# Patient Record
Sex: FEMALE | Race: WHITE | NOT HISPANIC OR LATINO | Employment: OTHER | URBAN - METROPOLITAN AREA
[De-identification: names, ages, dates, MRNs, and addresses within clinical notes are randomized per-mention and may not be internally consistent; named-entity substitution may affect disease eponyms.]

---

## 2017-01-30 ENCOUNTER — ALLSCRIPTS OFFICE VISIT (OUTPATIENT)
Dept: OTHER | Facility: OTHER | Age: 72
End: 2017-01-30

## 2017-03-17 ENCOUNTER — ALLSCRIPTS OFFICE VISIT (OUTPATIENT)
Dept: OTHER | Facility: OTHER | Age: 72
End: 2017-03-17

## 2017-04-07 ENCOUNTER — ALLSCRIPTS OFFICE VISIT (OUTPATIENT)
Dept: OTHER | Facility: OTHER | Age: 72
End: 2017-04-07

## 2017-05-01 ENCOUNTER — HOSPITAL ENCOUNTER (OUTPATIENT)
Dept: RADIOLOGY | Facility: MEDICAL CENTER | Age: 72
Discharge: HOME | End: 2017-05-01
Payer: MEDICARE

## 2017-05-01 DIAGNOSIS — Z12.31 ENCOUNTER FOR SCREENING MAMMOGRAM FOR MALIGNANT NEOPLASM OF BREAST: ICD-10-CM

## 2017-05-01 PROCEDURE — G0202 SCR MAMMO BI INCL CAD: HCPCS

## 2017-06-05 ENCOUNTER — GENERIC CONVERSION - ENCOUNTER (OUTPATIENT)
Dept: OTHER | Facility: OTHER | Age: 72
End: 2017-06-05

## 2017-06-06 ENCOUNTER — ALLSCRIPTS OFFICE VISIT (OUTPATIENT)
Dept: OTHER | Facility: OTHER | Age: 72
End: 2017-06-06

## 2017-07-07 ENCOUNTER — ALLSCRIPTS OFFICE VISIT (OUTPATIENT)
Dept: OTHER | Facility: OTHER | Age: 72
End: 2017-07-07

## 2017-09-14 ENCOUNTER — ALLSCRIPTS OFFICE VISIT (OUTPATIENT)
Dept: OTHER | Facility: OTHER | Age: 72
End: 2017-09-14

## 2017-10-06 ENCOUNTER — GENERIC CONVERSION - ENCOUNTER (OUTPATIENT)
Dept: OTHER | Facility: OTHER | Age: 72
End: 2017-10-06

## 2017-10-18 ENCOUNTER — ALLSCRIPTS OFFICE VISIT (OUTPATIENT)
Dept: OTHER | Facility: OTHER | Age: 72
End: 2017-10-18

## 2017-10-19 NOTE — PROGRESS NOTES
Assessment  1  TMJ syndrome (524 69) (M26 129)    Discussion/Summary    Suspected right TMJ  Symptomatic treatment  Follow up with dentist       Chief Complaint  1  Ear Pain    History of Present Illness  HPI: several week history of intermittent right ear pain  no ear drainage  No nasal congestion  She wears bilateral hearing aids and was recently seen by her audiologist  She was also evaluated by her dentist she has cap on the right lower molar  she may need a root canal  no pain with chewing  Current medications reviewed  Review of Systems    Constitutional: no fever-- and-- no chills  ENT: as noted in HPI,-- no sore throat-- and-- no nasal discharge  Respiratory: no cough  Gastrointestinal: no nausea-- and-- no vomiting  Integumentary: no rashes  Neurological: no headache-- and-- no dizziness  Active Problems  1  Allergic rhinitis (477 9) (J30 9)   2  Atrial fibrillation (427 31) (I48 91)   3  Essential hypertension (401 9) (I10)   4  Fibromyalgia (729 1) (M79 7)   5  Greater trochanteric bursitis of right hip (726 5) (M70 61)   6  Hip bursitis, left (726 5) (M70 72)   7  Hip bursitis, right (726 5) (M70 71)   8  Hyperlipidemia (272 4) (E78 5)   9  Iliotibial band syndrome (728 89) (M76 30)   10  Iliotibial band syndrome of both sides (728 89) (M76 31,M76 32)   11  Insomnia (780 52) (G47 00)   12  Low back pain (724 2) (M54 5)   13  Lumbar spondylosis (721 3) (M47 816)   14  Lumbar stenosis (724 02) (M48 061)   15  Lumbosacral radiculopathy (724 4) (M54 17)   16  Need for prophylactic vaccination and inoculation against influenza (V04 81) (Z23)   17  Osteoarthritis Of Hand (715 94)   18  Osteoarthritis of knee (715 36) (M17 10)   19  Osteopenia (733 90) (M85 80)   20  Paroxysmal atrial fibrillation (427 31) (I48 0)   21  Peripheral neuropathy (356 9) (G62 9)   22  Piriformis syndrome, right (355 0) (G57 01)   23  Restless legs syndrome (333 94) (G25 81)   24   Right lumbar radiculopathy (724 4) (M54 16)   25  Sacroiliac pain (724 6) (M53 3)   26  Sacroiliitis (720 2) (M46 1)   27  Vitamin D deficiency (268 9) (E55 9)    Past Medical History  1  History of Atrial fibrillation with rapid ventricular response (427 31) (I48 91)   2  History of Benign colon polyp (211 3) (K63 5)   3  History of Effusion of knee joint right (719 06) (M25 461)   4  Fibromyalgia (729 1) (M79 7)   5  History of actinic keratosis (V13 3) (Z87 2)   6  History of basal cell carcinoma (V10 83) (Z85 828)   7  History of esophageal reflux (V12 79) (Z87 19)   8  History of nummular eczema (V13 3) (Z87 2)   9  History of Palpitations (785 1) (R00 2)   10  History of Rectal hemorrhage (569 3) (K62 5)   11  History of Right Peroneal Tendonitis (726 79)   12  History of Trochanteric bursitis, unspecified laterality (726 5) (M70 60)    Family History  Father    1  Family history of Coronary Artery Disease (V17 49)   2  Family history of cerebrovascular accident (V17 1) (Z82 3)   3  Family history of myocardial infarction (V17 3) (Z82 49)  Unknown    4  Family history of Back disorder   5  Family history of neuropathy (V17 2) (Z82 0)  Family History    6  Family history of Coronary Artery Disease (V17 49)   7  Family history of Osteoporosis (V17 81)    Social History   · Never A Smoker   · Social alcohol use (Z78 9)    Surgical History  1  History of Cataract Surgery   2  History of Knee Replacement   3  History of Rectal Surgery Polypectomy   4  History of Tonsillectomy   5  History of Total Abdominal Hysterectomy   6  History of Tubal Ligation    Current Meds   1  AmLODIPine Besylate 2 5 MG Oral Tablet; TAKE 1 TABLET DAILY; Therapy: 05VPH1978 to (Evaluate:14Nov2017)  Requested for: 14SEQ6705; Last   Rx:18May2017 Ordered   2  B Complex + C TR TBCR; Therapy: (Recorded:11May2015) to Recorded   3  Black Cohosh 20 MG Oral Tablet; Therapy: (Recorded:11May2015) to Recorded   4  Chromium Picolinate Powder;    Therapy: (Recorded:12Tnf7619) to Recorded   5  [employer] Vitamin D 2000 UNIT CAPS; Therapy: (Recorded:80Vqj0291) to Recorded   6  Eliquis 5 MG Oral Tablet; Take 1 tablet twice daily; Therapy: 39YTQ5875 to (Evaluate:76Qkz3452)  Requested for: 64DAL4291 Recorded   7  Ezetimibe 10 MG Oral Tablet; Take 1 tablet by mouth  daily; Therapy: 92Tlf8066 to (Evaluate:16Rje5742)  Requested for: 31Dbf5996; Last   Rx:02Aug2017 Ordered   8  Gabapentin 300 MG Oral Capsule; TAKE 2 CAPSULES AT NIGHT; Therapy: 21EGU1773 to (Lonia Frizzle)  Requested for: 43QQG0115; Last   Rx:05Jun2017; Status: ACTIVE - Transmit to Pharmacy - Awaiting Verification Ordered   9  Losartan Potassium 100 MG Oral Tablet; take 1 tablet by mouth once daily; Therapy: 79QJV6526 to (Jen Flax)  Requested for: 97DZX3367; Last   Rx:29Mar2017 Ordered   10  Metoprolol Tartrate 100 MG Oral Tablet; TAKE 1 TABLET TWICE DAILY; Therapy: (Recorded:15Xlw1001) to Recorded   11  ROPINIRole HCl - 0 25 MG Oral Tablet; TAKE ONE TABLET BY MOUTH AT BEDTIME; Therapy: 39JSC9887 to (Evaluate:68Grk6338)  Requested for: 84ZXJ4025; Last    Rx:15Jun2017; Status: ACTIVE - Transmit to [facility] Ordered   12  Sotalol HCl - 80 MG Oral Tablet; Take 1 tablet by mouth  twice a day; Therapy: 26KWD5321 to (Evaluate:63Muo6157)  Requested for: 76TIW3791; Last    Rx:06Vrs6759 Ordered   13  TraMADol HCl - 50 MG Oral Tablet; TAKE 1 TABLET TWICE DAILY AS NEEDED; Therapy: 20XDP7006 to (Evaluate:30Jan2018)  Requested for: 46Yoy8311; Last    Rx:78Jzk9511 Ordered   14  Zetia 10 MG Oral Tablet; TAKE 1 TABLET DAILY; Therapy: 30NBT6420 to (Leo Whiting)  Requested for: 16VBK5790; Last    Rx:07Nov2016 Ordered   15  Zolpidem Tartrate 10 MG Oral Tablet; take one tablet by mouth at bedtime as needed; Therapy: 30Apr2013 to (Evaluate:65Bum7573)  Requested for: 14Sep2017; Last    Rx:14Sep2017 Ordered    Allergies  1  ACE Inhibitors   2   Penicillins    Vitals Recorded: 28EBT8679 11:43AM   Temperature 99 1 F   Heart Rate 62   Respiration 16   Systolic 416   Diastolic 72   Height 5 ft 2 in   Weight 157 lb    BMI Calculated 28 72   BSA Calculated 1 72     Physical Exam    Constitutional   General appearance: No acute distress, well appearing and well nourished  Head and Face   Palpation of the face and sinuses: No sinus tenderness  Eyes   Conjunctiva and lids: No swelling, erythema or discharge  Ears, Nose, Mouth, and Throat Mild tenderness right TMJ  Otoscopic examination: Tympanic membranes translucent with normal light reflex  Canals patent without erythema  Oropharynx: Normal with no erythema, edema, exudate or lesions  Neck   Neck: Supple, symmetric, trachea midline, no masses  Thyroid: Normal, no thyromegaly  There were no thyroid nodules  Pulmonary   Auscultation of lungs: Clear to auscultation  Cardiovascular   Auscultation of heart: Normal rate and rhythm, normal S1 and S2, no murmurs  Carotid pulses: 2+ bilaterally  no bruit heard over the right carotid-- and-- no bruit heard over the left carotid  Lymphatic   Palpation of lymph nodes in neck: No lymphadenopathy  no anterior cervical node enlargement,-- no posterior cervical node enlargement,-- no submandibular node enlargement-- and-- no supraclavicular node enlargement  Skin   Skin and subcutaneous tissue: Normal without rashes or lesions  Future Appointments    Date/Time Provider Specialty Site   12/29/2017 09:00 AM TRICIA Russell   [facility]     Signatures   Electronically signed by : TRICIA Sotelo ; Oct 18 2017 12:15PM EST                       (Author)

## 2017-11-22 ENCOUNTER — ALLSCRIPTS OFFICE VISIT (OUTPATIENT)
Dept: OTHER | Facility: OTHER | Age: 72
End: 2017-11-22

## 2017-11-23 NOTE — PROGRESS NOTES
Assessment    1  URI (upper respiratory infection) (465 9) (J06 9)    Plan  URI (upper respiratory infection)    · Doxycycline Hyclate 100 MG Oral Tablet; take 1 tablet by mouth twice a day withfood   · Follow Up if Not Better Evaluation and Treatment  Follow-up  Status: Complete  Done:22Nov2017 12:39PM   · The following home treatments may soothe a sore throat ; Status:Complete;   Done:22Nov2017 12:39PM   · Use a cough medicine to help you get adequate rest ; Status:Complete;   Done:22Nov2017 12:39PM   · Call [phone] if: The symptoms are not better in 7 days ; Status:Complete;   Done:22Nov2017 12:39PM   · Call [phone] if: The symptoms seem worse ; Status:Complete;   Done:22Nov2017 12:39PM    Discussion/Summary    Continue with symptom treatment for cough and nasal congestion  No decongestants  Chief Complaint    1  Cough   2  Nasal Symptoms    History of Present Illness  HPI: 2 week history of cold symptoms which have gradually improved  over the last several days she developed persistent nonproductive cough she has been using Coricidin HBP      Review of Systems   Constitutional: no fever-- and-- no chills  ENT: no earache,-- no sore throat-- and-- no nasal discharge  Cardiovascular: no chest pain  Respiratory: as noted in HPI,-- no shortness of breath-- and-- no wheezing  Gastrointestinal: no nausea,-- no vomiting-- and-- no diarrhea  Musculoskeletal: no myalgias  Neurological: no headache  Active Problems  1  Allergic rhinitis (477 9) (J30 9)   2  Atrial fibrillation (427 31) (I48 91)   3  Essential hypertension (401 9) (I10)   4  Fibromyalgia (729 1) (M79 7)   5  Greater trochanteric bursitis of right hip (726 5) (M70 61)   6  Hip bursitis, left (726 5) (M70 72)   7  Hip bursitis, right (726 5) (M70 71)   8  Hyperlipidemia (272 4) (E78 5)   9  Iliotibial band syndrome (728 89) (M76 30)   10  Iliotibial band syndrome of both sides (728 89) (M76 31,M76 32)   11   Insomnia (780 52) (G47 00)   12  Low back pain (724 2) (M54 5)   13  Lumbar spondylosis (721 3) (M47 816)   14  Lumbar stenosis (724 02) (M48 061)   15  Lumbosacral radiculopathy (724 4) (M54 17)   16  Need for prophylactic vaccination and inoculation against influenza (V04 81) (Z23)   17  Osteoarthritis Of Hand (715 94)   18  Osteoarthritis of knee (715 36) (M17 10)   19  Osteopenia (733 90) (M85 80)   20  Paroxysmal atrial fibrillation (427 31) (I48 0)   21  Peripheral neuropathy (356 9) (G62 9)   22  Piriformis syndrome, right (355 0) (G57 01)   23  Restless legs syndrome (333 94) (G25 81)   24  Right lumbar radiculopathy (724 4) (M54 16)   25  Sacroiliac pain (724 6) (M53 3)   26  Sacroiliitis (720 2) (M46 1)   27  TMJ syndrome (524 69) (M26 629)   28  Vitamin D deficiency (268 9) (E55 9)    Past Medical History  1  History of Atrial fibrillation with rapid ventricular response (427 31) (I48 91)   2  History of Benign colon polyp (211 3) (K63 5)   3  History of Effusion of knee joint right (719 06) (M25 461)   4  Fibromyalgia (729 1) (M79 7)   5  History of actinic keratosis (V13 3) (Z87 2)   6  History of basal cell carcinoma (V10 83) (Z85 828)   7  History of esophageal reflux (V12 79) (Z87 19)   8  History of nummular eczema (V13 3) (Z87 2)   9  History of Palpitations (785 1) (R00 2)   10  History of Rectal hemorrhage (569 3) (K62 5)   11  History of Right Peroneal Tendonitis (726 79)   12  History of Trochanteric bursitis, unspecified laterality (726 5) (M70 60)    Family History  Father    1  Family history of Coronary Artery Disease (V17 49)   2  Family history of cerebrovascular accident (V17 1) (Z82 3)   3  Family history of myocardial infarction (V17 3) (Z82 49)  Unknown    4  Family history of Back disorder   5  Family history of neuropathy (V17 2) (Z82 0)  Family History    6  Family history of Coronary Artery Disease (V17 49)   7   Family history of Osteoporosis (V17 81)    Social History   · Never A Smoker   · Social alcohol use (Z78 9)    Surgical History    1  History of Cataract Surgery   2  History of Knee Replacement   3  History of Rectal Surgery Polypectomy   4  History of Tonsillectomy   5  History of Total Abdominal Hysterectomy   6  History of Tubal Ligation    Current Meds   1  AmLODIPine Besylate 2 5 MG Oral Tablet; take one tablet by mouth one time daily; Therapy: 34OYP3194 to (Evaluate:12Apr2018)  Requested for: 30DMB3339; Last Rx:13Nov2017 Ordered   2  B Complex + C TR TBCR; Therapy: (Recorded:58Bdt7339) to Recorded   3  Black Cohosh 20 MG Oral Tablet; Therapy: (Recorded:11May2015) to Recorded   4  Chromium Picolinate Powder; Therapy: (Recorded:11May2015) to Recorded   5  CVS Vitamin D 2000 UNIT CAPS; Therapy: (Recorded:11May2015) to Recorded   6  Eliquis 5 MG Oral Tablet; Take 1 tablet twice daily; Therapy: 53XNG2106 to ()  Requested for: 28Oct2017; Last BT:46RSH4540 Ordered   7  Ezetimibe 10 MG Oral Tablet; Take 1 tablet by mouth  daily; Therapy: 46Ufj6436 to (Evaluate:48Hel2414)  Requested for: 18Frw9742; Last Rx:90Owf4660 Ordered   8  Gabapentin 300 MG Oral Capsule; TAKE 2 CAPSULES AT NIGHT; Therapy: 30QOJ9729 to (Jony Willis)  Requested for: 26OAL1077; Last Rx:05Jun2017; Status: ACTIVE - Transmit to Pharmacy - Awaiting Verification Ordered   9  Losartan Potassium 100 MG Oral Tablet; take 1 tablet by mouth once daily; Therapy: 99QZC7236 to ([id number])  Requested for: 75HSL6765; Last Rx:29Mar2017 Ordered   10  Metoprolol Tartrate 100 MG Oral Tablet; TAKE 1 TABLET TWICE DAILY; Therapy: (Recorded:20Ghs6892) to Recorded   11  ROPINIRole HCl - 0 25 MG Oral Tablet; TAKE ONE TABLET BY MOUTH AT BEDTIME; Therapy: 92WTU1548 to (Evaluate:99Djv1724)  Requested for: 99RTW8987; Last  Rx:12Nov2017; Status: ACTIVE - Transmit to [facility] Verification Ordered   12  Sotalol HCl - 80 MG Oral Tablet; Take 1 tablet by mouth  twice a day;   Therapy: 92ECO7287 to (Evaluate:85Imk1201) Requested for: 20Nov2017; Last  Rx:19Nov2017 Ordered   13  TraMADol HCl - 50 MG Oral Tablet; TAKE 1 TABLET TWICE DAILY AS NEEDED; Therapy: 97ZUL3197 to (Evaluate:30Jan2018)  Requested for: 32Xgp3092; Last  Rx:51Mgm9185 Ordered   14  Zetia 10 MG Oral Tablet; TAKE 1 TABLET DAILY; Therapy: 30CBJ5219 to (Raleigh Curiel)  Requested for: 11XIE2262; Last  Rx:94Pkh1850 Ordered   15  Zolpidem Tartrate 10 MG Oral Tablet; take one tablet by mouth at bedtime as needed; Therapy: 87Xic7474 to (Evaluate:15Ycd3373)  Requested for: 38Wcc1910; Last  Rx:04Dub9541 Ordered    Allergies  1  ACE Inhibitors   2  Penicillins    Vitals   Recorded: 22Nov2017 11:27AM   Temperature 98 4 F   Heart Rate 60   Respiration 16   Systolic 559   Diastolic 80   BP CUFF SIZE Large   Height 5 ft 2 in   Weight 152 lb 9 6 oz   BMI Calculated 27 91   BSA Calculated 1 7       Physical Exam   Constitutional  General appearance: No acute distress, well appearing and well nourished  Head and Face  Palpation of the face and sinuses: No sinus tenderness  Eyes  Conjunctiva and lids: No swelling, erythema or discharge  Ears, Nose, Mouth, and Throat  Otoscopic examination: Tympanic membranes translucent with normal light reflex  Canals patent without erythema  Oropharynx: Normal with no erythema, edema, exudate or lesions  Neck  Neck: Supple, symmetric, trachea midline, no masses  Thyroid: Normal, no thyromegaly  Pulmonary  Respiratory effort: No increased work of breathing or signs of respiratory distress  Auscultation of lungs: Clear to auscultation  Cardiovascular  Auscultation of heart: Normal rate and rhythm, normal S1 and S2, no murmurs  Lymphatic  Palpation of lymph nodes in neck: No lymphadenopathy  Future Appointments    Date/Time Provider Specialty Site   01/12/2018 03:45 PM TRICIA Guillen   [facility]       Signatures   Electronically signed by : TRICIA Alva ; Nov 22 2017 12: 39PM EST                       (Author)

## 2017-12-06 ENCOUNTER — GENERIC CONVERSION - ENCOUNTER (OUTPATIENT)
Dept: OTHER | Facility: OTHER | Age: 72
End: 2017-12-06

## 2017-12-08 ENCOUNTER — GENERIC CONVERSION - ENCOUNTER (OUTPATIENT)
Dept: OTHER | Facility: OTHER | Age: 72
End: 2017-12-08

## 2017-12-27 ENCOUNTER — GENERIC CONVERSION - ENCOUNTER (OUTPATIENT)
Dept: OTHER | Facility: OTHER | Age: 72
End: 2017-12-27

## 2017-12-29 ENCOUNTER — ALLSCRIPTS OFFICE VISIT (OUTPATIENT)
Dept: OTHER | Facility: OTHER | Age: 72
End: 2017-12-29

## 2017-12-29 ENCOUNTER — GENERIC CONVERSION - ENCOUNTER (OUTPATIENT)
Dept: OTHER | Facility: OTHER | Age: 72
End: 2017-12-29

## 2017-12-29 ENCOUNTER — APPOINTMENT (OUTPATIENT)
Dept: RADIOLOGY | Facility: MEDICAL CENTER | Age: 72
End: 2017-12-29
Payer: MEDICARE

## 2017-12-29 DIAGNOSIS — M17.10 OSTEOARTHRITIS OF KNEE, UNILATERAL: ICD-10-CM

## 2017-12-29 DIAGNOSIS — Z12.11 ENCOUNTER FOR SCREENING FOR MALIGNANT NEOPLASM OF COLON: ICD-10-CM

## 2017-12-29 DIAGNOSIS — M17.10 PRIMARY OSTEOARTHRITIS OF ONE KNEE: ICD-10-CM

## 2017-12-29 PROCEDURE — 73560 X-RAY EXAM OF KNEE 1 OR 2: CPT

## 2018-01-03 NOTE — PROGRESS NOTES
Assessment   1  Osteoarthritis of knee (715 36) (M17 10)    Plan   Osteoarthritis of knee    · Betamethasone Sod Phos & Acet 6 (3-3) MG/ML Injection Suspension    (Celestone Soluspan)   · XR KNEE 1 OR 2 VIEW RIGHT; Status:Active - Retrospective By Protocol Authorization; Requested for:58Spw6050;    · XR KNEE 1 OR 2 VIEW RIGHT; Status:Canceled - Retrospective By Protocol    Authorization; Discussion/Summary      She had her right knee injected successfully at today's visit   we will see her in 3 additional months in follow-up   she voiced no further considerations prior to exiting the office  it was my privilege to assist her in care at her expressed request           Chief Complaint   1  Knee Pain  History of arthritic right knee and pain      History of Present Illness   HPI: adult female who gains significant benefit by periodic injections to her right knee  her right knee pain has returned over the past 2 weeks   she is not ready nor desires to pursue total knee replacement surgery   she is intent on possibly receiving an injection at today's visit      Review of Systems      ROS reviewed  Active Problems   1  Allergic rhinitis (477 9) (J30 9)   2  Atrial fibrillation (427 31) (I48 91)   3  Essential hypertension (401 9) (I10)   4  Fibromyalgia (729 1) (M79 7)   5  Greater trochanteric bursitis of right hip (726 5) (M70 61)   6  Hip bursitis, left (726 5) (M70 72)   7  Hip bursitis, right (726 5) (M70 71)   8  Hyperlipidemia (272 4) (E78 5)   9  Iliotibial band syndrome (728 89) (M76 30)   10  Iliotibial band syndrome of both sides (728 89) (M76 31,M76 32)   11  Insomnia (780 52) (G47 00)   12  Low back pain (724 2) (M54 5)   13  Lumbar spondylosis (721 3) (M47 816)   14  Lumbar stenosis (724 02) (M48 061)   15  Lumbosacral radiculopathy (724 4) (M54 17)   16  Need for prophylactic vaccination and inoculation against influenza (V04 81) (Z23)   17  Osteoarthritis Of Hand (715 94)   18  Osteoarthritis of knee (715 36) (M17 10)   19  Osteopenia (733 90) (M85 80)   20  Paroxysmal atrial fibrillation (427 31) (I48 0)   21  Peripheral neuropathy (356 9) (G62 9)   22  Piriformis syndrome, right (355 0) (G57 01)   23  Restless legs syndrome (333 94) (G25 81)   24  Right lumbar radiculopathy (724 4) (M54 16)   25  Sacroiliac pain (724 6) (M53 3)   26  Sacroiliitis (720 2) (M46 1)   27  TMJ syndrome (524 69) (M26 629)   28  URI (upper respiratory infection) (465 9) (J06 9)   29  Vitamin D deficiency (268 9) (E55 9)    Past Medical History    · History of Atrial fibrillation with rapid ventricular response (427 31) (I48 91)   · History of Benign colon polyp (211 3) (K63 5)   · History of Effusion of knee joint right (719 06) (M25 461)   · Fibromyalgia (729 1) (M79 7)   · History of actinic keratosis (V13 3) (Z87 2)   · History of basal cell carcinoma (V10 83) (C57 863)   · History of esophageal reflux (V12 79) (Z87 19)   · History of nummular eczema (V13 3) (Z87 2)   · History of Palpitations (785 1) (R00 2)   · History of Rectal hemorrhage (569 3) (K62 5)   · History of Right Peroneal Tendonitis (726 79)   · History of Trochanteric bursitis, unspecified laterality (726 5) (M70 60)     The active problems and past medical history were reviewed and updated today  Surgical History    · History of Cataract Surgery   · History of Knee Replacement   · History of Rectal Surgery Polypectomy   · History of Tonsillectomy   · History of Total Abdominal Hysterectomy   · History of Tubal Ligation     The surgical history was reviewed and updated today         Family History   Father    · Family history of Coronary Artery Disease (V17 49)   · Family history of cerebrovascular accident (V17 1) (Z82 3)   · Family history of myocardial infarction (V17 3) (Z82 49)  Unknown    · Family history of Back disorder   · Family history of neuropathy (V17 2) (Z82 0)  Family History    · Family history of Coronary Artery Disease (V17 49)   · Family history of Osteoporosis (V17 81)     The family history was reviewed and updated today  Social History    · Never A Smoker   · Social alcohol use (Z78 9)  The social history was reviewed and updated today  Current Meds    1  AmLODIPine Besylate 2 5 MG Oral Tablet; take one tablet by mouth one time daily; Therapy: 95EKG6630 to (Evaluate:12Apr2018)  Requested for: 83DHF8868; Last     Rx:13Nov2017 Ordered   2  B Complex + C TR TBCR; Therapy: (Recorded:17Lbg5526) to Recorded   3  Chromium Picolinate Powder; Therapy: (Recorded:11May2015) to Recorded   4  CVS Vitamin D 2000 UNIT CAPS; Therapy: (Recorded:11May2015) to Recorded   5  Eliquis 5 MG Oral Tablet; Take 1 tablet twice daily; Therapy: 21ZDT7706 to ()  Requested for: 28Oct2017; Last     MT:75VJY2522 Ordered   6  Ezetimibe 10 MG Oral Tablet; Take 1 tablet by mouth  daily; Therapy: 35Twx8287 to (Evaluate:65Snr6656)  Requested for: 79Att5741; Last     Rx:13Syo9401 Ordered   7  Gabapentin 300 MG Oral Capsule; TAKE 2 CAPSULES AT NIGHT; Therapy: 87SAO2993 to [phone])  Requested for: 84Knj5965; Last     Rx:85Ojb6473 Ordered   8  Losartan Potassium 100 MG Oral Tablet; take 1 tablet by mouth once daily; Therapy: 29CYV7945 to (Evaluate:77Uls7368)  Requested for: 71Xym0447; Last     Rx:17Snh2773 Ordered   9  Metoprolol Tartrate 100 MG Oral Tablet; TAKE 1 TABLET TWICE DAILY; Therapy: (Recorded:21Wuj6912) to Recorded   10  ROPINIRole HCl - 0 25 MG Oral Tablet; TAKE ONE TABLET BY MOUTH AT BEDTIME; Therapy: 62BRW9196 to (Evaluate:14Flp0934)  Requested for: 11IFH1198; Last      Rx:12Nov2017; Status: ACTIVE - [facility] to [facility] Ordered   11  Sotalol HCl - 80 MG Oral Tablet; Take 1 tablet by mouth  twice a day; Therapy: 75AMC6657 to (5224 5883)  Requested for: 59LPZ0353; Last      Rx:29Knr1865 Ordered   12   TraMADol HCl - 50 MG Oral Tablet; TAKE 1 TABLET TWICE DAILY AS NEEDED; Therapy: 49UEV0200 to (Evaluate:30Jan2018)  Requested for: 77Aaj8149; Last      Rx:65Sqs6746 Ordered   13  Zetia 10 MG Oral Tablet; TAKE 1 TABLET DAILY; Therapy: 07DAN7734 to (Alexandra Talavera)  Requested for: 55UVS4525; Last      Rx:88Dhi5717 Ordered   14  Zolpidem Tartrate 10 MG Oral Tablet; TAKE ONE TABLET BY MOUTH NIGHTLY AT      BEDTIME AS NEEDED; Therapy: 30Apr2013 to (Evaluate:22Mar2018)  Requested for: 22Kzc4494; Last      Rx:71Qbp5759 Ordered     The medication list was reviewed and updated today  Allergies   1  ACE Inhibitors   2  Penicillins    Vitals   Signs   Heart Rate: 60  Systolic: 184  Diastolic: 64  Height: 5 ft 2 in  Weight: 154 lb   BMI Calculated: 28 17  BSA Calculated: 1 71    Physical Exam    her right knee has no effusion no discoloration      her right knee has significant medial compartment pain with palpation the lateral compartment with pain as well but of a lesser severity      the patellofemoral joint has grating which is palpable through the range of motion arc         Results/Data   I personally reviewed the films/images/results in the office today  My interpretation follows  X-ray Review new right knee x-rays were taken in two views which show medial compartment narrowing increased subchondral changes medial proximal tibia and incongruity of the patellofemoral joint best seen on the Merchant view  Procedure      Procedure: Injection of the right knee joint  Indication:  Joint pain-- and-- Osteoarthritis  Were discussed with the patient  Alcohol was used to prep the area  ethyl chloride spray was used as a topical anesthetic  Using sterile technique, the aspiration/injection needle was then directed from a Anteromedial aspect  A 25-gauge was used to inject 3 mL of 1% Lidocaine-- and-- 2 mL of 3mg/mL betamethasone  A bandage was applied  Complications: none        Follow-up in the office in 3 month(s)  Attending Note   Collaborating Physician Note: Collaborating Note: I agree with the Advanced Practitioner note  I discussed the case with the Advanced Practitioner and reviewed the AP note      Future Appointments      Date/Time Provider Specialty Site   03/09/2018 10:15 AM TRICIA Lira   [facility]    Electronically signed by : Fabian Urbano, [facility]; Dec 29 2017 10:24AM EST                       (Author)     Electronically signed by : TRICIA Ontiveros ; Jan 2 2018  8:03AM EST                       (Author)

## 2018-01-09 NOTE — PROGRESS NOTES
Assessment    1  Piriformis syndrome, right (355 0) (G57 01)   2  Right lumbar radiculopathy (724 4) (M54 16)    Discussion/Summary    Follow up visit for acupuncture in 3 to 4 weeks  daily stretching  History of Present Illness  follow up visit for acupuncture  she has had 2 treatments to date  overall she reports a decrease in her pain level  decreased back pain and diminished pain radiating into right leg  no right leg weakness or paresthesias  no bowel or bladder changes  previous pain management evaluation  improvement with injection for right piriformis syndrome and right SI injection for sacroiliitis  prior right transforaminal steroid injection for right lumbar radiculopathy  medications reviewed  on Gabapentin 300 mg three times a day and prn Tramadol generally 2/day  Active Problems    1  Actinic keratosis (702 0) (L57 0)   2  Acute knee pain, right (719 46) (M25 561)   3  Allergic rhinitis (477 9) (J30 9)   4  Bilateral impacted cerumen (380 4) (H61 23)   5  Cervical adenitis (289 3) (I88 9)   6  Dyspepsia (536 8) (K30)   7  Effusion of knee joint right (719 06) (M25 461)   8  Encounter for screening colonoscopy (V76 51) (Z12 11)   9  Essential hypertension (401 9) (I10)   10  Fibromyalgia (729 1) (M79 7)   11  Hyperlipidemia (272 4) (E78 5)   12  Hypertension (401 9) (I10)   13  Iliotibial band syndrome (728 89) (M76 30)   14  Iliotibial band syndrome of both sides (728 89) (M76 31,M76 32)   15  Insomnia (780 52) (G47 00)   16  Low back pain (724 2) (M54 5)   17  Lumbar spondylosis (721 3) (M47 816)   18  Lumbar stenosis (724 02) (M48 06)   19  Lumbosacral radiculopathy (724 4) (M54 17)   20  Need for prophylactic vaccination and inoculation against influenza (V04 81) (Z23)   21  Osteoarthritis Of Hand (715 94)   22  Osteoarthritis of knee (715 36) (M17 9)   23  Osteopenia (733 90) (M85 80)   24  Peripheral neuropathy (356 9) (G62 9)   25  Piriformis syndrome, right (355 0) (G57 01)   26  Restless legs syndrome (333 94) (G25 81)   27  Right lumbar radiculopathy (724 4) (M54 16)   28  Sacroiliac pain (724 6) (M53 3)   29  Sacroiliitis (720 2) (M46 1)   30  Vitamin D deficiency (268 9) (E55 9)    Past Medical History    1  History of Benign colon polyp (211 3) (K63 5)   2  Fibromyalgia (729 1) (M79 7)   3  History of basal cell carcinoma (V10 83) (Z85 828)   4  History of esophageal reflux (V12 79) (Z87 19)   5  History of hypercholesterolemia (V12 29) (Z86 39)   6  History of nummular eczema (V13 3) (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 2)   7  History of Palpitations (785 1) (R00 2)   8  History of Rectal hemorrhage (569 3) (K62 5)   9  History of Right Peroneal Tendonitis (726 79)   10  History of Trochanteric bursitis, unspecified laterality (726 5) (M70 60)    Surgical History    1  History of Cataract Surgery   2  History of Knee Replacement   3  History of Rectal Surgery Polypectomy   4  History of Tonsillectomy   5  History of Total Abdominal Hysterectomy   6  History of Tubal Ligation    Family History    1  Family history of Coronary Artery Disease (V17 49)   2  Family history of cerebrovascular accident (V17 1) (Z82 3)   3  Family history of myocardial infarction (V17 3) (Z82 49)    4  Family history of Back disorder   5  Family history of neuropathy (V17 2) (Z82 0)    6  Family history of Coronary Artery Disease (V17 49)   7  Family history of Osteoporosis (V17 81)    Social History    · Never A Smoker   · Social alcohol use (Z78 9)    Current Meds   1  B Complex + C TR TBCR; Therapy: (Recorded:11May2015) to Recorded   2  Black Cohosh 20 MG Oral Tablet; Therapy: (Recorded:11May2015) to Recorded   3  Chromium Picolinate Powder; Therapy: (Recorded:11May2015) to Recorded   4  [employer]S Vitamin D 2000 UNIT CAPS; Therapy: (Recorded:11May2015) to Recorded   5  Evening Primrose OIL; Therapy: (Recorded:11May2015) to Recorded   6  Gabapentin 300 MG Oral Capsule; TAKE ONE CAPSULE BY MOUTH THREE TIMES   DAILY;    Therapy: 49NPT0013 to (Lajuan Late)  Requested for: 45UUE6177; Last   Rx:07Mar2016 Ordered   7  Losartan Potassium 100 MG Oral Tablet; take 1 tablet by mouth once daily; Therapy: 92HCW0399 to (Evaluate:11Riz2328)  Requested for: 37POI0727; Last   Rx:05Jan2016 Ordered   8  Propranolol HCl ER 80 MG Oral Capsule Extended Release 24 Hour; Take 1 capsule by   mouth  daily; Therapy: 39DCB1475 to (Evaluate:29Dck0193)  Requested for: 74UFA4484; Last   Rx:05Jan2016 Ordered   9  ROPINIRole HCl - 0 25 MG Oral Tablet; TAKE ONE TABLET BY MOUTH AT BEDTIME; Therapy: 36LRU0162 to (Evaluate:73Wjd7226)  Requested for: 78GKS8845; Last   Rx:05Jan2016 Ordered   10  TraMADol HCl - 50 MG Oral Tablet; TAKE 1 TABLET TWICE DAILY AS NEEDED; Therapy: 50OSK2933 to (Evaluate:76Xkk1933)  Requested for: 19OHK8417; Last    Rx:08Jan2016 Ordered   11  Zetia 10 MG Oral Tablet; TAKE 1 TABLET DAILY; Therapy: 11XTC3648 to (77 873 135)  Requested for: 44VDK9845; Last    Rx:31Aej0465 Ordered   12  Zolpidem Tartrate 10 MG Oral Tablet; TAKE ONE TABLET BY MOUTH AT BEDTIME AS    NEEDED FOR SLEEP; Therapy: 30Apr2013 to (Evaluate:30Jun2016); Last Rx:33Uji8167 Ordered    Allergies    1  ACE Inhibitors   2  Penicillins    Procedure    Procedure: Acupuncture   Indication: right piriformis syndrome  right lumbar radiculopathy  Procedure Note:     LUMBAR PENS    L2 L4  S2 S4 @ 4 HZ X 20 MINUTES    PSIS GB 30 @ 100 HZ X 20 MINUTES  HEAT LAMP  Future Appointments    Date/Time Provider Specialty Site   04/15/2016 09:45 AM TRICIA Jauregui  Orthopedic Surgery [facility]   05/05/2016 04:00 PM TRICIA Saavedra   Family Medicine [address] [facility],[address]     Signatures   Electronically signed by : TRICIA Barba ; Apr 11 2016  5:16PM EST                       (Author)

## 2018-01-09 NOTE — PROGRESS NOTES
Chief Complaint  Pt here for a BP and EKG check per Dr Taylor Tafoya  Pt c/o palpitations and fatigue  Pt states that she is in the middle of a very emotional move being that her and her  are moving out of their house that they have been in for 50 years together  Dr Marylen Ano reviewed EKg today and her heart rate was 53  Active Problems    1  Actinic keratosis (702 0) (L57 0)   2  Acute URI (465 9) (J06 9)   3  Allergic rhinitis (477 9) (J30 9)   4  Atrial fibrillation (427 31) (I48 91)   5  Cervical adenitis (289 3) (I88 9)   6  Chest pain (786 50) (R07 9)   7  Dyspepsia (536 8) (K30)   8  Essential hypertension (401 9) (I10)   9  Fibromyalgia (729 1) (M79 7)   10  Greater trochanteric bursitis of right hip (726 5) (M70 61)   11  Hip bursitis, left (726 5) (M70 72)   12  Hip bursitis, right (726 5) (M70 71)   13  Hospital discharge follow-up (V67 59) (Z09)   14  Hyperlipidemia (272 4) (E78 5)   15  Iliotibial band syndrome (728 89) (M76 30)   16  Iliotibial band syndrome of both sides (728 89) (M76 31,M76 32)   17  Insomnia (780 52) (G47 00)   18  Low back pain (724 2) (M54 5)   19  Lumbar spondylosis (721 3) (M47 816)   20  Lumbar stenosis (724 02) (M48 06)   21  Lumbosacral radiculopathy (724 4) (M54 17)   22  Need for prophylactic vaccination and inoculation against influenza (V04 81) (Z23)   23  Osteoarthritis Of Hand (715 94)   24  Osteoarthritis of knee (715 36) (M17 10)   25  Osteopenia (733 90) (M85 80)   26  Paroxysmal atrial fibrillation (427 31) (I48 0)   27  Peripheral neuropathy (356 9) (G62 9)   28  Piriformis syndrome, right (355 0) (G57 01)   29  Restless legs syndrome (333 94) (G25 81)   30  Right knee pain (719 46) (M25 561)   31  Right lumbar radiculopathy (724 4) (M54 16)   32  Sacroiliac pain (724 6) (M53 3)   33  Sacroiliitis (720 2) (M46 1)   34  Screening for genitourinary condition (V81 6) (Z13 89)   35  Screening for neurological condition (V80 09) (Z13 89)   36   Vitamin D deficiency (268 9) (E55 9)    Current Meds   1  AmLODIPine Besylate 2 5 MG Oral Tablet; TAKE 1 TABLET DAILY; Therapy: 85KCU6432 to (Evaluate:14Nov2017)  Requested for: 14MFC4966; Last   Rx:18May2017 Ordered   2  B Complex + C TR TBCR; Therapy: (Recorded:11May2015) to Recorded   3  Black Cohosh 20 MG Oral Tablet; Therapy: (Recorded:11May2015) to Recorded   4  Chromium Picolinate Powder; Therapy: (Recorded:11May2015) to Recorded   5  CVS Vitamin D 2000 UNIT CAPS; Therapy: (Recorded:11May2015) to Recorded   6  Eliquis 5 MG Oral Tablet; Take 1 tablet twice daily; Therapy: 94NWW4700 to (Evaluate:21Jul2017)  Requested for: 63Ilh8885; Last   Rx:37Evs2712 Ordered   7  Gabapentin 300 MG Oral Capsule; TAKE 2 CAPSULES AT NIGHT; Therapy: 84EVV1608 to (Zabrina Nickerson)  Requested for: 84KXJ7927; Last   Rx:05Jun2017; Status: ACTIVE - Transmit to Pharmacy - Awaiting Verification Ordered   8  Losartan Potassium 100 MG Oral Tablet; take 1 tablet by mouth once daily; Therapy: 66ZPR2105 to ([facility])  Requested for: 28HWN7647; Last   Rx:29Mar2017 Ordered   9  ROPINIRole HCl - 0 25 MG Oral Tablet; TAKE ONE TABLET BY MOUTH AT BEDTIME; Therapy: 10NVJ6814 to (Evaluate:16Jun2017)  Requested for: 91DSC1185; Last   Rx:96Nck6110; Status: ACTIVE - Transmit to [facility] Ordered   10  Sotalol HCl - 80 MG Oral Tablet; Take 1 tablet by mouth  twice a day; Therapy: 15MJJ6623 to (Evaluate:09Aug2017)  Requested for: 01ABC7520; Last    Rx:11May2017 Ordered   11  TraMADol HCl - 50 MG Oral Tablet; TAKE 1 TABLET TWICE DAILY AS NEEDED; Therapy: 56SSL0964 to (Evaluate:08Nov2017)  Requested for: 27GYQ1825; Last    Rx:12May2017 Ordered   12  Zetia 10 MG Oral Tablet; TAKE 1 TABLET DAILY; Therapy: 79NDA4662 to (Dez Matute)  Requested for: 09CTH8349; Last    Rx:07Nov2016 Ordered   13  Zolpidem Tartrate 10 MG Oral Tablet; TAKE ONE TABLET BY MOUTH NIGHTLY AT    BEDTIME AS NEEDED;     Therapy: 30Apr2013 to (Evaluate:15Jun2017)  Requested for: 80NJO5630; Last    Rx:17Mar2017 Ordered    Allergies    1  ACE Inhibitors   2  Penicillins    Vitals  Signs    Heart Rate: 53  Systolic: 375, RUE, Sitting  Diastolic: 72, RUE, Sitting  Height: 5 ft 2 in  Weight: 159 lb 8 oz  BMI Calculated: 29 17  BSA Calculated: 1 74    Plan  Atrial fibrillation    · EKG/ECG- POC; Status:Complete;   Done: 02STN8489    Future Appointments    Date/Time Provider Specialty Site   07/07/2017 09:00 AM TRICIA Harvey   Orthopedic [address] [place] [address] Dr     Signatures   Electronically signed by : Domenico Cameron, ; Jun 6 2017  1:08PM EST                       (Author)    Electronically signed by : Heaven Robert DO; Jun 9 2017  1:24PM EST                       (Author)

## 2018-01-10 NOTE — MISCELLANEOUS
Message   Recorded as Task   Date: 06/05/2017 09:06 AM, Created By: Perla Elkins   Task Name: Med Renewal Request   Assigned To: SPA maricel clinical,Team   Regarding Patient: Mahin Rizzo, Status: Active   Cecelia Palmer - 05 Jun 2017 9:06 AM     TASK CREATED  [facility] Call Center- Patient called today to request a refill of her Gabapentin 300 mg  Patient would like script sent to her pharmacy [facility] [phone]) any questions please call patient [phone] (cell)   Raisa Grey - 05 Jun 2017 9:42 AM     TASK EDITED  Gabapentin 300mg 2 capsules at HS, #60 with 5 RF last prescribed 12/1/16 by Santana Rapp  No pending sovs   Please advise  Elena Cross - 05 Jun 2017 10:16 AM     TASK REPLIED TO: Previously Assigned To Elena Cross  sent refill  She will either need ov for additional refills or have PCP take over prescribing  [address] Jun 2017 10:42 AM     TASK EDITED  S/W pt and advised Santana Rapp sent order for the gabapentin to her pharmacy with 5 RF on it, she understands she would need an ov before running out again for further RF or she'll need to have her PCP take over prescribing it  Active Problems    1  Actinic keratosis (702 0) (L57 0)   2  Acute URI (465 9) (J06 9)   3  Allergic rhinitis (477 9) (J30 9)   4  Atrial fibrillation (427 31) (I48 91)   5  Cervical adenitis (289 3) (I88 9)   6  Chest pain (786 50) (R07 9)   7  Dyspepsia (536 8) (K30)   8  Essential hypertension (401 9) (I10)   9  Fibromyalgia (729 1) (M79 7)   10  Greater trochanteric bursitis of right hip (726 5) (M70 61)   11  Hip bursitis, left (726 5) (M70 72)   12  Hip bursitis, right (726 5) (M70 71)   13  Hospital discharge follow-up (V67 59) (Z09)   14  Hyperlipidemia (272 4) (E78 5)   15  Iliotibial band syndrome (728 89) (M76 30)   16  Iliotibial band syndrome of both sides (728 89) (M76 31,M76 32)   17  Insomnia (780 52) (G47 00)   18  Low back pain (724 2) (M54 5)   19   Lumbar spondylosis (721 3) (M47 816)   20  Lumbar stenosis (724 02) (M48 06)   21  Lumbosacral radiculopathy (724 4) (M54 17)   22  Need for prophylactic vaccination and inoculation against influenza (V04 81) (Z23)   23  Osteoarthritis Of Hand (715 94)   24  Osteoarthritis of knee (715 36) (M17 10)   25  Osteopenia (733 90) (M85 80)   26  Paroxysmal atrial fibrillation (427 31) (I48 0)   27  Peripheral neuropathy (356 9) (G62 9)   28  Piriformis syndrome, right (355 0) (G57 01)   29  Restless legs syndrome (333 94) (G25 81)   30  Right knee pain (719 46) (M25 561)   31  Right lumbar radiculopathy (724 4) (M54 16)   32  Sacroiliac pain (724 6) (M53 3)   33  Sacroiliitis (720 2) (M46 1)   34  Screening for genitourinary condition (V81 6) (Z13 89)   35  Screening for neurological condition (V80 09) (Z13 89)   36  Vitamin D deficiency (268 9) (E55 9)    Current Meds   1  AmLODIPine Besylate 2 5 MG Oral Tablet; TAKE 1 TABLET DAILY; Therapy: 33PWM9121 to (Evaluate:14Nov2017)  Requested for: 60DYZ0864; Last   Rx:18May2017 Ordered   2  B Complex + C TR TBCR; Therapy: (Recorded:80Ddu0571) to Recorded   3  Black Cohosh 20 MG Oral Tablet; Therapy: (Recorded:82Gpy9579) to Recorded   4  Chromium Picolinate Powder; Therapy: (Recorded:34Jao1737) to Recorded   5  CVS Vitamin D 2000 UNIT CAPS; Therapy: (Recorded:39Dkd8895) to Recorded   6  Eliquis 5 MG Oral Tablet; Take 1 tablet twice daily; Therapy: 42FKK3630 to (Evaluate:70Hzf7002)  Requested for: 80Kuj0258; Last   Rx:31Zkw9552 Ordered   7  Evening Primrose OIL; Therapy: (Recorded:13Gbz9095) to Recorded   8  Gabapentin 300 MG Oral Capsule; TAKE 2 CAPSULES AT NIGHT; Therapy: 55VIO5052 to (Benna Orchard)  Requested for: 01VXN7034; Last   Rx:05Jun2017; Status: ACTIVE - Transmit to Pharmacy - Awaiting Verification Ordered   9  Losartan Potassium 100 MG Oral Tablet; take 1 tablet by mouth once daily;    Therapy: 55HKB5503 to [phone])  Requested for: 62HSF9953; Last   Rx:29Mar2017 Ordered   10  ROPINIRole HCl - 0 25 MG Oral Tablet; TAKE ONE TABLET BY MOUTH AT BEDTIME; Therapy: 10PIM9082 to (Evaluate:16Jun2017)  Requested for: 47YHU7661; Last    Rx:52Iwp8930; Status: ACTIVE - Transmit to [facility] Verification Ordered   11  Sotalol HCl - 80 MG Oral Tablet; Take 1 tablet by mouth  twice a day; Therapy: 65MMU0606 to (Evaluate:04Nrl3828)  Requested for: 68TTO9879; Last    Rx:23Mbf0894 Ordered   12  TraMADol HCl - 50 MG Oral Tablet; TAKE 1 TABLET TWICE DAILY AS NEEDED; Therapy: 06NPS4129 to (Rosemarie Bull)  Requested for: 60GQG9548; Last    Rx:80Vso1199 Ordered   13  Zetia 10 MG Oral Tablet (Ezetimibe); TAKE 1 TABLET DAILY; Therapy: 81GRB3603 to (Rosemarie Bull)  Requested for: 62IHF8098; Last    Rx:11Uti0438 Ordered   14  Zolpidem Tartrate 10 MG Oral Tablet; TAKE ONE TABLET BY MOUTH NIGHTLY AT    BEDTIME AS NEEDED; Therapy: 30Apr2013 to (Evaluate:15Jun2017)  Requested for: 48SNC7897; Last    Rx:17Mar2017 Ordered    Allergies    1  ACE Inhibitors   2   Penicillins    Signatures   Electronically signed by : Lang Schirmer, ; Jun 5 2017 10:42AM EST                       (Author)

## 2018-01-11 NOTE — MISCELLANEOUS
Message   Recorded as Task   Date: 08/08/2016 11:21 AM, Created By: Anaya Rodríguez   Task Name: Med Renewal Request   Assigned To: Josué Spangler clinical,Team   Regarding Patient: Nino Puri, Status: Active   Comment:    Anaya Rodríguez - 08 Aug 2016 11:21 AM     TASK CREATED  Caller: Self; Renew Medication; [phone] (Mobile Phone)  Pt left vm at 1036 that she needs to s/w someone about the gabapentin, she had stopped it but she really has to go back on it b/c she has alot of leg pain  She will need a refill b/c she doesn't have too many left  S/W pt who just last wk called and had decided to wean off the gabapentin b/c she said it wasn't working  She did that and found that the leg pain was worse off of it than when taking it so whe wants to go back on the 300mg 2 tabs QHS, she has be sent to her [facility] [address], on file  Told pt I would c/b once RX sent to her pharmacy  Pt has no pending povs    Elena Cross - 08 Aug 2016 12:06 PM     TASK REPLIED TO: Previously Assigned To SPA maricel clinical,Team                      pt can start gabapentin 300 mg at bedtime for 3 days then take 2 tabs at night  will send to pharmacy  can make 3 month ov or can have PCP take over prescribing  Raisa Grey - 08 Aug 2016 1:59 PM     TASK EDITED  Pt advised of the same  pt verbalized understanding that she will need to contact the office if she has any problems with the gabapentin and she is not to stop this medication abruptly  Pt understands we sent a 3 month Rx of gabapentin to her pharmacy and she will need to sched a 3 month ov for further gabapentin RF or ask her PCP to take over prescribing  Active Problems    1  Actinic keratosis (702 0) (L57 0)   2  A-fib (427 31) (I48 91)   3  Allergic rhinitis (477 9) (J30 9)   4  Cervical adenitis (289 3) (I88 9)   5  Chest pain (786 50) (R07 9)   6  Dyspepsia (536 8) (K30)   7  Essential hypertension (401 9) (I10)   8   Fibromyalgia (729 1) (M79 7)   9  Hospital discharge follow-up (V67 59) (Z09)   10  Hyperlipidemia (272 4) (E78 5)   11  Iliotibial band syndrome (728 89) (M76 30)   12  Iliotibial band syndrome of both sides (728 89) (M76 31,M76 32)   13  Insomnia (780 52) (G47 00)   14  Low back pain (724 2) (M54 5)   15  Lumbar spondylosis (721 3) (M47 816)   16  Lumbar stenosis (724 02) (M48 06)   17  Lumbosacral radiculopathy (724 4) (M54 17)   18  Osteoarthritis Of Hand (715 94)   19  Osteoarthritis of knee (715 36) (M17 9)   20  Osteopenia (733 90) (M85 80)   21  Paroxysmal atrial fibrillation (427 31) (I48 0)   22  Peripheral neuropathy (356 9) (G62 9)   23  Piriformis syndrome, right (355 0) (G57 01)   24  Restless legs syndrome (333 94) (G25 81)   25  Right knee pain (719 46) (M25 561)   26  Right lumbar radiculopathy (724 4) (M54 16)   27  Sacroiliac pain (724 6) (M53 3)   28  Sacroiliitis (720 2) (M46 1)   29  Screening for genitourinary condition (V81 6) (Z13 89)   30  Screening for neurological condition (V80 09) (Z13 89)   31  Vitamin D deficiency (268 9) (E55 9)    Current Meds   1  B Complex + C TR TBCR; Therapy: (Recorded:11May2015) to Recorded   2  Black Cohosh 20 MG Oral Tablet; Therapy: (Recorded:11May2015) to Recorded   3  Chromium Picolinate Powder; Therapy: (Recorded:11May2015) to Recorded   4  CVS Vitamin D 2000 UNIT CAPS; Therapy: (Recorded:11May2015) to Recorded   5  Eliquis 5 MG Oral Tablet; Take 1 tablet twice daily; Therapy: 10KOY2880 to (Evaluate:71Nhn7575)  Requested for: 06Hqx1031; Last   Rx:45Cma0198 Ordered   6  Evening Primrose OIL; Therapy: (Recorded:11May2015) to Recorded   7  Gabapentin 300 MG Oral Capsule; TAKE 2 CAPSULES AT NIGHT; Therapy: 69TMX1923 to (Maine Boone)  Requested for: 19Ghi4906; Last   Rx:26Spi0832; Status: ACTIVE - Transmit to Pharmacy - Awaiting Verification Ordered   8  Losartan Potassium 100 MG Oral Tablet; take 1 tablet by mouth once daily;    Therapy: 61JUW6102 to (Evaluate:20Oid4263)  Requested for: 97SIV8468; Last   Rx:05Jan2016 Ordered   9  ROPINIRole HCl - 0 25 MG Oral Tablet; TAKE ONE TABLET BY MOUTH AT BEDTIME; Therapy: 34OLS1675 to (Evaluate:92Knm5566)  Requested for: 60FPP2512; Last   Rx:25Jun2016 Ordered   10  Sotalol HCl - 80 MG Oral Tablet; take 1 tablet by mouth twice daily; Therapy: 52SZK5104 to (Evaluate:31Nng9973)  Requested for: 80Psb8246; Last    Rx:18Xch2656 Ordered   11  TraMADol HCl - 50 MG Oral Tablet; TAKE 1 TABLET TWICE DAILY AS NEEDED; Therapy: 22RTT7733 to (Evaluate:26Fna5333)  Requested for: 59LHZ2061; Last    Rx:08Jan2016 Ordered   12  Voltaren 1 % Transdermal Gel (Diclofenac Sodium); APPLY TO LOWER EXTREMITIES,    4 GM OF GEL TO AFFECTED AREA 4 TIMES DAILY  DO NOT    APPLY MORE THAN 16 GM DAILY TO ANY ONE AFFECTED JOINT; Therapy: 76Xat6488 to (Claire Naqvi)  Requested for: 39Bot7779; Last    Rx:72Dcx2616; Status: ACTIVE - Transmit to [facility]    Ordered   13  Zetia 10 MG Oral Tablet; TAKE 1 TABLET DAILY; Therapy: 98XSD2134 to [phone])  Requested for: 59EPI4037; Last    Rx:13Oct2015 Ordered   14  Zolpidem Tartrate 10 MG Oral Tablet; TAKE ONE TABLET BY MOUTH AT BEDTIME AS    NEEDED FOR SLEEP; Therapy: 30Apr2013 to (Evaluate:28Oct2016); Last Rx:30Jun2016 Ordered    Allergies    1  ACE Inhibitors   2   Penicillins    Signatures   Electronically signed by : Teresa Ling, ; Aug  8 2016  2:00PM EST                       (Author)

## 2018-01-11 NOTE — MISCELLANEOUS
Message   Recorded as Task   Date: 08/01/2016 11:44 AM, Created By: Faith Garrison   Task Name: Med Renewal Request   Assigned To: Ida Zafar clinical,Team   Regarding Patient: Lydia Maya, Status: Active   Comment:    Faith Garrison - 01 Aug 2016 11:44 AM     TASK CREATED  Caller: Self; Renew Medication; [phone] (Mobile Phone)  Pt left vm today at [phone] that she needs Rf of her gabapentin 300mg, originally took 3 a day but now she has cut back to 2 a day  she is almost out of them  She asked to be sent to [facility] on [address]     S/W pt who has been taking gabapentin 300mg 2 tabs QHS but said it doesn't seem to even help but was told not to stop taking  She stated even when she was taking 3 tabs QHS that didn't help either  Pt said she has enough until next wk  Told pt that if it's not helping then maybe we should dicuss weaning her off of it, pt agreeable to wean off of  it  Pt said she has an appt with the ortho doctor later this month (Dr Benjamín Varghese) and could always call us to go back on the gabapentin if it's needed  I did tell pt she might need to make ov to be seen if the pain gets worse off the gabapentin since she hasn't been seen since March  Pt understood and agreed and would call for ov if needed  Told pt I would discuss weaning instr for the gabpentin w/ Kristin Freshwater and c/b with Elena Rosario - 01 Aug 2016 2:01 PM     TASK REPLIED TO: Previously Assigned To [facility] maricel clinical,Team           to wean off she can take gabapentin once a day for 3 days then stop  Does she have 3 pills left? Raisa Grey - 01 Aug 2016 2:28 PM     TASK EDITED  I had already confirmed with pt during previous call that she had enough gabapentin until next wk  Pt instructed to wean off the gabapentin: she is to take 1 tab once a day for 3 days and then stop  Pt verbalized understanding and agreed  Active Problems    1  Actinic keratosis (702 0) (L57 0)   2   A-fib (427 31) (I48 91)   3  Allergic rhinitis (477 9) (J30 9)   4  Cervical adenitis (289 3) (I88 9)   5  Chest pain (786 50) (R07 9)   6  Dyspepsia (536 8) (K30)   7  Essential hypertension (401 9) (I10)   8  Fibromyalgia (729 1) (M79 7)   9  Hospital discharge follow-up (V67 59) (Z09)   10  Hyperlipidemia (272 4) (E78 5)   11  Iliotibial band syndrome (728 89) (M76 30)   12  Iliotibial band syndrome of both sides (728 89) (M76 31,M76 32)   13  Insomnia (780 52) (G47 00)   14  Low back pain (724 2) (M54 5)   15  Lumbar spondylosis (721 3) (M47 816)   16  Lumbar stenosis (724 02) (M48 06)   17  Lumbosacral radiculopathy (724 4) (M54 17)   18  Osteoarthritis Of Hand (715 94)   19  Osteoarthritis of knee (715 36) (M17 9)   20  Osteopenia (733 90) (M85 80)   21  Paroxysmal atrial fibrillation (427 31) (I48 0)   22  Peripheral neuropathy (356 9) (G62 9)   23  Piriformis syndrome, right (355 0) (G57 01)   24  Restless legs syndrome (333 94) (G25 81)   25  Right lumbar radiculopathy (724 4) (M54 16)   26  Sacroiliac pain (724 6) (M53 3)   27  Sacroiliitis (720 2) (M46 1)   28  Screening for genitourinary condition (V81 6) (Z13 89)   29  Screening for neurological condition (V80 09) (Z13 89)   30  Vitamin D deficiency (268 9) (E55 9)    Current Meds   1  B Complex + C TR TBCR; Therapy: (Recorded:11May2015) to Recorded   2  Black Cohosh 20 MG Oral Tablet; Therapy: (Recorded:11May2015) to Recorded   3  Chromium Picolinate Powder; Therapy: (Recorded:11May2015) to Recorded   4  [employer] Vitamin D 2000 UNIT CAPS; Therapy: (Recorded:11May2015) to Recorded   5  Eliquis 5 MG Oral Tablet; Take 1 tablet twice daily; Therapy: 77MZD0187 to (Evaluate:22Gry6983)  Requested for: 00Fhw7821; Last   Rx:46Bla8094 Ordered   6  Evening Primrose OIL; Therapy: (Recorded:54Yux5623) to Recorded   7  Gabapentin 300 MG Oral Capsule; Take 3 capsules at night;    Therapy: 03JUE1680 to (Evaluate:52Pvi6511)  Requested for: 39Tbb8441; Last   KO:27ZVM5450; Status: ACTIVE - Renewal Denied Ordered   8  Losartan Potassium 100 MG Oral Tablet; take 1 tablet by mouth once daily; Therapy: 09KUM1984 to (Evaluate:83Pay4158)  Requested for: 46KPE0623; Last   Rx:05Jan2016 Ordered   9  ROPINIRole HCl - 0 25 MG Oral Tablet; TAKE ONE TABLET BY MOUTH AT BEDTIME; Therapy: 35KPW7382 to (Evaluate:67Kqj5154)  Requested for: 38XTE2811; Last   Rx:25Jun2016 Ordered   10  Sotalol HCl - 80 MG Oral Tablet; take 1 tablet by mouth twice daily; Therapy: 93TCF3112 to (Evaluate:94Huz0642)  Requested for: 96Jhx9351; Last    Rx:30Wat3562 Ordered   11  TraMADol HCl - 50 MG Oral Tablet; TAKE 1 TABLET TWICE DAILY AS NEEDED; Therapy: 28GXQ5036 to (Evaluate:28Vik8448)  Requested for: 38ZZR1068; Last    Rx:08Jan2016 Ordered   12  Zetia 10 MG Oral Tablet; TAKE 1 TABLET DAILY; Therapy: 03TDG8630 to (21 )  Requested for: 38CXW9105; Last    Rx:13Oct2015 Ordered   13  Zolpidem Tartrate 10 MG Oral Tablet; TAKE ONE TABLET BY MOUTH AT BEDTIME AS    NEEDED FOR SLEEP; Therapy: 72Bbk5842 to (Evaluate:28Oct2016); Last Rx:30Jun2016 Ordered    Allergies    1  ACE Inhibitors   2   Penicillins    Signatures   Electronically signed by : Darvin Cabrales, ; Aug  1 2016  2:29PM EST                       (Author)

## 2018-01-11 NOTE — PROGRESS NOTES
Assessment    1  Acute knee pain, right (980 46) (U20 918)   2  Osteoarthritis of knee (762 36) (M169)     77-year-old female with arthritic right knee today with return of pain she is a modest effusion an aspiration and injection is indicated it is advised except and administered as outlined above I would welcome the opportunity to see her back in the office in 3 months time for repeat physical exam she feels that these injections offered her a significant amount of relief and she has not considered right total knee replacement yet and she had a very rough recovery following her left side     Plan  Osteoarthritis of knee    · Follow-up visit in 3 months Evaluation and Treatment  Follow-up  Status: Hold For -  Scheduling  Requested for: 22RFJ4765    Chief Complaint    1  Knee Pain    History of Present Illness  HPI: 77-year-old female with an arthritic right knee 3 months ago she received an aspiration and injection and reports significant reduction in the right knee pain and significant improvement in function until just recently she does describe return of pain at the level of right knee joint pain which is made worse weightbearing pain that worsens with activities      Review of Systems    Constitutional: No fever, no chills, feels well, no tiredness, no recent weight gain or loss  Eyes: No complaints of eyesight problems, no red eyes  ENT: no loss of hearing, no nosebleeds, no sore throat  Cardiovascular: No complaints of chest pain, no palpitations, no leg claudication or lower extremity edema  Respiratory: no compliants of shortness of breath, no wheezing, no cough  Gastrointestinal: no complaints of abdominal pain, no constipation, no nausea or diarrhea, no vomiting, no bloody stools  Genitourinary: no complaints of dysuria, no incontinence  Musculoskeletal: as noted in HPI  Integumentary: no complaints of skin rash or lesion, no itching or dry skin, no skin wounds     Neurological: no complaints of headache, no confusion, no numbness or tingling, no dizziness  Endocrine: No complaints of muscle weakness, no feelings of weakness, no frequent urination, no excessive thirst    Psychiatric: No suicidal thoughts, no anxiety, no feelings of depression  Active Problems    1  Actinic keratosis (702 0) (L57 0)   2  Acute knee pain, right (719 46) (M25 561)   3  Allergic rhinitis (477 9) (J30 9)   4  Bilateral impacted cerumen (380 4) (H61 23)   5  Dyspepsia (536 8) (K30)   6  Effusion of knee joint right (719 06) (M25 461)   7  Encounter for screening colonoscopy (V76 51) (Z12 11)   8  Essential hypertension (401 9) (I10)   9  Fibromyalgia (729 1) (M79 7)   10  Hyperlipidemia (272 4) (E78 5)   11  Hypertension (401 9) (I10)   12  Iliotibial band syndrome (728 89) (M76 30)   13  Iliotibial band syndrome of both sides (728 89) (M76 31,M76 32)   14  Insomnia (780 52) (G47 00)   15  Low back pain (724 2) (M54 5)   16  Lumbar spondylosis (721 3) (M47 816)   17  Lumbar stenosis (724 02) (M48 06)   18  Lumbosacral radiculopathy (724 4) (M54 17)   19  Need for prophylactic vaccination and inoculation against influenza (V04 81) (Z23)   20  Osteoarthritis Of Hand (715 94)   21  Osteoarthritis of knee (715 36) (M17 9)   22  Osteopenia (733 90) (M85 80)   23  Restless legs syndrome (333 94) (G25 81)   24  Sacroiliac pain (724 6) (M53 3)   25  Sacroiliitis (720 2) (M46 1)   26   Vitamin D deficiency (268 9) (E55 9)    Past Medical History    · History of Benign colon polyp (211 3) (K63 5)   · Fibromyalgia (729 1) (M79 7)   · History of basal cell carcinoma (V10 83) (Z70 502)   · History of esophageal reflux (V12 79) (Z87 19)   · History of hypercholesterolemia (V12 29) (Z86 39)   · History of nummular eczema (V13 3) (Z87 2)   · History of Palpitations (785 1) (R00 2)   · History of Rectal hemorrhage (569 3) (K62 5)   · History of Right Peroneal Tendonitis (206 79)   · History of Trochanteric bursitis, unspecified laterality (726 5) (M70 60)    The active problems and past medical history were reviewed and updated today  Surgical History    · History of Cataract Surgery   · History of Knee Replacement   · History of Rectal Surgery Polypectomy   · History of Tonsillectomy   · History of Total Abdominal Hysterectomy   · History of Tubal Ligation    The surgical history was reviewed and updated today  Family History    · Family history of Coronary Artery Disease (V17 49)   · Family history of cerebrovascular accident (V17 1) (Z82 3)   · Family history of myocardial infarction (V17 3) (Z82 49)    · Family history of Back disorder   · Family history of neuropathy (V17 2) (Z82 0)    · Family history of Coronary Artery Disease (V17 49)   · Family history of Osteoporosis (V17 81)    Social History    · Never A Smoker   · Social alcohol use (F10 99)    Current Meds   1  B Complex + C TR TBCR; Therapy: (Recorded:11May2015) to Recorded   2  Black Cohosh 20 MG Oral Tablet; Therapy: (Recorded:11May2015) to Recorded   3  Chromium Picolinate Powder; Therapy: (Recorded:11May2015) to Recorded   4  CVS Vitamin D 2000 UNIT CAPS; Therapy: (Recorded:11May2015) to Recorded   5  Evening Primrose OIL; Therapy: (Recorded:11May2015) to Recorded   6  Gabapentin 300 MG Oral Capsule; TAKE ONE CAPSULE BY MOUTH THREE TIMES   DAILY; Therapy: 84CPC1005 to (Evaluate:13Mar2016); Last Rx:13Jan2016 Ordered   7  Losartan Potassium 100 MG Oral Tablet; take 1 tablet by mouth once daily; Therapy: 44LFS2924 to (Evaluate:47Poj8366)  Requested for: 85UGZ0310; Last   Rx:05Jan2016 Ordered   8  Propranolol HCl ER 80 MG Oral Capsule Extended Release 24 Hour; Take 1 capsule by   mouth  daily; Therapy: 20VFL5447 to (Evaluate:31Daz3452)  Requested for: 00LKZ0047; Last   Rx:05Jan2016 Ordered   9  ROPINIRole HCl - 0 25 MG Oral Tablet; TAKE ONE TABLET BY MOUTH AT BEDTIME;    Therapy: 83OHA0380 to (Evaluate:89Uyf7500)  Requested for: 25OWV6748; Last   QB:40QQL0901 Ordered   10  TraMADol HCl - 50 MG Oral Tablet; TAKE 1 TABLET TWICE DAILY AS NEEDED; Therapy: 15BFE1032 to (Evaluate:32Amw1117)  Requested for: 88UQI2963; Last    Rx:76Iqh2232 Ordered   11  Zetia 10 MG Oral Tablet; TAKE 1 TABLET DAILY; Therapy: 49QZA0447 to (Nika Pounds)  Requested for: 63XSZ4695; Last    Rx:45Siq4604 Ordered   12  Zolpidem Tartrate 10 MG Oral Tablet; TAKE 1 TABLET AT  BEDTIME AS NEEDED FOR    INSOMNIA; Therapy: 69Bqx6802 to (Last Rx:20Wvh5952) Ordered    Allergies    1  ACE Inhibitors   2  Penicillins    Vitals  Signs [Data Includes: Current Encounter]    Heart Rate: 61  Systolic: 648  Diastolic: 75  Height: 5 ft 2 in  Weight: 163 lb 6 08 oz  BMI Calculated: 29 88  BSA Calculated: 1 75    Physical Exam   Gait pattern is antalgic right eye is devoid of anterior right knee is in varus is a modest effusion there is no warmth extensions good flexion is good there is no mid flexion stability there is crepitation with flexion-extension calf compartments are soft and supple toes are warm sensate and mobile        Procedure    Procedure: Aspiration Injection of the right knee joint  Indication:  Effusion, Joint pain and Osteoarthritis  Risk, benefits and alternatives were discussed with the patient  Verbal consent was obtained prior to the procedure  Alcohol was used to prep the area  ethyl chloride spray was used as a topical anesthetic  Using sterile technique, the aspiration/injection needle was then directed from a lateral aspect  20 mL of clear fluid was aspirated with an 18-gauge  The syringe was changed and the same needle was left in place and was used to inject 2 mL of 1% Lidocaine, 2 mL of 0 25% Bupivacaine and 2 mL of 6mg/mL betamethasone  A bandage was applied  the patient tolerated the procedure well  Complications: none  Follow-up in the office in 3 month(s)        Future Appointments    Date/Time Provider Specialty Site   01/29/2016 08:15 AM Abril Iqbal MD Pain Management [address]     Signatures   Electronically signed by : TRICIA Palm ; Dinh 15 2016  9:55AM EST                       (Author)

## 2018-01-11 NOTE — MISCELLANEOUS
History of Present Illness  TCM Communication [facility]: The patient is being contacted for follow-up after hospitalization  Hospital course was discussed with the inpatient physician  She was hospitalized at [facility]  The date of admission: 06/14/2016, date of discharge: 06/16/2016  Diagnosis: AFIB S/P INITIATION OF SOTALOL  She was discharged to home  Medications reviewed and updated today  She did not schedule a follow up appointment  She refused a follow up appointment due to PATIENT INSTRUCTED TO F/U WITH CARDIOLOGIST   Counseling was provided to the patient  Topics counseled included importance of compliance with treatment  Communication performed and completed by Tito Samayoa      Active Problems    1  Actinic keratosis (702 0) (L57 0)   2  A-fib (427 31) (I48 91)   3  Allergic rhinitis (477 9) (J30 9)   4  Cervical adenitis (289 3) (I88 9)   5  Chest pain (786 50) (R07 9)   6  Dyspepsia (536 8) (K30)   7  Essential hypertension (401 9) (I10)   8  Fibromyalgia (729 1) (M79 7)   9  Hospital discharge follow-up (V67 59) (Z09)   10  Hyperlipidemia (272 4) (E78 5)   11  Iliotibial band syndrome (728 89) (M76 30)   12  Iliotibial band syndrome of both sides (728 89) (M76 31,M76 32)   13  Insomnia (780 52) (G47 00)   14  Low back pain (724 2) (M54 5)   15  Lumbar spondylosis (721 3) (M47 816)   16  Lumbar stenosis (724 02) (M48 06)   17  Lumbosacral radiculopathy (724 4) (M54 17)   18  Osteoarthritis Of Hand (715 94)   19  Osteoarthritis of knee (715 36) (M17 9)   20  Osteopenia (733 90) (M85 80)   21  Paroxysmal atrial fibrillation (427 31) (I48 0)   22  Peripheral neuropathy (356 9) (G62 9)   23  Piriformis syndrome, right (355 0) (G57 01)   24  Restless legs syndrome (333 94) (G25 81)   25  Right lumbar radiculopathy (724 4) (M54 16)   26  Sacroiliac pain (724 6) (M53 3)   27  Sacroiliitis (720 2) (M46 1)   28  Screening for genitourinary condition (V81 6) (Z13 89)   29   Screening for neurological condition (V80 09) (Z13 89)   30  Vitamin D deficiency (268 9) (E55 9)    Past Medical History    1  History of Atrial fibrillation with rapid ventricular response (427 31) (I48 91)   2  History of Benign colon polyp (211 3) (K63 5)   3  History of Effusion of knee joint right (719 06) (M25 461)   4  Fibromyalgia (729 1) (M79 7)   5  History of basal cell carcinoma (V10 83) (Z85 828)   6  History of esophageal reflux (V12 79) (Z87 19)   7  History of nummular eczema (V13 3) (Z87 2)   8  History of Palpitations (785 1) (R00 2)   9  History of Rectal hemorrhage (569 3) (K62 5)   10  History of Right Peroneal Tendonitis (726 79)   11  History of Trochanteric bursitis, unspecified laterality (726 5) (M70 60)    Surgical History    1  History of Cataract Surgery   2  History of Knee Replacement   3  History of Rectal Surgery Polypectomy   4  History of Tonsillectomy   5  History of Total Abdominal Hysterectomy   6  History of Tubal Ligation    Family History  Father    1  Family history of Coronary Artery Disease (V17 49)   2  Family history of cerebrovascular accident (V17 1) (Z82 3)   3  Family history of myocardial infarction (V17 3) (Z82 49)  Unknown    4  Family history of Back disorder   5  Family history of neuropathy (V17 2) (Z82 0)  Family History    6  Family history of Coronary Artery Disease (V17 49)   7  Family history of Osteoporosis (V17 81)    Social History    · Never A Smoker   · Social alcohol use (Z78 9)    Current Meds   1  B Complex + C TR TBCR; Therapy: (Recorded:11May2015) to Recorded   2  Black Cohosh 20 MG Oral Tablet; Therapy: (Recorded:11May2015) to Recorded   3  Chromium Picolinate Powder; Therapy: (Recorded:11May2015) to Recorded   4  CVS Vitamin D 2000 UNIT CAPS; Therapy: (Recorded:11May2015) to Recorded   5  Eliquis 5 MG Oral Tablet; Take 1 tablet twice daily; Therapy: 92RDI9043 to (Evaluate:12Jun2017);  Last Rx:17Jun2016; Status: ACTIVE -   Retrospective Authorization Ordered 6  Evening Primrose OIL; Therapy: (Recorded:51Giz8040) to Recorded   7  Gabapentin 300 MG Oral Capsule; Take 3 capsules at night; Therapy: 31CKO2479 to (Evaluate:38Shj0929)  Requested for: 65LBK5145; Last   AQ:36NXU8699 Ordered   8  Lopressor 100 MG Oral Tablet; TAKE 1 TABLET TWICE DAILY; Therapy: 53ANV5010 to (Evaluate:84Cam2095) Recorded   9  Losartan Potassium 100 MG Oral Tablet; take 1 tablet by mouth once daily; Therapy: 77UPZ0653 to (Evaluate:95Scs9976)  Requested for: 08FCB6195; Last   Rx:05Jan2016 Ordered   10  ROPINIRole HCl - 0 25 MG Oral Tablet; TAKE ONE TABLET BY MOUTH AT BEDTIME; Therapy: 62MJX2221 to (Evaluate:59Ueo5706)  Requested for: 38JLV0826; Last    Rx:05Jan2016 Ordered   11  TraMADol HCl - 50 MG Oral Tablet; TAKE 1 TABLET TWICE DAILY AS NEEDED; Therapy: 36TCX5127 to (Evaluate:67Mbh1248)  Requested for: 54GXM5711; Last    Rx:08Jan2016 Ordered   12  Zetia 10 MG Oral Tablet; TAKE 1 TABLET DAILY; Therapy: 29KUV5044 to (Glenora Button)  Requested for: 49DHD7320; Last    Rx:73Qsw2581 Ordered   13  Zolpidem Tartrate 10 MG Oral Tablet; TAKE ONE TABLET BY MOUTH AT BEDTIME AS    NEEDED FOR SLEEP; Therapy: 86Jzi4333 to (Evaluate:89Xgo9734); Last Rx:41Ofe4775 Ordered    Allergies    1  ACE Inhibitors   2  Penicillins    Message   Recorded as Task   Date: 06/16/2016 01:21 PM, Created By: System   Task Name: Carol Lawler   Assigned To: Artie Santana   Regarding Patient: Nida Olivares, Status: Active   Comment:    System - 16 Jun 2016 1:21 PM     Patient discharged from hospital   Patient Name: Nevin Mckeon  Patient YOB: 1945  Discharge Date: 6/16/2016  Facility: [facility] 17 Jun 2016 9:31 AM     TASK REASSIGNED: Previously Assigned To April Simon     Future Appointments    Date/Time Provider Specialty Site   08/26/2016 09:30 AM TRICIA Ng   Orthopedic Surgery [facility] SURGICAL [facility]   06/23/2016 02:00 PM Cardiology, [facility] CARDIOLOGY [facility]   07/15/2016 03:00 PM Jack Cunningham, DO Cardiology [facility]     Signatures   Electronically signed by : TRICIA Evans ; Jun 18 2016  9:46AM EST                       (Author)

## 2018-01-12 VITALS
HEIGHT: 62 IN | TEMPERATURE: 98.4 F | RESPIRATION RATE: 16 BRPM | HEART RATE: 60 BPM | SYSTOLIC BLOOD PRESSURE: 152 MMHG | WEIGHT: 152.6 LBS | BODY MASS INDEX: 28.08 KG/M2 | DIASTOLIC BLOOD PRESSURE: 80 MMHG

## 2018-01-12 VITALS
WEIGHT: 158 LBS | HEART RATE: 54 BPM | SYSTOLIC BLOOD PRESSURE: 130 MMHG | BODY MASS INDEX: 29.08 KG/M2 | DIASTOLIC BLOOD PRESSURE: 78 MMHG | HEIGHT: 62 IN

## 2018-01-12 NOTE — PROGRESS NOTES
Assessment    1  Low back pain (724 2) (M54 5)   2  Lumbosacral radiculopathy (724 4) (M54 17)   3  Piriformis syndrome, right (355 0) (G57 01)    Discussion/Summary    Follow up visit for acupuncture in 2 weeks  History of Present Illness  follow up visit for acupuncture  patient report pain left gluteal area lasting for 1 day after first treatment  pain resolved  she reports decreased back pain and diminished pain radiating into right leg  no right leg weakness or paresthesias  no bowel or bladder changes  pain management evaluation  improvement with injection for right piriformis syndrome and right SI injection for sacroiliitis  prior right transforaminal steroid injection for right lumbar radiculopathy  medications reviewed  on Gabapentin 300 mg three times a day and prn Tramadol generally 2/day  Active Problems    1  Actinic keratosis (702 0) (L57 0)   2  Acute knee pain, right (719 46) (M25 561)   3  Allergic rhinitis (477 9) (J30 9)   4  Bilateral impacted cerumen (380 4) (H61 23)   5  Cervical adenitis (289 3) (I88 9)   6  Dyspepsia (536 8) (K30)   7  Effusion of knee joint right (719 06) (M25 461)   8  Encounter for screening colonoscopy (V76 51) (Z12 11)   9  Essential hypertension (401 9) (I10)   10  Fibromyalgia (729 1) (M79 7)   11  Hyperlipidemia (272 4) (E78 5)   12  Hypertension (401 9) (I10)   13  Iliotibial band syndrome (728 89) (M76 30)   14  Iliotibial band syndrome of both sides (728 89) (M76 31,M76 32)   15  Insomnia (780 52) (G47 00)   16  Low back pain (724 2) (M54 5)   17  Lumbar spondylosis (721 3) (M47 816)   18  Lumbar stenosis (724 02) (M48 06)   19  Lumbosacral radiculopathy (724 4) (M54 17)   20  Need for prophylactic vaccination and inoculation against influenza (V04 81) (Z23)   21  Osteoarthritis Of Hand (715 94)   22  Osteoarthritis of knee (715 36) (M17 9)   23  Osteopenia (733 90) (M85 80)   24  Peripheral neuropathy (356 9) (G62 9)   25   Piriformis syndrome, right (355 0) (G57 01)   26  Restless legs syndrome (333 94) (G25 81)   27  Right lumbar radiculopathy (724 4) (M54 16)   28  Sacroiliac pain (724 6) (M53 3)   29  Sacroiliitis (720 2) (M46 1)   30  Vitamin D deficiency (268 9) (E55 9)    Past Medical History    1  History of Benign colon polyp (211 3) (K63 5)   2  Fibromyalgia (729 1) (M79 7)   3  History of basal cell carcinoma (V10 83) (Z85 828)   4  History of esophageal reflux (V12 79) (Z87 19)   5  History of hypercholesterolemia (V12 29) (Z86 39)   6  History of nummular eczema (V13 3) (Z87 2)   7  History of Palpitations (785 1) (R00 2)   8  History of Rectal hemorrhage (569 3) (K62 5)   9  History of Right Peroneal Tendonitis (726 79)   10  History of Trochanteric bursitis, unspecified laterality (726 5) (M70 60)    Surgical History    1  History of Cataract Surgery   2  History of Knee Replacement   3  History of Rectal Surgery Polypectomy   4  History of Tonsillectomy   5  History of Total Abdominal Hysterectomy   6  History of Tubal Ligation    Family History    1  Family history of Coronary Artery Disease (V17 49)   2  Family history of cerebrovascular accident (V17 1) (Z82 3)   3  Family history of myocardial infarction (V17 3) (Z82 49)    4  Family history of Back disorder   5  Family history of neuropathy (V17 2) (Z82 0)    6  Family history of Coronary Artery Disease (V17 49)   7  Family history of Osteoporosis (V17 81)    Social History    · Never A Smoker   · Social alcohol use (Z78 9)    Current Meds   1  B Complex + C TR TBCR; Therapy: (Recorded:11May2015) to Recorded   2  Black Cohosh 20 MG Oral Tablet; Therapy: (Recorded:11May2015) to Recorded   3  Chromium Picolinate Powder; Therapy: (Recorded:11May2015) to Recorded   4  [employer] Vitamin D 2000 UNIT CAPS; Therapy: (Recorded:11May2015) to Recorded   5  Evening Primrose OIL; Therapy: (Recorded:11May2015) to Recorded   6   Gabapentin 300 MG Oral Capsule; TAKE ONE CAPSULE BY MOUTH THREE TIMES DAILY; Therapy: 41KCQ0564 to (Kandice Mess)  Requested for: 38ZCR6416; Last   Rx:07Mar2016 Ordered   7  Losartan Potassium 100 MG Oral Tablet; take 1 tablet by mouth once daily; Therapy: 48CBI7983 to (Evaluate:73Ihv3573)  Requested for: 83XGV8827; Last   Rx:05Jan2016 Ordered   8  Propranolol HCl ER 80 MG Oral Capsule Extended Release 24 Hour; Take 1 capsule by   mouth  daily; Therapy: 00EWY7679 to (Evaluate:77Fdu2584)  Requested for: 67QYW5063; Last   Rx:05Jan2016 Ordered   9  ROPINIRole HCl - 0 25 MG Oral Tablet; TAKE ONE TABLET BY MOUTH AT BEDTIME; Therapy: 32MJZ0960 to (Evaluate:46Unp8531)  Requested for: 32EYB0902; Last   Rx:05Jan2016 Ordered   10  TraMADol HCl - 50 MG Oral Tablet; TAKE 1 TABLET TWICE DAILY AS NEEDED; Therapy: 11AFJ8637 to (Evaluate:39Ezk7322)  Requested for: 08QRP0375; Last    Rx:08Jan2016 Ordered   11  Zetia 10 MG Oral Tablet; TAKE 1 TABLET DAILY; Therapy: 24OFY5210 to ([phone])  Requested for: 22IGU0641; Last    Rx:13Oct2015 Ordered   12  Zolpidem Tartrate 10 MG Oral Tablet; TAKE 1 TABLET AT  BEDTIME AS NEEDED FOR    INSOMNIA; Therapy: 30Apr2013 to (Last Rx:62Fpy4857) Ordered    Allergies    1  ACE Inhibitors   2  Penicillins    Procedure    Procedure: Acupuncture   Indication: right piriformis syndrome  right lumbar radiculopathy  Procedure Note:     LUMBAR PENS    L2 L4  S2 S4 @ 4 HZ X 20 MINUTES    PSIS GB 30 @ 100 HZ X 20 MINUTES  HEAT LAMP  Future Appointments    Date/Time Provider Specialty Site   04/15/2016 09:45 AM TRICIA Harvey  Orthopedic Surgery [facility]   04/11/2016 04:00 PM TRICIA Toro   Family Medicine [address]     Signatures   Electronically signed by : TRICIA Stoddard ; Mar 28 2016  5:45PM EST                       (Author)

## 2018-01-12 NOTE — PROGRESS NOTES
Assessment    1  Right lumbar radiculopathy (724 4) (M54 16)    Plan  Right lumbar radiculopathy    · We recommend that you avoid straining your back while lifting ; Status:Complete;   Done:  89GGF1273   · Call [phone] if: The pain seems worse ; Status:Complete;   Done: 34QKY3325    Discussion/Summary    Follow up visit for acupuncture as needed  daily stretching  History of Present Illness  follow up visit for acupuncture  last treatment 04/11/2016  she has had a decrease in her pain level with decreased back pain and diminished pain radiating into right leg  no right leg weakness or paresthesias  no bowel or bladder changes  she had a steroid injection right knee for OA this month  previous pain management evaluation  improvement with injection for right piriformis syndrome and right SI injection for sacroiliitis  prior right transforaminal steroid injection for right lumbar radiculopathy  medications reviewed  on Gabapentin 300 mg three times a day and prn Tramadol generally 2/day  Active Problems    1  Actinic keratosis (702 0) (L57 0)   2  A-fib (427 31) (I48 91)   3  Allergic rhinitis (477 9) (J30 9)   4  Cervical adenitis (289 3) (I88 9)   5  Dyspepsia (536 8) (K30)   6  Essential hypertension (401 9) (I10)   7  Fibromyalgia (729 1) (M79 7)   8  Hospital discharge follow-up (V67 59) (Z09)   9  Hyperlipidemia (272 4) (E78 5)   10  Iliotibial band syndrome (728 89) (M76 30)   11  Iliotibial band syndrome of both sides (728 89) (M76 31,M76 32)   12  Insomnia (780 52) (G47 00)   13  Low back pain (724 2) (M54 5)   14  Lumbar spondylosis (721 3) (M47 816)   15  Lumbar stenosis (724 02) (M48 06)   16  Lumbosacral radiculopathy (724 4) (M54 17)   17  Osteoarthritis Of Hand (715 94)   18  Osteoarthritis of knee (715 36) (M17 9)   19  Osteopenia (733 90) (M85 80)   20  Paroxysmal atrial fibrillation (427 31) (I48 0)   21  Peripheral neuropathy (356 9) (G62 9)   22   Piriformis syndrome, right (355 0) (G57 01)   23  Restless legs syndrome (333 94) (G25 81)   24  Right lumbar radiculopathy (724 4) (M54 16)   25  Sacroiliac pain (724 6) (M53 3)   26  Sacroiliitis (720 2) (M46 1)   27  Screening for genitourinary condition (V81 6) (Z13 89)   28  Screening for neurological condition (V80 09) (Z13 89)   29  Vitamin D deficiency (268 9) (E55 9)    Past Medical History    1  History of Atrial fibrillation with rapid ventricular response (427 31) (I48 91)   2  History of Benign colon polyp (211 3) (K63 5)   3  History of Effusion of knee joint right (719 06) (M25 461)   4  Fibromyalgia (729 1) (M79 7)   5  History of basal cell carcinoma (V10 83) (Z85 828)   6  History of esophageal reflux (V12 79) (Z87 19)   7  History of nummular eczema (V13 3) (Z87 2)   8  History of Palpitations (785 1) (R00 2)   9  History of Rectal hemorrhage (569 3) (K62 5)   10  History of Right Peroneal Tendonitis (726 79)   11  History of Trochanteric bursitis, unspecified laterality (726 5) (M70 60)    Surgical History    1  History of Cataract Surgery   2  History of Knee Replacement   3  History of Rectal Surgery Polypectomy   4  History of Tonsillectomy   5  History of Total Abdominal Hysterectomy   6  History of Tubal Ligation    Family History  Father    1  Family history of Coronary Artery Disease (V17 49)   2  Family history of cerebrovascular accident (V17 1) (Z82 3)   3  Family history of myocardial infarction (V17 3) (Z82 49)  Unknown    4  Family history of Back disorder   5  Family history of neuropathy (V17 2) (Z82 0)  Family History    6  Family history of Coronary Artery Disease (V17 49)   7  Family history of Osteoporosis (V17 81)    Social History    · Never A Smoker   · Social alcohol use (Z78 9)    Current Meds   1  B Complex + C TR TBCR; Therapy: (Recorded:92Fuu7010) to Recorded   2  Black Cohosh 20 MG Oral Tablet; Therapy: (Recorded:74Hne8627) to Recorded   3  Chromium Picolinate Powder;    Therapy: (Recorded:06Nng8243) to Recorded   4  [employer] Vitamin D 2000 UNIT CAPS; Therapy: (Recorded:11May2015) to Recorded   5  Eliquis 5 MG Oral Tablet; Take 1 tablet twice daily; Therapy: 89Yqq3222 to (Evaluate:75Kui4141); Last Rx:25Ptp6342 Ordered   6  Evening Primrose OIL; Therapy: (Recorded:11May2015) to Recorded   7  Gabapentin 300 MG Oral Capsule; Take 3 capsules at night; Therapy: 28STE0569 to (Evaluate:02Pdy8011)  Requested for: 19PVV5771; Last   AQ:51GPE2285 Ordered   8  Losartan Potassium 100 MG Oral Tablet; take 1 tablet by mouth once daily; Therapy: 65ZRM1987 to (Evaluate:70Dxp7000)  Requested for: 56BUA0418; Last   Rx:05Jan2016 Ordered   9  Metoprolol Tartrate 50 MG Oral Tablet; TAKE 1 5 TABLET Twice daily; Therapy: 02Avy5090 to (Evaluate:94Idf3915); Last Rx:96Rrz5597 Ordered   10  ROPINIRole HCl - 0 25 MG Oral Tablet; TAKE ONE TABLET BY MOUTH AT BEDTIME; Therapy: 36GQF4268 to (Evaluate:99Ybg6396)  Requested for: 44PJP1291; Last    Rx:05Jan2016 Ordered   11  TraMADol HCl - 50 MG Oral Tablet; TAKE 1 TABLET TWICE DAILY AS NEEDED; Therapy: 08NAO8873 to (Evaluate:58Gck3002)  Requested for: 60FNG3968; Last    Rx:08Jan2016 Ordered   12  Zetia 10 MG Oral Tablet; TAKE 1 TABLET DAILY; Therapy: 82SVR4803 to ([id number])  Requested for: 14GHE8212; Last    Rx:01Qtm4188 Ordered   13  Zolpidem Tartrate 10 MG Oral Tablet; TAKE ONE TABLET BY MOUTH AT BEDTIME AS    NEEDED FOR SLEEP; Therapy: 63Emk4196 to (Evaluate:23Cuq1758); Last Rx:64Ixl8328 Ordered    Allergies    1  ACE Inhibitors   2  Penicillins    Procedure    Procedure: Acupuncture   Indication: right lumbar radiculopathy  Procedure Note:     LUMBAR PENS    L2 L4  S2 S4 @ 4 HZ X 20 MINUTES    PSIS GB 30 @ 100 HZ X 20 MINUTES  HEAT LAMP  Future Appointments    Date/Time Provider Specialty Site   08/26/2016 09:30 AM TRICIA Hardy   Orthopedic [address] [place] [address]     Signatures   Electronically signed by : TRICIA Nunn ; May 31 2016 10:31AM EST                       (Author)

## 2018-01-13 VITALS
BODY MASS INDEX: 29.35 KG/M2 | WEIGHT: 159.5 LBS | SYSTOLIC BLOOD PRESSURE: 130 MMHG | DIASTOLIC BLOOD PRESSURE: 72 MMHG | HEIGHT: 62 IN | HEART RATE: 53 BPM

## 2018-01-13 NOTE — PROGRESS NOTES
Assessment    1  Cervical adenitis (289 3) (I88 9)    Plan  Cervical adenitis    · Zithromax Z-Rob 250 MG Oral Tablet (Azithromycin); TAKE 2 TABLETS ON DAY 1  THEN TAKE 1 TABLET A DAY FOR 4 DAYS    Discussion/Summary    Trial of antibiotics  consider CT anterior neck/soft tissues if no better in 7 to 10 days  advised to call if any changes  Chief Complaint    1  Sore Throat    History of Present Illness  HPI: several day history of pain right submandibular pain  pain with swallowing  + post nasal drainage  no cough  no fevers  no swelling after eating  no dental pain  Review of Systems    Constitutional: as noted in HPI and no chills  ENT: no earache and no nasal discharge  Cardiovascular: no chest pain  Gastrointestinal: no nausea, no vomiting and no diarrhea  Integumentary: no rashes  Neurological: no headache  Active Problems    1  Actinic keratosis (702 0) (L57 0)   2  Acute knee pain, right (719 46) (M25 561)   3  Allergic rhinitis (477 9) (J30 9)   4  Bilateral impacted cerumen (380 4) (H61 23)   5  Dyspepsia (536 8) (K30)   6  Effusion of knee joint right (719 06) (M25 461)   7  Encounter for screening colonoscopy (V76 51) (Z12 11)   8  Essential hypertension (401 9) (I10)   9  Fibromyalgia (729 1) (M79 7)   10  Hyperlipidemia (272 4) (E78 5)   11  Hypertension (401 9) (I10)   12  Iliotibial band syndrome (728 89) (M76 30)   13  Iliotibial band syndrome of both sides (728 89) (M76 31,M76 32)   14  Insomnia (780 52) (G47 00)   15  Low back pain (724 2) (M54 5)   16  Lumbar spondylosis (721 3) (M47 816)   17  Lumbar stenosis (724 02) (M48 06)   18  Lumbosacral radiculopathy (724 4) (M54 17)   19  Need for prophylactic vaccination and inoculation against influenza (V04 81) (Z23)   20  Osteoarthritis Of Hand (715 94)   21  Osteoarthritis of knee (715 36) (M17 9)   22  Osteopenia (733 90) (M85 80)   23  Restless legs syndrome (333 94) (G25 81)   24  Sacroiliac pain (724 6) (M53 3)   25  Sacroiliitis (720 2) (M46 1)   26  Vitamin D deficiency (268 9) (E55 9)    Past Medical History    1  History of Benign colon polyp (211 3) (K63 5)   2  Fibromyalgia (729 1) (M79 7)   3  History of basal cell carcinoma (V10 83) (Z85 828)   4  History of esophageal reflux (V12 79) (Z87 19)   5  History of hypercholesterolemia (V12 29) (Z86 39)   6  History of nummular eczema (V13 3) (Z87 2)   7  History of Palpitations (785 1) (R00 2)   8  History of Rectal hemorrhage (569 3) (K62 5)   9  History of Right Peroneal Tendonitis (726 79)   10  History of Trochanteric bursitis, unspecified laterality (726 5) (M70 60)    Family History    1  Family history of Coronary Artery Disease (V17 49)   2  Family history of cerebrovascular accident (V17 1) (Z82 3)   3  Family history of myocardial infarction (V17 3) (Z82 49)    4  Family history of Back disorder   5  Family history of neuropathy (V17 2) (Z82 0)    6  Family history of Coronary Artery Disease (V17 49)   7  Family history of Osteoporosis (V17 81)    Social History    · Never A Smoker   · Social alcohol use (F10 99)    Surgical History    1  History of Cataract Surgery   2  History of Knee Replacement   3  History of Rectal Surgery Polypectomy   4  History of Tonsillectomy   5  History of Total Abdominal Hysterectomy   6  History of Tubal Ligation    Current Meds   1  B Complex + C TR TBCR; Therapy: (Recorded:11May2015) to Recorded   2  Black Cohosh 20 MG Oral Tablet; Therapy: (Recorded:11May2015) to Recorded   3  Chromium Picolinate Powder; Therapy: (Recorded:11May2015) to Recorded   4  [employer] Vitamin D 2000 UNIT CAPS; Therapy: (Recorded:11May2015) to Recorded   5  Evening Primrose OIL; Therapy: (Recorded:11May2015) to Recorded   6  Gabapentin 300 MG Oral Capsule; TAKE ONE CAPSULE BY MOUTH THREE TIMES   DAILY; Therapy: 77UVG0971 to (Evaluate:13Mar2016); Last Rx:13Jan2016 Ordered   7   Losartan Potassium 100 MG Oral Tablet; take 1 tablet by mouth once daily; Therapy: 16CXC9809 to (Evaluate:22Rij6895)  Requested for: 80VHJ8718; Last   Rx:05Jan2016 Ordered   8  Propranolol HCl ER 80 MG Oral Capsule Extended Release 24 Hour; Take 1 capsule by   mouth  daily; Therapy: 88FUY3862 to (Evaluate:33Lvg0823)  Requested for: 24LAJ0399; Last   Rx:05Jan2016 Ordered   9  ROPINIRole HCl - 0 25 MG Oral Tablet; TAKE ONE TABLET BY MOUTH AT BEDTIME; Therapy: 08BLM0334 to (Evaluate:01Fee8049)  Requested for: 61QFG4781; Last   Rx:05Jan2016 Ordered   10  TraMADol HCl - 50 MG Oral Tablet; TAKE 1 TABLET TWICE DAILY AS NEEDED; Therapy: 20HOJ1926 to (Evaluate:36Bmt3390)  Requested for: 28KBL3812; Last    Rx:08Jan2016 Ordered   11  Zetia 10 MG Oral Tablet; TAKE 1 TABLET DAILY; Therapy: 45XRG1511 to (Mahogany Ascencio)  Requested for: 82WWJ6438; Last    Rx:13Oct2015 Ordered   12  Zolpidem Tartrate 10 MG Oral Tablet; TAKE 1 TABLET AT  BEDTIME AS NEEDED FOR    INSOMNIA; Therapy: 30Apr2013 to (Last Rx:11Kem6785) Ordered    Allergies    1  ACE Inhibitors   2  Penicillins    Vitals   Recorded: 77Oor9916 11:34AM   Temperature 99 1 F   Heart Rate 64   Respiration 16   Systolic 991   Diastolic 70   Height 5 ft 2 in   Weight 164 lb    BMI Calculated 30   BSA Calculated 1 76     Physical Exam    Constitutional   General appearance: No acute distress, well appearing and well nourished  Head and Face   Palpation of the face and sinuses: No sinus tenderness  Eyes   Conjunctiva and lids: No swelling, erythema or discharge  Ears, Nose, Mouth, and Throat   Otoscopic examination: Tympanic membranes translucent with normal light reflex  Canals patent without erythema  Oropharynx: Normal with no erythema, edema, exudate or lesions  Neck   Neck: Supple, symmetric, trachea midline, no masses  Thyroid: Normal, no thyromegaly  There were no thyroid nodules  Pulmonary   Auscultation of lungs: Clear to auscultation      Cardiovascular   Auscultation of heart: Normal rate and rhythm, normal S1 and S2, no murmurs  Lymphatic   Palpation of lymph nodes in neck: Abnormal   no posterior cervical node enlargement, no submandibular node enlargement and no supraclavicular node enlargement  small < 1 cm right submandibular lymph node  Musculoskeletal   Joints, bones, and muscles: Abnormal   mild trigger point tenderness right sub occipital area  Future Appointments    Date/Time Provider Specialty Site   04/15/2016 09:45 AM TRICIA Gonzalez   Orthopedic Surgery [facility]   03/07/2016 09:30 AM Pratik Lundberg MD Pain Management [address] [place] [address]     Signatures   Electronically signed by : ANNITA Salguero D ; Feb 2 2016 12:29PM EST                       (Author)

## 2018-01-14 VITALS
HEART RATE: 54 BPM | DIASTOLIC BLOOD PRESSURE: 75 MMHG | WEIGHT: 158.44 LBS | HEIGHT: 62 IN | SYSTOLIC BLOOD PRESSURE: 132 MMHG | BODY MASS INDEX: 29.16 KG/M2

## 2018-01-14 VITALS
SYSTOLIC BLOOD PRESSURE: 124 MMHG | TEMPERATURE: 98.1 F | HEART RATE: 56 BPM | HEIGHT: 62 IN | BODY MASS INDEX: 28.79 KG/M2 | RESPIRATION RATE: 14 BRPM | DIASTOLIC BLOOD PRESSURE: 70 MMHG | WEIGHT: 156.44 LBS

## 2018-01-14 VITALS
BODY MASS INDEX: 28.89 KG/M2 | WEIGHT: 157 LBS | SYSTOLIC BLOOD PRESSURE: 122 MMHG | RESPIRATION RATE: 16 BRPM | HEIGHT: 62 IN | HEART RATE: 62 BPM | TEMPERATURE: 99.1 F | DIASTOLIC BLOOD PRESSURE: 72 MMHG

## 2018-01-14 VITALS
BODY MASS INDEX: 29.26 KG/M2 | SYSTOLIC BLOOD PRESSURE: 105 MMHG | DIASTOLIC BLOOD PRESSURE: 64 MMHG | WEIGHT: 159 LBS | HEIGHT: 62 IN | HEART RATE: 60 BPM

## 2018-01-14 VITALS
SYSTOLIC BLOOD PRESSURE: 136 MMHG | BODY MASS INDEX: 29.16 KG/M2 | DIASTOLIC BLOOD PRESSURE: 75 MMHG | HEART RATE: 51 BPM | WEIGHT: 158.44 LBS | HEIGHT: 62 IN

## 2018-01-15 NOTE — MISCELLANEOUS
Assessment    1  Paroxysmal atrial fibrillation (427 31) (I48 0)   2  Essential hypertension (401 9) (I10)   3  Hyperlipidemia (272 4) (E78 5)    Plan  Screening for genitourinary condition    · *VB-Urinary Incontinence Screen (Dx V81 6 Screen for UI); Status:Complete;   Done:  84VOY7096 12:00AM   Performed: In Office; MPQ:57SEZ2686;CEBNZNG; For:Screening for genitourinary condition; Ordered By:Naveen Monsivais;  Screening for neurological condition    · *VB - Fall Risk Assessment  (Dx V80 09 Screen for Neurologic Disorder);  Status:Complete;   Done: 17JTU0164 12:00AM   Performed: In Office; [id number];HRNMBWG; For:Screening for neurological condition; Ordered By:Aly Monsivais; Discussion/Summary  Discussion Summary:   Continue with current medications  Follow up with cardiology  She is going to schedule a follow-up visit for acupuncture  History of Present Illness  TCM Communication [facility]: The patient is being contacted for follow-up after hospitalization  Hospital course was discussed with the inpatient physician  She was hospitalized at [facility]  The date of admission: 16, date of discharge: 16  Diagnosis: AFIB WITH RAPID VENTRICULAR RESPONSE ON ADMISSION WITH SPONTANEOUS CONVERSION TO SINUS RHYTHM  She was discharged to home  Medications reviewed and updated today  She scheduled a follow up appointment  Counseling was provided to the patient  Topics counseled included importance of compliance with treatment  Communication performed and completed by Anabella Romero   HPI: 69year-old female [address] visit  Status post admission for new onset atrial fibrillation with rapid ventricular response  She converted to normal sinus rhythm and was discharged on Eliquis and Cardizem  Calcium channel blocker was changed to Metoprolol as an outpatient  CHADS2 score 1  hospital evaluation  troponin < 0 02  TSH 1 590  Chemistry profile normal including electrolytes  creatinine 0 90   CBC normal  Hemoglobin 15 7  A1c 5 9  Lipid profile cholesterol 172  Triglycerides 89  HDL 50    LFTs normal  Echocardiogram showed normal left systolic function  Ejection fraction 55 60%  no regional wall motion abnormalities  Mildly dilated left atrium  No significant valvular abnormalities  trace MR      Review of Systems  Focused-Female:   Constitutional: no fever, no recent weight gain, no chills and no recent weight loss  ENT: no earache and no sore throat  Cardiovascular: no chest pain, no palpitations and no lower extremity edema  Respiratory: no cough, no orthopnea, no wheezing, no shortness of breath during exertion and no PND  Gastrointestinal: no abdominal pain, no nausea, no vomiting, no constipation, no diarrhea and no blood in stools  Musculoskeletal: history of right lumbar radiculopathy and right piriformis syndrome  stable on Gabapentin and prn Tramadol  Integumentary: no rashes and no skin lesions  Neurological: no headache and no dizziness  Active Problems     1  Actinic keratosis (702 0) (L57 0)   2  Allergic rhinitis (477 9) (J30 9)   3  Cervical adenitis (289 3) (I88 9)   4  Dyspepsia (536 8) (K30)   5  Essential hypertension (401 9) (I10)   6  Fibromyalgia (729 1) (M79 7)   7  Hyperlipidemia (272 4) (E78 5)   8  Iliotibial band syndrome (728 89) (M76 30)   9  Iliotibial band syndrome of both sides (728 89) (M76 31,M76 32)   10  Insomnia (780 52) (G47 00)   11  Low back pain (724 2) (M54 5)   12  Lumbar spondylosis (721 3) (M47 816)   13  Lumbar stenosis (724 02) (M48 06)   14  Lumbosacral radiculopathy (724 4) (M54 17)   15  Osteoarthritis Of Hand (715 94)   16  Osteoarthritis of knee (715 36) (M17 9)   17  Osteopenia (733 90) (M85 80)   18  Peripheral neuropathy (356 9) (G62 9)   19  Piriformis syndrome, right (355 0) (G57 01)   20  Restless legs syndrome (333 94) (G25 81)   21  Right lumbar radiculopathy (724 4) (M54 16)   22  Sacroiliac pain (724 6) (M53 3)   23   Sacroiliitis (720 2) (M46 1)   24  Vitamin D deficiency (268 9) (E55 9)    Hypertension (401 9) (I10)       Atrial fibrillation with rapid ventricular response (427 31) (I48 91)          Past Medical History    1  History of Atrial fibrillation with rapid ventricular response (427 31) (I48 91)   2  History of Benign colon polyp (211 3) (K63 5)   3  History of Effusion of knee joint right (719 06) (M25 461)   4  Fibromyalgia (729 1) (M79 7)   5  History of basal cell carcinoma (V10 83) (Z85 828)   6  History of esophageal reflux (V12 79) (Z87 19)   7  History of nummular eczema (V13 3) (Z87 2)   8  History of Palpitations (785 1) (R00 2)   9  History of Rectal hemorrhage (569 3) (K62 5)   10  History of Right Peroneal Tendonitis (726 79)   11  History of Trochanteric bursitis, unspecified laterality (726 5) (M70 60)    Surgical History    1  History of Cataract Surgery   2  History of Knee Replacement   3  History of Rectal Surgery Polypectomy   4  History of Tonsillectomy   5  History of Total Abdominal Hysterectomy   6  History of Tubal Ligation    Family History    1  Family history of Coronary Artery Disease (V17 49)   2  Family history of cerebrovascular accident (V17 1) (Z82 3)   3  Family history of myocardial infarction (V17 3) (Z82 49)    4  Family history of Back disorder   5  Family history of neuropathy (V17 2) (Z82 0)    6  Family history of Coronary Artery Disease (V17 49)   7  Family history of Osteoporosis (V17 81)    Social History    · Never A Smoker   · Social alcohol use (Z78 9)    Current Meds   1  B Complex + C TR TBCR; Therapy: (Recorded:11May2015) to Recorded   2  Black Cohosh 20 MG Oral Tablet; Therapy: (Recorded:11May2015) to Recorded   3  Chromium Picolinate Powder; Therapy: (Recorded:11May2015) to Recorded   4  [employer]S Vitamin D 2000 UNIT CAPS; Therapy: (Recorded:11May2015) to Recorded   5  Evening Primrose OIL; Therapy: (Recorded:11May2015) to Recorded   6   Gabapentin 300 MG Oral Capsule; TAKE ONE CAPSULE BY MOUTH THREE TIMES   DAILY; Therapy: 90CSQ2929 to (Shelby Mcqueen)  Requested for: 21Apr2016; Last   Rx:07Mar2016; Status: ACTIVE - Renewal Denied Ordered   7  Losartan Potassium 100 MG Oral Tablet; take 1 tablet by mouth once daily; Therapy: 11FUP7194 to (Evaluate:09Qxa5184)  Requested for: 95YEA6616; Last   Rx:05Jan2016 Ordered   8  Propranolol HCl ER 80 MG Oral Capsule Extended Release 24 Hour; Take 1 capsule by   mouth  daily; Therapy: 70ZGA8142 to (Evaluate:55Qqr6254)  Requested for: 04IWG3187; Last   Rx:05Jan2016 Ordered   9  ROPINIRole HCl - 0 25 MG Oral Tablet; TAKE ONE TABLET BY MOUTH AT BEDTIME; Therapy: 39KBS1171 to (Evaluate:14Jkc7214)  Requested for: 47IVM5860; Last   Rx:05Jan2016 Ordered   10  TraMADol HCl - 50 MG Oral Tablet; TAKE 1 TABLET TWICE DAILY AS NEEDED; Therapy: 41WGJ9823 to (Evaluate:15Thb2890)  Requested for: 16GCK0006; Last    Rx:08Jan2016 Ordered   11  Zetia 10 MG Oral Tablet; TAKE 1 TABLET DAILY; Therapy: 64TFB3833 to (01 72 64 30 83)  Requested for: 54GZE1310; Last    Rx:13Oct2015 Ordered   12  Zolpidem Tartrate 10 MG Oral Tablet; TAKE ONE TABLET BY MOUTH AT BEDTIME AS    NEEDED FOR SLEEP; Therapy: 30Apr2013 to (Evaluate:24Dwx1407); Last Rx:99Juy6442 Ordered    Allergies    1  ACE Inhibitors   2  Penicillins    Vitals  Signs [Data Includes: Current Encounter]   Recorded: 29JVG7499 10:37AM   Temperature: 98 2 F  Heart Rate: 68  Respiration: 16  Systolic: 354  Diastolic: 64  Height: 5 ft 2 in  Weight: 163 lb   BMI Calculated: 29 81  BSA Calculated: 1 75    Physical Exam    Constitutional   General appearance: No acute distress, well appearing and well nourished  Eyes   Conjunctiva and lids: No swelling, erythema or discharge  Ears, Nose, Mouth, and Throat   Otoscopic examination: Tympanic membranes translucent with normal light reflex  Canals patent without erythema  Oropharynx: Normal with no erythema, edema, exudate or lesions      Neck Neck: Supple, symmetric, trachea midline, no masses  no JVD  Thyroid: Normal, no thyromegaly  There were no thyroid nodules  Cardiovascular   Auscultation of heart: Normal rate and rhythm, normal S1 and S2, no murmurs  The heart rate was at 68 bpm  Heart sounds: no gallop heard  Carotid pulses: 2+ bilaterally  no bruit heard over the right carotid and no bruit heard over the left carotid  Examination of extremities for edema and/or varicosities: Normal     Abdomen   Abdomen: Non-tender, no masses  Liver and spleen: No hepatomegaly or splenomegaly  Lymphatic   Palpation of lymph nodes in neck: No lymphadenopathy  no anterior cervical node enlargement, no posterior cervical node enlargement, no submandibular node enlargement and no supraclavicular node enlargement  Skin   Skin and subcutaneous tissue: Normal without rashes or lesions      Psychiatric   Mood and affect: Normal        Results/Data  Encounter Results   RDC4IX3 Risk of Stroke 28Apr2016 11:02AM [place]     Test Name Result Flag Reference   HMN4EL9-TXJi - Risk of Stroke 3 2 %     Age: 69 Years  Sex: F  Congestive Heart Failure: No  Hypertension: Yes  Stroke/TIA/Thromboembolism: No  Vascular Disease: No  Diabetes Mellitus: No   QOV6XM0-FZYq - Score 3     Age: 69 Years  Sex: F  Congestive Heart Failure: No  Hypertension: Yes  Stroke/TIA/Thromboembolism: No  Vascular Disease: No  Diabetes Mellitus: No   BWU0EW5-FMXy - Risk Category Moderate-High     Age: 69 Years  Sex: F  Congestive Heart Failure: No  Hypertension: Yes  Stroke/TIA/Thromboembolism: No  Vascular Disease: No  Diabetes Mellitus: No   XFO2YA6-XWBc - Normative Risk For Age And Gender 2 2 %     Age: 69 Years  Sex: F  Congestive Heart Failure: No  Hypertension: Yes  Stroke/TIA/Thromboembolism: No  Vascular Disease: No  Diabetes Mellitus: No     CHADS2 Risk of Stroke 28Apr2016 11:01AM [place]     Test Name Result Flag Reference   CHADS2 - Yearly Risk 2 8 %     Age: 70  Congestive Heart Failure: No  Hypertension: Yes  Diabetes Mellitus: No  Stroke or TIA: No   CHADS2 - Score 1     Age: 69  Congestive Heart Failure: No  Hypertension: Yes  Diabetes Mellitus: No  Stroke or TIA: No     *VB - Fall Risk Assessment  (Dx V80 09 Screen for Neurologic Disorder) 28Apr2016 12:00AM [place]     Test Name Result Flag Reference   Fall Risk Assessment 28Apr2016       *VB-Urinary Incontinence Screen (Dx V81 6 Screen for UI) 57Jcx6231 12:00AM [place]     Test Name Result Flag Reference   Urinary Incontinence Assessment 28Apr2016       Results   (1) FOLATE 56VXG0816 01:13PM [address]     Test Name Result Flag Reference   FOLATE >20 0 ng/mL H 3 1-17 5     (1) VITAMIN B12 21AWX9073 01:11PM [address]     Test Name Result Flag Reference   VITAMIN B12 598 pg/mL  100-900     (1) LEAD 68FCD0932 01:11PM [address]   Performed at:  [address] [place] [address]  [id number]  : Guadalupe Wilson MD, Phone:  [phone]     Test Name Result Flag Reference   LEAD 2 ug/dL  0 - 19   Environmental Exposure:                           WHO Recommendation    <20                          Occupational Exposure:                           OSHA Lead Std          40                           JOSHUA                    30                                Detection Limit =  1     (1) VITAMIN B6 57IXN2044 01:11PM [address]   Performed at:  [address]  [id number]  : Lee Aldana MD, Phone:  [phone]     Test Name Result Flag Reference   VITAMIN B6 72 1 ug/L H 2 0 - 32 8     Message   Recorded as Task   Date: 04/22/2016 06:31 AM, Created By: System   Task Name: Wesley Oseguera   Assigned To: Brigette Sutton   Regarding Patient: Jairo López, Status: Active   Comment:    System - 22 Apr 2016 6:31 AM     Patient discharged from hospital   Patient Name: Jairo López  Patient Date of Birth: 1945  Discharge Date: 04/21/2016  Facility: [facility] - 22 Apr 2016 8:35 AM     TASK REASSIGNED: Previously Assigned To Beto Booker     Future Appointments    Date/Time Provider Specialty Site   05/24/2016 01:00 PM Ramonita Eldridge, DO Cardiology [address]   05/05/2016 04:00 PM TRICIA Talbot   Family Medicine [address] [facility]     Signatures   Electronically signed by : TRICIA Alicea ; Apr 28 2016 12:07PM EST                       (Author)

## 2018-01-16 NOTE — PROGRESS NOTES
Assessment    1  Right lumbar radiculopathy (724 4) (M54 16)   2  Piriformis syndrome, right (355 0) (G57 01)   3  Sacroiliac pain (724 6) (M53 3)    Discussion/Summary    Follow up visit for acupuncture in 7 to 10 days  History of Present Illness  here for acupuncture  recurrent lower back pain  intermittent pain radiating into right leg  no right leg weakness or paresthesias  no bowel or bladder changes  pain management evaluation  improvement with injection for right piriformis syndrome and right SI injection for sacroiliitis  she has also had right transforaminal steroid injection for right lumbar radiculopathy  medications reviewed  she is currently on Gabapentin 300 mg three times a day and prn Tramadol  Review of Systems    Musculoskeletal: myalgias and recurrent neck and upper back pain  , but as noted in HPI  Active Problems    1  Actinic keratosis (702 0) (L57 0)   2  Acute knee pain, right (719 46) (M25 561)   3  Allergic rhinitis (477 9) (J30 9)   4  Bilateral impacted cerumen (380 4) (H61 23)   5  Cervical adenitis (289 3) (I88 9)   6  Dyspepsia (536 8) (K30)   7  Effusion of knee joint right (719 06) (M25 461)   8  Encounter for screening colonoscopy (V76 51) (Z12 11)   9  Essential hypertension (401 9) (I10)   10  Fibromyalgia (729 1) (M79 7)   11  Hyperlipidemia (272 4) (E78 5)   12  Hypertension (401 9) (I10)   13  Iliotibial band syndrome (728 89) (M76 30)   14  Iliotibial band syndrome of both sides (728 89) (M76 31,M76 32)   15  Insomnia (780 52) (G47 00)   16  Low back pain (724 2) (M54 5)   17  Lumbar spondylosis (721 3) (M47 816)   18  Lumbar stenosis (724 02) (M48 06)   19  Lumbosacral radiculopathy (724 4) (M54 17)   20  Need for prophylactic vaccination and inoculation against influenza (V04 81) (Z23)   21  Osteoarthritis Of Hand (715 94)   22  Osteoarthritis of knee (715 36) (M17 9)   23  Osteopenia (733 90) (M85 80)   24  Peripheral neuropathy (356 9) (G62 9)   25  Piriformis syndrome, right (355 0) (G57 01)   26  Restless legs syndrome (333 94) (G25 81)   27  Sacroiliac pain (724 6) (M53 3)   28  Sacroiliitis (720 2) (M46 1)   29  Vitamin D deficiency (268 9) (E55 9)    Past Medical History    1  History of Benign colon polyp (211 3) (K63 5)   2  Fibromyalgia (729 1) (M79 7)   3  History of basal cell carcinoma (V10 83) (Z85 828)   4  History of esophageal reflux (V12 79) (Z87 19)   5  History of hypercholesterolemia (V12 29) (Z86 39)   6  History of nummular eczema (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) (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 2)   7  History of Palpitations (785 1) (R00 2)   8  History of Rectal hemorrhage (569 3) (K62 5)   9  History of Right Peroneal Tendonitis (726 79)   10  History of Trochanteric bursitis, unspecified laterality (726 5) (M70 60)    Surgical History    1  History of Cataract Surgery   2  History of Knee Replacement   3  History of Rectal Surgery Polypectomy   4  History of Tonsillectomy   5  History of Total Abdominal Hysterectomy   6  History of Tubal Ligation    Family History    1  Family history of Coronary Artery Disease (V17 49)   2  Family history of cerebrovascular accident (V17 1) (Z82 3)   3  Family history of myocardial infarction (V17 3) (Z82 49)    4  Family history of Back disorder   5  Family history of neuropathy (V17 2) (Z82 0)    6  Family history of Coronary Artery Disease (V17 49)   7  Family history of Osteoporosis (V17 81)    Social History    · Never A Smoker   · Social alcohol use (Z78 9)    Current Meds   1  B Complex + C TR TBCR; Therapy: (Recorded:11May2015) to Recorded   2  Black Cohosh 20 MG Oral Tablet; Therapy: (Recorded:11May2015) to Recorded   3  Chromium Picolinate Powder; Therapy: (Recorded:11May2015) to Recorded   4  [employer]S Vitamin D 2000 UNIT CAPS; Therapy: (Recorded:11May2015) to Recorded   5  Evening Primrose OIL; Therapy: (Recorded:11May2015) to Recorded   6  Gabapentin 300 MG Oral Capsule; TAKE ONE CAPSULE BY MOUTH THREE TIMES   DAILY;    Therapy: 03AUP9951 to (Balaji Pulling)  Requested for: 75FXR4367; Last   Rx:07Mar2016 Ordered   7  Losartan Potassium 100 MG Oral Tablet; take 1 tablet by mouth once daily; Therapy: 02WPN8086 to (Evaluate:82Viv1720)  Requested for: 75PMM6608; Last   Rx:05Jan2016 Ordered   8  Propranolol HCl ER 80 MG Oral Capsule Extended Release 24 Hour; Take 1 capsule by   mouth  daily; Therapy: 10NGH7525 to (Evaluate:25Ald8396)  Requested for: 57IFK1283; Last   Rx:05Jan2016 Ordered   9  ROPINIRole HCl - 0 25 MG Oral Tablet; TAKE ONE TABLET BY MOUTH AT BEDTIME; Therapy: 20SQO0722 to (Evaluate:67Sye6883)  Requested for: 58DGE8529; Last   Rx:05Jan2016 Ordered   10  TraMADol HCl - 50 MG Oral Tablet; TAKE 1 TABLET TWICE DAILY AS NEEDED; Therapy: 35OIJ7084 to (Evaluate:94Tru6175)  Requested for: 52YMX6465; Last    Rx:08Jan2016 Ordered   11  Zetia 10 MG Oral Tablet; TAKE 1 TABLET DAILY; Therapy: 55LQY5220 to (Jeneal Mate)  Requested for: 59OMA6066; Last    Rx:13Oct2015 Ordered   12  Zolpidem Tartrate 10 MG Oral Tablet; TAKE 1 TABLET AT  BEDTIME AS NEEDED FOR    INSOMNIA; Therapy: 96Uvm2755 to (Last Rx:61Tac5085) Ordered    Allergies    1  ACE Inhibitors   2  Penicillins    Procedure    Procedure: Acupuncture   Indication: right piriformis syndrome  right lumbar radiculopathy  Procedure Note:     LUMBAR PENS    L2 L4  S2 S4 @ 4 HZ X 20 MINUTES    PSIS GB 30 @ 100 HZ X 20 MINUTES  HEAT LAMP  GV 20, GV 16, GV 14  BL 10, BL 11  GB 20, GB 21  TH 15   SI 11, SI 13      Future Appointments    Date/Time Provider Specialty Site   04/15/2016 09:45 AM TRICIA Toribio   Orthopedic Surgery [facility]   03/28/2016 04:30 PM TRICIA Callaway MD, [place] [address]   Electronically signed by : Zerita Boxer, MDM D M D ,MD; Mar 17 2016  8:44PM EST                       (Author)

## 2018-01-16 NOTE — MISCELLANEOUS
Message   Recorded as Task   Date: 05/04/2016 09:35 AM, Created By: Cristiana Coe   Task Name: Med Renewal Request   Assigned To: Brown Huggins   Regarding Patient: Gabriela Rodas, Status: Active   CommentLannie Epp - 04 May 2016 9:35 AM     TASK CREATED  Pt LM on [facility] procedure line requesting a refill on her Gabapentin  Pt can be reached at [phone]  Raisa Grey - 04 May 2016 10:28 AM     TASK EDITED  Left vm on number provided for pt to c/b to discuss  Raisa Grey - 04 May 2016 10:28 AM     TASK IN PROGRESS   Raisa Grey - 04 May 2016 2:50 PM     TASK EDITED  S/W pt who needs RF of gabapentin  Pt said Dr Antonia Alvarado had her change and take 300mg (3) tabs QHS instead of TID b/c she had too much dizziness when she took it TID  Dizziness better now that she takes all 3 tabs at HS  Pt has enough for 1 wk yet  No pending povs  Uses [facility] on file  Told pt I would c/b tomorrow once RF sent to her pharmacy  Elena Cross - 05 May 2016 8:02 AM     TASK REPLIED TO: Previously Assigned To Elena Cross  GABAPENTIN REFILL SENT   Venecia Geller - 05 May 2016 8:30 AM     TASK EDITED  Pt aware  Active Problems    1  Actinic keratosis (702 0) (L57 0)   2  Allergic rhinitis (477 9) (J30 9)   3  Cervical adenitis (289 3) (I88 9)   4  Dyspepsia (536 8) (K30)   5  Essential hypertension (401 9) (I10)   6  Fibromyalgia (729 1) (M79 7)   7  Hyperlipidemia (272 4) (E78 5)   8  Iliotibial band syndrome (728 89) (M76 30)   9  Iliotibial band syndrome of both sides (728 89) (M76 31,M76 32)   10  Insomnia (780 52) (G47 00)   11  Low back pain (724 2) (M54 5)   12  Lumbar spondylosis (721 3) (M47 816)   13  Lumbar stenosis (724 02) (M48 06)   14  Lumbosacral radiculopathy (724 4) (M54 17)   15  Osteoarthritis Of Hand (715 94)   16  Osteoarthritis of knee (715 36) (M17 9)   17  Osteopenia (733 90) (M85 80)   18  Paroxysmal atrial fibrillation (427 31) (I48 0)   19  Peripheral neuropathy (356 9) (G62 9)   20  Piriformis syndrome, right (355 0) (G57 01)   21  Restless legs syndrome (333 94) (G25 81)   22  Right lumbar radiculopathy (724 4) (M54 16)   23  Sacroiliac pain (724 6) (M53 3)   24  Sacroiliitis (720 2) (M46 1)   25  Screening for genitourinary condition (V81 6) (Z13 89)   26  Screening for neurological condition (V80 09) (Z13 89)   27  Vitamin D deficiency (268 9) (E55 9)    Current Meds   1  B Complex + C TR TBCR; Therapy: (Recorded:11May2015) to Recorded   2  Black Cohosh 20 MG Oral Tablet; Therapy: (Recorded:11May2015) to Recorded   3  Chromium Picolinate Powder; Therapy: (Recorded:11May2015) to Recorded   4  CVS Vitamin D 2000 UNIT CAPS; Therapy: (Recorded:11May2015) to Recorded   5  Eliquis 5 MG Oral Tablet; Take 1 tablet twice daily; Therapy: 26Apr2016 to (Evaluate:22Abc5349); Last Rx:26Apr2016 Ordered   6  Evening Primrose OIL; Therapy: (Recorded:11May2015) to Recorded   7  Gabapentin 300 MG Oral Capsule; Take 3 capsules at night; Therapy: 79LZT5179 to (Evaluate:18Sve7778)  Requested for: 74JLX4290; Last   ZF:55NEW4790 Ordered   8  Losartan Potassium 100 MG Oral Tablet; take 1 tablet by mouth once daily; Therapy: 74YPG0736 to (Evaluate:59Nar3206)  Requested for: 29FUR1745; Last   Rx:05Jan2016 Ordered   9  Metoprolol Tartrate 50 MG Oral Tablet; TAKE 1 TABLET TWICE DAILY; Therapy: 97JJJ4145 to (Evaluate:23Oct2016); Last Rx:26Apr2016 Ordered   10  ROPINIRole HCl - 0 25 MG Oral Tablet; TAKE ONE TABLET BY MOUTH AT BEDTIME; Therapy: 07UOK0011 to (Evaluate:58Bha9716)  Requested for: 32MHH3900; Last    Rx:05Jan2016 Ordered   11  TraMADol HCl - 50 MG Oral Tablet; TAKE 1 TABLET TWICE DAILY AS NEEDED; Therapy: 55ZEH6695 to (Evaluate:21Rdx9255)  Requested for: 74BOB2336; Last    Rx:08Jan2016 Ordered   12  Zetia 10 MG Oral Tablet; TAKE 1 TABLET DAILY; Therapy: 61UMM5654 to (Josh Alanis)  Requested for: 41HCT6901;  Last    Rx:13Oct2015 Ordered   13  Zolpidem Tartrate 10 MG Oral Tablet; TAKE ONE TABLET BY MOUTH AT BEDTIME AS    NEEDED FOR SLEEP; Therapy: 30Apr2013 to (Evaluate:34Kji0635); Last Rx:22Apr2016 Ordered    Allergies    1  ACE Inhibitors   2   Penicillins    Signatures   Electronically signed by : Ela Cotto RN; May  5 2016  8:30AM EST                       (Author)

## 2018-01-17 NOTE — MISCELLANEOUS
Message     Recorded as Task   Date: 11/07/2016 04:47 PM, Created By: Margarita Xiong   Task Name: Med Renewal Request   Assigned To: Lesli Cloud clinical,Team   Regarding Patient: Zarina Casiano, Status: Active   Comment:    Raisa Grey - 07 Nov 2016 4:47 PM     TASK CREATED  Caller: Self; Renew Medication; [phone] (Mobile Phone)  Pt left vm at 3:30 that she needs Rf of her gabapentin 300mg 2 caps in the evening  She would like sent to her [facility] on [facility]  This was last prescribed on 8/8/16 w/ 2 refills by NM, pt was told at that time she would need a 3 month ov or have her PCP take over prescribing  Now pt is calling for RF  I attempted to contact pt on number provided, got ans machine left, I vm that we require 48 hrs for med RF, our office will c/b once refill sent to pharmacy  No pending povs   Please advise  Elena Cross - 07 Nov 2016 5:21 PM     TASK REPLIED TO: Previously Assigned To SPA maricel clinical,Team  I will refill  for one month, but needs ov  please sched   Raisa Grey - 08 Nov 2016 8:43 AM     TASK EDITED  Pt informed 1 month RF of gabapentin sent to her pharmacy  Pt informed a 1 month ov needed for further RF  Pt given sovs for 12/1/16 w/ NM  Active Problems    1  Actinic keratosis (702 0) (L57 0)   2  Allergic rhinitis (477 9) (J30 9)   3  Atrial fibrillation (427 31) (I48 91)   4  Cervical adenitis (289 3) (I88 9)   5  Chest pain (786 50) (R07 9)   6  Dyspepsia (536 8) (K30)   7  Essential hypertension (401 9) (I10)   8  Fibromyalgia (729 1) (M79 7)   9  Greater trochanteric bursitis of right hip (726 5) (M70 61)   10  Hospital discharge follow-up (V67 59) (Z09)   11  Hyperlipidemia (272 4) (E78 5)   12  Iliotibial band syndrome (728 89) (M76 30)   13  Iliotibial band syndrome of both sides (728 89) (M76 31,M76 32)   14  Insomnia (780 52) (G47 00)   15  Low back pain (724 2) (M54 5)   16  Lumbar spondylosis (721 3) (E14 276)   17   Lumbar stenosis (724 02) (M48 06)   18  Lumbosacral radiculopathy (724 4) (M54 17)   19  Need for prophylactic vaccination and inoculation against influenza (V04 81) (Z23)   20  Osteoarthritis Of Hand (715 94)   21  Osteoarthritis of knee (715 36) (M17 9)   22  Osteopenia (733 90) (M85 80)   23  Paroxysmal atrial fibrillation (427 31) (I48 0)   24  Peripheral neuropathy (356 9) (G62 9)   25  Piriformis syndrome, right (355 0) (G57 01)   26  Restless legs syndrome (333 94) (G25 81)   27  Right knee pain (719 46) (M25 561)   28  Right lumbar radiculopathy (724 4) (M54 16)   29  Sacroiliac pain (724 6) (M53 3)   30  Sacroiliitis (720 2) (M46 1)   31  Screening for genitourinary condition (V81 6) (Z13 89)   32  Screening for neurological condition (V80 09) (Z13 89)   33  Vitamin D deficiency (268 9) (E55 9)    Current Meds   1  B Complex + C TR TBCR; Therapy: (Recorded:13Ewf4576) to Recorded   2  Black Cohosh 20 MG Oral Tablet; Therapy: (Recorded:21Muy4419) to Recorded   3  Chromium Picolinate Powder; Therapy: (Recorded:09Avg5842) to Recorded   4  CVS Vitamin D 2000 UNIT CAPS; Therapy: (Recorded:74Vhy4066) to Recorded   5  Eliquis 5 MG Oral Tablet; Take 1 tablet twice daily; Therapy: 28HCO4621 to (Evaluate:88Xoy8561)  Requested for: 88Emx2678; Last   Rx:94Yox3583 Ordered   6  Evening Primrose OIL; Therapy: (Recorded:23Het2555) to Recorded   7  Gabapentin 300 MG Oral Capsule; TAKE 2 CAPSULES AT NIGHT; Therapy: 36MZY3331 to (Evaluate:92Xve7476)  Requested for: 92TVP6480; Last   Rx:07Nov2016 Ordered   8  Losartan Potassium 100 MG Oral Tablet; take 1 tablet by mouth once daily; Therapy: 77CYX1846 to (Evaluate:34Piu3415)  Requested for: 51IGT2649; Last   Rx:05Jan2016 Ordered   9  ROPINIRole HCl - 0 25 MG Oral Tablet; TAKE ONE TABLET BY MOUTH AT BEDTIME; Therapy: 84VNX0403 to (Evaluate:10Vxx3782)  Requested for: 48VNE3914; Last   Rx:25Jun2016 Ordered   10   Sotalol HCl - 80 MG Oral Tablet; take 1 tablet by mouth twice daily; Therapy: 57WFU7047 to (Evaluate:04War8979)  Requested for: 73Isu3570; Last    Rx:40Lag3168 Ordered   11  TraMADol HCl - 50 MG Oral Tablet; TAKE 1 TABLET TWICE DAILY AS NEEDED; Therapy: 21VOP1586 to (Evaluate:13Tkr3928)  Requested for: 36GWW8366; Last    Rx:52Ujr6877 Ordered   12  Zetia 10 MG Oral Tablet; TAKE 1 TABLET DAILY; Therapy: 47JZV2083 to (Miriam Agustin)  Requested for: 60LDO5102; Last    Rx:07Nov2016 Ordered   13  Zolpidem Tartrate 10 MG Oral Tablet; TAKE ONE TABLET BY MOUTH AT BEDTIME AS    NEEDED FOR SLEEP; Therapy: 21Net6078 to (Evaluate:16Jan2017)  Requested for: 74OZF3813; Last    Rx:14Jbg2405 Ordered    Allergies    1  ACE Inhibitors   2   Penicillins    Signatures   Electronically signed by : Shon Bryan, ; Nov 8 2016  8:43AM EST                       (Author)

## 2018-01-17 NOTE — MISCELLANEOUS
Message   Recorded as Task   Date: 03/14/2016 10:03 AM, Created By: Henrry Frazier   Task Name: Follow Up   Assigned To: [facility] mar[facility]el clinical,Team   Regarding Patient: Cathy Richardson, Status: In Progress   Comment:    Elena Cross - 14 Mar 2016 10:03 AM     TASK CREATED  please let patient know her blood work came back appropriate  Krystle Soto - 14 Mar 2016 10:43 AM     TASK EDITED    Attempted to contact pt, LMOM for pt to CB  Ok to [facility] per chart  *********   Krystle Soto - 14 Mar 2016 2:30 PM     TASK EDITED    Pt LM on [facility] triage line today at [phone], returning my call, requesting CB at [phone]    Attempted to contact pt, left detailed message that bld work came back appropriate  Advised pt to CB with any questions  ***********        Active Problems    1  Actinic keratosis (702 0) (L57 0)   2  Acute knee pain, right (719 46) (M25 561)   3  Allergic rhinitis (477 9) (J30 9)   4  Bilateral impacted cerumen (380 4) (H61 23)   5  Cervical adenitis (289 3) (I88 9)   6  Dyspepsia (536 8) (K30)   7  Effusion of knee joint right (719 06) (M25 461)   8  Encounter for screening colonoscopy (V76 51) (Z12 11)   9  Essential hypertension (401 9) (I10)   10  Fibromyalgia (729 1) (M79 7)   11  Hyperlipidemia (272 4) (E78 5)   12  Hypertension (401 9) (I10)   13  Iliotibial band syndrome (728 89) (M76 30)   14  Iliotibial band syndrome of both sides (728 89) (M76 31,M76 32)   15  Insomnia (780 52) (G47 00)   16  Low back pain (724 2) (M54 5)   17  Lumbar spondylosis (721 3) (M47 816)   18  Lumbar stenosis (724 02) (M48 06)   19  Lumbosacral radiculopathy (724 4) (M54 17)   20  Need for prophylactic vaccination and inoculation against influenza (V04 81) (Z23)   21  Osteoarthritis Of Hand (715 94)   22  Osteoarthritis of knee (715 36) (M17 9)   23  Osteopenia (733 90) (M85 80)   24  Peripheral neuropathy (356 9) (G62 9)   25  Piriformis syndrome, right (355 0) (G57 01)   26   Restless legs syndrome (333 94) (G25 81)   27  Sacroiliac pain (724 6) (M53 3)   28  Sacroiliitis (720 2) (M46 1)   29  Vitamin D deficiency (268 9) (E55 9)    Current Meds   1  B Complex + C TR TBCR; Therapy: (Recorded:11May2015) to Recorded   2  Black Cohosh 20 MG Oral Tablet; Therapy: (Recorded:11May2015) to Recorded   3  Chromium Picolinate Powder; Therapy: (Recorded:11May2015) to Recorded   4  CVS Vitamin D 2000 UNIT CAPS; Therapy: (Recorded:11May2015) to Recorded   5  Evening Primrose OIL; Therapy: (Recorded:11May2015) to Recorded   6  Gabapentin 300 MG Oral Capsule; TAKE ONE CAPSULE BY MOUTH THREE TIMES   DAILY; Therapy: 64TGB7546 to (Angelo Barnhart)  Requested for: 57DLE3225; Last   Rx:07Mar2016 Ordered   7  Losartan Potassium 100 MG Oral Tablet; take 1 tablet by mouth once daily; Therapy: 34ULT4924 to (Evaluate:31Lhv7237)  Requested for: 49UFW2380; Last   Rx:05Jan2016 Ordered   8  Propranolol HCl ER 80 MG Oral Capsule Extended Release 24 Hour; Take 1 capsule by   mouth  daily; Therapy: 16IRJ4701 to (Evaluate:90Njg1683)  Requested for: 46SZV5529; Last   Rx:05Jan2016 Ordered   9  ROPINIRole HCl - 0 25 MG Oral Tablet; TAKE ONE TABLET BY MOUTH AT BEDTIME; Therapy: 18ZXD3531 to (Evaluate:10Bad5465)  Requested for: 57ORH6618; Last   Rx:05Jan2016 Ordered   10  TraMADol HCl - 50 MG Oral Tablet; TAKE 1 TABLET TWICE DAILY AS NEEDED; Therapy: 13DAJ9746 to (Evaluate:30Uii8079)  Requested for: 03KRZ6362; Last    Rx:08Jan2016 Ordered   11  Zetia 10 MG Oral Tablet; TAKE 1 TABLET DAILY; Therapy: 76RIR2761 to (Normajean Mortimer)  Requested for: 01OYP9904; Last    Rx:13Oct2015 Ordered   12  Zolpidem Tartrate 10 MG Oral Tablet; TAKE 1 TABLET AT  BEDTIME AS NEEDED FOR    INSOMNIA; Therapy: 30Apr2013 to (Last Rx:07Dec2015) Ordered    Allergies    1  ACE Inhibitors   2   Penicillins    Signatures   Electronically signed by : Unique Snider, ; Mar 16 2016  8:35AM EST                       (Author)

## 2018-01-22 VITALS
HEIGHT: 62 IN | WEIGHT: 158 LBS | SYSTOLIC BLOOD PRESSURE: 146 MMHG | BODY MASS INDEX: 29.08 KG/M2 | DIASTOLIC BLOOD PRESSURE: 67 MMHG | HEART RATE: 58 BPM

## 2018-01-23 VITALS
WEIGHT: 154 LBS | HEART RATE: 60 BPM | SYSTOLIC BLOOD PRESSURE: 111 MMHG | DIASTOLIC BLOOD PRESSURE: 64 MMHG | BODY MASS INDEX: 28.34 KG/M2 | HEIGHT: 62 IN

## 2018-01-23 NOTE — MISCELLANEOUS
Message     Recorded as Task   Date: 12/06/2017 09:16 AM, Created By: Mark Gustafson   Task Name: Miscellaneous   Assigned To: Gertrude Wu clinical,Team   Regarding Patient: Carmen Patiño, Status: Active   CommentArmida Desir - 06 Dec 2017 9:16 AM     TASK CREATED  Pt stated she called last week or the beginning of this week to request a refill on her Gabapentin 300mg x2 at bedtime  I do not see any notes pertaining to this in allscripts  Has enough for 3 nights left  Please call [phone]  Raisa Grey - 06 Dec 2017 9:44 AM     TASK EDITED  I informed pt that she has not been seen since 12/2016 so I can get her a 1 month RF and I need to schedule her a 4 wk sovs for further gabapentin RF  Pt given sovs for 12/27/17 w/ QUINTERO  Gabapentin was LP on 6/5/17 w/ 5 RF  Prospective RX for gabapentin 300mg (2) caps at HS, #60 with no RF placed in allscripts  **Per pt no need to c/b once sent, her pharmacy will contact her **   Елена Grayson - 06 Dec 2017 10:57 AM     TASK REPLIED TO: Previously Assigned To Елена Grayson md aware   e-rx approved        Active Problems    1  Allergic rhinitis (477 9) (J30 9)   2  Atrial fibrillation (427 31) (I48 91)   3  Essential hypertension (401 9) (I10)   4  Fibromyalgia (729 1) (M79 7)   5  Greater trochanteric bursitis of right hip (726 5) (M70 61)   6  Hip bursitis, left (726 5) (M70 72)   7  Hip bursitis, right (726 5) (M70 71)   8  Hyperlipidemia (272 4) (E78 5)   9  Iliotibial band syndrome (728 89) (M76 30)   10  Iliotibial band syndrome of both sides (728 89) (M76 31,M76 32)   11  Insomnia (780 52) (G47 00)   12  Low back pain (724 2) (M54 5)   13  Lumbar spondylosis (721 3) (M47 816)   14  Lumbar stenosis (724 02) (M48 061)   15  Lumbosacral radiculopathy (724 4) (M54 17)   16  Need for prophylactic vaccination and inoculation against influenza (V04 81) (Z23)   17  Osteoarthritis Of Hand (742 94)   18  Osteoarthritis of knee (081 13) (M17 10)   19   Osteopenia (733 90) (M85 80)   20  Paroxysmal atrial fibrillation (427 31) (I48 0)   21  Peripheral neuropathy (356 9) (G62 9)   22  Piriformis syndrome, right (355 0) (G57 01)   23  Restless legs syndrome (333 94) (G25 81)   24  Right lumbar radiculopathy (724 4) (M54 16)   25  Sacroiliac pain (724 6) (M53 3)   26  Sacroiliitis (720 2) (M46 1)   27  TMJ syndrome (524 69) (M26 629)   28  URI (upper respiratory infection) (465 9) (J06 9)   29  Vitamin D deficiency (268 9) (E55 9)    Current Meds   1  AmLODIPine Besylate 2 5 MG Oral Tablet; take one tablet by mouth one time daily; Therapy: 45KJC1653 to (Evaluate:83Ygo2629)  Requested for: 49FZA1650; Last   Rx:13Nov2017 Ordered   2  B Complex + C TR TBCR; Therapy: (Recorded:02Vcx5086) to Recorded   3  Black Cohosh 20 MG Oral Tablet; Therapy: (Recorded:95Bct4918) to Recorded   4  Chromium Picolinate Powder; Therapy: (Recorded:55Ibb0162) to Recorded   5  CVS Vitamin D 2000 UNIT CAPS; Therapy: (Recorded:41Oot5068) to Recorded   6  Eliquis 5 MG Oral Tablet; Take 1 tablet twice daily; Therapy: 81LMZ1258 to ([phone])  Requested for: 28Oct2017; Last   VK:97PBO1585 Ordered   7  Ezetimibe 10 MG Oral Tablet; Take 1 tablet by mouth  daily; Therapy: 39Gsh7391 to (Evaluate:92Wbi4983)  Requested for: 25Fyc8800; Last   Rx:28Bvk5310 Ordered   8  Gabapentin 300 MG Oral Capsule; TAKE 2 CAPSULES AT NIGHT; Therapy: 42WQI7253 to [phone])  Requested for: 02DTG1603; Last   Rx:15Ihu5586 Ordered   9  Losartan Potassium 100 MG Oral Tablet; take 1 tablet by mouth once daily; Therapy: 20YIW7573 to (Griselda Beady)  Requested for: 07FUU5769; Last   Rx:29Mar2017 Ordered   10  Metoprolol Tartrate 100 MG Oral Tablet; TAKE 1 TABLET TWICE DAILY; Therapy: (Recorded:16Env1137) to Recorded   11  ROPINIRole HCl - 0 25 MG Oral Tablet; TAKE ONE TABLET BY MOUTH AT BEDTIME;     Therapy: 13IZX2091 to (Evaluate:53Hgm1274)  Requested for: 69JAX1239; Last    Rx:12Nov2017; Status: ACTIVE - Transmit to Pharmacy - Awaiting Verification Ordered   12  Sotalol HCl - 80 MG Oral Tablet; Take 1 tablet by mouth  twice a day; Therapy: 71DXY7811 to (Merril Felling)  Requested for: 20Nov2017; Last    Rx:19Nov2017 Ordered   13  TraMADol HCl - 50 MG Oral Tablet; TAKE 1 TABLET TWICE DAILY AS NEEDED; Therapy: 68RON9995 to (Evaluate:30Jan2018)  Requested for: 88Udz7254; Last    Rx:29Ntx4134 Ordered   14  Zetia 10 MG Oral Tablet (Ezetimibe); TAKE 1 TABLET DAILY; Therapy: 89IDJ3118 to (Adry Abler)  Requested for: 45RKO3240; Last    Rx:07Nov2016 Ordered   15  Zolpidem Tartrate 10 MG Oral Tablet; take one tablet by mouth at bedtime as needed; Therapy: 30Apr2013 to (Evaluate:90Vqg6549)  Requested for: 89AWG3267; Last    Rx:30Nov2017 Ordered    Allergies    1  ACE Inhibitors   2   Penicillins    Signatures   Electronically signed by : Elli Uribe, ; Dec  6 2017 11:08AM EST                       (Author)

## 2018-01-23 NOTE — MISCELLANEOUS
Message     Recorded as Task   Date: 12/08/2017 03:16 PM, Created By: Eliezer Murphy   Task Name: Miscellaneous   Assigned To: Arturo Schulz   Regarding Patient: Krystin Fagan, Status: Active   CommentJayashree Tenorio - 08 Dec 2017 3:16 PM     TASK CREATED  pt is calling and says she still does not have her gabapentin at the pharmacy  cb# [phone]   Raisa Grey - 08 Dec 2017 3:48 PM     TASK EDITED  I called and confirmed with Jaye Boston at [facility] on file and they have the gabapentin order from 12/6 and filled it but pt hasn't picked it up  I s/w pt and informed her of the same  She apologized, she said she sometimes also uses [facility] and knew they didn't have it  She will p/u at her [facility]  Pt very appreciative  Active Problems    1  Allergic rhinitis (477 9) (J30 9)   2  Atrial fibrillation (427 31) (I48 91)   3  Essential hypertension (401 9) (I10)   4  Fibromyalgia (729 1) (M79 7)   5  Greater trochanteric bursitis of right hip (726 5) (M70 61)   6  Hip bursitis, left (726 5) (M70 72)   7  Hip bursitis, right (726 5) (M70 71)   8  Hyperlipidemia (272 4) (E78 5)   9  Iliotibial band syndrome (728 89) (M76 30)   10  Iliotibial band syndrome of both sides (728 89) (M76 31,M76 32)   11  Insomnia (780 52) (G47 00)   12  Low back pain (724 2) (M54 5)   13  Lumbar spondylosis (721 3) (M47 816)   14  Lumbar stenosis (724 02) (M48 061)   15  Lumbosacral radiculopathy (724 4) (M54 17)   16  Need for prophylactic vaccination and inoculation against influenza (V04 81) (Z23)   17  Osteoarthritis Of Hand (715 94)   18  Osteoarthritis of knee (715 36) (M17 10)   19  Osteopenia (733 90) (M85 80)   20  Paroxysmal atrial fibrillation (427 31) (I48 0)   21  Peripheral neuropathy (356 9) (G62 9)   22  Piriformis syndrome, right (355 0) (G57 01)   23  Restless legs syndrome (333 94) (G25 81)   24  Right lumbar radiculopathy (724 4) (M54 16)   25  Sacroiliac pain (724 6) (M53 3)   26   Sacroiliitis (720 2) (M46 1)   27  TMJ syndrome (524 69) (M26 629)   28  URI (upper respiratory infection) (465 9) (J06 9)   29  Vitamin D deficiency (268 9) (E55 9)    Current Meds   1  AmLODIPine Besylate 2 5 MG Oral Tablet; take one tablet by mouth one time daily; Therapy: 96PVN2447 to (Evaluate:67Rxr4199)  Requested for: 56TBX9591; Last   Rx:13Nov2017 Ordered   2  B Complex + C TR TBCR; Therapy: (Recorded:85Axb5719) to Recorded   3  Black Cohosh 20 MG Oral Tablet; Therapy: (Recorded:19Ccc3552) to Recorded   4  Chromium Picolinate Powder; Therapy: (Recorded:22Xso7800) to Recorded   5  CVS Vitamin D 2000 UNIT CAPS; Therapy: (Recorded:72Xta4286) to Recorded   6  Eliquis 5 MG Oral Tablet; Take 1 tablet twice daily; Therapy: 04WWX8786 to [phone])  Requested for: 28Oct2017; Last   NM:05TGI5221 Ordered   7  Ezetimibe 10 MG Oral Tablet; Take 1 tablet by mouth  daily; Therapy: 52Lmc6218 to (Evaluate:53Jar9384)  Requested for: 11Zux1333; Last   Rx:89Tlf8679 Ordered   8  Gabapentin 300 MG Oral Capsule; TAKE 2 CAPSULES AT NIGHT; Therapy: 55ZQE2055 to [phone])  Requested for: 69HNS4874; Last   Rx:38Ckl9835 Ordered   9  Losartan Potassium 100 MG Oral Tablet; take 1 tablet by mouth once daily; Therapy: 83IIK7395 to (Cozetta Pancoast)  Requested for: 60OAO8326; Last   Rx:29Mar2017 Ordered   10  Metoprolol Tartrate 100 MG Oral Tablet; TAKE 1 TABLET TWICE DAILY; Therapy: (Recorded:48Dee2018) to Recorded   11  ROPINIRole HCl - 0 25 MG Oral Tablet; TAKE ONE TABLET BY MOUTH AT BEDTIME; Therapy: 61YPF8053 to (Evaluate:78Vxo0197)  Requested for: 82EMT1618; Last    Rx:12Nov2017; Status: ACTIVE - Transmit to [facility] Ordered   12  Sotalol HCl - 80 MG Oral Tablet; Take 1 tablet by mouth  twice a day; Therapy: 15SFM7806 to (Harry Right)  Requested for: 20Nov2017; Last    Rx:19Nov2017 Ordered   13   TraMADol HCl - 50 MG Oral Tablet; TAKE 1 TABLET TWICE DAILY AS NEEDED; Therapy: 54XVR1251 to (Evaluate:30Jan2018)  Requested for: 15Oty0527; Last    Rx:41Rlw6147 Ordered   14  Zetia 10 MG Oral Tablet (Ezetimibe); TAKE 1 TABLET DAILY; Therapy: 48NOJ8469 to (Rella Risa)  Requested for: 33ZAM4604; Last    Rx:07Nov2016 Ordered   15  Zolpidem Tartrate 10 MG Oral Tablet; take one tablet by mouth at bedtime as needed; Therapy: 30Apr2013 to (Evaluate:51Xdk5192)  Requested for: 24QPE2740; Last    Rx:30Nov2017 Ordered    Allergies    1  ACE Inhibitors   2   Penicillins    Signatures   Electronically signed by : Delmar Morejon, ; Dec  8 2017  3:48PM EST                       (Author)

## 2018-01-24 VITALS
DIASTOLIC BLOOD PRESSURE: 60 MMHG | HEIGHT: 62 IN | BODY MASS INDEX: 28.34 KG/M2 | TEMPERATURE: 97.7 F | WEIGHT: 154 LBS | SYSTOLIC BLOOD PRESSURE: 134 MMHG | RESPIRATION RATE: 16 BRPM | HEART RATE: 60 BPM

## 2018-01-29 ENCOUNTER — TELEPHONE (OUTPATIENT)
Dept: FAMILY MEDICINE CLINIC | Facility: CLINIC | Age: 73
End: 2018-01-29

## 2018-01-29 ENCOUNTER — LAB (OUTPATIENT)
Dept: LAB | Facility: HOSPITAL | Age: 73
End: 2018-01-29
Payer: MEDICARE

## 2018-01-29 DIAGNOSIS — Z12.11 ENCOUNTER FOR SCREENING FOR MALIGNANT NEOPLASM OF COLON: ICD-10-CM

## 2018-01-29 LAB — HEMOCCULT STL QL IA: POSITIVE

## 2018-01-29 PROCEDURE — G0328 FECAL BLOOD SCRN IMMUNOASSAY: HCPCS

## 2018-01-30 ENCOUNTER — TELEPHONE (OUTPATIENT)
Dept: CARDIOLOGY CLINIC | Facility: CLINIC | Age: 73
End: 2018-01-30

## 2018-01-30 DIAGNOSIS — I48.0 PAROXYSMAL ATRIAL FIBRILLATION (HCC): Primary | ICD-10-CM

## 2018-01-30 DIAGNOSIS — I48.0 PAF (PAROXYSMAL ATRIAL FIBRILLATION) (HCC): Primary | ICD-10-CM

## 2018-01-30 RX ORDER — SOTALOL HYDROCHLORIDE 80 MG/1
TABLET ORAL
Qty: 180 TABLET | Refills: 3 | Status: SHIPPED | OUTPATIENT
Start: 2018-01-30 | End: 2018-09-17 | Stop reason: HOSPADM

## 2018-02-20 DIAGNOSIS — G47.01 INSOMNIA DUE TO MEDICAL CONDITION: Primary | ICD-10-CM

## 2018-02-20 RX ORDER — ZOLPIDEM TARTRATE 10 MG/1
TABLET ORAL
Qty: 23 TABLET | Refills: 1 | Status: SHIPPED | OUTPATIENT
Start: 2018-02-20 | End: 2018-04-11 | Stop reason: SDUPTHER

## 2018-03-09 ENCOUNTER — OFFICE VISIT (OUTPATIENT)
Dept: OBGYN CLINIC | Facility: MEDICAL CENTER | Age: 73
End: 2018-03-09
Payer: MEDICARE

## 2018-03-09 VITALS
DIASTOLIC BLOOD PRESSURE: 76 MMHG | HEIGHT: 62 IN | WEIGHT: 155 LBS | BODY MASS INDEX: 28.52 KG/M2 | SYSTOLIC BLOOD PRESSURE: 125 MMHG | HEART RATE: 56 BPM

## 2018-03-09 DIAGNOSIS — M25.552 LEFT HIP PAIN: ICD-10-CM

## 2018-03-09 DIAGNOSIS — G89.29 CHRONIC PAIN OF RIGHT KNEE: Primary | ICD-10-CM

## 2018-03-09 DIAGNOSIS — M79.645 THUMB PAIN, LEFT: ICD-10-CM

## 2018-03-09 DIAGNOSIS — M70.62 TROCHANTERIC BURSITIS OF LEFT HIP: ICD-10-CM

## 2018-03-09 DIAGNOSIS — M25.561 CHRONIC PAIN OF RIGHT KNEE: Primary | ICD-10-CM

## 2018-03-09 DIAGNOSIS — M17.11 PRIMARY OSTEOARTHRITIS OF RIGHT KNEE: ICD-10-CM

## 2018-03-09 PROCEDURE — 99214 OFFICE O/P EST MOD 30 MIN: CPT | Performed by: ORTHOPAEDIC SURGERY

## 2018-03-09 PROCEDURE — 20605 DRAIN/INJ JOINT/BURSA W/O US: CPT | Performed by: ORTHOPAEDIC SURGERY

## 2018-03-09 PROCEDURE — 20610 DRAIN/INJ JOINT/BURSA W/O US: CPT | Performed by: ORTHOPAEDIC SURGERY

## 2018-03-09 RX ORDER — BETAMETHASONE SODIUM PHOSPHATE AND BETAMETHASONE ACETATE 3; 3 MG/ML; MG/ML
6 INJECTION, SUSPENSION INTRA-ARTICULAR; INTRALESIONAL; INTRAMUSCULAR; SOFT TISSUE
Status: COMPLETED | OUTPATIENT
Start: 2018-03-09 | End: 2018-03-09

## 2018-03-09 RX ORDER — LIDOCAINE HYDROCHLORIDE 10 MG/ML
2 INJECTION, SOLUTION INFILTRATION; PERINEURAL
Status: COMPLETED | OUTPATIENT
Start: 2018-03-09 | End: 2018-03-09

## 2018-03-09 RX ORDER — BETAMETHASONE SODIUM PHOSPHATE AND BETAMETHASONE ACETATE 3; 3 MG/ML; MG/ML
12 INJECTION, SUSPENSION INTRA-ARTICULAR; INTRALESIONAL; INTRAMUSCULAR; SOFT TISSUE
Status: COMPLETED | OUTPATIENT
Start: 2018-03-09 | End: 2018-03-09

## 2018-03-09 RX ORDER — BUPIVACAINE HYDROCHLORIDE 2.5 MG/ML
0.5 INJECTION, SOLUTION INFILTRATION; PERINEURAL
Status: COMPLETED | OUTPATIENT
Start: 2018-03-09 | End: 2018-03-09

## 2018-03-09 RX ORDER — BUPIVACAINE HYDROCHLORIDE 2.5 MG/ML
2 INJECTION, SOLUTION INFILTRATION; PERINEURAL
Status: COMPLETED | OUTPATIENT
Start: 2018-03-09 | End: 2018-03-09

## 2018-03-09 RX ORDER — AMLODIPINE BESYLATE 2.5 MG/1
TABLET ORAL
COMMUNITY
Start: 2018-02-11 | End: 2018-04-02 | Stop reason: SDUPTHER

## 2018-03-09 RX ADMIN — LIDOCAINE HYDROCHLORIDE 2 ML: 10 INJECTION, SOLUTION INFILTRATION; PERINEURAL at 10:48

## 2018-03-09 RX ADMIN — BUPIVACAINE HYDROCHLORIDE 2 ML: 2.5 INJECTION, SOLUTION INFILTRATION; PERINEURAL at 10:51

## 2018-03-09 RX ADMIN — LIDOCAINE HYDROCHLORIDE 2 ML: 10 INJECTION, SOLUTION INFILTRATION; PERINEURAL at 10:51

## 2018-03-09 RX ADMIN — BUPIVACAINE HYDROCHLORIDE 0.5 ML: 2.5 INJECTION, SOLUTION INFILTRATION; PERINEURAL at 10:50

## 2018-03-09 RX ADMIN — BUPIVACAINE HYDROCHLORIDE 2 ML: 2.5 INJECTION, SOLUTION INFILTRATION; PERINEURAL at 10:48

## 2018-03-09 RX ADMIN — BETAMETHASONE SODIUM PHOSPHATE AND BETAMETHASONE ACETATE 6 MG: 3; 3 INJECTION, SUSPENSION INTRA-ARTICULAR; INTRALESIONAL; INTRAMUSCULAR; SOFT TISSUE at 10:50

## 2018-03-09 RX ADMIN — BETAMETHASONE SODIUM PHOSPHATE AND BETAMETHASONE ACETATE 12 MG: 3; 3 INJECTION, SUSPENSION INTRA-ARTICULAR; INTRALESIONAL; INTRAMUSCULAR; SOFT TISSUE at 10:48

## 2018-03-09 RX ADMIN — BETAMETHASONE SODIUM PHOSPHATE AND BETAMETHASONE ACETATE 12 MG: 3; 3 INJECTION, SUSPENSION INTRA-ARTICULAR; INTRALESIONAL; INTRAMUSCULAR; SOFT TISSUE at 10:51

## 2018-03-09 NOTE — PATIENT INSTRUCTIONS
You have received multiple injections today    Should there be pain later this evening, liberal use of ice packs may be beneficial

## 2018-03-09 NOTE — PROGRESS NOTES
67 y o female presents to the office for right knee pain  She has been treating her right knee conservatively with injections  She has had 2 previous injections which were beneficial in relieving her symptoms  She has a history of left TKA  She is also having left hip pain today in the office  She has previously had a trochanteric bursa injection which was beneficial  She is experiencing pain in her left wrist     Review of Systems  Review of systems negative unless otherwise specified in HPI    Past Medical History  Past Medical History:   Diagnosis Date    Arthritis     Hypertension        Past Surgical History  Past Surgical History:   Procedure Laterality Date    EYE SURGERY      HYSTERECTOMY      JOINT REPLACEMENT         Current Medications  Current Outpatient Prescriptions on File Prior to Visit   Medication Sig Dispense Refill    apixaban (ELIQUIS) 5 mg Take 1 tablet (5 mg total) by mouth 2 (two) times a day 56 tablet 0    B Complex-C-Folic Acid (B COMPLEX + C TR) TBCR Take by mouth daily   Black Cohosh 20 MG TABS Take 20 mg by mouth daily   Cholecalciferol (CVS VITAMIN D) 2000 UNITS CAPS Take 2,000 Units by mouth daily   Chromium Picolinate POWD Take by mouth daily   ezetimibe (ZETIA) 10 mg tablet Zetia 10 MG Oral Tablet TAKE 1 TABLET DAILY  Quantity: 90;  Refills: 3    Sina PATRICIA MD;  Rosio Connolly 16-Oct-2012 Active      gabapentin (NEURONTIN) 300 mg capsule Take 200 mg by mouth daily at bedtime   Gabapentin 300 MG Oral Capsule TAKE ONE CAPSULE BY MOUTH THREE TIMES DAILY  Quantity: 90;  Refills: 1    Logan Montoya MD;  Started 13-Jan-2016 Active       losartan (COZAAR) 100 MG tablet Losartan Potassium 100 MG Oral Tablet take 1 tablet by mouth once daily  Quantity: 90;  Refills: 3    Sina PATRICIA MD;  Rosio Connolly 16-Oct-2011 Active      rOPINIRole (REQUIP) 0 25 mg tablet ROPINIRole HCl - 0 25 MG Oral Tablet TAKE ONE TABLET BY MOUTH AT BEDTIME  Quantity: 30;  Refills: 5 Radha PATRICIA MD;  Daisy Stack 5-Jan-2016 Active      sotalol (BETAPACE) 80 mg tablet TAKE 1 TABLET BY MOUTH  TWICE A  tablet 3    traMADol (ULTRAM) 50 mg tablet TraMADol HCl - 50 MG Oral Tablet TAKE 1 TABLET TWICE DAILY AS NEEDED  Quantity: 180;  Refills: 1    Radha PATRICIA MD;  Daisy Stack 29-May-2013 Active      zolpidem (AMBIEN) 10 mg tablet TAKE ONE TABLET BY MOUTH NIGHTLY AT BEDTIME AS NEEDED  23 tablet 1     No current facility-administered medications on file prior to visit          Recent Labs [facility])    0  Lab Value Date/Time   HCT 40 2 10/15/2016 1425   HCT 39 3 05/06/2015 1116   HGB 13 7 10/15/2016 1425   HGB 13 3 05/06/2015 1116   WBC 5 70 10/15/2016 1425   WBC 6 63 05/06/2015 1116   GLUCOSE 131 10/15/2016 1425   GLUCOSE 114 05/06/2015 1116   HGBA1C 5 9 (H) 04/21/2016 0438         Physical exam  · General: Awake, Alert, Oriented  · Eyes: Pupils equal, round and reactive to light  · Heart: regular rate and rhythm  · Lungs: No audible wheezing  · Abdomen: soft  right Knee exam  · Patient ambulates without assistance  · [place] legged alignment  · No effusion right knee  · Bony enlargement right knee  · Tender to palpation left trochanteric bursa  · Left wrist bony enlargement      Imaging  None    Procedure  Large joint arthrocentesis  Date/Time: 3/9/2018 10:48 AM  Consent given by: patient  Timeout: Immediately prior to procedure a time out was called to verify the correct patient, procedure, equipment, support staff and site/side marked as required   Supporting Documentation  Indications: pain   Procedure Details  Location: knee - R knee  Preparation: Patient was prepped and draped in the usual sterile fashion  Needle size: 22 G  Ultrasound guidance: no  Approach: anterolateral  Medications administered: 2 mL bupivacaine 0 25 %; 2 mL lidocaine 1 %; 12 mg betamethasone acetate-betamethasone sodium phosphate 6 (3-3) mg/mL    Patient tolerance: patient tolerated the procedure well with no immediate complications  Dressing:  Sterile dressing applied  Medium joint arthrocentesis  Date/Time: 3/9/2018 10:50 AM  Consent given by: patient  Site marked: site marked  Timeout: Immediately prior to procedure a time out was called to verify the correct patient, procedure, equipment, support staff and site/side marked as required   Supporting Documentation  Indications: pain   Procedure Details  Location: wrist - Wrist joint: Left  Needle size: 25 G  Ultrasound guidance: no  Approach: dorsal  Medications administered: 0 5 mL bupivacaine 0 25 %; 6 mg betamethasone acetate-betamethasone sodium phosphate 6 (3-3) mg/mL    Patient tolerance: patient tolerated the procedure well with no immediate complications  Dressing:  Sterile dressing applied  Large joint arthrocentesis  Date/Time: 3/9/2018 10:51 AM  Consent given by: patient  Site marked: site marked  Timeout: Immediately prior to procedure a time out was called to verify the correct patient, procedure, equipment, support staff and site/side marked as required   Supporting Documentation  Indications: pain   Procedure Details  Location: hip - L greater trochanteric bursa  Preparation: Patient was prepped and draped in the usual sterile fashion  Needle size: 22 G  Ultrasound guidance: no  Approach: lateral  Medications administered: 2 mL bupivacaine 0 25 %; 2 mL lidocaine 1 %; 12 mg betamethasone acetate-betamethasone sodium phosphate 6 (3-3) mg/mL    Patient tolerance: patient tolerated the procedure well with no immediate complications  Dressing:  Sterile dressing applied          Assessment/Plan:   67 y  o female received injections in her right knee, left hip and left wrist  We will see her back in 10 weeks

## 2018-03-27 ENCOUNTER — TELEPHONE (OUTPATIENT)
Dept: CARDIOLOGY CLINIC | Facility: CLINIC | Age: 73
End: 2018-03-27

## 2018-03-27 DIAGNOSIS — I48.0 PAROXYSMAL ATRIAL FIBRILLATION (HCC): ICD-10-CM

## 2018-03-27 NOTE — TELEPHONE ENCOUNTER
Rafi Blackwell left a message, stating she needs Eliquis 5 mg samples and would like to  at the [facility] office tomorrow    Farnaz's cb: [phone]

## 2018-03-28 ENCOUNTER — OFFICE VISIT (OUTPATIENT)
Dept: FAMILY MEDICINE CLINIC | Facility: CLINIC | Age: 73
End: 2018-03-28
Payer: MEDICARE

## 2018-03-28 VITALS
HEART RATE: 62 BPM | BODY MASS INDEX: 28.89 KG/M2 | TEMPERATURE: 98 F | RESPIRATION RATE: 16 BRPM | SYSTOLIC BLOOD PRESSURE: 132 MMHG | HEIGHT: 62 IN | WEIGHT: 157 LBS | DIASTOLIC BLOOD PRESSURE: 74 MMHG

## 2018-03-28 DIAGNOSIS — M47.812 SPONDYLOSIS OF CERVICAL REGION WITHOUT MYELOPATHY OR RADICULOPATHY: ICD-10-CM

## 2018-03-28 DIAGNOSIS — M79.18 MYOFASCIAL PAIN SYNDROME: Primary | ICD-10-CM

## 2018-03-28 PROCEDURE — 99213 OFFICE O/P EST LOW 20 MIN: CPT | Performed by: FAMILY MEDICINE

## 2018-03-28 PROCEDURE — 20552 NJX 1/MLT TRIGGER POINT 1/2: CPT | Performed by: FAMILY MEDICINE

## 2018-03-28 NOTE — PROGRESS NOTES
Assessment/Plan:     Diagnoses and all orders for this visit:    Myofascial pain syndrome  -     Ambulatory referral to Physical Therapy; Future    Spondylosis of cervical region without myelopathy or radiculopathy  -     Ambulatory referral to Physical Therapy; Future          Continue with symptom treatment  Prescription for physical therapy  As a separate procedure today I performed a series of trigger point injection see procedure note  Recheck as needed call if any changes     Patient ID: Steve Arias is a 67 y o  female  Longstanding history of recurrent left-sided neck pain with increased pain symptoms over the last month  No left arm pain, weakness or numbness  She has been using Tylenol arthritis as needed  Previous imaging studies  No prior treatments such as PT chiropractic or massage  Current medications reviewed        The following portions of the patient's history were reviewed and updated as appropriate: current medications, past family history, past medical history, past social history, past surgical history and problem list     Review of Systems   Constitutional: Negative for chills, fever and unexpected weight change  Respiratory: Negative for cough and wheezing  Cardiovascular: Negative for chest pain and palpitations  Gastrointestinal:         Appetite normal   Skin: Negative for rash  Neurological: Negative for dizziness and headaches  Hematological: Negative for adenopathy  Objective:      /74 (BP Location: Left arm, Patient Position: Sitting, Cuff Size: Large)   Pulse 62   Temp 98 °F (36 7 °C)   Resp 16   Ht 5' 2" (1 575 m)   Wt 71 2 kg (157 lb)   BMI 28 72 kg/m²          Physical Exam   HENT:   Right Ear: Tympanic membrane normal    Left Ear: Tympanic membrane normal    Mouth/Throat: Oropharynx is clear and moist and mucous membranes are normal  Oral lesions present  Normal dentition  Eyes: Conjunctivae are normal  No scleral icterus     Neck: No thyroid mass and no thyromegaly present  Cardiovascular:   Pulses:       Carotid pulses are 2+ on the right side, and 2+ on the left side  No carotid bruits   Musculoskeletal:   mild restriction with left neck rotation  Flexion extension are normal   Negative Spurling test    Lymphadenopathy:     She has no cervical adenopathy  Neurological: She has normal reflexes  Strength in upper extremities normal   Normal hand   Negative Reid's sign  Skin: No rash noted  Nursing note and vitals reviewed  Procedure note:    Trigger point injections cervical/upper back  Indication Myofascial pain syndrome  3 separate trigger points injected  Left suboccipital, posterior cervical and trapezius areas  Size of needle 27 G  Total volume Sarapin 4 ml/Lidocaine 1% 2 ml      patient tolerated procedure    No complications

## 2018-04-02 DIAGNOSIS — I10 HYPERTENSION, UNSPECIFIED TYPE: Primary | ICD-10-CM

## 2018-04-03 ENCOUNTER — LAB REQUISITION (OUTPATIENT)
Dept: LAB | Facility: HOSPITAL | Age: 73
End: 2018-04-03
Payer: MEDICARE

## 2018-04-03 DIAGNOSIS — K57.30 DIVERTICULOSIS OF LARGE INTESTINE WITHOUT PERFORATION OR ABSCESS WITHOUT BLEEDING: ICD-10-CM

## 2018-04-03 DIAGNOSIS — D12.4 BENIGN NEOPLASM OF DESCENDING COLON: ICD-10-CM

## 2018-04-03 DIAGNOSIS — Z86.010 HISTORY OF COLONIC POLYPS: ICD-10-CM

## 2018-04-03 DIAGNOSIS — D12.3 BENIGN NEOPLASM OF TRANSVERSE COLON: ICD-10-CM

## 2018-04-03 PROCEDURE — 88305 TISSUE EXAM BY PATHOLOGIST: CPT | Performed by: PATHOLOGY

## 2018-04-03 RX ORDER — AMLODIPINE BESYLATE 2.5 MG/1
2.5 TABLET ORAL DAILY
Qty: 90 TABLET | Refills: 5 | Status: SHIPPED | OUTPATIENT
Start: 2018-04-03 | End: 2018-04-04 | Stop reason: SDUPTHER

## 2018-04-04 DIAGNOSIS — I10 HYPERTENSION, UNSPECIFIED TYPE: ICD-10-CM

## 2018-04-04 RX ORDER — AMLODIPINE BESYLATE 2.5 MG/1
2.5 TABLET ORAL DAILY
Qty: 90 TABLET | Refills: 3 | Status: SHIPPED | OUTPATIENT
Start: 2018-04-04 | End: 2018-04-06 | Stop reason: SDUPTHER

## 2018-04-04 NOTE — TELEPHONE ENCOUNTER
Pt called for samples of eliquis  Pt has never picked up order in the pharmacy due to cost  I gave patient phone number to [employer] patient assistance program to call today  Pt given until approved for program 4 packs of eliquis samples LOT # [id number] EXP: march 2020  Pt received samples on 03/27/2018 pending medication sample approval to close encounter

## 2018-04-06 DIAGNOSIS — I10 HYPERTENSION, UNSPECIFIED TYPE: ICD-10-CM

## 2018-04-06 RX ORDER — AMLODIPINE BESYLATE 2.5 MG/1
2.5 TABLET ORAL DAILY
Qty: 90 TABLET | Refills: 0 | Status: SHIPPED | OUTPATIENT
Start: 2018-04-06 | End: 2018-05-07 | Stop reason: SDUPTHER

## 2018-04-11 ENCOUNTER — EVALUATION (OUTPATIENT)
Dept: PHYSICAL THERAPY | Facility: MEDICAL CENTER | Age: 73
End: 2018-04-11
Payer: MEDICARE

## 2018-04-11 DIAGNOSIS — G25.81 RLS (RESTLESS LEGS SYNDROME): Primary | ICD-10-CM

## 2018-04-11 DIAGNOSIS — M79.18 MYOFASCIAL PAIN SYNDROME: ICD-10-CM

## 2018-04-11 DIAGNOSIS — M47.812 SPONDYLOSIS OF CERVICAL REGION WITHOUT MYELOPATHY OR RADICULOPATHY: ICD-10-CM

## 2018-04-11 DIAGNOSIS — G47.01 INSOMNIA DUE TO MEDICAL CONDITION: ICD-10-CM

## 2018-04-11 PROCEDURE — 97162 PT EVAL MOD COMPLEX 30 MIN: CPT | Performed by: PHYSICAL THERAPIST

## 2018-04-11 PROCEDURE — G8984 CARRY CURRENT STATUS: HCPCS | Performed by: PHYSICAL THERAPIST

## 2018-04-11 PROCEDURE — 97140 MANUAL THERAPY 1/> REGIONS: CPT | Performed by: PHYSICAL THERAPIST

## 2018-04-11 PROCEDURE — G8985 CARRY GOAL STATUS: HCPCS | Performed by: PHYSICAL THERAPIST

## 2018-04-11 RX ORDER — ROPINIROLE 0.25 MG/1
TABLET, FILM COATED ORAL
Qty: 30 TABLET | Refills: 5 | Status: SHIPPED | OUTPATIENT
Start: 2018-04-11 | End: 2018-11-28 | Stop reason: SDUPTHER

## 2018-04-11 RX ORDER — ZOLPIDEM TARTRATE 10 MG/1
TABLET ORAL
Qty: 23 TABLET | Refills: 3 | Status: SHIPPED | OUTPATIENT
Start: 2018-04-11 | End: 2018-07-01 | Stop reason: SDUPTHER

## 2018-04-11 NOTE — PROGRESS NOTES
PT Evaluation     Today's date: 2018  Patient name: Filipe Kim  : 1945  MRN: [id number]  Referring provider: Wandy Schulz MD  Dx:   Encounter Diagnosis     ICD-10-CM    1  Myofascial pain syndrome M79 1 Ambulatory referral to Physical Therapy   2  Spondylosis of cervical region without myelopathy or radiculopathy M47 812 Ambulatory referral to Physical Therapy                  Assessment  Impairments: abnormal gait, abnormal muscle firing, abnormal muscle tone, abnormal or restricted ROM, abnormal movement, activity intolerance, impaired physical strength, lacks appropriate home exercise program and pain with function  Other impairment: postural dysfunction    Assessment details: Filipe Kim is a 67 y o  female who presents with pain, decreased ROM and postural  dysfunction  Due to these impairments, Patient has difficulty performing a/iadls and recreational activities  Patient's clinical presentation is consistent with their referring diagnosis of Myofascial pain syndrome, and Spondylosis of cervical region without myelopathy or radiculopathy  Patient would benefit from skilled physical therapy to address their aforementioned impairments, improve their level of function and to improve their overall quality of life  Understanding of Dx/Px/POC: excellent   Prognosis: good  Prognosis details: (+) prognostic factors- positive attitude towards recovery and positive response to PT in the past  (-) prognostic factors- PMH of HTN, arrhythmia, and length of neck pain    Goals  Short Term Goals: to be achieved by 4 weeks  1) Patient to be independent with basic HEP  2) Decrease pain to 4/10 at its worst   3) Increase cervical spine ROM by 10% in all deficient planes  4) Patient to report decreased sleep interruption secondary to pain  5) Increase seated to 20 min  Long Term Goals: to be achieved by discharge  1) FOTO equal to or greater than 72    2) Patient to be independent with comprehensive HEP  3) Abolish pain for improved quality of life  4) Cervical spine ROM WNL all planes to improve a/iadls  5) Patient to report no sleep interruption secondary to pain  6) Increase seated to 60 min  Plan  Patient would benefit from: skilled PT  Planned modality interventions: biofeedback, cryotherapy, TENS, thermotherapy: hydrocollator packs and unattended electrical stimulation  Planned therapy interventions: abdominal trunk stabilization, activity modification, ADL retraining, ADL training, behavior modification, body mechanics training, breathing training, functional ROM exercises, home exercise program, IADL retraining, joint mobilization, manual therapy, massage, motor coordination training, neuromuscular re-education, patient education, postural training, self care, strengthening, stretching, therapeutic activities, therapeutic exercise and transfer training  Frequency: 2x week (2-3x week)  Duration in weeks: 8  Treatment plan discussed with: patient        Subjective Evaluation    History of Present Illness  Mechanism of injury: Pt notes that approximately 2-3 months ago her neck began to bother her again  She notes that her pain is localized to the left side of her neck, and she denies any tingling or numbness  She does not occasional sleep disruption  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  She did receive a trigger point injection which she reports helped for a bit, but the pain is beginning to re turn  Finally, she notes that she did receive acupuncture for this a few years ago with good relief  Recurrent probem    Quality of life: good    Pain  Current pain ratin  At best pain ratin  At worst pain ratin  Location: L UT, Levator   Quality: soreness      Hand dominance: right    Treatments  Previous treatment: injection treatment (accupunture )  Patient Goals  Patient goal: Pt states that her goal for PT is to get "rid of it all "        Objective     Special Questions  Negative for dizziness, headaches, nausea/motion sickness, tinnitus, trouble swallowing and visual change    Postural Observations  Seated posture: fair  Standing posture: fair        Palpation   Left   Muscle spasm in the levator scapulae and upper trapezius  Tenderness of the levator scapulae, suboccipitals and upper trapezius  Trigger point to levator scapulae and upper trapezius  Neurological Testing     Sensation   Cervical/Thoracic   Left   Intact: pin prick    Right   Intact: pin prick    Reflexes   Left   Biceps (C5/C6): normal (2+)  Brachioradialis (C6): normal (2+)  Triceps (C7): normal (2+)    Right   Biceps (C5/C6): normal (2+)  Brachioradialis (C6): normal (2+)  Triceps (C7): normal (2+)    Active Range of Motion   Cervical/Thoracic Spine   Cervical    Flexion: Neck active flexion: 25%, "pulling"   Extension: Neck active extension: 25%, no pain  Left lateral flexion: Neck active lateral bend left: 50% with pain  Right lateral flexion: Neck active lateral bend right: 50% with pain  Left rotation: Neck active rotation left: 50% with pain  Right rotation: [facility]    Joint Play Hypomobile: C4, C5, C6 and C7     Strength/Myotome Testing   Cervical Spine     Left   Normal strength    Right etr  Normal strength    Tests   Cervical     Left   Positive cervical distraction and Spurling's sign  Right   Negative cervical distraction and Spurling's sign         Flowsheet Rows    Flowsheet Row Most Recent Value   PT/OT G-Codes   Current Score  71   Projected Score  70   FOTO information reviewed  Yes   Assessment Type  Evaluation   G code set  Carrying, Moving & Handling Objects   Carrying, Moving and Handling Objects Current Status ()  CJ   Carrying, Moving and Handling Objects Goal Status ()  CI          Precautions: HTN, arrhythmia, GERD    Daily Treatment Diary     Manual  4/11            C/s distraction 8'            SOR 2' Exercise Diary  4/11            UBE NV            Levator stretch 10" x5            UT stretch 10" x5            scap retraction 10x            Ant scalene stretch NV            C/s retraction NV            shld rows NV            shld ext NV                                                                                                                                                                            Modalities              MHP PRN

## 2018-04-18 ENCOUNTER — OFFICE VISIT (OUTPATIENT)
Dept: CARDIOLOGY CLINIC | Facility: CLINIC | Age: 73
End: 2018-04-18
Payer: MEDICARE

## 2018-04-18 VITALS
OXYGEN SATURATION: 94 % | WEIGHT: 159.3 LBS | HEART RATE: 56 BPM | HEIGHT: 62 IN | SYSTOLIC BLOOD PRESSURE: 122 MMHG | DIASTOLIC BLOOD PRESSURE: 66 MMHG | BODY MASS INDEX: 29.32 KG/M2

## 2018-04-18 DIAGNOSIS — I48.91 ATRIAL FIBRILLATION WITH RAPID VENTRICULAR RESPONSE (HCC): ICD-10-CM

## 2018-04-18 DIAGNOSIS — E78.5 DYSLIPIDEMIA: ICD-10-CM

## 2018-04-18 DIAGNOSIS — I10 ESSENTIAL HYPERTENSION: ICD-10-CM

## 2018-04-18 DIAGNOSIS — I48.91 ATRIAL FIBRILLATION, UNSPECIFIED TYPE (HCC): Primary | ICD-10-CM

## 2018-04-18 DIAGNOSIS — R00.2 PALPITATIONS: ICD-10-CM

## 2018-04-18 PROCEDURE — 99214 OFFICE O/P EST MOD 30 MIN: CPT | Performed by: INTERNAL MEDICINE

## 2018-04-18 PROCEDURE — 93000 ELECTROCARDIOGRAM COMPLETE: CPT | Performed by: INTERNAL MEDICINE

## 2018-04-18 NOTE — PROGRESS NOTES
Cardiology Follow Up    Mandy Ocampo  1945  [id number]  [facility] [address]  [place] [address] [address]    1  Atrial fibrillation, unspecified type (HCC)  POCT ECG    CBC and Platelet    Comprehensive metabolic panel    Lipid panel   2  Atrial fibrillation with rapid ventricular response ([address] )     3  Essential hypertension     4  Dyslipidemia     5  Palpitations         Discussion/Summary:  Overall she has been doing quite well since her last office appointment  Atrial fibrillation has been well controlled on sotalol  She is tolerating anticoagulation well with no adverse bleeding events  Blood pressures been controlled  She is due for a fasting lipid profile and some basic blood work  No other cardiac testing at this time  Interval History:   Routine follow-up overall she has been doing well  Palpitations have been quite minimal   She feels sotalol as controlling her atrial fibrillation  Denies any chest pain, shortness of breath, lightheadedness, dizziness, or syncope  There has been no adverse bleeding events on anticoagulation  There has been no lower extremity edema, PND, orthopnea      Problem List     Atrial fibrillation with rapid ventricular response (HCC)    Essential hypertension    Dyslipidemia    Palpitations    Sacroiliitis (HCC)    Chronic pain of right knee    Primary osteoarthritis of right knee    Left hip pain    Trochanteric bursitis of left hip    Thumb pain, left        Past Medical History:   Diagnosis Date    Actinic keratosis     last assessed - [id number]    Arthritis     Atrial fibrillation with rapid ventricular response ([address] )     last assessed - 26Apr2016    Basal cell carcinoma     Benign colon polyp     last assessed - 27Apr2015    Effusion of knee joint right     Resolved - [id number]    Esophageal reflux     Fibromyalgia     last assessed - [id number]   [place] Hypertension     Nummular eczema     last assessed - [id number]    Palpitations     last assessed - [id number]    Peroneal tendonitis, right     last assessed - [id number]    Rectal hemorrhage     last assessed - [id number]    Trochanteric bursitis, unspecified hip     last assessed - [id number]     Social History     Social History    Marital status: /Civil Union     Spouse name: N/A    Number of children: N/A    Years of education: N/A     Occupational History    Not on file  Social History Main Topics    Smoking status: Never Smoker    Smokeless tobacco: Never Used    Alcohol use Yes      Comment: Social alcohol use - per [employer]    Drug use: No    Sexual activity: Not on file     Other Topics Concern    Not on file     Social History Narrative    No narrative on file      Family History   Problem Relation Age of Onset    Heart disease Mother     Heart disease Father     Coronary artery disease Father     Stroke Father      cerebrovascular accident    Heart attack Father      myocardial infarction    Other Family      Back disorder    Coronary artery disease Family     Neuropathy Family     Osteoporosis Family      Past Surgical History:   Procedure Laterality Date    CATARACT EXTRACTION      COLONOSCOPY  03/2018    EYE SURGERY      HYSTERECTOMY      JOINT REPLACEMENT      RECTAL POLYPECTOMY      REPLACEMENT TOTAL KNEE Left     last assessed - [id number]    TONSILLECTOMY      TOTAL ABDOMINAL HYSTERECTOMY      TUBAL LIGATION         Current Outpatient Prescriptions:     amLODIPine (NORVASC) 2 5 mg tablet, Take 1 tablet (2 5 mg total) by mouth daily, Disp: 90 tablet, Rfl: 0    apixaban (ELIQUIS) 5 mg, Take 1 tablet (5 mg total) by mouth 2 (two) times a day, Disp: 56 tablet, Rfl: 0    B Complex-C-Folic Acid (B COMPLEX + C TR) TBCR, Take by mouth daily  , Disp: , Rfl:     Cholecalciferol ([employer]S VITAMIN D) 2000 UNITS CAPS, Take 2,000 Units by mouth daily  , Disp: , Rfl:    Chromium Picolinate POWD, Take by mouth daily  , Disp: , Rfl:     ezetimibe (ZETIA) 10 mg tablet, Zetia 10 MG Oral Tablet TAKE 1 TABLET DAILY  Quantity: 90;  Refills: 3    Carlene PATRICIA MD;  Started 16-Oct-2012 Active, Disp: , Rfl:     gabapentin (NEURONTIN) 300 mg capsule, Take 600 mg by mouth daily at bedtime Gabapentin 300 MG Oral Capsule TAKE ONE CAPSULE BY MOUTH THREE TIMES DAILY  Quantity: 90;  Refills: 1    Dereck Hernandez MD;  Started 13-Jan-2016 Active , Disp: , Rfl:     losartan (COZAAR) 100 MG tablet, Losartan Potassium 100 MG Oral Tablet take 1 tablet by mouth once daily  Quantity: 90;  Refills: 3    Carlene PATRICIA MD;  Started 16-Oct-2011 Active, Disp: , Rfl:     rOPINIRole (REQUIP) 0 25 mg tablet, TAKE ONE TABLET BY MOUTH AT BEDTIME , Disp: 30 tablet, Rfl: 5    sotalol (BETAPACE) 80 mg tablet, TAKE 1 TABLET BY MOUTH  TWICE A DAY, Disp: 180 tablet, Rfl: 3    traMADol (ULTRAM) 50 mg tablet, TraMADol HCl - 50 MG Oral Tablet TAKE 1 TABLET TWICE DAILY AS NEEDED    Quantity: 180;  Refills: 1    Carlene PATRICIA MD;  [facility] 29-May-2013 Active, Disp: , Rfl:     zolpidem (AMBIEN) 10 mg tablet, TAKE ONE TABLET BY MOUTH NIGHTLY at bedtime AS NEEDED FOR SLEEP  , Disp: 23 tablet, Rfl: 3  Allergies   Allergen Reactions    Ace Inhibitors     Penicillins        Labs:     Chemistry        Component Value Date/Time     10/15/2016 1425     05/06/2015 1116    K 3 8 10/15/2016 1425    K 4 8 05/06/2015 1116     10/15/2016 1425    CL 99 (L) 05/06/2015 1116    CO2 28 10/15/2016 1425    CO2 28 05/06/2015 1116    BUN 19 10/15/2016 1425    BUN 19 05/06/2015 1116    CREATININE 0 92 10/15/2016 1425    CREATININE 0 97 05/06/2015 1116        Component Value Date/Time    CALCIUM 9 5 10/15/2016 1425    CALCIUM 9 0 05/06/2015 1116    ALKPHOS 84 04/19/2016 1217    ALKPHOS 60 05/06/2015 1116    AST 19 04/19/2016 1217    AST 28 05/06/2015 1116    ALT 28 04/19/2016 1217    ALT 34 05/06/2015 1116 BILITOT 0 90 04/19/2016 1217    BILITOT 0 78 05/06/2015 1116            Lab Results   Component Value Date    CHOL 172 04/21/2016    CHOL 205 05/06/2015    CHOL 195 07/10/2014     Lab Results   Component Value Date    HDL 50 04/21/2016    HDL 53 05/06/2015    HDL 68 07/10/2014     Lab Results   Component Value Date    LDLCALC 104 (H) 04/21/2016    LDLCALC 134 (H) 05/06/2015    LDLCALC 116 (H) 07/10/2014     Lab Results   Component Value Date    TRIG 89 04/21/2016    TRIG 90 05/06/2015    TRIG 55 07/10/2014     No components found for: CHOLHDL    Imaging: No results found  ECG:  Sinus bradycardia nonspecific T-wave changes      Review of Systems   Constitution: Negative  HENT: Negative  Eyes: Negative  Cardiovascular: Negative  Respiratory: Negative  Endocrine: Negative  Hematologic/Lymphatic: Negative  Skin: Negative  Musculoskeletal: Negative  Gastrointestinal: Negative  Genitourinary: Negative  Neurological: Negative  Psychiatric/Behavioral: Negative  Vitals:    04/18/18 1254   BP: 122/66   Pulse: 56   SpO2: 94%     Vitals:    04/18/18 1254   Weight: 72 3 kg (159 lb 4 8 oz)     Height: 5' 2" (157 5 cm)   Body mass index is 29 14 kg/m²  Physical Exam:  Vital signs reviewed    General appearance:  Appears stated age, alert, well appearing and in no distress  HEENT:  PERRLA, EOMI, no scleral icterus, no conjunctival pallor  NECK:  Supple, No elevated JVP, no thyromegaly, no carotid bruits  HEART:  Regular rate and rhythm, normal S1/S2, no S3/S4, no murmur or rub  LUNGS:  Clear to auscultation bilaterally, no wheezes rales or rhonchi  ABDOMEN:  Soft, non-tender, positive bowel sounds, no rebound or guarding, no organomegaly   EXTREMITIES:  No edema, normal range of motion  VASCULAR:  Normal pedal pulses, good pulse volume   SKIN: No lesions or rashes on exposed skin  NEURO:  CN II-XII intact, no focal deficits

## 2018-04-19 ENCOUNTER — OFFICE VISIT (OUTPATIENT)
Dept: PHYSICAL THERAPY | Facility: MEDICAL CENTER | Age: 73
End: 2018-04-19
Payer: MEDICARE

## 2018-04-19 DIAGNOSIS — M79.18 MYOFASCIAL PAIN SYNDROME: Primary | ICD-10-CM

## 2018-04-19 DIAGNOSIS — M47.812 SPONDYLOSIS OF CERVICAL REGION WITHOUT MYELOPATHY OR RADICULOPATHY: ICD-10-CM

## 2018-04-19 PROCEDURE — 97112 NEUROMUSCULAR REEDUCATION: CPT | Performed by: PHYSICAL THERAPIST

## 2018-04-19 PROCEDURE — 97110 THERAPEUTIC EXERCISES: CPT | Performed by: PHYSICAL THERAPIST

## 2018-04-19 PROCEDURE — 97140 MANUAL THERAPY 1/> REGIONS: CPT | Performed by: PHYSICAL THERAPIST

## 2018-04-19 NOTE — PROGRESS NOTES
Daily Note     Today's date: 2018  Patient name: Demi Dave  : 1945  MRN: [id number]  Referring provider: Charmaine Mark MD  Dx:   Encounter Diagnosis     ICD-10-CM    1  Myofascial pain syndrome M79 1    2  Spondylosis of cervical region without myelopathy or radiculopathy M47 812        Start Time:   Stop Time: 1125  Total time in clinic (min): 40 minutes    Subjective: Pt states that some days it bothers her and others she feels okay  She notes that her pain is worse in the morning, and best with rest        Objective: See treatment diary below      Assessment: Tolerated treatment well  Patient demonstrated fatigue post treatment, exhibited good technique with therapeutic exercises, would benefit from continued PT and demonstrated good technique with perscribed TE/NRE  Progressed HEP with new activiites and will review ith patient NV  Continued to educate regarding posture  Plan: Continue per plan of care  Progress treatment as tolerated        Precautions: HTN, arrhythmia, GERD    Daily Treatment Diary     Manual             C/s distraction 8' 8'           SOR 2' 2'                                                      Exercise Diary             UBE NV 4' retro           Levator stretch 10" x5 10"x5           UT stretch 10" x5 10"x5           scap retraction 10x 15x           Ant scalene stretch NV 10"x5           C/s retraction NV 10x           shld rows NV 10x           shld ext NV 10x           Thoracic ext over chair  10"x10           Low trap lifts  NV                                                                                                                                                 Modalities              MHP PRN

## 2018-04-20 ENCOUNTER — OFFICE VISIT (OUTPATIENT)
Dept: PHYSICAL THERAPY | Facility: MEDICAL CENTER | Age: 73
End: 2018-04-20
Payer: MEDICARE

## 2018-04-20 DIAGNOSIS — M47.812 SPONDYLOSIS OF CERVICAL REGION WITHOUT MYELOPATHY OR RADICULOPATHY: ICD-10-CM

## 2018-04-20 DIAGNOSIS — M79.18 MYOFASCIAL PAIN SYNDROME: Primary | ICD-10-CM

## 2018-04-20 PROCEDURE — 97110 THERAPEUTIC EXERCISES: CPT | Performed by: PHYSICAL THERAPIST

## 2018-04-20 PROCEDURE — 97112 NEUROMUSCULAR REEDUCATION: CPT | Performed by: PHYSICAL THERAPIST

## 2018-04-20 PROCEDURE — 97140 MANUAL THERAPY 1/> REGIONS: CPT | Performed by: PHYSICAL THERAPIST

## 2018-04-20 NOTE — PROGRESS NOTES
Daily Note     Today's date: 2018  Patient name: Kaz Packer  : 1945  MRN: [id number]  Referring provider: Wynona Habermann, MD  Dx:   Encounter Diagnosis     ICD-10-CM    1  Myofascial pain syndrome M79 1    2  Spondylosis of cervical region without myelopathy or radiculopathy M47 812                   Subjective: Pt denied any pain after her treatment session yesterday, but does report that she "felt it" this morning  She was reporting that she was trying to be more mindful of her posture  Pt noted slight discomfort with UBE  Objective: See treatment diary below      Assessment: Tolerated treatment well  Patient demonstrated fatigue post treatment, exhibited good technique with therapeutic exercises and would benefit from continued PT  Pain during UBE most likely due to UT compensation  Will hold this NV and consider MHP pre-treatment instead  Plan: Continue per plan of care  Progress treatment as tolerated        Precautions: HTN, arrhythmia, GERD    Daily Treatment Diary     Manual            C/s distraction 8' 8' 8'          SOR 2' 2' 2'                                                     Exercise Diary            UBE NV 4' retro 4' retro          Levator stretch 10" x5 10"x5 10" x5          UT stretch 10" x5 10"x5 10" x5          scap retraction 10x 15x 15x          Ant scalene stretch NV 10"x5 10" x5          C/s retraction NV 10x 10x          shld rows NV 10x 10x          shld ext NV 10x 10x          Thoracic ext over chair  10"x10 10"x10          Low trap lifts  NV 5", 2x10 RTB                                                                                                                                                Modalities              MHP   10' post tx

## 2018-04-24 ENCOUNTER — OFFICE VISIT (OUTPATIENT)
Dept: PHYSICAL THERAPY | Facility: MEDICAL CENTER | Age: 73
End: 2018-04-24
Payer: MEDICARE

## 2018-04-24 DIAGNOSIS — M79.18 MYOFASCIAL PAIN SYNDROME: Primary | ICD-10-CM

## 2018-04-24 DIAGNOSIS — M47.812 SPONDYLOSIS OF CERVICAL REGION WITHOUT MYELOPATHY OR RADICULOPATHY: ICD-10-CM

## 2018-04-24 PROCEDURE — 97112 NEUROMUSCULAR REEDUCATION: CPT | Performed by: PHYSICAL THERAPIST

## 2018-04-24 PROCEDURE — 97110 THERAPEUTIC EXERCISES: CPT | Performed by: PHYSICAL THERAPIST

## 2018-04-24 NOTE — PROGRESS NOTES
Daily Note     Today's date: 2018  Patient name: Kelsy Hammond  : 1945  MRN: [id number]  Referring provider: Mireya Aguilar MD  Dx:   Encounter Diagnosis     ICD-10-CM    1  Myofascial pain syndrome M79 1    2  Spondylosis of cervical region without myelopathy or radiculopathy M47 812                   Subjective: Pt notes that she is doing well today  She states that her neck is feeling better today  Objective: See treatment diary below      Assessment: Tolerated treatment well  Patient demonstrated fatigue post treatment, exhibited good technique with therapeutic exercises, would benefit from continued PT and requires cueing during stretching  Increased resistance for tband rows and ext with good tolerance  Plan: Continue per plan of care  Progress treatment as tolerated        Precautions: HTN, arrhythmia, GERD    Daily Treatment Diary     Manual           C/s distraction 8' 8' 8' 8'         SOR 2' 2' 2' 2'                                                    Exercise Diary           UBE NV 4' retro 4' retro hold         Levator stretch 10" x5 10"x5 10" x5 10" x5         UT stretch 10" x5 10"x5 10" x5 10" x5         scap retraction 10x 15x 15x 15x         Ant scalene stretch NV 10"x5 10" x5 10" x5         C/s retraction NV 10x 10x 10x         shld rows NV 10x 10x 20x GTB         shld ext NV 10x 10x 20x GTB         Thoracic ext over chair  10"x10 10"x10 10" x10         Low trap lifts  NV 5", 2x10 RTB 5", 2x10 RTB                                                                                                                                               Modalities             MHP   10' post tx 10' post tx

## 2018-04-25 ENCOUNTER — APPOINTMENT (OUTPATIENT)
Dept: LAB | Facility: CLINIC | Age: 73
End: 2018-04-25
Payer: MEDICARE

## 2018-04-25 ENCOUNTER — TRANSCRIBE ORDERS (OUTPATIENT)
Dept: LAB | Facility: CLINIC | Age: 73
End: 2018-04-25

## 2018-04-25 DIAGNOSIS — I48.91 ATRIAL FIBRILLATION, UNSPECIFIED TYPE (HCC): ICD-10-CM

## 2018-04-25 LAB
ALBUMIN SERPL BCP-MCNC: 3.5 G/DL (ref 3.5–5)
ALP SERPL-CCNC: 62 U/L (ref 46–116)
ALT SERPL W P-5'-P-CCNC: 27 U/L (ref 12–78)
ANION GAP SERPL CALCULATED.3IONS-SCNC: 5 MMOL/L (ref 4–13)
AST SERPL W P-5'-P-CCNC: 20 U/L (ref 5–45)
BILIRUB SERPL-MCNC: 1.1 MG/DL (ref 0.2–1)
BUN SERPL-MCNC: 20 MG/DL (ref 5–25)
CALCIUM SERPL-MCNC: 9.3 MG/DL (ref 8.3–10.1)
CHLORIDE SERPL-SCNC: 107 MMOL/L (ref 100–108)
CHOLEST SERPL-MCNC: 163 MG/DL (ref 50–200)
CO2 SERPL-SCNC: 29 MMOL/L (ref 21–32)
CREAT SERPL-MCNC: 0.92 MG/DL (ref 0.6–1.3)
ERYTHROCYTE [DISTWIDTH] IN BLOOD BY AUTOMATED COUNT: 13.2 % (ref 11.6–15.1)
GFR SERPL CREATININE-BSD FRML MDRD: 62 ML/MIN/1.73SQ M
GLUCOSE P FAST SERPL-MCNC: 98 MG/DL (ref 65–99)
HCT VFR BLD AUTO: 39.1 % (ref 34.8–46.1)
HDLC SERPL-MCNC: 58 MG/DL (ref 40–60)
HGB BLD-MCNC: 13.3 G/DL (ref 11.5–15.4)
LDLC SERPL CALC-MCNC: 90 MG/DL (ref 0–100)
MCH RBC QN AUTO: 31.4 PG (ref 26.8–34.3)
MCHC RBC AUTO-ENTMCNC: 34 G/DL (ref 31.4–37.4)
MCV RBC AUTO: 92 FL (ref 82–98)
NONHDLC SERPL-MCNC: 105 MG/DL
PLATELET # BLD AUTO: 253 THOUSANDS/UL (ref 149–390)
PMV BLD AUTO: 11 FL (ref 8.9–12.7)
POTASSIUM SERPL-SCNC: 4.3 MMOL/L (ref 3.5–5.3)
PROT SERPL-MCNC: 7 G/DL (ref 6.4–8.2)
RBC # BLD AUTO: 4.23 MILLION/UL (ref 3.81–5.12)
SODIUM SERPL-SCNC: 141 MMOL/L (ref 136–145)
TRIGL SERPL-MCNC: 76 MG/DL
WBC # BLD AUTO: 5.09 THOUSAND/UL (ref 4.31–10.16)

## 2018-04-25 PROCEDURE — 36415 COLL VENOUS BLD VENIPUNCTURE: CPT | Performed by: INTERNAL MEDICINE

## 2018-04-25 PROCEDURE — 80053 COMPREHEN METABOLIC PANEL: CPT | Performed by: INTERNAL MEDICINE

## 2018-04-25 PROCEDURE — 80061 LIPID PANEL: CPT | Performed by: INTERNAL MEDICINE

## 2018-04-25 PROCEDURE — 85027 COMPLETE CBC AUTOMATED: CPT

## 2018-04-27 ENCOUNTER — OFFICE VISIT (OUTPATIENT)
Dept: PHYSICAL THERAPY | Facility: MEDICAL CENTER | Age: 73
End: 2018-04-27
Payer: MEDICARE

## 2018-04-27 DIAGNOSIS — M79.18 MYOFASCIAL PAIN SYNDROME: Primary | ICD-10-CM

## 2018-04-27 DIAGNOSIS — M47.812 SPONDYLOSIS OF CERVICAL REGION WITHOUT MYELOPATHY OR RADICULOPATHY: ICD-10-CM

## 2018-04-27 PROCEDURE — 97140 MANUAL THERAPY 1/> REGIONS: CPT | Performed by: PHYSICAL THERAPIST

## 2018-04-27 PROCEDURE — 97110 THERAPEUTIC EXERCISES: CPT | Performed by: PHYSICAL THERAPIST

## 2018-04-27 NOTE — PROGRESS NOTES
Daily Note     Today's date: 2018  Patient name: Zander Fox  : 1945  MRN: [id number]  Referring provider: Dao Gonzalez MD  Dx:   Encounter Diagnosis     ICD-10-CM    1  Myofascial pain syndrome M79 1    2  Spondylosis of cervical region without myelopathy or radiculopathy M47 812                   Subjective: Pt notes she is doing well today  She was outside working yesterday and denies any neck issues  She states that most of her pain remains when she is inactive  Objective: See treatment diary below      Assessment: Tolerated treatment well  Patient demonstrated fatigue post treatment, exhibited good technique with therapeutic exercises and would benefit from continued PT  Added capital flexion with proper performance  She did not demonstrate any compensation during  Some things held today due to patients time constraints as she has another MD appointment  Will resume NV  Plan: Continue per plan of care  Progress treatment as tolerated        Precautions: HTN, arrhythmia, GERD    Daily Treatment Diary     Manual          C/s distraction 8' 8' 8' 8' 8'        SOR 2' 2' 2' 2' 2'                                                   Exercise Diary          UBE NV 4' retro 4' retro hold hold        Levator stretch 10" x5 10"x5 10" x5 10" x5 HEP        UT stretch 10" x5 10"x5 10" x5 10" x5 HEP        scap retraction 10x 15x 15x 15x HEP        Ant scalene stretch NV 10"x5 10" x5 10" x5 HEP        C/s retraction NV 10x 10x 10x held        shld rows NV 10x 10x 20x GTB GTB 20x        shld ext NV 10x 10x 20x GTB GTB 20x        Thoracic ext over chair  10"x10 10"x10 10" x10 10" x10        Low trap lifts  NV 5", 2x10 RTB 5", 2x10 RTB 5" x20 RTB        Supine capital flexion     15x                                                                                                                                 Modalities            [facility] 10' post tx 10' post tx 10' pre tx

## 2018-05-01 ENCOUNTER — OFFICE VISIT (OUTPATIENT)
Dept: PHYSICAL THERAPY | Facility: MEDICAL CENTER | Age: 73
End: 2018-05-01
Payer: MEDICARE

## 2018-05-01 DIAGNOSIS — M79.18 MYOFASCIAL PAIN SYNDROME: Primary | ICD-10-CM

## 2018-05-01 DIAGNOSIS — M47.812 SPONDYLOSIS OF CERVICAL REGION WITHOUT MYELOPATHY OR RADICULOPATHY: ICD-10-CM

## 2018-05-01 PROCEDURE — 97140 MANUAL THERAPY 1/> REGIONS: CPT | Performed by: PHYSICAL THERAPIST

## 2018-05-01 PROCEDURE — 97110 THERAPEUTIC EXERCISES: CPT | Performed by: PHYSICAL THERAPIST

## 2018-05-01 NOTE — PROGRESS NOTES
Daily Note     Today's date: 2018  Patient name: Igor Beltran  : 1945  MRN: [id number]  Referring provider: Yolie Rojas MD  Dx:   Encounter Diagnosis     ICD-10-CM    1  Myofascial pain syndrome M79 1    2  Spondylosis of cervical region without myelopathy or radiculopathy M47 812                   Subjective: Pt notes that she is doing well today  She is feeling better overall, and states that she did have some pain this morning, but it is not as painful as it was before  Objective: See treatment diary below      Assessment: Tolerated treatment well  Patient demonstrated fatigue post treatment, exhibited good technique with therapeutic exercises, would benefit from continued PT and has best response to manual cervical distraction  Plan: Continue per plan of care  Progress treatment as tolerated           Precautions: HTN, arrhythmia, GERD    Daily Treatment Diary     Manual         C/s distraction 8' 8' 8' 8' 8' 8'       SOR 2' 2' 2' 2' 2' 2'                                                  Exercise Diary         UBE NV 4' retro 4' retro hold hold Hold, NV       Levator stretch 10" x5 10"x5 10" x5 10" x5 HEP HEP       UT stretch 10" x5 10"x5 10" x5 10" x5 HEP HEP       scap retraction 10x 15x 15x 15x HEP HEP       Ant scalene stretch NV 10"x5 10" x5 10" x5 HEP HEP       C/s retraction NV 10x 10x 10x held 15x       shld rows NV 10x 10x 20x GTB GTB 20x GTB 20x       shld ext NV 10x 10x 20x GTB GTB 20x GTB 20x       Thoracic ext over chair  10"x10 10"x10 10" x10 10" x10 10"x 10       Low trap lifts  NV 5", 2x10 RTB 5", 2x10 RTB 5" x20 RTB 5" x20 GTB       Supine capital flexion     15x 15x       Wall slides      10x                                                                                                                   Modalities           MHP   10' post tx 10' post tx 10' pre tx 10' pre tx

## 2018-05-03 ENCOUNTER — OFFICE VISIT (OUTPATIENT)
Dept: PHYSICAL THERAPY | Facility: MEDICAL CENTER | Age: 73
End: 2018-05-03
Payer: MEDICARE

## 2018-05-03 DIAGNOSIS — M47.812 SPONDYLOSIS OF CERVICAL REGION WITHOUT MYELOPATHY OR RADICULOPATHY: ICD-10-CM

## 2018-05-03 DIAGNOSIS — M79.18 MYOFASCIAL PAIN SYNDROME: Primary | ICD-10-CM

## 2018-05-03 PROCEDURE — 97140 MANUAL THERAPY 1/> REGIONS: CPT | Performed by: PHYSICAL THERAPIST

## 2018-05-03 PROCEDURE — 97110 THERAPEUTIC EXERCISES: CPT | Performed by: PHYSICAL THERAPIST

## 2018-05-03 NOTE — PROGRESS NOTES
Daily Note     Today's date: 5/3/2018  Patient name: Gene De   : 1945  MRN: [id number]  Referring provider: Vickie Oconnell MD  Dx:   Encounter Diagnosis     ICD-10-CM    1  Myofascial pain syndrome M79 1    2  Spondylosis of cervical region without myelopathy or radiculopathy M47 812                   Subjective: Pt notes that she is feeling better overall  She does still have some pain, but it has reduced overall  Objective: See treatment diary below      Assessment: Tolerated treatment well  Patient demonstrated fatigue post treatment, exhibited good technique with therapeutic exercises and would benefit from continued PT  Resumed arm bike with good tolerance  Will add corner pec stretch NV  Pt does require cueing for speed of movements  Plan: Continue per plan of care  Progress treatment as tolerated          Precautions: HTN, arrhythmia, GERD    Daily Treatment Diary     Manual  /3      C/s distraction 8' 8' 8' 8' 8' 8' 8'      SOR 2' 2' 2' 2' 2' 2' 2'                                                 Exercise Diary   5/3      UBE NV 4' retro 4' retro hold hold Hold, NV 4' retro      Levator stretch 10" x5 10"x5 10" x5 10" x5 HEP HEP HEP      UT stretch 10" x5 10"x5 10" x5 10" x5 HEP HEP HEP      scap retraction 10x 15x 15x 15x HEP HEP HEP      Ant scalene stretch NV 10"x5 10" x5 10" x5 HEP HEP HEP      C/s retraction NV 10x 10x 10x held 15x 15x      shld rows NV 10x 10x 20x GTB GTB 20x GTB 20x GTB 20x      shld ext NV 10x 10x 20x GTB GTB 20x GTB 20x GTB 20x      Thoracic ext over chair  10"x10 10"x10 10" x10 10" x10 10"x 10 10" x10      Low trap lifts  NV 5", 2x10 RTB 5", 2x10 RTB 5" x20 RTB 5" x20 GTB 5" x20 GTB      Supine capital flexion     15x 15x 15x      Wall slides      10x 10x      Corner pec stretch       NV Modalities    4/20 4/24 4/27 5/1 5/3      MHP   10' post tx 10' post tx 10' pre tx 10' pre tx 10' pre tx

## 2018-05-04 DIAGNOSIS — I10 HYPERTENSION, UNSPECIFIED TYPE: ICD-10-CM

## 2018-05-07 RX ORDER — AMLODIPINE BESYLATE 2.5 MG/1
2.5 TABLET ORAL DAILY
Qty: 90 TABLET | Refills: 3 | Status: SHIPPED | OUTPATIENT
Start: 2018-05-07 | End: 2018-11-29 | Stop reason: SDUPTHER

## 2018-05-08 ENCOUNTER — OFFICE VISIT (OUTPATIENT)
Dept: PHYSICAL THERAPY | Facility: MEDICAL CENTER | Age: 73
End: 2018-05-08
Payer: MEDICARE

## 2018-05-08 DIAGNOSIS — M47.812 SPONDYLOSIS OF CERVICAL REGION WITHOUT MYELOPATHY OR RADICULOPATHY: ICD-10-CM

## 2018-05-08 DIAGNOSIS — M79.18 MYOFASCIAL PAIN SYNDROME: Primary | ICD-10-CM

## 2018-05-08 PROCEDURE — 97110 THERAPEUTIC EXERCISES: CPT | Performed by: PHYSICAL THERAPIST

## 2018-05-08 PROCEDURE — 97112 NEUROMUSCULAR REEDUCATION: CPT | Performed by: PHYSICAL THERAPIST

## 2018-05-08 PROCEDURE — 97140 MANUAL THERAPY 1/> REGIONS: CPT | Performed by: PHYSICAL THERAPIST

## 2018-05-08 NOTE — PROGRESS NOTES
Daily Note     Today's date: 2018  Patient name: Kaz Packer  : 1945  MRN: [id number]  Referring provider: Wynona Habermann, MD  Dx:   Encounter Diagnosis     ICD-10-CM    1  Myofascial pain syndrome M79 1    2  Spondylosis of cervical region without myelopathy or radiculopathy M47 812                   Subjective: Pt notes that she is doing well overall  She notes that her pain has improved, and she notes that it bothers her minimally  Objective: See treatment diary below      Assessment: Tolerated treatment well  Patient demonstrated fatigue post treatment, exhibited good technique with therapeutic exercises and would benefit from continued PT      Plan: Continue per plan of care  Progress note during next visit       Precautions: HTN, arrhythmia, GERD    Daily Treatment Diary     Manual  4/11 4/19 4/20 4/24 4/27 5/1 5/3 5/8     C/s distraction 8' 8' 8' 8' 8' 8' 8' 10'     SOR 2' 2' 2' 2' 2' 2' 2' 5'                                                Exercise Diary  4/11 4/19 4/20 4/24 4/27 5/1 5/3 5/8     UBE NV 4' retro 4' retro hold hold Hold, NV 4' retro 4' retro     Levator stretch 10" x5 10"x5 10" x5 10" x5 HEP HEP HEP HEP     UT stretch 10" x5 10"x5 10" x5 10" x5 HEP HEP HEP HEP     scap retraction 10x 15x 15x 15x HEP HEP HEP HEP     Ant scalene stretch NV 10"x5 10" x5 10" x5 HEP HEP HEP HEP     C/s retraction NV 10x 10x 10x held 15x 15x 15x     shld rows NV 10x 10x 20x GTB GTB 20x GTB 20x GTB 20x GTB 20x     shld ext NV 10x 10x 20x GTB GTB 20x GTB 20x GTB 20x GTB 20x     Thoracic ext over chair  10"x10 10"x10 10" x10 10" x10 10"x 10 10" x10 10" x10     Low trap lifts  NV 5", 2x10 RTB 5", 2x10 RTB 5" x20 RTB 5" x20 GTB 5" x20 GTB 5" x20 GTB     Supine capital flexion     15x 15x 15x 15x     Wall slides      10x 10x 10x     Corner pec stretch       NV 20" x4                                                                                                    Modalities     5/3 5/8     [facility]   10' post tx 10' post tx 10' pre tx 10' pre tx 10' pre tx 10' pre tx

## 2018-05-10 ENCOUNTER — EVALUATION (OUTPATIENT)
Dept: PHYSICAL THERAPY | Facility: MEDICAL CENTER | Age: 73
End: 2018-05-10
Payer: MEDICARE

## 2018-05-10 DIAGNOSIS — M47.812 SPONDYLOSIS OF CERVICAL REGION WITHOUT MYELOPATHY OR RADICULOPATHY: ICD-10-CM

## 2018-05-10 DIAGNOSIS — M79.18 MYOFASCIAL PAIN SYNDROME: Primary | ICD-10-CM

## 2018-05-10 PROCEDURE — 97110 THERAPEUTIC EXERCISES: CPT | Performed by: PHYSICAL THERAPIST

## 2018-05-10 PROCEDURE — 97112 NEUROMUSCULAR REEDUCATION: CPT | Performed by: PHYSICAL THERAPIST

## 2018-05-10 PROCEDURE — G8984 CARRY CURRENT STATUS: HCPCS | Performed by: PHYSICAL THERAPIST

## 2018-05-10 PROCEDURE — G8985 CARRY GOAL STATUS: HCPCS | Performed by: PHYSICAL THERAPIST

## 2018-05-10 NOTE — PROGRESS NOTES
PT Re-Evaluation     Today's date: 5/10/2018  Patient name: Syed Padron  : 1945  MRN: [id number]  Referring provider: Steve Kincaid MD  Dx:   Encounter Diagnosis     ICD-10-CM    1  Myofascial pain syndrome M79 1    2  Spondylosis of cervical region without myelopathy or radiculopathy M47 812                   Assessment  Impairments: abnormal gait, abnormal muscle firing, abnormal muscle tone, abnormal or restricted ROM, abnormal movement, activity intolerance, impaired physical strength, lacks appropriate home exercise program and pain with function  Other impairment: postural dysfunction    Assessment details: Syed Padron has attended 9 visits since initiating skilled PT and has demonstrated overall improvement in mobility, strength, and function, with a reduction in pain  Currently, she has made steady progress towards her goals, but continues to remain limited with musle irritation along with limitations in L cervical rotation and R lateral flexion  At this time, skilled physical therapy is warranted in order to address their remaining impairments and aide in return to functional activities  It is recommended that they continue to be seen 2x/week for an additional 4 weeks  Thank you for this pleasant referral!     Understanding of Dx/Px/POC: excellent   Prognosis: good  Prognosis details: (+) prognostic factors- positive attitude towards recovery and positive response to PT in the past  (-) prognostic factors- PMH of HTN, arrhythmia, and length of neck pain    Goals  Short Term Goals: to be achieved by 4 weeks  1) Patient to be independent with basic HEP - MET  2) Decrease pain to 4/10 at its worst - MET  3) Increase cervical spine ROM by 10% in all deficient planes  - MET  4) Patient to report decreased sleep interruption secondary to pain  - MET  5) Increase seated to 20 min  - PARTIALLY MET    Long Term Goals: to be achieved by discharge  1) FOTO equal to or greater than 72 - NOT MET  2) Patient to be independent with comprehensive HEP  - PARTIALLY MET  3) Abolish pain for improved quality of life  - PARTIALLY MET  4) Cervical spine ROM WNL all planes to improve a/iadls  - PARTIALLY MET  5) Patient to report no sleep interruption secondary to pain  - MET  6) Increase seated to 60 min - NOT MET      Plan  Patient would benefit from: skilled PT  Referral necessary: No  Planned modality interventions: biofeedback, cryotherapy, TENS, thermotherapy: hydrocollator packs and unattended electrical stimulation  Planned therapy interventions: abdominal trunk stabilization, activity modification, ADL retraining, ADL training, behavior modification, body mechanics training, breathing training, functional ROM exercises, home exercise program, IADL retraining, joint mobilization, manual therapy, massage, motor coordination training, neuromuscular re-education, patient education, postural training, self care, strengthening, stretching, therapeutic activities, therapeutic exercise and transfer training  Frequency: 2x week  Duration in weeks: 4  Treatment plan discussed with: patient        Subjective Evaluation    History of Present Illness  Mechanism of injury: Pt states that she has improved overall  She feels that she is 80% improved  She notes that she still has trouble when she has been reading for 5 mins  Her pain has been cut in half, and she will notice that if she sits with proper posture, the pain will also improve  Recurrent probem    Quality of life: good    Pain  Current pain ratin  At best pain ratin  At worst pain ratin  Location: L UT, Levator   Quality: soreness      Hand dominance: right    Treatments  Previous treatment: injection treatment (accupunture )  Patient Goals  Patient goal: Pt states that her goal for PT is to get "rid of it all "        Objective     Special Questions  Negative for dizziness, headaches, nausea/motion sickness, tinnitus, trouble swallowing and visual change    Postural Observations  Seated posture: good  Standing posture: fair        Palpation   Left   Muscle spasm in the levator scapulae and upper trapezius  Tenderness of the levator scapulae, suboccipitals and upper trapezius  Trigger point to levator scapulae and upper trapezius  Neurological Testing     Sensation   Cervical/Thoracic   Left   Intact: pin prick    Right   Intact: pin prick    Reflexes   Left   Biceps (C5/C6): normal (2+)  Brachioradialis (C6): normal (2+)  Triceps (C7): normal (2+)    Right   Biceps (C5/C6): normal (2+)  Brachioradialis (C6): normal (2+)  Triceps (C7): normal (2+)    Active Range of Motion   Cervical/Thoracic Spine   Cervical    Flexion: Neck active flexion: pulling  WFL  Extension: WFL  Left lateral flexion: Neck active lateral bend left: 25%   Right lateral flexion: Neck active lateral bend right: 50% with pain  Left rotation: Neck active rotation left: 75% with pain  Right rotation: [facility]    Joint Play Hypomobile: C6 and C7     Strength/Myotome Testing   Cervical Spine     Left   Normal strength    Right etr  Normal strength    Tests   Cervical     Left   Positive cervical distraction  Negative Spurling's sign  Right   Negative cervical distraction and Spurling's sign         Flowsheet Rows      Most Recent Value   PT/OT G-Codes   Current Score  65   Projected Score  70   FOTO information reviewed  Yes   Assessment Type  Re-evaluation   G code set  Carrying, Moving & Handling Objects   Carrying, Moving and Handling Objects Current Status ()  CJ   Carrying, Moving and Handling Objects Goal Status ()  CI            Precautions: HTN, arrhythmia, GERD    Daily Treatment Diary     Manual  4/11 4/19 4/20 4/24 4/27 5/1 5/3 5/8 5/10    C/s distraction 8' 8' 8' 8' 8' 8' 8' 10' 10'    SOR 2' 2' 2' 2' 2' 2' 2' 5' 5'                                               Exercise Diary  4/11 4/19 4/20 4/24 4/27 5/1 5/3 5/8 5/10    UBE NV 4' retro 4' retro hold hold Hold, NV 4' retro 4' retro 4' retro    Levator stretch 10" x5 10"x5 10" x5 10" x5 HEP HEP HEP HEP HEP    UT stretch 10" x5 10"x5 10" x5 10" x5 HEP HEP HEP HEP HEP    scap retraction 10x 15x 15x 15x HEP HEP HEP HEP HEP    Ant scalene stretch NV 10"x5 10" x5 10" x5 HEP HEP HEP HEP HEP    C/s retraction NV 10x 10x 10x held 15x 15x 15x 20x    shld rows NV 10x 10x 20x GTB GTB 20x GTB 20x GTB 20x GTB 20x GTB 20x    shld ext NV 10x 10x 20x GTB GTB 20x GTB 20x GTB 20x GTB 20x GTB 20x    Thoracic ext over chair  10"x10 10"x10 10" x10 10" x10 10"x 10 10" x10 10" x10 10" x10    Low trap lifts  NV 5", 2x10 RTB 5", 2x10 RTB 5" x20 RTB 5" x20 GTB 5" x20 GTB 5" x20 GTB 5" x20 BTB    Supine capital flexion     15x 15x 15x 15x 15x    Wall slides      10x 10x 10x 10x    Corner pec stretch       NV 20" x4 20" x4    DNF         5x                                                                                      Modalities    4/20 4/24 4/27 5/1 5/3 5/8 5/10    MHP   10' post tx 10' post tx 10' pre tx 10' pre tx 10' pre tx 10' pre tx 10' pre tx

## 2018-05-15 ENCOUNTER — OFFICE VISIT (OUTPATIENT)
Dept: PHYSICAL THERAPY | Facility: MEDICAL CENTER | Age: 73
End: 2018-05-15
Payer: MEDICARE

## 2018-05-15 DIAGNOSIS — M79.18 MYOFASCIAL PAIN SYNDROME: Primary | ICD-10-CM

## 2018-05-15 DIAGNOSIS — M47.812 SPONDYLOSIS OF CERVICAL REGION WITHOUT MYELOPATHY OR RADICULOPATHY: ICD-10-CM

## 2018-05-15 PROCEDURE — 97140 MANUAL THERAPY 1/> REGIONS: CPT | Performed by: PHYSICAL THERAPIST

## 2018-05-15 PROCEDURE — 97112 NEUROMUSCULAR REEDUCATION: CPT | Performed by: PHYSICAL THERAPIST

## 2018-05-15 NOTE — PROGRESS NOTES
Daily Note     Today's date: 5/15/2018  Patient name: Molina Castillo  : 1945  MRN: [id number]  Referring provider: Bettina Alcaraz MD  Dx:   Encounter Diagnosis     ICD-10-CM    1  Myofascial pain syndrome M79 1    2  Spondylosis of cervical region without myelopathy or radiculopathy M47 812                   Subjective: Pt notes that her neck is feeling okay today  Objective: See treatment diary below      Assessment: Tolerated treatment well  Patient demonstrated fatigue post treatment, exhibited good technique with therapeutic exercises, would benefit from continued PT and required cueing to properly perform low trap lifts and avoid UT compesnation  Plan: Continue per plan of care  Progress treatment as tolerated        Precautions: HTN, arrhythmia, GERD    Daily Treatment Diary     Manual  4/11 4/19 4/20 4/24 4/27 5/1 5/3 5/8 5/10 5/15   C/s distraction 8' 8' 8' 8' 8' 8' 8' 10' 10' 10'   SOR 2' 2' 2' 2' 2' 2' 2' 5' 5' 5'                                              Exercise Diary   5/3 5/8 5/10 5/15   UBE NV 4' retro 4' retro hold hold Hold, NV 4' retro 4' retro 4' retro In use (nv)   Levator stretch 10" x5 10"x5 10" x5 10" x5 HEP HEP HEP HEP HEP HEP   UT stretch 10" x5 10"x5 10" x5 10" x5 HEP HEP HEP HEP HEP HEP   scap retraction 10x 15x 15x 15x HEP HEP HEP HEP HEP HEP   Ant scalene stretch NV 10"x5 10" x5 10" x5 HEP HEP HEP HEP HEP HEP   C/s retraction NV 10x 10x 10x held 15x 15x 15x 20x 20x   shld rows NV 10x 10x 20x GTB GTB 20x GTB 20x GTB 20x GTB 20x GTB 20x BTB 20x   shld ext NV 10x 10x 20x GTB GTB 20x GTB 20x GTB 20x GTB 20x GTB 20x BTB 20x   Thoracic ext over chair  10"x10 10"x10 10" x10 10" x10 10"x 10 10" x10 10" x10 10" x10 10" x10   Low trap lifts  NV 5", 2x10 RTB 5", 2x10 RTB 5" x20 RTB 5" x20 GTB 5" x20 GTB 5" x20 GTB 5" x20 BTB 5" x20   Supine capital flexion     15x 15x 15x 15x 15x 15x   Wall slides      10x 10x 10x 10x 10x   Corner pec stretch NV 20" x4 20" x4 20" x4   DNF         5x 5x                                                                                     Modalities    4/20 4/24 4/27 5/1 5/3 5/8 5/10 5/14   MHP   10' post tx 10' post tx 10' pre tx 10' pre tx 10' pre tx 10' pre tx 10' pre tx 10' pre tx

## 2018-05-17 ENCOUNTER — OFFICE VISIT (OUTPATIENT)
Dept: PHYSICAL THERAPY | Facility: MEDICAL CENTER | Age: 73
End: 2018-05-17
Payer: MEDICARE

## 2018-05-17 DIAGNOSIS — M79.18 MYOFASCIAL PAIN SYNDROME: Primary | ICD-10-CM

## 2018-05-17 DIAGNOSIS — M47.812 SPONDYLOSIS OF CERVICAL REGION WITHOUT MYELOPATHY OR RADICULOPATHY: ICD-10-CM

## 2018-05-17 PROCEDURE — 97140 MANUAL THERAPY 1/> REGIONS: CPT | Performed by: PHYSICAL THERAPIST

## 2018-05-17 PROCEDURE — 97112 NEUROMUSCULAR REEDUCATION: CPT | Performed by: PHYSICAL THERAPIST

## 2018-05-17 NOTE — PROGRESS NOTES
Daily Note     Today's date: 2018  Patient name: Yahaira Sy  : 1945  MRN: [id number]  Referring provider: Marianna Garcia MD  Dx:   Encounter Diagnosis     ICD-10-CM    1  Myofascial pain syndrome M79 1    2  Spondylosis of cervical region without myelopathy or radiculopathy M47 812                   Subjective: Pt notes that she is feeling pretty well today, as her neck is doing better and better  She is complaining of persistent knee pain  Objective: See treatment diary below      Assessment: Tolerated treatment well  Patient demonstrated fatigue post treatment, exhibited good technique with therapeutic exercises, would benefit from continued PT and is better able to perform wall slides without compensation  Will add OH KB press NV  Plan: Continue per plan of care  Progress treatment as tolerated            Precautions: HTN, arrhythmia, GERD    Daily Treatment Diary     Manual              C/s distraction 10'            SOR 5'                                                       Exercise Diary              UBE np-PT error            c/s retraction 20x            shld rows BTB 20x            shld ext BTB 20x            t/s ext over chair 10"x10            Low trap lifts BTB 5" x20            Supine  Capital flexion 15x            Wall slides 10x            Corner pec stretch 20" x4            DNF 5x            KB OH press NV                                                                                                                                     Modalities              MHP 10' pre tx

## 2018-05-18 ENCOUNTER — OFFICE VISIT (OUTPATIENT)
Dept: OBGYN CLINIC | Facility: MEDICAL CENTER | Age: 73
End: 2018-05-18
Payer: MEDICARE

## 2018-05-18 VITALS
BODY MASS INDEX: 28.52 KG/M2 | HEIGHT: 62 IN | SYSTOLIC BLOOD PRESSURE: 115 MMHG | DIASTOLIC BLOOD PRESSURE: 67 MMHG | HEART RATE: 56 BPM | WEIGHT: 155 LBS

## 2018-05-18 DIAGNOSIS — M25.461 EFFUSION OF RIGHT KNEE: ICD-10-CM

## 2018-05-18 DIAGNOSIS — M25.561 CHRONIC PAIN OF RIGHT KNEE: ICD-10-CM

## 2018-05-18 DIAGNOSIS — G89.29 CHRONIC PAIN OF RIGHT KNEE: ICD-10-CM

## 2018-05-18 DIAGNOSIS — M17.11 PRIMARY OSTEOARTHRITIS OF RIGHT KNEE: Primary | ICD-10-CM

## 2018-05-18 PROCEDURE — 20610 DRAIN/INJ JOINT/BURSA W/O US: CPT | Performed by: PHYSICIAN ASSISTANT

## 2018-05-18 PROCEDURE — 99213 OFFICE O/P EST LOW 20 MIN: CPT | Performed by: PHYSICIAN ASSISTANT

## 2018-05-18 RX ORDER — BETAMETHASONE SODIUM PHOSPHATE AND BETAMETHASONE ACETATE 3; 3 MG/ML; MG/ML
6 INJECTION, SUSPENSION INTRA-ARTICULAR; INTRALESIONAL; INTRAMUSCULAR; SOFT TISSUE
Status: COMPLETED | OUTPATIENT
Start: 2018-05-18 | End: 2018-05-18

## 2018-05-18 RX ORDER — LIDOCAINE HYDROCHLORIDE 10 MG/ML
2 INJECTION, SOLUTION INFILTRATION; PERINEURAL
Status: COMPLETED | OUTPATIENT
Start: 2018-05-18 | End: 2018-05-18

## 2018-05-18 RX ORDER — BUPIVACAINE HYDROCHLORIDE 2.5 MG/ML
2 INJECTION, SOLUTION INFILTRATION; PERINEURAL
Status: COMPLETED | OUTPATIENT
Start: 2018-05-18 | End: 2018-05-18

## 2018-05-18 RX ADMIN — BUPIVACAINE HYDROCHLORIDE 2 ML: 2.5 INJECTION, SOLUTION INFILTRATION; PERINEURAL at 08:49

## 2018-05-18 RX ADMIN — LIDOCAINE HYDROCHLORIDE 2 ML: 10 INJECTION, SOLUTION INFILTRATION; PERINEURAL at 08:49

## 2018-05-18 RX ADMIN — BETAMETHASONE SODIUM PHOSPHATE AND BETAMETHASONE ACETATE 6 MG: 3; 3 INJECTION, SUSPENSION INTRA-ARTICULAR; INTRALESIONAL; INTRAMUSCULAR; SOFT TISSUE at 08:49

## 2018-05-18 NOTE — PROGRESS NOTES
Assessment:  1  Primary osteoarthritis of right knee       Patient Active Problem List   Diagnosis    Atrial fibrillation with rapid ventricular response (HCC)    Essential hypertension    Dyslipidemia    Palpitations    Sacroiliitis (HCC)    Chronic pain of right knee    Primary osteoarthritis of right knee    Left hip pain    Trochanteric bursitis of left hip    Thumb pain, left           Plan      Intra-articular cortisone injection to the right knee today  She will follow up in 6-8 weeks for possible another repeat injection And to schedule right total knee arthroplasty for sometime in the fall  Patient was seen, examined and plan formulated by Dr Lazarus Dove              Subjective:     Patient ID:    Chief Complaint:Farnaz Martin 67 y o  female right knee pain  HPI    66 y/o female here for Recheck of her right knee  She is last seen approximately 10-12 weeks ago which she received a cortisone injection into the right knee, left wrist, and left greater trochanteric bursa  She had positive relief from all 3 of these injections  Over the last 2 weeks her knee pain has returned after doing some yard work  Her Right knee pain is significantly affecting her activities of daily living and she is considering scheduling total knee arthroplasty for this fall  She has a history of a left total knee arthroplasty done in an outside facility in 2008  The following portions of the patient's history were reviewed and updated as appropriate: allergies, current medications, past family history, past social history, past surgical history and problem list     All organ systems normal    Social History     Social History    Marital status: /Civil Union     Spouse name: N/A    Number of children: N/A    Years of education: N/A     Occupational History    Not on file       Social History Main Topics    Smoking status: Never Smoker    Smokeless tobacco: Never Used    Alcohol use Yes      Comment: Social alcohol use - per [employer]scripts    Drug use: No    Sexual activity: Not on file     Other Topics Concern    Not on file     Social History Narrative    No narrative on file     Past Medical History:   Diagnosis Date    Actinic keratosis     last assessed - 06Jun2014    Arthritis     Atrial fibrillation with rapid ventricular response ([address] )     last assessed - 26Apr2016    Basal cell carcinoma     Benign colon polyp     last assessed - 27Apr2015    Effusion of knee joint right     Resolved - 19Apr2016    Esophageal reflux     Fibromyalgia     last assessed - [id number]    Hypertension     Nummular eczema     last assessed - [id number]    Palpitations     last assessed - 30Apr2013    Peroneal tendonitis, right     last assessed - 02Oct2013    Rectal hemorrhage     last assessed - [id number]    Trochanteric bursitis, unspecified hip     last assessed - [id number]     Past Surgical History:   Procedure Laterality Date    CATARACT EXTRACTION      COLONOSCOPY  03/2018    EYE SURGERY      HYSTERECTOMY      JOINT REPLACEMENT      RECTAL POLYPECTOMY      REPLACEMENT TOTAL KNEE Left     last assessed - 27Apr2015    TONSILLECTOMY      TOTAL ABDOMINAL HYSTERECTOMY      TUBAL LIGATION       Allergies   Allergen Reactions    Ace Inhibitors     Penicillins      Current Outpatient Prescriptions on File Prior to Visit   Medication Sig Dispense Refill    amLODIPine (NORVASC) 2 5 mg tablet Take 1 tablet (2 5 mg total) by mouth daily 90 tablet 3    apixaban (ELIQUIS) 5 mg Take 1 tablet (5 mg total) by mouth 2 (two) times a day 56 tablet 0    B Complex-C-Folic Acid (B COMPLEX + C TR) TBCR Take by mouth daily   Cholecalciferol (CVS VITAMIN D) 2000 UNITS CAPS Take 2,000 Units by mouth daily   Chromium Picolinate POWD Take by mouth daily   ezetimibe (ZETIA) 10 mg tablet Zetia 10 MG Oral Tablet TAKE 1 TABLET DAILY    Quantity: 90;  Refills: 3    Yogesh PATRICIA MD;  Miriam Wade 16-Oct-2012 Active      gabapentin (NEURONTIN) 300 mg capsule Take 600 mg by mouth daily at bedtime Gabapentin 300 MG Oral Capsule TAKE ONE CAPSULE BY MOUTH THREE TIMES DAILY  Quantity: 90;  Refills: 1    Leatha Walsh MD;  Started 13-Jan-2016 Active       losartan (COZAAR) 100 MG tablet Losartan Potassium 100 MG Oral Tablet take 1 tablet by mouth once daily  Quantity: 90;  Refills: 3    Matthew PATRICIA MD;  Maximilian Roberts 16-Oct-2011 Active      rOPINIRole (REQUIP) 0 25 mg tablet TAKE ONE TABLET BY MOUTH AT BEDTIME  30 tablet 5    sotalol (BETAPACE) 80 mg tablet TAKE 1 TABLET BY MOUTH  TWICE A  tablet 3    traMADol (ULTRAM) 50 mg tablet TraMADol HCl - 50 MG Oral Tablet TAKE 1 TABLET TWICE DAILY AS NEEDED  Quantity: 180;  Refills: 1    Matthew PATRICIA MD;  Maximilian Roberts 29-May-2013 Active      zolpidem (AMBIEN) 10 mg tablet TAKE ONE TABLET BY MOUTH NIGHTLY at bedtime AS NEEDED FOR SLEEP  23 tablet 3     No current facility-administered medications on file prior to visit  Large joint arthrocentesis  Date/Time: 5/18/2018 8:49 AM  Supporting Documentation  Indications: pain   Procedure Details  Location: knee - R knee  Needle size: 18 G  Ultrasound guidance: no  Approach: lateral  Medications administered: 2 mL lidocaine 1 %; 6 mg betamethasone acetate-betamethasone sodium phosphate 6 (3-3) mg/mL; 2 mL bupivacaine 0 25 %    Aspirate: clear  Patient tolerance: patient tolerated the procedure well with no immediate complications  Dressing:  Sterile dressing applied        Objective:        Ortho Exam        no imaging today    Portions of the record may have been created with voice recognition software   Occasional wrong word or "sound a like" substitutions may have occurred due to the inherent limitations of voice recognition software   Read the chart carefully and recognize, using context, where substitutions have occurred

## 2018-05-22 ENCOUNTER — OFFICE VISIT (OUTPATIENT)
Dept: PHYSICAL THERAPY | Facility: MEDICAL CENTER | Age: 73
End: 2018-05-22
Payer: MEDICARE

## 2018-05-22 DIAGNOSIS — M79.18 MYOFASCIAL PAIN SYNDROME: Primary | ICD-10-CM

## 2018-05-22 DIAGNOSIS — M47.812 SPONDYLOSIS OF CERVICAL REGION WITHOUT MYELOPATHY OR RADICULOPATHY: ICD-10-CM

## 2018-05-22 PROCEDURE — 97140 MANUAL THERAPY 1/> REGIONS: CPT | Performed by: PHYSICAL THERAPIST

## 2018-05-22 PROCEDURE — 97112 NEUROMUSCULAR REEDUCATION: CPT | Performed by: PHYSICAL THERAPIST

## 2018-05-22 NOTE — PROGRESS NOTES
Daily Note     Today's date: 2018  Patient name: Jerrell Traore  : 1945  MRN: [id number]  Referring provider: Rikc Rader MD  Dx:   Encounter Diagnosis     ICD-10-CM    1  Myofascial pain syndrome M79 1    2  Spondylosis of cervical region without myelopathy or radiculopathy M47 812                   Subjective: Pt notes that she is doing well  She feels that her neck is really getting better overall, and had minimal to no issues with it over the weekend  Objective: See treatment diary below      Assessment: Tolerated treatment well  Patient demonstrated fatigue post treatment, exhibited good technique with therapeutic exercises, would benefit from continued PT and was very challenged with [employer] OH press today to prevent [place] compensation  Will continue to work [employer] resistive activities  Plan: Continue per plan of care  Progress treatment as tolerated          Precautions: HTN, arrhythmia, GERD    Daily Treatment Diary     Manual             C/s distraction 10' 10'           SOR 5' 5'                                                      Exercise Diary             UBE np-PT error np-pt error           c/s retraction 20x 20x           shld rows BTB 20x BTB 20x           shld ext BTB 20x BTB 20x           t/s ext over chair 10"x10 10"X10           Low trap lifts BTB 5" x20 BTB 5" x20           Supine  Capital flexion 15x 15x           Wall slides 10x 10x           Corner pec stretch 20" x4 20"x4           DNF 5x 5x           KB OH press NV 3# 7x                                                                                                                                    Modalities             MHP 10' pre tx 10' pre tx

## 2018-05-24 ENCOUNTER — OFFICE VISIT (OUTPATIENT)
Dept: PHYSICAL THERAPY | Facility: MEDICAL CENTER | Age: 73
End: 2018-05-24
Payer: MEDICARE

## 2018-05-24 DIAGNOSIS — M47.812 SPONDYLOSIS OF CERVICAL REGION WITHOUT MYELOPATHY OR RADICULOPATHY: ICD-10-CM

## 2018-05-24 DIAGNOSIS — M79.18 MYOFASCIAL PAIN SYNDROME: Primary | ICD-10-CM

## 2018-05-24 PROCEDURE — 97112 NEUROMUSCULAR REEDUCATION: CPT | Performed by: PHYSICAL THERAPIST

## 2018-05-24 PROCEDURE — 97140 MANUAL THERAPY 1/> REGIONS: CPT | Performed by: PHYSICAL THERAPIST

## 2018-05-24 PROCEDURE — 97110 THERAPEUTIC EXERCISES: CPT | Performed by: PHYSICAL THERAPIST

## 2018-05-24 NOTE — PROGRESS NOTES
Daily Note     Today's date: 2018  Patient name: Hayley Tabares  : 1945  MRN: [id number]  Referring provider: Ember Maya MD  Dx:   Encounter Diagnosis     ICD-10-CM    1  Myofascial pain syndrome M79 1    2  Spondylosis of cervical region without myelopathy or radiculopathy M47 812                   Subjective: Pt notes that she is more sore today, which she feels like it is due to the new exercise she tried last time  Objective: See treatment diary below      Assessment: Tolerated treatment well  Patient demonstrated fatigue post treatment, exhibited good technique with therapeutic exercises, would benefit from continued PT and decreased resistance for [facility] press today with improved tolerance  Added an upper thoracic extension stretch with good tolerance  Plan: Continue per plan of care  Progress treatment as tolerated          Precautions: HTN, arrhythmia, GERD    Daily Treatment Diary     Manual            C/s distraction 10' 10' 8'          SOR 5' 5' 5'          Upper t/s ext stretch   2'                                        Exercise Diary            UBE np-PT error np-pt error 3' retro          c/s retraction 20x 20x 20x          shld rows BTB 20x BTB 20x BTB 20x          shld ext BTB 20x BTB 20x BTB 20x          t/s ext over chair 10"x10 10"X10 10"x10          Low trap lifts BTB 5" x20 BTB 5" x20 BTB 5" x20          Supine  Capital flexion 15x 15x 15x          Wall slides 10x 10x 10x          Corner pec stretch 20" x4 20"x4 20"x4          DNF 5x 5x 5x          KB OH press NV 3# 7x 1# 5x                                                                                                                                   Modalities            MHP 10' pre tx 10' pre tx 10' pre tx

## 2018-05-29 ENCOUNTER — OFFICE VISIT (OUTPATIENT)
Dept: PHYSICAL THERAPY | Facility: MEDICAL CENTER | Age: 73
End: 2018-05-29
Payer: MEDICARE

## 2018-05-29 DIAGNOSIS — M79.18 MYOFASCIAL PAIN SYNDROME: Primary | ICD-10-CM

## 2018-05-29 DIAGNOSIS — M47.812 SPONDYLOSIS OF CERVICAL REGION WITHOUT MYELOPATHY OR RADICULOPATHY: ICD-10-CM

## 2018-05-29 PROCEDURE — 97140 MANUAL THERAPY 1/> REGIONS: CPT | Performed by: PHYSICAL THERAPIST

## 2018-05-29 PROCEDURE — 97110 THERAPEUTIC EXERCISES: CPT | Performed by: PHYSICAL THERAPIST

## 2018-05-29 NOTE — PROGRESS NOTES
Daily Note     Today's date: 2018  Patient name: Kelsy Hammond  : 1945  MRN: [id number]  Referring provider: Mireya Aguilar MD  Dx:   Encounter Diagnosis     ICD-10-CM    1  Myofascial pain syndrome M79 1    2  Spondylosis of cervical region without myelopathy or radiculopathy M47 812                   Subjective: Pt notes that she slept wrong last night and woke up with slight discomfort  Objective: See treatment diary below      Assessment: Tolerated treatment well  Patient demonstrated fatigue post treatment, exhibited good technique with therapeutic exercises, would benefit from continued PT and was able to perform [facility] lift exercises with good tolerance  Plan: Potential discharge next visit          Precautions: HTN, arrhythmia, GERD    Daily Treatment Diary     Manual           C/s distraction 10' 10' 8' 5'         SOR 5' 5' 5' 5'         Upper t/s ext stretch   2'                                        Exercise Diary           UBE np-PT error np-pt error 3' retro 4' retro         c/s retraction 20x 20x 20x 20x         shld rows BTB 20x BTB 20x BTB 20x BTB 20x         shld ext BTB 20x BTB 20x BTB 20x BTB 20x         t/s ext over chair 10"x10 10"X10 10"x10 10"X10         Low trap lifts BTB 5" x20 BTB 5" x20 BTB 5" x20 BTB 5" x20         Supine  Capital flexion 15x 15x 15x 15x         Wall slides 10x 10x 10x 10x         Corner pec stretch 20" x4 20"x4 20"x4 20" x4         DNF 5x 5x 5x 5x         KB OH press NV 3# 7x 1# 5x 1# 5x                                                                                                                                  Modalities           MHP 10' pre tx 10' pre tx 10' pre tx 10' pre tx

## 2018-05-31 ENCOUNTER — OFFICE VISIT (OUTPATIENT)
Dept: PHYSICAL THERAPY | Facility: MEDICAL CENTER | Age: 73
End: 2018-05-31
Payer: MEDICARE

## 2018-05-31 DIAGNOSIS — M79.18 MYOFASCIAL PAIN SYNDROME: Primary | ICD-10-CM

## 2018-05-31 DIAGNOSIS — M47.812 SPONDYLOSIS OF CERVICAL REGION WITHOUT MYELOPATHY OR RADICULOPATHY: ICD-10-CM

## 2018-05-31 PROCEDURE — 97112 NEUROMUSCULAR REEDUCATION: CPT | Performed by: PHYSICAL THERAPIST

## 2018-05-31 PROCEDURE — G8986 CARRY D/C STATUS: HCPCS | Performed by: PHYSICAL THERAPIST

## 2018-05-31 PROCEDURE — 97140 MANUAL THERAPY 1/> REGIONS: CPT | Performed by: PHYSICAL THERAPIST

## 2018-05-31 PROCEDURE — G8985 CARRY GOAL STATUS: HCPCS | Performed by: PHYSICAL THERAPIST

## 2018-05-31 NOTE — PROGRESS NOTES
Daily Note & Discharge     Today's date: 2018  Patient name: Hayley Tabares  : 1945  MRN: [id number]  Referring provider: Ember Maya MD  Dx:   Encounter Diagnosis     ICD-10-CM    1  Myofascial pain syndrome M79 1    2  Spondylosis of cervical region without myelopathy or radiculopathy M47 812                   Subjective: Pt notes that she is doing well today  She does have a little bit of soreness, but she still feels that today can be her last scheduled appointment  Objective: See treatment diary below      Assessment: Tolerated treatment well  Patient demonstrated fatigue post treatment, exhibited good technique with therapeutic exercises and at this time is ready to transition to an independent managment program  She was educated on reasons to return to therapy and importance of performing her HEP regularly  Plan: Discharge from skilled care        Precautions: HTN, arrhythmia, GERD    Daily Treatment Diary     Manual           C/s distraction 10' 10' 8' 5'         SOR 5' 5' 5' 5'         Upper t/s ext stretch   2'                                        Exercise Diary           UBE np-PT error np-pt error 3' retro 4' retro         c/s retraction 20x 20x 20x 20x         shld rows BTB 20x BTB 20x BTB 20x BTB 20x         shld ext BTB 20x BTB 20x BTB 20x BTB 20x         t/s ext over chair 10"x10 10"X10 10"x10 10"X10         Low trap lifts BTB 5" x20 BTB 5" x20 BTB 5" x20 BTB 5" x20         Supine  Capital flexion 15x 15x 15x 15x         Wall slides 10x 10x 10x 10x         Corner pec stretch 20" x4 20"x4 20"x4 20" x4         DNF 5x 5x 5x 5x         KB OH press NV 3# 7x 1# 5x 1# 5x                                                                                                                                  Modalities           MHP 10' pre tx 10' pre tx 10' pre tx 10' pre tx

## 2018-06-28 RX ORDER — GABAPENTIN 300 MG/1
CAPSULE ORAL
Qty: 180 CAPSULE | OUTPATIENT
Start: 2018-06-28

## 2018-06-28 NOTE — TELEPHONE ENCOUNTER
Left vm for pt that we received a request from [address] [place] [address], [address] for a refill for gabapentin, we do not accept refill requests from pharmacies, pt must contact the office if refill needed  C/B # and OH provided

## 2018-07-01 DIAGNOSIS — G47.01 INSOMNIA DUE TO MEDICAL CONDITION: ICD-10-CM

## 2018-07-02 RX ORDER — GABAPENTIN 300 MG/1
CAPSULE ORAL
Qty: 60 CAPSULE | Refills: 0 | Status: CANCELLED | OUTPATIENT
Start: 2018-07-02

## 2018-07-02 NOTE — TELEPHONE ENCOUNTER
S/W pt and made her aware that we can't accept requests from pharmacies so when she needs a refill to call the office directly  Pt said she got my message and did c/b and leave a message  I apologized that I had not received that message yet  Pt said she does need a 3 month refill of gabapentin 300 mg 2 caps at HS sent to her mail order pharmacy, [facility]

## 2018-07-03 ENCOUNTER — TELEPHONE (OUTPATIENT)
Dept: PAIN MEDICINE | Facility: CLINIC | Age: 73
End: 2018-07-03

## 2018-07-03 DIAGNOSIS — G47.01 INSOMNIA DUE TO MEDICAL CONDITION: ICD-10-CM

## 2018-07-03 RX ORDER — ZOLPIDEM TARTRATE 10 MG/1
TABLET ORAL
Qty: 23 TABLET | Refills: 2 | Status: SHIPPED | OUTPATIENT
Start: 2018-07-03 | End: 2018-07-25 | Stop reason: SDUPTHER

## 2018-07-03 NOTE — TELEPHONE ENCOUNTER
She has 3 to 4 days left on her medications  How much could you order before we make an appointment?

## 2018-07-03 NOTE — TELEPHONE ENCOUNTER
The patient was last seen 6 months ago she will need to come in for an office visit  How much gabapentin does she have remaining? I can provide her a prescription to bridge until she comes in for an office visit

## 2018-07-05 ENCOUNTER — OFFICE VISIT (OUTPATIENT)
Dept: PAIN MEDICINE | Facility: CLINIC | Age: 73
End: 2018-07-05
Payer: MEDICARE

## 2018-07-05 VITALS
BODY MASS INDEX: 29.26 KG/M2 | SYSTOLIC BLOOD PRESSURE: 128 MMHG | TEMPERATURE: 98 F | RESPIRATION RATE: 20 BRPM | WEIGHT: 159 LBS | HEIGHT: 62 IN | DIASTOLIC BLOOD PRESSURE: 86 MMHG

## 2018-07-05 DIAGNOSIS — M47.816 LUMBAR SPONDYLOSIS: Primary | ICD-10-CM

## 2018-07-05 DIAGNOSIS — M79.7 FIBROMYALGIA: ICD-10-CM

## 2018-07-05 DIAGNOSIS — M70.62 GREATER TROCHANTERIC BURSITIS OF LEFT HIP: ICD-10-CM

## 2018-07-05 PROCEDURE — 99212 OFFICE O/P EST SF 10 MIN: CPT | Performed by: NURSE PRACTITIONER

## 2018-07-05 RX ORDER — GABAPENTIN 300 MG/1
CAPSULE ORAL
Qty: 180 CAPSULE | Refills: 1 | Status: SHIPPED | OUTPATIENT
Start: 2018-07-05 | End: 2019-01-04 | Stop reason: SDUPTHER

## 2018-07-05 NOTE — PROGRESS NOTES
Assessment:  1  Lumbar spondylosis    2  Fibromyalgia    3  Greater trochanteric bursitis of left hip        Plan:  Fredi Desai is a 67 y o  female  with a history of lumbar spondylosis, Sacroiliitis, and fibromyalgia  The patient continues with pain that is multifactorial nature  The patient's exam symptoms are consistent with left-sided greater trochanteric bursitis  The patient reports that she will undergo a left greater trochanteric bursa injection on Friday with Dr Tate  She reports that she is currently well managed for her residual neuropathic pain with the use of gabapentin 300 mg 2 tablets at bedtime  Therefore the patient will be continued on this medication  A prescription for gabapentin 300 mg 2 tablets at bedtime was sent electronically to the patient's pharmacy on file, 90 day supply, with 1 refill  The patient will follow up in 6 months, or sooner with the worsening of symptoms  My impressions and treatment recommendations were discussed in detail with the patient who verbalized understanding and had no further questions  Discharge instructions were provided  I personally saw and examined the patient and I agree with the above discussed plan of care  No orders of the defined types were placed in this encounter  No orders of the defined types were placed in this encounter  History of Present Illness:  Fredi Desai is a 67 y o  female  with a history of lumbar spondylosis, Sacroiliitis, and fibromyalgia  The patient was last seen office on 12/27/2017 where she was continued on gabapentin 300 mg 2 capsules at bedtime  She present for a follow up visit in regards to Arm Pain and Leg Pain  The patients current symptoms include bilateral leg, and bilateral arm pain  She reports continued dull aching pain in her bilateral forearms  She also complains of dull aching pain that radiates down the bilateral lateral aspects of her leg to her ankles   She denies bilateral leg weakness or bowel or bladder issues  The patient reports that she recently was seen by Orthopedics Dr Tate, and was diagnosed with left greater trochanteric bursitis, and will undergo a left greater trochanteric bursa injection on Friday  She reports that her pain as dull aching, shooting pain that occurs intermittently mostly during the evening hours  The patient reports that her pain is symptoms are unchanged since last visit  She currently rates her pain a 1/10 numeric pain scale  Current pain medications includes:  Gabapentin 300 mg 1 tablet 2 times daily  The patient reports that this regimen is providing 60% pain relief  The patient is reporting no side effects from this pain medication regimen  I have personally reviewed and/or updated the patient's past medical history, past surgical history, family history, social history, current medications, allergies, and vital signs today  Review of Systems   Respiratory: Negative for shortness of breath  Cardiovascular: Negative for chest pain  Gastrointestinal: Negative for constipation, diarrhea, nausea and vomiting  Musculoskeletal: Negative for arthralgias, gait problem, joint swelling and myalgias  Decreased ROM   Skin: Negative for rash  Neurological: Negative for dizziness, seizures and weakness  All other systems reviewed and are negative        Patient Active Problem List   Diagnosis    Atrial fibrillation with rapid ventricular response (HCC)    Essential hypertension    Dyslipidemia    Palpitations    Sacroiliitis (HCC)    Chronic pain of right knee    Primary osteoarthritis of right knee    Left hip pain    Trochanteric bursitis of left hip    Thumb pain, left    Effusion of right knee       Past Medical History:   Diagnosis Date    Actinic keratosis     last assessed - [id number]    Arthritis     Atrial fibrillation with rapid ventricular response ([facility] [address] )     last assessed - 26Apr2016    Basal cell carcinoma  Benign colon polyp     last assessed - [id number]    Effusion of knee joint right     Resolved - [id number]    Esophageal reflux     Fibromyalgia     last assessed - [id number]    Hypertension     Nummular eczema     last assessed - [id number]    Palpitations     last assessed - [id number]    Peroneal tendonitis, right     last assessed - [id number]    Rectal hemorrhage     last assessed - [id number]    Trochanteric bursitis, unspecified hip     last assessed - [id number]       Past Surgical History:   Procedure Laterality Date    CATARACT EXTRACTION      COLONOSCOPY  03/2018    EYE SURGERY      HYSTERECTOMY      JOINT REPLACEMENT      RECTAL POLYPECTOMY      REPLACEMENT TOTAL KNEE Left     last assessed - [id number]    TONSILLECTOMY      TOTAL ABDOMINAL HYSTERECTOMY      TUBAL LIGATION         Family History   Problem Relation Age of Onset    Heart disease Mother     Heart disease Father     Coronary artery disease Father     Stroke Father         cerebrovascular accident    Heart attack Father         myocardial infarction    Other Family         Back disorder    Coronary artery disease Family     Neuropathy Family     Osteoporosis Family        Social History     Occupational History    Not on file  Social History Main Topics    Smoking status: Never Smoker    Smokeless tobacco: Never Used    Alcohol use Yes      Comment: Social alcohol use - per [employer]    Drug use: No    Sexual activity: Not on file       Current Outpatient Prescriptions on File Prior to Visit   Medication Sig    amLODIPine (NORVASC) 2 5 mg tablet Take 1 tablet (2 5 mg total) by mouth daily    apixaban (ELIQUIS) 5 mg Take 1 tablet (5 mg total) by mouth 2 (two) times a day    B Complex-C-Folic Acid (B COMPLEX + C TR) TBCR Take by mouth daily   Cholecalciferol (CVS VITAMIN D) 2000 UNITS CAPS Take 2,000 Units by mouth daily   Chromium Picolinate POWD Take by mouth daily      ezetimibe (ZETIA) 10 mg tablet Zetia 10 MG Oral Tablet TAKE 1 TABLET DAILY  Quantity: 90;  Refills: 3    Renee PATRICIA MD;  Markel Phelps 16-Oct-2012 Active    gabapentin (NEURONTIN) 300 mg capsule Take 600 mg by mouth daily at bedtime Gabapentin 300 MG Oral Capsule TAKE ONE CAPSULE BY MOUTH THREE TIMES DAILY  Quantity: 90;  Refills: 1    Agustín Abarca MD;  Started 13-Jan-2016 Active     losartan (COZAAR) 100 MG tablet Losartan Potassium 100 MG Oral Tablet take 1 tablet by mouth once daily  Quantity: 90;  Refills: 3    Renee PATRICIA MD;  Markel Phelps 16-Oct-2011 Active    rOPINIRole (REQUIP) 0 25 mg tablet TAKE ONE TABLET BY MOUTH AT BEDTIME     sotalol (BETAPACE) 80 mg tablet TAKE 1 TABLET BY MOUTH  TWICE A DAY    traMADol (ULTRAM) 50 mg tablet TraMADol HCl - 50 MG Oral Tablet TAKE 1 TABLET TWICE DAILY AS NEEDED  Quantity: 180;  Refills: 1    Renee PATRICIA MD;  Markel Phelps 29-May-2013 Active    zolpidem (AMBIEN) 10 mg tablet TAKE ONE TABLET BY MOUTH AT BEDTIME AS NEEDED for sleep     No current facility-administered medications on file prior to visit  Allergies   Allergen Reactions    Ace Inhibitors     Penicillins        Physical Exam:    /86   Temp 98 °F (36 7 °C)   Resp 20   Ht 5' 2" (1 575 m)   Wt 72 1 kg (159 lb)   BMI 29 08 kg/m²     Constitutional:normal, well developed, well nourished, alert, in no distress and non-toxic and no overt pain behavior    Eyes:anicteric  HEENT:grossly intact  Neck:supple, symmetric, trachea midline and no masses   Pulmonary:even and unlabored  Cardiovascular:No edema or pitting edema present  Skin:Normal without rashes or lesions and well hydrated  Psychiatric:Mood and affect appropriate  Neurologic:Cranial Nerves II-XII grossly intact  Musculoskeletal:normal     Lumbar Spine Exam    Appearance:  Normal lordosis  Palpation/Tenderness:  left lumbar paraspinal tenderness  right lumbar paraspinal tenderness  Sensory:  no sensory deficits noted  Range of Motion:  Full range of motion with no pain or limitations in flexion, extension, lateral flexion and rotation  Motor Strength:  Left hip flexion:  5/5  Right hip flexion:  5/5  Left knee extension:  5/5  Right knee extension:  5/5  Left foot dorsiflexion:  5/5  Left foot plantar flexion:  5/5  Right foot dorsiflexion:  5/5  Right foot plantar flexion:  5/5      Imaging  MRI LUMBAR SPINE WITHOUT CONTRAST   INDICATION-  Right leg pain   COMPARISON-  None  TECHNIQUE-  Sagittal T1, sagittal T2, sagittal inversion recovery,   axial T1 and axial T2, coronal T2      IMAGE QUALITY-  Diagnostic   FINDINGS-   ALIGNMENT-  Right convex L  to 3 apex scoliosis  Leftward translation   of L1, rightward translation L3, asymmetric endplate changes to the   right L4-5  MARROW SIGNAL-  Normal marrow signal is identified within the   visualized bony structures  No discrete marrow lesion  DISTAL CORD AND CONUS-  Normal size and signal within the distal cord   and conus  The conus ends at the L1 level  PARASPINAL SOFT TISSUES-  Paraspinal soft tissues are unremarkable  SACRUM-  Normal signal within the sacrum  No evidence of insufficiency   or stress fracture  LOWER THORACIC DISC SPACES-  Normal disc height and signal   No disc   herniation, canal stenosis or foraminal narrowing  LUMBAR DISC SPACES-  Minor bulge   L1-L2-  Normal    L2-L3-  Minor bulge, slight facet arthrosis   L3-L4-  Disc extends asymmetrically into the left foramen  Rightward   translation of L3, No definite L3 root compression  L4-L5-  Asymmetric loss of disc height to the right, asymmetric facet   and endplate changes to the right  Moderate right foraminal stenosis  L5-S1-  Right greater than left facet arthrosis  Minor bulge   IMPRESSION-   Multilevel noncompressive degenerative changes    Right convex L2-3 apex   scoliosis with asymmetric degenerative disease

## 2018-07-06 ENCOUNTER — TELEPHONE (OUTPATIENT)
Dept: PREADMISSION TESTING | Facility: HOSPITAL | Age: 73
End: 2018-07-06

## 2018-07-06 ENCOUNTER — OFFICE VISIT (OUTPATIENT)
Dept: OBGYN CLINIC | Facility: MEDICAL CENTER | Age: 73
End: 2018-07-06
Payer: MEDICARE

## 2018-07-06 VITALS
BODY MASS INDEX: 29.08 KG/M2 | HEIGHT: 62 IN | WEIGHT: 158 LBS | HEART RATE: 51 BPM | DIASTOLIC BLOOD PRESSURE: 68 MMHG | SYSTOLIC BLOOD PRESSURE: 115 MMHG

## 2018-07-06 DIAGNOSIS — M17.11 PRIMARY OSTEOARTHRITIS OF RIGHT KNEE: Primary | ICD-10-CM

## 2018-07-06 DIAGNOSIS — G89.29 CHRONIC PAIN OF RIGHT KNEE: ICD-10-CM

## 2018-07-06 DIAGNOSIS — M17.11 ARTHRITIS OF RIGHT KNEE: Primary | ICD-10-CM

## 2018-07-06 DIAGNOSIS — M70.62 TROCHANTERIC BURSITIS OF LEFT HIP: ICD-10-CM

## 2018-07-06 DIAGNOSIS — M18.12 ARTHRITIS OF CARPOMETACARPAL (CMC) JOINT OF LEFT THUMB: ICD-10-CM

## 2018-07-06 DIAGNOSIS — M25.561 CHRONIC PAIN OF RIGHT KNEE: ICD-10-CM

## 2018-07-06 PROCEDURE — 20610 DRAIN/INJ JOINT/BURSA W/O US: CPT | Performed by: ORTHOPAEDIC SURGERY

## 2018-07-06 PROCEDURE — 99214 OFFICE O/P EST MOD 30 MIN: CPT | Performed by: ORTHOPAEDIC SURGERY

## 2018-07-06 RX ORDER — LIDOCAINE HYDROCHLORIDE 10 MG/ML
1 INJECTION, SOLUTION INFILTRATION; PERINEURAL
Status: COMPLETED | OUTPATIENT
Start: 2018-07-06 | End: 2018-07-06

## 2018-07-06 RX ORDER — LIDOCAINE HYDROCHLORIDE 10 MG/ML
2 INJECTION, SOLUTION INFILTRATION; PERINEURAL
Status: COMPLETED | OUTPATIENT
Start: 2018-07-06 | End: 2018-07-06

## 2018-07-06 RX ORDER — BUPIVACAINE HYDROCHLORIDE 2.5 MG/ML
1 INJECTION, SOLUTION EPIDURAL; INFILTRATION; INTRACAUDAL
Status: COMPLETED | OUTPATIENT
Start: 2018-07-06 | End: 2018-07-06

## 2018-07-06 RX ORDER — ACETAMINOPHEN 325 MG/1
975 TABLET ORAL ONCE
Status: CANCELLED | OUTPATIENT
Start: 2018-07-06 | End: 2018-07-06

## 2018-07-06 RX ORDER — BUPIVACAINE HYDROCHLORIDE 2.5 MG/ML
2 INJECTION, SOLUTION INFILTRATION; PERINEURAL
Status: COMPLETED | OUTPATIENT
Start: 2018-07-06 | End: 2018-07-06

## 2018-07-06 RX ORDER — BETAMETHASONE SODIUM PHOSPHATE AND BETAMETHASONE ACETATE 3; 3 MG/ML; MG/ML
12 INJECTION, SUSPENSION INTRA-ARTICULAR; INTRALESIONAL; INTRAMUSCULAR; SOFT TISSUE
Status: COMPLETED | OUTPATIENT
Start: 2018-07-06 | End: 2018-07-06

## 2018-07-06 RX ORDER — BETAMETHASONE SODIUM PHOSPHATE AND BETAMETHASONE ACETATE 3; 3 MG/ML; MG/ML
6 INJECTION, SUSPENSION INTRA-ARTICULAR; INTRALESIONAL; INTRAMUSCULAR; SOFT TISSUE
Status: COMPLETED | OUTPATIENT
Start: 2018-07-06 | End: 2018-07-06

## 2018-07-06 RX ORDER — GABAPENTIN 300 MG/1
300 CAPSULE ORAL ONCE
Status: CANCELLED | OUTPATIENT
Start: 2018-07-06 | End: 2018-07-06

## 2018-07-06 RX ADMIN — BETAMETHASONE SODIUM PHOSPHATE AND BETAMETHASONE ACETATE 12 MG: 3; 3 INJECTION, SUSPENSION INTRA-ARTICULAR; INTRALESIONAL; INTRAMUSCULAR; SOFT TISSUE at 09:10

## 2018-07-06 RX ADMIN — BUPIVACAINE HYDROCHLORIDE 1 ML: 2.5 INJECTION, SOLUTION EPIDURAL; INFILTRATION; INTRACAUDAL at 09:10

## 2018-07-06 RX ADMIN — BUPIVACAINE HYDROCHLORIDE 2 ML: 2.5 INJECTION, SOLUTION INFILTRATION; PERINEURAL at 09:10

## 2018-07-06 RX ADMIN — LIDOCAINE HYDROCHLORIDE 1 ML: 10 INJECTION, SOLUTION INFILTRATION; PERINEURAL at 09:10

## 2018-07-06 RX ADMIN — BETAMETHASONE SODIUM PHOSPHATE AND BETAMETHASONE ACETATE 6 MG: 3; 3 INJECTION, SUSPENSION INTRA-ARTICULAR; INTRALESIONAL; INTRAMUSCULAR; SOFT TISSUE at 09:10

## 2018-07-06 RX ADMIN — LIDOCAINE HYDROCHLORIDE 2 ML: 10 INJECTION, SOLUTION INFILTRATION; PERINEURAL at 09:10

## 2018-07-06 NOTE — PROGRESS NOTES
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, and left hip trochanteric bursitis  She is contemplating a right TKA for the fall as she had a left TKA in 2008 and responded very well to that TKA  At a previous visit her left 1st [facility] joint and left trochanteric bursa were injected with relief until recently as her pain at those areas have returned       [facility]    Physical exam  · General: Awake, Alert, Oriented  · Eyes: Pupils equal, round and reactive to light  · Heart: regular rate and rhythm  · Lungs: No audible wheezing  · Abdomen: soft    Right knee:  Skin intact good STLT  Varus alignment with bony enlargement medially  Mild swelling and trace effusion  TTP medial joint line  WNL ROM with patellar crepitus and pain at flexion  Mild laxity but no instability  NVID    Left Thumb:  Skin intact, good STLT, no triggering    + grind test, pain with circumduction  Good strength    Left hip:  Skin intact good STLT  TTP over her trochanteric flare  Good ROM and strenght  NVID      Imaging  None performed    Procedure  Large joint arthrocentesis  Date/Time: 7/6/2018 9:10 AM  Consent given by: patient  Site marked: site marked  Supporting Documentation  Indications: pain   Procedure Details  Location: knee - R knee  Preparation: Patient was prepped and draped in the usual sterile fashion  Needle size: 22 G  Ultrasound guidance: no  Medications administered: 2 mL bupivacaine 0 25 %; 2 mL lidocaine 1 %; 12 mg betamethasone acetate-betamethasone sodium phosphate 6 (3-3) mg/mL    Patient tolerance: patient tolerated the procedure well with no immediate complications  Dressing:  Sterile dressing applied  Large joint arthrocentesis  Date/Time: 7/6/2018 9:10 AM  Consent given by: patient  Site marked: site marked  Supporting Documentation  Indications: pain   Procedure Details  Location: hip - L greater trochanteric bursa  Preparation: Patient was prepped and draped in the usual sterile fashion  Needle size: 22 G  Ultrasound guidance: no  Approach: lateral  Medications administered: 2 mL bupivacaine 0 25 %; 2 mL lidocaine 1 %; 12 mg betamethasone acetate-betamethasone sodium phosphate 6 (3-3) mg/mL    Patient tolerance: patient tolerated the procedure well with no immediate complications  Dressing:  Sterile dressing applied  Hand/upper extremity injection  Date/Time: 7/6/2018 9:10 AM  Consent given by: patient  Site marked: site marked  Timeout: Immediately prior to procedure a time out was called to verify the correct patient, procedure, equipment, support staff and site/side marked as required   Supporting Documentation  Indications: diagnostic and pain   Procedure Details  Condition comment: 1st [facility] joint   Preparation: Patient was prepped and draped in the usual sterile fashion  Needle size: 22 G  Ultrasound guidance: no  Medications administered: 1 mL bupivacaine (PF) 0 25 %; 1 mL lidocaine 1 %; 6 mg betamethasone acetate-betamethasone sodium phosphate 6 (3-3) mg/mL  Patient tolerance: patient tolerated the procedure well with no immediate complications  Dressing:  Sterile dressing applied           Assessment/Plan:   67 y  o female with right knee OA and chronic pain, left trochanteric bursitis, and left 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

## 2018-07-23 DIAGNOSIS — M17.11 PRIMARY OSTEOARTHRITIS OF RIGHT KNEE: Primary | ICD-10-CM

## 2018-07-23 DIAGNOSIS — E78.00 HYPERCHOLESTEREMIA: Primary | ICD-10-CM

## 2018-07-23 DIAGNOSIS — M17.11 PRIMARY OSTEOARTHRITIS OF RIGHT KNEE: ICD-10-CM

## 2018-07-23 RX ORDER — EZETIMIBE 10 MG/1
TABLET ORAL
Qty: 90 TABLET | Refills: 3 | Status: SHIPPED | OUTPATIENT
Start: 2018-07-23 | End: 2019-09-18 | Stop reason: SDUPTHER

## 2018-07-23 RX ORDER — TRAMADOL HYDROCHLORIDE 50 MG/1
TABLET ORAL
Qty: 180 TABLET | Refills: 1 | Status: SHIPPED | OUTPATIENT
Start: 2018-07-23 | End: 2019-02-19 | Stop reason: SDUPTHER

## 2018-07-25 DIAGNOSIS — G47.01 INSOMNIA DUE TO MEDICAL CONDITION: ICD-10-CM

## 2018-07-25 RX ORDER — ZOLPIDEM TARTRATE 10 MG/1
10 TABLET ORAL
Qty: 23 TABLET | Refills: 3 | Status: SHIPPED | OUTPATIENT
Start: 2018-07-25 | End: 2018-11-28 | Stop reason: SDUPTHER

## 2018-08-03 ENCOUNTER — OFFICE VISIT (OUTPATIENT)
Dept: CARDIOLOGY CLINIC | Facility: CLINIC | Age: 73
End: 2018-08-03
Payer: MEDICARE

## 2018-08-03 VITALS
SYSTOLIC BLOOD PRESSURE: 140 MMHG | BODY MASS INDEX: 29.08 KG/M2 | DIASTOLIC BLOOD PRESSURE: 82 MMHG | HEIGHT: 62 IN | WEIGHT: 158 LBS | HEART RATE: 54 BPM

## 2018-08-03 DIAGNOSIS — I48.0 PAROXYSMAL ATRIAL FIBRILLATION (HCC): ICD-10-CM

## 2018-08-03 DIAGNOSIS — I10 ESSENTIAL (PRIMARY) HYPERTENSION: Primary | ICD-10-CM

## 2018-08-03 DIAGNOSIS — E78.00 PURE HYPERCHOLESTEROLEMIA: ICD-10-CM

## 2018-08-03 PROCEDURE — 99214 OFFICE O/P EST MOD 30 MIN: CPT | Performed by: INTERNAL MEDICINE

## 2018-08-03 PROCEDURE — 93000 ELECTROCARDIOGRAM COMPLETE: CPT | Performed by: INTERNAL MEDICINE

## 2018-08-03 NOTE — PROGRESS NOTES
Cardiology Follow Up    [facility]  1945  [id number]  [address] [facility] [address]    1  Essential (primary) hypertension     2  Pure hypercholesterolemia     3  Paroxysmal atrial fibrillation (HCC)         Interval History:  Patient is here for an unscheduled visit  She is a patient of Dr Mendy Singh  She is followed for paroxysmal atrial fibrillation  She was previously seen by Dr Wes Gill of our EP Department  She has been on sotalol  She had a nuclear stress test in June 2016 which showed no evidence of prognostically important ischemia  She had an echocardiogram in April 2016 which demonstrated preserved LV systolic function with mild left atrial cavity enlargement and no significant valvular heart disease  Recent blood work performed in April looked good  Her most recent TSH was done in 10/2016 and was within normal limits  Today she had an episode of right-sided chest discomfort  She was concerned about this and came into the office for an evaluation  Her EKG today is baseline demonstrating sinus bradycardia with nonspecific inferior T-wave changes with no change compared to a prior tracing done in April of this year  She has had no further issue  She is here today with her       Patient Active Problem List   Diagnosis    Atrial fibrillation with rapid ventricular response (HCC)    Essential hypertension    Dyslipidemia    Palpitations    Sacroiliitis (HCC)    Chronic pain of right knee    Primary osteoarthritis of right knee    Left hip pain    Trochanteric bursitis of left hip    Thumb pain, left    Effusion of right knee     Past Medical History:   Diagnosis Date    Actinic keratosis     last assessed - [id number]    Arthritis     Atrial fibrillation with rapid ventricular response ([address] )     last assessed - 26Apr2016    Basal cell carcinoma     Benign colon polyp     last assessed - [id number]    Effusion of knee joint right     Resolved - [id number]    Esophageal reflux     Fibromyalgia     last assessed - [id number]    Hypertension     Nummular eczema     last assessed - 19Ptk3494    Palpitations     last assessed - [id number]    Peroneal tendonitis, right     last assessed - 59NTP9165    Rectal hemorrhage     last assessed - [id number]    Trochanteric bursitis, unspecified hip     last assessed - [id number]     Social History     Social History    Marital status: /Civil Union     Spouse name: N/A    Number of children: N/A    Years of education: N/A     Occupational History    Not on file       Social History Main Topics    Smoking status: Never Smoker    Smokeless tobacco: Never Used    Alcohol use Yes      Comment: Social alcohol use - per [employer]    Drug use: No    Sexual activity: Not on file     Other Topics Concern    Not on file     Social History Narrative    No narrative on file      Family History   Problem Relation Age of Onset    Heart disease Mother     Heart disease Father     Coronary artery disease Father     Stroke Father         cerebrovascular accident    Heart attack Father         myocardial infarction    Other Family         Back disorder    Coronary artery disease Family     Neuropathy Family     Osteoporosis Family      Past Surgical History:   Procedure Laterality Date    CATARACT EXTRACTION      COLONOSCOPY  03/2018    EYE SURGERY      HYSTERECTOMY      JOINT REPLACEMENT      RECTAL POLYPECTOMY      REPLACEMENT TOTAL KNEE Left     last assessed - [id number]    TONSILLECTOMY      TOTAL ABDOMINAL HYSTERECTOMY      TUBAL LIGATION         Current Outpatient Prescriptions:     amLODIPine (NORVASC) 2 5 mg tablet, Take 1 tablet (2 5 mg total) by mouth daily, Disp: 90 tablet, Rfl: 3    apixaban (ELIQUIS) 5 mg, Take 1 tablet (5 mg total) by mouth 2 (two) times a day, Disp: 56 tablet, Rfl: 0    B Complex-C-Folic Acid (B COMPLEX + C TR) TBCR, Take by mouth daily  , Disp: , Rfl:     Cholecalciferol (CVS VITAMIN D) 2000 UNITS CAPS, Take 2,000 Units by mouth daily  , Disp: , Rfl:     Chromium Picolinate POWD, Take by mouth daily  , Disp: , Rfl:     ezetimibe (ZETIA) 10 mg tablet, TAKE 1 TABLET BY MOUTH  DAILY, Disp: 90 tablet, Rfl: 3    gabapentin (NEURONTIN) 300 mg capsule, 2 capsules at bedtime  , Disp: 180 capsule, Rfl: 1    losartan (COZAAR) 100 MG tablet, Losartan Potassium 100 MG Oral Tablet take 1 tablet by mouth once daily  Quantity: 90;  Refills: 3    [place] PATRICIA MD;  Started 16-Oct-2011 Active, Disp: , Rfl:     rOPINIRole (REQUIP) 0 25 mg tablet, TAKE ONE TABLET BY MOUTH AT BEDTIME , Disp: 30 tablet, Rfl: 5    sotalol (BETAPACE) 80 mg tablet, TAKE 1 TABLET BY MOUTH  TWICE A DAY, Disp: 180 tablet, Rfl: 3    traMADol (ULTRAM) 50 mg tablet, TAKE 1 TABLET BY MOUTH TWO  TIMES DAILY AS NEEDED, Disp: 180 tablet, Rfl: 1    zolpidem (AMBIEN) 10 mg tablet, Take 1 tablet (10 mg total) by mouth daily at bedtime as needed for sleep, Disp: 23 tablet, Rfl: 3  Allergies   Allergen Reactions    Ace Inhibitors     Penicillins        Labs:not applicable  Imaging: No results found  Review of Systems:  Review of Systems   Cardiovascular: Positive for palpitations  Physical Exam:  Physical Exam   Constitutional: She is oriented to person, place, and time  She appears well-developed and well-nourished  HENT:   Head: Normocephalic and atraumatic  Eyes: Conjunctivae and EOM are normal  Pupils are equal, round, and reactive to light  Neck: Normal range of motion  Neck supple  Cardiovascular: Normal rate and normal heart sounds  Pulmonary/Chest: Effort normal and breath sounds normal    Neurological: She is alert and oriented to person, place, and time  Skin: Skin is warm and dry  Psychiatric: She has a normal mood and affect     Vitals reviewed  Discussion/Summary:  For now I will continue her present medical regimen  I have asked her to call if she has any further discomfort and at that time I will likely order an ischemic heart disease workup  She will follow up with her primary cardiologist as per routine

## 2018-08-28 ENCOUNTER — TRANSCRIBE ORDERS (OUTPATIENT)
Dept: ADMINISTRATIVE | Facility: HOSPITAL | Age: 73
End: 2018-08-28

## 2018-08-28 DIAGNOSIS — M85.80 OSTEOPENIA, UNSPECIFIED LOCATION: ICD-10-CM

## 2018-08-28 DIAGNOSIS — Z12.31 ENCOUNTER FOR SCREENING MAMMOGRAM FOR MALIGNANT NEOPLASM OF BREAST: Primary | ICD-10-CM

## 2018-09-10 ENCOUNTER — OFFICE VISIT (OUTPATIENT)
Dept: CARDIOLOGY CLINIC | Facility: CLINIC | Age: 73
End: 2018-09-10
Payer: MEDICARE

## 2018-09-10 VITALS
HEART RATE: 64 BPM | SYSTOLIC BLOOD PRESSURE: 120 MMHG | DIASTOLIC BLOOD PRESSURE: 70 MMHG | BODY MASS INDEX: 29.5 KG/M2 | OXYGEN SATURATION: 97 % | WEIGHT: 160.3 LBS | HEIGHT: 62 IN

## 2018-09-10 DIAGNOSIS — R00.2 PALPITATIONS: ICD-10-CM

## 2018-09-10 DIAGNOSIS — E78.5 DYSLIPIDEMIA: ICD-10-CM

## 2018-09-10 DIAGNOSIS — I10 ESSENTIAL HYPERTENSION: ICD-10-CM

## 2018-09-10 DIAGNOSIS — I48.91 ATRIAL FIBRILLATION WITH RAPID VENTRICULAR RESPONSE (HCC): Primary | ICD-10-CM

## 2018-09-10 PROCEDURE — 93000 ELECTROCARDIOGRAM COMPLETE: CPT | Performed by: INTERNAL MEDICINE

## 2018-09-10 PROCEDURE — 99214 OFFICE O/P EST MOD 30 MIN: CPT | Performed by: INTERNAL MEDICINE

## 2018-09-10 NOTE — PROGRESS NOTES
Cardiology Follow Up    [place]  1945  [id number]  [address] [facility] [address]  [place] [facility] [address]    1  Atrial fibrillation with rapid ventricular response (HCC)  POCT ECG   2  Essential hypertension     3  Dyslipidemia     4  Palpitations         Discussion/Summary:  Overall she has been doing well from a cardiac standpoint  She gets an occasional palpitation this has been mild and short lived  From a cardiac standpoint she is moderate risk for the operating room  No further preoperative testing is required  Resume anticoagulation postoperatively  AFib has been controlled by sotalol  Blood pressures been well controlled  She is not due for any cardiac testing  Interval History:  26-year-old female with a history of hypertension, hyperlipidemia, paroxysmal atrial fibrillation presents for routine follow-up visit and preoperative risk assessment  Functional capacity has been good  Greater than 4 Mets  Denies any chest pain, shortness of breath, palpitations, lightheadedness, dizziness, or syncope  She has been taking all medications as prescribed  Sotalol is doing a good job of suppressing the atrial fibrillation  No adverse bleeding events on anticoagulant  She needs upcoming knee replacement surgery                                             Problem List     Atrial fibrillation with rapid ventricular response (HCC)    Essential hypertension    Dyslipidemia    Palpitations    Sacroiliitis (HCC)    Chronic pain of right knee    Primary osteoarthritis of right knee    Left hip pain    Trochanteric bursitis of left hip    Thumb pain, left        Past Medical History:   Diagnosis Date    Actinic keratosis     last assessed - [id number]    Arthritis     Atrial fibrillation with rapid ventricular response ([facility] )     last assessed - 26Apr2016    Basal cell carcinoma     Benign colon polyp     last assessed - [id number]    Effusion of knee joint right     Resolved - 04Vyc3019    Esophageal reflux     Fibromyalgia     last assessed - 26Vjt8810    Hypertension     Nummular eczema     last assessed - 48Ess8532    Palpitations     last assessed - 30Rix0512    Peroneal tendonitis, right     last assessed - 48QEO8117    Rectal hemorrhage     last assessed - 44JYJ8659    Trochanteric bursitis, unspecified hip     last assessed - 87MWR3506     Social History     Social History    Marital status: /Civil Union     Spouse name: N/A    Number of children: N/A    Years of education: N/A     Occupational History    Not on file       Social History Main Topics    Smoking status: Never Smoker    Smokeless tobacco: Never Used    Alcohol use Yes      Comment: Social alcohol use - per [employer]scripts    Drug use: No    Sexual activity: Not on file     Other Topics Concern    Not on file     Social History Narrative    No narrative on file      Family History   Problem Relation Age of Onset    Heart disease Mother     Heart disease Father     Coronary artery disease Father     Stroke Father         cerebrovascular accident    Heart attack Father         myocardial infarction   Roselee Peyton Sudden death Father         scd    Other Family         Back disorder    Coronary artery disease Family     Neuropathy Family     Osteoporosis Family     Anuerysm Neg Hx     Clotting disorder Neg Hx     Arrhythmia Neg Hx     Heart failure Neg Hx      Past Surgical History:   Procedure Laterality Date    CATARACT EXTRACTION      COLONOSCOPY  03/2018    EYE SURGERY      HYSTERECTOMY      JOINT REPLACEMENT      RECTAL POLYPECTOMY      REPLACEMENT TOTAL KNEE Left     last assessed - [id number]    TONSILLECTOMY      TOTAL ABDOMINAL HYSTERECTOMY      TUBAL LIGATION         Current Outpatient Prescriptions:     amLODIPine (NORVASC) 2 5 mg tablet, Take 1 tablet (2 5 mg total) by mouth daily, Disp: 90 tablet, Rfl: 3    apixaban (ELIQUIS) 5 mg, Take 1 tablet (5 mg total) by mouth 2 (two) times a day, Disp: 56 tablet, Rfl: 0    B Complex-C-Folic Acid (B COMPLEX + C TR) TBCR, Take by mouth daily  , Disp: , Rfl:     Cholecalciferol (CVS VITAMIN D) 2000 UNITS CAPS, Take 2,000 Units by mouth daily  , Disp: , Rfl:     Chromium Picolinate POWD, Take by mouth daily  , Disp: , Rfl:     ezetimibe (ZETIA) 10 mg tablet, TAKE 1 TABLET BY MOUTH  DAILY, Disp: 90 tablet, Rfl: 3    gabapentin (NEURONTIN) 300 mg capsule, 2 capsules at bedtime  , Disp: 180 capsule, Rfl: 1    losartan (COZAAR) 100 MG tablet, Losartan Potassium 100 MG Oral Tablet take 1 tablet by mouth once daily  Quantity: 90;  Refills: 3    Renee PATRICIA MD;  Started 16-Oct-2011 Active, Disp: , Rfl:     rOPINIRole (REQUIP) 0 25 mg tablet, TAKE ONE TABLET BY MOUTH AT BEDTIME , Disp: 30 tablet, Rfl: 5    sotalol (BETAPACE) 80 mg tablet, TAKE 1 TABLET BY MOUTH  TWICE A DAY, Disp: 180 tablet, Rfl: 3    traMADol (ULTRAM) 50 mg tablet, TAKE 1 TABLET BY MOUTH TWO  TIMES DAILY AS NEEDED, Disp: 180 tablet, Rfl: 1    zolpidem (AMBIEN) 10 mg tablet, Take 1 tablet (10 mg total) by mouth daily at bedtime as needed for sleep, Disp: 23 tablet, Rfl: 3  Allergies   Allergen Reactions    Penicillins Other (See Comments)     As a child calcium deposit in the arm     Ace Inhibitors GI Intolerance     Did feel well on it       Labs:     Chemistry        Component Value Date/Time     04/25/2018 0738     05/06/2015 1116    K 4 3 04/25/2018 0738    K 4 8 05/06/2015 1116     04/25/2018 0738    CL 99 (L) 05/06/2015 1116    CO2 29 04/25/2018 0738    CO2 28 05/06/2015 1116    BUN 20 04/25/2018 0738    BUN 19 05/06/2015 1116    CREATININE 0 92 04/25/2018 0738    CREATININE 0 97 05/06/2015 1116        Component Value Date/Time    CALCIUM 9 3 04/25/2018 0738    CALCIUM 9 0 05/06/2015 1116    ALKPHOS 62 04/25/2018 0738    ALKPHOS 60 05/06/2015 1116    AST 20 04/25/2018 0738    AST 28 05/06/2015 1116    ALT 27 04/25/2018 0738    ALT 34 05/06/2015 1116    BILITOT 0 78 05/06/2015 1116            Lab Results   Component Value Date    CHOL 205 05/06/2015    CHOL 195 07/10/2014     Lab Results   Component Value Date    HDL 58 04/25/2018    HDL 50 04/21/2016    HDL 53 05/06/2015     Lab Results   Component Value Date    LDLCALC 90 04/25/2018    LDLCALC 104 (H) 04/21/2016    LDLCALC 134 (H) 05/06/2015     Lab Results   Component Value Date    TRIG 76 04/25/2018    TRIG 89 04/21/2016    TRIG 90 05/06/2015     No components found for: CHOLHDL    Imaging: No results found  ECG:  Normal sinus rhythm      Review of Systems   Constitution: Negative  HENT: Negative  Eyes: Negative  Cardiovascular: Negative  Respiratory: Negative  Endocrine: Negative  Hematologic/Lymphatic: Negative  Skin: Negative  Musculoskeletal: Negative  Gastrointestinal: Negative  Genitourinary: Negative  Neurological: Negative  Psychiatric/Behavioral: Negative  Vitals:    09/10/18 1047   BP: 120/70   Pulse: 64   SpO2: 97%     Vitals:    09/10/18 1047   Weight: 72 7 kg (160 lb 4 8 oz)     Height: 5' 2" (157 5 cm)   Body mass index is 29 32 kg/m²  Physical Exam:  Vital signs reviewed    General appearance:  Appears stated age, alert, well appearing and in no distress  HEENT:  PERRLA, EOMI, no scleral icterus, no conjunctival pallor  NECK:  Supple, No elevated JVP, no thyromegaly, no carotid bruits  HEART:  Regular rate and rhythm, normal S1/S2, no S3/S4, no murmur or rub  LUNGS:  Clear to auscultation bilaterally, no wheezes rales or rhonchi  ABDOMEN:  Soft, non-tender, positive bowel sounds, no rebound or guarding, no organomegaly   EXTREMITIES:  No edema, normal range of motion  VASCULAR:  Normal pedal pulses, good pulse volume   SKIN: No lesions or rashes on exposed skin  NEURO:  CN II-XII intact, no focal deficits

## 2018-09-10 NOTE — LETTER
Cardiology Pre Operative Clearance      PRE OPERATIVE CARDIAC RISK ASSESSMENT    09/10/18    [facility]  1945  [id number]    Date of Surgery: 10/08/2018    Type of Surgery:Right Total knee replacement     Surgeon: Evalyn Collet, MD    No Cardiac Contraindication for Planned Surgical Procedures    Anticoagulation: yes, eliquis 5 mg twice daily    Physician Comment:   Moderate CV risk no further preoperative testing is required  Can hold Eliquis 2-3 days prior to surgery and resume postop      Electronically Signed: Parvin Osborne

## 2018-09-11 ENCOUNTER — TRANSCRIBE ORDERS (OUTPATIENT)
Dept: LAB | Facility: HOSPITAL | Age: 73
End: 2018-09-11

## 2018-09-11 ENCOUNTER — HOSPITAL ENCOUNTER (OUTPATIENT)
Dept: RADIOLOGY | Facility: HOSPITAL | Age: 73
Discharge: HOME | End: 2018-09-11

## 2018-09-11 ENCOUNTER — APPOINTMENT (OUTPATIENT)
Dept: LAB | Facility: HOSPITAL | Age: 73
End: 2018-09-11
Attending: ORTHOPAEDIC SURGERY
Payer: MEDICARE

## 2018-09-11 DIAGNOSIS — G89.29 CHRONIC PAIN OF RIGHT KNEE: ICD-10-CM

## 2018-09-11 DIAGNOSIS — M25.561 CHRONIC PAIN OF RIGHT KNEE: ICD-10-CM

## 2018-09-11 DIAGNOSIS — M17.11 PRIMARY OSTEOARTHRITIS OF RIGHT KNEE: ICD-10-CM

## 2018-09-11 DIAGNOSIS — M17.11 ARTHRITIS OF RIGHT KNEE: ICD-10-CM

## 2018-09-11 DIAGNOSIS — M17.11 OSTEOARTHRITIS OF RIGHT KNEE, UNSPECIFIED OSTEOARTHRITIS TYPE: Primary | ICD-10-CM

## 2018-09-11 LAB
ABO GROUP BLD: NORMAL
ALBUMIN SERPL BCP-MCNC: 3.5 G/DL (ref 3.5–5)
ALP SERPL-CCNC: 58 U/L (ref 46–116)
ALT SERPL W P-5'-P-CCNC: 29 U/L (ref 12–78)
ANION GAP SERPL CALCULATED.3IONS-SCNC: 6 MMOL/L (ref 4–13)
APTT PPP: 31 SECONDS (ref 24–36)
AST SERPL W P-5'-P-CCNC: 21 U/L (ref 5–45)
BASOPHILS # BLD AUTO: 0.05 THOUSANDS/ΜL (ref 0–0.1)
BASOPHILS NFR BLD AUTO: 1 % (ref 0–1)
BILIRUB SERPL-MCNC: 0.83 MG/DL (ref 0.2–1)
BLD GP AB SCN SERPL QL: NEGATIVE
BUN SERPL-MCNC: 19 MG/DL (ref 5–25)
CALCIUM SERPL-MCNC: 9.5 MG/DL (ref 8.3–10.1)
CHLORIDE SERPL-SCNC: 103 MMOL/L (ref 100–108)
CO2 SERPL-SCNC: 29 MMOL/L (ref 21–32)
CREAT SERPL-MCNC: 0.86 MG/DL (ref 0.6–1.3)
EOSINOPHIL # BLD AUTO: 0.16 THOUSAND/ΜL (ref 0–0.61)
EOSINOPHIL NFR BLD AUTO: 3 % (ref 0–6)
ERYTHROCYTE [DISTWIDTH] IN BLOOD BY AUTOMATED COUNT: 12.7 % (ref 11.6–15.1)
EST. AVERAGE GLUCOSE BLD GHB EST-MCNC: 140 MG/DL
GFR SERPL CREATININE-BSD FRML MDRD: 67 ML/MIN/1.73SQ M
GLUCOSE SERPL-MCNC: 115 MG/DL (ref 65–140)
HBA1C MFR BLD: 6.5 % (ref 4.2–6.3)
HCT VFR BLD AUTO: 40 % (ref 34.8–46.1)
HGB BLD-MCNC: 13 G/DL (ref 11.5–15.4)
IMM GRANULOCYTES # BLD AUTO: 0.01 THOUSAND/UL (ref 0–0.2)
IMM GRANULOCYTES NFR BLD AUTO: 0 % (ref 0–2)
INR PPP: 1.15 (ref 0.86–1.17)
LYMPHOCYTES # BLD AUTO: 1.94 THOUSANDS/ΜL (ref 0.6–4.47)
LYMPHOCYTES NFR BLD AUTO: 30 % (ref 14–44)
MCH RBC QN AUTO: 30.7 PG (ref 26.8–34.3)
MCHC RBC AUTO-ENTMCNC: 32.5 G/DL (ref 31.4–37.4)
MCV RBC AUTO: 95 FL (ref 82–98)
MONOCYTES # BLD AUTO: 0.57 THOUSAND/ΜL (ref 0.17–1.22)
MONOCYTES NFR BLD AUTO: 9 % (ref 4–12)
NEUTROPHILS # BLD AUTO: 3.76 THOUSANDS/ΜL (ref 1.85–7.62)
NEUTS SEG NFR BLD AUTO: 57 % (ref 43–75)
NRBC BLD AUTO-RTO: 0 /100 WBCS
PLATELET # BLD AUTO: 248 THOUSANDS/UL (ref 149–390)
PMV BLD AUTO: 10.5 FL (ref 8.9–12.7)
POTASSIUM SERPL-SCNC: 4.5 MMOL/L (ref 3.5–5.3)
PROT SERPL-MCNC: 7.3 G/DL (ref 6.4–8.2)
PROTHROMBIN TIME: 14.8 SECONDS (ref 11.8–14.2)
RBC # BLD AUTO: 4.23 MILLION/UL (ref 3.81–5.12)
RH BLD: POSITIVE
SODIUM SERPL-SCNC: 138 MMOL/L (ref 136–145)
SPECIMEN EXPIRATION DATE: NORMAL
WBC # BLD AUTO: 6.49 THOUSAND/UL (ref 4.31–10.16)

## 2018-09-11 PROCEDURE — 85730 THROMBOPLASTIN TIME PARTIAL: CPT

## 2018-09-11 PROCEDURE — 85610 PROTHROMBIN TIME: CPT

## 2018-09-11 PROCEDURE — 36415 COLL VENOUS BLD VENIPUNCTURE: CPT

## 2018-09-11 PROCEDURE — 86900 BLOOD TYPING SEROLOGIC ABO: CPT

## 2018-09-11 PROCEDURE — 80053 COMPREHEN METABOLIC PANEL: CPT

## 2018-09-11 PROCEDURE — 83036 HEMOGLOBIN GLYCOSYLATED A1C: CPT

## 2018-09-11 PROCEDURE — 86850 RBC ANTIBODY SCREEN: CPT

## 2018-09-11 PROCEDURE — 71046 X-RAY EXAM CHEST 2 VIEWS: CPT

## 2018-09-11 PROCEDURE — 85025 COMPLETE CBC W/AUTO DIFF WBC: CPT

## 2018-09-11 PROCEDURE — 86901 BLOOD TYPING SEROLOGIC RH(D): CPT

## 2018-09-12 ENCOUNTER — TELEPHONE (OUTPATIENT)
Dept: OBGYN CLINIC | Facility: MEDICAL CENTER | Age: 73
End: 2018-09-12

## 2018-09-12 NOTE — TELEPHONE ENCOUNTER
Call from Navdeep Sanchez, [address] [facility]   Call back # [phone]  Arvind Starks would like to know if patient is supposed to take both Lovenox and Eliquis together or was the patient to stop the Eliquis?

## 2018-09-12 NOTE — TELEPHONE ENCOUNTER
This question may best be answered by her cardiologist or her internal medicine doctor  Please call patient and ask her to reach out to the doctor who prescribes Eliquis for her    Thank you

## 2018-09-14 ENCOUNTER — HOSPITAL ENCOUNTER (OUTPATIENT)
Dept: RADIOLOGY | Facility: MEDICAL CENTER | Age: 73
Discharge: HOME | DRG: 310 | End: 2018-09-14
Payer: MEDICARE

## 2018-09-14 DIAGNOSIS — M85.80 OSTEOPENIA, UNSPECIFIED LOCATION: ICD-10-CM

## 2018-09-14 DIAGNOSIS — Z12.31 ENCOUNTER FOR SCREENING MAMMOGRAM FOR MALIGNANT NEOPLASM OF BREAST: ICD-10-CM

## 2018-09-14 PROCEDURE — 77067 SCR MAMMO BI INCL CAD: CPT

## 2018-09-14 PROCEDURE — 77080 DXA BONE DENSITY AXIAL: CPT

## 2018-09-15 ENCOUNTER — APPOINTMENT (EMERGENCY)
Dept: RADIOLOGY | Facility: HOSPITAL | Age: 73
DRG: 310 | End: 2018-09-15
Payer: MEDICARE

## 2018-09-15 ENCOUNTER — HOSPITAL ENCOUNTER (INPATIENT)
Facility: HOSPITAL | Age: 73
LOS: 1 days | Discharge: HOME | DRG: 310 | End: 2018-09-17
Attending: EMERGENCY MEDICINE | Admitting: HOSPITALIST
Payer: MEDICARE

## 2018-09-15 DIAGNOSIS — I48.91 ATRIAL FIBRILLATION, RAPID (HCC): Primary | ICD-10-CM

## 2018-09-15 DIAGNOSIS — R07.89 CHEST PRESSURE: ICD-10-CM

## 2018-09-15 PROBLEM — R07.9 CHEST PAIN: Status: ACTIVE | Noted: 2018-09-15

## 2018-09-15 LAB
ANION GAP SERPL CALCULATED.3IONS-SCNC: 10 MMOL/L (ref 4–13)
BASOPHILS # BLD AUTO: 0.05 THOUSANDS/ΜL (ref 0–0.1)
BASOPHILS NFR BLD AUTO: 1 % (ref 0–1)
BUN SERPL-MCNC: 20 MG/DL (ref 5–25)
CALCIUM SERPL-MCNC: 9.5 MG/DL (ref 8.3–10.1)
CHLORIDE SERPL-SCNC: 103 MMOL/L (ref 100–108)
CO2 SERPL-SCNC: 26 MMOL/L (ref 21–32)
CREAT SERPL-MCNC: 0.98 MG/DL (ref 0.6–1.3)
EOSINOPHIL # BLD AUTO: 0.15 THOUSAND/ΜL (ref 0–0.61)
EOSINOPHIL NFR BLD AUTO: 2 % (ref 0–6)
ERYTHROCYTE [DISTWIDTH] IN BLOOD BY AUTOMATED COUNT: 12.8 % (ref 11.6–15.1)
GFR SERPL CREATININE-BSD FRML MDRD: 57 ML/MIN/1.73SQ M
GLUCOSE SERPL-MCNC: 141 MG/DL (ref 65–140)
HCT VFR BLD AUTO: 42.1 % (ref 34.8–46.1)
HGB BLD-MCNC: 14.2 G/DL (ref 11.5–15.4)
IMM GRANULOCYTES # BLD AUTO: 0.03 THOUSAND/UL (ref 0–0.2)
IMM GRANULOCYTES NFR BLD AUTO: 0 % (ref 0–2)
LYMPHOCYTES # BLD AUTO: 2.01 THOUSANDS/ΜL (ref 0.6–4.47)
LYMPHOCYTES NFR BLD AUTO: 29 % (ref 14–44)
MCH RBC QN AUTO: 31.2 PG (ref 26.8–34.3)
MCHC RBC AUTO-ENTMCNC: 33.7 G/DL (ref 31.4–37.4)
MCV RBC AUTO: 93 FL (ref 82–98)
MONOCYTES # BLD AUTO: 0.62 THOUSAND/ΜL (ref 0.17–1.22)
MONOCYTES NFR BLD AUTO: 9 % (ref 4–12)
NEUTROPHILS # BLD AUTO: 3.98 THOUSANDS/ΜL (ref 1.85–7.62)
NEUTS SEG NFR BLD AUTO: 59 % (ref 43–75)
NRBC BLD AUTO-RTO: 0 /100 WBCS
NT-PROBNP SERPL-MCNC: 2951 PG/ML
PLATELET # BLD AUTO: 263 THOUSANDS/UL (ref 149–390)
PMV BLD AUTO: 9.9 FL (ref 8.9–12.7)
POTASSIUM SERPL-SCNC: 4.1 MMOL/L (ref 3.5–5.3)
RBC # BLD AUTO: 4.55 MILLION/UL (ref 3.81–5.12)
SODIUM SERPL-SCNC: 139 MMOL/L (ref 136–145)
TROPONIN I SERPL-MCNC: <0.02 NG/ML
TSH SERPL DL<=0.05 MIU/L-ACNC: 1.08 UIU/ML (ref 0.36–3.74)
WBC # BLD AUTO: 6.84 THOUSAND/UL (ref 4.31–10.16)

## 2018-09-15 PROCEDURE — 83880 ASSAY OF NATRIURETIC PEPTIDE: CPT | Performed by: EMERGENCY MEDICINE

## 2018-09-15 PROCEDURE — 93005 ELECTROCARDIOGRAM TRACING: CPT

## 2018-09-15 PROCEDURE — 71046 X-RAY EXAM CHEST 2 VIEWS: CPT

## 2018-09-15 PROCEDURE — 36415 COLL VENOUS BLD VENIPUNCTURE: CPT | Performed by: EMERGENCY MEDICINE

## 2018-09-15 PROCEDURE — 96374 THER/PROPH/DIAG INJ IV PUSH: CPT

## 2018-09-15 PROCEDURE — 84484 ASSAY OF TROPONIN QUANT: CPT | Performed by: EMERGENCY MEDICINE

## 2018-09-15 PROCEDURE — 99220 PR INITIAL OBSERVATION CARE/DAY 70 MINUTES: CPT | Performed by: HOSPITALIST

## 2018-09-15 PROCEDURE — 99285 EMERGENCY DEPT VISIT HI MDM: CPT

## 2018-09-15 PROCEDURE — 80048 BASIC METABOLIC PNL TOTAL CA: CPT | Performed by: EMERGENCY MEDICINE

## 2018-09-15 PROCEDURE — 85025 COMPLETE CBC W/AUTO DIFF WBC: CPT | Performed by: EMERGENCY MEDICINE

## 2018-09-15 PROCEDURE — 84484 ASSAY OF TROPONIN QUANT: CPT | Performed by: HOSPITALIST

## 2018-09-15 PROCEDURE — 84443 ASSAY THYROID STIM HORMONE: CPT | Performed by: EMERGENCY MEDICINE

## 2018-09-15 RX ORDER — LANOLIN ALCOHOL/MO/W.PET/CERES
400 CREAM (GRAM) TOPICAL DAILY
Status: DISCONTINUED | OUTPATIENT
Start: 2018-09-15 | End: 2018-09-17 | Stop reason: HOSPADM

## 2018-09-15 RX ORDER — GABAPENTIN 300 MG/1
600 CAPSULE ORAL
Status: DISCONTINUED | OUTPATIENT
Start: 2018-09-15 | End: 2018-09-17 | Stop reason: HOSPADM

## 2018-09-15 RX ORDER — EZETIMIBE 10 MG/1
10 TABLET ORAL DAILY
Status: DISCONTINUED | OUTPATIENT
Start: 2018-09-15 | End: 2018-09-17 | Stop reason: HOSPADM

## 2018-09-15 RX ORDER — ASCORBIC ACID 500 MG
500 TABLET ORAL 2 TIMES DAILY
Status: DISCONTINUED | OUTPATIENT
Start: 2018-09-15 | End: 2018-09-17 | Stop reason: HOSPADM

## 2018-09-15 RX ORDER — METOPROLOL TARTRATE 5 MG/5ML
5 INJECTION INTRAVENOUS ONCE
Status: DISCONTINUED | OUTPATIENT
Start: 2018-09-15 | End: 2018-09-15

## 2018-09-15 RX ORDER — LOSARTAN POTASSIUM 50 MG/1
100 TABLET ORAL DAILY
Status: DISCONTINUED | OUTPATIENT
Start: 2018-09-16 | End: 2018-09-17 | Stop reason: HOSPADM

## 2018-09-15 RX ORDER — DOXYCYCLINE HYCLATE 50 MG/1
324 CAPSULE, GELATIN COATED ORAL 2 TIMES DAILY
Status: DISCONTINUED | OUTPATIENT
Start: 2018-09-15 | End: 2018-09-17 | Stop reason: HOSPADM

## 2018-09-15 RX ORDER — METOPROLOL TARTRATE 5 MG/5ML
5 INJECTION INTRAVENOUS ONCE
Status: COMPLETED | OUTPATIENT
Start: 2018-09-15 | End: 2018-09-15

## 2018-09-15 RX ORDER — ROPINIROLE 0.25 MG/1
0.25 TABLET, FILM COATED ORAL
Status: DISCONTINUED | OUTPATIENT
Start: 2018-09-15 | End: 2018-09-17 | Stop reason: HOSPADM

## 2018-09-15 RX ORDER — AMLODIPINE BESYLATE 2.5 MG/1
2.5 TABLET ORAL DAILY
Status: DISCONTINUED | OUTPATIENT
Start: 2018-09-15 | End: 2018-09-17 | Stop reason: HOSPADM

## 2018-09-15 RX ORDER — TRAMADOL HYDROCHLORIDE 50 MG/1
50 TABLET ORAL EVERY 6 HOURS PRN
Status: DISCONTINUED | OUTPATIENT
Start: 2018-09-15 | End: 2018-09-17 | Stop reason: HOSPADM

## 2018-09-15 RX ORDER — ASPIRIN 81 MG/1
324 TABLET, CHEWABLE ORAL ONCE
Status: COMPLETED | OUTPATIENT
Start: 2018-09-15 | End: 2018-09-15

## 2018-09-15 RX ORDER — SOTALOL HYDROCHLORIDE 80 MG/1
80 TABLET ORAL 2 TIMES DAILY
Status: DISCONTINUED | OUTPATIENT
Start: 2018-09-15 | End: 2018-09-16

## 2018-09-15 RX ORDER — ZOLPIDEM TARTRATE 5 MG/1
10 TABLET ORAL
Status: DISCONTINUED | OUTPATIENT
Start: 2018-09-15 | End: 2018-09-17 | Stop reason: HOSPADM

## 2018-09-15 RX ADMIN — GABAPENTIN 600 MG: 300 CAPSULE ORAL at 21:06

## 2018-09-15 RX ADMIN — SODIUM CHLORIDE 500 ML: 0.9 INJECTION, SOLUTION INTRAVENOUS at 10:39

## 2018-09-15 RX ADMIN — ROPINIROLE 0.25 MG: 0.25 TABLET, FILM COATED ORAL at 21:06

## 2018-09-15 RX ADMIN — ZOLPIDEM TARTRATE 10 MG: 5 TABLET ORAL at 21:06

## 2018-09-15 RX ADMIN — APIXABAN 5 MG: 5 TABLET, FILM COATED ORAL at 17:02

## 2018-09-15 RX ADMIN — SOTALOL HYDROCHLORIDE 80 MG: 80 TABLET ORAL at 17:03

## 2018-09-15 RX ADMIN — OXYCODONE HYDROCHLORIDE AND ACETAMINOPHEN 500 MG: 500 TABLET ORAL at 17:03

## 2018-09-15 RX ADMIN — FERROUS GLUCONATE TAB 324 MG (37.5 MG ELEMENTAL IRON) 324 MG: 324 (37.5 FE) TAB at 17:02

## 2018-09-15 RX ADMIN — ASPIRIN 81 MG 324 MG: 81 TABLET ORAL at 11:23

## 2018-09-15 RX ADMIN — METOPROLOL TARTRATE 5 MG: 1 INJECTION, SOLUTION INTRAVENOUS at 10:40

## 2018-09-15 NOTE — Clinical Note
Case was discussed with Dr Nidia Villagomez and the patient's admission status was agreed to be Admission Status: observation status to the service of Dr Nidia Villagomez

## 2018-09-15 NOTE — ED PROVIDER NOTES
History  Chief Complaint   Patient presents with    Rapid Heart Rate     States she feels like her heart is racing  Has a previous history of A fib w maintence medication  Just saw cardiology for routine visit last week and he said all was "fine " Rapid heart beat started on thrusday  History provided by:  Patient and spouse  Rapid Heart Rate   Palpitations quality:  Irregular  Onset quality:  Gradual  Duration:  2 days  Timing:  Intermittent  Progression:  Worsening  Chronicity:  New  Context comment:  History of atrial fibrillation feels like rapid atrial fibrillation  Associated with some chest pressure  Relieved by:  Nothing  Exacerbated by: Exertion  Ineffective treatments:  None tried  Associated symptoms: chest pressure ( Denies of pain, describes it more as a pressure but does feel a when she is having palpitations and with exertion )    Associated symptoms: no chest pain, no cough, no diaphoresis, no dizziness, no nausea, no numbness, no shortness of breath and no vomiting    Risk factors: hx of atrial fibrillation        Prior to Admission Medications   Prescriptions Last Dose Informant Patient Reported? Taking? ADVANCED EVENING PRIMROSE OIL PO   Yes No   Sig: Take by mouth   B Complex-C-Folic Acid (B COMPLEX + C TR) TBCR  Self Yes No   Sig: Take by mouth daily  Black Cohosh 20 MG TABS   Yes No   Sig: Take by mouth   Cholecalciferol ([employer] VITAMIN D) 2000 UNITS CAPS  Self Yes No   Sig: Take 2,000 Units by mouth daily  Chromium Picolinate POWD  Self Yes No   Sig: Take by mouth daily     amLODIPine (NORVASC) 2 5 mg tablet  Self No No   Sig: Take 1 tablet (2 5 mg total) by mouth daily   apixaban (ELIQUIS) 5 mg  Self No No   Sig: Take 1 tablet (5 mg total) by mouth 2 (two) times a day   ascorbic acid (VITAMIN C) 500 mg tablet   Yes No   Sig: Take 500 mg by mouth 2 (two) times a day   ezetimibe (ZETIA) 10 mg tablet  Self No No   Sig: TAKE 1 TABLET BY MOUTH  DAILY   ferrous gluconate (FERGON) 324 mg tablet   Yes No   Sig: Take 324 mg by mouth 2 (two) times a day   folic acid (FOLVITE) 330 MCG tablet   Yes No   Sig: Take 400 mcg by mouth daily   gabapentin (NEURONTIN) 300 mg capsule  Self No No   Si capsules at bedtime     losartan (COZAAR) 100 MG tablet  Self Yes No   Sig: Losartan Potassium 100 MG Oral Tablet take 1 tablet by mouth once daily  Quantity: 90;  Refills: 3    Madison PATRICIA MD;  Ryan Rocha 16-Oct-2012 Active   rOPINIRole (REQUIP) 0 25 mg tablet  Self No No   Sig: TAKE ONE TABLET BY MOUTH AT BEDTIME    sotalol (BETAPACE) 80 mg tablet  Self No No   Sig: TAKE 1 TABLET BY MOUTH  TWICE A DAY   traMADol (ULTRAM) 50 mg tablet  Self No No   Sig: TAKE 1 TABLET BY MOUTH TWO  TIMES DAILY AS NEEDED   zolpidem (AMBIEN) 10 mg tablet  Self No No   Sig: Take 1 tablet (10 mg total) by mouth daily at bedtime as needed for sleep      Facility-Administered Medications: None       Past Medical History:   Diagnosis Date    Actinic keratosis     last assessed -     Arthritis     Atrial fibrillation with rapid ventricular response (HCC)     last assessed - [id number]    Basal cell carcinoma     Benign colon polyp     last assessed - [id number]    Effusion of knee joint right     Resolved - [id number]    Esophageal reflux     Fibromyalgia     last assessed - [id number]    Fibromyalgia, primary     Hypertension     Palpitations     last assessed - [id number]    Peroneal tendonitis, right     last assessed - [id number]    Rectal hemorrhage     last assessed - [id number]    Trochanteric bursitis, unspecified hip     last assessed - [id number]       Past Surgical History:   Procedure Laterality Date    CATARACT EXTRACTION      COLONOSCOPY  2018    EYE SURGERY      HYSTERECTOMY      JOINT REPLACEMENT      RECTAL POLYPECTOMY      REPLACEMENT TOTAL KNEE Left     last assessed - [id number]    TONSILLECTOMY      TOTAL ABDOMINAL HYSTERECTOMY      TUBAL LIGATION         Family History   Problem Relation Age of Onset    Heart disease Mother     Diabetes Mother     Heart disease Father     Coronary artery disease Father     Stroke Father         cerebrovascular accident   [de-identified] Heart attack Father         myocardial infarction   [de-identified] Sudden death Father         scd    Other Family         Back disorder    Coronary artery disease Family     Neuropathy Family     Osteoporosis Family     Anuerysm Neg Hx     Clotting disorder Neg Hx     Arrhythmia Neg Hx     Heart failure Neg Hx      I have reviewed and agree with the history as documented  Social History   Substance Use Topics    Smoking status: Never Smoker    Smokeless tobacco: Never Used    Alcohol use No      Comment: occosional - every 3 months        Review of Systems   Constitutional: Negative for activity change, chills, diaphoresis and fever  HENT: Negative for congestion, sinus pressure and sore throat  Eyes: Negative for pain and visual disturbance  Respiratory: Negative for cough, chest tightness, shortness of breath, wheezing and stridor  Cardiovascular: Positive for palpitations  Negative for chest pain  Gastrointestinal: Negative for abdominal distention, abdominal pain, constipation, diarrhea, nausea and vomiting  Genitourinary: Negative for dysuria and frequency  Musculoskeletal: Negative for neck pain and neck stiffness  Skin: Negative for rash  Neurological: Negative for dizziness, speech difficulty, light-headedness, numbness and headaches  Physical Exam  Physical Exam   Constitutional: She is oriented to person, place, and time  She appears well-developed  No distress  HENT:   Head: Normocephalic and atraumatic  Eyes: Pupils are equal, round, and reactive to light  Neck: Normal range of motion  Neck supple  No tracheal deviation present  Cardiovascular: Normal heart sounds and intact distal pulses  An irregularly irregular rhythm present  Tachycardia present  No murmur heard    Atrial fibrillation, ventricular rate of 100 to 120   Pulmonary/Chest: Effort normal and breath sounds normal  No stridor  No respiratory distress  Abdominal: Soft  She exhibits no distension  There is no tenderness  There is no rebound and no guarding  Musculoskeletal: Normal range of motion  Neurological: She is alert and oriented to person, place, and time  Skin: Skin is warm and dry  She is not diaphoretic  No erythema  No pallor  Psychiatric: She has a normal mood and affect  Vitals reviewed  Vital Signs  ED Triage Vitals   Temperature Pulse Respirations Blood Pressure SpO2   09/15/18 0959 09/15/18 0959 09/15/18 0959 09/15/18 0959 09/15/18 0959   97 9 °F (36 6 °C) 104 20 (!) 185/104 99 %      Temp Source Heart Rate Source Patient Position - Orthostatic VS BP Location FiO2 (%)   09/15/18 0959 09/15/18 0959 09/15/18 0959 09/15/18 0959 --   Oral Monitor Sitting Right arm       Pain Score       09/15/18 1030       No Pain           Vitals:    09/15/18 0959 09/15/18 1030   BP: (!) 185/104 136/74   Pulse: 104 104   Patient Position - Orthostatic VS: Sitting Sitting       Visual Acuity      ED Medications  Medications   sodium chloride 0 9 % bolus 500 mL (500 mL Intravenous New Bag 9/15/18 1039)   aspirin chewable tablet 324 mg (not administered)   metoprolol (LOPRESSOR) injection 5 mg (5 mg Intravenous Given 9/15/18 1040)       Diagnostic Studies  Results Reviewed     Procedure Component Value Units Date/Time    TSH [[id number]]  (Normal) Collected:  09/15/18 1015    Lab Status:  Final result Specimen:  Blood from Arm, Left Updated:  09/15/18 1046     TSH 3RD GENERATON 1 077 uIU/mL     Narrative:         Patients undergoing fluorescein dye angiography may retain small amounts of fluorescein in the body for 48-72 hours post procedure  Samples containing fluorescein can produce falsely depressed TSH values   If the patient had this procedure,a specimen should be resubmitted post fluorescein clearance  The recommended reference ranges for TSH during pregnancy are as follows:  First trimester 0 1 to 2 5 uIU/mL  Second trimester  0 2 to 3 0 uIU/mL  Third trimester 0 3 to 3 0 uIU/m      Basic metabolic panel [[id number]]  (Abnormal) Collected:  09/15/18 1015    Lab Status:  Final result Specimen:  Blood from Arm, Left Updated:  09/15/18 1046     Sodium 139 mmol/L      Potassium 4 1 mmol/L      Chloride 103 mmol/L      CO2 26 mmol/L      ANION GAP 10 mmol/L      BUN 20 mg/dL      Creatinine 0 98 mg/dL      Glucose 141 (H) mg/dL      Calcium 9 5 mg/dL      eGFR 57 ml/min/1 73sq m     Narrative:         National Kidney Disease Education Program recommendations are as follows:  GFR calculation is accurate only with a steady state creatinine  Chronic Kidney disease less than 60 ml/min/1 73 sq  meters  Kidney failure less than 15 ml/min/1 73 sq  meters      B-type natriuretic peptide [[id number]]  (Abnormal) Collected:  09/15/18 1015    Lab Status:  Final result Specimen:  Blood from Arm, Left Updated:  09/15/18 1046     NT-proBNP 2,951 (H) pg/mL     Troponin I [[id number]]  (Normal) Collected:  09/15/18 1015    Lab Status:  Final result Specimen:  Blood from Arm, Left Updated:  09/15/18 1042     Troponin I <0 02 ng/mL     CBC and differential [[id number]] Collected:  09/15/18 1015    Lab Status:  Final result Specimen:  Blood from Arm, Left Updated:  09/15/18 1021     WBC 6 84 Thousand/uL      RBC 4 55 Million/uL      Hemoglobin 14 2 g/dL      Hematocrit 42 1 %      MCV 93 fL      MCH 31 2 pg      MCHC 33 7 g/dL      RDW 12 8 %      MPV 9 9 fL      Platelets 111 Thousands/uL      nRBC 0 /100 WBCs      Neutrophils Relative 59 %      Immat GRANS % 0 %      Lymphocytes Relative 29 %      Monocytes Relative 9 %      Eosinophils Relative 2 %      Basophils Relative 1 %      Neutrophils Absolute 3 98 Thousands/µL      Immature Grans Absolute 0 03 Thousand/uL      Lymphocytes Absolute 2 01 Thousands/µL      Monocytes Absolute 0 62 Thousand/µL      Eosinophils Absolute 0 15 Thousand/µL      Basophils Absolute 0 05 Thousands/µL                  XR chest 2 views    (Results Pending)              Procedures  ECG 12 Lead Documentation  Date/Time: 9/15/2018 10:31 AM  Performed by: Meryl Garrison  Authorized by: Meryl Garrison     ECG reviewed by me, the ED Provider: yes    Patient location:  ED  Interpretation:     Interpretation: abnormal    Rate:     ECG rate:  108    ECG rate assessment: tachycardic    Rhythm:     Rhythm: atrial fibrillation    Ectopy:     Ectopy: none    QRS:     QRS axis:  Normal    QRS intervals:  Normal  Conduction:     Conduction: normal    ST segments:     ST segments:  Non-specific  T waves:     T waves: non-specific and inverted      Inverted:  III, aVF, V3, V4, V5 and V6  Comments:      T-wave inversions in leads 3 and AVF, as well as V3 through V6, new from EKG from 2 years prior           Phone Contacts  ED Phone Contact    ED Course         HEART Risk Score      Most Recent Value   History  1 Filed at: 09/15/2018 1057   ECG  1 Filed at: 09/15/2018 1057   Age  2 Filed at: 09/15/2018 1057   Risk Factors  1 Filed at: 09/15/2018 1057   Troponin  0 Filed at: 09/15/2018 1057   Heart Score Risk Calculator   History  1 Filed at: 09/15/2018 1057   ECG  1 Filed at: 09/15/2018 1057   Age  2 Filed at: 09/15/2018 1057   Risk Factors  1 Filed at: 09/15/2018 1057   Troponin  0 Filed at: 09/15/2018 1057   HEART Score  5 Filed at: 09/15/2018 1057   HEART Score  5 Filed at: 09/15/2018 1057                            MDM  Number of Diagnoses or Management Options  Atrial fibrillation, rapid ([facility] [address] ): new and requires workup  Chest pressure: new and requires workup  Diagnosis management comments:       Initial ED assessment:  53-year-old female atrial fibrillation with accelerated ventricular response of around 110 beats per minute   Also having it is chest pressure    Initial DDx includes but is not limited to:     Atrial fibrillation with RVR, ACS, demand myocardial ischemia    Initial ED plan:     Blood work, IV Lopressor, likely serial troponins        Final ED summary/disposition:   After evaluation and workup in the emergency department, elevated heart score, AFib with accelerated response, admitted to hospital for further workup and evaluation       Amount and/or Complexity of Data Reviewed  Clinical lab tests: ordered and reviewed  Decide to obtain previous medical records or to obtain history from someone other than the patient: yes  Obtain history from someone other than the patient: yes  Review and summarize past medical records: yes  Discuss the patient with other providers: yes  Independent visualization of images, tracings, or specimens: yes      CritCare Time    Disposition  Final diagnoses:   Atrial fibrillation, rapid ([address] )   Chest pressure     Time reflects when diagnosis was documented in both MDM as applicable and the Disposition within this note     Time User Action Codes Description Comment    9/15/2018 10:57 AM Kylee Pointer Add [I48 91] Atrial fibrillation, rapid ([address] )     9/15/2018 10:57 AM Kylee Pointer Add [R07 89] Chest pressure       ED Disposition     ED Disposition Condition Comment    Admit  Case was discussed with Dr Cuba Carmona and the patient's admission status was agreed to be Admission Status: observation status to the service of Dr Cuba Carmona  Follow-up Information    None         Patient's Medications   Discharge Prescriptions    No medications on file     No discharge procedures on file      ED Provider  Electronically Signed by           Morenita Donato DO  09/15/18 [id number]

## 2018-09-15 NOTE — ASSESSMENT & PLAN NOTE
Pt p/w chest discomfort in the context of elevated HR; EKG with TWI in III,aVF, V3-V6  Non exertional   KYLIE: 2  Cont telemetry   Trend trops  EKG with 2nd set and prn  Cardiology consult

## 2018-09-15 NOTE — PLAN OF CARE
CARDIOVASCULAR - ADULT     Maintains optimal cardiac output and hemodynamic stability Progressing     Absence of cardiac dysrhythmias or at baseline rhythm Progressing        DISCHARGE PLANNING     Discharge to home or other facility with appropriate resources Progressing        PAIN - ADULT     Verbalizes/displays adequate comfort level or baseline comfort level Progressing

## 2018-09-15 NOTE — ASSESSMENT & PLAN NOTE
Accelerated HR between 100-120s; symptomatic  Increased palpitations since Thursday  Cont sotalol BID   Cont eliquis   Appreciate cardiology input re: management of increased rates on anti-arrythmic

## 2018-09-15 NOTE — H&P
H&P- Ang Capps 1945, 75 y o  female MRN: [id number]    Unit/Bed#: -01 Encounter: [id number]    Primary Care Provider: Michelle Victoria MD   Date and time admitted to hospital: 9/15/2018  9:57 AM    * Chest pain   Assessment & Plan    Pt p/w chest discomfort in the context of elevated HR; EKG with TWI in III,aVF, V3-V6  Non exertional   KYLIE: 2  Cont telemetry   Trend trops  EKG with 2nd set and prn  Cardiology consult           Atrial fibrillation with rapid ventricular response (HCC)   Assessment & Plan    Accelerated HR between 100-120s; symptomatic  Increased palpitations since Thursday  Cont sotalol BID   Cont eliquis   Appreciate cardiology input re: management of increased rates on anti-arrythmic         Essential hypertension   Assessment & Plan    Elevated initially in the ED, but improved   Resume home antihypertensive meds             VTE Prophylaxis: Apixaban (Eliquis)  / sequential compression device   Code Status: FULL   POLST: POLST form is not discussed and not completed at this time  Anticipated Length of Stay:  Patient will be admitted on an Observation basis with an anticipated length of stay of  < 2 midnights  Justification for Hospital Stay: chest pain in the context of uncontrolled afib     Total Time for Visit, including Counseling / Coordination of Care: 45 minutes  Greater than 50% of this total time spent on direct patient counseling and coordination of care  Chief Complaint:   Chest pain; palpitations     History of Present Illness:    Ang Capps is a 75 y o  female h/o afib on [address] [place] [address], osteoarthritis, htn who presents with chest discomfort  Starting on Thursday pt began to notice increased palpitations  Today she some chest discomfort substernal associated with her palpitations  No alleviating or exacerbating factors  Not associated with exertion  Does feel more SOB with activity when her palpitations are more pronounced  No med noncompliance   Is fairly active at baseline without similar chest discomfort  Last stress test in 2016 without abnormality  Review of Systems:    Review of Systems   Constitutional: Positive for fatigue  Respiratory: Positive for shortness of breath  Cardiovascular: Positive for chest pain and palpitations  Negative for leg swelling  Gastrointestinal: Negative  Musculoskeletal: Negative  All other systems reviewed and are negative  Past Medical and Surgical History:     Past Medical History:   Diagnosis Date    Actinic keratosis     last assessed - [id number]    Arthritis     Atrial fibrillation with rapid ventricular response ([address] )     last assessed - [id number]    Basal cell carcinoma     Benign colon polyp     last assessed - [id number]    Effusion of knee joint right     Resolved - 35Ivs8221    Esophageal reflux     Fibromyalgia     last assessed - 72Bwc3086    Fibromyalgia, primary     Hypertension     Palpitations     last assessed - 49Jqa0969    Peroneal tendonitis, right     last assessed - 59RBY4161    Rectal hemorrhage     last assessed - 58ZOT3951    Trochanteric bursitis, unspecified hip     last assessed - 15JZA9741       Past Surgical History:   Procedure Laterality Date    CATARACT EXTRACTION      COLONOSCOPY  03/2018    EYE SURGERY      HYSTERECTOMY      JOINT REPLACEMENT      RECTAL POLYPECTOMY      REPLACEMENT TOTAL KNEE Left     last assessed - 13Jlj5114    TONSILLECTOMY      TOTAL ABDOMINAL HYSTERECTOMY      TUBAL LIGATION         Meds/Allergies:    Prior to Admission medications    Medication Sig Start Date End Date Taking?  Authorizing Provider   amLODIPine (NORVASC) 2 5 mg tablet Take 1 tablet (2 5 mg total) by mouth daily 5/7/18  Yes Nir Cunningham DO   apixaban (ELIQUIS) 5 mg Take 1 tablet (5 mg total) by mouth 2 (two) times a day 6/30/18  Yes Omi Reyes MD   ascorbic acid (VITAMIN C) 500 mg tablet Take 500 mg by mouth 2 (two) times a day   Yes Historical Provider, MD   B Complex-C-Folic Acid (B COMPLEX + C TR) TBCR Take by mouth daily  Yes Historical Provider, MD   Black Cohosh 20 MG TABS Take by mouth   Yes Historical Provider, MD   Cholecalciferol (CVS VITAMIN D) 2000 UNITS CAPS Take 2,000 Units by mouth daily  Yes Historical Provider, MD   Chromium Picolinate POWD Take by mouth daily  Yes Historical Provider, MD   ezetimibe (ZETIA) 10 mg tablet TAKE 1 TABLET BY MOUTH  DAILY 7/23/18  Yes Zaida Sanchez MD   ferrous gluconate (FERGON) 324 mg tablet Take 324 mg by mouth 2 (two) times a day   Yes Historical Provider, MD   folic acid (FOLVITE) 611 MCG tablet Take 400 mcg by mouth daily   Yes Historical Provider, MD   gabapentin (NEURONTIN) 300 mg capsule 2 capsules at bedtime  7/5/18  Yes JENNY Strong   losartan (COZAAR) 100 MG tablet Losartan Potassium 100 MG Oral Tablet take 1 tablet by mouth once daily  Quantity: 90;  Refills: 3    Anthony PATRICIA MD;  Started 16-Oct-2011 Active 10/16/11  Yes Historical Provider, MD   rOPINIRole (REQUIP) 0 25 mg tablet TAKE ONE TABLET BY MOUTH AT BEDTIME  4/11/18  Yes Zaida Sanchez MD   sotalol (BETAPACE) 80 mg tablet TAKE 1 TABLET BY MOUTH  TWICE A DAY 1/30/18  Yes Kristi Young MD   traMADol (ULTRAM) 50 mg tablet TAKE 1 TABLET BY MOUTH TWO  TIMES DAILY AS NEEDED 7/23/18  Yes Zaida Sanchez MD   zolpidem (AMBIEN) 10 mg tablet Take 1 tablet (10 mg total) by mouth daily at bedtime as needed for sleep 7/25/18  Yes Zaida Sanchez MD   ADVANCED EVENING PRIMROSE OIL PO Take by mouth  9/15/18  Historical Provider, MD     I have reviewed home medications using allscripts  Allergies:    Allergies   Allergen Reactions    Penicillins Other (See Comments)     As a child calcium deposit in the arm     Ace Inhibitors GI Intolerance     Did feel well on it       Social History:     Marital Status: /Civil Union   Occupation:   Patient Pre-hospital Living Situation: with    Patient Pre-hospital Level of Mobility: indpendent  Patient Pre-hospital Diet Restrictions: none  Substance Use History:   History   Alcohol Use No     Comment: occosional - every 3 months     History   Smoking Status    Never Smoker   Smokeless Tobacco    Never Used     History   Drug Use No       Family History:    Family History   Problem Relation Age of Onset    Heart disease Mother     Diabetes Mother     Heart disease Father     Coronary artery disease Father     Stroke Father         cerebrovascular accident    Heart attack Father         myocardial infarction   Chilo Pert Sudden death Father         scd    Other Family         Back disorder    Coronary artery disease Family     Neuropathy Family     Osteoporosis Family     Anuerysm Neg Hx     Clotting disorder Neg Hx     Arrhythmia Neg Hx     Heart failure Neg Hx        Physical Exam:     Vitals:   Blood Pressure: 130/88 (09/15/18 1206)  Pulse: (!) 109 (09/15/18 1206)  Temperature: 98 °F (36 7 °C) (09/15/18 1206)  Temp Source: Oral (09/15/18 1206)  Respirations: 20 (09/15/18 1206)  Height: 5' 3" (160 cm) (09/15/18 1206)  Weight - Scale: 72 8 kg (160 lb 7 9 oz) (09/15/18 1206)  SpO2: 96 % (09/15/18 1206)    Physical Exam   Constitutional: She is oriented to person, place, and time  No distress  HENT:   Head: Normocephalic and atraumatic  Cardiovascular: An irregularly irregular rhythm present  Tachycardia present  Exam reveals no friction rub  No murmur heard  Pulmonary/Chest: Effort normal and breath sounds normal  No respiratory distress  She has no wheezes  Abdominal: Soft  Bowel sounds are normal  She exhibits no distension  There is no tenderness  There is no rebound  Musculoskeletal: She exhibits no edema or tenderness  Neurological: She is alert and oriented to person, place, and time  Skin: Skin is warm and dry  She is not diaphoretic  Psychiatric: She has a normal mood and affect  Her behavior is normal    Nursing note and vitals reviewed        Additional Data: Lab Results: I have personally reviewed pertinent reports  Results from last 7 days  Lab Units 09/15/18  1015   WBC Thousand/uL 6 84   HEMOGLOBIN g/dL 14 2   HEMATOCRIT % 42 1   PLATELETS Thousands/uL 263   NEUTROS PCT % 59   LYMPHS PCT % 29   MONOS PCT % 9   EOS PCT % 2       Results from last 7 days  Lab Units 09/15/18  1015 09/11/18  1011   SODIUM mmol/L 139 138   POTASSIUM mmol/L 4 1 4 5   CHLORIDE mmol/L 103 103   CO2 mmol/L 26 29   BUN mg/dL 20 19   CREATININE mg/dL 0 98 0 86   CALCIUM mg/dL 9 5 9 5   ALK PHOS U/L  --  58   ALT U/L  --  29   AST U/L  --  21       Results from last 7 days  Lab Units 09/11/18  1011   INR  1 15       Imaging: I have personally reviewed pertinent reports  Xr Chest 2 Views    Result Date: 9/15/2018  Narrative: CHEST INDICATION:   Palpitations, occasional chest pressure  COMPARISON:  Two-view chest 9/11/2018 EXAM PERFORMED/VIEWS:  XR CHEST PA & LATERAL  The frontal view was performed utilizing dual energy radiographic technique  FINDINGS: Shallow inspiratory result  Cardiomediastinal silhouette appears unremarkable  No airspace consolidation, pneumothorax, pulmonary edema, or large pleural effusion  Bilateral acromioclavicular degenerative disease     Mild thoracolumbar spondylosis  Impression: No radiographic evidence of acute intrathoracic process or significant interval change  Workstation performed: [id number]     Xr Chest Pa & Lateral    Result Date: 9/12/2018  Narrative: CHEST INDICATION:   M17 11: Unilateral primary osteoarthritis, right knee M25 561: Pain in right knee G89 29: Other chronic pain  Preop  COMPARISON:  February 16, 2015, February 26, 2010 and December 5, 2008 EXAM PERFORMED/VIEWS:  XR CHEST PA & LATERAL Images: 2 FINDINGS: Cardiomediastinal silhouette appears unremarkable  The lungs are clear  No pneumothorax or pleural effusion  Osseous structures appear within normal limits for patient age       Impression: No acute cardiopulmonary disease  Workstation performed: [id number]     Dxa Bone Density Spine Hip And Pelvis    Result Date: 9/14/2018  Narrative: CENTRAL  DXA SCAN CLINICAL HISTORY:   75year old post-menopausal  female  Menopause at age 38  Prior treatment for osteoporosis  On calcium and vitamin D supplementation  TECHNIQUE: Bone densitometry was performed using a Horizon A bone densitometer  Regions of interest appear properly placed  There are no obvious fractures or other confounding variables which could limit the study  COMPARISON:  4/28/2016, prior study was performed at a different site  RESULTS: LUMBAR SPINE:  L1-L4: BMD 0 912 gm/cm2 T-score -1 2 Z-score 1 1 LEFT TOTAL HIP: BMD 0 839 gm/cm2 T-score -0 8 Z-score 0 9 LEFT FEMORAL NECK: BMD 0 684 gm/cm2 T-score -1 5 Z-score 0 5     Impression: 1  Based on the [facility] classification, the T-score of -1 5 in the left femoral neck is consistent with low bone mineral density  2   Since the prior study, there has not been a significant change in bone mineral density of the lumbar spine or the left hip  Please note however that the study was performed at a different site and the prior values may not be entirely comparable  3   Any secondary causes of low bone mineral density should be excluded prior to treatment, if clinically indicated  4   A daily intake of at least 1200 mg calcium and 800 to 1000 IU of Vitamin D, as well as weight bearing and muscle strengthening exercise, fall prevention and avoidance of tobacco and excessive alcohol intake as basic preventive measures are suggested  5   Repeat DXA  in 18 - 24 months, on the same machine, as clinically indicated  The 10 year risk of hip fracture is 2 0%, with the 10 year risk of major osteoporotic fracture being 11%, as calculated by the [facility] fracture risk assessment tool (FRAX)    The current NOF guidelines recommend treating patients with FRAX 10 year risk score of >3% for hip fracture and >20% for major osteoporotic fracture  WHO CLASSIFICATION: Normal (a T-score of -1 0 or higher) Low bone mineral density (a T-score of less than -1 0 but higher than -2 5) Osteoporosis (a T-score of -2 5 or less) Severe osteoporosis (a T-score of -2 5 or less with a fragility fracture)   Workstation performed: [id number]     Mammo Screening Bilateral W Cad    Result Date: 9/14/2018  Narrative: Patient History: Patient is postmenopausal  Family history of breast cancer at age 72 in maternal aunt  Took estrogen for 2 years  Patient has never smoked  Patient's BMI is 28 2  Reason for exam: screening, asymptomatic  Mammo Screening Bilateral W CAD: September 14, 2018 - Check In #: [de-identified] Bilateral MLO and CC view(s) were taken  Technologist: RT JUAN CARLOS Mendoza Prior study comparison: May 1, 2017, mammo screening bilateral W CAD performed at [address] [facility]  April 8, 2016, mammo screening bilateral W CAD, performed at [address] [facility]  February 11, 2015, bilateral [facility] mammo w/CAD performed at [address] [facility]  January 2, 2014, digital bilateral screening mammogram, performed at [address] [facility]  [address]  December 13, 2012, digital bilateral screening mammogram, performed at [address] [facility]  [address]  November 16, 2011, digital bilateral screening mammogram, performed at [facility]  November 10, 2010, digital bilateral screening mammogram, performed at [address] [facility]  There are scattered fibroglandular densities  No dominant soft tissue mass, architectural distortion or suspicious calcifications are noted in either breast   The skin and nipple contours are within normal limits  IMPRESSION:  No mammographic evidence of malignancy  No significant changes when compared with prior studies  ACR BI-RADS® Assessments: BiRad:1 - Negative Recommendation: Routine screening mammogram of both breasts in 1 year   Analyzed by CAD The patient is scheduled in a reminder system for screening mammography  8-10% of cancers will be missed on mammography  Management of a palpable abnormality must be based on clinical grounds  Patients will be notified of their results via letter from our facility  Accredited by [employer] of [employer]iology and FDA  Transcription Location: [address] [place] [address]: [id number] Risk Value(s): Tyrer-Cuzick 10 Year: 3 300%, Tyrer-Cuzick Lifetime: 3 700%, Myriad Table: 1 5%, JUDSON 5 Year: 2 0%, NCI Lifetime: 4 5%      EKG, Pathology, and Other Studies Reviewed on Admission:   · EKG: afib 108 with TWI in III, aVF, V3-V6    Allscripts / Epic Records Reviewed: Yes     ** Please Note: This note has been constructed using a voice recognition system   **

## 2018-09-16 ENCOUNTER — APPOINTMENT (INPATIENT)
Dept: NON INVASIVE DIAGNOSTICS | Facility: HOSPITAL | Age: 73
DRG: 310 | End: 2018-09-16
Payer: MEDICARE

## 2018-09-16 LAB
ANION GAP SERPL CALCULATED.3IONS-SCNC: 10 MMOL/L (ref 4–13)
ATRIAL RATE: 131 BPM
ATRIAL RATE: 131 BPM
ATRIAL RATE: 250 BPM
BASOPHILS # BLD AUTO: 0.04 THOUSANDS/ΜL (ref 0–0.1)
BASOPHILS NFR BLD AUTO: 1 % (ref 0–1)
BUN SERPL-MCNC: 18 MG/DL (ref 5–25)
CALCIUM SERPL-MCNC: 9.6 MG/DL (ref 8.3–10.1)
CHLORIDE SERPL-SCNC: 105 MMOL/L (ref 100–108)
CHOLEST SERPL-MCNC: 202 MG/DL (ref 50–200)
CO2 SERPL-SCNC: 27 MMOL/L (ref 21–32)
CREAT SERPL-MCNC: 0.93 MG/DL (ref 0.6–1.3)
EOSINOPHIL # BLD AUTO: 0.2 THOUSAND/ΜL (ref 0–0.61)
EOSINOPHIL NFR BLD AUTO: 3 % (ref 0–6)
ERYTHROCYTE [DISTWIDTH] IN BLOOD BY AUTOMATED COUNT: 13 % (ref 11.6–15.1)
GFR SERPL CREATININE-BSD FRML MDRD: 61 ML/MIN/1.73SQ M
GLUCOSE P FAST SERPL-MCNC: 124 MG/DL (ref 65–99)
GLUCOSE SERPL-MCNC: 124 MG/DL (ref 65–140)
HCT VFR BLD AUTO: 43.5 % (ref 34.8–46.1)
HDLC SERPL-MCNC: 59 MG/DL (ref 40–60)
HGB BLD-MCNC: 14.3 G/DL (ref 11.5–15.4)
IMM GRANULOCYTES # BLD AUTO: 0.01 THOUSAND/UL (ref 0–0.2)
IMM GRANULOCYTES NFR BLD AUTO: 0 % (ref 0–2)
LDLC SERPL CALC-MCNC: 121 MG/DL (ref 0–100)
LYMPHOCYTES # BLD AUTO: 3.02 THOUSANDS/ΜL (ref 0.6–4.47)
LYMPHOCYTES NFR BLD AUTO: 44 % (ref 14–44)
MAGNESIUM SERPL-MCNC: 2 MG/DL (ref 1.6–2.6)
MCH RBC QN AUTO: 30.8 PG (ref 26.8–34.3)
MCHC RBC AUTO-ENTMCNC: 32.9 G/DL (ref 31.4–37.4)
MCV RBC AUTO: 94 FL (ref 82–98)
MONOCYTES # BLD AUTO: 0.64 THOUSAND/ΜL (ref 0.17–1.22)
MONOCYTES NFR BLD AUTO: 9 % (ref 4–12)
NEUTROPHILS # BLD AUTO: 2.96 THOUSANDS/ΜL (ref 1.85–7.62)
NEUTS SEG NFR BLD AUTO: 43 % (ref 43–75)
NONHDLC SERPL-MCNC: 143 MG/DL
NRBC BLD AUTO-RTO: 0 /100 WBCS
PLATELET # BLD AUTO: 275 THOUSANDS/UL (ref 149–390)
PMV BLD AUTO: 10.3 FL (ref 8.9–12.7)
POTASSIUM SERPL-SCNC: 4 MMOL/L (ref 3.5–5.3)
QRS AXIS: 28 DEGREES
QRS AXIS: 28 DEGREES
QRS AXIS: 36 DEGREES
QRSD INTERVAL: 86 MS
QRSD INTERVAL: 90 MS
QRSD INTERVAL: 90 MS
QT INTERVAL: 356 MS
QT INTERVAL: 376 MS
QT INTERVAL: 376 MS
QTC INTERVAL: 445 MS
QTC INTERVAL: 485 MS
QTC INTERVAL: 485 MS
RBC # BLD AUTO: 4.65 MILLION/UL (ref 3.81–5.12)
SODIUM SERPL-SCNC: 142 MMOL/L (ref 136–145)
T WAVE AXIS: -38 DEGREES
T WAVE AXIS: -39 DEGREES
T WAVE AXIS: -39 DEGREES
TRIGL SERPL-MCNC: 109 MG/DL
TSH SERPL DL<=0.05 MIU/L-ACNC: 1.76 UIU/ML (ref 0.36–3.74)
VENTRICULAR RATE: 100 BPM
VENTRICULAR RATE: 100 BPM
VENTRICULAR RATE: 94 BPM
WBC # BLD AUTO: 6.87 THOUSAND/UL (ref 4.31–10.16)

## 2018-09-16 PROCEDURE — 85025 COMPLETE CBC W/AUTO DIFF WBC: CPT | Performed by: HOSPITALIST

## 2018-09-16 PROCEDURE — 99232 SBSQ HOSP IP/OBS MODERATE 35: CPT | Performed by: PHYSICIAN ASSISTANT

## 2018-09-16 PROCEDURE — 80048 BASIC METABOLIC PNL TOTAL CA: CPT | Performed by: HOSPITALIST

## 2018-09-16 PROCEDURE — 83735 ASSAY OF MAGNESIUM: CPT | Performed by: HOSPITALIST

## 2018-09-16 PROCEDURE — 99222 1ST HOSP IP/OBS MODERATE 55: CPT | Performed by: INTERNAL MEDICINE

## 2018-09-16 PROCEDURE — 80061 LIPID PANEL: CPT | Performed by: HOSPITALIST

## 2018-09-16 PROCEDURE — 93010 ELECTROCARDIOGRAM REPORT: CPT | Performed by: INTERNAL MEDICINE

## 2018-09-16 PROCEDURE — 84443 ASSAY THYROID STIM HORMONE: CPT | Performed by: HOSPITALIST

## 2018-09-16 PROCEDURE — 93005 ELECTROCARDIOGRAM TRACING: CPT

## 2018-09-16 PROCEDURE — 93306 TTE W/DOPPLER COMPLETE: CPT

## 2018-09-16 RX ORDER — SOTALOL HYDROCHLORIDE 80 MG/1
120 TABLET ORAL EVERY 12 HOURS SCHEDULED
Status: DISCONTINUED | OUTPATIENT
Start: 2018-09-16 | End: 2018-09-17 | Stop reason: HOSPADM

## 2018-09-16 RX ADMIN — AMLODIPINE BESYLATE 2.5 MG: 2.5 TABLET ORAL at 08:11

## 2018-09-16 RX ADMIN — OXYCODONE HYDROCHLORIDE AND ACETAMINOPHEN 500 MG: 500 TABLET ORAL at 08:12

## 2018-09-16 RX ADMIN — SOTALOL HYDROCHLORIDE 120 MG: 80 TABLET ORAL at 21:09

## 2018-09-16 RX ADMIN — ZOLPIDEM TARTRATE 10 MG: 5 TABLET ORAL at 21:09

## 2018-09-16 RX ADMIN — DILTIAZEM HYDROCHLORIDE 10 MG/HR: 5 INJECTION INTRAVENOUS at 08:34

## 2018-09-16 RX ADMIN — ROPINIROLE 0.25 MG: 0.25 TABLET, FILM COATED ORAL at 21:09

## 2018-09-16 RX ADMIN — FERROUS GLUCONATE TAB 324 MG (37.5 MG ELEMENTAL IRON) 324 MG: 324 (37.5 FE) TAB at 08:12

## 2018-09-16 RX ADMIN — APIXABAN 5 MG: 5 TABLET, FILM COATED ORAL at 08:12

## 2018-09-16 RX ADMIN — FERROUS GLUCONATE TAB 324 MG (37.5 MG ELEMENTAL IRON) 324 MG: 324 (37.5 FE) TAB at 17:15

## 2018-09-16 RX ADMIN — OXYCODONE HYDROCHLORIDE AND ACETAMINOPHEN 500 MG: 500 TABLET ORAL at 17:15

## 2018-09-16 RX ADMIN — SOTALOL HYDROCHLORIDE 120 MG: 80 TABLET ORAL at 08:12

## 2018-09-16 RX ADMIN — APIXABAN 5 MG: 5 TABLET, FILM COATED ORAL at 17:15

## 2018-09-16 RX ADMIN — LOSARTAN POTASSIUM 100 MG: 50 TABLET ORAL at 08:11

## 2018-09-16 RX ADMIN — EZETIMIBE 10 MG: 10 TABLET ORAL at 08:11

## 2018-09-16 RX ADMIN — GABAPENTIN 600 MG: 300 CAPSULE ORAL at 21:09

## 2018-09-16 RX ADMIN — Medication 400 MCG: at 08:12

## 2018-09-16 NOTE — ASSESSMENT & PLAN NOTE
· Patient was noted to present with AFib with RVR  · Take sotalol 80 mg p o  b i d  at home  Was seen by Cardiology on the morning of 9/16/2018, sotalol was increased to 120 mg p o  b i d , echocardiogram was ordered, Cardizem drip was started, patient was then noted to convert to normal sinus rhythm after a 6 second pause  Cardizem was discontinued  · Repeat EKG post cardioversion and higher sotalol doses noted to show a QTC of 472 post cardioversion and of 489 at 1403  · Repeat QTC 2 hrs post higher dose of sotalol tonight and tomorrow AM  If QTC not prolonged, would discharge with sotalol at higher dose  · Outpatient follow-up with Cardiology  · Patient has been on anticoagulation without interruption

## 2018-09-16 NOTE — PHYSICIAN ADVISOR
Current patient class: Inpatient  The patient is currently on Hospital Day: 2 at [address] [facility]      The patient was admitted to the hospital at [place] [address] on 9/16/18 for the following diagnosis:  Chest discomfort [R07 89]  Chest pressure [R07 89]  Atrial fibrillation, rapid ([address] ) [I48 91]       There is documentation in the medical record of an expected length of stay of at least 2 midnights  The patient is therefore expected to satisfy the 2 midnight benchmark and given the 2 midnight presumption is appropriate for INPATIENT ADMISSION  Given this expectation of a satisfying stay, CMS instructs us that the patient is most often appropriate for inpatient admission under part A provided medical necessity is documented in the chart  After review of the relevant documentation, labs, vital signs and test results, the patient is appropriate for INPATIENT ADMISSION  Admission to the hospital as an inpatient is a complex decision making process which requires the practitioner to consider the patients presenting complaint, history and physical examination and all relevant testing  With this in mind, in this case, the patient was deemed appropriate for INPATIENT ADMISSION  After review of the documentation and testing available at the time of the admission I concur with this clinical determination of medical necessity  Rationale is as follows: The patient is a 75 yrs old Female who presented to the ED at 9/15/2018  9:57 AM with a chief complaint of Rapid Heart Rate (States she feels like her heart is racing  Has a previous history of A fib w maintence medication  Just saw cardiology for routine visit last week and he said all was "fine " Rapid heart beat started on thrusday  )     Given the need for further hospitalization, and along with the documentation of medical necessity present in the chart, the patient is appropriate for inpatient admission    The patient is expected to satisfy the 2 midnight benchmark, and will require further acute medical care  The patient does have comorbid conditions which increases the risk for significant adverse outcome  Given this the patient is appropriate for inpatient admission        The patients vitals on arrival were ED Triage Vitals   Temperature Pulse Respirations Blood Pressure SpO2   09/15/18 0959 09/15/18 0959 09/15/18 0959 09/15/18 0959 09/15/18 0959   97 9 °F (36 6 °C) 104 20 (!) 185/104 99 %      Temp Source Heart Rate Source Patient Position - Orthostatic VS BP Location FiO2 (%)   09/15/18 0959 09/15/18 0959 09/15/18 0959 09/15/18 0959 --   Oral Monitor Sitting Right arm       Pain Score       09/15/18 1030       No Pain           Past Medical History:   Diagnosis Date    Actinic keratosis     last assessed - [id number]    Arthritis     Atrial fibrillation with rapid ventricular response (HCC)     last assessed - 39Fde9258    Basal cell carcinoma     Benign colon polyp     last assessed - 22Gnk3074    Effusion of knee joint right     Resolved - 85Yqn3941    Esophageal reflux     Fibromyalgia     last assessed - 71Zby0256    Fibromyalgia, primary     Hypertension     Palpitations     last assessed - 18Skt9763    Peroneal tendonitis, right     last assessed - 83Xkg6131    Rectal hemorrhage     last assessed - 90NTR3909    Trochanteric bursitis, unspecified hip     last assessed - 27YBO3244     Past Surgical History:   Procedure Laterality Date    CATARACT EXTRACTION      COLONOSCOPY  03/2018    EYE SURGERY      HYSTERECTOMY      JOINT REPLACEMENT      RECTAL POLYPECTOMY      REPLACEMENT TOTAL KNEE Left     last assessed - 52Bkd2344    TONSILLECTOMY      TOTAL ABDOMINAL HYSTERECTOMY      TUBAL LIGATION             Consults have been placed to:   IP CONSULT TO CARDIOLOGY    Vitals:    09/16/18 0930 09/16/18 0941 09/16/18 1000 09/16/18 1511   BP: 135/85 132/80 142/79 158/70   BP Location: Right arm  Right arm Right arm   Pulse:   62 64 Resp:   16 16   Temp:   98 2 °F (36 8 °C) 98 7 °F (37 1 °C)   TempSrc:   Oral Oral   SpO2:   97% 98%   Weight:       Height:           Most recent labs:    Recent Labs      09/15/18   1556  09/16/18   0501   WBC   --   6 87   HGB   --   14 3   HCT   --   43 5   PLT   --   275   K   --   4 0   NA   --   142   CALCIUM   --   9 6   BUN   --   18   CREATININE   --   0 93   TROPONINI  <0 02   --        Scheduled Meds:  Current Facility-Administered Medications:  amLODIPine 2 5 mg Oral Daily Almon Showers, MD   apixaban 5 mg Oral BID Almon Showers, MD   ascorbic acid 500 mg Oral BID Almon ShowMD dilia   ezetimibe 10 mg Oral Daily Almon Showers, MD   ferrous gluconate 324 mg Oral BID Almon Showers, MD   folic acid 439 mcg Oral Daily Almon Showers, MD   gabapentin 600 mg Oral HS Almon ShowMD dilia   losartan 100 mg Oral Daily Neelon ShowMD dilia   rOPINIRole 0 25 mg Oral HS Almon ShowMD dilia   sotalol 120 mg Oral Q12H [address] Nir Whiteside DO   traMADol 50 mg Oral Q6H PRN Shirin Rhodes MD   zolpidem 10 mg Oral HS PRN Shirin Rhodes MD     Continuous Infusions:   PRN Meds: traMADol    zolpidem    Surgical procedures (if appropriate):

## 2018-09-16 NOTE — PROGRESS NOTES
cardizem gtt started at 10mg/hr per order at 8:34 am  At 9:55am, gttdecreased from 10mg/hr to 7 5 mg/hr for HR 80s-100s  At 10:00am pt had 6 second pause  cardizem gtt stopped  Appears to have converted on telemetry  ECG done  Vital signs WNL   Pt complained of "funny feeling" across her forehead as this event occurred on telemetry    Discussed with [place] and cardiology

## 2018-09-16 NOTE — PROGRESS NOTES
[facility]  Progress Note - Janeen Bridges 1945, 75 y o  female MRN: [id number]  Unit/Bed#: -Janki Encounter: [id number]  Primary Care Provider: Aida Thomas MD   Date and time admitted to hospital: 9/15/2018  9:57 AM    * Atrial fibrillation with rapid ventricular response ([address] )   Assessment & Plan    · Patient was noted to present with AFib with RVR  · Take sotalol 80 mg p o  b i d  at home  Was seen by Cardiology on the morning of 9/16/2018, sotalol was increased to 120 mg p o  b i d , echocardiogram was ordered, Cardizem drip was started, patient was then noted to convert to normal sinus rhythm after a 6 second pause  Cardizem was discontinued  · Repeat EKG post cardioversion and higher sotalol doses noted to show a QTC of 472 post cardioversion and of 489 at 1403  · Repeat QTC 2 hrs post higher dose of sotalol tonight and tomorrow AM  If QTC not prolonged, would discharge with sotalol at higher dose  · Outpatient follow-up with Cardiology  · Patient has been on anticoagulation without interruption  Chest pain   Assessment & Plan    · Felt to be related to AFib with RVR  · Troponins negative  · Cardiology following  Essential hypertension   Assessment & Plan    · Continue Norvasc, losartan        Dyslipidemia   Assessment & Plan    · zetia          VTE Pharmacologic Prophylaxis:   Pharmacologic: Apixaban (Eliquis)  Mechanical VTE Prophylaxis in Place: Yes    Patient Centered Rounds: I have performed bedside rounds with nursing staff today  Sujata Flanagan    Discussions with Specialists or Other Care Team Provider:  Cardiology    Education and Discussions with Family / Patient: patient,     Time Spent for Care: 30 minutes  More than 50% of total time spent on counseling and coordination of care as described above      Current Length of Stay: 0 day(s)    Current Patient Status: Inpatient   Certification Statement: The patient, admitted on an observation basis, will now require > 2 midnight hospital stay due to AFib with RVR status post increased total doses and Cardizem drip this morning there for the patient this morning was changed from observation to inpatient status  Anticipate discharge tomorrow maintained in normal sinus rhythm and QTC monitored  Discharge Plan:  Likely tomorrow morning    Code Status: Level 1 - Full Code      Subjective:   Patient felt a funny feeling in her head when she was noted to have her conversion pause  She has no chest pain or shortness of breath  Objective:     Vitals:   Temp (24hrs), Av 2 °F (36 8 °C), Min:97 8 °F (36 6 °C), Max:98 7 °F (37 1 °C)    HR:  [] 64  Resp:  [16-20] 16  BP: (132-158)/(58-85) 158/70  SpO2:  [97 %-98 %] 98 %  Body mass index is 28 43 kg/m²  Input and Output Summary (last 24 hours): Intake/Output Summary (Last 24 hours) at 18 1636  Last data filed at 18 0955   Gross per 24 hour   Intake            493 5 ml   Output                0 ml   Net            493 5 ml       Physical Exam:     Physical Exam   Constitutional: No distress  HENT:   Head: Normocephalic and atraumatic  Eyes: Conjunctivae are normal    Cardiovascular: Normal rate, regular rhythm, normal heart sounds and intact distal pulses  No murmur heard  Pulmonary/Chest: Effort normal and breath sounds normal  No accessory muscle usage  No respiratory distress  She has no wheezes  She has no rales  Abdominal: Soft  Bowel sounds are normal  She exhibits no distension  There is no tenderness  There is no rigidity, no rebound and no guarding  Musculoskeletal: She exhibits no edema  Neurological: She is alert  Skin: Skin is warm and dry  No rash noted  She is not diaphoretic  No erythema  Psychiatric: She has a normal mood and affect  Her behavior is normal    Nursing note and vitals reviewed      Additional Data:     Labs:      Results from last 7 days  Lab Units 18  0501   WBC Thousand/uL 6 87 HEMOGLOBIN g/dL 14 3   HEMATOCRIT % 43 5   PLATELETS Thousands/uL 275   NEUTROS PCT % 43   LYMPHS PCT % 44   MONOS PCT % 9   EOS PCT % 3       Results from last 7 days  Lab Units 09/16/18  0501  09/11/18  1011   SODIUM mmol/L 142  < > 138   POTASSIUM mmol/L 4 0  < > 4 5   CHLORIDE mmol/L 105  < > 103   CO2 mmol/L 27  < > 29   BUN mg/dL 18  < > 19   CREATININE mg/dL 0 93  < > 0 86   CALCIUM mg/dL 9 6  < > 9 5   ALK PHOS U/L  --   --  58   ALT U/L  --   --  29   AST U/L  --   --  21   < > = values in this interval not displayed  Results from last 7 days  Lab Units 09/11/18  1011   INR  1 15           Results from last 7 days  Lab Units 09/11/18  1011   HEMOGLOBIN A1C % 6 5*         * I Have Reviewed All Lab Data Listed Above  * Additional Pertinent Lab Tests Reviewed: All Labs Within Last 24 Hours Reviewed    Imaging:    Imaging Reports Reviewed Today Include: CXR  Imaging Personally Reviewed by Myself Includes:  none    Recent Cultures (last 7 days):           Last 24 Hours Medication List:     Current Facility-Administered Medications:  amLODIPine 2 5 mg Oral Daily Tonny Doe MD   apixaban 5 mg Oral BID Tonny Doe MD   ascorbic acid 500 mg Oral BID Tonny Doe MD   ezetimibe 10 mg Oral Daily Tonny Doe MD   ferrous gluconate 324 mg Oral BID Tonny Doe MD   folic acid 937 mcg Oral Daily Tonny Doe MD   gabapentin 600 mg Oral HS Tonny Doe MD   losartan 100 mg Oral Daily Tonny Doe MD   rOPINIRole 0 25 mg Oral HS Tonny Doe MD   sotalol 120 mg Oral Q12H [facility] Nir Whiteside DO   traMADol 50 mg Oral Q6H PRN Tonny Doe MD   zolpidem 10 mg Oral HS PRN Tonny Doe MD        Today, Patient Was Seen By: Emerita Trinh PA-C    ** Please Note: Dictation voice to text software may have been used in the creation of this document   **

## 2018-09-16 NOTE — DISCHARGE SUMMARY
[address] [facility]  [facility] 1945[facility] 75 [facility]  [facility] MRN: [id number]  Unit/Bed#: -01 Encounter: [id number]  Primary Care Provider: Serge Peters MD   Date and time admitted to hospital: 9/15/2018  9:57 AM    * Atrial fibrillation with rapid ventricular response ([address] )   Assessment & Plan    · Patient was noted to present with AFib with RVR  · Take sotalol 80 mg p o  b i d  at home  Was seen by Cardiology on the morning of 9/16/2018, sotalol was increased to 120 mg p o  b i d , echocardiogram was ordered, Cardizem drip was started, patient was then noted to convert to normal sinus rhythm after a 6 second pause  Cardizem was discontinued  · Repeat EKG after 2 doses higher dose sotalol shows  ms  Reviewed personally and shown to Dr Katie Madrigal of cardiology, ana for dc on increased dose of sotalol   · Outpatient follow-up with Cardiology, Dr Paddy Gong  · Patient has been on anticoagulation without interruption  Chest pain   Assessment & Plan    · Felt to be related to AFib with RVR  · Troponins negative  Resolved   · Cardiology following            Dyslipidemia   Assessment & Plan    · Continue zetia         Essential hypertension   Assessment & Plan    · Continue Norvasc, losartan  · Pressures acceptable           Discharging Physician / Practitioner: Kareem Good PA-C  PCP: Serge Peters MD  Admission Date:   Admission Orders     Ordered        09/16/18 0831  Inpatient Admission  Once         09/15/18 1102  Place in Observation (expected length of stay for this patient is less than two midnights)  Once             Discharge Date: 09/17/18    Resolved Problems  Date Reviewed: 9/17/2018    None          Consultations During Hospital Stay:  · Cardiology Dr Jason Velez    Procedures Performed:     · Telemetry- conversion pause of 6 seconds  · CXR: No radiographic evidence of acute intrathoracic process or significant interval change    · ECHO: EF 60%, G2DD, mildly dilated LA, other mild valvular abnormalities     Significant Findings / Test Results:     · AFib with RVR status post conversion with conversion pause  · Chest pain, felt to be related to AFib with RVR    Incidental Findings:   · None    Test Results Pending at Discharge (will require follow up): · None     Outpatient Tests Requested:  · Follow-up with Cardiology as an outpatient    Complications:  None    Reason for Admission:  AFib with RVR, chest pain    Hospital Course:     Yulisa Schwab is a 75 y o  female patient who originally presented to the hospital on 9/15/2018 due to chest pain and atrial fibrillation with rapid ventricular response  The patient was noted to have past medical history of AFib, maintained on Eliquis and sotalol  She follows with Dr Carmencita Boas  She also has past medical history of hypertension, hyperlipidemia, chronic knee pain, osteoarthritis and is to undergo right knee replacement shortly  She has a history of left knee replacement  The patient was noted to be seen on 9/10/2018 and it was felt that the patient was doing well on sotalol  She got occasional palpitations however these will mild short lived  She was felt to be moderate risk for her knee procedure but no further cardiac testing was recommended  She reported the beginning on Thursday she began to have increasing palpitations  She had some chest discomfort the day of admission with palpitations with no alleviating or aggravating factors  She was noted to have no exertional component  She does feel more short of breath when her palpitations were increased  She is compliant with her medications and has not missed any sotalol or Eliquis  She was noted to be subsequently admitted, seen in consultation by Cardiology secondary to AFib with RVR  She was started on a Cardizem infusion and her sotalol dose was increased from 80 mg b i d  to 120 mg b i d  Tessa Ayoub   2 hours after increased dose of sotalol and Cardizem infusion, the patient was noted to have conversion pause of 6 seconds and converted to normal sinus rhythm  She was noted to feel dizzy and have a funny feeling during this  She was then noted to be maintained in normal sinus rhythm  Her QTC an EKG post conversion were noted to show a QTC of 489  She was noted to have echocardiogram which showed mildly dilated LA, EF 60%, G2DD  Her EKG this AM showed QTC of 500 ms  Cardiology was okay with her dc on increased dose of sotalol 120 mg BID  She will need to f/u with her primary cardiologist Dr Reinier Ladd  Please see above list of diagnoses and related plan for additional information  Condition at Discharge: stable     Discharge Day Visit / Exam:     Subjective:  Feeling well  No nausea or vomiting  No chest pain or shortness of breath  Vitals: Blood Pressure: 131/60 (09/17/18 0816)  Pulse: 63 (09/17/18 0816)  Temperature: 98 2 °F (36 8 °C) (09/17/18 0816)  Temp Source: Oral (09/17/18 0816)  Respirations: 16 (09/17/18 0816)  Height: 5' 3" (160 cm) (09/15/18 1206)  Weight - Scale: 72 8 kg (160 lb 7 9 oz) (09/15/18 1206)  SpO2: 95 % (09/17/18 0816)  Exam:   Physical Exam   Constitutional: She is oriented to person, place, and time  No distress  HENT:   Head: Normocephalic and atraumatic  Eyes: Conjunctivae are normal    Cardiovascular: Normal rate, regular rhythm, normal heart sounds and intact distal pulses  No murmur heard  Pulmonary/Chest: Effort normal and breath sounds normal  No accessory muscle usage  No respiratory distress  She has no wheezes  She has no rales  Abdominal: Soft  Bowel sounds are normal  She exhibits no distension  There is no tenderness  There is no rigidity, no rebound and no guarding  Musculoskeletal: She exhibits no edema  Neurological: She is alert and oriented to person, place, and time  Skin: Skin is warm and dry  No rash noted  She is not diaphoretic  No erythema  Psychiatric: She has a normal mood and affect   Her behavior is normal    Nursing note and vitals reviewed  Discussion with Family:  Patient    Discharge instructions/Information to patient and family:   See after visit summary for information provided to patient and family  Provisions for Follow-Up Care:  See after visit summary for information related to follow-up care and any pertinent home health orders  Disposition:     Home    For Discharges to [facility] SNF:   · Not Applicable to this Patient - Not Applicable to this Patient    Planned Readmission: no     Discharge Statement:  I spent 35 minutes discharging the patient  This time was spent on the day of discharge  I had direct contact with the patient on the day of discharge  Greater than 50% of the total time was spent examining patient, answering all patient questions, arranging and discussing plan of care with patient as well as directly providing post-discharge instructions  Additional time then spent on discharge activities  Discharge Medications:  See after visit summary for reconciled discharge medications provided to patient and family        ** Please Note: This note has been constructed using a voice recognition system **

## 2018-09-16 NOTE — PROGRESS NOTES
D/w RN, pt with conversion into NSR, will obtain post cv ECG, continue sotalol 120mg bid, she can be discharged later today if maintains NSR  Will arrange EP follow up and ECG in office this week

## 2018-09-16 NOTE — CONSULTS
Consultation - Cardiology   Vipul Diez 75 y o  female MRN: [id number]  Unit/Bed#: -01 Encounter: [id number]      Assessment:   1  Atriallation rapid ventricular rate, on sotalol 80 mg b i d  at home, history normal left ventricular systolic function, nuclear stress test in 2016 no ischemia    2  Essential hypertension    3  Recently evaluated pre knee arthroplasty, elective knee replacement, Dr Aiden Hannon    4  elev BNP, no clinical CHF, secondary afib/rvr    Plan: Will increase sotalol to 120 mg b i d , rates continue to be fast and will start IV Cardizem as well to maintain rates below 120  If no spontaneous cardioversion with increased sotalol and continue anticoagulation will keep NPO for cardioversion tomorrow  Ultimately, discussed ablation should she be refractory to antiarrhythmic therapy and consideration electrophysiology evaluation for that  In the meantime, will obtain 2D echocardiogram    History of Present Illness   Physician Requesting Consult: Monica Hoffman MD  Reason for Consult / Principal Problem:   Atrial fibrillation  HPI: Vipul Diez is a 75y o  year old female who presents with atrial fibrillation, symptomatic with palpitations and chest tightness in the setting of finding AFib with rapid ventricular rate  Recently evaluated few days ago in our office for paroxysmal atrial fibrillation  She is on sotalol 80 mg b i d  and anticoagulation  Sudden onset yesterday  ECG confirmed atrial fibrillation with rapid ventricular rate and she was admitted and then subsequent to that cardiology consult  She has a history of normal left ventricular systolic function  Nuclear stress testing normal 2016  Patient does feel her atrial fibrillation the form of palpitations and an odd sensation in her chest   It is worse when she exerts herself         Review of Systems:  Review of Systems    14 systems reviewed and negative with the exception of the above and the following      Current Facility-Administered Medications:  amLODIPine 2 5 mg Oral Daily Milind Childs MD   apixaban 5 mg Oral BID Milind Childs MD   ascorbic acid 500 mg Oral BID Milind Childs MD   diltiazem 1-15 mg/hr Intravenous Titrated Chandra Villar, DO   ezetimibe 10 mg Oral Daily Milind Childs MD   ferrous gluconate 324 mg Oral BID Milind Childs MD   folic acid 317 mcg Oral Daily Milind Childs MD   gabapentin 600 mg Oral HS Milind Childs MD   losartan 100 mg Oral Daily Milind Childs MD   rOPINIRole 0 25 mg Oral HS Milind Childs MD   sotalol 120 mg Oral Q12H [address] Nir Whiteside, DO   traMADol 50 mg Oral Q6H PRN Milind Childs MD   zolpidem 10 mg Oral HS PRN Milind Childs MD       diltiazem 1-15 mg/hr           Historical Information   Past Medical History:   Diagnosis Date    Actinic keratosis     last assessed - 06Jun2014    Arthritis     Atrial fibrillation with rapid ventricular response (HCC)     last assessed - 26Apr2016    Basal cell carcinoma     Benign colon polyp     last assessed - 27Apr2015    Effusion of knee joint right     Resolved - [id number]    Esophageal reflux     Fibromyalgia     last assessed - [id number]    Fibromyalgia, primary     Hypertension     Palpitations     last assessed - [id number]    Peroneal tendonitis, right     last assessed - [id number]    Rectal hemorrhage     last assessed - [id number]    Trochanteric bursitis, unspecified hip     last assessed - [id number]     Past Surgical History:   Procedure Laterality Date    CATARACT EXTRACTION      COLONOSCOPY  03/2018    EYE SURGERY      HYSTERECTOMY      JOINT REPLACEMENT      RECTAL POLYPECTOMY      REPLACEMENT TOTAL KNEE Left     last assessed - 27Apr2015    TONSILLECTOMY      TOTAL ABDOMINAL HYSTERECTOMY      TUBAL LIGATION       History   Alcohol Use No     Comment: occosional - every 3 months     History   Drug Use No     History   Smoking Status    Never Smoker   Smokeless Tobacco    Never Used     Family History:   Family History   Problem Relation Age of Onset    Heart disease Mother     Diabetes Mother     Heart disease Father     Coronary artery disease Father     Stroke Father         cerebrovascular accident   [employer] Heart attack Father         myocardial infarction   [employer] Sudden death Father         scd    Other Family         Back disorder    Coronary artery disease Family     Neuropathy Family     Osteoporosis Family     Anuerysm Neg Hx     Clotting disorder Neg Hx     Arrhythmia Neg Hx     Heart failure Neg Hx        Social, Family and medication history as listed, reviewed and updated as necessary    Meds/Allergies         Allergies   Allergen Reactions    Penicillins Other (See Comments)     As a child calcium deposit in the arm     Ace Inhibitors GI Intolerance     Did feel well on it       Objective   Vitals: Blood pressure 144/70, pulse 97, temperature 97 8 °F (36 6 °C), temperature source Oral, resp  rate 20, height 5' 3" (1 6 m), weight 72 8 kg (160 lb 7 9 oz), SpO2 97 %  , Body mass index is 28 43 kg/m² , Orthostatic Blood Pressures      Most Recent Value   Blood Pressure  144/70 filed at 09/15/2018 2202   Patient Position - Orthostatic VS  Sitting filed at 09/15/2018 1547          No intake or output data in the 24 hours ending 09/16/18 0737    Invasive Devices     Peripheral Intravenous Line            Peripheral IV 09/15/18 Left Antecubital less than 1 day                    Physical Exam:  Physical Exam    Gen: No acute distress  HEENT: anicteric, mucous membranes moist  Neck: supple, no jugular venous distention, or carotid bruit  Heart: irregular, normal s1 and s2, no murmur/rub or gallop  Lungs :clear to auscultation bilaterally, no rales/rhonchi or wheeze  Abdomen: soft nontender, normoactive bowel sounds, no organomegaly  Ext: warm and perfused, normal femoral pulses, no edema, clubbing  Skin: warm, no rashes  Neuro: AAO x 3, no focal findings  Psychiatric: normal affect  Musculoskeletal: no obvious joint deformities      Lab Results:       Results from last 7 days  Lab Units 09/16/18  0501 09/15/18  1015   SODIUM mmol/L 142 139   POTASSIUM mmol/L 4 0 4 1   CHLORIDE mmol/L 105 103   CO2 mmol/L 27 26   BUN mg/dL 18 20   CREATININE mg/dL 0 93 0 98   CALCIUM mg/dL 9 6 9 5         Results from last 7 days  Lab Units 09/15/18  1556 09/15/18  1235 09/15/18  1015   TROPONIN I ng/mL <0 02 <0 02 <0 02         Results from last 7 days  Lab Units 09/16/18  0501 09/15/18  1015   WBC Thousand/uL 6 87 6 84   HEMOGLOBIN g/dL 14 3 14 2   HEMATOCRIT % 43 5 42 1   PLATELETS Thousands/uL 275 263       Lab Results   Component Value Date    CHOL 205 05/06/2015    CHOL 195 07/10/2014     Lab Results   Component Value Date    HDL 58 04/25/2018    HDL 50 04/21/2016     Lab Results   Component Value Date    LDLCALC 90 04/25/2018    LDLCALC 104 (H) 04/21/2016     Lab Results   Component Value Date    TRIG 76 04/25/2018    TRIG 89 04/21/2016           Lab Results   Component Value Date    ALT 29 09/11/2018    ALT 27 04/25/2018    AST 21 09/11/2018    AST 20 04/25/2018         Results from last 7 days  Lab Units 09/11/18  1011   INR  1 15         Lab Results   Component Value Date    NTBNP 2,951 (H) 09/15/2018         Lab Results   Component Value Date    HGBA1C 6 5 (H) 09/11/2018    HGBA1C 5 9 (H) 04/21/2016       Imaging: I have personally reviewed pertinent films in PACS    Atrial fibrillation rapid ventricular rate on monitor

## 2018-09-16 NOTE — CASE MANAGEMENT
Thank you,  [address]  Utilization Review Department  Phone: [phone]; Fax [phone]  ATTENTION: Please call with any questions or concerns to [phone]  and carefully follow the prompts so that you are directed to the right person  Send all requests for admission clinical reviews, approved or denied determinations and any other requests to fax [phone]  All voicemails are confidential    Initial Clinical Review    Admission: Date/Time/Statement:  OBSERVATION ADMISSION 09/15/18 1102 CONVERTED TO INPATIENT ADMISSION 09/16/18 0831 DUE TO CONTINUING HIGH HEART RATES WITH POTENTIAL CARDIOVERSION    Orders Placed This Encounter   Procedures    Inpatient Admission     Standing Status:   Standing     Number of Occurrences:   1     Order Specific Question:   Admitting Physician     Answer:   Lucinda Walls [[id number]]     Order Specific Question:   Level of Care     Answer:   Med Surg [16]     Order Specific Question:   Estimated length of stay     Answer:   More than 2 Midnights     Order Specific Question:   Certification     Answer:   I certify that inpatient services are medically necessary for this patient for a duration of greater than two midnights  See H&P and MD Progress Notes for additional information about the patient's course of treatment  ED: Date/Time/Mode of Arrival:   ED Arrival Information     Expected Arrival Acuity Means of Arrival Escorted By Service Admission Type    - 9/15/2018 09:47 Urgent Walk-In Family Member Hospitalist Urgent    Arrival Complaint    CHEST DISCOMFORT          Chief Complaint:   Chief Complaint   Patient presents with    Rapid Heart Rate     States she feels like her heart is racing  Has a previous history of A fib w maintence medication  Just saw cardiology for routine visit last week and he said all was "fine " Rapid heart beat started on thrusday  History of Illness: 75 y o  Female presents with chest discomfort   She is compliant with medication regime & just visited her Cardiologist  Began on Thurs with increased palpitations & substernal chest pain  No alleviating or exacerbating factors, states she is more SOB when palpitations are pronounced  ED Vital Signs:   ED Triage Vitals   Temperature Pulse Respirations Blood Pressure SpO2   09/15/18 0959 09/15/18 0959 09/15/18 0959 09/15/18 0959 09/15/18 0959   97 9 °F (36 6 °C) 104 20 (!) 185/104 99 %      Temp Source Heart Rate Source Patient Position - Orthostatic VS BP Location FiO2 (%)   09/15/18 0959 09/15/18 0959 09/15/18 0959 09/15/18 0959 --   Oral Monitor Sitting Right arm       Pain Score       09/15/18 1030       No Pain        Wt Readings from Last 1 Encounters:   09/15/18 72 8 kg (160 lb 7 9 oz)       Vital Signs (abnormal): 09/15  BP: 185/104    Abnormal Labs/Diagnostic Test Results: 09/15: glucose 141, NT-proBNP 2,951protime 14 8, HGBA1c 6 5,   EKG:Atrial fibrillation  T wave abnormality, consider inferior ischemia  Abnormal ECG    09/16: cholesterol 202, ,     ED Treatment:   Medication Administration from 09/15/2018 0947 to 09/15/2018 1206       Date/Time Order Dose Route Action Action by Comments     09/15/2018 1039 sodium chloride 0 9 % bolus 500 mL 500 mL Intravenous [facility], [address] [place] [address]      09/15/2018 1040 metoprolol (LOPRESSOR) injection 5 mg 5 mg Intravenous Given Naga Pizano RN bp 136/74 HR      09/15/2018 1123 aspirin chewable tablet 324 mg 324 mg Oral Given Naga Pizano RN           Past Medical/Surgical History:    Active Ambulatory Problems     Diagnosis Date Noted    Atrial fibrillation with rapid ventricular response ([facility] ) 04/19/2016    Essential hypertension 04/19/2016    Dyslipidemia 04/19/2016    Palpitations 04/19/2016    Sacroiliitis ([facility] [address] ) 04/19/2016    Chronic pain of right knee 03/09/2018    Primary osteoarthritis of right knee 03/09/2018    Left hip pain 03/09/2018    Trochanteric bursitis of left hip 03/09/2018    Thumb pain, left 03/09/2018    Effusion of right knee 05/18/2018     Resolved Ambulatory Problems     Diagnosis Date Noted    No Resolved Ambulatory Problems     Past Medical History:   Diagnosis Date    Actinic keratosis     Arthritis     Atrial fibrillation with rapid ventricular response (HCC)     Basal cell carcinoma     Benign colon polyp     Effusion of knee joint right     Esophageal reflux     Fibromyalgia     Fibromyalgia, primary     Hypertension     Palpitations     Peroneal tendonitis, right     Rectal hemorrhage     Trochanteric bursitis, unspecified hip        Admitting Diagnosis: Chest discomfort [R07 89]  Chest pressure [R07 89]  Atrial fibrillation, rapid ([facility] [address] ) [I48 91]    Age/Sex: 75 y o  female    Assessment/Plan:   Chest pain   Assessment & Plan     Pt p/w chest discomfort in the context of elevated HR; EKG with TWI in III,aVF, V3-V6  Non exertional   KYLIE: 2  Cont telemetry   Trend trops  EKG with 2nd set and prn  Cardiology consult              Atrial fibrillation with rapid ventricular response (HCC)   Assessment & Plan     Accelerated HR between 100-120s; symptomatic  Increased palpitations since Thursday  Cont sotalol BID   Cont eliquis   Appreciate cardiology input re: management of increased rates on anti-arrythmic           Essential hypertension   Assessment & Plan     Elevated initially in the ED, but improved   Resume home antihypertensive meds          Admission Orders:  Scheduled Meds:   Current Facility-Administered Medications:  amLODIPine 2 5 mg Oral Daily   apixaban 5 mg Oral BID   ascorbic acid 500 mg Oral BID   ezetimibe 10 mg Oral Daily   ferrous gluconate 324 mg Oral BID   folic acid 969 mcg Oral Daily   gabapentin 600 mg Oral HS   losartan 100 mg Oral Daily   rOPINIRole 0 25 mg Oral HS   sotalol 120 mg Oral Q12H JOAQUINA   traMADol 50 mg Oral Q6H PRN   zolpidem 10 mg Oral HS PRN     Continuous Infusions:    PRN Meds:   48 hr tele  ECG 12 lead  ECHO  cont ST segment monitoring  Cardiac diet 2 3 gm NA but NPO after midnight 9/17 @0001      09/16/2018 Cardiology Note  :Assessment:   1  Atriallation rapid ventricular rate, on sotalol 80 mg b i d  at home, history normal left ventricular systolic function, nuclear stress test in 2016 no ischemia     2  Essential hypertension     3  Recently evaluated pre knee arthroplasty, elective knee replacement, Dr Tonya Fitzgerald     4  elev BNP, no clinical CHF, secondary afib/rvr     Plan: Will increase sotalol to 120 mg b i d , rates continue to be fast and will start IV Cardizem as well to maintain rates below 120  If no spontaneous cardioversion with increased sotalol and continue anticoagulation will keep NPO for cardioversion tomorrow      Ultimately, discussed ablation should she be refractory to antiarrhythmic therapy and consideration electrophysiology evaluation for that     In the meantime, will obtain 2D echocardiogram

## 2018-09-16 NOTE — PROGRESS NOTES
Telemetry monitor showed multiple short intervals of V Tach  Pt is asymptomatic sleeping/resting comfortably  On call FLAKITA JASON notified  No new orders given at this point  Will check magnesium blood level in the morning per SLIM PA  Safety mesaures are in place  Will continue to monitor pt

## 2018-09-16 NOTE — PLAN OF CARE
CARDIOVASCULAR - ADULT     Maintains optimal cardiac output and hemodynamic stability Progressing     Absence of cardiac dysrhythmias or at baseline rhythm Progressing        DISCHARGE PLANNING     Discharge to home or other facility with appropriate resources Progressing        METABOLIC, FLUID AND ELECTROLYTES - ADULT     Electrolytes maintained within normal limits Progressing        PAIN - ADULT     Verbalizes/displays adequate comfort level or baseline comfort level Progressing        Potential for Falls     Patient will remain free of falls Progressing

## 2018-09-17 VITALS
RESPIRATION RATE: 16 BRPM | BODY MASS INDEX: 28.44 KG/M2 | TEMPERATURE: 98.2 F | SYSTOLIC BLOOD PRESSURE: 131 MMHG | DIASTOLIC BLOOD PRESSURE: 60 MMHG | HEART RATE: 63 BPM | OXYGEN SATURATION: 95 % | HEIGHT: 63 IN | WEIGHT: 160.5 LBS

## 2018-09-17 LAB
ATRIAL RATE: 60 BPM
ATRIAL RATE: 62 BPM
ATRIAL RATE: 63 BPM
ATRIAL RATE: 64 BPM
P AXIS: 2 DEGREES
P AXIS: 38 DEGREES
P AXIS: 44 DEGREES
P AXIS: 75 DEGREES
PR INTERVAL: 150 MS
PR INTERVAL: 166 MS
PR INTERVAL: 166 MS
PR INTERVAL: 188 MS
QRS AXIS: 29 DEGREES
QRS AXIS: 45 DEGREES
QRS AXIS: 51 DEGREES
QRS AXIS: 52 DEGREES
QRSD INTERVAL: 86 MS
QRSD INTERVAL: 86 MS
QRSD INTERVAL: 90 MS
QRSD INTERVAL: 94 MS
QT INTERVAL: 466 MS
QT INTERVAL: 474 MS
QT INTERVAL: 498 MS
QT INTERVAL: 500 MS
QTC INTERVAL: 472 MS
QTC INTERVAL: 489 MS
QTC INTERVAL: 500 MS
QTC INTERVAL: 509 MS
T WAVE AXIS: -6 DEGREES
T WAVE AXIS: 21 DEGREES
T WAVE AXIS: 23 DEGREES
T WAVE AXIS: 3 DEGREES
VENTRICULAR RATE: 60 BPM
VENTRICULAR RATE: 62 BPM
VENTRICULAR RATE: 63 BPM
VENTRICULAR RATE: 64 BPM

## 2018-09-17 PROCEDURE — 99239 HOSP IP/OBS DSCHRG MGMT >30: CPT | Performed by: INTERNAL MEDICINE

## 2018-09-17 PROCEDURE — 93005 ELECTROCARDIOGRAM TRACING: CPT

## 2018-09-17 PROCEDURE — 99232 SBSQ HOSP IP/OBS MODERATE 35: CPT | Performed by: INTERNAL MEDICINE

## 2018-09-17 PROCEDURE — 93010 ELECTROCARDIOGRAM REPORT: CPT | Performed by: INTERNAL MEDICINE

## 2018-09-17 PROCEDURE — 93306 TTE W/DOPPLER COMPLETE: CPT | Performed by: INTERNAL MEDICINE

## 2018-09-17 RX ORDER — SOTALOL HYDROCHLORIDE 120 MG/1
120 TABLET ORAL EVERY 12 HOURS SCHEDULED
Qty: 60 TABLET | Refills: 0 | Status: SHIPPED | OUTPATIENT
Start: 2018-09-17 | End: 2018-10-09 | Stop reason: SDUPTHER

## 2018-09-17 RX ADMIN — AMLODIPINE BESYLATE 2.5 MG: 2.5 TABLET ORAL at 09:28

## 2018-09-17 RX ADMIN — FERROUS GLUCONATE TAB 324 MG (37.5 MG ELEMENTAL IRON) 324 MG: 324 (37.5 FE) TAB at 09:29

## 2018-09-17 RX ADMIN — EZETIMIBE 10 MG: 10 TABLET ORAL at 09:28

## 2018-09-17 RX ADMIN — APIXABAN 5 MG: 5 TABLET, FILM COATED ORAL at 09:28

## 2018-09-17 RX ADMIN — LOSARTAN POTASSIUM 100 MG: 50 TABLET ORAL at 09:28

## 2018-09-17 RX ADMIN — OXYCODONE HYDROCHLORIDE AND ACETAMINOPHEN 500 MG: 500 TABLET ORAL at 09:28

## 2018-09-17 RX ADMIN — TRAMADOL HYDROCHLORIDE 50 MG: 50 TABLET, COATED ORAL at 09:31

## 2018-09-17 RX ADMIN — Medication 400 MCG: at 09:29

## 2018-09-17 RX ADMIN — SOTALOL HYDROCHLORIDE 120 MG: 80 TABLET ORAL at 09:28

## 2018-09-17 NOTE — DISCHARGE INSTRUCTIONS
To use up your existing sotalol pills (80 mg tablets) you may take 1 and a half tablets twice daily  This will amt to 120 mg twice daily  I have also given you new script for 120 mg tablets if needed

## 2018-09-17 NOTE — ASSESSMENT & PLAN NOTE
· Patient was noted to present with AFib with RVR  · Take sotalol 80 mg p o  b i d  at home  Was seen by Cardiology on the morning of 9/16/2018, sotalol was increased to 120 mg p o  b i d , echocardiogram was ordered, Cardizem drip was started, patient was then noted to convert to normal sinus rhythm after a 6 second pause  Cardizem was discontinued  · Repeat EKG after 2 doses higher dose sotalol shows  ms  Reviewed personally and shown to Dr Pate Conception of cardiology, ana for dc on increased dose of sotalol   · Outpatient follow-up with Cardiology, Dr David Tran  · Patient has been on anticoagulation without interruption

## 2018-09-17 NOTE — PROGRESS NOTES
Cardiology Progress Note - Claire Boone 75 y o  female MRN: [id number]    Unit/Bed#: -01 Encounter: [id number]        Subjective:    No significant events overnight  No chest pain or dyspnea at rest      ROS    Objective:   Vitals: Blood pressure 131/60, pulse 63, temperature 98 2 °F (36 8 °C), temperature source Oral, resp  rate 16, height 5' 3" (1 6 m), weight 72 8 kg (160 lb 7 9 oz), SpO2 95 %  , Body mass index is 28 43 kg/m² , Orthostatic Blood Pressures      Most Recent Value   Blood Pressure  131/60 filed at 09/17/2018 0816   Patient Position - Orthostatic VS  Lying filed at 09/17/2018 4595         Systolic (09RNO), GAU:932 , Min:131 , PEU:581     Diastolic (18ZTV), CPE:52, Min:60, Max:86      Intake/Output Summary (Last 24 hours) at 09/17/18 1118  Last data filed at 09/16/18 1833   Gross per 24 hour   Intake              120 ml   Output                0 ml   Net              120 ml     Weight (last 2 days)     Date/Time   Weight    09/15/18 1206  72 8 (160 5)    09/15/18 0959  73 3 (161 6)                  Physical Exam   Constitutional: She is oriented to person, place, and time  No distress  HENT:   Mouth/Throat: No oropharyngeal exudate  Eyes: No scleral icterus  Neck: No JVD present  Cardiovascular: Normal rate and regular rhythm  No murmur heard  Pulmonary/Chest: Effort normal and breath sounds normal  No respiratory distress  She has no wheezes  She has no rales  Abdominal: Soft  Bowel sounds are normal  She exhibits no distension  There is no tenderness  There is no rebound  Musculoskeletal: She exhibits no edema  Neurological: She is alert and oriented to person, place, and time  Skin: Skin is warm and dry  She is not diaphoretic  Psychiatric: She has a normal mood and affect   Her behavior is normal          Laboratory Results:    Results from last 7 days  Lab Units 09/15/18  1556 09/15/18  1235 09/15/18  1015   TROPONIN I ng/mL <0 02 <0 02 <0 02       CBC with diff: Results from last 7 days  Lab Units 09/16/18  0501 09/15/18  1015 09/11/18  1011   WBC Thousand/uL 6 87 6 84 6 49   HEMOGLOBIN g/dL 14 3 14 2 13 0   HEMATOCRIT % 43 5 42 1 40 0   MCV fL 94 93 95   PLATELETS Thousands/uL 275 263 248   MCH pg 30 8 31 2 30 7   MCHC g/dL 32 9 33 7 32 5   RDW % 13 0 12 8 12 7   MPV fL 10 3 9 9 10 5   NRBC AUTO /100 WBCs 0 0 0         CMP:  Results from last 7 days  Lab Units 09/16/18  0501 09/15/18  1015 09/11/18  1011   SODIUM mmol/L 142 139 138   POTASSIUM mmol/L 4 0 4 1 4 5   CHLORIDE mmol/L 105 103 103   CO2 mmol/L 27 26 29   BUN mg/dL 18 20 19   CREATININE mg/dL 0 93 0 98 0 86   CALCIUM mg/dL 9 6 9 5 9 5   AST U/L  --   --  21   ALT U/L  --   --  29   ALK PHOS U/L  --   --  58   EGFR ml/min/1 73sq m 61 57 67         BMP:  Results from last 7 days  Lab Units 09/16/18  0501 09/15/18  1015 09/11/18  1011   SODIUM mmol/L 142 139 138   POTASSIUM mmol/L 4 0 4 1 4 5   CHLORIDE mmol/L 105 103 103   CO2 mmol/L 27 26 29   BUN mg/dL 18 20 19   CREATININE mg/dL 0 93 0 98 0 86   CALCIUM mg/dL 9 6 9 5 9 5       BNP: No results for input(s): BNP in the last 72 hours      Magnesium:   Results from last 7 days  Lab Units 09/16/18  0501   MAGNESIUM mg/dL 2 0       Coags:   Results from last 7 days  Lab Units 09/11/18  1011   PTT seconds 31   INR  1 15       TSH: No results found for: TSH    Hemoglobin A1C   Results from last 7 days  Lab Units 09/11/18  1011   HEMOGLOBIN A1C % 6 5*       Lipid Profile:   Results from last 7 days  Lab Units 09/16/18  0501   TRIGLYCERIDES mg/dL 109   HDL mg/dL 59       Cardiac testing:   Results for orders placed during the hospital encounter of 09/15/18   Echo complete with contrast if indicated    Narrative [facility], [address] [facility]  [phone]    Transthoracic Echocardiogram  2D, M-mode, Doppler, and Color Doppler    Study date:  16-Sep-2018    Patient: Joseph Cruz  MR number: [id number]  Account number: [id number]  : 1945  Age: 75 years  Gender: Female  Status: Inpatient  Location: Bedside  Height: 63 in  Weight: 159 7 lb  BP: 130/ 88 mmHg    Indications: Atrial Fibrillation    Diagnoses: I48 0 - Atrial fibrillation    Sonographer:  Reggie Chang RDCS  Primary Physician:  Rufus Cox MD  Referring Physician:  Rodney Fontaine DO  Group:  [facility]  Interpreting Physician:  Sheree Max MD    SUMMARY    LEFT VENTRICLE:  Systolic function was normal by visual assessment  Ejection fraction was estimated to be 60 %  Although no diagnostic regional wall motion abnormality was identified, this possibility cannot be completely excluded on the basis of this study  Features were consistent with a pseudonormal left ventricular filling pattern, with concomitant abnormal relaxation and increased filling pressure (grade 2 diastolic dysfunction)  RIGHT VENTRICLE:  Systolic pressure was mildly increased  Estimated peak pressure was 38 mmHg  LEFT ATRIUM:  The atrium was mildly dilated  MITRAL VALVE:  There was mild regurgitation  TRICUSPID VALVE:  There was mild regurgitation  PULMONIC VALVE:  There was mild regurgitation  HISTORY: PRIOR HISTORY: Afib, Hypertension    PROCEDURE: The procedure was performed at the bedside  This was a routine study  The transthoracic approach was used  The study included complete 2D imaging, M-mode, complete spectral Doppler, and color Doppler  The heart rate was 66 bpm,  at the start of the study  Images were obtained from the parasternal, apical, subcostal, and suprasternal notch acoustic windows  Image quality was adequate  LEFT VENTRICLE: Size was normal  Systolic function was normal by visual assessment  Ejection fraction was estimated to be 60 %  Although no diagnostic regional wall motion abnormality was identified, this possibility cannot be completely  excluded on the basis of this study   Wall thickness was normal  DOPPLER: The ratio of early ventricular filling to atrial contraction velocities was within the normal range  Features were consistent with a pseudonormal left ventricular  filling pattern, with concomitant abnormal relaxation and increased filling pressure (grade 2 diastolic dysfunction)  RIGHT VENTRICLE: The size was normal  Systolic function was normal  DOPPLER: Systolic pressure was mildly increased  Estimated peak pressure was 38 mmHg  LEFT ATRIUM: The atrium was mildly dilated  RIGHT ATRIUM: Size was normal     MITRAL VALVE: Valve structure was normal  There was normal leaflet separation  DOPPLER: The transmitral velocity was within the normal range  There was no evidence for stenosis  There was mild regurgitation  AORTIC VALVE: The valve was trileaflet  Leaflets exhibited normal thickness and normal cuspal separation  DOPPLER: Transaortic velocity was within the normal range  There was no evidence for stenosis  There was no regurgitation  TRICUSPID VALVE: The valve structure was normal  There was normal leaflet separation  DOPPLER: The transtricuspid velocity was within the normal range  There was no evidence for stenosis  There was mild regurgitation  PULMONIC VALVE: Leaflets exhibited normal thickness, no calcification, and normal cuspal separation  DOPPLER: The transpulmonic velocity was within the normal range  There was mild regurgitation  PERICARDIUM: There was no pericardial effusion  AORTA: The root exhibited normal size  SYSTEMIC VEINS: IVC: The inferior vena cava was normal in size and course   Respirophasic changes were normal     SYSTEM MEASUREMENT TABLES    2D  %FS: 29 23 %  AV Diam: 2 59 cm  EDV(Teich): 122 4 ml  EF(Teich): 55 78 %  ESV(Teich): 54 13 ml  IVSd: 1 cm  LA Area: 21 32 cm2  LA Diam: 4 13 cm  LVEDV MOD A4C: 66 53 ml  LVEF MOD A4C: 72 63 %  LVESV MOD A4C: 18 21 ml  LVIDd: 5 07 cm  LVIDs: 3 59 cm  LVLd A4C: 7 37 cm  LVLs A4C: 5 72 cm  LVPWd: 0 96 cm  RA Area: 15 81 cm2  RVIDd: 2 78 cm  SV MOD A4C: 48 32 ml  SV(Teich): 68 27 ml    CW  TR MaxP 84 mmHg  TR Vmax: 2 91 m/s    MM  TAPSE: 1 63 cm    PW  E': 0 07 m/s  E/E': 11 94  MV A Chaz: 0 73 m/s  MV Dec [place]: 3 79 m/s2  MV DecT: 216 12 ms  MV E Chaz: 0 82 m/s  MV E/A Ratio: 1 12  MV PHT: 62 67 ms  MVA By PHT: 3 51 cm2    [employer] Accredited Echocardiography Laboratory    Prepared and electronically signed by    Taj Jimenez MD  Signed 17-Sep-2018 08:58:26       No results found for this or any previous visit  No results found for this or any previous visit  No results found for this or any previous visit      Meds/Allergies   current meds:   Current Facility-Administered Medications   Medication Dose Route Frequency    amLODIPine (NORVASC) tablet 2 5 mg  2 5 mg Oral Daily    apixaban (ELIQUIS) tablet 5 mg  5 mg Oral BID    ascorbic acid (VITAMIN C) tablet 500 mg  500 mg Oral BID    ezetimibe (ZETIA) tablet 10 mg  10 mg Oral Daily    ferrous gluconate (FERGON) tablet 324 mg  324 mg Oral BID    folic acid (FOLVITE) tablet 400 mcg  400 mcg Oral Daily    gabapentin (NEURONTIN) capsule 600 mg  600 mg Oral HS    losartan (COZAAR) tablet 100 mg  100 mg Oral Daily    rOPINIRole (REQUIP) tablet 0 25 mg  0 25 mg Oral HS    sotalol (BETAPACE) tablet 120 mg  120 mg Oral Q12H JOAQUINA    traMADol (ULTRAM) tablet 50 mg  50 mg Oral Q6H PRN    zolpidem (AMBIEN) tablet 10 mg  10 mg Oral HS PRN     Prescriptions Prior to Admission   Medication    amLODIPine (NORVASC) 2 5 mg tablet    apixaban (ELIQUIS) 5 mg    ascorbic acid (VITAMIN C) 500 mg tablet    B Complex-C-Folic Acid (B COMPLEX + C TR) TBCR    Black Cohosh 20 MG TABS    Cholecalciferol ([employer] VITAMIN D) 2000 UNITS CAPS    Chromium Picolinate POWD    ezetimibe (ZETIA) 10 mg tablet    ferrous gluconate (FERGON) 324 mg tablet    folic acid (FOLVITE) 701 MCG tablet    gabapentin (NEURONTIN) 300 mg capsule    losartan (COZAAR) 100 MG tablet    rOPINIRole (REQUIP) 0 25 mg tablet    sotalol (BETAPACE) 80 mg tablet    traMADol (ULTRAM) 50 mg tablet    zolpidem (AMBIEN) 10 mg tablet          Assessment:  Principal Problem:    Atrial fibrillation with rapid ventricular response (HCC)  Active Problems:    Essential hypertension    Dyslipidemia    Chest pain    Plan:    Atrial Fibrillation: Remains in NSR on Tele  Continue sotalol and Eliquis  Her Rhythm looks fine on tele  Outpatient followup  Office will call her  Counseling / Coordination of Care  Total floor / unit time spent today 25 minutes  Greater than 50% of total time was spent with the patient and / or family counseling and / or coordination of care  A description of the counseling / coordination of care

## 2018-09-18 ENCOUNTER — TRANSITIONAL CARE MANAGEMENT (OUTPATIENT)
Dept: FAMILY MEDICINE CLINIC | Facility: CLINIC | Age: 73
End: 2018-09-18

## 2018-09-18 LAB
ATRIAL RATE: 214 BPM
QRS AXIS: 73 DEGREES
QRSD INTERVAL: 82 MS
QT INTERVAL: 336 MS
QTC INTERVAL: 450 MS
T WAVE AXIS: -41 DEGREES
VENTRICULAR RATE: 108 BPM

## 2018-09-18 PROCEDURE — 93010 ELECTROCARDIOGRAM REPORT: CPT | Performed by: INTERNAL MEDICINE

## 2018-09-19 ENCOUNTER — CLINICAL SUPPORT (OUTPATIENT)
Dept: CARDIOLOGY CLINIC | Facility: CLINIC | Age: 73
End: 2018-09-19
Payer: MEDICARE

## 2018-09-19 VITALS
HEIGHT: 63 IN | WEIGHT: 160 LBS | DIASTOLIC BLOOD PRESSURE: 70 MMHG | BODY MASS INDEX: 28.35 KG/M2 | HEART RATE: 54 BPM | SYSTOLIC BLOOD PRESSURE: 112 MMHG

## 2018-09-19 DIAGNOSIS — I48.91 ATRIAL FIBRILLATION, UNSPECIFIED TYPE (HCC): Primary | ICD-10-CM

## 2018-09-19 PROCEDURE — 93000 ELECTROCARDIOGRAM COMPLETE: CPT | Performed by: INTERNAL MEDICINE

## 2018-09-19 NOTE — PROGRESS NOTES
Patient came in today for nurse visit as per Sr Whiteside, C/O palpitations this morning but went away, reason for today's visit was to follow up on increase of her Sotalol  Dr Stephens reviewed findings sending patient home  with no changes

## 2018-09-25 ENCOUNTER — OFFICE VISIT (OUTPATIENT)
Dept: CARDIOLOGY CLINIC | Facility: CLINIC | Age: 73
End: 2018-09-25
Payer: MEDICARE

## 2018-09-25 VITALS
WEIGHT: 162.6 LBS | HEIGHT: 62 IN | SYSTOLIC BLOOD PRESSURE: 156 MMHG | DIASTOLIC BLOOD PRESSURE: 76 MMHG | HEART RATE: 55 BPM | BODY MASS INDEX: 29.92 KG/M2

## 2018-09-25 DIAGNOSIS — I48.91 ATRIAL FIBRILLATION, UNSPECIFIED TYPE (HCC): Primary | ICD-10-CM

## 2018-09-25 PROCEDURE — 99214 OFFICE O/P EST MOD 30 MIN: CPT | Performed by: NURSE PRACTITIONER

## 2018-09-25 PROCEDURE — 93000 ELECTROCARDIOGRAM COMPLETE: CPT | Performed by: NURSE PRACTITIONER

## 2018-09-25 NOTE — PROGRESS NOTES
Cardiology Office Visit   Silke Rehman 75 y o  female MRN: [id number]    [facility]  Ms Robert Tadeo is a 75year old female with a known past medical history of HTN, Paroxysmal atrial fibrillation on Eliquis, Dyslipidemia, sp Left TKA, and OA of right Knee plan for right knee TKA on 10/04/18 with Dr Barbie Marley  Ms Sabine Aparicio was recently admitted to [facility] on 9/15-9/17/18 with Atrial fibrillation with RVR  She presented to the ER with a 2 day history with the feeling of her heart racing  EKG confirmed atrial fibrillation at 108 BPM   She was started on IV Cardizem  Sotalol was increased from 80mg BID to 120mg BID  Telemetry showed rhythm converted to NSR after a 6 second pause  Cardizem was discontinued  TTE showed LV systolic function normal, LVEF 09%, grade 2 diastolic dysfunction, RV systolic pressure mildly increased, PAP 38mmHg, LA mildly dilated, mild MR, mild TR   TSH WNL, Lipid panel showed Cholesterol 202, Triglycerides 109, HDL 59,   She continued on Zetia  Ms Sabine Aparicio was discharged home  Today Rebecca Pérez presents to our office for a recent hospitalization follow up visit  She denies, CP, dyspnea with minimal or moderate exertion, lightheadedness or dizziness  Rebecca Pérez admits to occasional palpitations that last 1-2 minutes at the most   She is attempting to follow a 2 gm sodium low fat low cholesterol diet   EKG confirms sinus bradycardia      Patient Active Problem List   Diagnosis    Atrial fibrillation with rapid ventricular response (HCC)    Essential hypertension    Dyslipidemia    Sacroiliitis (HCC)    Chronic pain of right knee    Left hip pain    Trochanteric bursitis of left hip    Chest pain       ROS- Pain in right knee with sitting        Objective:   Vitals: RUE sitting 130/80  Vitals:    09/25/18 0922   BP: 156/76   BP Location: Right arm   Patient Position: Sitting   Cuff Size: Standard   Pulse: 55   Weight: 73 8 kg (162 lb 9 6 oz)   Height: 5' 2" (1 575 m) Body mass index is 29 74 kg/m²  Wt Readings from Last 3 Encounters:   09/25/18 73 8 kg (162 lb 9 6 oz)   09/19/18 72 6 kg (160 lb)   09/15/18 72 8 kg (160 lb 7 9 oz)         Physical Exam:  GEN: Zander Fox appears well, alert and oriented x 3, pleasant and cooperative   HEENT: pupils equal, round, and reactive to light; extraocular muscles intact  NECK: supple, no carotid bruits   HEART: regular rhythm, normal S1 and S2, no murmurs, clicks, gallops or rubs, JVP is  flat  LUNGS: clear to auscultation bilaterally; no wheezes, rales, or rhonchi   ABDOMEN: normal bowel sounds, soft, no tenderness, no distention  EXTREMITIES: peripheral pulses normal; no clubbing, cyanosis, or edema  NEURO: no focal findings   SKIN: normal without suspicious lesions on exposed skin      Current Outpatient Prescriptions:     amLODIPine (NORVASC) 2 5 mg tablet, Take 1 tablet (2 5 mg total) by mouth daily, Disp: 90 tablet, Rfl: 3    apixaban (ELIQUIS) 5 mg, Take 1 tablet (5 mg total) by mouth 2 (two) times a day, Disp: 56 tablet, Rfl: 0    ascorbic acid (VITAMIN C) 500 mg tablet, Take 500 mg by mouth 2 (two) times a day, Disp: , Rfl:     B Complex-C-Folic Acid (B COMPLEX + C TR) TBCR, Take by mouth daily  , Disp: , Rfl:     Black Cohosh 20 MG TABS, Take by mouth, Disp: , Rfl:     Cholecalciferol (CVS VITAMIN D) 2000 UNITS CAPS, Take 2,000 Units by mouth 2 (two) times a day  , Disp: , Rfl:     Chromium Picolinate POWD, Take by mouth daily  , Disp: , Rfl:     ezetimibe (ZETIA) 10 mg tablet, TAKE 1 TABLET BY MOUTH  DAILY, Disp: 90 tablet, Rfl: 3    ferrous gluconate (FERGON) 324 mg tablet, Take 324 mg by mouth 2 (two) times a day, Disp: , Rfl:     folic acid (FOLVITE) 177 MCG tablet, Take 400 mcg by mouth daily, Disp: , Rfl:     gabapentin (NEURONTIN) 300 mg capsule, 2 capsules at bedtime  , Disp: 180 capsule, Rfl: 1    losartan (COZAAR) 100 MG tablet, Losartan Potassium 100 MG Oral Tablet take 1 tablet by mouth once daily  Quantity: 90;  Refills: 3    Jhonathan PATRICIA MD;  Arabella Median 16-Oct-2011 Active, Disp: , Rfl:     rOPINIRole (REQUIP) 0 25 mg tablet, TAKE ONE TABLET BY MOUTH AT BEDTIME , Disp: 30 tablet, Rfl: 5    sotalol (BETAPACE) 120 mg tablet, Take 1 tablet (120 mg total) by mouth every 12 (twelve) hours, Disp: 60 tablet, Rfl: 0    traMADol (ULTRAM) 50 mg tablet, TAKE 1 TABLET BY MOUTH TWO  TIMES DAILY AS NEEDED, Disp: 180 tablet, Rfl: 1    zolpidem (AMBIEN) 10 mg tablet, Take 1 tablet (10 mg total) by mouth daily at bedtime as needed for sleep, Disp: 23 tablet, Rfl: 3              EKG personally reviewed by JENNY Ahuja  Assessment/Plan:   1  Paroxysmal atrial fibrillation- EKG confirms Sinus bradycardia 55 BPM, denies lightheadedness dizziness  , continue on Sotalol 120mg BID, Eliquis 5mg BID, refer to EP if atrial fibrillation re occurs for possible ablation  2  HTN- controlled on Norvasc 2 5mg daily   2 gm sodium diet     3  Dyslipidemia continue on Zetia, low fat low cholesterol diet  4   Palpitations- I have instructed Nevin Quintero to call our office if palpitations worsen  5  OA right knee for TKA on 10/04/18     Kenton Ahuja  9/25/2018  12:35 PM

## 2018-09-26 ENCOUNTER — EVALUATION (OUTPATIENT)
Dept: PHYSICAL THERAPY | Facility: MEDICAL CENTER | Age: 73
End: 2018-09-26
Payer: MEDICARE

## 2018-09-26 DIAGNOSIS — M17.11 LOCALIZED OSTEOARTHRITIS OF RIGHT KNEE: Primary | ICD-10-CM

## 2018-09-26 PROCEDURE — G8991 OTHER PT/OT GOAL STATUS: HCPCS | Performed by: PHYSICAL THERAPIST

## 2018-09-26 PROCEDURE — G8990 OTHER PT/OT CURRENT STATUS: HCPCS | Performed by: PHYSICAL THERAPIST

## 2018-09-26 PROCEDURE — 97161 PT EVAL LOW COMPLEX 20 MIN: CPT | Performed by: PHYSICAL THERAPIST

## 2018-09-26 NOTE — PROGRESS NOTES
PT Evaluation  and PT Discharge    Today's date: 2018  Patient name: Tess Samson  : 1945  MRN: [id number]  Referring provider: Justin Carrillo MD  Dx:   Encounter Diagnosis     ICD-10-CM    1  Localized osteoarthritis of right knee M17 11                   Assessment  Impairments: abnormal gait, abnormal or restricted ROM, abnormal movement, activity intolerance, impaired physical strength, lacks appropriate home exercise program and pain with function    Assessment details: Tess Samson is a 75 y o  female who presents with Localized osteoarthritis of right knee  (primary encounter diagnosis)  Patient presents alert and oriented with the above impairments  Yosi Tracy will benefit from PT to addres deficits in order to maximize and return to prior level of function  No further referral appears necessary at this time based upon examination results  Prognosis is good given HEP compliance  Please contact me if you have any questions or recommendations  Understanding of Dx/Px/POC: excellent  Goals  Short Term Goals:   1  Pain decreased 2 subjective ratings in 4 weeks  2  Increased knee flexion 10* in 4 weeks  3  Increased knee extension 5* in 4 weeks  4  Strength improved 1/2 grade in 4 weeks    Long Term Goals:  1  Ambulating without AD 8 weeks  2  Stair climbing with reciprocally in 8 weeks  3    Independent with HEP    Plan  Patient would benefit from: skilled physical therapy  Planned modality interventions: cryotherapy  Planned therapy interventions: balance, flexibility, functional ROM exercises, gait training, therapeutic exercise, stretching, strengthening, patient education, neuromuscular re-education, manual therapy and home exercise program  Frequency: 2x week  Duration in weeks: 12  Treatment plan discussed with: patient and family        Subjective Evaluation    History of Present Illness  Mechanism of injury: Pt reports ongoing R knee pain w/ pulling in posterior aspect that has been present for at least a year  She has been seeing Dr Jessenia Fitzgerald and receiving injections which were providing relief  She recently decided to schedule TKR for Oct 8  She presents today w/ reports of intermittent pain in R knee  Pain is most severe at the end of the day  She also reports increased pain w/ stairs, prolonged weight bearing  At this time she is not using AD for ambulation  She does have wheeled walker and SPC for use post-operatively  She lives in ranch home w/ her  w/ a few steps to enter (1 high, 3 low)  No sleep disturbance reported  She is retired  Pain  At best pain ratin  At worst pain ratin  Location: R knee    Patient Goals  Patient goals for therapy: decreased pain, increased motion, increased strength and independence with ADLs/IADLs          Objective     Active Range of Motion   Left Knee   Flexion: 95 degrees   Extension: 0 degrees     Right Knee   Flexion: 88 degrees   Extension: -2 degrees     Strength/Myotome Testing     Left Hip   Planes of Motion   Flexion: 4-  Extension: 3+  Abduction: 4-    Right Hip   Planes of Motion   Flexion: 3+  Extension: 3+  Abduction: 3+    Left Knee   Prone flexion: 4-  Extension: 5    Right Knee   Prone flexion: 3+  Extension: 4-    Ambulation     Comments   No significant gait deviations at this time  Pt instructed on ambulation w/ wheeled walker today  Adjusted to proper height  Precautions: fibromyalgia, a-fib, L TKR    Daily Treatment Diary     Manual                                                                                   Exercise Diary                                                                                                                                                                                                                                                                                      Modalities                                                       Pt cancelled surgery

## 2018-10-01 ENCOUNTER — TELEPHONE (OUTPATIENT)
Dept: OBGYN CLINIC | Facility: HOSPITAL | Age: 73
End: 2018-10-01

## 2018-10-02 ENCOUNTER — TELEPHONE (OUTPATIENT)
Dept: OBGYN CLINIC | Facility: HOSPITAL | Age: 73
End: 2018-10-02

## 2018-10-02 NOTE — TELEPHONE ENCOUNTER
Call placed to pt to notify her of her new OR date  Pt's new OR date is 10/18 due to her recent inpt admission  Pt is aware of her new OR date 10/18  Pt reports "that actually works better for me so that my daughter can be there for me, she just found out she has a work trip next week so the 18th works better"  Pt denies having any questions or concerns at this time  Pt encouraged to call me with any questions, concerns or issues

## 2018-10-04 ENCOUNTER — TELEPHONE (OUTPATIENT)
Dept: CARDIOLOGY CLINIC | Facility: CLINIC | Age: 73
End: 2018-10-04

## 2018-10-04 ENCOUNTER — OFFICE VISIT (OUTPATIENT)
Dept: FAMILY MEDICINE CLINIC | Facility: CLINIC | Age: 73
End: 2018-10-04
Payer: MEDICARE

## 2018-10-04 VITALS
WEIGHT: 159 LBS | TEMPERATURE: 98 F | DIASTOLIC BLOOD PRESSURE: 80 MMHG | HEART RATE: 62 BPM | RESPIRATION RATE: 16 BRPM | SYSTOLIC BLOOD PRESSURE: 178 MMHG | BODY MASS INDEX: 28.17 KG/M2 | HEIGHT: 63 IN

## 2018-10-04 DIAGNOSIS — Z01.818 PREOPERATIVE CLEARANCE: Primary | ICD-10-CM

## 2018-10-04 DIAGNOSIS — M85.852 OSTEOPENIA OF NECK OF LEFT FEMUR: ICD-10-CM

## 2018-10-04 DIAGNOSIS — I48.0 PAROXYSMAL ATRIAL FIBRILLATION (HCC): ICD-10-CM

## 2018-10-04 DIAGNOSIS — I10 ESSENTIAL HYPERTENSION: ICD-10-CM

## 2018-10-04 DIAGNOSIS — M17.11 PRIMARY OSTEOARTHRITIS OF RIGHT KNEE: ICD-10-CM

## 2018-10-04 PROBLEM — M26.629 TMJ SYNDROME: Status: ACTIVE | Noted: 2017-10-18

## 2018-10-04 PROBLEM — G89.29 CHRONIC PAIN OF RIGHT KNEE: Status: RESOLVED | Noted: 2018-03-09 | Resolved: 2018-10-04

## 2018-10-04 PROBLEM — M25.552 LEFT HIP PAIN: Status: RESOLVED | Noted: 2018-03-09 | Resolved: 2018-10-04

## 2018-10-04 PROBLEM — M70.62 TROCHANTERIC BURSITIS OF LEFT HIP: Status: RESOLVED | Noted: 2018-03-09 | Resolved: 2018-10-04

## 2018-10-04 PROBLEM — R07.9 CHEST PAIN: Status: RESOLVED | Noted: 2018-09-15 | Resolved: 2018-10-04

## 2018-10-04 PROBLEM — M25.561 CHRONIC PAIN OF RIGHT KNEE: Status: RESOLVED | Noted: 2018-03-09 | Resolved: 2018-10-04

## 2018-10-04 PROCEDURE — 99214 OFFICE O/P EST MOD 30 MIN: CPT | Performed by: FAMILY MEDICINE

## 2018-10-04 NOTE — PROGRESS NOTES
Assessment/Plan:     Diagnoses and all orders for this visit:    Preoperative clearance    Primary osteoarthritis of right knee    Essential hypertension    Paroxysmal atrial fibrillation (HCC)    Osteopenia of neck of left femur    Other orders  -     Chromium Picolinate (CHROMIUM PICOLATE PO); Take 1 tablet by mouth daily        Patient medically cleared for surgery  repeat /80 left arm sitting with large cuff  Patient instructed to take her blood pressure medications on the morning of surgery with sips of water  She was cleared medically for surgery by Cardiology with instructions on when to discontinue Eliquis  Patient ID: Ang Capps is a 75 y o  female  76-year-old female here for preoperative evaluation  Diagnosis OA right knee  Procedure right TKR  Surgeon Dr Adolph Durham  Date of surgery 10/18/2018  No problems with anesthesia  Active medical problems and past medical history see chart  Admission 9/15 through 9/17/2018 atrial fibrillation with rapid ventricular response  Patient converted to normal sinus rhythm IV Cardizem  Dose of Sotalol was increased from 80 mg b i d  to 120 mg b i d   On Eliquis 5 mg b i d  Hypertension on Amlodipine 2 5 mg daily and Losartan 100 mg daily  Admission labs  Electrolytes normal  creatinine 0 98  Random blood glucose 141  TSH 1 077  CBC WBC 6,840  Hemoglobin 14 2  Platelet count 561,398  Troponin x 3 normal   Magnesium 2 0   Echocardiogram normal left ventricular systolic function  EF 60%  Grade 2 diastolic dysfunction  Right ventricle systolic pressure mildly increased  Mildly dilated left atrium  Mild MR/TR    No pericardial effusion        The following portions of the patient's history were reviewed and updated as appropriate: allergies, current medications, past family history, past medical history, past social history, past surgical history and problem list     Review of Systems   Constitutional: Negative for appetite change, chills, fatigue, fever and unexpected weight change  HENT: Positive for hearing loss  Negative for congestion, ear pain, postnasal drip, rhinorrhea, sore throat and trouble swallowing  Eyes: Negative for visual disturbance  Respiratory: Negative for cough, shortness of breath and wheezing  Cardiovascular: Negative for chest pain, palpitations and leg swelling  Gastrointestinal: Negative for abdominal pain, blood in stool, constipation, diarrhea, nausea and vomiting  Genitourinary: Negative for difficulty urinating  Musculoskeletal: Positive for arthralgias  See HPI   Skin: Negative for rash  Neurological: Negative for dizziness and headaches  Hematological: Negative for adenopathy  Bruises/bleeds easily (on Eliquis  )  Psychiatric/Behavioral: Negative for dysphoric mood and sleep disturbance  Objective:      BP (!) 178/80   Pulse 62   Temp 98 °F (36 7 °C)   Resp 16   Ht 5' 3" (1 6 m)   Wt 72 1 kg (159 lb)   BMI 28 17 kg/m²          Physical Exam   Constitutional: She is oriented to person, place, and time  She appears well-developed and well-nourished  No distress  HENT:   Mouth/Throat: Oropharynx is clear and moist and mucous membranes are normal  No oral lesions  Normal dentition  Eyes: Pupils are equal, round, and reactive to light  Conjunctivae and EOM are normal  No scleral icterus  Neck: No JVD present  Carotid bruit is not present  No tracheal deviation present  No thyroid mass and no thyromegaly present  Cardiovascular: Normal rate, regular rhythm and normal heart sounds  Exam reveals no gallop  No murmur heard  Pulmonary/Chest: Effort normal and breath sounds normal  No respiratory distress  She has no wheezes  She has no rales  Abdominal: Soft  Bowel sounds are normal  She exhibits no distension and no mass  There is no tenderness  There is no rebound and no guarding  Musculoskeletal: She exhibits no edema     Lymphadenopathy:     She has no cervical adenopathy  Neurological: She is alert and oriented to person, place, and time  No cranial nerve deficit  Skin: No rash noted  Psychiatric: She has a normal mood and affect  Nursing note and vitals reviewed  Procedure: Xr Chest 2 Views    Result Date: 9/15/2018  Narrative: CHEST INDICATION:   Palpitations, occasional chest pressure  COMPARISON:  Two-view chest 9/11/2018 EXAM PERFORMED/VIEWS:  XR CHEST PA & LATERAL  The frontal view was performed utilizing dual energy radiographic technique  FINDINGS: Shallow inspiratory result  Cardiomediastinal silhouette appears unremarkable  No airspace consolidation, pneumothorax, pulmonary edema, or large pleural effusion  Bilateral acromioclavicular degenerative disease     Mild thoracolumbar spondylosis  Impression: No radiographic evidence of acute intrathoracic process or significant interval change  Workstation performed: [id number]     Procedure: Xr Chest Pa & Lateral    Result Date: 9/12/2018  Narrative: CHEST INDICATION:   M17 11: Unilateral primary osteoarthritis, right knee M25 561: Pain in right knee G89 29: Other chronic pain  Preop  COMPARISON:  February 16, 2015, February 26, 2010 and December 5, 2008 EXAM PERFORMED/VIEWS:  XR CHEST PA & LATERAL Images: 2 FINDINGS: Cardiomediastinal silhouette appears unremarkable  The lungs are clear  No pneumothorax or pleural effusion  Osseous structures appear within normal limits for patient age  Impression: No acute cardiopulmonary disease  Workstation performed: [id number]     Procedure: Dxa Bone Density Spine Hip And Pelvis    Result Date: 9/14/2018  Narrative: CENTRAL  DXA SCAN CLINICAL HISTORY:   75year old post-menopausal  female  Menopause at age 38  Prior treatment for osteoporosis  On calcium and vitamin D supplementation  TECHNIQUE: Bone densitometry was performed using a Horizon A bone densitometer  Regions of interest appear properly placed   There are no obvious fractures or other confounding variables which could limit the study  COMPARISON:  4/28/2016, prior study was performed at a different site  RESULTS: LUMBAR SPINE:  L1-L4: BMD 0 912 gm/cm2 T-score -1 2 Z-score 1 1 LEFT TOTAL HIP: BMD 0 839 gm/cm2 T-score -0 8 Z-score 0 9 LEFT FEMORAL NECK: BMD 0 684 gm/cm2 T-score -1 5 Z-score 0 5     Impression: 1  Based on the [facility] classification, the T-score of -1 5 in the left femoral neck is consistent with low bone mineral density  2   Since the prior study, there has not been a significant change in bone mineral density of the lumbar spine or the left hip  Please note however that the study was performed at a different site and the prior values may not be entirely comparable  3   Any secondary causes of low bone mineral density should be excluded prior to treatment, if clinically indicated  4   A daily intake of at least 1200 mg calcium and 800 to 1000 IU of Vitamin D, as well as weight bearing and muscle strengthening exercise, fall prevention and avoidance of tobacco and excessive alcohol intake as basic preventive measures are suggested  5   Repeat DXA  in 18 - 24 months, on the same machine, as clinically indicated  The 10 year risk of hip fracture is 2 0%, with the 10 year risk of major osteoporotic fracture being 11%, as calculated by the [facility] fracture risk assessment tool (FRAX)  The current NOF guidelines recommend treating patients with FRAX 10 year risk score of >3% for hip fracture and >20% for major osteoporotic fracture   WHO CLASSIFICATION: Normal (a T-score of -1 0 or higher) Low bone mineral density (a T-score of less than -1 0 but higher than -2 5) Osteoporosis (a T-score of -2 5 or less) Severe osteoporosis (a T-score of -2 5 or less with a fragility fracture)   Workstation performed: [id number]     Procedure: Mammo Screening Bilateral W Cad    Result Date: 9/14/2018  Narrative: Patient History: Patient is postmenopausal  Family history of breast cancer at age 72 in maternal aunt  Took estrogen for 2 years  Patient has never smoked  Patient's BMI is 28 2  Reason for exam: screening, asymptomatic  Mammo Screening Bilateral W CAD: September 14, 2018 - Check In #: [de-identified] Bilateral MLO and CC view(s) were taken  Technologist: Alyn Severance, RT RM Prior study comparison: May 1, 2017, mammo screening bilateral W CAD performed at [address]  April 8, 2016, mammo screening bilateral W CAD, performed at [address]  February 11, 2015, bilateral [facility] digtl scrn mammo w/CAD performed at [address]  January 2, 2014, digital bilateral screening mammogram, performed at [address]  December 13, 2012, digital bilateral screening mammogram, performed at [address]  November 16, 2011, digital bilateral screening mammogram, performed at [address] [facility]  November 10, 2010, digital bilateral screening mammogram, performed at [address] [facility]  There are scattered fibroglandular densities  No dominant soft tissue mass, architectural distortion or suspicious calcifications are noted in either breast   The skin and nipple contours are within normal limits  IMPRESSION:  No mammographic evidence of malignancy  No significant changes when compared with prior studies  ACR BI-RADS® Assessments: BiRad:1 - Negative Recommendation: Routine screening mammogram of both breasts in 1 year  Analyzed by CAD The patient is scheduled in a reminder system for screening mammography  8-10% of cancers will be missed on mammography  Management of a palpable abnormality must be based on clinical grounds  Patients will be notified of their results via letter from our facility  Accredited by [employer] of Radiology and FDA   Transcription Location: [address] [place], [place] [address]way: [id number] Risk Value(s): Tyrer-Cuzick 10 Year: 3 300%, Tyrer-Cuzick Lifetime: 3 700%, Myriad Table: 1 5%, JUDSON 5 Year: 2 0%, NCI Lifetime: 4 5%

## 2018-10-09 DIAGNOSIS — I48.91 ATRIAL FIBRILLATION, RAPID (HCC): ICD-10-CM

## 2018-10-09 RX ORDER — SOTALOL HYDROCHLORIDE 120 MG/1
120 TABLET ORAL EVERY 12 HOURS SCHEDULED
Qty: 180 TABLET | Refills: 3 | Status: SHIPPED | OUTPATIENT
Start: 2018-10-09 | End: 2019-05-16 | Stop reason: SDUPTHER

## 2018-10-10 ENCOUNTER — TELEPHONE (OUTPATIENT)
Dept: CARDIOLOGY CLINIC | Facility: CLINIC | Age: 73
End: 2018-10-10

## 2018-10-10 ENCOUNTER — TELEPHONE (OUTPATIENT)
Dept: OBGYN CLINIC | Facility: HOSPITAL | Age: 73
End: 2018-10-10

## 2018-10-10 NOTE — TELEPHONE ENCOUNTER
----- Message from Amy Marsh sent at 10/10/2018 11:22 AM EDT -----  Regarding: FW: Cardiac Clearance for Ortho      ----- Message -----  From: Gladys Bolanos DO  Sent: 10/4/2018  11:35 AM  To: Amy Marsh  Subject: RE: Cardiac Clearance for Ortho                  Risk remains moderate  Gerhardt Capemadyson  ----- Message -----  From: Amy Marsh  Sent: 10/4/2018   9:10 AM  To: Gladys Bolanos DO  Subject: Cardiac Clearance for Ortho                      Hi Jacque Webb from Ortho called  She stated patient was admitted in the hospital for Afib  She called regarding a updated cardiac clearance  Pt date of surgery is 10/18  Pt's last visit 9/10 with you  She was moderate risk for the operating room  Would you like to schedule a appointment for her for a updated Cardiac Clearance? Would you be willing over Double book? Or Would you clear without visit? Please Advise    Thanks

## 2018-10-10 NOTE — TELEPHONE ENCOUNTER
PAOLA FROM [facility] CARDIOLOGY  WOULD LIKE TO KNOW IF YOU NEED A NEW FORM FOR THE CARDIAC CLEARANCE    PLS CALL [phone] IF YOU NEED ANYTHING

## 2018-10-11 ENCOUNTER — TELEPHONE (OUTPATIENT)
Dept: CARDIOLOGY CLINIC | Facility: CLINIC | Age: 73
End: 2018-10-11

## 2018-10-16 ENCOUNTER — TELEPHONE (OUTPATIENT)
Dept: CARDIOLOGY CLINIC | Facility: CLINIC | Age: 73
End: 2018-10-16

## 2018-10-16 NOTE — TELEPHONE ENCOUNTER
Patient needs a surgery clearance note per Gail Howard who received a call from Sharon Villar  Amendment requested  I placed a new communication letter and sent to dr smith

## 2018-10-16 NOTE — LETTER
Cardiology Pre Operative Clearance      PRE OPERATIVE CARDIAC RISK ASSESSMENT    10/16/18    Christiano Marroquin  1945  [id number]    Date of Surgery: 11/12/2018    Type of Surgery: ARTHROPLASTY KNEE TOTAL (Right Knee)    Surgeon: Gustavo Kaur MD    No Cardiac Contraindication for Planned Surgical Procedures    Anticoagulation: Yes, Eliquis 5 mg twice daily , sotalol 120 mg twice daily     Physician Comment: From a cardiac standpoint she is moderate risk for the operating room  No further preoperative testing is required  Resume anticoagulation postoperatively       Electronically Signed: Diamante Burdick

## 2018-10-19 DIAGNOSIS — I48.91 ATRIAL FIBRILLATION, RAPID (HCC): ICD-10-CM

## 2018-10-19 DIAGNOSIS — I48.0 PAROXYSMAL ATRIAL FIBRILLATION (HCC): ICD-10-CM

## 2018-10-23 ENCOUNTER — OFFICE VISIT (OUTPATIENT)
Dept: CARDIOLOGY CLINIC | Facility: CLINIC | Age: 73
End: 2018-10-23
Payer: MEDICARE

## 2018-10-23 VITALS
OXYGEN SATURATION: 94 % | WEIGHT: 159.3 LBS | HEIGHT: 63 IN | SYSTOLIC BLOOD PRESSURE: 124 MMHG | BODY MASS INDEX: 28.23 KG/M2 | DIASTOLIC BLOOD PRESSURE: 72 MMHG

## 2018-10-23 DIAGNOSIS — I10 ESSENTIAL HYPERTENSION: ICD-10-CM

## 2018-10-23 DIAGNOSIS — I48.0 PAF (PAROXYSMAL ATRIAL FIBRILLATION) (HCC): Primary | ICD-10-CM

## 2018-10-23 DIAGNOSIS — I48.0 PAROXYSMAL ATRIAL FIBRILLATION (HCC): ICD-10-CM

## 2018-10-23 PROCEDURE — 99215 OFFICE O/P EST HI 40 MIN: CPT | Performed by: INTERNAL MEDICINE

## 2018-10-23 PROCEDURE — 93000 ELECTROCARDIOGRAM COMPLETE: CPT | Performed by: INTERNAL MEDICINE

## 2018-10-23 NOTE — LETTER
October 23, 2018     Wendy Madrigal MD  [address] [place] [id number]    Patient: Andres Steven   YOB: 1945   Date of Visit: 10/23/2018       Dear Dr Sindi Ramirez:    Thank you for referring Tess Fitzpatrick to me for evaluation  Below are my notes for this consultation  If you have questions, please do not hesitate to call me  I look forward to following your patient along with you  Sincerely,        Lisette Todd MD        CC: Shelley Pearson, DO Lisette Todd MD  10/23/2018  9:04 PM  Sign at close encounter                                             Cardiology Follow Up    Andres Steven  1945  [id number]  [address]  [address] [facility] [address] [address]    1  PAF (paroxysmal atrial fibrillation) (HCC)  POCT ECG   2  Essential hypertension     3  Paroxysmal atrial fibrillation (HCC)         Interval History:    The patient had a recent episode of RVR and was admitted  Sotalol was increased and she is back in sinus rhythm    The patient is a pleasant 48-year-old lady, with a history of A fib    This has been present for at least 6 years, but there has been a recent increase in frequency and duration  Was started on sotalol 80 bid about 3 years ago    Predominant symptom is palpitation and occasional shortness of breath and chest pain  There has been no episode of presyncope or syncope  She does not complain of orthopnea or paroxysmal nocturnal dyspnea  Patient does not have any leg swelling    she does have a history of snoring, morning fatigability and daytime sleepiness  However sleep study was negative for significant AKIN    The patient does have a history of knee replacement and has significant arthritis of her other knee    She has minimal episodes after being on higher dose of sotalol      Patient Active Problem List   Diagnosis    Essential hypertension    Dyslipidemia    Sacroiliitis (HCC)    Allergic rhinitis    Fibromyalgia    Hyperlipidemia    Insomnia    Lumbar spondylosis    Lumbar stenosis    Osteoarthrosis, hand    Osteoarthritis of knee    Osteopenia    Paroxysmal atrial fibrillation (HCC)    Peripheral neuropathy    Restless legs syndrome    Right lumbar radiculopathy    TMJ syndrome    Vitamin D deficiency     Past Medical History:   Diagnosis Date    Actinic keratosis     last assessed - [id number]    Arthritis     Atrial fibrillation with rapid ventricular response (HCC)     last assessed - [id number]    Basal cell carcinoma     Benign colon polyp     last assessed - [id number]    Effusion of knee joint right     Resolved - [id number]    Esophageal reflux     Fibromyalgia     last assessed - [id number]    Fibromyalgia, primary     Hypertension     Palpitations     last assessed - [id number]    Peroneal tendonitis, right     last assessed - [id number]    Rectal hemorrhage     last assessed - [id number]    Trochanteric bursitis of left hip 3/9/2018    Trochanteric bursitis, unspecified hip     last assessed - [id number]     Social History     Social History    Marital status: /Civil Union     Spouse name: N/A    Number of children: N/A    Years of education: N/A     Occupational History    Not on file       Social History Main Topics    Smoking status: Never Smoker    Smokeless tobacco: Never Used    Alcohol use 0 6 oz/week     1 Glasses of wine per week      Comment: occosional - every 3 months    Drug use: No    Sexual activity: Not on file     Other Topics Concern    Not on file     Social History Narrative    No narrative on file      Family History   Problem Relation Age of Onset    Heart disease Mother     Diabetes Mother     Heart disease Father     Coronary artery disease Father     Stroke Father         cerebrovascular accident    Heart attack Father         myocardial infarction    Sudden death Father         scd   Aleksandr Rae Other Family         Back disorder    Coronary artery disease Family     Neuropathy Family     Osteoporosis Family     Anuerysm Neg Hx     Clotting disorder Neg Hx     Arrhythmia Neg Hx     Heart failure Neg Hx      Past Surgical History:   Procedure Laterality Date    CATARACT EXTRACTION      COLONOSCOPY  03/2018    EYE SURGERY      HYSTERECTOMY      JOINT REPLACEMENT      RECTAL POLYPECTOMY      REPLACEMENT TOTAL KNEE Left     last assessed - 27Apr2015    TONSILLECTOMY      TOTAL ABDOMINAL HYSTERECTOMY      TUBAL LIGATION         Current Outpatient Prescriptions:     amLODIPine (NORVASC) 2 5 mg tablet, Take 1 tablet (2 5 mg total) by mouth daily, Disp: 90 tablet, Rfl: 3    apixaban (ELIQUIS) 5 mg, Take 1 tablet (5 mg total) by mouth 2 (two) times a day, Disp: 70 tablet, Rfl: 0    ascorbic acid (VITAMIN C) 500 mg tablet, Take 500 mg by mouth 2 (two) times a day, Disp: , Rfl:     B Complex-C-Folic Acid (B COMPLEX + C TR) TBCR, Take by mouth daily  , Disp: , Rfl:     Black Cohosh 20 MG TABS, Take by mouth daily  , Disp: , Rfl:     Cholecalciferol ([employer] VITAMIN D) 2000 UNITS CAPS, Take 2,000 Units by mouth 2 (two) times a day  , Disp: , Rfl:     Chromium Picolinate (CHROMIUM PICOLATE PO), Take 1 tablet by mouth daily, Disp: , Rfl:     ezetimibe (ZETIA) 10 mg tablet, TAKE 1 TABLET BY MOUTH  DAILY, Disp: 90 tablet, Rfl: 3    famotidine (PEPCID) 20 mg tablet, Take 20 mg by mouth daily  , Disp: , Rfl:     ferrous gluconate (FERGON) 324 mg tablet, Take 324 mg by mouth 2 (two) times a day, Disp: , Rfl:     folic acid (FOLVITE) 689 MCG tablet, Take 400 mcg by mouth daily, Disp: , Rfl:     gabapentin (NEURONTIN) 300 mg capsule, 2 capsules at bedtime   (Patient taking differently: Take 300 mg by mouth daily at bedtime 2 capsules at bedtime  ), Disp: 180 capsule, Rfl: 1    losartan (COZAAR) 100 MG tablet, Losartan Potassium 100 MG Oral Tablet take 1 tablet by mouth once daily  Quantity: 90;  Refills: 3    Uyen PATRICIA MD;  Ashutosh Pena 16-Oct-2011 Active, Disp: , Rfl:     rOPINIRole (REQUIP) 0 25 mg tablet, TAKE ONE TABLET BY MOUTH AT BEDTIME , Disp: 30 tablet, Rfl: 5    sotalol (BETAPACE) 120 mg tablet, Take 1 tablet (120 mg total) by mouth every 12 (twelve) hours, Disp: 180 tablet, Rfl: 3    traMADol (ULTRAM) 50 mg tablet, TAKE 1 TABLET BY MOUTH TWO  TIMES DAILY AS NEEDED, Disp: 180 tablet, Rfl: 1    zolpidem (AMBIEN) 10 mg tablet, Take 1 tablet (10 mg total) by mouth daily at bedtime as needed for sleep, Disp: 23 tablet, Rfl: 3  Allergies   Allergen Reactions    Penicillins Other (See Comments)     As a child calcium deposit in the arm     Ace Inhibitors GI Intolerance     Did feel well on it       Labs:  Lab Results   Component Value Date     09/16/2018     05/06/2015    K 4 0 09/16/2018    K 4 8 05/06/2015     09/16/2018    CL 99 (L) 05/06/2015    CO2 27 09/16/2018    CO2 28 05/06/2015    BUN 18 09/16/2018    BUN 19 05/06/2015    CREATININE 0 93 09/16/2018    CREATININE 0 97 05/06/2015    GLUCOSE 114 05/06/2015    CALCIUM 9 6 09/16/2018    CALCIUM 9 0 05/06/2015     Lab Results   Component Value Date    CKTOTAL 100 07/10/2014    TROPONINI <0 02 09/15/2018     Lab Results   Component Value Date    WBC 6 87 09/16/2018    WBC 6 63 05/06/2015    HGB 14 3 09/16/2018    HGB 13 3 05/06/2015    HCT 43 5 09/16/2018    HCT 39 3 05/06/2015    MCV 94 09/16/2018    MCV 93 05/06/2015     09/16/2018     05/06/2015     Lab Results   Component Value Date    CHOL 205 05/06/2015    TRIG 109 09/16/2018    TRIG 90 05/06/2015    HDL 59 09/16/2018    HDL 53 05/06/2015     Imaging: No results found  Review of Systems:  Review of Systems   All other systems reviewed and are negative  as described in my history of present illness        Physical Exam:  Physical Exam   Constitutional: She is oriented to person, place, and time  She appears well-developed and well-nourished  No distress  Not in any distress at the current time   HENT:   Head: Normocephalic and atraumatic  Right Ear: External ear normal    Left Ear: External ear normal    Nose: Nose normal    Mouth/Throat: Uvula is midline and mucous membranes are normal    Posterior pharynx is crowded   Eyes: Pupils are equal, round, and reactive to light  Conjunctivae, EOM and lids are normal  No scleral icterus  No pallor  No cyanosis  No icterus   Neck: Trachea normal and normal range of motion  No JVD present  Carotid bruit is not present  No thyromegaly present  No jugular lymphadenopathy   Cardiovascular: Normal rate, regular rhythm, S1 normal, S2 normal, normal heart sounds, intact distal pulses and normal pulses  PMI is not displaced  Exam reveals no gallop, no S3, no S4 and no friction rub  No murmur heard  Pulmonary/Chest: Effort normal and breath sounds normal  No accessory muscle usage  No respiratory distress  She has no decreased breath sounds  She has no wheezes  She has no rhonchi  She has no rales  She exhibits no tenderness  Abdominal: Soft  Normal appearance and bowel sounds are normal  She exhibits no distension and no mass  There is no splenomegaly or hepatomegaly  There is no tenderness  Musculoskeletal: Normal range of motion  She exhibits no edema, tenderness or deformity  Lymphadenopathy:     She has no cervical adenopathy  Neurological: She is alert and oriented to person, place, and time  Facial symmetry is retained  Extraocular movements are retained  Head neck tongue and palate movement are retained and symmetric   Skin: Skin is intact  No abrasion, no lesion and no rash noted  No erythema  Nails show no clubbing  Psychiatric: She has a normal mood and affect  Her speech is normal and behavior is normal  Thought content normal        Discussion/Summary:      1   Paroxysmal atrial fibrillation  Patient was originally seen in June 2016  Was started on sotalol at that time   Did well since then with only 2 major episodes  Was admitted recently with RVR and sotalol increased  Remains in sinus rhythm at this time     We had a very detailed discussion as far as management of atrial fibrillation   my detailed recommendation for this patient are as follows         1 - natural history of disease   atrial fibrillation is a disease of age   prevalence is 1% in the 4th decade , 2-3% by age 72 and 12-13% by age 80   since it increases with age , definitive therapy is indicated         2 - sleep apnea   untreated sleep apnea is the common cause of failure of medication as well as ablation   success rate of paroxysmal atrial fibrillation ablation falls from 80% in the 1st year, to 15% in the 1st year, for untreated severe sleep apnea   the patient has a history of snoring, morning fatigue at times and daytime sleepiness at times  physical examination for short thick neck and crowded posterior pharynx is -positive  patient is recommended to follow up with Pulmonary and Sleep Medicine - for AKIN   No significant sleep apnea in 2016       3 - thyroid function   hyperthyroidism is a common precipitator of atrial fibrillation   TSH - 1 759 in Sept 2018        4 -Aggressive management of  hypertension - on amlodepin  diabetes mellitus   heart failure - LVEF =60%        5 - anticoagulation   patient's chads Vasc score is -3  hypertension   age   gender      Is on apixaban     6 - rate control medication   beta-blocker effect of sotalol           7 - rhythm control medication      class 1 C agent - flecainide and propafenone   patient will need a stress study to rule out any underlying ischemia before these can be started   flecainide will necessitate use of an additional beta-blocker -   propafenone has intrinsic beta-blocker activity          class 3 agent - Sotalol and dofetilide   both will need hospital admission to monitor first 5 doses over the initial 2 and half days     sotalol has intrinsic beta-blocker properties   dofetilide may need an additional beta-blocker along with it for rate control  Keep potassium between 4 and 4 5   keep magnesium between 2 and 2 5   follow QTC   Follow creatinine   Patient is on sotalol      amiodarone   around this is very effective antiarrhythmic but has significantly long term toxicity   Hence certain basic studies are needed at baseline and thereafter at yearly intervals or   -hypo or hyperthyroidism , Check TSH    -can precipitate significant interstitial lung disease and hence DLCO at baseline and thereafter yearly   -long-term use causes cumulative toxicity in the liver- check  CMP   -corneal deposits advice and hence is ophthalmology evaluation         8 - ablation   this is the most effective method to achieve and maintain sinus rhythm      paroxysmal atrial fibrillation - 70-80% chance in the 1st year  and falls to 50% by 5 years   persistent atrial fibrillation - 50-60% chance in the 1st year and falls to 30% or less by 5 years      we use a combination of cryo and radiofrequency ablation      there is a 2-5% chance of serious complication which include - bleeding around access site, heart attack , stroke , bleeding around the heart requiring drainage , perforation requiring open heart surgery , phrenic nerve paralysis causing diaphragmatic paralysis, atrial esophageal fistula   we do deployed multiple methods to prevent such complication :  - heparin anticoagulation with ACT between 350 and 400s to prevent stroke   - coronary angiography if we are going to ablate close to any major blood vessel   -access to the pericardial drain if pericardial effusion were to happen   - pressure sensing catheters to reduce excessive pressure and chances of perforation   - esophageal temperature monitoring to reduce chances of injury           Summary of recommendation:  - Continue apixaban  - Aggressively manage hypertension  - if sotalol at current dose fails proceed with ablation          2   Hypertension  Currently controlled

## 2018-10-23 NOTE — PROGRESS NOTES
Cardiology Follow Up    Caitlin Sagastume  1945  [id number]  [address] [facility]  [address] [place] [address] [address]    1  PAF (paroxysmal atrial fibrillation) (HCC)  POCT ECG   2  Essential hypertension     3  Paroxysmal atrial fibrillation (HCC)         Interval History:    The patient had a recent episode of RVR and was admitted  Sotalol was increased and she is back in sinus rhythm    The patient is a pleasant 13-year-old lady, with a history of A fib    This has been present for at least 6 years, but there has been a recent increase in frequency and duration  Was started on sotalol 80 bid about 3 years ago    Predominant symptom is palpitation and occasional shortness of breath and chest pain  There has been no episode of presyncope or syncope  She does not complain of orthopnea or paroxysmal nocturnal dyspnea  Patient does not have any leg swelling    she does have a history of snoring, morning fatigability and daytime sleepiness  However sleep study was negative for significant AKIN    The patient does have a history of knee replacement and has significant arthritis of her other knee    She has minimal episodes after being on higher dose of sotalol      Patient Active Problem List   Diagnosis    Essential hypertension    Dyslipidemia    Sacroiliitis ([address] )    Allergic rhinitis    Fibromyalgia    Hyperlipidemia    Insomnia    Lumbar spondylosis    Lumbar stenosis    Osteoarthrosis, hand    Osteoarthritis of knee    Osteopenia    Paroxysmal atrial fibrillation (HCC)    Peripheral neuropathy    Restless legs syndrome    Right lumbar radiculopathy    TMJ syndrome    Vitamin D deficiency     Past Medical History:   Diagnosis Date    Actinic keratosis     last assessed - [id number]    Arthritis     Atrial fibrillation with rapid ventricular response ([address] )     last assessed - 26Apr2016   Rebecca Bellamy Basal cell carcinoma     Benign colon polyp     last assessed - [id number]    Effusion of knee joint right     Resolved - [id number]    Esophageal reflux     Fibromyalgia     last assessed - [id number]    Fibromyalgia, primary     Hypertension     Palpitations     last assessed - [id number]    Peroneal tendonitis, right     last assessed - [id number]    Rectal hemorrhage     last assessed - [id number]    Trochanteric bursitis of left hip 3/9/2018    Trochanteric bursitis, unspecified hip     last assessed - [id number]     Social History     Social History    Marital status: /Civil Union     Spouse name: N/A    Number of children: N/A    Years of education: N/A     Occupational History    Not on file       Social History Main Topics    Smoking status: Never Smoker    Smokeless tobacco: Never Used    Alcohol use 0 6 oz/week     1 Glasses of wine per week      Comment: occosional - every 3 months    Drug use: No    Sexual activity: Not on file     Other Topics Concern    Not on file     Social History Narrative    No narrative on file      Family History   Problem Relation Age of Onset    Heart disease Mother     Diabetes Mother     Heart disease Father     Coronary artery disease Father     Stroke Father         cerebrovascular accident    Heart attack Father         myocardial infarction   [place] Sudden death Father         scd    Other Family         Back disorder    Coronary artery disease Family     Neuropathy Family     Osteoporosis Family     Anuerysm Neg Hx     Clotting disorder Neg Hx     Arrhythmia Neg Hx     Heart failure Neg Hx      Past Surgical History:   Procedure Laterality Date    CATARACT EXTRACTION      COLONOSCOPY  03/2018    EYE SURGERY      HYSTERECTOMY      JOINT REPLACEMENT      RECTAL POLYPECTOMY      REPLACEMENT TOTAL KNEE Left     last assessed - [id number]    TONSILLECTOMY      TOTAL ABDOMINAL HYSTERECTOMY      TUBAL LIGATION         Current Outpatient Prescriptions:     amLODIPine (NORVASC) 2 5 mg tablet, Take 1 tablet (2 5 mg total) by mouth daily, Disp: 90 tablet, Rfl: 3    apixaban (ELIQUIS) 5 mg, Take 1 tablet (5 mg total) by mouth 2 (two) times a day, Disp: 70 tablet, Rfl: 0    ascorbic acid (VITAMIN C) 500 mg tablet, Take 500 mg by mouth 2 (two) times a day, Disp: , Rfl:     B Complex-C-Folic Acid (B COMPLEX + C TR) TBCR, Take by mouth daily  , Disp: , Rfl:     Black Cohosh 20 MG TABS, Take by mouth daily  , Disp: , Rfl:     Cholecalciferol (CVS VITAMIN D) 2000 UNITS CAPS, Take 2,000 Units by mouth 2 (two) times a day  , Disp: , Rfl:     Chromium Picolinate (CHROMIUM PICOLATE PO), Take 1 tablet by mouth daily, Disp: , Rfl:     ezetimibe (ZETIA) 10 mg tablet, TAKE 1 TABLET BY MOUTH  DAILY, Disp: 90 tablet, Rfl: 3    famotidine (PEPCID) 20 mg tablet, Take 20 mg by mouth daily  , Disp: , Rfl:     ferrous gluconate (FERGON) 324 mg tablet, Take 324 mg by mouth 2 (two) times a day, Disp: , Rfl:     folic acid (FOLVITE) 371 MCG tablet, Take 400 mcg by mouth daily, Disp: , Rfl:     gabapentin (NEURONTIN) 300 mg capsule, 2 capsules at bedtime   (Patient taking differently: Take 300 mg by mouth daily at bedtime 2 capsules at bedtime  ), Disp: 180 capsule, Rfl: 1    losartan (COZAAR) 100 MG tablet, Losartan Potassium 100 MG Oral Tablet take 1 tablet by mouth once daily  Quantity: 90;  Refills: 3    Toño PATRICIA MD;  Started 16-Oct-2011 Active, Disp: , Rfl:     rOPINIRole (REQUIP) 0 25 mg tablet, TAKE ONE TABLET BY MOUTH AT BEDTIME , Disp: 30 tablet, Rfl: 5    sotalol (BETAPACE) 120 mg tablet, Take 1 tablet (120 mg total) by mouth every 12 (twelve) hours, Disp: 180 tablet, Rfl: 3    traMADol (ULTRAM) 50 mg tablet, TAKE 1 TABLET BY MOUTH TWO  TIMES DAILY AS NEEDED, Disp: 180 tablet, Rfl: 1    zolpidem (AMBIEN) 10 mg tablet, Take 1 tablet (10 mg total) by mouth daily at bedtime as needed for sleep, Disp: 23 tablet, Rfl: 3  Allergies   Allergen Reactions    Penicillins Other (See Comments)     As a child calcium deposit in the arm     Ace Inhibitors GI Intolerance     Did feel well on it       Labs:  Lab Results   Component Value Date     09/16/2018     05/06/2015    K 4 0 09/16/2018    K 4 8 05/06/2015     09/16/2018    CL 99 (L) 05/06/2015    CO2 27 09/16/2018    CO2 28 05/06/2015    BUN 18 09/16/2018    BUN 19 05/06/2015    CREATININE 0 93 09/16/2018    CREATININE 0 97 05/06/2015    GLUCOSE 114 05/06/2015    CALCIUM 9 6 09/16/2018    CALCIUM 9 0 05/06/2015     Lab Results   Component Value Date    CKTOTAL 100 07/10/2014    TROPONINI <0 02 09/15/2018     Lab Results   Component Value Date    WBC 6 87 09/16/2018    WBC 6 63 05/06/2015    HGB 14 3 09/16/2018    HGB 13 3 05/06/2015    HCT 43 5 09/16/2018    HCT 39 3 05/06/2015    MCV 94 09/16/2018    MCV 93 05/06/2015     09/16/2018     05/06/2015     Lab Results   Component Value Date    CHOL 205 05/06/2015    TRIG 109 09/16/2018    TRIG 90 05/06/2015    HDL 59 09/16/2018    HDL 53 05/06/2015     Imaging: No results found  Review of Systems:  Review of Systems   All other systems reviewed and are negative  as described in my history of present illness        Physical Exam:  Physical Exam   Constitutional: She is oriented to person, place, and time  She appears well-developed and well-nourished  No distress  Not in any distress at the current time   HENT:   Head: Normocephalic and atraumatic  Right Ear: External ear normal    Left Ear: External ear normal    Nose: Nose normal    Mouth/Throat: Uvula is midline and mucous membranes are normal    Posterior pharynx is crowded   Eyes: Pupils are equal, round, and reactive to light  Conjunctivae, EOM and lids are normal  No scleral icterus  No pallor  No cyanosis  No icterus   Neck: Trachea normal and normal range of motion  No JVD present  Carotid bruit is not present  No thyromegaly present     No jugular lymphadenopathy   Cardiovascular: Normal rate, regular rhythm, S1 normal, S2 normal, normal heart sounds, intact distal pulses and normal pulses  PMI is not displaced  Exam reveals no gallop, no S3, no S4 and no friction rub  No murmur heard  Pulmonary/Chest: Effort normal and breath sounds normal  No accessory muscle usage  No respiratory distress  She has no decreased breath sounds  She has no wheezes  She has no rhonchi  She has no rales  She exhibits no tenderness  Abdominal: Soft  Normal appearance and bowel sounds are normal  She exhibits no distension and no mass  There is no splenomegaly or hepatomegaly  There is no tenderness  Musculoskeletal: Normal range of motion  She exhibits no edema, tenderness or deformity  Lymphadenopathy:     She has no cervical adenopathy  Neurological: She is alert and oriented to person, place, and time  Facial symmetry is retained  Extraocular movements are retained  Head neck tongue and palate movement are retained and symmetric   Skin: Skin is intact  No abrasion, no lesion and no rash noted  No erythema  Nails show no clubbing  Psychiatric: She has a normal mood and affect  Her speech is normal and behavior is normal  Thought content normal        Discussion/Summary:      1   Paroxysmal atrial fibrillation  Patient was originally seen in June 2016  Was started on sotalol at that time   Did well since then with only 2 major episodes  Was admitted recently with RVR and sotalol increased  Remains in sinus rhythm at this time     We had a very detailed discussion as far as management of atrial fibrillation   my detailed recommendation for this patient are as follows         1 - natural history of disease   atrial fibrillation is a disease of age   prevalence is 1% in the 4th decade , 2-3% by age 72 and 12-13% by age 80   since it increases with age , definitive therapy is indicated         2 - sleep apnea   untreated sleep apnea is the common cause of failure of medication as well as ablation   success rate of paroxysmal atrial fibrillation ablation falls from 80% in the 1st year, to 15% in the 1st year, for untreated severe sleep apnea   the patient has a history of snoring, morning fatigue at times and daytime sleepiness at times  physical examination for short thick neck and crowded posterior pharynx is -positive  patient is recommended to follow up with Pulmonary and Sleep Medicine - for AKIN   No significant sleep apnea in 2016       3 - thyroid function   hyperthyroidism is a common precipitator of atrial fibrillation   TSH - 1 759 in Sept 2018        4 -Aggressive management of  hypertension - on amlodepin  diabetes mellitus   heart failure - LVEF =60%        5 - anticoagulation   patient's chads Vasc score is -3  hypertension   age   gender      Is on apixaban     6 - rate control medication   beta-blocker effect of sotalol           7 - rhythm control medication      class 1 C agent - flecainide and propafenone   patient will need a stress study to rule out any underlying ischemia before these can be started   flecainide will necessitate use of an additional beta-blocker -   propafenone has intrinsic beta-blocker activity          class 3 agent - Sotalol and dofetilide   both will need hospital admission to monitor first 5 doses over the initial 2 and half days     sotalol has intrinsic beta-blocker properties   dofetilide may need an additional beta-blocker along with it for rate control  Keep potassium between 4 and 4 5   keep magnesium between 2 and 2 5   follow QTC   Follow creatinine   Patient is on sotalol      amiodarone   around this is very effective antiarrhythmic but has significantly long term toxicity   Hence certain basic studies are needed at baseline and thereafter at yearly intervals or   -hypo or hyperthyroidism , Check TSH    -can precipitate significant interstitial lung disease and hence DLCO at baseline and thereafter yearly   -long-term use causes cumulative toxicity in the liver- check  CMP   -corneal deposits advice and hence is ophthalmology evaluation         8 - ablation   this is the most effective method to achieve and maintain sinus rhythm      paroxysmal atrial fibrillation - 70-80% chance in the 1st year  and falls to 50% by 5 years   persistent atrial fibrillation - 50-60% chance in the 1st year and falls to 30% or less by 5 years      we use a combination of cryo and radiofrequency ablation      there is a 2-5% chance of serious complication which include - bleeding around access site, heart attack , stroke , bleeding around the heart requiring drainage , perforation requiring open heart surgery , phrenic nerve paralysis causing diaphragmatic paralysis, atrial esophageal fistula   we do deployed multiple methods to prevent such complication :  - heparin anticoagulation with ACT between 350 and 400s to prevent stroke   - coronary angiography if we are going to ablate close to any major blood vessel   -access to the pericardial drain if pericardial effusion were to happen   - pressure sensing catheters to reduce excessive pressure and chances of perforation   - esophageal temperature monitoring to reduce chances of injury           Summary of recommendation:  - Continue apixaban  - Aggressively manage hypertension  - if sotalol at current dose fails proceed with ablation          2   Hypertension  Currently controlled

## 2018-10-30 ENCOUNTER — OFFICE VISIT (OUTPATIENT)
Dept: OBGYN CLINIC | Facility: HOSPITAL | Age: 73
End: 2018-10-30
Payer: MEDICARE

## 2018-10-30 VITALS
BODY MASS INDEX: 28.17 KG/M2 | HEIGHT: 63 IN | SYSTOLIC BLOOD PRESSURE: 130 MMHG | WEIGHT: 159 LBS | HEART RATE: 59 BPM | DIASTOLIC BLOOD PRESSURE: 70 MMHG

## 2018-10-30 DIAGNOSIS — M25.461 EFFUSION OF RIGHT KNEE: ICD-10-CM

## 2018-10-30 DIAGNOSIS — M17.11 PRIMARY OSTEOARTHRITIS OF RIGHT KNEE: Primary | ICD-10-CM

## 2018-10-30 DIAGNOSIS — M25.561 ACUTE PAIN OF RIGHT KNEE: ICD-10-CM

## 2018-10-30 PROCEDURE — 20610 DRAIN/INJ JOINT/BURSA W/O US: CPT | Performed by: ORTHOPAEDIC SURGERY

## 2018-10-30 PROCEDURE — 99213 OFFICE O/P EST LOW 20 MIN: CPT | Performed by: ORTHOPAEDIC SURGERY

## 2018-10-30 RX ORDER — BETAMETHASONE SODIUM PHOSPHATE AND BETAMETHASONE ACETATE 3; 3 MG/ML; MG/ML
6 INJECTION, SUSPENSION INTRA-ARTICULAR; INTRALESIONAL; INTRAMUSCULAR; SOFT TISSUE
Status: COMPLETED | OUTPATIENT
Start: 2018-10-30 | End: 2018-10-30

## 2018-10-30 RX ORDER — LIDOCAINE HYDROCHLORIDE 10 MG/ML
2 INJECTION, SOLUTION INFILTRATION; PERINEURAL
Status: COMPLETED | OUTPATIENT
Start: 2018-10-30 | End: 2018-10-30

## 2018-10-30 RX ORDER — BUPIVACAINE HYDROCHLORIDE 2.5 MG/ML
2 INJECTION, SOLUTION INFILTRATION; PERINEURAL
Status: COMPLETED | OUTPATIENT
Start: 2018-10-30 | End: 2018-10-30

## 2018-10-30 RX ADMIN — BUPIVACAINE HYDROCHLORIDE 2 ML: 2.5 INJECTION, SOLUTION INFILTRATION; PERINEURAL at 11:50

## 2018-10-30 RX ADMIN — BETAMETHASONE SODIUM PHOSPHATE AND BETAMETHASONE ACETATE 6 MG: 3; 3 INJECTION, SUSPENSION INTRA-ARTICULAR; INTRALESIONAL; INTRAMUSCULAR; SOFT TISSUE at 11:50

## 2018-10-30 RX ADMIN — LIDOCAINE HYDROCHLORIDE 2 ML: 10 INJECTION, SOLUTION INFILTRATION; PERINEURAL at 11:50

## 2018-10-30 NOTE — PROGRESS NOTES
Assessment:  1  Primary osteoarthritis of right knee       Patient Active Problem List   Diagnosis    Essential hypertension    Dyslipidemia    Sacroiliitis (HCC)    Allergic rhinitis    Fibromyalgia    Hyperlipidemia    Insomnia    Lumbar spondylosis    Lumbar stenosis    Osteoarthrosis, hand    Osteoarthritis of knee    Osteopenia    Paroxysmal atrial fibrillation (HCC)    Peripheral neuropathy    Restless legs syndrome    Right lumbar radiculopathy    TMJ syndrome    Vitamin D deficiency    Effusion of right knee           Plan      Right knee aspiration injection as outlined below  And wishes to in definitely hold off on scheduling her right total knee arthroplasty for now  She understands for cortisone injection can be repeated every 3 months as needed  Patient was seen, examined and plan formulated by Dr Duc Traore joint arthrocentesis  Date/Time: 10/30/2018 11:50 AM  Consent given by: patient  Supporting Documentation  Indications: pain   Procedure Details  Location: knee - R knee  Needle size: 18 G  Ultrasound guidance: no  Approach: superior  Medications administered: 2 mL bupivacaine 0 25 %; 2 mL lidocaine 1 %; 6 mg betamethasone acetate-betamethasone sodium phosphate 6 (3-3) mg/mL    Aspirate amount: 20 mL  Patient tolerance: patient tolerated the procedure well with no immediate complications               Subjective:     Patient ID:    Chief Complaint:Farnaz Orrgler 75 y o  female      HPI  69-year-old female who is here for recheck of her knee  She was scheduled for an upcoming right total knee arthroplasty which she has canceled for family reasons  She is having some right knee discomfort    She has responded well to cortisone injections in the past      The following portions of the patient's history were reviewed and updated as appropriate: allergies, current medications, past family history, past social history, past surgical history and problem list     All organ systems normal    Social History     Social History    Marital status: /Civil Union     Spouse name: N/A    Number of children: N/A    Years of education: N/A     Occupational History    Not on file       Social History Main Topics    Smoking status: Never Smoker    Smokeless tobacco: Never Used    Alcohol use 0 6 oz/week     1 Glasses of wine per week      Comment: occosional - every 3 months    Drug use: No    Sexual activity: Not on file     Other Topics Concern    Not on file     Social History Narrative    No narrative on file     Past Medical History:   Diagnosis Date    Actinic keratosis     last assessed - [id number]    Arthritis     Atrial fibrillation with rapid ventricular response ([address] )     last assessed - [id number]    Basal cell carcinoma     Benign colon polyp     last assessed - [id number]    Effusion of knee joint right     Resolved - [id number]    Esophageal reflux     Fibromyalgia     last assessed - [id number]    Fibromyalgia, primary     Hypertension     Palpitations     last assessed - [id number]    Peroneal tendonitis, right     last assessed - [id number]    Rectal hemorrhage     last assessed - [id number]    Trochanteric bursitis of left hip 3/9/2018    Trochanteric bursitis, unspecified hip     last assessed - [id number]     Past Surgical History:   Procedure Laterality Date    CATARACT EXTRACTION      COLONOSCOPY  03/2018    EYE SURGERY      HYSTERECTOMY      JOINT REPLACEMENT      RECTAL POLYPECTOMY      REPLACEMENT TOTAL KNEE Left     last assessed - [id number]    TONSILLECTOMY      TOTAL ABDOMINAL HYSTERECTOMY      TUBAL LIGATION       Allergies   Allergen Reactions    Penicillins Other (See Comments)     As a child calcium deposit in the arm     Ace Inhibitors GI Intolerance     Did feel well on it     Current Outpatient Prescriptions on File Prior to Visit   Medication Sig Dispense Refill    amLODIPine (NORVASC) 2 5 mg tablet Take 1 tablet (2 5 mg total) by mouth daily 90 tablet 3    apixaban (ELIQUIS) 5 mg Take 1 tablet (5 mg total) by mouth 2 (two) times a day 70 tablet 0    ascorbic acid (VITAMIN C) 500 mg tablet Take 500 mg by mouth 2 (two) times a day      B Complex-C-Folic Acid (B COMPLEX + C TR) TBCR Take by mouth daily   Black Cohosh 20 MG TABS Take by mouth daily        Cholecalciferol (CVS VITAMIN D) 2000 UNITS CAPS Take 2,000 Units by mouth 2 (two) times a day        Chromium Picolinate (CHROMIUM PICOLATE PO) Take 1 tablet by mouth daily      ezetimibe (ZETIA) 10 mg tablet TAKE 1 TABLET BY MOUTH  DAILY 90 tablet 3    famotidine (PEPCID) 20 mg tablet Take 20 mg by mouth daily        ferrous gluconate (FERGON) 324 mg tablet Take 324 mg by mouth 2 (two) times a day      folic acid (FOLVITE) 509 MCG tablet Take 400 mcg by mouth daily      gabapentin (NEURONTIN) 300 mg capsule 2 capsules at bedtime  (Patient taking differently: Take 300 mg by mouth daily at bedtime 2 capsules at bedtime  ) 180 capsule 1    losartan (COZAAR) 100 MG tablet Losartan Potassium 100 MG Oral Tablet take 1 tablet by mouth once daily  Quantity: 90;  Refills: 3    Janay PATRICIA MD;  Judith Jama 16-Oct-2011 Active      rOPINIRole (REQUIP) 0 25 mg tablet TAKE ONE TABLET BY MOUTH AT BEDTIME  30 tablet 5    sotalol (BETAPACE) 120 mg tablet Take 1 tablet (120 mg total) by mouth every 12 (twelve) hours 180 tablet 3    traMADol (ULTRAM) 50 mg tablet TAKE 1 TABLET BY MOUTH TWO  TIMES DAILY AS NEEDED 180 tablet 1    zolpidem (AMBIEN) 10 mg tablet Take 1 tablet (10 mg total) by mouth daily at bedtime as needed for sleep 23 tablet 3     No current facility-administered medications on file prior to visit                   Objective:        Right Knee Exam     Other   Erythema: absent  Scars: absent  Sensation: normal  Pulse: present  Swelling: none  Other tests: effusion present                no imaging    Portions of the record may have been created with voice recognition software   Occasional wrong word or "sound a like" substitutions may have occurred due to the inherent limitations of voice recognition software   Read the chart carefully and recognize, using context, where substitutions have occurred

## 2018-11-01 ENCOUNTER — TELEPHONE (OUTPATIENT)
Dept: OBGYN CLINIC | Facility: MEDICAL CENTER | Age: 73
End: 2018-11-01

## 2018-11-01 NOTE — TELEPHONE ENCOUNTER
Spoke with patient and she will like to cancel sx at this time due to personal issues  She made an appt to see   [facility] for another injection  Sx will be cancelled at this   time

## 2018-11-08 DIAGNOSIS — I48.0 PAROXYSMAL ATRIAL FIBRILLATION (HCC): ICD-10-CM

## 2018-11-10 RX ORDER — APIXABAN 5 MG/1
TABLET, FILM COATED ORAL
Qty: 60 TABLET | Refills: 2 | Status: SHIPPED | OUTPATIENT
Start: 2018-11-10 | End: 2019-01-22

## 2018-11-28 DIAGNOSIS — G25.81 RLS (RESTLESS LEGS SYNDROME): ICD-10-CM

## 2018-11-28 DIAGNOSIS — G47.01 INSOMNIA DUE TO MEDICAL CONDITION: ICD-10-CM

## 2018-11-28 RX ORDER — ROPINIROLE 0.25 MG/1
0.25 TABLET, FILM COATED ORAL
Qty: 90 TABLET | Refills: 1 | Status: SHIPPED | OUTPATIENT
Start: 2018-11-28 | End: 2019-05-31 | Stop reason: SDUPTHER

## 2018-11-28 RX ORDER — ZOLPIDEM TARTRATE 10 MG/1
10 TABLET ORAL
Qty: 90 TABLET | Refills: 1 | Status: SHIPPED | OUTPATIENT
Start: 2018-11-28 | End: 2019-02-06 | Stop reason: SDUPTHER

## 2018-11-29 ENCOUNTER — OFFICE VISIT (OUTPATIENT)
Dept: CARDIOLOGY CLINIC | Facility: CLINIC | Age: 73
End: 2018-11-29
Payer: MEDICARE

## 2018-11-29 VITALS
HEIGHT: 63 IN | SYSTOLIC BLOOD PRESSURE: 140 MMHG | DIASTOLIC BLOOD PRESSURE: 74 MMHG | BODY MASS INDEX: 28 KG/M2 | HEART RATE: 50 BPM | WEIGHT: 158 LBS

## 2018-11-29 DIAGNOSIS — I10 HYPERTENSION, UNSPECIFIED TYPE: ICD-10-CM

## 2018-11-29 DIAGNOSIS — I10 ESSENTIAL HYPERTENSION: ICD-10-CM

## 2018-11-29 DIAGNOSIS — I48.91 ATRIAL FIBRILLATION, UNSPECIFIED TYPE (HCC): Primary | ICD-10-CM

## 2018-11-29 DIAGNOSIS — E78.2 MIXED HYPERLIPIDEMIA: ICD-10-CM

## 2018-11-29 DIAGNOSIS — I48.0 PAROXYSMAL ATRIAL FIBRILLATION (HCC): ICD-10-CM

## 2018-11-29 PROCEDURE — 99214 OFFICE O/P EST MOD 30 MIN: CPT | Performed by: INTERNAL MEDICINE

## 2018-11-29 PROCEDURE — 93000 ELECTROCARDIOGRAM COMPLETE: CPT | Performed by: INTERNAL MEDICINE

## 2018-11-29 RX ORDER — AMLODIPINE BESYLATE 5 MG/1
5 TABLET ORAL DAILY
Qty: 30 TABLET | Refills: 11 | Status: SHIPPED | OUTPATIENT
Start: 2018-11-29 | End: 2019-07-11 | Stop reason: SDUPTHER

## 2018-11-29 NOTE — PROGRESS NOTES
Cardiology Follow Up    Erna Kemp  1945  [id number]  [facility] [address]  [facility] [address] [place] [address] [address] [id number]    1  Atrial fibrillation, unspecified type ([address] )  POCT ECG   2  Paroxysmal atrial fibrillation (HCC)     3  Essential hypertension     4  Mixed hyperlipidemia     5  Hypertension, unspecified type  amLODIPine (NORVASC) 5 mg tablet       Discussion/Summary:  Following our last visit she had undergone knee replacement surgery  She developed rapid atrial fibrillation and spontaneously converted  Her sotalol dose was increased to 120 b i d  And she has been doing well on this  She is tolerating anticoagulation with no adverse bleeding events  Blood pressure was little high in the office I increased amlodipine 5 mg daily  She is not due for any further cardiac testing will try to get more strict with her blood pressure control  Interval History:  69-year-old female with a history of hypertension, hyperlipidemia, paroxysmal atrial fibrillation presents for routine follow-up visit and preoperative risk assessment  Functional capacity has been good  Greater than 4 Mets  Denies any chest pain, shortness of breath, palpitations, lightheadedness, dizziness, or syncope  She has been taking all medications as prescribed  Overall she has been feeling well  Denies any symptoms  She had 1 episode of recurrent AFib following surgery otherwise sotalol as been working well  She is now on 01/20 b i d  QTC is been acceptable  If she has any further breakthrough will need eventual ablation  Overall she is doing well  Denies any chest pain, shortness of breath, palpitations, lightheadedness, dizziness, or syncope          Problem List     Atrial fibrillation with rapid ventricular response (HCC)    Essential hypertension    Dyslipidemia    Palpitations    Sacroiliitis (HCC)    Chronic pain of right knee    Primary osteoarthritis of right knee    Left hip pain    Trochanteric bursitis of left hip    Thumb pain, left        Past Medical History:   Diagnosis Date    Actinic keratosis     last assessed - [id number]    Arthritis     Atrial fibrillation with rapid ventricular response (HCC)     last assessed - [id number]    Basal cell carcinoma     Benign colon polyp     last assessed - [id number]    Effusion of knee joint right     Resolved - [id number]    Esophageal reflux     Fibromyalgia     last assessed - [id number]    Fibromyalgia, primary     Hypertension     Palpitations     last assessed - [id number]    Peroneal tendonitis, right     last assessed - [id number]    Rectal hemorrhage     last assessed - [id number]    Trochanteric bursitis of left hip 3/9/2018    Trochanteric bursitis, unspecified hip     last assessed - [id number]     Social History     Social History    Marital status: /Civil Union     Spouse name: N/A    Number of children: N/A    Years of education: N/A     Occupational History    Not on file       Social History Main Topics    Smoking status: Never Smoker    Smokeless tobacco: Never Used    Alcohol use 0 6 oz/week     1 Glasses of wine per week      Comment: occosional - every 3 months    Drug use: No    Sexual activity: Not on file     Other Topics Concern    Not on file     Social History Narrative    No narrative on file      Family History   Problem Relation Age of Onset    Heart disease Mother     Diabetes Mother     Heart disease Father     Coronary artery disease Father     Stroke Father         cerebrovascular accident   Delcie Jadon Heart attack Father         myocardial infarction   Delcie Jadon Sudden death Father         scd    Other Family         Back disorder    Coronary artery disease Family     Neuropathy Family     Osteoporosis Family     Anuerysm Neg Hx     Clotting disorder Neg Hx     Arrhythmia Neg Hx     Heart failure Neg Hx      Past Surgical History: Procedure Laterality Date    CATARACT EXTRACTION      COLONOSCOPY  03/2018    EYE SURGERY      HYSTERECTOMY      JOINT REPLACEMENT      RECTAL POLYPECTOMY      REPLACEMENT TOTAL KNEE Left     last assessed - 27Apr2015    TONSILLECTOMY      TOTAL ABDOMINAL HYSTERECTOMY      TUBAL LIGATION         Current Outpatient Prescriptions:     amLODIPine (NORVASC) 5 mg tablet, Take 1 tablet (5 mg total) by mouth daily, Disp: 30 tablet, Rfl: 11    ascorbic acid (VITAMIN C) 500 mg tablet, Take 500 mg by mouth 2 (two) times a day, Disp: , Rfl:     B Complex-C-Folic Acid (B COMPLEX + C TR) TBCR, Take by mouth daily  , Disp: , Rfl:     Black Cohosh 20 MG TABS, Take by mouth daily  , Disp: , Rfl:     Cholecalciferol ([employer]S VITAMIN D) 2000 UNITS CAPS, Take 2,000 Units by mouth 2 (two) times a day  , Disp: , Rfl:     Chromium Picolinate (CHROMIUM PICOLATE PO), Take 1 tablet by mouth daily, Disp: , Rfl:     ELIQUIS 5 MG, TAKE ONE TABLET BY MOUTH TWICE DAILY , Disp: 60 tablet, Rfl: 2    ezetimibe (ZETIA) 10 mg tablet, TAKE 1 TABLET BY MOUTH  DAILY, Disp: 90 tablet, Rfl: 3    famotidine (PEPCID) 20 mg tablet, Take 20 mg by mouth daily  , Disp: , Rfl:     ferrous gluconate (FERGON) 324 mg tablet, Take 324 mg by mouth 2 (two) times a day, Disp: , Rfl:     gabapentin (NEURONTIN) 300 mg capsule, 2 capsules at bedtime   (Patient taking differently: Take 300 mg by mouth daily at bedtime 2 capsules at bedtime  ), Disp: 180 capsule, Rfl: 1    losartan (COZAAR) 100 MG tablet, Losartan Potassium 100 MG Oral Tablet take 1 tablet by mouth once daily  Quantity: 90;  Refills: 3    Marisela PATRICIA MD;  Started 16-Oct-2011 Active, Disp: , Rfl:     rOPINIRole (REQUIP) 0 25 mg tablet, Take 1 tablet (0 25 mg total) by mouth daily at bedtime, Disp: 90 tablet, Rfl: 1    sotalol (BETAPACE) 120 mg tablet, Take 1 tablet (120 mg total) by mouth every 12 (twelve) hours, Disp: 180 tablet, Rfl: 3    traMADol (ULTRAM) 50 mg tablet, TAKE 1 TABLET BY MOUTH TWO  TIMES DAILY AS NEEDED, Disp: 180 tablet, Rfl: 1    zolpidem (AMBIEN) 10 mg tablet, Take 1 tablet (10 mg total) by mouth daily at bedtime as needed for sleep, Disp: 90 tablet, Rfl: 1    folic acid (FOLVITE) 038 MCG tablet, Take 400 mcg by mouth daily, Disp: , Rfl:   Allergies   Allergen Reactions    Penicillins Other (See Comments)     As a child calcium deposit in the arm     Ace Inhibitors GI Intolerance     Did feel well on it       Labs:     Chemistry        Component Value Date/Time     05/06/2015 1116    K 4 0 09/16/2018 0501    K 4 8 05/06/2015 1116     09/16/2018 0501    CL 99 (L) 05/06/2015 1116    CO2 27 09/16/2018 0501    CO2 28 05/06/2015 1116    BUN 18 09/16/2018 0501    BUN 19 05/06/2015 1116    CREATININE 0 93 09/16/2018 0501    CREATININE 0 97 05/06/2015 1116        Component Value Date/Time    CALCIUM 9 6 09/16/2018 0501    CALCIUM 9 0 05/06/2015 1116    ALKPHOS 58 09/11/2018 1011    ALKPHOS 60 05/06/2015 1116    AST 21 09/11/2018 1011    AST 28 05/06/2015 1116    ALT 29 09/11/2018 1011    ALT 34 05/06/2015 1116    BILITOT 0 78 05/06/2015 1116            Lab Results   Component Value Date    CHOL 205 05/06/2015    CHOL 195 07/10/2014     Lab Results   Component Value Date    HDL 59 09/16/2018    HDL 58 04/25/2018    HDL 50 04/21/2016     Lab Results   Component Value Date    LDLCALC 121 (H) 09/16/2018    LDLCALC 90 04/25/2018    LDLCALC 104 (H) 04/21/2016     Lab Results   Component Value Date    TRIG 109 09/16/2018    TRIG 76 04/25/2018    TRIG 89 04/21/2016     No results found for: CHOLHDL    Imaging: No results found  ECG:  Sinus bradycardia       Review of Systems   Constitution: Negative  HENT: Negative  Eyes: Negative  Cardiovascular: Negative  Respiratory: Negative  Endocrine: Negative  Hematologic/Lymphatic: Negative  Skin: Negative  Musculoskeletal: Negative  Gastrointestinal: Negative  Genitourinary: Negative  Neurological: Negative  Psychiatric/Behavioral: Negative  Vitals:    11/29/18 0758   BP: 140/74   Pulse: (!) 50     Vitals:    11/29/18 0758   Weight: 71 7 kg (158 lb)     Height: 5' 3" (160 cm)   Body mass index is 27 99 kg/m²  Physical Exam:  Vital signs reviewed    General appearance:  Appears stated age, alert, well appearing and in no distress  HEENT:  PERRLA, EOMI, no scleral icterus, no conjunctival pallor  NECK:  Supple, No elevated JVP, no thyromegaly, no carotid bruits  HEART:  Regular rate and rhythm, normal S1/S2, no S3/S4, no murmur or rub  LUNGS:  Clear to auscultation bilaterally, no wheezes rales or rhonchi  ABDOMEN:  Soft, non-tender, positive bowel sounds, no rebound or guarding, no organomegaly   EXTREMITIES:  No edema, normal range of motion  VASCULAR:  Normal pedal pulses, good pulse volume   SKIN: No lesions or rashes on exposed skin  NEURO:  CN II-XII intact, no focal deficits

## 2018-12-03 ENCOUNTER — TELEPHONE (OUTPATIENT)
Dept: CARDIOLOGY CLINIC | Facility: CLINIC | Age: 73
End: 2018-12-03

## 2018-12-03 DIAGNOSIS — G47.01 INSOMNIA DUE TO MEDICAL CONDITION: ICD-10-CM

## 2018-12-04 ENCOUNTER — IMMUNIZATION (OUTPATIENT)
Dept: FAMILY MEDICINE CLINIC | Facility: CLINIC | Age: 73
End: 2018-12-04
Payer: MEDICARE

## 2018-12-04 DIAGNOSIS — Z23 NEED FOR IMMUNIZATION AGAINST INFLUENZA: Primary | ICD-10-CM

## 2018-12-04 PROCEDURE — G0008 ADMIN INFLUENZA VIRUS VAC: HCPCS

## 2018-12-04 PROCEDURE — 90662 IIV NO PRSV INCREASED AG IM: CPT

## 2019-01-03 ENCOUNTER — OFFICE VISIT (OUTPATIENT)
Dept: OBGYN CLINIC | Facility: HOSPITAL | Age: 74
End: 2019-01-03
Payer: MEDICARE

## 2019-01-03 VITALS
WEIGHT: 161 LBS | HEART RATE: 60 BPM | SYSTOLIC BLOOD PRESSURE: 147 MMHG | BODY MASS INDEX: 28.53 KG/M2 | DIASTOLIC BLOOD PRESSURE: 75 MMHG | HEIGHT: 63 IN

## 2019-01-03 DIAGNOSIS — M25.552 LATERAL PAIN OF LEFT HIP: ICD-10-CM

## 2019-01-03 DIAGNOSIS — M70.62 TROCHANTERIC BURSITIS OF LEFT HIP: Primary | ICD-10-CM

## 2019-01-03 PROCEDURE — 20610 DRAIN/INJ JOINT/BURSA W/O US: CPT | Performed by: ORTHOPAEDIC SURGERY

## 2019-01-03 PROCEDURE — 99213 OFFICE O/P EST LOW 20 MIN: CPT | Performed by: ORTHOPAEDIC SURGERY

## 2019-01-03 RX ORDER — BETAMETHASONE SODIUM PHOSPHATE AND BETAMETHASONE ACETATE 3; 3 MG/ML; MG/ML
12 INJECTION, SUSPENSION INTRA-ARTICULAR; INTRALESIONAL; INTRAMUSCULAR; SOFT TISSUE
Status: COMPLETED | OUTPATIENT
Start: 2019-01-03 | End: 2019-01-03

## 2019-01-03 RX ORDER — BUPIVACAINE HYDROCHLORIDE 2.5 MG/ML
2 INJECTION, SOLUTION INFILTRATION; PERINEURAL
Status: COMPLETED | OUTPATIENT
Start: 2019-01-03 | End: 2019-01-03

## 2019-01-03 RX ORDER — LIDOCAINE HYDROCHLORIDE 20 MG/ML
2 INJECTION, SOLUTION EPIDURAL; INFILTRATION; INTRACAUDAL; PERINEURAL
Status: COMPLETED | OUTPATIENT
Start: 2019-01-03 | End: 2019-01-03

## 2019-01-03 RX ADMIN — BETAMETHASONE SODIUM PHOSPHATE AND BETAMETHASONE ACETATE 12 MG: 3; 3 INJECTION, SUSPENSION INTRA-ARTICULAR; INTRALESIONAL; INTRAMUSCULAR; SOFT TISSUE at 15:16

## 2019-01-03 RX ADMIN — LIDOCAINE HYDROCHLORIDE 2 ML: 20 INJECTION, SOLUTION EPIDURAL; INFILTRATION; INTRACAUDAL; PERINEURAL at 15:16

## 2019-01-03 RX ADMIN — BUPIVACAINE HYDROCHLORIDE 2 ML: 2.5 INJECTION, SOLUTION INFILTRATION; PERINEURAL at 15:16

## 2019-01-03 NOTE — PROGRESS NOTES
Assessment:  1  Trochanteric bursitis of left hip  Large joint arthrocentesis   2  Lateral pain of left hip  Large joint arthrocentesis       Plan:  Diagnosis, treatment options and associated risks were discussed with the patient including no treatment, nonsurgical treatment and potential for surgical intervention  The patient was given the opportunity to ask questions regarding each  Surgical intervention for trochanteric bursitis is not indicated or warranted  Offered, accepted, performed a cortisone injection to her left trochanteric bursal area  Ice and post injection protocol advised  Iliotibial band stretching was demonstrated in the office today  Weightbearing activities as tolerated  To do next visit:  Return in about 3 months (around 4/3/2019) for re-check  The above stated was discussed in layman's terms and the patient expressed understanding  All questions were answered to the patient's satisfaction  Scribe Attestation    I,:   Olivier Santos am acting as a scribe while in the presence of the attending physician :        I,:   Mitra Lloyd MD personally performed the services described in this documentation    as scribed in my presence :              Subjective:   Ana rGant is a 75 y o  female who presents for re-evaluation due to persistent pain laterally at her left hip  She had a trochanteric bursal cortisone injection performed 6 months ago with relief up until recent  She denies any groin pain denies any low back pain  She has pain focal to the lateral aspect of her hip and will occasion radiate laterally at her thigh  Denies any numbness or tingling  She cannot lay on her left side due to pain  She wakes up in the morning if she is on her right side with stiffness at her left hip  She has difficulty ambulating abnormal height stairs  She has found some relief with use of a heating pad        Review of systems negative unless otherwise specified in HPI    Past Medical History:   Diagnosis Date    Actinic keratosis     last assessed - 06Jun2014    Arthritis     Atrial fibrillation with rapid ventricular response ([address] )     last assessed - 26Apr2016    Basal cell carcinoma     Benign colon polyp     last assessed - 27Apr2015    Effusion of knee joint right     Resolved - 19Apr2016    Esophageal reflux     Fibromyalgia     last assessed - [id number]    Fibromyalgia, primary     Hypertension     Palpitations     last assessed - 30Apr2013    Peroneal tendonitis, right     last assessed - [id number]    Rectal hemorrhage     last assessed - [id number]    Trochanteric bursitis of left hip 3/9/2018    Trochanteric bursitis, unspecified hip     last assessed - [id number]       Past Surgical History:   Procedure Laterality Date    CATARACT EXTRACTION      COLONOSCOPY  03/2018    EYE SURGERY      HYSTERECTOMY      JOINT REPLACEMENT      RECTAL POLYPECTOMY      REPLACEMENT TOTAL KNEE Left     last assessed - 27Apr2015    TONSILLECTOMY      TOTAL ABDOMINAL HYSTERECTOMY      TUBAL LIGATION         Family History   Problem Relation Age of Onset    Heart disease Mother     Diabetes Mother     Heart disease Father     Coronary artery disease Father     Stroke Father         cerebrovascular accident    Heart attack Father         myocardial infarction   Earna Ana Lilia Sudden death Father         scd    Other Family         Back disorder    Coronary artery disease Family     Neuropathy Family     Osteoporosis Family     Anuerysm Neg Hx     Clotting disorder Neg Hx     Arrhythmia Neg Hx     Heart failure Neg Hx        Social History     Occupational History    Not on file       Social History Main Topics    Smoking status: Never Smoker    Smokeless tobacco: Never Used    Alcohol use 0 6 oz/week     1 Glasses of wine per week      Comment: occosional - every 3 months    Drug use: No    Sexual activity: Not on file         Current Outpatient Prescriptions:    amLODIPine (NORVASC) 5 mg tablet, Take 1 tablet (5 mg total) by mouth daily, Disp: 30 tablet, Rfl: 11    ascorbic acid (VITAMIN C) 500 mg tablet, Take 500 mg by mouth 2 (two) times a day, Disp: , Rfl:     B Complex-C-Folic Acid (B COMPLEX + C TR) TBCR, Take by mouth daily  , Disp: , Rfl:     Black Cohosh 20 MG TABS, Take by mouth daily  , Disp: , Rfl:     Cholecalciferol ([employer]S VITAMIN D) 2000 UNITS CAPS, Take 2,000 Units by mouth 2 (two) times a day  , Disp: , Rfl:     Chromium Picolinate (CHROMIUM PICOLATE PO), Take 1 tablet by mouth daily, Disp: , Rfl:     ELIQUIS 5 MG, TAKE ONE TABLET BY MOUTH TWICE DAILY , Disp: 60 tablet, Rfl: 2    ezetimibe (ZETIA) 10 mg tablet, TAKE 1 TABLET BY MOUTH  DAILY, Disp: 90 tablet, Rfl: 3    famotidine (PEPCID) 20 mg tablet, Take 20 mg by mouth daily  , Disp: , Rfl:     ferrous gluconate (FERGON) 324 mg tablet, Take 324 mg by mouth 2 (two) times a day, Disp: , Rfl:     folic acid (FOLVITE) 360 MCG tablet, Take 400 mcg by mouth daily, Disp: , Rfl:     gabapentin (NEURONTIN) 300 mg capsule, 2 capsules at bedtime   (Patient taking differently: Take 300 mg by mouth daily at bedtime 2 capsules at bedtime  ), Disp: 180 capsule, Rfl: 1    losartan (COZAAR) 100 MG tablet, Losartan Potassium 100 MG Oral Tablet take 1 tablet by mouth once daily  Quantity: 90;  Refills: 3    Uyen PATRICIA MD;  Started 16-Oct-2011 Active, Disp: , Rfl:     rOPINIRole (REQUIP) 0 25 mg tablet, Take 1 tablet (0 25 mg total) by mouth daily at bedtime, Disp: 90 tablet, Rfl: 1    sotalol (BETAPACE) 120 mg tablet, Take 1 tablet (120 mg total) by mouth every 12 (twelve) hours, Disp: 180 tablet, Rfl: 3    traMADol (ULTRAM) 50 mg tablet, TAKE 1 TABLET BY MOUTH TWO  TIMES DAILY AS NEEDED, Disp: 180 tablet, Rfl: 1    zolpidem (AMBIEN) 10 mg tablet, Take 1 tablet (10 mg total) by mouth daily at bedtime as needed for sleep, Disp: 90 tablet, Rfl: 1    Allergies   Allergen Reactions    Penicillins Other (See Comments)     As a child calcium deposit in the arm     Ace Inhibitors GI Intolerance     Did feel well on it            Vitals:    01/03/19 1445   BP: 147/75   Pulse: 60       Objective:                    Left Hip Exam     Tenderness   The patient is experiencing tenderness in the greater trochanter (Trochanteric flare)  Range of Motion   The patient has normal left hip ROM  Muscle Strength   The patient has normal left hip strength (Increased pain laterally resistance to hip abduction)  Other   Erythema: absent  Sensation: normal            Diagnostics, reviewed and taken today if performed as documented:    None performed        Procedures, if performed today:    Large joint arthrocentesis  Date/Time: 1/3/2019 3:16 PM  Consent given by: patient  Site marked: site marked  Supporting Documentation  Indications: pain   Procedure Details  Location: hip - L greater trochanteric bursa  Preparation: Patient was prepped and draped in the usual sterile fashion  Needle size: 22 G  Ultrasound guidance: no  Approach: lateral  Medications administered: 12 mg betamethasone acetate-betamethasone sodium phosphate 6 (3-3) mg/mL; 2 mL bupivacaine 0 25 %; 2 mL lidocaine (PF) 2 %    Patient tolerance: patient tolerated the procedure well with no immediate complications  Dressing:  Sterile dressing applied            Portions of the record may have been created with voice recognition software   Occasional wrong word or "sound a like" substitutions may have occurred due to the inherent limitations of voice recognition software   Read the chart carefully and recognize, using context, where substitutions have occurred

## 2019-01-04 ENCOUNTER — OFFICE VISIT (OUTPATIENT)
Dept: PAIN MEDICINE | Facility: CLINIC | Age: 74
End: 2019-01-04
Payer: MEDICARE

## 2019-01-04 VITALS
WEIGHT: 161 LBS | RESPIRATION RATE: 18 BRPM | SYSTOLIC BLOOD PRESSURE: 136 MMHG | DIASTOLIC BLOOD PRESSURE: 84 MMHG | BODY MASS INDEX: 28.53 KG/M2 | HEIGHT: 63 IN

## 2019-01-04 DIAGNOSIS — M79.7 FIBROMYALGIA: ICD-10-CM

## 2019-01-04 DIAGNOSIS — M47.816 LUMBAR SPONDYLOSIS: ICD-10-CM

## 2019-01-04 DIAGNOSIS — M70.62 GREATER TROCHANTERIC BURSITIS OF LEFT HIP: Primary | ICD-10-CM

## 2019-01-04 PROCEDURE — 99213 OFFICE O/P EST LOW 20 MIN: CPT | Performed by: NURSE PRACTITIONER

## 2019-01-04 RX ORDER — GABAPENTIN 300 MG/1
CAPSULE ORAL
Qty: 180 CAPSULE | Refills: 1 | Status: SHIPPED | OUTPATIENT
Start: 2019-01-04 | End: 2019-06-21 | Stop reason: SDUPTHER

## 2019-01-04 NOTE — PROGRESS NOTES
Pt c/o back pain that radiates to the hips and legs and neck pain that radiates to the arms      Assessment:  1  Greater trochanteric bursitis of left hip    2  Lumbar spondylosis    3  Fibromyalgia        Plan:  Amara Nix is a 75 y o  female with a history of lumbar spondylosis, sacroiliitis and fibromyalgia  The patient continues with ongoing pain that is multifactorial nature  She reports ongoing pain left greater trochanteric bursa pain  She underwent a cortisone injection in her left greater trochanteric bursa yesterday with Dr Nilesh Mayes  I discussed with the patient that it can take up to two weeks to see relief of symptoms  She verbalized an understanding  She reports that her pain and symptoms are well managed with the use of gabapentin 300 mg 2 capsules at bedtime  Therefore, the patient be continued on gabapentin 300 mg 2 capsules at bedtime, a 90 day prescription was sent to the patient's pharmacy on file, with 1 refill  The patient will follow-up in 6 months, or sooner with the worsening of symptoms  My impressions and treatment recommendations were discussed in detail with the patient who verbalized understanding and had no further questions  Discharge instructions were provided  I personally saw and examined the patient and I agree with the above discussed plan of care  No orders of the defined types were placed in this encounter  New Medications Ordered This Visit   Medications    gabapentin (NEURONTIN) 300 mg capsule     Sig: Take 2 capsules at bedtime  Dispense:  180 capsule     Refill:  1       History of Present Illness:  Amara Nix is a 75 y o  female with a history of lumbar spondylosis, sacroiliitis and fibromyalgia  The patient was last seen office on 7/5/2018 where she was continued on gabapentin 300 mg 2 tablets at bedtime  She presents for a follow up office visit in regards to Back Pain (radiates to the hips and legs);  Neck Pain; Arm Pain; Leg Pain; and Hip Pain  The patients current symptoms include low back pain that radiates down the anterior and posterior aspect of her bilateral legs to her feet  She also complains of bilateral arm pain that radiates from her shoulders down the anterior and posterior aspect of her bilateral arms  She describes her pain as dull aching, pressure-like pain that is occasional nature with symptoms worsening during the morning hours  The patient reports that her pain is symptoms have improved since last office visit  She contributes improvement her symptoms due to gabapentin  She currently rates her pain as 7 out 10 numeric pain scale  Current pain medications includes:  Gabapentin 300 mg 2 tablets at bedtime  The patient reports that this regimen is providing 50% pain relief  The patient is reporting no side effects from this pain medication regimen  I have personally reviewed and/or updated the patient's past medical history, past surgical history, family history, social history, current medications, allergies, and vital signs today  Review of Systems   Respiratory: Negative for shortness of breath  Cardiovascular: Negative for chest pain  Gastrointestinal: Negative for constipation, diarrhea, nausea and vomiting  Musculoskeletal: Positive for gait problem and joint swelling  Negative for arthralgias and myalgias  Decreased ROM  Joint stiffness   Skin: Negative for rash  Neurological: Negative for dizziness, seizures and weakness  All other systems reviewed and are negative        Patient Active Problem List   Diagnosis    Essential hypertension    Dyslipidemia    Sacroiliitis (HCC)    Acute pain of right knee    Allergic rhinitis    Fibromyalgia    Hyperlipidemia    Insomnia    Lumbar spondylosis    Lumbar stenosis    Osteoarthrosis, hand    Osteoarthritis of knee    Osteopenia    Paroxysmal atrial fibrillation (HCC)    Peripheral neuropathy    Restless legs syndrome    Right lumbar radiculopathy    TMJ syndrome    Vitamin D deficiency    Effusion of right knee       Past Medical History:   Diagnosis Date    Actinic keratosis     last assessed - 06Jun2014    Arthritis     Atrial fibrillation with rapid ventricular response ([address] )     last assessed - 26Apr2016    Basal cell carcinoma     Benign colon polyp     last assessed - 27Apr2015    Effusion of knee joint right     Resolved - 19Apr2016    Esophageal reflux     Fibromyalgia     last assessed - [id number]    Fibromyalgia, primary     Hypertension     Palpitations     last assessed - 30Apr2013    Peroneal tendonitis, right     last assessed - [id number]    Rectal hemorrhage     last assessed - [id number]    Trochanteric bursitis of left hip 3/9/2018    Trochanteric bursitis, unspecified hip     last assessed - [id number]       Past Surgical History:   Procedure Laterality Date    CATARACT EXTRACTION      COLONOSCOPY  03/2018    EYE SURGERY      HYSTERECTOMY      JOINT REPLACEMENT      RECTAL POLYPECTOMY      REPLACEMENT TOTAL KNEE Left     last assessed - 27Apr2015    TONSILLECTOMY      TOTAL ABDOMINAL HYSTERECTOMY      TUBAL LIGATION         Family History   Problem Relation Age of Onset    Heart disease Mother     Diabetes Mother     Heart disease Father     Coronary artery disease Father     Stroke Father         cerebrovascular accident    Heart attack Father         myocardial infarction    Sudden death Father         scd    Other Family         Back disorder    Coronary artery disease Family     Neuropathy Family     Osteoporosis Family     Anuerysm Neg Hx     Clotting disorder Neg Hx     Arrhythmia Neg Hx     Heart failure Neg Hx        Social History     Occupational History    Not on file       Social History Main Topics    Smoking status: Never Smoker    Smokeless tobacco: Never Used    Alcohol use 0 6 oz/week     1 Glasses of wine per week      Comment: occosional - every 3 months    Drug use: No    Sexual activity: Not on file       Current Outpatient Prescriptions on File Prior to Visit   Medication Sig    amLODIPine (NORVASC) 5 mg tablet Take 1 tablet (5 mg total) by mouth daily    ascorbic acid (VITAMIN C) 500 mg tablet Take 500 mg by mouth 2 (two) times a day    B Complex-C-Folic Acid (B COMPLEX + C TR) TBCR Take by mouth daily   Black Cohosh 20 MG TABS Take by mouth daily      Cholecalciferol ([employer] VITAMIN D) 2000 UNITS CAPS Take 2,000 Units by mouth 2 (two) times a day      Chromium Picolinate (CHROMIUM PICOLATE PO) Take 1 tablet by mouth daily    ELIQUIS 5 MG TAKE ONE TABLET BY MOUTH TWICE DAILY     ezetimibe (ZETIA) 10 mg tablet TAKE 1 TABLET BY MOUTH  DAILY    famotidine (PEPCID) 20 mg tablet Take 20 mg by mouth daily      ferrous gluconate (FERGON) 324 mg tablet Take 324 mg by mouth 2 (two) times a day    folic acid (FOLVITE) 453 MCG tablet Take 400 mcg by mouth daily    losartan (COZAAR) 100 MG tablet Losartan Potassium 100 MG Oral Tablet take 1 tablet by mouth once daily  Quantity: 90;  Refills: 3    Marisela PATRICIA MD;  An Abbasi 16-Oct-2011 Active    rOPINIRole (REQUIP) 0 25 mg tablet Take 1 tablet (0 25 mg total) by mouth daily at bedtime    sotalol (BETAPACE) 120 mg tablet Take 1 tablet (120 mg total) by mouth every 12 (twelve) hours    traMADol (ULTRAM) 50 mg tablet TAKE 1 TABLET BY MOUTH TWO  TIMES DAILY AS NEEDED    zolpidem (AMBIEN) 10 mg tablet Take 1 tablet (10 mg total) by mouth daily at bedtime as needed for sleep    [DISCONTINUED] gabapentin (NEURONTIN) 300 mg capsule 2 capsules at bedtime  (Patient taking differently: Take 300 mg by mouth daily at bedtime 2 capsules at bedtime  )     No current facility-administered medications on file prior to visit          Allergies   Allergen Reactions    Penicillins Other (See Comments)     As a child calcium deposit in the arm     Ace Inhibitors GI Intolerance     Did feel well on it Physical Exam:    /84   Resp 18   Ht 5' 3" (1 6 m)   Wt 73 kg (161 lb)   BMI 28 52 kg/m²     Constitutional:normal, well developed, well nourished, alert, in no distress and non-toxic and no overt pain behavior  and overweight  Eyes:anicteric  HEENT:grossly intact  Neck:supple, symmetric, trachea midline and no masses   Pulmonary:even and unlabored  Cardiovascular:No edema or pitting edema present  Skin:Normal without rashes or lesions and well hydrated  Psychiatric:Mood and affect appropriate  Neurologic:Cranial Nerves II-XII grossly intact  Musculoskeletal:normal    Imaging   MRI LUMBAR SPINE WITHOUT CONTRAST   INDICATION-  Right leg pain   COMPARISON-  None  TECHNIQUE-  Sagittal T1, sagittal T2, sagittal inversion recovery,   axial T1 and axial T2, coronal T2      IMAGE QUALITY-  Diagnostic   FINDINGS-   ALIGNMENT-  Right convex L  to 3 apex scoliosis  Leftward translation   of L1, rightward translation L3, asymmetric endplate changes to the   right L4-5  MARROW SIGNAL-  Normal marrow signal is identified within the   visualized bony structures  No discrete marrow lesion  DISTAL CORD AND CONUS-  Normal size and signal within the distal cord   and conus  The conus ends at the L1 level  PARASPINAL SOFT TISSUES-  Paraspinal soft tissues are unremarkable  SACRUM-  Normal signal within the sacrum  No evidence of insufficiency   or stress fracture  LOWER THORACIC DISC SPACES-  Normal disc height and signal   No disc   herniation, canal stenosis or foraminal narrowing  LUMBAR DISC SPACES-  Minor bulge   L1-L2-  Normal    L2-L3-  Minor bulge, slight facet arthrosis   L3-L4-  Disc extends asymmetrically into the left foramen  Rightward   translation of L3, No definite L3 root compression  L4-L5-  Asymmetric loss of disc height to the right, asymmetric facet   and endplate changes to the right  Moderate right foraminal stenosis  L5-S1-  Right greater than left facet arthrosis    Minor bulge   IMPRESSION-   Multilevel noncompressive degenerative changes    Right convex L2-3 apex   scoliosis with asymmetric degenerative disease   Transcribed on- [id number]

## 2019-01-10 ENCOUNTER — OFFICE VISIT (OUTPATIENT)
Dept: URGENT CARE | Facility: MEDICAL CENTER | Age: 74
End: 2019-01-10
Payer: MEDICARE

## 2019-01-10 VITALS
RESPIRATION RATE: 20 BRPM | WEIGHT: 156.4 LBS | TEMPERATURE: 98.6 F | HEIGHT: 62 IN | DIASTOLIC BLOOD PRESSURE: 84 MMHG | HEART RATE: 63 BPM | BODY MASS INDEX: 28.78 KG/M2 | SYSTOLIC BLOOD PRESSURE: 143 MMHG | OXYGEN SATURATION: 97 %

## 2019-01-10 DIAGNOSIS — J06.9 UPPER RESPIRATORY TRACT INFECTION, UNSPECIFIED TYPE: Primary | ICD-10-CM

## 2019-01-10 PROCEDURE — 99213 OFFICE O/P EST LOW 20 MIN: CPT | Performed by: PHYSICIAN ASSISTANT

## 2019-01-10 PROCEDURE — G0463 HOSPITAL OUTPT CLINIC VISIT: HCPCS | Performed by: PHYSICIAN ASSISTANT

## 2019-01-10 RX ORDER — BENZONATATE 200 MG/1
200 CAPSULE ORAL 3 TIMES DAILY PRN
Qty: 20 CAPSULE | Refills: 0 | Status: SHIPPED | OUTPATIENT
Start: 2019-01-10 | End: 2019-04-04 | Stop reason: ALTCHOICE

## 2019-01-10 RX ORDER — BENZONATATE 200 MG/1
200 CAPSULE ORAL 3 TIMES DAILY PRN
Qty: 20 CAPSULE | Refills: 0 | Status: SHIPPED | OUTPATIENT
Start: 2019-01-10 | End: 2019-01-10 | Stop reason: CLARIF

## 2019-01-10 NOTE — PROGRESS NOTES
[facility]        NAME: Mango Castellano is a 75 y o  female  : 1945    MRN: [id number]  DATE: January 10, 2019  TIME: 3:18 PM    Assessment and Plan   Upper respiratory tract infection, unspecified type [J06 9]  1  Upper respiratory tract infection, unspecified type  benzonatate (TESSALON) 200 MG capsule         Patient Instructions     1  Tylenol as needed for fever  2  Increase fluids  3  Take Tessalon 1 every 8 hours as needed for cough  4  Follow up with PCP in 3-5 days if symptoms persist       Chief Complaint     Chief Complaint   Patient presents with    Cold Like Symptoms     x 5 days, coughing, congestion,          History of Present Illness       The patient is a 50-year-old female presents with a 5 day history of nasal discharge, cough, congestion and postnasal drip  She denies any fever, chills or body aches since the onset of her symptoms  Patient states she has been using over-the-counter Robitussin with no improvement of her symptoms  Review of Systems   Review of Systems   Constitutional: Negative  HENT: Positive for congestion, postnasal drip and rhinorrhea  Respiratory: Positive for cough  Gastrointestinal: Negative  Current Medications       Current Outpatient Prescriptions:     amLODIPine (NORVASC) 5 mg tablet, Take 1 tablet (5 mg total) by mouth daily, Disp: 30 tablet, Rfl: 11    ascorbic acid (VITAMIN C) 500 mg tablet, Take 500 mg by mouth 2 (two) times a day, Disp: , Rfl:     B Complex-C-Folic Acid (B COMPLEX + C TR) TBCR, Take by mouth daily  , Disp: , Rfl:     Black Cohosh 20 MG TABS, Take by mouth daily  , Disp: , Rfl:     Cholecalciferol (CVS VITAMIN D) 2000 UNITS CAPS, Take 2,000 Units by mouth 2 (two) times a day  , Disp: , Rfl:     Chromium Picolinate (CHROMIUM PICOLATE PO), Take 1 tablet by mouth daily, Disp: , Rfl:     ELIQUIS 5 MG, TAKE ONE TABLET BY MOUTH TWICE DAILY , Disp: 60 tablet, Rfl: 2    ezetimibe (ZETIA) 10 mg tablet, TAKE 1 TABLET BY MOUTH  DAILY, Disp: 90 tablet, Rfl: 3    famotidine (PEPCID) 20 mg tablet, Take 20 mg by mouth daily  , Disp: , Rfl:     ferrous gluconate (FERGON) 324 mg tablet, Take 324 mg by mouth 2 (two) times a day, Disp: , Rfl:     folic acid (FOLVITE) 921 MCG tablet, Take 400 mcg by mouth daily, Disp: , Rfl:     gabapentin (NEURONTIN) 300 mg capsule, Take 2 capsules at bedtime  , Disp: 180 capsule, Rfl: 1    losartan (COZAAR) 100 MG tablet, Losartan Potassium 100 MG Oral Tablet take 1 tablet by mouth once daily  Quantity: 90;  Refills: 3    Patel PATRICIA MD;  Started 16-Oct-2011 Active, Disp: , Rfl:     rOPINIRole (REQUIP) 0 25 mg tablet, Take 1 tablet (0 25 mg total) by mouth daily at bedtime, Disp: 90 tablet, Rfl: 1    sotalol (BETAPACE) 120 mg tablet, Take 1 tablet (120 mg total) by mouth every 12 (twelve) hours, Disp: 180 tablet, Rfl: 3    traMADol (ULTRAM) 50 mg tablet, TAKE 1 TABLET BY MOUTH TWO  TIMES DAILY AS NEEDED, Disp: 180 tablet, Rfl: 1    zolpidem (AMBIEN) 10 mg tablet, Take 1 tablet (10 mg total) by mouth daily at bedtime as needed for sleep, Disp: 90 tablet, Rfl: 1    benzonatate (TESSALON) 200 MG capsule, Take 1 capsule (200 mg total) by mouth 3 (three) times a day as needed for cough, Disp: 20 capsule, Rfl: 0    Current Allergies     Allergies as of 01/10/2019 - Reviewed 01/10/2019   Allergen Reaction Noted    Penicillins Other (See Comments) 04/19/2016    Ace inhibitors GI Intolerance 04/19/2016            The following portions of the patient's history were reviewed and updated as appropriate: allergies, current medications, past family history, past medical history, past social history, past surgical history and problem list      Past Medical History:   Diagnosis Date    Actinic keratosis     last assessed - 06Jun2014    Arthritis     Atrial fibrillation with rapid ventricular response ([facility] [address] )     last assessed - 26Apr2016    Basal cell carcinoma     Benign colon polyp     last assessed - [id number]    Effusion of knee joint right     Resolved - [id number]    Esophageal reflux     Fibromyalgia     last assessed - [id number]    Fibromyalgia, primary     Hypertension     Palpitations     last assessed - [id number]    Peroneal tendonitis, right     last assessed - [id number]    Rectal hemorrhage     last assessed - [id number]    Trochanteric bursitis of left hip 3/9/2018    Trochanteric bursitis, unspecified hip     last assessed - [id number]       Past Surgical History:   Procedure Laterality Date    CATARACT EXTRACTION      COLONOSCOPY  03/2018    EYE SURGERY      HYSTERECTOMY      JOINT REPLACEMENT      RECTAL POLYPECTOMY      REPLACEMENT TOTAL KNEE Left     last assessed - [id number]    TONSILLECTOMY      TOTAL ABDOMINAL HYSTERECTOMY      TUBAL LIGATION         Family History   Problem Relation Age of Onset    Heart disease Mother     Diabetes Mother     Heart disease Father     Coronary artery disease Father     Stroke Father         cerebrovascular accident   [facility] Heart attack Father         myocardial infarction   [facility] Sudden death Father         scd    Other Family         Back disorder    Coronary artery disease Family     Neuropathy Family     Osteoporosis Family     Anuerysm Neg Hx     Clotting disorder Neg Hx     Arrhythmia Neg Hx     Heart failure Neg Hx          Medications have been verified  Objective   /84 (BP Location: Left arm, Patient Position: Sitting, Cuff Size: Large)   Pulse 63   Temp 98 6 °F (37 °C) (Tympanic)   Resp 20   Ht 5' 2" (1 575 m)   Wt 70 9 kg (156 lb 6 4 oz)   SpO2 97%   BMI 28 61 kg/m²        Physical Exam     Physical Exam   Constitutional: She appears well-developed and well-nourished  No distress  HENT:   Head: Normocephalic and atraumatic  Right Ear: Tympanic membrane and ear canal normal    Left Ear: Tympanic membrane and ear canal normal    Nose: Rhinorrhea present     Mouth/Throat: Uvula is midline, oropharynx is clear and moist and mucous membranes are normal    Cardiovascular: Normal rate, regular rhythm and normal heart sounds  No murmur heard    Pulmonary/Chest: Effort normal and breath sounds normal

## 2019-01-10 NOTE — PATIENT INSTRUCTIONS
1  Tylenol as needed for fever  2  Increase fluids  3  Take Tessalon 1 every 8 hours as needed for cough  4   Follow up with PCP in 3-5 days if symptoms persist

## 2019-01-14 ENCOUNTER — TELEPHONE (OUTPATIENT)
Dept: CARDIOLOGY CLINIC | Facility: CLINIC | Age: 74
End: 2019-01-14

## 2019-01-14 DIAGNOSIS — I48.0 PAROXYSMAL ATRIAL FIBRILLATION (HCC): ICD-10-CM

## 2019-01-14 NOTE — TELEPHONE ENCOUNTER
Called patient I asked if she called the patient assistance program and she claimed no one gave information prior to her  Per notes 04/2018 I spoke with patient --(Pt called for samples of eliquis  Pt has never picked up order in the pharmacy due to cost  I gave patient phone number to [employer] patient assistance program to call today  Pt given until approved for program 4 packs of eliquis samples LOT # [id number] EXP: march 2020  Pt received samples on 03/27/2018 pending medication sample approval to close encounter ) I provided patient with the patient assistance phone number again to [employer] I advised I can send a refill to pharmacy for her cost of medication is 100 for a months supply, until she gets approval since we have already provided a close to a years worth of samples  She requested medication be sen [facility] [phone]

## 2019-01-16 ENCOUNTER — TELEPHONE (OUTPATIENT)
Dept: CARDIOLOGY CLINIC | Facility: CLINIC | Age: 74
End: 2019-01-16

## 2019-01-16 NOTE — TELEPHONE ENCOUNTER
Per pt she called patient assistance and they told her to switch to Pradaxa and did not submit her info, she stated she spoke to [facility] at [phone]  I called the number for the patient and I spoke with kristina who stated patient doesn't qualify for patient assistance program due to her having medicare and medicare part d that covers most of her eliquis medication leaving the cost to her at 100 dollars for the medication  However if patient switch's to pradaxa she will qualify for the assistance program and have no cost to her medication  Patient advised and she would like to know if she can be switched from eliquis to Pradaxa?

## 2019-01-18 RX ORDER — DABIGATRAN ETEXILATE 150 MG/1
150 CAPSULE, COATED PELLETS ORAL 2 TIMES DAILY
Qty: 180 CAPSULE | Refills: 3 | Status: CANCELLED | OUTPATIENT
Start: 2019-01-18

## 2019-01-18 NOTE — TELEPHONE ENCOUNTER
I placed the order for the Pradaxa 150 BID for your signature and set it to print so that you may sign it for me and have an ma that is working with you fax it to me at [phone], that way I can fax it to the patient assistance program for the patient  I also contacted patient and made her aware of the agreed change

## 2019-01-22 DIAGNOSIS — I48.0 PAROXYSMAL ATRIAL FIBRILLATION (HCC): Primary | ICD-10-CM

## 2019-01-22 DIAGNOSIS — I48.0 PAROXYSMAL ATRIAL FIBRILLATION (HCC): ICD-10-CM

## 2019-01-22 RX ORDER — DABIGATRAN ETEXILATE 150 MG/1
150 CAPSULE, COATED PELLETS ORAL 2 TIMES DAILY
Qty: 60 CAPSULE | Refills: 6 | Status: SHIPPED | OUTPATIENT
Start: 2019-01-22 | End: 2019-01-30 | Stop reason: SDDI

## 2019-01-22 RX ORDER — DABIGATRAN ETEXILATE 150 MG/1
150 CAPSULE, COATED PELLETS ORAL 2 TIMES DAILY
Qty: 60 CAPSULE | Refills: 6 | Status: SHIPPED | OUTPATIENT
Start: 2019-01-22 | End: 2019-01-22 | Stop reason: SDUPTHER

## 2019-01-22 NOTE — TELEPHONE ENCOUNTER
I see your in the diagnostic center  I set it to print can you please sign it and fax it to me at [phone]   Thank you

## 2019-01-22 NOTE — TELEPHONE ENCOUNTER
Spoke with jose who is going to print the script and have provider sign it, to fax to me  I will then send to patient assistance for patient

## 2019-01-23 NOTE — TELEPHONE ENCOUNTER
Patient script was mailed to her so that she may be to  it to the patient assistance program paper work  Due to she stated no fax received by them

## 2019-01-28 NOTE — TELEPHONE ENCOUNTER
When you get a chance if you can contact pt she would like to s/w you in regards to the medication   She said [facility] Savage is a reasonable price for the medication and would like to maybe stay on the Eliquis but wanted to s/w you first

## 2019-01-29 NOTE — TELEPHONE ENCOUNTER
Pt is calling stating she checked with [facility] she will get the eliquis for $75 dollars for a ninety day supply  She apologizes for all the back in forth about her medication but instead of pradaxa she would now like to stay on the eliquis if a refill can be sent to the new pharmacy

## 2019-01-30 ENCOUNTER — TELEPHONE (OUTPATIENT)
Dept: CARDIOLOGY CLINIC | Facility: CLINIC | Age: 74
End: 2019-01-30

## 2019-01-30 DIAGNOSIS — I48.0 PAROXYSMAL ATRIAL FIBRILLATION (HCC): Primary | ICD-10-CM

## 2019-01-30 NOTE — TELEPHONE ENCOUNTER
Pt is calling stating she checked with [facility] she will get the eliquis for $75 dollars for a ninety day supply  She apologizes for all the back in forth about her medication but instead of pradaxa she would now like to stay on the eliquis if a refill can be sent to the new pharmacy she can now afford it

## 2019-02-06 DIAGNOSIS — G47.01 INSOMNIA DUE TO MEDICAL CONDITION: ICD-10-CM

## 2019-02-07 RX ORDER — ZOLPIDEM TARTRATE 10 MG/1
TABLET ORAL
Qty: 23 TABLET | Refills: 3 | Status: SHIPPED | OUTPATIENT
Start: 2019-02-07 | End: 2019-05-16 | Stop reason: SDUPTHER

## 2019-02-19 DIAGNOSIS — M17.11 PRIMARY OSTEOARTHRITIS OF RIGHT KNEE: ICD-10-CM

## 2019-02-19 RX ORDER — TRAMADOL HYDROCHLORIDE 50 MG/1
50 TABLET ORAL 2 TIMES DAILY PRN
Qty: 180 TABLET | Refills: 1 | Status: SHIPPED | OUTPATIENT
Start: 2019-02-19 | End: 2019-07-19 | Stop reason: SDUPTHER

## 2019-02-19 RX ORDER — LOSARTAN POTASSIUM 100 MG/1
TABLET ORAL
Qty: 90 TABLET | OUTPATIENT
Start: 2019-02-19

## 2019-02-20 DIAGNOSIS — G62.9 PERIPHERAL POLYNEUROPATHY: Primary | ICD-10-CM

## 2019-02-20 RX ORDER — LOSARTAN POTASSIUM 100 MG/1
100 TABLET ORAL DAILY
Qty: 90 TABLET | Refills: 3 | Status: SHIPPED | OUTPATIENT
Start: 2019-02-20 | End: 2020-03-09

## 2019-03-25 ENCOUNTER — APPOINTMENT (OUTPATIENT)
Dept: LAB | Facility: CLINIC | Age: 74
End: 2019-03-25
Payer: MEDICARE

## 2019-03-25 ENCOUNTER — OFFICE VISIT (OUTPATIENT)
Dept: CARDIOLOGY CLINIC | Facility: CLINIC | Age: 74
End: 2019-03-25
Payer: MEDICARE

## 2019-03-25 ENCOUNTER — TRANSCRIBE ORDERS (OUTPATIENT)
Dept: LAB | Facility: CLINIC | Age: 74
End: 2019-03-25

## 2019-03-25 VITALS
WEIGHT: 161.6 LBS | HEART RATE: 60 BPM | DIASTOLIC BLOOD PRESSURE: 68 MMHG | HEIGHT: 62 IN | SYSTOLIC BLOOD PRESSURE: 128 MMHG | OXYGEN SATURATION: 97 % | BODY MASS INDEX: 29.74 KG/M2

## 2019-03-25 DIAGNOSIS — I48.0 PAF (PAROXYSMAL ATRIAL FIBRILLATION) (HCC): Primary | ICD-10-CM

## 2019-03-25 DIAGNOSIS — R60.1 GENERALIZED EDEMA: ICD-10-CM

## 2019-03-25 DIAGNOSIS — I10 ESSENTIAL HYPERTENSION: ICD-10-CM

## 2019-03-25 DIAGNOSIS — I48.0 PAF (PAROXYSMAL ATRIAL FIBRILLATION) (HCC): ICD-10-CM

## 2019-03-25 DIAGNOSIS — I48.0 PAROXYSMAL ATRIAL FIBRILLATION (HCC): ICD-10-CM

## 2019-03-25 LAB — NT-PROBNP SERPL-MCNC: 506 PG/ML

## 2019-03-25 PROCEDURE — 99214 OFFICE O/P EST MOD 30 MIN: CPT | Performed by: INTERNAL MEDICINE

## 2019-03-25 PROCEDURE — 36415 COLL VENOUS BLD VENIPUNCTURE: CPT

## 2019-03-25 PROCEDURE — 83880 ASSAY OF NATRIURETIC PEPTIDE: CPT

## 2019-03-25 PROCEDURE — 93000 ELECTROCARDIOGRAM COMPLETE: CPT | Performed by: INTERNAL MEDICINE

## 2019-03-25 NOTE — PROGRESS NOTES
Cardiology Follow Up    Vince Lopez  1945  [id number]  [address]    1  PAF (paroxysmal atrial fibrillation) (HCC)  POCT ECG    NT-BNP PRO   2  Essential hypertension     3  Paroxysmal atrial fibrillation (HCC)     4  Generalized edema         Discussion/Summary:  She is here today for an acute visit  There has been some generalized swelling of the lower extremities which I feel is most likely secondary to dependent edema  She is taking care of her  in assisted living facility has been eating differently likely in taking more sodium  Recommend compression stockings  Follow up with her in a week  Interval History:  Very pleasant female with history of paroxysmal atrial fibrillation, hypertension, hyperlipidemia presents for an acute visit due to lower extremity swelling  She does state her blood pressures were high however they are quite normal on exam today  She describes some lower extremity edema that is worse when she stands on her feet all day long  It does improve when she lays down in bed wakes up in the morning  There has been no medication changes  Her  was in the hospital and up in assisted living facility  Dietary habits have changed  She denies any exertional chest pain or pressure no lightheadedness dizziness syncope      Problem List     Generalized edema    Essential hypertension    Dyslipidemia    Sacroiliitis (HCC)    Acute pain of right knee    Allergic rhinitis    Fibromyalgia    Hyperlipidemia    Insomnia    Lumbar spondylosis    Lumbar stenosis    Osteoarthrosis, hand    Osteoarthritis of knee    Overview Signed 10/4/2018  5:41 PM by Dior Keys MD     Laterality: Right         Osteopenia    Paroxysmal atrial fibrillation (HCC)    Peripheral neuropathy    Restless legs syndrome    Right lumbar radiculopathy    TMJ syndrome    Vitamin D deficiency    Effusion of right knee        Past Medical History:   Diagnosis Date    Actinic keratosis     last assessed - 06Jun2014    Arthritis     Atrial fibrillation with rapid ventricular response (HCC)     last assessed - 26Apr2016    Basal cell carcinoma     Benign colon polyp     last assessed - 27Apr2015    Effusion of knee joint right     Resolved - 19Apr2016    Esophageal reflux     Fibromyalgia     last assessed - [id number]    Fibromyalgia, primary     Hypertension     Palpitations     last assessed - [id number]    Peroneal tendonitis, right     last assessed - [id number]    Rectal hemorrhage     last assessed - [id number]    Trochanteric bursitis of left hip 3/9/2018    Trochanteric bursitis, unspecified hip     last assessed - [id number]     Social History     Socioeconomic History    Marital status: /Civil Union     Spouse name: Not on file    Number of children: Not on file    Years of education: Not on file    Highest education level: Not on file   Occupational History    Not on file   Social Needs    Financial resource strain: Not on file    Food insecurity:     Worry: Not on file     Inability: Not on file    Transportation needs:     Medical: Not on file     Non-medical: Not on file   Tobacco Use    Smoking status: Never Smoker    Smokeless tobacco: Never Used   Substance and Sexual Activity    Alcohol use:  Yes     Alcohol/week: 0 6 oz     Types: 1 Glasses of wine per week     Comment: occosional - every 3 months    Drug use: No    Sexual activity: Not on file   Lifestyle    Physical activity:     Days per week: Not on file     Minutes per session: Not on file    Stress: Not on file   Relationships    Social connections:     Talks on phone: Not on file     Gets together: Not on file     Attends [employer] service: Not on file     Active member of club or organization: Not on file     Attends meetings of clubs or organizations: Not on file     Relationship status: Not on file    Intimate partner violence:     Fear of current or ex partner: Not on file     Emotionally abused: Not on file     Physically abused: Not on file     Forced sexual activity: Not on file   Other Topics Concern    Not on file   Social History Narrative    Not on file      Family History   Problem Relation Age of Onset    Heart disease Mother     Diabetes Mother     Heart disease Father     Coronary artery disease Father     Stroke Father         cerebrovascular accident    Heart attack Father         myocardial infarction    Sudden death Father         scd    Other Family         Back disorder    Coronary artery disease Family     Neuropathy Family     Osteoporosis Family     Anuerysm Neg Hx     Clotting disorder Neg Hx     Arrhythmia Neg Hx     Heart failure Neg Hx      Past Surgical History:   Procedure Laterality Date    CATARACT EXTRACTION      COLONOSCOPY  03/2018    EYE SURGERY      HYSTERECTOMY      JOINT REPLACEMENT      RECTAL POLYPECTOMY      REPLACEMENT TOTAL KNEE Left     last assessed - [id number]    TONSILLECTOMY      TOTAL ABDOMINAL HYSTERECTOMY      TUBAL LIGATION         Current Outpatient Medications:     amLODIPine (NORVASC) 5 mg tablet, Take 1 tablet (5 mg total) by mouth daily, Disp: 30 tablet, Rfl: 11    apixaban (ELIQUIS) 5 mg, Take 1 tablet (5 mg total) by mouth 2 (two) times a day, Disp: 180 tablet, Rfl: 3    ascorbic acid (VITAMIN C) 500 mg tablet, Take 500 mg by mouth 2 (two) times a day, Disp: , Rfl:     B Complex-C-Folic Acid (B COMPLEX + C TR) TBCR, Take by mouth daily  , Disp: , Rfl:     Cholecalciferol (CVS VITAMIN D) 2000 UNITS CAPS, Take 2,000 Units by mouth 2 (two) times a day  , Disp: , Rfl:     Chromium Picolinate (CHROMIUM PICOLATE PO), Take 1 tablet by mouth daily, Disp: , Rfl:     ezetimibe (ZETIA) 10 mg tablet, TAKE 1 TABLET BY MOUTH  DAILY, Disp: 90 tablet, Rfl: 3    famotidine (PEPCID) 20 mg tablet, Take 20 mg by mouth daily  , Disp: , Rfl:    gabapentin (NEURONTIN) 300 mg capsule, Take 2 capsules at bedtime  , Disp: 180 capsule, Rfl: 1    losartan (COZAAR) 100 MG tablet, Take 1 tablet (100 mg total) by mouth daily for 90 days Losartan Potassium 100 MG Oral Tablet take 1 tablet by mouth once daily  Quantity: 90;  Refills: 3    Jadyn PATRICIA MD;  Started 16-Oct-2011 Active, Disp: 90 tablet, Rfl: 3    rOPINIRole (REQUIP) 0 25 mg tablet, Take 1 tablet (0 25 mg total) by mouth daily at bedtime, Disp: 90 tablet, Rfl: 1    sotalol (BETAPACE) 120 mg tablet, Take 1 tablet (120 mg total) by mouth every 12 (twelve) hours, Disp: 180 tablet, Rfl: 3    traMADol (ULTRAM) 50 mg tablet, Take 1 tablet (50 mg total) by mouth 2 (two) times a day as needed for moderate pain, Disp: 180 tablet, Rfl: 1    TURMERIC PO, Take 1 capsule by mouth 2 (two) times a day, Disp: , Rfl:     zolpidem (AMBIEN) 10 mg tablet, TAKE ONE TABLET BY MOUTH AT BEDTIME AS NEEDED FOR SLEEP , Disp: 23 tablet, Rfl: 3    benzonatate (TESSALON) 200 MG capsule, Take 1 capsule (200 mg total) by mouth 3 (three) times a day as needed for cough (Patient not taking: Reported on 3/25/2019), Disp: 20 capsule, Rfl: 0    Black Cohosh 20 MG TABS, Take by mouth daily  , Disp: , Rfl:     ferrous gluconate (FERGON) 324 mg tablet, Take 324 mg by mouth 2 (two) times a day, Disp: , Rfl:     folic acid (FOLVITE) 866 MCG tablet, Take 400 mcg by mouth daily, Disp: , Rfl:   Allergies   Allergen Reactions    Penicillins Other (See Comments)     As a child calcium deposit in the arm     Ace Inhibitors GI Intolerance     Did feel well on it       Labs:     Chemistry        Component Value Date/Time     05/06/2015 1116    K 4 0 09/16/2018 0501    K 4 8 05/06/2015 1116     09/16/2018 0501    CL 99 (L) 05/06/2015 1116    CO2 27 09/16/2018 0501    CO2 28 05/06/2015 1116    BUN 18 09/16/2018 0501    BUN 19 05/06/2015 1116    CREATININE 0 93 09/16/2018 0501    CREATININE 0 97 05/06/2015 1116 Component Value Date/Time    CALCIUM 9 6 09/16/2018 0501    CALCIUM 9 0 05/06/2015 1116    ALKPHOS 58 09/11/2018 1011    ALKPHOS 60 05/06/2015 1116    AST 21 09/11/2018 1011    AST 28 05/06/2015 1116    ALT 29 09/11/2018 1011    ALT 34 05/06/2015 1116    BILITOT 0 78 05/06/2015 1116            Lab Results   Component Value Date    CHOL 205 05/06/2015    CHOL 195 07/10/2014     Lab Results   Component Value Date    HDL 59 09/16/2018    HDL 58 04/25/2018    HDL 50 04/21/2016     Lab Results   Component Value Date    LDLCALC 121 (H) 09/16/2018    LDLCALC 90 04/25/2018    LDLCALC 104 (H) 04/21/2016     Lab Results   Component Value Date    TRIG 109 09/16/2018    TRIG 76 04/25/2018    TRIG 89 04/21/2016     No results found for: CHOLHDL    Imaging: No results found  ECG:  Sinus bradycardia      Review of Systems   Constitution: Negative  HENT: Negative  Eyes: Negative  Cardiovascular: Negative  Negative for leg swelling  Respiratory: Negative  Endocrine: Negative  Hematologic/Lymphatic: Negative  Skin: Negative  Musculoskeletal: Negative  Gastrointestinal: Negative  Genitourinary: Negative  Neurological: Negative  Psychiatric/Behavioral: Negative  Vitals:    03/25/19 1303   BP: 128/68   Pulse: 60   SpO2: 97%     Vitals:    03/25/19 1303   Weight: 73 3 kg (161 lb 9 6 oz)     Height: 5' 2" (157 5 cm)   Body mass index is 29 56 kg/m²  Physical Exam:  Vital  General appearance:  Appears stated age, alert, well appearing and in no distress  HEENT:  PERRLA, EOMI, no scleral icterus, no conjunctival pallor  NECK:  Supple, No elevated JVP, no thyromegaly, no carotid bruits  HEART:  Signs reviewed    Regular rate rhythm positive S1/S2 no murmurs rubs or gallops PMI nondisplaced no carotid bruits  LUNGS:  Clear to auscultation bilaterally no wheezes rales or rhonchi  ABDOMEN:  Soft, non-tender, positive bowel sounds, no rebound or guarding, no organomegaly   EXTREMITIES:  Trace lower extremity edema bilaterally  VASCULAR:  Normal pedal pulses, good pulse volume   SKIN: No lesions or rashes on exposed skin  NEURO:  CN II-XII intact, no focal deficits

## 2019-04-04 ENCOUNTER — OFFICE VISIT (OUTPATIENT)
Dept: FAMILY MEDICINE CLINIC | Facility: CLINIC | Age: 74
End: 2019-04-04
Payer: MEDICARE

## 2019-04-04 VITALS
WEIGHT: 159 LBS | TEMPERATURE: 98.6 F | RESPIRATION RATE: 16 BRPM | BODY MASS INDEX: 28.17 KG/M2 | SYSTOLIC BLOOD PRESSURE: 118 MMHG | HEIGHT: 63 IN | HEART RATE: 66 BPM | DIASTOLIC BLOOD PRESSURE: 70 MMHG

## 2019-04-04 DIAGNOSIS — M25.511 CHRONIC PAIN OF BOTH SHOULDERS: Primary | ICD-10-CM

## 2019-04-04 DIAGNOSIS — M25.512 CHRONIC PAIN OF BOTH SHOULDERS: Primary | ICD-10-CM

## 2019-04-04 DIAGNOSIS — G89.29 CHRONIC PAIN OF BOTH SHOULDERS: Primary | ICD-10-CM

## 2019-04-04 PROBLEM — M25.561 ACUTE PAIN OF RIGHT KNEE: Status: RESOLVED | Noted: 2018-03-09 | Resolved: 2019-04-04

## 2019-04-04 PROCEDURE — 1111F DSCHRG MED/CURRENT MED MERGE: CPT | Performed by: FAMILY MEDICINE

## 2019-04-04 PROCEDURE — 99213 OFFICE O/P EST LOW 20 MIN: CPT | Performed by: FAMILY MEDICINE

## 2019-04-05 ENCOUNTER — APPOINTMENT (OUTPATIENT)
Dept: LAB | Facility: CLINIC | Age: 74
End: 2019-04-05
Payer: MEDICARE

## 2019-04-05 ENCOUNTER — TELEPHONE (OUTPATIENT)
Dept: FAMILY MEDICINE CLINIC | Facility: CLINIC | Age: 74
End: 2019-04-05

## 2019-04-05 DIAGNOSIS — G89.29 CHRONIC PAIN OF BOTH SHOULDERS: ICD-10-CM

## 2019-04-05 DIAGNOSIS — M25.512 CHRONIC PAIN OF BOTH SHOULDERS: ICD-10-CM

## 2019-04-05 DIAGNOSIS — M25.511 CHRONIC PAIN OF BOTH SHOULDERS: ICD-10-CM

## 2019-04-05 LAB
CRP SERPL QL: 9.2 MG/L
ERYTHROCYTE [SEDIMENTATION RATE] IN BLOOD: 25 MM/HOUR (ref 0–20)

## 2019-04-05 PROCEDURE — 36415 COLL VENOUS BLD VENIPUNCTURE: CPT

## 2019-04-05 PROCEDURE — 85652 RBC SED RATE AUTOMATED: CPT

## 2019-04-05 PROCEDURE — 86140 C-REACTIVE PROTEIN: CPT

## 2019-04-10 ENCOUNTER — OFFICE VISIT (OUTPATIENT)
Dept: FAMILY MEDICINE CLINIC | Facility: CLINIC | Age: 74
End: 2019-04-10
Payer: MEDICARE

## 2019-04-10 VITALS
SYSTOLIC BLOOD PRESSURE: 122 MMHG | HEIGHT: 63 IN | TEMPERATURE: 99 F | BODY MASS INDEX: 27.82 KG/M2 | HEART RATE: 62 BPM | DIASTOLIC BLOOD PRESSURE: 70 MMHG | WEIGHT: 157 LBS | RESPIRATION RATE: 16 BRPM

## 2019-04-10 DIAGNOSIS — M70.62 TROCHANTERIC BURSITIS, LEFT HIP: ICD-10-CM

## 2019-04-10 DIAGNOSIS — M75.41 IMPINGEMENT SYNDROME OF RIGHT SHOULDER: ICD-10-CM

## 2019-04-10 DIAGNOSIS — M75.42 IMPINGEMENT SYNDROME OF LEFT SHOULDER: Primary | ICD-10-CM

## 2019-04-10 PROCEDURE — 20610 DRAIN/INJ JOINT/BURSA W/O US: CPT | Performed by: FAMILY MEDICINE

## 2019-04-10 RX ORDER — METHYLPREDNISOLONE ACETATE 40 MG/ML
120 INJECTION, SUSPENSION INTRA-ARTICULAR; INTRALESIONAL; INTRAMUSCULAR; SOFT TISSUE ONCE
Status: COMPLETED | OUTPATIENT
Start: 2019-04-10 | End: 2019-04-10

## 2019-04-10 RX ADMIN — METHYLPREDNISOLONE ACETATE 120 MG: 40 INJECTION, SUSPENSION INTRA-ARTICULAR; INTRALESIONAL; INTRAMUSCULAR; SOFT TISSUE at 14:54

## 2019-05-16 DIAGNOSIS — I10 HYPERTENSION, UNSPECIFIED TYPE: ICD-10-CM

## 2019-05-16 DIAGNOSIS — G47.01 INSOMNIA DUE TO MEDICAL CONDITION: ICD-10-CM

## 2019-05-16 DIAGNOSIS — I48.91 ATRIAL FIBRILLATION, RAPID (HCC): ICD-10-CM

## 2019-05-16 RX ORDER — AMLODIPINE BESYLATE 2.5 MG/1
TABLET ORAL
Qty: 90 TABLET | Refills: 3 | Status: SHIPPED | OUTPATIENT
Start: 2019-05-16 | End: 2019-08-20 | Stop reason: ALTCHOICE

## 2019-05-16 RX ORDER — ZOLPIDEM TARTRATE 10 MG/1
TABLET ORAL
Qty: 90 TABLET | Refills: 1 | Status: SHIPPED | OUTPATIENT
Start: 2019-05-16 | End: 2019-10-15 | Stop reason: SDUPTHER

## 2019-05-16 RX ORDER — SOTALOL HYDROCHLORIDE 120 MG/1
TABLET ORAL
Qty: 180 TABLET | Refills: 3 | Status: SHIPPED | OUTPATIENT
Start: 2019-05-16 | End: 2019-06-24 | Stop reason: SDUPTHER

## 2019-05-31 DIAGNOSIS — G25.81 RLS (RESTLESS LEGS SYNDROME): ICD-10-CM

## 2019-05-31 RX ORDER — ROPINIROLE 0.25 MG/1
0.25 TABLET, FILM COATED ORAL
Qty: 90 TABLET | Refills: 1 | Status: SHIPPED | OUTPATIENT
Start: 2019-05-31 | End: 2019-10-15 | Stop reason: SDUPTHER

## 2019-06-07 ENCOUNTER — OFFICE VISIT (OUTPATIENT)
Dept: CARDIOLOGY CLINIC | Facility: CLINIC | Age: 74
End: 2019-06-07
Payer: MEDICARE

## 2019-06-07 VITALS
BODY MASS INDEX: 29.22 KG/M2 | DIASTOLIC BLOOD PRESSURE: 74 MMHG | HEART RATE: 53 BPM | WEIGHT: 158.8 LBS | HEIGHT: 62 IN | SYSTOLIC BLOOD PRESSURE: 132 MMHG

## 2019-06-07 DIAGNOSIS — I10 ESSENTIAL HYPERTENSION: ICD-10-CM

## 2019-06-07 DIAGNOSIS — E78.5 DYSLIPIDEMIA: ICD-10-CM

## 2019-06-07 DIAGNOSIS — I48.0 PAF (PAROXYSMAL ATRIAL FIBRILLATION) (HCC): Primary | ICD-10-CM

## 2019-06-07 DIAGNOSIS — R60.1 GENERALIZED EDEMA: ICD-10-CM

## 2019-06-07 DIAGNOSIS — I48.0 PAROXYSMAL ATRIAL FIBRILLATION (HCC): ICD-10-CM

## 2019-06-07 PROCEDURE — 93000 ELECTROCARDIOGRAM COMPLETE: CPT | Performed by: INTERNAL MEDICINE

## 2019-06-07 PROCEDURE — 99214 OFFICE O/P EST MOD 30 MIN: CPT | Performed by: INTERNAL MEDICINE

## 2019-06-21 ENCOUNTER — TELEPHONE (OUTPATIENT)
Dept: RADIOLOGY | Facility: CLINIC | Age: 74
End: 2019-06-21

## 2019-06-21 DIAGNOSIS — M79.7 FIBROMYALGIA: ICD-10-CM

## 2019-06-21 DIAGNOSIS — M47.816 LUMBAR SPONDYLOSIS: ICD-10-CM

## 2019-06-21 RX ORDER — GABAPENTIN 300 MG/1
CAPSULE ORAL
Qty: 180 CAPSULE | Refills: 0 | Status: SHIPPED | OUTPATIENT
Start: 2019-06-21 | End: 2019-09-27 | Stop reason: SDUPTHER

## 2019-06-21 RX ORDER — GABAPENTIN 300 MG/1
CAPSULE ORAL
Qty: 180 CAPSULE | Refills: 1 | OUTPATIENT
Start: 2019-06-21

## 2019-06-24 DIAGNOSIS — I48.91 ATRIAL FIBRILLATION, RAPID (HCC): ICD-10-CM

## 2019-06-24 RX ORDER — SOTALOL HYDROCHLORIDE 120 MG/1
120 TABLET ORAL EVERY 12 HOURS
Qty: 180 TABLET | Refills: 0 | Status: SHIPPED | OUTPATIENT
Start: 2019-06-24 | End: 2019-06-27 | Stop reason: SDUPTHER

## 2019-06-27 DIAGNOSIS — I48.91 ATRIAL FIBRILLATION, RAPID (HCC): ICD-10-CM

## 2019-06-27 RX ORDER — SOTALOL HYDROCHLORIDE 120 MG/1
120 TABLET ORAL EVERY 12 HOURS
Qty: 180 TABLET | Refills: 3 | Status: SHIPPED | OUTPATIENT
Start: 2019-06-27 | End: 2020-01-07

## 2019-07-01 ENCOUNTER — TELEPHONE (OUTPATIENT)
Dept: FAMILY MEDICINE CLINIC | Facility: CLINIC | Age: 74
End: 2019-07-01

## 2019-07-01 DIAGNOSIS — M25.511 CHRONIC RIGHT SHOULDER PAIN: ICD-10-CM

## 2019-07-01 DIAGNOSIS — M25.512 CHRONIC LEFT SHOULDER PAIN: Primary | ICD-10-CM

## 2019-07-01 DIAGNOSIS — G89.29 CHRONIC RIGHT SHOULDER PAIN: ICD-10-CM

## 2019-07-01 DIAGNOSIS — G89.29 CHRONIC LEFT SHOULDER PAIN: Primary | ICD-10-CM

## 2019-07-01 NOTE — TELEPHONE ENCOUNTER
Patient called   She is having bilateral shoulder pain which she has discussed with you in the past  She wants to know if you can put orders in for b/l shoulder x-rays

## 2019-07-02 ENCOUNTER — OFFICE VISIT (OUTPATIENT)
Dept: PAIN MEDICINE | Facility: CLINIC | Age: 74
End: 2019-07-02
Payer: MEDICARE

## 2019-07-02 VITALS
RESPIRATION RATE: 18 BRPM | WEIGHT: 158 LBS | HEIGHT: 62 IN | DIASTOLIC BLOOD PRESSURE: 70 MMHG | SYSTOLIC BLOOD PRESSURE: 126 MMHG | BODY MASS INDEX: 29.08 KG/M2

## 2019-07-02 DIAGNOSIS — M70.60 GREATER TROCHANTERIC BURSITIS, UNSPECIFIED LATERALITY: ICD-10-CM

## 2019-07-02 DIAGNOSIS — M79.7 FIBROMYALGIA: ICD-10-CM

## 2019-07-02 DIAGNOSIS — M47.816 LUMBAR SPONDYLOSIS: Primary | ICD-10-CM

## 2019-07-02 DIAGNOSIS — M54.16 LUMBAR RADICULOPATHY: ICD-10-CM

## 2019-07-02 PROCEDURE — 99213 OFFICE O/P EST LOW 20 MIN: CPT | Performed by: NURSE PRACTITIONER

## 2019-07-02 NOTE — PROGRESS NOTES
Pt c/o bilateral shoulder pain that radiates to the arms and bilateral hip pain that radiates to the thighs    Assessment:  1  Lumbar spondylosis    2  Greater trochanteric bursitis, unspecified laterality    3  Fibromyalgia    4  Lumbar radiculopathy        Plan:  Tess Samson is a 75 y o  female with a history of lumbar spondylosis, fibromyalgia and left greater trochanteric bursitis  The patient presents today with ongoing anterior thigh pain and bilateral shoulder pain  The patient recently seen her family doctor and was ordered x-rays of her bilateral shoulders for further evaluation  I discussed with the patient that if her bilateral shoulder x-rays showed osteoarthritis we can discuss proceeding with bilateral intra-articular shoulder injections  She verbalized understanding  She stated she would call the office after the x-rays were completed to possibly discuss proceeding with injections into her shoulders  She does report mild ongoing bilateral anterior thigh pain which is unchanged since last office visit  She has been taking gabapentin 300 mg 2 capsules at bedtime which has been managing her pain and symptoms  However the patient reports that she would like to try to wean off gabapentin at this time  Therefore she was instructed to decrease down to gabapentin 300 mg at bedtime for the next 5 days and then stop taking the medication  She was instructed to call the office if she had difficulty weaning off this medication or experience increased pain after stopping this medication  She verbalized understanding  The patient will follow up on a as needed basis or sooner with the worsening of symptoms  My impressions and treatment recommendations were discussed in detail with the patient who verbalized understanding and had no further questions  Discharge instructions were provided  I personally saw and examined the patient and I agree with the above discussed plan of care      No orders of the defined types were placed in this encounter  No orders of the defined types were placed in this encounter  History of Present Illness:  Tess Samson is a 75 y o  female with a history of lumbar spondylosis, fibromyalgia and left greater trochanteric bursitis  The patient was last seen office on 1/4/2019 where she was continued on gabapentin 300 mg 2 capsules at bedtime  She presents for a follow up office visit in regards to Shoulder Pain (radiates to the arms); Arm Pain; Leg Pain; and Hip Pain (radiates to the thighs)  The patients current symptoms include bilateral shoulder pain that radiates into her arms and bilateral thigh pain  She reports increased shoulder pain that started in the last 5-6 months  She denies any recent injury or trauma  She describes her pain as dull aching, numbness, pins and needles pain that is occasional nature with symptoms worsening during the morning hours  The patient reports that her pain and symptoms are unchanged since last office visit  She currently rates her pain at 2/10 numeric pain scale  Current pain medications includes:  Tramadol 50 mg 2 times daily, gabapentin 300 mg   The patient reports that this regimen is providing 60% pain relief  The patient is reporting no side effects from this pain medication regimen  I have personally reviewed and/or updated the patient's past medical history, past surgical history, family history, social history, current medications, allergies, and vital signs today  Review of Systems   Respiratory: Negative for shortness of breath  Cardiovascular: Negative for chest pain  Gastrointestinal: Negative for constipation, diarrhea, nausea and vomiting  Musculoskeletal: Positive for gait problem and joint swelling  Negative for arthralgias and myalgias  Decreased ROM  Joint stiffness   Skin: Negative for rash  Neurological: Positive for weakness  Negative for dizziness and seizures     All other systems reviewed and are negative        Patient Active Problem List   Diagnosis    Essential hypertension    Dyslipidemia    Sacroiliitis (HCC)    Allergic rhinitis    Fibromyalgia    Hyperlipidemia    Insomnia    Lumbar spondylosis    Lumbar stenosis    Osteoarthrosis, hand    Osteoarthritis of knee    Osteopenia    Paroxysmal atrial fibrillation (HCC)    Peripheral neuropathy    Restless legs syndrome    Right lumbar radiculopathy    TMJ syndrome    Vitamin D deficiency    Effusion of right knee    Generalized edema    PAF [facility]oxysmal atrial fibrillation) ([facility] [address] )       Past Medical History:   Diagnosis Date    Actinic keratosis     last assessed - [id number]    Arthritis     [facility]     last assessed - [id number]    Basal cell carcinoma     Benign colon polyp     last assessed - [id number]    Effusion of knee joint right     Resolved - [id number]    Esophageal reflux     Fibromyalgia     last assessed - [id number]    Fibromyalgia, primary     Hypertension     Palpitations     last assessed - [id number]    Peroneal tendonitis, right     last assessed - [id number]    Rectal hemorrhage     last assessed - 01Oct2014    Trochanteric bursitis of left hip 3/9/2018    Trochanteric bursitis, unspecified hip     last assessed - [id number]       Past Surgical History:   Procedure Laterality Date    CATARACT EXTRACTION      COLONOSCOPY  03/2018    EYE SURGERY      HYSTERECTOMY      JOINT REPLACEMENT      RECTAL POLYPECTOMY      REPLACEMENT TOTAL KNEE Left     last assessed - [id number]    TONSILLECTOMY      TOTAL ABDOMINAL HYSTERECTOMY      TUBAL LIGATION         Family History   Problem Relation Age of Onset    Heart disease Mother     Diabetes Mother     Heart disease Father     Coronary artery disease Father     Stroke Father         cerebrovascular accident    Heart attack Father         myocardial infarction    Sudden death Father         scd    Other Family         Back disorder    Coronary artery disease Family     Neuropathy Family     Osteoporosis Family     Anuerysm Neg Hx     Clotting disorder Neg Hx     Arrhythmia Neg Hx     Heart failure Neg Hx        Social History     Occupational History    Not on file   Tobacco Use    Smoking status: Never Smoker    Smokeless tobacco: Never Used   Substance and Sexual Activity    Alcohol use: Yes     Alcohol/week: 1 0 standard drinks     Types: 1 Glasses of wine per week     Comment: occosional - every 3 months    Drug use: Never    Sexual activity: Not on file       Current Outpatient Medications on File Prior to Visit   Medication Sig    amLODIPine (NORVASC) 2 5 mg tablet TAKE 1 TABLET BY MOUTH  DAILY    amLODIPine (NORVASC) 5 mg tablet Take 1 tablet (5 mg total) by mouth daily    apixaban (ELIQUIS) 5 mg Take 1 tablet (5 mg total) by mouth 2 (two) times a day    ascorbic acid (VITAMIN C) 500 mg tablet Take 500 mg by mouth daily     Black Cohosh 20 MG TABS Take by mouth daily      Cholecalciferol (CVS VITAMIN D) 2000 UNITS CAPS Take 2,000 Units by mouth 2 (two) times a day      Chromium Picolinate (CHROMIUM PICOLATE PO) Take 1 tablet by mouth daily    ezetimibe (ZETIA) 10 mg tablet TAKE 1 TABLET BY MOUTH  DAILY    famotidine (PEPCID) 20 mg tablet Take 20 mg by mouth daily      gabapentin (NEURONTIN) 300 mg capsule Take 2 capsules at bedtime      losartan (COZAAR) 100 MG tablet Take 1 tablet (100 mg total) by mouth daily for 90 days Losartan Potassium 100 MG Oral Tablet take 1 tablet by mouth once daily  Quantity: 90;  Refills: 3    Destiny PATRICIA MD;  Lynette Buckner 16-Oct-2011 Active    rOPINIRole (REQUIP) 0 25 mg tablet Take 1 tablet (0 25 mg total) by mouth daily at bedtime    sotalol (BETAPACE) 120 mg tablet Take 1 tablet (120 mg total) by mouth every 12 (twelve) hours    TOBRADEX ophthalmic ointment APPLY A SMALL AMOUNT INTO THE LEFT EYE TWO TIMES DAILY AS DIRECTED    traMADol (ULTRAM) 50 mg tablet Take 1 tablet (50 mg total) by mouth 2 (two) times a day as needed for moderate pain    TURMERIC PO Take 1 capsule by mouth 2 (two) times a day    zolpidem (AMBIEN) 10 mg tablet TAKE 1 TABLET BY MOUTH  DAILY AT BEDTIME AS NEEDED  FOR SLEEP     No current facility-administered medications on file prior to visit  Allergies   Allergen Reactions    Penicillins Other (See Comments)     As a child calcium deposit in the arm     Ace Inhibitors GI Intolerance     Did feel well on it       Physical Exam:    /70 (BP Location: Right arm, Patient Position: Sitting, Cuff Size: Standard)   Resp 18   Ht 5' 2" (1 575 m)   Wt 71 7 kg (158 lb)   BMI 28 90 kg/m²     Constitutional:normal, well developed, well nourished, alert, in no distress and non-toxic and no overt pain behavior  and overweight  Eyes:anicteric  HEENT:grossly intact  Neck:supple, symmetric, trachea midline and no masses   Pulmonary:even and unlabored  Cardiovascular:No edema or pitting edema present  Skin:Normal without rashes or lesions and well hydrated  Psychiatric:Mood and affect appropriate  Neurologic:Cranial Nerves II-XII grossly intact  Musculoskeletal:normal     Bilateral Shoulder Exam    Appearance:  Normal with no swelling or bruising and no obvious joint deformity  Palpation/Tenderness:  Tenderness on palpation   Active Range of Motion:  Flexion: Minimally limited, Extension: Minimally limited, Abduction: Minimally limited, External Rotation: Minimally limited and Internal Rotation: Minimally limited      Imaging  MRI LUMBAR SPINE WITHOUT CONTRAST   INDICATION-  Right leg pain   COMPARISON-  None  TECHNIQUE-  Sagittal T1, sagittal T2, sagittal inversion recovery,   axial T1 and axial T2, coronal T2      IMAGE QUALITY-  Diagnostic   FINDINGS-   ALIGNMENT-  Right convex L  to 3 apex scoliosis    Leftward translation   of L1, rightward translation L3, asymmetric endplate changes to the right L4-5  MARROW SIGNAL-  Normal marrow signal is identified within the   visualized bony structures  No discrete marrow lesion  DISTAL CORD AND CONUS-  Normal size and signal within the distal cord   and conus  The conus ends at the L1 level  PARASPINAL SOFT TISSUES-  Paraspinal soft tissues are unremarkable  SACRUM-  Normal signal within the sacrum  No evidence of insufficiency   or stress fracture  LOWER THORACIC DISC SPACES-  Normal disc height and signal   No disc   herniation, canal stenosis or foraminal narrowing  LUMBAR DISC SPACES-  Minor bulge   L1-L2-  Normal    L2-L3-  Minor bulge, slight facet arthrosis   L3-L4-  Disc extends asymmetrically into the left foramen  Rightward   translation of L3, No definite L3 root compression  L4-L5-  Asymmetric loss of disc height to the right, asymmetric facet   and endplate changes to the right  Moderate right foraminal stenosis  L5-S1-  Right greater than left facet arthrosis  Minor bulge   IMPRESSION-   Multilevel noncompressive degenerative changes    Right convex L2-3 apex   scoliosis with asymmetric degenerative disease   Transcribed on- [id number]

## 2019-07-05 ENCOUNTER — APPOINTMENT (OUTPATIENT)
Dept: RADIOLOGY | Facility: MEDICAL CENTER | Age: 74
End: 2019-07-05
Payer: MEDICARE

## 2019-07-05 DIAGNOSIS — M25.512 CHRONIC LEFT SHOULDER PAIN: ICD-10-CM

## 2019-07-05 DIAGNOSIS — M25.511 CHRONIC RIGHT SHOULDER PAIN: ICD-10-CM

## 2019-07-05 DIAGNOSIS — G89.29 CHRONIC RIGHT SHOULDER PAIN: ICD-10-CM

## 2019-07-05 DIAGNOSIS — G89.29 CHRONIC LEFT SHOULDER PAIN: ICD-10-CM

## 2019-07-05 PROCEDURE — 73030 X-RAY EXAM OF SHOULDER: CPT

## 2019-07-11 DIAGNOSIS — I10 HYPERTENSION, UNSPECIFIED TYPE: ICD-10-CM

## 2019-07-11 RX ORDER — AMLODIPINE BESYLATE 5 MG/1
5 TABLET ORAL DAILY
Qty: 90 TABLET | Refills: 3 | Status: SHIPPED | OUTPATIENT
Start: 2019-07-11 | End: 2020-05-10

## 2019-07-17 ENCOUNTER — TELEPHONE (OUTPATIENT)
Dept: PAIN MEDICINE | Facility: CLINIC | Age: 74
End: 2019-07-17

## 2019-07-17 NOTE — TELEPHONE ENCOUNTER
Dr Herbert's patient    Patient is requesting to schedule her first injection for bilateral arms   Please advise, [facility]    Call back# [phone]

## 2019-07-18 DIAGNOSIS — M19.019 OSTEOARTHRITIS OF SHOULDER, UNSPECIFIED LATERALITY, UNSPECIFIED OSTEOARTHRITIS TYPE: Primary | ICD-10-CM

## 2019-07-18 NOTE — TELEPHONE ENCOUNTER
Order placed for bilateral shoulder injections as discussed last office visit were placed in [address] [place] [address] Rd  Please call and schedule the patient for these injections

## 2019-07-19 DIAGNOSIS — M17.11 PRIMARY OSTEOARTHRITIS OF RIGHT KNEE: ICD-10-CM

## 2019-07-19 RX ORDER — TRAMADOL HYDROCHLORIDE 50 MG/1
TABLET ORAL
Qty: 180 TABLET | Refills: 1 | Status: SHIPPED | OUTPATIENT
Start: 2019-07-19 | End: 2019-10-02 | Stop reason: SDUPTHER

## 2019-07-19 NOTE — TELEPHONE ENCOUNTER
Left message for patient to call office, inquiring which pharmacy she wants her Tramadol to go to mail order or retail?

## 2019-07-31 ENCOUNTER — TELEPHONE (OUTPATIENT)
Dept: CARDIOLOGY CLINIC | Facility: CLINIC | Age: 74
End: 2019-07-31

## 2019-07-31 NOTE — TELEPHONE ENCOUNTER
Eliquis pt was told to call for samples per Marisa Mckinnon    [phone]  Patient has asked for a call back,thanks

## 2019-08-20 ENCOUNTER — HOSPITAL ENCOUNTER (OUTPATIENT)
Dept: RADIOLOGY | Facility: CLINIC | Age: 74
Discharge: HOME | End: 2019-08-20
Attending: ANESTHESIOLOGY
Payer: MEDICARE

## 2019-08-20 VITALS
TEMPERATURE: 97.7 F | HEART RATE: 48 BPM | OXYGEN SATURATION: 100 % | RESPIRATION RATE: 20 BRPM | SYSTOLIC BLOOD PRESSURE: 137 MMHG | DIASTOLIC BLOOD PRESSURE: 64 MMHG

## 2019-08-20 DIAGNOSIS — M19.019 OSTEOARTHRITIS OF SHOULDER, UNSPECIFIED LATERALITY, UNSPECIFIED OSTEOARTHRITIS TYPE: ICD-10-CM

## 2019-08-20 PROCEDURE — 77002 NEEDLE LOCALIZATION BY XRAY: CPT

## 2019-08-20 PROCEDURE — 20610 DRAIN/INJ JOINT/BURSA W/O US: CPT | Performed by: ANESTHESIOLOGY

## 2019-08-20 PROCEDURE — 77002 NEEDLE LOCALIZATION BY XRAY: CPT | Performed by: ANESTHESIOLOGY

## 2019-08-20 RX ORDER — METHYLPREDNISOLONE ACETATE 80 MG/ML
80 INJECTION, SUSPENSION INTRA-ARTICULAR; INTRALESIONAL; INTRAMUSCULAR; PARENTERAL; SOFT TISSUE ONCE
Status: COMPLETED | OUTPATIENT
Start: 2019-08-20 | End: 2019-08-20

## 2019-08-20 RX ORDER — BUPIVACAINE HCL/PF 2.5 MG/ML
10 VIAL (ML) INJECTION ONCE
Status: COMPLETED | OUTPATIENT
Start: 2019-08-20 | End: 2019-08-20

## 2019-08-20 RX ORDER — 0.9 % SODIUM CHLORIDE 0.9 %
10 VIAL (ML) INJECTION ONCE
Status: COMPLETED | OUTPATIENT
Start: 2019-08-20 | End: 2019-08-20

## 2019-08-20 RX ADMIN — BUPIVACAINE HYDROCHLORIDE 7 ML: 2.5 INJECTION, SOLUTION EPIDURAL; INFILTRATION; INTRACAUDAL at 09:39

## 2019-08-20 RX ADMIN — Medication 3 ML: at 09:37

## 2019-08-20 RX ADMIN — IOHEXOL 1 ML: 300 INJECTION, SOLUTION INTRAVENOUS at 09:38

## 2019-08-20 RX ADMIN — SODIUM CHLORIDE 3 ML: 9 INJECTION, SOLUTION INTRAMUSCULAR; INTRAVENOUS; SUBCUTANEOUS at 09:37

## 2019-08-20 RX ADMIN — METHYLPREDNISOLONE ACETATE 80 MG: 80 INJECTION, SUSPENSION INTRA-ARTICULAR; INTRALESIONAL; INTRAMUSCULAR; PARENTERAL; SOFT TISSUE at 09:39

## 2019-08-20 NOTE — H&P
History of Present Illness: The patient is a 75 y o  female who presents with complaints of bilateral shoulder pain secondary to osteoarthritis and is here today for bilateral shoulder intra-articular steroid injections      Patient Active Problem List   Diagnosis    Essential hypertension    Dyslipidemia    [facility]iliitis ([facility]    Allergic rhinitis    Fibromyalgia    Hyperlipidemia    Insomnia    Lumbar spondylosis    Lumbar stenosis    Osteoarthrosis, hand    Osteoarthritis of knee    Osteopenia    Paroxysmal atrial fibrillation ([facility])    Peripheral neuropathy    Restless legs syndrome    Right lumbar radiculopathy    TMJ syndrome    Vitamin D deficiency    Effusion of right knee    Generalized edema    PAF (paroxysmal atrial fibrillation) ([facility])       Past Medical History:   Diagnosis Date    Actinic keratosis     last assessed - [id number]    Arthritis     Atrial fibrillation with rapid ventricular response ([facility])     last assessed - [id number]    Basal cell carcinoma     Benign colon polyp     last assessed - [id number]    Effusion of knee joint right     Resolved - [id number]    Esophageal reflux     Fibromyalgia     last assessed - [id number]    Fibromyalgia, primary     Hypertension     Palpitations     last assessed - [id number]    Peroneal tendonitis, right     last assessed - [id number]    Rectal hemorrhage     last assessed - [id number]    Trochanteric bursitis of left hip 3/9/2018    Trochanteric bursitis, unspecified hip     last assessed - [id number]       Past Surgical History:   Procedure Laterality Date    CATARACT EXTRACTION      COLONOSCOPY  03/2018    EYE SURGERY      HYSTERECTOMY      JOINT REPLACEMENT      RECTAL POLYPECTOMY      REPLACEMENT TOTAL KNEE Left     last assessed - [id number]    TONSILLECTOMY      TOTAL ABDOMINAL HYSTERECTOMY      TUBAL LIGATION           Current Outpatient Medications:     amLODIPine (NORVASC) 5 mg tablet, Take 1 tablet (5 mg total) by mouth daily, Disp: 90 tablet, Rfl: 3    apixaban (ELIQUIS) 5 mg, Take 1 tablet (5 mg total) by mouth 2 (two) times a day, Disp: 180 tablet, Rfl: 3    ascorbic acid (VITAMIN C) 500 mg tablet, Take 500 mg by mouth daily , Disp: , Rfl:     Cholecalciferol (CVS VITAMIN D) 2000 UNITS CAPS, Take 2,000 Units by mouth 2 (two) times a day  , Disp: , Rfl:     Chromium Picolinate (CHROMIUM PICOLATE PO), Take 1 tablet by mouth daily, Disp: , Rfl:     ezetimibe (ZETIA) 10 mg tablet, TAKE 1 TABLET BY MOUTH  DAILY, Disp: 90 tablet, Rfl: 3    famotidine (PEPCID) 20 mg tablet, Take 20 mg by mouth daily  , Disp: , Rfl:     gabapentin (NEURONTIN) 300 mg capsule, Take 2 capsules at bedtime  , Disp: 180 capsule, Rfl: 0    losartan (COZAAR) 100 MG tablet, Take 1 tablet (100 mg total) by mouth daily for 90 days Losartan Potassium 100 MG Oral Tablet take 1 tablet by mouth once daily  Quantity: 90;  Refills: 3    Nettie PATRICIA MD;  Started 16-Oct-2011 Active, Disp: 90 tablet, Rfl: 3    rOPINIRole (REQUIP) 0 25 mg tablet, Take 1 tablet (0 25 mg total) by mouth daily at bedtime, Disp: 90 tablet, Rfl: 1    sotalol (BETAPACE) 120 mg tablet, Take 1 tablet (120 mg total) by mouth every 12 (twelve) hours, Disp: 180 tablet, Rfl: 3    traMADol (ULTRAM) 50 mg tablet, TAKE 1 TABLET BY MOUTH  TWICE A DAY AS NEEDED FOR  MODERATE PAIN, Disp: 180 tablet, Rfl: 1    TURMERIC PO, Take 1 capsule by mouth 2 (two) times a day, Disp: , Rfl:     zolpidem (AMBIEN) 10 mg tablet, TAKE 1 TABLET BY MOUTH  DAILY AT BEDTIME AS NEEDED  FOR SLEEP, Disp: 90 tablet, Rfl: 1    Current Facility-Administered Medications:     bupivacaine (PF) (MARCAINE) 0 25 % injection 10 mL, 10 mL, Intra-articular, Once, Dorothea Uribe MD    iohexol (OMNIPAQUE) 300 mg/mL injection 50 mL, 50 mL, Intra-articular, Once, Dorothea Uribe MD    lidocaine (PF) (XYLOCAINE-MPF) 2 % injection 5 mL, 5 mL, Infiltration, Once, Dorothea Uribe MD    methylPREDNISolone acetate (DEPO-MEDROL) injection 80 mg, 80 mg, Intra-articular, Once, Bekah Acosta MD    sodium chloride (PF) 0 9 % injection 10 mL, 10 mL, Infiltration, Once, Bekah Acosta MD    Allergies   Allergen Reactions    Penicillins Other (See Comments)     As a child calcium deposit in the arm     Ace Inhibitors GI Intolerance     Did feel well on it       Physical Exam:   Vitals:    08/20/19 0911   BP: 140/65   Pulse: (!) 48   Resp: 18   Temp: 97 7 °F (36 5 °C)   SpO2: 97%     General: Awake, Alert, Oriented x 3, Mood and affect appropriate  Respiratory: Respirations even and unlabored  Cardiovascular: Peripheral pulses intact; no edema  Musculoskeletal Exam:   Bilateral shoulder pain    ASA Score: 2    Patient/Chart Verification  Patient ID Verified: Verbal  ID Band Applied: No  Consents Confirmed: Procedural, To be obtained in the Pre-Procedure area  H&P( within 30 days) Verified: To be obtained in the Pre-Procedure area  Interval H&P(within 24 hr) Complete (required for Outpatients and Surgery Admit only): To be obtained in the Pre-Procedure area  Allergies Reviewed: Yes  Anticoag/NSAID held?: NA  Currently on antibiotics?: No    Assessment:   1   Osteoarthritis of shoulder, unspecified laterality, unspecified osteoarthritis type        Plan: Bilateral intra-articular shoulder injections

## 2019-08-20 NOTE — DISCHARGE INSTR - LAB
Steroid Joint Injection   WHAT YOU NEED TO KNOW:   A steroid joint injection is a procedure to inject steroid medicine into a joint  Steroid medicine decreases pain and inflammation  The injection may also contain an anesthetic (numbing medicine) to decrease pain  It may be done to treat conditions such as arthritis, gout, or carpal tunnel syndrome  The injections may be given in your knee, ankle, shoulder, elbow, wrist, ankle or sacroiliac joint  1  Do not apply heat to any area that is numb  If you have discomfort or soreness at the injection site, you may apply ice today, 20 minutes on and 20 minutes off  Tomorrow you may use ice or warm, moist heat  Do not apply ice or heat directly to the skin  2  You may have an increase or change in the discomfort for 36-48 hours after your treatment  Apply ice and continue with any pain medicine you have been prescribed  3  Do not do anything strenuous today  You may shower, but no tub baths or hot tubs today  You may resume your normal activities tomorrow, but do not overdo it  Resume normal activities slowly when you are feeling better  4  If you experience redness, drainage or swelling at the injection site, or if you develop a fever above 100 degrees, please call [facility] at [phone] or go to the Emergency Room  5  Continue to take all routine medicines prescribed by your primary care physician unless otherwise instructed by our staff  Most blood thinners should be started again according to your regularly scheduled dosing  If you have any questions, please give our office a call  If you have a problem specifically related to your procedure, please call our office at [phone]  Problems not related to your procedure should be directed to your primary care physician

## 2019-08-26 ENCOUNTER — ANNUAL EXAM (OUTPATIENT)
Dept: OBGYN CLINIC | Facility: CLINIC | Age: 74
End: 2019-08-26
Payer: MEDICARE

## 2019-08-26 VITALS
HEIGHT: 62 IN | WEIGHT: 150 LBS | DIASTOLIC BLOOD PRESSURE: 70 MMHG | BODY MASS INDEX: 27.6 KG/M2 | SYSTOLIC BLOOD PRESSURE: 140 MMHG

## 2019-08-26 DIAGNOSIS — Z12.39 SCREENING FOR BREAST CANCER: ICD-10-CM

## 2019-08-26 DIAGNOSIS — Z01.419 ENCOUNTER FOR WELL WOMAN EXAM: Primary | ICD-10-CM

## 2019-08-26 DIAGNOSIS — Z78.0 POSTMENOPAUSAL: ICD-10-CM

## 2019-08-26 PROCEDURE — G0101 CA SCREEN;PELVIC/BREAST EXAM: HCPCS | Performed by: PHYSICIAN ASSISTANT

## 2019-08-26 NOTE — PROGRESS NOTES
Assessment/Plan:    No problem-specific Assessment & Plan notes found for this encounter  Diagnoses and all orders for this visit:    Encounter for well woman exam    Screening for breast cancer  -     Mammo screening bilateral w 3d & cad; Future    Postmenopausal          Subjective:      Patient ID: Agnes Gordon is a 75 y o  female  Pt presents for her annual exam today--  She has no complaints  She has no bleeding or pelvic pain--hyster  Bowel and bladder are regular  Colonoscopy--2017  No breast concerns today  Last mammo--9/18    No pap today  rx mammo  dexa 2018--[facility]      The following portions of the patient's history were reviewed and updated as appropriate: allergies, current medications, past family history, past medical history, past social history, past surgical history and problem list     Review of Systems   Constitutional: Negative for chills, fever and unexpected weight change  Gastrointestinal: Negative for abdominal pain, blood in stool, constipation and diarrhea  Genitourinary: Negative  Objective:      /70 (BP Location: Left arm, Patient Position: Sitting, Cuff Size: Standard)   Ht 5' 2" (1 575 m)   Wt 68 kg (150 lb)   LMP 02/01/1990 (Within Weeks)   BMI 27 44 kg/m²          Physical Exam   Constitutional: She is oriented to person, place, and time  She appears well-developed and well-nourished  HENT:   Head: Normocephalic and atraumatic  Neck: Normal range of motion  Pulmonary/Chest: Right breast exhibits no inverted nipple, no mass, no nipple discharge and no skin change  Left breast exhibits no inverted nipple, no mass, no nipple discharge and no skin change  Abdominal: Soft  Genitourinary: Rectum normal and vagina normal  Pelvic exam was performed with patient supine  There is no rash, tenderness or lesion on the right labia  There is no rash, tenderness or lesion on the left labia   Right adnexum displays no mass, no tenderness and no fullness  Left adnexum displays no mass, no tenderness and no fullness  Genitourinary Comments: Uterus surgically absent   Lymphadenopathy: No inguinal adenopathy noted on the right or left side  Neurological: She is alert and oriented to person, place, and time  Skin: Skin is warm and dry  Psychiatric: She has a normal mood and affect  Nursing note and vitals reviewed

## 2019-08-27 ENCOUNTER — TELEPHONE (OUTPATIENT)
Dept: PAIN MEDICINE | Facility: CLINIC | Age: 74
End: 2019-08-27

## 2019-09-18 DIAGNOSIS — M47.816 LUMBAR SPONDYLOSIS: ICD-10-CM

## 2019-09-18 DIAGNOSIS — E78.00 HYPERCHOLESTEREMIA: ICD-10-CM

## 2019-09-18 DIAGNOSIS — M79.7 FIBROMYALGIA: ICD-10-CM

## 2019-09-18 RX ORDER — EZETIMIBE 10 MG/1
TABLET ORAL
Qty: 90 TABLET | Refills: 3 | Status: SHIPPED | OUTPATIENT
Start: 2019-09-18 | End: 2020-09-04

## 2019-09-19 RX ORDER — GABAPENTIN 300 MG/1
CAPSULE ORAL
Qty: 180 CAPSULE | Refills: 0 | OUTPATIENT
Start: 2019-09-19

## 2019-09-26 ENCOUNTER — IMMUNIZATIONS (OUTPATIENT)
Dept: FAMILY MEDICINE CLINIC | Facility: CLINIC | Age: 74
End: 2019-09-26
Payer: MEDICARE

## 2019-09-26 DIAGNOSIS — Z23 NEED FOR VACCINATION: Primary | ICD-10-CM

## 2019-09-26 PROCEDURE — 90662 IIV NO PRSV INCREASED AG IM: CPT

## 2019-09-26 PROCEDURE — G0008 ADMIN INFLUENZA VIRUS VAC: HCPCS

## 2019-09-27 ENCOUNTER — TELEPHONE (OUTPATIENT)
Dept: PAIN MEDICINE | Facility: CLINIC | Age: 74
End: 2019-09-27

## 2019-09-27 DIAGNOSIS — M79.7 FIBROMYALGIA: ICD-10-CM

## 2019-09-27 DIAGNOSIS — M47.816 LUMBAR SPONDYLOSIS: ICD-10-CM

## 2019-09-27 RX ORDER — GABAPENTIN 300 MG/1
CAPSULE ORAL
Qty: 180 CAPSULE | Refills: 0 | Status: SHIPPED | OUTPATIENT
Start: 2019-09-27 | End: 2020-01-09 | Stop reason: ALTCHOICE

## 2019-09-27 NOTE — TELEPHONE ENCOUNTER
See message below for gabapentin RF  Pt confirmed 300 mg (2) at bedtime is working and she has no s/e from it  Pls send RF to [facility]  Last prescribed 6/21/19, #180 with no RF  Last ov 7/2/19, no pending ov

## 2019-09-27 NOTE — TELEPHONE ENCOUNTER
Prescription for gabapentin 300 mg 2 capsules at bedtime was sent to the patient's [facility] pharmacy on file  Advance activity as tolerated.  Continue all previously prescribed medications as directed unless otherwise instructed.  Take probiotics as directed.  Take Motrin (also sold as Advil or Ibuprofen) 400-600 mg (two or three 200 mg over the counter pills) every 8 hours as needed for moderate pain -- take with food. Take Tylenol 650mg (Two 325 mg pills) every 4-6 hours as needed for pain. Follow up with your primary care physician and gastroenterology (referral list provided) in 48-72 hours- bring copies of your results.  Return to the ER for worsening or persistent symptoms, including but no limited to fevers, bloody stools, worsening/persistent abdominal pain and/or ANY NEW OR CONCERNING SYMPTOMS. If you have issues obtaining follow up, please call: [phone] to obtain a doctor or specialist who takes your insurance in your area.  You may call [phone] to make an appointment with the internal medicine clinic.

## 2019-09-27 NOTE — TELEPHONE ENCOUNTER
Pt contacted Call Center requested refill of their medication   # [id number]    Medication Name:  gabapentin (NEURONTIN) 300 mg capsule    Frequency of Med:  Sig: Take 2 capsules at bedtime      Remaining Medication:  Enough for 1 week    Pharmacy and Location:  [facility]

## 2019-10-02 DIAGNOSIS — M17.11 PRIMARY OSTEOARTHRITIS OF RIGHT KNEE: ICD-10-CM

## 2019-10-02 RX ORDER — TRAMADOL HYDROCHLORIDE 50 MG/1
50 TABLET ORAL 2 TIMES DAILY PRN
Qty: 30 TABLET | Refills: 1 | Status: SHIPPED | OUTPATIENT
Start: 2019-10-02 | End: 2019-12-02 | Stop reason: SDUPTHER

## 2019-10-02 NOTE — TELEPHONE ENCOUNTER
Patient called  Her insurance will only authorize 30 pills at a time  Please send # 30 directly to [facility]

## 2019-10-04 ENCOUNTER — HOSPITAL ENCOUNTER (OUTPATIENT)
Dept: RADIOLOGY | Facility: MEDICAL CENTER | Age: 74
Discharge: HOME | End: 2019-10-04
Payer: MEDICARE

## 2019-10-04 VITALS — HEIGHT: 62 IN | BODY MASS INDEX: 27.6 KG/M2 | WEIGHT: 150 LBS

## 2019-10-04 DIAGNOSIS — Z12.39 SCREENING FOR BREAST CANCER: ICD-10-CM

## 2019-10-04 PROCEDURE — 77063 BREAST TOMOSYNTHESIS BI: CPT

## 2019-10-04 PROCEDURE — 77067 SCR MAMMO BI INCL CAD: CPT

## 2019-10-15 DIAGNOSIS — G25.81 RLS (RESTLESS LEGS SYNDROME): ICD-10-CM

## 2019-10-15 DIAGNOSIS — G47.01 INSOMNIA DUE TO MEDICAL CONDITION: ICD-10-CM

## 2019-10-15 RX ORDER — ZOLPIDEM TARTRATE 10 MG/1
TABLET ORAL
Qty: 90 TABLET | Refills: 1 | Status: SHIPPED | OUTPATIENT
Start: 2019-10-15 | End: 2020-04-20

## 2019-10-15 RX ORDER — ROPINIROLE 0.25 MG/1
TABLET, FILM COATED ORAL
Qty: 90 TABLET | Refills: 3 | Status: SHIPPED | OUTPATIENT
Start: 2019-10-15 | End: 2020-10-02

## 2019-10-23 ENCOUNTER — OFFICE VISIT (OUTPATIENT)
Dept: OBGYN CLINIC | Facility: HOSPITAL | Age: 74
End: 2019-10-23
Payer: MEDICARE

## 2019-10-23 VITALS
WEIGHT: 150 LBS | DIASTOLIC BLOOD PRESSURE: 70 MMHG | SYSTOLIC BLOOD PRESSURE: 143 MMHG | HEART RATE: 60 BPM | BODY MASS INDEX: 27.6 KG/M2 | HEIGHT: 62 IN

## 2019-10-23 DIAGNOSIS — G56.03 CARPAL TUNNEL SYNDROME, BILATERAL: Primary | ICD-10-CM

## 2019-10-23 PROCEDURE — 99212 OFFICE O/P EST SF 10 MIN: CPT | Performed by: ORTHOPAEDIC SURGERY

## 2019-10-23 NOTE — PROGRESS NOTES
Assessment:  1  Carpal tunnel syndrome, bilateral         Plan:  The patient will follow up in one week for repeat exam in our [address] office  To do next visit:  Return in about 1 week (around 10/30/2019)  The above stated was discussed in layman's terms and the patient expressed understanding  All questions were answered to the patient's satisfaction  Scribe Attestation    I,:   Ralph Story am acting as a scribe while in the presence of the attending physician :        I,:   Oli Kuhn MD personally performed the services described in this documentation    as scribed in my presence :              Subjective:   Shira Fraser is a 75 y o  female who presents for bilateral arm pain  She has had longstanding symptoms yet worse over the past 4-5 months  Today she complains of right > left entire hand, forearm and upper arm to the shoulder pain and numbness  She denies neck pain  She denies recent bowel or bladder changes  Reading a book, watching TV and sleeping aggravates  Wiggling hands sometimes alleviates  She denies medications for these symptoms  She does wear night splints of wrist with some benefit  She denies past physical therapy or surgery on the wrists or hands          Review of systems negative unless otherwise specified in HPI    Past Medical History:   Diagnosis Date    Actinic keratosis     last assessed - [id number]    Arthritis     Atrial fibrillation with rapid ventricular response (HCC)     last assessed - [id number]    Basal cell carcinoma     Benign colon polyp     last assessed - [id number]    Effusion of knee joint right     Resolved - [id number]    Esophageal reflux     Fibromyalgia     last assessed - [id number]    Fibromyalgia, primary     Hypertension     Palpitations     last assessed - [id number]    Peroneal tendonitis, right     last assessed - [id number]    Rectal hemorrhage     last assessed - [id number]    Trochanteric bursitis of left hip 3/9/2018    Trochanteric bursitis, unspecified hip     last assessed - [id number]       Past Surgical History:   Procedure Laterality Date    CATARACT EXTRACTION      COLONOSCOPY  03/2018    EYE SURGERY      HYSTERECTOMY      JOINT REPLACEMENT      RECTAL POLYPECTOMY      REPLACEMENT TOTAL KNEE Left     last assessed - [id number]    TONSILLECTOMY      TOTAL ABDOMINAL HYSTERECTOMY      TUBAL LIGATION         Family History   Problem Relation Age of Onset    Heart disease Mother     Diabetes Mother     Heart disease Father     Coronary artery disease Father     Stroke Father         cerebrovascular accident    Heart attack Father         myocardial infarction    Sudden death Father         scd    Other Family         Back disorder    Coronary artery disease Family     Neuropathy Family     Osteoporosis Family     No Known Problems Daughter     No Known Problems Maternal Grandmother     No Known Problems Maternal Grandfather     No Known Problems Paternal Grandmother     No Known Problems Paternal Grandfather     Cancer Maternal Uncle     Breast cancer Maternal Aunt 72    No Known Problems Son     No Known Problems Maternal Aunt     No Known Problems Maternal Aunt     No Known Problems Maternal Aunt     No Known Problems Paternal Aunt     No Known Problems Paternal Aunt     Anuerysm Neg Hx     Clotting disorder Neg Hx     Arrhythmia Neg Hx     Heart failure Neg Hx        Social History     Occupational History    Not on file   Tobacco Use    Smoking status: Never Smoker    Smokeless tobacco: Never Used   Substance and Sexual Activity    Alcohol use: Not Currently     Comment: occosional - every 3 months    Drug use: Never    Sexual activity: Not Currently         Current Outpatient Medications:     amLODIPine (NORVASC) 5 mg tablet, Take 1 tablet (5 mg total) by mouth daily, Disp: 90 tablet, Rfl: 3    apixaban (ELIQUIS) 5 mg, Take 1 tablet (5 mg total) by mouth 2 (two) times a day, Disp: 180 tablet, Rfl: 3    ascorbic acid (VITAMIN C) 500 mg tablet, Take 500 mg by mouth daily , Disp: , Rfl:     Cholecalciferol (CVS VITAMIN D) 2000 UNITS CAPS, Take 2,000 Units by mouth 2 (two) times a day  , Disp: , Rfl:     Chromium Picolinate (CHROMIUM PICOLATE PO), Take 1 tablet by mouth daily, Disp: , Rfl:     ezetimibe (ZETIA) 10 mg tablet, TAKE 1 TABLET BY MOUTH  DAILY, Disp: 90 tablet, Rfl: 3    famotidine (PEPCID) 20 mg tablet, Take 20 mg by mouth daily  , Disp: , Rfl:     gabapentin (NEURONTIN) 300 mg capsule, Take 2 capsules at bedtime  , Disp: 180 capsule, Rfl: 0    losartan (COZAAR) 100 MG tablet, Take 1 tablet (100 mg total) by mouth daily for 90 days Losartan Potassium 100 MG Oral Tablet take 1 tablet by mouth once daily  Quantity: 90;  Refills: 3    Andi PATRICIA MD;  Started 16-Oct-2011 Active, Disp: 90 tablet, Rfl: 3    rOPINIRole (REQUIP) 0 25 mg tablet, TAKE 1 TABLET BY MOUTH  DAILY AT BEDTIME, Disp: 90 tablet, Rfl: 3    sotalol (BETAPACE) 120 mg tablet, Take 1 tablet (120 mg total) by mouth every 12 (twelve) hours, Disp: 180 tablet, Rfl: 3    traMADol (ULTRAM) 50 mg tablet, Take 1 tablet (50 mg total) by mouth 2 (two) times a day as needed for moderate pain, Disp: 30 tablet, Rfl: 1    TURMERIC PO, Take 1 capsule by mouth 2 (two) times a day, Disp: , Rfl:     zolpidem (AMBIEN) 10 mg tablet, TAKE 1 TABLET BY MOUTH  DAILY AT BEDTIME AS NEEDED  FOR SLEEP, Disp: 90 tablet, Rfl: 1    Allergies   Allergen Reactions    Penicillins Other (See Comments)     As a child calcium deposit in the arm     Ace Inhibitors GI Intolerance     Did feel well on it            Vitals:    10/23/19 1534   BP: 143/70   Pulse: 60       Objective:  Physical exam  · General: Awake, Alert, Oriented  · Eyes: Pupils equal, round and reactive to light  · Heart: regular rate and rhythm  · Lungs: No audible wheezing  · Abdomen: soft                    Ortho Exam   Cervical spine:  Negative bilateral spurlings  5/5 C2-T1 strength    Hands:  Positive right > left phallens  TTP over carpal tunnel  Negative tinnels of carpal tunnel and guyons cannal  Right > left thenar atrophy    Diagnostics, reviewed and taken today if performed as documented:    None performed     Procedures, if performed today:    Procedures    None performed      Portions of the record may have been created with voice recognition software  Occasional wrong word or "sound a like" substitutions may have occurred due to the inherent limitations of voice recognition software  Read the chart carefully and recognize, using context, where substitutions have occurred

## 2019-10-31 DIAGNOSIS — I48.0 PAROXYSMAL ATRIAL FIBRILLATION (HCC): ICD-10-CM

## 2019-11-01 ENCOUNTER — OFFICE VISIT (OUTPATIENT)
Dept: OBGYN CLINIC | Facility: MEDICAL CENTER | Age: 74
End: 2019-11-01
Payer: MEDICARE

## 2019-11-01 VITALS
WEIGHT: 150 LBS | BODY MASS INDEX: 27.6 KG/M2 | DIASTOLIC BLOOD PRESSURE: 70 MMHG | HEIGHT: 62 IN | SYSTOLIC BLOOD PRESSURE: 114 MMHG | HEART RATE: 56 BPM

## 2019-11-01 DIAGNOSIS — G56.01 CARPAL TUNNEL SYNDROME ON RIGHT: Primary | ICD-10-CM

## 2019-11-01 PROCEDURE — 99213 OFFICE O/P EST LOW 20 MIN: CPT | Performed by: ORTHOPAEDIC SURGERY

## 2019-11-01 RX ORDER — CHLORHEXIDINE GLUCONATE 0.12 MG/ML
15 RINSE ORAL ONCE
Status: CANCELLED | OUTPATIENT
Start: 2019-11-01 | End: 2019-11-01

## 2019-11-01 NOTE — H&P (VIEW-ONLY)
Assessment:   Diagnosis ICD-10-CM Associated Orders   1  Carpal tunnel syndrome on right G56 01        Plan:    Patient does have carpal tunnel right wrist and is interested in having carpal tunnel release  She has numnbess and tingling thumb, index, long finger, positive tinel's and phalan's with thenar wasting in salas aspect  Night splints, medications have offered no relief  Risks nerve damage, stroke, infection, post op stiffness, pain and residual nerve pain  Consent signed  To do next visit:  Return for Post op  The above stated was discussed in layman's terms and the patient expressed understanding  All questions were answered to the patient's satisfaction  Scribe Attestation    I,:   Charlette Lamb am acting as a scribe while in the presence of the attending physician :        I,:   Beatriz Naqvi MD personally performed the services described in this documentation    as scribed in my presence :              Subjective:   Jasper Laurent is a 75 y o  female who presents for f/u right wrist/hand  Numbness and tingling in thumb, index and longer finger  This has been symptomatic past 5 months  Worse with hands up in reading, driving position  Pain wakes from sleep, she has to shake hands to alleviate  She does notice wasting of muscle in salas aspect of hand  She is wearing night splints which help some  She will at times have trouble with fine manipulation, dropping items         Review of systems negative unless otherwise specified in HPI    Past Medical History:   Diagnosis Date    Actinic keratosis     last assessed - 06Jun2014    Arthritis     Atrial fibrillation with rapid ventricular response (HCC)     last assessed - 26Apr2016    Basal cell carcinoma     Benign colon polyp     last assessed - 27Apr2015    Effusion of knee joint right     Resolved - 19Apr2016    Esophageal reflux     Fibromyalgia     last assessed - [id number]    Fibromyalgia, primary     Hypertension     Palpitations     last assessed - [id number]    Peroneal tendonitis, right     last assessed - [id number]    Rectal hemorrhage     last assessed - 10Gri9812    Trochanteric bursitis of left hip 3/9/2018    Trochanteric bursitis, unspecified hip     last assessed - [id number]       Past Surgical History:   Procedure Laterality Date    CATARACT EXTRACTION      COLONOSCOPY  03/2018    EYE SURGERY      HYSTERECTOMY      JOINT REPLACEMENT      RECTAL POLYPECTOMY      REPLACEMENT TOTAL KNEE Left     last assessed - [id number]    TONSILLECTOMY      TOTAL ABDOMINAL HYSTERECTOMY      TUBAL LIGATION         Family History   Problem Relation Age of Onset    Heart disease Mother     Diabetes Mother     Heart disease Father     Coronary artery disease Father     Stroke Father         cerebrovascular accident    Heart attack Father         myocardial infarction    Sudden death Father         scd    Other Family         Back disorder    Coronary artery disease Family     Neuropathy Family     Osteoporosis Family     No Known Problems Daughter     No Known Problems Maternal Grandmother     No Known Problems Maternal Grandfather     No Known Problems Paternal Grandmother     No Known Problems Paternal Grandfather     Cancer Maternal Uncle     Breast cancer Maternal Aunt 72    No Known Problems Son     No Known Problems Maternal Aunt     No Known Problems Maternal Aunt     No Known Problems Maternal Aunt     No Known Problems Paternal Aunt     No Known Problems Paternal Aunt     Anuerysm Neg Hx     Clotting disorder Neg Hx     Arrhythmia Neg Hx     Heart failure Neg Hx        Social History     Occupational History    Not on file   Tobacco Use    Smoking status: Never Smoker    Smokeless tobacco: Never Used   Substance and Sexual Activity    Alcohol use: Not Currently     Comment: occosional - every 3 months    Drug use: Never    Sexual activity: Not Currently         Current Outpatient Medications:     amLODIPine (NORVASC) 5 mg tablet, Take 1 tablet (5 mg total) by mouth daily, Disp: 90 tablet, Rfl: 3    apixaban (ELIQUIS) 5 mg, Take 1 tablet (5 mg total) by mouth 2 (two) times a day, Disp: 56 tablet, Rfl: 0    ascorbic acid (VITAMIN C) 500 mg tablet, Take 500 mg by mouth daily , Disp: , Rfl:     Cholecalciferol (CVS VITAMIN D) 2000 UNITS CAPS, Take 2,000 Units by mouth 2 (two) times a day  , Disp: , Rfl:     Chromium Picolinate (CHROMIUM PICOLATE PO), Take 1 tablet by mouth daily, Disp: , Rfl:     ezetimibe (ZETIA) 10 mg tablet, TAKE 1 TABLET BY MOUTH  DAILY, Disp: 90 tablet, Rfl: 3    famotidine (PEPCID) 20 mg tablet, Take 20 mg by mouth daily  , Disp: , Rfl:     gabapentin (NEURONTIN) 300 mg capsule, Take 2 capsules at bedtime  , Disp: 180 capsule, Rfl: 0    losartan (COZAAR) 100 MG tablet, Take 1 tablet (100 mg total) by mouth daily for 90 days Losartan Potassium 100 MG Oral Tablet take 1 tablet by mouth once daily  Quantity: 90;  Refills: 3    Asha PATRICIA MD;  Started 16-Oct-2011 Active, Disp: 90 tablet, Rfl: 3    rOPINIRole (REQUIP) 0 25 mg tablet, TAKE 1 TABLET BY MOUTH  DAILY AT BEDTIME, Disp: 90 tablet, Rfl: 3    sotalol (BETAPACE) 120 mg tablet, Take 1 tablet (120 mg total) by mouth every 12 (twelve) hours, Disp: 180 tablet, Rfl: 3    traMADol (ULTRAM) 50 mg tablet, Take 1 tablet (50 mg total) by mouth 2 (two) times a day as needed for moderate pain, Disp: 30 tablet, Rfl: 1    TURMERIC PO, Take 1 capsule by mouth 2 (two) times a day, Disp: , Rfl:     zolpidem (AMBIEN) 10 mg tablet, TAKE 1 TABLET BY MOUTH  DAILY AT BEDTIME AS NEEDED  FOR SLEEP, Disp: 90 tablet, Rfl: 1    Allergies   Allergen Reactions    Penicillins Other (See Comments)     As a child calcium deposit in the arm     Ace Inhibitors GI Intolerance     Did feel well on it            Vitals:    11/01/19 1256   BP: 114/70   Pulse: 56       Objective:                    Right Hand Exam     Comments:  TTP over carpal tunnel(median nerve)  She is able to make composite fist  4/5  strength right hand  Positive tinel's, Phalen's   Full motion of wrist  Thenar wasting salas aspect  Decreased sensation thumb, index, long finger               Diagnostics, reviewed and taken today if performed as documented:    None performed          Procedures, if performed today:    Procedures    None performed      Portions of the record may have been created with voice recognition software  Occasional wrong word or "sound a like" substitutions may have occurred due to the inherent limitations of voice recognition software  Read the chart carefully and recognize, using context, where substitutions have occurred

## 2019-11-01 NOTE — PROGRESS NOTES
Assessment:   Diagnosis ICD-10-CM Associated Orders   1  Carpal tunnel syndrome on right G56 01        Plan:    Patient does have carpal tunnel right wrist and is interested in having carpal tunnel release  She has numnbess and tingling thumb, index, long finger, positive tinel's and phalan's with thenar wasting in salas aspect  Night splints, medications have offered no relief  Risks nerve damage, stroke, infection, post op stiffness, pain and residual nerve pain  Consent signed  To do next visit:  Return for Post op  The above stated was discussed in layman's terms and the patient expressed understanding  All questions were answered to the patient's satisfaction  Scribe Attestation    I,:   Mikki Broderick am acting as a scribe while in the presence of the attending physician :        I,:   Hemalatha Levi MD personally performed the services described in this documentation    as scribed in my presence :              Subjective:   Cristofer Templeton is a 75 y o  female who presents for f/u right wrist/hand  Numbness and tingling in thumb, index and longer finger  This has been symptomatic past 5 months  Worse with hands up in reading, driving position  Pain wakes from sleep, she has to shake hands to alleviate  She does notice wasting of muscle in salas aspect of hand  She is wearing night splints which help some  She will at times have trouble with fine manipulation, dropping items         Review of systems negative unless otherwise specified in HPI    Past Medical History:   Diagnosis Date    Actinic keratosis     last assessed - 06Jun2014    Arthritis     Atrial fibrillation with rapid ventricular response (HCC)     last assessed - 26Apr2016    Basal cell carcinoma     Benign colon polyp     last assessed - 27Apr2015    Effusion of knee joint right     Resolved - 19Apr2016    Esophageal reflux     Fibromyalgia     last assessed - [id number]    Fibromyalgia, primary     Hypertension     Palpitations     last assessed - [id number]    Peroneal tendonitis, right     last assessed - [id number]    Rectal hemorrhage     last assessed - [id number]    Trochanteric bursitis of left hip 3/9/2018    Trochanteric bursitis, unspecified hip     last assessed - [id number]       Past Surgical History:   Procedure Laterality Date    CATARACT EXTRACTION      COLONOSCOPY  03/2018    EYE SURGERY      HYSTERECTOMY      JOINT REPLACEMENT      RECTAL POLYPECTOMY      REPLACEMENT TOTAL KNEE Left     last assessed - [id number]    TONSILLECTOMY      TOTAL ABDOMINAL HYSTERECTOMY      TUBAL LIGATION         Family History   Problem Relation Age of Onset    Heart disease Mother     Diabetes Mother     Heart disease Father     Coronary artery disease Father     Stroke Father         cerebrovascular accident    Heart attack Father         myocardial infarction    Sudden death Father         scd    Other Family         Back disorder    Coronary artery disease Family     Neuropathy Family     Osteoporosis Family     No Known Problems Daughter     No Known Problems Maternal Grandmother     No Known Problems Maternal Grandfather     No Known Problems Paternal Grandmother     No Known Problems Paternal Grandfather     Cancer Maternal Uncle     Breast cancer Maternal Aunt 72    No Known Problems Son     No Known Problems Maternal Aunt     No Known Problems Maternal Aunt     No Known Problems Maternal Aunt     No Known Problems Paternal Aunt     No Known Problems Paternal Aunt     Anuerysm Neg Hx     Clotting disorder Neg Hx     Arrhythmia Neg Hx     Heart failure Neg Hx        Social History     Occupational History    Not on file   Tobacco Use    Smoking status: Never Smoker    Smokeless tobacco: Never Used   Substance and Sexual Activity    Alcohol use: Not Currently     Comment: occosional - every 3 months    Drug use: Never    Sexual activity: Not Currently         Current Outpatient Medications:     amLODIPine (NORVASC) 5 mg tablet, Take 1 tablet (5 mg total) by mouth daily, Disp: 90 tablet, Rfl: 3    apixaban (ELIQUIS) 5 mg, Take 1 tablet (5 mg total) by mouth 2 (two) times a day, Disp: 56 tablet, Rfl: 0    ascorbic acid (VITAMIN C) 500 mg tablet, Take 500 mg by mouth daily , Disp: , Rfl:     Cholecalciferol (CVS VITAMIN D) 2000 UNITS CAPS, Take 2,000 Units by mouth 2 (two) times a day  , Disp: , Rfl:     Chromium Picolinate (CHROMIUM PICOLATE PO), Take 1 tablet by mouth daily, Disp: , Rfl:     ezetimibe (ZETIA) 10 mg tablet, TAKE 1 TABLET BY MOUTH  DAILY, Disp: 90 tablet, Rfl: 3    famotidine (PEPCID) 20 mg tablet, Take 20 mg by mouth daily  , Disp: , Rfl:     gabapentin (NEURONTIN) 300 mg capsule, Take 2 capsules at bedtime  , Disp: 180 capsule, Rfl: 0    losartan (COZAAR) 100 MG tablet, Take 1 tablet (100 mg total) by mouth daily for 90 days Losartan Potassium 100 MG Oral Tablet take 1 tablet by mouth once daily  Quantity: 90;  Refills: 3    Ashley PATRICIA MD;  Started 16-Oct-2011 Active, Disp: 90 tablet, Rfl: 3    rOPINIRole (REQUIP) 0 25 mg tablet, TAKE 1 TABLET BY MOUTH  DAILY AT BEDTIME, Disp: 90 tablet, Rfl: 3    sotalol (BETAPACE) 120 mg tablet, Take 1 tablet (120 mg total) by mouth every 12 (twelve) hours, Disp: 180 tablet, Rfl: 3    traMADol (ULTRAM) 50 mg tablet, Take 1 tablet (50 mg total) by mouth 2 (two) times a day as needed for moderate pain, Disp: 30 tablet, Rfl: 1    TURMERIC PO, Take 1 capsule by mouth 2 (two) times a day, Disp: , Rfl:     zolpidem (AMBIEN) 10 mg tablet, TAKE 1 TABLET BY MOUTH  DAILY AT BEDTIME AS NEEDED  FOR SLEEP, Disp: 90 tablet, Rfl: 1    Allergies   Allergen Reactions    Penicillins Other (See Comments)     As a child calcium deposit in the arm     Ace Inhibitors GI Intolerance     Did feel well on it            Vitals:    11/01/19 1256   BP: 114/70   Pulse: 56       Objective:                    Right Hand Exam     Comments:  TTP over carpal tunnel(median nerve)  She is able to make composite fist  4/5  strength right hand  Positive tinel's, Phalen's   Full motion of wrist  Thenar wasting salas aspect  Decreased sensation thumb, index, long finger               Diagnostics, reviewed and taken today if performed as documented:    None performed          Procedures, if performed today:    Procedures    None performed      Portions of the record may have been created with voice recognition software  Occasional wrong word or "sound a like" substitutions may have occurred due to the inherent limitations of voice recognition software  Read the chart carefully and recognize, using context, where substitutions have occurred

## 2019-11-05 ENCOUNTER — TELEPHONE (OUTPATIENT)
Dept: CARDIOLOGY CLINIC | Facility: CLINIC | Age: 74
End: 2019-11-05

## 2019-11-05 NOTE — TELEPHONE ENCOUNTER
Patient  Sees Kim Pore  She is having carpal tunnel surgery and they would like her to hold her Eliquis 2-3 days prior to the surgery  She is asking if this would be okay

## 2019-11-06 ENCOUNTER — TELEPHONE (OUTPATIENT)
Dept: CARDIOLOGY CLINIC | Facility: CLINIC | Age: 74
End: 2019-11-06

## 2019-11-06 NOTE — TELEPHONE ENCOUNTER
Left message for patient to let her know per Dr Salma Gilliland she can hold her Eliquis for 2 days prior to surgery

## 2019-11-06 NOTE — TELEPHONE ENCOUNTER
Jaylan Lema never seen her before? Don't see recent ablation in chart? Was it at [facility]?

## 2019-11-13 ENCOUNTER — TELEPHONE (OUTPATIENT)
Dept: OBGYN CLINIC | Facility: HOSPITAL | Age: 74
End: 2019-11-13

## 2019-11-13 NOTE — TELEPHONE ENCOUNTER
Spoke with patient, patient states she doesn't have the flu, she just has a stuffy nose and cough  Reached out to PATs to have a nurse call her back for f/u questions and advice  Advised patient if she doesn't hear from anyone within two hours to call me back

## 2019-11-13 NOTE — TELEPHONE ENCOUNTER
Patient is calling stating that she is supposed to have surgery with Dr Gege Dent tomorrow but she feels like she is starting to get a cold  She is wondering what she should do

## 2019-11-14 ENCOUNTER — HOSPITAL ENCOUNTER (OUTPATIENT)
Facility: HOSPITAL | Age: 74
Setting detail: OUTPATIENT SURGERY
Discharge: HOME | End: 2019-11-14
Attending: ORTHOPAEDIC SURGERY | Admitting: ORTHOPAEDIC SURGERY
Payer: MEDICARE

## 2019-11-14 VITALS
TEMPERATURE: 98.5 F | BODY MASS INDEX: 27.6 KG/M2 | HEART RATE: 54 BPM | HEIGHT: 62 IN | RESPIRATION RATE: 16 BRPM | DIASTOLIC BLOOD PRESSURE: 62 MMHG | WEIGHT: 150 LBS | OXYGEN SATURATION: 100 % | SYSTOLIC BLOOD PRESSURE: 138 MMHG

## 2019-11-14 DIAGNOSIS — G56.01 CARPAL TUNNEL SYNDROME ON RIGHT: Primary | ICD-10-CM

## 2019-11-14 PROCEDURE — 64721 CARPAL TUNNEL SURGERY: CPT | Performed by: ORTHOPAEDIC SURGERY

## 2019-11-14 RX ORDER — OXYCODONE HYDROCHLORIDE 5 MG/1
TABLET ORAL
Qty: 12 TABLET | Refills: 0 | Status: SHIPPED | OUTPATIENT
Start: 2019-11-14 | End: 2020-01-09 | Stop reason: ALTCHOICE

## 2019-11-14 RX ORDER — GINSENG 100 MG
CAPSULE ORAL AS NEEDED
Status: DISCONTINUED | OUTPATIENT
Start: 2019-11-14 | End: 2019-11-14 | Stop reason: HOSPADM

## 2019-11-14 RX ORDER — MAGNESIUM HYDROXIDE 1200 MG/15ML
LIQUID ORAL AS NEEDED
Status: DISCONTINUED | OUTPATIENT
Start: 2019-11-14 | End: 2019-11-14 | Stop reason: HOSPADM

## 2019-11-14 RX ORDER — CHLORHEXIDINE GLUCONATE 0.12 MG/ML
15 RINSE ORAL ONCE
Status: DISCONTINUED | OUTPATIENT
Start: 2019-11-14 | End: 2019-11-14

## 2019-11-14 NOTE — DISCHARGE INSTRUCTIONS
Discharge Instructions - Orthopedics  Sara Blair 75 y o  female MRN: [id number]  Unit/Bed#: [facility] 07    Weight Bearing Status:                                           Use of right hand for penmanship, and eating only    DVT prophylaxis:  Not applicable    Pain:  Continue analgesics as directed    Dressing Instructions:   Keep dressing clean, dry and intact until this weekend  The original postop dressing baby removed Saturday or Sunday, then large Band-Aids applied to the hand  Your allowed to take a shower,       but do not submerge the hand in sink water or bath water    PT/OT:  Not applicable    Appt Instructions: If you do not have your appointment, please call the clinic at [phone] to f/u with Dr Gege Dent in 1 week  Otherwise followup as scheduled     Contact the office sooner if you experience any increased numbness/tingling in the extremities        Miscellaneous:

## 2019-11-14 NOTE — OP NOTE
OPERATIVE REPORT  PATIENT NAME: Nuvia Grissom    :  1945  MRN: [id number]  Pt Location: [facility] OR ROOM 04    SURGERY DATE: 2019    Surgeon(s) and Role:     * Yani Oconnor MD - Primary     * Gennaro Abdalla PA-C - Assisting    Preop Diagnosis:  Carpal tunnel syndrome on right [G56 01]    Post-Op Diagnosis Codes:     * Carpal tunnel syndrome on right [G56 01]    Procedure(s) (LRB):  RELEASE CARPAL TUNNEL (Right)    Specimen(s):  * No specimens in log *    Estimated Blood Loss:   Minimal    Drains:  * No LDAs found *    Anesthesia Type:   Local    Operative Indications:  Carpal tunnel syndrome on right [G56 01]      Operative Findings:  Carpal tunnel release performed under direct visualization utilizing open technique    Complications:   None    Procedure and Technique: Following induction of adequate level of wide awake local anesthetic, the right upper extremity then prepped draped sterilely  An incision paralleling the thenar crease staying ulnar to this was opened up  Upon incising the skin, the palmar aponeurosis was sharply divided  With the [facility] elevator then between the median nerve the transverse carpal ligament, the transverse carpal ligament was stress section under direct visualization  Through this wound a limited volar forearm fasciotomy was carried out as well  Satisfied with the decompression, the wounds then flushed with saline closed  The skin was closed with interrupted nylon suture  Sterile dressings were applied  She was then transferred back to Ambulatory surgery with plans for follow-up in the office as an outpatient  Please note, that as no qualified with the resident was available to assist, the assistance of Momo Lemus was instrumental in the safe execution of this patient's surgery    Started the patient position, patient prep drape, intraoperative assistance, wound closure, dressing application, patient transfer, all these were performed under my direct supervision   I was present for the entire procedure    Patient Disposition:  PACU     SIGNATURE: Marco Antonio Zamora MD  DATE: November 14, 2019  TIME: 7:59 AM

## 2019-11-14 NOTE — INTERVAL H&P NOTE
H&P reviewed  After examining the patient I find no changes in the patients condition since the H&P had been written  Vitals:    11/14/19 0601   BP: 156/73   Pulse: 56   Resp: 16   Temp: 98 2 °F (36 8 °C)   SpO2: 99%     Heart sounds are present - no murmur   breath sounds are present- no wheeze     assessment:  Right carpal tunnel syndrome   plan:   To OR for right carpal tunnel release under local anesthetic

## 2019-11-15 DIAGNOSIS — M47.816 LUMBAR SPONDYLOSIS: ICD-10-CM

## 2019-11-15 DIAGNOSIS — M79.7 FIBROMYALGIA: ICD-10-CM

## 2019-11-15 RX ORDER — GABAPENTIN 300 MG/1
CAPSULE ORAL
Qty: 180 CAPSULE | Refills: 0 | OUTPATIENT
Start: 2019-11-15

## 2019-11-22 ENCOUNTER — OFFICE VISIT (OUTPATIENT)
Dept: OBGYN CLINIC | Facility: MEDICAL CENTER | Age: 74
End: 2019-11-22

## 2019-11-22 VITALS
DIASTOLIC BLOOD PRESSURE: 77 MMHG | SYSTOLIC BLOOD PRESSURE: 133 MMHG | WEIGHT: 158 LBS | BODY MASS INDEX: 29.08 KG/M2 | HEIGHT: 62 IN | HEART RATE: 66 BPM | RESPIRATION RATE: 20 BRPM

## 2019-11-22 DIAGNOSIS — Z47.89 SURGICAL AFTERCARE, MUSCULOSKELETAL SYSTEM: Primary | ICD-10-CM

## 2019-11-22 PROCEDURE — 99024 POSTOP FOLLOW-UP VISIT: CPT | Performed by: ORTHOPAEDIC SURGERY

## 2019-11-22 NOTE — PROGRESS NOTES
74 y o female 1 week s/p right open carpal tunnel release  She describes improvement in her numbness and tingling of the fingers  She does have some postoperative pain around the incision      Review of Systems  Review of systems negative unless otherwise specified in HPI    Past Medical History  Past Medical History:   Diagnosis Date    Actinic keratosis     last assessed - [id number]    Arthritis     Atrial fibrillation with rapid ventricular response (HCC)     last assessed - 07Iob1398    Basal cell carcinoma     Benign colon polyp     last assessed - 75Gsg0825    Effusion of knee joint right     Resolved - [id number]    Esophageal reflux     Fibromyalgia     last assessed - 72Yll8967    Fibromyalgia, primary     Hypertension     Palpitations     last assessed - 01Otk3003    Peroneal tendonitis, right     last assessed - [id number]    Rectal hemorrhage     last assessed - [id number]    Trochanteric bursitis of left hip 3/9/2018    Trochanteric bursitis, unspecified hip     last assessed - [id number]       Past Surgical History  Past Surgical History:   Procedure Laterality Date    CATARACT EXTRACTION Bilateral     COLONOSCOPY  03/2018    EYE SURGERY      HYSTERECTOMY      JOINT REPLACEMENT Left     knee    MS REVISE MEDIAN N/CARPAL TUNNEL SURG Right 11/14/2019    Procedure: RELEASE CARPAL TUNNEL;  Surgeon: Amadou Rosas MD;  Location: [facility];  Service: Orthopedics    RECTAL POLYPECTOMY      REPLACEMENT TOTAL KNEE Left     last assessed - [id number]    TONSILLECTOMY      TOTAL ABDOMINAL HYSTERECTOMY      TUBAL LIGATION         Current Medications  Current Outpatient Medications on File Prior to Visit   Medication Sig Dispense Refill    amLODIPine (NORVASC) 5 mg tablet Take 1 tablet (5 mg total) by mouth daily 90 tablet 3    apixaban (ELIQUIS) 5 mg Take 1 tablet (5 mg total) by mouth 2 (two) times a day 56 tablet 0    ascorbic acid (VITAMIN C) 500 mg tablet Take 500 mg by mouth daily  Cholecalciferol (CVS VITAMIN D) 2000 UNITS CAPS Take 2,000 Units by mouth 2 (two) times a day        Chromium Picolinate (CHROMIUM PICOLATE PO) Take 1 tablet by mouth daily      ezetimibe (ZETIA) 10 mg tablet TAKE 1 TABLET BY MOUTH  DAILY 90 tablet 3    famotidine (PEPCID) 20 mg tablet Take 20 mg by mouth daily        gabapentin (NEURONTIN) 300 mg capsule Take 2 capsules at bedtime  180 capsule 0    losartan (COZAAR) 100 MG tablet Take 1 tablet (100 mg total) by mouth daily for 90 days Losartan Potassium 100 MG Oral Tablet take 1 tablet by mouth once daily  Quantity: 90;  Refills: 3    Cristine PATRICIA MD;  Bettie Seek 16-Oct-2011 Active 90 tablet 3    rOPINIRole (REQUIP) 0 25 mg tablet TAKE 1 TABLET BY MOUTH  DAILY AT BEDTIME 90 tablet 3    sotalol (BETAPACE) 120 mg tablet Take 1 tablet (120 mg total) by mouth every 12 (twelve) hours 180 tablet 3    traMADol (ULTRAM) 50 mg tablet Take 1 tablet (50 mg total) by mouth 2 (two) times a day as needed for moderate pain 30 tablet 1    TURMERIC PO Take 1 capsule by mouth 2 (two) times a day      zolpidem (AMBIEN) 10 mg tablet TAKE 1 TABLET BY MOUTH  DAILY AT BEDTIME AS NEEDED  FOR SLEEP 90 tablet 1    oxyCODONE (ROXICODONE) 5 mg immediate release tablet 1 pill po Q6 Hrs prn (Patient not taking: Reported on 11/22/2019) 12 tablet 0     No current facility-administered medications on file prior to visit          Recent Labs [facility])  0   Lab Value Date/Time    HCT 43 5 09/16/2018 0501    HCT 39 3 05/06/2015 1116    HGB 14 3 09/16/2018 0501    HGB 13 3 05/06/2015 1116    WBC 6 87 09/16/2018 0501    WBC 6 63 05/06/2015 1116    INR 1 15 09/11/2018 1011    ESR 25 (H) 04/05/2019 0930    CRP 9 2 (H) 04/05/2019 0930    GLUCOSE 114 05/06/2015 1116    HGBA1C 6 5 (H) 09/11/2018 1011         Physical exam  · General: Awake, Alert, Oriented  · Eyes: Pupils equal, round and reactive to light  · Heart: regular rate and rhythm  · Lungs: No audible wheezing  · Abdomen: soft  Right hand  Incision is healing appropriately  Sensation intact r/m/u  Motor intact AIN/PIN/ulnar  Fingers are WWP    Imaging  none    Procedure  Sutures removed today    Assessment/Plan:   75 y  o female s/p right open carpal tunnel release  She is doing well  Plan is for follow up in 4-6 weeks for repeat evaluation

## 2019-11-25 ENCOUNTER — TRANSCRIBE ORDERS (OUTPATIENT)
Dept: LAB | Facility: CLINIC | Age: 74
End: 2019-11-25

## 2019-11-25 ENCOUNTER — APPOINTMENT (OUTPATIENT)
Dept: LAB | Facility: CLINIC | Age: 74
End: 2019-11-25
Payer: MEDICARE

## 2019-11-25 DIAGNOSIS — N17.9 ACUTE RENAL FAILURE, UNSPECIFIED ACUTE RENAL FAILURE TYPE (HCC): ICD-10-CM

## 2019-11-25 DIAGNOSIS — I48.0 PAF (PAROXYSMAL ATRIAL FIBRILLATION) (HCC): ICD-10-CM

## 2019-11-25 DIAGNOSIS — E78.5 DYSLIPIDEMIA: ICD-10-CM

## 2019-11-25 DIAGNOSIS — I10 ESSENTIAL HYPERTENSION: ICD-10-CM

## 2019-11-25 DIAGNOSIS — R60.1 GENERALIZED EDEMA: ICD-10-CM

## 2019-11-25 DIAGNOSIS — N17.9 ACUTE RENAL FAILURE, UNSPECIFIED ACUTE RENAL FAILURE TYPE (HCC): Primary | ICD-10-CM

## 2019-11-25 LAB
ALBUMIN SERPL BCP-MCNC: 4.2 G/DL (ref 3.5–5)
ALP SERPL-CCNC: 59 U/L (ref 46–116)
ALT SERPL W P-5'-P-CCNC: 29 U/L (ref 12–78)
ANION GAP SERPL CALCULATED.3IONS-SCNC: 9 MMOL/L (ref 4–13)
AST SERPL W P-5'-P-CCNC: 16 U/L (ref 5–45)
BASOPHILS # BLD AUTO: 0.06 THOUSANDS/ΜL (ref 0–0.1)
BASOPHILS NFR BLD AUTO: 1 % (ref 0–1)
BILIRUB SERPL-MCNC: 0.96 MG/DL (ref 0.2–1)
BUN SERPL-MCNC: 21 MG/DL (ref 5–25)
CALCIUM SERPL-MCNC: 9.6 MG/DL (ref 8.3–10.1)
CHLORIDE SERPL-SCNC: 107 MMOL/L (ref 100–108)
CHOLEST SERPL-MCNC: 184 MG/DL (ref 50–200)
CO2 SERPL-SCNC: 26 MMOL/L (ref 21–32)
CREAT SERPL-MCNC: 0.92 MG/DL (ref 0.6–1.3)
EOSINOPHIL # BLD AUTO: 0.23 THOUSAND/ΜL (ref 0–0.61)
EOSINOPHIL NFR BLD AUTO: 4 % (ref 0–6)
ERYTHROCYTE [DISTWIDTH] IN BLOOD BY AUTOMATED COUNT: 13.1 % (ref 11.6–15.1)
GFR SERPL CREATININE-BSD FRML MDRD: 61 ML/MIN/1.73SQ M
GLUCOSE P FAST SERPL-MCNC: 93 MG/DL (ref 65–99)
HCT VFR BLD AUTO: 40 % (ref 34.8–46.1)
HDLC SERPL-MCNC: 57 MG/DL
HGB BLD-MCNC: 12.7 G/DL (ref 11.5–15.4)
IMM GRANULOCYTES # BLD AUTO: 0.01 THOUSAND/UL (ref 0–0.2)
IMM GRANULOCYTES NFR BLD AUTO: 0 % (ref 0–2)
LDLC SERPL CALC-MCNC: 106 MG/DL (ref 0–100)
LYMPHOCYTES # BLD AUTO: 1.98 THOUSANDS/ΜL (ref 0.6–4.47)
LYMPHOCYTES NFR BLD AUTO: 35 % (ref 14–44)
MCH RBC QN AUTO: 30.5 PG (ref 26.8–34.3)
MCHC RBC AUTO-ENTMCNC: 31.8 G/DL (ref 31.4–37.4)
MCV RBC AUTO: 96 FL (ref 82–98)
MONOCYTES # BLD AUTO: 0.58 THOUSAND/ΜL (ref 0.17–1.22)
MONOCYTES NFR BLD AUTO: 10 % (ref 4–12)
NEUTROPHILS # BLD AUTO: 2.88 THOUSANDS/ΜL (ref 1.85–7.62)
NEUTS SEG NFR BLD AUTO: 50 % (ref 43–75)
NRBC BLD AUTO-RTO: 0 /100 WBCS
PLATELET # BLD AUTO: 276 THOUSANDS/UL (ref 149–390)
PMV BLD AUTO: 10.6 FL (ref 8.9–12.7)
POTASSIUM SERPL-SCNC: 4.3 MMOL/L (ref 3.5–5.3)
PROT SERPL-MCNC: 7.5 G/DL (ref 6.4–8.2)
RBC # BLD AUTO: 4.17 MILLION/UL (ref 3.81–5.12)
SODIUM SERPL-SCNC: 142 MMOL/L (ref 136–145)
TRIGL SERPL-MCNC: 106 MG/DL
WBC # BLD AUTO: 5.74 THOUSAND/UL (ref 4.31–10.16)

## 2019-11-25 PROCEDURE — 80061 LIPID PANEL: CPT

## 2019-11-25 PROCEDURE — 80053 COMPREHEN METABOLIC PANEL: CPT

## 2019-11-25 PROCEDURE — 85025 COMPLETE CBC W/AUTO DIFF WBC: CPT

## 2019-11-25 PROCEDURE — 36415 COLL VENOUS BLD VENIPUNCTURE: CPT

## 2019-12-02 DIAGNOSIS — M17.11 PRIMARY OSTEOARTHRITIS OF RIGHT KNEE: ICD-10-CM

## 2019-12-02 RX ORDER — TRAMADOL HYDROCHLORIDE 50 MG/1
TABLET ORAL
Qty: 30 TABLET | Refills: 2 | Status: SHIPPED | OUTPATIENT
Start: 2019-12-02 | End: 2020-02-17 | Stop reason: SDUPTHER

## 2019-12-20 ENCOUNTER — OFFICE VISIT (OUTPATIENT)
Dept: CARDIOLOGY CLINIC | Facility: CLINIC | Age: 74
End: 2019-12-20
Payer: MEDICARE

## 2019-12-20 ENCOUNTER — OFFICE VISIT (OUTPATIENT)
Dept: OBGYN CLINIC | Facility: MEDICAL CENTER | Age: 74
End: 2019-12-20

## 2019-12-20 VITALS
WEIGHT: 159.4 LBS | DIASTOLIC BLOOD PRESSURE: 74 MMHG | SYSTOLIC BLOOD PRESSURE: 178 MMHG | HEART RATE: 57 BPM | HEIGHT: 62 IN | BODY MASS INDEX: 29.33 KG/M2

## 2019-12-20 VITALS
SYSTOLIC BLOOD PRESSURE: 146 MMHG | DIASTOLIC BLOOD PRESSURE: 78 MMHG | BODY MASS INDEX: 29.19 KG/M2 | HEART RATE: 50 BPM | HEIGHT: 62 IN | WEIGHT: 158.6 LBS

## 2019-12-20 DIAGNOSIS — I10 ESSENTIAL HYPERTENSION: ICD-10-CM

## 2019-12-20 DIAGNOSIS — Z47.89 SURGICAL AFTERCARE, MUSCULOSKELETAL SYSTEM: Primary | ICD-10-CM

## 2019-12-20 DIAGNOSIS — I48.0 PAROXYSMAL ATRIAL FIBRILLATION (HCC): Primary | ICD-10-CM

## 2019-12-20 DIAGNOSIS — E78.2 MIXED HYPERLIPIDEMIA: ICD-10-CM

## 2019-12-20 DIAGNOSIS — I48.0 PAF (PAROXYSMAL ATRIAL FIBRILLATION) (HCC): ICD-10-CM

## 2019-12-20 PROCEDURE — 93000 ELECTROCARDIOGRAM COMPLETE: CPT | Performed by: INTERNAL MEDICINE

## 2019-12-20 PROCEDURE — 99214 OFFICE O/P EST MOD 30 MIN: CPT | Performed by: INTERNAL MEDICINE

## 2019-12-20 PROCEDURE — 99024 POSTOP FOLLOW-UP VISIT: CPT | Performed by: ORTHOPAEDIC SURGERY

## 2019-12-20 NOTE — PROGRESS NOTES
74 y o female who presents 5 weeks sp R carpal tunnel release  Overall patient is doing well she notes significant improvement in her discomfort to the radial 3 digits pre op    At this time she has some moderate discomfort at the incision site due to hypersensitivity that can be aggravated by grasping objects at that portion of the palm/wrist       Review of Systems  Review of systems negative unless otherwise specified in HPI    Past Medical History  Past Medical History:   Diagnosis Date    Actinic keratosis     last assessed - 06Jun2014    Arthritis     Atrial fibrillation with rapid ventricular response ([address] )     last assessed - 26Apr2016    Basal cell carcinoma     Benign colon polyp     last assessed - [id number]    Effusion of knee joint right     Resolved - [id number]    Esophageal reflux     Fibromyalgia     last assessed - [id number]    Fibromyalgia, primary     Hypertension     Palpitations     last assessed - 14Hba3463    Peroneal tendonitis, right     last assessed - 02Oct2013    Rectal hemorrhage     last assessed - [id number]    Trochanteric bursitis of left hip 3/9/2018    Trochanteric bursitis, unspecified hip     last assessed - [id number]       Past Surgical History  Past Surgical History:   Procedure Laterality Date    CATARACT EXTRACTION Bilateral     COLONOSCOPY  03/2018    EYE SURGERY      HYSTERECTOMY      JOINT REPLACEMENT Left     knee    OR REVISE MEDIAN N/CARPAL TUNNEL SURG Right 11/14/2019    Procedure: RELEASE CARPAL TUNNEL;  Surgeon: Vena Blizzard, MD;  [facility];  Service: Orthopedics    RECTAL POLYPECTOMY      REPLACEMENT TOTAL KNEE Left     last assessed - 27Apr2015    TONSILLECTOMY      TOTAL ABDOMINAL HYSTERECTOMY      TUBAL LIGATION         Current Medications  Current Outpatient Medications on File Prior to Visit   Medication Sig Dispense Refill    amLODIPine (NORVASC) 5 mg tablet Take 1 tablet (5 mg total) by mouth daily 90 tablet 3    apixaban (ELIQUIS) 5 mg Take 1 tablet (5 mg total) by mouth 2 (two) times a day 56 tablet 0    ascorbic acid (VITAMIN C) 500 mg tablet Take 500 mg by mouth daily       Cholecalciferol ([employer] VITAMIN D) 2000 UNITS CAPS Take 2,000 Units by mouth 2 (two) times a day        Chromium Picolinate (CHROMIUM PICOLATE PO) Take 1 tablet by mouth daily      ezetimibe (ZETIA) 10 mg tablet TAKE 1 TABLET BY MOUTH  DAILY 90 tablet 3    famotidine (PEPCID) 20 mg tablet Take 20 mg by mouth daily        gabapentin (NEURONTIN) 300 mg capsule Take 2 capsules at bedtime  180 capsule 0    losartan (COZAAR) 100 MG tablet Take 1 tablet (100 mg total) by mouth daily for 90 days Losartan Potassium 100 MG Oral Tablet take 1 tablet by mouth once daily  Quantity: 90;  Refills: 3    Citlali PATRICIA MD;  Thu Juárez 16-Oct-2011 Active 90 tablet 3    oxyCODONE (ROXICODONE) 5 mg immediate release tablet 1 pill po Q6 Hrs prn 12 tablet 0    rOPINIRole (REQUIP) 0 25 mg tablet TAKE 1 TABLET BY MOUTH  DAILY AT BEDTIME 90 tablet 3    sotalol (BETAPACE) 120 mg tablet Take 1 tablet (120 mg total) by mouth every 12 (twelve) hours 180 tablet 3    traMADol (ULTRAM) 50 mg tablet TAKE 1 TABLET BY MOUTH  TWICE A DAY AS NEEDED FOR  MODERATE PAIN 30 tablet 2    TURMERIC PO Take 1 capsule by mouth 2 (two) times a day      zolpidem (AMBIEN) 10 mg tablet TAKE 1 TABLET BY MOUTH  DAILY AT BEDTIME AS NEEDED  FOR SLEEP 90 tablet 1     No current facility-administered medications on file prior to visit          Recent Labs [facility])  0   Lab Value Date/Time    HCT 40 0 11/25/2019 0801    HCT 39 3 05/06/2015 1116    HGB 12 7 11/25/2019 0801    HGB 13 3 05/06/2015 1116    WBC 5 74 11/25/2019 0801    WBC 6 63 05/06/2015 1116    INR 1 15 09/11/2018 1011    ESR 25 (H) 04/05/2019 0930    CRP 9 2 (H) 04/05/2019 0930    GLUCOSE 114 05/06/2015 1116    HGBA1C 6 5 (H) 09/11/2018 1011         Physical exam  · General: Awake, Alert, Oriented  · Eyes: Pupils equal, round and reactive to light  · Heart: regular rate and intact distal pulses   · Lungs: No audible wheezing  · Abdomen: soft  R hand/wrist  Skin with well healed surgical incision with underlying scar tissue present   Mildly TTP over the incision site  Patient with full active ROM of the digits of the hand  Sensation intact throughout the hand   Brisk capillary refill to the hand and digits    Assessment/Plan:   75 y  o female 5 weeks sp r CTR    WBAT RUE  Tylenol as needed for pain relief  Patient should continue to perform activities as tolerated   Patient may follow up PRN

## 2019-12-20 NOTE — PROGRESS NOTES
Cardiology Follow Up    Shira Fraser  1945  [id number]  [address]    1  Paroxysmal atrial fibrillation (HCC)  POCT ECG   2  PAF (paroxysmal atrial fibrillation) ([address] )     3  Essential hypertension     4  Mixed hyperlipidemia         Discussion/Summary:  She continues to do well since her last visit  Maintaining sinus rhythm on sotalol  Continue full anticoagulation no adverse bleeding events  Blood pressure overall has been well controlled  Lipids have been stable  Continue current medical regimen no change in courage diet and exercise follow-up in 8 month  Interval History:  Very 76 yo pleasant female with  paroxysmal atrial fibrillation, hypertension, hyperlipidemia presents for a follow-up visit  She continues to remain in sinus rhythm on sotalol  Heart rates run a little slow but overall she has been doing good  Functional capacity has been good for her age in there has been no lightheadedness or dizziness  She denies any chest pain, palpitations, lower extremity edema, PND, orthopnea  There has been no adverse bleeding events on anticoagulation    Problem List     Generalized edema    Essential hypertension    Dyslipidemia    Sacroiliitis (HCC)    Acute pain of right knee    Allergic rhinitis    Fibromyalgia    Hyperlipidemia    Insomnia    Lumbar spondylosis    Lumbar stenosis    Osteoarthrosis, hand    Osteoarthritis of knee    Overview Signed 10/4/2018  5:41 PM by Joe Sahni MD     Laterality: Right         Osteopenia    Paroxysmal atrial fibrillation (HCC)    Peripheral neuropathy    Restless legs syndrome    Right lumbar radiculopathy    TMJ syndrome    Vitamin D deficiency    Effusion of right knee        Past Medical History:   Diagnosis Date    Actinic keratosis     last assessed - [id number]    Arthritis     Atrial fibrillation with rapid ventricular response ([facility] )     last assessed - 89Qeo5220    Basal cell carcinoma     Benign colon polyp     last assessed - 37Icv2622    Effusion of knee joint right     Resolved - 19Adw3156    Esophageal reflux     Fibromyalgia     last assessed - 15Npt6383    Fibromyalgia, primary     Hypertension     Palpitations     last assessed - 01Bfr7433    Peroneal tendonitis, right     last assessed - 01GZE8058    Rectal hemorrhage     last assessed - 49QIL8637    Trochanteric bursitis of left hip 3/9/2018    Trochanteric bursitis, unspecified hip     last assessed - 45HUN3880     Social History     Socioeconomic History    Marital status: /Civil Union     Spouse name: Not on file    Number of children: Not on file    Years of education: Not on file    Highest education level: Not on file   Occupational History    Not on file   Social Needs    Financial resource strain: Not on file    Food insecurity:     Worry: Not on file     Inability: Not on file    Transportation needs:     Medical: Not on file     Non-medical: Not on file   Tobacco Use    Smoking status: Never Smoker    Smokeless tobacco: Never Used   Substance and Sexual Activity    Alcohol use: Not Currently     Comment: occosional - every 3 months    Drug use: Never    Sexual activity: Not Currently   Lifestyle    Physical activity:     Days per week: Not on file     Minutes per session: Not on file    Stress: Not on file   Relationships    Social connections:     Talks on phone: Not on file     Gets together: Not on file     Attends [employer] service: Not on file     Active member of club or organization: Not on file     Attends meetings of clubs or organizations: Not on file     Relationship status: Not on file    Intimate partner violence:     Fear of current or ex partner: Not on file     Emotionally abused: Not on file     Physically abused: Not on file     Forced sexual activity: Not on file   Other Topics Concern    Not on file   Social History Narrative    Not on file      Family History   Problem Relation Age of Onset    Heart disease Mother     Diabetes Mother     Heart disease Father     Coronary artery disease Father     Stroke Father         cerebrovascular accident    Heart attack Father         myocardial infarction    Sudden death Father         scd    Other Family         Back disorder    Coronary artery disease Family     Neuropathy Family     Osteoporosis Family     No Known Problems Daughter     No Known Problems Maternal Grandmother     No Known Problems Maternal Grandfather     No Known Problems Paternal Grandmother     No Known Problems Paternal Grandfather     Cancer Maternal Uncle     Breast cancer Maternal Aunt 72    No Known Problems Son     No Known Problems Maternal Aunt     No Known Problems Maternal Aunt     No Known Problems Maternal Aunt     No Known Problems Paternal Aunt     No Known Problems Paternal Aunt     Anuerysm Neg Hx     Clotting disorder Neg Hx     Arrhythmia Neg Hx     Heart failure Neg Hx      Past Surgical History:   Procedure Laterality Date    CATARACT EXTRACTION Bilateral     COLONOSCOPY  03/2018    EYE SURGERY      HYSTERECTOMY      JOINT REPLACEMENT Left     knee    WA REVISE MEDIAN N/CARPAL TUNNEL SURG Right 11/14/2019    Procedure: RELEASE CARPAL TUNNEL;  Surgeon: Hemalatha Levi MD;  [facility];  Service: Orthopedics    RECTAL POLYPECTOMY      REPLACEMENT TOTAL KNEE Left     last assessed - [id number]    TONSILLECTOMY      TOTAL ABDOMINAL HYSTERECTOMY      TUBAL LIGATION         Current Outpatient Medications:     amLODIPine (NORVASC) 5 mg tablet, Take 1 tablet (5 mg total) by mouth daily, Disp: 90 tablet, Rfl: 3    apixaban (ELIQUIS) 5 mg, Take 1 tablet (5 mg total) by mouth 2 (two) times a day, Disp: 56 tablet, Rfl: 0    ascorbic acid (VITAMIN C) 500 mg tablet, Take 500 mg by mouth daily , Disp: , Rfl:     Cholecalciferol ([employer]S VITAMIN D) 2000 UNITS CAPS, Take 2,000 Units by mouth 2 (two) times a day  , Disp: , Rfl:     Chromium Picolinate (CHROMIUM PICOLATE PO), Take 1 tablet by mouth daily, Disp: , Rfl:     ezetimibe (ZETIA) 10 mg tablet, TAKE 1 TABLET BY MOUTH  DAILY, Disp: 90 tablet, Rfl: 3    famotidine (PEPCID) 20 mg tablet, Take 20 mg by mouth daily  , Disp: , Rfl:     gabapentin (NEURONTIN) 300 mg capsule, Take 2 capsules at bedtime  , Disp: 180 capsule, Rfl: 0    losartan (COZAAR) 100 MG tablet, Take 1 tablet (100 mg total) by mouth daily for 90 days Losartan Potassium 100 MG Oral Tablet take 1 tablet by mouth once daily  Quantity: 90;  Refills: 3    Campbell PATRICIA MD;  Valdez Porter 16-Oct-2011 Active, Disp: 90 tablet, Rfl: 3    rOPINIRole (REQUIP) 0 25 mg tablet, TAKE 1 TABLET BY MOUTH  DAILY AT BEDTIME, Disp: 90 tablet, Rfl: 3    sotalol (BETAPACE) 120 mg tablet, Take 1 tablet (120 mg total) by mouth every 12 (twelve) hours, Disp: 180 tablet, Rfl: 3    traMADol (ULTRAM) 50 mg tablet, TAKE 1 TABLET BY MOUTH  TWICE A DAY AS NEEDED FOR  MODERATE PAIN, Disp: 30 tablet, Rfl: 2    TURMERIC PO, Take 1 capsule by mouth 2 (two) times a day, Disp: , Rfl:     zolpidem (AMBIEN) 10 mg tablet, TAKE 1 TABLET BY MOUTH  DAILY AT BEDTIME AS NEEDED  FOR SLEEP, Disp: 90 tablet, Rfl: 1    oxyCODONE (ROXICODONE) 5 mg immediate release tablet, 1 pill po Q6 Hrs prn (Patient not taking: Reported on 11/22/2019), Disp: 12 tablet, Rfl: 0  Allergies   Allergen Reactions    Penicillins Other (See Comments)     As a child calcium deposit in the arm     Ace Inhibitors GI Intolerance     Did feel well on it       Labs:     Chemistry        Component Value Date/Time     05/06/2015 1116    K 4 3 11/25/2019 0801    K 4 8 05/06/2015 1116     11/25/2019 0801    CL 99 (L) 05/06/2015 1116    CO2 26 11/25/2019 0801    CO2 28 05/06/2015 1116    BUN 21 11/25/2019 0801    BUN 19 05/06/2015 1116    CREATININE 0 92 11/25/2019 0801    CREATININE 0 97 05/06/2015 1116        Component Value Date/Time CALCIUM 9 6 11/25/2019 0801    CALCIUM 9 0 05/06/2015 1116    ALKPHOS 59 11/25/2019 0801    ALKPHOS 60 05/06/2015 1116    AST 16 11/25/2019 0801    AST 28 05/06/2015 1116    ALT 29 11/25/2019 0801    ALT 34 05/06/2015 1116    BILITOT 0 78 05/06/2015 1116            Lab Results   Component Value Date    CHOL 205 05/06/2015    CHOL 195 07/10/2014     Lab Results   Component Value Date    HDL 57 11/25/2019    HDL 59 09/16/2018    HDL 58 04/25/2018     Lab Results   Component Value Date    LDLCALC 106 (H) 11/25/2019    LDLCALC 121 (H) 09/16/2018    LDLCALC 90 04/25/2018     Lab Results   Component Value Date    TRIG 106 11/25/2019    TRIG 109 09/16/2018    TRIG 76 04/25/2018     No results found for: CHOLHDL    Imaging: No results found  ECG:    Sinus bradycardia nonspecific T-wave changes     Review of Systems   Constitution: Negative  HENT: Negative  Eyes: Negative  Cardiovascular: Negative  Negative for leg swelling  Respiratory: Negative  Endocrine: Negative  Hematologic/Lymphatic: Negative  Skin: Negative  Musculoskeletal: Negative  Gastrointestinal: Negative  Genitourinary: Negative  Neurological: Negative  Psychiatric/Behavioral: Negative  Vitals:    12/20/19 0937   BP: 146/78   Pulse: (!) 50     Vitals:    12/20/19 0937   Weight: 71 9 kg (158 lb 9 6 oz)     Height: 5' 2" (157 5 cm)   Body mass index is 29 01 kg/m²  Physical Exam:  Vital signs reviewed  General:  Alert and cooperative, appears stated age, no acute distress  HEENT:  PERRLA, EOMI, no scleral icterus, no conjunctival pallor  Neck:  No lymphadenopathy, no thyromegaly, no carotid bruits, no elevated JVP  Heart:  Regular rate and rhythm, normal S1/S2, no S3/S4, no murmur, rubs or gallops    PMI nondisplaced  Lungs:  Clear to auscultation bilaterally, no wheezes rales or rhonchi  Abdomen:  Soft, non-tender, positive bowel sounds, no rebound or guarding,   no organomegaly   Extremities:  Normal range of motion    No clubbing, cyanosis or edema   Vascular:  2+ pedal pulses  Skin:  No rashes or lesions on exposed skin  Neurologic:  Cranial nerves II-XII grossly intact without focal deficits

## 2020-01-06 DIAGNOSIS — I48.91 ATRIAL FIBRILLATION, RAPID (HCC): ICD-10-CM

## 2020-01-07 RX ORDER — SOTALOL HYDROCHLORIDE 120 MG/1
TABLET ORAL
Qty: 180 TABLET | Refills: 0 | Status: SHIPPED | OUTPATIENT
Start: 2020-01-07 | End: 2020-03-06 | Stop reason: ALTCHOICE

## 2020-01-09 ENCOUNTER — OFFICE VISIT (OUTPATIENT)
Dept: FAMILY MEDICINE CLINIC | Facility: CLINIC | Age: 75
End: 2020-01-09
Payer: MEDICARE

## 2020-01-09 VITALS
SYSTOLIC BLOOD PRESSURE: 138 MMHG | DIASTOLIC BLOOD PRESSURE: 88 MMHG | RESPIRATION RATE: 16 BRPM | HEART RATE: 68 BPM | BODY MASS INDEX: 28.89 KG/M2 | TEMPERATURE: 98.2 F | HEIGHT: 62 IN | WEIGHT: 157 LBS

## 2020-01-09 DIAGNOSIS — L57.0 AK (ACTINIC KERATOSIS): Primary | ICD-10-CM

## 2020-01-09 PROCEDURE — 17000 DESTRUCT PREMALG LESION: CPT | Performed by: FAMILY MEDICINE

## 2020-01-09 NOTE — PROGRESS NOTES
Non healing lesion on tip of nose x several months  /88 (BP Location: Left arm, Patient Position: Sitting, Cuff Size: Large)   Pulse 68   Temp 98 2 °F (36 8 °C)   Resp 16   Ht 5' 2" (1 575 m)   Wt 71 2 kg (157 lb)   LMP 02/01/1990 (Within Weeks)   BMI 28 72 kg/m²        BMI Counseling: Body mass index is 28 72 kg/m²  The BMI is above normal  Nutrition recommendations include consuming healthier snacks, moderation in carbohydrate intake, reducing intake of saturated fat and trans fat and reducing intake of cholesterol  PE suspected AK tip of nose  Lesion Destruction  Date/Time: 1/9/2020 4:25 PM  Performed by: Jaxon Rivera MD  Authorized by: Jaxon Rivera MD     Procedure Details - Lesion Destruction:     Number of Lesions:  1  Lesion 1:     Body area:  Head/neck    Head/neck location:  Nose    Initial size (mm):  2    Final defect size (mm):  2    Malignancy: pre-malignant lesion      Destruction method: cryotherapy    Lesion 6:      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

## 2020-01-10 DIAGNOSIS — M47.816 LUMBAR SPONDYLOSIS: ICD-10-CM

## 2020-01-10 DIAGNOSIS — I48.0 PAF (PAROXYSMAL ATRIAL FIBRILLATION) (HCC): Primary | ICD-10-CM

## 2020-01-10 DIAGNOSIS — M79.7 FIBROMYALGIA: ICD-10-CM

## 2020-01-10 RX ORDER — SOTALOL HYDROCHLORIDE 120 MG/1
120 TABLET ORAL EVERY 12 HOURS
Qty: 28 TABLET | Refills: 0 | Status: SHIPPED | OUTPATIENT
Start: 2020-01-10 | End: 2020-03-10 | Stop reason: SDUPTHER

## 2020-01-10 NOTE — TELEPHONE ENCOUNTER
Patient needs short term supply to local to hold her until mail order comes in from Cardiology  She states you told her that you would do this for her

## 2020-01-14 ENCOUNTER — TELEPHONE (OUTPATIENT)
Dept: CARDIOLOGY CLINIC | Facility: CLINIC | Age: 75
End: 2020-01-14

## 2020-01-14 NOTE — TELEPHONE ENCOUNTER
Pt reports when she last saw [facility] they discussed weaning off the gabapentin, which pt did but then pain returned so she restarted it  Pt needs RF sent to [facility] for a 90 day RX for 300 mg (2) at HS  Pt said it is working and she has no s/e from it  Looks like gabapentin was LP 9/27/19 for #180 with no RF by [facility]  No pending ov

## 2020-01-14 NOTE — TELEPHONE ENCOUNTER
Patient would like to  1mth supply of Eliquis 5mg  Samples at the [facility]  She takes 2 times per day      Thank you

## 2020-01-14 NOTE — TELEPHONE ENCOUNTER
Pt contacted Call Center requested refill of their medication  Medication Name:  Gabapentin tablets    Dosage of Med:  300mg    Frequency of Med:  2 tablets at bedtime    Remaining Medication: This week only    Pharmacy and Location:  [address] [place] [address] Avinash Vasquez [address] [place] [address]    Pt  Preferred Callback Phone Number:  [phone]    Thank you

## 2020-01-15 RX ORDER — GABAPENTIN 300 MG/1
CAPSULE ORAL
Qty: 180 CAPSULE | Refills: 1 | Status: SHIPPED | OUTPATIENT
Start: 2020-01-15 | End: 2020-06-26

## 2020-01-29 ENCOUNTER — OFFICE VISIT (OUTPATIENT)
Dept: FAMILY MEDICINE CLINIC | Facility: CLINIC | Age: 75
End: 2020-01-29
Payer: MEDICARE

## 2020-01-29 VITALS
WEIGHT: 157 LBS | RESPIRATION RATE: 16 BRPM | SYSTOLIC BLOOD PRESSURE: 130 MMHG | DIASTOLIC BLOOD PRESSURE: 72 MMHG | HEART RATE: 60 BPM | HEIGHT: 62 IN | BODY MASS INDEX: 28.89 KG/M2 | TEMPERATURE: 97.9 F

## 2020-01-29 DIAGNOSIS — G89.29 CHRONIC BILATERAL THORACIC BACK PAIN: Primary | ICD-10-CM

## 2020-01-29 DIAGNOSIS — M54.6 CHRONIC BILATERAL THORACIC BACK PAIN: Primary | ICD-10-CM

## 2020-01-29 DIAGNOSIS — M75.41 IMPINGEMENT SYNDROME OF SHOULDER, RIGHT: ICD-10-CM

## 2020-01-29 DIAGNOSIS — G89.29 CHRONIC RIGHT SHOULDER PAIN: ICD-10-CM

## 2020-01-29 DIAGNOSIS — M19.011 OSTEOARTHRITIS OF RIGHT ACROMIOCLAVICULAR JOINT: ICD-10-CM

## 2020-01-29 DIAGNOSIS — M25.511 CHRONIC RIGHT SHOULDER PAIN: ICD-10-CM

## 2020-01-29 DIAGNOSIS — I48.0 PAROXYSMAL ATRIAL FIBRILLATION (HCC): ICD-10-CM

## 2020-01-29 PROBLEM — R60.1 GENERALIZED EDEMA: Status: RESOLVED | Noted: 2019-03-25 | Resolved: 2020-01-29

## 2020-01-29 PROBLEM — Z47.89 SURGICAL AFTERCARE, MUSCULOSKELETAL SYSTEM: Status: RESOLVED | Noted: 2019-11-22 | Resolved: 2020-01-29

## 2020-01-29 PROCEDURE — 99213 OFFICE O/P EST LOW 20 MIN: CPT | Performed by: FAMILY MEDICINE

## 2020-01-29 NOTE — PROGRESS NOTES
Assessment/Plan:     Diagnoses and all orders for this visit:    Chronic bilateral thoracic back pain    Chronic right shoulder pain    Osteoarthritis of right acromioclavicular joint    Impingement syndrome of shoulder, right    Paroxysmal atrial fibrillation (HCC)          Symptom treatment for thoracic back pain and right shoulder pain  Prn ES Tylenol and Tramadol  No NSAIDs  Consider x rays thoracic spine for persistent symptoms  Consider PT and/or steroid injection for impingement syndrome right shoulder  Patient ID: Kate Yuen is a 75 y o  female  2 week history of intermittent pain lower thoracic area bilaterally at time radiating to left flank  "burning sensation"  No pain with movement  No pleuritic pain  No voiding difficulties  No dysuria, gross hematuria  No history of trauma or injury  No history of kidney stones  She has been using prn ES Tylenol  MRI lumbar spine 2015 reviewed  She had cold symptoms one week symptoms have resolved  Persistent fatigue  Current medications reviewed  She is on Sotalol and Eliquis for PAF  The following portions of the patient's history were reviewed and updated as appropriate: allergies, current medications, past family history, past medical history, past social history, past surgical history and problem list     Review of Systems   Constitutional: Positive for fatigue  Negative for appetite change, chills, fever and unexpected weight change  HENT: Negative for congestion, ear pain, rhinorrhea and sore throat  Respiratory: Negative for cough, shortness of breath and wheezing  Cardiovascular: Negative for chest pain, palpitations and leg swelling  Gastrointestinal: Negative for abdominal pain, diarrhea, nausea and vomiting  Genitourinary: Negative for difficulty urinating, dysuria and hematuria  See HPI    Musculoskeletal: Positive for back pain  See HPI   Patient woke up this AM with right shoulder pain at time radiating to right elbow  No trauma or injury  pain alleviated with ES Tylenol  S/p bilateral shoulder intra articular steroid injections 08/2019  X rays shoulders 07/2019 mild AC joint DJD  Objective:    /72   Pulse 60   Temp 97 9 °F (36 6 °C)   Resp 16   Ht 5' 2" (1 575 m)   Wt 71 2 kg (157 lb)   LMP 02/01/1990 (Within Weeks)   BMI 28 72 kg/m²     Wt Readings from Last 3 Encounters:   01/29/20 71 2 kg (157 lb)   01/09/20 71 2 kg (157 lb)   12/20/19 72 3 kg (159 lb 6 4 oz)        Physical Exam   Constitutional: She is oriented to person, place, and time  She appears well-developed and well-nourished  No distress  HENT:   Right Ear: Tympanic membrane normal    Left Ear: Tympanic membrane normal    Nose: Right sinus exhibits no maxillary sinus tenderness and no frontal sinus tenderness  Left sinus exhibits no maxillary sinus tenderness and no frontal sinus tenderness  Mouth/Throat: Oropharynx is clear and moist and mucous membranes are normal  No oral lesions  Eyes: Pupils are equal, round, and reactive to light  Conjunctivae are normal    Cardiovascular: Normal rate, regular rhythm and normal heart sounds  Exam reveals no gallop  No murmur heard  Pulmonary/Chest: Effort normal and breath sounds normal  No respiratory distress  She has no wheezes  She has no rales  Musculoskeletal: She exhibits tenderness  She exhibits no deformity  Mild tenderness lower thoracic paraspinal muscles bilaterally  No thoracic or lumbar spine tenderness  Full ROM right shoulder  Mildly + impingement sign and cross arm test  Negative sulcus sign  Negative empty can sign  Lymphadenopathy:     She has no cervical adenopathy  Neurological: She is alert and oriented to person, place, and time  She has normal strength  Skin: No rash noted  Psychiatric: She has a normal mood and affect  Nursing note and vitals reviewed

## 2020-02-07 ENCOUNTER — TELEPHONE (OUTPATIENT)
Dept: FAMILY MEDICINE CLINIC | Facility: CLINIC | Age: 75
End: 2020-02-07

## 2020-02-07 DIAGNOSIS — G89.29 CHRONIC BILATERAL THORACIC BACK PAIN: Primary | ICD-10-CM

## 2020-02-07 DIAGNOSIS — M54.6 CHRONIC BILATERAL THORACIC BACK PAIN: Primary | ICD-10-CM

## 2020-02-07 NOTE — TELEPHONE ENCOUNTER
Patient was seen for back pain on 1/29/20   There is no improvement & she requested an order for xray        Call Akhil when order is in her chart

## 2020-02-10 ENCOUNTER — APPOINTMENT (OUTPATIENT)
Dept: RADIOLOGY | Facility: MEDICAL CENTER | Age: 75
End: 2020-02-10
Payer: MEDICARE

## 2020-02-10 DIAGNOSIS — M54.6 CHRONIC BILATERAL THORACIC BACK PAIN: ICD-10-CM

## 2020-02-10 DIAGNOSIS — I48.0 PAROXYSMAL ATRIAL FIBRILLATION (HCC): ICD-10-CM

## 2020-02-10 DIAGNOSIS — G89.29 CHRONIC BILATERAL THORACIC BACK PAIN: ICD-10-CM

## 2020-02-10 PROCEDURE — 72072 X-RAY EXAM THORAC SPINE 3VWS: CPT

## 2020-02-13 ENCOUNTER — TELEPHONE (OUTPATIENT)
Dept: FAMILY MEDICINE CLINIC | Facility: CLINIC | Age: 75
End: 2020-02-13

## 2020-02-13 NOTE — TELEPHONE ENCOUNTER
Call re x rays thoracic spine + degenerative changes  If still symptomatic could consider PT  Procedure: Xr Spine Thoracic 3 Vw    Result Date: 2/12/2020  Narrative: THORACIC SPINE INDICATION:   M54 6: Pain in thoracic spine G89 29: Other chronic pain  COMPARISON:  None VIEWS:  XR SPINE THORACIC 3 VW FINDINGS: There is no fracture or pathologic bone lesion  Thoracic vertebral alignment is within normal limits  Age related degenerative changes are seen  There is no displacement of the paraspinal line  The pedicles appear intact  Impression: No acute osseous abnormality   Workstation performed: [id number]

## 2020-02-13 NOTE — TELEPHONE ENCOUNTER
Patient informed, states that the burning sensation is now going across her upper abdomen as well as her back  She was researching and is inquiring if she may have internal shingles, no rash noted at present time?

## 2020-02-14 NOTE — TELEPHONE ENCOUNTER
Call generally cannot make a diagnosis of shingles without a rash  If symptoms have changed she should be re evaluated

## 2020-02-17 DIAGNOSIS — M17.11 PRIMARY OSTEOARTHRITIS OF RIGHT KNEE: ICD-10-CM

## 2020-02-17 RX ORDER — TRAMADOL HYDROCHLORIDE 50 MG/1
TABLET ORAL
Qty: 60 TABLET | Refills: 2 | Status: SHIPPED | OUTPATIENT
Start: 2020-02-17 | End: 2020-03-09

## 2020-02-17 NOTE — TELEPHONE ENCOUNTER
12/31/2019  1  12/02/2019  TRAMADOL HCL 50 MG TABLET  30 0  15  JA MAR  [id number]  [facility] (0055)  2  10 0 MME  Medicare [place]

## 2020-02-17 NOTE — TELEPHONE ENCOUNTER
Patient requested refill on     TRAMADOL 50 mg   1 pill twice daily as needed for pain  30 day supply    [facility]

## 2020-03-06 ENCOUNTER — OFFICE VISIT (OUTPATIENT)
Dept: FAMILY MEDICINE CLINIC | Facility: CLINIC | Age: 75
End: 2020-03-06
Payer: MEDICARE

## 2020-03-06 VITALS
HEART RATE: 66 BPM | DIASTOLIC BLOOD PRESSURE: 68 MMHG | TEMPERATURE: 98.2 F | SYSTOLIC BLOOD PRESSURE: 112 MMHG | WEIGHT: 159 LBS | HEIGHT: 62 IN | BODY MASS INDEX: 29.26 KG/M2 | RESPIRATION RATE: 16 BRPM

## 2020-03-06 DIAGNOSIS — M19.012 ARTHRITIS OF BOTH ACROMIOCLAVICULAR JOINTS: ICD-10-CM

## 2020-03-06 DIAGNOSIS — G25.81 RESTLESS LEGS SYNDROME: ICD-10-CM

## 2020-03-06 DIAGNOSIS — E78.00 HYPERCHOLESTEREMIA: ICD-10-CM

## 2020-03-06 DIAGNOSIS — M85.852 OSTEOPENIA OF NECK OF LEFT FEMUR: ICD-10-CM

## 2020-03-06 DIAGNOSIS — M19.011 ARTHRITIS OF BOTH ACROMIOCLAVICULAR JOINTS: ICD-10-CM

## 2020-03-06 DIAGNOSIS — Z00.00 MEDICARE ANNUAL WELLNESS VISIT, SUBSEQUENT: ICD-10-CM

## 2020-03-06 DIAGNOSIS — I10 ESSENTIAL HYPERTENSION: Primary | ICD-10-CM

## 2020-03-06 DIAGNOSIS — I48.0 PAROXYSMAL ATRIAL FIBRILLATION (HCC): ICD-10-CM

## 2020-03-06 PROBLEM — M25.461 EFFUSION OF RIGHT KNEE: Status: RESOLVED | Noted: 2018-10-30 | Resolved: 2020-03-06

## 2020-03-06 PROCEDURE — 3078F DIAST BP <80 MM HG: CPT | Performed by: FAMILY MEDICINE

## 2020-03-06 PROCEDURE — 1036F TOBACCO NON-USER: CPT | Performed by: FAMILY MEDICINE

## 2020-03-06 PROCEDURE — 1160F RVW MEDS BY RX/DR IN RCRD: CPT | Performed by: FAMILY MEDICINE

## 2020-03-06 PROCEDURE — 3008F BODY MASS INDEX DOCD: CPT | Performed by: FAMILY MEDICINE

## 2020-03-06 PROCEDURE — G0439 PPPS, SUBSEQ VISIT: HCPCS | Performed by: FAMILY MEDICINE

## 2020-03-06 PROCEDURE — 3074F SYST BP LT 130 MM HG: CPT | Performed by: FAMILY MEDICINE

## 2020-03-06 PROCEDURE — 1125F AMNT PAIN NOTED PAIN PRSNT: CPT | Performed by: FAMILY MEDICINE

## 2020-03-06 PROCEDURE — 1170F FXNL STATUS ASSESSED: CPT | Performed by: FAMILY MEDICINE

## 2020-03-06 PROCEDURE — 99214 OFFICE O/P EST MOD 30 MIN: CPT | Performed by: FAMILY MEDICINE

## 2020-03-06 PROCEDURE — 4040F PNEUMOC VAC/ADMIN/RCVD: CPT | Performed by: FAMILY MEDICINE

## 2020-03-06 RX ORDER — SENNOSIDES 8.6 MG
650 CAPSULE ORAL 2 TIMES DAILY
Status: ON HOLD | COMMUNITY
End: 2020-12-16 | Stop reason: SDUPTHER

## 2020-03-06 NOTE — PROGRESS NOTES
Assessment/Plan:     Diagnoses and all orders for this visit:    Essential hypertension  -     CBC and differential  -     Comprehensive metabolic panel    Hypercholesteremia  -     Lipid panel  -     TSH, 3rd generation with Free T4 reflex    Paroxysmal atrial fibrillation (HCC)    Arthritis of both acromioclavicular joints    Osteopenia of neck of left femur    Restless legs syndrome    Medicare annual wellness visit, subsequent    Other orders  -     acetaminophen (TYLENOL) 650 mg CR tablet; Take 650 mg by mouth 2 (two) times a day          Continue with current medications  OV 6 months with repeat labs in 6 months  Patient ID: Collins Chowdary is a 75 y o  female  Follow up visit  Medications reviewed  Hypertension blood pressures have been stable on Amlodipine 2 5 mg daily and Losartan 100 mg daily  11/2019 creatinine 0 92  Electrolytes normal   Hemoglobin 12 7  Hyperlipidemia on Zetia 10 mg daily  11/2019 lipid profile cholesterol 184  Triglycerides 106  HDL 57    LFTs normal   FBS 93  10/2019 mammogram      The following portions of the patient's history were reviewed and updated as appropriate: allergies, current medications, past family history, past medical history, past social history, past surgical history and problem list     Review of Systems   Constitutional: Negative for appetite change, chills, fatigue, fever and unexpected weight change  HENT: Positive for hearing loss (bilateral hearing aids )  Negative for congestion, ear pain, rhinorrhea, sore throat and trouble swallowing  Eyes: Negative for visual disturbance  Respiratory: Negative for cough, shortness of breath and wheezing  Cardiovascular: Negative for chest pain, palpitations and leg swelling  PAF followed by cardiology  Admission 9/15 through 9/17/2018 atrial fibrillation with rapid ventricular response  Patient converted to normal sinus rhythm IV Cardizem    Dose of Sotalol was increased from 80 mg BID to 120 mg BID  On Eliquis 5 mg BID  Electrolytes normal   TSH 1 077  Hemoglobin 14 2  Troponin x 3 normal   Magnesium 2 0   Echocardiogram normal left ventricular systolic function  EF 60%  Grade 2 diastolic dysfunction  Right ventricle systolic pressure mildly increased  Mildly dilated left atrium  Mild MR/TR  No pericardial effusion     Gastrointestinal: Negative for abdominal pain, blood in stool, constipation, diarrhea, nausea and vomiting  Colonoscopy 04/2018 Two benign polyps  Mild diverticulosis left-sided colon  Endocrine: Negative for polydipsia and polyuria         09/2018 DEXA scan T-score -1 5 left femoral neck  On vitamin D supplement   Genitourinary: Negative for difficulty urinating  Musculoskeletal: Positive for arthralgias  Negative for myalgias  Bilateral shoulder pain  She has using Tylenol Arthritis and Tramadol   07/2019 x-rays right shoulder mild degenerative arthritis right AC joint  Left shoulder mild degenerative changes left AC joint  08/2019 bilateral shoulder intra-articular steroid injections via fluoroscopy with symptomatic relief  10/2018 s/p right TKR  Skin: Negative for rash  Neurological: Negative for dizziness and headaches  RLS on Requip   Hematological: Negative for adenopathy  Does not bruise/bleed easily  Psychiatric/Behavioral: Negative for dysphoric mood and sleep disturbance  Objective:      /68   Pulse 66   Temp 98 2 °F (36 8 °C)   Resp 16   Ht 5' 2" (1 575 m)   Wt 72 1 kg (159 lb)   LMP 02/01/1990 (Within Weeks)   BMI 29 08 kg/m²     Wt Readings from Last 3 Encounters:   03/06/20 72 1 kg (159 lb)   01/29/20 71 2 kg (157 lb)   01/09/20 71 2 kg (157 lb)        Physical Exam   Constitutional: She is oriented to person, place, and time  She appears well-developed and well-nourished  No distress  HENT:   Mouth/Throat: Oropharynx is clear and moist and mucous membranes are normal  No oral lesions   Normal dentition  Eyes: Pupils are equal, round, and reactive to light  Conjunctivae and EOM are normal  No scleral icterus  Neck: No JVD present  Carotid bruit is not present  No tracheal deviation present  No thyroid mass and no thyromegaly present  Cardiovascular: Normal rate, regular rhythm and normal heart sounds  Exam reveals no gallop  No murmur heard  Pulmonary/Chest: Effort normal and breath sounds normal  No respiratory distress  She has no wheezes  She has no rales  Abdominal: Soft  Bowel sounds are normal  She exhibits no distension, no abdominal bruit and no mass  There is no hepatosplenomegaly  There is no tenderness  There is no rebound and no guarding  Musculoskeletal: Normal range of motion  She exhibits no edema or deformity  Range of both shoulders  No impingement sign  Mild tenderness over AC joints bilaterally with crepitus   Lymphadenopathy:     She has no cervical adenopathy  Neurological: She is alert and oriented to person, place, and time  She displays normal reflexes  No cranial nerve deficit  Skin: No rash noted  No cyanosis  Nails show no clubbing  Psychiatric: She has a normal mood and affect  Nursing note and vitals reviewed          Lab Results   Component Value Date    WBC 5 74 11/25/2019    HGB 12 7 11/25/2019    HCT 40 0 11/25/2019    MCV 96 11/25/2019     11/25/2019       Lab Results   Component Value Date     05/06/2015    SODIUM 142 11/25/2019    K 4 3 11/25/2019     11/25/2019    CO2 26 11/25/2019    ANIONGAP 9 05/06/2015    AGAP 9 11/25/2019    BUN 21 11/25/2019    CREATININE 0 92 11/25/2019    GLUC 124 09/16/2018    GLUF 93 11/25/2019    CALCIUM 9 6 11/25/2019    AST 16 11/25/2019    ALT 29 11/25/2019    ALKPHOS 59 11/25/2019    PROT 7 0 05/06/2015    TP 7 5 11/25/2019    BILITOT 0 78 05/06/2015    TBILI 0 96 11/25/2019    EGFR 61 11/25/2019       Lab Results   Component Value Date    CHOLESTEROL 184 11/25/2019    CHOLESTEROL 202 (H) 09/16/2018    CHOLESTEROL 163 04/25/2018     Lab Results   Component Value Date    HDL 57 11/25/2019    HDL 59 09/16/2018    HDL 58 04/25/2018     Lab Results   Component Value Date    TRIG 106 11/25/2019    TRIG 109 09/16/2018    TRIG 76 04/25/2018     Lab Results   Component Value Date    [place] 143 09/16/2018    [place] 105 04/25/2018     Lab Results   Component Value Date    [id number] 1 759 09/16/2018     Procedure: Xr Spine Thoracic 3 Vw    Result Date: 2/12/2020  Narrative: THORACIC SPINE INDICATION:   M54 6: Pain in thoracic spine G89 29: Other chronic pain  COMPARISON:  None VIEWS:  XR SPINE THORACIC 3 VW FINDINGS: There is no fracture or pathologic bone lesion  Thoracic vertebral alignment is within normal limits  Age related degenerative changes are seen  There is no displacement of the paraspinal line  The pedicles appear intact  Impression: No acute osseous abnormality   Workstation performed: [id number]

## 2020-03-06 NOTE — PROGRESS NOTES
Assessment and Plan:     Problem List Items Addressed This Visit        Cardiovascular and Mediastinum    Essential hypertension - Primary    Relevant Orders    CBC and differential    Comprehensive metabolic panel    Paroxysmal atrial fibrillation (HCC)       Musculoskeletal and Integument    Osteopenia    Arthritis of both acromioclavicular joints       Other    Restless legs syndrome      Other Visit Diagnoses     Hypercholesteremia        Relevant Orders    Lipid panel    TSH, 3rd generation with Free T4 reflex    Medicare annual wellness visit, subsequent               Preventive health issues were discussed with patient, and age appropriate screening tests were ordered as noted in patient's After Visit Summary  Personalized health advice and appropriate referrals for health education or preventive services given if needed, as noted in patient's After Visit Summary       History of Present Illness:     Patient presents for Medicare Annual Wellness visit    Patient Care Team:  Elvin Buckner MD as PCP - General  Neena Culver, MD Anne Roman DO Sherin Schlichter, MD Riley Kleine, MD (Pain Medicine)  JENNY Gonzalez (Pain Medicine)     Problem List:     Patient Active Problem List   Diagnosis    Essential hypertension    Sacroiliitis ([address] )    Allergic rhinitis    Fibromyalgia    Hyperlipidemia    Insomnia    Lumbar spondylosis    Lumbar stenosis    Osteoarthrosis, hand    Osteoarthritis of knee    Osteopenia    Paroxysmal atrial fibrillation (HCC)    Peripheral neuropathy    Restless legs syndrome    TMJ syndrome    Vitamin D deficiency    Osteoarthritis of shoulder    Carpal tunnel syndrome on right    Arthritis of both acromioclavicular joints      Past Medical and Surgical History:     Past Medical History:   Diagnosis Date    Actinic keratosis     last assessed - [id number]    Arthritis     Atrial fibrillation with rapid ventricular response ([facility] [address] )     last assessed - [id number]    Basal cell carcinoma     Benign colon polyp     last assessed - [id number]    Effusion of knee joint right     Resolved - [id number]    Esophageal reflux     Fibromyalgia     last assessed - [id number]    Fibromyalgia, primary     Hypertension     Palpitations     last assessed - [id number]    Peroneal tendonitis, right     last assessed - 02Oct2013    Rectal hemorrhage     last assessed - 01Oct2014    Right lumbar radiculopathy 3/17/2016    Trochanteric bursitis of left hip 3/9/2018    Trochanteric bursitis, unspecified hip     last assessed - [id number]     Past Surgical History:   Procedure Laterality Date    CATARACT EXTRACTION Bilateral     COLONOSCOPY  03/2018    EYE SURGERY      HYSTERECTOMY      JOINT REPLACEMENT Left     knee    PA REVISE MEDIAN N/CARPAL TUNNEL SURG Right 11/14/2019    Procedure: RELEASE CARPAL TUNNEL;  Surgeon: Omar Sanders MD;  Location: [facility];  Service: Orthopedics    RECTAL POLYPECTOMY      REPLACEMENT TOTAL KNEE Left     last assessed - 27Apr2015    TONSILLECTOMY      TOTAL ABDOMINAL HYSTERECTOMY      TUBAL LIGATION        Family History:     Family History   Problem Relation Age of Onset    Heart disease Mother     Diabetes Mother     Heart disease Father     Coronary artery disease Father     Stroke Father         cerebrovascular accident    Heart attack Father         myocardial infarction    Sudden death Father         scd    Other Family         Back disorder    Coronary artery disease Family     Neuropathy Family     Osteoporosis Family     No Known Problems Daughter     No Known Problems Maternal Grandmother     No Known Problems Maternal Grandfather     No Known Problems Paternal Grandmother     No Known Problems Paternal Grandfather     Cancer Maternal Uncle     Breast cancer Maternal Aunt 72    No Known Problems Son     No Known Problems Maternal Aunt     No Known Problems Maternal Aunt     No Known Problems Maternal Aunt     No Known Problems Paternal Aunt     No Known Problems Paternal Aunt     Anuerysm Neg Hx     Clotting disorder Neg Hx     Arrhythmia Neg Hx     Heart failure Neg Hx       Social History:        Social History     Socioeconomic History    Marital status: /Civil Union     Spouse name: None    Number of children: None    Years of education: None    Highest education level: None   Occupational History    None   Social Needs    Financial resource strain: None    Food insecurity:     Worry: None     Inability: None    Transportation needs:     Medical: None     Non-medical: None   Tobacco Use    Smoking status: Never Smoker    Smokeless tobacco: Never Used   Substance and Sexual Activity    Alcohol use: Not Currently     Comment: occosional - every 3 months    Drug use: Never    Sexual activity: Not Currently   Lifestyle    Physical activity:     Days per week: None     Minutes per session: None    Stress: None   Relationships    Social connections:     Talks on phone: None     Gets together: None     Attends Zoroastrianism service: None     Active member of club or organization: None     Attends meetings of clubs or organizations: None     Relationship status: None    Intimate partner violence:     Fear of current or ex partner: None     Emotionally abused: None     Physically abused: None     Forced sexual activity: None   Other Topics Concern    None   Social History Narrative    None      Medications and Allergies:     Current Outpatient Medications   Medication Sig Dispense Refill    acetaminophen (TYLENOL) 650 mg CR tablet Take 650 mg by mouth 2 (two) times a day      amLODIPine (NORVASC) 5 mg tablet Take 1 tablet (5 mg total) by mouth daily 90 tablet 3    apixaban (ELIQUIS) 5 mg Take 1 tablet (5 mg total) by mouth 2 (two) times a day 42 tablet 0    ascorbic acid (VITAMIN C) 500 mg tablet Take 500 mg by mouth daily       Cholecalciferol ([employer] VITAMIN D) 2000 UNITS CAPS Take 2,000 Units by mouth 2 (two) times a day        Chromium Picolinate (CHROMIUM PICOLATE PO) Take 1 tablet by mouth daily      ezetimibe (ZETIA) 10 mg tablet TAKE 1 TABLET BY MOUTH  DAILY 90 tablet 3    famotidine (PEPCID) 20 mg tablet Take 20 mg by mouth daily        gabapentin (NEURONTIN) 300 mg capsule TAKE 2 CAPSULES BY MOUTH AT BEDTIME 180 capsule 1    losartan (COZAAR) 100 MG tablet Take 1 tablet (100 mg total) by mouth daily for 90 days Losartan Potassium 100 MG Oral Tablet take 1 tablet by mouth once daily  Quantity: 90;  Refills: 3    Josh PATRICIA MD;  Fabricio Saldana 16-Oct-2011 Active 90 tablet 3    rOPINIRole (REQUIP) 0 25 mg tablet TAKE 1 TABLET BY MOUTH  DAILY AT BEDTIME 90 tablet 3    sotalol (BETAPACE) 120 mg tablet Take 1 tablet (120 mg total) by mouth every 12 (twelve) hours for 14 days 28 tablet 0    traMADol (ULTRAM) 50 mg tablet 1 tab twice a day PRN 60 tablet 2    TURMERIC PO Take 1 capsule by mouth 2 (two) times a day      zolpidem (AMBIEN) 10 mg tablet TAKE 1 TABLET BY MOUTH  DAILY AT BEDTIME AS NEEDED  FOR SLEEP 90 tablet 1     No current facility-administered medications for this visit        Allergies   Allergen Reactions    Penicillins Other (See Comments)     As a child calcium deposit in the arm     Ace Inhibitors GI Intolerance     Did feel well on it      Immunizations:     Immunization History   Administered Date(s) Administered    INFLUENZA 12/23/2015, 11/07/2016, 10/18/2017, 12/04/2018    Influenza Split High Dose Preservative Free IM 10/01/2014, 12/23/2015, 11/07/2016, 10/18/2017    Influenza TIV (IM) 10/16/2012    Influenza, high dose seasonal 0 5 mL 12/04/2018, 09/26/2019    Pneumococcal Conjugate 13-Valent 04/27/2015      Health Maintenance:         Topic Date Due    CRC Screening: Colonoscopy  04/03/2028         Topic Date Due    Pneumococcal Vaccine: 65+ Years (2 of 2 - PPSV23) 04/27/2016      Medicare Health Risk Assessment:     /68   Pulse 66   Temp 98 2 °F (36 8 °C)   Resp 16   Ht 5' 2" (1 575 m)   Wt 72 1 kg (159 lb)   LMP 02/01/1990 (Within Weeks)   BMI 29 08 kg/m²      Kit Elise is here for her Subsequent Wellness visit  Last Medicare Wellness visit information reviewed, patient interviewed and updates made to the record today  Health Risk Assessment:   Patient rates overall health as very good  Patient feels that their physical health rating is same  Eyesight was rated as same  Hearing was rated as same  Patient feels that their emotional and mental health rating is same  Pain experienced in the last 7 days has been some  Patient's pain rating has been 6/10  Patient states that she has experienced no weight loss or gain in last 6 months  Depression Screening:   PHQ-2 Score: 0      Fall Risk Screening: In the past year, patient has experienced: no history of falling in past year      Urinary Incontinence Screening:   Patient has leaked urine accidently in the last six months  Home Safety:  Patient has trouble with stairs inside or outside of their home  Patient has working smoke alarms and has working carbon monoxide detector  Home safety hazards include: none  Nutrition:   Current diet is Regular, No Added Salt, Low Saturated Fat and Limited junk food  Medications:   Patient is currently taking over-the-counter supplements  OTC medications include: see medication list  Patient is able to manage medications  Activities of Daily Living (ADLs)/Instrumental Activities of Daily Living (IADLs):   Walk and transfer into and out of bed and chair?: Yes  Dress and groom yourself?: Yes    Bathe or shower yourself?: Yes    Feed yourself?  Yes  Do your laundry/housekeeping?: Yes  Manage your money, pay your bills and track your expenses?: Yes  Make your own meals?: Yes    Do your own shopping?: Yes    Previous Hospitalizations:   Any hospitalizations or ED visits within the last 12 months?: No Advance Care Planning:   Living will: Yes    Advanced directive: Yes      Cognitive Screening:   Provider or family/friend/caregiver concerned regarding cognition?: No    PREVENTIVE SCREENINGS      Cardiovascular Screening:    General: Screening Not Indicated and History Lipid Disorder      Diabetes Screening:     General: Screening Current      Colorectal Cancer Screening:     General: Screening Current      Breast Cancer Screening:     General: Screening Current      Cervical Cancer Screening:    General: Screening Not Indicated      Osteoporosis Screening:    General: Screening Not Indicated      Abdominal Aortic Aneurysm (AAA) Screening:        General: Screening Not Indicated      Lung Cancer Screening:     General: Screening Not Indicated      Hepatitis C Screening:    General: Screening Not Indicated    Other Counseling Topics:   Calcium and vitamin D intake and regular weightbearing exercise         Elvin Buckner MD

## 2020-03-06 NOTE — PATIENT INSTRUCTIONS
Medicare Preventive Visit Patient Instructions  Thank you for completing your Welcome to Medicare Visit or Medicare Annual Wellness Visit today  Your next wellness visit will be due in one year (3/6/2021)  The screening/preventive services that you may require over the next 5-10 years are detailed below  Some tests may not apply to you based off risk factors and/or age  Screening tests ordered at today's visit but not completed yet may show as past due  Also, please note that scanned in results may not display below  Preventive Screenings:  Service Recommendations Previous Testing/Comments   Colorectal Cancer Screening  * Colonoscopy    * Fecal Occult Blood Test (FOBT)/Fecal Immunochemical Test (FIT)  * Fecal DNA/Cologuard Test  * Flexible Sigmoidoscopy Age: 54-65 years old   Colonoscopy: every 10 years (may be performed more frequently if at higher risk)  OR  FOBT/FIT: every 1 year  OR  Cologuard: every 3 years  OR  Sigmoidoscopy: every 5 years  Screening may be recommended earlier than age 48 if at higher risk for colorectal cancer  Also, an individualized decision between you and your healthcare provider will decide whether screening between the ages of 74-80 would be appropriate  Colonoscopy: 04/03/2018  FOBT/FIT: 01/29/2018  Cologuard: Not on file  Sigmoidoscopy: Not on file    Screening Current     Breast Cancer Screening Age: 36 years old  Frequency: every 1-2 years  Not required if history of left and right mastectomy Mammogram: 10/04/2019    Screening Current   Cervical Cancer Screening Between the ages of 21-29, pap smear recommended once every 3 years  Between the ages of 33-67, can perform pap smear with HPV co-testing every 5 years     Recommendations may differ for women with a history of total hysterectomy, cervical cancer, or abnormal pap smears in past  Pap Smear: 08/26/2019    Screening Not Indicated   Hepatitis C Screening Once for adults born between 1945 and 1965  More frequently in patients at high risk for Hepatitis C Hep C Antibody: Not on file       Diabetes Screening 1-2 times per year if you're at risk for diabetes or have pre-diabetes Fasting glucose: 93 mg/dL   A1C: 6 5 %    Screening Current   Cholesterol Screening Once every 5 years if you don't have a lipid disorder  May order more often based on risk factors  Lipid panel: 11/25/2019    Screening Not Indicated  History Lipid Disorder     Other Preventive Screenings Covered by Medicare:  1  Abdominal Aortic Aneurysm (AAA) Screening: covered once if your at risk  You're considered to be at risk if you have a family history of AAA  2  Lung Cancer Screening: covers low dose CT scan once per year if you meet all of the following conditions: (1) Age 50-69; (2) No signs or symptoms of lung cancer; (3) Current smoker or have quit smoking within the last 15 years; (4) You have a tobacco smoking history of at least 30 pack years (packs per day multiplied by number of years you smoked); (5) You get a written order from a healthcare provider  3  Glaucoma Screening: covered annually if you're considered high risk: (1) You have diabetes OR (2) Family history of glaucoma OR (3)  aged 48 and older OR (3)  American aged 72 and older  3  Osteoporosis Screening: covered every 2 years if you meet one of the following conditions: (1) You're estrogen deficient and at risk for osteoporosis based off medical history and other findings; (2) Have a vertebral abnormality; (3) On glucocorticoid therapy for more than 3 months; (4) Have primary hyperparathyroidism; (5) On osteoporosis medications and need to assess response to drug therapy  · Last bone density test (DXA Scan): 09/14/2018  5  HIV Screening: covered annually if you're between the age of 12-76  Also covered annually if you are younger than 13 and older than 72 with risk factors for HIV infection   For pregnant patients, it is covered up to 3 times per pregnancy  Immunizations:  Immunization Recommendations   Influenza Vaccine Annual influenza vaccination during flu season is recommended for all persons aged >= 6 months who do not have contraindications   Pneumococcal Vaccine (Prevnar and Pneumovax)  * Prevnar = PCV13  * Pneumovax = PPSV23   Adults 25-60 years old: 1-3 doses may be recommended based on certain risk factors  Adults 72 years old: Prevnar (PCV13) vaccine recommended followed by Pneumovax (PPSV23) vaccine  If already received PPSV23 since turning 65, then PCV13 recommended at least one year after PPSV23 dose  Hepatitis B Vaccine 3 dose series if at intermediate or high risk (ex: diabetes, end stage renal disease, liver disease)   Tetanus (Td) Vaccine - COST NOT COVERED BY MEDICARE PART B Following completion of primary series, a booster dose should be given every 10 years to maintain immunity against tetanus  Td may also be given as tetanus wound prophylaxis  Tdap Vaccine - COST NOT COVERED BY MEDICARE PART B Recommended at least once for all adults  For pregnant patients, recommended with each pregnancy  Shingles Vaccine (Shingrix) - COST NOT COVERED BY MEDICARE PART B  2 shot series recommended in those aged 48 and above     Health Maintenance Due:      Topic Date Due    CRC Screening: Colonoscopy  04/03/2028     Immunizations Due:      Topic Date Due    Pneumococcal Vaccine: 65+ Years (2 of 2 - PPSV23) 04/27/2016     Advance Directives   What are advance directives? Advance directives are legal documents that state your wishes and plans for medical care  These plans are made ahead of time in case you lose your ability to make decisions for yourself  Advance directives can apply to any medical decision, such as the treatments you want, and if you want to donate organs  What are the types of advance directives? There are many types of advance directives, and each state has rules about how to use them   You may choose a combination of any of the following:  · Living will: This is a written record of the treatment you want  You can also choose which treatments you do not want, which to limit, and which to stop at a certain time  This includes surgery, medicine, IV fluid, and tube feedings  · Durable power of  for healthcare [facility]): This is a written record that states who you want to make healthcare choices for you when you are unable to make them for yourself  This person, called a proxy, is usually a family member or a friend  You may choose more than 1 proxy  · Do not resuscitate (DNR) order:  A DNR order is used in case your heart stops beating or you stop breathing  It is a request not to have certain forms of treatment, such as CPR  A DNR order may be included in other types of advance directives  · Medical directive: This covers the care that you want if you are in a coma, near death, or unable to make decisions for yourself  You can list the treatments you want for each condition  Treatment may include pain medicine, surgery, blood transfusions, dialysis, IV or tube feedings, and a ventilator (breathing machine)  · Values history: This document has questions about your views, beliefs, and how you feel and think about life  This information can help others choose the care that you would choose  Why are advance directives important? An advance directive helps you control your care  Although spoken wishes may be used, it is better to have your wishes written down  Spoken wishes can be misunderstood, or not followed  Treatments may be given even if you do not want them  An advance directive may make it easier for your family to make difficult choices about your care  Urinary Incontinence   Urinary incontinence (UI)  is when you lose control of your bladder  UI develops because your bladder cannot store or empty urine properly  The 3 most common types of UI are stress incontinence, urge incontinence, or both    Medicines: · May be given to help strengthen your bladder control  Report any side effects of medication to your healthcare provider  Do pelvic muscle exercises often:  Your pelvic muscles help you stop urinating  Squeeze these muscles tight for 5 seconds, then relax for 5 seconds  Gradually work up to squeezing for 10 seconds  Do 3 sets of 15 repetitions a day, or as directed  This will help strengthen your pelvic muscles and improve bladder control  Train your bladder:  Go to the bathroom at set times, such as every 2 hours, even if you do not feel the urge to go  You can also try to hold your urine when you feel the urge to go  For example, hold your urine for 5 minutes when you feel the urge to go  As that becomes easier, hold your urine for 10 minutes  Self-care:   · Keep a UI record  Write down how often you leak urine and how much you leak  Make a note of what you were doing when you leaked urine  · Drink liquids as directed  You may need to limit the amount of liquid you drink to help control your urine leakage  Do not drink any liquid right before you go to bed  Limit or do not have drinks that contain caffeine or alcohol  · Prevent constipation  Eat a variety of high-fiber foods  Good examples are high-fiber cereals, beans, vegetables, and whole-grain breads  Walking is the best way to trigger your intestines to have a bowel movement  · Exercise regularly and maintain a healthy weight  Weight loss and exercise will decrease pressure on your bladder and help you control your leakage  · Use a catheter as directed  to help empty your bladder  A catheter is a tiny, plastic tube that is put into your bladder to drain your urine  · Go to behavior therapy as directed  Behavior therapy may be used to help you learn to control your urge to urinate      Weight Management   Why it is important to manage your weight:  Being overweight increases your risk of health conditions such as heart disease, high blood pressure, type 2 diabetes, and certain types of cancer  It can also increase your risk for osteoarthritis, sleep apnea, and other respiratory problems  Aim for a slow, steady weight loss  Even a small amount of weight loss can lower your risk of health problems  How to lose weight safely:  A safe and healthy way to lose weight is to eat fewer calories and get regular exercise  You can lose up about 1 pound a week by decreasing the number of calories you eat by 500 calories each day  Healthy meal plan for weight management:  A healthy meal plan includes a variety of foods, contains fewer calories, and helps you stay healthy  A healthy meal plan includes the following:  · Eat whole-grain foods more often  A healthy meal plan should contain fiber  Fiber is the part of grains, fruits, and vegetables that is not broken down by your body  Whole-grain foods are healthy and provide extra fiber in your diet  Some examples of whole-grain foods are whole-wheat breads and pastas, oatmeal, brown rice, and bulgur  · Eat a variety of vegetables every day  Include dark, leafy greens such as spinach, kale, anayeli greens, and mustard greens  Eat yellow and orange vegetables such as carrots, sweet potatoes, and winter squash  · Eat a variety of fruits every day  Choose fresh or canned fruit (canned in its own juice or light syrup) instead of juice  Fruit juice has very little or no fiber  · Eat low-fat dairy foods  Drink fat-free (skim) milk or 1% milk  Eat fat-free yogurt and low-fat cottage cheese  Try low-fat cheeses such as mozzarella and other reduced-fat cheeses  · Choose meat and other protein foods that are low in fat  Choose beans or other legumes such as split peas or lentils  Choose fish, skinless poultry (chicken or turkey), or lean cuts of red meat (beef or pork)  Before you cook meat or poultry, cut off any visible fat  · Use less fat and oil  Try baking foods instead of frying them   Add less fat, such as margarine, sour cream, regular salad dressing and mayonnaise to foods  Eat fewer high-fat foods  Some examples of high-fat foods include french fries, doughnuts, ice cream, and cakes  · Eat fewer sweets  Limit foods and drinks that are high in sugar  This includes candy, cookies, regular soda, and sweetened drinks  Exercise:  Exercise at least 30 minutes per day on most days of the week  Some examples of exercise include walking, biking, dancing, and swimming  You can also fit in more physical activity by taking the stairs instead of the elevator or parking farther away from stores  Ask your healthcare provider about the best exercise plan for you  © Copyright [employer] 2018 Information is for End User's use only and may not be sold, redistributed or otherwise used for commercial purposes   All illustrations and images included in CareNotes® are the copyrighted property of [employer]  or [address] [place] [address]

## 2020-03-09 ENCOUNTER — EVALUATION (OUTPATIENT)
Dept: PHYSICAL THERAPY | Facility: MEDICAL CENTER | Age: 75
End: 2020-03-09
Payer: MEDICARE

## 2020-03-09 DIAGNOSIS — M79.671 RIGHT FOOT PAIN: ICD-10-CM

## 2020-03-09 DIAGNOSIS — G62.9 PERIPHERAL POLYNEUROPATHY: ICD-10-CM

## 2020-03-09 DIAGNOSIS — M17.11 PRIMARY OSTEOARTHRITIS OF RIGHT KNEE: ICD-10-CM

## 2020-03-09 DIAGNOSIS — M77.51 RIGHT ANKLE TENDONITIS: Primary | ICD-10-CM

## 2020-03-09 PROCEDURE — 97110 THERAPEUTIC EXERCISES: CPT

## 2020-03-09 PROCEDURE — 97161 PT EVAL LOW COMPLEX 20 MIN: CPT

## 2020-03-09 RX ORDER — LOSARTAN POTASSIUM 100 MG/1
TABLET ORAL
Qty: 90 TABLET | Refills: 3 | Status: SHIPPED | OUTPATIENT
Start: 2020-03-09 | End: 2021-01-05 | Stop reason: HOSPADM

## 2020-03-09 RX ORDER — TRAMADOL HYDROCHLORIDE 50 MG/1
TABLET ORAL
Qty: 180 TABLET | Refills: 0 | Status: SHIPPED | OUTPATIENT
Start: 2020-03-09 | End: 2020-03-26 | Stop reason: SDUPTHER

## 2020-03-09 NOTE — PROGRESS NOTES
PT Evaluation     Today's date: 3/9/2020  Patient name: Collins Chowdary  : 1945  MRN: [id number]  Referring provider: Jaren Peña DPM  Dx:   Encounter Diagnosis     ICD-10-CM    1  Right ankle tendonitis M77 51    2  Right foot pain M79 671        Start Time: 903  Stop Time: 946  Total time in clinic (min): 43 minutes    Assessment  Assessment details: Patient is a 76 y/o female who presents with a medical diagnosis of metatarsalgia and tendinitis from Dr Dwaine Grimaldo  Pt presents with hypermobility of R ankle from previous injury  Patient is currently limited in ambulatory distances and duration at this time secondary to increased pain with activity  Patient also has increased pain while driving  Through examination and evaluation patient presents with decreased strength, functional mobility, balance and increased pain at this time  Positive medial talar tilt and resisted ER test confirm previous injury to ankle  Pt currently utilizes orthotic and metatarsal pad for management of pain and function  Pt unable to complete single leg stance secondary to balance deficits and instability at this time  Pt with generalized weakness in B LE with R ankle affected greater than L  Functional deficits previously mentioned have limited the patients ability to participate in household requirements as well as ADLS and recreational activities  Pt will benefit from skilled PT to address functional deficits and return to PLOF  Interventions include therapeutic exercise, therapeutic activity, neuromuscular reeducation, soft tissue mobilization, joint mobilizations and modalities to manage pain as needed  Pt is a good candidate for skilled PT services with active lifestyle and currently drives  to PT services     Impairments: abnormal gait, abnormal or restricted ROM, abnormal movement, impaired balance, impaired physical strength, pain with function and weight-bearing intolerance    Symptom irritability: moderateUnderstanding of Dx/Px/POC: good   Prognosis: good    Goals  STG 2-4 weeks  1  Patient will increase SLS to 5 seconds without LOB for improved balance and ecreased fall risk  2  Patient will perform 3/5 MMT of DF/PF/Eversion/Inversion for improved gait mechanics  LTG 6-8 weeks  1  Patient will be independent with HEP for continued progress  2  Patient will score 5/5 on MMT of PF for ability to ascend stairs without pain  3  Patient will attain FOTO score of at least 58 at time of discharge  4  Patient will perform stairs with step through gait pattern to safely ambulate witin the community and enter home  Plan  Patient would benefit from: skilled physical therapy  Referral necessary: No  Planned modality interventions: cryotherapy and TENS  Planned therapy interventions: ADL retraining, manual therapy, massage, joint mobilization, muscle pump exercises, neuromuscular re-education, balance/weight bearing training, balance, patient education, strengthening, stretching, therapeutic activities, therapeutic exercise, flexibility, functional ROM exercises, gait training, home exercise program and Khoury taping  Frequency: 2x week  Duration in weeks: 8  Plan of Care beginning date: 3/9/2020  Plan of Care expiration date: 5/4/2020  Treatment plan discussed with: patient        Subjective Evaluation    History of Present Illness  Mechanism of injury: Subjective: Chele Shaw is a plesant 74 y/o female presenting to outpatient physical therapy with a referral /from Dr Keturah Cobb with a medical diagnosis of metatarsalgia and tendoninitis of R ankle  Pt with a hisotry of injury to the R ankle approximately 5 years ago  Patient currently presents with functional limitations affecting prolonged mobility  Pt states she is currently unable to walk long distances, and her pain progressively gets worse throughout the day   She is limited in her ability to drive for a long period of time(greater than 30 minutes) secondary to recurrent R ankle pain  Patient has received orthotics in the past and currently has some relief from a metatarsal pad  Patient is limited in her ability to perform ADLs and household duties at this time currently due to pain  Pt lives at home with her  on single level structure with 2 CHIN  Patient states she is unable to perform stairs in the community without pain at this time  Pt states she has a history of neuropathy and notices n/t at night, R>L  Pain  Type: "soreness"  Location: Lateral R ankle  Current: 0/10  Best: 0/10   Worst: 10/10  Alleviates: positioning, ICE, blue emu, denies use of medications for management  Aggravates: activity, stairs    Occupation: retired     Imaging:  None for current condition    PMH: HTN, hyperlipdemia, OA, lumbar back pain,     Previous Surgeries: hysterectomy, tonsils, carpal tunnel release, TKA    Previous Physical Activity: walking    Current Medications: SEE FILE, pt reports no recent changes in medications    NATE: chronic injury    Surgery Date: n/a     MD: Dr Gilbert Garrison    Patient Goals: decrease pain, walk longer without pain, drive without pain               Objective     General Comments:       Ankle/Foot Comments   Ankle Evaluation:  Observations: patient with decreased heel off, no antalgic gait, B Hip drop   Patient with gross decreased lumbar ROM, non painful, pt with forward shift with squat, observable B valgus   B pes planus   Patient with B preexisting orthotics with built up medial rear foot post   No Bruising/Signs of swelling  Skin Characteristics: WFL  Neuromusuclar: defer at this time    Sensation:   WFL: LT intact B LE   ROM  Ankle  AROM/PROM: WFL  Malathi(-)    MMT  (B)Hip Flexion: 4-/5  (B)Hip Abduction: 4-/5  (B)Knee Flexion: 4-/5  (B)Knee Extension: 4-/5  (B)DF: 4-/5  (B)PF: 4-/5  (B)Inversion: 4-/5  (B)Eversion: 4-/5    Joint Mobility  Subtalar: Hyper mobile  Talocrurcal: Hypermobile      Palpation  Peroneal Muscle Belly Musculature: +2  Distal Peroneal Tendon: +2  Lateral ASspect of Cuboid: +2  Metatarsal Head-2nd ,3rd: +3      Special Tests  Anterior Drawer(-)  Medial Talar Tilt: (+)  External Derotation:(+)    FOTO  Primary Score: 49  Anticipated Discharge: 58  Mobility: walking and moving around functional limitation  At least 40 but less than 60  At least 40 but less than 60        Precautions: Neuropathy, fibromyalgia, OA, HTN, Cheesh-Na               Precautions: Neuropathy, fibromyalgia, OA, HTN, Cheesh-Na      Manual  03/09            STM                                                                     Exercise Diary  03/09            Ankle ABCs HEP            Calf Raise HEP            Seated AROM PF/DF rocking HEP            SLS HEP                                                                                                                                                                                                                                Modalities  03/09            [place] nv

## 2020-03-09 NOTE — LETTER
2020    Amador Soliz DPM  [address] [address]    Patient: Heatehr Danielson   YOB: 1945   Date of Visit: 3/9/2020     Encounter Diagnosis     ICD-10-CM    1  Right ankle tendonitis M77 51    2  Right foot pain M79 671        Dear Dr Carlene Reyes: Thank you for your recent referral of Heather Danielson  Please review the attached evaluation summary from Farnaz's recent visit  Please verify that you agree with the plan of care by signing the attached order  If you have any questions or concerns, please do not hesitate to call  I sincerely appreciate the opportunity to share in the care of one of your patients and hope to have another opportunity to work with you in the near future  Sincerely,    Eneida Melgar, PT      Referring Provider:      I certify that I have read the below Plan of Care and certify the need for these services furnished under this plan of treatment while under my care  Amador Soliz DPM  [address] [address]  VIA Facsimile: [phone]          PT Evaluation     Today's date: 3/9/2020  Patient name: Heather Danielson  : 1945  MRN: [id number]  Referring provider: Marisela Palmer DPM  Dx:   Encounter Diagnosis     ICD-10-CM    1  Right ankle tendonitis M77 51    2  Right foot pain M79 671        Start Time: 903  Stop Time: 946  Total time in clinic (min): 43 minutes    Assessment  Assessment details: Patient is a 74 y/o female who presents with a medical diagnosis of metatarsalgia and tendinitis from Dr Carlene Reyes  Pt presents with hypermobility of R ankle from previous injury  Patient is currently limited in ambulatory distances and duration at this time secondary to increased pain with activity  Patient also has increased pain while driving  Through examination and evaluation patient presents with decreased strength, functional mobility, balance and increased pain at this time   Positive medial talar tilt and resisted ER test confirm previous injury to ankle  Pt currently utilizes orthotic and metatarsal pad for management of pain and function  Pt unable to complete single leg stance secondary to balance deficits and instability at this time  Pt with generalized weakness in B LE with R ankle affected greater than L  Functional deficits previously mentioned have limited the patients ability to participate in household requirements as well as ADLS and recreational activities  Pt will benefit from skilled PT to address functional deficits and return to PLOF  Interventions include therapeutic exercise, therapeutic activity, neuromuscular reeducation, soft tissue mobilization, joint mobilizations and modalities to manage pain as needed  Pt is a good candidate for skilled PT services with active lifestyle and currently drives  to PT services  Impairments: abnormal gait, abnormal or restricted ROM, abnormal movement, impaired balance, impaired physical strength, pain with function and weight-bearing intolerance    Symptom irritability: moderateUnderstanding of Dx/Px/POC: good   Prognosis: good    Goals  STG 2-4 weeks  1  Patient will increase SLS to 5 seconds without LOB for improved balance and ecreased fall risk  2  Patient will perform 3/5 MMT of DF/PF/Eversion/Inversion for improved gait mechanics  LTG 6-8 weeks  1  Patient will be independent with HEP for continued progress  2  Patient will score 5/5 on MMT of PF for ability to ascend stairs without pain  3  Patient will attain FOTO score of at least 58 at time of discharge  4  Patient will perform stairs with step through gait pattern to safely ambulate witin the community and enter home        Plan  Patient would benefit from: skilled physical therapy  Referral necessary: No  Planned modality interventions: cryotherapy and TENS  Planned therapy interventions: ADL retraining, manual therapy, massage, joint mobilization, muscle pump exercises, neuromuscular re-education, balance/weight bearing training, balance, patient education, strengthening, stretching, therapeutic activities, therapeutic exercise, flexibility, functional ROM exercises, gait training, home exercise program and Khoury taping  Frequency: 2x week  Duration in weeks: 8  Plan of Care beginning date: 3/9/2020  Plan of Care expiration date: 5/4/2020  Treatment plan discussed with: patient        Subjective Evaluation    History of Present Illness  Mechanism of injury: Subjective: Douglas Alejandre is a plesant 76 y/o female presenting to outpatient physical therapy with a referral /from Dr Dwaine Grimaldo with a medical diagnosis of metatarsalgia and tendoninitis of R ankle  Pt with a hisotry of injury to the R ankle approximately 5 years ago  Patient currently presents with functional limitations affecting prolonged mobility  Pt states she is currently unable to walk long distances, and her pain progressively gets worse throughout the day  She is limited in her ability to drive for a long period of time(greater than 30 minutes) secondary to recurrent R ankle pain  Patient has received orthotics in the past and currently has some relief from a metatarsal pad  Patient is limited in her ability to perform ADLs and household duties at this time currently due to pain  Pt lives at home with her  on single level structure with 2 CHIN  Patient states she is unable to perform stairs in the community without pain at this time  Pt states she has a history of neuropathy and notices n/t at night, R>L        Pain  Type: "soreness"  Location: Lateral R ankle  Current: 0/10  Best: 0/10   Worst: 10/10  Alleviates: positioning, ICE, blue emu, denies use of medications for management  Aggravates: activity, stairs    Occupation: retired     Imaging:  None for current condition    PMH: HTN, hyperlipdemia, OA, lumbar back pain,     Previous Surgeries: hysterectomy, tonsils, carpal tunnel release, TKA    Previous Physical Activity: walking    Current Medications: SEE FILE, pt reports no recent changes in medications    NATE: chronic injury    Surgery Date: n/a     MD: Dr Shad Whitehead    Patient Goals: decrease pain, walk longer without pain, drive without pain               Objective     General Comments:       Ankle/Foot Comments   Ankle Evaluation:  Observations: patient with decreased heel off, no antalgic gait, B Hip drop   Patient with gross decreased lumbar ROM, non painful, pt with forward shift with squat, observable B valgus   B pes planus   Patient with B preexisting orthotics with built up medial rear foot post   No Bruising/Signs of swelling  Skin Characteristics: WFL  Neuromusuclar: defer at this time    Sensation:   WFL: LT intact B LE   ROM  Ankle  AROM/PROM: WFL  Windlass(-)    MMT  (B)Hip Flexion: 4-/5  (B)Hip Abduction: 4-/5  (B)Knee Flexion: 4-/5  (B)Knee Extension: 4-/5  (B)DF: 4-/5  (B)PF: 4-/5  (B)Inversion: 4-/5  (B)Eversion: 4-/5    Joint Mobility  Subtalar: Hyper mobile  Talocrurcal: Hypermobile      Palpation  Peroneal Muscle Belly Musculature: +2  Distal Peroneal Tendon: +2  Lateral ASspect of Cuboid: +2  Metatarsal Head-2nd ,3rd: +3      Special Tests  Anterior Drawer(-)  Medial Talar Tilt: (+)  External Derotation:(+)    FOTO  Primary Score: 49  Anticipated Discharge: 58  Mobility: walking and moving around functional limitation  At least 40 but less than 60  At least 40 but less than 60        Precautions: Neuropathy, fibromyalgia, OA, HTN, [place]               Precautions: Neuropathy, fibromyalgia, OA, HTN, Ione      Manual  03/09            STM                                                                     Exercise Diary  03/09            Ankle ABCs HEP            Calf Raise HEP            Seated AROM PF/DF rocking HEP            SLS HEP Modalities  03/09            [facility]

## 2020-03-10 ENCOUNTER — TRANSCRIBE ORDERS (OUTPATIENT)
Dept: PHYSICAL THERAPY | Facility: MEDICAL CENTER | Age: 75
End: 2020-03-10

## 2020-03-10 DIAGNOSIS — I48.0 PAF (PAROXYSMAL ATRIAL FIBRILLATION) (HCC): ICD-10-CM

## 2020-03-10 DIAGNOSIS — M77.51 RIGHT ANKLE TENDONITIS: Primary | ICD-10-CM

## 2020-03-10 RX ORDER — SOTALOL HYDROCHLORIDE 120 MG/1
120 TABLET ORAL EVERY 12 HOURS
Qty: 180 TABLET | Refills: 3 | Status: SHIPPED | OUTPATIENT
Start: 2020-03-10 | End: 2020-09-24 | Stop reason: SDUPTHER

## 2020-03-12 ENCOUNTER — OFFICE VISIT (OUTPATIENT)
Dept: PHYSICAL THERAPY | Facility: MEDICAL CENTER | Age: 75
End: 2020-03-12
Payer: MEDICARE

## 2020-03-12 DIAGNOSIS — M79.671 RIGHT FOOT PAIN: ICD-10-CM

## 2020-03-12 DIAGNOSIS — M77.51 RIGHT ANKLE TENDONITIS: Primary | ICD-10-CM

## 2020-03-12 PROCEDURE — 97140 MANUAL THERAPY 1/> REGIONS: CPT

## 2020-03-12 PROCEDURE — 97110 THERAPEUTIC EXERCISES: CPT

## 2020-03-12 NOTE — PROGRESS NOTES
Daily Note     Today's date: 3/12/2020  Patient name: Katy Chavez  : 1945  MRN: [id number]  Referring provider: Tonny Bueno DPM  Dx:   Encounter Diagnosis     ICD-10-CM    1  Right ankle tendonitis M77 51    2  Right foot pain M79 671        Start Time: 923  Stop Time: 952  Total time in clinic (min): 29 minutes    Subjective: Pt reports no significant change in function  She states that she does not experience pain early in the morning, and it tends to come on later in the day  Objective: See treatment diary below      Assessment: Tolerated treatment well  Patient educated on performing toe crunches as part of HEP at this time  Pt requires min cues throughout session in performance of exercise at this time  If patient tolerates current session well, increased WB and functional activity will be incorporated into future sessions  Patient demonstrated fatigue post treatment and would benefit from continued PT      20: Pt has not attended skilled PT services since 20 secondary to [place] pandemic  PT to discharge at this time  Plan: Continue per plan of care  Progress treatment as tolerated         Precautions: Neuropathy, fibromyalgia, OA, HTN, [place]      Manual             [facility]  8'peroneals                                                                   Exercise Diary             Ankle ABCs HEP 1x           Calf Raise HEP seated 4lb weight  x30 reps           Seated AROM PF/DF rocking HEP BAPS board  x20 each direction           SLS HEP 4" x30 sec           Resisted DF/NE  rtb 2x20           Resisted INV/EV  rtb   2x20           Toe Curls  2x 1:30                                                                                                                                                                                        Modalities  03/09 03/10           [place] concepcion defer

## 2020-03-17 ENCOUNTER — APPOINTMENT (OUTPATIENT)
Dept: PHYSICAL THERAPY | Facility: MEDICAL CENTER | Age: 75
End: 2020-03-17
Payer: MEDICARE

## 2020-03-19 ENCOUNTER — APPOINTMENT (OUTPATIENT)
Dept: PHYSICAL THERAPY | Facility: MEDICAL CENTER | Age: 75
End: 2020-03-19
Payer: MEDICARE

## 2020-03-20 ENCOUNTER — APPOINTMENT (OUTPATIENT)
Dept: PHYSICAL THERAPY | Facility: MEDICAL CENTER | Age: 75
End: 2020-03-20
Payer: MEDICARE

## 2020-03-24 ENCOUNTER — TELEPHONE (OUTPATIENT)
Dept: CARDIOLOGY CLINIC | Facility: CLINIC | Age: 75
End: 2020-03-24

## 2020-03-24 ENCOUNTER — APPOINTMENT (OUTPATIENT)
Dept: PHYSICAL THERAPY | Facility: MEDICAL CENTER | Age: 75
End: 2020-03-24
Payer: MEDICARE

## 2020-03-24 DIAGNOSIS — I48.0 PAROXYSMAL ATRIAL FIBRILLATION (HCC): ICD-10-CM

## 2020-03-24 NOTE — TELEPHONE ENCOUNTER
Patient explained she is applying for assistance program- she said she spoke with someone about her application & she is asking for a call back

## 2020-03-26 DIAGNOSIS — M17.11 PRIMARY OSTEOARTHRITIS OF RIGHT KNEE: ICD-10-CM

## 2020-03-26 RX ORDER — TRAMADOL HYDROCHLORIDE 50 MG/1
TABLET ORAL
Qty: 60 TABLET | Refills: 3 | Status: SHIPPED | OUTPATIENT
Start: 2020-03-26 | End: 2020-08-17

## 2020-03-26 NOTE — TELEPHONE ENCOUNTER
03/13/2020  1  03/09/2020  TRAMADOL HCL 50 MG TABLET  60 0  30  JA MAR  [id number]  [facility] ([id number])  0  10 0 MME  [place]  [place]

## 2020-03-26 NOTE — TELEPHONE ENCOUNTER
Patient called stating [facility] needs a new script sent for Tramadol 50mg 30 day supply 60 tabs  They told her insurance would not cover the 180 tabs and a new script would need to be sent

## 2020-03-27 ENCOUNTER — APPOINTMENT (OUTPATIENT)
Dept: PHYSICAL THERAPY | Facility: MEDICAL CENTER | Age: 75
End: 2020-03-27
Payer: MEDICARE

## 2020-03-31 ENCOUNTER — APPOINTMENT (OUTPATIENT)
Dept: PHYSICAL THERAPY | Facility: MEDICAL CENTER | Age: 75
End: 2020-03-31
Payer: MEDICARE

## 2020-04-08 DIAGNOSIS — I48.0 PAROXYSMAL ATRIAL FIBRILLATION (HCC): ICD-10-CM

## 2020-04-20 DIAGNOSIS — G47.01 INSOMNIA DUE TO MEDICAL CONDITION: ICD-10-CM

## 2020-04-20 RX ORDER — ZOLPIDEM TARTRATE 10 MG/1
TABLET ORAL
Qty: 90 TABLET | Refills: 1 | Status: SHIPPED | OUTPATIENT
Start: 2020-04-20 | End: 2020-10-09 | Stop reason: SDUPTHER

## 2020-05-10 DIAGNOSIS — I10 HYPERTENSION, UNSPECIFIED TYPE: ICD-10-CM

## 2020-05-10 RX ORDER — AMLODIPINE BESYLATE 5 MG/1
TABLET ORAL
Qty: 90 TABLET | Refills: 3 | Status: SHIPPED | OUTPATIENT
Start: 2020-05-10 | End: 2020-12-21 | Stop reason: ALTCHOICE

## 2020-05-25 DIAGNOSIS — M79.7 FIBROMYALGIA: ICD-10-CM

## 2020-05-25 DIAGNOSIS — M47.816 LUMBAR SPONDYLOSIS: ICD-10-CM

## 2020-05-26 RX ORDER — GABAPENTIN 300 MG/1
CAPSULE ORAL
Qty: 180 CAPSULE | Refills: 1 | OUTPATIENT
Start: 2020-05-26

## 2020-06-15 ENCOUNTER — TELEPHONE (OUTPATIENT)
Dept: CARDIOLOGY CLINIC | Facility: CLINIC | Age: 75
End: 2020-06-15

## 2020-06-16 ENCOUNTER — CLINICAL SUPPORT (OUTPATIENT)
Dept: CARDIOLOGY CLINIC | Facility: CLINIC | Age: 75
End: 2020-06-16
Payer: MEDICARE

## 2020-06-16 VITALS
BODY MASS INDEX: 27.79 KG/M2 | SYSTOLIC BLOOD PRESSURE: 121 MMHG | TEMPERATURE: 96.6 F | WEIGHT: 151 LBS | DIASTOLIC BLOOD PRESSURE: 70 MMHG | HEIGHT: 62 IN | HEART RATE: 73 BPM

## 2020-06-16 DIAGNOSIS — I48.0 PAROXYSMAL ATRIAL FIBRILLATION (HCC): Primary | ICD-10-CM

## 2020-06-16 PROCEDURE — 93000 ELECTROCARDIOGRAM COMPLETE: CPT | Performed by: INTERNAL MEDICINE

## 2020-06-16 PROCEDURE — 3074F SYST BP LT 130 MM HG: CPT | Performed by: INTERNAL MEDICINE

## 2020-06-16 PROCEDURE — 99211 OFF/OP EST MAY X REQ PHY/QHP: CPT | Performed by: INTERNAL MEDICINE

## 2020-06-16 PROCEDURE — 1160F RVW MEDS BY RX/DR IN RCRD: CPT | Performed by: INTERNAL MEDICINE

## 2020-06-16 PROCEDURE — 3078F DIAST BP <80 MM HG: CPT | Performed by: INTERNAL MEDICINE

## 2020-06-16 PROCEDURE — 4040F PNEUMOC VAC/ADMIN/RCVD: CPT | Performed by: INTERNAL MEDICINE

## 2020-06-17 DIAGNOSIS — I48.0 PAROXYSMAL ATRIAL FIBRILLATION (HCC): Primary | ICD-10-CM

## 2020-06-22 ENCOUNTER — PROCEDURE VISIT (OUTPATIENT)
Dept: CARDIOLOGY CLINIC | Facility: CLINIC | Age: 75
End: 2020-06-22

## 2020-06-22 DIAGNOSIS — R00.2 PALPITATIONS: Primary | ICD-10-CM

## 2020-06-22 PROCEDURE — RECHECK: Performed by: INTERNAL MEDICINE

## 2020-06-25 ENCOUNTER — HOSPITAL ENCOUNTER (OUTPATIENT)
Dept: NON INVASIVE DIAGNOSTICS | Facility: HOSPITAL | Age: 75
Discharge: HOME | End: 2020-06-25
Payer: MEDICARE

## 2020-06-25 DIAGNOSIS — I48.0 PAROXYSMAL ATRIAL FIBRILLATION (HCC): ICD-10-CM

## 2020-06-25 PROCEDURE — 93227 XTRNL ECG REC<48 HR R&I: CPT | Performed by: INTERNAL MEDICINE

## 2020-06-25 PROCEDURE — 93225 XTRNL ECG REC<48 HRS REC: CPT

## 2020-06-25 PROCEDURE — 93226 XTRNL ECG REC<48 HR SCAN A/R: CPT

## 2020-06-26 ENCOUNTER — TELEPHONE (OUTPATIENT)
Dept: CARDIOLOGY CLINIC | Facility: CLINIC | Age: 75
End: 2020-06-26

## 2020-06-26 DIAGNOSIS — M47.816 LUMBAR SPONDYLOSIS: ICD-10-CM

## 2020-06-26 DIAGNOSIS — M79.7 FIBROMYALGIA: ICD-10-CM

## 2020-06-26 RX ORDER — GABAPENTIN 300 MG/1
CAPSULE ORAL
Qty: 180 CAPSULE | Refills: 1 | Status: SHIPPED | OUTPATIENT
Start: 2020-06-26 | End: 2021-01-12 | Stop reason: SDUPTHER

## 2020-06-26 NOTE — TELEPHONE ENCOUNTER
Med refill     Name of medication:gabapentin (NEURONTIN) 300 mg capsule       Frequency: TAKE 2 CAPSULES BY MOUTH AT BEDTIME    How many left: 2 weeks left     Pharmacy: [address] call back # [phone]     Thank you

## 2020-06-26 NOTE — TELEPHONE ENCOUNTER
I s/w pt she confirmed she's taking gabapentin 300 mg (2) at HS, it is working and not causing any s/e  Pls send refill to [facility] her mail order pharmacy  Looks like #180 with 1 RF was LP on 1/15/20 by FQ, no pending ov  No need to c/b once script sent per pt

## 2020-07-31 ENCOUNTER — PROCEDURE VISIT (OUTPATIENT)
Dept: FAMILY MEDICINE CLINIC | Facility: CLINIC | Age: 75
End: 2020-07-31
Payer: MEDICARE

## 2020-07-31 VITALS
WEIGHT: 154 LBS | SYSTOLIC BLOOD PRESSURE: 128 MMHG | HEART RATE: 62 BPM | TEMPERATURE: 98.4 F | BODY MASS INDEX: 28.17 KG/M2 | DIASTOLIC BLOOD PRESSURE: 64 MMHG | RESPIRATION RATE: 16 BRPM

## 2020-07-31 DIAGNOSIS — L57.0 ACTINIC KERATOSES: Primary | ICD-10-CM

## 2020-07-31 PROCEDURE — 17003 DESTRUCT PREMALG LES 2-14: CPT | Performed by: FAMILY MEDICINE

## 2020-07-31 PROCEDURE — 17000 DESTRUCT PREMALG LESION: CPT | Performed by: FAMILY MEDICINE

## 2020-07-31 NOTE — PROGRESS NOTES
Lesion Destruction  Date/Time: 7/31/2020 2:20 PM  Performed by: Thalia Escobar MD  Authorized by: Thalia Escobar MD     Procedure Details - Lesion Destruction:     Number of Lesions:  2  Lesion 1:     Body area:  Head/neck    Head/neck location:  L cheek    Initial size (mm):  3    Final defect size (mm):  3    Malignancy: pre-malignant lesion      Destruction method: cryotherapy    Lesion 2:     Body area:  [address]    Head/neck location:  L cheek    Initial size (mm):  3    Final defect size (mm):  3    Malignancy: pre-malignant lesion      Destruction method: cryotherapy    Lesion 6:      2 AKs left cheek  Lesions treated with light cryo surgery 2 freeze thaw cycles  Written wound care instructions provided

## 2020-08-03 ENCOUNTER — APPOINTMENT (OUTPATIENT)
Dept: LAB | Facility: MEDICAL CENTER | Age: 75
End: 2020-08-03
Payer: MEDICARE

## 2020-08-03 LAB
ALBUMIN SERPL BCP-MCNC: 3.7 G/DL (ref 3.5–5)
ALP SERPL-CCNC: 57 U/L (ref 46–116)
ALT SERPL W P-5'-P-CCNC: 22 U/L (ref 12–78)
ANION GAP SERPL CALCULATED.3IONS-SCNC: 6 MMOL/L (ref 4–13)
AST SERPL W P-5'-P-CCNC: 17 U/L (ref 5–45)
BASOPHILS # BLD AUTO: 0.04 THOUSANDS/ΜL (ref 0–0.1)
BASOPHILS NFR BLD AUTO: 1 % (ref 0–1)
BILIRUB SERPL-MCNC: 1.03 MG/DL (ref 0.2–1)
BUN SERPL-MCNC: 20 MG/DL (ref 5–25)
CALCIUM SERPL-MCNC: 9.5 MG/DL (ref 8.3–10.1)
CHLORIDE SERPL-SCNC: 107 MMOL/L (ref 100–108)
CHOLEST SERPL-MCNC: 182 MG/DL (ref 50–200)
CO2 SERPL-SCNC: 29 MMOL/L (ref 21–32)
CREAT SERPL-MCNC: 0.84 MG/DL (ref 0.6–1.3)
EOSINOPHIL # BLD AUTO: 0.18 THOUSAND/ΜL (ref 0–0.61)
EOSINOPHIL NFR BLD AUTO: 3 % (ref 0–6)
ERYTHROCYTE [DISTWIDTH] IN BLOOD BY AUTOMATED COUNT: 12.8 % (ref 11.6–15.1)
GFR SERPL CREATININE-BSD FRML MDRD: 68 ML/MIN/1.73SQ M
GLUCOSE P FAST SERPL-MCNC: 91 MG/DL (ref 65–99)
HCT VFR BLD AUTO: 39.5 % (ref 34.8–46.1)
HDLC SERPL-MCNC: 54 MG/DL
HGB BLD-MCNC: 12.9 G/DL (ref 11.5–15.4)
IMM GRANULOCYTES # BLD AUTO: 0.01 THOUSAND/UL (ref 0–0.2)
IMM GRANULOCYTES NFR BLD AUTO: 0 % (ref 0–2)
LDLC SERPL CALC-MCNC: 104 MG/DL (ref 0–100)
LYMPHOCYTES # BLD AUTO: 2.04 THOUSANDS/ΜL (ref 0.6–4.47)
LYMPHOCYTES NFR BLD AUTO: 38 % (ref 14–44)
MCH RBC QN AUTO: 32 PG (ref 26.8–34.3)
MCHC RBC AUTO-ENTMCNC: 32.7 G/DL (ref 31.4–37.4)
MCV RBC AUTO: 98 FL (ref 82–98)
MONOCYTES # BLD AUTO: 0.57 THOUSAND/ΜL (ref 0.17–1.22)
MONOCYTES NFR BLD AUTO: 11 % (ref 4–12)
NEUTROPHILS # BLD AUTO: 2.58 THOUSANDS/ΜL (ref 1.85–7.62)
NEUTS SEG NFR BLD AUTO: 47 % (ref 43–75)
NONHDLC SERPL-MCNC: 128 MG/DL
NRBC BLD AUTO-RTO: 0 /100 WBCS
PLATELET # BLD AUTO: 241 THOUSANDS/UL (ref 149–390)
PMV BLD AUTO: 10.9 FL (ref 8.9–12.7)
POTASSIUM SERPL-SCNC: 4.1 MMOL/L (ref 3.5–5.3)
PROT SERPL-MCNC: 7.5 G/DL (ref 6.4–8.2)
RBC # BLD AUTO: 4.03 MILLION/UL (ref 3.81–5.12)
SODIUM SERPL-SCNC: 142 MMOL/L (ref 136–145)
TRIGL SERPL-MCNC: 121 MG/DL
TSH SERPL DL<=0.05 MIU/L-ACNC: 1.75 UIU/ML (ref 0.36–3.74)
WBC # BLD AUTO: 5.42 THOUSAND/UL (ref 4.31–10.16)

## 2020-08-03 PROCEDURE — 84443 ASSAY THYROID STIM HORMONE: CPT | Performed by: FAMILY MEDICINE

## 2020-08-03 PROCEDURE — 80061 LIPID PANEL: CPT | Performed by: FAMILY MEDICINE

## 2020-08-03 PROCEDURE — 36415 COLL VENOUS BLD VENIPUNCTURE: CPT | Performed by: FAMILY MEDICINE

## 2020-08-03 PROCEDURE — 80053 COMPREHEN METABOLIC PANEL: CPT | Performed by: FAMILY MEDICINE

## 2020-08-03 PROCEDURE — 85025 COMPLETE CBC W/AUTO DIFF WBC: CPT | Performed by: FAMILY MEDICINE

## 2020-08-04 ENCOUNTER — TELEPHONE (OUTPATIENT)
Dept: FAMILY MEDICINE CLINIC | Facility: CLINIC | Age: 75
End: 2020-08-04

## 2020-08-04 DIAGNOSIS — M51.34 THORACIC DEGENERATIVE DISC DISEASE: Primary | ICD-10-CM

## 2020-08-04 NOTE — TELEPHONE ENCOUNTER
Call re labs  All labs are normal  No anemia  FBS 91 < 100  Kidney and liver tests normal  Cholesterol 182 < 200  TSH or thyroid test normal  ( see note re husbands labs ) continue with current medications       Recent Results (from the past 336 hour(s))   CBC and differential    Collection Time: 08/03/20  7:13 AM   Result Value Ref Range    WBC 5 42 4 31 - 10 16 Thousand/uL    RBC 4 03 3 81 - 5 12 Million/uL    Hemoglobin 12 9 11 5 - 15 4 g/dL    Hematocrit 39 5 34 8 - 46 1 %    MCV 98 82 - 98 fL    MCH 32 0 26 8 - 34 3 pg    MCHC 32 7 31 4 - 37 4 g/dL    RDW 12 8 11 6 - 15 1 %    MPV 10 9 8 9 - 12 7 fL    Platelets 142 099 - 066 Thousands/uL    nRBC 0 /100 WBCs    Neutrophils Relative 47 43 - 75 %    Immat GRANS % 0 0 - 2 %    Lymphocytes Relative 38 14 - 44 %    Monocytes Relative 11 4 - 12 %    Eosinophils Relative 3 0 - 6 %    Basophils Relative 1 0 - 1 %    Neutrophils Absolute 2 58 1 85 - 7 62 Thousands/µL    Immature Grans Absolute 0 01 0 00 - 0 20 Thousand/uL    Lymphocytes Absolute 2 04 0 60 - 4 47 Thousands/µL    Monocytes Absolute 0 57 0 17 - 1 22 Thousand/µL    Eosinophils Absolute 0 18 0 00 - 0 61 Thousand/µL    Basophils Absolute 0 04 0 00 - 0 10 Thousands/µL   Comprehensive metabolic panel    Collection Time: 08/03/20  7:13 AM   Result Value Ref Range    Sodium 142 136 - 145 mmol/L    Potassium 4 1 3 5 - 5 3 mmol/L    Chloride 107 100 - 108 mmol/L    CO2 29 21 - 32 mmol/L    ANION GAP 6 4 - 13 mmol/L    BUN 20 5 - 25 mg/dL    Creatinine 0 84 0 60 - 1 30 mg/dL    Glucose, Fasting 91 65 - 99 mg/dL    Calcium 9 5 8 3 - 10 1 mg/dL    AST 17 5 - 45 U/L    ALT 22 12 - 78 U/L    Alkaline Phosphatase 57 46 - 116 U/L    Total Protein 7 5 6 4 - 8 2 g/dL    Albumin 3 7 3 5 - 5 0 g/dL    Total Bilirubin 1 03 (H) 0 20 - 1 00 mg/dL    eGFR 68 ml/min/1 73sq m   Lipid panel    Collection Time: 08/03/20  7:13 AM   Result Value Ref Range    Cholesterol 182 50 - 200 mg/dL    Triglycerides 121 <=150 mg/dL    HDL, Direct 54 >=40 mg/dL    LDL Calculated 104 (H) 0 - 100 mg/dL    Non-HDL-Chol (CHOL-HDL) 128 mg/dl   TSH, 3rd generation with Free T4 reflex    Collection Time: 08/03/20  7:13 AM   Result Value Ref Range    TSH 3RD GENERATON 1 750 0 358 - 3 740 uIU/mL

## 2020-08-17 DIAGNOSIS — M17.11 PRIMARY OSTEOARTHRITIS OF RIGHT KNEE: ICD-10-CM

## 2020-08-18 RX ORDER — TRAMADOL HYDROCHLORIDE 50 MG/1
TABLET ORAL
Qty: 180 TABLET | Refills: 1 | Status: SHIPPED | OUTPATIENT
Start: 2020-08-18 | End: 2021-03-26

## 2020-09-04 DIAGNOSIS — E78.00 HYPERCHOLESTEREMIA: ICD-10-CM

## 2020-09-04 RX ORDER — EZETIMIBE 10 MG/1
TABLET ORAL
Qty: 90 TABLET | Refills: 3 | Status: SHIPPED | OUTPATIENT
Start: 2020-09-04 | End: 2021-06-23

## 2020-09-08 ENCOUNTER — OFFICE VISIT (OUTPATIENT)
Dept: FAMILY MEDICINE CLINIC | Facility: CLINIC | Age: 75
End: 2020-09-08
Payer: MEDICARE

## 2020-09-08 VITALS
HEART RATE: 78 BPM | HEIGHT: 62 IN | RESPIRATION RATE: 16 BRPM | TEMPERATURE: 97.9 F | WEIGHT: 153 LBS | BODY MASS INDEX: 28.16 KG/M2 | SYSTOLIC BLOOD PRESSURE: 116 MMHG | DIASTOLIC BLOOD PRESSURE: 72 MMHG

## 2020-09-08 DIAGNOSIS — I10 ESSENTIAL HYPERTENSION: Primary | ICD-10-CM

## 2020-09-08 DIAGNOSIS — E78.00 HYPERCHOLESTEREMIA: ICD-10-CM

## 2020-09-08 DIAGNOSIS — I48.0 PAROXYSMAL ATRIAL FIBRILLATION (HCC): ICD-10-CM

## 2020-09-08 DIAGNOSIS — G25.81 RESTLESS LEGS SYNDROME: ICD-10-CM

## 2020-09-08 DIAGNOSIS — Z23 NEED FOR VACCINATION: ICD-10-CM

## 2020-09-08 DIAGNOSIS — M48.061 SPINAL STENOSIS OF LUMBAR REGION WITHOUT NEUROGENIC CLAUDICATION: ICD-10-CM

## 2020-09-08 PROCEDURE — G0008 ADMIN INFLUENZA VIRUS VAC: HCPCS

## 2020-09-08 PROCEDURE — 99214 OFFICE O/P EST MOD 30 MIN: CPT | Performed by: FAMILY MEDICINE

## 2020-09-08 PROCEDURE — 90662 IIV NO PRSV INCREASED AG IM: CPT

## 2020-09-08 NOTE — PROGRESS NOTES
Assessment/Plan:     Diagnoses and all orders for this visit:    Essential hypertension  -     CBC and differential  -     Comprehensive metabolic panel    Hypercholesteremia  -     Lipid panel    Paroxysmal atrial fibrillation (HCC)    Restless legs syndrome    Spinal stenosis of lumbar region without neurogenic claudication    Need for vaccination  -     influenza vaccine, high-dose, PF 0 7 mL (FLUZONE HIGH-DOSE)          Continue with current medications  OV 6 months with repeat labs  Flu vaccine today  Patient ID: Ana Grant is a 67 y o  female  Follow up visit  Medications reviewed  Hypertension blood pressures have been stable on Amlodipine 2 5 mg daily and Losartan 100 mg daily  08/2020 creatinine 0 84  Electrolytes normal   Hemoglobin 12 9  Hyperlipidemia on Zetia 10 mg daily  08/2020 lipid profile cholesterol 182  Triglycerides 121  HDL 54    LFTs normal except for total bilirubin 1 03   FBS 91  10/2019 mammogram      Recent Results (from the past 1008 hour(s))   CBC and differential    Collection Time: 08/03/20  7:13 AM   Result Value Ref Range    WBC 5 42 4 31 - 10 16 Thousand/uL    RBC 4 03 3 81 - 5 12 Million/uL    Hemoglobin 12 9 11 5 - 15 4 g/dL    Hematocrit 39 5 34 8 - 46 1 %    MCV 98 82 - 98 fL    MCH 32 0 26 8 - 34 3 pg    MCHC 32 7 31 4 - 37 4 g/dL    RDW 12 8 11 6 - 15 1 %    MPV 10 9 8 9 - 12 7 fL    Platelets 843 697 - 384 Thousands/uL    nRBC 0 /100 WBCs    Neutrophils Relative 47 43 - 75 %    Immat GRANS % 0 0 - 2 %    Lymphocytes Relative 38 14 - 44 %    Monocytes Relative 11 4 - 12 %    Eosinophils Relative 3 0 - 6 %    Basophils Relative 1 0 - 1 %    Neutrophils Absolute 2 58 1 85 - 7 62 Thousands/µL    Immature Grans Absolute 0 01 0 00 - 0 20 Thousand/uL    Lymphocytes Absolute 2 04 0 60 - 4 47 Thousands/µL    Monocytes Absolute 0 57 0 17 - 1 22 Thousand/µL    Eosinophils Absolute 0 18 0 00 - 0 61 Thousand/µL    Basophils Absolute 0 04 0 00 - 0 10 Thousands/µL Comprehensive metabolic panel    Collection Time: 08/03/20  7:13 AM   Result Value Ref Range    Sodium 142 136 - 145 mmol/L    Potassium 4 1 3 5 - 5 3 mmol/L    Chloride 107 100 - 108 mmol/L    CO2 29 21 - 32 mmol/L    ANION GAP 6 4 - 13 mmol/L    BUN 20 5 - 25 mg/dL    Creatinine 0 84 0 60 - 1 30 mg/dL    Glucose, Fasting 91 65 - 99 mg/dL    Calcium 9 5 8 3 - 10 1 mg/dL    AST 17 5 - 45 U/L    ALT 22 12 - 78 U/L    Alkaline Phosphatase 57 46 - 116 U/L    Total Protein 7 5 6 4 - 8 2 g/dL    Albumin 3 7 3 5 - 5 0 g/dL    Total Bilirubin 1 03 (H) 0 20 - 1 00 mg/dL    eGFR 68 ml/min/1 73sq m   Lipid panel    Collection Time: 08/03/20  7:13 AM   Result Value Ref Range    Cholesterol 182 50 - 200 mg/dL    Triglycerides 121 <=150 mg/dL    HDL, Direct 54 >=40 mg/dL    LDL Calculated 104 (H) 0 - 100 mg/dL    Non-HDL-Chol (CHOL-HDL) 128 mg/dl   TSH, 3rd generation with Free T4 reflex    Collection Time: 08/03/20  7:13 AM   Result Value Ref Range    TSH 3RD GENERATON 1 750 0 358 - 3 740 uIU/mL         The following portions of the patient's history were reviewed and updated as appropriate: allergies, current medications, past family history, past medical history, past social history, past surgical history and problem list     Review of Systems   Constitutional: Negative for appetite change, chills, fatigue, fever and unexpected weight change  HENT: Positive for hearing loss (bilateral hearing aids )  Negative for congestion, ear pain, rhinorrhea, sore throat and trouble swallowing  Eyes: Negative for visual disturbance  Respiratory: Negative for cough, shortness of breath and wheezing  Cardiovascular: Positive for palpitations (intermittent palpitations  )  Negative for chest pain and leg swelling  PAF followed by cardiology  On Sotalol and Eliquis   06/2020 24 hour Holter monitor predominately sinus bradycardia with avg HR 59 beats/min  Low burden of PACs and PVCs   No significant pauses or advanced heart block  Admission 9/15/2018 through 9/17/2018 for atrial fibrillation with rapid ventricular response  Patient converted to normal sinus rhythm with IV Cardizem  Electrolytes normal   TSH 1 077  Hemoglobin 14 2  Troponin x 3 normal   Magnesium 2 0   Echocardiogram normal left ventricular systolic function  EF 60%  Grade 2 diastolic dysfunction  Right ventricle systolic pressure mildly increased  Mildly dilated left atrium  Mild MR/TR  No pericardial effusion     Gastrointestinal: Negative for abdominal pain, blood in stool, constipation, diarrhea, nausea and vomiting  Colonoscopy 04/2018 Two benign polyps  Mild diverticulosis left-sided colon  Endocrine: Negative for polydipsia and polyuria         09/2018 DEXA scan T-score -1 5 left femoral neck  On vitamin D supplement   Genitourinary: Negative for difficulty urinating  Musculoskeletal: Negative for arthralgias and myalgias  Bilateral shoulder pain symptom relief with Tylenol Arthritis and Tramadol   07/2019 x-rays right shoulder mild degenerative arthritis right AC joint  Left shoulder mild degenerative changes left AC joint  08/2019 bilateral shoulder intra-articular steroid injections via fluoroscopy with symptomatic relief  10/2018 s/p right TKR  Skin: Negative for rash  Neurological: Negative for dizziness and headaches  RLS on Requip   Hematological: Negative for adenopathy  Does not bruise/bleed easily  Psychiatric/Behavioral: Negative for dysphoric mood and sleep disturbance           Objective:      /72   Pulse 78   Temp 97 9 °F (36 6 °C)   Resp 16   Ht 5' 2" (1 575 m)   Wt 69 4 kg (153 lb)   LMP 02/01/1990 (Within Weeks)   BMI 27 98 kg/m²       BP Readings from Last 3 Encounters:   09/08/20 116/72   07/31/20 128/64   06/16/20 121/70       Wt Readings from Last 3 Encounters:   09/08/20 69 4 kg (153 lb)   07/31/20 69 9 kg (154 lb)   06/16/20 68 5 kg (151 lb)        Physical Exam  Vitals signs and nursing note reviewed  Constitutional:       General: She is not in acute distress  Appearance: She is well-developed  HENT:      Mouth/Throat:      Mouth: No oral lesions  Dentition: Normal dentition  Eyes:      General: No scleral icterus  Conjunctiva/sclera: Conjunctivae normal       Pupils: Pupils are equal, round, and reactive to light  Neck:      Thyroid: No thyroid mass or thyromegaly  Vascular: No carotid bruit or JVD  Trachea: No tracheal deviation  Cardiovascular:      Rate and Rhythm: Normal rate and regular rhythm  Heart sounds: Normal heart sounds  No murmur  No gallop  Pulmonary:      Effort: Pulmonary effort is normal  No respiratory distress  Breath sounds: Normal breath sounds  No wheezing or rales  Abdominal:      General: Bowel sounds are normal  There is no distension or abdominal bruit  Palpations: Abdomen is soft  There is no mass  Tenderness: There is no abdominal tenderness  There is no guarding or rebound  Musculoskeletal: Normal range of motion  General: No deformity  Lymphadenopathy:      Cervical: No cervical adenopathy  Skin:     Findings: No rash  Nails: There is no clubbing  Neurological:      Mental Status: She is alert and oriented to person, place, and time  Cranial Nerves: No cranial nerve deficit        Deep Tendon Reflexes: Reflexes normal    Psychiatric:         Mood and Affect: Mood normal

## 2020-09-11 ENCOUNTER — HOSPITAL ENCOUNTER (OUTPATIENT)
Dept: RADIOLOGY | Age: 75
Discharge: HOME | End: 2020-09-11
Payer: MEDICARE

## 2020-09-11 DIAGNOSIS — M51.34 THORACIC DEGENERATIVE DISC DISEASE: ICD-10-CM

## 2020-09-11 PROCEDURE — G1004 CDSM NDSC: HCPCS

## 2020-09-11 PROCEDURE — 72146 MRI CHEST SPINE W/O DYE: CPT

## 2020-09-14 ENCOUNTER — TELEPHONE (OUTPATIENT)
Dept: FAMILY MEDICINE CLINIC | Facility: CLINIC | Age: 75
End: 2020-09-14

## 2020-09-15 ENCOUNTER — OFFICE VISIT (OUTPATIENT)
Dept: OBGYN CLINIC | Facility: CLINIC | Age: 75
End: 2020-09-15
Payer: MEDICARE

## 2020-09-15 VITALS
HEIGHT: 62 IN | BODY MASS INDEX: 27.23 KG/M2 | SYSTOLIC BLOOD PRESSURE: 126 MMHG | DIASTOLIC BLOOD PRESSURE: 80 MMHG | WEIGHT: 148 LBS

## 2020-09-15 DIAGNOSIS — Z12.31 ENCOUNTER FOR SCREENING MAMMOGRAM FOR BREAST CANCER: ICD-10-CM

## 2020-09-15 DIAGNOSIS — Z78.0 POSTMENOPAUSAL: Primary | ICD-10-CM

## 2020-09-15 DIAGNOSIS — N95.2 VAGINAL ATROPHY: ICD-10-CM

## 2020-09-15 PROCEDURE — G0101 CA SCREEN;PELVIC/BREAST EXAM: HCPCS | Performed by: PHYSICIAN ASSISTANT

## 2020-09-15 NOTE — PROGRESS NOTES
Patient is here for follow up visit  Patient has no bleeding or pelvic pain  No breast concerns and B&B ok  Patient is not due for a pap smear at this visit      10/4/19 Normal Mammo

## 2020-09-15 NOTE — PROGRESS NOTES
Assessment/Plan:    No problem-specific Assessment & Plan notes found for this encounter  Diagnoses and all orders for this visit:    Postmenopausal    Vaginal atrophy    Encounter for screening mammogram for breast cancer  -     Mammo screening bilateral w cad; Future          Subjective:      Patient ID: Katty Jimenez is a 67 y o  female  Pt presents for f/u atrophy today today--  She has no complaints  She has no bleeding or pelvic pain  Bowel and bladder are regular  Colonoscopy--17  No breast concerns today  Last mammo--10/19    No pap today  rx mammo      The following portions of the patient's history were reviewed and updated as appropriate: allergies, current medications, past family history, past medical history, past social history, past surgical history and problem list     Review of Systems   Constitutional: Negative for chills, fever and unexpected weight change  Gastrointestinal: Negative for abdominal pain, blood in stool, constipation and diarrhea  Genitourinary: Negative  Objective:      /80   Ht 5' 2" (1 575 m)   Wt 67 1 kg (148 lb)   LMP 02/01/1990 (Within Weeks)   BMI 27 07 kg/m²          Physical Exam  Vitals signs and nursing note reviewed  Constitutional:       Appearance: She is well-developed  HENT:      Head: Normocephalic and atraumatic  Neck:      Musculoskeletal: Normal range of motion  Chest:      Breasts:         Right: No inverted nipple, mass, nipple discharge or skin change  Left: No inverted nipple, mass, nipple discharge or skin change  Abdominal:      Palpations: Abdomen is soft  Genitourinary:     Exam position: Supine  Labia:         Right: No rash, tenderness or lesion  Left: No rash, tenderness or lesion  Vagina: Normal       Cervix: No cervical motion tenderness, discharge or friability  Adnexa:         Right: No mass, tenderness or fullness  Left: No mass, tenderness or fullness  Rectum: Normal       Comments: Atrophy noted  Lymphadenopathy:      Lower Body: No right inguinal adenopathy  No left inguinal adenopathy

## 2020-09-16 ENCOUNTER — TELEPHONE (OUTPATIENT)
Dept: FAMILY MEDICINE CLINIC | Facility: CLINIC | Age: 75
End: 2020-09-16

## 2020-09-16 DIAGNOSIS — E04.2 MULTIPLE THYROID NODULES: Primary | ICD-10-CM

## 2020-09-16 NOTE — TELEPHONE ENCOUNTER
I called and spoke with patient re thoracic spine MRI  Incidental finding thyroid nodules plan-schedule thyroid ultrasound      Procedure: Mri Thoracic Spine Wo Contrast    Result Date: 9/14/2020  Narrative: MRI THORACIC SPINE WITHOUT CONTRAST INDICATION: M51 34: Other intervertebral disc degeneration, thoracic region  COMPARISON:  X-ray 2/10/2020 TECHNIQUE:  Sagittal T1, sagittal T2, sagittal inversion recovery, axial T2,  axial 2D MERGE  IMAGE QUALITY: Diagnostic  FINDINGS: ALIGNMENT: Minor left convex mid dorsal scoliosis  Preservation of thoracic kyphosis  No indication of fracture or subluxation  MARROW SIGNAL:  Normal marrow signal is identified within the visualized bony structures  No discrete marrow lesion  THORACIC CORD: Normal signal within the thoracic cord  Conus terminates at the L1 level and appears intrinsically normal  PARAVERTEBRAL SOFT TISSUES:  Normal    Incidental 1 7 cm nodule in the right lobe of the thyroid, series 6 image 13  Is also enlargement of the left lobe of the thyroid descending into the superior mediastinum  Identified on this MR  According to guidelines published in the February 2015 white paper on incidental thyroid nodules in the Journal of the [employer] of Radiology [facility]), Because the nodule(s) are greater than 1 5 cm in size, further characterization with thyroid ultrasound is recommended  THORACIC DEGENERATIVE CHANGE: No disc herniation, canal stenosis or foraminal narrowing  No compressive degenerative changes  Impression: Multilevel spondylosis and osteoarthritis without evidence for cord or nerve root compression  Mild left convex mid dorsal scoliosis  Bilateral thyroid nodules, ultrasonography suggested  The study was marked in EPIC for significant and 2 month follow-up notification   Workstation performed: [id number]

## 2020-09-18 ENCOUNTER — TRANSCRIBE ORDERS (OUTPATIENT)
Dept: ADMINISTRATIVE | Facility: HOSPITAL | Age: 75
End: 2020-09-18

## 2020-09-18 DIAGNOSIS — Z12.31 ENCOUNTER FOR SCREENING MAMMOGRAM FOR MALIGNANT NEOPLASM OF BREAST: Primary | ICD-10-CM

## 2020-09-24 ENCOUNTER — TELEPHONE (OUTPATIENT)
Dept: CARDIOLOGY CLINIC | Facility: CLINIC | Age: 75
End: 2020-09-24

## 2020-09-24 DIAGNOSIS — I48.0 PAF (PAROXYSMAL ATRIAL FIBRILLATION) (HCC): ICD-10-CM

## 2020-09-24 DIAGNOSIS — R00.2 PALPITATIONS: ICD-10-CM

## 2020-09-24 DIAGNOSIS — I48.91 ATRIAL FIBRILLATION, UNSPECIFIED TYPE (HCC): ICD-10-CM

## 2020-09-24 DIAGNOSIS — I48.0 PAROXYSMAL ATRIAL FIBRILLATION (HCC): Primary | ICD-10-CM

## 2020-09-24 RX ORDER — SOTALOL HYDROCHLORIDE 120 MG/1
120 TABLET ORAL EVERY 12 HOURS
Qty: 180 TABLET | Refills: 3 | Status: SHIPPED | OUTPATIENT
Start: 2020-09-24 | End: 2020-12-21 | Stop reason: SDUPTHER

## 2020-09-24 NOTE — TELEPHONE ENCOUNTER
Patient states she takes 160 mg BID of Sotalol  Not 120 mg BID   She says it was changed a  Long time ago and has been taking like this for a long time  Last I see was 11/29/2018 she was told to increase it to 120 mg BID  At the time she had 80 mg tablets  Ok to refill at 160 mg BID? Zio patch being ordered on patient

## 2020-09-24 NOTE — TELEPHONE ENCOUNTER
Patient was originally on 80 mg BID then in 9/15/2018 hospitalization it was increased to 120 mg BID which would have been 1 5 tablets with a 80 mg tablet  I believe what happened was her tablet size got changed from an 80 mg tablet to a 120 mg tablet   But she just continued at 1 5 tablets not realizing the tablet size changed

## 2020-09-24 NOTE — TELEPHONE ENCOUNTER
Spoke with pt, MD instructions given  Pt took her HR 11:30am 82 and 12:30 60  I will order Zio patch for pt

## 2020-09-24 NOTE — TELEPHONE ENCOUNTER
Pt called stating that she was out of her Sotalol 120mg every 4 hrs,X4 days  She restarted this last night  Pt reports having rapid heart rate intermittently during  Exertion  She will have at rest occasionally  She feels lightheaded and dizzy  BP 96/72 today  However, she did not record her pulse  I did advise she starts to take note of her HR and the time frame she experiences the rapid heart rate and how long until she notices it returns to normal rate      Meds:  Amlodipine 5mg daily  Apixaban 5mg BID  Losartan 100mg daily

## 2020-09-25 ENCOUNTER — HOSPITAL ENCOUNTER (OUTPATIENT)
Dept: RADIOLOGY | Facility: MEDICAL CENTER | Age: 75
Discharge: HOME | End: 2020-09-25
Payer: MEDICARE

## 2020-09-25 DIAGNOSIS — E04.2 MULTIPLE THYROID NODULES: ICD-10-CM

## 2020-09-25 PROCEDURE — 76536 US EXAM OF HEAD AND NECK: CPT

## 2020-09-28 ENCOUNTER — TELEPHONE (OUTPATIENT)
Dept: FAMILY MEDICINE CLINIC | Facility: CLINIC | Age: 75
End: 2020-09-28

## 2020-10-02 ENCOUNTER — TELEPHONE (OUTPATIENT)
Dept: FAMILY MEDICINE CLINIC | Facility: CLINIC | Age: 75
End: 2020-10-02

## 2020-10-02 DIAGNOSIS — E04.1 THYROID NODULE: Primary | ICD-10-CM

## 2020-10-02 DIAGNOSIS — G25.81 RLS (RESTLESS LEGS SYNDROME): ICD-10-CM

## 2020-10-02 RX ORDER — ROPINIROLE 0.25 MG/1
TABLET, FILM COATED ORAL
Qty: 90 TABLET | Refills: 3 | Status: SHIPPED | OUTPATIENT
Start: 2020-10-02 | End: 2021-08-24

## 2020-10-09 DIAGNOSIS — G47.01 INSOMNIA DUE TO MEDICAL CONDITION: ICD-10-CM

## 2020-10-09 RX ORDER — ZOLPIDEM TARTRATE 10 MG/1
10 TABLET ORAL
Qty: 90 TABLET | Refills: 1 | Status: SHIPPED | OUTPATIENT
Start: 2020-10-09 | End: 2021-03-26

## 2020-10-14 ENCOUNTER — HOSPITAL ENCOUNTER (OUTPATIENT)
Dept: RADIOLOGY | Facility: HOSPITAL | Age: 75
Discharge: HOME | End: 2020-10-14
Admitting: RADIOLOGY
Payer: MEDICARE

## 2020-10-14 DIAGNOSIS — E04.1 THYROID NODULE: ICD-10-CM

## 2020-10-14 PROCEDURE — 88172 CYTP DX EVAL FNA 1ST EA SITE: CPT | Performed by: PATHOLOGY

## 2020-10-14 PROCEDURE — 88173 CYTOPATH EVAL FNA REPORT: CPT | Performed by: PATHOLOGY

## 2020-10-14 PROCEDURE — 10005 FNA BX W/US GDN 1ST LES: CPT

## 2020-10-14 RX ORDER — LIDOCAINE HYDROCHLORIDE 10 MG/ML
4 INJECTION, SOLUTION EPIDURAL; INFILTRATION; INTRACAUDAL; PERINEURAL ONCE
Status: COMPLETED | OUTPATIENT
Start: 2020-10-14 | End: 2020-10-14

## 2020-10-14 RX ADMIN — LIDOCAINE HYDROCHLORIDE 4 ML: 10 INJECTION, SOLUTION EPIDURAL; INFILTRATION; INTRACAUDAL; PERINEURAL at 11:45

## 2020-10-15 ENCOUNTER — CLINICAL SUPPORT (OUTPATIENT)
Dept: CARDIOLOGY CLINIC | Facility: CLINIC | Age: 75
End: 2020-10-15
Payer: MEDICARE

## 2020-10-15 DIAGNOSIS — I48.0 PAROXYSMAL ATRIAL FIBRILLATION (HCC): ICD-10-CM

## 2020-10-15 DIAGNOSIS — I48.0 PAF (PAROXYSMAL ATRIAL FIBRILLATION) (HCC): ICD-10-CM

## 2020-10-15 DIAGNOSIS — I48.91 ATRIAL FIBRILLATION, UNSPECIFIED TYPE (HCC): ICD-10-CM

## 2020-10-15 DIAGNOSIS — R00.2 PALPITATIONS: ICD-10-CM

## 2020-10-15 PROCEDURE — 0298T PR EXT ECG > 48HR TO 21 DAY REVIEW AND INTERPRETATN: CPT | Performed by: INTERNAL MEDICINE

## 2020-10-21 ENCOUNTER — TELEPHONE (OUTPATIENT)
Dept: FAMILY MEDICINE CLINIC | Facility: CLINIC | Age: 75
End: 2020-10-21

## 2020-10-21 DIAGNOSIS — D49.7 FOLLICULAR NEOPLASM OF THYROID: Primary | ICD-10-CM

## 2020-11-03 ENCOUNTER — TELEMEDICINE (OUTPATIENT)
Dept: FAMILY MEDICINE CLINIC | Facility: CLINIC | Age: 75
End: 2020-11-03
Payer: MEDICARE

## 2020-11-03 DIAGNOSIS — J06.9 VIRAL URI WITH COUGH: Primary | ICD-10-CM

## 2020-11-03 PROCEDURE — 99441 PR PHYS/QHP TELEPHONE EVALUATION 5-10 MIN: CPT | Performed by: FAMILY MEDICINE

## 2020-11-09 ENCOUNTER — TELEPHONE (OUTPATIENT)
Dept: CARDIOLOGY CLINIC | Facility: CLINIC | Age: 75
End: 2020-11-09

## 2020-11-10 PROBLEM — E04.1 THYROID NODULE: Status: ACTIVE | Noted: 2020-11-10

## 2020-11-10 PROBLEM — J31.0 CHRONIC RHINITIS: Status: ACTIVE | Noted: 2020-11-10

## 2020-11-13 ENCOUNTER — TELEPHONE (OUTPATIENT)
Dept: FAMILY MEDICINE CLINIC | Facility: CLINIC | Age: 75
End: 2020-11-13

## 2020-11-13 DIAGNOSIS — M51.34 THORACIC DEGENERATIVE DISC DISEASE: Primary | ICD-10-CM

## 2020-11-13 DIAGNOSIS — G62.9 PERIPHERAL POLYNEUROPATHY: ICD-10-CM

## 2020-12-07 ENCOUNTER — TELEPHONE (OUTPATIENT)
Dept: PAIN MEDICINE | Facility: CLINIC | Age: 75
End: 2020-12-07

## 2020-12-09 ENCOUNTER — OFFICE VISIT (OUTPATIENT)
Dept: CARDIOLOGY CLINIC | Facility: CLINIC | Age: 75
End: 2020-12-09
Payer: MEDICARE

## 2020-12-09 VITALS
WEIGHT: 152 LBS | HEIGHT: 62 IN | SYSTOLIC BLOOD PRESSURE: 140 MMHG | HEART RATE: 53 BPM | BODY MASS INDEX: 27.97 KG/M2 | DIASTOLIC BLOOD PRESSURE: 74 MMHG

## 2020-12-09 DIAGNOSIS — E04.1 THYROID NODULE: ICD-10-CM

## 2020-12-09 DIAGNOSIS — E78.2 MIXED HYPERLIPIDEMIA: ICD-10-CM

## 2020-12-09 DIAGNOSIS — I48.0 PAROXYSMAL ATRIAL FIBRILLATION (HCC): ICD-10-CM

## 2020-12-09 DIAGNOSIS — Z01.810 PREOP CARDIOVASCULAR EXAM: Primary | ICD-10-CM

## 2020-12-09 DIAGNOSIS — I10 ESSENTIAL HYPERTENSION: ICD-10-CM

## 2020-12-09 PROCEDURE — 93000 ELECTROCARDIOGRAM COMPLETE: CPT | Performed by: INTERNAL MEDICINE

## 2020-12-09 PROCEDURE — U0003 INFECTIOUS AGENT DETECTION BY NUCLEIC ACID (DNA OR RNA); SEVERE ACUTE RESPIRATORY SYNDROME CORONAVIRUS 2 (SARS-COV-2) (CORONAVIRUS DISEASE [COVID-19]), AMPLIFIED PROBE TECHNIQUE, MAKING USE OF HIGH THROUGHPUT TECHNOLOGIES AS DESCRIBED BY CMS-2020-01-R: HCPCS | Performed by: SPECIALIST

## 2020-12-09 PROCEDURE — 99214 OFFICE O/P EST MOD 30 MIN: CPT | Performed by: INTERNAL MEDICINE

## 2020-12-10 ENCOUNTER — APPOINTMENT (OUTPATIENT)
Dept: LAB | Facility: MEDICAL CENTER | Age: 75
End: 2020-12-10
Payer: MEDICARE

## 2020-12-10 ENCOUNTER — TELEPHONE (OUTPATIENT)
Dept: CARDIOLOGY CLINIC | Facility: CLINIC | Age: 75
End: 2020-12-10

## 2020-12-10 DIAGNOSIS — E04.1 THYROID NODULE: ICD-10-CM

## 2020-12-10 LAB
ANION GAP SERPL CALCULATED.3IONS-SCNC: 4 MMOL/L (ref 4–13)
BASOPHILS # BLD AUTO: 0.05 THOUSANDS/ΜL (ref 0–0.1)
BASOPHILS NFR BLD AUTO: 1 % (ref 0–1)
BUN SERPL-MCNC: 23 MG/DL (ref 5–25)
CALCIUM SERPL-MCNC: 9.7 MG/DL (ref 8.3–10.1)
CHLORIDE SERPL-SCNC: 106 MMOL/L (ref 100–108)
CO2 SERPL-SCNC: 30 MMOL/L (ref 21–32)
CREAT SERPL-MCNC: 1.11 MG/DL (ref 0.6–1.3)
EOSINOPHIL # BLD AUTO: 0.21 THOUSAND/ΜL (ref 0–0.61)
EOSINOPHIL NFR BLD AUTO: 4 % (ref 0–6)
ERYTHROCYTE [DISTWIDTH] IN BLOOD BY AUTOMATED COUNT: 13 % (ref 11.6–15.1)
GFR SERPL CREATININE-BSD FRML MDRD: 48 ML/MIN/1.73SQ M
GLUCOSE SERPL-MCNC: 127 MG/DL (ref 65–140)
HCT VFR BLD AUTO: 37.5 % (ref 34.8–46.1)
HGB BLD-MCNC: 12 G/DL (ref 11.5–15.4)
IMM GRANULOCYTES # BLD AUTO: 0.01 THOUSAND/UL (ref 0–0.2)
IMM GRANULOCYTES NFR BLD AUTO: 0 % (ref 0–2)
LYMPHOCYTES # BLD AUTO: 2.08 THOUSANDS/ΜL (ref 0.6–4.47)
LYMPHOCYTES NFR BLD AUTO: 35 % (ref 14–44)
MCH RBC QN AUTO: 31.7 PG (ref 26.8–34.3)
MCHC RBC AUTO-ENTMCNC: 32 G/DL (ref 31.4–37.4)
MCV RBC AUTO: 99 FL (ref 82–98)
MONOCYTES # BLD AUTO: 0.7 THOUSAND/ΜL (ref 0.17–1.22)
MONOCYTES NFR BLD AUTO: 12 % (ref 4–12)
NEUTROPHILS # BLD AUTO: 2.86 THOUSANDS/ΜL (ref 1.85–7.62)
NEUTS SEG NFR BLD AUTO: 48 % (ref 43–75)
NRBC BLD AUTO-RTO: 0 /100 WBCS
PLATELET # BLD AUTO: 238 THOUSANDS/UL (ref 149–390)
PMV BLD AUTO: 10.9 FL (ref 8.9–12.7)
POTASSIUM SERPL-SCNC: 4.2 MMOL/L (ref 3.5–5.3)
RBC # BLD AUTO: 3.79 MILLION/UL (ref 3.81–5.12)
SARS-COV-2 RNA SPEC QL NAA+PROBE: NOT DETECTED
SODIUM SERPL-SCNC: 140 MMOL/L (ref 136–145)
WBC # BLD AUTO: 5.91 THOUSAND/UL (ref 4.31–10.16)

## 2020-12-10 PROCEDURE — 80048 BASIC METABOLIC PNL TOTAL CA: CPT

## 2020-12-10 PROCEDURE — 36415 COLL VENOUS BLD VENIPUNCTURE: CPT

## 2020-12-10 PROCEDURE — 85025 COMPLETE CBC W/AUTO DIFF WBC: CPT

## 2020-12-10 NOTE — PRE-PROCEDURE INSTRUCTIONS
Pre-Surgery Instructions:   Medication Instructions    acetaminophen (TYLENOL) 650 mg CR tablet Instructed ok to continue taking as ordered   amLODIPine (NORVASC) 5 mg tablet Instructed to continue taking up to and oncluding DOS   apixaban (ELIQUIS) 5 mg Instructed per cardiology Dr Kalen Lemus to stop 3 days prior to surgery therefore last dose will be taken 12/12 pm    ascorbic acid (VITAMIN C) 500 mg tablet Instructed patient per Anesthesia Guidelines   Cholecalciferol ([employer]S VITAMIN D) 2000 UNITS CAPS Instructed patient per Anesthesia Guidelines   Chromium Picolinate (CHROMIUM PICOLATE PO) Instructed patient per Anesthesia Guidelines   ezetimibe (ZETIA) 10 mg tablet Instructed to continue taking up to and including DOS    famotidine (PEPCID) 20 mg tablet Instructed to continue taking up to and including DOS   gabapentin (NEURONTIN) 300 mg capsule Instructed to continue taking up to and including DOS   losartan (COZAAR) 100 MG tablet Instructed to continue taking up to but not DOS   rOPINIRole (REQUIP) 0 25 mg tablet Instructed to continue taking as ordered   sotalol (BETAPACE) 120 mg tablet Instructed to continue taking up to and including DOS   traMADol (ULTRAM) 50 mg tablet Instructed to continue taking as ordered   TURMERIC PO Instructed patient per Anesthesia Guidelines   zolpidem (AMBIEN) 10 mg tablet Instructed ok to continue taking as ordered  Med list reviewed as above  Pt instructed that per anesthesia guidelines she should stop vitamins and supplements x 1 week, avoid NSAID's , and ok to take Tylenol as needed  Eliquis instructions given per cardiology  Preop and bathing instructions reviewed  Pt has CHG surgical soap and reinforced preop showering instructions  Pt verbalizes understanding and compliance of all instructions

## 2020-12-11 ENCOUNTER — TELEPHONE (OUTPATIENT)
Dept: PAIN MEDICINE | Facility: MEDICAL CENTER | Age: 75
End: 2020-12-11

## 2020-12-11 NOTE — TELEPHONE ENCOUNTER
FQ, see below, do you concur? I s/w pt and she said she will be having surgery with ENT and wanted to know if she needed to stop her gabapentin  I told pt that would be a question for the surgeon  Pt said she called them and they told her to ask our office  I told pt she would stop the gabapentin only if the surgeon needed it held before the surgery and that would be something the surgeon's office would advise her to do  I told pt to call her surgeon to discuss further  Pt is scheduled for thyroid sx

## 2020-12-11 NOTE — TELEPHONE ENCOUNTER
Pt called stating that she   Would like to know if she should stop gabapentin before surgery   Pt can be reached at [phone]

## 2020-12-11 NOTE — TELEPHONE ENCOUNTER
I left detailed vm for pt that per FQ she should not the gabapentin  Pt only needs to c/b if she has further questions

## 2020-12-15 ENCOUNTER — ANESTHESIA EVENT (OUTPATIENT)
Dept: PERIOP | Facility: HOSPITAL | Age: 75
End: 2020-12-15
Payer: MEDICARE

## 2020-12-16 ENCOUNTER — HOSPITAL ENCOUNTER (OUTPATIENT)
Facility: HOSPITAL | Age: 75
Setting detail: OUTPATIENT SURGERY
Discharge: HOME | End: 2020-12-16
Attending: SPECIALIST | Admitting: SPECIALIST
Payer: MEDICARE

## 2020-12-16 ENCOUNTER — ANESTHESIA (OUTPATIENT)
Dept: PERIOP | Facility: HOSPITAL | Age: 75
End: 2020-12-16
Payer: MEDICARE

## 2020-12-16 VITALS
OXYGEN SATURATION: 94 % | WEIGHT: 152 LBS | BODY MASS INDEX: 27.97 KG/M2 | TEMPERATURE: 96.5 F | RESPIRATION RATE: 18 BRPM | HEIGHT: 62 IN | SYSTOLIC BLOOD PRESSURE: 108 MMHG | DIASTOLIC BLOOD PRESSURE: 52 MMHG | HEART RATE: 47 BPM

## 2020-12-16 VITALS — HEART RATE: 61 BPM

## 2020-12-16 DIAGNOSIS — E04.1 THYROID NODULE: Primary | ICD-10-CM

## 2020-12-16 DIAGNOSIS — I48.0 PAROXYSMAL ATRIAL FIBRILLATION (HCC): ICD-10-CM

## 2020-12-16 PROCEDURE — 88307 TISSUE EXAM BY PATHOLOGIST: CPT | Performed by: PATHOLOGY

## 2020-12-16 PROCEDURE — 60220 PARTIAL REMOVAL OF THYROID: CPT | Performed by: SPECIALIST

## 2020-12-16 RX ORDER — OXYCODONE HYDROCHLORIDE 5 MG/1
5 TABLET ORAL EVERY 4 HOURS PRN
Status: DISCONTINUED | OUTPATIENT
Start: 2020-12-16 | End: 2020-12-16 | Stop reason: HOSPADM

## 2020-12-16 RX ORDER — SUCCINYLCHOLINE/SOD CL,ISO/PF 100 MG/5ML
SYRINGE (ML) INTRAVENOUS AS NEEDED
Status: DISCONTINUED | OUTPATIENT
Start: 2020-12-16 | End: 2020-12-16

## 2020-12-16 RX ORDER — DEXAMETHASONE SODIUM PHOSPHATE 10 MG/ML
INJECTION, SOLUTION INTRAMUSCULAR; INTRAVENOUS AS NEEDED
Status: DISCONTINUED | OUTPATIENT
Start: 2020-12-16 | End: 2020-12-16

## 2020-12-16 RX ORDER — FENTANYL CITRATE 50 UG/ML
INJECTION, SOLUTION INTRAMUSCULAR; INTRAVENOUS AS NEEDED
Status: DISCONTINUED | OUTPATIENT
Start: 2020-12-16 | End: 2020-12-16

## 2020-12-16 RX ORDER — PROPOFOL 10 MG/ML
INJECTION, EMULSION INTRAVENOUS AS NEEDED
Status: DISCONTINUED | OUTPATIENT
Start: 2020-12-16 | End: 2020-12-16

## 2020-12-16 RX ORDER — SODIUM CHLORIDE, SODIUM LACTATE, POTASSIUM CHLORIDE, CALCIUM CHLORIDE 600; 310; 30; 20 MG/100ML; MG/100ML; MG/100ML; MG/100ML
INJECTION, SOLUTION INTRAVENOUS CONTINUOUS PRN
Status: DISCONTINUED | OUTPATIENT
Start: 2020-12-16 | End: 2020-12-16

## 2020-12-16 RX ORDER — ACETAMINOPHEN 325 MG/1
650 TABLET ORAL EVERY 6 HOURS PRN
Status: DISCONTINUED | OUTPATIENT
Start: 2020-12-16 | End: 2020-12-16 | Stop reason: HOSPADM

## 2020-12-16 RX ORDER — FENTANYL CITRATE/PF 50 MCG/ML
50 SYRINGE (ML) INJECTION
Status: DISCONTINUED | OUTPATIENT
Start: 2020-12-16 | End: 2020-12-16 | Stop reason: HOSPADM

## 2020-12-16 RX ORDER — LIDOCAINE HYDROCHLORIDE 10 MG/ML
INJECTION, SOLUTION EPIDURAL; INFILTRATION; INTRACAUDAL; PERINEURAL AS NEEDED
Status: DISCONTINUED | OUTPATIENT
Start: 2020-12-16 | End: 2020-12-16

## 2020-12-16 RX ORDER — LIDOCAINE HYDROCHLORIDE AND EPINEPHRINE 10; 10 MG/ML; UG/ML
INJECTION, SOLUTION INFILTRATION; PERINEURAL AS NEEDED
Status: DISCONTINUED | OUTPATIENT
Start: 2020-12-16 | End: 2020-12-16 | Stop reason: HOSPADM

## 2020-12-16 RX ORDER — MAGNESIUM HYDROXIDE 1200 MG/15ML
LIQUID ORAL AS NEEDED
Status: DISCONTINUED | OUTPATIENT
Start: 2020-12-16 | End: 2020-12-16 | Stop reason: HOSPADM

## 2020-12-16 RX ORDER — SENNOSIDES 8.6 MG
650 CAPSULE ORAL EVERY 6 HOURS PRN
Qty: 30 TABLET | Refills: 0
Start: 2020-12-16 | End: 2022-06-17

## 2020-12-16 RX ORDER — ONDANSETRON 2 MG/ML
4 INJECTION INTRAMUSCULAR; INTRAVENOUS ONCE AS NEEDED
Status: DISCONTINUED | OUTPATIENT
Start: 2020-12-16 | End: 2020-12-16 | Stop reason: HOSPADM

## 2020-12-16 RX ORDER — NEOSTIGMINE METHYLSULFATE 1 MG/ML
INJECTION INTRAVENOUS AS NEEDED
Status: DISCONTINUED | OUTPATIENT
Start: 2020-12-16 | End: 2020-12-16

## 2020-12-16 RX ORDER — ONDANSETRON 2 MG/ML
INJECTION INTRAMUSCULAR; INTRAVENOUS AS NEEDED
Status: DISCONTINUED | OUTPATIENT
Start: 2020-12-16 | End: 2020-12-16

## 2020-12-16 RX ORDER — MIDAZOLAM HYDROCHLORIDE 2 MG/2ML
INJECTION, SOLUTION INTRAMUSCULAR; INTRAVENOUS AS NEEDED
Status: DISCONTINUED | OUTPATIENT
Start: 2020-12-16 | End: 2020-12-16

## 2020-12-16 RX ORDER — ROCURONIUM BROMIDE 10 MG/ML
INJECTION, SOLUTION INTRAVENOUS AS NEEDED
Status: DISCONTINUED | OUTPATIENT
Start: 2020-12-16 | End: 2020-12-16

## 2020-12-16 RX ORDER — SODIUM CHLORIDE, SODIUM LACTATE, POTASSIUM CHLORIDE, CALCIUM CHLORIDE 600; 310; 30; 20 MG/100ML; MG/100ML; MG/100ML; MG/100ML
50 INJECTION, SOLUTION INTRAVENOUS CONTINUOUS
Status: DISCONTINUED | OUTPATIENT
Start: 2020-12-16 | End: 2020-12-16 | Stop reason: HOSPADM

## 2020-12-16 RX ORDER — GLYCOPYRROLATE 0.2 MG/ML
INJECTION INTRAMUSCULAR; INTRAVENOUS AS NEEDED
Status: DISCONTINUED | OUTPATIENT
Start: 2020-12-16 | End: 2020-12-16

## 2020-12-16 RX ORDER — EPHEDRINE SULFATE 50 MG/ML
INJECTION INTRAVENOUS AS NEEDED
Status: DISCONTINUED | OUTPATIENT
Start: 2020-12-16 | End: 2020-12-16

## 2020-12-16 RX ORDER — OXYCODONE HYDROCHLORIDE 5 MG/1
5 TABLET ORAL EVERY 6 HOURS PRN
Qty: 8 TABLET | Refills: 0 | Status: SHIPPED | OUTPATIENT
Start: 2020-12-16 | End: 2021-01-06 | Stop reason: ALTCHOICE

## 2020-12-16 RX ADMIN — FENTANYL CITRATE 50 MCG: 50 INJECTION, SOLUTION INTRAMUSCULAR; INTRAVENOUS at 09:30

## 2020-12-16 RX ADMIN — EPHEDRINE SULFATE 5 MG: 50 INJECTION, SOLUTION INTRAVENOUS at 09:37

## 2020-12-16 RX ADMIN — GLYCOPYRROLATE 0.2 MG: 0.2 INJECTION, SOLUTION INTRAMUSCULAR; INTRAVENOUS at 09:34

## 2020-12-16 RX ADMIN — ONDANSETRON 4 MG: 2 INJECTION INTRAMUSCULAR; INTRAVENOUS at 10:09

## 2020-12-16 RX ADMIN — PHENYLEPHRINE HYDROCHLORIDE 20 MCG/MIN: 10 INJECTION INTRAVENOUS at 09:40

## 2020-12-16 RX ADMIN — EPHEDRINE SULFATE 10 MG: 50 INJECTION, SOLUTION INTRAVENOUS at 10:08

## 2020-12-16 RX ADMIN — EPHEDRINE SULFATE 5 MG: 50 INJECTION, SOLUTION INTRAVENOUS at 09:30

## 2020-12-16 RX ADMIN — EPHEDRINE SULFATE 10 MG: 50 INJECTION, SOLUTION INTRAVENOUS at 09:55

## 2020-12-16 RX ADMIN — SODIUM CHLORIDE, SODIUM LACTATE, POTASSIUM CHLORIDE, AND CALCIUM CHLORIDE: .6; .31; .03; .02 INJECTION, SOLUTION INTRAVENOUS at 09:20

## 2020-12-16 RX ADMIN — LIDOCAINE HYDROCHLORIDE 50 MG: 10 INJECTION, SOLUTION EPIDURAL; INFILTRATION; INTRACAUDAL at 09:30

## 2020-12-16 RX ADMIN — EPHEDRINE SULFATE 10 MG: 50 INJECTION, SOLUTION INTRAVENOUS at 09:35

## 2020-12-16 RX ADMIN — MIDAZOLAM HYDROCHLORIDE 2 MG: 1 INJECTION, SOLUTION INTRAMUSCULAR; INTRAVENOUS at 09:20

## 2020-12-16 RX ADMIN — GLYCOPYRROLATE 0.6 MG: 0.2 INJECTION, SOLUTION INTRAMUSCULAR; INTRAVENOUS at 10:18

## 2020-12-16 RX ADMIN — DEXAMETHASONE SODIUM PHOSPHATE 8 MG: 10 INJECTION, SOLUTION INTRAMUSCULAR; INTRAVENOUS at 09:33

## 2020-12-16 RX ADMIN — PROPOFOL 170 MG: 10 INJECTION, EMULSION INTRAVENOUS at 09:30

## 2020-12-16 RX ADMIN — NEOSTIGMINE METHYLSULFATE 3 MG: 1 INJECTION INTRAVENOUS at 10:18

## 2020-12-16 RX ADMIN — ROCURONIUM BROMIDE 5 MG: 10 SOLUTION INTRAVENOUS at 09:30

## 2020-12-16 RX ADMIN — Medication 100 MG: at 09:30

## 2020-12-16 RX ADMIN — ROCURONIUM BROMIDE 25 MG: 10 SOLUTION INTRAVENOUS at 09:34

## 2020-12-16 RX ADMIN — FENTANYL CITRATE 25 MCG: 50 INJECTION, SOLUTION INTRAMUSCULAR; INTRAVENOUS at 10:22

## 2020-12-16 NOTE — DISCHARGE INSTRUCTIONS
Partial Thyroidectomy   WHAT YOU NEED TO KNOW:   A partial thyroidectomy is surgery to remove part of your thyroid gland  Your thyroid gland makes hormones that regulate your metabolism, body temperature, and heart rate  You may have a sore throat, hoarse voice, or difficulty swallowing after surgery  It is normal to have these problems for up to 6 months after a thyroidectomy  DISCHARGE INSTRUCTIONS:   Seek care immediately if:   · You have sudden tingling or muscle cramps in your face, arm, or leg  · You have muscle spasms in your legs and feet that do not go away  · Blood soaks through your bandage  · Your stitches come apart  · You have sudden swelling in your neck or difficulty swallowing  · You have trouble breathing  · You have a seizure  Contact your endocrinologist or surgeon if:   · You have a fever or chills  · You feel very tired and cold, gain weight for no reason, and your skin is very dry  · You vomit several times in a row  · Your incision is red, swollen, or draining pus  · You have new voice weakness or hoarseness, or it is getting worse  · You have questions or concerns about your condition or care  Medicines: You may  need any of the following:  · Prescription pain medicine  may be given  Ask your healthcare provider how to take this medicine safely  Some prescription pain medicines contain acetaminophen  Do not take other medicines that contain acetaminophen without talking to your healthcare provider  Too much acetaminophen may cause liver damage  Prescription pain medicine may cause constipation  Ask your healthcare provider how to prevent or treat constipation  · Thyroid medicine  may be needed daily to keep your thyroid hormone level steady  · Take your medicine as directed  Contact your healthcare provider if you think your medicine is not helping or if you have side effects  Tell him or her if you are allergic to any medicine  Keep a list of the medicines, vitamins, and herbs you take  Include the amounts, and when and why you take them  Bring the list or the pill bottles to follow-up visits  Carry your medicine list with you in case of an emergency  Follow up with your endocrinologist or surgeon as directed: You will need to return to have your wound checked and stitches removed  You may also need blood tests to monitor your calcium, parathyroid, and thyroid hormone levels  Write down your questions so you remember to ask them during your visits  Wound care:  Check the wound every day for signs of infection, such as redness, swelling, or pus  Carefully wash your skin near the incision wound area with soap and water  Dry the area and put on new, clean bandages as directed  Change your bandages when they get wet or dirty  You can use a mild body lotion to improve the scar  Supplements:  Ask your endocrinologist if you need to take calcium or vitamin D and how much to take  © Copyright [address] Information is for End User's use only and may not be sold, redistributed or otherwise used for commercial purposes  All illustrations and images included in CareNotes® are the copyrighted property of A D A M [employer] Inc  or [address] [place] [address]  The above information is an  only  It is not intended as medical advice for individual conditions or treatments  Talk to your doctor, nurse or pharmacist before following any medical regimen to see if it is safe and effective for you  Call Dr Betty Moore with any questions or concerns: office ; mobile  (urgent issues)

## 2020-12-16 NOTE — H&P
H&P Exam - ENT   James Ramirez 67 y o  female MRN: [id number]  Unit/Bed#: OR POOL Encounter: [id number]    Assessment/Plan     Assessment:  Left thyroid nodule, suspicious    Plan:  Left thyroid lobectomy, possible total thyroidectomy    History of Present Illness   HPI:  James Ramirez is a 67 y o  female who presents for scheduled surgery    Review of Systems   Constitutional: Negative for fever  Respiratory: Negative for cough and shortness of breath          Historical Information   Past Medical History:   Diagnosis Date    Actinic keratosis     last assessed - [id number]    Arthritis     Atrial fibrillation with rapid ventricular response (HCC)     last assessed - 26Apr2016    Basal cell carcinoma     Benign colon polyp     last assessed - 27Apr2015    Disease of thyroid gland     Effusion of knee joint right     Resolved - 19Apr2016    Esophageal reflux     Fibromyalgia     last assessed - 27Dec2017    Fibromyalgia, primary     GERD (gastroesophageal reflux disease)     Hypertension     Palpitations     last assessed - 30Apr2013    Peroneal tendonitis, right     last assessed - 02Oct2013    Right lumbar radiculopathy 3/17/2016    Thyroid nodule     Trochanteric bursitis of left hip 3/9/2018     Past Surgical History:   Procedure Laterality Date    CATARACT EXTRACTION Bilateral     COLONOSCOPY  03/2018    EYE SURGERY      HYSTERECTOMY      JOINT REPLACEMENT Left     knee    VT REVISE MEDIAN N/CARPAL TUNNEL SURG Right 11/14/2019    Procedure: RELEASE CARPAL TUNNEL;  Surgeon: Mu Deleon MD;  Location: [facility];  Service: Orthopedics    RECTAL POLYPECTOMY      REPLACEMENT TOTAL KNEE Left     last assessed - 27Apr2015    TONSILLECTOMY      TOTAL ABDOMINAL HYSTERECTOMY      TUBAL LIGATION      US GUIDED THYROID BIOPSY  10/14/2020     Social History   Social History     Substance and Sexual Activity   Alcohol Use Not Currently    Drinks per session: 1 or 2    Comment: occosional - every 3 months     Social History     Substance and Sexual Activity   Drug Use Never     Social History     Tobacco Use   Smoking Status Never Smoker   Smokeless Tobacco Never Used     E-Cigarette/Vaping    E-Cigarette Use Never User      E-Cigarette/Vaping Substances     Family History:   Family History   Problem Relation Age of Onset    Heart disease Mother     Diabetes Mother     Heart disease Father     Coronary artery disease Father     Stroke Father         cerebrovascular accident    Heart attack Father         myocardial infarction    Sudden death Father         scd    Other Family         Back disorder    Coronary artery disease Family     Neuropathy Family     Osteoporosis Family     No Known Problems Daughter     No Known Problems Maternal Grandmother     No Known Problems Maternal Grandfather     No Known Problems Paternal Grandmother     No Known Problems Paternal Grandfather     Cancer Maternal Uncle     Breast cancer Maternal Aunt 72    No Known Problems Son     No Known Problems Maternal Aunt     No Known Problems Maternal Aunt     No Known Problems Maternal Aunt     No Known Problems Paternal Aunt     No Known Problems Paternal Aunt     Anuerysm Neg Hx     Clotting disorder Neg Hx     Arrhythmia Neg Hx     Heart failure Neg Hx        Meds/Allergies   PTA meds:   Prior to Admission Medications   Prescriptions Last Dose Informant Patient Reported? Taking?    Cholecalciferol ([employer] VITAMIN D) 2000 UNITS CAPS Past Week at Unknown time Self Yes Yes   Sig: Take 2,000 Units by mouth 2 (two) times a day     Chromium Picolinate (CHROMIUM PICOLATE PO) Past Week at Unknown time Self Yes Yes   Sig: Take 1 tablet by mouth daily   TURMERIC PO Past Week at Unknown time Self Yes Yes   Sig: Take 1 capsule by mouth 2 (two) times a day   acetaminophen (TYLENOL) 650 mg CR tablet 12/15/2020 at Unknown time Self Yes Yes   Sig: Take 650 mg by mouth 2 (two) times a day   amLODIPine (NORVASC) 5 mg tablet 12/16/2020 at Unknown time Self No Yes   Sig: TAKE 1 TABLET BY MOUTH  DAILY   apixaban (ELIQUIS) 5 mg Past Week at Unknown time Self No Yes   Sig: Take 1 tablet (5 mg total) by mouth 2 (two) times a day   Patient taking differently: Take 5 mg by mouth 2 (two) times a day Will stop starting 12/13 for surgery   ascorbic acid (VITAMIN C) 500 mg tablet Past Week at Unknown time Self Yes Yes   Sig: Take 500 mg by mouth daily    ezetimibe (ZETIA) 10 mg tablet 12/15/2020 at Unknown time Self No Yes   Sig: TAKE 1 TABLET BY MOUTH  DAILY   famotidine (PEPCID) 20 mg tablet Unknown at Unknown time Self Yes No   Sig: Take 20 mg by mouth daily     gabapentin (NEURONTIN) 300 mg capsule 12/15/2020 at Unknown time Self No Yes   Sig: TAKE 2 CAPSULES BY MOUTH AT BEDTIME   losartan (COZAAR) 100 MG tablet 12/16/2020 at Unknown time Self No Yes   Sig: TAKE 1 TABLET BY MOUTH  DAILY   rOPINIRole (REQUIP) 0 25 mg tablet 12/15/2020 at Unknown time Self No Yes   Sig: TAKE 1 TABLET BY MOUTH  DAILY AT BEDTIME   sotalol (BETAPACE) 120 mg tablet 12/16/2020 at Unknown time Self No Yes   Sig: Take 1 tablet (120 mg total) by mouth every 12 (twelve) hours   traMADol (ULTRAM) 50 mg tablet 12/16/2020 at Unknown time Self No Yes   Sig: TAKE 1 TABLET BY MOUTH  TWICE DAILY AS NEEDED FOR  MODERATE PAIN   zolpidem (AMBIEN) 10 mg tablet 12/15/2020 at Unknown time Self No Yes   Sig: Take 1 tablet (10 mg total) by mouth daily at bedtime as needed for sleep      Facility-Administered Medications: None     Allergies   Allergen Reactions    Penicillins Other (See Comments)     As a child calcium deposit in the arm     Ace Inhibitors GI Intolerance     Did feel well on it       Objective   Vitals: Blood pressure 136/72, pulse (!) 52, temperature (!) 97 2 °F (36 2 °C), temperature source Temporal, resp  rate 18, height 5' 2" (1 575 m), weight 68 9 kg (152 lb), last menstrual period 02/01/1990, SpO2 98 %      No intake or output data in the 24 hours ending 12/16/20 0848    Invasive Devices     Peripheral Intravenous Line            Peripheral IV 12/16/20 Right Wrist less than 1 day                Physical Exam  Constitutional: Oriented to person, place, and time  Well-developed and well-nourished, no apparent distress, non-toxic appearance  Cooperative, able to hear and answer questions without difficulty  Voice: Normal voice quality  Head: Normocephalic, atraumatic  No scars, masses or lesions  Face: Symmetric, no edema, no sinus tenderness  Eyes: Vision grossly intact, extra-ocular movement intact  Right Ear: External ear normal     Left Ear: External ear normal     Nose: External nose well appearing  Oral cavity:  Mucosa moist, lips normal   Tongue mobile  Neck: Trachea midline  No masses or lesions  No palpable adenopathy  Pulmonary/Chest: Normal effort and rate  No respiratory distress  Clear to auscultation  Cardiac: Regular rate and rhythm  Musculoskeletal: Normal range of motion  Neurological: Cranial nerves 2-12 intact  Skin: Skin is warm and dry  Psychiatric: Normal mood and affect  Lab Results: I have personally reviewed pertinent lab results  Imaging: I have personally reviewed pertinent reports      EKG, Pathology, and Other Studies: FNA reviewed    Code Status: Prior  Advance Directive and Living Will:      Power of :    POLST:      None

## 2020-12-16 NOTE — OP NOTE
OPERATIVE REPORT  PATIENT NAME: Lulú Gonzales    :  1945  MRN: [id number]  Pt Location: AN OR ROOM 03    SURGERY DATE: 2020    Surgeon(s) and Role:     * Noelle Hendrix MD - Primary       Kenney Chambers DDS - Assistant    Preop Diagnosis:  Thyroid nodule [E04 1]    Post-Op Diagnosis Codes: * Thyroid nodule [E04 1]    Procedure(s):  THYROID LOBECTOMY (Left)    Specimen(s):  ID Type Source Tests Collected by Time Destination   1 : Left thyroid lobe and left nodule Tissue Thyroid, Left TISSUE EXAM Noelle Hendrix MD 2020 1009        Estimated Blood Loss:   Minimal    Drains:  None    Anesthesia Type:   General    Operative Indications:  Thyroid nodule [E04 1], Broadus 4 on FNA, suspicious via Afirma GSC  Operative Findings:  Large nodule in inferior aspect of left lobe  Complications:   None    Procedure and Technique:  Lulú Gonzales was positively identified and transferred onto the operating table in the supine position  Appropriate monitoring devices were put in place, anesthesia was induced and the patient was intubated without difficulty  A shoulder roll was placed, and the patients neck was examined  An appropriate site for skin incision was demarcated 1cm below the cricoid cartilage  Before proceeding further, the timeout process was completed  Local anesthesia in the form of 1% lidocaine with 1/100,000 epinephrine was then injected to the marked site  The patients neck was then prepped and draped in the usual sterile fashion  Skin incision was then made at the marked site in a transverse fashion using a 15 blade  Dissection continued through subcutaneous tissues and platysma using the 15 blade  Dissection then continued in midline in between strap musculature using DeBakey forceps and Bovie cautery  Strap muscles were then elevated off the underlying thyroid gland on the left side using Bovie cautery and blunt dissection    The strap muscles were then held in a retracted position using an [employer] retractor  Dissection then continued around the lobe of the thyroid gland, immediately adjacent to the capsule of the gland using bipolar cautery  This dissection was carried out along the superior aspect of the isthmus, extending along the medial aspect of the upper pole  The superior pole was retracted medially and inferiorly, and dissection continued around the lateral aspect of the lsuperior lobe  The superior pedicle was divided using bipolar cautery  This allowed further reflection and retraction of the gland medially, and dissection continued inferiorly, with care taken to remain as close as possible to the capsule of the gland to minimize risk of injury or sacrifice of parathyroid glands  With continued dissection, the recurrent laryngeal nerve was identified  The thyroid isthmus was then divided using  Bovie cautery  Remaining tissue attachments at [facility] ligament were then divided using Bipolar cautery, with care taken to limit extent of dissection at the point of entry of the recurrent laryngeal nerve  The left thyroid lobe was removed and examined, and no adherent parathyroid glands were noted  The specimen was then set aside to send to pathology for permanent section evaluation  The surgical site was irrigated with saline, and hemostasis was ensured using bipolar cautery  The surgical site was then closed in multiple layers  Strap muscles were re-approximated across midline using 3-0 Vicryl stitches in an interrupted fashion, and the same stitch was used in an interrupted fashion to reapproximate the plastysma and tissue in midline at the same plane  Skin was closed using a 4-0 Monocryl stitch in a running sub-cuticular fashion, followed by a Steristrip  Anesthesia was then reversed, and the patient was awakened, extubated and taken to the recovery room in stable condition   All counts were correct at the end of the case, and no complications were encountered        I was present for the entire procedure    Patient Disposition:  PACU  and extubated and stable    SIGNATURE: Ashley Mendoza MD  DATE: December 16, 2020  TIME: 10:33 AM

## 2020-12-21 ENCOUNTER — OFFICE VISIT (OUTPATIENT)
Dept: CARDIOLOGY CLINIC | Facility: CLINIC | Age: 75
End: 2020-12-21
Payer: MEDICARE

## 2020-12-21 ENCOUNTER — APPOINTMENT (OUTPATIENT)
Dept: LAB | Facility: CLINIC | Age: 75
End: 2020-12-21
Payer: MEDICARE

## 2020-12-21 VITALS
SYSTOLIC BLOOD PRESSURE: 132 MMHG | DIASTOLIC BLOOD PRESSURE: 82 MMHG | BODY MASS INDEX: 27.97 KG/M2 | HEART RATE: 112 BPM | HEIGHT: 62 IN | WEIGHT: 152 LBS | OXYGEN SATURATION: 94 %

## 2020-12-21 DIAGNOSIS — I48.0 PAROXYSMAL ATRIAL FIBRILLATION (HCC): Primary | ICD-10-CM

## 2020-12-21 DIAGNOSIS — I10 ESSENTIAL HYPERTENSION: ICD-10-CM

## 2020-12-21 DIAGNOSIS — I48.0 PAF (PAROXYSMAL ATRIAL FIBRILLATION) (HCC): ICD-10-CM

## 2020-12-21 DIAGNOSIS — E78.00 PURE HYPERCHOLESTEROLEMIA: ICD-10-CM

## 2020-12-21 LAB — TSH SERPL DL<=0.05 MIU/L-ACNC: 1.01 UIU/ML (ref 0.36–3.74)

## 2020-12-21 PROCEDURE — 84443 ASSAY THYROID STIM HORMONE: CPT | Performed by: NURSE PRACTITIONER

## 2020-12-21 PROCEDURE — 36415 COLL VENOUS BLD VENIPUNCTURE: CPT | Performed by: NURSE PRACTITIONER

## 2020-12-21 PROCEDURE — 99214 OFFICE O/P EST MOD 30 MIN: CPT | Performed by: NURSE PRACTITIONER

## 2020-12-21 PROCEDURE — 93000 ELECTROCARDIOGRAM COMPLETE: CPT | Performed by: NURSE PRACTITIONER

## 2020-12-21 RX ORDER — SOTALOL HYDROCHLORIDE 120 MG/1
120 TABLET ORAL EVERY 12 HOURS
Qty: 180 TABLET | Refills: 3 | Status: SHIPPED | OUTPATIENT
Start: 2020-12-21 | End: 2021-03-16 | Stop reason: HOSPADM

## 2020-12-21 RX ORDER — METOPROLOL SUCCINATE 25 MG/1
25 TABLET, EXTENDED RELEASE ORAL EVERY 12 HOURS
Qty: 60 TABLET | Refills: 1 | Status: SHIPPED | OUTPATIENT
Start: 2020-12-21 | End: 2021-01-05 | Stop reason: HOSPADM

## 2020-12-28 ENCOUNTER — OFFICE VISIT (OUTPATIENT)
Dept: LAB | Age: 75
End: 2020-12-28
Payer: MEDICARE

## 2020-12-28 DIAGNOSIS — I48.0 PAROXYSMAL ATRIAL FIBRILLATION (HCC): ICD-10-CM

## 2020-12-28 LAB
ATRIAL RATE: 53 BPM
P AXIS: 60 DEGREES
PR INTERVAL: 162 MS
QRS AXIS: 30 DEGREES
QRSD INTERVAL: 92 MS
QT INTERVAL: 470 MS
QTC INTERVAL: 441 MS
T WAVE AXIS: 5 DEGREES
VENTRICULAR RATE: 53 BPM

## 2020-12-28 PROCEDURE — 93005 ELECTROCARDIOGRAM TRACING: CPT

## 2021-01-03 ENCOUNTER — HOSPITAL ENCOUNTER (INPATIENT)
Facility: HOSPITAL | Age: 76
LOS: 2 days | Discharge: HOME | DRG: 309 | End: 2021-01-05
Attending: EMERGENCY MEDICINE | Admitting: HOSPITALIST
Payer: MEDICARE

## 2021-01-03 ENCOUNTER — APPOINTMENT (EMERGENCY)
Dept: RADIOLOGY | Facility: HOSPITAL | Age: 76
DRG: 309 | End: 2021-01-03
Payer: MEDICARE

## 2021-01-03 DIAGNOSIS — I48.0 PAROXYSMAL ATRIAL FIBRILLATION (HCC): ICD-10-CM

## 2021-01-03 DIAGNOSIS — I50.30 DIASTOLIC CHF (HCC): ICD-10-CM

## 2021-01-03 DIAGNOSIS — I10 ESSENTIAL HYPERTENSION: ICD-10-CM

## 2021-01-03 DIAGNOSIS — I48.91 ATRIAL FIBRILLATION WITH RVR (HCC): Primary | ICD-10-CM

## 2021-01-03 DIAGNOSIS — I48.91 ATRIAL FIBRILLATION WITH RAPID VENTRICULAR RESPONSE (HCC): ICD-10-CM

## 2021-01-03 DIAGNOSIS — G62.9 PERIPHERAL POLYNEUROPATHY: ICD-10-CM

## 2021-01-03 PROBLEM — I50.32 CHRONIC DIASTOLIC CHF (CONGESTIVE HEART FAILURE) (HCC): Status: ACTIVE | Noted: 2021-01-03

## 2021-01-03 LAB
ALBUMIN SERPL BCP-MCNC: 3.6 G/DL (ref 3.5–5)
ALP SERPL-CCNC: 65 U/L (ref 46–116)
ALT SERPL W P-5'-P-CCNC: 30 U/L (ref 12–78)
ANION GAP SERPL CALCULATED.3IONS-SCNC: 5 MMOL/L (ref 4–13)
AST SERPL W P-5'-P-CCNC: 20 U/L (ref 5–45)
ATRIAL RATE: 132 BPM
BASOPHILS # BLD AUTO: 0.05 THOUSANDS/ΜL (ref 0–0.1)
BASOPHILS NFR BLD AUTO: 1 % (ref 0–1)
BILIRUB SERPL-MCNC: 1.04 MG/DL (ref 0.2–1)
BUN SERPL-MCNC: 29 MG/DL (ref 5–25)
CALCIUM SERPL-MCNC: 9.9 MG/DL (ref 8.3–10.1)
CHLORIDE SERPL-SCNC: 104 MMOL/L (ref 100–108)
CO2 SERPL-SCNC: 28 MMOL/L (ref 21–32)
CREAT SERPL-MCNC: 1.02 MG/DL (ref 0.6–1.3)
EOSINOPHIL # BLD AUTO: 0.15 THOUSAND/ΜL (ref 0–0.61)
EOSINOPHIL NFR BLD AUTO: 3 % (ref 0–6)
ERYTHROCYTE [DISTWIDTH] IN BLOOD BY AUTOMATED COUNT: 13.3 % (ref 11.6–15.1)
FLUAV RNA RESP QL NAA+PROBE: NEGATIVE
FLUBV RNA RESP QL NAA+PROBE: NEGATIVE
GFR SERPL CREATININE-BSD FRML MDRD: 54 ML/MIN/1.73SQ M
GLUCOSE SERPL-MCNC: 141 MG/DL (ref 65–140)
HCT VFR BLD AUTO: 41.8 % (ref 34.8–46.1)
HGB BLD-MCNC: 13.7 G/DL (ref 11.5–15.4)
IMM GRANULOCYTES # BLD AUTO: 0.02 THOUSAND/UL (ref 0–0.2)
IMM GRANULOCYTES NFR BLD AUTO: 0 % (ref 0–2)
LYMPHOCYTES # BLD AUTO: 1.92 THOUSANDS/ΜL (ref 0.6–4.47)
LYMPHOCYTES NFR BLD AUTO: 32 % (ref 14–44)
MCH RBC QN AUTO: 32 PG (ref 26.8–34.3)
MCHC RBC AUTO-ENTMCNC: 32.8 G/DL (ref 31.4–37.4)
MCV RBC AUTO: 98 FL (ref 82–98)
MONOCYTES # BLD AUTO: 0.64 THOUSAND/ΜL (ref 0.17–1.22)
MONOCYTES NFR BLD AUTO: 11 % (ref 4–12)
NEUTROPHILS # BLD AUTO: 3.27 THOUSANDS/ΜL (ref 1.85–7.62)
NEUTS SEG NFR BLD AUTO: 53 % (ref 43–75)
NRBC BLD AUTO-RTO: 0 /100 WBCS
NT-PROBNP SERPL-MCNC: 2773 PG/ML
PLATELET # BLD AUTO: 264 THOUSANDS/UL (ref 149–390)
PMV BLD AUTO: 10.2 FL (ref 8.9–12.7)
POTASSIUM SERPL-SCNC: 4.4 MMOL/L (ref 3.5–5.3)
PROT SERPL-MCNC: 7.1 G/DL (ref 6.4–8.2)
QRS AXIS: 92 DEGREES
QRSD INTERVAL: 84 MS
QT INTERVAL: 356 MS
QTC INTERVAL: 473 MS
RBC # BLD AUTO: 4.28 MILLION/UL (ref 3.81–5.12)
RSV RNA RESP QL NAA+PROBE: NEGATIVE
SARS-COV-2 RNA RESP QL NAA+PROBE: NEGATIVE
SODIUM SERPL-SCNC: 137 MMOL/L (ref 136–145)
T WAVE AXIS: 217 DEGREES
TROPONIN I SERPL-MCNC: <0.02 NG/ML
TSH SERPL DL<=0.05 MIU/L-ACNC: 2.44 UIU/ML (ref 0.36–3.74)
VENTRICULAR RATE: 106 BPM
WBC # BLD AUTO: 6.05 THOUSAND/UL (ref 4.31–10.16)

## 2021-01-03 PROCEDURE — 84443 ASSAY THYROID STIM HORMONE: CPT | Performed by: INTERNAL MEDICINE

## 2021-01-03 PROCEDURE — 96374 THER/PROPH/DIAG INJ IV PUSH: CPT

## 2021-01-03 PROCEDURE — 93010 ELECTROCARDIOGRAM REPORT: CPT | Performed by: INTERNAL MEDICINE

## 2021-01-03 PROCEDURE — 80053 COMPREHEN METABOLIC PANEL: CPT | Performed by: EMERGENCY MEDICINE

## 2021-01-03 PROCEDURE — 83880 ASSAY OF NATRIURETIC PEPTIDE: CPT

## 2021-01-03 PROCEDURE — 84484 ASSAY OF TROPONIN QUANT: CPT | Performed by: EMERGENCY MEDICINE

## 2021-01-03 PROCEDURE — 99285 EMERGENCY DEPT VISIT HI MDM: CPT | Performed by: EMERGENCY MEDICINE

## 2021-01-03 PROCEDURE — 71045 X-RAY EXAM CHEST 1 VIEW: CPT

## 2021-01-03 PROCEDURE — 99223 1ST HOSP IP/OBS HIGH 75: CPT | Performed by: HOSPITALIST

## 2021-01-03 PROCEDURE — 0241U HB NFCT DS VIR RESP RNA 4 TRGT: CPT | Performed by: INTERNAL MEDICINE

## 2021-01-03 PROCEDURE — 36415 COLL VENOUS BLD VENIPUNCTURE: CPT | Performed by: EMERGENCY MEDICINE

## 2021-01-03 PROCEDURE — 85025 COMPLETE CBC W/AUTO DIFF WBC: CPT | Performed by: EMERGENCY MEDICINE

## 2021-01-03 PROCEDURE — 93005 ELECTROCARDIOGRAM TRACING: CPT

## 2021-01-03 PROCEDURE — 1124F ACP DISCUSS-NO DSCNMKR DOCD: CPT | Performed by: EMERGENCY MEDICINE

## 2021-01-03 PROCEDURE — 99285 EMERGENCY DEPT VISIT HI MDM: CPT

## 2021-01-03 PROCEDURE — 84484 ASSAY OF TROPONIN QUANT: CPT | Performed by: INTERNAL MEDICINE

## 2021-01-03 RX ORDER — GABAPENTIN 300 MG/1
600 CAPSULE ORAL
Status: DISCONTINUED | OUTPATIENT
Start: 2021-01-03 | End: 2021-01-05 | Stop reason: HOSPADM

## 2021-01-03 RX ORDER — ASCORBIC ACID 500 MG
500 TABLET ORAL DAILY
Status: DISCONTINUED | OUTPATIENT
Start: 2021-01-03 | End: 2021-01-05 | Stop reason: HOSPADM

## 2021-01-03 RX ORDER — METOPROLOL TARTRATE 5 MG/5ML
5 INJECTION INTRAVENOUS ONCE
Status: COMPLETED | OUTPATIENT
Start: 2021-01-03 | End: 2021-01-03

## 2021-01-03 RX ORDER — ROPINIROLE 0.25 MG/1
0.25 TABLET, FILM COATED ORAL
Status: DISCONTINUED | OUTPATIENT
Start: 2021-01-03 | End: 2021-01-05 | Stop reason: HOSPADM

## 2021-01-03 RX ORDER — METOPROLOL SUCCINATE 25 MG/1
25 TABLET, EXTENDED RELEASE ORAL EVERY 12 HOURS
Status: DISCONTINUED | OUTPATIENT
Start: 2021-01-03 | End: 2021-01-03

## 2021-01-03 RX ORDER — SOTALOL HYDROCHLORIDE 80 MG/1
120 TABLET ORAL EVERY 12 HOURS
Status: DISCONTINUED | OUTPATIENT
Start: 2021-01-03 | End: 2021-01-03

## 2021-01-03 RX ORDER — METOPROLOL SUCCINATE 50 MG/1
50 TABLET, EXTENDED RELEASE ORAL EVERY 12 HOURS
Status: DISCONTINUED | OUTPATIENT
Start: 2021-01-03 | End: 2021-01-03

## 2021-01-03 RX ORDER — ZOLPIDEM TARTRATE 5 MG/1
10 TABLET ORAL
Status: DISCONTINUED | OUTPATIENT
Start: 2021-01-03 | End: 2021-01-05 | Stop reason: HOSPADM

## 2021-01-03 RX ORDER — SOTALOL HYDROCHLORIDE 80 MG/1
120 TABLET ORAL EVERY 12 HOURS
Status: DISCONTINUED | OUTPATIENT
Start: 2021-01-03 | End: 2021-01-04

## 2021-01-03 RX ORDER — METOPROLOL TARTRATE 5 MG/5ML
5 INJECTION INTRAVENOUS EVERY 8 HOURS PRN
Status: DISCONTINUED | OUTPATIENT
Start: 2021-01-03 | End: 2021-01-05 | Stop reason: HOSPADM

## 2021-01-03 RX ORDER — LOSARTAN POTASSIUM 50 MG/1
100 TABLET ORAL DAILY
Status: DISCONTINUED | OUTPATIENT
Start: 2021-01-03 | End: 2021-01-04

## 2021-01-03 RX ORDER — EZETIMIBE 10 MG/1
10 TABLET ORAL DAILY
Status: DISCONTINUED | OUTPATIENT
Start: 2021-01-03 | End: 2021-01-05 | Stop reason: HOSPADM

## 2021-01-03 RX ORDER — FAMOTIDINE 20 MG/1
20 TABLET, FILM COATED ORAL DAILY
Status: DISCONTINUED | OUTPATIENT
Start: 2021-01-03 | End: 2021-01-05 | Stop reason: HOSPADM

## 2021-01-03 RX ORDER — METOPROLOL SUCCINATE 50 MG/1
50 TABLET, EXTENDED RELEASE ORAL EVERY 12 HOURS
Status: DISCONTINUED | OUTPATIENT
Start: 2021-01-03 | End: 2021-01-04

## 2021-01-03 RX ADMIN — METOROPROLOL TARTRATE 5 MG: 5 INJECTION, SOLUTION INTRAVENOUS at 19:07

## 2021-01-03 RX ADMIN — ZOLPIDEM TARTRATE 10 MG: 5 TABLET ORAL at 21:22

## 2021-01-03 RX ADMIN — METOPROLOL SUCCINATE 50 MG: 50 TABLET, EXTENDED RELEASE ORAL at 20:26

## 2021-01-03 RX ADMIN — ROPINIROLE HYDROCHLORIDE 0.25 MG: 0.25 TABLET, FILM COATED ORAL at 21:20

## 2021-01-03 RX ADMIN — GABAPENTIN 600 MG: 300 CAPSULE ORAL at 21:20

## 2021-01-03 RX ADMIN — METOPROLOL TARTRATE 5 MG: 5 INJECTION INTRAVENOUS at 10:43

## 2021-01-03 RX ADMIN — SOTALOL HYDROCHLORIDE 120 MG: 80 TABLET ORAL at 20:25

## 2021-01-03 RX ADMIN — APIXABAN 5 MG: 5 TABLET, FILM COATED ORAL at 17:43

## 2021-01-03 NOTE — ASSESSMENT & PLAN NOTE
Wt Readings from Last 3 Encounters:   12/21/20 68 9 kg (152 lb)   12/10/20 68 9 kg (152 lb)   12/09/20 68 9 kg (152 lb)       Appears compensated at this time    Elevated proBNP likely secondary to AFib RVR  Chest x-ray unremarkable  Last echo in 2018 with preserved EF and grade 2 diastolic dysfunction  Patient is not any diuretics at home    Plan:  · Will check echo  · Monitor daily weights and I&Os

## 2021-01-03 NOTE — ASSESSMENT & PLAN NOTE
Blood pressure is acceptable      · Continue sotalol, losartan and metoprolol (increased to 50 mg b i d  on admission)  · Monitor blood pressure

## 2021-01-03 NOTE — ED PROVIDER NOTES
History  Chief Complaint   Patient presents with    Palpitations     pt states "i think im in afib again " pt recently had thyroid sx  was in afib after but converted back to NSR     Patient is a 65-year-old female with a history of atrial fibrillation, hypertension, thyroid nodule status post thyroid lobectomy who presents with palpitations  Patient states that she has had palpitations since her thyroid surgery on 12/16/2020  She was started on metoprolol 25 mg b i d  By Cardiology after her surgery  Her Eliquis was also restarted after her surgery  She was supposed to discontinue her amlodipine but did not realize this until today  She continues to take Eliquis, metoprolol and sotalol as directed  However she had worsening palpitations this morning  She feels as though her heart is racing  She admits to mild chest pressure and mild exertional dyspnea  She denies any lower extremity edema or lower extremity pain  History provided by:  Patient  Palpitations  Palpitations quality:  Fast  Timing:  Intermittent  Progression:  Worsening  Chronicity:  Chronic  Ineffective treatments:  Beta blockers  Associated symptoms: chest pressure, cough and shortness of breath (walking up stairs)    Associated symptoms: no back pain, no chest pain, no diaphoresis, no dizziness, no lower extremity edema, no nausea and no vomiting        Prior to Admission Medications   Prescriptions Last Dose Informant Patient Reported? Taking?    Cholecalciferol ([employer] VITAMIN D) 2000 UNITS CAPS  Self Yes No   Sig: Take 2,000 Units by mouth 2 (two) times a day     Chromium Picolinate (CHROMIUM PICOLATE PO)  Self Yes No   Sig: Take 1 tablet by mouth daily   TURMERIC PO  Self Yes No   Sig: Take 1 capsule by mouth 2 (two) times a day   acetaminophen (TYLENOL) 650 mg CR tablet  Self No No   Sig: Take 1 tablet (650 mg total) by mouth every 6 (six) hours as needed for mild pain or moderate pain   apixaban (ELIQUIS) 5 mg  Self No No   Sig: Take 1 tablet (5 mg total) by mouth 2 (two) times a day   ascorbic acid (VITAMIN C) 500 mg tablet  Self Yes No   Sig: Take 500 mg by mouth daily    ezetimibe (ZETIA) 10 mg tablet  Self No No   Sig: TAKE 1 TABLET BY MOUTH  DAILY   famotidine (PEPCID) 20 mg tablet  Self Yes No   Sig: Take 20 mg by mouth daily     gabapentin (NEURONTIN) 300 mg capsule  Self No No   Sig: TAKE 2 CAPSULES BY MOUTH AT BEDTIME   losartan (COZAAR) 100 MG tablet  Self No No   Sig: TAKE 1 TABLET BY MOUTH  DAILY   metoprolol succinate (TOPROL-XL) 25 mg 24 hr tablet   No No   Sig: Take 1 tablet (25 mg total) by mouth every 12 (twelve) hours   oxyCODONE (ROXICODONE) 5 mg immediate release tablet  Self No No   Sig: Take 1 tablet (5 mg total) by mouth every 6 (six) hours as needed for severe painMax Daily Amount: 20 mg   rOPINIRole (REQUIP) 0 25 mg tablet  Self No No   Sig: TAKE 1 TABLET BY MOUTH  DAILY AT BEDTIME   sotalol (BETAPACE) 120 mg tablet   No No   Sig: Take 1 tablet (120 mg total) by mouth every 12 (twelve) hours   traMADol (ULTRAM) 50 mg tablet  Self No No   Sig: TAKE 1 TABLET BY MOUTH  TWICE DAILY AS NEEDED FOR  MODERATE PAIN   zolpidem (AMBIEN) 10 mg tablet  Self No No   Sig: Take 1 tablet (10 mg total) by mouth daily at bedtime as needed for sleep      Facility-Administered Medications: None       Past Medical History:   Diagnosis Date    Actinic keratosis     last assessed - [id number]    Arthritis     Atrial fibrillation with rapid ventricular response (HCC)     last assessed - [id number]    Basal cell carcinoma     Benign colon polyp     last assessed - [id number]    Disease of thyroid gland     Effusion of knee joint right     Resolved - [id number]    Esophageal reflux     Fibromyalgia     last assessed - [id number]    Fibromyalgia, primary     GERD (gastroesophageal reflux disease)     Hypertension     Palpitations     last assessed - [id number]    Peroneal tendonitis, right     last assessed - [id number]    Right lumbar radiculopathy 3/17/2016    Thyroid nodule     Trochanteric bursitis of left hip 3/9/2018       Past Surgical History:   Procedure Laterality Date    CATARACT EXTRACTION Bilateral     COLONOSCOPY  03/2018    EYE SURGERY      HYSTERECTOMY      JOINT REPLACEMENT Left     knee    PA REVISE MEDIAN N/CARPAL TUNNEL SURG Right 11/14/2019    Procedure: RELEASE CARPAL TUNNEL;  Surgeon: Ami Saez MD;  [facility]: [facility];  Service: Orthopedics    PA THYROID LOBECTOMY,UNILAT Left 12/16/2020    Procedure: Left THYROID LOBECTOMY;  Surgeon: Bonita Schilder, MD;  [facility];  Service: ENT    RECTAL POLYPECTOMY      REPLACEMENT TOTAL KNEE Left     last assessed - [id number]    TONSILLECTOMY      TOTAL ABDOMINAL HYSTERECTOMY      TUBAL LIGATION      US GUIDED THYROID BIOPSY  10/14/2020       Family History   Problem Relation Age of Onset    Heart disease Mother     Diabetes Mother     Heart disease Father     Coronary artery disease Father     Stroke Father         cerebrovascular accident    Heart attack Father         myocardial infarction    Sudden death Father         scd    Other Family         Back disorder    Coronary artery disease Family     Neuropathy Family     Osteoporosis Family     No Known Problems Daughter     No Known Problems Maternal Grandmother     No Known Problems Maternal Grandfather     No Known Problems Paternal Grandmother     No Known Problems Paternal Grandfather     Cancer Maternal Uncle     Breast cancer Maternal Aunt 72    No Known Problems Son     No Known Problems Maternal Aunt     No Known Problems Maternal Aunt     No Known Problems Maternal Aunt     No Known Problems Paternal Aunt     No Known Problems Paternal Aunt     Anuerysm Neg Hx     Clotting disorder Neg Hx     Arrhythmia Neg Hx     Heart failure Neg Hx      I have reviewed and agree with the history as documented      E-Cigarette/Vaping    E-Cigarette Use Never User E-Cigarette/Vaping Substances     Social History     Tobacco Use    Smoking status: Never Smoker    Smokeless tobacco: Never Used   Substance Use Topics    Alcohol use: Not Currently     Drinks per session: 1 or 2     Comment: occosional - every 3 months    Drug use: Never       Review of Systems   Constitutional: Negative for chills, diaphoresis and fever  HENT: Negative for nosebleeds, sore throat and trouble swallowing  Eyes: Negative for photophobia, pain and visual disturbance  Respiratory: Positive for cough and shortness of breath (walking up stairs)  Negative for chest tightness  Cardiovascular: Negative for chest pain, palpitations and leg swelling  Gastrointestinal: Negative for abdominal pain, constipation, diarrhea, nausea and vomiting  Endocrine: Negative for polydipsia and polyuria  Genitourinary: Negative for difficulty urinating, dysuria, hematuria, pelvic pain, vaginal bleeding and vaginal discharge  Musculoskeletal: Negative for back pain, neck pain and neck stiffness  Skin: Negative for pallor and rash  Neurological: Negative for dizziness, seizures, light-headedness and headaches  All other systems reviewed and are negative  Physical Exam  Physical Exam  Vitals signs and nursing note reviewed  Constitutional:       General: She is not in acute distress  Appearance: She is well-developed  HENT:      Head: Normocephalic and atraumatic  Eyes:      Pupils: Pupils are equal, round, and reactive to light  Neck:      Musculoskeletal: Normal range of motion and neck supple  Cardiovascular:      Rate and Rhythm: Tachycardia present  Rhythm irregular  Pulses: Normal pulses  Heart sounds: Normal heart sounds  Pulmonary:      Effort: Pulmonary effort is normal  No respiratory distress  Breath sounds: Normal breath sounds  Abdominal:      General: There is no distension  Palpations: Abdomen is soft  Abdomen is not rigid  Tenderness: There is no abdominal tenderness  There is no guarding or rebound  Musculoskeletal: Normal range of motion  General: No tenderness  Lymphadenopathy:      Cervical: No cervical adenopathy  Skin:     General: Skin is warm and dry  Capillary Refill: Capillary refill takes less than 2 seconds  Neurological:      Mental Status: She is alert and oriented to person, place, and time  Cranial Nerves: No cranial nerve deficit  Sensory: No sensory deficit           Vital Signs  ED Triage Vitals [01/03/21 1013]   Temperature Pulse Respirations Blood Pressure SpO2   98 1 °F (36 7 °C) (!) 109 18 141/84 99 %      Temp Source Heart Rate Source Patient Position - Orthostatic VS BP Location FiO2 (%)   Oral Monitor -- -- --      Pain Score       --           Vitals:    01/03/21 1013   BP: 141/84   Pulse: (!) 109         Visual Acuity      ED Medications  Medications   metoprolol (LOPRESSOR) injection 5 mg (5 mg Intravenous Given 1/3/21 1043)       Diagnostic Studies  Results Reviewed     Procedure Component Value Units Date/Time    Comprehensive metabolic panel [[id number]]  (Abnormal) Collected: 01/03/21 1037    Lab Status: Final result Specimen: Blood from Arm, Right Updated: 01/03/21 1114     Sodium 137 mmol/L      Potassium 4 4 mmol/L      Chloride 104 mmol/L      CO2 28 mmol/L      ANION GAP 5 mmol/L      BUN 29 mg/dL      Creatinine 1 02 mg/dL      Glucose 141 mg/dL      Calcium 9 9 mg/dL      AST 20 U/L      ALT 30 U/L      Alkaline Phosphatase 65 U/L      Total Protein 7 1 g/dL      Albumin 3 6 g/dL      Total Bilirubin 1 04 mg/dL      eGFR 54 ml/min/1 73sq m     Narrative:      [place] guidelines for Chronic Kidney Disease (CKD):     Stage 1 with normal or high GFR (GFR > 90 mL/min/1 73 square meters)    Stage 2 Mild CKD (GFR = 60-89 mL/min/1 73 square meters)    Stage 3A Moderate CKD (GFR = 45-59 mL/min/1 73 square meters)    Stage 3B Moderate CKD (GFR = 30-44 mL/min/1 73 square meters)    Stage 4 Severe CKD (GFR = 15-29 mL/min/1 73 square meters)    Stage 5 End Stage CKD (GFR <15 mL/min/1 73 square meters)  Note: GFR calculation is accurate only with a steady state creatinine    NT-BNP PRO [[id number]]  (Abnormal) Collected: 01/03/21 1037    Lab Status: Final result Specimen: Blood from Arm, Right Updated: 01/03/21 1114     NT-proBNP 2,773 pg/mL     Troponin I [[id number]]  (Normal) Collected: 01/03/21 1037    Lab Status: Final result Specimen: Blood from Arm, Right Updated: 01/03/21 1109     Troponin I <0 02 ng/mL     CBC and differential [[id number]] Collected: 01/03/21 1037    Lab Status: Final result Specimen: Blood from Arm, Right Updated: 01/03/21 1051     WBC 6 05 Thousand/uL      RBC 4 28 Million/uL      Hemoglobin 13 7 g/dL      Hematocrit 41 8 %      MCV 98 fL      MCH 32 0 pg      MCHC 32 8 g/dL      RDW 13 3 %      MPV 10 2 fL      Platelets 468 Thousands/uL      nRBC 0 /100 WBCs      Neutrophils Relative 53 %      Immat GRANS % 0 %      Lymphocytes Relative 32 %      Monocytes Relative 11 %      Eosinophils Relative 3 %      Basophils Relative 1 %      Neutrophils Absolute 3 27 Thousands/µL      Immature Grans Absolute 0 02 Thousand/uL      Lymphocytes Absolute 1 92 Thousands/µL      Monocytes Absolute 0 64 Thousand/µL      Eosinophils Absolute 0 15 Thousand/µL      Basophils Absolute 0 05 Thousands/µL                  XR chest 1 view portable   ED Interpretation by Mickie Mcgee DO (01/03 1051)   No cardiomegaly  No infiltrates                   Procedures  ECG 12 Lead Documentation Only    Date/Time: 1/3/2021 10:35 AM  Performed by: Mickie Mcgee DO  Authorized by: Mickie Mcgee DO     ECG reviewed by me, the ED Provider: yes    Patient location:  ED  Previous ECG:     Previous ECG:  Compared to current    Similarity:  Changes noted    Comparison to cardiac monitor: Yes    Comments:      Atrial fibrillation at a rate of 106 beats per minute  Rightward axis  Occasional PVCs  Normal QRS  Diffuse T-wave flattening and T-wave inversions  Rate and rhythm change from previous EKG from 12/28/2020  ED Course                             SBIRT 20yo+      Most Recent Value   SBIRT (24 yo +)   In order to provide better care to our patients, we are screening all of our patients for alcohol and drug use  Would it be okay to ask you these screening questions? No Filed at: 01/03/2021 1120                    MDM  Number of Diagnoses or Management Options  Atrial fibrillation with RVR [facility]): new and requires workup  Diastolic CHF [facility]): new and requires workup  Diagnosis management comments: Patient with a history of paroxysmal atrial fibrillation and diastolic dysfunction presents with palpitations and exertional dyspnea  Patient is in atrial fibrillation with heart rate up to 120 beats per minute  Patient is on beta-blockers at home and was given IV metoprolol in the emergency department  Heart rate anywhere from 90-110s  BNP significantly elevated  Evidence of acute on chronic CHF  Will hospitalize for further workup and treatment         Amount and/or Complexity of Data Reviewed  Clinical lab tests: ordered and reviewed  Tests in the radiology section of CPT®: ordered and reviewed  Tests in the medicine section of CPT®: ordered and reviewed  Review and summarize past medical records: yes  Discuss the patient with other providers: yes  Independent visualization of images, tracings, or specimens: yes    Risk of Complications, Morbidity, and/or Mortality  Presenting problems: high  Diagnostic procedures: moderate  Management options: high    Patient Progress  Patient progress: stable      Disposition  Final diagnoses:   Atrial fibrillation with RVR ([facility] [address] )   Diastolic CHF ([facility] [address] )     Time reflects when diagnosis was documented in both MDM as applicable and the Disposition within this note     Time User Action Codes Description Comment 1/3/2021 11:37 AM Cait Mas L Add [I48 [id number]] Atrial fibrillation with RVR ([address] )     1/3/2021 11:37 AM Cait Mas L Add [K56 [id number]] Diastolic CHF [facility])       ED Disposition     ED Disposition Condition Date/Time Comment    Admit Stable Sun Dinh 3, 2021 11:37 AM Case was discussed with FLAKITA and the patient's admission status was agreed to be Admission Status: inpatient status to the service of Dr Lefty Machuca   Follow-up Information    None         Patient's Medications   Discharge Prescriptions    No medications on file     No discharge procedures on file      PDMP Review       Value Time User    PDMP Reviewed  Yes 8/18/2020  4:58 PM Joe Sahni MD          ED Provider  Electronically Signed by           Cisco Garrison DO  01/03/21 9249

## 2021-01-03 NOTE — H&P
H&P- James James 1945, 67 y o  female MRN: [id number]    Unit/Bed#: FT 01 Encounter: [id number]    Primary Care Provider: Alessia Nino MD   Date and time admitted to hospital: 1/3/2021 10:14 AM        * Atrial fibrillation with rapid ventricular response (HCC)  Assessment & Plan  POA, palpitation and exertional SOB started 3 days prior to admission  ECG on admission showing a fib with RVR responding to 1 dose of IV lopressor 5 mg  HR rate is getting uncontrolled with minimal exertion  Plan:  · Increase metoprolol to 50 mg bid  · Checking TSH  · Checking Echo  · Consult cardiology as there was a plan of possible SHANNAN with cardioversion in clinic, appreciate recommendations  Will place the patient NPO at midnight  · Continue sotalol home dose and eliquis   · Initial troponin negative, continue to trend peak  · Monitor on telemetry    Chronic diastolic CHF (congestive heart failure) (HCC)  Assessment & Plan  Wt Readings from Last 3 Encounters:   12/21/20 68 9 kg (152 lb)   12/10/20 68 9 kg (152 lb)   12/09/20 68 9 kg (152 lb)       Appears compensated at this time  Elevated proBNP likely secondary to AFib RVR  Chest x-ray unremarkable  Last echo in 2018 with preserved EF and grade 2 diastolic dysfunction  Patient is not any diuretics at home    Plan:  · Will check echo  · Monitor daily weights and I&Os      Hyperlipidemia  Assessment & Plan  Continue Zetia    Essential hypertension  Assessment & Plan  Blood pressure is acceptable  · Continue sotalol, losartan and metoprolol (increased to 50 mg b i d  on admission)  · Monitor blood pressure      VTE Prophylaxis: Apixaban (Eliquis)  / sequential compression device   Code Status:  Level 1, full code  POLST: POLST form is not discussed and not completed at this time      Anticipated Length of Stay:  Patient will be admitted on an Inpatient basis with an anticipated length of stay of  greater than 2 midnights     Justification for Hospital Stay:  AFib with requiring further management and workup     Chief Complaint:   Palpitation and exertional shortness of breath     History of Present Illness:     Adolph Bojorquez is a 67 y o  female with past medical history significant for paroxysmal atrial fibrillation, chronic diastolic heart failure, hypertension and hyperlipidemia who presents with palpitation exertion shortness of breath since Thursday  Patient was recently seen by Cardiology in clinic and was started on metoprolol 25 mg b i d  With improvement of her symptoms however on Thursday she started to feel palpitation exertional dyspnea again  She denied any chest pain, fever, chills, cough, dizziness  She denies orthopnea or nocturnal paroxysmal dyspnea or weight gain or change in her diet  Denies any caffeine intake, denies alcohol and denies any symptoms of AKIN other than occasional snoring  There was a discussion more when patient was seen by Cardiology in clinic about cardioversion F heart rate continued to be uncontrolled which was also plan to do a SHANNAN prior to it as patient has been off of Eliquis due to a procedure and was resumed on 12/16/2020      She was found to have at AFib with RVR on EKG on admission the responded to 1 dose of Lopressor IV 5 mg p o  At when she was seen her heart rate was ranging in the 120s with minimal exertion  Patient will be [facility] to [facility] for rate control and possible HSANNAN with cardioversion with cardiology assistance      Review of Systems:     Review of Systems   Constitutional: Negative for activity change, appetite change, chills, diaphoresis, fever and unexpected weight change  HENT: Negative for congestion and sore throat  Respiratory: Positive for shortness of breath  Negative for cough and wheezing  Cardiovascular: Positive for palpitations  Negative for chest pain and leg swelling  Gastrointestinal: Negative for abdominal pain, diarrhea, nausea and vomiting     Genitourinary: Negative for difficulty urinating and dysuria  Musculoskeletal: Negative for arthralgias and back pain  Skin: Negative for rash  Neurological: Negative for dizziness, light-headedness and headaches  Psychiatric/Behavioral: Negative for confusion  The patient is not nervous/anxious      All other systems reviewed and are negative         Past Medical and Surgical History:      Medical History        Past Medical History:   Diagnosis Date    Actinic keratosis       last assessed - 06Jun2014    Arthritis      Atrial fibrillation with rapid ventricular response (HCC)       last assessed - 26Apr2016    Basal cell carcinoma      Benign colon polyp       last assessed - 27Apr2015    Disease of thyroid gland      Effusion of knee joint right       Resolved - 19Apr2016    Esophageal reflux      Fibromyalgia       last assessed - [id number]    Fibromyalgia, primary      GERD (gastroesophageal reflux disease)      Hypertension      Palpitations       last assessed - 30Apr2013    Peroneal tendonitis, right       last assessed - 02Oct2013    Right lumbar radiculopathy 3/17/2016    Thyroid nodule      Trochanteric bursitis of left hip 3/9/2018           Surgical History         Past Surgical History:   Procedure Laterality Date    CATARACT EXTRACTION Bilateral      COLONOSCOPY   03/2018    EYE SURGERY        HYSTERECTOMY        JOINT REPLACEMENT Left       knee    TX REVISE MEDIAN N/CARPAL TUNNEL SURG Right 11/14/2019     Procedure: RELEASE CARPAL TUNNEL;  Surgeon: Andrés Sommer MD;  [facility];  Service: Orthopedics    TX THYROID LOBECTOMY,UNILAT Left 12/16/2020     Procedure: Left THYROID LOBECTOMY;  Surgeon: Yodit Rich MD;  [facility];  Service: ENT    RECTAL POLYPECTOMY        REPLACEMENT TOTAL KNEE Left       last assessed - 27Apr2015    TONSILLECTOMY        TOTAL ABDOMINAL HYSTERECTOMY        TUBAL LIGATION        US GUIDED THYROID BIOPSY   10/14/2020    Meds/Allergies:             Prior to Admission medications    Medication Sig Start Date End Date Taking?  Authorizing Provider   acetaminophen (TYLENOL) 650 mg CR tablet Take 1 tablet (650 mg total) by mouth every 6 (six) hours as needed for mild pain or moderate pain 12/16/20     Chantelle Hughes MD   apixaban (ELIQUIS) 5 mg Take 1 tablet (5 mg total) by mouth 2 (two) times a day 12/17/20     Chantelle Hughes MD   ascorbic acid (VITAMIN C) 500 mg tablet Take 500 mg by mouth daily        Historical Provider, MD   Cholecalciferol ([employer] VITAMIN D) 2000 UNITS CAPS Take 2,000 Units by mouth 2 (two) times a day         Historical Provider, MD   Chromium Picolinate (CHROMIUM PICOLATE PO) Take 1 tablet by mouth daily       Historical Provider, MD   ezetimibe (ZETIA) 10 mg tablet TAKE 1 TABLET BY MOUTH  DAILY 9/4/20     Kay Freire MD   famotidine (PEPCID) 20 mg tablet Take 20 mg by mouth daily         Historical Provider, MD   gabapentin (NEURONTIN) 300 mg capsule TAKE 2 CAPSULES BY MOUTH AT BEDTIME 6/26/20     Nanci Collins MD   losartan (COZAAR) 100 MG tablet TAKE 1 TABLET BY MOUTH  DAILY 3/9/20     Kay Freire MD   metoprolol succinate (TOPROL-XL) 25 mg 24 hr tablet Take 1 tablet (25 mg total) by mouth every 12 (twelve) hours 12/21/20     JENNY Forrest   oxyCODONE (ROXICODONE) 5 mg immediate release tablet Take 1 tablet (5 mg total) by mouth every 6 (six) hours as needed for severe painMax Daily Amount: 20 mg 12/16/20     Chantelle Hughes MD   rOPINIRole (REQUIP) 0 25 mg tablet TAKE 1 TABLET BY MOUTH  DAILY AT BEDTIME 10/2/20     Kay Freire MD   sotalol (BETAPACE) 120 mg tablet Take 1 tablet (120 mg total) by mouth every 12 (twelve) hours 12/21/20     JENNY Forrest   traMADol (ULTRAM) 50 mg tablet TAKE 1 TABLET BY MOUTH  TWICE DAILY AS NEEDED FOR  MODERATE PAIN 8/18/20     Kay Freire MD   TURMERIC PO Take 1 capsule by mouth 2 (two) times a day       Historical Provider, MD   zolpidem (AMBIEN) 10 mg tablet Take 1 tablet (10 mg total) by mouth daily at bedtime as needed for sleep 10/9/20     Aileen Dowd MD      I have reviewed home medications with patient personally      Allergies:          Allergies   Allergen Reactions    Penicillins Other (See Comments)       As a child calcium deposit in the arm     Ace Inhibitors GI Intolerance       Did feel well on it         Social History:     Marital Status: /Civil Union   Occupation:   Patient Pre-hospital Living Situation:  Home with family  Patient Pre-hospital Level of Mobility:  Independent without any assistance  Patient Pre-hospital Diet Restrictions:  None  Substance Use History:   Social History           Substance and Sexual Activity   Alcohol Use Not Currently    Drinks per session: 1 or 2     Comment: occosional - every 3 months      Social History          Tobacco Use   Smoking Status Never Smoker   Smokeless Tobacco Never Used      Social History          Substance and Sexual Activity   Drug Use Never         Family History:           Family History   Problem Relation Age of Onset    Heart disease Mother      Diabetes Mother      Heart disease Father      Coronary artery disease Father      Stroke Father           cerebrovascular accident    Heart attack Father           myocardial infarction    Sudden death Father           scd    Other Family           Back disorder    Coronary artery disease Family      Neuropathy Family      Osteoporosis Family      No Known Problems Daughter      No Known Problems Maternal Grandmother      No Known Problems Maternal Grandfather      No Known Problems Paternal Grandmother      No Known Problems Paternal Grandfather      Cancer Maternal Uncle      Breast cancer Maternal Aunt 72    No Known Problems Son      No Known Problems Maternal Aunt      No Known Problems Maternal Aunt      No Known Problems Maternal Aunt      No Known Problems Paternal Aunt      No Known Problems Paternal Aunt      Anuerysm Neg Hx      Clotting disorder Neg Hx      Arrhythmia Neg Hx      Heart failure Neg Hx           Physical Exam:      Vitals:   Blood Pressure: 126/94 (01/03/21 1259)  Pulse: (!) 111 (01/03/21 1259)  Temperature: 98 1 °F (36 7 °C) (01/03/21 1013)  Temp Source: Oral (01/03/21 1013)  Respirations: 16 (01/03/21 1259)  SpO2: 97 % (01/03/21 1259)     Physical Exam  Vitals signs and nursing note reviewed  Constitutional:       General: She is not in acute distress  Appearance: She is not ill-appearing, toxic-appearing or diaphoretic  HENT:      Head: Normocephalic and atraumatic  Mouth/Throat:      Mouth: Mucous membranes are moist       Pharynx: Oropharynx is clear  Eyes:      General: No scleral icterus  Extraocular Movements: Extraocular movements intact  Conjunctiva/sclera: Conjunctivae normal    Neck:      Musculoskeletal: Neck supple  Cardiovascular:      Rate and Rhythm: Tachycardia present  Rhythm irregular  Heart sounds: Normal heart sounds  No murmur  Pulmonary:      Effort: Pulmonary effort is normal  No respiratory distress  Breath sounds: Normal breath sounds  No wheezing, rhonchi or rales  Abdominal:      General: Bowel sounds are normal       Palpations: Abdomen is soft  Tenderness: There is no abdominal tenderness  Musculoskeletal: Normal range of motion  Right lower leg: No edema  Left lower leg: No edema  Lymphadenopathy:      Cervical: No cervical adenopathy  Skin:     General: Skin is warm and dry  Capillary Refill: Capillary refill takes less than 2 seconds  Neurological:      General: No focal deficit present  Mental Status: She is alert and oriented to person, place, and time  Psychiatric:         Mood and Affect: Mood normal          Behavior: Behavior normal          Thought Content:  Thought content normal          Judgment: Judgment normal             Additional Data:      Lab Results: I have personally reviewed pertinent reports             Results from last 7 days   Lab Units 01/03/21  1037   WBC Thousand/uL 6 05   HEMOGLOBIN g/dL 13 7   HEMATOCRIT % 41 8   PLATELETS Thousands/uL 264   NEUTROS PCT % 53   LYMPHS PCT % 32   MONOS PCT % 11   EOS PCT % 3           Results from last 7 days   Lab Units 01/03/21  1037   POTASSIUM mmol/L 4 4   CHLORIDE mmol/L 104   CO2 mmol/L 28   BUN mg/dL 29*   CREATININE mg/dL 1 02   CALCIUM mg/dL 9 9   ALK PHOS U/L 65   ALT U/L 30   AST U/L 20             Imaging: I have personally reviewed pertinent reports        Xr Chest 1 View Portable     Result Date: 1/3/2021  Narrative: CHEST INDICATION:   Cough  COMPARISON:  Chest radiograph from 9/15/2018  EXAM PERFORMED/VIEWS:  XR CHEST PORTABLE FINDINGS: Cardiomediastinal silhouette appears unremarkable  The lungs are clear  No pneumothorax or pleural effusion  Osseous structures appear within normal limits for patient age       Impression: No acute cardiopulmonary disease  Workstation performed: [id number]         EKG, Pathology, and Other Studies Reviewed on Admission:   · EKG:  AFib with RVR     Epic / Care Everywhere Records Reviewed: Yes      ** Please Note: This note has been constructed using a voice recognition system   **

## 2021-01-03 NOTE — ASSESSMENT & PLAN NOTE
POA, palpitation and exertional SOB started 3 days prior to admission  ECG on admission showing a fib with RVR responding to 1 dose of IV lopressor 5 mg  HR rate is getting uncontrolled with minimal exertion  Plan:  · Increase metoprolol to 50 mg bid  · Checking TSH  · Checking Echo  · Consult cardiology as there was a plan of possible SHANNAN with cardioversion in clinic, appreciate recommendations   Will place the patient NPO at midnight  · Continue sotalol home dose and eliquis   · Initial troponin negative, continue to trend peak

## 2021-01-03 NOTE — PROGRESS NOTES
Progress Note - Garry Wiseman 1945, 67 y o  female MRN: [id number]    Unit/Bed#: FT 01 Encounter: [id number]    Primary Care Provider: Dax Beatty MD   Date and time admitted to hospital: 1/3/2021 10:14 AM        * Atrial fibrillation with rapid ventricular response (HCC)  Assessment & Plan  POA, palpitation and exertional SOB started 3 days prior to admission  ECG on admission showing a fib with RVR responding to 1 dose of IV lopressor 5 mg  HR rate is getting uncontrolled with minimal exertion  Plan:  · Increase metoprolol to 50 mg bid  · Checking TSH  · Checking Echo  · Consult cardiology as there was a plan of possible SHANNAN with cardioversion in clinic, appreciate recommendations  Will place the patient NPO at midnight  · Continue sotalol home dose and eliquis     Chronic diastolic CHF (congestive heart failure) (HCC)  Assessment & Plan  Wt Readings from Last 3 Encounters:   12/21/20 68 9 kg (152 lb)   12/10/20 68 9 kg (152 lb)   12/09/20 68 9 kg (152 lb)       Appears compensated at this time  Elevated proBNP likely secondary to AFib RVR  Chest x-ray unremarkable  Last echo in 2018 with preserved EF and grade 2 diastolic dysfunction  Patient is not any diuretics at home    Plan:  · Will check echo  · Monitor daily weights and I&Os      Hyperlipidemia  Assessment & Plan  Continue Zetia    Essential hypertension  Assessment & Plan  Blood pressure is acceptable  · Continue sotalol, losartan and metoprolol (increased to 50 mg b i d  on admission)  · Monitor blood pressure        VTE Prophylaxis: Apixaban (Eliquis)  / sequential compression device   Code Status:  Level 1, full code  POLST: POLST form is not discussed and not completed at this time  Anticipated Length of Stay:  Patient will be admitted on an Inpatient basis with an anticipated length of stay of  greater than 2 midnights     Justification for Hospital Stay:  AFib with requiring further management and workup    Chief Complaint: Palpitation and exertional shortness of breath    History of Present Illness:    Chong Valdez is a 67 y o  female with past medical history significant for paroxysmal atrial fibrillation, chronic diastolic heart failure, hypertension and hyperlipidemia who presents with palpitation exertion shortness of breath since Thursday  Patient was recently seen by Cardiology in clinic and was started on metoprolol 25 mg b i d  With improvement of her symptoms however on Thursday she started to feel palpitation exertional dyspnea again  She denied any chest pain, fever, chills, cough, dizziness  She denies orthopnea or nocturnal paroxysmal dyspnea or weight gain or change in her diet  Denies any caffeine intake, denies alcohol and denies any symptoms of AKIN other than occasional snoring  There was a discussion more when patient was seen by Cardiology in clinic about cardioversion F heart rate continued to be uncontrolled which was also plan to do a SHANNAN prior to it as patient has been off of Eliquis due to a procedure and was resumed on 12/16/2020  She was found to have at AFib with RVR on EKG on admission the responded to 1 dose of Lopressor IV 5 mg p o  At when she was seen her heart rate was ranging in the 120s with minimal exertion  Patient will be admitted to [facility] for rate control and possible SHANNAN with cardioversion with cardiology assistance  Review of Systems:    Review of Systems   Constitutional: Negative for activity change, appetite change, chills, diaphoresis, fever and unexpected weight change  HENT: Negative for congestion and sore throat  Respiratory: Positive for shortness of breath  Negative for cough and wheezing  Cardiovascular: Positive for palpitations  Negative for chest pain and leg swelling  Gastrointestinal: Negative for abdominal pain, diarrhea, nausea and vomiting  Genitourinary: Negative for difficulty urinating and dysuria     Musculoskeletal: Negative for arthralgias and back pain  Skin: Negative for rash  Neurological: Negative for dizziness, light-headedness and headaches  Psychiatric/Behavioral: Negative for confusion  The patient is not nervous/anxious  All other systems reviewed and are negative  Past Medical and Surgical History:     Past Medical History:   Diagnosis Date    Actinic keratosis     last assessed - [id number]    Arthritis     Atrial fibrillation with rapid ventricular response ([address] )     last assessed - [id number]    Basal cell carcinoma     Benign colon polyp     last assessed - [id number]    Disease of thyroid gland     Effusion of knee joint right     Resolved - [id number]    Esophageal reflux     Fibromyalgia     last assessed - [id number]    Fibromyalgia, primary     GERD (gastroesophageal reflux disease)     Hypertension     Palpitations     last assessed - [id number]    Peroneal tendonitis, right     last assessed - [id number]    Right lumbar radiculopathy 3/17/2016    Thyroid nodule     Trochanteric bursitis of left hip 3/9/2018       Past Surgical History:   Procedure Laterality Date    CATARACT EXTRACTION Bilateral     COLONOSCOPY  03/2018    EYE SURGERY      HYSTERECTOMY      JOINT REPLACEMENT Left     knee    LA REVISE MEDIAN N/CARPAL TUNNEL SURG Right 11/14/2019    Procedure: RELEASE CARPAL TUNNEL;  Surgeon: Nora Kaiser MD;  [facility];  Service: Orthopedics    LA THYROID LOBECTOMY,UNILAT Left 12/16/2020    Procedure: Left THYROID LOBECTOMY;  Surgeon: Hosea Childs MD;  [facility];  Service: ENT    RECTAL POLYPECTOMY      REPLACEMENT TOTAL KNEE Left     last assessed - [id number]    TONSILLECTOMY      TOTAL ABDOMINAL HYSTERECTOMY      TUBAL LIGATION      US GUIDED THYROID BIOPSY  10/14/2020       Meds/Allergies:    Prior to Admission medications    Medication Sig Start Date End Date Taking?  Authorizing Provider   acetaminophen (TYLENOL) 650 mg CR tablet Take 1 tablet (650 mg total) by mouth every 6 (six) hours as needed for mild pain or moderate pain 12/16/20   Leroy Kessler MD   apixaban Adithya Antes) 5 mg Take 1 tablet (5 mg total) by mouth 2 (two) times a day 12/17/20   Leroy Kessler MD   ascorbic acid (VITAMIN C) 500 mg tablet Take 500 mg by mouth daily     Historical Provider, MD   Cholecalciferol ([employer] VITAMIN D) 2000 UNITS CAPS Take 2,000 Units by mouth 2 (two) times a day      Historical Provider, MD   Chromium Picolinate (CHROMIUM PICOLATE PO) Take 1 tablet by mouth daily    Historical Provider, MD   ezetimibe (ZETIA) 10 mg tablet TAKE 1 TABLET BY MOUTH  DAILY 9/4/20   Dinora Friend MD   famotidine (PEPCID) 20 mg tablet Take 20 mg by mouth daily      Historical Provider, MD   gabapentin (NEURONTIN) 300 mg capsule TAKE 2 CAPSULES BY MOUTH AT BEDTIME 6/26/20   Tuan Brothers MD   losartan (COZAAR) 100 MG tablet TAKE 1 TABLET BY MOUTH  DAILY 3/9/20   Dinora Friend MD   metoprolol succinate (TOPROL-XL) 25 mg 24 hr tablet Take 1 tablet (25 mg total) by mouth every 12 (twelve) hours 12/21/20   JENNY Ochoa   oxyCODONE (ROXICODONE) 5 mg immediate release tablet Take 1 tablet (5 mg total) by mouth every 6 (six) hours as needed for severe painMax Daily Amount: 20 mg 12/16/20   Leroy Kessler MD   rOPINIRole (REQUIP) 0 25 mg tablet TAKE 1 TABLET BY MOUTH  DAILY AT BEDTIME 10/2/20   Dinora Friend MD   sotalol (BETAPACE) 120 mg tablet Take 1 tablet (120 mg total) by mouth every 12 (twelve) hours 12/21/20   JENNY Ochoa   traMADol (ULTRAM) 50 mg tablet TAKE 1 TABLET BY MOUTH  TWICE DAILY AS NEEDED FOR  MODERATE PAIN 8/18/20   Dinora Friend MD   TURMERIC PO Take 1 capsule by mouth 2 (two) times a day    Historical Provider, MD   zolpidem (AMBIEN) 10 mg tablet Take 1 tablet (10 mg total) by mouth daily at bedtime as needed for sleep 10/9/20   Dinora Friend MD     I have reviewed home medications with patient personally  Allergies:    Allergies   Allergen Reactions    Penicillins Other (See Comments)     As a child calcium deposit in the arm     Ace Inhibitors GI Intolerance     Did feel well on it       Social History:     Marital Status: /Civil Union   Occupation:   Patient Pre-hospital Living Situation:  Home with family  Patient Pre-hospital Level of Mobility:  Independent without any assistance  Patient Pre-hospital Diet Restrictions:  None  Substance Use History:   Social History     Substance and Sexual Activity   Alcohol Use Not Currently    Drinks per session: 1 or 2    Comment: occosional - every 3 months     Social History     Tobacco Use   Smoking Status Never Smoker   Smokeless Tobacco Never Used     Social History     Substance and Sexual Activity   Drug Use Never       Family History:    Family History   Problem Relation Age of Onset    Heart disease Mother     Diabetes Mother     Heart disease Father     Coronary artery disease Father     Stroke Father         cerebrovascular accident    Heart attack Father         myocardial infarction    Sudden death Father         scd    Other Family         Back disorder    Coronary artery disease Family     Neuropathy Family     Osteoporosis Family     No Known Problems Daughter     No Known Problems Maternal Grandmother     No Known Problems Maternal Grandfather     No Known Problems Paternal Grandmother     No Known Problems Paternal Grandfather     Cancer Maternal Uncle     Breast cancer Maternal Aunt 72    No Known Problems Son     No Known Problems Maternal Aunt     No Known Problems Maternal Aunt     No Known Problems Maternal Aunt     No Known Problems Paternal Aunt     No Known Problems Paternal Aunt     Anuerysm Neg Hx     Clotting disorder Neg Hx     Arrhythmia Neg Hx     Heart failure Neg Hx        Physical Exam:     Vitals:   Blood Pressure: 126/94 (01/03/21 1259)  Pulse: (!) 111 (01/03/21 1259)  Temperature: 98 1 °F (36 7 °C) (01/03/21 1013)  Temp Source: Oral (01/03/21 1013)  Respirations: 16 (01/03/21 1259)  SpO2: 97 % (01/03/21 1259)    Physical Exam  Vitals signs and nursing note reviewed  Constitutional:       General: She is not in acute distress  Appearance: She is not ill-appearing, toxic-appearing or diaphoretic  HENT:      Head: Normocephalic and atraumatic  Mouth/Throat:      Mouth: Mucous membranes are moist       Pharynx: Oropharynx is clear  Eyes:      General: No scleral icterus  Extraocular Movements: Extraocular movements intact  Conjunctiva/sclera: Conjunctivae normal    Neck:      Musculoskeletal: Neck supple  Cardiovascular:      Rate and Rhythm: Tachycardia present  Rhythm irregular  Heart sounds: Normal heart sounds  No murmur  Pulmonary:      Effort: Pulmonary effort is normal  No respiratory distress  Breath sounds: Normal breath sounds  No wheezing, rhonchi or rales  Abdominal:      General: Bowel sounds are normal       Palpations: Abdomen is soft  Tenderness: There is no abdominal tenderness  Musculoskeletal: Normal range of motion  Right lower leg: No edema  Left lower leg: No edema  Lymphadenopathy:      Cervical: No cervical adenopathy  Skin:     General: Skin is warm and dry  Capillary Refill: Capillary refill takes less than 2 seconds  Neurological:      General: No focal deficit present  Mental Status: She is alert and oriented to person, place, and time  Psychiatric:         Mood and Affect: Mood normal          Behavior: Behavior normal          Thought Content: Thought content normal          Judgment: Judgment normal          Additional Data:     Lab Results: I have personally reviewed pertinent reports        Results from last 7 days   Lab Units 01/03/21  1037   WBC Thousand/uL 6 05   HEMOGLOBIN g/dL 13 7   HEMATOCRIT % 41 8   PLATELETS Thousands/uL 264   NEUTROS PCT % 53   LYMPHS PCT % 32   MONOS PCT % 11   EOS PCT % 3     Results from last 7 days   Lab Units 01/03/21  1037   POTASSIUM mmol/L 4 4   CHLORIDE mmol/L 104   CO2 mmol/L 28   BUN mg/dL 29*   CREATININE mg/dL 1 02   CALCIUM mg/dL 9 9   ALK PHOS U/L 65   ALT U/L 30   AST U/L 20           Imaging: I have personally reviewed pertinent reports  Xr Chest 1 View Portable    Result Date: 1/3/2021  Narrative: CHEST INDICATION:   Cough  COMPARISON:  Chest radiograph from 9/15/2018  EXAM PERFORMED/VIEWS:  XR CHEST PORTABLE FINDINGS: Cardiomediastinal silhouette appears unremarkable  The lungs are clear  No pneumothorax or pleural effusion  Osseous structures appear within normal limits for patient age  Impression: No acute cardiopulmonary disease  Workstation performed: [id number]       EKG, Pathology, and Other Studies Reviewed on Admission:   · EKG:  AFib with RVR    Epic / Care Everywhere Records Reviewed: Yes     ** Please Note: This note has been constructed using a voice recognition system   **

## 2021-01-03 NOTE — ED NOTES
Pt expressed major stress at home w/ being sole care giver for  w/ parkinson's disease  Pt has anxiety over this fact, and actively crying  Daughter was at bedside telling pt not worry about it and she would take care of it       Kris Narayanan RN  01/03/21 1542

## 2021-01-04 ENCOUNTER — APPOINTMENT (INPATIENT)
Dept: NON INVASIVE DIAGNOSTICS | Facility: HOSPITAL | Age: 76
DRG: 309 | End: 2021-01-04
Payer: MEDICARE

## 2021-01-04 LAB
ANION GAP SERPL CALCULATED.3IONS-SCNC: 7 MMOL/L (ref 4–13)
ATRIAL RATE: 60 BPM
ATRIAL RATE: 61 BPM
BUN SERPL-MCNC: 29 MG/DL (ref 5–25)
CALCIUM SERPL-MCNC: 9.4 MG/DL (ref 8.3–10.1)
CHLORIDE SERPL-SCNC: 104 MMOL/L (ref 100–108)
CO2 SERPL-SCNC: 29 MMOL/L (ref 21–32)
CREAT SERPL-MCNC: 0.97 MG/DL (ref 0.6–1.3)
ERYTHROCYTE [DISTWIDTH] IN BLOOD BY AUTOMATED COUNT: 13.2 % (ref 11.6–15.1)
GFR SERPL CREATININE-BSD FRML MDRD: 57 ML/MIN/1.73SQ M
GLUCOSE SERPL-MCNC: 107 MG/DL (ref 65–140)
HCT VFR BLD AUTO: 42.6 % (ref 34.8–46.1)
HGB BLD-MCNC: 13.9 G/DL (ref 11.5–15.4)
MAGNESIUM SERPL-MCNC: 2.1 MG/DL (ref 1.6–2.6)
MCH RBC QN AUTO: 31.9 PG (ref 26.8–34.3)
MCHC RBC AUTO-ENTMCNC: 32.6 G/DL (ref 31.4–37.4)
MCV RBC AUTO: 98 FL (ref 82–98)
P AXIS: 23 DEGREES
P AXIS: 9 DEGREES
PLATELET # BLD AUTO: 263 THOUSANDS/UL (ref 149–390)
PMV BLD AUTO: 10.2 FL (ref 8.9–12.7)
POTASSIUM SERPL-SCNC: 4.2 MMOL/L (ref 3.5–5.3)
PR INTERVAL: 168 MS
PR INTERVAL: 170 MS
QRS AXIS: 63 DEGREES
QRS AXIS: 63 DEGREES
QRSD INTERVAL: 90 MS
QRSD INTERVAL: 92 MS
QT INTERVAL: 496 MS
QT INTERVAL: 502 MS
QTC INTERVAL: 499 MS
QTC INTERVAL: 502 MS
RBC # BLD AUTO: 4.36 MILLION/UL (ref 3.81–5.12)
SODIUM SERPL-SCNC: 140 MMOL/L (ref 136–145)
T WAVE AXIS: 5 DEGREES
T WAVE AXIS: 6 DEGREES
T4 FREE SERPL-MCNC: 0.93 NG/DL (ref 0.76–1.46)
VENTRICULAR RATE: 60 BPM
VENTRICULAR RATE: 61 BPM
WBC # BLD AUTO: 5.81 THOUSAND/UL (ref 4.31–10.16)

## 2021-01-04 PROCEDURE — 93306 TTE W/DOPPLER COMPLETE: CPT

## 2021-01-04 PROCEDURE — 99223 1ST HOSP IP/OBS HIGH 75: CPT | Performed by: INTERNAL MEDICINE

## 2021-01-04 PROCEDURE — 93306 TTE W/DOPPLER COMPLETE: CPT | Performed by: INTERNAL MEDICINE

## 2021-01-04 PROCEDURE — 85027 COMPLETE CBC AUTOMATED: CPT | Performed by: INTERNAL MEDICINE

## 2021-01-04 PROCEDURE — 84439 ASSAY OF FREE THYROXINE: CPT | Performed by: NURSE PRACTITIONER

## 2021-01-04 PROCEDURE — 83735 ASSAY OF MAGNESIUM: CPT | Performed by: NURSE PRACTITIONER

## 2021-01-04 PROCEDURE — 99232 SBSQ HOSP IP/OBS MODERATE 35: CPT | Performed by: HOSPITALIST

## 2021-01-04 PROCEDURE — 80048 BASIC METABOLIC PNL TOTAL CA: CPT | Performed by: INTERNAL MEDICINE

## 2021-01-04 PROCEDURE — 93005 ELECTROCARDIOGRAM TRACING: CPT

## 2021-01-04 PROCEDURE — 93010 ELECTROCARDIOGRAM REPORT: CPT | Performed by: INTERNAL MEDICINE

## 2021-01-04 RX ORDER — LOSARTAN POTASSIUM 50 MG/1
50 TABLET ORAL DAILY
Status: DISCONTINUED | OUTPATIENT
Start: 2021-01-05 | End: 2021-01-05 | Stop reason: HOSPADM

## 2021-01-04 RX ORDER — METOPROLOL TARTRATE 50 MG/1
50 TABLET, FILM COATED ORAL 3 TIMES DAILY
Status: COMPLETED | OUTPATIENT
Start: 2021-01-04 | End: 2021-01-05

## 2021-01-04 RX ORDER — ROPINIROLE 0.25 MG/1
0.5 TABLET, FILM COATED ORAL ONCE
Status: COMPLETED | OUTPATIENT
Start: 2021-01-04 | End: 2021-01-04

## 2021-01-04 RX ORDER — SOTALOL HYDROCHLORIDE 80 MG/1
160 TABLET ORAL EVERY 12 HOURS
Status: DISCONTINUED | OUTPATIENT
Start: 2021-01-04 | End: 2021-01-05

## 2021-01-04 RX ORDER — METOPROLOL TARTRATE 50 MG/1
50 TABLET, FILM COATED ORAL 3 TIMES DAILY
Status: DISCONTINUED | OUTPATIENT
Start: 2021-01-04 | End: 2021-01-04

## 2021-01-04 RX ADMIN — OXYCODONE HYDROCHLORIDE AND ACETAMINOPHEN 500 MG: 500 TABLET ORAL at 09:09

## 2021-01-04 RX ADMIN — GABAPENTIN 600 MG: 300 CAPSULE ORAL at 21:03

## 2021-01-04 RX ADMIN — SOTALOL HYDROCHLORIDE 120 MG: 80 TABLET ORAL at 09:08

## 2021-01-04 RX ADMIN — METOPROLOL TARTRATE 50 MG: 50 TABLET, FILM COATED ORAL at 21:04

## 2021-01-04 RX ADMIN — ROPINIROLE HYDROCHLORIDE 0.25 MG: 0.25 TABLET, FILM COATED ORAL at 21:03

## 2021-01-04 RX ADMIN — SOTALOL HYDROCHLORIDE 160 MG: 80 TABLET ORAL at 20:14

## 2021-01-04 RX ADMIN — METOPROLOL TARTRATE 50 MG: 50 TABLET, FILM COATED ORAL at 13:31

## 2021-01-04 RX ADMIN — ROPINIROLE HYDROCHLORIDE 0.5 MG: 0.25 TABLET, FILM COATED ORAL at 23:39

## 2021-01-04 RX ADMIN — APIXABAN 5 MG: 5 TABLET, FILM COATED ORAL at 17:34

## 2021-01-04 RX ADMIN — METOPROLOL SUCCINATE 50 MG: 50 TABLET, EXTENDED RELEASE ORAL at 09:09

## 2021-01-04 RX ADMIN — FAMOTIDINE 20 MG: 20 TABLET ORAL at 09:09

## 2021-01-04 RX ADMIN — EZETIMIBE 10 MG: 10 TABLET ORAL at 09:08

## 2021-01-04 RX ADMIN — APIXABAN 5 MG: 5 TABLET, FILM COATED ORAL at 09:09

## 2021-01-04 RX ADMIN — LOSARTAN POTASSIUM 100 MG: 50 TABLET, FILM COATED ORAL at 09:09

## 2021-01-04 RX ADMIN — ZOLPIDEM TARTRATE 10 MG: 5 TABLET ORAL at 21:30

## 2021-01-04 NOTE — ASSESSMENT & PLAN NOTE
Continue Zetia History  Chief Complaint   Patient presents with    Dental Pain     dental abscess pt reports " i have an abscess i need it popped and some abx and a work note"     71-year-old male presents emergency room for evaluation of left lower dental abscess  Onset 2 days ago  States the tooth is known to be broken for a long time recently chipped some more  Admits to previous dental abscess which required drainage in the same location  He has not been able to follow-up with dentist   Patient took pain medicine this morning without relief  Pain is constant, worsened this morning at 6:00 a m  Estuardo Manifold Denies fever  Denies difficulty swallowing or eating  Denies cough, URI symptoms  Denies travel or sick contacts  History provided by:  Patient      Prior to Admission Medications   Prescriptions Last Dose Informant Patient Reported? Taking?   escitalopram (LEXAPRO) 10 mg tablet   No Yes   Sig: Take 1 tablet by mouth daily At 9AM      Facility-Administered Medications: None       Past Medical History:   Diagnosis Date    Depression     Psychiatric illness     Self-injurious behavior     scratches on L forearm, started 2 days ago, denies previous self-injury       History reviewed  No pertinent surgical history  Family History   Problem Relation Age of Onset    Bipolar disorder Mother     Bipolar disorder Father      I have reviewed and agree with the history as documented  E-Cigarette/Vaping    E-Cigarette Use Never User      E-Cigarette/Vaping Substances    Nicotine No     THC No     CBD No     Flavoring No     Other No      Social History     Tobacco Use    Smoking status: Current Every Day Smoker     Packs/day: 0 50     Years: 1 00     Pack years: 0 50     Types: Cigarettes    Smokeless tobacco: Never Used   Substance Use Topics    Alcohol use: No    Drug use: Yes     Frequency: 1 0 times per week     Types: Marijuana       Review of Systems   Constitutional: Negative for chills and fever     HENT: Positive for dental problem  Negative for congestion, ear pain and sore throat  Respiratory: Negative for cough  Gastrointestinal: Negative for nausea  Musculoskeletal: Negative for myalgias and neck pain  Skin: Negative for rash and wound  Physical Exam  Physical Exam   Constitutional: He appears well-developed and well-nourished  HENT:   Head: Normocephalic and atraumatic  Mouth/Throat: No trismus in the jaw  Abnormal dentition  Dental abscesses present  No tonsillar exudate  Eyes: Conjunctivae are normal    Neck: Neck supple  Lymphadenopathy:     He has no cervical adenopathy  Nursing note and vitals reviewed  Vital Signs  ED Triage Vitals [07/29/20 0955]   Temperature Pulse Respirations Blood Pressure SpO2   98 1 °F (36 7 °C) 100 18 158/81 98 %      Temp Source Heart Rate Source Patient Position - Orthostatic VS BP Location FiO2 (%)   Oral Monitor -- -- --      Pain Score       --           Vitals:    07/29/20 0955   BP: 158/81   Pulse: 100         Visual Acuity      ED Medications  Medications   Lidocaine Viscous HCl (XYLOCAINE) 2 % mucosal solution 15 mL (15 mL Swish & Spit Given 7/29/20 1037)       Diagnostic Studies  Results Reviewed     None                 No orders to display              Procedures  Incision and drain  Date/Time: 7/29/2020 10:44 AM  Performed by: Neelima Sutton PA-C  Authorized by: Neelima Sutton PA-C     Patient location:  ED  Other Assisting Provider: No    Consent:     Consent obtained:  Verbal    Consent given by:  Patient    Risks discussed:  Pain, incomplete drainage and bleeding  Universal protocol:     Procedure explained and questions answered to patient or proxy's satisfaction: yes      Patient identity confirmed:  Verbally with patient  Location:     Type:  Abscess    Size:  1 5    Location:  Mouth    Location Detail:  Intraoral  Pre-procedure details:     Skin preparation:  Betadine  Anesthesia (see MAR for exact dosages):      Anesthesia method:  Topical application    Topical anesthetic:  Lidocaine gel  Procedure details:     Complexity:  Simple    Incision types:  Single straight    Scalpel blade:  11    Approach:  Open    Incision depth:  Subcutaneous    Irrigation with saline:  Swished with water    Drainage:  Purulent    Drainage amount:  Copious    Wound treatment:  Wound left open    Packing materials:  None  Post-procedure details:     Patient tolerance of procedure: Tolerated well, no immediate complications             ED Course                   JEIMY/DAST-10      Most Recent Value   How many times in the past year have you    Used an illegal drug or used a prescription medication for non-medical reasons? Never Filed at: 07/29/2020 0957                                [facility]  Number of Diagnoses or Management Options  Dental abscess:   Patient Progress  Patient progress: improved        Disposition  Final diagnoses:   Dental abscess     Time reflects when diagnosis was documented in both MDM as applicable and the Disposition within this note     Time User Action Codes Description Comment    7/29/2020 10:46 AM Maryjo Washington [K04 [id number]] Dental abscess       ED Disposition     ED Disposition Condition Date/Time Comment    Discharge Stable Wed Jul 29, 2020 10:46 AM Valentine Castro discharge to home/self care              Follow-up Information     Follow up With Specialties Details Why Contact Info Additional [address]  In 3 days  [address] [address]  [address] Emergency Department Emergency Medicine  If symptoms worsen [address]  [phone]  ED, [address] [place] [address], [address] [phone]          Patient's Medications   Discharge Prescriptions    NAPROXEN (NAPROSYN) 500 MG TABLET    Take 1 tablet (500 mg total) by mouth 2 (two) times a day with meals for 7 days       Start Date: 7/29/2020 End Date: 8/5/2020       Order Dose: 500 mg       Quantity: 14 tablet    Refills: 0    PENICILLIN V POTASSIUM (VEETID) 500 MG TABLET    Take 1 tablet (500 mg total) by mouth 4 (four) times a day for 10 days       Start Date: 7/29/2020 End Date: 8/8/2020       Order Dose: 500 mg       Quantity: 40 tablet    Refills: 0     No discharge procedures on file      PDMP Review     None          ED Provider  Electronically Signed by           Carla Emerson PA-C  07/29/20 8410

## 2021-01-04 NOTE — ASSESSMENT & PLAN NOTE
Wt Readings from Last 3 Encounters:   01/04/21 67 4 kg (148 lb 9 6 oz)   12/21/20 68 9 kg (152 lb)   12/10/20 68 9 kg (152 lb)       Appears compensated at this time    Elevated proBNP likely secondary to AFib RVR  Chest x-ray unremarkable  Last echo in 2018 with preserved EF and grade 2 diastolic dysfunction  Patient is not any diuretics at home    Plan:  · Echo pending  · Monitor daily weights and I&Os

## 2021-01-04 NOTE — ASSESSMENT & PLAN NOTE
Blood pressure is acceptable      · Continue sotalol, losartan  · Continue metoprolol 25 mg twice daily after discharge  · Monitor blood pressure

## 2021-01-04 NOTE — ASSESSMENT & PLAN NOTE
POA, palpitation and exertional SOB started 3 days prior to admission  ECG on admission showing a fib with RVR responding to 1 dose of IV lopressor 5 mg  HR rate is getting uncontrolled with minimal exertion      Plan:  · Increase metoprolol to 50 mg bid  · Checking TSH  · Checking Echo  · Cardiology recommendations  · Pt has been NPO overnight  · Will benefit from SHANNAN  · DC Toprol XL (already received 50 mg this am), will order metoprolol tartrate 50 mg TID   · Continue sotalol 120 mg b i d   · Continue Eliquis 5 mg b i d   · Maintain K + at least 4, and Mag at least 2  · Obtain free T4 level  · Monitor closely on telemetry  · Continue sotalol home dose and eliquis   · Initial troponin negative, continue to trend peak

## 2021-01-04 NOTE — ASSESSMENT & PLAN NOTE
Wt Readings from Last 3 Encounters:   01/04/21 67 4 kg (148 lb 9 6 oz)   12/21/20 68 9 kg (152 lb)   12/10/20 68 9 kg (152 lb)       Appears compensated at this time  Elevated proBNP likely secondary to AFib RVR  Chest x-ray unremarkable  Last echo in 2018 with preserved EF and grade 2 diastolic dysfunction  Patient is not any diuretics at home    Plan:  · ECHO: Systolic function was normal  Ejection fraction was estimated to be 55 %  TRICUSPID VALVE:There was mild to moderate regurgitation    · Monitor daily weights and I&Os

## 2021-01-04 NOTE — ASSESSMENT & PLAN NOTE
Blood pressure is acceptable      · Continue sotalol, losartan  · Metoprolol XL changed by Cardiology to metoprolol tartrate 50mg tid  · Monitor blood pressure

## 2021-01-04 NOTE — CONSULTS
Cardiology   Shira Fraser 67 y o  female MRN: [id number]  Unit/Bed#: S -01 Encounter: [id number]      Reason for Consult / Principal Problem:  Paroxysmal atrial fibrillation with RVR    Physician Requesting Consult:  Ria Banks MD    Cardiologist:  Dr Cunningham    Assessment  1  Paroxysmal atrial fibrillation with RVR  -Symptomatic with complaints of palpitations and JOHNSON  -12 lead ECG 1/03/2021; atrial fibrillation with RVR, rate 106 bpm  -24 hour telemetry review  -Previously on sotalol 120 mg q 12 hours, and metoprolol succinate 25 mg b i d; she was continued on sotalol 120 mg q 12 hours, and her metoprolol succinate was increased to 50 mg b i d  this admission by the internal medicine service  -TSH 2 44  -Troponin x3; <0 02  -SARs COVID 2- negative    2  Dyslipidemia  -Lipid profile 8/03/2020; cholesterol 182, triglycerides 121, HDL 54, and LDL 1 0  -On Zetia 10 mg daily    3  Hypertension  -BP last recorded 133/71, HR 75  -Currently on losartan 100 mg daily, and Toprol XL 50 mg BID (increased from 25 mg BID on admission)    4  Thyroid nodule s/p left thyroid lobectomy (12/16/2020)  -TSH 2 44    Plan  -Pt will benefit from SHANNAN(was off Eliquis for 2-3 days prior to ENT procedure)--/DCCV for rhythm control strategy and restoration to NSR-- will discuss with attending timing of procedure  -DC Toprol XL (already received 50 mg this am), will order metoprolol tartrate 50 mg TID (will receive 2 doses today and 1 tomorrow morning)  -Continue sotalol 120 mg b i d   -Continue Eliquis 5 mg b i d   -Maintain K + at least 4, and Mag at least 2  -Obtain free T4 level  -Monitor closely on telemetry    HPI: Shira Fraser 67y o  year old female with a medical history of paroxysmal atrial fibrillation, essential hypertension, dyslipidemia, thyroid nodule s/p thyroid lobectomy (12/16/2020)    She routinely follows with Dr Alta Abbott, , however she was last seen as an outpatient by Otilio Saunders on 12/21/2020  Of note patient recently underwent a left thyroid lobectomy procedure with ENT on 12/16  She reports having been off of her Eliquis for at least 2-3 days prior to the procedure  She did continue taking sotalol at 180 mg b i d   Following this she developed intermittent palpitations and had been seen in the office for further evaluation  In the office, 12 lead ECG demonstrated atrial fibrillation, rate was 112 bpm   She was continued on sotalol at 120 mg b i d , and metoprolol succinate 25 mg b i d was added to her home regimen  She was continued on Eliquis 5 mg b i d  For systemic anticoagulation  She did have outpatient TSH/free T4 level ordered  She [facility] to [facility] on [facility]/03/2020 with complaints of palpitations and dyspnea with exertion  Further workup in the ED  Hemodynamics on admission  Temp 98 1° F, , RR 18, /84, sat 99% on RA  Laboratory data on admission  NA +137, K +4 4, chloride 104, CO2 28, anion gap 5, BUN 29, creatinine 1 02, glucose 141, calcium 9 9, AST 20, ALT 30, alk-phos 65, albumin 3 6, T bili 1 04, WBC 6 1, HGB 13 7, and platelet count 463  Troponin x3; <0 02; NT proBNP 2773  Imaging  Chest x-ray; no acute cardiopulmonary disease    Initial 12 lead ECG demonstrated atrial fibrillation with RVR  She was resumed on her home sotalol 120 mg b i d , and her metoprolol succinate was up titrated to 50 mg b i d  She was continued on Eliquis 5 mg b i d  for systemic anticoagulation  Cardiology was consulted for further treatment recommendations/management        Family History:   Family History   Problem Relation Age of Onset    Heart disease Mother     Diabetes Mother     Heart disease Father     Coronary artery disease Father     Stroke Father         cerebrovascular accident   Roanna Kugel Heart attack Father         myocardial infarction   Roanna Kugel Sudden death Father         scd    Other Family         Back disorder    Coronary artery disease Family     Neuropathy Family     Osteoporosis Family     No Known Problems Daughter     No Known Problems Maternal Grandmother     No Known Problems Maternal Grandfather     No Known Problems Paternal Grandmother     No Known Problems Paternal Grandfather     Cancer Maternal Uncle     Breast cancer Maternal Aunt 72    No Known Problems Son     No Known Problems Maternal Aunt     No Known Problems Maternal Aunt     No Known Problems Maternal Aunt     No Known Problems Paternal Aunt     No Known Problems Paternal Aunt     Anuerysm Neg Hx     Clotting disorder Neg Hx     Arrhythmia Neg Hx     Heart failure Neg Hx      Historical Information   Past Medical History:   Diagnosis Date    Actinic keratosis     last assessed - [id number]    Arthritis     Atrial fibrillation with rapid ventricular response (HCC)     last assessed - [id number]    Basal cell carcinoma     Benign colon polyp     last assessed - [id number]    Disease of thyroid gland     Effusion of knee joint right     Resolved - [id number]    Esophageal reflux     Fibromyalgia     last assessed - [id number]    Fibromyalgia, primary     GERD (gastroesophageal reflux disease)     Hypertension     Palpitations     last assessed - [id number]    Peroneal tendonitis, right     last assessed - [id number]    Right lumbar radiculopathy 3/17/2016    Thyroid nodule     Trochanteric bursitis of left hip 3/9/2018     Past Surgical History:   Procedure Laterality Date    CATARACT EXTRACTION Bilateral     COLONOSCOPY  03/2018    EYE SURGERY      HYSTERECTOMY      JOINT REPLACEMENT Left     knee    CO REVISE MEDIAN N/CARPAL TUNNEL SURG Right 11/14/2019    Procedure: RELEASE CARPAL TUNNEL;  Surgeon: Derick Crawford MD;  [facility];  Service: Orthopedics    CO THYROID LOBECTOMY,UNILAT Left 12/16/2020    Procedure: Left THYROID LOBECTOMY;  Surgeon: Jayne Alvarez MD;  [facility];  Service: ENT    RECTAL POLYPECTOMY      REPLACEMENT TOTAL KNEE Left     last assessed - [id number]    TONSILLECTOMY      TOTAL ABDOMINAL HYSTERECTOMY      TUBAL LIGATION      US GUIDED THYROID BIOPSY  10/14/2020     Social History   Social History     Substance and Sexual Activity   Alcohol Use Not Currently    Drinks per session: 1 or 2    Comment: occosional - every 3 months     Social History     Substance and Sexual Activity   Drug Use Never     Social History     Tobacco Use   Smoking Status Never Smoker   Smokeless Tobacco Never Used     Family History:   Family History   Problem Relation Age of Onset    Heart disease Mother     Diabetes Mother     Heart disease Father     Coronary artery disease Father     Stroke Father         cerebrovascular accident    Heart attack Father         myocardial infarction    Sudden death Father         scd    Other Family         Back disorder    Coronary artery disease Family     Neuropathy Family     Osteoporosis Family     No Known Problems Daughter     No Known Problems Maternal Grandmother     No Known Problems Maternal Grandfather     No Known Problems Paternal Grandmother     No Known Problems Paternal Grandfather     Cancer Maternal Uncle     Breast cancer Maternal Aunt 72    No Known Problems Son     No Known Problems Maternal Aunt     No Known Problems Maternal Aunt     No Known Problems Maternal Aunt     No Known Problems Paternal Aunt     No Known Problems Paternal Aunt     Anuerysm Neg Hx     Clotting disorder Neg Hx     Arrhythmia Neg Hx     Heart failure Neg Hx        Review of Systems:  Review of Systems   Constitutional: Negative for chills, fatigue and fever  HENT: Negative for congestion  Eyes: Negative for visual disturbance  Respiratory: Negative for cough, chest tightness and shortness of breath  Cardiovascular: Positive for palpitations  Negative for chest pain and leg swelling  Gastrointestinal: Negative for abdominal pain     Genitourinary: Negative for difficulty urinating and dysuria  Musculoskeletal: Negative for arthralgias and myalgias  Neurological: Negative for dizziness, light-headedness and headaches  All other systems reviewed and are negative  Scheduled Meds:  Current Facility-Administered Medications   Medication Dose Route Frequency Provider Last Rate    apixaban  5 mg Oral BID Sofia Mann MD      ascorbic acid  500 mg Oral Daily Sofia Mann MD      ezetimibe  10 mg Oral Daily Sofia Mann MD      famotidine  20 mg Oral Daily Sofia Mann MD      gabapentin  600 mg Oral HS Sofia Mann MD      losartan  100 mg Oral Daily Sofia Mann MD      metoprolol  5 mg Intravenous Q8H PRN Sofia Mann MD      metoprolol succinate  50 mg Oral Q12H Michelle Rowland MD      rOPINIRole  0 25 mg Oral HS Sofia Mann MD      sotalol  120 mg Oral Q12H Michelle Rowland MD      zolpidem  10 mg Oral HS PRN Sofia Mann MD       Continuous Infusions:   PRN Meds: metoprolol    zolpidem  all current active meds have been reviewed, current meds:   Current Facility-Administered Medications   Medication Dose Route Frequency    apixaban (ELIQUIS) tablet 5 mg  5 mg Oral BID    ascorbic acid (VITAMIN C) tablet 500 mg  500 mg Oral Daily    ezetimibe (ZETIA) tablet 10 mg  10 mg Oral Daily    famotidine (PEPCID) tablet 20 mg  20 mg Oral Daily    gabapentin (NEURONTIN) capsule 600 mg  600 mg Oral HS    losartan (COZAAR) tablet 100 mg  100 mg Oral Daily    metoprolol (LOPRESSOR) injection 5 mg  5 mg Intravenous Q8H PRN    metoprolol succinate (TOPROL-XL) 24 hr tablet 50 mg  50 mg Oral Q12H    rOPINIRole (REQUIP) tablet 0 25 mg  0 25 mg Oral HS    sotalol (BETAPACE) tablet 120 mg  120 mg Oral Q12H    zolpidem (AMBIEN) tablet 10 mg  10 mg Oral HS PRN    and PTA meds:   Prior to Admission Medications   Prescriptions Last Dose Informant Patient Reported? Taking?    Cholecalciferol ([employer] VITAMIN D) 2000 UNITS CAPS  Self Yes No   Sig: Take 2,000 Units by mouth 2 (two) times a day     Chromium Picolinate (CHROMIUM PICOLATE PO)  Self Yes No   Sig: Take 1 tablet by mouth daily   TURMERIC PO  Self Yes No   Sig: Take 1 capsule by mouth 2 (two) times a day   acetaminophen (TYLENOL) 650 mg CR tablet  Self No No   Sig: Take 1 tablet (650 mg total) by mouth every 6 (six) hours as needed for mild pain or moderate pain   apixaban (ELIQUIS) 5 mg  Self No No   Sig: Take 1 tablet (5 mg total) by mouth 2 (two) times a day   ascorbic acid (VITAMIN C) 500 mg tablet  Self Yes No   Sig: Take 500 mg by mouth daily    ezetimibe (ZETIA) 10 mg tablet  Self No No   Sig: TAKE 1 TABLET BY MOUTH  DAILY   famotidine (PEPCID) 20 mg tablet  Self Yes No   Sig: Take 20 mg by mouth daily     gabapentin (NEURONTIN) 300 mg capsule  Self No No   Sig: TAKE 2 CAPSULES BY MOUTH AT BEDTIME   losartan (COZAAR) 100 MG tablet  Self No No   Sig: TAKE 1 TABLET BY MOUTH  DAILY   metoprolol succinate (TOPROL-XL) 25 mg 24 hr tablet   No No   Sig: Take 1 tablet (25 mg total) by mouth every 12 (twelve) hours   oxyCODONE (ROXICODONE) 5 mg immediate release tablet  Self No No   Sig: Take 1 tablet (5 mg total) by mouth every 6 (six) hours as needed for severe painMax Daily Amount: 20 mg   rOPINIRole (REQUIP) 0 25 mg tablet  Self No No   Sig: TAKE 1 TABLET BY MOUTH  DAILY AT BEDTIME   sotalol (BETAPACE) 120 mg tablet   No No   Sig: Take 1 tablet (120 mg total) by mouth every 12 (twelve) hours   traMADol (ULTRAM) 50 mg tablet  Self No No   Sig: TAKE 1 TABLET BY MOUTH  TWICE DAILY AS NEEDED FOR  MODERATE PAIN   zolpidem (AMBIEN) 10 mg tablet  Self No No   Sig: Take 1 tablet (10 mg total) by mouth daily at bedtime as needed for sleep      Facility-Administered Medications: None       Allergies   Allergen Reactions    Penicillins Other (See Comments)     As a child calcium deposit in the arm     Ace Inhibitors GI Intolerance     Did feel well on it       Objective   Vitals: Blood pressure 133/71, pulse 75, temperature 97 9 °F (36 6 °C), resp  rate 16, weight 67 4 kg (148 lb 9 6 oz), last menstrual period 02/01/1990, SpO2 97 %  , Body mass index is 27 18 kg/m² ,   Orthostatic Blood Pressures      Most Recent Value   Blood Pressure  133/71 filed at 01/04/2021 0700   Patient Position - Orthostatic VS  Lying filed at 01/04/2021 0700          Intake/Output Summary (Last 24 hours) at 1/4/2021 0945  Last data filed at 1/3/2021 2001  Gross per 24 hour   Intake 100 ml   Output    Net 100 ml       Invasive Devices     Peripheral Intravenous Line            Peripheral IV 01/03/21 Right Antecubital less than 1 day              Physical Exam:  Physical Exam  Vitals signs and nursing note reviewed  Constitutional:       General: She is not in acute distress  Appearance: Normal appearance  She is not ill-appearing  HENT:      Head: Normocephalic and atraumatic  Mouth/Throat:      Mouth: Mucous membranes are moist    Eyes:      General: No scleral icterus  Neck:      Musculoskeletal: Normal range of motion and neck supple  Cardiovascular:      Rate and Rhythm: Tachycardia present  Rhythm irregular  Pulses: Normal pulses  Heart sounds: Normal heart sounds  No murmur  Pulmonary:      Effort: Pulmonary effort is normal       Breath sounds: Normal breath sounds  No wheezing or rales  Abdominal:      Palpations: Abdomen is soft  Musculoskeletal:      Right lower leg: No edema  Left lower leg: No edema  Skin:     General: Skin is warm and dry  Capillary Refill: Capillary refill takes less than 2 seconds  Neurological:      General: No focal deficit present  Mental Status: She is alert and oriented to person, place, and time     Psychiatric:         Mood and Affect: Mood normal          Lab Results:   Recent Results (from the past 24 hour(s))   ECG 12 lead    Collection Time: 01/03/21 10:14 AM   Result Value Ref Range    Ventricular Rate 106 BPM    Atrial Rate 132 BPM    OK Interval  ms    QRSD Interval 84 ms    QT Interval 356 ms    QTC Interval 473 ms    P Axis  degrees    QRS Axis 92 degrees    T Wave Axis 217 degrees   CBC and differential    Collection Time: 01/03/21 10:37 AM   Result Value Ref Range    WBC 6 05 4 31 - 10 16 Thousand/uL    RBC 4 28 3 81 - 5 12 Million/uL    Hemoglobin 13 7 11 5 - 15 4 g/dL    Hematocrit 41 8 34 8 - 46 1 %    MCV 98 82 - 98 fL    MCH 32 0 26 8 - 34 3 pg    MCHC 32 8 31 4 - 37 4 g/dL    RDW 13 3 11 6 - 15 1 %    MPV 10 2 8 9 - 12 7 fL    Platelets 033 001 - 869 Thousands/uL    nRBC 0 /100 WBCs    Neutrophils Relative 53 43 - 75 %    Immat GRANS % 0 0 - 2 %    Lymphocytes Relative 32 14 - 44 %    Monocytes Relative 11 4 - 12 %    Eosinophils Relative 3 0 - 6 %    Basophils Relative 1 0 - 1 %    Neutrophils Absolute 3 27 1 85 - 7 62 Thousands/µL    Immature Grans Absolute 0 02 0 00 - 0 20 Thousand/uL    Lymphocytes Absolute 1 92 0 60 - 4 47 Thousands/µL    Monocytes Absolute 0 64 0 17 - 1 22 Thousand/µL    Eosinophils Absolute 0 15 0 00 - 0 61 Thousand/µL    Basophils Absolute 0 05 0 00 - 0 10 Thousands/µL   Comprehensive metabolic panel    Collection Time: 01/03/21 10:37 AM   Result Value Ref Range    Sodium 137 136 - 145 mmol/L    Potassium 4 4 3 5 - 5 3 mmol/L    Chloride 104 100 - 108 mmol/L    CO2 28 21 - 32 mmol/L    ANION GAP 5 4 - 13 mmol/L    BUN 29 (H) 5 - 25 mg/dL    Creatinine 1 02 0 60 - 1 30 mg/dL    Glucose 141 (H) 65 - 140 mg/dL    Calcium 9 9 8 3 - 10 1 mg/dL    AST 20 5 - 45 U/L    ALT 30 12 - 78 U/L    Alkaline Phosphatase 65 46 - 116 U/L    Total Protein 7 1 6 4 - 8 2 g/dL    Albumin 3 6 3 5 - 5 0 g/dL    Total Bilirubin 1 04 (H) 0 20 - 1 00 mg/dL    eGFR 54 ml/min/1 73sq m   Troponin I    Collection Time: 01/03/21 10:37 AM   Result Value Ref Range    Troponin I <0 02 <=0 04 ng/mL   NT-BNP PRO    Collection Time: 01/03/21 10:37 AM   Result Value Ref Range    NT-proBNP 2,773 (H) <450 pg/mL   TSH, 3rd generation Collection Time: 01/03/21 10:37 AM   Result Value Ref Range    TSH 3RD GENERATON 2 444 0 358 - 3 740 uIU/mL   Troponin I    Collection Time: 01/03/21  1:25 PM   Result Value Ref Range    Troponin I <0 02 <=0 04 ng/mL   COVID19, Influenza A/B, RSV PCR, [facility]    Collection Time: 01/03/21  3:20 PM    Specimen: Nasopharyngeal Swab; Nares   Result Value Ref Range    SARS-CoV-2 Negative Negative    INFLUENZA A PCR Negative Negative    INFLUENZA B PCR Negative Negative    RSV PCR Negative Negative   Troponin I    Collection Time: 01/03/21  4:36 PM   Result Value Ref Range    Troponin I <0 02 <=0 04 ng/mL   Basic metabolic panel    Collection Time: 01/04/21  5:24 AM   Result Value Ref Range    Sodium 140 136 - 145 mmol/L    Potassium 4 2 3 5 - 5 3 mmol/L    Chloride 104 100 - 108 mmol/L    CO2 29 21 - 32 mmol/L    ANION GAP 7 4 - 13 mmol/L    BUN 29 (H) 5 - 25 mg/dL    Creatinine 0 97 0 60 - 1 30 mg/dL    Glucose 107 65 - 140 mg/dL    Calcium 9 4 8 3 - 10 1 mg/dL    eGFR 57 ml/min/1 73sq m   CBC (With Platelets)    Collection Time: 01/04/21  5:24 AM   Result Value Ref Range    WBC 5 81 4 31 - 10 16 Thousand/uL    RBC 4 36 3 81 - 5 12 Million/uL    Hemoglobin 13 9 11 5 - 15 4 g/dL    Hematocrit 42 6 34 8 - 46 1 %    MCV 98 82 - 98 fL    MCH 31 9 26 8 - 34 3 pg    MCHC 32 6 31 4 - 37 4 g/dL    RDW 13 2 11 6 - 15 1 %    Platelets 770 414 - 986 Thousands/uL    MPV 10 2 8 9 - 12 7 fL     Imaging: I have personally reviewed pertinent reports  and I have personally reviewed pertinent films in PACS  Code Status:  Level 1 full code  Epic/ Allscripts/Care Everywhere records reviewed:  Yes    * Please Note: Fluency DirectDictation voice to text software may have been used in the creation of this document   **

## 2021-01-04 NOTE — PLAN OF CARE
Problem: PAIN - ADULT  Goal: Verbalizes/displays adequate comfort level or baseline comfort level  Description: Interventions:  - Encourage patient to monitor pain and request assistance  - Assess pain using appropriate pain scale  - Administer analgesics based on type and severity of pain and evaluate response  - Implement non-pharmacological measures as appropriate and evaluate response  - Consider cultural and social influences on pain and pain management  - Notify physician/advanced practitioner if interventions unsuccessful or patient reports new pain  Outcome: Progressing     Problem: INFECTION - ADULT  Goal: Absence or prevention of progression during hospitalization  Description: INTERVENTIONS:  - Assess and monitor for signs and symptoms of infection  - Monitor lab/diagnostic results  - Monitor all insertion sites, i e  indwelling lines, tubes, and drains  - Monitor endotracheal if appropriate and nasal secretions for changes in amount and color  - [place] appropriate cooling/warming therapies per order  - Administer medications as ordered  - Instruct and encourage patient and family to use good hand hygiene technique  - Identify and instruct in appropriate isolation precautions for identified infection/condition  Outcome: Progressing  Goal: Absence of fever/infection during neutropenic period  Description: INTERVENTIONS:  - Monitor WBC    Outcome: Progressing     Problem: SAFETY ADULT  Goal: Patient will remain free of falls  Description: INTERVENTIONS:  - Assess patient frequently for physical needs  -  Identify cognitive and physical deficits and behaviors that affect risk of falls    -  [place] fall precautions as indicated by assessment   - Educate patient/family on patient safety including physical limitations  - Instruct patient to call for assistance with activity based on assessment  - Modify environment to reduce risk of injury  - Consider OT/PT consult to assist with strengthening/mobility  Outcome: Progressing  Goal: Maintain or return to baseline ADL function  Description: INTERVENTIONS:  -  Assess patient's ability to carry out ADLs; assess patient's baseline for ADL function and identify physical deficits which impact ability to perform ADLs (bathing, care of mouth/teeth, toileting, grooming, dressing, etc )  - Assess/evaluate cause of self-care deficits   - Assess range of motion  - Assess patient's mobility; develop plan if impaired  - Assess patient's need for assistive devices and provide as appropriate  - Encourage maximum independence but intervene and supervise when necessary  - Involve family in performance of ADLs  - Assess for home care needs following discharge   - Consider OT consult to assist with ADL evaluation and planning for discharge  - Provide patient education as appropriate  Outcome: Progressing  Goal: Maintain or return mobility status to optimal level  Description: INTERVENTIONS:  - Assess patient's baseline mobility status (ambulation, transfers, stairs, etc )    - Identify cognitive and physical deficits and behaviors that affect mobility  - Identify mobility aids required to assist with transfers and/or ambulation (gait belt, sit-to-stand, lift, walker, cane, etc )  - [place] fall precautions as indicated by assessment  - Record patient progress and toleration of activity level on Mobility SBAR; progress patient to next Phase/Stage  - Instruct patient to call for assistance with activity based on assessment  - Consider rehabilitation consult to assist with strengthening/weightbearing, etc   Outcome: Progressing     Problem: DISCHARGE PLANNING  Goal: Discharge to home or other facility with appropriate resources  Description: INTERVENTIONS:  - Identify barriers to discharge w/patient and caregiver  - Arrange for needed discharge resources and transportation as appropriate  - Identify discharge learning needs (meds, wound care, etc )  - Refer to Case Management Department for coordinating discharge planning if the patient needs post-hospital services based on physician/advanced practitioner order or complex needs related to functional status, cognitive ability, or social support system  Outcome: Progressing     Problem: CARDIOVASCULAR - ADULT  Goal: Maintains optimal cardiac output and hemodynamic stability  Description: INTERVENTIONS:  - Monitor I/O, vital signs and rhythm  - Monitor for S/S and trends of decreased cardiac output  - Administer and titrate ordered vasoactive medications to optimize hemodynamic stability  - Assess quality of pulses, skin color and temperature  - Assess for signs of decreased coronary artery perfusion  - Instruct patient to report change in severity of symptoms  Outcome: Progressing  Goal: Absence of cardiac dysrhythmias or at baseline rhythm  Description: INTERVENTIONS:  - Continuous cardiac monitoring, vital signs, obtain 12 lead EKG if ordered  - Administer antiarrhythmic and heart rate control medications as ordered  - Monitor electrolytes and administer replacement therapy as ordered  Outcome: Progressing

## 2021-01-04 NOTE — ASSESSMENT & PLAN NOTE
POA, palpitation and exertional SOB started 3 days prior to admission  ECG on admission showing a fib with RVR responding to 1 dose of IV lopressor 5 mg  HR rate is getting uncontrolled with minimal exertion  Plan:  · Increase metoprolol to 50 mg bid  · TSH normal  · ECHO: Systolic function was normal  Ejection fraction was estimated to be 55 %  TRICUSPID VALVE:There was mild to moderate regurgitation  · Cardiology recommendations  · Converted yesterday to normal sinus  Her most recent ECG has a prolonged QTC out to about 520-525 ms  Therefore we will use the lower dose of sotalol at 120 mg twice daily  Continue metoprolol 25 mg twice daily which was started in the outpatient setting  Continue Eliquis for stroke prevention  We will arrange follow-up with both her primary cardiologist and electrophysiology

## 2021-01-04 NOTE — PROGRESS NOTES
Progress Note - Jasper Laurent 1945, 67 y o  female MRN: [id number]    Unit/Bed#: S -01 Encounter: [id number]    Primary Care Provider: Sherman Rahman MD   Date and time admitted to hospital: 1/3/2021 10:14 AM        * Atrial fibrillation with rapid ventricular response (HCC)  Assessment & Plan  POA, palpitation and exertional SOB started 3 days prior to admission  ECG on admission showing a fib with RVR responding to 1 dose of IV lopressor 5 mg  HR rate is getting uncontrolled with minimal exertion  Plan:  · Increase metoprolol to 50 mg bid  · Checking TSH  · Checking Echo  · Cardiology recommendations  · Pt has been NPO overnight  · Will benefit from SHANNAN  · DC Toprol XL (already received 50 mg this am), will order metoprolol tartrate 50 mg TID   · Continue sotalol 120 mg b i d   · Continue Eliquis 5 mg b i d   · Maintain K + at least 4, and Mag at least 2  · Obtain free T4 level  · Monitor closely on telemetry  · Continue sotalol home dose and eliquis   · Initial troponin negative, continue to trend peak    Chronic diastolic CHF (congestive heart failure) (HCC)  Assessment & Plan  Wt Readings from Last 3 Encounters:   01/04/21 67 4 kg (148 lb 9 6 oz)   12/21/20 68 9 kg (152 lb)   12/10/20 68 9 kg (152 lb)       Appears compensated at this time  Elevated proBNP likely secondary to AFib RVR  Chest x-ray unremarkable  Last echo in 2018 with preserved EF and grade 2 diastolic dysfunction  Patient is not any diuretics at home    Plan:  · Echo pending  · Monitor daily weights and I&Os      Hyperlipidemia  Assessment & Plan  Continue Zetia    Essential hypertension  Assessment & Plan  Blood pressure is acceptable      · Continue sotalol, losartan  · Metoprolol XL changed by Cardiology to metoprolol tartrate 50mg tid  · Monitor blood pressure      VTE Pharmacologic Prophylaxis:   Pharmacologic: Apixaban (Eliquis)  Mechanical VTE Prophylaxis in Place: Yes    Discussions with Specialists or Other Care Team Provider:  Cardiology    Education and Discussions with Family / Patient:  Pt would like her daughter contacted after her cardioversion procedure pt states her daughter is very busy during the day  Plan was explained to the pt and all of her questions were answered    Current Length of Stay: 1 day(s)    Current Patient Status: Inpatient     Discharge Plan / Estimated Discharge Date:  24-48 hours    Code Status: Level 1 - Full Code      Subjective:   Pt was seen and evaluated at bedside, no acute events overnight  She reports that she feels much better today, less trouble breathing  She does not complain pain, no abdominal pain, no chest pain  Pt does report she feels palpitations occasionally  Pt knows that SHANNAN with cardioversion will most likely take place today  Objective:     Vitals:   Temp (24hrs), Av 1 °F (36 7 °C), Min:97 9 °F (36 6 °C), Max:98 3 °F (36 8 °C)    Temp:  [97 9 °F (36 6 °C)-98 3 °F (36 8 °C)] 97 9 °F (36 6 °C)  HR:  [] 75  Resp:  [16-18] 16  BP: (119-179)/() 133/71  SpO2:  [95 %-98 %] 97 %  Body mass index is 27 18 kg/m²  Input and Output Summary (last 24 hours): Intake/Output Summary (Last 24 hours) at 2021 1146  Last data filed at 1/3/2021 2001  Gross per 24 hour   Intake 100 ml   Output    Net 100 ml       Physical Exam:     Physical Exam  Vitals signs and nursing note reviewed  Constitutional:       General: She is not in acute distress  Appearance: Normal appearance  She is obese  She is not ill-appearing, toxic-appearing or diaphoretic  HENT:      Head: Normocephalic and atraumatic  Right Ear: External ear normal       Left Ear: External ear normal       Nose: Nose normal    Eyes:      General: No scleral icterus  Right eye: No discharge  Left eye: No discharge  Conjunctiva/sclera: Conjunctivae normal    Neck:      Musculoskeletal: Normal range of motion  Cardiovascular:      Rate and Rhythm: Tachycardia present   Rhythm irregularly irregular  Pulses: Normal pulses  Dorsalis pedis pulses are 2+ on the right side and 2+ on the left side  Heart sounds: No murmur  No friction rub  Pulmonary:      Effort: Pulmonary effort is normal  No respiratory distress  Breath sounds: Normal breath sounds  No stridor  No wheezing, rhonchi or rales  Chest:      Chest wall: No tenderness  Abdominal:      General: Abdomen is flat  Bowel sounds are normal  There is no distension  Palpations: Abdomen is soft  There is no mass  Tenderness: There is no abdominal tenderness  There is no guarding or rebound  Hernia: No hernia is present  Musculoskeletal: Normal range of motion  General: No swelling, tenderness, deformity or signs of injury  Right lower leg: No edema  Left lower leg: No edema  Skin:     General: Skin is warm  Coloration: Skin is not jaundiced or pale  Findings: No bruising, erythema, lesion or rash  Neurological:      General: No focal deficit present  Mental Status: She is alert and oriented to person, place, and time  Psychiatric:         Mood and Affect: Mood normal        Additional Data:     Labs:    Results from last 7 days   Lab Units 01/04/21  0524 01/03/21  1037   WBC Thousand/uL 5 81 6 05   HEMOGLOBIN g/dL 13 9 13 7   HEMATOCRIT % 42 6 41 8   PLATELETS Thousands/uL 263 264   NEUTROS PCT %  --  53   LYMPHS PCT %  --  32   MONOS PCT %  --  11   EOS PCT %  --  3     Results from last 7 days   Lab Units 01/04/21  0524 01/03/21  1037   POTASSIUM mmol/L 4 2 4 4   CHLORIDE mmol/L 104 104   CO2 mmol/L 29 28   BUN mg/dL 29* 29*   CREATININE mg/dL 0 97 1 02   CALCIUM mg/dL 9 4 9 9   ALK PHOS U/L  --  65   ALT U/L  --  30   AST U/L  --  20           * I Have Reviewed All Lab Data Listed Above  * Additional Pertinent Lab Tests Reviewed:  All Labs For Current Hospital Admission Reviewed    Imaging:    Imaging Reports Reviewed Today Include:  Chest x-ray unremarkable  Imaging Personally Reviewed by Myself Includes:  None    Recent Cultures (last 7 days):           Last 24 Hours Medication List:   Current Facility-Administered Medications   Medication Dose Route Frequency Provider Last Rate    apixaban  5 mg Oral BID Helen Davalos MD      ascorbic acid  500 mg Oral Daily Helen Davalso MD      ezetimibe  10 mg Oral Daily Helen Davalos MD      famotidine  20 mg Oral Daily Helen Davalos MD      gabapentin  600 mg Oral HS Helen Davalos MD      losartan  100 mg Oral Daily Helen Davalos MD      metoprolol  5 mg Intravenous Q8H PRN Helen Davalos MD      metoprolol tartrate  50 mg Oral TID JENNY Tran      rOPINIRole  0 25 mg Oral HS Helen Davalos MD      sotalol  120 mg Oral Q12H Nicole Barraza MD      zolpidem  10 mg Oral HS PRN Helen Davalos MD          Today, Patient Was Seen By: Geovani Carter DO    ** Please Note: This note has been constructed using a voice recognition system   **

## 2021-01-05 VITALS
SYSTOLIC BLOOD PRESSURE: 113 MMHG | HEART RATE: 52 BPM | TEMPERATURE: 97.8 F | RESPIRATION RATE: 18 BRPM | DIASTOLIC BLOOD PRESSURE: 55 MMHG | BODY MASS INDEX: 27.47 KG/M2 | OXYGEN SATURATION: 98 % | WEIGHT: 150.2 LBS

## 2021-01-05 LAB
ANION GAP SERPL CALCULATED.3IONS-SCNC: 10 MMOL/L (ref 4–13)
ATRIAL RATE: 55 BPM
BUN SERPL-MCNC: 39 MG/DL (ref 5–25)
CALCIUM SERPL-MCNC: 9.2 MG/DL (ref 8.3–10.1)
CHLORIDE SERPL-SCNC: 102 MMOL/L (ref 100–108)
CO2 SERPL-SCNC: 24 MMOL/L (ref 21–32)
CREAT SERPL-MCNC: 1.12 MG/DL (ref 0.6–1.3)
GFR SERPL CREATININE-BSD FRML MDRD: 48 ML/MIN/1.73SQ M
GLUCOSE SERPL-MCNC: 100 MG/DL (ref 65–140)
MAGNESIUM SERPL-MCNC: 2.1 MG/DL (ref 1.6–2.6)
P AXIS: 32 DEGREES
POTASSIUM SERPL-SCNC: 4.1 MMOL/L (ref 3.5–5.3)
PR INTERVAL: 174 MS
QRS AXIS: 49 DEGREES
QRSD INTERVAL: 92 MS
QT INTERVAL: 554 MS
QTC INTERVAL: 529 MS
SODIUM SERPL-SCNC: 136 MMOL/L (ref 136–145)
T WAVE AXIS: 23 DEGREES
VENTRICULAR RATE: 55 BPM

## 2021-01-05 PROCEDURE — 99232 SBSQ HOSP IP/OBS MODERATE 35: CPT | Performed by: INTERNAL MEDICINE

## 2021-01-05 PROCEDURE — 83735 ASSAY OF MAGNESIUM: CPT | Performed by: NURSE PRACTITIONER

## 2021-01-05 PROCEDURE — 93010 ELECTROCARDIOGRAM REPORT: CPT | Performed by: INTERNAL MEDICINE

## 2021-01-05 PROCEDURE — 80048 BASIC METABOLIC PNL TOTAL CA: CPT | Performed by: NURSE PRACTITIONER

## 2021-01-05 PROCEDURE — 93005 ELECTROCARDIOGRAM TRACING: CPT

## 2021-01-05 PROCEDURE — 99239 HOSP IP/OBS DSCHRG MGMT >30: CPT | Performed by: INTERNAL MEDICINE

## 2021-01-05 RX ORDER — LOSARTAN POTASSIUM 50 MG/1
50 TABLET ORAL DAILY
Qty: 90 TABLET | Refills: 0 | Status: SHIPPED | OUTPATIENT
Start: 2021-01-06 | End: 2021-04-05 | Stop reason: SDUPTHER

## 2021-01-05 RX ORDER — SOTALOL HYDROCHLORIDE 80 MG/1
120 TABLET ORAL EVERY 12 HOURS
Status: DISCONTINUED | OUTPATIENT
Start: 2021-01-05 | End: 2021-01-05 | Stop reason: HOSPADM

## 2021-01-05 RX ORDER — METOPROLOL SUCCINATE 25 MG/1
25 TABLET, EXTENDED RELEASE ORAL DAILY
Status: DISCONTINUED | OUTPATIENT
Start: 2021-01-05 | End: 2021-01-05

## 2021-01-05 RX ADMIN — EZETIMIBE 10 MG: 10 TABLET ORAL at 08:20

## 2021-01-05 RX ADMIN — METOPROLOL TARTRATE 50 MG: 50 TABLET, FILM COATED ORAL at 08:21

## 2021-01-05 RX ADMIN — SOTALOL HYDROCHLORIDE 160 MG: 80 TABLET ORAL at 08:21

## 2021-01-05 RX ADMIN — OXYCODONE HYDROCHLORIDE AND ACETAMINOPHEN 500 MG: 500 TABLET ORAL at 08:21

## 2021-01-05 RX ADMIN — LOSARTAN POTASSIUM 50 MG: 50 TABLET, FILM COATED ORAL at 08:23

## 2021-01-05 RX ADMIN — APIXABAN 5 MG: 5 TABLET, FILM COATED ORAL at 08:20

## 2021-01-05 RX ADMIN — FAMOTIDINE 20 MG: 20 TABLET ORAL at 08:20

## 2021-01-05 NOTE — DISCHARGE INSTR - AVS FIRST PAGE
Dear Louis Salgado,     It was our pleasure to care for you here at [facility], [address]  It is our hope that we were always able to exceed the expected standards for your care during your stay  You were hospitalized due to rapid heart rate and irregular heart rhythm  You were cared for on the floor by Efra Valladares DO under the service of Kurtis Kerns DO with the [facility] who covers for your primary care physician (PCP), Sharri Chavez MD, while you were hospitalized  If you have any questions or concerns related to this hospitalization, you may contact us at [phone]  For follow up as well as any medication refills, we recommend that you follow up with your primary care physician  A registered nurse will reach out to you by phone within a few days after your discharge to answer any additional questions that you may have after going home  However, at this time we provide for you here, the most important instructions / recommendations at discharge:     · Notable Medication Adjustments -   · Please continue taking 120 mg of sotalol twice a day  · Please continue taking metoprolol tartrate (Lopressor) 25 mg twice a day  · Please decrease your dose of losartan 50 mg daily  · Please continue taking Eliquis 5mg bid  · Testing Required after Discharge -   · None  · Important follow up information -   · Please follow-up with your cardiologist: Cardiology has made you an appointment for 1/26/2021 at 11:30 a m  · Other Instructions -   · Please continue to protect yourself from COVID infection by wearing a mask, washing your hands thoroughly,and socially distancing  · If your heart rate is elevated (over 99 beats per minute) then you may be in an irregular heart rhythm again  If your heart beat is irregular and does not sound like a "steady drum" then that can also be a sign that you are in Afib   Please contact your PCP or cardiologist if you have these symptoms  If you can not reach them, then go to the ER  · If you continue to have sharp chest pains please call your cardiologist or PCP to discuss further  · Please review this entire after visit summary as additional general instructions including medication list, appointments, activity, diet, any pertinent wound care, and other additional recommendations from your care team that may be provided for you        Sincerely,     Tony Moreira, DO

## 2021-01-05 NOTE — PROGRESS NOTES
Cardiology Progress Note - Inder Dc 67 y o  female MRN: [id number]    Unit/Bed#: S -01 Encounter: [id number]      Assessment:  Principal Problem:    Atrial fibrillation with rapid ventricular response ([facility] )  Active Problems:    Essential hypertension    Hyperlipidemia    Chronic diastolic CHF (congestive heart failure) ([facility] )      Plan:    1  Paroxysmal atrial fibrillation - Hernandez Last is now back in sinus rhythm  Her most recent ECG has a prolonged QTC out to about 520-525 ms  Therefore we will use the lower dose of sotalol at 120 mg twice daily  Continue metoprolol 25 mg twice daily which was started in the outpatient setting  Continue Eliquis for stroke prevention  We will arrange follow-up with both her primary cardiologist and electrophysiology  Patient can be discharged from a cardiac perspective  Subjective:   Patient seen and examined  No significant events overnight  Patient converted to sinus rhythm later yesterday  Objective:     Vitals: Blood pressure 113/55, pulse (!) 52, temperature 97 8 °F (36 6 °C), temperature source Oral, resp  rate 18, weight 68 1 kg (150 lb 3 2 oz), last menstrual period 02/01/1990, SpO2 98 %  , Body mass index is 27 47 kg/m² ,   Orthostatic Blood Pressures      Most Recent Value   Blood Pressure  113/55 filed at 01/05/2021 0730   Patient Position - Orthostatic VS  Lying filed at 01/05/2021 0730            Intake/Output Summary (Last 24 hours) at 1/5/2021 8690  Last data filed at 1/4/2021 1810  Gross per 24 hour   Intake    Output 400 ml   Net -400 ml       Telemetry review:  Atrial fibrillation converting to sinus rhythm        Physical Exam:    GEN: Inder Dc appears well, alert and oriented x 3, pleasant and cooperative   HEENT: pupils equal, round, and reactive to light; extraocular muscles intact  NECK: supple, no carotid bruits   HEART: regular rhythm, normal S1 and S2, no murmurs, clicks, gallops or rubs   LUNGS: clear to auscultation bilaterally; no wheezes, rales, or rhonchi   ABDOMEN: normal bowel sounds, soft, no tenderness, no distention  EXTREMITIES: peripheral pulses normal; no clubbing, cyanosis, or edema  NEURO: no focal findings   SKIN: normal without suspicious lesions on exposed skin    Medications:      Current Facility-Administered Medications:     apixaban (ELIQUIS) tablet 5 mg, 5 mg, Oral, BID, Helen Davalos MD, 5 mg at 01/05/21 0820    ascorbic acid (VITAMIN C) tablet 500 mg, 500 mg, Oral, Daily, Helen Davalos MD, 500 mg at 01/05/21 0452    ezetimibe (ZETIA) tablet 10 mg, 10 mg, Oral, Daily, Helen Davalos MD, 10 mg at 01/05/21 0820    famotidine (PEPCID) tablet 20 mg, 20 mg, Oral, Daily, Helen Davalos MD, 20 mg at 01/05/21 0820    gabapentin (NEURONTIN) capsule 600 mg, 600 mg, Oral, HS, Helen Davalos MD, 600 mg at 01/04/21 2103    losartan (COZAAR) tablet 50 mg, 50 mg, Oral, Daily, Anastasiya Shelton Spiromalik, CRNP, 50 mg at 01/05/21 8138    metoprolol (LOPRESSOR) injection 5 mg, 5 mg, Intravenous, Q8H PRN, Helen Davalos MD, 5 mg at 01/03/21 1907    rOPINIRole (REQUIP) tablet 0 25 mg, 0 25 mg, Oral, HS, Helen Davalos MD, 0 25 mg at 01/04/21 2103    sotalol (BETAPACE) tablet 160 mg, 160 mg, Oral, Q12H, Kimi Mcghee MD, 160 mg at 01/05/21 0821    zolpidem (AMBIEN) tablet 10 mg, 10 mg, Oral, HS PRN, Helen Davalos MD, 10 mg at 01/04/21 2130     Labs & Results:    Results from last 7 days   Lab Units 01/03/21  1636 01/03/21  1325 01/03/21  1037   TROPONIN I ng/mL <0 02 <0 02 <0 02     Results from last 7 days   Lab Units 01/04/21  0524 01/03/21  1037   WBC Thousand/uL 5 81 6 05   HEMOGLOBIN g/dL 13 9 13 7   HEMATOCRIT % 42 6 41 8   PLATELETS Thousands/uL 263 264         Results from last 7 days   Lab Units 01/04/21  0524 01/03/21  1037   POTASSIUM mmol/L 4 2 4 4   CHLORIDE mmol/L 104 104   CO2 mmol/L 29 28   BUN mg/dL 29* 29*   CREATININE mg/dL 0 97 1 02   CALCIUM mg/dL 9 4 9 9   ALK PHOS U/L  --  65   ALT U/L  -- 30   AST U/L  --  20         Results from last 7 days   Lab Units 01/04/21  0524   MAGNESIUM mg/dL 2 1         EKG personally reviewed by Michelle Amanda MD   Most recent ECG shows sinus rhythm with prolongation of the QTC at about 520 ms  ECHO:  LEFT VENTRICLE:  Systolic function was normal  Ejection fraction was estimated to be 55 %  There were no regional wall motion abnormalities  Wall thickness was at the upper limits of normal   Doppler parameters were consistent with elevated ventricular end-diastolic filling pressure      LEFT ATRIUM:  The atrium was mildly to moderately dilated      RIGHT ATRIUM:  The atrium was mildly dilated      MITRAL VALVE:  There was mild stenosis      TRICUSPID VALVE:  There was mild to moderate regurgitation  Pulmonary artery systolic pressure was mildly increased  Counseling / Coordination of Care  Total floor / unit time spent today 25 minutes  Greater than 50% of total time was spent with the patient and / or family counseling and / or coordination of care

## 2021-01-05 NOTE — PLAN OF CARE
Problem: PAIN - ADULT  Goal: Verbalizes/displays adequate comfort level or baseline comfort level  Description: Interventions:  - Encourage patient to monitor pain and request assistance  - Assess pain using appropriate pain scale  - Administer analgesics based on type and severity of pain and evaluate response  - Implement non-pharmacological measures as appropriate and evaluate response  - Consider cultural and social influences on pain and pain management  - Notify physician/advanced practitioner if interventions unsuccessful or patient reports new pain  Outcome: Completed     Problem: INFECTION - ADULT  Goal: Absence or prevention of progression during hospitalization  Description: INTERVENTIONS:  - Assess and monitor for signs and symptoms of infection  - Monitor lab/diagnostic results  - Monitor all insertion sites, i e  indwelling lines, tubes, and drains  - Monitor endotracheal if appropriate and nasal secretions for changes in amount and color  - [place] appropriate cooling/warming therapies per order  - Administer medications as ordered  - Instruct and encourage patient and family to use good hand hygiene technique  - Identify and instruct in appropriate isolation precautions for identified infection/condition  Outcome: Completed  Goal: Absence of fever/infection during neutropenic period  Description: INTERVENTIONS:  - Monitor WBC    Outcome: Completed     Problem: SAFETY ADULT  Goal: Patient will remain free of falls  Description: INTERVENTIONS:  - Assess patient frequently for physical needs  -  Identify cognitive and physical deficits and behaviors that affect risk of falls    -  [place] fall precautions as indicated by assessment   - Educate patient/family on patient safety including physical limitations  - Instruct patient to call for assistance with activity based on assessment  - Modify environment to reduce risk of injury  - Consider OT/PT consult to assist with strengthening/mobility  Outcome: Completed  Goal: Maintain or return to baseline ADL function  Description: INTERVENTIONS:  -  Assess patient's ability to carry out ADLs; assess patient's baseline for ADL function and identify physical deficits which impact ability to perform ADLs (bathing, care of mouth/teeth, toileting, grooming, dressing, etc )  - Assess/evaluate cause of self-care deficits   - Assess range of motion  - Assess patient's mobility; develop plan if impaired  - Assess patient's need for assistive devices and provide as appropriate  - Encourage maximum independence but intervene and supervise when necessary  - Involve family in performance of ADLs  - Assess for home care needs following discharge   - Consider OT consult to assist with ADL evaluation and planning for discharge  - Provide patient education as appropriate  Outcome: Completed  Goal: Maintain or return mobility status to optimal level  Description: INTERVENTIONS:  - Assess patient's baseline mobility status (ambulation, transfers, stairs, etc )    - Identify cognitive and physical deficits and behaviors that affect mobility  - Identify mobility aids required to assist with transfers and/or ambulation (gait belt, sit-to-stand, lift, walker, cane, etc )  - [place] fall precautions as indicated by assessment  - Record patient progress and toleration of activity level on Mobility SBAR; progress patient to next Phase/Stage  - Instruct patient to call for assistance with activity based on assessment  - Consider rehabilitation consult to assist with strengthening/weightbearing, etc   Outcome: Completed     Problem: DISCHARGE PLANNING  Goal: Discharge to home or other facility with appropriate resources  Description: INTERVENTIONS:  - Identify barriers to discharge w/patient and caregiver  - Arrange for needed discharge resources and transportation as appropriate  - Identify discharge learning needs (meds, wound care, etc )  - Refer to Case Management Department for coordinating discharge planning if the patient needs post-hospital services based on physician/advanced practitioner order or complex needs related to functional status, cognitive ability, or social support system  Outcome: Completed     Problem: CARDIOVASCULAR - ADULT  Goal: Maintains optimal cardiac output and hemodynamic stability  Description: INTERVENTIONS:  - Monitor I/O, vital signs and rhythm  - Monitor for S/S and trends of decreased cardiac output  - Administer and titrate ordered vasoactive medications to optimize hemodynamic stability  - Assess quality of pulses, skin color and temperature  - Assess for signs of decreased coronary artery perfusion  - Instruct patient to report change in severity of symptoms  Outcome: Completed  Goal: Absence of cardiac dysrhythmias or at baseline rhythm  Description: INTERVENTIONS:  - Continuous cardiac monitoring, vital signs, obtain 12 lead EKG if ordered  - Administer antiarrhythmic and heart rate control medications as ordered  - Monitor electrolytes and administer replacement therapy as ordered  Outcome: Completed

## 2021-01-05 NOTE — DISCHARGE INSTRUCTIONS
A-fib (Atrial Fibrillation)   WHAT YOU NEED TO KNOW:   A-fib may come and go, or it may be a long-term condition  A-fib can cause blood clots, stroke, or heart failure  These conditions may become life-threatening  It is important to treat and manage a-fib to help prevent a blood clot, stroke, or heart failure  DISCHARGE INSTRUCTIONS:   Call your local emergency number (911 in the [facility]) if:   · You have any of the following signs of a heart attack:      ? Squeezing, pressure, or pain in your chest    ? You may  also have any of the following:     ? Discomfort or pain in your back, neck, jaw, stomach, or arm    ? Shortness of breath    ? Nausea or vomiting    ? Lightheadedness or a sudden cold sweat    · You have any of the following signs of a stroke:      ? Numbness or drooping on one side of your face     ? Weakness in an arm or leg    ? Confusion or difficulty speaking    ? Dizziness, a severe headache, or vision loss    Call your cardiologist if:   · Your arm or leg feels warm, tender, and painful  It may look swollen and red  · Your heart rate is more than 110 beats per minute  · You have new or worsening swelling in your legs, feet, ankles, or abdomen  · You are short of breath, even at rest      · You have questions or concerns about your condition or care  Medicines: You may need any of the following:  · Heart medicines  help control your heart rate or rhythm  You may need more than one medicine to treat your symptoms  · Blood thinners  help prevent blood clots  Clots can cause strokes, heart attacks, and death  The following are general safety guidelines to follow while you are taking a blood thinner:    ? Watch for bleeding and bruising while you take blood thinners  Watch for bleeding from your gums or nose  Watch for blood in your urine and bowel movements  Use a soft washcloth on your skin, and a soft toothbrush to brush your teeth  This can keep your skin and gums from bleeding  If you shave, use an electric shaver  Do not play contact sports  ? Tell your dentist and other healthcare providers that you take a blood thinner  Wear a bracelet or necklace that says you take this medicine  ? Do not start or stop any other medicines unless your healthcare provider tells you to  Many medicines cannot be used with blood thinners  ? Take your blood thinner exactly as prescribed by your healthcare provider  Do not skip does or take less than prescribed  Tell your provider right away if you forget to take your blood thinner, or if you take too much  ? Warfarin  is a blood thinner that you may need to take  The following are things you should be aware of if you take warfarin:     § Foods and medicines can affect the amount of warfarin in your blood  Do not make major changes to your diet while you take warfarin  Warfarin works best when you eat about the same amount of vitamin K every day  Vitamin K is found in green leafy vegetables and certain other foods  Ask for more information about what to eat when you are taking warfarin  § You will need to see your healthcare provider for follow-up visits when you are on warfarin  You will need regular blood tests  These tests are used to decide how much medicine you need  · Antiplatelets , such as aspirin, help prevent blood clots  Take your antiplatelet medicine exactly as directed  These medicines make it more likely for you to bleed or bruise  If you are told to take aspirin, do not take acetaminophen or ibuprofen instead  · Take your medicine as directed  Contact your healthcare provider if you think your medicine is not helping or if you have side effects  Tell him or her if you are allergic to any medicine  Keep a list of the medicines, vitamins, and herbs you take  Include the amounts, and when and why you take them  Bring the list or the pill bottles to follow-up visits   Carry your medicine list with you in case of an emergency  Manage A-fib:   · Know your target heart rate  Learn how to check your pulse and monitor your heart rate  · Know the risks if you choose to drink alcohol  Alcohol can make a-fib hard to manage  Ask your healthcare provider if it is safe for you to drink alcohol  A drink of alcohol is 12 ounces of beer, 5 ounces of wine, or 1½ ounces of liquor  · Do not smoke  Nicotine can cause heart damage and make it more difficult to manage your a-fib  Do not use e-cigarettes or smokeless tobacco in place of cigarettes or to help you quit  They still contain nicotine  Ask your healthcare provider for information if you currently smoke and need help quitting  · Eat heart-healthy foods  Heart healthy foods will help keep your cholesterol low  These include fruits, vegetables, whole-grain breads, low-fat dairy products, beans, lean meats, and fish  Replace butter and margarine with heart-healthy oils such as olive oil and canola oil  · Maintain a healthy weight  Ask your healthcare provider how much you should weigh  Ask him or her to help you create a safe weight loss plan if you are overweight  Even a small goal of a 10% weight loss can improve your heart health  · Get regular physical activity  Physical activity helps improve your heart health  Get at least 150 minutes of moderate aerobic physical activity each week  Your healthcare provider can help you create an activity plan  · Manage other health conditions  This includes high blood pressure or cholesterol, sleep apnea, diabetes, and other heart conditions  Take medicine as directed and follow your treatment plan  Follow up with your cardiologist as directed: You will need regular blood tests and monitoring  Write down your questions so you remember to ask them during your visits     © Copyright [address] Information is for End User's use only and may not be sold, redistributed or otherwise used for commercial purposes  All illustrations and images included in CareNotes® are the copyrighted property of [employer]  or Jeff [employer]   The above information is an  only  It is not intended as medical advice for individual conditions or treatments  Talk to your doctor, nurse or pharmacist before following any medical regimen to see if it is safe and effective for you  How to Take a Pulse   WHAT YOU NEED TO KNOW:   What is a pulse? Your pulse is your heartbeat  The range for an adult pulse is 60 to 100 beats a minute  The 2 most common areas to feel your pulse are your wrist and neck  You may need to check and record your pulse rate because of an illness or certain medicines  How do I check the pulse on my wrist?   · Place your index and middle fingers on the inside of your wrist, below your thumb  · Use a watch with a second hand and count your pulse for 60 seconds  · Write down your pulse rate, the date, time, and which side was used to take the pulse  Also write down anything you notice about your pulse, such as that it is weak, strong, or missing beats  How do I check the pulse on my neck? · Place your index and middle fingers on one side of your neck, just under your jaw, where your neck and jaw meet  · Use a watch with a second hand and count your pulse for 60 seconds  · Write down your pulse rate, the date, time, and which side was used to take the pulse  Also write down anything you notice about your pulse, such as that it is weak, strong, or missing beats  ·        What else do I need to know? Your healthcare provider will tell you how often to check your pulse  You may need to check your pulse regularly, such as before and after you take medicine  Your healthcare provider will tell you when to contact him if it is out of your usual range  Keep a record of your pulse rate and take it with you to your follow-up visits  When should I contact my healthcare provider? · Your pulse rate is higher or lower than the range your healthcare provider gave you  · Your pulse is irregular  · You have questions or concerns about your condition or care  When should I seek immediate care or call 911? · You feel dizzy or lightheaded  · You feel like you are going to faint  CARE AGREEMENT:   You have the right to help plan your care  Learn about your health condition and how it may be treated  Discuss treatment options with your healthcare providers to decide what care you want to receive  You always have the right to refuse treatment  The above information is an  only  It is not intended as medical advice for individual conditions or treatments  Talk to your doctor, nurse or pharmacist before following any medical regimen to see if it is safe and effective for you  © Copyright [address] Information is for End User's use only and may not be sold, redistributed or otherwise used for commercial purposes   All illustrations and images included in CareNotes® are the copyrighted property of [employer]  or [address] [place] [address]

## 2021-01-05 NOTE — DISCHARGE SUMMARY
Discharge- Vladislav Emelle 1945, 67 y o  female MRN: [id number]    Unit/Bed#: S -01 Encounter: [id number]    Primary Care Provider: Arely Watkins MD   Date and time admitted to hospital: 1/3/2021 10:14 AM        * Atrial fibrillation with rapid ventricular response (HCC)  Assessment & Plan  POA, palpitation and exertional SOB started 3 days prior to admission  ECG on admission showing a fib with RVR responding to 1 dose of IV lopressor 5 mg  HR rate is getting uncontrolled with minimal exertion  Plan:  · Increase metoprolol to 50 mg bid  · TSH normal  · ECHO: Systolic function was normal  Ejection fraction was estimated to be 55 %  TRICUSPID VALVE:There was mild to moderate regurgitation  · Cardiology recommendations  · Converted yesterday to normal sinus  Her most recent ECG has a prolonged QTC out to about 520-525 ms  Therefore we will use the lower dose of sotalol at 120 mg twice daily  Continue metoprolol 25 mg twice daily which was started in the outpatient setting  Continue Eliquis for stroke prevention  We will arrange follow-up with both her primary cardiologist and electrophysiology  Chronic diastolic CHF (congestive heart failure) (HCC)  Assessment & Plan  Wt Readings from Last 3 Encounters:   01/04/21 67 4 kg (148 lb 9 6 oz)   12/21/20 68 9 kg (152 lb)   12/10/20 68 9 kg (152 lb)       Appears compensated at this time  Elevated proBNP likely secondary to AFib RVR  Chest x-ray unremarkable  Last echo in 2018 with preserved EF and grade 2 diastolic dysfunction  Patient is not any diuretics at home    Plan:  · ECHO: Systolic function was normal  Ejection fraction was estimated to be 55 %  TRICUSPID VALVE:There was mild to moderate regurgitation  · Monitor daily weights and I&Os      Hyperlipidemia  Assessment & Plan  Continue Zetia    Essential hypertension  Assessment & Plan  Blood pressure is acceptable      · Continue sotalol, losartan  · Continue metoprolol 25 mg twice daily after discharge  · Monitor blood pressure      Discharging Resident Physician: Verner Gowda, DO  Attending: Petra Brody DO  PCP: Gilberto Talamantes MD  Admission Date: 1/3/2021  Discharge Date: 01/05/21    Disposition:     Home    Reason for Admission:  Atrial fibrillation with RVR    Consultations During Hospital Stay:  · Cardiology    Procedures Performed:     · None    Significant Findings / Test Results:     Xr Chest 1 View Portable  Result Date: 1/3/2021  · Impression: No acute cardiopulmonary disease  Workstation performed: [id number]     · After pt converted to sinus rhythm EKG showed QTC prolongation (520 -525ms)     Incidental Findings:   · All findings listed above     Test Results Pending at Discharge (will require follow up): · None     Outpatient Tests Requested:  · Pt will need to follow-up with cardiologist and electrophysiologist    Complications:  None    Hospital Course:     Per H&P Note: Ricci Pineda a 67 y  o  female with past medical history significant for paroxysmal atrial fibrillation, chronic diastolic heart failure, hypertension and hyperlipidemia who presents with palpitation exertion shortness of breath since Thursday   Patient was recently seen by Cardiology in clinic and was started on metoprolol 25 mg b i d  With improvement of her symptoms however on Thursday she started to feel palpitation exertional dyspnea again  Filomena Luz denied any chest pain, fever, chills, cough, dizziness   She denies orthopnea or nocturnal paroxysmal dyspnea or weight gain or change in her diet   Denies any caffeine intake, denies alcohol and denies any symptoms of AKIN other than occasional snoring  Binu Penny was a discussion more when patient was seen by Cardiology in clinic about cardioversion F heart rate continued to be uncontrolled which was also plan to do a SHANNAN prior to it as patient has been off of Eliquis due to a procedure and was resumed on 12/16/2020      On admission she had Afib with RVR and was given Lopressor IV 5mg, HR was between 120s  Cardiology was consulted and there plan was for SHANNAN with cardioversion  The day after admission pt continued to stay in Afib with HR between 70 - 120s  She was made NPO, sotalol was increased to 160mg bid, and metoprolol XL was switched to tartrate  On 1/4 around 4:30 p m  pt converted to normal sinus rhythm and EKG showed prolonged QTC above 500  Cardiology also decreased her dose of losartan to 50 mg from 100 mg  On 01/05 cardiology saw her and made the decision to decrease her sotalol to 120 mg twice a day, she will need to follow-up with Cardiology and electrophysiology after discharge  Condition at Discharge: good     Discharge Day Visit / Exam:     Subjective:  Pt was seen examined at bedside, no acute events overnight  Pt states she is feeling better, she is able to ambulate without shortness of breath, she denies palpitations and feels ready for discharge  Vitals: Blood Pressure: 113/55 (01/05/21 0730)  Pulse: (!) 52 (01/05/21 0730)  Temperature: 97 8 °F (36 6 °C) (01/05/21 0730)  Temp Source: Oral (01/05/21 0730)  Respirations: 18 (01/04/21 2215)  Weight - Scale: 68 1 kg (150 lb 3 2 oz) (01/05/21 0600)  SpO2: 98 % (01/05/21 0730)  Exam:   Physical Exam  Vitals signs and nursing note reviewed  Constitutional:       General: She is not in acute distress  Appearance: Normal appearance  She is obese  She is not ill-appearing, toxic-appearing or diaphoretic  HENT:      Head: Normocephalic and atraumatic  Right Ear: External ear normal       Left Ear: External ear normal       Nose: Nose normal    Eyes:      General: No scleral icterus  Right eye: No discharge  Left eye: No discharge  Conjunctiva/sclera: Conjunctivae normal    Neck:      Musculoskeletal: Normal range of motion  Cardiovascular:      Rate and Rhythm: Normal rate and regular rhythm  Pulses: Normal pulses             Radial pulses are 2+ on the right side and 2+ on the left side  Heart sounds: Normal heart sounds  No murmur  No friction rub  Pulmonary:      Effort: Pulmonary effort is normal  No respiratory distress  Breath sounds: Normal breath sounds  No stridor  No wheezing, rhonchi or rales  Chest:      Chest wall: No tenderness  Abdominal:      General: Abdomen is flat  Bowel sounds are normal  There is no distension  Palpations: Abdomen is soft  There is no mass  Tenderness: There is no abdominal tenderness  There is no guarding or rebound  Hernia: No hernia is present  Musculoskeletal: Normal range of motion  General: No swelling, tenderness, deformity or signs of injury  Right lower leg: No edema  Left lower leg: No edema  Skin:     General: Skin is warm  Coloration: Skin is not jaundiced or pale  Findings: No bruising, erythema, lesion or rash  Neurological:      General: No focal deficit present  Mental Status: She is alert and oriented to person, place, and time  Psychiatric:         Mood and Affect: Mood normal        Discussion with Family:  Left a phone message with pt's daughter about plan  Discharge instructions/Information to patient and family:   See after visit summary for information provided to patient and family  Provisions for Follow-Up Care:  See after visit summary for information related to follow-up care and any pertinent home health orders  Planned Readmission:  None     Discharge Medications:  See after visit summary for reconciled discharge medications provided to patient and family        ** Please Note: This note has been constructed using a voice recognition system **

## 2021-01-06 ENCOUNTER — TRANSITIONAL CARE MANAGEMENT (OUTPATIENT)
Dept: FAMILY MEDICINE CLINIC | Facility: CLINIC | Age: 76
End: 2021-01-06

## 2021-01-06 ENCOUNTER — TELEPHONE (OUTPATIENT)
Dept: FAMILY MEDICINE CLINIC | Facility: CLINIC | Age: 76
End: 2021-01-06

## 2021-01-06 NOTE — TELEPHONE ENCOUNTER
Patient scheduled for TCM with Dr Davon De Leon on 1/14/20 10:30  Admitted Joel Montes 1/3/20-1/5/20 YAHIR Ramos

## 2021-01-08 ENCOUNTER — APPOINTMENT (EMERGENCY)
Dept: RADIOLOGY | Facility: HOSPITAL | Age: 76
End: 2021-01-08
Payer: MEDICARE

## 2021-01-08 ENCOUNTER — TELEPHONE (OUTPATIENT)
Dept: CARDIOLOGY CLINIC | Facility: CLINIC | Age: 76
End: 2021-01-08

## 2021-01-08 ENCOUNTER — HOSPITAL ENCOUNTER (OUTPATIENT)
Facility: HOSPITAL | Age: 76
Setting detail: OBSERVATION
Discharge: HOME | End: 2021-01-09
Attending: EMERGENCY MEDICINE | Admitting: INTERNAL MEDICINE
Payer: MEDICARE

## 2021-01-08 DIAGNOSIS — R07.9 CHEST PAIN: ICD-10-CM

## 2021-01-08 DIAGNOSIS — I48.0 PAF (PAROXYSMAL ATRIAL FIBRILLATION) (HCC): Primary | ICD-10-CM

## 2021-01-08 DIAGNOSIS — R06.00 DYSPNEA ON EXERTION: ICD-10-CM

## 2021-01-08 PROBLEM — R07.89 CHEST PRESSURE: Status: ACTIVE | Noted: 2021-01-08

## 2021-01-08 PROBLEM — Z63.79 STRESS DUE TO ILLNESS OF FAMILY MEMBER: Status: ACTIVE | Noted: 2021-01-08

## 2021-01-08 LAB
ALBUMIN SERPL BCP-MCNC: 3.8 G/DL (ref 3.5–5)
ALP SERPL-CCNC: 63 U/L (ref 46–116)
ALT SERPL W P-5'-P-CCNC: 27 U/L (ref 12–78)
ANION GAP SERPL CALCULATED.3IONS-SCNC: 8 MMOL/L (ref 4–13)
APTT PPP: 32 SECONDS (ref 23–37)
AST SERPL W P-5'-P-CCNC: 20 U/L (ref 5–45)
ATRIAL RATE: 49 BPM
BASOPHILS # BLD AUTO: 0.04 THOUSANDS/ΜL (ref 0–0.1)
BASOPHILS NFR BLD AUTO: 1 % (ref 0–1)
BILIRUB SERPL-MCNC: 0.97 MG/DL (ref 0.2–1)
BUN SERPL-MCNC: 22 MG/DL (ref 5–25)
CALCIUM SERPL-MCNC: 9.3 MG/DL (ref 8.3–10.1)
CHLORIDE SERPL-SCNC: 106 MMOL/L (ref 100–108)
CO2 SERPL-SCNC: 28 MMOL/L (ref 21–32)
CREAT SERPL-MCNC: 0.92 MG/DL (ref 0.6–1.3)
EOSINOPHIL # BLD AUTO: 0.16 THOUSAND/ΜL (ref 0–0.61)
EOSINOPHIL NFR BLD AUTO: 3 % (ref 0–6)
ERYTHROCYTE [DISTWIDTH] IN BLOOD BY AUTOMATED COUNT: 12.8 % (ref 11.6–15.1)
GFR SERPL CREATININE-BSD FRML MDRD: 61 ML/MIN/1.73SQ M
GLUCOSE SERPL-MCNC: 92 MG/DL (ref 65–140)
HCT VFR BLD AUTO: 38.4 % (ref 34.8–46.1)
HGB BLD-MCNC: 12.5 G/DL (ref 11.5–15.4)
IMM GRANULOCYTES # BLD AUTO: 0.01 THOUSAND/UL (ref 0–0.2)
IMM GRANULOCYTES NFR BLD AUTO: 0 % (ref 0–2)
INR PPP: 1.17 (ref 0.84–1.19)
LYMPHOCYTES # BLD AUTO: 1.74 THOUSANDS/ΜL (ref 0.6–4.47)
LYMPHOCYTES NFR BLD AUTO: 32 % (ref 14–44)
MCH RBC QN AUTO: 32.4 PG (ref 26.8–34.3)
MCHC RBC AUTO-ENTMCNC: 32.6 G/DL (ref 31.4–37.4)
MCV RBC AUTO: 100 FL (ref 82–98)
MONOCYTES # BLD AUTO: 0.59 THOUSAND/ΜL (ref 0.17–1.22)
MONOCYTES NFR BLD AUTO: 11 % (ref 4–12)
NEUTROPHILS # BLD AUTO: 2.98 THOUSANDS/ΜL (ref 1.85–7.62)
NEUTS SEG NFR BLD AUTO: 53 % (ref 43–75)
NRBC BLD AUTO-RTO: 0 /100 WBCS
NT-PROBNP SERPL-MCNC: 761 PG/ML
P AXIS: 76 DEGREES
PLATELET # BLD AUTO: 231 THOUSANDS/UL (ref 149–390)
PMV BLD AUTO: 10.2 FL (ref 8.9–12.7)
POTASSIUM SERPL-SCNC: 4 MMOL/L (ref 3.5–5.3)
PR INTERVAL: 166 MS
PROT SERPL-MCNC: 7.1 G/DL (ref 6.4–8.2)
PROTHROMBIN TIME: 15.1 SECONDS (ref 11.6–14.5)
QRS AXIS: 28 DEGREES
QRSD INTERVAL: 88 MS
QT INTERVAL: 520 MS
QTC INTERVAL: 460 MS
RBC # BLD AUTO: 3.86 MILLION/UL (ref 3.81–5.12)
SODIUM SERPL-SCNC: 142 MMOL/L (ref 136–145)
T WAVE AXIS: 12 DEGREES
TROPONIN I SERPL-MCNC: <0.02 NG/ML
VENTRICULAR RATE: 47 BPM
WBC # BLD AUTO: 5.52 THOUSAND/UL (ref 4.31–10.16)

## 2021-01-08 PROCEDURE — 85730 THROMBOPLASTIN TIME PARTIAL: CPT | Performed by: PHYSICIAN ASSISTANT

## 2021-01-08 PROCEDURE — 85610 PROTHROMBIN TIME: CPT | Performed by: PHYSICIAN ASSISTANT

## 2021-01-08 PROCEDURE — 99285 EMERGENCY DEPT VISIT HI MDM: CPT

## 2021-01-08 PROCEDURE — 93010 ELECTROCARDIOGRAM REPORT: CPT | Performed by: INTERNAL MEDICINE

## 2021-01-08 PROCEDURE — 99285 EMERGENCY DEPT VISIT HI MDM: CPT | Performed by: PHYSICIAN ASSISTANT

## 2021-01-08 PROCEDURE — 83880 ASSAY OF NATRIURETIC PEPTIDE: CPT | Performed by: PHYSICIAN ASSISTANT

## 2021-01-08 PROCEDURE — 80053 COMPREHEN METABOLIC PANEL: CPT | Performed by: PHYSICIAN ASSISTANT

## 2021-01-08 PROCEDURE — 71045 X-RAY EXAM CHEST 1 VIEW: CPT

## 2021-01-08 PROCEDURE — 99236 HOSP IP/OBS SAME DATE HI 85: CPT | Performed by: INTERNAL MEDICINE

## 2021-01-08 PROCEDURE — 36415 COLL VENOUS BLD VENIPUNCTURE: CPT | Performed by: PHYSICIAN ASSISTANT

## 2021-01-08 PROCEDURE — 84484 ASSAY OF TROPONIN QUANT: CPT | Performed by: PHYSICIAN ASSISTANT

## 2021-01-08 PROCEDURE — 93005 ELECTROCARDIOGRAM TRACING: CPT

## 2021-01-08 PROCEDURE — 85025 COMPLETE CBC W/AUTO DIFF WBC: CPT | Performed by: PHYSICIAN ASSISTANT

## 2021-01-08 RX ORDER — ACETAMINOPHEN 325 MG/1
650 TABLET ORAL EVERY 6 HOURS PRN
Status: DISCONTINUED | OUTPATIENT
Start: 2021-01-08 | End: 2021-01-09 | Stop reason: HOSPADM

## 2021-01-08 RX ORDER — ZOLPIDEM TARTRATE 5 MG/1
5 TABLET ORAL
Status: DISCONTINUED | OUTPATIENT
Start: 2021-01-08 | End: 2021-01-09 | Stop reason: HOSPADM

## 2021-01-08 RX ORDER — FAMOTIDINE 20 MG/1
20 TABLET, FILM COATED ORAL DAILY
Status: DISCONTINUED | OUTPATIENT
Start: 2021-01-08 | End: 2021-01-09 | Stop reason: HOSPADM

## 2021-01-08 RX ORDER — ROPINIROLE 0.25 MG/1
0.25 TABLET, FILM COATED ORAL
Status: DISCONTINUED | OUTPATIENT
Start: 2021-01-08 | End: 2021-01-09 | Stop reason: HOSPADM

## 2021-01-08 RX ORDER — SENNOSIDES 8.6 MG
650 CAPSULE ORAL EVERY 6 HOURS PRN
Status: DISCONTINUED | OUTPATIENT
Start: 2021-01-08 | End: 2021-01-08

## 2021-01-08 RX ORDER — ASCORBIC ACID 500 MG
500 TABLET ORAL DAILY
Status: DISCONTINUED | OUTPATIENT
Start: 2021-01-08 | End: 2021-01-09 | Stop reason: HOSPADM

## 2021-01-08 RX ORDER — TRAMADOL HYDROCHLORIDE 50 MG/1
50 TABLET ORAL 2 TIMES DAILY PRN
Status: DISCONTINUED | OUTPATIENT
Start: 2021-01-08 | End: 2021-01-09 | Stop reason: HOSPADM

## 2021-01-08 RX ORDER — GABAPENTIN 300 MG/1
600 CAPSULE ORAL
Status: DISCONTINUED | OUTPATIENT
Start: 2021-01-08 | End: 2021-01-09 | Stop reason: HOSPADM

## 2021-01-08 RX ORDER — LOSARTAN POTASSIUM 50 MG/1
50 TABLET ORAL DAILY
Status: DISCONTINUED | OUTPATIENT
Start: 2021-01-08 | End: 2021-01-09 | Stop reason: HOSPADM

## 2021-01-08 RX ORDER — EZETIMIBE 10 MG/1
10 TABLET ORAL DAILY
Status: DISCONTINUED | OUTPATIENT
Start: 2021-01-08 | End: 2021-01-09 | Stop reason: HOSPADM

## 2021-01-08 RX ORDER — SOTALOL HYDROCHLORIDE 80 MG/1
120 TABLET ORAL EVERY 12 HOURS
Status: DISCONTINUED | OUTPATIENT
Start: 2021-01-08 | End: 2021-01-09 | Stop reason: HOSPADM

## 2021-01-08 RX ORDER — MELATONIN
2000 DAILY
Status: DISCONTINUED | OUTPATIENT
Start: 2021-01-08 | End: 2021-01-09 | Stop reason: HOSPADM

## 2021-01-08 RX ORDER — ASPIRIN 81 MG/1
324 TABLET, CHEWABLE ORAL ONCE
Status: COMPLETED | OUTPATIENT
Start: 2021-01-08 | End: 2021-01-08

## 2021-01-08 RX ADMIN — GABAPENTIN 600 MG: 300 CAPSULE ORAL at 22:06

## 2021-01-08 RX ADMIN — METOPROLOL TARTRATE 25 MG: 25 TABLET, FILM COATED ORAL at 22:05

## 2021-01-08 RX ADMIN — Medication 2000 UNITS: at 17:39

## 2021-01-08 RX ADMIN — APIXABAN 5 MG: 5 TABLET, FILM COATED ORAL at 17:39

## 2021-01-08 RX ADMIN — ASPIRIN 81 MG CHEWABLE TABLET 324 MG: 81 TABLET CHEWABLE at 10:55

## 2021-01-08 RX ADMIN — EZETIMIBE 10 MG: 10 TABLET ORAL at 17:39

## 2021-01-08 RX ADMIN — LOSARTAN POTASSIUM 50 MG: 50 TABLET, FILM COATED ORAL at 17:39

## 2021-01-08 RX ADMIN — ROPINIROLE HYDROCHLORIDE 0.25 MG: 0.25 TABLET, FILM COATED ORAL at 22:05

## 2021-01-08 RX ADMIN — ZOLPIDEM TARTRATE 5 MG: 5 TABLET ORAL at 22:06

## 2021-01-08 NOTE — ASSESSMENT & PLAN NOTE
· Patient presented after experiencing a 15 minutes episode of chest pressure/tightness this morning and slight sense of heart racing which resolved without medication, with taking a few deep calming breaths  · Trop negative x 3   EKG on admission nonischemic  · Patient has no history of heart disease and KYLIE score is 1 based on age; she is also known to have hypertension and dyslipidemia  · Nuclear Myoview stress test 2016 noted to be normal  · Suspect that her symptoms are more likely due to significant stress at home due to caring for her  with Parkinson's disease

## 2021-01-08 NOTE — ASSESSMENT & PLAN NOTE
· Blood pressure elevated on admission at 190/88 but noted to be improved without intervention down to 140/63  · Continue Cozaar and Lopressor

## 2021-01-08 NOTE — ASSESSMENT & PLAN NOTE
· Patient presented after experiencing a 15 minutes episode of chest pressure/tightness this morning and slight sense of heart racing which resolved without medication, with taking a few deep calming breaths  · Initial troponin negative, will check 2 more troponins; EKG on admission nonischemic  · Patient has no history of heart disease and KYLIE score is 1 based on age; she is also known to have hypertension and dyslipidemia  · Nuclear Myoview stress test 2016 noted to be normal  · Suspect that her symptoms are more likely due to significant stress at home due to caring for her  with Parkinson's disease

## 2021-01-08 NOTE — ASSESSMENT & PLAN NOTE
· Spoke to patient in some detail regarding significant stress she is experiencing at home in caring for her     Encouraged her to seek resources including counseling to help cope and allow friends and neighbors to assist with chores and groceries to shift some of her other burden

## 2021-01-08 NOTE — DISCHARGE SUMMARY
Discharge- Louis Confer 1945, 67 y o  female MRN: [id number]    Unit/Bed#: ED 08 Encounter: [id number]    Primary Care Provider: Sharri Chavez MD   Date and time admitted to hospital: 1/8/2021 10:33 AM    * Chest pressure  Assessment & Plan  · Patient presented after experiencing a 15 minutes episode of chest pressure/tightness this morning and slight sense of heart racing which resolved without medication, with taking a few deep calming breaths  · Trop negative x 3  EKG on admission nonischemic  · Patient has no history of heart disease and KYLIE score is 1 based on age; she is also known to have hypertension and dyslipidemia  · Nuclear Myoview stress test 2016 noted to be normal  · Suspect that her symptoms are more likely due to significant stress at home due to caring for her  with Parkinson's disease    PAF (paroxysmal atrial fibrillation) ([facility] [address] )  Assessment & Plan  · Patient with known history of paroxysmal AFib recently admitted to this hospital and discharged on January 5th  During that admission she was seen by Cardiology and placed on regimen including sotalol 120 mg twice a day, metoprolol 25 mg twice a day, and Eliquis 5 mg twice a day  · Patient is currently in sinus bradycardia with normal QTC  · Case was reviewed with Cardiology  An appointment has already been made with EP specialist but they will attempt expedited and will also plan to arrange Zio patch for home heart rhythm monitoring    Stress due to illness of family member  [address] [place] [address]  · Spoke to patient in some detail regarding significant stress she is experiencing at home in caring for her     Encouraged her to seek resources including counseling to help cope and allow friends and neighbors to assist with chores and groceries to shift some of her other burden      Essential hypertension  Assessment & Plan  · Blood pressure elevated on admission at 190/88 but noted to be improved without intervention down to 140/63  · Continue Cozaar and Lopressor      Discharging Physician / Practitioner: Callie Kirk PA-C  PCP: Melissa Balderrama MD  Admission Date:   Admission Orders (From admission, onward)     Ordered        01/08/21 1202  Place in Observation  Once                   Discharge Date: 01/08/21    Resolved Problems  Date Reviewed: 1/8/2021    None          Consultations During Hospital Stay:  · None    Procedures Performed:   · Chest x-ray no active disease    Significant Findings / Test Results:   · None    Incidental Findings:   · None     Test Results Pending at Discharge (will require follow up): · None     Outpatient Tests Requested:  · Cardiology will send patient a Zio patch    Complications:  None    Reason for Admission:  Chest tightness    Hospital Course:     Collins Chowdary is a 67 y o  female patient who originally presented to the hospital on 1/8/2021 due to brief episode of chest tightness/pressure at home associated with sense of heart racing  Patient had just been admitted to the hospital from January 3rd through January 5th for rapid AFib  However on this admission when she came back she was noted to be still in sinus bradycardia  She was stable on her metoprolol, sotalol and Eliquis  She had serial troponin monitoring which was negative  She already has outpatient appointment scheduled with electrophysiology and will be set up with a Zio patch  In discussion with the patient it appears that she is under significant stress at home in caring for her  which was likely causing her chest pain  Please see above list of diagnoses and related plan for additional information       Condition at Discharge: stable     Discharge Day Visit / Exam:     Physical Exam   Constitutional: No acute distress, non-toxic in appearance  CV: RRR, no M/R/G  Pulm: CTAB    Discussion with Family: patient has updated daughter    Discharge instructions/Information to patient and family:   See after visit summary for information provided to patient and family  Provisions for Follow-Up Care:  See after visit summary for information related to follow-up care and any pertinent home health orders  Disposition:     Home    Planned Readmission: none     Discharge Statement:  I spent 30 minutes discharging the patient  This time was spent on the day of discharge  I had direct contact with the patient on the day of discharge  Greater than 50% of the total time was spent examining patient, answering all patient questions, arranging and discussing plan of care with patient as well as directly providing post-discharge instructions  Additional time then spent on discharge activities  Discharge Medications:  See after visit summary for reconciled discharge medications provided to patient and family        ** Please Note: This note has been constructed using a voice recognition system **

## 2021-01-08 NOTE — ASSESSMENT & PLAN NOTE
· Blood pressure elevated on admission at 190/88 but noted to be improved without intervention now down to 140/63  · Continue Cozaar and Lopressor

## 2021-01-08 NOTE — H&P
H&P- Cindy Scott 1945, 67 y o  female MRN: [id number]    Unit/Bed#: ED 08 Encounter: [id number]    Primary Care Provider: Gilberto Talamantes MD   Date and time admitted to hospital: 1/8/2021 10:33 AM  * Chest pressure  Assessment & Plan  · Patient presented after experiencing a 15 minutes episode of chest pressure/tightness this morning and slight sense of heart racing which resolved without medication, with taking a few deep calming breaths  · Initial troponin negative, will check 2 more troponins; EKG on admission nonischemic  · Patient has no history of heart disease and KYLIE score is 1 based on age; she is also known to have hypertension and dyslipidemia  · Nuclear Myoview stress test 2016 noted to be normal  · Suspect that her symptoms are more likely due to significant stress at home due to caring for her  with Parkinson's disease    PAF (paroxysmal atrial fibrillation) ([facility] [address] )  Assessment & Plan  · Patient with known history of paroxysmal AFib recently admitted to this hospital and discharged on January 5th  During that admission she was seen by Cardiology and placed on regimen including sotalol 120 mg twice a day, metoprolol 25 mg twice a day, and Eliquis 5 mg twice a day  · Patient is currently in sinus bradycardia with normal QTC  · Case was reviewed with Cardiology  An appointment has already been made with EP specialist but they will attempt expedited and will also plan to arrange Zio patch for home heart rhythm monitoring    Stress due to illness of family member  [address] [place] [address]  · Spoke to patient in some detail regarding significant stress she is experiencing at home in caring for her     Encouraged her to seek resources including counseling to help cope and allow friends and neighbors to assist with chores and groceries to shift some of her other burden      Essential hypertension  Assessment & Plan  · Blood pressure elevated on admission at 190/88 but noted to be improved without intervention now down to 140/63  · Continue Cozaar and Lopressor    VTE Prophylaxis: Enoxaparin (Lovenox)    Code Status: full code  POLST: POLST is not applicable to this patient  Discussion with family:     Anticipated Length of Stay:  Patient will be admitted on an Observation basis with an anticipated length of stay of  Less than 2 midnights  Justification for Hospital Stay: atypical, likely noncardiac CP, low KYLIE    Total Time for Visit, including Counseling / Coordination of Care: 1 hour  Greater than 50% of this total time spent on direct patient counseling and coordination of care  Chief Complaint:   Chest pressure    History of Present Illness:    Garry Wiseman is a 67 y o  female who presents with sense of chest tightness/pressure which occurred this morning after breakfast sometime between 8:30 and 9:30 a m  Patient denies any radiation of pain to her back, arm, jaw, or abdomen and denies any associated symptoms at the time of nausea, dyspepsia, acid reflux, or acute shortness of breath  She was not doing anything particular at the time of exertion  She did briefly feel a sense of heart racing  Her 's visiting nurse encouraged her to sit down and take a few deep breaths which did resolve her symptoms  She did speak to me in some detail about the significant stress she is experiencing at home at this time and states that her daughter believes this was the culprit for her chest discomfort this morning  Patient denies any known history of heart disease  She denies any tenderness to palpation of her chest and has not done any heavy lifting  She denies any increasing exertional symptomatology  Review of Systems:    Review of Systems   Constitutional: Negative for activity change, appetite change, chills, diaphoresis, fatigue, fever and unexpected weight change          Denies fever chills or any recent COVID exposure   HENT: Positive for rhinorrhea (Mild runny nose since December)  Negative for postnasal drip and trouble swallowing  Denies loss of sense of taste or smell   Respiratory: Positive for cough (Mild cough since December not worsening), chest tightness and shortness of breath (Unchanged complaints of shortness of breath when walking up the stairs)  Negative for wheezing  Cardiovascular: Positive for chest pain and palpitations  Negative for leg swelling  Gastrointestinal: Negative for abdominal pain, nausea and vomiting  Musculoskeletal: Negative for gait problem  Skin: Negative for color change, pallor, rash and wound  Neurological: Negative for dizziness, light-headedness and headaches     Psychiatric/Behavioral:        Stress       Past Medical and Surgical History:     Past Medical History:   Diagnosis Date    Actinic keratosis     last assessed - 06Jun2014    Arthritis     Atrial fibrillation with rapid ventricular response ([facility] [address] )     last assessed - 26Apr2016    Basal cell carcinoma     Benign colon polyp     last assessed - 27Apr2015    Disease of thyroid gland     Effusion of knee joint right     Resolved - [id number]    Esophageal reflux     Fibromyalgia     last assessed - [id number]    Fibromyalgia, primary     GERD (gastroesophageal reflux disease)     Hypertension     Palpitations     last assessed - [id number]    Peroneal tendonitis, right     last assessed - [id number]    Right lumbar radiculopathy 3/17/2016    Thyroid nodule     Trochanteric bursitis of left hip 3/9/2018       Past Surgical History:   Procedure Laterality Date    CATARACT EXTRACTION Bilateral     COLONOSCOPY  03/2018    EYE SURGERY      HYSTERECTOMY      JOINT REPLACEMENT Left     knee    NM REVISE MEDIAN N/CARPAL TUNNEL SURG Right 11/14/2019    Procedure: RELEASE CARPAL TUNNEL;  Surgeon: Andrew Alanis MD;  [facility];  Service: Orthopedics    NM THYROID LOBECTOMY,UNILAT Left 12/16/2020    Procedure: Left THYROID LOBECTOMY;  Surgeon: Vicenta Lee Tacos Ward MD;  [facility];  Service: ENT    RECTAL POLYPECTOMY      REPLACEMENT TOTAL KNEE Left     last assessed - 27Apr2015    TONSILLECTOMY      TOTAL ABDOMINAL HYSTERECTOMY      TUBAL LIGATION      US GUIDED THYROID BIOPSY  10/14/2020       Meds/Allergies:    Prior to Admission medications    Medication Sig Start Date End Date Taking?  Authorizing Provider   acetaminophen (TYLENOL) 650 mg CR tablet Take 1 tablet (650 mg total) by mouth every 6 (six) hours as needed for mild pain or moderate pain 12/16/20  Yes Tejal Howard MD   apixaban (ELIQUIS) 5 mg Take 1 tablet (5 mg total) by mouth 2 (two) times a day 12/17/20  Yes Tejal Howard MD   ascorbic acid (VITAMIN C) 500 mg tablet Take 500 mg by mouth daily    Yes Historical Provider, MD   Cholecalciferol ([employer] VITAMIN D) 2000 UNITS CAPS Take 2,000 Units by mouth 2 (two) times a day     Yes Historical Provider, MD   Chromium Picolinate (CHROMIUM PICOLATE PO) Take 1 tablet by mouth daily   Yes Historical Provider, MD   ezetimibe (ZETIA) 10 mg tablet TAKE 1 TABLET BY MOUTH  DAILY 9/4/20  Yes Kathy Browne MD   famotidine (PEPCID) 20 mg tablet Take 20 mg by mouth daily     Yes Historical Provider, MD   gabapentin (NEURONTIN) 300 mg capsule TAKE 2 CAPSULES BY MOUTH AT BEDTIME 6/26/20  Yes Mateo Moreira MD   losartan (COZAAR) 50 mg tablet Take 1 tablet (50 mg total) by mouth daily 1/6/21 4/6/21 Yes Cricket Campos,    metoprolol tartrate (LOPRESSOR) 25 mg tablet Take 1 tablet (25 mg total) by mouth every 12 (twelve) hours 1/5/21 4/5/21 Yes Cricket Campos,    rOPINIRole (REQUIP) 0 25 mg tablet TAKE 1 TABLET BY MOUTH  DAILY AT BEDTIME 10/2/20  Yes Kathy Browne MD   sotalol (BETAPACE) 120 mg tablet Take 1 tablet (120 mg total) by mouth every 12 (twelve) hours 12/21/20  Yes JENNY Jeffers   traMADol (ULTRAM) 50 mg tablet TAKE 1 TABLET BY MOUTH  TWICE DAILY AS NEEDED FOR  MODERATE PAIN 8/18/20  Yes Kathy Browne MD   TURMERIC PO Take 1 capsule by mouth 2 (two) times a day   Yes Historical Provider, MD   zolpidem (AMBIEN) 10 mg tablet Take 1 tablet (10 mg total) by mouth daily at bedtime as needed for sleep 10/9/20  Yes Alysha Bui MD     I have reviewed home medications with a medical source (PCP, Pharmacy, other)  Allergies: Allergies   Allergen Reactions    Penicillins Other (See Comments)     As a child calcium deposit in the arm     Ace Inhibitors GI Intolerance     Did feel well on it       Social History:     Marital Status: /Civil Union   Occupation:   Patient Pre-hospital Living Situation:  Lives with  in a ranch home  Daughter lives next door  Patient Pre-hospital Level of Mobility:  No restrictions  Patient Pre-hospital Diet Restrictions:   Substance Use History:   Social History     Substance and Sexual Activity   Alcohol Use Not Currently    Drinks per session: 1 or 2    Comment: occosional - every 3 months     Social History     Tobacco Use   Smoking Status Never Smoker   Smokeless Tobacco Never Used   Lifelong nonsmoker  Social History     Substance and Sexual Activity   Drug Use Never       Family History: Mother and father had heart disease at an older age, no premature coronary disease known    Physical Exam:     Vitals:   Blood Pressure: 140/63 (01/08/21 1322)  Pulse: (!) 50 (01/08/21 1322)  Temperature: 97 7 °F (36 5 °C) (01/08/21 1038)  Temp Source: Oral (01/08/21 1038)  Respirations: 16 (01/08/21 1322)  Weight - Scale: 69 1 kg (152 lb 5 4 oz) (01/08/21 1038)  SpO2: 95 % (01/08/21 1322)    Physical Exam  Vitals signs reviewed  Constitutional:       General: She is not in acute distress  Appearance: Normal appearance  She is not ill-appearing, toxic-appearing or diaphoretic  HENT:      Head: Normocephalic  Eyes:      General: No scleral icterus  Right eye: No discharge  Left eye: No discharge        Conjunctiva/sclera: Conjunctivae normal    Cardiovascular:      Rate and Rhythm: Regular rhythm  Bradycardia present  Pulses: Normal pulses  Heart sounds: No murmur  Pulmonary:      Effort: Pulmonary effort is normal  No respiratory distress  Breath sounds: Normal breath sounds  No stridor  No wheezing, rhonchi or rales  Comments: No evidence of dyspnea, tachypnea, cough, wheezing  Not requiring any oxygen  Chest:      Chest wall: No tenderness  Abdominal:      General: Bowel sounds are normal  There is no distension  Palpations: Abdomen is soft  Tenderness: There is no abdominal tenderness  There is no guarding  Musculoskeletal:         General: No swelling, tenderness, deformity or signs of injury  Right lower leg: No edema  Left lower leg: No edema  Skin:     General: Skin is warm and dry  Coloration: Skin is not jaundiced or pale  Findings: No bruising, erythema, lesion or rash  Neurological:      General: No focal deficit present  Mental Status: She is alert  Comments: Awake alert interactive follows commands appropriately, no evidence of confusion   Psychiatric:      Comments: Very pleasant and cooperative  Patient became tearful one point during our conversation and clearly appears to be heavily burdened by life stressors at this time       Additional Data:     Lab Results: I have personally reviewed pertinent reports        Results from last 7 days   Lab Units 01/08/21  1046   WBC Thousand/uL 5 52   HEMOGLOBIN g/dL 12 5   HEMATOCRIT % 38 4   PLATELETS Thousands/uL 231   NEUTROS PCT % 53   LYMPHS PCT % 32   MONOS PCT % 11   EOS PCT % 3     Results from last 7 days   Lab Units 01/08/21  1046   SODIUM mmol/L 142   POTASSIUM mmol/L 4 0   CHLORIDE mmol/L 106   CO2 mmol/L 28   BUN mg/dL 22   CREATININE mg/dL 0 92   ANION GAP mmol/L 8   CALCIUM mg/dL 9 3   ALBUMIN g/dL 3 8   TOTAL BILIRUBIN mg/dL 0 97   ALK PHOS U/L 63   ALT U/L 27   AST U/L 20   GLUCOSE RANDOM mg/dL 92     Results from last 7 days   Lab Units 01/08/21  1325   INR 1 17                   Imaging: I have personally reviewed pertinent reports  XR chest 1 view portable   Final Result by Monisha Rico MD (01/08 1111)   No acute cardiopulmonary disease  Findings are stable            Workstation performed: [id number]             EKG, Pathology, and Other Studies Reviewed on Admission:   · EKG: sinus bradycardia    Allscripts / Epic Records Reviewed: Yes     ** Please Note: This note has been constructed using a voice recognition system   **

## 2021-01-08 NOTE — DISCHARGE INSTR - AVS FIRST PAGE
Your cardiologist is making arrangements to have a heart rhythm monitoring device called a Zio patch sent to your house  Continue your same cardiac medications    Be sure to find ways to seek stress reduction

## 2021-01-08 NOTE — ED NOTES
Ambulatory to bathroom no assist with guide   No complications noted     Jullie Leyden, COLLEEN  01/08/21 [id number]

## 2021-01-08 NOTE — TELEPHONE ENCOUNTER
Nathan Clarke called to let you know that she is on her way to the [place] [facility] ED  She is feeling tightness in her chest with her heart racing on and off  She was just d/c'd from the hospital on 1/5 after admission with A- fib RVR

## 2021-01-08 NOTE — ED PROVIDER NOTES
History  Chief Complaint   Patient presents with    Chest Pain     Reports intermittent chest pain onset last night and this morning with brief periods of exertional SOB  Reports slight chest pressure at this time  The patient is a 58-year-old female with history of atrial fibrillation who presents to the emergency department for evaluation of chest pain that started last night  The patient describes the pain as a pressure in the center of her chest   She reports it initially began last night and lasted for approximately 15 minutes  At that time, she reported associated dyspnea upon exertion  She denied radiation of pain anywhere else  She states the pressure then came back this morning a couple of hours ago  She states the pain has been intermittent in nature as well as waxing and waning in intensity  She reports it is currently very low in intensity  She states she was feeling some shortness of breath with exertion again this morning, however she does not currently feel shortness of breath  She does not feel short of breath rest   The patient did not take any aspirin this morning  She states she is on Eliquis as well as metoprolol for her atrial fibrillation  She does also report intermittent feelings of her heart racing  She does not currently feel palpitations  She was recently hospitalized due to her atrial fibrillation  The daughter who is at bedside, the patient is currently under significant amount of stress as her  is very ill at home  Patient denies fever, chills, blurry vision, nausea, vomiting, headache, lightheadedness or dizziness        History provided by:  Patient and relative   used: No    Chest Pain  Associated symptoms: palpitations and shortness of breath    Associated symptoms: no abdominal pain, no back pain, no cough, no dizziness, no fever, no headache, no nausea and not vomiting        Prior to Admission Medications   Prescriptions Last Dose Informant Patient Reported? Taking?    Cholecalciferol ([employer] VITAMIN D) 2000 UNITS CAPS 1/8/2021 at Unknown time Self Yes Yes   Sig: Take 2,000 Units by mouth 2 (two) times a day     Chromium Picolinate (CHROMIUM PICOLATE PO) 1/8/2021 at Unknown time Self Yes Yes   Sig: Take 1 tablet by mouth daily   TURMERIC PO 1/8/2021 at Unknown time Self Yes Yes   Sig: Take 1 capsule by mouth 2 (two) times a day   acetaminophen (TYLENOL) 650 mg CR tablet 1/8/2021 at Unknown time Self No Yes   Sig: Take 1 tablet (650 mg total) by mouth every 6 (six) hours as needed for mild pain or moderate pain   apixaban (ELIQUIS) 5 mg 1/8/2021 at Unknown time Self No Yes   Sig: Take 1 tablet (5 mg total) by mouth 2 (two) times a day   ascorbic acid (VITAMIN C) 500 mg tablet 1/8/2021 at Unknown time Self Yes Yes   Sig: Take 500 mg by mouth daily    ezetimibe (ZETIA) 10 mg tablet 1/7/2021 at Unknown time Self No Yes   Sig: TAKE 1 TABLET BY MOUTH  DAILY   famotidine (PEPCID) 20 mg tablet 1/8/2021 at Unknown time Self Yes Yes   Sig: Take 20 mg by mouth daily     gabapentin (NEURONTIN) 300 mg capsule 1/7/2021 at Unknown time Self No Yes   Sig: TAKE 2 CAPSULES BY MOUTH AT BEDTIME   losartan (COZAAR) 50 mg tablet 1/8/2021 at Unknown time  No Yes   Sig: Take 1 tablet (50 mg total) by mouth daily   metoprolol tartrate (LOPRESSOR) 25 mg tablet 1/8/2021 at Unknown time  No Yes   Sig: Take 1 tablet (25 mg total) by mouth every 12 (twelve) hours   rOPINIRole (REQUIP) 0 25 mg tablet 1/7/2021 at Unknown time Self No Yes   Sig: TAKE 1 TABLET BY MOUTH  DAILY AT BEDTIME   sotalol (BETAPACE) 120 mg tablet 1/8/2021 at Unknown time  No Yes   Sig: Take 1 tablet (120 mg total) by mouth every 12 (twelve) hours   traMADol (ULTRAM) 50 mg tablet 1/7/2021 at Unknown time Self No Yes   Sig: TAKE 1 TABLET BY MOUTH  TWICE DAILY AS NEEDED FOR  MODERATE PAIN   zolpidem (AMBIEN) 10 mg tablet 1/7/2021 at Unknown time Self No Yes   Sig: Take 1 tablet (10 mg total) by mouth daily at bedtime as needed for sleep      Facility-Administered Medications: None       Past Medical History:   Diagnosis Date    Actinic keratosis     last assessed - 06Jun2014    Arthritis     Atrial fibrillation with rapid ventricular response (HCC)     last assessed - 26Apr2016    Basal cell carcinoma     Benign colon polyp     last assessed - 27Apr2015    Disease of thyroid gland     Effusion of knee joint right     Resolved - 19Apr2016    Esophageal reflux     Fibromyalgia     last assessed - [id number]    Fibromyalgia, primary     GERD (gastroesophageal reflux disease)     Hypertension     Palpitations     last assessed - 30Apr2013    Peroneal tendonitis, right     last assessed - 02Oct2013    Right lumbar radiculopathy 3/17/2016    Thyroid nodule     Trochanteric bursitis of left hip 3/9/2018       Past Surgical History:   Procedure Laterality Date    CATARACT EXTRACTION Bilateral     COLONOSCOPY  03/2018    EYE SURGERY      HYSTERECTOMY      JOINT REPLACEMENT Left     knee    KS REVISE MEDIAN N/CARPAL TUNNEL SURG Right 11/14/2019    Procedure: RELEASE CARPAL TUNNEL;  Surgeon: Bethany Landis MD;  [facility];  Service: Orthopedics    KS THYROID LOBECTOMY,UNILAT Left 12/16/2020    Procedure: Left THYROID LOBECTOMY;  Surgeon: Judy Lawler MD;  [facility];  Service: ENT    RECTAL POLYPECTOMY      REPLACEMENT TOTAL KNEE Left     last assessed - 27Apr2015    TONSILLECTOMY      TOTAL ABDOMINAL HYSTERECTOMY      TUBAL LIGATION      US GUIDED THYROID BIOPSY  10/14/2020       Family History   Problem Relation Age of Onset    Heart disease Mother     Diabetes Mother     Heart disease Father     Coronary artery disease Father     Stroke Father         cerebrovascular accident    Heart attack Father         myocardial infarction   Ondina Garsia Sudden death Father         scd    Other Family         Back disorder    Coronary artery disease Family     Neuropathy Family     Osteoporosis Family     No Known Problems Daughter     No Known Problems Maternal Grandmother     No Known Problems Maternal Grandfather     No Known Problems Paternal Grandmother     No Known Problems Paternal Grandfather     Cancer Maternal Uncle     Breast cancer Maternal Aunt 72    No Known Problems Son     No Known Problems Maternal Aunt     No Known Problems Maternal Aunt     No Known Problems Maternal Aunt     No Known Problems Paternal Aunt     No Known Problems Paternal Aunt     Anuerysm Neg Hx     Clotting disorder Neg Hx     Arrhythmia Neg Hx     Heart failure Neg Hx      I have reviewed and agree with the history as documented  E-Cigarette/Vaping    E-Cigarette Use Never User      E-Cigarette/Vaping Substances     Social History     Tobacco Use    Smoking status: Never Smoker    Smokeless tobacco: Never Used   Substance Use Topics    Alcohol use: Not Currently     Drinks per session: 1 or 2     Comment: occosional - every 3 months    Drug use: Never       Review of Systems   Constitutional: Negative for chills and fever  HENT: Negative for ear pain and sore throat  Eyes: Negative for redness and visual disturbance  Respiratory: Positive for shortness of breath  Negative for cough  Cardiovascular: Positive for chest pain and palpitations  Gastrointestinal: Negative for abdominal pain, diarrhea, nausea and vomiting  Genitourinary: Negative for dysuria and hematuria  Musculoskeletal: Negative for back pain, neck pain and neck stiffness  Skin: Negative for color change and rash  Neurological: Negative for dizziness, light-headedness and headaches  All other systems reviewed and are negative  Physical Exam  Physical Exam  Vitals signs and nursing note reviewed  Constitutional:       General: She is not in acute distress  Appearance: She is well-developed  She is not ill-appearing or toxic-appearing  HENT:      Head: Normocephalic and atraumatic  Mouth/Throat:      Pharynx: Uvula midline  Eyes:      General: Lids are normal       Conjunctiva/sclera: Conjunctivae normal    Neck:      Musculoskeletal: Normal range of motion and neck supple  Cardiovascular:      Rate and Rhythm: Regular rhythm  Bradycardia present  Pulses: Normal pulses  Heart sounds: Normal heart sounds  Pulmonary:      Effort: Pulmonary effort is normal       Breath sounds: Normal breath sounds  Abdominal:      General: There is no distension  Palpations: Abdomen is soft  Tenderness: There is no abdominal tenderness  Musculoskeletal:      Right lower leg: No edema  Left lower leg: No edema  Skin:     General: Skin is warm and dry  Neurological:      Mental Status: She is alert and oriented to person, place, and time           Vital Signs  ED Triage Vitals [01/08/21 1038]   Temperature Pulse Respirations Blood Pressure SpO2   97 7 °F (36 5 °C) (!) 53 16 (!) 190/88 99 %      Temp Source Heart Rate Source Patient Position - Orthostatic VS BP Location FiO2 (%)   Oral Monitor Sitting Right arm --      Pain Score       2           Vitals:    01/08/21 1038 01/08/21 1056 01/08/21 1127 01/08/21 1322   BP: (!) 190/88 162/77 168/72 140/63   Pulse: (!) 53 (!) 54 (!) 48 (!) 50   Patient Position - Orthostatic VS: Sitting Sitting Sitting Sitting         Visual Acuity  Visual Acuity      Most Recent Value   L Pupil Size (mm)  3   R Pupil Size (mm)  3          ED Medications  Medications   aspirin chewable tablet 324 mg (324 mg Oral Given 1/8/21 1055)       Diagnostic Studies  Results Reviewed     Procedure Component Value Units Date/Time    APTT [[id number]]  (Normal) Collected: 01/08/21 1325    Lab Status: Final result Specimen: Blood from Arm, Right Updated: 01/08/21 1351     PTT 32 seconds     Protime-INR [[id number]]  (Abnormal) Collected: 01/08/21 1325    Lab Status: Final result Specimen: Blood from Arm, Right Updated: 01/08/21 1345     Protime 15 1 seconds      INR 1 17    Comprehensive metabolic panel [[id number]] Collected: 01/08/21 1046    Lab Status: Final result Specimen: Blood from Arm, Left Updated: 01/08/21 1129     Sodium 142 mmol/L      Potassium 4 0 mmol/L      Chloride 106 mmol/L      CO2 28 mmol/L      ANION GAP 8 mmol/L      BUN 22 mg/dL      Creatinine 0 92 mg/dL      Glucose 92 mg/dL      Calcium 9 3 mg/dL      AST 20 U/L      ALT 27 U/L      Alkaline Phosphatase 63 U/L      Total Protein 7 1 g/dL      Albumin 3 8 g/dL      Total Bilirubin 0 97 mg/dL      eGFR 61 ml/min/1 73sq m     Narrative:      National Kidney Disease Foundation guidelines for Chronic Kidney Disease (CKD):     Stage 1 with normal or high GFR (GFR > 90 mL/min/1 73 square meters)    Stage 2 Mild CKD (GFR = 60-89 mL/min/1 73 square meters)    Stage 3A Moderate CKD (GFR = 45-59 mL/min/1 73 square meters)    Stage 3B Moderate CKD (GFR = 30-44 mL/min/1 73 square meters)    Stage 4 Severe CKD (GFR = 15-29 mL/min/1 73 square meters)    Stage 5 End Stage CKD (GFR <15 mL/min/1 73 square meters)  Note: GFR calculation is accurate only with a steady state creatinine    NT-BNP PRO [[id number]]  (Abnormal) Collected: 01/08/21 1046    Lab Status: Final result Specimen: Blood from Arm, Left Updated: 01/08/21 1129     NT-proBNP 761 pg/mL     Troponin I [[id number]]  (Normal) Collected: 01/08/21 1046    Lab Status: Final result Specimen: Blood from Arm, Left Updated: 01/08/21 1124     Troponin I <0 02 ng/mL     CBC and differential [[id number]]  (Abnormal) Collected: 01/08/21 1046    Lab Status: Final result Specimen: Blood from Arm, Left Updated: 01/08/21 1058     WBC 5 52 Thousand/uL      RBC 3 86 Million/uL      Hemoglobin 12 5 g/dL      Hematocrit 38 4 %       fL      MCH 32 4 pg      MCHC 32 6 g/dL      RDW 12 8 %      MPV 10 2 fL      Platelets 332 Thousands/uL      nRBC 0 /100 WBCs      Neutrophils Relative 53 %      Immat GRANS % 0 %      Lymphocytes Relative 32 %      Monocytes Relative 11 %      Eosinophils Relative 3 %      Basophils Relative 1 %      Neutrophils Absolute 2 98 Thousands/µL      Immature Grans Absolute 0 01 Thousand/uL      Lymphocytes Absolute 1 74 Thousands/µL      Monocytes Absolute 0 59 Thousand/µL      Eosinophils Absolute 0 16 Thousand/µL      Basophils Absolute 0 04 Thousands/µL                  XR chest 1 view portable   Final Result by Amena Stone MD (01/08 1111)   No acute cardiopulmonary disease  Findings are stable            Workstation performed: [id number]                    Procedures  ECG 12 Lead Documentation Only    Date/Time: 1/8/2021 11:05 AM  Performed by: Anahi Holly PA-C  Authorized by: Shaniqua Barrios PA-C     Comments:      Sinus bradycardia at 47  Normal axis  Nonspecific T-wave abnormality  No acute ST-T changes  ED Course  ED Course as of Jan 08 1357   Fri Jan 08, 2021   1059 Patient reports minimal chest pressure at this time  Blood pressure is improving without intervention  Will hold off nitroglycerin at this time  Patient got 324 mg aspirin  1150 Patient is resting comfortably at this time  Patient has a heart score of 6  Will work to admit at this time  1202 Patient admitted to [facility]  HEART Risk Score      Most Recent Value   Heart Score Risk Calculator   History  1 Filed at: 01/08/2021 1150   ECG  1 Filed at: 01/08/2021 1150   Age  2 Filed at: 01/08/2021 1150   Risk Factors  2 Filed at: 01/08/2021 1150   Troponin  0 Filed at: 01/08/2021 1150   HEART Score  6 Filed at: 01/08/2021 1150                      SBIRT 22yo+      Most Recent Value   SBIRT (25 yo +)   In order to provide better care to our patients, we are screening all of our patients for alcohol and drug use  Would it be okay to ask you these screening questions? Yes Filed at: 01/08/2021 1319   Initial Alcohol Screen: US AUDIT-C    1  How often do you have a drink containing alcohol? 1 Filed at: 01/08/2021 1319   2   How many drinks containing alcohol do you have on a typical day you are drinking? 0 Filed at: 01/08/2021 1319   3a  Male UNDER 65: How often do you have five or more drinks on one occasion? 0 Filed at: 01/08/2021 1319   3b  FEMALE Any Age, or MALE 65+: How often do you have 4 or more drinks on one occassion? 0 Filed at: 01/08/2021 1319   Audit-C Score  1 Filed at: 01/08/2021 1319   TARA: How many times in the past year have you    Used an illegal drug or used a prescription medication for non-medical reasons? Never Filed at: 01/08/2021 1319                    MDM  Number of Diagnoses or Management Options  Chest pain: new and requires workup  Dyspnea on exertion: new and requires workup  Diagnosis management comments: Patient presents for evaluation of chest pain that started this morning  Differential includes but is not limited to ACS versus pneumonia versus pleural effusion versus pneumothorax versus atrial fibrillation versus PE versus dissection  Low suspicion for PE or dissection at this time based on HPI, vitals and exam findings  Will rule out ACS  Labs, imaging and EKG ordered  Aspirin given  Labs reviewed with no significant findings  Chest x-ray is within normal limits  EKG does not show acute ischemic change  Patient has a heart score of 6, the decision was made to admit the patient for observation in the hospital   She is agreeable to this plan  Case was discussed with [facility] who agrees to accept the patient under the service of Dr Brandie Landry  Patient is stable for admission         Amount and/or Complexity of Data Reviewed  Clinical lab tests: ordered and reviewed  Tests in the radiology section of CPT®: ordered and reviewed  Decide to obtain previous medical records or to obtain history from someone other than the patient: yes  Obtain history from someone other than the patient: yes  Review and summarize past medical records: yes    Risk of Complications, Morbidity, and/or Mortality  Presenting problems: low  Diagnostic procedures: low  Management options: low    Patient Progress  Patient progress: stable      Disposition  Final diagnoses:   Chest pain   Dyspnea on exertion     Time reflects when diagnosis was documented in both MDM as applicable and the Disposition within this note     Time User Action Codes Description Comment    1/8/2021 12:00 PM Demetrius Lawsper Add [R07 9] Chest pain     1/8/2021 12:01 PM Demetrius Lawsper Add [R06 00] Dyspnea on exertion       ED Disposition     ED Disposition Condition Date/Time Comment    Admit Stable Fri Jan 8, 2021 12:00 PM Case was discussed with FLAKITA and the patient's admission status was agreed to be Admission Status: observation status to the service of Dr Mehrdad Plaza   Follow-up Information    None         Patient's Medications   Discharge Prescriptions    No medications on file     No discharge procedures on file      PDMP Review       Value Time User    PDMP Reviewed  Yes 8/18/2020  4:58 PM Sherman Rahman MD          ED Provider  Electronically Signed by           Shan Smith PA-C  01/08/21 6741

## 2021-01-08 NOTE — ASSESSMENT & PLAN NOTE
· Patient with known history of paroxysmal AFib recently admitted to this hospital and discharged on January 5th  During that admission she was seen by Cardiology and placed on regimen including sotalol 120 mg twice a day, metoprolol 25 mg twice a day, and Eliquis 5 mg twice a day  · Patient is currently in sinus bradycardia with normal QTC  · Case was reviewed with Cardiology    An appointment has already been made with EP specialist but they will attempt expedited and will also plan to arrange Zio patch for home heart rhythm monitoring

## 2021-01-09 VITALS
DIASTOLIC BLOOD PRESSURE: 53 MMHG | SYSTOLIC BLOOD PRESSURE: 116 MMHG | TEMPERATURE: 98.3 F | RESPIRATION RATE: 18 BRPM | HEART RATE: 54 BPM | OXYGEN SATURATION: 99 % | WEIGHT: 152.34 LBS | BODY MASS INDEX: 28.03 KG/M2 | HEIGHT: 62 IN

## 2021-01-09 PROCEDURE — 99217 PR OBSERVATION CARE DISCHARGE MANAGEMENT: CPT | Performed by: PHYSICIAN ASSISTANT

## 2021-01-09 RX ADMIN — SOTALOL HYDROCHLORIDE 120 MG: 80 TABLET ORAL at 03:46

## 2021-01-09 RX ADMIN — LOSARTAN POTASSIUM 50 MG: 50 TABLET, FILM COATED ORAL at 08:19

## 2021-01-09 RX ADMIN — EZETIMIBE 10 MG: 10 TABLET ORAL at 08:19

## 2021-01-09 RX ADMIN — APIXABAN 5 MG: 5 TABLET, FILM COATED ORAL at 08:19

## 2021-01-09 RX ADMIN — FAMOTIDINE 20 MG: 20 TABLET ORAL at 08:19

## 2021-01-09 RX ADMIN — Medication 2000 UNITS: at 08:20

## 2021-01-09 RX ADMIN — METOPROLOL TARTRATE 25 MG: 25 TABLET, FILM COATED ORAL at 08:19

## 2021-01-09 RX ADMIN — OXYCODONE HYDROCHLORIDE AND ACETAMINOPHEN 500 MG: 500 TABLET ORAL at 08:19

## 2021-01-09 NOTE — QUICK NOTE
Patient w/ 3 negative troponins and no return of sx  Discussed d/c w/ patient and she does not have a reliable form of transportation home   Plan to d/c tomorrow AM

## 2021-01-09 NOTE — PLAN OF CARE
Problem: SAFETY ADULT  Goal: Patient will remain free of falls  Description: INTERVENTIONS:  - Assess patient frequently for physical needs  -  Identify cognitive and physical deficits and behaviors that affect risk of falls    -  [place] fall precautions as indicated by assessment   - Educate patient/family on patient safety including physical limitations  - Instruct patient to call for assistance with activity based on assessment  - Modify environment to reduce risk of injury  - Consider OT/PT consult to assist with strengthening/mobility  Outcome: Progressing  Goal: Maintain or return to baseline ADL function  Description: INTERVENTIONS:  -  Assess patient's ability to carry out ADLs; assess patient's baseline for ADL function and identify physical deficits which impact ability to perform ADLs (bathing, care of mouth/teeth, toileting, grooming, dressing, etc )  - Assess/evaluate cause of self-care deficits   - Assess range of motion  - Assess patient's mobility; develop plan if impaired  - Assess patient's need for assistive devices and provide as appropriate  - Encourage maximum independence but intervene and supervise when necessary  - Involve family in performance of ADLs  - Assess for home care needs following discharge   - Consider OT consult to assist with ADL evaluation and planning for discharge  - Provide patient education as appropriate  Outcome: Progressing  Goal: Maintain or return mobility status to optimal level  Description: INTERVENTIONS:  - Assess patient's baseline mobility status (ambulation, transfers, stairs, etc )    - Identify cognitive and physical deficits and behaviors that affect mobility  - Identify mobility aids required to assist with transfers and/or ambulation (gait belt, sit-to-stand, lift, walker, cane, etc )  - [place] fall precautions as indicated by assessment  - Record patient progress and toleration of activity level on Mobility SBAR; progress patient to next Phase/Stage  - Instruct patient to call for assistance with activity based on assessment  - Consider rehabilitation consult to assist with strengthening/weightbearing, etc   Outcome: Progressing     Problem: DISCHARGE PLANNING  Goal: Discharge to home or other facility with appropriate resources  Description: INTERVENTIONS:  - Identify barriers to discharge w/patient and caregiver  - Arrange for needed discharge resources and transportation as appropriate  - Identify discharge learning needs (meds, wound care, etc )  - Refer to Case Management Department for coordinating discharge planning if the patient needs post-hospital services based on physician/advanced practitioner order or complex needs related to functional status, cognitive ability, or social support system  Outcome: Progressing     Problem: CARDIOVASCULAR - ADULT  Goal: Maintains optimal cardiac output and hemodynamic stability  Description: INTERVENTIONS:  - Monitor I/O, vital signs and rhythm  - Monitor for S/S and trends of decreased cardiac output  - Administer and titrate ordered vasoactive medications to optimize hemodynamic stability  - Assess quality of pulses, skin color and temperature  - Assess for signs of decreased coronary artery perfusion  - Instruct patient to report change in severity of symptoms  Outcome: Progressing  Goal: Absence of cardiac dysrhythmias or at baseline rhythm  Description: INTERVENTIONS:  - Continuous cardiac monitoring, vital signs, obtain 12 lead EKG if ordered  - Administer antiarrhythmic and heart rate control medications as ordered  - Monitor electrolytes and administer replacement therapy as ordered  Outcome: Progressing

## 2021-01-11 ENCOUNTER — TRANSITIONAL CARE MANAGEMENT (OUTPATIENT)
Dept: FAMILY MEDICINE CLINIC | Facility: CLINIC | Age: 76
End: 2021-01-11

## 2021-01-11 ENCOUNTER — OFFICE VISIT (OUTPATIENT)
Dept: FAMILY MEDICINE CLINIC | Facility: CLINIC | Age: 76
End: 2021-01-11
Payer: MEDICARE

## 2021-01-11 VITALS
BODY MASS INDEX: 27.97 KG/M2 | HEART RATE: 60 BPM | SYSTOLIC BLOOD PRESSURE: 132 MMHG | TEMPERATURE: 96.9 F | WEIGHT: 152 LBS | DIASTOLIC BLOOD PRESSURE: 78 MMHG | HEIGHT: 62 IN | RESPIRATION RATE: 16 BRPM

## 2021-01-11 DIAGNOSIS — F43.22 ADJUSTMENT REACTION WITH ANXIETY: ICD-10-CM

## 2021-01-11 DIAGNOSIS — R07.89 CHEST PRESSURE: Primary | ICD-10-CM

## 2021-01-11 DIAGNOSIS — I10 ESSENTIAL HYPERTENSION: ICD-10-CM

## 2021-01-11 DIAGNOSIS — I50.32 CHRONIC DIASTOLIC CHF (CONGESTIVE HEART FAILURE) (HCC): ICD-10-CM

## 2021-01-11 DIAGNOSIS — C73 MALIGNANT NEOPLASM OF THYROID GLAND (HCC): ICD-10-CM

## 2021-01-11 DIAGNOSIS — E78.00 PURE HYPERCHOLESTEROLEMIA: ICD-10-CM

## 2021-01-11 DIAGNOSIS — Z63.79 STRESS DUE TO ILLNESS OF FAMILY MEMBER: ICD-10-CM

## 2021-01-11 DIAGNOSIS — I48.0 PAROXYSMAL ATRIAL FIBRILLATION (HCC): ICD-10-CM

## 2021-01-11 DIAGNOSIS — R00.2 PALPITATIONS: ICD-10-CM

## 2021-01-11 PROBLEM — E04.1 THYROID NODULE: Status: RESOLVED | Noted: 2020-11-10 | Resolved: 2021-01-11

## 2021-01-11 PROBLEM — Z01.810 PREOP CARDIOVASCULAR EXAM: Status: RESOLVED | Noted: 2020-12-09 | Resolved: 2021-01-11

## 2021-01-11 PROCEDURE — 99496 TRANSJ CARE MGMT HIGH F2F 7D: CPT | Performed by: FAMILY MEDICINE

## 2021-01-11 RX ORDER — ESCITALOPRAM OXALATE 5 MG/1
5 TABLET ORAL DAILY
Qty: 30 TABLET | Refills: 2 | Status: SHIPPED | OUTPATIENT
Start: 2021-01-11 | End: 2021-04-26

## 2021-01-11 NOTE — PROGRESS NOTES
TCM Call (since 12/11/2020)     Date and time call was made  1/11/2021  9:34 AM    Hospital care reviewed  Records reviewed    Patient was hospitialized at  [address] [facility]  Atrial fibrillation with rapid ventricular response     Date of Admission  01/08/21    Date of discharge  01/09/21    Diagnosis  chest pressure    Disposition  Home    Were the patients medications reviewed and updated  Yes    Current Symptoms  None    Cough Severity  Mild    Fatigue severity  Mild      TCM Call (since 12/11/2020)     Post hospital issues  None    Should patient be enrolled in anticoag monitoring? No    Scheduled for follow up? Yes    Patients specialists  Cardiologist    Cardiologist name  Dr Soren Matos     Did you obtain your prescribed medications  Yes    Do you need help managing your prescriptions or medications  No    Is transportation to your appointment needed  No    I have advised the patient to call PCP with any new or worsening symptoms  [facility]    [address] [place] [address]    The type of support provided  Emotional; Financial; Physical    Do you have social support  Yes, as much as I need    Are you recieving any outpatient services  No    Are you recieving home care services  No    Are you using any community resources  No    Current waiver services  No    Have you fallen in the last 12 months  No    Interperter language line needed  No    Counseling  Patient    Counseling topics  Prognosis; Importance of RX compliance            Assessment/Plan:     Diagnoses and all orders for this visit:    Chest pressure    Palpitations    Adjustment reaction with anxiety  -     escitalopram (LEXAPRO) 5 mg tablet;  Take 1 tablet (5 mg total) by mouth daily    Stress due to illness of family member    Paroxysmal atrial fibrillation ([facility] [address] )    Chronic diastolic CHF (congestive heart failure) ([facility] [address] )    Essential hypertension    Pure hypercholesterolemia    Malignant neoplasm of thyroid gland ([facility] [address] )           Continue with current medications  Patient requesting medication- start low-dose Lexapro 5 mg in the morning with food  Dose can be titrated after several weeks  Follow-up with Cardiology, ENT  Patient and her daughter-Coral- who I spoke with via Face time during the visit are looking into additional home services to keep patient at home    BMI Counseling: Body mass index is 27 8 kg/m²  The BMI is above normal  Nutrition recommendations include reducing portion sizes, decreasing overall calorie intake, consuming healthier snacks, moderation in carbohydrate intake, reducing intake of saturated fat and trans fat and reducing intake of cholesterol  Exercise recommendations include exercising 3-5 times per week  Patient ID: Margaret Bailey is a 67 y o  female  Follow up visit  [facility] 01/08/2021 to 01/09/2020 patient was admitted with chest pressure and sensation of heart racing symptoms resolved without medications  Admission sinus bradycardia with no acute changes  Troponin x 3 negative  Chest x-ray no active disease  Patient had a previous admission 01/03/2021 to 01/05/2021 for atrial fibrillation with rapid ventricular response  Patient reverted to normal sinus rhythm  She was discharged on Sotalol 120 mg twice a day, Metoprolol tartrate 25 mg twice a day and Eliquis 5 mg BID  Echocardiogram showed normal left ventricular systolic function  EF 55%  Mildly to moderately dilated left atrium  Mildly dilated right atrium  Mild mitral stenosis  Mild to moderate tricuspid regurgitation  Pulmonary artery systolic pressure mildly increased  Hypertension blood pressures have been stable on her current regimen  01/2021 creatinine 0 92  Electrolytes normal   Hemoglobin 12 5  Hyperlipidemia on Zetia 10 mg daily  08/2020 lipid profile cholesterol 182  Triglycerides 121  HDL 54     LFTs normal except for total bilirubin 1 03  FBS 91  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 for multi-system atrophy/ progressive neurologic impairment    Lab Results   Component Value Date    WBC 5 52 01/08/2021    HGB 12 5 01/08/2021    HCT 38 4 01/08/2021     (H) 01/08/2021     01/08/2021     Lab Results   Component Value Date    SODIUM 142 01/08/2021    K 4 0 01/08/2021     01/08/2021    CO2 28 01/08/2021    BUN 22 01/08/2021    CREATININE 0 92 01/08/2021    GLUC 92 01/08/2021    CALCIUM 9 3 01/08/2021     Lab Results   Component Value Date    CHOLESTEROL 182 08/03/2020    CHOLESTEROL 184 11/25/2019    CHOLESTEROL 202 (H) 09/16/2018     Lab Results   Component Value Date    HDL 54 08/03/2020    HDL 57 11/25/2019    HDL 59 09/16/2018     Lab Results   Component Value Date    TRIG 121 08/03/2020    TRIG 106 11/25/2019    TRIG 109 09/16/2018     Lab Results   Component Value Date    [id number] 2 444 01/03/2021     Lab Results   Component Value Date    NTBNP 761 (H) 01/08/2021              The following portions of the patient's history were reviewed and updated as appropriate: allergies, current medications, past family history, past medical history, past social history, past surgical history and problem list     Review of Systems   Constitutional: Negative for appetite change, chills, fatigue, fever and unexpected weight change  HENT: Positive for hearing loss (bilateral hearing aids )  Negative for congestion, ear pain, rhinorrhea, sore throat and trouble swallowing  Eyes: Negative for visual disturbance  Respiratory: Negative for cough, shortness of breath and wheezing  Cardiovascular: Negative for chest pain, palpitations and leg swelling  See HPI PAF followed by cardiology  06/2020 24 hour Holter monitor predominately sinus bradycardia with avg HR 59 beats/min  Low burden of PACs and PVCs  No significant pauses or advanced heart block   Admission 9/15/2018 through 9/17/2018 for atrial fibrillation with rapid ventricular response  Patient converted to normal sinus rhythm with IV Cardizem  Electrolytes normal   TSH 1 077  Hemoglobin 14 2  Troponin x 3 normal   Magnesium 2 0   Echocardiogram normal left ventricular systolic function  EF 60%  Grade 2 diastolic dysfunction  Right ventricle systolic pressure mildly increased  Mildly dilated left atrium  Mild MR/TR  No pericardial effusion     Gastrointestinal: Negative for abdominal pain, blood in stool, constipation, diarrhea, nausea and vomiting  Colonoscopy 04/2018 Two benign polyps  Mild diverticulosis left-sided colon  Endocrine: Negative for polydipsia and polyuria  12/2020 status post left thyroid lobectomy for left thyroid nodule  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  09/2018 DEXA scan T-score -1 5 left femoral neck  On vitamin D supplement   Genitourinary: Negative for difficulty urinating  Musculoskeletal: Negative for arthralgias and myalgias  Bilateral shoulder pain symptom relief with Tylenol Arthritis and Tramadol   07/2019 x-rays right shoulder mild degenerative arthritis right AC joint  Left shoulder mild degenerative changes left AC joint  08/2019 bilateral shoulder intra-articular steroid injections via fluoroscopy with symptomatic relief  10/2018 s/p right TKR  Skin: Negative for rash  Neurological: Negative for dizziness and headaches  RLS on Requip   Hematological: Negative for adenopathy  Does not bruise/bleed easily  Psychiatric/Behavioral: Negative for dysphoric mood and sleep disturbance  The patient is nervous/anxious            Objective:      /78   Pulse 60   Temp (!) 96 9 °F (36 1 °C)   Resp 16   Ht 5' 2" (1 575 m)   Wt 68 9 kg (152 lb)   LMP 02/01/1990 (Within Weeks)   BMI 27 80 kg/m²       BP Readings from Last 3 Encounters:   01/11/21 132/78   01/09/21 116/53   01/05/21 113/55     Wt Readings from Last 3 Encounters:   01/11/21 68 9 kg (152 lb)   01/08/21 69 1 kg (152 lb 5 4 oz)   01/05/21 68 1 kg (150 lb 3 2 oz)          Physical Exam  Vitals signs and nursing note reviewed  Constitutional:       General: She is not in acute distress  Appearance: She is well-developed  HENT:      Right Ear: Tympanic membrane and ear canal normal       Left Ear: Tympanic membrane and ear canal normal    Eyes:      General: No scleral icterus  Extraocular Movements: Extraocular movements intact  Conjunctiva/sclera: Conjunctivae normal       Pupils: Pupils are equal, round, and reactive to light  Neck:      Thyroid: No thyroid mass or thyromegaly  Vascular: No carotid bruit or JVD  Trachea: No tracheal deviation  Cardiovascular:      Rate and Rhythm: Normal rate and regular rhythm  Heart sounds: Normal heart sounds  No murmur  No gallop  Pulmonary:      Effort: Pulmonary effort is normal  No respiratory distress  Breath sounds: Normal breath sounds  No wheezing or rales  Abdominal:      General: Bowel sounds are normal  There is no distension or abdominal bruit  Palpations: Abdomen is soft  There is no hepatomegaly, splenomegaly or mass  Tenderness: There is no abdominal tenderness  There is no guarding or rebound  Musculoskeletal:      Right lower leg: No edema  Left lower leg: No edema  Lymphadenopathy:      Cervical: No cervical adenopathy  Upper Body:      Right upper body: No supraclavicular adenopathy  Left upper body: No supraclavicular adenopathy  Skin:     Findings: No rash  Nails: There is no clubbing  Neurological:      General: No focal deficit present  Mental Status: She is alert and oriented to person, place, and time     Psychiatric:         Mood and Affect: Mood normal

## 2021-01-12 ENCOUNTER — PATIENT OUTREACH (OUTPATIENT)
Dept: FAMILY MEDICINE CLINIC | Facility: CLINIC | Age: 76
End: 2021-01-12

## 2021-01-12 ENCOUNTER — OFFICE VISIT (OUTPATIENT)
Dept: PAIN MEDICINE | Facility: CLINIC | Age: 76
End: 2021-01-12
Payer: MEDICARE

## 2021-01-12 VITALS
BODY MASS INDEX: 27.97 KG/M2 | DIASTOLIC BLOOD PRESSURE: 62 MMHG | HEART RATE: 72 BPM | HEIGHT: 62 IN | WEIGHT: 152 LBS | SYSTOLIC BLOOD PRESSURE: 122 MMHG

## 2021-01-12 DIAGNOSIS — M47.816 LUMBAR SPONDYLOSIS: ICD-10-CM

## 2021-01-12 DIAGNOSIS — M79.7 FIBROMYALGIA: ICD-10-CM

## 2021-01-12 DIAGNOSIS — G89.4 CHRONIC PAIN SYNDROME: Primary | ICD-10-CM

## 2021-01-12 PROCEDURE — 99214 OFFICE O/P EST MOD 30 MIN: CPT | Performed by: NURSE PRACTITIONER

## 2021-01-12 RX ORDER — GABAPENTIN 300 MG/1
CAPSULE ORAL
Qty: 60 CAPSULE | Refills: 0 | Status: SHIPPED | OUTPATIENT
Start: 2021-01-12 | End: 2021-02-01 | Stop reason: SDUPTHER

## 2021-01-12 NOTE — PROGRESS NOTES
Spoke with Susan Lozada she declines to participate in [facility] program at this time   Per chart review patient is attending her follow up appointments

## 2021-01-12 NOTE — PROGRESS NOTES
Pain Medicine Follow-Up Note    Assessment:  1  Chronic pain syndrome    2  Lumbar spondylosis    3  Fibromyalgia        Plan:  The patient continues with low back pain and fibromyalgia pain symptoms that continue to be well controlled with the use of gabapentin 300 mg, 2 caps at bedtime  I will continue the patient with her medications as prescribed  A prescription was electronically sent to the patient's pharmacy on file  Follow-up is planned in 6 months time or sooner as warranted  Discharge instructions were provided  I personally saw and examined the patient and I agree with the above discussed plan of care  My impressions and treatment recommendations were discussed in detail with the patient who verbalized understanding and had no further questions  No orders of the defined types were placed in this encounter  New Medications Ordered This Visit   Medications    gabapentin (NEURONTIN) 300 mg capsule     Sig: Take 2 caps by mouth at bedtime     Dispense:  60 capsule     Refill:  0     History of Present Illness:    Trevon Jack is a 67 y o  female who presents to [facility] for interval re-evaluation of the above stated pain complaints  The patient has a past medical and chronic pain history as outlined in the assessment section  She was last seen on 8/20/2019 in regards to chronic pain syndrome secondary to lumbar spondylosis and fibromyalgia  At the last office visit, the patient had discuss weaning the gabapentin; however, the patient reports she has restarted the gabapentin and continues to take gabapentin 300 mg 2 tablets at bedtime with positive relief of her pain symptoms  The patient reports continued pain relief after bilateral shoulder injections at the last visit  The patient currently rates her pain as 2/10 on the numeric rating scale  Current pain medications include:  Gabapentin 300 mg, 2 capsules at bedtime    The patient reports this pain medication regimen provides 80% relief of her pain symptoms with no noted side effects  Other than as stated above, the patient denies any interval changes in medications, medical condition, mental condition, symptoms, or allergies since the last office visit  Review of Systems:    Review of Systems   Respiratory: Negative for shortness of breath  Cardiovascular: Negative for chest pain  Gastrointestinal: Negative for constipation, diarrhea, nausea and vomiting  Musculoskeletal: Positive for gait problem  Negative for arthralgias, joint swelling and myalgias  Joint stiffness  Pain in rt foot   Skin: Negative for rash  Neurological: Negative for dizziness, seizures and weakness  All other systems reviewed and are negative        Patient Active Problem List   Diagnosis    Essential hypertension    Allergic rhinitis    Fibromyalgia    Hyperlipidemia    Insomnia    Lumbar spondylosis    Lumbar stenosis    Osteoarthrosis, hand    Osteoarthritis of knee    Osteopenia    Paroxysmal atrial fibrillation (HCC)    Peripheral neuropathy    Restless legs syndrome    TMJ syndrome    Vitamin D deficiency    Osteoarthritis of shoulder    Carpal tunnel syndrome on right    Arthritis of both acromioclavicular joints    Chronic rhinitis    Chronic diastolic CHF (congestive heart failure) ([facility] [address] [facility]    Chest pressure    Stress due to illness of family member    Malignant neoplasm of thyroid gland ([facility] [address] )       Past Medical History:   Diagnosis Date    Actinic keratosis     last assessed - [id number]    Arthritis     Atrial fibrillation with rapid ventricular response (HCC)     last assessed - 26Apr2016    Basal cell carcinoma     Benign colon polyp     last assessed - 27Apr2015    Disease of thyroid gland     Effusion of knee joint right     Resolved - [id number]    Esophageal reflux     Fibromyalgia     last assessed - [id number]    Fibromyalgia, primary     GERD (gastroesophageal reflux disease)     Hypertension     Palpitations     last assessed - [id number]    Peroneal tendonitis, right     last assessed - [id number]    Right lumbar radiculopathy 3/17/2016    Thyroid nodule     Trochanteric bursitis of left hip 3/9/2018       Past Surgical History:   Procedure Laterality Date    CATARACT EXTRACTION Bilateral     COLONOSCOPY  03/2018    EYE SURGERY      HYSTERECTOMY      JOINT REPLACEMENT Left     knee    CA REVISE MEDIAN N/CARPAL TUNNEL SURG Right 11/14/2019    Procedure: RELEASE CARPAL TUNNEL;  Surgeon: Radha Sy MD;  [facility];  Service: Orthopedics    CA THYROID LOBECTOMY,UNILAT Left 12/16/2020    Procedure: Left THYROID LOBECTOMY;  Surgeon: Sabino Cooper MD;  [facility];  Service: ENT    RECTAL POLYPECTOMY      REPLACEMENT TOTAL KNEE Left     last assessed - 27Apr2015    TONSILLECTOMY      TOTAL ABDOMINAL HYSTERECTOMY      TUBAL LIGATION      US GUIDED THYROID BIOPSY  10/14/2020       Family History   Problem Relation Age of Onset    Heart disease Mother     Diabetes Mother     Heart disease Father     Coronary artery disease Father     Stroke Father         cerebrovascular accident    Heart attack Father         myocardial infarction    Sudden death Father         scd    Other Family         Back disorder    Coronary artery disease Family     Neuropathy Family     Osteoporosis Family     No Known Problems Daughter     No Known Problems Maternal Grandmother     No Known Problems Maternal Grandfather     No Known Problems Paternal Grandmother     No Known Problems Paternal Grandfather     Cancer Maternal Uncle     Breast cancer Maternal Aunt 72    No Known Problems Son     No Known Problems Maternal Aunt     No Known Problems Maternal Aunt     No Known Problems Maternal Aunt     No Known Problems Paternal Aunt     No Known Problems Paternal Aunt     Anuerysm Neg Hx     Clotting disorder Neg Hx     Arrhythmia Neg Hx     Heart failure Neg Hx        Social History     Occupational History    Not on file   Tobacco Use    Smoking status: Never Smoker    Smokeless tobacco: Never Used   Substance and Sexual Activity    Alcohol use: Not Currently     Drinks per session: 1 or 2     Comment: occosional - every 3 months    Drug use: Never    Sexual activity: Not Currently         Current Outpatient Medications:     acetaminophen (TYLENOL) 650 mg CR tablet, Take 1 tablet (650 mg total) by mouth every 6 (six) hours as needed for mild pain or moderate pain, Disp: 30 tablet, Rfl: 0    apixaban (ELIQUIS) 5 mg, Take 1 tablet (5 mg total) by mouth 2 (two) times a day, Disp: 180 tablet, Rfl: 0    ascorbic acid (VITAMIN C) 500 mg tablet, Take 500 mg by mouth daily , Disp: , Rfl:     Cholecalciferol ([employer] VITAMIN D) 2000 UNITS CAPS, Take 2,000 Units by mouth 2 (two) times a day  , Disp: , Rfl:     Chromium Picolinate (CHROMIUM PICOLATE PO), Take 1 tablet by mouth daily, Disp: , Rfl:     escitalopram (LEXAPRO) 5 mg tablet, Take 1 tablet (5 mg total) by mouth daily, Disp: 30 tablet, Rfl: 2    ezetimibe (ZETIA) 10 mg tablet, TAKE 1 TABLET BY MOUTH  DAILY, Disp: 90 tablet, Rfl: 3    famotidine (PEPCID) 20 mg tablet, Take 20 mg by mouth daily  , Disp: , Rfl:     gabapentin (NEURONTIN) 300 mg capsule, Take 2 caps by mouth at bedtime, Disp: 60 capsule, Rfl: 0    losartan (COZAAR) 50 mg tablet, Take 1 tablet (50 mg total) by mouth daily, Disp: 90 tablet, Rfl: 0    metoprolol tartrate (LOPRESSOR) 25 mg tablet, Take 1 tablet (25 mg total) by mouth every 12 (twelve) hours, Disp: 180 tablet, Rfl: 0    rOPINIRole (REQUIP) 0 25 mg tablet, TAKE 1 TABLET BY MOUTH  DAILY AT BEDTIME, Disp: 90 tablet, Rfl: 3    sotalol (BETAPACE) 120 mg tablet, Take 1 tablet (120 mg total) by mouth every 12 (twelve) hours, Disp: 180 tablet, Rfl: 3    traMADol (ULTRAM) 50 mg tablet, TAKE 1 TABLET BY MOUTH  TWICE DAILY AS NEEDED FOR  MODERATE PAIN, Disp: 180 tablet, Rfl: 1    TURMERIC PO, Take 1 capsule by mouth 2 (two) times a day, Disp: , Rfl:     zolpidem (AMBIEN) 10 mg tablet, Take 1 tablet (10 mg total) by mouth daily at bedtime as needed for sleep, Disp: 90 tablet, Rfl: 1    Allergies   Allergen Reactions    Penicillins Other (See Comments)     As a child calcium deposit in the arm     Ace Inhibitors GI Intolerance     Did feel well on it       Physical Exam:    /62   Pulse 72   Ht 5' 2" (1 575 m)   Wt 68 9 kg (152 lb)   LMP 02/01/1990 (Within Weeks)   BMI 27 80 kg/m²     Constitutional:normal, well developed, well nourished, alert, in no distress and non-toxic and no overt pain behavior  Eyes:anicteric  HEENT:grossly intact  Neck:supple, symmetric, trachea midline and no masses   Pulmonary:even and unlabored  Cardiovascular:No edema or pitting edema present  Skin:Normal without rashes or lesions and well hydrated  Psychiatric:Mood and affect appropriate  Neurologic:Cranial Nerves II-XII grossly intact  Musculoskeletal:normal      Imaging  No orders to display         No orders of the defined types were placed in this encounter

## 2021-01-12 NOTE — PATIENT INSTRUCTIONS
Gabapentin (By mouth)   Gabapentin (vinod-a-PEN-tin)  Treats seizures and pain caused by shingles  Brand Name(s): FusePaq Fanatrex, Gralise, Neurontin   There may be other brand names for this medicine  When This Medicine Should Not Be Used: This medicine is not right for everyone  Do not use it if you had an allergic reaction to gabapentin  How to Use This Medicine:   Capsule, Liquid, Tablet  · Take your medicine as directed  Your dose may need to be changed several times to find what works best for you  If you have epilepsy, do not allow more than 12 hours to pass between doses  · Capsule: Swallow the capsule whole with plenty of water  Do not open, crush, or chew it  · Gralise® tablet: Swallow the tablet whole   Do not crush, break, or chew it  · Neurontin® tablet: If you break a tablet into 2 pieces, use the second half as your next dose  Do not use the half-tablet if the whole tablet has been cut or broken after 28 days  · Oral liquid: Measure the oral liquid medicine with a marked measuring spoon, oral syringe, or medicine cup  · This medicine should come with a Medication Guide  Ask your pharmacist for a copy if you do not have one  · Missed dose: Take a dose as soon as you remember  If it is almost time for your next dose, wait until then and take a regular dose  Do not take extra medicine to make up for a missed dose  · Store the medicine in a closed container at room temperature, away from heat, moisture, and direct light  Store the Neurontin® oral liquid in the refrigerator  Do not freeze  Drugs and Foods to Avoid:   Ask your doctor or pharmacist before using any other medicine, including over-the-counter medicines, vitamins, and herbal products  · Some medicines can affect how gabapentin works  Tell your doctor if you also using hydrocodone or morphine  · If you take an antacid, wait at least 2 hours before you take gabapentin    · Do not drink alcohol while you are using this medicine  · Tell your doctor if you use anything else that makes you sleepy  Some examples are allergy medicine, narcotic pain medicine, and alcohol  Tell your doctor if you are also using lorazepam, oxycodone, or zolpidem  Warnings While Using This Medicine:   · Tell your doctor if you are pregnant or breastfeeding, or if you have kidney problems (including patients receiving dialysis) or lung problems  Tell your doctor if you have a history of depression or mental health problems  · This medicine may cause the following problems:  ? Drug reaction with eosinophilia and systemic symptoms (DRESS) or multiorgan hypersensitivity, which may damage the liver, kidney, blood, heart, or muscles  ? Changes in mood or behavior, including suicidal thoughts or behavior  ? Respiratory depression (serious breathing problem that can be life-threatening), when used with narcotic pain medicines  · Do not stop using this medicine suddenly  Your doctor will need to slowly decrease your dose before you stop it completely  · This medicine may make you dizzy or drowsy  Do not drive or do anything else that could be dangerous until you know how this medicine affects you  · Tell any doctor or dentist who treats you that you are using this medicine  This medicine may affect certain medical test results  · Your doctor will check your progress and the effects of this medicine at regular visits  Keep all appointments  · Keep all medicine out of the reach of children  Never share your medicine with anyone    Possible Side Effects While Using This Medicine:   Call your doctor right away if you notice any of these side effects:  · Allergic reaction: Itching or hives, swelling in your face or hands, swelling or tingling in your mouth or throat, chest tightness, trouble breathing  · Behavior problems, aggression, restlessness, trouble concentrating, moodiness (especially in children)  · Blistering, peeling, red skin rash  · Blue lips, fingernails, or skin, chest pain, fast heartbeat, trouble breathing  · Change in how much or how often you urinate, bloody or cloudy urine  · Dark urine or pale stools, nausea, vomiting, loss of appetite, stomach pain, yellow skin or eyes  · Fever, chills, cough, sore throat, body aches  · Problems with coordination, shakiness, unsteadiness, unusual eye movement  · Rapid weight gain, swelling in your hands, ankles, or feet  · Rash, swollen or tender glands in the neck, armpit, or groin  · Unusual moods or behaviors, thoughts of hurting yourself, feeling depressed  If you notice these less serious side effects, talk with your doctor:   · Dizziness, drowsiness, sleepiness, tiredness  If you notice other side effects that you think are caused by this medicine, tell your doctor  Call your doctor for medical advice about side effects  You may report side effects to FDA at 4-136-FDA-6356  © Copyright [facility] [address] Information is for End User's use only and may not be sold, redistributed or otherwise used for commercial purposes  The above information is an  only  It is not intended as medical advice for individual conditions or treatments  Talk to your doctor, nurse or pharmacist before following any medical regimen to see if it is safe and effective for you

## 2021-01-13 ENCOUNTER — TRANSCRIBE ORDERS (OUTPATIENT)
Dept: PAIN MEDICINE | Facility: CLINIC | Age: 76
End: 2021-01-13

## 2021-01-18 NOTE — DISCHARGE SUMMARY
Discharge- Katy Chavez 1945, 67 y o  female MRN: [id number]    Unit/Bed#: S -01 Encounter: [id number]    Primary Care Provider: Gallo Pagan MD   Date and time admitted to hospital: 1/8/2021 10:33 AM    * Chest pressure  Assessment & Plan  · Patient presented after experiencing a 15 minutes episode of chest pressure/tightness this morning and slight sense of heart racing which resolved without medication, with taking a few deep calming breaths  · Trop negative x 3  EKG on admission nonischemic  · Patient has no history of heart disease and KYLIE score is 1 based on age; she is also known to have hypertension and dyslipidemia  · Nuclear Myoview stress test 2016 noted to be normal  · Suspect that her symptoms are more likely due to significant stress at home due to caring for her  with Parkinson's disease     PAF (paroxysmal atrial fibrillation) ([facility] [address] [address] )  Assessment & Plan  · Patient with known history of paroxysmal AFib recently admitted to this hospital and discharged on January 5th  During that admission she was seen by Cardiology and placed on regimen including sotalol 120 mg twice a day, metoprolol 25 mg twice a day, and Eliquis 5 mg twice a day  · Patient is currently in sinus bradycardia with normal QTC  · Case was reviewed with Cardiology  An appointment has already been made with EP specialist but they will attempt expedited and will also plan to arrange Zio patch for home heart rhythm monitoring     Stress due to illness of family member  [address] [place] [address]  · Spoke to patient in some detail regarding significant stress she is experiencing at home in caring for her     Encouraged her to seek resources including counseling to help cope and allow friends and neighbors to assist with chores and groceries to shift some of her other burden        Essential hypertension  Assessment & Plan  · Blood pressure elevated on admission at 190/88 but noted to be improved without intervention down to 140/63  · Continue Cozaar and Lopressor  Discharging Physician / Practitioner: Lisa Grey PA-C  PCP: Logan Warren MD  Admission Date:   Admission Orders (From admission, onward)     Ordered        01/08/21 1202  Place in Observation  Once                   Discharge Date: 01/18/21    Resolved Problems  Date Reviewed: 1/12/2021    None          Consultations During Hospital Stay:  · None    Procedures Performed:   · Chest x-ray no active disease    Significant Findings / Test Results:   · None    Incidental Findings:   · None     Test Results Pending at Discharge (will require follow up): · None     Outpatient Tests Requested:  · Cardiology will send patient a Zio patch    Complications:  None    Reason for Admission:  Chest tightness    Hospital Course:     Inder Dc is a 67 y o  female patient who originally presented to the hospital on 1/8/2021 due to brief episode of chest tightness/pressure at home associated with sense of heart racing  Patient had just been admitted to the hospital from January 3rd through January 5th for rapid AFib  However on this admission when she came back she was noted to be still in sinus bradycardia  She was stable on her metoprolol, sotalol and Eliquis  She had serial troponin monitoring which was negative  She already has outpatient appointment scheduled with electrophysiology and will be set up with a Zio patch  In discussion with the patient it appears that she is under significant stress at home in caring for her  which was likely causing her chest pain  Please see above list of diagnoses and related plan for additional information       Condition at Discharge: stable     Discharge Day Visit / Exam:     Physical Exam   Constitutional: No acute distress, non-toxic in appearance  CV: RRR, no M/R/G  Pulm: CTAB    Discussion with Family: patient has updated daughter    Discharge instructions/Information to patient and family:   See after visit summary for information provided to patient and family  Provisions for Follow-Up Care:  See after visit summary for information related to follow-up care and any pertinent home health orders  Disposition:     Home    Planned Readmission: none     Discharge Statement:  I spent 30 minutes discharging the patient  This time was spent on the day of discharge  I had direct contact with the patient on the day of discharge  Greater than 50% of the total time was spent examining patient, answering all patient questions, arranging and discussing plan of care with patient as well as directly providing post-discharge instructions  Additional time then spent on discharge activities  Discharge Medications:  See after visit summary for reconciled discharge medications provided to patient and family        ** Please Note: This note has been constructed using a voice recognition system **

## 2021-01-19 ENCOUNTER — LAB (OUTPATIENT)
Dept: LAB | Facility: CLINIC | Age: 76
End: 2021-01-19
Payer: MEDICARE

## 2021-01-19 DIAGNOSIS — E04.1 THYROID NODULE: ICD-10-CM

## 2021-01-19 LAB
CALCIUM SERPL-MCNC: 9.5 MG/DL (ref 8.3–10.1)
TSH SERPL DL<=0.05 MIU/L-ACNC: 1.71 UIU/ML (ref 0.36–3.74)

## 2021-01-19 PROCEDURE — 84443 ASSAY THYROID STIM HORMONE: CPT

## 2021-01-19 PROCEDURE — 36415 COLL VENOUS BLD VENIPUNCTURE: CPT

## 2021-01-19 PROCEDURE — 82310 ASSAY OF CALCIUM: CPT

## 2021-01-22 ENCOUNTER — CLINICAL SUPPORT (OUTPATIENT)
Dept: CARDIOLOGY CLINIC | Facility: CLINIC | Age: 76
End: 2021-01-22
Payer: MEDICARE

## 2021-01-22 DIAGNOSIS — I48.0 PAROXYSMAL ATRIAL FIBRILLATION (HCC): ICD-10-CM

## 2021-01-22 PROCEDURE — 93246 EXT ECG>7D<15D RECORDING: CPT | Performed by: INTERNAL MEDICINE

## 2021-01-22 NOTE — PROGRESS NOTES
Nuvia Grissom is here today under the direction of Dr Keke Ivy  Patch applied and all instructions for use given to patient in office

## 2021-01-27 ENCOUNTER — IMMUNIZATIONS (OUTPATIENT)
Dept: FAMILY MEDICINE CLINIC | Facility: HOSPITAL | Age: 76
End: 2021-01-27

## 2021-01-27 DIAGNOSIS — Z23 ENCOUNTER FOR IMMUNIZATION: Primary | ICD-10-CM

## 2021-01-27 PROCEDURE — 0011A SARS-COV-2 / COVID-19 MRNA VACCINE (MODERNA) 100 MCG: CPT | Performed by: INTERNAL MEDICINE

## 2021-01-27 PROCEDURE — 91301 SARS-COV-2 / COVID-19 MRNA VACCINE (MODERNA) 100 MCG: CPT | Performed by: INTERNAL MEDICINE

## 2021-02-01 ENCOUNTER — TELEPHONE (OUTPATIENT)
Dept: PAIN MEDICINE | Facility: CLINIC | Age: 76
End: 2021-02-01

## 2021-02-01 DIAGNOSIS — M47.816 LUMBAR SPONDYLOSIS: ICD-10-CM

## 2021-02-01 DIAGNOSIS — M79.7 FIBROMYALGIA: ICD-10-CM

## 2021-02-01 RX ORDER — GABAPENTIN 300 MG/1
CAPSULE ORAL
Qty: 180 CAPSULE | Refills: 0 | Status: SHIPPED | OUTPATIENT
Start: 2021-02-01 | End: 2021-03-25

## 2021-02-01 NOTE — TELEPHONE ENCOUNTER
Pt contacted Call Center requested refill of their medication  Medication Name:gabapentin (NEURONTIN)         Dosage of Med:300 mg capsule       Frequency of Med: Take 2 caps by mouth at bedtime      Remaining Medication: 2 weeks       Pharmacy and Location:  [address] [place] [address] [id number] [id number] [phone]      Pt  Preferred Callback Phone Number: [phone]      Thank you

## 2021-02-01 NOTE — TELEPHONE ENCOUNTER
Pt reports the gabapentin 300 mg (2)m at [facility] is working and causing no s/e  Pls send a 90 day script to her [facility] mail order pharmacy  Pt has a 2 wk supply left  Looks like pt was just seen on 1/12/21 and a 1 month Rx was sent to retail

## 2021-02-02 DIAGNOSIS — M79.7 FIBROMYALGIA: ICD-10-CM

## 2021-02-02 DIAGNOSIS — M47.816 LUMBAR SPONDYLOSIS: ICD-10-CM

## 2021-02-02 RX ORDER — GABAPENTIN 300 MG/1
CAPSULE ORAL
Qty: 180 CAPSULE | Refills: 3 | OUTPATIENT
Start: 2021-02-02

## 2021-02-04 ENCOUNTER — CONSULT (OUTPATIENT)
Dept: CARDIOLOGY CLINIC | Facility: CLINIC | Age: 76
End: 2021-02-04
Payer: MEDICARE

## 2021-02-04 VITALS
BODY MASS INDEX: 28.49 KG/M2 | SYSTOLIC BLOOD PRESSURE: 144 MMHG | DIASTOLIC BLOOD PRESSURE: 70 MMHG | OXYGEN SATURATION: 98 % | WEIGHT: 154.8 LBS | HEART RATE: 49 BPM | HEIGHT: 62 IN

## 2021-02-04 DIAGNOSIS — I10 ESSENTIAL HYPERTENSION: ICD-10-CM

## 2021-02-04 DIAGNOSIS — I48.0 PAROXYSMAL ATRIAL FIBRILLATION (HCC): Primary | ICD-10-CM

## 2021-02-04 DIAGNOSIS — Z63.79 STRESS DUE TO ILLNESS OF FAMILY MEMBER: ICD-10-CM

## 2021-02-04 DIAGNOSIS — Z79.01 CURRENT USE OF LONG TERM ANTICOAGULATION: ICD-10-CM

## 2021-02-04 DIAGNOSIS — E78.2 MIXED HYPERLIPIDEMIA: ICD-10-CM

## 2021-02-04 DIAGNOSIS — C73 MALIGNANT NEOPLASM OF THYROID GLAND (HCC): ICD-10-CM

## 2021-02-04 PROCEDURE — 99215 OFFICE O/P EST HI 40 MIN: CPT | Performed by: INTERNAL MEDICINE

## 2021-02-04 PROCEDURE — 93000 ELECTROCARDIOGRAM COMPLETE: CPT | Performed by: INTERNAL MEDICINE

## 2021-02-04 NOTE — LETTER
February 4, 2021     [employer] [address] [place] [address]    Patient: Margaret Bailey   YOB: 1945   Date of Visit: 2/4/2021       Dear Dr Con Quintanilla:    Thank you for referring Sheri Baker to me for evaluation  Below are my notes for this consultation  If you have questions, please do not hesitate to call me  I look forward to following your patient along with you  Sincerely,        Darvin Moran MD        CC: DO Elizabeth Geiger MA  [facility]  Darvin Moran MD  2/4/2021 10:18 PM  Sign when [facility] Visit                                             Cardiology Consultation     Margaret Bailey  [id number]  1945  [address] [facility] [address]    1  Paroxysmal atrial fibrillation (HCC)  POCT ECG   2  Malignant neoplasm of thyroid gland ([address] )     3  Essential hypertension     4  Mixed hyperlipidemia     5  Stress due to illness of family member     10  Current use of long term anticoagulation              history of present illness    The patient was admitted to the hospital on January 4 and thereafter January 9th with palpitation   she was found to have  atrial fibrillation and RVR  This resulted in worsening dyspnea  This is happening despite her being on sotalol      prior medical history  The patient is a pleasant 59-year-old lady, with a history of A fib    This has been present for at least 6 years, but there has been a recent increase in frequency and duration  Was started on sotalol 80 bid about 3 years ago     Predominant symptom is palpitation and occasional shortness of breath and chest pain  There has been no episode of presyncope or syncope  She does not complain of orthopnea or paroxysmal nocturnal dyspnea  Patient does not have any leg swelling     she does have a history of snoring, morning fatigability and daytime sleepiness  However sleep study was negative for significant AKIN     The patient does have a history of knee replacement and has significant arthritis of her other knee     She has minimal episodes after being on higher dose of sotalol     She takes care of her  and cannot stay too long in hospital      /70 (BP Location: Left arm, Patient Position: Sitting, Cuff Size: Large)   Pulse (!) 49   Ht 5' 2" (1 575 m)   Wt 70 2 kg (154 lb 12 8 oz)   LMP 02/01/1990 (Within Weeks)   SpO2 98%   BMI 28 31 kg/m²       Patient Active Problem List   Diagnosis    Essential hypertension    Allergic rhinitis    Fibromyalgia    Hyperlipidemia    Insomnia    Lumbar spondylosis    Lumbar stenosis    Osteoarthrosis, hand    Osteoarthritis of knee    Osteopenia    Paroxysmal atrial fibrillation (HCC)    Peripheral neuropathy    Restless legs syndrome    TMJ syndrome    Vitamin D deficiency    Osteoarthritis of shoulder    Carpal tunnel syndrome on right    Arthritis of both acromioclavicular joints    Chronic rhinitis    Chronic diastolic CHF (congestive heart failure) (HCC)    Chest pressure    Stress due to illness of family member    Malignant ne[facility] gland [facility]    Current use of long term anticoagulation     Past Medical History:   Diagnosis Date    Actinic keratosis     last assessed - [id number]    Arthritis     Atrial fibrillation with rapid ventricular response ([address] )     last assessed - 26Apr2016    Basal cell carcinoma     Benign colon polyp     last assessed - [id number]    Disease of thyroid gland     Effusion of knee joint right     Resolved - [id number]    Esophageal reflux     Fibromyalgia     last assessed - [id number]    Fibromyalgia, primary     GERD (gastroesophageal reflux disease)     Hypertension     Palpitations     last assessed - 30Apr2013    Peroneal tendonitis, right     last assessed - [id number]    Right lumbar radiculopathy 3/17/2016    Thyroid nodule     Trochanteric bursitis of left hip 3/9/2018     Social History     Socioeconomic History    Marital status: /Civil Union     Spouse name: Not on file    Number of children: Not on file    Years of education: Not on file    Highest education level: Not on file   Occupational History    Not on file   Social Needs    Financial resource strain: Not on file    Food insecurity     Worry: Not on file     Inability: Not on file   [employer] needs     Medical: Not on file     Non-medical: Not on file   Tobacco Use    Smoking status: Never Smoker    Smokeless tobacco: Never Used   Substance and Sexual Activity    Alcohol use: Not Currently     Drinks per session: 1 or 2     Comment: occosional - every 3 months    Drug use: Never    Sexual activity: Not Currently   Lifestyle    Physical activity     Days per week: Not on file     Minutes per session: Not on file    Stress: Not on file   Relationships    Social connections     Talks on phone: Not on file     Gets together: Not on file     Attends Restorationist service: Not on file     Active member of club or organization: Not on file     Attends meetings of clubs or organizations: Not on file     Relationship status: Not on file    Intimate partner violence     Fear of current or ex partner: Not on file     Emotionally abused: Not on file     Physically abused: Not on file     Forced sexual activity: Not on file   Other Topics Concern    Not on file   Social History Narrative    Not on file      Family History   Problem Relation Age of Onset    Heart disease Mother     Diabetes Mother     Heart disease Father     Coronary artery disease Father     Stroke Father         cerebrovascular accident    Heart attack Father         myocardial infarction   Laren Lat Sudden death Father         scd    Other Family         Back disorder    Coronary artery disease Family     Neuropathy Family     Osteoporosis Family     No Known Problems Daughter  No Known Problems Maternal Grandmother     No Known Problems Maternal Grandfather     No Known Problems Paternal Grandmother     No Known Problems Paternal Grandfather     Cancer Maternal Uncle     Breast cancer Maternal Aunt 72    No Known Problems Son     No Known Problems Maternal Aunt     No Known Problems Maternal Aunt     No Known Problems Maternal Aunt     No Known Problems Paternal Aunt     No Known Problems Paternal Aunt     Anuerysm Neg Hx     Clotting disorder Neg Hx     Arrhythmia Neg Hx     Heart failure Neg Hx      Past Surgical History:   Procedure Laterality Date    CATARACT EXTRACTION Bilateral     COLONOSCOPY  03/2018    EYE SURGERY      HYSTERECTOMY      JOINT REPLACEMENT Left     knee    CT REVISE MEDIAN N/CARPAL TUNNEL SURG Right 11/14/2019    Procedure: RELEASE CARPAL TUNNEL;  Surgeon: Oneida Alarcon MD;  [facility];  Service: Orthopedics    CT THYROID LOBECTOMY,UNILAT Left 12/16/2020    Procedure: Left THYROID LOBECTOMY;  Surgeon: Sherlean Nissen, MD;  [facility];  Service: ENT    RECTAL POLYPECTOMY      REPLACEMENT TOTAL KNEE Left     last assessed - 27Apr2015    TONSILLECTOMY      TOTAL ABDOMINAL HYSTERECTOMY      TUBAL LIGATION      US GUIDED THYROID BIOPSY  10/14/2020       Current Outpatient Medications:     acetaminophen (TYLENOL) 650 mg CR tablet, Take 1 tablet (650 mg total) by mouth every 6 (six) hours as needed for mild pain or moderate pain, Disp: 30 tablet, Rfl: 0    apixaban (ELIQUIS) 5 mg, Take 1 tablet (5 mg total) by mouth 2 (two) times a day, Disp: 180 tablet, Rfl: 0    ascorbic acid (VITAMIN C) 500 mg tablet, Take 500 mg by mouth daily , Disp: , Rfl:     Cholecalciferol ([employer] VITAMIN D) 2000 UNITS CAPS, Take 2,000 Units by mouth 2 (two) times a day  , Disp: , Rfl:     Chromium Picolinate (CHROMIUM PICOLATE PO), Take 1 tablet by mouth daily, Disp: , Rfl:     escitalopram (LEXAPRO) 5 mg tablet, Take 1 tablet (5 mg total) by mouth daily, Disp: 30 tablet, Rfl: 2    ezetimibe (ZETIA) 10 mg tablet, TAKE 1 TABLET BY MOUTH  DAILY, Disp: 90 tablet, Rfl: 3    famotidine (PEPCID) 20 mg tablet, Take 20 mg by mouth daily  , Disp: , Rfl:     gabapentin (NEURONTIN) 300 mg capsule, Take 2 caps by mouth at bedtime, Disp: 180 capsule, Rfl: 0    losartan (COZAAR) 50 mg tablet, Take 1 tablet (50 mg total) by mouth daily, Disp: 90 tablet, Rfl: 0    metoprolol tartrate (LOPRESSOR) 25 mg tablet, Take 1 tablet (25 mg total) by mouth every 12 (twelve) hours, Disp: 180 tablet, Rfl: 0    rOPINIRole (REQUIP) 0 25 mg tablet, TAKE 1 TABLET BY MOUTH  DAILY AT BEDTIME, Disp: 90 tablet, Rfl: 3    sotalol (BETAPACE) 120 mg tablet, Take 1 tablet (120 mg total) by mouth every 12 (twelve) hours, Disp: 180 tablet, Rfl: 3    traMADol (ULTRAM) 50 mg tablet, TAKE 1 TABLET BY MOUTH  TWICE DAILY AS NEEDED FOR  MODERATE PAIN, Disp: 180 tablet, Rfl: 1    TURMERIC PO, Take 1 capsule by mouth 2 (two) times a day, Disp: , Rfl:     zolpidem (AMBIEN) 10 mg tablet, Take 1 tablet (10 mg total) by mouth daily at bedtime as needed for sleep, Disp: 90 tablet, Rfl: 1  Allergies   Allergen Reactions    Penicillins Other (See Comments)     As a child calcium deposit in the arm     Ace Inhibitors GI Intolerance     Did feel well on it     Vitals:    02/04/21 1542   BP: 144/70   BP Location: Left arm   Patient Position: Sitting   Cuff Size: Large   Pulse: (!) 49   SpO2: 98%   Weight: 70 2 kg (154 lb 12 8 oz)   Height: 5' 2" (1 575 m)       Labs:  Lab Results   Component Value Date     05/06/2015    K 4 0 01/08/2021    K 4 8 05/06/2015     01/08/2021    CL 99 (L) 05/06/2015    CO2 28 01/08/2021    CO2 28 05/06/2015    BUN 22 01/08/2021    BUN 19 05/06/2015    CREATININE 0 92 01/08/2021    CREATININE 0 97 05/06/2015    GLUCOSE 114 05/06/2015    CALCIUM 9 5 01/19/2021    CALCIUM 9 0 05/06/2015     Lab Results   Component Value Date    CKTOTAL 100 07/10/2014    TROPONINI <0 02 01/08/2021     Lab Results   Component Value Date    WBC 5 52 01/08/2021    WBC 6 63 05/06/2015    HGB 12 5 01/08/2021    HGB 13 3 05/06/2015    HCT 38 4 01/08/2021    HCT 39 3 05/06/2015     (H) 01/08/2021    MCV 93 05/06/2015     01/08/2021     05/06/2015     Lab Results   Component Value Date    CHOL 205 05/06/2015    TRIG 121 08/03/2020    TRIG 90 05/06/2015    HDL 54 08/03/2020    HDL 53 05/06/2015     Imaging:     ambulatory event monitor   October 2020            echocardiogram   January 2021  LEFT VENTRICLE:  Systolic function was normal  Ejection fraction was estimated to be 55 %  There were no regional wall motion abnormalities  Wall thickness was at the upper limits of normal   Doppler parameters were consistent with elevated ventricular end-diastolic filling pressure      LEFT ATRIUM:  The atrium was mildly to moderately dilated      RIGHT ATRIUM:  The atrium was mildly dilated      MITRAL VALVE:  There was mild stenosis      TRICUSPID VALVE:  There was mild to moderate regurgitation  Pulmonary artery systolic pressure was mildly increased  Xr Chest 1 View Portable  Result Date: 1/8/2021  Narrative: CHEST INDICATION: Chest pain COMPARISON:  1/3/2021 chest x-ray EXAM PERFORMED/VIEWS:  XR CHEST PORTABLE Single view FINDINGS: Cardiomediastinal silhouette appears unremarkable  The lungs are clear  No pneumothorax or pleural effusion  Osseous structures appear within normal limits for patient age  Impression: No acute cardiopulmonary disease  Findings are stable Workstation performed: [id number]       Review of Systems:  Review of Systems   All other systems reviewed and are negative  as described in my history of present illness        Physical Exam:  Physical Exam  Vitals signs reviewed  Constitutional:       General: She is not in acute distress  Appearance: Normal appearance  She is well-developed and normal weight   She is not ill-appearing, toxic-appearing or diaphoretic  Comments: Not in any distress at the current time   HENT:      Head: Normocephalic and atraumatic  Right Ear: External ear normal       Left Ear: External ear normal       Nose: Nose normal  No congestion or rhinorrhea  Mouth/Throat:      Pharynx: Uvula midline  Comments: Posterior pharynx is crowded  Eyes:      General: Lids are normal  No scleral icterus  Extraocular Movements: Extraocular movements intact  Conjunctiva/sclera: Conjunctivae normal       Pupils: Pupils are equal, round, and reactive to light  Comments: No pallor  No cyanosis  No icterus   Neck:      Musculoskeletal: Normal range of motion and neck supple  No neck rigidity or muscular tenderness  Thyroid: No thyromegaly  Vascular: No carotid bruit or JVD  Trachea: Trachea normal       Comments: No jugular lymphadenopathy  Recently underwent thyroid surgery  Cardiovascular:      Rate and Rhythm: Regular rhythm  Bradycardia present  Chest Wall: PMI is not displaced  Pulses: Normal pulses  Heart sounds: Normal heart sounds, S1 normal and S2 normal  No murmur  No friction rub  No gallop  No S3 or S4 sounds  Pulmonary:      Effort: Pulmonary effort is normal  No accessory muscle usage or respiratory distress  Breath sounds: Normal breath sounds  No decreased breath sounds, wheezing, rhonchi or rales  Chest:      Chest wall: No tenderness  Abdominal:      General: Abdomen is flat  Bowel sounds are normal  There is no distension  Palpations: Abdomen is soft  There is no hepatomegaly, splenomegaly or mass  Tenderness: There is no abdominal tenderness  Musculoskeletal: Normal range of motion  General: No swelling, tenderness or deformity  Lymphadenopathy:      Cervical: No cervical adenopathy  Skin:     Coloration: Skin is not jaundiced or pale  Findings: No abrasion, bruising, erythema, lesion or rash  Nails:  There is no clubbing  Neurological:      Mental Status: She is alert and oriented to person, place, and time  Mental status is at baseline  Comments: Facial symmetry is retained  Extraocular movements are retained  Head neck tongue and palate movement are retained and symmetric   Psychiatric:         Mood and Affect: Mood normal          Speech: Speech normal          Behavior: Behavior normal          Thought Content:  Thought content normal          Discussion/Summary:     1  Paroxysmal atrial fibrillation  Patient was originally seen in June 2016  Was started on sotalol at that time   Did well since then with only 2 major episodes    Recently admitted twice in Jan 2021 with RVR and diastolic heart failure     We had a very detailed discussion as far as management of atrial fibrillation   my detailed recommendation for this patient are as follows         1 - natural history of disease   atrial fibrillation is a disease of age   prevalence is 1% in the 4th decade , 2-3% by age 72 and 12-13% by age 80   since it increases with age , definitive therapy is indicated         2 - sleep apnea   untreated sleep apnea is the common cause of failure of medication as well as ablation   success rate of paroxysmal atrial fibrillation ablation falls from 80% in the 1st year, to 15% in the 1st year, for untreated severe sleep apnea   the patient has a history of snoring, morning fatigue at times and daytime sleepiness at times  physical examination for short thick neck and crowded posterior pharynx is -positive  patient is recommended to follow up with Pulmonary and Sleep Medicine - for AKIN   No significant sleep apnea in 2016        3 - thyroid function   hyperthyroidism is a common precipitator of atrial fibrillation   TSH - 1 759 in Sept 2018  Recently underwent therapy for thyroid cancer         4 -Aggressive management of  hypertension - on amlodepin  diabetes mellitus   heart failure - LVEF =60%        5 - anticoagulation patient's chads Vasc score is -3  hypertension   age   gender      Is on apixaban     6 - rate control medication   beta-blocker effect of sotalol           7 - rhythm control medication    class 3 agent - Sotalol and dofetilide   both will need hospital admission to monitor first 5 doses over the initial 2 and half days     sotalol has intrinsic beta-blocker properties   dofetilide may need an additional beta-blocker along with it for rate control  Keep potassium between 4 and 4 5   keep magnesium between 2 and 2 5   follow QTC   Follow creatinine   Patient is on sotalol       8 - ablation   this is the most effective method to achieve and maintain sinus rhythm      paroxysmal atrial fibrillation - 70-80% chance in the 1st year  and falls to 50% by 5 years   persistent atrial fibrillation - 50-60% chance in the 1st year and falls to 30% or less by 5 years      we use a combination of cryo and radiofrequency ablation      there is a 2-5% chance of serious complication which include - bleeding around access site, heart attack , stroke , bleeding around the heart requiring drainage , perforation requiring open heart surgery , phrenic nerve paralysis causing diaphragmatic paralysis, atrial esophageal fistula   we do deployed multiple methods to prevent such complication :  - heparin anticoagulation with ACT between 350 and 400s to prevent stroke   - coronary angiography if we are going to ablate close to any major blood vessel   -access to the pericardial drain if pericardial effusion were to happen   - pressure sensing catheters to reduce excessive pressure and chances of perforation   - esophageal temperature monitoring to reduce chances of injury       proceed with ablation     Summary of recommendation:  CTPV, SHANNAN before procedure , hold eliquis before procedure  For 36 hrs  Proceed with ablation              2   Hypertension  Currently reasonably controlled   Losartan, metoprolol  144/70  Need to titrate meds 3  Thyroid cancer   Recently underwent surgery   Check TSH        4  Hyperlipidemia   On zetia       5   Long term anticoagulation  CHADSVASc -3  On apixaban            summary of my recommendation for patient   CT pulmonary vein   SHANNAN before the procedure   Hold Eliquis the day before and morning of procedure  Cryo NavX

## 2021-02-04 NOTE — PROGRESS NOTES
Cardiology Consultation     Adolph Bojorquez  [id number]  1945  [address] [facility] [id number]    1  Paroxysmal atrial fibrillation (HCC)  POCT ECG   2  Malignant neoplasm of thyroid gland ([address] )     3  Essential hypertension     4  Mixed hyperlipidemia     5  Stress due to illness of family member     10  Current use of long term anticoagulation              history of present illness    The patient was admitted to the hospital on January 4 and thereafter January 9th with palpitation   she was found to have  atrial fibrillation and RVR  This resulted in worsening dyspnea  This is happening despite her being on sotalol      prior medical history  The patient is a pleasant 69-year-old lady, with a history of A fib    This has been present for at least 6 years, but there has been a recent increase in frequency and duration  Was started on sotalol 80 bid about 3 years ago     Predominant symptom is palpitation and occasional shortness of breath and chest pain  There has been no episode of presyncope or syncope  She does not complain of orthopnea or paroxysmal nocturnal dyspnea  Patient does not have any leg swelling     she does have a history of snoring, morning fatigability and daytime sleepiness  However sleep study was negative for significant AKIN     The patient does have a history of knee replacement and has significant arthritis of her other knee     She has minimal episodes after being on higher dose of sotalol     She takes care of her  and cannot stay too long in hospital      /70 (BP Location: Left arm, Patient Position: Sitting, Cuff Size: Large)   Pulse (!) 49   Ht 5' 2" (1 575 m)   Wt 70 2 kg (154 lb 12 8 oz)   LMP 02/01/1990 (Within Weeks)   SpO2 98%   BMI 28 31 kg/m²       Patient Active Problem List   Diagnosis    Essential hypertension    Allergic rhinitis    Fibromyalgia    Hyperlipidemia    Insomnia    Lumbar spondylosis    Lumbar stenosis    Osteoarthrosis, hand    Osteoarthritis of knee    Osteopenia    Paroxysmal atrial fibrillation (HCC)    Peripheral neuropathy    Restless legs syndrome    TMJ syndrome    Vitamin D deficiency    Osteoarthritis of shoulder    Carpal tunnel syndrome on right    Arthritis of both acromioclavicular joints    Chronic rhinitis    Chronic diastolic CHF (congestive heart failure) ([facility])    Chest pressure    Stress due to illness of family member    Malignant neoplasm of thyroid gland ([address] )    Current use of long term anticoagulation     Past Medical History:   Diagnosis Date    Actinic keratosis     last assessed - [id number]    Arthritis     Atrial fibrillation with rapid ventricular response ([facility])     last assessed - [id number]    Basal cell carcinoma     Benign colon polyp     last assessed - [id number]    Disease of thyroid gland     Effusion of knee joint right     Resolved - [id number]    Esophageal reflux     Fibromyalgia     last assessed - [id number]    Fibromyalgia, primary     GERD (gastroesophageal reflux disease)     Hypertension     Palpitations     last assessed - [id number]    Peroneal tendonitis, right     last assessed - [id number]    Right lumbar radiculopathy 3/17/2016    Thyroid nodule     Trochanteric bursitis of left hip 3/9/2018     Social History     Socioeconomic History    Marital status: /Civil Union     Spouse name: Not on file    Number of children: Not on file    Years of education: Not on file    Highest education level: Not on file   Occupational History    Not on file   Social Needs    Financial resource strain: Not on file    Food insecurity     Worry: Not on file     Inability: Not on file    Transportation needs     Medical: Not on file     Non-medical: Not on file   Tobacco Use    Smoking status: Never Smoker    Smokeless tobacco: Never Used   Substance and Sexual Activity    Alcohol use: Not Currently     Drinks per session: 1 or 2     Comment: occosional - every 3 months    Drug use: Never    Sexual activity: Not Currently   Lifestyle    Physical activity     Days per week: Not on file     Minutes per session: Not on file    Stress: Not on file   Relationships    Social connections     Talks on phone: Not on file     Gets together: Not on file     Attends Mosque service: Not on file     Active member of club or organization: Not on file     Attends meetings of clubs or organizations: Not on file     Relationship status: Not on file    Intimate partner violence     Fear of current or ex partner: Not on file     Emotionally abused: Not on file     Physically abused: Not on file     Forced sexual activity: Not on file   Other Topics Concern    Not on file   Social History Narrative    Not on file      Family History   Problem Relation Age of Onset    Heart disease Mother     Diabetes Mother     Heart disease Father     Coronary artery disease Father     Stroke Father         cerebrovascular accident    Heart attack Father         myocardial infarction    Sudden death Father         scd    Other Family         Back disorder    Coronary artery disease Family     Neuropathy Family     Osteoporosis Family     No Known Problems Daughter     No Known Problems Maternal Grandmother     No Known Problems Maternal Grandfather     No Known Problems Paternal Grandmother     No Known Problems Paternal Grandfather     Cancer Maternal Uncle     Breast cancer Maternal Aunt 72    No Known Problems Son     No Known Problems Maternal Aunt     No Known Problems Maternal Aunt     No Known Problems Maternal Aunt     No Known Problems Paternal Aunt     No Known Problems Paternal Aunt     Anuerysm Neg Hx     Clotting disorder Neg Hx     Arrhythmia Neg Hx     Heart failure Neg Hx      Past Surgical History:   Procedure Laterality Date    CATARACT EXTRACTION Bilateral     COLONOSCOPY  03/2018    EYE SURGERY      HYSTERECTOMY      JOINT REPLACEMENT Left     knee    CA REVISE MEDIAN N/CARPAL TUNNEL SURG Right 11/14/2019    Procedure: RELEASE CARPAL TUNNEL;  Surgeon: Nayan Ngueyn MD;  [facility];  Service: Orthopedics    CA THYROID LOBECTOMY,UNILAT Left 12/16/2020    Procedure: Left THYROID LOBECTOMY;  Surgeon: Ashley Mendoza MD;  [facility];  Service: ENT    RECTAL POLYPECTOMY      REPLACEMENT TOTAL KNEE Left     last assessed - 27Apr2015    TONSILLECTOMY      TOTAL ABDOMINAL HYSTERECTOMY      TUBAL LIGATION      US GUIDED THYROID BIOPSY  10/14/2020       Current Outpatient Medications:     acetaminophen (TYLENOL) 650 mg CR tablet, Take 1 tablet (650 mg total) by mouth every 6 (six) hours as needed for mild pain or moderate pain, Disp: 30 tablet, Rfl: 0    apixaban (ELIQUIS) 5 mg, Take 1 tablet (5 mg total) by mouth 2 (two) times a day, Disp: 180 tablet, Rfl: 0    ascorbic acid (VITAMIN C) 500 mg tablet, Take 500 mg by mouth daily , Disp: , Rfl:     Cholecalciferol ([employer] VITAMIN D) 2000 UNITS CAPS, Take 2,000 Units by mouth 2 (two) times a day  , Disp: , Rfl:     Chromium Picolinate (CHROMIUM PICOLATE PO), Take 1 tablet by mouth daily, Disp: , Rfl:     escitalopram (LEXAPRO) 5 mg tablet, Take 1 tablet (5 mg total) by mouth daily, Disp: 30 tablet, Rfl: 2    ezetimibe (ZETIA) 10 mg tablet, TAKE 1 TABLET BY MOUTH  DAILY, Disp: 90 tablet, Rfl: 3    famotidine (PEPCID) 20 mg tablet, Take 20 mg by mouth daily  , Disp: , Rfl:     gabapentin (NEURONTIN) 300 mg capsule, Take 2 caps by mouth at bedtime, Disp: 180 capsule, Rfl: 0    losartan (COZAAR) 50 mg tablet, Take 1 tablet (50 mg total) by mouth daily, Disp: 90 tablet, Rfl: 0    metoprolol tartrate (LOPRESSOR) 25 mg tablet, Take 1 tablet (25 mg total) by mouth every 12 (twelve) hours, Disp: 180 tablet, Rfl: 0    rOPINIRole (REQUIP) 0 25 mg tablet, TAKE 1 TABLET BY MOUTH DAILY AT BEDTIME, Disp: 90 tablet, Rfl: 3    sotalol (BETAPACE) 120 mg tablet, Take 1 tablet (120 mg total) by mouth every 12 (twelve) hours, Disp: 180 tablet, Rfl: 3    traMADol (ULTRAM) 50 mg tablet, TAKE 1 TABLET BY MOUTH  TWICE DAILY AS NEEDED FOR  MODERATE PAIN, Disp: 180 tablet, Rfl: 1    TURMERIC PO, Take 1 capsule by mouth 2 (two) times a day, Disp: , Rfl:     zolpidem (AMBIEN) 10 mg tablet, Take 1 tablet (10 mg total) by mouth daily at bedtime as needed for sleep, Disp: 90 tablet, Rfl: 1  Allergies   Allergen Reactions    Penicillins Other (See Comments)     As a child calcium deposit in the arm     Ace Inhibitors GI Intolerance     Did feel well on it     Vitals:    02/04/21 1542   BP: 144/70   BP Location: Left arm   Patient Position: Sitting   Cuff Size: Large   Pulse: (!) 49   SpO2: 98%   Weight: 70 2 kg (154 lb 12 8 oz)   Height: 5' 2" (1 575 m)       Labs:  Lab Results   Component Value Date     05/06/2015    K 4 0 01/08/2021    K 4 8 05/06/2015     01/08/2021    CL 99 (L) 05/06/2015    CO2 28 01/08/2021    CO2 28 05/06/2015    BUN 22 01/08/2021    BUN 19 05/06/2015    CREATININE 0 92 01/08/2021    CREATININE 0 97 05/06/2015    GLUCOSE 114 05/06/2015    CALCIUM 9 5 01/19/2021    CALCIUM 9 0 05/06/2015     Lab Results   Component Value Date    CKTOTAL 100 07/10/2014    TROPONINI <0 02 01/08/2021     Lab Results   Component Value Date    WBC 5 52 01/08/2021    WBC 6 63 05/06/2015    HGB 12 5 01/08/2021    HGB 13 3 05/06/2015    HCT 38 4 01/08/2021    HCT 39 3 05/06/2015     (H) 01/08/2021    MCV 93 05/06/2015     01/08/2021     05/06/2015     Lab Results   Component Value Date    CHOL 205 05/06/2015    TRIG 121 08/03/2020    TRIG 90 05/06/2015    HDL 54 08/03/2020    HDL 53 05/06/2015     Imaging:     ambulatory event monitor   October 2020            echocardiogram   January 2021  LEFT VENTRICLE:  Systolic function was normal  Ejection fraction was estimated to be 55 %   There were no regional wall motion abnormalities  Wall thickness was at the upper limits of normal   Doppler parameters were consistent with elevated ventricular end-diastolic filling pressure      LEFT ATRIUM:  The atrium was mildly to moderately dilated      RIGHT ATRIUM:  The atrium was mildly dilated      MITRAL VALVE:  There was mild stenosis      TRICUSPID VALVE:  There was mild to moderate regurgitation  Pulmonary artery systolic pressure was mildly increased  Xr Chest 1 View Portable  Result Date: 1/8/2021  Narrative: CHEST INDICATION: Chest pain COMPARISON:  1/3/2021 chest x-ray EXAM PERFORMED/VIEWS:  XR CHEST PORTABLE Single view FINDINGS: Cardiomediastinal silhouette appears unremarkable  The lungs are clear  No pneumothorax or pleural effusion  Osseous structures appear within normal limits for patient age  Impression: No acute cardiopulmonary disease  Findings are stable Workstation performed: [id number]       Review of Systems:  Review of Systems   All other systems reviewed and are negative  as described in my history of present illness        Physical Exam:  Physical Exam  Vitals signs reviewed  Constitutional:       General: She is not in acute distress  Appearance: Normal appearance  She is well-developed and normal weight  She is not ill-appearing, toxic-appearing or diaphoretic  Comments: Not in any distress at the current time   HENT:      Head: Normocephalic and atraumatic  Right Ear: External ear normal       Left Ear: External ear normal       Nose: Nose normal  No congestion or rhinorrhea  Mouth/Throat:      Pharynx: Uvula midline  Comments: Posterior pharynx is crowded  Eyes:      General: Lids are normal  No scleral icterus  Extraocular Movements: Extraocular movements intact  Conjunctiva/sclera: Conjunctivae normal       Pupils: Pupils are equal, round, and reactive to light        Comments: No pallor  No cyanosis  No icterus Neck:      Musculoskeletal: Normal range of motion and neck supple  No neck rigidity or muscular tenderness  Thyroid: No thyromegaly  Vascular: No carotid bruit or JVD  Trachea: Trachea normal       Comments: No jugular lymphadenopathy  Recently underwent thyroid surgery  Cardiovascular:      Rate and Rhythm: Regular rhythm  Bradycardia present  Chest Wall: PMI is not displaced  Pulses: Normal pulses  Heart sounds: Normal heart sounds, S1 normal and S2 normal  No murmur  No friction rub  No gallop  No S3 or S4 sounds  Pulmonary:      Effort: Pulmonary effort is normal  No accessory muscle usage or respiratory distress  Breath sounds: Normal breath sounds  No decreased breath sounds, wheezing, rhonchi or rales  Chest:      Chest wall: No tenderness  Abdominal:      General: Abdomen is flat  Bowel sounds are normal  There is no distension  Palpations: Abdomen is soft  There is no hepatomegaly, splenomegaly or mass  Tenderness: There is no abdominal tenderness  Musculoskeletal: Normal range of motion  General: No swelling, tenderness or deformity  Lymphadenopathy:      Cervical: No cervical adenopathy  Skin:     Coloration: Skin is not jaundiced or pale  Findings: No abrasion, bruising, erythema, lesion or rash  Nails: There is no clubbing  Neurological:      Mental Status: She is alert and oriented to person, place, and time  Mental status is at baseline  Comments: Facial symmetry is retained  Extraocular movements are retained  Head neck tongue and palate movement are retained and symmetric   Psychiatric:         Mood and Affect: Mood normal          Speech: Speech normal          Behavior: Behavior normal          Thought Content:  Thought content normal          Discussion/Summary:     1  Paroxysmal atrial fibrillation  Patient was originally seen in June 2016  Was started on sotalol at that time   Did well since then with only 2 major episodes    Recently admitted twice in Jan 2021 with RVR and diastolic heart failure     We had a very detailed discussion as far as management of atrial fibrillation   my detailed recommendation for this patient are as follows         1 - natural history of disease   atrial fibrillation is a disease of age   prevalence is 1% in the 4th decade , 2-3% by age 72 and 12-13% by age 80   since it increases with age , definitive therapy is indicated         2 - sleep apnea   untreated sleep apnea is the common cause of failure of medication as well as ablation   success rate of paroxysmal atrial fibrillation ablation falls from 80% in the 1st year, to 15% in the 1st year, for untreated severe sleep apnea   the patient has a history of snoring, morning fatigue at times and daytime sleepiness at times  physical examination for short thick neck and crowded posterior pharynx is -positive  patient is recommended to follow up with Pulmonary and Sleep Medicine - for AKIN   No significant sleep apnea in 2016        3 - thyroid function   hyperthyroidism is a common precipitator of atrial fibrillation   TSH - 1 759 in Sept 2018  Recently underwent therapy for thyroid cancer         4 -Aggressive management of  hypertension - on amlodepin  diabetes mellitus   heart failure - LVEF =60%        5 - anticoagulation   patient's chads Vasc score is -3  hypertension   age   gender      Is on apixaban     6 - rate control medication   beta-blocker effect of sotalol           7 - rhythm control medication    class 3 agent - Sotalol and dofetilide   both will need hospital admission to monitor first 5 doses over the initial 2 and half days     sotalol has intrinsic beta-blocker properties   dofetilide may need an additional beta-blocker along with it for rate control  Keep potassium between 4 and 4 5   keep magnesium between 2 and 2 5   follow QTC   Follow creatinine   Patient is on sotalol       8 - ablation   this is the most effective method to achieve and maintain sinus rhythm      paroxysmal atrial fibrillation - 70-80% chance in the 1st year  and falls to 50% by 5 years   persistent atrial fibrillation - 50-60% chance in the 1st year and falls to 30% or less by 5 years      we use a combination of cryo and radiofrequency ablation      there is a 2-5% chance of serious complication which include - bleeding around access site, heart attack , stroke , bleeding around the heart requiring drainage , perforation requiring open heart surgery , phrenic nerve paralysis causing diaphragmatic paralysis, atrial esophageal fistula   we do deployed multiple methods to prevent such complication :  - heparin anticoagulation with ACT between 350 and 400s to prevent stroke   - coronary angiography if we are going to ablate close to any major blood vessel   -access to the pericardial drain if pericardial effusion were to happen   - pressure sensing catheters to reduce excessive pressure and chances of perforation   - esophageal temperature monitoring to reduce chances of injury       proceed with ablation     Summary of recommendation:  CTPV, SHANNAN before procedure , hold eliquis before procedure  For 36 hrs  Proceed with ablation              2  Hypertension  Currently reasonably controlled   Losartan, metoprolol  144/70  Need to titrate meds         3  Thyroid cancer   Recently underwent surgery   Check TSH        4  Hyperlipidemia   On zetia       5   Long term anticoagulation  CHADSVASc -3  On apixaban            summary of my recommendation for patient   CT pulmonary vein   SHANNAN before the procedure   Hold Eliquis the day before and morning of procedure  Cryo NavX

## 2021-02-10 ENCOUNTER — TELEPHONE (OUTPATIENT)
Dept: CARDIOLOGY CLINIC | Facility: CLINIC | Age: 76
End: 2021-02-10

## 2021-02-10 DIAGNOSIS — I48.0 PAROXYSMAL ATRIAL FIBRILLATION (HCC): Primary | ICD-10-CM

## 2021-02-10 NOTE — TELEPHONE ENCOUNTER
COVID Pre-Visit Screening     1  Is this a family member screening? Yes  2  Have you traveled outside of your state in the past 2 weeks? No  3  Do you presently have a fever or flu-like symptoms? No  4  Do you have symptoms of an upper respiratory infection like runny nose, sore throat, or cough? No  5  Are you suffering from new headache that you have not had in the past?  No  6  Do you have/have you experienced any new shortness of breath recently? No  7  Do you have any new diarrhea, nausea or vomiting? No  8  Have you been in contact with anyone who has been sick or diagnosed with COVID-19? No  9  Do you have any new loss of taste or smell? No  10  Are you able to wear a mask without a valve for the entire visit? Yes     Patient schedule for SHANNAN/Afib ablation at [facility] on 3/12/212 with Dr Corrie Menjivar  Patient also schedule for CT PV at [facility] on 2/17/21  Patient aware of general instructions, medications holds (Eliquis) and blood test require  Patient cover under medicare

## 2021-02-15 ENCOUNTER — CLINICAL SUPPORT (OUTPATIENT)
Dept: CARDIOLOGY CLINIC | Facility: CLINIC | Age: 76
End: 2021-02-15
Payer: MEDICARE

## 2021-02-15 DIAGNOSIS — I48.0 PAROXYSMAL ATRIAL FIBRILLATION (HCC): ICD-10-CM

## 2021-02-15 DIAGNOSIS — I48.0 PAROXYSMAL ATRIAL FIBRILLATION (HCC): Primary | ICD-10-CM

## 2021-02-15 PROCEDURE — 93248 EXT ECG>7D<15D REV&INTERPJ: CPT | Performed by: INTERNAL MEDICINE

## 2021-02-17 ENCOUNTER — HOSPITAL ENCOUNTER (OUTPATIENT)
Dept: RADIOLOGY | Facility: HOSPITAL | Age: 76
Discharge: HOME | End: 2021-02-17
Attending: INTERNAL MEDICINE
Payer: MEDICARE

## 2021-02-17 DIAGNOSIS — I48.0 PAROXYSMAL ATRIAL FIBRILLATION (HCC): ICD-10-CM

## 2021-02-17 PROCEDURE — G1004 CDSM NDSC: HCPCS

## 2021-02-17 PROCEDURE — 75572 CT HRT W/3D IMAGE: CPT

## 2021-02-17 RX ADMIN — IODIXANOL 120 ML: 320 INJECTION, SOLUTION INTRAVASCULAR at 09:58

## 2021-02-20 NOTE — RESULT ENCOUNTER NOTE
Normal Device Function  Brief episodes of SVT   Symptoms associated with the same, occurring within 45 seconds of the same  High suspicion this is paroxysmal atrial tachycardia

## 2021-02-23 ENCOUNTER — LAB (OUTPATIENT)
Dept: LAB | Facility: CLINIC | Age: 76
End: 2021-02-23
Payer: MEDICARE

## 2021-02-23 LAB
ALBUMIN SERPL BCP-MCNC: 3.8 G/DL (ref 3.5–5)
ALP SERPL-CCNC: 64 U/L (ref 46–116)
ALT SERPL W P-5'-P-CCNC: 24 U/L (ref 12–78)
ANION GAP SERPL CALCULATED.3IONS-SCNC: 2 MMOL/L (ref 4–13)
AST SERPL W P-5'-P-CCNC: 15 U/L (ref 5–45)
BASOPHILS # BLD AUTO: 0.06 THOUSANDS/ΜL (ref 0–0.1)
BASOPHILS NFR BLD AUTO: 1 % (ref 0–1)
BILIRUB SERPL-MCNC: 0.97 MG/DL (ref 0.2–1)
BUN SERPL-MCNC: 21 MG/DL (ref 5–25)
CALCIUM SERPL-MCNC: 9.9 MG/DL (ref 8.3–10.1)
CHLORIDE SERPL-SCNC: 108 MMOL/L (ref 100–108)
CO2 SERPL-SCNC: 31 MMOL/L (ref 21–32)
CREAT SERPL-MCNC: 0.92 MG/DL (ref 0.6–1.3)
EOSINOPHIL # BLD AUTO: 0.11 THOUSAND/ΜL (ref 0–0.61)
EOSINOPHIL NFR BLD AUTO: 2 % (ref 0–6)
ERYTHROCYTE [DISTWIDTH] IN BLOOD BY AUTOMATED COUNT: 12.1 % (ref 11.6–15.1)
GFR SERPL CREATININE-BSD FRML MDRD: 61 ML/MIN/1.73SQ M
GLUCOSE SERPL-MCNC: 99 MG/DL (ref 65–140)
HCT VFR BLD AUTO: 38.9 % (ref 34.8–46.1)
HGB BLD-MCNC: 12.5 G/DL (ref 11.5–15.4)
IMM GRANULOCYTES # BLD AUTO: 0.01 THOUSAND/UL (ref 0–0.2)
IMM GRANULOCYTES NFR BLD AUTO: 0 % (ref 0–2)
LYMPHOCYTES # BLD AUTO: 1.5 THOUSANDS/ΜL (ref 0.6–4.47)
LYMPHOCYTES NFR BLD AUTO: 30 % (ref 14–44)
MCH RBC QN AUTO: 31.7 PG (ref 26.8–34.3)
MCHC RBC AUTO-ENTMCNC: 32.1 G/DL (ref 31.4–37.4)
MCV RBC AUTO: 99 FL (ref 82–98)
MONOCYTES # BLD AUTO: 0.47 THOUSAND/ΜL (ref 0.17–1.22)
MONOCYTES NFR BLD AUTO: 9 % (ref 4–12)
NEUTROPHILS # BLD AUTO: 2.84 THOUSANDS/ΜL (ref 1.85–7.62)
NEUTS SEG NFR BLD AUTO: 58 % (ref 43–75)
NRBC BLD AUTO-RTO: 0 /100 WBCS
PLATELET # BLD AUTO: 217 THOUSANDS/UL (ref 149–390)
PMV BLD AUTO: 11 FL (ref 8.9–12.7)
POTASSIUM SERPL-SCNC: 4.5 MMOL/L (ref 3.5–5.3)
PROT SERPL-MCNC: 7.1 G/DL (ref 6.4–8.2)
RBC # BLD AUTO: 3.94 MILLION/UL (ref 3.81–5.12)
SODIUM SERPL-SCNC: 141 MMOL/L (ref 136–145)
WBC # BLD AUTO: 4.99 THOUSAND/UL (ref 4.31–10.16)

## 2021-02-23 PROCEDURE — 85025 COMPLETE CBC W/AUTO DIFF WBC: CPT

## 2021-02-23 PROCEDURE — 36415 COLL VENOUS BLD VENIPUNCTURE: CPT

## 2021-02-23 PROCEDURE — 80053 COMPREHEN METABOLIC PANEL: CPT

## 2021-02-24 ENCOUNTER — IMMUNIZATIONS (OUTPATIENT)
Dept: FAMILY MEDICINE CLINIC | Facility: HOSPITAL | Age: 76
End: 2021-02-24

## 2021-02-24 DIAGNOSIS — Z23 ENCOUNTER FOR IMMUNIZATION: Primary | ICD-10-CM

## 2021-02-24 PROCEDURE — 0012A SARS-COV-2 / COVID-19 MRNA VACCINE (MODERNA) 100 MCG: CPT

## 2021-02-24 PROCEDURE — 91301 SARS-COV-2 / COVID-19 MRNA VACCINE (MODERNA) 100 MCG: CPT

## 2021-03-09 NOTE — TELEPHONE ENCOUNTER
Pt called leaving a message asking if it possible to get a time for her surgery before the night before because her  is handicapped      Please call pt back

## 2021-03-11 ENCOUNTER — TELEPHONE (OUTPATIENT)
Dept: SURGERY | Facility: HOSPITAL | Age: 76
End: 2021-03-11

## 2021-03-11 RX ORDER — PANTOPRAZOLE SODIUM 40 MG/1
40 INJECTION, POWDER, FOR SOLUTION INTRAVENOUS ONCE
Status: CANCELLED | OUTPATIENT
Start: 2021-03-11 | End: 2021-03-11

## 2021-03-11 NOTE — TELEPHONE ENCOUNTER
Patient have procedure on tomorrow she is asking if will be ok to use saline solution tonight please advice  Thank you

## 2021-03-12 ENCOUNTER — ANESTHESIA (OUTPATIENT)
Dept: NON INVASIVE DIAGNOSTICS | Facility: HOSPITAL | Age: 76
DRG: 243 | End: 2021-03-12
Payer: MEDICARE

## 2021-03-12 ENCOUNTER — HOSPITAL ENCOUNTER (INPATIENT)
Dept: NON INVASIVE DIAGNOSTICS | Facility: HOSPITAL | Age: 76
LOS: 3 days | Discharge: HOME | DRG: 243 | End: 2021-03-16
Attending: INTERNAL MEDICINE | Admitting: INTERNAL MEDICINE
Payer: MEDICARE

## 2021-03-12 ENCOUNTER — APPOINTMENT (OUTPATIENT)
Dept: NON INVASIVE DIAGNOSTICS | Facility: HOSPITAL | Age: 76
DRG: 243 | End: 2021-03-12
Attending: INTERNAL MEDICINE
Payer: MEDICARE

## 2021-03-12 DIAGNOSIS — I48.0 PAROXYSMAL ATRIAL FIBRILLATION (HCC): ICD-10-CM

## 2021-03-12 LAB
ANION GAP SERPL CALCULATED.3IONS-SCNC: 8 MMOL/L (ref 4–13)
ATRIAL RATE: 55 BPM
BASOPHILS # BLD AUTO: 0.07 THOUSANDS/ΜL (ref 0–0.1)
BASOPHILS NFR BLD AUTO: 1 % (ref 0–1)
BUN SERPL-MCNC: 21 MG/DL (ref 5–25)
CALCIUM SERPL-MCNC: 9.7 MG/DL (ref 8.3–10.1)
CHLORIDE SERPL-SCNC: 108 MMOL/L (ref 100–108)
CO2 SERPL-SCNC: 28 MMOL/L (ref 21–32)
CREAT SERPL-MCNC: 0.92 MG/DL (ref 0.6–1.3)
EOSINOPHIL # BLD AUTO: 0.16 THOUSAND/ΜL (ref 0–0.61)
EOSINOPHIL NFR BLD AUTO: 3 % (ref 0–6)
ERYTHROCYTE [DISTWIDTH] IN BLOOD BY AUTOMATED COUNT: 12.3 % (ref 11.6–15.1)
GFR SERPL CREATININE-BSD FRML MDRD: 61 ML/MIN/1.73SQ M
GLUCOSE P FAST SERPL-MCNC: 107 MG/DL (ref 65–99)
GLUCOSE SERPL-MCNC: 107 MG/DL (ref 65–140)
HCT VFR BLD AUTO: 41.4 % (ref 34.8–46.1)
HGB BLD-MCNC: 13.3 G/DL (ref 11.5–15.4)
IMM GRANULOCYTES # BLD AUTO: 0.02 THOUSAND/UL (ref 0–0.2)
IMM GRANULOCYTES NFR BLD AUTO: 0 % (ref 0–2)
INR PPP: 1.06 (ref 0.84–1.19)
KCT BLD-ACNC: 236 SEC (ref 89–137)
KCT BLD-ACNC: 331 SEC (ref 89–137)
KCT BLD-ACNC: 371 SEC (ref 89–137)
KCT BLD-ACNC: 409 SEC (ref 89–137)
KCT BLD-ACNC: 422 SEC (ref 89–137)
LYMPHOCYTES # BLD AUTO: 1.65 THOUSANDS/ΜL (ref 0.6–4.47)
LYMPHOCYTES NFR BLD AUTO: 26 % (ref 14–44)
MCH RBC QN AUTO: 30.9 PG (ref 26.8–34.3)
MCHC RBC AUTO-ENTMCNC: 32.1 G/DL (ref 31.4–37.4)
MCV RBC AUTO: 96 FL (ref 82–98)
MONOCYTES # BLD AUTO: 0.62 THOUSAND/ΜL (ref 0.17–1.22)
MONOCYTES NFR BLD AUTO: 10 % (ref 4–12)
NEUTROPHILS # BLD AUTO: 3.91 THOUSANDS/ΜL (ref 1.85–7.62)
NEUTS SEG NFR BLD AUTO: 60 % (ref 43–75)
NRBC BLD AUTO-RTO: 0 /100 WBCS
P AXIS: 34 DEGREES
PLATELET # BLD AUTO: 238 THOUSANDS/UL (ref 149–390)
PMV BLD AUTO: 10.4 FL (ref 8.9–12.7)
POTASSIUM SERPL-SCNC: 4.3 MMOL/L (ref 3.5–5.3)
PR INTERVAL: 168 MS
PROTHROMBIN TIME: 13.8 SECONDS (ref 11.6–14.5)
QRS AXIS: 19 DEGREES
QRSD INTERVAL: 94 MS
QT INTERVAL: 522 MS
QTC INTERVAL: 499 MS
RBC # BLD AUTO: 4.3 MILLION/UL (ref 3.81–5.12)
SODIUM SERPL-SCNC: 144 MMOL/L (ref 136–145)
SPECIMEN SOURCE: ABNORMAL
T WAVE AXIS: -10 DEGREES
VENTRICULAR RATE: 55 BPM
WBC # BLD AUTO: 6.43 THOUSAND/UL (ref 4.31–10.16)

## 2021-03-12 PROCEDURE — 85347 COAGULATION TIME ACTIVATED: CPT

## 2021-03-12 PROCEDURE — 93656 COMPRE EP EVAL ABLTJ ATR FIB: CPT | Performed by: INTERNAL MEDICINE

## 2021-03-12 PROCEDURE — C1893 INTRO/SHEATH, FIXED,NON-PEEL: HCPCS | Performed by: INTERNAL MEDICINE

## 2021-03-12 PROCEDURE — C1759 CATH, INTRA ECHOCARDIOGRAPHY: HCPCS | Performed by: INTERNAL MEDICINE

## 2021-03-12 PROCEDURE — 02K83ZZ MAP CONDUCTION MECHANISM, PERCUTANEOUS APPROACH: ICD-10-PCS | Performed by: INTERNAL MEDICINE

## 2021-03-12 PROCEDURE — 85025 COMPLETE CBC W/AUTO DIFF WBC: CPT | Performed by: PHYSICIAN ASSISTANT

## 2021-03-12 PROCEDURE — NC001 PR NO CHARGE: Performed by: PHYSICIAN ASSISTANT

## 2021-03-12 PROCEDURE — C1733 CATH, EP, OTHR THAN COOL-TIP: HCPCS | Performed by: INTERNAL MEDICINE

## 2021-03-12 PROCEDURE — C1894 INTRO/SHEATH, NON-LASER: HCPCS | Performed by: INTERNAL MEDICINE

## 2021-03-12 PROCEDURE — 93010 ELECTROCARDIOGRAM REPORT: CPT | Performed by: INTERNAL MEDICINE

## 2021-03-12 PROCEDURE — 02583ZZ DESTRUCTION OF CONDUCTION MECHANISM, PERCUTANEOUS APPROACH: ICD-10-PCS | Performed by: INTERNAL MEDICINE

## 2021-03-12 PROCEDURE — C1769 GUIDE WIRE: HCPCS | Performed by: INTERNAL MEDICINE

## 2021-03-12 PROCEDURE — 5A2204Z RESTORATION OF CARDIAC RHYTHM, SINGLE: ICD-10-PCS | Performed by: INTERNAL MEDICINE

## 2021-03-12 PROCEDURE — C1730 CATH, EP, 19 OR FEW ELECT: HCPCS | Performed by: INTERNAL MEDICINE

## 2021-03-12 PROCEDURE — 93005 ELECTROCARDIOGRAM TRACING: CPT

## 2021-03-12 PROCEDURE — 93613 INTRACARDIAC EPHYS 3D MAPG: CPT | Performed by: INTERNAL MEDICINE

## 2021-03-12 PROCEDURE — 92960 CARDIOVERSION ELECTRIC EXT: CPT | Performed by: INTERNAL MEDICINE

## 2021-03-12 PROCEDURE — 85610 PROTHROMBIN TIME: CPT | Performed by: PHYSICIAN ASSISTANT

## 2021-03-12 PROCEDURE — 93662 INTRACARDIAC ECG (ICE): CPT | Performed by: INTERNAL MEDICINE

## 2021-03-12 PROCEDURE — 76937 US GUIDE VASCULAR ACCESS: CPT | Performed by: INTERNAL MEDICINE

## 2021-03-12 PROCEDURE — 80048 BASIC METABOLIC PNL TOTAL CA: CPT | Performed by: PHYSICIAN ASSISTANT

## 2021-03-12 PROCEDURE — C9113 INJ PANTOPRAZOLE SODIUM, VIA: HCPCS | Performed by: PHYSICIAN ASSISTANT

## 2021-03-12 PROCEDURE — NC001 PR NO CHARGE: Performed by: INTERNAL MEDICINE

## 2021-03-12 RX ORDER — HEPARIN SODIUM 1000 [USP'U]/ML
INJECTION, SOLUTION INTRAVENOUS; SUBCUTANEOUS CODE/TRAUMA/SEDATION MEDICATION
Status: COMPLETED | OUTPATIENT
Start: 2021-03-12 | End: 2021-03-12

## 2021-03-12 RX ORDER — HYDROMORPHONE HCL/PF 1 MG/ML
0.2 SYRINGE (ML) INJECTION
Status: DISCONTINUED | OUTPATIENT
Start: 2021-03-12 | End: 2021-03-16 | Stop reason: HOSPADM

## 2021-03-12 RX ORDER — GABAPENTIN 100 MG/1
200 CAPSULE ORAL
Status: DISCONTINUED | OUTPATIENT
Start: 2021-03-12 | End: 2021-03-16 | Stop reason: HOSPADM

## 2021-03-12 RX ORDER — TRAMADOL HYDROCHLORIDE 50 MG/1
50 TABLET ORAL 2 TIMES DAILY PRN
Status: DISCONTINUED | OUTPATIENT
Start: 2021-03-12 | End: 2021-03-16 | Stop reason: HOSPADM

## 2021-03-12 RX ORDER — ZOLPIDEM TARTRATE 5 MG/1
10 TABLET ORAL
Status: DISCONTINUED | OUTPATIENT
Start: 2021-03-12 | End: 2021-03-16 | Stop reason: HOSPADM

## 2021-03-12 RX ORDER — LOSARTAN POTASSIUM 50 MG/1
50 TABLET ORAL DAILY
Status: DISCONTINUED | OUTPATIENT
Start: 2021-03-13 | End: 2021-03-12

## 2021-03-12 RX ORDER — DEXAMETHASONE SODIUM PHOSPHATE 10 MG/ML
INJECTION, SOLUTION INTRAMUSCULAR; INTRAVENOUS AS NEEDED
Status: DISCONTINUED | OUTPATIENT
Start: 2021-03-12 | End: 2021-03-12

## 2021-03-12 RX ORDER — GLYCOPYRROLATE 0.2 MG/ML
INJECTION INTRAMUSCULAR; INTRAVENOUS AS NEEDED
Status: DISCONTINUED | OUTPATIENT
Start: 2021-03-12 | End: 2021-03-12

## 2021-03-12 RX ORDER — LIDOCAINE HYDROCHLORIDE 10 MG/ML
INJECTION, SOLUTION EPIDURAL; INFILTRATION; INTRACAUDAL; PERINEURAL CODE/TRAUMA/SEDATION MEDICATION
Status: COMPLETED | OUTPATIENT
Start: 2021-03-12 | End: 2021-03-12

## 2021-03-12 RX ORDER — SODIUM CHLORIDE, SODIUM LACTATE, POTASSIUM CHLORIDE, CALCIUM CHLORIDE 600; 310; 30; 20 MG/100ML; MG/100ML; MG/100ML; MG/100ML
INJECTION, SOLUTION INTRAVENOUS CONTINUOUS PRN
Status: DISCONTINUED | OUTPATIENT
Start: 2021-03-12 | End: 2021-03-12

## 2021-03-12 RX ORDER — ASCORBIC ACID 500 MG
500 TABLET ORAL DAILY
Status: DISCONTINUED | OUTPATIENT
Start: 2021-03-13 | End: 2021-03-16 | Stop reason: HOSPADM

## 2021-03-12 RX ORDER — CLINDAMYCIN PHOSPHATE 600 MG/50ML
INJECTION INTRAVENOUS AS NEEDED
Status: DISCONTINUED | OUTPATIENT
Start: 2021-03-12 | End: 2021-03-12

## 2021-03-12 RX ORDER — FENTANYL CITRATE 50 UG/ML
INJECTION, SOLUTION INTRAMUSCULAR; INTRAVENOUS AS NEEDED
Status: DISCONTINUED | OUTPATIENT
Start: 2021-03-12 | End: 2021-03-12

## 2021-03-12 RX ORDER — HEPARIN SODIUM 10000 [USP'U]/100ML
INJECTION, SOLUTION INTRAVENOUS
Status: COMPLETED | OUTPATIENT
Start: 2021-03-12 | End: 2021-03-12

## 2021-03-12 RX ORDER — FENTANYL CITRATE/PF 50 MCG/ML
50 SYRINGE (ML) INJECTION
Status: DISCONTINUED | OUTPATIENT
Start: 2021-03-12 | End: 2021-03-16 | Stop reason: HOSPADM

## 2021-03-12 RX ORDER — PROTAMINE SULFATE 10 MG/ML
INJECTION, SOLUTION INTRAVENOUS AS NEEDED
Status: DISCONTINUED | OUTPATIENT
Start: 2021-03-12 | End: 2021-03-12

## 2021-03-12 RX ORDER — ROPINIROLE 0.25 MG/1
0.25 TABLET, FILM COATED ORAL
Status: DISCONTINUED | OUTPATIENT
Start: 2021-03-12 | End: 2021-03-16 | Stop reason: HOSPADM

## 2021-03-12 RX ORDER — EPHEDRINE SULFATE 50 MG/ML
INJECTION INTRAVENOUS AS NEEDED
Status: DISCONTINUED | OUTPATIENT
Start: 2021-03-12 | End: 2021-03-12

## 2021-03-12 RX ORDER — PANTOPRAZOLE SODIUM 40 MG/1
40 INJECTION, POWDER, FOR SOLUTION INTRAVENOUS ONCE
Status: COMPLETED | OUTPATIENT
Start: 2021-03-12 | End: 2021-03-12

## 2021-03-12 RX ORDER — SODIUM CHLORIDE, SODIUM LACTATE, POTASSIUM CHLORIDE, CALCIUM CHLORIDE 600; 310; 30; 20 MG/100ML; MG/100ML; MG/100ML; MG/100ML
75 INJECTION, SOLUTION INTRAVENOUS CONTINUOUS
Status: DISCONTINUED | OUTPATIENT
Start: 2021-03-12 | End: 2021-03-12

## 2021-03-12 RX ORDER — SUCCINYLCHOLINE/SOD CL,ISO/PF 100 MG/5ML
SYRINGE (ML) INTRAVENOUS AS NEEDED
Status: DISCONTINUED | OUTPATIENT
Start: 2021-03-12 | End: 2021-03-12

## 2021-03-12 RX ORDER — VANCOMYCIN HYDROCHLORIDE 1 G/200ML
1000 INJECTION, SOLUTION INTRAVENOUS ONCE
Status: COMPLETED | OUTPATIENT
Start: 2021-03-15 | End: 2021-03-15

## 2021-03-12 RX ORDER — ONDANSETRON 2 MG/ML
4 INJECTION INTRAMUSCULAR; INTRAVENOUS ONCE AS NEEDED
Status: DISCONTINUED | OUTPATIENT
Start: 2021-03-12 | End: 2021-03-12

## 2021-03-12 RX ORDER — HYDROXYZINE HYDROCHLORIDE 25 MG/1
50 TABLET, FILM COATED ORAL EVERY 6 HOURS PRN
Status: DISCONTINUED | OUTPATIENT
Start: 2021-03-12 | End: 2021-03-16 | Stop reason: HOSPADM

## 2021-03-12 RX ORDER — PANTOPRAZOLE SODIUM 40 MG/1
40 TABLET, DELAYED RELEASE ORAL DAILY
Status: DISCONTINUED | OUTPATIENT
Start: 2021-03-13 | End: 2021-03-16 | Stop reason: HOSPADM

## 2021-03-12 RX ORDER — LIDOCAINE HYDROCHLORIDE 10 MG/ML
INJECTION, SOLUTION EPIDURAL; INFILTRATION; INTRACAUDAL; PERINEURAL AS NEEDED
Status: DISCONTINUED | OUTPATIENT
Start: 2021-03-12 | End: 2021-03-12

## 2021-03-12 RX ORDER — EZETIMIBE 10 MG/1
10 TABLET ORAL
Status: DISCONTINUED | OUTPATIENT
Start: 2021-03-12 | End: 2021-03-16 | Stop reason: HOSPADM

## 2021-03-12 RX ORDER — LOSARTAN POTASSIUM 50 MG/1
50 TABLET ORAL DAILY
Status: DISCONTINUED | OUTPATIENT
Start: 2021-03-12 | End: 2021-03-12

## 2021-03-12 RX ORDER — ACETAMINOPHEN 325 MG/1
650 TABLET ORAL EVERY 4 HOURS PRN
Status: DISCONTINUED | OUTPATIENT
Start: 2021-03-12 | End: 2021-03-16 | Stop reason: HOSPADM

## 2021-03-12 RX ORDER — ESCITALOPRAM OXALATE 5 MG/1
5 TABLET ORAL DAILY
Status: DISCONTINUED | OUTPATIENT
Start: 2021-03-13 | End: 2021-03-16 | Stop reason: HOSPADM

## 2021-03-12 RX ORDER — LOSARTAN POTASSIUM 50 MG/1
50 TABLET ORAL DAILY
Status: DISCONTINUED | OUTPATIENT
Start: 2021-03-12 | End: 2021-03-16 | Stop reason: HOSPADM

## 2021-03-12 RX ORDER — PROPOFOL 10 MG/ML
INJECTION, EMULSION INTRAVENOUS AS NEEDED
Status: DISCONTINUED | OUTPATIENT
Start: 2021-03-12 | End: 2021-03-12

## 2021-03-12 RX ORDER — SODIUM CHLORIDE 9 MG/ML
INJECTION, SOLUTION INTRAVENOUS CONTINUOUS PRN
Status: DISCONTINUED | OUTPATIENT
Start: 2021-03-12 | End: 2021-03-12

## 2021-03-12 RX ADMIN — HEPARIN SODIUM 12000 UNITS: 1000 INJECTION INTRAVENOUS; SUBCUTANEOUS at 13:33

## 2021-03-12 RX ADMIN — HEPARIN SODIUM 1000 UNITS/HR: 10000 INJECTION, SOLUTION INTRAVENOUS at 13:33

## 2021-03-12 RX ADMIN — EZETIMIBE 10 MG: 10 TABLET ORAL at 21:08

## 2021-03-12 RX ADMIN — SODIUM CHLORIDE: 0.9 INJECTION, SOLUTION INTRAVENOUS at 12:37

## 2021-03-12 RX ADMIN — NOREPINEPHRINE BITARTRATE 2 MCG/MIN: 1 INJECTION, SOLUTION, CONCENTRATE INTRAVENOUS at 13:28

## 2021-03-12 RX ADMIN — PROPOFOL 200 MG: 10 INJECTION, EMULSION INTRAVENOUS at 12:37

## 2021-03-12 RX ADMIN — GLYCOPYRROLATE 0.2 MG: 0.2 INJECTION, SOLUTION INTRAMUSCULAR; INTRAVENOUS at 13:44

## 2021-03-12 RX ADMIN — CLINDAMYCIN PHOSPHATE 600 MG: 600 INJECTION, SOLUTION INTRAVENOUS at 13:06

## 2021-03-12 RX ADMIN — PHENYLEPHRINE HYDROCHLORIDE 30 MCG/MIN: 10 INJECTION INTRAVENOUS at 12:51

## 2021-03-12 RX ADMIN — PANTOPRAZOLE SODIUM 40 MG: 40 INJECTION, POWDER, FOR SOLUTION INTRAVENOUS at 13:22

## 2021-03-12 RX ADMIN — IOHEXOL 50 ML: 350 INJECTION, SOLUTION INTRAVENOUS at 15:26

## 2021-03-12 RX ADMIN — HEPARIN SODIUM 2000 UNITS: 1000 INJECTION INTRAVENOUS; SUBCUTANEOUS at 14:10

## 2021-03-12 RX ADMIN — LIDOCAINE HYDROCHLORIDE 10 ML: 10 INJECTION, SOLUTION EPIDURAL; INFILTRATION; INTRACAUDAL; PERINEURAL at 13:32

## 2021-03-12 RX ADMIN — GLYCOPYRROLATE 0.2 MG: 0.2 INJECTION, SOLUTION INTRAMUSCULAR; INTRAVENOUS at 14:14

## 2021-03-12 RX ADMIN — ACETAMINOPHEN 650 MG: 325 TABLET, FILM COATED ORAL at 18:12

## 2021-03-12 RX ADMIN — LOSARTAN POTASSIUM 50 MG: 50 TABLET, FILM COATED ORAL at 18:12

## 2021-03-12 RX ADMIN — PROTAMINE SULFATE 70 MG: 10 INJECTION, SOLUTION INTRAVENOUS at 15:28

## 2021-03-12 RX ADMIN — HEPARIN SODIUM 10000 UNITS: 1000 INJECTION INTRAVENOUS; SUBCUTANEOUS at 13:50

## 2021-03-12 RX ADMIN — GABAPENTIN 200 MG: 100 CAPSULE ORAL at 21:08

## 2021-03-12 RX ADMIN — FENTANYL CITRATE 100 MCG: 50 INJECTION INTRAMUSCULAR; INTRAVENOUS at 12:37

## 2021-03-12 RX ADMIN — FENTANYL CITRATE 50 MCG: 50 INJECTION INTRAMUSCULAR; INTRAVENOUS at 15:24

## 2021-03-12 RX ADMIN — ROPINIROLE 0.25 MG: 0.25 TABLET, FILM COATED ORAL at 21:08

## 2021-03-12 RX ADMIN — EPHEDRINE SULFATE 5 MG: 50 INJECTION, SOLUTION INTRAVENOUS at 12:55

## 2021-03-12 RX ADMIN — DEXAMETHASONE SODIUM PHOSPHATE 10 MG: 10 INJECTION, SOLUTION INTRAMUSCULAR; INTRAVENOUS at 13:18

## 2021-03-12 RX ADMIN — EPHEDRINE SULFATE 5 MG: 50 INJECTION, SOLUTION INTRAVENOUS at 13:31

## 2021-03-12 RX ADMIN — APIXABAN 5 MG: 5 TABLET, FILM COATED ORAL at 17:28

## 2021-03-12 RX ADMIN — Medication 100 MG: at 12:37

## 2021-03-12 RX ADMIN — ZOLPIDEM TARTRATE 10 MG: 5 TABLET, COATED ORAL at 21:08

## 2021-03-12 RX ADMIN — LIDOCAINE HYDROCHLORIDE 50 MG: 10 INJECTION, SOLUTION EPIDURAL; INFILTRATION; INTRACAUDAL; PERINEURAL at 12:37

## 2021-03-12 NOTE — ANESTHESIA PREPROCEDURE EVALUATION
Procedure:  CARDIAC EPS/AFIB ABLATION    Relevant Problems   CARDIO   (+) Essential hypertension   (+) Hyperlipidemia   (+) Paroxysmal atrial fibrillation (HCC)      MUSCULOSKELETAL   (+) Arthritis of both acromioclavicular joints   (+) Fibromyalgia   (+) Lumbar spondylosis   (+) Osteoarthritis of knee   (+) Osteoarthritis of shoulder   (+) Osteoarthrosis, hand      NEURO/PSYCH   (+) Fibromyalgia      LEFT VENTRICLE: Size was normal  Systolic function was normal  Ejection fraction was estimated to be 55 %  There were no regional wall motion abnormalities  Wall thickness was at the upper limits of normal  DOPPLER: Transmitral flow  pattern: atrial fibrillation  Left ventricular diastolic function parameters were abnormal  Doppler parameters were consistent with elevated ventricular end-diastolic filling pressure      RIGHT VENTRICLE: The size was normal  Systolic function was normal  Wall thickness was normal      LEFT ATRIUM: The atrium was mildly to moderately dilated      RIGHT ATRIUM: The atrium was mildly dilated      MITRAL VALVE: Valve structure was normal  There was normal leaflet separation  DOPPLER: The transmitral velocity was within the normal range  There was mild stenosis  There was no significant regurgitation      AORTIC VALVE: The valve was trileaflet  Leaflets exhibited normal thickness and normal cuspal separation  DOPPLER: Transaortic velocity was within the normal range  There was no evidence for stenosis  There was no significant  regurgitation      TRICUSPID VALVE: The valve structure was normal  There was normal leaflet separation  DOPPLER: The transtricuspid velocity was within the normal range  There was no evidence for stenosis  There was mild to moderate regurgitation  Pulmonary  artery systolic pressure was mildly increased      PULMONIC VALVE: Leaflets exhibited normal thickness, no calcification, and normal cuspal separation   DOPPLER: The transpulmonic velocity was within the normal range  There was no significant regurgitation      PERICARDIUM: There was no pericardial effusion  The pericardium was normal in appearance      AORTA: The root exhibited normal size      SYSTEMIC VEINS: IVC: The inferior vena cava was normal in size  Physical Exam    Airway    Mallampati score: I  TM Distance: >3 FB  Neck ROM: full     Dental       Cardiovascular      Pulmonary      Other Findings        Anesthesia Plan  ASA Score- 4     Anesthesia Type- general with ASA Monitors  Additional Monitors:   Airway Plan: ETT  Plan Factors-        Patient is not a current smoker  Patient did not smoke on day of surgery  Induction- intravenous  Postoperative Plan- Plan for postoperative opioid use  Planned trial extubation    Informed Consent- Anesthetic plan and risks discussed with patient  I personally reviewed this patient with the CRNA  Discussed and agreed on the Anesthesia Plan with the CRNA  Ela Todd

## 2021-03-12 NOTE — TELEPHONE ENCOUNTER
Unsure, what exactly the question means   It is 9:00 pm  at night and I am seeing this for the 1st time

## 2021-03-12 NOTE — ANESTHESIA POSTPROCEDURE EVALUATION
Post-Op Assessment Note    CV Status:  Stable  Pain Score: 0    Pain management: adequate     Mental Status:  Alert   Hydration Status:  Stable and euvolemic   PONV Controlled:  Controlled      Post Op Vitals Reviewed: Yes      Staff: Anesthesiologist   Reason for prolonged intubation > 24 hours: This patient is not intubated      No complications documented      BP      Temp      Pulse     Resp      SpO2

## 2021-03-12 NOTE — H&P
H&P Exam - Electrophysiology  Erica Mcgovern 67 y o  female MRN: [id number]  Unit/Bed#: CW2 213-02 Encounter: [id number]    Assessment/Plan     Assessment:  1  Paroxysmal atrial fibrillation  - anticoagulated with Eliquis / [id number] score of 4 (age, sex, HTN,)  - EF of 55% per echo 1/2021 / LA mild to moderately dilated  - rate control: metoprolol tartrate 25 mg twice daily  - antiarrhythmic therapy: sotalol 80mg twice daily  - first diagnosed in June 2016 / two recent hospitalizations with RVR (breakthrough on sotalol)  2  Essential hypertension  3  Chronic diastolic congestive heart failure  4  Hyperlipidemia  - maintained on Zetia  5  Thyroid nodule  - status post lobectomy    Plan:  Patient presents today to undergo SHANNAN/atrial fibrillation ablation by Dr Sean David  ADDENDUM:  At the beginning of her ablation, patient did become bradycardic with prolongation of QT interval which subsequently progressed to torsades  Patient was shocked  Pulmonary vein isolation was followed through  The postop ablation setting, discussion was had with her family that patient would continue to be monitored over the weekend and once recuperated from her ablation would proceed with pacemaker implantation on Monday  Over the weekend, will hold metoprolol and sotalol to keep from further bradycardia  History of Present Illness   HPI:  Erica Mcgovern is a 67y o  year old female with paroxysmal atrial fibrillation anticoagulated with Eliquis, essential hypertension, chronic diastolic congestive heart failure, hyperlipidemia, and prior thyroid nodules status post lobectomy  Patient has carried the diagnosis of atrial fibrillation since at least 2016 and has been maintained on sotalol 80 mg twice daily along with beta-blocker as an outpatient    However, more recently she did suffer to hospitalizations for atrial fibrillation with RVR and was therefore set up in consultation with Dr Sean David to discuss further EP options for this  Ablation had been recommended to the patient and she presents today to undergo this procedure  EKG:     Review of Systems  ROS as noted above, otherwise 12 point review of systems was performed and is negative       Historical Information   Past Medical History:   Diagnosis Date    Actinic keratosis     last assessed - [id number]    Arthritis     Atrial fibrillation with rapid ventricular response (HCC)     last assessed - 26Apr2016    Basal cell carcinoma     Benign colon polyp     last assessed - 27Apr2015    Disease of thyroid gland     Effusion of knee joint right     Resolved - [id number]    Esophageal reflux     Fibromyalgia     last assessed - [id number]    Fibromyalgia, primary     GERD (gastroesophageal reflux disease)     Hypertension     Palpitations     last assessed - 30Apr2013    Peroneal tendonitis, right     last assessed - [id number]    Right lumbar radiculopathy 3/17/2016    Thyroid nodule     Trochanteric bursitis of left hip 3/9/2018     Past Surgical History:   Procedure Laterality Date    CATARACT EXTRACTION Bilateral     COLONOSCOPY  03/2018    EYE SURGERY      HYSTERECTOMY      JOINT REPLACEMENT Left     knee    HI REVISE MEDIAN N/CARPAL TUNNEL SURG Right 11/14/2019    Procedure: RELEASE CARPAL TUNNEL;  Surgeon: Oneida Alarcon MD;  [facility];  Service: Orthopedics    HI THYROID LOBECTOMY,UNILAT Left 12/16/2020    Procedure: Left THYROID LOBECTOMY;  Surgeon: Sherlean Nissen, MD;  [facility];  Service: ENT    RECTAL POLYPECTOMY      REPLACEMENT TOTAL KNEE Left     last assessed - 27Apr2015    TONSILLECTOMY      TOTAL ABDOMINAL HYSTERECTOMY      TUBAL LIGATION      US GUIDED THYROID BIOPSY  10/14/2020     Family History:   Family History   Problem Relation Age of Onset    Heart disease Mother     Diabetes Mother     Heart disease Father     Coronary artery disease Father     Stroke Father         cerebrovascular accident    Heart attack Father         myocardial infarction    Sudden death Father         scd    Other Family         Back disorder    Coronary artery disease Family     Neuropathy Family     Osteoporosis Family     No Known Problems Daughter     No Known Problems Maternal Grandmother     No Known Problems Maternal Grandfather     No Known Problems Paternal Grandmother     No Known Problems Paternal Grandfather     Cancer Maternal Uncle     Breast cancer Maternal Aunt 72    No Known Problems Son     No Known Problems Maternal Aunt     No Known Problems Maternal Aunt     No Known Problems Maternal Aunt     No Known Problems Paternal Aunt     No Known Problems Paternal Aunt     Anuerysm Neg Hx     Clotting disorder Neg Hx     Arrhythmia Neg Hx     Heart failure Neg Hx        Social History   Social History     Substance and Sexual Activity   Alcohol Use Not Currently    Drinks per session: 1 or 2    Comment: occosional - every 3 months     Social History     Substance and Sexual Activity   Drug Use Never     Social History     Tobacco Use   Smoking Status Never Smoker   Smokeless Tobacco Never Used       Meds/Allergies   all medications and allergies reviewed  Home Medications:   Medications Prior to Admission   Medication    acetaminophen (TYLENOL) 650 mg CR tablet    ascorbic acid (VITAMIN C) 500 mg tablet    Cholecalciferol ([employer] VITAMIN D) 2000 UNITS CAPS    escitalopram (LEXAPRO) 5 mg tablet    ezetimibe (ZETIA) 10 mg tablet    famotidine (PEPCID) 20 mg tablet    gabapentin (NEURONTIN) 300 mg capsule    losartan (COZAAR) 50 mg tablet    metoprolol tartrate (LOPRESSOR) 25 mg tablet    sotalol (BETAPACE) 120 mg tablet    traMADol (ULTRAM) 50 mg tablet    TURMERIC PO    zolpidem (AMBIEN) 10 mg tablet    apixaban (ELIQUIS) 5 mg    Chromium Picolinate (CHROMIUM PICOLATE PO)    rOPINIRole (REQUIP) 0 25 mg tablet       Allergies   Allergen Reactions    Penicillins Other (See Comments) As a child calcium deposit in the arm     Ace Inhibitors GI Intolerance     Did feel well on it       Objective   Vitals: Blood pressure (!) 179/70, pulse (!) 53, temperature 98 6 °F (37 °C), temperature source Oral, resp  rate 18, height 5' 2" (1 575 m), weight 67 1 kg (148 lb), last menstrual period 02/01/1990, SpO2 99 %  Orthostatic Blood Pressures      Most Recent Value   Blood Pressure  (!) 179/70 filed at 03/12/2021 1039          No intake or output data in the 24 hours ending 03/12/21 1328    Invasive Devices     Peripheral Intravenous Line            Peripheral IV 03/12/21 Left Antecubital less than 1 day    Peripheral IV 03/12/21 Right Antecubital less than 1 day          Drain            Urethral Catheter Latex 16 Fr  less than 1 day          Airway            ETT  Oral;Cuffed 7 mm less than 1 day                Physical Exam   GEN: NAD, alert and oriented, well appearing  SKIN: dry without significant lesions or rashes  HEENT: NCAT, PERRL, EOMs intact  NECK: No JVD or carotid bruits appreciated  CARDIOVASCULAR: bradycardic, regular, normal S1, S2 without murmurs, rubs, or gallops appreciated  LUNGS: Clear to auscultation bilaterally without wheezes, rhonchi, or rales  ABDOMEN: Soft, nontender, nondistended  EXTREMITIES/VASCULAR: perfused without clubbing, cyanosis, or edema b/l  PSYCH: Normal mood and affect  NEURO: CN ll-Xll grossly intact    Lab Results: I have personally reviewed pertinent lab results      Results from last 7 days   Lab Units 03/12/21  1044   WBC Thousand/uL 6 43   HEMOGLOBIN g/dL 13 3   HEMATOCRIT % 41 4   PLATELETS Thousands/uL 238     Results from last 7 days   Lab Units 03/12/21  1044   POTASSIUM mmol/L 4 3   CHLORIDE mmol/L 108   CO2 mmol/L 28   BUN mg/dL 21   CREATININE mg/dL 0 92   CALCIUM mg/dL 9 7     Results from last 7 days   Lab Units 03/12/21  1044   INR  1 06             Imaging: I have personally reviewed pertinent films in PACS  Results for orders placed during the hospital encounter of 21   Echo complete with contrast if indicated    Narrative [address] [facility]  [phone]    Transthoracic Echocardiogram  2D, M-mode, Doppler, and Color Doppler    Study date:  2021    Patient: Yas Moncada  MR number: [id number]  Account number: [de-identified]  : 1945  Age: 67 years  Gender: Female  Status: Inpatient  Location: Bedside  Height: 62 in  Weight: 151 6 lb  BP: 126/ 94 mmHg    Indications: Atrial fibrillation    Diagnoses: I48 0 - Atrial fibrillation    Sonographer:  IGNACIO Cox  Primary Physician:  Maria Esther Hutchinson MD  Referring Physician:  Bob Guy MD  Group:  [id number]  Interpreting Physician:  Jannie Carney MD    SUMMARY    LEFT VENTRICLE:  Systolic function was normal  Ejection fraction was estimated to be 55 %  There were no regional wall motion abnormalities  Wall thickness was at the upper limits of normal   Doppler parameters were consistent with elevated ventricular end-diastolic filling pressure  LEFT ATRIUM:  The atrium was mildly to moderately dilated  RIGHT ATRIUM:  The atrium was mildly dilated  MITRAL VALVE:  There was mild stenosis  TRICUSPID VALVE:  There was mild to moderate regurgitation  Pulmonary artery systolic pressure was mildly increased  HISTORY: PRIOR HISTORY: HLD, HTN, PAF, chronic CHF, thyroid nodule    PROCEDURE: The procedure was performed at the bedside  This was a routine study  The transthoracic approach was used  The study included complete 2D imaging, M-mode, complete spectral Doppler, and color Doppler  The heart rate was 101 bpm,  at the start of the study  Images were obtained from the parasternal, apical, subcostal, and suprasternal notch acoustic windows  Echocardiographic views were limited due to lung interference  This was a technically difficult study      LEFT VENTRICLE: Size was normal  Systolic function was normal  Ejection fraction was estimated to be 55 %  There were no regional wall motion abnormalities  Wall thickness was at the upper limits of normal  DOPPLER: Transmitral flow  pattern: atrial fibrillation  Left ventricular diastolic function parameters were abnormal  Doppler parameters were consistent with elevated ventricular end-diastolic filling pressure  RIGHT VENTRICLE: The size was normal  Systolic function was normal  Wall thickness was normal     LEFT ATRIUM: The atrium was mildly to moderately dilated  RIGHT ATRIUM: The atrium was mildly dilated  MITRAL VALVE: Valve structure was normal  There was normal leaflet separation  DOPPLER: The transmitral velocity was within the normal range  There was mild stenosis  There was no significant regurgitation  AORTIC VALVE: The valve was trileaflet  Leaflets exhibited normal thickness and normal cuspal separation  DOPPLER: Transaortic velocity was within the normal range  There was no evidence for stenosis  There was no significant  regurgitation  TRICUSPID VALVE: The valve structure was normal  There was normal leaflet separation  DOPPLER: The transtricuspid velocity was within the normal range  There was no evidence for stenosis  There was mild to moderate regurgitation  Pulmonary  artery systolic pressure was mildly increased  PULMONIC VALVE: Leaflets exhibited normal thickness, no calcification, and normal cuspal separation  DOPPLER: The transpulmonic velocity was within the normal range  There was no significant regurgitation  PERICARDIUM: There was no pericardial effusion  The pericardium was normal in appearance  AORTA: The root exhibited normal size  SYSTEMIC VEINS: IVC: The inferior vena cava was normal in size  PULMONARY VEINS: DOPPLER: Doppler signals were not recordable in the pulmonary vein(s)      SYSTEM MEASUREMENT TABLES    2D  %FS: 37 15 %  Ao Diam: 2 74 cm  Ao asc: 2 72 cm  EDV(Teich): 103 17 ml  EF(Teich): 67 06 %  ESV(Teich): 33 98 ml  IVSd: 0 94 cm  LA Area: 13 2 cm2  LA Diam: 3 98 cm  LVEDV MOD A4C: 32 01 ml  LVEF MOD A4C: 63 74 %  LVESV MOD A4C: 11 61 ml  LVIDd: 4 72 cm  LVIDs: 2 96 cm  LVLd A4C: 5 9 cm  LVLs A4C: 4 79 cm  LVPWd: 0 93 cm  RA Area: 8 67 cm2  RVIDd: 3 35 cm  SV MOD A4C: 20 4 ml  SV(Teich): 69 19 ml    CW  TR MaxP 39 mmHg  TR Vmax: 2 89 m/s    MM  TAPSE: 1 51 cm    [facility]    Prepared and electronically signed by    Stacy Rivas MD  Signed 2021 11:05:34         Code Status: Level 1 - Full Code    ** Please Note: Dictation voice to text software may have been used in the creation of this document   **

## 2021-03-12 NOTE — ANESTHESIA POSTPROCEDURE EVALUATION
Post-Op Assessment Note    CV Status:  Stable  Pain Score: 0    Pain management: adequate     Mental Status:  Alert and awake   Hydration Status:  Euvolemic   PONV Controlled:  Controlled   Airway Patency:  Patent      Post Op Vitals Reviewed: Yes      Staff: CRNA         No complications documented      BP  170/70   Temp   97 7   Pulse 59   Resp   10   SpO2   100

## 2021-03-12 NOTE — ANESTHESIA POSTPROCEDURE EVALUATION
Post-Op Assessment Note    CV Status:  Stable  Pain Score: 0       Hydration Status:  Stable and euvolemic   PONV Controlled:  Controlled   Airway Patency:  Patent      Post Op Vitals Reviewed: Yes      Staff: Anesthesiologist   Reason for prolonged intubation > 48 hours: This patient is not intubated      No complications documented      BP      Temp      Pulse     Resp      SpO2

## 2021-03-12 NOTE — PROCEDURES
Atrial Fibrillation      History and physical were reviewed  Patient was examined and history was reviewed  No change in patient's condition Since history and physical has been completed          The pre- operative diagnosis:  Atrial fibrillation           Postoperative diagnosis:  Atrial fibrillation            Procedure:  1  Cryoablation of atrial fibrillation- PVI  2 comprehensive electrophysiologic evaluation with left atrial pacing and recording  3  3-D electro-anatomic mapping using NavX system  4  Intracardiac echocardiography  5  DCCV - Torsades/ VF  in beginning of case, singleDCCV  6  Transeptal  7  Access obtained under ultrasound guidance using Seldinger technique               Surgeon: [address] [place] [address] -none      Specimens - none      Estimated blood loss- 30 ml      Findings-none      Complications- none      Anesthesia- general anesthesia, local lidocaine by myself        SHANNAN  No SHANNAN was done   Patient has been in sinus rhythm  Patient has been on Eliquis since January 2021    Also patient had to undergo cardioversion after intubation because she went into the Torsades/ VF        Torsades /VF  The patient had been intubated at the beginning of the case  She went into torsades and ventricular fibrillation  DC cardioversion-asynchronous, single shock, 200 joules, biphasic  Cardioverted to sinus rhythm    This happened in the setting of bradycardia-Heart rate 42, and long  milliseconds             INFORMED CONSENT:   Risks, benefits, and alternatives to atrial fibrillation ablation and associated procedures discussed  The patient understood risks include but not limited to death, esophageal injury, phrenic nerve injury (diaphragm), stroke, bleeding and vascular complication, and bleeding around the heart  Details of the procedure      Sheaths used  All access was obtained under ultrasound guidance  A  Right femoral vein- 14 F for cryocath, 9 F for ICE , 7F- CS    B   Left femoral vein- 7F for high right atrial , 6F for His, 5F for RV          Catheters used  1  HRA catheters - Octapolar    2  His catheter - Cournard   3  RV catheter - Christina  4  Coronary sinus catheter - Biosense villa FJ curve  5  Ablation catheter -  Cryocath for Cryo ablation  6  Lasso - Achieve with cryocath  7  ICE - Standard Acuson          Imaging systems used  1  Fluoroscopy    Right anterior oblique views were used whenever we needed to determine between anterior and posterior  Left anterior oblique views were used whenever we needed to determine between right and left  2  Electrotomy mapping - 3D electro-anatomic mapping was done by the Doctor Mercedes 91  This was also used whenever catheter was moved  The His bundle was marked  3  ICE -for transeptal and thereafter to look at veins and appendage   Intermittently to look at pericardial space              Anticoagulation:  Heparin was used for anticoagulation to keep ACT in the therapeutic range 350-400ms      Correction at end of ablation :protamine 70             Step 1 : Cryoablation  Details of the ablation    The patient has been explained the multiple procedures which were planned  Proper consent has been obtained  Patient was brought to the electrophysiologic laboratory  Proper time out was done  General anesthesia was done and an arterial line was placed  SHANNAN was done by Cardiology, and there was no clot in the left atrial appendage  Patient was draped in a sterile fashion          All access obtained as described above  All sheaths placed as described above  Heparin was used as described above, and once ACT was over 350 we went ahead with transseptal          Transeptal   Thereafter we went ahead with transseptal  A BRK1 needle with an SLO sheath was used  Septal puncture was obtained under both fluoroscopy and intracardiac ice guidance  Position was confirmed and then cryosheath was taken to the left side              Cryoablation -sequence  Once we were on the left side, the sequence of ablation were as follows  Left superior pulmonary vein  Left inferior pulmonary vein  Right superior pulmonary vein  Right inferior pulmonary vein              Cryoablation - process   For each of the veins the following sequence was followed  -We went into the vein with Achieve catheter and an anatomy of the same was made on Navx  -Thereafter the CryoCath catheter was positioned, such that when the cryo-balloon was inflated it occluded ostium of the vein  -The occlusion was confirmed by- ventricularization of the venous waveform, intracardiac echo showing absence of venous leak, and at times using contrast venography to confirm the completeness of occlusion  - Thereafter we would come on cryoablation - following the freeze thaw freeze protocol  - Each time we made sure that therapeutic temperature was reached in an appropriate time ( usually -40 to -50 degrees in 60 sec)  - Electrograms were followed so that the pulmonary vein potentials were terminated  - The esophageal temperature was followed closely and never allowed to fall below 25 °C  - While ablating on the right side, the phrenic nerve was stimulated continuously by a catheter placed in the subclavian vein, and adequate capture of the diaphragm was confirmed throughout the process, to prevent phrenic nerve injury              Cryoablation- details for each vein                Cryoablation - confirmation of block   Entrance Block not done, as we had significant bradycardia    Exit block  The achieve catheter was placed at the ostium    Sequential pacing was done across the bipolar   At no point was able to capture the atrium and pacing through the achieve      Thereafter sheath and catheters were pulled out from the left atrium and heparin infusion was stopped             3D Electroanatomic map of Atrial fibrillation ablation                Step 2  comprehensive electrophysiologic study         Atrial stimulation   From HRA and proximal CS  ADEC and AEST  No tachycardia induced         Ventricular stimulation  From RV apical catheter   no VA conduction  Atrial activation is from proximal to distal CS            Completion of procedure  The patient was given 70  mg of protamine  ACT was  135 when all sheaths were withdrawn after sutures were placed   There is adequate hemostasis  Patient was woken up from general anesthesia  The patient is neurologically intact                  Summary of procedure :  1  Successful cryoablation for paroxysmal atrial fibrillation  Block across the pulmonary vein ostium confirmed - exit block     2   Comprehensive electrophysiologic study done          Recommendation:  Remove sutures in 24 hours before discharge    Start blood thinners in 4 hours     Discontinue sotalol  Do not use any QT prolonging medication  Keep potassium 4-4 5  Keep magnesium 2 5-3  Follow creatinine  Follow QTC    Patient to be set up for pacemaker on Monday Normal rate, regular rhythm, normal S1, S2 heart sounds heard.

## 2021-03-13 LAB
ANION GAP SERPL CALCULATED.3IONS-SCNC: 8 MMOL/L (ref 4–13)
ANION GAP SERPL CALCULATED.3IONS-SCNC: 9 MMOL/L (ref 4–13)
ATRIAL RATE: 48 BPM
BUN SERPL-MCNC: 21 MG/DL (ref 5–25)
BUN SERPL-MCNC: 22 MG/DL (ref 5–25)
CALCIUM SERPL-MCNC: 8.7 MG/DL (ref 8.3–10.1)
CALCIUM SERPL-MCNC: 9.5 MG/DL (ref 8.3–10.1)
CHLORIDE SERPL-SCNC: 104 MMOL/L (ref 100–108)
CHLORIDE SERPL-SCNC: 105 MMOL/L (ref 100–108)
CO2 SERPL-SCNC: 25 MMOL/L (ref 21–32)
CO2 SERPL-SCNC: 26 MMOL/L (ref 21–32)
CREAT SERPL-MCNC: 0.99 MG/DL (ref 0.6–1.3)
CREAT SERPL-MCNC: 1 MG/DL (ref 0.6–1.3)
ERYTHROCYTE [DISTWIDTH] IN BLOOD BY AUTOMATED COUNT: 12.1 % (ref 11.6–15.1)
GFR SERPL CREATININE-BSD FRML MDRD: 55 ML/MIN/1.73SQ M
GFR SERPL CREATININE-BSD FRML MDRD: 56 ML/MIN/1.73SQ M
GLUCOSE SERPL-MCNC: 193 MG/DL (ref 65–140)
GLUCOSE SERPL-MCNC: 203 MG/DL (ref 65–140)
HCT VFR BLD AUTO: 40.4 % (ref 34.8–46.1)
HGB BLD-MCNC: 13.2 G/DL (ref 11.5–15.4)
MAGNESIUM SERPL-MCNC: 1.8 MG/DL (ref 1.6–2.6)
MAGNESIUM SERPL-MCNC: 2 MG/DL (ref 1.6–2.6)
MCH RBC QN AUTO: 31.6 PG (ref 26.8–34.3)
MCHC RBC AUTO-ENTMCNC: 32.7 G/DL (ref 31.4–37.4)
MCV RBC AUTO: 97 FL (ref 82–98)
P AXIS: 56 DEGREES
PLATELET # BLD AUTO: 237 THOUSANDS/UL (ref 149–390)
PMV BLD AUTO: 10.9 FL (ref 8.9–12.7)
POTASSIUM SERPL-SCNC: 3.6 MMOL/L (ref 3.5–5.3)
POTASSIUM SERPL-SCNC: 4.2 MMOL/L (ref 3.5–5.3)
PR INTERVAL: 166 MS
QRS AXIS: 35 DEGREES
QRSD INTERVAL: 84 MS
QT INTERVAL: 490 MS
QTC INTERVAL: 437 MS
RBC # BLD AUTO: 4.18 MILLION/UL (ref 3.81–5.12)
SODIUM SERPL-SCNC: 138 MMOL/L (ref 136–145)
SODIUM SERPL-SCNC: 139 MMOL/L (ref 136–145)
T WAVE AXIS: 18 DEGREES
TSH SERPL DL<=0.05 MIU/L-ACNC: 1.59 UIU/ML (ref 0.36–3.74)
VENTRICULAR RATE: 48 BPM
WBC # BLD AUTO: 8.52 THOUSAND/UL (ref 4.31–10.16)

## 2021-03-13 PROCEDURE — 85027 COMPLETE CBC AUTOMATED: CPT | Performed by: PHYSICIAN ASSISTANT

## 2021-03-13 PROCEDURE — 84443 ASSAY THYROID STIM HORMONE: CPT | Performed by: STUDENT IN AN ORGANIZED HEALTH CARE EDUCATION/TRAINING PROGRAM

## 2021-03-13 PROCEDURE — 83735 ASSAY OF MAGNESIUM: CPT | Performed by: PHYSICIAN ASSISTANT

## 2021-03-13 PROCEDURE — 80048 BASIC METABOLIC PNL TOTAL CA: CPT | Performed by: PHYSICIAN ASSISTANT

## 2021-03-13 PROCEDURE — 93005 ELECTROCARDIOGRAM TRACING: CPT

## 2021-03-13 PROCEDURE — 93010 ELECTROCARDIOGRAM REPORT: CPT | Performed by: INTERNAL MEDICINE

## 2021-03-13 PROCEDURE — 84439 ASSAY OF FREE THYROXINE: CPT | Performed by: STUDENT IN AN ORGANIZED HEALTH CARE EDUCATION/TRAINING PROGRAM

## 2021-03-13 PROCEDURE — 83735 ASSAY OF MAGNESIUM: CPT | Performed by: STUDENT IN AN ORGANIZED HEALTH CARE EDUCATION/TRAINING PROGRAM

## 2021-03-13 PROCEDURE — 80048 BASIC METABOLIC PNL TOTAL CA: CPT | Performed by: STUDENT IN AN ORGANIZED HEALTH CARE EDUCATION/TRAINING PROGRAM

## 2021-03-13 PROCEDURE — 84481 FREE ASSAY (FT-3): CPT | Performed by: STUDENT IN AN ORGANIZED HEALTH CARE EDUCATION/TRAINING PROGRAM

## 2021-03-13 PROCEDURE — 99232 SBSQ HOSP IP/OBS MODERATE 35: CPT | Performed by: INTERNAL MEDICINE

## 2021-03-13 RX ORDER — FUROSEMIDE 10 MG/ML
20 INJECTION INTRAMUSCULAR; INTRAVENOUS ONCE
Status: COMPLETED | OUTPATIENT
Start: 2021-03-13 | End: 2021-03-13

## 2021-03-13 RX ORDER — METOPROLOL TARTRATE 5 MG/5ML
5 INJECTION INTRAVENOUS EVERY 6 HOURS PRN
Status: DISCONTINUED | OUTPATIENT
Start: 2021-03-13 | End: 2021-03-16 | Stop reason: HOSPADM

## 2021-03-13 RX ORDER — POTASSIUM CHLORIDE 20 MEQ/1
40 TABLET, EXTENDED RELEASE ORAL ONCE
Status: COMPLETED | OUTPATIENT
Start: 2021-03-14 | End: 2021-03-14

## 2021-03-13 RX ORDER — METOPROLOL TARTRATE 5 MG/5ML
5 INJECTION INTRAVENOUS ONCE
Status: COMPLETED | OUTPATIENT
Start: 2021-03-13 | End: 2021-03-13

## 2021-03-13 RX ADMIN — FUROSEMIDE 20 MG: 10 INJECTION, SOLUTION INTRAMUSCULAR; INTRAVENOUS at 22:27

## 2021-03-13 RX ADMIN — Medication 12.5 MG: at 22:27

## 2021-03-13 RX ADMIN — APIXABAN 5 MG: 5 TABLET, FILM COATED ORAL at 17:25

## 2021-03-13 RX ADMIN — ZOLPIDEM TARTRATE 10 MG: 5 TABLET, COATED ORAL at 23:06

## 2021-03-13 RX ADMIN — METOROPROLOL TARTRATE 5 MG: 5 INJECTION, SOLUTION INTRAVENOUS at 23:38

## 2021-03-13 RX ADMIN — APIXABAN 5 MG: 5 TABLET, FILM COATED ORAL at 09:48

## 2021-03-13 RX ADMIN — EZETIMIBE 10 MG: 10 TABLET ORAL at 22:27

## 2021-03-13 RX ADMIN — GABAPENTIN 200 MG: 100 CAPSULE ORAL at 22:27

## 2021-03-13 RX ADMIN — ROPINIROLE 0.25 MG: 0.25 TABLET, FILM COATED ORAL at 22:29

## 2021-03-13 RX ADMIN — METOROPROLOL TARTRATE 5 MG: 5 INJECTION, SOLUTION INTRAVENOUS at 21:04

## 2021-03-13 RX ADMIN — Medication 12.5 MG: at 23:38

## 2021-03-13 RX ADMIN — METOROPROLOL TARTRATE 5 MG: 5 INJECTION, SOLUTION INTRAVENOUS at 20:53

## 2021-03-13 RX ADMIN — PANTOPRAZOLE SODIUM 40 MG: 40 TABLET, DELAYED RELEASE ORAL at 09:48

## 2021-03-13 RX ADMIN — LOSARTAN POTASSIUM 50 MG: 50 TABLET, FILM COATED ORAL at 09:48

## 2021-03-13 RX ADMIN — ESCITALOPRAM 5 MG: 5 TABLET, FILM COATED ORAL at 09:48

## 2021-03-13 RX ADMIN — OXYCODONE HYDROCHLORIDE AND ACETAMINOPHEN 500 MG: 500 TABLET ORAL at 09:48

## 2021-03-13 RX ADMIN — HYDROMORPHONE HYDROCHLORIDE 0.2 MG: 1 INJECTION, SOLUTION INTRAMUSCULAR; INTRAVENOUS; SUBCUTANEOUS at 20:51

## 2021-03-13 NOTE — PLAN OF CARE
Problem: PAIN - ADULT  Goal: Verbalizes/displays adequate comfort level or baseline comfort level  Description: Interventions:  - Encourage patient to monitor pain and request assistance  - Assess pain using appropriate pain scale  - Administer analgesics based on type and severity of pain and evaluate response  - Implement non-pharmacological measures as appropriate and evaluate response  - Consider cultural and social influences on pain and pain management  - Notify physician/advanced practitioner if interventions unsuccessful or patient reports new pain  Outcome: Progressing     Problem: INFECTION - ADULT  Goal: Absence or prevention of progression during hospitalization  Description: INTERVENTIONS:  - Assess and monitor for signs and symptoms of infection  - Monitor lab/diagnostic results  - Monitor all insertion sites, i e  indwelling lines, tubes, and drains  - Monitor endotracheal if appropriate and nasal secretions for changes in amount and color  - [place] appropriate cooling/warming therapies per order  - Administer medications as ordered  - Instruct and encourage patient and family to use good hand hygiene technique  - Identify and instruct in appropriate isolation precautions for identified infection/condition  Outcome: Progressing  Goal: Absence of fever/infection during neutropenic period  Description: INTERVENTIONS:  - Monitor WBC    Outcome: Progressing     Problem: SAFETY ADULT  Goal: Patient will remain free of falls  Description: INTERVENTIONS:  - Assess patient frequently for physical needs  -  Identify cognitive and physical deficits and behaviors that affect risk of falls    -  [place] fall precautions as indicated by assessment   - Educate patient/family on patient safety including physical limitations  - Instruct patient to call for assistance with activity based on assessment  - Modify environment to reduce risk of injury  - Consider OT/PT consult to assist with strengthening/mobility  Outcome: Progressing  Goal: Maintain or return to baseline ADL function  Description: INTERVENTIONS:  -  Assess patient's ability to carry out ADLs; assess patient's baseline for ADL function and identify physical deficits which impact ability to perform ADLs (bathing, care of mouth/teeth, toileting, grooming, dressing, etc )  - Assess/evaluate cause of self-care deficits   - Assess range of motion  - Assess patient's mobility; develop plan if impaired  - Assess patient's need for assistive devices and provide as appropriate  - Encourage maximum independence but intervene and supervise when necessary  - Involve family in performance of ADLs  - Assess for home care needs following discharge   - Consider OT consult to assist with ADL evaluation and planning for discharge  - Provide patient education as appropriate  Outcome: Progressing  Goal: Maintain or return mobility status to optimal level  Description: INTERVENTIONS:  - Assess patient's baseline mobility status (ambulation, transfers, stairs, etc )    - Identify cognitive and physical deficits and behaviors that affect mobility  - Identify mobility aids required to assist with transfers and/or ambulation (gait belt, sit-to-stand, lift, walker, cane, etc )  - [place] fall precautions as indicated by assessment  - Record patient progress and toleration of activity level on Mobility SBAR; progress patient to next Phase/Stage  - Instruct patient to call for assistance with activity based on assessment  - Consider rehabilitation consult to assist with strengthening/weightbearing, etc   Outcome: Progressing     Problem: DISCHARGE PLANNING  Goal: Discharge to home or other facility with appropriate resources  Description: INTERVENTIONS:  - Identify barriers to discharge w/patient and caregiver  - Arrange for needed discharge resources and transportation as appropriate  - Identify discharge learning needs (meds, wound care, etc )  - Arrange for interpretive services to assist at discharge as needed  - Refer to Case Management Department for coordinating discharge planning if the patient needs post-hospital services based on physician/advanced practitioner order or complex needs related to functional status, cognitive ability, or social support system  Outcome: Progressing     Problem: Knowledge Deficit  Goal: Patient/family/caregiver demonstrates understanding of disease process, treatment plan, medications, and discharge instructions  Description: Complete learning assessment and assess knowledge base    Interventions:  - Provide teaching at level of understanding  - Provide teaching via preferred learning methods  Outcome: Progressing

## 2021-03-13 NOTE — PLAN OF CARE
Problem: PAIN - ADULT  Goal: Verbalizes/displays adequate comfort level or baseline comfort level  Description: Interventions:  - Encourage patient to monitor pain and request assistance  - Assess pain using appropriate pain scale  - Administer analgesics based on type and severity of pain and evaluate response  - Implement non-pharmacological measures as appropriate and evaluate response  - Consider cultural and social influences on pain and pain management  - Notify physician/advanced practitioner if interventions unsuccessful or patient reports new pain  Outcome: Progressing     Problem: INFECTION - ADULT  Goal: Absence or prevention of progression during hospitalization  Description: INTERVENTIONS:  - Assess and monitor for signs and symptoms of infection  - Monitor lab/diagnostic results  - Monitor all insertion sites, i e  indwelling lines, tubes, and drains  - Monitor endotracheal if appropriate and nasal secretions for changes in amount and color  - [place] appropriate cooling/warming therapies per order  - Administer medications as ordered  - Instruct and encourage patient and family to use good hand hygiene technique  - Identify and instruct in appropriate isolation precautions for identified infection/condition  Outcome: Progressing  Goal: Absence of fever/infection during neutropenic period  Description: INTERVENTIONS:  - Monitor WBC    Outcome: Progressing     Problem: SAFETY ADULT  Goal: Patient will remain free of falls  Description: INTERVENTIONS:  - Assess patient frequently for physical needs  -  Identify cognitive and physical deficits and behaviors that affect risk of falls    -  [place] fall precautions as indicated by assessment   - Educate patient/family on patient safety including physical limitations  - Instruct patient to call for assistance with activity based on assessment  - Modify environment to reduce risk of injury  - Consider OT/PT consult to assist with strengthening/mobility  Outcome: Progressing  Goal: Maintain or return to baseline ADL function  Description: INTERVENTIONS:  -  Assess patient's ability to carry out ADLs; assess patient's baseline for ADL function and identify physical deficits which impact ability to perform ADLs (bathing, care of mouth/teeth, toileting, grooming, dressing, etc )  - Assess/evaluate cause of self-care deficits   - Assess range of motion  - Assess patient's mobility; develop plan if impaired  - Assess patient's need for assistive devices and provide as appropriate  - Encourage maximum independence but intervene and supervise when necessary  - Involve family in performance of ADLs  - Assess for home care needs following discharge   - Consider OT consult to assist with ADL evaluation and planning for discharge  - Provide patient education as appropriate  Outcome: Progressing  Goal: Maintain or return mobility status to optimal level  Description: INTERVENTIONS:  - Assess patient's baseline mobility status (ambulation, transfers, stairs, etc )    - Identify cognitive and physical deficits and behaviors that affect mobility  - Identify mobility aids required to assist with transfers and/or ambulation (gait belt, sit-to-stand, lift, walker, cane, etc )  - [place] fall precautions as indicated by assessment  - Record patient progress and toleration of activity level on Mobility SBAR; progress patient to next Phase/Stage  - Instruct patient to call for assistance with activity based on assessment  - Consider rehabilitation consult to assist with strengthening/weightbearing, etc   Outcome: Progressing     Problem: DISCHARGE PLANNING  Goal: Discharge to home or other facility with appropriate resources  Description: INTERVENTIONS:  - Identify barriers to discharge w/patient and caregiver  - Arrange for needed discharge resources and transportation as appropriate  - Identify discharge learning needs (meds, wound care, etc )  - Arrange for interpretive services to assist at discharge as needed  - Refer to Case Management Department for coordinating discharge planning if the patient needs post-hospital services based on physician/advanced practitioner order or complex needs related to functional status, cognitive ability, or social support system  Outcome: Progressing     Problem: Knowledge Deficit  Goal: Patient/family/caregiver demonstrates understanding of disease process, treatment plan, medications, and discharge instructions  Description: Complete learning assessment and assess knowledge base    Interventions:  - Provide teaching at level of understanding  - Provide teaching via preferred learning methods  Outcome: Progressing

## 2021-03-13 NOTE — PROGRESS NOTES
Cardiology Progress Note - Amena Guardado 67 y o  female MRN: [id number]    Unit/Bed#: CW2 213-02 Encounter: [id number]    Assessment and plan  1  Status post AFib ablation  2  Bradycardia with torsades due to prolonged QT  3  Hypertension  4  Hyperlipidemia    Recommendations:  Bilateral groin sutures were removed  Catheterization sites appear to be healing well  No events on telemetry she is maintaining sinus rhythm in the 60s  No bradycardia  Potassium has been adequately corrected  Check blood work tomorrow plan for pacemaker on Monday  Subjective:    No significant events overnight  Patient feels well denies any chest pain, shortness of breath, palpitations, lightheadedness, dizziness, or syncope  ROS    Objective:   Vitals: Blood pressure 144/67, pulse 63, temperature 98 2 °F (36 8 °C), temperature source Oral, resp  rate 18, height 5' 2" (1 575 m), weight 67 1 kg (148 lb), last menstrual period 1990, SpO2 98 %  , Body mass index is 27 07 kg/m² ,   Orthostatic Blood Pressures      Most Recent Value   Blood Pressure  144/67 filed at 2021 0458   Patient Position - Orthostatic VS  Lying filed at 2021 6890         Systolic (22VUI), RP , Min:116 , ZEX:927     Diastolic (46YOS), KXO:46, Min:53, Max:90      Intake/Output Summary (Last 24 hours) at 3/13/2021 0758  Last data filed at 3/13/2021 0501  Gross per 24 hour   Intake 600 ml   Output 1043 ml   Net -443 ml     Weight (last 2 days)     Date/Time   Weight    21 1039   67 1 (148)                Telemetry Review: No significant arrhythmias seen on telemetry review  EKG personally reviewed by Allison Julio DO  Physical Exam  Vitals signs and nursing note reviewed  Constitutional:       General: She is not in acute distress  Appearance: She is well-developed  HENT:      Head: Normocephalic and atraumatic     Eyes:      Conjunctiva/sclera: Conjunctivae normal       Pupils: Pupils are equal, round, and reactive to light  Neck:      Musculoskeletal: Neck supple  Cardiovascular:      Rate and Rhythm: Normal rate and regular rhythm  Heart sounds: Normal heart sounds  No murmur  No friction rub  Pulmonary:      Effort: Pulmonary effort is normal  No respiratory distress  Breath sounds: Normal breath sounds  No wheezing or rales  Abdominal:      General: Bowel sounds are normal  There is no distension  Palpations: Abdomen is soft  Tenderness: There is no abdominal tenderness  There is no rebound  Musculoskeletal: Normal range of motion  General: No tenderness or deformity  Skin:     General: Skin is warm and dry  Findings: No erythema  Neurological:      Mental Status: She is alert and oriented to person, place, and time  Cranial Nerves: No cranial nerve deficit  Laboratory Results:        CBC with diff:   Results from last 7 days   Lab Units 03/13/21  0442 03/12/21  1044   WBC Thousand/uL 8 52 6 43   HEMOGLOBIN g/dL 13 2 13 3   HEMATOCRIT % 40 4 41 4   MCV fL 97 96   PLATELETS Thousands/uL 237 238   MCH pg 31 6 30 9   MCHC g/dL 32 7 32 1   RDW % 12 1 12 3   MPV fL 10 9 10 4   NRBC AUTO /100 WBCs  --  0         CMP:  Results from last 7 days   Lab Units 03/13/21  0442 03/12/21  1044   POTASSIUM mmol/L 4 2 4 3   CHLORIDE mmol/L 104 108   CO2 mmol/L 25 28   BUN mg/dL 22 21   CREATININE mg/dL 0 99 0 92   CALCIUM mg/dL 8 7 9 7   EGFR ml/min/1 73sq m 56 61         BMP:  Results from last 7 days   Lab Units 03/13/21  0442 03/12/21  1044   POTASSIUM mmol/L 4 2 4 3   CHLORIDE mmol/L 104 108   CO2 mmol/L 25 28   BUN mg/dL 22 21   CREATININE mg/dL 0 99 0 92   CALCIUM mg/dL 8 7 9 7       BNP: No results for input(s): BNP in the last 72 hours      Magnesium:   Results from last 7 days   Lab Units 03/13/21  0442   MAGNESIUM mg/dL 1 8       Coags:   Results from last 7 days   Lab Units 03/12/21  1044   INR  1 06       TSH:        Hemoglobin A1C       Lipid Profile: Cardiac testing:   Results for orders placed during the hospital encounter of 21   Echo complete with contrast if indicated    Narrative [facility], [address] [facility]  [phone]    Transthoracic Echocardiogram  2D, M-mode, Doppler, and Color Doppler    Study date:  2021    Patient: Quinn Barragan  MR number: [id number]  Account number: [de-identified]  : 1945  Age: 67 years  Gender: Female  Status: Inpatient  Location: Bedside  Height: 62 in  Weight: 151 6 lb  BP: 126/ 94 mmHg    Indications: Atrial fibrillation    Diagnoses: I48 0 - Atrial fibrillation    Sonographer:  IGNACIO Ojeda  Primary Physician:  Trish Stevens MD  Referring Physician:  Cristofer Levy MD  Group:  [facility] [id number]  Interpreting Physician:  Ihsan Abarca MD    SUMMARY    LEFT VENTRICLE:  Systolic function was normal  Ejection fraction was estimated to be 55 %  There were no regional wall motion abnormalities  Wall thickness was at the upper limits of normal   Doppler parameters were consistent with elevated ventricular end-diastolic filling pressure  LEFT ATRIUM:  The atrium was mildly to moderately dilated  RIGHT ATRIUM:  The atrium was mildly dilated  MITRAL VALVE:  There was mild stenosis  TRICUSPID VALVE:  There was mild to moderate regurgitation  Pulmonary artery systolic pressure was mildly increased  HISTORY: PRIOR HISTORY: HLD, HTN, PAF, chronic CHF, thyroid nodule    PROCEDURE: The procedure was performed at the bedside  This was a routine study  The transthoracic approach was used  The study included complete 2D imaging, M-mode, complete spectral Doppler, and color Doppler  The heart rate was 101 bpm,  at the start of the study  Images were obtained from the parasternal, apical, subcostal, and suprasternal notch acoustic windows  Echocardiographic views were limited due to lung interference   This was a technically difficult study     LEFT VENTRICLE: Size was normal  Systolic function was normal  Ejection fraction was estimated to be 55 %  There were no regional wall motion abnormalities  Wall thickness was at the upper limits of normal  DOPPLER: Transmitral flow  pattern: atrial fibrillation  Left ventricular diastolic function parameters were abnormal  Doppler parameters were consistent with elevated ventricular end-diastolic filling pressure  RIGHT VENTRICLE: The size was normal  Systolic function was normal  Wall thickness was normal     LEFT ATRIUM: The atrium was mildly to moderately dilated  RIGHT ATRIUM: The atrium was mildly dilated  MITRAL VALVE: Valve structure was normal  There was normal leaflet separation  DOPPLER: The transmitral velocity was within the normal range  There was mild stenosis  There was no significant regurgitation  AORTIC VALVE: The valve was trileaflet  Leaflets exhibited normal thickness and normal cuspal separation  DOPPLER: Transaortic velocity was within the normal range  There was no evidence for stenosis  There was no significant  regurgitation  TRICUSPID VALVE: The valve structure was normal  There was normal leaflet separation  DOPPLER: The transtricuspid velocity was within the normal range  There was no evidence for stenosis  There was mild to moderate regurgitation  Pulmonary  artery systolic pressure was mildly increased  PULMONIC VALVE: Leaflets exhibited normal thickness, no calcification, and normal cuspal separation  DOPPLER: The transpulmonic velocity was within the normal range  There was no significant regurgitation  PERICARDIUM: There was no pericardial effusion  The pericardium was normal in appearance  AORTA: The root exhibited normal size  SYSTEMIC VEINS: IVC: The inferior vena cava was normal in size  PULMONARY VEINS: DOPPLER: Doppler signals were not recordable in the pulmonary vein(s)      SYSTEM MEASUREMENT TABLES    2D  %FS: 37 15 %  Ao Diam: 2 74 cm  Ao asc: 2 72 cm  EDV(Teich): 103 17 ml  EF(Teich): 67 06 %  ESV(Teich): 33 98 ml  IVSd: 0 94 cm  LA Area: 13 2 cm2  LA Diam: 3 98 cm  LVEDV MOD A4C: 32 01 ml  LVEF MOD A4C: 63 74 %  LVESV MOD A4C: 11 61 ml  LVIDd: 4 72 cm  LVIDs: 2 96 cm  LVLd A4C: 5 9 cm  LVLs A4C: 4 79 cm  LVPWd: 0 93 cm  RA Area: 8 67 cm2  RVIDd: 3 35 cm  SV MOD A4C: 20 4 ml  SV(Teich): 69 19 ml    CW  TR MaxP 39 mmHg  TR Vmax: 2 89 m/s    MM  TAPSE: 1 51 cm    [facility]    Prepared and electronically signed by    Benigno Davidson MD  Signed 2021 11:05:34       No results found for this or any previous visit  No results found for this or any previous visit  No results found for this or any previous visit  Meds/Allergies   all current active meds have been reviewed  Medications Prior to Admission   Medication    acetaminophen (TYLENOL) 650 mg CR tablet    ascorbic acid (VITAMIN C) 500 mg tablet    Cholecalciferol ([employer] VITAMIN D) 2000 UNITS CAPS    escitalopram (LEXAPRO) 5 mg tablet    ezetimibe (ZETIA) 10 mg tablet    famotidine (PEPCID) 20 mg tablet    gabapentin (NEURONTIN) 300 mg capsule    losartan (COZAAR) 50 mg tablet    metoprolol tartrate (LOPRESSOR) 25 mg tablet    sotalol (BETAPACE) 120 mg tablet    traMADol (ULTRAM) 50 mg tablet    TURMERIC PO    zolpidem (AMBIEN) 10 mg tablet    apixaban (ELIQUIS) 5 mg    Chromium Picolinate (CHROMIUM PICOLATE PO)    rOPINIRole (REQUIP) 0 25 mg tablet          Assessment:  Principal Problem:    Paroxysmal atrial fibrillation (HCC)            Counseling / Coordination of Care  Total floor / unit time spent today 25 minutes  Greater than 50% of total time was spent with the patient and / or family counseling and / or coordination of care  A description of the counseling / coordination of care: Anna Bullard

## 2021-03-13 NOTE — UTILIZATION REVIEW
Initial Clinical Review    OP/NC BED  3/12 @  1542 UPGRADED TO INPATIENT 3/13 @ 1450 FOR CONTINUED STAY WITH NEED FOR PPM PLACEMENT POSSIBLY Monday  Admission: Date/Time/Statement:   Admission Orders (From admission, onward)     Ordered        03/13/21 1450  Inpatient Admission  Once                   Orders Placed This Encounter   Procedures    Inpatient Admission     Standing Status:   Standing     Number of Occurrences:   1     Order Specific Question:   Level of Care     Answer:   Med Surg [16]     Order Specific Question:   Estimated length of stay     Answer:   More than 2 Midnights     Order Specific Question:   Certification     Answer:   I certify that inpatient services are medically necessary for this patient for a duration of greater than two midnights  See H&P and MD Progress Notes for additional information about the patient's course of treatment

## 2021-03-14 LAB
ANION GAP SERPL CALCULATED.3IONS-SCNC: 8 MMOL/L (ref 4–13)
ATRIAL RATE: 136 BPM
ATRIAL RATE: 144 BPM
BUN SERPL-MCNC: 23 MG/DL (ref 5–25)
CALCIUM SERPL-MCNC: 9.2 MG/DL (ref 8.3–10.1)
CHLORIDE SERPL-SCNC: 106 MMOL/L (ref 100–108)
CO2 SERPL-SCNC: 26 MMOL/L (ref 21–32)
CREAT SERPL-MCNC: 0.91 MG/DL (ref 0.6–1.3)
ERYTHROCYTE [DISTWIDTH] IN BLOOD BY AUTOMATED COUNT: 12.3 % (ref 11.6–15.1)
GFR SERPL CREATININE-BSD FRML MDRD: 61 ML/MIN/1.73SQ M
GLUCOSE SERPL-MCNC: 139 MG/DL (ref 65–140)
HCT VFR BLD AUTO: 41.7 % (ref 34.8–46.1)
HGB BLD-MCNC: 13.8 G/DL (ref 11.5–15.4)
MAGNESIUM SERPL-MCNC: 2 MG/DL (ref 1.6–2.6)
MCH RBC QN AUTO: 31.4 PG (ref 26.8–34.3)
MCHC RBC AUTO-ENTMCNC: 33.1 G/DL (ref 31.4–37.4)
MCV RBC AUTO: 95 FL (ref 82–98)
PLATELET # BLD AUTO: 256 THOUSANDS/UL (ref 149–390)
PMV BLD AUTO: 10.9 FL (ref 8.9–12.7)
POTASSIUM SERPL-SCNC: 3.7 MMOL/L (ref 3.5–5.3)
QRS AXIS: 55 DEGREES
QRS AXIS: 58 DEGREES
QRSD INTERVAL: 84 MS
QRSD INTERVAL: 84 MS
QT INTERVAL: 320 MS
QT INTERVAL: 330 MS
QTC INTERVAL: 497 MS
QTC INTERVAL: 518 MS
RBC # BLD AUTO: 4.39 MILLION/UL (ref 3.81–5.12)
SODIUM SERPL-SCNC: 140 MMOL/L (ref 136–145)
T WAVE AXIS: -50 DEGREES
T WAVE AXIS: -66 DEGREES
T3FREE SERPL-MCNC: 3.08 PG/ML (ref 2.3–4.2)
T4 FREE SERPL-MCNC: 1.11 NG/DL (ref 0.76–1.46)
VENTRICULAR RATE: 145 BPM
VENTRICULAR RATE: 148 BPM
WBC # BLD AUTO: 9.89 THOUSAND/UL (ref 4.31–10.16)

## 2021-03-14 PROCEDURE — 83735 ASSAY OF MAGNESIUM: CPT | Performed by: PHYSICIAN ASSISTANT

## 2021-03-14 PROCEDURE — 80048 BASIC METABOLIC PNL TOTAL CA: CPT | Performed by: PHYSICIAN ASSISTANT

## 2021-03-14 PROCEDURE — 99232 SBSQ HOSP IP/OBS MODERATE 35: CPT | Performed by: INTERNAL MEDICINE

## 2021-03-14 PROCEDURE — 85027 COMPLETE CBC AUTOMATED: CPT | Performed by: PHYSICIAN ASSISTANT

## 2021-03-14 PROCEDURE — 93010 ELECTROCARDIOGRAM REPORT: CPT | Performed by: INTERNAL MEDICINE

## 2021-03-14 RX ORDER — POTASSIUM CHLORIDE 20 MEQ/1
40 TABLET, EXTENDED RELEASE ORAL ONCE
Status: COMPLETED | OUTPATIENT
Start: 2021-03-14 | End: 2021-03-14

## 2021-03-14 RX ADMIN — APIXABAN 5 MG: 5 TABLET, FILM COATED ORAL at 17:17

## 2021-03-14 RX ADMIN — POTASSIUM CHLORIDE 40 MEQ: 1500 TABLET, EXTENDED RELEASE ORAL at 01:03

## 2021-03-14 RX ADMIN — POTASSIUM CHLORIDE 40 MEQ: 1500 TABLET, EXTENDED RELEASE ORAL at 09:50

## 2021-03-14 RX ADMIN — APIXABAN 5 MG: 5 TABLET, FILM COATED ORAL at 09:50

## 2021-03-14 RX ADMIN — DILTIAZEM HYDROCHLORIDE 5 MG/HR: 5 INJECTION INTRAVENOUS at 03:05

## 2021-03-14 RX ADMIN — ZOLPIDEM TARTRATE 10 MG: 5 TABLET, COATED ORAL at 21:05

## 2021-03-14 RX ADMIN — ACETAMINOPHEN 650 MG: 325 TABLET, FILM COATED ORAL at 22:26

## 2021-03-14 RX ADMIN — GABAPENTIN 200 MG: 100 CAPSULE ORAL at 22:27

## 2021-03-14 RX ADMIN — ESCITALOPRAM 5 MG: 5 TABLET, FILM COATED ORAL at 09:50

## 2021-03-14 RX ADMIN — DILTIAZEM HYDROCHLORIDE 5 MG/HR: 5 INJECTION INTRAVENOUS at 22:22

## 2021-03-14 RX ADMIN — OXYCODONE HYDROCHLORIDE AND ACETAMINOPHEN 500 MG: 500 TABLET ORAL at 09:51

## 2021-03-14 RX ADMIN — PANTOPRAZOLE SODIUM 40 MG: 40 TABLET, DELAYED RELEASE ORAL at 09:51

## 2021-03-14 RX ADMIN — EZETIMIBE 10 MG: 10 TABLET ORAL at 21:05

## 2021-03-14 RX ADMIN — ROPINIROLE 0.25 MG: 0.25 TABLET, FILM COATED ORAL at 22:28

## 2021-03-14 NOTE — NURSING NOTE
2025: Pt complaining of palpitations, chest pressure, and headache  /117 and pulse sustained in the 160's  EKG obtained; afib with RVR  Cardiology (Dr Cordelia Pryor) made aware    2051: Dilaudid 0 2mg and metoprolol 5mg given IV  Pt states feeling some relief of pain and pressure  /84      2104: Second dose of metoprolol given per Dr Ranjit Urbina  /95,   Pt states still feeling some palpitations, but overall feeling better  Cardiology updated and on the way to see pt   Will continue to monitor

## 2021-03-14 NOTE — QUICK NOTE
Called to bedside last night as patient was having a headache, feeling palpitations and was in Afib with RVR  Her blood pressure at the time was in the 90s and her heart rate was in the 180s  Initially she was given up to 3 doses of Lopressor 5 mg IV and 25 mg a total of p o  Lopressor  That did not control her heart rate initially remain in the 170s  Her blood pressure had improved to 130s  Controlling the blood pressure improved her symptoms  Bedside echocardiogram did not show any pericardial effusion  Patient was started on Cardizem drip which helped control her heart rate to 80s  Her daughter was notified this morning

## 2021-03-14 NOTE — PROGRESS NOTES
Cardiology Progress Note - Shola Wolff 67 y o  female MRN: [id number]    Unit/Bed#: CW2 213-02 Encounter: [id number]    Assessment and plan  1  Status post AFib ablation  2  Bradycardia with torsades due to prolonged QT  3  Hypertension  4  Hyperlipidemia    Recommendations:  She developed rapid atrial fibrillation last evening  Low-dose Cardizem seem to control her fairly well  She did have bradycardia which was significant on Friday I do not want to go too high on the AV jeremiah blocking agents  Continue low-dose Cardizem throughout the day today  NPO after midnight for pacemaker tomorrow  Replace potassium  Cardiology fellow did a bedside echo last night it did not show any pericardial fluid        Subjective:   Patient had rapid atrial fibrillation last night felt significant palpitations  Denies any anginal sounding chest pain  Denies any shortness of breath  She had some neck and back pain as well last evening but this has resolved  ROS    Objective:   Vitals: Blood pressure 102/53, pulse 70, temperature 98 2 °F (36 8 °C), temperature source Oral, resp  rate 18, height 5' 2" (1 575 m), weight 67 1 kg (148 lb), last menstrual period 1990, SpO2 96 %  , Body mass index is 27 07 kg/m² ,   Orthostatic Blood Pressures      Most Recent Value   Blood Pressure  102/53 filed at 2021 0734   Patient Position - Orthostatic VS  Lying filed at 2021 4629         Systolic (57DXZ), DL , Min:97 , FLK:043     Diastolic (75YJQ), MHL:50, Min:53, Max:117      Intake/Output Summary (Last 24 hours) at 3/14/2021 0917  Last data filed at 3/14/2021 0305  Gross per 24 hour   Intake 180 ml   Output 2300 ml   Net -2120 ml     Weight (last 2 days)     Date/Time   Weight    21 1039   67 1 (148)                Telemetry Review: No significant arrhythmias seen on telemetry review  EKG personally reviewed by Papa Rodriguez DO       Physical Exam:  Vital signs reviewed  General:  Alert and cooperative, appears stated age, no acute distress  HEENT:  PERRLA, EOMI, no scleral icterus, no conjunctival pallor  Neck:  No lymphadenopathy, no thyromegaly, no carotid bruits, no elevated JVP  Heart:  irregular rate and rhythm, normal S1/S2, no S3/S4, no murmur, rubs or gallops  PMI nondisplaced  Lungs:  Clear to auscultation bilaterally, no wheezes rales or rhonchi  Abdomen:  Soft, non-tender, positive bowel sounds, no rebound or guarding,   no organomegaly   Extremities:  Normal range of motion  No clubbing, cyanosis or edema   Vascular:  2+ pedal pulses  Skin:  No rashes or lesions on exposed skin  Neurologic:  Cranial nerves II-XII grossly intact without focal deficits  Psych:  Normal mood and affect      Laboratory Results:        CBC with diff:   Results from last 7 days   Lab Units 03/14/21 0447 03/13/21 0442 03/12/21  1044   WBC Thousand/uL 9 89 8 52 6 43   HEMOGLOBIN g/dL 13 8 13 2 13 3   HEMATOCRIT % 41 7 40 4 41 4   MCV fL 95 97 96   PLATELETS Thousands/uL 256 237 238   MCH pg 31 4 31 6 30 9   MCHC g/dL 33 1 32 7 32 1   RDW % 12 3 12 1 12 3   MPV fL 10 9 10 9 10 4   NRBC AUTO /100 WBCs  --   --  0         CMP:  Results from last 7 days   Lab Units 03/14/21 0447 03/13/21 2306 03/13/21 0442 03/12/21  1044   POTASSIUM mmol/L 3 7 3 6 4 2 4 3   CHLORIDE mmol/L 106 105 104 108   CO2 mmol/L 26 26 25 28   BUN mg/dL 23 21 22 21   CREATININE mg/dL 0 91 1 00 0 99 0 92   CALCIUM mg/dL 9 2 9 5 8 7 9 7   EGFR ml/min/1 73sq m 61 55 56 61         BMP:  Results from last 7 days   Lab Units 03/14/21 0447 03/13/21 2306 03/13/21 0442 03/12/21  1044   POTASSIUM mmol/L 3 7 3 6 4 2 4 3   CHLORIDE mmol/L 106 105 104 108   CO2 mmol/L 26 26 25 28   BUN mg/dL 23 21 22 21   CREATININE mg/dL 0 91 1 00 0 99 0 92   CALCIUM mg/dL 9 2 9 5 8 7 9 7       BNP: No results for input(s): BNP in the last 72 hours      Magnesium:   Results from last 7 days   Lab Units 03/14/21  0447 03/13/21  2306 03/13/21  0442   MAGNESIUM mg/dL 2 0 2 0 1 8       Coags:   Results from last 7 days   Lab Units 21  1044   INR  1 06       TSH:        Hemoglobin A1C       Lipid Profile:       Cardiac testing:   Results for orders placed during the hospital encounter of 21   Echo complete with contrast if indicated    Narrative [address] [facility]  [phone]    Transthoracic Echocardiogram  2D, M-mode, Doppler, and Color Doppler    Study date:  2021    Patient: Meeta Forbes  MR number: [id number]  Account number: [de-identified]  : 1945  Age: 67 years  Gender: Female  Status: Inpatient  Location: Bedside  Height: 62 in  Weight: 151 6 lb  BP: 126/ 94 mmHg    Indications: Atrial fibrillation    Diagnoses: I48 0 - Atrial fibrillation    Sonographer:  IGNACIO Ewing  Primary Physician:  Brigette Mariscal MD  Referring Physician:  Angel Urbina MD  Group:  [facility]  Interpreting Physician:  Malcolm Odom MD    SUMMARY    LEFT VENTRICLE:  Systolic function was normal  Ejection fraction was estimated to be 55 %  There were no regional wall motion abnormalities  Wall thickness was at the upper limits of normal   Doppler parameters were consistent with elevated ventricular end-diastolic filling pressure  LEFT ATRIUM:  The atrium was mildly to moderately dilated  RIGHT ATRIUM:  The atrium was mildly dilated  MITRAL VALVE:  There was mild stenosis  TRICUSPID VALVE:  There was mild to moderate regurgitation  Pulmonary artery systolic pressure was mildly increased  HISTORY: PRIOR HISTORY: HLD, HTN, PAF, chronic CHF, thyroid nodule    PROCEDURE: The procedure was performed at the bedside  This was a routine study  The transthoracic approach was used  The study included complete 2D imaging, M-mode, complete spectral Doppler, and color Doppler  The heart rate was 101 bpm,  at the start of the study   Images were obtained from the parasternal, apical, subcostal, and suprasternal notch acoustic windows  Echocardiographic views were limited due to lung interference  This was a technically difficult study  LEFT VENTRICLE: Size was normal  Systolic function was normal  Ejection fraction was estimated to be 55 %  There were no regional wall motion abnormalities  Wall thickness was at the upper limits of normal  DOPPLER: Transmitral flow  pattern: atrial fibrillation  Left ventricular diastolic function parameters were abnormal  Doppler parameters were consistent with elevated ventricular end-diastolic filling pressure  RIGHT VENTRICLE: The size was normal  Systolic function was normal  Wall thickness was normal     LEFT ATRIUM: The atrium was mildly to moderately dilated  RIGHT ATRIUM: The atrium was mildly dilated  MITRAL VALVE: Valve structure was normal  There was normal leaflet separation  DOPPLER: The transmitral velocity was within the normal range  There was mild stenosis  There was no significant regurgitation  AORTIC VALVE: The valve was trileaflet  Leaflets exhibited normal thickness and normal cuspal separation  DOPPLER: Transaortic velocity was within the normal range  There was no evidence for stenosis  There was no significant  regurgitation  TRICUSPID VALVE: The valve structure was normal  There was normal leaflet separation  DOPPLER: The transtricuspid velocity was within the normal range  There was no evidence for stenosis  There was mild to moderate regurgitation  Pulmonary  artery systolic pressure was mildly increased  PULMONIC VALVE: Leaflets exhibited normal thickness, no calcification, and normal cuspal separation  DOPPLER: The transpulmonic velocity was within the normal range  There was no significant regurgitation  PERICARDIUM: There was no pericardial effusion  The pericardium was normal in appearance  AORTA: The root exhibited normal size      SYSTEMIC VEINS: IVC: The inferior vena cava was normal in size     PULMONARY VEINS: DOPPLER: Doppler signals were not recordable in the pulmonary vein(s)  SYSTEM MEASUREMENT TABLES    2D  %FS: 37 15 %  Ao Diam: 2 74 cm  Ao asc: 2 72 cm  EDV(Teich): 103 17 ml  EF(Teich): 67 06 %  ESV(Teich): 33 98 ml  IVSd: 0 94 cm  LA Area: 13 2 cm2  LA Diam: 3 98 cm  LVEDV MOD A4C: 32 01 ml  LVEF MOD A4C: 63 74 %  LVESV MOD A4C: 11 61 ml  LVIDd: 4 72 cm  LVIDs: 2 96 cm  LVLd A4C: 5 9 cm  LVLs A4C: 4 79 cm  LVPWd: 0 93 cm  RA Area: 8 67 cm2  RVIDd: 3 35 cm  SV MOD A4C: 20 4 ml  SV(Teich): 69 19 ml    CW  TR MaxP 39 mmHg  TR Vmax: 2 89 m/s    MM  TAPSE: 1 51 cm    [facility]    Prepared and electronically signed by    Raghav Garcia MD  Signed 2021 11:05:34       No results found for this or any previous visit  No results found for this or any previous visit  No results found for this or any previous visit  Meds/Allergies   all current active meds have been reviewed  Medications Prior to Admission   Medication    acetaminophen (TYLENOL) 650 mg CR tablet    ascorbic acid (VITAMIN C) 500 mg tablet    Cholecalciferol ([employer] VITAMIN D) 2000 UNITS CAPS    escitalopram (LEXAPRO) 5 mg tablet    ezetimibe (ZETIA) 10 mg tablet    famotidine (PEPCID) 20 mg tablet    gabapentin (NEURONTIN) 300 mg capsule    losartan (COZAAR) 50 mg tablet    metoprolol tartrate (LOPRESSOR) 25 mg tablet    sotalol (BETAPACE) 120 mg tablet    traMADol (ULTRAM) 50 mg tablet    TURMERIC PO    zolpidem (AMBIEN) 10 mg tablet    apixaban (ELIQUIS) 5 mg    Chromium Picolinate (CHROMIUM PICOLATE PO)    rOPINIRole (REQUIP) 0 25 mg tablet       diltiazem, 1-15 mg/hr, Last Rate: Stopped (21 0717)      Assessment:  Principal Problem:    Paroxysmal atrial fibrillation (HCC)            Counseling / Coordination of Care  Total floor / unit time spent today 25 minutes    Greater than 50% of total time was spent with the patient and / or family counseling and / or coordination of care  A description of the counseling / coordination of care: Jeny Sutton

## 2021-03-14 NOTE — PLAN OF CARE
Problem: PAIN - ADULT  Goal: Verbalizes/displays adequate comfort level or baseline comfort level  Description: Interventions:  - Encourage patient to monitor pain and request assistance  - Assess pain using appropriate pain scale  - Administer analgesics based on type and severity of pain and evaluate response  - Implement non-pharmacological measures as appropriate and evaluate response  - Consider cultural and social influences on pain and pain management  - Notify physician/advanced practitioner if interventions unsuccessful or patient reports new pain  Outcome: Progressing     Problem: INFECTION - ADULT  Goal: Absence or prevention of progression during hospitalization  Description: INTERVENTIONS:  - Assess and monitor for signs and symptoms of infection  - Monitor lab/diagnostic results  - Monitor all insertion sites, i e  indwelling lines, tubes, and drains  - Monitor endotracheal if appropriate and nasal secretions for changes in amount and color  - [place] appropriate cooling/warming therapies per order  - Administer medications as ordered  - Instruct and encourage patient and family to use good hand hygiene technique  - Identify and instruct in appropriate isolation precautions for identified infection/condition  Outcome: Progressing  Goal: Absence of fever/infection during neutropenic period  Description: INTERVENTIONS:  - Monitor WBC    Outcome: Progressing     Problem: SAFETY ADULT  Goal: Patient will remain free of falls  Description: INTERVENTIONS:  - Assess patient frequently for physical needs  -  Identify cognitive and physical deficits and behaviors that affect risk of falls    -  [place] fall precautions as indicated by assessment   - Educate patient/family on patient safety including physical limitations  - Instruct patient to call for assistance with activity based on assessment  - Modify environment to reduce risk of injury  - Consider OT/PT consult to assist with strengthening/mobility  Outcome: Progressing  Goal: Maintain or return to baseline ADL function  Description: INTERVENTIONS:  -  Assess patient's ability to carry out ADLs; assess patient's baseline for ADL function and identify physical deficits which impact ability to perform ADLs (bathing, care of mouth/teeth, toileting, grooming, dressing, etc )  - Assess/evaluate cause of self-care deficits   - Assess range of motion  - Assess patient's mobility; develop plan if impaired  - Assess patient's need for assistive devices and provide as appropriate  - Encourage maximum independence but intervene and supervise when necessary  - Involve family in performance of ADLs  - Assess for home care needs following discharge   - Consider OT consult to assist with ADL evaluation and planning for discharge  - Provide patient education as appropriate  Outcome: Progressing  Goal: Maintain or return mobility status to optimal level  Description: INTERVENTIONS:  - Assess patient's baseline mobility status (ambulation, transfers, stairs, etc )    - Identify cognitive and physical deficits and behaviors that affect mobility  - Identify mobility aids required to assist with transfers and/or ambulation (gait belt, sit-to-stand, lift, walker, cane, etc )  - [place] fall precautions as indicated by assessment  - Record patient progress and toleration of activity level on Mobility SBAR; progress patient to next Phase/Stage  - Instruct patient to call for assistance with activity based on assessment  - Consider rehabilitation consult to assist with strengthening/weightbearing, etc   Outcome: Progressing     Problem: DISCHARGE PLANNING  Goal: Discharge to home or other facility with appropriate resources  Description: INTERVENTIONS:  - Identify barriers to discharge w/patient and caregiver  - Arrange for needed discharge resources and transportation as appropriate  - Identify discharge learning needs (meds, wound care, etc )  - Arrange for interpretive services to assist at discharge as needed  - Refer to Case Management Department for coordinating discharge planning if the patient needs post-hospital services based on physician/advanced practitioner order or complex needs related to functional status, cognitive ability, or social support system  Outcome: Progressing     Problem: Knowledge Deficit  Goal: Patient/family/caregiver demonstrates understanding of disease process, treatment plan, medications, and discharge instructions  Description: Complete learning assessment and assess knowledge base    Interventions:  - Provide teaching at level of understanding  - Provide teaching via preferred learning methods  Outcome: Progressing

## 2021-03-14 NOTE — CASE MANAGEMENT
LOS: Day 1  Bundle: pt is a Cardiac arrhythmia Bundle  Readmission risk: pt is not a 30 day readmit    CM reviewed role with pt  Pt reported her dtr Hali Upton as the emergency contact ([phone])  Pt reported that she lives in a 1 level home with 2 CHIN to enter and a ramp in the front  Pt reported that she was IPTA with ADLs, drives, does not works  Has a walker, cane, w/c from spouse  Pt reported a hx of VNA, could not recall the name of the agnecy, pt reported a hx of OPPT, cannot recall name at this time  Denied hx of STR  PCP is Dr Rosario Noel; pharmacy of choice is [facility]  No reported hx of MH or substance abuse or MH  Dtr Hali Upton is POA; pt has a LW  CM reviewed d/c planning process including the following: identifying help at home, patient preference for d/c planning needs, Discharge Lounge, [facility] Meds to Bed program, availability of treatment team to discuss questions or concerns patient and/or family may have regarding understanding medications and recognizing signs and symptoms once discharged  CM also encouraged patient to follow up with all recommended appointments after discharge  Patient advised of importance for patient and family to participate in managing patients medical well being  Patient/caregiver received discharge checklist  Content reviewed  Patient/caregiver encouraged to participate in discharge plan of care prior to discharge home

## 2021-03-15 ENCOUNTER — APPOINTMENT (INPATIENT)
Dept: NON INVASIVE DIAGNOSTICS | Facility: HOSPITAL | Age: 76
DRG: 243 | End: 2021-03-15
Payer: MEDICARE

## 2021-03-15 ENCOUNTER — APPOINTMENT (INPATIENT)
Dept: RADIOLOGY | Facility: HOSPITAL | Age: 76
DRG: 243 | End: 2021-03-15
Payer: MEDICARE

## 2021-03-15 LAB
ANION GAP SERPL CALCULATED.3IONS-SCNC: 8 MMOL/L (ref 4–13)
ATRIAL RATE: 99 BPM
BASOPHILS # BLD AUTO: 0.07 THOUSANDS/ΜL (ref 0–0.1)
BASOPHILS NFR BLD AUTO: 1 % (ref 0–1)
BUN SERPL-MCNC: 23 MG/DL (ref 5–25)
CALCIUM SERPL-MCNC: 9.2 MG/DL (ref 8.3–10.1)
CHLORIDE SERPL-SCNC: 107 MMOL/L (ref 100–108)
CO2 SERPL-SCNC: 25 MMOL/L (ref 21–32)
CREAT SERPL-MCNC: 0.92 MG/DL (ref 0.6–1.3)
EOSINOPHIL # BLD AUTO: 0.35 THOUSAND/ΜL (ref 0–0.61)
EOSINOPHIL NFR BLD AUTO: 4 % (ref 0–6)
ERYTHROCYTE [DISTWIDTH] IN BLOOD BY AUTOMATED COUNT: 12.4 % (ref 11.6–15.1)
GFR SERPL CREATININE-BSD FRML MDRD: 61 ML/MIN/1.73SQ M
GLUCOSE SERPL-MCNC: 116 MG/DL (ref 65–140)
HCT VFR BLD AUTO: 41.9 % (ref 34.8–46.1)
HGB BLD-MCNC: 13.7 G/DL (ref 11.5–15.4)
IMM GRANULOCYTES # BLD AUTO: 0.02 THOUSAND/UL (ref 0–0.2)
IMM GRANULOCYTES NFR BLD AUTO: 0 % (ref 0–2)
LYMPHOCYTES # BLD AUTO: 2.9 THOUSANDS/ΜL (ref 0.6–4.47)
LYMPHOCYTES NFR BLD AUTO: 35 % (ref 14–44)
MAGNESIUM SERPL-MCNC: 2.1 MG/DL (ref 1.6–2.6)
MCH RBC QN AUTO: 31.4 PG (ref 26.8–34.3)
MCHC RBC AUTO-ENTMCNC: 32.7 G/DL (ref 31.4–37.4)
MCV RBC AUTO: 96 FL (ref 82–98)
MONOCYTES # BLD AUTO: 0.97 THOUSAND/ΜL (ref 0.17–1.22)
MONOCYTES NFR BLD AUTO: 12 % (ref 4–12)
NEUTROPHILS # BLD AUTO: 4.05 THOUSANDS/ΜL (ref 1.85–7.62)
NEUTS SEG NFR BLD AUTO: 48 % (ref 43–75)
NRBC BLD AUTO-RTO: 0 /100 WBCS
PLATELET # BLD AUTO: 245 THOUSANDS/UL (ref 149–390)
PMV BLD AUTO: 11 FL (ref 8.9–12.7)
POTASSIUM SERPL-SCNC: 4.1 MMOL/L (ref 3.5–5.3)
PR INTERVAL: 206 MS
QRS AXIS: 57 DEGREES
QRSD INTERVAL: 86 MS
QT INTERVAL: 408 MS
QTC INTERVAL: 470 MS
RBC # BLD AUTO: 4.36 MILLION/UL (ref 3.81–5.12)
SODIUM SERPL-SCNC: 140 MMOL/L (ref 136–145)
T WAVE AXIS: 9 DEGREES
VENTRICULAR RATE: 80 BPM
WBC # BLD AUTO: 8.36 THOUSAND/UL (ref 4.31–10.16)

## 2021-03-15 PROCEDURE — NC001 PR NO CHARGE: Performed by: INTERNAL MEDICINE

## 2021-03-15 PROCEDURE — 93010 ELECTROCARDIOGRAM REPORT: CPT | Performed by: INTERNAL MEDICINE

## 2021-03-15 PROCEDURE — 02HK3JZ INSERTION OF PACEMAKER LEAD INTO RIGHT VENTRICLE, PERCUTANEOUS APPROACH: ICD-10-PCS | Performed by: INTERNAL MEDICINE

## 2021-03-15 PROCEDURE — C1785 PMKR, DUAL, RATE-RESP: HCPCS

## 2021-03-15 PROCEDURE — 92960 CARDIOVERSION ELECTRIC EXT: CPT | Performed by: INTERNAL MEDICINE

## 2021-03-15 PROCEDURE — C1769 GUIDE WIRE: HCPCS

## 2021-03-15 PROCEDURE — C1892 INTRO/SHEATH,FIXED,PEEL-AWAY: HCPCS

## 2021-03-15 PROCEDURE — 83735 ASSAY OF MAGNESIUM: CPT | Performed by: PHYSICIAN ASSISTANT

## 2021-03-15 PROCEDURE — C1898 LEAD, PMKR, OTHER THAN TRANS: HCPCS

## 2021-03-15 PROCEDURE — 80048 BASIC METABOLIC PNL TOTAL CA: CPT | Performed by: PHYSICIAN ASSISTANT

## 2021-03-15 PROCEDURE — 0JH606Z INSERTION OF PACEMAKER, DUAL CHAMBER INTO CHEST SUBCUTANEOUS TISSUE AND FASCIA, OPEN APPROACH: ICD-10-PCS | Performed by: INTERNAL MEDICINE

## 2021-03-15 PROCEDURE — 93005 ELECTROCARDIOGRAM TRACING: CPT

## 2021-03-15 PROCEDURE — 71045 X-RAY EXAM CHEST 1 VIEW: CPT

## 2021-03-15 PROCEDURE — 33208 INSRT HEART PM ATRIAL & VENT: CPT | Performed by: INTERNAL MEDICINE

## 2021-03-15 PROCEDURE — 33208 INSRT HEART PM ATRIAL & VENT: CPT

## 2021-03-15 PROCEDURE — 85025 COMPLETE CBC W/AUTO DIFF WBC: CPT | Performed by: INTERNAL MEDICINE

## 2021-03-15 PROCEDURE — 02H63JZ INSERTION OF PACEMAKER LEAD INTO RIGHT ATRIUM, PERCUTANEOUS APPROACH: ICD-10-PCS | Performed by: INTERNAL MEDICINE

## 2021-03-15 PROCEDURE — 92960 CARDIOVERSION ELECTRIC EXT: CPT

## 2021-03-15 RX ORDER — LIDOCAINE HYDROCHLORIDE 10 MG/ML
INJECTION, SOLUTION EPIDURAL; INFILTRATION; INTRACAUDAL; PERINEURAL AS NEEDED
Status: DISCONTINUED | OUTPATIENT
Start: 2021-03-15 | End: 2021-03-15

## 2021-03-15 RX ORDER — SODIUM CHLORIDE 9 MG/ML
INJECTION, SOLUTION INTRAVENOUS CONTINUOUS PRN
Status: DISCONTINUED | OUTPATIENT
Start: 2021-03-15 | End: 2021-03-15

## 2021-03-15 RX ORDER — PROPOFOL 10 MG/ML
INJECTION, EMULSION INTRAVENOUS AS NEEDED
Status: DISCONTINUED | OUTPATIENT
Start: 2021-03-15 | End: 2021-03-15

## 2021-03-15 RX ORDER — PROPOFOL 10 MG/ML
INJECTION, EMULSION INTRAVENOUS CONTINUOUS PRN
Status: DISCONTINUED | OUTPATIENT
Start: 2021-03-15 | End: 2021-03-15

## 2021-03-15 RX ORDER — FENTANYL CITRATE 50 UG/ML
INJECTION, SOLUTION INTRAMUSCULAR; INTRAVENOUS AS NEEDED
Status: DISCONTINUED | OUTPATIENT
Start: 2021-03-15 | End: 2021-03-15

## 2021-03-15 RX ORDER — AMIODARONE HYDROCHLORIDE 50 MG/ML
INJECTION, SOLUTION INTRAVENOUS AS NEEDED
Status: DISCONTINUED | OUTPATIENT
Start: 2021-03-15 | End: 2021-03-15

## 2021-03-15 RX ORDER — FLECAINIDE ACETATE 100 MG/1
100 TABLET ORAL EVERY 12 HOURS SCHEDULED
Status: DISCONTINUED | OUTPATIENT
Start: 2021-03-15 | End: 2021-03-16 | Stop reason: HOSPADM

## 2021-03-15 RX ORDER — KETAMINE HYDROCHLORIDE 50 MG/ML
INJECTION, SOLUTION, CONCENTRATE INTRAMUSCULAR; INTRAVENOUS AS NEEDED
Status: DISCONTINUED | OUTPATIENT
Start: 2021-03-15 | End: 2021-03-15

## 2021-03-15 RX ORDER — GENTAMICIN SULFATE 40 MG/ML
INJECTION, SOLUTION INTRAMUSCULAR; INTRAVENOUS CODE/TRAUMA/SEDATION MEDICATION
Status: COMPLETED | OUTPATIENT
Start: 2021-03-15 | End: 2021-03-15

## 2021-03-15 RX ORDER — METOPROLOL SUCCINATE 25 MG/1
25 TABLET, EXTENDED RELEASE ORAL DAILY
Status: DISCONTINUED | OUTPATIENT
Start: 2021-03-15 | End: 2021-03-16 | Stop reason: HOSPADM

## 2021-03-15 RX ORDER — LIDOCAINE HYDROCHLORIDE 10 MG/ML
INJECTION, SOLUTION EPIDURAL; INFILTRATION; INTRACAUDAL; PERINEURAL CODE/TRAUMA/SEDATION MEDICATION
Status: COMPLETED | OUTPATIENT
Start: 2021-03-15 | End: 2021-03-15

## 2021-03-15 RX ADMIN — PHENYLEPHRINE HYDROCHLORIDE 200 MCG: 10 INJECTION INTRAVENOUS at 11:26

## 2021-03-15 RX ADMIN — IOHEXOL 20 ML: 350 INJECTION, SOLUTION INTRAVENOUS at 11:02

## 2021-03-15 RX ADMIN — ESCITALOPRAM 5 MG: 5 TABLET, FILM COATED ORAL at 12:18

## 2021-03-15 RX ADMIN — PROPOFOL 40 MG: 10 INJECTION, EMULSION INTRAVENOUS at 11:17

## 2021-03-15 RX ADMIN — PROPOFOL 60 MCG/KG/MIN: 10 INJECTION, EMULSION INTRAVENOUS at 10:41

## 2021-03-15 RX ADMIN — SODIUM CHLORIDE: 9 INJECTION, SOLUTION INTRAVENOUS at 10:26

## 2021-03-15 RX ADMIN — OXYCODONE HYDROCHLORIDE AND ACETAMINOPHEN 500 MG: 500 TABLET ORAL at 12:18

## 2021-03-15 RX ADMIN — EZETIMIBE 10 MG: 10 TABLET ORAL at 21:25

## 2021-03-15 RX ADMIN — PHENYLEPHRINE HYDROCHLORIDE 200 MCG: 10 INJECTION INTRAVENOUS at 11:19

## 2021-03-15 RX ADMIN — PHENYLEPHRINE HYDROCHLORIDE 100 MCG: 10 INJECTION INTRAVENOUS at 11:35

## 2021-03-15 RX ADMIN — LIDOCAINE HYDROCHLORIDE 50 MG: 10 INJECTION, SOLUTION EPIDURAL; INFILTRATION; INTRACAUDAL; PERINEURAL at 10:41

## 2021-03-15 RX ADMIN — PHENYLEPHRINE HYDROCHLORIDE 100 MCG: 10 INJECTION INTRAVENOUS at 11:03

## 2021-03-15 RX ADMIN — GENTAMICIN SULFATE 80 MG: 40 INJECTION, SOLUTION INTRAMUSCULAR; INTRAVENOUS at 11:29

## 2021-03-15 RX ADMIN — FENTANYL CITRATE 25 MCG: 50 INJECTION INTRAMUSCULAR; INTRAVENOUS at 10:41

## 2021-03-15 RX ADMIN — KETAMINE HYDROCHLORIDE 20 MG: 50 INJECTION, SOLUTION INTRAMUSCULAR; INTRAVENOUS at 10:41

## 2021-03-15 RX ADMIN — LIDOCAINE HYDROCHLORIDE 30 ML: 10 INJECTION, SOLUTION EPIDURAL; INFILTRATION; INTRACAUDAL; PERINEURAL at 10:57

## 2021-03-15 RX ADMIN — AMIODARONE HYDROCHLORIDE 150 MG: 50 INJECTION, SOLUTION INTRAVENOUS at 11:13

## 2021-03-15 RX ADMIN — PANTOPRAZOLE SODIUM 40 MG: 40 TABLET, DELAYED RELEASE ORAL at 12:18

## 2021-03-15 RX ADMIN — ROPINIROLE 0.25 MG: 0.25 TABLET, FILM COATED ORAL at 21:24

## 2021-03-15 RX ADMIN — VANCOMYCIN HYDROCHLORIDE 1000 MG: 1 INJECTION, SOLUTION INTRAVENOUS at 10:28

## 2021-03-15 RX ADMIN — PHENYLEPHRINE HYDROCHLORIDE 200 MCG: 10 INJECTION INTRAVENOUS at 11:36

## 2021-03-15 RX ADMIN — LOSARTAN POTASSIUM 50 MG: 50 TABLET, FILM COATED ORAL at 08:51

## 2021-03-15 RX ADMIN — GABAPENTIN 200 MG: 100 CAPSULE ORAL at 21:25

## 2021-03-15 RX ADMIN — ACETAMINOPHEN 650 MG: 325 TABLET, FILM COATED ORAL at 19:58

## 2021-03-15 RX ADMIN — APIXABAN 5 MG: 5 TABLET, FILM COATED ORAL at 17:08

## 2021-03-15 RX ADMIN — ACETAMINOPHEN 650 MG: 325 TABLET, FILM COATED ORAL at 13:28

## 2021-03-15 RX ADMIN — METOPROLOL SUCCINATE 25 MG: 25 TABLET, EXTENDED RELEASE ORAL at 17:07

## 2021-03-15 RX ADMIN — FENTANYL CITRATE 25 MCG: 50 INJECTION INTRAMUSCULAR; INTRAVENOUS at 10:54

## 2021-03-15 RX ADMIN — FLECAINIDE ACETATE 100 MG: 100 TABLET ORAL at 21:24

## 2021-03-15 RX ADMIN — ZOLPIDEM TARTRATE 10 MG: 5 TABLET, COATED ORAL at 21:24

## 2021-03-15 NOTE — ANESTHESIA PREPROCEDURE EVALUATION
EF50%    Medical History    History Comments   Arthritis    Hypertension    Atrial fibrillation with rapid ventricular response (HCC) last assessed - [id number]   Benign colon polyp last assessed - [id number]   Effusion of knee joint right Resolved - [id number]   Actinic keratosis last assessed - [id number]   Esophageal reflux    Peroneal tendonitis, right last assessed - [id number]   Palpitations last assessed - [id number]   Fibromyalgia last assessed - [id number]   Fibromyalgia, primary    Basal cell carcinoma    Trochanteric bursitis of left hip    Right lumbar radiculopathy    GERD (gastroesophageal reflux disease)    Disease of thyroid gland    Thyroid nodule      Procedure:  CARDIAC EPS/PACER IMPLANT    Relevant Problems   ANESTHESIA (within normal limits)      CARDIO   (+) Essential hypertension   (+) Hyperlipidemia   (+) Paroxysmal atrial fibrillation (HCC)      MUSCULOSKELETAL   (+) Arthritis of both acromioclavicular joints   (+) Fibromyalgia   (+) Lumbar spondylosis   (+) Osteoarthritis of knee   (+) Osteoarthritis of shoulder   (+) Osteoarthrosis, hand      NEURO/PSYCH   (+) Fibromyalgia        Physical Exam    Airway    Mallampati score: II  TM Distance: >3 FB  Neck ROM: full     Dental   No notable dental hx     Cardiovascular  Rhythm: regular, Rate: normal,     Pulmonary  Pulmonary exam normal     Other Findings  Episode of tachycardia overnight      Anesthesia Plan  ASA Score- 3     Anesthesia Type- IV sedation with anesthesia with ASA Monitors  Additional Monitors:   Airway Plan:     Comment: Per patient, appropriately NPO, denies active CP/SOB/wheezing/symptoms related to heartburn/nausea/vomiting    Plan Factors-Exercise tolerance (METS): >4 METS  Chart reviewed  EKG reviewed  Existing labs reviewed  Patient summary reviewed  Patient is not a current smoker  Induction- intravenous  Postoperative Plan- Plan for postoperative opioid use       Informed Consent- Anesthetic plan and risks discussed with patient  I personally reviewed this patient with the CRNA  Discussed and agreed on the Anesthesia Plan with the SREE Ochoa

## 2021-03-15 NOTE — PROCEDURES
PPM - His lead in LPF region    History and physical were reviewed  Patient was examined and history was reviewed  No change in patient's condition Since history and physical has been completed        The pre- operative diagnosis:  Sick sinus syndrome  Tachy-angela syndrome  Bradycardia  PAF - post ablation  Torsades/VF  secondary to prolonged QT requiring shock, on day of ablation    Hypertension  Hyperlipidemia      Postoperative diagnosis:  Sick sinus syndrome  Tachy-angela syndrome  Bradycardia  PAF - post ablation  Torsades/VF  secondary to prolonged QT requiring shock, on day of ablation    Hypertension  Hyperlipidemia        Procedure:  1  Dual chamber PPM  RA lead   RV lead - His lead - with LPF capture     2  DCCV - converted AFib to sinus rhythm        Surgeon: [id number] -none    Specimens - none    Estimated blood loss- 10 ml    Findings-none    Complications none    Anesthesia-  Anesthesia by anaesthesiologist , local lidocaine by myself      Details of the device                Description of procedure: The patient was seen before the procedure  The details of the procedure was explained and patient agreed to the same  Appropriate consent was signed  The patient was brought to the electrophysiologic laboratory  Proper time out was done  Sterile dressing and draping was done  Local lidocaine was infiltrated, In the infraclavicular region at the site of device implant  Initial incision was made by a sharp number 10 blade  Thereafter electrocautery and blunt dissection was used to form a prepectoral pocket  Appropriate hemostasis was taken care of      20 mL of radiocontrast, followed by 20 mL of saline, was injected in a peripheral vein on the side of the implant  Under direct visualization, the axillary vein was  Punctured,extra-thoracic  A single guidewire was inserted, and taking all the way to the inferior vena cava to confirm that it is on the right side   We also confirmed by the left anterior oblique view that the wire is in the right side  Thereafter a second access was obtained using the fluoroscopic image of the venogram as a roadmap  Wire was once again taken to the inferior vena cava, and position checked in Austrian views To confirm the appropriate position  A 7F sheath passed over first wire  The atrial lead was placed in the ventricle for back up pacing if needed  A 7F sheath was passed over the second wire  A His sheath and lead was passed through the sheath  Mapping of His bundle done  Then went distally and inferiorly and left posterior fascicle (LPF) was mapped   Position of the lead was confirmed in both [place] and [place] views Appropriate sensing and thresholds were noted  Lead was screwed in LPF position with non selective capture  The sheath was slit and removed  Once again the lead was tested for appropriate sensing, impedance and threshold  The lead was tied down to the prepectoral fascia and muscle  The patient was paced from the LPF  for second part of procedure       The atrial lead in the ventricle was pulled back into the atrium  Wth the curved stylet in it, was maneuvered into the right atrial appendage  Appropriate sensing and thresholds were noted  The lead was screwed in  The sheath was removed  The lead was sutured to the pectoral muscle and fascia      DCCV - Single shock, Biphasic, 300 joules, synchronized  Patient went back from AFib to sinus rhythm      The pocket was washed with large amounts of antibiotic saline  Appropriate hemostasis was taken care of  The lead was connected to the generator  The generator and leads were placed inside the pocket  The generator was tied down  Thereafter the device was interrogated and proper sensing and thresholds through the device were confirmed  The pocket was closed in 3 separate layers with intermittent sutures     Dressing was done with Steri-Strips, Aquacell          Summary of the procedure: The patient came in to the laboratory for dual -chamber device placement  The device has been placed and patient tolerated the procedure well      Recommendation  Closely follow the site as patient is on Eliquis  Start flecainide 100 mg b i d    Start low-dose beta-blocker now metoprolol succinate 25 mg per day

## 2021-03-15 NOTE — PLAN OF CARE
Problem: PAIN - ADULT  Goal: Verbalizes/displays adequate comfort level or baseline comfort level  Description: Interventions:  - Encourage patient to monitor pain and request assistance  - Assess pain using appropriate pain scale  - Administer analgesics based on type and severity of pain and evaluate response  - Implement non-pharmacological measures as appropriate and evaluate response  - Notify physician/advanced practitioner if interventions unsuccessful or patient reports new pain  Outcome: Progressing     Problem: INFECTION - ADULT  Goal: Absence or prevention of progression during hospitalization  Description: INTERVENTIONS:  - Assess and monitor for signs and symptoms of infection  - Monitor lab/diagnostic results  - Monitor all insertion sites, i e  indwelling lines, tubes, and drains  - [place] appropriate cooling/warming therapies per order  - Administer medications as ordered  - Instruct and encourage patient and family to use good hand hygiene technique  - Identify and instruct in appropriate isolation precautions for identified infection/condition  Outcome: Progressing     Problem: SAFETY ADULT  Goal: Patient will remain free of falls  Description: INTERVENTIONS:  - Assess patient frequently for physical needs  -  Identify cognitive and physical deficits and behaviors that affect risk of falls    -  [place] fall precautions as indicated by assessment   - Educate patient/family on patient safety including physical limitations  - Instruct patient to call for assistance with activity based on assessment  - Modify environment to reduce risk of injury  - Consider OT/PT consult to assist with strengthening/mobility  Outcome: Progressing  Goal: Maintain or return to baseline ADL function  Description: INTERVENTIONS:  -  Assess patient's ability to carry out ADLs; assess patient's baseline for ADL function and identify physical deficits which impact ability to perform ADLs (bathing, care of mouth/teeth, toileting, grooming, dressing, etc )  - Assess/evaluate cause of self-care deficits   - Assess range of motion  - Assess patient's mobility; develop plan if impaired  - Assess patient's need for assistive devices and provide as appropriate  - Encourage maximum independence but intervene and supervise when necessary  - Involve family in performance of ADLs  - Assess for home care needs following discharge   - Consider OT consult to assist with ADL evaluation and planning for discharge  - Provide patient education as appropriate  Outcome: Progressing  Goal: Maintain or return mobility status to optimal level  Description: INTERVENTIONS:  - Assess patient's baseline mobility status (ambulation, transfers, stairs, etc )    - Identify cognitive and physical deficits and behaviors that affect mobility  - Identify mobility aids required to assist with transfers and/or ambulation (gait belt, sit-to-stand, lift, walker, cane, etc )  - [place] fall precautions as indicated by assessment  - Record patient progress and toleration of activity level on Mobility SBAR; progress patient to next Phase/Stage  - Instruct patient to call for assistance with activity based on assessment  - Consider rehabilitation consult to assist with strengthening/weightbearing, etc   Outcome: Progressing     Problem: DISCHARGE PLANNING  Goal: Discharge to home or other facility with appropriate resources  Description: INTERVENTIONS:  - Identify barriers to discharge w/patient and caregiver  - Arrange for needed discharge resources and transportation as appropriate  - Identify discharge learning needs (meds, wound care, etc )  - Refer to Case Management Department for coordinating discharge planning if the patient needs post-hospital services based on physician/advanced practitioner order or complex needs related to functional status, cognitive ability, or social support system  Outcome: Progressing     Problem: Knowledge Deficit  Goal: Patient/family/caregiver demonstrates understanding of disease process, treatment plan, medications, and discharge instructions  Description: Complete learning assessment and assess knowledge base    Interventions:  - Provide teaching at level of understanding  - Provide teaching via preferred learning methods  Outcome: Progressing     Problem: CARDIOVASCULAR - ADULT  Goal: Maintains optimal cardiac output and hemodynamic stability  Description: INTERVENTIONS:  - Monitor I/O, vital signs and rhythm  - Monitor for S/S and trends of decreased cardiac output  - Administer and titrate ordered vasoactive medications to optimize hemodynamic stability  - Assess quality of pulses, skin color and temperature  - Assess for signs of decreased coronary artery perfusion  - Instruct patient to report change in severity of symptoms  Outcome: Progressing

## 2021-03-15 NOTE — ANESTHESIA POSTPROCEDURE EVALUATION
Post-Op Assessment Note    CV Status:  Stable    Pain management: adequate     Mental Status:  Alert and awake   Hydration Status:  Euvolemic   PONV Controlled:  Controlled   Airway Patency:  Patent      Post Op Vitals Reviewed: Yes      Staff: CRNA         No complications documented      BP   116/62   Temp      Pulse 78   Resp   16   SpO2   99

## 2021-03-15 NOTE — NURSING NOTE
Patient continue AFIB on monitor 's, currently on Cardizem drip at 5 mg/hr  She is NPO for pace maker today

## 2021-03-16 ENCOUNTER — APPOINTMENT (INPATIENT)
Dept: RADIOLOGY | Facility: HOSPITAL | Age: 76
DRG: 243 | End: 2021-03-16
Payer: MEDICARE

## 2021-03-16 VITALS
BODY MASS INDEX: 27.23 KG/M2 | OXYGEN SATURATION: 97 % | HEIGHT: 62 IN | TEMPERATURE: 97.9 F | HEART RATE: 82 BPM | SYSTOLIC BLOOD PRESSURE: 141 MMHG | DIASTOLIC BLOOD PRESSURE: 81 MMHG | WEIGHT: 148 LBS | RESPIRATION RATE: 18 BRPM

## 2021-03-16 LAB
ANION GAP SERPL CALCULATED.3IONS-SCNC: 7 MMOL/L (ref 4–13)
ATRIAL RATE: 57 BPM
ATRIAL RATE: 80 BPM
BUN SERPL-MCNC: 22 MG/DL (ref 5–25)
CALCIUM SERPL-MCNC: 9.5 MG/DL (ref 8.3–10.1)
CHLORIDE SERPL-SCNC: 104 MMOL/L (ref 100–108)
CO2 SERPL-SCNC: 25 MMOL/L (ref 21–32)
CREAT SERPL-MCNC: 0.96 MG/DL (ref 0.6–1.3)
ERYTHROCYTE [DISTWIDTH] IN BLOOD BY AUTOMATED COUNT: 12.1 % (ref 11.6–15.1)
GFR SERPL CREATININE-BSD FRML MDRD: 58 ML/MIN/1.73SQ M
GLUCOSE SERPL-MCNC: 117 MG/DL (ref 65–140)
HCT VFR BLD AUTO: 43.2 % (ref 34.8–46.1)
HGB BLD-MCNC: 14 G/DL (ref 11.5–15.4)
MCH RBC QN AUTO: 31.3 PG (ref 26.8–34.3)
MCHC RBC AUTO-ENTMCNC: 32.4 G/DL (ref 31.4–37.4)
MCV RBC AUTO: 97 FL (ref 82–98)
P AXIS: 51 DEGREES
P AXIS: 81 DEGREES
PLATELET # BLD AUTO: 232 THOUSANDS/UL (ref 149–390)
PMV BLD AUTO: 10.7 FL (ref 8.9–12.7)
POTASSIUM SERPL-SCNC: 4.3 MMOL/L (ref 3.5–5.3)
PR INTERVAL: 175 MS
PR INTERVAL: 218 MS
QRS AXIS: 29 DEGREES
QRS AXIS: 83 DEGREES
QRSD INTERVAL: 106 MS
QRSD INTERVAL: 92 MS
QT INTERVAL: 426 MS
QT INTERVAL: 438 MS
QTC INTERVAL: 427 MS
QTC INTERVAL: 491 MS
RBC # BLD AUTO: 4.47 MILLION/UL (ref 3.81–5.12)
SODIUM SERPL-SCNC: 136 MMOL/L (ref 136–145)
T WAVE AXIS: 12 DEGREES
T WAVE AXIS: 33 DEGREES
VENTRICULAR RATE: 57 BPM
VENTRICULAR RATE: 80 BPM
WBC # BLD AUTO: 7.61 THOUSAND/UL (ref 4.31–10.16)

## 2021-03-16 PROCEDURE — 93010 ELECTROCARDIOGRAM REPORT: CPT | Performed by: INTERNAL MEDICINE

## 2021-03-16 PROCEDURE — 99024 POSTOP FOLLOW-UP VISIT: CPT | Performed by: PHYSICIAN ASSISTANT

## 2021-03-16 PROCEDURE — 71046 X-RAY EXAM CHEST 2 VIEWS: CPT

## 2021-03-16 PROCEDURE — 85027 COMPLETE CBC AUTOMATED: CPT | Performed by: PHYSICIAN ASSISTANT

## 2021-03-16 PROCEDURE — 80048 BASIC METABOLIC PNL TOTAL CA: CPT | Performed by: PHYSICIAN ASSISTANT

## 2021-03-16 PROCEDURE — 93005 ELECTROCARDIOGRAM TRACING: CPT

## 2021-03-16 RX ORDER — METOPROLOL SUCCINATE 25 MG/1
25 TABLET, EXTENDED RELEASE ORAL DAILY
Qty: 30 TABLET | Refills: 3 | Status: SHIPPED | OUTPATIENT
Start: 2021-03-17 | End: 2021-03-26 | Stop reason: SDUPTHER

## 2021-03-16 RX ORDER — FLECAINIDE ACETATE 100 MG/1
100 TABLET ORAL EVERY 12 HOURS SCHEDULED
Qty: 60 TABLET | Refills: 3 | Status: SHIPPED | OUTPATIENT
Start: 2021-03-16 | End: 2021-04-19

## 2021-03-16 RX ADMIN — LOSARTAN POTASSIUM 50 MG: 50 TABLET, FILM COATED ORAL at 08:50

## 2021-03-16 RX ADMIN — APIXABAN 5 MG: 5 TABLET, FILM COATED ORAL at 08:50

## 2021-03-16 RX ADMIN — ESCITALOPRAM 5 MG: 5 TABLET, FILM COATED ORAL at 08:50

## 2021-03-16 RX ADMIN — METOPROLOL SUCCINATE 25 MG: 25 TABLET, EXTENDED RELEASE ORAL at 08:50

## 2021-03-16 RX ADMIN — OXYCODONE HYDROCHLORIDE AND ACETAMINOPHEN 500 MG: 500 TABLET ORAL at 08:50

## 2021-03-16 RX ADMIN — FLECAINIDE ACETATE 100 MG: 100 TABLET ORAL at 08:54

## 2021-03-16 RX ADMIN — PANTOPRAZOLE SODIUM 40 MG: 40 TABLET, DELAYED RELEASE ORAL at 08:50

## 2021-03-16 RX ADMIN — ACETAMINOPHEN 650 MG: 325 TABLET, FILM COATED ORAL at 11:18

## 2021-03-16 NOTE — DISCHARGE SUMMARY
Discharge Summary - Cesar Sandy 67 y o  female MRN: [id number]    Unit/Bed#: [facility] 213-02 Encounter: [id number]      Admission Date: 3/12/2021   Discharge Date:  3/16/2021    Discharge Diagnosis:   1  Symptomatic paroxysmal atrial fibrillation, status post cryoablation with pulmonary vein isolation 3/12/2021   A ) started on flecainide in the post ablation setting (previously on sotalol, discontinued due to prolonged QT and bradycardia resulting in torsades)   B ) maintained on Eliquis anticoagulation  2  Sick sinus syndrome with bradycardia induced torsades, status post Medtronic dual-chamber pacemaker implantation 3/15/2021   A ) torsades noted during her atrial fibrillation ablation, status post successful defibrillation   B ) Toprol-XL 25 mg daily   3  Chronic HFpEF, LVEF 55% per echo 01/2021  4  Hypertension  5  Hyperlipidemia      Procedures Performed:   Orders Placed This Encounter   Procedures    Cardiac eps/afib ablation    Cardiac eps/pacer implant   1  Cryoablation of atrial fibrillation- PVI  2 comprehensive electrophysiologic evaluation with left atrial pacing and recording  3  3-D electro-anatomic mapping using NavX system  4  Intracardiac echocardiography  5  DCCV - Torsades/ VF  in beginning of case, singleDCCV  6  Transeptal  7  Access obtained under ultrasound guidance using Seldinger technique  8  Dual chamber PPM  RA lead   RV lead - His lead - with LPF capture   9  DCCV - converted AFib to sinus rhythm    Consultants: none    HPI: Please refer to the initial history and physical as well as procedure notes for full details  Briefly, Cesar Sandy is a 67y o  year old female with  paroxysmal atrial fibrillation anticoagulated with Eliquis, essential hypertension, chronic diastolic congestive heart failure, hyperlipidemia, and prior thyroid nodules status post lobectomy  She typically follows with Dr Allie Mixon and Dr Ann Story    She was initially diagnosed with atrial fibrillation in 2016, and was subsequently admitted for sotalol initiation  At that time, she was noted to have mild baseline bradycardia and her metoprolol was discontinued  In January 2021, she presented to the hospital with rapid atrial fibrillation despite sotalol and at 1 point was restarted on metoprolol for better rate control  Due to these ongoing episodes, atrial fibrillation ablation was recommended and she presented this hospital admission to undergo this procedure  Hospital Course: Cesar Sandy presented at her baseline state of health  After the procedure was explained in detail and consent was obtained, the afib ablation was started however early on in the procedure she developed worsening bradycardia with QTC prolongation with subsequent torsades  She was successfully defibrillated  Fortunately, she remains stable and an ablation was able to be completed  Please see operative notes by Dr Allie Mixon for full details  Otherwise, she tolerated the procedure well  She was then monitored overnight for further observation  Given her valve episode, her metoprolol and sotalol were discontinued  After lengthy discussion, pacemaker implantation was recommended so as to avoid subsequent bradycardia  She remained in the hospital over the weekend in order to undergo this procedure  Groin sutures were removed without incident  On Monday 3/15/2021, she underwent successful dual-chamber pacemaker implantation without complications  CXR immediately following the procedure showed appropriate lead placement without pneumothorax  She was started on flecainide antiarrhythmic therapy as well as low-dose Toprol-XL in the post device setting, and was then monitored overnight for further observation  There were no acute issues or events overnight following pacemaker placement   On the morning of discharge she denied all cardiac complaints, including chest pain/pressure, dyspnea, palpitations, dizziness, lightheadedness, or syncope  Her vital signs were reviewed and labs are stable  Telemetry showed normal sinus rhythm without arrhythmias  Chest xray this morning again showed appropriate device placement without pneumothorax  Device interrogation showed appropriate device function, including lead sensing, threshold, and impedance  Her incision was clean, dry, and intact without swelling, hematoma, redness, bleeding, drainage, or signs of infection  Review of Systems   Constitution: Positive for malaise/fatigue  Negative for fever  Cardiovascular: Positive for chest pain (Mild soreness at device site, relieved with Tylenol)  Negative for dyspnea on exertion, irregular heartbeat, leg swelling, near-syncope, orthopnea, palpitations, paroxysmal nocturnal dyspnea and syncope  All other systems reviewed and are negative  Physical exam on the day of discharge:  GEN: NAD, alert and oriented x 3, well appearing  SKIN: dry without significant lesions or rashes  HEENT: NCAT, PERRL, EOMs intact  NECK: No JVD appreciated  CARDIOVASCULAR: RRR, normal S1, S2 without murmurs, rubs, or gallops appreciated  LUNGS: Clear to auscultation bilaterally without wheezes, rhonchi, or rales  ABDOMEN: Soft, nontender, nondistended  EXTREMITIES/VASCULAR: perfused without clubbing, cyanosis, or LE edema b/l  PSYCH: Normal mood and affect  NEURO: CN ll-Xll grossly intact    She was given routine post ablation discharge instructions and restrictions, as well as routine post implantation discharge instructions and restrictions, including wound care, and these were explained in detail  She was given a one week ECG follow up for flecainide monitoring, a two week follow up appointment with our device clinic for device interrogation/site check, and a one month follow up appointment with Santos Mckay PA-C for post ablation follow up  She was instructed to follow up with her primary cardiologist as previously instructed      In terms of her medications, she will be discharged on flecainide 100 mg twice daily along with Toprol-XL 25 mg daily  She previously had Lopressor and sotalol on her medication list, which were discontinued  She will continue Eliquis 5 mg twice daily, and will  samples in our office upon discharge  She was asked to hold fish oil for 2 weeks to avoid pocket hematoma  She typically takes Pepcid as needed at home  I asked her to take this regularly for 30 days in the post ablation setting, at which time she can restart taking on an as-needed basis  She is stable for discharge at this time with all questions answered  She was discussed in detail with Dr Darline Corey who is in agreement with this discharge summary  Discharge Medications:  See after visit summary for reconciled discharge medications provided to patient and family      Medications Prior to Admission   Medication    acetaminophen (TYLENOL) 650 mg CR tablet    ascorbic acid (VITAMIN C) 500 mg tablet    Cholecalciferol ([employer] VITAMIN D) 2000 UNITS CAPS    escitalopram (LEXAPRO) 5 mg tablet    ezetimibe (ZETIA) 10 mg tablet    famotidine (PEPCID) 20 mg tablet    gabapentin (NEURONTIN) 300 mg capsule    losartan (COZAAR) 50 mg tablet    metoprolol tartrate (LOPRESSOR) 25 mg tablet    sotalol (BETAPACE) 120 mg tablet    traMADol (ULTRAM) 50 mg tablet    TURMERIC PO    zolpidem (AMBIEN) 10 mg tablet    apixaban (ELIQUIS) 5 mg    Chromium Picolinate (CHROMIUM PICOLATE PO)    rOPINIRole (REQUIP) 0 25 mg tablet         [facility] Labs/diagnostics:  CBC with diff:   Results from last 7 days   Lab Units 03/16/21  0614 03/15/21  0515 03/14/21  0447 03/13/21  0442 03/12/21  1044   WBC Thousand/uL 7 61 8 36 9 89 8 52 6 43   HEMOGLOBIN g/dL 14 0 13 7 13 8 13 2 13 3   HEMATOCRIT % 43 2 41 9 41 7 40 4 41 4   MCV fL 97 96 95 97 96   PLATELETS Thousands/uL 232 245 256 237 238   MCH pg 31 3 31 4 31 4 31 6 30 9   MCHC g/dL 32 4 32 7 33 1 32 7 32 1   RDW % 12 1 12 4 12 3 12 1 12 3   MPV fL 10 7 11 0 10 9 10 9 10 4   NRBC AUTO /100 WBCs  --  0  --   --  0       BMP:  Results from last 7 days   Lab Units 03/16/21  0547 03/15/21  0515 03/14/21 0447 03/13/21 2306 03/13/21 0442 03/12/21  1044   POTASSIUM mmol/L 4 3 4 1 3 7 3 6 4 2 4 3   CHLORIDE mmol/L 104 107 106 105 104 108   CO2 mmol/L 25 25 26 26 25 28   BUN mg/dL 22 23 23 21 22 21   CREATININE mg/dL 0 96 0 92 0 91 1 00 0 99 0 92   CALCIUM mg/dL 9 5 9 2 9 2 9 5 8 7 9 7       Magnesium:   Results from last 7 days   Lab Units 03/15/21  0515 03/14/21 0447 03/13/21 2306 03/13/21 0442   MAGNESIUM mg/dL 2 1 2 0 2 0 1 8       Coags:   Results from last 7 days   Lab Units 03/12/21  1044   INR  5 77         Complications: none    Condition at Discharge: good     Discharge instructions/Information to patient and family:   See after visit summary for information provided to patient and family  Provisions for Follow-Up Care:  See after visit summary for information related to follow-up care and any pertinent home health orders  Disposition: Home    Planned Readmission: No    Discharge Statement   I spent 45 minutes minutes discharging the patient  This time was spent on the day of discharge  I had direct contact with the patient on the day of discharge  Additional documentation is required if more than 30 minutes were spent on discharge   Evaluating the incision, discussing discharge instructions and restrictions, arranging follow up appointments, discussing medications

## 2021-03-16 NOTE — DISCHARGE INSTRUCTIONS
Please stop at our [address] office to  samples of Eliquis  They will be at the  on the 2nd floor  MEDICATION RECOMMENDATIONS:  Please hold your fish oil pills for 2 weeks in the post procedure setting  Begin flecainide 100 mg twice daily  Take your usual dose of Pepcid daily for the next 30 days, at which time he may continue taking it only as needed  Begin Toprol-XL 25 mg daily, and discontinue your home metoprolol dose  Continue Eliquis 5 mg twice daily unless instructed otherwise by Dr Nico Arreola  ABLATION RESTRICTIONS:   No heavy lifting or strenuous activity for one week  No soaking in a bath tub/hot tub/swimming pool for one week or until groin heals  If you notice ongoing bleeding, swelling, or firm lumps in groin near ablation incision, please contact Dr Austin Morgan office - [phone]  PACEMAKER INSTRUCTIONS:  Please refer to post pacemaker implantation discharge instructions and restrictions and your pacemaker booklet/temporary card  Keep incision dry for one week  Do not use lotions/powders/creams on incision  Leave outer bandage in place for 1 week - it is water proof, and as long as it is fully adhered to your skin you may shower with it  If it appears as though the bandage is coming off and/or there is any communication to the area of device incision, please then keep the whole area dry for the remaining week  After 1 week, please remove by pulling all edges away from the center of the bandage  No overhead reaching/pushing/pulling/lifting greater than 5-10lbs with left arm for six weeks  Please call the office if you notice redness, swelling, bleeding, or drainage from incision or if you develop fevers  AFTER PACEMAKER CARE:    If you have any questions, please call [phone] to speak with a nurse (8:30am-4pm, or [phone] after hours)  For appointments, please call [phone]        WHAT YOU SHOULD KNOW:   A pacemaker is a small, battery-powered device that is placed under your skin in your upper chest area with wires placed through a vein that lead directly into the heart  It helps regulate your heart rate and prevent your heart from beating too slowly  AFTER YOU LEAVE:     Medicines:     · Pain medicine: You may need medicine to take away or decrease pain  ¨ Learn how to take your medicine  Ask what medicine and how much you should take  Be sure you know how, when, and how often to take it  Usually Over the counter pain medicine is sufficient to control pain (Acetominophen or Ibuprofen) Ask your doctor if you may take these  If this does not control your pain, narcotic pain killers may be prescribed, please call if you need prescription  ¨ Do not wait until the pain is severe before you take your medicine  Tell caregivers if your pain does not decrease  ¨ Pain medicine can make you dizzy or sleepy  Prevent falls by calling someone when you get out of bed or if you need help  Take your medicine as directed  Call your healthcare provider if you think your medicine is not helping or if you have side effects  Tell him if you are allergic to any medicine  Follow up with your cardiologist after your procedure: You will need a follow-up visit approximately 2 weeks after you leave the hospital  Your cardiologist will check your wound and make sure that your pacemaker is working correctly  Follow the instructions to check your pacemaker: Your cardiologist or primary healthcare provider will check your pacemaker and the battery regularly  He will use a computer to check your pacemaker over the telephone or wireless device which will be given to you  Pacemaker batteries usually last 8 to 10 years  The pacemaker unit will be replaced when the battery gets low  This is a simpler procedure than the original one to implant your pacemaker  Wound care:  Keep your incision dry for one week    Do not use lotions/powders/creams on incision  Leave outer bandage in place for 1 week - it is water proof, and as long as it is fully adhered to your skin you may shower with it  If it appears as though the bandage is coming off and/or there is any communication to the area of device incision, please then keep the whole area dry for the remaining week  After 1 week, please remove by pulling all edges away from the center of the bandage  Please call the office if you notice redness, swelling, bleeding, or drainage from incision or if you develop fevers  Activity:   · Arm movement and lifting:  Be careful using the arm on the side of your pacemaker  Do not move your arm for the first 24 hours after your procedure  Do not  lift your arm above your shoulder or lift more than 10 pounds for one month after your procedure  Avoid pushing, pulling, or repetitive arm movements for one month  This helps the leads stay in place and helps your wound heal  Ask your caregiver when you can drive after your procedure  You may move your arm side to side without lifting above your shoulder, and do not need to wear a sling at home  · Driving: you are ok to drive 48 hours after pacemaker is implanted   · Sports:  Ask your caregiver when it is okay to play tennis, golf, basketball, or any sport that requires you to lift your arms  Do not play full contact sports, such as football, that could damage your pacemaker  Ask your cardiologist or primary healthcare provider how much and what kinds of physical activity are safe for you  Living with a pacemaker:   · Tell all caregivers you have a pacemaker: This includes surgeons, radiologists, and medical technicians  You may want to wear a medical alert ID bracelet or necklace that states that you have a pacemaker  · Carry your pacemaker ID card: Make sure you receive a pacemaker ID card  Carry it with you at all times  It lists important information about your pacemaker   Show it to airport security if you travel  · Avoid electrical interference:  Avoid welding equipment and other equipment with large magnets or electric fields  These things could interfere with how your pacemaker works  Use your cell phone on the ear opposite from your pacemaker  Do not carry your cell phone in your shirt pocket over your chest      · Some Pacemakers are MRI safe  Ask you doctor if it is safe to proceed with MRI and let the radiologist and staff know you have a pacemaker  · Do not touch the skin around your pacemaker: This can cause damage to the lead wires or move the pacemaker unit from where it should be  Contact your cardiologist or primary healthcare provider if:   · The area around your pacemaker has increasing amount of pain after surgery  The pain should improve over first few days after implantation  · The skin around your stitches has increasing redness, swelling, or has drainage  This may mean that you have an infection  · You have a fever  · You have chills, a cough, and feel weak or achy  These are also signs of infection  · Your feet or ankles are more swollen than your baseline  · Your Heart rate is less than 50 beats per minute     Seek care immediately if:   · Your bandage becomes soaked with blood  · Your pacemaker is swelling rapidly    · Your stitches open up  · You feel your heart suddenly beating very slowly or quickly  · You become too weak or dizzy to stand, or you pass out  · Your arm or leg feels warm, tender, and painful  It may look swollen and red  · You have chest pain that does not go away with rest or medicine  · You feel lightheaded, short of breath, and have chest pain  · You cough up blood  © 2014 [address] is for End User's use only and may not be sold, redistributed or otherwise used for commercial purposes   All illustrations and images included in CareNotes® are the copyrighted property of [employer]  or Volodymyr Franco  The above information is an  only  It is not intended as medical advice for individual conditions or treatments  Talk to your doctor, nurse or pharmacist before following any medical regimen to see if it is safe and effective for you

## 2021-03-16 NOTE — PLAN OF CARE
Problem: PAIN - ADULT  Goal: Verbalizes/displays adequate comfort level or baseline comfort level  Description: Interventions:  - Encourage patient to monitor pain and request assistance  - Assess pain using appropriate pain scale  - Administer analgesics based on type and severity of pain and evaluate response  - Implement non-pharmacological measures as appropriate and evaluate response  - Notify physician/advanced practitioner if interventions unsuccessful or patient reports new pain  Outcome: Progressing

## 2021-03-16 NOTE — PLAN OF CARE
Problem: PAIN - ADULT  Goal: Verbalizes/displays adequate comfort level or baseline comfort level  Description: Interventions:  - Encourage patient to monitor pain and request assistance  - Assess pain using appropriate pain scale  - Administer analgesics based on type and severity of pain and evaluate response  - Implement non-pharmacological measures as appropriate and evaluate response  - Notify physician/advanced practitioner if interventions unsuccessful or patient reports new pain  Outcome: Progressing     Problem: INFECTION - ADULT  Goal: Absence or prevention of progression during hospitalization  Description: INTERVENTIONS:  - Assess and monitor for signs and symptoms of infection  - Monitor lab/diagnostic results  - Monitor all insertion sites, i e  indwelling lines, tubes, and drains  - [place] appropriate cooling/warming therapies per order  - Administer medications as ordered  - Instruct and encourage patient and family to use good hand hygiene technique  - Identify and instruct in appropriate isolation precautions for identified infection/condition  Outcome: Progressing     Problem: SAFETY ADULT  Goal: Patient will remain free of falls  Description: INTERVENTIONS:  - Assess patient frequently for physical needs  -  Identify cognitive and physical deficits and behaviors that affect risk of falls    -  [place] fall precautions as indicated by assessment   - Educate patient/family on patient safety including physical limitations  - Instruct patient to call for assistance with activity based on assessment  - Modify environment to reduce risk of injury  - Consider OT/PT consult to assist with strengthening/mobility  Outcome: Progressing  Goal: Maintain or return to baseline ADL function  Description: INTERVENTIONS:  -  Assess patient's ability to carry out ADLs; assess patient's baseline for ADL function and identify physical deficits which impact ability to perform ADLs (bathing, care of mouth/teeth, toileting, grooming, dressing, etc )  - Assess/evaluate cause of self-care deficits   - Assess range of motion  - Assess patient's mobility; develop plan if impaired  - Assess patient's need for assistive devices and provide as appropriate  - Encourage maximum independence but intervene and supervise when necessary  - Involve family in performance of ADLs  - Assess for home care needs following discharge   - Consider OT consult to assist with ADL evaluation and planning for discharge  - Provide patient education as appropriate  Outcome: Progressing  Goal: Maintain or return mobility status to optimal level  Description: INTERVENTIONS:  - Assess patient's baseline mobility status (ambulation, transfers, stairs, etc )    - Identify cognitive and physical deficits and behaviors that affect mobility  - Identify mobility aids required to assist with transfers and/or ambulation (gait belt, sit-to-stand, lift, walker, cane, etc )  - [place] fall precautions as indicated by assessment  - Record patient progress and toleration of activity level on Mobility SBAR; progress patient to next Phase/Stage  - Instruct patient to call for assistance with activity based on assessment  - Consider rehabilitation consult to assist with strengthening/weightbearing, etc   Outcome: Progressing     Problem: DISCHARGE PLANNING  Goal: Discharge to home or other facility with appropriate resources  Description: INTERVENTIONS:  - Identify barriers to discharge w/patient and caregiver  - Arrange for needed discharge resources and transportation as appropriate  - Identify discharge learning needs (meds, wound care, etc )  - Refer to Case Management Department for coordinating discharge planning if the patient needs post-hospital services based on physician/advanced practitioner order or complex needs related to functional status, cognitive ability, or social support system  Outcome: Progressing     Problem: Knowledge Deficit  Goal: Patient/family/caregiver demonstrates understanding of disease process, treatment plan, medications, and discharge instructions  Description: Complete learning assessment and assess knowledge base  Interventions:  - Provide teaching at level of understanding  - Provide teaching via preferred learning methods  Outcome: Progressing     Problem: CARDIOVASCULAR - ADULT  Goal: Maintains optimal cardiac output and hemodynamic stability  Description: INTERVENTIONS:  - Monitor I/O, vital signs and rhythm  - Monitor for S/S and trends of decreased cardiac output  - Administer and titrate ordered vasoactive medications to optimize hemodynamic stability  - Assess quality of pulses, skin color and temperature  - Assess for signs of decreased coronary artery perfusion  - Instruct patient to report change in severity of symptoms  Outcome: Progressing     Problem: Potential for Falls  Goal: Patient will remain free of falls  Description: INTERVENTIONS:  - Assess patient frequently for physical needs  -  Identify cognitive and physical deficits and behaviors that affect risk of falls    -  [place] fall precautions as indicated by assessment   - Educate patient/family on patient safety including physical limitations  - Instruct patient to call for assistance with activity based on assessment  - Modify environment to reduce risk of injury  - Consider OT/PT consult to assist with strengthening/mobility  Outcome: Progressing

## 2021-03-17 ENCOUNTER — TELEPHONE (OUTPATIENT)
Dept: FAMILY MEDICINE CLINIC | Facility: CLINIC | Age: 76
End: 2021-03-17

## 2021-03-17 ENCOUNTER — TRANSITIONAL CARE MANAGEMENT (OUTPATIENT)
Dept: FAMILY MEDICINE CLINIC | Facility: CLINIC | Age: 76
End: 2021-03-17

## 2021-03-22 ENCOUNTER — CLINICAL SUPPORT (OUTPATIENT)
Dept: CARDIOLOGY CLINIC | Facility: CLINIC | Age: 76
End: 2021-03-22
Payer: MEDICARE

## 2021-03-22 VITALS — SYSTOLIC BLOOD PRESSURE: 116 MMHG | DIASTOLIC BLOOD PRESSURE: 76 MMHG | HEART RATE: 80 BPM

## 2021-03-22 DIAGNOSIS — I48.0 PAROXYSMAL ATRIAL FIBRILLATION (HCC): Primary | ICD-10-CM

## 2021-03-22 PROCEDURE — 93000 ELECTROCARDIOGRAM COMPLETE: CPT

## 2021-03-22 NOTE — PROGRESS NOTES
Shola Wolff is here today under the direction of Dr Rigo Dc for one week EKG  No cardiac complaints  Removed Aquacel dressing per pt's request  Instructed to keep the area clean and dry, call us with any changes in incision area  To follow up with device clinic next Monday 03/29 at [address] [place] [address] for first device check  EKG reviewed by Dr Rigo Dc, atrial- paced rhythm, continue to on flecainide as directed

## 2021-03-24 ENCOUNTER — OFFICE VISIT (OUTPATIENT)
Dept: FAMILY MEDICINE CLINIC | Facility: CLINIC | Age: 76
End: 2021-03-24
Payer: MEDICARE

## 2021-03-24 VITALS
HEART RATE: 88 BPM | WEIGHT: 150 LBS | TEMPERATURE: 96.8 F | SYSTOLIC BLOOD PRESSURE: 122 MMHG | RESPIRATION RATE: 16 BRPM | DIASTOLIC BLOOD PRESSURE: 70 MMHG | BODY MASS INDEX: 27.44 KG/M2

## 2021-03-24 DIAGNOSIS — I48.0 PAROXYSMAL ATRIAL FIBRILLATION (HCC): Primary | ICD-10-CM

## 2021-03-24 DIAGNOSIS — Z79.01 CURRENT USE OF LONG TERM ANTICOAGULATION: ICD-10-CM

## 2021-03-24 DIAGNOSIS — I34.2 NONRHEUMATIC MITRAL VALVE STENOSIS: ICD-10-CM

## 2021-03-24 DIAGNOSIS — C73 MALIGNANT NEOPLASM OF THYROID GLAND (HCC): ICD-10-CM

## 2021-03-24 DIAGNOSIS — I36.1 NONRHEUMATIC TRICUSPID VALVE REGURGITATION: ICD-10-CM

## 2021-03-24 DIAGNOSIS — I10 ESSENTIAL HYPERTENSION: ICD-10-CM

## 2021-03-24 DIAGNOSIS — I50.32 CHRONIC DIASTOLIC CHF (CONGESTIVE HEART FAILURE) (HCC): ICD-10-CM

## 2021-03-24 PROBLEM — R07.89 CHEST PRESSURE: Status: RESOLVED | Noted: 2021-01-08 | Resolved: 2021-03-24

## 2021-03-24 PROBLEM — Z95.0 S/P PLACEMENT OF CARDIAC PACEMAKER: Status: ACTIVE | Noted: 2021-03-24

## 2021-03-24 LAB
ATRIAL RATE: 55 BPM
P AXIS: 34 DEGREES
PR INTERVAL: 168 MS
QRS AXIS: 19 DEGREES
QRSD INTERVAL: 94 MS
QT INTERVAL: 522 MS
QTC INTERVAL: 499 MS
T WAVE AXIS: -10 DEGREES
VENTRICULAR RATE: 55 BPM

## 2021-03-24 PROCEDURE — 93010 ELECTROCARDIOGRAM REPORT: CPT | Performed by: INTERNAL MEDICINE

## 2021-03-24 PROCEDURE — 99495 TRANSJ CARE MGMT MOD F2F 14D: CPT | Performed by: FAMILY MEDICINE

## 2021-03-24 NOTE — PROGRESS NOTES
TCM Call (since 2/21/2021)     Date and time call was made  3/17/2021  2:26 PM    Hospital care reviewed  Records reviewed    Patient was hospitialized at  [facility]        Date of Admission  03/12/21    Date of discharge  03/16/21    Diagnosis  Paroxsymal AFIB     Disposition  Home    Were the patients medications reviewed and updated  Yes    Current Symptoms  Fatigue    Fatigue severity  Moderate      TCM Call (since 2/21/2021)     Post hospital issues  Reduced activity    Should patient be enrolled in anticoag monitoring? No    Scheduled for follow up? Yes    Did you obtain your prescribed medications  Yes    Do you need help managing your prescriptions or medications  No    Is transportation to your appointment needed  Yes    Specify why  Not currently driving     I have advised the patient to call PCP with any new or worsening symptoms  [address] [place] or [employer] other    Support System  Children    The type of support provided  Emotional; Physical    Do you have social support  Yes, as much as I need    Are you recieving any outpatient services  No    Are you recieving home care services  No    Are you using any community resources  No    Current waiver services  No    Have you fallen in the last 12 months  No    Interperter language line needed  No    Counseling  Patient            Assessment/Plan:       Diagnoses and all orders for this visit:    Paroxysmal atrial fibrillation ([address] )    Current use of long term anticoagulation    Chronic diastolic CHF (congestive heart failure) ([address] )    Nonrheumatic tricuspid valve regurgitation    Nonrheumatic mitral valve stenosis    Essential hypertension    Malignant neoplasm of thyroid gland ([address] )          Continue with current medications  Monitor blood pressures  Follow-up visit with Cardiology  No treatment at this time for tremor  Patient ID: Amena Guardado is a 67 y o  female  Follow up visit   TCM s/p admission 03/12/2021 to 03/16/2021 Atrial fibrillation status post ablation procedure  Pre procedure episode of bradycardia with QT prolongation and torsades successfully defibrillated  Subsequent pacemaker placed  She was discharged on Metoprolol ER 25 mg/day and Flecainide 100 mg BID  She remains on Losartan 50 mg a day for hypertension  Blood pressures have been stable on current regimen  On Eliquis 5 mg BID  S/p admission 01/2021 with chest pressure and sensation of heart racing symptoms resolved without medications  Admission sinus bradycardia with no acute changes  Troponin x 3 negative  Chest x-ray no active disease  Patient had a previous admission 01/03/2021 to 01/05/2021 for atrial fibrillation with rapid ventricular response  Patient reverted to normal sinus rhythm  She was discharged on Sotalol 120 mg twice a day, Metoprolol tartrate 25 mg twice a day and Eliquis 5 mg BID  Echocardiogram showed normal left ventricular systolic function  EF 55%  Mildly to moderately dilated left atrium  Mildly dilated right atrium  Mild mitral stenosis  Mild to moderate tricuspid regurgitation  Pulmonary artery systolic pressure mildly increased  Hypertension blood pressures have been stable on her current regimen  01/2021 creatinine 0 92  Electrolytes normal   Hemoglobin 14 0  Hyperlipidemia on Zetia 10 mg daily  08/2020 lipid profile cholesterol 182  Triglycerides 121  HDL 54    LFTs normal except for total bilirubin 1 03  FBS 91    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 Dx multi-system atrophy with progressive neurologic impairment      Lab Results   Component Value Date    WBC 7 61 03/16/2021    HGB 14 0 03/16/2021    HCT 43 2 03/16/2021    MCV 97 03/16/2021     03/16/2021     Lab Results   Component Value Date    SODIUM 136 03/16/2021    K 4 3 03/16/2021     03/16/2021    CO2 25 03/16/2021    BUN 22 03/16/2021    CREATININE 0 96 03/16/2021    GLUC 117 03/16/2021    CALCIUM 9 5 03/16/2021     Lab Results   Component Value Date    QBO3BZBFZQHC 1 590 03/13/2021     Lab Results   Component Value Date    CHOLESTEROL 182 08/03/2020    CHOLESTEROL 184 11/25/2019    CHOLESTEROL 202 (H) 09/16/2018     Lab Results   Component Value Date    HDL 54 08/03/2020    HDL 57 11/25/2019    HDL 59 09/16/2018     Lab Results   Component Value Date    TRIG 121 08/03/2020    TRIG 106 11/25/2019    TRIG 109 09/16/2018     Lab Results   Component Value Date    LDLCALC 104 (H) 08/03/2020             The following portions of the patient's history were reviewed and updated as appropriate: allergies, current medications, past family history, past medical history, past social history, past surgical history and problem list     Review of Systems   Constitutional: Positive for fatigue  Negative for appetite change, chills, fever and unexpected weight change  HENT: Positive for hearing loss (bilateral hearing aids )  Negative for congestion, ear pain, rhinorrhea, sore throat and trouble swallowing  Eyes: Negative for visual disturbance  Respiratory: Negative for cough, shortness of breath and wheezing  Cardiovascular: Negative for chest pain, palpitations and leg swelling  See HPI PAF followed by cardiology  06/2020 24 hour Holter monitor predominately sinus bradycardia with avg HR 59 beats/min  Low burden of PACs and PVCs  No significant pauses or advanced heart block  Admission 9/15/2018 through 9/17/2018 for atrial fibrillation with rapid ventricular response  Patient converted to normal sinus rhythm with IV Cardizem  Electrolytes normal   TSH 1 077  Hemoglobin 14 2  Troponin x 3 normal   Magnesium 2 0   Echocardiogram normal left ventricular systolic function  EF 60%  Grade 2 diastolic dysfunction  Right ventricle systolic pressure mildly increased  Mildly dilated left atrium  Mild MR/TR    No pericardial effusion     Gastrointestinal: Negative for abdominal pain, blood in stool, constipation, diarrhea, nausea and vomiting  Colonoscopy 04/2018 Two benign polyps  Mild diverticulosis left-sided colon  Endocrine: Negative for polydipsia and polyuria  12/2020 status post left thyroid lobectomy for left thyroid nodule  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  09/2018 DEXA scan T-score -1 5 left femoral neck  On vitamin D supplement    Lab Results       Component                Value               Date                       [id number]             1 590               03/13/2021               Genitourinary: Negative for difficulty urinating  Musculoskeletal: Positive for arthralgias  Negative for myalgias  OA shoulders she is using prn Tylenol Arthritis and Tramadol   07/2019 x-rays right shoulder mild degenerative arthritis right AC joint  Left shoulder mild degenerative changes left AC joint  08/2019 bilateral shoulder intra-articular steroid injections via fluoroscopy with symptomatic relief  10/2018 s/p right TKR  Skin: Negative for rash  Neurological: Positive for tremors  Negative for dizziness and headaches  RLS on Requip  Mild tremor worse with activity  Family history + essential tremor brother  Hematological: Negative for adenopathy  Does not bruise/bleed easily  Psychiatric/Behavioral: Negative for dysphoric mood and sleep disturbance  The patient is nervous/anxious  On low dose Lexapro 5 mg daily   She has stopped taking prior to her recent admission but has restarted          Objective:    /70   Pulse 88   Temp (!) 96 8 °F (36 °C)   Resp 16   Wt 68 kg (150 lb)   LMP 02/01/1990 (Within Weeks)   BMI 27 44 kg/m²     BP Readings from Last 3 Encounters:   03/24/21 122/70   03/22/21 116/76   03/16/21 141/81     Wt Readings from Last 3 Encounters:   03/24/21 68 kg (150 lb)   03/12/21 67 1 kg (148 lb)   02/04/21 70 2 kg (154 lb 12 8 oz) Physical Exam  Vitals signs and nursing note reviewed  Constitutional:       General: She is not in acute distress  Appearance: She is well-developed  HENT:      Right Ear: Tympanic membrane and ear canal normal       Left Ear: Tympanic membrane and ear canal normal    Eyes:      General: No scleral icterus  Extraocular Movements: Extraocular movements intact  Conjunctiva/sclera: Conjunctivae normal       Pupils: Pupils are equal, round, and reactive to light  Neck:      Thyroid: No thyroid mass or thyromegaly  Vascular: No carotid bruit or JVD  Trachea: No tracheal deviation  Cardiovascular:      Rate and Rhythm: Normal rate and regular rhythm  Heart sounds: Normal heart sounds  No murmur  No gallop  Pulmonary:      Effort: Pulmonary effort is normal  No respiratory distress  Breath sounds: Normal breath sounds  No wheezing or rales  Abdominal:      General: Bowel sounds are normal  There is no distension or abdominal bruit  Palpations: Abdomen is soft  There is no hepatomegaly, splenomegaly or mass  Tenderness: There is no abdominal tenderness  There is no guarding or rebound  Musculoskeletal:      Right lower leg: No edema  Left lower leg: No edema  Lymphadenopathy:      Cervical: No cervical adenopathy  Upper Body:      Right upper body: No supraclavicular adenopathy  Left upper body: No supraclavicular adenopathy  Skin:     Findings: No rash  Nails: There is no clubbing  Neurological:      General: No focal deficit present  Mental Status: She is alert and oriented to person, place, and time  Comments: No rest tremor  Mild action tremor with right hand    No cogwheeling/rigidity   Psychiatric:         Mood and Affect: Mood normal

## 2021-03-25 DIAGNOSIS — M79.7 FIBROMYALGIA: ICD-10-CM

## 2021-03-25 DIAGNOSIS — G47.01 INSOMNIA DUE TO MEDICAL CONDITION: ICD-10-CM

## 2021-03-25 DIAGNOSIS — M17.11 PRIMARY OSTEOARTHRITIS OF RIGHT KNEE: ICD-10-CM

## 2021-03-25 DIAGNOSIS — M47.816 LUMBAR SPONDYLOSIS: ICD-10-CM

## 2021-03-25 RX ORDER — GABAPENTIN 300 MG/1
CAPSULE ORAL
Qty: 180 CAPSULE | Refills: 4 | Status: SHIPPED | OUTPATIENT
Start: 2021-03-25 | End: 2022-06-17

## 2021-03-26 DIAGNOSIS — I48.0 PAROXYSMAL ATRIAL FIBRILLATION (HCC): ICD-10-CM

## 2021-03-26 RX ORDER — ZOLPIDEM TARTRATE 10 MG/1
TABLET ORAL
Qty: 90 TABLET | Refills: 0 | Status: SHIPPED | OUTPATIENT
Start: 2021-03-26 | End: 2021-08-24

## 2021-03-26 RX ORDER — TRAMADOL HYDROCHLORIDE 50 MG/1
TABLET ORAL
Qty: 180 TABLET | Refills: 0 | Status: SHIPPED | OUTPATIENT
Start: 2021-03-26 | End: 2021-06-23

## 2021-03-26 RX ORDER — METOPROLOL SUCCINATE 25 MG/1
25 TABLET, EXTENDED RELEASE ORAL DAILY
Qty: 90 TABLET | Refills: 3 | Status: SHIPPED | OUTPATIENT
Start: 2021-03-26 | End: 2021-04-12 | Stop reason: SDUPTHER

## 2021-03-29 ENCOUNTER — IN-CLINIC DEVICE VISIT (OUTPATIENT)
Dept: CARDIOLOGY CLINIC | Facility: CLINIC | Age: 76
End: 2021-03-29

## 2021-03-29 DIAGNOSIS — Z95.0 PACEMAKER: Primary | ICD-10-CM

## 2021-03-29 PROCEDURE — 99024 POSTOP FOLLOW-UP VISIT: CPT | Performed by: INTERNAL MEDICINE

## 2021-03-29 NOTE — PROGRESS NOTES
Results for orders placed or performed in visit on 03/29/21   Cardiac EP device report    Narrative    MDT-DUAL CHAMBER PPM (AAIR-DDDR MODE)/ ACTIVE SYSTEM IS MRI CONDITIONAL  WOUND CHECK: INCISION CLEAN AND DRY WITH EDGES APPROXIMATED; STERISTRIPS REMOVED; WOUND CARE AND RESTRICTIONS REVIEWED WITH PATIENT  BATTERY ADEQUATE (11 YRS)  AP 96%;  0%  ALL LEAD PARAMETERS WITHIN NORMAL LIMITS  NO EPISODES  PT  TAKES ELIQUIS, FLECAINIDE & METOPROLOL SUCC  NORMAL DEVICE FUNCTION   PL

## 2021-04-01 ENCOUNTER — APPOINTMENT (OUTPATIENT)
Dept: LAB | Facility: CLINIC | Age: 76
End: 2021-04-01
Payer: MEDICARE

## 2021-04-01 ENCOUNTER — CONSULT (OUTPATIENT)
Dept: NEUROLOGY | Facility: CLINIC | Age: 76
End: 2021-04-01
Payer: MEDICARE

## 2021-04-01 VITALS
HEART RATE: 80 BPM | SYSTOLIC BLOOD PRESSURE: 126 MMHG | DIASTOLIC BLOOD PRESSURE: 60 MMHG | BODY MASS INDEX: 27.79 KG/M2 | HEIGHT: 62 IN | WEIGHT: 151 LBS

## 2021-04-01 DIAGNOSIS — R25.1 TREMORS OF NERVOUS SYSTEM: ICD-10-CM

## 2021-04-01 DIAGNOSIS — G62.9 PERIPHERAL POLYNEUROPATHY: ICD-10-CM

## 2021-04-01 DIAGNOSIS — G60.9 HEREDITARY AND IDIOPATHIC NEUROPATHY, UNSPECIFIED: ICD-10-CM

## 2021-04-01 DIAGNOSIS — Z86.69 HX OF DIPLOPIA: ICD-10-CM

## 2021-04-01 DIAGNOSIS — M51.34 THORACIC DEGENERATIVE DISC DISEASE: ICD-10-CM

## 2021-04-01 DIAGNOSIS — R27.9 UNSPECIFIED LACK OF COORDINATION: ICD-10-CM

## 2021-04-01 DIAGNOSIS — Z86.69 HX OF DIPLOPIA: Primary | ICD-10-CM

## 2021-04-01 LAB
ALBUMIN SERPL BCP-MCNC: 3.8 G/DL (ref 3.5–5)
ALP SERPL-CCNC: 68 U/L (ref 46–116)
ALT SERPL W P-5'-P-CCNC: 25 U/L (ref 12–78)
ANION GAP SERPL CALCULATED.3IONS-SCNC: 2 MMOL/L (ref 4–13)
AST SERPL W P-5'-P-CCNC: 14 U/L (ref 5–45)
BASOPHILS # BLD AUTO: 0.08 THOUSANDS/ΜL (ref 0–0.1)
BASOPHILS NFR BLD AUTO: 1 % (ref 0–1)
BILIRUB SERPL-MCNC: 0.42 MG/DL (ref 0.2–1)
BUN SERPL-MCNC: 27 MG/DL (ref 5–25)
CALCIUM SERPL-MCNC: 9.4 MG/DL (ref 8.3–10.1)
CHLORIDE SERPL-SCNC: 105 MMOL/L (ref 100–108)
CHOLEST SERPL-MCNC: 176 MG/DL (ref 50–200)
CO2 SERPL-SCNC: 31 MMOL/L (ref 21–32)
CREAT SERPL-MCNC: 0.96 MG/DL (ref 0.6–1.3)
EOSINOPHIL # BLD AUTO: 0.26 THOUSAND/ΜL (ref 0–0.61)
EOSINOPHIL NFR BLD AUTO: 4 % (ref 0–6)
ERYTHROCYTE [DISTWIDTH] IN BLOOD BY AUTOMATED COUNT: 12.6 % (ref 11.6–15.1)
GFR SERPL CREATININE-BSD FRML MDRD: 58 ML/MIN/1.73SQ M
GLUCOSE P FAST SERPL-MCNC: 88 MG/DL (ref 65–99)
HCT VFR BLD AUTO: 39.8 % (ref 34.8–46.1)
HDLC SERPL-MCNC: 66 MG/DL
HGB BLD-MCNC: 12.6 G/DL (ref 11.5–15.4)
IMM GRANULOCYTES # BLD AUTO: 0.02 THOUSAND/UL (ref 0–0.2)
IMM GRANULOCYTES NFR BLD AUTO: 0 % (ref 0–2)
LDLC SERPL CALC-MCNC: 88 MG/DL (ref 0–100)
LYMPHOCYTES # BLD AUTO: 2.21 THOUSANDS/ΜL (ref 0.6–4.47)
LYMPHOCYTES NFR BLD AUTO: 33 % (ref 14–44)
MCH RBC QN AUTO: 31.1 PG (ref 26.8–34.3)
MCHC RBC AUTO-ENTMCNC: 31.7 G/DL (ref 31.4–37.4)
MCV RBC AUTO: 98 FL (ref 82–98)
MONOCYTES # BLD AUTO: 0.7 THOUSAND/ΜL (ref 0.17–1.22)
MONOCYTES NFR BLD AUTO: 11 % (ref 4–12)
NEUTROPHILS # BLD AUTO: 3.4 THOUSANDS/ΜL (ref 1.85–7.62)
NEUTS SEG NFR BLD AUTO: 51 % (ref 43–75)
NONHDLC SERPL-MCNC: 110 MG/DL
NRBC BLD AUTO-RTO: 0 /100 WBCS
PLATELET # BLD AUTO: 266 THOUSANDS/UL (ref 149–390)
PMV BLD AUTO: 10.2 FL (ref 8.9–12.7)
POTASSIUM SERPL-SCNC: 4.4 MMOL/L (ref 3.5–5.3)
PROT SERPL-MCNC: 7.3 G/DL (ref 6.4–8.2)
RBC # BLD AUTO: 4.05 MILLION/UL (ref 3.81–5.12)
SODIUM SERPL-SCNC: 138 MMOL/L (ref 136–145)
TRIGL SERPL-MCNC: 112 MG/DL
VIT B12 SERPL-MCNC: 399 PG/ML (ref 100–900)
WBC # BLD AUTO: 6.67 THOUSAND/UL (ref 4.31–10.16)

## 2021-04-01 PROCEDURE — 99204 OFFICE O/P NEW MOD 45 MIN: CPT | Performed by: PSYCHIATRY & NEUROLOGY

## 2021-04-01 PROCEDURE — 83918 ORGANIC ACIDS TOTAL QUANT: CPT

## 2021-04-01 PROCEDURE — 82390 ASSAY OF CERULOPLASMIN: CPT

## 2021-04-01 PROCEDURE — 84165 PROTEIN E-PHORESIS SERUM: CPT

## 2021-04-01 PROCEDURE — 83519 RIA NONANTIBODY: CPT

## 2021-04-01 PROCEDURE — 84238 ASSAY NONENDOCRINE RECEPTOR: CPT

## 2021-04-01 PROCEDURE — 80061 LIPID PANEL: CPT | Performed by: FAMILY MEDICINE

## 2021-04-01 PROCEDURE — 85025 COMPLETE CBC W/AUTO DIFF WBC: CPT | Performed by: FAMILY MEDICINE

## 2021-04-01 PROCEDURE — 82525 ASSAY OF COPPER: CPT

## 2021-04-01 PROCEDURE — 80053 COMPREHEN METABOLIC PANEL: CPT | Performed by: FAMILY MEDICINE

## 2021-04-01 PROCEDURE — 84207 ASSAY OF VITAMIN B-6: CPT

## 2021-04-01 PROCEDURE — 82607 VITAMIN B-12: CPT

## 2021-04-01 PROCEDURE — 36415 COLL VENOUS BLD VENIPUNCTURE: CPT | Performed by: FAMILY MEDICINE

## 2021-04-01 PROCEDURE — 86334 IMMUNOFIX E-PHORESIS SERUM: CPT | Performed by: PATHOLOGY

## 2021-04-01 PROCEDURE — 84165 PROTEIN E-PHORESIS SERUM: CPT | Performed by: PATHOLOGY

## 2021-04-01 PROCEDURE — 86334 IMMUNOFIX E-PHORESIS SERUM: CPT

## 2021-04-01 NOTE — PROGRESS NOTES
Patient ID: Doug Ahumada is a 67 y o  female  Assessment/Plan:    Tremors of nervous system    She has a fine tremor in both hands, only noticeable when she does certain activities, it is not particularly bothersome to her right now, not impairing any activities of daily living  Symptoms most consistent with benign essential tremors, does have family history of similar tremors in her brother, who is status post DBS  Currently her symptoms are mild, we talked about medications, she would like to hold off for now, if symptoms do get worse, can consider primidone  She is on beta-blocker for her a fibrillation  Did recommend checking copper and ceruloplasmin levels for her baseline  Peripheral neuropathy  She does have mild acral sensory loss, has had paresthesias in her feet, which are managed well with the current doses of gabapentin, with an underlying diagnosis of restless leg syndrome  Strength and muscle bulk were normal throughout, very minimal acral sensory loss was noted as below, that can be considered normal for this patient's age  Recommended some Routine labs for reversible causes of neuropathy  In addition, she reports heat like sensation around her abdomen in the thoracic region, only bothers her when she sits in certain postures, exam was unremarkable from the standpoint, she did not have any spinal tenderness along the thoracic spine, does have mild spondylosis, no significant cord compression or nerve impingement  Continue symptomatic care with Aspercreme/Biofreeze  Lastly, she does have history of diplopia, I did not appreciate any ptosis, symptoms are not consistent with myasthenia gravis, though recommended antibodies to complete her workup  Diagnoses and all orders for this visit:    Hx of diplopia  -     Copper Level; Future  -     Ceruloplasmin; Future  -     Acetylcholine receptor, binding; Future  -     Acetylcholine receptor, blocking;  Future  -     Acetylcholine receptor, modulating; Future    Thoracic degenerative disc disease  -     Ambulatory referral to Neurology    Peripheral polyneuropathy  -     Ambulatory referral to Neurology  -     Vitamin B12; Future  -     Methylmalonic acid, serum; Future  -     Vitamin B6; Future  -     Protein electrophoresis, serum; Future    Tremors of nervous system  -     Copper Level; Future  -     Ceruloplasmin; Future    Unspecified lack of coordination   -     Vitamin B12; Future    Hereditary and idiopathic neuropathy, unspecified   -     Vitamin B6; Future           Subjective:    HPI     I had the pleasure of seeing your patient in [facility] for neuromuscular consultation  As you know, she is a 58-year-old woman who was referred for evaluation for thoracic pain, as well as for tremors  Please allow me to summarize her history for the record  Patient was accompanied by her son today  Patient reports she has had a warm sensation around her abdomen, which has been going on for the last 1 year  Symptoms started about a year ago, with a sensation of heat like a band that started from thoracic region, would radiate to both sides of the abdomen along the rib cage, and now comes all the way in the front  Symptoms are bilateral   These are intermittent, feels the sensation more so if she is sits in an upright position, and would improve within a minute or 2 if she changes her position and change her posture  She does not have any pain or dysesthesias, describes is as a heat like sensation, which is annoying  She has been using icy Hot which tends to help  Along with this, she does not have any pain or paresthesias in the lower extremities, balance is minimally affected, tends to stumble here and there, no major falls  Denies any bladder or bowel dysfunction, no weakness in the lower extremities    No continuous or ongoing upper or lower back pain,   Lower back may bother her intermittently depending on her activity level       Besides this, she complains of tremors in both of her hands, tremors are not present at rest, and only bother her if she keeps her hands in a pronated position  She notices more when she  Tries to read a book at nighttime  She does not think her handwriting has changed significantly, tremors are not interfering with the ability to feed herself, no issues using her silverware, or using a knife while she is working in [employer]  Denies any stiffness, does have history of restless leg syndrome  No other speech or swallowing difficulties  Did have diplopia that started about a year ago,  She was evaluated by ophthalmologist, and was given prisms  diplopia was binocular,  Has not been particularly bothersome to her since she has been seeing her new glasses with prisms  Denies any ptosis  No muscle weakness in upper or lower extremities  No family history of Parkinson's disease, her brother does have significant essential tremors, status post DBS placement  No neuromuscular disorders in the family  She is not a smoker, no alcohol use  Blood work done in the hospital included normal TSH   CBC, CMP were normal     MRI of the thoracic spine performed in September 2020, images were personally reviewed by me as a part of this evaluation, she does have mild spondylosis and osteoarthritis, without any evidence for cord compression, or any neuroforaminal compression  Does have history of paroxysmal Afib, status post pacemaker  A few weeks ago  Besides this, she does have dyslipidemia, and hypertension  She does have chronic lower back pain and fibromyalgia, for which she has been following with pain management  She is on gabapentin 600 mg at bedtime for her lower back pain which has been helpful      The following portions of the patient's history were reviewed and updated as appropriate: allergies, current medications, past family history, past medical history, past social history, past surgical history and problem list          Objective:    Blood pressure 126/60, pulse 80, height 5' 2" (1 575 m), weight 68 5 kg (151 lb), last menstrual period 02/01/1990  Physical Exam   General exam: Pt was awake, alert and oriented  HEENT: atraumatic, normocephalic  Normal oral mucosa, neck was supple, no lymphadenopathy  Normal peripheral pulses  Extremities did not show any edema or cyanosis  Neurological Exam  Neurologically, pt was awake and alert  Speech was normal, no dysarthria or aphasia  Cranial nerve exam showed normal extraocular movements, no nystagmus, She  Did endorse diplopia in the left lateral gaze, diplopia was binocular,   Improved with prisms  Accommodation and convergence was intact  There was no ptosis at baseline or with sustained upward gaze  Strength of eye closure muscles was normal   Facial sensations were normal bilaterally  No facial weakness, able to blow out the cheeks and push the tongue in the cheeks well  No tongue atrophy or fasciculations  Motor exam revealed normal tone and muscle bulk  There was no atrophy, scapular winging, high arches, hammertoes, shortening of achilles tendons or any other features of neuromuscular disease  No cogwheeling, or rigidity in either upper extremity  She did have a very fine tremor in the outstretched position, more so in a pronated position  Fine motor movements were normal, rapid alternating movements were normal   Muscle strength was normal in neck flexors and extensors, and all muscle groups in both upper and lower extremities  Reflexes were graded as 2+  In the upper extremities, 2 at the knees, decreased at the ankles  Toes were downgoing  There was no exaggerated jaw jerk or hurtado's sign  No ankle clonus  Sensory examination revealed decreased sensation to pinprick and temperature in both feet, up to the ankles bilaterally    Vibration was mildly reduced at the toes, with relatively intact proprioception  Sensation was normal along the abdomen, no spinal tenderness  Gait was casual and normal       ROS:  I reviewed the below ROS and what is mentioned in HPI, the remainder of ROS was negative  Review of Systems   Constitutional: Negative  Negative for appetite change and fever  HENT: Negative  Negative for hearing loss, tinnitus, trouble swallowing and voice change  Eyes: Negative  Negative for photophobia and pain  Respiratory: Negative  Negative for shortness of breath  Cardiovascular: Negative  Negative for palpitations  Gastrointestinal: Negative  Negative for nausea and vomiting  Endocrine: Negative  Negative for cold intolerance  Genitourinary: Negative  Negative for dysuria, frequency and urgency  Musculoskeletal: Negative  Negative for myalgias and neck pain  Skin: Negative  Negative for rash  Neurological: Positive for tremors (hands)  Negative for dizziness, seizures, syncope, facial asymmetry, speech difficulty, weakness, light-headedness, numbness and headaches  Hematological: Negative  Does not bruise/bleed easily  Psychiatric/Behavioral: Negative  Negative for confusion, hallucinations and sleep disturbance

## 2021-04-02 LAB
ALBUMIN SERPL ELPH-MCNC: 4.15 G/DL (ref 3.5–5)
ALBUMIN SERPL ELPH-MCNC: 58.4 % (ref 52–65)
ALPHA1 GLOB SERPL ELPH-MCNC: 0.31 G/DL (ref 0.1–0.4)
ALPHA1 GLOB SERPL ELPH-MCNC: 4.3 % (ref 2.5–5)
ALPHA2 GLOB SERPL ELPH-MCNC: 0.82 G/DL (ref 0.4–1.2)
ALPHA2 GLOB SERPL ELPH-MCNC: 11.6 % (ref 7–13)
BETA GLOB ABNORMAL SERPL ELPH-MCNC: 0.48 G/DL (ref 0.4–0.8)
BETA1 GLOB SERPL ELPH-MCNC: 6.8 % (ref 5–13)
BETA2 GLOB SERPL ELPH-MCNC: 6.7 % (ref 2–8)
BETA2+GAMMA GLOB SERPL ELPH-MCNC: 0.48 G/DL (ref 0.2–0.5)
CERULOPLASMIN SERPL-MCNC: 22.1 MG/DL (ref 19–39)
GAMMA GLOB ABNORMAL SERPL ELPH-MCNC: 0.87 G/DL (ref 0.5–1.6)
GAMMA GLOB SERPL ELPH-MCNC: 12.2 % (ref 12–22)
IGG/ALB SER: 1.4 {RATIO} (ref 1.1–1.8)
INTERPRETATION UR IFE-IMP: NORMAL
M PROTEIN 1 MFR SERPL ELPH: 3.6 %
M PROTEIN 1 SERPL ELPH-MCNC: 0.26 G/DL
PROT PATTERN SERPL ELPH-IMP: NORMAL
PROT SERPL-MCNC: 7.1 G/DL (ref 6.4–8.2)

## 2021-04-02 NOTE — ASSESSMENT & PLAN NOTE
She does have mild acral sensory loss, has had paresthesias in her feet, which are managed well with the current doses of gabapentin, with an underlying diagnosis of restless leg syndrome  Strength and muscle bulk were normal throughout, very minimal acral sensory loss was noted as below, that can be considered normal for this patient's age  Recommended some Routine labs for reversible causes of neuropathy  In addition, she reports heat like sensation around her abdomen in the thoracic region, only bothers her when she sits in certain postures, exam was unremarkable from the standpoint, she did not have any spinal tenderness along the thoracic spine, does have mild spondylosis, no significant cord compression or nerve impingement  Continue symptomatic care with Aspercreme/Biofreeze  Lastly, she does have history of diplopia, I did not appreciate any ptosis, symptoms are not consistent with myasthenia gravis, though recommended antibodies to complete her workup

## 2021-04-02 NOTE — ASSESSMENT & PLAN NOTE
She has a fine tremor in both hands, only noticeable when she does certain activities, it is not particularly bothersome to her right now, not impairing any activities of daily living  Symptoms most consistent with benign essential tremors, does have family history of similar tremors in her brother, who is status post DBS  Currently her symptoms are mild, we talked about medications, she would like to hold off for now, if symptoms do get worse, can consider primidone  She is on beta-blocker for her a fibrillation  Did recommend checking copper and ceruloplasmin levels for her baseline

## 2021-04-04 LAB — COPPER SERPL-MCNC: 109 UG/DL (ref 80–158)

## 2021-04-05 ENCOUNTER — PATIENT OUTREACH (OUTPATIENT)
Dept: FAMILY MEDICINE CLINIC | Facility: CLINIC | Age: 76
End: 2021-04-05

## 2021-04-05 DIAGNOSIS — I10 ESSENTIAL HYPERTENSION: ICD-10-CM

## 2021-04-05 LAB — ACHR BIND AB SER-SCNC: <0.03 NMOL/L (ref 0–0.24)

## 2021-04-05 RX ORDER — LOSARTAN POTASSIUM 50 MG/1
50 TABLET ORAL DAILY
Qty: 90 TABLET | Refills: 1 | Status: SHIPPED | OUTPATIENT
Start: 2021-04-05 | End: 2021-08-12 | Stop reason: SDUPTHER

## 2021-04-06 LAB — ACHR BLOCK AB/ACHR TOTAL SFR SER: 17 % (ref 0–25)

## 2021-04-08 ENCOUNTER — TELEPHONE (OUTPATIENT)
Dept: FAMILY MEDICINE CLINIC | Facility: CLINIC | Age: 76
End: 2021-04-08

## 2021-04-08 LAB — ACHR MOD AB/ACHR TOTAL SFR SER: <12 % (ref 0–20)

## 2021-04-08 NOTE — TELEPHONE ENCOUNTER
Call re labs   CBC normal   No anemia  Fasting blood sugar 88  Less than 100 normal   Slightly elevated BUN 1 of two kidney function tests this can be related to hydration- stay hydrated  Her creatinine the other kidney function test is normal   Cholesterol 176 normal less than 200      Recent Results (from the past 336 hour(s))   CBC and differential    Collection Time: 04/01/21  1:45 PM   Result Value Ref Range    WBC 6 67 4 31 - 10 16 Thousand/uL    RBC 4 05 3 81 - 5 12 Million/uL    Hemoglobin 12 6 11 5 - 15 4 g/dL    Hematocrit 39 8 34 8 - 46 1 %    MCV 98 82 - 98 fL    MCH 31 1 26 8 - 34 3 pg    MCHC 31 7 31 4 - 37 4 g/dL    RDW 12 6 11 6 - 15 1 %    MPV 10 2 8 9 - 12 7 fL    Platelets 392 231 - 871 Thousands/uL    nRBC 0 /100 WBCs    Neutrophils Relative 51 43 - 75 %    Immat GRANS % 0 0 - 2 %    Lymphocytes Relative 33 14 - 44 %    Monocytes Relative 11 4 - 12 %    Eosinophils Relative 4 0 - 6 %    Basophils Relative 1 0 - 1 %    Neutrophils Absolute 3 40 1 85 - 7 62 Thousands/µL    Immature Grans Absolute 0 02 0 00 - 0 20 Thousand/uL    Lymphocytes Absolute 2 21 0 60 - 4 47 Thousands/µL    Monocytes Absolute 0 70 0 17 - 1 22 Thousand/µL    Eosinophils Absolute 0 26 0 00 - 0 61 Thousand/µL    Basophils Absolute 0 08 0 00 - 0 10 Thousands/µL   Comprehensive metabolic panel    Collection Time: 04/01/21  1:45 PM   Result Value Ref Range    Sodium 138 136 - 145 mmol/L    Potassium 4 4 3 5 - 5 3 mmol/L    Chloride 105 100 - 108 mmol/L    CO2 31 21 - 32 mmol/L    ANION GAP 2 (L) 4 - 13 mmol/L    BUN 27 (H) 5 - 25 mg/dL    Creatinine 0 96 0 60 - 1 30 mg/dL    Glucose, Fasting 88 65 - 99 mg/dL    Calcium 9 4 8 3 - 10 1 mg/dL    AST 14 5 - 45 U/L    ALT 25 12 - 78 U/L    Alkaline Phosphatase 68 46 - 116 U/L    Total Protein 7 3 6 4 - 8 2 g/dL    Albumin 3 8 3 5 - 5 0 g/dL    Total Bilirubin 0 42 0 20 - 1 00 mg/dL    eGFR 58 ml/min/1 73sq m   Lipid panel    Collection Time: 04/01/21  1:45 PM   Result Value Ref Range Cholesterol 176 50 - 200 mg/dL    Triglycerides 112 <=150 mg/dL    HDL, Direct 66 >=40 mg/dL    LDL Calculated 88 0 - 100 mg/dL    Non-HDL-Chol (CHOL-HDL) 110 mg/dl   Copper Level    Collection Time: 04/01/21  1:45 PM   Result Value Ref Range    Copper 109 80 - 158 ug/dL   Ceruloplasmin    Collection Time: 04/01/21  1:45 PM   Result Value Ref Range    Ceruloplasmin 22 1 19 0 - 39 0 mg/dL   Acetylcholine receptor, binding    Collection Time: 04/01/21  1:45 PM   Result Value Ref Range    AChR Binding Ab, Serum <0 03 0 00 - 0 24 nmol/L   Acetylcholine receptor, blocking    Collection Time: 04/01/21  1:45 PM   Result Value Ref Range    AChR Blocking Abs, Serum 17 0 - 25 %   Acetylcholine receptor, modulating    Collection Time: 04/01/21  1:45 PM   Result Value Ref Range    AChR Modulating Abs <12 0 - 20 %   Vitamin B12    Collection Time: 04/01/21  1:45 PM   Result Value Ref Range    Vitamin B-12 399 100 - 900 pg/mL   Protein electrophoresis, serum    Collection Time: 04/01/21  1:45 PM   Result Value Ref Range    A/G Ratio 1 40 1 10 - 1 80    Albumin Electrophoresis 58 4 52 0 - 65 0 %    Albumin CONC 4 15 3 50 - 5 00 g/dl    Alpha 1 4 3 2 5 - 5 0 %    ALPHA 1 CONC 0 31 0 10 - 0 40 g/dL    Alpha 2 11 6 7 0 - 13 0 %    ALPHA 2 CONC 0 82 0 40 - 1 20 g/dL    Beta-1 6 8 5 0 - 13 0 %    BETA 1 CONC 0 48 0 40 - 0 80 g/dL    Beta-2 6 7 2 0 - 8 0 %    BETA 2 CONC 0 48 0 20 - 0 50 g/dL    Gamma Globulin 12 2 12 0 - 22 0 %    GAMMA CONC 0 87 0 50 - 1 60 g/dL    M Peak ID 1 3 60 %    M PEAK 1 CONC 0 26 g/dL    Total Protein 7 1 6 4 - 8 2 g/dL    SPEP Interpretation See Comment    Immunofixation, Serum(Reflex Only-Do Not Order)    Collection Time: 04/01/21  1:45 PM   Result Value Ref Range    Immunofixation Interpretation See Comment          Current Outpatient Medications:     acetaminophen (TYLENOL) 650 mg CR tablet, Take 1 tablet (650 mg total) by mouth every 6 (six) hours as needed for mild pain or moderate pain, Disp: 30 tablet, Rfl: 0    apixaban (ELIQUIS) 5 mg, Take 1 tablet (5 mg total) by mouth 2 (two) times a day, Disp: 180 tablet, Rfl: 0    ascorbic acid (VITAMIN C) 500 mg tablet, Take 500 mg by mouth daily , Disp: , Rfl:     Cholecalciferol (CVS VITAMIN D) 2000 UNITS CAPS, Take 2,000 Units by mouth 2 (two) times a day  , Disp: , Rfl:     Chromium Picolinate (CHROMIUM PICOLATE PO), Take 1 tablet by mouth daily, Disp: , Rfl:     escitalopram (LEXAPRO) 5 mg tablet, Take 1 tablet (5 mg total) by mouth daily, Disp: 30 tablet, Rfl: 2    ezetimibe (ZETIA) 10 mg tablet, TAKE 1 TABLET BY MOUTH  DAILY, Disp: 90 tablet, Rfl: 3    famotidine (PEPCID) 20 mg tablet, Take 20 mg by mouth daily  , Disp: , Rfl:     flecainide (TAMBOCOR) 100 mg tablet, Take 1 tablet (100 mg total) by mouth every 12 (twelve) hours, Disp: 60 tablet, Rfl: 3    gabapentin (NEURONTIN) 300 mg capsule, TAKE 2 CAPSULES BY MOUTH AT BEDTIME, Disp: 180 capsule, Rfl: 4    losartan (COZAAR) 50 mg tablet, Take 1 tablet (50 mg total) by mouth daily, Disp: 90 tablet, Rfl: 1    metoprolol succinate (TOPROL-XL) 25 mg 24 hr tablet, Take 1 tablet (25 mg total) by mouth daily, Disp: 90 tablet, Rfl: 3    rOPINIRole (REQUIP) 0 25 mg tablet, TAKE 1 TABLET BY MOUTH  DAILY AT BEDTIME, Disp: 90 tablet, Rfl: 3    traMADol (ULTRAM) 50 mg tablet, TAKE 1 TABLET BY MOUTH  TWICE DAILY AS NEEDED FOR  PAIN, Disp: 180 tablet, Rfl: 0    TURMERIC PO, Take 1 capsule by mouth 2 (two) times a day, Disp: , Rfl:     zolpidem (AMBIEN) 10 mg tablet, TAKE 1 TABLET BY MOUTH  DAILY AT BEDTIME AS NEEDED  FOR SLEEP, Disp: 90 tablet, Rfl: 0

## 2021-04-09 LAB
METHYLMALONATE SERPL-SCNC: 484 NMOL/L (ref 0–378)
SL AMB DISCLAIMER: ABNORMAL

## 2021-04-10 LAB — VIT B6 SERPL-MCNC: 10.2 UG/L (ref 2–32.8)

## 2021-04-12 DIAGNOSIS — I48.0 PAROXYSMAL ATRIAL FIBRILLATION (HCC): ICD-10-CM

## 2021-04-13 RX ORDER — METOPROLOL SUCCINATE 25 MG/1
25 TABLET, EXTENDED RELEASE ORAL DAILY
Qty: 14 TABLET | Refills: 3 | Status: SHIPPED | OUTPATIENT
Start: 2021-04-13 | End: 2021-05-27 | Stop reason: SDUPTHER

## 2021-04-14 ENCOUNTER — OFFICE VISIT (OUTPATIENT)
Dept: CARDIOLOGY CLINIC | Facility: CLINIC | Age: 76
End: 2021-04-14
Payer: MEDICARE

## 2021-04-14 VITALS
WEIGHT: 151.4 LBS | SYSTOLIC BLOOD PRESSURE: 122 MMHG | HEIGHT: 62 IN | DIASTOLIC BLOOD PRESSURE: 74 MMHG | BODY MASS INDEX: 27.86 KG/M2 | HEART RATE: 80 BPM

## 2021-04-14 DIAGNOSIS — I48.0 PAROXYSMAL ATRIAL FIBRILLATION (HCC): Primary | ICD-10-CM

## 2021-04-14 PROCEDURE — 99214 OFFICE O/P EST MOD 30 MIN: CPT | Performed by: PHYSICIAN ASSISTANT

## 2021-04-14 PROCEDURE — 93000 ELECTROCARDIOGRAM COMPLETE: CPT | Performed by: PHYSICIAN ASSISTANT

## 2021-04-14 NOTE — PROGRESS NOTES
Electrophysiology Office Note    Ang Capps  1945  [id number]  HEART & VASCULAR [facility] [place]  [address]        Assessment/Plan     1  Paroxysmal atrial fibrillation (HCC)  POCT ECG     1  Paroxysmal atrial fibrillation, symptomatic    * patient presents to [facility] EP office today for post atrial fibrillation ablation f/u    * today ECG showing atrially paced rhythm at 80bpm    * recent device interrogations showing no atrial fibrillation since returning home from hospitalization    * is on AAD with flecainide 100mg BID without any side affects, on toprol XL for AVNB with flec    * was on Sotalol however due to prolonged QTc and torsades at beginning of her AF Ablation this medication was discontinued indefinitely    * we did discuss modifiable risk factors to reduce AF burden today however patient is in excellent health and does a fantastic job taking charge of her health  She does not have uncontrolled T2DM, HF, uncontrolled HTN, does not consume alcoholic beverages or consume tobacco    * last ECHO showing EF 55%    * plan to f/u in 6 months with Dr Jesus Goodson for flecainide and AF monitoring or sooner should symptoms arise     2  Prolonged QTc on sotalol with torsades s/p DCCV during ablation   3  Chronic HFpEF   4  HTN  5  HLD             Rhythm History:   Atrial fibrillation:    1  Sotalol initiation - 2016   2  QTc prolongation leading to torsades at beginning of AF ablation - sotalol discontinued indefinitely - 3-2021  3  Flecainide initiation - 3/2021  4   S/p atrial fibrillation ablation by Dr Raj Stephen - 3/2021    Atrial flutter:     SVT:     VT/VF:     Device history:   Pacemaker:  1  TBS s/p MDT DC PPM by Dr Jesus Goodson - 2021    Defibrillator:    BIV PPM:    BIV ICD:    ILR:      Cardiac Testing:     ECHO:   Results for orders placed during the hospital encounter of 01/03/21   Echo complete with contrast if indicated    Narrative Presley Curran [address]  [phone]    Transthoracic Echocardiogram  2D, M-mode, Doppler, and Color Doppler    Study date:  2021    Patient: Tommie Samuel  MR number: [id number]  Account number: [de-identified]  : 1945  Age: 67 years  Gender: Female  Status: Inpatient  Location: Bedside  Height: 62 in  Weight: 151 6 lb  BP: 126/ 94 mmHg    Indications: Atrial fibrillation    Diagnoses: I48 0 - Atrial fibrillation    Sonographer:  IGNACIO Smyth  Primary Physician:  Vinh Lamas MD  Referring Physician:  Manda Messina MD  Group:  [facility]  Interpreting Physician:  Jai Veras MD    SUMMARY    LEFT VENTRICLE:  Systolic function was normal  Ejection fraction was estimated to be 55 %  There were no regional wall motion abnormalities  Wall thickness was at the upper limits of normal   Doppler parameters were consistent with elevated ventricular end-diastolic filling pressure  LEFT ATRIUM:  The atrium was mildly to moderately dilated  RIGHT ATRIUM:  The atrium was mildly dilated  MITRAL VALVE:  There was mild stenosis  TRICUSPID VALVE:  There was mild to moderate regurgitation  Pulmonary artery systolic pressure was mildly increased  HISTORY: PRIOR HISTORY: HLD, HTN, PAF, chronic CHF, thyroid nodule    PROCEDURE: The procedure was performed at the bedside  This was a routine study  The transthoracic approach was used  The study included complete 2D imaging, M-mode, complete spectral Doppler, and color Doppler  The heart rate was 101 bpm,  at the start of the study  Images were obtained from the parasternal, apical, subcostal, and suprasternal notch acoustic windows  Echocardiographic views were limited due to lung interference  This was a technically difficult study  LEFT VENTRICLE: Size was normal  Systolic function was normal  Ejection fraction was estimated to be 55 %   There were no regional wall motion abnormalities  Wall thickness was at the upper limits of normal  DOPPLER: Transmitral flow  pattern: atrial fibrillation  Left ventricular diastolic function parameters were abnormal  Doppler parameters were consistent with elevated ventricular end-diastolic filling pressure  RIGHT VENTRICLE: The size was normal  Systolic function was normal  Wall thickness was normal     LEFT ATRIUM: The atrium was mildly to moderately dilated  RIGHT ATRIUM: The atrium was mildly dilated  MITRAL VALVE: Valve structure was normal  There was normal leaflet separation  DOPPLER: The transmitral velocity was within the normal range  There was mild stenosis  There was no significant regurgitation  AORTIC VALVE: The valve was trileaflet  Leaflets exhibited normal thickness and normal cuspal separation  DOPPLER: Transaortic velocity was within the normal range  There was no evidence for stenosis  There was no significant  regurgitation  TRICUSPID VALVE: The valve structure was normal  There was normal leaflet separation  DOPPLER: The transtricuspid velocity was within the normal range  There was no evidence for stenosis  There was mild to moderate regurgitation  Pulmonary  artery systolic pressure was mildly increased  PULMONIC VALVE: Leaflets exhibited normal thickness, no calcification, and normal cuspal separation  DOPPLER: The transpulmonic velocity was within the normal range  There was no significant regurgitation  PERICARDIUM: There was no pericardial effusion  The pericardium was normal in appearance  AORTA: The root exhibited normal size  SYSTEMIC VEINS: IVC: The inferior vena cava was normal in size  PULMONARY VEINS: DOPPLER: Doppler signals were not recordable in the pulmonary vein(s)      SYSTEM MEASUREMENT TABLES    2D  %FS: 37 15 %  Ao Diam: 2 74 cm  Ao asc: 2 72 cm  EDV(Teich): 103 17 ml  EF(Teich): 67 06 %  ESV(Teich): 33 98 ml  IVSd: 0 94 cm  LA Area: 13 2 cm2  LA Diam: 3 98 cm  LVEDV MOD A4C: 32 01 ml  LVEF MOD A4C: 63 74 %  LVESV MOD A4C: 11 61 ml  LVIDd: 4 72 cm  LVIDs: 2 96 cm  LVLd A4C: 5 9 cm  LVLs A4C: 4 79 cm  LVPWd: 0 93 cm  RA Area: 8 67 cm2  RVIDd: 3 35 cm  SV MOD A4C: 20 4 ml  SV(Teich): 69 19 ml    CW  TR MaxP 39 mmHg  TR Vmax: 2 89 m/s    MM  TAPSE: 1 51 cm    [employer] Accredited Echocardiography Laboratory    Prepared and electronically signed by    Telma Shaw MD  Signed 2021 11:05:34         CATH/STRESS TEST ():      PERFUSION DEFECTS:  -  There were no perfusion defects      GATED SPECT:  The calculated left ventricular ejection fraction was 77 %  There was no left  ventricular regional abnormality      SUMMARY:  -  Stress results: There was no chest pain during stress  -  ECG conclusions: The stress ECG was negative for ischemia  -  Perfusion imaging: There were no perfusion defects   -  Gated SPECT: The calculated left ventricular ejection fraction was 77 %  There was no left ventricular regional abnormality      IMPRESSIONS: Myocardial perfusion imaging was normal at rest and with stress      EKG:   A paced rhythm           History of Present Illness     HPI/INTERVAL HISTORY: Vadim Juarez is a 67 y o  female with history as above who presents to [facility] EP office today under direction of Dr Adeline Rdz for post atrial fibrillation ablation and PPM follow up      21:  Since returning home from her atrial fibrillation ablation and PPM she has felt extremely well! She does have very brief episodes of "flutters" lasting a few seconds before spontaneously resolving  other than this no concerns or complaints  Review of Systems  ROS as noted above, otherwise 12 point review of systems was performed and is negative         Historical Information   Social History     Socioeconomic History    Marital status: /Civil Union     Spouse name: Not on file    Number of children: Not on file    Years of education: Not on file    Highest education level: Not on file   Occupational History    Not on file   Social Needs    Financial resource strain: Not on file    Food insecurity     Worry: Not on file     Inability: Not on file    Transportation needs     Medical: Not on file     Non-medical: Not on file   Tobacco Use    Smoking status: Never Smoker    Smokeless tobacco: Never Used   Substance and Sexual Activity    Alcohol use: Not Currently     Drinks per session: 1 or 2     Comment: occosional - every 3 months    Drug use: Never    Sexual activity: Not Currently   Lifestyle    Physical activity     Days per week: Not on file     Minutes per session: Not on file    Stress: Not on file   Relationships    Social connections     Talks on phone: Not on file     Gets together: Not on file     Attends Orthodox service: Not on file     Active member of club or organization: Not on file     Attends meetings of clubs or organizations: Not on file     Relationship status: Not on file    Intimate partner violence     Fear of current or ex partner: Not on file     Emotionally abused: Not on file     Physically abused: Not on file     Forced sexual activity: Not on file   Other Topics Concern    Not on file   Social History Narrative    Not on file     Past Medical History:   Diagnosis Date    Actinic keratosis     last assessed - [id number]    Arthritis     Atrial fibrillation with rapid ventricular response ([facility] [address] )     last assessed - 26Apr2016    Basal cell carcinoma     Benign colon polyp     last assessed - 27Apr2015    Disease of thyroid gland     Effusion of knee joint right     Resolved - [id number]    Esophageal reflux     Fibromyalgia     last assessed - [id number]    Fibromyalgia, primary     GERD (gastroesophageal reflux disease)     Hypertension     Palpitations     last assessed - 30Apr2013    Peroneal tendonitis, right     last assessed - 02Oct2013    Right lumbar radiculopathy 3/17/2016    Thyroid nodule     Trochanteric bursitis of left hip 3/9/2018     Past Surgical History:   Procedure Laterality Date    CATARACT EXTRACTION Bilateral     COLONOSCOPY  03/2018    EYE SURGERY      HYSTERECTOMY      JOINT REPLACEMENT Left     knee    MA REVISE MEDIAN N/CARPAL TUNNEL SURG Right 11/14/2019    Procedure: RELEASE CARPAL TUNNEL;  Surgeon: Corine Maldonado MD;  [facility];  Service: Orthopedics    MA THYROID LOBECTOMY,UNILAT Left 12/16/2020    Procedure: Left THYROID LOBECTOMY;  Surgeon: Lubna Perea MD;  [facility];  Service: ENT    RECTAL POLYPECTOMY      REPLACEMENT TOTAL KNEE Left     last assessed - 27Apr2015    TONSILLECTOMY      TOTAL ABDOMINAL HYSTERECTOMY      TUBAL LIGATION      US GUIDED THYROID BIOPSY  10/14/2020     Social History     Substance and Sexual Activity   Alcohol Use Not Currently    Drinks per session: 1 or 2    Comment: occosional - every 3 months     Social History     Substance and Sexual Activity   Drug Use Never     Social History     Tobacco Use   Smoking Status Never Smoker   Smokeless Tobacco Never Used     Family History   Problem Relation Age of Onset    Heart disease Mother     Diabetes Mother     Heart disease Father     Coronary artery disease Father     Stroke Father         cerebrovascular accident    Heart attack Father         myocardial infarction    Sudden death Father         scd    Other Family         Back disorder    Coronary artery disease Family     Neuropathy Family     Osteoporosis Family     No Known Problems Daughter     No Known Problems Maternal Grandmother     No Known Problems Maternal Grandfather     No Known Problems Paternal Grandmother     No Known Problems Paternal Grandfather     Cancer Maternal Uncle     Breast cancer Maternal Aunt 72    No Known Problems Son     No Known Problems Maternal Aunt     No Known Problems Maternal Aunt     No Known Problems Maternal Aunt     No Known Problems Paternal Aunt     No Known Problems Paternal Aunt     Anuerysm Neg Hx     Clotting disorder Neg Hx     Arrhythmia Neg Hx     Heart failure Neg Hx        Meds/Allergies       Current Outpatient Medications:     acetaminophen (TYLENOL) 650 mg CR tablet, Take 1 tablet (650 mg total) by mouth every 6 (six) hours as needed for mild pain or moderate pain, Disp: 30 tablet, Rfl: 0    apixaban (ELIQUIS) 5 mg, Take 1 tablet (5 mg total) by mouth 2 (two) times a day, Disp: 180 tablet, Rfl: 0    ascorbic acid (VITAMIN C) 500 mg tablet, Take 500 mg by mouth daily , Disp: , Rfl:     Cholecalciferol ([employer] VITAMIN D) 2000 UNITS CAPS, Take 2,000 Units by mouth 2 (two) times a day  , Disp: , Rfl:     Chromium Picolinate (CHROMIUM PICOLATE PO), Take 1 tablet by mouth daily, Disp: , Rfl:     escitalopram (LEXAPRO) 5 mg tablet, Take 1 tablet (5 mg total) by mouth daily, Disp: 30 tablet, Rfl: 2    ezetimibe (ZETIA) 10 mg tablet, TAKE 1 TABLET BY MOUTH  DAILY, Disp: 90 tablet, Rfl: 3    famotidine (PEPCID) 20 mg tablet, Take 20 mg by mouth daily  , Disp: , Rfl:     flecainide (TAMBOCOR) 100 mg tablet, Take 1 tablet (100 mg total) by mouth every 12 (twelve) hours, Disp: 60 tablet, Rfl: 3    gabapentin (NEURONTIN) 300 mg capsule, TAKE 2 CAPSULES BY MOUTH AT BEDTIME, Disp: 180 capsule, Rfl: 4    losartan (COZAAR) 50 mg tablet, Take 1 tablet (50 mg total) by mouth daily, Disp: 90 tablet, Rfl: 1    metoprolol succinate (TOPROL-XL) 25 mg 24 hr tablet, Take 1 tablet (25 mg total) by mouth daily, Disp: 14 tablet, Rfl: 3    rOPINIRole (REQUIP) 0 25 mg tablet, TAKE 1 TABLET BY MOUTH  DAILY AT BEDTIME, Disp: 90 tablet, Rfl: 3    traMADol (ULTRAM) 50 mg tablet, TAKE 1 TABLET BY MOUTH  TWICE DAILY AS NEEDED FOR  PAIN, Disp: 180 tablet, Rfl: 0    TURMERIC PO, Take 1 capsule by mouth 2 (two) times a day, Disp: , Rfl:     zolpidem (AMBIEN) 10 mg tablet, TAKE 1 TABLET BY MOUTH  DAILY AT BEDTIME AS NEEDED  FOR SLEEP, Disp: 90 tablet, Rfl: 0    Allergies   Allergen Reactions    Sotalol Other (See Comments)     Prolonged QTc leading to torsades de pointes     Penicillins Other (See Comments)     As a child calcium deposit in the arm     Ace Inhibitors GI Intolerance     Did feel well on it       Objective   Vitals: Blood pressure 122/74, pulse 80, height 5' 2" (1 575 m), weight 68 7 kg (151 lb 6 4 oz), last menstrual period 02/01/1990  Physical Exam  Constitutional:       Appearance: She is well-developed  HENT:      Head: Normocephalic and atraumatic  Eyes:      Pupils: Pupils are equal, round, and reactive to light  Neck:      Musculoskeletal: Normal range of motion and neck supple  Cardiovascular:      Rate and Rhythm: Normal rate and regular rhythm  Pulmonary:      Effort: Pulmonary effort is normal       Breath sounds: Normal breath sounds  Abdominal:      General: Bowel sounds are normal       Palpations: Abdomen is soft  Musculoskeletal: Normal range of motion  Skin:     General: Skin is warm and dry  Neurological:      Mental Status: She is alert and oriented to person, place, and time  Labs:not applicable    Imaging: I have personally reviewed pertinent reports

## 2021-04-15 ENCOUNTER — TELEPHONE (OUTPATIENT)
Dept: NEUROLOGY | Facility: CLINIC | Age: 76
End: 2021-04-15

## 2021-04-15 DIAGNOSIS — G62.9 NEUROPATHY: ICD-10-CM

## 2021-04-15 DIAGNOSIS — D47.2 MGUS (MONOCLONAL GAMMOPATHY OF UNKNOWN SIGNIFICANCE): Primary | ICD-10-CM

## 2021-04-15 NOTE — PROGRESS NOTES
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

## 2021-04-15 NOTE — TELEPHONE ENCOUNTER
Reviewed labs with patient  She is taking 2000mcg of B12 daily  Will continue  She will have labs again in [address] [place] [address]

## 2021-04-15 NOTE — TELEPHONE ENCOUNTER
----- Message from Jason Chapa MD sent at 4/15/2021  8:17 AM EDT -----  Please let the patient know that her blood work from last week showed slightly low vitamin B12 levels, if she is not taking already, she can start 1000 micro g daily  In addition, serum protein electrophoresis  was abnormal, it showed one of the proteins (IgG lambda) was slightly higher than the others  This is benign, not uncommonly seen in patients of her age, we usually repeat blood work in 6 months to gauge stability   I will place orders for the blood work to be done in 6 months      ----- Message -----  From: Lab, Background User  Sent: 4/1/2021   5:57 PM EDT  To: Jason Chapa MD

## 2021-04-16 ENCOUNTER — CLINICAL SUPPORT (OUTPATIENT)
Dept: CARDIOLOGY CLINIC | Facility: CLINIC | Age: 76
End: 2021-04-16
Payer: MEDICARE

## 2021-04-16 ENCOUNTER — TELEPHONE (OUTPATIENT)
Dept: CARDIOLOGY CLINIC | Facility: CLINIC | Age: 76
End: 2021-04-16

## 2021-04-16 ENCOUNTER — TELEPHONE (OUTPATIENT)
Dept: OTHER | Facility: OTHER | Age: 76
End: 2021-04-16

## 2021-04-16 VITALS
WEIGHT: 152 LBS | DIASTOLIC BLOOD PRESSURE: 80 MMHG | HEIGHT: 62 IN | HEART RATE: 127 BPM | BODY MASS INDEX: 27.97 KG/M2 | SYSTOLIC BLOOD PRESSURE: 120 MMHG

## 2021-04-16 DIAGNOSIS — I48.0 PAROXYSMAL ATRIAL FIBRILLATION (HCC): Primary | ICD-10-CM

## 2021-04-16 PROCEDURE — 93000 ELECTROCARDIOGRAM COMPLETE: CPT | Performed by: PHYSICIAN ASSISTANT

## 2021-04-16 PROCEDURE — 99024 POSTOP FOLLOW-UP VISIT: CPT | Performed by: INTERNAL MEDICINE

## 2021-04-16 NOTE — TELEPHONE ENCOUNTER
Talk to patient, she will send a transmission from her pacemaker and this will be followed by the clinic

## 2021-04-16 NOTE — TELEPHONE ENCOUNTER
Patient l/m at 9 AM that spoke with an on-call doc this AM regarding rapid HB/weakness since 6:30 AM   I left message for patient to call to discuss further

## 2021-04-16 NOTE — PROGRESS NOTES
Maria Luisa Chamberlain is here today under the direction of Elizabeth Panchal 44  Patient complains of palpitations, SOB, and fatigued  I showed the ekg to christy friend and he had the patient stop her flecainide 100mg BID and to take an extra metoprolol succinate 25mg

## 2021-04-16 NOTE — TELEPHONE ENCOUNTER
Called patient to assess symptoms from adjusting meotprolol she did not answer  I called her daughter Rikki Matamoros who informed me she was on the phone with her mother Damian Whitfield at the time of my call  Per Rikki Matamoros, Ms James Son is feeling much better since this AM  Will have Damian Whitfield see Dr Anjelica Mercado on Monday at 200pm which was OK'd by Dr Catrina Claude for further management  Recommended Farnaz stay on toprol XL 50mg once daily throughout the weekend for now

## 2021-04-16 NOTE — TELEPHONE ENCOUNTER
[phone]/Pt is Latricediane Frank pratibha [birth year]/Pt woke up having heart palpitations for more than 5 mins  She was advice to speak with a dr right away if this happened  Dr Philip Lucio was paged     Please allow 20-30 mins for the provider to call you back  If you do not hear from the provider, please call us back

## 2021-04-19 ENCOUNTER — OFFICE VISIT (OUTPATIENT)
Dept: CARDIOLOGY CLINIC | Facility: CLINIC | Age: 76
End: 2021-04-19
Payer: MEDICARE

## 2021-04-19 VITALS
DIASTOLIC BLOOD PRESSURE: 74 MMHG | BODY MASS INDEX: 28.16 KG/M2 | HEIGHT: 62 IN | SYSTOLIC BLOOD PRESSURE: 118 MMHG | HEART RATE: 98 BPM | WEIGHT: 153 LBS

## 2021-04-19 DIAGNOSIS — Z95.0 S/P PLACEMENT OF CARDIAC PACEMAKER: ICD-10-CM

## 2021-04-19 DIAGNOSIS — Z63.79 STRESS DUE TO ILLNESS OF FAMILY MEMBER: ICD-10-CM

## 2021-04-19 DIAGNOSIS — I50.32 CHRONIC DIASTOLIC CHF (CONGESTIVE HEART FAILURE) (HCC): ICD-10-CM

## 2021-04-19 DIAGNOSIS — Z79.01 CURRENT USE OF LONG TERM ANTICOAGULATION: ICD-10-CM

## 2021-04-19 DIAGNOSIS — C73 MALIGNANT NEOPLASM OF THYROID GLAND (HCC): ICD-10-CM

## 2021-04-19 DIAGNOSIS — I48.0 PAROXYSMAL ATRIAL FIBRILLATION (HCC): Primary | ICD-10-CM

## 2021-04-19 DIAGNOSIS — Z79.899 LONG TERM CURRENT USE OF AMIODARONE: Primary | ICD-10-CM

## 2021-04-19 DIAGNOSIS — I10 ESSENTIAL HYPERTENSION: ICD-10-CM

## 2021-04-19 PROCEDURE — 93000 ELECTROCARDIOGRAM COMPLETE: CPT | Performed by: INTERNAL MEDICINE

## 2021-04-19 PROCEDURE — 99024 POSTOP FOLLOW-UP VISIT: CPT | Performed by: INTERNAL MEDICINE

## 2021-04-19 RX ORDER — AMIODARONE HYDROCHLORIDE 200 MG/1
200 TABLET ORAL DAILY
Qty: 30 TABLET | Refills: 5 | Status: SHIPPED | OUTPATIENT
Start: 2021-04-19 | End: 2021-07-15 | Stop reason: SDUPTHER

## 2021-04-19 RX ORDER — AMIODARONE HYDROCHLORIDE 200 MG/1
200 TABLET ORAL 3 TIMES DAILY
Qty: 42 TABLET | Refills: 0 | Status: SHIPPED | OUTPATIENT
Start: 2021-04-19 | End: 2021-05-06 | Stop reason: ALTCHOICE

## 2021-04-19 NOTE — PROGRESS NOTES
Cardiology Follow Up    Kathya Walters  1945  [id number]  [facility] [id number] [facility]  [phone]    1  Paroxysmal atrial fibrillation (HCC)  POCT ECG   2  Malignant neoplasm of thyroid gland ([facility] [address] )     3  Essential hypertension     4  Chronic diastolic CHF (congestive heart failure) ([facility] )     5  Current use of long term anticoagulation     6  Stress due to illness of family member     9  S/P placement of cardiac pacemaker         Interval History:   The patient underwent comprehensive ablation for paroxysmal atrial fibrillation  During the procedure she went into ventricular fibrillation-she was bradycardic and on sotalol  Post ablation procedures she also underwent pacemaker placement    Patient was doing well for sometime  However she has recurrence of atrial fibrillation with RVR  She is symptomatic with the palpitation    Prior to ablation she was having recurrent episodes of AFib with RVR resulting in diastolic heart failure      She is not complaining of anginal like chest pain  She is not complaining of worsening orthopnea or PND  She is not complaining of worsening leg swelling    She does feel the palpitation and it is uncomfortable   Heart rate has disc  after discontinuing flecainide    Patient is a never smoker   She does not abuse alcohol  She does not use recreational drugs   She does not use energy drinks     She does not have a history of snoring   She does not have history of morning fatigue or daytime sleepiness     There is a family history of diabetes, coronary artery disease, stroke      prior medical history  The patient is a pleasant 23-year-old lady, with a history of A fib    This has been present for at least 6 years, but there has been a recent increase in frequency and duration  Was started on sotalol 80 bid about 3 years ago     Predominant symptom is palpitation and occasional shortness of breath and chest pain  There has been no episode of presyncope or syncope  She does not complain of orthopnea or paroxysmal nocturnal dyspnea  Patient does not have any leg swelling     she does have a history of snoring, morning fatigability and daytime sleepiness  However sleep study was negative for significant AKIN     The patient does have a history of knee replacement and has significant arthritis of her other knee     She has minimal episodes after being on higher dose of sotalol     She takes care of her  and cannot stay too long in hospital      /74 (BP Location: Left arm, Patient Position: Sitting, Cuff Size: Adult)   Pulse 98   Ht 5' 2" (1 575 m)   Wt 69 4 kg (153 lb)   LMP 02/01/1990 (Within Weeks)   BMI 27 98 kg/m²       Patient Active Problem List   Diagnosis    Essential hypertension    Allergic rhinitis    Fibromyalgia    Hyperlipidemia    Insomnia    Lumbar spondylosis    Lumbar stenosis    Osteoarthrosis, hand    Osteoarthritis of knee    Osteopenia    Paroxysmal atrial fibrillation (HCC)    Peripheral neuropathy    Restless legs syndrome    TMJ syndrome    Vitamin D deficiency    Osteoarthritis of shoulder    Carpal tunnel syndrome on right    Arthritis of both acromioclavicular joints    Chronic rhinitis    Chronic diastolic CHF (congestive heart failure) (HCC)    Stress due to illness of family member    Malignant neoplasm of thyroid gland ([address] )    Current use of long term anticoagulation    S/P placement of cardiac pacemaker    Tremors of nervous system    Hx of diplopia     Past Medical History:   Diagnosis Date    Actinic keratosis     last assessed - [id number]    Arthritis     Atrial fibrillation with rapid ventricular response ([address] )     last assessed - 26Apr2016    Basal cell carcinoma     Benign colon polyp     last assessed - 27Apr2015    Disease of thyroid gland     Effusion of knee joint right Resolved - [id number]    Esophageal reflux     Fibromyalgia     last assessed - [id number]    Fibromyalgia, primary     GERD (gastroesophageal reflux disease)     Hypertension     Palpitations     last assessed - [id number]    Peroneal tendonitis, right     last assessed - [id number]    Right lumbar radiculopathy 3/17/2016    Thyroid nodule     Trochanteric bursitis of left hip 3/9/2018     Social History     Socioeconomic History    Marital status: /Civil Union     Spouse name: Not on file    Number of children: Not on file    Years of education: Not on file    Highest education level: Not on file   Occupational History    Not on file   Social Needs    Financial resource strain: Not on file    Food insecurity     Worry: Not on file     Inability: Not on file   [employer] needs     Medical: Not on file     Non-medical: Not on file   Tobacco Use    Smoking status: Never Smoker    Smokeless tobacco: Never Used   Substance and Sexual Activity    Alcohol use: Not Currently     Drinks per session: 1 or 2     Comment: occosional - every 3 months    Drug use: Never    Sexual activity: Not Currently   Lifestyle    Physical activity     Days per week: Not on file     Minutes per session: Not on file    Stress: Not on file   Relationships    Social connections     Talks on phone: Not on file     Gets together: Not on file     Attends Baptism service: Not on file     Active member of club or organization: Not on file     Attends meetings of clubs or organizations: Not on file     Relationship status: Not on file    Intimate partner violence     Fear of current or ex partner: Not on file     Emotionally abused: Not on file     Physically abused: Not on file     Forced sexual activity: Not on file   Other Topics Concern    Not on file   Social History Narrative    Not on file      Family History   Problem Relation Age of Onset    Heart disease Mother     Diabetes Mother     Heart disease Father     Coronary artery disease Father     Stroke Father         cerebrovascular accident    Heart attack Father         myocardial infarction    Sudden death Father         scd    Other Family         Back disorder    Coronary artery disease Family     Neuropathy Family     Osteoporosis Family     No Known Problems Daughter     No Known Problems Maternal Grandmother     No Known Problems Maternal Grandfather     No Known Problems Paternal Grandmother     No Known Problems Paternal Grandfather     Cancer Maternal Uncle     Breast cancer Maternal Aunt 72    No Known Problems Son     No Known Problems Maternal Aunt     No Known Problems Maternal Aunt     No Known Problems Maternal Aunt     No Known Problems Paternal Aunt     No Known Problems Paternal Aunt     Anuerysm Neg Hx     Clotting disorder Neg Hx     Arrhythmia Neg Hx     Heart failure Neg Hx      Past Surgical History:   Procedure Laterality Date    CATARACT EXTRACTION Bilateral     COLONOSCOPY  03/2018    EYE SURGERY      HYSTERECTOMY      JOINT REPLACEMENT Left     knee    OK REVISE MEDIAN N/CARPAL TUNNEL SURG Right 11/14/2019    Procedure: RELEASE CARPAL TUNNEL;  Surgeon: Frances Rowe MD;  [facility];  Service: Orthopedics    OK THYROID LOBECTOMY,UNILAT Left 12/16/2020    Procedure: Left THYROID LOBECTOMY;  Surgeon: Chris Uriostegui MD;  [facility];  Service: ENT    RECTAL POLYPECTOMY      REPLACEMENT TOTAL KNEE Left     last assessed - [id number]    TONSILLECTOMY      TOTAL ABDOMINAL HYSTERECTOMY      TUBAL LIGATION      US GUIDED THYROID BIOPSY  10/14/2020       Current Outpatient Medications:     apixaban (ELIQUIS) 5 mg, Take 1 tablet (5 mg total) by mouth 2 (two) times a day, Disp: 180 tablet, Rfl: 0    ascorbic acid (VITAMIN C) 500 mg tablet, Take 500 mg by mouth daily , Disp: , Rfl:     Cholecalciferol ([employer] VITAMIN D) 2000 UNITS CAPS, Take 2,000 Units by mouth 2 (two) times a day  , Disp: , Rfl:     escitalopram (LEXAPRO) 5 mg tablet, Take 1 tablet (5 mg total) by mouth daily, Disp: 30 tablet, Rfl: 2    ezetimibe (ZETIA) 10 mg tablet, TAKE 1 TABLET BY MOUTH  DAILY, Disp: 90 tablet, Rfl: 3    famotidine (PEPCID) 20 mg tablet, Take 20 mg by mouth daily  , Disp: , Rfl:     gabapentin (NEURONTIN) 300 mg capsule, TAKE 2 CAPSULES BY MOUTH AT BEDTIME, Disp: 180 capsule, Rfl: 4    losartan (COZAAR) 50 mg tablet, Take 1 tablet (50 mg total) by mouth daily, Disp: 90 tablet, Rfl: 1    metoprolol succinate (TOPROL-XL) 25 mg 24 hr tablet, Take 1 tablet (25 mg total) by mouth daily (Patient taking differently: Take 50 mg by mouth daily ), Disp: 14 tablet, Rfl: 3    rOPINIRole (REQUIP) 0 25 mg tablet, TAKE 1 TABLET BY MOUTH  DAILY AT BEDTIME, Disp: 90 tablet, Rfl: 3    traMADol (ULTRAM) 50 mg tablet, TAKE 1 TABLET BY MOUTH  TWICE DAILY AS NEEDED FOR  PAIN, Disp: 180 tablet, Rfl: 0    TURMERIC PO, Take 1 capsule by mouth 2 (two) times a day, Disp: , Rfl:     zolpidem (AMBIEN) 10 mg tablet, TAKE 1 TABLET BY MOUTH  DAILY AT BEDTIME AS NEEDED  FOR SLEEP, Disp: 90 tablet, Rfl: 0    acetaminophen (TYLENOL) 650 mg CR tablet, Take 1 tablet (650 mg total) by mouth every 6 (six) hours as needed for mild pain or moderate pain (Patient not taking: Reported on 4/19/2021), Disp: 30 tablet, Rfl: 0    amiodarone 200 mg tablet, Take 1 tablet (200 mg total) by mouth 3 (three) times a day, Disp: 42 tablet, Rfl: 0    amiodarone 200 mg tablet, Take 1 tablet (200 mg total) by mouth daily, Disp: 30 tablet, Rfl: 5    Chromium Picolinate (CHROMIUM PICOLATE PO), Take 1 tablet by mouth daily, Disp: , Rfl:   Allergies   Allergen Reactions    Sotalol Other (See Comments)     Prolonged QTc leading to torsades de pointes     Penicillins Other (See Comments)     As a child calcium deposit in the arm     Ace Inhibitors GI Intolerance     Did feel well on it       Labs:  Lab Results   Component Value Date     05/06/2015    K 4 4 04/01/2021    K 4 8 05/06/2015     04/01/2021    CL 99 (L) 05/06/2015    CO2 31 04/01/2021    CO2 28 05/06/2015    BUN 27 (H) 04/01/2021    BUN 19 05/06/2015    CREATININE 0 96 04/01/2021    CREATININE 0 97 05/06/2015    GLUCOSE 114 05/06/2015    CALCIUM 9 4 04/01/2021    CALCIUM 9 0 05/06/2015     Lab Results   Component Value Date    CKTOTAL 100 07/10/2014    TROPONINI <0 02 01/08/2021     Lab Results   Component Value Date    WBC 6 67 04/01/2021    WBC 6 63 05/06/2015    HGB 12 6 04/01/2021    HGB 13 3 05/06/2015    HCT 39 8 04/01/2021    HCT 39 3 05/06/2015    MCV 98 04/01/2021    MCV 93 05/06/2015     04/01/2021     05/06/2015     Lab Results   Component Value Date    CHOL 205 05/06/2015    TRIG 112 04/01/2021    TRIG 90 05/06/2015    HDL 66 04/01/2021    HDL 53 05/06/2015     Imaging: No results found  ambulatory event monitor   October 2020                echocardiogram   January 2021  LEFT VENTRICLE:  Systolic function was normal  Ejection fraction was estimated to be 55 %  There were no regional wall motion abnormalities  Wall thickness was at the upper limits of normal   Doppler parameters were consistent with elevated ventricular end-diastolic filling pressure      LEFT ATRIUM:  The atrium was mildly to moderately dilated      RIGHT ATRIUM:  The atrium was mildly dilated      MITRAL VALVE:  There was mild stenosis      TRICUSPID VALVE:  There was mild to moderate regurgitation  Pulmonary artery systolic pressure was mildly increased             Review of Systems:  Review of Systems   All other systems reviewed and are negative  as described in my history of present illness        Physical Exam:  Physical Exam  Vitals signs reviewed  Constitutional:       Appearance: Normal appearance  She is well-developed  She is not ill-appearing  Comments: Not in any distress at the current time   HENT:      Head: Normocephalic and atraumatic        Right Ear: External ear normal  Left Ear: External ear normal       Nose: Nose normal       Mouth/Throat:      Pharynx: Uvula midline  Eyes:      General: Lids are normal  No scleral icterus  Extraocular Movements: Extraocular movements intact  Conjunctiva/sclera: Conjunctivae normal       Pupils: Pupils are equal, round, and reactive to light  Comments: No pallor  No cyanosis  No icterus   Neck:      Musculoskeletal: Normal range of motion and neck supple  No neck rigidity or muscular tenderness  Thyroid: No thyromegaly  Vascular: No carotid bruit or JVD  Trachea: Trachea normal       Comments: No jugular lymphadenopathy  Cardiovascular:      Rate and Rhythm: Tachycardia present  Rhythm irregular  Chest Wall: PMI is not displaced  Pulses: Normal pulses  Heart sounds: Normal heart sounds, S1 normal and S2 normal  No murmur  No friction rub  No gallop  No S3 or S4 sounds  Pulmonary:      Effort: Pulmonary effort is normal  No accessory muscle usage or respiratory distress  Breath sounds: Normal breath sounds  No decreased breath sounds, wheezing, rhonchi or rales  Chest:      Chest wall: No tenderness  Abdominal:      General: Abdomen is flat  Bowel sounds are normal  There is no distension  Palpations: Abdomen is soft  There is no hepatomegaly, splenomegaly or mass  Tenderness: There is no abdominal tenderness  Musculoskeletal: Normal range of motion  General: No swelling, tenderness or deformity  Lymphadenopathy:      Cervical: No cervical adenopathy  Skin:     Coloration: Skin is not jaundiced or pale  Findings: No abrasion, bruising, erythema, lesion or rash  Nails: There is no clubbing  Neurological:      Mental Status: She is alert and oriented to person, place, and time  Mental status is at baseline  Cranial Nerves: No cranial nerve deficit        Comments: Facial symmetry is retained  Extraocular movements are retained  Head neck tongue and palate movement are retained and symmetric   Psychiatric:         Mood and Affect: Mood normal          Speech: Speech normal          Behavior: Behavior normal          Thought Content: Thought content normal          Discussion/Summary:  1   Paroxysmal atrial fibrillation  Patient was having recurrent episodes of atrial fibrillation, RVR, diastolic heart failure   She underwent PVI for paroxysmal atrial fibrillation   She did developed torsades on the operating table when her heart rate was low and she was on sotalol  Postprocedure she was left on flecainide   She did well for some time   She is back in atrial fibrillation with RVR and asymptomatic       We had a very detailed discussion as far as management of atrial fibrillation   my detailed recommendation for this patient are as follows         1 - natural history of disease   atrial fibrillation is a disease of age   prevalence is 1% in the 4th decade , 2-3% by age 72 and 12-13% by age 80   since it increases with age , definitive therapy is indicated         2 - sleep apnea    No significant sleep apnea in 2016        3 - thyroid function   hyperthyroidism is a common precipitator of atrial fibrillation   TSH - 1 759 in Sept 2018  Recently underwent therapy for thyroid cancer         4 -Aggressive management of  hypertension - on amlodepin  diabetes mellitus   heart failure - LVEF =60%        5 - anticoagulation   patient's chads Vasc score is -3  hypertension   age   gender      Is on apixaban     6 - rate control medication   beta-blocker effect of sotalol           7 - rhythm control medication   Sotalol caused torsades  Flecainide cause AFib to go into flutter with a much more rapid rate     Patient is being started on amiodarone  200 mg 3 times a day loading for 2 weeks, then 200 mg daily  Check TSH, CMP, PFT with DLCO and eye evaluation        8 - ablation   Patient has already undergone PVI        Summary of my recommendation for the patient   Plan to use amiodarone for 6 months   Thereafter will discontinue amiodarone   If patient has recurrence of atrial flutter fib will proceed with repeat ablation   This time will add flutter line on the right and posterior wall isolation of the left           2  Hypertension  Currently reasonably controlled   Losartan, metoprolol  118/74           3  Thyroid cancer   Recently underwent surgery   Check TSH   Need to follow thyroid status closely, hyperthyroidism can precipitate atrial fibrillation        4  Hyperlipidemia   On zetia         5   Long term anticoagulation  CHADSVASc -3  On apixaban         Summary of my recommendation for the patient  Check thyroid status closely, hyperthyroidism can precipitate atrial fibrillation    Plan to use amiodarone for 6 months   Thereafter will discontinue amiodarone   If patient has recurrence of atrial flutter fib will proceed with repeat ablation   This time will add flutter line on the right and posterior wall isolation of the left

## 2021-04-19 NOTE — LETTER
April 19, 2021     Zaida Sanchez MD  [address] [place] [address]    Patient: Dalia Wolf   YOB: 1945   Date of Visit: 4/19/2021       Dear Dr Marley Sacks:    Thank you for referring Latrice Frank to me for evaluation  Below are my notes for this consultation  If you have questions, please do not hesitate to call me  I look forward to following your patient along with you  Sincerely,        Kristi Young MD        CC: Jayesh Peña MD  4/19/2021  7:42 PM  Sign when Signing Visit                                             Cardiology Follow Up    Dalia Wolf  1945  [id number]  [address]  [facility]  [phone]    1  Paroxysmal atrial fibrillation (HCC)  POCT ECG   2  Malignant neoplasm of thyroid gland ([address] )     3  Essential hypertension     4  Chronic diastolic CHF (congestive heart failure) ([facility] [address] )     5  Current use of long term anticoagulation     6  Stress due to illness of family member     9   S/P placement of cardiac pacemaker         Interval History:   The patient underwent comprehensive ablation for paroxysmal atrial fibrillation  During the procedure she went into ventricular fibrillation-she was bradycardic and on sotalol  Post ablation procedures she also underwent pacemaker placement    Patient was doing well for sometime  However she has recurrence of atrial fibrillation with RVR  She is symptomatic with the palpitation    Prior to ablation she was having recurrent episodes of AFib with RVR resulting in diastolic heart failure      She is not complaining of anginal like chest pain  She is not complaining of worsening orthopnea or PND  She is not complaining of worsening leg swelling    She does feel the palpitation and it is uncomfortable   Heart rate has disc  after discontinuing flecainide    Patient is a never smoker   She does not abuse alcohol  She does not use recreational drugs   She does not use energy drinks     She does not have a history of snoring   She does not have history of morning fatigue or daytime sleepiness     There is a family history of diabetes, coronary artery disease, stroke      prior medical history  The patient is a pleasant 79-year-old lady, with a history of A fib    This has been present for at least 6 years, but there has been a recent increase in frequency and duration  Was started on sotalol 80 bid about 3 years ago     Predominant symptom is palpitation and occasional shortness of breath and chest pain  There has been no episode of presyncope or syncope  She does not complain of orthopnea or paroxysmal nocturnal dyspnea  Patient does not have any leg swelling     she does have a history of snoring, morning fatigability and daytime sleepiness  However sleep study was negative for significant AKIN     The patient does have a history of knee replacement and has significant arthritis of her other knee     She has minimal episodes after being on higher dose of sotalol     She takes care of her  and cannot stay too long in hospital      /74 (BP Location: Left arm, Patient Position: Sitting, Cuff Size: Adult)   Pulse 98   Ht 5' 2" (1 575 m)   Wt 69 4 kg (153 lb)   LMP 02/01/1990 (Within Weeks)   BMI 27 98 kg/m²       Patient Active Problem List   Diagnosis    Essential hypertension    Allergic rhinitis    Fibromyalgia    Hyperlipidemia    Insomnia    Lumbar spondylosis    Lumbar stenosis    Osteoarthrosis, hand    Osteoarthritis of knee    Osteopenia    Paroxysmal atrial fibrillation (HCC)    Peripheral neuropathy    Restless legs syndrome    TMJ syndrome    Vitamin D deficiency    Osteoarthritis of shoulder    Carpal tunnel syndrome on right    Arthritis of both acromioclavicular joints    Chronic rhinitis    Chronic diastolic CHF (congestive heart failure) ([address] )    Stress due to illness of family member    Malignant neoplasm of thyroid gland ([facility] [address] )    Current use of long term anticoagulation    S/P placement of cardiac pacemaker    Tremors of nervous system    Hx of diplopia     Past Medical History:   Diagnosis Date    Actinic keratosis     last assessed - 06Jun2014    Arthritis     Atrial fibrillation with rapid ventricular response (HCC)     last assessed - 26Apr2016    Basal cell carcinoma     Benign colon polyp     last assessed - 27Apr2015    Disease of thyroid gland     Effusion of knee joint right     Resolved - 19Apr2016    Esophageal reflux     Fibromyalgia     last assessed - 27Dec2017    Fibromyalgia, primary     GERD (gastroesophageal reflux disease)     Hypertension     Palpitations     last assessed - 30Apr2013    Peroneal tendonitis, right     last assessed - 02Oct2013    Right lumbar radiculopathy 3/17/2016    Thyroid nodule     Trochanteric bursitis of left hip 3/9/2018     Social History     Socioeconomic History    Marital status: /Civil Union     Spouse name: Not on file    Number of children: Not on file    Years of education: Not on file    Highest education level: Not on file   Occupational History    Not on file   Social Needs    Financial resource strain: Not on file    Food insecurity     Worry: Not on file     Inability: Not on file   Gameology needs     Medical: Not on file     Non-medical: Not on file   Tobacco Use    Smoking status: Never Smoker    Smokeless tobacco: Never Used   Substance and Sexual Activity    Alcohol use: Not Currently     Drinks per session: 1 or 2     Comment: occosional - every 3 months    Drug use: Never    Sexual activity: Not Currently   Lifestyle    Physical activity     Days per week: Not on file     Minutes per session: Not on file    Stress: Not on file   Relationships    Social connections     Talks on phone: Not on file     Gets together: Not on file     Attends Jew service: Not on file     Active member of club or organization: Not on file     Attends meetings of clubs or organizations: Not on file     Relationship status: Not on file    Intimate partner violence     Fear of current or ex partner: Not on file     Emotionally abused: Not on file     Physically abused: Not on file     Forced sexual activity: Not on file   Other Topics Concern    Not on file   Social History Narrative    Not on file      Family History   Problem Relation Age of Onset    Heart disease Mother     Diabetes Mother     Heart disease Father     Coronary artery disease Father     Stroke Father         cerebrovascular accident    Heart attack Father         myocardial infarction    Sudden death Father         scd    Other Family         Back disorder    Coronary artery disease Family     Neuropathy Family     Osteoporosis Family     No Known Problems Daughter     No Known Problems Maternal Grandmother     No Known Problems Maternal Grandfather     No Known Problems Paternal Grandmother     No Known Problems Paternal Grandfather     Cancer Maternal Uncle     Breast cancer Maternal Aunt 72    No Known Problems Son     No Known Problems Maternal Aunt     No Known Problems Maternal Aunt     No Known Problems Maternal Aunt     No Known Problems Paternal Aunt     No Known Problems Paternal Aunt     Anuerysm Neg Hx     Clotting disorder Neg Hx     Arrhythmia Neg Hx     Heart failure Neg Hx      Past Surgical History:   Procedure Laterality Date    CATARACT EXTRACTION Bilateral     COLONOSCOPY  03/2018    EYE SURGERY      HYSTERECTOMY      JOINT REPLACEMENT Left     knee    OR REVISE MEDIAN N/CARPAL TUNNEL SURG Right 11/14/2019    Procedure: RELEASE CARPAL TUNNEL;  Surgeon: Shamir Oh MD;  [facility];  Service: Orthopedics    OR THYROID LOBECTOMY,UNILAT Left 12/16/2020    Procedure: Left THYROID LOBECTOMY;  Surgeon: Skylar Aguilar MD;  [facility]; Service: ENT    RECTAL POLYPECTOMY      REPLACEMENT TOTAL KNEE Left     last assessed - 27Apr2015    TONSILLECTOMY      TOTAL ABDOMINAL HYSTERECTOMY      TUBAL LIGATION      US GUIDED THYROID BIOPSY  10/14/2020       Current Outpatient Medications:     apixaban (ELIQUIS) 5 mg, Take 1 tablet (5 mg total) by mouth 2 (two) times a day, Disp: 180 tablet, Rfl: 0    ascorbic acid (VITAMIN C) 500 mg tablet, Take 500 mg by mouth daily , Disp: , Rfl:     Cholecalciferol ([employer] VITAMIN D) 2000 UNITS CAPS, Take 2,000 Units by mouth 2 (two) times a day  , Disp: , Rfl:     escitalopram (LEXAPRO) 5 mg tablet, Take 1 tablet (5 mg total) by mouth daily, Disp: 30 tablet, Rfl: 2    ezetimibe (ZETIA) 10 mg tablet, TAKE 1 TABLET BY MOUTH  DAILY, Disp: 90 tablet, Rfl: 3    famotidine (PEPCID) 20 mg tablet, Take 20 mg by mouth daily  , Disp: , Rfl:     gabapentin (NEURONTIN) 300 mg capsule, TAKE 2 CAPSULES BY MOUTH AT BEDTIME, Disp: 180 capsule, Rfl: 4    losartan (COZAAR) 50 mg tablet, Take 1 tablet (50 mg total) by mouth daily, Disp: 90 tablet, Rfl: 1    metoprolol succinate (TOPROL-XL) 25 mg 24 hr tablet, Take 1 tablet (25 mg total) by mouth daily (Patient taking differently: Take 50 mg by mouth daily ), Disp: 14 tablet, Rfl: 3    rOPINIRole (REQUIP) 0 25 mg tablet, TAKE 1 TABLET BY MOUTH  DAILY AT BEDTIME, Disp: 90 tablet, Rfl: 3    traMADol (ULTRAM) 50 mg tablet, TAKE 1 TABLET BY MOUTH  TWICE DAILY AS NEEDED FOR  PAIN, Disp: 180 tablet, Rfl: 0    TURMERIC PO, Take 1 capsule by mouth 2 (two) times a day, Disp: , Rfl:     zolpidem (AMBIEN) 10 mg tablet, TAKE 1 TABLET BY MOUTH  DAILY AT BEDTIME AS NEEDED  FOR SLEEP, Disp: 90 tablet, Rfl: 0    acetaminophen (TYLENOL) 650 mg CR tablet, Take 1 tablet (650 mg total) by mouth every 6 (six) hours as needed for mild pain or moderate pain (Patient not taking: Reported on 4/19/2021), Disp: 30 tablet, Rfl: 0    amiodarone 200 mg tablet, Take 1 tablet (200 mg total) by mouth 3 (three) times a day, Disp: 42 tablet, Rfl: 0    amiodarone 200 mg tablet, Take 1 tablet (200 mg total) by mouth daily, Disp: 30 tablet, Rfl: 5    Chromium Picolinate (CHROMIUM PICOLATE PO), Take 1 tablet by mouth daily, Disp: , Rfl:   Allergies   Allergen Reactions    Sotalol Other (See Comments)     Prolonged QTc leading to torsades de pointes     Penicillins Other (See Comments)     As a child calcium deposit in the arm     Ace Inhibitors GI Intolerance     Did feel well on it       Labs:  Lab Results   Component Value Date     05/06/2015    K 4 4 04/01/2021    K 4 8 05/06/2015     04/01/2021    CL 99 (L) 05/06/2015    CO2 31 04/01/2021    CO2 28 05/06/2015    BUN 27 (H) 04/01/2021    BUN 19 05/06/2015    CREATININE 0 96 04/01/2021    CREATININE 0 97 05/06/2015    GLUCOSE 114 05/06/2015    CALCIUM 9 4 04/01/2021    CALCIUM 9 0 05/06/2015     Lab Results   Component Value Date    CKTOTAL 100 07/10/2014    TROPONINI <0 02 01/08/2021     Lab Results   Component Value Date    WBC 6 67 04/01/2021    WBC 6 63 05/06/2015    HGB 12 6 04/01/2021    HGB 13 3 05/06/2015    HCT 39 8 04/01/2021    HCT 39 3 05/06/2015    MCV 98 04/01/2021    MCV 93 05/06/2015     04/01/2021     05/06/2015     Lab Results   Component Value Date    CHOL 205 05/06/2015    TRIG 112 04/01/2021    TRIG 90 05/06/2015    HDL 66 04/01/2021    HDL 53 05/06/2015     Imaging: No results found  ambulatory event monitor   October 2020                echocardiogram   January 2021  LEFT VENTRICLE:  Systolic function was normal  Ejection fraction was estimated to be 55 %  There were no regional wall motion abnormalities    Wall thickness was at the upper limits of normal   Doppler parameters were consistent with elevated ventricular end-diastolic filling pressure      LEFT ATRIUM:  The atrium was mildly to moderately dilated      RIGHT ATRIUM:  The atrium was mildly dilated      MITRAL VALVE:  There was mild stenosis      TRICUSPID VALVE:  There was mild to moderate regurgitation  Pulmonary artery systolic pressure was mildly increased             Review of Systems:  Review of Systems   All other systems reviewed and are negative  as described in my history of present illness        Physical Exam:  Physical Exam  Vitals signs reviewed  Constitutional:       Appearance: Normal appearance  She is well-developed  She is not ill-appearing  Comments: Not in any distress at the current time   HENT:      Head: Normocephalic and atraumatic  Right Ear: External ear normal       Left Ear: External ear normal       Nose: Nose normal       Mouth/Throat:      Pharynx: Uvula midline  Eyes:      General: Lids are normal  No scleral icterus  Extraocular Movements: Extraocular movements intact  Conjunctiva/sclera: Conjunctivae normal       Pupils: Pupils are equal, round, and reactive to light  Comments: No pallor  No cyanosis  No icterus   Neck:      Musculoskeletal: Normal range of motion and neck supple  No neck rigidity or muscular tenderness  Thyroid: No thyromegaly  Vascular: No carotid bruit or JVD  Trachea: Trachea normal       Comments: No jugular lymphadenopathy  Cardiovascular:      Rate and Rhythm: Tachycardia present  Rhythm irregular  Chest Wall: PMI is not displaced  Pulses: Normal pulses  Heart sounds: Normal heart sounds, S1 normal and S2 normal  No murmur  No friction rub  No gallop  No S3 or S4 sounds  Pulmonary:      Effort: Pulmonary effort is normal  No accessory muscle usage or respiratory distress  Breath sounds: Normal breath sounds  No decreased breath sounds, wheezing, rhonchi or rales  Chest:      Chest wall: No tenderness  Abdominal:      General: Abdomen is flat  Bowel sounds are normal  There is no distension  Palpations: Abdomen is soft  There is no hepatomegaly, splenomegaly or mass  Tenderness:  There is no abdominal tenderness  Musculoskeletal: Normal range of motion  General: No swelling, tenderness or deformity  Lymphadenopathy:      Cervical: No cervical adenopathy  Skin:     Coloration: Skin is not jaundiced or pale  Findings: No abrasion, bruising, erythema, lesion or rash  Nails: There is no clubbing  Neurological:      Mental Status: She is alert and oriented to person, place, and time  Mental status is at baseline  Cranial Nerves: No cranial nerve deficit  Comments: Facial symmetry is retained  Extraocular movements are retained  Head neck tongue and palate movement are retained and symmetric   Psychiatric:         Mood and Affect: Mood normal          Speech: Speech normal          Behavior: Behavior normal          Thought Content: Thought content normal          Discussion/Summary:  1   Paroxysmal atrial fibrillation  Patient was having recurrent episodes of atrial fibrillation, RVR, diastolic heart failure   She underwent PVI for paroxysmal atrial fibrillation   She did developed torsades on the operating table when her heart rate was low and she was on sotalol  Postprocedure she was left on flecainide   She did well for some time   She is back in atrial fibrillation with RVR and asymptomatic       We had a very detailed discussion as far as management of atrial fibrillation   my detailed recommendation for this patient are as follows         1 - natural history of disease   atrial fibrillation is a disease of age   prevalence is 1% in the 4th decade , 2-3% by age 72 and 12-13% by age 80   since it increases with age , definitive therapy is indicated         2 - sleep apnea    No significant sleep apnea in 2016        3 - thyroid function   hyperthyroidism is a common precipitator of atrial fibrillation   TSH - 1 759 in Sept 2018  Recently underwent therapy for thyroid cancer         4 -Aggressive management of  hypertension - on amlodepin  diabetes mellitus   heart failure - LVEF =60%        5 - anticoagulation   patient's chads Vasc score is -3  hypertension   age   gender      Is on apixaban     6 - rate control medication   beta-blocker effect of sotalol           7 - rhythm control medication   Sotalol caused torsades  Flecainide cause AFib to go into flutter with a much more rapid rate     Patient is being started on amiodarone  200 mg 3 times a day loading for 2 weeks, then 200 mg daily  Check TSH, CMP, PFT with DLCO and eye evaluation        8 - ablation   Patient has already undergone PVI        Summary of my recommendation for the patient   Plan to use amiodarone for 6 months   Thereafter will discontinue amiodarone   If patient has recurrence of atrial flutter fib will proceed with repeat ablation   This time will add flutter line on the right and posterior wall isolation of the left           2  Hypertension  Currently reasonably controlled   Losartan, metoprolol  118/74           3  Thyroid cancer   Recently underwent surgery   Check TSH   Need to follow thyroid status closely, hyperthyroidism can precipitate atrial fibrillation        4  Hyperlipidemia   On zetia         5   Long term anticoagulation  CHADSVASc -3  On apixaban         Summary of my recommendation for the patient  Check thyroid status closely, hyperthyroidism can precipitate atrial fibrillation    Plan to use amiodarone for 6 months   Thereafter will discontinue amiodarone   If patient has recurrence of atrial flutter fib will proceed with repeat ablation   This time will add flutter line on the right and posterior wall isolation of the left

## 2021-04-20 PROBLEM — D34 HURTHLE CELL ADENOMA OF THYROID: Status: ACTIVE | Noted: 2021-04-20

## 2021-04-26 DIAGNOSIS — F43.22 ADJUSTMENT REACTION WITH ANXIETY: ICD-10-CM

## 2021-04-26 RX ORDER — ESCITALOPRAM OXALATE 5 MG/1
TABLET ORAL
Qty: 30 TABLET | Refills: 2 | Status: SHIPPED | OUTPATIENT
Start: 2021-04-26 | End: 2021-05-06

## 2021-04-28 ENCOUNTER — TELEPHONE (OUTPATIENT)
Dept: CARDIOLOGY CLINIC | Facility: CLINIC | Age: 76
End: 2021-04-28

## 2021-05-06 ENCOUNTER — TELEMEDICINE (OUTPATIENT)
Dept: FAMILY MEDICINE CLINIC | Facility: CLINIC | Age: 76
End: 2021-05-06
Payer: MEDICARE

## 2021-05-06 DIAGNOSIS — E78.2 MIXED HYPERLIPIDEMIA: ICD-10-CM

## 2021-05-06 DIAGNOSIS — C73 MALIGNANT NEOPLASM OF THYROID GLAND (HCC): ICD-10-CM

## 2021-05-06 DIAGNOSIS — R25.1 TREMORS OF NERVOUS SYSTEM: ICD-10-CM

## 2021-05-06 DIAGNOSIS — D34 HURTHLE CELL ADENOMA OF THYROID: ICD-10-CM

## 2021-05-06 DIAGNOSIS — I48.0 PAROXYSMAL ATRIAL FIBRILLATION (HCC): ICD-10-CM

## 2021-05-06 DIAGNOSIS — Z63.79 STRESS DUE TO ILLNESS OF FAMILY MEMBER: ICD-10-CM

## 2021-05-06 DIAGNOSIS — I10 ESSENTIAL HYPERTENSION: ICD-10-CM

## 2021-05-06 DIAGNOSIS — I50.32 CHRONIC DIASTOLIC CHF (CONGESTIVE HEART FAILURE) (HCC): ICD-10-CM

## 2021-05-06 DIAGNOSIS — Z00.00 MEDICARE ANNUAL WELLNESS VISIT, SUBSEQUENT: Primary | ICD-10-CM

## 2021-05-06 DIAGNOSIS — F43.22 ADJUSTMENT REACTION WITH ANXIETY: ICD-10-CM

## 2021-05-06 PROCEDURE — G0439 PPPS, SUBSEQ VISIT: HCPCS | Performed by: FAMILY MEDICINE

## 2021-05-06 PROCEDURE — 1123F ACP DISCUSS/DSCN MKR DOCD: CPT | Performed by: FAMILY MEDICINE

## 2021-05-06 RX ORDER — ESCITALOPRAM OXALATE 10 MG/1
10 TABLET ORAL DAILY
Qty: 30 TABLET | Refills: 2 | Status: SHIPPED | OUTPATIENT
Start: 2021-05-06 | End: 2021-08-02

## 2021-05-06 NOTE — PATIENT INSTRUCTIONS
Medicare Preventive Visit Patient Instructions  Thank you for completing your Welcome to Medicare Visit or Medicare Annual Wellness Visit today  Your next wellness visit will be due in one year (5/7/2022)  The screening/preventive services that you may require over the next 5-10 years are detailed below  Some tests may not apply to you based off risk factors and/or age  Screening tests ordered at today's visit but not completed yet may show as past due  Also, please note that scanned in results may not display below  Preventive Screenings:  Service Recommendations Previous Testing/Comments   Colorectal Cancer Screening  * Colonoscopy    * Fecal Occult Blood Test (FOBT)/Fecal Immunochemical Test (FIT)  * Fecal DNA/Cologuard Test  * Flexible Sigmoidoscopy Age: 54-65 years old   Colonoscopy: every 10 years (may be performed more frequently if at higher risk)  OR  FOBT/FIT: every 1 year  OR  Cologuard: every 3 years  OR  Sigmoidoscopy: every 5 years  Screening may be recommended earlier than age 48 if at higher risk for colorectal cancer  Also, an individualized decision between you and your healthcare provider will decide whether screening between the ages of 74-80 would be appropriate  Colonoscopy: 04/03/2018  FOBT/FIT: Not on file  Cologuard: Not on file  Sigmoidoscopy: Not on file          Breast Cancer Screening Age: 36 years old  Frequency: every 1-2 years  Not required if history of left and right mastectomy Mammogram: 10/04/2019    Screening Current   Cervical Cancer Screening Between the ages of 21-29, pap smear recommended once every 3 years  Between the ages of 33-67, can perform pap smear with HPV co-testing every 5 years     Recommendations may differ for women with a history of total hysterectomy, cervical cancer, or abnormal pap smears in past  Pap Smear: 09/15/2020    Screening Not Indicated   Hepatitis C Screening Once for adults born between 1945 and 1965  More frequently in patients at high risk for Hepatitis C Hep C Antibody: Not on file        Diabetes Screening 1-2 times per year if you're at risk for diabetes or have pre-diabetes Fasting glucose: 88 mg/dL   A1C: 6 5 %    Screening Current   Cholesterol Screening Once every 5 years if you don't have a lipid disorder  May order more often based on risk factors  Lipid panel: 04/01/2021    Screening Not Indicated  History Lipid Disorder     Other Preventive Screenings Covered by Medicare:  1  Abdominal Aortic Aneurysm (AAA) Screening: covered once if your at risk  You're considered to be at risk if you have a family history of AAA  2  Lung Cancer Screening: covers low dose CT scan once per year if you meet all of the following conditions: (1) Age 50-69; (2) No signs or symptoms of lung cancer; (3) Current smoker or have quit smoking within the last 15 years; (4) You have a tobacco smoking history of at least 30 pack years (packs per day multiplied by number of years you smoked); (5) You get a written order from a healthcare provider  3  Glaucoma Screening: covered annually if you're considered high risk: (1) You have diabetes OR (2) Family history of glaucoma OR (3)  aged 48 and older OR (3)  American aged 72 and older  3  Osteoporosis Screening: covered every 2 years if you meet one of the following conditions: (1) You're estrogen deficient and at risk for osteoporosis based off medical history and other findings; (2) Have a vertebral abnormality; (3) On glucocorticoid therapy for more than 3 months; (4) Have primary hyperparathyroidism; (5) On osteoporosis medications and need to assess response to drug therapy  · Last bone density test (DXA Scan): 09/14/2018  5  HIV Screening: covered annually if you're between the age of 12-76  Also covered annually if you are younger than 13 and older than 72 with risk factors for HIV infection   For pregnant patients, it is covered up to 3 times per pregnancy  Immunizations:  Immunization Recommendations   Influenza Vaccine Annual influenza vaccination during flu season is recommended for all persons aged >= 6 months who do not have contraindications   Pneumococcal Vaccine (Prevnar and Pneumovax)  * Prevnar = PCV13  * Pneumovax = PPSV23   Adults 25-60 years old: 1-3 doses may be recommended based on certain risk factors  Adults 72 years old: Prevnar (PCV13) vaccine recommended followed by Pneumovax (PPSV23) vaccine  If already received PPSV23 since turning 65, then PCV13 recommended at least one year after PPSV23 dose  Hepatitis B Vaccine 3 dose series if at intermediate or high risk (ex: diabetes, end stage renal disease, liver disease)   Tetanus (Td) Vaccine - COST NOT COVERED BY MEDICARE PART B Following completion of primary series, a booster dose should be given every 10 years to maintain immunity against tetanus  Td may also be given as tetanus wound prophylaxis  Tdap Vaccine - COST NOT COVERED BY MEDICARE PART B Recommended at least once for all adults  For pregnant patients, recommended with each pregnancy  Shingles Vaccine (Shingrix) - COST NOT COVERED BY MEDICARE PART B  2 shot series recommended in those aged 48 and above     Health Maintenance Due:  There are no preventive care reminders to display for this patient  Immunizations Due:  There are no preventive care reminders to display for this patient  Advance Directives   What are advance directives? Advance directives are legal documents that state your wishes and plans for medical care  These plans are made ahead of time in case you lose your ability to make decisions for yourself  Advance directives can apply to any medical decision, such as the treatments you want, and if you want to donate organs  What are the types of advance directives? There are many types of advance directives, and each state has rules about how to use them   You may choose a combination of any of the following:  · Living will: This is a written record of the treatment you want  You can also choose which treatments you do not want, which to limit, and which to stop at a certain time  This includes surgery, medicine, IV fluid, and tube feedings  · Durable power of  for healthcare [facility]): This is a written record that states who you want to make healthcare choices for you when you are unable to make them for yourself  This person, called a proxy, is usually a family member or a friend  You may choose more than 1 proxy  · Do not resuscitate (DNR) order:  A DNR order is used in case your heart stops beating or you stop breathing  It is a request not to have certain forms of treatment, such as CPR  A DNR order may be included in other types of advance directives  · Medical directive: This covers the care that you want if you are in a coma, near death, or unable to make decisions for yourself  You can list the treatments you want for each condition  Treatment may include pain medicine, surgery, blood transfusions, dialysis, IV or tube feedings, and a ventilator (breathing machine)  · Values history: This document has questions about your views, beliefs, and how you feel and think about life  This information can help others choose the care that you would choose  Why are advance directives important? An advance directive helps you control your care  Although spoken wishes may be used, it is better to have your wishes written down  Spoken wishes can be misunderstood, or not followed  Treatments may be given even if you do not want them  An advance directive may make it easier for your family to make difficult choices about your care  Urinary Incontinence   Urinary incontinence (UI)  is when you lose control of your bladder  UI develops because your bladder cannot store or empty urine properly  The 3 most common types of UI are stress incontinence, urge incontinence, or both    Medicines:   · May be given to help strengthen your bladder control  Report any side effects of medication to your healthcare provider  Do pelvic muscle exercises often:  Your pelvic muscles help you stop urinating  Squeeze these muscles tight for 5 seconds, then relax for 5 seconds  Gradually work up to squeezing for 10 seconds  Do 3 sets of 15 repetitions a day, or as directed  This will help strengthen your pelvic muscles and improve bladder control  Train your bladder:  Go to the bathroom at set times, such as every 2 hours, even if you do not feel the urge to go  You can also try to hold your urine when you feel the urge to go  For example, hold your urine for 5 minutes when you feel the urge to go  As that becomes easier, hold your urine for 10 minutes  Self-care:   · Keep a UI record  Write down how often you leak urine and how much you leak  Make a note of what you were doing when you leaked urine  · Drink liquids as directed  You may need to limit the amount of liquid you drink to help control your urine leakage  Do not drink any liquid right before you go to bed  Limit or do not have drinks that contain caffeine or alcohol  · Prevent constipation  Eat a variety of high-fiber foods  Good examples are high-fiber cereals, beans, vegetables, and whole-grain breads  Walking is the best way to trigger your intestines to have a bowel movement  · Exercise regularly and maintain a healthy weight  Weight loss and exercise will decrease pressure on your bladder and help you control your leakage  · Use a catheter as directed  to help empty your bladder  A catheter is a tiny, plastic tube that is put into your bladder to drain your urine  · Go to behavior therapy as directed  Behavior therapy may be used to help you learn to control your urge to urinate      Weight Management   Why it is important to manage your weight:  Being overweight increases your risk of health conditions such as heart disease, high blood pressure, type 2 diabetes, and certain types of cancer  It can also increase your risk for osteoarthritis, sleep apnea, and other respiratory problems  Aim for a slow, steady weight loss  Even a small amount of weight loss can lower your risk of health problems  How to lose weight safely:  A safe and healthy way to lose weight is to eat fewer calories and get regular exercise  You can lose up about 1 pound a week by decreasing the number of calories you eat by 500 calories each day  Healthy meal plan for weight management:  A healthy meal plan includes a variety of foods, contains fewer calories, and helps you stay healthy  A healthy meal plan includes the following:  · Eat whole-grain foods more often  A healthy meal plan should contain fiber  Fiber is the part of grains, fruits, and vegetables that is not broken down by your body  Whole-grain foods are healthy and provide extra fiber in your diet  Some examples of whole-grain foods are whole-wheat breads and pastas, oatmeal, brown rice, and bulgur  · Eat a variety of vegetables every day  Include dark, leafy greens such as spinach, kale, anayeli greens, and mustard greens  Eat yellow and orange vegetables such as carrots, sweet potatoes, and winter squash  · Eat a variety of fruits every day  Choose fresh or canned fruit (canned in its own juice or light syrup) instead of juice  Fruit juice has very little or no fiber  · Eat low-fat dairy foods  Drink fat-free (skim) milk or 1% milk  Eat fat-free yogurt and low-fat cottage cheese  Try low-fat cheeses such as mozzarella and other reduced-fat cheeses  · Choose meat and other protein foods that are low in fat  Choose beans or other legumes such as split peas or lentils  Choose fish, skinless poultry (chicken or turkey), or lean cuts of red meat (beef or pork)  Before you cook meat or poultry, cut off any visible fat  · Use less fat and oil  Try baking foods instead of frying them   Add less fat, such as margarine, sour cream, regular salad dressing and mayonnaise to foods  Eat fewer high-fat foods  Some examples of high-fat foods include french fries, doughnuts, ice cream, and cakes  · Eat fewer sweets  Limit foods and drinks that are high in sugar  This includes candy, cookies, regular soda, and sweetened drinks  Exercise:  Exercise at least 30 minutes per day on most days of the week  Some examples of exercise include walking, biking, dancing, and swimming  You can also fit in more physical activity by taking the stairs instead of the elevator or parking farther away from stores  Ask your healthcare provider about the best exercise plan for you  Narcotic (Opioid) Safety    Use narcotics safely:  · Take prescribed narcotics exactly as directed  · Do not give narcotics to others or take narcotics that belong to someone else  · Do not mix narcotics without medicines or alcohol  · Do not drive or operate heavy machinery after you take the narcotic  · Monitor for side effects and notify your healthcare provider if you experienced side effects such as nausea, sleepiness, itching, or trouble thinking clearly  Manage constipation:    Constipation is the most common side effect of narcotic medicine  Constipation is when you have hard, dry bowel movements, or you go longer than usual between bowel movements  Tell your healthcare provider about all changes in your bowel movements while you are taking narcotics  He or she may recommend laxative medicine to help you have a bowel movement  He or she may also change the kind of narcotic you are taking, or change when you take it  The following are more ways you can prevent or relieve constipation:    · Drink liquids as directed  You may need to drink extra liquids to help soften and move your bowels  Ask how much liquid to drink each day and which liquids are best for you  · Eat high-fiber foods  This may help decrease constipation by adding bulk to your bowel movements  High-fiber foods include fruits, vegetables, whole-grain breads and cereals, and beans  Your healthcare provider or dietitian can help you create a high-fiber meal plan  Your provider may also recommend a fiber supplement if you cannot get enough fiber from food  · Exercise regularly  Regular physical activity can help stimulate your intestines  Walking is a good exercise to prevent or relieve constipation  Ask which exercises are best for you  · Schedule a time each day to have a bowel movement  This may help train your body to have regular bowel movements  Bend forward while you are on the toilet to help move the bowel movement out  Sit on the toilet for at least 10 minutes, even if you do not have a bowel movement  Store narcotics safely:   · Store narcotics where others cannot easily get them  Keep them in a locked cabinet or secure area  Do not  keep them in a purse or other bag you carry with you  A person may be looking for something else and find the narcotics  · Make sure narcotics are stored out of the reach of children  A child can easily overdose on narcotics  Narcotics may look like candy to a small child  The best way to dispose of narcotics: The laws vary by country and area  In the [place], the best way is to return the narcotics through a take-back program  This program is offered by the [employer])  The following are options for using the program:  · Take the narcotics to a RAN collection site  The site is often a law enforcement center  Call your local law enforcement center for scheduled take-back days in your area  You will be given information on where to go if the collection site is in a different location  · Take the narcotics to an approved pharmacy or hospital   A pharmacy or hospital may be set up as a collection site  You will need to ask if it is a RAN collection site if you were not directed there   A pharmacy or doctor's office may not be able to take back narcotics unless it is a RAN site  · Use a mail-back system  This means you are given containers to put the narcotics into  You will then mail them in the containers  · Use a take-back drop box  This is a place to leave the narcotics at any time  People and animals will not be able to get into the box  Your local law enforcement agency can tell you where to find a drop box in your area  Other ways to manage pain:   · Ask your healthcare provider about non-narcotic medicines to control pain  Nonprescription medicines include NSAIDs (such as ibuprofen) and acetaminophen  Prescription medicines include muscle relaxers, antidepressants, and steroids  · Pain may be managed without any medicines  Some ways to relieve pain include massage, aromatherapy, or meditation  Physical or occupational therapy may also help  For more information:   · Drug Enforcement Administration  [address] [place] [address] [address]  Phone: 3- 344 - 794-3447  Web Address: [facility]/drug_disposal/    · [address]  and Drug Administration  [address]  Phone: 2- 351 - 664-7583  Web Address: http://[employer]/     © Copyright [employer] 2018 Information is for End User's use only and may not be sold, redistributed or otherwise used for commercial purposes   All illustrations and images included in CareNotes® are the copyrighted property of [employer]  or [address] [place] [address]

## 2021-05-06 NOTE — PROGRESS NOTES
Assessment and Plan:     Problem List Items Addressed This Visit        Endocrine    Malignant neoplasm of thyroid gland ([facility] [address] )    Hurthle cell adenoma of thyroid       Cardiovascular and Mediastinum    Essential hypertension    Paroxysmal atrial fibrillation (HCC)    Chronic diastolic CHF (congestive heart failure) ([facility] [address] )       Other    Hyperlipidemia    Stress due to illness of family member    Tremors of nervous system      Other Visit Diagnoses     Medicare annual wellness visit, subsequent    -  Primary           Preventive health issues were discussed with patient, and age appropriate screening tests were ordered as noted in patient's After Visit Summary  Personalized health advice and appropriate referrals for health education or preventive services given if needed, as noted in patient's After Visit Summary  Increase dose of Lexapro to 10 mg daily   Start oral vitamin B 12 1,000 mcg daily  Follow up with multiple specialists         History of Present Illness:     Patient presents for Medicare Annual Wellness visit    Patient Care Team:  Francis Quintanilla MD as PCP - General  SHANE Culver DO Roddie Clonts, MD Jerona Inks, MD Josiane Montoya MD (Pain Medicine)  JENNY Winkler (Pain Medicine)     Problem List:     Patient Active Problem List   Diagnosis    Essential hypertension    Allergic rhinitis    Fibromyalgia    Hyperlipidemia    Insomnia    Lumbar spondylosis    Lumbar stenosis    Osteoarthrosis, hand    Osteoarthritis of knee    Osteopenia    Paroxysmal atrial fibrillation (HCC)    Peripheral neuropathy    Restless legs syndrome    TMJ syndrome    Vitamin D deficiency    Osteoarthritis of shoulder    Carpal tunnel syndrome on right    Arthritis of both acromioclavicular joints    Chronic rhinitis    Chronic diastolic CHF (congestive heart failure) ([facility] [address] )    Stress due to illness of family member    Malignant neoplasm of thyroid gland ([address] )    Current use of long term anticoagulation    S/P placement of cardiac pacemaker    Tremors of nervous system    Hx of diplopia    Hurthle cell adenoma of thyroid      Past Medical and Surgical History:     Past Medical History:   Diagnosis Date    Actinic keratosis     last assessed - 06Jun2014    Arthritis     Atrial fibrillation with rapid ventricular response (HCC)     last assessed - 26Apr2016    Basal cell carcinoma     Benign colon polyp     last assessed - 27Apr2015    Disease of thyroid gland     Effusion of knee joint right     Resolved - 19Apr2016    Esophageal reflux     Fibromyalgia     last assessed - [id number]    Fibromyalgia, primary     GERD (gastroesophageal reflux disease)     Hypertension     Palpitations     last assessed - 30Apr2013    Peroneal tendonitis, right     last assessed - 02Oct2013    Right lumbar radiculopathy 3/17/2016    Thyroid nodule     Trochanteric bursitis of left hip 3/9/2018     Past Surgical History:   Procedure Laterality Date    CATARACT EXTRACTION Bilateral     COLONOSCOPY  03/2018    EYE SURGERY      HYSTERECTOMY      JOINT REPLACEMENT Left     knee    SD REVISE MEDIAN N/CARPAL TUNNEL SURG Right 11/14/2019    Procedure: RELEASE CARPAL TUNNEL;  Surgeon: Casey Velazquez MD;  [facility];  Service: Orthopedics    SD THYROID LOBECTOMY,UNILAT Left 12/16/2020    Procedure: Left THYROID LOBECTOMY;  Surgeon: Michaelle Worthington MD;  [facility];  Service: ENT    RECTAL POLYPECTOMY      REPLACEMENT TOTAL KNEE Left     last assessed - 27Apr2015    TONSILLECTOMY      TOTAL ABDOMINAL HYSTERECTOMY      TUBAL LIGATION      US GUIDED THYROID BIOPSY  10/14/2020      Family History:     Family History   Problem Relation Age of Onset    Heart disease Mother     Diabetes Mother     Heart disease Father     Coronary artery disease Father     Stroke Father         cerebrovascular accident    Heart attack Father myocardial infarction    Sudden death Father         scd    Other Family         Back disorder    Coronary artery disease Family     Neuropathy Family     Osteoporosis Family     No Known Problems Daughter     No Known Problems Maternal Grandmother     No Known Problems Maternal Grandfather     No Known Problems Paternal Grandmother     No Known Problems Paternal Grandfather     Cancer Maternal Uncle     Breast cancer Maternal Aunt 72    No Known Problems Son     No Known Problems Maternal Aunt     No Known Problems Maternal Aunt     No Known Problems Maternal Aunt     No Known Problems Paternal Aunt     No Known Problems Paternal Aunt     Anuerysm Neg Hx     Clotting disorder Neg Hx     Arrhythmia Neg Hx     Heart failure Neg Hx       Social History:     E-Cigarette/Vaping    E-Cigarette Use Never User      E-Cigarette/Vaping Substances    Nicotine No     THC No     CBD No     Flavoring No     Other No     Unknown No      Social History     Socioeconomic History    Marital status: /Civil Union     Spouse name: Not on file    Number of children: Not on file    Years of education: Not on file    Highest education level: Not on file   Occupational History    Not on file   Social Needs    Financial resource strain: Not on file    Food insecurity     Worry: Not on file     Inability: Not on file   [employer] needs     Medical: Not on file     Non-medical: Not on file   Tobacco Use    Smoking status: Never Smoker    Smokeless tobacco: Never Used   Substance and Sexual Activity    Alcohol use: Not Currently     Drinks per session: 1 or 2     Comment: occosional - every 3 months    Drug use: Never    Sexual activity: Not Currently   Lifestyle    Physical activity     Days per week: Not on file     Minutes per session: Not on file    Stress: Not on file   Relationships    Social connections     Talks on phone: Not on file     Gets together: Not on file     Attends Restorationism service: Not on file     Active member of club or organization: Not on file     Attends meetings of clubs or organizations: Not on file     Relationship status: Not on file    Intimate partner violence     Fear of current or ex partner: Not on file     Emotionally abused: Not on file     Physically abused: Not on file     Forced sexual activity: Not on file   Other Topics Concern    Not on file   Social History Narrative    Not on file      Medications and Allergies:     Current Outpatient Medications   Medication Sig Dispense Refill    acetaminophen (TYLENOL) 650 mg CR tablet Take 1 tablet (650 mg total) by mouth every 6 (six) hours as needed for mild pain or moderate pain 30 tablet 0    amiodarone 200 mg tablet Take 1 tablet (200 mg total) by mouth daily 30 tablet 5    apixaban (ELIQUIS) 5 mg Take 1 tablet (5 mg total) by mouth 2 (two) times a day 180 tablet 0    ascorbic acid (VITAMIN C) 500 mg tablet Take 500 mg by mouth daily       Cholecalciferol ([employer] VITAMIN D) 2000 UNITS CAPS Take 2,000 Units by mouth 2 (two) times a day        Chromium Picolinate (CHROMIUM PICOLATE PO) Take 1 tablet by mouth daily      escitalopram (LEXAPRO) 5 mg tablet take 1 tablet by mouth once daily 30 tablet 2    ezetimibe (ZETIA) 10 mg tablet TAKE 1 TABLET BY MOUTH  DAILY 90 tablet 3    famotidine (PEPCID) 20 mg tablet Take 20 mg by mouth daily        gabapentin (NEURONTIN) 300 mg capsule TAKE 2 CAPSULES BY MOUTH AT BEDTIME 180 capsule 4    losartan (COZAAR) 50 mg tablet Take 1 tablet (50 mg total) by mouth daily 90 tablet 1    metoprolol succinate (TOPROL-XL) 25 mg 24 hr tablet Take 1 tablet (25 mg total) by mouth daily (Patient taking differently: Take 50 mg by mouth daily ) 14 tablet 3    rOPINIRole (REQUIP) 0 25 mg tablet TAKE 1 TABLET BY MOUTH  DAILY AT BEDTIME 90 tablet 3    traMADol (ULTRAM) 50 mg tablet TAKE 1 TABLET BY MOUTH  TWICE DAILY AS NEEDED FOR  PAIN 180 tablet 0    TURMERIC PO Take 1 capsule by mouth 2 (two) times a day      zolpidem (AMBIEN) 10 mg tablet TAKE 1 TABLET BY MOUTH  DAILY AT BEDTIME AS NEEDED  FOR SLEEP 90 tablet 0     No current facility-administered medications for this visit  Allergies   Allergen Reactions    Sotalol Other (See Comments)     Prolonged QTc leading to torsades de pointes     Penicillins Other (See Comments)     As a child calcium deposit in the arm     Ace Inhibitors GI Intolerance     Did feel well on it      Immunizations:     Immunization History   Administered Date(s) Administered    INFLUENZA 12/23/2015, 11/07/2016, 10/18/2017, 12/04/2018    Influenza Split High Dose Preservative Free IM 10/01/2014, 12/23/2015, 11/07/2016, 10/18/2017    Influenza, high dose seasonal 0 7 mL 12/04/2018, 09/26/2019, 09/08/2020    Influenza, seasonal, injectable 10/16/2012    Pneumococcal Conjugate 13-Valent 04/27/2015    SARS-CoV-2 / COVID-19 mRNA IM (Shaila Sneed) 01/27/2021, 02/24/2021       Review of Systems   Constitutional: Negative for appetite change, chills, fatigue, fever and unexpected weight change  HENT: Positive for hearing loss (bilateral hearing aids )  Negative for congestion, ear pain, rhinorrhea, sore throat and trouble swallowing  Eyes: Negative for visual disturbance  Respiratory: Negative for cough, shortness of breath and wheezing  Cardiovascular: Negative for chest pain, palpitations and leg swelling  AF followed by cardiology  s/p admission 03/2021 Atrial fibrillation status post ablation procedure  Pre procedure episode of bradycardia with QT prolongation and torsades successfully defibrillated  Subsequent pacemaker placed  She was discharged on  Flecainide 100 mg BID and Metoprolol ER 25 mg daily-dose now 50 mg daily  She remains on Losartan 50 mg a day for hypertension  Blood pressures have been stable on current regimen    On Eliquis 5 mg BID  S/p admission 01/2021 with chest pressure and sensation of heart racing symptoms resolved without medications  Admission sinus bradycardia with no acute changes  Troponin x 3 negative  Chest x-ray no active disease  Patient had a previous admission 01/03/2021 to 01/05/2021 for atrial fibrillation with rapid ventricular response  Patient reverted to normal sinus rhythm    Echocardiogram showed normal left ventricular systolic function  EF 55%  Mildly to moderately dilated left atrium  Mildly dilated right atrium  Mild mitral stenosis  Mild to moderate tricuspid regurgitation  Pulmonary artery systolic pressure mildly increased    01/2021 creatinine 0 92  Electrolytes normal   Hemoglobin 14 0  Hyperlipidemia on Zetia 10 mg daily  08/2020 lipid profile cholesterol 182  Triglycerides 121  HDL 54    LFTs normal except for total bilirubin 1 03  FBS 91  6/2020 24 hour Holter monitor predominately sinus bradycardia with avg HR 59 beats/min  Low burden of PACs and PVCs  No significant pauses or advanced heart block  Admission 9/15/2018 through 9/17/2018 for atrial fibrillation with rapid ventricular response  Patient converted to normal sinus rhythm with IV Cardizem  Electrolytes normal   TSH 1 077  Hemoglobin 14 2  Troponin x 3 normal   Magnesium 2 0   Echocardiogram normal left ventricular systolic function  EF 60%  Grade 2 diastolic dysfunction  Right ventricle systolic pressure mildly increased  Mildly dilated left atrium  Mild MR/TR  No pericardial effusion     Gastrointestinal: Negative for abdominal pain, blood in stool, constipation, diarrhea, nausea and vomiting  Colonoscopy 04/2018 Two benign polyps  Mild diverticulosis left-sided colon  Endocrine: Negative for polydipsia and polyuria  12/2020 status post left thyroid lobectomy for left thyroid nodule  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  09/2018 DEXA scan T-score -1 5 left femoral neck   On vitamin D supplement    Lab Results       Component                Value               Date                       [id number]             1 590               03/13/2021               Genitourinary: Negative for difficulty urinating  Musculoskeletal: Negative for arthralgias and myalgias  OA shoulders she is using prn Tylenol Arthritis and Tramadol   07/2019 x-rays right shoulder mild degenerative arthritis right AC joint  Left shoulder mild degenerative changes left AC joint  08/2019 bilateral shoulder intra-articular steroid injections via fluoroscopy with symptomatic relief  10/2018 s/p right TKR  Skin: Negative for rash  Neurological: Positive for tremors  Negative for dizziness and headaches  RLS on Requip  04/2021 neurology evaluation for tremor suspected benign essential  tremor  She is currently on a beta-blocker  Family history + essential tremor brother  Hematological: Negative for adenopathy  Does not bruise/bleed easily  Lab Results       Component                Value               Date                       WBC                      6 67                04/01/2021                 HGB                      12 6                04/01/2021                 HCT                      39 8                04/01/2021                 MCV                      98                  04/01/2021                 PLT                      266                 04/01/2021              Lab Results       Component                Value               Date                       [id number]               399                 04/01/2021              MMA elevated at 489   Psychiatric/Behavioral: Positive for dysphoric mood  Negative for sleep disturbance  The patient is nervous/anxious  Stress at home- 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 Dx multi-system atrophy with progressive neurologic impairment On low dose Lexapro 5 mg daily-interested in increasing dose  Health Maintenance:      There are no preventive care reminders to display for this patient  There are no preventive care reminders to display for this patient  Medicare Health Risk Assessment:     LMP 02/01/1990 ([facility] [place])      Rebecca Overton is here for her Subsequent Wellness visit  Last Medicare Wellness visit information reviewed, patient interviewed and updates made to the record today  Health Risk Assessment:   Patient rates overall health as good  Patient feels that their physical health rating is slightly worse  Patient is satisfied with their life  Eyesight was rated as same  Hearing was rated as same  Patient feels that their emotional and mental health rating is same  Patients states they are never, rarely angry  Patient states they are sometimes unusually tired/fatigued  Pain experienced in the last 7 days has been none  Patient states that she has experienced no weight loss or gain in last 6 months  Depression Screening:   PHQ-2 Score: 0      Fall Risk Screening: In the past year, patient has experienced: no history of falling in past year      Urinary Incontinence Screening:   Patient has not leaked urine accidently in the last six months  Home Safety:  Patient does not have trouble with stairs inside or outside of their home  Patient has working smoke alarms and has working carbon monoxide detector  Home safety hazards include: none  Nutrition:   Current diet is Regular  Medications:   Patient is currently taking over-the-counter supplements  OTC medications include: see medication list  Patient is able to manage medications  Activities of Daily Living (ADLs)/Instrumental Activities of Daily Living (IADLs):   Walk and transfer into and out of bed and chair?: Yes  Dress and groom yourself?: Yes    Bathe or shower yourself?: Yes    Feed yourself?  Yes  Do your laundry/housekeeping?: Yes  Manage your money, pay your bills and track your expenses?: Yes  Make your own meals?: Yes    Do your own shopping?: Yes    Previous Hospitalizations:   Any hospitalizations or ED visits within the last 12 months?: Yes    How many hospitalizations have you had in the last year?: 3-4    Advance Care Planning:   Living will: Yes    Advanced directive: Yes      Cognitive Screening:   Provider or family/friend/caregiver concerned regarding cognition?: No    PREVENTIVE SCREENINGS      Cardiovascular Screening:    General: Screening Not Indicated and History Lipid Disorder      Diabetes Screening:     General: Screening Current      Colorectal Cancer Screening:     General: Screening Current      Breast Cancer Screening:     General: Risks and Benefits Discussed    Due for: Mammogram        Cervical Cancer Screening:    General: Screening Not Indicated      Osteoporosis Screening:    General: Screening Current      Abdominal Aortic Aneurysm (AAA) Screening:        General: Screening Not Indicated      Lung Cancer Screening:     General: Screening Not Indicated      Hepatitis C Screening:    General: Screening Not Indicated    Screening, Brief Intervention, and Referral to Treatment (SBIRT)    Screening  Typical number of drinks in a day: 0  Typical number of drinks in a week: 0  Interpretation: Low risk drinking behavior  Single Item Drug Screening:  How often have you used an illegal drug (including marijuana) or a prescription medication for non-medical reasons in the past year? never    Single Item Drug Screen Score: 0  Interpretation: Negative screen for possible drug use disorder    Brief Intervention  Alcohol & drug use screenings were reviewed  No concerns regarding substance use disorder identified  Review of Current Opioid Use    Opioid Risk Tool (ORT) Interpretation: Complete Opioid Risk Tool (ORT)    Other Counseling Topics:   Calcium and vitamin D intake and regular weightbearing exercise         Didier Flanagan MD

## 2021-05-12 ENCOUNTER — TELEPHONE (OUTPATIENT)
Dept: FAMILY MEDICINE CLINIC | Facility: CLINIC | Age: 76
End: 2021-05-12

## 2021-05-12 NOTE — TELEPHONE ENCOUNTER
Patient's daughter answered question regarding form and will inform patient that question was answered

## 2021-05-19 DIAGNOSIS — I48.0 PAROXYSMAL ATRIAL FIBRILLATION (HCC): ICD-10-CM

## 2021-05-27 DIAGNOSIS — I48.0 PAROXYSMAL ATRIAL FIBRILLATION (HCC): ICD-10-CM

## 2021-05-27 RX ORDER — METOPROLOL SUCCINATE 50 MG/1
50 TABLET, EXTENDED RELEASE ORAL DAILY
Qty: 90 TABLET | Refills: 3 | Status: SHIPPED | OUTPATIENT
Start: 2021-05-27 | End: 2022-03-01 | Stop reason: SDUPTHER

## 2021-06-07 ENCOUNTER — TELEPHONE (OUTPATIENT)
Dept: CARDIOLOGY CLINIC | Facility: CLINIC | Age: 76
End: 2021-06-07

## 2021-06-07 ENCOUNTER — TELEPHONE (OUTPATIENT)
Dept: FAMILY MEDICINE CLINIC | Facility: CLINIC | Age: 76
End: 2021-06-07

## 2021-06-07 NOTE — TELEPHONE ENCOUNTER
P/c'd states her bp has been elevated for the [past 10 days  187/97  177/93,  Hr 60-70's  She has been staying with her  in the nursing home for the last month, so eating their food which is higher in NA  She will be going home to day  Would you like to change anything or have her monitor at home for a week? Has F/u with you on 8/5/21        Taking Losartan 50 mg qd              Toprol-xl 50 qd    Please advise

## 2021-06-07 NOTE — TELEPHONE ENCOUNTER
Patient called asking if you have any other recommendations  She said she seen you in the past for this issue  Patient keeps getting burning sensations when sitting down or laying down  She said it feels like she has a heating pad wrapped around her  She did see Neurologist   She also had an MRI for this  She would like to know what her next steps would be  Please advise

## 2021-06-09 NOTE — TELEPHONE ENCOUNTER
Patient called back and I gave message  She stated she is already on Gabapentin  She will call Neurology for an apt

## 2021-06-09 NOTE — TELEPHONE ENCOUNTER
Call I reviewed her chart and previous MRIs  options-Neurology re evaluation   Could consider trial of medication for burning sensation such as Gabapentin       Current Outpatient Medications:     acetaminophen (TYLENOL) 650 mg CR tablet, Take 1 tablet (650 mg total) by mouth every 6 (six) hours as needed for mild pain or moderate pain, Disp: 30 tablet, Rfl: 0    amiodarone 200 mg tablet, Take 1 tablet (200 mg total) by mouth daily, Disp: 30 tablet, Rfl: 5    apixaban (ELIQUIS) 5 mg, Take 1 tablet (5 mg total) by mouth 2 (two) times a day, Disp: 180 tablet, Rfl: 0    ascorbic acid (VITAMIN C) 500 mg tablet, Take 500 mg by mouth daily , Disp: , Rfl:     Cholecalciferol ([employer]S VITAMIN D) 2000 UNITS CAPS, Take 2,000 Units by mouth 2 (two) times a day  , Disp: , Rfl:     Chromium Picolinate (CHROMIUM PICOLATE PO), Take 1 tablet by mouth daily, Disp: , Rfl:     escitalopram (LEXAPRO) 10 mg tablet, Take 1 tablet (10 mg total) by mouth daily, Disp: 30 tablet, Rfl: 2    ezetimibe (ZETIA) 10 mg tablet, TAKE 1 TABLET BY MOUTH  DAILY, Disp: 90 tablet, Rfl: 3    famotidine (PEPCID) 20 mg tablet, Take 20 mg by mouth daily  , Disp: , Rfl:     gabapentin (NEURONTIN) 300 mg capsule, TAKE 2 CAPSULES BY MOUTH AT BEDTIME, Disp: 180 capsule, Rfl: 4    losartan (COZAAR) 50 mg tablet, Take 1 tablet (50 mg total) by mouth daily (Patient taking differently: Take 50 mg by mouth see administration instructions Take 50 mg am and 25 mg pm), Disp: 90 tablet, Rfl: 1    metoprolol succinate (TOPROL-XL) 50 mg 24 hr tablet, Take 1 tablet (50 mg total) by mouth daily, Disp: 90 tablet, Rfl: 3    rOPINIRole (REQUIP) 0 25 mg tablet, TAKE 1 TABLET BY MOUTH  DAILY AT BEDTIME, Disp: 90 tablet, Rfl: 3    traMADol (ULTRAM) 50 mg tablet, TAKE 1 TABLET BY MOUTH  TWICE DAILY AS NEEDED FOR  PAIN, Disp: 180 tablet, Rfl: 0    TURMERIC PO, Take 1 capsule by mouth 2 (two) times a day, Disp: , Rfl:     zolpidem (AMBIEN) 10 mg tablet, TAKE 1 TABLET BY MOUTH  DAILY AT BEDTIME AS NEEDED  FOR SLEEP, Disp: 90 tablet, Rfl: 0

## 2021-06-22 DIAGNOSIS — M17.11 PRIMARY OSTEOARTHRITIS OF RIGHT KNEE: ICD-10-CM

## 2021-06-22 DIAGNOSIS — I48.0 PAROXYSMAL ATRIAL FIBRILLATION (HCC): ICD-10-CM

## 2021-06-22 DIAGNOSIS — E78.00 HYPERCHOLESTEREMIA: ICD-10-CM

## 2021-06-23 RX ORDER — EZETIMIBE 10 MG/1
TABLET ORAL
Qty: 90 TABLET | Refills: 3 | Status: SHIPPED | OUTPATIENT
Start: 2021-06-23 | End: 2022-06-09 | Stop reason: SDUPTHER

## 2021-06-23 RX ORDER — TRAMADOL HYDROCHLORIDE 50 MG/1
TABLET ORAL
Qty: 60 TABLET | Refills: 2 | Status: SHIPPED | OUTPATIENT
Start: 2021-06-23 | End: 2021-08-24

## 2021-06-28 ENCOUNTER — IN-CLINIC DEVICE VISIT (OUTPATIENT)
Dept: CARDIOLOGY CLINIC | Facility: CLINIC | Age: 76
End: 2021-06-28
Payer: MEDICARE

## 2021-06-28 ENCOUNTER — OFFICE VISIT (OUTPATIENT)
Dept: NEUROLOGY | Facility: CLINIC | Age: 76
End: 2021-06-28
Payer: MEDICARE

## 2021-06-28 VITALS
HEART RATE: 60 BPM | BODY MASS INDEX: 28.16 KG/M2 | HEIGHT: 62 IN | DIASTOLIC BLOOD PRESSURE: 62 MMHG | SYSTOLIC BLOOD PRESSURE: 137 MMHG | WEIGHT: 153 LBS

## 2021-06-28 DIAGNOSIS — Z86.69 HX OF DIPLOPIA: ICD-10-CM

## 2021-06-28 DIAGNOSIS — Z95.0 PRESENCE OF PERMANENT CARDIAC PACEMAKER: Primary | ICD-10-CM

## 2021-06-28 DIAGNOSIS — G62.9 PERIPHERAL POLYNEUROPATHY: ICD-10-CM

## 2021-06-28 DIAGNOSIS — R25.1 TREMORS OF NERVOUS SYSTEM: Primary | ICD-10-CM

## 2021-06-28 DIAGNOSIS — D47.2 MGUS (MONOCLONAL GAMMOPATHY OF UNKNOWN SIGNIFICANCE): ICD-10-CM

## 2021-06-28 DIAGNOSIS — M51.34 THORACIC DEGENERATIVE DISC DISEASE: ICD-10-CM

## 2021-06-28 PROCEDURE — 93280 PM DEVICE PROGR EVAL DUAL: CPT | Performed by: INTERNAL MEDICINE

## 2021-06-28 PROCEDURE — 99214 OFFICE O/P EST MOD 30 MIN: CPT | Performed by: NURSE PRACTITIONER

## 2021-06-28 NOTE — ASSESSMENT & PLAN NOTE
Labs negative for myasthenia gravis, she does not have any other symptoms consistent with this diagnosis   Has improved with prisms, continue follow up with eye

## 2021-06-28 NOTE — PATIENT INSTRUCTIONS
Change gabapentin to 300 mg 2x/day  Consider OTC topical lidocaine formulations  If no improvement over the next few weeks talk to Dr Shama Ervin about possible cymbalta  Repeat labs in October

## 2021-06-28 NOTE — PROGRESS NOTES
Results for orders placed or performed in visit on 06/28/21   Cardiac EP device report    Narrative    MDT-DUAL CHAMBER PPM (AAIR-DDDR MODE)/ ACTIVE SYSTEM IS MRI CONDITIONAL  DEVICE INTERROGATED IN THE [place] OFFICE  BATTERY VOLTAGE ADEQUATE  (15 6 YRS) AP 61%  <1%  ALL LEAD PARAMETERS WITHIN NORMAL LIMITS  21 rATP TREATED EPISODES DETECTED  7 AT/AF EPISODES DETECTED (6 9% OF TIME) LONGEST 7MIN  1 VHR EPISODE NOTED  PATIENT IS ON AMIO, METOPROLOL AND ELIQUIS  DECREASE MADE TO AMPLITUDE TO PROMOTE DEVICE LONGEVITY WHILE MAINTAINING AN APPROPRIATE SAFETY MARGIN  RATE RESPONSE TURNED ON  NORMAL DEVICE FUNCTION  ----GRACE

## 2021-06-28 NOTE — ASSESSMENT & PLAN NOTE
Overall condition is stable, likely essential tremor  Labs were normal  She does have some difficulty with writing at times but she reports tremor has not other affect on her day to day life  She does not wish to start any medications for now  Would consider primidone if worsens  She is already on beta-blocker for her afib

## 2021-06-28 NOTE — ASSESSMENT & PLAN NOTE
Her exam continues to be stable with  mild acral sensory loss, has had paresthesias in her feet, which are managed well with the current doses of gabapentin, with an underlying diagnosis of restless leg syndrome  Strength and muscle bulk were normal throughout  Labs reviewed with patient-she has already started b12 supplement and plan to repeat labs in 6 months for MGUS which we discussed can contributing to neuropathy, along with the fact that this could be normal age related changes  In addition, she reports heat like sensation around her abdomen in the thoracic region, only bothers her when she sits in certain postures that has been relatively stable for the past year and a half  Exam remains unremarkable and stable  She did not have any spinal tenderness along the thoracic spine but was tender around her 12th ribs  She does have mild spondylosis, no significant cord compression or nerve impingement on MRI  We discussed correcting posture as she does have some postural kyphosis noted on exam, could consider PT  We discussed splitting her gabapentin to  300 mg BID to assist with day time symptoms  Also recommend trial of topical aspercream with lidocaine  If not effective we also discussed other options for pain management such as cymbalta in which she would need to discuss with her PCP since she is already on lexapro

## 2021-06-28 NOTE — PROGRESS NOTES
Patient ID: Yobany Beltran is a 67 y o  female  Assessment/Plan:    Tremors of nervous system  Overall condition is stable, likely essential tremor  Labs were normal  She does have some difficulty with writing at times but she reports tremor has not other affect on her day to day life  She does not wish to start any medications for now  Would consider primidone if worsens  She is already on beta-blocker for her afib  Peripheral neuropathy  Her exam continues to be stable with  mild acral sensory loss, has had paresthesias in her feet, which are managed well with the current doses of gabapentin, with an underlying diagnosis of restless leg syndrome  Strength and muscle bulk were normal throughout  Labs reviewed with patient-she has already started b12 supplement and plan to repeat labs in 6 months for MGUS which we discussed can contributing to neuropathy, along with the fact that this could be normal age related changes  In addition, she reports heat like sensation around her abdomen in the thoracic region, only bothers her when she sits in certain postures that has been relatively stable for the past year and a half  Exam remains unremarkable and stable  She did not have any spinal tenderness along the thoracic spine but was tender around her 12th ribs  She does have mild spondylosis, no significant cord compression or nerve impingement on MRI  We discussed correcting posture as she does have some postural kyphosis noted on exam, could consider PT  We discussed splitting her gabapentin to  300 mg BID to assist with day time symptoms  Also recommend trial of topical aspercream with lidocaine  If not effective we also discussed other options for pain management such as cymbalta in which she would need to discuss with her PCP since she is already on lexapro  Hx of diplopia  Labs negative for myasthenia gravis, she does not have any other symptoms consistent with this diagnosis   Has improved with prisms, continue follow up with eye dr Espinoza:    HPI    Patient is a 48-year-old woman who was referred for evaluation for thoracic pain, as well as for tremors  To review patient  was initially seen by Dr Beba Daniel in April 2021 for initial consult-reported she has had a warm sensation around her abdomen, which started in 2020  Sensation was described as a heat like a band that started from thoracic region, would radiate to both sides of the abdomen along the rib cage, and came all the way in the front  Symptoms are bilateral and intermittent  Worsened in upright position, and would improve within a minute or 2 if she changed her position or posture  She had been using icy Hot which tends to help  She denied any further pain/paresthesias, bowel, bladder dysfuction, weakness, ambulatory dysfunction  Did not some intermittent lower back pain  She also complained tremors in both hands  Does have family history of essential tremors in brother who is s/p DBS placement  Noted 1 year history of diplopia without ptosis in which she had been evaluated by ophthalmology and was given prisms  She does have a history of fibromyalgia and low back pain in which she is following with pain management and is on gabapentin  Work up Completed:   Blood work done in the hospital included normal TSH   CBC, CMP were normal    Copper, Ceruloplasmin, acetylcholine receptor antibodies, B6-normal  B12 399, MMA elevated at 44  Serum immunofixation: monoclonal gammopathy identified as IgG lambda (0 26 g/dL)-MGUS     MRI of the thoracic spine performed in September 2020: mild spondylosis and osteoarthritis, without any evidence for cord compression, or any neuroforaminal compression  Interval History:   Based on lab results, was recommended for her to start vitamin b 12 oral supplement, repeat labs in 6 months due to MGUS       Continues to have "hot feeling" wrapped around her abdomen and back, only when she sits, is constant when she sits, only other time she notices is when she is laying down at night before she goes to sleep  Pain is worse in the evening but this is when she tends to sit more  Has been using an ice pack helps to relieve discomfort  Rates 7/10 worse, but on average 5/10  No back pain  She does tylenol for this which does not seem to help  Has been present for only a year and a half and has not worsened  Tremors are stable, her double vision has improved with the use of prism in her lens  She does have paresthesias in her feet that is about the same as last visit  She uses topicals for this  Hx of fibromyalgia in which is controled with her gabapentin, although she has felt sleepy during the day and unsure if related to this medication  Objective:    Blood pressure 137/62, pulse 60, height 5' 2" (1 575 m), weight 69 4 kg (153 lb), last menstrual period 02/01/1990  Physical Exam  Constitutional:       General: She is awake  HENT:      Right Ear: Hearing normal       Left Ear: Hearing normal    Eyes:      General: Lids are normal       Extraocular Movements: Extraocular movements intact  Neurological:      Mental Status: She is alert  Deep Tendon Reflexes:      Reflex Scores:       Bicep reflexes are 2+ on the right side  Brachioradialis reflexes are 2+ on the right side and 2+ on the left side  Patellar reflexes are 2+ on the right side and 2+ on the left side  Achilles reflexes are 1+ on the right side and 1+ on the left side  Psychiatric:         Speech: Speech normal          Neurological Exam  Mental Status  Awake and alert  Speech is normal  Language is fluent with no aphasia  Cranial Nerves  CN III, IV, VI: Extraocular movements intact bilaterally  Normal lids and orbits bilaterally  No ptosis with sustained upward gaze  CN V:  Right: Facial sensation is normal   Left: Facial sensation is normal on the left    CN VII:  Right: There is no facial weakness  Left: There is no facial weakness  CN VIII:  Right: Hearing is normal   Left: Hearing is normal   CN IX, X: Palate elevates symmetrically  CN XI:  Right: Trapezius strength is normal   Left: Trapezius strength is normal   CN XII: Tongue midline without atrophy or fasciculations  Motor  Normal muscle bulk throughout  Right                     Left  Neck flexion                          5                           Neck extension                     5                           Elbow flexion                         5                          5  Elbow extension                    5                          5  Wrist flexion                          5                           Wrist extension                      5                          5  Finger abduction                    5                          5  Hip flexion                              5                          5  Knee flexion                           5                          5  Knee extension                      5                          5  Plantarflexion                         5                          5  Dorsiflexion                            5                          5    Sensory  Light touch is normal in upper and lower extremities  Pinprick abnormality: Normal in bilateral UE, diminished to midfoot in b/l LE    Temperature abnormality: Normal in bilateral UE, diminished to the ankles in b/l LE  Vibration abnormality: Normal in  UE, reduced at b/l great toe           Reflexes                                           Right                      Left  Brachioradialis                    2+                         2+  Biceps                                2+                          Patellar                                2+                         2+  Achilles                                1+                         1+  Plantar                           Downgoing Downgoing    Right pathological reflexes: Haily's absent  Left pathological reflexes: Haily's absent  Coordination  Finger to nose normal except mild tremor noted when approaching nose bilaterally  Postural tremor only noted when hands are supinated bilaterally       Gait  Casual gait is normal including stance, stride, and arm swing  ROS:    Review of Systems   Constitutional: Negative  Negative for appetite change and fever  HENT: Negative  Negative for hearing loss, tinnitus, trouble swallowing and voice change  Eyes: Negative  Negative for photophobia and pain  Respiratory: Negative  Negative for shortness of breath  Cardiovascular: Negative  Negative for palpitations  Gastrointestinal: Negative  Negative for nausea and vomiting  Endocrine: Negative  Negative for cold intolerance  Genitourinary: Negative  Negative for dysuria, frequency and urgency  Musculoskeletal: Negative  Negative for myalgias and neck pain  Skin: Negative  Negative for rash  Neurological: Negative  Negative for dizziness, tremors, seizures, syncope, facial asymmetry, speech difficulty, weakness, light-headedness, numbness and headaches  Hematological: Negative  Does not bruise/bleed easily  Psychiatric/Behavioral: Negative  Negative for confusion, hallucinations and sleep disturbance  Patient gets heated around mid section  When patient is moving its not there when she sits down it gets progressively hotter and at night when she's trying to fall asleep it becomes bothersome

## 2021-06-30 ENCOUNTER — OFFICE VISIT (OUTPATIENT)
Dept: FAMILY MEDICINE CLINIC | Facility: CLINIC | Age: 76
End: 2021-06-30
Payer: MEDICARE

## 2021-06-30 VITALS
TEMPERATURE: 99.2 F | HEIGHT: 62 IN | HEART RATE: 64 BPM | WEIGHT: 155 LBS | DIASTOLIC BLOOD PRESSURE: 74 MMHG | SYSTOLIC BLOOD PRESSURE: 122 MMHG | RESPIRATION RATE: 16 BRPM | BODY MASS INDEX: 28.52 KG/M2

## 2021-06-30 DIAGNOSIS — L98.9 NON-HEALING SKIN LESION: Primary | ICD-10-CM

## 2021-06-30 PROCEDURE — 11301 SHAVE SKIN LESION 0.6-1.0 CM: CPT | Performed by: FAMILY MEDICINE

## 2021-06-30 PROCEDURE — 88305 TISSUE EXAM BY PATHOLOGIST: CPT | Performed by: PATHOLOGY

## 2021-06-30 NOTE — PROGRESS NOTES
Shave lesion    Date/Time: 6/30/2021 1:10 PM  Performed by: Sagrario Stack MD  Authorized by: Sagrario Stack MD   Universal Protocol:  Consent: Verbal consent obtained  Risks and benefits: risks, benefits and alternatives were discussed  Consent given by: patient      Number of Lesions: 1  Lesion 1:     Body area: upper extremity    Upper extremity location: L lower arm (Left forearm)    Initial size (mm): 8    Final defect size (mm): 8    Malignancy: malignancy unknown      Destruction method: shave removal      Comments:   8 mm x 6 mm non healing lesion left forearm-  See photo  Using aseptic technique area cleansed with Betadine Base of lesion infiltrated with 1 mL lidocaine 1%  Using a derma blade lesion was removed by shave excision-Aluminum chloride applied to base for hemostasis  Specimen sent for biopsy    Wound care instructions reviewed

## 2021-07-02 ENCOUNTER — TELEPHONE (OUTPATIENT)
Dept: FAMILY MEDICINE CLINIC | Facility: CLINIC | Age: 76
End: 2021-07-02

## 2021-07-02 NOTE — TELEPHONE ENCOUNTER
Call re biopsy benign keratosis no additional treatment needed     Recent Results (from the past 168 hour(s))   Tissue Exam    Collection Time: 06/30/21  1:26 PM   Result Value Ref Range    Case Report       Surgical Pathology Report                         Case: [id number]                                   Authorizing Provider:  Torin Robles MD         Collected:           06/30/2021 1326              Ordering Location:     [address] [place] [address]   Received:            06/30/2021 1326              Pathologist:           Mandie Chavez MD                                                             Specimen:    Arm, Left, forearm                                                                         Final Diagnosis       A  Skin lesion, Arm, Left, forearm, shave biopsy:  - Benign verrucous and lichenoid keratosis  - Negative for malignancy  Additional Information       All reported additional testing was performed with appropriately reactive controls  These tests were developed and their performance characteristics determined by [address] [place] [address] [facility] or appropriate performing facility, though some tests may be performed on tissues which have not been validated for performance characteristics (such as staining performed on alcohol exposed cell blocks and decalcified tissues)  Results should be interpreted with caution and in the context of the patients clinical condition  These tests may not be cleared or approved by the [place]  Food and Drug Administration, though the FDA has determined that such clearance or approval is not necessary  These tests are used for clinical purposes and they should not be regarded as investigational or for research  This laboratory has been approved by CLIA 88, designated as a high-complexity laboratory and is qualified to perform these tests  Interpretation performed at [facility], [address] [place] [id number]  Gross Description          A   The specimen is received in formalin, labeled with the patient's name and hospital number, and is designated "left forearm  The specimen consists of a tan, rubbery superficial portion of skin measuring 0 7 x 0 6 by less than 0 1 cm  The epithelial surface exhibits a brown-gray, keratotic, crusted papule measuring 0 5 x 0 5 x 0 1 cm  The margin of resection is inked green  The specimen is bisected and is entirely submitted between sponges in 1 cassette  Note: The estimated total formalin fixation time based upon information provided by the submitting clinician and the standard processing schedule is under 72 hours    Parish          Clinical Information non healing skin lesion

## 2021-07-15 ENCOUNTER — OFFICE VISIT (OUTPATIENT)
Dept: CARDIOLOGY CLINIC | Facility: CLINIC | Age: 76
End: 2021-07-15
Payer: MEDICARE

## 2021-07-15 VITALS
HEIGHT: 62 IN | DIASTOLIC BLOOD PRESSURE: 84 MMHG | HEART RATE: 64 BPM | BODY MASS INDEX: 28.52 KG/M2 | WEIGHT: 155 LBS | SYSTOLIC BLOOD PRESSURE: 134 MMHG

## 2021-07-15 DIAGNOSIS — D47.2 MGUS (MONOCLONAL GAMMOPATHY OF UNKNOWN SIGNIFICANCE): ICD-10-CM

## 2021-07-15 DIAGNOSIS — I48.0 PAROXYSMAL ATRIAL FIBRILLATION (HCC): ICD-10-CM

## 2021-07-15 DIAGNOSIS — I10 ESSENTIAL HYPERTENSION: ICD-10-CM

## 2021-07-15 DIAGNOSIS — I48.0 PAROXYSMAL ATRIAL FIBRILLATION (HCC): Primary | ICD-10-CM

## 2021-07-15 DIAGNOSIS — Z95.0 S/P PLACEMENT OF CARDIAC PACEMAKER: ICD-10-CM

## 2021-07-15 DIAGNOSIS — C73 MALIGNANT NEOPLASM OF THYROID GLAND (HCC): ICD-10-CM

## 2021-07-15 DIAGNOSIS — Z79.01 CURRENT USE OF LONG TERM ANTICOAGULATION: ICD-10-CM

## 2021-07-15 DIAGNOSIS — E78.2 MIXED HYPERLIPIDEMIA: ICD-10-CM

## 2021-07-15 DIAGNOSIS — I50.32 CHRONIC DIASTOLIC CHF (CONGESTIVE HEART FAILURE) (HCC): ICD-10-CM

## 2021-07-15 PROCEDURE — 99214 OFFICE O/P EST MOD 30 MIN: CPT | Performed by: INTERNAL MEDICINE

## 2021-07-15 PROCEDURE — 93000 ELECTROCARDIOGRAM COMPLETE: CPT | Performed by: INTERNAL MEDICINE

## 2021-07-15 RX ORDER — AMIODARONE HYDROCHLORIDE 100 MG/1
100 TABLET ORAL DAILY
Qty: 90 TABLET | Refills: 3 | Status: SHIPPED | OUTPATIENT
Start: 2021-07-15 | End: 2022-01-10 | Stop reason: CLARIF

## 2021-07-15 NOTE — LETTER
July 20, 2021     [place], [address] [address]    Patient: Tereza Saxena   YOB: 1945   Date of Visit: 7/15/2021       Dear Dr Jacqueline Morales:    Thank you for referring Whit Roman to me for evaluation  Below are my notes for this consultation  If you have questions, please do not hesitate to call me  I look forward to following your patient along with you  Sincerely,        Conchis Herrera MD        CC: Otilio Lea  [address] [facility]  Conchis Herrera MD  7/20/2021  7:08 PM  Sign when Signing Visit                                             Cardiology Follow Up    Tereza Saxena  1945  [id number]  [address]  [facility]  [phone]    1  Paroxysmal atrial fibrillation (HCC)  POCT ECG   2  Malignant neoplasm of thyroid gland ([address] )     3  Essential hypertension     4  Chronic diastolic CHF (congestive heart failure) ([facility] [address] )     5  Mixed hyperlipidemia     6  Current use of long term anticoagulation     7  S/P placement of cardiac pacemaker     8   MGUS (monoclonal gammopathy of unknown significance)         Interval History:   The patient underwent comprehensive ablation for paroxysmal atrial fibrillation  During the procedure she went into ventricular fibrillation-she was bradycardic and on sotalol  Post ablation procedures she also underwent pacemaker placement    Patient was doing well for sometime  However she has recurrence of atrial fibrillation with RVR  She is symptomatic with the palpitation    Prior to ablation she was having recurrent episodes of AFib with RVR resulting in diastolic heart failure      She is not complaining of anginal like chest pain  She is not complaining of worsening orthopnea or PND  She is not complaining of worsening leg swelling    She does feel the palpitation and it is uncomfortable     Patient is a never smoker   She does not abuse alcohol  She does not use recreational drugs   She does not use energy drinks     She does not have a history of snoring   She does not have history of morning fatigue or daytime sleepiness     There is a family history of diabetes, coronary artery disease, stroke      prior medical history  The patient is a pleasant 78-year-old lady, with a history of A fib    This has been present for at least 6 years, but there has been a recent increase in frequency and duration  Was started on sotalol 80 bid about 3 years ago     Predominant symptom is palpitation and occasional shortness of breath and chest pain  There has been no episode of presyncope or syncope  She does not complain of orthopnea or paroxysmal nocturnal dyspnea  Patient does not have any leg swelling     she does have a history of snoring, morning fatigability and daytime sleepiness  However sleep study was negative for significant AKIN     The patient does have a history of knee replacement and has significant arthritis of her other knee     She takes care of her  and cannot stay too long in hospital      /84 (BP Location: Left arm, Patient Position: Sitting, Cuff Size: Standard)   Pulse 64   Ht 5' 2" (1 575 m)   Wt 70 3 kg (155 lb)   LMP 02/01/1990 (Within Weeks)   BMI 28 35 kg/m²       Patient Active Problem List   Diagnosis    Essential hypertension    Allergic rhinitis    Fibromyalgia    Hyperlipidemia    Insomnia    Lumbar spondylosis    Lumbar stenosis    Osteoarthrosis, hand    Osteoarthritis of knee    Osteopenia    Paroxysmal atrial fibrillation (HCC)    Peripheral neuropathy    Restless legs syndrome    TMJ syndrome    Vitamin D deficiency    Osteoarthritis of shoulder    Carpal tunnel syndrome on right    Arthritis of both acromioclavicular joints    Chronic rhinitis    Chronic diastolic CHF (congestive heart failure) ([address] )    Malignant neoplasm of thyroid gland ([address] )    Current use of long term anticoagulation    S/P placement of cardiac pacemaker    Tremors of nervous system    Hx of diplopia    Hurthle cell adenoma of thyroid    MGUS (monoclonal gammopathy of unknown significance)     Past Medical History:   Diagnosis Date    Actinic keratosis     last assessed - [id number]    Arthritis     Atrial fibrillation with rapid ventricular response (HCC)     last assessed - 26Apr2016    Basal cell carcinoma     Benign colon polyp     last assessed - 27Apr2015    Disease of thyroid gland     Effusion of knee joint right     Resolved - [id number]    Esophageal reflux     Fibromyalgia     last assessed - [id number]    Fibromyalgia, primary     GERD (gastroesophageal reflux disease)     Hypertension     Palpitations     last assessed - [id number]    Peroneal tendonitis, right     last assessed - [id number]    Right lumbar radiculopathy 3/17/2016    Thyroid nodule     Trochanteric bursitis of left hip 3/9/2018     Social History     Socioeconomic History    Marital status: /Civil Union     Spouse name: Not on file    Number of children: Not on file    Years of education: Not on file    Highest education level: Not on file   Occupational History    Not on file   Tobacco Use    Smoking status: Never Smoker    Smokeless tobacco: Never Used   Vaping Use    Vaping Use: Never used   Substance and Sexual Activity    Alcohol use: Not Currently     Comment: occosional - every 3 months    Drug use: Never    Sexual activity: Not Currently   Other Topics Concern    Not on file   Social History Narrative    Not on file     Social Determinants of Health     Financial Resource Strain:     Difficulty of Paying Living Expenses:    Food Insecurity:     Worried About Running Out of Food in the Last Year:     Ran Out of Food in the Last Year:    Transportation Needs:     Lack of Transportation (Medical):      Lack of Transportation (Non-Medical):    Physical Activity:     Days of Exercise per Week:     Minutes of Exercise per Session:    Stress:     Feeling of Stress :    Social Connections:     Frequency of Communication with Friends and Family:     Frequency of Social Gatherings with Friends and Family:     Attends [employer] Services:     Active Member of Clubs or Organizations:     Attends Club or Organization Meetings:     Marital Status:    Intimate Partner Violence:     Fear of Current or Ex-Partner:     Emotionally Abused:     Physically Abused:     Sexually Abused:       Family History   Problem Relation Age of Onset    Heart disease Mother     Diabetes Mother     Heart disease Father     Coronary artery disease Father     Stroke Father         cerebrovascular accident    Heart attack Father         myocardial infarction    Sudden death Father         scd    Other Family         Back disorder    Coronary artery disease Family     Neuropathy Family     Osteoporosis Family     No Known Problems Daughter     No Known Problems Maternal Grandmother     No Known Problems Maternal Grandfather     No Known Problems Paternal Grandmother     No Known Problems Paternal Grandfather     Cancer Maternal Uncle     Breast cancer Maternal Aunt 72    No Known Problems Son     No Known Problems Maternal Aunt     No Known Problems Maternal Aunt     No Known Problems Maternal Aunt     No Known Problems Paternal Aunt     No Known Problems Paternal Aunt     Anuerysm Neg Hx     Clotting disorder Neg Hx     Arrhythmia Neg Hx     Heart failure Neg Hx      Past Surgical History:   Procedure Laterality Date    CATARACT EXTRACTION Bilateral     COLONOSCOPY  03/2018    EYE SURGERY      HYSTERECTOMY      JOINT REPLACEMENT Left     knee    OH REVISE MEDIAN N/CARPAL TUNNEL SURG Right 11/14/2019    Procedure: RELEASE CARPAL TUNNEL;  Surgeon: Smith Harvey MD;  [facility];  Service: Orthopedics    OH THYROID LOBECTOMY,UNILAT Left 12/16/2020    Procedure: Left THYROID LOBECTOMY;  Surgeon: Adria Jorgensen MD;  [facility];  Service: ENT    RECTAL POLYPECTOMY      REPLACEMENT TOTAL KNEE Left     last assessed - 27Apr2015    TONSILLECTOMY      TOTAL ABDOMINAL HYSTERECTOMY      TUBAL LIGATION      US GUIDED THYROID BIOPSY  10/14/2020       Current Outpatient Medications:     acetaminophen (TYLENOL) 650 mg CR tablet, Take 1 tablet (650 mg total) by mouth every 6 (six) hours as needed for mild pain or moderate pain, Disp: 30 tablet, Rfl: 0    apixaban (ELIQUIS) 5 mg, Take 1 tablet (5 mg total) by mouth 2 (two) times a day Lot [id number], exp Aug 2022, Disp: 56 tablet, Rfl: 0    ascorbic acid (VITAMIN C) 500 mg tablet, Take 500 mg by mouth daily , Disp: , Rfl:     Cholecalciferol ([employer] VITAMIN D) 2000 UNITS CAPS, Take 2,000 Units by mouth 2 (two) times a day  , Disp: , Rfl:     Chromium Picolinate (CHROMIUM PICOLATE PO), Take 1 tablet by mouth daily, Disp: , Rfl:     escitalopram (LEXAPRO) 10 mg tablet, Take 1 tablet (10 mg total) by mouth daily (Patient taking differently: Take 10 mg by mouth daily 10 mg tablet in morning), Disp: 30 tablet, Rfl: 2    ezetimibe (ZETIA) 10 mg tablet, TAKE 1 TABLET BY MOUTH  DAILY, Disp: 90 tablet, Rfl: 3    famotidine (PEPCID) 20 mg tablet, Take 20 mg by mouth daily  , Disp: , Rfl:     gabapentin (NEURONTIN) 300 mg capsule, TAKE 2 CAPSULES BY MOUTH AT BEDTIME, Disp: 180 capsule, Rfl: 4    losartan (COZAAR) 50 mg tablet, Take 1 tablet (50 mg total) by mouth daily (Patient taking differently: Take 50 mg by mouth see administration instructions Take 50 mg am and 25 mg pm), Disp: 90 tablet, Rfl: 1    metoprolol succinate (TOPROL-XL) 50 mg 24 hr tablet, Take 1 tablet (50 mg total) by mouth daily, Disp: 90 tablet, Rfl: 3    rOPINIRole (REQUIP) 0 25 mg tablet, TAKE 1 TABLET BY MOUTH  DAILY AT BEDTIME, Disp: 90 tablet, Rfl: 3    traMADol (ULTRAM) 50 mg tablet, TAKE 1 TABLET BY MOUTH  TWICE DAILY AS NEEDED FOR  PAIN, Disp: 60 tablet, Rfl: 2   TURMERIC PO, Take 1 capsule by mouth 2 (two) times a day, Disp: , Rfl:     zolpidem (AMBIEN) 10 mg tablet, TAKE 1 TABLET BY MOUTH  DAILY AT BEDTIME AS NEEDED  FOR SLEEP, Disp: 90 tablet, Rfl: 0    amiodarone 100 mg tablet, Take 1 tablet (100 mg total) by mouth daily Decreased Amiodarone to 100 mg once a day, Disp: 90 tablet, Rfl: 3  Allergies   Allergen Reactions    Sotalol Other (See Comments)     Prolonged QTc leading to torsades de pointes     Penicillins Other (See Comments)     As a child calcium deposit in the arm     Ace Inhibitors GI Intolerance     Did feel well on it       Labs:  Lab Results   Component Value Date     05/06/2015    K 4 4 04/01/2021    K 4 8 05/06/2015     04/01/2021    CL 99 (L) 05/06/2015    CO2 31 04/01/2021    CO2 28 05/06/2015    BUN 27 (H) 04/01/2021    BUN 19 05/06/2015    CREATININE 0 96 04/01/2021    CREATININE 0 97 05/06/2015    GLUCOSE 114 05/06/2015    CALCIUM 9 4 04/01/2021    CALCIUM 9 0 05/06/2015     Lab Results   Component Value Date    CKTOTAL 100 07/10/2014    TROPONINI <0 02 01/08/2021     Lab Results   Component Value Date    WBC 6 67 04/01/2021    WBC 6 63 05/06/2015    HGB 12 6 04/01/2021    HGB 13 3 05/06/2015    HCT 39 8 04/01/2021    HCT 39 3 05/06/2015    MCV 98 04/01/2021    MCV 93 05/06/2015     04/01/2021     05/06/2015     Lab Results   Component Value Date    CHOL 205 05/06/2015    TRIG 112 04/01/2021    TRIG 90 05/06/2015    HDL 66 04/01/2021    HDL 53 05/06/2015     Imaging: No results found  ambulatory event monitor   October 2020                echocardiogram   January 2021  LEFT VENTRICLE:  Systolic function was normal  Ejection fraction was estimated to be 55 %  There were no regional wall motion abnormalities    Wall thickness was at the upper limits of normal   Doppler parameters were consistent with elevated ventricular end-diastolic filling pressure      LEFT ATRIUM:  The atrium was mildly to moderately dilated      RIGHT ATRIUM:  The atrium was mildly dilated      MITRAL VALVE:  There was mild stenosis      TRICUSPID VALVE:  There was mild to moderate regurgitation  Pulmonary artery systolic pressure was mildly increased             Review of Systems:  Review of Systems   All other systems reviewed and are negative  as described in my history of present illness        Physical Exam:  Physical Exam  Vitals reviewed  Constitutional:       Appearance: Normal appearance  She is well-developed  She is not ill-appearing  Comments: Not in any distress at the current time   HENT:      Head: Normocephalic and atraumatic  Right Ear: External ear normal       Left Ear: External ear normal       Nose: Nose normal       Mouth/Throat:      Pharynx: Uvula midline  Eyes:      General: Lids are normal  No scleral icterus  Extraocular Movements: Extraocular movements intact  Conjunctiva/sclera: Conjunctivae normal       Pupils: Pupils are equal, round, and reactive to light  Comments: No pallor  No cyanosis  No icterus   Neck:      Thyroid: No thyromegaly  Vascular: No carotid bruit or JVD  Trachea: Trachea normal       Comments: No jugular lymphadenopathy  Cardiovascular:      Rate and Rhythm: Tachycardia present  Rhythm irregular  Chest Wall: PMI is not displaced  Pulses: Normal pulses  Heart sounds: Normal heart sounds, S1 normal and S2 normal  No murmur heard  No friction rub  No gallop  No S3 or S4 sounds  Pulmonary:      Effort: Pulmonary effort is normal  No accessory muscle usage or respiratory distress  Breath sounds: Normal breath sounds  No decreased breath sounds, wheezing, rhonchi or rales  Chest:      Chest wall: No tenderness  Abdominal:      General: Abdomen is flat  Bowel sounds are normal  There is no distension  Palpations: Abdomen is soft  There is no hepatomegaly, splenomegaly or mass  Tenderness: There is no abdominal tenderness  Musculoskeletal:         General: No swelling, tenderness or deformity  Normal range of motion  Cervical back: Normal range of motion and neck supple  No rigidity  No muscular tenderness  Lymphadenopathy:      Cervical: No cervical adenopathy  Skin:     Coloration: Skin is not jaundiced or pale  Findings: No abrasion, bruising, erythema, lesion or rash  Nails: There is no clubbing  Neurological:      Mental Status: She is alert and oriented to person, place, and time  Mental status is at baseline  Cranial Nerves: No cranial nerve deficit  Comments: Facial symmetry is retained  Extraocular movements are retained  Head neck tongue and palate movement are retained and symmetric   Psychiatric:         Mood and Affect: Mood normal          Speech: Speech normal          Behavior: Behavior normal          Thought Content: Thought content normal          Discussion/Summary:  1   Paroxysmal atrial fibrillation  Patient was having recurrent episodes of atrial fibrillation, RVR, diastolic heart failure   She underwent PVI for paroxysmal atrial fibrillation   She did developed torsades on the operating table when her heart rate was low and she was on sotalol  Postprocedure she was left on flecainide   She did well for some time   She is back in atrial fibrillation with RVR and asymptomatic       We had a very detailed discussion as far as management of atrial fibrillation   my detailed recommendation for this patient are as follows         1 - natural history of disease   atrial fibrillation is a disease of age   prevalence is 1% in the 4th decade , 2-3% by age 72 and 12-13% by age 80   since it increases with age , definitive therapy is indicated         2 - sleep apnea    No significant sleep apnea in 2016        3 - thyroid function   hyperthyroidism is a common precipitator of atrial fibrillation   TSH - 1 759 in Sept 2018  Recently underwent therapy for thyroid cancer         4 -Aggressive management of  hypertension - on amlodepin  heart failure - LVEF =60%        5 - anticoagulation   patient's chads Vasc score is -3  hypertension   age   gender      Is on apixaban     6 - rate control medication   beta-blocker effect of sotalol           7 - rhythm control medication   Sotalol caused torsades  Flecainide cause AFib to go into flutter with a much more rapid rate     Patient is being started on amiodarone  200 mg 3 times a day loading for 2 weeks, then 200 mg daily  Check TSH, CMP, PFT with DLCO and eye evaluation        8 - ablation   Patient has already undergone PVI        Summary of my recommendation for the patient   Plan to use amiodarone for 6 months   Thereafter will discontinue amiodarone   If patient has recurrence of atrial flutter fib will proceed with repeat ablation   This time will add flutter line on the right and posterior wall isolation of the left           2  Hypertension  Currently reasonably controlled   Losartan, metoprolol  134/84           3  Thyroid cancer   Recently underwent surgery   Check TSH   Need to follow thyroid status closely, hyperthyroidism can precipitate atrial fibrillation        4  Hyperlipidemia   On zetia         5   Long term anticoagulation  CHADSVASc -3  On apixaban         Summary of my recommendation for the patient  Check thyroid status closely, hyperthyroidism can precipitate atrial fibrillation    Plan to use amiodarone for 6 months   Thereafter will discontinue amiodarone   If patient has recurrence of atrial flutter fib will proceed with repeat ablation   This time will add flutter line on the right and posterior wall isolation of the left

## 2021-07-15 NOTE — PROGRESS NOTES
Cardiology Follow Up    Roberto Fowler  1945  [id number]  [facility] [id number] [facility]  [phone]    1  Paroxysmal atrial fibrillation (HCC)  POCT ECG   2  Malignant neoplasm of thyroid gland ([facility] [address] )     3  Essential hypertension     4  Chronic diastolic CHF (congestive heart failure) ([facility] )     5  Mixed hyperlipidemia     6  Current use of long term anticoagulation     7  S/P placement of cardiac pacemaker     8  MGUS (monoclonal gammopathy of unknown significance)         Interval History:   The patient underwent comprehensive ablation for paroxysmal atrial fibrillation  During the procedure she went into ventricular fibrillation-she was bradycardic and on sotalol  Post ablation procedures she also underwent pacemaker placement    Patient was doing well for sometime  However she has recurrence of atrial fibrillation with RVR  She is symptomatic with the palpitation    Prior to ablation she was having recurrent episodes of AFib with RVR resulting in diastolic heart failure      She is not complaining of anginal like chest pain  She is not complaining of worsening orthopnea or PND  She is not complaining of worsening leg swelling    She does feel the palpitation and it is uncomfortable     Patient is a never smoker   She does not abuse alcohol  She does not use recreational drugs   She does not use energy drinks     She does not have a history of snoring   She does not have history of morning fatigue or daytime sleepiness     There is a family history of diabetes, coronary artery disease, stroke      prior medical history  The patient is a pleasant 60-year-old lady, with a history of A fib    This has been present for at least 6 years, but there has been a recent increase in frequency and duration  Was started on sotalol 80 bid about 3 years ago     Predominant symptom is palpitation and occasional shortness of breath and chest pain  There has been no episode of presyncope or syncope  She does not complain of orthopnea or paroxysmal nocturnal dyspnea  Patient does not have any leg swelling     she does have a history of snoring, morning fatigability and daytime sleepiness  However sleep study was negative for significant AKIN     The patient does have a history of knee replacement and has significant arthritis of her other knee     She takes care of her  and cannot stay too long in hospital      /84 (BP Location: Left arm, Patient Position: Sitting, Cuff Size: Standard)   Pulse 64   Ht 5' 2" (1 575 m)   Wt 70 3 kg (155 lb)   LMP 02/01/1990 (Within Weeks)   BMI 28 35 kg/m²       Patient Active Problem List   Diagnosis    Essential hypertension    Allergic rhinitis    Fibromyalgia    Hyperlipidemia    Insomnia    Lumbar spondylosis    Lumbar stenosis    Osteoarthrosis, hand    Osteoarthritis of knee    Osteopenia    Paroxysmal atrial fibrillation (HCC)    Peripheral neuropathy    Restless legs syndrome    TMJ syndrome    Vitamin D deficiency    Osteoarthritis of shoulder    Carpal tunnel syndrome on right    Arthritis of both acromioclavicular joints    Chronic rhinitis    Chronic diastolic CHF (congestive heart failure) (HCC)    Malignant neoplasm of thyroid gland (HCC)    Current use of long term anticoagulation    S/P placement of cardiac pacemaker    Tremors of nervous system    Hx of diplopia    Hurthle cell adenoma of thyroid    MGUS (monoclonal gammopathy of unknown significance)     Past Medical History:   Diagnosis Date    Actinic keratosis     last assessed - [id number]    Arthritis     Atrial fibrillation with rapid ventricular response ([facility] [address] )     last assessed - 26Apr2016    Basal cell carcinoma     Benign colon polyp     last assessed - 27Apr2015    Disease of thyroid gland     Effusion of knee joint right     Resolved - [id number]    Esophageal reflux     Fibromyalgia     last assessed - [id number]    Fibromyalgia, primary     GERD (gastroesophageal reflux disease)     Hypertension     Palpitations     last assessed - [id number]    Peroneal tendonitis, right     last assessed - [id number]    Right lumbar radiculopathy 3/17/2016    Thyroid nodule     Trochanteric bursitis of left hip 3/9/2018     Social History     Socioeconomic History    Marital status: /Civil Union     Spouse name: Not on file    Number of children: Not on file    Years of education: Not on file    Highest education level: Not on file   Occupational History    Not on file   Tobacco Use    Smoking status: Never Smoker    Smokeless tobacco: Never Used   Vaping Use    Vaping Use: Never used   Substance and Sexual Activity    Alcohol use: Not Currently     Comment: occosional - every 3 months    Drug use: Never    Sexual activity: Not Currently   Other Topics Concern    Not on file   Social History Narrative    Not on file     Social Determinants of Health     Financial Resource Strain:     Difficulty of Paying Living Expenses:    Food Insecurity:     Worried About Running Out of Food in the Last Year:     Ran Out of Food in the Last Year:    Transportation Needs:     Lack of Transportation (Medical):      Lack of Transportation (Non-Medical):    Physical Activity:     Days of Exercise per Week:     Minutes of Exercise per Session:    Stress:     Feeling of Stress :    Social Connections:     Frequency of Communication with Friends and Family:     Frequency of Social Gatherings with Friends and Family:     Attends Yazidism Services:     Active Member of Clubs or Organizations:     Attends Club or Organization Meetings:     Marital Status:    Intimate Partner Violence:     Fear of Current or Ex-Partner:     Emotionally Abused:     Physically Abused:     Sexually Abused:       Family History   Problem Relation Age of Onset    Heart disease Mother     Diabetes Mother     Heart disease Father     Coronary artery disease Father     Stroke Father         cerebrovascular accident    Heart attack Father         myocardial infarction    Sudden death Father         scd    Other Family         Back disorder    Coronary artery disease Family     Neuropathy Family     Osteoporosis Family     No Known Problems Daughter     No Known Problems Maternal Grandmother     No Known Problems Maternal Grandfather     No Known Problems Paternal Grandmother     No Known Problems Paternal Grandfather     Cancer Maternal Uncle     Breast cancer Maternal Aunt 72    No Known Problems Son     No Known Problems Maternal Aunt     No Known Problems Maternal Aunt     No Known Problems Maternal Aunt     No Known Problems Paternal Aunt     No Known Problems Paternal Aunt     Anuerysm Neg Hx     Clotting disorder Neg Hx     Arrhythmia Neg Hx     Heart failure Neg Hx      Past Surgical History:   Procedure Laterality Date    CATARACT EXTRACTION Bilateral     COLONOSCOPY  03/2018    EYE SURGERY      HYSTERECTOMY      JOINT REPLACEMENT Left     knee    NH REVISE MEDIAN N/CARPAL TUNNEL SURG Right 11/14/2019    Procedure: RELEASE CARPAL TUNNEL;  Surgeon: Deloris Vega MD;  [facility];  Service: Orthopedics    NH THYROID LOBECTOMY,UNILAT Left 12/16/2020    Procedure: Left THYROID LOBECTOMY;  Surgeon: John Delgadillo MD;  [facility];  Service: ENT    RECTAL POLYPECTOMY      REPLACEMENT TOTAL KNEE Left     last assessed - [id number]    TONSILLECTOMY      TOTAL ABDOMINAL HYSTERECTOMY      TUBAL LIGATION      US GUIDED THYROID BIOPSY  10/14/2020       Current Outpatient Medications:     acetaminophen (TYLENOL) 650 mg CR tablet, Take 1 tablet (650 mg total) by mouth every 6 (six) hours as needed for mild pain or moderate pain, Disp: 30 tablet, Rfl: 0    apixaban (ELIQUIS) 5 mg, Take 1 tablet (5 mg total) by mouth 2 (two) times a day Lot [id number], exp Aug 2022, Disp: 56 tablet, Rfl: 0    ascorbic acid (VITAMIN C) 500 mg tablet, Take 500 mg by mouth daily , Disp: , Rfl:     Cholecalciferol (CVS VITAMIN D) 2000 UNITS CAPS, Take 2,000 Units by mouth 2 (two) times a day  , Disp: , Rfl:     Chromium Picolinate (CHROMIUM PICOLATE PO), Take 1 tablet by mouth daily, Disp: , Rfl:     escitalopram (LEXAPRO) 10 mg tablet, Take 1 tablet (10 mg total) by mouth daily (Patient taking differently: Take 10 mg by mouth daily 10 mg tablet in morning), Disp: 30 tablet, Rfl: 2    ezetimibe (ZETIA) 10 mg tablet, TAKE 1 TABLET BY MOUTH  DAILY, Disp: 90 tablet, Rfl: 3    famotidine (PEPCID) 20 mg tablet, Take 20 mg by mouth daily  , Disp: , Rfl:     gabapentin (NEURONTIN) 300 mg capsule, TAKE 2 CAPSULES BY MOUTH AT BEDTIME, Disp: 180 capsule, Rfl: 4    losartan (COZAAR) 50 mg tablet, Take 1 tablet (50 mg total) by mouth daily (Patient taking differently: Take 50 mg by mouth see administration instructions Take 50 mg am and 25 mg pm), Disp: 90 tablet, Rfl: 1    metoprolol succinate (TOPROL-XL) 50 mg 24 hr tablet, Take 1 tablet (50 mg total) by mouth daily, Disp: 90 tablet, Rfl: 3    rOPINIRole (REQUIP) 0 25 mg tablet, TAKE 1 TABLET BY MOUTH  DAILY AT BEDTIME, Disp: 90 tablet, Rfl: 3    traMADol (ULTRAM) 50 mg tablet, TAKE 1 TABLET BY MOUTH  TWICE DAILY AS NEEDED FOR  PAIN, Disp: 60 tablet, Rfl: 2    TURMERIC PO, Take 1 capsule by mouth 2 (two) times a day, Disp: , Rfl:     zolpidem (AMBIEN) 10 mg tablet, TAKE 1 TABLET BY MOUTH  DAILY AT BEDTIME AS NEEDED  FOR SLEEP, Disp: 90 tablet, Rfl: 0    amiodarone 100 mg tablet, Take 1 tablet (100 mg total) by mouth daily Decreased Amiodarone to 100 mg once a day, Disp: 90 tablet, Rfl: 3  Allergies   Allergen Reactions    Sotalol Other (See Comments)     Prolonged QTc leading to torsades de pointes     Penicillins Other (See Comments)     As a child calcium deposit in the arm     Ace Inhibitors GI Intolerance Did feel well on it       Labs:  Lab Results   Component Value Date     05/06/2015    K 4 4 04/01/2021    K 4 8 05/06/2015     04/01/2021    CL 99 (L) 05/06/2015    CO2 31 04/01/2021    CO2 28 05/06/2015    BUN 27 (H) 04/01/2021    BUN 19 05/06/2015    CREATININE 0 96 04/01/2021    CREATININE 0 97 05/06/2015    GLUCOSE 114 05/06/2015    CALCIUM 9 4 04/01/2021    CALCIUM 9 0 05/06/2015     Lab Results   Component Value Date    CKTOTAL 100 07/10/2014    TROPONINI <0 02 01/08/2021     Lab Results   Component Value Date    WBC 6 67 04/01/2021    WBC 6 63 05/06/2015    HGB 12 6 04/01/2021    HGB 13 3 05/06/2015    HCT 39 8 04/01/2021    HCT 39 3 05/06/2015    MCV 98 04/01/2021    MCV 93 05/06/2015     04/01/2021     05/06/2015     Lab Results   Component Value Date    CHOL 205 05/06/2015    TRIG 112 04/01/2021    TRIG 90 05/06/2015    HDL 66 04/01/2021    HDL 53 05/06/2015     Imaging: No results found  ambulatory event monitor   October 2020                echocardiogram   January 2021  LEFT VENTRICLE:  Systolic function was normal  Ejection fraction was estimated to be 55 %  There were no regional wall motion abnormalities  Wall thickness was at the upper limits of normal   Doppler parameters were consistent with elevated ventricular end-diastolic filling pressure      LEFT ATRIUM:  The atrium was mildly to moderately dilated      RIGHT ATRIUM:  The atrium was mildly dilated      MITRAL VALVE:  There was mild stenosis      TRICUSPID VALVE:  There was mild to moderate regurgitation  Pulmonary artery systolic pressure was mildly increased             Review of Systems:  Review of Systems   All other systems reviewed and are negative  as described in my history of present illness        Physical Exam:  Physical Exam  Vitals reviewed  Constitutional:       Appearance: Normal appearance  She is well-developed  She is not ill-appearing        Comments: Not in any distress at the current time   HENT:      Head: Normocephalic and atraumatic  Right Ear: External ear normal       Left Ear: External ear normal       Nose: Nose normal       Mouth/Throat:      Pharynx: Uvula midline  Eyes:      General: Lids are normal  No scleral icterus  Extraocular Movements: Extraocular movements intact  Conjunctiva/sclera: Conjunctivae normal       Pupils: Pupils are equal, round, and reactive to light  Comments: No pallor  No cyanosis  No icterus   Neck:      Thyroid: No thyromegaly  Vascular: No carotid bruit or JVD  Trachea: Trachea normal       Comments: No jugular lymphadenopathy  Cardiovascular:      Rate and Rhythm: Tachycardia present  Rhythm irregular  Chest Wall: PMI is not displaced  Pulses: Normal pulses  Heart sounds: Normal heart sounds, S1 normal and S2 normal  No murmur heard  No friction rub  No gallop  No S3 or S4 sounds  Pulmonary:      Effort: Pulmonary effort is normal  No accessory muscle usage or respiratory distress  Breath sounds: Normal breath sounds  No decreased breath sounds, wheezing, rhonchi or rales  Chest:      Chest wall: No tenderness  Abdominal:      General: Abdomen is flat  Bowel sounds are normal  There is no distension  Palpations: Abdomen is soft  There is no hepatomegaly, splenomegaly or mass  Tenderness: There is no abdominal tenderness  Musculoskeletal:         General: No swelling, tenderness or deformity  Normal range of motion  Cervical back: Normal range of motion and neck supple  No rigidity  No muscular tenderness  Lymphadenopathy:      Cervical: No cervical adenopathy  Skin:     Coloration: Skin is not jaundiced or pale  Findings: No abrasion, bruising, erythema, lesion or rash  Nails: There is no clubbing  Neurological:      Mental Status: She is alert and oriented to person, place, and time  Mental status is at baseline  Cranial Nerves: No cranial nerve deficit  Comments: Facial symmetry is retained  Extraocular movements are retained  Head neck tongue and palate movement are retained and symmetric   Psychiatric:         Mood and Affect: Mood normal          Speech: Speech normal          Behavior: Behavior normal          Thought Content: Thought content normal          Discussion/Summary:  1   Paroxysmal atrial fibrillation  Patient was having recurrent episodes of atrial fibrillation, RVR, diastolic heart failure   She underwent PVI for paroxysmal atrial fibrillation   She did developed torsades on the operating table when her heart rate was low and she was on sotalol  Postprocedure she was left on flecainide   She did well for some time   She is back in atrial fibrillation with RVR and asymptomatic       We had a very detailed discussion as far as management of atrial fibrillation   my detailed recommendation for this patient are as follows         1 - natural history of disease   atrial fibrillation is a disease of age   prevalence is 1% in the 4th decade , 2-3% by age 72 and 12-13% by age 80   since it increases with age , definitive therapy is indicated         2 - sleep apnea    No significant sleep apnea in 2016        3 - thyroid function   hyperthyroidism is a common precipitator of atrial fibrillation   TSH - 1 759 in Sept 2018  Recently underwent therapy for thyroid cancer         4 -Aggressive management of  hypertension - on amlodepin  heart failure - LVEF =60%        5 - anticoagulation   patient's chads Vasc score is -3  hypertension   age   gender      Is on apixaban     6 - rate control medication   beta-blocker effect of sotalol           7 - rhythm control medication   Sotalol caused torsades  Flecainide cause AFib to go into flutter with a much more rapid rate     Patient is being started on amiodarone  200 mg 3 times a day loading for 2 weeks, then 200 mg daily  Check TSH, CMP, PFT with DLCO and eye evaluation        8 - ablation   Patient has already undergone PVI        Summary of my recommendation for the patient   Plan to use amiodarone for 6 months   Thereafter will discontinue amiodarone   If patient has recurrence of atrial flutter fib will proceed with repeat ablation   This time will add flutter line on the right and posterior wall isolation of the left           2  Hypertension  Currently reasonably controlled   Losartan, metoprolol  134/84           3  Thyroid cancer   Recently underwent surgery   Check TSH   Need to follow thyroid status closely, hyperthyroidism can precipitate atrial fibrillation        4  Hyperlipidemia   On zetia         5   Long term anticoagulation  CHADSVASc -3  On apixaban         Summary of my recommendation for the patient  Check thyroid status closely, hyperthyroidism can precipitate atrial fibrillation    Plan to use amiodarone for 6 months   Thereafter will discontinue amiodarone   If patient has recurrence of atrial flutter fib will proceed with repeat ablation   This time will add flutter line on the right and posterior wall isolation of the left

## 2021-07-20 PROBLEM — Z63.79 STRESS DUE TO ILLNESS OF FAMILY MEMBER: Status: RESOLVED | Noted: 2021-01-08 | Resolved: 2021-07-20

## 2021-07-26 DIAGNOSIS — I48.0 PAROXYSMAL ATRIAL FIBRILLATION (HCC): ICD-10-CM

## 2021-07-26 NOTE — TELEPHONE ENCOUNTER
Returned call and left message on machine letting patient know we are low on eliquis samples at the moment and to give a call back to let us know where we can send a script

## 2021-07-28 ENCOUNTER — TELEPHONE (OUTPATIENT)
Dept: CARDIOLOGY CLINIC | Facility: CLINIC | Age: 76
End: 2021-07-28

## 2021-07-28 NOTE — TELEPHONE ENCOUNTER
Pt called to report that she caught her toe on a rug on Saturday and fell flat on her chest   She says she did not hit her head, just the ribs and breast are bruised  She did not   Pt feels OK, and is due to have a remote transmission tomorrow due to meds  I will forward to Dr Mckinley Thomas and the [facility] for review

## 2021-07-29 ENCOUNTER — REMOTE DEVICE CLINIC VISIT (OUTPATIENT)
Dept: CARDIOLOGY CLINIC | Facility: CLINIC | Age: 76
End: 2021-07-29

## 2021-07-29 DIAGNOSIS — Z95.0 PRESENCE OF PERMANENT CARDIAC PACEMAKER: Primary | ICD-10-CM

## 2021-07-29 PROCEDURE — RECHECK

## 2021-07-29 NOTE — PROGRESS NOTES
Results for orders placed or performed in visit on 07/29/21   Cardiac EP device report    Narrative    MDT-DUAL CHAMBER PPM (AAIR-DDDR MODE)/ ACTIVE SYSTEM IS MRI CONDITIONAL  NON-BILLABLE CARELINK TRANSMISSION - 2 WK EPISODE CHECK PER SN: BATTERY VOLTAGE ADEQUATE (13 6 YRS)  AP 83 6%  <0 1% (AAIR - DDDR 60)  ALL AVAILABLE LEAD PARAMETERS WITHIN NORMAL LIMITS  NO SIGNIFICANT HIGH RATE EPISODES  PACEMAKER FUNCTIONING APPROPRIATELY    EBS

## 2021-07-30 ENCOUNTER — TELEPHONE (OUTPATIENT)
Dept: FAMILY MEDICINE CLINIC | Facility: CLINIC | Age: 76
End: 2021-07-30

## 2021-07-30 ENCOUNTER — TELEPHONE (OUTPATIENT)
Dept: CARDIOLOGY CLINIC | Facility: CLINIC | Age: 76
End: 2021-07-30

## 2021-07-30 NOTE — TELEPHONE ENCOUNTER
If patient having ongoing symptoms she should be seen, if she feels like it is urgent she will need to go to urgent care or else wait until next week

## 2021-07-30 NOTE — TELEPHONE ENCOUNTER
Called Ayla Burnham about samples held at   She said she just came last week & someone gave her 2 weeks worth but she will be over for these!

## 2021-07-30 NOTE — TELEPHONE ENCOUNTER
Patient fell at home & landed on her chest/ breastbone last 7/17/21  She is all bruised & sore  She did not get evaluated  Can she get an order for xray to check under her breastbone

## 2021-08-02 DIAGNOSIS — Z63.79 STRESS DUE TO ILLNESS OF FAMILY MEMBER: ICD-10-CM

## 2021-08-02 RX ORDER — ESCITALOPRAM OXALATE 10 MG/1
TABLET ORAL
Qty: 30 TABLET | Refills: 2 | Status: SHIPPED | OUTPATIENT
Start: 2021-08-02 | End: 2021-10-14 | Stop reason: SDUPTHER

## 2021-08-05 ENCOUNTER — OFFICE VISIT (OUTPATIENT)
Dept: CARDIOLOGY CLINIC | Facility: CLINIC | Age: 76
End: 2021-08-05
Payer: MEDICARE

## 2021-08-05 ENCOUNTER — APPOINTMENT (OUTPATIENT)
Dept: RADIOLOGY | Facility: MEDICAL CENTER | Age: 76
End: 2021-08-05
Payer: MEDICARE

## 2021-08-05 VITALS
SYSTOLIC BLOOD PRESSURE: 154 MMHG | DIASTOLIC BLOOD PRESSURE: 80 MMHG | HEIGHT: 62 IN | OXYGEN SATURATION: 98 % | BODY MASS INDEX: 28.87 KG/M2 | WEIGHT: 156.9 LBS | HEART RATE: 61 BPM

## 2021-08-05 DIAGNOSIS — I10 ESSENTIAL HYPERTENSION, BENIGN: ICD-10-CM

## 2021-08-05 DIAGNOSIS — I50.32 CHRONIC DIASTOLIC CHF (CONGESTIVE HEART FAILURE) (HCC): ICD-10-CM

## 2021-08-05 DIAGNOSIS — I50.32 CHRONIC DIASTOLIC HEART FAILURE (HCC): ICD-10-CM

## 2021-08-05 DIAGNOSIS — I10 ESSENTIAL HYPERTENSION: ICD-10-CM

## 2021-08-05 DIAGNOSIS — E78.2 MIXED HYPERLIPIDEMIA: ICD-10-CM

## 2021-08-05 DIAGNOSIS — I48.0 PAROXYSMAL ATRIAL FIBRILLATION (HCC): Primary | ICD-10-CM

## 2021-08-05 DIAGNOSIS — R78.2 FINDING OF COCAINE IN BLOOD: ICD-10-CM

## 2021-08-05 DIAGNOSIS — I48.0 PAROXYSMAL ATRIAL FIBRILLATION (HCC): ICD-10-CM

## 2021-08-05 DIAGNOSIS — Z95.0 S/P PLACEMENT OF CARDIAC PACEMAKER: ICD-10-CM

## 2021-08-05 PROCEDURE — 99214 OFFICE O/P EST MOD 30 MIN: CPT | Performed by: INTERNAL MEDICINE

## 2021-08-05 PROCEDURE — 93000 ELECTROCARDIOGRAM COMPLETE: CPT | Performed by: INTERNAL MEDICINE

## 2021-08-05 PROCEDURE — 71046 X-RAY EXAM CHEST 2 VIEWS: CPT

## 2021-08-05 NOTE — PROGRESS NOTES
Cardiology Follow Up    Demi Dave  1945  [id number]  [address]    1  Paroxysmal atrial fibrillation (HCC)  POCT ECG    XR chest pa & lateral   2  Essential hypertension  XR chest pa & lateral   3  Chronic diastolic CHF (congestive heart failure) (HCC)  XR chest pa & lateral   4  Mixed hyperlipidemia  XR chest pa & lateral   5  S/P placement of cardiac pacemaker  XR chest pa & lateral       Discussion/Summary:   Overall she has been doing great from a cardiac standpoint she is maintaining sinus rhythm  She continues to follow with electrophysiology they talked about taking her off amiodarone in 6 months  Blood pressures have been well controlled she was little bit high today but was under some stress earlier this morning  Will continue to follow  Pacemaker has some slight tenderness on the upper margin appears to be healing well will check a chest x-ray PA and lateral   No signs decompensated heart failure follow-up in 6 months  Interval History:  Very 74 yo pleasant female with  paroxysmal atrial fibrillation, hypertension, hyperlipidemia presents for a follow-up visit  She continues to remain in sinus rhythm on sotalol  Overall she has been doing well since pacemaker implantation  Functionally she has been doing great denies any chest pain, shortness of breath, palpitations, lightheadedness, dizziness, or syncope  There has been lower extremity edema, PND, orthopnea  Upper margin of the pacemaker has healed well there was some slight tenderness on the high margin    Problem List     Generalized edema    Essential hypertension    Dyslipidemia    Sacroiliitis (HCC)    Acute pain of right knee    Allergic rhinitis    Fibromyalgia    Hyperlipidemia    Insomnia    Lumbar spondylosis    Lumbar stenosis    Osteoarthrosis, hand    Osteoarthritis of knee    Overview Signed 10/4/2018  5:41 PM by Precious Hernandes Sayra Calderon MD     Laterality: Right         Osteopenia    Paroxysmal atrial fibrillation (HCC)    Peripheral neuropathy    Restless legs syndrome    Right lumbar radiculopathy    TMJ syndrome    Vitamin D deficiency    Effusion of right knee        Past Medical History:   Diagnosis Date    Actinic keratosis     last assessed - 06Jun2014    Arthritis     Atrial fibrillation with rapid ventricular response (HCC)     last assessed - 26Apr2016    Basal cell carcinoma     Benign colon polyp     last assessed - 27Apr2015    Disease of thyroid gland     Effusion of knee joint right     Resolved - 19Apr2016    Esophageal reflux     Fibromyalgia     last assessed - [id number]    Fibromyalgia, primary     GERD (gastroesophageal reflux disease)     Hypertension     Palpitations     last assessed - [id number]    Peroneal tendonitis, right     last assessed - [id number]    Right lumbar radiculopathy 3/17/2016    Thyroid nodule     Trochanteric bursitis of left hip 3/9/2018     Social History     Socioeconomic History    Marital status: /Civil Union     Spouse name: Not on file    Number of children: Not on file    Years of education: Not on file    Highest education level: Not on file   Occupational History    Not on file   Tobacco Use    Smoking status: Never Smoker    Smokeless tobacco: Never Used   Vaping Use    Vaping Use: Never used   Substance and Sexual Activity    Alcohol use: Not Currently     Comment: occosional - every 3 months    Drug use: Never    Sexual activity: Not Currently   Other Topics Concern    Not on file   Social History Narrative    Not on file     Social Determinants of Health     Financial Resource Strain:     Difficulty of Paying Living Expenses:    Food Insecurity:     Worried About Running Out of Food in the Last Year:     Ran Out of Food in the Last Year:    Transportation Needs:     Lack of Transportation (Medical):      Lack of Transportation (Non-Medical): Physical Activity:     Days of Exercise per Week:     Minutes of Exercise per Session:    Stress:     Feeling of Stress :    Social Connections:     Frequency of Communication with Friends and Family:     Frequency of Social Gatherings with Friends and Family:     Attends [employer] Services:     Active Member of Clubs or Organizations:     Attends Club or Organization Meetings:     Marital Status:    Intimate Partner Violence:     Fear of Current or Ex-Partner:     Emotionally Abused:     Physically Abused:     Sexually Abused:       Family History   Problem Relation Age of Onset    Heart disease Mother     Diabetes Mother     Heart disease Father     Coronary artery disease Father     Stroke Father         cerebrovascular accident    Heart attack Father         myocardial infarction    Sudden death Father         scd    Other Family         Back disorder    Coronary artery disease Family     Neuropathy Family     Osteoporosis Family     No Known Problems Daughter     No Known Problems Maternal Grandmother     No Known Problems Maternal Grandfather     No Known Problems Paternal Grandmother     No Known Problems Paternal Grandfather     Cancer Maternal Uncle     Breast cancer Maternal Aunt 72    No Known Problems Son     No Known Problems Maternal Aunt     No Known Problems Maternal Aunt     No Known Problems Maternal Aunt     No Known Problems Paternal Aunt     No Known Problems Paternal Aunt     Anuerysm Neg Hx     Clotting disorder Neg Hx     Arrhythmia Neg Hx     Heart failure Neg Hx      Past Surgical History:   Procedure Laterality Date    CATARACT EXTRACTION Bilateral     COLONOSCOPY  03/2018    EYE SURGERY      HYSTERECTOMY      JOINT REPLACEMENT Left     knee    IA REVISE MEDIAN N/CARPAL TUNNEL SURG Right 11/14/2019    Procedure: RELEASE CARPAL TUNNEL;  Surgeon: Carlos Nichols MD;  [facility];  Service: Orthopedics    IA THYROID LOBECTOMY,UNILAT Left 12/16/2020    Procedure: Left THYROID LOBECTOMY;  Surgeon: Autumn Umanzor MD;  [facility];  Service: ENT    RECTAL POLYPECTOMY      REPLACEMENT TOTAL KNEE Left     last assessed - 27Apr2015    TONSILLECTOMY      TOTAL ABDOMINAL HYSTERECTOMY      TUBAL LIGATION      US GUIDED THYROID BIOPSY  10/14/2020       Current Outpatient Medications:     acetaminophen (TYLENOL) 650 mg CR tablet, Take 1 tablet (650 mg total) by mouth every 6 (six) hours as needed for mild pain or moderate pain, Disp: 30 tablet, Rfl: 0    amiodarone 100 mg tablet, Take 1 tablet (100 mg total) by mouth daily Decreased Amiodarone to 100 mg once a day, Disp: 90 tablet, Rfl: 3    apixaban (ELIQUIS) 5 mg, Take 1 tablet (5 mg total) by mouth 2 (two) times a day Lot [id number] Exp 3/2023, Disp: 28 tablet, Rfl: 0    ascorbic acid (VITAMIN C) 500 mg tablet, Take 500 mg by mouth daily , Disp: , Rfl:     Cholecalciferol (CVS VITAMIN D) 2000 UNITS CAPS, Take 2,000 Units by mouth 2 (two) times a day  , Disp: , Rfl:     Chromium Picolinate (CHROMIUM PICOLATE PO), Take 1 tablet by mouth daily, Disp: , Rfl:     escitalopram (LEXAPRO) 10 mg tablet, take 1 tablet by mouth once daily, Disp: 30 tablet, Rfl: 2    ezetimibe (ZETIA) 10 mg tablet, TAKE 1 TABLET BY MOUTH  DAILY, Disp: 90 tablet, Rfl: 3    famotidine (PEPCID) 20 mg tablet, Take 20 mg by mouth daily  , Disp: , Rfl:     gabapentin (NEURONTIN) 300 mg capsule, TAKE 2 CAPSULES BY MOUTH AT BEDTIME, Disp: 180 capsule, Rfl: 4    losartan (COZAAR) 50 mg tablet, Take 1 tablet (50 mg total) by mouth daily (Patient taking differently: Take 50 mg by mouth see administration instructions Take 50 mg am and 25 mg pm), Disp: 90 tablet, Rfl: 1    metoprolol succinate (TOPROL-XL) 50 mg 24 hr tablet, Take 1 tablet (50 mg total) by mouth daily, Disp: 90 tablet, Rfl: 3    rOPINIRole (REQUIP) 0 25 mg tablet, TAKE 1 TABLET BY MOUTH  DAILY AT BEDTIME, Disp: 90 tablet, Rfl: 3    traMADol (ULTRAM) 50 mg tablet, TAKE 1 TABLET BY MOUTH  TWICE DAILY AS NEEDED FOR  PAIN, Disp: 60 tablet, Rfl: 2    TURMERIC PO, Take 1 capsule by mouth 2 (two) times a day, Disp: , Rfl:     zolpidem (AMBIEN) 10 mg tablet, TAKE 1 TABLET BY MOUTH  DAILY AT BEDTIME AS NEEDED  FOR SLEEP, Disp: 90 tablet, Rfl: 0  Allergies   Allergen Reactions    Sotalol Other (See Comments)     Prolonged QTc leading to torsades de pointes     Penicillins Other (See Comments)     As a child calcium deposit in the arm     Ace Inhibitors GI Intolerance     Did feel well on it       Labs:     Chemistry        Component Value Date/Time     05/06/2015 1116    K 4 4 04/01/2021 1345    K 4 8 05/06/2015 1116     04/01/2021 1345    CL 99 (L) 05/06/2015 1116    CO2 31 04/01/2021 1345    CO2 28 05/06/2015 1116    BUN 27 (H) 04/01/2021 1345    BUN 19 05/06/2015 1116    CREATININE 0 96 04/01/2021 1345    CREATININE 0 97 05/06/2015 1116        Component Value Date/Time    CALCIUM 9 4 04/01/2021 1345    CALCIUM 9 0 05/06/2015 1116    ALKPHOS 68 04/01/2021 1345    ALKPHOS 60 05/06/2015 1116    AST 14 04/01/2021 1345    AST 28 05/06/2015 1116    ALT 25 04/01/2021 1345    ALT 34 05/06/2015 1116    BILITOT 0 78 05/06/2015 1116            Lab Results   Component Value Date    CHOL 205 05/06/2015    CHOL 195 07/10/2014     Lab Results   Component Value Date    HDL 66 04/01/2021    HDL 54 08/03/2020    HDL 57 11/25/2019     Lab Results   Component Value Date    LDLCALC 88 04/01/2021    LDLCALC 104 (H) 08/03/2020    LDLCALC 106 (H) 11/25/2019     Lab Results   Component Value Date    TRIG 112 04/01/2021    TRIG 121 08/03/2020    TRIG 106 11/25/2019     No results found for: CHOLHDL    Imaging: No results found  ECG:    Sinus bradycardia nonspecific T-wave changes     Review of Systems   Constitutional: Negative  HENT: Negative  Eyes: Negative  Cardiovascular: Negative  Negative for leg swelling  Respiratory: Negative  Endocrine: Negative  Hematologic/Lymphatic: Negative  Skin: Negative  Musculoskeletal: Negative  Gastrointestinal: Negative  Genitourinary: Negative  Neurological: Negative  Psychiatric/Behavioral: Negative  Vitals:    08/05/21 1351   BP: 154/80   Pulse: 61   SpO2: 98%     Vitals:    08/05/21 1351   Weight: 71 2 kg (156 lb 14 4 oz)     Height: 5' 2" (157 5 cm)   Body mass index is 28 7 kg/m²  Physical Exam:  Vital signs reviewed  General:  Alert and cooperative, appears stated age, no acute distress  HEENT:  PERRLA, EOMI, no scleral icterus, no conjunctival pallor  Neck:  No lymphadenopathy, no thyromegaly, no carotid bruits, no elevated JVP  Heart:  Regular rate and rhythm, normal S1/S2, no S3/S4, no murmur, rubs or gallops  PMI nondisplaced  Lungs:  Clear to auscultation bilaterally, no wheezes rales or rhonchi  Abdomen:  Soft, non-tender, positive bowel sounds, no rebound or guarding,   no organomegaly   Extremities:  Normal range of motion    No clubbing, cyanosis or edema   Vascular:  2+ pedal pulses  Skin:  No rashes or lesions on exposed skin  Neurologic:  Cranial nerves II-XII grossly intact without focal deficits  Psych:  Normal mood and affect

## 2021-08-12 ENCOUNTER — REMOTE DEVICE CLINIC VISIT (OUTPATIENT)
Dept: CARDIOLOGY CLINIC | Facility: CLINIC | Age: 76
End: 2021-08-12
Payer: MEDICARE

## 2021-08-12 ENCOUNTER — TELEPHONE (OUTPATIENT)
Dept: CARDIOLOGY CLINIC | Facility: CLINIC | Age: 76
End: 2021-08-12

## 2021-08-12 DIAGNOSIS — Z95.0 CARDIAC PACEMAKER IN SITU: Primary | ICD-10-CM

## 2021-08-12 DIAGNOSIS — I10 ESSENTIAL HYPERTENSION: ICD-10-CM

## 2021-08-12 PROCEDURE — 93296 REM INTERROG EVL PM/IDS: CPT | Performed by: INTERNAL MEDICINE

## 2021-08-12 PROCEDURE — 93294 REM INTERROG EVL PM/LDLS PM: CPT | Performed by: INTERNAL MEDICINE

## 2021-08-12 RX ORDER — LOSARTAN POTASSIUM 50 MG/1
50 TABLET ORAL 2 TIMES DAILY
Qty: 180 TABLET | Refills: 3 | Status: SHIPPED | OUTPATIENT
Start: 2021-08-12 | End: 2022-02-15 | Stop reason: SDUPTHER

## 2021-08-12 NOTE — TELEPHONE ENCOUNTER
P/c'd, her bp has been elevated and she is lightheaded  Highest this am 185/99  Hr 65, presently 143/78  Hr 72  Denies any CHF symptoms, wt stable  Is watching fluid and Na intake        Taking:Toprol-xl 50 mg              Losartan 50 mg - am  25 mg -pm      Please advise

## 2021-08-12 NOTE — PROGRESS NOTES
Results for orders placed or performed in visit on 08/12/21   Cardiac EP device report    Narrative    MDT-DUAL CHAMBER PPM (AAIR-DDDR MODE)/ ACTIVE SYSTEM IS MRI CONDITIONAL  CARELINK TRANSMISSION: BATTERY STATUS "13 YRS " AP 82%  0%  ALL AVAILABLE LEAD PARAMETERS WITHIN NORMAL LIMITS  NO SIGNIFICANT HIGH RATE EPISODES  NORMAL DEVICE FUNCTION   NC         Current Outpatient Medications:     acetaminophen (TYLENOL) 650 mg CR tablet, Take 1 tablet (650 mg total) by mouth every 6 (six) hours as needed for mild pain or moderate pain, Disp: 30 tablet, Rfl: 0    amiodarone 100 mg tablet, Take 1 tablet (100 mg total) by mouth daily Decreased Amiodarone to 100 mg once a day, Disp: 90 tablet, Rfl: 3    apixaban (ELIQUIS) 5 mg, Take 1 tablet (5 mg total) by mouth 2 (two) times a day Lot [id number] Exp 3/2023, Disp: 28 tablet, Rfl: 0    ascorbic acid (VITAMIN C) 500 mg tablet, Take 500 mg by mouth daily , Disp: , Rfl:     Cholecalciferol (CVS VITAMIN D) 2000 UNITS CAPS, Take 2,000 Units by mouth 2 (two) times a day  , Disp: , Rfl:     Chromium Picolinate (CHROMIUM PICOLATE PO), Take 1 tablet by mouth daily, Disp: , Rfl:     escitalopram (LEXAPRO) 10 mg tablet, take 1 tablet by mouth once daily, Disp: 30 tablet, Rfl: 2    ezetimibe (ZETIA) 10 mg tablet, TAKE 1 TABLET BY MOUTH  DAILY, Disp: 90 tablet, Rfl: 3    famotidine (PEPCID) 20 mg tablet, Take 20 mg by mouth daily  , Disp: , Rfl:     gabapentin (NEURONTIN) 300 mg capsule, TAKE 2 CAPSULES BY MOUTH AT BEDTIME, Disp: 180 capsule, Rfl: 4    losartan (COZAAR) 50 mg tablet, Take 1 tablet (50 mg total) by mouth 2 (two) times a day, Disp: 180 tablet, Rfl: 3    metoprolol succinate (TOPROL-XL) 50 mg 24 hr tablet, Take 1 tablet (50 mg total) by mouth daily, Disp: 90 tablet, Rfl: 3    rOPINIRole (REQUIP) 0 25 mg tablet, TAKE 1 TABLET BY MOUTH  DAILY AT BEDTIME, Disp: 90 tablet, Rfl: 3    traMADol (ULTRAM) 50 mg tablet, TAKE 1 TABLET BY MOUTH  TWICE DAILY AS NEEDED FOR PAIN, Disp: 60 tablet, Rfl: 2    TURMERIC PO, Take 1 capsule by mouth 2 (two) times a day, Disp: , Rfl:     zolpidem (AMBIEN) 10 mg tablet, TAKE 1 TABLET BY MOUTH  DAILY AT BEDTIME AS NEEDED  FOR SLEEP, Disp: 90 tablet, Rfl: 0

## 2021-08-13 ENCOUNTER — TELEPHONE (OUTPATIENT)
Dept: CARDIOLOGY CLINIC | Facility: CLINIC | Age: 76
End: 2021-08-13

## 2021-08-13 DIAGNOSIS — I48.0 PAROXYSMAL ATRIAL FIBRILLATION (HCC): Primary | ICD-10-CM

## 2021-08-13 NOTE — TELEPHONE ENCOUNTER
Spoke with pt and today she feels  Fluttering in her chest and fatigue, denies any other syptoms  She did have device report yesterday, feels more a-fib last several days  Bp this am 148/89 HR 60's   Did start increase dose of losartan last night      Taking:losartan 50 mg bid              Amiodarone 100 mg qd               Toprol-xl 50 mg qd      Please advise

## 2021-08-16 ENCOUNTER — TELEPHONE (OUTPATIENT)
Dept: CARDIOLOGY CLINIC | Facility: CLINIC | Age: 76
End: 2021-08-16

## 2021-08-16 NOTE — TELEPHONE ENCOUNTER
Advised  Verbally understood  Will com tomorrow for holter  Bp 167/82  And today 120/66 after medication

## 2021-08-17 ENCOUNTER — PROCEDURE VISIT (OUTPATIENT)
Dept: CARDIOLOGY CLINIC | Facility: CLINIC | Age: 76
End: 2021-08-17

## 2021-08-17 DIAGNOSIS — I48.0 PAROXYSMAL ATRIAL FIBRILLATION (HCC): Primary | ICD-10-CM

## 2021-08-17 DIAGNOSIS — I48.0 PAROXYSMAL ATRIAL FIBRILLATION (HCC): ICD-10-CM

## 2021-08-17 PROCEDURE — RECHECK: Performed by: INTERNAL MEDICINE

## 2021-08-20 DIAGNOSIS — G47.01 INSOMNIA DUE TO MEDICAL CONDITION: ICD-10-CM

## 2021-08-20 DIAGNOSIS — G25.81 RLS (RESTLESS LEGS SYNDROME): ICD-10-CM

## 2021-08-20 DIAGNOSIS — M17.11 PRIMARY OSTEOARTHRITIS OF RIGHT KNEE: ICD-10-CM

## 2021-08-20 NOTE — TELEPHONE ENCOUNTER
07/22/2021  1  06/23/2021  TRAMADOL HCL 50 MG TABLET  60 0  30  JA MAR  [id number]  [facility] ([id number])  1  10 0 MME  [place]         04/28/2021  1  03/26/2021  ZOLPIDEM TARTRATE 10 MG TABLET  90 0  90  JA MAR  [id number]  [facility] ([id number])  0   [place]

## 2021-08-24 ENCOUNTER — HOSPITAL ENCOUNTER (OUTPATIENT)
Dept: NON INVASIVE DIAGNOSTICS | Facility: HOSPITAL | Age: 76
Discharge: HOME | End: 2021-08-24
Payer: MEDICARE

## 2021-08-24 DIAGNOSIS — I48.0 PAROXYSMAL ATRIAL FIBRILLATION (HCC): ICD-10-CM

## 2021-08-24 PROCEDURE — 93226 XTRNL ECG REC<48 HR SCAN A/R: CPT

## 2021-08-24 PROCEDURE — 93227 XTRNL ECG REC<48 HR R&I: CPT | Performed by: INTERNAL MEDICINE

## 2021-08-24 PROCEDURE — 93225 XTRNL ECG REC<48 HRS REC: CPT

## 2021-08-24 RX ORDER — TRAMADOL HYDROCHLORIDE 50 MG/1
TABLET ORAL
Qty: 60 TABLET | Refills: 3 | Status: SHIPPED | OUTPATIENT
Start: 2021-08-24 | End: 2022-02-10 | Stop reason: SDUPTHER

## 2021-08-24 RX ORDER — ZOLPIDEM TARTRATE 10 MG/1
TABLET ORAL
Qty: 90 TABLET | Refills: 0 | Status: SHIPPED | OUTPATIENT
Start: 2021-08-24 | End: 2021-09-30

## 2021-08-24 RX ORDER — ROPINIROLE 0.25 MG/1
TABLET, FILM COATED ORAL
Qty: 90 TABLET | Refills: 3 | Status: SHIPPED | OUTPATIENT
Start: 2021-08-24 | End: 2022-05-25

## 2021-08-25 ENCOUNTER — TELEPHONE (OUTPATIENT)
Dept: NEUROLOGY | Facility: CLINIC | Age: 76
End: 2021-08-25

## 2021-08-25 NOTE — TELEPHONE ENCOUNTER
Patient's family called in Cory Solorio - not on communication consent) and left vm with questions regarding patient having bladder issues     Please call for additional information    Cb#: [phone]

## 2021-08-26 NOTE — TELEPHONE ENCOUNTER
[employer]  She states her  called on behalf of himself not Asaf Gonzalez  Will open encounter for Jorge and close this one

## 2021-08-31 ENCOUNTER — LAB REQUISITION (OUTPATIENT)
Dept: LAB | Facility: HOSPITAL | Age: 76
End: 2021-08-31
Payer: MEDICARE

## 2021-08-31 DIAGNOSIS — Z86.010 PERSONAL HISTORY OF COLONIC POLYPS: ICD-10-CM

## 2021-08-31 DIAGNOSIS — K57.30 DIVERTICULOSIS OF LARGE INTESTINE WITHOUT PERFORATION OR ABSCESS WITHOUT BLEEDING: ICD-10-CM

## 2021-08-31 DIAGNOSIS — K63.5 POLYP OF COLON: ICD-10-CM

## 2021-08-31 PROCEDURE — 88305 TISSUE EXAM BY PATHOLOGIST: CPT | Performed by: PATHOLOGY

## 2021-09-03 ENCOUNTER — TELEMEDICINE (OUTPATIENT)
Dept: FAMILY MEDICINE CLINIC | Facility: CLINIC | Age: 76
End: 2021-09-03
Payer: MEDICARE

## 2021-09-03 DIAGNOSIS — J30.1 SEASONAL ALLERGIC RHINITIS DUE TO POLLEN: Primary | ICD-10-CM

## 2021-09-03 PROCEDURE — 99441 PR PHYS/QHP TELEPHONE EVALUATION 5-10 MIN: CPT | Performed by: FAMILY MEDICINE

## 2021-09-03 NOTE — PROGRESS NOTES
Virtual Brief Visit    Verification of patient location:    Patient is located in the following state in which I hold an active license [place]      Assessment/Plan:    Problem List Items Addressed This Visit        Respiratory    Allergic rhinitis - Primary         continue with current medications  Start Flonase nasal spray 2 sprays each nostril q day  Avoid decongestants  Follow up if no improvement in 1 week call if any changes          Reason for visit is   Chief Complaint   Patient presents with   [address] Visit        Encounter provider Torin Robles MD    Provider located at [place] [address]  [phone]    Recent Visits  No visits were found meeting these conditions  Showing recent visits within past 7 days and meeting all other requirements  Today's Visits  Date Type Provider Dept   09/03/21 Telemedicine Torin Robles MD [facility]   Showing today's visits and meeting all other requirements  Future Appointments  No visits were found meeting these conditions  Showing future appointments within next 150 days and meeting all other requirements       After connecting through telephone, the patient was identified by name and date of birth  Tarah Gray was informed that this is a telemedicine visit and that the visit is being conducted through telephone  My office door was closed  No one else was in the room  She acknowledged consent and understanding of privacy and security of the platform  The patient has agreed to participate and understands she can discontinue the visit at any time  Patient is aware this is a billable service  Subjective    Tarah Gray is a 67 y o  female  It was my intent to perform this visit via video technology but the patient was not able to do a video connection so the visit was completed via audio telephone only         2 week history of persistent nasal congestion with clear rhinorrhea sneezing postnasal drip  Mild nonproductive cough no fevers  No loss of taste or smell  No shortness of breath or wheezing  History of seasonal allergies she has been using as needed Claritin and Mucinex  No COVID-19 exposure    Patient has completed the COVID-19 vaccine series Current medications reviewed       Past Medical History:   Diagnosis Date    Actinic keratosis     last assessed - 06Jun2014    Arthritis     Atrial fibrillation with rapid ventricular response (HCC)     last assessed - 26Apr2016    Basal cell carcinoma     Benign colon polyp     last assessed - 27Apr2015    Disease of thyroid gland     Effusion of knee joint right     Resolved - 19Apr2016    Esophageal reflux     Fibromyalgia     last assessed - 27Dec2017    Fibromyalgia, primary     GERD (gastroesophageal reflux disease)     Hypertension     Palpitations     last assessed - 30Apr2013    Peroneal tendonitis, right     last assessed - 02Oct2013    Right lumbar radiculopathy 3/17/2016    Thyroid cancer ([address] )     Thyroid nodule     Trochanteric bursitis of left hip 3/9/2018       Past Surgical History:   Procedure Laterality Date    CATARACT EXTRACTION Bilateral     COLONOSCOPY  03/2018    EYE SURGERY      HYSTERECTOMY      JOINT REPLACEMENT Left     knee    TN REVISE MEDIAN N/CARPAL TUNNEL SURG Right 11/14/2019    Procedure: RELEASE CARPAL TUNNEL;  Surgeon: Tj Jackson MD;  [facility];  Service: Orthopedics    TN THYROID LOBECTOMY,UNILAT Left 12/16/2020    Procedure: Left THYROID LOBECTOMY;  Surgeon: Shaila Villeda MD;  [facility];  Service: ENT    RECTAL POLYPECTOMY      REPLACEMENT TOTAL KNEE Left     last assessed - 27Apr2015    TONSILLECTOMY      TOTAL ABDOMINAL HYSTERECTOMY      TUBAL LIGATION      US GUIDED THYROID BIOPSY  10/14/2020       Current Outpatient Medications   Medication Sig Dispense Refill    acetaminophen (TYLENOL) 650 mg CR tablet Take 1 tablet (650 mg total) by mouth every 6 (six) hours as needed for mild pain or moderate pain 30 tablet 0    amiodarone 100 mg tablet Take 1 tablet (100 mg total) by mouth daily Decreased Amiodarone to 100 mg once a day 90 tablet 3    apixaban (ELIQUIS) 5 mg Take 1 tablet (5 mg total) by mouth 2 (two) times a day Lot [id number]  Exp 3/2023 42 tablet 0    ascorbic acid (VITAMIN C) 500 mg tablet Take 500 mg by mouth daily       Cholecalciferol (CVS VITAMIN D) 2000 UNITS CAPS Take 2,000 Units by mouth 2 (two) times a day        Chromium Picolinate (CHROMIUM PICOLATE PO) Take 1 tablet by mouth daily      escitalopram (LEXAPRO) 10 mg tablet take 1 tablet by mouth once daily 30 tablet 2    ezetimibe (ZETIA) 10 mg tablet TAKE 1 TABLET BY MOUTH  DAILY 90 tablet 3    famotidine (PEPCID) 20 mg tablet Take 20 mg by mouth daily        gabapentin (NEURONTIN) 300 mg capsule TAKE 2 CAPSULES BY MOUTH AT BEDTIME 180 capsule 4    losartan (COZAAR) 50 mg tablet Take 1 tablet (50 mg total) by mouth 2 (two) times a day 180 tablet 3    metoprolol succinate (TOPROL-XL) 50 mg 24 hr tablet Take 1 tablet (50 mg total) by mouth daily 90 tablet 3    rOPINIRole (REQUIP) 0 25 mg tablet TAKE 1 TABLET BY MOUTH  DAILY AT BEDTIME 90 tablet 3    traMADol (ULTRAM) 50 mg tablet TAKE 1 TABLET BY MOUTH  TWICE DAILY AS NEEDED FOR  PAIN 60 tablet 3    TURMERIC PO Take 1 capsule by mouth 2 (two) times a day      zolpidem (AMBIEN) 10 mg tablet TAKE 1 TABLET BY MOUTH  DAILY AT BEDTIME AS NEEDED  FOR SLEEP 90 tablet 0     No current facility-administered medications for this visit  Allergies   Allergen Reactions    Sotalol Other (See Comments)     Prolonged QTc leading to torsades de pointes     Penicillins Other (See Comments)     As a child calcium deposit in the arm     Ace Inhibitors GI Intolerance     Did feel well on it       Review of Systems   Constitutional: Negative for appetite change, chills, fatigue and fever     HENT: Positive for congestion, postnasal drip and rhinorrhea  Negative for ear pain, sinus pain and sore throat  See HPI   Respiratory: Positive for cough  Negative for shortness of breath and wheezing  See HPI   Cardiovascular: Negative for chest pain and palpitations  Gastrointestinal: Negative for diarrhea, nausea and vomiting  Musculoskeletal: Negative for myalgias  Skin: Negative for rash  Neurological: Negative for headaches  Hematological: Negative for adenopathy  There were no vitals filed for this visit  I spent 6 minutes directly with the patient during this visit    VIRTUAL VISIT [address] verbally agrees to participate in [employer]  Pt is aware that [employer] could be limited without vital signs or the ability to perform a full hands-on physical Dolph Sale understands she or the provider may request at any time to terminate the video visit and request the patient to seek care or treatment in person

## 2021-09-08 ENCOUNTER — TELEPHONE (OUTPATIENT)
Dept: CARDIOLOGY CLINIC | Facility: CLINIC | Age: 76
End: 2021-09-08

## 2021-09-08 DIAGNOSIS — I48.0 PAROXYSMAL ATRIAL FIBRILLATION (HCC): ICD-10-CM

## 2021-09-08 RX ORDER — AMIODARONE HYDROCHLORIDE 100 MG/1
100 TABLET ORAL DAILY
Qty: 90 TABLET | Refills: 3 | Status: CANCELLED | OUTPATIENT
Start: 2021-09-08

## 2021-09-08 NOTE — TELEPHONE ENCOUNTER
Left message on patient's voicemail to call back to confirm amiodarone dose  She stated in message 200 mg am and 100 mg pm, and that it was recently increased  Our records show her being decreased from 200 mg daily to 100 mg daily    Unable to locate increase patient refers to

## 2021-09-10 ENCOUNTER — TELEPHONE (OUTPATIENT)
Dept: CARDIOLOGY CLINIC | Facility: CLINIC | Age: 76
End: 2021-09-10

## 2021-09-10 NOTE — TELEPHONE ENCOUNTER
Patient called for Amiodarone refills  She stated she was taking 200mg AM and 100mg PM     When you started loading patient on 7/15/21, the instructions were 200mg tid X2 weeks, then 200mg q d  I could not find documentation to support any 100mg dosing  I called in Rx for 200mg and instructed her to take daily in the AM until I was able to discuss with you  Should she remain at 200mg daily?

## 2021-09-10 NOTE — TELEPHONE ENCOUNTER
Román Koenig called back, she has been taking Amiodarone 200 mg am and 100 mg pm since 7/15  S/w [employer] in [facility], she will discuss w/ Dr Gerry Pennington and call Román Koenig back  Román Koenig aware

## 2021-09-13 ENCOUNTER — REMOTE DEVICE CLINIC VISIT (OUTPATIENT)
Dept: CARDIOLOGY CLINIC | Facility: CLINIC | Age: 76
End: 2021-09-13
Payer: MEDICARE

## 2021-09-13 DIAGNOSIS — Z95.0 CARDIAC PACEMAKER IN SITU: Primary | ICD-10-CM

## 2021-09-13 PROCEDURE — 93296 REM INTERROG EVL PM/IDS: CPT | Performed by: INTERNAL MEDICINE

## 2021-09-13 PROCEDURE — 93294 REM INTERROG EVL PM/LDLS PM: CPT | Performed by: INTERNAL MEDICINE

## 2021-09-13 NOTE — PROGRESS NOTES
MDT-DUAL CHAMBER PPM (AAIR-DDDR MODE)/ ACTIVE SYSTEM IS MRI CONDITIONAL   CARELINK TRANSMISSION: BATTERY VOLTAGE ADEQUATE (13 5 YRS)  AP 81 4%  <0 1%  ALL AVAILABLE LEAD PARAMETERS WITHIN NORMAL LIMITS  NO HIGH RATE EPISODES  PT ON ELIQUIS, AMIODORONE, METOPROLOL SUCC   NORMAL DEVICE FUNCTION    EBS

## 2021-09-27 DIAGNOSIS — I48.0 PAROXYSMAL ATRIAL FIBRILLATION (HCC): Primary | ICD-10-CM

## 2021-09-29 DIAGNOSIS — G47.01 INSOMNIA DUE TO MEDICAL CONDITION: ICD-10-CM

## 2021-09-30 RX ORDER — ZOLPIDEM TARTRATE 10 MG/1
TABLET ORAL
Qty: 90 TABLET | Refills: 1 | Status: SHIPPED | OUTPATIENT
Start: 2021-09-30 | End: 2022-05-04 | Stop reason: SDUPTHER

## 2021-09-30 NOTE — TELEPHONE ENCOUNTER
08/24/2021 1 08/24/2021 ZOLPIDEM TARTRATE 10 MG TABLET 90 0 90 JA MAR   [id number] [employer] (4448) 0 [place]

## 2021-10-04 ENCOUNTER — ANNUAL EXAM (OUTPATIENT)
Dept: OBGYN CLINIC | Facility: CLINIC | Age: 76
End: 2021-10-04
Payer: MEDICARE

## 2021-10-04 VITALS
DIASTOLIC BLOOD PRESSURE: 78 MMHG | BODY MASS INDEX: 29.44 KG/M2 | SYSTOLIC BLOOD PRESSURE: 130 MMHG | HEIGHT: 62 IN | WEIGHT: 160 LBS

## 2021-10-04 DIAGNOSIS — N95.2 VAGINAL ATROPHY: ICD-10-CM

## 2021-10-04 DIAGNOSIS — Z78.0 POSTMENOPAUSAL: Primary | ICD-10-CM

## 2021-10-04 DIAGNOSIS — Z12.31 ENCOUNTER FOR SCREENING MAMMOGRAM FOR BREAST CANCER: ICD-10-CM

## 2021-10-04 DIAGNOSIS — Z13.820 SCREENING FOR OSTEOPOROSIS: ICD-10-CM

## 2021-10-04 DIAGNOSIS — M81.0 AGE-RELATED OSTEOPOROSIS WITHOUT CURRENT PATHOLOGICAL FRACTURE: ICD-10-CM

## 2021-10-04 PROCEDURE — G0101 CA SCREEN;PELVIC/BREAST EXAM: HCPCS | Performed by: PHYSICIAN ASSISTANT

## 2021-10-11 DIAGNOSIS — I48.0 PAROXYSMAL ATRIAL FIBRILLATION (HCC): ICD-10-CM

## 2021-10-13 ENCOUNTER — REMOTE DEVICE CLINIC VISIT (OUTPATIENT)
Dept: CARDIOLOGY CLINIC | Facility: CLINIC | Age: 76
End: 2021-10-13

## 2021-10-13 DIAGNOSIS — Z95.0 CARDIAC PACEMAKER IN SITU: Primary | ICD-10-CM

## 2021-10-13 PROCEDURE — RECHECK: Performed by: INTERNAL MEDICINE

## 2021-10-14 DIAGNOSIS — Z63.79 STRESS DUE TO ILLNESS OF FAMILY MEMBER: ICD-10-CM

## 2021-10-14 RX ORDER — ESCITALOPRAM OXALATE 10 MG/1
10 TABLET ORAL DAILY
Qty: 30 TABLET | Refills: 2 | Status: SHIPPED | OUTPATIENT
Start: 2021-10-14 | End: 2021-10-26 | Stop reason: SDUPTHER

## 2021-10-15 DIAGNOSIS — I48.0 PAROXYSMAL ATRIAL FIBRILLATION (HCC): ICD-10-CM

## 2021-10-15 DIAGNOSIS — I48.0 PAROXYSMAL ATRIAL FIBRILLATION (HCC): Primary | ICD-10-CM

## 2021-10-15 DIAGNOSIS — Z79.899 LONG TERM CURRENT USE OF AMIODARONE: Primary | ICD-10-CM

## 2021-10-15 DIAGNOSIS — Z79.899 LONG TERM CURRENT USE OF AMIODARONE: ICD-10-CM

## 2021-10-26 DIAGNOSIS — Z63.79 STRESS DUE TO ILLNESS OF FAMILY MEMBER: ICD-10-CM

## 2021-10-26 RX ORDER — ESCITALOPRAM OXALATE 10 MG/1
10 TABLET ORAL DAILY
Qty: 30 TABLET | Refills: 2 | Status: SHIPPED | OUTPATIENT
Start: 2021-10-26 | End: 2021-12-20

## 2021-10-27 ENCOUNTER — APPOINTMENT (OUTPATIENT)
Dept: LAB | Age: 76
End: 2021-10-27
Payer: MEDICARE

## 2021-10-27 DIAGNOSIS — G62.9 NEUROPATHY: ICD-10-CM

## 2021-10-27 DIAGNOSIS — D47.2 MGUS (MONOCLONAL GAMMOPATHY OF UNKNOWN SIGNIFICANCE): ICD-10-CM

## 2021-10-27 DIAGNOSIS — I48.0 PAROXYSMAL ATRIAL FIBRILLATION (HCC): ICD-10-CM

## 2021-10-27 DIAGNOSIS — Z79.899 LONG TERM CURRENT USE OF AMIODARONE: ICD-10-CM

## 2021-10-27 LAB
ALBUMIN SERPL BCP-MCNC: 3.8 G/DL (ref 3.5–5)
ALP SERPL-CCNC: 65 U/L (ref 46–116)
ALT SERPL W P-5'-P-CCNC: 23 U/L (ref 12–78)
ANION GAP SERPL CALCULATED.3IONS-SCNC: 3 MMOL/L (ref 4–13)
AST SERPL W P-5'-P-CCNC: 15 U/L (ref 5–45)
BILIRUB SERPL-MCNC: 1.25 MG/DL (ref 0.2–1)
BUN SERPL-MCNC: 23 MG/DL (ref 5–25)
CALCIUM SERPL-MCNC: 9.7 MG/DL (ref 8.3–10.1)
CHLORIDE SERPL-SCNC: 103 MMOL/L (ref 100–108)
CO2 SERPL-SCNC: 30 MMOL/L (ref 21–32)
CREAT SERPL-MCNC: 1.08 MG/DL (ref 0.6–1.3)
GFR SERPL CREATININE-BSD FRML MDRD: 50 ML/MIN/1.73SQ M
GLUCOSE P FAST SERPL-MCNC: 91 MG/DL (ref 65–99)
IGA SERPL-MCNC: 417 MG/DL (ref 70–400)
IGG SERPL-MCNC: 793 MG/DL (ref 700–1600)
IGM SERPL-MCNC: 58 MG/DL (ref 40–230)
POTASSIUM SERPL-SCNC: 3.9 MMOL/L (ref 3.5–5.3)
PROT SERPL-MCNC: 7.5 G/DL (ref 6.4–8.2)
SODIUM SERPL-SCNC: 136 MMOL/L (ref 136–145)
TSH SERPL DL<=0.05 MIU/L-ACNC: 0.97 UIU/ML (ref 0.36–3.74)

## 2021-10-27 PROCEDURE — 36415 COLL VENOUS BLD VENIPUNCTURE: CPT

## 2021-10-27 PROCEDURE — 84443 ASSAY THYROID STIM HORMONE: CPT

## 2021-10-27 PROCEDURE — 82784 ASSAY IGA/IGD/IGG/IGM EACH: CPT

## 2021-10-27 PROCEDURE — 84165 PROTEIN E-PHORESIS SERUM: CPT

## 2021-10-27 PROCEDURE — 80053 COMPREHEN METABOLIC PANEL: CPT

## 2021-10-27 PROCEDURE — 83883 ASSAY NEPHELOMETRY NOT SPEC: CPT

## 2021-10-27 PROCEDURE — 84165 PROTEIN E-PHORESIS SERUM: CPT | Performed by: PATHOLOGY

## 2021-10-27 PROCEDURE — 82785 ASSAY OF IGE: CPT

## 2021-10-27 PROCEDURE — 82232 ASSAY OF BETA-2 PROTEIN: CPT

## 2021-10-28 LAB
ALBUMIN SERPL ELPH-MCNC: 4.51 G/DL (ref 3.5–5)
ALBUMIN SERPL ELPH-MCNC: 63.5 % (ref 52–65)
ALPHA1 GLOB SERPL ELPH-MCNC: 0.26 G/DL (ref 0.1–0.4)
ALPHA1 GLOB SERPL ELPH-MCNC: 3.7 % (ref 2.5–5)
ALPHA2 GLOB SERPL ELPH-MCNC: 0.7 G/DL (ref 0.4–1.2)
ALPHA2 GLOB SERPL ELPH-MCNC: 9.8 % (ref 7–13)
B2 MICROGLOB SERPL-MCNC: 2.4 MG/L (ref 0.6–2.4)
BETA GLOB ABNORMAL SERPL ELPH-MCNC: 0.45 G/DL (ref 0.4–0.8)
BETA1 GLOB SERPL ELPH-MCNC: 6.3 % (ref 5–13)
BETA2 GLOB SERPL ELPH-MCNC: 5.6 % (ref 2–8)
BETA2+GAMMA GLOB SERPL ELPH-MCNC: 0.4 G/DL (ref 0.2–0.5)
GAMMA GLOB ABNORMAL SERPL ELPH-MCNC: 0.79 G/DL (ref 0.5–1.6)
GAMMA GLOB SERPL ELPH-MCNC: 11.1 % (ref 12–22)
IGG/ALB SER: 1.74 {RATIO} (ref 1.1–1.8)
KAPPA LC FREE SER-MCNC: 20.4 MG/L (ref 3.3–19.4)
KAPPA LC FREE/LAMBDA FREE SER: 1.19 {RATIO} (ref 0.26–1.65)
LAMBDA LC FREE SERPL-MCNC: 17.1 MG/L (ref 5.7–26.3)
M PROTEIN 1 MFR SERPL ELPH: 2.1 %
M PROTEIN 1 SERPL ELPH-MCNC: 0.15 G/DL
PROT PATTERN SERPL ELPH-IMP: ABNORMAL
PROT SERPL-MCNC: 7.1 G/DL (ref 6.4–8.2)
TOTAL IGE SMQN RAST: 23.5 KU/L (ref 0–113)

## 2021-11-03 ENCOUNTER — IMMUNIZATIONS (OUTPATIENT)
Dept: FAMILY MEDICINE CLINIC | Facility: CLINIC | Age: 76
End: 2021-11-03
Payer: MEDICARE

## 2021-11-03 DIAGNOSIS — Z23 ENCOUNTER FOR IMMUNIZATION: Primary | ICD-10-CM

## 2021-11-03 PROCEDURE — 90662 IIV NO PRSV INCREASED AG IM: CPT

## 2021-11-03 PROCEDURE — G0008 ADMIN INFLUENZA VIRUS VAC: HCPCS

## 2021-11-05 ENCOUNTER — APPOINTMENT (OUTPATIENT)
Dept: RADIOLOGY | Facility: MEDICAL CENTER | Age: 76
End: 2021-11-05
Payer: MEDICARE

## 2021-11-05 ENCOUNTER — OFFICE VISIT (OUTPATIENT)
Dept: OBGYN CLINIC | Facility: MEDICAL CENTER | Age: 76
End: 2021-11-05
Payer: MEDICARE

## 2021-11-05 VITALS
SYSTOLIC BLOOD PRESSURE: 134 MMHG | DIASTOLIC BLOOD PRESSURE: 83 MMHG | HEART RATE: 63 BPM | BODY MASS INDEX: 29.44 KG/M2 | RESPIRATION RATE: 18 BRPM | WEIGHT: 160 LBS | HEIGHT: 62 IN

## 2021-11-05 DIAGNOSIS — Z96.652 HISTORY OF TOTAL LEFT KNEE REPLACEMENT: ICD-10-CM

## 2021-11-05 DIAGNOSIS — M17.11 PRIMARY OSTEOARTHRITIS OF RIGHT KNEE: Primary | ICD-10-CM

## 2021-11-05 DIAGNOSIS — G89.29 CHRONIC PAIN OF RIGHT KNEE: ICD-10-CM

## 2021-11-05 DIAGNOSIS — M25.561 CHRONIC PAIN OF RIGHT KNEE: ICD-10-CM

## 2021-11-05 PROCEDURE — 20610 DRAIN/INJ JOINT/BURSA W/O US: CPT | Performed by: ORTHOPAEDIC SURGERY

## 2021-11-05 PROCEDURE — 73560 X-RAY EXAM OF KNEE 1 OR 2: CPT

## 2021-11-05 PROCEDURE — 99214 OFFICE O/P EST MOD 30 MIN: CPT | Performed by: ORTHOPAEDIC SURGERY

## 2021-11-05 RX ORDER — LIDOCAINE HYDROCHLORIDE 10 MG/ML
2 INJECTION, SOLUTION INFILTRATION; PERINEURAL
Status: COMPLETED | OUTPATIENT
Start: 2021-11-05 | End: 2021-11-05

## 2021-11-05 RX ORDER — BETAMETHASONE SODIUM PHOSPHATE AND BETAMETHASONE ACETATE 3; 3 MG/ML; MG/ML
12 INJECTION, SUSPENSION INTRA-ARTICULAR; INTRALESIONAL; INTRAMUSCULAR; SOFT TISSUE
Status: COMPLETED | OUTPATIENT
Start: 2021-11-05 | End: 2021-11-05

## 2021-11-05 RX ORDER — BUPIVACAINE HYDROCHLORIDE 2.5 MG/ML
2 INJECTION, SOLUTION INFILTRATION; PERINEURAL
Status: COMPLETED | OUTPATIENT
Start: 2021-11-05 | End: 2021-11-05

## 2021-11-05 RX ADMIN — BUPIVACAINE HYDROCHLORIDE 2 ML: 2.5 INJECTION, SOLUTION INFILTRATION; PERINEURAL at 15:29

## 2021-11-05 RX ADMIN — BETAMETHASONE SODIUM PHOSPHATE AND BETAMETHASONE ACETATE 12 MG: 3; 3 INJECTION, SUSPENSION INTRA-ARTICULAR; INTRALESIONAL; INTRAMUSCULAR; SOFT TISSUE at 15:29

## 2021-11-05 RX ADMIN — LIDOCAINE HYDROCHLORIDE 2 ML: 10 INJECTION, SOLUTION INFILTRATION; PERINEURAL at 15:29

## 2021-11-08 DIAGNOSIS — I48.0 PAROXYSMAL ATRIAL FIBRILLATION (HCC): ICD-10-CM

## 2021-11-09 ENCOUNTER — HOSPITAL ENCOUNTER (OUTPATIENT)
Dept: PULMONOLOGY | Facility: HOSPITAL | Age: 76
Discharge: HOME | End: 2021-11-09
Attending: INTERNAL MEDICINE
Payer: MEDICARE

## 2021-11-09 DIAGNOSIS — I48.0 PAROXYSMAL ATRIAL FIBRILLATION (HCC): ICD-10-CM

## 2021-11-09 DIAGNOSIS — Z79.899 LONG TERM CURRENT USE OF AMIODARONE: ICD-10-CM

## 2021-11-09 PROCEDURE — 94729 DIFFUSING CAPACITY: CPT | Performed by: INTERNAL MEDICINE

## 2021-11-09 PROCEDURE — 94010 BREATHING CAPACITY TEST: CPT

## 2021-11-09 PROCEDURE — 94729 DIFFUSING CAPACITY: CPT

## 2021-11-09 PROCEDURE — 94010 BREATHING CAPACITY TEST: CPT | Performed by: INTERNAL MEDICINE

## 2021-11-09 PROCEDURE — 94760 N-INVAS EAR/PLS OXIMETRY 1: CPT

## 2021-11-12 ENCOUNTER — REMOTE DEVICE CLINIC VISIT (OUTPATIENT)
Dept: CARDIOLOGY CLINIC | Facility: CLINIC | Age: 76
End: 2021-11-12

## 2021-11-12 DIAGNOSIS — Z95.0 PRESENCE OF PERMANENT CARDIAC PACEMAKER: Primary | ICD-10-CM

## 2021-11-12 PROCEDURE — RECHECK: Performed by: INTERNAL MEDICINE

## 2021-11-17 ENCOUNTER — TELEPHONE (OUTPATIENT)
Dept: CARDIOLOGY CLINIC | Facility: CLINIC | Age: 76
End: 2021-11-17

## 2021-11-18 ENCOUNTER — OFFICE VISIT (OUTPATIENT)
Dept: CARDIOLOGY CLINIC | Facility: CLINIC | Age: 76
End: 2021-11-18
Payer: MEDICARE

## 2021-11-18 VITALS
HEIGHT: 62 IN | SYSTOLIC BLOOD PRESSURE: 130 MMHG | RESPIRATION RATE: 16 BRPM | WEIGHT: 165 LBS | HEART RATE: 61 BPM | DIASTOLIC BLOOD PRESSURE: 92 MMHG | BODY MASS INDEX: 30.36 KG/M2

## 2021-11-18 DIAGNOSIS — I48.0 PAROXYSMAL ATRIAL FIBRILLATION (HCC): Primary | ICD-10-CM

## 2021-11-18 PROCEDURE — 99214 OFFICE O/P EST MOD 30 MIN: CPT | Performed by: PHYSICIAN ASSISTANT

## 2021-11-18 PROCEDURE — 93000 ELECTROCARDIOGRAM COMPLETE: CPT | Performed by: PHYSICIAN ASSISTANT

## 2021-11-24 ENCOUNTER — TELEPHONE (OUTPATIENT)
Dept: FAMILY MEDICINE CLINIC | Facility: CLINIC | Age: 76
End: 2021-11-24

## 2021-12-10 ENCOUNTER — SOCIAL WORK (OUTPATIENT)
Dept: BEHAVIORAL/MENTAL HEALTH CLINIC | Facility: CLINIC | Age: 76
End: 2021-12-10
Payer: MEDICARE

## 2021-12-10 DIAGNOSIS — F41.9 ANXIETY: Primary | ICD-10-CM

## 2021-12-10 PROCEDURE — 90834 PSYTX W PT 45 MINUTES: CPT | Performed by: SOCIAL WORKER

## 2021-12-13 ENCOUNTER — REMOTE DEVICE CLINIC VISIT (OUTPATIENT)
Dept: CARDIOLOGY CLINIC | Facility: CLINIC | Age: 76
End: 2021-12-13

## 2021-12-13 DIAGNOSIS — Z95.0 PRESENCE OF PERMANENT CARDIAC PACEMAKER: Primary | ICD-10-CM

## 2021-12-13 PROCEDURE — RECHECK: Performed by: INTERNAL MEDICINE

## 2021-12-16 PROBLEM — J30.0 VASOMOTOR RHINITIS: Status: ACTIVE | Noted: 2021-12-16

## 2021-12-20 ENCOUNTER — TELEPHONE (OUTPATIENT)
Dept: FAMILY MEDICINE CLINIC | Facility: CLINIC | Age: 76
End: 2021-12-20

## 2021-12-27 ENCOUNTER — TELEPHONE (OUTPATIENT)
Dept: CARDIOLOGY CLINIC | Facility: CLINIC | Age: 76
End: 2021-12-27

## 2021-12-27 ENCOUNTER — TELEPHONE (OUTPATIENT)
Dept: NEUROLOGY | Facility: CLINIC | Age: 76
End: 2021-12-27

## 2021-12-27 DIAGNOSIS — I48.0 PAROXYSMAL ATRIAL FIBRILLATION (HCC): ICD-10-CM

## 2021-12-27 RX ORDER — AMIODARONE HYDROCHLORIDE 100 MG/1
200 TABLET ORAL DAILY
Qty: 90 TABLET | Refills: 3 | Status: CANCELLED
Start: 2021-12-27

## 2021-12-28 ENCOUNTER — SOCIAL WORK (OUTPATIENT)
Dept: BEHAVIORAL/MENTAL HEALTH CLINIC | Facility: CLINIC | Age: 76
End: 2021-12-28
Payer: MEDICARE

## 2021-12-28 DIAGNOSIS — F43.21 ADJUSTMENT DISORDER WITH DEPRESSED MOOD: Primary | ICD-10-CM

## 2021-12-28 PROCEDURE — 90834 PSYTX W PT 45 MINUTES: CPT | Performed by: SOCIAL WORKER

## 2021-12-30 ENCOUNTER — TELEPHONE (OUTPATIENT)
Dept: NEUROLOGY | Facility: CLINIC | Age: 76
End: 2021-12-30

## 2022-01-03 ENCOUNTER — OFFICE VISIT (OUTPATIENT)
Dept: NEUROLOGY | Facility: CLINIC | Age: 77
End: 2022-01-03
Payer: MEDICARE

## 2022-01-03 ENCOUNTER — TELEPHONE (OUTPATIENT)
Dept: NON INVASIVE DIAGNOSTICS | Facility: HOSPITAL | Age: 77
End: 2022-01-03

## 2022-01-03 ENCOUNTER — OFFICE VISIT (OUTPATIENT)
Dept: CARDIOLOGY CLINIC | Facility: CLINIC | Age: 77
End: 2022-01-03
Payer: MEDICARE

## 2022-01-03 VITALS
SYSTOLIC BLOOD PRESSURE: 138 MMHG | WEIGHT: 165.5 LBS | HEART RATE: 94 BPM | BODY MASS INDEX: 30.46 KG/M2 | DIASTOLIC BLOOD PRESSURE: 90 MMHG | HEIGHT: 62 IN | OXYGEN SATURATION: 99 %

## 2022-01-03 VITALS
HEART RATE: 101 BPM | TEMPERATURE: 95.9 F | HEIGHT: 62 IN | BODY MASS INDEX: 30.55 KG/M2 | WEIGHT: 166 LBS | DIASTOLIC BLOOD PRESSURE: 74 MMHG | SYSTOLIC BLOOD PRESSURE: 130 MMHG

## 2022-01-03 DIAGNOSIS — R25.1 TREMORS OF NERVOUS SYSTEM: ICD-10-CM

## 2022-01-03 DIAGNOSIS — D47.2 MGUS (MONOCLONAL GAMMOPATHY OF UNKNOWN SIGNIFICANCE): ICD-10-CM

## 2022-01-03 DIAGNOSIS — I49.5 SSS (SICK SINUS SYNDROME) (HCC): ICD-10-CM

## 2022-01-03 DIAGNOSIS — Z95.0 PACEMAKER: ICD-10-CM

## 2022-01-03 DIAGNOSIS — G62.9 PERIPHERAL POLYNEUROPATHY: Primary | ICD-10-CM

## 2022-01-03 DIAGNOSIS — I50.32 CHRONIC DIASTOLIC HEART FAILURE (HCC): ICD-10-CM

## 2022-01-03 DIAGNOSIS — I48.91 ATRIAL FIBRILLATION, UNSPECIFIED TYPE (HCC): Primary | ICD-10-CM

## 2022-01-03 DIAGNOSIS — R51.9 NEW ONSET HEADACHE: ICD-10-CM

## 2022-01-03 DIAGNOSIS — E78.2 MIXED HYPERLIPIDEMIA: ICD-10-CM

## 2022-01-03 PROCEDURE — 99214 OFFICE O/P EST MOD 30 MIN: CPT | Performed by: NURSE PRACTITIONER

## 2022-01-03 PROCEDURE — 99215 OFFICE O/P EST HI 40 MIN: CPT | Performed by: NURSE PRACTITIONER

## 2022-01-03 PROCEDURE — 93000 ELECTROCARDIOGRAM COMPLETE: CPT | Performed by: NURSE PRACTITIONER

## 2022-01-03 RX ORDER — LIDOCAINE 50 MG/G
OINTMENT TOPICAL AS NEEDED
Qty: 35.44 G | Refills: 0 | Status: SHIPPED | OUTPATIENT
Start: 2022-01-03

## 2022-01-03 NOTE — PROGRESS NOTES
Patient ID: Yeimy Daigle is a 67 y o  female  Assessment/Plan:    Peripheral neuropathy  Patient remains stable in regards to her neuropathy  Exam remains stable with mild acral sensory loss  She denies any muscle weakness, no diminished strength on exam   Labs reviewed, she does have MGUS, she is on B12 supplement for suboptimal B12 level  She continues with heat like sensation around her abdomen thoracic region which is postural   This remains stable  She has no sensory changes along this distribution  No spinal tenderness or muscle spasm  MRI without any nerve impingement, she does have mild spondylosis and some postural kyphosis on exam     In regards to her pain  She is already on gabapentin and Cymbalta with no change  Plan:  -refer to hematology for MGUS  -move gabapentin to 30-60 minutes prior to bedtime to see if this assists with thoracic pain symptoms right before bedtime, she will also trial topical lidocaine  She denies a need for any further change in her pain medications at this time  -consider PT if persists, deferred at this time    Tremors of nervous system  Tremors remains stable  She continues to have some mild difficulty with writing, but reports her tremor does not affect any other functioning in her day-to-day life  No parkinsonian features on exam  We reviewed medication options, she does not wish to start any medication for tremor currently  If worsens we could consider adding a daytime dose of her gabapentin, or consider primidone  She is already on beta-blocker for her AFib  New onset headache  Patient with very brief episodes of mild headache a few times a week of last month  Last about 10 minutes, no migrainous symptoms  No vision changes  She has had increased stress and increase in dietary triggers over the past month  We did discuss management of these  She could consider starting vitamin B2 and magnesium to see if this assists    If no improvement in headaches or worsens she will contact the office for further instruction  Diagnoses and all orders for this visit:    Peripheral polyneuropathy  -     lidocaine (XYLOCAINE) 5 % ointment; Apply topically as needed for mild pain    MGUS (monoclonal gammopathy of unknown significance)  -     Ambulatory referral to Hematology / Oncology; Future    Tremors of nervous system    New onset headache               Subjective:    HPI    Patient is a 70-year-old woman who was referred for evaluation for thoracic pain, as well as for tremors  To review patient  was initially seen by Dr Rahel Serrano in April 2021 for initial consult-reported she has had a warm sensation around her abdomen, which started in 2020  Sensation was described as a heat like a band that started from thoracic region, would radiate to both sides of the abdomen along the rib cage, and came all the way in the front  Symptoms are bilateral and intermittent  Worsened in upright position, and would improve within a minute or 2 if she changed her position or posture  She had been using icy Hot which tends to help  She denied any further pain/paresthesias, bowel, bladder dysfuction, weakness, ambulatory dysfunction  Did not some intermittent lower back pain  She also complained tremors in both hands  Does have family history of essential tremors in brother who is s/p DBS placement  Noted 1 year history of diplopia without ptosis in which she had been evaluated by ophthalmology and was given prisms  She does have a history of fibromyalgia and low back pain in which she is following with pain management and is on gabapentin        Work up Completed:   Blood work done in the hospital included normal TSH   CBC, CMP were normal    Copper, Ceruloplasmin, acetylcholine receptor antibodies, B6-normal  B12 399, MMA elevated at 44  Serum immunofixation: monoclonal gammopathy identified as IgG lambda (0 26 g/dL)-MGUS     MRI of the thoracic spine performed in September 2020: mild spondylosis and osteoarthritis, without any evidence for cord compression, or any neuroforaminal compression  Last office visit 6/2021 in which discussed increasing pain medications due to thoracic discomfort  Interval History:  She did have repeat labs for MGUS- spep with monoclonal peak in the gamma region, immunofixation with elevated IgKappa   beta 2 2 4   IgE, IGG, IGM normal  [id number]    Still continues with numbness and tingling in the feet, is the same  Not bothersome but does become more prominent at night  Denies muscle weakness, generally feels weak, however her afib has been "acting up" and feel this is contributing to feeling of weakness of fatigue  Will be see cardiology later today  She continues with heat sensation in a band like pattern around her ribs, only when she sits  Is present when she is standing or moving around  No change-is about the same  She feels she has gotten more used to it-finds it to be the most bothersome when she is first laying down at night to sleep  She has tried a topical formulation but not aspercream or biofreeze or topical lidocaine  She is on gabapentin 600 mg at bedtime-this was for low back pain, cymbalta 30 mg was recently started by her PCP for mood,  but does not feel this has changed her thoracic or neuropathic pain  She still has tremor in both hands, she finds writing to be more difficult  She is able to read it but will be tremulous at times  She has no difficulty using fork or spoon or hold a cup  No difficulty with ADLs other then some trouble with putting jewelry and buttons, but can manage  The last month she has been having a slight headache 2-3 times per week, varies throughout the day in regards to onset  Located in the temples and back of the head  Describes as aching  Rates 2-3/10  No neck pain, slight photophobia, no phonophobia, no N/V  No hx of headaches   No vision changes other then having more difficulty with small print, she did see an eye dr recently  Headaches last about 10 mins, she hasn't needed any medication as they do not last long enough  Objective:    Blood pressure 130/74, pulse 101, temperature (!) 95 9 °F (35 5 °C), temperature source Temporal, height 5' 2" (1 575 m), weight 75 3 kg (166 lb), last menstrual period 02/01/1990, not currently breastfeeding  Physical Exam  Constitutional:       General: She is awake  HENT:      Right Ear: Hearing normal       Left Ear: Hearing normal    Eyes:      General: Lids are normal       Extraocular Movements: Extraocular movements intact  Pupils: Pupils are equal, round, and reactive to light  Neurological:      Mental Status: She is alert  Deep Tendon Reflexes:      Reflex Scores:       Bicep reflexes are 2+ on the right side  Brachioradialis reflexes are 2+ on the right side and 2+ on the left side  Patellar reflexes are 2+ on the right side and 2+ on the left side  Achilles reflexes are 1+ on the right side and 1+ on the left side  Psychiatric:         Speech: Speech normal          Neurological Exam  Mental Status  Awake and alert  Speech is normal  Language is fluent with no aphasia  Cranial Nerves  CN III, IV, VI: Extraocular movements intact bilaterally  Normal lids and orbits bilaterally  Pupils equal round and reactive to light bilaterally  No ptosis with sustained upward gaze  CN V:  Right: Facial sensation is normal   Left: Facial sensation is normal on the left  CN VII:  Right: There is no facial weakness  Left: There is no facial weakness  CN VIII:  Right: Hearing is normal   Left: Hearing is normal   CN IX, X: Palate elevates symmetrically  CN XI:  Right: Trapezius strength is normal   Left: Trapezius strength is normal   CN XII: Tongue midline without atrophy or fasciculations  Motor  Normal muscle bulk throughout                                               Right                     Left  Neck flexion 5                           Neck extension                     5                           Elbow flexion                         5                          5  Elbow extension                    5                          5  Wrist flexion                           5                          5  Wrist extension                      5                          5  Finger abduction                    5                          5  Hip flexion                              5                          5  Knee flexion                           5                          5  Knee extension                      5                          5  Plantarflexion                         5                          5  Dorsiflexion                            5                          5    Sensory  Light touch is normal in upper and lower extremities  Pinprick abnormality: Normal in bilateral UE, diminished to midfoot in b/l LE    Temperature abnormality: Normal in bilateral UE, diminished to the ankles in b/l LE  Vibration abnormality: Normal in  UE, reduced at b/l great toe (5-6 secs)      Normal sensation to light touch, pin prick, and temperature along the thoracic distribution   Reflexes                                           Right                      Left  Brachioradialis                    2+                         2+  Biceps                                2+                          Patellar                                2+                         2+  Achilles                                1+                         1+  Plantar                           Downgoing                Downgoing    Right pathological reflexes: Haily's absent  Left pathological reflexes: Haily's absent  Coordination  Finger to nose normal except mild tremor noted when approaching nose bilaterally  Postural tremor only noted when hands are supinated bilaterally       Gait  Casual gait is normal including stance, stride, and arm swing  I have personally reviewed the ROS performed by the MA     ROS:    Review of Systems   Constitutional: Negative  Negative for appetite change and fever  HENT: Negative  Negative for hearing loss, tinnitus, trouble swallowing and voice change  Eyes: Negative  Negative for photophobia and pain  Respiratory: Negative  Negative for shortness of breath  Cardiovascular: Negative  Negative for palpitations  Gastrointestinal: Negative  Negative for nausea and vomiting  Endocrine: Negative  Negative for cold intolerance  Genitourinary: Negative  Negative for dysuria, frequency and urgency  Musculoskeletal: Negative  Negative for myalgias and neck pain  Skin: Negative  Negative for rash  Neurological: Positive for tremors, weakness and headaches (2-3 times a week last headache was last night 01/02/22)  Negative for dizziness, seizures, syncope, facial asymmetry, speech difficulty, light-headedness and numbness  Hematological: Negative  Does not bruise/bleed easily  Psychiatric/Behavioral: Positive for sleep disturbance  Negative for confusion and hallucinations

## 2022-01-03 NOTE — ASSESSMENT & PLAN NOTE
Patient remains stable in regards to her neuropathy  Exam remains stable with mild acral sensory loss  She denies any muscle weakness, no diminished strength on exam   Labs reviewed, she does have MGUS, she is on B12 supplement for suboptimal B12 level  She continues with heat like sensation around her abdomen thoracic region which is postural   This remains stable  She has no sensory changes along this distribution  No spinal tenderness or muscle spasm  MRI without any nerve impingement, she does have mild spondylosis and some postural kyphosis on exam     In regards to her pain  She is already on gabapentin and Cymbalta with no change  Plan:  -refer to hematology for MGUS  -move gabapentin to 30-60 minutes prior to bedtime to see if this assists with thoracic pain symptoms right before bedtime, she will also trial topical lidocaine    She denies a need for any further change in her pain medications at this time  -consider PT if persists, deferred at this time

## 2022-01-03 NOTE — PATIENT INSTRUCTIONS
Refer to hematology for MGUS     Move gabapentin to 30-60 mins before bedtime to see if this helps thoracic pain  Trial topical lidocaine for thoracic pain, can also use for neuropathy in feet  Recommend starting vitamin b2 200 mg daily-can urine fluorescent yellow and magnesium oxide 400-500 mg daily is a natural laxative

## 2022-01-03 NOTE — PATIENT INSTRUCTIONS
Maintain a 2 gram daily sodium diet and 1500 ml daily fluid restriction  Check daily weights  If you gained 3 pounds in one day, 5 pounds in one week, or experience worsening shortness of breath or increasing lower leg swelling  Please call the heart failure office at [phone]    Please bring a  list of your current medications and daily weights to the office visit    Cardioversion

## 2022-01-03 NOTE — H&P (VIEW-ONLY)
Cardiology Follow Up    Katty Jimenez  1945  [id number]  [facility] [address] [facility]  [phone]    1  Atrial fibrillation, unspecified type ([address] )  POCT ECG    Cardioversion    ECG 12 lead    ECG 12 lead       Interval History:  On 12/27/21 ms Aram Ospina called our office with complains of heart fluttering on and off since the day prior  HR higher with exertion  It was suggested she be placed back on Amiodarone 200mg daily  DCCV could be considered if symptoms did not improved  12/27/21 device interrogation showed AP <0 1%,  2 1%, 1 AF episode     Ms Rei Adair presents to our office with complaints of a 5 day history of  severe fatigue, shortness of breath with walking and going up stairs  She denies CP, lightheadedness or dizziness        HPI:  Paroxysmal atrial fibrillation [id number], on Eliquis 5mg BID for stroke prevention,  3/12/21 sp cryoablation with pulmonary vein isolation   SSS,  3/15/21 Sp MDT dual chamber PPM insertion   Hypertension  Chronic HFpEF  Hyperlipidemia 4/01/21 , , HDL 66, LDL 88   Thyroid nodule sp Lobectomy    Patient Active Problem List   Diagnosis    Essential hypertension    Allergic rhinitis    Fibromyalgia    Hyperlipidemia    Insomnia    Lumbar spondylosis    Lumbar stenosis    Osteoarthrosis, hand    Osteoarthritis of knee    Osteopenia    Paroxysmal atrial fibrillation (HCC)    Peripheral neuropathy    Restless legs syndrome    TMJ syndrome    Vitamin D deficiency    Osteoarthritis of shoulder    Carpal tunnel syndrome on right    Arthritis of both acromioclavicular joints    Chronic rhinitis    Chronic diastolic CHF (congestive heart failure) (HCC)    Malignant neoplasm of thyroid gland (HCC)    Current use of long term anticoagulation    S/P placement of cardiac pacemaker    Tremors of nervous system    Hx of diplopia    Hurthle cell adenoma of thyroid    MGUS (monoclonal gammopathy of unknown significance)    Vasomotor rhinitis     Past Medical History:   Diagnosis Date    Actinic keratosis     last assessed - [id number]    Arthritis     Atrial fibrillation with rapid ventricular response (HCC)     last assessed - 26Apr2016    Basal cell carcinoma     Benign colon polyp     last assessed - 27Apr2015    Disease of thyroid gland     Effusion of knee joint right     Resolved - 19Apr2016    Esophageal reflux     Fibromyalgia     last assessed - [id number]    Fibromyalgia, primary     GERD (gastroesophageal reflux disease)     Hypertension     Palpitations     last assessed - [id number]    Peroneal tendonitis, right     last assessed - [id number]    Right lumbar radiculopathy 3/17/2016    Thyroid cancer [address] [address] )     Thyroid nodule     Trochanteric bursitis of left hip 3/9/2018     Social History     Socioeconomic History    Marital status: /Civil Union     Spouse name: Not on file    Number of children: Not on file    Years of education: Not on file    Highest education level: Not on file   Occupational History    Not on file   Tobacco Use    Smoking status: Never Smoker    Smokeless tobacco: Never Used   Vaping Use    Vaping Use: Never used   Substance and Sexual Activity    Alcohol use: Not Currently     Comment: occosional - every 3 months    Drug use: Never    Sexual activity: Not Currently   Other Topics Concern    Not on file   Social History Narrative    Not on file     Social Determinants of Health     Financial Resource Strain: Not on file   Food Insecurity: Not on file   Transportation Needs: Not on file   Physical Activity: Not on file   Stress: Not on file   Social Connections: Not on file   Intimate Partner Violence: Not on file   Housing Stability: Not on file      Family History   Problem Relation Age of Onset    Heart disease Mother     Diabetes Mother     Heart disease Father     Coronary artery disease Father     Stroke Father         cerebrovascular accident    Heart attack Father         myocardial infarction    Sudden death Father         scd    Other Family         Back disorder    Coronary artery disease Family     Neuropathy Family     Osteoporosis Family     No Known Problems Daughter     No Known Problems Maternal Grandmother     No Known Problems Maternal Grandfather     No Known Problems Paternal Grandmother     No Known Problems Paternal Grandfather     Cancer Maternal Uncle     Breast cancer Maternal Aunt 72    No Known Problems Son     No Known Problems Maternal Aunt     No Known Problems Maternal Aunt     No Known Problems Maternal Aunt     No Known Problems Paternal Aunt     No Known Problems Paternal Aunt     Anuerysm Neg Hx     Clotting disorder Neg Hx     Arrhythmia Neg Hx     Heart failure Neg Hx      Past Surgical History:   Procedure Laterality Date    CATARACT EXTRACTION Bilateral     COLONOSCOPY  03/2018    EYE SURGERY      HYSTERECTOMY      JOINT REPLACEMENT Left     knee    NM REVISE MEDIAN N/CARPAL TUNNEL SURG Right 11/14/2019    Procedure: RELEASE CARPAL TUNNEL;  Surgeon: Brandie Leong MD;  [facility];  Service: Orthopedics    NM THYROID LOBECTOMY,UNILAT Left 12/16/2020    Procedure: Left THYROID LOBECTOMY;  Surgeon: Lola Zacarias MD;  [facility];  Service: ENT    RECTAL POLYPECTOMY      REPLACEMENT TOTAL KNEE Left     last assessed - [id number]    TONSILLECTOMY      TOTAL ABDOMINAL HYSTERECTOMY      TUBAL LIGATION      US GUIDED THYROID BIOPSY  10/14/2020       Current Outpatient Medications:     acetaminophen (TYLENOL) 650 mg CR tablet, Take 1 tablet (650 mg total) by mouth every 6 (six) hours as needed for mild pain or moderate pain (Patient taking differently: Take 300 mg by mouth 2 (two) times a day  ), Disp: 30 tablet, Rfl: 0    amiodarone 100 mg tablet, Take 1 tablet (100 mg total) by mouth daily Decreased Amiodarone to 100 mg once a day, Disp: 90 tablet, Rfl: 3    apixaban (ELIQUIS) 5 mg, Take 1 tablet (5 mg total) by mouth 2 (two) times a day, Disp: 56 tablet, Rfl: 0    ascorbic acid (VITAMIN C) 500 mg tablet, Take 500 mg by mouth daily , Disp: , Rfl:     Cholecalciferol (CVS VITAMIN D) 2000 UNITS CAPS, Take 2,000 Units by mouth 2 (two) times a day  , Disp: , Rfl:     Chromium Picolinate (CHROMIUM PICOLATE PO), Take 1 tablet by mouth daily, Disp: , Rfl:     DULoxetine (Cymbalta) 30 mg delayed release capsule, Take 1 capsule (30 mg total) by mouth daily Take in the morning with food  , Disp: 30 capsule, Rfl: 1    ezetimibe (ZETIA) 10 mg tablet, TAKE 1 TABLET BY MOUTH  DAILY, Disp: 90 tablet, Rfl: 3    famotidine (PEPCID) 20 mg tablet, Take 20 mg by mouth daily  , Disp: , Rfl:     gabapentin (NEURONTIN) 300 mg capsule, TAKE 2 CAPSULES BY MOUTH AT BEDTIME, Disp: 180 capsule, Rfl: 4    ipratropium (ATROVENT) 0 03 % nasal spray, 2 sprays into each nostril 3 (three) times a day Begin once per day, then progress to twice per day  Progress to three times a day as tolerated  , Disp: 30 mL, Rfl: 6    lidocaine (XYLOCAINE) 5 % ointment, Apply topically as needed for mild pain, Disp: 35 44 g, Rfl: 0    losartan (COZAAR) 50 mg tablet, Take 1 tablet (50 mg total) by mouth 2 (two) times a day, Disp: 180 tablet, Rfl: 3    metoprolol succinate (TOPROL-XL) 50 mg 24 hr tablet, Take 1 tablet (50 mg total) by mouth daily, Disp: 90 tablet, Rfl: 3    rOPINIRole (REQUIP) 0 25 mg tablet, TAKE 1 TABLET BY MOUTH  DAILY AT BEDTIME, Disp: 90 tablet, Rfl: 3    traMADol (ULTRAM) 50 mg tablet, TAKE 1 TABLET BY MOUTH  TWICE DAILY AS NEEDED FOR  PAIN, Disp: 60 tablet, Rfl: 3    TURMERIC PO, Take 1 capsule by mouth 2 (two) times a day, Disp: , Rfl:     zolpidem (AMBIEN) 10 mg tablet, TAKE 1 TABLET BY MOUTH  DAILY AT BEDTIME AS NEEDED  FOR SLEEP, Disp: 90 tablet, Rfl: 1  Allergies   Allergen Reactions    Sotalol Other (See Comments)     Prolonged QTc leading to torsades de pointes     Penicillins Other (See Comments)     As a child calcium deposit in the arm     Ace Inhibitors GI Intolerance     Did feel well on it       Labs:  No visits with results within 2 Month(s) from this visit  Latest known visit with results is:   Appointment on 10/27/2021   Component Date Value    A/G Ratio 10/27/2021 1 74     Albumin Electrophoresis 10/27/2021 63 5     Albumin CONC 10/27/2021 4 51     Alpha 1 10/27/2021 3 7     ALPHA 1 CONC 10/27/2021 0 26     Alpha 2 10/27/2021 9 8     ALPHA 2 CONC 10/27/2021 0 70     Beta-1 10/27/2021 6 3     BETA 1 CONC 10/27/2021 0 45     Beta-2 10/27/2021 5 6     BETA 2 CONC 10/27/2021 0 40     Gamma Globulin 10/27/2021 11 1*    GAMMA CONC 10/27/2021 0 79     M Peak ID 1 10/27/2021 2 10     M PEAK 1 CONC 10/27/2021 0 15     Total Protein 10/27/2021 7 1     SPEP Interpretation 10/27/2021 See Comment     Beta-2 Microglobulin 10/27/2021 2 4     IgE 10/27/2021 23 5     IGA 10/27/2021 417 0*    IGG 10/27/2021 793 0     IGM 10/27/2021 58 0     Ig [place] Free Light Chain 10/27/2021 20 4*    Ig Lambda Free Light [place]* 10/27/2021 17 1     Kappa/Lambda FluidC Ratio 10/27/2021 1 19     TSH 3RD GENERATON 10/27/2021 0 975      Imaging: Cardiac EP device report    Result Date: 12/27/2021  Narrative: MDT-DUAL CHAMBER PPM (AAIR-DDDR MODE)/ ACTIVE SYSTEM IS MRI CONDITIONAL NB CARELINK AF ALERT TRANSMISSION:  BATTERY VOLTAGE ADEQUATE (13 2 YR)  AP <0 1%  2 1%  ALL AVAILABLE LEAD PARAMETERS WITHIN NORMAL LIMITS   1 AF EPISODE (IN PROGRESS) TREATED UNSUCCESSFULLY WITH ATP  PT TAKES ELIQUIS, AMIODARONE, AND METOPROLOL  NORMAL DEVICE FUNCTION  RG     Cardiac EP device report    Result Date: 12/13/2021  Narrative: MDT-DUAL CHAMBER PPM (AAIR-DDDR MODE)/ ACTIVE SYSTEM IS MRI CONDITIONAL CARELINK TRANSMISSION: N/B EPISODE CHECK PER DR GILBERT VOLTAGE ADEQUATE  (13 1 YRS) AP 79%  <1%   ALL AVAILABLE LEAD PARAMETERS WITHIN NORMAL LIMITS  NO SIGNIFICANT HIGH RATE EPISODES  NORMAL DEVICE FUNCTION  ---GRACE       Review of Systems:  Review of Systems   Constitutional: Positive for fatigue  Respiratory: Positive for shortness of breath  Cardiovascular: Positive for palpitations  All other systems reviewed and are negative  Physical Exam:  Physical Exam  Vitals reviewed  Constitutional:       Appearance: She is obese  Cardiovascular:      Rate and Rhythm: Normal rate  Rhythm irregular  Pulmonary:      Effort: Pulmonary effort is normal       Comments: Diminished breath sounds fredis bases   Abdominal:      General: Bowel sounds are normal       Palpations: Abdomen is soft  Musculoskeletal:         General: Normal range of motion  Cervical back: Normal range of motion  Right lower leg: No edema  Left lower leg: No edema  Skin:     General: Skin is warm and dry  Capillary Refill: Capillary refill takes less than 2 seconds  Neurological:      General: No focal deficit present  Mental Status: She is alert and oriented to person, place, and time  Psychiatric:         Mood and Affect: Mood normal          Discussion/Summary:  1  Paroxysmal atrial fibrillation [id number], Dx in 2016, on Eliquis 5mg BID for stroke prevention,  3/12/21 sp cryoablation with pulmonary vein isolation, EKFG shows atrial fibrillation ventricular response 94 BPM, symptomatic with  Severe fatigue and shortness of breath  Continue on   Metoprolol succinate 50 mg daily, amiodarone 100 mg daily  Janaynaveen Whitegerman denies missing doses of Eliquis  Plan for DCCV  Follow up with EP  2  SSS,  3/15/21 Sp MDT dual chamber PPM insertion 12/27/21 device interrogation showed AP <0 1%,  2 1%, 1 AF episode   3  Hypertension  Controlled on losartan 50 mg b i d , metoprolol succinate 50 mg daily  4   Chronic HFpEF LVEF 55% NYHA class III stage C- On PE eu volemic and compensated, weight in the office 165 pounds, continue on  Losartan 50 mg b i d , metoprolol succinate 50 mg daily reinforced 2 g sodium diet  5   Hyperlipidemia 4/01/21 , , HDL 66, LDL 88-   Continue on Zetia 10 mg daily low-fat low-cholesterol diet

## 2022-01-03 NOTE — PROGRESS NOTES
Cardiology Follow Up    Seema Mann  1945  [id number]  [facility] [address] [facility]  [phone]    1  Atrial fibrillation, unspecified type ([address] )  POCT ECG    Cardioversion    ECG 12 lead    ECG 12 lead       Interval History:  On 12/27/21 ms Cesario Gallo called our office with complains of heart fluttering on and off since the day prior  HR higher with exertion  It was suggested she be placed back on Amiodarone 200mg daily  DCCV could be considered if symptoms did not improved  12/27/21 device interrogation showed AP <0 1%,  2 1%, 1 AF episode     Ms Gaby Magallon presents to our office with complaints of a 5 day history of  severe fatigue, shortness of breath with walking and going up stairs  She denies CP, lightheadedness or dizziness        HPI:  Paroxysmal atrial fibrillation [id number], on Eliquis 5mg BID for stroke prevention,  3/12/21 sp cryoablation with pulmonary vein isolation   SSS,  3/15/21 Sp MDT dual chamber PPM insertion   Hypertension  Chronic HFpEF  Hyperlipidemia 4/01/21 , , HDL 66, LDL 88   Thyroid nodule sp Lobectomy    Patient Active Problem List   Diagnosis    Essential hypertension    Allergic rhinitis    Fibromyalgia    Hyperlipidemia    Insomnia    Lumbar spondylosis    Lumbar stenosis    Osteoarthrosis, hand    Osteoarthritis of knee    Osteopenia    Paroxysmal atrial fibrillation (HCC)    Peripheral neuropathy    Restless legs syndrome    TMJ syndrome    Vitamin D deficiency    Osteoarthritis of shoulder    Carpal tunnel syndrome on right    Arthritis of both acromioclavicular joints    Chronic rhinitis    Chronic diastolic CHF (congestive heart failure) (HCC)    Malignant neoplasm of thyroid gland (HCC)    Current use of long term anticoagulation    S/P placement of cardiac pacemaker    Tremors of nervous system    Hx of diplopia    Hurthle cell adenoma of thyroid    MGUS (monoclonal gammopathy of unknown significance)    Vasomotor rhinitis     Past Medical History:   Diagnosis Date    Actinic keratosis     last assessed - [id number]    Arthritis     Atrial fibrillation with rapid ventricular response (HCC)     last assessed - 26Apr2016    Basal cell carcinoma     Benign colon polyp     last assessed - 27Apr2015    Disease of thyroid gland     Effusion of knee joint right     Resolved - 19Apr2016    Esophageal reflux     Fibromyalgia     last assessed - [id number]    Fibromyalgia, primary     GERD (gastroesophageal reflux disease)     Hypertension     Palpitations     last assessed - 30Apr2013    Peroneal tendonitis, right     last assessed - [id number]    Right lumbar radiculopathy 3/17/2016    Thyroid [facility] [address] [address]     Thyroid nodule     Trochanteric bursitis of left hip 3/9/2018     Social History     Socioeconomic History    Marital status: /Civil Union     Spouse name: Not on file    Number of children: Not on file    Years of education: Not on file    Highest education level: Not on file   Occupational History    Not on file   Tobacco Use    Smoking status: Never Smoker    Smokeless tobacco: Never Used   Vaping Use    Vaping Use: Never used   Substance and Sexual Activity    Alcohol use: Not Currently     Comment: occosional - every 3 months    Drug use: Never    Sexual activity: Not Currently   Other Topics Concern    Not on file   Social History Narrative    Not on file     Social Determinants of Health     Financial Resource Strain: Not on file   Food Insecurity: Not on file   Transportation Needs: Not on file   Physical Activity: Not on file   Stress: Not on file   Social Connections: Not on file   Intimate Partner Violence: Not on file   Housing Stability: Not on file      Family History   Problem Relation Age of Onset    Heart disease Mother     Diabetes Mother     Heart disease Father     Coronary artery disease Father     Stroke Father         cerebrovascular accident    Heart attack Father         myocardial infarction    Sudden death Father         scd    Other Family         Back disorder    Coronary artery disease Family     Neuropathy Family     Osteoporosis Family     No Known Problems Daughter     No Known Problems Maternal Grandmother     No Known Problems Maternal Grandfather     No Known Problems Paternal Grandmother     No Known Problems Paternal Grandfather     Cancer Maternal Uncle     Breast cancer Maternal Aunt 72    No Known Problems Son     No Known Problems Maternal Aunt     No Known Problems Maternal Aunt     No Known Problems Maternal Aunt     No Known Problems Paternal Aunt     No Known Problems Paternal Aunt     Anuerysm Neg Hx     Clotting disorder Neg Hx     Arrhythmia Neg Hx     Heart failure Neg Hx      Past Surgical History:   Procedure Laterality Date    CATARACT EXTRACTION Bilateral     COLONOSCOPY  03/2018    EYE SURGERY      HYSTERECTOMY      JOINT REPLACEMENT Left     knee    MS REVISE MEDIAN N/CARPAL TUNNEL SURG Right 11/14/2019    Procedure: RELEASE CARPAL TUNNEL;  Surgeon: Michelle Tsang MD;  [facility];  Service: Orthopedics    MS THYROID LOBECTOMY,UNILAT Left 12/16/2020    Procedure: Left THYROID LOBECTOMY;  Surgeon: Kd Padilla MD;  [facility];  Service: ENT    RECTAL POLYPECTOMY      REPLACEMENT TOTAL KNEE Left     last assessed - [id number]    TONSILLECTOMY      TOTAL ABDOMINAL HYSTERECTOMY      TUBAL LIGATION      US GUIDED THYROID BIOPSY  10/14/2020       Current Outpatient Medications:     acetaminophen (TYLENOL) 650 mg CR tablet, Take 1 tablet (650 mg total) by mouth every 6 (six) hours as needed for mild pain or moderate pain (Patient taking differently: Take 300 mg by mouth 2 (two) times a day  ), Disp: 30 tablet, Rfl: 0    amiodarone 100 mg tablet, Take 1 tablet (100 mg total) by mouth daily Decreased Amiodarone to 100 mg once a day, Disp: 90 tablet, Rfl: 3    apixaban (ELIQUIS) 5 mg, Take 1 tablet (5 mg total) by mouth 2 (two) times a day, Disp: 56 tablet, Rfl: 0    ascorbic acid (VITAMIN C) 500 mg tablet, Take 500 mg by mouth daily , Disp: , Rfl:     Cholecalciferol (CVS VITAMIN D) 2000 UNITS CAPS, Take 2,000 Units by mouth 2 (two) times a day  , Disp: , Rfl:     Chromium Picolinate (CHROMIUM PICOLATE PO), Take 1 tablet by mouth daily, Disp: , Rfl:     DULoxetine (Cymbalta) 30 mg delayed release capsule, Take 1 capsule (30 mg total) by mouth daily Take in the morning with food  , Disp: 30 capsule, Rfl: 1    ezetimibe (ZETIA) 10 mg tablet, TAKE 1 TABLET BY MOUTH  DAILY, Disp: 90 tablet, Rfl: 3    famotidine (PEPCID) 20 mg tablet, Take 20 mg by mouth daily  , Disp: , Rfl:     gabapentin (NEURONTIN) 300 mg capsule, TAKE 2 CAPSULES BY MOUTH AT BEDTIME, Disp: 180 capsule, Rfl: 4    ipratropium (ATROVENT) 0 03 % nasal spray, 2 sprays into each nostril 3 (three) times a day Begin once per day, then progress to twice per day  Progress to three times a day as tolerated  , Disp: 30 mL, Rfl: 6    lidocaine (XYLOCAINE) 5 % ointment, Apply topically as needed for mild pain, Disp: 35 44 g, Rfl: 0    losartan (COZAAR) 50 mg tablet, Take 1 tablet (50 mg total) by mouth 2 (two) times a day, Disp: 180 tablet, Rfl: 3    metoprolol succinate (TOPROL-XL) 50 mg 24 hr tablet, Take 1 tablet (50 mg total) by mouth daily, Disp: 90 tablet, Rfl: 3    rOPINIRole (REQUIP) 0 25 mg tablet, TAKE 1 TABLET BY MOUTH  DAILY AT BEDTIME, Disp: 90 tablet, Rfl: 3    traMADol (ULTRAM) 50 mg tablet, TAKE 1 TABLET BY MOUTH  TWICE DAILY AS NEEDED FOR  PAIN, Disp: 60 tablet, Rfl: 3    TURMERIC PO, Take 1 capsule by mouth 2 (two) times a day, Disp: , Rfl:     zolpidem (AMBIEN) 10 mg tablet, TAKE 1 TABLET BY MOUTH  DAILY AT BEDTIME AS NEEDED  FOR SLEEP, Disp: 90 tablet, Rfl: 1  Allergies   Allergen Reactions    Sotalol Other (See Comments)     Prolonged QTc leading to torsades de pointes     Penicillins Other (See Comments)     As a child calcium deposit in the arm     Ace Inhibitors GI Intolerance     Did feel well on it       Labs:  No visits with results within 2 Month(s) from this visit  Latest known visit with results is:   Appointment on 10/27/2021   Component Date Value    A/G Ratio 10/27/2021 1 74     Albumin Electrophoresis 10/27/2021 63 5     Albumin CONC 10/27/2021 4 51     Alpha 1 10/27/2021 3 7     ALPHA 1 CONC 10/27/2021 0 26     Alpha 2 10/27/2021 9 8     ALPHA 2 CONC 10/27/2021 0 70     Beta-1 10/27/2021 6 3     BETA 1 CONC 10/27/2021 0 45     Beta-2 10/27/2021 5 6     BETA 2 CONC 10/27/2021 0 40     Gamma Globulin 10/27/2021 11 1*    GAMMA CONC 10/27/2021 0 79     M Peak ID 1 10/27/2021 2 10     M PEAK 1 CONC 10/27/2021 0 15     Total Protein 10/27/2021 7 1     SPEP Interpretation 10/27/2021 See Comment     Beta-2 Microglobulin 10/27/2021 2 4     IgE 10/27/2021 23 5     IGA 10/27/2021 417 0*    IGG 10/27/2021 793 0     IGM 10/27/2021 58 0     Ig [place] Free Light Chain 10/27/2021 20 4*    Ig Lambda Free Light [place]* 10/27/2021 17 1     Kappa/Lambda FluidC Ratio 10/27/2021 1 19     TSH 3RD GENERATON 10/27/2021 0 975      Imaging: Cardiac EP device report    Result Date: 12/27/2021  Narrative: MDT-DUAL CHAMBER PPM (AAIR-DDDR MODE)/ ACTIVE SYSTEM IS MRI CONDITIONAL NB CARELINK AF ALERT TRANSMISSION:  BATTERY VOLTAGE ADEQUATE (13 2 YR)  AP <0 1%  2 1%  ALL AVAILABLE LEAD PARAMETERS WITHIN NORMAL LIMITS   1 AF EPISODE (IN PROGRESS) TREATED UNSUCCESSFULLY WITH ATP  PT TAKES ELIQUIS, AMIODARONE, AND METOPROLOL  NORMAL DEVICE FUNCTION  RG     Cardiac EP device report    Result Date: 12/13/2021  Narrative: MDT-DUAL CHAMBER PPM (AAIR-DDDR MODE)/ ACTIVE SYSTEM IS MRI CONDITIONAL CARELINK TRANSMISSION: N/B EPISODE CHECK PER DR GILBERT VOLTAGE ADEQUATE  (13 1 YRS) AP 79%  <1%   ALL AVAILABLE LEAD PARAMETERS WITHIN NORMAL LIMITS  NO SIGNIFICANT HIGH RATE EPISODES  NORMAL DEVICE FUNCTION  ---GRACE       Review of Systems:  Review of Systems   Constitutional: Positive for fatigue  Respiratory: Positive for shortness of breath  Cardiovascular: Positive for palpitations  All other systems reviewed and are negative  Physical Exam:  Physical Exam  Vitals reviewed  Constitutional:       Appearance: She is obese  Cardiovascular:      Rate and Rhythm: Normal rate  Rhythm irregular  Pulmonary:      Effort: Pulmonary effort is normal       Comments: Diminished breath sounds fredis bases   Abdominal:      General: Bowel sounds are normal       Palpations: Abdomen is soft  Musculoskeletal:         General: Normal range of motion  Cervical back: Normal range of motion  Right lower leg: No edema  Left lower leg: No edema  Skin:     General: Skin is warm and dry  Capillary Refill: Capillary refill takes less than 2 seconds  Neurological:      General: No focal deficit present  Mental Status: She is alert and oriented to person, place, and time  Psychiatric:         Mood and Affect: Mood normal          Discussion/Summary:  1  Paroxysmal atrial fibrillation [id number], Dx in 2016, on Eliquis 5mg BID for stroke prevention,  3/12/21 sp cryoablation with pulmonary vein isolation, EKFG shows atrial fibrillation ventricular response 94 BPM, symptomatic with  Severe fatigue and shortness of breath  Continue on   Metoprolol succinate 50 mg daily, amiodarone 100 mg daily  Meaghan Mckenna denies missing doses of Eliquis  Plan for DCCV  Follow up with EP  2  SSS,  3/15/21 Sp MDT dual chamber PPM insertion 12/27/21 device interrogation showed AP <0 1%,  2 1%, 1 AF episode   3  Hypertension  Controlled on losartan 50 mg b i d , metoprolol succinate 50 mg daily  4   Chronic HFpEF LVEF 55% NYHA class III stage C- On PE eu volemic and compensated, weight in the office 165 pounds, continue on  Losartan 50 mg b i d , metoprolol succinate 50 mg daily reinforced 2 g sodium diet  5   Hyperlipidemia 4/01/21 , , HDL 66, LDL 88-   Continue on Zetia 10 mg daily low-fat low-cholesterol diet

## 2022-01-03 NOTE — ASSESSMENT & PLAN NOTE
Tremors remains stable  She continues to have some mild difficulty with writing, but reports her tremor does not affect any other functioning in her day-to-day life  No parkinsonian features on exam  We reviewed medication options, she does not wish to start any medication for tremor currently  If worsens we could consider adding a daytime dose of her gabapentin, or consider primidone  She is already on beta-blocker for her AFib

## 2022-01-06 ENCOUNTER — HOSPITAL ENCOUNTER (OUTPATIENT)
Dept: NON INVASIVE DIAGNOSTICS | Facility: HOSPITAL | Age: 77
Discharge: HOME | End: 2022-01-06
Payer: MEDICARE

## 2022-01-06 ENCOUNTER — TELEPHONE (OUTPATIENT)
Dept: HEMATOLOGY ONCOLOGY | Facility: CLINIC | Age: 77
End: 2022-01-06

## 2022-01-06 ENCOUNTER — ANESTHESIA (OUTPATIENT)
Dept: NON INVASIVE DIAGNOSTICS | Facility: HOSPITAL | Age: 77
End: 2022-01-06

## 2022-01-06 ENCOUNTER — ANESTHESIA EVENT (OUTPATIENT)
Dept: NON INVASIVE DIAGNOSTICS | Facility: HOSPITAL | Age: 77
End: 2022-01-06

## 2022-01-06 VITALS
HEART RATE: 64 BPM | OXYGEN SATURATION: 98 % | DIASTOLIC BLOOD PRESSURE: 50 MMHG | SYSTOLIC BLOOD PRESSURE: 130 MMHG | RESPIRATION RATE: 16 BRPM

## 2022-01-06 DIAGNOSIS — I48.91 ATRIAL FIBRILLATION, UNSPECIFIED TYPE (HCC): ICD-10-CM

## 2022-01-06 LAB
ATRIAL RATE: 64 BPM
P AXIS: 84 DEGREES
PR INTERVAL: 200 MS
QRS AXIS: 32 DEGREES
QRS AXIS: 33 DEGREES
QRSD INTERVAL: 108 MS
QRSD INTERVAL: 98 MS
QT INTERVAL: 352 MS
QT INTERVAL: 456 MS
QTC INTERVAL: 470 MS
QTC INTERVAL: 499 MS
T WAVE AXIS: -17 DEGREES
T WAVE AXIS: 18 DEGREES
VENTRICULAR RATE: 121 BPM
VENTRICULAR RATE: 64 BPM

## 2022-01-06 PROCEDURE — 92960 CARDIOVERSION ELECTRIC EXT: CPT | Performed by: INTERNAL MEDICINE

## 2022-01-06 PROCEDURE — 92960 CARDIOVERSION ELECTRIC EXT: CPT

## 2022-01-06 PROCEDURE — 93010 ELECTROCARDIOGRAM REPORT: CPT | Performed by: INTERNAL MEDICINE

## 2022-01-06 PROCEDURE — 93005 ELECTROCARDIOGRAM TRACING: CPT

## 2022-01-06 RX ORDER — PROPOFOL 10 MG/ML
INJECTION, EMULSION INTRAVENOUS AS NEEDED
Status: DISCONTINUED | OUTPATIENT
Start: 2022-01-06 | End: 2022-01-06

## 2022-01-06 RX ORDER — LIDOCAINE HYDROCHLORIDE 10 MG/ML
INJECTION, SOLUTION EPIDURAL; INFILTRATION; INTRACAUDAL; PERINEURAL AS NEEDED
Status: DISCONTINUED | OUTPATIENT
Start: 2022-01-06 | End: 2022-01-06

## 2022-01-06 RX ORDER — SODIUM CHLORIDE 9 MG/ML
INJECTION, SOLUTION INTRAVENOUS CONTINUOUS PRN
Status: DISCONTINUED | OUTPATIENT
Start: 2022-01-06 | End: 2022-01-06

## 2022-01-06 RX ADMIN — PROPOFOL 70 MG: 10 INJECTION, EMULSION INTRAVENOUS at 09:19

## 2022-01-06 RX ADMIN — SODIUM CHLORIDE: 0.9 INJECTION, SOLUTION INTRAVENOUS at 08:55

## 2022-01-06 RX ADMIN — LIDOCAINE HYDROCHLORIDE 50 MG: 10 INJECTION, SOLUTION EPIDURAL; INFILTRATION; INTRACAUDAL; PERINEURAL at 09:19

## 2022-01-06 NOTE — ANESTHESIA PREPROCEDURE EVALUATION
Procedure:  CARDIOVERSION    Relevant Problems   CARDIO   (+) Essential hypertension   (+) Hyperlipidemia   (+) Paroxysmal atrial fibrillation (HCC)      MUSCULOSKELETAL   (+) Fibromyalgia      NEURO/PSYCH   (+) Fibromyalgia   (+) S/P placement of cardiac pacemaker      Other   (+) Chronic diastolic CHF (congestive heart failure) (HCC)   (+) MGUS (monoclonal gammopathy of unknown significance)   (+) Malignant neoplasm of thyroid gland (HCC)   (+) Peripheral neuropathy        Physical Exam    Airway    Mallampati score: II  TM Distance: >3 FB  Neck ROM: full     Dental   No notable dental hx     Cardiovascular      Pulmonary      Other Findings        Anesthesia Plan  ASA Score- 3     Anesthesia Type- IV sedation with anesthesia with ASA Monitors  Additional Monitors:   Airway Plan:           Plan Factors-    Chart reviewed  EKG reviewed  Existing labs reviewed  Patient summary reviewed  Induction- intravenous  Postoperative Plan-     Informed Consent- Anesthetic plan and risks discussed with patient  I personally reviewed this patient with the CRNA  Discussed and agreed on the Anesthesia Plan with the CRNA  Luther Bolden

## 2022-01-06 NOTE — DISCHARGE INSTRUCTIONS
Cardioversion   WHAT YOU NEED TO KNOW:   Cardioversion is a procedure that uses medicine or electrical shocks to correct arrhythmias  An arrhythmias is a heartbeat that is too slow, too fast, or irregular  It may prevent your body from getting the blood and oxygen it needs  Your heart has 4 chambers, called the atria and ventricles  The atria are at the top of your heart, and the ventricles are at the bottom of your heart  Most arrhythmias that need cardioversion start in the atria  DISCHARGE INSTRUCTIONS:   Call 911 for any of the following:   · You have any of the following signs of a heart attack:      ? Squeezing, pressure, or pain in your chest    ? You may  also have any of the following:     § Discomfort or pain in your back, neck, jaw, stomach, or arm    § Shortness of breath    § Nausea or vomiting    § Lightheadedness or a sudden cold sweat    · You have any of the following signs of a stroke:      ? Numbness or drooping on one side of your face     ? Weakness in an arm or leg    ? Confusion or difficulty speaking    ? Dizziness, a severe headache, or vision loss    · You feel lightheaded, short of breath, and have chest pain  · You cough up blood  · You have trouble breathing  Seek care immediately if:   · You feel your heart beating fast or fluttering  · You feel weak or faint  · Your leg or arm is larger than usual, painful, and warm  Contact your healthcare provider if:   · Your skin is itchy, swollen, or you have a rash  · You have questions or concerns about your condition or care  Medicines: You may need any of the following:  · Heart medicines  help control your heart rate and rhythm  · Blood thinners  help prevent blood clots  Clots can cause strokes, heart attacks, and death  The following are general safety guidelines to follow while you are taking a blood thinner:    ? Watch for bleeding and bruising while you take blood thinners   Watch for bleeding from your gums or nose  Watch for blood in your urine and bowel movements  Use a soft washcloth on your skin, and a soft toothbrush to brush your teeth  This can keep your skin and gums from bleeding  If you shave, use an electric shaver  Do not play contact sports  ? Tell your dentist and other healthcare providers that you take a blood thinner  Wear a bracelet or necklace that says you take this medicine  ? Do not start or stop any other medicines unless your healthcare provider tells you to  Many medicines cannot be used with blood thinners  ? Take your blood thinner exactly as prescribed by your healthcare provider  Do not skip does or take less than prescribed  Tell your provider right away if you forget to take your blood thinner, or if you take too much  ? Warfarin  is a blood thinner that you may need to take  The following are things you should be aware of if you take warfarin:     § Foods and medicines can affect the amount of warfarin in your blood  Do not make major changes to your diet while you take warfarin  Warfarin works best when you eat about the same amount of vitamin K every day  Vitamin K is found in green leafy vegetables and certain other foods  Ask for more information about what to eat when you are taking warfarin  § You will need to see your healthcare provider for follow-up visits when you are on warfarin  You will need regular blood tests  These tests are used to decide how much medicine you need  · Take your medicine as directed  Contact your healthcare provider if you think your medicine is not helping or if you have side effects  Tell him or her if you are allergic to any medicine  Keep a list of the medicines, vitamins, and herbs you take  Include the amounts, and when and why you take them  Bring the list or the pill bottles to follow-up visits  Carry your medicine list with you in case of an emergency  Self-care:   · Rest as directed    Do not drive for at least 24 hours  Ask your healthcare provider when you can return to your normal activities  · Check your heart rate and blood pressure as directed  Ask your healthcare provider what your heart rate and blood pressure should be  · Do not smoke  Nicotine and other chemicals in cigarettes and cigars can cause heart and lung damage  They can also increase your risk for another arrhythmia  Ask your healthcare provider for information if you currently smoke and need help to quit  E-cigarettes or smokeless tobacco still contain nicotine  Talk to your healthcare provider before you use these products  · Eat heart healthy foods  These include fruits, vegetables, whole-grain breads, low-fat dairy products, beans, lean meats, and fish  Replace butter and margarine with heart-healthy oils such as olive oil and canola oil  · Maintain a healthy weight  Ask your healthcare provider how much you should weigh  Ask him to help you create a weight loss plan if you are overweight  Follow up with your doctor as directed:  Write down your questions so you remember to ask them during your visits  © Copyright [employer] 2021 Information is for End User's use only and may not be sold, redistributed or otherwise used for commercial purposes  All illustrations and images included in CareNotes® are the copyrighted property of A D A M , Inc  or [address] [place] [address]  The above information is an  only  It is not intended as medical advice for individual conditions or treatments  Talk to your doctor, nurse or pharmacist before following any medical regimen to see if it is safe and effective for you  Cardioversion   WHAT YOU SHOULD KNOW:   Cardioversion is a procedure to correct arrhythmias, which is when your heart beats too fast or irregularly  Arrhythmias may prevent your body from getting the blood and oxygen it needs   Cardioversion delivers a shock of electricity to your heart to help it return to its normal rhythm  AFTER YOU LEAVE:   Medicines:   · Anticoagulants    are a type of blood thinner medicine that helps prevent clots  Clots can cause strokes, heart attacks, and death  These medicines may cause you to bleed or bruise more easily  ¨ Watch for bleeding from your gums or nose  Watch for blood in your urine and bowel movements  Use a soft washcloth and a soft toothbrush  If you shave, use an electric razor  Avoid activities that can cause bruising or bleeding  ¨ Tell your healthcare provider about all medicines you take because many medicines cannot be used with anticoagulants  Do not start or stop any medicines unless your healthcare provider tells you to  Tell your dentist and other healthcare providers that you take anticoagulants  Wear a bracelet or necklace that says you take this medicine  ¨ You will need regular blood tests so your healthcare provider can decide how much medicine you need  Take anticoagulants exactly as directed  Tell your healthcare provider right away if you forget to take the medicine, or if you take too much  ¨ If you take warfarin, some foods can change how your blood clots  Do not make major changes to your diet while you take warfarin  Warfarin works best when you eat about the same amount of vitamin K every day  Vitamin K is found in green leafy vegetables, broccoli, grapes, and other foods  Ask for more information about what to eat when you take warfarin  · Heart medicine: This medicine helps strengthen or regulate your heartbeat  · Take your medicine as directed  Call your healthcare provider if you think your medicine is not helping or if you have side effects  Tell him if you are allergic to any medicine  Keep a list of the medicines, vitamins, and herbs you take  Include the amounts, and when and why you take them  Bring the list or the pill bottles to follow-up visits  Carry your medicine list with you in case of an emergency    Follow up with your cardiologist as directed:  Write down your questions so you remember to ask them during your visits  Contact your cardiologist if:   · You have a fever  · You have new or worsening weakness or tiredness  · You have questions or concerns about your condition or care  Seek care immediately or call 911 if:   · You feel like your heart is fluttering or jumping in your chest     · The skin around the area where the internal catheter was placed is warm, red, swollen, or has pus coming from it  · You feel lightheaded or you have fainted  · You have chest pain when you take a deep breath or cough  You may cough up blood  · You have discomfort in your chest that feels like squeezing, pressure, fullness, or pain  · You have pain or discomfort in your back, neck, jaw, stomach, or arm  · You have weakness or numbness in part of your body  · You have sudden trouble breathing  · You become confused or have difficulty speaking  · You have dizziness, a severe headache, or vision loss  © 2014 [address] is for End User's use only and may not be sold, redistributed or otherwise used for commercial purposes  All illustrations and images included in CareNotes® are the copyrighted property of A D A M , Inc  or Volodymyr Franco  The above information is an  only  It is not intended as medical advice for individual conditions or treatments  Talk to your doctor, nurse or pharmacist before following any medical regimen to see if it is safe and effective for you  Moderate Sedation   WHAT YOU NEED TO KNOW:   Moderate sedation, or conscious sedation, is medicine used during procedures to help you feel relaxed and calm  You will be awake and able to follow directions without anxiety or pain  You will remember little to none of the procedure  You may feel tired, weak, or unsteady on your feet after you get sedation   You may also have trouble concentrating or short-term memory loss  These symptoms should go away in 24 hours or less  DISCHARGE INSTRUCTIONS:   Call 911 or have someone else call for any of the following:   · You have sudden trouble breathing  · You cannot be woken  Seek care immediately if:   · You have a severe headache or dizziness  · Your heart is beating faster than usual     Contact your healthcare provider if:   · You have a fever  · You have nausea or are vomiting for more than 8 hours after the procedure  · Your skin is itchy, swollen, or you have a rash  · You have questions or concerns about your condition or care  Self-care:   · Have someone stay with you for 24 hours  This person can drive you to errands and help you do things around the house  This person can also watch for problems  · Rest and do quiet activities for 24 hours  Do not exercise, ride a bike, or play sports  Stand up slowly to prevent dizziness and falls  Take short walks around the house with another person  Slowly return to your usual activities the next day  · Do not drive or use dangerous machines or tools for 24 hours  You may injure yourself or others  Examples include a lawnmower, saw, or drill  Do not return to work for 24 hours if you use dangerous machines or tools for work  · Do not make important decisions for 24 hours  For example, do not sign important papers or invest money  · Drink liquids as directed  Liquids help flush the sedation medicine out of your body  Ask how much liquid to drink each day and which liquids are best for you  · Eat small, frequent meals to prevent nausea and vomiting  Start with clear liquids such as juice or broth  If you do not vomit after clear liquids, you can eat your usual foods  · Do not drink alcohol or take medicines that make you drowsy  This includes medicines that help you sleep and anxiety medicines   Ask your healthcare provider if it is safe for you to take pain medicine  Follow up with your healthcare provider as directed:  Write down your questions so you remember to ask them during your visits  © Copyright [employer] 2021 Information is for End User's use only and may not be sold, redistributed or otherwise used for commercial purposes  All illustrations and images included in CareNotes® are the copyrighted property of [email or web address] Inc  or Jeff Anderson  The above information is an  only  It is not intended as medical advice for individual conditions or treatments  Talk to your doctor, nurse or pharmacist before following any medical regimen to see if it is safe and effective for you

## 2022-01-06 NOTE — ANESTHESIA POSTPROCEDURE EVALUATION
Post-Op Assessment Note    CV Status:  Stable  Pain Score: 0    Pain management: adequate     Mental Status:  Alert and awake   Hydration Status:  Euvolemic   PONV Controlled:  Controlled   Airway Patency:  Patent      Post Op Vitals Reviewed: Yes      Staff: Anesthesiologist, CRNA         No complications documented      /62 (01/06/22 0931)    Temp      Pulse 62 (01/06/22 0931)   Resp 20 (01/06/22 0931)    SpO2 100 % (01/06/22 0931)

## 2022-01-06 NOTE — INTERVAL H&P NOTE
H&P reviewed  After examining the patient I find no changes in the patients condition since the H&P had been written  Patient reports feeling well today and has no complaints  Risks and benefits of cardioversion were reviewed with the patient and she agrees to proceed       Helen David MD  Cardiology fellow

## 2022-01-07 ENCOUNTER — TELEPHONE (OUTPATIENT)
Dept: FAMILY MEDICINE CLINIC | Facility: CLINIC | Age: 77
End: 2022-01-07

## 2022-01-07 ENCOUNTER — TELEPHONE (OUTPATIENT)
Dept: HEMATOLOGY ONCOLOGY | Facility: CLINIC | Age: 77
End: 2022-01-07

## 2022-01-07 DIAGNOSIS — Z11.52 ENCOUNTER FOR SCREENING FOR COVID-19: Primary | ICD-10-CM

## 2022-01-07 PROCEDURE — U0003 INFECTIOUS AGENT DETECTION BY NUCLEIC ACID (DNA OR RNA); SEVERE ACUTE RESPIRATORY SYNDROME CORONAVIRUS 2 (SARS-COV-2) (CORONAVIRUS DISEASE [COVID-19]), AMPLIFIED PROBE TECHNIQUE, MAKING USE OF HIGH THROUGHPUT TECHNOLOGIES AS DESCRIBED BY CMS-2020-01-R: HCPCS | Performed by: FAMILY MEDICINE

## 2022-01-07 NOTE — TELEPHONE ENCOUNTER
New Patient Intake Form   Patient Details:    Caitlin Sagastume  1945  [id number]    Appointment Information   Who is calling to schedule? Mariann   If not self, what is the caller's name? Please put name of [facility] nurse as well  Referring provider Balaji Clay   What is the diagnosis? MGUS    Is there a confirmed tissue diagnosis? N/A   Is patient aware of diagnosis? Yes   Have you had any imaging or labs done? If yes, where? (If imaging done outside of [facility], please remind patient to bring a disk ) Labs    Have you been seen by another Oncologist/Hematologist?  If so, who and where? No   Are the records in [facility] or Care Everywhere? Yes   Are records needed from an outside facility? No   If yes, Name of facility, city and state where facility is located  Preferred [facility]   Is the patient willing to be seen by another provider?   (This is for breast patients only) Yes   Miscellaneous Information:

## 2022-01-07 NOTE — TELEPHONE ENCOUNTER
Carolyne Phelps woke up with stuffy nose sorethroat and head congestion and would like a covid test ordered please

## 2022-01-10 ENCOUNTER — TELEPHONE (OUTPATIENT)
Dept: FAMILY MEDICINE CLINIC | Facility: CLINIC | Age: 77
End: 2022-01-10

## 2022-01-10 LAB — SARS-COV-2 RNA RESP QL NAA+PROBE: POSITIVE

## 2022-01-10 NOTE — TELEPHONE ENCOUNTER
Call patient  COVID-19 test positive  Vaccinated  Not a candidate for monoclonal antibody therapy  Check on status  Review isolation precautions    Patient can be offered a a virtual visit depending on how severe her symptoms are

## 2022-01-10 NOTE — TELEPHONE ENCOUNTER
Spoke with patient, gave message  She still has headache, coughing and short of breath  Today she is feeling little better than last few days  I mentioned video visit and she is not sure if she would be able to do that, but she can try if you think she needs one

## 2022-01-11 NOTE — TELEPHONE ENCOUNTER
I spoke with patient, she is having shortness of breath when she gets up and moves around  Steroid Inhaler?  [facility]

## 2022-01-12 ENCOUNTER — TELEPHONE (OUTPATIENT)
Dept: FAMILY MEDICINE CLINIC | Facility: CLINIC | Age: 77
End: 2022-01-12

## 2022-01-12 NOTE — TELEPHONE ENCOUNTER
Insurance does not cover Budesonide which we sent yesterday to the pharmacy  Patient is requesting an alternative inhaler

## 2022-01-18 ENCOUNTER — TELEPHONE (OUTPATIENT)
Dept: FAMILY MEDICINE CLINIC | Facility: CLINIC | Age: 77
End: 2022-01-18

## 2022-01-18 ENCOUNTER — OFFICE VISIT (OUTPATIENT)
Dept: FAMILY MEDICINE CLINIC | Facility: CLINIC | Age: 77
End: 2022-01-18
Payer: MEDICARE

## 2022-01-18 ENCOUNTER — APPOINTMENT (OUTPATIENT)
Dept: RADIOLOGY | Facility: MEDICAL CENTER | Age: 77
End: 2022-01-18
Payer: MEDICARE

## 2022-01-18 VITALS
TEMPERATURE: 98.6 F | HEART RATE: 82 BPM | HEIGHT: 62 IN | DIASTOLIC BLOOD PRESSURE: 92 MMHG | WEIGHT: 155 LBS | RESPIRATION RATE: 18 BRPM | OXYGEN SATURATION: 96 % | BODY MASS INDEX: 28.52 KG/M2 | SYSTOLIC BLOOD PRESSURE: 162 MMHG

## 2022-01-18 DIAGNOSIS — S83.92XA SPRAIN OF LEFT KNEE, UNSPECIFIED LIGAMENT, INITIAL ENCOUNTER: ICD-10-CM

## 2022-01-18 DIAGNOSIS — S93.492A SPRAIN OF ANTERIOR TALOFIBULAR LIGAMENT OF LEFT ANKLE, INITIAL ENCOUNTER: ICD-10-CM

## 2022-01-18 DIAGNOSIS — M85.852 OSTEOPENIA OF NECK OF LEFT FEMUR: Primary | ICD-10-CM

## 2022-01-18 PROCEDURE — 73610 X-RAY EXAM OF ANKLE: CPT

## 2022-01-18 PROCEDURE — 99213 OFFICE O/P EST LOW 20 MIN: CPT | Performed by: PHYSICIAN ASSISTANT

## 2022-01-18 PROCEDURE — 73564 X-RAY EXAM KNEE 4 OR MORE: CPT

## 2022-01-18 NOTE — PROGRESS NOTES
Assessment/Plan:     Diagnoses and all orders for this visit:    Osteopenia of neck of left femur    Sprain of anterior talofibular ligament of left ankle, initial encounter  - Ordered Xray, unable to rule out clinically significant fractures with [place] knee rules  Encouraged rest ice compression and elevation  -     Cam Boot  -     XR ankle 3+ vw left; Future    Sprain of left knee, unspecified ligament, initial encounter  - Ordered Xray, unable to rule out clinically significant fractures with [place] knee rules  -     XR knee 4+ vw left injury; Future          Subjective:    Patient ID: Christiano Marroquin is a 67 y o  female  Pt is presenting today for Left ankle and knee pain since twisting ankle this morning getting out of car onto icy suface   "twisted my left ankle" but denies any pop  Able to bear weight but unable to walk due to pain  Tenderness of left lateral malleolus  Also having generalized anterior knee pain  The following portions of the patient's history were reviewed and updated as appropriate: allergies, current medications and problem list     Review of Systems   Constitutional: Negative for fatigue, fever and unexpected weight change  Respiratory: Negative for cough, shortness of breath and wheezing  Cardiovascular: Negative for chest pain, palpitations and leg swelling  Gastrointestinal: Negative for constipation, diarrhea and nausea  Musculoskeletal: Positive for arthralgias (left ankle pain)  Negative for myalgias  Objective:  /92 (BP Location: Left arm, Patient Position: Sitting, Cuff Size: Standard)   Pulse 82   Temp 98 6 °F (37 °C)   Resp 18   Ht 5' 2" (1 575 m)   Wt 70 3 kg (155 lb)   LMP 02/01/1990 (Within Weeks)   SpO2 96%   Breastfeeding No   BMI 28 35 kg/m²      Physical Exam  Vitals reviewed  Constitutional:       General: She is not in acute distress  Appearance: She is well-developed  She is not diaphoretic     HENT:      Head: Normocephalic and atraumatic  Eyes:      Pupils: Pupils are equal, round, and reactive to light  Cardiovascular:      Rate and Rhythm: Normal rate and regular rhythm  Heart sounds: Normal heart sounds  No murmur heard  No friction rub  No gallop  Pulmonary:      Effort: Pulmonary effort is normal  No respiratory distress  Breath sounds: Normal breath sounds  No wheezing or rales  Musculoskeletal:      Cervical back: Normal range of motion and neck supple  Left ankle: Swelling (lateral) present  Tenderness (lateral malleolus) present over the lateral malleolus  No medial malleolus tenderness  Normal range of motion  Anterior drawer test negative  Normal pulse  Skin:     General: Skin is warm  Neurological:      Mental Status: She is alert and oriented to person, place, and time  Mental status is at baseline  Psychiatric:         Behavior: Behavior normal          Thought Content:  Thought content normal

## 2022-01-18 NOTE — TELEPHONE ENCOUNTER
Daughter called she took the [employer] to [facility] and they do not carry it   Per pharmacy it needs to go through [facility]

## 2022-01-18 NOTE — TELEPHONE ENCOUNTER
[facility] does not carry but [facility] does faxed everything over and they will contact patient's daughter to coordinate

## 2022-01-19 ENCOUNTER — TELEPHONE (OUTPATIENT)
Dept: FAMILY MEDICINE CLINIC | Facility: CLINIC | Age: 77
End: 2022-01-19

## 2022-01-19 NOTE — TELEPHONE ENCOUNTER
Call reviewed x-rays no fracture seen mild swelling around the left knee and left ankle    Procedure: XR knee 4+ vw left injury    Result Date: 1/19/2022  Narrative: LEFT KNEE INDICATION:  Follow up surgery  Covid 19 positive test date 1/7/2022 COMPARISON: Left knee radiograph 11/5/2021 VIEWS:  AP, bilateral oblique and lateral IMAGES:  4 FINDINGS: Total knee arthroplasty appears in satisfactory position  No evidence of hardware failure  There is no acute fracture or dislocation  There is a small joint effusion  No lytic or blastic lesions are seen  Soft tissues are unremarkable  Impression: Unremarkable appearance of total knee arthroplasty  Small joint effusion  Workstation performed: [id number]     Procedure: XR ankle 3+ vw left    Result Date: 1/19/2022  Narrative: LEFT ANKLE INDICATION:   H78 [id number]: Sprain of other ligament of left ankle, initial encounter  COMPARISON:  None VIEWS:  XR ANKLE 3+ VW LEFT FINDINGS: There is no acute fracture or dislocation  Calcaneal spur(s) noted  No lytic or blastic osseous lesion  There is soft tissue swelling over the lateral malleolus  Impression: Soft tissue swelling over the lateral malleolus without acute osseous injury  Workstation performed: [id number]     Procedure: Cardiac EP device report    Result Date: 12/27/2021  Narrative: MDT-DUAL CHAMBER PPM (AAIR-DDDR MODE)/ ACTIVE SYSTEM IS MRI CONDITIONAL NB CARELINK AF ALERT TRANSMISSION:  BATTERY VOLTAGE ADEQUATE (13 2 YR)  AP <0 1%  2 1%  ALL AVAILABLE LEAD PARAMETERS WITHIN NORMAL LIMITS   1 AF EPISODE (IN PROGRESS) TREATED UNSUCCESSFULLY WITH ATP  PT TAKES ELIQUIS, AMIODARONE, AND METOPROLOL  NORMAL DEVICE FUNCTION  RG     Procedure: Cardioversion    Result Date: 1/6/2022  Narrative: Shruthi Mckay is a 67 y o  female who follows with Dr Cheryle Harper in the office  Indication: Symptomatic atrial fibrillation Anti-coagulation: Apixaban taken uninterrupted for one month prior to procedure  Anesthesia: Conscious sedation provided by anesthesiology with propofol  Electrical Pad Placement: Anteroposteriorly interscapular region and upper sternum  Successful cardioversion following a single synchronized biphasic shock at 200 J  Complications: None Sinus rhythm documented by 12 lead ECG  Disposition: Echo lab recovery area followed by discharge to home  Cardiology fellow:  Shani Soni MD Attending: Brett Gross DO

## 2022-01-28 ENCOUNTER — TELEPHONE (OUTPATIENT)
Dept: HEMATOLOGY ONCOLOGY | Facility: CLINIC | Age: 77
End: 2022-01-28

## 2022-01-28 NOTE — TELEPHONE ENCOUNTER
Patient called to reschedule her appt with Janine Ferrara in [facility] today @ 11:20am to 2-10-22 @ 9:40am

## 2022-01-31 DIAGNOSIS — I48.0 PAROXYSMAL ATRIAL FIBRILLATION (HCC): ICD-10-CM

## 2022-01-31 RX ORDER — AMIODARONE HYDROCHLORIDE 200 MG/1
200 TABLET ORAL DAILY
Qty: 30 TABLET | Refills: 11 | Status: SHIPPED | OUTPATIENT
Start: 2022-01-31 | End: 2022-02-16 | Stop reason: SDUPTHER

## 2022-02-07 DIAGNOSIS — F33.0 MILD EPISODE OF RECURRENT MAJOR DEPRESSIVE DISORDER (HCC): ICD-10-CM

## 2022-02-07 RX ORDER — DULOXETIN HYDROCHLORIDE 30 MG/1
CAPSULE, DELAYED RELEASE ORAL
Qty: 30 CAPSULE | Refills: 2 | Status: SHIPPED | OUTPATIENT
Start: 2022-02-07 | End: 2022-05-16 | Stop reason: SDUPTHER

## 2022-02-10 ENCOUNTER — CONSULT (OUTPATIENT)
Dept: HEMATOLOGY ONCOLOGY | Facility: CLINIC | Age: 77
End: 2022-02-10
Payer: MEDICARE

## 2022-02-10 VITALS
OXYGEN SATURATION: 99 % | BODY MASS INDEX: 30.55 KG/M2 | TEMPERATURE: 98.5 F | SYSTOLIC BLOOD PRESSURE: 126 MMHG | DIASTOLIC BLOOD PRESSURE: 80 MMHG | RESPIRATION RATE: 16 BRPM | WEIGHT: 166 LBS | HEART RATE: 82 BPM | HEIGHT: 62 IN

## 2022-02-10 DIAGNOSIS — C73 PAPILLARY MICROCARCINOMA OF THYROID (HCC): ICD-10-CM

## 2022-02-10 DIAGNOSIS — Z95.0 S/P PLACEMENT OF CARDIAC PACEMAKER: ICD-10-CM

## 2022-02-10 DIAGNOSIS — D47.2 MGUS (MONOCLONAL GAMMOPATHY OF UNKNOWN SIGNIFICANCE): ICD-10-CM

## 2022-02-10 DIAGNOSIS — M48.061 SPINAL STENOSIS OF LUMBAR REGION WITHOUT NEUROGENIC CLAUDICATION: ICD-10-CM

## 2022-02-10 DIAGNOSIS — R25.1 TREMORS OF NERVOUS SYSTEM: ICD-10-CM

## 2022-02-10 DIAGNOSIS — M17.11 PRIMARY OSTEOARTHRITIS OF RIGHT KNEE: ICD-10-CM

## 2022-02-10 DIAGNOSIS — I48.0 PAROXYSMAL ATRIAL FIBRILLATION (HCC): ICD-10-CM

## 2022-02-10 DIAGNOSIS — C73 MALIGNANT NEOPLASM OF THYROID GLAND (HCC): Primary | ICD-10-CM

## 2022-02-10 PROCEDURE — 99204 OFFICE O/P NEW MOD 45 MIN: CPT | Performed by: INTERNAL MEDICINE

## 2022-02-10 NOTE — PROGRESS NOTES
Consultation - Medical Oncology   Anthony Wallace 67 y o  female MRN: [id number]  Unit/Bed#:  Encounter: [id number]  Date of consultation:  02/10/2022  Referring physician:  Dr Tomasz Raines  Reason for Consult:  MGUS  HPI: Anthony Wallace is a 67y o  year old female   Patient was getting workup for feeling hot sensation across the abdomen on sitting position and that has been going on for few years  Occasionally headaches  Some nights tingling and numbness in her feet  Decreased urinary stream at night and  Dyspnea on exertion  Also she has arthritis in her knees  She states she was under stress when her  was in the nursing home  History of COVID in early January 2022  Workup showed a small spike of 150 mg of IgG lambda monoclonal gammopathy  She was advised consultation with our group for MGUS  Looking back at her record she had MGUS in April 2021 and spike was 260 mg  History of thyroid cancer  History of  tremors in her and family members  History of AFib and pacemaker  History of spinal stenosis  ROS:  02/10/22 Reviewed 12 systems: See symptoms in HPI  Presently no other neurological, cardiac, pulmonary, GI and  symptoms other than listed in HPI  Other symptoms are in HPI  No  fever, chills, bleeding, bone pains, skin rash, weight loss, night sweats,  tiredness , weakness,  claudication and gait problem  No frequent infections  Not unusually sensitive to heat or cold  No swelling of the ankles  No swollen glands  Patient is anxious         Historical Information   Past Medical History:   Diagnosis Date    Actinic keratosis     last assessed - [id number]    Arthritis     Atrial fibrillation with rapid ventricular response (HCC)     last assessed - 26Apr2016    Basal cell carcinoma     Benign colon polyp     last assessed - 27Apr2015    Disease of thyroid gland     Effusion of knee joint right     Resolved - [id number]    Esophageal reflux     Fibromyalgia     last assessed - [id number]    Fibromyalgia, primary     GERD (gastroesophageal reflux disease)     Hypertension     Palpitations     last assessed - [id number]    Peroneal tendonitis, right     last assessed - [id number]    Right lumbar radiculopathy 3/17/2016    Thyroid cancer ([address] )     Thyroid nodule     Trochanteric bursitis of left hip 3/9/2018     Past Surgical History:   Procedure Laterality Date    CATARACT EXTRACTION Bilateral     COLONOSCOPY  03/2018    EYE SURGERY      HYSTERECTOMY      JOINT REPLACEMENT Left     knee    NC REVISE MEDIAN N/CARPAL TUNNEL SURG Right 11/14/2019    Procedure: RELEASE CARPAL TUNNEL;  Surgeon: Myron Henry MD;  [facility];  Service: Orthopedics    NC THYROID LOBECTOMY,UNILAT Left 12/16/2020    Procedure: Left THYROID LOBECTOMY;  Surgeon: Nayla Javier MD;  [facility];  Service: ENT    RECTAL POLYPECTOMY      REPLACEMENT TOTAL KNEE Left     last assessed - 27Apr2015    TONSILLECTOMY      TOTAL ABDOMINAL HYSTERECTOMY      TUBAL LIGATION      US GUIDED THYROID BIOPSY  10/14/2020     Social History   Social History     Substance and Sexual Activity   Alcohol Use Not Currently    Comment: occosional - every 3 months     Social History     Substance and Sexual Activity   Drug Use Never     Social History     Tobacco Use   Smoking Status Never Smoker   Smokeless Tobacco Never Used     Family History:   Family History   Problem Relation Age of Onset    Heart disease Mother     Diabetes Mother     Heart disease Father     Coronary artery disease Father     Stroke Father         cerebrovascular accident    Heart attack Father         myocardial infarction   Dawn Riis Sudden death Father         scd    Other Family         Back disorder    Coronary artery disease Family     Neuropathy Family     Osteoporosis Family     No Known Problems Daughter     No Known Problems Maternal Grandmother     No Known Problems Maternal Grandfather     No Known Problems Paternal Grandmother     No Known Problems Paternal Grandfather     Cancer Maternal Uncle     Breast cancer Maternal Aunt 72    No Known Problems Son     No Known Problems Maternal Aunt     No Known Problems Maternal Aunt     No Known Problems Maternal Aunt     No Known Problems Paternal Aunt     No Known Problems Paternal Aunt     Anuerysm Neg Hx     Clotting disorder Neg Hx     Arrhythmia Neg Hx     Heart failure Neg Hx          Current Outpatient Medications:     acetaminophen (TYLENOL) 650 mg CR tablet, Take 1 tablet (650 mg total) by mouth every 6 (six) hours as needed for mild pain or moderate pain (Patient taking differently: Take 300 mg by mouth 2 (two) times a day  ), Disp: 30 tablet, Rfl: 0    amiodarone 200 mg tablet, Take 1 tablet (200 mg total) by mouth daily, Disp: 30 tablet, Rfl: 11    apixaban (ELIQUIS) 5 mg, Take 1 tablet (5 mg total) by mouth 2 (two) times a day, Disp: 56 tablet, Rfl: 0    ascorbic acid (VITAMIN C) 500 mg tablet, Take 500 mg by mouth daily , Disp: , Rfl:     Cholecalciferol (CVS VITAMIN D) 2000 UNITS CAPS, Take 2,000 Units by mouth 2 (two) times a day  , Disp: , Rfl:     Chromium Picolinate (CHROMIUM PICOLATE PO), Take 1 tablet by mouth daily, Disp: , Rfl:     DULoxetine (CYMBALTA) 30 mg delayed release capsule, take 1 capsule by mouth daily every morning take with food, Disp: 30 capsule, Rfl: 2    ezetimibe (ZETIA) 10 mg tablet, TAKE 1 TABLET BY MOUTH  DAILY, Disp: 90 tablet, Rfl: 3    famotidine (PEPCID) 20 mg tablet, Take 20 mg by mouth daily  , Disp: , Rfl:     fluticasone (FLOVENT HFA) 110 MCG/ACT inhaler, Inhale 2 puffs 2 (two) times a day Rinse mouth after use , Disp: 12 g, Rfl: 0    gabapentin (NEURONTIN) 300 mg capsule, TAKE 2 CAPSULES BY MOUTH AT BEDTIME, Disp: 180 capsule, Rfl: 4    ipratropium (ATROVENT) 0 03 % nasal spray, 2 sprays into each nostril 3 (three) times a day Begin once per day, then progress to twice per day   Progress to three times a day as tolerated  , Disp: 30 mL, Rfl: 6    lidocaine (XYLOCAINE) 5 % ointment, Apply topically as needed for mild pain, Disp: 35 44 g, Rfl: 0    losartan (COZAAR) 50 mg tablet, Take 1 tablet (50 mg total) by mouth 2 (two) times a day, Disp: 180 tablet, Rfl: 3    metoprolol succinate (TOPROL-XL) 50 mg 24 hr tablet, Take 1 tablet (50 mg total) by mouth daily, Disp: 90 tablet, Rfl: 3    rOPINIRole (REQUIP) 0 25 mg tablet, TAKE 1 TABLET BY MOUTH  DAILY AT BEDTIME, Disp: 90 tablet, Rfl: 3    traMADol (ULTRAM) 50 mg tablet, TAKE 1 TABLET BY MOUTH  TWICE DAILY AS NEEDED FOR  PAIN, Disp: 60 tablet, Rfl: 3    TURMERIC PO, Take 1 capsule by mouth 2 (two) times a day, Disp: , Rfl:     zolpidem (AMBIEN) 10 mg tablet, TAKE 1 TABLET BY MOUTH  DAILY AT BEDTIME AS NEEDED  FOR SLEEP, Disp: 90 tablet, Rfl: 1    Allergies   Allergen Reactions    Sotalol Other (See Comments)     Prolonged QTc leading to torsades de pointes     Penicillins Other (See Comments)     As a child calcium deposit in the arm     Ace Inhibitors GI Intolerance     Did feel well on it     @ ROS@  Physical Exam:  Vitals:    02/10/22 0949   BP: 126/80   BP Location: Right arm   Patient Position: Sitting   Cuff Size: Adult   Pulse: 82   Resp: 16   Temp: 98 5 °F (36 9 °C)   SpO2: 99%   Weight: 75 3 kg (166 lb)   Height: 5' 2" (1 575 m)     Alert, oriented, not in distress, no icterus, no oral thrush, no palpable neck mass, clear lung fields, regular heart rate, abdomen  soft and non tender, no palpable abdominal mass, no ascites, no edema of ankles, no calf tenderness, no objective focal neurological deficit, no skin rash, no palpable lymphadenopathy in the neck and axillary areas,  no clubbing  Patient is anxious  Performance status 1  Lab Results: I have reviewed all pertinent labs    LABS:  Results for orders placed or performed in visit on 01/07/22   COVID Only- Office Collect    Specimen: Nose; Nares   Result Value Ref Range    SARS-CoV-2 Positive (A) Negative   In April 2021 hemoglobin 12 6  WBC 6670  Normal differential count  MCV 98  Platelet [id number]  M spike to 160 mg , IgG lambda  In October 2021 M spike 150 mg  Normal chemistry profile except anion gap 3 and total bilirubin 1 25  Normal beta 2 microglobulin  IgA 417 with normal IgG and IgM  Normal free light chain ratio  Imaging Studies: I have personally reviewed pertinent reports  Pathology, and Other Studies: I have personally reviewed pertinent reports  Assessment and Plan:  See diagnoses, orders instructions below   Patient was getting workup for feeling hot sensation across the abdomen on sitting position and that has been going on for few years  Occasionally headaches  Some nights tingling and numbness in her feet  Decreased urinary stream at night and  Dyspnea on exertion  Also she has arthritis in her knees  She states she was under stress when her  was in the nursing home  History of COVID in early January 2022  Workup showed a small spike of 150 mg of IgG lambda monoclonal gammopathy  She was advised consultation with our group for MGUS  Looking back at her record she had MGUS in April 2021 and spike was 260 mg  History of thyroid cancer  History of  tremors in her and family members  History of AFib and pacemaker  History of spinal stenosis  Mylene Hong Physical examination and test results are as recorded and discussed  Patient's daughter Hamida Youssef was on the phone  We discussed MGUS and 1% per year risk of evolution into multiple myeloma  I would recommend monitoring of MGUS and that will be the goal   Also discussed importance of self-breast examination and health screening tests  Diet and activities per patient's primary physician  Healthy diet  Patient is capable of self-care  Discussed precautions against coronavirus      1  MGUS (monoclonal gammopathy of unknown significance)    - Ambulatory referral to Hematology / Oncology  - CBC and differential; Future  - Comprehensive metabolic panel; Future  - IgG, IgA, IgM; Future  - Immunoglobulin free LT chains blood; Future  - Protein electrophoresis, serum; Future  - Uric acid; Future  - Beta 2 microglobulin, serum; Future    2  Papillary microcarcinoma of thyroid ([facility] [address] )- Dr Walt Avelar      3  Malignant neoplasm of thyroid gland (HCC)      4  [facility] )      5  S/P placement of cardiac pacemaker      6  Spinal stenosis of lumbar region without neurogenic claudication      7  Tremors of nervous system      Blood work prior to next visit in 6 months     Patient will continue to follow with her primary physician and other consultants  Patient voiced understanding and agrees     This note has been generated by voice recognition softener  Therefore there maybe spelling, grammar, and/or syntax errors  Please contact if questions arise  Counseling / Coordination of Care    Gonzalo Mejias   Provided counseling and support

## 2022-02-11 RX ORDER — TRAMADOL HYDROCHLORIDE 50 MG/1
50 TABLET ORAL 2 TIMES DAILY PRN
Qty: 60 TABLET | Refills: 3 | Status: SHIPPED | OUTPATIENT
Start: 2022-02-11 | End: 2022-06-09 | Stop reason: SDUPTHER

## 2022-02-11 NOTE — TELEPHONE ENCOUNTER
01/10/2022  1  08/24/2021  TRAMADOL HCL 50 MG TABLET  60 0  30   MAR  [id number]  OPTUM (4943)  2  10 0 MME Medicare [place]

## 2022-02-15 DIAGNOSIS — I10 ESSENTIAL HYPERTENSION: ICD-10-CM

## 2022-02-15 RX ORDER — LOSARTAN POTASSIUM 50 MG/1
50 TABLET ORAL 2 TIMES DAILY
Qty: 180 TABLET | Refills: 1 | Status: SHIPPED | OUTPATIENT
Start: 2022-02-15 | End: 2022-05-26 | Stop reason: SDUPTHER

## 2022-02-16 DIAGNOSIS — I48.0 PAROXYSMAL ATRIAL FIBRILLATION (HCC): ICD-10-CM

## 2022-02-16 RX ORDER — AMIODARONE HYDROCHLORIDE 200 MG/1
200 TABLET ORAL DAILY
Qty: 30 TABLET | Refills: 11 | Status: SHIPPED | OUTPATIENT
Start: 2022-02-16 | End: 2022-02-23 | Stop reason: SDUPTHER

## 2022-02-17 ENCOUNTER — HOSPITAL ENCOUNTER (OUTPATIENT)
Dept: RADIOLOGY | Facility: MEDICAL CENTER | Age: 77
Discharge: HOME | End: 2022-02-17
Payer: MEDICARE

## 2022-02-17 VITALS — WEIGHT: 166 LBS | BODY MASS INDEX: 30.55 KG/M2 | HEIGHT: 62 IN

## 2022-02-17 DIAGNOSIS — Z13.820 SCREENING FOR OSTEOPOROSIS: ICD-10-CM

## 2022-02-17 DIAGNOSIS — M81.0 AGE-RELATED OSTEOPOROSIS WITHOUT CURRENT PATHOLOGICAL FRACTURE: ICD-10-CM

## 2022-02-17 DIAGNOSIS — Z12.31 ENCOUNTER FOR SCREENING MAMMOGRAM FOR BREAST CANCER: ICD-10-CM

## 2022-02-17 PROCEDURE — 77067 SCR MAMMO BI INCL CAD: CPT

## 2022-02-17 PROCEDURE — 77063 BREAST TOMOSYNTHESIS BI: CPT

## 2022-02-17 PROCEDURE — 77080 DXA BONE DENSITY AXIAL: CPT

## 2022-02-23 ENCOUNTER — TELEPHONE (OUTPATIENT)
Dept: CARDIOLOGY CLINIC | Facility: CLINIC | Age: 77
End: 2022-02-23

## 2022-02-23 DIAGNOSIS — I48.0 PAROXYSMAL ATRIAL FIBRILLATION (HCC): ICD-10-CM

## 2022-02-23 RX ORDER — AMIODARONE HYDROCHLORIDE 200 MG/1
200 TABLET ORAL DAILY
Qty: 90 TABLET | Refills: 2 | Status: SHIPPED | OUTPATIENT
Start: 2022-02-23

## 2022-02-24 ENCOUNTER — TELEPHONE (OUTPATIENT)
Dept: CARDIOLOGY CLINIC | Facility: CLINIC | Age: 77
End: 2022-02-24

## 2022-02-24 NOTE — TELEPHONE ENCOUNTER
P/C'd and states this am she is back in A-fib  Bp 155/109  HR 90  She will upload device report   Took medication at 7:30 am     Taking Amiodarone 200 mg qd              Toprol-xl 50 mg qd               Losartan 50 mg bid    Please advise

## 2022-02-24 NOTE — TELEPHONE ENCOUNTER
Take Toprol XL 25mg extra dose today  She has an appointment with Dr Nohelia Hernandez at the end of March  Call the office tomorrow to evaluate symptoms    Thank you

## 2022-02-25 NOTE — TELEPHONE ENCOUNTER
P/c'd and states she is feeling better today  She will call if things change  Does have F/u appt with Dr Marisel Benavidez on 3/23/22

## 2022-02-27 ENCOUNTER — NURSE TRIAGE (OUTPATIENT)
Dept: OTHER | Facility: OTHER | Age: 77
End: 2022-02-27

## 2022-02-27 DIAGNOSIS — I10 ESSENTIAL HYPERTENSION: Primary | ICD-10-CM

## 2022-02-27 RX ORDER — LOSARTAN POTASSIUM 50 MG/1
50 TABLET ORAL 2 TIMES DAILY
Qty: 14 TABLET | Refills: 0 | Status: SHIPPED | OUTPATIENT
Start: 2022-02-27 | End: 2022-03-01 | Stop reason: SDUPTHER

## 2022-02-27 NOTE — TELEPHONE ENCOUNTER
Regarding: Afib, weak  ----- Message from [facility] sent at 2/27/2022  8:22 AM EST -----  "I am in Afib and I do not feel good, I am very weak "

## 2022-02-27 NOTE — TELEPHONE ENCOUNTER
Spoke with Dr Romie Turner, he advised patient take an extra dose of Toprol XL 25mg today and tomorrow and to call the office tomorrow morning to schedule an appointment for this week  If symptoms worsen, she needs to go to ED  Patient made aware and verbalized understanding  Advised to call back with questions or concerns  Reason for Disposition   History of heart disease  (i e , heart attack, bypass surgery, angina, angioplasty, CHF) (Exception: brief heartbeat symptoms that went away and now feels well)    Answer Assessment - Initial Assessment Questions  1  DESCRIPTION: "Please describe your heart rate or heartbeat that you are having" (e g , fast/slow, regular/irregular, skipped or extra beats, "palpitations")      Palpations, fast heartbeat  2  ONSET: "When did it start?" (Minutes, hours or days)      Started three days ago  3  DURATION: "How long does it last" (e g , seconds, minutes, hours)     All the time  4  PATTERN "Does it come and go, or has it been constant since it started?"  "Does it get worse with exertion?"   "Are you feeling it now?"     Constant, gets worse with exertion  Right now sitting and feels palpitations  6  HEART RATE: "Can you tell me your heart rate?" "How many beats in 15 seconds?"  (Note: not all patients can do this)  76 with BP monitor    7  RECURRENT SYMPTOM: "Have you ever had this before?" If Yes, ask: "When was the last time?" and "What happened that time?"      Yes, was told to take a extra dose of Toprol XL 25mg, which helped for a day or two, but yesterday started again  8  CAUSE: "What do you think is causing the palpitations?"      AFib  9   CARDIAC HISTORY: "Do you have any history of heart disease?" (e g , heart attack, angina, bypass surgery, angioplasty, arrhythmia)       HX of Afib, CHF, HTN  10  OTHER SYMPTOMS: "Do you have any other symptoms?" (e g , dizziness, chest pain, sweating, difficulty breathing)     Felt lightheaded yesterday, not today yet, but feels weak  Yesterday was sweating  Denies chest pain and difficulty breathing  When walking feels weak then when sits feels heart racing  After sitting for a while HR starts to slow down  Patient has a pacemaker    /67 HR 76 this morning    On Thursday 2 24 called office and was told to take a extra dose of Toprol XL 25mg  This morning at 0730 took scheduled dose meds - losartan, Toprol XL, and Eliquis      Protocols used: HEART RATE AND HEARTBEAT QUESTIONS-ADULT-

## 2022-02-27 NOTE — TELEPHONE ENCOUNTER
Patient is asking for a short supply of Losartan 50mg tablet two times a day, as refill has not yet arrived from mail out pharmacy and she only has one dose left to take for this evening  Ordered sent to [address] on [facility] for Losartan 50mg two times a day by mouth for a 7 day supply only  Patient made aware and verbalized understanding  Advised to call back with questions or concerns  Reason for Disposition   [1] Caller requesting a prescription renewal (no refills left), no triage required, AND [2] triager able to renew prescription per department policy    Answer Assessment - Initial Assessment Questions  1  DRUG NAME: "What medicine do you need to have refilled?"      Losartan 50mg tablet two times a day   2  REFILLS REMAINING: "How many refills are remaining?" (Note: The label on the medicine or pill bottle will show how many refills are remaining  If there are no refills remaining, then a renewal may be needed )      Has refill coming from mail pharmacy, but has not yet arrived and only has one dose left for this evening  Would like 7 day supply sent to [address]  3  EXPIRATION DATE: "What is the expiration date?" (Note: The label states when the prescription will , and thus can no longer be refilled )     NA  4  PRESCRIBING HCP: "Who prescribed it?" Reason: If prescribed by specialist, call should be referred to that group        JENNY Johns    Protocols used: MEDICATION REFILL AND RENEWAL CALL-ADULT-

## 2022-02-28 NOTE — TELEPHONE ENCOUNTER
P/c'd , states she was having problems over the weekend with A-fib  She tried to get sooner appt with Dr Catrina Claude  She will come to see [facility] tomorrow

## 2022-03-01 ENCOUNTER — OFFICE VISIT (OUTPATIENT)
Dept: CARDIOLOGY CLINIC | Facility: CLINIC | Age: 77
End: 2022-03-01
Payer: MEDICARE

## 2022-03-01 VITALS
HEIGHT: 62 IN | WEIGHT: 163.5 LBS | DIASTOLIC BLOOD PRESSURE: 78 MMHG | HEART RATE: 93 BPM | BODY MASS INDEX: 30.09 KG/M2 | SYSTOLIC BLOOD PRESSURE: 120 MMHG | OXYGEN SATURATION: 99 %

## 2022-03-01 DIAGNOSIS — I48.91 ATRIAL FIBRILLATION, UNSPECIFIED TYPE (HCC): Primary | ICD-10-CM

## 2022-03-01 DIAGNOSIS — Z95.0 S/P PLACEMENT OF CARDIAC PACEMAKER: ICD-10-CM

## 2022-03-01 DIAGNOSIS — I10 HYPERTENSION, UNSPECIFIED TYPE: ICD-10-CM

## 2022-03-01 PROCEDURE — 93000 ELECTROCARDIOGRAM COMPLETE: CPT | Performed by: NURSE PRACTITIONER

## 2022-03-01 PROCEDURE — 99215 OFFICE O/P EST HI 40 MIN: CPT | Performed by: NURSE PRACTITIONER

## 2022-03-01 RX ORDER — METOPROLOL SUCCINATE 50 MG/1
TABLET, EXTENDED RELEASE ORAL
Qty: 90 TABLET | Refills: 3
Start: 2022-03-01 | End: 2022-04-01 | Stop reason: DRUGHIGH

## 2022-03-01 NOTE — PROGRESS NOTES
Cardiology Follow Up    Luh Blair  1945  [id number]  [facility] OLLIE Jennings [address] [facility]  [phone]    1  Atrial fibrillation, unspecified type ([address] )  POCT ECG    TSH, 3rd generation with Free T4 reflex   2  S/P placement of cardiac pacemaker     3  Hypertension, unspecified type  metoprolol succinate (TOPROL-XL) 50 mg 24 hr tablet       Interval History:   On 12/27/21 ms Isabella Reilly called our office with complains of heart fluttering on and off since the day prior  HR higher with exertion  It was suggested she be placed back on Amiodarone 200mg daily  DCCV could be considered if symptoms did not improved  On 1/03/22 ms Isabella Reilly presented to our office with complaints of a 5 day history of  severe fatigue, shortness of breath with walking and going up stairs  She denies CP, lightheadedness or dizziness  EKG showed atrial fibrillation ventricular response of 94 BPM   She was scheduled for DCCV  On 1/06/22 Ms Isabella Reilly underwent DCCV single biphasic shock 200J to NSR  On 2/24/22 She called the office with complaints of returning back into atrial fibrillation  Instructed to take extra dose of Metoprolol XL 25mg  Device interrogation showed Atrial fibrillation at 8:21 am, AF burden 0 2%, AP 62%        On 2/28/22 Ms Isabella Reilly called our office with complaints of problems over the weekend with atrial fibrillation  She was scheduled for a follow up office appointment  Ms Geno Bellamy presents to our office with complaints of fatigue and shortness of breath since last Thursday  On 2/28/22 Alden Jennings took and extra Metoprolol and felt better later in the afternoon  She has been very fatigued with activity intolerance and shortness of breath  Alden Jennings admits to a small amount of swelling in her ankles at the end of the day  She is being careful in regards to eating low sodium  HPI:  COVID-19+ 1/07/22   Paroxysmal atrial fibrillation [id number], on Eliquis 5mg BID for stroke prevention,  3/12/21 sp cryoablation with pulmonary vein isolation, DCCV from AF to NSR  Placed on Flecainide post procedure   SSS,  3/15/21 Sp MDT dual chamber PPM insertion ,   Hypertension  Chronic HFpEF LVEF 55% by TTE 1/04/21   Mild to mod TR per TTE 1/04/21   Hyperlipidemia 4/01/21 , , HDL 66, LDL 88   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    sp Lobectomy  10/27/21 TSH 0 975  2016 Sleep study negative for sleep apnea   Colonoscopy 8/2021   Allergies: Sotalol- prolonged QT leading to  torsades de pointes   Patient Active Problem List   Diagnosis    Essential hypertension    Allergic rhinitis    Fibromyalgia    Hyperlipidemia    Insomnia    Lumbar spondylosis    Lumbar stenosis    Osteoarthrosis, hand    Osteoarthritis of knee    Osteopenia    Paroxysmal atrial fibrillation (HCC)    Peripheral neuropathy    Restless legs syndrome    TMJ syndrome    Vitamin D deficiency    Osteoarthritis of shoulder    Carpal tunnel syndrome on right    Arthritis of both acromioclavicular joints    Chronic rhinitis    Chronic diastolic CHF (congestive heart failure) (HCC)    Malignant neoplasm of thyroid gland (HCC)    Current use of long term anticoagulation    S/P placement of cardiac pacemaker    Tremors of nervous system    Hx of diplopia    Hurthle cell adenoma of thyroid    MGUS (monoclonal gammopathy of unknown significance)    Vasomotor rhinitis     Past Medical History:   Diagnosis Date    Actinic keratosis     last assessed - [id number]    Arthritis     Atrial fibrillation with rapid ventricular response (HCC)     last assessed - 26Apr2016    Basal cell carcinoma     Benign colon polyp     last assessed - 27Apr2015    Disease of thyroid gland     Effusion of knee joint right     Resolved - 19Apr2016    Esophageal reflux     Fibromyalgia last assessed - [id number]    Fibromyalgia, primary     GERD (gastroesophageal reflux disease)     Hypertension     Palpitations     last assessed - [id number]    Peroneal tendonitis, right     last assessed - [id number]    Right lumbar radiculopathy 3/17/2016    Thyroid cancer ([facility] [address]     Thyroid nodule     Trochanteric bursitis of left hip 3/9/2018     Social History     Socioeconomic History    Marital status:       Spouse name: Not on file    Number of children: Not on file    Years of education: Not on file    Highest education level: Not on file   Occupational History    Not on file   Tobacco Use    Smoking status: Never Smoker    Smokeless tobacco: Never Used   Vaping Use    Vaping Use: Never used   Substance and Sexual Activity    Alcohol use: Not Currently     Comment: occosional - every 3 months    Drug use: Never    Sexual activity: Not Currently   Other Topics Concern    Not on file   Social History Narrative    Not on file     Social Determinants of Health     Financial Resource Strain: Not on file   Food Insecurity: Not on file   Transportation Needs: Not on file   Physical Activity: Not on file   Stress: Not on file   Social Connections: Not on file   Intimate Partner Violence: Not on file   Housing Stability: Not on file      Family History   Problem Relation Age of Onset    Heart disease Mother     Diabetes Mother     Heart disease Father     Coronary artery disease Father     Stroke Father         cerebrovascular accident    Heart attack Father         myocardial infarction    Sudden death Father         scd    Other Family         Back disorder    Coronary artery disease Family     Neuropathy Family     Osteoporosis Family     No Known Problems Daughter     No Known Problems Maternal Grandmother     No Known Problems Maternal Grandfather     No Known Problems Paternal Grandmother     No Known Problems Paternal Grandfather     Cancer Maternal Uncle     Breast cancer Maternal Aunt 72    No Known Problems Son     No Known Problems Maternal Aunt     No Known Problems Maternal Aunt     No Known Problems Maternal Aunt     No Known Problems Paternal Aunt     No Known Problems Paternal Aunt     Anuerysm Neg Hx     Clotting disorder Neg Hx     Arrhythmia Neg Hx     Heart failure Neg Hx      Past Surgical History:   Procedure Laterality Date    CATARACT EXTRACTION Bilateral     COLONOSCOPY  03/2018    EYE SURGERY      HYSTERECTOMY      JOINT REPLACEMENT Left     knee    KS REVISE MEDIAN N/CARPAL TUNNEL SURG Right 11/14/2019    Procedure: RELEASE CARPAL TUNNEL;  Surgeon: Brandie Leong MD;  [facility];  Service: Orthopedics    KS THYROID LOBECTOMY,UNILAT Left 12/16/2020    Procedure: Left THYROID LOBECTOMY;  Surgeon: Lola Zacarias MD;  [facility];  Service: ENT    RECTAL POLYPECTOMY      REPLACEMENT TOTAL KNEE Left     last assessed - 27Apr2015    TONSILLECTOMY      TOTAL ABDOMINAL HYSTERECTOMY      TUBAL LIGATION      US GUIDED THYROID BIOPSY  10/14/2020       Current Outpatient Medications:     acetaminophen (TYLENOL) 650 mg CR tablet, Take 1 tablet (650 mg total) by mouth every 6 (six) hours as needed for mild pain or moderate pain (Patient taking differently: Take 300 mg by mouth 2 (two) times a day  ), Disp: 30 tablet, Rfl: 0    amiodarone 200 mg tablet, Take 1 tablet (200 mg total) by mouth daily, Disp: 90 tablet, Rfl: 2    apixaban (ELIQUIS) 5 mg, Take 1 tablet (5 mg total) by mouth 2 (two) times a day, Disp: 56 tablet, Rfl: 0    ascorbic acid (VITAMIN C) 500 mg tablet, Take 500 mg by mouth daily , Disp: , Rfl:     Cholecalciferol (CVS VITAMIN D) 2000 UNITS CAPS, Take 2,000 Units by mouth 2 (two) times a day  , Disp: , Rfl:     Chromium Picolinate (CHROMIUM PICOLATE PO), Take 1 tablet by mouth daily, Disp: , Rfl:     DULoxetine (CYMBALTA) 30 mg delayed release capsule, take 1 capsule by mouth daily every morning take with food, Disp: 30 capsule, Rfl: 2    ezetimibe (ZETIA) 10 mg tablet, TAKE 1 TABLET BY MOUTH  DAILY, Disp: 90 tablet, Rfl: 3    famotidine (PEPCID) 20 mg tablet, Take 20 mg by mouth daily  , Disp: , Rfl:     gabapentin (NEURONTIN) 300 mg capsule, TAKE 2 CAPSULES BY MOUTH AT BEDTIME, Disp: 180 capsule, Rfl: 4    ipratropium (ATROVENT) 0 03 % nasal spray, 2 sprays into each nostril 3 (three) times a day Begin once per day, then progress to twice per day  Progress to three times a day as tolerated  , Disp: 90 mL, Rfl: 3    lidocaine (XYLOCAINE) 5 % ointment, Apply topically as needed for mild pain, Disp: 35 44 g, Rfl: 0    losartan (COZAAR) 50 mg tablet, Take 1 tablet (50 mg total) by mouth 2 (two) times a day, Disp: 180 tablet, Rfl: 1    losartan (COZAAR) 50 mg tablet, Take 1 tablet (50 mg total) by mouth 2 (two) times a day for 7 days, Disp: 14 tablet, Rfl: 0    metoprolol succinate (TOPROL-XL) 50 mg 24 hr tablet, Take 1 tablet (50 mg total) by mouth daily, Disp: 90 tablet, Rfl: 3    rOPINIRole (REQUIP) 0 25 mg tablet, TAKE 1 TABLET BY MOUTH  DAILY AT BEDTIME, Disp: 90 tablet, Rfl: 3    traMADol (ULTRAM) 50 mg tablet, Take 1 tablet (50 mg total) by mouth 2 (two) times a day as needed for moderate pain, Disp: 60 tablet, Rfl: 3    TURMERIC PO, Take 1 capsule by mouth 2 (two) times a day, Disp: , Rfl:     zolpidem (AMBIEN) 10 mg tablet, TAKE 1 TABLET BY MOUTH  DAILY AT BEDTIME AS NEEDED  FOR SLEEP, Disp: 90 tablet, Rfl: 1  Allergies   Allergen Reactions    Sotalol Other (See Comments)     Prolonged QTc leading to torsades de pointes     Penicillins Other (See Comments)     As a child calcium deposit in the arm     Ace Inhibitors GI Intolerance     Did feel well on it       Labs:  Orders Only on 01/07/2022   Component Date Value    SARS-CoV-2 01/07/2022 [facility] Outpatient Visit on 01/06/2022   Component Date Value    Ventricular Rate 01/06/2022 121     QRSD Interval 01/06/2022 108     QT Interval 01/06/2022 352     QTC Interval 01/06/2022 499     QRS Axis 01/06/2022 32     T Wave Axis 01/06/2022 -17     Ventricular Rate 01/06/2022 64     Atrial Rate 01/06/2022 64     IL Interval 01/06/2022 200     QRSD Interval 01/06/2022 98     QT Interval 01/06/2022 456     QTC Interval 01/06/2022 470     P Axis 01/06/2022 84     QRS Axis 01/06/2022 33     T Wave [place] 01/06/2022 18      Imaging: DXA bone density spine hip and pelvis    Result Date: 2/17/2022  Narrative: CENTRAL DXA SCAN CLINICAL HISTORY:  79-year-old postmenopausal  female with no significant past medical history  OTHER RISK FACTORS:  Cymbalta use  PHARMACOLOGIC THERAPY FOR OSTEOPOROSIS:  None currently  TECHNIQUE: Bone densitometry was performed using a Hologic Horizon A  bone densitometer  Regions of interest appear properly placed  COMPARISON: September 14, 2018  RESULTS: LUMBAR SPINE L1-L4 : BMD  0 899  gm/cm2 T-score -1 3  These values are artifactually elevated due to the presence of scoliosis with spondylosis  LEFT  TOTAL HIP: BMD:  0 807  gm/cm2 T-score:  -1 1 LEFT  FEMORAL NECK: BMD:  0 629  gm/cm2 T score: -2 0     Impression: 1  Low bone mass (OSTEOPENIA)  [Based on the left femoral neck] 2  When compared to the prior examination, there has been NO SIGNIFICANT CHANGE in the total bone mineral density of the lumbar spine, when allowing for the least significant change  There has however been a 4% DECREASE in the total bone mineral density of the left hip  3   The 10 year risk of hip fracture is 4 with the 10 year risk of major osteoporotic fracture being 14 as calculated by the [facility]/WHO fracture risk assessment tool (FRAX)    4   The current NOF guidelines recommend treating patients with a T-score of -2 5 or less in the lumbar spine or hips, or in post-menopausal women and men over the age of 48 with low bone mass (osteopenia) and a FRAX 10 year risk score of >3% for hip fracture and/or >20% for major osteoporotic fracture  5   The NOF recommends follow-up DXA in 1-2 years after initiating therapy for osteoporosis and every 2 years thereafter  More frequent evaluation is appropriate for patients with conditions associated with rapid bone loss, such as glucocorticoid therapy  The interval between DXA screenings may be longer for individuals without major risk factors and initial T-score in the normal or upper low bone mass range  The FRAX algorithm has certain limitations: -FRAX has not been validated in patients currently or previously treated with pharmacotherapy for osteoporosis  In such patients, clinical judgment must be exercised in interpreting FRAX scores  -Prior hip, vertebral and humeral fragility fractures appear to confer greater risk of subsequent fracture than fractures at other sites (this is especially true for individuals with severe vertebral fractures), but quantification of this incremental risk is not possible with FRAX  -FRAX underestimates fracture risk in patients with history of multiple fragility fractures  -FRAX may underestimate fracture risk in patients with history of frequent falls  -It is not appropriate to use FRAX to monitor treatment response  WHO CLASSIFICATION: Normal (a T-score of -1 0 or higher) Low bone mineral density (a T-score of less than -1 0 but higher than -2 5) Osteoporosis (a T-score of -2 5 or less) Severe osteoporosis (a T-score of -2 5 or less with a fragility fracture) LEAST SIGNIFICANT CHANGE (AT 95% C  I): Lumbar spine: 0 022; 2 2% Total hip: 0 017 g/cm2; 1 9% Forearm: 0 032 g/cm2; 5 0% Workstation performed: [id number]     Mammo screening bilateral w 3d & cad    Result Date: 2/18/2022  Narrative: DIAGNOSIS: Encounter for screening mammogram for breast cancer TECHNIQUE: Digital screening mammography was performed  Computer Aided Detection (CAD) analyzed all applicable images   COMPARISONS: Prior breast imaging dated: 10/04/2019, 09/14/2018, 05/01/2017, 04/08/2016, 02/11/2015, and 01/02/2014 RELEVANT HISTORY: Family Breast Cancer History: History of breast cancer in Maternal Aunt  Family Medical History: Family medical history includes breast cancer in maternal aunt  Personal History: No known relevant hormone history  Surgical history includes hysterectomy  No known relevant medical history  The patient is scheduled in a reminder system for screening mammography  8-10% of cancers will be missed on mammography  Management of a palpable abnormality must be based on clinical grounds  Patients will be notified of their results via letter from our facility  Accredited by [employer] of Radiology and FDA  RISK ASSESSMENT: 5 Year Tyrer-Cuzick: 1 68 % 10 Year Tyrer-Cuzick: No Score Lifetime Tyrer-Cuzick: 2 77 % TISSUE DENSITY: There are scattered areas of fibroglandular density  INDICATION: Ana Grant is a 67 y o  female presenting for screening mammography  FINDINGS: There are no suspicious masses, grouped microcalcifications or areas of architectural distortion  The skin and nipple areolar complex are unremarkable  Impression: No mammographic evidence of malignancy  ASSESSMENT/BI-RADS CATEGORY: Left: 1 - Negative Right: 1 - Negative Overall: 1 - Negative RECOMMENDATION:      - Routine screening mammogram in 1 year for both breasts  Workstation ID: [id number]     Cardiac EP device report    Result Date: 2/24/2022  Narrative: MDT-DUAL CHAMBER PPM (AAIR-DDDR MODE)/ ACTIVE SYSTEM IS MRI CONDITIONAL CARELINK TRANSMISSION- NB: PT C/O WAKING UP W/ PALPITATIONS  AF/VS~110 BPM ON CURRENT EGM  3 AF EPISODES TREATED UNSUCCESSFULLY W/ REACTIVE ATP & 1 MONITORED AF EPISODE  PT IN AF SINCE 08:21 TODAY  AF BURDEN-0 2%  PT ON ELIQUIS, AMIO & METOPROLOL  PER CC, CRNP, INSTRUCTED PT TO TAKE AN ADDITIONAL TOPROL XL 25 MG NOW & TO CALL TO UPDATE TOMORROW OR IF WORSENS, THIS AFTERNOON  PT VERBALIZED UNDERSTANDING  BATTERY VOLTAGE ADEQUATE (13 YRS)  AP-62%, <0 1%   ALL AVAILABLE LEAD PARAMETERS WITHIN NORMAL LIMITS  NORMAL DEVICE FUNCTION  GV       Review of Systems:  Review of Systems   Constitutional: Positive for fatigue  Respiratory: Positive for shortness of breath  Musculoskeletal: Positive for arthralgias and myalgias  All other systems reviewed and are negative  Physical Exam:  Physical Exam  Vitals reviewed  Constitutional:       Appearance: Normal appearance  HENT:      Head: Normocephalic  Eyes:      Pupils: Pupils are equal, round, and reactive to light  Cardiovascular:      Rate and Rhythm: Normal rate  Rhythm irregular  Pulses: Normal pulses  Heart sounds: Normal heart sounds  Pulmonary:      Effort: Pulmonary effort is normal       Breath sounds: Normal breath sounds  Abdominal:      General: Bowel sounds are normal       Palpations: Abdomen is soft  Musculoskeletal:         General: Normal range of motion  Cervical back: Normal range of motion and neck supple  Right lower leg: No edema  Left lower leg: No edema  Skin:     General: Skin is warm and dry  Capillary Refill: Capillary refill takes less than 2 seconds  Neurological:      General: No focal deficit present  Mental Status: She is alert and oriented to person, place, and time  Psychiatric:         Mood and Affect: Mood normal          Behavior: Behavior normal          Discussion/Summary:  1  Atrial fibrillation [id number]=1 ( age, female, HTN, CHF), on Eliquis 5mg BID for stroke prevention, 3/12/21 sp cryoablation with pulmonary vein isolation, DCCV to NSR at this time  Sp DCCV for symptomatic atrial fibrillation on 1/06/22, 2/24/22 asymptomatic atrial fibrillation  Continues with symptomatic atrial fibrillaiton with loosely controlled rate 93 BPM with palpitations  Continues on Amiodarone 200mg daily, increase Metoprolol succinate from 50mg daily to 75mg daily for rate control  I have discussed further treatment plans with Dr Jeny Finley  Plan DCCV for symptomatic atrial fibrillation  Ms Marston Schlatter is in agreement  Follow up with Dr Jonathan Campbell on 3/23/22  Most likely will need ablation in near future  10/27/21 TSH 0 975, TSH in near future  2  SSS,  3/15/21 Sp MDT dual chamber PPM insertion , 2/24/22 device interrogation showed Atrial fibrillation at 8:21 am, AF burden 0 2%, AP 62%      3   Hypertension RUE sitting 128/72 controlled, plan to increase Metoprolol succinate from 50mg daily to 75mg daily, continue on Losartan 50mg BID   2gms odium diet

## 2022-03-02 ENCOUNTER — TELEPHONE (OUTPATIENT)
Dept: CARDIOLOGY CLINIC | Facility: HOSPITAL | Age: 77
End: 2022-03-02

## 2022-03-02 NOTE — TELEPHONE ENCOUNTER
----- Message from Daron Baez sent at 3/1/2022 12:47 PM EST -----  Regarding: lab  Please call Setphanie Dixon and tell her to undergo TSH in the near future  Thank you     Spoke with pt re: lab wrk TSH to be done in near future   Pt will comply

## 2022-03-03 ENCOUNTER — APPOINTMENT (OUTPATIENT)
Dept: LAB | Facility: CLINIC | Age: 77
End: 2022-03-03
Payer: MEDICARE

## 2022-03-03 LAB — TSH SERPL DL<=0.05 MIU/L-ACNC: 0.66 UIU/ML (ref 0.36–3.74)

## 2022-03-03 PROCEDURE — 36415 COLL VENOUS BLD VENIPUNCTURE: CPT | Performed by: NURSE PRACTITIONER

## 2022-03-03 PROCEDURE — 84443 ASSAY THYROID STIM HORMONE: CPT | Performed by: NURSE PRACTITIONER

## 2022-03-07 ENCOUNTER — TELEPHONE (OUTPATIENT)
Dept: CARDIOLOGY CLINIC | Facility: CLINIC | Age: 77
End: 2022-03-07

## 2022-03-07 NOTE — TELEPHONE ENCOUNTER
----- Message from Michele Hardin [place] sent at 3/7/2022  9:21 AM EST -----  Please call Carolyne Phelps and inform her TSH WNL  Thank you     Spoke with pt re: normal TSH rslts

## 2022-03-08 ENCOUNTER — TELEPHONE (OUTPATIENT)
Dept: CARDIOLOGY CLINIC | Facility: CLINIC | Age: 77
End: 2022-03-08

## 2022-03-08 NOTE — TELEPHONE ENCOUNTER
Richar Roche called re cardioversion / A fib  Scheduled for cardioversion on 3/17  She has been bothered by A Fib symptoms, Gillespie Sheeba she feels her heart racing, very tired, and has occasional chest tightness  No sooner availability at the [facility]  Spoke with Arias Degroot in Cath lab who will look at [facility] availability

## 2022-03-10 ENCOUNTER — TELEPHONE (OUTPATIENT)
Dept: CARDIOLOGY CLINIC | Facility: CLINIC | Age: 77
End: 2022-03-10

## 2022-03-10 NOTE — TELEPHONE ENCOUNTER
P/c'd is having cardioversion tomorrow  Does she need to hold any medication prior to procedure?       Please advise

## 2022-03-11 ENCOUNTER — ANESTHESIA (OUTPATIENT)
Dept: NON INVASIVE DIAGNOSTICS | Facility: HOSPITAL | Age: 77
End: 2022-03-11

## 2022-03-11 ENCOUNTER — ANESTHESIA EVENT (OUTPATIENT)
Dept: NON INVASIVE DIAGNOSTICS | Facility: HOSPITAL | Age: 77
End: 2022-03-11

## 2022-03-11 ENCOUNTER — HOSPITAL ENCOUNTER (OUTPATIENT)
Dept: NON INVASIVE DIAGNOSTICS | Facility: HOSPITAL | Age: 77
Discharge: HOME | End: 2022-03-11
Admitting: INTERNAL MEDICINE
Payer: MEDICARE

## 2022-03-11 VITALS
RESPIRATION RATE: 17 BRPM | SYSTOLIC BLOOD PRESSURE: 146 MMHG | DIASTOLIC BLOOD PRESSURE: 72 MMHG | TEMPERATURE: 97.6 F | OXYGEN SATURATION: 98 % | HEART RATE: 60 BPM

## 2022-03-11 DIAGNOSIS — I48.91 ATRIAL FIBRILLATION, UNSPECIFIED TYPE (HCC): ICD-10-CM

## 2022-03-11 PROCEDURE — 92960 CARDIOVERSION ELECTRIC EXT: CPT

## 2022-03-11 PROCEDURE — 93005 ELECTROCARDIOGRAM TRACING: CPT

## 2022-03-11 PROCEDURE — 92960 CARDIOVERSION ELECTRIC EXT: CPT | Performed by: INTERNAL MEDICINE

## 2022-03-11 RX ORDER — PROPOFOL 10 MG/ML
INJECTION, EMULSION INTRAVENOUS AS NEEDED
Status: DISCONTINUED | OUTPATIENT
Start: 2022-03-11 | End: 2022-03-11

## 2022-03-11 RX ORDER — SODIUM CHLORIDE 9 MG/ML
INJECTION, SOLUTION INTRAVENOUS CONTINUOUS PRN
Status: DISCONTINUED | OUTPATIENT
Start: 2022-03-11 | End: 2022-03-11

## 2022-03-11 RX ADMIN — SODIUM CHLORIDE: 0.9 INJECTION, SOLUTION INTRAVENOUS at 11:26

## 2022-03-11 RX ADMIN — PROPOFOL 100 MG: 10 INJECTION, EMULSION INTRAVENOUS at 11:57

## 2022-03-11 RX ADMIN — SODIUM CHLORIDE: 0.9 INJECTION, SOLUTION INTRAVENOUS at 11:42

## 2022-03-11 NOTE — ANESTHESIA POSTPROCEDURE EVALUATION
Post-Op Assessment Note    CV Status:  Stable  Pain Score: 0    Pain management: adequate     Mental Status:  Alert and awake   Hydration Status:  Euvolemic and stable   PONV Controlled:  None   Airway Patency:  Patent      Post Op Vitals Reviewed: Yes      Staff: CRNA         No complications documented      /63 (03/11/22 1212)    Temp 97 6 °F (36 4 °C) (03/11/22 1212)    Pulse 59 (03/11/22 1212)   Resp 18 (03/11/22 1212)    SpO2 97 % (03/11/22 1212)

## 2022-03-11 NOTE — ANESTHESIA PREPROCEDURE EVALUATION
EF55%, mild pHTN, mod TR    Procedure:  CARDIOVERSION    Relevant Problems   ANESTHESIA (within normal limits)      CARDIO   (+) Essential hypertension   (+) Hyperlipidemia   (+) Paroxysmal atrial fibrillation (HCC)      MUSCULOSKELETAL   (+) Arthritis of both acromioclavicular joints   (+) Fibromyalgia   (+) Lumbar spondylosis   (+) Osteoarthritis of knee   (+) Osteoarthritis of shoulder   (+) Osteoarthrosis, hand      NEURO/PSYCH   (+) Fibromyalgia   (+) Hx of diplopia   (+) S/P placement of cardiac pacemaker        Physical Exam    Airway    Mallampati score: II  TM Distance: >3 FB  Neck ROM: full     Dental   upper dentures and lower dentures,     Cardiovascular  Rate: normal,     Pulmonary  Pulmonary exam normal     Other Findings        Anesthesia Plan  ASA Score- 3     Anesthesia Type- IV sedation with anesthesia with ASA Monitors  Additional Monitors:   Airway Plan:     Comment: Per patient, appropriately NPO, denies active CP/SOB/wheezing/symptoms related to heartburn/nausea/vomiting  Plan Factors-Exercise tolerance (METS): >4 METS  Chart reviewed  Patient summary reviewed  Patient is not a current smoker  Induction- intravenous  Postoperative Plan- Plan for postoperative opioid use  Informed Consent- Anesthetic plan and risks discussed with patient  I personally reviewed this patient with the CRNA  Discussed and agreed on the Anesthesia Plan with the CRNA  Dominik Veloz

## 2022-03-12 LAB
ATRIAL RATE: 106 BPM
ATRIAL RATE: 62 BPM
P AXIS: 169 DEGREES
P AXIS: 93 DEGREES
PR INTERVAL: 0 MS
PR INTERVAL: 192 MS
QRS AXIS: 34 DEGREES
QRS AXIS: 36 DEGREES
QRSD INTERVAL: 108 MS
QRSD INTERVAL: 92 MS
QT INTERVAL: 372 MS
QT INTERVAL: 484 MS
QTC INTERVAL: 485 MS
QTC INTERVAL: 491 MS
T WAVE AXIS: -1 DEGREES
T WAVE AXIS: -2 DEGREES
VENTRICULAR RATE: 102 BPM
VENTRICULAR RATE: 62 BPM

## 2022-03-12 PROCEDURE — 93010 ELECTROCARDIOGRAM REPORT: CPT | Performed by: INTERNAL MEDICINE

## 2022-03-14 ENCOUNTER — REMOTE DEVICE CLINIC VISIT (OUTPATIENT)
Dept: CARDIOLOGY CLINIC | Facility: CLINIC | Age: 77
End: 2022-03-14
Payer: MEDICARE

## 2022-03-14 DIAGNOSIS — Z95.0 PACEMAKER: Primary | ICD-10-CM

## 2022-03-14 PROCEDURE — 93294 REM INTERROG EVL PM/LDLS PM: CPT | Performed by: INTERNAL MEDICINE

## 2022-03-14 PROCEDURE — 93296 REM INTERROG EVL PM/IDS: CPT | Performed by: INTERNAL MEDICINE

## 2022-03-14 NOTE — PROGRESS NOTES
Results for orders placed or performed in visit on 03/14/22   Cardiac EP device report    Narrative    MDT-DUAL CHAMBER PPM (AAIR-DDDR MODE)/ ACTIVE SYSTEM IS MRI CONDITIONAL  CARELINK TRANSMISSION: BATTERY VOLTAGE ADEQUATE (13 YRS)  AP 81 4%  <0 1% (AAIR-DDDR 60); ALL AVAILABLE LEAD PARAMETERS WITHIN NORMAL LIMITS  1 AT/AF EPISODE TREATED WITH UNSUCCESSFULLY WITH rATP (0/1-0%)  S/P DC CV ON 3/11/22 & PATIENT TAKES ELIQUIS, AMIODORONE, METOPROLOL SUCC  NORMAL DEVICE FUNCTION     ES

## 2022-03-23 ENCOUNTER — OFFICE VISIT (OUTPATIENT)
Dept: CARDIOLOGY CLINIC | Facility: CLINIC | Age: 77
End: 2022-03-23
Payer: MEDICARE

## 2022-03-23 VITALS
DIASTOLIC BLOOD PRESSURE: 80 MMHG | HEART RATE: 63 BPM | HEIGHT: 62 IN | WEIGHT: 166.7 LBS | SYSTOLIC BLOOD PRESSURE: 178 MMHG | BODY MASS INDEX: 30.67 KG/M2

## 2022-03-23 DIAGNOSIS — C73 MALIGNANT NEOPLASM OF THYROID GLAND (HCC): ICD-10-CM

## 2022-03-23 DIAGNOSIS — I48.0 PAROXYSMAL ATRIAL FIBRILLATION (HCC): ICD-10-CM

## 2022-03-23 DIAGNOSIS — D47.2 MGUS (MONOCLONAL GAMMOPATHY OF UNKNOWN SIGNIFICANCE): ICD-10-CM

## 2022-03-23 DIAGNOSIS — E78.2 MIXED HYPERLIPIDEMIA: ICD-10-CM

## 2022-03-23 DIAGNOSIS — Z79.899 LONG TERM CURRENT USE OF AMIODARONE: ICD-10-CM

## 2022-03-23 DIAGNOSIS — Z95.0 S/P PLACEMENT OF CARDIAC PACEMAKER: ICD-10-CM

## 2022-03-23 DIAGNOSIS — I48.91 ATRIAL FIBRILLATION, UNSPECIFIED TYPE (HCC): Primary | ICD-10-CM

## 2022-03-23 DIAGNOSIS — I50.32 CHRONIC DIASTOLIC CHF (CONGESTIVE HEART FAILURE) (HCC): ICD-10-CM

## 2022-03-23 DIAGNOSIS — I10 ESSENTIAL HYPERTENSION: ICD-10-CM

## 2022-03-23 DIAGNOSIS — Z79.01 CURRENT USE OF LONG TERM ANTICOAGULATION: ICD-10-CM

## 2022-03-23 PROCEDURE — 93000 ELECTROCARDIOGRAM COMPLETE: CPT | Performed by: INTERNAL MEDICINE

## 2022-03-23 PROCEDURE — 99214 OFFICE O/P EST MOD 30 MIN: CPT | Performed by: INTERNAL MEDICINE

## 2022-03-23 NOTE — PROGRESS NOTES
Cardiology Follow Up    Dalia Wolf  1945  [id number]  [facility] [place] [id number] [facility]  [phone]    1  Atrial fibrillation, unspecified type ([place] [address] )  POCT ECG    Comprehensive metabolic panel    Spirometry with diffusing capacity    TSH, 3rd generation with Free T4 reflex   2  Long term current use of amiodarone  Comprehensive metabolic panel    Spirometry with diffusing capacity    TSH, 3rd generation with Free T4 reflex   3  Malignant neoplasm of thyroid gland ([place] [address] )     4  Essential hypertension     5  Paroxysmal atrial fibrillation (HCC)     6  Chronic diastolic CHF (congestive heart failure) ([place] [address] )     7  Mixed hyperlipidemia     8  Current use of long term anticoagulation     9  S/P placement of cardiac pacemaker     10   MGUS (monoclonal gammopathy of unknown significance)         Interval History:   The patient underwent comprehensive ablation for paroxysmal atrial fibrillation  During the procedure she went into ventricular fibrillation-she was bradycardic and on sotalol  Post ablation procedures she also underwent pacemaker placement    Patient was doing well for sometime  However she has recurrence of atrial fibrillation with RVR -She is symptomatic with the palpitation  Prior to ablation she was having recurrent episodes of AFib with RVR resulting in diastolic heart failure    Patient has been started on amiodarone and cardioverted to normal rhythm  This has led to good control of symptoms    Recently she had a mechanical fall while gardening  There are areas of ecchymosis and bruising over the face      She is not complaining of anginal like chest pain  She is not complaining of worsening orthopnea or PND  She is not complaining of worsening leg swelling    She does feel the palpitation and it is uncomfortable     Patient is a never smoker   She does not abuse alcohol  She does not use recreational drugs   She does not use energy drinks     She does not have a history of snoring   She does not have history of morning fatigue or daytime sleepiness     There is a family history of diabetes, coronary artery disease, stroke      prior medical history  The patient is a pleasant 58-year-old lady, with a history of A fib    This has been present for at least 6 years, but there has been a recent increase in frequency and duration  Was started on sotalol 80 bid about 3 years ago     Predominant symptom is palpitation and occasional shortness of breath and chest pain  There has been no episode of presyncope or syncope  She does not complain of orthopnea or paroxysmal nocturnal dyspnea  Patient does not have any leg swelling     she does have a history of snoring, morning fatigability and daytime sleepiness  However sleep study was negative for significant AKIN     The patient does have a history of knee replacement and has significant arthritis of her other knee     She takes care of her  and cannot stay too long in hospital      BP (!) 178/80 (BP Location: Right arm, Patient Position: Sitting, Cuff Size: Adult)   Pulse 63   Ht 5' 2" (1 575 m)   Wt 75 6 kg (166 lb 11 2 oz)   LMP 02/01/1990 (Within Weeks)   BMI 30 49 kg/m²       Patient Active Problem List   Diagnosis    Essential hypertension    Allergic rhinitis    Fibromyalgia    Hyperlipidemia    Insomnia    Lumbar spondylosis    Lumbar stenosis    Osteoarthrosis, hand    Osteoarthritis of knee    Osteopenia    Paroxysmal atrial fibrillation (HCC)    Peripheral neuropathy    Restless legs syndrome    TMJ syndrome    Vitamin D deficiency    Osteoarthritis of shoulder    Carpal tunnel syndrome on right    Arthritis of both acromioclavicular joints    Chronic rhinitis    Chronic diastolic CHF (congestive heart failure) ([address]    Malignant neoplasm of thyroid gland ([address] )    Current use of long term anticoagulation    S/P placement of cardiac pacemaker    Tremors of nervous system    Hx of diplopia    Hurthle cell adenoma of thyroid    MGUS (monoclonal gammopathy of unknown significance)    Vasomotor rhinitis     Past Medical History:   Diagnosis Date    Actinic keratosis     last assessed - [id number]    Arthritis     Atrial fibrillation with rapid ventricular response (HCC)     last assessed - 26Apr2016    Basal cell carcinoma     Benign colon polyp     last assessed - 27Apr2015    Disease of thyroid gland     Effusion of knee joint right     Resolved - 19Apr2016    Esophageal reflux     Fibromyalgia     last assessed - [id number]    Fibromyalgia, primary     GERD (gastroesophageal reflux disease)     Hypertension     Palpitations     last assessed - 30Apr2013    Peroneal tendonitis, right     last assessed - [id number]    Right lumbar radiculopathy 3/17/2016    Thyroid cancer ([address] )     Thyroid nodule     Trochanteric bursitis of left hip 3/9/2018     Social History     Socioeconomic History    Marital status:       Spouse name: Not on file    Number of children: Not on file    Years of education: Not on file    Highest education level: Not on file   Occupational History    Not on file   Tobacco Use    Smoking status: Never Smoker    Smokeless tobacco: Never Used   Vaping Use    Vaping Use: Never used   Substance and Sexual Activity    Alcohol use: Not Currently     Comment: occosional - every 3 months    Drug use: Never    Sexual activity: Not Currently   Other Topics Concern    Not on file   Social History Narrative    Not on file     Social Determinants of Health     Financial Resource Strain: Not on file   Food Insecurity: Not on file   Transportation Needs: Not on file   Physical Activity: Not on file   Stress: Not on file   Social Connections: Not on file   Intimate Partner Violence: Not on file   Housing Stability: Not on file      Family History   Problem Relation Age of Onset    Heart disease Mother     Diabetes Mother     Heart disease Father     Coronary artery disease Father     Stroke Father         cerebrovascular accident   Paige Courser Heart attack Father         myocardial infarction    Sudden death Father         scd    Other Family         Back disorder    Coronary artery disease Family     Neuropathy Family     Osteoporosis Family     No Known Problems Daughter     No Known Problems Maternal Grandmother     No Known Problems Maternal Grandfather     No Known Problems Paternal Grandmother     No Known Problems Paternal Grandfather     Cancer Maternal Uncle     Breast cancer Maternal Aunt 72    No Known Problems Son     No Known Problems Maternal Aunt     No Known Problems Maternal Aunt     No Known Problems Maternal Aunt     No Known Problems Paternal Aunt     No Known Problems Paternal Aunt     Anuerysm Neg Hx     Clotting disorder Neg Hx     Arrhythmia Neg Hx     Heart failure Neg Hx      Past Surgical History:   Procedure Laterality Date    CATARACT EXTRACTION Bilateral     COLONOSCOPY  03/2018    EYE SURGERY      HYSTERECTOMY      JOINT REPLACEMENT Left     knee    TX REVISE MEDIAN N/CARPAL TUNNEL SURG Right 11/14/2019    Procedure: RELEASE CARPAL TUNNEL;  Surgeon: Lissy Hawk MD;  [facility];  Service: Orthopedics    TX THYROID LOBECTOMY,UNILAT Left 12/16/2020    Procedure: Left THYROID LOBECTOMY;  Surgeon: Mey Xiong MD;  [facility];  Service: ENT    RECTAL POLYPECTOMY      REPLACEMENT TOTAL KNEE Left     last assessed - [id number]    TONSILLECTOMY      TOTAL ABDOMINAL HYSTERECTOMY      TUBAL LIGATION      US GUIDED THYROID BIOPSY  10/14/2020       Current Outpatient Medications:     acetaminophen (TYLENOL) 650 mg CR tablet, Take 1 tablet (650 mg total) by mouth every 6 (six) hours as needed for mild pain or moderate pain (Patient taking differently: Take 300 mg by mouth 2 (two) times a day  ), Disp: 30 tablet, Rfl: 0   amiodarone 200 mg tablet, Take 1 tablet (200 mg total) by mouth daily, Disp: 90 tablet, Rfl: 2    apixaban (ELIQUIS) 5 mg, Take 1 tablet (5 mg total) by mouth 2 (two) times a day, Disp: 56 tablet, Rfl: 0    ascorbic acid (VITAMIN C) 500 mg tablet, Take 500 mg by mouth daily , Disp: , Rfl:     Cholecalciferol (CVS VITAMIN D) 2000 UNITS CAPS, Take 2,000 Units by mouth 2 (two) times a day  , Disp: , Rfl:     Chromium Picolinate (CHROMIUM PICOLATE PO), Take 1 tablet by mouth daily, Disp: , Rfl:     DULoxetine (CYMBALTA) 30 mg delayed release capsule, take 1 capsule by mouth daily every morning take with food, Disp: 30 capsule, Rfl: 2    ezetimibe (ZETIA) 10 mg tablet, TAKE 1 TABLET BY MOUTH  DAILY, Disp: 90 tablet, Rfl: 3    famotidine (PEPCID) 20 mg tablet, Take 20 mg by mouth daily  , Disp: , Rfl:     gabapentin (NEURONTIN) 300 mg capsule, TAKE 2 CAPSULES BY MOUTH AT BEDTIME, Disp: 180 capsule, Rfl: 4    ipratropium (ATROVENT) 0 03 % nasal spray, 2 sprays into each nostril 3 (three) times a day Begin once per day, then progress to twice per day  Progress to three times a day as tolerated  , Disp: 90 mL, Rfl: 3    lidocaine (XYLOCAINE) 5 % ointment, Apply topically as needed for mild pain, Disp: 35 44 g, Rfl: 0    losartan (COZAAR) 50 mg tablet, Take 1 tablet (50 mg total) by mouth 2 (two) times a day, Disp: 180 tablet, Rfl: 1    metoprolol succinate (TOPROL-XL) 50 mg 24 hr tablet, 1 1/2 tab daily, Disp: 90 tablet, Rfl: 3    rOPINIRole (REQUIP) 0 25 mg tablet, TAKE 1 TABLET BY MOUTH  DAILY AT BEDTIME, Disp: 90 tablet, Rfl: 3    traMADol (ULTRAM) 50 mg tablet, Take 1 tablet (50 mg total) by mouth 2 (two) times a day as needed for moderate pain, Disp: 60 tablet, Rfl: 3    TURMERIC PO, Take 1 capsule by mouth 2 (two) times a day, Disp: , Rfl:     zolpidem (AMBIEN) 10 mg tablet, TAKE 1 TABLET BY MOUTH  DAILY AT BEDTIME AS NEEDED  FOR SLEEP, Disp: 90 tablet, Rfl: 1  Allergies   Allergen Reactions    Sotalol Other (See Comments)     Prolonged QTc leading to torsades de pointes     Penicillins Other (See Comments)     As a child calcium deposit in the arm     Ace Inhibitors GI Intolerance     Did feel well on it       Labs:  Lab Results   Component Value Date     05/06/2015    K 3 9 10/27/2021    K 4 8 05/06/2015     10/27/2021    CL 99 (L) 05/06/2015    CO2 30 10/27/2021    CO2 28 05/06/2015    BUN 23 10/27/2021    BUN 19 05/06/2015    CREATININE 1 08 10/27/2021    CREATININE 0 97 05/06/2015    GLUCOSE 114 05/06/2015    CALCIUM 9 7 10/27/2021    CALCIUM 9 0 05/06/2015     Lab Results   Component Value Date    CKTOTAL 100 07/10/2014    TROPONINI <0 02 01/08/2021     Lab Results   Component Value Date    WBC 6 67 04/01/2021    WBC 6 63 05/06/2015    HGB 12 6 04/01/2021    HGB 13 3 05/06/2015    HCT 39 8 04/01/2021    HCT 39 3 05/06/2015    MCV 98 04/01/2021    MCV 93 05/06/2015     04/01/2021     05/06/2015     Lab Results   Component Value Date    CHOL 205 05/06/2015    TRIG 112 04/01/2021    TRIG 90 05/06/2015    HDL 66 04/01/2021    HDL 53 05/06/2015     Imaging: No results found  DEVICE CHECK  3-    MDT-DUAL CHAMBER PPM (AAIR-DDDR MODE)/ ACTIVE SYSTEM IS MRI CONDITIONAL   CARELINK TRANSMISSION: BATTERY VOLTAGE ADEQUATE (15 YRS)  AP 81 4%  <0 1% (AAIR-DDDR 60); ALL AVAILABLE LEAD PARAMETERS WITHIN NORMAL LIMITS  1 AT/AF EPISODE TREATED WITH UNSUCCESSFULLY WITH rATP (0/1-0%)  S/P DC CV ON 3/11/22 & PATIENT TAKES ELIQUIS, AMIODORONE, METOPROLOL SUCC  NORMAL DEVICE FUNCTION    ES       Cardioversion  3-        The patient was identified in the GI lab  A time out procedure was performed      The patient was sedated by the anesthesia service with propofol      The patient was cardioverted to sinus rhythm with a single 200 J biphasic shock        Patient has a [employer]tronic pacemaker, which was interrogated before and after the cardioversion, there were no changes to parameters  Sinus rhythm was confirmed post cardioversion      There were no complications  The patient was monitored for the appropriate time interval in the holding area, and was transferred back to the medical          Cardioversion  1-6-2022    Cardioversion     Andres Steven is a 67 y o  female who follows with Dr Chilo Cabral in the office       Indication: Symptomatic atrial fibrillation  Anti-coagulation: Apixaban taken uninterrupted for one month prior to procedure  Anesthesia: Conscious sedation provided by anesthesiology with propofol  Electrical Pad Placement: Anteroposteriorly interscapular region and upper sternum  Successful cardioversion following a single synchronized biphasic shock at 200 J  Complications: None  Sinus rhythm documented by 12 lead ECG  Disposition: Echo lab recovery area followed by discharge to home       Cardiology fellow: Karen Ayon MD  Attending: Marcelina Alvarado,          echocardiogram   January 2021  LEFT VENTRICLE:  Systolic function was normal  Ejection fraction was estimated to be 55 %  There were no regional wall motion abnormalities  Wall thickness was at the upper limits of normal   Doppler parameters were consistent with elevated ventricular end-diastolic filling pressure      LEFT ATRIUM:  The atrium was mildly to moderately dilated      RIGHT ATRIUM:  The atrium was mildly dilated      MITRAL VALVE:  There was mild stenosis      TRICUSPID VALVE:  There was mild to moderate regurgitation  Pulmonary artery systolic pressure was mildly increased             Review of Systems:  Review of Systems   All other systems reviewed and are negative  as described in my history of present illness        Physical Exam:  Physical Exam  Vitals reviewed  Constitutional:       Appearance: Normal appearance  She is well-developed  She is not ill-appearing  Comments: Not in any distress at the current time   HENT:      Head: Normocephalic and atraumatic        Right Ear: External ear normal       Left Ear: External ear normal       Nose: Nose normal       Mouth/Throat:      Pharynx: Uvula midline  Eyes:      General: Lids are normal  No scleral icterus  Extraocular Movements: Extraocular movements intact  Conjunctiva/sclera: Conjunctivae normal       Pupils: Pupils are equal, round, and reactive to light  Comments: No pallor  No cyanosis  No icterus   Neck:      Thyroid: No thyromegaly  Vascular: No carotid bruit or JVD  Trachea: Trachea normal       Comments: No jugular lymphadenopathy  Cardiovascular:      Rate and Rhythm: Normal rate and regular rhythm  Chest Wall: PMI is not displaced  Pulses: Normal pulses  Heart sounds: Normal heart sounds, S1 normal and S2 normal  No murmur heard  No friction rub  No gallop  No S3 or S4 sounds  Pulmonary:      Effort: Pulmonary effort is normal  No accessory muscle usage or respiratory distress  Breath sounds: Normal breath sounds  No decreased breath sounds, wheezing, rhonchi or rales  Chest:      Chest wall: No tenderness  Abdominal:      General: Abdomen is flat  Bowel sounds are normal  There is no distension  Palpations: Abdomen is soft  There is no hepatomegaly, splenomegaly or mass  Tenderness: There is no abdominal tenderness  Musculoskeletal:         General: No swelling, tenderness or deformity  Normal range of motion  Cervical back: Normal range of motion and neck supple  No rigidity  No muscular tenderness  Lymphadenopathy:      Cervical: No cervical adenopathy  Skin:     Coloration: Skin is not jaundiced or pale  Findings: No abrasion, bruising, erythema, lesion or rash  Nails: There is no clubbing  Neurological:      Mental Status: She is alert and oriented to person, place, and time  Mental status is at baseline  Cranial Nerves: No cranial nerve deficit        Comments: Facial symmetry is retained  Extraocular movements are retained  Head neck tongue and palate movement are retained and symmetric   Psychiatric:         Mood and Affect: Mood normal          Speech: Speech normal          Behavior: Behavior normal          Thought Content: Thought content normal          Discussion/Summary:  1   Paroxysmal atrial fibrillation  Patient was having recurrent episodes of atrial fibrillation, RVR, diastolic heart failure   She underwent PVI for paroxysmal atrial fibrillation   She did developed torsades on the operating table when her heart rate was low and she was on sotalol    Postprocedure she was left on flecainide   She did well for some time   She was back in atrial fibrillation with RVR and symptomatic   She has been loaded with amiodarone and cardioverted to normal rhythm  This has led to adequate control of symptoms    Goal is to discontinue amiodarone in about 6 months  If patient has recurrence of AFib will need to proceed with repeat ablation  Check the PVI, posterior wall isolation, right sided flutter line if any flutter noted      We had a very detailed discussion as far as management of atrial fibrillation   my detailed recommendation for this patient are as follows         1 - natural history of disease   atrial fibrillation is a disease of age   prevalence is 1% in the 4th decade , 2-3% by age 72 and 12-13% by age 80   since it increases with age , definitive therapy is indicated         2 - sleep apnea    No significant sleep apnea in 2016        3 - thyroid function   hyperthyroidism is a common precipitator of atrial fibrillation   TSH - 1 759 in Sept 2018  Recently underwent therapy for thyroid cancer         4 -Aggressive management of  hypertension - on amlodepin  heart failure - LVEF =60%        5 - anticoagulation   patient's chads Vasc score is -3  hypertension   age   gender      Is on apixaban     6 - rate control medication   beta-blocker effect of sotalol           7 - rhythm control medication   Sotalol caused torsades  Flecainide cause AFib to go into flutter with a much more rapid rate     Patient is being started on amiodarone  200 mg 3 times a day loading for 2 weeks, then 200 mg daily  Check TSH, CMP, PFT with DLCO and eye evaluation        8 - ablation   Patient has already undergone PVI        Summary of my recommendation for the patient   Plan to use amiodarone for 6 months   Thereafter will discontinue amiodarone   If patient has recurrence of atrial flutter fib will proceed with repeat ablation   This time will add flutter line on the right if any flutter induced and posterior wall isolation of the left                   2  Hypertension  Not well controlled, 178/80  Losartan, metoprolol  Will need titration of medication           3  Thyroid cancer   Recently underwent surgery   Check TSH   Need to follow thyroid status closely, hyperthyroidism can precipitate atrial fibrillation        4  Hyperlipidemia   On zetia         5   Long term anticoagulation  CHADSVASc -3  On apixaban         Summary of my recommendation for the patient  Check thyroid status closely, hyperthyroidism can precipitate atrial fibrillation  Check TSH as patient is on amiodarone    Titration of medication for blood pressure as per primary    Plan to use amiodarone for 6 months   Thereafter will discontinue amiodarone   If patient has recurrence of atrial flutter fib will proceed with repeat ablation   This time will add flutter line on the right if any flutter noted and posterior wall isolation of the left

## 2022-03-23 NOTE — LETTER
March 24, 2022     Suzanne Jackson, [address] [address]    Patient: Merlinda Sheen   YOB: 1945   Date of Visit: 3/23/2022       Dear Dr Marlin Hammond:    Thank you for referring Sarahi Max to me for evaluation  Below are my notes for this consultation  If you have questions, please do not hesitate to call me  I look forward to following your patient along with you  Sincerely,        Humera Noel MD        CC: DO Brittni Soares MD  3/24/2022  6:42 AM  Sign when Signing Visit                                             Cardiology Follow Up    Merlinda Sheen  1945  [id number]  [address]  [facility] [address]  [phone]    1  Atrial fibrillation, unspecified type ([facility] [address] )  POCT ECG    Comprehensive metabolic panel    Spirometry with diffusing capacity    TSH, 3rd generation with Free T4 reflex   2  Long term current use of amiodarone  Comprehensive metabolic panel    Spirometry with diffusing capacity    TSH, 3rd generation with Free T4 reflex   3  Malignant neoplasm of thyroid gland ([facility] [address] )     4  Essential hypertension     5  Paroxysmal atrial fibrillation (HCC)     6  Chronic diastolic CHF (congestive heart failure) ([facility] [address] )     7  Mixed hyperlipidemia     8  Current use of long term anticoagulation     9  S/P placement of cardiac pacemaker     10   MGUS (monoclonal gammopathy of unknown significance)         Interval History:   The patient underwent comprehensive ablation for paroxysmal atrial fibrillation  During the procedure she went into ventricular fibrillation-she was bradycardic and on sotalol  Post ablation procedures she also underwent pacemaker placement    Patient was doing well for sometime  However she has recurrence of atrial fibrillation with RVR -She is symptomatic with the palpitation  Prior to ablation she was having recurrent episodes of AFib with RVR resulting in diastolic heart failure    Patient has been started on amiodarone and cardioverted to normal rhythm  This has led to good control of symptoms    Recently she had a mechanical fall while gardening  There are areas of ecchymosis and bruising over the face      She is not complaining of anginal like chest pain  She is not complaining of worsening orthopnea or PND  She is not complaining of worsening leg swelling    She does feel the palpitation and it is uncomfortable     Patient is a never smoker   She does not abuse alcohol  She does not use recreational drugs   She does not use energy drinks     She does not have a history of snoring   She does not have history of morning fatigue or daytime sleepiness     There is a family history of diabetes, coronary artery disease, stroke      prior medical history  The patient is a pleasant 18-year-old lady, with a history of A fib    This has been present for at least 6 years, but there has been a recent increase in frequency and duration  Was started on sotalol 80 bid about 3 years ago     Predominant symptom is palpitation and occasional shortness of breath and chest pain  There has been no episode of presyncope or syncope  She does not complain of orthopnea or paroxysmal nocturnal dyspnea  Patient does not have any leg swelling     she does have a history of snoring, morning fatigability and daytime sleepiness  However sleep study was negative for significant AKIN     The patient does have a history of knee replacement and has significant arthritis of her other knee     She takes care of her  and cannot stay too long in hospital      BP (!) 178/80 (BP Location: Right arm, Patient Position: Sitting, Cuff Size: Adult)   Pulse 63   Ht 5' 2" (1 575 m)   Wt 75 6 kg (166 lb 11 2 oz)   LMP 02/01/1990 (Within Weeks)   BMI 30 49 kg/m²       Patient Active Problem List   Diagnosis    Essential hypertension    Allergic rhinitis    Fibromyalgia  Hyperlipidemia    Insomnia    Lumbar spondylosis    Lumbar stenosis    Osteoarthrosis, hand    Osteoarthritis of knee    Osteopenia    Paroxysmal atrial fibrillation (HCC)    Peripheral neuropathy    Restless legs syndrome    TMJ syndrome    Vitamin D deficiency    Osteoarthritis of shoulder    Carpal tunnel syndrome on right    Arthritis of both acromioclavicular joints    Chronic rhinitis    Chronic diastolic CHF (congestive heart failure) (HCC)    Malignant neoplasm of thyroid gland (HCC)    Current use of long term anticoagulation    S/P placement of cardiac pacemaker    Tremors of nervous system    Hx of diplopia    Hurthle cell adenoma of thyroid    MGUS (monoclonal gammopathy of unknown significance)    Vasomotor rhinitis     Past Medical History:   Diagnosis Date    Actinic keratosis     last assessed - [id number]    Arthritis     Atrial fibrillation with rapid ventricular response (HCC)     last assessed - [id number]    Basal cell carcinoma     Benign colon polyp     last assessed - [id number]    Disease of thyroid gland     Effusion of knee joint right     Resolved - [id number]    Esophageal reflux     Fibromyalgia     last assessed - [id number]    Fibromyalgia, primary     GERD (gastroesophageal reflux disease)     Hypertension     Palpitations     last assessed - [id number]    Peroneal tendonitis, right     last assessed - [id number]    Right lumbar radiculopathy 3/17/2016    Thyroid cancer (HCC)     Thyroid nodule     Trochanteric bursitis of left hip 3/9/2018     Social History     Socioeconomic History    Marital status:       Spouse name: Not on file    Number of children: Not on file    Years of education: Not on file    Highest education level: Not on file   Occupational History    Not on file   Tobacco Use    Smoking status: Never Smoker    Smokeless tobacco: Never Used   Vaping Use    Vaping Use: Never used   Substance and Sexual Activity    Alcohol use: Not Currently     Comment: occosional - every 3 months    Drug use: Never    Sexual activity: Not Currently   Other Topics Concern    Not on file   Social History Narrative    Not on file     Social Determinants of Health     Financial Resource Strain: Not on file   Food Insecurity: Not on file   Transportation Needs: Not on file   Physical Activity: Not on file   Stress: Not on file   Social Connections: Not on file   Intimate Partner Violence: Not on file   Housing Stability: Not on file      Family History   Problem Relation Age of Onset    Heart disease Mother     Diabetes Mother     Heart disease Father     Coronary artery disease Father     Stroke Father         cerebrovascular accident    Heart attack Father         myocardial infarction    Sudden death Father         scd    Other Family         Back disorder    Coronary artery disease Family     Neuropathy Family     Osteoporosis Family     No Known Problems Daughter     No Known Problems Maternal Grandmother     No Known Problems Maternal Grandfather     No Known Problems Paternal Grandmother     No Known Problems Paternal Grandfather     Cancer Maternal Uncle     Breast cancer Maternal Aunt 72    No Known Problems Son     No Known Problems Maternal Aunt     No Known Problems Maternal Aunt     No Known Problems Maternal Aunt     No Known Problems Paternal Aunt     No Known Problems Paternal Aunt     Anuerysm Neg Hx     Clotting disorder Neg Hx     Arrhythmia Neg Hx     Heart failure Neg Hx      Past Surgical History:   Procedure Laterality Date    CATARACT EXTRACTION Bilateral     COLONOSCOPY  03/2018    EYE SURGERY      HYSTERECTOMY      JOINT REPLACEMENT Left     knee    AR REVISE MEDIAN N/CARPAL TUNNEL SURG Right 11/14/2019    Procedure: RELEASE CARPAL TUNNEL;  Surgeon: Balaji Rizvi MD;  [facility];  Service: Orthopedics    AR THYROID LOBECTOMY,UNILAT Left 12/16/2020    Procedure: Left THYROID LOBECTOMY;  Surgeon: Kd Padilla MD;  [facility];  Service: ENT    RECTAL POLYPECTOMY      REPLACEMENT TOTAL KNEE Left     last assessed - 27Apr2015    TONSILLECTOMY      TOTAL ABDOMINAL HYSTERECTOMY      TUBAL LIGATION      US GUIDED THYROID BIOPSY  10/14/2020       Current Outpatient Medications:     acetaminophen (TYLENOL) 650 mg CR tablet, Take 1 tablet (650 mg total) by mouth every 6 (six) hours as needed for mild pain or moderate pain (Patient taking differently: Take 300 mg by mouth 2 (two) times a day  ), Disp: 30 tablet, Rfl: 0    amiodarone 200 mg tablet, Take 1 tablet (200 mg total) by mouth daily, Disp: 90 tablet, Rfl: 2    apixaban (ELIQUIS) 5 mg, Take 1 tablet (5 mg total) by mouth 2 (two) times a day, Disp: 56 tablet, Rfl: 0    ascorbic acid (VITAMIN C) 500 mg tablet, Take 500 mg by mouth daily , Disp: , Rfl:     Cholecalciferol (CVS VITAMIN D) 2000 UNITS CAPS, Take 2,000 Units by mouth 2 (two) times a day  , Disp: , Rfl:     Chromium Picolinate (CHROMIUM PICOLATE PO), Take 1 tablet by mouth daily, Disp: , Rfl:     DULoxetine (CYMBALTA) 30 mg delayed release capsule, take 1 capsule by mouth daily every morning take with food, Disp: 30 capsule, Rfl: 2    ezetimibe (ZETIA) 10 mg tablet, TAKE 1 TABLET BY MOUTH  DAILY, Disp: 90 tablet, Rfl: 3    famotidine (PEPCID) 20 mg tablet, Take 20 mg by mouth daily  , Disp: , Rfl:     gabapentin (NEURONTIN) 300 mg capsule, TAKE 2 CAPSULES BY MOUTH AT BEDTIME, Disp: 180 capsule, Rfl: 4    ipratropium (ATROVENT) 0 03 % nasal spray, 2 sprays into each nostril 3 (three) times a day Begin once per day, then progress to twice per day  Progress to three times a day as tolerated  , Disp: 90 mL, Rfl: 3    lidocaine (XYLOCAINE) 5 % ointment, Apply topically as needed for mild pain, Disp: 35 44 g, Rfl: 0    losartan (COZAAR) 50 mg tablet, Take 1 tablet (50 mg total) by mouth 2 (two) times a day, Disp: 180 tablet, Rfl: 1    metoprolol succinate (TOPROL-XL) 50 mg 24 hr tablet, 1 1/2 tab daily, Disp: 90 tablet, Rfl: 3    rOPINIRole (REQUIP) 0 25 mg tablet, TAKE 1 TABLET BY MOUTH  DAILY AT BEDTIME, Disp: 90 tablet, Rfl: 3    traMADol (ULTRAM) 50 mg tablet, Take 1 tablet (50 mg total) by mouth 2 (two) times a day as needed for moderate pain, Disp: 60 tablet, Rfl: 3    TURMERIC PO, Take 1 capsule by mouth 2 (two) times a day, Disp: , Rfl:     zolpidem (AMBIEN) 10 mg tablet, TAKE 1 TABLET BY MOUTH  DAILY AT BEDTIME AS NEEDED  FOR SLEEP, Disp: 90 tablet, Rfl: 1  Allergies   Allergen Reactions    Sotalol Other (See Comments)     Prolonged QTc leading to torsades de pointes     Penicillins Other (See Comments)     As a child calcium deposit in the arm     Ace Inhibitors GI Intolerance     Did feel well on it       Labs:  Lab Results   Component Value Date     05/06/2015    K 3 9 10/27/2021    K 4 8 05/06/2015     10/27/2021    CL 99 (L) 05/06/2015    CO2 30 10/27/2021    CO2 28 05/06/2015    BUN 23 10/27/2021    BUN 19 05/06/2015    CREATININE 1 08 10/27/2021    CREATININE 0 97 05/06/2015    GLUCOSE 114 05/06/2015    CALCIUM 9 7 10/27/2021    CALCIUM 9 0 05/06/2015     Lab Results   Component Value Date    CKTOTAL 100 07/10/2014    TROPONINI <0 02 01/08/2021     Lab Results   Component Value Date    WBC 6 67 04/01/2021    WBC 6 63 05/06/2015    HGB 12 6 04/01/2021    HGB 13 3 05/06/2015    HCT 39 8 04/01/2021    HCT 39 3 05/06/2015    MCV 98 04/01/2021    MCV 93 05/06/2015     04/01/2021     05/06/2015     Lab Results   Component Value Date    CHOL 205 05/06/2015    TRIG 112 04/01/2021    TRIG 90 05/06/2015    HDL 66 04/01/2021    HDL 53 05/06/2015     Imaging: No results found  DEVICE CHECK  3-    MDT-DUAL CHAMBER PPM (AAIR-DDDR MODE)/ ACTIVE SYSTEM IS MRI CONDITIONAL   CARELINK TRANSMISSION: BATTERY VOLTAGE ADEQUATE (15 YRS)  AP 81 4%  <0 1% (AAIR-DDDR 60);  ALL AVAILABLE LEAD PARAMETERS WITHIN NORMAL LIMITS  1 AT/AF EPISODE TREATED WITH UNSUCCESSFULLY WITH rATP (0/1-0%)  S/P DC CV ON 3/11/22 & PATIENT TAKES ELIQUIS, AMIODORONE, METOPROLOL SUCC  NORMAL DEVICE FUNCTION    ES       Cardioversion  3-        The patient was identified in the GI lab  A time out procedure was performed      The patient was sedated by the anesthesia service with propofol      The patient was cardioverted to sinus rhythm with a single 200 J biphasic shock        Patient has a [employer]tronic pacemaker, which was interrogated before and after the cardioversion, there were no changes to parameters  Sinus rhythm was confirmed post cardioversion      There were no complications  The patient was monitored for the appropriate time interval in the holding area, and was transferred back to the medical          Cardioversion  1-6-2022    Cardioversion     Gabby Holguin is a 67 y o  female who follows with Dr Jenna Pantoja in the office       Indication: Symptomatic atrial fibrillation  Anti-coagulation: Apixaban taken uninterrupted for one month prior to procedure  Anesthesia: Conscious sedation provided by anesthesiology with propofol  Electrical Pad Placement: Anteroposteriorly interscapular region and upper sternum  Successful cardioversion following a single synchronized biphasic shock at 200 J  Complications: None  Sinus rhythm documented by 12 lead ECG  Disposition: Echo lab recovery area followed by discharge to home       Cardiology fellow: Rylee Chowdhury MD  Attending: Nara Pressley DO         echocardiogram   January 2021  LEFT VENTRICLE:  Systolic function was normal  Ejection fraction was estimated to be 55 %  There were no regional wall motion abnormalities    Wall thickness was at the upper limits of normal   Doppler parameters were consistent with elevated ventricular end-diastolic filling pressure      LEFT ATRIUM:  The atrium was mildly to moderately dilated      RIGHT ATRIUM:  The atrium was mildly dilated      MITRAL VALVE:  There was mild stenosis      TRICUSPID VALVE:  There was mild to moderate regurgitation  Pulmonary artery systolic pressure was mildly increased             Review of Systems:  Review of Systems   All other systems reviewed and are negative  as described in my history of present illness        Physical Exam:  Physical Exam  Vitals reviewed  Constitutional:       Appearance: Normal appearance  She is well-developed  She is not ill-appearing  Comments: Not in any distress at the current time   HENT:      Head: Normocephalic and atraumatic  Right Ear: External ear normal       Left Ear: External ear normal       Nose: Nose normal       Mouth/Throat:      Pharynx: Uvula midline  Eyes:      General: Lids are normal  No scleral icterus  Extraocular Movements: Extraocular movements intact  Conjunctiva/sclera: Conjunctivae normal       Pupils: Pupils are equal, round, and reactive to light  Comments: No pallor  No cyanosis  No icterus   Neck:      Thyroid: No thyromegaly  Vascular: No carotid bruit or JVD  Trachea: Trachea normal       Comments: No jugular lymphadenopathy  Cardiovascular:      Rate and Rhythm: Normal rate and regular rhythm  Chest Wall: PMI is not displaced  Pulses: Normal pulses  Heart sounds: Normal heart sounds, S1 normal and S2 normal  No murmur heard  No friction rub  No gallop  No S3 or S4 sounds  Pulmonary:      Effort: Pulmonary effort is normal  No accessory muscle usage or respiratory distress  Breath sounds: Normal breath sounds  No decreased breath sounds, wheezing, rhonchi or rales  Chest:      Chest wall: No tenderness  Abdominal:      General: Abdomen is flat  Bowel sounds are normal  There is no distension  Palpations: Abdomen is soft  There is no hepatomegaly, splenomegaly or mass  Tenderness: There is no abdominal tenderness  Musculoskeletal:         General: No swelling, tenderness or deformity  Normal range of motion  Cervical back: Normal range of motion and neck supple  No rigidity  No muscular tenderness  Lymphadenopathy:      Cervical: No cervical adenopathy  Skin:     Coloration: Skin is not jaundiced or pale  Findings: No abrasion, bruising, erythema, lesion or rash  Nails: There is no clubbing  Neurological:      Mental Status: She is alert and oriented to person, place, and time  Mental status is at baseline  Cranial Nerves: No cranial nerve deficit  Comments: Facial symmetry is retained  Extraocular movements are retained  Head neck tongue and palate movement are retained and symmetric   Psychiatric:         Mood and Affect: Mood normal          Speech: Speech normal          Behavior: Behavior normal          Thought Content: Thought content normal          Discussion/Summary:  1   Paroxysmal atrial fibrillation  Patient was having recurrent episodes of atrial fibrillation, RVR, diastolic heart failure   She underwent PVI for paroxysmal atrial fibrillation   She did developed torsades on the operating table when her heart rate was low and she was on sotalol    Postprocedure she was left on flecainide   She did well for some time   She was back in atrial fibrillation with RVR and symptomatic   She has been loaded with amiodarone and cardioverted to normal rhythm  This has led to adequate control of symptoms    Goal is to discontinue amiodarone in about 6 months  If patient has recurrence of AFib will need to proceed with repeat ablation  Check the PVI, posterior wall isolation, right sided flutter line if any flutter noted      We had a very detailed discussion as far as management of atrial fibrillation   my detailed recommendation for this patient are as follows         1 - natural history of disease   atrial fibrillation is a disease of age   prevalence is 1% in the 4th decade , 2-3% by age 72 and 12-13% by age 80   since it increases with age , definitive therapy is indicated         2 - sleep apnea    No significant sleep apnea in 2016        3 - thyroid function   hyperthyroidism is a common precipitator of atrial fibrillation   TSH - 1 759 in Sept 2018  Recently underwent therapy for thyroid cancer         4 -Aggressive management of  hypertension - on amlodepin  heart failure - LVEF =60%        5 - anticoagulation   patient's chads Vasc score is -3  hypertension   age   gender      Is on apixaban     6 - rate control medication   beta-blocker effect of sotalol           7 - rhythm control medication   Sotalol caused torsades  Flecainide cause AFib to go into flutter with a much more rapid rate     Patient is being started on amiodarone  200 mg 3 times a day loading for 2 weeks, then 200 mg daily  Check TSH, CMP, PFT with DLCO and eye evaluation        8 - ablation   Patient has already undergone PVI        Summary of my recommendation for the patient   Plan to use amiodarone for 6 months   Thereafter will discontinue amiodarone   If patient has recurrence of atrial flutter fib will proceed with repeat ablation   This time will add flutter line on the right if any flutter induced and posterior wall isolation of the left                   2  Hypertension  Not well controlled, 178/80  Losartan, metoprolol  Will need titration of medication           3  Thyroid cancer   Recently underwent surgery   Check TSH   Need to follow thyroid status closely, hyperthyroidism can precipitate atrial fibrillation        4  Hyperlipidemia   On zetia         5   Long term anticoagulation  CHADSVASc -3  On apixaban         Summary of my recommendation for the patient  Check thyroid status closely, hyperthyroidism can precipitate atrial fibrillation  Check TSH as patient is on amiodarone    Plan to use amiodarone for 6 months   Thereafter will discontinue amiodarone   If patient has recurrence of atrial flutter fib will proceed with repeat ablation   This time will add flutter line on the right if any flutter noted and posterior wall isolation of the left

## 2022-03-28 NOTE — PROGRESS NOTES
FAMILY MEDICINE PROGRESS NOTE    Date of Service: 22  Primary Care Provider:   Carmen Flores MD       Name: Yulisa Schwab       : 1945       Age:76 y o  Sex: female      MRN: [id number]      Chief Complaint:Headache (Daily and off for the last year) and Fall (fell again last week planting shrubs)       ASSESSMENT and PLAN:  Yulisa Schwab is a 67 y o  female with:     Problem List Items Addressed This Visit        Cardiovascular and Mediastinum    Essential hypertension     BP Readings from Last 3 Encounters:   22 144/88   22 (!) 172/76   22 (!) 178/80     Not in goal range, though improved from previous, continue current management with losartan 50 mg twice daily and metoprolol succinate 75 mg (recently increased), continue to check at home, if blood pressure remains elevated she may benefit from increased dose of metoprolol or additional medication  Discussed that headaches could be due to high blood pressure  Nervous and Auditory    Chronic tension-type headache, not intractable - Primary     Headaches sound to be tension type, only last 10 minutes, no red flags  Possibly due to poorly controlled hypertension, stress  Also considered medication overuse headache  Recommended keeping headache diary       Do not suspect it is related to fall as it was present previously, but given that she had facial bruising following fall last week and is on Eliquis will obtain CT head to rule out bleeding            Other    Current use of long term anticoagulation    Relevant Orders    CT head wo contrast      Other Visit Diagnoses     Fall, initial encounter        Relevant Orders    CT head wo contrast    Ambulatory Referral to Physical Therapy    Gait disturbance        Relevant Orders    Ambulatory Referral to Physical Therapy    Encounter for immunization        Relevant Orders    PNEUMOCOCCAL POLYSACCHARIDE VACCINE 23-VALENT =>3YO SQ IM (Completed) SUBJECTIVE:  Jacy Yusuf is a 67 y o  female with afib, hypertension, history of thyroid cancer, MGUS, heart failure who presents today with a chief complaint of Headache (Daily and off for the last year) and Fall (fell again last week planting shrubs)  HPI     She fell last week in her garden, she lost her balance on an uneven surface  She rolled down a hill and landed on her face and sustained a black eye  She hurt her knee  She reports that she has had a headache on an off for the last year  The headache occurs every other day  The headache is located in the front or the back of her head  She does have tight neck muscles and when she flexes her neck she has pain  Headache: Patient complains of headache  She does not have a headache at this time  Description of Headaches:  Location of pain: occipital, frontal  Radiation of pain?:none  Character of pain:dull  Severity of pain: 4  Accompanying symptoms: none  Prodromal sx?: none  Rapidity of onset: gradual  Typical duration of individual headache: 10 minutes  Are most headaches similar in presentation?  yes  Typical precipitants: when her neck is stressed or tight this brings on the headache    Temporal Pattern of Headaches:  Started having HAs 1 year ago  Worst time of day: cannot identify inciting factors   Awaken from sleep?: no, but will wake up with the headache  Seasonal pattern?: no  Clustering of HAs over time? no  Overall pattern since problem began: unchanged    Degree of Functional Impairment: no and brief    Current Use of Meds to Treat HA:  Abortive meds? acetaminophen  Daily use? yes - takes Tylenol arthritis 2 tablets twice a day  Prophylactic meds? none    Additional Relevant History:  History of head/neck trauma? no  History of head/neck surgery? no  Family h/o headache problems? no  Use of meds that might worsen HAs? no  Exposure to carbon monoxide? no  Substance use: none    She does report that her blood pressure has been more elevated at home  She is on losartan 50 mg twice daily, metoprolol succinate 75 mg daily  The metoprolol was recently increased  She also reports difficulty with her balance  She states she was recently diagnosed with essential tremor  She will be seeing neurology in July  Review of Systems   HENT: Negative for hearing loss and tinnitus  Eyes: Negative for visual disturbance  Musculoskeletal: Positive for arthralgias (knee pain ), back pain and gait problem  Neurological: Positive for headaches  I have reviewed the patient's Past Medical History  Current Outpatient Medications:     acetaminophen (TYLENOL) 650 mg CR tablet, Take 1 tablet (650 mg total) by mouth every 6 (six) hours as needed for mild pain or moderate pain (Patient taking differently: Take 300 mg by mouth 2 (two) times a day  ), Disp: 30 tablet, Rfl: 0    amiodarone 200 mg tablet, Take 1 tablet (200 mg total) by mouth daily, Disp: 90 tablet, Rfl: 2    apixaban (ELIQUIS) 5 mg, Take 1 tablet (5 mg total) by mouth 2 (two) times a day, Disp: 56 tablet, Rfl: 0    ascorbic acid (VITAMIN C) 500 mg tablet, Take 500 mg by mouth daily , Disp: , Rfl:     Cholecalciferol ([employer]S VITAMIN D) 2000 UNITS CAPS, Take 2,000 Units by mouth 2 (two) times a day  , Disp: , Rfl:     Chromium Picolinate (CHROMIUM PICOLATE PO), Take 1 tablet by mouth daily, Disp: , Rfl:     DULoxetine (CYMBALTA) 30 mg delayed release capsule, take 1 capsule by mouth daily every morning take with food, Disp: 30 capsule, Rfl: 2    ezetimibe (ZETIA) 10 mg tablet, TAKE 1 TABLET BY MOUTH  DAILY, Disp: 90 tablet, Rfl: 3    famotidine (PEPCID) 20 mg tablet, Take 20 mg by mouth daily  , Disp: , Rfl:     gabapentin (NEURONTIN) 300 mg capsule, TAKE 2 CAPSULES BY MOUTH AT BEDTIME, Disp: 180 capsule, Rfl: 4    ipratropium (ATROVENT) 0 03 % nasal spray, 2 sprays into each nostril 3 (three) times a day Begin once per day, then progress to twice per day   Progress to three times a day as tolerated  , Disp: 90 mL, Rfl: 3    lidocaine (XYLOCAINE) 5 % ointment, Apply topically as needed for mild pain, Disp: 35 44 g, Rfl: 0    losartan (COZAAR) 50 mg tablet, Take 1 tablet (50 mg total) by mouth 2 (two) times a day, Disp: 180 tablet, Rfl: 1    metoprolol succinate (TOPROL-XL) 50 mg 24 hr tablet, 1 1/2 tab daily, Disp: 90 tablet, Rfl: 3    rOPINIRole (REQUIP) 0 25 mg tablet, TAKE 1 TABLET BY MOUTH  DAILY AT BEDTIME, Disp: 90 tablet, Rfl: 3    traMADol (ULTRAM) 50 mg tablet, Take 1 tablet (50 mg total) by mouth 2 (two) times a day as needed for moderate pain, Disp: 60 tablet, Rfl: 3    TURMERIC PO, Take 1 capsule by mouth 2 (two) times a day, Disp: , Rfl:     zolpidem (AMBIEN) 10 mg tablet, TAKE 1 TABLET BY MOUTH  DAILY AT BEDTIME AS NEEDED  FOR SLEEP, Disp: 90 tablet, Rfl: 1    OBJECTIVE:  /88 (BP Location: Left arm, Patient Position: Sitting, Cuff Size: Standard)   Pulse 75   Temp 98 2 °F (36 8 °C)   Resp 18   Ht 5' 2" (1 575 m)   Wt 76 2 kg (168 lb)   LMP 02/01/1990 (Within Weeks)   SpO2 97%   Breastfeeding No   BMI 30 73 kg/m²    BP Readings from Last 3 Encounters:   03/29/22 144/88   03/29/22 (!) 172/76   03/23/22 (!) 178/80      Wt Readings from Last 3 Encounters:   03/29/22 76 2 kg (168 lb)   03/29/22 75 4 kg (166 lb 3 2 oz)   03/23/22 75 6 kg (166 lb 11 2 oz)      Physical Exam  Constitutional:       General: She is not in acute distress  Appearance: Normal appearance  She is not ill-appearing or toxic-appearing  HENT:      Head: Normocephalic and atraumatic  Right Ear: Tympanic membrane, ear canal and external ear normal  There is no impacted cerumen  Left Ear: Tympanic membrane, ear canal and external ear normal  There is no impacted cerumen  Nose: Nose normal    Eyes:      General:         Right eye: No discharge  Left eye: No discharge  Extraocular Movements: Extraocular movements intact        Conjunctiva/sclera: Conjunctivae normal       Pupils: Pupils are equal, round, and reactive to light  Cardiovascular:      Rate and Rhythm: Normal rate and regular rhythm  Pulses: Normal pulses  Heart sounds: Normal heart sounds  No murmur heard  No friction rub  No gallop  Pulmonary:      Effort: Pulmonary effort is normal  No respiratory distress  Breath sounds: Normal breath sounds  No stridor  No wheezing, rhonchi or rales  Musculoskeletal:      Cervical back: Normal range of motion and neck supple  No rigidity or tenderness  Right lower leg: No edema  Left lower leg: No edema  Lymphadenopathy:      Cervical: No cervical adenopathy  Skin:     General: Skin is warm and dry  Neurological:      General: No focal deficit present  Mental Status: She is alert and oriented to person, place, and time  Cranial Nerves: No cranial nerve deficit  Sensory: No sensory deficit  Motor: No weakness or tremor  Coordination: Coordination normal  Finger-Nose-Finger Test normal  Rapid alternating movements normal    Psychiatric:         Mood and Affect: Mood and affect normal          Behavior: Behavior normal               Return for AWV in anytime May or later  Indy Silva MD    Note: Portions of the record have been created with voice recognition software  Occasional wrong word or "sound a like" substitutions may have occurred due to the inherent limitations of voice recognition software  Read the chart carefully and recognize, using context, where substitutions have occurred

## 2022-03-29 ENCOUNTER — OFFICE VISIT (OUTPATIENT)
Dept: OBGYN CLINIC | Facility: HOSPITAL | Age: 77
End: 2022-03-29
Payer: MEDICARE

## 2022-03-29 ENCOUNTER — HOSPITAL ENCOUNTER (OUTPATIENT)
Dept: RADIOLOGY | Facility: HOSPITAL | Age: 77
Discharge: HOME | End: 2022-03-29
Attending: ORTHOPAEDIC SURGERY
Payer: MEDICARE

## 2022-03-29 ENCOUNTER — OFFICE VISIT (OUTPATIENT)
Dept: FAMILY MEDICINE CLINIC | Facility: CLINIC | Age: 77
End: 2022-03-29
Payer: MEDICARE

## 2022-03-29 VITALS
WEIGHT: 166.2 LBS | HEART RATE: 64 BPM | DIASTOLIC BLOOD PRESSURE: 76 MMHG | HEIGHT: 62 IN | SYSTOLIC BLOOD PRESSURE: 172 MMHG | BODY MASS INDEX: 30.59 KG/M2

## 2022-03-29 VITALS
WEIGHT: 168 LBS | OXYGEN SATURATION: 97 % | RESPIRATION RATE: 18 BRPM | SYSTOLIC BLOOD PRESSURE: 144 MMHG | DIASTOLIC BLOOD PRESSURE: 88 MMHG | HEART RATE: 75 BPM | BODY MASS INDEX: 30.91 KG/M2 | HEIGHT: 62 IN | TEMPERATURE: 98.2 F

## 2022-03-29 DIAGNOSIS — M25.561 CHRONIC PAIN OF RIGHT KNEE: ICD-10-CM

## 2022-03-29 DIAGNOSIS — Z96.652 HISTORY OF TOTAL LEFT KNEE REPLACEMENT: ICD-10-CM

## 2022-03-29 DIAGNOSIS — Z79.01 CURRENT USE OF LONG TERM ANTICOAGULATION: ICD-10-CM

## 2022-03-29 DIAGNOSIS — M17.11 PRIMARY OSTEOARTHRITIS OF RIGHT KNEE: Primary | ICD-10-CM

## 2022-03-29 DIAGNOSIS — M70.51 PES ANSERINUS BURSITIS OF BOTH KNEES: ICD-10-CM

## 2022-03-29 DIAGNOSIS — M23.8X2 JOINT LAXITY OF LEFT KNEE: ICD-10-CM

## 2022-03-29 DIAGNOSIS — R26.9 GAIT DISTURBANCE: ICD-10-CM

## 2022-03-29 DIAGNOSIS — Z23 ENCOUNTER FOR IMMUNIZATION: ICD-10-CM
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
## 2022-03-29 DIAGNOSIS — G89.29 CHRONIC PAIN OF RIGHT KNEE: ICD-10-CM

## 2022-03-29 DIAGNOSIS — M17.11 PRIMARY OSTEOARTHRITIS OF RIGHT KNEE: ICD-10-CM

## 2022-03-29 DIAGNOSIS — I10 ESSENTIAL HYPERTENSION: ICD-10-CM

## 2022-03-29 DIAGNOSIS — G44.229 CHRONIC TENSION-TYPE HEADACHE, NOT INTRACTABLE: Primary | ICD-10-CM

## 2022-03-29 DIAGNOSIS — M70.52 PES ANSERINUS BURSITIS OF BOTH KNEES: ICD-10-CM

## 2022-03-29 PROCEDURE — 90732 PPSV23 VACC 2 YRS+ SUBQ/IM: CPT

## 2022-03-29 PROCEDURE — 99214 OFFICE O/P EST MOD 30 MIN: CPT | Performed by: ORTHOPAEDIC SURGERY

## 2022-03-29 PROCEDURE — 99214 OFFICE O/P EST MOD 30 MIN: CPT | Performed by: FAMILY MEDICINE

## 2022-03-29 PROCEDURE — 20610 DRAIN/INJ JOINT/BURSA W/O US: CPT | Performed by: ORTHOPAEDIC SURGERY

## 2022-03-29 PROCEDURE — 90471 IMMUNIZATION ADMIN: CPT

## 2022-03-29 PROCEDURE — 73562 X-RAY EXAM OF KNEE 3: CPT

## 2022-03-29 RX ORDER — LIDOCAINE HYDROCHLORIDE 10 MG/ML
2 INJECTION, SOLUTION INFILTRATION; PERINEURAL
Status: COMPLETED | OUTPATIENT
Start: 2022-03-29 | End: 2022-03-29

## 2022-03-29 RX ORDER — BETAMETHASONE SODIUM PHOSPHATE AND BETAMETHASONE ACETATE 3; 3 MG/ML; MG/ML
12 INJECTION, SUSPENSION INTRA-ARTICULAR; INTRALESIONAL; INTRAMUSCULAR; SOFT TISSUE
Status: COMPLETED | OUTPATIENT
Start: 2022-03-29 | End: 2022-03-29

## 2022-03-29 RX ORDER — BUPIVACAINE HYDROCHLORIDE 2.5 MG/ML
2 INJECTION, SOLUTION INFILTRATION; PERINEURAL
Status: COMPLETED | OUTPATIENT
Start: 2022-03-29 | End: 2022-03-29

## 2022-03-29 RX ADMIN — BETAMETHASONE SODIUM PHOSPHATE AND BETAMETHASONE ACETATE 12 MG: 3; 3 INJECTION, SUSPENSION INTRA-ARTICULAR; INTRALESIONAL; INTRAMUSCULAR; SOFT TISSUE at 10:27

## 2022-03-29 RX ADMIN — BUPIVACAINE HYDROCHLORIDE 2 ML: 2.5 INJECTION, SOLUTION INFILTRATION; PERINEURAL at 10:27

## 2022-03-29 RX ADMIN — LIDOCAINE HYDROCHLORIDE 2 ML: 10 INJECTION, SOLUTION INFILTRATION; PERINEURAL at 10:27

## 2022-03-29 NOTE — PATIENT INSTRUCTIONS
Tension Headache   AMBULATORY CARE:   A tension headache  is often caused by tense muscles in your head or neck and can last anywhere from 30 minutes to several days  Although they are uncomfortable, tension headaches usually do not cause any serious problems  The following can cause muscle tension and trigger a tension headache:  · Eye strain or poor posture, such as from using a computer    · Jaw or dental problems such as temporomandibular joint (TMJ), clenching your jaw, or grinding your teeth    · Activities that cause your head to be held in one position for too long    · Skipping a meal    · Not enough sleep, or sleep apnea (brief periods of not breathing during sleep)    · Food sensitivities, such as to gluten    Common symptoms include the following:   · Dull, constant pain above your eyes and across the back of your head    · Head pain that gets worse as the day goes on    · Pain that may spread over your entire head and to your neck and shoulders    · Tight neck or shoulder muscles    · Head pain that is made worse by bright lights or loud noises    Call your local emergency number (911 in the [address] [place] [address],[address]) if:   · You have difficulty seeing, speaking, or moving  · You have a seizure  Call your doctor if:   · You pass out or become confused  · You have a sudden headache that seems different or much worse than your usual headaches  · You have a headache, fever, and a stiff neck  · Your headaches continue to get worse  · Your headaches happen so often that they affect your ability to do your work or normal activities  · You need to take medicine to help your headaches more often than your healthcare provider says you should  · Your headaches get so bad that they cause you to vomit  · You have questions or concerns about your condition or care  Treatment  may include any of the following:  · NSAIDs , such as ibuprofen, help decrease swelling, pain, and fever   This medicine is available with or without a doctor's order  NSAIDs can cause stomach bleeding or kidney problems in certain people  If you take blood thinner medicine, always ask your healthcare provider if NSAIDs are safe for you  Always read the medicine label and follow directions  · Acetaminophen  decreases pain and fever  It is available without a doctor's order  Ask how much to take and how often to take it  Follow directions  Read the labels of all other medicines you are using to see if they also contain acetaminophen, or ask your doctor or pharmacist  Acetaminophen can cause liver damage if not taken correctly  Do not use more than 4 grams (4,000 milligrams) total of acetaminophen in one day  · Treatment  may be given for back, muscle, or posture problems  You may also need treatment for jaw or tooth problems  Manage your symptoms:   · Keep a headache record  Include when the headaches start and stop and what made them better  Describe your symptoms, such as how the pain feels, where it is, and how bad it is  Record anything you ate or drank for the past 24 hours before your headache  Bring this to follow-up visits  · Apply heat as directed  Heat may help decrease headache pain and muscle spasms  Apply heat on the area for 20 to 30 minutes every 2 hours for as many days as directed  A warm bath may also help relieve muscle tension and spasms  · Apply ice as directed  Ice may help decrease headache pain  Use an ice pack or put crushed ice in a plastic bag  Cover it with a towel and place it on the area for 15 to 20 minutes every hour as directed  · Ask about spinal manipulation  This treatment combines movement, massage, exercise, and physical therapy  It can help relieve a tension headache, and may be able to prevent another if done regularly  Talk to your healthcare provider to make sure it is safe for you  To prevent neck injuries, spinal manipulation should only be done by trained providers      Prevent a tension headache:   · Avoid muscle tension  Do not stay in one position for long periods of time  Use a different pillow if you wake up with sore neck and shoulder muscles  Find ways to relax your muscles, such as massage or resting in a quiet, dark room  · Avoid eye strain  Make sure you have good lighting when you read, sew, or do similar activities  Get yearly eye exams and wear glasses as directed  · Get enough sleep  Get 8 to 10 hours of sleep each night  Create a sleep schedule  Go to bed and wake up at the same times each day  It may be helpful to do something relaxing before bed  Do not watch television right before bed  · Eat a variety of healthy foods  Healthy foods include fruits, vegetables, whole-grain breads, low-fat dairy products, beans, lean meats, and fish  Do not eat foods that trigger your headaches  · Exercise regularly  Exercise helps decrease stress and headaches  Ask about the best exercise plan for you  · Drink liquids as directed  You may need to drink more liquid to prevent dehydration  Dehydration can make a tension headache worse  Ask your healthcare provider how much liquid to drink and which liquids are best for you  Limit caffeine as directed  Caffeine may make a tension headache worse  · Do not drink alcohol  Alcohol can trigger a headache  It can also prevent medicines from stopping your headache  · Do not smoke  Nicotine and other chemicals in cigarettes and cigars can trigger a headache and also cause lung damage  Ask your healthcare provider for information if you currently smoke and need help to quit  E-cigarettes or smokeless tobacco still contain nicotine  Talk to your healthcare provider before you use these products  Follow up with your healthcare provider as directed:  Bring the headache record with you  Write down your questions so you remember to ask them during your visits    © Copyright [employer] 2022 Information is for End User's use only and may not be sold, redistributed or otherwise used for commercial purposes  All illustrations and images included in CareNotes® are the copyrighted property of [employer]  or [employer]  The above information is an  only  It is not intended as medical advice for individual conditions or treatments  Talk to your doctor, nurse or pharmacist before following any medical regimen to see if it is safe and effective for you

## 2022-03-29 NOTE — ASSESSMENT & PLAN NOTE
BP Readings from Last 3 Encounters:   03/29/22 144/88   03/29/22 (!) 172/76   03/23/22 (!) 178/80     Not in goal range, though improved from previous, continue current management with losartan 50 mg twice daily and metoprolol succinate 75 mg (recently increased), continue to check at home, if blood pressure remains elevated she may benefit from increased dose of metoprolol or additional medication  Discussed that headaches could be due to high blood pressure

## 2022-03-29 NOTE — ASSESSMENT & PLAN NOTE
Headaches sound to be tension type, only last 10 minutes, no red flags  Possibly due to poorly controlled hypertension, stress  Also considered medication overuse headache  Recommended keeping headache diary       Do not suspect it is related to fall as it was present previously, but given that she had facial bruising following fall last week and is on Eliquis will obtain CT head to rule out bleeding

## 2022-03-29 NOTE — PROGRESS NOTES
Assessment:   Diagnosis ICD-10-CM Associated Orders   1  Primary osteoarthritis of right knee  M17 11 Large joint arthrocentesis: R knee   2  Chronic pain of right knee  M25 561 Large joint arthrocentesis: R knee    G89 29 Large joint arthrocentesis: bilateral pes anserine bursa   3  History of total left knee replacement  Z96 652     2008   4  Pes anserinus bursitis of both knees  M70 51 Large joint arthrocentesis: bilateral pes anserine bursa    M70 52    5  Joint laxity of left knee  M23 8X2 Brace       Plan:  Diagnostics reviewed and physical exam performed  Diagnosis, treatment options and associated risks were discussed with the patient including no treatment, nonsurgical treatment and potential for surgical intervention  The patient was given the opportunity to ask questions regarding each  Right knee x-rays show advanced degenerative changes with bone-on-bone opposition medial and patellofemoral compartment  Her left knee exam is consistent with laxity due to polyethylene wear  Quality of life decision to pursue revision left knee arthroplasty with polyethylene exchange  Risks and benefits of revision left total knee arthroplasty with polyethylene liner exchange discussed today  Consents obtained  She wishes to wait off until the fall  She will be fitted for a hinged knee brace in the meantime  She was offered, accepted, performed injection of cortisone to her right knee and both pes bursae today for symptomatic relief  She tolerated all 3 injections well  Ice and post injection protocol advised  Weightbearing activities as tolerated  To do next visit:  No follow-ups on file  The above stated was discussed in layman's terms and the patient expressed understanding  All questions were answered to the patient's satisfaction         Scribe Attestation    I,:  Audi Chacon am acting as a scribe while in the presence of the attending physician :       I,:  Chava Swain MD personally performed the services described in this documentation    as scribed in my presence :             Subjective:   Luh Blair is a 67 y o  female who presents today for evaluation of her bilateral knees due to pain  Patient states she has had several falls over the past several months  She does find her left knee will buckle underneath her at times  She has a history of left total knee arthroplasty from 2008  She has pain inferior to the both medial joint lines that bother her at night  She has increased medial knee pain at her right knee that bothers her with weight-bearing activities  She takes Eliquis and cannot take oral NSAIDs  She is experiencing some balance issues and has an appoint with her PCP, Dr Ellyn Ann, this afternoon and hopes to undergo a course of physical therapy  Since we saw her in november last her  passed away in January         Review of systems negative unless otherwise specified in HPI  Review of Systems    Past Medical History:   Diagnosis Date    Actinic keratosis     last assessed - [id number]    Arthritis     Atrial fibrillation with rapid ventricular response (HCC)     last assessed - [id number]    Basal cell carcinoma     Benign colon polyp     last assessed - [id number]    Disease of thyroid gland     Effusion of knee joint right     Resolved - [id number]    Esophageal reflux     Fibromyalgia     last assessed - [id number]    Fibromyalgia, primary     GERD (gastroesophageal reflux disease)     Hypertension     Palpitations     last assessed - [id number]    Peroneal tendonitis, right     last assessed - [id number]    Right lumbar radiculopathy 3/17/2016    Thyroid cancer ([facility] [address] )     Thyroid nodule     Trochanteric bursitis of left hip 3/9/2018       Past Surgical History:   Procedure Laterality Date    CATARACT EXTRACTION Bilateral     COLONOSCOPY  03/2018    EYE SURGERY      HYSTERECTOMY      JOINT REPLACEMENT Left     knee    NM REVISE MEDIAN N/CARPAL TUNNEL SURG Right 11/14/2019    Procedure: RELEASE CARPAL TUNNEL;  Surgeon: Brandie Leong MD;  [facility];  Service: Orthopedics    PA THYROID LOBECTOMY,UNILAT Left 12/16/2020    Procedure: Left THYROID LOBECTOMY;  Surgeon: oLla Zacarias MD;  [facility];  Service: ENT    RECTAL POLYPECTOMY      REPLACEMENT TOTAL KNEE Left     last assessed - [id number]    TONSILLECTOMY      TOTAL ABDOMINAL HYSTERECTOMY      TUBAL LIGATION      US GUIDED THYROID BIOPSY  10/14/2020       Family History   Problem Relation Age of Onset    Heart disease Mother     Diabetes Mother     Heart disease Father     Coronary artery disease Father     Stroke Father         cerebrovascular accident    Heart attack Father         myocardial infarction    Sudden death Father         scd    Other Family         Back disorder    Coronary artery disease Family     Neuropathy Family     Osteoporosis Family     No Known Problems Daughter     No Known Problems Maternal Grandmother     No Known Problems Maternal Grandfather     No Known Problems Paternal Grandmother     No Known Problems Paternal Grandfather     Cancer Maternal Uncle     Breast cancer Maternal Aunt 72    No Known Problems Son     No Known Problems Maternal Aunt     No Known Problems Maternal Aunt     No Known Problems Maternal Aunt     No Known Problems Paternal Aunt     No Known Problems Paternal Aunt     Anuerysm Neg Hx     Clotting disorder Neg Hx     Arrhythmia Neg Hx     Heart failure Neg Hx        Social History     Occupational History    Not on file   Tobacco Use    Smoking status: Never Smoker    Smokeless tobacco: Never Used   Vaping Use    Vaping Use: Never used   Substance and Sexual Activity    Alcohol use: Not Currently     Comment: occosional - every 3 months    Drug use: Never    Sexual activity: Not Currently         Current Outpatient Medications:     acetaminophen (TYLENOL) 650 mg CR tablet, Take 1 tablet (650 mg total) by mouth every 6 (six) hours as needed for mild pain or moderate pain (Patient taking differently: Take 300 mg by mouth 2 (two) times a day  ), Disp: 30 tablet, Rfl: 0    amiodarone 200 mg tablet, Take 1 tablet (200 mg total) by mouth daily, Disp: 90 tablet, Rfl: 2    apixaban (ELIQUIS) 5 mg, Take 1 tablet (5 mg total) by mouth 2 (two) times a day, Disp: 56 tablet, Rfl: 0    ascorbic acid (VITAMIN C) 500 mg tablet, Take 500 mg by mouth daily , Disp: , Rfl:     Cholecalciferol ([employer] VITAMIN D) 2000 UNITS CAPS, Take 2,000 Units by mouth 2 (two) times a day  , Disp: , Rfl:     Chromium Picolinate (CHROMIUM PICOLATE PO), Take 1 tablet by mouth daily, Disp: , Rfl:     DULoxetine (CYMBALTA) 30 mg delayed release capsule, take 1 capsule by mouth daily every morning take with food, Disp: 30 capsule, Rfl: 2    ezetimibe (ZETIA) 10 mg tablet, TAKE 1 TABLET BY MOUTH  DAILY, Disp: 90 tablet, Rfl: 3    famotidine (PEPCID) 20 mg tablet, Take 20 mg by mouth daily  , Disp: , Rfl:     gabapentin (NEURONTIN) 300 mg capsule, TAKE 2 CAPSULES BY MOUTH AT BEDTIME, Disp: 180 capsule, Rfl: 4    ipratropium (ATROVENT) 0 03 % nasal spray, 2 sprays into each nostril 3 (three) times a day Begin once per day, then progress to twice per day  Progress to three times a day as tolerated  , Disp: 90 mL, Rfl: 3    lidocaine (XYLOCAINE) 5 % ointment, Apply topically as needed for mild pain, Disp: 35 44 g, Rfl: 0    losartan (COZAAR) 50 mg tablet, Take 1 tablet (50 mg total) by mouth 2 (two) times a day, Disp: 180 tablet, Rfl: 1    metoprolol succinate (TOPROL-XL) 50 mg 24 hr tablet, 1 1/2 tab daily, Disp: 90 tablet, Rfl: 3    rOPINIRole (REQUIP) 0 25 mg tablet, TAKE 1 TABLET BY MOUTH  DAILY AT BEDTIME, Disp: 90 tablet, Rfl: 3    traMADol (ULTRAM) 50 mg tablet, Take 1 tablet (50 mg total) by mouth 2 (two) times a day as needed for moderate pain, Disp: 60 tablet, Rfl: 3    TURMERIC PO, Take 1 capsule by mouth 2 (two) times a day, Disp: , Rfl:     zolpidem (AMBIEN) 10 mg tablet, TAKE 1 TABLET BY MOUTH  DAILY AT BEDTIME AS NEEDED  FOR SLEEP, Disp: 90 tablet, Rfl: 1    Allergies   Allergen Reactions    Sotalol Other (See Comments)     Prolonged QTc leading to torsades de pointes     Penicillins Other (See Comments)     As a child calcium deposit in the arm     Ace Inhibitors GI Intolerance     Did feel well on it            Vitals:    03/29/22 0946   BP: (!) 172/76   Pulse: 64       Objective:                    Right Knee Exam     Tenderness   The patient is experiencing tenderness in the medial joint line and pes anserinus  Range of Motion   Extension: 0   Flexion: 120 (With crepitation and stiffness)     Other   Erythema: absent  Sensation: normal  Swelling: mild  Effusion: no effusion present    Comments:    Varus alignment      Left Knee Exam     Tenderness   The patient is experiencing tenderness in the pes anserinus  Range of Motion   Extension: 0   Flexion: 120 (Patella tracks well)     Other   Erythema: absent  Sensation: normal  Swelling: mild  Effusion: no effusion present    Comments:    Neutral alignment  Healed anterior incision  Intact extensor mechanism  She does have modest laxity with varus and valgus stressing both in extension as well as in slight flexion            Diagnostics, reviewed and taken today if performed as documented: The attending physician has personally reviewed the pertinent films in PACS and interpretation is as follows:    Right and left knee x-rays taken and reviewed in the office today show:  Left knee shows a well-appearing, balance, bonded total knee prosthesis without signs of loosening or fracture  Right knee shows advanced degenerative changes with bone-on-bone opposition medial and patellofemoral compartments  Varus alignment  Subchondral sclerosis and osteophyte formation present        Procedures, if performed today:    Large joint arthrocentesis: R knee  Universal Protocol:  Consent: Verbal consent obtained  Risks and benefits: risks, benefits and alternatives were discussed  Consent given by: patient  Time out: Immediately prior to procedure a "time out" was called to verify the correct patient, procedure, equipment, support staff and site/side marked as required  Timeout called at: 3/29/2022 10:25 AM   Patient understanding: patient states understanding of the procedure being performed  Site marked: the operative site was marked  Patient identity confirmed: verbally with patient    Supporting Documentation  Indications: pain and diagnostic evaluation   Procedure Details  Location: knee - R knee  Preparation: Patient was prepped and draped in the usual sterile fashion  Needle size: 22 G  Ultrasound guidance: no  Approach: anteromedial  Medications administered: 12 mg betamethasone acetate-betamethasone sodium phosphate 6 (3-3) mg/mL; 2 mL lidocaine 1 %; 2 mL bupivacaine 0 25 %    Patient tolerance: patient tolerated the procedure well with no immediate complications  Dressing:  Sterile dressing applied    Large joint arthrocentesis: bilateral pes anserine bursa  Universal Protocol:  Consent: Verbal consent obtained  Risks and benefits: risks, benefits and alternatives were discussed  Consent given by: patient  Time out: Immediately prior to procedure a "time out" was called to verify the correct patient, procedure, equipment, support staff and site/side marked as required    Timeout called at: 3/29/2022 10:26 AM   Patient understanding: patient states understanding of the procedure being performed  Site marked: the operative site was marked  Patient identity confirmed: verbally with patient    Supporting Documentation  Indications: pain and diagnostic evaluation   Procedure Details  Location: knee - bilateral pes anserine bursa  Preparation: Patient was prepped and draped in the usual sterile fashion  Needle size: 22 G  Ultrasound guidance: no  Approach: anteromedial    Medications (Right): 2 mL bupivacaine 0 25 %; 2 mL lidocaine 1 %; 12 mg betamethasone acetate-betamethasone sodium phosphate 6 (3-3) mg/mLMedications (Left): 2 mL bupivacaine 0 25 %; 2 mL lidocaine 1 %; 12 mg betamethasone acetate-betamethasone sodium phosphate 6 (3-3) mg/mL   Patient tolerance: patient tolerated the procedure well with no immediate complications  Dressing:  Sterile dressing applied            Portions of the record may have been created with voice recognition software  Occasional wrong word or "sound a like" substitutions may have occurred due to the inherent limitations of voice recognition software  Read the chart carefully and recognize, using context, where substitutions have occurred

## 2022-03-30 ENCOUNTER — TELEPHONE (OUTPATIENT)
Dept: CARDIOLOGY CLINIC | Facility: CLINIC | Age: 77
End: 2022-03-30

## 2022-03-30 NOTE — TELEPHONE ENCOUNTER
Results for orders placed or performed in visit on 03/30/22   Cardiac EP device report    Narrative    MDT-DUAL CHAMBER PPM (AAIR-DDDR MODE)/ ACTIVE SYSTEM IS MRI CONDITIONAL  NB/PT CONCERN-CARELINK TRANSMISSION: 1 AT/AF NOTED; PRESENTLY IN AF SINCE 8 AM TODAY  BATTERY STATUS "13 YRS " AP 0%  1 3%  ALL AVAILABLE LEAD PARAMETERS WITHIN NORMAL LIMITS   NC

## 2022-03-30 NOTE — TELEPHONE ENCOUNTER
P/c'd, states   Bp 173/66 took medication at 7:30 am  hjad cardioversion on 3/11/22          Please advise

## 2022-03-30 NOTE — TELEPHONE ENCOUNTER
I spoke with patient  She started to notice an increase her HR this AM and checked BP which was 173/66 with  bpm   Feels fatigued  More recently today vitals were 98 bpm and 153/70  I asked patient to send a transmission also which I will have device process  She denies any other symptoms  She had afib ablation 3/2021  DCCV 3/11/22    She is on Amiodarone 200mg daily and metoprolol 75mg daily  Please advise

## 2022-03-31 ENCOUNTER — HOSPITAL ENCOUNTER (OUTPATIENT)
Dept: RADIOLOGY | Facility: MEDICAL CENTER | Age: 77
Discharge: HOME | End: 2022-03-31
Payer: MEDICARE

## 2022-03-31 DIAGNOSIS — Z79.01 CURRENT USE OF LONG TERM ANTICOAGULATION: ICD-10-CM
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
## 2022-03-31 PROCEDURE — G1004 CDSM NDSC: HCPCS

## 2022-03-31 PROCEDURE — 70450 CT HEAD/BRAIN W/O DYE: CPT

## 2022-04-01 ENCOUNTER — HOSPITAL ENCOUNTER (EMERGENCY)
Facility: HOSPITAL | Age: 77
Discharge: HOME | End: 2022-04-01
Attending: EMERGENCY MEDICINE
Payer: MEDICARE

## 2022-04-01 ENCOUNTER — TELEPHONE (OUTPATIENT)
Dept: FAMILY MEDICINE CLINIC | Facility: CLINIC | Age: 77
End: 2022-04-01

## 2022-04-01 VITALS
SYSTOLIC BLOOD PRESSURE: 147 MMHG | TEMPERATURE: 97.8 F | OXYGEN SATURATION: 97 % | RESPIRATION RATE: 18 BRPM | HEART RATE: 94 BPM | DIASTOLIC BLOOD PRESSURE: 108 MMHG

## 2022-04-01 DIAGNOSIS — R07.9 CHEST PAIN: ICD-10-CM

## 2022-04-01 DIAGNOSIS — R06.00 DYSPNEA: ICD-10-CM

## 2022-04-01 DIAGNOSIS — R00.2 PALPITATIONS: ICD-10-CM

## 2022-04-01 DIAGNOSIS — E87.1 HYPONATREMIA: ICD-10-CM

## 2022-04-01 DIAGNOSIS — I48.91 ATRIAL FIBRILLATION (HCC): Primary | ICD-10-CM

## 2022-04-01 LAB
2HR DELTA HS TROPONIN: -1 NG/L
ALBUMIN SERPL BCP-MCNC: 3.9 G/DL (ref 3.5–5)
ALP SERPL-CCNC: 70 U/L (ref 46–116)
ALT SERPL W P-5'-P-CCNC: 28 U/L (ref 12–78)
ANION GAP SERPL CALCULATED.3IONS-SCNC: 8 MMOL/L (ref 4–13)
AST SERPL W P-5'-P-CCNC: 17 U/L (ref 5–45)
BASOPHILS # BLD AUTO: 0 THOUSANDS/ΜL (ref 0–0.1)
BASOPHILS NFR BLD AUTO: 0 % (ref 0–1)
BILIRUB SERPL-MCNC: 0.79 MG/DL (ref 0.2–1)
BUN SERPL-MCNC: 37 MG/DL (ref 5–25)
CALCIUM SERPL-MCNC: 9.1 MG/DL (ref 8.3–10.1)
CARDIAC TROPONIN I PNL SERPL HS: 6 NG/L
CARDIAC TROPONIN I PNL SERPL HS: 7 NG/L
CHLORIDE SERPL-SCNC: 97 MMOL/L (ref 100–108)
CO2 SERPL-SCNC: 26 MMOL/L (ref 21–32)
CREAT SERPL-MCNC: 1.22 MG/DL (ref 0.6–1.3)
D DIMER PPP FEU-MCNC: <0.27 UG/ML FEU
EOSINOPHIL # BLD AUTO: 0.01 THOUSAND/ΜL (ref 0–0.61)
EOSINOPHIL NFR BLD AUTO: 0 % (ref 0–6)
ERYTHROCYTE [DISTWIDTH] IN BLOOD BY AUTOMATED COUNT: 13.2 % (ref 11.6–15.1)
GFR SERPL CREATININE-BSD FRML MDRD: 42 ML/MIN/1.73SQ M
GLUCOSE SERPL-MCNC: 128 MG/DL (ref 65–140)
HCT VFR BLD AUTO: 46.2 % (ref 34.8–46.1)
HGB BLD-MCNC: 15.2 G/DL (ref 11.5–15.4)
IMM GRANULOCYTES # BLD AUTO: 0.04 THOUSAND/UL (ref 0–0.2)
IMM GRANULOCYTES NFR BLD AUTO: 0 % (ref 0–2)
LYMPHOCYTES # BLD AUTO: 1.44 THOUSANDS/ΜL (ref 0.6–4.47)
LYMPHOCYTES NFR BLD AUTO: 15 % (ref 14–44)
MCH RBC QN AUTO: 31.5 PG (ref 26.8–34.3)
MCHC RBC AUTO-ENTMCNC: 32.9 G/DL (ref 31.4–37.4)
MCV RBC AUTO: 96 FL (ref 82–98)
MONOCYTES # BLD AUTO: 1 THOUSAND/ΜL (ref 0.17–1.22)
MONOCYTES NFR BLD AUTO: 10 % (ref 4–12)
NEUTROPHILS # BLD AUTO: 7.2 THOUSANDS/ΜL (ref 1.85–7.62)
NEUTS SEG NFR BLD AUTO: 75 % (ref 43–75)
NRBC BLD AUTO-RTO: 0 /100 WBCS
PLATELET # BLD AUTO: 324 THOUSANDS/UL (ref 149–390)
PMV BLD AUTO: 9.8 FL (ref 8.9–12.7)
POTASSIUM SERPL-SCNC: 4.6 MMOL/L (ref 3.5–5.3)
PROT SERPL-MCNC: 7.9 G/DL (ref 6.4–8.2)
RBC # BLD AUTO: 4.82 MILLION/UL (ref 3.81–5.12)
SODIUM SERPL-SCNC: 131 MMOL/L (ref 136–145)
TSH SERPL DL<=0.05 MIU/L-ACNC: 0.67 UIU/ML (ref 0.36–3.74)
WBC # BLD AUTO: 9.69 THOUSAND/UL (ref 4.31–10.16)

## 2022-04-01 PROCEDURE — 99285 EMERGENCY DEPT VISIT HI MDM: CPT | Performed by: EMERGENCY MEDICINE

## 2022-04-01 PROCEDURE — 36415 COLL VENOUS BLD VENIPUNCTURE: CPT

## 2022-04-01 PROCEDURE — 99285 EMERGENCY DEPT VISIT HI MDM: CPT

## 2022-04-01 PROCEDURE — 84484 ASSAY OF TROPONIN QUANT: CPT

## 2022-04-01 PROCEDURE — 85379 FIBRIN DEGRADATION QUANT: CPT

## 2022-04-01 PROCEDURE — 93005 ELECTROCARDIOGRAM TRACING: CPT

## 2022-04-01 PROCEDURE — 84443 ASSAY THYROID STIM HORMONE: CPT

## 2022-04-01 PROCEDURE — 80053 COMPREHEN METABOLIC PANEL: CPT

## 2022-04-01 PROCEDURE — 85025 COMPLETE CBC W/AUTO DIFF WBC: CPT

## 2022-04-01 RX ORDER — METOPROLOL SUCCINATE 25 MG/1
100 TABLET, EXTENDED RELEASE ORAL DAILY
Qty: 120 TABLET | Refills: 0 | Status: SHIPPED | OUTPATIENT
Start: 2022-04-01 | End: 2022-04-29 | Stop reason: SDUPTHER

## 2022-04-01 NOTE — TELEPHONE ENCOUNTER
Spoke with patient  She had left several messages for Cardiology with no return phone call  She told me she was in A Fib  She was instructed to go to ED for evaluation

## 2022-04-01 NOTE — ED PROVIDER NOTES
History  Chief Complaint   Patient presents with    Rapid Heart Rate      rapid heart rate since wednesday with sob   + thinner for afib  Recent cardioversion 3/12/2022     Beverley Do is a 78-year-old female with a PMHx of AFib s/p cardioversion into normal sinus rhythm on 3/11 currently on amiodarone, Eliquis, metoprolol presenting to the ED due to rapid heart rate, palpitations, substernal chest pressure, dyspnea which started on 3/30/22  Patient states the symptoms are constant throughout the day  Also reports exertional substernal chest pressure and shortness of breath which resolves within a few minutes of rest   Patient states she recently received bilateral cortisone knee injections from her orthopedist and prior to receiving the treatment, she stated she had left leg pain which has since resolved  Denies nausea, vomiting, presyncopal or syncopal episodes, vision changes, abdominal pain, urinary symptoms  Prior to Admission Medications   Prescriptions Last Dose Informant Patient Reported? Taking?    Cholecalciferol ([employer] VITAMIN D) 2000 UNITS CAPS 4/1/2022 at Unknown time Self Yes Yes   Sig: Take 2,000 Units by mouth 2 (two) times a day     Chromium Picolinate (CHROMIUM PICOLATE PO) 4/1/2022 at Unknown time Self Yes Yes   Sig: Take 1 tablet by mouth daily   DULoxetine (CYMBALTA) 30 mg delayed release capsule 4/1/2022 at Unknown time Self No Yes   Sig: take 1 capsule by mouth daily every morning take with food   TURMERIC PO 4/1/2022 at Unknown time Self Yes Yes   Sig: Take 1 capsule by mouth 2 (two) times a day   acetaminophen (TYLENOL) 650 mg CR tablet Unknown at Unknown time Self No No   Sig: Take 1 tablet (650 mg total) by mouth every 6 (six) hours as needed for mild pain or moderate pain   Patient taking differently: Take 300 mg by mouth 2 (two) times a day     amiodarone 200 mg tablet 4/1/2022 at Unknown time Self No Yes   Sig: Take 1 tablet (200 mg total) by mouth daily   apixaban (ELIQUIS) 5 mg 2022 at Unknown time Self No Yes   Sig: Take 1 tablet (5 mg total) by mouth 2 (two) times a day   ascorbic acid (VITAMIN C) 500 mg tablet 2022 at Unknown time Self Yes Yes   Sig: Take 500 mg by mouth daily    ezetimibe (ZETIA) 10 mg tablet 2022 at Unknown time Self No Yes   Sig: TAKE 1 TABLET BY MOUTH  DAILY   famotidine (PEPCID) 20 mg tablet 2022 at Unknown time Self Yes Yes   Sig: Take 20 mg by mouth daily     gabapentin (NEURONTIN) 300 mg capsule 3/31/2022 at Unknown time Self No Yes   Sig: TAKE 2 CAPSULES BY MOUTH AT BEDTIME   ipratropium (ATROVENT) 0 03 % nasal spray Unknown at Unknown time Self No No   Si sprays into each nostril 3 (three) times a day Begin once per day, then progress to twice per day  Progress to three times a day as tolerated     lidocaine (XYLOCAINE) 5 % ointment Not Taking at Unknown time Self No No   Sig: Apply topically as needed for mild pain   Patient not taking: Reported on 2022    losartan (COZAAR) 50 mg tablet 2022 at Unknown time Self No Yes   Sig: Take 1 tablet (50 mg total) by mouth 2 (two) times a day   rOPINIRole (REQUIP) 0 25 mg tablet 3/31/2022 at Unknown time Self No Yes   Sig: TAKE 1 TABLET BY MOUTH  DAILY AT BEDTIME   traMADol (ULTRAM) 50 mg tablet 2022 at Unknown time Self No Yes   Sig: Take 1 tablet (50 mg total) by mouth 2 (two) times a day as needed for moderate pain   zolpidem (AMBIEN) 10 mg tablet 3/31/2022 at Unknown time Self No Yes   Sig: TAKE 1 TABLET BY MOUTH  DAILY AT BEDTIME AS NEEDED  FOR SLEEP      Facility-Administered Medications: None       Past Medical History:   Diagnosis Date    Actinic keratosis     last assessed - 2014    Arthritis     Atrial fibrillation with rapid ventricular response (HCC)     last assessed - 2016    Basal cell carcinoma     Benign colon polyp     last assessed - 2015    Disease of thyroid gland     Effusion of knee joint right     Resolved - [id number]    Esophageal reflux     Fibromyalgia     last assessed - [id number]    Fibromyalgia, primary     GERD (gastroesophageal reflux disease)     Hypertension     Palpitations     last assessed - [id number]    Peroneal tendonitis, right     last assessed - [id number]    Right lumbar radiculopathy 3/17/2016    Thyroid cancer ([address] )     Thyroid nodule     Trochanteric bursitis of left hip 3/9/2018       Past Surgical History:   Procedure Laterality Date    CATARACT EXTRACTION Bilateral     COLONOSCOPY  03/2018    EYE SURGERY      HYSTERECTOMY      JOINT REPLACEMENT Left     knee    CA REVISE MEDIAN N/CARPAL TUNNEL SURG Right 11/14/2019    Procedure: RELEASE CARPAL TUNNEL;  Surgeon: Lance Trejo MD;  [facility];  Service: Orthopedics    CA THYROID LOBECTOMY,UNILAT Left 12/16/2020    Procedure: Left THYROID LOBECTOMY;  Surgeon: Noelle Hendrix MD;  [facility];  Service: ENT    RECTAL POLYPECTOMY      REPLACEMENT TOTAL KNEE Left     last assessed - [id number]    TONSILLECTOMY      TOTAL ABDOMINAL HYSTERECTOMY      TUBAL LIGATION      US GUIDED THYROID BIOPSY  10/14/2020       Family History   Problem Relation Age of Onset    Heart disease Mother     Diabetes Mother     Heart disease Father     Coronary artery disease Father     Stroke Father         cerebrovascular accident    Heart attack Father         myocardial infarction    Sudden death Father         scd    Other Family         Back disorder    Coronary artery disease Family     Neuropathy Family     Osteoporosis Family     No Known Problems Daughter     No Known Problems Maternal Grandmother     No Known Problems Maternal Grandfather     No Known Problems Paternal Grandmother     No Known Problems Paternal Grandfather     Cancer Maternal Uncle     Breast cancer Maternal Aunt 72    No Known Problems Son     No Known Problems Maternal Aunt     No Known Problems Maternal Aunt     No Known Problems Maternal Aunt     No Known Problems Paternal Aunt     No Known Problems Paternal Aunt     Anuerysm Neg Hx     Clotting disorder Neg Hx     Arrhythmia Neg Hx     Heart failure Neg Hx      I have reviewed and agree with the history as documented  E-Cigarette/Vaping    E-Cigarette Use Never User      E-Cigarette/Vaping Substances    Nicotine No     THC No     CBD No     Flavoring No     Other No     Unknown No      Social History     Tobacco Use    Smoking status: Never Smoker    Smokeless tobacco: Never Used   Vaping Use    Vaping Use: Never used   Substance Use Topics    Alcohol use: Not Currently     Comment: occosional - every 3 months    Drug use: Never        Review of Systems   Constitutional: Negative for activity change, appetite change, chills and fever  HENT: Negative for ear pain, postnasal drip, rhinorrhea and sore throat  Eyes: Negative for pain and visual disturbance  Respiratory: Positive for shortness of breath  Negative for cough  Cardiovascular: Positive for chest pain and palpitations  Gastrointestinal: Negative for abdominal pain, constipation, diarrhea, nausea and vomiting  Genitourinary: Negative for dysuria, frequency, hematuria and urgency  Musculoskeletal: Negative for arthralgias and back pain  Skin: Negative for color change and rash  Neurological: Negative for seizures and syncope  All other systems reviewed and are negative        Physical Exam  ED Triage Vitals   Temperature Pulse Respirations Blood Pressure SpO2   04/01/22 1715 04/01/22 1649 04/01/22 1649 04/01/22 1649 04/01/22 1649   97 8 °F (36 6 °C) 97 18 (!) 188/83 97 %      Temp Source Heart Rate Source Patient Position - Orthostatic VS BP Location FiO2 (%)   04/01/22 1715 04/01/22 1649 04/01/22 1649 04/01/22 1649 --   Oral Monitor Sitting Left arm       Pain Score       04/01/22 1915       No Pain             Orthostatic Vital Signs  Vitals:    04/01/22 1649 04/01/22 1700 04/01/22 1915   BP: (!) 188/83 (!) 153/105 (!) 147/108 Pulse: 97 (!) 114 94   Patient Position - Orthostatic VS: Sitting Sitting Sitting       Physical Exam  Vitals and nursing note reviewed  Constitutional:       General: She is not in acute distress  Appearance: She is well-developed  Comments: Patient resting comfortably in bed, no acute distress, speaking in full sentences  HENT:      Head: Normocephalic and atraumatic  Mouth/Throat:      Mouth: Mucous membranes are moist    Eyes:      Conjunctiva/sclera: Conjunctivae normal       Pupils: Pupils are equal, round, and reactive to light  Neck:      Comments: No JVD, no accessory muscle usage  Cardiovascular:      Rate and Rhythm: Tachycardia present  Rhythm irregularly irregular  Pulses:           Radial pulses are 2+ on the right side and 2+ on the left side  Dorsalis pedis pulses are 2+ on the right side and 2+ on the left side  Heart sounds: Normal heart sounds  No murmur heard  Pulmonary:      Effort: Pulmonary effort is normal  No respiratory distress  Breath sounds: Normal breath sounds and air entry  Chest:      Comments: Nontender chest wall  Abdominal:      Palpations: Abdomen is soft  Tenderness: There is no abdominal tenderness  Comments: Ab soft nontender non peritoneal   Musculoskeletal:      Cervical back: Neck supple  Right lower leg: No edema  Left lower leg: No edema  Skin:     General: Skin is warm and dry  Capillary Refill: Capillary refill takes less than 2 seconds  Comments: No diaphoresis, cyanosis, pallor, jaundice  Neurological:      General: No focal deficit present  Mental Status: She is alert           ED Medications  Medications - No data to display    Diagnostic Studies  Results Reviewed     Procedure Component Value Units Date/Time    HS Troponin I 2hr [[id number]]  (Normal) Collected: 04/01/22 1918    Lab Status: Final result Specimen: Blood from Arm, Right Updated: 04/01/22 1956     hs TnI 2hr 6 ng/L      Delta 2hr hsTnI -1 ng/L     TSH, 3rd generation with Free T4 reflex [[id number]]  (Normal) Collected: 04/01/22 1720    Lab Status: Final result Specimen: Blood from Arm, Right Updated: 04/01/22 1753     TSH 3RD GENERATON 0 672 uIU/mL     Narrative:      Patients undergoing fluorescein dye angiography may retain small amounts of fluorescein in the body for 48-72 hours post procedure  Samples containing fluorescein can produce falsely depressed TSH values  If the patient had this procedure,a specimen should be resubmitted post fluorescein clearance        HS Troponin 0hr (reflex protocol) [[id number]]  (Normal) Collected: 04/01/22 1720    Lab Status: Final result Specimen: Blood from Arm, Right Updated: 04/01/22 1750     hs TnI 0hr 7 ng/L     Comprehensive metabolic panel [[id number]]  (Abnormal) Collected: 04/01/22 1720    Lab Status: Final result Specimen: Blood from Arm, Right Updated: 04/01/22 1743     Sodium 131 mmol/L      Potassium 4 6 mmol/L      Chloride 97 mmol/L      CO2 26 mmol/L      ANION GAP 8 mmol/L      BUN 37 mg/dL      Creatinine 1 22 mg/dL      Glucose 128 mg/dL      Calcium 9 1 mg/dL      AST 17 U/L      ALT 28 U/L      Alkaline Phosphatase 70 U/L      Total Protein 7 9 g/dL      Albumin 3 9 g/dL      Total Bilirubin 0 79 mg/dL      eGFR 42 ml/min/1 73sq m     Narrative:      [place] guidelines for Chronic Kidney Disease (CKD):     Stage 1 with normal or high GFR (GFR > 90 mL/min/1 73 square meters)    Stage 2 Mild CKD (GFR = 60-89 mL/min/1 73 square meters)    Stage 3A Moderate CKD (GFR = 45-59 mL/min/1 73 square meters)    Stage 3B Moderate CKD (GFR = 30-44 mL/min/1 73 square meters)    Stage 4 Severe CKD (GFR = 15-29 mL/min/1 73 square meters)    Stage 5 End Stage CKD (GFR <15 mL/min/1 73 square meters)  Note: GFR calculation is accurate only with a steady state creatinine    D-Dimer [[id number]]  (Normal) Collected: 04/01/22 1720    Lab Status: Final result Specimen: Blood from Arm, Right Updated: 04/01/22 1742     D-Dimer, Quant <0 27 ug/ml FEU     Narrative: In the evaluation for possible pulmonary embolism, in the appropriate (Well's Score of 4 or less) patient, the age adjusted d-dimer cutoff for this patient can be calculated as:    Age x 0 01 (in ug/mL) for Age-adjusted D-dimer exclusion threshold for a patient over 50 years  CBC and differential [[id number]]  (Abnormal) Collected: 04/01/22 1720    Lab Status: Final result Specimen: Blood from Arm, Right Updated: 04/01/22 1727     WBC 9 69 Thousand/uL      RBC 4 82 Million/uL      Hemoglobin 15 2 g/dL      Hematocrit 46 2 %      MCV 96 fL      MCH 31 5 pg      MCHC 32 9 g/dL      RDW 13 2 %      MPV 9 8 fL      Platelets 532 Thousands/uL      nRBC 0 /100 WBCs      Neutrophils Relative 75 %      Immat GRANS % 0 %      Lymphocytes Relative 15 %      Monocytes Relative 10 %      Eosinophils Relative 0 %      Basophils Relative 0 %      Neutrophils Absolute 7 20 Thousands/µL      Immature Grans Absolute 0 04 Thousand/uL      Lymphocytes Absolute 1 44 Thousands/µL      Monocytes Absolute 1 00 Thousand/µL      Eosinophils Absolute 0 01 Thousand/µL      Basophils Absolute 0 00 Thousands/µL                  No orders to display         Procedures  ECG 12 Lead Documentation Only    Date/Time: 4/1/2022 5:39 PM  Performed by: Alma Olmos MD  Authorized by: Alma Olmos MD     Indications / Diagnosis:  Chest pressure, rapid heart rate    Patient location:  ED  Previous ECG:     Previous ECG:  Compared to current    Comparison ECG info:  3/11/2022    Similarity:  Changes noted (A fib has replaced electronic atrial pacemaker, ventricular rate increased by 30 7p p m , nonspecific T-wave abnormality now evident in lateral leads)  Interpretation:     Interpretation: abnormal    Rate:     ECG rate:  99    ECG rate assessment: normal    Rhythm:     Rhythm: atrial fibrillation    Ectopy:     Ectopy: PVCs QRS:     QRS axis:  Normal    QRS intervals:  Normal  ST segments:     ST segments:  Normal  T waves:     T waves: non-specific and inverted      Inverted:  III, aVF, V4, V5 and V6  ECG 12 Lead Documentation Only    Date/Time: 4/1/2022 6:48 PM  Performed by: Nikhil Ordoñez MD  Authorized by: Nikhil Ordoñez MD     Indications / Diagnosis:  Worsening CP  Patient location:  ED  Previous ECG:     Previous ECG:  Compared to current    Comparison ECG info:  4/1/22 at16:57    Similarity:  No change  Interpretation:     Interpretation: abnormal    Rate:     ECG rate:  92    ECG rate assessment: normal    Rhythm:     Rhythm: atrial fibrillation    Ectopy:     Ectopy: none    QRS:     QRS axis:  Normal    QRS intervals:  Normal  ST segments:     ST segments:  Normal  T waves:     T waves: non-specific and inverted      Inverted:  III  Comments:      Nonspecific T-wave abnormality in lateral leads          ED Course  ED Course as of 04/01/22 2013 Fri Apr 01, 2022   1731 CBC unremarkable   1743 D-Dimer, Quant: <0 27   1754 TSH 3RD GENERATON: 0 672  wnl   1757 hs TnI 0hr: 7  2 hour troponin needed   [address] Cardiology texted for medication recommendations for pt (Dr Katrina Medina)   1948 Patient re-evaluated, currently has no complaints, feeling well  1958 hs TnI 2hr: 6  Delta trop -1  HEART Risk Score      Most Recent Value   Heart Score Risk Calculator    History 1 Filed at: 04/01/2022 1932   ECG 0 Filed at: 04/01/2022 1932   Age 2 Filed at: 04/01/2022 1932   Risk Factors 1 Filed at: 04/01/2022 1932   Troponin 0 Filed at: 04/01/2022 1932   HEART Score 4 Filed at: 04/01/2022 1932                      SBIRT 22yo+      Most Recent Value   SBIRT (25 yo +)    In order to provide better care to our patients, we are screening all of our patients for alcohol and drug use  Would it be okay to ask you these screening questions? Yes Filed at: 04/01/2022 1705   Initial Alcohol Screen: US AUDIT-C     1   How often do you have a drink containing alcohol? 1 Filed at: 04/01/2022 1705   2  How many drinks containing alcohol do you have on a typical day you are drinking? 1 Filed at: 04/01/2022 1705   3a  Male UNDER 65: How often do you have five or more drinks on one occasion? 0 Filed at: 04/01/2022 1705   3b  FEMALE Any Age, or MALE 65+: How often do you have 4 or more drinks on one occassion? 0 Filed at: 04/01/2022 1705   Audit-C Score 2 Filed at: 04/01/2022 1705   TARA: How many times in the past year have you    Used an illegal drug or used a prescription medication for non-medical reasons? Never Filed at: 04/01/2022 1705          Wells' Criteria for PE      Most Recent Value   Wells' Criteria for PE    Clinical signs and symptoms of DVT 0 Filed at: 04/01/2022 1936   PE is primary diagnosis or equally likely 0 Filed at: 04/01/2022 1936   HR >100 1 5 Filed at: 04/01/2022 1936   Immobilization at least 3 days or Surgery in the previous 4 weeks 0 Filed at: 04/01/2022 1936   Previous, objectively diagnosed PE or DVT 0 Filed at: 04/01/2022 1936   Hemoptysis 0 Filed at: 04/01/2022 1936   Malignancy with treatment within 6 months or palliative 0 Filed at: 04/01/2022 1936   Joseph' Criteria Total 1 5 Filed at: 04/01/2022 1936            MDM  Number of Diagnoses or Management Options  Atrial fibrillation ([facility] [address] )  Chest pain  Dyspnea  Hyponatremia  Palpitations  Diagnosis management comments: This is a 57-year-old female s/p cardioversion on 03/11/2022 presenting to the ED due to rapid heart rate, palpitations, chest pain, shortness of breath  Workup for ACS, PE, anemia, hypothyroid, electrolyte abnormality  Bharat Nestle score=1 5, cannot use PERC due to age  D-dimer ordered as patient is low risk for PE especially on Eliquis, D-dimer negative  No CTA  Dr Diane Gonzalez (cardiology) was texted for medication recommendations for this patient    He recommended to increase her metoprolol XL dose from 75 mg to 100 mg daily and to the call the cardiologist's office on Monday  Patient was informed of this and is aware  Will represcribe metoprolol at appropriate dose  Initial troponin = 7   2 hour troponin =6   Heart score = 4  Patient is safe for discharge home with cardiology referral   Patient was given referral for Cardiology  Patient was also given new prescription for metoprolol 100 mg daily  Patient was told to take this dose until she is seen by cardiology  Was given warning signs and symptoms which would require prompt return to the ED, patient understood these directions         Amount and/or Complexity of Data Reviewed  Clinical lab tests: ordered and reviewed  Tests in the medicine section of CPT®: ordered and reviewed  Discussion of test results with the performing providers: yes  Decide to obtain previous medical records or to obtain history from someone other than the patient: yes  Review and summarize past medical records: yes  Discuss the patient with other providers: yes  Independent visualization of images, tracings, or specimens: yes    Risk of Complications, Morbidity, and/or Mortality  Presenting problems: moderate  Diagnostic procedures: moderate  Management options: moderate    Patient Progress  Patient progress: stable      Disposition  Final diagnoses:   Atrial fibrillation ([address] )   Hyponatremia   Palpitations   Chest pain   Dyspnea     Time reflects when diagnosis was documented in both MDM as applicable and the Disposition within this note     Time User Action Codes Description Comment    4/1/2022  7:37 PM Martine Carrow Add [I48 91] Atrial fibrillation ([address] )     4/1/2022  7:37 PM Martine Carrow Add [E87 1] Hyponatremia     4/1/2022  7:59 PM Martine Carrow Add [R00 2] Palpitations     4/1/2022  7:59 PM Martine Carrow Add [R07 9] Chest pain     4/1/2022  7:59 PM Martine Carrow Add [R06 00] Dyspnea       ED Disposition     ED Disposition Condition Date/Time Comment    Discharge Stable Fri Apr 1, 2022  7:59 PM [place] Officer Mario discharge to home/self care  Follow-up Information     Follow up With Specialties Details Why Contact Info Additional [facility] Schedule an appointment as soon as possible for a visit  Schedule appointment with Cardiology for follow-up on Monday [address] [facility] [phone] [facility] [address] [facility], [address] [place] [address], [address]          Discharge Medication List as of 4/1/2022  8:04 PM      START taking these medications    Details   metoprolol succinate (TOPROL-XL) 25 mg 24 hr tablet Take 4 tablets (100 mg total) by mouth daily, Starting Fri 4/1/2022, Until Sun 5/1/2022, Normal         CONTINUE these medications which have NOT CHANGED    Details   amiodarone 200 mg tablet Take 1 tablet (200 mg total) by mouth daily, Starting Wed 2/23/2022, Normal      apixaban (ELIQUIS) 5 mg Take 1 tablet (5 mg total) by mouth 2 (two) times a day, Starting Wed 2/16/2022, Sample      ascorbic acid (VITAMIN C) 500 mg tablet Take 500 mg by mouth daily , Historical Med      Cholecalciferol ([employer] VITAMIN D) 2000 UNITS CAPS Take 2,000 Units by mouth 2 (two) times a day  , Historical Med      Chromium Picolinate (CHROMIUM PICOLATE PO) Take 1 tablet by mouth daily, Historical Med      DULoxetine (CYMBALTA) 30 mg delayed release capsule take 1 capsule by mouth daily every morning take with food, Normal      ezetimibe (ZETIA) 10 mg tablet TAKE 1 TABLET BY MOUTH  DAILY, Normal      famotidine (PEPCID) 20 mg tablet Take 20 mg by mouth daily  , Historical Med      gabapentin (NEURONTIN) 300 mg capsule TAKE 2 CAPSULES BY MOUTH AT BEDTIME, Normal      losartan (COZAAR) 50 mg tablet Take 1 tablet (50 mg total) by mouth 2 (two) times a day, Starting Tue 2/15/2022, Until Mon 5/16/2022, Normal      rOPINIRole (REQUIP) 0 25 mg tablet TAKE 1 TABLET BY MOUTH  DAILY AT BEDTIME, Normal      traMADol (ULTRAM) 50 mg tablet Take 1 tablet (50 mg total) by mouth 2 (two) times a day as needed for moderate pain, Starting Fri 2/11/2022, Normal      TURMERIC PO Take 1 capsule by mouth 2 (two) times a day, Historical Med      zolpidem (AMBIEN) 10 mg tablet TAKE 1 TABLET BY MOUTH  DAILY AT BEDTIME AS NEEDED  FOR SLEEP, Normal      acetaminophen (TYLENOL) 650 mg CR tablet Take 1 tablet (650 mg total) by mouth every 6 (six) hours as needed for mild pain or moderate pain, Starting Wed 12/16/2020, No Print      ipratropium (ATROVENT) 0 03 % nasal spray 2 sprays into each nostril 3 (three) times a day Begin once per day, then progress to twice per day  Progress to three times a day as tolerated , Starting Tue 2/15/2022, Normal      lidocaine (XYLOCAINE) 5 % ointment Apply topically as needed for mild pain, Starting Mon 1/3/2022, Normal           No discharge procedures on file  PDMP Review       Value Time User    PDMP Reviewed  Yes 9/3/2021 10:01 AM Fred Vergara MD           ED Provider  Attending physically available and evaluated Katy Chavez  ELICIA managed the patient along with the ED Attending      Electronically Signed by         Linnea Han MD  04/01/22 2013

## 2022-04-01 NOTE — ED ATTENDING ATTESTATION
4/1/2022  Kevin RUBI, DO, saw and evaluated the patient  I have discussed the patient with the resident/non-physician practitioner and agree with the resident's/non-physician practitioner's findings, Plan of Care, and MDM as documented in the resident's/non-physician practitioner's note, except where noted  All available labs and Radiology studies were reviewed  I was present for key portions of any procedure(s) performed by the resident/non-physician practitioner and I was immediately available to provide assistance  At this point I agree with the current assessment done in the Emergency Department  I have conducted an independent evaluation of this patient a history and physical is as follows:    Patient is a 57-year-old female with a history of atrial fibrillation status post ablation and cardioversion who presents with palpitations  Patient states that her palpitations started on Wednesday, 03/30/2022  She has had persistent palpitations with intermittent episodes of chest pressure and shortness of breath  This is her 1st recurrence of symptoms since her last cardioversion and early March  She is currently on Eliquis as well as amiodarone and metoprolol  She had her metoprolol increased in March  Patient denies any other medication changes  She has been taking her medications as directed  On exam, patient is in no acute distress  Heart is irregularly irregular and tachycardic  Breath sounds normal   Abdomen is soft, nontender, nondistended  No calf tenderness or lower extremity edema  EKG reveals no evidence of acute ischemia  Delta troponin within normal limits  Do not suspect ACS  Labs are otherwise unremarkable  EKG and monitor reveals atrial fibrillation with occasional RVR  However rate is generally less than 110  Case was discussed with patient's cardiology group  Cardiology recommends increasing p o  Metoprolol and discharge home    Patient and family comfortable with this plan  Will follow up with Cardiology and return to ED if symptoms worsen  Portions of the above record have been created with voice recognition software  Occasional wrong word or "sound alike" substitutions may have occurred due to the inherent limitations of voice recognition software  Read the chart carefully and recognize, using context, where substitutions may have occurred        ED Course         Critical Care Time  Procedures

## 2022-04-01 NOTE — ED NOTES
Patient c/o 3/10 chest pain, single cardiac monitor strip printed and shown to provider, requested repeat EKG       Radha Dobson RN  04/01/22 [id number]

## 2022-04-03 LAB
ATRIAL RATE: 112 BPM
ATRIAL RATE: 288 BPM
P AXIS: 0 DEGREES
P AXIS: 225 DEGREES
PR INTERVAL: 0 MS
QRS AXIS: 83 DEGREES
QRS AXIS: 87 DEGREES
QRSD INTERVAL: 100 MS
QRSD INTERVAL: 98 MS
QT INTERVAL: 354 MS
QT INTERVAL: 364 MS
QTC INTERVAL: 450 MS
QTC INTERVAL: 455 MS
T WAVE AXIS: -10 DEGREES
T WAVE AXIS: -8 DEGREES
VENTRICULAR RATE: 92 BPM
VENTRICULAR RATE: 99 BPM

## 2022-04-03 PROCEDURE — 93010 ELECTROCARDIOGRAM REPORT: CPT | Performed by: INTERNAL MEDICINE

## 2022-04-04 ENCOUNTER — TELEPHONE (OUTPATIENT)
Dept: CARDIOLOGY CLINIC | Facility: CLINIC | Age: 77
End: 2022-04-04

## 2022-04-04 DIAGNOSIS — R93.0 ABNORMAL CT OF THE HEAD: Primary | ICD-10-CM

## 2022-04-04 NOTE — TELEPHONE ENCOUNTER
Pt called last week about being in Afib, and ended up going to the ED  She was evaluated there for the Afib with symptoms of rapid HR, shortness of breath, chest pressure  Her metoprolol dose was increased to 100 mg daily, and she is on amiodarone 200 mg daily, as well as Eliquis 5 mg BID  She feels like she is still in Afib, and has been very tired  She walks from one room to the next, and has to rest     On discharge, she was advised to call here for a F/U with Dr Sean David  1st available for Dr Sean David is later April in [place], and May here in [place]  Please advise re: her F/U  Thank you

## 2022-04-07 NOTE — TELEPHONE ENCOUNTER
Patient called today stating she is still in a-fib  Patient would like to know what to do next   Please advise, thank you

## 2022-04-08 NOTE — TELEPHONE ENCOUNTER
I called the pt-she was called by Dr Simone Mills was notified to take the amiodarone at 200 mg BID  She started this last night  Pt reports that today she was doing her activities and is feeling fatigued, this is not new-has been going on for about 2 weeks now  Breathing is a little heavy right now, same as it has been, especially when exerting herself  This AM, /70, HR 88  This PM, HR 90    148/101, HR 92

## 2022-04-11 NOTE — TELEPHONE ENCOUNTER
Spoke w/Ang - she said the MA's will call pt and ask her status - they will transfer back to us to r/s 4/13 EP appt  I am unable to forward this note to Ma's - I will give them a copy    Sabrina Hallmark

## 2022-04-12 NOTE — TELEPHONE ENCOUNTER
Spoke with patient  She is feeling a lot better on the amio  She is ok rescheduling to late April or early may  Patient was transferred to reschedule

## 2022-04-18 ENCOUNTER — OFFICE VISIT (OUTPATIENT)
Dept: CARDIOLOGY CLINIC | Facility: CLINIC | Age: 77
End: 2022-04-18
Payer: MEDICARE

## 2022-04-18 VITALS
OXYGEN SATURATION: 92 % | BODY MASS INDEX: 30.96 KG/M2 | SYSTOLIC BLOOD PRESSURE: 132 MMHG | DIASTOLIC BLOOD PRESSURE: 88 MMHG | HEART RATE: 90 BPM | RESPIRATION RATE: 18 BRPM | WEIGHT: 168.25 LBS | HEIGHT: 62 IN

## 2022-04-18 DIAGNOSIS — I48.0 PAROXYSMAL ATRIAL FIBRILLATION (HCC): Primary | ICD-10-CM

## 2022-04-18 DIAGNOSIS — N18.31 STAGE 3A CHRONIC KIDNEY DISEASE (HCC): ICD-10-CM

## 2022-04-18 DIAGNOSIS — I10 ESSENTIAL HYPERTENSION: ICD-10-CM

## 2022-04-18 DIAGNOSIS — Z95.0 S/P PLACEMENT OF CARDIAC PACEMAKER: ICD-10-CM

## 2022-04-18 DIAGNOSIS — E78.2 MIXED HYPERLIPIDEMIA: ICD-10-CM

## 2022-04-18 DIAGNOSIS — I50.32 CHRONIC DIASTOLIC CHF (CONGESTIVE HEART FAILURE) (HCC): ICD-10-CM

## 2022-04-18 PROCEDURE — 99214 OFFICE O/P EST MOD 30 MIN: CPT | Performed by: INTERNAL MEDICINE

## 2022-04-18 PROCEDURE — 93000 ELECTROCARDIOGRAM COMPLETE: CPT | Performed by: INTERNAL MEDICINE

## 2022-04-18 NOTE — TELEPHONE ENCOUNTER
Patient aware we will call her back to set up her procedure when new system ready either on May or June

## 2022-04-18 NOTE — PROGRESS NOTES
Cardiology Follow Up    Osvaldo Spatz  1945  [id number]  [address]    1  Paroxysmal atrial fibrillation (HCC)  POCT ECG    Echo complete w/ contrast if indicated   2  Essential hypertension  Echo complete w/ contrast if indicated   3  Chronic diastolic CHF (congestive heart failure) (HCC)  Echo complete w/ contrast if indicated   4  Mixed hyperlipidemia  Echo complete w/ contrast if indicated   5  S/P placement of cardiac pacemaker  Echo complete w/ contrast if indicated   6  Stage 3a chronic kidney disease ([address] )         Discussion/Summary:  She returns to the office back in atrial fibrillation  She has been on amiodarone but has had breakthrough  Will recommend EP consider another ablation  Blood pressure is well controlled  Lipids have been stable  She is due for an echocardiogram I will discuss with her following review of the echo  Continue full anticoagulation  Interval History:  Very 66 yo pleasant female with  paroxysmal atrial fibrillation, hypertension, hyperlipidemia presents for a follow-up visit  She continues to remain in sinus rhythm on sotalol  Overall she has been doing well still has some shortness of breath and is back into atrial fibrillation  She has been switched to amiodarone but had breakthrough again  Pacemaker has been protecting her from the slow events  This is likely what led to her VF during the 1st ablation  Rates have been stable  Atrial fibrillation has been present for the last few days  Denies any chest pain admits to mild shortness of breath and exertional palpitations  There has been no lower extremity edema, PND, orthopnea    Problem List     Generalized edema    Essential hypertension    Dyslipidemia    Sacroiliitis (HCC)    Acute pain of right knee    Allergic rhinitis    Fibromyalgia    Hyperlipidemia    Insomnia    Lumbar spondylosis    Lumbar stenosis    Osteoarthrosis, hand    Osteoarthritis of knee    Overview Signed 10/4/2018  5:41 PM by Johnny Soler MD     Laterality: Right         Osteopenia    Paroxysmal atrial fibrillation (HCC)    Peripheral neuropathy    Restless legs syndrome    Right lumbar radiculopathy    TMJ syndrome    Vitamin D deficiency    Effusion of right knee        Past Medical History:   Diagnosis Date    Actinic keratosis     last assessed - [id number]    Arthritis     Atrial fibrillation with rapid ventricular response ([facility] [address] )     last assessed - [id number]    Basal cell carcinoma     Benign colon polyp     last assessed - [id number]    Disease of thyroid gland     Effusion of knee joint right     Resolved - [id number]    Esophageal reflux     Fibromyalgia     last assessed - [id number]    Fibromyalgia, primary     GERD (gastroesophageal reflux disease)     Hypertension     Palpitations     last assessed - [id number]    Peroneal tendonitis, right     last assessed - [id number]    Right lumbar radiculopathy 3/17/2016    Thyroid cancer ([facility] [address] )     Thyroid nodule     Trochanteric bursitis of left hip 3/9/2018     Social History     Socioeconomic History    Marital status:       Spouse name: Not on file    Number of children: Not on file    Years of education: Not on file    Highest education level: Not on file   Occupational History    Not on file   Tobacco Use    Smoking status: Never Smoker    Smokeless tobacco: Never Used   Vaping Use    Vaping Use: Never used   Substance and Sexual Activity    Alcohol use: Not Currently     Comment: occosional - every 3 months    Drug use: Never    Sexual activity: Not Currently   Other Topics Concern    Not on file   Social History Narrative    Not on file     Social Determinants of Health     Financial Resource Strain: Not on file   Food Insecurity: Not on file   Transportation Needs: Not on file   Physical Activity: Not on file   Stress: Not on file   Social Connections: Not on file   Intimate Partner Violence: Not on file   Housing Stability: Not on file      Family History   Problem Relation Age of Onset    Heart disease Mother     Diabetes Mother     Heart disease Father     Coronary artery disease Father     Stroke Father         cerebrovascular accident    Heart attack Father         myocardial infarction    Sudden death Father         scd    Other Family         Back disorder    Coronary artery disease Family     Neuropathy Family     Osteoporosis Family     No Known Problems Daughter     No Known Problems Maternal Grandmother     No Known Problems Maternal Grandfather     No Known Problems Paternal Grandmother     No Known Problems Paternal Grandfather     Cancer Maternal Uncle     Breast cancer Maternal Aunt 72    No Known Problems Son     No Known Problems Maternal Aunt     No Known Problems Maternal Aunt     No Known Problems Maternal Aunt     No Known Problems Paternal Aunt     No Known Problems Paternal Aunt     Anuerysm Neg Hx     Clotting disorder Neg Hx     Arrhythmia Neg Hx     Heart failure Neg Hx      Past Surgical History:   Procedure Laterality Date    CATARACT EXTRACTION Bilateral     COLONOSCOPY  03/2018    EYE SURGERY      HYSTERECTOMY      JOINT REPLACEMENT Left     knee    SC REVISE MEDIAN N/CARPAL TUNNEL SURG Right 11/14/2019    Procedure: RELEASE CARPAL TUNNEL;  Surgeon: Lance Trejo MD;  [facility];  Service: Orthopedics    SC THYROID LOBECTOMY,UNILAT Left 12/16/2020    Procedure: Left THYROID LOBECTOMY;  Surgeon: Noelle Hendrix MD;  [facility];  Service: ENT    RECTAL POLYPECTOMY      REPLACEMENT TOTAL KNEE Left     last assessed - [id number]    TONSILLECTOMY      TOTAL ABDOMINAL HYSTERECTOMY      TUBAL LIGATION      US GUIDED THYROID BIOPSY  10/14/2020       Current Outpatient Medications:     acetaminophen (TYLENOL) 650 mg CR tablet, Take 1 tablet (650 mg total) by mouth every 6 (six) hours as needed for mild pain or moderate pain (Patient taking differently: Take 300 mg by mouth 2 (two) times a day  ), Disp: 30 tablet, Rfl: 0    amiodarone 200 mg tablet, Take 1 tablet (200 mg total) by mouth daily, Disp: 90 tablet, Rfl: 2    apixaban (ELIQUIS) 5 mg, Take 1 tablet (5 mg total) by mouth 2 (two) times a day, Disp: 56 tablet, Rfl: 0    ascorbic acid (VITAMIN C) 500 mg tablet, Take 500 mg by mouth daily , Disp: , Rfl:     Cholecalciferol ([employer] VITAMIN D) 2000 UNITS CAPS, Take 2,000 Units by mouth 2 (two) times a day  , Disp: , Rfl:     Chromium Picolinate (CHROMIUM PICOLATE PO), Take 1 tablet by mouth daily, Disp: , Rfl:     DULoxetine (CYMBALTA) 30 mg delayed release capsule, take 1 capsule by mouth daily every morning take with food, Disp: 30 capsule, Rfl: 2    ezetimibe (ZETIA) 10 mg tablet, TAKE 1 TABLET BY MOUTH  DAILY, Disp: 90 tablet, Rfl: 3    famotidine (PEPCID) 20 mg tablet, Take 20 mg by mouth every other day  , Disp: , Rfl:     gabapentin (NEURONTIN) 300 mg capsule, TAKE 2 CAPSULES BY MOUTH AT BEDTIME, Disp: 180 capsule, Rfl: 4    ipratropium (ATROVENT) 0 03 % nasal spray, 2 sprays into each nostril 3 (three) times a day Begin once per day, then progress to twice per day  Progress to three times a day as tolerated  , Disp: 90 mL, Rfl: 3    losartan (COZAAR) 50 mg tablet, Take 1 tablet (50 mg total) by mouth 2 (two) times a day, Disp: 180 tablet, Rfl: 1    metoprolol succinate (TOPROL-XL) 25 mg 24 hr tablet, Take 4 tablets (100 mg total) by mouth daily, Disp: 120 tablet, Rfl: 0    rOPINIRole (REQUIP) 0 25 mg tablet, TAKE 1 TABLET BY MOUTH  DAILY AT BEDTIME, Disp: 90 tablet, Rfl: 3    traMADol (ULTRAM) 50 mg tablet, Take 1 tablet (50 mg total) by mouth 2 (two) times a day as needed for moderate pain, Disp: 60 tablet, Rfl: 3    TURMERIC PO, Take 1 capsule by mouth 2 (two) times a day, Disp: , Rfl:     zolpidem (AMBIEN) 10 mg tablet, TAKE 1 TABLET BY MOUTH  DAILY AT BEDTIME AS NEEDED  FOR SLEEP, Disp: 90 tablet, Rfl: 1    lidocaine (XYLOCAINE) 5 % ointment, Apply topically as needed for mild pain (Patient not taking: Reported on 4/1/2022 ), Disp: 35 44 g, Rfl: 0  Allergies   Allergen Reactions    Sotalol Other (See Comments)     Prolonged QTc leading to torsades de pointes     Penicillins Other (See Comments)     As a child calcium deposit in the arm     Ace Inhibitors GI Intolerance     Did feel well on it       Labs:     Chemistry        Component Value Date/Time     05/06/2015 1116    K 4 6 04/01/2022 1720    K 4 8 05/06/2015 1116    CL 97 (L) 04/01/2022 1720    CL 99 (L) 05/06/2015 1116    CO2 26 04/01/2022 1720    CO2 28 05/06/2015 1116    BUN 37 (H) 04/01/2022 1720    BUN 19 05/06/2015 1116    CREATININE 1 22 04/01/2022 1720    CREATININE 0 97 05/06/2015 1116        Component Value Date/Time    CALCIUM 9 1 04/01/2022 1720    CALCIUM 9 0 05/06/2015 1116    ALKPHOS 70 04/01/2022 1720    ALKPHOS 60 05/06/2015 1116    AST 17 04/01/2022 1720    AST 28 05/06/2015 1116    ALT 28 04/01/2022 1720    ALT 34 05/06/2015 1116    BILITOT 0 78 05/06/2015 1116            Lab Results   Component Value Date    CHOL 205 05/06/2015    CHOL 195 07/10/2014     Lab Results   Component Value Date    HDL 66 04/01/2021    HDL 54 08/03/2020    HDL 57 11/25/2019     Lab Results   Component Value Date    LDLCALC 88 04/01/2021    LDLCALC 104 (H) 08/03/2020    LDLCALC 106 (H) 11/25/2019     Lab Results   Component Value Date    TRIG 112 04/01/2021    TRIG 121 08/03/2020    TRIG 106 11/25/2019     No results found for: CHOLHDL    Imaging: No results found  ECG:    Sinus bradycardia nonspecific T-wave changes     Review of Systems   Constitutional: Negative  HENT: Negative  Eyes: Negative  Cardiovascular: Negative  Negative for leg swelling  Respiratory: Negative  Endocrine: Negative  Hematologic/Lymphatic: Negative  Skin: Negative  Musculoskeletal: Negative  Gastrointestinal: Negative  Genitourinary: Negative  Neurological: Negative  Psychiatric/Behavioral: Negative  Vitals:    04/18/22 1032   BP: 132/88   Pulse: 90   Resp: 18   SpO2: 92%     Vitals:    04/18/22 1032   Weight: 76 3 kg (168 lb 4 oz)     Height: 5' 2" (157 5 cm)   Body mass index is 30 77 kg/m²  Physical Exam:  Vital signs reviewed  General:  Alert and cooperative, appears stated age, no acute distress  HEENT:  PERRLA, EOMI, no scleral icterus, no conjunctival pallor  Neck:  No lymphadenopathy, no thyromegaly, no carotid bruits, no elevated JVP  Heart:  Regular rate and rhythm, normal S1/S2, no S3/S4, no murmur, rubs or gallops  PMI nondisplaced  Lungs:  Clear to auscultation bilaterally, no wheezes rales or rhonchi  Abdomen:  Soft, non-tender, positive bowel sounds, no rebound or guarding,   no organomegaly   Extremities:  Normal range of motion    No clubbing, cyanosis or edema   Vascular:  2+ pedal pulses  Skin:  No rashes or lesions on exposed skin  Neurologic:  Cranial nerves II-XII grossly intact without focal deficits  Psych:  Normal mood and affect

## 2022-04-20 DIAGNOSIS — I48.0 PAROXYSMAL ATRIAL FIBRILLATION (HCC): ICD-10-CM

## 2022-04-26 ENCOUNTER — HOSPITAL ENCOUNTER (OUTPATIENT)
Dept: RADIOLOGY | Facility: HOSPITAL | Age: 77
Discharge: HOME | End: 2022-04-26
Payer: MEDICARE

## 2022-04-26 DIAGNOSIS — R93.0 ABNORMAL CT OF THE HEAD: ICD-10-CM

## 2022-04-26 PROCEDURE — A9585 GADOBUTROL INJECTION: HCPCS | Performed by: FAMILY MEDICINE

## 2022-04-26 PROCEDURE — G1004 CDSM NDSC: HCPCS

## 2022-04-26 PROCEDURE — 70553 MRI BRAIN STEM W/O & W/DYE: CPT

## 2022-04-26 RX ADMIN — GADOBUTROL 7 ML: 604.72 INJECTION INTRAVENOUS at 10:54

## 2022-04-26 NOTE — NURSING NOTE
Device interrogation for MRI  Normal device function prior to MRI  Leads and device meet all requirements per policy for MRI  Device programmed ODO per Cardiology for MRI  Patient has no complaints  Vital signs stable throughout  Normal device function post MRI  Device reprogrammed to prior settings per Cardiology

## 2022-04-26 NOTE — LETTER
[address] [place] [address]  [address] [facility] [address]      May 2, 2022    MRN: [id number]     Phone: [phone]     Dear Ms Myles Hernandez recently had a(n) MRI performed on 4/26/2022 at  [address] [place] [address] that was requested by Jessy Layne MD  The study was reviewed by a radiologist, which is a physician who specializes in medical imaging  The radiologist issued a report describing his or her findings  In that report there was a finding that the radiologist felt warranted further discussion with your health care provider and that discussion would be beneficial to you  The results were sent to Jessy Layne MD on 04/27/2022  2:15 PM  We recommend that you contact Jessy Layne MD at [phone] or set up an appointment to discuss the results of the imaging test  If you have already heard from Jessy Layne MD regarding the results of your study, you can disregard this letter  This letter is not meant to alarm you, but intended to encourage you to follow-up on your results with the provider that sent you for the imaging study  In addition, we have enclosed answers to frequently asked questions by other patients who have also received a letter to review results with their health care provider (see page two)  Thank you for choosing [address] [place] [address] for your medical imaging needs  FREQUENTLY ASKED QUESTIONS    1  Why am I receiving this letter? [facility] requires us to notify patients who have findings on imaging exams that may require more testing or follow-up with a health professional within the next 3 months          2  How serious is the finding on the imaging test?  This letter is sent to all patients who may need follow-up or more testing within the next 3 months  Receiving this letter does not necessarily mean you have a life-threatening imaging finding or disease  Recommendations in the radiologists imaging report are general in nature and it is up to your healthcare provider to say whether those recommendations make sense for your situation  You are strongly encouraged to talk to your health care provider about the results and ask whether additional steps need to be taken  3  Where can I get a copy of the final report for my recent radiology exam?  To get a full copy of the report you can access your records online at [email or web address] or please contact [address] [place] [address] Records Department at [phone] Monday through Friday between 8 am and 6 pm          4  What do I need to do now? Please contact your health care provider who requested the imaging study to discuss what further actions (if any) are needed  You may have already heard from (your ordering provider) in regard to this test in which case you can disregard this letter  NOTICE IN ACCORDANCE WITH THE [facility] PATIENT TEST RESULT INFORMATION ACT OF 2018    You are receiving this notice as a result of a determination by your diagnostic imaging service that further discussions of your test results are warranted and would be beneficial to you  The complete results of your test or tests have been or will be sent to the health care practitioner that ordered the test or tests  It is recommended that you contact your health care practitioner to discuss your results as soon as possible

## 2022-04-27 ENCOUNTER — TELEPHONE (OUTPATIENT)
Dept: FAMILY MEDICINE CLINIC | Facility: CLINIC | Age: 77
End: 2022-04-27

## 2022-04-27 ENCOUNTER — EVALUATION (OUTPATIENT)
Dept: PHYSICAL THERAPY | Facility: MEDICAL CENTER | Age: 77
End: 2022-04-27
Payer: MEDICARE
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
## 2022-04-27 DIAGNOSIS — T84.023D INSTABILITY OF INTERNAL LEFT KNEE PROSTHESIS, SUBSEQUENT ENCOUNTER: Primary | ICD-10-CM

## 2022-04-27 DIAGNOSIS — D32.9 MENINGIOMA (HCC): Primary | ICD-10-CM

## 2022-04-27 DIAGNOSIS — R26.9 GAIT DISTURBANCE: ICD-10-CM

## 2022-04-27 PROCEDURE — 97161 PT EVAL LOW COMPLEX 20 MIN: CPT | Performed by: PHYSICAL THERAPIST

## 2022-04-27 PROCEDURE — 97110 THERAPEUTIC EXERCISES: CPT | Performed by: PHYSICAL THERAPIST

## 2022-04-27 NOTE — TELEPHONE ENCOUNTER
Called patient to review results of MRI  Discussed findings with patient of meningioma in frontal area and recommended referral to neurosurgery  Referral placed  All questions answered

## 2022-04-27 NOTE — PROGRESS NOTES
PT Evaluation     Today's date: 2022  Patient name: Daxa Diaz  : 1945  MRN: [id number]  Referring provider: Sendy Cha MD  Dx:   Encounter Diagnosis     ICD-10-CM    1  Instability of internal left knee prosthesis, subsequent encounter  T84 023D    2  Fall, initial encounter  [address]  XXXA Ambulatory Referral to Physical Therapy   3  Gait disturbance  R26 9 Ambulatory Referral to Physical Therapy       Start Time:   Stop Time: 1013  Total time in clinic (min): 40 minutes    Assessment  Assessment details: Pt is a 67 y o female who presents to OP PT with referral due to falls and increased L knee pain following previous L TKA  Pt presents today with decreased LE strength, decreased balance, increased L knee pain and decreased activity tolerance and safety when performing functional activities  These impairments limit the patient from participating in gardening and house work, decreased tolerance to ambulating, and decreased ability to participate in the community  Pt also reports history of A-fib that affects her endurance as well  Pt reports that she is scheduled to have L knee revision on 2022 as well  Pt was educated about examination findings, how these deficits affect her gait and balance and the plan for PT moving forward  I believe this patient is a good candidate for and will benefit from skilled physical therapy for gait and balance training, LE strengthening exercises and manual therapy to the L knee in order to improve symptoms and assist the patient to improved functional ability  Thank you for the opportunity to participate in Farnaz's care      Positive Prognostic Indicators: desire to improve    Negative Prognostic Indicators: co-morbidities  Impairments: abnormal gait, abnormal or restricted ROM, abnormal movement, activity intolerance, impaired balance, impaired physical strength, lacks appropriate home exercise program and pain with function    Symptom irritability: lowUnderstanding of Dx/Px/POC: good   Prognosis: good    Goals  STGs: 4 weeks  1) Pt will have SPR decrease of 2 units at worst  2) pt will have improved L knee extension strength by 1/2 muscle grade  3) pt will have improved foto score of 10 points    LTGs: 8 weeks  1) pt will be independent with HEP by D/C  2) pt will be independent with symptom management by D/C  3) pt will demonstrate improvements in TUG time <14 seconds in order to demonstrate improved fall risk by DC  Plan  Patient would benefit from: skilled physical therapy  Planned modality interventions: cryotherapy and thermotherapy: hydrocollator packs  Planned therapy interventions: joint mobilization, manual therapy, neuromuscular re-education, patient education, strengthening, stretching, therapeutic activities, therapeutic exercise, home exercise program, gait training and functional ROM exercises  Frequency: 2x week  Duration in weeks: 12  Plan of Care beginning date: 4/27/2022  Plan of Care expiration date: 7/20/2022  Treatment plan discussed with: patient        Subjective Evaluation    History of Present Illness  Mechanism of injury: Subjective Comments: pt reports that she does not walk straight  She reports that walking form room to room she will walk into the doorways  She also reports making sharp turns will cause her to loose her balance  In march, pt reports that she was in the garden, pt reports that she was leaning forward over an embankment when she lost her balance forwards and rolled down embankment  Pt has had CT scan and MRI completed of head, she is waiting on MRI results  Pt also sees orthopedic surgeon for previously replaced L knee  Pt is planning on getting L knee revision in September  Pt denies N&T in legs  She does confirm neuropathy but notes at night the feet become tingling  Pt denies using AD      Pain   Rest: 0/10   Best: 0/10   Worst: 4-5/10    Relieving Factors: topical pain relief, ice, rest    Exacerbating Factors: weight bearing, increased activity, worse in the morning     Sleeping: increased difficulty, takes sleeping pill at night at baseline    Home Set-up: increased difficulty completing stairs  Following up with MD for A-fib  This also complicate stairs    ADLs: independent    Work/Hobbies: gardening and house work  Has to take breaks due to heart/ fatigue     Previous Treatment: previous PT following L knee replacement  nothing for falls to this point    Goals:  Wants to move around easier  Objective     Tenderness     Right Knee   Tenderness in the lateral joint line and medial joint line  Active Range of Motion   Left Knee   Flexion: 86 degrees   Extension: 0 degrees     Right Knee   Flexion: 101 degrees   Extension: 0 degrees     Passive Range of Motion   Left Knee   Flexion: 86 degrees   Extension: 0 degrees     Right Knee   Flexion: 109 degrees   Extension: 0 degrees     Strength/Myotome Testing     Left Hip   Planes of Motion   Flexion: 3+  Abduction: 4+  Adduction: 4+    Right Hip   Planes of Motion   Flexion: 4-  Abduction: 4+  Adduction: 4+    Left Knee   Flexion: 3+  Extension: 4+    Right Knee   Flexion: 3+  Extension: 4+    Left Ankle/Foot   Dorsiflexion: 4  Plantar flexion: 5    Right Ankle/Foot   Dorsiflexion: 4  Plantar flexion: 5    Ambulation     Ambulation: Stairs   Ascend stairs: independent  Pattern: reciprocal  Railings: two rails  Descend stairs: independent  Pattern: reciprocal  Railings: two rails    Observational Gait   Gait: asymmetric   Walking speed within functional limits  Decreased stride length  Additional Observational Gait Details  Deviations from a straight path  R knee lateral buckling observed      Functional Assessment        Comments  TU 37  5X STS: 17 94                 Precautions: A-fib, CHF, Hx HA, L TKA, CKD, fibromyalgia, RLS, pacemaker, tremors      Manuals 2022 Neuro Re-Ed             LAQ x10            Standing hip abd/ext x10 ea              SAQ             FT balance             Tandem stance/walking                                       Ther Ex             Heel raise x10            Side stepping             Mini squats              bike             Step ups                                                    Ther Activity                                       Gait Training                                       Modalities

## 2022-04-29 DIAGNOSIS — I48.91 ATRIAL FIBRILLATION (HCC): ICD-10-CM

## 2022-04-29 RX ORDER — METOPROLOL SUCCINATE 25 MG/1
100 TABLET, EXTENDED RELEASE ORAL DAILY
Qty: 360 TABLET | Refills: 1 | Status: SHIPPED | OUTPATIENT
Start: 2022-04-29 | End: 2022-05-16

## 2022-04-29 NOTE — TELEPHONE ENCOUNTER
Requested medication(s) are due for refill today: Yes  Patient has already received a courtesy refill: No  Other reason request has been forwarded to provider: drug contraindications

## 2022-05-04 ENCOUNTER — OFFICE VISIT (OUTPATIENT)
Dept: PHYSICAL THERAPY | Facility: MEDICAL CENTER | Age: 77
End: 2022-05-04
Payer: MEDICARE

## 2022-05-04 DIAGNOSIS — G47.01 INSOMNIA DUE TO MEDICAL CONDITION: ICD-10-CM

## 2022-05-04 DIAGNOSIS — T84.023D INSTABILITY OF INTERNAL LEFT KNEE PROSTHESIS, SUBSEQUENT ENCOUNTER: ICD-10-CM
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
## 2022-05-04 DIAGNOSIS — R26.9 GAIT DISTURBANCE: ICD-10-CM

## 2022-05-04 PROCEDURE — 97112 NEUROMUSCULAR REEDUCATION: CPT | Performed by: PHYSICAL THERAPIST

## 2022-05-04 PROCEDURE — 97110 THERAPEUTIC EXERCISES: CPT | Performed by: PHYSICAL THERAPIST

## 2022-05-04 NOTE — PROGRESS NOTES
Daily Note     Today's date: 2022  Patient name: Chandra Matthew  : 1945  MRN: [id number]  Referring provider: Chivo Mariano MD  Dx:   Encounter Diagnosis     ICD-10-CM    1  Fall, subsequent encounter  W19  XXXD    2  Gait disturbance  R26 9    3  Instability of internal left knee prosthesis, subsequent encounter  T84 023D        Start Time: 1100  Stop Time: 1145  Total time in clinic (min): 45 minutes    Subjective: pt reports that HEP is going ok  She continues to have intermittent knee discomfort  Objective: See treatment diary below      Assessment: Tolerated treatment well  Pt ocmpletd all exercises well with occassional instances of LOB that required UE support to prevent falls  Knee pain was well manaaged thoruhgout tretament session  HEP will be progressed NV if symptoms are well managed following treatment session  Patient would benefit from continued PT      Plan: Continue per plan of care  Precautions: A-fib, CHF, Hx HA, L TKA, CKD, fibromyalgia, RLS, pacemaker, tremors    Pt 1:1 from 2040-2235  Manuals 2022                                                               Neuro Re-Ed             LAQ x10 2x10 3"           Standing hip abd/ext x10 ea  2x10 ea  SAQ  2x10 3"           FT balance  FA EC foam 30"x3           Tandem stance/walking  stance 30"x2 ea  No foam           SLR  2x10           Foam marching  x20 ea  Ther Ex             Heel raise x10 x20           Side stepping  Foam 10 laps           Mini squats   LP #35 2x10           bike  5' slow           Step ups  4" x10 ea                                                    Ther Activity                                       Gait Training                                       Modalities

## 2022-05-04 NOTE — TELEPHONE ENCOUNTER
02/11/2022 09/30/2021 Zolpidem Tartrate (Tablet)  90 0 90 10 MG NA KATLIN RCIKS  [facility]  Medicare 00 / 1 PA

## 2022-05-05 RX ORDER — ZOLPIDEM TARTRATE 10 MG/1
10 TABLET ORAL
Qty: 90 TABLET | Refills: 1 | Status: SHIPPED | OUTPATIENT
Start: 2022-05-05

## 2022-05-06 ENCOUNTER — OFFICE VISIT (OUTPATIENT)
Dept: PHYSICAL THERAPY | Facility: MEDICAL CENTER | Age: 77
End: 2022-05-06
Payer: MEDICARE
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
## 2022-05-06 DIAGNOSIS — R26.9 GAIT DISTURBANCE: ICD-10-CM

## 2022-05-06 DIAGNOSIS — T84.023D INSTABILITY OF INTERNAL LEFT KNEE PROSTHESIS, SUBSEQUENT ENCOUNTER: ICD-10-CM
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
## 2022-05-06 PROCEDURE — 97112 NEUROMUSCULAR REEDUCATION: CPT | Performed by: PHYSICAL THERAPIST

## 2022-05-06 PROCEDURE — 97140 MANUAL THERAPY 1/> REGIONS: CPT | Performed by: PHYSICAL THERAPIST

## 2022-05-06 PROCEDURE — 97110 THERAPEUTIC EXERCISES: CPT | Performed by: PHYSICAL THERAPIST

## 2022-05-06 NOTE — PROGRESS NOTES
Daily Note     Today's date: 2022  Patient name: Chandra Matthew  : 1945  MRN: [id number]  Referring provider: Chivo Mariano MD  Dx:   Encounter Diagnosis     ICD-10-CM    1  Fall, subsequent encounter  W19  XXXD    2  Gait disturbance  R26 9    3  Instability of internal left knee prosthesis, subsequent encounter  T84 023D    4  Fall, initial encounter  [address]  XXXA        Start Time: 3721  Stop Time: 5972  Total time in clinic (min): 38 minutes    Subjective: pt reports that she had no complaints following LV  She reports she was completing yard work and has increased knee stiffness today  Objective: See treatment diary below      Assessment: Tolerated treatment well  Pt completed all exercises well with minor complaints of symptoms that was well managed with rest breaks  HEP progressed with standing LE strenghtening exercises  We will continue to progress as tolerated  Patient would benefit from continued PT      Plan: Continue per plan of care  Precautions: A-fib, CHF, Hx HA, L TKA, CKD, fibromyalgia, RLS, pacemaker, tremors    Pt 1:1 from   Manuals 2022 5/6          L knee PROM    RK                                                 Neuro Re-Ed             LAQ x10 2x10 3"           Standing hip abd/ext x10 ea  2x10 ea  2x10 ea  On foam          SAQ  2x10 3" 2x10 3" ea  FT balance  FA EC foam 30"x3 FA EC foam 30"x3          Tandem stance/walking  stance 30"x2 ea  No foam stance 30"x2 ea  No foam          SLR  2x10 2x10 ea  Foam marching  x20 ea  x20 ea  Ther Ex             Heel raise x10 x20 x20 on foam          Side stepping  Foam 10 laps Foam 10 laps          Mini squats   LP #35 2x10 LP #35 2x10          bike  5' slow 5' slow          Step ups  4" x10 ea   4" x10 ea           hurdles                                       Ther Activity                                       Gait Training Modalities

## 2022-05-09 ENCOUNTER — OFFICE VISIT (OUTPATIENT)
Dept: CARDIOLOGY CLINIC | Facility: CLINIC | Age: 77
End: 2022-05-09
Payer: MEDICARE

## 2022-05-09 VITALS
SYSTOLIC BLOOD PRESSURE: 142 MMHG | WEIGHT: 169.1 LBS | BODY MASS INDEX: 31.12 KG/M2 | DIASTOLIC BLOOD PRESSURE: 82 MMHG | HEART RATE: 90 BPM | HEIGHT: 62 IN

## 2022-05-09 DIAGNOSIS — I48.0 PAROXYSMAL ATRIAL FIBRILLATION (HCC): Primary | ICD-10-CM

## 2022-05-09 PROCEDURE — 93000 ELECTROCARDIOGRAM COMPLETE: CPT | Performed by: PHYSICIAN ASSISTANT

## 2022-05-09 PROCEDURE — 99214 OFFICE O/P EST MOD 30 MIN: CPT | Performed by: PHYSICIAN ASSISTANT

## 2022-05-09 NOTE — PROGRESS NOTES
Electrophysiology Office Note    Jesus Colin  1945  [id number]  [facility] [address] [phone]        Assessment/Plan     Primary diagnosis:   1  Persistent atrial fibrillation, symptomatic               * patient presents to B EP office today for a sick visit  She is a patient of Dr Warren Nair   * ECG today is showing rate controlled atrial fibrillation ;   * flecainide stopped for amiodarone in Febaury 2022, cardioversion successful but she returned to AF in March    *  pacemaker showing persistent AF since March 2022 with AVG HR of 100bpm but max HR's into the 150's daily  * was on Sotalol however due to prolonged QTc and torsades at beginning of her AF Ablation this medication was discontinued indefinitely    * she is symptomatic with exertional fatigue in atrial fibrillation  Discussion was had with Dr Warren Nair at last visit in March about repeat ablation  However, patient has recent diagnosis of Meningioma  Was diagnosed after being worked up for new onset headaches in past 5-6 months  In the past 2-3 months having balance/gait issues  We did not change any medications today, will wait to see what the NSX plan is  If no intervention being planned will discuss repeat ablation with Dr Warren Nair  * I will call daughter and patient next Monday after I review NSX note  A  If NSX being performed will discuss stopping amiodarone and increasing BB to 100mg BID with device checks for rate control  Can plan ablation once completely stable from [address] [place] [address] standpoint  B  If no NSX intervention being planned consider afib ablation + continue amiodarone to allow higher chance of maintaining NSR post procedure + increase BB for more rate control in the interim   *  She would be excellent candidate for ablation she is in great shape for 76 ; I question if her gait symptoms are more from amiodarone then menigioma       2  Prolonged QTc on sotalol with torsades s/p DCCV during ablation   3  Chronic HFpEF   4  HTN  5  HLD   6  Meningioma - see above                Rhythm History:   Atrial fibrillation:   1  Sotalol initiation -    2  QTc prolongation leading to torsades at beginning of AF ablation - sotalol discontinued indefinitely - 3-2021  3  Flecainide initiation - 3/2021  4  S/p atrial fibrillation ablation by Dr Kamilah Patel - 3/2021  5  Reverted back to Afib despite flecainide s/p DCCV - 2022  5  Reverted back to atrial fibrillation - started on amiodarone by Dr Anamika Christensen- 3/2022 - DCCV successful  6  Reverted back to atrial fibrillation - 2022     Atrial flutter:     SVT:     VT/VF:     Device history:   Pacemaker:  1  TBS s/p MDT DC PPM by Dr Aiken Unruly -     Defibrillator:    BIV PPM:    BIV ICD:    ILR:      Cardiac Testing:     ECHO: Results for orders placed during the hospital encounter of 21    Echo complete with contrast if indicated    Narrative  [place] [address], [address] [place] [address]  [phone]    Transthoracic Echocardiogram  2D, M-mode, Doppler, and Color Doppler    Study date:  2021    Patient: Britta Becerril  MR number: [id number]  Account number: [de-identified]  : 1945  Age: 67 years  Gender: Female  Status: Inpatient  Location: Bedside  Height: 62 in  Weight: 151 6 lb  BP: 126/ 94 mmHg    Indications: Atrial fibrillation    Diagnoses: I48 0 - Atrial fibrillation    Sonographer:  IGNACIO Castaneda  Primary Physician:  Veronica Edge MD  Referring Physician:  Tara Chiu MD  Group:  [facility]  Interpreting Physician:  Ashwini De Leon MD    SUMMARY    LEFT VENTRICLE:  Systolic function was normal  Ejection fraction was estimated to be 55 %  There were no regional wall motion abnormalities  Wall thickness was at the upper limits of normal   Doppler parameters were consistent with elevated ventricular end-diastolic filling pressure      LEFT ATRIUM:  The atrium was mildly to moderately dilated  RIGHT ATRIUM:  The atrium was mildly dilated  MITRAL VALVE:  There was mild stenosis  TRICUSPID VALVE:  There was mild to moderate regurgitation  Pulmonary artery systolic pressure was mildly increased  HISTORY: PRIOR HISTORY: HLD, HTN, PAF, chronic CHF, thyroid nodule    PROCEDURE: The procedure was performed at the bedside  This was a routine study  The transthoracic approach was used  The study included complete 2D imaging, M-mode, complete spectral Doppler, and color Doppler  The heart rate was 101 bpm,  at the start of the study  Images were obtained from the parasternal, apical, subcostal, and suprasternal notch acoustic windows  Echocardiographic views were limited due to lung interference  This was a technically difficult study  LEFT VENTRICLE: Size was normal  Systolic function was normal  Ejection fraction was estimated to be 55 %  There were no regional wall motion abnormalities  Wall thickness was at the upper limits of normal  DOPPLER: Transmitral flow  pattern: atrial fibrillation  Left ventricular diastolic function parameters were abnormal  Doppler parameters were consistent with elevated ventricular end-diastolic filling pressure  RIGHT VENTRICLE: The size was normal  Systolic function was normal  Wall thickness was normal     LEFT ATRIUM: The atrium was mildly to moderately dilated  RIGHT ATRIUM: The atrium was mildly dilated  MITRAL VALVE: Valve structure was normal  There was normal leaflet separation  DOPPLER: The transmitral velocity was within the normal range  There was mild stenosis  There was no significant regurgitation  AORTIC VALVE: The valve was trileaflet  Leaflets exhibited normal thickness and normal cuspal separation  DOPPLER: Transaortic velocity was within the normal range  There was no evidence for stenosis  There was no significant  regurgitation      TRICUSPID VALVE: The valve structure was normal  There was normal leaflet separation  DOPPLER: The transtricuspid velocity was within the normal range  There was no evidence for stenosis  There was mild to moderate regurgitation  Pulmonary  artery systolic pressure was mildly increased  PULMONIC VALVE: Leaflets exhibited normal thickness, no calcification, and normal cuspal separation  DOPPLER: The transpulmonic velocity was within the normal range  There was no significant regurgitation  PERICARDIUM: There was no pericardial effusion  The pericardium was normal in appearance  AORTA: The root exhibited normal size  SYSTEMIC VEINS: IVC: The inferior vena cava was normal in size  PULMONARY VEINS: DOPPLER: Doppler signals were not recordable in the pulmonary vein(s)  SYSTEM MEASUREMENT TABLES    2D  %FS: 37 15 %  Ao Diam: 2 74 cm  Ao asc: 2 72 cm  EDV(Teich): 103 17 ml  EF(Teich): 67 06 %  ESV(Teich): 33 98 ml  IVSd: 0 94 cm  LA Area: 13 2 cm2  LA Diam: 3 98 cm  LVEDV MOD A4C: 32 01 ml  LVEF MOD A4C: 63 74 %  LVESV MOD A4C: 11 61 ml  LVIDd: 4 72 cm  LVIDs: 2 96 cm  LVLd A4C: 5 9 cm  LVLs A4C: 4 79 cm  LVPWd: 0 93 cm  RA Area: 8 67 cm2  RVIDd: 3 35 cm  SV MOD A4C: 20 4 ml  SV(Teich): 69 19 ml    CW  TR MaxP 39 mmHg  TR Vmax: 2 89 m/s    MM  TAPSE: 1 51 cm    [facility] Accredited Echocardiography Laboratory    Prepared and electronically signed by    Valeria Lesch, MD  Signed 2021 11:05:34      CATH/STRESS TEST:       EKG:   Rate controlled AF         History of Present Illness     HPI/INTERVAL HISTORY: Adry Cronin is a 67 y o  female with history as above who presents to B EP office today under direction of Dr Neil Morrison for routine follow up  Patient has felt concerned with fatigue and JOHNSON in the past 2-3 months which is typical for her atrial fibrillation  She did have DCCV in  but device reports showing persistent AF since march  She has been on amiodarone   Having gait dysfunction past 2-3 months does not have the best balance  To complicate matters she has a new diagnosis of meningioma found on MRI, was having headaches  Has NSX consult with [facility] this Friday  Review of Systems  ROS as noted above, otherwise 12 point review of systems was performed and is negative  Historical Information   Social History     Socioeconomic History    Marital status:       Spouse name: Not on file    Number of children: Not on file    Years of education: Not on file    Highest education level: Not on file   Occupational History    Not on file   Tobacco Use    Smoking status: Never Smoker    Smokeless tobacco: Never Used   Vaping Use    Vaping Use: Never used   Substance and Sexual Activity    Alcohol use: Not Currently     Comment: occosional - every 3 months    Drug use: Never    Sexual activity: Not Currently   Other Topics Concern    Not on file   Social History Narrative    Not on file     Social Determinants of Health     Financial Resource Strain: Not on file   Food Insecurity: Not on file   Transportation Needs: Not on file   Physical Activity: Not on file   Stress: Not on file   Social Connections: Not on file   Intimate Partner Violence: Not on file   Housing Stability: Not on file     Past Medical History:   Diagnosis Date    Actinic keratosis     last assessed - [id number]    Arthritis     [facility]     last assessed - [id number]    Basal cell carcinoma     Benign colon polyp     last assessed - [id number]    Disease of thyroid gland     Effusion of knee joint right     Resolved - [id number]    Esophageal reflux     Fibromyalgia     last assessed - [id number]    Fibromyalgia, primary     GERD (gastroesophageal reflux disease)     Hypertension     Palpitations     last assessed - [id number]    Peroneal tendonitis, right     last assessed - [id number]    Right lumbar radiculopathy 3/17/2016    Thyroid cancer ([facility] )     Thyroid nodule     Trochanteric bursitis of left hip 3/9/2018     Past Surgical History:   Procedure Laterality Date    CATARACT EXTRACTION Bilateral     COLONOSCOPY  03/2018    EYE SURGERY      HYSTERECTOMY      JOINT REPLACEMENT Left     knee    FL REVISE MEDIAN N/CARPAL TUNNEL SURG Right 11/14/2019    Procedure: RELEASE CARPAL TUNNEL;  Surgeon: Thom Rodríguez MD;  [facility];  Service: Orthopedics    FL THYROID LOBECTOMY,UNILAT Left 12/16/2020    Procedure: Left THYROID LOBECTOMY;  Surgeon: Sarah Boone MD;  Location[facility];  Service: ENT    RECTAL POLYPECTOMY      REPLACEMENT TOTAL KNEE Left     last assessed - [id number]    TONSILLECTOMY      TOTAL ABDOMINAL HYSTERECTOMY      TUBAL LIGATION      US GUIDED THYROID BIOPSY  10/14/2020     Social History     Substance and Sexual Activity   Alcohol Use Not Currently    Comment: occosional - every 3 months     Social History     Substance and Sexual Activity   Drug Use Never     Social History     Tobacco Use   Smoking Status Never Smoker   Smokeless Tobacco Never Used     Family History   Problem Relation Age of Onset    Heart disease Mother     Diabetes Mother     Heart disease Father     Coronary artery disease Father     Stroke Father         cerebrovascular accident    Heart attack Father         myocardial infarction    Sudden death Father         scd    Other Family         Back disorder    Coronary artery disease Family     Neuropathy Family     Osteoporosis Family     No Known Problems Daughter     No Known Problems Maternal Grandmother     No Known Problems Maternal Grandfather     No Known Problems Paternal Grandmother     No Known Problems Paternal Grandfather     Cancer Maternal Uncle     Breast cancer Maternal Aunt 72    No Known Problems Son     No Known Problems Maternal Aunt     No Known Problems Maternal Aunt     No Known Problems Maternal Aunt     No Known Problems Paternal Aunt     No Known Problems Paternal Aunt     Anuerysm Neg Hx     Clotting disorder Neg Hx     Arrhythmia Neg Hx     Heart failure Neg Hx        Meds/Allergies       Current Outpatient Medications:     acetaminophen (TYLENOL) 650 mg CR tablet, Take 1 tablet (650 mg total) by mouth every 6 (six) hours as needed for mild pain or moderate pain (Patient taking differently: Take 300 mg by mouth 2 (two) times a day  ), Disp: 30 tablet, Rfl: 0    amiodarone 200 mg tablet, Take 1 tablet (200 mg total) by mouth daily, Disp: 90 tablet, Rfl: 2    apixaban (ELIQUIS) 5 mg, Take 1 tablet (5 mg total) by mouth 2 (two) times a day, Disp: 56 tablet, Rfl: 0    ascorbic acid (VITAMIN C) 500 mg tablet, Take 500 mg by mouth daily , Disp: , Rfl:     Cholecalciferol ([employer]S VITAMIN D) 2000 UNITS CAPS, Take 2,000 Units by mouth 2 (two) times a day  , Disp: , Rfl:     Chromium Picolinate (CHROMIUM PICOLATE PO), Take 1 tablet by mouth daily, Disp: , Rfl:     DULoxetine (CYMBALTA) 30 mg delayed release capsule, take 1 capsule by mouth daily every morning take with food, Disp: 30 capsule, Rfl: 2    ezetimibe (ZETIA) 10 mg tablet, TAKE 1 TABLET BY MOUTH  DAILY, Disp: 90 tablet, Rfl: 3    famotidine (PEPCID) 20 mg tablet, Take 20 mg by mouth every other day  , Disp: , Rfl:     gabapentin (NEURONTIN) 300 mg capsule, TAKE 2 CAPSULES BY MOUTH AT BEDTIME, Disp: 180 capsule, Rfl: 4    ipratropium (ATROVENT) 0 03 % nasal spray, 2 sprays into each nostril 3 (three) times a day Begin once per day, then progress to twice per day  Progress to three times a day as tolerated  , Disp: 90 mL, Rfl: 3    lidocaine (XYLOCAINE) 5 % ointment, Apply topically as needed for mild pain (Patient not taking: Reported on 4/1/2022 ), Disp: 35 44 g, Rfl: 0    losartan (COZAAR) 50 mg tablet, Take 1 tablet (50 mg total) by mouth 2 (two) times a day, Disp: 180 tablet, Rfl: 1    metoprolol succinate (TOPROL-XL) 25 mg 24 hr tablet, Take 4 tablets (100 mg total) by mouth daily, Disp: 360 tablet, Rfl: 1    rOPINIRole (REQUIP) 0 25 mg tablet, TAKE 1 TABLET BY MOUTH  DAILY AT BEDTIME, Disp: 90 tablet, Rfl: 3    traMADol (ULTRAM) 50 mg tablet, Take 1 tablet (50 mg total) by mouth 2 (two) times a day as needed for moderate pain, Disp: 60 tablet, Rfl: 3    TURMERIC PO, Take 1 capsule by mouth 2 (two) times a day, Disp: , Rfl:     zolpidem (AMBIEN) 10 mg tablet, Take 1 tablet (10 mg total) by mouth daily at bedtime as needed for sleep, Disp: 90 tablet, Rfl: 1    Allergies   Allergen Reactions    Sotalol Other (See Comments)     Prolonged QTc leading to torsades de pointes     Penicillins Other (See Comments)     As a child calcium deposit in the arm     Ace Inhibitors GI Intolerance     Did feel well on it       Objective   Vitals: Last menstrual period 02/01/1990, not currently breastfeeding  Physical Exam  Constitutional:       Appearance: She is well-developed  HENT:      Head: Normocephalic and atraumatic  Eyes:      Pupils: Pupils are equal, round, and reactive to light  Cardiovascular:      Rate and Rhythm: Normal rate  Rhythm irregularly irregular  Pulmonary:      Effort: Pulmonary effort is normal       Breath sounds: Normal breath sounds  Abdominal:      General: Bowel sounds are normal       Palpations: Abdomen is soft  Musculoskeletal:         General: Normal range of motion  Cervical back: Normal range of motion and neck supple  Skin:     General: Skin is warm and dry  Neurological:      Mental Status: She is alert and oriented to person, place, and time             Labs:  Admission on 04/01/2022, Discharged on 04/01/2022   Component Date Value    WBC 04/01/2022 9 69     RBC 04/01/2022 4 82     Hemoglobin 04/01/2022 15 2     Hematocrit 04/01/2022 46 2*    MCV 04/01/2022 96     MCH 04/01/2022 31 5     MCHC 04/01/2022 32 9     RDW 04/01/2022 13 2     MPV 04/01/2022 9 8     Platelets 23/33/6097 324     nRBC 04/01/2022 0     Neutrophils Relative 04/01/2022 75     Immat GRANS % 04/01/2022 0     Lymphocytes Relative 04/01/2022 15     Monocytes Relative 04/01/2022 10     Eosinophils Relative 04/01/2022 0     Basophils Relative 04/01/2022 0     Neutrophils Absolute 04/01/2022 7 20     Immature Grans Absolute 04/01/2022 0 04     Lymphocytes Absolute 04/01/2022 1 44     Monocytes Absolute 04/01/2022 1 00     Eosinophils Absolute 04/01/2022 0 01     Basophils Absolute 04/01/2022 0 00     Sodium 04/01/2022 131*    Potassium 04/01/2022 4 6     Chloride 04/01/2022 97*    CO2 04/01/2022 26     ANION GAP 04/01/2022 8     BUN 04/01/2022 37*    Creatinine 04/01/2022 1 22     Glucose 04/01/2022 128     Calcium 04/01/2022 9 1     AST 04/01/2022 17     ALT 04/01/2022 28     Alkaline Phosphatase 04/01/2022 70     Total Protein 04/01/2022 7 9     Albumin 04/01/2022 3 9     Total Bilirubin 04/01/2022 0 79     eGFR 04/01/2022 42     hs TnI 0hr 04/01/2022 7     D-Dimer, Quant 04/01/2022 <0 27     TSH 3RD GENERATON 04/01/2022 0 672     hs TnI 2hr 04/01/2022 6     Delta 2hr hsTnI 04/01/2022 -1     Ventricular Rate 04/01/2022 92     Atrial Rate 04/01/2022 288     QRSD Interval 04/01/2022 98     QT Interval 04/01/2022 364     QTC Interval 04/01/2022 450     P Axis 04/01/2022 0     QRS Axis 04/01/2022 87     T Wave Axis 04/01/2022 -10     Ventricular Rate 04/01/2022 99     Atrial Rate 04/01/2022 112     MA Interval 04/01/2022 0     QRSD Interval 04/01/2022 100     QT Interval 04/01/2022 354     QTC Interval 04/01/2022 455     P Axis 04/01/2022 225     QRS Axis 04/01/2022 83     T Wave Sproul 04/01/2022 -8    Hospital Outpatient Visit on 03/11/2022   Component Date Value    Ventricular Rate 03/11/2022 102     Atrial Rate 03/11/2022 106     MA Interval 03/11/2022 0     QRSD Interval 03/11/2022 108     QT Interval 03/11/2022 372     QTC Interval 03/11/2022 485     P Axis 03/11/2022 169     QRS Axis 03/11/2022 34     T Wave Axis 03/11/2022 -2     Ventricular Rate 03/11/2022 62     Atrial Rate 03/11/2022 62     IA Interval 03/11/2022 192     QRSD Interval 03/11/2022 92     QT Interval 03/11/2022 484     QTC Interval 03/11/2022 491     P Axis 03/11/2022 93     QRS Axis 03/11/2022 36     T Wave Axis 03/11/2022 -1        Imaging: I have personally reviewed pertinent reports

## 2022-05-11 ENCOUNTER — OFFICE VISIT (OUTPATIENT)
Dept: PHYSICAL THERAPY | Facility: MEDICAL CENTER | Age: 77
End: 2022-05-11
Payer: MEDICARE
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
## 2022-05-11 DIAGNOSIS — T84.023D INSTABILITY OF INTERNAL LEFT KNEE PROSTHESIS, SUBSEQUENT ENCOUNTER: ICD-10-CM

## 2022-05-11 DIAGNOSIS — R26.9 GAIT DISTURBANCE: ICD-10-CM

## 2022-05-11 PROCEDURE — 97110 THERAPEUTIC EXERCISES: CPT | Performed by: PHYSICAL THERAPIST

## 2022-05-11 PROCEDURE — 97140 MANUAL THERAPY 1/> REGIONS: CPT | Performed by: PHYSICAL THERAPIST

## 2022-05-11 PROCEDURE — 97112 NEUROMUSCULAR REEDUCATION: CPT | Performed by: PHYSICAL THERAPIST

## 2022-05-11 NOTE — PROGRESS NOTES
Daily Note     Today's date: 2022  Patient name: Fleipe Ellington  : 1945  MRN: [id number]  Referring provider: Jie Silva MD  Dx:   Encounter Diagnosis     ICD-10-CM    1  Fall, subsequent encounter  W19  XXXD    2  Instability of internal left knee prosthesis, subsequent encounter  T84 023D    3  Fall, initial encounter  [address]  XXXA    4  Gait disturbance  R26 9        Start Time: 1018  Stop Time: 1058  Total time in clinic (min): 40 minutes    Subjective: pt reports that she has had increased knee pain and L calf pain yesterday and into today  She notes calf pain began after completing increased reps of HR exercise  Objective: See treatment diary below      Assessment: Tolerated treatment well  Calf stretching relieved pain in the calf  This was given as HEP  Decrease intensiyt of exercise was completed in order to improve shoulder symptoms  We will continue to progress as able  Patient would benefit from continued PT      Plan: Continue per plan of care  Precautions: A-fib, CHF, Hx HA, L TKA, CKD, fibromyalgia, RLS, pacemaker, tremors    Pt 1:1 from   Manuals 2022 5/ 5/6          L knee PROM    RK RK                                                Neuro Re-Ed             LAQ x10 2x10 3"  2x10 3" ea  Standing hip abd/ext x10 ea  2x10 ea  2x10 ea  On foam 2x10 ea  SAQ  2x10 3" 2x10 3" ea  2x10 3" ea  FT balance  FA EC foam 30"x3 FA EC foam 30"x3          Tandem stance/walking  stance 30"x2 ea  No foam stance 30"x2 ea  No foam          SLR  2x10 2x10 ea  2x10 ea  Foam marching  x20 ea  x20 ea  x10 ea  Ther Ex             Heel raise x10 x20 x20 on foam x10 no foam         Side stepping  Foam 10 laps Foam 10 laps 10 laps no foam         Mini squats   LP #35 2x10 LP #35 2x10          bike  5' slow 5' slow 5' slow          Step ups  4" x10 ea  4" x10 ea   4" x10 ea          hurdles             Calf stretch    30"x3 strap         Standing calf stretch    30"x3          Ther Activity                                       Gait Training                                       Modalities             CP    B/l knees 8' post

## 2022-05-13 ENCOUNTER — APPOINTMENT (OUTPATIENT)
Dept: PHYSICAL THERAPY | Facility: MEDICAL CENTER | Age: 77
End: 2022-05-13
Payer: MEDICARE

## 2022-05-16 ENCOUNTER — HOSPITAL ENCOUNTER (OUTPATIENT)
Dept: NON INVASIVE DIAGNOSTICS | Facility: CLINIC | Age: 77
Discharge: HOME | End: 2022-05-16
Payer: MEDICARE

## 2022-05-16 ENCOUNTER — TELEPHONE (OUTPATIENT)
Dept: CARDIOLOGY CLINIC | Facility: CLINIC | Age: 77
End: 2022-05-16

## 2022-05-16 VITALS
HEART RATE: 88 BPM | BODY MASS INDEX: 31.1 KG/M2 | HEIGHT: 62 IN | WEIGHT: 169 LBS | DIASTOLIC BLOOD PRESSURE: 82 MMHG | SYSTOLIC BLOOD PRESSURE: 142 MMHG

## 2022-05-16 DIAGNOSIS — I10 ESSENTIAL HYPERTENSION: ICD-10-CM

## 2022-05-16 DIAGNOSIS — I48.0 PAROXYSMAL ATRIAL FIBRILLATION (HCC): ICD-10-CM

## 2022-05-16 DIAGNOSIS — I48.19 PERSISTENT ATRIAL FIBRILLATION (HCC): Primary | ICD-10-CM

## 2022-05-16 DIAGNOSIS — Z95.0 S/P PLACEMENT OF CARDIAC PACEMAKER: ICD-10-CM

## 2022-05-16 DIAGNOSIS — F33.0 MILD EPISODE OF RECURRENT MAJOR DEPRESSIVE DISORDER (HCC): ICD-10-CM

## 2022-05-16 DIAGNOSIS — E78.2 MIXED HYPERLIPIDEMIA: ICD-10-CM

## 2022-05-16 DIAGNOSIS — I50.32 CHRONIC DIASTOLIC CHF (CONGESTIVE HEART FAILURE) (HCC): ICD-10-CM

## 2022-05-16 LAB
AORTIC ROOT: 2.8 CM
APICAL FOUR CHAMBER EJECTION FRACTION: 48 %
ASCENDING AORTA: 2.6 CM
E WAVE DECELERATION TIME: 145 MS
FRACTIONAL SHORTENING: 26 % (ref 28–44)
INTERVENTRICULAR SEPTUM IN DIASTOLE (PARASTERNAL SHORT AXIS VIEW): 1.2 CM
INTERVENTRICULAR SEPTUM: 1.2 CM (ref 0.6–1.1)
LAAS-AP2: 22.5 CM2
LAAS-AP4: 26.1 CM2
LEFT ATRIUM SIZE: 4.8 CM
LEFT INTERNAL DIMENSION IN SYSTOLE: 3.5 CM (ref 2.1–4)
LEFT VENTRICLE DIASTOLIC VOLUME (MOD BIPLANE): 76 ML
LEFT VENTRICLE SYSTOLIC VOLUME (MOD BIPLANE): 41 ML
LEFT VENTRICULAR INTERNAL DIMENSION IN DIASTOLE: 4.7 CM (ref 3.5–6)
LEFT VENTRICULAR POSTERIOR WALL IN END DIASTOLE: 1.2 CM
LEFT VENTRICULAR STROKE VOLUME: 53 ML
LV EF: 46 %
LVSV (TEICH): 53 ML
MV E'TISSUE VEL-SEP: 8 CM/S
MV PEAK A VEL: 0.4 M/S
MV PEAK E VEL: 108 CM/S
MV STENOSIS PRESSURE HALF TIME: 42 MS
MV VALVE AREA P 1/2 METHOD: 5.24 CM2
RA PRESSURE ESTIMATED: 10 MMHG
RIGHT ATRIUM AREA SYSTOLE A4C: 20.1 CM2
RIGHT VENTRICLE ID DIMENSION: 4.1 CM
RV PSP: 67 MMHG
SL CV LEFT ATRIUM LENGTH A2C: 5.7 CM
SL CV LV EF: 45
SL CV PED ECHO LEFT VENTRICLE DIASTOLIC VOLUME (MOD BIPLANE) 2D: 104 ML
SL CV PED ECHO LEFT VENTRICLE SYSTOLIC VOLUME (MOD BIPLANE) 2D: 52 ML
TR MAX PG: 57 MMHG
TR PEAK VELOCITY: 3.8 M/S
TRICUSPID VALVE PEAK REGURGITATION VELOCITY: 4.35 M/S

## 2022-05-16 PROCEDURE — 93306 TTE W/DOPPLER COMPLETE: CPT

## 2022-05-16 PROCEDURE — 93306 TTE W/DOPPLER COMPLETE: CPT | Performed by: INTERNAL MEDICINE

## 2022-05-16 RX ORDER — METOPROLOL SUCCINATE 100 MG/1
100 TABLET, EXTENDED RELEASE ORAL 2 TIMES DAILY
Qty: 180 TABLET | Refills: 3 | Status: SHIPPED | OUTPATIENT
Start: 2022-05-16 | End: 2022-06-17

## 2022-05-16 RX ORDER — METOPROLOL SUCCINATE 25 MG/1
25 TABLET, EXTENDED RELEASE ORAL DAILY
Qty: 1 TABLET | Refills: 0 | Status: SHIPPED | COMMUNITY
Start: 2022-05-16 | End: 2022-06-07 | Stop reason: ALTCHOICE

## 2022-05-16 RX ORDER — DULOXETIN HYDROCHLORIDE 30 MG/1
30 CAPSULE, DELAYED RELEASE ORAL
Qty: 30 CAPSULE | Refills: 2 | Status: SHIPPED | OUTPATIENT
Start: 2022-05-16 | End: 2022-06-27 | Stop reason: SDUPTHER

## 2022-05-18 ENCOUNTER — APPOINTMENT (OUTPATIENT)
Dept: PHYSICAL THERAPY | Facility: MEDICAL CENTER | Age: 77
End: 2022-05-18
Payer: MEDICARE

## 2022-05-19 ENCOUNTER — TELEPHONE (OUTPATIENT)
Dept: CARDIOLOGY CLINIC | Facility: CLINIC | Age: 77
End: 2022-05-19

## 2022-05-19 NOTE — TELEPHONE ENCOUNTER
----- Message from Mario Chang MD sent at 5/18/2022  4:12 PM EDT -----  [id number] [address] muñoz me  ----- Message -----  From: Soraida Peck MA  Sent: 5/18/2022   3:57 PM EDT  To: Vinay Vargas MD, Mario Chang MD, #    Dr Keke Ivy, on 06/01/22 you are on L4 with flutter ablation and DC PM, can I scheduled the patient on this day, right? Thank you     ----- Message -----  From: Mario Chang MD  Sent: 5/18/2022   3:54 PM EDT  To: Vinay Vargas MD, Soraida Peck MA, #    Sure happy to do redo PVI and posterior wall ablation w cryo  SHANNAN  HOld her blood thinner same day as procedure      ----- Message -----  From: Анна Mohan PA-C  Sent: 5/18/2022   1:38 PM EDT  To: Vinay Vargas MD, Mario Chang MD, #    Michael Kingsley,     The above patient is taken care of by myself and Dr Aiken Unruly  She has persistent afib despite amiodarone loading and is very fatigued in afib  Her EF has dropped from 55% to 45% due to tachycardia, we have adjusted medications but the increase in AVNB is contributing to fatigue  She has had an afib ablation in the past (2021)  She would need repeat PVI and posterior wall isolation  Next week (I am not sure the day) you have an afib/aflutter and a pacemaker scheduled  Do you think you could add her on for comprehensive afib as well? Asking you as Dr Aiken Unruly has no spots until end of June  Neither does hardi

## 2022-05-19 NOTE — TELEPHONE ENCOUNTER
Spoke with patient in regard her ablation procedure to be done by Dr Bonnie Pelayo  Patient said she will hold off for now, she is getting a second opinion  I provide my phone number to call back in the case that she will decide to have the procedure  Thank you

## 2022-05-20 ENCOUNTER — APPOINTMENT (OUTPATIENT)
Dept: PHYSICAL THERAPY | Facility: MEDICAL CENTER | Age: 77
End: 2022-05-20
Payer: MEDICARE

## 2022-05-23 ENCOUNTER — TELEPHONE (OUTPATIENT)
Dept: FAMILY MEDICINE CLINIC | Facility: CLINIC | Age: 77
End: 2022-05-23

## 2022-05-23 DIAGNOSIS — G47.01 INSOMNIA DUE TO MEDICAL CONDITION: ICD-10-CM

## 2022-05-23 NOTE — TELEPHONE ENCOUNTER
Call she can increase dose of Ropinirole to 0 5 mg at HS-take two 0 25 mg tablets at HS  Current Outpatient Medications:     acetaminophen (TYLENOL) 650 mg CR tablet, Take 1 tablet (650 mg total) by mouth every 6 (six) hours as needed for mild pain or moderate pain (Patient taking differently: Take 300 mg by mouth 2 (two) times a day  ), Disp: 30 tablet, Rfl: 0    amiodarone 200 mg tablet, Take 1 tablet (200 mg total) by mouth daily, Disp: 90 tablet, Rfl: 2    apixaban (ELIQUIS) 5 mg, Take 1 tablet (5 mg total) by mouth 2 (two) times a day, Disp: 56 tablet, Rfl: 0    ascorbic acid (VITAMIN C) 500 mg tablet, Take 500 mg by mouth daily , Disp: , Rfl:     Cholecalciferol (CVS VITAMIN D) 2000 UNITS CAPS, Take 2,000 Units by mouth 2 (two) times a day  , Disp: , Rfl:     Chromium Picolinate (CHROMIUM PICOLATE PO), Take 1 tablet by mouth daily, Disp: , Rfl:     DULoxetine (CYMBALTA) 30 mg delayed release capsule, Take 1 capsule (30 mg total) by mouth daily after breakfast, Disp: 30 capsule, Rfl: 2    ezetimibe (ZETIA) 10 mg tablet, TAKE 1 TABLET BY MOUTH  DAILY, Disp: 90 tablet, Rfl: 3    famotidine (PEPCID) 20 mg tablet, Take 20 mg by mouth every other day  , Disp: , Rfl:     gabapentin (NEURONTIN) 300 mg capsule, TAKE 2 CAPSULES BY MOUTH AT BEDTIME, Disp: 180 capsule, Rfl: 4    ipratropium (ATROVENT) 0 03 % nasal spray, 2 sprays into each nostril 3 (three) times a day Begin once per day, then progress to twice per day  Progress to three times a day as tolerated  , Disp: 90 mL, Rfl: 3    lidocaine (XYLOCAINE) 5 % ointment, Apply topically as needed for mild pain (Patient not taking: Reported on 4/1/2022 ), Disp: 35 44 g, Rfl: 0    losartan (COZAAR) 50 mg tablet, Take 1 tablet (50 mg total) by mouth 2 (two) times a day, Disp: 180 tablet, Rfl: 1    metoprolol succinate (TOPROL-XL) 100 mg 24 hr tablet, Take 1 tablet (100 mg total) by mouth in the morning and 1 tablet (100 mg total) in the evening , Disp: 180 tablet, Rfl: 3    metoprolol succinate (TOPROL-XL) 25 mg 24 hr tablet, Take 1 tablet (25 mg total) by mouth in the morning , Disp: 1 tablet, Rfl: 0    rOPINIRole (REQUIP) 0 25 mg tablet, TAKE 1 TABLET BY MOUTH  DAILY AT BEDTIME, Disp: 90 tablet, Rfl: 3    traMADol (ULTRAM) 50 mg tablet, Take 1 tablet (50 mg total) by mouth 2 (two) times a day as needed for moderate pain, Disp: 60 tablet, Rfl: 3    TURMERIC PO, Take 1 capsule by mouth 2 (two) times a day, Disp: , Rfl:     zolpidem (AMBIEN) 10 mg tablet, Take 1 tablet (10 mg total) by mouth daily at bedtime as needed for sleep, Disp: 90 tablet, Rfl: 1

## 2022-05-23 NOTE — TELEPHONE ENCOUNTER
Patient called stating that the restless leg syndrome has been absolutely horrible  She is not able to rest at all, her legs are jumping all night long  Can the Ropinrole be increased? Please advise

## 2022-05-25 ENCOUNTER — APPOINTMENT (OUTPATIENT)
Dept: PHYSICAL THERAPY | Facility: MEDICAL CENTER | Age: 77
End: 2022-05-25
Payer: MEDICARE

## 2022-05-25 DIAGNOSIS — G25.81 RLS (RESTLESS LEGS SYNDROME): ICD-10-CM

## 2022-05-25 RX ORDER — ROPINIROLE 0.5 MG/1
0.25 TABLET, FILM COATED ORAL
Qty: 90 TABLET | Refills: 3 | OUTPATIENT
Start: 2022-05-25

## 2022-05-25 RX ORDER — ROPINIROLE 0.5 MG/1
0.5 TABLET, FILM COATED ORAL
Qty: 90 TABLET | Refills: 3 | Status: SHIPPED | OUTPATIENT
Start: 2022-05-25 | End: 2023-04-27 | Stop reason: SDUPTHER

## 2022-05-26 ENCOUNTER — TELEPHONE (OUTPATIENT)
Dept: CARDIOLOGY CLINIC | Facility: CLINIC | Age: 77
End: 2022-05-26

## 2022-05-26 DIAGNOSIS — I10 ESSENTIAL HYPERTENSION: ICD-10-CM

## 2022-05-26 RX ORDER — LOSARTAN POTASSIUM 50 MG/1
TABLET ORAL
Qty: 45 TABLET | Refills: 3 | Status: SHIPPED | OUTPATIENT
Start: 2022-05-26 | End: 2022-05-31 | Stop reason: SDUPTHER

## 2022-05-26 NOTE — TELEPHONE ENCOUNTER
S/w Ravi Muniz, advised to increase Losartan to 75 mg BID and continue to monitor bp  Erum Giordano recommendations-- verbally understood

## 2022-05-26 NOTE — TELEPHONE ENCOUNTER
Patient l/m on nurse line requesting return call  I returned her call  BP's x1-2 weeks 170's-180's/90's-100's  HR 80's  C/o fatigue    Currently taking;   Toprol  mg BID  Amiodarone 200 mg BID  Losartan 50 mg BID    Scheduled for ablation w/ Jem 6/14    Dr Thomason March is off rest of week

## 2022-05-27 ENCOUNTER — APPOINTMENT (OUTPATIENT)
Dept: PHYSICAL THERAPY | Facility: MEDICAL CENTER | Age: 77
End: 2022-05-27
Payer: MEDICARE

## 2022-05-31 ENCOUNTER — TELEPHONE (OUTPATIENT)
Dept: FAMILY MEDICINE CLINIC | Facility: CLINIC | Age: 77
End: 2022-05-31

## 2022-05-31 DIAGNOSIS — I10 ESSENTIAL HYPERTENSION: Primary | ICD-10-CM

## 2022-05-31 DIAGNOSIS — I10 ESSENTIAL HYPERTENSION: ICD-10-CM

## 2022-05-31 RX ORDER — LOSARTAN POTASSIUM 50 MG/1
TABLET ORAL
Qty: 180 TABLET | Refills: 1 | Status: SHIPPED | OUTPATIENT
Start: 2022-05-31

## 2022-05-31 RX ORDER — LOSARTAN POTASSIUM 50 MG/1
TABLET ORAL
Qty: 270 TABLET | Refills: 3 | Status: SHIPPED | OUTPATIENT
Start: 2022-05-31 | End: 2022-05-31 | Stop reason: SDUPTHER

## 2022-05-31 RX ORDER — LOSARTAN POTASSIUM 25 MG/1
TABLET ORAL
Qty: 90 TABLET | Refills: 1 | Status: SHIPPED | OUTPATIENT
Start: 2022-05-31 | End: 2022-06-17

## 2022-05-31 NOTE — TELEPHONE ENCOUNTER
Patient complaining of ongoing pressure in her brain  She said she is not sure if is the brain tumor she was diagnosed with or her BP which is being managed by cardiology  Please advise

## 2022-06-01 NOTE — TELEPHONE ENCOUNTER
Call not sure is she is monitoring her BP  MRI brain 04/2022 showed a meningioma  Did she see Neurosurgery? She would need to evaluated can offer an appt     Procedure: XR knee 3 vw left non injury    Result Date: 4/1/2022  Narrative: LEFT KNEE INDICATION:   Z20 740: Presence of left artificial knee joint  COMPARISON:  January 18, 2022 VIEWS:  XR KNEE 3 VW LEFT NON INJURY Images: 3 FINDINGS: There is no acute fracture or dislocation  Small knee effusion seen Knee arthroplasty seen  There is no finding of loosening or fracture Patellar resurfacing noted No lytic or blastic osseous lesion  Soft tissues are unremarkable  Impression; Knee arthroplasty with stable alignment Workstation performed: [id number]     Procedure: XR knee 3 vw right non injury    Result Date: 4/1/2022  Narrative: RIGHT KNEE INDICATION:   M17 11: Unilateral primary osteoarthritis, right knee  COMPARISON:  None VIEWS:  XR KNEE 3 VW RIGHT NON INJURY Images: 4 FINDINGS: There is no acute fracture or dislocation  Mall knee effusion seen Moderate to Severe medial compartment arthritis with joint space narrowing  Moderate patellofemoral arthritis No lytic or blastic osseous lesion  Soft tissues are unremarkable  Impression: Moderate to severe medial compartment arthritis with varus deformity Moderate patellofemoral arthritis No acute displaced fracture Workstation performed: [id number]     Procedure: CT head wo contrast    Result Date: 4/4/2022  Narrative: CT BRAIN - WITHOUT CONTRAST INDICATION:   W19  XXXA: Unspecified fall, initial encounter Z79 01: Long term (current) use of anticoagulants  COMPARISON:  None  TECHNIQUE:  CT examination of the brain was performed  In addition to axial images, sagittal and coronal 2D reformatted images were created and submitted for interpretation  Radiation dose length product (DLP) for this visit:  586 75 mGy-cm     This examination, like all CT scans performed in the [facility], was performed utilizing techniques to minimize radiation dose exposure, including the use of iterative  reconstruction and automated exposure control  IMAGE QUALITY:  Diagnostic  FINDINGS: PARENCHYMA:  In the left frontal operculum on image 18, series 2 there is a small cluster of somewhat branched calcifications, possibly dystrophic from prior insult or related to underlying vascular lesion such as a developmental venous angioma or cavernoma  Other etiologies not excluded  Periventricular hypodensities elsewhere noted  VENTRICLES AND EXTRA-AXIAL SPACES:  Normal for the patient's age  VISUALIZED ORBITS AND PARANASAL SINUSES:  Bilateral lens implants noted CALVARIUM AND EXTRACRANIAL SOFT TISSUES:  Normal      Impression: 1  No intracranial hemorrhage or calvarial fracture  2   Mild to moderate, chronic microangiopathy  3   Small calcification in the left frontal operculum, possibly dystrophic from prior insult  Underlying vascular lesion or other etiologies not excluded  Contrast enhanced MRI of the brain could be obtained for further characterization  No mass effect or associated edema  Considerations related to the patient's age and/or comorbidities may be used to alter these recommendations  Workstation performed: [id number]     Procedure: MRI brain w wo contrast    Result Date: 4/27/2022  Narrative: MRI BRAIN WITH AND WITHOUT CONTRAST INDICATION: R93 0: Abnormal findings on diagnostic imaging of skull and head, not elsewhere classified  COMPARISON:  CT dated 3/31/2022 TECHNIQUE: Sagittal T1, axial T2, axial FLAIR, axial T1, axial Jackson, axial diffusion  Sagittal, axial T1 postcontrast   Axial bravo postcontrast with coronal reconstructions  IV Contrast:  7 mL of Gadobutrol injection (SINGLE-DOSE)  IMAGE QUALITY:   Diagnostic  FINDINGS: BRAIN PARENCHYMA:  There is an extra-axial mass identified lateral to the left frontal lobe within the anterior cranial fossa    This mass demonstrates relatively homogeneous enhancement after administration of contrast with mild adjacent dural thickening  This mass measures 4 2 x 1 7 x 3 0 cm, consistent with meningioma  The lateral aspect of the frontal lobe is displaced medially resulting in enlargement of the extra-axial CSF spaces adjacent to this extra-axial mass  There is no edema within the frontal lobe  There is dural thickening and enhancement extending more superiorly within the anterior cranial fossa and extending more posteriorly and inferiorly into the middle cranial fossa  Small scattered hyperintensities on T2/FLAIR imaging are noted in the periventricular and subcortical white matter demonstrating an appearance that is statistically most likely to represent mild to moderate microangiopathic change  Brainstem and cerebellum demonstrate normal signal  Postcontrast imaging of the remainder of the brain parenchyma is unremarkable  VENTRICLES:  Normal for the patient's age  SELLA AND PITUITARY GLAND:  Normal  ORBITS:  Normal  PARANASAL SINUSES:  Normal  VASCULATURE:  Evaluation of the major intracranial vasculature demonstrates appropriate flow voids  CALVARIUM AND SKULL BASE:  Normal  EXTRACRANIAL SOFT TISSUES:  Normal      Impression: 4 2 x 1 7 x 3 0 cm fairly homogeneously enhancing, extra-axial mass within the inferior lateral aspect of the left anterior cranial fossa  Mild adjacent dural thickening and enhancement is seen extending inferiorly into the middle cranial fossa and superiorly into the dura of the anterior frontal vertex  Findings are most suggestive of meningioma  Surgical consultation recommended  This examination was marked "immediate notification" in Epic in order to begin the standard process by which the radiology reading room liaison alerts the referring practitioner    Workstation performed: [id number]     Procedure: DXA bone density spine hip and pelvis    Result Date: 2/17/2022  Narrative: CENTRAL DXA SCAN CLINICAL HISTORY:  50-year-old postmenopausal  female with no significant past medical history  OTHER RISK FACTORS:  Cymbalta use  PHARMACOLOGIC THERAPY FOR OSTEOPOROSIS:  None currently  TECHNIQUE: Bone densitometry was performed using a Hologic Horizon A  bone densitometer  Regions of interest appear properly placed  COMPARISON: September 14, 2018  RESULTS: LUMBAR SPINE L1-L4 : BMD  0 899  gm/cm2 T-score -1 3  These values are artifactually elevated due to the presence of scoliosis with spondylosis  LEFT  TOTAL HIP: BMD:  0 807  gm/cm2 T-score:  -1 1 LEFT  FEMORAL NECK: BMD:  0 629  gm/cm2 T score: -2 0     Impression: 1  Low bone mass (OSTEOPENIA)  [Based on the left femoral neck] 2  When compared to the prior examination, there has been NO SIGNIFICANT CHANGE in the total bone mineral density of the lumbar spine, when allowing for the least significant change  There has however been a 4% DECREASE in the total bone mineral density of the left hip  3   The 10 year risk of hip fracture is 4 with the 10 year risk of major osteoporotic fracture being 14 as calculated by the [facility]/WHO fracture risk assessment tool (FRAX)  4   The current NOF guidelines recommend treating patients with a T-score of -2 5 or less in the lumbar spine or hips, or in post-menopausal women and men over the age of 48 with low bone mass (osteopenia) and a FRAX 10 year risk score of >3% for hip fracture and/or >20% for major osteoporotic fracture  5   The NOF recommends follow-up DXA in 1-2 years after initiating therapy for osteoporosis and every 2 years thereafter  More frequent evaluation is appropriate for patients with conditions associated with rapid bone loss, such as glucocorticoid therapy  The interval between DXA screenings may be longer for individuals without major risk factors and initial T-score in the normal or upper low bone mass range   The FRAX algorithm has certain limitations: -FRAX has not been validated in patients currently or previously treated with pharmacotherapy for osteoporosis  In such patients, clinical judgment must be exercised in interpreting FRAX scores  -Prior hip, vertebral and humeral fragility fractures appear to confer greater risk of subsequent fracture than fractures at other sites (this is especially true for individuals with severe vertebral fractures), but quantification of this incremental risk is not possible with FRAX  -FRAX underestimates fracture risk in patients with history of multiple fragility fractures  -FRAX may underestimate fracture risk in patients with history of frequent falls  -It is not appropriate to use FRAX to monitor treatment response  WHO CLASSIFICATION: Normal (a T-score of -1 0 or higher) Low bone mineral density (a T-score of less than -1 0 but higher than -2 5) Osteoporosis (a T-score of -2 5 or less) Severe osteoporosis (a T-score of -2 5 or less with a fragility fracture) LEAST SIGNIFICANT CHANGE (AT 95% C  I): Lumbar spine: 0 022; 2 2% Total hip: 0 017 g/cm2; 1 9% Forearm: 0 032 g/cm2; 5 0% Workstation performed: [id number]     Procedure: Mammo screening bilateral w 3d & cad    Result Date: 2/18/2022  Narrative: DIAGNOSIS: Encounter for screening mammogram for breast cancer TECHNIQUE: Digital screening mammography was performed  Computer Aided Detection (CAD) analyzed all applicable images  COMPARISONS: Prior breast imaging dated: 10/04/2019, 09/14/2018, 05/01/2017, 04/08/2016, 02/11/2015, and 01/02/2014 RELEVANT HISTORY: Family Breast Cancer History: History of breast cancer in Maternal Aunt  Family Medical History: Family medical history includes breast cancer in maternal aunt  Personal History: No known relevant hormone history  Surgical history includes hysterectomy  No known relevant medical history  The patient is scheduled in a reminder system for screening mammography  8-10% of cancers will be missed on mammography   Management of a palpable abnormality must be based on clinical grounds  Patients will be notified of their results via letter from our facility  Accredited by [employer] of Radiology and FDA  RISK ASSESSMENT: 5 Year Tyrer-Cuzick: 1 68 % 10 Year Tyrer-Cuzick: No Score Lifetime Tyrer-Cuzick: 2 77 % TISSUE DENSITY: There are scattered areas of fibroglandular density  INDICATION: Margaret Bailey is a 67 y o  female presenting for screening mammography  FINDINGS: There are no suspicious masses, grouped microcalcifications or areas of architectural distortion  The skin and nipple areolar complex are unremarkable  Impression: No mammographic evidence of malignancy  ASSESSMENT/BI-RADS CATEGORY: Left: 1 - Negative Right: 1 - Negative Overall: 1 - Negative RECOMMENDATION:      - Routine screening mammogram in 1 year for both breasts  Workstation ID: [id number]     Procedure: Cardiac EP device report    Result Date: 3/31/2022  Narrative: MDT-DUAL CHAMBER PPM (AAIR-DDDR MODE)/ ACTIVE SYSTEM IS MRI CONDITIONAL N/B; CARELINK ALERT TRANSMISSION: PERSISTENT AF REMAINS IN PROGRESS (100% BURDEN)/EPISODE REPORTED 3/30/22  - INTERMITTENT RVR NOTED  PATIENT TAKES WARFARIN, AMIODORONE, METOPROLOL SUCC  BATTERY VOLTAGE ADEQUATE (13 YRS)  AP 0 3%  3 1% NORMAL DEVICE FUNCTION  ES     Procedure: Cardiac EP device report    Result Date: 3/30/2022  Narrative: MDT-DUAL CHAMBER PPM (AAIR-DDDR MODE)/ ACTIVE SYSTEM IS MRI CONDITIONAL NB/PT CONCERN-CARELINK TRANSMISSION: 1 AT/AF NOTED; PRESENTLY IN AF SINCE 8 AM TODAY  BATTERY STATUS "13 YRS " AP 0%  1 3%  ALL AVAILABLE LEAD PARAMETERS WITHIN NORMAL LIMITS  NC     Procedure: Cardiac EP device report    Result Date: 3/14/2022  Narrative: MDT-DUAL CHAMBER PPM (AAIR-DDDR MODE)/ ACTIVE SYSTEM IS MRI CONDITIONAL CARELINK TRANSMISSION: BATTERY VOLTAGE ADEQUATE (13 YRS)  AP 81 4%  <0 1% (AAIR-DDDR 60); ALL AVAILABLE LEAD PARAMETERS WITHIN NORMAL LIMITS  1 AT/AF EPISODE TREATED WITH UNSUCCESSFULLY WITH rATP (0/1-0%)   S/P DC CV ON 3/11/22 & PATIENT TAKES ELIQUIS, AMIODORONE, METOPROLOL SUCC  NORMAL DEVICE FUNCTION  ES     Procedure: Cardiac EP device report    Result Date: 2/24/2022  Narrative: MDT-DUAL CHAMBER PPM (AAIR-DDDR MODE)/ ACTIVE SYSTEM IS MRI CONDITIONAL CARELINK TRANSMISSION- NB: PT C/O WAKING UP W/ PALPITATIONS  AF/VS~110 BPM ON CURRENT EGM  3 AF EPISODES TREATED UNSUCCESSFULLY W/ REACTIVE ATP & 1 MONITORED AF EPISODE  PT IN AF SINCE 08:21 TODAY  AF BURDEN-0 2%  PT ON ELIQUIS, AMIO & METOPROLOL  PER CC, CRNP, INSTRUCTED PT TO TAKE AN ADDITIONAL TOPROL XL 25 MG NOW & TO CALL TO UPDATE TOMORROW OR IF WORSENS, THIS AFTERNOON  PT VERBALIZED UNDERSTANDING  BATTERY VOLTAGE ADEQUATE (13 YRS)  AP-62%, <0 1%  ALL AVAILABLE LEAD PARAMETERS WITHIN NORMAL LIMITS  NORMAL DEVICE FUNCTION  GV     Procedure: Cardioversion    Result Date: 3/11/2022  Narrative: Cardioversion Amena Guardado is a 67 y o  female who is referred for electrical cardioversion  Indication for procedure: Cardioversion Requesting physician: Jj Gilmore Anticoagulation: Eliquis The patient was identified in the GI lab  A time out procedure was performed  The patient was sedated by the anesthesia service with propofol  The patient was cardioverted to sinus rhythm with a single 200 J biphasic shock  Patient has a Medtronic pacemaker, which was interrogated before and after the cardioversion, there were no changes to parameters  Sinus rhythm was confirmed post cardioversion  There were no complications  The patient was monitored for the appropriate time interval in the holding area, and was transferred back to the medical floor in stable condition  Procedure: Echo complete w/ contrast if indicated    Result Date: 5/16/2022  Narrative:   Left Ventricle: Left ventricular cavity size is normal  Wall thickness is mildly increased  There is moderate concentric hypertrophy  The left ventricular ejection fraction is 45%  Systolic function is mildly reduced   There is mild global hypokinesis  Right Ventricle: Right ventricular cavity size is mildly dilated  Systolic function is mildly reduced  Left Atrium: The atrium is mildly dilated  Right Atrium: The atrium is mildly dilated  Mitral Valve: There is mild annular calcification  There is mild to moderate regurgitation  Tricuspid Valve: There is moderate regurgitation  The right ventricular systolic pressure is severely elevated  The estimated right ventricular systolic pressure is 86 79 mmHg

## 2022-06-01 NOTE — TELEPHONE ENCOUNTER
Patient is monitoring her BP and reporting results to cardiology  She has an appointment with neuro at [facility] on 6/29/22  States that the pressure she feels in head is not daily only occasionally

## 2022-06-07 ENCOUNTER — TELEPHONE (OUTPATIENT)
Dept: CARDIOLOGY CLINIC | Facility: CLINIC | Age: 77
End: 2022-06-07

## 2022-06-07 ENCOUNTER — OFFICE VISIT (OUTPATIENT)
Dept: FAMILY MEDICINE CLINIC | Facility: CLINIC | Age: 77
End: 2022-06-07
Payer: MEDICARE

## 2022-06-07 VITALS
BODY MASS INDEX: 31.65 KG/M2 | SYSTOLIC BLOOD PRESSURE: 120 MMHG | OXYGEN SATURATION: 98 % | TEMPERATURE: 97.9 F | DIASTOLIC BLOOD PRESSURE: 84 MMHG | HEIGHT: 62 IN | WEIGHT: 172 LBS | RESPIRATION RATE: 16 BRPM | HEART RATE: 72 BPM

## 2022-06-07 DIAGNOSIS — D32.9 MENINGIOMA (HCC): ICD-10-CM

## 2022-06-07 DIAGNOSIS — C73 MALIGNANT NEOPLASM OF THYROID GLAND (HCC): ICD-10-CM

## 2022-06-07 DIAGNOSIS — I50.32 CHRONIC DIASTOLIC CHF (CONGESTIVE HEART FAILURE) (HCC): ICD-10-CM

## 2022-06-07 DIAGNOSIS — N18.32 STAGE 3B CHRONIC KIDNEY DISEASE (HCC): ICD-10-CM

## 2022-06-07 DIAGNOSIS — R53.83 FATIGUE, UNSPECIFIED TYPE: ICD-10-CM

## 2022-06-07 DIAGNOSIS — R42 DIZZINESS: Primary | ICD-10-CM

## 2022-06-07 DIAGNOSIS — R26.9 GAIT DISTURBANCE: ICD-10-CM

## 2022-06-07 DIAGNOSIS — Z95.0 S/P PLACEMENT OF CARDIAC PACEMAKER: ICD-10-CM

## 2022-06-07 DIAGNOSIS — I10 ESSENTIAL HYPERTENSION: ICD-10-CM

## 2022-06-07 DIAGNOSIS — F11.20 CONTINUOUS OPIOID DEPENDENCE (HCC): ICD-10-CM

## 2022-06-07 DIAGNOSIS — I48.0 PAROXYSMAL ATRIAL FIBRILLATION (HCC): ICD-10-CM

## 2022-06-07 PROCEDURE — 99215 OFFICE O/P EST HI 40 MIN: CPT | Performed by: FAMILY MEDICINE

## 2022-06-07 NOTE — TELEPHONE ENCOUNTER
P/c'd , she has been taking amiodarone 200 mg bid since at least April 27, 2022  Advised she should being only doing daily and told her not to take any more today  Should she just continue now with daily?       Please advise

## 2022-06-07 NOTE — TELEPHONE ENCOUNTER
Spoke with patient and instructed her also to remain on Amiodarone 200mg once daily only, per Lester Mayorga  Patient states that she is having a virtual visit with a physician at [facility] and is scheduled for an ablation 6/14/22  She went for a 2nd opinion as she has not been feeling well, very SOB, headaches

## 2022-06-07 NOTE — PROGRESS NOTES
Assessment/Plan:         Diagnoses and all orders for this visit:    Dizziness    Fatigue, unspecified type    Gait disturbance  -     Vitamin B12    Meningioma (HCC)    Paroxysmal atrial fibrillation (HCC)  -     TSH, 3rd generation with Free T4 reflex    Chronic diastolic CHF (congestive heart failure) (HCC)    Essential hypertension  -     Comprehensive metabolic panel  -     CBC and differential    Stage 3b chronic kidney disease (HCC)    Continuous opioid dependence (HCC)    Malignant neoplasm of thyroid gland (HCC)    S/P placement of cardiac pacemaker          Repeat labs  Monitor BP at home  Patient to review reducing dose of Amiodarone dose at Cardiology appointment 06/08/022  She completed PT for gait/balance  2nd opinions at [facility] Cardiology and Neurosurgery  Time of visit 40 minutes        Patient ID: Jeri Alvares is a 67 y o  female  Follow up visit  Here with her son in law  Multiple concerns  Dizziness, unsteady  2 recent falls  + fatigue  Episodes of blurry vision/double vision  Recent eye exam  She has been wearing prisms in her glasses for > 1 year  Short of breath with activity  3 lb weight gain over the last month  04/2022 MRI brain 4 2 x 1 7 x 3 0 cm fairly homogeneously enhancing, extra-axial mass within the inferior lateral aspect of the left anterior cranial fossa  Mild adjacent dural thickening and enhancement is seen extending inferiorly into the middle cranial fossa and superiorly into the dura of the anterior frontal vertex  Findings are most suggestive of meningioma  Neurosurgical evaluation at [facility] 05/2022  Awaiting 2nd opinion from [facility]  Current medications reviewed  AF She has been on Amiodarone 200 mg BID since March 2022  Chart indicates she was suppose to be on this dose for 3 weeks  05/2022 echo  Left Ventricle: Left ventricular cavity size is normal  Wall thickness is mildly increased  There is moderate concentric hypertrophy   The left ventricular ejection fraction is 00%  Systolic function is mildly reduced  There is mild global hypokinesis  Right Ventricle: Right ventricular cavity size is mildly dilated  Systolic function is mildly reduced  Left Atrium: The atrium is mildly dilated  Right Atrium: The atrium is mildly dilated  Mitral Valve: There is mild annular calcification  There is mild to moderate regurgitation  Tricuspid Valve: There is moderate regurgitation  The right ventricular systolic pressure is severely elevated  The estimated right ventricular systolic pressure is 16 54 mmHg  Labs 04/2022 reviewed  03/2022 and 01/2022 s/p cardio versions  03/2021 admission for atrial fibrillation status post ablation procedure  Pre procedure episode of bradycardia with QT prolongation and torsades successfully defibrillated  Subsequent pacemaker placed  Hypertension blood pressure reviewed from home reviewed  BP has been labile-today 120/84   On Losartan 75 mg daily and Metoprolol  mg BID     Lab Results   Component Value Date    WBC 9 69 04/01/2022    HGB 15 2 04/01/2022    HCT 46 2 (H) 04/01/2022    MCV 96 04/01/2022     04/01/2022     Lab Results   Component Value Date     05/06/2015    SODIUM 131 (L) 04/01/2022    K 4 6 04/01/2022    CL 97 (L) 04/01/2022    CO2 26 04/01/2022    ANIONGAP 9 05/06/2015    AGAP 8 04/01/2022    BUN 37 (H) 04/01/2022    CREATININE 1 22 04/01/2022    GLUC 128 04/01/2022    GLUF 91 10/27/2021    CALCIUM 9 1 04/01/2022    AST 17 04/01/2022    ALT 28 04/01/2022    ALKPHOS 70 04/01/2022    PROT 7 0 05/06/2015    TP 7 9 04/01/2022    BILITOT 0 78 05/06/2015    TBILI 0 79 04/01/2022    EGFR 42 04/01/2022     Lab Results   Component Value Date    [id number] 0 672 04/01/2022     Lab Results   Component Value Date    DDIMER <0 27 04/01/2022     Lab Results   Component Value Date    NTBNP 761 (H) 01/08/2021                  The following portions of the patient's history were reviewed and updated as appropriate: allergies, current medications, past family history, past medical history, past social history, past surgical history and problem list     Review of Systems   Constitutional: Negative for appetite change, chills, fatigue, fever and unexpected weight change  HENT: Positive for hearing loss (bilateral hearing aids )  Negative for congestion, ear pain, rhinorrhea, sore throat and trouble swallowing  Eyes: Negative for visual disturbance  Respiratory: Positive for shortness of breath  Negative for cough and wheezing  Cardiovascular: Positive for palpitations  Negative for chest pain and leg swelling  See HPI  Gastrointestinal: Negative for abdominal pain, blood in stool, constipation, diarrhea, nausea and vomiting  Colonoscopy 04/2018 Two benign polyps  Mild diverticulosis left-sided colon  Endocrine: Negative for polydipsia and polyuria  12/2020 status post left thyroid lobectomy for left thyroid nodule  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  09/2018 DEXA scan T-score -1 5 left femoral neck  On vitamin D supplement    Lab Results       Component                Value               Date                       [id number]             0 672               04/01/2022                           Genitourinary: Negative for difficulty urinating  Musculoskeletal: Positive for arthralgias and myalgias  OA shoulders/ she is using prn Tylenol Arthritis and Tramadol   07/2019 x-rays right shoulder mild degenerative arthritis right AC joint  Left shoulder mild degenerative changes left AC joint  08/2019 bilateral shoulder intra-articular steroid injections via fluoroscopy with symptomatic relief  S/p bilateral TKR  Skin: Negative for rash  Neurological: Positive for tremors  Negative for dizziness and headaches  RLS on Requip  04/2021 neurology evaluation for tremor suspected benign essential  tremor    She is currently on a beta-blocker  Family history + essential tremor brother  Hematological: Negative for adenopathy  Bruises/bleeds easily (on Eliquis )  Lab Results       Component                Value               Date                       WBC                      9 69                04/01/2022                 HGB                      15 2                04/01/2022                 HCT                      46 2 (H)            04/01/2022                 MCV                      96                  04/01/2022                 PLT                      324                 04/01/2022                      Lab Results       Component                Value               Date                       [id number]               399                 04/01/2021              MMA elevated at 489   Psychiatric/Behavioral: Positive for dysphoric mood  Negative for sleep disturbance  The patient is nervous/anxious  Stable on low dose Duloxetine 30 mg/day  Chronic insomnia on prn Zolpidem 10 mg         Objective:      /84   Pulse 72   Temp 97 9 °F (36 6 °C)   Resp 16   Ht 5' 2" (1 575 m)   Wt 78 kg (172 lb)   LMP 02/01/1990 (Within Weeks)   SpO2 98%   BMI 31 46 kg/m²     BP Readings from Last 3 Encounters:   06/07/22 120/84   05/16/22 142/82   05/09/22 142/82       Wt Readings from Last 3 Encounters:   06/07/22 78 kg (172 lb)   05/16/22 76 7 kg (169 lb)   05/09/22 76 7 kg (169 lb 1 6 oz)        Physical Exam  Vitals and nursing note reviewed  Constitutional:       General: She is not in acute distress  Appearance: She is well-developed  HENT:      Right Ear: Tympanic membrane and ear canal normal       Left Ear: Tympanic membrane and ear canal normal    Eyes:      General: No scleral icterus  Extraocular Movements: Extraocular movements intact  Right eye: Nystagmus (nystagmus R lateral gaze ) present        Conjunctiva/sclera: Conjunctivae normal       Pupils: Pupils are equal, round, and reactive to light  Neck:      Thyroid: No thyroid mass or thyromegaly  Vascular: No carotid bruit or JVD  Trachea: No tracheal deviation  Cardiovascular:      Rate and Rhythm: Normal rate and regular rhythm  Heart sounds: Normal heart sounds  No murmur heard  No gallop  Pulmonary:      Effort: Pulmonary effort is normal  No respiratory distress  Breath sounds: Normal breath sounds  No wheezing or rales  Chest:   Breasts:      Right: No supraclavicular adenopathy  Left: No supraclavicular adenopathy  Abdominal:      General: Bowel sounds are normal  There is no distension or abdominal bruit  Palpations: Abdomen is soft  There is no hepatomegaly, splenomegaly or mass  Tenderness: There is no abdominal tenderness  There is no guarding or rebound  Musculoskeletal:      Right lower leg: No edema  Left lower leg: No edema  Lymphadenopathy:      Cervical: No cervical adenopathy  Upper Body:      Right upper body: No supraclavicular adenopathy  Left upper body: No supraclavicular adenopathy  Skin:     Findings: Rash present  Nails: There is no clubbing  Comments: Petechiae LEs  Neurological:      General: No focal deficit present  Mental Status: She is alert and oriented to person, place, and time  Cranial Nerves: Cranial nerves are intact  Motor: No weakness, tremor or abnormal muscle tone  Coordination: Romberg sign negative  Gait: Gait normal       Comments: Negative Reid sign   No ankle clonus    Psychiatric:         Mood and Affect: Mood normal          Behavior: Behavior normal

## 2022-06-09 DIAGNOSIS — M17.11 PRIMARY OSTEOARTHRITIS OF RIGHT KNEE: ICD-10-CM

## 2022-06-09 DIAGNOSIS — E78.00 HYPERCHOLESTEREMIA: ICD-10-CM

## 2022-06-09 NOTE — TELEPHONE ENCOUNTER
05/09/2022 02/11/2022 traMADol HCL (Tablet)  60 0 30 50 MG 10 0 [address] [facility] [facility]  [place]

## 2022-06-10 RX ORDER — EZETIMIBE 10 MG/1
10 TABLET ORAL DAILY
Qty: 90 TABLET | Refills: 3 | Status: SHIPPED | OUTPATIENT
Start: 2022-06-10

## 2022-06-10 RX ORDER — TRAMADOL HYDROCHLORIDE 50 MG/1
50 TABLET ORAL 2 TIMES DAILY PRN
Qty: 60 TABLET | Refills: 3 | Status: SHIPPED | OUTPATIENT
Start: 2022-06-10

## 2022-06-13 ENCOUNTER — APPOINTMENT (EMERGENCY)
Dept: RADIOLOGY | Facility: HOSPITAL | Age: 77
DRG: 291 | End: 2022-06-13
Payer: MEDICARE

## 2022-06-13 ENCOUNTER — HOSPITAL ENCOUNTER (INPATIENT)
Facility: HOSPITAL | Age: 77
LOS: 4 days | Discharge: HOME HEALTH | DRG: 291 | End: 2022-06-17
Attending: EMERGENCY MEDICINE | Admitting: INTERNAL MEDICINE
Payer: MEDICARE

## 2022-06-13 ENCOUNTER — TELEPHONE (OUTPATIENT)
Dept: FAMILY MEDICINE CLINIC | Facility: CLINIC | Age: 77
End: 2022-06-13

## 2022-06-13 ENCOUNTER — APPOINTMENT (OUTPATIENT)
Dept: LAB | Facility: MEDICAL CENTER | Age: 77
DRG: 291 | End: 2022-06-13
Payer: MEDICARE

## 2022-06-13 DIAGNOSIS — R06.02 SHORTNESS OF BREATH: ICD-10-CM

## 2022-06-13 DIAGNOSIS — J90 PLEURAL EFFUSION: ICD-10-CM

## 2022-06-13 DIAGNOSIS — I50.9 ACUTE HEART FAILURE (HCC): ICD-10-CM

## 2022-06-13 DIAGNOSIS — M47.816 LUMBAR SPONDYLOSIS: ICD-10-CM

## 2022-06-13 DIAGNOSIS — M79.7 FIBROMYALGIA: ICD-10-CM

## 2022-06-13 DIAGNOSIS — I48.0 PAROXYSMAL ATRIAL FIBRILLATION (HCC): ICD-10-CM

## 2022-06-13 DIAGNOSIS — E87.1 HYPONATREMIA: Primary | ICD-10-CM

## 2022-06-13 DIAGNOSIS — I10 ESSENTIAL HYPERTENSION: ICD-10-CM

## 2022-06-13 DIAGNOSIS — R26.89 IMBALANCE: ICD-10-CM

## 2022-06-13 PROBLEM — R79.89 ELEVATED SERUM CREATININE: Status: ACTIVE | Noted: 2022-06-13

## 2022-06-13 PROBLEM — E83.42 HYPOMAGNESEMIA: Status: ACTIVE | Noted: 2022-06-13

## 2022-06-13 PROBLEM — E80.6 HYPERBILIRUBINEMIA: Status: ACTIVE | Noted: 2022-06-13

## 2022-06-13 LAB
2HR DELTA HS TROPONIN: -1 NG/L
4HR DELTA HS TROPONIN: -1 NG/L
ALBUMIN SERPL BCP-MCNC: 3.5 G/DL (ref 3.5–5)
ALBUMIN SERPL BCP-MCNC: 3.8 G/DL (ref 3.5–5)
ALP SERPL-CCNC: 68 U/L (ref 34–104)
ALP SERPL-CCNC: 76 U/L (ref 46–116)
ALT SERPL W P-5'-P-CCNC: 50 U/L (ref 7–52)
ALT SERPL W P-5'-P-CCNC: 70 U/L (ref 12–78)
ANION GAP SERPL CALCULATED.3IONS-SCNC: 10 MMOL/L (ref 4–13)
ANION GAP SERPL CALCULATED.3IONS-SCNC: 4 MMOL/L (ref 4–13)
AST SERPL W P-5'-P-CCNC: 38 U/L (ref 13–39)
AST SERPL W P-5'-P-CCNC: 51 U/L (ref 5–45)
BASOPHILS # BLD AUTO: 0.06 THOUSANDS/ΜL (ref 0–0.1)
BASOPHILS # BLD AUTO: 0.07 THOUSANDS/ΜL (ref 0–0.1)
BASOPHILS NFR BLD AUTO: 1 % (ref 0–1)
BASOPHILS NFR BLD AUTO: 1 % (ref 0–1)
BILIRUB DIRECT SERPL-MCNC: 0.38 MG/DL (ref 0–0.2)
BILIRUB SERPL-MCNC: 1.66 MG/DL (ref 0.2–1)
BILIRUB SERPL-MCNC: 1.79 MG/DL (ref 0.2–1)
BNP SERPL-MCNC: 928 PG/ML (ref 0–100)
BUN SERPL-MCNC: 20 MG/DL (ref 5–25)
BUN SERPL-MCNC: 23 MG/DL (ref 5–25)
CALCIUM SERPL-MCNC: 9.1 MG/DL (ref 8.4–10.2)
CALCIUM SERPL-MCNC: 9.5 MG/DL (ref 8.3–10.1)
CARDIAC TROPONIN I PNL SERPL HS: 6 NG/L
CARDIAC TROPONIN I PNL SERPL HS: 6 NG/L
CARDIAC TROPONIN I PNL SERPL HS: 7 NG/L
CHLORIDE SERPL-SCNC: 93 MMOL/L (ref 96–108)
CHLORIDE SERPL-SCNC: 94 MMOL/L (ref 100–108)
CO2 SERPL-SCNC: 25 MMOL/L (ref 21–32)
CO2 SERPL-SCNC: 27 MMOL/L (ref 21–32)
CREAT SERPL-MCNC: 1.08 MG/DL (ref 0.6–1.3)
CREAT SERPL-MCNC: 1.34 MG/DL (ref 0.6–1.3)
EOSINOPHIL # BLD AUTO: 0.19 THOUSAND/ΜL (ref 0–0.61)
EOSINOPHIL # BLD AUTO: 0.19 THOUSAND/ΜL (ref 0–0.61)
EOSINOPHIL NFR BLD AUTO: 3 % (ref 0–6)
EOSINOPHIL NFR BLD AUTO: 3 % (ref 0–6)
ERYTHROCYTE [DISTWIDTH] IN BLOOD BY AUTOMATED COUNT: 13.3 % (ref 11.6–15.1)
ERYTHROCYTE [DISTWIDTH] IN BLOOD BY AUTOMATED COUNT: 13.4 % (ref 11.6–15.1)
GFR SERPL CREATININE-BSD FRML MDRD: 38 ML/MIN/1.73SQ M
GFR SERPL CREATININE-BSD FRML MDRD: 49 ML/MIN/1.73SQ M
GLUCOSE P FAST SERPL-MCNC: 117 MG/DL (ref 65–99)
GLUCOSE SERPL-MCNC: 129 MG/DL (ref 65–140)
HCT VFR BLD AUTO: 36.4 % (ref 34.8–46.1)
HCT VFR BLD AUTO: 38.9 % (ref 34.8–46.1)
HGB BLD-MCNC: 12.1 G/DL (ref 11.5–15.4)
HGB BLD-MCNC: 12.7 G/DL (ref 11.5–15.4)
IMM GRANULOCYTES # BLD AUTO: 0.02 THOUSAND/UL (ref 0–0.2)
IMM GRANULOCYTES # BLD AUTO: 0.03 THOUSAND/UL (ref 0–0.2)
IMM GRANULOCYTES NFR BLD AUTO: 0 % (ref 0–2)
IMM GRANULOCYTES NFR BLD AUTO: 0 % (ref 0–2)
LYMPHOCYTES # BLD AUTO: 1.13 THOUSANDS/ΜL (ref 0.6–4.47)
LYMPHOCYTES # BLD AUTO: 1.38 THOUSANDS/ΜL (ref 0.6–4.47)
LYMPHOCYTES NFR BLD AUTO: 16 % (ref 14–44)
LYMPHOCYTES NFR BLD AUTO: 21 % (ref 14–44)
MAGNESIUM SERPL-MCNC: 1.7 MG/DL (ref 1.9–2.7)
MCH RBC QN AUTO: 31.4 PG (ref 26.8–34.3)
MCH RBC QN AUTO: 31.6 PG (ref 26.8–34.3)
MCHC RBC AUTO-ENTMCNC: 32.6 G/DL (ref 31.4–37.4)
MCHC RBC AUTO-ENTMCNC: 33.2 G/DL (ref 31.4–37.4)
MCV RBC AUTO: 95 FL (ref 82–98)
MCV RBC AUTO: 96 FL (ref 82–98)
MONOCYTES # BLD AUTO: 0.66 THOUSAND/ΜL (ref 0.17–1.22)
MONOCYTES # BLD AUTO: 0.77 THOUSAND/ΜL (ref 0.17–1.22)
MONOCYTES NFR BLD AUTO: 10 % (ref 4–12)
MONOCYTES NFR BLD AUTO: 11 % (ref 4–12)
NEUTROPHILS # BLD AUTO: 4.3 THOUSANDS/ΜL (ref 1.85–7.62)
NEUTROPHILS # BLD AUTO: 4.88 THOUSANDS/ΜL (ref 1.85–7.62)
NEUTS SEG NFR BLD AUTO: 64 % (ref 43–75)
NEUTS SEG NFR BLD AUTO: 70 % (ref 43–75)
NRBC BLD AUTO-RTO: 0 /100 WBCS
NRBC BLD AUTO-RTO: 0 /100 WBCS
PLATELET # BLD AUTO: 275 THOUSANDS/UL (ref 149–390)
PLATELET # BLD AUTO: 289 THOUSANDS/UL (ref 149–390)
PMV BLD AUTO: 10 FL (ref 8.9–12.7)
PMV BLD AUTO: 10.5 FL (ref 8.9–12.7)
POTASSIUM SERPL-SCNC: 4.1 MMOL/L (ref 3.5–5.3)
POTASSIUM SERPL-SCNC: 4.4 MMOL/L (ref 3.5–5.3)
PROCALCITONIN SERPL-MCNC: 0.13 NG/ML
PROT SERPL-MCNC: 6.6 G/DL (ref 6.4–8.4)
PROT SERPL-MCNC: 7.2 G/DL (ref 6.4–8.2)
RBC # BLD AUTO: 3.83 MILLION/UL (ref 3.81–5.12)
RBC # BLD AUTO: 4.04 MILLION/UL (ref 3.81–5.12)
SODIUM SERPL-SCNC: 125 MMOL/L (ref 136–145)
SODIUM SERPL-SCNC: 128 MMOL/L (ref 135–147)
TSH SERPL DL<=0.05 MIU/L-ACNC: 1.64 UIU/ML (ref 0.45–4.5)
TSH SERPL DL<=0.05 MIU/L-ACNC: 2.01 UIU/ML (ref 0.45–4.5)
WBC # BLD AUTO: 6.73 THOUSAND/UL (ref 4.31–10.16)
WBC # BLD AUTO: 6.95 THOUSAND/UL (ref 4.31–10.16)

## 2022-06-13 PROCEDURE — 85025 COMPLETE CBC W/AUTO DIFF WBC: CPT | Performed by: FAMILY MEDICINE

## 2022-06-13 PROCEDURE — 83880 ASSAY OF NATRIURETIC PEPTIDE: CPT | Performed by: EMERGENCY MEDICINE

## 2022-06-13 PROCEDURE — 36415 COLL VENOUS BLD VENIPUNCTURE: CPT | Performed by: FAMILY MEDICINE

## 2022-06-13 PROCEDURE — 84443 ASSAY THYROID STIM HORMONE: CPT | Performed by: FAMILY MEDICINE

## 2022-06-13 PROCEDURE — 83735 ASSAY OF MAGNESIUM: CPT | Performed by: EMERGENCY MEDICINE

## 2022-06-13 PROCEDURE — 99223 1ST HOSP IP/OBS HIGH 75: CPT | Performed by: INTERNAL MEDICINE

## 2022-06-13 PROCEDURE — 71045 X-RAY EXAM CHEST 1 VIEW: CPT

## 2022-06-13 PROCEDURE — 99285 EMERGENCY DEPT VISIT HI MDM: CPT

## 2022-06-13 PROCEDURE — 84145 PROCALCITONIN (PCT): CPT | Performed by: INTERNAL MEDICINE

## 2022-06-13 PROCEDURE — 84484 ASSAY OF TROPONIN QUANT: CPT | Performed by: EMERGENCY MEDICINE

## 2022-06-13 PROCEDURE — 84443 ASSAY THYROID STIM HORMONE: CPT | Performed by: EMERGENCY MEDICINE

## 2022-06-13 PROCEDURE — 80048 BASIC METABOLIC PNL TOTAL CA: CPT

## 2022-06-13 PROCEDURE — 82248 BILIRUBIN DIRECT: CPT

## 2022-06-13 PROCEDURE — 99285 EMERGENCY DEPT VISIT HI MDM: CPT | Performed by: EMERGENCY MEDICINE

## 2022-06-13 PROCEDURE — 85025 COMPLETE CBC W/AUTO DIFF WBC: CPT | Performed by: EMERGENCY MEDICINE

## 2022-06-13 PROCEDURE — 84484 ASSAY OF TROPONIN QUANT: CPT

## 2022-06-13 PROCEDURE — 80053 COMPREHEN METABOLIC PANEL: CPT | Performed by: EMERGENCY MEDICINE

## 2022-06-13 PROCEDURE — 80053 COMPREHEN METABOLIC PANEL: CPT | Performed by: FAMILY MEDICINE

## 2022-06-13 PROCEDURE — 93005 ELECTROCARDIOGRAM TRACING: CPT

## 2022-06-13 RX ORDER — LOSARTAN POTASSIUM 25 MG/1
25 TABLET ORAL DAILY
Status: DISCONTINUED | OUTPATIENT
Start: 2022-06-14 | End: 2022-06-13

## 2022-06-13 RX ORDER — MAGNESIUM SULFATE HEPTAHYDRATE 40 MG/ML
2 INJECTION, SOLUTION INTRAVENOUS ONCE
Status: COMPLETED | OUTPATIENT
Start: 2022-06-13 | End: 2022-06-13

## 2022-06-13 RX ORDER — FLUTICASONE PROPIONATE 50 MCG
2 SPRAY, SUSPENSION (ML) NASAL DAILY
Status: DISCONTINUED | OUTPATIENT
Start: 2022-06-14 | End: 2022-06-17 | Stop reason: HOSPADM

## 2022-06-13 RX ORDER — ACETAMINOPHEN 325 MG/1
650 TABLET ORAL EVERY 4 HOURS PRN
Status: DISCONTINUED | OUTPATIENT
Start: 2022-06-13 | End: 2022-06-17 | Stop reason: HOSPADM

## 2022-06-13 RX ORDER — ASCORBIC ACID 500 MG
500 TABLET ORAL DAILY
Status: DISCONTINUED | OUTPATIENT
Start: 2022-06-14 | End: 2022-06-17 | Stop reason: HOSPADM

## 2022-06-13 RX ORDER — IPRATROPIUM BROMIDE 21 UG/1
2 SPRAY, METERED NASAL 3 TIMES DAILY
Status: DISCONTINUED | OUTPATIENT
Start: 2022-06-13 | End: 2022-06-13 | Stop reason: CLARIF

## 2022-06-13 RX ORDER — LOSARTAN POTASSIUM 50 MG/1
50 TABLET ORAL DAILY
Status: DISCONTINUED | OUTPATIENT
Start: 2022-06-14 | End: 2022-06-13

## 2022-06-13 RX ORDER — METOPROLOL SUCCINATE 100 MG/1
100 TABLET, EXTENDED RELEASE ORAL EVERY 12 HOURS SCHEDULED
Status: DISCONTINUED | OUTPATIENT
Start: 2022-06-13 | End: 2022-06-14

## 2022-06-13 RX ORDER — FUROSEMIDE 10 MG/ML
40 INJECTION INTRAMUSCULAR; INTRAVENOUS ONCE
Status: DISCONTINUED | OUTPATIENT
Start: 2022-06-14 | End: 2022-06-13

## 2022-06-13 RX ORDER — EZETIMIBE 10 MG/1
10 TABLET ORAL DAILY
Status: DISCONTINUED | OUTPATIENT
Start: 2022-06-14 | End: 2022-06-17

## 2022-06-13 RX ORDER — AMIODARONE HYDROCHLORIDE 200 MG/1
200 TABLET ORAL DAILY
Status: DISCONTINUED | OUTPATIENT
Start: 2022-06-14 | End: 2022-06-17 | Stop reason: HOSPADM

## 2022-06-13 RX ORDER — FUROSEMIDE 10 MG/ML
20 INJECTION INTRAMUSCULAR; INTRAVENOUS ONCE
Status: COMPLETED | OUTPATIENT
Start: 2022-06-13 | End: 2022-06-13

## 2022-06-13 RX ORDER — OMEGA-3S/DHA/EPA/FISH OIL/D3 300MG-1000
400 CAPSULE ORAL DAILY
Status: DISCONTINUED | OUTPATIENT
Start: 2022-06-14 | End: 2022-06-17 | Stop reason: HOSPADM

## 2022-06-13 RX ORDER — ZOLPIDEM TARTRATE 5 MG/1
10 TABLET ORAL
Status: DISCONTINUED | OUTPATIENT
Start: 2022-06-13 | End: 2022-06-15

## 2022-06-13 RX ORDER — FAMOTIDINE 20 MG/1
20 TABLET, FILM COATED ORAL EVERY OTHER DAY
Status: DISCONTINUED | OUTPATIENT
Start: 2022-06-13 | End: 2022-06-17 | Stop reason: HOSPADM

## 2022-06-13 RX ORDER — FUROSEMIDE 10 MG/ML
40 INJECTION INTRAMUSCULAR; INTRAVENOUS ONCE
Status: COMPLETED | OUTPATIENT
Start: 2022-06-14 | End: 2022-06-14

## 2022-06-13 RX ADMIN — ZOLPIDEM TARTRATE 10 MG: 5 TABLET ORAL at 22:28

## 2022-06-13 RX ADMIN — METOPROLOL SUCCINATE 100 MG: 100 TABLET, EXTENDED RELEASE ORAL at 20:13

## 2022-06-13 RX ADMIN — APIXABAN 5 MG: 5 TABLET, FILM COATED ORAL at 20:13

## 2022-06-13 RX ADMIN — FAMOTIDINE 20 MG: 20 TABLET ORAL at 20:13

## 2022-06-13 RX ADMIN — FUROSEMIDE 20 MG: 10 INJECTION, SOLUTION INTRAMUSCULAR; INTRAVENOUS at 18:49

## 2022-06-13 RX ADMIN — MAGNESIUM SULFATE HEPTAHYDRATE 2 G: 40 INJECTION, SOLUTION INTRAVENOUS at 20:13

## 2022-06-13 NOTE — ASSESSMENT & PLAN NOTE
Wt Readings from Last 3 Encounters:   06/07/22 78 kg (172 lb)   05/16/22 76 7 kg (169 lb)   05/09/22 76 7 kg (169 lb 1 6 oz)     · POA: worsening SOB since 1-1 5 weeks, conversational dyspnea  Exam shows +1 pitting edema in b/l lower extremities, L>R  No JVD  Lungs CTA b/l     · BNP: 928   · CXR: CP angle blunting, pleural effusions on my read  · Last echo 5/16/21: EF 45%, Global hypokinesis, LA, RA dilated, TR (moderate)  · S/p IV lasix 20 mg in ED x1--> noted jump in Cr from 1 08-->1 34    Plan:  · IV Lasix 40 mg on 6/13 AM if Cr stable  · Encourage PO intake  · Cardiology consult given complex cardiac hx  · Strict I/Os  · Daily weights

## 2022-06-13 NOTE — H&P
[address] 1945, 67 y o  female MRN: [id number]  Unit/Bed#: FT 01 Encounter: [id number]  Primary Care Provider: Logan Warren MD   Date and time admitted to hospital: 6/13/2022  4:27 PM    * Acute heart failure [facility])  Assessment & Plan  Wt Readings from Last 3 Encounters:   06/07/22 78 kg (172 lb)   05/16/22 76 7 kg (169 lb)   05/09/22 76 7 kg (169 lb 1 6 oz)     POA: worsening SOB since 1-1 5 weeks, conversational dyspnea  Exam shows +1 pitting edema in b/l lower extremities, L>R  No JVD  Lungs CTA b/l     BNP: 928   CXR: CP angle blunting, pleural effusions on my read  Last echo 5/16/21: EF 45%, Global hypokinesis, LA, RA dilated, TR (moderate)  S/p IV lasix 20 mg in ED x1--> noted jump in Cr from 1 08-->1 34    Plan:  IV Lasix 40 mg on 6/14 AM if Cr stable  Encourage PO intake  Cardiology consult given complex cardiac hx  Strict I/Os  Daily weights      Hyponatremia  Assessment & Plan  POA Na: 125--> repeat 128 in setting of signs of volume overload w/ BNP of 928; reported confusion (as noticed by daughter), imbalance (since past few months) at home  S/p 1 dose IV Lasix 20 mg in ED    Plan:   Encourage PO intake  BMP q6h- Na correction rate: 6-12 mEq w/in 24 hrs  Hold losartan  Monitor Cr    Paroxysmal atrial fibrillation (HCC)  Assessment & Plan  Was supposed to have an ablation at [facility] tomorrow   EKG shows rate controlled Afib   On Toprol 100 mg QD, Amio 200 mg QD, Eliquis 5 mg BID    Plan:   Continue home meds  Monitor on telemetry    Hyperbilirubinemia  Assessment & Plan  Lab Results   Component Value Date    TBILI 1 66 (H) 06/13/2022    TBILI 1 79 (H) 06/13/2022    TBILI 0 79 04/01/2022     Benign abdominal exam  Pending  Monitor via CMP      Meningioma [facility])  Assessment & Plan  MRI brain in 4/22 demonstrated meningioma  Follows w/ Neurosurgery outpatient  Unlikely to be causing syx of imbalance, confusion  Non focal neuro exam- hold off on [facility] for now  Obtain CTH if GCS drops more than 2 pts in an hour or if pt has new deficits overnight    Elevated serum creatinine  Assessment & Plan  Noted s/p Lasix IV 20 mg X1  In setting of CKD    Plan:   Monitor Cr level  Encourage PO intake  Lasix 40 mg IV scheduled for 6/13 AM    Hypomagnesemia  Assessment & Plan  Mag 1 7   Repleted 2g Mag Sulfate IV  Monitor for electrolyte abnormalities      Insomnia  Assessment & Plan  Held Zolpidem for now given confusion complaints     Hyperlipidemia  Assessment & Plan  Continue Zetia 10 mg QD      Fibromyalgia  Assessment & Plan  On gabapentin 300 mg x2 QHS  Hold for now given complaints of confusion    Allergic rhinitis  Assessment & Plan  Fluticasone 2 sprays QD    Essential hypertension  Assessment & Plan  Home meds: Toprol 100 mg QD, Cozaar 75 mg QD    Plan:   Hold Cozaar in setting of rising Cr  Continue Toprol 100 mg QD  Monitor vitals       VTE Pharmacologic Prophylaxis: VTE Score: 5 High Risk (Score >/= 5) - Pharmacological DVT Prophylaxis Ordered: apixaban (Eliquis)  Sequential Compression Devices Ordered  Code Status: Level 1 - Full Code   Discussion with family: patient would update daughter  Anticipated Length of Stay: Patient will be admitted on an inpatient basis with an anticipated length of stay of greater than 2 midnights secondary to pendin workup, consultant recs  Chief Complaint: SOB, confusion, hyponatremia    History of Present Illness:  Katy Chavez is a 67 y o  female with a PMH of paroxysmal AFib on Eliquis, amiodarone, Toprol, CHF, CKD stage III, meningioma, fibromyalgia who presents with complaints of progressively worsening SOB, confusion, abnormal outpatient lab work  Of note, patient was scheduled for an ablation for AFib with [address] team tomorrow  Outpatient lab work showed sodium of 125; patient presented to the ED after being informed by PCP of abnormal lab work    She complains of progressively worsening SOB (conversational dyspnea) since past few weeks  She also tells me that her daughter noted worsening confusion (increasing forgetfulness) over the past few weeks  She also complains of imbalance for the past few months with occasional falls, no head strike so far  Of note, a meningioma was discovered on neuroimaging and patient tells me she follows outpatient with Neurosurgery  Intervention was not recommended at her last visit; she was told to get the procedure for heart ablation done  Patient reports no recent infections, worsening acid reflux, worsening catheterization tenderness or long/unusual trips  She did test positive for COVID few months ago, but has since been vaccinated x2  In the ED, sodium was found to be 125, which improved to 128 after 1 dose of Lasix  BNP was found to be elevated, chest x-ray showed signs of vascular congestion  Patient also had hypomagnesemia (repleted), elevated T bili  She will be admitted to [address] [place] [address] service for further workup, management  Cardiology consult placed  Review of Systems:  Review of Systems   Constitutional: Positive for activity change  Negative for fatigue and fever  HENT: Negative for congestion, sinus pain, sore throat, trouble swallowing and voice change  Eyes: Negative for visual disturbance  Respiratory: Positive for shortness of breath  Cardiovascular: Positive for leg swelling  Negative for chest pain and palpitations  Gastrointestinal: Negative for abdominal distention and abdominal pain  Musculoskeletal: Positive for gait problem  Skin: Negative for rash  Neurological: Negative for facial asymmetry, speech difficulty and headaches  Psychiatric/Behavioral: Positive for confusion  Negative for agitation and behavioral problems        Past Medical and Surgical History:   Past Medical History:   Diagnosis Date    Actinic keratosis     last assessed - [id number]    Arthritis     [facility] with [facility] )     last assessed - [id number]    Basal cell carcinoma     Benign colon polyp     last assessed - [id number]    Disease of thyroid gland     Effusion of knee joint right     Resolved - [id number]    Esophageal reflux     Fibromyalgia     last assessed - [id number]    Fibromyalgia, primary     GERD (gastroesophageal reflux disease)     Hypertension     Palpitations     last assessed - [id number]    Peroneal tendonitis, right     last assessed - [id number]    Right lumbar radiculopathy 3/17/2016    Thyroid cancer ([facility] )     Thyroid nodule     Trochanteric bursitis of left hip 3/9/2018       Past Surgical History:   Procedure Laterality Date    CATARACT EXTRACTION Bilateral     COLONOSCOPY  03/2018    EYE SURGERY      HYSTERECTOMY      JOINT REPLACEMENT Left     knee    W[place] REVISE MEDIAN N/CARPAL TUNNEL SURG Right 11/14/2019    Procedure: RELEASE CARPAL TUNNEL;  Surgeon: Army Radha MD;  [facility]: [facility];  Service: Orthopedics    W[place] THYROID LOBECTOMY,UNILAT Left 12/16/2020    Procedure: Left THYROID LOBECTOMY;  Surgeon: Oliverio Samuel MD;  Location[facility];  Service: ENT    RECTAL POLYPECTOMY      REPLACEMENT TOTAL KNEE Left     last assessed - 27Apr2015    TONSILLECTOMY      TOTAL ABDOMINAL HYSTERECTOMY      TUBAL LIGATION      US GUIDED THYROID BIOPSY  10/14/2020       Meds/Allergies:  Prior to Admission medications    Medication Sig Start Date End Date Taking?  Authorizing Provider   acetaminophen (TYLENOL) 650 mg CR tablet Take 1 tablet (650 mg total) by mouth every 6 (six) hours as needed for mild pain or moderate pain  Patient taking differently: Take 300 mg by mouth 2 (two) times a day 12/16/20   Oliverio Samuel MD   amiodarone 200 mg tablet Take 1 tablet (200 mg total) by mouth daily 2/23/22   Bernie Gutierrez MD   apixaban (ELIQUIS) 5 mg Take 1 tablet (5 mg total) by mouth 2 (two) times a day 6/13/22   Ayo Harrell DO   ascorbic acid (VITAMIN C) 500 mg tablet Take 500 mg by mouth daily     Historical Provider, MD   Cholecalciferol 50 MCG (2000 UT) CAPS Take 2,000 Units by mouth 2 (two) times a day      Historical Provider, MD   Chromium Picolinate (CHROMIUM PICOLATE PO) Take 1 tablet by mouth daily    Historical Provider, MD   DULoxetine (CYMBALTA) 30 mg delayed release capsule Take 1 capsule (30 mg total) by mouth daily after breakfast 5/16/22   Gael Muniz MD   ezetimibe (ZETIA) 10 mg tablet Take 1 tablet (10 mg total) by mouth daily 6/10/22   Gael Muniz MD   famotidine (PEPCID) 20 mg tablet Take 20 mg by mouth every other day      Historical Provider, MD   gabapentin (NEURONTIN) 300 mg capsule TAKE 2 CAPSULES BY MOUTH AT BEDTIME 3/25/21   JENNY Brice   ipratropium (ATROVENT) 0 03 % nasal spray 2 sprays into each nostril 3 (three) times a day Begin once per day, then progress to twice per day  Progress to three times a day as tolerated  2/15/22   Derrick Arana PA-C   lidocaine (XYLOCAINE) 5 % ointment Apply topically as needed for mild pain 1/3/22   JENNY Zhao   losartan (COZAAR) 25 mg tablet Take one 25 mg  tablet in addition to a 50 mg tablet twice a day 5/31/22   Cralos Torres DO   losartan (COZAAR) 50 mg tablet Take one  50 mg tablet in addition to a 25 mg tablet twice a day 5/31/22   Nir Cunningham DO   metoprolol succinate (TOPROL-XL) 100 mg 24 hr tablet Take 1 tablet (100 mg total) by mouth in the morning and 1 tablet (100 mg total) in the evening   5/16/22   Peter Martinez PA-C   rOPINIRole (REQUIP) 0 5 mg tablet Take 1 tablet (0 5 mg total) by mouth daily at bedtime 5/25/22   Gael Muniz MD   traMADol [place]) 50 mg tablet Take 1 tablet (50 mg total) by mouth 2 (two) times a day as needed for moderate pain 6/10/22   Gael Muniz MD   TURMERIC PO Take 1 capsule by mouth 2 (two) times a day    Historical Provider, MD   zolpidem (AMBIEN) 10 mg tablet Take 1 tablet (10 mg total) by mouth daily at bedtime as needed for sleep 5/5/22   Sharri Chavez MD   apixaban Chintan Lamas) 5 mg Take 1 tablet (5 mg total) by mouth 2 (two) times a day 4/20/22 6/13/22  Kylie Márquez DO     I have reviewed home medications using recent Epic encounter  Allergies: Allergies   Allergen Reactions    Sotalol Other (See Comments)     Prolonged QTc leading to torsades de pointes     Penicillins Other (See Comments)     As a child calcium deposit in the arm     Ace Inhibitors GI Intolerance     Did feel well on it       Social History:  Marital Status:     Occupation: retired   Patient Pre-hospital Living Situation: Home  Patient Pre-hospital Level of Mobility: walks  Patient Pre-hospital Diet Restrictions: unknown  Substance Use History:   Social History     Substance and Sexual Activity   Alcohol Use Not Currently    Comment: occosional - every 3 months     Social History     Tobacco Use   Smoking Status Never Smoker   Smokeless Tobacco Never Used     Social History     Substance and Sexual Activity   Drug Use Never       Family History:  Family History   Problem Relation Age of Onset    Heart disease Mother     Diabetes Mother     Heart disease Father     Coronary artery disease Father     Stroke Father         cerebrovascular accident    Heart attack Father         myocardial infarction    Sudden death Father         scd    Other Family         Back disorder    Coronary artery disease Family     Neuropathy Family     Osteoporosis Family     No Known Problems Daughter     No Known Problems Maternal Grandmother     No Known Problems Maternal Grandfather     No Known Problems Paternal Grandmother     No Known Problems Paternal Grandfather     Cancer Maternal Uncle     Breast cancer Maternal Aunt 72    No Known Problems Son     No Known Problems Maternal Aunt     No Known Problems Maternal Aunt     No Known Problems Maternal Aunt     No Known Problems Paternal Aunt     No Known Problems Paternal Aunt     Anuerysm Neg Hx     Clotting disorder Neg Hx     Arrhythmia Neg Hx     Heart failure Neg Hx        Physical Exam:     Vitals:   Blood Pressure: (!) 170/101 (06/13/22 2005)  Pulse: 102 (06/13/22 2005)  Temperature: 97 8 °F (36 6 °C) (06/13/22 1617)  Temp Source: Oral (06/13/22 1617)  Respirations: 18 (06/13/22 2005)  SpO2: 97 % (06/13/22 2005)    Physical Exam   Vitals reviewed  General Examination:  Sitting in bed, cooperative   HEENT: Normocephalic, Atraumatic  Extraocular movements intact, PERRLA  CVS: S1, S2 noted  Irregularly irregular rhythm  No JVD  Lungs: CTA b/l  Abdomen: Soft, normal bowel sounds  Non distended, non tender  Ext: +1 pitting edema in lower extremities, L>R  Psych: Though Process - logical    Skin: No bleeding/bruising noted  Neuro: A, Ox4  Follows simple, 3 steps commands  Appropriate antigravity strength in all 4 extremities proximally, distally  Gait: grossly normal- walks in short cautious steps  Additional Data:     Lab Results:  Results from last 7 days   Lab Units 06/13/22  1623   WBC Thousand/uL 6 73   HEMOGLOBIN g/dL 12 1   HEMATOCRIT % 36 4   PLATELETS Thousands/uL 275   NEUTROS PCT % 64   LYMPHS PCT % 21   MONOS PCT % 11   EOS PCT % 3     Results from last 7 days   Lab Units 06/13/22  1623   SODIUM mmol/L 128*   POTASSIUM mmol/L 4 1   CHLORIDE mmol/L 93*   CO2 mmol/L 25   BUN mg/dL 23   CREATININE mg/dL 1 34*   ANION GAP mmol/L 10   CALCIUM mg/dL 9 1   ALBUMIN g/dL 3 8   TOTAL BILIRUBIN mg/dL 1 66*   ALK PHOS U/L 68   ALT U/L 50   AST U/L 38   GLUCOSE RANDOM mg/dL 129                       Imaging: Personally reviewed the following imaging: chest xray  XR chest 1 view portable    (Results Pending)   CXR: CP angle blunting, pleural effusions on my read    EKG and Other Studies Reviewed on Admission:   · EKG: Atrial fibrillation  HR 80s  ** Please Note: This note has been constructed using a voice recognition system   **

## 2022-06-13 NOTE — ED PROVIDER NOTES
History  Chief Complaint   Patient presents with    Abnormal Lab     As per pts daughter pt has hx afib, symptoms of confusion, SOB, fatigue, reports low sodium on bloodwork today       History provided by:  Patient   used: No       58-year-old female, history of atrial fibrillation, scheduled for ablation at [address] presents emergency department for evaluation of fatigue, shortness of breath, confusion, hyponatremia on outpatient lab work  Is on amiodarone  Reports recent echocardiogram which showed ejection fraction in the 40% range  Describes shortness of breath, dyspnea on minimal exertion  Presents to the emergency department for admission for hyponatremia  Prior to Admission Medications   Prescriptions Last Dose Informant Patient Reported? Taking?    Cholecalciferol 50 MCG (2000 UT) CAPS  Self Yes No   Sig: Take 2,000 Units by mouth 2 (two) times a day     Chromium Picolinate (CHROMIUM PICOLATE PO)  Self Yes No   Sig: Take 1 tablet by mouth daily   DULoxetine (CYMBALTA) 30 mg delayed release capsule  Self No No   Sig: Take 1 capsule (30 mg total) by mouth daily after breakfast   TURMERIC PO  Self Yes No   Sig: Take 1 capsule by mouth 2 (two) times a day   acetaminophen (TYLENOL) 650 mg CR tablet  Self No No   Sig: Take 1 tablet (650 mg total) by mouth every 6 (six) hours as needed for mild pain or moderate pain   Patient taking differently: Take 300 mg by mouth 2 (two) times a day   amiodarone 200 mg tablet  Self No No   Sig: Take 1 tablet (200 mg total) by mouth daily   apixaban (ELIQUIS) 5 mg   No No   Sig: Take 1 tablet (5 mg total) by mouth 2 (two) times a day   ascorbic acid (VITAMIN C) 500 mg tablet  Self Yes No   Sig: Take 500 mg by mouth daily    ezetimibe (ZETIA) 10 mg tablet   No No   Sig: Take 1 tablet (10 mg total) by mouth daily   famotidine (PEPCID) 20 mg tablet  Self Yes No   Sig: Take 20 mg by mouth every other day     gabapentin (NEURONTIN) 300 mg capsule  Self No No   Sig: TAKE 2 CAPSULES BY MOUTH AT BEDTIME   ipratropium (ATROVENT) 0 03 % nasal spray  Self No No   Si sprays into each nostril 3 (three) times a day Begin once per day, then progress to twice per day  Progress to three times a day as tolerated  lidocaine (XYLOCAINE) 5 % ointment  Self No No   Sig: Apply topically as needed for mild pain   losartan (COZAAR) 25 mg tablet  Self No No   Sig: Take one 25 mg  tablet in addition to a 50 mg tablet twice a day   losartan (COZAAR) 50 mg tablet  Self No No   Sig: Take one  50 mg tablet in addition to a 25 mg tablet twice a day   metoprolol succinate (TOPROL-XL) 100 mg 24 hr tablet  Self No No   Sig: Take 1 tablet (100 mg total) by mouth in the morning and 1 tablet (100 mg total) in the evening     rOPINIRole (REQUIP) 0 5 mg tablet  Self No No   Sig: Take 1 tablet (0 5 mg total) by mouth daily at bedtime   traMADol (ULTRAM) 50 mg tablet   No No   Sig: Take 1 tablet (50 mg total) by mouth 2 (two) times a day as needed for moderate pain   zolpidem (AMBIEN) 10 mg tablet  Self No No   Sig: Take 1 tablet (10 mg total) by mouth daily at bedtime as needed for sleep      Facility-Administered Medications: None       Past Medical History:   Diagnosis Date    Actinic keratosis     last assessed - [id number]    Arthritis     Atrial fibrillation with rapid ventricular response (HCC)     last assessed - [id number]    Basal cell carcinoma     Benign colon polyp     last assessed - [id number]    Disease of thyroid gland     Effusion of knee joint right     Resolved - [id number]    Esophageal reflux     Fibromyalgia     last assessed - [id number]    Fibromyalgia, primary     GERD (gastroesophageal reflux disease)     Hypertension     Palpitations     last assessed - [id number]    Peroneal tendonitis, right     last assessed - [id number]    Right lumbar radiculopathy 3/17/2016    Thyroid cancer (HCC)     Thyroid nodule     Trochanteric bursitis of left hip 3/9/2018       Past Surgical History:   Procedure Laterality Date    CATARACT EXTRACTION Bilateral     COLONOSCOPY  03/2018    EYE SURGERY      HYSTERECTOMY      JOINT REPLACEMENT Left     knee    MO REVISE MEDIAN N/CARPAL TUNNEL SURG Right 11/14/2019    Procedure: RELEASE CARPAL TUNNEL;  Surgeon: Efren Hirsch MD;  Location: [facility];  Service: Orthopedics    MO THYROID LOBECTOMY,UNILAT Left 12/16/2020    Procedure: Left THYROID LOBECTOMY;  Surgeon: Bushra Soler MD;  Location[facility];  Service: ENT    RECTAL POLYPECTOMY      REPLACEMENT TOTAL KNEE Left     last assessed - [id number]    TONSILLECTOMY      TOTAL ABDOMINAL HYSTERECTOMY      TUBAL LIGATION      US GUIDED THYROID BIOPSY  10/14/2020       Family History   Problem Relation Age of Onset    Heart disease Mother     Diabetes Mother     Heart disease Father     Coronary artery disease Father     Stroke Father         cerebrovascular accident    Heart attack Father         myocardial infarction    Sudden death Father         scd    Other Family         Back disorder    Coronary artery disease Family     Neuropathy Family     Osteoporosis Family     No Known Problems Daughter     No Known Problems Maternal Grandmother     No Known Problems Maternal Grandfather     No Known Problems Paternal Grandmother     No Known Problems Paternal Grandfather     Cancer Maternal Uncle     Breast cancer Maternal Aunt 72    No Known Problems Son     No Known Problems Maternal Aunt     No Known Problems Maternal Aunt     No Known Problems Maternal Aunt     No Known Problems Paternal Aunt     No Known Problems Paternal Aunt     Anuerysm Neg Hx     Clotting disorder Neg Hx     Arrhythmia Neg Hx     Heart failure Neg Hx      I have reviewed and agree with the history as documented      E-Cigarette/Vaping    E-Cigarette Use Never User      E-Cigarette/Vaping Substances    Nicotine No     THC No     CBD No     Flavoring No  Other No     Unknown No      Social History     Tobacco Use    Smoking status: Never Smoker    Smokeless tobacco: Never Used   Vaping Use    Vaping Use: Never used   Substance Use Topics    Alcohol use: Not Currently     Comment: occosional - every 3 months    Drug use: Never       Review of Systems   Constitutional: Negative for activity change, appetite change, chills, diaphoresis, fatigue and fever  HENT: Negative for congestion, dental problem, ear discharge, facial swelling, nosebleeds, rhinorrhea, sinus pressure and trouble swallowing  Eyes: Negative for photophobia, discharge, itching and visual disturbance  Respiratory: Positive for shortness of breath  Negative for choking and chest tightness  Cardiovascular: Positive for palpitations  Negative for chest pain and leg swelling  Gastrointestinal: Negative for abdominal distention, abdominal pain, constipation, diarrhea, nausea and vomiting  Endocrine: Negative for polydipsia and polyphagia  Genitourinary: Negative for decreased urine volume, difficulty urinating, dysuria, flank pain, frequency and hematuria  Musculoskeletal: Negative for back pain, gait problem, joint swelling, neck pain and neck stiffness  Skin: Negative for color change and rash  Neurological: Negative for dizziness, facial asymmetry, speech difficulty, weakness and light-headedness  Psychiatric/Behavioral: Negative for agitation and behavioral problems  The patient is not nervous/anxious and is not hyperactive  All other systems reviewed and are negative  Physical Exam  Physical Exam  Vitals and nursing note reviewed  Constitutional:       General: She is not in acute distress  Appearance: She is well-developed  HENT:      Head: Normocephalic and atraumatic  Eyes:      Pupils: Pupils are equal, round, and reactive to light  Cardiovascular:      Rate and Rhythm: Normal rate  Rhythm irregular  Heart sounds: Normal heart sounds   No murmur heard  Pulmonary:      Effort: Pulmonary effort is normal  No respiratory distress  Breath sounds: Rales present  No wheezing  Abdominal:      General: Bowel sounds are normal  There is no distension  Palpations: Abdomen is soft  Tenderness: There is no abdominal tenderness  There is no guarding or rebound  Musculoskeletal:         General: No deformity  Normal range of motion  Cervical back: Normal range of motion and neck supple  Right lower leg: Edema present  Left lower leg: Edema present  Lymphadenopathy:      Cervical: No cervical adenopathy  Skin:     Capillary Refill: Capillary refill takes less than 2 seconds  Findings: No erythema or rash  Neurological:      Mental Status: She is alert and oriented to person, place, and time  Cranial Nerves: No cranial nerve deficit  Motor: No abnormal muscle tone  Coordination: Coordination normal    Psychiatric:         Behavior: Behavior normal          Vital Signs  ED Triage Vitals [06/13/22 1617]   Temperature Pulse Respirations Blood Pressure SpO2   97 8 °F (36 6 °C) 87 18 145/97 97 %      Temp Source Heart Rate Source Patient Position - Orthostatic VS BP Location FiO2 (%)   Oral Monitor Sitting Left arm --      Pain Score       --           Vitals:    06/13/22 1617 06/13/22 1813   BP: 145/97 166/86   Pulse: 87 94   Patient Position - Orthostatic VS: Sitting Lying         Visual Acuity      ED Medications  Medications   furosemide (LASIX) injection 20 mg (has no administration in time range)       Diagnostic Studies  Results Reviewed     Procedure Component Value Units Date/Time    HS Troponin I 2hr [[id number]] Collected: 06/13/22 1830    Lab Status: No result Specimen: Blood from Arm, Left     HS Troponin I 4hr [[id number]]     Lab Status: No result Specimen: Blood     B-Type Natriuretic Peptide(BNP), [facility], [facility], [facility] Only [[id number]] Collected: 06/13/22 1623    Lab Status:  In process Specimen: Blood from Arm, Left Updated: 06/13/22 1813    TSH, 3rd generation with Free T4 reflex [[id number]] Collected: 06/13/22 1623    Lab Status: In process Specimen: Blood from Arm, Left Updated: 06/13/22 1807    Magnesium [[id number]] Collected: 06/13/22 1623    Lab Status: In process Specimen: Blood from Arm, Left Updated: 06/13/22 1803    HS Troponin 0hr (reflex protocol) [[id number]]  (Normal) Collected: 06/13/22 1623    Lab Status: Final result Specimen: Blood from Arm, Left Updated: 06/13/22 1704     hs TnI 0hr 7 ng/L     CBC and differential [[id number]] Collected: 06/13/22 1623    Lab Status: Final result Specimen: Blood from Arm, Left Updated: 06/13/22 1638     WBC 6 73 Thousand/uL      RBC 3 83 Million/uL      Hemoglobin 12 1 g/dL      Hematocrit 36 4 %      MCV 95 fL      MCH 31 6 pg      MCHC 33 2 g/dL      RDW 13 3 %      MPV 10 0 fL      Platelets 616 Thousands/uL      nRBC 0 /100 WBCs      Neutrophils Relative 64 %      Immat GRANS % 0 %      Lymphocytes Relative 21 %      Monocytes Relative 11 %      Eosinophils Relative 3 %      Basophils Relative 1 %      Neutrophils Absolute 4 30 Thousands/µL      Immature Grans Absolute 0 03 Thousand/uL      Lymphocytes Absolute 1 38 Thousands/µL      Monocytes Absolute 0 77 Thousand/µL      Eosinophils Absolute 0 19 Thousand/µL      Basophils Absolute 0 06 Thousands/µL     Comprehensive metabolic panel [[id number]] Collected: 06/13/22 1623    Lab Status:  In process Specimen: Blood from Arm, Left Updated: 06/13/22 1630                 XR chest 1 view portable    (Results Pending)              Procedures  ECG 12 Lead Documentation Only    Date/Time: 6/13/2022 4:25 PM  Performed by: Ammy New MD  Authorized by: Ammy New MD     Indications / Diagnosis:  Shortness of breath  ECG reviewed by me, the ED Provider: yes    Patient location:  ED  Interpretation:     Interpretation: abnormal    Rate:     ECG rate:  85    ECG rate assessment: normal    Rhythm: Rhythm: atrial fibrillation    Ectopy:     Ectopy: none    QRS:     QRS axis:  Normal    QRS intervals:  Normal  Conduction:     Conduction: normal    ST segments:     ST segments:  Normal  T waves:     T waves: normal               ED Course             HEART Risk Score    Flowsheet Row Most Recent Value   Heart Score Risk Calculator    History 0 Filed at: 06/13/2022 1844   ECG 1 Filed at: 06/13/2022 1844   Age 2 Filed at: 06/13/2022 1844   Risk Factors 1 Filed at: 06/13/2022 1844   Troponin 0 Filed at: 06/13/2022 1844   HEART Score 4 Filed at: 06/13/2022 1844                        SBIRT 20yo+    Flowsheet Row Most Recent Value   SBIRT (25 yo +)    In order to provide better care to our patients, we are screening all of our patients for alcohol and drug use  Would it be okay to ask you these screening questions? Unable to answer at this time Filed at: 06/13/2022 1618                    MDM  Number of Diagnoses or Management Options  Hyponatremia: new and requires workup  Pleural effusion: new and requires workup  Shortness of breath: new and requires workup  Diagnosis management comments: Hyponatremia, symptomatic, no seizure activity, shortness of breath on exertion, x-ray concerning for volume overload, gentle diuresis given to patient  She has no chest pain  EKG atrial fibrillation  Will need admission to hospitalist service for further correction of hyponatremia, diuresis         Amount and/or Complexity of Data Reviewed  Clinical lab tests: ordered and reviewed  Tests in the radiology section of CPT®: ordered and reviewed  Tests in the medicine section of CPT®: ordered and reviewed  Obtain history from someone other than the patient: yes  Discuss the patient with other providers: yes  Independent visualization of images, tracings, or specimens: yes    Risk of Complications, Morbidity, and/or Mortality  Presenting problems: high  Diagnostic procedures: high  Management options: high    Patient Progress  Patient progress: stable      Disposition  Final diagnoses:   Hyponatremia   Shortness of breath   Pleural effusion     Time reflects when diagnosis was documented in both MDM as applicable and the Disposition within this note     Time User Action Codes Description Comment    6/13/2022  6:39 PM Clement Hong [E87 1] Hyponatremia     6/13/2022  6:39 PM Clement Hong [R06 02] Shortness of breath     6/13/2022  6:39 PM Clement Hong [J90] Pleural effusion       ED Disposition     ED Disposition   Admit    Condition   Stable    Date/Time   Mon Jun 13, 2022  6:39 PM    Comment   Case was discussed with Dr Thien Bellamy and the patient's admission status was agreed to be Admission Status: inpatient status to the service of Dr Thien Bellamy   Follow-up Information    None         Patient's Medications   Discharge Prescriptions    No medications on file       No discharge procedures on file      PDMP Review       Value Time User    PDMP Reviewed  Yes 9/3/2021 10:01 AM Damien Sim MD          ED Provider  Electronically Signed by           Bo Sarah MD  06/13/22 5904

## 2022-06-13 NOTE — ASSESSMENT & PLAN NOTE
· Home meds: Toprol 100 mg QD, Cozaar 75 mg QD    Plan:   · Hold Cozaar in setting of rising Cr  · Continue Toprol 100 mg QD  · Monitor vitals

## 2022-06-13 NOTE — ASSESSMENT & PLAN NOTE
· Was supposed to have an ablation at [place] [address] tomorrow   · EKG shows rate controlled Afib   · On Toprol 100 mg QD, Amio 200 mg QD, Eliquis 5 mg BID    Plan:   · Continue home meds  · Monitor on telemetry

## 2022-06-13 NOTE — TELEPHONE ENCOUNTER
I spoke with patient's daughter Seema Morel today re Farnaz's labs all results normal except for Na+ 125 and chloride 94  04/2022 Na+ 131  Mildly elevated AST and bilirubin Patient is scheduled for a cardiac ablation tomorrow at [facility]  Given patient's symptoms and decline in her condition-weakness, lethargy and confusion I recommended ER evaluation today and cancelling ablation procedure       Recent Results (from the past 168 hour(s))   Comprehensive metabolic panel    Collection Time: 06/13/22  8:13 AM   Result Value Ref Range    Sodium 125 (L) 136 - 145 mmol/L    Potassium 4 4 3 5 - 5 3 mmol/L    Chloride 94 (L) 100 - 108 mmol/L    CO2 27 21 - 32 mmol/L    ANION GAP 4 4 - 13 mmol/L    BUN 20 5 - 25 mg/dL    Creatinine 1 08 0 60 - 1 30 mg/dL    Glucose, Fasting 117 (H) 65 - 99 mg/dL    Calcium 9 5 8 3 - 10 1 mg/dL    AST 51 (H) 5 - 45 U/L    ALT 70 12 - 78 U/L    Alkaline Phosphatase 76 46 - 116 U/L    Total Protein 7 2 6 4 - 8 2 g/dL    Albumin 3 5 3 5 - 5 0 g/dL    Total Bilirubin 1 79 (H) 0 20 - 1 00 mg/dL    eGFR 49 ml/min/1 73sq m   CBC and differential    Collection Time: 06/13/22  8:13 AM   Result Value Ref Range    WBC 6 95 4 31 - 10 16 Thousand/uL    RBC 4 04 3 81 - 5 12 Million/uL    Hemoglobin 12 7 11 5 - 15 4 g/dL    Hematocrit 38 9 34 8 - 46 1 %    MCV 96 82 - 98 fL    MCH 31 4 26 8 - 34 3 pg    MCHC 32 6 31 4 - 37 4 g/dL    RDW 13 4 11 6 - 15 1 %    MPV 10 5 8 9 - 12 7 fL    Platelets 838 446 - 472 Thousands/uL    nRBC 0 /100 WBCs    Neutrophils Relative 70 43 - 75 %    Immat GRANS % 0 0 - 2 %    Lymphocytes Relative 16 14 - 44 %    Monocytes Relative 10 4 - 12 %    Eosinophils Relative 3 0 - 6 %    Basophils Relative 1 0 - 1 %    Neutrophils Absolute 4 88 1 85 - 7 62 Thousands/µL    Immature Grans Absolute 0 02 0 00 - 0 20 Thousand/uL    Lymphocytes Absolute 1 13 0 60 - 4 47 Thousands/µL    Monocytes Absolute 0 66 0 17 - 1 22 Thousand/µL    Eosinophils Absolute 0 19 0 00 - 0 61 Thousand/µL    Basophils Absolute 0 07 0 00 - 0 10 Thousands/µL   TSH, 3rd generation with Free T4 reflex    Collection Time: 06/13/22  8:13 AM   Result Value Ref Range    TSH 3RD GENERATON 1 640 0 450 - 4 500 uIU/mL

## 2022-06-13 NOTE — ASSESSMENT & PLAN NOTE
· POA Na: 125--> repeat 128 in setting of signs of volume overload w/ BNP of 928; reported confusion (as noticed by daughter), imbalance (since past few months) at home  · S/p 1 dose IV Lasix 20 mg in ED    Plan:   · Encourage PO intake  · BMP q6h- Na correction rate: 6-12 mEq w/in 24 hrs  · Hold losartan  · Monitor Cr

## 2022-06-13 NOTE — ASSESSMENT & PLAN NOTE
· MRI brain in 4/22 demonstrated meningioma  · Follows w/ Neurosurgery outpatient  · Unlikely to be causing syx of imbalance, confusion  · Non focal neuro exam- hold off on [address] for now  · Obtain CTH if GCS drops more than 2 pts in an hour or if pt has new deficits overnight

## 2022-06-13 NOTE — TELEPHONE ENCOUNTER
Call re labs sodium low at 125 decreased from 131 in April  I would consider admission given her overall condition at her last visit with generalized weakness, confusion       Recent Results (from the past 168 hour(s))   Comprehensive metabolic panel    Collection Time: 06/13/22  8:13 AM   Result Value Ref Range    Sodium 125 (L) 136 - 145 mmol/L    Potassium 4 4 3 5 - 5 3 mmol/L    Chloride 94 (L) 100 - 108 mmol/L    CO2 27 21 - 32 mmol/L    ANION GAP 4 4 - 13 mmol/L    BUN 20 5 - 25 mg/dL    Creatinine 1 08 0 60 - 1 30 mg/dL    Glucose, Fasting 117 (H) 65 - 99 mg/dL    Calcium 9 5 8 3 - 10 1 mg/dL    AST 51 (H) 5 - 45 U/L    ALT 70 12 - 78 U/L    Alkaline Phosphatase 76 46 - 116 U/L    Total Protein 7 2 6 4 - 8 2 g/dL    Albumin 3 5 3 5 - 5 0 g/dL    Total Bilirubin 1 79 (H) 0 20 - 1 00 mg/dL    eGFR 49 ml/min/1 73sq m   CBC and differential    Collection Time: 06/13/22  8:13 AM   Result Value Ref Range    WBC 6 95 4 31 - 10 16 Thousand/uL    RBC 4 04 3 81 - 5 12 Million/uL    Hemoglobin 12 7 11 5 - 15 4 g/dL    Hematocrit 38 9 34 8 - 46 1 %    MCV 96 82 - 98 fL    MCH 31 4 26 8 - 34 3 pg    MCHC 32 6 31 4 - 37 4 g/dL    RDW 13 4 11 6 - 15 1 %    MPV 10 5 8 9 - 12 7 fL    Platelets 965 672 - 770 Thousands/uL    nRBC 0 /100 WBCs    Neutrophils Relative 70 43 - 75 %    Immat GRANS % 0 0 - 2 %    Lymphocytes Relative 16 14 - 44 %    Monocytes Relative 10 4 - 12 %    Eosinophils Relative 3 0 - 6 %    Basophils Relative 1 0 - 1 %    Neutrophils Absolute 4 88 1 85 - 7 62 Thousands/µL    Immature Grans Absolute 0 02 0 00 - 0 20 Thousand/uL    Lymphocytes Absolute 1 13 0 60 - 4 47 Thousands/µL    Monocytes Absolute 0 66 0 17 - 1 22 Thousand/µL    Eosinophils Absolute 0 19 0 00 - 0 61 Thousand/µL    Basophils Absolute 0 07 0 00 - 0 10 Thousands/µL   TSH, 3rd generation with Free T4 reflex    Collection Time: 06/13/22  8:13 AM   Result Value Ref Range    TSH 3RD GENERATON 1 640 0 450 - 4 500 uIU/mL

## 2022-06-14 PROBLEM — R41.0 CONFUSION: Status: ACTIVE | Noted: 2022-06-14

## 2022-06-14 LAB
ANION GAP SERPL CALCULATED.3IONS-SCNC: 10 MMOL/L (ref 4–13)
ANION GAP SERPL CALCULATED.3IONS-SCNC: 11 MMOL/L (ref 4–13)
ANION GAP SERPL CALCULATED.3IONS-SCNC: 13 MMOL/L (ref 4–13)
ANION GAP SERPL CALCULATED.3IONS-SCNC: 8 MMOL/L (ref 4–13)
ATRIAL RATE: 89 BPM
BUN SERPL-MCNC: 20 MG/DL (ref 5–25)
BUN SERPL-MCNC: 24 MG/DL (ref 5–25)
CALCIUM SERPL-MCNC: 9.1 MG/DL (ref 8.4–10.2)
CALCIUM SERPL-MCNC: 9.2 MG/DL (ref 8.4–10.2)
CALCIUM SERPL-MCNC: 9.3 MG/DL (ref 8.4–10.2)
CALCIUM SERPL-MCNC: 9.3 MG/DL (ref 8.4–10.2)
CHLORIDE SERPL-SCNC: 90 MMOL/L (ref 96–108)
CHLORIDE SERPL-SCNC: 91 MMOL/L (ref 96–108)
CHLORIDE SERPL-SCNC: 91 MMOL/L (ref 96–108)
CHLORIDE SERPL-SCNC: 92 MMOL/L (ref 96–108)
CO2 SERPL-SCNC: 27 MMOL/L (ref 21–32)
CO2 SERPL-SCNC: 27 MMOL/L (ref 21–32)
CO2 SERPL-SCNC: 28 MMOL/L (ref 21–32)
CO2 SERPL-SCNC: 28 MMOL/L (ref 21–32)
CREAT SERPL-MCNC: 1.02 MG/DL (ref 0.6–1.3)
CREAT SERPL-MCNC: 1.1 MG/DL (ref 0.6–1.3)
CREAT SERPL-MCNC: 1.14 MG/DL (ref 0.6–1.3)
CREAT SERPL-MCNC: 1.19 MG/DL (ref 0.6–1.3)
GFR SERPL CREATININE-BSD FRML MDRD: 44 ML/MIN/1.73SQ M
GFR SERPL CREATININE-BSD FRML MDRD: 46 ML/MIN/1.73SQ M
GFR SERPL CREATININE-BSD FRML MDRD: 48 ML/MIN/1.73SQ M
GFR SERPL CREATININE-BSD FRML MDRD: 53 ML/MIN/1.73SQ M
GLUCOSE SERPL-MCNC: 121 MG/DL (ref 65–140)
GLUCOSE SERPL-MCNC: 122 MG/DL (ref 65–140)
GLUCOSE SERPL-MCNC: 134 MG/DL (ref 65–140)
GLUCOSE SERPL-MCNC: 149 MG/DL (ref 65–140)
POTASSIUM SERPL-SCNC: 3.4 MMOL/L (ref 3.5–5.3)
POTASSIUM SERPL-SCNC: 3.4 MMOL/L (ref 3.5–5.3)
POTASSIUM SERPL-SCNC: 3.5 MMOL/L (ref 3.5–5.3)
POTASSIUM SERPL-SCNC: 3.7 MMOL/L (ref 3.5–5.3)
PR INTERVAL: 0 MS
QRS AXIS: 133 DEGREES
QRSD INTERVAL: 106 MS
QT INTERVAL: 340 MS
QTC INTERVAL: 405 MS
SODIUM SERPL-SCNC: 128 MMOL/L (ref 135–147)
SODIUM SERPL-SCNC: 128 MMOL/L (ref 135–147)
SODIUM SERPL-SCNC: 129 MMOL/L (ref 135–147)
SODIUM SERPL-SCNC: 131 MMOL/L (ref 135–147)
T WAVE AXIS: -78 DEGREES
VENTRICULAR RATE: 85 BPM

## 2022-06-14 PROCEDURE — 93010 ELECTROCARDIOGRAM REPORT: CPT | Performed by: INTERNAL MEDICINE

## 2022-06-14 PROCEDURE — 99233 SBSQ HOSP IP/OBS HIGH 50: CPT | Performed by: INTERNAL MEDICINE

## 2022-06-14 PROCEDURE — 99223 1ST HOSP IP/OBS HIGH 75: CPT | Performed by: INTERNAL MEDICINE

## 2022-06-14 PROCEDURE — 80048 BASIC METABOLIC PNL TOTAL CA: CPT

## 2022-06-14 RX ORDER — LIDOCAINE 50 MG/G
1 PATCH TOPICAL DAILY
Status: DISCONTINUED | OUTPATIENT
Start: 2022-06-14 | End: 2022-06-17 | Stop reason: HOSPADM

## 2022-06-14 RX ORDER — POTASSIUM CHLORIDE 20 MEQ/1
20 TABLET, EXTENDED RELEASE ORAL 2 TIMES DAILY
Status: DISCONTINUED | OUTPATIENT
Start: 2022-06-14 | End: 2022-06-17 | Stop reason: HOSPADM

## 2022-06-14 RX ORDER — FUROSEMIDE 10 MG/ML
40 INJECTION INTRAMUSCULAR; INTRAVENOUS
Status: DISCONTINUED | OUTPATIENT
Start: 2022-06-14 | End: 2022-06-16

## 2022-06-14 RX ADMIN — LIDOCAINE 5% 1 PATCH: 700 PATCH TOPICAL at 17:02

## 2022-06-14 RX ADMIN — METOPROLOL SUCCINATE 125 MG: 100 TABLET, EXTENDED RELEASE ORAL at 21:43

## 2022-06-14 RX ADMIN — EZETIMIBE 10 MG: 10 TABLET ORAL at 19:48

## 2022-06-14 RX ADMIN — POTASSIUM CHLORIDE 20 MEQ: 1500 TABLET, EXTENDED RELEASE ORAL at 17:02

## 2022-06-14 RX ADMIN — METOPROLOL SUCCINATE 100 MG: 100 TABLET, EXTENDED RELEASE ORAL at 08:23

## 2022-06-14 RX ADMIN — APIXABAN 5 MG: 5 TABLET, FILM COATED ORAL at 09:57

## 2022-06-14 RX ADMIN — FUROSEMIDE 40 MG: 10 INJECTION, SOLUTION INTRAMUSCULAR; INTRAVENOUS at 08:25

## 2022-06-14 RX ADMIN — CHOLECALCIFEROL TAB 10 MCG (400 UNIT) 400 UNITS: 10 TAB at 08:22

## 2022-06-14 RX ADMIN — ZOLPIDEM TARTRATE 10 MG: 5 TABLET ORAL at 21:43

## 2022-06-14 RX ADMIN — APIXABAN 5 MG: 5 TABLET, FILM COATED ORAL at 19:48

## 2022-06-14 RX ADMIN — FUROSEMIDE 40 MG: 10 INJECTION, SOLUTION INTRAMUSCULAR; INTRAVENOUS at 17:02

## 2022-06-14 RX ADMIN — OXYCODONE HYDROCHLORIDE AND ACETAMINOPHEN 500 MG: 500 TABLET ORAL at 08:23

## 2022-06-14 NOTE — QUICK NOTE
I spoke to this patient's Kathleen Camp, over the phone to provide a comprehensive clinical update  I answered all of her questions and addressed all of her concerns to the best of my ability and to her satisfaction

## 2022-06-14 NOTE — PLAN OF CARE
Problem: CARDIOVASCULAR - ADULT  Goal: Absence of cardiac dysrhythmias or at baseline rhythm  Description: INTERVENTIONS:  - Continuous cardiac monitoring, vital signs, obtain 12 lead EKG if ordered  - Administer antiarrhythmic and heart rate control medications as ordered  - Monitor electrolytes and administer replacement therapy as ordered  Outcome: Progressing     Problem: METABOLIC, FLUID AND ELECTROLYTES - ADULT  Goal: Electrolytes maintained within normal limits  Description: INTERVENTIONS:  - Monitor labs and assess patient for signs and symptoms of electrolyte imbalances  - Administer electrolyte replacement as ordered  - Monitor response to electrolyte replacements, including repeat lab results as appropriate  - Instruct patient on fluid and nutrition as appropriate  Outcome: Progressing     Problem: DISCHARGE PLANNING  Goal: Discharge to home or other facility with appropriate resources  Description: INTERVENTIONS:  - Identify barriers to discharge w/patient and caregiver  - Arrange for needed discharge resources and transportation as appropriate  - Identify discharge learning needs (meds, wound care, etc )  - Arrange for interpretive services to assist at discharge as needed  - Refer to Case Management Department for coordinating discharge planning if the patient needs post-hospital services based on physician/advanced practitioner order or complex needs related to functional status, cognitive ability, or social support system  Outcome: Progressing     Problem: Knowledge Deficit  Goal: Patient/family/caregiver demonstrates understanding of disease process, treatment plan, medications, and discharge instructions  Description: Complete learning assessment and assess knowledge base    Interventions:  - Provide teaching at level of understanding  - Provide teaching via preferred learning methods  Outcome: Progressing

## 2022-06-14 NOTE — ASSESSMENT & PLAN NOTE
Lab Results   Component Value Date    TBILI 1 66 (H) 06/13/2022    TBILI 1 79 (H) 06/13/2022    TBILI 0 79 04/01/2022     Elevated direct bilirubin noted (0 38)  Likely in the setting of acute heart failure exacerbation    · Benign abdominal exam  · Monitor via CMP

## 2022-06-14 NOTE — ASSESSMENT & PLAN NOTE
· Was supposed to have an ablation at [facility] on 06/14  · EKG shows rate controlled Afib     Plan:   · Toprol increased to 125 mg b i d , amiodarone 200 mg daily, Eliquis 5 mg b i d   · Monitor on telemetry  · Will attempt to reschedule ablation as per Cardiology team's recommendations

## 2022-06-14 NOTE — CONSULTS
Cardiology   [facility] 67 y o  female MRN: [id number]  Unit/Bed#: S -78 Encounter: [id number]      Reason for Consult / Principal Problem:    Physician Requesting Consult:  Deborah Calero MD    Outpatient Cardiologist:  Dr Ronald Smith , Dr Jeff Eric  1  Acute on chronic HFmrEF - likely provoked in the setting of # 2  -On PE - evidence of volume overload; JVD, bibasilar fine crackles, abdominal bloating, and mild nonpitting bilateral lower extremity edema   -Subjectively - dyspneic with exertion and orthopnea  -NT proBNP level 928  -Chest x-ray - pulmonary vascular congestion, small bilateral pleural effusions  TTE 5/16/2022 - LVEF 45% (EF previously 55% (1/2021) , mild global hypokinesis, RV mildly dilated/systolic function mildly reduced, LE mildly dilated, RA mildly dilated, mild-to-moderate MR, moderate TR, severely elevated RV systolic pressure  -Previously on no oral diuretics as an outpatient  -Received 60 mg of IV Lasix yesterday, and 40 mg of IV Lasix this a m  -24 hour I&O balance; -20 ml ?  accuracy  2  Recurrent/symptomatic persistent atrial fibrillation with RVR  -Complex AFib history  -S/p prior AFib ablation with PVI performed w/ cryo balloon (2/2021) - developed symptomatic bradycardia and underwent subsequent MDT DC ppm implant (3/2021)  -Follows w/ Dr Ronald Smith with [facility] and now Ayush Sour Dr Bethany Quiñonez - tentative plan for repeat ablation procedure arranged as an outpatient at [facility]   -12 lead ECG 6/13 - atrial fibrillation, rate 85 bpm  -24 hour telemetry review; atrial fibrillation, rate suboptimally controlled in the 100-130 range   -On amiodarone 200 mg daily and metoprolol succinate 125 mg b i d   -On Eliquis 5 mg b i d  for systemic anticoagulation  3  MDT PPM in situ  -PPM check in the EP office 5/9/2022 -- showing persistent AF since March 2022 with AVG HR of 100 bpm but max HR's into the 150's daily  -Device interrogation 3/31/2022 - 100% AFib burden    Normal device function  4  Hyponatremia  NA +level 125 on admission, slowly improving currently 128    5  Dyslipidemia  -Lipid profile 4/2021 - cholesterol 176, triglycerides 112, HDL 66, and LDL 88    -On Zetia 10 mg daily  6  Hypertension  -BP last recorded at 158/88, HR 89    -Outpatient BP regimen; losartan 50 mg b i d  and metoprolol succinate 100 mg b i d   -Inpatient BP regimen; metoprolol succinate 125 mg b i d   7  Thyroid nodule s/p left thyroid lobectomy (12/16/2020)  -TSH 2 0    Plan  -Repeat limited TTE to reassess LV systolic function   -Eventual outpatient repeat ablation procedure at [facility]   -Agree with IV diuresis but will up titrate to 40 mg b i d add K-Dur 20 mEq b i d  -Up titrate Toprol XL back to 125 mg b i d  as previously taking as an outpatient  Consider adding digoxin if needed for improved rate control  Heart rates hopeful to improve as she gets closer to euvolemia   -Continue oral amiodarone 200 mg daily   -Agree with holding ARB for now given elevated creatinine on admission  -NA +level slowly improving  Recommend 2 gm Na+ diet, fluid restriction of 1 2 L    -Monitor renal function electrolytes closely, replete to maintain K +level 4 0, magnesium 2 0   -Continue monitor closely on telemetry  HPI: Garry Wiseman 67y o  year old female with a medical history of recurrence/symptomatic persistent atrial fibrillation, s/p prior AFib ablation/PVI procedure (2/2021) w/ subsequent development of bradycardia requiring a MDT DC ppm implant (3/2021), mildly reduced EF 45% per recent TTE imaging (previously 55% in 2021), dyslipidemia, essential hypertension, and thyroid nodule status post left thyroid lobectomy in 12/2020  Routinely follows with Dr Adan Irby with the  EP service as an outpatient as well as Dr Juancho Tavarez with [facility] EP service    She was recently evaluated by Dr Muhammad with [facility] EP via tele medicine visit earlier this month whom ultimately recommended a repeat ablation procedure with PVI in which the patient had tentatively planned for as an outpatient  She presented to the [facility] on 06/13/2022 with progressive symptoms of SOB at rest, JOHNSON, and increased forgetfulness/confusion over the past few weeks  Further workup in the ED  Hemodynamics on admission  -Temp 97 8° F, HR 87, R 18, /97, sat 97% on RA  Laboratory data on admission  -NA +125, K +4 4, chloride 94, CO2 27, anion gap 4, BUN 20, creatinine 1 08, glucose 117, calcium 9 5, AST 51, ALT 70, alk-phos 76, T bili 1 79, WBC 6 9, HGB 12 7, platelet count 156  -HS troponin levels; 6 -- 6 -- 7  -NT proBNP level 928  Imaging  -Chest x-ray - small bilateral pleural effusions with extensive bibasilar atelectasis and/or infiltrates, right greater than left  Given the presence of volume overload on exam/imaging she was initiated on a course of IV diuresis by the primary team     Cardiology was consulted for further treatment recommendations/management      Family History:   Family History   Problem Relation Age of Onset    Heart disease Mother     Diabetes Mother     Heart disease Father     Coronary artery disease Father     Stroke Father         cerebrovascular accident    Heart attack Father         myocardial infarction    Sudden death Father         scd    Other Family         Back disorder    Coronary artery disease Family     Neuropathy Family     Osteoporosis Family     No Known Problems Daughter     No Known Problems Maternal Grandmother     No Known Problems Maternal Grandfather     No Known Problems Paternal Grandmother     No Known Problems Paternal Grandfather     Cancer Maternal Uncle     Breast cancer Maternal Aunt 72    No Known Problems Son     No Known Problems Maternal Aunt     No Known Problems Maternal Aunt     No Known Problems Maternal Aunt     No Known Problems Paternal Aunt     No Known Problems Paternal Aunt     Anuerysm Neg Hx     Clotting disorder Neg Hx     Arrhythmia Neg Hx     Heart failure Neg Hx      Historical Information   Past Medical History:   Diagnosis Date    Actinic keratosis     last assessed - 06Jun2014    Arthritis     Atrial fibrillation with rapid ventricular response (HCC)     last assessed - 26Apr2016    Basal cell carcinoma     Benign colon polyp     last assessed - 27Apr2015    Disease of thyroid gland     Effusion of knee joint right     Resolved - 19Apr2016    Esophageal reflux     Fibromyalgia     last assessed - 27Dec2017    Fibromyalgia, primary     GERD (gastroesophageal reflux disease)     Hypertension     Palpitations     last assessed - 30Apr2013    Peroneal tendonitis, right     last assessed - 02Oct2013    Right lumbar radiculopathy 3/17/2016    Thyroid cancer ([address] )     Thyroid nodule     Trochanteric bursitis of left hip 3/9/2018     Past Surgical History:   Procedure Laterality Date    CATARACT EXTRACTION Bilateral     COLONOSCOPY  03/2018    EYE SURGERY      HYSTERECTOMY      JOINT REPLACEMENT Left     knee    CA REVISE MEDIAN N/CARPAL TUNNEL SURG Right 11/14/2019    Procedure: RELEASE CARPAL TUNNEL;  Surgeon: Julio César Plata MD;  [facility];  Service: Orthopedics    CA THYROID LOBECTOMY,UNILAT Left 12/16/2020    Procedure: Left THYROID LOBECTOMY;  Surgeon: Chantelle Hughes MD;  [facility];  Service: ENT    RECTAL POLYPECTOMY      REPLACEMENT TOTAL KNEE Left     last assessed - 27Apr2015    TONSILLECTOMY      TOTAL ABDOMINAL HYSTERECTOMY      TUBAL LIGATION      US GUIDED THYROID BIOPSY  10/14/2020     Social History   Social History     Substance and Sexual Activity   Alcohol Use Not Currently    Comment: occosional - every 3 months     Social History     Substance and Sexual Activity   Drug Use Never     Social History     Tobacco Use   Smoking Status Never Smoker   Smokeless Tobacco Never Used     Family History:   Family History   Problem Relation Age of Onset    Heart disease Mother     Diabetes Mother     Heart disease Father     Coronary artery disease Father     Stroke Father         cerebrovascular accident    Heart attack Father         myocardial infarction    Sudden death Father         scd    Other Family         Back disorder    Coronary artery disease Family     Neuropathy Family     Osteoporosis Family     No Known Problems Daughter     No Known Problems Maternal Grandmother     No Known Problems Maternal Grandfather     No Known Problems Paternal Grandmother     No Known Problems Paternal Grandfather     Cancer Maternal Uncle     Breast cancer Maternal Aunt 72    No Known Problems Son     No Known Problems Maternal Aunt     No Known Problems Maternal Aunt     No Known Problems Maternal Aunt     No Known Problems Paternal Aunt     No Known Problems Paternal Aunt     Anuerysm Neg Hx     Clotting disorder Neg Hx     Arrhythmia Neg Hx     Heart failure Neg Hx        Review of Systems:  Review of Systems   Constitutional: Positive for activity change, fatigue and unexpected weight change  Negative for chills and fever  Eyes: Negative for visual disturbance  Respiratory: Negative for cough, chest tightness and shortness of breath  Cardiovascular: Positive for palpitations and leg swelling  Negative for chest pain  +JOHNSON and orthopnea  Gastrointestinal: Positive for abdominal distention  Negative for abdominal pain, constipation, diarrhea, nausea and vomiting  Neurological: Positive for headaches  Negative for dizziness and light-headedness  All other systems reviewed and are negative            Scheduled Meds:  Current Facility-Administered Medications   Medication Dose Route Frequency Provider Last Rate    acetaminophen  650 mg Oral Q4H PRN Narda Ervin DO      amiodarone  200 mg Oral Daily Gloria Zuleta MD      apixaban  5 mg Oral BID Gloria Zuleta MD      ascorbic acid  500 mg Oral Daily Gloria Zuleta MD      cholecalciferol  400 Units Oral Daily Jdaa Pereira MD      ezetimibe  10 mg Oral Daily Jada Pereira MD      famotidine  20 mg Oral Every Other Day Jada Pereira MD      fluticasone  2 spray Each Nare Daily Jada Pereira MD      metoprolol succinate  100 mg Oral Q12H [facility] Jada Pereira MD      zolpidem  10 mg Oral HS PRN Hosea Layman, DO       Continuous Infusions:   PRN Meds:   acetaminophen    zolpidem  all current active meds have been reviewed and current meds:   Current Facility-Administered Medications   Medication Dose Route Frequency    acetaminophen (TYLENOL) tablet 650 mg  650 mg Oral Q4H PRN    amiodarone tablet 200 mg  200 mg Oral Daily    apixaban (ELIQUIS) tablet 5 mg  5 mg Oral BID    ascorbic acid (VITAMIN C) tablet 500 mg  500 mg Oral Daily    cholecalciferol (VITAMIN D3) tablet 400 Units  400 Units Oral Daily    ezetimibe (ZETIA) tablet 10 mg  10 mg Oral Daily    famotidine (PEPCID) tablet 20 mg  20 mg Oral Every Other Day    fluticasone (FLONASE) 50 mcg/act nasal spray 2 spray  2 spray Each Nare Daily    metoprolol succinate (TOPROL-XL) 24 hr tablet 100 mg  100 mg Oral Q12H [facility]    zolpidem (AMBIEN) tablet 10 mg  10 mg Oral HS PRN       Allergies   Allergen Reactions    Sotalol Other (See Comments)     Prolonged QTc leading to torsades de pointes     Penicillins Other (See Comments)     As a child calcium deposit in the arm     Ace Inhibitors GI Intolerance     Did feel well on it       Objective   Vitals: Blood pressure 158/88, pulse 89, temperature 98 1 °F (36 7 °C), temperature source Oral, resp   rate 18, weight 82 4 kg (181 lb 10 5 oz), last menstrual period 02/01/1990, SpO2 92 %, not currently breastfeeding , Body mass index is 33 23 kg/m² ,   Orthostatic Blood Pressures    Flowsheet Row Most Recent Value   Blood Pressure 158/88 filed at 06/14/2022 7856   Patient Position - Orthostatic VS Lying filed at 06/14/2022 1067            Intake/Output Summary (Last 24 hours) at 6/14/2022 [address] filed at 6/14/2022 1019  Gross per 24 hour   Intake 180 ml   Output 200 ml   Net -20 ml       Invasive Devices  Report    Peripheral Intravenous Line  Duration           Peripheral IV 06/13/22 Left Antecubital <1 day              Physical Exam:  Physical Exam  Vitals and nursing note reviewed  Constitutional:       General: She is not in acute distress  Appearance: She is obese  She is not diaphoretic  HENT:      Head: Normocephalic and atraumatic  Mouth/Throat:      Mouth: Mucous membranes are moist    Eyes:      General: No scleral icterus  Neck:      Comments: JVD  Cardiovascular:      Rate and Rhythm: Tachycardia present  Rhythm irregular  Pulses: Normal pulses  Heart sounds: Normal heart sounds  Pulmonary:      Effort: Pulmonary effort is normal       Breath sounds: Rales present  No wheezing  Abdominal:      Palpations: Abdomen is soft  Comments: Abdominal bloating   Musculoskeletal:      Cervical back: Neck supple  Right lower leg: Edema present  Left lower leg: Edema present  Skin:     General: Skin is warm and dry  Capillary Refill: Capillary refill takes less than 2 seconds  Neurological:      General: No focal deficit present  Mental Status: She is alert and oriented to person, place, and time     Psychiatric:         Mood and Affect: Mood normal          Lab Results:   Recent Results (from the past 24 hour(s))   CBC and differential    Collection Time: 06/13/22  4:23 PM   Result Value Ref Range    WBC 6 73 4 31 - 10 16 Thousand/uL    RBC 3 83 3 81 - 5 12 Million/uL    Hemoglobin 12 1 11 5 - 15 4 g/dL    Hematocrit 36 4 34 8 - 46 1 %    MCV 95 82 - 98 fL    MCH 31 6 26 8 - 34 3 pg    MCHC 33 2 31 4 - 37 4 g/dL    RDW 13 3 11 6 - 15 1 %    MPV 10 0 8 9 - 12 7 fL    Platelets 889 272 - 675 Thousands/uL    nRBC 0 /100 WBCs    Neutrophils Relative 64 43 - 75 %    Immat GRANS % 0 0 - 2 %    Lymphocytes Relative 21 14 - 44 %    Monocytes Relative 11 4 - 12 %    Eosinophils Relative 3 0 - 6 %    Basophils Relative 1 0 - 1 %    Neutrophils Absolute 4 30 1 85 - 7 62 Thousands/µL    Immature Grans Absolute 0 03 0 00 - 0 20 Thousand/uL    Lymphocytes Absolute 1 38 0 60 - 4 47 Thousands/µL    Monocytes Absolute 0 77 0 17 - 1 22 Thousand/µL    Eosinophils Absolute 0 19 0 00 - 0 61 Thousand/µL    Basophils Absolute 0 06 0 00 - 0 10 Thousands/µL   Comprehensive metabolic panel    Collection Time: 06/13/22  4:23 PM   Result Value Ref Range    Sodium 128 (L) 135 - 147 mmol/L    Potassium 4 1 3 5 - 5 3 mmol/L    Chloride 93 (L) 96 - 108 mmol/L    CO2 25 21 - 32 mmol/L    ANION GAP 10 4 - 13 mmol/L    BUN 23 5 - 25 mg/dL    Creatinine 1 34 (H) 0 60 - 1 30 mg/dL    Glucose 129 65 - 140 mg/dL    Calcium 9 1 8 4 - 10 2 mg/dL    AST 38 13 - 39 U/L    ALT 50 7 - 52 U/L    Alkaline Phosphatase 68 34 - 104 U/L    Total Protein 6 6 6 4 - 8 4 g/dL    Albumin 3 8 3 5 - 5 0 g/dL    Total Bilirubin 1 66 (H) 0 20 - 1 00 mg/dL    eGFR 38 ml/min/1 73sq m   HS Troponin 0hr (reflex protocol)    Collection Time: 06/13/22  4:23 PM   Result Value Ref Range    hs TnI 0hr 7 "Refer to ACS Flowchart"- see link ng/L   Magnesium    Collection Time: 06/13/22  4:23 PM   Result Value Ref Range    Magnesium 1 7 (L) 1 9 - 2 7 mg/dL   B-Type Natriuretic Peptide(BNP), [facility], [facility], [facility] Camp[facility] Only    Collection Time: 06/13/22  4:23 PM   Result Value Ref Range     (H) 0 - 100 pg/mL   TSH, 3rd generation with Free T4 reflex    Collection Time: 06/13/22  4:23 PM   Result Value Ref Range    TSH 3RD GENERATON 2 006 0 450 - 4 500 uIU/mL   HS Troponin I 2hr    Collection Time: 06/13/22  6:30 PM   Result Value Ref Range    hs TnI 2hr 6 "Refer to ACS Flowchart"- see link ng/L    Delta 2hr hsTnI -1 <20 ng/L   HS Troponin I 4hr    Collection Time: 06/13/22  9:31 PM   Result Value Ref Range    hs TnI 4hr 6 "Refer to ACS Flowchart"- see link ng/L    Delta 4hr hsTnI -1 <20 ng/L   Bilirubin, direct Collection Time: 06/13/22  9:31 PM   Result Value Ref Range    Bilirubin, Direct 0 38 (H) 0 00 - 0 20 mg/dL   Procalcitonin    Collection Time: 06/13/22  9:31 PM   Result Value Ref Range    Procalcitonin 0 13 <=0 25 ng/ml   Basic metabolic panel    Collection Time: 06/13/22 11:47 PM   Result Value Ref Range    Sodium 128 (L) 135 - 147 mmol/L    Potassium 3 7 3 5 - 5 3 mmol/L    Chloride 91 (L) 96 - 108 mmol/L    CO2 27 21 - 32 mmol/L    ANION GAP 10 4 - 13 mmol/L    BUN 20 5 - 25 mg/dL    Creatinine 1 14 0 60 - 1 30 mg/dL    Glucose 121 65 - 140 mg/dL    Calcium 9 3 8 4 - 10 2 mg/dL    eGFR 46 ml/min/1 73sq m   Basic metabolic panel    Collection Time: 06/14/22  5:40 AM   Result Value Ref Range    Sodium 128 (L) 135 - 147 mmol/L    Potassium 3 5 3 5 - 5 3 mmol/L    Chloride 92 (L) 96 - 108 mmol/L    CO2 28 21 - 32 mmol/L    ANION GAP 8 4 - 13 mmol/L    BUN 20 5 - 25 mg/dL    Creatinine 1 02 0 60 - 1 30 mg/dL    Glucose 122 65 - 140 mg/dL    Calcium 9 1 8 4 - 10 2 mg/dL    eGFR 53 ml/min/1 73sq m     Imaging: I have personally reviewed pertinent reports  and I have personally reviewed pertinent films in PACS  Code Status:  Level 1 full code  Epic/ Allscripts/Care Everywhere records reviewed:  Yes    * Please Note: Fluency DirectDictation voice to text software may have been used in the creation of this document   **

## 2022-06-14 NOTE — ASSESSMENT & PLAN NOTE
· POA Na: 125--> repeat 128 in setting of signs of volume overload w/ BNP of 928; reported confusion (as noticed by daughter), imbalance (since past few months) at home  · S/p 1 dose IV Lasix 20 mg in ED  · Na 128 on 06/14 a m -->129 on 06/14 afternoon    Plan:   · Encourage PO intake  · BMP q6h- Na correction rate: 6-12 mEq w/in 24 hrs  · Hold losartan for now  · Monitor Cr

## 2022-06-14 NOTE — ASSESSMENT & PLAN NOTE
Noted s/p Lasix IV 20 mg X1  In setting of CKD    Plan:   Monitor Cr level  Encourage PO intake  Lasix 40 mg IV scheduled for 6/13 AM

## 2022-06-14 NOTE — ASSESSMENT & PLAN NOTE
· Home meds: Toprol 100 mg QD, Cozaar 75 mg QD    Plan:   · Hold Cozaar in setting of rising Cr  · Toprol 125 mg b i d  (increased to previous home dosage as per Cardiology recommendations)  · Monitor vitals

## 2022-06-14 NOTE — ASSESSMENT & PLAN NOTE
· Mag 1 7   · Repleted 2g Mag Sulfate IV on 06/13    Plan:  · Monitor for electrolyte abnormalities and replete as appropriate

## 2022-06-14 NOTE — ASSESSMENT & PLAN NOTE
Wt Readings from Last 3 Encounters:   06/07/22 78 kg (172 lb)   05/16/22 76 7 kg (169 lb)   05/09/22 76 7 kg (169 lb 1 6 oz)     · POA: worsening SOB since 1-1 5 weeks, conversational dyspnea  Exam shows +1 pitting edema in b/l lower extremities, L>R  No JVD  Lungs CTA b/l     · BNP: 928   · CXR: CP angle blunting, pleural effusions on my read  · Last echo 5/16/21: EF 45%, Global hypokinesis, LA, RA dilated, TR (moderate)  · S/p IV lasix 20 mg in ED x1--> noted jump in Cr from 1 08-->1 34  · On 06/14 a m , creatinine stable    Plan:  · IV Lasix 40 mg BID  · Encourage PO intake  · Cardiology team actively following  · Strict I/Os  · Daily weights

## 2022-06-14 NOTE — ASSESSMENT & PLAN NOTE
Lab Results   Component Value Date    TBILI 1 66 (H) 06/13/2022    TBILI 1 79 (H) 06/13/2022    TBILI 0 79 04/01/2022     · Benign abdominal exam  · Pending  · Monitor via CMP

## 2022-06-14 NOTE — PROGRESS NOTES
[facility]  Progress Note - Kate Yuen 1945, 67 y o  female MRN: [id number]  Unit/Bed#: S -61 Encounter: [id number]  Primary Care Provider: Aileen Dowd MD   Date and time admitted to hospital: 6/13/2022  4:27 PM     * Acute heart failure [facility])  Assessment & Plan  Wt Readings from Last 3 Encounters:   06/07/22 78 kg (172 lb)   05/16/22 76 7 kg (169 lb)   05/09/22 76 7 kg (169 lb 1 6 oz)     · POA: worsening SOB since 1-1 5 weeks, conversational dyspnea  Exam shows +1 pitting edema in b/l lower extremities, L>R  No JVD  Lungs CTA b/l     · BNP: 928   · CXR: CP angle blunting, pleural effusions on my read  · Last echo 5/16/21: EF 45%, Global hypokinesis, LA, RA dilated, TR (moderate)  · S/p IV lasix 20 mg in ED x1--> noted jump in Cr from 1 08-->1 34  · On 06/14 a m , creatinine stable    Plan:  · IV Lasix 40 mg BID  · Encourage PO intake  · Cardiology team actively following  · Strict I/Os  · Daily weights      Hyponatremia  Assessment & Plan  · POA Na: 125--> repeat 128 in setting of signs of volume overload w/ BNP of 928; reported confusion (as noticed by daughter), imbalance (since past few months) at home  · S/p 1 dose IV Lasix 20 mg in ED  · Na 128 on 06/14 a m -->129 on 06/14 afternoon    Plan:   · Encourage PO intake  · BMP q6h- Na correction rate: 6-12 mEq w/in 24 hrs  · Hold losartan for now  · Monitor Cr    Paroxysmal atrial fibrillation (HCC)  Assessment & Plan  · Was supposed to have an ablation at [facility] on 06/14  · EKG shows rate controlled Afib     Plan:   · Toprol increased to 125 mg b i d , amiodarone 200 mg daily, Eliquis 5 mg b i d   · Monitor on telemetry  · Will attempt to reschedule ablation as per Cardiology team's recommendations    Hyperbilirubinemia  Assessment & Plan  Lab Results   Component Value Date    TBILI 1 66 (H) 06/13/2022    TBILI 1 79 (H) 06/13/2022    TBILI 0 79 04/01/2022     Elevated direct bilirubin noted (0 38)  Likely in the setting of acute heart failure exacerbation    · Benign abdominal exam  · Monitor via CMP      Meningioma [facility])  Assessment & Plan  · MRI brain in 4/22 demonstrated meningioma  · Follows w/ Neurosurgery outpatient  · Unlikely to be causing syx of imbalance, confusion  · Non focal neuro exam- hold off on [facility] for now  · Obtain CTH if GCS drops more than 2 pts in an hour or if pt has new deficits overnight  Confusion  Assessment & Plan  Possible Metabolic encephalopathy due to hyponatremia, as evidenced by altered mental status with confusion in a patient with hyponatremia, requiring treatment of hyponatremia and serial mental status exams  Elevated serum creatinine  Assessment & Plan  · Noted s/p Lasix IV 20 mg X1 in the ED  · In setting of CKD    Plan:   · Monitor Cr level in setting of IV diuresis  · Encourage PO intake    Hypomagnesemia  Assessment & Plan  · Mag 1 7   · Repleted 2g Mag Sulfate IV on 06/13    Plan:  · Monitor for electrolyte abnormalities and replete as appropriate      Insomnia  Assessment & Plan  · Held Zolpidem for now given confusion complaints     Hyperlipidemia  Assessment & Plan  · Continue Zetia 10 mg QD      Fibromyalgia  Assessment & Plan  · On gabapentin 300 mg x2 QHS  · Hold for now given complaints of confusion    Allergic rhinitis  Assessment & Plan  · Fluticasone 2 sprays QD    Essential hypertension  Assessment & Plan  · Home meds: Toprol 100 mg QD, Cozaar 75 mg QD    Plan:   · Hold Cozaar in setting of rising Cr  · Toprol 125 mg b i d  (increased to previous home dosage as per Cardiology recommendations)  · Monitor vitals      VTE Pharmacologic Prophylaxis: VTE Score: 5 High Risk (Score >/= 5) - Pharmacological DVT Prophylaxis Ordered: apixaban (Eliquis)  Sequential Compression Devices Ordered  Patient Centered Rounds: I performed bedside rounds with nursing staff today    Discussions with Specialists or Other Care Team Provider: Cardiology    Education and Discussions with Family / Patient: Updated  (daughter) via phone  Current Length of Stay: 1 day(s)  Current Patient Status: Inpatient   Discharge Plan: Anticipate discharge in 24-48 hrs to home  Code Status: Level 1 - Full Code    Subjective:   Patient seen and examined at bedside  She reports feeling better than history  She does not currently endorse any chest pain, abdominal pain, palpitations, worsening shortness of breath, urinary difficulty  She tells me she would like her daughter to be updated and I informed her that internal medicine team would update her daughter  Objective:     Vitals:   Temp (24hrs), Av 1 °F (36 7 °C), Min:97 8 °F (36 6 °C), Max:98 6 °F (37 °C)    Temp:  [97 8 °F (36 6 °C)-98 6 °F (37 °C)] 98 6 °F (37 °C)  HR:  [] 97  Resp:  [18] 18  BP: (142-176)/() 142/95  SpO2:  [92 %-97 %] 96 %  Body mass index is 33 23 kg/m²  Input and Output Summary (last 24 hours): Intake/Output Summary (Last 24 hours) at 2022 1533  Last data filed at 2022 1306  Gross per 24 hour   Intake 660 ml   Output 800 ml   Net -140 ml     Physical Exam   Vitals reviewed  General Examination:  Sitting in chair, cooperative   HEENT: Normocephalic, Atraumatic  Extraocular movements intact, PERRLA  CVS: S1, S2 noted  No JVD  Irregularly irregular rhythm  Lungs:  Normal effort  No conversational dyspnea  Abdomen: Soft, normal bowel sounds  Non distended, non tender  Ext:  Trace pitting edema bilaterally (improved since yesterday)  Psych: Though Process - logical    Skin: No bleeding/bruising noted  Neuro: A, Ox3  Follows simple, 3 steps commands  Appropriate antigravity strength in all 4 extremities proximally, distally        Additional Data:     Labs:  Results from last 7 days   Lab Units 22  1623   WBC Thousand/uL 6 73   HEMOGLOBIN g/dL 12 1   HEMATOCRIT % 36 4   PLATELETS Thousands/uL 275   NEUTROS PCT % 64   LYMPHS PCT % 21   MONOS PCT % 11   EOS PCT % 3 Results from last 7 days   Lab Units 06/14/22  1228 06/13/22  2347 06/13/22  1623   SODIUM mmol/L 129*   < > 128*   POTASSIUM mmol/L 3 4*   < > 4 1   CHLORIDE mmol/L 91*   < > 93*   CO2 mmol/L 27   < > 25   BUN mg/dL 20   < > 23   CREATININE mg/dL 1 10   < > 1 34*   ANION GAP mmol/L 11   < > 10   CALCIUM mg/dL 9 2   < > 9 1   ALBUMIN g/dL  --   --  3 8   TOTAL BILIRUBIN mg/dL  --   --  1 66*   ALK PHOS U/L  --   --  68   ALT U/L  --   --  50   AST U/L  --   --  38   GLUCOSE RANDOM mg/dL 134   < > 129    < > = values in this interval not displayed  Results from last 7 days   Lab Units 06/13/22  2131   PROCALCITONIN ng/ml 0 13       Lines/Drains:  Invasive Devices  Report    Peripheral Intravenous Line  Duration           Peripheral IV 06/13/22 Left Antecubital <1 day                  Telemetry:  Telemetry Orders (From admission, onward)             24 Hour Telemetry Monitoring  Continuous x 24 Hours (Telem)        References:    Telemetry Guidelines   Question:  Reason for 24 Hour Telemetry  Answer:  Metabolic/Electrolyte Disturbance with High Probability of Dysrhythmia (K level <3 or >6, or KCL infusion >=10mEq/hr)                 Telemetry Reviewed:  AFib, rate controlled  Indication for Continued Telemetry Use: Arrthymias requiring medical therapy             Imaging:  Reviewed imaging reports from this admission including:  XR chest 1 view portable   Final Result by Robbin Cortes MD (06/13 2124)      Small bilateral pleural effusions with extensive bibasilar atelectasis and/or infiltrates are seen, right greater than left                    Workstation performed: [id number]           Recent Cultures (last 7 days):         Last 24 Hours Medication List:   Current Facility-Administered Medications   Medication Dose Route Frequency Provider Last Rate    acetaminophen  650 mg Oral Q4H PRN Alfa Andres DO      amiodarone  200 mg Oral Daily Jennifer Spencer MD      apixaban  5 mg Oral BID Radha Lester MD      ascorbic acid  500 mg Oral Daily Radha Lester MD      cholecalciferol  400 Units Oral Daily Radha Lester MD      ezetimibe  10 mg Oral Daily Radha Lester MD      famotidine  20 mg Oral Every Other Day Radha Lester MD      fluticasone  2 spray Each Nare Daily Radha Lester MD      furosemide  40 mg Intravenous BID (diuretic) JENNY Gilmore      lidocaine  1 patch Topical Daily Radha Lester MD      metoprolol succinate  125 mg Oral Q12H [facility] JENNY Garcia      potassium chloride  20 mEq Oral BID JENNY Garcia      zolpidem  10 mg Oral HS PRN Edgar Qiu DO          Today, Patient Was Seen By: Radha Lester MD    **Please Note: This note may have been constructed using a voice recognition system  **

## 2022-06-14 NOTE — PLAN OF CARE
Problem: CARDIOVASCULAR - ADULT  Goal: Absence of cardiac dysrhythmias or at baseline rhythm  Description: INTERVENTIONS:  - Continuous cardiac monitoring, vital signs, obtain 12 lead EKG if ordered  - Administer antiarrhythmic and heart rate control medications as ordered  - Monitor electrolytes and administer replacement therapy as ordered  Outcome: Progressing     Problem: RESPIRATORY - ADULT  Goal: Achieves optimal ventilation and oxygenation  Description: INTERVENTIONS:  - Assess for changes in respiratory status  - Assess for changes in mentation and behavior  - Position to facilitate oxygenation and minimize respiratory effort  - Oxygen administered by appropriate delivery if ordered  - Initiate smoking cessation education as indicated  - Encourage broncho-pulmonary hygiene including cough, deep breathe, Incentive Spirometry  - Assess the need for suctioning and aspirate as needed  - Assess and instruct to report SOB or any respiratory difficulty  - Respiratory Therapy support as indicated  Outcome: Progressing     Problem: METABOLIC, FLUID AND ELECTROLYTES - ADULT  Goal: Electrolytes maintained within normal limits  Description: INTERVENTIONS:  - Monitor labs and assess patient for signs and symptoms of electrolyte imbalances  - Administer electrolyte replacement as ordered  - Monitor response to electrolyte replacements, including repeat lab results as appropriate  - Instruct patient on fluid and nutrition as appropriate  Outcome: Progressing

## 2022-06-14 NOTE — ASSESSMENT & PLAN NOTE
Possible Metabolic encephalopathy due to hyponatremia, as evidenced by altered mental status with confusion in a patient with hyponatremia, requiring treatment of hyponatremia and serial mental status exams

## 2022-06-14 NOTE — ASSESSMENT & PLAN NOTE
· Noted s/p Lasix IV 20 mg X1 in the ED  · In setting of CKD    Plan:   · Monitor Cr level in setting of IV diuresis  · Encourage PO intake

## 2022-06-14 NOTE — ASSESSMENT & PLAN NOTE
· MRI brain in 4/22 demonstrated meningioma  · Follows w/ Neurosurgery outpatient  · Unlikely to be causing syx of imbalance, confusion  · Non focal neuro exam- hold off on [facility] for now  · Obtain CTH if GCS drops more than 2 pts in an hour or if pt has new deficits overnight

## 2022-06-15 ENCOUNTER — APPOINTMENT (INPATIENT)
Dept: CT IMAGING | Facility: HOSPITAL | Age: 77
DRG: 291 | End: 2022-06-15
Payer: MEDICARE

## 2022-06-15 ENCOUNTER — TELEPHONE (OUTPATIENT)
Dept: FAMILY MEDICINE CLINIC | Facility: CLINIC | Age: 77
End: 2022-06-15

## 2022-06-15 ENCOUNTER — APPOINTMENT (INPATIENT)
Dept: NON INVASIVE DIAGNOSTICS | Facility: HOSPITAL | Age: 77
DRG: 291 | End: 2022-06-15
Payer: MEDICARE

## 2022-06-15 PROBLEM — R26.89 IMBALANCE: Status: ACTIVE | Noted: 2022-06-15

## 2022-06-15 LAB
ALBUMIN SERPL BCP-MCNC: 3.7 G/DL (ref 3.5–5)
ALP SERPL-CCNC: 63 U/L (ref 34–104)
ALT SERPL W P-5'-P-CCNC: 51 U/L (ref 7–52)
ANION GAP SERPL CALCULATED.3IONS-SCNC: 11 MMOL/L (ref 4–13)
ANION GAP SERPL CALCULATED.3IONS-SCNC: 13 MMOL/L (ref 4–13)
APICAL FOUR CHAMBER EJECTION FRACTION: 62 %
AST SERPL W P-5'-P-CCNC: 41 U/L (ref 13–39)
BILIRUB DIRECT SERPL-MCNC: 0.37 MG/DL (ref 0–0.2)
BILIRUB SERPL-MCNC: 1.6 MG/DL (ref 0.2–1)
BILIRUB SERPL-MCNC: 1.85 MG/DL (ref 0.2–1)
BUN SERPL-MCNC: 24 MG/DL (ref 5–25)
BUN SERPL-MCNC: 30 MG/DL (ref 5–25)
CALCIUM SERPL-MCNC: 9.4 MG/DL (ref 8.4–10.2)
CALCIUM SERPL-MCNC: 9.7 MG/DL (ref 8.4–10.2)
CHLORIDE SERPL-SCNC: 92 MMOL/L (ref 96–108)
CHLORIDE SERPL-SCNC: 94 MMOL/L (ref 96–108)
CO2 SERPL-SCNC: 28 MMOL/L (ref 21–32)
CO2 SERPL-SCNC: 30 MMOL/L (ref 21–32)
CREAT SERPL-MCNC: 1.07 MG/DL (ref 0.6–1.3)
CREAT SERPL-MCNC: 1.33 MG/DL (ref 0.6–1.3)
FOLATE SERPL-MCNC: >20 NG/ML (ref 3.1–17.5)
FRACTIONAL SHORTENING: 38 % (ref 28–44)
GFR SERPL CREATININE-BSD FRML MDRD: 38 ML/MIN/1.73SQ M
GFR SERPL CREATININE-BSD FRML MDRD: 50 ML/MIN/1.73SQ M
GLUCOSE SERPL-MCNC: 134 MG/DL (ref 65–140)
GLUCOSE SERPL-MCNC: 200 MG/DL (ref 65–140)
INTERVENTRICULAR SEPTUM IN DIASTOLE (PARASTERNAL SHORT AXIS VIEW): 0.8 CM
INTERVENTRICULAR SEPTUM: 0.8 CM (ref 0.6–1.1)
LEFT INTERNAL DIMENSION IN SYSTOLE: 3.1 CM (ref 2.1–4)
LEFT VENTRICLE DIASTOLIC VOLUME (MOD BIPLANE): 63 ML
LEFT VENTRICLE SYSTOLIC VOLUME (MOD BIPLANE): 31 ML
LEFT VENTRICULAR INTERNAL DIMENSION IN DIASTOLE: 5 CM (ref 3.5–6)
LEFT VENTRICULAR POSTERIOR WALL IN END DIASTOLE: 0.8 CM
LEFT VENTRICULAR STROKE VOLUME: 79 ML
LV EF: 51 %
LVSV (TEICH): 79 ML
MAGNESIUM SERPL-MCNC: 1.9 MG/DL (ref 1.9–2.7)
POTASSIUM SERPL-SCNC: 3.5 MMOL/L (ref 3.5–5.3)
POTASSIUM SERPL-SCNC: 3.5 MMOL/L (ref 3.5–5.3)
POTASSIUM SERPL-SCNC: 3.6 MMOL/L (ref 3.5–5.3)
PROT SERPL-MCNC: 6.5 G/DL (ref 6.4–8.4)
SL CV LV EF: 60
SL CV PED ECHO LEFT VENTRICLE DIASTOLIC VOLUME (MOD BIPLANE) 2D: 117 ML
SL CV PED ECHO LEFT VENTRICLE SYSTOLIC VOLUME (MOD BIPLANE) 2D: 38 ML
SODIUM SERPL-SCNC: 133 MMOL/L (ref 135–147)
SODIUM SERPL-SCNC: 135 MMOL/L (ref 135–147)
VIT B12 SERPL-MCNC: 2172 PG/ML (ref 100–900)

## 2022-06-15 PROCEDURE — 97163 PT EVAL HIGH COMPLEX 45 MIN: CPT

## 2022-06-15 PROCEDURE — 93308 TTE F-UP OR LMTD: CPT

## 2022-06-15 PROCEDURE — 70450 CT HEAD/BRAIN W/O DYE: CPT

## 2022-06-15 PROCEDURE — 82248 BILIRUBIN DIRECT: CPT

## 2022-06-15 PROCEDURE — 97167 OT EVAL HIGH COMPLEX 60 MIN: CPT

## 2022-06-15 PROCEDURE — 99232 SBSQ HOSP IP/OBS MODERATE 35: CPT | Performed by: INTERNAL MEDICINE

## 2022-06-15 PROCEDURE — 93308 TTE F-UP OR LMTD: CPT | Performed by: INTERNAL MEDICINE

## 2022-06-15 PROCEDURE — 97116 GAIT TRAINING THERAPY: CPT

## 2022-06-15 PROCEDURE — 83735 ASSAY OF MAGNESIUM: CPT | Performed by: NURSE PRACTITIONER

## 2022-06-15 PROCEDURE — 99233 SBSQ HOSP IP/OBS HIGH 50: CPT | Performed by: INTERNAL MEDICINE

## 2022-06-15 PROCEDURE — 80048 BASIC METABOLIC PNL TOTAL CA: CPT

## 2022-06-15 PROCEDURE — 80053 COMPREHEN METABOLIC PANEL: CPT

## 2022-06-15 PROCEDURE — G1004 CDSM NDSC: HCPCS

## 2022-06-15 PROCEDURE — 82247 BILIRUBIN TOTAL: CPT

## 2022-06-15 PROCEDURE — 84132 ASSAY OF SERUM POTASSIUM: CPT | Performed by: NURSE PRACTITIONER

## 2022-06-15 PROCEDURE — 82607 VITAMIN B-12: CPT

## 2022-06-15 PROCEDURE — 82746 ASSAY OF FOLIC ACID SERUM: CPT

## 2022-06-15 RX ORDER — METOPROLOL TARTRATE 5 MG/5ML
5 INJECTION INTRAVENOUS ONCE
Status: COMPLETED | OUTPATIENT
Start: 2022-06-15 | End: 2022-06-15

## 2022-06-15 RX ORDER — ZOLPIDEM TARTRATE 5 MG/1
5 TABLET ORAL
Status: DISCONTINUED | OUTPATIENT
Start: 2022-06-15 | End: 2022-06-16

## 2022-06-15 RX ORDER — LOSARTAN POTASSIUM 50 MG/1
50 TABLET ORAL DAILY
Status: DISCONTINUED | OUTPATIENT
Start: 2022-06-15 | End: 2022-06-17 | Stop reason: HOSPADM

## 2022-06-15 RX ORDER — ROPINIROLE 0.25 MG/1
0.5 TABLET, FILM COATED ORAL
Status: DISCONTINUED | OUTPATIENT
Start: 2022-06-15 | End: 2022-06-17 | Stop reason: HOSPADM

## 2022-06-15 RX ORDER — DULOXETIN HYDROCHLORIDE 30 MG/1
30 CAPSULE, DELAYED RELEASE ORAL DAILY
Status: DISCONTINUED | OUTPATIENT
Start: 2022-06-15 | End: 2022-06-17 | Stop reason: HOSPADM

## 2022-06-15 RX ORDER — GABAPENTIN 100 MG/1
100 CAPSULE ORAL 2 TIMES DAILY
Status: DISCONTINUED | OUTPATIENT
Start: 2022-06-15 | End: 2022-06-16

## 2022-06-15 RX ORDER — QUETIAPINE FUMARATE 25 MG/1
25 TABLET, FILM COATED ORAL ONCE
Status: COMPLETED | OUTPATIENT
Start: 2022-06-15 | End: 2022-06-15

## 2022-06-15 RX ORDER — OLANZAPINE 10 MG/1
5 INJECTION, POWDER, LYOPHILIZED, FOR SOLUTION INTRAMUSCULAR ONCE
Status: COMPLETED | OUTPATIENT
Start: 2022-06-15 | End: 2022-06-15

## 2022-06-15 RX ORDER — DIGOXIN 0.25 MG/ML
250 INJECTION INTRAMUSCULAR; INTRAVENOUS EVERY 6 HOURS
Status: COMPLETED | OUTPATIENT
Start: 2022-06-15 | End: 2022-06-16

## 2022-06-15 RX ADMIN — WATER 10 ML: 1 INJECTION INTRAMUSCULAR; INTRAVENOUS; SUBCUTANEOUS at 04:19

## 2022-06-15 RX ADMIN — FUROSEMIDE 40 MG: 10 INJECTION, SOLUTION INTRAMUSCULAR; INTRAVENOUS at 09:02

## 2022-06-15 RX ADMIN — EZETIMIBE 10 MG: 10 TABLET ORAL at 18:38

## 2022-06-15 RX ADMIN — POTASSIUM CHLORIDE 20 MEQ: 1500 TABLET, EXTENDED RELEASE ORAL at 18:40

## 2022-06-15 RX ADMIN — OLANZAPINE 5 MG: 10 INJECTION, POWDER, FOR SOLUTION INTRAMUSCULAR at 04:16

## 2022-06-15 RX ADMIN — DULOXETINE HYDROCHLORIDE 30 MG: 30 CAPSULE, DELAYED RELEASE ORAL at 09:51

## 2022-06-15 RX ADMIN — QUETIAPINE FUMARATE 25 MG: 25 TABLET ORAL at 03:54

## 2022-06-15 RX ADMIN — METOPROLOL TARTRATE 5 MG: 5 INJECTION INTRAVENOUS at 01:45

## 2022-06-15 RX ADMIN — FAMOTIDINE 20 MG: 20 TABLET ORAL at 08:58

## 2022-06-15 RX ADMIN — ZOLPIDEM TARTRATE 5 MG: 5 TABLET ORAL at 21:09

## 2022-06-15 RX ADMIN — OXYCODONE HYDROCHLORIDE AND ACETAMINOPHEN 500 MG: 500 TABLET ORAL at 08:58

## 2022-06-15 RX ADMIN — FUROSEMIDE 40 MG: 10 INJECTION, SOLUTION INTRAMUSCULAR; INTRAVENOUS at 16:52

## 2022-06-15 RX ADMIN — GABAPENTIN 100 MG: 100 CAPSULE ORAL at 18:40

## 2022-06-15 RX ADMIN — ROPINIROLE 0.5 MG: 0.25 TABLET, FILM COATED ORAL at 21:09

## 2022-06-15 RX ADMIN — DIGOXIN 250 MCG: 0.25 INJECTION INTRAMUSCULAR; INTRAVENOUS at 22:35

## 2022-06-15 RX ADMIN — LIDOCAINE 5% 1 PATCH: 700 PATCH TOPICAL at 09:04

## 2022-06-15 RX ADMIN — GABAPENTIN 100 MG: 100 CAPSULE ORAL at 09:51

## 2022-06-15 RX ADMIN — AMIODARONE HYDROCHLORIDE 200 MG: 200 TABLET ORAL at 08:58

## 2022-06-15 RX ADMIN — DIGOXIN 250 MCG: 0.25 INJECTION INTRAMUSCULAR; INTRAVENOUS at 11:38

## 2022-06-15 RX ADMIN — METOPROLOL SUCCINATE 125 MG: 100 TABLET, EXTENDED RELEASE ORAL at 21:09

## 2022-06-15 RX ADMIN — APIXABAN 5 MG: 5 TABLET, FILM COATED ORAL at 08:58

## 2022-06-15 RX ADMIN — METOPROLOL SUCCINATE 125 MG: 100 TABLET, EXTENDED RELEASE ORAL at 08:57

## 2022-06-15 RX ADMIN — APIXABAN 5 MG: 5 TABLET, FILM COATED ORAL at 18:38

## 2022-06-15 RX ADMIN — CHOLECALCIFEROL TAB 10 MCG (400 UNIT) 400 UNITS: 10 TAB at 08:57

## 2022-06-15 RX ADMIN — POTASSIUM CHLORIDE 20 MEQ: 1500 TABLET, EXTENDED RELEASE ORAL at 08:58

## 2022-06-15 RX ADMIN — DIGOXIN 250 MCG: 0.25 INJECTION INTRAMUSCULAR; INTRAVENOUS at 16:51

## 2022-06-15 RX ADMIN — LOSARTAN POTASSIUM 50 MG: 50 TABLET, FILM COATED ORAL at 12:21

## 2022-06-15 NOTE — PLAN OF CARE
Problem: OCCUPATIONAL THERAPY ADULT  Goal: Performs self-care activities at highest level of function for planned discharge setting  See evaluation for individualized goals  Description:   Outcome: Progressing  Note: Limitation: Decreased ADL status, Decreased Safe judgement during ADL, Decreased cognition, Decreased endurance, Decreased self-care trans, Decreased high-level ADLs  Prognosis: Good  Assessment: Pt is a 67 y o  female seen for OT evaluation at [address] [place] [address], admitted 6/13/2022 w/ Acute heart failure ([facility] [address] )  OT completed extensive review of pt's medical and social history  Comorbidities affecting pt's functional performance at time of assessment include: HTN, fibromyalgia, aFib, confusion, imbalance, OA, osteopenia, neuropathy, diplopia, tremors, pacemaker, CHF  Personal factors affecting pt at time of IE include:steps to enter environment, limited home support, behavioral pattern, difficulty performing ADLS, difficulty performing IADLS , limited insight into deficits, decreased initiation and engagement , health management  and environment  Prior to admission, pt was living alone in [address] and was independent with ADL  Daughter lives nearby and assists with IADLs as she is able  Pt reports increased difficulty with ADLs recently, as well as several falls due to loss of balance  Upon evaluation, pt presents to OT below baseline due to the following performance deficits: weakness, decreased strength, decreased balance, decreased tolerance, impaired memory, impaired problem solving, impulsivity and decreased safety awareness  Pt with near fall in bathroom, requiring use of grab bars and OT assist to prevent  Pt is high fall risk  Pt to benefit from continued skilled OT tx while in the hospital to address deficits as defined above and maximize level of functional independence w ADL's and functional mobility   Occupational Performance areas to address include: grooming, bathing/shower, toilet hygiene, dressing, functional mobility and functional transfer, bed mobility  The patient's raw score on the AM-PAC Daily Activity inpatient short form is 16, standardized score is 35 96, less than 39 4  Patients at this level are likely to benefit from DC to post-acute rehabilitation services  Based on findings, pt is of high complexity  At this time, OT recommendations at time of discharge are short term rehab       OT Discharge Recommendation: Post acute rehabilitation services

## 2022-06-15 NOTE — OCCUPATIONAL THERAPY NOTE
Occupational Therapy Evaluation      Kate Yuen    6/15/2022    Principal Problem:    Acute heart failure ([address] )  Active Problems:    Essential hypertension    Allergic rhinitis    Fibromyalgia    Hyperlipidemia    Insomnia    Paroxysmal atrial fibrillation (HCC)    Meningioma (HCC)    Hypomagnesemia    Hyponatremia    Hyperbilirubinemia    Elevated serum creatinine    Confusion    Imbalance      Past Medical History:   Diagnosis Date    Actinic keratosis     last assessed - [id number]    Arthritis     Atrial fibrillation with rapid ventricular response ([facility] [address] )     last assessed - 26Apr2016    Basal cell carcinoma     Benign colon polyp     last assessed - 27Apr2015    Disease of thyroid gland     Effusion of knee joint right     Resolved - [id number]    Esophageal reflux     Fibromyalgia     last assessed - [id number]    Fibromyalgia, primary     GERD (gastroesophageal reflux disease)     Hypertension     Palpitations     last assessed - 30Apr2013    Peroneal tendonitis, right     last assessed - 02Oct2013    Right lumbar radiculopathy 3/17/2016    Thyroid cancer ([facility] [address] )     Thyroid nodule     Trochanteric bursitis of left hip 3/9/2018       Past Surgical History:   Procedure Laterality Date    CATARACT EXTRACTION Bilateral     COLONOSCOPY  03/2018    EYE SURGERY      HYSTERECTOMY      JOINT REPLACEMENT Left     knee    NE REVISE MEDIAN N/CARPAL TUNNEL SURG Right 11/14/2019    Procedure: RELEASE CARPAL TUNNEL;  Surgeon: Holden Caceres MD;  Location: [facility];  Service: Orthopedics    NE THYROID LOBECTOMY,UNILAT Left 12/16/2020    Procedure: Left THYROID LOBECTOMY;  Surgeon: Taylor Ignacio MD;  Location: [facility];  Service: ENT    RECTAL POLYPECTOMY      REPLACEMENT TOTAL KNEE Left     last assessed - 27Apr2015    TONSILLECTOMY      TOTAL ABDOMINAL HYSTERECTOMY      [address] [place] [address] THYROID BIOPSY  10/14/2020        06/15/22 0950   OT Last Visit   OT Visit Date 06/15/22   Note Type   Note type Evaluation   Restrictions/Precautions   Weight Bearing Precautions Per Order No   Other Precautions Fall Risk;Cognitive; Bed Alarm; Chair Alarm;1:1;Impulsive   Pain Assessment   Pain Assessment Tool Sampson-Baker FACES   Sampson-Baker FACES Pain Rating 2   Pain Location/Orientation Location: Rib Cage  (with UE movement)   Home Living   Type of Home House   Home Layout One level  (2STE)   Bathroom Shower/Tub Walk-in shower   Bathroom Equipment Grab bars in shower; Shower chair   Home Equipment Walker;Cane   Prior Function   Level of [place] Independent with ADLs and functional mobility; Needs assistance with IADLs   Lives With Alone   Receives Help From Family  (daughter lives next door)   ADL Assistance Independent   IADLs Needs assistance  (daughter assists with heavy IADLs)   Falls in the last 6 months 5 to 10  (one near fall during evaluation)   Comments Reports recent increased difficulty with ADLs  Pt reports she drives locally  Psychosocial   Psychosocial (WDL) WDL   ADL   Eating Assistance 7  Independent   Grooming Assistance 5  Supervision/Setup   UB Bathing Assistance 3  Moderate Assistance   LB Bathing Assistance 3  Moderate Assistance   UB Dressing Assistance 4  Minimal Assistance   LB Dressing Assistance 3  Moderate [address]  3  Moderate Assistance   Additional Comments Pt with LOB during toileting  Required use of grab bar and therapist assist to avoid fall     Bed Mobility   Additional Comments Pt received OOB; up in chair at end of session   Transfers   Sit to Stand 4  Minimal assistance   Stand to Sit 4  Minimal assistance   Stand pivot 4  Minimal assistance  (RW)   Functional Mobility   Functional Mobility 4  Minimal assistance   Additional Comments x1; poor safety, fall risk   Additional items Rolling walker   Activity Tolerance   Activity Tolerance Patient tolerated treatment well   Nurse Made Aware COLLEEN Baltazar   RUKOLE Assessment   RUE Assessment WFL   LUE Assessment   LUE Assessment WFL   Hand Function   Gross Motor Coordination Functional   Fine Motor Coordination Functional   Cognition   Overall Cognitive Status Impaired   Arousal/Participation Cooperative   Attention Difficulty attending to directions   Orientation Level Oriented to person;Disoriented to place; Disoriented to time;Disoriented to situation   Memory Decreased long term memory;Decreased recall of biographical information;Decreased short term memory;Decreased recall of recent events;Decreased recall of precautions   Following Commands Follows one step commands with increased time or repetition   Comments (S)  Pt noted to be delirious and agitated overnight  On 1:1 during evaluation  Pt with confusion, forgetfullness, and poor safety/insight  Pt reports she drives locally; OT does not feel this is safe at this time and recommend a formal driving evaluation prior to resuming driving  Pt would also benefit from a MOCA  Assessment   Limitation Decreased ADL status; Decreased Safe judgement during ADL;Decreased cognition;Decreased endurance;Decreased self-care trans;Decreased high-level ADLs   Prognosis Good   Assessment Pt is a 67 y o  female seen for OT evaluation at [facility], admitted 6/13/2022 w/ Acute heart failure ([facility] [address] )  OT completed extensive review of pt's medical and social history  Comorbidities affecting pt's functional performance at time of assessment include: HTN, fibromyalgia, aFib, confusion, imbalance, OA, osteopenia, neuropathy, diplopia, tremors, pacemaker, CHF  Personal factors affecting pt at time of IE include:steps to enter environment, limited home support, behavioral pattern, difficulty performing ADLS, difficulty performing IADLS , limited insight into deficits, decreased initiation and engagement , health management  and environment  Prior to admission, pt was living alone in [address] and was independent with ADL  Daughter lives nearby and assists with IADLs as she is able  Pt reports increased difficulty with ADLs recently, as well as several falls due to loss of balance  Upon evaluation, pt presents to OT below baseline due to the following performance deficits: weakness, decreased strength, decreased balance, decreased tolerance, impaired memory, impaired problem solving, impulsivity and decreased safety awareness  Pt with near fall in bathroom, requiring use of grab bars and OT assist to prevent  Pt is high fall risk  Pt to benefit from continued skilled OT tx while in the hospital to address deficits as defined above and maximize level of functional independence w ADL's and functional mobility  Occupational Performance areas to address include: grooming, bathing/shower, toilet hygiene, dressing, functional mobility and functional transfer, bed mobility  The patient's raw score on the AM-PAC Daily Activity inpatient short form is 16, standardized score is 35 96, less than 39 4  Patients at this level are likely to benefit from DC to post-acute rehabilitation services  Based on findings, pt is of high complexity  At this time, OT recommendations at time of discharge are short term rehab  Goals   Patient Goals get better   Plan   Treatment Interventions ADL retraining;Functional transfer training;UE strengthening/ROM; Cognitive reorientation; Endurance training;Patient/family training;Equipment evaluation/education; Compensatory technique education;Continued evaluation; Energy conservation   Goal Expiration Date 06/25/22   OT Frequency 3-5x/wk   Recommendation   OT Discharge Recommendation Post acute rehabilitation services   [facility] Daily Activity Inpatient   Lower Body Dressing 2   Bathing 2   Toileting 2   Upper Body Dressing 3   Grooming 3   Eating 4   Daily Activity Raw Score 16   Daily Activity Standardized Score (Calc for Raw Score >=11) 35 96   AM-[facility] Applied Cognition Inpatient   Following a Speech/Presentation 2   Understanding Ordinary Conversation 3   Taking Medications 2 Remembering Where Things Are Placed or Put Away 2   Remembering List of 4-5 Errands 2   Taking Care of Complicated Tasks 2   Applied Cognition Raw Score 13   Applied Cognition Standardized Score 30 46     Pt will achieve the following goals within 10 days  *Pt will complete grooming with independence  *Pt will complete UB bathing and dressing with independence  *Pt will complete LB bathing and dressing with independence   *Pt will complete toileting (hygiene and clothing management) with independence    *Pt will complete bed mobility with independence, with bed flat and no side rail to prep for purposeful tasks    *Pt will perform functional transfers with modified independence in order to complete ADL routine  *Pt will increase standing tolerance to 5+ minutes in order to complete ADL routine  *Pt will complete item retrieval and light home management with supervision while demonstrating good safety  *Pt will demonstrate increased activity tolerance in order to complete ADL routine  *Pt will participate in cognitive assessment to determine level of safety for returning home    *Pt will participate in UE therapeutic exercise in order to maximize strength for ADL transfers  *Pt will sit on EOB for 10+ minutes for increased safety with seated activity tolerance during ADL tasks  *Pt will identify 3-5 fall risks to ensure safety upon discharge      [place], MS, OTR/L

## 2022-06-15 NOTE — PLAN OF CARE
Problem: CARDIOVASCULAR - ADULT  Goal: Absence of cardiac dysrhythmias or at baseline rhythm  Description: INTERVENTIONS:  - Continuous cardiac monitoring, vital signs, obtain 12 lead EKG if ordered  - Administer antiarrhythmic and heart rate control medications as ordered  - Monitor electrolytes and administer replacement therapy as ordered  Outcome: Progressing     Problem: RESPIRATORY - ADULT  Goal: Achieves optimal ventilation and oxygenation  Description: INTERVENTIONS:  - Assess for changes in respiratory status  - Assess for changes in mentation and behavior  - Position to facilitate oxygenation and minimize respiratory effort  - Oxygen administered by appropriate delivery if ordered  - Initiate smoking cessation education as indicated  - Encourage broncho-pulmonary hygiene including cough, deep breathe, Incentive Spirometry  - Assess the need for suctioning and aspirate as needed  - Assess and instruct to report SOB or any respiratory difficulty  - Respiratory Therapy support as indicated  Outcome: Progressing     Problem: METABOLIC, FLUID AND ELECTROLYTES - ADULT  Goal: Electrolytes maintained within normal limits  Description: INTERVENTIONS:  - Monitor labs and assess patient for signs and symptoms of electrolyte imbalances  - Administer electrolyte replacement as ordered  - Monitor response to electrolyte replacements, including repeat lab results as appropriate  - Instruct patient on fluid and nutrition as appropriate  Outcome: Progressing     Problem: Potential for Falls  Goal: Patient will remain free of falls  Description: INTERVENTIONS:  - Educate patient/family on patient safety including physical limitations  - Instruct patient to call for assistance with activity   - Consult OT/PT to assist with strengthening/mobility   - Keep Call bell within reach  - Keep bed low and locked with side rails adjusted as appropriate  - Keep care items and personal belongings within reach  - Initiate and maintain comfort rounds  - Make Fall Risk Sign visible to staff  - Offer Toileting every two hours, in advance of need  - Initiate/Maintain bed and chair alarm  - Obtain necessary fall risk management equipment:   - Apply yellow socks and bracelet for high fall risk patients  - Consider moving patient to room near nurses station  Outcome: Progressing

## 2022-06-15 NOTE — ASSESSMENT & PLAN NOTE
Wt Readings from Last 3 Encounters:   06/15/22 77 4 kg (170 lb 10 2 oz)   06/07/22 78 kg (172 lb)   05/16/22 76 7 kg (169 lb)     · POA: worsening SOB since 1-1 5 weeks, conversational dyspnea  Exam shows +1 pitting edema in b/l lower extremities, L>R  No JVD  Lungs CTA b/l  · BNP: 928   · CXR: CP angle blunting, pleural effusions on my read  · Last echo 5/16/21: EF 45%, Global hypokinesis, LA, RA dilated, TR (moderate)  · S/p IV lasix 20 mg in ED x1--> noted jump in Cr from 1 08-->1 34  · On 06/15 a m , creatinine stable status post IV diuresis    Etiology:  Acute heart failure likely secondary to uncontrolled atrial fibrillation    Plan:  · IV Lasix 40 mg BID; suboptimal diuresis noted on 06/15 a m  · Repeat TTE to assess for LVEF function  · transition to p o   Diuretics as per cardiology team recommendations  · Encourage PO intake  · Strict I/Os  · Daily weights

## 2022-06-15 NOTE — PLAN OF CARE
Problem: PHYSICAL THERAPY ADULT  Goal: Performs mobility at highest level of function for planned discharge setting  See evaluation for individualized goals  Description: Treatment/Interventions: Functional transfer training, LE strengthening/ROM, Elevations, Therapeutic exercise, Endurance training, Cognitive reorientation, Patient/family training, Equipment eval/education, Bed mobility, Gait training          See flowsheet documentation for full assessment, interventions and recommendations  Note: Prognosis: Fair  Problem List: Decreased strength, Decreased endurance, Impaired balance, Decreased mobility, Decreased coordination, Decreased safety awareness, Decreased cognition, Impaired tone  Assessment: Pt presents with complaints of progressively worsening SOB, confusion, abnormal outpatient lab work  Dx: acute heart failure, hyponatremia, hyperbilirubinemia, meningioma, imbalance, and confusion  order placed for PT eval and tx  pt presents w/ comorbidities of a-fib, CHF, CKD, meningioma, arthritis, HTN, palpitations, lumbar radiculopathy, left TKR, and fibromyalgia and personal factors of advanced age, mobilizing w/ assistive device, stair(s) to enter home, limited home support and positive fall history  pt presents w/ weakness, decreased endurance, impaired cognition, impaired balance, gait deviations, impaired coordination, impaired tone, decreased safety awareness and fall risk  these impairments are evident in findings from physical examination (weakness, impaired coordination and impaired tone), mobility assessment (need for mod assist w/ all phases of mobility when usually mobilizing independently, tolerance to only 8 feet of ambulation and need for cueing for mobility technique), and Barthel Index: 55/100  pt needed input for task focus and mobility technique  pt is at risk for falls due to physical and safety awareness deficits   pt's clinical presentation is unstable/unpredictable (evident in tachycardia, need for assist w/ all phases of mobility when usually mobilizing independently, tolerance to only 8 feet of ambulation, lightheadedness impacting overall mobility status and need for input for task focus and mobility technique)  pt needs inpatient PT tx to improve mobility deficits and progress mobility training as appropriate  discharge recommendation is for inpatient rehab to reduce fall risk and maximize level of functional independence  pt would benefit from Gerontology consult to address cognition and aging related issues           PT Discharge Recommendation: Post acute rehabilitation services          See flowsheet documentation for full assessment

## 2022-06-15 NOTE — ASSESSMENT & PLAN NOTE
Lab Results   Component Value Date    TBILI 1 85 (H) 06/15/2022    TBILI 1 66 (H) 06/13/2022    TBILI 1 79 (H) 06/13/2022     Elevated direct bilirubin noted (0 38)  Likely in the setting of acute heart failure exacerbation    · Benign abdominal exam  · Monitor via CMP

## 2022-06-15 NOTE — ASSESSMENT & PLAN NOTE
· MRI brain in 4/22 demonstrated meningioma  · Follows w/ Neurosurgery outpatient  · Unlikely to be causing syx of imbalance, confusion  · Non focal neuro exam- hold off on [address] for now  · Obtain CTH if pt has new deficits overnight

## 2022-06-15 NOTE — ASSESSMENT & PLAN NOTE
· Etiology:  Possible metabolic encephalopathy due to hyponatremia, as evidenced by altered mental status with confusion in a patient with hyponatremia, requiring treatment of hyponatremia and serial mental status exams  · Overnight on 06/1-6/15:  Patient became agitated, had to be given Seroquel, Zyprexa and put on restraints    · CT head, 6/15:  Negative for acute pathology  · Vitamin B12, folate levels noted to be elevated  · UA shows trace leukocytes    Lab Results   Component Value Date    [id number] 2,172 (H) 06/15/2022    FOLATE >20 0 (H) 06/15/2022       Plan:  · Delirium precautions  · Avoid sedating medications  · Restarted home medications including:  Cymbalta, gabapentin, zolpidem  · Advised patient to stop taking any vitamin B supplements at home

## 2022-06-15 NOTE — QUICK NOTE
Several calls for this patient overnight  Initially patient was having AFib with RVR  Heart rates were in the 110-120s  Patient had no symptoms at that time  Was 1 dose IV Lopressor 5 mg and rates improved to 100-110s  Later in the night patient became delirious and agitated  Restraints ordered because patient was aggressive in 1 to the bed  One dose Seroquel 25 mg given  This does did not work patient was able get free from restraints and was very agitated  Control team was needed to be called  Zyprexa 5 mg given IM  Continuous observation ordered

## 2022-06-15 NOTE — ASSESSMENT & PLAN NOTE
Lab Results   Component Value Date    TBILI 1 60 (H) 06/15/2022    TBILI 1 85 (H) 06/15/2022    TBILI 1 66 (H) 06/13/2022     Elevated direct bilirubin noted (0 38)  Likely in the setting of acute heart failure exacerbation    · Benign abdominal exam  · Monitor via CMP

## 2022-06-15 NOTE — PROGRESS NOTES
General Cardiology   Progress Note -  Team One   Lulú Gonzales 67 y o  female MRN: [id number]    Unit/Bed#: S -01 Encounter: [id number]    Assessment  1  Acute on chronic HFmrEF - likely provoked in the setting of # 2  -On PE - volume status appears to have improved, but still remains mildly volume overloaded  Mild JVD, abdominal bloating, and mild nonpitting bilateral lower extremity edema   -Subjectively denies SOB at rest, does have mild JOHNSON   -NT proBNP level 928  -Chest x-ray 6/13 - pulmonary vascular congestion, small bilateral pleural effusions  TTE 5/16/2022 - LVEF 45% (EF previously 55% (1/2021) , mild global hypokinesis, RV mildly dilated/systolic function mildly reduced, LE mildly dilated, RA mildly dilated, mild-to-moderate MR, moderate TR, severely elevated RV systolic pressure  -Previously on no oral diuretics as an outpatient  -Currently on IV furosemide 40 mg b i d   -24 hour I&O balance; -140 ml ? - likely inaccurate   2  Recurrent/symptomatic persistent atrial fibrillation with RVR  -Complex AFib history  -S/p prior AFib ablation with PVI performed w/ cryo balloon (2/2021) - developed symptomatic bradycardia and underwent subsequent MDT DC ppm implant (3/2021)  -Follows w/ Dr Joy Marvin with [facility] and now Tad Iftikhar Sanchez - tentative plan for repeat ablation procedure arranged as an outpatient at [facility]   -12 lead ECG 6/13 - atrial fibrillation, rate 85 bpm  -24 hour telemetry review;  atrial fibrillation, rates remain suboptimally controlled currently ranging in the upper 90s to 110s at rest, and with exertion up in the 120-130 range   -On amiodarone 200 mg daily and metoprolol succinate 125 mg b i d   -On Eliquis 5 mg b i d  for systemic anticoagulation  3  MDT PPM in situ  -PPM check in the EP office 5/9/2022 -- showing persistent AF since March 2022 with AVG HR of 100 bpm but max HR's into the 150's daily  -Device interrogation 3/31/2022 - 100% AFib burden    Normal device function  4  Hyponatremia  -Resolved  -Na + level 125 on admission, currently 135    5  Dyslipidemia  -Lipid profile 4/2021 - cholesterol 176, triglycerides 112, HDL 66, and LDL 88    -On Zetia 10 mg daily  6  Hypertension  -Average /90, last recorded 165/93   -Outpatient BP regimen; losartan 50 mg b i d  and metoprolol succinate 100 mg b i d   -Inpatient BP regimen; metoprolol succinate 125 mg b i d, and IV diuresis  7  Thyroid nodule s/p left thyroid lobectomy (12/16/2020)  -TSH 2 0     Plan  -F/u limited TTE to reassess LV systolic function   -Volume status improved but still remains mildly volume overloaded  Continue IV Lasix 40 mg b i d + Kdur 20 meq BID  I & O documentation is inaccurate, will request a standing scale weight today and on a daily basis as ordered    -Heart rates remain suboptimally controlled in atrial fibrillation  Suspect this is the driving factor for her acute CHF exacerbation  Will add IV digoxin 1g IV load over the next 24 hours  Continue oral amiodarone 200 mg daily, and Toprol  mg b i d  Eventual plan for repeat atrial fibrillation ablation/PVI at [place] as an outpatient     -Restart losartan 50 mg daily  -Monitor renal function electrolytes closely, replete to maintain K +level 4 0, magnesium 2 0   -Continue to monitor closely on telemetry  Subjective  Review of Systems   Constitutional: Positive for malaise/fatigue  Negative for chills and fever  Eyes: Negative for visual disturbance  Cardiovascular: Positive for dyspnea on exertion and leg swelling  Negative for chest pain, orthopnea and palpitations  Respiratory: Negative for cough and shortness of breath  Gastrointestinal: Positive for bloating  Negative for abdominal pain  Neurological: Positive for light-headedness  Negative for dizziness and headaches  Objective:   Physical Exam  Vitals and nursing note reviewed  Constitutional:       General: She is not in acute distress  Appearance: She is not diaphoretic  HENT:      Head: Normocephalic and atraumatic  Mouth/Throat:      Mouth: Mucous membranes are moist    Eyes:      General: No scleral icterus  Neck:      Comments: JVD  Cardiovascular:      Rate and Rhythm: Tachycardia present  Rhythm irregular  Pulses: Normal pulses  Heart sounds: Normal heart sounds  Pulmonary:      Effort: Pulmonary effort is normal       Breath sounds: Normal breath sounds  No wheezing  Comments: Lower lung fields are diminished but clear  Abdominal:      General: Bowel sounds are normal       Palpations: Abdomen is soft  Tenderness: There is no abdominal tenderness  Comments: Abdominal bloating  Musculoskeletal:      Cervical back: Neck supple  Right lower leg: Edema present  Left lower leg: Edema present  Comments: Mild nonpitting bilateral lower extremity edema  Skin:     General: Skin is warm and dry  Capillary Refill: Capillary refill takes less than 2 seconds  Neurological:      Mental Status: She is alert  She is disoriented  Comments: Alert to person  Disoriented to place, time, and situation  Otherwise appears nonfocal     Psychiatric:         Mood and Affect: Mood normal          Vitals: Blood pressure 165/93, pulse (!) 110, temperature 98 2 °F (36 8 °C), temperature source Oral, resp  rate 16, height 5' 2" (1 575 m), weight 77 1 kg (170 lb), last menstrual period 02/01/1990, SpO2 93 %, not currently breastfeeding ,     Body mass index is 31 09 kg/m²  ,   Systolic (48ZQY), RPI:935 , Min:142 , KDJ:448     Diastolic (24VMU), LGQ:80, Min:81, Max:95      Intake/Output Summary (Last 24 hours) at 6/15/2022 1030  Last data filed at 6/14/2022 1306  Gross per 24 hour   Intake 480 ml   Output 600 ml   Net -120 ml     Weight (last 2 days)     Date/Time Weight    06/15/22 0915 77 1 (170)    06/15/22 0532 77 4 (170 64)    06/14/22 0600 82 4 (181 66)          LABORATORY RESULTS      CBC with diff: Results from last 7 days   Lab Units 06/13/22  1623 06/13/22  0813   WBC Thousand/uL 6 73 6 95   HEMOGLOBIN g/dL 12 1 12 7   HEMATOCRIT % 36 4 38 9   MCV fL 95 96   PLATELETS Thousands/uL 275 289   MCH pg 31 6 31 4   MCHC g/dL 33 2 32 6   RDW % 13 3 13 4   MPV fL 10 0 10 5   NRBC AUTO /100 WBCs 0 0       CMP:  Results from last 7 days   Lab Units 06/15/22  0659 06/14/22 2028 06/14/22  1228 06/14/22  0540 06/13/22  2347 06/13/22  1623 06/13/22  0813   POTASSIUM mmol/L 3 6 3 4* 3 4* 3 5 3 7 4 1 4 4   CHLORIDE mmol/L 94* 90* 91* 92* 91* 93* 94*   CO2 mmol/L 30 28 27 28 27 25 27   BUN mg/dL 24 24 20 20 20 23 20   CREATININE mg/dL 1 07 1 19 1 10 1 02 1 14 1 34* 1 08   CALCIUM mg/dL 9 4 9 3 9 2 9 1 9 3 9 1 9 5   AST U/L 41*  --   --   --   --  38 51*   ALT U/L 51  --   --   --   --  50 70   ALK PHOS U/L 63  --   --   --   --  68 76   EGFR ml/min/1 73sq m 50 44 48 53 46 38 49       BMP:  Results from last 7 days   Lab Units 06/15/22  0659 06/14/22 2028 06/14/22  1228 06/14/22  0540 06/13/22  2347 06/13/22  1623 06/13/22  0813   POTASSIUM mmol/L 3 6 3 4* 3 4* 3 5 3 7 4 1 4 4   CHLORIDE mmol/L 94* 90* 91* 92* 91* 93* 94*   CO2 mmol/L 30 28 27 28 27 25 27   BUN mg/dL 24 24 20 20 20 23 20   CREATININE mg/dL 1 07 1 19 1 10 1 02 1 14 1 34* 1 08   CALCIUM mg/dL 9 4 9 3 9 2 9 1 9 3 9 1 9 5       Lab Results   Component Value Date    NTBNP 761 (H) 01/08/2021    NTBNP 2,773 (H) 01/03/2021    NTBNP 506 (H) 03/25/2019        Results from last 7 days   Lab Units 06/13/22  1623   MAGNESIUM mg/dL 1 7*             Results from last 7 days   Lab Units 06/13/22  1623   TSH 3RD GENERATON uIU/mL 2 006             Lipid Profile:   Lab Results   Component Value Date    CHOL 205 05/06/2015    CHOL 195 07/10/2014     Lab Results   Component Value Date    HDL 66 04/01/2021    HDL 54 08/03/2020    HDL 57 11/25/2019     Lab Results   Component Value Date    LDLCALC 88 04/01/2021    LDLCALC 104 (H) 08/03/2020    LDLCALC 106 (H) 11/25/2019     Lab Results   Component Value Date    TRIG 112 2021    TRIG 121 2020    TRIG 106 2019       Cardiac testing:   Results for orders placed during the hospital encounter of 21    Echo complete with contrast if indicated    Narrative  [facility] [address] [facility]  [phone]    Transthoracic Echocardiogram  2D, M-mode, Doppler, and Color Doppler    Study date:  2021    Patient: Aster Garcia  MR number: [id number]  Account number: [de-identified]  : 1945  Age: 67 years  Gender: Female  Status: Inpatient  Location: Bedside  Height: 62 in  Weight: 151 6 lb  BP: 126/ 94 mmHg    Indications: Atrial fibrillation    Diagnoses: I48 0 - Atrial fibrillation    Sonographer:  IGNACIO Barrett  Primary Physician:  Robe Freire MD  Referring Physician:  Anastacio Osei MD  Group:  [facility]  Interpreting Physician:  Mireya Boyd MD    SUMMARY    LEFT VENTRICLE:  Systolic function was normal  Ejection fraction was estimated to be 55 %  There were no regional wall motion abnormalities  Wall thickness was at the upper limits of normal   Doppler parameters were consistent with elevated ventricular end-diastolic filling pressure  LEFT ATRIUM:  The atrium was mildly to moderately dilated  RIGHT ATRIUM:  The atrium was mildly dilated  MITRAL VALVE:  There was mild stenosis  TRICUSPID VALVE:  There was mild to moderate regurgitation  Pulmonary artery systolic pressure was mildly increased  HISTORY: PRIOR HISTORY: HLD, HTN, PAF, chronic CHF, thyroid nodule    PROCEDURE: The procedure was performed at the bedside  This was a routine study  The transthoracic approach was used  The study included complete 2D imaging, M-mode, complete spectral Doppler, and color Doppler  The heart rate was 101 bpm,  at the start of the study   Images were obtained from the parasternal, apical, subcostal, and suprasternal notch acoustic windows  Echocardiographic views were limited due to lung interference  This was a technically difficult study  LEFT VENTRICLE: Size was normal  Systolic function was normal  Ejection fraction was estimated to be 55 %  There were no regional wall motion abnormalities  Wall thickness was at the upper limits of normal  DOPPLER: Transmitral flow  pattern: atrial fibrillation  Left ventricular diastolic function parameters were abnormal  Doppler parameters were consistent with elevated ventricular end-diastolic filling pressure  RIGHT VENTRICLE: The size was normal  Systolic function was normal  Wall thickness was normal     LEFT ATRIUM: The atrium was mildly to moderately dilated  RIGHT ATRIUM: The atrium was mildly dilated  MITRAL VALVE: Valve structure was normal  There was normal leaflet separation  DOPPLER: The transmitral velocity was within the normal range  There was mild stenosis  There was no significant regurgitation  AORTIC VALVE: The valve was trileaflet  Leaflets exhibited normal thickness and normal cuspal separation  DOPPLER: Transaortic velocity was within the normal range  There was no evidence for stenosis  There was no significant  regurgitation  TRICUSPID VALVE: The valve structure was normal  There was normal leaflet separation  DOPPLER: The transtricuspid velocity was within the normal range  There was no evidence for stenosis  There was mild to moderate regurgitation  Pulmonary  artery systolic pressure was mildly increased  PULMONIC VALVE: Leaflets exhibited normal thickness, no calcification, and normal cuspal separation  DOPPLER: The transpulmonic velocity was within the normal range  There was no significant regurgitation  PERICARDIUM: There was no pericardial effusion  The pericardium was normal in appearance  AORTA: The root exhibited normal size  SYSTEMIC VEINS: IVC: The inferior vena cava was normal in size      PULMONARY VEINS: DOPPLER: Doppler signals were not recordable in the pulmonary vein(s)  SYSTEM MEASUREMENT TABLES    2D  %FS: 37 15 %  Ao Diam: 2 74 cm  Ao asc: 2 72 cm  EDV(Teich): 103 17 ml  EF(Teich): 67 06 %  ESV(Teich): 33 98 ml  IVSd: 0 94 cm  LA Area: 13 2 cm2  LA Diam: 3 98 cm  LVEDV MOD A4C: 32 01 ml  LVEF MOD A4C: 63 74 %  LVESV MOD A4C: 11 61 ml  LVIDd: 4 72 cm  LVIDs: 2 96 cm  LVLd A4C: 5 9 cm  LVLs A4C: 4 79 cm  LVPWd: 0 93 cm  RA Area: 8 67 cm2  RVIDd: 3 35 cm  SV MOD A4C: 20 4 ml  SV(Teich): 69 19 ml    CW  TR MaxP 39 mmHg  TR Vmax: 2 89 m/s    MM  TAPSE: 1 51 cm    [facility]    Prepared and electronically signed by    Aicha Sierra MD  Signed 2021 11:05:34    No results found for this or any previous visit  No results found for this or any previous visit  No valid procedures specified  No results found for this or any previous visit        Meds/Allergies   all current active meds have been reviewed and current meds:   Current Facility-Administered Medications   Medication Dose Route Frequency    acetaminophen (TYLENOL) tablet 650 mg  650 mg Oral Q4H PRN    amiodarone tablet 200 mg  200 mg Oral Daily    apixaban (ELIQUIS) tablet 5 mg  5 mg Oral BID    ascorbic acid (VITAMIN C) tablet 500 mg  500 mg Oral Daily    cholecalciferol (VITAMIN D3) tablet 400 Units  400 Units Oral Daily    DULoxetine (CYMBALTA) delayed release capsule 30 mg  30 mg Oral Daily    ezetimibe (ZETIA) tablet 10 mg  10 mg Oral Daily    famotidine (PEPCID) tablet 20 mg  20 mg Oral Every Other Day    fluticasone (FLONASE) 50 mcg/act nasal spray 2 spray  2 spray Each Nare Daily    furosemide (LASIX) injection 40 mg  40 mg Intravenous BID (diuretic)    gabapentin (NEURONTIN) capsule 100 mg  100 mg Oral BID    lidocaine (LIDODERM) 5 % patch 1 patch  1 patch Topical Daily    metoprolol succinate (TOPROL-XL) 24 hr tablet 125 mg  125 mg Oral Q12H [address]    potassium chloride (K-DUR,KLOR-CON) CR tablet 20 mEq  20 mEq Oral BID    rOPINIRole (REQUIP) tablet 0 5 mg  0 5 mg Oral HS    zolpidem (AMBIEN) tablet 5 mg  5 mg Oral HS          EKG personally reviewed by     Assessment:  Principal Problem:    Acute heart failure (HCC)  Active Problems:    Essential hypertension    Allergic rhinitis    Fibromyalgia    Hyperlipidemia    Insomnia    Paroxysmal atrial fibrillation (HCC)    Meningioma (HCC)    Hypomagnesemia    Hyponatremia    Hyperbilirubinemia    Elevated serum creatinine    Confusion    Imbalance    Counseling / Coordination of Care  Total floor / unit time spent today 20 minutes  Greater than 50% of total time was spent with the patient and / or family counseling and / or coordination of care  ** Please Note: Dragon 360 Dictation voice to text software may have been used in the creation of this document   **

## 2022-06-15 NOTE — PROGRESS NOTES
[facility]  Progress Note - Amena Guardado 1945, 67 y o  female MRN: [id number]  Unit/Bed#: S -43 Encounter: [id number]  Primary Care Provider: Edmundo Clark MD   Date and time admitted to hospital: 6/13/2022  4:27 PM     * Acute heart failure [facility])  Assessment & Plan  Wt Readings from Last 3 Encounters:   06/15/22 73 8 kg (162 lb 12 8 oz)   06/07/22 78 kg (172 lb)   05/16/22 76 7 kg (169 lb)     · POA: worsening SOB since 1-1 5 weeks, conversational dyspnea  Exam shows +1 pitting edema in b/l lower extremities, L>R  No JVD  Lungs CTA b/l  · BNP: 928   · CXR: CP angle blunting, pleural effusions on my read  · Last echo 5/16/21: EF 45%, Global hypokinesis, LA, RA dilated, TR (moderate)  · S/p IV lasix 20 mg in ED x1--> noted jump in Cr from 1 08-->1 34  · On 06/15 a m , creatinine stable status post IV diuresis    Etiology:  Acute heart failure likely secondary to uncontrolled atrial fibrillation    Plan:  · IV Lasix 40 mg BID; suboptimal diuresis noted on 06/15 a m  · Repeat TTE to assess for LVEF function  · transition to p o   Diuretics as per cardiology team recommendations  · Encourage PO intake  · Strict I/Os  · Daily weights      Hyponatremia  Assessment & Plan  · POA Na: 125--> repeat 128 in setting of signs of volume overload w/ BNP of 928; reported confusion (as noticed by daughter), imbalance (since past few months) at home  · S/p 1 dose IV Lasix 20 mg in ED  · Na 128 on 06/14 a m -->129 on 06/14 afternoon--> 135 on 6/15 am   · Resolved as of 06/15/2022    Plan:   · Encourage PO intake  · BMP q12h-next on 06/15 evening  · Hold losartan for now may consider resuming on 06/16 given sodium is within normal limits  · Monitor Cr    Paroxysmal atrial fibrillation (HCC)  Assessment & Plan  · Was supposed to have an ablation at [facility] on 06/14  · EKG shows rate controlled Afib   · Overnight on 06/14-6/15- AFib with RVR; had to be given Lopressor 5 mg IV x1    Plan: · Toprol increased to 125 mg b i d , amiodarone 200 mg daily, Eliquis 5 mg b i d  · Low threshold to discontinue amiodarone as it may be contributing to patient's gait imbalance  · Monitor on telemetry  · Will inform family to reschedule ablation as per Cardiology team's recommendations    Hyperbilirubinemia  Assessment & Plan  Lab Results   Component Value Date    TBILI 1 85 (H) 06/15/2022    TBILI 1 66 (H) 06/13/2022    TBILI 1 79 (H) 06/13/2022     Elevated direct bilirubin noted (0 38)  Likely in the setting of acute heart failure exacerbation    · Benign abdominal exam  · Monitor via CMP      Meningioma [facility])  Assessment & Plan  · MRI brain in 4/22 demonstrated meningioma  · Follows w/ Neurosurgery outpatient  · Unlikely to be causing syx of imbalance, confusion  · Non focal neuro exam- hold off on [facility] for now  · Obtain CTH if pt has new deficits overnight  Imbalance  Assessment & Plan  · Patient complained of gait imbalance is the past few weeks prior to presentation in setting of meningioma detected on brain imaging in April 2022  · Etiology may be multifactorial including hyponatremia (Na 125 on presentation), amiodarone (recent change in cardiac medications in February 2022), meningioma  · PT/OT eval    Confusion  Assessment & Plan  · Etiology:  Possible metabolic encephalopathy due to hyponatremia, as evidenced by altered mental status with confusion in a patient with hyponatremia, requiring treatment of hyponatremia and serial mental status exams  · Overnight on 06/1-6/15:  Patient became agitated, had to be given Seroquel, Zyprexa and put on restraints      Plan:  · Delirium precautions  · Avoid sedating medications  · Restarted home medications including:  Cymbalta, gabapentin, zolpidem    Elevated serum creatinine  Assessment & Plan  · Noted s/p Lasix IV 20 mg X1 in the ED  · In setting of CKD    Plan:   · Monitor Cr level in setting of IV diuresis  · Encourage PO intake    Hypomagnesemia  Assessment & Plan  · Mag 1 7   · Repleted 2g Mag Sulfate IV on     Plan:  · Monitor for electrolyte abnormalities and replete as appropriate      Insomnia  Assessment & Plan  · Restarted Zolpidem on 06/15    Hyperlipidemia  Assessment & Plan  · Continue Zetia 10 mg QD      Fibromyalgia  Assessment & Plan  · On gabapentin 300 mg x2 QHS  · Restarted gabapentin on 06/15    Allergic rhinitis  Assessment & Plan  · Fluticasone 2 sprays QD    Essential hypertension  Assessment & Plan  · Home meds: Toprol 100 mg QD, Cozaar 75 mg QD    Plan:   · Hold Cozaar in setting of rising Cr and aggressive IV diuresis  · Toprol 125 mg b i d  (increased to previous home dosage as per Cardiology recommendations)  · Monitor vitals      VTE Pharmacologic Prophylaxis: VTE Score: 5 High Risk (Score >/= 5) - Pharmacological DVT Prophylaxis Ordered: apixaban (Eliquis)  Sequential Compression Devices Ordered  Patient Centered Rounds: I performed bedside rounds with nursing staff today  Discussions with Specialists or Other Care Team Provider: Cardiology    Education and Discussions with Family / Patient: Updated  (daughter) via phone  Current Length of Stay: 2 day(s)  Current Patient Status: Inpatient   Discharge Plan: Anticipate discharge in 24-48 hrs to home  Code Status: Level 1 - Full Code    Subjective:   Patient seen and examined at bedside  Overnight, patient was aggressive, agitated and had to be given Seroquel, Zyprexa and put on restraints  She does not currently endorse any chest pain, abdominal pain, palpitations, worsening shortness of breath, urinary difficulty  She tells me she would like her daughter to be updated and I informed her that internal medicine team would update her daughter      Objective:     Vitals:   Temp (24hrs), Av 4 °F (36 9 °C), Min:98 2 °F (36 8 °C), Max:98 6 °F (37 °C)    Temp:  [98 2 °F (36 8 °C)-98 6 °F (37 °C)] 98 2 °F (36 8 °C)  HR:  [] 109  Resp:  [15-18] 15  BP: (118-165)/(77-95) 142/82  SpO2:  [93 %-98 %] 93 %  Body mass index is 29 78 kg/m²  Input and Output Summary (last 24 hours): Intake/Output Summary (Last 24 hours) at 6/15/2022 1227  Last data filed at 6/14/2022 1306  Gross per 24 hour   Intake 480 ml   Output 200 ml   Net 280 ml     Physical Exam  Vitals reviewed  General Examination:  Sitting in chair, cooperative   HEENT: Normocephalic, Atraumatic  Extraocular movements intact, PERRLA  CVS: S1, S2 noted  Tachycardic  No JVD  Irregularly irregular rhythm  Lungs:  Normal effort  No conversational dyspnea  Abdomen: Soft, normal bowel sounds  Non distended, non tender  Ext:  Trace pitting edema bilaterally (improved since yesterday)  Psych: Though Process - logical    Skin: No bleeding/bruising noted  Neuro: A, Ox3  Follows simple, 3 steps commands  Appropriate antigravity strength in all 4 extremities proximally, distally        Additional Data:     Labs:  Results from last 7 days   Lab Units 06/13/22  1623   WBC Thousand/uL 6 73   HEMOGLOBIN g/dL 12 1   HEMATOCRIT % 36 4   PLATELETS Thousands/uL 275   NEUTROS PCT % 64   LYMPHS PCT % 21   MONOS PCT % 11   EOS PCT % 3     Results from last 7 days   Lab Units 06/15/22  0659   SODIUM mmol/L 135   POTASSIUM mmol/L 3 6   CHLORIDE mmol/L 94*   CO2 mmol/L 30   BUN mg/dL 24   CREATININE mg/dL 1 07   ANION GAP mmol/L 11   CALCIUM mg/dL 9 4   ALBUMIN g/dL 3 7   TOTAL BILIRUBIN mg/dL 1 85*   ALK PHOS U/L 63   ALT U/L 51   AST U/L 41*   GLUCOSE RANDOM mg/dL 134                 Results from last 7 days   Lab Units 06/13/22  2131   PROCALCITONIN ng/ml 0 13       Lines/Drains:  Invasive Devices  Report    Peripheral Intravenous Line  Duration           Peripheral IV 06/15/22 Distal;Right;Ventral (anterior) Forearm <1 day                  Telemetry:  Telemetry Orders (From admission, onward)             24 Hour Telemetry Monitoring  Continuous x 24 Hours (Telem)        References: Telemetry Guidelines   Question:  Reason for 24 Hour Telemetry  Answer:  Metabolic/Electrolyte Disturbance with High Probability of Dysrhythmia (K level <3 or >6, or KCL infusion >=10mEq/hr)                 Telemetry Reviewed:  AFib, rate controlled  Indication for Continued Telemetry Use: Arrthymias requiring medical therapy             Imaging:  Reviewed imaging reports from this admission including:  XR chest 1 view portable   Final Result by Amparo Chambers MD (06/13 2124)      Small bilateral pleural effusions with extensive bibasilar atelectasis and/or infiltrates are seen, right greater than left                    Workstation performed: [id number]           Recent Cultures (last 7 days):         Last 24 Hours Medication List:   Current Facility-Administered Medications   Medication Dose Route Frequency Provider Last Rate    acetaminophen  650 mg Oral Q4H PRN [place] Chimera, DO      amiodarone  200 mg Oral Daily Angelique Sorenson MD      apixaban  5 mg Oral BID Angelique Sorenson MD      ascorbic acid  500 mg Oral Daily Angelique Sorenson MD      cholecalciferol  400 Units Oral Daily Angelique Sorenson MD      digoxin  250 mcg Intravenous Q6H Faythe Sin Kinnello, CRNP      DULoxetine  30 mg Oral Daily Angelique Sorenson MD      ezetimibe  10 mg Oral Daily Angelique Sorenson MD      famotidine  20 mg Oral Every Other Day Angelique Sorenson MD      fluticasone  2 spray Each Nare Daily Angelique Sorenson MD      furosemide  40 mg Intravenous BID (diuretic) JENNY Jackman      gabapentin  100 mg Oral BID Angelique Sorenson MD      lidocaine  1 patch Topical Daily Angelique Sorenson MD      losartan  50 mg Oral Daily Faythe Sin Spironello, CRNP      metoprolol succinate  125 mg Oral Q12H [address] Faythe Sin Spironello, CRNP      potassium chloride  20 mEq Oral BID Faythe Sin Spironello, CRNP      rOPINIRole  0 5 mg Oral HS Angelique Sorenson MD      zolpidem  5 mg Oral HS Angelique Sorenson MD          Today, Patient Was Seen By: Gloria Zuleta MD    **Please Note: This note may have been constructed using a voice recognition system  **

## 2022-06-15 NOTE — ASSESSMENT & PLAN NOTE
· Home meds: Toprol 100 mg QD, Cozaar 75 mg QD    Plan:   · Hold Cozaar in setting of rising Cr and aggressive IV diuresis  · Toprol 125 mg b i d  (increased to previous home dosage as per Cardiology recommendations)  · Monitor vitals

## 2022-06-15 NOTE — CASE MANAGEMENT
Case Management Assessment & Discharge Planning Note    Patient name Amena Baldwin S MS 18/S -82 MRN [id number]  : 1945 Date 6/15/2022       Current Admission Date: 2022  Current Admission Diagnosis:Acute heart failure [facility])   Patient Active Problem List    Diagnosis Date Noted    Imbalance 06/15/2022    Confusion 2022    Acute heart failure ([facility] ) 2022    Hypomagnesemia 2022    Hyponatremia 2022    Hyperbilirubinemia 2022    Elevated serum creatinine 2022    Continuous opioid dependence ([facility] ) 2022    Meningioma ([facility] ) 2022    Stage 3b chronic kidney disease ([facility] ) 2022    Chronic tension-type headache, not intractable 2022    Vasomotor rhinitis 2021    MGUS (monoclonal gammopathy of unknown significance) 2021    Hurthle cell adenoma of thyroid 2021    Tremors of nervous system 2021    Hx of diplopia 2021    S/P placement of cardiac pacemaker 2021    Current use of long term anticoagulation 2021    Malignant neoplasm of thyroid gland ([facility] ) 2021    Chronic diastolic CHF (congestive heart failure) ([facility] ) 2021    Chronic rhinitis 11/10/2020    Arthritis of both acromioclavicular joints 2020    Carpal tunnel syndrome on right 2019    Osteoarthritis of shoulder     TMJ syndrome 10/18/2017    Paroxysmal atrial fibrillation ([facility] ) 2016    Essential hypertension 2016    Peripheral neuropathy 2016    Lumbar spondylosis 2015    Lumbar stenosis 2015    Restless legs syndrome 2014    Allergic rhinitis 2013    Osteoarthritis of knee 2013    Insomnia 2013    Osteopenia 2012    Fibromyalgia 10/16/2012    Hyperlipidemia 10/16/2012    Osteoarthrosis, hand 10/16/2012    Vitamin D deficiency 10/16/2012      LOS (days): 2  Geometric Mean LOS (GMLOS) (days): 3 80  Days to GMLOS:1 9 OBJECTIVE:    Risk of Unplanned Readmission Score: 24 3         Current admission status: Inpatient       Preferred Pharmacy:   [facility] [address]  Phone: [phone] Fax: [phone]    Primary Care Provider: Kay Freire MD    Primary Insurance: MEDICARE  Secondary Insurance: AARP    ASSESSMENT:  Jovani Tyler Proxies    There are no active Health Care Proxies on file  Patient Information  Mental Status: Confused  During Assessment patient was accompanied by: Not accompanied during assessment  Assessment information provided by[de-identified] Daughter Pam Soto)  Primary Caregiver: Self  Support Systems: Self, Daughter  [place] of Residence: [address] [place] [address] do you live in?: [facility] entry access options   Select all that apply : Ramp (front entrance or 2 CHIN at the other entrance)  Type of Current Residence: [facility]  Living Arrangements: Lives Alone (Daughter lives next door)    Activities of Daily Living Prior to Admission  Functional Status: Independent  Completes ADLs independently?: Yes  Ambulates independently?: Yes  Does patient use assisted devices?: Yes  Assisted Devices (DME) used: Straight Cane  Does patient currently own DME?: Yes  What DME does the patient currently own?: Wheelchair, Dianne Sigala, [address], Shower Chair  Does patient have a history of Outpatient Therapy (PT/OT)?: No  Does the patient have a history of Short-Term Rehab?: No  Does patient have a history of HHC?: No  Does patient currently have [facility]?: No    Patient Information Continued  Income Source: Pension/[facility]  Does patient have prescription coverage?: Yes  Does patient receive dialysis treatments?: No  Does patient have a history of substance abuse?: No  Does patient have a history of Mental Health Diagnosis?: No    Means of Transportation  Means of Transport to Appts[de-identified] Drives Self  In the past 12 months, has lack of transportation kept you from medical appointments or from getting medications?: No  In the past 12 months, has lack of transportation kept you from meetings, work, or from getting things needed for daily living?: No  Was application for public transport provided?: N/A    DISCHARGE DETAILS:    Discharge planning discussed with[de-identified] daughter Mirela Westbrook Lieu of Choice: Yes  Comments - Freedom of Choice: STR    Contacts  Patient Contacts: Abena Thao  Relationship to Patient[de-identified] Family  Contact Method: Phone  Phone Number: [phone]  Reason/Outcome: Emergency Contact, Discharge [address]         Is the patient interested in [email or web address] [facility] at discharge?: No    DME Referral Provided  Referral made for DME?: No    Other Referral/Resources/Interventions Provided:  Interventions: Short Term Rehab  Referral Comments: Therapy recs for STR  CM spoke iwth pt daughter, Abena Thao, and she is agreeable to blanket referrals  Abena Thao reports she is pt POA and pt has a LW  CM requested copies when she visits to be uploaded to chart  Abena Thao aware CM will place referrals and f/u with options  As per Abena Thao she believes pt has first booster and will look for card      Discharge Destination Plan[de-identified] Short Term Rehab

## 2022-06-15 NOTE — ASSESSMENT & PLAN NOTE
· Was supposed to have an ablation at [facility] on 06/14  · EKG shows rate controlled Afib   · Overnight on 06/14-6/15- AFib with RVR; had to be given Lopressor 5 mg IV x1    Plan:   · Toprol increased to 125 mg b i d , amiodarone 200 mg daily, Eliquis 5 mg b i d    · Low threshold to discontinue amiodarone as it may be contributing to patient's gait imbalance  · Monitor on telemetry  · Will inform family to reschedule ablation as per Cardiology team's recommendations

## 2022-06-15 NOTE — ASSESSMENT & PLAN NOTE
Wt Readings from Last 3 Encounters:   06/15/22 73 8 kg (162 lb 12 8 oz)   06/07/22 78 kg (172 lb)   05/16/22 76 7 kg (169 lb)     · POA: worsening SOB since 1-1 5 weeks, conversational dyspnea  Exam shows +1 pitting edema in b/l lower extremities, L>R  No JVD  Lungs CTA b/l  · BNP: 928   · CXR: CP angle blunting, pleural effusions on my read  · Last echo 5/16/21: EF 45%, Global hypokinesis, LA, RA dilated, TR (moderate)  · Echo 6/15- LVEF 60%   · S/p IV lasix 20 mg in ED x1--> noted jump in Cr from 1 08-->1 34  · On 06/16 a m , creatinine stable status post IV diuresis    Etiology:  Acute heart failure likely secondary to uncontrolled atrial fibrillation    Plan:  · IV Lasix 40 mg BID  · transition to p o   Diuretics as per cardiology team recommendations  · Encourage PO intake  · Strict I/Os  · Daily weights

## 2022-06-15 NOTE — ASSESSMENT & PLAN NOTE
· Etiology:  Possible metabolic encephalopathy due to hyponatremia, as evidenced by altered mental status with confusion in a patient with hyponatremia, requiring treatment of hyponatremia and serial mental status exams  · Overnight on 06/1-6/15:  Patient became agitated, had to be given Seroquel, Zyprexa and put on restraints      Plan:  · Delirium precautions  · Avoid sedating medications  · Restarted home medications including:  Cymbalta, gabapentin, zolpidem

## 2022-06-15 NOTE — PHYSICAL THERAPY NOTE
PHYSICAL THERAPY EVALUATION NOTE    Patient Name: Garry Wiseman  [id number] Date: 6/15/2022  AGE:   67 y o  Mrn:   [id number]  ADMIT DX:  Shortness of breath [R06 02]  Acute heart failure (HCC) [I50 9]  Hyponatremia [E87 1]  Paroxysmal atrial fibrillation (HCC) [I48 0]  Pleural effusion [J90]  Abnormal laboratory test result [R89 9]    Past Medical History:   Diagnosis Date    Actinic keratosis     last assessed - [id number]    Arthritis     Atrial fibrillation with rapid ventricular response ([facility] [address] )     last assessed - 26Apr2016    Basal cell carcinoma     Benign colon polyp     last assessed - 27Apr2015    Disease of thyroid gland     Effusion of knee joint right     Resolved - 19Apr2016    Esophageal reflux     Fibromyalgia     last assessed - 27Dec2017    Fibromyalgia, primary     GERD (gastroesophageal reflux disease)     Hypertension     Palpitations     last assessed - 30Apr2013    Peroneal tendonitis, right     last assessed - [id number]    Right lumbar radiculopathy 3/17/2016    Thyroid cancer ([facility] )     Thyroid nodule     Trochanteric bursitis of left hip 3/9/2018     Length Of Stay: 2  PHYSICAL THERAPY EVALUATION :    06/15/22 1312   PT Last Visit   PT Visit Date 06/15/22   Pain Assessment   Pain Assessment Tool 0-10   Pain Score No Pain   Restrictions/Precautions   Other Precautions Impulsive;Cognitive; Chair Alarm; Bed Alarm;1:1;Fall Risk;Hard of hearing  ([place])   Home Living   Type of [address] [place] [address] One level; Other (Comment)  (2 [address])   Additional Comments lives alone  daughter lives next door and is supportive  independent w/ ADLs and driving  ambulates w/ cane  owns roller walker  4 to 5 falls in last 6 months  most of social history was provided by pt's daughter at bedside     General   Additional Pertinent History 6/15/22 at 5:32 heart rate was 122 BPM    Family/Caregiver Present Yes   Cognition   Overall Cognitive Status Impaired   Arousal/Participation Cooperative   Attention Attends with cues to redirect   Orientation Level Oriented to person; Other (Comment)  (pt was identified w/ full name, birth date)   Following Commands Follows one step commands with increased time or repetition   Comments blood pressure supine 132/72 and 83 BPM, seated 139/71 and 98 BPM, standing 123/60 and 99 BPM  room air resting pulse ox 96%, active 86%  Subjective   Subjective pt seen sitting in chair w/ family present  pt needed input for task focus  denied pain  pt states having intermittent lightheadedness  pt agreed to participate in PT eval    RUE Assessment   RUE Assessment WFL   LUE Assessment   LUE Assessment WFL   RLE Assessment   RLE Assessment WFL  (3+ to 4-/5)   LLE Assessment   LLE Assessment WFL  (3+ to 4-/5)   Coordination   Movements are Fluid and Coordinated 0   Coordination and Movement Description intention tremors UEs   Sensation X  (impaired hearing)   Finger to Nose & Finger to Finger  Impaired   Heel to [place] Intact   Light Touch   RLE Light Touch Grossly intact   LLE Light Touch Grossly intact   Transfers   Sit to Stand 4  Minimal assistance  (+ lightheaded)   Additional items Assist x 1; Increased time required; Impulsive;Verbal cues  (for LE positioning)   Stand to Sit 4  Minimal assistance   Additional items Assist x 1; Impulsive;Verbal cues  (for body positioning, controlled descent)   Ambulation/Elevation   Gait pattern Forward Flexion; Shuffling; Short stride; Excessively slow  (+ lightheaded)   Gait Assistance 3  Moderate assist  (w/ chair follow)   Additional items Assist x 1;Verbal cues; Tactile cues  (for cane positioning, safety)   Assistive Device [facility]   Distance 8 feet  (additional not possible due to fatigue, lightheadedness)   Stair Management Assistance Not tested  (due to limited ambulation tolerance, impulsivity, safety concern)   Balance   Static Sitting Good   Static Standing Poor  (w/ cane) Ambulatory Poor  (w/ cane)   Activity Tolerance   Activity Tolerance Patient limited by fatigue; Other (Comment)  (limited by lightheadedness)   Nurse Made Aware spoke to Amy Stark CM   Assessment   Prognosis Fair   Problem List Decreased strength;Decreased endurance; Impaired balance;Decreased mobility; Decreased coordination;Decreased safety awareness;Decreased cognition; Impaired tone   Assessment Pt presents with complaints of progressively worsening SOB, confusion, abnormal outpatient lab work  Dx: acute heart failure, hyponatremia, hyperbilirubinemia, meningioma, imbalance, and confusion  order placed for PT eval and tx  pt presents w/ comorbidities of a-fib, CHF, CKD, meningioma, arthritis, HTN, palpitations, lumbar radiculopathy, left TKR, and fibromyalgia and personal factors of advanced age, mobilizing w/ assistive device, stair(s) to enter home, limited home support and positive fall history  pt presents w/ weakness, decreased endurance, impaired cognition, impaired balance, gait deviations, impaired coordination, impaired tone, decreased safety awareness and fall risk  these impairments are evident in findings from physical examination (weakness, impaired coordination and impaired tone), mobility assessment (need for mod assist w/ all phases of mobility when usually mobilizing independently, tolerance to only 8 feet of ambulation and need for cueing for mobility technique), and Barthel Index: 55/100  pt needed input for task focus and mobility technique  pt is at risk for falls due to physical and safety awareness deficits  pt's clinical presentation is unstable/unpredictable (evident in tachycardia, need for assist w/ all phases of mobility when usually mobilizing independently, tolerance to only 8 feet of ambulation, lightheadedness impacting overall mobility status and need for input for task focus and mobility technique)   pt needs inpatient PT tx to improve mobility deficits and progress mobility training as appropriate  discharge recommendation is for inpatient rehab to reduce fall risk and maximize level of functional independence  pt would benefit from Gerontology consult to address cognition and aging related issues   Goals   Patient Goals live my life and do what i want to go  STG Expiration Date 06/25/22   Short Term Goal #1 pt will: Increase bilateral LE strength 1/2 grade to facilitate independent mobility, Perform bed mobility w/ supervision to increase level of independence, Perform all transfers w/ supervision to improve independence, Ambulate 150 ft  with least restrictive assistive device w/ supervision w/o LOB to improve functional independence, Navigate 2 stair(s) w/ minx1 with unilateral handrail to facilitate return to previous living environment, Increase all balance 1 grade to decrease risk for falls, Tolerate 3 hr OOB to faciliate upright tolerance, Improve gait speed to 0 53 m/s to reduce fall risk and increase independence and Improve Barthel Index score to 80 or greater to facilitate independence  PT Treatment Day 1   Plan   Treatment/Interventions Functional transfer training;LE strengthening/ROM; Elevations; Therapeutic exercise; Endurance training;Cognitive reorientation;Patient/family training;Equipment eval/education; Bed mobility;Gait training   PT Frequency 4-6x/wk   Recommendation   PT Discharge Recommendation Post acute rehabilitation services   Additional Comments (S)  pt would benefit from Gerontology consult to address cognition and aging related issues   Additional Comments 2 recommend roller walker use w/ mobility   AM-PAC Basic Mobility Inpatient   Turning in Bed Without Bedrails 3   Lying on Back to Sitting on Edge of Flat Bed 2   Moving Bed to Chair 2   Standing Up From Chair 2   Walk in Room 2   Climb 3-5 Stairs 1   Basic Mobility Inpatient Raw Score 12   Basic Mobility Standardized Score 32 23   Highest Level Of Mobility   [facility] Goal 4: Move to chair/commode   [facility] Achieved 7: Walk 25 feet or more   Barthel Index   Feeding 10   Bathing 0   Grooming Score 5   Dressing Score 5   Bladder Score 10   Bowels Score 10   Toilet Use Score 5   Transfers (Bed/Chair) Score 10   Mobility (Level Surface) Score 0   Stairs Score 0   Barthel Index Score 55   Additional Treatment Session   Start Time 1312   End Time 1322   Treatment Assessment Pt agreed to participate in PT intervention  Therapist introduced roller walker use w/ mobility to address impairments noted during evaluation  Sit <---> stand transfer w/ min to modx1  Ambulated 30 feet x2, 50 feet w/ minx1 and chair follow  seated rest break was required  Additional ambulation was not possible due to fatigue and lightheadedness  Comfortable Gait Speed: 0 43 m/s  Pt was noted to have improvement in mobility status w/ use of walker w/ decreased level of assistance and improved ambulation tolerance  Pt continues to require min to mod assist and verbal cues w/ mobility for proper technique/safety  Pt is at risk for falling  continued inpatient PT tx is indicated to reduce fall risk factors  Equipment Use roller walker   Additional Treatment Day 1   End of Consult   Patient Position at End of Consult Bedside chair;Bed/Chair alarm activated; All needs within reach  (1:1 present)     Comfortable Gait Speed: 0 43 m/s w/ roller walker  Gait Speed Interpretation:  Gain of 0 1 m/s is a predictor of well-being in those w/ abnormal walking speed compared to age-patched peers  <0 7m/s is at an increased risk for falls    Household ambulator: <0 4 m/s  Limited community ambulator: 0 4-0 8 m/s  [employer] ambulator: 0 8-1 2 m/s  Able to safely cross streets: >1 2 m/s    The patient's AM-PAC Basic Mobility Inpatient Short Form Raw Score is 12  A Raw score of less than or equal to 16 suggests the patient may benefit from discharge to post-acute rehabilitation services   Please also refer to the recommendation of the Physical Therapist for safe discharge planning  Skilled PT recommended while in hospital and upon DC to progress pt toward treatment goals       Boston Acosta, PT

## 2022-06-15 NOTE — ASSESSMENT & PLAN NOTE
· POA Na: 125--> repeat 128 in setting of signs of volume overload w/ BNP of 928; reported confusion (as noticed by daughter), imbalance (since past few months) at home  · S/p 1 dose IV Lasix 20 mg in ED  · Na 128 on 06/14 a m -->129 on 06/14 afternoon--> 135 on 6/15 am   · Resolved as of 06/15/2022    Plan:   · Encourage PO intake  · BMP q12h-next on 06/15 evening  · Hold losartan for now may consider resuming on 06/16 given sodium is within normal limits  · Monitor Cr

## 2022-06-15 NOTE — TELEPHONE ENCOUNTER
Per Mango Ritchie, mom is in hospital and still having hallucinations/confusion  Per nurse the sodium level is back 135  Mango Ritchie is asking if there any medication that you would recommend  Call back from daughter, they will keep her tonight and possibly place her in SNF

## 2022-06-15 NOTE — ASSESSMENT & PLAN NOTE
· Patient complained of gait imbalance is the past few weeks prior to presentation in setting of meningioma detected on brain imaging in April 2022  · Etiology may be multifactorial including hyponatremia (Na 125 on presentation), amiodarone (recent change in cardiac medications in February 2022), meningioma    · PT/OT eval

## 2022-06-15 NOTE — ASSESSMENT & PLAN NOTE
Patient was scheduled for tomorrow by another PSR. She states 9:30 works fine.    · POA Na: 125--> repeat 128 in setting of signs of volume overload w/ BNP of 928; reported confusion (as noticed by daughter), imbalance (since past few months) at home  · S/p 1 dose IV Lasix 20 mg in ED  · Na 128 on 06/14 a m -->129 on 06/14 afternoon--> 135 on 6/15 am --> 136 on 06/16  · Resolved as of 06/16/2022    Plan:   · Encourage PO intake  · BMP daily  · Consider resuming losartan given sodium is within normal limits  · Monitor Cr

## 2022-06-15 NOTE — PLAN OF CARE
Problem: CARDIOVASCULAR - ADULT  Goal: Absence of cardiac dysrhythmias or at baseline rhythm  Description: INTERVENTIONS:  - Continuous cardiac monitoring, vital signs, obtain 12 lead EKG if ordered  - Administer antiarrhythmic and heart rate control medications as ordered  - Monitor electrolytes and administer replacement therapy as ordered  Outcome: Progressing     Problem: RESPIRATORY - ADULT  Goal: Achieves optimal ventilation and oxygenation  Description: INTERVENTIONS:  - Assess for changes in respiratory status  - Assess for changes in mentation and behavior  - Position to facilitate oxygenation and minimize respiratory effort  - Oxygen administered by appropriate delivery if ordered  - Initiate smoking cessation education as indicated  - Encourage broncho-pulmonary hygiene including cough, deep breathe, Incentive Spirometry  - Assess the need for suctioning and aspirate as needed  - Assess and instruct to report SOB or any respiratory difficulty  - Respiratory Therapy support as indicated  Outcome: Progressing     Problem: METABOLIC, FLUID AND ELECTROLYTES - ADULT  Goal: Electrolytes maintained within normal limits  Description: INTERVENTIONS:  - Monitor labs and assess patient for signs and symptoms of electrolyte imbalances  - Administer electrolyte replacement as ordered  - Monitor response to electrolyte replacements, including repeat lab results as appropriate  - Instruct patient on fluid and nutrition as appropriate  Outcome: Progressing     Problem: DISCHARGE PLANNING  Goal: Discharge to home or other facility with appropriate resources  Description: INTERVENTIONS:  - Identify barriers to discharge w/patient and caregiver  - Arrange for needed discharge resources and transportation as appropriate  - Identify discharge learning needs (meds, wound care, etc )  - Arrange for interpretive services to assist at discharge as needed  - Refer to Case Management Department for coordinating discharge planning if the patient needs post-hospital services based on physician/advanced practitioner order or complex needs related to functional status, cognitive ability, or social support system  Outcome: Progressing     Problem: Knowledge Deficit  Goal: Patient/family/caregiver demonstrates understanding of disease process, treatment plan, medications, and discharge instructions  Description: Complete learning assessment and assess knowledge base    Interventions:  - Provide teaching at level of understanding  - Provide teaching via preferred learning methods  Outcome: Progressing     Problem: SAFETY,RESTRAINT: NV/NON-SELF DESTRUCTIVE BEHAVIOR  Goal: Remains free of harm/injury (restraint for non violent/non self-detsructive behavior)  Description: INTERVENTIONS:  - Instruct patient/family regarding restraint use   - Assess and monitor physiologic and psychological status   - Provide interventions and comfort measures to meet assessed patient needs   - Identify and implement measures to help patient regain control  - Assess readiness for release of restraint   Outcome: Progressing     Problem: SAFETY,RESTRAINT: NV/NON-SELF DESTRUCTIVE BEHAVIOR  Goal: Returns to optimal restraint-free functioning  Description: INTERVENTIONS:  - Assess the patient's behavior and symptoms that indicate continued need for restraint  - Identify and implement measures to help patient regain control  - Assess readiness for release of restraint   Outcome: Progressing

## 2022-06-15 NOTE — TELEPHONE ENCOUNTER
This is an [address] with daughter, informed her that you were out of town until Tuesday  She is going to talk to doctor taking care of her at the hospital   Daughter is going to suggest another ct scan maybe the "tumor"has changed and might account for the sudden hallucinations  She is also going to ask them to check her urine for possible UTI  Will keep you posted

## 2022-06-15 NOTE — ASSESSMENT & PLAN NOTE
· Was supposed to have an ablation at [facility] on 06/14  · EKG shows rate controlled Afib   · Overnight on 06/14-6/15- AFib with RVR; had to be given Lopressor 5 mg IV x1    Plan:   · Toprol increased to 125 mg b i d , amiodarone 200 mg daily, Eliquis 5 mg b i d    · Digoxin:  250 mcg q 6h  · Low threshold to discontinue amiodarone as it may be contributing to patient's gait imbalance  · Monitor on telemetry  · Will inform family to reschedule ablation as per Cardiology team's recommendations

## 2022-06-16 LAB
ANION GAP SERPL CALCULATED.3IONS-SCNC: 10 MMOL/L (ref 4–13)
BACTERIA UR QL AUTO: NORMAL /HPF
BASOPHILS # BLD AUTO: 0.08 THOUSANDS/ΜL (ref 0–0.1)
BASOPHILS NFR BLD AUTO: 1 % (ref 0–1)
BILIRUB UR QL STRIP: NEGATIVE
BUN SERPL-MCNC: 25 MG/DL (ref 5–25)
CALCIUM SERPL-MCNC: 9.7 MG/DL (ref 8.4–10.2)
CHLORIDE SERPL-SCNC: 96 MMOL/L (ref 96–108)
CLARITY UR: CLEAR
CO2 SERPL-SCNC: 30 MMOL/L (ref 21–32)
COLOR UR: YELLOW
CREAT SERPL-MCNC: 1.18 MG/DL (ref 0.6–1.3)
EOSINOPHIL # BLD AUTO: 0.41 THOUSAND/ΜL (ref 0–0.61)
EOSINOPHIL NFR BLD AUTO: 6 % (ref 0–6)
ERYTHROCYTE [DISTWIDTH] IN BLOOD BY AUTOMATED COUNT: 13.5 % (ref 11.6–15.1)
GFR SERPL CREATININE-BSD FRML MDRD: 44 ML/MIN/1.73SQ M
GLUCOSE SERPL-MCNC: 114 MG/DL (ref 65–140)
GLUCOSE UR STRIP-MCNC: NEGATIVE MG/DL
HCT VFR BLD AUTO: 43.9 % (ref 34.8–46.1)
HGB BLD-MCNC: 14.3 G/DL (ref 11.5–15.4)
HGB UR QL STRIP.AUTO: NEGATIVE
IMM GRANULOCYTES # BLD AUTO: 0.03 THOUSAND/UL (ref 0–0.2)
IMM GRANULOCYTES NFR BLD AUTO: 0 % (ref 0–2)
KETONES UR STRIP-MCNC: NEGATIVE MG/DL
LEUKOCYTE ESTERASE UR QL STRIP: ABNORMAL
LYMPHOCYTES # BLD AUTO: 1.33 THOUSANDS/ΜL (ref 0.6–4.47)
LYMPHOCYTES NFR BLD AUTO: 18 % (ref 14–44)
MCH RBC QN AUTO: 31.2 PG (ref 26.8–34.3)
MCHC RBC AUTO-ENTMCNC: 32.6 G/DL (ref 31.4–37.4)
MCV RBC AUTO: 96 FL (ref 82–98)
MONOCYTES # BLD AUTO: 0.96 THOUSAND/ΜL (ref 0.17–1.22)
MONOCYTES NFR BLD AUTO: 13 % (ref 4–12)
NEUTROPHILS # BLD AUTO: 4.56 THOUSANDS/ΜL (ref 1.85–7.62)
NEUTS SEG NFR BLD AUTO: 62 % (ref 43–75)
NITRITE UR QL STRIP: NEGATIVE
NON-SQ EPI CELLS URNS QL MICRO: NORMAL /HPF
NRBC BLD AUTO-RTO: 0 /100 WBCS
PH UR STRIP.AUTO: 6.5 [PH]
PLATELET # BLD AUTO: 306 THOUSANDS/UL (ref 149–390)
PMV BLD AUTO: 9.9 FL (ref 8.9–12.7)
POTASSIUM SERPL-SCNC: 3.7 MMOL/L (ref 3.5–5.3)
PROT UR STRIP-MCNC: NEGATIVE MG/DL
RBC # BLD AUTO: 4.59 MILLION/UL (ref 3.81–5.12)
RBC #/AREA URNS AUTO: NORMAL /HPF
SODIUM SERPL-SCNC: 136 MMOL/L (ref 135–147)
SP GR UR STRIP.AUTO: 1.01 (ref 1–1.03)
UROBILINOGEN UR QL STRIP.AUTO: 0.2 E.U./DL
WBC # BLD AUTO: 7.37 THOUSAND/UL (ref 4.31–10.16)
WBC #/AREA URNS AUTO: NORMAL /HPF

## 2022-06-16 PROCEDURE — 85025 COMPLETE CBC W/AUTO DIFF WBC: CPT

## 2022-06-16 PROCEDURE — 97129 THER IVNTJ 1ST 15 MIN: CPT

## 2022-06-16 PROCEDURE — 80048 BASIC METABOLIC PNL TOTAL CA: CPT

## 2022-06-16 PROCEDURE — 99233 SBSQ HOSP IP/OBS HIGH 50: CPT | Performed by: INTERNAL MEDICINE

## 2022-06-16 PROCEDURE — 97535 SELF CARE MNGMENT TRAINING: CPT

## 2022-06-16 PROCEDURE — 81001 URINALYSIS AUTO W/SCOPE: CPT

## 2022-06-16 PROCEDURE — 99232 SBSQ HOSP IP/OBS MODERATE 35: CPT | Performed by: INTERNAL MEDICINE

## 2022-06-16 RX ORDER — GUAIFENESIN 600 MG
600 TABLET, EXTENDED RELEASE 12 HR ORAL DAILY PRN
Status: DISCONTINUED | OUTPATIENT
Start: 2022-06-16 | End: 2022-06-17 | Stop reason: HOSPADM

## 2022-06-16 RX ORDER — ZOLPIDEM TARTRATE 5 MG/1
10 TABLET ORAL
Status: DISCONTINUED | OUTPATIENT
Start: 2022-06-16 | End: 2022-06-17 | Stop reason: HOSPADM

## 2022-06-16 RX ORDER — GABAPENTIN 100 MG/1
100 CAPSULE ORAL
Status: DISCONTINUED | OUTPATIENT
Start: 2022-06-16 | End: 2022-06-17 | Stop reason: HOSPADM

## 2022-06-16 RX ORDER — DIGOXIN 125 MCG
125 TABLET ORAL DAILY
Status: DISCONTINUED | OUTPATIENT
Start: 2022-06-17 | End: 2022-06-17 | Stop reason: HOSPADM

## 2022-06-16 RX ORDER — LORATADINE 10 MG/1
10 TABLET ORAL DAILY
Status: DISCONTINUED | OUTPATIENT
Start: 2022-06-16 | End: 2022-06-17 | Stop reason: HOSPADM

## 2022-06-16 RX ADMIN — GUAIFENESIN 600 MG: 600 TABLET ORAL at 18:47

## 2022-06-16 RX ADMIN — LORATADINE 10 MG: 10 TABLET ORAL at 15:16

## 2022-06-16 RX ADMIN — GABAPENTIN 100 MG: 100 CAPSULE ORAL at 08:41

## 2022-06-16 RX ADMIN — ROPINIROLE 0.5 MG: 0.25 TABLET, FILM COATED ORAL at 21:32

## 2022-06-16 RX ADMIN — APIXABAN 5 MG: 5 TABLET, FILM COATED ORAL at 08:41

## 2022-06-16 RX ADMIN — DIGOXIN 250 MCG: 0.25 INJECTION INTRAMUSCULAR; INTRAVENOUS at 05:13

## 2022-06-16 RX ADMIN — LOSARTAN POTASSIUM 50 MG: 50 TABLET, FILM COATED ORAL at 08:41

## 2022-06-16 RX ADMIN — POTASSIUM CHLORIDE 20 MEQ: 1500 TABLET, EXTENDED RELEASE ORAL at 08:41

## 2022-06-16 RX ADMIN — APIXABAN 5 MG: 5 TABLET, FILM COATED ORAL at 18:39

## 2022-06-16 RX ADMIN — ZOLPIDEM TARTRATE 10 MG: 5 TABLET ORAL at 21:30

## 2022-06-16 RX ADMIN — FUROSEMIDE 40 MG: 10 INJECTION, SOLUTION INTRAMUSCULAR; INTRAVENOUS at 08:41

## 2022-06-16 RX ADMIN — AMIODARONE HYDROCHLORIDE 200 MG: 200 TABLET ORAL at 08:41

## 2022-06-16 RX ADMIN — METOPROLOL SUCCINATE 125 MG: 100 TABLET, EXTENDED RELEASE ORAL at 08:41

## 2022-06-16 RX ADMIN — GABAPENTIN 100 MG: 100 CAPSULE ORAL at 21:32

## 2022-06-16 RX ADMIN — DULOXETINE HYDROCHLORIDE 30 MG: 30 CAPSULE, DELAYED RELEASE ORAL at 08:41

## 2022-06-16 RX ADMIN — CHOLECALCIFEROL TAB 10 MCG (400 UNIT) 400 UNITS: 10 TAB at 08:41

## 2022-06-16 RX ADMIN — OXYCODONE HYDROCHLORIDE AND ACETAMINOPHEN 500 MG: 500 TABLET ORAL at 08:42

## 2022-06-16 RX ADMIN — POTASSIUM CHLORIDE 20 MEQ: 1500 TABLET, EXTENDED RELEASE ORAL at 18:39

## 2022-06-16 RX ADMIN — EZETIMIBE 10 MG: 10 TABLET ORAL at 18:39

## 2022-06-16 RX ADMIN — METOPROLOL SUCCINATE 125 MG: 100 TABLET, EXTENDED RELEASE ORAL at 21:32

## 2022-06-16 NOTE — PROGRESS NOTES
General Cardiology   Progress Note -  [facility]   Erica Mcgovern 67 y o  female MRN: [id number]    Unit/Bed#: S -01 Encounter: [id number]    Assessment/ Plan:    1  Acute on chronic HFpEF - likely provoked in the setting of # 2  Echo this admission with LVEF 60%; previously 45% 5/2022  Not on diuretics prior to admission; has been getting Lasix 40mg IV BID  - I/os likely inaccurate overall neg 30ml  - Wt 160lbs today; fown 3 lbs overnight  On exam today she is well compensated; LS are clear and no significant LE edema; she was able to ambulate to BR without any SOB  HRs are also improving  Will stop IV lasix; got dose of 40mg this AM; start oral diuretics tomorrow      [facility]  Recurrent/symptomatic persistent atrial fibrillation with RVR  "-Complex AFib history  -S/p prior AFib ablation with PVI performed w/ cryo balloon (2/2021) - developed symptomatic bradycardia and underwent subsequent MDT DC ppm implant (3/2021)     -Follows w/ Dr Sean David with [facility] and now Brenda Willard for repeat ablation procedure arranged as an outpatient at [facility] "    HRs were not well controlled yesterday and she was started on IV digoxin load; got last dose this AM; HRs seem to be improving; she was in 80s when I saw her with a few episode of elevated rates likely with exertion  Would start oral digoxin tomorrow; continue amiodarone 200mg and metoprolol succinate 125mg BID as well as eliquis  For [facility]  Follow up at [facility] upon discharge    3  MDT PPM in situ: occasionally vpacing  4  Hyponatremia  Resolved; Na 136 today  5  Dyslipidemia  On Zetia 10 mg daily  6  Hypertension  Average /75, last recorded 165/93   7  Thyroid nodule s/p left thyroid lobectomy (12/16/2020)  TSH 2 0    Subjective: Pt seen for follow up; no events overnight  She reports feeling somewhat better today  She did have some mild palpitations after walking back from BR but otherwise none  No SOB with exertion today   Still tired and a bit confused  No longer requiring restraints    Review of Systems   Constitutional: Positive for malaise/fatigue  Negative for decreased appetite  Cardiovascular: Positive for palpitations  Negative for chest pain, dyspnea on exertion and leg swelling  Respiratory: Negative for cough and shortness of breath  Gastrointestinal: Negative for nausea and vomiting  Genitourinary: Negative for dysuria  Neurological: Negative for dizziness and light-headedness  All other systems reviewed and are negative  Objective:   Vitals: Blood pressure 140/90, pulse 94, temperature 97 7 °F (36 5 °C), resp  rate 16, height 5' 2" (1 575 m), weight 72 9 kg (160 lb 11 5 oz), last menstrual period 02/01/1990, SpO2 95 %, not currently breastfeeding ,     Body mass index is 29 4 kg/m²  ,     Systolic (82EEU), FCR:925 , Min:101 , PIY:241     Diastolic (82LHG), YRE:47, Min:61, Max:90      Intake/Output Summary (Last 24 hours) at 6/16/2022 1017  Last data filed at 6/16/2022 0901  Gross per 24 hour   Intake 360 ml   Output 250 ml   Net 110 ml     Weight (last 2 days)     Date/Time Weight    06/16/22 0600 72 9 (160 72)    06/15/22 1055 73 8 (162 8)    06/15/22 0915 77 1 (170)    06/15/22 0532 77 4 (170 64)    06/14/22 0600 82 4 (181 66)        Telemetry Review:   Atrial fibrillation; rates 90s for past few hours; occasional pacing    Physical Exam  Vitals reviewed  Constitutional:       General: She is not in acute distress  HENT:      Head: Normocephalic  Mouth/Throat:      Mouth: Mucous membranes are moist    Cardiovascular:      Rate and Rhythm: Normal rate  Rhythm irregular  Heart sounds: S1 normal and S2 normal  No murmur heard  Pulmonary:      Effort: Pulmonary effort is normal       Breath sounds: Normal breath sounds  No wheezing or rales  Comments: On RA  Abdominal:      Palpations: Abdomen is soft  Musculoskeletal:      Right lower leg: No edema  Left lower leg: No edema     Skin:     General: Skin is warm  Neurological:      Mental Status: She is alert        Comments: Pt alert and oriented to person/place; somewhat confused about details of hospitalization and care but able to provide meaningful ROS and hx   Psychiatric:         Mood and Affect: Mood normal        LABORATORY RESULTS      CBC with diff:   Results from last 7 days   Lab Units 06/16/22  0557 06/13/22  1623 06/13/22  0813   WBC Thousand/uL 7 37 6 73 6 95   HEMOGLOBIN g/dL 14 3 12 1 12 7   HEMATOCRIT % 43 9 36 4 38 9   MCV fL 96 95 96   PLATELETS Thousands/uL 306 275 289   MCH pg 31 2 31 6 31 4   MCHC g/dL 32 6 33 2 32 6   RDW % 13 5 13 3 13 4   MPV fL 9 9 10 0 10 5   NRBC AUTO /100 WBCs 0 0 0     CMP:  Results from last 7 days   Lab Units 06/16/22  0557 06/15/22  1952 06/15/22  1602 06/15/22  0659 06/14/22 2028 06/14/22  1228 06/14/22  0540 06/13/22  2347 06/13/22  1623 06/13/22  0813   POTASSIUM mmol/L 3 7 3 5 3 5 3 6 3 4* 3 4* 3 5 3 7 4 1 4 4   CHLORIDE mmol/L 96 92*  --  94* 90* 91* 92* 91* 93* 94*   CO2 mmol/L 30 28  --  30 28 27 28 27 25 27   BUN mg/dL 25 30*  --  24 24 20 20 20 23 20   CREATININE mg/dL 1 18 1 33*  --  1 07 1 19 1 10 1 02 1 14 1 34* 1 08   CALCIUM mg/dL 9 7 9 7  --  9 4 9 3 9 2 9 1 9 3 9 1 9 5   AST U/L  --   --   --  41*  --   --   --   --  38 51*   ALT U/L  --   --   --  51  --   --   --   --  50 70   ALK PHOS U/L  --   --   --  63  --   --   --   --  68 76   EGFR ml/min/1 73sq m 44 38  --  50 44 48 53 46 38 49     BMP:  Results from last 7 days   Lab Units 06/16/22  0557 06/15/22  1952 06/15/22  1602 06/15/22  0659 06/14/22 2028 06/14/22  1228 06/14/22  0540 06/13/22  2347   POTASSIUM mmol/L 3 7 3 5 3 5 3 6 3 4* 3 4* 3 5 3 7   CHLORIDE mmol/L 96 92*  --  94* 90* 91* 92* 91*   CO2 mmol/L 30 28  --  30 28 27 28 27   BUN mg/dL 25 30*  --  24 24 20 20 20   CREATININE mg/dL 1 18 1 33*  --  1 07 1 19 1 10 1 02 1 14   CALCIUM mg/dL 9 7 9 7  --  9 4 9 3 9 2 9 1 9 3       Lab Results   Component Value Date    NTBNP 761 (H) 2021    NTBNP 2,773 (H) 2021    NTBNP 506 (H) 2019      Results from last 7 days   Lab Units 06/15/22  1602 22  1623   MAGNESIUM mg/dL 1 9 1 7*     Results from last 7 days   Lab Units 22  1623   TSH 3RD GENERATON uIU/mL 2 006     Lipid Profile:   Lab Results   Component Value Date    CHOL 205 2015    CHOL 195 07/10/2014     Lab Results   Component Value Date    HDL 66 2021    HDL 54 2020    HDL 57 2019     Lab Results   Component Value Date    LDLCALC 88 2021    LDLCALC 104 (H) 2020    LDLCALC 106 (H) 2019     Lab Results   Component Value Date    TRIG 112 2021    TRIG 121 2020    TRIG 106 2019     Cardiac testing:   Results for orders placed during the hospital encounter of 21    Echo complete with contrast if indicated    Narrative  [place] [id number] [address] [place] [address]  [phone]    Transthoracic Echocardiogram  2D, M-mode, Doppler, and Color Doppler    Study date:  2021    Patient: Alyx Lerma  MR number: [id number]  Account number: [de-identified]  : 1945  Age: 67 years  Gender: Female  Status: Inpatient  Location: Bedside  Height: 62 in  Weight: 151 6 lb  BP: 126/ 94 mmHg    Indications: Atrial fibrillation    Diagnoses: I48 0 - Atrial fibrillation    Sonographer:  IGNACIO Collado  Primary Physician:  Davey Donnelly MD  Referring Physician:  Tabitha Lauren MD  Group:  [facility] [id number] [facility]  Interpreting Physician:  Placido Gaona MD    SUMMARY    LEFT VENTRICLE:  Systolic function was normal  Ejection fraction was estimated to be 55 %  There were no regional wall motion abnormalities  Wall thickness was at the upper limits of normal   Doppler parameters were consistent with elevated ventricular end-diastolic filling pressure  LEFT ATRIUM:  The atrium was mildly to moderately dilated  RIGHT ATRIUM:  The atrium was mildly dilated      MITRAL VALVE:  There was mild stenosis  TRICUSPID VALVE:  There was mild to moderate regurgitation  Pulmonary artery systolic pressure was mildly increased  HISTORY: PRIOR HISTORY: HLD, HTN, PAF, chronic CHF, thyroid nodule    PROCEDURE: The procedure was performed at the bedside  This was a routine study  The transthoracic approach was used  The study included complete 2D imaging, M-mode, complete spectral Doppler, and color Doppler  The heart rate was 101 bpm,  at the start of the study  Images were obtained from the parasternal, apical, subcostal, and suprasternal notch acoustic windows  Echocardiographic views were limited due to lung interference  This was a technically difficult study  LEFT VENTRICLE: Size was normal  Systolic function was normal  Ejection fraction was estimated to be 55 %  There were no regional wall motion abnormalities  Wall thickness was at the upper limits of normal  DOPPLER: Transmitral flow  pattern: atrial fibrillation  Left ventricular diastolic function parameters were abnormal  Doppler parameters were consistent with elevated ventricular end-diastolic filling pressure  RIGHT VENTRICLE: The size was normal  Systolic function was normal  Wall thickness was normal     LEFT ATRIUM: The atrium was mildly to moderately dilated  RIGHT ATRIUM: The atrium was mildly dilated  MITRAL VALVE: Valve structure was normal  There was normal leaflet separation  DOPPLER: The transmitral velocity was within the normal range  There was mild stenosis  There was no significant regurgitation  AORTIC VALVE: The valve was trileaflet  Leaflets exhibited normal thickness and normal cuspal separation  DOPPLER: Transaortic velocity was within the normal range  There was no evidence for stenosis  There was no significant  regurgitation  TRICUSPID VALVE: The valve structure was normal  There was normal leaflet separation  DOPPLER: The transtricuspid velocity was within the normal range   There was no evidence for stenosis  There was mild to moderate regurgitation  Pulmonary  artery systolic pressure was mildly increased  PULMONIC VALVE: Leaflets exhibited normal thickness, no calcification, and normal cuspal separation  DOPPLER: The transpulmonic velocity was within the normal range  There was no significant regurgitation  PERICARDIUM: There was no pericardial effusion  The pericardium was normal in appearance  AORTA: The root exhibited normal size  SYSTEMIC VEINS: IVC: The inferior vena cava was normal in size  PULMONARY VEINS: DOPPLER: Doppler signals were not recordable in the pulmonary vein(s)  SYSTEM MEASUREMENT TABLES    2D  %FS: 37 15 %  Ao Diam: 2 74 cm  Ao asc: 2 72 cm  EDV(Teich): 103 17 ml  EF(Teich): 67 06 %  ESV(Teich): 33 98 ml  IVSd: 0 94 cm  LA Area: 13 2 cm2  LA Diam: 3 98 cm  LVEDV MOD A4C: 32 01 ml  LVEF MOD A4C: 63 74 %  LVESV MOD A4C: 11 61 ml  LVIDd: 4 72 cm  LVIDs: 2 96 cm  LVLd A4C: 5 9 cm  LVLs A4C: 4 79 cm  LVPWd: 0 93 cm  RA Area: 8 67 cm2  RVIDd: 3 35 cm  SV MOD A4C: 20 4 ml  SV(Teich): 69 19 ml    CW  TR MaxP 39 mmHg  TR Vmax: 2 89 m/s    MM  TAPSE: 1 51 cm    [facility]redited [facility]chocardi[facility]    Prepared and electronically signed by    Gloria Rapp MD  Signed 2021 11:05:34    No results found for this or any previous visit  No results found for this or any previous visit  No valid procedures specified  No results found for this or any previous visit      Meds/Allergies   current meds:   Current Facility-Administered Medications   Medication Dose Route Frequency    acetaminophen (TYLENOL) tablet 650 mg  650 mg Oral Q4H PRN    amiodarone tablet 200 mg  200 mg Oral Daily    apixaban (ELIQUIS) tablet 5 mg  5 mg Oral BID    ascorbic acid (VITAMIN C) tablet 500 mg  500 mg Oral Daily    cholecalciferol (VITAMIN D3) tablet 400 Units  400 Units Oral Daily    DULoxetine (CYMBALTA) delayed release capsule 30 mg  30 mg Oral Daily    ezetimibe (ZETIA) tablet 10 mg  10 mg Oral Daily    famotidine (PEPCID) tablet 20 mg  20 mg Oral Every Other Day    fluticasone (FLONASE) 50 mcg/act nasal spray 2 spray  2 spray Each Nare Daily    furosemide (LASIX) injection 40 mg  40 mg Intravenous BID (diuretic)    gabapentin (NEURONTIN) capsule 100 mg  100 mg Oral HS    lidocaine (LIDODERM) 5 % patch 1 patch  1 patch Topical Daily    losartan (COZAAR) tablet 50 mg  50 mg Oral Daily    metoprolol succinate (TOPROL-XL) 24 hr tablet 125 mg  125 mg Oral Q12H [address]    potassium chloride (K-DUR,KLOR-CON) CR tablet 20 mEq  20 mEq Oral BID    rOPINIRole (REQUIP) tablet 0 5 mg  0 5 mg Oral HS     Medications Prior to Admission   Medication    acetaminophen (TYLENOL) 650 mg CR tablet    amiodarone 200 mg tablet    apixaban (ELIQUIS) 5 mg    ascorbic acid (VITAMIN C) 500 mg tablet    Cholecalciferol 50 MCG (2000 UT) CAPS    Chromium Picolinate (CHROMIUM PICOLATE PO)    DULoxetine (CYMBALTA) 30 mg delayed release capsule    ezetimibe (ZETIA) 10 mg tablet    famotidine (PEPCID) 20 mg tablet    gabapentin (NEURONTIN) 300 mg capsule    ipratropium (ATROVENT) 0 03 % nasal spray    lidocaine (XYLOCAINE) 5 % ointment    losartan (COZAAR) 25 mg tablet    losartan (COZAAR) 50 mg tablet    metoprolol succinate (TOPROL-XL) 100 mg 24 hr tablet    rOPINIRole (REQUIP) 0 5 mg tablet    traMADol (ULTRAM) 50 mg tablet    TURMERIC PO    zolpidem (AMBIEN) 10 mg tablet     Assessment:  Principal Problem:    Acute heart failure (HCC)  Active Problems:    Essential hypertension    Allergic rhinitis    Fibromyalgia    Hyperlipidemia    Insomnia    Paroxysmal atrial fibrillation (HCC)    Meningioma (HCC)    Hypomagnesemia    Hyponatremia    Hyperbilirubinemia    Elevated serum creatinine    Confusion    Imbalance    Counseling / Coordination of Care  Total floor / unit time spent today 20 minutes    Greater than 50% of total time was spent with the patient and / or family counseling and / or coordination of care  ** Please Note: Dragon 360 Dictation voice to text software may have been used in the creation of this document   **

## 2022-06-16 NOTE — PLAN OF CARE
Problem: OCCUPATIONAL THERAPY ADULT  Goal: Performs self-care activities at highest level of function for planned discharge setting  See evaluation for individualized goals  Description: Treatment Interventions: ADL retraining, Functional transfer training, UE strengthening/ROM, Cognitive reorientation, Endurance training, Patient/family training, Equipment evaluation/education, Compensatory technique education, Continued evaluation, Energy conservation          See flowsheet documentation for full assessment, interventions and recommendations  Outcome: Progressing  Note: Limitation: Decreased ADL status, Decreased Safe judgement during ADL, Decreased cognition, Decreased endurance, Decreased self-care trans, Decreased high-level ADLs  Prognosis: Good  Assessment: Pt seen at bedside for OT session focused on MOCA administration  Pt with improvement noted for transfer from chair>bed and also seems to have improved cognitively compared to initial evaluation  Pt would benefit from continued OT services to address deficits noted in evaluation  Recommend post acute rehab following hospital stay, however should pt continue to make gains during hospital course, pt MAY progress to [address] [place] [address] Avenue  Will continue to follow and evaluate for most appropriate d/c recommendations  See below for [facility] details       OT Discharge Recommendation: Post acute rehabilitation services

## 2022-06-16 NOTE — PLAN OF CARE
Problem: CARDIOVASCULAR - ADULT  Goal: Absence of cardiac dysrhythmias or at baseline rhythm  Description: INTERVENTIONS:  - Continuous cardiac monitoring, vital signs, obtain 12 lead EKG if ordered  - Administer antiarrhythmic and heart rate control medications as ordered  - Monitor electrolytes and administer replacement therapy as ordered  Outcome: Progressing     Problem: RESPIRATORY - ADULT  Goal: Achieves optimal ventilation and oxygenation  Description: INTERVENTIONS:  - Assess for changes in respiratory status  - Assess for changes in mentation and behavior  - Position to facilitate oxygenation and minimize respiratory effort  - Oxygen administered by appropriate delivery if ordered  - Initiate smoking cessation education as indicated  - Encourage broncho-pulmonary hygiene including cough, deep breathe, Incentive Spirometry  - Assess the need for suctioning and aspirate as needed  - Assess and instruct to report SOB or any respiratory difficulty  - Respiratory Therapy support as indicated  Outcome: Progressing     Problem: METABOLIC, FLUID AND ELECTROLYTES - ADULT  Goal: Electrolytes maintained within normal limits  Description: INTERVENTIONS:  - Monitor labs and assess patient for signs and symptoms of electrolyte imbalances  - Administer electrolyte replacement as ordered  - Monitor response to electrolyte replacements, including repeat lab results as appropriate  - Instruct patient on fluid and nutrition as appropriate  Outcome: Progressing     Problem: Potential for Falls  Goal: Patient will remain free of falls  Description: INTERVENTIONS:  - Educate patient/family on patient safety including physical limitations  - Instruct patient to call for assistance with activity   - Consult OT/PT to assist with strengthening/mobility   - Keep Call bell within reach  - Keep bed low and locked with side rails adjusted as appropriate  - Keep care items and personal belongings within reach  - Initiate and maintain comfort rounds  - Make Fall Risk Sign visible to staff  - Offer Toileting every  Hours, in advance of need  - Initiate/Maintain alarm  - Obtain necessary fall risk management equipment:   - Apply yellow socks and bracelet for high fall risk patients  - Consider moving patient to room near nurses station  Outcome: Progressing     Problem: MOBILITY - ADULT  Goal: Maintain or return to baseline ADL function  Description: INTERVENTIONS:  -  Assess patient's ability to carry out ADLs; assess patient's baseline for ADL function and identify physical deficits which impact ability to perform ADLs (bathing, care of mouth/teeth, toileting, grooming, dressing, etc )  - Assess/evaluate cause of self-care deficits   - Assess range of motion  - Assess patient's mobility; develop plan if impaired  - Assess patient's need for assistive devices and provide as appropriate  - Encourage maximum independence but intervene and supervise when necessary  - Involve family in performance of ADLs  - Assess for home care needs following discharge   - Consider OT consult to assist with ADL evaluation and planning for discharge  - Provide patient education as appropriate  Outcome: Progressing  Goal: Maintains/Returns to pre admission functional level  Description: INTERVENTIONS:  - Perform BMAT or MOVE assessment daily    - Set and communicate daily mobility goal to care team and patient/family/caregiver  - Collaborate with rehabilitation services on mobility goals if consulted  - Perform Range of Motion  times a day  - Reposition patient every hours    - Dangle patient  times a day  - Stand patient  times a day  - Ambulate patient  times a day  - Out of bed to chair  times a day   - Out of bed for meals  times a day  - Out of bed for toileting  - Record patient progress and toleration of activity level   Outcome: Progressing     Problem: DISCHARGE PLANNING  Goal: Discharge to home or other facility with appropriate resources  Description: INTERVENTIONS:  - Identify barriers to discharge w/patient and caregiver  - Arrange for needed discharge resources and transportation as appropriate  - Identify discharge learning needs (meds, wound care, etc )  - Arrange for interpretive services to assist at discharge as needed  - Refer to Case Management Department for coordinating discharge planning if the patient needs post-hospital services based on physician/advanced practitioner order or complex needs related to functional status, cognitive ability, or social support system  Outcome: Progressing     Problem: Knowledge Deficit  Goal: Patient/family/caregiver demonstrates understanding of disease process, treatment plan, medications, and discharge instructions  Description: Complete learning assessment and assess knowledge base    Interventions:  - Provide teaching at level of understanding  - Provide teaching via preferred learning methods  Outcome: Progressing     Problem: Prexisting or High Potential for Compromised Skin Integrity  Goal: Skin integrity is maintained or improved  Description: INTERVENTIONS:  - Identify patients at risk for skin breakdown  - Assess and monitor skin integrity  - Assess and monitor nutrition and hydration status  - Monitor labs   - Assess for incontinence   - Turn and reposition patient  - Assist with mobility/ambulation  - Relieve pressure over bony prominences  - Avoid friction and shearing  - Provide appropriate hygiene as needed including keeping skin clean and dry  - Evaluate need for skin moisturizer/barrier cream  - Collaborate with interdisciplinary team   - Patient/family teaching  - Consider wound care consult   Outcome: Progressing     Problem: SAFETY,RESTRAINT: NV/NON-SELF DESTRUCTIVE BEHAVIOR  Goal: Remains free of harm/injury (restraint for non violent/non self-detsructive behavior)  Description: INTERVENTIONS:  - Instruct patient/family regarding restraint use   - Assess and monitor physiologic and psychological status   - Provide interventions and comfort measures to meet assessed patient needs   - Identify and implement measures to help patient regain control  - Assess readiness for release of restraint   Outcome: Progressing  Goal: Returns to optimal restraint-free functioning  Description: INTERVENTIONS:  - Assess the patient's behavior and symptoms that indicate continued need for restraint  - Identify and implement measures to help patient regain control  - Assess readiness for release of restraint   Outcome: Progressing 05-Apr-2020 17:21

## 2022-06-16 NOTE — QUICK NOTE
I spoke to this patient's daughter, Chidi Ryan, over the phone to provide a comprehensive clinical update  I answered all of her questions and addressed all of her concerns to the best of my ability and to her satisfaction

## 2022-06-16 NOTE — PROGRESS NOTES
[facility]  Progress Note - Brent Flores 1945, 67 y o  female MRN: [id number]  Unit/Bed#: S -12 Encounter: [id number]  Primary Care Provider: Frederick Boxer, MD   Date and time admitted to hospital: 6/13/2022  4:27 PM     * Acute heart failure [facility])  Assessment & Plan  Wt Readings from Last 3 Encounters:   06/15/22 73 8 kg (162 lb 12 8 oz)   06/07/22 78 kg (172 lb)   05/16/22 76 7 kg (169 lb)     · POA: worsening SOB since 1-1 5 weeks, conversational dyspnea  Exam shows +1 pitting edema in b/l lower extremities, L>R  No JVD  Lungs CTA b/l  · BNP: 928   · CXR: CP angle blunting, pleural effusions on my read  · Last echo 5/16/21: EF 45%, Global hypokinesis, LA, RA dilated, TR (moderate)  · Echo 6/15- LVEF 60%   · S/p IV lasix 20 mg in ED x1--> noted jump in Cr from 1 08-->1 34  · On 06/16 a m , creatinine stable status post IV diuresis    Etiology:  Acute heart failure likely secondary to uncontrolled atrial fibrillation    Plan:  · IV Lasix 40 mg BID  · transition to p o  Diuretics as per cardiology team recommendations  · Encourage PO intake  · Strict I/Os  · Daily weights      Confusion  Assessment & Plan  · Etiology:  Possible metabolic encephalopathy due to hyponatremia, as evidenced by altered mental status with confusion in a patient with hyponatremia, requiring treatment of hyponatremia and serial mental status exams  · Overnight on 06/1-6/15:  Patient became agitated, had to be given Seroquel, Zyprexa and put on restraints    · CT head, 6/15:  Negative for acute pathology  · Vitamin B12, folate levels noted to be elevated  · UA shows trace leukocytes    Lab Results   Component Value Date    [id number] 2,172 (H) 06/15/2022    FOLATE >20 0 (H) 06/15/2022       Plan:  · Delirium precautions  · Avoid sedating medications  · Restarted home medications including:  Cymbalta, gabapentin, zolpidem  · Advised patient to stop taking any vitamin B supplements at home    Hyponatremia  Assessment & Plan  · POA Na: 125--> repeat 128 in setting of signs of volume overload w/ BNP of 928; reported confusion (as noticed by daughter), imbalance (since past few months) at home  · S/p 1 dose IV Lasix 20 mg in ED  · Na 128 on 06/14 a m -->129 on 06/14 afternoon--> 135 on 6/15 am --> 136 on 06/16  · Resolved as of 06/16/2022    Plan:   · Encourage PO intake  · BMP daily  · Consider resuming losartan given sodium is within normal limits  · Monitor Cr    Paroxysmal atrial fibrillation ([facility] [address] )  Assessment & Plan  · Was supposed to have an ablation at [facility] on 06/14  · EKG shows rate controlled Afib   · Overnight on 06/14-6/15- AFib with RVR; had to be given Lopressor 5 mg IV x1    Plan:   · Toprol increased to 125 mg b i d , amiodarone 200 mg daily, Eliquis 5 mg b i d  · Digoxin:  250 mcg q 6h  · Low threshold to discontinue amiodarone as it may be contributing to patient's gait imbalance  · Monitor on telemetry  · Will inform family to reschedule ablation as per Cardiology team's recommendations    Hyperbilirubinemia  Assessment & Plan  Lab Results   Component Value Date    TBILI 1 60 (H) 06/15/2022    TBILI 1 85 (H) 06/15/2022    TBILI 1 66 (H) 06/13/2022     Elevated direct bilirubin noted (0 38)  Likely in the setting of acute heart failure exacerbation    · Benign abdominal exam  · Monitor via [facility]      Meningioma [facility])  Assessment & Plan  · MRI brain in 4/22 demonstrated meningioma  · Follows w/ Neurosurgery outpatient  · Unlikely to be causing syx of imbalance, confusion  · Non focal neuro exam- hold off on [address] for now  · Obtain CTH if pt has new deficits overnight  Imbalance  Assessment & Plan  · Patient complained of gait imbalance is the past few weeks prior to presentation in setting of meningioma detected on brain imaging in April 2022    · Etiology may be multifactorial including hyponatremia (Na 125 on presentation), amiodarone (recent change in cardiac medications in 2022), meningioma  · PT/OT eval    Elevated serum creatinine  Assessment & Plan  · Noted s/p Lasix IV 20 mg X1 in the ED  · In setting of CKD    Plan:   · Monitor Cr level in setting of IV diuresis  · Encourage PO intake    Hypomagnesemia  Assessment & Plan  · Mag 1 7   · Repleted 2g Mag Sulfate IV on     Plan:  · Monitor for electrolyte abnormalities and replete as appropriate      Insomnia  Assessment & Plan  · Restarted Zolpidem on 06/15    Hyperlipidemia  Assessment & Plan  · Continue Zetia 10 mg QD      Fibromyalgia  Assessment & Plan  · On gabapentin 300 mg x2 QHS  · Restarted gabapentin on 06/15    Allergic rhinitis  Assessment & Plan  · Fluticasone 2 sprays QD    Essential hypertension  Assessment & Plan  · Home meds: Toprol 100 mg QD, Cozaar 75 mg QD    Plan:   · Hold Cozaar in setting of rising Cr and aggressive IV diuresis  · Toprol 125 mg b i d  (increased to previous home dosage as per Cardiology recommendations)  · Monitor vitals      VTE Pharmacologic Prophylaxis: VTE Score: 5 High Risk (Score >/= 5) - Pharmacological DVT Prophylaxis Ordered: apixaban (Eliquis)  Sequential Compression Devices Ordered  Patient Centered Rounds: I performed bedside rounds with nursing staff today  Discussions with Specialists or Other Care Team Provider: Cardiology    Education and Discussions with Family / Patient: Updated  (daughter) via phone  Current Length of Stay: 3 day(s)  Current Patient Status: Inpatient   Discharge Plan: Anticipate discharge in 24-48 hrs to home  Code Status: Level 1 - Full Code    Subjective:   Patient seen and examined at bedside  She does not currently endorse any chest pain, abdominal pain, palpitations, worsening shortness of breath, urinary difficulty  She tells me she would like her daughter to be updated and I informed her that internal medicine team would update her daughter      Objective:     Vitals:   Temp (24hrs), Av 9 °F (36 6 °C), Min:97 7 °F (36 5 °C), Max:98 °F (36 7 °C)    Temp:  [97 7 °F (36 5 °C)-98 °F (36 7 °C)] 97 7 °F (36 5 °C)  HR:  [] 94  Resp:  [15-18] 16  BP: (101-165)/(61-93) 140/90  SpO2:  [93 %-96 %] 95 %  Body mass index is 29 78 kg/m²  Input and Output Summary (last 24 hours): Intake/Output Summary (Last 24 hours) at 6/16/2022 0713  Last data filed at 6/16/2022 0501  Gross per 24 hour   Intake --   Output 250 ml   Net -250 ml     Physical Exam  Vitals reviewed  General Examination:  Sitting in chair, cooperative   HEENT: Normocephalic, Atraumatic  Extraocular movements intact, PERRLA  CVS: S1, S2 noted  Tachycardic  No JVD  Irregularly irregular rhythm  Lungs:  Normal effort  No conversational dyspnea  Abdomen: Soft, normal bowel sounds  Non distended, non tender  Ext:  Trace pitting edema bilaterally (improved since yesterday)  Psych: Though Process - logical    Skin: No bleeding/bruising noted  Neuro: A, Ox3  Follows simple, 3 steps commands  Appropriate antigravity strength in all 4 extremities proximally, distally  Additional Data:     Labs:  Results from last 7 days   Lab Units 06/16/22  0557   WBC Thousand/uL 7 37   HEMOGLOBIN g/dL 14 3   HEMATOCRIT % 43 9   PLATELETS Thousands/uL 306   NEUTROS PCT % 62   LYMPHS PCT % 18   MONOS PCT % 13*   EOS PCT % 6     Results from last 7 days   Lab Units 06/16/22  0557 06/15/22  1952 06/15/22  1602 06/15/22  0659   SODIUM mmol/L 136   < >  --  135   POTASSIUM mmol/L 3 7   < > 3 5 3 6   CHLORIDE mmol/L 96   < >  --  94*   CO2 mmol/L 30   < >  --  30   BUN mg/dL 25   < >  --  24   CREATININE mg/dL 1 18   < >  --  1 07   ANION GAP mmol/L 10   < >  --  11   CALCIUM mg/dL 9 7   < >  --  9 4   ALBUMIN g/dL  --   --   --  3 7   TOTAL BILIRUBIN mg/dL  --   --  1 60* 1 85*   ALK PHOS U/L  --   --   --  63   ALT U/L  --   --   --  51   AST U/L  --   --   --  41*   GLUCOSE RANDOM mg/dL 114   < >  --  134    < > = values in this interval not displayed  Results from last 7 days   Lab Units 06/13/22  2131   PROCALCITONIN ng/ml 0 13       Lines/Drains:  Invasive Devices  Report    Peripheral Intravenous Line  Duration           Peripheral IV 06/15/22 Distal;Right;Ventral (anterior) Forearm 1 day                  Telemetry:  Telemetry Orders (From admission, onward)             24 Hour Telemetry Monitoring  Continuous x 24 Hours (Telem)        References:    Telemetry Guidelines   Question:  Reason for 24 Hour Telemetry  Answer:  Metabolic/Electrolyte Disturbance with High Probability of Dysrhythmia (K level <3 or >6, or KCL infusion >=10mEq/hr)                 Telemetry Reviewed:  AFib, rate controlled  Indication for Continued Telemetry Use: Arrthymias requiring medical therapy             Imaging:  Reviewed imaging reports from this admission including:  CT head wo contrast   Final Result by Taylor Degroot MD (06/15 1930)      No acute intracranial abnormality  Unchanged left frontal extra-axial mass, which was characterized as a probable meningioma on prior MRI  Workstation performed: [id number]         XR chest 1 view portable   Final Result by Boby Jacobo MD (06/13 2124)      Small bilateral pleural effusions with extensive bibasilar atelectasis and/or infiltrates are seen, right greater than left                    Workstation performed: [id number]           Recent Cultures (last 7 days):         Last 24 Hours Medication List:   Current Facility-Administered Medications   Medication Dose Route Frequency Provider Last Rate    acetaminophen  650 mg Oral Q4H PRN Casie Richardson DO      amiodarone  200 mg Oral Daily Demi Munoz MD      apixaban  5 mg Oral BID Demi Munoz MD      ascorbic acid  500 mg Oral Daily Demi Munoz MD      cholecalciferol  400 Units Oral Daily Demi Munoz MD      DULoxetine  30 mg Oral Daily Demi Munoz MD      ezetimibe  10 mg Oral Daily Demi Munoz MD      famotidine  20 mg Oral Every Other Day Jenifer Ziegler MD      fluticasone  2 spray Each Nare Daily Jenifer Ziegler MD      furosemide  40 mg Intravenous BID (diuretic) JENNY Marrufo      gabapentin  100 mg Oral BID Jenifer Ziegler MD      lidocaine  1 patch Topical Daily Jenifer Ziegler MD      losartan  50 mg Oral Daily JENNY Hernandez      metoprolol succinate  125 mg Oral Q12H [address] JENNY Hernandez      potassium chloride  20 mEq Oral BID JENNY Hernandez      rOPINIRole  0 5 mg Oral HS Jenifer Ziegler MD      zolpidem  5 mg Oral HS Jenifer Ziegler MD          Today, Patient Was Seen By: Jenifer Ziegler MD    **Please Note: This note may have been constructed using a voice recognition system  **

## 2022-06-16 NOTE — OCCUPATIONAL THERAPY NOTE
Occupational Therapy Treatment Note      Sara Blair    6/16/2022    Principal Problem:    Acute heart failure ([facility] [address] )  Active Problems:    Essential hypertension    Allergic rhinitis    Fibromyalgia    Hyperlipidemia    Insomnia    Paroxysmal atrial fibrillation (HCC)    Meningioma (HCC)    Hypomagnesemia    Hyponatremia    Hyperbilirubinemia    Elevated serum creatinine    Confusion    Imbalance      Past Medical History:   Diagnosis Date    Actinic keratosis     last assessed - [id number]    Arthritis     Atrial fibrillation with rapid ventricular response ([facility] [address] )     last assessed - [id number]    Basal cell carcinoma     Benign colon polyp     last assessed - [id number]    Disease of thyroid gland     Effusion of knee joint right     Resolved - [id number]    Esophageal reflux     Fibromyalgia     last assessed - [id number]    Fibromyalgia, primary     GERD (gastroesophageal reflux disease)     Hypertension     Palpitations     last assessed - [id number]    Peroneal tendonitis, right     last assessed - [id number]    Right lumbar radiculopathy 3/17/2016    Thyroid cancer ([facility] [address] )     Thyroid nodule     Trochanteric bursitis of left hip 3/9/2018       Past Surgical History:   Procedure Laterality Date    CATARACT EXTRACTION Bilateral     COLONOSCOPY  03/2018    EYE SURGERY      HYSTERECTOMY      JOINT REPLACEMENT Left     knee    KS REVISE MEDIAN N/CARPAL TUNNEL SURG Right 11/14/2019    Procedure: RELEASE CARPAL TUNNEL;  Surgeon: Cecilia Finley MD;  Location: [facility];  Service: Orthopedics    KS THYROID LOBECTOMY,UNILAT Left 12/16/2020    Procedure: Left THYROID LOBECTOMY;  Surgeon: Kemal Haynes MD;  Location: [facility];  Service: ENT    RECTAL POLYPECTOMY      REPLACEMENT TOTAL KNEE Left     last assessed - [id number]    TONSILLECTOMY      TOTAL ABDOMINAL HYSTERECTOMY      TUBAL LIGATION      US GUIDED THYROID BIOPSY  10/14/2020        06/16/22 1400   OT Last Visit   OT Visit Date 06/16/22   Note Type   Note Type Treatment   Restrictions/Precautions   Other Precautions Cognitive; Impulsive   Pain Assessment   Pain Assessment Tool 0-10   Pain Location/Orientation Location: Rib Cage   Pain Onset/Description Descriptor: Sore;Descriptor: Pressure;Frequency: Intermittent   Bed Mobility   Sit to Supine 4  Minimal assistance   Additional items Bedrails;HOB elevated   Additional Comments 2 person assist to boost toward [facility]  Pt with rib discomfort and therefore unable to assist with boosting self  Functional Mobility   Functional Mobility   (CGA)   Additional Comments OT arrived as pt transferring self from chair to bed   Cognition   Overall Cognitive Status Impaired   Arousal/Participation Cooperative   Attention Attends with cues to redirect   Orientation Level Oriented to person;Oriented to place;Oriented to time   Memory Within functional limits   Following Commands Follows one step commands with increased time or repetition   Comments Pt verified ID by stating name and   Cognition Assessment Tools MOCA   Score 0 73   Assessment   Assessment Pt seen at bedside for OT session focused on MOCA administration  with improvement noted for transfer from chair>bed and also seems to have improved cognitively compared to initial evaluation  Pt would benefit from continued OT services to address deficits noted in evaluation  Recommend post acute rehab following hospital stay, however should pt continue to make gains during hospital course, pt MAY progress to [address] [place] [address] Avenue  Will continue to follow and evaluate for most appropriate d/c recommendations  See below for [facility] REHABILITATION details  Plan   Treatment Interventions ADL retraining;Functional transfer training;UE strengthening/ROM; Cognitive reorientation; Endurance training;Patient/family training;Equipment evaluation/education; Compensatory technique education;Continued evaluation; Energy conservation   Goal Expiration Date 22   OT Treatment Day 1   OT Frequency 3-5x/wk   Recommendation   OT Discharge Recommendation Post acute rehabilitation services   AM-PAC Daily Activity Inpatient   Lower Body Dressing 3   Bathing 3   Toileting 3   Upper Body Dressing 3   Grooming 3   Eating 4   Daily Activity Raw Score 19   Daily Activity Standardized Score (Calc for Raw Score >=11) 40 22       [facility] [place] Cognitive Assessment  (Please refer to MR for scanned copy of paper assessment)     Visuospatial/Executive 3/5  -alternating trail makin points  -visuoconstructional skills (copy 3D cube): 0 points  -Draw clock: 3 points     Naming 3/3     Memory (immediate recall)no points for this section however immediate recall was 3/5 words for initial trail and then 3/5 for second trial  Again, no points are given for this section  Attention  -digit span: 2/2  -vigilance: 0/1  -serial 7 subtraction: 1/3     Language  Sentence repitition: 1/2  Verbal fluency: 1/     Abstraction 2/2     Delayed recall  4/5 words recalled with NO cue  1/5 words recalled with CATEGORY cue  Memory index score (MIS) 14/15     Orientation 5/6     TOTAL SCORE 22/30  (normal >/= 26/30)      Based on normative date, pt score indicating mild cognitive impairment (MCI)         Romy Bal, OT

## 2022-06-17 VITALS
HEIGHT: 62 IN | SYSTOLIC BLOOD PRESSURE: 154 MMHG | RESPIRATION RATE: 17 BRPM | OXYGEN SATURATION: 96 % | DIASTOLIC BLOOD PRESSURE: 92 MMHG | BODY MASS INDEX: 29.58 KG/M2 | HEART RATE: 72 BPM | WEIGHT: 160.72 LBS | TEMPERATURE: 98.3 F

## 2022-06-17 PROBLEM — E87.1 HYPONATREMIA: Status: RESOLVED | Noted: 2022-06-13 | Resolved: 2022-06-17

## 2022-06-17 PROBLEM — R79.89 ELEVATED SERUM CREATININE: Status: RESOLVED | Noted: 2022-06-13 | Resolved: 2022-06-17

## 2022-06-17 PROCEDURE — 97530 THERAPEUTIC ACTIVITIES: CPT

## 2022-06-17 PROCEDURE — 99239 HOSP IP/OBS DSCHRG MGMT >30: CPT | Performed by: INTERNAL MEDICINE

## 2022-06-17 PROCEDURE — 97164 PT RE-EVAL EST PLAN CARE: CPT

## 2022-06-17 PROCEDURE — 97116 GAIT TRAINING THERAPY: CPT

## 2022-06-17 RX ORDER — METOPROLOL SUCCINATE 25 MG/1
125 TABLET, EXTENDED RELEASE ORAL EVERY 12 HOURS SCHEDULED
Qty: 150 TABLET | Refills: 0 | Status: SHIPPED | OUTPATIENT
Start: 2022-06-17 | End: 2022-06-20 | Stop reason: SDUPTHER

## 2022-06-17 RX ORDER — METOPROLOL SUCCINATE 25 MG/1
125 TABLET, EXTENDED RELEASE ORAL 2 TIMES DAILY
Qty: 150 TABLET | Refills: 0 | Status: CANCELLED | OUTPATIENT
Start: 2022-06-17 | End: 2022-07-02

## 2022-06-17 RX ORDER — GABAPENTIN 100 MG/1
100 CAPSULE ORAL
Qty: 15 CAPSULE | Refills: 0 | Status: SHIPPED | OUTPATIENT
Start: 2022-06-17 | End: 2022-06-27

## 2022-06-17 RX ORDER — DIGOXIN 125 MCG
125 TABLET ORAL DAILY
Qty: 15 TABLET | Refills: 0 | Status: SHIPPED | OUTPATIENT
Start: 2022-06-18 | End: 2022-06-20 | Stop reason: SDUPTHER

## 2022-06-17 RX ORDER — EZETIMIBE 10 MG/1
10 TABLET ORAL DAILY
Status: DISCONTINUED | OUTPATIENT
Start: 2022-06-17 | End: 2022-06-17 | Stop reason: HOSPADM

## 2022-06-17 RX ADMIN — POTASSIUM CHLORIDE 20 MEQ: 1500 TABLET, EXTENDED RELEASE ORAL at 17:03

## 2022-06-17 RX ADMIN — APIXABAN 5 MG: 5 TABLET, FILM COATED ORAL at 17:03

## 2022-06-17 RX ADMIN — POTASSIUM CHLORIDE 20 MEQ: 1500 TABLET, EXTENDED RELEASE ORAL at 08:21

## 2022-06-17 RX ADMIN — FAMOTIDINE 20 MG: 20 TABLET ORAL at 08:21

## 2022-06-17 RX ADMIN — FLUTICASONE PROPIONATE 2 SPRAY: 50 SPRAY, METERED NASAL at 08:22

## 2022-06-17 RX ADMIN — METOPROLOL SUCCINATE 125 MG: 100 TABLET, EXTENDED RELEASE ORAL at 08:21

## 2022-06-17 RX ADMIN — APIXABAN 5 MG: 5 TABLET, FILM COATED ORAL at 08:21

## 2022-06-17 RX ADMIN — OXYCODONE HYDROCHLORIDE AND ACETAMINOPHEN 500 MG: 500 TABLET ORAL at 08:21

## 2022-06-17 RX ADMIN — DULOXETINE HYDROCHLORIDE 30 MG: 30 CAPSULE, DELAYED RELEASE ORAL at 08:21

## 2022-06-17 RX ADMIN — DIGOXIN 125 MCG: 125 TABLET ORAL at 08:21

## 2022-06-17 RX ADMIN — LORATADINE 10 MG: 10 TABLET ORAL at 08:21

## 2022-06-17 RX ADMIN — METOPROLOL SUCCINATE 125 MG: 100 TABLET, EXTENDED RELEASE ORAL at 17:03

## 2022-06-17 RX ADMIN — AMIODARONE HYDROCHLORIDE 200 MG: 200 TABLET ORAL at 08:21

## 2022-06-17 RX ADMIN — EZETIMIBE 10 MG: 10 TABLET ORAL at 17:03

## 2022-06-17 RX ADMIN — CHOLECALCIFEROL TAB 10 MCG (400 UNIT) 400 UNITS: 10 TAB at 08:21

## 2022-06-17 RX ADMIN — LOSARTAN POTASSIUM 50 MG: 50 TABLET, FILM COATED ORAL at 08:21

## 2022-06-17 NOTE — ASSESSMENT & PLAN NOTE
· POA Na: 125--> repeat 128 in setting of signs of volume overload w/ BNP of 928; reported confusion (as noticed by daughter), imbalance (since past few months) at home  · S/p 1 dose IV Lasix 20 mg in ED  · Na 128 on 06/14 a m -->129 on 06/14 afternoon--> 135 on 6/15 am --> 136 on 06/16  · Resolved as of 06/17/2022    Plan:   · Encourage PO intake

## 2022-06-17 NOTE — ASSESSMENT & PLAN NOTE
· Was supposed to have an ablation at [facility] on 06/14  · EKG shows rate controlled Afib   · Overnight on 06/14-6/15- AFib with RVR; had to be given Lopressor 5 mg IV x1    Plan:   · Toprol increased to 125 mg b i d , amiodarone 200 mg daily, Eliquis 5 mg b i d  · Digoxin:  250 mcg q 6h--> digoxin 125 mcg p o   Daily To be continued upon discharge  · Follow-up with cardiology outpatient on Monday, 06/20/2022 (scheduled)  · Reschedule ablation with [facility] outpatient-family informed

## 2022-06-17 NOTE — PLAN OF CARE
Problem: PHYSICAL THERAPY ADULT  Goal: Performs mobility at highest level of function for planned discharge setting  See evaluation for individualized goals  Description: Treatment/Interventions: Functional transfer training, LE strengthening/ROM, Elevations, Therapeutic exercise, Endurance training, Cognitive reorientation, Patient/family training, Equipment eval/education, Bed mobility, Gait training          See flowsheet documentation for full assessment, interventions and recommendations  Outcome: Progressing  Note: Prognosis: Fair  Problem List: Decreased strength, Decreased endurance, Impaired balance, Decreased mobility, Decreased coordination, Decreased safety awareness, Impaired tone  Assessment: Pt shows improvement in mobility status since initiation of PT w/ increased ambulation tolerance and initiation of stair use  Pt continues to need standby assist to maintain mobility safety  Mobility impairments are related to weakness, impaired balance, decreased endurance, and gait deviations  Fall risk is evident in Gait Speed of 0 47 m/s (less than 0 7 m/s indicates risk for falling)  Pt needs continued inpatient PT to reduce fall risk factors  Discharge recommendation is for home PT to facilitate safe return home w/ family support  Pt trial use of single point cane, though increased gait speed was noted w/ use of roller walker  Pt is recommended for continued use of RW w/ plan to progress to use of SPC as appropriate  PT Discharge Recommendation: Home with home health rehabilitation          See flowsheet documentation for full assessment

## 2022-06-17 NOTE — ASSESSMENT & PLAN NOTE
Wt Readings from Last 3 Encounters:   06/16/22 72 9 kg (160 lb 11 5 oz)   06/07/22 78 kg (172 lb)   05/16/22 76 7 kg (169 lb)     · POA: worsening SOB since 1-1 5 weeks, conversational dyspnea  Exam shows +1 pitting edema in b/l lower extremities, L>R  No JVD  Lungs CTA b/l  · BNP: 928   · CXR: CP angle blunting, pleural effusions on my read  · Last echo 5/16/21: EF 45%, Global hypokinesis, LA, RA dilated, TR (moderate)  · Echo 6/15- LVEF 60%   · S/p IV lasix 20 mg in ED x1--> noted jump in Cr from 1 08-->1 34  · On 06/16 a m , creatinine stable status post IV diuresis    Etiology:  Acute heart failure likely secondary to uncontrolled atrial fibrillation    Plan:  · As per cardiology team, no need for oral diuretics upon discharge    · Continue the following medications upon discharge:  Digoxin 125 mcg daily, Toprol 125 mg b i d , Eliquis 5 mg b i d , amiodarone 200 mg daily  · Follow-up with cardiology outpatient on Monday, 06/20/2022  · Reschedule ablation with [facility]

## 2022-06-17 NOTE — PHYSICAL THERAPY NOTE
PHYSICAL THERAPY NOTE      Patient Name: Jesus Colin  IAERW'O Date: 22 0826   PT Last Visit   PT Visit Date 22   Note Type   Note Type Treatment   Pain Assessment   Pain Assessment Tool 0-10   Pain Score No Pain   Restrictions/Precautions   Weight Bearing Precautions Per Order No   Other Precautions Cognitive; Impulsive; Chair Alarm; Bed Alarm   General   Family/Caregiver Present No   Subjective   Subjective Patient supine in bed and is agreeable to participate in therapy session  Pt identifers obtained from name &   Bed Mobility   Supine to Sit 5  Supervision   Additional items Assist x 1;HOB elevated; Bedrails; Increased time required;Verbal cues   Sit to Supine Unable to assess   Additional Comments Patient seated OOB in recliner post session with chair alarm engaged, call bell and belongings in reach  Transfers   Sit to Stand 5  Supervision   Additional items Assist x 1; Increased time required;Verbal cues   Stand to Sit 5  Supervision   Additional items Assist x 1; Increased time required;Verbal cues   Toilet transfer 5  Supervision   Additional items Assist x 1; Increased time required;Verbal cues;Standard toilet   Ambulation/Elevation   Gait pattern Excessively slow; Short stride   Gait Assistance 4  Minimal assist  (CGA)   Additional items Assist x 1;Verbal cues   Assistive Device [facility]   Distance 115' x1, 15' x1   Stair Management Assistance 4  Minimal assist   Additional items Assist x 1;Verbal cues; Increased time required   Stair Management Technique One rail R;Step to pattern; Foreward; With cane   Number of Stairs 4   Balance   Static Sitting Good   Static Standing Fair   Ambulatory Fair -   Activity Tolerance   Activity Tolerance Patient tolerated treatment well   Nurse Made Aware Spoke to Luz Maria Batista RN   Assessment   Prognosis Fair   Problem List Decreased strength;Decreased endurance; Impaired balance;Decreased mobility; Decreased coordination;Decreased safety awareness; Impaired tone   Assessment Patient agreeable and motivated to participate in therapy session  Patient demonstrates progression through all phases of mobility  Supine to sit with supervision and increased time to complete transition to EOB  Mutliple sit<>stand transfers with supervision and vebral instruction for hand placement  Pt able to ambulate increased gait distance with SPC and fluctuations of close supervision to CGA  Pt able to ascend and descend 4 stairs with unilateral HR and SPC with min ax1 and instruction for [facility]  Pt denies lightheaded/dizziness/pain throughout activity  PT to follow up with mobility progression and discharge recommendations as appropriate  Goals   Patient Goals to get better   STG Expiration Date 06/25/22   PT Treatment Day 2   Plan   Treatment/Interventions Functional transfer training;LE strengthening/ROM; Elevations; Therapeutic exercise; Endurance training;Patient/family training;Equipment eval/education; Bed mobility;Gait training;Spoke to nursing   PT Frequency 4-6x/wk   AM-PAC Basic Mobility Inpatient   Turning in Bed Without Bedrails 3   Lying on Back to Sitting on Edge of Flat Bed 3   Moving Bed to Chair 3   Standing Up From Chair 3   Walk in Room 3   Climb 3-5 Stairs 3   Basic Mobility Inpatient Raw Score 18   Basic Mobility Standardized Score 41 05   Highest Level Of Mobility   JH-HLM Goal 6: Walk 10 steps or more   JH-HLM Achieved 7: Walk 25 feet or more       The patient's AM-PAC Basic Mobility Inpatient Short Form Raw Score is 18  A Raw score of greater than 16 suggests the patient may benefit from discharge to home  Please also refer to the recommendation of the Physical Therapist for safe discharge planning        Raheem Olvera PTA

## 2022-06-17 NOTE — CASE MANAGEMENT
Case Management Progress Note    Patient name Jeri Alvares  Location S /S -35 MRN [id number]  : 1945 Date 2022       LOS (days): 4  Geometric Mean LOS (GMLOS) (days): 3 80  Days to GMLOS:-0 1        OBJECTIVE:    Current admission status: Inpatient  Preferred Pharmacy:   [facility] [address]  Phone: [phone] Fax: [phone]    Primary Care Provider: Esdras Garcia MD    Primary Insurance: MEDICARE  Secondary Insurance: A[employer]    PROGRESS NOTE:    CM spoke with pt daughter, Shawn [place]  Informed her that PT would re-evaluate pt this morning as they are anticipating she will be cleared for home  CM informed her of current accepting facilities if rehab is needed:    HFM  OO  [address] [facility] waiting on some responses from facility  Tasia Knight aware that CM will f/u on PT final recs

## 2022-06-17 NOTE — DISCHARGE INSTR - AVS FIRST PAGE
Dear Cristofer Templeton,     It was our pleasure to care for you here at [facility], [employer]  It is our hope that we were always able to exceed the expected standards for your care during your stay  You were hospitalized due to acute heart failure exacerbation  You were cared for on the 4th floor by Holly Nieves MD under the service of Kamilah Garcia MD with the [facility] who covers for your primary care physician (PCP), Ernie Glasgow MD, while you were hospitalized  If you have any questions or concerns related to this hospitalization, you may contact us at [phone]  For follow up as well as any medication refills, we recommend that you follow up with your primary care physician  A registered nurse will reach out to you by phone within a few days after your discharge to answer any additional questions that you may have after going home  However, at this time we provide for you here, the most important instructions / recommendations at discharge:     Notable Medication Adjustments -   Take: Cozaar 50 mg daily, digoxin 125 mcg daily,  amiodarone 200mg and metoprolol succinate 125mg twice a day as well as eliquis 5 mg twice a day  Decrease gabapentin to 100 mg daily at night time  Testing Required after Discharge -   None  Important follow up information -   Follow-up with cardiology team on 06/20/2022 outpatient  Follow-up with PCP within 1 week of discharge  Follow-up with [facility] for ablation procedure  Other Instructions -     Please review this entire after visit summary as additional general instructions including medication list, appointments, activity, diet, any pertinent wound care, and other additional recommendations from your care team that may be provided for you        Sincerely,     Holly Nieves MD

## 2022-06-17 NOTE — ASSESSMENT & PLAN NOTE
· Home meds: Toprol 100 mg QD, Cozaar 75 mg QD    Plan:   · Decreased Cozaar to 50 mg daily  · Toprol 125 mg b i d  (increased to previous home dosage as per Cardiology recommendations)  · Monitor vitals

## 2022-06-17 NOTE — PLAN OF CARE
Problem: PHYSICAL THERAPY ADULT  Goal: Performs mobility at highest level of function for planned discharge setting  See evaluation for individualized goals  Description: Treatment/Interventions: Functional transfer training, LE strengthening/ROM, Elevations, Therapeutic exercise, Endurance training, Cognitive reorientation, Patient/family training, Equipment eval/education, Bed mobility, Gait training          See flowsheet documentation for full assessment, interventions and recommendations  Outcome: Progressing  Note: Prognosis: Fair  Problem List: Decreased strength, Decreased endurance, Impaired balance, Decreased mobility, Decreased coordination, Decreased safety awareness, Impaired tone  Assessment: Patient agreeable and motivated to participate in therapy session  Patient demonstrates progression through all phases of mobility  Supine to sit with supervision and increased time to complete transition to EOB  Mutliple sit<>stand transfers with supervision and vebral instruction for hand placement  Pt able to ambulate increased gait distance with SPC and fluctuations of close supervision to CGA  Pt able to ascend and descend 4 stairs with unilateral HR and SPC with min ax1 and instruction for [address] sequencing  Pt denies lightheaded/dizziness/pain throughout activity  PT to follow up with mobility progression and discharge recommendations as appropriate  PT Discharge Recommendation: Post acute rehabilitation services          See flowsheet documentation for full assessment

## 2022-06-17 NOTE — ASSESSMENT & PLAN NOTE
Lab Results   Component Value Date    TBILI 1 60 (H) 06/15/2022    TBILI 1 85 (H) 06/15/2022    TBILI 1 66 (H) 06/13/2022     Elevated direct bilirubin noted (0 38)  Likely in the setting of acute heart failure exacerbation    · Benign abdominal exam  · Follow-up with PCP within 1 week of discharge

## 2022-06-17 NOTE — PHYSICAL THERAPY NOTE
PHYSICAL THERAPY REEVAL NOTE    Patient Name: Heather Danielson  [id number] Date: 6/17/2022 06/17/22 1456   PT Last Visit   PT Visit Date 06/17/22   Pain Assessment   Pain Assessment Tool 0-10   Pain Score No Pain  (at rest, 5/10 w/ coughing)   Pain Location/Orientation Orientation: Right;Location: Rib Cage   Restrictions/Precautions   Other Precautions Cognitive; Chair Alarm; Bed Alarm; Fall Risk   General   Chart Reviewed Yes   Additional Pertinent History Pt was seen previously for PT intervention  Reeval completed due to pt completing previously established goals  Dx: acute heart failure, confusion, hyponatremia, a-fib, elevated serum creatinine, hypomagnesemia, and hyperbilirubinemia  Comorbidities and personal factors: see initial eval   room air resting pulse 95% and 88 BPM  UEs: WFL active ROM  LEs: WFL active ROM, 4- to 4/5  light touch normal UEs and LEs  coordination impaired UEs, intention tremor L UE  Clinical presentation: evolving (evident in need for standby assist to maintain safety w/ ambulation and stair use, necessity for occasional input for task focus and mobility technique)  Family/Caregiver Present No   Cognition   Arousal/Participation Cooperative   Attention Attends with cues to redirect   Orientation Level Oriented to person;Oriented to place;Oriented to time; Other (Comment)  (pt was identified w/ full name, birth date)   Following Commands Follows one step commands with increased time or repetition   Subjective   Subjective pt seen supine in bed  pt agreed to participate in PT session  pt states having right ribs w/ coughing  Bed Mobility   Supine to Sit 7  Independent   Additional items HOB elevated; Increased time required   Transfers   Sit to Stand 5  Supervision   Additional items Increased time required   Stand to Sit 5  Supervision   Additional items Increased time required   Additional Comments Comfortable Gait Speed w/ SPC0 41 m/s, w/ roller walker 0 47 m/s   Ambulation/Elevation   Gait pattern Foward flexed; Short stride   Gait Assistance 5  Supervision  (w/ cane, modified independent w/ roller walker)   Additional items Verbal cues  (for device positioning)   Assistive Device Rolling walker;SPC   Distance 180 feet w/ SPC, 160 feet w/ roller walker  seated rest break was needed  (additional not possible due to fatigue)   Stair Management Assistance 5  Supervision   Additional items Verbal cues; Increased time required  (for railing use)   Stair Management Technique One rail R;Step to pattern   Number of Stairs 4   Balance   Static Sitting Good   Dynamic Sitting Fair +   Static Standing Fair +  (w/ roller walker)   Ambulatory Fair  (w/ roller walker)   Activity Tolerance   Activity Tolerance Patient limited by fatigue   Nurse Made Aware spoke to Dr Tristen Sanchez CM   Assessment   Prognosis Fair   Problem List Decreased strength;Decreased endurance; Impaired balance;Decreased mobility; Decreased coordination;Decreased safety awareness; Impaired tone   Assessment Pt shows improvement in mobility status since initiation of PT w/ increased ambulation tolerance and initiation of stair use  Pt continues to need standby assist to maintain mobility safety  Mobility impairments are related to weakness, impaired balance, decreased endurance, and gait deviations  Fall risk is evident in Gait Speed of 0 47 m/s (less than 0 7 m/s indicates risk for falling)  Pt needs continued inpatient PT to reduce fall risk factors  Discharge recommendation is for home PT to facilitate safe return home w/ family support  Pt trial use of single point cane, though increased gait speed was noted w/ use of roller walker  Pt is recommended for continued use of RW w/ plan to progress to use of SPC as appropriate  Goals   Patient Goals get out of here and get home  work on my garden     STG Expiration Date 06/27/22   Short Term Goal #1 pt will: Increase bilateral LE strength 1/2 grade to facilitate independent mobility, Perform all transfers independently to improve independence, Ambulate 300 ft  with least restrictive assistive device independently w/o LOB to improve functional independence, Navigate 2 stair(s) independently with unilateral handrail to facilitate return to previous living environment, Increase ambulatory balance 1 grade to decrease risk for falls, Complete exercise program independently to increase strength and endurance, Tolerate 3 hr OOB to faciliate upright tolerance, Improve gait speed to 0 57 m/s to reduce fall risk and increase independence and Improve Barthel Index score to 90 or greater to facilitate independence   PT Treatment Day 1   Plan   Treatment/Interventions Functional transfer training;LE strengthening/ROM; Elevations; Therapeutic exercise; Endurance training;Patient/family training;Equipment eval/education;Gait training   Progress Progressing toward goals   PT Frequency 4-6x/wk   Recommendation   PT Discharge Recommendation Home with home health rehabilitation   Additional Comments (S)  pt would benefit from Gerontology consult to address cognition and aging related issues   Additional Comments 2 Pt is recommended for continued use of RW w/ plan to progress to use of SPC as appropriate  AM-PAC Basic Mobility Inpatient   Turning in Bed Without Bedrails 4   Lying on Back to Sitting on Edge of Flat Bed 4   Moving Bed to Chair 3   Standing Up From Chair 3   Walk in Room 3   Climb 3-5 Stairs 3   Basic Mobility Inpatient Raw Score 20   Basic Mobility Standardized Score 43 99   Highest Level Of Mobility   JH-HLM Goal 6: Walk 10 steps or more   JH-HLM Achieved 7: Walk 25 feet or more   End of Consult   Patient Position at End of Consult Bedside chair;Bed/Chair alarm activated; All needs within reach   Comfortable Gait Speed w/ SPC0 41 m/s, w/ roller walker 0 47 m/s  Gait Speed Interpretation:  Gain of 0 1 m/s is a predictor of well-being in those w/ abnormal walking speed compared to age-patched peers  <0 7m/s is at an increased risk for falls    Household ambulator: <0 4 m/s  Limited community ambulator: 0 4-0 8 m/s  Community ambulator: 0 8-1 2 m/s  Able to safely cross streets: >1 2 m/s     The patient's AM-PAC Basic Mobility Inpatient Short Form Raw Score is 20  A Raw score of greater than 16 suggests the patient may benefit from discharge to home  Please also refer to the recommendation of the Physical Therapist for safe discharge planning  Skilled inpatient PT recommended while in hospital to progress pt toward treatment goals      Ronan Arshad, PT

## 2022-06-17 NOTE — PLAN OF CARE
Problem: CARDIOVASCULAR - ADULT  Goal: Absence of cardiac dysrhythmias or at baseline rhythm  Description: INTERVENTIONS:  - Continuous cardiac monitoring, vital signs, obtain 12 lead EKG if ordered  - Administer antiarrhythmic and heart rate control medications as ordered  - Monitor electrolytes and administer replacement therapy as ordered  Outcome: Progressing     Problem: RESPIRATORY - ADULT  Goal: Achieves optimal ventilation and oxygenation  Description: INTERVENTIONS:  - Assess for changes in respiratory status  - Assess for changes in mentation and behavior  - Position to facilitate oxygenation and minimize respiratory effort  - Oxygen administered by appropriate delivery if ordered  - Initiate smoking cessation education as indicated  - Encourage broncho-pulmonary hygiene including cough, deep breathe, Incentive Spirometry  - Assess the need for suctioning and aspirate as needed  - Assess and instruct to report SOB or any respiratory difficulty  - Respiratory Therapy support as indicated  Outcome: Progressing     Problem: METABOLIC, FLUID AND ELECTROLYTES - ADULT  Goal: Electrolytes maintained within normal limits  Description: INTERVENTIONS:  - Monitor labs and assess patient for signs and symptoms of electrolyte imbalances  - Administer electrolyte replacement as ordered  - Monitor response to electrolyte replacements, including repeat lab results as appropriate  - Instruct patient on fluid and nutrition as appropriate  Outcome: Progressing     Problem: Potential for Falls  Goal: Patient will remain free of falls  Description: INTERVENTIONS:  - Educate patient/family on patient safety including physical limitations  - Instruct patient to call for assistance with activity   - Consult OT/PT to assist with strengthening/mobility   - Keep Call bell within reach  - Keep bed low and locked with side rails adjusted as appropriate  - Keep care items and personal belongings within reach  - Initiate and maintain comfort rounds  - Make Fall Risk Sign visible to staff  - Offer Toileting every  Hours, in advance of need  - Initiate/Maintain alarm  - Obtain necessary fall risk management equipment:   - Apply yellow socks and bracelet for high fall risk patients  - Consider moving patient to room near nurses station  Outcome: Progressing     Problem: MOBILITY - ADULT  Goal: Maintain or return to baseline ADL function  Description: INTERVENTIONS:  -  Assess patient's ability to carry out ADLs; assess patient's baseline for ADL function and identify physical deficits which impact ability to perform ADLs (bathing, care of mouth/teeth, toileting, grooming, dressing, etc )  - Assess/evaluate cause of self-care deficits   - Assess range of motion  - Assess patient's mobility; develop plan if impaired  - Assess patient's need for assistive devices and provide as appropriate  - Encourage maximum independence but intervene and supervise when necessary  - Involve family in performance of ADLs  - Assess for home care needs following discharge   - Consider OT consult to assist with ADL evaluation and planning for discharge  - Provide patient education as appropriate  Outcome: Progressing  Goal: Maintains/Returns to pre admission functional level  Description: INTERVENTIONS:  - Perform BMAT or MOVE assessment daily    - Set and communicate daily mobility goal to care team and patient/family/caregiver  - Collaborate with rehabilitation services on mobility goals if consulted  - Perform Range of Motion  times a day  - Reposition patient every  hours    - Dangle patient  times a day  - Stand patient  times a day  - Ambulate patient  times a day  - Out of bed to chair  times a day   - Out of bed for meals times a day  - Out of bed for toileting  - Record patient progress and toleration of activity level   Outcome: Progressing     Problem: DISCHARGE PLANNING  Goal: Discharge to home or other facility with appropriate resources  Description: INTERVENTIONS:  - Identify barriers to discharge w/patient and caregiver  - Arrange for needed discharge resources and transportation as appropriate  - Identify discharge learning needs (meds, wound care, etc )  - Arrange for interpretive services to assist at discharge as needed  - Refer to Case Management Department for coordinating discharge planning if the patient needs post-hospital services based on physician/advanced practitioner order or complex needs related to functional status, cognitive ability, or social support system  Outcome: Progressing     Problem: Knowledge Deficit  Goal: Patient/family/caregiver demonstrates understanding of disease process, treatment plan, medications, and discharge instructions  Description: Complete learning assessment and assess knowledge base    Interventions:  - Provide teaching at level of understanding  - Provide teaching via preferred learning methods  Outcome: Progressing     Problem: Prexisting or High Potential for Compromised Skin Integrity  Goal: Skin integrity is maintained or improved  Description: INTERVENTIONS:  - Identify patients at risk for skin breakdown  - Assess and monitor skin integrity  - Assess and monitor nutrition and hydration status  - Monitor labs   - Assess for incontinence   - Turn and reposition patient  - Assist with mobility/ambulation  - Relieve pressure over bony prominences  - Avoid friction and shearing  - Provide appropriate hygiene as needed including keeping skin clean and dry  - Evaluate need for skin moisturizer/barrier cream  - Collaborate with interdisciplinary team   - Patient/family teaching  - Consider wound care consult   Outcome: Progressing     Problem: SAFETY,RESTRAINT: NV/NON-SELF DESTRUCTIVE BEHAVIOR  Goal: Remains free of harm/injury (restraint for non violent/non self-detsructive behavior)  Description: INTERVENTIONS:  - Instruct patient/family regarding restraint use   - Assess and monitor physiologic and psychological status   - Provide interventions and comfort measures to meet assessed patient needs   - Identify and implement measures to help patient regain control  - Assess readiness for release of restraint   Outcome: Progressing  Goal: Returns to optimal restraint-free functioning  Description: INTERVENTIONS:  - Assess the patient's behavior and symptoms that indicate continued need for restraint  - Identify and implement measures to help patient regain control  - Assess readiness for release of restraint   Outcome: Progressing

## 2022-06-17 NOTE — ASSESSMENT & PLAN NOTE
· Etiology:  Possible metabolic encephalopathy due to hyponatremia, as evidenced by altered mental status with confusion in a patient with hyponatremia, requiring treatment of hyponatremia and serial mental status exams  · Overnight on 06/1-6/15:  Patient became agitated, had to be given Seroquel, Zyprexa and put on restraints  · CT head, 6/15:  Negative for acute pathology  · Vitamin B12, folate levels noted to be elevated  · UA shows trace leukocytes    Lab Results   Component Value Date    [id number] 2,172 (H) 06/15/2022    FOLATE >20 0 (H) 06/15/2022       Plan:  · Restarted home medications including:  Cymbalta, gabapentin, zolpidem  · Advised patient to stop taking any vitamin B supplements at home  · Upon discharge, continue home medications and follow-up with PCP within 1 week

## 2022-06-17 NOTE — DISCHARGE SUMMARY
[facility]  Discharge- Hutchinson Forth 1945, 67 y o  female MRN: [id number]  Unit/Bed#: S -74 Encounter: [id number]  Primary Care Provider: Sherman Rahman MD   Date and time admitted to hospital: 6/13/2022  4:27 PM    * Acute heart failure [facility])  Assessment & Plan  Wt Readings from Last 3 Encounters:   06/16/22 72 9 kg (160 lb 11 5 oz)   06/07/22 78 kg (172 lb)   05/16/22 76 7 kg (169 lb)     · POA: worsening SOB since 1-1 5 weeks, conversational dyspnea  Exam shows +1 pitting edema in b/l lower extremities, L>R  No JVD  Lungs CTA b/l  · BNP: 928   · CXR: CP angle blunting, pleural effusions on my read  · Last echo 5/16/21: EF 45%, Global hypokinesis, LA, RA dilated, TR (moderate)  · Echo 6/15- LVEF 60%   · S/p IV lasix 20 mg in ED x1--> noted jump in Cr from 1 08-->1 34  · On 06/16 a m , creatinine stable status post IV diuresis    Etiology:  Acute heart failure likely secondary to uncontrolled atrial fibrillation    Plan:  · As per cardiology team, no need for oral diuretics upon discharge  · Continue the following medications upon discharge:  Digoxin 125 mcg daily, Toprol 125 mg b i d , Eliquis 5 mg b i d , amiodarone 200 mg daily  · Follow-up with cardiology outpatient on Monday, 06/20/2022  · Reschedule ablation with [address]      Confusion  Assessment & Plan  · Etiology:  Possible metabolic encephalopathy due to hyponatremia, as evidenced by altered mental status with confusion in a patient with hyponatremia, requiring treatment of hyponatremia and serial mental status exams  · Overnight on 06/1-6/15:  Patient became agitated, had to be given Seroquel, Zyprexa and put on restraints    · CT head, 6/15:  Negative for acute pathology  · Vitamin B12, folate levels noted to be elevated  · UA shows trace leukocytes    Lab Results   Component Value Date    NARARDTK42 2,172 (H) 06/15/2022    FOLATE >20 0 (H) 06/15/2022       Plan:  · Restarted home medications including:  Cymbalta, gabapentin, zolpidem  · Advised patient to stop taking any vitamin B supplements at home  · Upon discharge, continue home medications and follow-up with PCP within 1 week  Paroxysmal atrial fibrillation (HCC)  Assessment & Plan  · Was supposed to have an ablation at [facility] on 06/14  · EKG shows rate controlled Afib   · Overnight on 06/14-6/15- AFib with RVR; had to be given Lopressor 5 mg IV x1    Plan:   · Toprol increased to 125 mg b i d , amiodarone 200 mg daily, Eliquis 5 mg b i d  · Digoxin:  250 mcg q 6h--> digoxin 125 mcg p o  Daily To be continued upon discharge  · Follow-up with cardiology outpatient on Monday, 06/20/2022 (scheduled)  · Reschedule ablation with [facility] outpatient-family informed    Imbalance  Assessment & Plan  · Patient complained of gait imbalance is the past few weeks prior to presentation in setting of meningioma detected on brain imaging in April 2022  · Etiology may be multifactorial including hyponatremia (Na 125 on presentation), amiodarone (recent change in cardiac medications in February 2022), meningioma    · PT/OT eval    Hyperbilirubinemia  Assessment & Plan  Lab Results   Component Value Date    TBILI 1 60 (H) 06/15/2022    TBILI 1 85 (H) 06/15/2022    TBILI 1 66 (H) 06/13/2022     Elevated direct bilirubin noted (0 38)  Likely in the setting of acute heart failure exacerbation    · Benign abdominal exam  · Follow-up with PCP within 1 week of discharge      Hyponatremia-resolved as of 6/17/2022  Assessment & Plan  · POA Na: 125--> repeat 128 in setting of signs of volume overload w/ BNP of 928; reported confusion (as noticed by daughter), imbalance (since past few months) at home  · S/p 1 dose IV Lasix 20 mg in ED  · Na 128 on 06/14 a m -->129 on 06/14 afternoon--> 135 on 6/15 am --> 136 on 06/16  · Resolved as of 06/17/2022    Plan:   · Encourage PO intake    Meningioma ([address] )  Assessment & Plan  · MRI brain in 4/22 demonstrated meningioma  · CTH, 6/15:  No acute changes  · Follows w/ Neurosurgery outpatient ; continue follow-up as scheduled post discharge  · Unlikely to be causing syx of imbalance, confusion    Hypomagnesemia  Assessment & Plan  · Mag 1 7   · Repleted 2g Mag Sulfate IV on 06/13    Plan:  · Monitor for electrolyte abnormalities and replete as appropriate      Insomnia  Assessment & Plan  · Restarted Zolpidem on 06/15    Hyperlipidemia  Assessment & Plan  · Continue Zetia 10 mg QD      Fibromyalgia  Assessment & Plan  · On gabapentin 300 mg x2 QHS  · Restarted gabapentin on 06/15- decreased to 100 mg QD    Allergic rhinitis  Assessment & Plan  · Fluticasone 2 sprays QD  · Restarted Claritin    Essential hypertension  Assessment & Plan  · Home meds: Toprol 100 mg QD, Cozaar 75 mg QD    Plan:   · Decreased Cozaar to 50 mg daily  · Toprol 125 mg b i d  (increased to previous home dosage as per Cardiology recommendations)  · Monitor vitals    Elevated serum creatinine-resolved as of 6/17/2022  Assessment & Plan  · Noted s/p Lasix IV 20 mg X1 in the ED  · In setting of CKD    Plan:   · Monitor Cr level in setting of IV diuresis  · Encourage PO intake    Medical Problems             Resolved Problems  Date Reviewed: 6/7/2022          Resolved    Hyponatremia 6/17/2022     Resolved by  Jabier Osei MD    Elevated serum creatinine 6/17/2022     Resolved by  Jabier Osei MD              Discharging Resident: Jabier Osei MD  Discharging Attending: Sunshine Breaux MD  PCP: Verenice Domingo MD  Admission Date:   Admission Orders (From admission, onward)     Ordered        06/13/22 [facility]  Inpatient Admission  Once                      Discharge Date: 06/17/22    Consultations During Hospital Stay:  · Cardiology    Procedures Performed:   · None     Significant Findings / Test Results:   CT head wo contrast   Final Result by Marcelo Lozada MD (06/15 1930)      No acute intracranial abnormality    Unchanged left frontal extra-axial mass, which was characterized as a probable meningioma on prior MRI  Workstation performed: [id number]         XR chest 1 view portable   Final Result by Miguel Asencio MD (06/13 2124)      Small bilateral pleural effusions with extensive bibasilar atelectasis and/or infiltrates are seen, right greater than left  Workstation performed: [id number]           Echo, 06/15/2022:    Left Ventricle: Left ventricular cavity size is normal  Wall thickness is increased  The left ventricular ejection fraction is 60%  Systolic function is normal  Wall motion is normal     Right Ventricle: Right ventricular cavity size is normal  Systolic function is normal     Left Atrium: The atrium is dilated    Incidental Findings:   · Hyponatremia on admission    Test Results Pending at Discharge (will require follow up): · None     Outpatient Tests Requested:  · Follow-up with PCP within 1 week of discharge    Complications:  None    Reason for Admission:  Acute heart failure exacerbation, likely secondary to uncontrolled AFib, hyponatremia  Hospital Course:   Trevon Jack is a 67 y o  female patient symptomatic persistent atrial fibrillation, s/p PVI (Feb 6520) 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) and recently mild reduction in LV systolic function presumed to be tachycardia related   who originally presented to the hospital on 6/13/2022 due to progressive symptoms of dyspnea at rest and with exertion and hyponatremia (125 sodium level upon presentation)  She was following closely with outpatient cardiology team  She had seen Dr Adithya Acuña at [facility] for a telemedicine visit and she was scheduled to have a repeat PVI on 06/14/2022 with Dr Adithya Acuña  On presentation, patient had elevated NT proBNP level, CXR showed pulmonary vascular congestion, small bilateral pleural effusions  Repeat echo showed preserved EF    Patient clinically improved after receiving IV diuresis; etiology of her heart failure was determined to be likely secondary to uncontrolled Afib  She is to be discharged home on the following regimen: Cozaar 50 mg daily, digoxin 125 mcg daily, amiodarone 200mg and metoprolol succinate 125mg twice a day as well as eliquis 5 mg twice a day  Patient's hyponatremia also resolved throughout the course of her hospitalization without fluids  Patient is to follow-up with cardiology team outpatient on 06/20/2022  She is also advised to follow-up with PCP within 1 week of discharge  Please see above list of diagnoses and related plan for additional information  Condition at Discharge: good    Discharge Day Visit / Exam:   Subjective:  Patient seen and examined at bedside  No significant events overnight  Denies chest pain, pnd, orthopnea, abdominal pain, nausea vomiting, fever, chills, headache, dizziness or palpitations  Vitals: Blood Pressure: 154/92 (06/17/22 1450)  Pulse: 72 (06/17/22 1450)  Temperature: 98 3 °F (36 8 °C) (06/17/22 1450)  Temp Source: Oral (06/15/22 2215)  Respirations: 17 (06/17/22 1450)  Height: 5' 2" (157 5 cm) (06/15/22 0915)  Weight - Scale: 72 9 kg (160 lb 11 5 oz) (06/16/22 0600)  SpO2: 96 % (06/17/22 1450)    Physical Exam   Vitals reviewed  General Examination:  Sitting in chair, pleasant, cooperative   HEENT: Normocephalic, Atraumatic  Extraocular movements intact, PERRLA  CVS: S1, S2 noted  No JVD  Irregularly irregular rhythm  Lungs:  Normal effort  No conversational dyspnea  Abdomen: Soft, normal bowel sounds  Non distended, non tender  Ext:  Trace pitting edema bilaterally   Psych: Though Process - logical    Skin: No bleeding/bruising noted  Neuro: A, Ox3  Follows simple, 3 steps commands  Appropriate antigravity strength in all 4 extremities proximally, distally  Discussion with Family: Updated  (daughter) via phone      Discharge instructions/Information to patient and family: See after visit summary for information provided to patient and family  Provisions for Follow-Up Care:  See after visit summary for information related to follow-up care and any pertinent home health orders  Disposition:   Home    Planned Readmission:  None    Discharge Medications:  See after visit summary for reconciled discharge medications provided to patient and/or family        **Please Note: This note may have been constructed using a voice recognition system**

## 2022-06-17 NOTE — ASSESSMENT & PLAN NOTE
· MRI brain in 4/22 demonstrated meningioma  · CTH, 6/15:  No acute changes  · Follows w/ Neurosurgery outpatient ; continue follow-up as scheduled post discharge    · Unlikely to be causing syx of imbalance, confusion

## 2022-06-17 NOTE — QUICK NOTE
I spoke to this patient's daughter, Ms Pietro Hudson, over the phone to provide a comprehensive clinical update  I answered all of her questions and addressed all of her concerns to the best of my ability and to her satisfaction  Patient's daughter was updated regarding her clinical status plan discharge, medication changes in detail  She verbalized understanding

## 2022-06-19 NOTE — PROGRESS NOTES
Cardiology Follow Up    Evon Lexi  1945  [id number]  [facility] [place] [id number] [facility]  [phone]    1  Persistent atrial fibrillation (HCC)  POCT ECG    metoprolol succinate (TOPROL-XL) 25 mg 24 hr tablet    digoxin (LANOXIN) 0 125 mg tablet    Digoxin level    furosemide (LASIX) 20 mg tablet   2  Chronic diastolic heart failure (HCC)  metoprolol succinate (TOPROL-XL) 25 mg 24 hr tablet   3  Essential hypertension  metoprolol succinate (TOPROL-XL) 25 mg 24 hr tablet   4  Mixed hyperlipidemia  digoxin (LANOXIN) 0 125 mg tablet       Interval History:  Ms Dwayne Mendoza was admitted to [facility] on 6/13 - 6/17/22 with acute heart failure  Lela Liu presented to the ED with 1 5 weeks of worsening shortness of breath and LE edema  She was found to be hyponatremic sodium 125  She was treated with one dose of IV Lasix 20mg  Creat increased from 1 08 to 1  29      6/15/22 TTE:  Left ventricle cavity size normal wall thickness increased LVEF 04% systolic function normal wall motion normal   Right ventricular cavity size normal systolic function normal   Left atrium dilated  It was felt her acute on chronic heart failure was provoked by atrial fibrillation with RVR 24 hour telemetry review showed heart rate ranging upper 90s to 110s at rest with exertion 120-130  Metoprolol succinate was up titrated she continue on amiodarone 200 mg daily  She was loaded to digoxin and placed on digoxin daily  Hyponatremia improved with diuresis  Discharge weight 160 lb        Ms Dwayne Mendoza presents to our office for a recent hospitalization follow up visit  She is accompanied by her daughter Celeste Ellington who is assisting her with care  Lela Noe admits to a chronic dry cough since she had COVID in  January  Her daughter states Lela Liu was never diagnosed with COVID    Lela Liu admits to shortness of breath with over doing activity  She denies lightheadedness or dizziness  Walking with a cane  Weight in the office 159 pounds  HPI:  Paroxysmal atrial fibrillation [id number], on Eliquis 5mg BID for stroke prevention,  3/12/21 sp cryoablation with pulmonary vein isolation, DCCV from AF to NSR  Placed on Flecainide post procedure  On 1/06/22 Ms Ant Rodríguez underwent DCCV single biphasic shock 200J to NSR   ,  SSS,  3/15/21 Sp MDT dual chamber PPM insertion ,   Hypertension  Chronic HFpEF LVEF 55% by TTE 1/04/21   Mild to mod TR per TTE 1/04/21   Hyperlipidemia 4/01/21 , , HDL 66, LDL 88   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  sp Lobectomy  10/27/21 TSH 0 975  2016 Sleep study negative for sleep apnea   Colonoscopy 8/2021 11/09/21 PFT"s : FEV1/FVC ratio 81%, FVC 2 67 L 103% predicted, FEV1 2 18 L 105% predicted,   Mildly reduced diffusion capacity  Allergies: Sotalol- prolonged QT leading to  torsades de pointes     4/26/22 MRI of the brain showed  Extra-axial mass within the inferior lateral aspect of the left anterior cranial fossa  Mild adjacent dural thickening and enhancement seen extending inferiorly into the middle cranial fossa and superiorly into the dura of the anterior frontal vertex    Findings suggestive of meningioma  Patient Active Problem List   Diagnosis    Essential hypertension    Allergic rhinitis    Fibromyalgia    Hyperlipidemia    Insomnia    Lumbar spondylosis    Lumbar stenosis    Osteoarthrosis, hand    Osteoarthritis of knee    Osteopenia    Paroxysmal atrial fibrillation (HCC)    Peripheral neuropathy    Restless legs syndrome    TMJ syndrome    Vitamin D deficiency    Osteoarthritis of shoulder    Carpal tunnel syndrome on right    Arthritis of both acromioclavicular joints    Chronic rhinitis    Chronic diastolic CHF (congestive heart failure) ([address]    Malignant neoplasm of thyroid gland ([address] )    Current use of long term anticoagulation    S/P placement of cardiac pacemaker    Tremors of nervous system    Hx of diplopia    Hurthle cell adenoma of thyroid    MGUS (monoclonal gammopathy of unknown significance)    Vasomotor rhinitis    Chronic tension-type headache, not intractable    Stage 3b chronic kidney disease ([facility]    Meningioma (HCC)    Continuous opioid dependence ([facility] )    Acute heart failure (HCC)    Hypomagnesemia    Hyperbilirubinemia    Confusion    Imbalance     Past Medical History:   Diagnosis Date    Actinic keratosis     last assessed - [id number]    Arthritis     Atrial fibrillation with rapid ventricular response (HCC)     last assessed - [id number]    Basal cell carcinoma     Benign colon polyp     last assessed - [id number]    Disease of thyroid gland     Effusion of knee joint right     Resolved - [id number]    Esophageal reflux     Fibromyalgia     last assessed - [id number]    Fibromyalgia, primary     GERD (gastroesophageal reflux disease)     Hypertension     Palpitations     last assessed - [id number]    Peroneal tendonitis, right     last assessed - [id number]    Right lumbar radiculopathy 3/17/2016    Thyroid cancer (HCC)     Thyroid nodule     Trochanteric bursitis of left hip 3/9/2018     Social History     Socioeconomic History    Marital status:       Spouse name: Not on file    Number of children: Not on file    Years of education: Not on file    Highest education level: Not on file   Occupational History    Not on file   Tobacco Use    Smoking status: Never Smoker    Smokeless tobacco: Never Used   Vaping Use    Vaping Use: Never used   Substance and Sexual Activity    Alcohol use: Not Currently     Comment: occosional - every 3 months    Drug use: Never    Sexual activity: Not Currently   Other Topics Concern    Not on file   Social History Narrative    Not on file     Social Determinants of Health     Financial Resource Strain: Not on file Food Insecurity: Not on file   Transportation Needs: No Transportation Needs    Lack of Transportation (Medical): No    Lack of Transportation (Non-Medical):  No   Physical Activity: Not on file   Stress: Not on file   Social Connections: Not on file   Intimate Partner Violence: Not on file   Housing Stability: Not on file      Family History   Problem Relation Age of Onset    Heart disease Mother     Diabetes Mother     Heart disease Father     Coronary artery disease Father     Stroke Father         cerebrovascular accident    Heart attack Father         myocardial infarction    Sudden death Father         scd    Other Family         Back disorder    Coronary artery disease Family     Neuropathy Family     Osteoporosis Family     No Known Problems Daughter     No Known Problems Maternal Grandmother     No Known Problems Maternal Grandfather     No Known Problems Paternal Grandmother     No Known Problems Paternal Grandfather     Cancer Maternal Uncle     Breast cancer Maternal Aunt 72    No Known Problems Son     No Known Problems Maternal Aunt     No Known Problems Maternal Aunt     No Known Problems Maternal Aunt     No Known Problems Paternal Aunt     No Known Problems Paternal Aunt     Anuerysm Neg Hx     Clotting disorder Neg Hx     Arrhythmia Neg Hx     Heart failure Neg Hx      Past Surgical History:   Procedure Laterality Date    CATARACT EXTRACTION Bilateral     COLONOSCOPY  03/2018    EYE SURGERY      HYSTERECTOMY      JOINT REPLACEMENT Left     knee    RI REVISE MEDIAN N/CARPAL TUNNEL SURG Right 11/14/2019    Procedure: RELEASE CARPAL TUNNEL;  Surgeon: Fredi Rutledge MD;  [facility];  Service: Orthopedics    RI THYROID LOBECTOMY,UNILAT Left 12/16/2020    Procedure: Left THYROID LOBECTOMY;  Surgeon: Loy Coombs MD;  Location[facility];  Service: ENT    RECTAL POLYPECTOMY      REPLACEMENT TOTAL KNEE Left     last assessed - [id number]    TONSILLECTOMY  TOTAL ABDOMINAL HYSTERECTOMY      TUBAL LIGATION      US GUIDED THYROID BIOPSY  10/14/2020       Current Outpatient Medications:     amiodarone 200 mg tablet, Take 1 tablet (200 mg total) by mouth daily, Disp: 90 tablet, Rfl: 2    apixaban (ELIQUIS) 5 mg, Take 1 tablet (5 mg total) by mouth 2 (two) times a day, Disp: 56 tablet, Rfl: 0    ascorbic acid (VITAMIN C) 500 mg tablet, Take 500 mg by mouth daily , Disp: , Rfl:     Cholecalciferol 50 MCG (2000 UT) CAPS, Take 2,000 Units by mouth 2 (two) times a day  , Disp: , Rfl:     Chromium Picolinate (CHROMIUM PICOLATE PO), Take 1 tablet by mouth daily, Disp: , Rfl:     digoxin (LANOXIN) 0 125 mg tablet, Take 1 tablet (125 mcg total) by mouth daily for 15 days, Disp: 15 tablet, Rfl: 0    DULoxetine (CYMBALTA) 30 mg delayed release capsule, Take 1 capsule (30 mg total) by mouth daily after breakfast, Disp: 30 capsule, Rfl: 2    ezetimibe (ZETIA) 10 mg tablet, Take 1 tablet (10 mg total) by mouth daily, Disp: 90 tablet, Rfl: 3    famotidine (PEPCID) 20 mg tablet, Take 20 mg by mouth every other day  , Disp: , Rfl:     gabapentin (NEURONTIN) 100 mg capsule, Take 1 capsule (100 mg total) by mouth daily at bedtime for 15 days, Disp: 15 capsule, Rfl: 0    ipratropium (ATROVENT) 0 03 % nasal spray, 2 sprays into each nostril 3 (three) times a day Begin once per day, then progress to twice per day  Progress to three times a day as tolerated  , Disp: 90 mL, Rfl: 3    lidocaine (XYLOCAINE) 5 % ointment, Apply topically as needed for mild pain, Disp: 35 44 g, Rfl: 0    losartan (COZAAR) 50 mg tablet, Take one  50 mg tablet in addition to a 25 mg tablet twice a day, Disp: 180 tablet, Rfl: 1    metoprolol succinate (TOPROL-XL) 25 mg 24 hr tablet, Take 5 tablets (125 mg total) by mouth every 12 (twelve) hours for 15 days, Disp: 150 tablet, Rfl: 0    rOPINIRole (REQUIP) 0 5 mg tablet, Take 1 tablet (0 5 mg total) by mouth daily at bedtime, Disp: 90 tablet, Rfl: 3   traMADol (ULTRAM) 50 mg tablet, Take 1 tablet (50 mg total) by mouth 2 (two) times a day as needed for moderate pain, Disp: 60 tablet, Rfl: 3    TURMERIC PO, Take 1 capsule by mouth 2 (two) times a day, Disp: , Rfl:     zolpidem (AMBIEN) 10 mg tablet, Take 1 tablet (10 mg total) by mouth daily at bedtime as needed for sleep, Disp: 90 tablet, Rfl: 1  Allergies   Allergen Reactions    Sotalol Other (See Comments)     Prolonged QTc leading to torsades de pointes     Penicillins Other (See Comments)     As a child calcium deposit in the arm     Ace Inhibitors GI Intolerance     Did feel well on it       Labs:  Admission on 06/13/2022, Discharged on 06/17/2022   Component Date Value    WBC 06/13/2022 6 73     RBC 06/13/2022 3 83     Hemoglobin 06/13/2022 12 1     Hematocrit 06/13/2022 36 4     MCV 06/13/2022 95     MCH 06/13/2022 31 6     MCHC 06/13/2022 33 2     RDW 06/13/2022 13 3     MPV 06/13/2022 10 0     Platelets 07/53/7308 275     nRBC 06/13/2022 0     Neutrophils Relative 06/13/2022 64     Immat GRANS % 06/13/2022 0     Lymphocytes Relative 06/13/2022 21     Monocytes Relative 06/13/2022 11     Eosinophils Relative 06/13/2022 3     Basophils Relative 06/13/2022 1     Neutrophils Absolute 06/13/2022 4 30     Immature Grans Absolute 06/13/2022 0 03     Lymphocytes Absolute 06/13/2022 1 38     Monocytes Absolute 06/13/2022 0 77     Eosinophils Absolute 06/13/2022 0 19     Basophils Absolute 06/13/2022 0 06     Sodium 06/13/2022 128 (A)    Potassium 06/13/2022 4 1     Chloride 06/13/2022 93 (A)    CO2 06/13/2022 25     ANION GAP 06/13/2022 10     BUN 06/13/2022 23     Creatinine 06/13/2022 1 34 (A)    Glucose 06/13/2022 129     Calcium 06/13/2022 9 1     AST 06/13/2022 38     ALT 06/13/2022 50     Alkaline Phosphatase 06/13/2022 68     Total Protein 06/13/2022 6 6     Albumin 06/13/2022 3 8     Total Bilirubin 06/13/2022 1 66 (A)    eGFR 06/13/2022 38     hs TnI 0hr 06/13/2022 7     hs TnI 2hr 06/13/2022 6     Delta 2hr hsTnI 06/13/2022 -1     Magnesium 06/13/2022 1 7 (A)    BNP 06/13/2022 928 (A)    TSH 3RD GENERATON 06/13/2022 2 006     hs TnI 4hr 06/13/2022 6     Delta 4hr hsTnI 06/13/2022 -1     Bilirubin, Direct 06/13/2022 0 38 (A)    Sodium 06/13/2022 128 (A)    Potassium 06/13/2022 3 7     Chloride 06/13/2022 91 (A)    CO2 06/13/2022 27     ANION GAP 06/13/2022 10     BUN 06/13/2022 20     Creatinine 06/13/2022 1 14     Glucose 06/13/2022 121     Calcium 06/13/2022 9 3     eGFR 06/13/2022 46     Procalcitonin 06/13/2022 0 13     Sodium 06/14/2022 128 (A)    Potassium 06/14/2022 3 5     Chloride 06/14/2022 92 (A)    CO2 06/14/2022 28     ANION GAP 06/14/2022 8     BUN 06/14/2022 20     Creatinine 06/14/2022 1 02     Glucose 06/14/2022 122     Calcium 06/14/2022 9 1     eGFR 06/14/2022 53     Sodium 06/14/2022 129 (A)    Potassium 06/14/2022 3 4 (A)    Chloride 06/14/2022 91 (A)    CO2 06/14/2022 27     ANION GAP 06/14/2022 11     BUN 06/14/2022 20     Creatinine 06/14/2022 1 10     Glucose 06/14/2022 134     Calcium 06/14/2022 9 2     eGFR 06/14/2022 48     LVPWd 06/15/2022 0 80     IVSd 06/15/2022 9 56     LV DIASTOLIC VOLUME (MOD* 23/30/5467 117     LEFT VENTRICLE SYSTOLIC * 16/88/2634 38     Left ventricular stroke * 06/15/2022 79 00     EF 06/15/2022 51     A4C EF 06/15/2022 62     LVIDd 06/15/2022 5 00     IVS 06/15/2022 0 8     LVIDS 06/15/2022 3 10     FS 06/15/2022 38     LV Systolic Volume (BP) 27/80/8615 31     LV Diastolic Volume (BP) 18/86/1588 63     LVSV, 2D 06/15/2022 79     LV EF 06/15/2022 60     Sodium 06/14/2022 131 (A)    Potassium 06/14/2022 3 4 (A)    Chloride 06/14/2022 90 (A)    CO2 06/14/2022 28     ANION GAP 06/14/2022 13     BUN 06/14/2022 24     Creatinine 06/14/2022 1 19     Glucose 06/14/2022 149 (A)    Calcium 06/14/2022 9 3     eGFR 06/14/2022 44     Ventricular Rate 06/13/2022 85     Atrial Rate 06/13/2022 89     W[place] Interval 06/13/2022 0     QRSD Interval 06/13/2022 106     QT Interval 06/13/2022 340     QTC Interval 06/13/2022 405     QRS Axis 06/13/2022 133     T Wave Axis 06/13/2022 -78     Sodium 06/15/2022 135     Potassium 06/15/2022 3 6     Chloride 06/15/2022 94 (A)    CO2 06/15/2022 30     ANION GAP 06/15/2022 11     BUN 06/15/2022 24     Creatinine 06/15/2022 1 07     Glucose 06/15/2022 134     Calcium 06/15/2022 9 4     AST 06/15/2022 41 (A)    ALT 06/15/2022 51     Alkaline Phosphatase 06/15/2022 63     Total Protein 06/15/2022 6 5     Albumin 06/15/2022 3 7     Total Bilirubin 06/15/2022 1 85 (A)    eGFR 06/15/2022 50     Total Bilirubin 06/15/2022 1 60 (A)    Bilirubin, Direct 06/15/2022 0 37 (A)    Vitamin B-12 06/15/2022 2,172 (A)    Folate 06/15/2022 >20 0 (A)    Color, UA 06/16/2022 Yellow     Clarity, UA 06/16/2022 Clear     Specific Gravity, UA 06/16/2022 1 010     pH, UA 06/16/2022 6 5     Leukocytes, UA 06/16/2022 Trace (A)    Nitrite, UA 06/16/2022 Negative     Protein, UA 06/16/2022 Negative     Glucose, UA 06/16/2022 Negative     Ketones, UA 06/16/2022 Negative     Urobilinogen, UA 06/16/2022 0 2     Bilirubin, UA 06/16/2022 Negative     Blood, UA 06/16/2022 Negative     Magnesium 06/15/2022 1 9     Potassium 06/15/2022 3 5     Sodium 06/15/2022 133 (A)    Potassium 06/15/2022 3 5     Chloride 06/15/2022 92 (A)    CO2 06/15/2022 28     ANION GAP 06/15/2022 13     BUN 06/15/2022 30 (A)    Creatinine 06/15/2022 1 33 (A)    Glucose 06/15/2022 200 (A)    Calcium 06/15/2022 9 7     eGFR 06/15/2022 38     Sodium 06/16/2022 136     Potassium 06/16/2022 3 7     Chloride 06/16/2022 96     CO2 06/16/2022 30     ANION GAP 06/16/2022 10     BUN 06/16/2022 25     Creatinine 06/16/2022 1 18     Glucose 06/16/2022 114     Calcium 06/16/2022 9 7     eGFR 06/16/2022 44     WBC 06/16/2022 7 37     RBC 06/16/2022 4 59     Hemoglobin 06/16/2022 14 3     Hematocrit 06/16/2022 43 9     MCV 06/16/2022 96     MCH 06/16/2022 31 2     MCHC 06/16/2022 32 6     RDW 06/16/2022 13 5     MPV 06/16/2022 9 9     Platelets 45/66/3983 306     nRBC 06/16/2022 0     Neutrophils Relative 06/16/2022 62     Immat GRANS % 06/16/2022 0     Lymphocytes Relative 06/16/2022 18     Monocytes Relative 06/16/2022 13 (A)    Eosinophils Relative 06/16/2022 6     Basophils Relative 06/16/2022 1     Neutrophils Absolute 06/16/2022 4 56     Immature Grans Absolute 06/16/2022 0 03     Lymphocytes Absolute 06/16/2022 1 33     Monocytes Absolute 06/16/2022 0 96     Eosinophils Absolute 06/16/2022 0 41     Basophils Absolute 06/16/2022 0 08     RBC, UA 06/16/2022 0-1     WBC, UA 06/16/2022 0-1     Epithelial Cells 06/16/2022 Occasional     Bacteria, UA 06/16/2022 Occasional    Office Visit on 06/07/2022   Component Date Value    Sodium 06/13/2022 125 (A)    Potassium 06/13/2022 4 4     Chloride 06/13/2022 94 (A)    CO2 06/13/2022 27     ANION GAP 06/13/2022 4     BUN 06/13/2022 20     Creatinine 06/13/2022 1 08     Glucose, Fasting 06/13/2022 117 (A)    Calcium 06/13/2022 9 5     AST 06/13/2022 51 (A)    ALT 06/13/2022 70     Alkaline Phosphatase 06/13/2022 76     Total Protein 06/13/2022 7 2     Albumin 06/13/2022 3 5     Total Bilirubin 06/13/2022 1 79 (A)    eGFR 06/13/2022 49     WBC 06/13/2022 6 95     RBC 06/13/2022 4 04     Hemoglobin 06/13/2022 12 7     Hematocrit 06/13/2022 38 9     MCV 06/13/2022 96     MCH 06/13/2022 31 4     MCHC 06/13/2022 32 6     RDW 06/13/2022 13 4     MPV 06/13/2022 10 5     Platelets 77/69/0616 289     nRBC 06/13/2022 0     Neutrophils Relative 06/13/2022 70     Immat GRANS % 06/13/2022 0     Lymphocytes Relative 06/13/2022 16     Monocytes Relative 06/13/2022 10     Eosinophils Relative 06/13/2022 3     Basophils Relative 06/13/2022 1     Neutrophils Absolute 06/13/2022 4 88     Immature Grans Absolute 06/13/2022 0 02     Lymphocytes Absolute 06/13/2022 1 13     Monocytes Absolute 06/13/2022 0 66     Eosinophils Absolute 06/13/2022 0 19     Basophils Absolute 06/13/2022 0 07     TSH 3RD GENERATON 06/13/2022 1 640    Hospital Outpatient Visit on 05/16/2022   Component Date Value    LA size 05/16/2022 4 8     Triscuspid Valve Regurgi* 05/16/2022 57 0     Tricuspid valve peak reg* 05/16/2022 4 35     LVPWd 05/16/2022 1 20     Left Atrium Area-systoli* 05/16/2022 22 5     Left Atrium Area-systoli* 05/16/2022 26 1     MV E' Tissue Velocity Se* 05/16/2022 8     TR Peak Chaz 05/16/2022 3 8     IVSd 05/16/2022 6 11     LV DIASTOLIC VOLUME (MOD* 73/81/2022 104     LEFT VENTRICLE SYSTOLIC * 18/58/0106 52     Left ventricular stroke * 05/16/2022 53 00     EF 05/16/2022 46     A4C EF 05/16/2022 48     LA length (A2C) 05/16/2022 5 70     LVIDd 05/16/2022 4 70     IVS 05/16/2022 1 2     LVIDS 05/16/2022 3 50     FS 05/16/2022 26     Asc Ao 05/16/2022 2 6     Ao root 05/16/2022 2 80     RVID d 05/16/2022 4 1     MV valve area p 1/2 meth* 05/16/2022 5 24     E wave deceleration time 05/16/2022 145     MV Peak E Chaz 05/16/2022 108     MV Peak A Chaz 05/16/2022 0 4     LV Systolic Volume (BP) 18/53/3266 41     LV Diastolic Volume (BP) 87/75/4754 76     RAA A4C 05/16/2022 20 1     MV stenosis pressure 1/2* 05/16/2022 42     LVSV, 2D 05/16/2022 53     LV EF 05/16/2022 45     Est  RA pres 05/16/2022 10 0     Right Ventricular Peak S* 05/16/2022 67 00      Imaging: XR chest 1 view portable    Result Date: 6/13/2022  Narrative: CHEST INDICATION:   SOB  COMPARISON:  8/5/2021 EXAM PERFORMED/VIEWS:  XR CHEST PORTABLE FINDINGS: Cardiomediastinal silhouette appears unremarkable  Left chest single lead cardiac pacer device and mild aortic arch calcifications again noted  There is no pneumothorax    Small bilateral pleural effusions with extensive bibasilar atelectasis and/or infiltrates are seen, right greater than left  Osseous structures appear within normal limits for patient age  Impression: Small bilateral pleural effusions with extensive bibasilar atelectasis and/or infiltrates are seen, right greater than left  Workstation performed: [id number]     CT head wo contrast    Result Date: 6/15/2022  Narrative: CT BRAIN - WITHOUT CONTRAST INDICATION:   Mental status change, persistent or worsening altered mental status  COMPARISON: CT 3/13/2022  MRI 4/26/2022  TECHNIQUE:  CT examination of the brain was performed  In addition to axial images, sagittal and coronal 2D reformatted images were created and submitted for interpretation  Radiation dose length product (DLP) for this visit:  898 mGy-cm   This examination, like all CT scans performed in the [facility], was performed utilizing techniques to minimize radiation dose exposure, including the use of iterative reconstruction and automated exposure control  IMAGE QUALITY:  Diagnostic  FINDINGS: PARENCHYMA: Decreased attenuation is noted in periventricular and subcortical white matter demonstrating an appearance that is statistically most likely to represent mild microangiopathic change  Stable small coarse calcification in the left frontotemporal region laterally on image 2/16  Unchanged isodense extra-axial mass with small calcification in the left frontal region laterally measuring 3 0 x 1 2 cm on image 2/62  Unchanged mass effect on the underlying frontotemporal sulci  No evidence of acute infarction  No acute parenchymal  hemorrhage  VENTRICLES AND EXTRA-AXIAL SPACES: Unchanged left frontal extra-axial mass  Normal for the patient's age  VISUALIZED ORBITS AND PARANASAL SINUSES:  Unremarkable  CALVARIUM AND EXTRACRANIAL SOFT TISSUES:  Normal      Impression: No acute intracranial abnormality    Unchanged left frontal extra-axial mass, which was characterized as a probable meningioma on prior MRI  Workstation performed: [id number]     Echo follow up/limited w/ contrast if indicated    Result Date: 6/15/2022  Narrative: Hortencia Coyle  Left Ventricle: Left ventricular cavity size is normal  Wall thickness is increased  The left ventricular ejection fraction is 60%  Systolic function is normal  Wall motion is normal    Right Ventricle: Right ventricular cavity size is normal  Systolic function is normal    Left Atrium: The atrium is dilated  Review of Systems:  Review of Systems   Respiratory: Positive for cough  Dry    Musculoskeletal: Positive for arthralgias, gait problem and myalgias  All other systems reviewed and are negative  Physical Exam:  Physical Exam  Vitals reviewed  Constitutional:       Appearance: Normal appearance  HENT:      Head: Normocephalic  Eyes:      Pupils: Pupils are equal, round, and reactive to light  Neck:      Comments: No JVD   Cardiovascular:      Rate and Rhythm: Normal rate and regular rhythm  Pulses: Normal pulses  Heart sounds: Normal heart sounds  Pulmonary:      Effort: Pulmonary effort is normal       Breath sounds: Normal breath sounds  Abdominal:      General: Bowel sounds are normal       Palpations: Abdomen is soft  Musculoskeletal:         General: Normal range of motion  Cervical back: Normal range of motion and neck supple  Skin:     General: Skin is warm  Capillary Refill: Capillary refill takes less than 2 seconds  Neurological:      General: No focal deficit present  Mental Status: She is alert and oriented to person, place, and time  Psychiatric:         Mood and Affect: Mood normal          Behavior: Behavior normal          Discussion/Summary:  1  Persistent atrial fibrillation [id number]=2 ( female, Age, CHF, HTN ) on Eliquis 5mg BID for stroke prevention,rate controlled on Metoprolol succinate 125m g Q12 hours, Digoxin 125mcg daily and Amiodarone 200mg daily    hx on  3/12/21 sp cryoablation with pulmonary vein isolation, DCCV from AF to NSR  Placed on Flecainide post procedure, 1/06/22 Ms Chey Banks underwent DCCV single biphasic shock 200J to NSR   SSS,  3/15/21 Sp MDT dual chamber PPM insertion  Plan to undergo repeat ablation at the [facility] ([place] by Dr Opal Abreu in the beginning of August   Digoxin level  Ventricular response controlled 76 BPM   Now with a dry chronic cough  Amiodarone not a good long term medication and will have been on it 6 months in August 2  Chronic HFpEF LVEF 60% by TTE  6/15/22  NYHA class III stage C - on PE eu volemic and compensated, weight in the office 159 pounds, HR and BP controlled, continue on  Metoprolol succinate 125 mg q 12 hours, losartan 50 mg daily, digoxin 120 mcg daily I have ordered Lasix 20 mg p r n  for weight gain of 3 lb in 1 day worsening shortness of breath or lower extremity edema  She was instructed to eat a banana if she is taking Lasix  Continue on 2 g sodium diet 1800 cc fluid restriction and daily weights  3  Hypertension RUE sitting 130/72 continue on  Metoprolol succinate 125 mg q 12 hours, losartan 50 mg daily  4   Hyperlipidemia 4/01/21 , , HDL 66, LDL 88-  Continue on Zetia 10 mg daily-diet

## 2022-06-20 ENCOUNTER — OFFICE VISIT (OUTPATIENT)
Dept: CARDIOLOGY CLINIC | Facility: CLINIC | Age: 77
End: 2022-06-20
Payer: MEDICARE

## 2022-06-20 ENCOUNTER — TELEPHONE (OUTPATIENT)
Dept: FAMILY MEDICINE CLINIC | Facility: CLINIC | Age: 77
End: 2022-06-20

## 2022-06-20 ENCOUNTER — TRANSITIONAL CARE MANAGEMENT (OUTPATIENT)
Dept: FAMILY MEDICINE CLINIC | Facility: CLINIC | Age: 77
End: 2022-06-20

## 2022-06-20 ENCOUNTER — IN-CLINIC DEVICE VISIT (OUTPATIENT)
Dept: CARDIOLOGY CLINIC | Facility: CLINIC | Age: 77
End: 2022-06-20
Payer: MEDICARE

## 2022-06-20 VITALS
HEART RATE: 76 BPM | HEIGHT: 62 IN | SYSTOLIC BLOOD PRESSURE: 140 MMHG | DIASTOLIC BLOOD PRESSURE: 82 MMHG | BODY MASS INDEX: 29.37 KG/M2 | WEIGHT: 159.6 LBS

## 2022-06-20 DIAGNOSIS — Z95.0 CARDIAC PACEMAKER IN SITU: Primary | ICD-10-CM

## 2022-06-20 DIAGNOSIS — I50.32 CHRONIC DIASTOLIC HEART FAILURE (HCC): ICD-10-CM

## 2022-06-20 DIAGNOSIS — I48.19 PERSISTENT ATRIAL FIBRILLATION (HCC): Primary | ICD-10-CM

## 2022-06-20 DIAGNOSIS — E78.2 MIXED HYPERLIPIDEMIA: ICD-10-CM

## 2022-06-20 DIAGNOSIS — I10 ESSENTIAL HYPERTENSION: ICD-10-CM

## 2022-06-20 PROCEDURE — 93280 PM DEVICE PROGR EVAL DUAL: CPT | Performed by: INTERNAL MEDICINE

## 2022-06-20 PROCEDURE — 99215 OFFICE O/P EST HI 40 MIN: CPT | Performed by: NURSE PRACTITIONER

## 2022-06-20 PROCEDURE — 93000 ELECTROCARDIOGRAM COMPLETE: CPT | Performed by: NURSE PRACTITIONER

## 2022-06-20 RX ORDER — METOPROLOL SUCCINATE 25 MG/1
125 TABLET, EXTENDED RELEASE ORAL EVERY 12 HOURS SCHEDULED
Qty: 150 TABLET | Refills: 0 | Status: SHIPPED | OUTPATIENT
Start: 2022-06-20 | End: 2022-07-12

## 2022-06-20 RX ORDER — DIGOXIN 125 MCG
125 TABLET ORAL DAILY
Qty: 15 TABLET | Refills: 0 | Status: SHIPPED | OUTPATIENT
Start: 2022-06-20 | End: 2022-06-27 | Stop reason: SDUPTHER

## 2022-06-20 RX ORDER — FUROSEMIDE 20 MG/1
TABLET ORAL
Qty: 10 TABLET | Refills: 3 | Status: SHIPPED | OUTPATIENT
Start: 2022-06-20

## 2022-06-20 NOTE — PATIENT INSTRUCTIONS
Maintain a 2 gram daily sodium diet and 1500 ml daily fluid restriction  Check daily weights  If you gained 3 pounds in one day, 5 pounds in one week, or experience worsening shortness of breath or increasing lower leg swelling  Please call the heart failure office at [phone]    Please bring a  list of your current medications and daily weights to the office visit

## 2022-06-20 NOTE — PROGRESS NOTES
Results for orders placed or performed in visit on 06/20/22   Cardiac EP device report    Narrative    MDT-DUAL CHAMBER PPM (AAIR-DDDR MODE)/ ACTIVE SYSTEM IS MRI CONDITIONAL  DEVICE INTERROGATED IN THE [facility] OFFICE  BATTERY VOLTAGE ADEQUATE (12 9 YRS)  AP-3%, -9%  ALL LEAD PARAMETERS WITHIN NORMAL LIMITS  394 AF EPISODES TREATED W/ rATP (13 7% PACE TERMINATED) & 4 MONITORED AF EPISODES  PT CURRENTLY IN AFLUTTER  AF BURDEN-99 8%  PT ON ELIQUIS, AMIO, DIG & METOPROLOL  PT SEEN BY CC, CRNP TODAY  NORMAL DEVICE FUNCTION   GV

## 2022-06-21 ENCOUNTER — PATIENT OUTREACH (OUTPATIENT)
Dept: FAMILY MEDICINE CLINIC | Facility: CLINIC | Age: 77
End: 2022-06-21

## 2022-06-21 ENCOUNTER — TELEPHONE (OUTPATIENT)
Dept: FAMILY MEDICINE CLINIC | Facility: CLINIC | Age: 77
End: 2022-06-21

## 2022-06-21 NOTE — PROGRESS NOTES
Spoke with Mason Silverman  Explained who I am why I am calling  currently she is working with [address] [facility] and nursing are coming out  Daughter will drive her to appointments right now  Normally she drives herself  She has all of her medications  I will check back in two weeks

## 2022-06-21 NOTE — TELEPHONE ENCOUNTER
Pt recently hospitalized  Ricardo Colon is a PT from [facility] who saw this pt yesterday  When he entered the meds he discovered a Severity Level 2 Interaction between amiodarone and digoxin  Please contact Ricardo Colon if you want to continue these meds as ordered        [phone]

## 2022-06-23 ENCOUNTER — RA CDI HCC (OUTPATIENT)
Dept: OTHER | Facility: HOSPITAL | Age: 77
End: 2022-06-23

## 2022-06-23 NOTE — PROGRESS NOTES
I13 0  [facility] [[email or web address]] 75  coding opportunities          Chart Reviewed number of suggestions sent to Provider: 1     Patients Insurance     Medicare Insurance: [employer]

## 2022-06-27 ENCOUNTER — TELEPHONE (OUTPATIENT)
Dept: PAIN MEDICINE | Facility: CLINIC | Age: 77
End: 2022-06-27

## 2022-06-27 DIAGNOSIS — E78.2 MIXED HYPERLIPIDEMIA: ICD-10-CM

## 2022-06-27 DIAGNOSIS — I48.19 PERSISTENT ATRIAL FIBRILLATION (HCC): ICD-10-CM

## 2022-06-27 DIAGNOSIS — F33.0 MILD EPISODE OF RECURRENT MAJOR DEPRESSIVE DISORDER (HCC): ICD-10-CM

## 2022-06-27 DIAGNOSIS — M79.7 FIBROMYALGIA: ICD-10-CM

## 2022-06-27 DIAGNOSIS — G62.9 PERIPHERAL POLYNEUROPATHY: Primary | ICD-10-CM

## 2022-06-27 RX ORDER — GABAPENTIN 100 MG/1
100 CAPSULE ORAL
Qty: 15 CAPSULE | Refills: 0 | Status: CANCELLED | OUTPATIENT
Start: 2022-06-27 | End: 2022-07-12

## 2022-06-27 RX ORDER — DIGOXIN 125 MCG
125 TABLET ORAL DAILY
Qty: 30 TABLET | Refills: 3 | Status: SHIPPED | OUTPATIENT
Start: 2022-06-27 | End: 2022-07-05 | Stop reason: SDUPTHER

## 2022-06-27 RX ORDER — DULOXETIN HYDROCHLORIDE 30 MG/1
30 CAPSULE, DELAYED RELEASE ORAL
Qty: 30 CAPSULE | Refills: 2 | Status: SHIPPED | OUTPATIENT
Start: 2022-06-27

## 2022-06-27 RX ORDER — GABAPENTIN 300 MG/1
300 CAPSULE ORAL
Qty: 30 CAPSULE | Refills: 0 | Status: SHIPPED | OUTPATIENT
Start: 2022-06-27 | End: 2022-08-09 | Stop reason: SDUPTHER

## 2022-06-27 RX ORDER — DIGOXIN 125 MCG
125 TABLET ORAL DAILY
Qty: 15 TABLET | Refills: 0 | Status: CANCELLED | OUTPATIENT
Start: 2022-06-27 | End: 2022-07-12

## 2022-06-27 NOTE — TELEPHONE ENCOUNTER
Pt informed that on call provider sent a 1 month Rx for gabapentin 300 mg to take QHS  Pt given ov for 7/20 w/ Nilsa Severance for further eval of gibran

## 2022-06-27 NOTE — TELEPHONE ENCOUNTER
Patient was in hospital last week and they changed her medication she is requesting refill for 100 mg Gabapentin     Med refill   Name of medication: Gabapentin 100mg   Frequency: once a day   How many tablets left:0  Pharmacy: [place] [address] [place] [address]   Phone:  [phone]  Fax:  [phone]   Best call back # [phone]     Last office visit is 1/2021

## 2022-06-27 NOTE — TELEPHONE ENCOUNTER
Pt of Dr Esteban Ramos  RN s/w pt and she said she had been taking gabapentin 300 mg (2) at HS prescribed by our office  She was in the hospital last week and they changed it to 100 mg once a day  Pt said she doesn't know why they reduced the dosing but she was in for heart failure  Pt has enough pills up and including Sat  Her PCP told her told call our office for refill  Told pt FQ is out of office but I will reach out to on call provider about RF  Pt last seen in our office by [facility] on 1/12/2021  [facility] last sent Rx for 90 day supply with 4 refills on 3/5/2021  Pt has no pending ov  Are you willing to send any refills?

## 2022-06-27 NOTE — TELEPHONE ENCOUNTER
Heydi Lemon called back advised don't have remote system, he asked if give scales for CHF pt, advised if they don't have one, advised let us know

## 2022-06-27 NOTE — TELEPHONE ENCOUNTER
Celina Shirley from  [facility] called   Questioning if we have a telemetry monitoring system for bp,weight to tract pt    Called and left a message and advised don't have that current system, pt or home care would call into office with s/s    Left message on machine to call office with s/s or details on pt

## 2022-06-29 ENCOUNTER — OFFICE VISIT (OUTPATIENT)
Dept: FAMILY MEDICINE CLINIC | Facility: CLINIC | Age: 77
End: 2022-06-29
Payer: MEDICARE

## 2022-06-29 VITALS
DIASTOLIC BLOOD PRESSURE: 80 MMHG | WEIGHT: 160 LBS | BODY MASS INDEX: 29.44 KG/M2 | TEMPERATURE: 97.1 F | HEART RATE: 72 BPM | HEIGHT: 62 IN | RESPIRATION RATE: 16 BRPM | SYSTOLIC BLOOD PRESSURE: 146 MMHG

## 2022-06-29 DIAGNOSIS — D47.2 MGUS (MONOCLONAL GAMMOPATHY OF UNKNOWN SIGNIFICANCE): ICD-10-CM

## 2022-06-29 DIAGNOSIS — D32.9 MENINGIOMA (HCC): ICD-10-CM

## 2022-06-29 DIAGNOSIS — I50.21 ACUTE SYSTOLIC HEART FAILURE (HCC): Primary | ICD-10-CM

## 2022-06-29 DIAGNOSIS — I27.20 PULMONARY HYPERTENSION (HCC): ICD-10-CM

## 2022-06-29 DIAGNOSIS — I10 ESSENTIAL HYPERTENSION: ICD-10-CM

## 2022-06-29 DIAGNOSIS — N18.32 STAGE 3B CHRONIC KIDNEY DISEASE (HCC): ICD-10-CM

## 2022-06-29 DIAGNOSIS — Z95.0 S/P PLACEMENT OF CARDIAC PACEMAKER: ICD-10-CM

## 2022-06-29 DIAGNOSIS — C73 MALIGNANT NEOPLASM OF THYROID GLAND (HCC): ICD-10-CM

## 2022-06-29 DIAGNOSIS — E87.1 HYPONATREMIA: ICD-10-CM

## 2022-06-29 DIAGNOSIS — E83.42 HYPOMAGNESEMIA: ICD-10-CM

## 2022-06-29 DIAGNOSIS — I48.21 PERMANENT ATRIAL FIBRILLATION (HCC): ICD-10-CM

## 2022-06-29 PROBLEM — R26.89 IMBALANCE: Status: RESOLVED | Noted: 2022-06-15 | Resolved: 2022-06-29

## 2022-06-29 PROCEDURE — 99495 TRANSJ CARE MGMT MOD F2F 14D: CPT | Performed by: FAMILY MEDICINE

## 2022-06-29 RX ORDER — LOSARTAN POTASSIUM 25 MG/1
25 TABLET ORAL 2 TIMES DAILY
COMMUNITY

## 2022-06-29 NOTE — PROGRESS NOTES
TCM Call (since 5/29/2022)     Date and time call was made  6/20/2022  2:26 PM    Hospital care reviewed  Records reviewed    Patient was hospitialized at  [address] [place] [address]    Date of Admission  06/13/22    Date of discharge  06/17/22    Diagnosis  Acute heart failure ([facility] [address] )    Disposition  Home    Were the patients medications reviewed and updated  Yes    Current Symptoms  Fatigue; Weakness    Weakness severity  Moderate    Fatigue severity  Moderate      TCM Call (since 5/29/2022)     Post hospital issues  Reduced activity; Poor ADL (Activities of Daily Living) performance    Should patient be enrolled in anticoag monitoring? No    Scheduled for follow up? Yes    Did you obtain your prescribed medications  Yes    Do you need help managing your prescriptions or medications  Yes    Why type of assitance do you need  Daughter helps with medication adherence  Is transportation to your appointment needed  Yes    Specify why  No longer driving  I have advised the patient to call PCP with any new or worsening symptoms  [address]  Family; Children    The type of support provided  Emotional; Physical    Do you have social support  Yes, as much as I need    Are you recieving any outpatient services  Yes    What type of services  [facility]    Are you recieving home care services  Yes    Types of home care services  Home PT; Nurse visit    Are you using any community resources  No    Current waiver services  No    Have you fallen in the last 12 months  Yes    How many times  Once    Interperter language line needed  No    Counseling  Family        Assessment/Plan:     Diagnoses and all orders for this visit:    Acute systolic heart failure (HCC)    Permanent atrial fibrillation ([facility] [address] )    Pulmonary hypertension ([facility] [address] )    Hyponatremia  -     Basic metabolic panel;  Future    Hypomagnesemia    Essential hypertension    Stage 3b chronic kidney disease ([facility] [address] )    Meningioma (HCC)    MGUS (monoclonal gammopathy of unknown significance)    Malignant neoplasm of thyroid gland (HCC)    S/P placement of cardiac pacemaker    Other orders  -     losartan (COZAAR) 25 mg tablet; Take 25 mg by mouth 2 (two) times a day          Continue with current medications  Daily weights  Monitor for weight gain, lower extremity edema  Repeat digoxin level BMP  Patient is scheduled for an ablation at [facility] 08/2022  OV 3 months  BMI Counseling: Body mass index is 29 26 kg/m²  The BMI is above normal  Nutrition recommendations include consuming healthier snacks, moderation in carbohydrate intake, reducing intake of saturated fat and trans fat and reducing intake of cholesterol  Robin Cameron     Patient ID: Inder Gardneran is a 67 y o  female  Here with her daughter  TCM s/p admission 06/13/2022 to 06/17/2022  Dx CHF, hyponatremia with altered mental status  Patient presented with increasing shortness of breath lower extremity edema  Chest x-ray shows small bilateral effusions    Sodium prior to admission 125  On repeat 128 in the setting of volume overload  Patient was treated with IV diuresis with symptomatic improvement  Discharge sodium 136  Creatinine 1  16  Mental status improved back to baseline  Limited echocardiogram Small left ventricular systolic function with EF 60% improved from 45%  Right ventricle normal   Dilated left atrium  Known CNS meningoma  Repeat CT scan head during admission Unchanged left frontal extra-axial mass characterize as probable meningioma on prior MRI  No acute intracranial abnormality  04/2022 MRI brain 4 2 x 1 7 x 3 0 cm fairly homogeneously enhancing, extra-axial mass within the inferior lateral aspect of the left anterior cranial fossa  Mild adjacent dural thickening and enhancement is seen extending inferiorly into the middle cranial fossa and superiorly into the dura of the anterior frontal vertex    Findings are most suggestive of meningioma  Neurosurgical evaluation at [address] 05/2022  Awaiting 2nd opinion from [place]  Current medications reviewed  No chest pain or SOB  No weight gain  No LE edema  AF Dose of Amiodarone decreased to 100 mg daily  Digoxin 0 125 mg daily added to regimen  05/2022 echo  Left Ventricle: Left ventricular cavity size is normal  Wall thickness is mildly increased  There is moderate concentric hypertrophy  The left ventricular ejection fraction is 45%  Systolic function is mildly reduced  There is mild global hypokinesis  Right Ventricle: Right ventricular cavity size is mildly dilated  Systolic function is mildly reduced  Left Atrium: The atrium is mildly dilated  Right Atrium: The atrium is mildly dilated  Mitral Valve: There is mild annular calcification  There is mild to moderate regurgitation  Tricuspid Valve: There is moderate regurgitation  The right ventricular systolic pressure is severely elevated  The estimated right ventricular systolic pressure is 59 49 mmHg  Prior  cardio versions  03/2021 admission for atrial fibrillation status post ablation procedure  Pre procedure episode of bradycardia with QT prolongation and torsades successfully defibrillated  Subsequent pacemaker placed   Hypertension blood pressures have been stable on Losartan 75 mg BID and Metoprolol  mg BID     Lab Results   Component Value Date    WBC 7 37 06/16/2022    HGB 14 3 06/16/2022    HCT 43 9 06/16/2022    MCV 96 06/16/2022     06/16/2022     Lab Results   Component Value Date    SODIUM 136 06/16/2022    K 3 7 06/16/2022    CL 96 06/16/2022    CO2 30 06/16/2022    BUN 25 06/16/2022    CREATININE 1 18 06/16/2022    GLUC 114 06/16/2022    CALCIUM 9 7 06/16/2022       Lab Results   Component Value Date     (H) 06/13/2022              The following portions of the patient's history were reviewed and updated as appropriate: allergies, current medications, past family history, past medical history, past social history, past surgical history and problem list     Review of Systems   Constitutional: Positive for fatigue  Negative for appetite change, chills, fever and unexpected weight change  HENT: Positive for hearing loss (bilateral hearing aids )  Negative for congestion, ear pain, rhinorrhea, sore throat and trouble swallowing  Eyes: Negative for visual disturbance  Respiratory: Negative for cough, shortness of breath and wheezing  Cardiovascular: Negative for chest pain, palpitations and leg swelling  See HPI  Gastrointestinal: Negative for abdominal pain, blood in stool, constipation, diarrhea, nausea and vomiting  Colonoscopy 04/2018 Two benign polyps  Mild diverticulosis left-sided colon  Endocrine: Negative for polydipsia and polyuria  12/2020 status post left thyroid lobectomy for left thyroid nodule  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  09/2018 DEXA scan T-score -1 5 left femoral neck  On vitamin D supplement    Lab Results       Component                Value               Date                       [id number]             2 006               06/13/2022                                      Genitourinary: Negative for difficulty urinating  Musculoskeletal: Negative for arthralgias and myalgias  OA shoulders/ she is using prn Tylenol Arthritis and Tramadol   07/2019 x-rays right shoulder mild degenerative arthritis right AC joint  Left shoulder mild degenerative changes left AC joint  08/2019 bilateral shoulder intra-articular steroid injections via fluoroscopy with symptomatic relief  S/p bilateral TKR  Skin: Negative for rash  Neurological: Positive for tremors  Negative for dizziness and headaches  RLS on Requip  04/2021 neurology evaluation for tremor suspected benign essential  tremor  She is currently on a beta-blocker    Family history + essential tremor brother  Hematological: Negative for adenopathy  Does not bruise/bleed easily  Lab Results       Component                Value               Date                       WBC                      7 37                06/16/2022                 HGB                      14 3                06/16/2022                 HCT                      43 9                06/16/2022                 MCV                      96                  06/16/2022                 PLT                      306                 06/16/2022                            Lab Results       Component                Value               Date                       [id number]               399                 04/01/2021              MMA elevated at 489   Psychiatric/Behavioral: Positive for dysphoric mood  Negative for sleep disturbance  The patient is nervous/anxious  on low dose Duloxetine 30 mg/day  Chronic insomnia on prn Zolpidem 10 mg                   Objective:      /80   Pulse 72   Temp (!) 97 1 °F (36 2 °C)   Resp 16   Ht 5' 2" (1 575 m)   Wt 72 6 kg (160 lb)   LMP 02/01/1990 (Within Weeks)   BMI 29 26 kg/m²        BP Readings from Last 3 Encounters:   06/29/22 146/80   06/20/22 140/82   06/17/22 154/92     Wt Readings from Last 3 Encounters:   06/29/22 72 6 kg (160 lb)   06/20/22 72 4 kg (159 lb 9 6 oz)   06/16/22 72 9 kg (160 lb 11 5 oz)            Physical Exam  Vitals and nursing note reviewed  Constitutional:       General: She is not in acute distress  Appearance: She is well-developed  HENT:      Right Ear: Tympanic membrane and ear canal normal       Left Ear: Tympanic membrane and ear canal normal    Eyes:      General: No scleral icterus  Extraocular Movements: Extraocular movements intact  Conjunctiva/sclera: Conjunctivae normal       Pupils: Pupils are equal, round, and reactive to light  Neck:      Thyroid: No thyroid mass or thyromegaly  Vascular: No carotid bruit or JVD  Trachea: No tracheal deviation  Cardiovascular:      Rate and Rhythm: Normal rate  Rhythm irregular  Heart sounds: Normal heart sounds  No murmur heard  No gallop  Pulmonary:      Effort: Pulmonary effort is normal  No respiratory distress  Breath sounds: Normal breath sounds  No wheezing or rales  Chest:   Breasts:      Right: No supraclavicular adenopathy  Left: No supraclavicular adenopathy  Abdominal:      General: Bowel sounds are normal  There is no distension or abdominal bruit  Palpations: Abdomen is soft  There is no hepatomegaly, splenomegaly or mass  Tenderness: There is no abdominal tenderness  There is no guarding or rebound  Musculoskeletal:      Right lower leg: No edema  Left lower leg: No edema  Lymphadenopathy:      Cervical: No cervical adenopathy  Upper Body:      Right upper body: No supraclavicular adenopathy  Left upper body: No supraclavicular adenopathy  Skin:     Findings: No rash  Nails: There is no clubbing  Neurological:      General: No focal deficit present  Mental Status: She is alert and oriented to person, place, and time     Psychiatric:         Mood and Affect: Mood normal          Behavior: Behavior normal

## 2022-07-01 ENCOUNTER — APPOINTMENT (OUTPATIENT)
Dept: LAB | Facility: MEDICAL CENTER | Age: 77
End: 2022-07-01
Payer: MEDICARE

## 2022-07-01 DIAGNOSIS — E87.1 HYPONATREMIA: ICD-10-CM

## 2022-07-01 DIAGNOSIS — I48.19 PERSISTENT ATRIAL FIBRILLATION (HCC): ICD-10-CM

## 2022-07-01 LAB
ANION GAP SERPL CALCULATED.3IONS-SCNC: 9 MMOL/L (ref 4–13)
BUN SERPL-MCNC: 21 MG/DL (ref 5–25)
CALCIUM SERPL-MCNC: 9.3 MG/DL (ref 8.3–10.1)
CHLORIDE SERPL-SCNC: 101 MMOL/L (ref 100–108)
CO2 SERPL-SCNC: 26 MMOL/L (ref 21–32)
CREAT SERPL-MCNC: 1.09 MG/DL (ref 0.6–1.3)
DIGOXIN SERPL-MCNC: 2.3 NG/ML (ref 0.8–2)
GFR SERPL CREATININE-BSD FRML MDRD: 49 ML/MIN/1.73SQ M
GLUCOSE P FAST SERPL-MCNC: 235 MG/DL (ref 65–99)
POTASSIUM SERPL-SCNC: 4.1 MMOL/L (ref 3.5–5.3)
SODIUM SERPL-SCNC: 136 MMOL/L (ref 136–145)
VIT B12 SERPL-MCNC: 799 PG/ML (ref 100–900)

## 2022-07-01 PROCEDURE — 80048 BASIC METABOLIC PNL TOTAL CA: CPT

## 2022-07-01 PROCEDURE — 80162 ASSAY OF DIGOXIN TOTAL: CPT

## 2022-07-01 PROCEDURE — 36415 COLL VENOUS BLD VENIPUNCTURE: CPT

## 2022-07-01 PROCEDURE — 82607 VITAMIN B-12: CPT | Performed by: FAMILY MEDICINE

## 2022-07-04 ENCOUNTER — TELEPHONE (OUTPATIENT)
Dept: FAMILY MEDICINE CLINIC | Facility: CLINIC | Age: 77
End: 2022-07-04

## 2022-07-04 NOTE — TELEPHONE ENCOUNTER
Call re labs vitamin B12 normal  Repeat sodium and kidney function are both normal  Blood sugar 235 was this fasting? ?  Dig level high is this being addressed by Cardiology ? ?      Recent Results (from the past 168 hour(s))   Vitamin B12    Collection Time: 07/01/22  9:38 AM   Result Value Ref Range    Vitamin B-12 799 100 - 900 pg/mL   Digoxin level    Collection Time: 07/01/22  9:38 AM   Result Value Ref Range    Digoxin Lvl 2 3 (H) 0 8 - 2 0 ng/mL   Basic metabolic panel    Collection Time: 07/01/22  9:38 AM   Result Value Ref Range    Sodium 136 136 - 145 mmol/L    Potassium 4 1 3 5 - 5 3 mmol/L    Chloride 101 100 - 108 mmol/L    CO2 26 21 - 32 mmol/L    ANION GAP 9 4 - 13 mmol/L    BUN 21 5 - 25 mg/dL    Creatinine 1 09 0 60 - 1 30 mg/dL    Glucose, Fasting 235 (H) 65 - 99 mg/dL    Calcium 9 3 8 3 - 10 1 mg/dL    eGFR 49 ml/min/1 73sq m       Current Outpatient Medications:     amiodarone 200 mg tablet, Take 1 tablet (200 mg total) by mouth daily (Patient taking differently: Take 100 mg by mouth daily), Disp: 90 tablet, Rfl: 2    apixaban (ELIQUIS) 5 mg, Take 1 tablet (5 mg total) by mouth 2 (two) times a day, Disp: 56 tablet, Rfl: 0    ascorbic acid (VITAMIN C) 500 mg tablet, Take 500 mg by mouth daily , Disp: , Rfl:     Cholecalciferol 50 MCG (2000 UT) CAPS, Take 2,000 Units by mouth 2 (two) times a day  , Disp: , Rfl:     Chromium Picolinate (CHROMIUM PICOLATE PO), Take 1 tablet by mouth daily, Disp: , Rfl:     digoxin (LANOXIN) 0 125 mg tablet, Take 1 tablet (125 mcg total) by mouth daily for 15 days, Disp: 30 tablet, Rfl: 3    DULoxetine (CYMBALTA) 30 mg delayed release capsule, Take 1 capsule (30 mg total) by mouth daily after breakfast, Disp: 30 capsule, Rfl: 2    ezetimibe (ZETIA) 10 mg tablet, Take 1 tablet (10 mg total) by mouth daily, Disp: 90 tablet, Rfl: 3    famotidine (PEPCID) 20 mg tablet, Take 20 mg by mouth every other day  , Disp: , Rfl:     furosemide (LASIX) 20 mg tablet, Take one tab daily when needed for shortness of breath, weight gain 3 pounds in one day or Lower leg swelling , Disp: 10 tablet, Rfl: 3    gabapentin (NEURONTIN) 300 mg capsule, Take 1 capsule (300 mg total) by mouth daily at bedtime, Disp: 30 capsule, Rfl: 0    ipratropium (ATROVENT) 0 03 % nasal spray, 2 sprays into each nostril 3 (three) times a day Begin once per day, then progress to twice per day  Progress to three times a day as tolerated  , Disp: 90 mL, Rfl: 3    lidocaine (XYLOCAINE) 5 % ointment, Apply topically as needed for mild pain, Disp: 35 44 g, Rfl: 0    losartan (COZAAR) 25 mg tablet, Take 25 mg by mouth 2 (two) times a day, Disp: , Rfl:     losartan (COZAAR) 50 mg tablet, Take one  50 mg tablet in addition to a 25 mg tablet twice a day (Patient taking differently: Take 50 mg by mouth 2 (two) times a day With 25 mg tablet twice a day to equal 75 mg 2 times day), Disp: 180 tablet, Rfl: 1    metoprolol succinate (TOPROL-XL) 25 mg 24 hr tablet, Take 5 tablets (125 mg total) by mouth every 12 (twelve) hours for 15 days, Disp: 150 tablet, Rfl: 0    rOPINIRole (REQUIP) 0 5 mg tablet, Take 1 tablet (0 5 mg total) by mouth daily at bedtime, Disp: 90 tablet, Rfl: 3    traMADol (ULTRAM) 50 mg tablet, Take 1 tablet (50 mg total) by mouth 2 (two) times a day as needed for moderate pain, Disp: 60 tablet, Rfl: 3    TURMERIC PO, Take 1 capsule by mouth 2 (two) times a day, Disp: , Rfl:     zolpidem (AMBIEN) 10 mg tablet, Take 1 tablet (10 mg total) by mouth daily at bedtime as needed for sleep, Disp: 90 tablet, Rfl: 1

## 2022-07-05 ENCOUNTER — PATIENT OUTREACH (OUTPATIENT)
Dept: FAMILY MEDICINE CLINIC | Facility: CLINIC | Age: 77
End: 2022-07-05

## 2022-07-05 ENCOUNTER — TELEPHONE (OUTPATIENT)
Dept: CARDIOLOGY CLINIC | Facility: CLINIC | Age: 77
End: 2022-07-05

## 2022-07-05 ENCOUNTER — TELEPHONE (OUTPATIENT)
Dept: FAMILY MEDICINE CLINIC | Facility: CLINIC | Age: 77
End: 2022-07-05

## 2022-07-05 DIAGNOSIS — I48.19 PERSISTENT ATRIAL FIBRILLATION (HCC): ICD-10-CM

## 2022-07-05 DIAGNOSIS — E78.2 MIXED HYPERLIPIDEMIA: ICD-10-CM

## 2022-07-05 DIAGNOSIS — I48.0 PAROXYSMAL ATRIAL FIBRILLATION (HCC): Primary | ICD-10-CM

## 2022-07-05 DIAGNOSIS — R73.9 HYPERGLYCEMIA: Primary | ICD-10-CM

## 2022-07-05 RX ORDER — DIGOXIN 125 MCG
TABLET ORAL
Qty: 30 TABLET | Refills: 3
Start: 2022-07-05 | End: 2022-07-06 | Stop reason: SDUPTHER

## 2022-07-05 NOTE — PROGRESS NOTES
Spoke with Beverly Sawant she will participate in [place] program   [facility] is working with her but will probably be discharging her soon  Weight averaging 155-156 pounds  Denies any lower extremity swelling or any swelling in abdominal area  Family and friends are  driving her to appointments right now  No questions about her discharge instructions  Beverly Sawant said she did not hear back about her sleeping pill  Explained instructions that she was on highest dose of Ambien  Now she Would like to know if there is a different sleeping pill she could try  Will check with Dr Nathan Houser  I will check back in one week

## 2022-07-05 NOTE — TELEPHONE ENCOUNTER
Pt states she has a dry cough x a few months  She takes Robitussin that seems to help  She can continue this if you think it's ok  Or offer other advise

## 2022-07-05 NOTE — TELEPHONE ENCOUNTER
I spoke with patient, she was not fasting  Also, Dr Claire Ortega with cardiology addressed the Doxin this morning,  She is to take every other day and repeat labs in 1 week

## 2022-07-05 NOTE — TELEPHONE ENCOUNTER
----- Message from Moises Hinojosa, [address] sent at 7/5/2022  6:42 AM EDT -----  Please call the patient, her digoxin level is elevated at 2 3  Decreased digoxin to 125 mcg every other day  Skip today  Repeat digoxin level in 1 week    Spoke with pt re: decreasing dig  125 qod, repeat level in 1 week  Pt will comply

## 2022-07-05 NOTE — TELEPHONE ENCOUNTER
Call she can continue with prn Robitussin for her cough   She is already on the max dose of Ambien 10 mg at HS

## 2022-07-05 NOTE — TELEPHONE ENCOUNTER
Patient wants to know if her current dose of Derek Lei can be increased or medication changed  It is not helping her sleep  She is only sleeping 2 hours a night if that  Please advise      [facility]

## 2022-07-06 DIAGNOSIS — I48.19 PERSISTENT ATRIAL FIBRILLATION (HCC): ICD-10-CM

## 2022-07-06 DIAGNOSIS — E78.2 MIXED HYPERLIPIDEMIA: ICD-10-CM

## 2022-07-06 RX ORDER — DIGOXIN 125 MCG
TABLET ORAL
Qty: 90 TABLET | Refills: 3 | Status: SHIPPED | OUTPATIENT
Start: 2022-07-06 | End: 2022-09-28 | Stop reason: CLARIF

## 2022-07-06 NOTE — PROGRESS NOTES
Spoke with patient, gave message  She is willing to keep taking rx maybe it is working "somewhat"  We will call her again after the 18th when PCP is back in the office to determine if she would like to try "weaning off"

## 2022-07-06 NOTE — TELEPHONE ENCOUNTER
Requested medication(s) are due for refill today:   Patient has already received a courtesy refill:   Other reason request has been forwarded to provider: not sure if you are aware, but when you place a med with no print it cancels their script at the pharmacy    Thank you Virgil Severino

## 2022-07-11 ENCOUNTER — APPOINTMENT (OUTPATIENT)
Dept: LAB | Facility: MEDICAL CENTER | Age: 77
End: 2022-07-11
Payer: MEDICARE

## 2022-07-11 ENCOUNTER — PATIENT OUTREACH (OUTPATIENT)
Dept: FAMILY MEDICINE CLINIC | Facility: CLINIC | Age: 77
End: 2022-07-11

## 2022-07-11 DIAGNOSIS — I48.91 ATRIAL FIBRILLATION, UNSPECIFIED TYPE (HCC): ICD-10-CM

## 2022-07-11 DIAGNOSIS — I48.19 PERSISTENT ATRIAL FIBRILLATION (HCC): ICD-10-CM

## 2022-07-11 DIAGNOSIS — D47.2 MGUS (MONOCLONAL GAMMOPATHY OF UNKNOWN SIGNIFICANCE): ICD-10-CM

## 2022-07-11 DIAGNOSIS — Z79.899 LONG TERM CURRENT USE OF AMIODARONE: ICD-10-CM

## 2022-07-11 LAB
ALBUMIN SERPL BCP-MCNC: 3.3 G/DL (ref 3.5–5)
ALP SERPL-CCNC: 104 U/L (ref 46–116)
ALT SERPL W P-5'-P-CCNC: 40 U/L (ref 12–78)
ANION GAP SERPL CALCULATED.3IONS-SCNC: 6 MMOL/L (ref 4–13)
AST SERPL W P-5'-P-CCNC: 29 U/L (ref 5–45)
BASOPHILS # BLD AUTO: 0.05 THOUSANDS/ΜL (ref 0–0.1)
BASOPHILS NFR BLD AUTO: 1 % (ref 0–1)
BILIRUB SERPL-MCNC: 1.04 MG/DL (ref 0.2–1)
BUN SERPL-MCNC: 20 MG/DL (ref 5–25)
CALCIUM ALBUM COR SERPL-MCNC: 10.5 MG/DL (ref 8.3–10.1)
CALCIUM SERPL-MCNC: 9.9 MG/DL (ref 8.3–10.1)
CHLORIDE SERPL-SCNC: 102 MMOL/L (ref 100–108)
CO2 SERPL-SCNC: 29 MMOL/L (ref 21–32)
CREAT SERPL-MCNC: 1.13 MG/DL (ref 0.6–1.3)
DIGOXIN SERPL-MCNC: 1 NG/ML (ref 0.8–2)
EOSINOPHIL # BLD AUTO: 0.25 THOUSAND/ΜL (ref 0–0.61)
EOSINOPHIL NFR BLD AUTO: 3 % (ref 0–6)
ERYTHROCYTE [DISTWIDTH] IN BLOOD BY AUTOMATED COUNT: 13.3 % (ref 11.6–15.1)
EST. AVERAGE GLUCOSE BLD GHB EST-MCNC: 163 MG/DL
GFR SERPL CREATININE-BSD FRML MDRD: 46 ML/MIN/1.73SQ M
GLUCOSE P FAST SERPL-MCNC: 160 MG/DL (ref 65–99)
HBA1C MFR BLD: 7.3 %
HCT VFR BLD AUTO: 43.7 % (ref 34.8–46.1)
HGB BLD-MCNC: 14.1 G/DL (ref 11.5–15.4)
IGA SERPL-MCNC: 518 MG/DL (ref 70–400)
IGG SERPL-MCNC: 960 MG/DL (ref 700–1600)
IGM SERPL-MCNC: 68 MG/DL (ref 40–230)
IMM GRANULOCYTES # BLD AUTO: 0.03 THOUSAND/UL (ref 0–0.2)
IMM GRANULOCYTES NFR BLD AUTO: 0 % (ref 0–2)
LYMPHOCYTES # BLD AUTO: 1.2 THOUSANDS/ΜL (ref 0.6–4.47)
LYMPHOCYTES NFR BLD AUTO: 16 % (ref 14–44)
MCH RBC QN AUTO: 31.1 PG (ref 26.8–34.3)
MCHC RBC AUTO-ENTMCNC: 32.3 G/DL (ref 31.4–37.4)
MCV RBC AUTO: 97 FL (ref 82–98)
MONOCYTES # BLD AUTO: 0.65 THOUSAND/ΜL (ref 0.17–1.22)
MONOCYTES NFR BLD AUTO: 9 % (ref 4–12)
NEUTROPHILS # BLD AUTO: 5.3 THOUSANDS/ΜL (ref 1.85–7.62)
NEUTS SEG NFR BLD AUTO: 71 % (ref 43–75)
NRBC BLD AUTO-RTO: 0 /100 WBCS
PLATELET # BLD AUTO: 283 THOUSANDS/UL (ref 149–390)
PMV BLD AUTO: 10.8 FL (ref 8.9–12.7)
POTASSIUM SERPL-SCNC: 4.2 MMOL/L (ref 3.5–5.3)
PROT SERPL-MCNC: 7.7 G/DL (ref 6.4–8.2)
RBC # BLD AUTO: 4.53 MILLION/UL (ref 3.81–5.12)
SODIUM SERPL-SCNC: 137 MMOL/L (ref 136–145)
TSH SERPL DL<=0.05 MIU/L-ACNC: 0.74 UIU/ML (ref 0.45–4.5)
URATE SERPL-MCNC: 5.3 MG/DL (ref 2–6.8)
WBC # BLD AUTO: 7.48 THOUSAND/UL (ref 4.31–10.16)

## 2022-07-11 PROCEDURE — 82232 ASSAY OF BETA-2 PROTEIN: CPT

## 2022-07-11 PROCEDURE — 86334 IMMUNOFIX E-PHORESIS SERUM: CPT

## 2022-07-11 PROCEDURE — 36415 COLL VENOUS BLD VENIPUNCTURE: CPT | Performed by: FAMILY MEDICINE

## 2022-07-11 PROCEDURE — 85025 COMPLETE CBC W/AUTO DIFF WBC: CPT

## 2022-07-11 PROCEDURE — 83036 HEMOGLOBIN GLYCOSYLATED A1C: CPT | Performed by: FAMILY MEDICINE

## 2022-07-11 PROCEDURE — 80053 COMPREHEN METABOLIC PANEL: CPT

## 2022-07-11 PROCEDURE — 84550 ASSAY OF BLOOD/URIC ACID: CPT

## 2022-07-11 PROCEDURE — 86334 IMMUNOFIX E-PHORESIS SERUM: CPT | Performed by: PATHOLOGY

## 2022-07-11 PROCEDURE — 84165 PROTEIN E-PHORESIS SERUM: CPT | Performed by: PATHOLOGY

## 2022-07-11 PROCEDURE — 84165 PROTEIN E-PHORESIS SERUM: CPT

## 2022-07-11 PROCEDURE — 84443 ASSAY THYROID STIM HORMONE: CPT

## 2022-07-11 PROCEDURE — 82784 ASSAY IGA/IGD/IGG/IGM EACH: CPT

## 2022-07-11 PROCEDURE — 80162 ASSAY OF DIGOXIN TOTAL: CPT

## 2022-07-11 PROCEDURE — 83521 IG LIGHT CHAINS FREE EACH: CPT

## 2022-07-11 NOTE — PROGRESS NOTES
Spoke with Cristin Rosas she states she is doing good Completed blood work this morning  No questions or concerns today  I will check back in two weeks

## 2022-07-12 ENCOUNTER — TELEPHONE (OUTPATIENT)
Dept: FAMILY MEDICINE CLINIC | Facility: CLINIC | Age: 77
End: 2022-07-12

## 2022-07-12 ENCOUNTER — OFFICE VISIT (OUTPATIENT)
Dept: NEUROLOGY | Facility: CLINIC | Age: 77
End: 2022-07-12
Payer: MEDICARE

## 2022-07-12 VITALS
SYSTOLIC BLOOD PRESSURE: 143 MMHG | HEART RATE: 64 BPM | DIASTOLIC BLOOD PRESSURE: 69 MMHG | BODY MASS INDEX: 28.74 KG/M2 | TEMPERATURE: 96.8 F | WEIGHT: 156.2 LBS | HEIGHT: 62 IN

## 2022-07-12 DIAGNOSIS — R25.1 TREMORS OF NERVOUS SYSTEM: ICD-10-CM

## 2022-07-12 DIAGNOSIS — G62.9 PERIPHERAL POLYNEUROPATHY: Primary | ICD-10-CM

## 2022-07-12 LAB
ALBUMIN SERPL ELPH-MCNC: 3.79 G/DL (ref 3.5–5)
ALBUMIN SERPL ELPH-MCNC: 51.9 % (ref 52–65)
ALPHA1 GLOB SERPL ELPH-MCNC: 0.36 G/DL (ref 0.1–0.4)
ALPHA1 GLOB SERPL ELPH-MCNC: 4.9 % (ref 2.5–5)
ALPHA2 GLOB SERPL ELPH-MCNC: 0.92 G/DL (ref 0.4–1.2)
ALPHA2 GLOB SERPL ELPH-MCNC: 12.6 % (ref 7–13)
BETA GLOB ABNORMAL SERPL ELPH-MCNC: 0.56 G/DL (ref 0.4–0.8)
BETA1 GLOB SERPL ELPH-MCNC: 7.7 % (ref 5–13)
BETA2 GLOB SERPL ELPH-MCNC: 7.7 % (ref 2–8)
BETA2+GAMMA GLOB SERPL ELPH-MCNC: 0.56 G/DL (ref 0.2–0.5)
GAMMA GLOB ABNORMAL SERPL ELPH-MCNC: 1.11 G/DL (ref 0.5–1.6)
GAMMA GLOB SERPL ELPH-MCNC: 15.2 % (ref 12–22)
IGG/ALB SER: 1.08 {RATIO} (ref 1.1–1.8)
INTERPRETATION UR IFE-IMP: NORMAL
KAPPA LC FREE SER-MCNC: 36 MG/L (ref 3.3–19.4)
KAPPA LC FREE/LAMBDA FREE SER: 1.3 {RATIO} (ref 0.26–1.65)
LAMBDA LC FREE SERPL-MCNC: 27.7 MG/L (ref 5.7–26.3)
M PROTEIN 1 MFR SERPL ELPH: 2.4 %
M PROTEIN 1 SERPL ELPH-MCNC: 0.18 G/DL
PROT PATTERN SERPL ELPH-IMP: ABNORMAL
PROT SERPL-MCNC: 7.3 G/DL (ref 6.4–8.2)

## 2022-07-12 PROCEDURE — 99214 OFFICE O/P EST MOD 30 MIN: CPT | Performed by: PSYCHIATRY & NEUROLOGY

## 2022-07-12 NOTE — TELEPHONE ENCOUNTER
Call re labs  A1c 7 3 now in the diabetes range  Goal of treatment 7 or less  Options start medication low Glimepiride 1 mg daily (generic and not expensive) or new agent such as Jardiance 10 mg  daily expensive  Bassam Manner also has an indication for heart failure   OR watch diet and repeat A1c in 3 months

## 2022-07-12 NOTE — ASSESSMENT & PLAN NOTE
Overall, this patient continues to be very stable in regards to her symptoms from neuropathy, which has been going on for number of years  Workup thus has revealed suboptimal B12 level, she is on replacement, monoclonal gammopathy of unknown significance, (following with Hematology), and more recently diagnosed with diabetes  We talked about the risk factors again, she understands the role of controlling diabetes, and vitamin supplementation, encouraged her to remain physically active and exercise on a regular basis  Continues to have heat like sensation in the thoracic region, which is postural, this is stable, imaging in the past has been unremarkable  Continue gabapentin and Cymbalta at her current doses, no change in dose was recommended today  Lastly, she does have minimal tremors, with family history of benign essential tremors, this has been stable  Patient does not feel there is need for any medication at this time  We discussed follow-up for her, considering the stable symptoms, she decided to keep her follow-up with us on an as-needed basis, She understands she can contact us any time for any questions or concerns  Thank you for allowing us to participate in her care

## 2022-07-12 NOTE — PROGRESS NOTES
Patient ID: Seema Mann is a 67 y o  female  Assessment/Plan:    Peripheral neuropathy  Overall, this patient continues to be very stable in regards to her symptoms from neuropathy, which has been going on for number of years  Workup thus has revealed suboptimal B12 level, she is on replacement, monoclonal gammopathy of unknown significance, (following with Hematology), and more recently diagnosed with diabetes  We talked about the risk factors again, she understands the role of controlling diabetes, and vitamin supplementation, encouraged her to remain physically active and exercise on a regular basis  Continues to have heat like sensation in the thoracic region, which is postural, this is stable, imaging in the past has been unremarkable  Continue gabapentin and Cymbalta at her current doses, no change in dose was recommended today  Lastly, she does have minimal tremors, with family history of benign essential tremors, this has been stable  Patient does not feel there is need for any medication at this time  We discussed follow-up for her, considering the stable symptoms, she decided to keep her follow-up with us on an as-needed basis, She understands she can contact us any time for any questions or concerns  Thank you for allowing us to participate in her care  Diagnoses and all orders for this visit:    Peripheral polyneuropathy    Tremors of nervous system           Subjective:    HPI    I had the pleasure of seeing your patient in Neurology Clinic for neuromuscular follow-up  As you know, patient is a 54-year-old woman with peripheral neuropathy, monoclonal gammopathy of unknown significance  She was last seen by me in April 2021 for initial consultation, has since been evaluated by my colleague, last evaluation was in January 2022      As you know, patient has had intermittent sensation of warmth around her abdomen which has been going on since 2020, she described that as a bandlike sensation in the thoracic region, thoracic spine imaging was unremarkable  She continues to have the same symptoms, only when she is sitting down, resolved when she gets up  It is not particularly bothersome to her  Patient does have numbness in both her feet, which has been stable since she was last evaluated  She denies any weakness, balance is minimally affected, denies any falls  Does have tremors in hands, which periodically get worse (family hx of benign tremors in her brother, s/p DBS), does not think tremors or significant that she needs to be on any medications  Has RLS, on requip, may occasionally feel the whole body shaking for a few seconds to a min, not bothersome  H/o a fib, CHF, being planned for ablation on Aug 2nd  On gabapentin 300 mg at bedtime which was started for lower back pain, and Cymbalta 30 mg which was prescribed by PCP for mood, has not changed her symptoms  Workup in the past  Blood work 2020 normal TSH   CBC, CMP were normal    Copper, Ceruloplasmin, acetylcholine receptor antibodies, B6-normal  B12 399, MMA elevated at 44  Serum immunofixation: monoclonal gammopathy identified as IgG lambda (0 26 g/dL)-MGUS  Patient was seen by Hematology, being monitored with blood work every 6 months      MRI of the thoracic spine performed in September 2020: mild spondylosis and osteoarthritis, without any evidence for cord compression, or any neuroforaminal compression  Other medical problems include left frontal meningioma, being followed with neurosurgery at [facility] recommended repeat imaging in November 2022    Brain MRI April 2022:  4 2 x 1 7 x 3 0 cm fairly homogeneously enhancing, extra-axial mass within the inferior lateral aspect of the left anterior cranial fossa  Mild adjacent dural thickening and enhancement is seen extending inferiorly into the middle cranial fossa and   superiorly into the dura of the anterior frontal vertex    Findings are most suggestive of meningioma  Surgical consultation recommended  Repeat labs from yesterday, hemoglobin A1c 7 3, CBC and CMP were normal, quantitative immunoglobulins, light chains, SPEP and immunofixation are pending, TSH was normal   B12 was normal in July 2022    The following portions of the patient's history were reviewed and updated as appropriate: allergies, current medications, past family history, past medical history, past social history, past surgical history and problem list          Objective:    Blood pressure 143/69, pulse 64, temperature (!) 96 8 °F (36 °C), height 5' 2" (1 575 m), weight 70 9 kg (156 lb 3 2 oz), last menstrual period 02/01/1990, not currently breastfeeding  Physical Exam   On general examination, patient was not in any acute distress  HEENT was unremarkable  Extremities did not reveal any edema  Neurological Exam  Neurologically, patient was awake and alert  Speech was fluent without any dysarthria  Cranial examination did not reveal any ptosis, normal extraocular movements, normal strength eye closure muscles, no facial asymmetry, tongue was midline  On motor examination, she had minimal tremors in the outstretched hands position, finger-to-nose was normal, no terminal dysmetria, no significant cogwheeling or rigidity  Muscle strength was normal in neck flexors, extensors, and all muscle groups in both upper and lower extremities  Sensory examination with minimal acral sensory loss to pin prick up to mid feet, and temp up to ankles bilaterally, vibration was reduced at the toes (4-5 sec)  Reflexes were 2+ in the upper extremities, 2 at the knees, 1 at the ankles bilaterally  She ambulates with the help of a cane because of her knee pain      ROS:  I reviewed the below ROS and what is mentioned in HPI, the remainder of ROS was negative  Review of Systems   Constitutional: Negative  HENT: Negative  Eyes: Negative  Respiratory: Negative  Cardiovascular: Negative  Gastrointestinal: Negative  Endocrine: Negative  Genitourinary: Negative  Musculoskeletal: Negative  Skin: Negative  Allergic/Immunologic: Negative  Neurological: Positive for tremors and numbness  Tingling   Restless Leg    Hematological: Negative  Psychiatric/Behavioral: Negative

## 2022-07-13 LAB — B2 MICROGLOB SERPL-MCNC: 3.1 MG/L (ref 0.6–2.4)

## 2022-07-13 NOTE — TELEPHONE ENCOUNTER
Patient notified of message  She would like to start the Glimepiride 1 mg daily, please send to [facility] in [place]

## 2022-07-14 DIAGNOSIS — E11.9 TYPE 2 DIABETES MELLITUS WITHOUT COMPLICATION, WITHOUT LONG-TERM CURRENT USE OF INSULIN (HCC): Primary | ICD-10-CM

## 2022-07-14 RX ORDER — GLIMEPIRIDE 1 MG/1
1 TABLET ORAL
Qty: 30 TABLET | Refills: 5 | Status: SHIPPED | OUTPATIENT
Start: 2022-07-14 | End: 2022-09-28

## 2022-07-25 ENCOUNTER — PATIENT OUTREACH (OUTPATIENT)
Dept: FAMILY MEDICINE CLINIC | Facility: CLINIC | Age: 77
End: 2022-07-25

## 2022-07-25 NOTE — PROGRESS NOTES
Patient returned my call   Weight is 152 pounds if she gets any lower extremity swelling she takes a water pill and it goes away  Still not sleeping well worried to go off Ambien completely  She can discuss this at her September appointment with PCP  I will check back in two weeks

## 2022-08-08 ENCOUNTER — PATIENT OUTREACH (OUTPATIENT)
Dept: FAMILY MEDICINE CLINIC | Facility: CLINIC | Age: 77
End: 2022-08-08

## 2022-08-08 ENCOUNTER — OFFICE VISIT (OUTPATIENT)
Dept: HEMATOLOGY ONCOLOGY | Facility: CLINIC | Age: 77
End: 2022-08-08
Payer: MEDICARE

## 2022-08-08 VITALS
SYSTOLIC BLOOD PRESSURE: 160 MMHG | BODY MASS INDEX: 29.33 KG/M2 | HEIGHT: 62 IN | OXYGEN SATURATION: 97 % | RESPIRATION RATE: 17 BRPM | WEIGHT: 159.4 LBS | TEMPERATURE: 97 F | DIASTOLIC BLOOD PRESSURE: 80 MMHG | HEART RATE: 66 BPM

## 2022-08-08 DIAGNOSIS — D47.2 MGUS (MONOCLONAL GAMMOPATHY OF UNKNOWN SIGNIFICANCE): Primary | ICD-10-CM

## 2022-08-08 PROCEDURE — 99214 OFFICE O/P EST MOD 30 MIN: CPT | Performed by: PHYSICIAN ASSISTANT

## 2022-08-08 RX ORDER — ASPIRIN 81 MG/1
81 TABLET, CHEWABLE ORAL DAILY
COMMUNITY
Start: 2022-08-04 | End: 2022-09-19 | Stop reason: ALTCHOICE

## 2022-08-08 RX ORDER — PANTOPRAZOLE SODIUM 20 MG/1
1 TABLET, DELAYED RELEASE ORAL
COMMUNITY
Start: 2022-08-05

## 2022-08-08 RX ORDER — PANTOPRAZOLE SODIUM 20 MG/1
20 TABLET, DELAYED RELEASE ORAL
COMMUNITY
Start: 2022-08-05

## 2022-08-08 RX ORDER — OMEPRAZOLE 20 MG/1
20 CAPSULE, DELAYED RELEASE ORAL DAILY
COMMUNITY
Start: 2022-08-03 | End: 2022-09-28

## 2022-08-08 NOTE — PROGRESS NOTES
Spoke with Carlos Fournier  Her ablation went well last week  Has follow up appointment 8/10/22 then PCP appointment 8/11/22  Weight 158  5 pounds  Feels she does have some water weight  Takes lasix prn  Still has dry cough thought it was allergies  Will discuss with cardiologist and PCP  To see orthopedics tomorrow hoping he removes fluid from her knee  I will check back in two- three weeks

## 2022-08-08 NOTE — PROGRESS NOTES
Hematology/Oncology Outpatient Follow-up  Jack Uribe 65 y o  female 1945 [id number]    Date:  8/8/2022      Assessment and Plan:  1  MGUS (monoclonal gammopathy of unknown significance)  65year old female presents for follow up for history of MGUS  SPEP shows stable to improved protein spike  Reviewed chance of transformation into multiple myeloma as 1% chance per year  Follow up in 6 months with labs  - CBC and differential; Future  - IgG, IgA, IgM; Future  - Immunoglobulin free LT chains blood; Future  - Protein electrophoresis, serum; Future  - Comprehensive metabolic panel; Future    HPI:  65year old female presents for follow up for MGUS  Most recent labs: IgG lambda 0 18 g/dL  Free light chain ratio normal   Serum IgA 518, IgG and IgM normal   Beta-2 microglobulin 3 1   CBC and CMP normal     ROS: Review of Systems   Constitutional: Positive for fatigue (mild)  Negative for activity change, appetite change, chills, fever and unexpected weight change  Respiratory: Positive for cough (dry cough) and shortness of breath (with climbing steps )  Cardiovascular: Negative for chest pain, palpitations and leg swelling  Gastrointestinal: Negative for abdominal pain, constipation, diarrhea, nausea and vomiting  Genitourinary: Negative for difficulty urinating, dysuria and hematuria  Musculoskeletal: Negative for arthralgias  Skin: Negative  Neurological: Negative for dizziness, weakness, light-headedness, numbness and headaches  Hematological: Negative  Psychiatric/Behavioral: Negative          Past Medical History:   Diagnosis Date    Actinic keratosis     last assessed - [id number]    Arthritis     Atrial fibrillation with rapid ventricular response (HCC)     last assessed - 26Apr2016    Basal cell carcinoma     Benign colon polyp     last assessed - 27Apr2015    Disease of thyroid gland     Effusion of knee joint right     Resolved - 19Apr2016    Esophageal reflux  Fibromyalgia     last assessed - [id number]    Fibromyalgia, primary     GERD (gastroesophageal reflux disease)     Hypertension     Palpitations     last assessed - [id number]    Peroneal tendonitis, right     last assessed - [id number]    Right lumbar radiculopathy 3/17/2016    Thyroid [facility] ([facility] [address] )     Thyroid nodule     Trochanteric bursitis of left hip 3/9/2018       Past Surgical History:   Procedure Laterality Date    CATARACT EXTRACTION Bilateral     COLONOSCOPY  03/2018    EYE SURGERY      HYSTERECTOMY      JOINT REPLACEMENT Left     knee    NV REVISE MEDIAN N/CARPAL TUNNEL SURG Right 11/14/2019    Procedure: RELEASE CARPAL TUNNEL;  Surgeon: Carl Hirsch MD;  [facility];  Service: Orthopedics    NV THYROID LOBECTOMY,UNILAT Left 12/16/2020    Procedure: Left THYROID LOBECTOMY;  Surgeon: Elie Brady MD;  [facility];  Service: ENT    RECTAL POLYPECTOMY      REPLACEMENT TOTAL KNEE Left     last assessed - [id number]    TONSILLECTOMY      TOTAL ABDOMINAL HYSTERECTOMY      TUBAL LIGATION      US GUIDED THYROID BIOPSY  10/14/2020       Social History     Socioeconomic History    Marital status:      Spouse name: None    Number of children: None    Years of education: None    Highest education level: None   Occupational History    None   Tobacco Use    Smoking status: Never Smoker    Smokeless tobacco: Never Used   Vaping Use    Vaping Use: Never used   Substance and Sexual Activity    Alcohol use: Not Currently     Comment: occosional - every 3 months    Drug use: Never    Sexual activity: Not Currently   Other Topics Concern    None   Social History Narrative    None     Social Determinants of Health     Financial Resource Strain: Not on file   Food Insecurity: Not on file   Transportation Needs: No Transportation Needs    Lack of Transportation (Medical): No    Lack of Transportation (Non-Medical):  No   Physical Activity: Not on file   Stress: Not on file   Social Connections: Not on file   Intimate Partner Violence: Not on file   Housing Stability: Not on file       Family History   Problem Relation Age of Onset    Heart disease Mother     Diabetes Mother     Heart disease Father     Coronary artery disease Father     Stroke Father         cerebrovascular accident    Heart attack Father         myocardial infarction    Sudden death Father         scd    Other Family         Back disorder    Coronary artery disease Family     Neuropathy Family     Osteoporosis Family     No Known Problems Daughter     No Known Problems Maternal Grandmother     No Known Problems Maternal Grandfather     No Known Problems Paternal Grandmother     No Known Problems Paternal Grandfather     Cancer Maternal Uncle     Breast cancer Maternal Aunt 72    No Known Problems Son     No Known Problems Maternal Aunt     No Known Problems Maternal Aunt     No Known Problems Maternal Aunt     No Known Problems Paternal Aunt     No Known Problems Paternal Aunt     Anuerysm Neg Hx     Clotting disorder Neg Hx     Arrhythmia Neg Hx     Heart failure Neg Hx        Allergies   Allergen Reactions    Sotalol Other (See Comments)     Prolonged QTc leading to torsades de pointes     Penicillins Other (See Comments)     As a child calcium deposit in the arm     Ace Inhibitors GI Intolerance     Did feel well on it         Current Outpatient Medications:     amiodarone 200 mg tablet, Take 1 tablet (200 mg total) by mouth daily (Patient taking differently: Take 100 mg by mouth daily), Disp: 90 tablet, Rfl: 2    apixaban (ELIQUIS) 5 mg, Take 1 tablet (5 mg total) by mouth 2 (two) times a day, Disp: 56 tablet, Rfl: 0    ascorbic acid (VITAMIN C) 500 mg tablet, Take 500 mg by mouth daily , Disp: , Rfl:     Cholecalciferol 50 MCG (2000 UT) CAPS, Take 2,000 Units by mouth 2 (two) times a day  , Disp: , Rfl:     Chromium Picolinate (CHROMIUM PICOLATE PO), Take 1 tablet by mouth daily, Disp: , Rfl:     digoxin (LANOXIN) 0 125 mg tablet, Take 1 tablet ( 125 mcg total) by mouth every other day, Disp: 90 tablet, Rfl: 3    DULoxetine (CYMBALTA) 30 mg delayed release capsule, Take 1 capsule (30 mg total) by mouth daily after breakfast, Disp: 30 capsule, Rfl: 2    ezetimibe (ZETIA) 10 mg tablet, Take 1 tablet (10 mg total) by mouth daily, Disp: 90 tablet, Rfl: 3    famotidine (PEPCID) 20 mg tablet, Take 20 mg by mouth every other day  , Disp: , Rfl:     furosemide (LASIX) 20 mg tablet, Take one tab daily when needed for shortness of breath, weight gain 3 pounds in one day or Lower leg swelling , Disp: 10 tablet, Rfl: 3    gabapentin (NEURONTIN) 300 mg capsule, Take 1 capsule (300 mg total) by mouth daily at bedtime, Disp: 30 capsule, Rfl: 0    glimepiride (AMARYL) 1 mg tablet, Take 1 tablet (1 mg total) by mouth daily with breakfast, Disp: 30 tablet, Rfl: 5    ipratropium (ATROVENT) 0 03 % nasal spray, 2 sprays into each nostril 3 (three) times a day Begin once per day, then progress to twice per day  Progress to three times a day as tolerated  , Disp: 90 mL, Rfl: 3    lidocaine (XYLOCAINE) 5 % ointment, Apply topically as needed for mild pain, Disp: 35 44 g, Rfl: 0    losartan (COZAAR) 25 mg tablet, Take 25 mg by mouth 2 (two) times a day, Disp: , Rfl:     losartan (COZAAR) 50 mg tablet, Take one  50 mg tablet in addition to a 25 mg tablet twice a day (Patient taking differently: Take 50 mg by mouth 2 (two) times a day With 25 mg tablet twice a day to equal 75 mg 2 times day), Disp: 180 tablet, Rfl: 1    metoprolol succinate (TOPROL-XL) 25 mg 24 hr tablet, Take 5 tablets (125 mg total) by mouth every 12 (twelve) hours for 15 days, Disp: 150 tablet, Rfl: 0    rOPINIRole (REQUIP) 0 5 mg tablet, Take 1 tablet (0 5 mg total) by mouth daily at bedtime, Disp: 90 tablet, Rfl: 3    traMADol (ULTRAM) 50 mg tablet, Take 1 tablet (50 mg total) by mouth 2 (two) times a day as needed for moderate pain, Disp: 60 tablet, Rfl: 3    TURMERIC PO, Take 1 capsule by mouth 2 (two) times a day, Disp: , Rfl:     zolpidem (AMBIEN) 10 mg tablet, Take 1 tablet (10 mg total) by mouth daily at bedtime as needed for sleep, Disp: 90 tablet, Rfl: 1      Physical Exam:  /80 (BP Location: Left arm, Patient Position: Sitting, Cuff Size: Adult)   Pulse 66   Temp (!) 97 °F (36 1 °C)   Resp 17   Ht 5' 2" (1 575 m)   Wt 72 3 kg (159 lb 6 4 oz)   LMP 02/01/1990 (Within Weeks)   SpO2 97%   BMI 29 15 kg/m²     Physical Exam  Vitals reviewed  Constitutional:       General: She is not in acute distress  Appearance: She is well-developed  She is not ill-appearing  HENT:      Head: Normocephalic and atraumatic  Eyes:      General: No scleral icterus  Conjunctiva/sclera: Conjunctivae normal    Cardiovascular:      Rate and Rhythm: Normal rate and regular rhythm  Heart sounds: Normal heart sounds  No murmur heard  Pulmonary:      Effort: Pulmonary effort is normal  No respiratory distress  Breath sounds: Normal breath sounds  Abdominal:      Palpations: Abdomen is soft  Tenderness: There is no abdominal tenderness  Musculoskeletal:         General: No tenderness  Normal range of motion  Cervical back: Normal range of motion and neck supple  Right lower leg: No edema  Left lower leg: No edema  Lymphadenopathy:      Cervical: No cervical adenopathy  Skin:     General: Skin is warm and dry  Neurological:      Mental Status: She is alert and oriented to person, place, and time  Cranial Nerves: No cranial nerve deficit     Psychiatric:         Mood and Affect: Mood normal          Behavior: Behavior normal        Labs:  Lab Results   Component Value Date    WBC 7 48 07/11/2022    HGB 14 1 07/11/2022    HCT 43 7 07/11/2022    MCV 97 07/11/2022     07/11/2022     Lab Results   Component Value Date     05/06/2015    K 4 2 07/11/2022     07/11/2022 CO2 29 07/11/2022    ANIONGAP 9 05/06/2015    BUN 20 07/11/2022    CREATININE 1 13 07/11/2022    GLUCOSE 114 05/06/2015    GLUF 160 (H) 07/11/2022    CALCIUM 9 9 07/11/2022    CORRECTEDCA 10 5 (H) 07/11/2022    AST 29 07/11/2022    ALT 40 07/11/2022    ALKPHOS 104 07/11/2022    PROT 7 0 05/06/2015    BILITOT 0 78 05/06/2015    EGFR 46 07/11/2022     Patient voiced understanding and agreement in the above discussion  Aware to contact our office with questions/symptoms in the interim  This note has been generated by voice recognition software system  Therefore, there may be spelling, grammar, and or syntax errors  Please contact if questions arise

## 2022-08-09 ENCOUNTER — OFFICE VISIT (OUTPATIENT)
Dept: PAIN MEDICINE | Facility: CLINIC | Age: 77
End: 2022-08-09
Payer: MEDICARE

## 2022-08-09 ENCOUNTER — OFFICE VISIT (OUTPATIENT)
Dept: OBGYN CLINIC | Facility: HOSPITAL | Age: 77
End: 2022-08-09
Payer: MEDICARE

## 2022-08-09 VITALS
HEART RATE: 64 BPM | BODY MASS INDEX: 29.44 KG/M2 | DIASTOLIC BLOOD PRESSURE: 84 MMHG | WEIGHT: 160 LBS | SYSTOLIC BLOOD PRESSURE: 172 MMHG | RESPIRATION RATE: 16 BRPM | HEIGHT: 62 IN

## 2022-08-09 VITALS
SYSTOLIC BLOOD PRESSURE: 159 MMHG | BODY MASS INDEX: 29.08 KG/M2 | WEIGHT: 158 LBS | HEIGHT: 62 IN | DIASTOLIC BLOOD PRESSURE: 75 MMHG | HEART RATE: 68 BPM

## 2022-08-09 DIAGNOSIS — G62.9 PERIPHERAL POLYNEUROPATHY: ICD-10-CM

## 2022-08-09 DIAGNOSIS — M70.51 PES ANSERINUS BURSITIS OF BOTH KNEES: Primary | ICD-10-CM

## 2022-08-09 DIAGNOSIS — G89.4 CHRONIC PAIN SYNDROME: Primary | ICD-10-CM

## 2022-08-09 DIAGNOSIS — M79.7 FIBROMYALGIA: ICD-10-CM

## 2022-08-09 DIAGNOSIS — M47.816 LUMBAR SPONDYLOSIS: ICD-10-CM

## 2022-08-09 DIAGNOSIS — M70.52 PES ANSERINUS BURSITIS OF BOTH KNEES: Primary | ICD-10-CM

## 2022-08-09 PROCEDURE — 99214 OFFICE O/P EST MOD 30 MIN: CPT

## 2022-08-09 PROCEDURE — 99213 OFFICE O/P EST LOW 20 MIN: CPT | Performed by: ORTHOPAEDIC SURGERY

## 2022-08-09 PROCEDURE — 20610 DRAIN/INJ JOINT/BURSA W/O US: CPT | Performed by: ORTHOPAEDIC SURGERY

## 2022-08-09 RX ORDER — BUPIVACAINE HYDROCHLORIDE 2.5 MG/ML
2 INJECTION, SOLUTION INFILTRATION; PERINEURAL
Status: COMPLETED | OUTPATIENT
Start: 2022-08-09 | End: 2022-08-09

## 2022-08-09 RX ORDER — BETAMETHASONE SODIUM PHOSPHATE AND BETAMETHASONE ACETATE 3; 3 MG/ML; MG/ML
12 INJECTION, SUSPENSION INTRA-ARTICULAR; INTRALESIONAL; INTRAMUSCULAR; SOFT TISSUE
Status: COMPLETED | OUTPATIENT
Start: 2022-08-09 | End: 2022-08-09

## 2022-08-09 RX ORDER — LIDOCAINE HYDROCHLORIDE 10 MG/ML
2 INJECTION, SOLUTION INFILTRATION; PERINEURAL
Status: COMPLETED | OUTPATIENT
Start: 2022-08-09 | End: 2022-08-09

## 2022-08-09 RX ORDER — KETOROLAC TROMETHAMINE 30 MG/ML
60 INJECTION, SOLUTION INTRAMUSCULAR; INTRAVENOUS
Status: COMPLETED | OUTPATIENT
Start: 2022-08-09 | End: 2022-08-09

## 2022-08-09 RX ORDER — GABAPENTIN 300 MG/1
300 CAPSULE ORAL
Qty: 30 CAPSULE | Refills: 5 | Status: SHIPPED | OUTPATIENT
Start: 2022-08-09

## 2022-08-09 RX ADMIN — LIDOCAINE HYDROCHLORIDE 2 ML: 10 INJECTION, SOLUTION INFILTRATION; PERINEURAL at 08:11

## 2022-08-09 RX ADMIN — KETOROLAC TROMETHAMINE 60 MG: 30 INJECTION, SOLUTION INTRAMUSCULAR; INTRAVENOUS at 08:11

## 2022-08-09 RX ADMIN — BUPIVACAINE HYDROCHLORIDE 2 ML: 2.5 INJECTION, SOLUTION INFILTRATION; PERINEURAL at 08:11

## 2022-08-09 RX ADMIN — BETAMETHASONE SODIUM PHOSPHATE AND BETAMETHASONE ACETATE 12 MG: 3; 3 INJECTION, SUSPENSION INTRA-ARTICULAR; INTRALESIONAL; INTRAMUSCULAR; SOFT TISSUE at 08:11

## 2022-08-09 NOTE — PROGRESS NOTES
Assessment:  No diagnosis found  Plan:  The patient was provided with right pes anserine steroid injection and left pes anserine Toradol injection  The patient tolerated the procedure well  The patient is scheduled for left total knee arthroplasty revision  The patient should follow up in  Post-op  To do next visit:  Return for post-op with x-rays  The above stated was discussed in layman's terms and the patient expressed understanding  All questions were answered to the patient's satisfaction  Scribe Attestation    I,:  Shanna Foley am acting as a scribe while in the presence of the attending physician :       I,:  Thien Bradshaw MD personally performed the services described in this documentation    as scribed in my presence :             Subjective:   Luh Blair is a 65 y o  female who presents for follow up of bilateral knees  She is s/p bilateral pes anserine ad right IA knee steroid injections with limited benefit, 3/29/2022  Today she complains of left generalized and bilateral medial knee pain  Prolonged weight bearing and repetitive bending aggravates while rest alleviates  She does use Tylenol for pain  She does use a cane for ambulation  She has history of left TKA          Review of systems negative unless otherwise specified in HPI    Past Medical History:   Diagnosis Date    Actinic keratosis     last assessed - [id number]    Arthritis     Atrial fibrillation with rapid ventricular response (HCC)     last assessed - 26Apr2016    Basal cell carcinoma     Benign colon polyp     last assessed - 27Apr2015    Disease of thyroid gland     Effusion of knee joint right     Resolved - 19Apr2016    Esophageal reflux     Fibromyalgia     last assessed - [id number]    Fibromyalgia, primary     GERD (gastroesophageal reflux disease)     Hypertension     Palpitations     last assessed - 30Apr2013    Peroneal tendonitis, right     last assessed - 02Oct2013   [facility] Right lumbar radiculopathy 3/17/2016    Thyroid cancer (HCC)     Thyroid nodule     Trochanteric bursitis of left hip 3/9/2018       Past Surgical History:   Procedure Laterality Date    CATARACT EXTRACTION Bilateral     COLONOSCOPY  03/2018    EYE SURGERY      HYSTERECTOMY      JOINT REPLACEMENT Left     knee    LA REVISE MEDIAN N/CARPAL TUNNEL SURG Right 11/14/2019    Procedure: RELEASE CARPAL TUNNEL;  Surgeon: Asaf Madden MD;  [facility];  Service: Orthopedics    LA THYROID LOBECTOMY,UNILAT Left 12/16/2020    Procedure: Left THYROID LOBECTOMY;  Surgeon: Maude Hawk MD;  [facility];  Service: ENT    RECTAL POLYPECTOMY      REPLACEMENT TOTAL KNEE Left     last assessed - [id number]    TONSILLECTOMY      TOTAL ABDOMINAL HYSTERECTOMY      TUBAL LIGATION      US GUIDED THYROID BIOPSY  10/14/2020       Family History   Problem Relation Age of Onset    Heart disease Mother     Diabetes Mother     Heart disease Father     Coronary artery disease Father     Stroke Father         cerebrovascular accident    Heart attack Father         myocardial infarction    Sudden death Father         scd    Other Family         Back disorder    Coronary artery disease Family     Neuropathy Family     Osteoporosis Family     No Known Problems Daughter     No Known Problems Maternal Grandmother     No Known Problems Maternal Grandfather     No Known Problems Paternal Grandmother     No Known Problems Paternal Grandfather     Cancer Maternal Uncle     Breast cancer Maternal Aunt 72    No Known Problems Son     No Known Problems Maternal Aunt     No Known Problems Maternal Aunt     No Known Problems Maternal Aunt     No Known Problems Paternal Aunt     No Known Problems Paternal Aunt     Anuerysm Neg Hx     Clotting disorder Neg Hx     Arrhythmia Neg Hx     Heart failure Neg Hx        Social History     Occupational History    Not on file   Tobacco Use    Smoking status: Never Smoker    Smokeless tobacco: Never Used   Vaping Use    Vaping Use: Never used   Substance and Sexual Activity    Alcohol use: Not Currently     Comment: occosional - every 3 months    Drug use: Never    Sexual activity: Not Currently         Current Outpatient Medications:     aspirin 81 mg chewable tablet, Chew 81 mg daily, Disp: , Rfl:     pantoprazole (PROTONIX) 20 mg tablet, Take 1 tablet by mouth daily before breakfast, Disp: , Rfl:     amiodarone 200 mg tablet, Take 1 tablet (200 mg total) by mouth daily (Patient taking differently: Take 100 mg by mouth daily), Disp: 90 tablet, Rfl: 2    apixaban (ELIQUIS) 5 mg, Take 1 tablet (5 mg total) by mouth 2 (two) times a day, Disp: 56 tablet, Rfl: 0    ascorbic acid (VITAMIN C) 500 mg tablet, Take 500 mg by mouth daily , Disp: , Rfl:     Cholecalciferol 50 MCG (2000 UT) CAPS, Take 2,000 Units by mouth 2 (two) times a day  , Disp: , Rfl:     Chromium Picolinate (CHROMIUM PICOLATE PO), Take 1 tablet by mouth daily, Disp: , Rfl:     digoxin (LANOXIN) 0 125 mg tablet, Take 1 tablet ( 125 mcg total) by mouth every other day, Disp: 90 tablet, Rfl: 3    DULoxetine (CYMBALTA) 30 mg delayed release capsule, Take 1 capsule (30 mg total) by mouth daily after breakfast, Disp: 30 capsule, Rfl: 2    ezetimibe (ZETIA) 10 mg tablet, Take 1 tablet (10 mg total) by mouth daily, Disp: 90 tablet, Rfl: 3    famotidine (PEPCID) 20 mg tablet, Take 20 mg by mouth every other day  , Disp: , Rfl:     furosemide (LASIX) 20 mg tablet, Take one tab daily when needed for shortness of breath, weight gain 3 pounds in one day or Lower leg swelling , Disp: 10 tablet, Rfl: 3    gabapentin (NEURONTIN) 300 mg capsule, Take 1 capsule (300 mg total) by mouth daily at bedtime, Disp: 30 capsule, Rfl: 0    glimepiride (AMARYL) 1 mg tablet, Take 1 tablet (1 mg total) by mouth daily with breakfast, Disp: 30 tablet, Rfl: 5    ipratropium (ATROVENT) 0 03 % nasal spray, 2 sprays into each nostril 3 (three) times a day Begin once per day, then progress to twice per day  Progress to three times a day as tolerated  , Disp: 90 mL, Rfl: 3    lidocaine (XYLOCAINE) 5 % ointment, Apply topically as needed for mild pain, Disp: 35 44 g, Rfl: 0    losartan (COZAAR) 25 mg tablet, Take 25 mg by mouth 2 (two) times a day, Disp: , Rfl:     losartan (COZAAR) 50 mg tablet, Take one  50 mg tablet in addition to a 25 mg tablet twice a day (Patient taking differently: Take 50 mg by mouth 2 (two) times a day With 25 mg tablet twice a day to equal 75 mg 2 times day), Disp: 180 tablet, Rfl: 1    metoprolol succinate (TOPROL-XL) 25 mg 24 hr tablet, Take 5 tablets (125 mg total) by mouth every 12 (twelve) hours for 15 days, Disp: 150 tablet, Rfl: 0    omeprazole (PriLOSEC) 20 mg delayed release capsule, , Disp: , Rfl:     pantoprazole (PROTONIX) 20 mg tablet, Take 20 mg by mouth daily before breakfast, Disp: , Rfl:     rOPINIRole (REQUIP) 0 5 mg tablet, Take 1 tablet (0 5 mg total) by mouth daily at bedtime, Disp: 90 tablet, Rfl: 3    traMADol (ULTRAM) 50 mg tablet, Take 1 tablet (50 mg total) by mouth 2 (two) times a day as needed for moderate pain, Disp: 60 tablet, Rfl: 3    TURMERIC PO, Take 1 capsule by mouth 2 (two) times a day, Disp: , Rfl:     zolpidem (AMBIEN) 10 mg tablet, Take 1 tablet (10 mg total) by mouth daily at bedtime as needed for sleep, Disp: 90 tablet, Rfl: 1    Allergies   Allergen Reactions    Sotalol Other (See Comments)     Prolonged QTc leading to torsades de pointes     Penicillins Other (See Comments)     As a child calcium deposit in the arm     Ace Inhibitors GI Intolerance     Did feel well on it    Omeprazole Abdominal Pain, Rash and Vomiting     stomach pain, vomiting, rash            Vitals:    08/09/22 0749   BP: 159/75   Pulse: 68       Objective:  Physical exam  · General: Awake, Alert, Oriented  · Eyes: Pupils equal, round and reactive to light  · Heart: regular rate and rhythm  · Lungs: No audible wheezing  · Abdomen: soft                    Ortho Exam  Right knee:  TTP over pes anserine  No erythema or ecchymosis  No effusion or swelling  Normal strength  Good ROM with crepitus   Calf compartments soft and supple  Sensation intact  Toes are warm sensate and mobile    Left knee:  Well healed anterior incision  Laxity with varus and valgus stress  No erythema and no ecchymosis  Extensor mechanism intact  Good ROM  Calf compartments soft and supple  Sensation intact  Toes are warm sensate and mobile      Diagnostics, reviewed and taken today if performed as documented:    None performed     Procedures, if performed today:    Large joint arthrocentesis: R pes anserine bursa  Universal Protocol:  Consent: Verbal consent obtained  Risks and benefits: risks, benefits and alternatives were discussed  Consent given by: patient  Time out: Immediately prior to procedure a "time out" was called to verify the correct patient, procedure, equipment, support staff and site/side marked as required  Timeout called at: 8/9/2022 8:09 AM   Patient understanding: patient states understanding of the procedure being performed  Site marked: the operative site was marked  Patient identity confirmed: verbally with patient    Supporting Documentation  Indications: pain   Procedure Details  Location: knee - R pes anserine bursa  Preparation: Patient was prepped and draped in the usual sterile fashion  Needle size: 22 G  Ultrasound guidance: no  Approach: anterolateral  Medications administered: 12 mg betamethasone acetate-betamethasone sodium phosphate 6 (3-3) mg/mL; 2 mL bupivacaine 0 25 %; 2 mL lidocaine 1 %    Patient tolerance: patient tolerated the procedure well with no immediate complications  Dressing:  Sterile dressing applied    Large joint arthrocentesis: L pes anserine bursa  Universal Protocol:  Consent: Verbal consent obtained    Risks and benefits: risks, benefits and alternatives were discussed  Consent given by: patient  Time out: Immediately prior to procedure a "time out" was called to verify the correct patient, procedure, equipment, support staff and site/side marked as required  Timeout called at: 8/9/2022 8:09 AM   Patient understanding: patient states understanding of the procedure being performed  Site marked: the operative site was marked  Patient identity confirmed: verbally with patient    Supporting Documentation  Indications: pain   Procedure Details  Location: knee - L pes anserine bursa  Preparation: Patient was prepped and draped in the usual sterile fashion  Needle size: 22 G  Ultrasound guidance: no  Approach: anterolateral  Medications administered: 2 mL bupivacaine 0 25 %; 2 mL lidocaine 1 %; 60 mg ketorolac 60 mg/2 mL    Patient tolerance: patient tolerated the procedure well with no immediate complications  Dressing:  Sterile dressing applied            Portions of the record may have been created with voice recognition software  Occasional wrong word or "sound a like" substitutions may have occurred due to the inherent limitations of voice recognition software  Read the chart carefully and recognize, using context, where substitutions have occurred

## 2022-08-09 NOTE — PROGRESS NOTES
Assessment:  1  Chronic pain syndrome    2  Peripheral polyneuropathy    3  Lumbar spondylosis    4  Fibromyalgia        Plan:  The patient is a 79-year-old female with a history of chronic pain secondary to low back pain, lumbar spondylosis and fibromyalgia who presents to the office with improved but ongoing bilateral low back pain and burning around her waist when she sits down  She continues to receive relief of her pain taking gabapentin, therefore I will continue her on this medication as prescribed  A script for gabapentin 300 mg with 5 refills was electronically sent to the patient's pharmacy  I instructed patient we will continue her at this dose as she was decreased after her cardiac procedure  My impressions and treatment recommendations were discussed in detail with the patient who verbalized understanding and had no further questions  Discharge instructions were provided  I personally saw and examined the patient and I agree with the above discussed plan of care  No orders of the defined types were placed in this encounter  New Medications Ordered This Visit   Medications    gabapentin (NEURONTIN) 300 mg capsule     Sig: Take 1 capsule (300 mg total) by mouth daily at bedtime     Dispense:  30 capsule     Refill:  5       History of Present Illness:  Maria Luisa Chamberlain is a 65 y o  female with a history of chronic pain secondary to low back pain, lumbar spondylosis and fibromyalgia  She was last seen on 01/12/2021 where she was continued on gabapentin 300 mg 1 tablet twice a day  Rodney Mckeon She was recently in the hospital where she was diagnosed with heart failure and underwent a cardiac procedure, at that time her gabapentin was decreased to 300 mg daily at bedtime  She states she has been doing well on this dose  She states her pain is improved since the last office visit and occasional but worse in the morning  She does states she gets a burning sensation around her waist when she sits  She describes the quality of her pain as burning and pressure-like and is currently rating it a 0/10 on a numeric scale  Current pain medications include gabapentin 300 mg 1 tablet before bed  She states this medication regimen provides her 75% relief of her pain symptoms without side effects  I have personally reviewed and/or updated the patient's past medical history, past surgical history, family history, social history, current medications, allergies, and vital signs today  Review of Systems   Respiratory: Negative for shortness of breath  Cardiovascular: Negative for chest pain  Gastrointestinal: Negative for constipation, diarrhea, nausea and vomiting  Musculoskeletal: Positive for back pain, gait problem and joint swelling  Negative for arthralgias and myalgias  Skin: Negative for rash  Neurological: Negative for dizziness, seizures and weakness  All other systems reviewed and are negative        Patient Active Problem List   Diagnosis    Essential hypertension    Allergic rhinitis    Fibromyalgia    Hyperlipidemia    Insomnia    Lumbar spondylosis    Lumbar stenosis    Osteoarthrosis, hand    Osteoarthritis of knee    Osteopenia    Paroxysmal atrial fibrillation (HCC)    Peripheral neuropathy    Restless legs syndrome    TMJ syndrome    Vitamin D deficiency    Osteoarthritis of shoulder    Carpal tunnel syndrome on right    Arthritis of both acromioclavicular joints    Chronic rhinitis    Chronic diastolic CHF (congestive heart failure) ([facility] [address]    Malignant neoplasm of thyroid gland (HCC)    Current use of long term anticoagulation    S/P placement of cardiac pacemaker    Tremors of nervous system    Hx of diplopia    Hurthle cell adenoma of thyroid    MGUS (monoclonal gammopathy of unknown significance)    Vasomotor rhinitis    Chronic tension-type headache, not intractable    Stage 3b chronic kidney disease (HCC)    Meningioma (HCC)    Continuous opioid dependence ([facility] [address]    Acute heart failure ([facility] )    Hypomagnesemia    Hyperbilirubinemia    Confusion    Pulmonary hypertension (HCC)       Past Medical History:   Diagnosis Date    Actinic keratosis     last assessed - 06Jun2014    Arthritis     Atrial fibrillation with rapid ventricular response (HCC)     last assessed - 26Apr2016    Basal cell carcinoma     Benign colon polyp     last assessed - 27Apr2015    Disease of thyroid gland     Effusion of knee joint right     Resolved - 19Apr2016    Esophageal reflux     Fibromyalgia     last assessed - [id number]    Fibromyalgia, primary     GERD (gastroesophageal reflux disease)     Hypertension     Palpitations     last assessed - 30Apr2013    Peroneal tendonitis, right     last assessed - 02Oct2013    Right lumbar radiculopathy 3/17/2016    Thyroid cancer ([facility] )     Thyroid nodule     Trochanteric bursitis of left hip 3/9/2018       Past Surgical History:   Procedure Laterality Date    CATARACT EXTRACTION Bilateral     COLONOSCOPY  03/2018    EYE SURGERY      HYSTERECTOMY      JOINT REPLACEMENT Left     knee    OK REVISE MEDIAN N/CARPAL TUNNEL SURG Right 11/14/2019    Procedure: RELEASE CARPAL TUNNEL;  Surgeon: Darío Hammer MD;  [facility];  Service: Orthopedics    OK THYROID LOBECTOMY,UNILAT Left 12/16/2020    Procedure: Left THYROID LOBECTOMY;  Surgeon: Fabian Ivy MD;  [facility];  Service: ENT    RECTAL POLYPECTOMY      REPLACEMENT TOTAL KNEE Left     last assessed - 27Apr2015    TONSILLECTOMY      TOTAL ABDOMINAL HYSTERECTOMY      TUBAL LIGATION      US GUIDED THYROID BIOPSY  10/14/2020       Family History   Problem Relation Age of Onset    Heart disease Mother     Diabetes Mother     Heart disease Father     Coronary artery disease Father     Stroke Father         cerebrovascular accident    Heart attack Father         myocardial infarction    Sudden death Father         scd    Other Family Back disorder    Coronary artery disease Family     Neuropathy Family     Osteoporosis Family     No Known Problems Daughter     No Known Problems Maternal Grandmother     No Known Problems Maternal Grandfather     No Known Problems Paternal Grandmother     No Known Problems Paternal Grandfather     Cancer Maternal Uncle     Breast cancer Maternal Aunt 72    No Known Problems Son     No Known Problems Maternal Aunt     No Known Problems Maternal Aunt     No Known Problems Maternal Aunt     No Known Problems Paternal Aunt     No Known Problems Paternal Aunt     Anuerysm Neg Hx     Clotting disorder Neg Hx     Arrhythmia Neg Hx     Heart failure Neg Hx        Social History     Occupational History    Not on file   Tobacco Use    Smoking status: Never Smoker    Smokeless tobacco: Never Used   Vaping Use    Vaping Use: Never used   Substance and Sexual Activity    Alcohol use: Not Currently     Comment: occosional - every 3 months    Drug use: Never    Sexual activity: Not Currently       Current Outpatient Medications on File Prior to Visit   Medication Sig    amiodarone 200 mg tablet Take 1 tablet (200 mg total) by mouth daily (Patient taking differently: Take 100 mg by mouth daily)    apixaban (ELIQUIS) 5 mg Take 1 tablet (5 mg total) by mouth 2 (two) times a day    ascorbic acid (VITAMIN C) 500 mg tablet Take 500 mg by mouth daily     Cholecalciferol 50 MCG (2000 UT) CAPS Take 2,000 Units by mouth 2 (two) times a day      Chromium Picolinate (CHROMIUM PICOLATE PO) Take 1 tablet by mouth daily    digoxin (LANOXIN) 0 125 mg tablet Take 1 tablet ( 125 mcg total) by mouth every other day    DULoxetine (CYMBALTA) 30 mg delayed release capsule Take 1 capsule (30 mg total) by mouth daily after breakfast    ezetimibe (ZETIA) 10 mg tablet Take 1 tablet (10 mg total) by mouth daily    famotidine (PEPCID) 20 mg tablet Take 20 mg by mouth every other day      furosemide (LASIX) 20 mg tablet Take one tab daily when needed for shortness of breath, weight gain 3 pounds in one day or Lower leg swelling   glimepiride (AMARYL) 1 mg tablet Take 1 tablet (1 mg total) by mouth daily with breakfast    ipratropium (ATROVENT) 0 03 % nasal spray 2 sprays into each nostril 3 (three) times a day Begin once per day, then progress to twice per day  Progress to three times a day as tolerated   lidocaine (XYLOCAINE) 5 % ointment Apply topically as needed for mild pain    losartan (COZAAR) 25 mg tablet Take 25 mg by mouth 2 (two) times a day    losartan (COZAAR) 50 mg tablet Take one  50 mg tablet in addition to a 25 mg tablet twice a day (Patient taking differently: Take 50 mg by mouth 2 (two) times a day With 25 mg tablet twice a day to equal 75 mg 2 times day)    rOPINIRole (REQUIP) 0 5 mg tablet Take 1 tablet (0 5 mg total) by mouth daily at bedtime    traMADol (ULTRAM) 50 mg tablet Take 1 tablet (50 mg total) by mouth 2 (two) times a day as needed for moderate pain    TURMERIC PO Take 1 capsule by mouth 2 (two) times a day    zolpidem (AMBIEN) 10 mg tablet Take 1 tablet (10 mg total) by mouth daily at bedtime as needed for sleep    [DISCONTINUED] gabapentin (NEURONTIN) 300 mg capsule Take 1 capsule (300 mg total) by mouth daily at bedtime    metoprolol succinate (TOPROL-XL) 25 mg 24 hr tablet Take 5 tablets (125 mg total) by mouth every 12 (twelve) hours for 15 days     No current facility-administered medications on file prior to visit         Allergies   Allergen Reactions    Sotalol Other (See Comments)     Prolonged QTc leading to torsades de pointes     Penicillins Other (See Comments)     As a child calcium deposit in the arm     Ace Inhibitors GI Intolerance     Did feel well on it    Omeprazole Abdominal Pain, Rash and Vomiting     stomach pain, vomiting, rash       Physical Exam:    BP (!) 172/84   Pulse 64   Resp 16   Ht 5' 2" (1 575 m)   Wt 72 6 kg (160 lb)   LMP 02/01/1990 (Within Weeks)   BMI 29 26 kg/m²     Constitutional:normal, well developed, well nourished, alert, in no distress and non-toxic and no overt pain behavior    Eyes:anicteric  HEENT:grossly intact  Neck:supple, symmetric, trachea midline and no masses   Pulmonary:even and unlabored  Cardiovascular:No edema or pitting edema present  Skin:Normal without rashes or lesions and well hydrated  Psychiatric:Mood and affect appropriate  Neurologic:Cranial Nerves II-XII grossly intact  Musculoskeletal:normal    Imaging

## 2022-08-15 NOTE — TELEPHONE ENCOUNTER
Script for steroid inhaler sent to pharmacy Cimzia Counseling:  I discussed with the patient the risks of Cimzia including but not limited to immunosuppression, allergic reactions and infections.  The patient understands that monitoring is required including a PPD at baseline and must alert us or the primary physician if symptoms of infection or other concerning signs are noted.

## 2022-08-16 ENCOUNTER — TELEPHONE (OUTPATIENT)
Dept: OBGYN CLINIC | Facility: HOSPITAL | Age: 77
End: 2022-08-16

## 2022-08-16 DIAGNOSIS — M25.362 KNEE INSTABILITY, LEFT: Primary | ICD-10-CM

## 2022-08-16 NOTE — TELEPHONE ENCOUNTER
Message reviewed  Office note of August 9th reviewed    I called the patient  The patient's cardiologist has recommended abstinence from surgery, for at least 3 months    The patient would like pain relief in knee stability,   Perhaps a brace    A brace may cause edema or fluid increased to her calf foot and ankle,     In a patient who already has difficulty with fluid or retaining fluid control due to her heart  She will consult her cardiologist to find out if bracing would be beneficial or detrimental    Dr Aruna Gilliam has recommended a low profile knee sleeve for the left knee,   If the patient desires      The patient will return a call tomorrow,   And I have ordered the sleeve in the interim should she desire to have it    MARYBETH

## 2022-08-16 NOTE — TELEPHONE ENCOUNTER
Dr Cedric Green    Patient is asking if there is any type of wrap or brace she can wear for the pain in her knee? Can this be prescribed?      # [id number]

## 2022-08-17 ENCOUNTER — TELEPHONE (OUTPATIENT)
Dept: CARDIOLOGY CLINIC | Facility: CLINIC | Age: 77
End: 2022-08-17

## 2022-08-17 NOTE — TELEPHONE ENCOUNTER
Patient is calling back about the brace, she found one that is a brace with straps above and below the knee with an opening where the knee is  She states she got a brace from dr Mark Juarez years ago, Mundo with dry karla, she found this and will call back after her cardiologist calls her

## 2022-08-17 NOTE — TELEPHONE ENCOUNTER
Patient calling back stating the her cardiologist stated that it was ok to wear the brace    She wanted to notify clinical team

## 2022-08-17 NOTE — TELEPHONE ENCOUNTER
Beverly Sawant was advised to call you regarding a concern about her left knee instability  She asked her orthopedic surgeon if a brace for her knee is recommended  Per ortho PA note, she was advised to call cardiology due to the fact that a brace on her knee may cause edema in the foot, calf and ankle  And since she sometimes experiences edema in her ankles already, takes lasix prn, is there any concern from a cardiac perspective for wearing a brace on the left knee? Please advise

## 2022-08-22 ENCOUNTER — TELEPHONE (OUTPATIENT)
Dept: FAMILY MEDICINE CLINIC | Facility: CLINIC | Age: 77
End: 2022-08-22

## 2022-08-22 NOTE — TELEPHONE ENCOUNTER
Pt has hip bursitis  Because of this, her thigh muscle is very sore most of the time  She is requesting acupuncture  Is it OK to schedule her for this?

## 2022-08-23 DIAGNOSIS — I48.0 PAROXYSMAL ATRIAL FIBRILLATION (HCC): ICD-10-CM

## 2022-08-24 ENCOUNTER — PATIENT OUTREACH (OUTPATIENT)
Dept: FAMILY MEDICINE CLINIC | Facility: CLINIC | Age: 77
End: 2022-08-24

## 2022-08-24 ENCOUNTER — TELEPHONE (OUTPATIENT)
Dept: OBGYN CLINIC | Facility: HOSPITAL | Age: 77
End: 2022-08-24

## 2022-08-24 DIAGNOSIS — I48.0 PAROXYSMAL ATRIAL FIBRILLATION (HCC): ICD-10-CM

## 2022-08-24 RX ORDER — AMIODARONE HYDROCHLORIDE 200 MG/1
TABLET ORAL
Qty: 90 TABLET | Refills: 3 | Status: SHIPPED | OUTPATIENT
Start: 2022-08-24 | End: 2022-08-24 | Stop reason: SDUPTHER

## 2022-08-24 RX ORDER — AMIODARONE HYDROCHLORIDE 200 MG/1
200 TABLET ORAL DAILY
Qty: 90 TABLET | Refills: 3 | Status: SHIPPED | OUTPATIENT
Start: 2022-08-24 | End: 2022-11-22

## 2022-08-24 NOTE — PROGRESS NOTES
Spoke with Rogerio Martinez she is doing good  Weight today was 153 pounds taking lasix daily  Cancelled knee surgery per cardiology for now  She states she has had a cough for greater than 6 months  Cough is productive at times but mainly dry  Takes robitussin at times and it helps  Would like to know what else she can do for cough  I will check back in 2-3 weeks

## 2022-08-24 NOTE — TELEPHONE ENCOUNTER
Patient's care manager calling in stating that she just spoke with the patient about her upcoming surgery with Dr Michael Joel for her knee replacement  The patient told her that she is not having surgery on 9/26 because Cardiology told her to hold off on surgery at this time  The patient said she told Dr Michael Joel this at her last office visit when she received injections  Patient's surgery needs to be cancelled for 9/26

## 2022-08-29 DIAGNOSIS — I48.19 PERSISTENT ATRIAL FIBRILLATION (HCC): ICD-10-CM

## 2022-08-29 DIAGNOSIS — G47.01 INSOMNIA DUE TO MEDICAL CONDITION: ICD-10-CM

## 2022-08-29 RX ORDER — ZOLPIDEM TARTRATE 10 MG/1
10 TABLET ORAL
Qty: 90 TABLET | Refills: 1 | Status: SHIPPED | OUTPATIENT
Start: 2022-08-29

## 2022-08-29 RX ORDER — FUROSEMIDE 20 MG/1
TABLET ORAL
Qty: 10 TABLET | Refills: 3 | Status: SHIPPED | OUTPATIENT
Start: 2022-08-29

## 2022-08-29 NOTE — TELEPHONE ENCOUNTER
1  [id number] 05/09/2022 05/05/2022 Zolpidem Tartrate (Tablet)  90 0 90 10 MG NA KATLIN RICKS  [facility]  Medicare 00 / 1 [place]

## 2022-09-02 ENCOUNTER — PROCEDURE VISIT (OUTPATIENT)
Dept: FAMILY MEDICINE CLINIC | Facility: CLINIC | Age: 77
End: 2022-09-02
Payer: MEDICARE

## 2022-09-02 DIAGNOSIS — M70.62 TROCHANTERIC BURSITIS, LEFT HIP: Primary | ICD-10-CM

## 2022-09-02 DIAGNOSIS — M76.32 ILIOTIBIAL BAND SYNDROME OF LEFT SIDE: ICD-10-CM

## 2022-09-02 PROCEDURE — 20610 DRAIN/INJ JOINT/BURSA W/O US: CPT | Performed by: FAMILY MEDICINE

## 2022-09-02 PROCEDURE — 20550 NJX 1 TENDON SHEATH/LIGAMENT: CPT | Performed by: FAMILY MEDICINE

## 2022-09-02 RX ORDER — METHYLPREDNISOLONE ACETATE 40 MG/ML
80 INJECTION, SUSPENSION INTRA-ARTICULAR; INTRALESIONAL; INTRAMUSCULAR; SOFT TISSUE ONCE
Status: COMPLETED | OUTPATIENT
Start: 2022-09-02 | End: 2022-09-02

## 2022-09-02 RX ADMIN — METHYLPREDNISOLONE ACETATE 80 MG: 40 INJECTION, SUSPENSION INTRA-ARTICULAR; INTRALESIONAL; INTRAMUSCULAR; SOFT TISSUE at 10:02

## 2022-09-02 NOTE — PROGRESS NOTES
Here to discuss treatment options for L lateral hip/ L lateral thigh pain  She is using prn ES Tylenol for pain  On Eliquis for PAF  S/p L TKR  Prior L hip steroid injection w/ relief  PE moderate tenderness over L trochanteric bursa  + tenderness along the length of L IT band  ROM L hip normal      Diagnoses and all orders for this visit:    Trochanteric bursitis, left hip  -     Large joint arthrocentesis: L greater trochanteric bursa  -     methylPREDNISolone acetate (DEPO-MEDROL) injection 80 mg    Iliotibial band syndrome of left side  -     Injection tendon sheath ligament single  -     sarapin injection 12 mL    Other orders  -     Cancel: Trigger Point Injection  -     Cancel: Trigger Point Injection    see procedure notes  Consider trial of acupuncture for persistent symptoms     Large joint arthrocentesis: L greater trochanteric bursa  Universal Protocol:  Consent: Verbal consent obtained  Risks and benefits: risks, benefits and alternatives were discussed  Consent given by: patient  Site marked: the operative site was marked  Supporting Documentation  Indications: pain (L trochanteric bursitis )   Procedure Details  Location: hip - L greater trochanteric bursa  Preparation: Betadine   Needle size: 22 G  Ultrasound guidance: no  Approach: lateral    Patient tolerance: patient tolerated the procedure well with no immediate complications  Dressing:  Sterile dressing applied    DepoMedrol 40 mg/ML-2 ML and Lidocaine 1% 3 ML           Injection tendon sheath ligament single     Date/Time 9/2/2022 9:20 AM     Performed by  Berlin Krabbe, MD     Authorized by Berlin Krabbe, MD      Universal Protocol   Consent: Verbal consent obtained    Risks and benefits: risks, benefits and alternatives were discussed  Consent given by: patient        Local anesthesia used: yes      Anesthesia: local infiltration     Anesthesia   Local anesthesia used: yes  Local Anesthetic: lidocaine 1% without epinephrine Procedure Details   Procedure Notes: Using aseptic technique I injected 6 separate areas along L IT band   Sarapin 12 ML and Lidocaine 1% 6 ML   Patient tolerance: patient tolerated the procedure well with no immediate complications

## 2022-09-07 ENCOUNTER — TELEPHONE (OUTPATIENT)
Dept: FAMILY MEDICINE CLINIC | Facility: CLINIC | Age: 77
End: 2022-09-07

## 2022-09-07 DIAGNOSIS — I48.0 PAROXYSMAL ATRIAL FIBRILLATION (HCC): ICD-10-CM

## 2022-09-07 NOTE — TELEPHONE ENCOUNTER
Patient is taking Ambien at night before bed      She wants to know if she can also take a pill at night a  Gummie  Pill (melatonin) and its called SLEEP  half hour before bedtime at the same time

## 2022-09-15 ENCOUNTER — PATIENT OUTREACH (OUTPATIENT)
Dept: FAMILY MEDICINE CLINIC | Facility: CLINIC | Age: 77
End: 2022-09-15

## 2022-09-19 ENCOUNTER — OFFICE VISIT (OUTPATIENT)
Dept: FAMILY MEDICINE CLINIC | Facility: CLINIC | Age: 77
End: 2022-09-19
Payer: MEDICARE

## 2022-09-19 VITALS
SYSTOLIC BLOOD PRESSURE: 122 MMHG | HEART RATE: 60 BPM | BODY MASS INDEX: 29.26 KG/M2 | RESPIRATION RATE: 16 BRPM | HEIGHT: 62 IN | OXYGEN SATURATION: 97 % | WEIGHT: 159 LBS | DIASTOLIC BLOOD PRESSURE: 70 MMHG | TEMPERATURE: 96.5 F

## 2022-09-19 DIAGNOSIS — Z95.0 S/P PLACEMENT OF CARDIAC PACEMAKER: ICD-10-CM

## 2022-09-19 DIAGNOSIS — Z23 ENCOUNTER FOR IMMUNIZATION: ICD-10-CM

## 2022-09-19 DIAGNOSIS — D47.2 MGUS (MONOCLONAL GAMMOPATHY OF UNKNOWN SIGNIFICANCE): ICD-10-CM

## 2022-09-19 DIAGNOSIS — D32.9 MENINGIOMA (HCC): ICD-10-CM

## 2022-09-19 DIAGNOSIS — M17.11 PRIMARY OSTEOARTHRITIS OF RIGHT KNEE: ICD-10-CM

## 2022-09-19 DIAGNOSIS — E78.2 MIXED HYPERLIPIDEMIA: ICD-10-CM

## 2022-09-19 DIAGNOSIS — E11.9 TYPE 2 DIABETES MELLITUS WITHOUT COMPLICATION, WITHOUT LONG-TERM CURRENT USE OF INSULIN (HCC): Primary | ICD-10-CM

## 2022-09-19 DIAGNOSIS — G25.81 RESTLESS LEGS SYNDROME: ICD-10-CM

## 2022-09-19 DIAGNOSIS — N18.32 STAGE 3B CHRONIC KIDNEY DISEASE (HCC): ICD-10-CM

## 2022-09-19 DIAGNOSIS — Z00.00 MEDICARE ANNUAL WELLNESS VISIT, SUBSEQUENT: ICD-10-CM

## 2022-09-19 DIAGNOSIS — I10 ESSENTIAL HYPERTENSION: ICD-10-CM

## 2022-09-19 DIAGNOSIS — I50.32 CHRONIC DIASTOLIC CHF (CONGESTIVE HEART FAILURE) (HCC): ICD-10-CM

## 2022-09-19 DIAGNOSIS — I48.0 PAROXYSMAL ATRIAL FIBRILLATION (HCC): ICD-10-CM

## 2022-09-19 PROBLEM — E80.6 HYPERBILIRUBINEMIA: Status: RESOLVED | Noted: 2022-06-13 | Resolved: 2022-09-19

## 2022-09-19 PROBLEM — R41.0 CONFUSION: Status: RESOLVED | Noted: 2022-06-14 | Resolved: 2022-09-19

## 2022-09-19 PROBLEM — I50.9 ACUTE HEART FAILURE (HCC): Status: RESOLVED | Noted: 2022-06-13 | Resolved: 2022-09-19

## 2022-09-19 PROCEDURE — 99214 OFFICE O/P EST MOD 30 MIN: CPT | Performed by: FAMILY MEDICINE

## 2022-09-19 PROCEDURE — G0008 ADMIN INFLUENZA VIRUS VAC: HCPCS

## 2022-09-19 PROCEDURE — 90662 IIV NO PRSV INCREASED AG IM: CPT

## 2022-09-19 PROCEDURE — G0439 PPPS, SUBSEQ VISIT: HCPCS | Performed by: FAMILY MEDICINE

## 2022-09-19 NOTE — PATIENT INSTRUCTIONS
Medicare Preventive Visit Patient Instructions  Thank you for completing your Welcome to Medicare Visit or Medicare Annual Wellness Visit today  Your next wellness visit will be due in one year (9/20/2023)  The screening/preventive services that you may require over the next 5-10 years are detailed below  Some tests may not apply to you based off risk factors and/or age  Screening tests ordered at today's visit but not completed yet may show as past due  Also, please note that scanned in results may not display below  Preventive Screenings:  Service Recommendations Previous Testing/Comments   Colorectal Cancer Screening  * Colonoscopy    * Fecal Occult Blood Test (FOBT)/Fecal Immunochemical Test (FIT)  * Fecal DNA/Cologuard Test  * Flexible Sigmoidoscopy Age: 39-70 years old   Colonoscopy: every 10 years (may be performed more frequently if at higher risk)  OR  FOBT/FIT: every 1 year  OR  Cologuard: every 3 years  OR  Sigmoidoscopy: every 5 years  Screening may be recommended earlier than age 39 if at higher risk for colorectal cancer  Also, an individualized decision between you and your healthcare provider will decide whether screening between the ages of 74-80 would be appropriate  Colonoscopy: 08/31/2021  FOBT/FIT: Not on file  Cologuard: Not on file  Sigmoidoscopy: Not on file          Breast Cancer Screening Age: 36 years old  Frequency: every 1-2 years  Not required if history of left and right mastectomy Mammogram: 02/17/2022    Screening Current   Cervical Cancer Screening Between the ages of 21-29, pap smear recommended once every 3 years  Between the ages of 33-67, can perform pap smear with HPV co-testing every 5 years     Recommendations may differ for women with a history of total hysterectomy, cervical cancer, or abnormal pap smears in past  Pap Smear: 10/04/2021    Screening Not Indicated   Hepatitis C Screening Once for adults born between 1945 and 1965  More frequently in patients at high risk for Hepatitis C Hep C Antibody: Not on file    Screening Current   Diabetes Screening 1-2 times per year if you're at risk for diabetes or have pre-diabetes Fasting glucose: 160 mg/dL (7/11/2022)  A1C: 7 3 % (7/11/2022)  Screening Not Indicated  History Diabetes   Cholesterol Screening Once every 5 years if you don't have a lipid disorder  May order more often based on risk factors  Lipid panel: 04/01/2021    Screening Not Indicated  History Lipid Disorder     Other Preventive Screenings Covered by Medicare:  1  Abdominal Aortic Aneurysm (AAA) Screening: covered once if your at risk  You're considered to be at risk if you have a family history of AAA  2  Lung Cancer Screening: covers low dose CT scan once per year if you meet all of the following conditions: (1) Age 50-69; (2) No signs or symptoms of lung cancer; (3) Current smoker or have quit smoking within the last 15 years; (4) You have a tobacco smoking history of at least 20 pack years (packs per day multiplied by number of years you smoked); (5) You get a written order from a healthcare provider  3  Glaucoma Screening: covered annually if you're considered high risk: (1) You have diabetes OR (2) Family history of glaucoma OR (3)  aged 48 and older OR (3)  American aged 72 and older  3  Osteoporosis Screening: covered every 2 years if you meet one of the following conditions: (1) You're estrogen deficient and at risk for osteoporosis based off medical history and other findings; (2) Have a vertebral abnormality; (3) On glucocorticoid therapy for more than 3 months; (4) Have primary hyperparathyroidism; (5) On osteoporosis medications and need to assess response to drug therapy  · Last bone density test (DXA Scan): 02/17/2022   5  HIV Screening: covered annually if you're between the age of 15-65  Also covered annually if you are younger than 13 and older than 72 with risk factors for HIV infection   For pregnant patients, it is covered up to 3 times per pregnancy  Immunizations:  Immunization Recommendations   Influenza Vaccine Annual influenza vaccination during flu season is recommended for all persons aged >= 6 months who do not have contraindications   Pneumococcal Vaccine   * Pneumococcal conjugate vaccine = PCV13 (Prevnar 13), PCV15 (Vaxneuvance), PCV20 (Prevnar 20)  * Pneumococcal polysaccharide vaccine = PPSV23 (Pneumovax) Adults 25-60 years old: 1-3 doses may be recommended based on certain risk factors  Adults 72 years old: 1-2 doses may be recommended based off what pneumonia vaccine you previously received   Hepatitis B Vaccine 3 dose series if at intermediate or high risk (ex: diabetes, end stage renal disease, liver disease)   Tetanus (Td) Vaccine - COST NOT COVERED BY MEDICARE PART B Following completion of primary series, a booster dose should be given every 10 years to maintain immunity against tetanus  Td may also be given as tetanus wound prophylaxis  Tdap Vaccine - COST NOT COVERED BY MEDICARE PART B Recommended at least once for all adults  For pregnant patients, recommended with each pregnancy  Shingles Vaccine (Shingrix) - COST NOT COVERED BY MEDICARE PART B  2 shot series recommended in those aged 48 and above     Health Maintenance Due:      Topic Date Due    Hepatitis C Screening  07/29/2024 (Originally 1945)    Breast Cancer Screening: Mammogram  02/17/2023     Immunizations Due:      Topic Date Due    COVID-19 Vaccine (3 - Booster for Moderna series) 07/24/2021    Influenza Vaccine (1) 09/01/2022     Advance Directives   What are advance directives? Advance directives are legal documents that state your wishes and plans for medical care  These plans are made ahead of time in case you lose your ability to make decisions for yourself  Advance directives can apply to any medical decision, such as the treatments you want, and if you want to donate organs  What are the types of advance directives? There are many types of advance directives, and each state has rules about how to use them  You may choose a combination of any of the following:  · Living will: This is a written record of the treatment you want  You can also choose which treatments you do not want, which to limit, and which to stop at a certain time  This includes surgery, medicine, IV fluid, and tube feedings  · Durable power of  for healthcare [facility]): This is a written record that states who you want to make healthcare choices for you when you are unable to make them for yourself  This person, called a proxy, is usually a family member or a friend  You may choose more than 1 proxy  · Do not resuscitate (DNR) order:  A DNR order is used in case your heart stops beating or you stop breathing  It is a request not to have certain forms of treatment, such as CPR  A DNR order may be included in other types of advance directives  · Medical directive: This covers the care that you want if you are in a coma, near death, or unable to make decisions for yourself  You can list the treatments you want for each condition  Treatment may include pain medicine, surgery, blood transfusions, dialysis, IV or tube feedings, and a ventilator (breathing machine)  · Values history: This document has questions about your views, beliefs, and how you feel and think about life  This information can help others choose the care that you would choose  Why are advance directives important? An advance directive helps you control your care  Although spoken wishes may be used, it is better to have your wishes written down  Spoken wishes can be misunderstood, or not followed  Treatments may be given even if you do not want them  An advance directive may make it easier for your family to make difficult choices about your care  Fall Prevention    Fall prevention  includes ways to make your home and other areas safer   It also includes ways you can move more carefully to prevent a fall  Health conditions that cause changes in your blood pressure, vision, or muscle strength and coordination may increase your risk for falls  Medicines may also increase your risk for falls if they make you dizzy, weak, or sleepy  Fall prevention tips:   · Stand or sit up slowly  · Use assistive devices as directed  · Wear shoes that fit well and have soles that   · Wear a personal alarm  · Stay active  · Manage your medical conditions  Home Safety Tips:  · Add items to prevent falls in the bathroom  · Keep paths clear  · Install bright lights in your home  · Keep items you use often on shelves within reach  · Paint or place reflective tape on the edges of your stairs  Urinary Incontinence   Urinary incontinence (UI)  is when you lose control of your bladder  UI develops because your bladder cannot store or empty urine properly  The 3 most common types of UI are stress incontinence, urge incontinence, or both  Medicines:   · May be given to help strengthen your bladder control  Report any side effects of medication to your healthcare provider  Do pelvic muscle exercises often:  Your pelvic muscles help you stop urinating  Squeeze these muscles tight for 5 seconds, then relax for 5 seconds  Gradually work up to squeezing for 10 seconds  Do 3 sets of 15 repetitions a day, or as directed  This will help strengthen your pelvic muscles and improve bladder control  Train your bladder:  Go to the bathroom at set times, such as every 2 hours, even if you do not feel the urge to go  You can also try to hold your urine when you feel the urge to go  For example, hold your urine for 5 minutes when you feel the urge to go  As that becomes easier, hold your urine for 10 minutes  Self-care:   · Keep a UI record  Write down how often you leak urine and how much you leak  Make a note of what you were doing when you leaked urine  · Drink liquids as directed   You may need to limit the amount of liquid you drink to help control your urine leakage  Do not drink any liquid right before you go to bed  Limit or do not have drinks that contain caffeine or alcohol  · Prevent constipation  Eat a variety of high-fiber foods  Good examples are high-fiber cereals, beans, vegetables, and whole-grain breads  Walking is the best way to trigger your intestines to have a bowel movement  · Exercise regularly and maintain a healthy weight  Weight loss and exercise will decrease pressure on your bladder and help you control your leakage  · Use a catheter as directed  to help empty your bladder  A catheter is a tiny, plastic tube that is put into your bladder to drain your urine  · Go to behavior therapy as directed  Behavior therapy may be used to help you learn to control your urge to urinate  Weight Management   Why it is important to manage your weight:  Being overweight increases your risk of health conditions such as heart disease, high blood pressure, type 2 diabetes, and certain types of cancer  It can also increase your risk for osteoarthritis, sleep apnea, and other respiratory problems  Aim for a slow, steady weight loss  Even a small amount of weight loss can lower your risk of health problems  How to lose weight safely:  A safe and healthy way to lose weight is to eat fewer calories and get regular exercise  You can lose up about 1 pound a week by decreasing the number of calories you eat by 500 calories each day  Healthy meal plan for weight management:  A healthy meal plan includes a variety of foods, contains fewer calories, and helps you stay healthy  A healthy meal plan includes the following:  · Eat whole-grain foods more often  A healthy meal plan should contain fiber  Fiber is the part of grains, fruits, and vegetables that is not broken down by your body  Whole-grain foods are healthy and provide extra fiber in your diet   Some examples of whole-grain foods are whole-wheat breads and pastas, oatmeal, brown rice, and bulgur  · Eat a variety of vegetables every day  Include dark, leafy greens such as spinach, kale, anayeli greens, and mustard greens  Eat yellow and orange vegetables such as carrots, sweet potatoes, and winter squash  · Eat a variety of fruits every day  Choose fresh or canned fruit (canned in its own juice or light syrup) instead of juice  Fruit juice has very little or no fiber  · Eat low-fat dairy foods  Drink fat-free (skim) milk or 1% milk  Eat fat-free yogurt and low-fat cottage cheese  Try low-fat cheeses such as mozzarella and other reduced-fat cheeses  · Choose meat and other protein foods that are low in fat  Choose beans or other legumes such as split peas or lentils  Choose fish, skinless poultry (chicken or turkey), or lean cuts of red meat (beef or pork)  Before you cook meat or poultry, cut off any visible fat  · Use less fat and oil  Try baking foods instead of frying them  Add less fat, such as margarine, sour cream, regular salad dressing and mayonnaise to foods  Eat fewer high-fat foods  Some examples of high-fat foods include french fries, doughnuts, ice cream, and cakes  · Eat fewer sweets  Limit foods and drinks that are high in sugar  This includes candy, cookies, regular soda, and sweetened drinks  Exercise:  Exercise at least 30 minutes per day on most days of the week  Some examples of exercise include walking, biking, dancing, and swimming  You can also fit in more physical activity by taking the stairs instead of the elevator or parking farther away from stores  Ask your healthcare provider about the best exercise plan for you     Narcotic (Opioid) Safety    Use narcotics safely:  · Take prescribed narcotics exactly as directed  · Do not give narcotics to others or take narcotics that belong to someone else  · Do not mix narcotics without medicines or alcohol  · Do not drive or operate heavy machinery after you take the narcotic  · Monitor for side effects and notify your healthcare provider if you experienced side effects such as nausea, sleepiness, itching, or trouble thinking clearly  Manage constipation:    Constipation is the most common side effect of narcotic medicine  Constipation is when you have hard, dry bowel movements, or you go longer than usual between bowel movements  Tell your healthcare provider about all changes in your bowel movements while you are taking narcotics  He or she may recommend laxative medicine to help you have a bowel movement  He or she may also change the kind of narcotic you are taking, or change when you take it  The following are more ways you can prevent or relieve constipation:    · Drink liquids as directed  You may need to drink extra liquids to help soften and move your bowels  Ask how much liquid to drink each day and which liquids are best for you  · Eat high-fiber foods  This may help decrease constipation by adding bulk to your bowel movements  High-fiber foods include fruits, vegetables, whole-grain breads and cereals, and beans  Your healthcare provider or dietitian can help you create a high-fiber meal plan  Your provider may also recommend a fiber supplement if you cannot get enough fiber from food  · Exercise regularly  Regular physical activity can help stimulate your intestines  Walking is a good exercise to prevent or relieve constipation  Ask which exercises are best for you  · Schedule a time each day to have a bowel movement  This may help train your body to have regular bowel movements  Bend forward while you are on the toilet to help move the bowel movement out  Sit on the toilet for at least 10 minutes, even if you do not have a bowel movement  Store narcotics safely:   · Store narcotics where others cannot easily get them  Keep them in a locked cabinet or secure area  Do not  keep them in a purse or other bag you carry with you   A person may be looking for something else and find the narcotics  · Make sure narcotics are stored out of the reach of children  A child can easily overdose on narcotics  Narcotics may look like candy to a small child  The best way to dispose of narcotics: The laws vary by country and area  In the [place], the best way is to return the narcotics through a take-back program  This program is offered by the [employer]  The following are options for using the program:  · Take the narcotics to a RAN collection site  The site is often a law enforcement center  Call your local law enforcement center for scheduled take-back days in your area  You will be given information on where to go if the collection site is in a different location  · Take the narcotics to an approved pharmacy or hospital   A pharmacy or hospital may be set up as a collection site  You will need to ask if it is a RAN collection site if you were not directed there  A pharmacy or doctor's office may not be able to take back narcotics unless it is a RAN site  · Use a mail-back system  This means you are given containers to put the narcotics into  You will then mail them in the containers  · Use a take-back drop box  This is a place to leave the narcotics at any time  People and animals will not be able to get into the box  Your local law enforcement agency can tell you where to find a drop box in your area  Other ways to manage pain:   · Ask your healthcare provider about non-narcotic medicines to control pain  Nonprescription medicines include NSAIDs (such as ibuprofen) and acetaminophen  Prescription medicines include muscle relaxers, antidepressants, and steroids  · Pain may be managed without any medicines  Some ways to relieve pain include massage, aromatherapy, or meditation  Physical or occupational therapy may also help      For more information:   · Drug Enforcement Administration  [address]  Phone: 6- 356 - [phone]  Web Address: [facility] gov/drug_disposal/    · [address]  and Drug Administration  [place] , [address]  Phone: 5- 142 - [phone]  Web Address: http://[employer]/     © Copyright [employer] 2018 Information is for End User's use only and may not be sold, redistributed or otherwise used for commercial purposes  All illustrations and images included in CareNotes® are the copyrighted property of [employer]  or Jeff Anderson    Type 2 Diabetes Management for Adults   AMBULATORY CARE:   Type 2 diabetes  is a disease that affects how your body uses glucose (sugar)  Either your body cannot make enough insulin, or it cannot use the insulin correctly  It is important to keep diabetes controlled to prevent damage to your heart, blood vessels, and other organs  Have someone call your local emergency number (911 in the [address]) if:   · You cannot be woken  · You have signs of diabetic ketoacidosis:     ? confusion, fatigue    ? vomiting    ? rapid heartbeat    ? fruity smelling breath    ? extreme thirst    ? dry mouth and skin    · You have any of the following signs of a heart attack:      ? Squeezing, pressure, or pain in your chest    ? You may  also have any of the following:     § Discomfort or pain in your back, neck, jaw, stomach, or arm    § Shortness of breath    § Nausea or vomiting    § Lightheadedness or a sudden cold sweat    · You have any of the following signs of a stroke:      ? Numbness or drooping on one side of your face     ? Weakness in an arm or leg    ? Confusion or difficulty speaking    ? Dizziness, a severe headache, or vision loss    Call your doctor or diabetes care team if:   · You have a sore or wound that will not heal     · You have a change in the amount you urinate  · Your blood sugar levels are higher than your target goals      · You often have lower blood sugar levels than your target goals  · Your skin is red, dry, warm, or swollen  · You have trouble coping with diabetes, or you feel anxious or depressed  · You have questions or concerns about your condition or care  What you need to know about high blood sugar levels:  High blood sugar levels may not cause any symptoms  You may feel more thirsty or urinate more often than usual  Over time, high blood sugar levels can damage your nerves, blood vessels, tissues, and organs  The following can increase your blood sugar levels:  · Large meals or large amounts of carbohydrates at one time    · Less physical activity    · Stress    · Illness    · A lower dose of medicine or insulin, or a late dose    What you need to know about low blood sugar levels: You can prevent symptoms such as shakiness, dizziness, irritability, or confusion by preventing your blood sugar levels from going too low  · Treat a low blood sugar level right away  ? Drink 4 ounces of juice or have 1 tube of glucose gel  ? Check your blood sugar level again 10 to 15 minutes later  ? When the level goes back to normal, eat a meal or snack to prevent another decrease  · Keep glucose gel, raisins, or hard candy with you at all times to treat a low blood sugar level  · Your blood sugar level can get too low if you take diabetes medicine or insulin and do not eat enough food  · If you use insulin, check your blood sugar level before you exercise  ? If your blood sugar level is below 100 mg/dL, eat 4 crackers or 2 ounces of raisins, or drink 4 ounces of juice  ? Check your level every 30 minutes if you exercise more than 1 hour  ? You may need a snack during or after exercise  What you can do to manage your blood sugar levels:   · Check your blood sugar levels as directed and as needed  Several items are available to use to check your levels  You may need to check by testing a drop of blood in a glucose monitor  You may instead be given a continuous glucose monitoring (CGM) device  The device is worn at all times  The CGM checks your blood sugar level every 5 minutes  It sends results to an electronic device such as a smart phone  A CGM can be used with or without an insulin pump  Talk with your provider to find out which method is best for you  The goal for blood sugar levels before meals  is between 80 and 130 mg/dL and 2 hours after eating  is lower than 180 mg/dL  · Make healthy food choices  Work with a dietitian to develop a meal plan that works for you and your schedule  A dietitian can help you learn how to eat the right amount of carbohydrates during your meals and snacks  Carbohydrates can raise your blood sugar level if you eat too many at one time  Examples of foods that contain carbohydrates are breads, cereals, rice, pasta, and sweets  · Get regular physical activity  Physical activity can help you get to your target blood sugar level goal and manage your weight  Get at least 150 minutes of moderate to vigorous aerobic physical activity each week  Do not miss more than 2 days in a row  Do not sit longer than 30 minutes at a time  Your healthcare provider can help you create an activity plan  The plan can include the best activities for you and can help you build your strength and endurance  · Maintain a healthy weight  Ask your healthcare provider what a healthy weight is for you  Ask him or her to help you create a safe weight loss plan if you are overweight  · Take your diabetes medicine or insulin as directed  You may need diabetes medicine, insulin, or both to help control your blood sugar levels  Your healthcare provider will teach you how and when to take your diabetes medicine or insulin  You will also be taught about side effects oral diabetes medicine can cause  Insulin may be injected, or given through a pump or pen   You and your care team will discuss which method is best for you  ? An insulin pump  is an implanted device that gives your insulin 24 hours a day  An insulin pump prevents the need for multiple insulin injections in a day  ? An insulin pen  is a device prefilled with the right amount of insulin  ? You and your family members will be taught how to draw up and give insulin  if this is the best method for you  Your education team will also teach you how to dispose of needles and syringes  ? You will learn how much insulin you need  and when to give it  You will be taught when not to give insulin  You will also be taught what to do if your blood sugar level drops too low  This may happen if you take insulin and do not eat the right amount of carbohydrates  Other things you can do to manage type 2 diabetes:   · Wear medical alert identification  Wear medical alert jewelry or carry a card that says you have diabetes  Ask your provider where to get these items  · Do not smoke  Nicotine and other chemicals in cigarettes and cigars can cause lung and blood vessel damage  It also makes it more difficult to manage your diabetes  Ask your provider for information if you currently smoke and need help to quit  Do not use e-cigarettes or smokeless tobacco in place of cigarettes or to help you quit  They still contain nicotine  · Check your feet each day for cuts, scratches, calluses, or other wounds  Look for redness and swelling, and feel for warmth  Wear shoes that fit well  Check your shoes for rocks or other objects that can hurt your feet  Do not walk barefoot or wear shoes without socks  Wear cotton socks to help keep your feet dry  · Ask about vaccines you may need  You have a higher risk for serious illness if you get the flu, pneumonia, COVID-19, or hepatitis  Ask your provider if you should get vaccines to prevent these or other diseases, and when to get the vaccines      · Talk to your care team if you become stressed about diabetes care  Sometimes being able to fit diabetes care into your life can cause increased stress  The stress can cause you not to take care of yourself properly  Your care team can help by offering tips about self-care  Your care team may suggest you talk to a mental health provider  The provider can listen and offer help with self-care issues  Follow up with your doctor or diabetes care team as directed: You may need to have blood tests done before your follow-up visit  The test results will show if changes need to be made in your treatment or self-care  Write down your questions so you remember to ask them during your visits  Talk to your provider if you cannot afford your medicine  © Copyright [employer] 2022 Information is for End User's use only and may not be sold, redistributed or otherwise used for commercial purposes  All illustrations and images included in CareNotes® are the copyrighted property of [employer]  or [employer]   The above information is an  only  It is not intended as medical advice for individual conditions or treatments  Talk to your doctor, nurse or pharmacist before following any medical regimen to see if it is safe and effective for you  Type 2 Diabetes Management for Adults   AMBULATORY CARE:   Type 2 diabetes  is a disease that affects how your body uses glucose (sugar)  Either your body cannot make enough insulin, or it cannot use the insulin correctly  It is important to keep diabetes controlled to prevent damage to your heart, blood vessels, and other organs  Have someone call your local emergency number (911 in the [address] [facility]) if:   · You cannot be woken  · You have signs of diabetic ketoacidosis:     ? confusion, fatigue    ? vomiting    ? rapid heartbeat    ? fruity smelling breath    ? extreme thirst    ? dry mouth and skin    · You have any of the following signs of a heart attack:      ?  Squeezing, pressure, or pain in your chest    ? You may  also have any of the following:     § Discomfort or pain in your back, neck, jaw, stomach, or arm    § Shortness of breath    § Nausea or vomiting    § Lightheadedness or a sudden cold sweat    · You have any of the following signs of a stroke:      ? Numbness or drooping on one side of your face     ? Weakness in an arm or leg    ? Confusion or difficulty speaking    ? Dizziness, a severe headache, or vision loss    Call your doctor or diabetes care team if:   · You have a sore or wound that will not heal     · You have a change in the amount you urinate  · Your blood sugar levels are higher than your target goals  · You often have lower blood sugar levels than your target goals  · Your skin is red, dry, warm, or swollen  · You have trouble coping with diabetes, or you feel anxious or depressed  · You have questions or concerns about your condition or care  What you need to know about high blood sugar levels:  High blood sugar levels may not cause any symptoms  You may feel more thirsty or urinate more often than usual  Over time, high blood sugar levels can damage your nerves, blood vessels, tissues, and organs  The following can increase your blood sugar levels:  · Large meals or large amounts of carbohydrates at one time    · Less physical activity    · Stress    · Illness    · A lower dose of medicine or insulin, or a late dose    What you need to know about low blood sugar levels: You can prevent symptoms such as shakiness, dizziness, irritability, or confusion by preventing your blood sugar levels from going too low  · Treat a low blood sugar level right away  ? Drink 4 ounces of juice or have 1 tube of glucose gel  ? Check your blood sugar level again 10 to 15 minutes later  ? When the level goes back to normal, eat a meal or snack to prevent another decrease         · Keep glucose gel, raisins, or hard candy with you at all times to treat a low blood sugar level  · Your blood sugar level can get too low if you take diabetes medicine or insulin and do not eat enough food  · If you use insulin, check your blood sugar level before you exercise  ? If your blood sugar level is below 100 mg/dL, eat 4 crackers or 2 ounces of raisins, or drink 4 ounces of juice  ? Check your level every 30 minutes if you exercise more than 1 hour  ? You may need a snack during or after exercise  What you can do to manage your blood sugar levels:   · Check your blood sugar levels as directed and as needed  Several items are available to use to check your levels  You may need to check by testing a drop of blood in a glucose monitor  You may instead be given a continuous glucose monitoring (CGM) device  The device is worn at all times  The CGM checks your blood sugar level every 5 minutes  It sends results to an electronic device such as a smart phone  A CGM can be used with or without an insulin pump  Talk with your provider to find out which method is best for you  The goal for blood sugar levels before meals  is between 80 and 130 mg/dL and 2 hours after eating  is lower than 180 mg/dL  · Make healthy food choices  Work with a dietitian to develop a meal plan that works for you and your schedule  A dietitian can help you learn how to eat the right amount of carbohydrates during your meals and snacks  Carbohydrates can raise your blood sugar level if you eat too many at one time  Examples of foods that contain carbohydrates are breads, cereals, rice, pasta, and sweets  · Get regular physical activity  Physical activity can help you get to your target blood sugar level goal and manage your weight  Get at least 150 minutes of moderate to vigorous aerobic physical activity each week  Do not miss more than 2 days in a row  Do not sit longer than 30 minutes at a time  Your healthcare provider can help you create an activity plan   The plan can include the best activities for you and can help you build your strength and endurance  · Maintain a healthy weight  Ask your healthcare provider what a healthy weight is for you  Ask him or her to help you create a safe weight loss plan if you are overweight  · Take your diabetes medicine or insulin as directed  You may need diabetes medicine, insulin, or both to help control your blood sugar levels  Your healthcare provider will teach you how and when to take your diabetes medicine or insulin  You will also be taught about side effects oral diabetes medicine can cause  Insulin may be injected, or given through a pump or pen  You and your care team will discuss which method is best for you  ? An insulin pump  is an implanted device that gives your insulin 24 hours a day  An insulin pump prevents the need for multiple insulin injections in a day  ? An insulin pen  is a device prefilled with the right amount of insulin  ? You and your family members will be taught how to draw up and give insulin  if this is the best method for you  Your education team will also teach you how to dispose of needles and syringes  ? You will learn how much insulin you need  and when to give it  You will be taught when not to give insulin  You will also be taught what to do if your blood sugar level drops too low  This may happen if you take insulin and do not eat the right amount of carbohydrates  Other things you can do to manage type 2 diabetes:   · Wear medical alert identification  Wear medical alert jewelry or carry a card that says you have diabetes  Ask your provider where to get these items  · Do not smoke  Nicotine and other chemicals in cigarettes and cigars can cause lung and blood vessel damage  It also makes it more difficult to manage your diabetes  Ask your provider for information if you currently smoke and need help to quit   Do not use e-cigarettes or smokeless tobacco in place of cigarettes or to help you quit  They still contain nicotine  · Check your feet each day for cuts, scratches, calluses, or other wounds  Look for redness and swelling, and feel for warmth  Wear shoes that fit well  Check your shoes for rocks or other objects that can hurt your feet  Do not walk barefoot or wear shoes without socks  Wear cotton socks to help keep your feet dry  · Ask about vaccines you may need  You have a higher risk for serious illness if you get the flu, pneumonia, COVID-19, or hepatitis  Ask your provider if you should get vaccines to prevent these or other diseases, and when to get the vaccines  · Talk to your care team if you become stressed about diabetes care  Sometimes being able to fit diabetes care into your life can cause increased stress  The stress can cause you not to take care of yourself properly  Your care team can help by offering tips about self-care  Your care team may suggest you talk to a mental health provider  The provider can listen and offer help with self-care issues  Follow up with your doctor or diabetes care team as directed: You may need to have blood tests done before your follow-up visit  The test results will show if changes need to be made in your treatment or self-care  Write down your questions so you remember to ask them during your visits  Talk to your provider if you cannot afford your medicine  © Copyright [employer] 2022 Information is for End User's use only and may not be sold, redistributed or otherwise used for commercial purposes  All illustrations and images included in CareNotes® are the copyrighted property of A D A M , Inc  or [facility] Alexandra Reid   The above information is an  only  It is not intended as medical advice for individual conditions or treatments  Talk to your doctor, nurse or pharmacist before following any medical regimen to see if it is safe and effective for you      Type 2 Diabetes Management for Adults   AMBULATORY CARE:   Type 2 diabetes  is a disease that affects how your body uses glucose (sugar)  Either your body cannot make enough insulin, or it cannot use the insulin correctly  It is important to keep diabetes controlled to prevent damage to your heart, blood vessels, and other organs  Have someone call your local emergency number (911 in the [address]) if:   · You cannot be woken  · You have signs of diabetic ketoacidosis:     ? confusion, fatigue    ? vomiting    ? rapid heartbeat    ? fruity smelling breath    ? extreme thirst    ? dry mouth and skin    · You have any of the following signs of a heart attack:      ? Squeezing, pressure, or pain in your chest    ? You may  also have any of the following:     § Discomfort or pain in your back, neck, jaw, stomach, or arm    § Shortness of breath    § Nausea or vomiting    § Lightheadedness or a sudden cold sweat    · You have any of the following signs of a stroke:      ? Numbness or drooping on one side of your face     ? Weakness in an arm or leg    ? Confusion or difficulty speaking    ? Dizziness, a severe headache, or vision loss    Call your doctor or diabetes care team if:   · You have a sore or wound that will not heal     · You have a change in the amount you urinate  · Your blood sugar levels are higher than your target goals  · You often have lower blood sugar levels than your target goals  · Your skin is red, dry, warm, or swollen  · You have trouble coping with diabetes, or you feel anxious or depressed  · You have questions or concerns about your condition or care  What you need to know about high blood sugar levels:  High blood sugar levels may not cause any symptoms  You may feel more thirsty or urinate more often than usual  Over time, high blood sugar levels can damage your nerves, blood vessels, tissues, and organs   The following can increase your blood sugar levels:  · Large meals or large amounts of carbohydrates at one time    · Less physical activity    · Stress    · Illness    · A lower dose of medicine or insulin, or a late dose    What you need to know about low blood sugar levels: You can prevent symptoms such as shakiness, dizziness, irritability, or confusion by preventing your blood sugar levels from going too low  · Treat a low blood sugar level right away  ? Drink 4 ounces of juice or have 1 tube of glucose gel  ? Check your blood sugar level again 10 to 15 minutes later  ? When the level goes back to normal, eat a meal or snack to prevent another decrease  · Keep glucose gel, raisins, or hard candy with you at all times to treat a low blood sugar level  · Your blood sugar level can get too low if you take diabetes medicine or insulin and do not eat enough food  · If you use insulin, check your blood sugar level before you exercise  ? If your blood sugar level is below 100 mg/dL, eat 4 crackers or 2 ounces of raisins, or drink 4 ounces of juice  ? Check your level every 30 minutes if you exercise more than 1 hour  ? You may need a snack during or after exercise  What you can do to manage your blood sugar levels:   · Check your blood sugar levels as directed and as needed  Several items are available to use to check your levels  You may need to check by testing a drop of blood in a glucose monitor  You may instead be given a continuous glucose monitoring (CGM) device  The device is worn at all times  The CGM checks your blood sugar level every 5 minutes  It sends results to an electronic device such as a smart phone  A CGM can be used with or without an insulin pump  Talk with your provider to find out which method is best for you  The goal for blood sugar levels before meals  is between 80 and 130 mg/dL and 2 hours after eating  is lower than 180 mg/dL  · Make healthy food choices    Work with a dietitian to develop a meal plan that works for you and your schedule  A dietitian can help you learn how to eat the right amount of carbohydrates during your meals and snacks  Carbohydrates can raise your blood sugar level if you eat too many at one time  Examples of foods that contain carbohydrates are breads, cereals, rice, pasta, and sweets  · Get regular physical activity  Physical activity can help you get to your target blood sugar level goal and manage your weight  Get at least 150 minutes of moderate to vigorous aerobic physical activity each week  Do not miss more than 2 days in a row  Do not sit longer than 30 minutes at a time  Your healthcare provider can help you create an activity plan  The plan can include the best activities for you and can help you build your strength and endurance  · Maintain a healthy weight  Ask your healthcare provider what a healthy weight is for you  Ask him or her to help you create a safe weight loss plan if you are overweight  · Take your diabetes medicine or insulin as directed  You may need diabetes medicine, insulin, or both to help control your blood sugar levels  Your healthcare provider will teach you how and when to take your diabetes medicine or insulin  You will also be taught about side effects oral diabetes medicine can cause  Insulin may be injected, or given through a pump or pen  You and your care team will discuss which method is best for you  ? An insulin pump  is an implanted device that gives your insulin 24 hours a day  An insulin pump prevents the need for multiple insulin injections in a day  ? An insulin pen  is a device prefilled with the right amount of insulin  ? You and your family members will be taught how to draw up and give insulin  if this is the best method for you  Your education team will also teach you how to dispose of needles and syringes  ? You will learn how much insulin you need  and when to give it   You will be taught when not to give insulin  You will also be taught what to do if your blood sugar level drops too low  This may happen if you take insulin and do not eat the right amount of carbohydrates  Other things you can do to manage type 2 diabetes:   · Wear medical alert identification  Wear medical alert jewelry or carry a card that says you have diabetes  Ask your provider where to get these items  · Do not smoke  Nicotine and other chemicals in cigarettes and cigars can cause lung and blood vessel damage  It also makes it more difficult to manage your diabetes  Ask your provider for information if you currently smoke and need help to quit  Do not use e-cigarettes or smokeless tobacco in place of cigarettes or to help you quit  They still contain nicotine  · Check your feet each day for cuts, scratches, calluses, or other wounds  Look for redness and swelling, and feel for warmth  Wear shoes that fit well  Check your shoes for rocks or other objects that can hurt your feet  Do not walk barefoot or wear shoes without socks  Wear cotton socks to help keep your feet dry  · Ask about vaccines you may need  You have a higher risk for serious illness if you get the flu, pneumonia, COVID-19, or hepatitis  Ask your provider if you should get vaccines to prevent these or other diseases, and when to get the vaccines  · Talk to your care team if you become stressed about diabetes care  Sometimes being able to fit diabetes care into your life can cause increased stress  The stress can cause you not to take care of yourself properly  Your care team can help by offering tips about self-care  Your care team may suggest you talk to a mental health provider  The provider can listen and offer help with self-care issues  Follow up with your doctor or diabetes care team as directed: You may need to have blood tests done before your follow-up visit   The test results will show if changes need to be made in your treatment or self-care  Write down your questions so you remember to ask them during your visits  Talk to your provider if you cannot afford your medicine  © Copyright [employer] 2022 Information is for End User's use only and may not be sold, redistributed or otherwise used for commercial purposes  All illustrations and images included in CareNotes® are the copyrighted property of [employer]  or Jeff Reid   The above information is an  only  It is not intended as medical advice for individual conditions or treatments  Talk to your doctor, nurse or pharmacist before following any medical regimen to see if it is safe and effective for you  Type 2 Diabetes Management for Adults   AMBULATORY CARE:   Type 2 diabetes  is a disease that affects how your body uses glucose (sugar)  Either your body cannot make enough insulin, or it cannot use the insulin correctly  It is important to keep diabetes controlled to prevent damage to your heart, blood vessels, and other organs  Have someone call your local emergency number (911 in the [address] [facility]) if:   · You cannot be woken  · You have signs of diabetic ketoacidosis:     ? confusion, fatigue    ? vomiting    ? rapid heartbeat    ? fruity smelling breath    ? extreme thirst    ? dry mouth and skin    · You have any of the following signs of a heart attack:      ? Squeezing, pressure, or pain in your chest    ? You may  also have any of the following:     § Discomfort or pain in your back, neck, jaw, stomach, or arm    § Shortness of breath    § Nausea or vomiting    § Lightheadedness or a sudden cold sweat    · You have any of the following signs of a stroke:      ? Numbness or drooping on one side of your face     ? Weakness in an arm or leg    ? Confusion or difficulty speaking    ?  Dizziness, a severe headache, or vision loss    Call your doctor or diabetes care team if:   · You have a sore or wound that will not heal     · You have a change in the amount you urinate  · Your blood sugar levels are higher than your target goals  · You often have lower blood sugar levels than your target goals  · Your skin is red, dry, warm, or swollen  · You have trouble coping with diabetes, or you feel anxious or depressed  · You have questions or concerns about your condition or care  What you need to know about high blood sugar levels:  High blood sugar levels may not cause any symptoms  You may feel more thirsty or urinate more often than usual  Over time, high blood sugar levels can damage your nerves, blood vessels, tissues, and organs  The following can increase your blood sugar levels:  · Large meals or large amounts of carbohydrates at one time    · Less physical activity    · Stress    · Illness    · A lower dose of medicine or insulin, or a late dose    What you need to know about low blood sugar levels: You can prevent symptoms such as shakiness, dizziness, irritability, or confusion by preventing your blood sugar levels from going too low  · Treat a low blood sugar level right away  ? Drink 4 ounces of juice or have 1 tube of glucose gel  ? Check your blood sugar level again 10 to 15 minutes later  ? When the level goes back to normal, eat a meal or snack to prevent another decrease  · Keep glucose gel, raisins, or hard candy with you at all times to treat a low blood sugar level  · Your blood sugar level can get too low if you take diabetes medicine or insulin and do not eat enough food  · If you use insulin, check your blood sugar level before you exercise  ? If your blood sugar level is below 100 mg/dL, eat 4 crackers or 2 ounces of raisins, or drink 4 ounces of juice  ? Check your level every 30 minutes if you exercise more than 1 hour  ? You may need a snack during or after exercise  What you can do to manage your blood sugar levels:   · Check your blood sugar levels as directed and as needed    Several items are available to use to check your levels  You may need to check by testing a drop of blood in a glucose monitor  You may instead be given a continuous glucose monitoring (CGM) device  The device is worn at all times  The CGM checks your blood sugar level every 5 minutes  It sends results to an electronic device such as a smart phone  A CGM can be used with or without an insulin pump  Talk with your provider to find out which method is best for you  The goal for blood sugar levels before meals  is between 80 and 130 mg/dL and 2 hours after eating  is lower than 180 mg/dL  · Make healthy food choices  Work with a dietitian to develop a meal plan that works for you and your schedule  A dietitian can help you learn how to eat the right amount of carbohydrates during your meals and snacks  Carbohydrates can raise your blood sugar level if you eat too many at one time  Examples of foods that contain carbohydrates are breads, cereals, rice, pasta, and sweets  · Get regular physical activity  Physical activity can help you get to your target blood sugar level goal and manage your weight  Get at least 150 minutes of moderate to vigorous aerobic physical activity each week  Do not miss more than 2 days in a row  Do not sit longer than 30 minutes at a time  Your healthcare provider can help you create an activity plan  The plan can include the best activities for you and can help you build your strength and endurance  · Maintain a healthy weight  Ask your healthcare provider what a healthy weight is for you  Ask him or her to help you create a safe weight loss plan if you are overweight  · Take your diabetes medicine or insulin as directed  You may need diabetes medicine, insulin, or both to help control your blood sugar levels  Your healthcare provider will teach you how and when to take your diabetes medicine or insulin   You will also be taught about side effects oral diabetes medicine can cause  Insulin may be injected, or given through a pump or pen  You and your care team will discuss which method is best for you  ? An insulin pump  is an implanted device that gives your insulin 24 hours a day  An insulin pump prevents the need for multiple insulin injections in a day  ? An insulin pen  is a device prefilled with the right amount of insulin  ? You and your family members will be taught how to draw up and give insulin  if this is the best method for you  Your education team will also teach you how to dispose of needles and syringes  ? You will learn how much insulin you need  and when to give it  You will be taught when not to give insulin  You will also be taught what to do if your blood sugar level drops too low  This may happen if you take insulin and do not eat the right amount of carbohydrates  Other things you can do to manage type 2 diabetes:   · Wear medical alert identification  Wear medical alert jewelry or carry a card that says you have diabetes  Ask your provider where to get these items  · Do not smoke  Nicotine and other chemicals in cigarettes and cigars can cause lung and blood vessel damage  It also makes it more difficult to manage your diabetes  Ask your provider for information if you currently smoke and need help to quit  Do not use e-cigarettes or smokeless tobacco in place of cigarettes or to help you quit  They still contain nicotine  · Check your feet each day for cuts, scratches, calluses, or other wounds  Look for redness and swelling, and feel for warmth  Wear shoes that fit well  Check your shoes for rocks or other objects that can hurt your feet  Do not walk barefoot or wear shoes without socks  Wear cotton socks to help keep your feet dry  · Ask about vaccines you may need  You have a higher risk for serious illness if you get the flu, pneumonia, COVID-19, or hepatitis   Ask your provider if you should get vaccines to prevent these or other diseases, and when to get the vaccines  · Talk to your care team if you become stressed about diabetes care  Sometimes being able to fit diabetes care into your life can cause increased stress  The stress can cause you not to take care of yourself properly  Your care team can help by offering tips about self-care  Your care team may suggest you talk to a mental health provider  The provider can listen and offer help with self-care issues  Follow up with your doctor or diabetes care team as directed: You may need to have blood tests done before your follow-up visit  The test results will show if changes need to be made in your treatment or self-care  Write down your questions so you remember to ask them during your visits  Talk to your provider if you cannot afford your medicine  © Copyright [employer] 2022 Information is for End User's use only and may not be sold, redistributed or otherwise used for commercial purposes  All illustrations and images included in CareNotes® are the copyrighted property of A [employer] A M , Inc  or [facility] [place] Alexandra Anderson  The above information is an  only  It is not intended as medical advice for individual conditions or treatments  Talk to your doctor, nurse or pharmacist before following any medical regimen to see if it is safe and effective for you

## 2022-09-19 NOTE — PROGRESS NOTES
Assessment and Plan:     Problem List Items Addressed This Visit        Endocrine    Type 2 diabetes mellitus without complication, without long-term current use of insulin ([facility] [address] ) - Primary    Relevant Orders    Hemoglobin A1C    Microalbumin / creatinine urine ratio       Cardiovascular and Mediastinum    Essential hypertension    Paroxysmal atrial fibrillation (HCC)    Relevant Orders    TSH, 3rd generation with Free T4 reflex    Digoxin level    Chronic diastolic CHF (congestive heart failure) (HCC)       Nervous and Auditory    Meningioma (HCC)       Musculoskeletal and Integument    Osteoarthritis of knee       Genitourinary    Stage 3b chronic kidney disease ([facility] [address] )       Other    Hyperlipidemia    Restless legs syndrome    S/P placement of cardiac pacemaker    MGUS (monoclonal gammopathy of unknown significance)      Other Visit Diagnoses     Medicare annual wellness visit, subsequent        Encounter for immunization        Relevant Orders    influenza vaccine, high-dose, PF 0 7 mL (FLUZONE HIGH-DOSE) (Completed)          Depression Screening and Follow-up Plan: Patient was screened for depression during today's encounter  They screened negative with a PHQ-2 score of 0  Falls Plan of Care: balance, strength, and gait training instructions were provided  Urinary Incontinence Plan of Care: counseling topics discussed: limiting fluid intake 3-4 hours before bed  Preventive health issues were discussed with patient, and age appropriate screening tests were ordered as noted in patient's After Visit Summary  Personalized health advice and appropriate referrals for health education or preventive services given if needed, as noted in patient's After Visit Summary       History of Present Illness:     Patient presents for a Medicare Wellness Visit    HPI   Patient Care Team:  Carmen Flores MD as PCP - General  Boaz Tristan, [address], DO  MD Andrade Nicholson, DO Yesika Carnes MD Magan Beaver MD (Pain Medicine)  JENNY Robison (Pain Medicine)     Review of Systems:     Review of Systems     Problem List:     Patient Active Problem List   Diagnosis    Essential hypertension    Allergic rhinitis    Fibromyalgia    Hyperlipidemia    Insomnia    Lumbar spondylosis    Lumbar stenosis    Osteoarthrosis, hand    Osteoarthritis of knee    Osteopenia    Paroxysmal atrial fibrillation (HCC)    Peripheral neuropathy    Restless legs syndrome    TMJ syndrome    Vitamin D deficiency    Osteoarthritis of shoulder    Carpal tunnel syndrome on right    Arthritis of both acromioclavicular joints    Chronic rhinitis    Chronic diastolic CHF (congestive heart failure) ([address]    Malignant neoplasm of thyroid gland ([address]    Current use of long term anticoagulation    S/P placement of cardiac pacemaker    Tremors of nervous system    Hx of diplopia    Hurthle cell adenoma of thyroid    MGUS (monoclonal gammopathy of unknown significance)    Vasomotor rhinitis    Chronic tension-type headache, not intractable    Stage 3b chronic kidney disease ([address] )    Meningioma ([address] )    Continuous opioid dependence ([facility] [address] )    Hypomagnesemia    Pulmonary hypertension ([facility] [address] )    Type 2 diabetes mellitus without complication, without long-term current use of insulin ([facility] [address] )      Past Medical and Surgical History:     Past Medical History:   Diagnosis Date    Actinic keratosis     last assessed - [id number]    Arthritis     Atrial fibrillation with rapid ventricular response ([facility] [address] )     last assessed - [id number]    Basal cell carcinoma     Benign colon polyp     last assessed - [id number]    Disease of thyroid gland     Effusion of knee joint right     Resolved - [id number]    Esophageal reflux     Fibromyalgia     last assessed - [id number]    Fibromyalgia, primary     GERD (gastroesophageal reflux disease)     Hypertension     Palpitations     last assessed - [id number]   [facility] Peroneal tendonitis, right     last assessed - [id number]    Right lumbar radiculopathy 3/17/2016    Thyroid cancer ([address] )     Thyroid nodule     Trochanteric bursitis of left hip 3/9/2018     Past Surgical History:   Procedure Laterality Date    CATARACT EXTRACTION Bilateral     COLONOSCOPY  03/2018    EYE SURGERY      HYSTERECTOMY      JOINT REPLACEMENT Left     knee    MO REVISE MEDIAN N/CARPAL TUNNEL SURG Right 11/14/2019    Procedure: RELEASE CARPAL TUNNEL;  Surgeon: Darío Hammer MD;  [facility];  Service: Orthopedics    MO THYROID LOBECTOMY,UNILAT Left 12/16/2020    Procedure: Left THYROID LOBECTOMY;  Surgeon: Fabian Ivy MD;  [facility];  Service: ENT    RECTAL POLYPECTOMY      REPLACEMENT TOTAL KNEE Left     last assessed - [id number]    TONSILLECTOMY      TOTAL ABDOMINAL HYSTERECTOMY      TUBAL LIGATION      US GUIDED THYROID BIOPSY  10/14/2020      Family History:     Family History   Problem Relation Age of Onset    Heart disease Mother     Diabetes Mother     Heart disease Father     Coronary artery disease Father     Stroke Father         cerebrovascular accident    Heart attack Father         myocardial infarction    Sudden death Father         scd    Other Family         Back disorder    Coronary artery disease Family     Neuropathy Family     Osteoporosis Family     No Known Problems Daughter     No Known Problems Maternal Grandmother     No Known Problems Maternal Grandfather     No Known Problems Paternal Grandmother     No Known Problems Paternal Grandfather     Cancer Maternal Uncle     Breast cancer Maternal Aunt 72    No Known Problems Son     No Known Problems Maternal Aunt     No Known Problems Maternal Aunt     No Known Problems Maternal Aunt     No Known Problems Paternal Aunt     No Known Problems Paternal Aunt     Anuerysm Neg Hx     Clotting disorder Neg Hx     Arrhythmia Neg Hx     Heart failure Neg Hx       Social History: Social History     Socioeconomic History    Marital status:      Spouse name: None    Number of children: None    Years of education: None    Highest education level: None   Occupational History    None   Tobacco Use    Smoking status: Never Smoker    Smokeless tobacco: Never Used   Vaping Use    Vaping Use: Never used   Substance and Sexual Activity    Alcohol use: Not Currently     Comment: occosional - every 3 months    Drug use: Never    Sexual activity: Not Currently   Other Topics Concern    None   Social History Narrative    None     Social Determinants of Health     Financial Resource Strain: Not on file   Food Insecurity: Not on file   Transportation Needs: No Transportation Needs    Lack of Transportation (Medical): No    Lack of Transportation (Non-Medical):  No   Physical Activity: Not on file   Stress: Not on file   Social Connections: Not on file   Intimate Partner Violence: Not on file   Housing Stability: Not on file      Medications and Allergies:     Current Outpatient Medications   Medication Sig Dispense Refill    amiodarone 200 mg tablet Take 1 tablet (200 mg total) by mouth daily 90 tablet 3    apixaban (ELIQUIS) 5 mg Take 1 tablet (5 mg total) by mouth 2 (two) times a day 56 tablet 0    ascorbic acid (VITAMIN C) 500 mg tablet Take 500 mg by mouth daily       Cholecalciferol 50 MCG (2000 UT) CAPS Take 2,000 Units by mouth 2 (two) times a day        digoxin (LANOXIN) 0 125 mg tablet Take 1 tablet ( 125 mcg total) by mouth every other day 90 tablet 3    DULoxetine (CYMBALTA) 30 mg delayed release capsule Take 1 capsule (30 mg total) by mouth daily after breakfast 30 capsule 2    ezetimibe (ZETIA) 10 mg tablet Take 1 tablet (10 mg total) by mouth daily 90 tablet 3    famotidine (PEPCID) 20 mg tablet Take 20 mg by mouth every other day        furosemide (LASIX) 20 mg tablet Take one tab daily when needed for shortness of breath, weight gain 3 pounds in one day or Lower leg swelling  10 tablet 3    gabapentin (NEURONTIN) 300 mg capsule Take 1 capsule (300 mg total) by mouth daily at bedtime 30 capsule 5    glimepiride (AMARYL) 1 mg tablet Take 1 tablet (1 mg total) by mouth daily with breakfast 30 tablet 5    ipratropium (ATROVENT) 0 03 % nasal spray 2 sprays into each nostril 3 (three) times a day Begin once per day, then progress to twice per day  Progress to three times a day as tolerated  90 mL 3    lidocaine (XYLOCAINE) 5 % ointment Apply topically as needed for mild pain 35 44 g 0    losartan (COZAAR) 25 mg tablet Take 25 mg by mouth 2 (two) times a day      losartan (COZAAR) 50 mg tablet Take one  50 mg tablet in addition to a 25 mg tablet twice a day (Patient taking differently: Take 50 mg by mouth 2 (two) times a day With 25 mg tablet twice a day to equal 75 mg 2 times day) 180 tablet 1    metoprolol succinate (TOPROL-XL) 25 mg 24 hr tablet Take 5 tablets (125 mg total) by mouth every 12 (twelve) hours for 15 days 150 tablet 0    omeprazole (PriLOSEC) 20 mg delayed release capsule       pantoprazole (PROTONIX) 20 mg tablet Take 20 mg by mouth daily before breakfast      pantoprazole (PROTONIX) 20 mg tablet Take 1 tablet by mouth daily before breakfast      rOPINIRole (REQUIP) 0 5 mg tablet Take 1 tablet (0 5 mg total) by mouth daily at bedtime 90 tablet 3    traMADol (ULTRAM) 50 mg tablet Take 1 tablet (50 mg total) by mouth 2 (two) times a day as needed for moderate pain 60 tablet 3    TURMERIC PO Take 1 capsule by mouth 2 (two) times a day      zolpidem (AMBIEN) 10 mg tablet Take 1 tablet (10 mg total) by mouth daily at bedtime as needed for sleep 90 tablet 1    Chromium Picolinate (CHROMIUM PICOLATE PO) Take 1 tablet by mouth daily       No current facility-administered medications for this visit       Allergies   Allergen Reactions    Sotalol Other (See Comments)     Prolonged QTc leading to torsades de pointes     Penicillins Other (See Comments) As a child calcium deposit in the arm     Ace Inhibitors GI Intolerance     Did feel well on it    Omeprazole Abdominal Pain, Rash and Vomiting     stomach pain, vomiting, rash      Immunizations:     Immunization History   Administered Date(s) Administered    COVID-19 MODERNA VACC 0 5 ML IM 01/27/2021, 02/24/2021    INFLUENZA 12/23/2015, 11/07/2016, 10/18/2017, 12/04/2018    Influenza Split High Dose Preservative Free IM 10/01/2014, 12/23/2015, 11/07/2016, 10/18/2017    Influenza, high dose seasonal 0 7 mL 12/04/2018, 09/26/2019, 09/08/2020, 11/03/2021, 09/19/2022    Influenza, seasonal, injectable 10/16/2012    Pneumococcal Conjugate 13-Valent 04/27/2015    Pneumococcal Polysaccharide PPV23 03/29/2022      Health Maintenance:         Topic Date Due    Hepatitis C Screening  07/29/2024 (Originally 1945)    Breast Cancer Screening: Mammogram  02/17/2023         Topic Date Due    COVID-19 Vaccine (3 - Booster for [employer] series) 07/24/2021      Medicare Screening Tests and Risk Assessments:     Lindy Gee is here for her Subsequent Wellness visit  Last Medicare Wellness visit information reviewed, patient interviewed and updates made to the record today  Health Risk Assessment:   Patient rates overall health as good  Patient feels that their physical health rating is slightly worse  Patient is satisfied with their life  Eyesight was rated as same  Hearing was rated as same  Patient feels that their emotional and mental health rating is same  Patients states they are never, rarely angry  Patient states they are often unusually tired/fatigued  Pain experienced in the last 7 days has been a lot  Patient's pain rating has been 8/10  Patient states that she has experienced weight loss or gain in last 6 months  Depression Screening:   PHQ-2 Score: 0      Fall Risk Screening:    In the past year, patient has experienced: history of falling in past year    Number of falls: 2 or more  Injured during fall?: Yes    Feels unsteady when standing or walking?: Yes    Worried about falling?: Yes      Urinary Incontinence Screening:   Patient has leaked urine accidently in the last six months  Home Safety:  Patient has trouble with stairs inside or outside of their home  Patient has working smoke alarms and has working carbon monoxide detector  Home safety hazards include: none  Nutrition:   Current diet is Low Cholesterol, Limited junk food and No Added Salt  Medications:   Patient is currently taking over-the-counter supplements  OTC medications include: see medication list  Patient is able to manage medications  Activities of Daily Living (ADLs)/Instrumental Activities of Daily Living (IADLs):   Walk and transfer into and out of bed and chair?: Yes  Dress and groom yourself?: Yes    Bathe or shower yourself?: Yes    Feed yourself?  Yes  Do your laundry/housekeeping?: Yes  Manage your money, pay your bills and track your expenses?: Yes  Make your own meals?: Yes    Do your own shopping?: Yes    Previous Hospitalizations:   Any hospitalizations or ED visits within the last 12 months?: Yes      Advance Care Planning:   Living will: Yes    Advanced directive: Yes      Cognitive Screening:   Provider or family/friend/caregiver concerned regarding cognition?: No    PREVENTIVE SCREENINGS      Cardiovascular Screening:    General: History Lipid Disorder and Screening Current      Diabetes Screening:     General: Screening Not Indicated and History Diabetes      Colorectal Cancer Screening:     General: Screening Current      Breast Cancer Screening:     General: Screening Current      Cervical Cancer Screening:    General: Screening Not Indicated      Osteoporosis Screening:    General: Screening Not Indicated and History Osteoporosis      Abdominal Aortic Aneurysm (AAA) Screening:        General: Screening Not Indicated      Lung Cancer Screening:     General: Screening Not Indicated      Hepatitis C Screening:    General: Screening Current    Screening, Brief Intervention, and Referral to Treatment (SBIRT)    Screening  Typical number of drinks in a day: 0  Typical number of drinks in a week: 0  Interpretation: Low risk drinking behavior  Single Item Drug Screening:  How often have you used an illegal drug (including marijuana) or a prescription medication for non-medical reasons in the past year? never    Single Item Drug Screen Score: 0  Interpretation: Negative screen for possible drug use disorder    Brief Intervention  Alcohol & drug use screenings were reviewed  No concerns regarding substance use disorder identified  Review of Current Opioid Use    Opioid Risk Tool (ORT) Interpretation: Score 0-3: Low risk for opioid misuse    [place]MP or [place]  reviewed  No red flags were identified    Other Counseling Topics:   Regular weightbearing exercise and calcium and vitamin D intake       No exam data present     Physical Exam:     /70   Pulse 60   Temp (!) 96 5 °F (35 8 °C)   Resp 16   Ht 5' 2" (1 575 m)   Wt 72 1 kg (159 lb)   LMP 02/01/1990 (Within Weeks)   SpO2 97%   BMI 29 08 kg/m²     Physical Exam     Dayan Sutton MD

## 2022-09-19 NOTE — PROGRESS NOTES
Name: Rony Suarez      : 1945      MRN: [id number]  Encounter Provider: Thalia Escobar MD  Encounter Date: 2022   Encounter department: [address] [place] [address]     1  Type 2 diabetes mellitus without complication, without long-term current use of insulin (HCC)  -     Hemoglobin A1C; Future; Expected date: 2022  -     Microalbumin / creatinine urine ratio    2  Essential hypertension    3  Stage 3b chronic kidney disease ([facility] [address] )    4  Mixed hyperlipidemia    5  Chronic diastolic CHF (congestive heart failure) ([facility])    6  Paroxysmal atrial fibrillation (HCC)  -     TSH, 3rd generation with Free T4 reflex  -     Digoxin level; Future    7  Meningioma ([facility] )    8  Primary osteoarthritis of right knee    9  MGUS (monoclonal gammopathy of unknown significance)    10  Restless legs syndrome    11  Medicare annual wellness visit, subsequent    12  S/P placement of cardiac pacemaker    13  Encounter for immunization  -     influenza vaccine, high-dose, PF 0 7 mL (FLUZONE HIGH-DOSE)    D/C Glimepiride  Repeat labs 3 months  Follow up with multiple specialists  Flu vaccine today  COV 19 booster recommended  OV 6 months        Subjective      Follow up visit  Medications reviewed  Type 2 DM on Glimepiride 1 mg daily  She has been experiencing fairly regular hypoglycemic events  2022 A1c 7  3  no DPN  Current with eye exam   Hypertension blood pressures have been stable on Losartan 75 mg BID and Metoprolol  mg BID  2022 creatinine 1 19  GFR 47  Electrolytes normal except for chloride 98  Na+ 137  Hgb 12  0  2022  Admission for CHF, hyponatremia with altered mental status  Chest x-ray shows small bilateral effusions    Sodium prior to admission 125  On repeat 128 in the setting of volume overload  Patient was treated with IV diuresis with symptomatic improvement  Discharge sodium 136  Creatinine 1  16  Mental status improved back to baseline   Limited echocardiogram Small left ventricular systolic function with EF 60% improved from 45%  Right ventricle normal   Dilated left atrium  Known CNS meningoma  Repeat CT scan head during admission Unchanged left frontal extra-axial mass characterize as probable meningioma on prior MRI  No acute intracranial abnormality  04/2022 MRI brain 4 2 x 1 7 x 3 0 cm fairly homogeneously enhancing, extra-axial mass within the inferior lateral aspect of the left anterior cranial fossa  Mild adjacent dural thickening and enhancement is seen extending inferiorly into the middle cranial fossa and superiorly into the dura of the anterior frontal vertex  Findings are most suggestive of meningioma  Ne[facility]surgical evaluation at [facility] 05/2022  Second opinion at [facility]  Review of Systems   Constitutional: Negative for appetite change, chills, fatigue, fever and unexpected weight change  HENT: Positive for hearing loss (bilateral hearing aids )  Negative for congestion, ear pain, rhinorrhea, sore throat and trouble swallowing  Eyes: Negative for visual disturbance  Respiratory: Negative for cough, shortness of breath and wheezing  Cardiovascular: Negative for chest pain, palpitations and leg swelling  See HPI  05/2022 echo  Left Ventricle: Left ventricular cavity size is normal  Wall thickness is mildly increased  There is moderate concentric hypertrophy  The left ventricular ejection fraction is 45%  Systolic function is mildly reduced  There is mild global hypokinesis  Right Ventricle: Right ventricular cavity size is mildly dilated  Systolic function is mildly reduced  Left Atrium: The atrium is mildly dilated  Right Atrium: The atrium is mildly dilated  Mitral Valve: There is mild annular calcification  There is mild to moderate regurgitation  Tricuspid Valve: There is moderate regurgitation  The right ventricular systolic pressure is severely elevated   The estimated right ventricular systolic pressure is 76 05 mmHg   Prior  cardio versions  03/2021 admission for atrial fibrillation status post ablation procedure  Pre procedure episode of bradycardia with QT prolongation and torsades successfully defibrillated  Subsequent pacemaker placed  Repeat ablation 08/2022 at [facility]  Gastrointestinal: Negative for abdominal pain, blood in stool, constipation, diarrhea, nausea and vomiting  Colonoscopy 04/2018 Two benign polyps  Mild diverticulosis left-sided colon  Endocrine: Negative for polydipsia and polyuria  12/2020 status post left thyroid lobectomy for left thyroid nodule  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  09/2018 DEXA scan T-score -1 5 left femoral neck  On vitamin D supplement    Lab Results       Component                Value               Date                       [id number]             2 006               06/13/2022                                      Genitourinary: Negative for difficulty urinating  Musculoskeletal: Negative for arthralgias and myalgias  OA shoulders/ she is using prn Tylenol Arthritis and Tramadol   07/2019 x-rays right shoulder mild degenerative arthritis right AC joint  Left shoulder mild degenerative changes left AC joint  08/2019 bilateral shoulder intra-articular steroid injections via fluoroscopy with symptomatic relief  S/p bilateral TKR  Skin: Negative for rash  Neurological: Positive for tremors  Negative for dizziness and headaches  RLS on Requip  04/2021 neurology evaluation for tremor suspected benign essential  tremor  She is currently on a beta-blocker  Family history + essential tremor brother  Hematological: Negative for adenopathy  Does not bruise/bleed easily          Lab Results       Component                Value               Date                       WBC                      7 37                06/16/2022                 HGB 14 3                06/16/2022                 HCT                      43 9                06/16/2022                 MCV                      96                  06/16/2022                 PLT                      306                 06/16/2022                          Lab Results       Component                Value               Date                       [id number]               849                 07/01/2022                          MMA elevated at 489   Psychiatric/Behavioral: Positive for dysphoric mood  Negative for sleep disturbance  The patient is nervous/anxious  Stable on low dose Duloxetine 30 mg/day  Chronic insomnia on prn Zolpidem 10 mg                 Current Outpatient Medications on File Prior to Visit   Medication Sig    amiodarone 200 mg tablet Take 1 tablet (200 mg total) by mouth daily    apixaban (ELIQUIS) 5 mg Take 1 tablet (5 mg total) by mouth 2 (two) times a day    ascorbic acid (VITAMIN C) 500 mg tablet Take 500 mg by mouth daily     Cholecalciferol 50 MCG (2000 UT) CAPS Take 2,000 Units by mouth 2 (two) times a day      digoxin (LANOXIN) 0 125 mg tablet Take 1 tablet ( 125 mcg total) by mouth every other day    DULoxetine (CYMBALTA) 30 mg delayed release capsule Take 1 capsule (30 mg total) by mouth daily after breakfast    ezetimibe (ZETIA) 10 mg tablet Take 1 tablet (10 mg total) by mouth daily    famotidine (PEPCID) 20 mg tablet Take 20 mg by mouth every other day      furosemide (LASIX) 20 mg tablet Take one tab daily when needed for shortness of breath, weight gain 3 pounds in one day or Lower leg swelling   gabapentin (NEURONTIN) 300 mg capsule Take 1 capsule (300 mg total) by mouth daily at bedtime    glimepiride (AMARYL) 1 mg tablet Take 1 tablet (1 mg total) by mouth daily with breakfast    ipratropium (ATROVENT) 0 03 % nasal spray 2 sprays into each nostril 3 (three) times a day Begin once per day, then progress to twice per day   Progress to three times a day as tolerated   lidocaine (XYLOCAINE) 5 % ointment Apply topically as needed for mild pain    losartan (COZAAR) 25 mg tablet Take 25 mg by mouth 2 (two) times a day    losartan (COZAAR) 50 mg tablet Take one  50 mg tablet in addition to a 25 mg tablet twice a day (Patient taking differently: Take 50 mg by mouth 2 (two) times a day With 25 mg tablet twice a day to equal 75 mg 2 times day)    metoprolol succinate (TOPROL-XL) 25 mg 24 hr tablet Take 5 tablets (125 mg total) by mouth every 12 (twelve) hours for 15 days    omeprazole (PriLOSEC) 20 mg delayed release capsule     pantoprazole (PROTONIX) 20 mg tablet Take 20 mg by mouth daily before breakfast    pantoprazole (PROTONIX) 20 mg tablet Take 1 tablet by mouth daily before breakfast    rOPINIRole (REQUIP) 0 5 mg tablet Take 1 tablet (0 5 mg total) by mouth daily at bedtime    traMADol (ULTRAM) 50 mg tablet Take 1 tablet (50 mg total) by mouth 2 (two) times a day as needed for moderate pain    TURMERIC PO Take 1 capsule by mouth 2 (two) times a day    zolpidem (AMBIEN) 10 mg tablet Take 1 tablet (10 mg total) by mouth daily at bedtime as needed for sleep    Chromium Picolinate (CHROMIUM PICOLATE PO) Take 1 tablet by mouth daily    [DISCONTINUED] aspirin 81 mg chewable tablet Chew 81 mg daily       Objective     /70   Pulse 60   Temp (!) 96 5 °F (35 8 °C)   Resp 16   Ht 5' 2" (1 575 m)   Wt 72 1 kg (159 lb)   LMP 02/01/1990 (Within Weeks)   SpO2 97%   BMI 29 08 kg/m²     BP Readings from Last 3 Encounters:   09/19/22 122/70   08/09/22 (!) 172/84   08/09/22 159/75     Wt Readings from Last 3 Encounters:   09/19/22 72 1 kg (159 lb)   08/09/22 72 6 kg (160 lb)   08/09/22 71 7 kg (158 lb)         Physical Exam  Vitals and nursing note reviewed  Constitutional:       General: She is not in acute distress  Appearance: She is well-developed     HENT:      Right Ear: Tympanic membrane and ear canal normal       Left Ear: Tympanic membrane and ear canal normal    Eyes:      General: No scleral icterus  Extraocular Movements: Extraocular movements intact  Conjunctiva/sclera: Conjunctivae normal       Pupils: Pupils are equal, round, and reactive to light  Neck:      Thyroid: No thyroid mass or thyromegaly  Vascular: No carotid bruit or JVD  Trachea: No tracheal deviation  Cardiovascular:      Rate and Rhythm: Normal rate and regular rhythm  Pulses: no weak pulses          Dorsalis pedis pulses are 2+ on the right side and 2+ on the left side  Posterior tibial pulses are 2+ on the right side and 2+ on the left side  Heart sounds: Normal heart sounds  No murmur heard  No gallop  Pulmonary:      Effort: Pulmonary effort is normal  No respiratory distress  Breath sounds: Normal breath sounds  No wheezing or rales  Chest:   Breasts:      Right: No supraclavicular adenopathy  Left: No supraclavicular adenopathy  Musculoskeletal:      Right lower leg: No edema  Left lower leg: No edema  Feet:      Right foot:      Skin integrity: No ulcer, skin breakdown, erythema, warmth, callus or dry skin  Left foot:      Skin integrity: No ulcer, skin breakdown, erythema, warmth, callus or dry skin  Lymphadenopathy:      Cervical: No cervical adenopathy  Upper Body:      Right upper body: No supraclavicular adenopathy  Left upper body: No supraclavicular adenopathy  Skin:     Findings: No rash  Nails: There is no clubbing  Neurological:      General: No focal deficit present  Mental Status: She is alert and oriented to person, place, and time  Psychiatric:         Mood and Affect: Mood normal          Behavior: Behavior normal          Cognition and Memory: Cognition normal        Patient's shoes and socks removed  Right Foot/Ankle   Right Foot Inspection  Skin Exam: skin normal and skin intact   No dry skin, no warmth, no callus, no erythema, no maceration, no abnormal color, no pre-ulcer, no ulcer and no callus  Toe Exam: ROM and strength within normal limits  Sensory   Monofilament testing: intact    Vascular  Capillary refills: < 3 seconds  The right DP pulse is 2+  The right PT pulse is 2+  Left Foot/Ankle  Left Foot Inspection  Skin Exam: skin normal and skin intact  No dry skin, no warmth, no erythema, no maceration, normal color, no pre-ulcer, no ulcer and no callus  Toe Exam: ROM and strength within normal limits  Sensory   Monofilament testing: intact    Vascular  Capillary refills: < 3 seconds  The left DP pulse is 2+  The left PT pulse is 2+       Assign Risk Category  No deformity present  No loss of protective sensation  No weak pulses  Risk: 0    Marichuy Garsia MD

## 2022-09-20 ENCOUNTER — TELEPHONE (OUTPATIENT)
Dept: CARDIOLOGY CLINIC | Facility: CLINIC | Age: 77
End: 2022-09-20

## 2022-09-20 NOTE — TELEPHONE ENCOUNTER
[facility] is scheduled to see you in two weeks on 10/6  Last seen in April  She called today, said she thinks she may need to be seen sooner, as she experiences some chest pressure at times  It is pressure that moves across her chest      She is not dizzy or lightheaded, not sob  BP 120s, 130s  I advised [facility] to send in a remote transmission from her pacemaker  She is s/p ablation at [facility] in August   She does not feel like she is in A Fib  Weight has been stable  Eliel Esteves has an opening on 9/29, which [facility] considered, but said she would like to see you  The appt with Eliel Esteves is one week earlier than visit with you  [facility] saw her PCP yesterday and said she did mention this pressure in her chest      I said I would contact you regarding any recommendations  Will schedule with Eliel Esteves and cancel 10/6 with you if advised  Please advise

## 2022-09-20 NOTE — TELEPHONE ENCOUNTER
Per discussion with Dr Jose Lawson, patient should schedule with [facility] or go to ER  I spoke with Beverly Sawant  She agreed to ov with [facility] on 9/28  Will go to ER if symptoms worsen

## 2022-09-20 NOTE — TELEPHONE ENCOUNTER
Device clinic received remote transmission from her device- no arrhythmia noted  HR 65  Final report to follow  LIZABETH

## 2022-09-23 ENCOUNTER — TELEPHONE (OUTPATIENT)
Dept: ADMINISTRATIVE | Facility: OTHER | Age: 77
End: 2022-09-23

## 2022-09-23 NOTE — LETTER
Diabetic Eye Exam Form    Date Requested: 22  Patient: Yulisa Schwab  Patient : 1945   Referring Provider: Carmen Flores MD    DIABETIC Eye Exam Date _______________________________    Type of Exam MUST be documented for Diabetic Eye Exams  Please CHECK ONE  Retinal Exam       Dilated Retinal Exam       OCT       Optomap-Iris Exam      Fundus Photography     Left Eye - Please check Retinopathy AND Type or No Retinopathy      Exam did show retinopathy    Exam did not show retinopathy         Mild     Proliferative           Moderate    Severe            None         Right Eye - Please check Retinopathy AND Type or No Retinopathy     Exam did show retinopathy    Exam did not show retinopathy         Mild     Proliferative        Moderate    Severe        None       Comments __________________________________________________________    Practice Providing Exam ______________________________________________    Exam Performed By (print name) _______________________________________      Provider Signature ___________________________________________________    These reports are needed for  compliance  Please fax this completed form and a copy of the Diabetic Eye Exam report to our office located at [place] [address] as soon as possible via [phone] torrey Barraza: Phone [phone]  We thank you for your assistance in treating our mutual patient

## 2022-09-23 NOTE — TELEPHONE ENCOUNTER
----- Message from Dary Florentino sent at 9/22/2022 12:07 PM EDT -----  Regarding: Diabetic Eye exam  09/22/22 12:09 PM    Hello, our patient Rony Suarez has had Diabetic eye exam completed/performed  Please assist in updating the patient chart by [facility]  The date of service is 2022      Thank you,  Naga Gottlieb MA   [address] [facility]

## 2022-09-23 NOTE — TELEPHONE ENCOUNTER
Upon review of the In Basket request and the patient's chart, initial outreach has been made via fax, please see Contacts section for details        [phone]         Thank you  Shilo Aquino MA

## 2022-09-26 DIAGNOSIS — I10 ESSENTIAL HYPERTENSION: ICD-10-CM

## 2022-09-26 RX ORDER — LOSARTAN POTASSIUM 25 MG/1
25 TABLET ORAL 2 TIMES DAILY
Qty: 180 TABLET | Refills: 1 | Status: SHIPPED | OUTPATIENT
Start: 2022-09-26 | End: 2022-10-06

## 2022-09-27 NOTE — PROGRESS NOTES
Cardiology Follow Up    Mango Castellano  1945  [id number]  [address] [facility] [id number] [phone]    1  Chest pain, unspecified type  POCT ECG    NM myocardial perfusion spect (stress and/or rest)    amLODIPine (NORVASC) 5 mg tablet    aspirin 81 mg chewable tablet   2  Hypertension, unspecified type  amLODIPine (NORVASC) 5 mg tablet   3  Chronic diastolic CHF (congestive heart failure) (HCC)     4  Paroxysmal atrial fibrillation ([address] )     5  Pacemaker     6  Mixed hyperlipidemia         Interval History:   On 9/20/22 Ms Mesfin Dill called our office with complaints of CP across her chest   -130  She was advised on a remote transmission of her device  She was scheduled for an office visit and advised to present to the ED if CP worsened        9/20/22 Device interrogation : AP 73%,  0%,  No significant high rates normal device function all lead parameters within normal limits    Ms Preston Monroy presents to our office for an acute office visit with complaints of CP  CP started 10 days ago  Lana Pinedo states chest pain occurring with activity such as sweeping her deck, 3/10, lasting 1-2 minutes, occurring 2-3 times a day without radiation, shortness of breath, nausea or diaphoresis        HPI:  Paroxysmal atrial fibrillation [id number], on Eliquis 5mg BID for stroke prevention,  3/12/21 sp cryoablation with pulmonary vein isolation, DCCV from AF to NSR    Placed on Flecainide post procedure  8/02/22 sp atrial fibrillation ablation   Pulmonary vein isolation, rare isolation of right vein, and a straight modification with posterior wall modification  Successful CTI flutter ablation with by directional block  Elevated LA pressure and moderate 60-70%    by Dr Claribel Tom   On 1/06/22 Ms Mesfin Dill underwent DCCV single biphasic shock 200J to NSR   ,  SSS,  3/15/21 Sp MDT dual chamber PPM insertion ,   Hypertension  Chronic HFpEF LVEF 55% by TTE 1/04/21 dry weight ? 159 pounds  Mild to mod TR per TTE 1/04/21   Hyperlipidemia 4/01/21 , , HDL 66, LDL 88   12/2020 Thyroid nodule Hurthle cell adenoma with degenerative changes   sp Lobectomy  10/27/21 TSH 0 975  2016 Sleep study negative for sleep apnea   Colonoscopy 8/2021 11/09/21 PFT"s : FEV1/FVC ratio 81%, FVC 2 67 L 103% predicted, FEV1 2 18 L 105% predicted,   Mildly reduced diffusion capacity  Admitted to [address] [place] [address] on 6/13 - 6/17/22 with acute heart failure  discharge weight 160 pounds    Allergies: Sotalol- prolonged QT leading to  torsades de pointes      4/26/22 MRI of the brain showed  Extra-axial mass within the inferior lateral aspect of the left anterior cranial fossa  Mild adjacent dural thickening and enhancement seen extending inferiorly into the middle cranial fossa and superiorly into the dura of the anterior frontal vertex  Findings suggestive of meningioma     6/15/22 TTE:  Left ventricle cavity size normal wall thickness increased LVEF 10% systolic function normal wall motion normal   Right ventricular cavity size normal systolic function normal   Left atrium dilated     Patient Active Problem List   Diagnosis    Essential hypertension    Allergic rhinitis    Fibromyalgia    Hyperlipidemia    Insomnia    Lumbar spondylosis    Lumbar stenosis    Osteoarthrosis, hand    Osteoarthritis of knee    Osteopenia    Paroxysmal atrial fibrillation (HCC)    Peripheral neuropathy    Restless legs syndrome    TMJ syndrome    Vitamin D deficiency    Osteoarthritis of shoulder    Carpal tunnel syndrome on right    Arthritis of both acromioclavicular joints    Chronic rhinitis    Chronic diastolic CHF (congestive heart failure) ([address] )    Malignant neoplasm of thyroid gland ([address] )    Current use of long term anticoagulation    S/P placement of cardiac pacemaker    Tremors of nervous system  Hx of diplopia    Hurthle cell adenoma of thyroid    MGUS (monoclonal gammopathy of unknown significance)    Vasomotor rhinitis    Chronic tension-type headache, not intractable    Stage 3b chronic kidney disease ([facility] )    Meningioma (HCC)    Continuous opioid dependence ([facility] )    Hypomagnesemia    Pulmonary hypertension (HCC)    Type 2 diabetes mellitus without complication, without long-term current use of insulin ([facility] )     Past Medical History:   Diagnosis Date    Actinic keratosis     last assessed - [id number]    Arthritis     Atrial fibrillation with rapid ventricular response (HCC)     last assessed - [id number]    Basal cell carcinoma     Benign colon polyp     last assessed - [id number]    Disease of thyroid gland     Effusion of knee joint right     Resolved - [id number]    Esophageal reflux     Fibromyalgia     last assessed - [id number]    Fibromyalgia, primary     GERD (gastroesophageal reflux disease)     Hypertension     Palpitations     last assessed - [id number]    Peroneal tendonitis, right     last assessed - [id number]    Right lumbar radiculopathy 3/17/2016    Thyroid cancer (HCC)     Thyroid nodule     Trochanteric bursitis of left hip 3/9/2018     Social History     Socioeconomic History    Marital status:       Spouse name: Not on file    Number of children: Not on file    Years of education: Not on file    Highest education level: Not on file   Occupational History    Not on file   Tobacco Use    Smoking status: Never Smoker    Smokeless tobacco: Never Used   Vaping Use    Vaping Use: Never used   Substance and Sexual Activity    Alcohol use: Not Currently     Comment: occosional - every 3 months    Drug use: Never    Sexual activity: Not Currently   Other Topics Concern    Not on file   Social History Narrative    Not on file     Social Determinants of Health     Financial Resource Strain: Low Risk     Difficulty of Paying Living Expenses: Not hard at all   Food Insecurity: Not on file   Transportation Needs: No Transportation Needs    Lack of Transportation (Medical): No    Lack of Transportation (Non-Medical):  No   Physical Activity: Not on file   Stress: Not on file   Social Connections: Not on file   Intimate Partner Violence: Not on file   Housing Stability: Not on file      Family History   Problem Relation Age of Onset    Heart disease Mother     Diabetes Mother     Heart disease Father     Coronary artery disease Father     Stroke Father         cerebrovascular accident    Heart attack Father         myocardial infarction    Sudden death Father         scd    Other Family         Back disorder    Coronary artery disease Family     Neuropathy Family     Osteoporosis Family     No Known Problems Daughter     No Known Problems Maternal Grandmother     No Known Problems Maternal Grandfather     No Known Problems Paternal Grandmother     No Known Problems Paternal Grandfather     Cancer Maternal Uncle     Breast cancer Maternal Aunt 72    No Known Problems Son     No Known Problems Maternal Aunt     No Known Problems Maternal Aunt     No Known Problems Maternal Aunt     No Known Problems Paternal Aunt     No Known Problems Paternal Aunt     Anuerysm Neg Hx     Clotting disorder Neg Hx     Arrhythmia Neg Hx     Heart failure Neg Hx      Past Surgical History:   Procedure Laterality Date    CATARACT EXTRACTION Bilateral     COLONOSCOPY  03/2018    EYE SURGERY      HYSTERECTOMY      JOINT REPLACEMENT Left     knee    DE REVISE MEDIAN N/CARPAL TUNNEL SURG Right 11/14/2019    Procedure: RELEASE CARPAL TUNNEL;  Surgeon: Nini Giordano MD;  [facility];  Service: Orthopedics    DE THYROID LOBECTOMY,UNILAT Left 12/16/2020    Procedure: Left THYROID LOBECTOMY;  Surgeon: Mary Carmen Fajardo MD;  Location[facility];  Service: ENT    RECTAL POLYPECTOMY      REPLACEMENT TOTAL KNEE Left     last assessed - [id number]    TONSILLECTOMY      TOTAL ABDOMINAL HYSTERECTOMY      TUBAL LIGATION      US GUIDED THYROID BIOPSY  10/14/2020       Current Outpatient Medications:     amiodarone 200 mg tablet, Take 1 tablet (200 mg total) by mouth daily, Disp: 90 tablet, Rfl: 3    apixaban (ELIQUIS) 5 mg, Take 1 tablet (5 mg total) by mouth 2 (two) times a day, Disp: 56 tablet, Rfl: 0    ascorbic acid (VITAMIN C) 500 mg tablet, Take 500 mg by mouth daily , Disp: , Rfl:     Cholecalciferol 50 MCG (2000 UT) CAPS, Take 2,000 Units by mouth 2 (two) times a day  , Disp: , Rfl:     Chromium Picolinate (CHROMIUM PICOLATE PO), Take 1 tablet by mouth daily, Disp: , Rfl:     digoxin (LANOXIN) 0 125 mg tablet, Take 1 tablet ( 125 mcg total) by mouth every other day, Disp: 90 tablet, Rfl: 3    DULoxetine (CYMBALTA) 30 mg delayed release capsule, Take 1 capsule (30 mg total) by mouth daily after breakfast, Disp: 30 capsule, Rfl: 2    ezetimibe (ZETIA) 10 mg tablet, Take 1 tablet (10 mg total) by mouth daily, Disp: 90 tablet, Rfl: 3    famotidine (PEPCID) 20 mg tablet, Take 20 mg by mouth every other day  , Disp: , Rfl:     furosemide (LASIX) 20 mg tablet, Take one tab daily when needed for shortness of breath, weight gain 3 pounds in one day or Lower leg swelling , Disp: 10 tablet, Rfl: 3    gabapentin (NEURONTIN) 300 mg capsule, Take 1 capsule (300 mg total) by mouth daily at bedtime, Disp: 30 capsule, Rfl: 5    glimepiride (AMARYL) 1 mg tablet, Take 1 tablet (1 mg total) by mouth daily with breakfast, Disp: 30 tablet, Rfl: 5    ipratropium (ATROVENT) 0 03 % nasal spray, 2 sprays into each nostril 3 (three) times a day Begin once per day, then progress to twice per day  Progress to three times a day as tolerated  , Disp: 90 mL, Rfl: 3    lidocaine (XYLOCAINE) 5 % ointment, Apply topically as needed for mild pain, Disp: 35 44 g, Rfl: 0    losartan (COZAAR) 25 mg tablet, Take 1 tablet (25 mg total) by mouth 2 (two) times a day, Disp: 180 tablet, Rfl: 1    losartan (COZAAR) 50 mg tablet, Take one  50 mg tablet in addition to a 25 mg tablet twice a day (Patient taking differently: Take 50 mg by mouth 2 (two) times a day With 25 mg tablet twice a day to equal 75 mg 2 times day), Disp: 180 tablet, Rfl: 1    metoprolol succinate (TOPROL-XL) 25 mg 24 hr tablet, Take 5 tablets (125 mg total) by mouth every 12 (twelve) hours for 15 days, Disp: 150 tablet, Rfl: 0    omeprazole (PriLOSEC) 20 mg delayed release capsule, , Disp: , Rfl:     pantoprazole (PROTONIX) 20 mg tablet, Take 20 mg by mouth daily before breakfast, Disp: , Rfl:     pantoprazole (PROTONIX) 20 mg tablet, Take 1 tablet by mouth daily before breakfast, Disp: , Rfl:     rOPINIRole (REQUIP) 0 5 mg tablet, Take 1 tablet (0 5 mg total) by mouth daily at bedtime, Disp: 90 tablet, Rfl: 3    traMADol (ULTRAM) 50 mg tablet, Take 1 tablet (50 mg total) by mouth 2 (two) times a day as needed for moderate pain, Disp: 60 tablet, Rfl: 3    TURMERIC PO, Take 1 capsule by mouth 2 (two) times a day, Disp: , Rfl:     zolpidem (AMBIEN) 10 mg tablet, Take 1 tablet (10 mg total) by mouth daily at bedtime as needed for sleep, Disp: 90 tablet, Rfl: 1  Allergies   Allergen Reactions    Sotalol Other (See Comments)     Prolonged QTc leading to torsades de pointes     Penicillins Other (See Comments)     As a child calcium deposit in the arm     Ace Inhibitors GI Intolerance     Did feel well on it    Omeprazole Abdominal Pain, Rash and Vomiting     stomach pain, vomiting, rash       Labs:  No visits with results within 2 Month(s) from this visit     Latest known visit with results is:   Appointment on 07/11/2022   Component Date Value    WBC 07/11/2022 7 48     RBC 07/11/2022 4 53     Hemoglobin 07/11/2022 14 1     Hematocrit 07/11/2022 43 7     MCV 07/11/2022 97     MCH 07/11/2022 31 1     MCHC 07/11/2022 32 3     RDW 07/11/2022 13 3     MPV 07/11/2022 10 8     Platelets 91/10/4740 283     nRBC 07/11/2022 0     Neutrophils Relative 07/11/2022 71     Immat GRANS % 07/11/2022 0     Lymphocytes Relative 07/11/2022 16     Monocytes Relative 07/11/2022 9     Eosinophils Relative 07/11/2022 3     Basophils Relative 07/11/2022 1     Neutrophils Absolute 07/11/2022 5 30     Immature Grans Absolute 07/11/2022 0 03     Lymphocytes Absolute 07/11/2022 1 20     Monocytes Absolute 07/11/2022 0 65     Eosinophils Absolute 07/11/2022 0 25     Basophils Absolute 07/11/2022 0 05     Sodium 07/11/2022 137     Potassium 07/11/2022 4 2     Chloride 07/11/2022 102     CO2 07/11/2022 29     ANION GAP 07/11/2022 6     BUN 07/11/2022 20     Creatinine 07/11/2022 1 13     Glucose, Fasting 07/11/2022 160 (A)    Calcium 07/11/2022 9 9     Corrected Calcium 07/11/2022 10 5 (A)    AST 07/11/2022 29     ALT 07/11/2022 40     Alkaline Phosphatase 07/11/2022 104     Total Protein 07/11/2022 7 7     Albumin 07/11/2022 3 3 (A)    Total Bilirubin 07/11/2022 1 04 (A)    eGFR 07/11/2022 46     IGA 07/11/2022 518 0 (A)    IGG 07/11/2022 960 0     IGM 07/11/2022 68 0     Ig [place] Free Light Chain 07/11/2022 36 0 (A)    Ig Lambda Free Light [place]* 07/11/2022 27 7 (A)    Kappa/Lambda FluidC Ratio 07/11/2022 1 30     A/G Ratio 07/11/2022 1 08 (A)    Albumin Electrophoresis 07/11/2022 51 9 (A)    Albumin CONC 07/11/2022 3 79     Alpha 1 07/11/2022 4 9     ALPHA 1 CONC 07/11/2022 0 36     Alpha 2 07/11/2022 12 6     ALPHA 2 CONC 07/11/2022 0 92     Beta-1 07/11/2022 7 7     BETA 1 CONC 07/11/2022 0 56     Beta-2 07/11/2022 7 7     BETA 2 CONC 07/11/2022 0 56 (A)    Gamma Globulin 07/11/2022 15 2     GAMMA CONC 07/11/2022 1 11     M Peak ID 1 07/11/2022 2 40     M PEAK 1 CONC 07/11/2022 0 18     Total Protein 07/11/2022 7 3     SPEP Interpretation 07/11/2022 See Comment     Uric Acid 07/11/2022 5 3     Beta-2 Microglobulin 07/11/2022 3 1 (A)    TSH 3RD GENERATON 07/11/2022 0 744     Digoxin Lvl 07/11/2022 1 0     Immunofixation Interpret* 07/11/2022 See Comment      Imaging: Cardiac EP device report    Result Date: 9/20/2022  Narrative: MDT-DUAL CHAMBER PPM (AAIR-DDDR MODE)/ ACTIVE SYSTEM IS MRI CONDITIONAL NB/TELEPHONE NOTE-CARELINK TRANSMISSION: NO SIGNIFICANT HIGH RATE EPISODES  BATTERY STATUS "12 YRS " AP 73%  0%  ALL AVAILABLE LEAD PARAMETERS WITHIN NORMAL LIMITS  NORMAL DEVICE FUNCTION  NC       Review of Systems:  Review of Systems   Cardiovascular: Positive for chest pain  Musculoskeletal: Positive for arthralgias and myalgias  Juancarlos Knee pain    All other systems reviewed and are negative  Physical Exam:  Physical Exam  Vitals reviewed  Constitutional:       Appearance: She is well-developed  HENT:      Head: Normocephalic  Cardiovascular:      Rate and Rhythm: Normal rate and regular rhythm  Heart sounds: Normal heart sounds  Pulmonary:      Effort: Pulmonary effort is normal       Breath sounds: Normal breath sounds  Abdominal:      General: Bowel sounds are normal       Palpations: Abdomen is soft  Musculoskeletal:         General: Normal range of motion  Cervical back: Normal range of motion  Right lower leg: No edema  Left lower leg: No edema  Skin:     General: Skin is warm  Capillary Refill: Capillary refill takes less than 2 seconds  Neurological:      General: No focal deficit present  Mental Status: She is alert and oriented to person, place, and time  Discussion/Summary:  1  Chest pain - CP started 10 days ago    Vassjohnathan Ovalles states chest pain in mid left chest, occurring with activity such as sweeping her deck, 3/10, lasting 1-2 minutes, occurring 2-3 times a day without radiation, shortness of breath, nausea or diaphoresis, ASCVD risk 51 7% , CP not re produced with palpation,  Start aspirin 81 mg daily, blood pressure sub optimal control starting Amlodipine 5 mg daily continue metoprolol succinate 75 mg q 12 hours, losartan 75 mg b i d , Zetia 10 mg daily  EKG AP 67 BPM, flattened T waves in V4-V6  Long discussion in regards to cardiac stress testing if abnormal would suggest left heart catheterization Tito Ovalles is in agreement  Nuclear Rx walking stress test R/O ischemia  2  Hypertension RUE sitting 148/78, home BP higher in afternoon 150-160,  I will add amlodipine 5 mg daily to medical regimen to be taken in the afternoon  Continue on metoprolol succinate 75 mg q 12 hours, losartan 75 mg b i d , 2gm sodium diet   3  Chronic HFpEF LVEF 60%, NYHA class III stage C- On PE eu volemic  HR controlled, BP elevated 148/78 goal   Start amlodipine 5 mg daily continue on losartan 75 mg b i d , metoprolol succinate 75 mg q 12 hours, Lasix 20 mg when needed  Tito Ovalles is abiding by 2 g sodium diet 1500 cc fluid restriction her weights are stable at home 154 lb  4  Paroxysmal atrial fibrillation [id number]= 4, On 1/06/22 sp DCCV single biphasic shock 200J to NSR  on Eliquis 5mg BID for stroke prevention,  3/12/21 sp cryoablation with pulmonary vein isolation, DCCV from AF to NSR    Placed on Flecainide post procedure  8/02/22 sp atrial fibrillation ablation   Pulmonary vein isolation, rare isolation of right vein, and a straight modification with posterior wall modification  Successful CTI flutter ablation with by directional block  Elevated LA pressure and moderate 60-70%  by Dr Kia Jones  Off digoxin  after this procedure Continue metoprolol succinate 125 mg q 12 hours, amiodarone 200 mg daily  5  SSS,  3/15/21 Sp MDT dual chamber PPM insertion , 9/20/22 Device interrogation : AP 73%,  0%,  No significant high rates normal device function all lead parameters within normal limits  6  Hyperlipidemia 4/01/21 , , HDL 66, LDL 88 continue on Zetia 10 mg daily, heart healthy diet

## 2022-09-28 ENCOUNTER — OFFICE VISIT (OUTPATIENT)
Dept: CARDIOLOGY CLINIC | Facility: CLINIC | Age: 77
End: 2022-09-28
Payer: MEDICARE

## 2022-09-28 VITALS
HEART RATE: 67 BPM | SYSTOLIC BLOOD PRESSURE: 134 MMHG | BODY MASS INDEX: 29.09 KG/M2 | DIASTOLIC BLOOD PRESSURE: 80 MMHG | HEIGHT: 62 IN | WEIGHT: 158.1 LBS | OXYGEN SATURATION: 100 %

## 2022-09-28 DIAGNOSIS — Z95.0 PACEMAKER: ICD-10-CM

## 2022-09-28 DIAGNOSIS — I50.32 CHRONIC DIASTOLIC CHF (CONGESTIVE HEART FAILURE) (HCC): ICD-10-CM

## 2022-09-28 DIAGNOSIS — E78.2 MIXED HYPERLIPIDEMIA: ICD-10-CM

## 2022-09-28 DIAGNOSIS — I48.0 PAROXYSMAL ATRIAL FIBRILLATION (HCC): ICD-10-CM

## 2022-09-28 DIAGNOSIS — I10 HYPERTENSION, UNSPECIFIED TYPE: ICD-10-CM

## 2022-09-28 DIAGNOSIS — R07.9 CHEST PAIN, UNSPECIFIED TYPE: Primary | ICD-10-CM

## 2022-09-28 PROCEDURE — 99215 OFFICE O/P EST HI 40 MIN: CPT | Performed by: NURSE PRACTITIONER

## 2022-09-28 PROCEDURE — 93000 ELECTROCARDIOGRAM COMPLETE: CPT | Performed by: NURSE PRACTITIONER

## 2022-09-28 RX ORDER — ASPIRIN 81 MG/1
81 TABLET, CHEWABLE ORAL DAILY
Qty: 30 TABLET | Refills: 3 | Status: SHIPPED | OUTPATIENT
Start: 2022-09-28

## 2022-09-28 RX ORDER — AMLODIPINE BESYLATE 5 MG/1
5 TABLET ORAL DAILY
Qty: 30 TABLET | Refills: 3 | Status: SHIPPED | OUTPATIENT
Start: 2022-09-28

## 2022-09-28 NOTE — PATIENT INSTRUCTIONS
Maintain a 2 gram daily sodium diet and 1500 ml daily fluid restriction  Check daily weights  If you gained 3 pounds in one day, 5 pounds in one week, or experience worsening shortness of breath or increasing lower leg swelling  Please call the heart failure office at [phone]    Please bring a  list of your current medications and daily weights to the office visit     Norvasc 5mg daily  ASA 81mg daily with breakfast

## 2022-09-29 NOTE — TELEPHONE ENCOUNTER
Upon review of the In Basket request we received document request with a note "No record of this patient"    Any additional questions or concerns should be emailed to the Practice Liaisons via [email or web address] email, please do not reply via In Basket      Thank you  Lola Lindquist

## 2022-09-30 ENCOUNTER — REMOTE DEVICE CLINIC VISIT (OUTPATIENT)
Dept: CARDIOLOGY CLINIC | Facility: CLINIC | Age: 77
End: 2022-09-30
Payer: MEDICARE

## 2022-09-30 DIAGNOSIS — Z95.0 CARDIAC PACEMAKER IN SITU: Primary | ICD-10-CM

## 2022-09-30 PROCEDURE — 93294 REM INTERROG EVL PM/LDLS PM: CPT | Performed by: INTERNAL MEDICINE

## 2022-09-30 PROCEDURE — 93296 REM INTERROG EVL PM/IDS: CPT | Performed by: INTERNAL MEDICINE

## 2022-09-30 NOTE — PROGRESS NOTES
Results for orders placed or performed in visit on 09/30/22   Cardiac EP device report    Narrative    MDT-DUAL CHAMBER PPM (AAIR-DDDR MODE)/ ACTIVE SYSTEM IS MRI CONDITIONAL  CARELINK TRANSMISSION: BATTERY VOLTAGE ADEQUATE (12 4 YRS)  AP-91%, <0 1%  ALL AVAILABLE LEAD PARAMETERS WITHIN NORMAL LIMITS  NO SIGNIFICANT HIGH RATE EPISODES  NORMAL DEVICE FUNCTION   GV

## 2022-10-03 ENCOUNTER — TELEPHONE (OUTPATIENT)
Dept: NON INVASIVE DIAGNOSTICS | Facility: CLINIC | Age: 77
End: 2022-10-03

## 2022-10-04 ENCOUNTER — HOSPITAL ENCOUNTER (OUTPATIENT)
Dept: RADIOLOGY | Facility: HOSPITAL | Age: 77
Discharge: HOME | End: 2022-10-04
Payer: MEDICARE

## 2022-10-04 DIAGNOSIS — D32.9 MENINGIOMA (HCC): ICD-10-CM

## 2022-10-04 PROCEDURE — 70553 MRI BRAIN STEM W/O & W/DYE: CPT

## 2022-10-04 PROCEDURE — A9585 GADOBUTROL INJECTION: HCPCS | Performed by: RADIOLOGY

## 2022-10-04 RX ADMIN — GADOBUTROL 6 ML: 604.72 INJECTION INTRAVENOUS at 12:06

## 2022-10-04 NOTE — NURSING NOTE
Device interrogation done by SPRING Jordan clinical specialist  Device set to MRI safe mode  MRI of brain w/wout contrast complete  Pt tolerated well  VSS  Pt alert and oriented x4 on room air  No complaints or visible signs of distress  Device reprogrammed to prior settings per cardiology by Adrien Ahuja RN

## 2022-10-04 NOTE — NURSING NOTE
Device interrogation for MRI  Normal device function prior to MRI  Leads and device meet all requirements per policy for MRI  Device programmed AOO 80bpm per Cardiology for MRI  Patient has no complaints  Vital signs monitored throughout by A  COLLEEN Casanova  Normal device function post MRI  Device reprogrammed to prior settings per Cardiology

## 2022-10-05 ENCOUNTER — HOSPITAL ENCOUNTER (OUTPATIENT)
Dept: NON INVASIVE DIAGNOSTICS | Facility: CLINIC | Age: 77
Discharge: HOME | End: 2022-10-05
Payer: MEDICARE

## 2022-10-05 DIAGNOSIS — R07.9 CHEST PAIN, UNSPECIFIED TYPE: ICD-10-CM

## 2022-10-05 LAB
BASELINE ST DEPRESSION: 0 MM
CHEST PAIN STATEMENT: NORMAL
MAX DIASTOLIC BP: 84 MMHG
MAX HEART RATE: 82 BPM
MAX HR: 82 BPM
MAX PREDICTED HEART RATE: 142 BPM
MAX. SYSTOLIC BP: 154 MMHG
NUC STRESS EJECTION FRACTION: 70 %
PROTOCOL NAME: NORMAL
RATE PRESSURE PRODUCT: 9000
REASON FOR TERMINATION: NORMAL
SL CV REST NUCLEAR ISOTOPE DOSE: 10.26 MCI
SL CV STRESS NUCLEAR ISOTOPE DOSE: 32.9 MCI
SL CV STRESS RECOVERY BP: NORMAL MMHG
SL CV STRESS RECOVERY HR: 74 BPM
SL CV STRESS RECOVERY O2 SAT: 97 %
STRESS ANGINA INDEX: 1
STRESS BASELINE BP: NORMAL MMHG
STRESS BASELINE HR: 61 BPM
STRESS O2 SAT REST: 97 %
STRESS PEAK HR: 75 BPM
STRESS POST O2 SAT PEAK: 97 %
STRESS POST PEAK BP: 120 MMHG
STRESS/REST PERFUSION RATIO: 1.04
TARGET HR FORMULA: NORMAL
TEST INDICATION: NORMAL
TIME IN EXERCISE PHASE: NORMAL

## 2022-10-05 PROCEDURE — A9502 TC99M TETROFOSMIN: HCPCS

## 2022-10-05 PROCEDURE — 93017 CV STRESS TEST TRACING ONLY: CPT

## 2022-10-05 PROCEDURE — G1004 CDSM NDSC: HCPCS

## 2022-10-05 PROCEDURE — 93018 CV STRESS TEST I&R ONLY: CPT | Performed by: INTERNAL MEDICINE

## 2022-10-05 PROCEDURE — 78452 HT MUSCLE IMAGE SPECT MULT: CPT

## 2022-10-05 PROCEDURE — 93016 CV STRESS TEST SUPVJ ONLY: CPT | Performed by: INTERNAL MEDICINE

## 2022-10-05 RX ADMIN — REGADENOSON 0.4 MG: 0.08 INJECTION, SOLUTION INTRAVENOUS at 08:53

## 2022-10-06 ENCOUNTER — OFFICE VISIT (OUTPATIENT)
Dept: GYNECOLOGY | Facility: CLINIC | Age: 77
End: 2022-10-06
Payer: MEDICARE

## 2022-10-06 ENCOUNTER — OFFICE VISIT (OUTPATIENT)
Dept: CARDIOLOGY CLINIC | Facility: CLINIC | Age: 77
End: 2022-10-06
Payer: MEDICARE

## 2022-10-06 VITALS
BODY MASS INDEX: 29.64 KG/M2 | SYSTOLIC BLOOD PRESSURE: 124 MMHG | DIASTOLIC BLOOD PRESSURE: 80 MMHG | WEIGHT: 157 LBS | HEIGHT: 61 IN

## 2022-10-06 VITALS
BODY MASS INDEX: 29.4 KG/M2 | SYSTOLIC BLOOD PRESSURE: 154 MMHG | DIASTOLIC BLOOD PRESSURE: 80 MMHG | HEART RATE: 60 BPM | WEIGHT: 159.8 LBS | HEIGHT: 62 IN | OXYGEN SATURATION: 97 %

## 2022-10-06 DIAGNOSIS — I48.0 PAROXYSMAL ATRIAL FIBRILLATION (HCC): ICD-10-CM

## 2022-10-06 DIAGNOSIS — E78.2 MIXED HYPERLIPIDEMIA: ICD-10-CM

## 2022-10-06 DIAGNOSIS — R07.89 OTHER CHEST PAIN: ICD-10-CM

## 2022-10-06 DIAGNOSIS — Z78.0 POSTMENOPAUSAL: Primary | ICD-10-CM

## 2022-10-06 DIAGNOSIS — N95.2 VAGINAL ATROPHY: ICD-10-CM

## 2022-10-06 DIAGNOSIS — I27.20 PULMONARY HYPERTENSION (HCC): ICD-10-CM

## 2022-10-06 DIAGNOSIS — Z95.0 S/P PLACEMENT OF CARDIAC PACEMAKER: ICD-10-CM

## 2022-10-06 DIAGNOSIS — I10 ESSENTIAL HYPERTENSION: Primary | ICD-10-CM

## 2022-10-06 DIAGNOSIS — R32 URINARY INCONTINENCE, UNSPECIFIED TYPE: ICD-10-CM

## 2022-10-06 DIAGNOSIS — I50.32 CHRONIC DIASTOLIC HEART FAILURE (HCC): ICD-10-CM

## 2022-10-06 DIAGNOSIS — E11.9 TYPE 2 DIABETES MELLITUS WITHOUT COMPLICATION, WITHOUT LONG-TERM CURRENT USE OF INSULIN (HCC): ICD-10-CM

## 2022-10-06 DIAGNOSIS — Z12.31 SCREENING MAMMOGRAM FOR BREAST CANCER: ICD-10-CM

## 2022-10-06 DIAGNOSIS — I50.32 CHRONIC DIASTOLIC CHF (CONGESTIVE HEART FAILURE) (HCC): ICD-10-CM

## 2022-10-06 DIAGNOSIS — I48.19 PERSISTENT ATRIAL FIBRILLATION (HCC): ICD-10-CM

## 2022-10-06 PROCEDURE — 99214 OFFICE O/P EST MOD 30 MIN: CPT | Performed by: INTERNAL MEDICINE

## 2022-10-06 PROCEDURE — G0101 CA SCREEN;PELVIC/BREAST EXAM: HCPCS | Performed by: PHYSICIAN ASSISTANT

## 2022-10-06 RX ORDER — METOPROLOL SUCCINATE 25 MG/1
75 TABLET, EXTENDED RELEASE ORAL EVERY 12 HOURS SCHEDULED
Qty: 540 TABLET | Refills: 3 | Status: SHIPPED | OUTPATIENT
Start: 2022-10-06 | End: 2022-11-05

## 2022-10-06 RX ORDER — LOSARTAN POTASSIUM 50 MG/1
50 TABLET ORAL 2 TIMES DAILY
Qty: 180 TABLET | Refills: 3
Start: 2022-10-06

## 2022-10-06 NOTE — PROGRESS NOTES
Cardiology Follow Up    Andres Steven  1945  [id number]  [address]    1  Essential hypertension  losartan (COZAAR) 50 mg tablet    metoprolol succinate (TOPROL-XL) 25 mg 24 hr tablet   2  Paroxysmal atrial fibrillation (HCC)  losartan (COZAAR) 50 mg tablet   3  Chronic diastolic CHF (congestive heart failure) ([facility] )     4  Pulmonary hypertension ([facility] )     5  Type 2 diabetes mellitus without complication, without long-term current use of insulin ([facility] )     6  S/P placement of cardiac pacemaker     7  Mixed hyperlipidemia     8  Other chest pain     9  Persistent atrial fibrillation (HCC)  metoprolol succinate (TOPROL-XL) 25 mg 24 hr tablet   10  Chronic diastolic heart failure (HCC)  metoprolol succinate (TOPROL-XL) 25 mg 24 hr tablet       Discussion/Summary:  Overall she has been doing well following her AFib ablation  She had some atypical chest pain with a negative Lexiscan Myoview  Chest pain has resolved I think this is more likely related to blood pressure  Up titrate losartan to 50 mg b i d  continue amlodipine  I will see her back in a few months  No signs decompensated heart failure  She has a large meningioma and is undergoing evaluation by neuro surgery  Interval History:  Very 66 yo pleasant female with  paroxysmal atrial fibrillation, hypertension, hyperlipidemia presents for a follow-up visit  She continues to remain in sinus rhythm on sotalol  Overall she has been doing well still has some shortness of breath and is back into atrial fibrillation  She has been switched to amiodarone but had breakthrough again  Pacemaker has been protecting her from the slow events  This is likely what led to her VF during the 1st ablation  Rates have been stable  Atrial fibrillation has been present for the last few days  She presents for an acute visit she had some episodes of chest pain    This occurred about 2 weeks ago  She had sharp pain in the center of the chest which was well located to 1 point  It was tender to touch  Did not occur with exertion  Pily Samir was performed showed no ischemia  She has not had any recurrence of symptoms  She did note that this her blood pressure was running on the higher side recently  Problem List     Generalized edema    Essential hypertension    Dyslipidemia    Sacroiliitis (HCC)    Acute pain of right knee    Allergic rhinitis    Fibromyalgia    Hyperlipidemia    Insomnia    Lumbar spondylosis    Lumbar stenosis    Osteoarthrosis, hand    Osteoarthritis of knee    Overview Signed 10/4/2018  5:41 PM by Dayan Sutton MD     Laterality: Right         Osteopenia    Paroxysmal atrial fibrillation (HCC)    Peripheral neuropathy    Restless legs syndrome    Right lumbar radiculopathy    TMJ syndrome    Vitamin D deficiency    Effusion of right knee        Past Medical History:   Diagnosis Date    Actinic keratosis     last assessed - [id number]    Arthritis     Atrial fibrillation with rapid ventricular response ([facility] )     last assessed - [id number]    Basal cell carcinoma     Benign colon polyp     last assessed - [id number]    CHF (congestive heart failure) ([facility] ) 06/2022    Disease of thyroid gland     Effusion of knee joint right     Resolved - [id number]    Esophageal reflux     Fibromyalgia     last assessed - [id number]    Fibromyalgia, primary     GERD (gastroesophageal reflux disease)     Hypertension     Palpitations     last assessed - [id number]    Peroneal tendonitis, right     last assessed - [id number]    Right lumbar radiculopathy 03/17/2016    Thyroid cancer ([facility] )     Thyroid nodule     Trochanteric bursitis of left hip 03/09/2018     Social History     Socioeconomic History    Marital status:       Spouse name: Not on file    Number of children: Not on file    Years of education: Not on file    Highest education level: Not on file   Occupational History    Not on file   Tobacco Use    Smoking status: Never Smoker    Smokeless tobacco: Never Used   Vaping Use    Vaping Use: Never used   Substance and Sexual Activity    Alcohol use: Not Currently    Drug use: Never    Sexual activity: Not Currently   Other Topics Concern    Not on file   Social History Narrative    Not on file     Social Determinants of Health     Financial Resource Strain: Low Risk     Difficulty of Paying Living Expenses: Not hard at all   Food Insecurity: Not on file   Transportation Needs: No Transportation Needs    Lack of Transportation (Medical): No    Lack of Transportation (Non-Medical):  No   Physical Activity: Not on file   Stress: Not on file   Social Connections: Not on file   Intimate Partner Violence: Not on file   Housing Stability: Not on file      Family History   Problem Relation Age of Onset    Heart disease Mother     Diabetes Mother     Heart disease Father     Coronary artery disease Father     Stroke Father         cerebrovascular accident    Heart attack Father         myocardial infarction    Sudden death Father         scd    Other Family         Back disorder    Coronary artery disease Family     Neuropathy Family     Osteoporosis Family     No Known Problems Daughter     No Known Problems Maternal Grandmother     No Known Problems Maternal Grandfather     No Known Problems Paternal Grandmother     No Known Problems Paternal Grandfather     Cancer Maternal Uncle     Breast cancer Maternal Aunt 72    No Known Problems Son     No Known Problems Maternal Aunt     No Known Problems Maternal Aunt     No Known Problems Maternal Aunt     No Known Problems Paternal Aunt     No Known Problems Paternal Aunt     Anuerysm Neg Hx     Clotting disorder Neg Hx     Arrhythmia Neg Hx     Heart failure Neg Hx      Past Surgical History:   Procedure Laterality Date    CARDIAC ELECTROPHYSIOLOGY STUDY AND ABLATION 08/2022    CATARACT EXTRACTION Bilateral     COLONOSCOPY  03/2018    COLONOSCOPY W/ POLYPECTOMY  2021    repeat in 5 years    EYE SURGERY      HYSTERECTOMY      JOINT REPLACEMENT Left     knee    NC REVISE MEDIAN N/CARPAL TUNNEL SURG Right 11/14/2019    Procedure: RELEASE CARPAL TUNNEL;  Surgeon: Sathish Allred MD;  [facility];  Service: Orthopedics    NC THYROID LOBECTOMY,UNILAT Left 12/16/2020    Procedure: Left THYROID LOBECTOMY;  Surgeon: Ericka Bedoya MD;  [facility];  Service: ENT    RECTAL POLYPECTOMY      REPLACEMENT TOTAL KNEE Left     last assessed - [id number]    TONSILLECTOMY      TOTAL ABDOMINAL HYSTERECTOMY      TUBAL LIGATION      US GUIDED THYROID BIOPSY  10/14/2020       Current Outpatient Medications:     amiodarone 200 mg tablet, Take 1 tablet (200 mg total) by mouth daily, Disp: 90 tablet, Rfl: 3    amLODIPine (NORVASC) 5 mg tablet, Take 1 tablet (5 mg total) by mouth daily, Disp: 30 tablet, Rfl: 3    apixaban (ELIQUIS) 5 mg, Take 1 tablet (5 mg total) by mouth 2 (two) times a day, Disp: 56 tablet, Rfl: 0    ascorbic acid (VITAMIN C) 500 mg tablet, Take 500 mg by mouth daily , Disp: , Rfl:     aspirin 81 mg chewable tablet, Chew 1 tablet (81 mg total) daily, Disp: 30 tablet, Rfl: 3    Cholecalciferol 50 MCG (2000 UT) CAPS, Take 2,000 Units by mouth 2 (two) times a day  , Disp: , Rfl:     Chromium Picolinate (CHROMIUM PICOLATE PO), Take 1 tablet by mouth daily, Disp: , Rfl:     DULoxetine (CYMBALTA) 30 mg delayed release capsule, Take 1 capsule (30 mg total) by mouth daily after breakfast, Disp: 30 capsule, Rfl: 2    ezetimibe (ZETIA) 10 mg tablet, Take 1 tablet (10 mg total) by mouth daily, Disp: 90 tablet, Rfl: 3    famotidine (PEPCID) 20 mg tablet, Take 20 mg by mouth every other day  , Disp: , Rfl:     furosemide (LASIX) 20 mg tablet, Take one tab daily when needed for shortness of breath, weight gain 3 pounds in one day or Lower leg swelling , Disp: 10 tablet, Rfl: 3    gabapentin (NEURONTIN) 300 mg capsule, Take 1 capsule (300 mg total) by mouth daily at bedtime (Patient taking differently: Take 600 mg by mouth daily at bedtime), Disp: 30 capsule, Rfl: 5    ipratropium (ATROVENT) 0 03 % nasal spray, 2 sprays into each nostril 3 (three) times a day Begin once per day, then progress to twice per day  Progress to three times a day as tolerated  , Disp: 90 mL, Rfl: 3    lidocaine (XYLOCAINE) 5 % ointment, Apply topically as needed for mild pain, Disp: 35 44 g, Rfl: 0    losartan (COZAAR) 50 mg tablet, Take 1 tablet (50 mg total) by mouth 2 (two) times a day, Disp: 180 tablet, Rfl: 3    metoprolol succinate (TOPROL-XL) 25 mg 24 hr tablet, Take 3 tablets (75 mg total) by mouth every 12 (twelve) hours, Disp: 540 tablet, Rfl: 3    pantoprazole (PROTONIX) 20 mg tablet, Take 20 mg by mouth daily before breakfast, Disp: , Rfl:     rOPINIRole (REQUIP) 0 5 mg tablet, Take 1 tablet (0 5 mg total) by mouth daily at bedtime, Disp: 90 tablet, Rfl: 3    traMADol (ULTRAM) 50 mg tablet, Take 1 tablet (50 mg total) by mouth 2 (two) times a day as needed for moderate pain, Disp: 60 tablet, Rfl: 3    TURMERIC PO, Take 1 capsule by mouth 2 (two) times a day, Disp: , Rfl:     zolpidem (AMBIEN) 10 mg tablet, Take 1 tablet (10 mg total) by mouth daily at bedtime as needed for sleep, Disp: 90 tablet, Rfl: 1    pantoprazole (PROTONIX) 20 mg tablet, Take 1 tablet by mouth daily before breakfast, Disp: , Rfl:   Allergies   Allergen Reactions    Sotalol Other (See Comments)     Prolonged QTc leading to torsades de pointes     Penicillins Other (See Comments)     As a child calcium deposit in the arm     Ace Inhibitors GI Intolerance     Did feel well on it    Omeprazole Abdominal Pain, Rash and Vomiting     stomach pain, vomiting, rash       Labs:     Chemistry        Component Value Date/Time     05/06/2015 1116    K 4 2 07/11/2022 0841    K 4 8 05/06/2015 1116     07/11/2022 0841    CL 99 (L) 05/06/2015 1116    CO2 29 07/11/2022 0841    CO2 28 05/06/2015 1116    BUN 20 07/11/2022 0841    BUN 19 05/06/2015 1116    CREATININE 1 13 07/11/2022 0841    CREATININE 0 97 05/06/2015 1116        Component Value Date/Time    CALCIUM 9 9 07/11/2022 0841    CALCIUM 9 0 05/06/2015 1116    ALKPHOS 104 07/11/2022 0841    ALKPHOS 60 05/06/2015 1116    AST 29 07/11/2022 0841    AST 28 05/06/2015 1116    ALT 40 07/11/2022 0841    ALT 34 05/06/2015 1116    BILITOT 0 78 05/06/2015 1116            Lab Results   Component Value Date    CHOL 205 05/06/2015    CHOL 195 07/10/2014     Lab Results   Component Value Date    HDL 66 04/01/2021    HDL 54 08/03/2020    HDL 57 11/25/2019     Lab Results   Component Value Date    LDLCALC 88 04/01/2021    LDLCALC 104 (H) 08/03/2020    LDLCALC 106 (H) 11/25/2019     Lab Results   Component Value Date    TRIG 112 04/01/2021    TRIG 121 08/03/2020    TRIG 106 11/25/2019     No results found for: CHOLHDL    Imaging: No results found  ECG:    Sinus bradycardia nonspecific T-wave changes     Review of Systems   Constitutional: Negative  HENT: Negative  Eyes: Negative  Cardiovascular: Negative  Negative for leg swelling  Respiratory: Negative  Endocrine: Negative  Hematologic/Lymphatic: Negative  Skin: Negative  Musculoskeletal: Negative  Gastrointestinal: Negative  Genitourinary: Negative  Neurological: Negative  Psychiatric/Behavioral: Negative  Vitals:    10/06/22 1255   BP: 154/80   Pulse: 60   SpO2: 97%     Vitals:    10/06/22 1255   Weight: 72 5 kg (159 lb 12 8 oz)     Height: 5' 1 5" (156 2 cm)   Body mass index is 29 7 kg/m²      Physical Exam:  Vital signs reviewed  General:  Alert and cooperative, appears stated age, no acute distress  HEENT:  PERRLA, EOMI, no scleral icterus, no conjunctival pallor  Neck:  No lymphadenopathy, no thyromegaly, no carotid bruits, no elevated JVP  Heart:  Regular rate and rhythm, normal S1/S2, no S3/S4, no murmur, rubs or gallops  PMI nondisplaced  Lungs:  Clear to auscultation bilaterally, no wheezes rales or rhonchi  Abdomen:  Soft, non-tender, positive bowel sounds, no rebound or guarding,   no organomegaly   Extremities:  Normal range of motion    No clubbing, cyanosis or edema   Vascular:  2+ pedal pulses  Skin:  No rashes or lesions on exposed skin  Neurologic:  Cranial nerves II-XII grossly intact without focal deficits  Psych:  Normal mood and affect

## 2022-10-10 DIAGNOSIS — F33.0 MILD EPISODE OF RECURRENT MAJOR DEPRESSIVE DISORDER (HCC): ICD-10-CM

## 2022-10-10 RX ORDER — DULOXETIN HYDROCHLORIDE 30 MG/1
30 CAPSULE, DELAYED RELEASE ORAL
Qty: 30 CAPSULE | Refills: 2 | Status: SHIPPED | OUTPATIENT
Start: 2022-10-10

## 2022-10-17 ENCOUNTER — TELEPHONE (OUTPATIENT)
Dept: FAMILY MEDICINE CLINIC | Facility: CLINIC | Age: 77
End: 2022-10-17

## 2022-11-14 DIAGNOSIS — I48.0 PAROXYSMAL ATRIAL FIBRILLATION (HCC): ICD-10-CM

## 2022-11-16 ENCOUNTER — TELEPHONE (OUTPATIENT)
Dept: FAMILY MEDICINE CLINIC | Facility: CLINIC | Age: 77
End: 2022-11-16

## 2022-11-16 NOTE — TELEPHONE ENCOUNTER
Patient started with diarrhea last Wed  With abdominal cramping  No vomitting or other symptoms  She took immodium but not consistently  Can you recommend a treatment plan for her to try at home

## 2022-11-21 ENCOUNTER — TELEPHONE (OUTPATIENT)
Dept: CARDIOLOGY CLINIC | Facility: CLINIC | Age: 77
End: 2022-11-21

## 2022-11-21 NOTE — PROGRESS NOTES
Cardiology Follow Up    Margaret Bailey  1945  [id number]  [address] [facility] [address] [phone]    1  Hypotension, unspecified hypotension type        2  Diarrhea, unspecified type  Basic metabolic panel    Magnesium    Giardia, EIA, Ova/Parasite      3  Paroxysmal atrial fibrillation (HCC)        4  Pacemaker        5  Chronic diastolic CHF (congestive heart failure) ([address] )        6  Mixed hyperlipidemia        7  Type 2 diabetes mellitus without complication, without long-term current use of insulin [facility])            Interval History:   Ms Sheri Baker called our office with reports of low BP in am on 11/20 89/48 and on 11/21 85/39  She denies lightheadedness or dizziness with low BP  Susan Lozada was scheduled to be seen in our office  Susan Lozada presents to our office for an acute visit due to low BP in am   She is accompanied by her daughter who assists her with care  According to Susan Lozada for the past week she is  experiencing diarrhea at night  Susan Lozada is experiencing fredis ankle edema in am and low BP in am    She is drinking Pedialite with improved BP this am   She also has a new BP machine  Susan Lozada  is taking Amlodipine at night  This medication was started around 9/28/22  She continues with occasional stabbing CP and flutters  Susan Lozada denies lightheadedness, dizziness or worsening shortness of breath  Medical History   Primary Cardiologist Dr Zane Perez   Paroxysmal atrial fibrillation [id number], on Eliquis 5mg BID for stroke prevention,  3/12/21 sp cryoablation with pulmonary vein isolation, DCCV from AF to NSR    Placed on Flecainide post procedure  8/02/22 sp atrial fibrillation ablation   Pulmonary vein isolation, rare isolation of right vein, and a straight modification with posterior wall modification  Successful CTI flutter ablation with by directional block   Elevated LA pressure and moderate 60-70%  by Dr Stan Stephens   On 1/06/22 Ms Crystal Judge underwent DCCV single biphasic shock 200J to NSR    ,  SSS,  3/15/21 Sp MDT dual chamber PPM insertion ,   Hypertension  Chronic HFpEF LVEF 55% by TTE 1/04/21 dry weight ? 159 pounds  Mild to mod TR per TTE 1/04/21   Hyperlipidemia 4/01/21 , , HDL 66, LDL 88   12/2020 Thyroid nodule Hurthle cell adenoma with degenerative changes   sp Lobectomy  10/27/21 TSH 0 975  2016 Sleep study negative for sleep apnea   Colonoscopy 8/2021 11/09/21 PFT"s : FEV1/FVC ratio 81%, FVC 2 67 L 103% predicted, FEV1 2 18 L 105% predicted,   Mildly reduced diffusion capacity      Admitted to [address] 6/13 - 6/17/22 with acute heart failure   discharge weight 160 pounds     Allergies: Sotalol- prolonged QT leading to  torsades de pointes     10/05/22 Rx Nuclear stress test negative for ischemia      4/26/22 MRI of the brain showed  Extra-axial mass within the inferior lateral aspect of the left anterior cranial fossa   Mild adjacent dural thickening and enhancement seen extending inferiorly into the middle cranial fossa and superiorly into the dura of the anterior frontal vertex   Findings suggestive of meningioma     6/15/22 TTE:  Left ventricle cavity size normal wall thickness increased LVEF 90% systolic function normal wall motion normal   Right ventricular cavity size normal systolic function normal   Left atrium dilated     Patient Active Problem List   Diagnosis   • Essential hypertension   • Other chest pain   • Allergic rhinitis   • Fibromyalgia   • Hyperlipidemia   • Insomnia   • Lumbar spondylosis   • Lumbar stenosis   • Osteoarthrosis, hand   • Osteoarthritis of knee   • Osteopenia   • Paroxysmal atrial fibrillation (HCC)   • Peripheral neuropathy   • Restless legs syndrome   • TMJ syndrome   • Vitamin D deficiency   • Osteoarthritis of shoulder   • Carpal tunnel syndrome on right   • Arthritis of both acromioclavicular joints   • Chronic rhinitis   • Chronic diastolic CHF (congestive heart failure) (HCC)   • Malignant neoplasm of thyroid gland (HCC)   • Current use of long term anticoagulation   • S/P placement of cardiac pacemaker   • Tremors of nervous system   • Hx of diplopia   • Hurthle cell adenoma of thyroid   • MGUS (monoclonal gammopathy of unknown significance)   • Vasomotor rhinitis   • Chronic tension-type headache, not intractable   • Stage 3b chronic kidney disease (HCC)   • Meningioma (HCC)   • Continuous opioid dependence ([facility] )   • Hypomagnesemia   • Pulmonary hypertension ([facility])   • Type 2 diabetes mellitus without complication, without long-term current use of insulin ([facility] )     Past Medical History:   Diagnosis Date   • Actinic keratosis     last assessed - [id number]   • Arthritis    • Atrial fibrillation with rapid ventricular response ([facility] )     last assessed - 26Apr2016   • Basal cell carcinoma    • Benign colon polyp     last assessed - 27Apr2015   • CHF (congestive heart failure) ([facility] ) 06/2022   • Disease of thyroid gland    • Effusion of knee joint right     Resolved - [id number]   • Esophageal reflux    • Fibromyalgia     last assessed - [id number]   • Fibromyalgia, primary    • GERD (gastroesophageal reflux disease)    • Hypertension    • Palpitations     last assessed - 30Apr2013   • Peroneal tendonitis, right     last assessed - [id number]   • Right lumbar radiculopathy 03/17/2016   • Thyroid cancer ([facility] )    • Thyroid nodule    • Trochanteric bursitis of left hip 03/09/2018     Social History     Socioeconomic History   • Marital status:       Spouse name: Not on file   • Number of children: Not on file   • Years of education: Not on file   • Highest education level: Not on file   Occupational History   • Not on file   Tobacco Use   • Smoking status: Never   • Smokeless tobacco: Never   Vaping Use   • Vaping Use: Never used   Substance and Sexual Activity   • Alcohol use: Not Currently   • Drug use: Never   • Sexual activity: Not Currently   Other Topics Concern   • Not on file   Social History Narrative   • Not on file     Social Determinants of Health     Financial Resource Strain: Low Risk    • Difficulty of Paying Living Expenses: Not hard at all   Food Insecurity: Not on file   Transportation Needs: No Transportation Needs   • Lack of Transportation (Medical): No   • Lack of Transportation (Non-Medical):  No   Physical Activity: Not on file   Stress: Not on file   Social Connections: Not on file   Intimate Partner Violence: Not on file   Housing Stability: Not on file      Family History   Problem Relation Age of Onset   • Heart disease Mother    • Diabetes Mother    • Heart disease Father    • Coronary artery disease Father    • Stroke Father         cerebrovascular accident   • Heart attack Father         myocardial infarction   • Sudden death Father         scd   • Other Family         Back disorder   • Coronary artery disease Family    • Neuropathy Family    • Osteoporosis Family    • No Known Problems Daughter    • No Known Problems Maternal Grandmother    • No Known Problems Maternal Grandfather    • No Known Problems Paternal Grandmother    • No Known Problems Paternal Grandfather    • Cancer Maternal Uncle    • Breast cancer Maternal Aunt 72   • No Known Problems Son    • No Known Problems Maternal Aunt    • No Known Problems Maternal Aunt    • No Known Problems Maternal Aunt    • No Known Problems Paternal Aunt    • No Known Problems Paternal Aunt    • Anuerysm Neg Hx    • Clotting disorder Neg Hx    • Arrhythmia Neg Hx    • Heart failure Neg Hx      Past Surgical History:   Procedure Laterality Date   • CARDIAC ELECTROPHYSIOLOGY STUDY AND ABLATION  08/2022   • CATARACT EXTRACTION Bilateral    • COLONOSCOPY  03/2018   • COLONOSCOPY W/ POLYPECTOMY  2021    repeat in 5 years   • EYE SURGERY     • HYSTERECTOMY     • JOINT REPLACEMENT Left     knee   • FL REVISE MEDIAN N/CARPAL TUNNEL SURG Right 11/14/2019 Procedure: RELEASE CARPAL TUNNEL;  Surgeon: Hemalatha Levi MD;  [facility];  Service: Orthopedics   • GA THYROID LOBECTOMY,UNILAT Left 12/16/2020    Procedure: Left THYROID LOBECTOMY;  Surgeon: Jewel Deleon MD;  [facility];  Service: ENT   • RECTAL POLYPECTOMY     • REPLACEMENT TOTAL KNEE Left     last assessed - 27Apr2015   • TONSILLECTOMY     • TOTAL ABDOMINAL HYSTERECTOMY     • TUBAL LIGATION     • US GUIDED THYROID BIOPSY  10/14/2020       Current Outpatient Medications:   •  amiodarone 200 mg tablet, Take 1 tablet (200 mg total) by mouth daily, Disp: 90 tablet, Rfl: 3  •  amLODIPine (NORVASC) 5 mg tablet, Take 1 tablet (5 mg total) by mouth daily, Disp: 30 tablet, Rfl: 3  •  apixaban (ELIQUIS) 5 mg, Take 1 tablet (5 mg total) by mouth 2 (two) times a day, Disp: 48 tablet, Rfl: 0  •  ascorbic acid (VITAMIN C) 500 mg tablet, Take 500 mg by mouth daily , Disp: , Rfl:   •  aspirin 81 mg chewable tablet, Chew 1 tablet (81 mg total) daily, Disp: 30 tablet, Rfl: 3  •  Cholecalciferol 50 MCG (2000 UT) CAPS, Take 2,000 Units by mouth 2 (two) times a day  , Disp: , Rfl:   •  Chromium Picolinate (CHROMIUM PICOLATE PO), Take 1 tablet by mouth daily, Disp: , Rfl:   •  DULoxetine (CYMBALTA) 30 mg delayed release capsule, Take 1 capsule (30 mg total) by mouth daily after breakfast, Disp: 30 capsule, Rfl: 2  •  ezetimibe (ZETIA) 10 mg tablet, Take 1 tablet (10 mg total) by mouth daily, Disp: 90 tablet, Rfl: 3  •  famotidine (PEPCID) 20 mg tablet, Take 20 mg by mouth every other day  , Disp: , Rfl:   •  furosemide (LASIX) 20 mg tablet, Take one tab daily when needed for shortness of breath, weight gain 3 pounds in one day or Lower leg swelling , Disp: 10 tablet, Rfl: 3  •  gabapentin (NEURONTIN) 300 mg capsule, Take 1 capsule (300 mg total) by mouth daily at bedtime (Patient taking differently: Take 600 mg by mouth daily at bedtime), Disp: 30 capsule, Rfl: 5  •  ipratropium (ATROVENT) 0 03 % nasal spray, 2 sprays into each nostril 3 (three) times a day Begin once per day, then progress to twice per day  Progress to three times a day as tolerated  , Disp: 90 mL, Rfl: 3  •  lidocaine (XYLOCAINE) 5 % ointment, Apply topically as needed for mild pain, Disp: 35 44 g, Rfl: 0  •  losartan (COZAAR) 50 mg tablet, Take 1 tablet (50 mg total) by mouth 2 (two) times a day, Disp: 180 tablet, Rfl: 3  •  metoprolol succinate (TOPROL-XL) 25 mg 24 hr tablet, Take 3 tablets (75 mg total) by mouth every 12 (twelve) hours, Disp: 540 tablet, Rfl: 3  •  pantoprazole (PROTONIX) 20 mg tablet, Take 20 mg by mouth daily before breakfast, Disp: , Rfl:   •  pantoprazole (PROTONIX) 20 mg tablet, Take 1 tablet by mouth daily before breakfast, Disp: , Rfl:   •  rOPINIRole (REQUIP) 0 5 mg tablet, Take 1 tablet (0 5 mg total) by mouth daily at bedtime, Disp: 90 tablet, Rfl: 3  •  traMADol (ULTRAM) 50 mg tablet, Take 1 tablet (50 mg total) by mouth 2 (two) times a day as needed for moderate pain, Disp: 60 tablet, Rfl: 3  •  TURMERIC PO, Take 1 capsule by mouth 2 (two) times a day, Disp: , Rfl:   •  zolpidem (AMBIEN) 10 mg tablet, Take 1 tablet (10 mg total) by mouth daily at bedtime as needed for sleep, Disp: 90 tablet, Rfl: 1  Allergies   Allergen Reactions   • Sotalol Other (See Comments)     Prolonged QTc leading to torsades de pointes    • Penicillins Other (See Comments)     As a child calcium deposit in the arm    • Ace Inhibitors GI Intolerance     Did feel well on it   • Omeprazole Abdominal Pain, Rash and Vomiting     stomach pain, vomiting, rash       Labs:  Hospital Outpatient Visit on 10/05/2022   Component Date Value   • Protocol Name 10/05/2022 Joseph Mendez    • Time In Exercise Phase 10/05/2022 00:03:00    • MAX  SYSTOLIC BP 62/94/2395 469    • Max Diastolic Bp 60/30/3761 84    • Max Heart Rate 10/05/2022 82    • Max Predicted Heart Rate 10/05/2022 142    • Reason for Termination 10/05/2022 TEST COMPLETE       • Test Indication 10/05/2022 CHEST PAIN    • Target Hr Formular 10/05/2022 (220 - Age)*100%    • Chest Pain Statement 10/05/2022 non-limiting    Hospital Outpatient Visit on 10/05/2022   Component Date Value   • Baseline HR 10/05/2022 61    • Baseline BP 10/05/2022 154/84    • O2 sat rest 10/05/2022 97    • Stress peak HR 10/05/2022 75    • Post peak BP 10/05/2022 120    • Rate Pressure Product 10/05/2022 9,000 0    • O2 sat peak 10/05/2022 97    • Recovery HR 10/05/2022 74    • Recovery BP 10/05/2022 136/80    • O2 sat recovery 10/05/2022 97    • Max HR 10/05/2022 82    • Angina Index 10/05/2022 1    • Rest Nuclear Isotope Dose 10/05/2022 10 26    • Stress Nuclear Isotope D* 10/05/2022 32 90    • Base ST Depresion (mm) 10/05/2022 0    • Stress/rest perfusion ra* 10/05/2022 1 04    • EF (%) 10/05/2022 70      Imaging: No results found  Review of Systems:  Review of Systems    Physical Exam:  Physical Exam  Vitals reviewed  Constitutional:       Appearance: Normal appearance  Cardiovascular:      Rate and Rhythm: Normal rate and regular rhythm  Pulses: Normal pulses  Heart sounds: Normal heart sounds  Pulmonary:      Effort: Pulmonary effort is normal       Breath sounds: Normal breath sounds  Abdominal:      General: Bowel sounds are normal    Musculoskeletal:         General: Normal range of motion  Cervical back: Normal range of motion and neck supple  Right lower leg: Edema present  Left lower leg: Edema present  Comments: Trace fredis ankle edema    Skin:     General: Skin is warm and dry  Capillary Refill: Capillary refill takes less than 2 seconds  Neurological:      General: No focal deficit present  Mental Status: She is alert and oriented to person, place, and time  Psychiatric:         Mood and Affect: Mood normal          Behavior: Behavior normal      AP HR 61 BPM, BP RUE Sitting 124/70     Discussion/Summary:  1   Hypotension- occurring in am on 11/20 89/48 and on 11/21 85/39, asymptomatic  Diarrhea occurring during the night  Hypotension most likely due to diarrhea and being intravascularly dry in am upon waking  BP improved this am with hydrating yesterday  Stop Amlodipine and continue with home BP monitoring  2  Diarrhea- occurring during the night, possibly related to Amlodipine  Stop Amlodipine  Hold Zetia for 3 days, continue on a bland BRAT diet and hydrating with 1800 cc to 2 liters a day  Stool sample to be followed by PCP  BMP and mag to be done today evaluate for electrolyte abnormality  3  Paroxysmal atrial fibrillation [id number], on Eliquis 5mg BID for stroke prevention, samples provided during the office visit  3/12/21 sp cryoablation with pulmonary vein isolation, DCCV from AF to NSR    Placed on Flecainide post procedure  8/02/22 sp atrial fibrillation ablation   Pulmonary vein isolation, rare isolation of right vein, and a straight modification with posterior wall modification  Successful CTI flutter ablation with by directional block  Elevated LA pressure and moderate 60-70%  by Dr Marleen Chiu ,On 1/06/22 Ms Ant Rodríguez underwent DCCV single biphasic shock 200J to NSR  Now off Flecainide, Continue on  Metoprolol succinate 75 mg q 12 hours  4  SSS,  3/15/21 Sp MDT dual chamber PPM insertion , 9/30/22 interrogation showed AP 91%,  <0 1%, all  Lead parameters within normal limits no  No significant high rates  Normal device function  5  Chronic HFpEF LVEF 55% by TTE 1/04/21 dry weight ? 159 pounds  Weight in the office 158 pounds  HR and BP controlled, On PE eu volemic, experiencing diarrhea  Possibly due to Amlodipine  Will stop Amlodipine today  Continue metoprolol succinate 75 mg q 12 hours, Lasix 20mg daily   6  Hyperlipidemia 4/01/21 , , HDL 66, LDL 88  continue on Zetia 10 mg daily      7  HgbA1C 7 3 on 7/11/22

## 2022-11-21 NOTE — TELEPHONE ENCOUNTER
Sunni Timmons is scheduled with Deb Farris tomorrow at 9:20  She will bring her BP cuff from home to compare readings

## 2022-11-21 NOTE — TELEPHONE ENCOUNTER
Anuradha Tran called to let you know that her BP in the AM last been in the 80s the last two mornings  Yesterday AM BP 89/48  This AM, BP  85/39  Yesterday, her BP did come up to the 140s, 150s sys which is her normal BP  She uses a wrist cuff  HR in 60s and 70s  Other days recently, BP low 100s, one teens in the AM, but always comes up in the afternoon and throughout the day  Only lower readings are in the AM     She is asymptomatic except for feeling some fatigue  Anuradha Leary also said she has been having diarrhea for the last two weeks  She is contacting her PCP today regarding the ongoing diarrhea  Also, her lasix is prescribed PRN, but Anuradha Tran has taking 20 mg daily for the last several days due to edema in her feet  The lasix has not helped the edema  Her weight is stable at 153-154 lbs  Also taking:  Amlodipine 5 mg daily  Amiodarone 200 mg daily  Losartan 50mg BID  Toprol XL 75 mg BID    Please advise

## 2022-11-22 ENCOUNTER — APPOINTMENT (OUTPATIENT)
Dept: LAB | Facility: CLINIC | Age: 77
End: 2022-11-22

## 2022-11-22 ENCOUNTER — APPOINTMENT (OUTPATIENT)
Dept: LAB | Facility: MEDICAL CENTER | Age: 77
End: 2022-11-22

## 2022-11-22 ENCOUNTER — OFFICE VISIT (OUTPATIENT)
Dept: CARDIOLOGY CLINIC | Facility: CLINIC | Age: 77
End: 2022-11-22

## 2022-11-22 VITALS
OXYGEN SATURATION: 100 % | HEART RATE: 93 BPM | WEIGHT: 158.9 LBS | BODY MASS INDEX: 29.24 KG/M2 | HEIGHT: 62 IN | DIASTOLIC BLOOD PRESSURE: 68 MMHG | SYSTOLIC BLOOD PRESSURE: 114 MMHG

## 2022-11-22 DIAGNOSIS — I50.32 CHRONIC DIASTOLIC CHF (CONGESTIVE HEART FAILURE) (HCC): ICD-10-CM

## 2022-11-22 DIAGNOSIS — E78.2 MIXED HYPERLIPIDEMIA: ICD-10-CM

## 2022-11-22 DIAGNOSIS — I48.0 PAROXYSMAL ATRIAL FIBRILLATION (HCC): ICD-10-CM

## 2022-11-22 DIAGNOSIS — R19.7 DIARRHEA, UNSPECIFIED TYPE: ICD-10-CM

## 2022-11-22 DIAGNOSIS — E11.9 TYPE 2 DIABETES MELLITUS WITHOUT COMPLICATION, WITHOUT LONG-TERM CURRENT USE OF INSULIN (HCC): ICD-10-CM

## 2022-11-22 DIAGNOSIS — I95.9 HYPOTENSION, UNSPECIFIED HYPOTENSION TYPE: Primary | ICD-10-CM

## 2022-11-22 DIAGNOSIS — Z95.0 PACEMAKER: ICD-10-CM

## 2022-11-22 LAB
ANION GAP SERPL CALCULATED.3IONS-SCNC: 4 MMOL/L (ref 4–13)
BUN SERPL-MCNC: 15 MG/DL (ref 5–25)
CALCIUM SERPL-MCNC: 9.8 MG/DL (ref 8.3–10.1)
CHLORIDE SERPL-SCNC: 100 MMOL/L (ref 96–108)
CO2 SERPL-SCNC: 29 MMOL/L (ref 21–32)
CREAT SERPL-MCNC: 1.14 MG/DL (ref 0.6–1.3)
GFR SERPL CREATININE-BSD FRML MDRD: 46 ML/MIN/1.73SQ M
GLUCOSE SERPL-MCNC: 126 MG/DL (ref 65–140)
MAGNESIUM SERPL-MCNC: 2.1 MG/DL (ref 1.6–2.6)
POTASSIUM SERPL-SCNC: 4.1 MMOL/L (ref 3.5–5.3)
SODIUM SERPL-SCNC: 133 MMOL/L (ref 135–147)

## 2022-11-22 NOTE — PATIENT INSTRUCTIONS
Maintain a 2 gram daily sodium diet and 1800 ml - 2 liters daily fluid restriction  Check daily weights  If you gain 3 pounds in one day, 5 pounds in one week, or experience worsening shortness of breath or increasing lower leg swelling  Please call the heart failure office at [phone]    Please bring a  list of your current medications and daily weights to the office visit     Stop amlodipine  Lab studies  Call our office on Monday to evaluate symptoms

## 2022-11-23 DIAGNOSIS — E87.1 HYPONATREMIA: Primary | ICD-10-CM

## 2022-11-24 LAB — G LAMBLIA AG STL QL IA: NEGATIVE

## 2022-12-27 DIAGNOSIS — I10 HYPERTENSION, UNSPECIFIED TYPE: Primary | ICD-10-CM

## 2022-12-27 RX ORDER — AMLODIPINE BESYLATE 5 MG/1
5 TABLET ORAL DAILY
Qty: 90 TABLET | Refills: 3 | Status: SHIPPED | OUTPATIENT
Start: 2022-12-27 | End: 2023-08-28

## 2022-12-27 NOTE — PROGRESS NOTES
Farnaz's daughter Vickie Mccoy contacted me November 28th. She informed me the diarrhea most likely due to Melatonin gummies. Norvasc restarted at that time.

## 2022-12-30 ENCOUNTER — REMOTE DEVICE CLINIC VISIT (OUTPATIENT)
Dept: CARDIOLOGY CLINIC | Facility: CLINIC | Age: 77
End: 2022-12-30

## 2022-12-30 DIAGNOSIS — F33.0 MILD EPISODE OF RECURRENT MAJOR DEPRESSIVE DISORDER (HCC): ICD-10-CM

## 2022-12-30 DIAGNOSIS — Z95.0 PRESENCE OF PERMANENT CARDIAC PACEMAKER: Primary | ICD-10-CM

## 2022-12-30 RX ORDER — DULOXETIN HYDROCHLORIDE 30 MG/1
CAPSULE, DELAYED RELEASE ORAL
Qty: 30 CAPSULE | Refills: 2 | Status: SHIPPED | OUTPATIENT
Start: 2022-12-30

## 2023-01-02 DIAGNOSIS — I48.19 PERSISTENT ATRIAL FIBRILLATION (HCC): ICD-10-CM

## 2023-01-03 RX ORDER — FUROSEMIDE 20 MG/1
TABLET ORAL
Qty: 10 TABLET | Refills: 3 | Status: SHIPPED | OUTPATIENT
Start: 2023-01-03

## 2023-01-03 NOTE — PROGRESS NOTES
Results for orders placed or performed in visit on 12/30/22   Cardiac EP device report    Narrative    MDT-DUAL CHAMBER PPM (AAIR-DDDR MODE)/ ACTIVE SYSTEM IS MRI CONDITIONAL  CARELINK TRANSMISSION:  BATTERY VOLTAGE ADEQUATE (12 2YRS)  AP 73%  <1%  ALL LEAD PARAMETERS WITHIN NORMAL LIMITS  NO SIGNIFICANT HIGH RATE EPISODES  NORMAL DEVICE FUNCTION  ---GRACE

## 2023-01-04 ENCOUNTER — TELEPHONE (OUTPATIENT)
Dept: CARDIOLOGY CLINIC | Facility: CLINIC | Age: 78
End: 2023-01-04

## 2023-01-04 NOTE — TELEPHONE ENCOUNTER
Faith New reporting more frequent episode of afib the past few weeks especially yesterday  I asked her to send over a transmission now and will f/u when received

## 2023-01-05 NOTE — TELEPHONE ENCOUNTER
Pt stated she feels a fluttering/palpitation more so when she sits and relaxes, but its ocassional and doesn't last long  She will continue to monitor and call back if becomes worse

## 2023-01-06 ENCOUNTER — TELEPHONE (OUTPATIENT)
Dept: CARDIOLOGY CLINIC | Facility: CLINIC | Age: 78
End: 2023-01-06

## 2023-01-06 NOTE — TELEPHONE ENCOUNTER
Called and spoke to pt no samples gucci, asked if could check at [facility]  Advised would reach out  Let her know  Reached out to [employer] via teams and spoke to Louie Ardon they have samples of Eliquis 5 mg bid, advised will notify pt  Pt aware

## 2023-01-13 ENCOUNTER — OFFICE VISIT (OUTPATIENT)
Dept: OBGYN CLINIC | Facility: MEDICAL CENTER | Age: 78
End: 2023-01-13

## 2023-01-13 VITALS
DIASTOLIC BLOOD PRESSURE: 76 MMHG | HEIGHT: 62 IN | SYSTOLIC BLOOD PRESSURE: 145 MMHG | BODY MASS INDEX: 29.44 KG/M2 | HEART RATE: 66 BPM | WEIGHT: 160 LBS

## 2023-01-13 DIAGNOSIS — G89.29 CHRONIC PAIN OF LEFT KNEE: ICD-10-CM

## 2023-01-13 DIAGNOSIS — M70.52 PES ANSERINUS BURSITIS OF LEFT KNEE: ICD-10-CM

## 2023-01-13 DIAGNOSIS — M25.562 CHRONIC PAIN OF LEFT KNEE: ICD-10-CM

## 2023-01-13 DIAGNOSIS — Z96.652 HISTORY OF TOTAL KNEE REPLACEMENT, LEFT: Primary | ICD-10-CM

## 2023-01-13 DIAGNOSIS — M17.11 ARTHRITIS OF RIGHT KNEE: ICD-10-CM

## 2023-01-13 RX ORDER — BETAMETHASONE SODIUM PHOSPHATE AND BETAMETHASONE ACETATE 3; 3 MG/ML; MG/ML
12 INJECTION, SUSPENSION INTRA-ARTICULAR; INTRALESIONAL; INTRAMUSCULAR; SOFT TISSUE
Status: COMPLETED | OUTPATIENT
Start: 2023-01-13 | End: 2023-01-13

## 2023-01-13 RX ORDER — BUPIVACAINE HYDROCHLORIDE 2.5 MG/ML
2 INJECTION, SOLUTION INFILTRATION; PERINEURAL
Status: COMPLETED | OUTPATIENT
Start: 2023-01-13 | End: 2023-01-13

## 2023-01-13 RX ORDER — LIDOCAINE HYDROCHLORIDE 10 MG/ML
2 INJECTION, SOLUTION INFILTRATION; PERINEURAL
Status: COMPLETED | OUTPATIENT
Start: 2023-01-13 | End: 2023-01-13

## 2023-01-13 RX ADMIN — BETAMETHASONE SODIUM PHOSPHATE AND BETAMETHASONE ACETATE 12 MG: 3; 3 INJECTION, SUSPENSION INTRA-ARTICULAR; INTRALESIONAL; INTRAMUSCULAR; SOFT TISSUE at 15:30

## 2023-01-13 RX ADMIN — LIDOCAINE HYDROCHLORIDE 2 ML: 10 INJECTION, SOLUTION INFILTRATION; PERINEURAL at 15:30

## 2023-01-13 RX ADMIN — BUPIVACAINE HYDROCHLORIDE 2 ML: 2.5 INJECTION, SOLUTION INFILTRATION; PERINEURAL at 15:30

## 2023-01-13 NOTE — PROGRESS NOTES
Subjective; 54-year-old female with a history of successful left total knee replacement, done many years ago  Reports pain from her left knee, medial, anteriorly,which is sporadically increased by weightbearing  Her left knee is far more painful than her arthritic right knee  Her right knee surgery was canceled her in 2022, secondary to cardiac reasons  She is accompanied by a younger female for today's visit      The most recent x-rays of the left knee are from March 2022    Past Medical History:   Diagnosis Date   • Actinic keratosis     last assessed - [id number]   • Arthritis    • Atrial fibrillation with rapid ventricular response ([address] )     last assessed - 26Apr2016   • Basal cell carcinoma    • Benign colon polyp     last assessed - 27Apr2015   • CHF (congestive heart failure) ([address] ) 06/2022   • Disease of thyroid gland    • Effusion of knee joint right     Resolved - [id number]   • Esophageal reflux    • Fibromyalgia     last assessed - [id number]   • Fibromyalgia, primary    • GERD (gastroesophageal reflux disease)    • Hypertension    • Palpitations     last assessed - 30Apr2013   • Peroneal tendonitis, right     last assessed - [id number]   • Right lumbar radiculopathy 03/17/2016   • Thyroid cancer ([facility] )    • Thyroid nodule    • Trochanteric bursitis of left hip 03/09/2018       Past Surgical History:   Procedure Laterality Date   • CARDIAC ELECTROPHYSIOLOGY STUDY AND ABLATION  08/2022   • CATARACT EXTRACTION Bilateral    • COLONOSCOPY  03/2018   • COLONOSCOPY W/ POLYPECTOMY  2021    repeat in 5 years   • EYE SURGERY     • HYSTERECTOMY     • JOINT REPLACEMENT Left     knee   • VT NEUROPLASTY &/TRANSPOS MEDIAN NRV CARPAL TUNNE Right 11/14/2019    Procedure: RELEASE CARPAL TUNNEL;  Surgeon: Katelyn Jo MD;  [facility];  Service: Orthopedics   • VT TOTAL THYROID LOBECTOMY UNI W/WO ISTHMUSECTOMY Left 12/16/2020    Procedure: Left THYROID LOBECTOMY;  Surgeon: Carmelo Torres MD;  [facility];  Service: ENT   • RECTAL POLYPECTOMY     • REPLACEMENT TOTAL KNEE Left     last assessed - [id number]   • TONSILLECTOMY     • TOTAL ABDOMINAL HYSTERECTOMY     • TUBAL LIGATION     • US GUIDED THYROID BIOPSY  10/14/2020       Family History   Problem Relation Age of Onset   • Heart disease Mother    • Diabetes Mother    • Heart disease Father    • Coronary artery disease Father    • Stroke Father         cerebrovascular accident   • Heart attack Father         myocardial infarction   • Sudden death Father         scd   • Other Family         Back disorder   • Coronary artery disease Family    • Neuropathy Family    • Osteoporosis Family    • No Known Problems Daughter    • No Known Problems Maternal Grandmother    • No Known Problems Maternal Grandfather    • No Known Problems Paternal Grandmother    • No Known Problems Paternal Grandfather    • Cancer Maternal Uncle    • Breast cancer Maternal Aunt 72   • No Known Problems Son    • No Known Problems Maternal Aunt    • No Known Problems Maternal Aunt    • No Known Problems Maternal Aunt    • No Known Problems Paternal Aunt    • No Known Problems Paternal Aunt    • Anuerysm Neg Hx    • Clotting disorder Neg Hx    • Arrhythmia Neg Hx    • Heart failure Neg Hx        Social History     Tobacco Use   • Smoking status: Never   • Smokeless tobacco: Never   Vaping Use   • Vaping Use: Never used   Substance Use Topics   • Alcohol use: Not Currently   • Drug use: Never     Exam;    Adult female in no acute distress  She can extend her left knee fully  She has exquisite or severe pain medial compartment of the knee which also has is her anterior and anterolateral knee pain  Extension is full, flexion is done with pain to 90 degrees  She has reproducible pain to varus and valgus stress or loading of the knee joint with the knee flexed 30 to 45 degrees  Has no anterior posterior play of her left knee the knee flexed 90 degrees      Previous x-rays left knee; show total knee components or implants, question is the interface between the implant and the proximal tibia by the AP appearance  Large joint arthrocentesis: R knee  Universal Protocol:  Consent given by: patient    Supporting Documentation  Indications: pain   Procedure Details  Location: knee - R knee  Needle size: 22 G  Ultrasound guidance: no  Medications administered: 2 mL bupivacaine 0 25 %; 2 mL lidocaine 1 %; 12 mg betamethasone acetate-betamethasone sodium phosphate 6 (3-3) mg/mL      Large joint arthrocentesis: L knee  Universal Protocol:  Consent given by: patient    Supporting Documentation  Indications: pain   Procedure Details  Location: knee - L knee ([facility]urs[facility])  Needle size: 22 G  Medications administered: 2 mL bupivacaine 0 25 %; 2 mL lidocaine 1 %; 12 mg betamethasone acetate-betamethasone sodium phosphate 6 (3-3) mg/mL              Impression;  L Knee pain    History of left total knee replacement  Left Knee pes anserine bursitis    Right knee osteoarthritis    Right knee pain        Plan;    Received injections to the left pes anserine bursa, and the right knee intra-articular knee  We will see her in follow-up in 3 additional months  Follow-up or x-rays of the left knee can be done at that time  Additional diagnostic imaging was required at the current time, or indicated      Exam was provided by and plan formulated by the attending surgeon it was my privilege to assist him in the delivery of her care

## 2023-01-17 ENCOUNTER — EVALUATION (OUTPATIENT)
Dept: PHYSICAL THERAPY | Facility: MEDICAL CENTER | Age: 78
End: 2023-01-17

## 2023-01-17 DIAGNOSIS — M25.562 CHRONIC PAIN OF LEFT KNEE: Primary | ICD-10-CM

## 2023-01-17 DIAGNOSIS — G89.29 CHRONIC PAIN OF LEFT KNEE: Primary | ICD-10-CM

## 2023-01-17 NOTE — PROGRESS NOTES
PT Evaluation     Today's date: 2023  Patient name: Georgette Vega  : 1945  MRN: [id number]  Referring provider: Tru Carney  Dx:   Encounter Diagnosis     ICD-10-CM    1  Chronic pain of left knee  M25 562     G89 29           Start Time: 1015  Stop Time: 1045  Total time in clinic (min): 30 minutes    Assessment  Assessment details: Cheryln Apgar is a 78y/o female who presents to OP PT with a a referral from Phan Ramey PA-C with a medical diagnosis of pain of left knee  Upon examination pt presents with decreased and painful left knee ROM, decreased LE strength, decreased functional mobility, decreased muscular endurance, poor balance and increased pain  Previously mentioned deficits have impaired patients ability to perform ADLs, recreational activities and house hold duties  Pt was educated on examination findings, diagnosis, prognosis, home exercise program to complete  Pt states understanding and consents to skilled PT services  Pt is a good candidate for skilled PT services  Positive prognositic indicators include desire to improve and active lifestyle  Negative prognostic indicators include chronicity of pain, BMI, CKD, history of falls, HTN, Afib, CHF, neuropathy, DM II, Osteopenia,   Pt would benefit from skilled PT services to address functional deficits to return to PLOF  Impairments: abnormal gait, abnormal muscle firing, abnormal or restricted ROM, abnormal movement, activity intolerance, impaired physical strength, lacks appropriate home exercise program, pain with function and weight-bearing intolerance  Understanding of Dx/Px/POC: good   Prognosis: good    Goals  STG 2 to 6 weeks    1  Patient will report a 2-3 point decrease on NPRS for improved tolerance to ADLs, recreational activities and work duties  2   Pt will be Independent with basic HEP      3    Pt will improve mmt by at least 1/3 muscle grade in all deficient planes         LTG 6 weeks to discharge    1     Patient will demonstrate 0 degrees of knee extension for improved gait mechanics  2    Patient will demonstrate at least 110 degrees of knee flexion to safely negotiate stairs  3  Patient will have 5/5 MMT in all knee motions, for improved community ambulation and performance of ADLs and recreational activity  4  Patient will return to PLOF in all work duties, ADLs, recreational activites and house hold chores  Plan  Patient would benefit from: skilled physical therapy and PT eval  Referral necessary: No  Planned modality interventions: cryotherapy, TENS, thermotherapy: hydrocollator packs, high voltage pulsed current: pain management and high voltage pulsed current: spasm management  Planned therapy interventions: joint mobilization, manual therapy, massage, ADL training, balance, neuromuscular re-education, patient education, strengthening, stretching, therapeutic activities, therapeutic exercise, flexibility, functional ROM exercises, gait training, graded exercise, home exercise program, abdominal trunk stabilization, activity modification, Khoury taping, orthotic fitting/training, orthotic management and training, graded activity and graded motor  Frequency: 2x week  Plan of Care beginning date: 1/17/2023  Plan of Care expiration date: 4/11/2023  Treatment plan discussed with: patient        Subjective Evaluation    History of Present Illness  Mechanism of injury: Subjective: Patient states a chronic history of left knee pain  Pt had a left TKA performed 15 years ago  She states a complicated post operative erpative course, in which she under went 2 MUAs  Patient states over the past 12 months, she has experienced an increase in pain and decrease in function  She notes difficulty ascending and descending stairs, pain when standing for a long period of time       Pain  Type: soreness  Current: 2  Best:2  Worst: 8  Alleviates: heat  Aggravates: walking, going up stairs    Occupation: retired    Imaging:  FINDINGS:     There is no acute fracture or dislocation      Small knee effusion seen     Knee arthroplasty seen  There is no finding of loosening or fracture  Patellar resurfacing noted     No lytic or blastic osseous lesion      Soft tissues are unremarkable        Impression; Knee arthroplasty with stable alignmen    PMH: Afib, CHF, HTN, Osteopenia, CKD, CKD, DM II    Previous Surgeries: L Knee TKA , see file for other operative history    Previous Physical Activity: daily walks    Current Medications: denies    NATE: insidious    Surgery Date: n/a     MD: Dr Kellee Calvert    Patient Goals: be in less pain, go up and down the stairs without pain               Objective     Palpation   Left   Tenderness of the distal biceps femoris, distal semimembranosus, distal semitendinosus, lateral gastrocnemius, medial gastrocnemius and vastus medialis  Tenderness   Left Knee   Tenderness in the inferior patella, ITB, lateral retinaculum and patellar tendon       Active Range of Motion   Left Knee   Flexion: 83 degrees   Extension: 8 degrees     Right Knee   Flexion: 106 degrees   Extension: 0 degrees   Left Ankle/Foot   Dorsiflexion (kf): 8 degrees     Right Ankle/Foot   Dorsiflexion (kf): 15 degrees     Passive Range of Motion   Left Knee   Flexion: 84 degrees   Extension: 6 degrees     Right Knee   Flexion: 110 degrees   Extension: 0 degrees     Strength/Myotome Testing     Left Hip   Planes of Motion   Flexion: 3  Extension: 3  Abduction: 3    Right Hip   Planes of Motion   Flexion: 3+  Extension: 3+  Abduction: 3+    Left Knee   Flexion: 3-  Extension: 3  Quadriceps contraction: fair    Right Knee   Flexion: 3+  Extension: 3+             Precautions: DM II, HTN, ostepenia      Manuals 1/17/2023              PROM             STM                                       Neuro Re-Ed             QS w/ calf stretch             SAQ             LAQ             Clajenny Ther Ex             Supine HSS             IT Band Stretch             Quad Stretch             LP                                                                 Ther Activity                                       Gait Training                                       Modalities

## 2023-01-24 ENCOUNTER — OFFICE VISIT (OUTPATIENT)
Dept: PHYSICAL THERAPY | Facility: MEDICAL CENTER | Age: 78
End: 2023-01-24

## 2023-01-24 DIAGNOSIS — G89.29 CHRONIC PAIN OF LEFT KNEE: Primary | ICD-10-CM

## 2023-01-24 DIAGNOSIS — M25.562 CHRONIC PAIN OF LEFT KNEE: Primary | ICD-10-CM

## 2023-01-24 NOTE — PROGRESS NOTES
Daily Note     Today's date: 2023  Patient name: Nagi Quiros  : 1945  MRN: [id number]  Referring provider: Romeo Howard  Dx:   Encounter Diagnosis     ICD-10-CM    1  Chronic pain of left knee  M25 562     G89 29           Start Time: 1146  Stop Time: 1233  Total time in clinic (min): 47 minutes    Subjective: Pt states she felt she needed to use her cane today because of her leg giving way  Objective: See treatment diary below      Assessment: Tolerated treatment well  Patient experiences general discomfort throughout session however does not limit patient  Moderate guarding with with PROM, greater with ext than flexion  Patient demonstrated fatigue post treatment and would benefit from continued PT      Plan: Continue per plan of care  Progress treatment as tolerated         Precautions: DM II, HTN, ostepenia      Manuals 2023             PROM  CC           STM                                       Neuro Re-Ed             QS w/ calf stretch  5" 2x10           SAQ  3" 2x10           LAQ             Standing Hip Abd  2x10           QS SLR  4x5                                     Ther Ex             Supine HSS  30" 5x           IT Band Stretch  30" 5x           Quad Stretch  30" 5x           LP             Bike  5'                                                  Ther Activity                                       Gait Training                                       Modalities

## 2023-01-27 ENCOUNTER — OFFICE VISIT (OUTPATIENT)
Dept: PHYSICAL THERAPY | Facility: MEDICAL CENTER | Age: 78
End: 2023-01-27

## 2023-01-27 DIAGNOSIS — G89.29 CHRONIC PAIN OF LEFT KNEE: Primary | ICD-10-CM

## 2023-01-27 DIAGNOSIS — M25.562 CHRONIC PAIN OF LEFT KNEE: Primary | ICD-10-CM

## 2023-01-27 NOTE — PROGRESS NOTES
Daily Note     Today's date: 2023  Patient name: Candelario Essex  : 1945  MRN: [id number]  Referring provider: Mikey Grayson  Dx:   Encounter Diagnosis     ICD-10-CM    1  Chronic pain of left knee  M25 562     G89 29           Start Time: 1014  Stop Time: 1052  Total time in clinic (min): 38 minutes    Subjective: Pt states she feels an improvement already in her knee symptoms  She states she was able to stand much longer and do more after previous session      Objective: See treatment diary below      Assessment: Tolerated treatment well  Patient requires tactile cues for improved quadriceps contraction, demonstrates approx 5 degree quad lag with SLR  Patient demonstrated fatigue post treatment and would benefit from continued PT      Plan: Continue per plan of care  Progress treatment as tolerated         Precautions: DM II, HTN, ostepenia      Manuals 2023            PROM  CC CC          STM                                       Neuro Re-Ed             QS w/ calf stretch  5" 2x10 5" 2x10          SAQ  3" 2x10 3" 2x10 3#          LAQ   3" 2x10 3#          Standing Hip Abd  2x10 2x10 3#          QS SLR  4x5 4x5                                    Ther Ex             Supine HSS  30" 5x 30" 5x          IT Band Stretch  30" 5x 30" 5x          Quad Stretch  30" 5x 30" 5x          LP             Bike  5' 5'                                                 Ther Activity                                       Gait Training                                       Modalities

## 2023-01-31 ENCOUNTER — OFFICE VISIT (OUTPATIENT)
Dept: PHYSICAL THERAPY | Facility: MEDICAL CENTER | Age: 78
End: 2023-01-31

## 2023-01-31 ENCOUNTER — APPOINTMENT (OUTPATIENT)
Dept: LAB | Facility: MEDICAL CENTER | Age: 78
End: 2023-01-31

## 2023-01-31 DIAGNOSIS — M25.562 CHRONIC PAIN OF LEFT KNEE: Primary | ICD-10-CM

## 2023-01-31 DIAGNOSIS — G89.29 CHRONIC PAIN OF LEFT KNEE: Primary | ICD-10-CM

## 2023-01-31 DIAGNOSIS — I48.0 PAROXYSMAL ATRIAL FIBRILLATION (HCC): ICD-10-CM

## 2023-01-31 DIAGNOSIS — D47.2 MGUS (MONOCLONAL GAMMOPATHY OF UNKNOWN SIGNIFICANCE): ICD-10-CM

## 2023-01-31 LAB
ALBUMIN SERPL BCP-MCNC: 3.7 G/DL (ref 3.5–5)
ALP SERPL-CCNC: 99 U/L (ref 46–116)
ALT SERPL W P-5'-P-CCNC: 23 U/L (ref 12–78)
ANION GAP SERPL CALCULATED.3IONS-SCNC: 6 MMOL/L (ref 4–13)
AST SERPL W P-5'-P-CCNC: 19 U/L (ref 5–45)
BASOPHILS # BLD AUTO: 0.06 THOUSANDS/ÂΜL (ref 0–0.1)
BASOPHILS NFR BLD AUTO: 1 % (ref 0–1)
BILIRUB SERPL-MCNC: 1.02 MG/DL (ref 0.2–1)
BUN SERPL-MCNC: 23 MG/DL (ref 5–25)
CALCIUM SERPL-MCNC: 9.7 MG/DL (ref 8.3–10.1)
CHLORIDE SERPL-SCNC: 98 MMOL/L (ref 96–108)
CO2 SERPL-SCNC: 28 MMOL/L (ref 21–32)
CREAT SERPL-MCNC: 1.01 MG/DL (ref 0.6–1.3)
CREAT UR-MCNC: 72.8 MG/DL
DIGOXIN SERPL-MCNC: <0.2 NG/ML (ref 0.8–2)
EOSINOPHIL # BLD AUTO: 0.17 THOUSAND/ÂΜL (ref 0–0.61)
EOSINOPHIL NFR BLD AUTO: 3 % (ref 0–6)
ERYTHROCYTE [DISTWIDTH] IN BLOOD BY AUTOMATED COUNT: 13.2 % (ref 11.6–15.1)
GFR SERPL CREATININE-BSD FRML MDRD: 53 ML/MIN/1.73SQ M
GLUCOSE P FAST SERPL-MCNC: 112 MG/DL (ref 65–99)
HCT VFR BLD AUTO: 41 % (ref 34.8–46.1)
HGB BLD-MCNC: 13.4 G/DL (ref 11.5–15.4)
IGA SERPL-MCNC: 539 MG/DL (ref 70–400)
IGG SERPL-MCNC: 905 MG/DL (ref 700–1600)
IGM SERPL-MCNC: 69 MG/DL (ref 40–230)
IMM GRANULOCYTES # BLD AUTO: 0.01 THOUSAND/UL (ref 0–0.2)
IMM GRANULOCYTES NFR BLD AUTO: 0 % (ref 0–2)
LYMPHOCYTES # BLD AUTO: 1.22 THOUSANDS/ÂΜL (ref 0.6–4.47)
LYMPHOCYTES NFR BLD AUTO: 24 % (ref 14–44)
MCH RBC QN AUTO: 30.9 PG (ref 26.8–34.3)
MCHC RBC AUTO-ENTMCNC: 32.7 G/DL (ref 31.4–37.4)
MCV RBC AUTO: 95 FL (ref 82–98)
MICROALBUMIN UR-MCNC: 9.2 MG/L (ref 0–20)
MICROALBUMIN/CREAT 24H UR: 13 MG/G CREATININE (ref 0–30)
MONOCYTES # BLD AUTO: 0.54 THOUSAND/ÂΜL (ref 0.17–1.22)
MONOCYTES NFR BLD AUTO: 11 % (ref 4–12)
NEUTROPHILS # BLD AUTO: 3.15 THOUSANDS/ÂΜL (ref 1.85–7.62)
NEUTS SEG NFR BLD AUTO: 61 % (ref 43–75)
NRBC BLD AUTO-RTO: 0 /100 WBCS
PLATELET # BLD AUTO: 278 THOUSANDS/UL (ref 149–390)
PMV BLD AUTO: 9.8 FL (ref 8.9–12.7)
POTASSIUM SERPL-SCNC: 4.4 MMOL/L (ref 3.5–5.3)
PROT SERPL-MCNC: 7.7 G/DL (ref 6.4–8.4)
RBC # BLD AUTO: 4.34 MILLION/UL (ref 3.81–5.12)
SODIUM SERPL-SCNC: 132 MMOL/L (ref 135–147)
TSH SERPL DL<=0.05 MIU/L-ACNC: 1.21 UIU/ML (ref 0.45–4.5)
WBC # BLD AUTO: 5.15 THOUSAND/UL (ref 4.31–10.16)

## 2023-02-01 LAB
ALBUMIN SERPL ELPH-MCNC: 3.92 G/DL (ref 3.5–5)
ALBUMIN SERPL ELPH-MCNC: 54.5 % (ref 52–65)
ALPHA1 GLOB SERPL ELPH-MCNC: 0.32 G/DL (ref 0.1–0.4)
ALPHA1 GLOB SERPL ELPH-MCNC: 4.5 % (ref 2.5–5)
ALPHA2 GLOB SERPL ELPH-MCNC: 0.89 G/DL (ref 0.4–1.2)
ALPHA2 GLOB SERPL ELPH-MCNC: 12.4 % (ref 7–13)
BETA GLOB ABNORMAL SERPL ELPH-MCNC: 0.51 G/DL (ref 0.4–0.8)
BETA1 GLOB SERPL ELPH-MCNC: 7.1 % (ref 5–13)
BETA2 GLOB SERPL ELPH-MCNC: 7.6 % (ref 2–8)
BETA2+GAMMA GLOB SERPL ELPH-MCNC: 0.55 G/DL (ref 0.2–0.5)
EST. AVERAGE GLUCOSE BLD GHB EST-MCNC: 140 MG/DL
GAMMA GLOB ABNORMAL SERPL ELPH-MCNC: 1 G/DL (ref 0.5–1.6)
GAMMA GLOB SERPL ELPH-MCNC: 13.9 % (ref 12–22)
HBA1C MFR BLD: 6.5 %
IGG/ALB SER: 1.2 {RATIO} (ref 1.1–1.8)
KAPPA LC FREE SER-MCNC: 30.8 MG/L (ref 3.3–19.4)
KAPPA LC FREE/LAMBDA FREE SER: 1.39 {RATIO} (ref 0.26–1.65)
LAMBDA LC FREE SERPL-MCNC: 22.1 MG/L (ref 5.7–26.3)
M PROTEIN 1 MFR SERPL ELPH: 2.8 %
M PROTEIN 1 SERPL ELPH-MCNC: 0.2 G/DL
PROT PATTERN SERPL ELPH-IMP: ABNORMAL
PROT SERPL-MCNC: 7.2 G/DL (ref 6.4–8.2)

## 2023-02-02 ENCOUNTER — OFFICE VISIT (OUTPATIENT)
Dept: CARDIOLOGY CLINIC | Facility: CLINIC | Age: 78
End: 2023-02-02

## 2023-02-02 VITALS
WEIGHT: 160.8 LBS | BODY MASS INDEX: 29.59 KG/M2 | DIASTOLIC BLOOD PRESSURE: 78 MMHG | SYSTOLIC BLOOD PRESSURE: 138 MMHG | HEART RATE: 64 BPM | HEIGHT: 62 IN

## 2023-02-02 DIAGNOSIS — I50.32 CHRONIC DIASTOLIC CHF (CONGESTIVE HEART FAILURE) (HCC): ICD-10-CM

## 2023-02-02 DIAGNOSIS — Z79.01 CURRENT USE OF LONG TERM ANTICOAGULATION: ICD-10-CM

## 2023-02-02 DIAGNOSIS — Z95.0 S/P PLACEMENT OF CARDIAC PACEMAKER: ICD-10-CM

## 2023-02-02 DIAGNOSIS — I27.20 PULMONARY HYPERTENSION (HCC): ICD-10-CM

## 2023-02-02 DIAGNOSIS — E78.2 MIXED HYPERLIPIDEMIA: ICD-10-CM

## 2023-02-02 DIAGNOSIS — I48.0 PAROXYSMAL ATRIAL FIBRILLATION (HCC): Primary | ICD-10-CM

## 2023-02-02 DIAGNOSIS — I10 ESSENTIAL HYPERTENSION: ICD-10-CM

## 2023-02-02 NOTE — PROGRESS NOTES
Cardiology Follow Up    [place] Core  1945  [id number]  [address]    1  Paroxysmal atrial fibrillation (HCC)  POCT ECG    VAS screening    Echo complete w/ contrast if indicated      2  Essential hypertension  VAS screening    Echo complete w/ contrast if indicated      3  Chronic diastolic CHF (congestive heart failure) ([facility] )        4  Pulmonary hypertension ([facility] )        5  Mixed hyperlipidemia        6  S/P placement of cardiac pacemaker        7  Current use of long term anticoagulation  VAS screening    Echo complete w/ contrast if indicated          Discussion/Summary: Overall she has been doing well from a cardiac standpoint  She is maintaining sinus rhythm and tolerating anticoagulation pacemaker is 40% atrial nothing in the ventricle  No A  fib  Blood pressure on manual recheck was 140/78  Home checks have been well controlled continue current regimen  Lipids are stable I ordered a vascular screening and she is due for an echo this summer  Interval History:  66 yo pleasant female with  paroxysmal atrial fibrillation, hypertension, hyperlipidemia presents for a follow-up visit  She continues to remain in sinus rhythm on sotalol  Overall she has been doing well still has some shortness of breath and is back into atrial fibrillation  She has been switched to amiodarone but had breakthrough again  Pacemaker has been protecting her from the slow events  This is likely what led to her VF during the 1st ablation  Rates have been stable  Atrial fibrillation has been present for the last few days  Overall she has been doing well  Denies any symptoms  There is been no chest pain, shortness of breath, palpitations, lightheadedness, dizziness, or syncope  There has been no lower extremity edema, PND, orthopnea  She is taking all medications as prescribed  Tolerating Eliquis    Problem List Patient returning call. Please call back    Generalized edema    Essential hypertension    Dyslipidemia    Sacroiliitis (HCC)    Acute pain of right knee    Allergic rhinitis    Fibromyalgia    Hyperlipidemia    Insomnia    Lumbar spondylosis    Lumbar stenosis    Osteoarthrosis, hand    Osteoarthritis of knee    Overview Signed 10/4/2018  5:41 PM by Sneha Guerra MD     Laterality: Right         Osteopenia    Paroxysmal atrial fibrillation (HCC)    Peripheral neuropathy    Restless legs syndrome    Right lumbar radiculopathy    TMJ syndrome    Vitamin D deficiency    Effusion of right knee        Past Medical History:   Diagnosis Date   • Actinic keratosis     last assessed - [id number]   • Arthritis    • Atrial fibrillation with rapid ventricular response ([facility] )     last assessed - 26Apr2016   • Basal cell carcinoma    • Benign colon polyp     last assessed - 27Apr2015   • CHF (congestive heart failure) ([facility] ) 06/2022   • Disease of thyroid gland    • Effusion of knee joint right     Resolved - [id number]   • Esophageal reflux    • Fibromyalgia     last assessed - [id number]   • Fibromyalgia, primary    • GERD (gastroesophageal reflux disease)    • Hypertension    • Palpitations     last assessed - 30Apr2013   • Peroneal tendonitis, right     last assessed - [id number]   • Right lumbar radiculopathy 03/17/2016   • Thyroid cancer ([facility] )    • Thyroid nodule    • Trochanteric bursitis of left hip 03/09/2018     Social History     Socioeconomic History   • Marital status:       Spouse name: Not on file   • Number of children: Not on file   • Years of education: Not on file   • Highest education level: Not on file   Occupational History   • Not on file   Tobacco Use   • Smoking status: Never   • Smokeless tobacco: Never   Vaping Use   • Vaping Use: Never used   Substance and Sexual Activity   • Alcohol use: Not Currently   • Drug use: Never   • Sexual activity: Not Currently   Other Topics Concern   • Not on file   Social History Narrative   • Not on file Social Determinants of Health     Financial Resource Strain: Low Risk    • Difficulty of Paying Living Expenses: Not hard at all   Food Insecurity: Not on file   Transportation Needs: No Transportation Needs   • Lack of Transportation (Medical): No   • Lack of Transportation (Non-Medical):  No   Physical Activity: Not on file   Stress: Not on file   Social Connections: Not on file   Intimate Partner Violence: Not on file   Housing Stability: Not on file      Family History   Problem Relation Age of Onset   • Heart disease Mother    • Diabetes Mother    • Heart disease Father    • Coronary artery disease Father    • Stroke Father         cerebrovascular accident   • Heart attack Father         myocardial infarction   • Sudden death Father         scd   • Other Family         Back disorder   • Coronary artery disease Family    • Neuropathy Family    • Osteoporosis Family    • No Known Problems Daughter    • No Known Problems Maternal Grandmother    • No Known Problems Maternal Grandfather    • No Known Problems Paternal Grandmother    • No Known Problems Paternal Grandfather    • Cancer Maternal Uncle    • Breast cancer Maternal Aunt 72   • No Known Problems Son    • No Known Problems Maternal Aunt    • No Known Problems Maternal Aunt    • No Known Problems Maternal Aunt    • No Known Problems Paternal Aunt    • No Known Problems Paternal Aunt    • Anuerysm Neg Hx    • Clotting disorder Neg Hx    • Arrhythmia Neg Hx    • Heart failure Neg Hx      Past Surgical History:   Procedure Laterality Date   • CARDIAC ELECTROPHYSIOLOGY STUDY AND ABLATION  08/2022   • CATARACT EXTRACTION Bilateral    • COLONOSCOPY  03/2018   • COLONOSCOPY W/ POLYPECTOMY  2021    repeat in 5 years   • EYE SURGERY     • HYSTERECTOMY     • JOINT REPLACEMENT Left     knee   • TX NEUROPLASTY &/TRANSPOS MEDIAN NRV CARPAL TUNNE Right 11/14/2019    Procedure: RELEASE CARPAL TUNNEL;  Surgeon: Smooth Sharpe MD;  Location: [facility];  Service: Orthopedics   • CA TOTAL THYROID LOBECTOMY UNI W/WO ISTHMUSECTOMY Left 12/16/2020    Procedure: Left THYROID LOBECTOMY;  Surgeon: Lynda Mcgrath MD;  Location: [facility];  Service: ENT   • RECTAL POLYPECTOMY     • REPLACEMENT TOTAL KNEE Left     last assessed - 27Apr2015   • TONSILLECTOMY     • TOTAL ABDOMINAL HYSTERECTOMY     • TUBAL LIGATION     • US GUIDED THYROID BIOPSY  10/14/2020       Current Outpatient Medications:   •  amLODIPine (NORVASC) 5 mg tablet, Take 1 tablet (5 mg total) by mouth daily, Disp: 90 tablet, Rfl: 3  •  apixaban (ELIQUIS) 5 mg, Take 1 tablet (5 mg total) by mouth 2 (two) times a day, Disp: 48 tablet, Rfl: 0  •  ascorbic acid (VITAMIN C) 500 mg tablet, Take 500 mg by mouth daily , Disp: , Rfl:   •  Cholecalciferol 50 MCG (2000 UT) CAPS, Take 2,000 Units by mouth 2 (two) times a day  , Disp: , Rfl:   •  Chromium Picolinate (CHROMIUM PICOLATE PO), Take 1 tablet by mouth daily, Disp: , Rfl:   •  DULoxetine (CYMBALTA) 30 mg delayed release capsule, take 1 capsule by mouth once daily AFTER BREAKFAST, Disp: 30 capsule, Rfl: 2  •  ezetimibe (ZETIA) 10 mg tablet, Take 1 tablet (10 mg total) by mouth daily, Disp: 90 tablet, Rfl: 3  •  famotidine (PEPCID) 20 mg tablet, Take 20 mg by mouth every other day  , Disp: , Rfl:   •  furosemide (LASIX) 20 mg tablet, take 1 tablet by mouth daily if needed for shortness of breath WEIGHT GAIN 3 LBS IN 1 DAY OR LOWER LEG SWELLING, Disp: 10 tablet, Rfl: 3  •  gabapentin (NEURONTIN) 300 mg capsule, Take 1 capsule (300 mg total) by mouth daily at bedtime, Disp: 30 capsule, Rfl: 5  •  ipratropium (ATROVENT) 0 03 % nasal spray, 2 sprays into each nostril 3 (three) times a day Begin once per day, then progress to twice per day  Progress to three times a day as tolerated  , Disp: 90 mL, Rfl: 3  •  lidocaine (XYLOCAINE) 5 % ointment, Apply topically as needed for mild pain, Disp: 35 44 g, Rfl: 0  •  losartan (COZAAR) 50 mg tablet, Take 1 tablet (50 mg total) by mouth 2 (two) times a day, Disp: 180 tablet, Rfl: 3  •  metoprolol succinate (TOPROL-XL) 25 mg 24 hr tablet, Take 3 tablets (75 mg total) by mouth every 12 (twelve) hours, Disp: 540 tablet, Rfl: 3  •  pantoprazole (PROTONIX) 20 mg tablet, Take 1 tablet by mouth daily before breakfast, Disp: , Rfl:   •  rOPINIRole (REQUIP) 0 5 mg tablet, Take 1 tablet (0 5 mg total) by mouth daily at bedtime, Disp: 90 tablet, Rfl: 3  •  traMADol (ULTRAM) 50 mg tablet, Take 1 tablet (50 mg total) by mouth 2 (two) times a day as needed for moderate pain, Disp: 60 tablet, Rfl: 3  •  TURMERIC PO, Take 1 capsule by mouth 2 (two) times a day, Disp: , Rfl:   •  zolpidem (AMBIEN) 10 mg tablet, Take 1 tablet (10 mg total) by mouth daily at bedtime as needed for sleep, Disp: 90 tablet, Rfl: 1  Allergies   Allergen Reactions   • Sotalol Other (See Comments)     Prolonged QTc leading to torsades de pointes    • Penicillins Other (See Comments)     As a child calcium deposit in the arm    • Ace Inhibitors GI Intolerance     Did feel well on it   • Omeprazole Abdominal Pain, Rash and Vomiting     stomach pain, vomiting, rash       Labs:     Chemistry        Component Value Date/Time     05/06/2015 1116    K 4 4 01/31/2023 0851    K 4 8 05/06/2015 1116    CL 98 01/31/2023 0851    CL 99 (L) 05/06/2015 1116    CO2 28 01/31/2023 0851    CO2 28 05/06/2015 1116    BUN 23 01/31/2023 0851    BUN 19 05/06/2015 1116    CREATININE 1 01 01/31/2023 0851    CREATININE 0 97 05/06/2015 1116        Component Value Date/Time    CALCIUM 9 7 01/31/2023 0851    CALCIUM 9 0 05/06/2015 1116    ALKPHOS 99 01/31/2023 0851    ALKPHOS 60 05/06/2015 1116    AST 19 01/31/2023 0851    AST 28 05/06/2015 1116    ALT 23 01/31/2023 0851    ALT 34 05/06/2015 1116    BILITOT 0 78 05/06/2015 1116            Lab Results   Component Value Date    CHOL 205 05/06/2015    CHOL 195 07/10/2014     Lab Results   Component Value Date    HDL 66 04/01/2021    HDL 54 08/03/2020    HDL 57 11/25/2019     Lab Results   Component Value Date    LDLCALC 88 04/01/2021    LDLCALC 104 (H) 08/03/2020    LDLCALC 106 (H) 11/25/2019     Lab Results   Component Value Date    TRIG 112 04/01/2021    TRIG 121 08/03/2020    TRIG 106 11/25/2019     No results found for: CHOLHDL    Imaging: No results found  ECG:    Sinus bradycardia nonspecific T-wave changes     Review of Systems   Constitutional: Negative  HENT: Negative  Eyes: Negative  Cardiovascular: Negative  Negative for leg swelling  Respiratory: Negative  Endocrine: Negative  Hematologic/Lymphatic: Negative  Skin: Negative  Musculoskeletal: Negative  Gastrointestinal: Negative  Genitourinary: Negative  Neurological: Negative  Psychiatric/Behavioral: Negative  Vitals:    02/02/23 1453   BP: 170/80   Pulse: 64     Vitals:    02/02/23 1453   Weight: 72 9 kg (160 lb 12 8 oz)     Height: 5' 1 5" (156 2 cm)   Body mass index is 29 89 kg/m²  Physical Exam:  Vital signs reviewed  General:  Alert and cooperative, appears stated age, no acute distress  HEENT:  PERRLA, EOMI, no scleral icterus, no conjunctival pallor  Neck:  No lymphadenopathy, no thyromegaly, no carotid bruits, no elevated JVP  Heart:  Regular rate and rhythm, normal S1/S2, no S3/S4, no murmur, rubs or gallops  PMI nondisplaced  Lungs:  Clear to auscultation bilaterally, no wheezes rales or rhonchi  Abdomen:  Soft, non-tender, positive bowel sounds, no rebound or guarding,   no organomegaly   Extremities:  Normal range of motion    No clubbing, cyanosis or edema   Vascular:  2+ pedal pulses  Skin:  No rashes or lesions on exposed skin  Neurologic:  Cranial nerves II-XII grossly intact without focal deficits  Psych:  Normal mood and affect

## 2023-02-03 ENCOUNTER — OFFICE VISIT (OUTPATIENT)
Dept: PHYSICAL THERAPY | Facility: MEDICAL CENTER | Age: 78
End: 2023-02-03

## 2023-02-03 DIAGNOSIS — M25.562 CHRONIC PAIN OF LEFT KNEE: Primary | ICD-10-CM

## 2023-02-03 DIAGNOSIS — G89.29 CHRONIC PAIN OF LEFT KNEE: Primary | ICD-10-CM

## 2023-02-03 NOTE — PROGRESS NOTES
Daily Note     Today's date: 2/3/2023  Patient name: Denia Park  : 1945  MRN: [id number]  Referring provider: Socrates Rodriguez  Dx:   Encounter Diagnosis     ICD-10-CM    1  Chronic pain of left knee  M25 562     G89 29           Start Time: 1010  Stop Time: 1044  Total time in clinic (min): 34 minutes    Subjective: pt reports that she is doing alright  Says her knee is improving but is not to where she wants it to be  Objective: See treatment diary below      Assessment: Tolerated treatment well  Pt completed all exercises well with good tolerance and performance  Pt noted no adverse symptoms following treatment session  Patient would benefit from continued PT      Plan: Continue per plan of care        Precautions: DM II, HTN, ostepenia      Manuals 2023   2/3        PROM  CC CC CC RK        STM                                       Neuro Re-Ed             QS w/ calf stretch  5" 2x10 5" 2x10 5" 2x10 no calf stretch 5" 2x10 no calf stretch        SAQ  3" 2x10 3" 2x10 3# 3" 3x10 3# 3" 3x10 3#        LAQ   3" 2x10 3# 3" 3x10 3" 3" 3x10 3#        Standing Hip Abd  2x10 2x10 3# 3x10 3# 3x10 3#        QS SLR  4x5 4x5 2x10 2x10                                  Ther Ex             Supine HSS  30" 5x 30" 5x 30" 5x 30"x5        IT Band Stretch  30" 5x 30" 5x 30" 5x 30"x5        Quad Stretch  30" 5x 30" 5x 30" 5x 30"x5        LP             Bike  5' 5' 5' 5'        Step Up    6" 2x10 6" 2x10        Stair Knee Flex Stretch    30" 5x 30" 5x                     Ther Activity                                       Gait Training                                       Modalities

## 2023-02-07 ENCOUNTER — OFFICE VISIT (OUTPATIENT)
Dept: PHYSICAL THERAPY | Facility: MEDICAL CENTER | Age: 78
End: 2023-02-07

## 2023-02-07 DIAGNOSIS — G89.29 CHRONIC PAIN OF LEFT KNEE: Primary | ICD-10-CM

## 2023-02-07 DIAGNOSIS — M25.562 CHRONIC PAIN OF LEFT KNEE: Primary | ICD-10-CM

## 2023-02-07 NOTE — PROGRESS NOTES
Daily Note     Today's date: 2023  Patient name: Wilbert Wells  : 1945  MRN: [id number]  Referring provider: Lise Haq  Dx:   Encounter Diagnosis     ICD-10-CM    1  Chronic pain of left knee  M25 562     G89 29           Start Time: 1013  Stop Time: 1054  Total time in clinic (min): 41 minutes    Subjective: Pt states feeling well after previous session, "I was feeling great " However she states the past two days she has had an exacerbation of pain, she is unsure of a mechanicsm  Does admit to walking a lot three days prior to visit today  Objective: See treatment diary below      Assessment: Tolerated treatment well  Given patient increase in symptoms, no new exercise this session  Does tolerate increased resistance without complaint  Demonstrates an improved and stronger quadriceps contraction  Patient demonstrated fatigue post treatment and would benefit from continued PT      Plan: Continue per plan of care  Progress treatment as tolerated         Precautions: DM II, HTN, ostepenia      Manuals 2023   2/3 2023         PROM  CC CC CC RK CC       STM                                       Neuro Re-Ed             QS w/ calf stretch  5" 2x10 5" 2x10 5" 2x10 no calf stretch 5" 2x10 no calf stretch 5" 2x10       SAQ  3" 2x10 3" 2x10 3# 3" 3x10 3# 3" 3x10 3# 3" 3x10 4#       LAQ   3" 2x10 3# 3" 3x10 3" 3" 3x10 3# 3" 3x10 4#       Standing Hip Abd  2x10 2x10 3# 3x10 3# 3x10 3# 3x10 4#       QS SLR  4x5 4x5 2x10 2x10 2x10 2#                                 Ther Ex             Supine HSS  30" 5x 30" 5x 30" 5x 30"x5 30" 5x       IT Band Stretch  30" 5x 30" 5x 30" 5x 30"x5 30" 5x       Quad Stretch  30" 5x 30" 5x 30" 5x 30"x5 30" 5x       LP             Bike  5' 5' 5' 5' 5'       Step Up    6" 2x10 6" 2x10 6" 2x10       Stair Knee Flex Stretch    30" 5x 30" 5x 30" 5x                     Ther Activity                                       Gait Training                                       Modalities

## 2023-02-09 ENCOUNTER — OFFICE VISIT (OUTPATIENT)
Dept: HEMATOLOGY ONCOLOGY | Facility: CLINIC | Age: 78
End: 2023-02-09

## 2023-02-09 VITALS
WEIGHT: 160 LBS | RESPIRATION RATE: 16 BRPM | DIASTOLIC BLOOD PRESSURE: 72 MMHG | TEMPERATURE: 98.8 F | BODY MASS INDEX: 29.44 KG/M2 | HEIGHT: 62 IN | SYSTOLIC BLOOD PRESSURE: 134 MMHG | OXYGEN SATURATION: 96 % | HEART RATE: 68 BPM

## 2023-02-09 DIAGNOSIS — D47.2 MGUS (MONOCLONAL GAMMOPATHY OF UNKNOWN SIGNIFICANCE): Primary | ICD-10-CM

## 2023-02-09 NOTE — PROGRESS NOTES
Hematology/Oncology Outpatient Follow-up  Nancy Hunter 65 y o  female 1945 [id number]    Date:  2/9/2023      Assessment and Plan:  1  MGUS (monoclonal gammopathy of unknown significance)  76-year-old female presents in follow-up regarding history of MGUS, IgG lambda  Her spike is very small as below  This is stable  Other labs are unremarkable  She is asymptomatic  Recommend repeat in 1 year  We did review the incidence of transformation to multiple myeloma, 1% chance per year  - CBC and differential; Future  - Comprehensive metabolic panel; Future  - Immunoglobulin free LT chains blood; Future  - IgG, IgA, IgM; Future  - Protein electrophoresis, serum; Future      HPI:  65year old female presents for follow up for MGUS  Most recent labs: IgG lambda 0 20 g/dL  Free light chain ratio unremarkable   Serum IgA 539, IgG and IgM normal   CBC and CMP normal     Interval history:    ROS: Review of Systems   Constitutional: Negative for appetite change, chills, fatigue, fever and unexpected weight change  Respiratory: Negative for cough and shortness of breath  Cardiovascular: Negative for chest pain, palpitations and leg swelling  Gastrointestinal: Negative for abdominal pain, constipation, diarrhea, nausea and vomiting  Genitourinary: Negative for difficulty urinating, dysuria and hematuria  Musculoskeletal: Positive for arthralgias (chronic left knee pain )  Skin: Negative  Neurological: Negative for dizziness, weakness, light-headedness, numbness and headaches  Hematological: Negative  Psychiatric/Behavioral: Negative          Past Medical History:   Diagnosis Date   • Actinic keratosis     last assessed - [id number]   • Arthritis    • Atrial fibrillation with rapid ventricular response ([facility] [address] )     last assessed - 26Apr2016   • Basal cell carcinoma    • Benign colon polyp     last assessed - 27Apr2015   • CHF (congestive heart failure) ([facility] [address] ) 06/2022   • Disease of thyroid gland • Effusion of knee joint right     Resolved - 19Apr2016   • Esophageal reflux    • Fibromyalgia     last assessed - 27Dec2017   • Fibromyalgia, primary    • GERD (gastroesophageal reflux disease)    • Hypertension    • Palpitations     last assessed - 30Apr2013   • Peroneal tendonitis, right     last assessed - 02Oct2013   • Right lumbar radiculopathy 03/17/2016   • Thyroid cancer ([facility] [address] )    • Thyroid nodule    • Trochanteric bursitis of left hip 03/09/2018       Past Surgical History:   Procedure Laterality Date   • CARDIAC ELECTROPHYSIOLOGY STUDY AND ABLATION  08/2022   • CATARACT EXTRACTION Bilateral    • COLONOSCOPY  03/2018   • COLONOSCOPY W/ POLYPECTOMY  2021    repeat in 5 years   • EYE SURGERY     • HYSTERECTOMY     • JOINT REPLACEMENT Left     knee   • NE NEUROPLASTY &/TRANSPOS MEDIAN NRV CARPAL TUNNE Right 11/14/2019    Procedure: RELEASE CARPAL TUNNEL;  Surgeon: Dona Salazar MD;  Location: [facility];  Service: Orthopedics   • NE TOTAL THYROID LOBECTOMY UNI W/WO ISTHMUSECTOMY Left 12/16/2020    Procedure: Left THYROID LOBECTOMY;  Surgeon: Shayy Singh MD;  Location: [facility];  Service: ENT   • RECTAL POLYPECTOMY     • REPLACEMENT TOTAL KNEE Left     last assessed - [id number]   • TONSILLECTOMY     • TOTAL ABDOMINAL HYSTERECTOMY     • TUBAL LIGATION     • US GUIDED THYROID BIOPSY  10/14/2020       Social History     Socioeconomic History   • Marital status:       Spouse name: Not on file   • Number of children: Not on file   • Years of education: Not on file   • Highest education level: Not on file   Occupational History   • Not on file   Tobacco Use   • Smoking status: Never   • Smokeless tobacco: Never   Vaping Use   • Vaping Use: Never used   Substance and Sexual Activity   • Alcohol use: Not Currently   • Drug use: Never   • Sexual activity: Not Currently   Other Topics Concern   • Not on file   Social History Narrative   • Not on file     Social Determinants of Health     Financial Resource Strain: Low Risk    • Difficulty of Paying Living Expenses: Not hard at all   Food Insecurity: Not on file   Transportation Needs: No Transportation Needs   • Lack of Transportation (Medical): No   • Lack of Transportation (Non-Medical):  No   Physical Activity: Not on file   Stress: Not on file   Social Connections: Not on file   Intimate Partner Violence: Not on file   Housing Stability: Not on file       Family History   Problem Relation Age of Onset   • Heart disease Mother    • Diabetes Mother    • Heart disease Father    • Coronary artery disease Father    • Stroke Father         cerebrovascular accident   • Heart attack Father         myocardial infarction   • Sudden death Father         scd   • Other Family         Back disorder   • Coronary artery disease Family    • Neuropathy Family    • Osteoporosis Family    • No Known Problems Daughter    • No Known Problems Maternal Grandmother    • No Known Problems Maternal Grandfather    • No Known Problems Paternal Grandmother    • No Known Problems Paternal Grandfather    • Cancer Maternal Uncle    • Breast cancer Maternal Aunt 72   • No Known Problems Son    • No Known Problems Maternal Aunt    • No Known Problems Maternal Aunt    • No Known Problems Maternal Aunt    • No Known Problems Paternal Aunt    • No Known Problems Paternal Aunt    • Anuerysm Neg Hx    • Clotting disorder Neg Hx    • Arrhythmia Neg Hx    • Heart failure Neg Hx        Allergies   Allergen Reactions   • Sotalol Other (See Comments)     Prolonged QTc leading to torsades de pointes    • Penicillins Other (See Comments)     As a child calcium deposit in the arm    • Ace Inhibitors GI Intolerance     Did feel well on it   • Omeprazole Abdominal Pain, Rash and Vomiting     stomach pain, vomiting, rash         Current Outpatient Medications:   •  amLODIPine (NORVASC) 5 mg tablet, Take 1 tablet (5 mg total) by mouth daily, Disp: 90 tablet, Rfl: 3  •  apixaban (ELIQUIS) 5 mg, Take 1 tablet (5 mg total) by mouth 2 (two) times a day, Disp: 48 tablet, Rfl: 0  •  ascorbic acid (VITAMIN C) 500 mg tablet, Take 500 mg by mouth daily , Disp: , Rfl:   •  Cholecalciferol 50 MCG (2000 UT) CAPS, Take 2,000 Units by mouth 2 (two) times a day  , Disp: , Rfl:   •  Chromium Picolinate (CHROMIUM PICOLATE PO), Take 1 tablet by mouth daily, Disp: , Rfl:   •  DULoxetine (CYMBALTA) 30 mg delayed release capsule, take 1 capsule by mouth once daily AFTER BREAKFAST, Disp: 30 capsule, Rfl: 2  •  ezetimibe (ZETIA) 10 mg tablet, Take 1 tablet (10 mg total) by mouth daily, Disp: 90 tablet, Rfl: 3  •  famotidine (PEPCID) 20 mg tablet, Take 20 mg by mouth every other day  , Disp: , Rfl:   •  furosemide (LASIX) 20 mg tablet, take 1 tablet by mouth daily if needed for shortness of breath WEIGHT GAIN 3 LBS IN 1 DAY OR LOWER LEG SWELLING, Disp: 10 tablet, Rfl: 3  •  gabapentin (NEURONTIN) 300 mg capsule, Take 1 capsule (300 mg total) by mouth daily at bedtime, Disp: 30 capsule, Rfl: 5  •  ipratropium (ATROVENT) 0 03 % nasal spray, 2 sprays into each nostril 3 (three) times a day Begin once per day, then progress to twice per day  Progress to three times a day as tolerated  , Disp: 90 mL, Rfl: 3  •  lidocaine (XYLOCAINE) 5 % ointment, Apply topically as needed for mild pain, Disp: 35 44 g, Rfl: 0  •  losartan (COZAAR) 50 mg tablet, Take 1 tablet (50 mg total) by mouth 2 (two) times a day, Disp: 180 tablet, Rfl: 3  •  pantoprazole (PROTONIX) 20 mg tablet, Take 1 tablet by mouth daily before breakfast, Disp: , Rfl:   •  rOPINIRole (REQUIP) 0 5 mg tablet, Take 1 tablet (0 5 mg total) by mouth daily at bedtime, Disp: 90 tablet, Rfl: 3  •  traMADol (ULTRAM) 50 mg tablet, Take 1 tablet (50 mg total) by mouth 2 (two) times a day as needed for moderate pain, Disp: 60 tablet, Rfl: 3  •  TURMERIC PO, Take 1 capsule by mouth 2 (two) times a day, Disp: , Rfl:   •  zolpidem (AMBIEN) 10 mg tablet, Take 1 tablet (10 mg total) by mouth daily at bedtime as needed for sleep, Disp: 90 tablet, Rfl: 1  •  metoprolol succinate (TOPROL-XL) 25 mg 24 hr tablet, Take 3 tablets (75 mg total) by mouth every 12 (twelve) hours, Disp: 540 tablet, Rfl: 3      Physical Exam:  /72 (BP Location: Left arm, Patient Position: Sitting, Cuff Size: Large)   Pulse 68   Temp 98 8 °F (37 1 °C) (Temporal)   Resp 16   Ht 5' 1 5" (1 562 m)   Wt 72 6 kg (160 lb)   LMP 02/01/1990 (Within Weeks)   SpO2 96%   BMI 29 74 kg/m²     Physical Exam  Vitals reviewed  Constitutional:       General: She is not in acute distress  Appearance: She is well-developed  She is not ill-appearing  HENT:      Head: Normocephalic and atraumatic  Eyes:      General: No scleral icterus  Conjunctiva/sclera: Conjunctivae normal    Cardiovascular:      Rate and Rhythm: Normal rate and regular rhythm  Heart sounds: Normal heart sounds  No murmur heard  Pulmonary:      Effort: Pulmonary effort is normal  No respiratory distress  Breath sounds: Normal breath sounds  Abdominal:      Palpations: Abdomen is soft  Tenderness: There is no abdominal tenderness  Musculoskeletal:         General: No tenderness  Normal range of motion  Cervical back: Normal range of motion and neck supple  Right lower leg: No edema  Left lower leg: No edema  Lymphadenopathy:      Cervical: No cervical adenopathy  Skin:     General: Skin is warm and dry  Neurological:      Mental Status: She is alert and oriented to person, place, and time  Cranial Nerves: No cranial nerve deficit     Psychiatric:         Mood and Affect: Mood normal          Behavior: Behavior normal        Labs:  Lab Results   Component Value Date    WBC 5 15 01/31/2023    HGB 13 4 01/31/2023    HCT 41 0 01/31/2023    MCV 95 01/31/2023     01/31/2023       I have spent 15 minutes with Patient  today in which greater than 50% of this time was spent in counseling/coordination of care regarding Diagnostic results and Impressions  Patient voiced understanding and agreement in the above discussion  Aware to contact our office with questions/symptoms in the interim  This note has been generated by voice recognition software system  Therefore, there may be spelling, grammar, and or syntax errors  Please contact if questions arise

## 2023-02-10 ENCOUNTER — APPOINTMENT (OUTPATIENT)
Dept: PHYSICAL THERAPY | Facility: MEDICAL CENTER | Age: 78
End: 2023-02-10

## 2023-02-13 DIAGNOSIS — G47.01 INSOMNIA DUE TO MEDICAL CONDITION: ICD-10-CM

## 2023-02-14 DIAGNOSIS — I48.0 PAROXYSMAL ATRIAL FIBRILLATION (HCC): ICD-10-CM

## 2023-02-14 DIAGNOSIS — I10 ESSENTIAL HYPERTENSION: ICD-10-CM

## 2023-02-14 RX ORDER — LOSARTAN POTASSIUM 50 MG/1
50 TABLET ORAL 2 TIMES DAILY
Qty: 180 TABLET | Refills: 3 | Status: SHIPPED | OUTPATIENT
Start: 2023-02-14

## 2023-02-15 RX ORDER — ZOLPIDEM TARTRATE 10 MG/1
10 TABLET ORAL
Qty: 90 TABLET | Refills: 1 | Status: SHIPPED | OUTPATIENT
Start: 2023-02-15 | End: 2023-02-23 | Stop reason: SDUPTHER

## 2023-02-16 ENCOUNTER — OFFICE VISIT (OUTPATIENT)
Dept: PHYSICAL THERAPY | Facility: MEDICAL CENTER | Age: 78
End: 2023-02-16

## 2023-02-16 DIAGNOSIS — M25.562 CHRONIC PAIN OF LEFT KNEE: Primary | ICD-10-CM

## 2023-02-16 DIAGNOSIS — G89.29 CHRONIC PAIN OF LEFT KNEE: Primary | ICD-10-CM

## 2023-02-16 NOTE — PROGRESS NOTES
Daily Note     Today's date: 2023  Patient name: Georgette Vega  : 1945  MRN: [id number]  Referring provider: Tru Carney,*  Dx: No diagnosis found  Subjective: Pt states feeling stiff today  She states she was up on her feet a lot this morning  Objective: See treatment diary below      Assessment: Tolerated treatment well  Continue to emphasize stretching of quadriceps, for long period of time(greater than 2 minutes) when at home  Pt states understanding and agrees  Patient demonstrated fatigue post treatment and would benefit from continued PT      Plan: Continue per plan of care  Progress treatment as tolerated         Precautions: DM II, HTN, ostepenia    PT 1:1 [id number]  Manuals 2023   2/3 2023        PROM  CC CC CC RK CC CC      STM                                       Neuro Re-Ed             QS w/ calf stretch  5" 2x10 5" 2x10 5" 2x10 no calf stretch 5" 2x10 no calf stretch 5" 2x10 NP      SAQ  3" 2x10 3" 2x10 3# 3" 3x10 3# 3" 3x10 3# 3" 3x10 4# 3" 3x10 4#      LAQ   3" 2x10 3# 3" 3x10 3" 3" 3x10 3# 3" 3x10 4# 3" 3x10 4#      Standing Hip Abd  2x10 2x10 3# 3x10 3# 3x10 3# 3x10 4# 3x10 4#      QS SLR  4x5 4x5 2x10 2x10 2x10 2# 2x10 2#                                Ther Ex             Supine HSS  30" 5x 30" 5x 30" 5x 30"x5 30" 5x 30" 5x      IT Band Stretch  30" 5x 30" 5x 30" 5x 30"x5 30" 5x 30" 5x      Quad Stretch  30" 5x 30" 5x 30" 5x 30"x5 30" 5x 30" 5x      LP             Bike  5' 5' 5' 5' 5' 5'      Step Up    6" 2x10 6" 2x10 6" 2x10       Stair Knee Flex Stretch    30" 5x 30" 5x 30" 5x  30" 5x                    Ther Activity                                       Gait Training                                       Modalities

## 2023-02-17 ENCOUNTER — OFFICE VISIT (OUTPATIENT)
Dept: PHYSICAL THERAPY | Facility: MEDICAL CENTER | Age: 78
End: 2023-02-17

## 2023-02-17 DIAGNOSIS — M25.562 CHRONIC PAIN OF LEFT KNEE: Primary | ICD-10-CM

## 2023-02-17 DIAGNOSIS — G89.29 CHRONIC PAIN OF LEFT KNEE: Primary | ICD-10-CM

## 2023-02-17 NOTE — PROGRESS NOTES
PT Evaluation     Today's date: 2023  Patient name: Flora Sheehan  : 1945  MRN: [id number]  Referring provider: James Flynn  Dx:   Encounter Diagnosis     ICD-10-CM    1  Chronic pain of left knee  M25 562     G89 29           Start Time: 1015  Stop Time: 1045  Total time in clinic (min): 30 minutes    Assessment  Assessment details: Pt has attended 9 sessions to date  She demonstrates improvements in AROM and PROM of her left knee, her strength is also improving  She does continue to use an AD at times  She does remain limited in knee ROM, with greatest deficits in flexion, however extension is still lacking  Patient also continues to demonstrate strength and muscular endurance deficits  Pt remains a good candidate for skilled PT services  PT would continue to benefit from skilled PT services to address functional deficits and return to improved level of function  Vanna May is a 78y/o female who presents to OP PT with a a referral from Rodney Laguna PA-C with a medical diagnosis of pain of left knee  Upon examination pt presents with decreased and painful left knee ROM, decreased LE strength, decreased functional mobility, decreased muscular endurance, poor balance and increased pain  Previously mentioned deficits have impaired patients ability to perform ADLs, recreational activities and house hold duties  Pt was educated on examination findings, diagnosis, prognosis, home exercise program to complete  Pt states understanding and consents to skilled PT services  Pt is a good candidate for skilled PT services  Positive prognositic indicators include desire to improve and active lifestyle  Negative prognostic indicators include chronicity of pain, BMI, CKD, history of falls, HTN, Afib, CHF, neuropathy, DM II, Osteopenia,   Pt would benefit from skilled PT services to address functional deficits to return to PLOF          Impairments: abnormal gait, abnormal muscle firing, abnormal or restricted ROM, abnormal movement, activity intolerance, impaired physical strength, lacks appropriate home exercise program, pain with function and weight-bearing intolerance  Understanding of Dx/Px/POC: good   Prognosis: good    Goals  STG 2 to 6 weeks    1  Patient will report a 2-3 point decrease on NPRS for improved tolerance to ADLs, recreational activities and work duties  MET    2  Pt will be Independent with basic HEP  MET    3  Pt will improve mmt by at least 1/3 muscle grade in all deficient planes  MET       LTG 6 weeks to discharge    1     Patient will demonstrate 0 degrees of knee extension for improved gait mechanics  MET     2    Patient will demonstrate at least 110 degrees of knee flexion to safely negotiate stairs  PROGRESSING    3  Patient will have 5/5 MMT in all knee motions, for improved community ambulation and performance of ADLs and recreational activity  PROGRESSING    4  Patient will return to PLOF in all work duties, ADLs, recreational activites and house hold chores   [facility]        Plan  Patient would benefit from: skilled physical therapy and PT eval  Referral necessary: No  Planned modality interventions: cryotherapy, TENS, thermotherapy: hydrocollator packs, high voltage pulsed current: pain management and high voltage pulsed current: spasm management  Planned therapy interventions: joint mobilization, manual therapy, massage, ADL training, balance, neuromuscular re-education, patient education, strengthening, stretching, therapeutic activities, therapeutic exercise, flexibility, functional ROM exercises, gait training, graded exercise, home exercise program, abdominal trunk stabilization, activity modification, Khoury taping, orthotic fitting/training, orthotic management and training, graded activity and graded motor  Frequency: 2x week  Plan of Care beginning date: 1/17/2023  Plan of Care expiration date: 4/11/2023  Treatment plan discussed with: patient        Subjective Evaluation    History of Present Illness  Mechanism of injury: Subjective: 2/17/23: Pt states today she is feeling very sore  However, she states since starting PT services she has noticed an improvement in her knee pain and function  She does continue to have good and bad days, however bad days, pain is not as high  She does state being compliant with HEP and notes improvement in her pain after HEP  She states she had a very hard time getting comfortable last night due to discomfort after PT visit  Patient states a chronic history of left knee pain  Pt had a left TKA performed 15 years ago  She states a complicated post operative erpative course, in which she under went 2 MUAs  Patient states over the past 12 months, she has experienced an increase in pain and decrease in function  She notes difficulty ascending and descending stairs, pain when standing for a long period of time  Pain  Type: soreness  Current: 4  Best:0  Worst: 6  Alleviates: heat  Aggravates: walking, going up stairs    Occupation: retired    Imaging:  FINDINGS:     There is no acute fracture or dislocation      Small knee effusion seen     Knee arthroplasty seen  There is no finding of loosening or fracture  Patellar resurfacing noted     No lytic or blastic osseous lesion      Soft tissues are unremarkable        Impression; Knee arthroplasty with stable alignmen    PMH: Afib, CHF, HTN, Osteopenia, CKD, CKD, DM II    Previous Surgeries: L Knee TKA , see file for other operative history    Previous Physical Activity: daily walks    Current Medications: denies    NATE: insidious    Surgery Date: n/a     MD: Dr Raheem Rodriguez    Patient Goals: be in less pain, go up and down the stairs without pain               Objective     Palpation   Left   Tenderness of the distal biceps femoris, distal semimembranosus, distal semitendinosus, lateral gastrocnemius, medial gastrocnemius and vastus medialis       Tenderness   Left Knee   Tenderness in the inferior patella, ITB, lateral retinaculum and patellar tendon       Active Range of Motion   Left Knee   Flexion: 88 degrees   Extension: 0 degrees     Right Knee   Flexion: 106 degrees   Extension: 0 degrees   Left Ankle/Foot   Dorsiflexion (kf): 8 degrees     Right Ankle/Foot   Dorsiflexion (kf): 15 degrees     Passive Range of Motion   Left Knee   Flexion: 89 degrees   Extension: 0 degrees     Right Knee   Flexion: 110 degrees   Extension: 0 degrees     Strength/Myotome Testing     Left Hip   Planes of Motion   Flexion: 3  Extension: 3  Abduction: 3    Right Hip   Planes of Motion   Flexion: 3+  Extension: 3+  Abduction: 3+    Left Knee   Flexion: 3  Extension: 3+  Quadriceps contraction: fair    Right Knee   Flexion: 3+  Extension: 3+             Precautions: DM II, HTN, ostepenia    PT 1:1 [id number]  Manuals 1/17/2023 1/24/2023 1/27/2023 1/31/2023   2/3 2/7/2023 2/16/2023 2/17/2023       PROM  CC CC CC RK CC CC CC     STM                                       Neuro Re-Ed             QS w/ calf stretch  5" 2x10 5" 2x10 5" 2x10 no calf stretch 5" 2x10 no calf stretch 5" 2x10 NP 5" 2x10     SAQ  3" 2x10 3" 2x10 3# 3" 3x10 3# 3" 3x10 3# 3" 3x10 4# 3" 3x10 4# 3" 3x10 4#     LAQ   3" 2x10 3# 3" 3x10 3" 3" 3x10 3# 3" 3x10 4# 3" 3x10 4# 3" 3x10 4#     Standing Hip Abd  2x10 2x10 3# 3x10 3# 3x10 3# 3x10 4# 3x10 4# 3x10 4#     QS SLR  4x5 4x5 2x10 2x10 2x10 2# 2x10 2# 2x10 2#                               Ther Ex             Supine HSS  30" 5x 30" 5x 30" 5x 30"x5 30" 5x 30" 5x 30" 5x     IT Band Stretch  30" 5x 30" 5x 30" 5x 30"x5 30" 5x 30" 5x 30" 5x     Quad Stretch  30" 5x 30" 5x 30" 5x 30"x5 30" 5x 30" 5x 30" 5x     LP             Bike  5' 5' 5' 5' 5' 5' 5'     Step Up    6" 2x10 6" 2x10 6" 2x10  NV     Stair Knee Flex Stretch    30" 5x 30" 5x 30" 5x  30" 5x  NV                  Ther Activity             RE        MMT and ROM                  Gait Training Modalities

## 2023-02-20 ENCOUNTER — HOSPITAL ENCOUNTER (OUTPATIENT)
Dept: RADIOLOGY | Facility: MEDICAL CENTER | Age: 78
Discharge: HOME | End: 2023-02-20

## 2023-02-20 VITALS — WEIGHT: 160 LBS | HEIGHT: 62 IN | BODY MASS INDEX: 29.44 KG/M2

## 2023-02-20 DIAGNOSIS — Z12.31 ENCOUNTER FOR SCREENING MAMMOGRAM FOR MALIGNANT NEOPLASM OF BREAST: ICD-10-CM

## 2023-02-20 DIAGNOSIS — Z12.31 SCREENING MAMMOGRAM FOR BREAST CANCER: ICD-10-CM

## 2023-02-21 ENCOUNTER — OFFICE VISIT (OUTPATIENT)
Dept: PHYSICAL THERAPY | Facility: MEDICAL CENTER | Age: 78
End: 2023-02-21

## 2023-02-21 DIAGNOSIS — M25.562 CHRONIC PAIN OF LEFT KNEE: Primary | ICD-10-CM

## 2023-02-21 DIAGNOSIS — G89.29 CHRONIC PAIN OF LEFT KNEE: Primary | ICD-10-CM

## 2023-02-21 NOTE — PROGRESS NOTES
Daily Note     Today's date: 2023  Patient name: Luh Blair  : 1945  MRN: [id number]  Referring provider: Wyatt Burns  Dx:   Encounter Diagnosis     ICD-10-CM    1  Chronic pain of left knee  M25 562     G89 29           Start Time: 1201          Subjective: Pt notes she tripped last night and fell onto both knees  Denies LOC and head strike  Objective: See treatment diary below      Assessment: Tolerated treatment well  Pt with significant improvement to tolerance of activity session  Able to complete prescribed sets and reps with no increase in pain or symptoms  Patient demonstrated fatigue post treatment and would benefit from continued PT      Plan: Continue per plan of care  Progress treatment as tolerated         Precautions: DM II, HTN, ostepenia    PT 1:1 [id number]  Manuals 2023   2/3 2023      PROM  CC CC CC RK CC CC CC CC    STM                                       Neuro Re-Ed             QS w/ calf stretch  5" 2x10 5" 2x10 5" 2x10 no calf stretch 5" 2x10 no calf stretch 5" 2x10 NP 5" 2x10     SAQ  3" 2x10 3" 2x10 3# 3" 3x10 3# 3" 3x10 3# 3" 3x10 4# 3" 3x10 4# 3" 3x10 4# 3x10 4#    LAQ   3" 2x10 3# 3" 3x10 3" 3" 3x10 3# 3" 3x10 4# 3" 3x10 4# 3" 3x10 4# 3x10 4#    Standing Hip Abd  2x10 2x10 3# 3x10 3# 3x10 3# 3x10 4# 3x10 4# 3x10 4#     QS SLR  4x5 4x5 2x10 2x10 2x10 2# 2x10 2# 2x10 2# 3x10 2#                              Ther Ex             Supine HSS  30" 5x 30" 5x 30" 5x 30"x5 30" 5x 30" 5x 30" 5x     IT Band Stretch  30" 5x 30" 5x 30" 5x 30"x5 30" 5x 30" 5x 30" 5x 30" 5x    Quad Stretch  30" 5x 30" 5x 30" 5x 30"x5 30" 5x 30" 5x 30" 5x 30" 5x    LP             Bike  5' 5' 5' 5' 5' 5' 5' 5'    Step Up    6" 2x10 6" 2x10 6" 2x10  NV 6" 2x10    Stair Knee Flex Stretch    30" 5x 30" 5x 30" 5x  30" 5x  NV 30" 5x                 Ther Activity             RE        MMT and ROM Gait Training                                       Modalities

## 2023-02-23 ENCOUNTER — OFFICE VISIT (OUTPATIENT)
Dept: PHYSICAL THERAPY | Facility: MEDICAL CENTER | Age: 78
End: 2023-02-23

## 2023-02-23 DIAGNOSIS — M25.562 CHRONIC PAIN OF LEFT KNEE: Primary | ICD-10-CM

## 2023-02-23 DIAGNOSIS — G47.01 INSOMNIA DUE TO MEDICAL CONDITION: ICD-10-CM

## 2023-02-23 DIAGNOSIS — G89.29 CHRONIC PAIN OF LEFT KNEE: Primary | ICD-10-CM

## 2023-02-23 RX ORDER — ZOLPIDEM TARTRATE 10 MG/1
10 TABLET ORAL
Qty: 90 TABLET | Refills: 1 | Status: SHIPPED | OUTPATIENT
Start: 2023-02-23

## 2023-02-23 NOTE — TELEPHONE ENCOUNTER
1  [id number] 12/05/2022 08/29/2022 Zolpidem Tartrate (Tablet)  90 0 90 10 MG  [address] [facility] [place], [facility] Medicare 1 / 1 [place]    1  [id number] 08/29/2022 08/29/2022 Zolpidem Tartrate (Tablet)  90 0 90 10 MG  [address] Medicare 0 / 1 [place]

## 2023-02-23 NOTE — PROGRESS NOTES
Daily Note     Today's date: 2023  Patient name: Yulisa Schwab  : 1945  MRN: [id number]  Referring provider: Alexander Magallanes  Dx:   Encounter Diagnosis     ICD-10-CM    1  Chronic pain of left knee  M25 562     G89 29                      Subjective: Pt states feeling well  She states she has been using lidocaine to help with her discomfort  Objective: See treatment diary below      Assessment: Tolerated treatment well  Patient demonstrated fatigue post treatment and would benefit from continued PT      Plan: Continue per plan of care  Progress treatment as tolerated         Precautions: DM II, HTN, ostepenia    PT 1:1 8658-8097  Manuals 2023   2/3 2023     PROM  CC CC CC RK CC CC CC CC CC   STM                                       Neuro Re-Ed             QS w/ calf stretch  5" 2x10 5" 2x10 5" 2x10 no calf stretch 5" 2x10 no calf stretch 5" 2x10 NP 5" 2x10     SAQ  3" 2x10 3" 2x10 3# 3" 3x10 3# 3" 3x10 3# 3" 3x10 4# 3" 3x10 4# 3" 3x10 4# 3x10 4# 3x10 4#   LAQ   3" 2x10 3# 3" 3x10 3" 3" 3x10 3# 3" 3x10 4# 3" 3x10 4# 3" 3x10 4# 3x10 4# 3x10 4#   Standing Hip Abd  2x10 2x10 3# 3x10 3# 3x10 3# 3x10 4# 3x10 4# 3x10 4#     QS SLR  4x5 4x5 2x10 2x10 2x10 2# 2x10 2# 2x10 2# 3x10 2# 3x10 3#                             Ther Ex             Supine HSS  30" 5x 30" 5x 30" 5x 30"x5 30" 5x 30" 5x 30" 5x     IT Band Stretch  30" 5x 30" 5x 30" 5x 30"x5 30" 5x 30" 5x 30" 5x 30" 5x 30" 5x   Quad Stretch  30" 5x 30" 5x 30" 5x 30"x5 30" 5x 30" 5x 30" 5x 30" 5x 30" 5x   LP             Bike  5' 5' 5' 5' 5' 5' 5' 5' 5'   Step Up    6" 2x10 6" 2x10 6" 2x10  NV 6" 2x10 6" 2x10   Stair Knee Flex Stretch    30" 5x 30" 5x 30" 5x  30" 5x  NV 30" 5x 30" 5x                Ther Activity             RE        MMT and ROM                  Gait Training                                       Modalities

## 2023-02-28 ENCOUNTER — APPOINTMENT (OUTPATIENT)
Dept: PHYSICAL THERAPY | Facility: MEDICAL CENTER | Age: 78
End: 2023-02-28

## 2023-02-28 DIAGNOSIS — G62.9 PERIPHERAL POLYNEUROPATHY: ICD-10-CM

## 2023-02-28 RX ORDER — GABAPENTIN 300 MG/1
300 CAPSULE ORAL
Qty: 30 CAPSULE | Refills: 5 | Status: SHIPPED | OUTPATIENT
Start: 2023-02-28

## 2023-03-01 ENCOUNTER — OFFICE VISIT (OUTPATIENT)
Dept: OBGYN CLINIC | Facility: HOSPITAL | Age: 78
End: 2023-03-01

## 2023-03-01 ENCOUNTER — HOSPITAL ENCOUNTER (OUTPATIENT)
Dept: RADIOLOGY | Facility: HOSPITAL | Age: 78
Discharge: HOME | End: 2023-03-01
Attending: ORTHOPAEDIC SURGERY

## 2023-03-01 VITALS
HEART RATE: 71 BPM | HEIGHT: 62 IN | BODY MASS INDEX: 30.18 KG/M2 | WEIGHT: 164 LBS | DIASTOLIC BLOOD PRESSURE: 82 MMHG | SYSTOLIC BLOOD PRESSURE: 170 MMHG

## 2023-03-01 DIAGNOSIS — M25.562 LEFT KNEE PAIN, UNSPECIFIED CHRONICITY: ICD-10-CM

## 2023-03-01 DIAGNOSIS — M25.561 ACUTE PAIN OF RIGHT KNEE: ICD-10-CM

## 2023-03-01 DIAGNOSIS — S83.412A SPRAIN OF MEDIAL COLLATERAL LIGAMENT OF LEFT KNEE, INITIAL ENCOUNTER: ICD-10-CM

## 2023-03-01 DIAGNOSIS — M25.562 ACUTE PAIN OF LEFT KNEE: ICD-10-CM

## 2023-03-01 DIAGNOSIS — M70.52 PES ANSERINUS BURSITIS OF LEFT KNEE: Primary | ICD-10-CM

## 2023-03-01 DIAGNOSIS — Z96.652 HISTORY OF TOTAL KNEE REPLACEMENT, LEFT: ICD-10-CM

## 2023-03-01 DIAGNOSIS — M25.561 RIGHT KNEE PAIN, UNSPECIFIED CHRONICITY: ICD-10-CM

## 2023-03-01 RX ORDER — BUPIVACAINE HYDROCHLORIDE 2.5 MG/ML
2 INJECTION, SOLUTION INFILTRATION; PERINEURAL
Status: COMPLETED | OUTPATIENT
Start: 2023-03-01 | End: 2023-03-01

## 2023-03-01 RX ORDER — BETAMETHASONE SODIUM PHOSPHATE AND BETAMETHASONE ACETATE 3; 3 MG/ML; MG/ML
12 INJECTION, SUSPENSION INTRA-ARTICULAR; INTRALESIONAL; INTRAMUSCULAR; SOFT TISSUE
Status: COMPLETED | OUTPATIENT
Start: 2023-03-01 | End: 2023-03-01

## 2023-03-01 RX ADMIN — BETAMETHASONE SODIUM PHOSPHATE AND BETAMETHASONE ACETATE 12 MG: 3; 3 INJECTION, SUSPENSION INTRA-ARTICULAR; INTRALESIONAL; INTRAMUSCULAR; SOFT TISSUE at 14:37

## 2023-03-01 RX ADMIN — BUPIVACAINE HYDROCHLORIDE 2 ML: 2.5 INJECTION, SOLUTION INFILTRATION; PERINEURAL at 14:37

## 2023-03-01 NOTE — PROGRESS NOTES
Assessment:   Diagnosis ICD-10-CM Associated Orders   1  Pes anserinus bursitis of left knee  M70 52 Large joint arthrocentesis: L anserine bursa      2  Sprain of medial collateral ligament of left knee, initial encounter  S83 412A       3  History of total knee replacement, left  Z96 652       4  Acute pain of right knee  M25 561 XR knee 3 vw right non injury      5  Acute pain of left knee  M25 562 XR knee 1 or 2 vw left          Plan:  • X-rays taken and reviewed, physical exam performed  • No fracture on either knee  Her extensor mechanisms are intact despite her recent fall  Right knee appears to aggravate her underlying osteoarthritis in which she declined injection as they do not provide significant relief in her knee is otherwise tolerable today  Her left knee has unchanged mild laxity however has tenderness in the Pes anserine bursa area and MCL insertion  • She was offered, excepted, formed injection of cortisone to her left knee Pes anserine bursa area today for symptomatic relief  She tolerated the procedure well  Ice and postinjection protocol advised  Weightbearing and activities as tolerated  • Advised not to fall anymore  To do next visit:  Return in about 3 months (around 6/1/2023) for re-check, or sooner if symptoms worsen or fail to improve  The above stated was discussed in layman's terms and the patient expressed understanding  All questions were answered to the patient's satisfaction  Scribe Attestation    I,:  Cody Jose am acting as a scribe while in the presence of the attending physician :       I,:  Jhon Lozano MD personally performed the services described in this documentation    as scribed in my presence :             Subjective:   Jared Zavala is a 65 y o  female who presents today for repeat evaluation of her bilateral knees due to return of pain  She fell greater than 1 week ago forward onto both knees and both hands    She tripped over a chair at home  She presents today using a single-point cane for amatory assistance  She has a history of left total knee arthroplasty with mild laxity and canceled a poly upsizing procedure from September 2022  She was doing therapy and felt benefit from the therapy exercises  Her right knee, known advanced osteoarthritis, fails to find adequate long-term relief with cortisone injections  Her last being 3 months ago  Her left knee has pain medially and throbbing inferior to her medial joint line        Review of systems negative unless otherwise specified in HPI  Review of Systems    Past Medical History:   Diagnosis Date   • Actinic keratosis     last assessed - [id number]   • Arthritis    • Atrial fibrillation with rapid ventricular response ([facility] [address] )     last assessed - 26Apr2016   • Basal cell carcinoma    • Benign colon polyp     last assessed - 27Apr2015   • CHF (congestive heart failure) ([facility] [address] ) 06/2022   • Disease of thyroid gland    • Effusion of knee joint right     Resolved - [id number]   • Esophageal reflux    • Fibromyalgia     last assessed - [id number]   • Fibromyalgia, primary    • GERD (gastroesophageal reflux disease)    • Hypertension    • Palpitations     last assessed - 30Apr2013   • Peroneal tendonitis, right     last assessed - 02Oct2013   • Right lumbar radiculopathy 03/17/2016   • Thyroid cancer ([facility] [address] )    • Thyroid nodule    • Trochanteric bursitis of left hip 03/09/2018       Past Surgical History:   Procedure Laterality Date   • CARDIAC ELECTROPHYSIOLOGY STUDY AND ABLATION  08/2022   • CATARACT EXTRACTION Bilateral    • COLONOSCOPY  03/2018   • COLONOSCOPY W/ POLYPECTOMY  2021    repeat in 5 years   • EYE SURGERY     • HYSTERECTOMY     • JOINT REPLACEMENT Left     knee   • OK NEUROPLASTY &/TRANSPOS MEDIAN NRV CARPAL TUNNE Right 11/14/2019    Procedure: RELEASE CARPAL TUNNEL;  Surgeon: Dona Salazar MD;  Location: [facility];  Service: Orthopedics   • OK TOTAL THYROID LOBECTOMY UNI W/WO ISTHMUSECTOMY Left 12/16/2020    Procedure: Left THYROID LOBECTOMY;  Surgeon: Vishal Enriquez MD;  Location: [facility];  Service: ENT   • RECTAL POLYPECTOMY     • REPLACEMENT TOTAL KNEE Left     last assessed - 27Apr2015   • TONSILLECTOMY     • TOTAL ABDOMINAL HYSTERECTOMY     • TUBAL LIGATION     • US GUIDED THYROID BIOPSY  10/14/2020       Family History   Problem Relation Age of Onset   • Heart disease Mother    • Diabetes Mother    • Heart disease Father    • Coronary artery disease Father    • Stroke Father         cerebrovascular accident   • Heart attack Father         myocardial infarction   • Sudden death Father         scd   • Other Family         Back disorder   • Coronary artery disease Family    • Neuropathy Family    • Osteoporosis Family    • No Known Problems Daughter    • No Known Problems Maternal Grandmother    • No Known Problems Maternal Grandfather    • No Known Problems Paternal Grandmother    • No Known Problems Paternal Grandfather    • Cancer Maternal Uncle    • Breast cancer Maternal Aunt 72   • No Known Problems Son    • No Known Problems Maternal Aunt    • No Known Problems Maternal Aunt    • No Known Problems Maternal Aunt    • No Known Problems Paternal Aunt    • No Known Problems Paternal Aunt    • Anuerysm Neg Hx    • Clotting disorder Neg Hx    • Arrhythmia Neg Hx    • Heart failure Neg Hx        Social History     Occupational History   • Not on file   Tobacco Use   • Smoking status: Never   • Smokeless tobacco: Never   Vaping Use   • Vaping Use: Never used   Substance and Sexual Activity   • Alcohol use: Not Currently   • Drug use: Never   • Sexual activity: Not Currently         Current Outpatient Medications:   •  amLODIPine (NORVASC) 5 mg tablet, Take 1 tablet (5 mg total) by mouth daily, Disp: 90 tablet, Rfl: 3  •  apixaban (ELIQUIS) 5 mg, Take 1 tablet (5 mg total) by mouth 2 (two) times a day, Disp: 48 tablet, Rfl: 0  •  ascorbic acid (VITAMIN C) 500 mg tablet, Take 500 mg by mouth daily , Disp: , Rfl:   •  Cholecalciferol 50 MCG (2000 UT) CAPS, Take 2,000 Units by mouth 2 (two) times a day  , Disp: , Rfl:   •  Chromium Picolinate (CHROMIUM PICOLATE PO), Take 1 tablet by mouth daily, Disp: , Rfl:   •  DULoxetine (CYMBALTA) 30 mg delayed release capsule, take 1 capsule by mouth once daily AFTER BREAKFAST, Disp: 30 capsule, Rfl: 2  •  ezetimibe (ZETIA) 10 mg tablet, Take 1 tablet (10 mg total) by mouth daily, Disp: 90 tablet, Rfl: 3  •  famotidine (PEPCID) 20 mg tablet, Take 20 mg by mouth every other day  , Disp: , Rfl:   •  furosemide (LASIX) 20 mg tablet, take 1 tablet by mouth daily if needed for shortness of breath WEIGHT GAIN 3 LBS IN 1 DAY OR LOWER LEG SWELLING, Disp: 10 tablet, Rfl: 3  •  gabapentin (NEURONTIN) 300 mg capsule, Take 1 capsule (300 mg total) by mouth daily at bedtime, Disp: 30 capsule, Rfl: 5  •  ipratropium (ATROVENT) 0 03 % nasal spray, 2 sprays into each nostril 3 (three) times a day Begin once per day, then progress to twice per day  Progress to three times a day as tolerated  , Disp: 90 mL, Rfl: 3  •  lidocaine (XYLOCAINE) 5 % ointment, Apply topically as needed for mild pain, Disp: 35 44 g, Rfl: 0  •  losartan (COZAAR) 50 mg tablet, Take 1 tablet (50 mg total) by mouth 2 (two) times a day, Disp: 180 tablet, Rfl: 3  •  metoprolol succinate (TOPROL-XL) 25 mg 24 hr tablet, Take 3 tablets (75 mg total) by mouth every 12 (twelve) hours, Disp: 540 tablet, Rfl: 3  •  pantoprazole (PROTONIX) 20 mg tablet, Take 1 tablet by mouth daily before breakfast, Disp: , Rfl:   •  rOPINIRole (REQUIP) 0 5 mg tablet, Take 1 tablet (0 5 mg total) by mouth daily at bedtime, Disp: 90 tablet, Rfl: 3  •  traMADol (ULTRAM) 50 mg tablet, Take 1 tablet (50 mg total) by mouth 2 (two) times a day as needed for moderate pain, Disp: 60 tablet, Rfl: 3  •  TURMERIC PO, Take 1 capsule by mouth 2 (two) times a day, Disp: , Rfl:   •  zolpidem (AMBIEN) 10 mg tablet, Take 1 tablet (10 mg total) by mouth daily at bedtime as needed for sleep, Disp: 90 tablet, Rfl: 1    Allergies   Allergen Reactions   • Sotalol Other (See Comments)     Prolonged QTc leading to torsades de pointes    • Penicillins Other (See Comments)     As a child calcium deposit in the arm    • Ace Inhibitors GI Intolerance     Did feel well on it   • Omeprazole Abdominal Pain, Rash and Vomiting     stomach pain, vomiting, rash            Vitals:    03/01/23 1405   BP: 170/82   Pulse: 71       Objective:                    Right Knee Exam     Tenderness   The patient is experiencing tenderness in the medial joint line  Range of Motion   Extension: 0   Flexion: 110 (with mild crepitation)     Other   Erythema: absent  Sensation: normal  Swelling: mild    Comments:    Varus alignment  Intact extensor mechanism       Left Knee Exam     Tenderness   The patient is experiencing tenderness in the pes anserinus (MCL insertion)  Range of Motion   Extension: 0   Flexion: 110 (patella tracks well)     Other   Erythema: absent  Sensation: normal  Swelling: mild    Comments:    Intact extensor mechanism  Healed anterior incision  Mild laxity with varus/valgus stressing in both extension and mid-flexion            Diagnostics, reviewed and taken today if performed as documented: The attending physician has personally reviewed the pertinent films in PACS and interpretation is as follows:    Right and left knee x-rays taken and reviewed in the office today show: Right knee shows advanced degenerative changes of both the medial and patellofemoral compartments with bone-on-bone opposition, varus deformity, subchondral sclerosis and osteophyte formation present  No fracture appreciated  Her left knee shows a intact, well-placed, neutrally aligned and bonded total knee prosthesis without gross signs of loosening or fracture        Procedures, if performed today:    Large joint arthrocentesis: L anserine bursa  Universal Protocol:  Consent: Verbal consent obtained  Risks and benefits: risks, benefits and alternatives were discussed  Consent given by: patient  Time out: Immediately prior to procedure a "time out" was called to verify the correct patient, procedure, equipment, support staff and site/side marked as required  Timeout called at: 3/1/2023 2:36 PM   Patient understanding: patient states understanding of the procedure being performed  Site marked: the operative site was marked  Patient identity confirmed: verbally with patient    Supporting Documentation  Indications: pain and diagnostic evaluation   Procedure Details  Location: knee - L anserine bursa  Preparation: Patient was prepped and draped in the usual sterile fashion  Needle size: 22 G  Ultrasound guidance: no  Approach: anteromedial  Medications administered: 2 mL bupivacaine 0 25 %; 12 mg betamethasone acetate-betamethasone sodium phosphate 6 (3-3) mg/mL    Patient tolerance: patient tolerated the procedure well with no immediate complications  Dressing:  Sterile dressing applied               Portions of the record may have been created with voice recognition software  Occasional wrong word or "sound a like" substitutions may have occurred due to the inherent limitations of voice recognition software  Read the chart carefully and recognize, using context, where substitutions have occurred

## 2023-03-02 ENCOUNTER — OFFICE VISIT (OUTPATIENT)
Dept: PHYSICAL THERAPY | Facility: MEDICAL CENTER | Age: 78
End: 2023-03-02

## 2023-03-02 DIAGNOSIS — G89.29 CHRONIC PAIN OF LEFT KNEE: Primary | ICD-10-CM

## 2023-03-02 DIAGNOSIS — M25.562 CHRONIC PAIN OF LEFT KNEE: Primary | ICD-10-CM

## 2023-03-02 NOTE — PROGRESS NOTES
Daily Note     Today's date: 3/2/2023  Patient name: Yonas Pederson  : 1945  MRN: [id number]  Referring provider: Annie Sierra  Dx:   Encounter Diagnosis     ICD-10-CM    1  Chronic pain of left knee  M25 562     G89 29           Start Time: 1000  Stop Time: 1045  Total time in clinic (min): 45 minutes    Subjective: Pt notes receiving a CSI yesterday  She states feeling sore, however denies significant pain  She is ambulating with SPC today 2/2 soreness  Objective: See treatment diary below      Assessment: Tolerated treatment well  Patient continues to demonstrate improvements in strength and ROM, her functional mobility is also improving relying on an AD less frequently than prior  Patient demonstrated fatigue post treatment and would benefit from continued PT      Plan: Continue per plan of care  Progress treatment as tolerated         Precautions: DM II, HTN, ostepenia    PT 1:1 5697-0773  Manuals 3/2/2023                PROM             STM                                       Neuro Re-Ed             SAQ 3x10 4#            LAQ 3x10 4#            Standing Hip Abd 3x10 4#            QS SLR 3x10 4#                                      Ther Ex             Quad Stretch 30" 5x            LP             Bike 5'            Step Up 6" 2x10            Stair Knee Flex Stretch 30" 5x                         Ther Activity             RE                          Gait Training                                       Modalities

## 2023-03-07 ENCOUNTER — APPOINTMENT (OUTPATIENT)
Dept: PHYSICAL THERAPY | Facility: MEDICAL CENTER | Age: 78
End: 2023-03-07

## 2023-03-07 ENCOUNTER — TELEPHONE (OUTPATIENT)
Dept: CARDIOLOGY CLINIC | Facility: CLINIC | Age: 78
End: 2023-03-07

## 2023-03-07 NOTE — TELEPHONE ENCOUNTER
Miya Boyle called to let you know that on  Sunday and again today,  she is feeling "very tired and washed out"  She said she can feel her heart fluttering /racing when she feels like this  HR right now is 63, /69  Earlier this AM /62 HR 74  I asked her to send in a device transmission      Currently taking:  Metoprolol Succinate 75 mg BID  Losartan 50 mg BID  Amlodipine 5 mg daily  Eliquis 5 mg BID    Please advise

## 2023-03-09 ENCOUNTER — OFFICE VISIT (OUTPATIENT)
Dept: PHYSICAL THERAPY | Facility: MEDICAL CENTER | Age: 78
End: 2023-03-09

## 2023-03-09 DIAGNOSIS — M25.562 CHRONIC PAIN OF LEFT KNEE: Primary | ICD-10-CM

## 2023-03-09 DIAGNOSIS — G89.29 CHRONIC PAIN OF LEFT KNEE: Primary | ICD-10-CM

## 2023-03-09 NOTE — PROGRESS NOTES
Daily Note     Today's date: 3/9/2023  Patient name: Inessa Shay  : 1945  MRN: [id number]  Referring provider: Everardo Olivarez,*  Dx: No diagnosis found  Start Time: 1005  Stop Time: 1045  Total time in clinic (min): 40 minutes    Subjective: Pt notes she had to walk around grocery store the other day, and increased her knee pain  Objective: See treatment diary below      Assessment: Tolerated treatment well  Cues required for decreased right hip shift when performing sit to stand exercise  Patient demonstrated fatigue post treatment and would benefit from continued PT      Plan: Continue per plan of care  Progress treatment as tolerated         Precautions: DM II, HTN, ostepenia    PT 1:1 2662-1620  Manuals 3/2/2023     3/9/2023             PROM             STM                                       Neuro Re-Ed             SAQ 3x10 4# 3x10 5#           LAQ 3x10 4# 3x10 5#           Standing Hip Abd 3x10 4# 3x10 5#           QS SLR 3x10 4# 3x10 5#                                     Ther Ex             Quad Stretch 30" 5x 30" 5x           LP             Bike 5' 5'           Step Up 6" 2x10 6" 2x10           Stair Knee Flex Stretch 30" 5x 30" 5x           Sit to Stand  2x10            Ther Activity             RE                          Gait Training                                       Modalities

## 2023-03-13 DIAGNOSIS — I48.0 PAROXYSMAL ATRIAL FIBRILLATION (HCC): ICD-10-CM

## 2023-03-14 ENCOUNTER — OFFICE VISIT (OUTPATIENT)
Dept: PHYSICAL THERAPY | Facility: MEDICAL CENTER | Age: 78
End: 2023-03-14

## 2023-03-14 DIAGNOSIS — M25.562 CHRONIC PAIN OF LEFT KNEE: Primary | ICD-10-CM

## 2023-03-14 DIAGNOSIS — G89.29 CHRONIC PAIN OF LEFT KNEE: Primary | ICD-10-CM

## 2023-03-14 NOTE — PROGRESS NOTES
Daily Note     Today's date: 3/14/2023  Patient name: Cesar Sanchez  : 1945  MRN: [id number]  Referring provider: Ellen Figueroa  Dx:   Encounter Diagnosis     ICD-10-CM    1  Chronic pain of left knee  M25 562     G89 29           Start Time: 1003  Stop Time: 1035  Total time in clinic (min): 32 minutes    Subjective: Pt notes continued soreness, it improves with using her heating pad  Objective: See treatment diary below      Assessment: Tolerated treatment well  Continue to emphasize with patient to perform HEP with emphasis on stretching and increased ROM  Patient demonstrated fatigue post treatment and would benefit from continued PT      Plan: Continue per plan of care  Progress note during next visit  Progress treatment as tolerated         Precautions: DM II, HTN, ostepenia    PT 1:1 4557-2731  Manuals 3/2/2023     3/9/2023   3/14/2023            PROM             STM                                       Neuro Re-Ed             SAQ 3x10 4# 3x10 5# 3x10 7#          LAQ 3x10 4# 3x10 5# 3x10 7#          Standing Hip Abd 3x10 4# 3x10 5# 3x10 5#          QS SLR 3x10 4# 3x10 5# 3x10 5#                                    Ther Ex             Quad Stretch 30" 5x 30" 5x 30" 5x          LP             Bike 5' 5' 5'          Step Up 6" 2x10 6" 2x10 6" 2x10           Stair Knee Flex Stretch 30" 5x 30" 5x 30" 5x          Sit to Stand  2x10  2x10          Ther Activity             RE                          Gait Training                                       Modalities

## 2023-03-15 ENCOUNTER — RA CDI HCC (OUTPATIENT)
Dept: OTHER | Facility: HOSPITAL | Age: 78
End: 2023-03-15

## 2023-03-15 NOTE — PROGRESS NOTES
I13 0  E11 22  [facility]  coding opportunities          Chart Reviewed number of suggestions sent to Provider: 2     Patients Insurance     Medicare Insurance: [employer]

## 2023-03-17 ENCOUNTER — TELEPHONE (OUTPATIENT)
Dept: CARDIOLOGY CLINIC | Facility: CLINIC | Age: 78
End: 2023-03-17

## 2023-03-17 NOTE — TELEPHONE ENCOUNTER
P/c'd to ask if her bop is to low  Last week ranging from 103//58  Spoke with pt and advised they are normal  She feels fine  Advised to call if she is dizzy or lightheaded with lower BP  Verbally understood

## 2023-03-20 ENCOUNTER — OFFICE VISIT (OUTPATIENT)
Dept: PHYSICAL THERAPY | Facility: MEDICAL CENTER | Age: 78
End: 2023-03-20

## 2023-03-20 DIAGNOSIS — M25.562 CHRONIC PAIN OF LEFT KNEE: Primary | ICD-10-CM

## 2023-03-20 DIAGNOSIS — G89.29 CHRONIC PAIN OF LEFT KNEE: Primary | ICD-10-CM

## 2023-03-20 NOTE — PROGRESS NOTES
Daily Note     Today's date: 3/20/2023  Patient name: Mango Castellano  : 1945  MRN: [id number]  Referring provider: Melanie Lombardo,*  Dx: No diagnosis found  Subjective: Pt states she recently had a bike delivered and is waiting for family to set it up  She states she is feeling sore after walking a lot this weekend  Objective: See treatment diary below      Assessment: Tolerated treatment well  Patient demonstrated fatigue post treatment and would benefit from continued PT      Plan: Continue per plan of care  Potential discharge next visit  Progress treatment as tolerated         Precautions: DM II, HTN, ostepenia    PT 1:1 0031-2809  Manuals 3/2/2023     3/9/2023   3/14/2023   3/20/2023           PROM             STM                                       Neuro Re-Ed             SAQ 3x10 4# 3x10 5# 3x10 7# 3x10 7#         LAQ 3x10 4# 3x10 5# 3x10 7# 3x10 7#         Standing Hip Abd 3x10 4# 3x10 5# 3x10 5# 3x10 5#         QS SLR 3x10 4# 3x10 5# 3x10 5# 3x10 5#                                   Ther Ex             Quad Stretch 30" 5x 30" 5x 30" 5x 30" 5x         LP             Bike 5' 5' 5' 5'         Step Up 6" 2x10 6" 2x10 6" 2x10  8" 2x10         Stair Knee Flex Stretch 30" 5x 30" 5x 30" 5x 30" 5x         Sit to Stand  2x10  2x10 2x10         Ther Activity             RE                          Gait Training                                       Modalities

## 2023-03-22 ENCOUNTER — OFFICE VISIT (OUTPATIENT)
Dept: FAMILY MEDICINE CLINIC | Facility: CLINIC | Age: 78
End: 2023-03-22

## 2023-03-22 ENCOUNTER — OFFICE VISIT (OUTPATIENT)
Dept: PHYSICAL THERAPY | Facility: MEDICAL CENTER | Age: 78
End: 2023-03-22

## 2023-03-22 VITALS
HEIGHT: 62 IN | BODY MASS INDEX: 30.18 KG/M2 | WEIGHT: 164 LBS | RESPIRATION RATE: 17 BRPM | HEART RATE: 70 BPM | DIASTOLIC BLOOD PRESSURE: 74 MMHG | SYSTOLIC BLOOD PRESSURE: 122 MMHG | TEMPERATURE: 97.2 F

## 2023-03-22 DIAGNOSIS — D32.9 MENINGIOMA (HCC): ICD-10-CM

## 2023-03-22 DIAGNOSIS — G89.29 CHRONIC PAIN OF LEFT KNEE: Primary | ICD-10-CM

## 2023-03-22 DIAGNOSIS — I48.0 PAROXYSMAL ATRIAL FIBRILLATION (HCC): ICD-10-CM

## 2023-03-22 DIAGNOSIS — Z95.0 S/P PLACEMENT OF CARDIAC PACEMAKER: ICD-10-CM

## 2023-03-22 DIAGNOSIS — M25.562 CHRONIC PAIN OF LEFT KNEE: Primary | ICD-10-CM

## 2023-03-22 DIAGNOSIS — L57.0 ACTINIC KERATOSES: ICD-10-CM

## 2023-03-22 DIAGNOSIS — I10 ESSENTIAL HYPERTENSION: ICD-10-CM

## 2023-03-22 DIAGNOSIS — N18.31 STAGE 3A CHRONIC KIDNEY DISEASE (HCC): ICD-10-CM

## 2023-03-22 DIAGNOSIS — E11.9 TYPE 2 DIABETES MELLITUS WITHOUT COMPLICATION, WITHOUT LONG-TERM CURRENT USE OF INSULIN (HCC): Primary | ICD-10-CM

## 2023-03-22 DIAGNOSIS — E78.2 MIXED HYPERLIPIDEMIA: ICD-10-CM

## 2023-03-22 DIAGNOSIS — I50.32 CHRONIC DIASTOLIC CHF (CONGESTIVE HEART FAILURE) (HCC): ICD-10-CM

## 2023-03-22 DIAGNOSIS — C73 MALIGNANT NEOPLASM OF THYROID GLAND (HCC): ICD-10-CM

## 2023-03-22 DIAGNOSIS — I27.20 PULMONARY HYPERTENSION (HCC): ICD-10-CM

## 2023-03-22 DIAGNOSIS — D34 HURTHLE CELL ADENOMA OF THYROID: ICD-10-CM

## 2023-03-22 PROBLEM — F11.20 CONTINUOUS OPIOID DEPENDENCE (HCC): Status: RESOLVED | Noted: 2022-06-07 | Resolved: 2023-03-22

## 2023-03-22 PROBLEM — E11.22 TYPE 2 DIABETES MELLITUS WITH STAGE 3A CHRONIC KIDNEY DISEASE, WITHOUT LONG-TERM CURRENT USE OF INSULIN (HCC): Status: ACTIVE | Noted: 2023-03-22

## 2023-03-22 PROBLEM — E11.22 TYPE 2 DIABETES MELLITUS WITH STAGE 3A CHRONIC KIDNEY DISEASE, WITHOUT LONG-TERM CURRENT USE OF INSULIN (HCC): Status: RESOLVED | Noted: 2023-03-22 | Resolved: 2023-03-22

## 2023-03-22 PROBLEM — E83.42 HYPOMAGNESEMIA: Status: RESOLVED | Noted: 2022-06-13 | Resolved: 2023-03-22

## 2023-03-22 NOTE — PROGRESS NOTES
Name: Jacy Yusuf      : 1945      MRN: [id number]  Encounter Provider: Joe Mancuso MD  Encounter Date: 3/22/2023   Encounter department: [address] [place] [address]     1  Type 2 diabetes mellitus without complication, without long-term current use of insulin (HCC)  -     Hemoglobin A1C    2  Essential hypertension  -     Comprehensive metabolic panel  -     CBC and differential    3  Stage 3a chronic kidney disease ([address] )    4  Mixed hyperlipidemia  -     Lipid panel    5  Actinic keratoses  -     Lesion Destruction    6  Chronic diastolic CHF (congestive heart failure) (HCC)    7  Paroxysmal atrial fibrillation ([facility] )    8  Pulmonary hypertension ([facility] )    9  Meningioma ([facility] )    10  Malignant neoplasm of thyroid gland ([facility] )    11  Hurthle cell adenoma of thyroid    12  S/P placement of cardiac pacemaker    Continue with current medications  Office visit in 6 months with repeat labs  Follow-up with Cardiology  She is due for an eye exam      A significant but separately identifiable additional service rendered during the encounter cryosurgery AKs see procedure note  Subjective     Follow up visit  Medications reviewed  Type 2 DM no longer on Glimepiride 1 mg daily due to  hypoglycemia  2023 A1c 6 5 decreased from  7 3  Urine micro albumin normal at 9 2  on ARB  no DPN  Last eye exam > 1 year ago  Hypertension/CKD stage 3 blood pressures have been stable on Losartan 75 mg BID and Metoprolol  mg BID  2023 creatinine 1 01  GFR 53  Electrolytes normal  Hgb 13 4  2022 creatinine 1 19  GFR 47  Hyperlipidemia on Zetia 10 mg daily  2023 Cholesterol 176  Triglycerides 112  HDL 66  LDL 88  LFTs normal except for bilirubin 1 02 2022  Admission for CHF, hyponatremia with altered mental status  Chest x-ray shows small bilateral effusions    Sodium prior to admission 125  On repeat 128 in the setting of volume overload    Patient was treated with IV diuresis with symptomatic improvement  Discharge sodium 136  Creatinine  Mental status improved back to baseline  Limited echocardiogram Small left ventricular systolic function with EF 60% improved from 45%  Right ventricle normal   Dilated left atrium  Known CNS meningoma  Repeat CT scan head during admission Unchanged left frontal extra-axial mass characterize as probable meningioma on prior MRI  No acute intracranial abnormality  04/2022 MRI brain 4 2 x 1 7 x 3 0 cm fairly homogeneously enhancing, extra-axial mass within the inferior lateral aspect of the left anterior cranial fossa  Mild adjacent dural thickening and enhancement is seen extending inferiorly into the middle cranial fossa and superiorly into the dura of the anterior frontal vertex  Findings are most suggestive of meningioma  Ne[facility]surgical evaluation at [facility] 05/2022  Second opinion at [facility]   Repeat MRI brain 11/2022 no report available     Lab Results   Component Value Date    HGBA1C 6 5 (H) 01/31/2023     Lab Results   Component Value Date    WBC 5 15 01/31/2023    HGB 13 4 01/31/2023    HCT 41 0 01/31/2023    MCV 95 01/31/2023     01/31/2023     Lab Results   Component Value Date     05/06/2015    SODIUM 132 (L) 01/31/2023    K 4 4 01/31/2023    CL 98 01/31/2023    CO2 28 01/31/2023    ANIONGAP 9 05/06/2015    AGAP 6 01/31/2023    BUN 23 01/31/2023    CREATININE 1 01 01/31/2023    GLUC 126 11/22/2022    GLUF 112 (H) 01/31/2023    CALCIUM 9 7 01/31/2023    AST 19 01/31/2023    ALT 23 01/31/2023    ALKPHOS 99 01/31/2023    PROT 7 0 05/06/2015    TP 7 2 01/31/2023    TP 7 7 01/31/2023    BILITOT 0 78 05/06/2015    TBILI 1 02 (H) 01/31/2023    EGFR 53 01/31/2023     Lab Results   Component Value Date    [id number] 1 210 01/31/2023     Lab Results   Component Value Date    CHOLESTEROL 176 04/01/2021    CHOLESTEROL 182 08/03/2020    CHOLESTEROL 184 11/25/2019     Lab Results   Component Value Date    HDL 66 04/01/2021    HDL 54 08/03/2020    HDL 57 11/25/2019     Lab Results   Component Value Date    TRIG 112 04/01/2021    TRIG 121 08/03/2020    TRIG 106 11/25/2019     Lab Results   Component Value Date    LDLCALC 88 04/01/2021         Review of Systems   Constitutional: Negative for appetite change, chills, fatigue, fever and unexpected weight change  HENT: Positive for hearing loss (bilateral hearing aids )  Negative for congestion, ear pain, rhinorrhea, sore throat and trouble swallowing  Eyes: Negative for visual disturbance  Respiratory: Negative for cough, shortness of breath and wheezing  Cardiovascular: Negative for chest pain, palpitations and leg swelling  See HPI  05/2022 echo  Left Ventricle: Left ventricular cavity size is normal  Wall thickness is mildly increased  There is moderate concentric hypertrophy  The left ventricular ejection fraction is 45%  Systolic function is mildly reduced  There is mild global hypokinesis  Right Ventricle: Right ventricular cavity size is mildly dilated  Systolic function is mildly reduced  Left Atrium: The atrium is mildly dilated  Right Atrium: The atrium is mildly dilated  Mitral Valve: There is mild annular calcification  There is mild to moderate regurgitation  Tricuspid Valve: There is moderate regurgitation  The right ventricular systolic pressure is severely elevated  The estimated right ventricular systolic pressure is 49 74 mmHg  Prior  cardio versions  03/2021 admission for atrial fibrillation status post ablation procedure  Pre procedure episode of bradycardia with QT prolongation and torsades successfully defibrillated  Subsequent pacemaker placed  Repeat ablation 08/2022 at [facility]  Gastrointestinal: Negative for abdominal pain, blood in stool, constipation, diarrhea, nausea and vomiting  Colonoscopy 04/2018 Two benign polyps  Mild diverticulosis left-sided colon  Endocrine: Negative for polydipsia and polyuria           12/2020 status post left thyroid lobectomy for left thyroid nodule  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  09/2018 DEXA scan T-score -1 5 left femoral neck  On vitamin D supplement    Lab Results       Component                Value               Date                       [id number]             1 210               01/31/2023                                                 Genitourinary: Negative for difficulty urinating  Musculoskeletal: Positive for arthralgias  Negative for myalgias  OA shoulders/ she is using prn Tylenol Arthritis and Tramadol   07/2019 x-rays right shoulder mild degenerative arthritis right AC joint  Left shoulder mild degenerative changes left AC joint  08/2019 bilateral shoulder intra-articular steroid injections via fluoroscopy with symptomatic relief  S/p bilateral TKR  Skin: Negative for rash  Scaly lesions face, nose    Neurological: Positive for tremors  Negative for dizziness and headaches  RLS on Requip  04/2021 neurology evaluation for tremor suspected benign essential  tremor  She is on a Beta-blocker and Gabapentin  Family history + essential tremor brother  Hematological: Negative for adenopathy  Does not bruise/bleed easily  Monoclonal gammopathy  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            Component                Value               Date                       WBC                      5 15                01/31/2023                 HGB                      13 4                01/31/2023                 HCT                      41 0                01/31/2023                 MCV                      95                  01/31/2023                 PLT                      278                 01/31/2023                   IGG       Date                     Value               Ref Range           Status                01/31/2023               905 0               700 0 - 1,600  *     Final 07/11/2022               960 0               700 0 - 1,600  *     Final                 10/27/2021               793 0               700 0-1,600 0 *     Final                        Lab Results       Component                Value               Date                       [id number]               446                 07/01/2022                          MMA elevated at 489   Psychiatric/Behavioral: Positive for dysphoric mood  Negative for sleep disturbance  The patient is nervous/anxious  Stable on low dose Duloxetine 30 mg/day    Chronic insomnia on prn Zolpidem 10 mg                 Past Medical History:   Diagnosis Date   • Actinic keratosis     last assessed - [id number]   • Arthritis    • Atrial fibrillation with rapid ventricular response (HCC)     last assessed - 26Apr2016   • Basal cell carcinoma    • Benign colon polyp     last assessed - 27Apr2015   • CH[facility] (congestive [facility] failure) ([facility] ) 06/2022   • Disease of thyroid gland    • Effusion of knee joint right     Resolved - [id number]   • Esophageal reflux    • Fibromyalgia     last assessed - [id number]   • Fibromyalgia, primary    • GERD (gastroesophageal reflux disease)    • Hypertension    • Palpitations     last assessed - 30Apr2013   • Peroneal tendonitis, right     last assessed - 02Oct2013   • Right lumbar radiculopathy 03/17/2016   • Thyroid cancer ([facility] )    • Thyroid nodule    • Trochanteric bursitis of left hip 03/09/2018     Past Surgical History:   Procedure Laterality Date   • CARDIAC ELECTROPHYSIOLOGY STUDY AND ABLATION  08/2022   • CATARACT EXTRACTION Bilateral    • COLONOSCOPY  03/2018   • COLONOSCOPY W/ POLYPECTOMY  2021    repeat in 5 years   • EYE SURGERY     • HYSTERECTOMY     • JOINT REPLACEMENT Left     knee   • IN NEUROPLASTY &/TRANSPOS MEDIAN NRV CARPAL TUNNE Right 11/14/2019    Procedure: RELEASE CARPAL TUNNEL;  Surgeon: Carl Hirsch MD;  Location: [facility];  Service: Orthopedics   • IN TOTAL THYROID LOBECTOMY UNI W/WO ISTHMUSECTOMY Left 12/16/2020    Procedure: Left THYROID LOBECTOMY;  Surgeon: Michaelle Worthington MD;  Location: [facility];  Service: ENT   • RECTAL POLYPECTOMY     • REPLACEMENT TOTAL KNEE Left     last assessed - 27Apr2015   • TONSILLECTOMY     • TOTAL ABDOMINAL HYSTERECTOMY     • TUBAL LIGATION     • US GUIDED THYROID BIOPSY  10/14/2020     Family History   Problem Relation Age of Onset   • Heart disease Mother    • Diabetes Mother    • Heart disease Father    • Coronary artery disease Father    • Stroke Father         cerebrovascular accident   • Heart attack Father         myocardial infarction   • Sudden death Father         scd   • Other Family         Back disorder   • Coronary artery disease Family    • Neuropathy Family    • Osteoporosis Family    • No Known Problems Daughter    • No Known Problems Maternal Grandmother    • No Known Problems Maternal Grandfather    • No Known Problems Paternal Grandmother    • No Known Problems Paternal Grandfather    • Cancer Maternal Uncle    • Breast cancer Maternal Aunt 72   • No Known Problems Son    • No Known Problems Maternal Aunt    • No Known Problems Maternal Aunt    • No Known Problems Maternal Aunt    • No Known Problems Paternal Aunt    • No Known Problems Paternal Aunt    • Anuerysm Neg Hx    • Clotting disorder Neg Hx    • Arrhythmia Neg Hx    • Heart failure Neg Hx      Social History     Socioeconomic History   • Marital status:       Spouse name: None   • Number of children: None   • Years of education: None   • Highest education level: None   Occupational History   • None   Tobacco Use   • Smoking status: Never   • Smokeless tobacco: Never   Vaping Use   • Vaping Use: Never used   Substance and Sexual Activity   • Alcohol use: Not Currently   • Drug use: Never   • Sexual activity: Not Currently   Other Topics Concern   • None   Social History Narrative   • None     Social Determinants of Health     Financial Resource Strain: Low Risk    • Difficulty of Paying Living Expenses: Not hard at all   Food Insecurity: Not on file   Transportation Needs: No Transportation Needs   • Lack of Transportation (Medical): No   • Lack of Transportation (Non-Medical): No   Physical Activity: Not on file   Stress: Not on file   Social Connections: Not on file   Intimate Partner Violence: Not on file   Housing Stability: Not on file     Current Outpatient Medications on File Prior to Visit   Medication Sig   • amLODIPine (NORVASC) 5 mg tablet Take 1 tablet (5 mg total) by mouth daily   • apixaban (ELIQUIS) 5 mg Take 1 tablet (5 mg total) by mouth 2 (two) times a day   • ascorbic acid (VITAMIN C) 500 mg tablet Take 500 mg by mouth daily    • Cholecalciferol 50 MCG (2000 UT) CAPS Take 2,000 Units by mouth 2 (two) times a day     • Chromium Picolinate (CHROMIUM PICOLATE PO) Take 1 tablet by mouth daily   • DULoxetine (CYMBALTA) 30 mg delayed release capsule take 1 capsule by mouth once daily AFTER BREAKFAST   • ezetimibe (ZETIA) 10 mg tablet Take 1 tablet (10 mg total) by mouth daily   • famotidine (PEPCID) 20 mg tablet Take 20 mg by mouth every other day     • furosemide (LASIX) 20 mg tablet take 1 tablet by mouth daily if needed for shortness of breath WEIGHT GAIN 3 LBS IN 1 DAY OR LOWER LEG SWELLING   • gabapentin (NEURONTIN) 300 mg capsule Take 1 capsule (300 mg total) by mouth daily at bedtime   • ipratropium (ATROVENT) 0 03 % nasal spray 2 sprays into each nostril 3 (three) times a day Begin once per day, then progress to twice per day  Progress to three times a day as tolerated     • lidocaine (XYLOCAINE) 5 % ointment Apply topically as needed for mild pain   • losartan (COZAAR) 50 mg tablet Take 1 tablet (50 mg total) by mouth 2 (two) times a day   • metoprolol succinate (TOPROL-XL) 25 mg 24 hr tablet Take 3 tablets (75 mg total) by mouth every 12 (twelve) hours   • pantoprazole (PROTONIX) 20 mg tablet Take 1 tablet by mouth daily before breakfast   • rOPINIRole (REQUIP) 0 5 mg tablet Take 1 tablet (0 5 mg total) by mouth daily at bedtime   • traMADol (ULTRAM) 50 mg tablet Take 1 tablet (50 mg total) by mouth 2 (two) times a day as needed for moderate pain   • TURMERIC PO Take 1 capsule by mouth 2 (two) times a day   • zolpidem (AMBIEN) 10 mg tablet Take 1 tablet (10 mg total) by mouth daily at bedtime as needed for sleep     Allergies   Allergen Reactions   • Sotalol Other (See Comments)     Prolonged QTc leading to torsades de pointes    • Penicillins Other (See Comments)     As a child calcium deposit in the arm    • Ace Inhibitors GI Intolerance     Did feel well on it   • Omeprazole Abdominal Pain, Rash and Vomiting     stomach pain, vomiting, rash     Immunization History   Administered Date(s) Administered   • COVID-19 MODERNA VACC 0 5 ML IM 01/27/2021, 02/24/2021   • INFLUENZA 12/23/2015, 11/07/2016, 10/18/2017, 12/04/2018, 09/19/2022   • Influenza Split High Dose Preservative Free IM 10/01/2014, 12/23/2015, 11/07/2016, 10/18/2017   • Influenza, high dose seasonal 0 7 mL 12/04/2018, 09/26/2019, 09/08/2020, 11/03/2021, 09/19/2022   • Influenza, seasonal, injectable 10/16/2012   • Pneumococcal Conjugate 13-Valent 04/27/2015   • Pneumococcal Polysaccharide PPV23 03/29/2022       Objective     /74 (BP Location: Left arm, Patient Position: Sitting, Cuff Size: Standard)   Pulse 70   Temp (!) 97 2 °F (36 2 °C) (Tympanic)   Resp 17   Ht 5' 1 5" (1 562 m)   Wt 74 4 kg (164 lb)   LMP 02/01/1990 (Within Weeks)   BMI 30 49 kg/m²       Physical Exam  Vitals and nursing note reviewed  Constitutional:       General: She is not in acute distress  Appearance: She is well-developed  HENT:      Right Ear: Tympanic membrane and ear canal normal       Left Ear: Tympanic membrane and ear canal normal    Eyes:      General: No scleral icterus  Extraocular Movements: Extraocular movements intact        Conjunctiva/sclera: Conjunctivae normal       Pupils: Pupils are equal, round, and reactive to light  Neck:      Thyroid: No thyroid mass or thyromegaly  Vascular: No carotid bruit or JVD  Trachea: No tracheal deviation  Cardiovascular:      Rate and Rhythm: Normal rate and regular rhythm  Heart sounds: Normal heart sounds  No murmur heard  No gallop  Pulmonary:      Effort: Pulmonary effort is normal  No respiratory distress  Breath sounds: Normal breath sounds  No wheezing or rales  Musculoskeletal:      Right lower leg: No edema  Left lower leg: No edema  Lymphadenopathy:      Cervical: No cervical adenopathy  Upper Body:      Right upper body: No supraclavicular adenopathy  Left upper body: No supraclavicular adenopathy  Skin:     Findings: Lesion present  No rash  Nails: There is no clubbing  Comments: AKs L check and nose (1)    Neurological:      General: No focal deficit present  Mental Status: She is alert and oriented to person, place, and time  Psychiatric:         Mood and Affect: Mood normal          Behavior: Behavior normal        Lesion Destruction    Date/Time: 3/22/2023 10:30 AM  Performed by: Makayla Cintron MD  Authorized by: Makayla Cintron MD   Universal Protocol:  Consent: Verbal consent obtained    Risks and benefits: risks, benefits and alternatives were discussed  Consent given by: patient      Procedure Details - Lesion Destruction:     Number of Lesions:  4  Lesion 1:     Body area:  Head/neck    Head/neck location:  L cheek    Initial size (mm):  3    Final defect size (mm):  3    Malignancy: pre-malignant lesion      Destruction method: cryotherapy    Lesion 2:     Body area:  Head/neck    Head/neck location:  L cheek    Initial size (mm):  3    Final defect size (mm):  3    Malignancy: pre-malignant lesion      Destruction method: cryotherapy    Lesion 3:     Body area:  [address]    Head/neck location:  L cheek    Initial size (mm):  3    Final defect size (mm):  3    Malignancy: pre-malignant lesion      Destruction method: cryotherapy    Lesion 4:     Body area:  Head/neck    Head/neck location:  Nose    Initial size (mm):  2    Final defect size (mm):  2    Malignancy: pre-malignant lesion      Destruction method: cryotherapy       A total of 4 AKs treated with light cryosurgery   Wound care instructions reviewed       Aida Thomas MD

## 2023-03-22 NOTE — PROGRESS NOTES
PT Re-Evaluation  and PT Discharge    Today's date: 3/22/2023  Patient name: Vadim Juarez  : 1945  MRN: [id number]  Referring provider: Micael Ahumada  Dx:   Encounter Diagnosis     ICD-10-CM    1  Chronic pain of left knee  M25 [id number]     G89 [id number]                      Assessment  Assessment details: Pt has attended 15 sessions of skilled PT services to date  Pt presents today with small improvements in ROM, strength, functional mobility  Although patient demonstrates improvements in ROM and strength, she does continue to intermittently utilize an AD due to an overall sore sensation of her LE  Given progress to date, patient is agreeable to transition from skilled PT services to independent management with HEP  HEP was reviewed with patient, patient also further educated on promotion of active lifestyle, increased general activity  At this time patient will be formally discharged from PT caseload  Pt has attended 9 sessions to date  She demonstrates improvements in AROM and PROM of her left knee, her strength is also improving  She does continue to use an AD at times  She does remain limited in knee ROM, with greatest deficits in flexion, however extension is still lacking  Patient also continues to demonstrate strength and muscular endurance deficits  Pt remains a good candidate for skilled PT services  PT would continue to benefit from skilled PT services to address functional deficits and return to improved level of function  Brett Johnson is a 76y/o female who presents to OP PT with a a referral from Ernestine Arnett PA-C with a medical diagnosis of pain of left knee  Upon examination pt presents with decreased and painful left knee ROM, decreased LE strength, decreased functional mobility, decreased muscular endurance, poor balance and increased pain  Previously mentioned deficits have impaired patients ability to perform ADLs, recreational activities and house hold duties   Pt was educated on examination findings, diagnosis, prognosis, home exercise program to complete  Pt states understanding and consents to skilled PT services  Pt is a good candidate for skilled PT services  Positive prognositic indicators include desire to improve and active lifestyle  Negative prognostic indicators include chronicity of pain, BMI, CKD, history of falls, HTN, Afib, CHF, neuropathy, DM II, Osteopenia,   Pt would benefit from skilled PT services to address functional deficits to return to PLOF  Impairments: abnormal gait, abnormal muscle firing, abnormal or restricted ROM, abnormal movement, activity intolerance, impaired physical strength, lacks appropriate home exercise program, pain with function and weight-bearing intolerance  Understanding of Dx/Px/POC: good   Prognosis: good    Goals  STG 2 to 6 weeks    1  Patient will report a 2-3 point decrease on NPRS for improved tolerance to ADLs, recreational activities and work duties  MET    2  Pt will be Independent with basic HEP  MET    3  Pt will improve mmt by at least 1/3 muscle grade in all deficient planes  MET       LTG 6 weeks to discharge    1     Patient will demonstrate 0 degrees of knee extension for improved gait mechanics  MET     2    Patient will demonstrate at least 110 degrees of knee flexion to safely negotiate stairs  PROGRESSING    3  Patient will have 5/5 MMT in all knee motions, for improved community ambulation and performance of ADLs and recreational activity  PROGRESSING    4  Patient will return to PLOF in all work duties, ADLs, recreational activites and house hold chores   [facility]        Plan  Patient would benefit from: skilled physical therapy and PT eval  Referral necessary: No  Planned modality interventions: cryotherapy, TENS, thermotherapy: hydrocollator packs, high voltage pulsed current: pain management and high voltage pulsed current: spasm management  Planned therapy interventions: joint mobilization, manual therapy, massage, ADL training, balance, neuromuscular re-education, patient education, strengthening, stretching, therapeutic activities, therapeutic exercise, flexibility, functional ROM exercises, gait training, graded exercise, home exercise program, abdominal trunk stabilization, activity modification, Khoury taping, orthotic fitting/training, orthotic management and training, graded activity and graded motor  Frequency: 2x week  Plan of Care beginning date: 1/17/2023  Plan of Care expiration date: 4/11/2023  Treatment plan discussed with: patient        Subjective Evaluation    History of Present Illness  Mechanism of injury: Subjective: 3/22/23: Overall patient states feeling well  She is using a SPC due to soreness  She states she uses it at times throughout the week due to an overall soreness in her LE  She states she recently acquired a recumbent bike that her son in law put together  2/17/23: Pt states today she is feeling very sore  However, she states since starting PT services she has noticed an improvement in her knee pain and function  She does continue to have good and bad days, however bad days, pain is not as high  She does state being compliant with HEP and notes improvement in her pain after HEP  She states she had a very hard time getting comfortable last night due to discomfort after PT visit  Patient states a chronic history of left knee pain  Pt had a left TKA performed 15 years ago  She states a complicated post operative erpative course, in which she under went 2 MUAs  Patient states over the past 12 months, she has experienced an increase in pain and decrease in function  She notes difficulty ascending and descending stairs, pain when standing for a long period of time       Pain  Type: soreness  Current: 2  Best:0  Worst: 3  Alleviates: heat  Aggravates: walking, going up stairs    Occupation: retired    Imaging:  FINDINGS:     There is no acute fracture or dislocation      Small knee effusion seen     Knee arthroplasty seen  There is no finding of loosening or fracture  Patellar resurfacing noted     No lytic or blastic osseous lesion      Soft tissues are unremarkable        Impression; Knee arthroplasty with stable alignmen    PMH: Afib, CHF, HTN, Osteopenia, CKD, CKD, DM II    Previous Surgeries: L Knee TKA , see file for other operative history    Previous Physical Activity: daily walks    Current Medications: denies    NATE: insidious    Surgery Date: n/a     MD: Dr Gibson Blood    Patient Goals: be in less pain, go up and down the stairs without pain               Objective     Palpation   Left   Tenderness of the distal biceps femoris, distal semimembranosus, distal semitendinosus, lateral gastrocnemius, medial gastrocnemius and vastus medialis  Tenderness   Left Knee   Tenderness in the inferior patella, ITB, lateral retinaculum and patellar tendon       Active Range of Motion   Left Knee   Flexion: 95 degrees   Extension: 0 degrees     Right Knee   Flexion: 106 degrees   Extension: 0 degrees   Left Ankle/Foot   Dorsiflexion (kf): 8 degrees     Right Ankle/Foot   Dorsiflexion (kf): 15 degrees     Passive Range of Motion   Left Knee   Flexion: 95 degrees   Extension: 0 degrees     Right Knee   Flexion: 110 degrees   Extension: 0 degrees     Strength/Myotome Testing     Left Hip   Planes of Motion   Flexion: 4+  Extension: 3+  Abduction: 3+    Right Hip   Planes of Motion   Flexion: 3+  Extension: 3+  Abduction: 3+    Left Knee   Flexion: 3+  Extension: 4  Quadriceps contraction: fair    Right Knee   Flexion: 3+  Extension: 3+             Precautions: DM II, HTN, ostepenia    PT 1:1       Manuals 3/2/2023     3/9/2023   3/14/2023   3/20/2023   3/22/2023          PROM             STM                                       Neuro Re-Ed             SAQ 3x10 4# 3x10 5# 3x10 7# 3x10 7# 3x10 7#        LAQ 3x10 4# 3x10 5# 3x10 7# 3x10 7# 3x10 7#        Standing Hip Abd 3x10 4# 3x10 5# 3x10 5# 3x10 5# 3x10 5#        QS SLR 3x10 4# 3x10 5# 3x10 5# 3x10 5# 3x10 5#                                  Ther Ex             Quad Stretch 30" 5x 30" 5x 30" 5x 30" 5x 30" 5x        LP             Bike 5' 5' 5' 5' 5'        Step Up 6" 2x10 6" 2x10 6" 2x10  8" 2x10 8" 2x10        Stair Knee Flex Stretch 30" 5x 30" 5x 30" 5x 30" 5x 30" 5x        Sit to Stand  2x10  2x10 2x10 NP        Ther Activity             RE                          Gait Training                                       Modalities

## 2023-03-23 ENCOUNTER — TELEPHONE (OUTPATIENT)
Dept: FAMILY MEDICINE CLINIC | Facility: CLINIC | Age: 78
End: 2023-03-23

## 2023-03-23 NOTE — TELEPHONE ENCOUNTER
Patient saw Dr Cortney Cruz yesterday  She forgot to mention that she has been having pain in the muscle in her left leg for the last 4 months  She currently goes for PT for left knee  No other symptoms with it  She also is complaining of sinus congestion for the last year  She uses coricidin as needed  Is there anything else you could recommend? Should she be seen again?

## 2023-03-24 NOTE — TELEPHONE ENCOUNTER
Patient Transferred to: [facility]  Handoff Report Given to: [facility] RN Spoke with patient, gave message  She wants to see Dr Everette Lawrence again for her knee pain (see message)  Can you call her to schedule

## 2023-03-28 ENCOUNTER — OFFICE VISIT (OUTPATIENT)
Dept: FAMILY MEDICINE CLINIC | Facility: CLINIC | Age: 78
End: 2023-03-28

## 2023-03-28 VITALS
HEART RATE: 70 BPM | DIASTOLIC BLOOD PRESSURE: 80 MMHG | SYSTOLIC BLOOD PRESSURE: 114 MMHG | TEMPERATURE: 97.2 F | HEIGHT: 62 IN | WEIGHT: 165.4 LBS | BODY MASS INDEX: 30.44 KG/M2

## 2023-03-28 DIAGNOSIS — S86.891A RIGHT MEDIAL TIBIAL STRESS SYNDROME, INITIAL ENCOUNTER: ICD-10-CM

## 2023-03-28 DIAGNOSIS — I48.0 PAROXYSMAL ATRIAL FIBRILLATION (HCC): ICD-10-CM

## 2023-03-28 DIAGNOSIS — M17.11 PRIMARY OSTEOARTHRITIS OF RIGHT KNEE: ICD-10-CM

## 2023-03-28 DIAGNOSIS — S86.892A LEFT MEDIAL TIBIAL STRESS SYNDROME, INITIAL ENCOUNTER: Primary | ICD-10-CM

## 2023-03-28 DIAGNOSIS — M79.7 FIBROMYALGIA: ICD-10-CM

## 2023-03-28 RX ORDER — TRAMADOL HYDROCHLORIDE 50 MG/1
50 TABLET ORAL 2 TIMES DAILY PRN
Qty: 60 TABLET | Refills: 3 | Status: SHIPPED | OUTPATIENT
Start: 2023-03-28 | End: 2023-03-31 | Stop reason: SDUPTHER

## 2023-03-28 NOTE — PROGRESS NOTES
Name: Maral Acosta      : 1945      MRN: [id number]  Encounter Provider: Rell Hinojosa MD  Encounter Date: 3/28/2023   Encounter department: Pending sale to [facility]     1  Left medial tibial stress syndrome, initial encounter    2  Right medial tibial stress syndrome, initial encounter    3  Fibromyalgia    4  Primary osteoarthritis of right knee  -     traMADol (ULTRAM) 50 mg tablet; Take 1 tablet (50 mg total) by mouth 2 (two) times a day as needed for moderate pain    5  Paroxysmal atrial fibrillation (HCC) on Eliquis     continue with PRN Tylenol with PRN Tramadol  Consider PT re evaluation  Subjective     Recurrent bilateral shin pain and muscle pain L upper leg  No swelling  No history of DVTs  S/p L TKR   Known OA R knee  She was seen by Ortho this month and received a steroid injection L anserine bursa  S/p R knee steroid injection 2023  Known flat feet  She has been using PRN Tylenol for pain  No longer on Tramadol or Duloxetine  She just completed PT for left leg pain  Medications reviewed  Type 2 DM no longer on Glimepiride 1 mg daily due to  hypoglycemia  2023 A1c 6 5 decreased from  7 3  Urine micro albumin normal at 9 2  on ARB  no DPN  Last eye exam > 1 year ago  Hypertension/CKD stage 3 blood pressures have been stable on Losartan 75 mg BID and Metoprolol  mg BID  2023 creatinine 1 01  GFR 53  Electrolytes normal  Hgb 13 4  2022 creatinine 1 19  GFR 47  Hyperlipidemia on Zetia 10 mg daily  2023 Cholesterol 176  Triglycerides 112  HDL 66  LDL 88  LFTs normal except for bilirubin 1 02 2022  Admission for CHF, hyponatremia with altered mental status  Chest x-ray shows small bilateral effusions    Sodium prior to admission 125  On repeat 128 in the setting of volume overload  Patient was treated with IV diuresis with symptomatic improvement  Discharge sodium 136   Creatinine  Mental status improved back to baseline  Limited echocardiogram Small left ventricular systolic function with EF 60% improved from 45%  Right ventricle normal   Dilated left atrium  Known CNS meningoma  Repeat CT scan head during admission Unchanged left frontal extra-axial mass characterize as probable meningioma on prior MRI  No acute intracranial abnormality  04/2022 MRI brain 4 2 x 1 7 x 3 0 cm fairly homogeneously enhancing, extra-axial mass within the inferior lateral aspect of the left anterior cranial fossa  Mild adjacent dural thickening and enhancement is seen extending inferiorly into the middle cranial fossa and superiorly into the dura of the anterior frontal vertex  Findings are most suggestive of meningioma  [facility] at [facility] 05/2022  Second opinion at [facility]  Repeat MRI brain 11/2022 no report available     Procedure: XR knee 1 or 2 vw left    Result Date: 3/4/2023  Narrative: LEFT KNEE INDICATION:   M25 562: Pain in left knee  COMPARISON:  3/29/2022 VIEWS:  XR KNEE 1 OR 2 VW LEFT FINDINGS: There is no acute fracture or dislocation  Small joint effusion noted  Knee prosthesis has a stable appearance from prior other than perhaps very slight amount of lucency between the bone and the anterior portion of the femoral prosthesis as seen on the lateral view  This small lucent gap measures about 2 mm  It is not visible on the most recent prior study  Whether this is simply a difference in projection or represents slight migration of the hardware or loosening is uncertain  Correlate with physical exam findings  No lytic or blastic osseous lesion  There is some swelling anteriorly below the patella       Impression: There is now a 2 mm space between the anterior margin of the femoral component of the knee prosthesis and the underlying bone, as seen on the lateral view Persistent small joint effusion Workstation performed: [id number]     Procedure: XR knee 3 vw right non injury    Result Date: 3/4/2023  Narrative: RIGHT KNEE INDICATION:   M25 561: Pain in right knee  COMPARISON:  11/5/2021 and 3/29/2022 VIEWS:  XR KNEE 3 VW RIGHT NON INJURY FINDINGS: There is no acute fracture or dislocation  There is no joint effusion  Moderate to marked narrowing of the medial joint space with sclerosis of articular surfaces and minimal osteophytic spurring, similar appearance to the prior exam  No lytic or blastic osseous lesion  Soft tissues are unremarkable  Impression: At least moderate osteoarthritis medially Workstation performed: [id number]     Procedure: Mammo screening bilateral w 3d & cad    Result Date: 2/23/2023  Narrative: DIAGNOSIS: Screening mammogram for breast cancer; Encounter for screening mammogram for malignant neoplasm of breast TECHNIQUE: Digital screening mammography was performed  Computer Aided Detection (CAD) analyzed all applicable images  COMPARISONS: Prior breast imaging dated: 02/17/2022, 10/04/2019, 09/14/2018, 05/01/2017, 04/08/2016, 02/11/2015, and 01/02/2014 RELEVANT HISTORY: Family Breast Cancer History: History of breast cancer in Maternal Aunt  Family Medical History: Family medical history includes breast cancer in maternal aunt  Personal History: No known relevant hormone history  Surgical history includes hysterectomy  No known relevant medical history  The patient is scheduled in a reminder system for screening mammography  8-10% of cancers will be missed on mammography  Management of a palpable abnormality must be based on clinical grounds  Patients will be notified of their results via letter from our facility  Accredited by [employer] of Radiology and FDA  RISK ASSESSMENT: 5 Year Tyrer-Cuzick: 1 7 % 10 Year Tyrer-Cuzick: No Score Lifetime Tyrer-Cuzick: 2 42 % TISSUE DENSITY: There are scattered areas of fibroglandular density  INDICATION: Amber Dunn is a 65 y o  female presenting for screening mammography   FINDINGS: There are no suspicious masses, grouped microcalcifications or "areas of architectural distortion  The skin and nipple areolar complex are unremarkable  Impression: No mammographic evidence of malignancy  ASSESSMENT/BI-RADS CATEGORY: Left: 1 - Negative Right: 1 - Negative Overall: 1 - Negative RECOMMENDATION:      - Routine screening mammogram in 1 year for both breasts  Workstation ID: [id number]     Procedure: Cardiac EP device report    Result Date: 3/7/2023  Narrative: MDT-DUAL CHAMBER PPM (AAIR-DDDR MODE)/ ACTIVE SYSTEM IS MRI CONDITIONAL CARELINK TRANSMISSION- NB: PT C/O HEART RACING & FLUTTERING  AP/VS @ 63 PPM ON CURRENT EGM  BATTERY VOLTAGE ADEQUATE (12 2 YRS)  AP-62%, <0 1%  ALL AVAILABLE LEAD PARAMETERS WITHIN NORMAL LIMITS  NO SIGNIFICANT HIGH RATE EPISODES  NORMAL DEVICE FUNCTION  GV     Procedure: Cardiac EP device report    Result Date: 1/4/2023  Narrative: MDT-DUAL CHAMBER PPM (AAIR-DDDR MODE)/ ACTIVE SYSTEM IS MRI CONDITIONAL NB/ARRHYTHMIA CK-CARELINK TRANSMISSION: NO SIGNIFICANT HIGH RATE EPISODES  BATTERY STATUS \"12 YRS  \" AP 46%  0%  ALL AVAILABLE LEAD PARAMETERS WITHIN NORMAL LIMITS  NORMAL DEVICE FUNCTION  NC     Procedure: Cardiac EP device report    Result Date: 12/30/2022  Narrative: MDT-DUAL CHAMBER PPM (AAIR-DDDR MODE)/ ACTIVE SYSTEM IS MRI CONDITIONAL CARELINK TRANSMISSION:  BATTERY VOLTAGE ADEQUATE (12 2YRS)  AP 73%  <1%  ALL LEAD PARAMETERS WITHIN NORMAL LIMITS  NO SIGNIFICANT HIGH RATE EPISODES  NORMAL DEVICE FUNCTION  ---GRACE         Review of Systems   Constitutional: Negative for appetite change, chills, fatigue, fever and unexpected weight change  HENT: Positive for hearing loss (bilateral hearing aids )  Negative for congestion, ear pain, rhinorrhea, sore throat and trouble swallowing  Eyes: Negative for visual disturbance  Respiratory: Negative for cough, shortness of breath and wheezing  Cardiovascular: Negative for chest pain, palpitations and leg swelling          See HPI  05/2022 echo  Left Ventricle: Left " ventricular cavity size is normal  Wall thickness is mildly increased  There is moderate concentric hypertrophy  The left ventricular ejection fraction is 45%  Systolic function is mildly reduced  There is mild global hypokinesis  Right Ventricle: Right ventricular cavity size is mildly dilated  Systolic function is mildly reduced  Left Atrium: The atrium is mildly dilated  Right Atrium: The atrium is mildly dilated  Mitral Valve: There is mild annular calcification  There is mild to moderate regurgitation  Tricuspid Valve: There is moderate regurgitation  The right ventricular systolic pressure is severely elevated  The estimated right ventricular systolic pressure is 70 90 mmHg  Prior  cardio versions  03/2021 admission for atrial fibrillation status post ablation procedure  Pre procedure episode of bradycardia with QT prolongation and torsades successfully defibrillated  Subsequent pacemaker placed  Repeat ablation 08/2022 at [facility]  Gastrointestinal: Negative for abdominal pain, blood in stool, constipation, diarrhea, nausea and vomiting  Colonoscopy 04/2018 Two benign polyps  Mild diverticulosis left-sided colon  Endocrine: Negative for polydipsia and polyuria  12/2020 status post left thyroid lobectomy for left thyroid nodule  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  09/2018 DEXA scan T-score -1 5 left femoral neck  On vitamin D supplement    Lab Results       Component                Value               Date                       [id number]             1 210               01/31/2023                                                 Genitourinary: Negative for difficulty urinating  Musculoskeletal: Negative for arthralgias and myalgias  OA shoulders/ she is using prn Tylenol Arthritis and Tramadol   07/2019 x-rays right shoulder mild degenerative arthritis right AC joint    Left shoulder mild degenerative changes left AC joint  08/2019 bilateral shoulder intra-articular steroid injections via fluoroscopy with symptomatic relief  S/p bilateral TKR  Skin: Negative for rash  Scaly lesions face, nose    Neurological: Positive for tremors  Negative for dizziness and headaches  RLS on Requip  04/2021 neurology evaluation for tremor suspected benign essential  tremor  She is on a Beta-blocker and Gabapentin  Family history + essential tremor brother  Hematological: Negative for adenopathy  Does not bruise/bleed easily  Monoclonal gammopathy  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            Component                Value               Date                       WBC                      5 15                01/31/2023                 HGB                      13 4                01/31/2023                 HCT                      41 0                01/31/2023                 MCV                      95                  01/31/2023                 PLT                      278                 01/31/2023                   IGG       Date                     Value               Ref Range           Status                01/31/2023               905 0               700 0 - 1,600  *     Final                 07/11/2022               960 0               700 0 - 1,600  *     Final                 10/27/2021               793 0               700 0-1,600 0 *     Final                        Lab Results       Component                Value               Date                       [id number]               597                 07/01/2022                          MMA elevated at 489   Psychiatric/Behavioral: Negative for dysphoric mood and sleep disturbance  The patient is nervous/anxious  Stable on low dose Duloxetine 30 mg/day    Chronic insomnia on prn Zolpidem 10 mg                 Past Medical History:   Diagnosis Date   • Actinic keratosis     last assessed - 06Jun2014   • Arthritis    • Atrial fibrillation with rapid ventricular response ([facility] )     last assessed - 26Apr2016   • Basal cell carcinoma    • Benign colon polyp     last assessed - 27Apr2015   • CHF (congestive heart failure) ([facility] ) 06/2022   • Disease of thyroid gland    • Effusion of knee joint right     Resolved - [id number]   • Esophageal reflux    • Fibromyalgia     last assessed - 27Dec2017   • Fibromyalgia, primary    • GERD (gastroesophageal reflux disease)    • Hypertension    • Palpitations     last assessed - 30Apr2013   • Peroneal tendonitis, right     last assessed - 02Oct2013   • Right lumbar radiculopathy 03/17/2016   • Thyroid cancer ([facility] )    • Thyroid nodule    • Trochanteric bursitis of left hip 03/09/2018     Past Surgical History:   Procedure Laterality Date   • CARDIAC ELECTROPHYSIOLOGY STUDY AND ABLATION  08/2022   • CATARACT EXTRACTION Bilateral    • COLONOSCOPY  03/2018   • COLONOSCOPY W/ POLYPECTOMY  2021    repeat in 5 years   • EYE SURGERY     • HYSTERECTOMY     • JOINT REPLACEMENT Left     knee   • GA NEUROPLASTY &/TRANSPOS MEDIAN NRV CARPAL TUNNE Right 11/14/2019    Procedure: RELEASE CARPAL TUNNEL;  Surgeon: Marely Faustin MD;  Location: [facility];  Service: Orthopedics   • GA TOTAL THYROID LOBECTOMY UNI W/WO ISTHMUSECTOMY Left 12/16/2020    Procedure: Left THYROID LOBECTOMY;  Surgeon: Anup Vieira MD;  Location: [facility];  Service: ENT   • RECTAL POLYPECTOMY     • REPLACEMENT TOTAL KNEE Left     last assessed - 27Apr2015   • TONSILLECTOMY     • TOTAL ABDOMINAL HYSTERECTOMY     • TUBAL LIGATION     • US GUIDED THYROID BIOPSY  10/14/2020     Family History   Problem Relation Age of Onset   • Heart disease Mother    • Diabetes Mother    • Heart disease Father    • Coronary artery disease Father    • Stroke Father         cerebrovascular accident   • Heart attack Father         myocardial infarction   • Sudden death Father         scd   • Other Family         Back disorder   • Coronary artery disease Family    • Neuropathy Family    • Osteoporosis Family    • No Known Problems Daughter    • No Known Problems Maternal Grandmother    • No Known Problems Maternal Grandfather    • No Known Problems Paternal Grandmother    • No Known Problems Paternal Grandfather    • Cancer Maternal Uncle    • Breast cancer Maternal Aunt 72   • No Known Problems Son    • No Known Problems Maternal Aunt    • No Known Problems Maternal Aunt    • No Known Problems Maternal Aunt    • No Known Problems Paternal Aunt    • No Known Problems Paternal Aunt    • Anuerysm Neg Hx    • Clotting disorder Neg Hx    • Arrhythmia Neg Hx    • Heart failure Neg Hx      Social History     Socioeconomic History   • Marital status:      Spouse name: Not on file   • Number of children: Not on file   • Years of education: Not on file   • Highest education level: Not on file   Occupational History   • Not on file   Tobacco Use   • Smoking status: Never   • Smokeless tobacco: Never   Vaping Use   • Vaping Use: Never used   Substance and Sexual Activity   • Alcohol use: Not Currently   • Drug use: Never   • Sexual activity: Not Currently   Other Topics Concern   • Not on file   Social History Narrative   • Not on file     Social Determinants of Health     Financial Resource Strain: Low Risk    • Difficulty of Paying Living Expenses: Not hard at all   Food Insecurity: Not on file   Transportation Needs: No Transportation Needs   • Lack of Transportation (Medical): No   • Lack of Transportation (Non-Medical):  No   Physical Activity: Not on file   Stress: Not on file   Social Connections: Not on file   Intimate Partner Violence: Not on file   Housing Stability: Not on file     Current Outpatient Medications on File Prior to Visit   Medication Sig   • amLODIPine (NORVASC) 5 mg tablet Take 1 tablet (5 mg total) by mouth daily   • apixaban (ELIQUIS) 5 mg Take 1 tablet (5 mg total) by mouth 2 (two) times a day   • ascorbic acid (VITAMIN C) 500 mg tablet Take 500 mg by mouth daily    • Cholecalciferol 50 MCG (2000 UT) CAPS Take 2,000 Units by mouth 2 (two) times a day     • Chromium Picolinate (CHROMIUM PICOLATE PO) Take 1 tablet by mouth daily   • DULoxetine (CYMBALTA) 30 mg delayed release capsule take 1 capsule by mouth once daily AFTER BREAKFAST   • ezetimibe (ZETIA) 10 mg tablet Take 1 tablet (10 mg total) by mouth daily   • famotidine (PEPCID) 20 mg tablet Take 20 mg by mouth every other day     • furosemide (LASIX) 20 mg tablet take 1 tablet by mouth daily if needed for shortness of breath WEIGHT GAIN 3 LBS IN 1 DAY OR LOWER LEG SWELLING   • gabapentin (NEURONTIN) 300 mg capsule Take 1 capsule (300 mg total) by mouth daily at bedtime   • ipratropium (ATROVENT) 0 03 % nasal spray 2 sprays into each nostril 3 (three) times a day Begin once per day, then progress to twice per day  Progress to three times a day as tolerated     • lidocaine (XYLOCAINE) 5 % ointment Apply topically as needed for mild pain   • losartan (COZAAR) 50 mg tablet Take 1 tablet (50 mg total) by mouth 2 (two) times a day   • metoprolol succinate (TOPROL-XL) 25 mg 24 hr tablet Take 3 tablets (75 mg total) by mouth every 12 (twelve) hours   • pantoprazole (PROTONIX) 20 mg tablet Take 1 tablet by mouth daily before breakfast   • rOPINIRole (REQUIP) 0 5 mg tablet Take 1 tablet (0 5 mg total) by mouth daily at bedtime   • TURMERIC PO Take 1 capsule by mouth 2 (two) times a day   • zolpidem (AMBIEN) 10 mg tablet Take 1 tablet (10 mg total) by mouth daily at bedtime as needed for sleep   • [DISCONTINUED] traMADol (ULTRAM) 50 mg tablet Take 1 tablet (50 mg total) by mouth 2 (two) times a day as needed for moderate pain     Allergies   Allergen Reactions   • Sotalol Other (See Comments)     Prolonged QTc leading to torsades de pointes    • Penicillins Other (See Comments)     As a child calcium deposit in the arm    • Ace Inhibitors GI Intolerance     Did feel well on it   • Omeprazole Abdominal Pain, Rash and "Vomiting     stomach pain, vomiting, rash     Immunization History   Administered Date(s) Administered   • COVID-19 MODERNA VACC 0 5 ML IM 01/27/2021, 02/24/2021   • INFLUENZA 12/23/2015, 11/07/2016, 10/18/2017, 12/04/2018, 09/19/2022   • Influenza Split High Dose Preservative Free IM 10/01/2014, 12/23/2015, 11/07/2016, 10/18/2017   • Influenza, high dose seasonal 0 7 mL 12/04/2018, 09/26/2019, 09/08/2020, 11/03/2021, 09/19/2022   • Influenza, seasonal, injectable 10/16/2012   • Pneumococcal Conjugate 13-Valent 04/27/2015   • Pneumococcal Polysaccharide PPV23 03/29/2022       Objective     /80 (BP Location: Left arm, Patient Position: Sitting, Cuff Size: Large)   Pulse 70   Temp (!) 97 2 °F (36 2 °C)   Ht 5' 1 5\" (1 562 m)   Wt 75 kg (165 lb 6 4 oz)   LMP 02/01/1990 (Within Weeks)   BMI 30 75 kg/m²     Physical Exam  Cardiovascular:      Pulses:           Dorsalis pedis pulses are 2+ on the right side and 2+ on the left side  Posterior tibial pulses are 2+ on the right side and 2+ on the left side  Comments: No calf swelling or tenderness  Negative Homans sign  Musculoskeletal:         General: Tenderness present  Right lower leg: No edema  Left lower leg: No edema  Comments: Full extension L and R knee  90 degrees flexion L knee  Mild tenderness L anserine bursa  Bilateral tenderness along both shins  11/18 muscular tender points consistent with fibromyalgia  Skin:     Findings: Lesion present  No rash  Neurological:      Motor: No weakness        Gait: Gait normal       Deep Tendon Reflexes: Reflexes normal    Psychiatric:         Mood and Affect: Mood normal          Behavior: Behavior normal        Ilda Garcia MD  "

## 2023-03-31 ENCOUNTER — REMOTE DEVICE CLINIC VISIT (OUTPATIENT)
Dept: CARDIOLOGY CLINIC | Facility: CLINIC | Age: 78
End: 2023-03-31

## 2023-03-31 DIAGNOSIS — M17.11 PRIMARY OSTEOARTHRITIS OF RIGHT KNEE: ICD-10-CM

## 2023-03-31 DIAGNOSIS — Z95.0 PRESENCE OF PERMANENT CARDIAC PACEMAKER: Primary | ICD-10-CM

## 2023-03-31 RX ORDER — TRAMADOL HYDROCHLORIDE 50 MG/1
50 TABLET ORAL 2 TIMES DAILY PRN
Qty: 60 TABLET | Refills: 3 | Status: SHIPPED | OUTPATIENT
Start: 2023-03-31

## 2023-03-31 NOTE — PROGRESS NOTES
MDT-DUAL CHAMBER PPM (AAIR-DDDR MODE)/ ACTIVE SYSTEM IS MRI CONDITIONAL   CARELINK TRANSMISSION:  BATTERY VOLTAGE ADEQUATE (12 0 YR )   AP 51 1%  <0 1%    ALL LEAD PARAMETERS WITHIN NORMAL LIMITS   NO SIGNIFICANT HIGH RATE EPISODES   NORMAL DEVICE FUNCTION  Millie Jorge

## 2023-03-31 NOTE — TELEPHONE ENCOUNTER
1  [id number] 06/10/2022  06/10/2022 traMADol HCL (Tablet)  60 0 30 50 MG  5 0 [address] [place] 0 / 3 [place]    1  [id number] 05/09/2022 02/11/2022 traMADol HCL (Tablet)  60 0 30 50 MG  5 0 [address]  [employer]  Medicare 02 / 3 [place]          The office received documentation that Tramadol is unavailable at [facility] but it is available at [place] [id number]  Contacted patient to inform her and she agreed that tramadol can be sent to [facility]  Patient also informed that Dr Andie Honeycutt is out of office today 3/31/2023 and will be back on Monday, she has some medication to get her through the weekend

## 2023-04-03 ENCOUNTER — TELEPHONE (OUTPATIENT)
Dept: CARDIOLOGY CLINIC | Facility: CLINIC | Age: 78
End: 2023-04-03

## 2023-04-06 DIAGNOSIS — I48.0 PAROXYSMAL ATRIAL FIBRILLATION (HCC): ICD-10-CM

## 2023-04-19 NOTE — PROGRESS NOTES
Reviewed 64 year old woman with a PMHx of ESRD on HD (T,T,S), HTN, pre DM, Multiple myeloma and ? catheter clot (on eliquis).   Patient presented to the ED with complaints of 1 day of nausea, vomiting, chills and diarrhea. Also having pain in the left thigh area and right shoulder. In the ED, temp 101.6. Labs significant for wbc 3, plt 74, lactate 7.8. Pt got CT scan of the left leg, chest and abdomen (results pending). She got total 2 L of IV fluids with no improvement of lactate level. Patient became hypotensive and ICU was consulted for further management.   4/19 0600 patient cardiac arrested X 3 with ROSC, emergent HD with improvement in PH/ acidosis  4/19 1703 patient noted to have rhythm change, CODE BLUE ACLS performed with brief episodes of ROSC unable to sustain,   Time of death 1735. Family at bedside

## 2023-04-27 DIAGNOSIS — G25.81 RLS (RESTLESS LEGS SYNDROME): ICD-10-CM

## 2023-04-27 RX ORDER — ROPINIROLE 0.5 MG/1
0.5 TABLET, FILM COATED ORAL
Qty: 90 TABLET | Refills: 3 | Status: SHIPPED | OUTPATIENT
Start: 2023-04-27

## 2023-04-28 ENCOUNTER — OFFICE VISIT (OUTPATIENT)
Dept: OBGYN CLINIC | Facility: MEDICAL CENTER | Age: 78
End: 2023-04-28

## 2023-04-28 ENCOUNTER — APPOINTMENT (OUTPATIENT)
Dept: LAB | Facility: MEDICAL CENTER | Age: 78
End: 2023-04-28

## 2023-04-28 VITALS
HEIGHT: 62 IN | HEART RATE: 66 BPM | WEIGHT: 165 LBS | DIASTOLIC BLOOD PRESSURE: 71 MMHG | BODY MASS INDEX: 30.36 KG/M2 | SYSTOLIC BLOOD PRESSURE: 125 MMHG

## 2023-04-28 DIAGNOSIS — T84.033D LOOSENING OF PROSTHESIS OF LEFT TOTAL KNEE REPLACEMENT, SUBSEQUENT ENCOUNTER: ICD-10-CM

## 2023-04-28 DIAGNOSIS — M25.362 KNEE INSTABILITY, LEFT: ICD-10-CM

## 2023-04-28 DIAGNOSIS — G89.29 CHRONIC PAIN OF LEFT KNEE: ICD-10-CM

## 2023-04-28 DIAGNOSIS — M25.562 CHRONIC PAIN OF LEFT KNEE: ICD-10-CM

## 2023-04-28 DIAGNOSIS — M70.62 TROCHANTERIC BURSITIS OF LEFT HIP: ICD-10-CM

## 2023-04-28 DIAGNOSIS — Z96.652 HISTORY OF TOTAL KNEE REPLACEMENT, LEFT: Primary | ICD-10-CM

## 2023-04-28 DIAGNOSIS — Z96.652 HISTORY OF TOTAL KNEE REPLACEMENT, LEFT: ICD-10-CM

## 2023-04-28 DIAGNOSIS — R29.898 WEAKNESS OF LEFT LOWER EXTREMITY: ICD-10-CM

## 2023-04-28 LAB
BASOPHILS # BLD AUTO: 0.07 THOUSANDS/ΜL (ref 0–0.1)
BASOPHILS NFR BLD AUTO: 1 % (ref 0–1)
CRP SERPL QL: 6.1 MG/L
EOSINOPHIL # BLD AUTO: 0.18 THOUSAND/ΜL (ref 0–0.61)
EOSINOPHIL NFR BLD AUTO: 4 % (ref 0–6)
ERYTHROCYTE [DISTWIDTH] IN BLOOD BY AUTOMATED COUNT: 13.2 % (ref 11.6–15.1)
ERYTHROCYTE [SEDIMENTATION RATE] IN BLOOD: 56 MM/HOUR (ref 0–29)
HCT VFR BLD AUTO: 40.3 % (ref 34.8–46.1)
HGB BLD-MCNC: 12.9 G/DL (ref 11.5–15.4)
IMM GRANULOCYTES # BLD AUTO: 0.02 THOUSAND/UL (ref 0–0.2)
IMM GRANULOCYTES NFR BLD AUTO: 0 % (ref 0–2)
LYMPHOCYTES # BLD AUTO: 1.39 THOUSANDS/ΜL (ref 0.6–4.47)
LYMPHOCYTES NFR BLD AUTO: 27 % (ref 14–44)
MCH RBC QN AUTO: 30.7 PG (ref 26.8–34.3)
MCHC RBC AUTO-ENTMCNC: 32 G/DL (ref 31.4–37.4)
MCV RBC AUTO: 96 FL (ref 82–98)
MONOCYTES # BLD AUTO: 0.62 THOUSAND/ΜL (ref 0.17–1.22)
MONOCYTES NFR BLD AUTO: 12 % (ref 4–12)
NEUTROPHILS # BLD AUTO: 2.91 THOUSANDS/ΜL (ref 1.85–7.62)
NEUTS SEG NFR BLD AUTO: 56 % (ref 43–75)
NRBC BLD AUTO-RTO: 0 /100 WBCS
PLATELET # BLD AUTO: 335 THOUSANDS/UL (ref 149–390)
PMV BLD AUTO: 9.9 FL (ref 8.9–12.7)
RBC # BLD AUTO: 4.2 MILLION/UL (ref 3.81–5.12)
WBC # BLD AUTO: 5.19 THOUSAND/UL (ref 4.31–10.16)

## 2023-04-28 RX ORDER — LIDOCAINE HYDROCHLORIDE 10 MG/ML
2 INJECTION, SOLUTION INFILTRATION; PERINEURAL
Status: COMPLETED | OUTPATIENT
Start: 2023-04-28 | End: 2023-04-28

## 2023-04-28 RX ORDER — BETAMETHASONE SODIUM PHOSPHATE AND BETAMETHASONE ACETATE 3; 3 MG/ML; MG/ML
12 INJECTION, SUSPENSION INTRA-ARTICULAR; INTRALESIONAL; INTRAMUSCULAR; SOFT TISSUE
Status: COMPLETED | OUTPATIENT
Start: 2023-04-28 | End: 2023-04-28

## 2023-04-28 RX ORDER — BUPIVACAINE HYDROCHLORIDE 2.5 MG/ML
2 INJECTION, SOLUTION INFILTRATION; PERINEURAL
Status: COMPLETED | OUTPATIENT
Start: 2023-04-28 | End: 2023-04-28

## 2023-04-28 RX ADMIN — BUPIVACAINE HYDROCHLORIDE 2 ML: 2.5 INJECTION, SOLUTION INFILTRATION; PERINEURAL at 08:56

## 2023-04-28 RX ADMIN — LIDOCAINE HYDROCHLORIDE 2 ML: 10 INJECTION, SOLUTION INFILTRATION; PERINEURAL at 08:56

## 2023-04-28 RX ADMIN — BETAMETHASONE SODIUM PHOSPHATE AND BETAMETHASONE ACETATE 12 MG: 3; 3 INJECTION, SUSPENSION INTRA-ARTICULAR; INTRALESIONAL; INTRAMUSCULAR; SOFT TISSUE at 08:56

## 2023-04-28 NOTE — PROGRESS NOTES
Assessment:   Diagnosis ICD-10-CM Associated Orders   1  History of total knee replacement, left  Z96 652 Ambulatory Referral to Physical Therapy     CBC and differential     C-reactive protein     Sedimentation rate, automated      2  Knee instability, left  M25 362 Ambulatory Referral to Physical Therapy     CBC and differential     C-reactive protein     Sedimentation rate, automated      3  Chronic pain of left knee  M25 562 CBC and differential    G89 29 C-reactive protein     Sedimentation rate, automated      4  Trochanteric bursitis of left hip  M70 62 Large joint arthrocentesis: L greater trochanteric bursa     Ambulatory Referral to Physical Therapy      5  Weakness of left lower extremity  R29 898 Ambulatory Referral to Physical Therapy      6  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, subsequent encounter  T84 033D CBC and differential     C-reactive protein     Sedimentation rate, automated          Plan:  • Diagnosis, treatment options and associated risks were discussed with the patient including no treatment, nonsurgical treatment and potential for surgical intervention  The patient was given the opportunity to ask questions regarding each  • She was offered, excepted, formed injection of cortisone to her left hip trochanteric bursa area today for symptomatic relief  She tolerated the procedure well  • Ice and postinjection protocol advised  • Weightbearing and activities as tolerated  • Recommend a course of physical therapy for strengthening of her left lower extremity  • Strong consideration for revision left knee replacement surgery to include tibial component revision  • We will send for basic labs to rule out infection/inflammatory markers (CBC, ESR, CRP)    To do next visit:  Return in about 6 weeks (around 6/9/2023) for re-check  The above stated was discussed in layman's terms and the patient expressed understanding    All questions were answered to the patient's satisfaction  Scribe Attestation    I,:  Mirta Laguna am acting as a scribe while in the presence of the attending physician :       I,:  Hoa Umanzor MD personally performed the services described in this documentation    as scribed in my presence :             Subjective:   Kishan Aponte is a 65 y o  female who presents with her daughter return today earlier than previously scheduled due to increased pain at her left leg  She has a history of a Left TKA with loosening (unbonding) of her tibial component  She was seen early in March after a fall resulting in an MCL sprain  She had a Pes anserine bursa injection with minimal relief  She presents in a transport chair for transportation ease but has a cane at the ready  She woke up this past Sunday with increased pain, swelling and weakness at her left lower extremity  She has pain laterally at her left hip that radiates to just the level of her knee but not distally  She does have chronic lower back issues however states that her back is otherwise asymptomatic at this time        Review of systems negative unless otherwise specified in HPI  Review of Systems    Past Medical History:   Diagnosis Date   • Actinic keratosis     last assessed - [id number]   • Arthritis    • Atrial fibrillation with rapid ventricular response ([facility] [address] )     last assessed - 26Apr2016   • Basal cell carcinoma    • Benign colon polyp     last assessed - 27Apr2015   • CHF (congestive heart failure) ([address] ) 06/2022   • Disease of thyroid gland    • Effusion of knee joint right     Resolved - [id number]   • Esophageal reflux    • Fibromyalgia     last assessed - [id number]   • Fibromyalgia, primary    • GERD (gastroesophageal reflux disease)    • Hypertension    • Palpitations     last assessed - 30Apr2013   • Peroneal tendonitis, right     last assessed - [id number]   • Right lumbar radiculopathy 03/17/2016   • Thyroid cancer ([facility] [address] )    • Thyroid nodule    • Trochanteric bursitis of left hip 03/09/2018       Past Surgical History:   Procedure Laterality Date   • CARDIAC ELECTROPHYSIOLOGY STUDY AND ABLATION  08/2022   • CATARACT EXTRACTION Bilateral    • COLONOSCOPY  03/2018   • COLONOSCOPY W/ POLYPECTOMY  2021    repeat in 5 years   • EYE SURGERY     • HYSTERECTOMY     • JOINT REPLACEMENT Left     knee   • SD NEUROPLASTY &/TRANSPOS MEDIAN NRV CARPAL TUNNE Right 11/14/2019    Procedure: RELEASE CARPAL TUNNEL;  Surgeon: Thelma Klein MD;  [facility]: [facility];  Service: Orthopedics   • SD TOTAL THYROID LOBECTOMY UNI W/WO ISTHMUSECTOMY Left 12/16/2020    Procedure: Left THYROID LOBECTOMY;  Surgeon: Filomena Cormier MD;  Location: [facility];  Service: ENT   • RECTAL POLYPECTOMY     • REPLACEMENT TOTAL KNEE Left     last assessed - 27Apr2015   • TONSILLECTOMY     • TOTAL ABDOMINAL HYSTERECTOMY     • TUBAL LIGATION     • US GUIDED THYROID BIOPSY  10/14/2020       Family History   Problem Relation Age of Onset   • Heart disease Mother    • Diabetes Mother    • Heart disease Father    • Coronary artery disease Father    • Stroke Father         cerebrovascular accident   • Heart attack Father         myocardial infarction   • Sudden death Father         scd   • Other Family         Back disorder   • Coronary artery disease Family    • Neuropathy Family    • Osteoporosis Family    • No Known Problems Daughter    • No Known Problems Maternal Grandmother    • No Known Problems Maternal Grandfather    • No Known Problems Paternal Grandmother    • No Known Problems Paternal Grandfather    • Cancer Maternal Uncle    • Breast cancer Maternal Aunt 72   • No Known Problems Son    • No Known Problems Maternal Aunt    • No Known Problems Maternal Aunt    • No Known Problems Maternal Aunt    • No Known Problems Paternal Aunt    • No Known Problems Paternal Aunt    • Anuerysm Neg Hx    • Clotting disorder Neg Hx    • Arrhythmia Neg Hx    • Heart failure Neg Hx        Social History     Occupational History   • Not on file   Tobacco Use   • Smoking status: Never   • Smokeless tobacco: Never   Vaping Use   • Vaping Use: Never used   Substance and Sexual Activity   • Alcohol use: Not Currently   • Drug use: Never   • Sexual activity: Not Currently         Current Outpatient Medications:   •  amLODIPine (NORVASC) 5 mg tablet, Take 1 tablet (5 mg total) by mouth daily, Disp: 90 tablet, Rfl: 3  •  apixaban (ELIQUIS) 5 mg, Take 1 tablet (5 mg total) by mouth 2 (two) times a day, Disp: 60 tablet, Rfl: 11  •  ascorbic acid (VITAMIN C) 500 mg tablet, Take 500 mg by mouth daily , Disp: , Rfl:   •  Cholecalciferol 50 MCG (2000 UT) CAPS, Take 2,000 Units by mouth 2 (two) times a day  , Disp: , Rfl:   •  Chromium Picolinate (CHROMIUM PICOLATE PO), Take 1 tablet by mouth daily, Disp: , Rfl:   •  DULoxetine (CYMBALTA) 30 mg delayed release capsule, Take 1 capsule (30 mg total) by mouth daily, Disp: 30 capsule, Rfl: 2  •  ezetimibe (ZETIA) 10 mg tablet, Take 1 tablet (10 mg total) by mouth daily, Disp: 90 tablet, Rfl: 3  •  famotidine (PEPCID) 20 mg tablet, Take 20 mg by mouth every other day  , Disp: , Rfl:   •  furosemide (LASIX) 20 mg tablet, take 1 tablet by mouth daily if needed for shortness of breath WEIGHT GAIN 3 LBS IN 1 DAY OR LOWER LEG SWELLING, Disp: 10 tablet, Rfl: 3  •  gabapentin (NEURONTIN) 300 mg capsule, Take 1 capsule (300 mg total) by mouth daily at bedtime, Disp: 30 capsule, Rfl: 5  •  ipratropium (ATROVENT) 0 03 % nasal spray, 2 sprays into each nostril 3 (three) times a day Begin once per day, then progress to twice per day  Progress to three times a day as tolerated  , Disp: 90 mL, Rfl: 3  •  lidocaine (XYLOCAINE) 5 % ointment, Apply topically as needed for mild pain, Disp: 35 44 g, Rfl: 0  •  losartan (COZAAR) 50 mg tablet, Take 1 tablet (50 mg total) by mouth 2 (two) times a day, Disp: 180 tablet, Rfl: 3  •  pantoprazole (PROTONIX) 20 mg tablet, Take 1 tablet by mouth daily before breakfast, Disp: , Rfl:   •  rOPINIRole (REQUIP) 0 5 mg tablet, Take 1 tablet (0 5 mg total) by mouth daily at bedtime, Disp: 90 tablet, Rfl: 3  •  traMADol (ULTRAM) 50 mg tablet, Take 1 tablet (50 mg total) by mouth 2 (two) times a day as needed for moderate pain, Disp: 60 tablet, Rfl: 3  •  TURMERIC PO, Take 1 capsule by mouth 2 (two) times a day, Disp: , Rfl:   •  zolpidem (AMBIEN) 10 mg tablet, Take 1 tablet (10 mg total) by mouth daily at bedtime as needed for sleep, Disp: 90 tablet, Rfl: 1  •  metoprolol succinate (TOPROL-XL) 25 mg 24 hr tablet, Take 3 tablets (75 mg total) by mouth every 12 (twelve) hours, Disp: 540 tablet, Rfl: 3    Allergies   Allergen Reactions   • Sotalol Other (See Comments)     Prolonged QTc leading to torsades de pointes    • Penicillins Other (See Comments)     As a child calcium deposit in the arm    • Ace Inhibitors GI Intolerance     Did feel well on it   • Omeprazole Abdominal Pain, Rash and Vomiting     stomach pain, vomiting, rash            Vitals:    04/28/23 0833   BP: 125/71   Pulse: 66       Objective:                    Left Knee Exam     Tenderness   Left knee tenderness location: Diffusely throughout  Range of Motion   Extension: 0   Flexion: 110 (patella tracks well)     Other   Erythema: absent  Sensation: normal  Swelling: mild  Effusion: effusion present    Comments:    Intact extensor mechanism  Healed anterior incision  Mild laxity with varus/valgus stressing in both extension and mid-flexion with pain      Left Hip Exam     Tenderness   The patient is experiencing tenderness in the greater trochanter and lateral     Other   Erythema: absent  Sensation: normal    Comments:    Painless arc of gentle passive range of motion at her hip  Diagnostics, reviewed and taken today if performed as documented:    None performed but reviewed:       Procedures, if performed today:    Large joint arthrocentesis: L greater trochanteric bursa  Universal Protocol:  Consent: Verbal consent obtained    Risks "and benefits: risks, benefits and alternatives were discussed  Consent given by: patient  Time out: Immediately prior to procedure a \"time out\" was called to verify the correct patient, procedure, equipment, support staff and site/side marked as required  Timeout called at: 4/28/2023 8:52 AM   Patient understanding: patient states understanding of the procedure being performed  Site marked: the operative site was marked  Patient identity confirmed: verbally with patient    Supporting Documentation  Indications: pain and diagnostic evaluation   Procedure Details  Location: hip - L greater trochanteric bursa  Preparation: Patient was prepped and draped in the usual sterile fashion  Needle size: 22 G  Ultrasound guidance: no  Approach: lateral  Medications administered: 2 mL bupivacaine 0 25 %; 2 mL lidocaine 1 %; 12 mg betamethasone acetate-betamethasone sodium phosphate 6 (3-3) mg/mL    Patient tolerance: patient tolerated the procedure well with no immediate complications  Dressing:  Sterile dressing applied          Portions of the record may have been created with voice recognition software  Occasional wrong word or \"sound a like\" substitutions may have occurred due to the inherent limitations of voice recognition software  Read the chart carefully and recognize, using context, where substitutions have occurred    "

## 2023-04-28 NOTE — PATIENT INSTRUCTIONS
1  History of total knee replacement, left  Ambulatory Referral to Physical Therapy    CBC and differential    C-reactive protein    Sedimentation rate, automated      2  Knee instability, left  Ambulatory Referral to Physical Therapy    CBC and differential    C-reactive protein    Sedimentation rate, automated      3  Chronic pain of left knee  CBC and differential    C-reactive protein    Sedimentation rate, automated      4  Trochanteric bursitis of left hip  Large joint arthrocentesis: L greater trochanteric bursa    Ambulatory Referral to Physical Therapy      5  Weakness of left lower extremity  Ambulatory Referral to Physical Therapy      6  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

## 2023-05-08 DIAGNOSIS — I48.0 PAROXYSMAL ATRIAL FIBRILLATION (HCC): ICD-10-CM

## 2023-05-09 ENCOUNTER — PROCEDURE VISIT (OUTPATIENT)
Dept: FAMILY MEDICINE CLINIC | Facility: CLINIC | Age: 78
End: 2023-05-09

## 2023-05-09 DIAGNOSIS — M79.18 MYOFASCIAL PAIN SYNDROME: Primary | ICD-10-CM

## 2023-05-09 NOTE — PROGRESS NOTES
Universal Protocol:  Consent: Verbal consent obtained  Risks and benefits: risks, benefits and alternatives were discussed  Consent given by: patient    Supporting Documentation  Indications: pain (Myofascial pain syndrome-IT ban syndrome  )   Trigger Point Injections: single/multiple trigger point(s): 1-2 muscle groups    Injection site identified by: palpation    Procedure Details  Location(s):    Prep: patient was prepped and draped in usual sterile fashion  Needle size: 32 G  Patient tolerance: patient tolerated the procedure well with no immediate complications  Additional procedure details: Using aseptic technique I injected 6 areas along the length of the patient's left IT band/left lateral thigh   Sarapin 12 ML and Lidocaine 1% 6 ML         Diagnoses and all orders for this visit:    Myofascial pain syndrome  -     Trigger Point Injection  -     sarapin injection 12 mL      Recheck as needed

## 2023-05-10 ENCOUNTER — EVALUATION (OUTPATIENT)
Dept: PHYSICAL THERAPY | Facility: MEDICAL CENTER | Age: 78
End: 2023-05-10

## 2023-05-10 DIAGNOSIS — M70.62 TROCHANTERIC BURSITIS OF LEFT HIP: ICD-10-CM

## 2023-05-10 DIAGNOSIS — Z96.652 HISTORY OF TOTAL KNEE REPLACEMENT, LEFT: ICD-10-CM

## 2023-05-10 DIAGNOSIS — M25.362 KNEE INSTABILITY, LEFT: ICD-10-CM

## 2023-05-10 DIAGNOSIS — M76.32 ILIOTIBIAL BAND SYNDROME OF LEFT SIDE: ICD-10-CM

## 2023-05-10 DIAGNOSIS — R29.898 WEAKNESS OF LEFT LOWER EXTREMITY: ICD-10-CM

## 2023-05-10 DIAGNOSIS — M25.562 PAIN IN LATERAL PORTION OF LEFT KNEE: Primary | ICD-10-CM

## 2023-05-10 NOTE — PROGRESS NOTES
PT Evaluation     Today's date: 5/10/2023  Patient name: Conrado Way  : 1945  MRN: [id number]  Referring provider: Anthony Mendez  Dx:   Encounter Diagnosis     ICD-10-CM    1  Pain in lateral portion of left knee  M25 562       2  Iliotibial band syndrome of left side  M76 32       3  History of total knee replacement, left  Z96 652 Ambulatory Referral to Physical Therapy      4  Knee instability, left  M25 362 Ambulatory Referral to Physical Therapy      5  Trochanteric bursitis of left hip  M70 62 Ambulatory Referral to Physical Therapy      6  Weakness of left lower extremity  R29 898 Ambulatory Referral to Physical Therapy                     Assessment  Assessment details: Pt is an alert and oriented 64 yo female referred to out-pt PT with dx of L knee pain and ITB syndrome  Pt states having pain >2-3 years, and does have a L TKA that is 14yo and has loosening of the tibial compartment  Pt has had several trigger point injections and one injection into L greater troch  Pt also has PMH of Myofascial pain syndrome  Pt states most pain occurs with walking, sitting, and with rolling/laying on her hip in bed  Upon examination, pt demonstrates pain, tissue tenderness, decreased L knee and hip strength and ROM, gait deficits, functional limitations and postural deficits  Pt will benefit from skilled PT to address the deficits listed above and maximize function    Thank you for your referral      Impairments: abnormal gait, abnormal muscle firing, abnormal muscle tone, abnormal or restricted ROM, activity intolerance, impaired balance, impaired physical strength, lacks appropriate home exercise program, pain with function and weight-bearing intolerance  Understanding of Dx/Px/POC: good   Prognosis: good    Goals  ST: Decrease pain by 25% in 4 weeks with all functional activities  2: Improved ROM by 25% in 4 weeks to assist with all functional activities  3: Improve strength by 1/2 grade in 4 weeks to assist with all functional activities  LT:  Decrease pain to 0-1/10 with all functional activities in 4-6 weeks  2: Improved ROM to WFL's in 4-6 weeks to assist with all functional activities  3:  Increase strength to WFL's in 4-6 weeks to assist with all functional activities    Plan  Patient would benefit from: skilled physical therapy  Planned modality interventions: cryotherapy  Planned therapy interventions: joint mobilization, manual therapy, balance, balance/weight bearing training, neuromuscular re-education, patient education, strengthening, stretching, therapeutic activities, therapeutic exercise and home exercise program  Frequency: 2x week  Duration in weeks: 6  Plan of Care beginning date: 5/10/2023  Plan of Care expiration date: 2023  Treatment plan discussed with: patient        Subjective Evaluation    Pain  Current pain ratin  At best pain ratin  At worst pain ratin  Quality: dull ache  Relieving factors: ice and medications  Aggravating factors: standing, walking, stair climbing and sitting    Social Support  Steps to enter house: yes  3  Stairs in house: no   Lives in: [facility]      Diagnostic Tests  X-ray: abnormal  Treatments  Current treatment: physical therapy  Patient Goals  Patient goals for therapy: decreased pain, improved balance, increased motion, independence with ADLs/IADLs and increased strength          Objective     Observations     Additional Observation Details  Posture/observation:  66 yo female, small frame, L pelvis appears elevated in standing    Gait:  Pt amb ind with SPC, antalgic gait, decreased step length and stance time on L    Palpation     Additional Palpation Details  Palpation:  Pain/tenderness L lat hip, greater troch, and ITB    Neurological Testing     Sensation     Hip   Left Hip   Paresthesia: light touch    Right Hip   Paresthesia: light touch    Active Range of Motion   Left Hip   Flexion: 102 degrees with pain  Extension: 10 degrees   Abduction: 20 degrees     Strength/Myotome Testing     Left Hip   Planes of Motion   Flexion: 3+  Extension: 3+  Abduction: 3+  Adduction: 3+    Tests     Left Hip   Positive long sit  Negative Jacqueline       Additional Tests Details  (=) long-sit for L post pelvic rot             Precautions: FM, CHF, DM, h/o thyroid CA, GERD, depression, CKD, HTN, L TKA      Manuals 5/10            MFR foam roller L ITB nv            MET for L post rot BG                                      Neuro Re-Ed                                                                                                        Ther Ex 5/10            ppt             bridges             Hip add iso             TB hip abd             clamshells             dls marches             LTR                          Ther Activity                                       Gait Training                                       Modalities 5/10            CP x1q0'

## 2023-05-11 ENCOUNTER — TELEPHONE (OUTPATIENT)
Dept: OBGYN CLINIC | Facility: HOSPITAL | Age: 78
End: 2023-05-11

## 2023-05-11 NOTE — TELEPHONE ENCOUNTER
Caller: David Monahan    Doctor: Nakia Chase    Reason for call: Checking to see the last time she saw Dr Tate    Call back#: [phone]

## 2023-05-15 ENCOUNTER — OFFICE VISIT (OUTPATIENT)
Dept: PHYSICAL THERAPY | Facility: MEDICAL CENTER | Age: 78
End: 2023-05-15

## 2023-05-15 DIAGNOSIS — M25.362 KNEE INSTABILITY, LEFT: ICD-10-CM

## 2023-05-15 DIAGNOSIS — R29.898 WEAKNESS OF LEFT LOWER EXTREMITY: ICD-10-CM

## 2023-05-15 DIAGNOSIS — M70.62 TROCHANTERIC BURSITIS OF LEFT HIP: ICD-10-CM

## 2023-05-15 DIAGNOSIS — M25.562 PAIN IN LATERAL PORTION OF LEFT KNEE: Primary | ICD-10-CM

## 2023-05-15 DIAGNOSIS — M76.32 ILIOTIBIAL BAND SYNDROME OF LEFT SIDE: ICD-10-CM

## 2023-05-15 DIAGNOSIS — Z96.652 HISTORY OF TOTAL KNEE REPLACEMENT, LEFT: ICD-10-CM

## 2023-05-15 NOTE — PROGRESS NOTES
"Daily Note     Today's date: 5/15/2023  Patient name: Geovanna Solomon  : 1945  MRN: [id number]  Referring provider: Keyana Reina  Dx:   Encounter Diagnosis     ICD-10-CM    1  Pain in lateral portion of left knee  M25 562       2  Iliotibial band syndrome of left side  M76 32       3  Trochanteric bursitis of left hip  M70 62       4  Weakness of left lower extremity  R29 898       5  History of total knee replacement, left  Z96 652       6  Knee instability, left  M25 362                      Subjective:  Pt states her hip felt slightly better with decreased pain since IE  [place] [facility] [place] and didn't have any sig increase in pain post prolonged sitting  Objective: See treatment diary below    (+) long -sit test persists for L post rot, corrected with MET      Assessment: Tolerated treatment well  Patient demonstrated fatigue post treatment and would benefit from continued PT  Pt unable to fully bend L knee due to loosening of L knee implant  Pt also has most discomfort in knee with TE, making strengthening difficult  Plan: Continue per plan of care  Precautions: FM, CHF, DM, h/o thyroid CA, GERD, depression, CKD, HTN, L TKA      Manuals 5/10 5/15           MFR foam roller L ITB nv BG           MET for L post rot BG BG                                     Neuro Re-Ed                                                                                                        Ther Ex 5/10 5/15           ppt  5\"x20           bridges  unable P! Hip add iso  5\"x20           TB hip abd  GTB 5\"x20           clamshells  5\"x20           dls marches  [id number]           LTR  unable P!            S/l hip abd  2x10           ITB stretch  nv                                     Gait Training                                       Modalities 5/10 5/15           CP x10' x10'                             "

## 2023-05-16 DIAGNOSIS — I48.0 PAROXYSMAL ATRIAL FIBRILLATION (HCC): ICD-10-CM

## 2023-05-17 ENCOUNTER — APPOINTMENT (OUTPATIENT)
Dept: PHYSICAL THERAPY | Facility: MEDICAL CENTER | Age: 78
End: 2023-05-17
Payer: MEDICARE

## 2023-05-17 ENCOUNTER — OFFICE VISIT (OUTPATIENT)
Dept: PHYSICAL THERAPY | Facility: MEDICAL CENTER | Age: 78
End: 2023-05-17

## 2023-05-17 DIAGNOSIS — R29.898 WEAKNESS OF LEFT LOWER EXTREMITY: ICD-10-CM

## 2023-05-17 DIAGNOSIS — M25.562 PAIN IN LATERAL PORTION OF LEFT KNEE: Primary | ICD-10-CM

## 2023-05-17 DIAGNOSIS — Z96.652 HISTORY OF TOTAL KNEE REPLACEMENT, LEFT: ICD-10-CM

## 2023-05-17 DIAGNOSIS — M70.62 TROCHANTERIC BURSITIS OF LEFT HIP: ICD-10-CM

## 2023-05-17 DIAGNOSIS — M25.362 KNEE INSTABILITY, LEFT: ICD-10-CM

## 2023-05-17 DIAGNOSIS — M76.32 ILIOTIBIAL BAND SYNDROME OF LEFT SIDE: ICD-10-CM

## 2023-05-17 NOTE — PROGRESS NOTES
"Daily Note     Today's date: 2023  Patient name: Abran Pope  : 1945  MRN: [id number]  Referring provider: Aly Smith  Dx:   Encounter Diagnosis     ICD-10-CM    1  Pain in lateral portion of left knee  M25 562       2  Iliotibial band syndrome of left side  M76 32       3  History of total knee replacement, left  Z96 652       4  Knee instability, left  M25 362       5  Trochanteric bursitis of left hip  M70 62       6  Weakness of left lower extremity  R29 898                      Subjective:  Pt states her hip cont to feel improved with decreased pain, however her knees are still very stiff and painful and states all morning for her to get up and walk around before they \"loosen up\"  Objective: See treatment diary below    (-) long -sit test persists for L post rot      Assessment: Tolerated treatment fair  Patient demonstrated fatigue post treatment and would benefit from continued PT  Pt appears in good alignment today, no MET required  Pt unable to mona may exercises due to knee pain and inability to bend L knee to maintain foot flat on table  Plan: Continue per plan of care  Precautions: FM, CHF, DM, h/o thyroid CA, GERD, depression, CKD, HTN, L TKA      Manuals 5/10 5/15 5/17          MFR foam roller L ITB nv BG BG          MET for L post rot BG BG NP                                    Neuro Re-Ed                                                                                                        Ther Ex 5/10 5/15 5/17          ppt  5\"x20 5\"x20          bridges  unable P! Hip add iso  5\"x20 5\"x20          TB hip abd  GTB 5\"x20 GTB 5\"x20          clamshells  5\"x20 5\"x20          dls marches  [id number]          LTR  unable P!            S/l hip abd  2x10 2x10          ITB stretch  nv           SLR flex             SAQ             Gait Training                                       Modalities 5/10 5/15 5/17          CP x10' x10' x10'                   "

## 2023-05-22 ENCOUNTER — OFFICE VISIT (OUTPATIENT)
Dept: PHYSICAL THERAPY | Facility: MEDICAL CENTER | Age: 78
End: 2023-05-22

## 2023-05-22 DIAGNOSIS — M76.32 ILIOTIBIAL BAND SYNDROME OF LEFT SIDE: ICD-10-CM

## 2023-05-22 DIAGNOSIS — Z96.652 HISTORY OF TOTAL KNEE REPLACEMENT, LEFT: ICD-10-CM

## 2023-05-22 DIAGNOSIS — R29.898 WEAKNESS OF LEFT LOWER EXTREMITY: ICD-10-CM

## 2023-05-22 DIAGNOSIS — M25.362 KNEE INSTABILITY, LEFT: ICD-10-CM

## 2023-05-22 DIAGNOSIS — M70.62 TROCHANTERIC BURSITIS OF LEFT HIP: ICD-10-CM

## 2023-05-22 DIAGNOSIS — M25.562 PAIN IN LATERAL PORTION OF LEFT KNEE: Primary | ICD-10-CM

## 2023-05-22 NOTE — PROGRESS NOTES
"Daily Note     Today's date: 2023  Patient name: Candance Ave  : 1945  MRN: [id number]  Referring provider: Zayra Campos  Dx:   Encounter Diagnosis     ICD-10-CM    1  Pain in lateral portion of left knee  M25 562       2  Iliotibial band syndrome of left side  M76 32       3  Trochanteric bursitis of left hip  M70 62       4  Weakness of left lower extremity  R29 898       5  History of total knee replacement, left  Z96 652       6  Knee instability, left  M25 362                      Subjective:  Pt states her hip feels \"out\" and appears to amb with more discomfort today  Objective: See treatment diary below    (+) long -sit test persists for L post rot      Assessment: Tolerated treatment fair  Patient demonstrated fatigue post treatment and would benefit from continued PT  Pt unable to mona may exercises due to knee pain and inability to bend L knee to maintain foot flat on table-persists as of   Soreness persists L glut med/min and ITB region, however states relief post MFR  Plan: Continue per plan of care  Precautions: FM, CHF, DM, h/o thyroid CA, GERD, depression, CKD, HTN, L TKA      Manuals 5/10 5/15 5/17 5/22         MFR foam roller L ITB nv BG BG BG         MET for L post rot BG BG NP BG                                   Neuro Re-Ed                                                                                                        Ther Ex 5/10 5/15 5/17 5/22         ppt  5\"x20 5\"x20 5\"x20         bridges  unable P! Hip add iso  5\"x20 5\"x20 5\"x20         TB hip abd  GTB 5\"x20 GTB 5\"x20 GTB 5\"x20         clamshells  5\"x20 5\"x20 5\"x20         dls marches  X15GE [id number] x20ea         LTR  unable P!            S/l hip abd  2x10 2x10 2x10         ITB stretch  nv           SLR flex             SAQ             Gait Training                                       Modalities 5/10 5/15 5/17 5/22         CP x10' x10' x10' x10'                           "

## 2023-05-26 ENCOUNTER — OFFICE VISIT (OUTPATIENT)
Dept: PHYSICAL THERAPY | Facility: MEDICAL CENTER | Age: 78
End: 2023-05-26

## 2023-05-26 DIAGNOSIS — M76.32 ILIOTIBIAL BAND SYNDROME OF LEFT SIDE: ICD-10-CM

## 2023-05-26 DIAGNOSIS — M70.62 TROCHANTERIC BURSITIS OF LEFT HIP: ICD-10-CM

## 2023-05-26 DIAGNOSIS — M25.362 KNEE INSTABILITY, LEFT: ICD-10-CM

## 2023-05-26 DIAGNOSIS — R29.898 WEAKNESS OF LEFT LOWER EXTREMITY: ICD-10-CM

## 2023-05-26 DIAGNOSIS — M25.562 PAIN IN LATERAL PORTION OF LEFT KNEE: Primary | ICD-10-CM

## 2023-05-26 DIAGNOSIS — Z96.652 HISTORY OF TOTAL KNEE REPLACEMENT, LEFT: ICD-10-CM

## 2023-05-26 NOTE — PROGRESS NOTES
"Daily Note     Today's date: 2023  Patient name: Sedrick Ag  : 1945  MRN: [id number]  Referring provider: Keaton Reyes  Dx:   Encounter Diagnosis     ICD-10-CM    1  Pain in lateral portion of left knee  M25 562       2  Iliotibial band syndrome of left side  M76 32       3  Trochanteric bursitis of left hip  M70 62       4  Weakness of left lower extremity  R29 898       5  History of total knee replacement, left  Z96 652       6  Knee instability, left  M25 362                      Subjective:  Pt states she had an appt with ortho specialist yesterday and is scheduled for L TKA revision on   Pt will stop PT at this time and cont her ex ind at home, and will resume PT after surgery  Objective: See treatment diary below    (-) long -sit test persists for L post rot      Assessment: Tolerated treatment fair  Patient demonstrated fatigue post treatment  Pt provided with updated HEP and d/c at this time  Plan: D/C PT to ind HEP  Precautions: FM, CHF, DM, h/o thyroid CA, GERD, depression, CKD, HTN, L TKA      Manuals 5/10 5/15 5/17 5/22 5/26        MFR foam roller L ITB nv BG BG BG BG        MET for L post rot BG BG NP BG                                   Neuro Re-Ed                                                                                           LAQ     5\"x10        Ther Ex 5/10 5/15 5/17 5/22 5/26        ppt  5\"x20 5\"x20 5\"x20 5\"x20        bridges  unable P!   5\"x20        Hip add iso  5\"x20 5\"x20 5\"x20 5\"x20        TB hip abd  GTB 5\"x20 GTB 5\"x20 GTB 5\"x20 GTB 5\"x20        clamshells  5\"x20 5\"x20 5\"x20 5\"x20        dls marches  [id number] x20ea x20ea        LTR  unable P!            S/l hip abd  2x10 2x10 2x10 2x10        ITB stretch  nv           SLR flex             SAQ             Gait Training                                       Modalities 5/10 5/15 5/17 5/22 5/26        CP x10' x10' x10' x10' np                          "

## 2023-05-30 DIAGNOSIS — M17.11 PRIMARY OSTEOARTHRITIS OF RIGHT KNEE: ICD-10-CM

## 2023-05-30 NOTE — TELEPHONE ENCOUNTER
Pt having knee revision in July  Jesús De Jesus would like to discuss rehab at [facility] with you prior to her scheduling it  Please call

## 2023-05-31 ENCOUNTER — APPOINTMENT (OUTPATIENT)
Dept: PHYSICAL THERAPY | Facility: MEDICAL CENTER | Age: 78
End: 2023-05-31
Payer: MEDICARE

## 2023-05-31 RX ORDER — TRAMADOL HYDROCHLORIDE 50 MG/1
50 TABLET ORAL 2 TIMES DAILY PRN
Qty: 60 TABLET | Refills: 3 | Status: SHIPPED | OUTPATIENT
Start: 2023-05-31

## 2023-06-08 ENCOUNTER — HOSPITAL ENCOUNTER (OUTPATIENT)
Dept: NON INVASIVE DIAGNOSTICS | Facility: CLINIC | Age: 78
Discharge: HOME | End: 2023-06-08
Payer: COMMERCIAL

## 2023-06-08 ENCOUNTER — HOSPITAL ENCOUNTER (OUTPATIENT)
Dept: NON INVASIVE DIAGNOSTICS | Facility: CLINIC | Age: 78
Discharge: HOME | End: 2023-06-08
Payer: MEDICARE

## 2023-06-08 VITALS
HEIGHT: 61 IN | BODY MASS INDEX: 31.15 KG/M2 | SYSTOLIC BLOOD PRESSURE: 125 MMHG | WEIGHT: 165 LBS | HEART RATE: 66 BPM | DIASTOLIC BLOOD PRESSURE: 71 MMHG

## 2023-06-08 DIAGNOSIS — Z79.01 CURRENT USE OF LONG TERM ANTICOAGULATION: ICD-10-CM

## 2023-06-08 DIAGNOSIS — I48.0 PAROXYSMAL ATRIAL FIBRILLATION (HCC): ICD-10-CM

## 2023-06-08 DIAGNOSIS — I10 ESSENTIAL HYPERTENSION: ICD-10-CM

## 2023-06-08 LAB
AORTIC ROOT: 2.6 CM
APICAL FOUR CHAMBER EJECTION FRACTION: 58 %
ASCENDING AORTA: 2.3 CM
E WAVE DECELERATION TIME: 183 MS
FRACTIONAL SHORTENING: 45 % (ref 28–44)
INTERVENTRICULAR SEPTUM IN DIASTOLE (PARASTERNAL SHORT AXIS VIEW): 1 CM
INTERVENTRICULAR SEPTUM: 1 CM (ref 0.6–1.1)
LAAS-AP2: 21.8 CM2
LAAS-AP4: 27.2 CM2
LEFT ATRIUM SIZE: 4.3 CM
LEFT INTERNAL DIMENSION IN SYSTOLE: 2.9 CM (ref 2.1–4)
LEFT VENTRICULAR INTERNAL DIMENSION IN DIASTOLE: 5.3 CM (ref 3.5–6)
LEFT VENTRICULAR POSTERIOR WALL IN END DIASTOLE: 1 CM
LEFT VENTRICULAR STROKE VOLUME: 102 ML
LVSV (TEICH): 102 ML
MV E'TISSUE VEL-SEP: 6 CM/S
MV PEAK A VEL: 0.66 M/S
MV PEAK E VEL: 116 CM/S
MV STENOSIS PRESSURE HALF TIME: 53 MS
MV VALVE AREA P 1/2 METHOD: 4.15 CM2
RA PRESSURE ESTIMATED: 10 MMHG
RIGHT ATRIUM AREA SYSTOLE A4C: 12.3 CM2
RIGHT VENTRICLE ID DIMENSION: 3.9 CM
RV PSP: 64 MMHG
SL CV LEFT ATRIUM LENGTH A2C: 5.7 CM
SL CV LV EF: 55
SL CV PED ECHO LEFT VENTRICLE DIASTOLIC VOLUME (MOD BIPLANE) 2D: 133 ML
SL CV PED ECHO LEFT VENTRICLE SYSTOLIC VOLUME (MOD BIPLANE) 2D: 32 ML
TR MAX PG: 54 MMHG
TR PEAK VELOCITY: 3.7 M/S
TRICUSPID ANNULAR PLANE SYSTOLIC EXCURSION: 1.9 CM
TRICUSPID VALVE PEAK REGURGITATION VELOCITY: 3.69 M/S

## 2023-06-08 PROCEDURE — 93306 TTE W/DOPPLER COMPLETE: CPT | Performed by: INTERNAL MEDICINE

## 2023-06-08 PROCEDURE — 93306 TTE W/DOPPLER COMPLETE: CPT

## 2023-06-08 PROCEDURE — 93922 UPR/L XTREMITY ART 2 LEVELS: CPT

## 2023-06-12 ENCOUNTER — TELEPHONE (OUTPATIENT)
Dept: CARDIOLOGY CLINIC | Facility: CLINIC | Age: 78
End: 2023-06-12

## 2023-06-14 DIAGNOSIS — F33.0 MILD EPISODE OF RECURRENT MAJOR DEPRESSIVE DISORDER (HCC): ICD-10-CM

## 2023-06-14 RX ORDER — DULOXETIN HYDROCHLORIDE 30 MG/1
CAPSULE, DELAYED RELEASE ORAL
Qty: 30 CAPSULE | Refills: 2 | Status: SHIPPED | OUTPATIENT
Start: 2023-06-14

## 2023-06-19 ENCOUNTER — IN-CLINIC DEVICE VISIT (OUTPATIENT)
Dept: CARDIOLOGY CLINIC | Facility: CLINIC | Age: 78
End: 2023-06-19
Payer: MEDICARE

## 2023-06-19 DIAGNOSIS — Z95.0 PRESENCE OF PERMANENT CARDIAC PACEMAKER: Primary | ICD-10-CM

## 2023-06-19 PROCEDURE — 93280 PM DEVICE PROGR EVAL DUAL: CPT | Performed by: INTERNAL MEDICINE

## 2023-06-19 NOTE — PROGRESS NOTES
Results for orders placed or performed in visit on 06/19/23   Cardiac EP device report    Narrative    MDT-DUAL CHAMBER PPM (AAIR-DDDR MODE)/ ACTIVE SYSTEM IS MRI CONDITIONAL  DEVICE INTERROGATED IN THE [facility] OFFICE  BATTERY VOLTAGE ADEQUATE (11 8 YRS)  AP 59 5%  <0 1% ALL LEAD PARAMETERS WITHIN NORMAL LIMITS  NO SIGNIFICANT HIGH RATE EPISODES  NO PROGRAMMING CHANGES MADE TO DEVICE PARAMETERS  NORMAL DEVICE FUNCTION   AM/GRACE

## 2023-06-26 ENCOUNTER — RA CDI HCC (OUTPATIENT)
Dept: OTHER | Facility: HOSPITAL | Age: 78
End: 2023-06-26

## 2023-06-26 ENCOUNTER — OFFICE VISIT (OUTPATIENT)
Dept: CARDIOLOGY CLINIC | Facility: CLINIC | Age: 78
End: 2023-06-26
Payer: MEDICARE

## 2023-06-26 ENCOUNTER — TELEPHONE (OUTPATIENT)
Dept: CARDIOLOGY CLINIC | Facility: CLINIC | Age: 78
End: 2023-06-26

## 2023-06-26 VITALS
BODY MASS INDEX: 30.42 KG/M2 | HEART RATE: 63 BPM | DIASTOLIC BLOOD PRESSURE: 90 MMHG | OXYGEN SATURATION: 96 % | SYSTOLIC BLOOD PRESSURE: 140 MMHG | RESPIRATION RATE: 18 BRPM | WEIGHT: 161.13 LBS | HEIGHT: 61 IN

## 2023-06-26 DIAGNOSIS — Z01.810 PREOP CARDIOVASCULAR EXAM: Primary | ICD-10-CM

## 2023-06-26 DIAGNOSIS — I48.0 PAROXYSMAL ATRIAL FIBRILLATION (HCC): ICD-10-CM

## 2023-06-26 DIAGNOSIS — I27.20 PULMONARY HTN (HCC): ICD-10-CM

## 2023-06-26 DIAGNOSIS — I10 ESSENTIAL HYPERTENSION: ICD-10-CM

## 2023-06-26 DIAGNOSIS — Z95.0 S/P PLACEMENT OF CARDIAC PACEMAKER: ICD-10-CM

## 2023-06-26 DIAGNOSIS — I50.32 CHRONIC DIASTOLIC CHF (CONGESTIVE HEART FAILURE) (HCC): ICD-10-CM

## 2023-06-26 DIAGNOSIS — E78.2 MIXED HYPERLIPIDEMIA: ICD-10-CM

## 2023-06-26 PROCEDURE — 99214 OFFICE O/P EST MOD 30 MIN: CPT | Performed by: INTERNAL MEDICINE

## 2023-06-26 PROCEDURE — 93000 ELECTROCARDIOGRAM COMPLETE: CPT | Performed by: INTERNAL MEDICINE

## 2023-06-26 NOTE — PROGRESS NOTES
I13 0   E11 22   [facility]  coding opportunities          Chart Reviewed number of suggestions sent to Provider: 2     Patients Insurance     Medicare Insurance: [employer]

## 2023-06-26 NOTE — PROGRESS NOTES
Cardiology Follow Up    Honey Arreola  1945  [id number]  [address]    1  Preop cardiovascular exam        2  Paroxysmal atrial fibrillation (HCC)  POCT ECG    Echo follow up/limited w/ contrast if indicated      3  Pulmonary HTN ([facility] )  Echo follow up/limited w/ contrast if indicated      4  Mixed hyperlipidemia  Lipid Panel with Direct LDL reflex      5  Chronic diastolic CHF (congestive heart failure) ([facility] )        6  Essential hypertension        7  S/P placement of cardiac pacemaker            Discussion/Summary: She needs upcoming knee replacement surgery  She is moderate cardiovascular risk no further preop testing is required  Blood pressure on home checks have been well controlled  She is atrial paced on the device no recurrence of A-fib  Continue anticoagulation can hold Eliquis 48 hours prior to procedure  Recent echocardiogram shows preserved LV systolic function pulmonary artery pressures were moderately elevated, I have asked her to use her as needed Lasix at 40 mg instead of 20  We will recheck echocardiogram later this year and see where pulmonary pressures are if they remain elevated we will do ongoing work-up  Interval History:  64 yo pleasant female with  paroxysmal atrial fibrillation, hypertension, hyperlipidemia presents for a follow-up visit  She continues to remain in sinus rhythm on sotalol  Overall she has been doing well still has some shortness of breath and is back into atrial fibrillation  She has been switched to amiodarone but had breakthrough again  Pacemaker has been protecting her from the slow events  This is likely what led to her VF during the 1st ablation  Rates have been stable  Atrial fibrillation has been present for the last few days  Overall she has been feeling well since her last visit    She remains quite active physically mainly limited by knee pain   Denies any anginal sounding chest pain, shortness of breath, palpitations, lightheadedness, dizziness, or syncope  Is been a lower extreme edema, PND, orthopnea  Problem List     Generalized edema    Essential hypertension    Dyslipidemia    Sacroiliitis (HCC)    Acute pain of right knee    Allergic rhinitis    Fibromyalgia    Hyperlipidemia    Insomnia    Lumbar spondylosis    Lumbar stenosis    Osteoarthrosis, hand    Osteoarthritis of knee    Overview Signed 10/4/2018  5:41 PM by Apryl Bolanos MD     Laterality: Right         Osteopenia    Paroxysmal atrial fibrillation (HCC)    Peripheral neuropathy    Restless legs syndrome    Right lumbar radiculopathy    TMJ syndrome    Vitamin D deficiency    Effusion of right knee        Past Medical History:   Diagnosis Date   • Actinic keratosis     last assessed - [id number]   • Arthritis    • Atrial fibrillation with rapid ventricular response ([facility] [address] )     last assessed - 26Apr2016   • Basal cell carcinoma    • Benign colon polyp     last assessed - 27Apr2015   • CHF (congestive heart failure) ([facility] [address] ) 06/2022   • Disease of thyroid gland    • Effusion of knee joint right     Resolved - [id number]   • Esophageal reflux    • Fibromyalgia     last assessed - [id number]   • Fibromyalgia, primary    • GERD (gastroesophageal reflux disease)    • Hypertension    • Palpitations     last assessed - 30Apr2013   • Peroneal tendonitis, right     last assessed - [id number]   • Right lumbar radiculopathy 03/17/2016   • Thyroid cancer ([facility] [address] )    • Thyroid nodule    • Trochanteric bursitis of left hip 03/09/2018     Social History     Socioeconomic History   • Marital status:       Spouse name: Not on file   • Number of children: Not on file   • Years of education: Not on file   • Highest education level: Not on file   Occupational History   • Not on file   Tobacco Use   • Smoking status: Never   • Smokeless tobacco: Never   Vaping Use   • Vaping Use: Never used   Substance and Sexual Activity   • Alcohol use: Not Currently   • Drug use: Never   • Sexual activity: Not Currently   Other Topics Concern   • Not on file   Social History Narrative   • Not on file     Social Determinants of Health     Financial Resource Strain: Low Risk  (9/19/2022)    Overall Financial Resource Strain (CARDIA)    • Difficulty of Paying Living Expenses: Not hard at all   Food Insecurity: Not on file   Transportation Needs: No Transportation Needs (9/19/2022)    PRAPARE - Transportation    • Lack of Transportation (Medical): No    • Lack of Transportation (Non-Medical):  No   Physical Activity: Not on file   Stress: Not on file   Social Connections: Not on file   Intimate Partner Violence: Not on file   Housing Stability: Not on file      Family History   Problem Relation Age of Onset   • Heart disease Mother    • Diabetes Mother    • Heart disease Father    • Coronary artery disease Father    • Stroke Father         cerebrovascular accident   • Heart attack Father         myocardial infarction   • Sudden death Father         scd   • Other Family         Back disorder   • Coronary artery disease Family    • Neuropathy Family    • Osteoporosis Family    • No Known Problems Daughter    • No Known Problems Maternal Grandmother    • No Known Problems Maternal Grandfather    • No Known Problems Paternal Grandmother    • No Known Problems Paternal Grandfather    • Cancer Maternal Uncle    • Breast cancer Maternal Aunt 72   • No Known Problems Son    • No Known Problems Maternal Aunt    • No Known Problems Maternal Aunt    • No Known Problems Maternal Aunt    • No Known Problems Paternal Aunt    • No Known Problems Paternal Aunt    • Anuerysm Neg Hx    • Clotting disorder Neg Hx    • Arrhythmia Neg Hx    • Heart failure Neg Hx      Past Surgical History:   Procedure Laterality Date   • CARDIAC ELECTROPHYSIOLOGY STUDY AND ABLATION  08/2022   • CATARACT EXTRACTION Bilateral    • COLONOSCOPY  03/2018   • COLONOSCOPY W/ POLYPECTOMY  2021    repeat in 5 years   • EYE SURGERY     • HYSTERECTOMY     • JOINT REPLACEMENT Left     knee   • NH NEUROPLASTY &/TRANSPOS MEDIAN NRV CARPAL TUNNE Right 11/14/2019    Procedure: RELEASE CARPAL TUNNEL;  Surgeon: Nick Palm MD;  Location: [facility];  Service: Orthopedics   • NH TOTAL THYROID LOBECTOMY UNI W/WO ISTHMUSECTOMY Left 12/16/2020    Procedure: Left THYROID LOBECTOMY;  Surgeon: Pawan England MD;  Location: [facility];  Service: ENT   • RECTAL POLYPECTOMY     • REPLACEMENT TOTAL KNEE Left     last assessed - 27Apr2015   • TONSILLECTOMY     • TOTAL ABDOMINAL HYSTERECTOMY     • TUBAL LIGATION     • US GUIDED THYROID BIOPSY  10/14/2020       Current Outpatient Medications:   •  amLODIPine (NORVASC) 5 mg tablet, Take 1 tablet (5 mg total) by mouth daily, Disp: 90 tablet, Rfl: 3  •  apixaban (ELIQUIS) 5 mg, Take 1 tablet (5 mg total) by mouth 2 (two) times a day, Disp: 60 tablet, Rfl: 5  •  ascorbic acid (VITAMIN C) 500 mg tablet, Take 500 mg by mouth daily , Disp: , Rfl:   •  Cholecalciferol 50 MCG (2000 UT) CAPS, Take 2,000 Units by mouth 2 (two) times a day  , Disp: , Rfl:   •  Chromium Picolinate (CHROMIUM PICOLATE PO), Take 1 tablet by mouth daily, Disp: , Rfl:   •  DULoxetine (CYMBALTA) 30 mg delayed release capsule, take 1 capsule by mouth once daily, Disp: 30 capsule, Rfl: 2  •  ezetimibe (ZETIA) 10 mg tablet, Take 1 tablet (10 mg total) by mouth daily, Disp: 90 tablet, Rfl: 3  •  famotidine (PEPCID) 20 mg tablet, Take 20 mg by mouth every other day  , Disp: , Rfl:   •  furosemide (LASIX) 20 mg tablet, take 1 tablet by mouth daily if needed for shortness of breath WEIGHT GAIN 3 LBS IN 1 DAY OR LOWER LEG SWELLING, Disp: 10 tablet, Rfl: 3  •  gabapentin (NEURONTIN) 300 mg capsule, Take 1 capsule (300 mg total) by mouth daily at bedtime, Disp: 30 capsule, Rfl: 5  •  ipratropium (ATROVENT) 0 03 % nasal spray, 2 sprays into each nostril 3 (three) times a day Begin once per day, then progress to twice per day  Progress to three times a day as tolerated  , Disp: 90 mL, Rfl: 3  •  lidocaine (XYLOCAINE) 5 % ointment, Apply topically as needed for mild pain, Disp: 35 44 g, Rfl: 0  •  losartan (COZAAR) 50 mg tablet, Take 1 tablet (50 mg total) by mouth 2 (two) times a day, Disp: 180 tablet, Rfl: 3  •  metoprolol succinate (TOPROL-XL) 25 mg 24 hr tablet, Take 3 tablets (75 mg total) by mouth every 12 (twelve) hours, Disp: 540 tablet, Rfl: 3  •  rOPINIRole (REQUIP) 0 5 mg tablet, Take 1 tablet (0 5 mg total) by mouth daily at bedtime, Disp: 90 tablet, Rfl: 3  •  traMADol (ULTRAM) 50 mg tablet, Take 1 tablet (50 mg total) by mouth 2 (two) times a day as needed for moderate pain, Disp: 60 tablet, Rfl: 3  •  TURMERIC PO, Take 1 capsule by mouth 2 (two) times a day, Disp: , Rfl:   •  zolpidem (AMBIEN) 10 mg tablet, Take 1 tablet (10 mg total) by mouth daily at bedtime as needed for sleep, Disp: 90 tablet, Rfl: 1  •  pantoprazole (PROTONIX) 20 mg tablet, Take 1 tablet by mouth daily before breakfast (Patient not taking: Reported on 6/26/2023), Disp: , Rfl:   Allergies   Allergen Reactions   • Sotalol Other (See Comments)     Prolonged QTc leading to torsades de pointes    • Penicillins Other (See Comments)     As a child calcium deposit in the arm    • Ace Inhibitors GI Intolerance     Did feel well on it   • Omeprazole Abdominal Pain, Rash and Vomiting     stomach pain, vomiting, rash       Labs:     Chemistry        Component Value Date/Time     05/06/2015 1116    K 4 4 01/31/2023 0851    K 4 8 05/06/2015 1116    CL 98 01/31/2023 0851    CL 99 (L) 05/06/2015 1116    CO2 28 01/31/2023 0851    CO2 28 05/06/2015 1116    BUN 23 01/31/2023 0851    BUN 19 05/06/2015 1116    CREATININE 1 01 01/31/2023 0851    CREATININE 0 97 05/06/2015 1116        Component Value Date/Time    CALCIUM 9 7 01/31/2023 0851    CALCIUM 9 0 05/06/2015 1116    ALKPHOS 99 01/31/2023 0851    ALKPHOS 60 05/06/2015 1116    AST 19 "01/31/2023 0851    AST 28 05/06/2015 1116    ALT 23 01/31/2023 0851    ALT 34 05/06/2015 1116    BILITOT 0 78 05/06/2015 1116            Lab Results   Component Value Date    CHOL 205 05/06/2015    CHOL 195 07/10/2014     Lab Results   Component Value Date    HDL 66 04/01/2021    HDL 54 08/03/2020    HDL 57 11/25/2019     Lab Results   Component Value Date    LDLCALC 88 04/01/2021    LDLCALC 104 (H) 08/03/2020    LDLCALC 106 (H) 11/25/2019     Lab Results   Component Value Date    TRIG 112 04/01/2021    TRIG 121 08/03/2020    TRIG 106 11/25/2019     No results found for: \"CHOLHDL\"    Imaging: No results found  ECG:    Sinus bradycardia nonspecific T-wave changes     Review of Systems   Constitutional: Negative  HENT: Negative  Eyes: Negative  Cardiovascular: Negative  Negative for leg swelling  Respiratory: Negative  Endocrine: Negative  Hematologic/Lymphatic: Negative  Skin: Negative  Musculoskeletal: Negative  Gastrointestinal: Negative  Genitourinary: Negative  Neurological: Negative  Psychiatric/Behavioral: Negative  Vitals:    06/26/23 0800   BP: 140/90   Pulse: 63   Resp: 18   SpO2: 96%     Vitals:    06/26/23 0800   Weight: 73 1 kg (161 lb 2 oz)     Height: 5' 1\" (154 9 cm)   Body mass index is 30 44 kg/m²  Physical Exam:  Vital signs reviewed  General:  Alert and cooperative, appears stated age, no acute distress  HEENT:  PERRLA, EOMI, no scleral icterus, no conjunctival pallor  Neck:  No lymphadenopathy, no thyromegaly, no carotid bruits, no elevated JVP  Heart:  Regular rate and rhythm, normal S1/S2, no S3/S4, no murmur, rubs or gallops  PMI nondisplaced  Lungs:  Clear to auscultation bilaterally, no wheezes rales or rhonchi  Abdomen:  Soft, non-tender, positive bowel sounds, no rebound or guarding,   no organomegaly   Extremities:  Normal range of motion    No clubbing, cyanosis or edema   Vascular:  2+ pedal pulses  Skin:  No rashes or lesions on exposed " skin  Neurologic:  Cranial nerves II-XII grossly intact without focal deficits  Psych:  Normal mood and affect

## 2023-07-03 ENCOUNTER — APPOINTMENT (OUTPATIENT)
Dept: LAB | Facility: MEDICAL CENTER | Age: 78
End: 2023-07-03
Payer: MEDICARE

## 2023-07-03 ENCOUNTER — OFFICE VISIT (OUTPATIENT)
Dept: FAMILY MEDICINE CLINIC | Facility: CLINIC | Age: 78
End: 2023-07-03
Payer: MEDICARE

## 2023-07-03 VITALS
SYSTOLIC BLOOD PRESSURE: 128 MMHG | DIASTOLIC BLOOD PRESSURE: 74 MMHG | HEART RATE: 62 BPM | HEIGHT: 61 IN | BODY MASS INDEX: 30.78 KG/M2 | TEMPERATURE: 98.2 F | WEIGHT: 163 LBS | OXYGEN SATURATION: 98 %

## 2023-07-03 DIAGNOSIS — I36.1 NONRHEUMATIC TRICUSPID VALVE REGURGITATION: ICD-10-CM

## 2023-07-03 DIAGNOSIS — T84.093A FAILED TOTAL LEFT KNEE REPLACEMENT, INITIAL ENCOUNTER (HCC): ICD-10-CM

## 2023-07-03 DIAGNOSIS — E78.2 MIXED HYPERLIPIDEMIA: ICD-10-CM

## 2023-07-03 DIAGNOSIS — I27.20 PULMONARY HYPERTENSION (HCC): ICD-10-CM

## 2023-07-03 DIAGNOSIS — I35.1 NONRHEUMATIC AORTIC VALVE INSUFFICIENCY: ICD-10-CM

## 2023-07-03 DIAGNOSIS — I50.32 CHRONIC DIASTOLIC CHF (CONGESTIVE HEART FAILURE) (HCC): ICD-10-CM

## 2023-07-03 DIAGNOSIS — Z01.818 PRE-OPERATIVE CLEARANCE: Primary | ICD-10-CM

## 2023-07-03 DIAGNOSIS — I34.0 NONRHEUMATIC MITRAL VALVE REGURGITATION: ICD-10-CM

## 2023-07-03 DIAGNOSIS — M17.11 PRIMARY OSTEOARTHRITIS OF RIGHT KNEE: ICD-10-CM

## 2023-07-03 DIAGNOSIS — D32.9 MENINGIOMA (HCC): ICD-10-CM

## 2023-07-03 DIAGNOSIS — Z51.81 ENCOUNTER FOR THERAPEUTIC DRUG MONITORING: ICD-10-CM

## 2023-07-03 DIAGNOSIS — D47.2 MGUS (MONOCLONAL GAMMOPATHY OF UNKNOWN SIGNIFICANCE): ICD-10-CM

## 2023-07-03 DIAGNOSIS — Z95.0 S/P PLACEMENT OF CARDIAC PACEMAKER: ICD-10-CM

## 2023-07-03 DIAGNOSIS — I50.32 CHRONIC DIASTOLIC HEART FAILURE (HCC): ICD-10-CM

## 2023-07-03 DIAGNOSIS — Z01.818 ENCOUNTER FOR PREADMISSION TESTING: ICD-10-CM

## 2023-07-03 DIAGNOSIS — I48.19 PERSISTENT ATRIAL FIBRILLATION (HCC): ICD-10-CM

## 2023-07-03 DIAGNOSIS — F11.20 CONTINUOUS OPIOID DEPENDENCE (HCC): ICD-10-CM

## 2023-07-03 DIAGNOSIS — I10 ESSENTIAL HYPERTENSION: ICD-10-CM

## 2023-07-03 PROBLEM — R07.89 OTHER CHEST PAIN: Status: RESOLVED | Noted: 2018-09-15 | Resolved: 2023-07-03

## 2023-07-03 PROBLEM — Z01.810 PREOP CARDIOVASCULAR EXAM: Status: RESOLVED | Noted: 2020-12-09 | Resolved: 2023-07-03

## 2023-07-03 LAB
ALBUMIN SERPL BCP-MCNC: 3.5 G/DL (ref 3.5–5)
ALP SERPL-CCNC: 86 U/L (ref 46–116)
ALT SERPL W P-5'-P-CCNC: 24 U/L (ref 12–78)
ANION GAP SERPL CALCULATED.3IONS-SCNC: 5 MMOL/L
APTT PPP: 33 SECONDS (ref 23–37)
AST SERPL W P-5'-P-CCNC: 20 U/L (ref 5–45)
BASOPHILS # BLD AUTO: 0.05 THOUSANDS/ÂΜL (ref 0–0.1)
BASOPHILS NFR BLD AUTO: 1 % (ref 0–1)
BILIRUB SERPL-MCNC: 0.66 MG/DL (ref 0.2–1)
BUN SERPL-MCNC: 26 MG/DL (ref 5–25)
CALCIUM SERPL-MCNC: 9.8 MG/DL (ref 8.3–10.1)
CHLORIDE SERPL-SCNC: 105 MMOL/L (ref 96–108)
CHOLEST SERPL-MCNC: 163 MG/DL
CO2 SERPL-SCNC: 26 MMOL/L (ref 21–32)
CREAT SERPL-MCNC: 0.88 MG/DL (ref 0.6–1.3)
EOSINOPHIL # BLD AUTO: 0.23 THOUSAND/ÂΜL (ref 0–0.61)
EOSINOPHIL NFR BLD AUTO: 5 % (ref 0–6)
ERYTHROCYTE [DISTWIDTH] IN BLOOD BY AUTOMATED COUNT: 12.6 % (ref 11.6–15.1)
EST. AVERAGE GLUCOSE BLD GHB EST-MCNC: 143 MG/DL
GFR SERPL CREATININE-BSD FRML MDRD: 63 ML/MIN/1.73SQ M
GLUCOSE P FAST SERPL-MCNC: 108 MG/DL (ref 65–99)
HBA1C MFR BLD: 6.6 %
HCT VFR BLD AUTO: 39.6 % (ref 34.8–46.1)
HDLC SERPL-MCNC: 53 MG/DL
HGB BLD-MCNC: 12.9 G/DL (ref 11.5–15.4)
IMM GRANULOCYTES # BLD AUTO: 0.01 THOUSAND/UL (ref 0–0.2)
IMM GRANULOCYTES NFR BLD AUTO: 0 % (ref 0–2)
LDLC SERPL CALC-MCNC: 96 MG/DL (ref 0–100)
LYMPHOCYTES # BLD AUTO: 1.47 THOUSANDS/ÂΜL (ref 0.6–4.47)
LYMPHOCYTES NFR BLD AUTO: 31 % (ref 14–44)
MCH RBC QN AUTO: 31.1 PG (ref 26.8–34.3)
MCHC RBC AUTO-ENTMCNC: 32.6 G/DL (ref 31.4–37.4)
MCV RBC AUTO: 95 FL (ref 82–98)
MONOCYTES # BLD AUTO: 0.61 THOUSAND/ÂΜL (ref 0.17–1.22)
MONOCYTES NFR BLD AUTO: 13 % (ref 4–12)
NEUTROPHILS # BLD AUTO: 2.44 THOUSANDS/ÂΜL (ref 1.85–7.62)
NEUTS SEG NFR BLD AUTO: 50 % (ref 43–75)
NRBC BLD AUTO-RTO: 0 /100 WBCS
PLATELET # BLD AUTO: 266 THOUSANDS/UL (ref 149–390)
PMV BLD AUTO: 10.5 FL (ref 8.9–12.7)
POTASSIUM SERPL-SCNC: 4.5 MMOL/L (ref 3.5–5.3)
PROT SERPL-MCNC: 7.4 G/DL (ref 6.4–8.4)
RBC # BLD AUTO: 4.15 MILLION/UL (ref 3.81–5.12)
SODIUM SERPL-SCNC: 136 MMOL/L (ref 135–147)
TRIGL SERPL-MCNC: 68 MG/DL
WBC # BLD AUTO: 4.81 THOUSAND/UL (ref 4.31–10.16)

## 2023-07-03 PROCEDURE — 99214 OFFICE O/P EST MOD 30 MIN: CPT | Performed by: FAMILY MEDICINE

## 2023-07-03 PROCEDURE — 36415 COLL VENOUS BLD VENIPUNCTURE: CPT

## 2023-07-03 PROCEDURE — 85730 THROMBOPLASTIN TIME PARTIAL: CPT

## 2023-07-03 PROCEDURE — 80061 LIPID PANEL: CPT

## 2023-07-03 RX ORDER — METOPROLOL SUCCINATE 25 MG/1
75 TABLET, EXTENDED RELEASE ORAL EVERY 12 HOURS SCHEDULED
Qty: 540 TABLET | Refills: 3 | Status: SHIPPED | OUTPATIENT
Start: 2023-07-03 | End: 2023-08-02

## 2023-07-03 NOTE — PROGRESS NOTES
Name: Eder Ghotra      : 1945      MRN: [id number]  Encounter Provider: Diomedes Gomez MD  Encounter Date: 7/3/2023   Encounter department: [address] [place] [address]     1. Pre-operative clearance    2. Primary osteoarthritis of right knee    3. Essential hypertension  -     metoprolol succinate (TOPROL-XL) 25 mg 24 hr tablet; Take 3 tablets (75 mg total) by mouth every 12 (twelve) hours    4. Persistent atrial fibrillation (HCC)  -     metoprolol succinate (TOPROL-XL) 25 mg 24 hr tablet; Take 3 tablets (75 mg total) by mouth every 12 (twelve) hours    5. Mixed hyperlipidemia    6. Meningioma (HCC)-brain     7. Chronic diastolic heart failure (HCC)  -     metoprolol succinate (TOPROL-XL) 25 mg 24 hr tablet; Take 3 tablets (75 mg total) by mouth every 12 (twelve) hours    8. Pulmonary hypertension ([address])    9. Chronic diastolic CHF (congestive heart failure) ([address])    10. Nonrheumatic tricuspid valve regurgitation-mild to moderate 2023     11. Nonrheumatic mitral valve regurgitation-moderate 2023     12. Nonrheumatic aortic valve insufficiency-mild 2023     13. Continuous opioid dependence ([address])    14. MGUS (monoclonal gammopathy of unknown significance)    15. S/P placement of cardiac pacemaker    Medically cleared for surgery. Patient instructed to take blood pressure medications on the morning of surgery with sips of water. Per Cardiology hold Eliquis for 48 hours prior to procedure. Subjective     Here for preoperative clearance. Diagnosis OA right knee surgery right total knee replacement. Surgeon Dr Gurjit Hernandez. S/p L TKR . She has been using PRN Tylenol and Tramadol for pain. Medications reviewed. Type 2 DM diet controlled. 2023 A1c 6.6. 2023  Urine micro albumin normal at 9.2. on ARB. no DPN. Last eye exam > 1 year ago.   Hypertension/CKD stage 3 blood pressures have been stable on Losartan 50 mg BID, Metoprolol ER 75 mg BID and Amlodipine 5 mg/day. 06/2023 creatinine 0.88. GFR 63. Electrolytes normal. Hgb 12.9.. Hyperlipidemia on Zetia 10 mg daily. 06/2023 Cholesterol 163. Triglycerides 68. HDL 53. LDL 96. LFTs normal. Pre admission labs 07/2023 reviewed. 06/2023 EKG atrial paced. 06/2023 Echo  Left Ventricle: Left ventricular cavity size is normal. Wall thickness is normal. The left ventricular ejection fraction is 55%. Systolic function is normal. Wall motion is normal. Diastolic function is moderately abnormal, consistent with grade II (pseudonormal) relaxation. •  Right Ventricle: Right ventricular cavity size is normal. Systolic function is normal. A pacer wire is present. •  Left Atrium: The atrium is mildly dilated. •  Aortic Valve: There is mild regurgitation. •  Mitral Valve: There is moderate regurgitation. •  Tricuspid Valve: There is mild to moderate regurgitation. The right ventricular systolic pressure is moderately to severely elevated. The estimated right ventricular systolic pressure is 58.95 mmHg. •  Prior TTE study available for comparison. Prior study date: 1/4/2021. Changes noted when compared to prior study. Changes include: Mitral regurgitation has worsened.  .        Recent Results (from the past 672 hour(s))   Echo complete w/ contrast if indicated    Collection Time: 06/08/23 10:12 AM   Result Value Ref Range    LA size 4.3 cm    Triscuspid Valve Regurgitation Peak Gradient 54.0 mmHg    Tricuspid valve peak regurgitation velocity 3.69 m/s    LVPWd 1.00 cm    Left Atrium Area-systolic Apical Two Chamber 21.8 cm2    Left Atrium Area-systolic Four Chamber 84.2 cm2    MV E' Tissue Velocity Septal 6 cm/s    Tricuspid annular plane systolic excursion 7.68 cm    TR Peak Chaz 3.7 m/s    IVSd 3.79 cm    LV DIASTOLIC VOLUME (MOD BIPLANE) 2D 133 mL    LEFT VENTRICLE SYSTOLIC VOLUME (MOD BIPLANE) 2D 32 mL    Left ventricular stroke volume (2D) 102.00 mL    A4C EF 58 %    LA length (A2C) 5.70 cm    LVIDd 5.30 cm    IVS 1 cm LVIDS 2.90 cm    FS 45 28 - 44 %    Asc Ao 2.3 cm    Ao root 2.60 cm    RVID d 3.9 cm    MV valve area p 1/2 method 4.15 cm2    E wave deceleration time 183 ms    MV Peak E Chaz 116 cm/s    MV Peak A Chaz 0.66 m/s    RAA A4C 12.3 cm2    MV stenosis pressure 1/2 time 53 ms    LVSV, 2D 102 mL    LV EF 55     Est. RA pres 10.0 mmHg    Right Ventricular Peak Systolic Pressure 75.18 mmHg   Comprehensive metabolic panel    Collection Time: 07/03/23  8:23 AM   Result Value Ref Range    Sodium 136 135 - 147 mmol/L    Potassium 4.5 3.5 - 5.3 mmol/L    Chloride 105 96 - 108 mmol/L    CO2 26 21 - 32 mmol/L    ANION GAP 5 mmol/L    BUN 26 (H) 5 - 25 mg/dL    Creatinine 0.88 0.60 - 1.30 mg/dL    Glucose, Fasting 108 (H) 65 - 99 mg/dL    Calcium 9.8 8.3 - 10.1 mg/dL    AST 20 5 - 45 U/L    ALT 24 12 - 78 U/L    Alkaline Phosphatase 86 46 - 116 U/L    Total Protein 7.4 6.4 - 8.4 g/dL    Albumin 3.5 3.5 - 5.0 g/dL    Total Bilirubin 0.66 0.20 - 1.00 mg/dL    eGFR 63 ml/min/1.73sq m   CBC and differential    Collection Time: 07/03/23  8:23 AM   Result Value Ref Range    WBC 4.81 4.31 - 10.16 Thousand/uL    RBC 4.15 3.81 - 5.12 Million/uL    Hemoglobin 12.9 11.5 - 15.4 g/dL    Hematocrit 39.6 34.8 - 46.1 %    MCV 95 82 - 98 fL    MCH 31.1 26.8 - 34.3 pg    MCHC 32.6 31.4 - 37.4 g/dL    RDW 12.6 11.6 - 15.1 %    MPV 10.5 8.9 - 12.7 fL    Platelets 455 816 - 221 Thousands/uL    nRBC 0 /100 WBCs    Neutrophils Relative 50 43 - 75 %    Immat GRANS % 0 0 - 2 %    Lymphocytes Relative 31 14 - 44 %    Monocytes Relative 13 (H) 4 - 12 %    Eosinophils Relative 5 0 - 6 %    Basophils Relative 1 0 - 1 %    Neutrophils Absolute 2.44 1.85 - 7.62 Thousands/µL    Immature Grans Absolute 0.01 0.00 - 0.20 Thousand/uL    Lymphocytes Absolute 1.47 0.60 - 4.47 Thousands/µL    Monocytes Absolute 0.61 0.17 - 1.22 Thousand/µL    Eosinophils Absolute 0.23 0.00 - 0.61 Thousand/µL    Basophils Absolute 0.05 0.00 - 0.10 Thousands/µL   Hemoglobin A1C Collection Time: 07/03/23  8:23 AM   Result Value Ref Range    Hemoglobin A1C 6.6 (H) Normal 3.8-5.6%; PreDiabetic 5.7-6.4%; Diabetic >=6.5%; Glycemic control for adults with diabetes <7.0% %     mg/dl   Sedimentation rate, automated    Collection Time: 07/03/23  8:23 AM   Result Value Ref Range    Sed Rate 29 0 - 29 mm/hour   Protime-INR    Collection Time: 07/03/23  8:23 AM   Result Value Ref Range    Protime 14.6 (H) 11.6 - 14.5 seconds    INR 1.12 0.84 - 1.19   Lipid Panel with Direct LDL reflex    Collection Time: 07/03/23  8:23 AM   Result Value Ref Range    Cholesterol 163 See Comment mg/dL    Triglycerides 68 See Comment mg/dL    HDL, Direct 53 >=50 mg/dL    LDL Calculated 96 0 - 100 mg/dL   APTT    Collection Time: 07/03/23  8:23 AM   Result Value Ref Range    PTT 33 23 - 37 seconds           Review of Systems   Constitutional: Negative for appetite change, chills, fatigue, fever and unexpected weight change. HENT: Positive for hearing loss (bilateral hearing aids ). Negative for congestion, ear pain, rhinorrhea, sore throat and trouble swallowing. Eyes: Negative for visual disturbance. Respiratory: Negative for cough, shortness of breath and wheezing. Cardiovascular: Negative for chest pain, palpitations and leg swelling.        06/2022  Admission for CHF, hyponatremia with altered mental status. Chest x-ray shows small bilateral effusions. . Sodium prior to admission 125. On repeat 128 in the setting of volume overload. Patient was treated with IV diuresis with symptomatic improvement. Discharge sodium 136. Creatinine  Mental status improved back to baseline. Limited echocardiogram Small left ventricular systolic function with EF 60% improved from 45%. Right ventricle normal.  Dilated left atrium. 05/2022 echo  Left Ventricle: Left ventricular cavity size is normal. Wall thickness is mildly increased. There is moderate concentric hypertrophy.  The left ventricular ejection fraction is 45%. Systolic function is mildly reduced. There is mild global hypokinesis. Right Ventricle: Right ventricular cavity size is mildly dilated. Systolic function is mildly reduced. Left Atrium: The atrium is mildly dilated. Right Atrium: The atrium is mildly dilated. Mitral Valve: There is mild annular calcification. There is mild to moderate regurgitation. Tricuspid Valve: There is moderate regurgitation. The right ventricular systolic pressure is severely elevated. The estimated right ventricular systolic pressure is 49.75 mmHg. Prior  cardio versions. 03/2021 admission for atrial fibrillation status post ablation procedure. Pre procedure episode of bradycardia with QT prolongation and torsades successfully defibrillated. Subsequent pacemaker placed. Repeat ablation 08/2022 at [facility]. Gastrointestinal: Negative for abdominal pain, blood in stool, constipation, diarrhea, nausea and vomiting. Colonoscopy 04/2018 Two benign polyps. Mild diverticulosis left-sided colon. Endocrine: Negative for polydipsia and polyuria. 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 09/2018 DEXA scan T-score -1.5 left femoral neck. On vitamin D supplement    Lab Results       Component                Value               Date                       [id number]             1.210               01/31/2023                                                 Genitourinary: Negative for difficulty urinating. Musculoskeletal: Negative for arthralgias and myalgias. OA shoulders/ she is using prn Tylenol Arthritis and Tramadol.  07/2019 x-rays right shoulder mild degenerative arthritis right AC joint. Left shoulder mild degenerative changes left AC joint. 08/2019 bilateral shoulder intra-articular steroid injections via fluoroscopy with symptomatic relief.  S/p bilateral TKR. Skin: Negative for rash. Scaly lesions face, nose    Neurological: Positive for tremors. Negative for dizziness and headaches. 04/2022 MRI brain 4.2 x 1.7 x 3.0 cm fairly homogeneously enhancing, extra-axial mass within the inferior lateral aspect of the left anterior cranial fossa. Mild adjacent dural thickening and enhancement is seen extending inferiorly into the middle cranial fossa and superiorly into the dura of the anterior frontal vertex. Findings are most suggestive of meningioma. Neurosurgical evaluation at [facility] 05/2022. Second opinion at [facility]. RLS on Requip. 04/2021 neurology evaluation for tremor suspected benign essential  tremor. She is on a Beta-blocker and Gabapentin. Family history + essential tremor brother. Hematological: Negative for adenopathy. Bruises/bleeds easily (on Eliquis ). Monoclonal gammopathy. 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       Lab Results       Component                Value               Date                       WBC                      4.81                07/03/2023                 HGB                      12.9                07/03/2023                 HCT                      39.6                07/03/2023                 MCV                      95                  07/03/2023                 PLT                      266                 07/03/2023                     IGG       Date                     Value               Ref Range           Status                01/31/2023               905.0               700.0 - 1,600. *     Final                 07/11/2022               960.0               700.0 - 1,600. *     Final                 10/27/2021               793.0               700.0-1,600.0 *     Final                        Lab Results       Component                Value               Date                       [id number]               799                 07/01/2022                          MMA elevated at 489 Psychiatric/Behavioral: Negative for dysphoric mood and sleep disturbance. The patient is nervous/anxious. Stable on low dose Duloxetine 30 mg/day.   Chronic insomnia on prn Zolpidem 10 mg                 Past Medical History:   Diagnosis Date   • Actinic keratosis     last assessed - [id number]   • Arthritis    • Atrial fibrillation with rapid ventricular response (HCC)     last assessed - 26Apr2016   • Basal cell carcinoma    • Benign colon polyp     last assessed - 27Apr2015   • CHF (congestive heart failure) ([address]) 06/2022   • Disease of thyroid gland    • Effusion of knee joint right     Resolved - [id number]   • Esophageal reflux    • Fibromyalgia     last assessed - 27Dec2017   • Fibromyalgia, primary    • GERD (gastroesophageal reflux disease)    • Hypertension    • Palpitations     last assessed - 30Apr2013   • Peroneal tendonitis, right     last assessed - 02Oct2013   • Right lumbar radiculopathy 03/17/2016   • Thyroid cancer ([address])    • Thyroid nodule    • Trochanteric bursitis of left hip 03/09/2018     Past Surgical History:   Procedure Laterality Date   • CARDIAC ELECTROPHYSIOLOGY STUDY AND ABLATION  08/2022   • CATARACT EXTRACTION Bilateral    • COLONOSCOPY  03/2018   • COLONOSCOPY W/ POLYPECTOMY  2021    repeat in 5 years   • EYE SURGERY     • HYSTERECTOMY     • JOINT REPLACEMENT Left     knee   • IN NEUROPLASTY &/TRANSPOS MEDIAN NRV CARPAL TUNNE Right 11/14/2019    Procedure: RELEASE CARPAL TUNNEL;  Surgeon: Jason Scott MD;  Location: [facility];  Service: Orthopedics   • IN TOTAL THYROID LOBECTOMY UNI W/WO ISTHMUSECTOMY Left 12/16/2020    Procedure: Left THYROID LOBECTOMY;  Surgeon: Madhu Julien MD;  Location: [facility];  Service: ENT   • RECTAL POLYPECTOMY     • REPLACEMENT TOTAL KNEE Left     last assessed - 27Apr2015   • TONSILLECTOMY     • TOTAL ABDOMINAL HYSTERECTOMY     • TUBAL LIGATION     • US GUIDED THYROID BIOPSY  10/14/2020     Family History   Problem Relation Age of Onset   • Heart disease Mother    • Diabetes Mother    • Heart disease Father    • Coronary artery disease Father    • Stroke Father         cerebrovascular accident   • Heart attack Father         myocardial infarction   • Sudden death Father         scd   • Other Family         Back disorder   • Coronary artery disease Family    • Neuropathy Family    • Osteoporosis Family    • No Known Problems Daughter    • No Known Problems Maternal Grandmother    • No Known Problems Maternal Grandfather    • No Known Problems Paternal Grandmother    • No Known Problems Paternal Grandfather    • Cancer Maternal Uncle    • Breast cancer Maternal Aunt 72   • No Known Problems Son    • No Known Problems Maternal Aunt    • No Known Problems Maternal Aunt    • No Known Problems Maternal Aunt    • No Known Problems Paternal Aunt    • No Known Problems Paternal Aunt    • Anuerysm Neg Hx    • Clotting disorder Neg Hx    • Arrhythmia Neg Hx    • Heart failure Neg Hx      Social History     Socioeconomic History   • Marital status:      Spouse name: Not on file   • Number of children: Not on file   • Years of education: Not on file   • Highest education level: Not on file   Occupational History   • Not on file   Tobacco Use   • Smoking status: Never   • Smokeless tobacco: Never   Vaping Use   • Vaping Use: Never used   Substance and Sexual Activity   • Alcohol use: Not Currently   • Drug use: Never   • Sexual activity: Not Currently   Other Topics Concern   • Not on file   Social History Narrative   • Not on file     Social Determinants of Health     Financial Resource Strain: Low Risk  (9/19/2022)    Overall Financial Resource Strain (CARDIA)    • Difficulty of Paying Living Expenses: Not hard at all   Food Insecurity: Not on file   Transportation Needs: No Transportation Needs (9/19/2022)    PRAPARE - Transportation    • Lack of Transportation (Medical): No    • Lack of Transportation (Non-Medical):  No   Physical Activity: Not on file Stress: Not on file   Social Connections: Not on file   Intimate Partner Violence: Not on file   Housing Stability: Not on file     Current Outpatient Medications on File Prior to Visit   Medication Sig   • amLODIPine (NORVASC) 5 mg tablet Take 1 tablet (5 mg total) by mouth daily   • apixaban (ELIQUIS) 5 mg Take 1 tablet (5 mg total) by mouth 2 (two) times a day   • ascorbic acid (VITAMIN C) 500 mg tablet Take 500 mg by mouth daily    • Chromium Picolinate (CHROMIUM PICOLATE PO) Take 1 tablet by mouth daily   • DULoxetine (CYMBALTA) 30 mg delayed release capsule take 1 capsule by mouth once daily   • ezetimibe (ZETIA) 10 mg tablet Take 1 tablet (10 mg total) by mouth daily   • famotidine (PEPCID) 20 mg tablet Take 20 mg by mouth every other day     • furosemide (LASIX) 20 mg tablet take 1 tablet by mouth daily if needed for shortness of breath WEIGHT GAIN 3 LBS IN 1 DAY OR LOWER LEG SWELLING   • gabapentin (NEURONTIN) 300 mg capsule Take 1 capsule (300 mg total) by mouth daily at bedtime   • ipratropium (ATROVENT) 0.03 % nasal spray 2 sprays into each nostril 3 (three) times a day Begin once per day, then progress to twice per day. Progress to three times a day as tolerated.    • lidocaine (XYLOCAINE) 5 % ointment Apply topically as needed for mild pain   • losartan (COZAAR) 50 mg tablet Take 1 tablet (50 mg total) by mouth 2 (two) times a day   • rOPINIRole (REQUIP) 0.5 mg tablet Take 1 tablet (0.5 mg total) by mouth daily at bedtime   • traMADol (ULTRAM) 50 mg tablet Take 1 tablet (50 mg total) by mouth 2 (two) times a day as needed for moderate pain   • TURMERIC PO Take 1 capsule by mouth 2 (two) times a day   • zolpidem (AMBIEN) 10 mg tablet Take 1 tablet (10 mg total) by mouth daily at bedtime as needed for sleep   • Cholecalciferol 50 MCG (2000 UT) CAPS Take 2,000 Units by mouth 2 (two) times a day   (Patient not taking: Reported on 7/3/2023)   • pantoprazole (PROTONIX) 20 mg tablet Take 1 tablet by mouth daily before breakfast (Patient not taking: Reported on 6/26/2023)   • [DISCONTINUED] metoprolol succinate (TOPROL-XL) 25 mg 24 hr tablet Take 3 tablets (75 mg total) by mouth every 12 (twelve) hours     Allergies   Allergen Reactions   • Sotalol Other (See Comments)     Prolonged QTc leading to torsades de pointes    • Penicillins Other (See Comments)     As a child calcium deposit in the arm    • Ace Inhibitors GI Intolerance     Did feel well on it   • Omeprazole Abdominal Pain, Rash and Vomiting     stomach pain, vomiting, rash     Immunization History   Administered Date(s) Administered   • COVID-19 MODERNA VACC 0.5 ML IM 01/27/2021, 02/24/2021   • INFLUENZA 12/23/2015, 11/07/2016, 10/18/2017, 12/04/2018, 09/19/2022   • Influenza Split High Dose Preservative Free IM 10/01/2014, 12/23/2015, 11/07/2016, 10/18/2017   • Influenza, high dose seasonal 0.7 mL 12/04/2018, 09/26/2019, 09/08/2020, 11/03/2021, 09/19/2022   • Influenza, seasonal, injectable 10/16/2012   • Pneumococcal Conjugate 13-Valent 04/27/2015   • Pneumococcal Polysaccharide PPV23 03/29/2022       Objective     /74 (BP Location: Left arm, Patient Position: Sitting, Cuff Size: Large)   Pulse 62   Temp 98.2 °F (36.8 °C)   Ht 5' 1" (1.549 m)   Wt 73.9 kg (163 lb)   LMP 02/01/1990 (Within Weeks)   SpO2 98%   BMI 30.80 kg/m²     Physical Exam  Vitals and nursing note reviewed. Constitutional:       General: She is not in acute distress. Appearance: She is well-developed. HENT:      Mouth/Throat:      Mouth: No oral lesions. Pharynx: Oropharynx is clear. Eyes:      General: No scleral icterus. Extraocular Movements: Extraocular movements intact. Conjunctiva/sclera: Conjunctivae normal.      Pupils: Pupils are equal, round, and reactive to light. Neck:      Thyroid: No thyroid mass or thyromegaly. Vascular: No carotid bruit or JVD. Trachea: No tracheal deviation.    Cardiovascular:      Rate and Rhythm: Normal rate and regular rhythm. Heart sounds: Normal heart sounds. No murmur heard. No gallop. Pulmonary:      Effort: Pulmonary effort is normal. No respiratory distress. Breath sounds: Normal breath sounds. No wheezing or rales. Musculoskeletal:      Right lower leg: No edema. Left lower leg: No edema. Lymphadenopathy:      Cervical: No cervical adenopathy. Upper Body:      Right upper body: No supraclavicular adenopathy. Left upper body: No supraclavicular adenopathy. Skin:     Findings: No rash. Nails: There is no clubbing. Neurological:      General: No focal deficit present. Mental Status: She is alert and oriented to person, place, and time.    Psychiatric:         Mood and Affect: Mood normal.         Behavior: Behavior normal.         Cognition and Memory: Cognition normal.       Lien Torres MD

## 2023-07-10 DIAGNOSIS — E78.00 HYPERCHOLESTEREMIA: ICD-10-CM

## 2023-07-10 RX ORDER — EZETIMIBE 10 MG/1
TABLET ORAL
Qty: 90 TABLET | Refills: 3 | Status: SHIPPED | OUTPATIENT
Start: 2023-07-10

## 2023-07-28 ENCOUNTER — TELEPHONE (OUTPATIENT)
Dept: CARDIOLOGY CLINIC | Facility: CLINIC | Age: 78
End: 2023-07-28

## 2023-07-28 NOTE — TELEPHONE ENCOUNTER
Fax' d patient's OV notes with clearance from 6/26.  FAX' d EKG to [facility] with [address] FAX# [phone]

## 2023-08-04 ENCOUNTER — APPOINTMENT (EMERGENCY)
Dept: CT IMAGING | Facility: HOSPITAL | Age: 78
DRG: 309 | End: 2023-08-04
Payer: MEDICARE

## 2023-08-04 ENCOUNTER — TELEPHONE (OUTPATIENT)
Dept: CARDIOLOGY CLINIC | Facility: CLINIC | Age: 78
End: 2023-08-04

## 2023-08-04 ENCOUNTER — HOSPITAL ENCOUNTER (INPATIENT)
Facility: HOSPITAL | Age: 78
LOS: 5 days | DRG: 309 | End: 2023-08-09
Attending: EMERGENCY MEDICINE | Admitting: INTERNAL MEDICINE
Payer: MEDICARE

## 2023-08-04 ENCOUNTER — APPOINTMENT (EMERGENCY)
Dept: RADIOLOGY | Facility: HOSPITAL | Age: 78
DRG: 309 | End: 2023-08-04
Payer: MEDICARE

## 2023-08-04 DIAGNOSIS — I50.32 CHRONIC DIASTOLIC CHF (CONGESTIVE HEART FAILURE) (HCC): ICD-10-CM

## 2023-08-04 DIAGNOSIS — I48.91 ATRIAL FIBRILLATION WITH RVR (HCC): Primary | ICD-10-CM

## 2023-08-04 DIAGNOSIS — R93.89 ABNORMAL CT OF THE CHEST: ICD-10-CM

## 2023-08-04 DIAGNOSIS — R21 RASH AND NONSPECIFIC SKIN ERUPTION: ICD-10-CM

## 2023-08-04 DIAGNOSIS — I50.32 CHRONIC DIASTOLIC HEART FAILURE (HCC): ICD-10-CM

## 2023-08-04 DIAGNOSIS — I10 ESSENTIAL HYPERTENSION: ICD-10-CM

## 2023-08-04 DIAGNOSIS — I48.19 PERSISTENT ATRIAL FIBRILLATION (HCC): ICD-10-CM

## 2023-08-04 DIAGNOSIS — Z95.0 S/P PLACEMENT OF CARDIAC PACEMAKER: ICD-10-CM

## 2023-08-04 PROBLEM — D64.9 ANEMIA: Status: ACTIVE | Noted: 2023-08-04

## 2023-08-04 LAB
2HR DELTA HS TROPONIN: -2 NG/L
4HR DELTA HS TROPONIN: 0 NG/L
ALBUMIN SERPL BCP-MCNC: 3.7 G/DL (ref 3.5–5)
ALP SERPL-CCNC: 63 U/L (ref 34–104)
ALT SERPL W P-5'-P-CCNC: 13 U/L (ref 7–52)
ANION GAP SERPL CALCULATED.3IONS-SCNC: 8 MMOL/L
AST SERPL W P-5'-P-CCNC: 17 U/L (ref 13–39)
ATRIAL RATE: 144 BPM
BASOPHILS # BLD AUTO: 0.06 THOUSANDS/ÂΜL (ref 0–0.1)
BASOPHILS NFR BLD AUTO: 1 % (ref 0–1)
BILIRUB SERPL-MCNC: 1.07 MG/DL (ref 0.2–1)
BNP SERPL-MCNC: 651 PG/ML (ref 0–100)
BUN SERPL-MCNC: 20 MG/DL (ref 5–25)
CALCIUM SERPL-MCNC: 9 MG/DL (ref 8.4–10.2)
CARDIAC TROPONIN I PNL SERPL HS: 14 NG/L
CARDIAC TROPONIN I PNL SERPL HS: 16 NG/L
CARDIAC TROPONIN I PNL SERPL HS: 16 NG/L
CHLORIDE SERPL-SCNC: 98 MMOL/L (ref 96–108)
CO2 SERPL-SCNC: 26 MMOL/L (ref 21–32)
CREAT SERPL-MCNC: 0.79 MG/DL (ref 0.6–1.3)
D DIMER PPP FEU-MCNC: 1.44 UG/ML FEU
EOSINOPHIL # BLD AUTO: 0.24 THOUSAND/ÂΜL (ref 0–0.61)
EOSINOPHIL NFR BLD AUTO: 3 % (ref 0–6)
ERYTHROCYTE [DISTWIDTH] IN BLOOD BY AUTOMATED COUNT: 13.2 % (ref 11.6–15.1)
GFR SERPL CREATININE-BSD FRML MDRD: 71 ML/MIN/1.73SQ M
GLUCOSE SERPL-MCNC: 159 MG/DL (ref 65–140)
GLUCOSE SERPL-MCNC: 183 MG/DL (ref 65–140)
GLUCOSE SERPL-MCNC: 246 MG/DL (ref 65–140)
HCT VFR BLD AUTO: 30.9 % (ref 34.8–46.1)
HGB BLD-MCNC: 10 G/DL (ref 11.5–15.4)
IMM GRANULOCYTES # BLD AUTO: 0.03 THOUSAND/UL (ref 0–0.2)
IMM GRANULOCYTES NFR BLD AUTO: 0 % (ref 0–2)
INR PPP: 1.37 (ref 0.84–1.19)
LYMPHOCYTES # BLD AUTO: 1.72 THOUSANDS/ÂΜL (ref 0.6–4.47)
LYMPHOCYTES NFR BLD AUTO: 23 % (ref 14–44)
MCH RBC QN AUTO: 30.6 PG (ref 26.8–34.3)
MCHC RBC AUTO-ENTMCNC: 32.4 G/DL (ref 31.4–37.4)
MCV RBC AUTO: 95 FL (ref 82–98)
MONOCYTES # BLD AUTO: 0.84 THOUSAND/ÂΜL (ref 0.17–1.22)
MONOCYTES NFR BLD AUTO: 11 % (ref 4–12)
NEUTROPHILS # BLD AUTO: 4.72 THOUSANDS/ÂΜL (ref 1.85–7.62)
NEUTS SEG NFR BLD AUTO: 62 % (ref 43–75)
NRBC BLD AUTO-RTO: 0 /100 WBCS
PLATELET # BLD AUTO: 278 THOUSANDS/UL (ref 149–390)
PMV BLD AUTO: 10.1 FL (ref 8.9–12.7)
POTASSIUM SERPL-SCNC: 4.2 MMOL/L (ref 3.5–5.3)
PROT SERPL-MCNC: 6.7 G/DL (ref 6.4–8.4)
PROTHROMBIN TIME: 17.1 SECONDS (ref 11.6–14.5)
QRS AXIS: 87 DEGREES
QRSD INTERVAL: 86 MS
QT INTERVAL: 334 MS
QTC INTERVAL: 475 MS
RBC # BLD AUTO: 3.27 MILLION/UL (ref 3.81–5.12)
SODIUM SERPL-SCNC: 132 MMOL/L (ref 135–147)
T WAVE AXIS: -16 DEGREES
TSH SERPL DL<=0.05 MIU/L-ACNC: 2.44 UIU/ML (ref 0.45–4.5)
VENTRICULAR RATE: 122 BPM
WBC # BLD AUTO: 7.61 THOUSAND/UL (ref 4.31–10.16)

## 2023-08-04 PROCEDURE — 85379 FIBRIN DEGRADATION QUANT: CPT

## 2023-08-04 PROCEDURE — 99223 1ST HOSP IP/OBS HIGH 75: CPT | Performed by: INTERNAL MEDICINE

## 2023-08-04 PROCEDURE — 93005 ELECTROCARDIOGRAM TRACING: CPT

## 2023-08-04 PROCEDURE — 71046 X-RAY EXAM CHEST 2 VIEWS: CPT

## 2023-08-04 PROCEDURE — 93010 ELECTROCARDIOGRAM REPORT: CPT | Performed by: INTERNAL MEDICINE

## 2023-08-04 PROCEDURE — 96376 TX/PRO/DX INJ SAME DRUG ADON: CPT

## 2023-08-04 PROCEDURE — 84484 ASSAY OF TROPONIN QUANT: CPT

## 2023-08-04 PROCEDURE — 99285 EMERGENCY DEPT VISIT HI MDM: CPT | Performed by: EMERGENCY MEDICINE

## 2023-08-04 PROCEDURE — 84443 ASSAY THYROID STIM HORMONE: CPT

## 2023-08-04 PROCEDURE — 71275 CT ANGIOGRAPHY CHEST: CPT

## 2023-08-04 PROCEDURE — 82948 REAGENT STRIP/BLOOD GLUCOSE: CPT

## 2023-08-04 PROCEDURE — 96366 THER/PROPH/DIAG IV INF ADDON: CPT

## 2023-08-04 PROCEDURE — 83880 ASSAY OF NATRIURETIC PEPTIDE: CPT

## 2023-08-04 PROCEDURE — 85610 PROTHROMBIN TIME: CPT

## 2023-08-04 PROCEDURE — G1004 CDSM NDSC: HCPCS

## 2023-08-04 PROCEDURE — 99285 EMERGENCY DEPT VISIT HI MDM: CPT

## 2023-08-04 PROCEDURE — 36415 COLL VENOUS BLD VENIPUNCTURE: CPT

## 2023-08-04 PROCEDURE — 96365 THER/PROPH/DIAG IV INF INIT: CPT

## 2023-08-04 PROCEDURE — 85025 COMPLETE CBC W/AUTO DIFF WBC: CPT

## 2023-08-04 PROCEDURE — 80053 COMPREHEN METABOLIC PANEL: CPT

## 2023-08-04 RX ORDER — INSULIN LISPRO 100 [IU]/ML
1-5 INJECTION, SOLUTION INTRAVENOUS; SUBCUTANEOUS
Status: DISCONTINUED | OUTPATIENT
Start: 2023-08-05 | End: 2023-08-09 | Stop reason: HOSPADM

## 2023-08-04 RX ORDER — OXYCODONE HYDROCHLORIDE 5 MG/1
5 TABLET ORAL ONCE
Status: COMPLETED | OUTPATIENT
Start: 2023-08-04 | End: 2023-08-04

## 2023-08-04 RX ORDER — DULOXETIN HYDROCHLORIDE 30 MG/1
30 CAPSULE, DELAYED RELEASE ORAL DAILY
Status: DISCONTINUED | OUTPATIENT
Start: 2023-08-05 | End: 2023-08-09 | Stop reason: HOSPADM

## 2023-08-04 RX ORDER — OXYCODONE HYDROCHLORIDE 5 MG/1
5 TABLET ORAL EVERY 4 HOURS PRN
COMMUNITY
Start: 2023-08-01

## 2023-08-04 RX ORDER — POLYETHYLENE GLYCOL 3350 17 G/17G
17 POWDER, FOR SOLUTION ORAL DAILY PRN
Status: DISCONTINUED | OUTPATIENT
Start: 2023-08-04 | End: 2023-08-09 | Stop reason: HOSPADM

## 2023-08-04 RX ORDER — POLYETHYLENE GLYCOL 3350
17 POWDER (GRAM) MISCELLANEOUS 2 TIMES DAILY PRN
COMMUNITY
Start: 2023-08-01 | End: 2023-08-08

## 2023-08-04 RX ORDER — FAMOTIDINE 20 MG/1
20 TABLET, FILM COATED ORAL EVERY OTHER DAY
Status: DISCONTINUED | OUTPATIENT
Start: 2023-08-07 | End: 2023-08-09 | Stop reason: HOSPADM

## 2023-08-04 RX ORDER — DILTIAZEM HYDROCHLORIDE 5 MG/ML
15 INJECTION INTRAVENOUS ONCE
Status: COMPLETED | OUTPATIENT
Start: 2023-08-04 | End: 2023-08-04

## 2023-08-04 RX ORDER — ACETAMINOPHEN 650 MG/1
650 TABLET, FILM COATED, EXTENDED RELEASE ORAL 3 TIMES DAILY
COMMUNITY
Start: 2023-08-01

## 2023-08-04 RX ORDER — OXYCODONE HYDROCHLORIDE 5 MG/1
5 TABLET ORAL EVERY 4 HOURS PRN
Status: DISCONTINUED | OUTPATIENT
Start: 2023-08-04 | End: 2023-08-09 | Stop reason: HOSPADM

## 2023-08-04 RX ORDER — LOSARTAN POTASSIUM 50 MG/1
50 TABLET ORAL 2 TIMES DAILY
Status: DISCONTINUED | OUTPATIENT
Start: 2023-08-04 | End: 2023-08-09 | Stop reason: HOSPADM

## 2023-08-04 RX ORDER — ROPINIROLE 0.25 MG/1
0.5 TABLET, FILM COATED ORAL
Status: DISCONTINUED | OUTPATIENT
Start: 2023-08-04 | End: 2023-08-09 | Stop reason: HOSPADM

## 2023-08-04 RX ORDER — EZETIMIBE 10 MG/1
10 TABLET ORAL DAILY
Status: DISCONTINUED | OUTPATIENT
Start: 2023-08-05 | End: 2023-08-09 | Stop reason: HOSPADM

## 2023-08-04 RX ORDER — GABAPENTIN 300 MG/1
300 CAPSULE ORAL
Status: DISCONTINUED | OUTPATIENT
Start: 2023-08-04 | End: 2023-08-09 | Stop reason: HOSPADM

## 2023-08-04 RX ORDER — SENNOSIDES 8.6 MG/1
8.6 TABLET ORAL 2 TIMES DAILY PRN
COMMUNITY
Start: 2023-08-01

## 2023-08-04 RX ORDER — ACETAMINOPHEN 325 MG/1
650 TABLET ORAL EVERY 6 HOURS PRN
Status: DISCONTINUED | OUTPATIENT
Start: 2023-08-04 | End: 2023-08-09 | Stop reason: HOSPADM

## 2023-08-04 RX ORDER — ZOLPIDEM TARTRATE 5 MG/1
10 TABLET ORAL
Status: DISCONTINUED | OUTPATIENT
Start: 2023-08-04 | End: 2023-08-09 | Stop reason: HOSPADM

## 2023-08-04 RX ORDER — SENNOSIDES A AND B 8.6 MG/1
17.2 TABLET, FILM COATED ORAL 2 TIMES DAILY PRN
COMMUNITY
Start: 2023-08-01 | End: 2023-08-11

## 2023-08-04 RX ORDER — AMLODIPINE BESYLATE 5 MG/1
5 TABLET ORAL
Status: DISCONTINUED | OUTPATIENT
Start: 2023-08-04 | End: 2023-08-06

## 2023-08-04 RX ORDER — SENNOSIDES 8.6 MG
1 TABLET ORAL 2 TIMES DAILY
Status: DISCONTINUED | OUTPATIENT
Start: 2023-08-04 | End: 2023-08-09 | Stop reason: HOSPADM

## 2023-08-04 RX ADMIN — OXYCODONE HYDROCHLORIDE 5 MG: 5 TABLET ORAL at 22:11

## 2023-08-04 RX ADMIN — ROPINIROLE 0.5 MG: 0.25 TABLET, FILM COATED ORAL at 21:34

## 2023-08-04 RX ADMIN — DILTIAZEM HYDROCHLORIDE 15 MG: 5 INJECTION INTRAVENOUS at 14:47

## 2023-08-04 RX ADMIN — DILTIAZEM HYDROCHLORIDE 10 MG/HR: 5 INJECTION, SOLUTION INTRAVENOUS at 19:55

## 2023-08-04 RX ADMIN — OXYCODONE HYDROCHLORIDE 5 MG: 5 TABLET ORAL at 14:43

## 2023-08-04 RX ADMIN — DILTIAZEM HYDROCHLORIDE 15 MG/HR: 5 INJECTION INTRAVENOUS at 19:53

## 2023-08-04 RX ADMIN — ZOLPIDEM TARTRATE 10 MG: 5 TABLET ORAL at 21:34

## 2023-08-04 RX ADMIN — AMLODIPINE BESYLATE 5 MG: 5 TABLET ORAL at 19:58

## 2023-08-04 RX ADMIN — ACETAMINOPHEN 650 MG: 325 TABLET, FILM COATED ORAL at 22:15

## 2023-08-04 RX ADMIN — GABAPENTIN 300 MG: 300 CAPSULE ORAL at 21:34

## 2023-08-04 RX ADMIN — IOHEXOL 80 ML: 350 INJECTION, SOLUTION INTRAVENOUS at 15:39

## 2023-08-04 RX ADMIN — DILTIAZEM HYDROCHLORIDE 5 MG/HR: 5 INJECTION INTRAVENOUS at 14:48

## 2023-08-04 RX ADMIN — DILTIAZEM HYDROCHLORIDE 12.5 MG/HR: 5 INJECTION, SOLUTION INTRAVENOUS at 22:12

## 2023-08-04 RX ADMIN — APIXABAN 5 MG: 5 TABLET, FILM COATED ORAL at 19:58

## 2023-08-04 NOTE — ASSESSMENT & PLAN NOTE
Lab Results   Component Value Date    HGBA1C 6.6 (H) 07/03/2023       No results for input(s): "POCGLU" in the last 72 hours.     Blood Sugar Average: Last 72 hrs:  · Not on medication  · Continue carbohydrate controlled diet  · Continue sliding scale  · Glucose check per protocol

## 2023-08-04 NOTE — ASSESSMENT & PLAN NOTE
Blood Pressure: 128/76  · Acceptable  · Continue home meds amlodipine 5 mg once a day after dinner, losartan 50 milligrams twice daily  · Holding home metoprolol succinate in the setting of A-fib with RVR currently on Cardizem drip likely transition tomorrow.

## 2023-08-04 NOTE — TELEPHONE ENCOUNTER
Daughter called,said for the last two days, Trinh Bellamy has been very fatigued, exhausted and feels fluttering in chest.      She had knee revision surgery on 7/31. I spoke with the device clinic, who said they did get a transmission today, in A fib, rapid,  and a report was sent to you. Daughter said she does get up and walk about every 30 mins post op but does get winded quickly. Not lightheaded. She does not see evidence of chf, no edema or fluid buildup anywhere. Has not been checking BP at home. She is taking   Eliquis 5 mg BID  Toprol Xl 75 mg BID  Losartan 50 mg  BID  Lasix 20 mg PRN  ( has not needed recently)  Amlodipine 5 mg daily    Please advise.

## 2023-08-04 NOTE — ED PROVIDER NOTES
History  Chief Complaint   Patient presents with   • Palpitations     Pt came home from surgery post knee replacement on Tuesday. Pt reports since to have started with chest palpitations and JOHNSON. Pt called cardiologist and states with pacemaker confirmed pt is in AFIB and was advised to come to ED to be evaluated      Sowmya Campa is a 77 y.o. female who identifies as female    They presented to the emergency department on August 4, 2023. Patient presents with:  Palpitations: The patient states that she has had worsening palpitations for the past 2 days following a knee surgery. Patient called her primary care doctor who was able to assess her heart rhythm wirelessly via her pacemaker and found her in afib. The PCP told the patient to come to the ED Patient denies chest pain, lightheadedness, blurred vision, dizziness, difficulty breathing, shortness of breath, abdominal pain, NVD.     Allergies include:  -- Sotalol -- Other (See Comments)   --  Prolonged QTc leading to torsades de pointes  -- Penicillins -- Other (See Comments)   --  As a child calcium deposit in the arm  -- Ace Inhibitors -- GI Intolerance   --  Did feel well on it  -- Omeprazole -- Abdominal Pain, Rash and Vomiting   --  stomach pain, vomiting, rash      Immunizations:    Immunization History  Administered            Date(s) Administered   COVID-19 MODERNA VACC 0.5 ML IM                         01/27/2021 02/24/2021     INFLUENZA             12/23/2015  11/07/2016  10/18/2017                           12/04/2018  09/19/2022     Influenza Split High Dose Preservative Free IM                         10/01/2014  12/23/2015  11/07/2016                           10/18/2017     Influenza, high dose seasonal 0.7 mL                         12/04/2018 09/26/2019 09/08/2020 11/03/2021 09/19/2022     Influenza, seasonal, injectable                         10/16/2012     Pneumococcal Conjugate 13-Valent 2015     Pneumococcal Polysaccharide PPV23                         2022    Immunizations Reviewed. Prior to Admission Medications   Prescriptions Last Dose Informant Patient Reported? Taking? Arthritis Pain Relief 650 MG CR tablet   Yes No   Sig: Take 650 mg by mouth 3 (three) times a day   Cholecalciferol 50 MCG (2000 UT) CAPS  Self Yes No   Sig: Take 2,000 Units by mouth 2 (two) times a day     Patient not taking: Reported on 7/3/2023   Chromium Picolinate (CHROMIUM PICOLATE PO)  Self Yes No   Sig: Take 1 tablet by mouth daily   DULoxetine (CYMBALTA) 30 mg delayed release capsule  Self No No   Sig: take 1 capsule by mouth once daily   Nel-diamond 8.6 MG tablet   Yes No   Sig: Take 8.6 mg by mouth 2 (two) times a day as needed   Polyethylene Glycol 3350 POWD   Yes Yes   Sig: Take 17 g by mouth 2 (two) times a day as needed   TURMERIC PO  Self Yes No   Sig: Take 1 capsule by mouth 2 (two) times a day   amLODIPine (NORVASC) 5 mg tablet  Self No No   Sig: Take 1 tablet (5 mg total) by mouth daily   apixaban (ELIQUIS) 5 mg  Self No No   Sig: Take 1 tablet (5 mg total) by mouth 2 (two) times a day   ascorbic acid (VITAMIN C) 500 mg tablet  Self Yes No   Sig: Take 500 mg by mouth daily    ezetimibe (ZETIA) 10 mg tablet   No No   Sig: take 1 tablet by mouth once daily   famotidine (PEPCID) 20 mg tablet  Self Yes No   Sig: Take 20 mg by mouth every other day     furosemide (LASIX) 20 mg tablet  Self No No   Sig: take 1 tablet by mouth daily if needed for shortness of breath WEIGHT GAIN 3 LBS IN 1 DAY OR LOWER LEG SWELLING   gabapentin (NEURONTIN) 300 mg capsule  Self No No   Sig: Take 1 capsule (300 mg total) by mouth daily at bedtime   ipratropium (ATROVENT) 0.03 % nasal spray  Self No No   Si sprays into each nostril 3 (three) times a day Begin once per day, then progress to twice per day. Progress to three times a day as tolerated.    lidocaine (XYLOCAINE) 5 % ointment  Self No No   Sig: Apply topically as needed for mild pain   losartan (COZAAR) 50 mg tablet  Self No No   Sig: Take 1 tablet (50 mg total) by mouth 2 (two) times a day   metoprolol succinate (TOPROL-XL) 25 mg 24 hr tablet   No No   Sig: Take 3 tablets (75 mg total) by mouth every 12 (twelve) hours   oxyCODONE (ROXICODONE) 5 immediate release tablet   Yes No   Sig: Take 5 mg by mouth every 4 (four) hours as needed   pantoprazole (PROTONIX) 20 mg tablet  Self Yes No   Sig: Take 1 tablet by mouth daily before breakfast   Patient not taking: Reported on 6/26/2023   rOPINIRole (REQUIP) 0.5 mg tablet  Self No No   Sig: Take 1 tablet (0.5 mg total) by mouth daily at bedtime   senna (SENOKOT) 8.6 MG tablet   Yes Yes   Sig: Take 17.2 mg by mouth 2 (two) times a day as needed   traMADol (ULTRAM) 50 mg tablet  Self No No   Sig: Take 1 tablet (50 mg total) by mouth 2 (two) times a day as needed for moderate pain   zolpidem (AMBIEN) 10 mg tablet  Self No No   Sig: Take 1 tablet (10 mg total) by mouth daily at bedtime as needed for sleep      Facility-Administered Medications: None       Past Medical History:   Diagnosis Date   • Actinic keratosis     last assessed - [id number]   • Arthritis    • Atrial fibrillation with rapid ventricular response (HCC)     last assessed - 26Apr2016   • Basal cell carcinoma    • Benign colon polyp     last assessed - 27Apr2015   • CHF (congestive heart failure) ([address] [facility]) 06/2022   • Disease of thyroid gland    • Effusion of knee joint right     Resolved - [id number]   • Esophageal reflux    • Fibromyalgia     last assessed - [id number]   • Fibromyalgia, primary    • GERD (gastroesophageal reflux disease)    • Hypertension    • Palpitations     last assessed - 30Apr2013   • Peroneal tendonitis, right     last assessed - 02Oct2013   • Right lumbar radiculopathy 03/17/2016   • Thyroid cancer [facility])    • Thyroid nodule    • Trochanteric bursitis of left hip 03/09/2018       Past Surgical History:   Procedure Laterality Date • CARDIAC ELECTROPHYSIOLOGY STUDY AND ABLATION  08/2022   • CATARACT EXTRACTION Bilateral    • COLONOSCOPY  03/2018   • COLONOSCOPY W/ POLYPECTOMY  2021    repeat in 5 years   • EYE SURGERY     • HYSTERECTOMY     • JOINT REPLACEMENT Left     knee   • IA NEUROPLASTY &/TRANSPOS MEDIAN NRV CARPAL TUNNE Right 11/14/2019    Procedure: RELEASE CARPAL TUNNEL;  Surgeon: David Jama MD;  Location: [facility];  Service: Orthopedics   • IA TOTAL THYROID LOBECTOMY UNI W/WO ISTHMUSECTOMY Left 12/16/2020    Procedure: Left THYROID LOBECTOMY;  Surgeon: Leigh Ann Harper MD;  Location[facility];  Service: ENT   • RECTAL POLYPECTOMY     • REPLACEMENT TOTAL KNEE Left     last assessed - 27Apr2015   • TONSILLECTOMY     • TOTAL ABDOMINAL HYSTERECTOMY     • TUBAL LIGATION     • US GUIDED THYROID BIOPSY  10/14/2020       Family History   Problem Relation Age of Onset   • Heart disease Mother    • Diabetes Mother    • Heart disease Father    • Coronary artery disease Father    • Stroke Father         cerebrovascular accident   • Heart attack Father         myocardial infarction   • Sudden death Father         scd   • Other Family         Back disorder   • Coronary artery disease Family    • Neuropathy Family    • Osteoporosis Family    • No Known Problems Daughter    • No Known Problems Maternal Grandmother    • No Known Problems Maternal Grandfather    • No Known Problems Paternal Grandmother    • No Known Problems Paternal Grandfather    • Cancer Maternal Uncle    • Breast cancer Maternal Aunt 72   • No Known Problems Son    • No Known Problems Maternal Aunt    • No Known Problems Maternal Aunt    • No Known Problems Maternal Aunt    • No Known Problems Paternal Aunt    • No Known Problems Paternal Aunt    • Anuerysm Neg Hx    • Clotting disorder Neg Hx    • Arrhythmia Neg Hx    • Heart failure Neg Hx      I have reviewed and agree with the history as documented.     E-Cigarette/Vaping   • E-Cigarette Use Never User E-Cigarette/Vaping Substances   • Nicotine No    • THC No    • CBD No    • Flavoring No    • Other No    • Unknown No      Social History     Tobacco Use   • Smoking status: Never   • Smokeless tobacco: Never   Vaping Use   • Vaping Use: Never used   Substance Use Topics   • Alcohol use: Not Currently   • Drug use: Never        Review of Systems   Constitutional: Negative. HENT: Negative. Eyes: Negative. Respiratory: Negative. Cardiovascular: Positive for palpitations. Gastrointestinal: Negative. Endocrine: Negative. Genitourinary: Negative. Musculoskeletal: Negative. Skin: Negative. Allergic/Immunologic: Negative. Neurological: Negative. Hematological: Negative. Psychiatric/Behavioral: Negative. Physical Exam  ED Triage Vitals   Temperature Pulse Respirations Blood Pressure SpO2   08/04/23 1312 08/04/23 1312 08/04/23 1312 08/04/23 1312 08/04/23 1312   97.6 °F (36.4 °C) (!) 131 20 114/78 100 %      Temp Source Heart Rate Source Patient Position - Orthostatic VS BP Location FiO2 (%)   08/04/23 1312 08/04/23 1312 08/04/23 1312 08/04/23 1312 --   Oral Monitor Sitting Right arm       Pain Score       08/04/23 1443       4             Orthostatic Vital Signs  Vitals:    08/04/23 1645 08/04/23 1715 08/04/23 1730 08/04/23 1745   BP: 123/72 125/84 135/75 128/76   Pulse: 105 (!) 116 (!) 115 104   Patient Position - Orthostatic VS:           Physical Exam  Constitutional:       Appearance: Normal appearance. She is normal weight. HENT:      Head: Normocephalic and atraumatic. Right Ear: External ear normal.      Left Ear: External ear normal.      Nose: Nose normal.      Mouth/Throat:      Pharynx: Oropharynx is clear. Eyes:      Extraocular Movements: Extraocular movements intact. Cardiovascular:      Rate and Rhythm: Normal rate. Rhythm irregular. Pulses: Normal pulses.    Pulmonary:      Effort: Pulmonary effort is normal.      Breath sounds: Normal breath sounds. Abdominal:      Palpations: Abdomen is soft. Musculoskeletal:         General: Normal range of motion. Cervical back: Normal range of motion. Skin:     General: Skin is warm. Capillary Refill: Capillary refill takes less than 2 seconds. Neurological:      General: No focal deficit present. Mental Status: She is alert and oriented to person, place, and time. Psychiatric:         Mood and Affect: Mood normal.         Behavior: Behavior normal.         ED Medications  Medications   diltiazem (CARDIZEM) injection 15 mg (15 mg Intravenous Given 8/4/23 1447)   diltiazem (CARDIZEM) 125 mg in sodium chloride 0.9 % 125 mL infusion (15 mg/hr Intravenous Rate/Dose Change 8/4/23 1604)   oxyCODONE (ROXICODONE) IR tablet 5 mg (5 mg Oral Given 8/4/23 1443)   iohexol (OMNIPAQUE) 350 MG/ML injection (SINGLE-DOSE) 80 mL (80 mL Intravenous Given 8/4/23 1539)       Diagnostic Studies  Results Reviewed     Procedure Component Value Units Date/Time    TSH, 3rd generation with Free T4 reflex [[id number]] Collected: 08/04/23 1340    Lab Status:  In process Specimen: Blood from Arm, Left Updated: 08/04/23 1807    HS Troponin I 2hr [[id number]]  (Normal) Collected: 08/04/23 1549    Lab Status: Final result Specimen: Blood from Arm, Left Updated: 08/04/23 1618     hs TnI 2hr 14 ng/L      Delta 2hr hsTnI -2 ng/L     HS Troponin I 4hr [[id number]]     Lab Status: No result Specimen: Blood     Protime-INR [[id number]]  (Abnormal) Collected: 08/04/23 1340    Lab Status: Final result Specimen: Blood from Arm, Left Updated: 08/04/23 1531     Protime 17.1 seconds      INR 1.37    B-Type Natriuretic Peptide(BNP) [[id number]]  (Abnormal) Collected: 08/04/23 1340    Lab Status: Final result Specimen: Blood from Arm, Left Updated: 08/04/23 1425      pg/mL     HS Troponin 0hr (reflex protocol) [[id number]]  (Normal) Collected: 08/04/23 1340    Lab Status: Final result Specimen: Blood from Arm, Left Updated: 08/04/23 1423 hs TnI 0hr 16 ng/L     D-dimer, quantitative [[id number]]  (Abnormal) Collected: 08/04/23 1340    Lab Status: Final result Specimen: Blood from Arm, Left Updated: 08/04/23 1417     D-Dimer, Quant 1.44 ug/ml FEU     Narrative: In the evaluation for possible pulmonary embolism, in the appropriate (Well's Score of 4 or less) patient, the age adjusted d-dimer cutoff for this patient can be calculated as:    Age x 0.01 (in ug/mL) for Age-adjusted D-dimer exclusion threshold for a patient over 50 years.     Comprehensive metabolic panel [[id number]]  (Abnormal) Collected: 08/04/23 1340    Lab Status: Final result Specimen: Blood from Arm, Left Updated: 08/04/23 1413     Sodium 132 mmol/L      Potassium 4.2 mmol/L      Chloride 98 mmol/L      CO2 26 mmol/L      ANION GAP 8 mmol/L      BUN 20 mg/dL      Creatinine 0.79 mg/dL      Glucose 159 mg/dL      Calcium 9.0 mg/dL      AST 17 U/L      ALT 13 U/L      Alkaline Phosphatase 63 U/L      Total Protein 6.7 g/dL      Albumin 3.7 g/dL      Total Bilirubin 1.07 mg/dL      eGFR 71 ml/min/1.73sq m     Narrative:      [place] guidelines for Chronic Kidney Disease (CKD):   •  Stage 1 with normal or high GFR (GFR > 90 mL/min/1.73 square meters)  •  Stage 2 Mild CKD (GFR = 60-89 mL/min/1.73 square meters)  •  Stage 3A Moderate CKD (GFR = 45-59 mL/min/1.73 square meters)  •  Stage 3B Moderate CKD (GFR = 30-44 mL/min/1.73 square meters)  •  Stage 4 Severe CKD (GFR = 15-29 mL/min/1.73 square meters)  •  Stage 5 End Stage CKD (GFR <15 mL/min/1.73 square meters)  Note: GFR calculation is accurate only with a steady state creatinine    CBC and differential [[id number]]  (Abnormal) Collected: 08/04/23 1340    Lab Status: Final result Specimen: Blood from Arm, Left Updated: 08/04/23 1401     WBC 7.61 Thousand/uL      RBC 3.27 Million/uL      Hemoglobin 10.0 g/dL      Hematocrit 30.9 %      MCV 95 fL      MCH 30.6 pg      MCHC 32.4 g/dL      RDW 13.2 % MPV 10.1 fL      Platelets 561 Thousands/uL      nRBC 0 /100 WBCs      Neutrophils Relative 62 %      Immat GRANS % 0 %      Lymphocytes Relative 23 %      Monocytes Relative 11 %      Eosinophils Relative 3 %      Basophils Relative 1 %      Neutrophils Absolute 4.72 Thousands/µL      Immature Grans Absolute 0.03 Thousand/uL      Lymphocytes Absolute 1.72 Thousands/µL      Monocytes Absolute 0.84 Thousand/µL      Eosinophils Absolute 0.24 Thousand/µL      Basophils Absolute 0.06 Thousands/µL                  CTA ED chest PE study   Final Result by Vani Ocasio MD (08/04 9403)      1. No acute pulmonary embolism. 2.  Mildly heterogeneous liver with slight lobulation along the left lobe. Early cirrhosis is not excluded and follow-up with ultrasound is recommended. 3.  Incidental thyroid nodule(s) for which nonemergent thyroid. The study was marked in [facility] for immediate notification. Is recommended. Workstation performed: [id number]         XR chest 2 views   ED Interpretation by Con Leonard MD (08/04 3888)   No signs of acute cardiopulmonary disease      Final Result by Danielle Monte MD (08/04 8294)      No acute cardiopulmonary disease.                   Workstation performed: [id number]               Procedures  ECG 12 Lead Documentation Only    Date/Time: 8/4/2023 1:40 PM    Performed by: Con Leonard MD  Authorized by: Con Leonard MD    Patient location:  ED  Previous ECG:     Previous ECG:  Compared to current    Similarity:  Changes noted    Comparison to cardiac monitor: Yes    Interpretation:     Interpretation: abnormal    Rate:     ECG rate:  122    ECG rate assessment: tachycardic    Rhythm:     Rhythm: atrial fibrillation    Ectopy:     Ectopy: none    QRS:     QRS axis:  Normal  Conduction:     Conduction: normal    ST segments:     ST segments:  Normal  T waves:     T waves: inverted      Inverted:  III and aVF          ED Course  ED Course as of 08/04/23 1821   Fri Aug 04, 2023   1330 Patient assessed and physical exam completed. Patient likley symptomatic due to a-fib with RVR. Lab work ordered and patient also being assessed for PE due to recent surgery   1412 Conversation with patient about cardizem. Will start cardizem bolus followed by infusion   1422 D-Dimer, Quant(!): 1.44  PE study ordered   1657 BNP(!): 651   1657 CT  IMPRESSION:     1. No acute pulmonary embolism.     2.  Mildly heterogeneous liver with slight lobulation along the left lobe. Early cirrhosis is not excluded and follow-up with ultrasound is recommended.     3. Incidental thyroid nodule(s) for which nonemergent thyroid.     The study was marked in EPIC for immediate notification. Is recommended.     Workstation performed: [id number]   4419 CXr  IMPRESSION:     No acute cardiopulmonary disease. HEART Risk Score    Flowsheet Row Most Recent Value   Heart Score Risk Calculator    History 1 Filed at: 08/04/2023 1821   ECG 1 Filed at: 08/04/2023 1821   Age 2 Filed at: 08/04/2023 1821   Risk Factors 1 Filed at: 08/04/2023 1821   Troponin 1 Filed at: 08/04/2023 1821   HEART Score 6 Filed at: 08/04/2023 1821                      SBIRT 22yo+    Flowsheet Row Most Recent Value   Initial Alcohol Screen: US AUDIT-C     1. How often do you have a drink containing alcohol? 0 Filed at: 08/04/2023 1313   3b. FEMALE Any Age, or MALE 65+: How often do you have 4 or more drinks on one occassion? 0 Filed at: 08/04/2023 1313   Audit-C Score 0 Filed at: 08/04/2023 1313   TARA: How many times in the past year have you. .. Used an illegal drug or used a prescription medication for non-medical reasons?  Never Filed at: 08/04/2023 1313          Wells' Criteria for PE    Flowsheet Row Most Recent Value   Wells' Criteria for PE    Clinical signs and symptoms of DVT 0 Filed at: 08/04/2023 1331   PE is primary diagnosis or equally likely 3 Filed at: 08/04/2023 1331   HR >100 1.5 Filed at: 08/04/2023 1331   Immobilization at least 3 days or Surgery in the previous 4 weeks 1.5 Filed at: 08/04/2023 1331   Previous, objectively diagnosed PE or DVT 0 Filed at: 08/04/2023 1331   Hemoptysis 0 Filed at: 08/04/2023 1331   Malignancy with treatment within 6 months or palliative 0 Filed at: 08/04/2023 1331   Wells' Criteria Total 6 Filed at: 08/04/2023 1331            Medical Decision Making  Patient presents with:  Palpitations: Pt came home from surgery post knee replacement on Tuesday. Pt reports since to have started with chest palpitations and JOHNSON. Pt called cardiologist and states with pacemaker confirmed pt is in AFIB and was advised to come to ED to be evaluated       Patient seen and examined and found to be in a-fib with RVR. See ED course for more details. [facility] patient's [facility] who accepted the patient for further evaluation and management. Atrial fibrillation with RVR ([address]): acute illness or injury  Amount and/or Complexity of Data Reviewed  Labs: ordered. Decision-making details documented in ED Course. Radiology: ordered and independent interpretation performed. Risk  Prescription drug management. Decision regarding hospitalization. Disposition  Final diagnoses:   Atrial fibrillation with RVR ([address])     Time reflects when diagnosis was documented in both MDM as applicable and the Disposition within this note     Time User Action Codes Description Comment    8/4/2023  1:44 PM Slade Camejo Add [I48.91] Atrial fibrillation with RVR [facility])       ED Disposition     ED Disposition   Admit    Condition   Stable    Date/Time   Fri Aug 4, 2023  5:20 PM    Comment   Case was discussed with Dr. Michelle Soto and the patient's admission status was agreed to be Admission Status: inpatient status to the service of Dr. eKlin Ramírez .            Follow-up Information    None         Patient's Medications   Discharge Prescriptions No medications on file     No discharge procedures on file. PDMP Review       Value Time User    PDMP Reviewed  Yes 3/28/2023  4:52 PM Maricruz Hernandez MD           ED Provider  Attending physically available and evaluated Eddi Mead. I managed the patient along with the ED Attending.     Electronically Signed by         Era Whitehead MD  08/04/23 7204

## 2023-08-04 NOTE — LETTER
Thank you for allowing us to participate in the care of your patient, Sowmya Campa, who was hospitalized from 8/4/23 through 8/9/2023 with the admitting diagnosis of afib. Ruy Goetz was successfully cardioverted in the hospital and plans to follow up with her cardiologist.      If you have any additional questions or would like to discuss further, please feel free to contact me.     Jayshree Richard MD  [facility] Internal Medicine, Hospitalist  [phone]

## 2023-08-04 NOTE — ASSESSMENT & PLAN NOTE
Wt Readings from Last 3 Encounters:   07/03/23 73.9 kg (163 lb)   06/26/23 73.1 kg (161 lb 2 oz)   06/08/23 74.8 kg (165 lb)   · Last echo June 2023 preop evaluation.   LVEF 55%, G2DD  · Does not seem to be in exacerbation  · Home regimen Lasix 20 mg as needed was recommended by Dr. Hilda Raines cardiology on the outpatient setting if needed can increase to 40 mg    · Plan    · Hold Lasix at this time  · Daily weights, strict I's and O  · Sodium 2 g, restriction fluid 2 L

## 2023-08-04 NOTE — H&P
[address] [facility]  H&P  Name: Jean Claude Hager 77 y.o. female I MRN: [id number]  Unit/Bed#: S -Janki I Date of Admission: 8/4/2023   Date of Service: 8/4/2023 I Hospital Day: 0      Assessment/Plan   * A-fib with RVR ([address])  Assessment & Plan  · POA 2-day history of palpitations, fatigue, exertional dyspnea. · Status post left knee replacement revision on 07/31/2023  · History of paroxysmal A-fib previously ablated 2x, last ablation summer 2022 with subsequent amiodarone treatment for 60-day. · 08/04/2023 Atrial pacemaker interrogation atrial paced device clinic interrogated today " 27 VHRS & 3 FAST A/V NOTED. 3 AT/AF NOTED; 100% BURDEN. AVAIL EGRAMS PRESENT AS AFIB W/RVR RATES >140 BPM."  · At the ED ECG A-fib with RVR  · Subsequently started on Cardizem drip with somewhat improvement in symptoms however continues with shortness of breath on exertion. · [id number]  · Likely in the setting postoperative trigger with somewhat superimposed uncontrolled pain    · Plan  · Check thyroid function especially in the setting of thyroid nodule findings  · Hold home metoprolol succinate at this time consider resuming tomorrow as her home dose  · Cardiology consulted appreciate recommendation  · Wean off Cardizem as able. · Continue Eliquis 5 mg twice daily  · Continue telemetry    Hyponatremia  Assessment & Plan  · POA sodium noted 132, corrected for glucose 133 mild  · Likely in the setting of recent surgical procedure  · BMP a.m. Chronic diastolic CHF (congestive heart failure) (HCC)  Assessment & Plan  Wt Readings from Last 3 Encounters:   07/03/23 73.9 kg (163 lb)   06/26/23 73.1 kg (161 lb 2 oz)   06/08/23 74.8 kg (165 lb)   · Last echo June 2023 preop evaluation.   LVEF 55%, G2DD  · Does not seem to be in exacerbation  · Home regimen Lasix 20 mg as needed was recommended by Dr. Lukas Hannon cardiology on the outpatient setting if needed can increase to 40 mg    · Plan    · Hold Lasix at this time  · Daily weights, strict I's and O  · Sodium 2 g, restriction fluid 2 L          Essential hypertension  Assessment & Plan  Blood Pressure: 128/76  · Acceptable  · Continue home meds amlodipine 5 mg once a day after dinner, losartan 50 milligrams twice daily  · Holding home metoprolol succinate in the setting of A-fib with RVR currently on Cardizem drip likely transition tomorrow. Type 2 diabetes mellitus without complication, without long-term current use of insulin (HCC)  Assessment & Plan  Lab Results   Component Value Date    HGBA1C 6.6 (H) 07/03/2023       No results for input(s): "POCGLU" in the last 72 hours. Blood Sugar Average: Last 72 hrs:  · Not on medication  · Continue carbohydrate controlled diet  · Continue sliding scale  · Glucose check per protocol    Anemia  Assessment & Plan  · POA 10.0, baseline seems to be between 13-14  · Likely postprocedure  · No overt bleeding, no melena no hematochezia  · CBC a.m. Abnormal CT of the chest  Assessment & Plan  · 08/04/2023 CTA PE "Mildly heterogeneous liver with slight lobulation along the left lobe. Early cirrhosis is not excluded and follow-up with ultrasound is recommended. There is a 5 mm left lower lobe nodule on series 304 image 192, stable from 2021. There is no tracheal or endobronchial lesion. Incidental discovery of one or more thyroid nodule(s) measuring more than 1.5 cm and without suspicious features "  · Check right upper quadrant ultrasound while inpatient  · Recommend follow-up with primary care provider for nonurgent thyroid ultrasound       VTE Pharmacologic Prophylaxis: VTE Score: 10 High Risk (Score >/= 5) - Pharmacological DVT Prophylaxis Ordered: apixaban (Eliquis). Sequential Compression Devices Ordered. Code Status: Level 3 - DNAR and DNI patient and daughter  Discussion with family: Updated  (daughter) at bedside.     Anticipated Length of Stay: Patient will be admitted on an inpatient basis with an anticipated length of stay of greater than 2 midnights secondary to A-fib RVR. Chief Complaint: Fatigue, exertional dyspnea    History of Present Illness:  Didier Vazquez is a 77 y.o. female with a PMH of paroxysmal A-fib on Eliquis, atrial pacemaker, hypertension, HFpEF, fibromyalgia, thyroid cancer status post left lobectomy 2020, osteoarthritis, who is currently status post left knee replacement revision on 07/31/23. Endorse 2-day history fatigue, exertional dyspnea with associated shortness of breath and palpitations. Patient denies any chest pain, lightheadedness or dizziness. Patient endorsed that has been compliant with her medications even during pre and postop. The only medication that she held was her multivitamins and Eliquis as instructed. Endorse that has had 2 ablation last ablation summer 2022 subsequently was treated with amiodarone for 60 days. Follows diligently with cardiology on the outpatient setting last appointment June 2023 as a preop visit. Daughter endorsed that given her symptoms and prior history contacted cardiology clinic for which subsequently interrogated her pacemaker lead found patient was on A-fib with RVR instructed her to come get evaluated the emergency department. Patient denies caffeine use, no alcohol use, had a sleep study done for which was negative. Denies any history of transfusion, no hepatitis B or C infection as well as no IV drug use. Denies any changes in her weight, no lower extremity edema. Endorsed that her left knee has had somewhat improvement in swelling, pain is 2/10 well-controlled. Denies any headaches, abdominal pain as well as no urinary symptoms, no changes in appetite no fluctuations in her weight. At the ED laboratory were remarkable for , D-dimer 1.44 however chest x-ray was unremarkable for effusions, pulmonary vascular congestion.   Patient also had a CTA PE did not show acute PE however did show mild heterogenous liver with lobulated left lobe concerning for early cirrhosis. Incidental thyroid nodules finding without urgent follow-up. Review of Systems:  Review of Systems   Constitutional: Positive for activity change. Negative for appetite change, chills and fever. HENT: Negative for ear pain and sore throat. Eyes: Negative for pain and visual disturbance. Respiratory: Positive for shortness of breath. Negative for cough. Cardiovascular: Positive for palpitations. Negative for chest pain and leg swelling. Gastrointestinal: Negative for abdominal distention, abdominal pain, constipation, diarrhea, nausea and vomiting. Genitourinary: Negative for difficulty urinating, dysuria, frequency, hematuria and urgency. Musculoskeletal: Positive for joint swelling. Negative for arthralgias and back pain. Left knee edema. Status post left knee revision   Skin: Positive for wound. Negative for color change and rash. Neurological: Negative for dizziness, seizures, syncope and light-headedness. All other systems reviewed and are negative.       Past Medical and Surgical History:   Past Medical History:   Diagnosis Date   • Actinic keratosis     last assessed - [id number]   • Arthritis    • Atrial fibrillation with rapid ventricular response ([address])     last assessed - 26Apr2016   • Basal cell carcinoma    • Benign colon polyp     last assessed - 27Apr2015   • CHF (congestive heart failure) ([address]) 06/2022   • Disease of thyroid gland    • Effusion of knee joint right     Resolved - [id number]   • Esophageal reflux    • Fibromyalgia     last assessed - [id number]   • Fibromyalgia, primary    • GERD (gastroesophageal reflux disease)    • Hypertension    • Palpitations     last assessed - 30Apr2013   • Peroneal tendonitis, right     last assessed - [id number]   • Right lumbar radiculopathy 03/17/2016   • Thyroid cancer [facility])    • Thyroid nodule    • Trochanteric bursitis of left hip 03/09/2018       Past Surgical History: Procedure Laterality Date   • CARDIAC ELECTROPHYSIOLOGY STUDY AND ABLATION  08/2022   • CATARACT EXTRACTION Bilateral    • COLONOSCOPY  03/2018   • COLONOSCOPY W/ POLYPECTOMY  2021    repeat in 5 years   • EYE SURGERY     • HYSTERECTOMY     • JOINT REPLACEMENT Left     knee   • IA NEUROPLASTY &/TRANSPOS MEDIAN NRV CARPAL TUNNE Right 11/14/2019    Procedure: RELEASE CARPAL TUNNEL;  Surgeon: Peewee Garsia MD;  Location: [facility];  Service: Orthopedics   • IA TOTAL THYROID LOBECTOMY UNI W/WO ISTHMUSECTOMY Left 12/16/2020    Procedure: Left THYROID LOBECTOMY;  Surgeon: Tamar Alcantara MD;  Location: [facility];  Service: ENT   • RECTAL POLYPECTOMY     • REPLACEMENT TOTAL KNEE Left     last assessed - 27Apr2015   • TONSILLECTOMY     • TOTAL ABDOMINAL HYSTERECTOMY     • TUBAL LIGATION     • US GUIDED THYROID BIOPSY  10/14/2020       Meds/Allergies:  Prior to Admission medications    Medication Sig Start Date End Date Taking?  Authorizing Provider   amLODIPine (NORVASC) 5 mg tablet Take 1 tablet (5 mg total) by mouth daily 12/27/22  Yes JENNY Bennett   apixaban (ELIQUIS) 5 mg Take 1 tablet (5 mg total) by mouth 2 (two) times a day 5/16/23  Yes Nir Cunningham DO   Arthritis Pain Relief 650 MG CR tablet Take 650 mg by mouth 3 (three) times a day 8/1/23  Yes Historical Provider, MD   ascorbic acid (VITAMIN C) 500 mg tablet Take 500 mg by mouth daily    Yes Historical Provider, MD   Cholecalciferol 50 MCG (2000 UT) CAPS Take 2,000 Units by mouth 2 (two) times a day   Yes Historical Provider, MD   Chromium Picolinate (CHROMIUM PICOLATE PO) Take 1 tablet by mouth daily   Yes Historical Provider, MD   DULoxetine (CYMBALTA) 30 mg delayed release capsule take 1 capsule by mouth once daily 6/14/23  Yes Xavi Rodriguez MD   ezetimibe (ZETIA) 10 mg tablet take 1 tablet by mouth once daily 7/10/23  Yes Xavi Rodriguez MD   furosemide (LASIX) 20 mg tablet take 1 tablet by mouth daily if needed for shortness of breath WEIGHT GAIN 3 LBS IN 1 DAY OR LOWER LEG SWELLING 1/3/23  Yes JENNY Davidson   gabapentin (NEURONTIN) 300 mg capsule Take 1 capsule (300 mg total) by mouth daily at bedtime 2/28/23  Yes Blank Curry MD   Nel-diamond 8.6 MG tablet Take 8.6 mg by mouth 2 (two) times a day as needed 8/1/23  Yes Historical Provider, MD   ipratropium (ATROVENT) 0.03 % nasal spray 2 sprays into each nostril 3 (three) times a day Begin once per day, then progress to twice per day. Progress to three times a day as tolerated.  2/15/22  Yes Fredi Jacobsen PA-C   losartan (COZAAR) 50 mg tablet Take 1 tablet (50 mg total) by mouth 2 (two) times a day 2/14/23  Yes Nir Cunningham DO   metoprolol succinate (TOPROL-XL) 25 mg 24 hr tablet Take 3 tablets (75 mg total) by mouth every 12 (twelve) hours 7/3/23 8/4/23 Yes Blank Curry MD   oxyCODONE (ROXICODONE) 5 immediate release tablet Take 5 mg by mouth every 4 (four) hours as needed 8/1/23  Yes Historical Provider, MD   Polyethylene Glycol 3350 POWD Take 17 g by mouth 2 (two) times a day as needed 8/1/23 8/8/23 Yes Historical Provider, MD   rOPINIRole (REQUIP) 0.5 mg tablet Take 1 tablet (0.5 mg total) by mouth daily at bedtime 4/27/23  Yes Blank Curry MD   senna (SENOKOT) 8.6 MG tablet Take 17.2 mg by mouth 2 (two) times a day as needed 8/1/23 8/8/23 Yes Historical Provider, MD   traMADol (ULTRAM) 50 mg tablet Take 1 tablet (50 mg total) by mouth 2 (two) times a day as needed for moderate pain 5/31/23  Yes Blank Curry MD   zolpidem (AMBIEN) 10 mg tablet Take 1 tablet (10 mg total) by mouth daily at bedtime as needed for sleep 2/23/23  Yes Blank Curry MD   famotidine (PEPCID) 20 mg tablet Take 20 mg by mouth every other day      Historical Provider, MD   lidocaine (XYLOCAINE) 5 % ointment Apply topically as needed for mild pain 1/3/22   JENNY Swan   pantoprazole (PROTONIX) 20 mg tablet Take 1 tablet by mouth daily before breakfast  Patient not taking: Reported on 6/26/2023 8/5/22   Historical Provider, MD   TURMERIC PO Take 1 capsule by mouth 2 (two) times a day    Historical Provider, MD     I have reviewed home medications with patient personally. Allergies: Allergies   Allergen Reactions   • Sotalol Other (See Comments)     Prolonged QTc leading to torsades de pointes    • Penicillins Other (See Comments)     As a child calcium deposit in the arm    • Ace Inhibitors GI Intolerance     Did feel well on it   • Omeprazole Abdominal Pain, Rash and Vomiting     stomach pain, vomiting, rash       Social History:  Marital Status:     Occupation: Retired  Patient Pre-hospital Living Situation: Home  Patient Pre-hospital Level of Mobility: walks with walker  Patient Pre-hospital Diet Restrictions: None  Substance Use History:   Social History     Substance and Sexual Activity   Alcohol Use Not Currently     Social History     Tobacco Use   Smoking Status Never   Smokeless Tobacco Never     Social History     Substance and Sexual Activity   Drug Use Never       Family History:  Family History   Problem Relation Age of Onset   • Heart disease Mother    • Diabetes Mother    • Heart disease Father    • Coronary artery disease Father    • Stroke Father         cerebrovascular accident   • Heart attack Father         myocardial infarction   • Sudden death Father         scd   • Other Family         Back disorder   • Coronary artery disease Family    • Neuropathy Family    • Osteoporosis Family    • No Known Problems Daughter    • No Known Problems Maternal Grandmother    • No Known Problems Maternal Grandfather    • No Known Problems Paternal Grandmother    • No Known Problems Paternal Grandfather    • Cancer Maternal Uncle    • Breast cancer Maternal Aunt 72   • No Known Problems Son    • No Known Problems Maternal Aunt    • No Known Problems Maternal Aunt    • No Known Problems Maternal Aunt    • No Known Problems Paternal Aunt    • No Known Problems Paternal Aunt    • Anuerysm Neg Hx    • Clotting disorder Neg Hx    • Arrhythmia Neg Hx    • Heart failure Neg Hx        Physical Exam:     Vitals:   Blood Pressure: 128/76 (08/04/23 1745)  Pulse: 104 (08/04/23 1745)  Temperature: 97.6 °F (36.4 °C) (08/04/23 1312)  Temp Source: Oral (08/04/23 1312)  Respirations: 18 (08/04/23 1745)  SpO2: 99 % (08/04/23 1745)    Physical Exam  Vitals and nursing note reviewed. Constitutional:       General: She is not in acute distress. Appearance: She is well-developed. She is not ill-appearing. HENT:      Head: Normocephalic and atraumatic. Right Ear: External ear normal.      Left Ear: External ear normal.      Nose: Nose normal.      Mouth/Throat:      Mouth: Mucous membranes are moist.   Eyes:      General: No scleral icterus. Extraocular Movements: Extraocular movements intact. Conjunctiva/sclera: Conjunctivae normal.      Pupils: Pupils are equal, round, and reactive to light. Cardiovascular:      Rate and Rhythm: Tachycardia present. Rhythm irregular. Pulses: Normal pulses. Heart sounds: Normal heart sounds. No murmur heard. Pulmonary:      Effort: Pulmonary effort is normal. No respiratory distress. Breath sounds: Normal breath sounds. Chest:      Chest wall: No tenderness. Abdominal:      General: Bowel sounds are normal. There is no distension. Palpations: Abdomen is soft. Tenderness: There is no abdominal tenderness. Musculoskeletal:         General: No swelling. Cervical back: Normal range of motion and neck supple. Right lower leg: No edema. Left lower leg: No edema. Legs:    Skin:     General: Skin is warm and dry. Capillary Refill: Capillary refill takes less than 2 seconds. Neurological:      Mental Status: She is alert.    Psychiatric:         Mood and Affect: Mood normal.         Additional Data:     Lab Results:  Results from last 7 days   Lab Units 08/04/23  1340   WBC Thousand/uL 7.61   HEMOGLOBIN g/dL 10.0*   HEMATOCRIT % 30.9*   PLATELETS Thousands/uL 278   NEUTROS PCT % 62   LYMPHS PCT % 23   MONOS PCT % 11   EOS PCT % 3     Results from last 7 days   Lab Units 08/04/23  1340   SODIUM mmol/L 132*   POTASSIUM mmol/L 4.2   CHLORIDE mmol/L 98   CO2 mmol/L 26   BUN mg/dL 20   CREATININE mg/dL 0.79   ANION GAP mmol/L 8   CALCIUM mg/dL 9.0   ALBUMIN g/dL 3.7   TOTAL BILIRUBIN mg/dL 1.07*   ALK PHOS U/L 63   ALT U/L 13   AST U/L 17   GLUCOSE RANDOM mg/dL 159*     Results from last 7 days   Lab Units 08/04/23  1340   INR  1.37*                   Lines/Drains:  Invasive Devices     Peripheral Intravenous Line  Duration           Peripheral IV 08/04/23 Left Antecubital <1 day                    Imaging: Reviewed radiology reports from this admission including: CTA PE  CTA ED chest PE study   Final Result by Justin Zapata MD (08/04 8623)      1. No acute pulmonary embolism. 2.  Mildly heterogeneous liver with slight lobulation along the left lobe. Early cirrhosis is not excluded and follow-up with ultrasound is recommended. 3.  Incidental thyroid nodule(s) for which nonemergent thyroid. The study was marked in [facility] for immediate notification. Is recommended. Workstation performed: [id number]         XR chest 2 views   ED Interpretation by Karlos Ortiz MD (08/04 1415)   No signs of acute cardiopulmonary disease      Final Result by Demetri Roberts MD (08/04 1507)      No acute cardiopulmonary disease. Workstation performed: [id number]         US right upper quadrant    (Results Pending)   VAS lower limb venous duplex study, complete bilateral    (Results Pending)       EKG and Other Studies Reviewed on Admission:   · EKG: Atrial fibrillation. . ** Please Note: This note has been constructed using a voice recognition system.  **

## 2023-08-04 NOTE — ASSESSMENT & PLAN NOTE
· POA 10.0, baseline seems to be between 13-14  · Likely postprocedure  · No overt bleeding, no melena no hematochezia  · CBC a.m.

## 2023-08-04 NOTE — INCIDENTAL FINDINGS
The following findings require follow up:  Radiographic finding   Finding:  one or more thyroid nodule(s) measuring more than 1.5 cm and without suspicious features is noted    Follow up required: Ultrasound of thyroid   Follow up should be done within Non emergent     Please notify the following clinician to assist with the follow up:   Dr. Jian Valenzuela

## 2023-08-04 NOTE — ASSESSMENT & PLAN NOTE
· 08/04/2023 CTA PE "Mildly heterogeneous liver with slight lobulation along the left lobe. Early cirrhosis is not excluded and follow-up with ultrasound is recommended. There is a 5 mm left lower lobe nodule on series 304 image 192, stable from 2021. There is no tracheal or endobronchial lesion.  Incidental discovery of one or more thyroid nodule(s) measuring more than 1.5 cm and without suspicious features "  · Check right upper quadrant ultrasound while inpatient  · Recommend follow-up with primary care provider for nonurgent thyroid ultrasound

## 2023-08-04 NOTE — ED ATTENDING ATTESTATION
8/4/2023  I, Andrzej Murray MD, saw and evaluated the patient. I have discussed the patient with the resident/non-physician practitioner and agree with the resident's/non-physician practitioner's findings, Plan of Care, and MDM as documented in the resident's/non-physician practitioner's note, except where noted. All available labs and Radiology studies were reviewed. I was present for key portions of any procedure(s) performed by the resident/non-physician practitioner and I was immediately available to provide assistance. At this point I agree with the current assessment done in the Emergency Department. I have conducted an independent evaluation of this patient a history and physical is as follows:    79-year-old presenting to the ER with palpitations, found to be in A-fib RVR. Recent knee surgery. Feeling dyspneic.     Symptoms likely due to A-fib RVR, PE in differential, agree with cardiac evaluation, D-dimer, Cardizem for rate control      ED Course         Critical Care Time  Procedures

## 2023-08-04 NOTE — ASSESSMENT & PLAN NOTE
· POA sodium noted 132, corrected for glucose 133 mild  · Likely in the setting of recent surgical procedure  · BMP a.m.

## 2023-08-05 ENCOUNTER — HOSPITAL ENCOUNTER (INPATIENT)
Dept: VASCULAR ULTRASOUND | Facility: HOSPITAL | Age: 78
Discharge: HOME | DRG: 309 | End: 2023-08-05
Payer: MEDICARE

## 2023-08-05 ENCOUNTER — APPOINTMENT (INPATIENT)
Dept: ULTRASOUND IMAGING | Facility: HOSPITAL | Age: 78
DRG: 309 | End: 2023-08-05
Payer: MEDICARE

## 2023-08-05 LAB
ANION GAP SERPL CALCULATED.3IONS-SCNC: 8 MMOL/L
BUN SERPL-MCNC: 16 MG/DL (ref 5–25)
CALCIUM SERPL-MCNC: 8.9 MG/DL (ref 8.4–10.2)
CHLORIDE SERPL-SCNC: 101 MMOL/L (ref 96–108)
CO2 SERPL-SCNC: 27 MMOL/L (ref 21–32)
CREAT SERPL-MCNC: 0.71 MG/DL (ref 0.6–1.3)
ERYTHROCYTE [DISTWIDTH] IN BLOOD BY AUTOMATED COUNT: 13.2 % (ref 11.6–15.1)
GFR SERPL CREATININE-BSD FRML MDRD: 81 ML/MIN/1.73SQ M
GLUCOSE SERPL-MCNC: 117 MG/DL (ref 65–140)
GLUCOSE SERPL-MCNC: 134 MG/DL (ref 65–140)
GLUCOSE SERPL-MCNC: 135 MG/DL (ref 65–140)
GLUCOSE SERPL-MCNC: 150 MG/DL (ref 65–140)
GLUCOSE SERPL-MCNC: 207 MG/DL (ref 65–140)
HCT VFR BLD AUTO: 33.4 % (ref 34.8–46.1)
HGB BLD-MCNC: 11.1 G/DL (ref 11.5–15.4)
MAGNESIUM SERPL-MCNC: 2 MG/DL (ref 1.9–2.7)
MCH RBC QN AUTO: 31.3 PG (ref 26.8–34.3)
MCHC RBC AUTO-ENTMCNC: 33.2 G/DL (ref 31.4–37.4)
MCV RBC AUTO: 94 FL (ref 82–98)
PLATELET # BLD AUTO: 279 THOUSANDS/UL (ref 149–390)
PMV BLD AUTO: 9.9 FL (ref 8.9–12.7)
POTASSIUM SERPL-SCNC: 4 MMOL/L (ref 3.5–5.3)
RBC # BLD AUTO: 3.55 MILLION/UL (ref 3.81–5.12)
SODIUM SERPL-SCNC: 136 MMOL/L (ref 135–147)
WBC # BLD AUTO: 6.16 THOUSAND/UL (ref 4.31–10.16)

## 2023-08-05 PROCEDURE — 76705 ECHO EXAM OF ABDOMEN: CPT

## 2023-08-05 PROCEDURE — 93970 EXTREMITY STUDY: CPT | Performed by: SURGERY

## 2023-08-05 PROCEDURE — 99223 1ST HOSP IP/OBS HIGH 75: CPT | Performed by: INTERNAL MEDICINE

## 2023-08-05 PROCEDURE — 82948 REAGENT STRIP/BLOOD GLUCOSE: CPT

## 2023-08-05 PROCEDURE — 80048 BASIC METABOLIC PNL TOTAL CA: CPT

## 2023-08-05 PROCEDURE — 85027 COMPLETE CBC AUTOMATED: CPT

## 2023-08-05 PROCEDURE — 83735 ASSAY OF MAGNESIUM: CPT

## 2023-08-05 PROCEDURE — 99233 SBSQ HOSP IP/OBS HIGH 50: CPT | Performed by: INTERNAL MEDICINE

## 2023-08-05 PROCEDURE — 93970 EXTREMITY STUDY: CPT

## 2023-08-05 RX ORDER — FLECAINIDE ACETATE 50 MG/1
100 TABLET ORAL EVERY 12 HOURS SCHEDULED
Status: DISCONTINUED | OUTPATIENT
Start: 2023-08-05 | End: 2023-08-09 | Stop reason: HOSPADM

## 2023-08-05 RX ORDER — METOPROLOL SUCCINATE 100 MG/1
100 TABLET, EXTENDED RELEASE ORAL 2 TIMES DAILY
Status: DISCONTINUED | OUTPATIENT
Start: 2023-08-05 | End: 2023-08-09 | Stop reason: HOSPADM

## 2023-08-05 RX ORDER — DIGOXIN 0.25 MG/ML
250 INJECTION INTRAMUSCULAR; INTRAVENOUS EVERY 6 HOURS
Status: COMPLETED | OUTPATIENT
Start: 2023-08-05 | End: 2023-08-06

## 2023-08-05 RX ADMIN — INSULIN LISPRO 1 UNITS: 100 INJECTION, SOLUTION INTRAVENOUS; SUBCUTANEOUS at 17:25

## 2023-08-05 RX ADMIN — SENNOSIDES 8.6 MG: 8.6 TABLET, FILM COATED ORAL at 09:16

## 2023-08-05 RX ADMIN — AMLODIPINE BESYLATE 5 MG: 5 TABLET ORAL at 17:24

## 2023-08-05 RX ADMIN — METOPROLOL SUCCINATE 100 MG: 100 TABLET, EXTENDED RELEASE ORAL at 09:16

## 2023-08-05 RX ADMIN — ZOLPIDEM TARTRATE 10 MG: 5 TABLET ORAL at 21:58

## 2023-08-05 RX ADMIN — LOSARTAN POTASSIUM 50 MG: 50 TABLET, FILM COATED ORAL at 21:53

## 2023-08-05 RX ADMIN — LOSARTAN POTASSIUM 50 MG: 50 TABLET, FILM COATED ORAL at 09:17

## 2023-08-05 RX ADMIN — METOPROLOL SUCCINATE 100 MG: 100 TABLET, EXTENDED RELEASE ORAL at 17:25

## 2023-08-05 RX ADMIN — FLECAINIDE ACETATE 100 MG: 50 TABLET ORAL at 10:29

## 2023-08-05 RX ADMIN — Medication 2.5 MG: at 09:23

## 2023-08-05 RX ADMIN — ROPINIROLE 0.5 MG: 0.25 TABLET, FILM COATED ORAL at 21:53

## 2023-08-05 RX ADMIN — FLECAINIDE ACETATE 100 MG: 50 TABLET ORAL at 21:55

## 2023-08-05 RX ADMIN — DILTIAZEM HYDROCHLORIDE 30 MG: 30 TABLET, FILM COATED ORAL at 12:30

## 2023-08-05 RX ADMIN — DILTIAZEM HYDROCHLORIDE 30 MG: 30 TABLET, FILM COATED ORAL at 17:25

## 2023-08-05 RX ADMIN — EZETIMIBE 10 MG: 10 TABLET ORAL at 09:17

## 2023-08-05 RX ADMIN — INSULIN LISPRO 1 UNITS: 100 INJECTION, SOLUTION INTRAVENOUS; SUBCUTANEOUS at 09:18

## 2023-08-05 RX ADMIN — SENNOSIDES 8.6 MG: 8.6 TABLET, FILM COATED ORAL at 17:25

## 2023-08-05 RX ADMIN — DIGOXIN 250 MCG: 0.25 INJECTION INTRAMUSCULAR; INTRAVENOUS at 16:00

## 2023-08-05 RX ADMIN — APIXABAN 5 MG: 5 TABLET, FILM COATED ORAL at 21:53

## 2023-08-05 RX ADMIN — DIGOXIN 250 MCG: 0.25 INJECTION INTRAMUSCULAR; INTRAVENOUS at 21:54

## 2023-08-05 RX ADMIN — GABAPENTIN 300 MG: 300 CAPSULE ORAL at 21:54

## 2023-08-05 RX ADMIN — APIXABAN 5 MG: 5 TABLET, FILM COATED ORAL at 09:17

## 2023-08-05 RX ADMIN — DULOXETINE HYDROCHLORIDE 30 MG: 30 CAPSULE, DELAYED RELEASE ORAL at 09:16

## 2023-08-05 RX ADMIN — ACETAMINOPHEN 650 MG: 325 TABLET, FILM COATED ORAL at 09:23

## 2023-08-05 RX ADMIN — DIGOXIN 250 MCG: 0.25 INJECTION INTRAMUSCULAR; INTRAVENOUS at 09:16

## 2023-08-05 NOTE — PROGRESS NOTES
[address] [facility]  Progress Note  Name: Jayesh Kamara  MRN: [id number]  Unit/Bed#: S -01 I Date of Admission: 8/4/2023    Date of Service: 8/5/2023 I Hospital Day: 1    Assessment/Plan   * A-fib with RVR ([address])  Assessment & Plan  8/5/2023 patient continues to go in and out of afib with RVR, denies palpitations or anxiety  · Subsequently started on Cardizem drip, cardiology consulted and added flecainide. · [id number]  · Likely in the setting postoperative trigger with somewhat superimposed uncontrolled pain    · Plan  · As per cardio, started metoprolol 100mg, cardizem 30mg oral and now on flecainide, will reassess. Abnormal CT of the chest  Assessment & Plan  · 08/04/2023 CTA PE "Mildly heterogeneous liver with slight lobulation along the left lobe. Early cirrhosis is not excluded and follow-up with ultrasound is recommended. There is a 5 mm left lower lobe nodule on series 304 image 192, stable from 2021. There is no tracheal or endobronchial lesion. Incidental discovery of one or more thyroid nodule(s) measuring more than 1.5 cm and without suspicious features "  · Pending RUQ results  · Recommend follow-up with primary care provider for nonurgent thyroid ultrasound    Anemia  Assessment & Plan  · Hgb trending upward to 11.1, baseline seems to be between 13-14, likely due to recent orthopedic procedure  · No overt bleeding, no melena no hematochezia  · Continue trending CBC a.m.     Type 2 diabetes mellitus without complication, without long-term current use of insulin [facility])  Assessment & Plan  Lab Results   Component Value Date    HGBA1C 6.6 (H) 07/03/2023       Recent Labs     08/04/23  1945 08/04/23  2121 08/05/23  0819   POCGLU 183* 246* 150*       Blood Sugar Average: Last 72 hrs:  Not on medication  · Continue carbohydrate controlled diet  · Continue sliding scale  · Glucose check per protocol    Hyponatremia  Assessment & Plan  · POA sodium noted 132, corrected for glucose 133 mild  · Likely in the setting of recent surgical procedure  · BMP a.m. Chronic diastolic CHF (congestive heart failure) (HCC)  Assessment & Plan  Wt Readings from Last 3 Encounters:   23 71.8 kg (158 lb 6.4 oz)   23 73.9 kg (163 lb)   23 73.1 kg (161 lb 2 oz)   · Last echo 2023 preop evaluation. LVEF 55%, G2DD  · Does not seem to be in exacerbation  · Home regimen Lasix 20 mg as needed was recommended by Dr. Quita Lu cardiology on the outpatient setting if needed can increase to 40 mg    · Plan    · Hold Lasix at this time  · Daily weights, strict I's and O  · Sodium 2 g, restriction fluid 2 L          Essential hypertension  Assessment & Plan  Blood Pressure: 141/89  · Continue home meds amlodipine 5 mg once a day after dinner, losartan 50 milligrams twice daily  · Holding home metoprolol succinate in the setting of A-fib with RVR currently on Cardizem drip likely transition tomorrow. VTE Pharmacologic Prophylaxis: VTE Score: 10 High Risk (Score >/= 5) - Pharmacological DVT Prophylaxis Ordered: apixaban (Eliquis). Sequential Compression Devices Ordered. Patient Centered Rounds: I performed bedside rounds with nursing staff today. Discussions with Specialists or Other Care Team Provider: Dr. Bernard Jaime    Education and Discussions with Family / Patient: Updated  (daughter) at bedside. Current Length of Stay: 1 day(s)  Current Patient Status: Inpatient   Discharge Plan: Anticipate discharge in 24-48 hrs to home. Code Status: Level 3 - DNAR and DNI    Subjective:   Pt reports she is feeling better, shes able to use the bathroom with no issues. Pt is in no acute distress at the moment.      Objective:     Vitals:   Temp (24hrs), Av.4 °F (36.9 °C), Min:98.2 °F (36.8 °C), Max:98.5 °F (36.9 °C)    Temp:  [98.2 °F (36.8 °C)-98.5 °F (36.9 °C)] 98.2 °F (36.8 °C)  HR:  [] 116  Resp:  [18-20] 20  BP: (112-173)/(69-89) 141/89  SpO2:  [93 %-99 %] 96 %  Body mass index is 29.93 kg/m². Input and Output Summary (last 24 hours): Intake/Output Summary (Last 24 hours) at 8/5/2023 1416  Last data filed at 8/5/2023 0820  Gross per 24 hour   Intake 480 ml   Output 350 ml   Net 130 ml       Physical Exam:   Physical Exam  Vitals and nursing note reviewed. Constitutional:       General: She is not in acute distress. Appearance: She is well-developed. HENT:      Head: Normocephalic and atraumatic. Eyes:      Conjunctiva/sclera: Conjunctivae normal.   Cardiovascular:      Rate and Rhythm: Normal rate. Rhythm irregular. Heart sounds: No murmur heard. No gallop. Comments: Irregular pulses palpated bilaterally  Pulmonary:      Effort: Pulmonary effort is normal. No respiratory distress. Breath sounds: Normal breath sounds. Abdominal:      Palpations: Abdomen is soft. Tenderness: There is no abdominal tenderness. Musculoskeletal:         General: No swelling. Cervical back: Neck supple. Skin:     General: Skin is warm and dry. Capillary Refill: Capillary refill takes less than 2 seconds. Neurological:      Mental Status: She is alert.    Psychiatric:         Mood and Affect: Mood normal.         Additional Data:     Labs:  Results from last 7 days   Lab Units 08/05/23  0600 08/04/23  1340   WBC Thousand/uL 6.16 7.61   HEMOGLOBIN g/dL 11.1* 10.0*   HEMATOCRIT % 33.4* 30.9*   PLATELETS Thousands/uL 279 278   NEUTROS PCT %  --  62   LYMPHS PCT %  --  23   MONOS PCT %  --  11   EOS PCT %  --  3     Results from last 7 days   Lab Units 08/05/23  0600 08/04/23  1340   SODIUM mmol/L 136 132*   POTASSIUM mmol/L 4.0 4.2   CHLORIDE mmol/L 101 98   CO2 mmol/L 27 26   BUN mg/dL 16 20   CREATININE mg/dL 0.71 0.79   ANION GAP mmol/L 8 8   CALCIUM mg/dL 8.9 9.0   ALBUMIN g/dL  --  3.7   TOTAL BILIRUBIN mg/dL  --  1.07*   ALK PHOS U/L  --  63   ALT U/L  --  13   AST U/L  --  17   GLUCOSE RANDOM mg/dL 117 159*     Results from last 7 days   Lab Units 08/04/23  1340   INR  1.37*     Results from last 7 days   Lab Units 08/05/23  1207 08/05/23  0819 08/04/23  2121 08/04/23  1945   POC GLUCOSE mg/dl 134 150* 246* 183*               Lines/Drains:  Invasive Devices     Peripheral Intravenous Line  Duration           Peripheral IV 08/04/23 Left Antecubital 1 day                  Telemetry:  Telemetry Orders (From admission, onward)             24 Hour Telemetry Monitoring  Continuous x 24 Hours (Telem)        Question:  Reason for 24 Hour Telemetry  Answer:  Decompensated CHF- and any one of the following: continuous diuretic infusion or total diuretic dose >200 mg daily, associated electrolyte derangement (I.e. K < 3.0), ionotropic drip (continuous infusion), hx of ventricular arrhythmia, or new EF < 35%                 Telemetry Reviewed: Atrial fibrillation. HR averaging 115-125  Indication for Continued Telemetry Use: Arrthymias requiring medical therapy             Imaging:  CTA chest  No acute pulmonary embolism. Mildly heterogeneous liver with slight lobulation along the left lobe. Early cirrhosis is not excluded and follow-up with ultrasound is recommended. Incidental thyroid nodule(s) for which nonemergent thyroid.      CXR: 8/4/2023  No acute cardiopulmonary disease    Recent Cultures (last 7 days):         Last 24 Hours Medication List:   Current Facility-Administered Medications   Medication Dose Route Frequency Provider Last Rate   • acetaminophen  650 mg Oral Q6H PRN Boston Reilly MD     • amLODIPine  5 mg Oral After [address], MD     • apixaban  5 mg Oral BID Boston Reilly MD     • digoxin  250 mcg Intravenous Q6H Valdez Carter PA-C     • diltiazem  30 mg Oral Q6H [address], PA-C     • DULoxetine  30 mg Oral Daily Boston Reilly MD     • ezetimibe  10 mg Oral Daily Boston Reilly MD     • [START ON 8/7/2023] famotidine  20 mg Oral Every Other Day Koffi Figueroa Jason Oseguera MD     • flecainide  100 mg Oral Q12H Haider Burns MD     • gabapentin  300 mg Oral HS Dmitry Gilbert MD     • insulin lispro  1-5 Units Subcutaneous TID [address] MD     • losartan  50 mg Oral BID Dmitry Gilbert MD     • metoprolol succinate  100 mg Oral BID Krystle Galeas PA-C     • oxyCODONE  5 mg Oral Q4H PRN Dmitry Gilbert MD     • oxyCODONE  2.5 mg Oral Q6H PRN Dmitry Gilbert MD     • polyethylene glycol  17 g Oral Daily PRN Dmitry Gilbert MD     • rOPINIRole  0.5 mg Oral HS Dmitry Gilbert MD     • senna  1 tablet Oral BID Dmitry Gilbert MD     • zolpidem  10 mg Oral HS PRN Dmitry Gilbert MD          Today, Patient Was Seen By: Ida Craig MD    **Please Note: This note may have been constructed using a voice recognition system. **

## 2023-08-05 NOTE — CONSULTS
Consultation - Cardiology Team One  Angelia Wasserman 77 y.o. female MRN: [id number]  Unit/Bed#: S -01 Encounter: [id number]    Inpatient consult to Cardiology  Consult performed by: Suleman Putnam PA-C  Consult ordered by: Ana Diamond MD          Physician Requesting Consult: Alessia Machuca MD  Reason for Consult / Principal Problem: PAF with RVR    Assessment:    1. PAF with RVR: Reported palpitations and dyspnea on exertion since recent total knee replacement. Device interrogation revealed 100% AF burden with times in RVR. EKG in the ED revealed AF with rates in the 120s. Patient received 15 mg IV Cardizem bolus and was started on a Cardizem infusion currently running at 15 mL/hr. Typically also maintained on Toprol-XL 75 mg BID which has been held. · [id number]: Maintained on Eliquis 5 mg BID  · She has had history of breakthrough AF on amiodarone in the past    2. Chronic HFpEF: Volume status appears stable. · Echocardiogram 6/8/2023: EF 55% with no WMA, G2DD, normal RV function, mild LA dilatation, moderate MR, mild-moderate TR with PASP of 64 mmHg. · Diuretic regimen: Lasix 20 mg as needed    3. SSS: s/p MDT DC PPM implanted 3/2021. · Device interrogation 8/4/2023: MDT DC PPM (MRI conditional). AP 0%,  0%, 100% AF burden at times with RVR. 4.  Essential hypertension: Average /76 on amlodipine 5 mg daily, losartan 50 mg BID. Toprol-XL 75 mg BID is on hold. 5.  Hyperlipidemia: Lipid panel 7/2023 , TG 68, HDL 53, LDL 96 on Zetia 10 mg daily. 6.  Type II DM: Hemoglobin A1c 6.6.   Management per primary team.    7.  Left total knee replacement: On 7/31      Plan/Recommendations:  · Restart Toprol-XL at higher dose of 100 mg BID  · Wean Cardizem infusion and start oral Cardizem 30 mg every 6 hours  · We load with IV digoxin to optimize rates  · Continue Eliquis for anticoagulation  · Please await attending attestation for final recommendations  ___________________________________________________________________    CC: Palpitations      History of Present Illness   HPI: Levern Holstein is a 77y.o. year old female who has PAF s/p ablation in 3/2021 and s/p fib/flutter ablation 8/2022, SSS  s/p MDT DC PPM implanted 3/2021, chronic HFpEF, essential hypertension, hyperlipidemia, type II DM who follows with cardiologist Dr. Shaun Reza. She was recently seen in the office on 6/26/2023 for preoperative clearance for knee replacement surgery. Patient was moderate risk not requiring any additional testing. She presents to the emergency room at [address] [place] [address] on 8/4/2023 with complaints of palpitations. Patient was just discharged on Tuesday after knee replacement on 7/31 and reports that she has been having palpitations and dyspnea on exertion. She had called cardiology office and her device interrogation showed she was in atrial fibrillation therefore advised to go to the ED. On arrival to the ED patient was tachycardic in the 130s with stable BP and oxygen saturation 100% on room air. CXR revealed no acute cardiopulmonary disease. Labs revealed sodium 132 otherwise stable CMP, negative troponin x3, , hemoglobin 10 (12-14 baseline), stable TSH, elevated D-dimer of 1.44. CTA was negative for PE. Patient was given 15 mg Cardizem bolus and was started on a Cardizem infusion currently running at 15 mL/hr. cardiology has been consulted for further evaluation and management of AF with RVR. Home medication regimen includes amlodipine 5 mg daily, Eliquis 5 mg BID, Zetia 10 mg daily, Lasix 20 mg PRN, losartan 50 mg daily, Toprol-XL 75 mg BID. Patient resting in bed during consultation and denies any chest pain, shortness of breath or palpitations. Patient states that since she was home she was feeling tired and palpitations.   She still feels some pain after her left knee replacement but states that overall it is well controlled. She denies any orthopnea, PND or lower extremity edema. Device interrogation 8/4/2023: MDT DC PPM (MRI conditional). AP 0%,  0%, 100% AF burden at times with RVR. Echocardiogram 6/8/2023: EF 55% with no WMA, G2DD, normal RV function, mild LA dilatation, moderate MR, mild-moderate TR with PASP of 64 mmHg. When compared to echocardiogram from 1/2021 MR has worsened. Nuclear stress test 10/5/2022: Attenuation artifact defect with no ischemia noted. EKG reviewed personally: Atrial fibrillation with RVR at a rate of 122 bpm with nonspecific inferior T wave abnormality. When compared to EKG from 6/13/2022 A-fib with rates in the 80s with LAFB was present. Telemetry reviewed personally: Atrial fibrillation with ventricular rates 100-1 20s. Currently rates are in the 120s. Review of Systems   Constitutional: Positive for malaise/fatigue. Negative for chills. Cardiovascular: Negative for chest pain, dyspnea on exertion, leg swelling, near-syncope, orthopnea, palpitations, paroxysmal nocturnal dyspnea and syncope. Respiratory: Negative. Negative for cough, shortness of breath and wheezing. Endocrine: Negative. Hematologic/Lymphatic: Negative. Skin: Negative. Musculoskeletal: Negative. Gastrointestinal: Negative. Negative for diarrhea, nausea and vomiting. Neurological: Negative for dizziness, light-headedness and weakness. Psychiatric/Behavioral: Negative. Negative for altered mental status. All other systems reviewed and are negative.     Historical Information   Past Medical History:   Diagnosis Date   • Actinic keratosis     last assessed - [id number]   • Arthritis    • Atrial fibrillation with rapid ventricular response ([address])     last assessed - 26Apr2016   • Basal cell carcinoma    • Benign colon polyp     last assessed - 27Apr2015   • CHF (congestive heart failure) ([address]) 06/2022   • Disease of thyroid gland    • Effusion of knee joint right     Resolved - [id number]   • Esophageal reflux    • Fibromyalgia     last assessed - [id number]   • Fibromyalgia, primary    • GERD (gastroesophageal reflux disease)    • Hypertension    • Palpitations     last assessed - 30Apr2013   • Peroneal tendonitis, right     last assessed - 02Oct2013   • Right lumbar radiculopathy 03/17/2016   • Thyroid cancer ([address])    • Thyroid nodule    • Trochanteric bursitis of left hip 03/09/2018     Past Surgical History:   Procedure Laterality Date   • CARDIAC ELECTROPHYSIOLOGY STUDY AND ABLATION  08/2022   • CATARACT EXTRACTION Bilateral    • COLONOSCOPY  03/2018   • COLONOSCOPY W/ POLYPECTOMY  2021    repeat in 5 years   • EYE SURGERY     • HYSTERECTOMY     • JOINT REPLACEMENT Left     knee   • NJ NEUROPLASTY &/TRANSPOS MEDIAN NRV CARPAL TUNNE Right 11/14/2019    Procedure: RELEASE CARPAL TUNNEL;  Surgeon: Havery Boeck, MD;  Location: [facility];  Service: Orthopedics   • NJ TOTAL THYROID LOBECTOMY UNI W/WO ISTHMUSECTOMY Left 12/16/2020    Procedure: Left THYROID LOBECTOMY;  Surgeon: Nani Das MD;  Location: [facility];  Service: ENT   • RECTAL POLYPECTOMY     • REPLACEMENT TOTAL KNEE Left     last assessed - 27Apr2015   • TONSILLECTOMY     • TOTAL ABDOMINAL HYSTERECTOMY     • TUBAL LIGATION     • US GUIDED THYROID BIOPSY  10/14/2020     Social History     Substance and Sexual Activity   Alcohol Use Not Currently     Social History     Substance and Sexual Activity   Drug Use Never     Social History     Tobacco Use   Smoking Status Never   Smokeless Tobacco Never     Family History:   Family History   Problem Relation Age of Onset   • Heart disease Mother    • Diabetes Mother    • Heart disease Father    • Coronary artery disease Father    • Stroke Father         cerebrovascular accident   • Heart attack Father         myocardial infarction   • Sudden death Father         scd   • Other Family         Back disorder   • Coronary artery disease Family    • Neuropathy Family    • Osteoporosis Family    • No Known Problems Daughter    • No Known Problems Maternal Grandmother    • No Known Problems Maternal Grandfather    • No Known Problems Paternal Grandmother    • No Known Problems Paternal Grandfather    • Cancer Maternal Uncle    • Breast cancer Maternal Aunt 72   • No Known Problems Son    • No Known Problems Maternal Aunt    • No Known Problems Maternal Aunt    • No Known Problems Maternal Aunt    • No Known Problems Paternal Aunt    • No Known Problems Paternal Aunt    • Anuerysm Neg Hx    • Clotting disorder Neg Hx    • Arrhythmia Neg Hx    • Heart failure Neg Hx        Meds/Allergies   all current active meds have been reviewed, current meds:   Current Facility-Administered Medications   Medication Dose Route Frequency   • acetaminophen (TYLENOL) tablet 650 mg  650 mg Oral Q6H PRN   • amLODIPine (NORVASC) tablet 5 mg  5 mg Oral After Dinner   • apixaban (ELIQUIS) tablet 5 mg  5 mg Oral BID   • diltiazem (CARDIZEM) 125 mg in sodium chloride 0.9 % 125 mL infusion  1-15 mg/hr Intravenous Titrated   • DULoxetine (CYMBALTA) delayed release capsule 30 mg  30 mg Oral Daily   • ezetimibe (ZETIA) tablet 10 mg  10 mg Oral Daily   • [START ON 8/7/2023] famotidine (PEPCID) tablet 20 mg  20 mg Oral Every Other Day   • gabapentin (NEURONTIN) capsule 300 mg  300 mg Oral HS   • insulin lispro (HumaLOG) 100 units/mL subcutaneous injection 1-5 Units  1-5 Units Subcutaneous TID AC   • losartan (COZAAR) tablet 50 mg  50 mg Oral BID   • oxyCODONE (ROXICODONE) IR tablet 5 mg  5 mg Oral Q4H PRN   • oxyCODONE (ROXICODONE) split tablet 2.5 mg  2.5 mg Oral Q6H PRN   • polyethylene glycol (MIRALAX) packet 17 g  17 g Oral Daily PRN   • rOPINIRole (REQUIP) tablet 0.5 mg  0.5 mg Oral HS   • senna (SENOKOT) tablet 8.6 mg  1 tablet Oral BID   • zolpidem (AMBIEN) tablet 10 mg  10 mg Oral HS PRN    and PTA meds:   Prior to Admission Medications   Prescriptions Last Dose Informant Patient Reported? Taking?    Arthritis Pain Relief 650 MG CR tablet 2023  Yes Yes   Sig: Take 650 mg by mouth 3 (three) times a day   Cholecalciferol 50 MCG (2000 UT) CAPS Past Week Self Yes Yes   Sig: Take 2,000 Units by mouth 2 (two) times a day   Chromium Picolinate (CHROMIUM PICOLATE PO) Past Week Self Yes Yes   Sig: Take 1 tablet by mouth daily   DULoxetine (CYMBALTA) 30 mg delayed release capsule 2023 Self No Yes   Sig: take 1 capsule by mouth once daily   Nel-diamond 8.6 MG tablet Past Week  Yes Yes   Sig: Take 8.6 mg by mouth 2 (two) times a day as needed   Polyethylene Glycol 3350 POWD   Yes Yes   Sig: Take 17 g by mouth 2 (two) times a day as needed   TURMERIC PO  Self Yes No   Sig: Take 1 capsule by mouth 2 (two) times a day   amLODIPine (NORVASC) 5 mg tablet 8/3/2023 Self No Yes   Sig: Take 1 tablet (5 mg total) by mouth daily   apixaban (ELIQUIS) 5 mg 2023 Self No Yes   Sig: Take 1 tablet (5 mg total) by mouth 2 (two) times a day   ascorbic acid (VITAMIN C) 500 mg tablet Past Week Self Yes Yes   Sig: Take 500 mg by mouth daily    ezetimibe (ZETIA) 10 mg tablet 8/3/2023  No Yes   Sig: take 1 tablet by mouth once daily   famotidine (PEPCID) 20 mg tablet  Self Yes No   Sig: Take 20 mg by mouth every other day     furosemide (LASIX) 20 mg tablet Past Month Self No Yes   Sig: take 1 tablet by mouth daily if needed for shortness of breath WEIGHT GAIN 3 LBS IN 1 DAY OR LOWER LEG SWELLING   gabapentin (NEURONTIN) 300 mg capsule 8/3/2023 Self No Yes   Sig: Take 1 capsule (300 mg total) by mouth daily at bedtime   ipratropium (ATROVENT) 0.03 % nasal spray Past Week Self No Yes   Si sprays into each nostril 3 (three) times a day Begin once per day, then progress to twice per day. Progress to three times a day as tolerated.    lidocaine (XYLOCAINE) 5 % ointment  Self No No   Sig: Apply topically as needed for mild pain   losartan (COZAAR) 50 mg tablet 2023 Self No Yes   Sig: Take 1 tablet (50 mg total) by mouth 2 (two) times a day   metoprolol succinate (TOPROL-XL) 25 mg 24 hr tablet 8/4/2023  No Yes   Sig: Take 3 tablets (75 mg total) by mouth every 12 (twelve) hours   oxyCODONE (ROXICODONE) 5 immediate release tablet 8/4/2023  Yes Yes   Sig: Take 5 mg by mouth every 4 (four) hours as needed   pantoprazole (PROTONIX) 20 mg tablet Not Taking Self Yes No   Sig: Take 1 tablet by mouth daily before breakfast   Patient not taking: Reported on 6/26/2023   rOPINIRole (REQUIP) 0.5 mg tablet 8/3/2023 Self No Yes   Sig: Take 1 tablet (0.5 mg total) by mouth daily at bedtime   senna (SENOKOT) 8.6 MG tablet Past Week  Yes Yes   Sig: Take 17.2 mg by mouth 2 (two) times a day as needed   traMADol (ULTRAM) 50 mg tablet Past Month Self No Yes   Sig: Take 1 tablet (50 mg total) by mouth 2 (two) times a day as needed for moderate pain   zolpidem (AMBIEN) 10 mg tablet 8/3/2023 Self No Yes   Sig: Take 1 tablet (10 mg total) by mouth daily at bedtime as needed for sleep      Facility-Administered Medications: None     diltiazem, 1-15 mg/hr, Last Rate: 15 mg/hr (08/05/23 0616)        Allergies   Allergen Reactions   • Sotalol Other (See Comments)     Prolonged QTc leading to torsades de pointes    • Penicillins Other (See Comments)     As a child calcium deposit in the arm    • Ace Inhibitors GI Intolerance     Did feel well on it   • Omeprazole Abdominal Pain, Rash and Vomiting     stomach pain, vomiting, rash       Objective   Vitals: Blood pressure 141/89, pulse (!) 116, temperature 98.2 °F (36.8 °C), resp. rate 20, weight 71.8 kg (158 lb 6.4 oz), last menstrual period 02/01/1990, SpO2 96 %, not currently breastfeeding.,     Body mass index is 29.93 kg/m². ,     Systolic (19INW), RLT:069 , Min:112 , NE:022     Diastolic (37HVY), CHANDRA:01, Min:69, Max:89    Wt Readings from Last 3 Encounters:   08/05/23 71.8 kg (158 lb 6.4 oz)   07/03/23 73.9 kg (163 lb)   06/26/23 73.1 kg (161 lb 2 oz)      Lab Results   Component Value Date    CREATININE 0.71 08/05/2023    CREATININE 0.79 08/04/2023    CREATININE 0.88 07/03/2023             Intake/Output Summary (Last 24 hours) at 8/5/2023 0826  Last data filed at 8/4/2023 1955  Gross per 24 hour   Intake 480 ml   Output --   Net 480 ml     Weight (last 2 days)     Date/Time Weight    08/05/23 0600 71.8 (158.4)        Invasive Devices     Peripheral Intravenous Line  Duration           Peripheral IV 08/04/23 Left Antecubital <1 day                  Physical Exam  Vitals and nursing note reviewed. Constitutional:       General: She is not in acute distress. Appearance: She is well-developed. Comments: On RA in NAD   HENT:      Head: Normocephalic and atraumatic. Neck:      Vascular: No JVD. Cardiovascular:      Rate and Rhythm: Tachycardia present. Rhythm irregular. Heart sounds: Normal heart sounds. No murmur heard. No friction rub. Pulmonary:      Effort: Pulmonary effort is normal. No respiratory distress. Breath sounds: Normal breath sounds. No wheezing or rales. Abdominal:      General: Bowel sounds are normal. There is no distension. Palpations: Abdomen is soft. Tenderness: There is no abdominal tenderness. Musculoskeletal:         General: No tenderness. Normal range of motion. Cervical back: Normal range of motion and neck supple. Right lower leg: No edema. Left lower leg: No edema. Skin:     General: Skin is warm and dry. Findings: No erythema. Neurological:      Mental Status: She is alert and oriented to person, place, and time. Psychiatric:         Mood and Affect: Mood normal.         Behavior: Behavior normal.         Thought Content:  Thought content normal.         Judgment: Judgment normal.           LABORATORY RESULTS:      CBC with diff:   Results from last 7 days   Lab Units 08/05/23  0600 08/04/23  1340   WBC Thousand/uL 6.16 7.61   HEMOGLOBIN g/dL 11.1* 10.0*   HEMATOCRIT % 33.4* 30.9*   MCV fL 94 95   PLATELETS Thousands/uL 279 278   RBC Million/uL 3.55* 3.27* MCH pg 31.3 30.6   MCHC g/dL 33.2 32.4   RDW % 13.2 13.2   MPV fL 9.9 10.1   NRBC AUTO /100 WBCs  --  0       CMP:  Results from last 7 days   Lab Units 23  0600 23  1340   POTASSIUM mmol/L 4.0 4.2   CHLORIDE mmol/L 101 98   CO2 mmol/L 27 26   BUN mg/dL 16 20   CREATININE mg/dL 0.71 0.79   CALCIUM mg/dL 8.9 9.0   AST U/L  --  17   ALT U/L  --  13   ALK PHOS U/L  --  63   EGFR ml/min/1.73sq m 81 71       BMP:  Results from last 7 days   Lab Units 23  0600 23  1340   POTASSIUM mmol/L 4.0 4.2   CHLORIDE mmol/L 101 98   CO2 mmol/L 27 26   BUN mg/dL 16 20   CREATININE mg/dL 0.71 0.79   CALCIUM mg/dL 8.9 9.0          Lab Results   Component Value Date    NTBNP 761 (H) 2021    NTBNP 2,773 (H) 2021    NTBNP 506 (H) 2019            Results from last 7 days   Lab Units 23  0600   MAGNESIUM mg/dL 2.0                 Results from last 7 days   Lab Units 23  1340   TSH 3RD GENERATON uIU/mL 2.439       Results from last 7 days   Lab Units 23  1340   INR  1.37*     Lipid Profile:   Lab Results   Component Value Date    CHOL 205 2015    CHOL 195 07/10/2014     Lab Results   Component Value Date    HDL 53 2023    HDL 66 2021    HDL 54 2020     Lab Results   Component Value Date    LDLCALC 96 2023    LDLCALC 88 2021    LDLCALC 104 (H) 2020     Lab Results   Component Value Date    TRIG 68 2023    TRIG 112 2021    TRIG 121 2020         Cardiac testing:   Results for orders placed during the hospital encounter of 21    Echo complete with contrast if indicated    Narrative  [address] [place] [address]  [phone]    Transthoracic Echocardiogram  2D, M-mode, Doppler, and Color Doppler    Study date:  2021    Patient: Umang Matos  MR number: [id number]  Account number: [de-identified]  : 1945  Age: 67 years  Gender: Female  Status: Inpatient  Location: Bedside  Height: 62 in  Weight: 151.6 lb  BP: 126/ 94 mmHg    Indications: Atrial fibrillation    Diagnoses: I48.0 - Atrial fibrillation    Sonographer:  Rockne Najjar, RCS  Primary Physician:  Giovany Davis MD  Referring Physician:  Renetta Walton MD  Group:  [facility]  Interpreting Physician:  Hermelindo Dawkins MD    SUMMARY    LEFT VENTRICLE:  Systolic function was normal. Ejection fraction was estimated to be 55 %. There were no regional wall motion abnormalities. Wall thickness was at the upper limits of normal.  Doppler parameters were consistent with elevated ventricular end-diastolic filling pressure. LEFT ATRIUM:  The atrium was mildly to moderately dilated. RIGHT ATRIUM:  The atrium was mildly dilated. MITRAL VALVE:  There was mild stenosis. TRICUSPID VALVE:  There was mild to moderate regurgitation. Pulmonary artery systolic pressure was mildly increased. HISTORY: PRIOR HISTORY: HLD, HTN, PAF, chronic CHF, thyroid nodule    PROCEDURE: The procedure was performed at the bedside. This was a routine study. The transthoracic approach was used. The study included complete 2D imaging, M-mode, complete spectral Doppler, and color Doppler. The heart rate was 101 bpm,  at the start of the study. Images were obtained from the parasternal, apical, subcostal, and suprasternal notch acoustic windows. Echocardiographic views were limited due to lung interference. This was a technically difficult study. LEFT VENTRICLE: Size was normal. Systolic function was normal. Ejection fraction was estimated to be 55 %. There were no regional wall motion abnormalities. Wall thickness was at the upper limits of normal. DOPPLER: Transmitral flow  pattern: atrial fibrillation. Left ventricular diastolic function parameters were abnormal. Doppler parameters were consistent with elevated ventricular end-diastolic filling pressure.     RIGHT VENTRICLE: The size was normal. Systolic function was normal. Wall thickness was normal.    LEFT ATRIUM: The atrium was mildly to moderately dilated. RIGHT ATRIUM: The atrium was mildly dilated. MITRAL VALVE: Valve structure was normal. There was normal leaflet separation. DOPPLER: The transmitral velocity was within the normal range. There was mild stenosis. There was no significant regurgitation. AORTIC VALVE: The valve was trileaflet. Leaflets exhibited normal thickness and normal cuspal separation. DOPPLER: Transaortic velocity was within the normal range. There was no evidence for stenosis. There was no significant  regurgitation. TRICUSPID VALVE: The valve structure was normal. There was normal leaflet separation. DOPPLER: The transtricuspid velocity was within the normal range. There was no evidence for stenosis. There was mild to moderate regurgitation. Pulmonary  artery systolic pressure was mildly increased. PULMONIC VALVE: Leaflets exhibited normal thickness, no calcification, and normal cuspal separation. DOPPLER: The transpulmonic velocity was within the normal range. There was no significant regurgitation. PERICARDIUM: There was no pericardial effusion. The pericardium was normal in appearance. AORTA: The root exhibited normal size. SYSTEMIC VEINS: IVC: The inferior vena cava was normal in size. PULMONARY VEINS: DOPPLER: Doppler signals were not recordable in the pulmonary vein(s).     SYSTEM MEASUREMENT TABLES    2D  %FS: 37.15 %  Ao Diam: 2.74 cm  Ao asc: 2.72 cm  EDV(Teich): 103.17 ml  EF(Teich): 67.06 %  ESV(Teich): 33.98 ml  IVSd: 0.94 cm  LA Area: 13.2 cm2  LA Diam: 3.98 cm  LVEDV MOD A4C: 32.01 ml  LVEF MOD A4C: 63.74 %  LVESV MOD A4C: 11.61 ml  LVIDd: 4.72 cm  LVIDs: 2.96 cm  LVLd A4C: 5.9 cm  LVLs A4C: 4.79 cm  LVPWd: 0.93 cm  RA Area: 8.67 cm2  RVIDd: 3.35 cm  SV MOD A4C: 20.4 ml  SV(Teich): 69.19 ml    CW  TR MaxP.39 mmHg  TR Vmax: 2.89 m/s    MM  TAPSE: 1.51 cm    [facility] Accredited Echocardiography Laboratory    Prepared and electronically signed by    Mario Sales MD  Signed 04-Jan-2021 11:05:34    No results found for this or any previous visit. No valid procedures specified. Results for orders placed during the hospital encounter of 10/05/22    NM myocardial perfusion spect (stress and/or rest)    Interpretation Summary  •  Resting ECG: ECG is abnormal. Resting ECG shows no ST-segment deviation. Patient a paced and V sensed. •  Perfusion Defect Conclusion: The stress/rest perfusion ratio is 1.04 . There is no evidence of transient ischemic dilation (TID). •  Perfusion: There is a left ventricular perfusion defect that is small in size with mild reduction in uptake present in the mid to apical anterior and anteroseptal location(s) that is predominantly fixed. The defect appears to be an artifact caused by breast attenuation. •  Stress Function: Left ventricular function post-stress is normal. Post-stress ejection fraction is 70 %. Negative exercise pharmacological stress test        Imaging: I have personally reviewed pertinent reports. CTA ED chest PE study    Result Date: 8/4/2023  Narrative: CTA - CHEST WITH IV CONTRAST - PULMONARY ANGIOGRAM INDICATION:   sob, knee surgery, elevated ddimer. COMPARISON: 2/17/2021 TECHNIQUE: CTA examination of the chest was performed using angiographic technique according to a protocol specifically tailored to evaluate for pulmonary embolism. Multiplanar 2D reformatted images were created from the source data. In addition, coronal 3D MIP postprocessing was performed on the acquisition scanner. Radiation dose length product (DLP) for this visit:  354 mGy-cm . This examination, like all CT scans performed in the [facility], was performed utilizing techniques to minimize radiation dose exposure, including the use of iterative reconstruction and automated exposure control.  IV Contrast:  80 mL of iohexol (OMNIPAQUE) FINDINGS: PULMONARY ARTERIAL TREE:  No pulmonary embolus is seen. LUNGS: There is a 5 mm left lower lobe nodule on series 304 image 192, stable from 2021. There is no tracheal or endobronchial lesion. PLEURA:  Unremarkable. HEART/GREAT VESSELS:  Unremarkable for patient's age. No thoracic aortic aneurysm. MEDIASTINUM AND BETTY:  Unremarkable. CHEST WALL AND LOWER NECK: Incidental discovery of one or more thyroid nodule(s) measuring more than 1.5 cm and without suspicious features is noted in this patient who is above 28years old; according to guidelines published in the February 2015 white paper on incidental thyroid nodules in the Journal of the [employer] of Radiology Joel Bravo), further characterization with thyroid ultrasound is recommended. VISUALIZED STRUCTURES IN THE UPPER ABDOMEN: The liver is mildly heterogeneous with slight lobulation along the left lobe. OSSEOUS STRUCTURES:  No acute fracture or destructive osseous lesion. There are multiple old healed right-sided rib fractures. Impression: 1. No acute pulmonary embolism. 2.  Mildly heterogeneous liver with slight lobulation along the left lobe. Early cirrhosis is not excluded and follow-up with ultrasound is recommended. 3.  Incidental thyroid nodule(s) for which nonemergent thyroid. The study was marked in [facility] for immediate notification. Is recommended. Workstation performed: [id number]     XR chest 2 views    Result Date: 8/4/2023  Narrative: CHEST INDICATION:   palpitations. COMPARISON: Compared with 6/15/2022 EXAM PERFORMED/VIEWS:  XR CHEST PA & LATERAL FINDINGS: Left chest wall pacemaker. Cardiomediastinal silhouette appears unremarkable. The lungs are clear. No pneumothorax or pleural effusion. Osseous structures appear within normal limits for patient age. Impression: No acute cardiopulmonary disease.  Workstation performed: [id number]     Cardiac EP device report    Result Date: 8/4/2023  Narrative: MDT-DUAL CHAMBER PPM (AAIR-DDDR MODE)/ ACTIVE SYSTEM IS MRI CONDITIONAL NB/ALERT-CARELINK TRANSMISSION: BATTERY STATUS "11 YRS." AP 0%  0%. ALL AVAILABLE LEAD PARAMETERS WITHIN NORMAL LIMITS. 27 VHRS & 3 FAST A/V NOTED. 3 AT/AF NOTED; 100% BURDEN. AVAIL EGRAMS PRESENT AS AFIB W/RVR RATES >140 BPM. PT HAS LEG SX EARLIER IN WEEK; ON METO SUCC & ELIQUIS. EF 55% (ECHO 2023). DR. OSWALD MADE AWARE. L/M FOR PT FOR ASSESSMENT HOWEVER NO C/B. NC     XR KNEE 1 OR 2 VW LEFT    Result Date: 7/31/2023  Narrative: XR KNEE 1 OR 2 VW LEFT HISTORY:  78 years  Female   REASON FOR STUDY:  post op joint prosthesis placement   Revision stems in place both tibia and femur. Please include distal 2/3 of femur EXAM: Left knee: AP, lateral, internal oblique and external oblique: 4 views PRIOR: 3/1/2023 left knee [facility] ortho FINDINGS:  Revision type total left knee arthroplasty. Femoral and tibial components changed. 2 distal femoral cables. Some residual cortical bone fragments along the medial femoral condyle where previous osteolyses was present. Soft tissues compatible with preceding surgery. Where ostial lysis was present on the preoperative study. Impression: IMPRESSION: Revision type total left knee arthroplasty in expected position.    Workstation:[id number]          Counseling / Coordination of Care  Total floor / unit time spent today 45 minutes. Greater than 50% of total time was spent with the patient and / or family counseling and / or coordination of care. A description of the counseling / coordination of care: Review of history, current assessment, development of a plan. Code Status: Level 3 - DNAR and DNI    ** Please Note: Dragon 360 Dictation voice to text software may have been used in the creation of this document.  **

## 2023-08-05 NOTE — DISCHARGE INSTR - AVS FIRST PAGE
Dear Cheral Brittle,     It was our pleasure to care for you here at [facility], [facility]. It is our hope that we were always able to exceed the expected standards for your care during your stay. You were hospitalized due to afib with RvR. You were cared for on the 3rd floor by Kate Martinez MD under the service of Marlyn Moura MD with the [facility] who covers for your primary care physician (PCP), Derrick Pereira MD, while you were hospitalized. If you have any questions or concerns related to this hospitalization, you may contact us at [phone]. For follow up as well as any medication refills, we recommend that you follow up with your primary care physician. A registered nurse will reach out to you by phone within a few days after your discharge to answer any additional questions that you may have after going home. However, at this time we provide for you here, the most important instructions / recommendations at discharge:     Notable Medication Adjustments -   Diltiazem 30mg, Flecainide 10mg, Metoprolol 100mg   Testing Required after Discharge -   EKG  Important follow up information -   Please follow up with your PCP to plan for thyroid ultrasound and to discuss your blood sugar. Follow up with your Cardiologist  Please review this entire after visit summary as additional general instructions including medication list, appointments, activity, diet, any pertinent wound care, and other additional recommendations from your care team that may be provided for you.       Sincerely,     Abbe Parisi MD

## 2023-08-05 NOTE — ASSESSMENT & PLAN NOTE
· Hgb trending upward to 11.1, baseline seems to be between 13-14, likely due to recent orthopedic procedure  · No overt bleeding, no melena no hematochezia  · Continue trending CBC a.m.

## 2023-08-05 NOTE — ASSESSMENT & PLAN NOTE
· 08/04/2023 CTA PE "Mildly heterogeneous liver with slight lobulation along the left lobe. Early cirrhosis is not excluded and follow-up with ultrasound is recommended. There is a 5 mm left lower lobe nodule on series 304 image 192, stable from 2021. There is no tracheal or endobronchial lesion.  Incidental discovery of one or more thyroid nodule(s) measuring more than 1.5 cm and without suspicious features "  · Pending RUQ results  · Recommend follow-up with primary care provider for nonurgent thyroid ultrasound

## 2023-08-05 NOTE — ASSESSMENT & PLAN NOTE
Wt Readings from Last 3 Encounters:   08/05/23 71.8 kg (158 lb 6.4 oz)   07/03/23 73.9 kg (163 lb)   06/26/23 73.1 kg (161 lb 2 oz)   · Last echo June 2023 preop evaluation.   LVEF 55%, G2DD  · Does not seem to be in exacerbation  · Home regimen Lasix 20 mg as needed was recommended by Dr. Lisa Nixon cardiology on the outpatient setting if needed can increase to 40 mg    · Plan    · Hold Lasix at this time  · Daily weights, strict I's and O  · Sodium 2 g, restriction fluid 2 L

## 2023-08-05 NOTE — ASSESSMENT & PLAN NOTE
Lab Results   Component Value Date    HGBA1C 6.6 (H) 07/03/2023       Recent Labs     08/04/23  1945 08/04/23 2121 08/05/23  0819   POCGLU 183* 246* 150*       Blood Sugar Average: Last 72 hrs:  Not on medication  · Continue carbohydrate controlled diet  · Continue sliding scale  · Glucose check per protocol

## 2023-08-05 NOTE — PLAN OF CARE
Problem: Potential for Falls  Goal: Patient will remain free of falls  Description: INTERVENTIONS:  - Educate patient/family on patient safety including physical limitations  - Instruct patient to call for assistance with activity   - Consult OT/PT to assist with strengthening/mobility   - Keep Call bell within reach  - Keep bed low and locked with side rails adjusted as appropriate  - Keep care items and personal belongings within reach  - Initiate and maintain comfort rounds  - Make Fall Risk Sign visible to staff  - Offer Toileting every  Hours, in advance of need  - Initiate/Maintain alarm  - Obtain necessary fall risk management equipment:   - Apply yellow socks and bracelet for high fall risk patients  - Consider moving patient to room near nurses station  Outcome: Progressing     Problem: MOBILITY - ADULT  Goal: Maintain or return to baseline ADL function  Description: INTERVENTIONS:  -  Assess patient's ability to carry out ADLs; assess patient's baseline for ADL function and identify physical deficits which impact ability to perform ADLs (bathing, care of mouth/teeth, toileting, grooming, dressing, etc.)  - Assess/evaluate cause of self-care deficits   - Assess range of motion  - Assess patient's mobility; develop plan if impaired  - Assess patient's need for assistive devices and provide as appropriate  - Encourage maximum independence but intervene and supervise when necessary  - Involve family in performance of ADLs  - Assess for home care needs following discharge   - Consider OT consult to assist with ADL evaluation and planning for discharge  - Provide patient education as appropriate  Outcome: Progressing  Goal: Maintains/Returns to pre admission functional level  Description: INTERVENTIONS:  - Perform BMAT or MOVE assessment daily.   - Set and communicate daily mobility goal to care team and patient/family/caregiver.    - Collaborate with rehabilitation services on mobility goals if consulted  - Perform Range of Motion  times a day. - Reposition patient every  hours.   - Dangle patient  times a day  - Stand patient  times a day  - Ambulate patient  times a day  - Out of bed to chair  times a day   - Out of bed for meals times a day  - Out of bed for toileting  - Record patient progress and toleration of activity level   Outcome: Progressing     Problem: CARDIOVASCULAR - ADULT  Goal: Maintains optimal cardiac output and hemodynamic stability  Description: INTERVENTIONS:  - Monitor I/O, vital signs and rhythm  - Monitor for S/S and trends of decreased cardiac output  - Administer and titrate ordered vasoactive medications to optimize hemodynamic stability  - Assess quality of pulses, skin color and temperature  - Assess for signs of decreased coronary artery perfusion  - Instruct patient to report change in severity of symptoms  Outcome: Progressing  Goal: Absence of cardiac dysrhythmias or at baseline rhythm  Description: INTERVENTIONS:  - Continuous cardiac monitoring, vital signs, obtain 12 lead EKG if ordered  - Administer antiarrhythmic and heart rate control medications as ordered  - Monitor electrolytes and administer replacement therapy as ordered  Outcome: Progressing     Problem: METABOLIC, FLUID AND ELECTROLYTES - ADULT  Goal: Glucose maintained within target range  Description: INTERVENTIONS:  - Monitor Blood Glucose as ordered  - Assess for signs and symptoms of hyperglycemia and hypoglycemia  - Administer ordered medications to maintain glucose within target range  - Assess nutritional intake and initiate nutrition service referral as needed  Outcome: Progressing     Problem: MUSCULOSKELETAL - ADULT  Goal: Maintain or return mobility to safest level of function  Description: INTERVENTIONS:  - Assess patient's ability to carry out ADLs; assess patient's baseline for ADL function and identify physical deficits which impact ability to perform ADLs (bathing, care of mouth/teeth, toileting, grooming, dressing, etc.)  - Assess/evaluate cause of self-care deficits   - Assess range of motion  - Assess patient's mobility  - Assess patient's need for assistive devices and provide as appropriate  - Encourage maximum independence but intervene and supervise when necessary  - Involve family in performance of ADLs  - Assess for home care needs following discharge   - Consider OT consult to assist with ADL evaluation and planning for discharge  - Provide patient education as appropriate  Outcome: Progressing  Goal: Maintain proper alignment of affected body part  Description: INTERVENTIONS:  - Support, maintain and protect limb and body alignment  - Provide patient/ family with appropriate education  Outcome: Progressing

## 2023-08-05 NOTE — ASSESSMENT & PLAN NOTE
8/5/2023 patient continues to go in and out of afib with RVR, denies palpitations or anxiety  · Subsequently started on Cardizem drip, cardiology consulted and added flecainide. · [id number] 6  · Likely in the setting postoperative trigger with somewhat superimposed uncontrolled pain    · Plan  · As per cardio, started metoprolol 100mg, cardizem 30mg oral and now on flecainide, will reassess.

## 2023-08-05 NOTE — ASSESSMENT & PLAN NOTE
Blood Pressure: 141/89  · Continue home meds amlodipine 5 mg once a day after dinner, losartan 50 milligrams twice daily  · Holding home metoprolol succinate in the setting of A-fib with RVR currently on Cardizem drip likely transition tomorrow.

## 2023-08-06 LAB
ANION GAP SERPL CALCULATED.3IONS-SCNC: 8 MMOL/L
ATRIAL RATE: 74 BPM
BUN SERPL-MCNC: 15 MG/DL (ref 5–25)
CALCIUM SERPL-MCNC: 9.4 MG/DL (ref 8.4–10.2)
CHLORIDE SERPL-SCNC: 99 MMOL/L (ref 96–108)
CO2 SERPL-SCNC: 26 MMOL/L (ref 21–32)
CREAT SERPL-MCNC: 0.66 MG/DL (ref 0.6–1.3)
ERYTHROCYTE [DISTWIDTH] IN BLOOD BY AUTOMATED COUNT: 13 % (ref 11.6–15.1)
GFR SERPL CREATININE-BSD FRML MDRD: 84 ML/MIN/1.73SQ M
GLUCOSE SERPL-MCNC: 122 MG/DL (ref 65–140)
GLUCOSE SERPL-MCNC: 141 MG/DL (ref 65–140)
GLUCOSE SERPL-MCNC: 151 MG/DL (ref 65–140)
GLUCOSE SERPL-MCNC: 157 MG/DL (ref 65–140)
GLUCOSE SERPL-MCNC: 298 MG/DL (ref 65–140)
HCT VFR BLD AUTO: 34.9 % (ref 34.8–46.1)
HGB BLD-MCNC: 11.5 G/DL (ref 11.5–15.4)
MCH RBC QN AUTO: 30.7 PG (ref 26.8–34.3)
MCHC RBC AUTO-ENTMCNC: 33 G/DL (ref 31.4–37.4)
MCV RBC AUTO: 93 FL (ref 82–98)
PLATELET # BLD AUTO: 322 THOUSANDS/UL (ref 149–390)
PMV BLD AUTO: 9.6 FL (ref 8.9–12.7)
POTASSIUM SERPL-SCNC: 4 MMOL/L (ref 3.5–5.3)
QRS AXIS: 60 DEGREES
QRSD INTERVAL: 96 MS
QT INTERVAL: 378 MS
QTC INTERVAL: 422 MS
RBC # BLD AUTO: 3.74 MILLION/UL (ref 3.81–5.12)
SODIUM SERPL-SCNC: 133 MMOL/L (ref 135–147)
T WAVE AXIS: 51 DEGREES
VENTRICULAR RATE: 75 BPM
WBC # BLD AUTO: 8.15 THOUSAND/UL (ref 4.31–10.16)

## 2023-08-06 PROCEDURE — 80048 BASIC METABOLIC PNL TOTAL CA: CPT | Performed by: INTERNAL MEDICINE

## 2023-08-06 PROCEDURE — 93010 ELECTROCARDIOGRAM REPORT: CPT | Performed by: INTERNAL MEDICINE

## 2023-08-06 PROCEDURE — 99233 SBSQ HOSP IP/OBS HIGH 50: CPT | Performed by: INTERNAL MEDICINE

## 2023-08-06 PROCEDURE — 82948 REAGENT STRIP/BLOOD GLUCOSE: CPT

## 2023-08-06 PROCEDURE — 93005 ELECTROCARDIOGRAM TRACING: CPT

## 2023-08-06 PROCEDURE — 85027 COMPLETE CBC AUTOMATED: CPT | Performed by: INTERNAL MEDICINE

## 2023-08-06 RX ADMIN — DILTIAZEM HYDROCHLORIDE 30 MG: 30 TABLET, FILM COATED ORAL at 05:27

## 2023-08-06 RX ADMIN — ACETAMINOPHEN 650 MG: 325 TABLET, FILM COATED ORAL at 19:57

## 2023-08-06 RX ADMIN — DILTIAZEM HYDROCHLORIDE 30 MG: 30 TABLET, FILM COATED ORAL at 12:49

## 2023-08-06 RX ADMIN — ACETAMINOPHEN 650 MG: 325 TABLET, FILM COATED ORAL at 09:25

## 2023-08-06 RX ADMIN — ZOLPIDEM TARTRATE 10 MG: 5 TABLET ORAL at 21:10

## 2023-08-06 RX ADMIN — INSULIN LISPRO 1 UNITS: 100 INJECTION, SOLUTION INTRAVENOUS; SUBCUTANEOUS at 12:50

## 2023-08-06 RX ADMIN — LOSARTAN POTASSIUM 50 MG: 50 TABLET, FILM COATED ORAL at 21:10

## 2023-08-06 RX ADMIN — METOPROLOL SUCCINATE 100 MG: 100 TABLET, EXTENDED RELEASE ORAL at 17:22

## 2023-08-06 RX ADMIN — EZETIMIBE 10 MG: 10 TABLET ORAL at 09:16

## 2023-08-06 RX ADMIN — DIGOXIN 250 MCG: 0.25 INJECTION INTRAMUSCULAR; INTRAVENOUS at 03:36

## 2023-08-06 RX ADMIN — DILTIAZEM HYDROCHLORIDE 30 MG: 30 TABLET, FILM COATED ORAL at 00:00

## 2023-08-06 RX ADMIN — METOPROLOL SUCCINATE 100 MG: 100 TABLET, EXTENDED RELEASE ORAL at 09:16

## 2023-08-06 RX ADMIN — APIXABAN 5 MG: 5 TABLET, FILM COATED ORAL at 21:10

## 2023-08-06 RX ADMIN — DILTIAZEM HYDROCHLORIDE 30 MG: 30 TABLET, FILM COATED ORAL at 23:04

## 2023-08-06 RX ADMIN — DULOXETINE HYDROCHLORIDE 30 MG: 30 CAPSULE, DELAYED RELEASE ORAL at 09:16

## 2023-08-06 RX ADMIN — FLECAINIDE ACETATE 100 MG: 50 TABLET ORAL at 09:16

## 2023-08-06 RX ADMIN — FLECAINIDE ACETATE 100 MG: 50 TABLET ORAL at 21:10

## 2023-08-06 RX ADMIN — GABAPENTIN 300 MG: 300 CAPSULE ORAL at 21:10

## 2023-08-06 RX ADMIN — LOSARTAN POTASSIUM 50 MG: 50 TABLET, FILM COATED ORAL at 09:16

## 2023-08-06 RX ADMIN — APIXABAN 5 MG: 5 TABLET, FILM COATED ORAL at 09:16

## 2023-08-06 RX ADMIN — ROPINIROLE 0.5 MG: 0.25 TABLET, FILM COATED ORAL at 21:10

## 2023-08-06 RX ADMIN — DILTIAZEM HYDROCHLORIDE 30 MG: 30 TABLET, FILM COATED ORAL at 17:22

## 2023-08-06 RX ADMIN — INSULIN LISPRO 1 UNITS: 100 INJECTION, SOLUTION INTRAVENOUS; SUBCUTANEOUS at 09:18

## 2023-08-06 RX ADMIN — SENNOSIDES 8.6 MG: 8.6 TABLET, FILM COATED ORAL at 09:16

## 2023-08-06 NOTE — ASSESSMENT & PLAN NOTE
Wt Readings from Last 3 Encounters:   08/05/23 71.8 kg (158 lb 6.4 oz)   07/03/23 73.9 kg (163 lb)   06/26/23 73.1 kg (161 lb 2 oz)     Last echo June 2023 preop evaluation.   LVEF 55%, G2DD  Does not seem to be in exacerbation  Home regimen Lasix 20 mg as needed was recommended by Dr. Chidi Kurtz cardiology on the outpatient setting if needed can increase to 40 mg    Plan    · Cardiology following; appreciate recs  · Hold Lasix at this time  · Daily weights, strict I's and O  · Sodium 2 g, restriction fluid 2 L

## 2023-08-06 NOTE — ASSESSMENT & PLAN NOTE
Lab Results   Component Value Date    HGBA1C 6.6 (H) 07/03/2023       Recent Labs     08/05/23  1541 08/05/23  2330 08/06/23  0725 08/06/23  1046   POCGLU 207* 135 151* 157*       Blood Sugar Average: Last 72 hrs:  Not on medication  · Continue carbohydrate controlled diet  · Continue sliding scale  · Glucose check per protocol

## 2023-08-06 NOTE — PROGRESS NOTES
[address] [facility]  Progress Note  Name: Jessie Chavez  MRN: [id number]  Unit/Bed#: S -01 I Date of Admission: 8/4/2023   Date of Service: 8/6/2023 I Hospital Day: 2    Assessment/Plan   * A-fib with RVR [facility])  Assessment & Plan  8/5/2023 patient continues to go in and out of afib with RVR, denies palpitations or anxiety  · Subsequently started on Cardizem drip, cardiology consulted and added flecainide  · Weaned off Cardizem gtt as of 0930 08/06/2023  · [id number] 6  · Likely in the setting postoperative trigger with somewhat superimposed uncontrolled pain    Plan  · As per cards, metoprolol 100mg, cardizem 30mg PO q6H, flecainide 100 mg q12H  · Continue monitoring on telemetry  · NPO @ midnight as pt has remained in Afib and will need SHANNAN guided cardioversion tomorrow. If unsuccessful then will require second ablation    Chronic diastolic CHF (congestive heart failure) ([address])  Assessment & Plan  Wt Readings from Last 3 Encounters:   08/05/23 71.8 kg (158 lb 6.4 oz)   07/03/23 73.9 kg (163 lb)   06/26/23 73.1 kg (161 lb 2 oz)     Last echo June 2023 preop evaluation. LVEF 55%, G2DD  Does not seem to be in exacerbation  Home regimen Lasix 20 mg as needed was recommended by Dr. David Brody cardiology on the outpatient setting if needed can increase to 40 mg    Plan    · Cardiology following; appreciate recs  · Hold Lasix at this time  · Daily weights, strict I's and O  · Sodium 2 g, restriction fluid 2 L          Hyponatremia  Assessment & Plan  Recent Labs     08/04/23  1340 08/05/23  0600 08/06/23  0352   SODIUM 132* 136 133*       · POA sodium noted 132, corrected for glucose 133 mild  · Likely in the setting of recent surgical procedure  · BMP a.m. Abnormal CT of the chest  Assessment & Plan  · 08/04/2023 CTA PE "Mildly heterogeneous liver with slight lobulation along the left lobe. Early cirrhosis is not excluded and follow-up with ultrasound is recommended.  There is a 5 mm left lower lobe nodule on series 304 image 192, stable from 2021. There is no tracheal or endobronchial lesion. Incidental discovery of one or more thyroid nodule(s) measuring more than 1.5 cm and without suspicious features "  · Pending RUQ results  · Recommend follow-up with primary care provider for nonurgent thyroid ultrasound    Anemia  Assessment & Plan  Recent Labs     08/04/23  1340 08/05/23  0600 08/06/23  0352   HGB 10.0* 11.1* 11.5       · Hgb trending upward to 11.1, baseline seems to be between 13-14, likely due to recent orthopedic procedure  · No overt bleeding, no melena no hematochezia  · Continue trending CBC a.m. Type 2 diabetes mellitus without complication, without long-term current use of insulin [facility])  Assessment & Plan  Lab Results   Component Value Date    HGBA1C 6.6 (H) 07/03/2023       Recent Labs     08/05/23  1541 08/05/23  2330 08/06/23  0725 08/06/23  1046   POCGLU 207* 135 151* 157*       Blood Sugar Average: Last 72 hrs:  Not on medication  · Continue carbohydrate controlled diet  · Continue sliding scale  · Glucose check per protocol    Stage 3b chronic kidney disease [facility])  Assessment & Plan  Lab Results   Component Value Date    EGFR 84 08/06/2023    EGFR 81 08/05/2023    EGFR 71 08/04/2023    CREATININE 0.66 08/06/2023    CREATININE 0.71 08/05/2023    CREATININE 0.79 08/04/2023     Avoid nephrotoxic medications where possible  Daily BMP, mag    Essential hypertension  Assessment & Plan  Blood Pressure: 113/68  · Continue home meds amlodipine 5 mg once a day after dinner, losartan 50 milligrams twice daily                 VTE Pharmacologic Prophylaxis:   VTE Score: 10 High Risk (Score >/= 5) - Pharmacological DVT Prophylaxis Ordered: Apixaban (Eliquis). Sequential Compression Devices Ordered. Mechanical VTE Prophylaxis in Place: Yes    Patient Centered Rounds: I have performed bedside rounds with nursing staff today.     Discussions with Specialists or Other Care Team Provider: SLIM attending    Education and Discussions with Family / Patient: Patient declined call to . She would like Cardiologist Dr. Cabrera Hoffman to update her daughter    Current Length of Stay: 2 day(s)    Current Patient Status: Inpatient     Discharge Plan / Estimated Discharge Date: Anticipate discharge in 48 hrs to discharge location to be determined pending rehab evaluations. Code Status: Level 3 - DNAR and DNI      Subjective:   Overnight events: 6 runs of Vtach at ~midnight on telemetry. Asymptomatic. Pt sitting comfortably in bed this AM. She remains in Afib w/RVR. She denies CP, SOB, palpitations, or anxiety. No LE swelling. She feels well. Reports some pain in her operated knee. She has no other concerns at this time. Objective:     Vitals:   Temp (24hrs), Av.1 °F (36.7 °C), Min:97.6 °F (36.4 °C), Max:98.4 °F (36.9 °C)    Temp:  [97.6 °F (36.4 °C)-98.4 °F (36.9 °C)] 98 °F (36.7 °C)  HR:  [] 68  Resp:  [17-18] 17  BP: (113-160)/(60-92) 113/68  SpO2:  [90 %-97 %] 97 %  Body mass index is 29.93 kg/m². Input and Output Summary (last 24 hours): Intake/Output Summary (Last 24 hours) at 2023 1404  Last data filed at 2023 0300  Gross per 24 hour   Intake 280 ml   Output 1450 ml   Net -1170 ml       Physical Exam:     Physical Exam  Vitals and nursing note reviewed. Constitutional:       General: She is not in acute distress. Appearance: She is well-developed. HENT:      Head: Normocephalic and atraumatic. Eyes:      Conjunctiva/sclera: Conjunctivae normal.   Cardiovascular:      Rate and Rhythm: Normal rate. Rhythm irregular. Heart sounds: No murmur heard. No gallop. Comments: Irregular pulses palpated bilaterally  Pulmonary:      Effort: Pulmonary effort is normal. No respiratory distress. Breath sounds: Normal breath sounds. Abdominal:      Palpations: Abdomen is soft. Tenderness: There is no abdominal tenderness.    Musculoskeletal: General: No swelling. Cervical back: Neck supple. Skin:     General: Skin is warm and dry. Capillary Refill: Capillary refill takes less than 2 seconds. Neurological:      Mental Status: She is alert. Psychiatric:         Mood and Affect: Mood normal.          Additional Data:     Labs:  Results from last 7 days   Lab Units 08/06/23  0352 08/05/23  0600 08/04/23  1340   WBC Thousand/uL 8.15   < > 7.61   HEMOGLOBIN g/dL 11.5   < > 10.0*   HEMATOCRIT % 34.9   < > 30.9*   PLATELETS Thousands/uL 322   < > 278   NEUTROS PCT %  --   --  62   LYMPHS PCT %  --   --  23   MONOS PCT %  --   --  11   EOS PCT %  --   --  3    < > = values in this interval not displayed. Results from last 7 days   Lab Units 08/06/23  0352 08/05/23  0600 08/04/23  1340   SODIUM mmol/L 133*   < > 132*   POTASSIUM mmol/L 4.0   < > 4.2   CHLORIDE mmol/L 99   < > 98   CO2 mmol/L 26   < > 26   BUN mg/dL 15   < > 20   CREATININE mg/dL 0.66   < > 0.79   ANION GAP mmol/L 8   < > 8   CALCIUM mg/dL 9.4   < > 9.0   ALBUMIN g/dL  --   --  3.7   TOTAL BILIRUBIN mg/dL  --   --  1.07*   ALK PHOS U/L  --   --  63   ALT U/L  --   --  13   AST U/L  --   --  17   GLUCOSE RANDOM mg/dL 141*   < > 159*    < > = values in this interval not displayed. Results from last 7 days   Lab Units 08/04/23  1340   INR  1.37*     Results from last 7 days   Lab Units 08/06/23  1046 08/06/23  0725 08/05/23  2330 08/05/23  1541 08/05/23  1207 08/05/23  0819 08/04/23  2121 08/04/23  1945   POC GLUCOSE mg/dl 157* 151* 135 207* 134 150* 246* 183*               Imaging:   VAS lower limb venous duplex study, complete bilateral   Final Result by Anton Rubin MD (08/05 1957)      CTA ED chest PE study   Final Result by Vani Ocasio MD (08/04 1653)      1. No acute pulmonary embolism. 2.  Mildly heterogeneous liver with slight lobulation along the left lobe. Early cirrhosis is not excluded and follow-up with ultrasound is recommended.       3.  Incidental thyroid nodule(s) for which nonemergent thyroid. The study was marked in [facility] for immediate notification. Is recommended. Workstation performed: [id number]         XR chest 2 views   ED Interpretation by Irais Dong MD (08/04 1415)   No signs of acute cardiopulmonary disease      Final Result by Ashlee Marley MD (08/04 1504)      No acute cardiopulmonary disease.                   Workstation performed: [id number]         US right upper quadrant    (Results Pending)         Recent Cultures (last 7 days):           Lines/Drains:  Invasive Devices     Peripheral Intravenous Line  Duration           Peripheral IV 08/04/23 Left Antecubital 2 days                Telemetry:   Telemetry Orders (From admission, onward)             24 Hour Telemetry Monitoring  Continuous x 24 Hours (Telem)        Question:  Reason for 24 Hour Telemetry  Answer:  Decompensated CHF- and any one of the following: continuous diuretic infusion or total diuretic dose >200 mg daily, associated electrolyte derangement (I.e. K < 3.0), ionotropic drip (continuous infusion), hx of ventricular arrhythmia, or new EF < 35%                    Last 24 Hours Medication List:   Current Facility-Administered Medications   Medication Dose Route Frequency Provider Last Rate   • acetaminophen  650 mg Oral Q6H PRN Jairon Contreras MD     • apixaban  5 mg Oral BID Jairon Contreras MD     • diltiazem  30 mg Oral Q6H [address], PA-C     • DULoxetine  30 mg Oral Daily Jairon Contreras MD     • ezetimibe  10 mg Oral Daily Jairon Contreras MD     • [START ON 8/7/2023] famotidine  20 mg Oral Every Other Day Jairon Contreras MD     • flecainide  100 mg Oral Q12H Amy Lim MD     • gabapentin  300 mg Oral HS Jairon Contreras MD     • insulin lispro  1-5 Units Subcutaneous TID [address] MD     • losartan  50 mg Oral BID Jairon Contreras MD • metoprolol succinate  100 mg Oral BID Esperanza Rust PA-C     • oxyCODONE  5 mg Oral Q4H PRN Marco A Ghosh MD     • oxyCODONE  2.5 mg Oral Q6H PRN Marco A Ghosh MD     • polyethylene glycol  17 g Oral Daily PRN Marco A Ghosh MD     • rOPINIRole  0.5 mg Oral HS Marco A Ghosh MD     • senna  1 tablet Oral BID Marco A Ghosh MD     • zolpidem  10 mg Oral HS PRN Marco A Ghosh MD          Today, Patient Was Seen By: Eunice Barnhart MD    ** Please Note: This note has been constructed using a voice recognition system.  **

## 2023-08-06 NOTE — ASSESSMENT & PLAN NOTE
Recent Labs     08/04/23  1340 08/05/23  0600 08/06/23  0352   SODIUM 132* 136 133*       · POA sodium noted 132, corrected for glucose 133 mild  · Likely in the setting of recent surgical procedure  · BMP a.m.

## 2023-08-06 NOTE — ASSESSMENT & PLAN NOTE
8/5/2023 patient continues to go in and out of afib with RVR, denies palpitations or anxiety  · Subsequently started on Cardizem drip, cardiology consulted and added flecainide  · Weaned off Cardizem gtt as of 0930 08/06/2023  · [id number] 6  · Likely in the setting postoperative trigger with somewhat superimposed uncontrolled pain    Plan  · As per cards, metoprolol 100mg, cardizem 30mg PO q6H, flecainide 100 mg q12H  · Continue monitoring on telemetry  · NPO @ midnight as pt has remained in Afib and will need SHANNAN guided cardioversion tomorrow.  If unsuccessful then will require second ablation

## 2023-08-06 NOTE — ASSESSMENT & PLAN NOTE
Blood Pressure: 113/68  · Continue home meds amlodipine 5 mg once a day after dinner, losartan 50 milligrams twice daily  · Holding home metoprolol succinate in the setting of A-fib with RVR currently on Cardizem drip likely transition tomorrow.

## 2023-08-06 NOTE — ASSESSMENT & PLAN NOTE
Lab Results   Component Value Date    EGFR 84 08/06/2023    EGFR 81 08/05/2023    EGFR 71 08/04/2023    CREATININE 0.66 08/06/2023    CREATININE 0.71 08/05/2023    CREATININE 0.79 08/04/2023     Avoid nephrotoxic medications where possible  Daily BMP, mag

## 2023-08-06 NOTE — PROGRESS NOTES
Progress Note - Cardiology Team 1  Sanket Anderson 77 y.o. female MRN: [id number]  Unit/Bed#: S -01 Encounter: [id number]        Principal Problem:    A-fib with RVR ([address])  Active Problems:    Essential hypertension    Chronic diastolic CHF (congestive heart failure) (HCC)    Hyponatremia    Type 2 diabetes mellitus without complication, without long-term current use of insulin (HCC)    Anemia    Abnormal CT of the chest      Assessment:     1. PAF with RVR: Reported palpitations and dyspnea on exertion since recent total knee replacement. Device interrogation revealed 100% AF burden with times in RVR. EKG in the ED revealed AF with rates in the 120s. Patient received 15 mg IV Cardizem bolus and was started on a Cardizem infusion . Typically also maintained on Toprol-XL 75 mg BID which was held  held. • [id number]: Maintained on Eliquis 5 mg BID  • She has had history of breakthrough AF on amiodarone in the past.  Prolonged QTc with torsades on sotalol  • H/o ablation x2     2. Chronic HFpEF: Volume status appears stable. • Echocardiogram 6/8/2023: EF 55% with no WMA, G2DD, normal RV function, mild LA dilatation, moderate MR, mild-moderate TR with PASP of 64 mmHg. • Diuretic regimen: Lasix 20 mg as needed     3. SSS: s/p MDT DC PPM implanted 3/2021. • Device interrogation 8/4/2023: MDT DC PPM (MRI conditional). AP 0%,  0%, 100% AF burden at times with RVR.     4.  Essential hypertension: controlled on amlodipine 5 mg daily, losartan 50 100mg BID     5. Hyperlipidemia: Lipid panel 7/2023 , TG 68, HDL 53, LDL 96 on Zetia 10 mg daily.     6. Type II DM: Hemoglobin A1c 6.6.   Management per primary team.     7.  Left total knee replacement: On 7/31        Plan/Recommendations:  • Restarted Toprol-XL at higher dose of 100 mg BID  • Weaned Cardizem and started oral Cardizem 30 mg every 6 hours  • Started flecanide 100mg BID  • Continue Eliquis for anticoagulation  • Npo after midnight tonight for SHANNAN/DCCV if she does not convert    Subjective/Objective   Chief Complaint/subjective  No events overnight  Controlled afib  Palpitations improved        Vitals: /68   Pulse 68   Temp 98 °F (36.7 °C)   Resp 17   Wt 71.8 kg (158 lb 6.4 oz)   LMP 02/01/1990 (Within Weeks)   SpO2 97%   BMI 29.93 kg/m²     Vitals:    08/05/23 0600   Weight: 71.8 kg (158 lb 6.4 oz)     Orthostatic Blood Pressures    Flowsheet Row Most Recent Value   Blood Pressure 113/68 filed at 08/06/2023 0725   Patient Position - Orthostatic VS Lying filed at 08/06/2023 0029            Intake/Output Summary (Last 24 hours) at 8/6/2023 0954  Last data filed at 8/6/2023 0300  Gross per 24 hour   Intake 280 ml   Output 1450 ml   Net -1170 ml       Invasive Devices     Peripheral Intravenous Line  Duration           Peripheral IV 08/04/23 Left Antecubital 1 day                Current Facility-Administered Medications   Medication Dose Route Frequency   • acetaminophen (TYLENOL) tablet 650 mg  650 mg Oral Q6H PRN   • amLODIPine (NORVASC) tablet 5 mg  5 mg Oral After Dinner   • apixaban (ELIQUIS) tablet 5 mg  5 mg Oral BID   • diltiazem (CARDIZEM) tablet 30 mg  30 mg Oral Q6H [address]   • DULoxetine (CYMBALTA) delayed release capsule 30 mg  30 mg Oral Daily   • ezetimibe (ZETIA) tablet 10 mg  10 mg Oral Daily   • [START ON 8/7/2023] famotidine (PEPCID) tablet 20 mg  20 mg Oral Every Other Day   • flecainide (TAMBOCOR) tablet 100 mg  100 mg Oral Q12H [address]   • gabapentin (NEURONTIN) capsule 300 mg  300 mg Oral HS   • insulin lispro (HumaLOG) 100 units/mL subcutaneous injection 1-5 Units  1-5 Units Subcutaneous TID AC   • losartan (COZAAR) tablet 50 mg  50 mg Oral BID   • metoprolol succinate (TOPROL-XL) 24 hr tablet 100 mg  100 mg Oral BID   • oxyCODONE (ROXICODONE) IR tablet 5 mg  5 mg Oral Q4H PRN   • oxyCODONE (ROXICODONE) split tablet 2.5 mg  2.5 mg Oral Q6H PRN   • polyethylene glycol (MIRALAX) packet 17 g  17 g Oral Daily PRN   • rOPINIRole (REQUIP) tablet 0.5 mg  0.5 mg Oral HS   • senna (SENOKOT) tablet 8.6 mg  1 tablet Oral BID   • zolpidem (AMBIEN) tablet 10 mg  10 mg Oral HS PRN         Physical Exam: /68   Pulse 68   Temp 98 °F (36.7 °C)   Resp 17   Wt 71.8 kg (158 lb 6.4 oz)   LMP 02/01/1990 (Within Weeks)   SpO2 97%   BMI 29.93 kg/m²     General Appearance:    Alert, cooperative, no distress, appears stated age   Head:    Normocephalic, no scleral icterus   Eyes:    PERRL   Nose:   Nares normal, septum midline, no drainage    Throat:   Lips, mucosa, and tongue normal   Neck:   Supple, symmetrical, trachea midline,              Lungs:     Clear to auscultation bilaterally, respirations unlabored   Chest Wall:    No tenderness or deformity    Heart:    Irregular rate and rhythm, S1 and S2 normal, no murmur, rub   or gallop   Abdomen:     Soft, non-tender, bowel sounds active all four quadrants,     no masses, no organomegaly   Extremities:   Extremities normal, atraumatic, no cyanosis or edema   Pulses:   2+ and symmetric all extremities   Skin:   Skin color, texture, turgor normal, no rashes or lesions   Neurologic:   Alert and oriented to person place and time, no focal deficits                 Lab Results:   Recent Results (from the past 72 hour(s))   ECG 12 lead    Collection Time: 08/04/23  1:17 PM   Result Value Ref Range    Ventricular Rate 122 BPM    Atrial Rate 144 BPM    UT Interval  ms    QRSD Interval 86 ms    QT Interval 334 ms    QTC Interval 475 ms    P Axis  degrees    QRS Axis 87 degrees    T Wave Axis -16 degrees   CBC and differential    Collection Time: 08/04/23  1:40 PM   Result Value Ref Range    WBC 7.61 4.31 - 10.16 Thousand/uL    RBC 3.27 (L) 3.81 - 5.12 Million/uL    Hemoglobin 10.0 (L) 11.5 - 15.4 g/dL    Hematocrit 30.9 (L) 34.8 - 46.1 %    MCV 95 82 - 98 fL    MCH 30.6 26.8 - 34.3 pg    MCHC 32.4 31.4 - 37.4 g/dL    RDW 13.2 11.6 - 15.1 %    MPV 10.1 8.9 - 12.7 fL    Platelets 485 591 - 996 Thousands/uL    nRBC 0 /100 WBCs    Neutrophils Relative 62 43 - 75 %    Immat GRANS % 0 0 - 2 %    Lymphocytes Relative 23 14 - 44 %    Monocytes Relative 11 4 - 12 %    Eosinophils Relative 3 0 - 6 %    Basophils Relative 1 0 - 1 %    Neutrophils Absolute 4.72 1.85 - 7.62 Thousands/µL    Immature Grans Absolute 0.03 0.00 - 0.20 Thousand/uL    Lymphocytes Absolute 1.72 0.60 - 4.47 Thousands/µL    Monocytes Absolute 0.84 0.17 - 1.22 Thousand/µL    Eosinophils Absolute 0.24 0.00 - 0.61 Thousand/µL    Basophils Absolute 0.06 0.00 - 0.10 Thousands/µL   Comprehensive metabolic panel    Collection Time: 08/04/23  1:40 PM   Result Value Ref Range    Sodium 132 (L) 135 - 147 mmol/L    Potassium 4.2 3.5 - 5.3 mmol/L    Chloride 98 96 - 108 mmol/L    CO2 26 21 - 32 mmol/L    ANION GAP 8 mmol/L    BUN 20 5 - 25 mg/dL    Creatinine 0.79 0.60 - 1.30 mg/dL    Glucose 159 (H) 65 - 140 mg/dL    Calcium 9.0 8.4 - 10.2 mg/dL    AST 17 13 - 39 U/L    ALT 13 7 - 52 U/L    Alkaline Phosphatase 63 34 - 104 U/L    Total Protein 6.7 6.4 - 8.4 g/dL    Albumin 3.7 3.5 - 5.0 g/dL    Total Bilirubin 1.07 (H) 0.20 - 1.00 mg/dL    eGFR 71 ml/min/1.73sq m   B-Type Natriuretic Peptide(BNP)    Collection Time: 08/04/23  1:40 PM   Result Value Ref Range     (H) 0 - 100 pg/mL   HS Troponin 0hr (reflex protocol)    Collection Time: 08/04/23  1:40 PM   Result Value Ref Range    hs TnI 0hr 16 "Refer to ACS Flowchart"- see link ng/L   D-dimer, quantitative    Collection Time: 08/04/23  1:40 PM   Result Value Ref Range    D-Dimer, Quant 1.44 (H) <0.50 ug/ml FEU   Protime-INR    Collection Time: 08/04/23  1:40 PM   Result Value Ref Range    Protime 17.1 (H) 11.6 - 14.5 seconds    INR 1.37 (H) 0.84 - 1.19   TSH, 3rd generation with Free T4 reflex    Collection Time: 08/04/23  1:40 PM   Result Value Ref Range    TSH 3RD GENERATON 2.439 0.450 - 4.500 uIU/mL   HS Troponin I 2hr    Collection Time: 08/04/23  3:49 PM   Result Value Ref Range    hs TnI 2hr 14 "Refer to ACS Flowchart"- see link ng/L    Delta 2hr hsTnI -2 <20 ng/L   Fingerstick Glucose (POCT)    Collection Time: 08/04/23  7:45 PM   Result Value Ref Range    POC Glucose 183 (H) 65 - 140 mg/dl   HS Troponin I 4hr    Collection Time: 08/04/23  8:00 PM   Result Value Ref Range    hs TnI 4hr 16 "Refer to ACS Flowchart"- see link ng/L    Delta 4hr hsTnI 0 <20 ng/L   Fingerstick Glucose (POCT)    Collection Time: 08/04/23  9:21 PM   Result Value Ref Range    POC Glucose 246 (H) 65 - 140 mg/dl   Basic metabolic panel    Collection Time: 08/05/23  6:00 AM   Result Value Ref Range    Sodium 136 135 - 147 mmol/L    Potassium 4.0 3.5 - 5.3 mmol/L    Chloride 101 96 - 108 mmol/L    CO2 27 21 - 32 mmol/L    ANION GAP 8 mmol/L    BUN 16 5 - 25 mg/dL    Creatinine 0.71 0.60 - 1.30 mg/dL    Glucose 117 65 - 140 mg/dL    Calcium 8.9 8.4 - 10.2 mg/dL    eGFR 81 ml/min/1.73sq m   Magnesium    Collection Time: 08/05/23  6:00 AM   Result Value Ref Range    Magnesium 2.0 1.9 - 2.7 mg/dL   CBC (With Platelets)    Collection Time: 08/05/23  6:00 AM   Result Value Ref Range    WBC 6.16 4.31 - 10.16 Thousand/uL    RBC 3.55 (L) 3.81 - 5.12 Million/uL    Hemoglobin 11.1 (L) 11.5 - 15.4 g/dL    Hematocrit 33.4 (L) 34.8 - 46.1 %    MCV 94 82 - 98 fL    MCH 31.3 26.8 - 34.3 pg    MCHC 33.2 31.4 - 37.4 g/dL    RDW 13.2 11.6 - 15.1 %    Platelets 692 799 - 804 Thousands/uL    MPV 9.9 8.9 - 12.7 fL   Fingerstick Glucose (POCT)    Collection Time: 08/05/23  8:19 AM   Result Value Ref Range    POC Glucose 150 (H) 65 - 140 mg/dl   Fingerstick Glucose (POCT)    Collection Time: 08/05/23 12:07 PM   Result Value Ref Range    POC Glucose 134 65 - 140 mg/dl   Fingerstick Glucose (POCT)    Collection Time: 08/05/23  3:41 PM   Result Value Ref Range    POC Glucose 207 (H) 65 - 140 mg/dl   Fingerstick Glucose (POCT)    Collection Time: 08/05/23 11:30 PM   Result Value Ref Range    POC Glucose 135 65 - 140 mg/dl   Basic metabolic panel Collection Time: 08/06/23  3:52 AM   Result Value Ref Range    Sodium 133 (L) 135 - 147 mmol/L    Potassium 4.0 3.5 - 5.3 mmol/L    Chloride 99 96 - 108 mmol/L    CO2 26 21 - 32 mmol/L    ANION GAP 8 mmol/L    BUN 15 5 - 25 mg/dL    Creatinine 0.66 0.60 - 1.30 mg/dL    Glucose 141 (H) 65 - 140 mg/dL    Calcium 9.4 8.4 - 10.2 mg/dL    eGFR 84 ml/min/1.73sq m   CBC and Platelet    Collection Time: 08/06/23  3:52 AM   Result Value Ref Range    WBC 8.15 4.31 - 10.16 Thousand/uL    RBC 3.74 (L) 3.81 - 5.12 Million/uL    Hemoglobin 11.5 11.5 - 15.4 g/dL    Hematocrit 34.9 34.8 - 46.1 %    MCV 93 82 - 98 fL    MCH 30.7 26.8 - 34.3 pg    MCHC 33.0 31.4 - 37.4 g/dL    RDW 13.0 11.6 - 15.1 %    Platelets 295 259 - 549 Thousands/uL    MPV 9.6 8.9 - 12.7 fL   Fingerstick Glucose (POCT)    Collection Time: 08/06/23  7:25 AM   Result Value Ref Range    POC Glucose 151 (H) 65 - 140 mg/dl     Imaging: I have personally reviewed pertinent reports. Tele- controlled afib      Counseling / Coordination of Care  Total time spent today 20 minutes. Greater than 50% of total time was spent with the patient and / or family counseling and / or coordination of care.

## 2023-08-06 NOTE — ASSESSMENT & PLAN NOTE
Recent Labs     08/04/23  1340 08/05/23  0600 08/06/23  0352   HGB 10.0* 11.1* 11.5       · Hgb trending upward to 11.1, baseline seems to be between 13-14, likely due to recent orthopedic procedure  · No overt bleeding, no melena no hematochezia  · Continue trending CBC a.m.

## 2023-08-06 NOTE — PLAN OF CARE
Problem: Potential for Falls  Goal: Patient will remain free of falls  Description: INTERVENTIONS:  - Educate patient/family on patient safety including physical limitations  - Instruct patient to call for assistance with activity   - Consult OT/PT to assist with strengthening/mobility   - Keep Call bell within reach  - Keep bed low and locked with side rails adjusted as appropriate  - Keep care items and personal belongings within reach  - Initiate and maintain comfort rounds  - Make Fall Risk Sign visible to staff  - Offer Toileting every 2 Hours, in advance of need  - Initiate/Maintain bed/chair alarm  - Obtain necessary fall risk management equipment  - Apply yellow socks and bracelet for high fall risk patients  - Consider moving patient to room near nurses station  Outcome: Progressing     Problem: MOBILITY - ADULT  Goal: Maintain or return to baseline ADL function  Description: INTERVENTIONS:  -  Assess patient's ability to carry out ADLs; assess patient's baseline for ADL function and identify physical deficits which impact ability to perform ADLs (bathing, care of mouth/teeth, toileting, grooming, dressing, etc.)  - Assess/evaluate cause of self-care deficits   - Assess range of motion  - Assess patient's mobility; develop plan if impaired  - Assess patient's need for assistive devices and provide as appropriate  - Encourage maximum independence but intervene and supervise when necessary  - Involve family in performance of ADLs  - Assess for home care needs following discharge   - Consider OT consult to assist with ADL evaluation and planning for discharge  - Provide patient education as appropriate  Outcome: Progressing  Goal: Maintains/Returns to pre admission functional level  Description: INTERVENTIONS:  - Perform BMAT or MOVE assessment daily.   - Set and communicate daily mobility goal to care team and patient/family/caregiver.    - Collaborate with rehabilitation services on mobility goals if consulted  -- Stand patient 2 times a day  - Ambulate patient 2 times a day  - Out of bed to chair 2 times a day   - Out of bed for meals 2 times a day  - Out of bed for toileting  - Record patient progress and toleration of activity level   Outcome: Progressing     Problem: CARDIOVASCULAR - ADULT  Goal: Maintains optimal cardiac output and hemodynamic stability  Description: INTERVENTIONS:  - Monitor I/O, vital signs and rhythm  - Monitor for S/S and trends of decreased cardiac output  - Administer and titrate ordered vasoactive medications to optimize hemodynamic stability  - Assess quality of pulses, skin color and temperature  - Assess for signs of decreased coronary artery perfusion  - Instruct patient to report change in severity of symptoms  Outcome: Progressing  Goal: Absence of cardiac dysrhythmias or at baseline rhythm  Description: INTERVENTIONS:  - Continuous cardiac monitoring, vital signs, obtain 12 lead EKG if ordered  - Administer antiarrhythmic and heart rate control medications as ordered  - Monitor electrolytes and administer replacement therapy as ordered  Outcome: Progressing     Problem: METABOLIC, FLUID AND ELECTROLYTES - ADULT  Goal: Glucose maintained within target range  Description: INTERVENTIONS:  - Monitor Blood Glucose as ordered  - Assess for signs and symptoms of hyperglycemia and hypoglycemia  - Administer ordered medications to maintain glucose within target range  - Assess nutritional intake and initiate nutrition service referral as needed  Outcome: Progressing     Problem: MUSCULOSKELETAL - ADULT  Goal: Maintain or return mobility to safest level of function  Description: INTERVENTIONS:  - Assess patient's ability to carry out ADLs; assess patient's baseline for ADL function and identify physical deficits which impact ability to perform ADLs (bathing, care of mouth/teeth, toileting, grooming, dressing, etc.)  - Assess/evaluate cause of self-care deficits   - Assess range of motion  - Assess patient's mobility  - Assess patient's need for assistive devices and provide as appropriate  - Encourage maximum independence but intervene and supervise when necessary  - Involve family in performance of ADLs  - Assess for home care needs following discharge   - Consider OT consult to assist with ADL evaluation and planning for discharge  - Provide patient education as appropriate  Outcome: Progressing  Goal: Maintain proper alignment of affected body part  Description: INTERVENTIONS:  - Support, maintain and protect limb and body alignment  - Provide patient/ family with appropriate education  Outcome: Progressing

## 2023-08-07 ENCOUNTER — APPOINTMENT (OUTPATIENT)
Dept: NON INVASIVE DIAGNOSTICS | Facility: HOSPITAL | Age: 78
DRG: 309 | End: 2023-08-07
Payer: MEDICARE

## 2023-08-07 LAB
ATRIAL RATE: 0 BPM
ATRIAL RATE: 65 BPM
GLUCOSE SERPL-MCNC: 124 MG/DL (ref 65–140)
GLUCOSE SERPL-MCNC: 147 MG/DL (ref 65–140)
GLUCOSE SERPL-MCNC: 158 MG/DL (ref 65–140)
GLUCOSE SERPL-MCNC: 251 MG/DL (ref 65–140)
PR INTERVAL: 254 MS
QRS AXIS: 0 DEGREES
QRS AXIS: 56 DEGREES
QRSD INTERVAL: 0 MS
QRSD INTERVAL: 122 MS
QT INTERVAL: 0 MS
QT INTERVAL: 456 MS
QTC INTERVAL: 0 MS
QTC INTERVAL: 478 MS
T WAVE AXIS: -11 DEGREES
T WAVE AXIS: 0 DEGREES
VENTRICULAR RATE: 0 BPM
VENTRICULAR RATE: 66 BPM

## 2023-08-07 PROCEDURE — 93010 ELECTROCARDIOGRAM REPORT: CPT | Performed by: INTERNAL MEDICINE

## 2023-08-07 PROCEDURE — 92960 CARDIOVERSION ELECTRIC EXT: CPT

## 2023-08-07 PROCEDURE — NC001 PR NO CHARGE: Performed by: INTERNAL MEDICINE

## 2023-08-07 PROCEDURE — 93005 ELECTROCARDIOGRAM TRACING: CPT

## 2023-08-07 PROCEDURE — 99232 SBSQ HOSP IP/OBS MODERATE 35: CPT | Performed by: INTERNAL MEDICINE

## 2023-08-07 PROCEDURE — 93312 ECHO TRANSESOPHAGEAL: CPT

## 2023-08-07 PROCEDURE — 92960 CARDIOVERSION ELECTRIC EXT: CPT | Performed by: INTERNAL MEDICINE

## 2023-08-07 PROCEDURE — 5A2204Z RESTORATION OF CARDIAC RHYTHM, SINGLE: ICD-10-PCS | Performed by: INTERNAL MEDICINE

## 2023-08-07 PROCEDURE — 82948 REAGENT STRIP/BLOOD GLUCOSE: CPT

## 2023-08-07 RX ORDER — PROPOFOL 10 MG/ML
INJECTION, EMULSION INTRAVENOUS AS NEEDED
Status: DISCONTINUED | OUTPATIENT
Start: 2023-08-07 | End: 2023-08-07

## 2023-08-07 RX ORDER — DILTIAZEM HYDROCHLORIDE 120 MG/1
120 CAPSULE, COATED, EXTENDED RELEASE ORAL DAILY
Status: DISCONTINUED | OUTPATIENT
Start: 2023-08-08 | End: 2023-08-09 | Stop reason: HOSPADM

## 2023-08-07 RX ORDER — SODIUM CHLORIDE, SODIUM LACTATE, POTASSIUM CHLORIDE, CALCIUM CHLORIDE 600; 310; 30; 20 MG/100ML; MG/100ML; MG/100ML; MG/100ML
INJECTION, SOLUTION INTRAVENOUS CONTINUOUS PRN
Status: DISCONTINUED | OUTPATIENT
Start: 2023-08-07 | End: 2023-08-07

## 2023-08-07 RX ORDER — LIDOCAINE HYDROCHLORIDE 10 MG/ML
INJECTION, SOLUTION EPIDURAL; INFILTRATION; INTRACAUDAL; PERINEURAL AS NEEDED
Status: DISCONTINUED | OUTPATIENT
Start: 2023-08-07 | End: 2023-08-07

## 2023-08-07 RX ADMIN — EZETIMIBE 10 MG: 10 TABLET ORAL at 07:54

## 2023-08-07 RX ADMIN — DULOXETINE HYDROCHLORIDE 30 MG: 30 CAPSULE, DELAYED RELEASE ORAL at 07:51

## 2023-08-07 RX ADMIN — GABAPENTIN 300 MG: 300 CAPSULE ORAL at 20:31

## 2023-08-07 RX ADMIN — PROPOFOL 100 MG: 10 INJECTION, EMULSION INTRAVENOUS at 10:42

## 2023-08-07 RX ADMIN — DILTIAZEM HYDROCHLORIDE 30 MG: 30 TABLET, FILM COATED ORAL at 05:01

## 2023-08-07 RX ADMIN — SODIUM CHLORIDE, SODIUM LACTATE, POTASSIUM CHLORIDE, AND CALCIUM CHLORIDE: .6; .31; .03; .02 INJECTION, SOLUTION INTRAVENOUS at 10:09

## 2023-08-07 RX ADMIN — LOSARTAN POTASSIUM 50 MG: 50 TABLET, FILM COATED ORAL at 20:34

## 2023-08-07 RX ADMIN — Medication 2.5 MG: at 05:53

## 2023-08-07 RX ADMIN — DILTIAZEM HYDROCHLORIDE 30 MG: 30 TABLET, FILM COATED ORAL at 12:21

## 2023-08-07 RX ADMIN — FLECAINIDE ACETATE 100 MG: 50 TABLET ORAL at 20:34

## 2023-08-07 RX ADMIN — METOPROLOL SUCCINATE 100 MG: 100 TABLET, EXTENDED RELEASE ORAL at 17:17

## 2023-08-07 RX ADMIN — APIXABAN 5 MG: 5 TABLET, FILM COATED ORAL at 07:51

## 2023-08-07 RX ADMIN — LOSARTAN POTASSIUM 50 MG: 50 TABLET, FILM COATED ORAL at 07:52

## 2023-08-07 RX ADMIN — METOPROLOL SUCCINATE 100 MG: 100 TABLET, EXTENDED RELEASE ORAL at 07:53

## 2023-08-07 RX ADMIN — ACETAMINOPHEN 650 MG: 325 TABLET, FILM COATED ORAL at 17:17

## 2023-08-07 RX ADMIN — LIDOCAINE HYDROCHLORIDE 50 MG: 10 INJECTION, SOLUTION EPIDURAL; INFILTRATION; INTRACAUDAL; PERINEURAL at 10:42

## 2023-08-07 RX ADMIN — PROPOFOL 25 MG: 10 INJECTION, EMULSION INTRAVENOUS at 10:55

## 2023-08-07 RX ADMIN — FLECAINIDE ACETATE 100 MG: 50 TABLET ORAL at 07:52

## 2023-08-07 RX ADMIN — PROPOFOL 50 MG: 10 INJECTION, EMULSION INTRAVENOUS at 10:44

## 2023-08-07 RX ADMIN — ACETAMINOPHEN 650 MG: 325 TABLET, FILM COATED ORAL at 07:57

## 2023-08-07 RX ADMIN — APIXABAN 5 MG: 5 TABLET, FILM COATED ORAL at 20:31

## 2023-08-07 RX ADMIN — ZOLPIDEM TARTRATE 10 MG: 5 TABLET ORAL at 21:49

## 2023-08-07 RX ADMIN — FAMOTIDINE 20 MG: 20 TABLET ORAL at 07:53

## 2023-08-07 RX ADMIN — ROPINIROLE 0.5 MG: 0.25 TABLET, FILM COATED ORAL at 20:31

## 2023-08-07 RX ADMIN — PROPOFOL 50 MG: 10 INJECTION, EMULSION INTRAVENOUS at 10:48

## 2023-08-07 RX ADMIN — INSULIN LISPRO 1 UNITS: 100 INJECTION, SOLUTION INTRAVENOUS; SUBCUTANEOUS at 17:17

## 2023-08-07 RX ADMIN — PROPOFOL 50 MG: 10 INJECTION, EMULSION INTRAVENOUS at 10:52

## 2023-08-07 NOTE — ASSESSMENT & PLAN NOTE
Blood Pressure: 120/75  · Continue home meds amlodipine 5 mg once a day after dinner, losartan 50 milligrams twice daily  · Continue home dosage of Metoprolol at home, f/u with outpatient cardiology.

## 2023-08-07 NOTE — ASSESSMENT & PLAN NOTE
Wt Readings from Last 3 Encounters:   08/07/23 71.2 kg (156 lb 15.5 oz)   07/03/23 73.9 kg (163 lb)   06/26/23 73.1 kg (161 lb 2 oz)     Last echo June 2023 preop evaluation. LVEF 55%, G2DD  Pt not in CHF exacerbation t/o stay in hospital  Home regimen Lasix 20 mg as needed was recommended by Dr. Annie Armijo cardiology on the outpatient setting. Will follow up after discharge.

## 2023-08-07 NOTE — ANESTHESIA PREPROCEDURE EVALUATION
Procedure:  CARDIOVERSION    Relevant Problems   ANESTHESIA (within normal limits)      CARDIO   (+) Essential hypertension   (+) Hyperlipidemia   (+) Nonrheumatic aortic valve insufficiency   (+) Nonrheumatic mitral valve regurgitation      ENDO   (+) Type 2 diabetes mellitus without complication, without long-term current use of insulin (HCC)      /RENAL   (+) Stage 3b chronic kidney disease (HCC)      HEMATOLOGY   (+) Anemia      MUSCULOSKELETAL   (+) Arthritis of both acromioclavicular joints   (+) Fibromyalgia   (+) Lumbar spondylosis   (+) Osteoarthritis of knee   (+) Osteoarthritis of shoulder   (+) Osteoarthrosis, hand      NEURO/PSYCH   (+) Chronic tension-type headache, not intractable   (+) Continuous opioid dependence (HCC)   (+) Fibromyalgia      Other   (+) S/P placement of cardiac pacemaker        Cardiac EP device report 8/4/2023  MDT-DUAL CHAMBER PPM (AAIR-DDDR MODE)/ ACTIVE SYSTEM IS MRI CONDITIONAL   NB/ALERT-CARELINK TRANSMISSION: BATTERY STATUS "11 YRS." AP 0%  0%. ALL AVAILABLE LEAD PARAMETERS WITHIN NORMAL LIMITS. 27 VHRS & 3 FAST A/V NOTED. 3 AT/AF NOTED; 100% BURDEN. AVAIL EGRAMS PRESENT AS AFIB W/RVR RATES >140 BPM. PT HAS LEG SX EARLIER IN WEEK; ON METO SUCC & ELIQUIS. EF 55% (ECHO 2023). DR. OSWALD MADE AWARE. L/M FOR PT FOR ASSESSMENT HOWEVER NO C/B. NC       TTE 6/8/2023  •  Left Ventricle: Left ventricular cavity size is normal. Wall thickness is normal. The left ventricular ejection fraction is 55%. Systolic function is normal. Wall motion is normal. Diastolic function is moderately abnormal, consistent with grade II (pseudonormal) relaxation. •  Right Ventricle: Right ventricular cavity size is normal. Systolic function is normal. A pacer wire is present. •  Left Atrium: The atrium is mildly dilated. •  Aortic Valve: There is mild regurgitation. •  Mitral Valve: There is moderate regurgitation. •  Tricuspid Valve: There is mild to moderate regurgitation.  The right ventricular systolic pressure is moderately to severely elevated. The estimated right ventricular systolic pressure is 61.14 mmHg. •  Prior TTE study available for comparison. Prior study date: 1/4/2021. Changes noted when compared to prior study. Changes include: Mitral regurgitation has worsened. Domnick Grit Physical Exam    Airway    Mallampati score: III  TM Distance: <3 FB  Neck ROM: limited     Dental   Comment: Upper partial plate,     Cardiovascular      Pulmonary      Other Findings        Anesthesia Plan  ASA Score- 3     Anesthesia Type- IV sedation with anesthesia with ASA Monitors. Additional Monitors:   Airway Plan:           Plan Factors-Exercise tolerance (METS): >4 METS. Chart reviewed. EKG reviewed. Existing labs reviewed. Patient summary reviewed. Patient is not a current smoker.          Induction-     Postoperative Plan-     Informed Consent-

## 2023-08-07 NOTE — ASSESSMENT & PLAN NOTE
Recent Labs     08/04/23  1340 08/05/23  0600 08/06/23  0352   HGB 10.0* 11.1* 11.5       · Continue current treatment regimen.

## 2023-08-07 NOTE — PLAN OF CARE
Problem: Potential for Falls  Goal: Patient will remain free of falls  Description: INTERVENTIONS:  - Educate patient/family on patient safety including physical limitations  - Instruct patient to call for assistance with activity   - Consult OT/PT to assist with strengthening/mobility   - Keep Call bell within reach  - Keep bed low and locked with side rails adjusted as appropriate  - Keep care items and personal belongings within reach  - Initiate and maintain comfort rounds  - Make Fall Risk Sign visible to staff  - Offer Toileting every 2 Hours, in advance of need  - Initiate/Maintain bed/chair alarm  - Obtain necessary fall risk management equipment  - Apply yellow socks and bracelet for high fall risk patients  - Consider moving patient to room near nurses station  Outcome: Progressing     Problem: MOBILITY - ADULT  Goal: Maintain or return to baseline ADL function  Description: INTERVENTIONS:  -  Assess patient's ability to carry out ADLs; assess patient's baseline for ADL function and identify physical deficits which impact ability to perform ADLs (bathing, care of mouth/teeth, toileting, grooming, dressing, etc.)  - Assess/evaluate cause of self-care deficits   - Assess range of motion  - Assess patient's mobility; develop plan if impaired  - Assess patient's need for assistive devices and provide as appropriate  - Encourage maximum independence but intervene and supervise when necessary  - Involve family in performance of ADLs  - Assess for home care needs following discharge   - Consider OT consult to assist with ADL evaluation and planning for discharge  - Provide patient education as appropriate  Outcome: Progressing  Goal: Maintains/Returns to pre admission functional level  Description: INTERVENTIONS:  - Perform BMAT or MOVE assessment daily.   - Set and communicate daily mobility goal to care team and patient/family/caregiver.    - Collaborate with rehabilitation services on mobility goals if consulted  - Ambulate patient 3 times a day  - Out of bed to chair 2 times a day   - Out of bed for meals 2 times a day  - Out of bed for toileting  - Record patient progress and toleration of activity level   Outcome: Progressing     Problem: CARDIOVASCULAR - ADULT  Goal: Maintains optimal cardiac output and hemodynamic stability  Description: INTERVENTIONS:  - Monitor I/O, vital signs and rhythm  - Monitor for S/S and trends of decreased cardiac output  - Administer and titrate ordered vasoactive medications to optimize hemodynamic stability  - Assess quality of pulses, skin color and temperature  - Assess for signs of decreased coronary artery perfusion  - Instruct patient to report change in severity of symptoms  Outcome: Progressing  Goal: Absence of cardiac dysrhythmias or at baseline rhythm  Description: INTERVENTIONS:  - Continuous cardiac monitoring, vital signs, obtain 12 lead EKG if ordered  - Administer antiarrhythmic and heart rate control medications as ordered  - Monitor electrolytes and administer replacement therapy as ordered  Outcome: Progressing     Problem: METABOLIC, FLUID AND ELECTROLYTES - ADULT  Goal: Glucose maintained within target range  Description: INTERVENTIONS:  - Monitor Blood Glucose as ordered  - Assess for signs and symptoms of hyperglycemia and hypoglycemia  - Administer ordered medications to maintain glucose within target range  - Assess nutritional intake and initiate nutrition service referral as needed  Outcome: Progressing     Problem: MUSCULOSKELETAL - ADULT  Goal: Maintain or return mobility to safest level of function  Description: INTERVENTIONS:  - Assess patient's ability to carry out ADLs; assess patient's baseline for ADL function and identify physical deficits which impact ability to perform ADLs (bathing, care of mouth/teeth, toileting, grooming, dressing, etc.)  - Assess/evaluate cause of self-care deficits   - Assess range of motion  - Assess patient's mobility  - Assess patient's need for assistive devices and provide as appropriate  - Encourage maximum independence but intervene and supervise when necessary  - Involve family in performance of ADLs  - Assess for home care needs following discharge   - Consider OT consult to assist with ADL evaluation and planning for discharge  - Provide patient education as appropriate  Outcome: Progressing  Goal: Maintain proper alignment of affected body part  Description: INTERVENTIONS:  - Support, maintain and protect limb and body alignment  - Provide patient/ family with appropriate education  Outcome: Progressing

## 2023-08-07 NOTE — ASSESSMENT & PLAN NOTE
Lab Results   Component Value Date    HGBA1C 6.6 (H) 07/03/2023       Recent Labs     08/06/23  1046 08/06/23  1606 08/06/23 2032 08/07/23  0730   POCGLU 157* 122 298* 147*       Blood Sugar Average: Last 72 hrs:  Not on medication  · Continue carbohydrate controlled diet at home  · Continue home diabetic medication upon discharge.

## 2023-08-07 NOTE — PROGRESS NOTES
Progress Note - Cardiology   Mandy Mark 77 y.o. female MRN: [id number]  Unit/Bed#: S -01 Encounter: [id number]  08/07/23  9:34 AM    Impression and Plan:    13-year-old with history paroxysmal progression status post ablation March 2021, atrial fibrillation and flutter-August 2022, prior cardioversions as well,, sick sinus syndrome status post Medtronic dual-chamber pacemaker-March 2021, chronic heart failure with preserved ejection fraction-on furosemide 20 mg daily as needed at baseline,, hypertension, dyslipidemia, diabetes, presented with palpitations, after recent knee replacement, with atrial fibrillation confirmed on pacemaker interrogations as well. Tachycardic on arrival to the ED, CTA was negative for PE, given Cardizem and started on a drip. Started on flecainide attempting rhythm control strategy.     Plan:    Atrial fibrillation: History of atrial fibrillation and ablation  This episode might have been precipitated postoperatively from catecholamine surges  I performed SHANNAN-cardioversion today, status post external cardioversion into atrial paced rhythm  On Eliquis for anticoagulation-to continue without interruption for the next 4 weeks  Has been initiated on flecainide already this hospitalization, prior stress test-negative-October 2022 based on history, it appears that she might have been on flecainide as well as amiodarone in the past.  Climmie Du 2023 with EF 55%, moderate MR, mild to moderate TR, mild AI needs outpatient repeat echo to follow MR  TSH is normal  At baseline was on metoprolol 75 twice daily for AV jeremiah blockade   currently on metoprolol 100 twice daily and also on diltiazem 30 mg every 6 hours-can switch to 120 mg daily  Continue flecainide 100 mg twice daily-if no further atrial fibrillation on pacemaker interrogations, can consider discontinuing p.o. diltiazem as an outpatient    Status post permanent pacemaker implantation: Medtronic dual-chamber device, with recent interrogation showing atrial fibrillation    Chronic heart failure with preserved ejection fraction: At baseline on furosemide 20 mg as needed daily-currently not on  Euvolemic at this time, can continue furosemide 20 mg as needed    Hypertension: Controlled    Okay for discharge from a cardiac standpoint      ===================================================================    Chief Complaint:   Chief Complaint   Patient presents with   • Palpitations     Pt came home from surgery post knee replacement on Tuesday. Pt reports since to have started with chest palpitations and JOHNSON.  Pt called cardiologist and states with pacemaker confirmed pt is in AFIB and was advised to come to ED to be evaluated          Subjective/Objective     Subjective: Denies any complaints    Objective: No distress    Patient Active Problem List   Diagnosis   • Essential hypertension   • Allergic rhinitis   • Fibromyalgia   • Hyperlipidemia   • Insomnia   • Lumbar spondylosis   • Lumbar stenosis   • Osteoarthrosis, hand   • Osteoarthritis of knee   • Osteopenia   • A-fib with RVR ([facility])   • Peripheral neuropathy   • Restless legs syndrome   • TMJ syndrome   • Vitamin D deficiency   • Osteoarthritis of shoulder   • Carpal tunnel syndrome on right   • Arthritis of both acromioclavicular joints   • Chronic rhinitis   • Chronic diastolic CHF (congestive heart failure) ([facility])   • Malignant neoplasm of thyroid gland ([facility])   • Current use of long term anticoagulation   • S/P placement of cardiac pacemaker   • Tremors of nervous system   • Hx of diplopia   • Hurthle cell adenoma of thyroid   • MGUS (monoclonal gammopathy of unknown significance)   • Vasomotor rhinitis   • Chronic tension-type headache, not intractable   • Stage 3b chronic kidney disease ([address])   • Meningioma ([facility])   • Hyponatremia   • Pulmonary hypertension ([facility])   • Type 2 diabetes mellitus without complication, without long-term current use of insulin ([address])   • Continuous opioid dependence ([facility])   • Nonrheumatic mitral valve regurgitation   • Nonrheumatic aortic valve insufficiency   • Nonrheumatic tricuspid valve regurgitation   • Anemia   • Abnormal CT of the chest       Vitals: /75   Pulse 81   Temp 97.9 °F (36.6 °C)   Resp 18   Wt 71.2 kg (156 lb 15.5 oz)   LMP 02/01/1990 (Within Weeks)   SpO2 100%   BMI 29.66 kg/m²     No intake/output data recorded.   Wt Readings from Last 3 Encounters:   08/07/23 71.2 kg (156 lb 15.5 oz)   07/03/23 73.9 kg (163 lb)   06/26/23 73.1 kg (161 lb 2 oz)       Intake/Output Summary (Last 24 hours) at 8/7/2023 5742  Last data filed at 8/7/2023 0501  Gross per 24 hour   Intake --   Output 500 ml   Net -500 ml     I/O last 3 completed shifts:  In: -   Out: 1500 [Urine:1500]    Invasive Devices     Peripheral Intravenous Line  Duration           Peripheral IV 08/04/23 Left Antecubital 2 days                  Physical Exam:  GEN: Sandi Garcia appears okay, alert and oriented x 3, pleasant and cooperative   HEENT: pupils equal, round, and reactive to light; extraocular muscles intact  NECK: supple, no carotid bruits or JVD  HEART: irregular rhythm, normal S1 and S2, no murmur, no clicks, gallops or rubs   LUNGS: clear to auscultation bilaterally; no wheezes or rhonchi, no rales  ABDOMEN/GI: normal bowel sounds, soft, no tenderness, no distention  EXTREMITIES/Musculoskeltal: peripheral pulses normal; no clubbing, cyanosis, no edema  NEURO: no focal motor findings   SKIN: normal without suspicious lesions on exposed skin              Lab Results:       Results from last 7 days   Lab Units 08/06/23  0352 08/05/23  0600 08/04/23  1340   WBC Thousand/uL 8.15 6.16 7.61   HEMOGLOBIN g/dL 11.5 11.1* 10.0*   HEMATOCRIT % 34.9 33.4* 30.9*   PLATELETS Thousands/uL 322 279 278         Results from last 7 days   Lab Units 08/06/23  0352 08/05/23  0600 08/04/23  1340   POTASSIUM mmol/L 4.0 4.0 4.2   CHLORIDE mmol/L 99 101 98   CO2 mmol/L 26 27 26   BUN mg/dL 15 16 20   CREATININE mg/dL 0.66 0.71 0.79   CALCIUM mg/dL 9.4 8.9 9.0   ALK PHOS U/L  --   --  63   ALT U/L  --   --  13   AST U/L  --   --  17     Results from last 7 days   Lab Units 08/04/23  1340   INR  1.37*       Imaging: I have personally reviewed pertinent reports. EKG/Telemtry: No events    Scheduled Meds:  Current Facility-Administered Medications   Medication Dose Route Frequency Provider Last Rate   • acetaminophen  650 mg Oral Q6H PRN Ana Maria Ya MD     • apixaban  5 mg Oral BID Ana Maria Ya MD     • diltiazem  30 mg Oral Q6H [address] [place] PA-C     • DULoxetine  30 mg Oral Daily Ana Maria Ya MD     • ezetimibe  10 mg Oral Daily Ana Maria Ya MD     • famotidine  20 mg Oral Every Other Day Ana Maria Ya MD     • flecainide  100 mg Oral Q12H Cher Delaney MD     • gabapentin  300 mg Oral HS Ana Maria Ya MD     • insulin lispro  1-5 Units Subcutaneous TID [address] MD     • losartan  50 mg Oral BID Ana Maria Ya MD     • metoprolol succinate  100 mg Oral BID [place] Porter PA-C     • oxyCODONE  5 mg Oral Q4H PRN Ana Maria Ya MD     • oxyCODONE  2.5 mg Oral Q6H PRN Ana Maria Ya MD     • polyethylene glycol  17 g Oral Daily PRN Ana Maria Ya MD     • rOPINIRole  0.5 mg Oral HS Ana Maria Ya MD     • senna  1 tablet Oral BID Ana Maria Ya MD     • zolpidem  10 mg Oral HS PRN Ana Maria Ya MD       Continuous Infusions:       VTE Pharmacologic Prophylaxis: Apixaban  VTE Mechanical Prophylaxis: sequential compression device    This note was completed in part utilizing [employer] fluency direct voice recognition software.    Grammatical errors, random word insertion, spelling mistakes, occasional wrong word or "sound-alike" substitutions and incomplete sentences may be an occasional consequence of the system secondary to software limitations, ambient noise and hardware issues. At the time of dictation, efforts were made to edit, clarify and /or correct errors. Please read the chart carefully and recognize, using context, where substitutions have occurred. If you have any questions or concerns about the context, text or information contained within the body of this dictation, please contact myself, the provider, for further clarification.

## 2023-08-07 NOTE — PLAN OF CARE
Problem: Potential for Falls  Goal: Patient will remain free of falls  Description: INTERVENTIONS:  - Educate patient/family on patient safety including physical limitations  - Instruct patient to call for assistance with activity   - Consult OT/PT to assist with strengthening/mobility   - Keep Call bell within reach  - Keep bed low and locked with side rails adjusted as appropriate  - Keep care items and personal belongings within reach  - Initiate and maintain comfort rounds  - Make Fall Risk Sign visible to staff  - Offer Toileting every 2 Hours, in advance of need  - Initiate/Maintain bed alarm  - Obtain necessary fall risk management equipment: bed alarm  - Apply yellow socks and bracelet for high fall risk patients  - Consider moving patient to room near nurses station  Outcome: Progressing     Problem: MOBILITY - ADULT  Goal: Maintain or return to baseline ADL function  Description: INTERVENTIONS:  -  Assess patient's ability to carry out ADLs; assess patient's baseline for ADL function and identify physical deficits which impact ability to perform ADLs (bathing, care of mouth/teeth, toileting, grooming, dressing, etc.)  - Assess/evaluate cause of self-care deficits   - Assess range of motion  - Assess patient's mobility; develop plan if impaired  - Assess patient's need for assistive devices and provide as appropriate  - Encourage maximum independence but intervene and supervise when necessary  - Involve family in performance of ADLs  - Assess for home care needs following discharge   - Consider OT consult to assist with ADL evaluation and planning for discharge  - Provide patient education as appropriate  Outcome: Progressing  Goal: Maintains/Returns to pre admission functional level  Description: INTERVENTIONS:  - Perform BMAT or MOVE assessment daily.   - Set and communicate daily mobility goal to care team and patient/family/caregiver.    - Collaborate with rehabilitation services on mobility goals if consulted  - Perform Range of Motion 2 times a day. - Reposition patient every 2 hours.   - Dangle patient 2 times a day  - Stand patient 2 times a day  - Ambulate patient 2 times a day  - Out of bed to chair 2 times a day   - Out of bed for meals 2 times a day  - Out of bed for toileting  - Record patient progress and toleration of activity level   Outcome: Progressing     Problem: CARDIOVASCULAR - ADULT  Goal: Maintains optimal cardiac output and hemodynamic stability  Description: INTERVENTIONS:  - Monitor I/O, vital signs and rhythm  - Monitor for S/S and trends of decreased cardiac output  - Administer and titrate ordered vasoactive medications to optimize hemodynamic stability  - Assess quality of pulses, skin color and temperature  - Assess for signs of decreased coronary artery perfusion  - Instruct patient to report change in severity of symptoms  Outcome: Progressing  Goal: Absence of cardiac dysrhythmias or at baseline rhythm  Description: INTERVENTIONS:  - Continuous cardiac monitoring, vital signs, obtain 12 lead EKG if ordered  - Administer antiarrhythmic and heart rate control medications as ordered  - Monitor electrolytes and administer replacement therapy as ordered  Outcome: Progressing

## 2023-08-07 NOTE — ASSESSMENT & PLAN NOTE
Lab Results   Component Value Date    EGFR 84 08/06/2023    EGFR 81 08/05/2023    EGFR 71 08/04/2023    CREATININE 0.66 08/06/2023    CREATININE 0.71 08/05/2023    CREATININE 0.79 08/04/2023     Continue current home treatment regimen.

## 2023-08-07 NOTE — DISCHARGE SUMMARY
[address] [facility]  Discharge- Bullitt Mood 1945, 77 y.o. female MRN: [id number]  Unit/Bed#: S -01 Encounter: [id number]  Primary Care Provider: Alexi Banks MD   Date and time admitted to hospital: 8/4/2023  1:07 PM    * A-fib with RVR [facility])  Assessment & Plan  8/5/2023 patient continues to go in and out of afib with RVR, denies palpitations or anxiety  · Subsequently started on Cardizem drip, cardiology consulted and added flecainide  · Weaned off Cardizem gtt as of 0930 08/06/2023  · [id number] 6  · Likely in the setting postoperative trigger with somewhat superimposed uncontrolled pain  · Flecainide and Cardizem did not resolve pt's afib, cardioversion procedure was done and successful (8/7/23)    Plan  · As per cards, metoprolol 100mg, cardizem 30mg PO q6H, flecainide 100 mg q12H  · Continue monitoring on telemetry  · SHANNAN cardioversion successful, will follow up outpatient. · Continue taking medications given at discharge until outpatient follow up with cardiology. Chronic diastolic CHF (congestive heart failure) (HCC)  Assessment & Plan  Wt Readings from Last 3 Encounters:   08/09/23 71.4 kg (157 lb 6.5 oz)   07/03/23 73.9 kg (163 lb)   06/26/23 73.1 kg (161 lb 2 oz)     Last echo June 2023 preop evaluation. LVEF 55%, G2DD  Pt not in CHF exacerbation t/o stay in hospital  Home regimen Lasix 20 mg as needed was recommended by Dr. Lisa Nixon cardiology on the outpatient setting. Will follow up after discharge. Essential hypertension  Assessment & Plan  Blood Pressure: 156/76  · Continue home meds amlodipine 5 mg once a day after dinner, losartan 50 milligrams twice daily  · Continue home dosage of Metoprolol at home, f/u with outpatient cardiology. Abnormal CT of the chest  Assessment & Plan  · 08/04/2023 CTA PE "Mildly heterogeneous liver with slight lobulation along the left lobe.  Early cirrhosis is not excluded and follow-up with ultrasound is recommended. There is a 5 mm left lower lobe nodule on series 304 image 192, stable from 2021. There is no tracheal or endobronchial lesion. Incidental discovery of one or more thyroid nodule(s) measuring more than 1.5 cm and without suspicious features "  · Pending RUQ results  · Recommend follow-up with primary care provider for nonurgent thyroid ultrasound    Anemia  Assessment & Plan  Recent Labs     08/08/23  0532 08/08/23  1616   HGB 9.5* 10.1*       · Continue current treatment regimen. Type 2 diabetes mellitus without complication, without long-term current use of insulin [facility])  Assessment & Plan  Lab Results   Component Value Date    HGBA1C 6.6 (H) 07/03/2023       Recent Labs     08/08/23  1618 08/08/23  2140 08/09/23  0743 08/09/23  1119   POCGLU 135 148* 154* 204*       Blood Sugar Average: Last 72 hrs:  Not on medication  · Continue carbohydrate controlled diet at home  · Continue home diabetic medication upon discharge. Hyponatremia  Assessment & Plan  Recent Labs     08/08/23  0532 08/08/23  1616 08/09/23  1048   SODIUM 130* 131* 127*       · Continue home treatment regimen. Stage 3b chronic kidney disease [facility])  Assessment & Plan  Lab Results   Component Value Date    EGFR 76 08/09/2023    EGFR 74 08/08/2023    EGFR 69 08/08/2023    CREATININE 0.75 08/09/2023    CREATININE 0.76 08/08/2023    CREATININE 0.81 08/08/2023     Continue current home treatment regimen.        Medical Problems     Resolved Problems  Date Reviewed: 8/6/2023   None       Discharging Resident: Erica Gonzalez MD  Discharging Attending: Lola Ji MD  PCP: Blank Curry MD  Admission Date:   Admission Orders (From admission, onward)     Ordered        08/04/23 1721  INPATIENT ADMISSION  Once                      Discharge Date: 08/09/23    Consultations During Hospital Stay:  · Cardiology    Procedures Performed:   · Cardioversion    Significant Findings / Test Results:   EKG: Abnormal ECG- Atrial fibrillation with occasional ventricular-paced complexes and with premature ventricular or aberrantly conducted complexes  Nonspecific ST and T wave abnormality    Incidental Findings:   RUQ ultrasound: 6 mm gallbladder polyp incidentally noted  CTA chest: Incidental 1.5 cm thyroid nodule(s) for which nonemergent thyroid. · I reviewed the above mentioned incidental findings with the patient and/or family and they expressed understanding. Test Results Pending at Discharge (will require follow up):  · none     Outpatient Tests Requested:  · None- follow up with Cardiology    Complications:  none    Reason for Admission: afib with [facility] Course:   Sandi Garcia is a 77 y.o. female patient who originally presented to the hospital on 8/4/2023 due to afib with RvR. She has a PMH of paroxysmal A-fib on Eliquis, atrial pacemaker, hypertension, HFpEF, fibromyalgia, thyroid cancer status post left lobectomy 2020, osteoarthritis, who is currently status post left knee replacement revision on 07/31/23. She had a 2-day history fatigue, exertional dyspnea with associated shortness of breath and palpitations which which led her to go to the ED. Patient endorsed that has been compliant with her medications even during pre and postop. In the ED, she found that her pacemaker lead was interrogated and was on A-fib with RVR. She was admitted and was initially treated with a Cardizem drip, maintained on Metoprolol and Eliquis. She was then switched to Flecainide as her Cardizem was weaned off and her afib continued to persist.  She received cardioversion which was successful and advised to follow up with cardiology as outpatient upon discharge. Pt's sodium was noted to be chronically below 135. Rather than pursuing additional hospital workup pt opted to f/u with PCP due to her feeling at baseline. On day of discharge Arielle Nayak was deemed stable for discharge.     Condition at Discharge: stable    Discharge Day Visit / Exam:   Subjective:  Pt in no apparent distress, no overnight events noted. Pt in good spirits and awaiting discharge. With a red papular rash on BL shins which was itchy around her ankles overnight. All questions answered. Vitals: Blood Pressure: 156/76 (08/09/23 0745)  Pulse: 76 (08/09/23 0745)  Temperature: 97.8 °F (36.6 °C) (08/08/23 1545)  Temp Source: Oral (08/07/23 2034)  Respirations: 18 (08/08/23 1545)  Height: 5' 1" (154.9 cm) (08/07/23 1040)  Weight - Scale: 71.4 kg (157 lb 6.5 oz) (08/09/23 0600)  SpO2: 97 % (08/09/23 0745)  Exam:   Physical Exam  Vitals and nursing note reviewed. Constitutional:       General: She is not in acute distress. Appearance: She is well-developed. HENT:      Head: Normocephalic and atraumatic. Eyes:      Conjunctiva/sclera: Conjunctivae normal.   Cardiovascular:      Rate and Rhythm: Normal rate and regular rhythm. Heart sounds: No murmur heard. Pulmonary:      Effort: Pulmonary effort is normal. No respiratory distress. Breath sounds: Normal breath sounds. Abdominal:      Palpations: Abdomen is soft. Tenderness: There is no abdominal tenderness. Musculoskeletal:         General: No swelling. Cervical back: Neck supple. Skin:     General: Skin is warm and dry. Capillary Refill: Capillary refill takes less than 2 seconds. Findings: Rash (scattered red papules about 4mm across on BL shins without surrounding erythema and without tenderness) present. Neurological:      Mental Status: She is alert. Psychiatric:         Mood and Affect: Mood normal.         Discussion with Family: Updated  (daughter) at bedside. Discharge instructions/Information to patient and family:   See after visit summary for information provided to patient and family. Provisions for Follow-Up Care:  See after visit summary for information related to follow-up care and any pertinent home health orders.        Disposition: Home    Planned Readmission: no    Discharge Medications:  See after visit summary for reconciled discharge medications provided to patient and/or family.       **Please Note: This note may have been constructed using a voice recognition system**

## 2023-08-07 NOTE — ANESTHESIA POSTPROCEDURE EVALUATION
Post-Op Assessment Note    CV Status:  Stable  Pain Score: 0    Pain management: adequate     Mental Status:  Sleepy   Hydration Status:  Euvolemic   PONV Controlled:  Controlled   Airway Patency:  Patent      Post Op Vitals Reviewed: Yes      Staff: CRNA   Comments: vss sv nonobstructed uneventful        No notable events documented.     BP (!) 98/49 (08/07/23 1106)    Temp (!) 96.8 °F (36 °C) (08/07/23 1106)    Pulse 63 (08/07/23 1106)   Resp 16 (08/07/23 1106)    SpO2 96 % (08/07/23 1106)

## 2023-08-07 NOTE — ASSESSMENT & PLAN NOTE
8/5/2023 patient continues to go in and out of afib with RVR, denies palpitations or anxiety  · Subsequently started on Cardizem drip, cardiology consulted and added flecainide  · Weaned off Cardizem gtt as of 0930 08/06/2023  · [id number] 6  · Likely in the setting postoperative trigger with somewhat superimposed uncontrolled pain    Plan  · As per cards, metoprolol 100mg, cardizem 30mg PO q6H, flecainide 100 mg q12H  · Continue monitoring on telemetry  · SHANNAN cardioversion successful, will follow up outpatient. · Continue taking medications given at discharge until outpatient follow up with cardiology.

## 2023-08-07 NOTE — ASSESSMENT & PLAN NOTE
Recent Labs     08/04/23  1340 08/05/23  0600 08/06/23  0352   SODIUM 132* 136 133*       · Continue home treatment regimen.

## 2023-08-07 NOTE — PROGRESS NOTES
[address] [facility]  Progress Note  Name: Dori Scott  MRN: [id number]  Unit/Bed#: S -01 I Date of Admission: 8/4/2023   Date of Service: 8/7/2023 I Hospital Day: 3    Assessment/Plan   * A-fib with RVR [facility])  Assessment & Plan  8/5/2023 patient continues to go in and out of afib with RVR, denies palpitations or anxiety  · Subsequently started on Cardizem drip, cardiology consulted and added flecainide  · Weaned off Cardizem gtt as of 0930 08/06/2023  · [id number] 6  · Likely in the setting postoperative trigger with somewhat superimposed uncontrolled pain    Plan  · As per cards, metoprolol 100mg, cardizem 30mg PO q6H, flecainide 100 mg q12H  · Continue monitoring on telemetry  · SHANNAN cardioversion successful, will follow up outpatient. · Continue taking medications given at discharge until outpatient follow up with cardiology. Abnormal CT of the chest  Assessment & Plan  · 08/04/2023 CTA PE "Mildly heterogeneous liver with slight lobulation along the left lobe. Early cirrhosis is not excluded and follow-up with ultrasound is recommended. There is a 5 mm left lower lobe nodule on series 304 image 192, stable from 2021. There is no tracheal or endobronchial lesion. Incidental discovery of one or more thyroid nodule(s) measuring more than 1.5 cm and without suspicious features "  · Pending RUQ results  · Recommend follow-up with primary care provider for nonurgent thyroid ultrasound    Anemia  Assessment & Plan  Recent Labs     08/04/23  1340 08/05/23  0600 08/06/23  0352   HGB 10.0* 11.1* 11.5       · Continue current treatment regimen.     Type 2 diabetes mellitus without complication, without long-term current use of insulin [facility])  Assessment & Plan  Lab Results   Component Value Date    HGBA1C 6.6 (H) 07/03/2023       Recent Labs     08/06/23  1046 08/06/23  1606 08/06/23  2032 08/07/23  0730   POCGLU 157* 122 298* 147*       Blood Sugar Average: Last 72 hrs:  Not on medication  · Continue carbohydrate controlled diet at home  · Continue home diabetic medication upon discharge. Hyponatremia  Assessment & Plan  Recent Labs     08/04/23  1340 08/05/23  0600 08/06/23  0352   SODIUM 132* 136 133*       · Continue home treatment regimen. Stage 3b chronic kidney disease [facility])  Assessment & Plan  Lab Results   Component Value Date    EGFR 84 08/06/2023    EGFR 81 08/05/2023    EGFR 71 08/04/2023    CREATININE 0.66 08/06/2023    CREATININE 0.71 08/05/2023    CREATININE 0.79 08/04/2023     Continue current home treatment regimen. Chronic diastolic CHF (congestive heart failure) (HCC)  Assessment & Plan  Wt Readings from Last 3 Encounters:   08/07/23 71.2 kg (156 lb 15.5 oz)   07/03/23 73.9 kg (163 lb)   06/26/23 73.1 kg (161 lb 2 oz)     Last echo June 2023 preop evaluation. LVEF 55%, G2DD  Pt not in CHF exacerbation t/o stay in hospital  Home regimen Lasix 20 mg as needed was recommended by Dr. Sharona Hernandez cardiology on the outpatient setting. Will follow up after discharge. Essential hypertension  Assessment & Plan  Blood Pressure: 120/75  · Continue home meds amlodipine 5 mg once a day after dinner, losartan 50 milligrams twice daily  · Continue home dosage of Metoprolol at home, f/u with outpatient cardiology. VTE Pharmacologic Prophylaxis: VTE Score: 10 High Risk (Score >/= 5) - Pharmacological DVT Prophylaxis Ordered: apixaban (Eliquis). Sequential Compression Devices Ordered. Patient Centered Rounds: I performed bedside rounds with nursing staff today. Discussions with Specialists or Other Care Team Provider: Dr. Kang Wren    Education and Discussions with Family / Patient: Updated  (daughter) at bedside. Current Length of Stay: 3 day(s)  Current Patient Status: Inpatient   Discharge Plan: Anticipate discharge tomorrow to rehab facility.     Code Status: Level 3 - DNAR and DNI    Subjective:   Pt with no overnight events, in no acute distress at this time. Reports no new complaints or symptoms at this time and is eager for discharge. Objective:     Vitals:   Temp (24hrs), Av.6 °F (36.4 °C), Min:96.8 °F (36 °C), Max:98.4 °F (36.9 °C)    Temp:  [96.8 °F (36 °C)-98.4 °F (36.9 °C)] 98.1 °F (36.7 °C)  HR:  [] 69  Resp:  [16-18] 16  BP: ()/(49-77) 131/77  SpO2:  [95 %-100 %] 97 %  Body mass index is 29.66 kg/m². Input and Output Summary (last 24 hours): Intake/Output Summary (Last 24 hours) at 2023 1547  Last data filed at 2023 1102  Gross per 24 hour   Intake 300 ml   Output 500 ml   Net -200 ml       Physical Exam:   Physical Exam  Vitals and nursing note reviewed. Constitutional:       General: She is not in acute distress. Appearance: She is well-developed. HENT:      Head: Normocephalic and atraumatic. Eyes:      Conjunctiva/sclera: Conjunctivae normal.   Cardiovascular:      Rate and Rhythm: Normal rate and regular rhythm. Pulses: Normal pulses. Heart sounds: Normal heart sounds. No murmur heard. No friction rub. No gallop. Pulmonary:      Effort: Pulmonary effort is normal. No respiratory distress. Breath sounds: Normal breath sounds. No wheezing or rales. Abdominal:      Palpations: Abdomen is soft. Tenderness: There is no abdominal tenderness. Musculoskeletal:         General: No swelling. Cervical back: Neck supple. Skin:     General: Skin is warm and dry. Capillary Refill: Capillary refill takes less than 2 seconds. Neurological:      Mental Status: She is alert.    Psychiatric:         Mood and Affect: Mood normal.       Additional Data:     Labs:  Results from last 7 days   Lab Units 23  0352 23  0600 23  1340   WBC Thousand/uL 8.15   < > 7.61   HEMOGLOBIN g/dL 11.5   < > 10.0*   HEMATOCRIT % 34.9   < > 30.9*   PLATELETS Thousands/uL 322   < > 278   NEUTROS PCT %  --   --  62   LYMPHS PCT %  --   --  23   MONOS PCT %  --   --  11 EOS PCT %  --   --  3    < > = values in this interval not displayed. Results from last 7 days   Lab Units 23  0352 23  0600 23  1340   SODIUM mmol/L 133*   < > 132*   POTASSIUM mmol/L 4.0   < > 4.2   CHLORIDE mmol/L 99   < > 98   CO2 mmol/L 26   < > 26   BUN mg/dL 15   < > 20   CREATININE mg/dL 0.66   < > 0.79   ANION GAP mmol/L 8   < > 8   CALCIUM mg/dL 9.4   < > 9.0   ALBUMIN g/dL  --   --  3.7   TOTAL BILIRUBIN mg/dL  --   --  1.07*   ALK PHOS U/L  --   --  63   ALT U/L  --   --  13   AST U/L  --   --  17   GLUCOSE RANDOM mg/dL 141*   < > 159*    < > = values in this interval not displayed. Results from last 7 days   Lab Units 23  1340   INR  1.37*     Results from last 7 days   Lab Units 23  1221 23  0730 23  2032 23  1606 23  1046 23  0725 23  2330 23  1541 23  1207 23  0819 23  2121 23  1945   POC GLUCOSE mg/dl 124 147* 298* 122 157* 151* 135 207* 134 150* 246* 183*               Lines/Drains:  Invasive Devices     Peripheral Intravenous Line  Duration           Peripheral IV 23 Left Antecubital 3 days                  Telemetry:  Telemetry Orders (From admission, onward)             24 Hour Telemetry Monitoring  Continuous x 24 Hours (Telem)           Question:  Reason for 24 Hour Telemetry  Answer:  Decompensated CHF- and any one of the following: continuous diuretic infusion or total diuretic dose >200 mg daily, associated electrolyte derangement (I.e. K < 3.0), ionotropic drip (continuous infusion), hx of ventricular arrhythmia, or new EF < 35%                        Imaging:     CTA chest  No acute pulmonary embolism. Mildly heterogeneous liver with slight lobulation along the left lobe. Early cirrhosis is not excluded and follow-up with ultrasound is recommended.    Incidental thyroid nodule(s) for which nonemergent thyroid.      CXR: 2023  No acute cardiopulmonary disease    Recent Cultures (last 7 days):         Last 24 Hours Medication List:   Current Facility-Administered Medications   Medication Dose Route Frequency Provider Last Rate   • acetaminophen  650 mg Oral Q6H PRN Irais Olivares MD     • apixaban  5 mg Oral BID Irais Olivares MD     • [START ON 8/8/2023] diltiazem  120 mg Oral Daily Ros Soares MD     • DULoxetine  30 mg Oral Daily Irais Olivares MD     • ezetimibe  10 mg Oral Daily Irais Olivares MD     • famotidine  20 mg Oral Every Other Day Irais Olivares MD     • flecainide  100 mg Oral Q12H [address] Armida Rowe MD     • gabapentin  300 mg Oral HS Irais Olivares MD     • insulin lispro  1-5 Units Subcutaneous TID [address] MD     • losartan  50 mg Oral BID Irais Olivares MD     • metoprolol succinate  100 mg Oral BID Kelly Barfield PA-C     • oxyCODONE  5 mg Oral Q4H PRN Irais Olivares MD     • oxyCODONE  2.5 mg Oral Q6H PRN Irais Olivares MD     • polyethylene glycol  17 g Oral Daily PRN Irais Olivares MD     • rOPINIRole  0.5 mg Oral HS Irais Olivares MD     • senna  1 tablet Oral BID Irais Olivares MD     • zolpidem  10 mg Oral HS PRN Irais Olivares MD          Today, Patient Was Seen By: Aaron Kuhn MD    **Please Note: This note may have been constructed using a voice recognition system. **

## 2023-08-08 ENCOUNTER — TELEPHONE (OUTPATIENT)
Dept: FAMILY MEDICINE CLINIC | Facility: CLINIC | Age: 78
End: 2023-08-08

## 2023-08-08 ENCOUNTER — TELEPHONE (OUTPATIENT)
Dept: CARDIOLOGY CLINIC | Facility: CLINIC | Age: 78
End: 2023-08-08

## 2023-08-08 LAB
ALBUMIN SERPL BCP-MCNC: 3.7 G/DL (ref 3.5–5)
ALP SERPL-CCNC: 60 U/L (ref 34–104)
ALT SERPL W P-5'-P-CCNC: 14 U/L (ref 7–52)
ANION GAP SERPL CALCULATED.3IONS-SCNC: 6 MMOL/L
ANION GAP SERPL CALCULATED.3IONS-SCNC: 8 MMOL/L
AST SERPL W P-5'-P-CCNC: 14 U/L (ref 13–39)
BASOPHILS # BLD AUTO: 0.06 THOUSANDS/ÂΜL (ref 0–0.1)
BASOPHILS NFR BLD AUTO: 1 % (ref 0–1)
BILIRUB SERPL-MCNC: 1.1 MG/DL (ref 0.2–1)
BUN SERPL-MCNC: 23 MG/DL (ref 5–25)
BUN SERPL-MCNC: 23 MG/DL (ref 5–25)
CALCIUM SERPL-MCNC: 9 MG/DL (ref 8.4–10.2)
CALCIUM SERPL-MCNC: 9.5 MG/DL (ref 8.4–10.2)
CHLORIDE SERPL-SCNC: 97 MMOL/L (ref 96–108)
CHLORIDE SERPL-SCNC: 99 MMOL/L (ref 96–108)
CO2 SERPL-SCNC: 23 MMOL/L (ref 21–32)
CO2 SERPL-SCNC: 28 MMOL/L (ref 21–32)
CREAT SERPL-MCNC: 0.76 MG/DL (ref 0.6–1.3)
CREAT SERPL-MCNC: 0.81 MG/DL (ref 0.6–1.3)
EOSINOPHIL # BLD AUTO: 0.25 THOUSAND/ÂΜL (ref 0–0.61)
EOSINOPHIL NFR BLD AUTO: 3 % (ref 0–6)
ERYTHROCYTE [DISTWIDTH] IN BLOOD BY AUTOMATED COUNT: 13.1 % (ref 11.6–15.1)
ERYTHROCYTE [DISTWIDTH] IN BLOOD BY AUTOMATED COUNT: 13.2 % (ref 11.6–15.1)
GFR SERPL CREATININE-BSD FRML MDRD: 69 ML/MIN/1.73SQ M
GFR SERPL CREATININE-BSD FRML MDRD: 74 ML/MIN/1.73SQ M
GLUCOSE SERPL-MCNC: 135 MG/DL (ref 65–140)
GLUCOSE SERPL-MCNC: 135 MG/DL (ref 65–140)
GLUCOSE SERPL-MCNC: 138 MG/DL (ref 65–140)
GLUCOSE SERPL-MCNC: 141 MG/DL (ref 65–140)
GLUCOSE SERPL-MCNC: 148 MG/DL (ref 65–140)
GLUCOSE SERPL-MCNC: 176 MG/DL (ref 65–140)
HCT VFR BLD AUTO: 28.6 % (ref 34.8–46.1)
HCT VFR BLD AUTO: 30.2 % (ref 34.8–46.1)
HGB BLD-MCNC: 10.1 G/DL (ref 11.5–15.4)
HGB BLD-MCNC: 9.5 G/DL (ref 11.5–15.4)
IMM GRANULOCYTES # BLD AUTO: 0.04 THOUSAND/UL (ref 0–0.2)
IMM GRANULOCYTES NFR BLD AUTO: 1 % (ref 0–2)
LYMPHOCYTES # BLD AUTO: 1.32 THOUSANDS/ÂΜL (ref 0.6–4.47)
LYMPHOCYTES NFR BLD AUTO: 17 % (ref 14–44)
MCH RBC QN AUTO: 31.1 PG (ref 26.8–34.3)
MCH RBC QN AUTO: 31.6 PG (ref 26.8–34.3)
MCHC RBC AUTO-ENTMCNC: 33.2 G/DL (ref 31.4–37.4)
MCHC RBC AUTO-ENTMCNC: 33.4 G/DL (ref 31.4–37.4)
MCV RBC AUTO: 94 FL (ref 82–98)
MCV RBC AUTO: 94 FL (ref 82–98)
MONOCYTES # BLD AUTO: 1.11 THOUSAND/ÂΜL (ref 0.17–1.22)
MONOCYTES NFR BLD AUTO: 14 % (ref 4–12)
NEUTROPHILS # BLD AUTO: 5.04 THOUSANDS/ÂΜL (ref 1.85–7.62)
NEUTS SEG NFR BLD AUTO: 64 % (ref 43–75)
NRBC BLD AUTO-RTO: 0 /100 WBCS
PLATELET # BLD AUTO: 346 THOUSANDS/UL (ref 149–390)
PLATELET # BLD AUTO: 350 THOUSANDS/UL (ref 149–390)
PMV BLD AUTO: 9.7 FL (ref 8.9–12.7)
PMV BLD AUTO: 9.8 FL (ref 8.9–12.7)
POTASSIUM SERPL-SCNC: 4.1 MMOL/L (ref 3.5–5.3)
POTASSIUM SERPL-SCNC: 4.2 MMOL/L (ref 3.5–5.3)
PROT SERPL-MCNC: 6.5 G/DL (ref 6.4–8.4)
RBC # BLD AUTO: 3.05 MILLION/UL (ref 3.81–5.12)
RBC # BLD AUTO: 3.2 MILLION/UL (ref 3.81–5.12)
SL CV LV EF: 65
SODIUM SERPL-SCNC: 130 MMOL/L (ref 135–147)
SODIUM SERPL-SCNC: 131 MMOL/L (ref 135–147)
WBC # BLD AUTO: 7.4 THOUSAND/UL (ref 4.31–10.16)
WBC # BLD AUTO: 7.82 THOUSAND/UL (ref 4.31–10.16)

## 2023-08-08 PROCEDURE — 93320 DOPPLER ECHO COMPLETE: CPT | Performed by: INTERNAL MEDICINE

## 2023-08-08 PROCEDURE — 99233 SBSQ HOSP IP/OBS HIGH 50: CPT | Performed by: INTERNAL MEDICINE

## 2023-08-08 PROCEDURE — 85025 COMPLETE CBC W/AUTO DIFF WBC: CPT

## 2023-08-08 PROCEDURE — 82948 REAGENT STRIP/BLOOD GLUCOSE: CPT

## 2023-08-08 PROCEDURE — 93325 DOPPLER ECHO COLOR FLOW MAPG: CPT | Performed by: INTERNAL MEDICINE

## 2023-08-08 PROCEDURE — 80053 COMPREHEN METABOLIC PANEL: CPT

## 2023-08-08 PROCEDURE — 85027 COMPLETE CBC AUTOMATED: CPT

## 2023-08-08 PROCEDURE — 97163 PT EVAL HIGH COMPLEX 45 MIN: CPT

## 2023-08-08 PROCEDURE — 93312 ECHO TRANSESOPHAGEAL: CPT | Performed by: INTERNAL MEDICINE

## 2023-08-08 PROCEDURE — 97165 OT EVAL LOW COMPLEX 30 MIN: CPT

## 2023-08-08 PROCEDURE — 80048 BASIC METABOLIC PNL TOTAL CA: CPT

## 2023-08-08 RX ORDER — DILTIAZEM HYDROCHLORIDE 120 MG/1
120 CAPSULE, COATED, EXTENDED RELEASE ORAL DAILY
Qty: 30 CAPSULE | Refills: 0 | Status: SHIPPED | OUTPATIENT
Start: 2023-08-08

## 2023-08-08 RX ORDER — FLECAINIDE ACETATE 100 MG/1
100 TABLET ORAL 2 TIMES DAILY
Qty: 60 TABLET | Refills: 0 | Status: SHIPPED | OUTPATIENT
Start: 2023-08-08

## 2023-08-08 RX ORDER — METOPROLOL SUCCINATE 25 MG/1
100 TABLET, EXTENDED RELEASE ORAL EVERY 12 HOURS SCHEDULED
Qty: 240 TABLET | Refills: 1 | Status: SHIPPED | OUTPATIENT
Start: 2023-08-08 | End: 2023-10-07

## 2023-08-08 RX ORDER — DIAPER,BRIEF,INFANT-TODD,DISP
EACH MISCELLANEOUS 4 TIMES DAILY PRN
Status: DISCONTINUED | OUTPATIENT
Start: 2023-08-08 | End: 2023-08-09 | Stop reason: HOSPADM

## 2023-08-08 RX ADMIN — INSULIN LISPRO 1 UNITS: 100 INJECTION, SOLUTION INTRAVENOUS; SUBCUTANEOUS at 12:31

## 2023-08-08 RX ADMIN — ZOLPIDEM TARTRATE 10 MG: 5 TABLET ORAL at 21:18

## 2023-08-08 RX ADMIN — METOPROLOL SUCCINATE 100 MG: 100 TABLET, EXTENDED RELEASE ORAL at 18:06

## 2023-08-08 RX ADMIN — FLECAINIDE ACETATE 100 MG: 50 TABLET ORAL at 10:00

## 2023-08-08 RX ADMIN — METOPROLOL SUCCINATE 100 MG: 100 TABLET, EXTENDED RELEASE ORAL at 10:00

## 2023-08-08 RX ADMIN — APIXABAN 5 MG: 5 TABLET, FILM COATED ORAL at 10:00

## 2023-08-08 RX ADMIN — FLECAINIDE ACETATE 100 MG: 50 TABLET ORAL at 21:18

## 2023-08-08 RX ADMIN — DILTIAZEM HYDROCHLORIDE 120 MG: 120 CAPSULE, COATED, EXTENDED RELEASE ORAL at 10:00

## 2023-08-08 RX ADMIN — APIXABAN 5 MG: 5 TABLET, FILM COATED ORAL at 21:18

## 2023-08-08 RX ADMIN — ACETAMINOPHEN 650 MG: 325 TABLET, FILM COATED ORAL at 10:51

## 2023-08-08 RX ADMIN — LOSARTAN POTASSIUM 50 MG: 50 TABLET, FILM COATED ORAL at 10:00

## 2023-08-08 RX ADMIN — ROPINIROLE 0.5 MG: 0.25 TABLET, FILM COATED ORAL at 21:19

## 2023-08-08 RX ADMIN — GABAPENTIN 300 MG: 300 CAPSULE ORAL at 21:18

## 2023-08-08 RX ADMIN — LOSARTAN POTASSIUM 50 MG: 50 TABLET, FILM COATED ORAL at 21:19

## 2023-08-08 RX ADMIN — DULOXETINE HYDROCHLORIDE 30 MG: 30 CAPSULE, DELAYED RELEASE ORAL at 10:00

## 2023-08-08 RX ADMIN — EZETIMIBE 10 MG: 10 TABLET ORAL at 10:00

## 2023-08-08 NOTE — TELEPHONE ENCOUNTER
Patient daughter called with concerns about her mothers hospitalization. She wants to know if you can review her labs and see if you think she is in "heart failure" based on her labs results. Daughter is very concerned and frustrated she has yet to speak to someone involved in her care at the hospital.      We did have to disconnect the call while we were talking because the hospital was calling her.   LIZABETH

## 2023-08-08 NOTE — ASSESSMENT & PLAN NOTE
8/5/2023 patient continues to go in and out of afib with RVR, denies palpitations or anxiety  · Subsequently started on Cardizem drip, cardiology consulted and added flecainide  · Weaned off Cardizem gtt as of 0930 08/06/2023  · [id number]  · Likely in the setting postoperative trigger with somewhat superimposed uncontrolled pain  · Flecainide and Cardizem did not resolve pt's afib, cardioversion procedure was done and successful (8/7/23)    Plan  · As per cards, metoprolol 100mg, cardizem 30mg PO q6H, flecainide 100 mg q12H  · Continue monitoring on telemetry  · SHANNAN cardioversion successful, will follow up outpatient. · Continue taking medications given at discharge until outpatient follow up with cardiology.

## 2023-08-08 NOTE — CASE MANAGEMENT
Case Management Assessment & Discharge Planning Note    Patient name Eder RODRIGUEZ /S -01 MRN [id number]  : 1945 Date 2023       Current Admission Date: 2023  Current Admission Diagnosis:A-fib with RVR [facility])   Patient Active Problem List    Diagnosis Date Noted   • Anemia 2023   • Abnormal CT of the chest 2023   • Continuous opioid dependence ([address]) 2023   • Nonrheumatic mitral valve regurgitation 2023   • Nonrheumatic aortic valve insufficiency 2023   • Nonrheumatic tricuspid valve regurgitation 2023   • Type 2 diabetes mellitus without complication, without long-term current use of insulin ([address]) 2022   • Pulmonary hypertension ([address]) 2022   • Hyponatremia 2022   • Meningioma ([address]) 2022   • Stage 3b chronic kidney disease ([address]) 2022   • Chronic tension-type headache, not intractable 2022   • Vasomotor rhinitis 2021   • MGUS (monoclonal gammopathy of unknown significance) 2021   • Hurthle cell adenoma of thyroid 2021   • Tremors of nervous system 2021   • Hx of diplopia 2021   • S/P placement of cardiac pacemaker 2021   • Current use of long term anticoagulation 2021   • Malignant neoplasm of thyroid gland ([address]) 2021   • Chronic diastolic CHF (congestive heart failure) ([address]) 2021   • Chronic rhinitis 11/10/2020   • Arthritis of both acromioclavicular joints 2020   • Carpal tunnel syndrome on right 2019   • Osteoarthritis of shoulder    • TMJ syndrome 10/18/2017   • A-fib with RVR ([address]) 2016   • Essential hypertension 2016   • Peripheral neuropathy 2016   • Lumbar spondylosis 2015   • Lumbar stenosis 2015   • Restless legs syndrome 2014   • Allergic rhinitis 2013   • Osteoarthritis of knee 2013   • Insomnia 2013   • Osteopenia 2012   • Fibromyalgia 10/16/2012   • Hyperlipidemia 10/16/2012   • Osteoarthrosis, hand 10/16/2012   • Vitamin D deficiency 10/16/2012      LOS (days): 4  Geometric Mean LOS (GMLOS) (days): 2.30  Days to GMLOS:-1.5     OBJECTIVE:    Risk of Unplanned Readmission Score: 25.84         Current admission status: Inpatient       Preferred Pharmacy:   [id number] [place], [address]  [address] [facility] [address]  Phone: [phone] Fax: [phone]    [id number] [place], [address] [facility] [address]. [address] [place] [address]  Phone: [phone] Fax: [phone]    Primary Care Provider: Rosaline Echavarria MD    Primary Insurance: MEDICARE  Secondary Insurance: AARP    ASSESSMENT:  Active Health Care Proxies    There are no active Health Care Proxies on file. Patient Information  Admitted from[de-identified] Home  Mental Status: Alert  During Assessment patient was accompanied by: Not accompanied during assessment  Assessment information provided by[de-identified] Patient  Primary Caregiver: Self  Support Systems: Self, Daughter  Home entry access options.  Select all that apply.: Ramp  [facility]  Homeless/housing insecurity resource given?: N/A  Living Arrangements: Lives Alone    Activities of Daily Living Prior to Admission  Functional Status: Independent  Completes ADLs independently?: Yes  Ambulates independently?: Yes  Does patient use assisted devices?: Yes  Assisted Devices (DME) used: Jesica Buerger, Bedside Commode, Shower Chair, Other (Comment) (bathroom bars)  Does patient currently own DME?: Yes  What DME does the patient currently own?: Bedside Commode, Other (Comment), Shower Chair, Straight Cane, Walker  Does patient have a history of Outpatient Therapy (PT/OT)?: Yes  Does the patient have a history of Short-Term Rehab?: No  Does patient have a history of HHC?: Yes         Patient Information Continued  Income Source: Pension/alf  Food insecurity resource given?: N/A  Does patient receive dialysis treatments?: No  Does patient have a history of substance abuse?: No  Does patient have a history of Mental Health Diagnosis?: No         Means of Transportation  Means of Transport to Appts[de-identified] Drives Self  Was application for public transport provided?: N/A        DISCHARGE DETAILS:    Discharge planning discussed with[de-identified] pt  Freedom of Choice: Yes  Comments - Freedom of Choice: CM discussed hhc rec  CM contacted family/caregiver?: No- see comments  Were Treatment Team discharge recommendations reviewed with patient/caregiver?: Yes  Did patient/caregiver verbalize understanding of patient care needs?: Yes  Were patient/caregiver advised of the risks associated with not following Treatment Team discharge recommendations?: Yes    Contacts  Reason/Outcome: Discharge [address]         Is the patient interested in [facility] at discharge?: Yes  [address] [facility] requested[de-identified] Physical [address] [facility] Name[de-identified] [facility] External Referral Reason (only applicable if external HHA name selected): Patient has established relationship with provider  [address] Provider[de-identified] PCP  Home Health Services Needed[de-identified] Evaluate Functional Status and Safety  Homebound Criteria Met[de-identified] Requires the Assistance of Another Person for Safe Ambulation or to Leave the Home  Supporting Clincal Findings[de-identified] Limited Endurance                   Treatment Team Recommendation: Home with [address]   Discharge Destination Plan[de-identified] Home with [address] [facility]

## 2023-08-08 NOTE — TELEPHONE ENCOUNTER
Daughter called is concerned cause Kalen Merino is in the hospital currently.      Daughter has concerns from a cardiology standpoint and would like to speak with you at [phone]

## 2023-08-08 NOTE — ASSESSMENT & PLAN NOTE
Recent Labs     08/06/23  0352 08/08/23  0532   HGB 11.5 9.5*       · Continue current treatment regimen.

## 2023-08-08 NOTE — ASSESSMENT & PLAN NOTE
Blood Pressure: (!) 139/49  · Continue home meds amlodipine 5 mg once a day after dinner, losartan 50 milligrams twice daily  · Continue home dosage of Metoprolol at home, f/u with outpatient cardiology.

## 2023-08-08 NOTE — ASSESSMENT & PLAN NOTE
Recent Labs     08/06/23  0352 08/08/23  0532   SODIUM 133* 130*       · Continue home treatment regimen.

## 2023-08-08 NOTE — ASSESSMENT & PLAN NOTE
Wt Readings from Last 3 Encounters:   08/08/23 71.1 kg (156 lb 12.8 oz)   07/03/23 73.9 kg (163 lb)   06/26/23 73.1 kg (161 lb 2 oz)     Last echo June 2023 preop evaluation. LVEF 55%, G2DD  Pt not in CHF exacerbation t/o stay in hospital  Home regimen Lasix 20 mg as needed was recommended by Dr. Hilda Raines cardiology on the outpatient setting. Will follow up after discharge.

## 2023-08-08 NOTE — PHYSICAL THERAPY NOTE
PHYSICAL THERAPY EVALUATION  DATE: 08/08/23  TIME: 3298-6832    NAME:  Zulma Simeon  AGE:   77 y.o.  Mrn:   [id number]  Length Of Stay: 4    ADMIT DX:  A-fib ([address]) [I48.91]  Atrial fibrillation with RVR ([address]) [I48.91]  S/P placement of cardiac pacemaker [G33.7]  Chronic diastolic CHF (congestive heart failure) ([address]) [I50.32]    Past Medical History:   Diagnosis Date    Actinic keratosis     last assessed - [id number]    Arthritis     Atrial fibrillation with rapid ventricular response ([address])     last assessed - 26Apr2016    Basal cell carcinoma     Benign colon polyp     last assessed - 27Apr2015    CHF (congestive heart failure) ([address]) 06/2022    Disease of thyroid gland     Effusion of knee joint right     Resolved - [id number]    Esophageal reflux     Fibromyalgia     last assessed - [id number]    Fibromyalgia, primary     GERD (gastroesophageal reflux disease)     Hypertension     Palpitations     last assessed - 30Apr2013    Peroneal tendonitis, right     last assessed - 25Yyd2518    Right lumbar radiculopathy 03/17/2016    Thyroid cancer ([address])     Thyroid nodule     Trochanteric bursitis of left hip 03/09/2018     Past Surgical History:   Procedure Laterality Date    CARDIAC ELECTROPHYSIOLOGY STUDY AND ABLATION  08/2022    CATARACT EXTRACTION Bilateral     COLONOSCOPY  03/2018    COLONOSCOPY W/ POLYPECTOMY  2021    repeat in 5 years    EYE SURGERY      HYSTERECTOMY      JOINT REPLACEMENT Left     knee    VT NEUROPLASTY &/TRANSPOS MEDIAN NRV CARPAL TUNNE Right 11/14/2019    Procedure: RELEASE CARPAL TUNNEL;  Surgeon: Vincent Parr MD;  [facility];  Service: Orthopedics    VT TOTAL THYROID LOBECTOMY UNI W/WO ISTHMUSECTOMY Left 12/16/2020    Procedure: Left THYROID LOBECTOMY;  Surgeon: Nicole Lopez MD;  Location[facility];  Service: ENT    RECTAL POLYPECTOMY      REPLACEMENT TOTAL KNEE Left     last assessed - 27Apr2015    TONSILLECTOMY      TOTAL ABDOMINAL HYSTERECTOMY      TUBAL LIGATION      US GUIDED THYROID BIOPSY  10/14/2020       Performed at least 2 patient identifiers during session: Name, [place]ien [place], and ID bracelet     08/08/23 1148   PT Last Visit   PT Visit Date 08/08/23   Note Type   Note type Evaluation   Pain Assessment   Pain Assessment Tool 0-10   Pain Score No Pain   Effect of Pain on Daily Activities n/a   Hospital Pain Intervention(s) Repositioned; Ambulation/increased activity   Multiple Pain Sites No   Restrictions/Precautions   Weight Bearing Precautions Per Order Yes   LLE Weight Bearing Per Order (S)  TTWB  (Per Discharge summary [facility], Dr Nida Pink, orthopedic surgery (L TKA revision))   Other Precautions WBS; Fall Risk   Home Living   Type of [address] [place] [address] Dr One level;Ramped entrance   Bathroom Shower/Tub Walk-in shower   [address] bars in shower; Shower chair;Commode   Bathroom Accessibility Accessible via walker   Home Equipment Walker;Cane   Prior Function   Level of [place] Independent with ADLs; Independent with functional mobility; Needs assistance with IADLS   Lives With Alone   Receives Help From Family  (daughter lives nextdoor)   IADLs Independent with driving; Independent with meal prep; Independent with medication management   Falls in the last 6 months 1 to 4  (pt reports 1 fall)   Vocational Retired   Comments Pt reports that prior to her L TKA revision on 7/31/23 she was fully independent with all aspects of self care and functional mobility with no AD. Since her orthopedic surgery she reports being independent with self care and MAYA with household ambuation with RW (+ TTWB). Pt reports that per Dr. Macy Danielson [facility]), no aggressive PT until his clearance post op. General   Additional Pertinent History Pt admited 8/4/23 with Afib with RVR. Recent L TKA revision 7/31/23 with Dr. Macy Danielson at [facility], [address] [facility] with RW.    Family/Caregiver Present No   Cognition   Overall Cognitive Status [facility]   Arousal/Participation Alert Orientation Level Oriented X4   Memory Within functional limits   Following Commands Follows all commands and directions without difficulty   Subjective   Subjective "I would of course love to go home, but I understand my daughter's concerns."   RUE Assessment   RUE Assessment WFL   LUE Assessment   LUE Assessment WFL   RLE Assessment   RLE Assessment WFL   LLE Assessment   LLE Assessment WFL  (post op L TKA revision 7/31/23)   Vision-Basic Assessment   Current Vision Wears glasses all the time   Coordination   Movements are Fluid and Coordinated 1   Sensation WFL   Light Touch   RLE Light Touch Grossly intact   LLE Light Touch Grossly intact   Proprioception   RLE Proprioception Grossly intact   LLE Proprioception Grossly Intact   Self Selected Walking Speed   SSWS Trial 1 (Seconds) 9.23 Seconds   SSWS Trial 2 (Seconds) 8.63 Seconds   SSWS Trial 3 (Seconds) 9.18 Seconds   SSWS Average Time (Seconds) 9 seconds   SSWS Average Score (m/sec) 0.6 m/sec   Bed Mobility   Additional Comments Bed mobility NT on this date as pt presents and was left seated OOB in chair. Transfers   Sit to Stand 6  Modified independent   Additional items Increased time required   Stand to Sit 6  Modified independent   Additional items Increased time required   Stand pivot 6  Modified independent   Additional items Increased time required; Other  (RW)   Additional Comments Pt with good application of TTWB L LE during transfers. No instability not upon stand. Ambulation/Elevation   Gait pattern Decreased foot clearance; Short stride;Decreased hip extension; Step to   Gait Assistance 6  Modified independent   Additional items Verbal cues   Assistive Device Rolling walker   Distance 80ft x1   Stair Management Assistance Not tested   Balance   Static Sitting Normal   Dynamic Sitting Good   Static Standing Fair +   Dynamic Standing Fair +   Ambulatory Fair +   Endurance Deficit   Endurance Deficit No   Activity Tolerance   Activity Tolerance Patient tolerated treatment well   Medical Staff Made Aware Spoke with DANII HOPKINS, [place] resident [address] [facility] with COLLEEN Campoverde Loss   Assessment   Prognosis Good   Problem List Decreased range of motion; Impaired balance;Decreased mobility; Decreased skin integrity;Orthopedic restrictions;Pain   Assessment Pt seen for PT evaluation for mobility assessment & discharge needs. Activity orders: up and OOB as tolerated. Pt admitted 8/4/2023 w/ dx Paroxysmal atrial fibrillation ([address]). Comorbidities affecting pt's fnxl performance include: L TKA revision 7/31/23 (TTWB L LE), Afib, CHF, DM, neuropathy, OA, meningioma. During PT IE, pt independently completes transfers and ambulation of 80ft with RW + good maintenance of TTWB L LE. Pt displays a gait speed of 0.6m/s qualifying pt as a "limited community ambulator". The AM-PAC & Barthel Index outcome tools were used to assist in determining pt safety w/ mobility/self care & appropriate d/c recommendations, see above for scores. Pt is at risk of falls d/t multiple comorbidities, h/o falls, impaired balance, use of ambulatory aid, varying levels of pain , acuity of medical illness and ongoing medical treatment of primary dx. Pt will benefit from 1-2 addt'l PT services in order to decrease risk of falls, maximize independence w/ fnxl mobility, & ensure safety w/ mobility for transition to next level of care. Based on pt presentation & impairments, pt would most appropriately benefit from Level II (moderate resource intensity), such as return to home with home health PT services. Barriers to Discharge Decreased caregiver support   Goals   Patient Goals "to go home"   [facility] Expiration Date 08/18/23   Short Term Goal #1 Patient PT goals established in order to address patient self reported goal of "to go home".  Pt will: ambulate >250ft with RW + TTWB at MAYA level in order to increase safety with household and community functional mobility; demonstrate understanding and independence with LE ROM HEP (per Dr. Artie López request no aggressive strengthening yet); improve unsupported dynamic standing balance to >/= good grade in order to promote safety and increased independence with mobility; improve AM-PAC score to >/= 24/24 in order to increase independence with mobility and decrease burden of care; improve Barthel Index score to >/= 100/100 in order to increase independence and decrease risk of falls. PT Treatment Day 0   Plan   Treatment/Interventions Therapeutic exercise; Endurance training;Patient/family training;Equipment eval/education;Gait training;Spoke to nursing;Spoke to case management;Spoke to MD   PT Frequency 1-2x/wk   Recommendation   PT Discharge Recommendation No rehabilitation needs   AM-PAC Basic Mobility Inpatient   Turning in Flat Bed Without Bedrails 4   Lying on Back to Sitting on Edge of Flat Bed Without Bedrails 4   Moving Bed to Chair 4   Standing Up From Chair Using Arms 4   Walk in Room 4   Climb 3-5 Stairs With Railing 3   Basic Mobility Inpatient Raw Score 23   Basic Mobility Standardized Score 50.88   Highest Level Of Mobility   JH-HLM Goal 7: Walk 25 feet or more   JH-HLM Achieved 7: Walk 25 feet or more   Modified Hockley Scale   Modified Hockley Scale 2   Barthel Index   Feeding 10   Bathing 5   Grooming Score 5   Dressing Score 10   Bladder Score 10   Bowels Score 10   Toilet Use Score 10   Transfers (Bed/Chair) Score 15   Mobility (Level Surface) Score 10   Stairs Score 5   Barthel Index Score 90   End of Consult   Patient Position at End of Consult Other (comment)  (bathroom with OT)   End of Consult Comments Based on patient's Everet Ortiz Highest Level of Mobility scores today, patient currently has a goal of -HLM Levels: 7: WALK 25 FEET OR MORE, to be completed with RN staffing each shift, in order to improve overall activity tolerance and mobility, combat hospital related deconditioning, and maximize outcomes for d/c from the acute care setting. Gait Speed Interpretation:  Gain of 0.1 m/s is a predictor of well-being in those w/ abnormal walking speed compared to age matched peers  <0.7m/s is at an increased risk for falls  Household ambulator: <0.4 m/s  Limited community ambulator: 0.4-0.8 m/s  [employer] ambulator: 0.8-1.2 m/s  Able to safely cross streets: >1.2 m/s      The patient's AM-PAC Basic Mobility Inpatient Short Form Raw Score is 23. A Raw score of greater than 16 suggests the patient may benefit from discharge to home. Please also refer to the recommendation of the Physical Therapist for safe discharge planning.           Stevo Mazariegos PT, DPT   Available via [employer]  [facility] # [phone]  [place] License - [id number]  1/5/4173

## 2023-08-08 NOTE — OCCUPATIONAL THERAPY NOTE
Occupational Therapy Evaluation       08/08/23 1129   Note Type   Note type Evaluation   Pain Assessment   Pain Assessment Tool 0-10   Restrictions/Precautions   Weight Bearing Precautions Per Order Yes   LLE Weight Bearing Per Order TTWB  (Per Discharge summary [facility], Dr Terra Lemus, orthopedic surgery)   Other Precautions Fall Risk;WBS   Home Living   Type of [facility]  One level  (Ramp to enter)   Bathroom Shower/Tub Walk-in shower   Bathroom Toilet Raised   Bathroom Equipment Grab bars in shower; Shower chair;Commode;Grab bars around toilet   Home Equipment Walker;Cane   Prior Function   Level of [place] Independent with ADLs; Independent with functional mobility   Lives With Alone   Receives Help From Family  (Daughter lives next door)   IADLs Independent with driving; Independent with meal prep; Independent with medication management   Falls in the last 6 months 1 to 4   Comments Patient reports prior to surgery 7/31 was independent in all ADLs and mobility without AD; since surgery has been independent in dressing, not gotten into the shower yet, modified independent in ambulation with use of RW, getting assist for iADLs from daughter after surgery   General   Additional Pertinent History Patient presented to hospital with chest pain; s/p L TKA revision 7/31/23   ADL   Eating Assistance 7  Independent   Grooming Assistance 7  Independent   Grooming Deficit Wash/dry hands  (Standing unsupported to sink)   [address] 7  Independent   LB Bathing Assistance 7  Independent   UB Dressing Assistance 7  Independent   LB Dressing Assistance 7  [place] into pants; Thread LLE into pants; Thread RLE into underwear; Thread LLE into underwear  (Don/doff bilateral slippers, don scrub pants)   Toileting Assistance  7  Independent   Toileting Deficit Clothing management up;Clothing management down;Perineal hygiene   Bed Mobility   Additional Comments Not assessed, patient received OOB in chair   Transfers   Sit to Stand 7  Independent   Stand to Sit 7  Independent   Toilet transfer 6  Modified independent   Additional items Standard toilet  (Ambulatory transfer with RW)   Functional Mobility   Functional Mobility 6  Modified independent   Additional Comments Ambulated short household distance to/from bathroom with RW; good maintenance of TTWB LLE   Balance   Static Sitting Good   Dynamic Sitting Good   Static Standing Good   Dynamic Standing Good   Activity Tolerance   Activity Tolerance Patient tolerated treatment well   RUE Assessment   RUE Assessment WFL   LUE Assessment   LUE Assessment WFL   Cognition   Overall Cognitive Status WFL   Arousal/Participation Alert; Cooperative   Attention Within functional limits   Orientation Level Oriented X4   Following Commands Follows all commands and directions without difficulty   Assessment   Prognosis Good   Assessment Patient evaluated by Occupational Therapy. Patient admitted with Paroxysmal atrial fibrillation ([address]). The patients occupational profile, medical and therapy history includes a brief history with review of medical and/or therapy records related to the presenting problem. Comorbidities affecting functional mobility and ADLS include: arthritis, HTN, Afib, esophageal reflux, basal cell carcinoma, fibromyalgia, GERD, CHF, s/p L TKA revision 7/31/23. Prior to admission, patient was independent with functional mobility with RW, independent with ADLS and requiring assist for IADLS. The evaluation identifies the following performance deficits: no deficits, that result in activity limitations and/or participation restrictions. This evaluation requires clinical decision making of low complexity, because the patients presents with no comorbidities that affect occupational performance and required no modification of tasks or assistance with consideration of a limited number of treatment options.   The Barthel Index was used as a functional outcome tool presenting with a score of Barthel Index Score: 100, indicating no limitations of functional mobility and ADLS. The patient's raw score on the AM-PAC Daily Activity Inpatient Short Form is 24. A raw score of greater than or equal to 19 suggests the patient may benefit from discharge to home. Please refer to the recommendation of the Occupational Therapist for safe discharge planning. Patient is currently able to perform all ADLs and mobility at an independent to modified independent level with use of RW; recommend return home, no further skilled inpatient OT services indicated at this time. OT orders to be discharged.    Goals   Patient Goals To go back home   Plan   OT Frequency Eval only   Recommendation   OT Discharge Recommendation No rehabilitation needs   AM-PAC Daily Activity Inpatient   Lower Body Dressing 4   Bathing 4   Toileting 4   Upper Body Dressing 4   Grooming 4   Eating 4   Daily Activity Raw Score 24   Daily Activity Standardized Score (Calc for Raw Score >=11) 57.54   AM-PAC Applied Cognition Inpatient   Following a Speech/Presentation 4   Understanding Ordinary Conversation 4   Taking Medications 4   Remembering Where Things Are Placed or Put Away 4   Remembering List of 4-5 Errands 4   Taking Care of Complicated Tasks 4   Applied Cognition Raw Score 24   Applied Cognition Standardized Score 62.21   Barthel Index   Feeding 10   Bathing 5   Grooming Score 5   Dressing Score 10   Bladder Score 10   Bowels Score 10   Toilet Use Score 10   Transfers (Bed/Chair) Score 15   Mobility (Level Surface) Score 15   Stairs Score 10   Barthel Index Score 100

## 2023-08-08 NOTE — PLAN OF CARE
Problem: Potential for Falls  Goal: Patient will remain free of falls  Description: INTERVENTIONS:  - Educate patient/family on patient safety including physical limitations  - Instruct patient to call for assistance with activity   - Consult OT/PT to assist with strengthening/mobility   - Keep Call bell within reach  - Keep bed low and locked with side rails adjusted as appropriate  - Keep care items and personal belongings within reach  - Initiate and maintain comfort rounds  - Make Fall Risk Sign visible to staff  - Offer Toileting every 2 Hours, in advance of need  - Initiate/Maintain bed/chair alarm  - Obtain necessary fall risk management equipment  - Apply yellow socks and bracelet for high fall risk patients  - Consider moving patient to room near nurses station  Outcome: Progressing     Problem: MOBILITY - ADULT  Goal: Maintain or return to baseline ADL function  Description: INTERVENTIONS:  -  Assess patient's ability to carry out ADLs; assess patient's baseline for ADL function and identify physical deficits which impact ability to perform ADLs (bathing, care of mouth/teeth, toileting, grooming, dressing, etc.)  - Assess/evaluate cause of self-care deficits   - Assess range of motion  - Assess patient's mobility; develop plan if impaired  - Assess patient's need for assistive devices and provide as appropriate  - Encourage maximum independence but intervene and supervise when necessary  - Involve family in performance of ADLs  - Assess for home care needs following discharge   - Consider OT consult to assist with ADL evaluation and planning for discharge  - Provide patient education as appropriate  Outcome: Progressing  Goal: Maintains/Returns to pre admission functional level  Description: INTERVENTIONS:  - Perform BMAT or MOVE assessment daily.   - Set and communicate daily mobility goal to care team and patient/family/caregiver.    - Collaborate with rehabilitation services on mobility goals if consulted  - Stand patient 3 times a day  - Ambulate patient 3 times a day  - Out of bed to chair 2 times a day   - Out of bed for meals 2 times a day  - Out of bed for toileting  - Record patient progress and toleration of activity level   Outcome: Progressing     Problem: CARDIOVASCULAR - ADULT  Goal: Maintains optimal cardiac output and hemodynamic stability  Description: INTERVENTIONS:  - Monitor I/O, vital signs and rhythm  - Monitor for S/S and trends of decreased cardiac output  - Administer and titrate ordered vasoactive medications to optimize hemodynamic stability  - Assess quality of pulses, skin color and temperature  - Assess for signs of decreased coronary artery perfusion  - Instruct patient to report change in severity of symptoms  Outcome: Progressing  Goal: Absence of cardiac dysrhythmias or at baseline rhythm  Description: INTERVENTIONS:  - Continuous cardiac monitoring, vital signs, obtain 12 lead EKG if ordered  - Administer antiarrhythmic and heart rate control medications as ordered  - Monitor electrolytes and administer replacement therapy as ordered  Outcome: Progressing     Problem: METABOLIC, FLUID AND ELECTROLYTES - ADULT  Goal: Glucose maintained within target range  Description: INTERVENTIONS:  - Monitor Blood Glucose as ordered  - Assess for signs and symptoms of hyperglycemia and hypoglycemia  - Administer ordered medications to maintain glucose within target range  - Assess nutritional intake and initiate nutrition service referral as needed  Outcome: Progressing     Problem: MUSCULOSKELETAL - ADULT  Goal: Maintain or return mobility to safest level of function  Description: INTERVENTIONS:  - Assess patient's ability to carry out ADLs; assess patient's baseline for ADL function and identify physical deficits which impact ability to perform ADLs (bathing, care of mouth/teeth, toileting, grooming, dressing, etc.)  - Assess/evaluate cause of self-care deficits   - Assess range of motion  - Assess patient's mobility  - Assess patient's need for assistive devices and provide as appropriate  - Encourage maximum independence but intervene and supervise when necessary  - Involve family in performance of ADLs  - Assess for home care needs following discharge   - Consider OT consult to assist with ADL evaluation and planning for discharge  - Provide patient education as appropriate  Outcome: Progressing  Goal: Maintain proper alignment of affected body part  Description: INTERVENTIONS:  - Support, maintain and protect limb and body alignment  - Provide patient/ family with appropriate education  Outcome: Progressing

## 2023-08-08 NOTE — ASSESSMENT & PLAN NOTE
Blood Pressure: 147/67  · Continue home meds amlodipine 5 mg once a day after dinner, losartan 50 milligrams twice daily  · Continue home dosage of Metoprolol at home, f/u with outpatient cardiology.

## 2023-08-08 NOTE — ASSESSMENT & PLAN NOTE
8/5/2023 patient continues to go in and out of afib with RVR, denies palpitations or anxiety  · Subsequently started on Cardizem drip, cardiology consulted and added flecainide  · Weaned off Cardizem gtt as of 0930 08/06/2023  · [id number]  · Likely in the setting postoperative trigger with somewhat superimposed uncontrolled pain  · Flecainide and Cardizem did not resolve pt's afib, cardioversion procedure was done and successful (8/7/23)    Plan  · As per cards, metoprolol 100mg, cardizem 30mg PO q6H, flecainide 100 mg q12H  · Continue monitoring on telemetry  · SHANNAN cardioversion successful, will follow up outpatient. · Continue taking medications given at discharge until outpatient follow up with cardiology.

## 2023-08-08 NOTE — PROGRESS NOTES
[address] [facility]  Progress Note  Name: Dori Scott  MRN: [id number]  Unit/Bed#: S -01 I Date of Admission: 8/4/2023   Date of Service: 8/8/2023 I Hospital Day: 4    Assessment/Plan   * A-fib with RVR [facility])  Assessment & Plan  8/5/2023 patient continues to go in and out of afib with RVR, denies palpitations or anxiety  · Subsequently started on Cardizem drip, cardiology consulted and added flecainide  · Weaned off Cardizem gtt as of 0930 08/06/2023  · [id number]  · Likely in the setting postoperative trigger with somewhat superimposed uncontrolled pain  · Flecainide and Cardizem did not resolve pt's afib, cardioversion procedure was done and successful (8/7/23)    Plan  · As per cards, metoprolol 100mg, cardizem 30mg PO q6H, flecainide 100 mg q12H  · Continue monitoring on telemetry  · SHANNAN cardioversion successful, will follow up outpatient. · Continue taking medications given at discharge until outpatient follow up with cardiology. Abnormal CT of the chest  Assessment & Plan  · 08/04/2023 CTA PE "Mildly heterogeneous liver with slight lobulation along the left lobe. Early cirrhosis is not excluded and follow-up with ultrasound is recommended. There is a 5 mm left lower lobe nodule on series 304 image 192, stable from 2021. There is no tracheal or endobronchial lesion. Incidental discovery of one or more thyroid nodule(s) measuring more than 1.5 cm and without suspicious features "  · Pending RUQ results  · Recommend follow-up with primary care provider for nonurgent thyroid ultrasound    Anemia  Assessment & Plan  Recent Labs     08/06/23  0352 08/08/23  0532   HGB 11.5 9.5*       · Continue current treatment regimen.     Type 2 diabetes mellitus without complication, without long-term current use of insulin [facility])  Assessment & Plan  Lab Results   Component Value Date    HGBA1C 6.6 (H) 07/03/2023       Recent Labs     08/07/23  1545 08/07/23  2046 08/08/23  0825 08/08/23  1158   POCGLU 158* 251* 135 176*       Blood Sugar Average: Last 72 hrs:  Not on medication  · Continue carbohydrate controlled diet at home  · Continue home diabetic medication upon discharge. Hyponatremia  Assessment & Plan  Recent Labs     08/06/23  0352 08/08/23  0532   SODIUM 133* 130*       · Continue home treatment regimen. Stage 3b chronic kidney disease [facility])  Assessment & Plan  Lab Results   Component Value Date    EGFR 69 08/08/2023    EGFR 84 08/06/2023    EGFR 81 08/05/2023    CREATININE 0.81 08/08/2023    CREATININE 0.66 08/06/2023    CREATININE 0.71 08/05/2023     Continue current home treatment regimen. Chronic diastolic CHF (congestive heart failure) (HCC)  Assessment & Plan  Wt Readings from Last 3 Encounters:   08/08/23 71.1 kg (156 lb 12.8 oz)   07/03/23 73.9 kg (163 lb)   06/26/23 73.1 kg (161 lb 2 oz)     Last echo June 2023 preop evaluation. LVEF 55%, G2DD  Pt not in CHF exacerbation t/o stay in hospital  Home regimen Lasix 20 mg as needed was recommended by Dr. Francois Handy cardiology on the outpatient setting. Will follow up after discharge. Essential hypertension  Assessment & Plan  Blood Pressure: (!) 139/49  · Continue home meds amlodipine 5 mg once a day after dinner, losartan 50 milligrams twice daily  · Continue home dosage of Metoprolol at home, f/u with outpatient cardiology. VTE Pharmacologic Prophylaxis: VTE Score: 10 High Risk (Score >/= 5) - Pharmacological DVT Prophylaxis Ordered: apixaban (Eliquis). Sequential Compression Devices Ordered. Patient Centered Rounds: I performed bedside rounds with nursing staff today. Discussions with Specialists or Other Care Team Provider: Dr. Jean Carlos Ayala    Education and Discussions with Family / Patient: Updated  (daughter) via phone. Current Length of Stay: 4 day(s)  Current Patient Status: Inpatient   Discharge Plan: Anticipate discharge tomorrow to home.     Code Status: Level 3 - DNAR and DNI    Subjective:   Pt in no apparent distress, no overnight events noted. Pt in good spirits and awaiting discharge. No complaints reported at this time. Objective:     Vitals:   Temp (24hrs), Av.9 °F (36.6 °C), Min:97.8 °F (36.6 °C), Max:98.2 °F (36.8 °C)    Temp:  [97.8 °F (36.6 °C)-98.2 °F (36.8 °C)] 97.8 °F (36.6 °C)  HR:  [68-69] 69  Resp:  [16-20] 18  BP: (138-147)/(49-67) 139/49  SpO2:  [95 %-97 %] 96 %  Body mass index is 29.63 kg/m². Input and Output Summary (last 24 hours):   No intake or output data in the 24 hours ending 23 1559    Physical Exam:   Physical Exam  Vitals and nursing note reviewed. Constitutional:       General: She is not in acute distress. Appearance: She is well-developed. HENT:      Head: Normocephalic and atraumatic. Eyes:      Conjunctiva/sclera: Conjunctivae normal.   Cardiovascular:      Rate and Rhythm: Normal rate and regular rhythm. Heart sounds: No murmur heard. Pulmonary:      Effort: Pulmonary effort is normal. No respiratory distress. Breath sounds: Normal breath sounds. Abdominal:      Palpations: Abdomen is soft. Tenderness: There is no abdominal tenderness. Musculoskeletal:         General: No swelling. Cervical back: Neck supple. Skin:     General: Skin is warm and dry. Capillary Refill: Capillary refill takes less than 2 seconds. Neurological:      Mental Status: She is alert. Psychiatric:         Mood and Affect: Mood normal.         Additional Data:     Labs:  Results from last 7 days   Lab Units 23  0532 23  0600 23  1340   WBC Thousand/uL 7.40   < > 7.61   HEMOGLOBIN g/dL 9.5*   < > 10.0*   HEMATOCRIT % 28.6*   < > 30.9*   PLATELETS Thousands/uL 346   < > 278   NEUTROS PCT %  --   --  62   LYMPHS PCT %  --   --  23   MONOS PCT %  --   --  11   EOS PCT %  --   --  3    < > = values in this interval not displayed.      Results from last 7 days   Lab Units 23  0532   SODIUM mmol/L 130*   POTASSIUM mmol/L 4.1   CHLORIDE mmol/L 99   CO2 mmol/L 23   BUN mg/dL 23   CREATININE mg/dL 0.81   ANION GAP mmol/L 8   CALCIUM mg/dL 9.0   ALBUMIN g/dL 3.7   TOTAL BILIRUBIN mg/dL 1.10*   ALK PHOS U/L 60   ALT U/L 14   AST U/L 14   GLUCOSE RANDOM mg/dL 141*     Results from last 7 days   Lab Units 08/04/23  1340   INR  1.37*     Results from last 7 days   Lab Units 08/08/23  1158 08/08/23  0825 08/07/23  2046 08/07/23  1545 08/07/23  1221 08/07/23  0730 08/06/23  2032 08/06/23  1606 08/06/23  1046 08/06/23  0725 08/05/23  2330 08/05/23  1541   POC GLUCOSE mg/dl 176* 135 251* 158* 124 147* 298* 122 157* 151* 135 207*               Lines/Drains:  Invasive Devices     Peripheral Intravenous Line  Duration           Peripheral IV 08/04/23 Left Antecubital 4 days                      Imaging:   CTA chest  No acute pulmonary embolism. Mildly heterogeneous liver with slight lobulation along the left lobe. Early cirrhosis is not excluded and follow-up with ultrasound is recommended.    Incidental thyroid nodule(s) for which nonemergent thyroid.      CXR: 8/4/2023  No acute cardiopulmonary disease        Last 24 Hours Medication List:   Current Facility-Administered Medications   Medication Dose Route Frequency Provider Last Rate   • acetaminophen  650 mg Oral Q6H PRN Bj Navarro MD     • apixaban  5 mg Oral BID Bj Navarro MD     • diltiazem  120 mg Oral Daily Ana Bueno MD     • DULoxetine  30 mg Oral Daily Bj Navarro MD     • ezetimibe  10 mg Oral Daily Bj Navarro MD     • famotidine  20 mg Oral Every Other Day Bj Navarro MD     • flecainide  100 mg Oral Q12H 158 Harmony Banks MD     • gabapentin  300 mg Oral HS Bj Navarro MD     • insulin lispro  1-5 Units Subcutaneous TID [address] MD     • losartan  50 mg Oral BID Bj Navarro MD     • metoprolol succinate  100 mg Oral BID Kirby Montana PA-C     • oxyCODONE  5 mg Oral Q4H PRN Bonnie Farris MD     • oxyCODONE  2.5 mg Oral Q6H PRN Bonnie Farris MD     • polyethylene glycol  17 g Oral Daily PRN Bonnie Farris MD     • rOPINIRole  0.5 mg Oral HS Bonnie Farris MD     • senna  1 tablet Oral BID Bonnie Farris MD     • zolpidem  10 mg Oral HS PRN Bonnie Farris MD          Today, Patient Was Seen By: Charles Hui MD    **Please Note: This note may have been constructed using a voice recognition system. **

## 2023-08-08 NOTE — ASSESSMENT & PLAN NOTE
Lab Results   Component Value Date    HGBA1C 6.6 (H) 07/03/2023       Recent Labs     08/07/23  1545 08/07/23  2046 08/08/23  0825 08/08/23  1158   POCGLU 158* 251* 135 176*       Blood Sugar Average: Last 72 hrs:  Not on medication  · Continue carbohydrate controlled diet at home  · Continue home diabetic medication upon discharge.

## 2023-08-08 NOTE — ASSESSMENT & PLAN NOTE
Lab Results   Component Value Date    EGFR 69 08/08/2023    EGFR 84 08/06/2023    EGFR 81 08/05/2023    CREATININE 0.81 08/08/2023    CREATININE 0.66 08/06/2023    CREATININE 0.71 08/05/2023     Continue current home treatment regimen.

## 2023-08-09 VITALS
DIASTOLIC BLOOD PRESSURE: 76 MMHG | RESPIRATION RATE: 18 BRPM | WEIGHT: 157.41 LBS | SYSTOLIC BLOOD PRESSURE: 156 MMHG | BODY MASS INDEX: 29.72 KG/M2 | HEIGHT: 61 IN | HEART RATE: 76 BPM | TEMPERATURE: 97.8 F | OXYGEN SATURATION: 97 %

## 2023-08-09 DIAGNOSIS — I50.32 CHRONIC DIASTOLIC CHF (CONGESTIVE HEART FAILURE) (HCC): Primary | ICD-10-CM

## 2023-08-09 LAB
ANION GAP SERPL CALCULATED.3IONS-SCNC: 8 MMOL/L
BUN SERPL-MCNC: 20 MG/DL (ref 5–25)
CALCIUM SERPL-MCNC: 9.5 MG/DL (ref 8.4–10.2)
CHLORIDE SERPL-SCNC: 93 MMOL/L (ref 96–108)
CO2 SERPL-SCNC: 26 MMOL/L (ref 21–32)
CREAT SERPL-MCNC: 0.75 MG/DL (ref 0.6–1.3)
GFR SERPL CREATININE-BSD FRML MDRD: 76 ML/MIN/1.73SQ M
GLUCOSE SERPL-MCNC: 154 MG/DL (ref 65–140)
GLUCOSE SERPL-MCNC: 191 MG/DL (ref 65–140)
GLUCOSE SERPL-MCNC: 204 MG/DL (ref 65–140)
POTASSIUM SERPL-SCNC: 4.3 MMOL/L (ref 3.5–5.3)
SODIUM SERPL-SCNC: 127 MMOL/L (ref 135–147)

## 2023-08-09 PROCEDURE — 99239 HOSP IP/OBS DSCHRG MGMT >30: CPT | Performed by: INTERNAL MEDICINE

## 2023-08-09 PROCEDURE — 82948 REAGENT STRIP/BLOOD GLUCOSE: CPT

## 2023-08-09 PROCEDURE — 80048 BASIC METABOLIC PNL TOTAL CA: CPT

## 2023-08-09 RX ORDER — DIAPER,BRIEF,INFANT-TODD,DISP
EACH MISCELLANEOUS 4 TIMES DAILY PRN
Qty: 14 G | Refills: 0 | Status: SHIPPED | OUTPATIENT
Start: 2023-08-09

## 2023-08-09 RX ORDER — LORATADINE 10 MG/1
10 TABLET ORAL DAILY
Status: DISCONTINUED | OUTPATIENT
Start: 2023-08-09 | End: 2023-08-09 | Stop reason: HOSPADM

## 2023-08-09 RX ORDER — FLUTICASONE PROPIONATE 50 MCG
1 SPRAY, SUSPENSION (ML) NASAL DAILY
Status: DISCONTINUED | OUTPATIENT
Start: 2023-08-09 | End: 2023-08-09 | Stop reason: HOSPADM

## 2023-08-09 RX ADMIN — FAMOTIDINE 20 MG: 20 TABLET ORAL at 08:38

## 2023-08-09 RX ADMIN — DILTIAZEM HYDROCHLORIDE 120 MG: 120 CAPSULE, COATED, EXTENDED RELEASE ORAL at 08:38

## 2023-08-09 RX ADMIN — APIXABAN 5 MG: 5 TABLET, FILM COATED ORAL at 08:38

## 2023-08-09 RX ADMIN — DULOXETINE HYDROCHLORIDE 30 MG: 30 CAPSULE, DELAYED RELEASE ORAL at 08:38

## 2023-08-09 RX ADMIN — INSULIN LISPRO 1 UNITS: 100 INJECTION, SOLUTION INTRAVENOUS; SUBCUTANEOUS at 12:19

## 2023-08-09 RX ADMIN — INSULIN LISPRO 1 UNITS: 100 INJECTION, SOLUTION INTRAVENOUS; SUBCUTANEOUS at 08:39

## 2023-08-09 RX ADMIN — METOPROLOL SUCCINATE 100 MG: 100 TABLET, EXTENDED RELEASE ORAL at 08:38

## 2023-08-09 RX ADMIN — LOSARTAN POTASSIUM 50 MG: 50 TABLET, FILM COATED ORAL at 08:38

## 2023-08-09 RX ADMIN — FLUTICASONE PROPIONATE 1 SPRAY: 50 SPRAY, METERED NASAL at 12:19

## 2023-08-09 RX ADMIN — FLECAINIDE ACETATE 100 MG: 50 TABLET ORAL at 08:38

## 2023-08-09 RX ADMIN — ACETAMINOPHEN 650 MG: 325 TABLET, FILM COATED ORAL at 07:44

## 2023-08-09 RX ADMIN — EZETIMIBE 10 MG: 10 TABLET ORAL at 08:38

## 2023-08-09 RX ADMIN — LORATADINE 10 MG: 10 TABLET ORAL at 11:27

## 2023-08-09 NOTE — PLAN OF CARE
Problem: Potential for Falls  Goal: Patient will remain free of falls  Description: INTERVENTIONS:  - Educate patient/family on patient safety including physical limitations  - Instruct patient to call for assistance with activity   - Consult OT/PT to assist with strengthening/mobility   - Keep Call bell within reach  - Keep bed low and locked with side rails adjusted as appropriate  - Keep care items and personal belongings within reach  - Initiate and maintain comfort rounds  - Make Fall Risk Sign visible to staff  - Offer Toileting every 2 Hours, in advance of need  - Initiate/Maintain alarm  - Obtain necessary fall risk management equipment:   - Apply yellow socks and bracelet for high fall risk patients  - Consider moving patient to room near nurses station  8/9/2023 1629 by Shakira Fritz RN  Outcome: Adequate for Discharge  8/9/2023 1135 by Shakira Fritz RN  Outcome: Progressing     Problem: MOBILITY - ADULT  Goal: Maintain or return to baseline ADL function  Description: INTERVENTIONS:  -  Assess patient's ability to carry out ADLs; assess patient's baseline for ADL function and identify physical deficits which impact ability to perform ADLs (bathing, care of mouth/teeth, toileting, grooming, dressing, etc.)  - Assess/evaluate cause of self-care deficits   - Assess range of motion  - Assess patient's mobility; develop plan if impaired  - Assess patient's need for assistive devices and provide as appropriate  - Encourage maximum independence but intervene and supervise when necessary  - Involve family in performance of ADLs  - Assess for home care needs following discharge   - Consider OT consult to assist with ADL evaluation and planning for discharge  - Provide patient education as appropriate  8/9/2023 1629 by Shakira Fritz RN  Outcome: Adequate for Discharge  8/9/2023 1135 by Shakira Fritz RN  Outcome: Progressing  Goal: Maintains/Returns to pre admission functional level  Description: INTERVENTIONS:  - Perform BMAT or MOVE assessment daily.   - Set and communicate daily mobility goal to care team and patient/family/caregiver. - Collaborate with rehabilitation services on mobility goals if consulted  - Perform Range of Motion 2 times a day. - Reposition patient every 2 hours.   - Dangle patient 2 times a day  - Stand patient 2 times a day  - Ambulate patient 2 times a day  - Out of bed to chair 2 times a day   - Out of bed for meals 2 times a day  - Out of bed for toileting  - Record patient progress and toleration of activity level   8/9/2023 1629 by Michael Ku RN  Outcome: Adequate for Discharge  8/9/2023 1135 by Michael Ku RN  Outcome: Progressing     Problem: CARDIOVASCULAR - ADULT  Goal: Maintains optimal cardiac output and hemodynamic stability  Description: INTERVENTIONS:  - Monitor I/O, vital signs and rhythm  - Monitor for S/S and trends of decreased cardiac output  - Administer and titrate ordered vasoactive medications to optimize hemodynamic stability  - Assess quality of pulses, skin color and temperature  - Assess for signs of decreased coronary artery perfusion  - Instruct patient to report change in severity of symptoms  8/9/2023 1629 by Michael Ku RN  Outcome: Adequate for Discharge  8/9/2023 1135 by Michael Ku RN  Outcome: Progressing  Goal: Absence of cardiac dysrhythmias or at baseline rhythm  Description: INTERVENTIONS:  - Continuous cardiac monitoring, vital signs, obtain 12 lead EKG if ordered  - Administer antiarrhythmic and heart rate control medications as ordered  - Monitor electrolytes and administer replacement therapy as ordered  8/9/2023 1629 by Michael Ku RN  Outcome: Adequate for Discharge  8/9/2023 1135 by Michael Ku RN  Outcome: Progressing     Problem: METABOLIC, FLUID AND ELECTROLYTES - ADULT  Goal: Glucose maintained within target range  Description: INTERVENTIONS:  - Monitor Blood Glucose as ordered  - Assess for signs and symptoms of hyperglycemia and hypoglycemia  - Administer ordered medications to maintain glucose within target range  - Assess nutritional intake and initiate nutrition service referral as needed  8/9/2023 1629 by Shayy Madrid RN  Outcome: Adequate for Discharge  8/9/2023 1135 by Shayy Madrid RN  Outcome: Progressing     Problem: MUSCULOSKELETAL - ADULT  Goal: Maintain or return mobility to safest level of function  Description: INTERVENTIONS:  - Assess patient's ability to carry out ADLs; assess patient's baseline for ADL function and identify physical deficits which impact ability to perform ADLs (bathing, care of mouth/teeth, toileting, grooming, dressing, etc.)  - Assess/evaluate cause of self-care deficits   - Assess range of motion  - Assess patient's mobility  - Assess patient's need for assistive devices and provide as appropriate  - Encourage maximum independence but intervene and supervise when necessary  - Involve family in performance of ADLs  - Assess for home care needs following discharge   - Consider OT consult to assist with ADL evaluation and planning for discharge  - Provide patient education as appropriate  8/9/2023 1629 by Shayy Madrid RN  Outcome: Adequate for Discharge  8/9/2023 1135 by Shayy Madrid RN  Outcome: Progressing  Goal: Maintain proper alignment of affected body part  Description: INTERVENTIONS:  - Support, maintain and protect limb and body alignment  - Provide patient/ family with appropriate education  8/9/2023 1629 by Shayy Madrid RN  Outcome: Adequate for Discharge  8/9/2023 1135 by Shayy Madrid RN  Outcome: Progressing     Problem: PHYSICAL THERAPY ADULT  Goal: Performs mobility at highest level of function for planned discharge setting. See evaluation for individualized goals.   Description: Treatment/Interventions: Therapeutic exercise, Endurance training, Patient/family training, Equipment eval/education, Gait training, Spoke to nursing, Spoke to case management, Spoke to MD     See flowsheet documentation for full assessment, interventions and recommendations.   Outcome: Adequate for Discharge

## 2023-08-09 NOTE — ASSESSMENT & PLAN NOTE
8/5/2023 patient continues to go in and out of afib with RVR, denies palpitations or anxiety  · Subsequently started on Cardizem drip, cardiology consulted and added flecainide  · Weaned off Cardizem gtt as of 0930 08/06/2023  · [id number]  · Likely in the setting postoperative trigger with somewhat superimposed uncontrolled pain  · Flecainide and Cardizem did not resolve pt's afib, cardioversion procedure was done and successful (8/7/23)    Plan  · As per cards, metoprolol 100mg, cardizem 30mg PO q6H, flecainide 100 mg q12H  · Continue monitoring on telemetry  · SHANNAN cardioversion successful, will follow up outpatient. · Continue taking medications given at discharge until outpatient follow up with cardiology.

## 2023-08-09 NOTE — PLAN OF CARE
Problem: Potential for Falls  Goal: Patient will remain free of falls  Description: INTERVENTIONS:  - Educate patient/family on patient safety including physical limitations  - Instruct patient to call for assistance with activity   - Consult OT/PT to assist with strengthening/mobility   - Keep Call bell within reach  - Keep bed low and locked with side rails adjusted as appropriate  - Keep care items and personal belongings within reach  - Initiate and maintain comfort rounds  - Make Fall Risk Sign visible to staff  - Offer Toileting every 2 Hours, in advance of need  - Initiate/Maintain alarm  - Obtain necessary fall risk management equipment:   - Apply yellow socks and bracelet for high fall risk patients  - Consider moving patient to room near nurses station  Outcome: Progressing     Problem: MOBILITY - ADULT  Goal: Maintain or return to baseline ADL function  Description: INTERVENTIONS:  -  Assess patient's ability to carry out ADLs; assess patient's baseline for ADL function and identify physical deficits which impact ability to perform ADLs (bathing, care of mouth/teeth, toileting, grooming, dressing, etc.)  - Assess/evaluate cause of self-care deficits   - Assess range of motion  - Assess patient's mobility; develop plan if impaired  - Assess patient's need for assistive devices and provide as appropriate  - Encourage maximum independence but intervene and supervise when necessary  - Involve family in performance of ADLs  - Assess for home care needs following discharge   - Consider OT consult to assist with ADL evaluation and planning for discharge  - Provide patient education as appropriate  Outcome: Progressing  Goal: Maintains/Returns to pre admission functional level  Description: INTERVENTIONS:  - Perform BMAT or MOVE assessment daily.   - Set and communicate daily mobility goal to care team and patient/family/caregiver.    - Collaborate with rehabilitation services on mobility goals if consulted  - Perform Range of Motion 2 times a day. - Reposition patient every 2 hours.   - Dangle patient 2 times a day  - Stand patient 2 times a day  - Ambulate patient 2 times a day  - Out of bed to chair 2 times a day   - Out of bed for meals 2 times a day  - Out of bed for toileting  - Record patient progress and toleration of activity level   Outcome: Progressing     Problem: CARDIOVASCULAR - ADULT  Goal: Maintains optimal cardiac output and hemodynamic stability  Description: INTERVENTIONS:  - Monitor I/O, vital signs and rhythm  - Monitor for S/S and trends of decreased cardiac output  - Administer and titrate ordered vasoactive medications to optimize hemodynamic stability  - Assess quality of pulses, skin color and temperature  - Assess for signs of decreased coronary artery perfusion  - Instruct patient to report change in severity of symptoms  Outcome: Progressing  Goal: Absence of cardiac dysrhythmias or at baseline rhythm  Description: INTERVENTIONS:  - Continuous cardiac monitoring, vital signs, obtain 12 lead EKG if ordered  - Administer antiarrhythmic and heart rate control medications as ordered  - Monitor electrolytes and administer replacement therapy as ordered  Outcome: Progressing     Problem: METABOLIC, FLUID AND ELECTROLYTES - ADULT  Goal: Glucose maintained within target range  Description: INTERVENTIONS:  - Monitor Blood Glucose as ordered  - Assess for signs and symptoms of hyperglycemia and hypoglycemia  - Administer ordered medications to maintain glucose within target range  - Assess nutritional intake and initiate nutrition service referral as needed  Outcome: Progressing     Problem: MUSCULOSKELETAL - ADULT  Goal: Maintain or return mobility to safest level of function  Description: INTERVENTIONS:  - Assess patient's ability to carry out ADLs; assess patient's baseline for ADL function and identify physical deficits which impact ability to perform ADLs (bathing, care of mouth/teeth, toileting, grooming, dressing, etc.)  - Assess/evaluate cause of self-care deficits   - Assess range of motion  - Assess patient's mobility  - Assess patient's need for assistive devices and provide as appropriate  - Encourage maximum independence but intervene and supervise when necessary  - Involve family in performance of ADLs  - Assess for home care needs following discharge   - Consider OT consult to assist with ADL evaluation and planning for discharge  - Provide patient education as appropriate  Outcome: Progressing  Goal: Maintain proper alignment of affected body part  Description: INTERVENTIONS:  - Support, maintain and protect limb and body alignment  - Provide patient/ family with appropriate education  Outcome: Progressing

## 2023-08-09 NOTE — ASSESSMENT & PLAN NOTE
Blood Pressure: 156/76  · Continue home meds amlodipine 5 mg once a day after dinner, losartan 50 milligrams twice daily  · Continue home dosage of Metoprolol at home, f/u with outpatient cardiology.

## 2023-08-09 NOTE — ASSESSMENT & PLAN NOTE
Lab Results   Component Value Date    EGFR 76 08/09/2023    EGFR 74 08/08/2023    EGFR 69 08/08/2023    CREATININE 0.75 08/09/2023    CREATININE 0.76 08/08/2023    CREATININE 0.81 08/08/2023     Continue current home treatment regimen.

## 2023-08-09 NOTE — ASSESSMENT & PLAN NOTE
Lab Results   Component Value Date    HGBA1C 6.6 (H) 07/03/2023       Recent Labs     08/08/23  1618 08/08/23  2140 08/09/23  0743 08/09/23  1119   POCGLU 135 148* 154* 204*       Blood Sugar Average: Last 72 hrs:  Not on medication  · Continue carbohydrate controlled diet at home  · Continue home diabetic medication upon discharge.

## 2023-08-09 NOTE — ASSESSMENT & PLAN NOTE
Recent Labs     08/08/23  0532 08/08/23  1616 08/09/23  1048   SODIUM 130* 131* 127*       · Continue home treatment regimen.

## 2023-08-09 NOTE — ASSESSMENT & PLAN NOTE
Recent Labs     08/08/23  0532 08/08/23  1616   HGB 9.5* 10.1*       · Continue current treatment regimen.

## 2023-08-09 NOTE — ASSESSMENT & PLAN NOTE
Wt Readings from Last 3 Encounters:   08/09/23 71.4 kg (157 lb 6.5 oz)   07/03/23 73.9 kg (163 lb)   06/26/23 73.1 kg (161 lb 2 oz)     Last echo June 2023 preop evaluation. LVEF 55%, G2DD  Pt not in CHF exacerbation t/o stay in hospital  Home regimen Lasix 20 mg as needed was recommended by Dr. David Brody cardiology on the outpatient setting. Will follow up after discharge.

## 2023-08-10 ENCOUNTER — APPOINTMENT (OUTPATIENT)
Dept: LAB | Facility: MEDICAL CENTER | Age: 78
End: 2023-08-10
Payer: MEDICARE

## 2023-08-10 DIAGNOSIS — Z71.89 COORDINATION OF COMPLEX CARE: Primary | ICD-10-CM

## 2023-08-10 DIAGNOSIS — I50.32 CHRONIC DIASTOLIC CHF (CONGESTIVE HEART FAILURE) (HCC): ICD-10-CM

## 2023-08-10 DIAGNOSIS — I48.19 PERSISTENT ATRIAL FIBRILLATION (HCC): ICD-10-CM

## 2023-08-10 LAB
ANION GAP SERPL CALCULATED.3IONS-SCNC: 8 MMOL/L
BNP SERPL-MCNC: 153 PG/ML (ref 0–100)
BUN SERPL-MCNC: 19 MG/DL (ref 5–25)
CALCIUM SERPL-MCNC: 9.9 MG/DL (ref 8.3–10.1)
CHLORIDE SERPL-SCNC: 96 MMOL/L (ref 96–108)
CO2 SERPL-SCNC: 25 MMOL/L (ref 21–32)
CREAT SERPL-MCNC: 1.15 MG/DL (ref 0.6–1.3)
GFR SERPL CREATININE-BSD FRML MDRD: 45 ML/MIN/1.73SQ M
GLUCOSE P FAST SERPL-MCNC: 227 MG/DL (ref 65–99)
POTASSIUM SERPL-SCNC: 4.6 MMOL/L (ref 3.5–5.3)
SODIUM SERPL-SCNC: 129 MMOL/L (ref 135–147)

## 2023-08-10 PROCEDURE — 36415 COLL VENOUS BLD VENIPUNCTURE: CPT | Performed by: INTERNAL MEDICINE

## 2023-08-10 PROCEDURE — 80048 BASIC METABOLIC PNL TOTAL CA: CPT | Performed by: INTERNAL MEDICINE

## 2023-08-10 PROCEDURE — 83880 ASSAY OF NATRIURETIC PEPTIDE: CPT

## 2023-08-10 RX ORDER — FUROSEMIDE 20 MG/1
TABLET ORAL
Qty: 20 TABLET | Refills: 3 | Status: SHIPPED | OUTPATIENT
Start: 2023-08-10

## 2023-08-11 ENCOUNTER — TRANSITIONAL CARE MANAGEMENT (OUTPATIENT)
Dept: FAMILY MEDICINE CLINIC | Facility: CLINIC | Age: 78
End: 2023-08-11

## 2023-08-11 ENCOUNTER — OFFICE VISIT (OUTPATIENT)
Dept: FAMILY MEDICINE CLINIC | Facility: CLINIC | Age: 78
End: 2023-08-11
Payer: MEDICARE

## 2023-08-11 VITALS
BODY MASS INDEX: 30.11 KG/M2 | DIASTOLIC BLOOD PRESSURE: 80 MMHG | HEART RATE: 68 BPM | SYSTOLIC BLOOD PRESSURE: 136 MMHG | OXYGEN SATURATION: 97 % | TEMPERATURE: 98.3 F | HEIGHT: 61 IN | WEIGHT: 159.5 LBS

## 2023-08-11 DIAGNOSIS — E04.1 THYROID NODULE: ICD-10-CM

## 2023-08-11 DIAGNOSIS — D47.2 MGUS (MONOCLONAL GAMMOPATHY OF UNKNOWN SIGNIFICANCE): ICD-10-CM

## 2023-08-11 DIAGNOSIS — Z96.652 S/P REVISION OF TOTAL KNEE, LEFT: ICD-10-CM

## 2023-08-11 DIAGNOSIS — I50.32 CHRONIC DIASTOLIC CHF (CONGESTIVE HEART FAILURE) (HCC): ICD-10-CM

## 2023-08-11 DIAGNOSIS — D32.9 MENINGIOMA (HCC): ICD-10-CM

## 2023-08-11 DIAGNOSIS — E87.1 HYPONATREMIA: ICD-10-CM

## 2023-08-11 DIAGNOSIS — I10 ESSENTIAL HYPERTENSION: ICD-10-CM

## 2023-08-11 DIAGNOSIS — R16.0 HEPATOMEGALY: ICD-10-CM

## 2023-08-11 DIAGNOSIS — I36.1 NONRHEUMATIC TRICUSPID VALVE REGURGITATION: ICD-10-CM

## 2023-08-11 DIAGNOSIS — N18.32 STAGE 3B CHRONIC KIDNEY DISEASE (HCC): ICD-10-CM

## 2023-08-11 DIAGNOSIS — I34.0 NONRHEUMATIC MITRAL VALVE REGURGITATION: ICD-10-CM

## 2023-08-11 DIAGNOSIS — I48.0 PAROXYSMAL ATRIAL FIBRILLATION (HCC): Primary | ICD-10-CM

## 2023-08-11 DIAGNOSIS — Z95.0 S/P PLACEMENT OF CARDIAC PACEMAKER: ICD-10-CM

## 2023-08-11 DIAGNOSIS — I35.1 NONRHEUMATIC AORTIC VALVE INSUFFICIENCY: ICD-10-CM

## 2023-08-11 DIAGNOSIS — D62 ACUTE BLOOD LOSS ANEMIA: ICD-10-CM

## 2023-08-11 PROCEDURE — 99496 TRANSJ CARE MGMT HIGH F2F 7D: CPT | Performed by: FAMILY MEDICINE

## 2023-08-11 RX ORDER — CETIRIZINE HYDROCHLORIDE 10 MG/1
10 CAPSULE, LIQUID FILLED ORAL DAILY
COMMUNITY

## 2023-08-11 NOTE — PROGRESS NOTES
TCM Call     Date and time call was made  2023  1:57 PM    Hospital care reviewed  Records reviewed    Patient was hospitialized at  [address]  Atrial fibrillation with rapid ventricular response     Date of Admission  23    Date of discharge  23    Diagnosis  A-fib with RVR ([address])    Disposition  Home    Were the patients medications reviewed and updated  Yes    Current Symptoms  None    Cough Severity  Mild    Weakness severity  Moderate    Fatigue severity  Moderate      TCM Call     Post hospital issues  None    Should patient be enrolled in anticoag monitoring? No    Scheduled for follow up? Yes    Patients specialists  Cardiologist    Cardiologist name  Dr. Geronimo Marquez     Did you obtain your prescribed medications  Yes    Do you need help managing your prescriptions or medications  No    Why type of assitance do you need  Daughter helps with medication adherence. Is transportation to your appointment needed  No    Specify why  No longer driving. I have advised the patient to call PCP with any new or worsening symptoms  [place], [address] members    Support System  Family; Children    The type of support provided  Emotional; Physical    Do you have social support  Yes, as much as I need    Are you recieving any outpatient services  No    What type of services  [place]    Are you recieving home care services  No    Types of home care services  Home PT; Nurse visit    Are you using any community resources  No    Current waiver services  No    Have you fallen in the last 12 months  No    How many times  Once    Interperter language line needed  No    Counseling  Patient; Family    Counseling topics  Prognosis;  Activities of daily living                Name: Cheral Brittle      : 1945      MRN: [id number]  Encounter Provider: Derrick Pereira MD  Encounter Date: 2023   Encounter department: [facility]    Assessment & Plan     1. A-fib with RVR ([address])    2. Hyponatremia  -     Comprehensive metabolic panel    3. Acute blood loss anemia  -     CBC and differential    4. Thyroid nodule  -     US thyroid; Future; Expected date: 08/11/2023  -     US thyroid; Future; Expected date: 08/11/2023    5. Hepatomegaly  -     US elastography/UGAP; Future; Expected date: 08/11/2023    6. Chronic diastolic CHF (congestive heart failure) ([address])    7. Essential hypertension    8. Stage 3b chronic kidney disease ([address])    9. Nonrheumatic mitral valve regurgitation    10. Nonrheumatic tricuspid valve regurgitation    11. Nonrheumatic aortic valve insufficiency    12. Meningioma ([address])    13. MGUS (monoclonal gammopathy of unknown significance)    14. S/P revision of total knee, left    15. S/P placement of cardiac pacemaker    D/c Amlodipine. Monitor BP. If BP control needed dose of Cardizem CD can be increased. Repeat labs in several weeks. Schedule thyroid u/s and u/s elastography once she is more mobile. Continue with home PT. Follow up with Ortho and Cardiology. OV 4 weeks     BMI Counseling: Body mass index is 30.14 kg/m². The BMI is above normal. Nutrition recommendations include consuming healthier snacks, moderation in carbohydrate intake, reducing intake of saturated fat and trans fat and reducing intake of cholesterol. Subjective     TCM 8/4/2023 to 8/9/2023. Diagnosis atrial fibrillation with rapid ventricular response. 2 prior ablations with pacemaker. Chronic diastolic CHF, acute blood loss secondary to recent left TKR and hyponatremia Status post revision of failed left total knee replacement 07/2023. Patient presented to the hospital with fatigue and shortness of breath. CTA no acute PE. Mild heterogeneous liver with slight lobulation along the left lobe. Incidental thyroid nodule. Venous Dopplers lower extremities no DVT. Abdominal ultrasound hepatomegaly. No evidence of cirrhosis or liver mass.   6 mm gallbladder polyp. Troponin negative. Elevated BNP at 651. TSH 2.439. Admission electrolytes normal for sodium 132. LFTs normal except for total bilirubin 1.07. Hemoglobin 10.0. Patient was treated with IV Cardizem and started on oral Flecainide. She ultimately underwent cardioversion. Preprocedure SHANNAN Normal left ventricular systolic function. EF 65%. Wall motion normal.  Right ventricle normal.  Left atrium moderately dilated. No thrombus. No PFO. Left atrial appendage dilated. Normal function. No thrombus. Moderate TR. She was discharged on Flecainide 100 mg BID, Cardizem  mg daily and Eliquis 5 mg BID. She has been using PRN Tylenol for pain. Type 2 DM diet controlled. 07/2023 A1c 6.6. 01/2023  Urine micro albumin normal at 9.2. on ARB. no DPN. Current with eye exam. Hypertension/CKD stage 3 blood pressures have been stable on Losartan 50 mg BID, Metoprolol  mg BID,  Amlodipine 5 mg/day and Cardizem  mg/day. 08/2023 creatinine 1.15. GFR  45. Electrolytes normal except for Na+ 129. Random . Corrected Na+ 132. Hgb 10.1.  06/2023 creatinine 0.88. GFR 63. Electrolytes normal. Hgb 12.9.. Hyperlipidemia on Zetia 10 mg daily. 06/2023 Cholesterol 163. Triglycerides 68. HDL 53. LDL 96. LFTs normal.  06/2023 Echo  Left Ventricle: Left ventricular cavity size is normal. Wall thickness is normal. The left ventricular ejection fraction is 55%. Systolic function is normal. Wall motion is normal. Diastolic function is moderately abnormal, consistent with grade II (pseudonormal) relaxation. •  Right Ventricle: Right ventricular cavity size is normal. Systolic function is normal. A pacer wire is present. •  Left Atrium: The atrium is mildly dilated. •  Aortic Valve: There is mild regurgitation. •  Mitral Valve: There is moderate regurgitation. •  Tricuspid Valve: There is mild to moderate regurgitation.  The right ventricular systolic pressure is moderately to severely elevated. The estimated right ventricular systolic pressure is 20.92 mmHg. Lab Results   Component Value Date    SODIUM 129 (L) 08/10/2023    K 4.6 08/10/2023    CL 96 08/10/2023    CO2 25 08/10/2023    BUN 19 08/10/2023    CREATININE 1.15 08/10/2023    GLUC 191 (H) 08/09/2023    CALCIUM 9.9 08/10/2023     Lab Results   Component Value Date    WBC 7.82 08/08/2023    HGB 10.1 (L) 08/08/2023    HCT 30.2 (L) 08/08/2023    MCV 94 08/08/2023     08/08/2023     Lab Results   Component Value Date    ALT 14 08/08/2023    AST 14 08/08/2023    ALKPHOS 60 08/08/2023    BILITOT 0.78 05/06/2015           Lab Results   Component Value Date    HGBA1C 6.6 (H) 07/03/2023         Review of Systems   Constitutional: Positive for fatigue. Negative for appetite change, chills, fever and unexpected weight change. HENT: Positive for hearing loss (bilateral hearing aids ). Negative for congestion, ear pain, rhinorrhea, sore throat and trouble swallowing. Eyes: Negative for visual disturbance. Respiratory: Negative for cough, shortness of breath and wheezing. Cardiovascular: Negative for chest pain, palpitations and leg swelling. See HPI. 06/2022  Admission for CHF, hyponatremia with altered mental status. Chest x-ray shows small bilateral effusions. . Sodium prior to admission 125. On repeat 128 in the setting of volume overload. Patient was treated with IV diuresis with symptomatic improvement. Discharge sodium 136. Mental status improved back to baseline. Prior  cardio versions. 03/2021 admission for atrial fibrillation status post ablation procedure. Pre procedure episode of bradycardia with QT prolongation and torsades successfully defibrillated. Subsequent pacemaker placed. Repeat ablation 08/2022 at [facility]. Gastrointestinal: Negative for abdominal pain, blood in stool, constipation, diarrhea, nausea and vomiting. See HPI. Colonoscopy 04/2018 Two benign polyps.   Mild diverticulosis left-sided colon. Endocrine: Negative for polydipsia and polyuria. See HPI.  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 09/2018 DEXA scan T-score -1.5 left femoral neck. On vitamin D supplement    Lab Results       Component                Value               Date                       [id number]             2.439               08/04/2023                                                          Genitourinary: Negative for difficulty urinating. Musculoskeletal: Positive for arthralgias. Negative for myalgias. See HPI. OA shoulders. 07/2019 x-rays right shoulder mild degenerative arthritis right AC joint. Left shoulder mild degenerative changes left AC joint. 08/2019 bilateral shoulder intra-articular steroid injections via fluoroscopy with symptomatic relief. S/p bilateral TKR. Skin: Negative for rash. Neurological: Positive for tremors. Negative for dizziness and headaches. 04/2022 MRI brain 4.2 x 1.7 x 3.0 cm fairly homogeneously enhancing, extra-axial mass within the inferior lateral aspect of the left anterior cranial fossa. Mild adjacent dural thickening and enhancement is seen extending inferiorly into the middle cranial fossa and superiorly into the dura of the anterior frontal vertex. Findings are most suggestive of meningioma. Neurosurgical [facility] at [facility] 05/2022. Second opinion at [facility]. RLS on Requip. 04/2021 neurology evaluation for tremor suspected benign essential  tremor. She is on a Beta-blocker and Gabapentin. Family history + essential tremor brother. Hematological: Negative for adenopathy. Does not bruise/bleed easily. Monoclonal gammopathy. 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  CBC this month post op after L TKR.        Lab Results       Component                Value               Date WBC                      7.82                08/08/2023                 HGB                      10.1 (L)            08/08/2023                 HCT                      30.2 (L)            08/08/2023                 MCV                      94                  08/08/2023                 PLT                      350                 08/08/2023                      Hemoglobin       Date                     Value               Ref Range           Status                08/08/2023               10.1 (L)            11.5 - 15.4 g/*     Final                 08/08/2023               9.5 (L)             11.5 - 15.4 g/*     Final                 08/06/2023               11.5                11.5 - 15.4 g/*     Final                 05/06/2015               13.3                11.5 - 15.4 g/*     Final                 02/16/2015               13.9                11.5 - 15.4 g/*     Final                 07/10/2014               13.5                11.5 - 15.4 g/*     Final                       IGG       Date                     Value               Ref Range           Status                01/31/2023               905.0               700.0 - 1,600. *     Final                 07/11/2022               960.0               700.0 - 1,600. *     Final                 10/27/2021               793.0               700.0-1,600.0 *     Final                        Lab Results       Component                Value               Date                       [id number]               732                 07/01/2022                          MMA elevated at 489   Psychiatric/Behavioral: Negative for dysphoric mood and sleep disturbance. The patient is nervous/anxious. Stable on low dose Duloxetine 30 mg/day.   Chronic insomnia on prn Zolpidem 10 mg                 Past Medical History:   Diagnosis Date   • Actinic keratosis     last assessed - [id number]   • Arthritis    • Atrial fibrillation with rapid ventricular response ([address]) last assessed - 26Apr2016   • Basal cell carcinoma    • Benign colon polyp     last assessed - 27Apr2015   • CHF (congestive heart failure) ([address]) 06/2022   • Disease of thyroid gland    • Effusion of knee joint right     Resolved - [id number]   • Esophageal reflux    • Fibromyalgia     last assessed - 27Dec2017   • Fibromyalgia, primary    • GERD (gastroesophageal reflux disease)    • Hypertension    • Palpitations     last assessed - 30Apr2013   • Peroneal tendonitis, right     last assessed - 02Oct2013   • Right lumbar radiculopathy 03/17/2016   • Thyroid cancer ([address])    • Thyroid nodule    • Trochanteric bursitis of left hip 03/09/2018     Past Surgical History:   Procedure Laterality Date   • CARDIAC ELECTROPHYSIOLOGY STUDY AND ABLATION  08/2022   • CATARACT EXTRACTION Bilateral    • COLONOSCOPY  03/2018   • COLONOSCOPY W/ POLYPECTOMY  2021    repeat in 5 years   • EYE SURGERY     • HYSTERECTOMY     • JOINT REPLACEMENT Left     knee   • MI NEUROPLASTY &/TRANSPOS MEDIAN NRV CARPAL TUNNE Right 11/14/2019    Procedure: RELEASE CARPAL TUNNEL;  Surgeon: Lluvia Saldivar MD;  Location: [facility];  Service: Orthopedics   • MI TOTAL THYROID LOBECTOMY UNI W/WO ISTHMUSECTOMY Left 12/16/2020    Procedure: Left THYROID LOBECTOMY;  Surgeon: Kylie Gaffney MD;  Location: [facility];  Service: ENT   • RECTAL POLYPECTOMY     • REPLACEMENT TOTAL KNEE Left     last assessed - 27Apr2015   • TONSILLECTOMY     • TOTAL ABDOMINAL HYSTERECTOMY     • TUBAL LIGATION     • US GUIDED THYROID BIOPSY  10/14/2020     Family History   Problem Relation Age of Onset   • Heart disease Mother    • Diabetes Mother    • Heart disease Father    • Coronary artery disease Father    • Stroke Father         cerebrovascular accident   • Heart attack Father         myocardial infarction   • Sudden death Father         scd   • Other Family         Back disorder   • Coronary artery disease Family    • Neuropathy Family    • Osteoporosis Family    • No Known Problems Daughter    • No Known Problems Maternal Grandmother    • No Known Problems Maternal Grandfather    • No Known Problems Paternal Grandmother    • No Known Problems Paternal Grandfather    • Cancer Maternal Uncle    • Breast cancer Maternal Aunt 72   • No Known Problems Son    • No Known Problems Maternal Aunt    • No Known Problems Maternal Aunt    • No Known Problems Maternal Aunt    • No Known Problems Paternal Aunt    • No Known Problems Paternal Aunt    • Anuerysm Neg Hx    • Clotting disorder Neg Hx    • Arrhythmia Neg Hx    • Heart failure Neg Hx      Social History     Socioeconomic History   • Marital status:      Spouse name: None   • Number of children: None   • Years of education: None   • Highest education level: None   Occupational History   • None   Tobacco Use   • Smoking status: Never   • Smokeless tobacco: Never   Vaping Use   • Vaping Use: Never used   Substance and Sexual Activity   • Alcohol use: Not Currently   • Drug use: Never   • Sexual activity: Not Currently   Other Topics Concern   • None   Social History Narrative   • None     Social Determinants of Health     Financial Resource Strain: Low Risk  (9/19/2022)    Overall Financial Resource Strain (CARDIA)    • Difficulty of Paying Living Expenses: Not hard at all   Food Insecurity: Not on file   Transportation Needs: No Transportation Needs (9/19/2022)    PRAPARE - Transportation    • Lack of Transportation (Medical): No    • Lack of Transportation (Non-Medical):  No   Physical Activity: Not on file   Stress: Not on file   Social Connections: Not on file   Intimate Partner Violence: Not on file   Housing Stability: Not on file     Current Outpatient Medications on File Prior to Visit   Medication Sig   • amLODIPine (NORVASC) 5 mg tablet Take 1 tablet (5 mg total) by mouth daily   • apixaban (ELIQUIS) 5 mg Take 1 tablet (5 mg total) by mouth 2 (two) times a day   • Arthritis Pain Relief 650 MG CR tablet Take 650 mg by mouth 3 (three) times a day   • ascorbic acid (VITAMIN C) 500 mg tablet Take 500 mg by mouth daily    • Cetirizine HCl (ZyrTEC Allergy) 10 MG CAPS Take 10 mg by mouth in the morning   • Cholecalciferol 50 MCG (2000 UT) CAPS Take 2,000 Units by mouth 2 (two) times a day   • Chromium Picolinate (CHROMIUM PICOLATE PO) Take 1 tablet by mouth daily   • diltiazem (CARDIZEM CD) 120 mg 24 hr capsule Take 1 capsule (120 mg total) by mouth daily   • DULoxetine (CYMBALTA) 30 mg delayed release capsule take 1 capsule by mouth once daily   • ezetimibe (ZETIA) 10 mg tablet take 1 tablet by mouth once daily   • famotidine (PEPCID) 20 mg tablet Take 20 mg by mouth every other day     • flecainide (TAMBOCOR) 100 mg tablet Take 1 tablet (100 mg total) by mouth 2 (two) times a day   • furosemide (LASIX) 20 mg tablet take 2 tablet by mouth daily if needed for shortness of breath WEIGHT GAIN 3 LBS IN 1 DAY OR LOWER LEG SWELLING   • gabapentin (NEURONTIN) 300 mg capsule Take 1 capsule (300 mg total) by mouth daily at bedtime   • ipratropium (ATROVENT) 0.03 % nasal spray 2 sprays into each nostril 3 (three) times a day Begin once per day, then progress to twice per day. Progress to three times a day as tolerated.    • lidocaine (XYLOCAINE) 5 % ointment Apply topically as needed for mild pain   • losartan (COZAAR) 50 mg tablet Take 1 tablet (50 mg total) by mouth 2 (two) times a day   • metoprolol succinate (TOPROL-XL) 25 mg 24 hr tablet Take 4 tablets (100 mg total) by mouth every 12 (twelve) hours   • rOPINIRole (REQUIP) 0.5 mg tablet Take 1 tablet (0.5 mg total) by mouth daily at bedtime   • TURMERIC PO Take 1 capsule by mouth 2 (two) times a day   • zolpidem (AMBIEN) 10 mg tablet Take 1 tablet (10 mg total) by mouth daily at bedtime as needed for sleep   • Nel-diamond 8.6 MG tablet Take 8.6 mg by mouth 2 (two) times a day as needed (Patient not taking: Reported on 8/11/2023)   • hydrocortisone 1 % cream Apply topically 4 (four) times a day as needed for irritation or rash (Patient not taking: Reported on 8/11/2023)   • oxyCODONE (ROXICODONE) 5 immediate release tablet Take 5 mg by mouth every 4 (four) hours as needed (Patient not taking: Reported on 8/11/2023)   • pantoprazole (PROTONIX) 20 mg tablet Take 1 tablet by mouth daily before breakfast (Patient not taking: Reported on 8/11/2023)   • senna (SENOKOT) 8.6 MG tablet Take 17.2 mg by mouth 2 (two) times a day as needed (Patient not taking: Reported on 8/11/2023)   • traMADol (ULTRAM) 50 mg tablet Take 1 tablet (50 mg total) by mouth 2 (two) times a day as needed for moderate pain (Patient not taking: Reported on 8/11/2023)     Allergies   Allergen Reactions   • Sotalol Other (See Comments)     Prolonged QTc leading to torsades de pointes    • Penicillins Other (See Comments)     As a child calcium deposit in the arm    • Ace Inhibitors GI Intolerance     Did feel well on it   • Omeprazole Abdominal Pain, Rash and Vomiting     stomach pain, vomiting, rash     Immunization History   Administered Date(s) Administered   • COVID-19 MODERNA VACC 0.5 ML IM 01/27/2021, 02/24/2021   • INFLUENZA 12/23/2015, 11/07/2016, 10/18/2017, 12/04/2018, 09/19/2022   • Influenza Split High Dose Preservative Free IM 10/01/2014, 12/23/2015, 11/07/2016, 10/18/2017   • Influenza, high dose seasonal 0.7 mL 12/04/2018, 09/26/2019, 09/08/2020, 11/03/2021, 09/19/2022   • Influenza, seasonal, injectable 10/16/2012   • Pneumococcal Conjugate 13-Valent 04/27/2015   • Pneumococcal Polysaccharide PPV23 03/29/2022       Objective     /80 (BP Location: Left arm, Patient Position: Sitting, Cuff Size: Standard)   Pulse 68   Temp 98.3 °F (36.8 °C)   Ht 5' 1" (1.549 m)   Wt 72.3 kg (159 lb 8 oz)   LMP 02/01/1990 (Within Weeks)   SpO2 97%   BMI 30.14 kg/m²       BP Readings from Last 3 Encounters:   08/11/23 136/80   08/09/23 156/76   07/03/23 128/74     Physical Exam  Vitals and nursing note reviewed.    Constitutional: General: She is not in acute distress. Appearance: She is well-developed. HENT:      Right Ear: Tympanic membrane and ear canal normal.      Left Ear: Tympanic membrane and ear canal normal.   Eyes:      General: No scleral icterus. Extraocular Movements: Extraocular movements intact. Conjunctiva/sclera: Conjunctivae normal.      Pupils: Pupils are equal, round, and reactive to light. Neck:      Thyroid: No thyroid mass or thyromegaly. Vascular: No carotid bruit or JVD. Trachea: No tracheal deviation. Cardiovascular:      Rate and Rhythm: Normal rate and regular rhythm. Heart sounds: Normal heart sounds. No murmur heard. No gallop. Comments: No calf tenderness or Homans sign   Pulmonary:      Effort: Pulmonary effort is normal. No respiratory distress. Breath sounds: Normal breath sounds. No wheezing or rales. Abdominal:      General: There is no abdominal bruit. Palpations: There is no hepatomegaly or splenomegaly. Musculoskeletal:      Left knee: Swelling present. Decreased range of motion. Right lower le+ Edema present. Left lower le+ Edema present. Comments: Well healed incision L knee no redness. No drainage. Lacks 5 degrees full extension. Flexion 45 degrees    Lymphadenopathy:      Cervical: No cervical adenopathy. Upper Body:      Right upper body: No supraclavicular adenopathy. Left upper body: No supraclavicular adenopathy. Skin:     Findings: No rash. Nails: There is no clubbing. Neurological:      General: No focal deficit present. Mental Status: She is alert and oriented to person, place, and time.    Psychiatric:         Mood and Affect: Mood normal.       Caryn Knight MD

## 2023-08-13 PROBLEM — Z96.652 S/P REVISION OF TOTAL KNEE, LEFT: Status: ACTIVE | Noted: 2023-07-31

## 2023-08-13 LAB
ATRIAL RATE: 100 BPM
QRS AXIS: 28 DEGREES
QRSD INTERVAL: 112 MS
QT INTERVAL: 394 MS
QTC INTERVAL: 479 MS
T WAVE AXIS: -48 DEGREES
VENTRICULAR RATE: 89 BPM

## 2023-08-13 PROCEDURE — 93010 ELECTROCARDIOGRAM REPORT: CPT | Performed by: INTERNAL MEDICINE

## 2023-08-14 ENCOUNTER — PATIENT OUTREACH (OUTPATIENT)
Dept: FAMILY MEDICINE CLINIC | Facility: CLINIC | Age: 78
End: 2023-08-14

## 2023-08-14 NOTE — PROGRESS NOTES
Spoke with Martina Richmond reports she is doing okay. To start with [facility] home health tomorrow. Daughter helps her get her medications and groceries. No questions or concerns.  Declines further outreach at this time

## 2023-08-24 NOTE — PROGRESS NOTES
Cardiology Follow Up    Chato Macario  1945  [id number]  [facility] [address]  [phone]    1. Paroxysmal atrial fibrillation (HCC)  POCT ECG      2. Pacemaker  diltiazem (CARDIZEM CD) 120 mg 24 hr capsule    flecainide (TAMBOCOR) 100 mg tablet      3. Hypertension, unspecified type        4. Chronic diastolic heart failure ([address])        5. Hyponatremia        6. Mixed hyperlipidemia          Ms Denise Pop was admitted to [address] [place] [address] on  8/04/23 - 8/09/23 with atrial fibrillation with RVR. Gen Luke presented to the ED with a 2 day hsitory of fatigue, exeritional dyspnea with palpitations. 7/31/23 sp left knee replacement revision. She was treated with IV Cardizem and maintained on Metoprolol and Eliquis. Cardiology consulted and felt AF episode from catecholamine surge. She was switched to Flecainide. 8/07/23 Gen Luke underwent SHANNAN DCCV with a single biphasic shock 300J which was successful to NSR. She was advised to follow up with cardiology. Chronic hyponatremia, sodium at discharge 130. Discharge weight 157 pounds. Gen Luke presents to our office for a follow up visit. She denies CP, lightheadedness or dizziness. Gen Luke admits to occasional short bursts of palpitations, nothing persistent. She is undergoing PT for her left knee. Medical History   Primary Cardiologist Dr Maribeth Winslow   Paroxysmal atrial fibrillation [id number], on Eliquis 5mg BID for stroke prevention,. 3/12/21 sp cryoablation with pulmonary vein isolation, DCCV from AF to NSR.   Placed on Flecainide post procedure  8/02/22 sp atrial fibrillation ablation   Pulmonary vein isolation, rare isolation of right vein, and a straight modification with posterior wall modification.  Successful CTI flutter ablation with by directional block.  Elevated LA pressure and moderate 60-70%.  by Dr Krystle Mckenzie   On 1/06/22 Ms Brandee Kimbrough underwent DCCV single biphasic shock 200J to NSR.   ,  SSS,  3/15/21 Sp MDT dual chamber PPM insertion ,   Hypertension  Chronic HFpEF LVEF 55% by TTE 1/04/21 dry weight ? 159 pounds  Hyponatremia sodium 130 .    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  sp Lobectomy  10/27/21 TSH 0.975  2016 Sleep study negative for sleep apnea   Colonoscopy 8/2021 7/31/23 sp left knee replacement revision  11/09/21 PFT"s : FEV1/FVC ratio 81%, FVC 2.67 L 103% predicted, FEV1 2.18 L 105% predicted,   Mildly reduced diffusion capacity.   HgbA1C 6.6 on 7/03/23      Admitted to [address] [place] [address] 6/13 - 6/17/22 with acute heart failure.  discharge weight 160 pounds     Allergies: Sotalol- prolonged QT leading to  torsades de pointes      10/05/22 Rx Nuclear stress test negative for ischemia      4/26/22 MRI of the brain showed  Extra-axial mass within the inferior lateral aspect of the left anterior cranial fossa.  Mild adjacent dural thickening and enhancement seen extending inferiorly into the middle cranial fossa and superiorly into the dura of the anterior frontal vertex.  Findings suggestive of meningioma     6/15/22 TTE:  Left ventricle cavity size normal wall thickness increased LVEF 04% systolic function normal wall motion normal.  Right ventricular cavity size normal systolic function normal.  Left atrium dilated.     Patient Active Problem List   Diagnosis   • Essential hypertension   • Allergic rhinitis   • Fibromyalgia   • Hyperlipidemia   • Insomnia   • Lumbar spondylosis   • Lumbar stenosis   • Osteoarthrosis, hand   • Osteoarthritis of knee   • Osteopenia   • A-fib with RVR ([facility])   • Peripheral neuropathy   • Restless legs syndrome   • TMJ syndrome   • Vitamin D deficiency   • Osteoarthritis of shoulder   • Carpal tunnel syndrome on right   • Arthritis of both acromioclavicular joints   • Chronic rhinitis   • Chronic diastolic CHF (congestive heart failure) (HCC)   • Malignant neoplasm of thyroid gland (HCC)   • Current use of long term anticoagulation   • S/P placement of cardiac pacemaker   • Tremors of nervous system   • Hx of diplopia   • Hurthle cell adenoma of thyroid   • MGUS (monoclonal gammopathy of unknown significance)   • Vasomotor rhinitis   • Chronic tension-type headache, not intractable   • Stage 3b chronic kidney disease ([address])   • Meningioma (HCC)   • Hyponatremia   • Pulmonary hypertension (HCC)   • Type 2 diabetes mellitus without complication, without long-term current use of insulin (HCC)   • Continuous opioid dependence (HCC)   • Nonrheumatic mitral valve regurgitation   • Nonrheumatic aortic valve insufficiency   • Nonrheumatic tricuspid valve regurgitation   • Anemia   • Abnormal CT of the chest   • S/P revision of total knee, left     Past Medical History:   Diagnosis Date   • Actinic keratosis     last assessed - [id number]   • Arthritis    • Atrial fibrillation with rapid ventricular response ([address])     last assessed - 26Apr2016   • Basal cell carcinoma    • Benign colon polyp     last assessed - 27Apr2015   • CHF (congestive heart failure) ([address]) 06/2022   • Disease of thyroid gland    • Effusion of knee joint right     Resolved - [id number]   • Esophageal reflux    • Fibromyalgia     last assessed - [id number]   • Fibromyalgia, primary    • GERD (gastroesophageal reflux disease)    • Hypertension    • Palpitations     last assessed - 30Apr2013   • Peroneal tendonitis, right     last assessed - 02Oct2013   • Right lumbar radiculopathy 03/17/2016   • Thyroid cancer ([address])    • Thyroid nodule    • Trochanteric bursitis of left hip 03/09/2018     Social History     Socioeconomic History   • Marital status:       Spouse name: Not on file   • Number of children: Not on file   • Years of education: Not on file   • Highest education level: Not on file Occupational History   • Not on file   Tobacco Use   • Smoking status: Never   • Smokeless tobacco: Never   Vaping Use   • Vaping Use: Never used   Substance and Sexual Activity   • Alcohol use: Not Currently   • Drug use: Never   • Sexual activity: Not Currently   Other Topics Concern   • Not on file   Social History Narrative   • Not on file     Social Determinants of Health     Financial Resource Strain: Low Risk  (9/19/2022)    Overall Financial Resource Strain (CARDIA)    • Difficulty of Paying Living Expenses: Not hard at all   Food Insecurity: Not on file   Transportation Needs: No Transportation Needs (9/19/2022)    PRAPARE - Transportation    • Lack of Transportation (Medical): No    • Lack of Transportation (Non-Medical):  No   Physical Activity: Not on file   Stress: Not on file   Social Connections: Not on file   Intimate Partner Violence: Not on file   Housing Stability: Not on file      Family History   Problem Relation Age of Onset   • Heart disease Mother    • Diabetes Mother    • Heart disease Father    • Coronary artery disease Father    • Stroke Father         cerebrovascular accident   • Heart attack Father         myocardial infarction   • Sudden death Father         scd   • Other Family         Back disorder   • Coronary artery disease Family    • Neuropathy Family    • Osteoporosis Family    • No Known Problems Daughter    • No Known Problems Maternal Grandmother    • No Known Problems Maternal Grandfather    • No Known Problems Paternal Grandmother    • No Known Problems Paternal Grandfather    • Cancer Maternal Uncle    • Breast cancer Maternal Aunt 72   • No Known Problems Son    • No Known Problems Maternal Aunt    • No Known Problems Maternal Aunt    • No Known Problems Maternal Aunt    • No Known Problems Paternal Aunt    • No Known Problems Paternal Aunt    • Anuerysm Neg Hx    • Clotting disorder Neg Hx    • Arrhythmia Neg Hx    • Heart failure Neg Hx      Past Surgical History: Procedure Laterality Date   • CARDIAC ELECTROPHYSIOLOGY STUDY AND ABLATION  08/2022   • CATARACT EXTRACTION Bilateral    • COLONOSCOPY  03/2018   • COLONOSCOPY W/ POLYPECTOMY  2021    repeat in 5 years   • EYE SURGERY     • HYSTERECTOMY     • JOINT REPLACEMENT Left     knee   • DE NEUROPLASTY &/TRANSPOS MEDIAN NRV CARPAL TUNNE Right 11/14/2019    Procedure: RELEASE CARPAL TUNNEL;  Surgeon: Petty Eden MD;  Location: [facility];  Service: Orthopedics   • DE TOTAL THYROID LOBECTOMY UNI W/WO ISTHMUSECTOMY Left 12/16/2020    Procedure: Left THYROID LOBECTOMY;  Surgeon: Judy Carson MD;  Location: [facility];  Service: ENT   • RECTAL POLYPECTOMY     • REPLACEMENT TOTAL KNEE Left     last assessed - 27Apr2015   • TONSILLECTOMY     • TOTAL ABDOMINAL HYSTERECTOMY     • TUBAL LIGATION     • US GUIDED THYROID BIOPSY  10/14/2020       Current Outpatient Medications:   •  amLODIPine (NORVASC) 5 mg tablet, Take 1 tablet (5 mg total) by mouth daily, Disp: 90 tablet, Rfl: 3  •  apixaban (ELIQUIS) 5 mg, Take 1 tablet (5 mg total) by mouth 2 (two) times a day, Disp: 60 tablet, Rfl: 5  •  Arthritis Pain Relief 650 MG CR tablet, Take 650 mg by mouth 3 (three) times a day, Disp: , Rfl:   •  ascorbic acid (VITAMIN C) 500 mg tablet, Take 500 mg by mouth daily , Disp: , Rfl:   •  Cetirizine HCl (ZyrTEC Allergy) 10 MG CAPS, Take 10 mg by mouth in the morning, Disp: , Rfl:   •  Cholecalciferol 50 MCG (2000 UT) CAPS, Take 2,000 Units by mouth 2 (two) times a day, Disp: , Rfl:   •  Chromium Picolinate (CHROMIUM PICOLATE PO), Take 1 tablet by mouth daily, Disp: , Rfl:   •  diltiazem (CARDIZEM CD) 120 mg 24 hr capsule, Take 1 capsule (120 mg total) by mouth daily, Disp: 30 capsule, Rfl: 0  •  DULoxetine (CYMBALTA) 30 mg delayed release capsule, take 1 capsule by mouth once daily, Disp: 30 capsule, Rfl: 2  •  ezetimibe (ZETIA) 10 mg tablet, take 1 tablet by mouth once daily, Disp: 90 tablet, Rfl: 3  •  famotidine (PEPCID) 20 mg tablet, Take 20 mg by mouth every other day  , Disp: , Rfl:   •  flecainide (TAMBOCOR) 100 mg tablet, Take 1 tablet (100 mg total) by mouth 2 (two) times a day, Disp: 60 tablet, Rfl: 0  •  furosemide (LASIX) 20 mg tablet, take 2 tablet by mouth daily if needed for shortness of breath WEIGHT GAIN 3 LBS IN 1 DAY OR LOWER LEG SWELLING, Disp: 20 tablet, Rfl: 3  •  gabapentin (NEURONTIN) 300 mg capsule, Take 1 capsule (300 mg total) by mouth daily at bedtime, Disp: 30 capsule, Rfl: 5  •  Nel-diamond 8.6 MG tablet, Take 8.6 mg by mouth 2 (two) times a day as needed (Patient not taking: Reported on 8/11/2023), Disp: , Rfl:   •  hydrocortisone 1 % cream, Apply topically 4 (four) times a day as needed for irritation or rash (Patient not taking: Reported on 8/11/2023), Disp: 14 g, Rfl: 0  •  ipratropium (ATROVENT) 0.03 % nasal spray, 2 sprays into each nostril 3 (three) times a day Begin once per day, then progress to twice per day. Progress to three times a day as tolerated. , Disp: 90 mL, Rfl: 3  •  lidocaine (XYLOCAINE) 5 % ointment, Apply topically as needed for mild pain, Disp: 35.44 g, Rfl: 0  •  losartan (COZAAR) 50 mg tablet, Take 1 tablet (50 mg total) by mouth 2 (two) times a day, Disp: 180 tablet, Rfl: 3  •  metoprolol succinate (TOPROL-XL) 25 mg 24 hr tablet, Take 4 tablets (100 mg total) by mouth every 12 (twelve) hours, Disp: 240 tablet, Rfl: 1  •  oxyCODONE (ROXICODONE) 5 immediate release tablet, Take 5 mg by mouth every 4 (four) hours as needed (Patient not taking: Reported on 8/11/2023), Disp: , Rfl:   •  pantoprazole (PROTONIX) 20 mg tablet, Take 1 tablet by mouth daily before breakfast (Patient not taking: Reported on 8/11/2023), Disp: , Rfl:   •  rOPINIRole (REQUIP) 0.5 mg tablet, Take 1 tablet (0.5 mg total) by mouth daily at bedtime, Disp: 90 tablet, Rfl: 3  •  senna (SENOKOT) 8.6 MG tablet, Take 17.2 mg by mouth 2 (two) times a day as needed (Patient not taking: Reported on 8/11/2023), Disp: , Rfl:   •  traMADol (ULTRAM) 50 mg tablet, Take 1 tablet (50 mg total) by mouth 2 (two) times a day as needed for moderate pain (Patient not taking: Reported on 8/11/2023), Disp: 60 tablet, Rfl: 3  •  TURMERIC PO, Take 1 capsule by mouth 2 (two) times a day, Disp: , Rfl:   •  zolpidem (AMBIEN) 10 mg tablet, Take 1 tablet (10 mg total) by mouth daily at bedtime as needed for sleep, Disp: 90 tablet, Rfl: 1  Allergies   Allergen Reactions   • Sotalol Other (See Comments)     Prolonged QTc leading to torsades de pointes    • Penicillins Other (See Comments)     As a child calcium deposit in the arm    • Ace Inhibitors GI Intolerance     Did feel well on it   • Omeprazole Abdominal Pain, Rash and Vomiting     stomach pain, vomiting, rash       Labs:  Appointment on 08/10/2023   Component Date Value   • BNP 08/10/2023 153 (H)    Orders Only on 08/09/2023   Component Date Value   • Sodium 08/10/2023 129 (L)    • Potassium 08/10/2023 4.6    • Chloride 08/10/2023 96    • CO2 08/10/2023 25    • ANION GAP 08/10/2023 8    • BUN 08/10/2023 19    • Creatinine 08/10/2023 1.15    • Glucose, Fasting 08/10/2023 227 (H)    • Calcium 08/10/2023 9.9    • eGFR 08/10/2023 45    No results displayed because visit has over 200 results. Transcribe Orders on 07/03/2023   Component Date Value   • Sed Rate 07/03/2023 29    • Protime 07/03/2023 14.6 (H)    • INR 07/03/2023 1.12    Appointment on 07/03/2023   Component Date Value   • Cholesterol 07/03/2023 163    • Triglycerides 07/03/2023 68    • HDL, Direct 07/03/2023 53    • LDL Calculated 07/03/2023 96    • PTT 07/03/2023 33      Imaging: XR knee 1 or 2 vw left    Result Date: 8/22/2023  Narrative: This result has an attachment that is not available. AP LAT and [place] views of Left knee: Cemented, stemmed, revision tibial and femoral components are well fixed in proper alignment. The patellar component is well fixed in proper alignment. There are no radiographic signs of loosening or subsidence.  No radiographic signs of wear. This report is limited to orthopaedic interpretation. SHANNAN    Result Date: 8/8/2023  Narrative: •  Left Ventricle: Left ventricular cavity size is normal. Wall thickness is normal. The left ventricular ejection fraction is 65%. Systolic function is normal. Wall motion is normal. •  Right Ventricle: Right ventricular cavity size is normal. Systolic function is normal. •  Left Atrium: The atrium is moderately dilated. There is no thrombus. •  Atrial Septum: No patent foramen ovale detected using color flow Doppler at rest. •  Left Atrial Appendage: Left atrial appendage is dilated. There is normal function. There is no thrombus. •  Tricuspid Valve: There is moderate regurgitation. Cardioversion    Result Date: 8/8/2023  Narrative: Absence of thrombus in the LA and SARA was confirmed on SHANNAN Cardioversion pads were placed in the left parasternal and left paraspinal positions. With continued anesthesia provided by the CRNA, Single biphasic 300 J shock was successful at cardioverting the patient into Nomal Sinus Rhythm/Sinus Bradycardia. There were no complications. Compliance with anticoagulation was reinforced. US right upper quadrant    Result Date: 8/6/2023  Narrative: RIGHT UPPER QUADRANT ULTRASOUND INDICATION:     Mildly heterogenous liver with lobulation left lobe on CT. COMPARISON: 8/10/2013; 8/4/2023 TECHNIQUE:   Real-time ultrasound of the right upper quadrant was performed with a curvilinear transducer with both volumetric sweeps and still imaging techniques. FINDINGS: PANCREAS:  Visualized portions of the pancreas are within normal limits. AORTA AND IVC:  Visualized portions are normal for patient age. LIVER: Size: Moderately enlarged. The liver measures 18.2 cm in the midclavicular line. Contour:  Surface contour is smooth. Parenchyma:  Echogenicity and echotexture are within normal limits. No liver mass identified.  Limited imaging of the main portal vein shows it to be patent and hepatopetal. BILIARY: There is a gallbladder polyp measuring 6 mm. It is sessile in appearance. According to current consensus recommendations (SRU 2022; 000:1-12), for polyps of this size ( <=  6 mm) which have a low risk morphology (pedunculated with thick/wide stalk, or sessile), no follow-up is recommended. Reference: Management of Incidentally Detected Gallbladder Polyps: Society of Radiologists in Ultrasound Consensus Conference Recommendations. Radiology 2022; 000:1-12. https://pubs. rsna.org/doi/full/10.1148/radiol. 726995 No intrahepatic biliary dilatation. CBD measures 7.0 mm. No choledocholithiasis. KIDNEY: Right kidney measures 9.2 x 4.0 x 3.6 cm. Volume 68.9 mL Kidney within normal limits. ASCITES:   None. Impression: Hepatomegaly 6 mm gallbladder polyp incidentally noted Workstation performed: [id number]     VAS lower limb venous duplex study, complete bilateral    Result Date: 8/5/2023  Narrative:  [facility] REPORT CLINICAL: Indications: Patient is s/p left knee surgery on 07/31/2023, with a positive D-dimer test. Doctor wants to rule out deep and superficial venous thrombosis. Patient is on a blood thinner. Operative History: 2022-08-01 Cardiac electrophysiology study and ablation Risk Factors The patient has history of HTN, Diabetes (NIDDM (oral meds)), Hyperlipidemia and CKD. She has no history of DVT. CONCLUSION:  Impression: RIGHT LOWER LIMB: No evidence of acute or chronic deep vein thrombosis. No evidence of superficial thrombophlebitis noted. Doppler evaluation shows a normal response to augmentation maneuvers. . Popliteal, posterior tibial and anterior tibial arterial Doppler waveform's are triphasic. LEFT LOWER LIMB: No evidence of acute or chronic deep vein thrombosis. No evidence of superficial thrombophlebitis noted. Doppler evaluation shows a normal response to augmentation maneuvers.  Popliteal, posterior tibial and anterior tibial arterial Doppler waveform's are triphasic. SIGNATURE: Electronically Signed by: Myriam Blair MD on 2023-08-05 07:57:14 PM    CTA ED chest PE study    Result Date: 8/4/2023  Narrative: CTA - CHEST WITH IV CONTRAST - PULMONARY ANGIOGRAM INDICATION:   sob, knee surgery, elevated ddimer. COMPARISON: 2/17/2021 TECHNIQUE: CTA examination of the chest was performed using angiographic technique according to a protocol specifically tailored to evaluate for pulmonary embolism. Multiplanar 2D reformatted images were created from the source data. In addition, coronal 3D MIP postprocessing was performed on the acquisition scanner. Radiation dose length product (DLP) for this visit:  354 mGy-cm . This examination, like all CT scans performed in the [facility], was performed utilizing techniques to minimize radiation dose exposure, including the use of iterative reconstruction and automated exposure control. IV Contrast:  80 mL of iohexol (OMNIPAQUE) FINDINGS: PULMONARY ARTERIAL TREE:  No pulmonary embolus is seen. LUNGS: There is a 5 mm left lower lobe nodule on series 304 image 192, stable from 2021. There is no tracheal or endobronchial lesion. PLEURA:  Unremarkable. HEART/GREAT VESSELS:  Unremarkable for patient's age. No thoracic aortic aneurysm. MEDIASTINUM AND BETTY:  Unremarkable. CHEST WALL AND LOWER NECK: Incidental discovery of one or more thyroid nodule(s) measuring more than 1.5 cm and without suspicious features is noted in this patient who is above 28years old; according to guidelines published in the February 2015 white paper on incidental thyroid nodules in the Journal of the [employer] of Radiology [facility]), further characterization with thyroid ultrasound is recommended. VISUALIZED STRUCTURES IN THE UPPER ABDOMEN: The liver is mildly heterogeneous with slight lobulation along the left lobe. OSSEOUS STRUCTURES:  No acute fracture or destructive osseous lesion.  There are multiple old healed right-sided rib fractures. Impression: 1. No acute pulmonary embolism. 2.  Mildly heterogeneous liver with slight lobulation along the left lobe. Early cirrhosis is not excluded and follow-up with ultrasound is recommended. 3.  Incidental thyroid nodule(s) for which nonemergent thyroid. The study was marked in [facility] for immediate notification. Is recommended. Workstation performed: [id number]     XR chest 2 views    Result Date: 8/4/2023  Narrative: CHEST INDICATION:   palpitations. COMPARISON: Compared with 6/15/2022 EXAM PERFORMED/VIEWS:  XR CHEST PA & LATERAL FINDINGS: Left chest wall pacemaker. Cardiomediastinal silhouette appears unremarkable. The lungs are clear. No pneumothorax or pleural effusion. Osseous structures appear within normal limits for patient age. Impression: No acute cardiopulmonary disease. Workstation performed: [id number]     Cardiac EP device report    Result Date: 8/4/2023  Narrative: MDT-DUAL CHAMBER PPM (AAIR-DDDR MODE)/ ACTIVE SYSTEM IS MRI CONDITIONAL NB/ALERT-CARELINK TRANSMISSION: BATTERY STATUS "11 YRS." AP 0%  0%. ALL AVAILABLE LEAD PARAMETERS WITHIN NORMAL LIMITS. 27 VHRS & 3 FAST A/V NOTED. 3 AT/AF NOTED; 100% BURDEN. AVAIL EGRAMS PRESENT AS AFIB W/RVR RATES >140 BPM. PT HAS LEG SX EARLIER IN WEEK; ON METO SUCC & ELIQUIS. EF 55% (ECHO 2023). DR. OSWALD MADE AWARE. L/M FOR PT FOR ASSESSMENT HOWEVER NO C/B. NC     XR KNEE 1 OR 2 VW LEFT    Result Date: 7/31/2023  Narrative: XR KNEE 1 OR 2 VW LEFT HISTORY:  78 years  Female   REASON FOR STUDY:  post op joint prosthesis placement   Revision stems in place both tibia and femur. Please include distal 2/3 of femur EXAM: Left knee: AP, lateral, internal oblique and external oblique: 4 views PRIOR: 3/1/2023 left knee [facility] orth[facility] FINDINGS:  Revision type total left knee arthroplasty. Femoral and tibial components changed. 2 distal femoral cables.  Some residual cortical bone fragments along the medial femoral condyle where previous osteolyses was present. Soft tissues compatible with preceding surgery. Where ostial lysis was present on the preoperative study. Impression: IMPRESSION: Revision type total left knee arthroplasty in expected position.    Workstation:[id number]      Review of Systems:  Review of Systems   Cardiovascular: Positive for palpitations. Per HPI   Musculoskeletal: Positive for arthralgias, gait problem and myalgias. Left knee discomfort    All other systems reviewed and are negative. Physical Exam:  Physical Exam  Vitals reviewed. Constitutional:       Appearance: Normal appearance. Cardiovascular:      Rate and Rhythm: Normal rate and regular rhythm. Pulses: Normal pulses. Heart sounds: Normal heart sounds. Pulmonary:      Effort: Pulmonary effort is normal.      Breath sounds: Normal breath sounds. Abdominal:      General: Bowel sounds are normal.      Palpations: Abdomen is soft. Musculoskeletal:         General: Normal range of motion. Cervical back: Normal range of motion and neck supple. Right lower leg: No edema. Left lower leg: No edema. Skin:     General: Skin is warm and dry. Capillary Refill: Capillary refill takes less than 2 seconds. Neurological:      General: No focal deficit present. Mental Status: She is alert and oriented to person, place, and time. Psychiatric:         Mood and Affect: Mood normal.         Behavior: Behavior normal.         Discussion/Summary:  # Paroxysmal atrial fibrillation [id number]=7, on Eliquis 5mg BID for stroke prevention,. 3/12/21 sp cryoablation with pulmonary vein isolation, DCCV from AF to NSR.    8/02/22 sp atrial fibrillation ablation   Pulmonary vein isolation, rare isolation of right vein, and a straight modification with posterior wall modification.  Successful CTI flutter ablation with by directional block.  Elevated LA pressure and moderate 60-70%.  by Dr Gabriel Faith   On 1/06/22 sp DCCV single biphasic shock 200J to NSR.  8/07/23 sp SHANNAN DCCV with a single biphasic shock 300J which was successful to NSR. EKG ventricular rate 61 BPM, A paced. Continue on Cardizem CD 120mg daily, Metoprolol succinate 100mg Q12 hours and flecainide 100mg BID   # SSS,  3/15/21 Sp MDT dual chamber PPM insertion , Remote device check on 9/21/23  # Hypertension RUE sitting 132/70 continue on Losartan 50mg BID, Toprol XL 100mg Q12 hours, Cardizem CD 120mg daily, DASH diet   # Chronic HFpEF LVEF 55% by TTE 1/04/21 dry weight ? 159 pounds, weight in the office 157 pounds, on PE eu volemic, continue on Lasix 20mg daily, GDMT; Cardizem CD 120mg daily, Metoprolol succinate 100mg Q12 hours  And Losartan 50mg BID. 2mg sodium diet. # Hyponatremia sodium 129 on 8/10/23 .  Follow up with PCP, not been seen by nephrology     # Hyperlipidemia 7/03/23 TC  163, TG 68, HDL 53, LDL 96 continue on Zetia 10mg daily Heart healthy diet

## 2023-08-28 ENCOUNTER — APPOINTMENT (OUTPATIENT)
Dept: LAB | Facility: MEDICAL CENTER | Age: 78
End: 2023-08-28
Payer: MEDICARE

## 2023-08-28 ENCOUNTER — OFFICE VISIT (OUTPATIENT)
Dept: CARDIOLOGY CLINIC | Facility: CLINIC | Age: 78
End: 2023-08-28
Payer: MEDICARE

## 2023-08-28 ENCOUNTER — RA CDI HCC (OUTPATIENT)
Dept: OTHER | Facility: HOSPITAL | Age: 78
End: 2023-08-28

## 2023-08-28 VITALS
BODY MASS INDEX: 29.68 KG/M2 | OXYGEN SATURATION: 98 % | HEIGHT: 61 IN | HEART RATE: 61 BPM | WEIGHT: 157.2 LBS | DIASTOLIC BLOOD PRESSURE: 58 MMHG | SYSTOLIC BLOOD PRESSURE: 122 MMHG

## 2023-08-28 DIAGNOSIS — E87.1 HYPONATREMIA: ICD-10-CM

## 2023-08-28 DIAGNOSIS — I10 HYPERTENSION, UNSPECIFIED TYPE: ICD-10-CM

## 2023-08-28 DIAGNOSIS — Z95.0 PACEMAKER: ICD-10-CM

## 2023-08-28 DIAGNOSIS — E78.2 MIXED HYPERLIPIDEMIA: ICD-10-CM

## 2023-08-28 DIAGNOSIS — I48.0 PAROXYSMAL ATRIAL FIBRILLATION (HCC): Primary | ICD-10-CM

## 2023-08-28 DIAGNOSIS — I50.32 CHRONIC DIASTOLIC HEART FAILURE (HCC): ICD-10-CM

## 2023-08-28 LAB
ALBUMIN SERPL BCP-MCNC: 4 G/DL (ref 3.5–5)
ALP SERPL-CCNC: 128 U/L (ref 34–104)
ALT SERPL W P-5'-P-CCNC: 16 U/L (ref 7–52)
ANION GAP SERPL CALCULATED.3IONS-SCNC: 9 MMOL/L
AST SERPL W P-5'-P-CCNC: 15 U/L (ref 13–39)
BASOPHILS # BLD AUTO: 0.07 THOUSANDS/ÂΜL (ref 0–0.1)
BASOPHILS NFR BLD AUTO: 1 % (ref 0–1)
BILIRUB SERPL-MCNC: 0.57 MG/DL (ref 0.2–1)
BUN SERPL-MCNC: 26 MG/DL (ref 5–25)
CALCIUM SERPL-MCNC: 9.7 MG/DL (ref 8.4–10.2)
CHLORIDE SERPL-SCNC: 94 MMOL/L (ref 96–108)
CO2 SERPL-SCNC: 27 MMOL/L (ref 21–32)
CREAT SERPL-MCNC: 0.82 MG/DL (ref 0.6–1.3)
EOSINOPHIL # BLD AUTO: 0.22 THOUSAND/ÂΜL (ref 0–0.61)
EOSINOPHIL NFR BLD AUTO: 4 % (ref 0–6)
ERYTHROCYTE [DISTWIDTH] IN BLOOD BY AUTOMATED COUNT: 13.4 % (ref 11.6–15.1)
GFR SERPL CREATININE-BSD FRML MDRD: 68 ML/MIN/1.73SQ M
GLUCOSE P FAST SERPL-MCNC: 121 MG/DL (ref 65–99)
HCT VFR BLD AUTO: 38.3 % (ref 34.8–46.1)
HGB BLD-MCNC: 12 G/DL (ref 11.5–15.4)
IMM GRANULOCYTES # BLD AUTO: 0.02 THOUSAND/UL (ref 0–0.2)
IMM GRANULOCYTES NFR BLD AUTO: 0 % (ref 0–2)
LYMPHOCYTES # BLD AUTO: 1.3 THOUSANDS/ÂΜL (ref 0.6–4.47)
LYMPHOCYTES NFR BLD AUTO: 21 % (ref 14–44)
MCH RBC QN AUTO: 30.2 PG (ref 26.8–34.3)
MCHC RBC AUTO-ENTMCNC: 31.3 G/DL (ref 31.4–37.4)
MCV RBC AUTO: 97 FL (ref 82–98)
MONOCYTES # BLD AUTO: 0.63 THOUSAND/ÂΜL (ref 0.17–1.22)
MONOCYTES NFR BLD AUTO: 10 % (ref 4–12)
NEUTROPHILS # BLD AUTO: 4.03 THOUSANDS/ÂΜL (ref 1.85–7.62)
NEUTS SEG NFR BLD AUTO: 64 % (ref 43–75)
NRBC BLD AUTO-RTO: 0 /100 WBCS
PLATELET # BLD AUTO: 336 THOUSANDS/UL (ref 149–390)
PMV BLD AUTO: 9.6 FL (ref 8.9–12.7)
POTASSIUM SERPL-SCNC: 4.2 MMOL/L (ref 3.5–5.3)
PROT SERPL-MCNC: 7.2 G/DL (ref 6.4–8.4)
RBC # BLD AUTO: 3.97 MILLION/UL (ref 3.81–5.12)
SODIUM SERPL-SCNC: 130 MMOL/L (ref 135–147)
WBC # BLD AUTO: 6.27 THOUSAND/UL (ref 4.31–10.16)

## 2023-08-28 PROCEDURE — 93000 ELECTROCARDIOGRAM COMPLETE: CPT | Performed by: NURSE PRACTITIONER

## 2023-08-28 PROCEDURE — 99214 OFFICE O/P EST MOD 30 MIN: CPT | Performed by: NURSE PRACTITIONER

## 2023-08-28 RX ORDER — DILTIAZEM HYDROCHLORIDE 120 MG/1
120 CAPSULE, COATED, EXTENDED RELEASE ORAL DAILY
Qty: 90 CAPSULE | Refills: 3 | Status: SHIPPED | OUTPATIENT
Start: 2023-08-28

## 2023-08-28 RX ORDER — FLECAINIDE ACETATE 100 MG/1
100 TABLET ORAL 2 TIMES DAILY
Qty: 60180 TABLET | Refills: 3 | Status: SHIPPED | OUTPATIENT
Start: 2023-08-28

## 2023-08-31 ENCOUNTER — PROCEDURE VISIT (OUTPATIENT)
Dept: FAMILY MEDICINE CLINIC | Facility: CLINIC | Age: 78
End: 2023-08-31
Payer: MEDICARE

## 2023-08-31 VITALS
OXYGEN SATURATION: 96 % | SYSTOLIC BLOOD PRESSURE: 128 MMHG | WEIGHT: 157.5 LBS | TEMPERATURE: 97.2 F | DIASTOLIC BLOOD PRESSURE: 80 MMHG | HEART RATE: 72 BPM | HEIGHT: 61 IN | BODY MASS INDEX: 29.74 KG/M2

## 2023-08-31 DIAGNOSIS — G62.9 PERIPHERAL POLYNEUROPATHY: ICD-10-CM

## 2023-08-31 DIAGNOSIS — M79.18 MYOFASCIAL PAIN SYNDROME: Primary | ICD-10-CM

## 2023-08-31 DIAGNOSIS — G47.01 INSOMNIA DUE TO MEDICAL CONDITION: ICD-10-CM

## 2023-08-31 DIAGNOSIS — E87.1 HYPONATREMIA: ICD-10-CM

## 2023-08-31 DIAGNOSIS — G25.81 RLS (RESTLESS LEGS SYNDROME): ICD-10-CM

## 2023-08-31 PROCEDURE — 99213 OFFICE O/P EST LOW 20 MIN: CPT | Performed by: FAMILY MEDICINE

## 2023-08-31 PROCEDURE — 20552 NJX 1/MLT TRIGGER POINT 1/2: CPT | Performed by: FAMILY MEDICINE

## 2023-08-31 RX ORDER — GABAPENTIN 300 MG/1
300 CAPSULE ORAL
Qty: 30 CAPSULE | Refills: 5 | OUTPATIENT
Start: 2023-08-31

## 2023-08-31 RX ORDER — ROPINIROLE 1 MG/1
1 TABLET, FILM COATED ORAL
Qty: 90 TABLET | Refills: 1 | Status: SHIPPED | OUTPATIENT
Start: 2023-08-31

## 2023-08-31 RX ORDER — ZOLPIDEM TARTRATE 10 MG/1
10 TABLET ORAL
Qty: 90 TABLET | Refills: 1 | Status: SHIPPED | OUTPATIENT
Start: 2023-08-31

## 2023-08-31 RX ORDER — GABAPENTIN 300 MG/1
300 CAPSULE ORAL
Qty: 90 CAPSULE | Refills: 1 | Status: SHIPPED | OUTPATIENT
Start: 2023-08-31

## 2023-08-31 NOTE — PROGRESS NOTES
Name: Willis Garduno      : 1945      MRN: [id number]  Encounter Provider: Sylvester Gonzalez MD  Encounter Date: 2023   Encounter department: [address] [place] [address]     1. Myofascial pain syndrome  -     sarapin injection 10 mL  -     Trigger Point Injection    2. Hyponatremia  -     Basic metabolic panel; Future    3. RLS (restless legs syndrome)  -     rOPINIRole (REQUIP) 1 mg tablet; Take 1 tablet (1 mg total) by mouth daily at bedtime    4. Insomnia due to medical condition  -     zolpidem (AMBIEN) 10 mg tablet; Take 1 tablet (10 mg total) by mouth daily at bedtime as needed for sleep    5. Peripheral polyneuropathy  -     gabapentin (NEURONTIN) 300 mg capsule; Take 1 capsule (300 mg total) by mouth daily at bedtime    Daily fluid intake with Propel. Repeat BMP in 4 weeks. Dose of Requip increased to 1 mg at HS. A significant but separately identifiable additional service rendered during the encounter trigger point injections see procedure note. Subjective     Patient presents for trigger point injections left IT band. Status post left total knee replacement 2023. She is currently receiving physical therapy at home. Repeat labs CBC normal hemoglobin 12.0 improved from 9.5. Not taking oral iron. Repeat sodium 130 improved from 129. . Corrected sodium. Creatinine 0.82. GFR 68. She uses infrequent as needed Furosemide. On SSRI/SNRI-Duloxetine.      Lab Results   Component Value Date    WBC 6.27 2023    HGB 12.0 2023    HCT 38.3 2023    MCV 97 2023     2023     Lab Results   Component Value Date     2015    SODIUM 130 (L) 2023    K 4.2 2023    CL 94 (L) 2023    CO2 27 2023    ANIONGAP 9 2015    AGAP 9 2023    BUN 26 (H) 2023    CREATININE 0.82 2023    GLUC 191 (H) 2023    GLUF 121 (H) 2023    CALCIUM 9.7 2023    AST 15 2023 ALT 16 08/28/2023    ALKPHOS 128 (H) 08/28/2023    PROT 7.0 05/06/2015    TP 7.2 08/28/2023    BILITOT 0.78 05/06/2015    TBILI 0.57 08/28/2023    EGFR 68 08/28/2023         Review of Systems   Neurological:        Restless leg syndrome incomplete relief on generic Requip 0.5 mg at at bedtime. Psychiatric/Behavioral: Positive for sleep disturbance.         Chronic insomnia on as needed Zolpidem       Past Medical History:   Diagnosis Date   • Actinic keratosis     last assessed - [id number]   • Arthritis    • Atrial fibrillation with rapid ventricular response ([address])     last assessed - 26Apr2016   • Basal cell carcinoma    • Benign colon polyp     last assessed - 27Apr2015   • CHF (congestive heart failure) ([address]) 06/2022   • Disease of thyroid gland    • Effusion of knee joint right     Resolved - [id number]   • Esophageal reflux    • Fibromyalgia     last assessed - [id number]   • Fibromyalgia, primary    • GERD (gastroesophageal reflux disease)    • Hypertension    • Palpitations     last assessed - 30Apr2013   • Peroneal tendonitis, right     last assessed - 02Oct2013   • Right lumbar radiculopathy 03/17/2016   • Thyroid cancer ([address])    • Thyroid nodule    • Trochanteric bursitis of left hip 03/09/2018     Past Surgical History:   Procedure Laterality Date   • CARDIAC ELECTROPHYSIOLOGY STUDY AND ABLATION  08/2022   • CATARACT EXTRACTION Bilateral    • COLONOSCOPY  03/2018   • COLONOSCOPY W/ POLYPECTOMY  2021    repeat in 5 years   • EYE SURGERY     • HYSTERECTOMY     • JOINT REPLACEMENT Left     knee   • MD NEUROPLASTY &/TRANSPOS MEDIAN NRV CARPAL TUNNE Right 11/14/2019    Procedure: RELEASE CARPAL TUNNEL;  Surgeon: Dipesh Rodney MD;  Location: [facility];  Service: Orthopedics   • MD TOTAL THYROID LOBECTOMY UNI W/WO ISTHMUSECTOMY Left 12/16/2020    Procedure: Left THYROID LOBECTOMY;  Surgeon: Niesha Devlin MD;  Location: [facility];  Service: ENT   • RECTAL POLYPECTOMY     • REPLACEMENT TOTAL KNEE Left     last assessed - [id number]   • TONSILLECTOMY     • TOTAL ABDOMINAL HYSTERECTOMY     • TUBAL LIGATION     • US GUIDED THYROID BIOPSY  10/14/2020     Family History   Problem Relation Age of Onset   • Heart disease Mother    • Diabetes Mother    • Heart disease Father    • Coronary artery disease Father    • Stroke Father         cerebrovascular accident   • Heart attack Father         myocardial infarction   • Sudden death Father         scd   • Other Family         Back disorder   • Coronary artery disease Family    • Neuropathy Family    • Osteoporosis Family    • No Known Problems Daughter    • No Known Problems Maternal Grandmother    • No Known Problems Maternal Grandfather    • No Known Problems Paternal Grandmother    • No Known Problems Paternal Grandfather    • Cancer Maternal Uncle    • Breast cancer Maternal Aunt 72   • No Known Problems Son    • No Known Problems Maternal Aunt    • No Known Problems Maternal Aunt    • No Known Problems Maternal Aunt    • No Known Problems Paternal Aunt    • No Known Problems Paternal Aunt    • Anuerysm Neg Hx    • Clotting disorder Neg Hx    • Arrhythmia Neg Hx    • Heart failure Neg Hx      Social History     Socioeconomic History   • Marital status:       Spouse name: None   • Number of children: None   • Years of education: None   • Highest education level: None   Occupational History   • None   Tobacco Use   • Smoking status: Never   • Smokeless tobacco: Never   Vaping Use   • Vaping Use: Never used   Substance and Sexual Activity   • Alcohol use: Not Currently   • Drug use: Never   • Sexual activity: Not Currently   Other Topics Concern   • None   Social History Narrative   • None     Social Determinants of Health     Financial Resource Strain: Low Risk  (9/19/2022)    Overall Financial Resource Strain (CARDIA)    • Difficulty of Paying Living Expenses: Not hard at all   Food Insecurity: Not on file   Transportation Needs: No Transportation Needs (9/19/2022) PRAPARE - Transportation    • Lack of Transportation (Medical): No    • Lack of Transportation (Non-Medical): No   Physical Activity: Not on file   Stress: Not on file   Social Connections: Not on file   Intimate Partner Violence: Not on file   Housing Stability: Not on file     Current Outpatient Medications on File Prior to Visit   Medication Sig   • apixaban (ELIQUIS) 5 mg Take 1 tablet (5 mg total) by mouth 2 (two) times a day   • Arthritis Pain Relief 650 MG CR tablet Take 650 mg by mouth 3 (three) times a day   • ascorbic acid (VITAMIN C) 500 mg tablet Take 500 mg by mouth daily    • Cetirizine HCl (ZyrTEC Allergy) 10 MG CAPS Take 10 mg by mouth in the morning   • Chromium Picolinate (CHROMIUM PICOLATE PO) Take 1 tablet by mouth daily   • diltiazem (CARDIZEM CD) 120 mg 24 hr capsule Take 1 capsule (120 mg total) by mouth daily   • DULoxetine (CYMBALTA) 30 mg delayed release capsule take 1 capsule by mouth once daily   • ezetimibe (ZETIA) 10 mg tablet take 1 tablet by mouth once daily   • famotidine (PEPCID) 20 mg tablet Take 20 mg by mouth every other day     • flecainide (TAMBOCOR) 100 mg tablet Take 1 tablet (100 mg total) by mouth 2 (two) times a day   • furosemide (LASIX) 20 mg tablet take 2 tablet by mouth daily if needed for shortness of breath WEIGHT GAIN 3 LBS IN 1 DAY OR LOWER LEG SWELLING   • ipratropium (ATROVENT) 0.03 % nasal spray 2 sprays into each nostril 3 (three) times a day Begin once per day, then progress to twice per day. Progress to three times a day as tolerated.    • lidocaine (XYLOCAINE) 5 % ointment Apply topically as needed for mild pain   • losartan (COZAAR) 50 mg tablet Take 1 tablet (50 mg total) by mouth 2 (two) times a day   • metoprolol succinate (TOPROL-XL) 25 mg 24 hr tablet Take 4 tablets (100 mg total) by mouth every 12 (twelve) hours   • TURMERIC PO Take 1 capsule by mouth 2 (two) times a day   • [DISCONTINUED] gabapentin (NEURONTIN) 300 mg capsule Take 1 capsule (300 mg total) by mouth daily at bedtime   • [DISCONTINUED] rOPINIRole (REQUIP) 0.5 mg tablet Take 1 tablet (0.5 mg total) by mouth daily at bedtime   • [DISCONTINUED] zolpidem (AMBIEN) 10 mg tablet Take 1 tablet (10 mg total) by mouth daily at bedtime as needed for sleep   • Cholecalciferol 50 MCG (2000 UT) CAPS Take 2,000 Units by mouth 2 (two) times a day (Patient not taking: Reported on 8/31/2023)   • [DISCONTINUED] senna (SENOKOT) 8.6 MG tablet Take 17.2 mg by mouth 2 (two) times a day as needed (Patient not taking: Reported on 8/11/2023)     Allergies   Allergen Reactions   • Sotalol Other (See Comments)     Prolonged QTc leading to torsades de pointes    • Penicillins Other (See Comments)     As a child calcium deposit in the arm    • Ace Inhibitors GI Intolerance     Did feel well on it   • Omeprazole Abdominal Pain, Rash and Vomiting     stomach pain, vomiting, rash     Immunization History   Administered Date(s) Administered   • COVID-19 MODERNA VACC 0.5 ML IM 01/27/2021, 02/24/2021   • INFLUENZA 12/23/2015, 11/07/2016, 10/18/2017, 12/04/2018, 09/19/2022   • Influenza Split High Dose Preservative Free IM 10/01/2014, 12/23/2015, 11/07/2016, 10/18/2017   • Influenza, high dose seasonal 0.7 mL 12/04/2018, 09/26/2019, 09/08/2020, 11/03/2021, 09/19/2022   • Influenza, seasonal, injectable 10/16/2012   • Pneumococcal Conjugate 13-Valent 04/27/2015   • Pneumococcal Polysaccharide PPV23 03/29/2022       Objective     /80 (BP Location: Left arm, Patient Position: Sitting, Cuff Size: Standard)   Pulse 72   Temp (!) 97.2 °F (36.2 °C)   Ht 5' 1" (1.549 m)   Wt 71.4 kg (157 lb 8 oz)   LMP 02/01/1990 (Within Weeks)   SpO2 96%   BMI 29.76 kg/m²     BP Readings from Last 3 Encounters:   08/31/23 128/80   08/28/23 122/58   08/11/23 136/80         Physical Exam  Constitutional:       General: She is not in acute distress. Musculoskeletal:      Right lower leg: No edema. Left lower leg: No edema.       Comments: Multiple trigger point tender areas along left IT band. Full extension of left knee. 90 degrees of flexion left knee   Skin:     Comments: Well-healed left knee incision. Neurological:      Mental Status: She is alert. Universal Protocol:  Consent: Verbal consent obtained. Risks and benefits: risks, benefits and alternatives were discussed  Consent given by: patient    Supporting Documentation  Indications: pain (Myofascial pain syndrome-IT band)   Trigger Point Injections: single/multiple trigger point(s): 1-2 muscle groups    Injection site identified by: palpation    Procedure Details  Location(s):    Prep: patient was prepped and draped in usual sterile fashion (Betadine)  Needle size: 27 G  Patient tolerance: patient tolerated the procedure well with no immediate complications  Additional procedure details: Using aseptic technique I injected 5 areas along the length of the patient's left IT band/L lateral thigh.  Sarapin   10 ML and Lidocaine 1% 5 ML           Bucky Landa MD

## 2023-08-31 NOTE — TELEPHONE ENCOUNTER
RN s/w pt and she remains on gabapentin 300 mg at HS, it is helpful and not causing s/e. Told her that Dr. Ulices Beal Lp the gabapentin for her on 2/28/23 w/ 5 RF. She didn't realize Dr. Ulices Beal was the last one to have prescribed it. She said she actually has an ovs w/ him later today and will ask him for refill. Told pt if he doesn't want to do RF then to contact our office and we will. Samara Quintero can you refuse medication request since she will be seeing Dr. Ulices Beal today and will ask him for refills. We LP 8/9/2022, LOVS 8/9/2022.

## 2023-09-08 ENCOUNTER — TELEPHONE (OUTPATIENT)
Dept: CARDIOLOGY CLINIC | Facility: CLINIC | Age: 78
End: 2023-09-08

## 2023-09-08 NOTE — TELEPHONE ENCOUNTER
Patient's daughter called this morning requesting information about A-fib burden on her mother's pacemaker. Upon checking [facility], the last transmission  care alert) was sent 8/4/2023. I reviewed the information with device clinic who suggests that the patient send a transmission. I spoke with Ramona Davis, patient's daughter. She will let her mother know to send a transmission. This will likely be reviewed Monday (due to time of day), and we will call next week with the A-fib burden. Message will be forwarded to the device clinic. Thank you for your help.

## 2023-09-11 ENCOUNTER — EVALUATION (OUTPATIENT)
Dept: PHYSICAL THERAPY | Facility: MEDICAL CENTER | Age: 78
End: 2023-09-11
Payer: MEDICARE

## 2023-09-11 DIAGNOSIS — Z47.1 AFTERCARE FOLLOWING LEFT KNEE JOINT REPLACEMENT SURGERY: ICD-10-CM

## 2023-09-11 DIAGNOSIS — Z96.652 S/P REVISION OF TOTAL KNEE, LEFT: Primary | ICD-10-CM

## 2023-09-11 DIAGNOSIS — Z96.652 AFTERCARE FOLLOWING LEFT KNEE JOINT REPLACEMENT SURGERY: ICD-10-CM

## 2023-09-11 PROCEDURE — 97161 PT EVAL LOW COMPLEX 20 MIN: CPT

## 2023-09-11 NOTE — LETTER
2023    Michael Shell MD  [address] [place] [address],[address] [address]    Patient: Mirlande Mark   YOB: 1945   Date of Visit: 2023     Encounter Diagnosis     ICD-10-CM    1. S/P revision of total knee, left  Z96.652       2. Aftercare following left knee joint replacement surgery  Z47.1     E01.920           Dear Dr. Gilbert Salazar: Thank you for your recent referral of Mirlande Mark. Please review the attached evaluation summary from Farnaz's recent visit. Please verify that you agree with the plan of care by signing the attached order. If you have any questions or concerns, please do not hesitate to call. I sincerely appreciate the opportunity to share in the care of one of your patients and hope to have another opportunity to work with you in the near future. Sincerely,    Davon Rankin, PT      Referring Provider:      I certify that I have read the below Plan of Care and certify the need for these services furnished under this plan of treatment while under my care. Michael Shell MD  [address] [place] [address],[address] [address]  Via Fax: [phone]          PT Evaluation     Today's date: 2023  Patient name: Mirlande Mark  : 1945  MRN: [id number]  Referring provider: Michael Shell MD  Dx:   Encounter Diagnosis     ICD-10-CM    1. S/P revision of total knee, left  Z96.652       2. Aftercare following left knee joint replacement surgery  Z47.1     Z96.652           Start Time: 1400  Stop Time: 1445  Total time in clinic (min): 45 minutes    Assessment  Assessment details: Patient is a 77year old female reporting to therapy with the referring diagnosis of  S/P revision of total knee, left which was performed on 23 and Aftercare following left knee joint replacement surgery.  Upon examination, the patient presents with deficits including decreased strength, decreased knee ROM, decreased gait speed, and decreased balance. Patient's deficits interfere with adls which include home and community navigation and working in her garden. James was educated on her examination findings today. She is a good candidate for therapy in order to address her stated deficits and improve her function so she can return to activities with less interference. Positive prognostic factors include good understanding of diagnosis and prognosis. Negative prognostic factors include patient reports of decreased baseline knee rom from knee surgery in 2008. Impairments: abnormal gait, abnormal or restricted ROM, activity intolerance, impaired balance, impaired physical strength, lacks appropriate home exercise program and pain with function    Symptom irritability: moderateUnderstanding of Dx/Px/POC: good   Prognosis: good    Goals  STG  -Patient will be IND with basic level HEP within 4 weeks in order to make improvements at home   - patient will have improved knee flexion rom of 10 degrees within 5 weeks  - patient will have improved gait speed of 0.1 meter/sec within 5 weeks in order to improve function with adls      LTG  -Patient will be IND with an advanced level HEP within 8 weeks in order to make improvements at home   - Patient will reach her FOTO score goal within 8 weeks  - patient will have improved knee flexion rom of 15 degrees within 10 weeks  - patient will have improved gait speed of 0.2 meter/sec within 10 weeks in order to improve function with adls and have gait speed >1.0 m/sec  - patient will not be classified as a fall risk per tug test times within 8 weeks      Plan  Plan details: Skilled PT to improve strength, ROM, flexibility, endurance, pain, and function.      Patient would benefit from: PT eval and skilled physical therapy  Referral necessary: No  Planned therapy interventions: IASTM, manual therapy, massage, muscle pump exercises, neuromuscular re-education, patient education, ADL retraining, activity modification, balance/weight bearing training, balance, body mechanics training, functional ROM exercises, flexibility, gait training, graded activity, graded exercise, graded motor, home exercise program, strengthening, stretching, therapeutic activities, therapeutic exercise, therapeutic training and transfer training  Frequency: 2x week  Duration in weeks: 12  Plan of Care beginning date: 2023  Plan of Care expiration date: 2023  Treatment plan discussed with: patient        Subjective Evaluation    History of Present Illness  Mechanism of injury: Patient reports she had left knee surgery (revision of total knee, left) on 23. Patient reports her original knee replacement was in . She reports she had home physical therapy up until last week. She reports everything has been going well to far. She reports that at first she was only putting 50% weight on her leg now she can put full weight through her leg per doctors orders. She has been using a SPC for her walking which she presents to therapy with today. She reports she gets some muscle soreness however she reports she does not have any pain. Patient reports her goals for therapy are to work on her motion and strength. She reports difficulty with navigating three steps with a handrail to get from the grauge into her home. Patient also states her goals for therapy are to be able to return to working in her garden again. Per chart review patient is following [facility] orders.  Patient denies any numbness or tingling symptoms            Not a recurrent problem   Quality of life: good    Patient Goals  Patient goals for therapy: decreased pain, improved balance, increased motion, independence with ADLs/IADLs, return to sport/leisure activities and increased strength    Pain  Current pain ratin  At best pain ratin  At worst pain ratin  Quality: tight  Relieving factors: rest and relaxation  Aggravating factors: sitting  Progression: no change          Objective     Neurological Testing     Sensation     Knee   Left Knee   Intact: light touch    Right Knee   Intact: light touch     Additional Neurological Details  Dermatomes/myotomes wnl    Active Range of Motion   Left Knee   Flexion: 89 degrees   Extension: 2 degrees     Right Knee   Flexion: 110 degrees   Extension: 0 degrees   Left Ankle/Foot   Normal active range of motion    Right Ankle/Foot   Normal active range of motion    Passive Range of Motion   Left Knee   Flexion: 94 degrees   Extension: 1 degrees     Right Knee   Flexion: 112 degrees   Extension: 0 degrees     Strength/Myotome Testing     Left Knee   Flexion: 4+  Extension: 4+    Right Knee   Flexion: 5  Extension: 5    Left Ankle/Foot   Normal strength    Right Ankle/Foot   Normal strength    General Comments:      Knee Comments  Tug test - 12 seconds with spc  10 meter walk test- 10 meter/11.3sec=0.88 m/sec       Incision  - intact  - no drainage or discharge  - calf soft non tender, no redness or bruising  - patient educated on inspecting leg daily   - patient educated on signs of infection and dvt to continue to monitor and look out for              Precautions:   Past Medical History:   Diagnosis Date   • Actinic keratosis     last assessed - [id number]   • Arthritis    • Atrial fibrillation with rapid ventricular response (HCC)     last assessed - 26Apr2016   • Basal cell carcinoma    • Benign colon polyp     last assessed - 27Apr2015   • CHF (congestive heart failure) ([address]) 06/2022   • Disease of thyroid gland    • Effusion of knee joint right     Resolved - [id number]   • Esophageal reflux    • Fibromyalgia     last assessed - [id number]   • Fibromyalgia, primary    • GERD (gastroesophageal reflux disease)    • Hypertension    • Palpitations     last assessed - 30Apr2013   • Peroneal tendonitis, right     last assessed - 02Oct2013   • Right lumbar radiculopathy 03/17/2016   • Thyroid cancer [facility])    • Thyroid nodule    • Trochanteric bursitis of left hip 03/09/2018           Manuals 9/11            Knee prom                                                    Neuro Re-Ed             Quad set Hep 20x            saq Hep 2x10            laq             slr             SL abd                                       Ther Ex             Heel slide  Hep 20x            Knee ext stretch             bike                                                                              Ther Activity                                       Gait Training                                       Modalities

## 2023-09-11 NOTE — PROGRESS NOTES
PT Evaluation     Today's date: 2023  Patient name: Eri Salgdao  : 1945  MRN: [id number]  Referring provider: Sharleen Soulier, MD  Dx:   Encounter Diagnosis     ICD-10-CM    1. S/P revision of total knee, left  Z96.652       2. Aftercare following left knee joint replacement surgery  Z47.1     Z96.652           Start Time: 1400  Stop Time: 1445  Total time in clinic (min): 45 minutes    Assessment  Assessment details: Patient is a 77year old female reporting to therapy with the referring diagnosis of  S/P revision of total knee, left which was performed on 23 and Aftercare following left knee joint replacement surgery. Upon examination, the patient presents with deficits including decreased strength, decreased knee ROM, decreased gait speed, and decreased balance. Patient's deficits interfere with adls which include home and community navigation and working in her garden. Patent was educated on her examination findings today. She is a good candidate for therapy in order to address her stated deficits and improve her function so she can return to activities with less interference. Positive prognostic factors include good understanding of diagnosis and prognosis. Negative prognostic factors include patient reports of decreased baseline knee rom from knee surgery in .         Impairments: abnormal gait, abnormal or restricted ROM, activity intolerance, impaired balance, impaired physical strength, lacks appropriate home exercise program and pain with function    Symptom irritability: moderateUnderstanding of Dx/Px/POC: good   Prognosis: good    Goals  STG  -Patient will be IND with basic level HEP within 4 weeks in order to make improvements at home   - patient will have improved knee flexion rom of 10 degrees within 5 weeks  - patient will have improved gait speed of 0.1 meter/sec within 5 weeks in order to improve function with adls      LTG  -Patient will be IND with an advanced level HEP within 8 weeks in order to make improvements at home   - Patient will reach her FOTO score goal within 8 weeks  - patient will have improved knee flexion rom of 15 degrees within 10 weeks  - patient will have improved gait speed of 0.2 meter/sec within 10 weeks in order to improve function with adls and have gait speed >1.0 m/sec  - patient will not be classified as a fall risk per tug test times within 8 weeks      Plan  Plan details: Skilled PT to improve strength, ROM, flexibility, endurance, pain, and function. Patient would benefit from: PT eval and skilled physical therapy  Referral necessary: No  Planned therapy interventions: IASTM, manual therapy, massage, muscle pump exercises, neuromuscular re-education, patient education, ADL retraining, activity modification, balance/weight bearing training, balance, body mechanics training, functional ROM exercises, flexibility, gait training, graded activity, graded exercise, graded motor, home exercise program, strengthening, stretching, therapeutic activities, therapeutic exercise, therapeutic training and transfer training  Frequency: 2x week  Duration in weeks: 12  Plan of Care beginning date: 9/11/2023  Plan of Care expiration date: 12/4/2023  Treatment plan discussed with: patient        Subjective Evaluation    History of Present Illness  Mechanism of injury: Patient reports she had left knee surgery (revision of total knee, left) on 7/31/23. Patient reports her original knee replacement was in 2008. She reports she had home physical therapy up until last week. She reports everything has been going well to far. She reports that at first she was only putting 50% weight on her leg now she can put full weight through her leg per doctors orders. She has been using a SPC for her walking which she presents to therapy with today. She reports she gets some muscle soreness however she reports she does not have any pain.  Patient reports her goals for therapy are to work on her motion and strength. She reports difficulty with navigating three steps with a handrail to get from the grauge into her home. Patient also states her goals for therapy are to be able to return to working in her garden again. Per chart review patient is following [facility]AT orders.  Patient denies any numbness or tingling symptoms            Not a recurrent problem   Quality of life: good    Patient Goals  Patient goals for therapy: decreased pain, improved balance, increased motion, independence with ADLs/IADLs, return to sport/leisure activities and increased strength    Pain  Current pain ratin  At best pain ratin  At worst pain ratin  Quality: tight  Relieving factors: rest and relaxation  Aggravating factors: sitting  Progression: no change          Objective     Neurological Testing     Sensation     Knee   Left Knee   Intact: light touch    Right Knee   Intact: light touch     Additional Neurological Details  Dermatomes/myotomes wnl    Active Range of Motion   Left Knee   Flexion: 89 degrees   Extension: 2 degrees     Right Knee   Flexion: 110 degrees   Extension: 0 degrees   Left Ankle/Foot   Normal active range of motion    Right Ankle/Foot   Normal active range of motion    Passive Range of Motion   Left Knee   Flexion: 94 degrees   Extension: 1 degrees     Right Knee   Flexion: 112 degrees   Extension: 0 degrees     Strength/Myotome Testing     Left Knee   Flexion: 4+  Extension: 4+    Right Knee   Flexion: 5  Extension: 5    Left Ankle/Foot   Normal strength    Right Ankle/Foot   Normal strength    General Comments:      Knee Comments  Tug test - 12 seconds with spc  10 meter walk test- 10 meter/11.3sec=0.88 m/sec       Incision  - intact  - no drainage or discharge  - calf soft non tender, no redness or bruising  - patient educated on inspecting leg daily   - patient educated on signs of infection and dvt to continue to monitor and look out for               Precautions:   Past Medical History:   Diagnosis Date   • Actinic keratosis     last assessed - [id number]   • Arthritis    • Atrial fibrillation with rapid ventricular response (HCC)     last assessed - 26Apr2016   • Basal cell carcinoma    • Benign colon polyp     last assessed - 27Apr2015   • CHF (congestive heart failure) ([address]) 06/2022   • Disease of thyroid gland    • Effusion of knee joint right     Resolved - [id number]   • Esophageal reflux    • Fibromyalgia     last assessed - [id number]   • Fibromyalgia, primary    • GERD (gastroesophageal reflux disease)    • Hypertension    • Palpitations     last assessed - 30Apr2013   • Peroneal tendonitis, right     last assessed - 02Oct2013   • Right lumbar radiculopathy 03/17/2016   • Thyroid cancer ([address])    • Thyroid nodule    • Trochanteric bursitis of left hip 03/09/2018           Manuals 9/11            Knee prom                                                    Neuro Re-Ed             Quad set Hep 20x            saq Hep 2x10            laq             slr             SL abd                                       Ther Ex             Heel slide  Hep 20x            Knee ext stretch             bike                                                                              Ther Activity                                       Gait Training                                       Modalities

## 2023-09-13 ENCOUNTER — OFFICE VISIT (OUTPATIENT)
Dept: PHYSICAL THERAPY | Facility: MEDICAL CENTER | Age: 78
End: 2023-09-13
Payer: MEDICARE

## 2023-09-13 DIAGNOSIS — Z96.652 S/P REVISION OF TOTAL KNEE, LEFT: Primary | ICD-10-CM

## 2023-09-13 DIAGNOSIS — Z96.652 AFTERCARE FOLLOWING LEFT KNEE JOINT REPLACEMENT SURGERY: ICD-10-CM

## 2023-09-13 DIAGNOSIS — Z47.1 AFTERCARE FOLLOWING LEFT KNEE JOINT REPLACEMENT SURGERY: ICD-10-CM

## 2023-09-13 PROCEDURE — 97140 MANUAL THERAPY 1/> REGIONS: CPT

## 2023-09-13 PROCEDURE — 97110 THERAPEUTIC EXERCISES: CPT

## 2023-09-13 NOTE — PROGRESS NOTES
Daily Note     Today's date: 2023  Patient name: Lefty Degroot  : 1945  MRN: [id number]  Referring provider: Nuvia Young MD  Dx:   Encounter Diagnosis     ICD-10-CM    1. S/P revision of total knee, left  Z96.652       2. Aftercare following left knee joint replacement surgery  Z47.1     Z96.652           Start Time: 1244  Stop Time: 1314  Total time in clinic (min): 30 minutes    Subjective: Patient reports her knee "feels fine" today. She reports no pain. She reports she was doing a lot of walking and was tired and didn't get a chance to do home exercises. Objective: See treatment diary below      Assessment: Tolerated treatment well. Further implement rom exercises today which the patient tolerated well. Patient educated on gentle hamstring stretch with appropriate intensity and hold times. Patient performed well with all exercises and completed them without pain or symptoms increase. Patient demonstrated fatigue post treatment and would benefit from continued PT. Patient educated on her hep. Plan: Continue per plan of care.       Precautions:   Past Medical History:   Diagnosis Date   • Actinic keratosis     last assessed - [id number]   • Arthritis    • Atrial fibrillation with rapid ventricular response ([address])     last assessed - 2016   • Basal cell carcinoma    • Benign colon polyp     last assessed - 2015   • CHF (congestive heart failure) ([address]) 2022   • Disease of thyroid gland    • Effusion of knee joint right     Resolved - [id number]   • Esophageal reflux    • Fibromyalgia     last assessed - [id number]   • Fibromyalgia, primary    • GERD (gastroesophageal reflux disease)    • Hypertension    • Palpitations     last assessed - 2013   • Peroneal tendonitis, right     last assessed - [id number]   • Right lumbar radiculopathy 2016   • Thyroid cancer [facility])    • Thyroid nodule    • Trochanteric bursitis of left hip 2018           Manuals  Knee prom  TE                                                  Neuro Re-Ed             Quad set Hep 20x            saq Hep 2x10 2x10           laq  2x10           slr  2x10           SL abd  2x10                                     Ther Ex             Heel slide  Hep 20x 20x           Passive Knee ext stretch  1'x2           HSS  seated 3x30"                                                                            Ther Activity                                       Gait Training                                       Modalities

## 2023-09-14 ENCOUNTER — TELEPHONE (OUTPATIENT)
Age: 78
End: 2023-09-14

## 2023-09-14 NOTE — TELEPHONE ENCOUNTER
Pt called requesting labs to be ordered for her. Pt said she feels that her electrolytes are low again. Pt said she has noticed feeling off for a couple of weeks.   Please advise

## 2023-09-15 NOTE — TELEPHONE ENCOUNTER
Tried calling patient, phone call was dropped was not able to reach her   Called daughter left message for her asking her to let her mom know we've been trying to get a hold of her

## 2023-09-18 ENCOUNTER — APPOINTMENT (OUTPATIENT)
Dept: LAB | Facility: MEDICAL CENTER | Age: 78
End: 2023-09-18
Payer: MEDICARE

## 2023-09-18 ENCOUNTER — OFFICE VISIT (OUTPATIENT)
Dept: PHYSICAL THERAPY | Facility: MEDICAL CENTER | Age: 78
End: 2023-09-18
Payer: MEDICARE

## 2023-09-18 DIAGNOSIS — Z96.652 S/P REVISION OF TOTAL KNEE, LEFT: Primary | ICD-10-CM

## 2023-09-18 DIAGNOSIS — E87.1 HYPONATREMIA: ICD-10-CM

## 2023-09-18 DIAGNOSIS — Z96.652 AFTERCARE FOLLOWING LEFT KNEE JOINT REPLACEMENT SURGERY: ICD-10-CM

## 2023-09-18 DIAGNOSIS — Z47.1 AFTERCARE FOLLOWING LEFT KNEE JOINT REPLACEMENT SURGERY: ICD-10-CM

## 2023-09-18 LAB
ANION GAP SERPL CALCULATED.3IONS-SCNC: 8 MMOL/L
BUN SERPL-MCNC: 20 MG/DL (ref 5–25)
CALCIUM SERPL-MCNC: 9.7 MG/DL (ref 8.4–10.2)
CHLORIDE SERPL-SCNC: 97 MMOL/L (ref 96–108)
CO2 SERPL-SCNC: 28 MMOL/L (ref 21–32)
CREAT SERPL-MCNC: 0.78 MG/DL (ref 0.6–1.3)
GFR SERPL CREATININE-BSD FRML MDRD: 72 ML/MIN/1.73SQ M
GLUCOSE P FAST SERPL-MCNC: 112 MG/DL (ref 65–99)
POTASSIUM SERPL-SCNC: 4.4 MMOL/L (ref 3.5–5.3)
SODIUM SERPL-SCNC: 133 MMOL/L (ref 135–147)

## 2023-09-18 PROCEDURE — 97112 NEUROMUSCULAR REEDUCATION: CPT

## 2023-09-18 PROCEDURE — 97140 MANUAL THERAPY 1/> REGIONS: CPT

## 2023-09-18 PROCEDURE — 97110 THERAPEUTIC EXERCISES: CPT

## 2023-09-18 PROCEDURE — 80048 BASIC METABOLIC PNL TOTAL CA: CPT

## 2023-09-18 PROCEDURE — 36415 COLL VENOUS BLD VENIPUNCTURE: CPT

## 2023-09-18 NOTE — PROGRESS NOTES
Daily Note     Today's date: 2023  Patient name: Arnie Risk  : 1945  MRN: [id number]  Referring provider: Jean Carlos Sanders MD  Dx:   Encounter Diagnosis     ICD-10-CM    1. S/P revision of total knee, left  Z96.652       2. Aftercare following left knee joint replacement surgery  Z47.1     Z96.652                      Subjective: L knee continues to feel good, SPC use for balance. She is concerned about redness and swelling in LLE. Objective: See treatment diary below      Assessment: Tolerated treatment well. Soft pitting edema noted below the knee into the ankle, warm to the touch, no S/S of infection. Pt is making good gains with flexion and extension. Good quad activation with SAQ/LAQ, lag noted with SLR. Continued PT would be beneficial to improve function.          Plan: Continue per plan of care.        Precautions:   Past Medical History:   Diagnosis Date   • Actinic keratosis     last assessed -    • Arthritis    • Atrial fibrillation with rapid ventricular response ([address])     last assessed - 2016   • Basal cell carcinoma    • Benign colon polyp     last assessed - 2015   • CHF (congestive heart failure) ([address]) 2022   • Disease of thyroid gland    • Effusion of knee joint right     Resolved - [id number]   • Esophageal reflux    • Fibromyalgia     last assessed - 2017   • Fibromyalgia, primary    • GERD (gastroesophageal reflux disease)    • Hypertension    • Palpitations     last assessed - 2013   • Peroneal tendonitis, right     last assessed - 2013   • Right lumbar radiculopathy 2016   • Thyroid cancer [facility])    • Thyroid nodule    • Trochanteric bursitis of left hip 2018           Manuals           Knee prom  TE MB                                                 Neuro Re-Ed             Quad set Hep 20x            saq Hep 2x10 2x10 2x10          laq  2x10 2x10          slr  2x10 2x10          SL abd  2x10 2x10 Ther Ex             Heel slide  Hep 20x 20x 20x          Passive Knee ext stretch  1'x2 1'x2          HSS  seated 3x30" seated 3x30"          Mini squats    2x10                                                              Ther Activity                                       Gait Training                                       Modalities

## 2023-09-20 ENCOUNTER — OFFICE VISIT (OUTPATIENT)
Dept: PHYSICAL THERAPY | Facility: MEDICAL CENTER | Age: 78
End: 2023-09-20
Payer: MEDICARE

## 2023-09-20 ENCOUNTER — OFFICE VISIT (OUTPATIENT)
Dept: FAMILY MEDICINE CLINIC | Facility: CLINIC | Age: 78
End: 2023-09-20
Payer: MEDICARE

## 2023-09-20 ENCOUNTER — TELEPHONE (OUTPATIENT)
Age: 78
End: 2023-09-20

## 2023-09-20 VITALS
DIASTOLIC BLOOD PRESSURE: 76 MMHG | HEIGHT: 61 IN | OXYGEN SATURATION: 97 % | BODY MASS INDEX: 30.49 KG/M2 | TEMPERATURE: 98.3 F | HEART RATE: 77 BPM | WEIGHT: 161.5 LBS | SYSTOLIC BLOOD PRESSURE: 122 MMHG

## 2023-09-20 DIAGNOSIS — M70.62 TROCHANTERIC BURSITIS OF LEFT HIP: Primary | ICD-10-CM

## 2023-09-20 DIAGNOSIS — Z47.1 AFTERCARE FOLLOWING LEFT KNEE JOINT REPLACEMENT SURGERY: ICD-10-CM

## 2023-09-20 DIAGNOSIS — Z96.652 S/P REVISION OF TOTAL KNEE, LEFT: Primary | ICD-10-CM

## 2023-09-20 DIAGNOSIS — Z96.652 AFTERCARE FOLLOWING LEFT KNEE JOINT REPLACEMENT SURGERY: ICD-10-CM

## 2023-09-20 DIAGNOSIS — M79.18 MYOFASCIAL PAIN SYNDROME: ICD-10-CM

## 2023-09-20 PROCEDURE — 20552 NJX 1/MLT TRIGGER POINT 1/2: CPT | Performed by: FAMILY MEDICINE

## 2023-09-20 PROCEDURE — 20610 DRAIN/INJ JOINT/BURSA W/O US: CPT | Performed by: FAMILY MEDICINE

## 2023-09-20 PROCEDURE — 97112 NEUROMUSCULAR REEDUCATION: CPT

## 2023-09-20 PROCEDURE — 97110 THERAPEUTIC EXERCISES: CPT

## 2023-09-20 NOTE — TELEPHONE ENCOUNTER
Patient called asking if her injections she is to have this morning will interfere her getting steroid injection for her Bursitis . Warm transferred call to the office .

## 2023-09-20 NOTE — PROGRESS NOTES
Universal Protocol:  Consent: Verbal consent obtained. Consent given by: patient    Supporting Documentation  Indications: pain   Trigger Point Injections: single/multiple trigger point(s): 1-2 muscle groups     Procedure Details  Location(s):    Prep: patient was prepped and draped in usual sterile fashion  Needle size: 32 G  Patient tolerance: patient tolerated the procedure well with no immediate complications  Additional procedure details: Using aseptic technique areas along the left IT band cleansed with Betadine-multiple trigger points (5)  injected along the left IT band. Sarapin 10 ML and Lidocaine 1% 5 ML. Large joint arthrocentesis: L greater trochanteric bursa  Universal Protocol:  Consent: Verbal consent obtained. Risks and benefits: risks, benefits and alternatives were discussed  Consent given by: patient  Site marked: the operative site was marked  Supporting Documentation  Indications: pain (Left trochanteric bursitis)   Procedure Details  Location: hip - L greater trochanteric bursa  Preparation: Betadine. Needle size: 22 G  Ultrasound guidance: no    Patient tolerance: patient tolerated the procedure well with no immediate complications  Dressing:  Sterile dressing applied    Sarapin 2 mL and Lidocaine 1% 1 mL. Sarika Piper was seen today for injections.     Diagnoses and all orders for this visit:    Trochanteric bursitis of left hip  -     Large joint arthrocentesis: L greater trochanteric bursa  -     sarapin injection 2 mL    Myofascial pain syndrome  -     Trigger Point Injection  -     sarapin injection 10 mL

## 2023-09-20 NOTE — PROGRESS NOTES
Daily Note     Today's date: 2023  Patient name: Je Morris  : 1945  MRN: [id number]  Referring provider: Abhi Raygoza MD  Dx:   Encounter Diagnosis     ICD-10-CM    1. S/P revision of total knee, left  Z96.652       2. Aftercare following left knee joint replacement surgery  Z47.1     K99.246                      Subjective: Patient denies pain. Some edema present into LE. Objective: See treatment diary below      Assessment: Tolerated treatment well. ROM slowly progressing, visibly increased flexion following the addition of step stretch. Ext WFL. No issue with charted strength exercises. Patient demonstrated fatigue post treatment and would benefit from continued PT      Plan: Progress treatment as tolerated.        Precautions:   Past Medical History:   Diagnosis Date   • Actinic keratosis     last assessed -    • Arthritis    • Atrial fibrillation with rapid ventricular response ([address])     last assessed - 2016   • Basal cell carcinoma    • Benign colon polyp     last assessed - 2015   • CHF (congestive heart failure) ([address]) 2022   • Disease of thyroid gland    • Effusion of knee joint right     Resolved - [id number]   • Esophageal reflux    • Fibromyalgia     last assessed - 2017   • Fibromyalgia, primary    • GERD (gastroesophageal reflux disease)    • Hypertension    • Palpitations     last assessed - 2013   • Peroneal tendonitis, right     last assessed - 2013   • Right lumbar radiculopathy 2016   • Thyroid cancer [facility])    • Thyroid nodule    • Trochanteric bursitis of left hip 2018           Manuals          Knee prom  TE MB JEANNIE                                                Neuro Re-Ed             Quad set Hep 20x            saq Hep 2x10 2x10 2x10 1# 2x10         laq  2x10 2x10 2x10 3"         slr  2x10 2x10 2x10         SL abd  2x10 2x10 2x10         SLS    3x30"                      Ther Ex             Heel slide  Hep 20x 20x 20x 20x c/ strap 20x active          Passive Knee ext stretch  1'x2 1'x2 WNL         HSS  seated 3x30" seated 3x30" HEP today         Mini squats    2x10 2x10         STS    2x10 raised hi/lo no UE         Step stretch    10"x10                                   Ther Activity                                       Gait Training                                       Modalities

## 2023-09-21 ENCOUNTER — REMOTE DEVICE CLINIC VISIT (OUTPATIENT)
Dept: CARDIOLOGY CLINIC | Facility: CLINIC | Age: 78
End: 2023-09-21
Payer: MEDICARE

## 2023-09-21 DIAGNOSIS — Z95.0 CARDIAC PACEMAKER IN SITU: Primary | ICD-10-CM

## 2023-09-21 PROCEDURE — 93294 REM INTERROG EVL PM/LDLS PM: CPT | Performed by: INTERNAL MEDICINE

## 2023-09-21 PROCEDURE — 93296 REM INTERROG EVL PM/IDS: CPT | Performed by: INTERNAL MEDICINE

## 2023-09-21 NOTE — PROGRESS NOTES
Results for orders placed or performed in visit on 09/21/23   Cardiac EP device report    Narrative    MDT-DUAL CHAMBER PPM (AAIR-DDDR MODE)/ ACTIVE SYSTEM IS MRI CONDITIONAL  CARELINK TRANSMISSION: BATTERY STATUS "10 YRS." AP 79%  0%. ALL AVAILABLE LEAD PARAMETERS WITHIN NORMAL LIMITS. NO SIGNIFICANT HIGH RATE EPISODES. NORMAL DEVICE FUNCTION.  NC

## 2023-09-25 ENCOUNTER — OFFICE VISIT (OUTPATIENT)
Dept: PHYSICAL THERAPY | Facility: MEDICAL CENTER | Age: 78
End: 2023-09-25
Payer: MEDICARE

## 2023-09-25 DIAGNOSIS — F33.0 MILD EPISODE OF RECURRENT MAJOR DEPRESSIVE DISORDER (HCC): ICD-10-CM

## 2023-09-25 DIAGNOSIS — Z47.1 AFTERCARE FOLLOWING LEFT KNEE JOINT REPLACEMENT SURGERY: ICD-10-CM

## 2023-09-25 DIAGNOSIS — Z96.652 AFTERCARE FOLLOWING LEFT KNEE JOINT REPLACEMENT SURGERY: ICD-10-CM

## 2023-09-25 DIAGNOSIS — Z96.652 S/P REVISION OF TOTAL KNEE, LEFT: Primary | ICD-10-CM

## 2023-09-25 PROCEDURE — 97112 NEUROMUSCULAR REEDUCATION: CPT

## 2023-09-25 PROCEDURE — 97140 MANUAL THERAPY 1/> REGIONS: CPT

## 2023-09-25 PROCEDURE — 97110 THERAPEUTIC EXERCISES: CPT

## 2023-09-25 RX ORDER — DULOXETIN HYDROCHLORIDE 30 MG/1
CAPSULE, DELAYED RELEASE ORAL
Qty: 30 CAPSULE | Refills: 2 | Status: SHIPPED | OUTPATIENT
Start: 2023-09-25

## 2023-09-25 NOTE — PROGRESS NOTES
Daily Note     Today's date: 2023  Patient name: Jackie Higgins  : 1945  MRN: [id number]  Referring provider: Steffanie Moura MD  Dx:   Encounter Diagnosis     ICD-10-CM    1. S/P revision of total knee, left  Z96.652       2. Aftercare following left knee joint replacement surgery  Z47.1     Z96.652           Start Time: 1100  Stop Time: 1141  Total time in clinic (min): 41 minutes    Subjective: Patient reports her knee is doing well. She reports she has no pain just some stiffness with going up and down steps. Objective: See treatment diary below      Assessment: Tolerated treatment well. Progressed exercises, used 1# cuff weight for laq to assist with quad strength. Additionally implemented step up exercise to a reduced step height to assist with stair performance at home. Slight compensation present. Patient completes exercises without pain. Patient will continue to benefit from skilled physical therapy. Plan: Progress treatment as tolerated.        Precautions:   Past Medical History:   Diagnosis Date   • Actinic keratosis     last assessed -    • Arthritis    • Atrial fibrillation with rapid ventricular response ([address])     last assessed - 2016   • Basal cell carcinoma    • Benign colon polyp     last assessed - 2015   • CHF (congestive heart failure) ([address]) 2022   • Disease of thyroid gland    • Effusion of knee joint right     Resolved - [id number]   • Esophageal reflux    • Fibromyalgia     last assessed - 2017   • Fibromyalgia, primary    • GERD (gastroesophageal reflux disease)    • Hypertension    • Palpitations     last assessed - 2013   • Peroneal tendonitis, right     last assessed - 2013   • Right lumbar radiculopathy 2016   • Thyroid cancer ([address])    • Thyroid nodule    • Trochanteric bursitis of left hip 2018           Manuals         Knee prom  TE EVA VERMA Neuro Re-Ed             Quad set Hep 20x            saq Hep 2x10 2x10 2x10 1# 2x10 1# 2x10        laq  2x10 2x10 2x10 3" 1# 2x10        slr  2x10 2x10 2x10 2x10        SL abd  2x10 2x10 2x10 2x10        SLS    3x30"                      Ther Ex             Heel slide  Hep 20x 20x 20x 20x c/ strap 20x active  20x with strap        Passive Knee ext stretch  1'x2 1'x2 WNL         HSS  seated 3x30" seated 3x30" HEP today 3x30"        Mini squats    2x10 2x10 2x10        STS    2x10 raised hi/lo no UE 2x10 chair        Step stretch    10"x10 10x10"        Step up     20x 6" step                     Ther Activity                                       Gait Training                                       Modalities

## 2023-09-27 ENCOUNTER — OFFICE VISIT (OUTPATIENT)
Dept: PHYSICAL THERAPY | Facility: MEDICAL CENTER | Age: 78
End: 2023-09-27
Payer: MEDICARE

## 2023-09-27 DIAGNOSIS — Z47.1 AFTERCARE FOLLOWING LEFT KNEE JOINT REPLACEMENT SURGERY: ICD-10-CM

## 2023-09-27 DIAGNOSIS — Z96.652 AFTERCARE FOLLOWING LEFT KNEE JOINT REPLACEMENT SURGERY: ICD-10-CM

## 2023-09-27 DIAGNOSIS — Z96.652 S/P REVISION OF TOTAL KNEE, LEFT: Primary | ICD-10-CM

## 2023-09-27 PROCEDURE — 97110 THERAPEUTIC EXERCISES: CPT

## 2023-09-27 PROCEDURE — 97140 MANUAL THERAPY 1/> REGIONS: CPT

## 2023-09-27 PROCEDURE — 97112 NEUROMUSCULAR REEDUCATION: CPT

## 2023-09-27 NOTE — PROGRESS NOTES
Daily Note     Today's date: 2023  Patient name: Sina Schofield  : 1945  MRN: [id number]  Referring provider: Channing Braga MD  Dx:   Encounter Diagnosis     ICD-10-CM    1. S/P revision of total knee, left  Z96.652       2. Aftercare following left knee joint replacement surgery  Z47.1     Z96.652           Start Time: 872  Stop Time: 1225  Total time in clinic (min): 40 minutes    Subjective: Patient reports no pain at the start of the session. She reports she did have some soreness following last session but it is better today. She reports to therapy without her cane, which she reports she has no issues walking without. Objective: See treatment diary below      Assessment: Tolerated treatment well. Added seated knee flexion stretch to patient program to further help with regaining flexion motion. Patient performs well with exercise. Patient has no pain or symptom increase with exercises today. She will continue to benefit from skilled physical therapy and will be progressed as tolerated. Plan: Progress treatment as tolerated.        Precautions:   Past Medical History:   Diagnosis Date   • Actinic keratosis     last assessed - [id number]   • Arthritis    • Atrial fibrillation with rapid ventricular response ([address])     last assessed - 2016   • Basal cell carcinoma    • Benign colon polyp     last assessed - 2015   • CHF (congestive heart failure) ([address]) 2022   • Disease of thyroid gland    • Effusion of knee joint right     Resolved - [id number]   • Esophageal reflux    • Fibromyalgia     last assessed - [id number]   • Fibromyalgia, primary    • GERD (gastroesophageal reflux disease)    • Hypertension    • Palpitations     last assessed - 2013   • Peroneal tendonitis, right     last assessed - 2013   • Right lumbar radiculopathy 2016   • Thyroid cancer [facility])    • Thyroid nodule    • Trochanteric bursitis of left hip 2018           Manuals  9/25 9/27       Knee prom  TE MB JEANNIE TE TE                                              Neuro Re-Ed             Quad set Hep 20x            saq Hep 2x10 2x10 2x10 1# 2x10 1# 2x10 1# 2x10       laq  2x10 2x10 2x10 3" 1# 2x10 1# 2x10       slr  2x10 2x10 2x10 2x10 2x10       SL abd  2x10 2x10 2x10 2x10 2x10       SLS    3x30"                      Ther Ex             Heel slide  Hep 20x 20x 20x 20x c/ strap 20x active  20x with strap 20x with strap       Passive Knee ext stretch  1'x2 1'x2 WNL         HSS  seated 3x30" seated 3x30" HEP today 3x30" 3x30"       Mini squats    2x10 2x10         STS    2x10 raised hi/lo no UE 2x10 chair 2x10 chair       Step stretch    10"x10 10x10" 10x10"       Step up     20x 6" step 20x 6" step       Seated knee flexion      10x5"       Ther Activity                                       Gait Training                                       Modalities

## 2023-09-29 ENCOUNTER — OFFICE VISIT (OUTPATIENT)
Dept: FAMILY MEDICINE CLINIC | Facility: CLINIC | Age: 78
End: 2023-09-29
Payer: MEDICARE

## 2023-09-29 VITALS
BODY MASS INDEX: 29.92 KG/M2 | HEIGHT: 61 IN | HEART RATE: 64 BPM | TEMPERATURE: 97.5 F | WEIGHT: 158.5 LBS | SYSTOLIC BLOOD PRESSURE: 124 MMHG | DIASTOLIC BLOOD PRESSURE: 74 MMHG | OXYGEN SATURATION: 100 %

## 2023-09-29 DIAGNOSIS — R39.9 UTI SYMPTOMS: Primary | ICD-10-CM

## 2023-09-29 LAB
BACTERIA UR QL AUTO: ABNORMAL /HPF
BILIRUB UR QL STRIP: NEGATIVE
CLARITY UR: ABNORMAL
COLOR UR: ABNORMAL
GLUCOSE UR STRIP-MCNC: NEGATIVE MG/DL
HGB UR QL STRIP.AUTO: ABNORMAL
KETONES UR STRIP-MCNC: NEGATIVE MG/DL
LEUKOCYTE ESTERASE UR QL STRIP: ABNORMAL
NITRITE UR QL STRIP: NEGATIVE
NON-SQ EPI CELLS URNS QL MICRO: ABNORMAL /HPF
PH UR STRIP.AUTO: 6 [PH]
PROT UR STRIP-MCNC: ABNORMAL MG/DL
RBC #/AREA URNS AUTO: ABNORMAL /HPF
SL AMB  POCT GLUCOSE, UA: ABNORMAL
SL AMB LEUKOCYTE ESTERASE,UA: ABNORMAL
SL AMB POCT BILIRUBIN,UA: ABNORMAL
SL AMB POCT BLOOD,UA: ABNORMAL
SL AMB POCT CLARITY,UA: ABNORMAL
SL AMB POCT COLOR,UA: YELLOW
SL AMB POCT KETONES,UA: 1
SL AMB POCT NITRITE,UA: ABNORMAL
SL AMB POCT PH,UA: 5
SL AMB POCT SPECIFIC GRAVITY,UA: 1
SL AMB POCT URINE PROTEIN: ABNORMAL
SL AMB POCT UROBILINOGEN: ABNORMAL
SP GR UR STRIP.AUTO: 1.01 (ref 1–1.03)
UROBILINOGEN UR STRIP-ACNC: <2 MG/DL
WBC #/AREA URNS AUTO: ABNORMAL /HPF

## 2023-09-29 PROCEDURE — 87660 TRICHOMONAS VAGIN DIR PROBE: CPT | Performed by: STUDENT IN AN ORGANIZED HEALTH CARE EDUCATION/TRAINING PROGRAM

## 2023-09-29 PROCEDURE — 87480 CANDIDA DNA DIR PROBE: CPT | Performed by: STUDENT IN AN ORGANIZED HEALTH CARE EDUCATION/TRAINING PROGRAM

## 2023-09-29 PROCEDURE — 99213 OFFICE O/P EST LOW 20 MIN: CPT | Performed by: STUDENT IN AN ORGANIZED HEALTH CARE EDUCATION/TRAINING PROGRAM

## 2023-09-29 PROCEDURE — 87077 CULTURE AEROBIC IDENTIFY: CPT | Performed by: STUDENT IN AN ORGANIZED HEALTH CARE EDUCATION/TRAINING PROGRAM

## 2023-09-29 PROCEDURE — 87510 GARDNER VAG DNA DIR PROBE: CPT | Performed by: STUDENT IN AN ORGANIZED HEALTH CARE EDUCATION/TRAINING PROGRAM

## 2023-09-29 PROCEDURE — 87086 URINE CULTURE/COLONY COUNT: CPT | Performed by: STUDENT IN AN ORGANIZED HEALTH CARE EDUCATION/TRAINING PROGRAM

## 2023-09-29 PROCEDURE — 81002 URINALYSIS NONAUTO W/O SCOPE: CPT | Performed by: STUDENT IN AN ORGANIZED HEALTH CARE EDUCATION/TRAINING PROGRAM

## 2023-09-29 PROCEDURE — 81001 URINALYSIS AUTO W/SCOPE: CPT | Performed by: STUDENT IN AN ORGANIZED HEALTH CARE EDUCATION/TRAINING PROGRAM

## 2023-09-29 PROCEDURE — 87186 SC STD MICRODIL/AGAR DIL: CPT | Performed by: STUDENT IN AN ORGANIZED HEALTH CARE EDUCATION/TRAINING PROGRAM

## 2023-09-29 NOTE — ASSESSMENT & PLAN NOTE
2 week history of dysuria. Reports associated abnormal discharge. Denies hematuria, suprapubic pain, fever, chills. No hx of pylelonephritis, nephrolithiasis. Patient does not flank pain. Currently not sexually active. No concern for STI. Urine dip in the office negative for nitrites, positive for trace leukocytes.      - BV affirm and yeast performed in clinic - will follow up with results  - Will send UA and culture  - Will hold off on Abx for now while we await urine results

## 2023-09-29 NOTE — PROGRESS NOTES
Name: Sara Lizama      : 1945      MRN: [id number]  Encounter Provider: Cathy Bhandari MD  Encounter Date: 2023   Encounter department: [address] [place] [address]. UTI symptoms  Assessment & Plan:  2 week history of dysuria. Reports associated abnormal discharge. Denies hematuria, suprapubic pain, fever, chills. No hx of pylelonephritis, nephrolithiasis. Patient does not flank pain. Currently not sexually active. No concern for STI. Urine dip in the office negative for nitrites, positive for trace leukocytes. - BV affirm and yeast performed in clinic - will follow up with results  - Will send UA and culture  - Will hold off on Abx for now while we await urine results    Orders:  -     POCT urine dip  -     UA w Reflex to Microscopic w Reflex to Culture -Lab Collect; Future; Expected date: 2023  -     VAGINOSIS DNA PROBE (AFFIRM); Future         Subjective     2 week history of increased frequency and abnormal discharge. Reports voiding and having sensation of still having to empty bladder. Discharge is yellow, has a strange odor. Has history of incontinence. Review of Systems   Constitutional: Negative for chills and fever. HENT: Negative for congestion, ear pain, rhinorrhea and sinus pain. Eyes: Negative for visual disturbance. Respiratory: Negative for chest tightness, shortness of breath and wheezing. Cardiovascular: Negative for chest pain and palpitations. Gastrointestinal: Negative for abdominal pain, constipation, diarrhea and vomiting. Endocrine: Negative for polyuria. Genitourinary: Positive for dysuria. Musculoskeletal: Negative for arthralgias and myalgias. Neurological: Negative for dizziness, syncope and light-headedness.        Past Medical History:   Diagnosis Date   • Actinic keratosis     last assessed - [id number]   • Arthritis    • Atrial fibrillation with rapid ventricular response ([address])     last assessed - 2016 • Basal cell carcinoma    • Benign colon polyp     last assessed - 27Apr2015   • CHF (congestive heart failure) ([address]) 06/2022   • Disease of thyroid gland    • Effusion of knee joint right     Resolved - [id number]   • Esophageal reflux    • Fibromyalgia     last assessed - 27Dec2017   • Fibromyalgia, primary    • GERD (gastroesophageal reflux disease)    • Hypertension    • Palpitations     last assessed - 30Apr2013   • Peroneal tendonitis, right     last assessed - 02Oct2013   • Right lumbar radiculopathy 03/17/2016   • Thyroid cancer ([address])    • Thyroid nodule    • Trochanteric bursitis of left hip 03/09/2018     Past Surgical History:   Procedure Laterality Date   • CARDIAC ELECTROPHYSIOLOGY STUDY AND ABLATION  08/2022   • CATARACT EXTRACTION Bilateral    • COLONOSCOPY  03/2018   • COLONOSCOPY W/ POLYPECTOMY  2021    repeat in 5 years   • EYE SURGERY     • HYSTERECTOMY     • JOINT REPLACEMENT Left     knee   • IL NEUROPLASTY &/TRANSPOS MEDIAN NRV CARPAL TUNNE Right 11/14/2019    Procedure: RELEASE CARPAL TUNNEL;  Surgeon: Merna Lozano MD;  Location: [facility];  Service: Orthopedics   • IL TOTAL THYROID LOBECTOMY UNI W/WO ISTHMUSECTOMY Left 12/16/2020    Procedure: Left THYROID LOBECTOMY;  Surgeon: Radha Cerna MD;  Location: [facility];  Service: ENT   • RECTAL POLYPECTOMY     • REPLACEMENT TOTAL KNEE Left     last assessed - 27Apr2015   • TONSILLECTOMY     • TOTAL ABDOMINAL HYSTERECTOMY     • TUBAL LIGATION     • US GUIDED THYROID BIOPSY  10/14/2020     Family History   Problem Relation Age of Onset   • Heart disease Mother    • Diabetes Mother    • Heart disease Father    • Coronary artery disease Father    • Stroke Father         cerebrovascular accident   • Heart attack Father         myocardial infarction   • Sudden death Father         scd   • Other Family         Back disorder   • Coronary artery disease Family    • Neuropathy Family    • Osteoporosis Family    • No Known Problems Daughter    • No Known Problems Maternal Grandmother    • No Known Problems Maternal Grandfather    • No Known Problems Paternal Grandmother    • No Known Problems Paternal Grandfather    • Cancer Maternal Uncle    • Breast cancer Maternal Aunt 72   • No Known Problems Son    • No Known Problems Maternal Aunt    • No Known Problems Maternal Aunt    • No Known Problems Maternal Aunt    • No Known Problems Paternal Aunt    • No Known Problems Paternal Aunt    • Anuerysm Neg Hx    • Clotting disorder Neg Hx    • Arrhythmia Neg Hx    • Heart failure Neg Hx      Social History     Socioeconomic History   • Marital status:      Spouse name: None   • Number of children: None   • Years of education: None   • Highest education level: None   Occupational History   • None   Tobacco Use   • Smoking status: Never   • Smokeless tobacco: Never   Vaping Use   • Vaping Use: Never used   Substance and Sexual Activity   • Alcohol use: Not Currently   • Drug use: Never   • Sexual activity: Not Currently   Other Topics Concern   • None   Social History Narrative   • None     Social Determinants of Health     Financial Resource Strain: Low Risk  (9/19/2022)    Overall Financial Resource Strain (CARDIA)    • Difficulty of Paying Living Expenses: Not hard at all   Food Insecurity: Not on file   Transportation Needs: No Transportation Needs (9/19/2022)    PRAPARE - Transportation    • Lack of Transportation (Medical): No    • Lack of Transportation (Non-Medical):  No   Physical Activity: Not on file   Stress: Not on file   Social Connections: Not on file   Intimate Partner Violence: Not on file   Housing Stability: Not on file     Current Outpatient Medications on File Prior to Visit   Medication Sig   • apixaban (ELIQUIS) 5 mg Take 1 tablet (5 mg total) by mouth 2 (two) times a day   • Arthritis Pain Relief 650 MG CR tablet Take 650 mg by mouth 3 (three) times a day   • ascorbic acid (VITAMIN C) 500 mg tablet Take 500 mg by mouth daily    • Cetirizine HCl (ZyrTEC Allergy) 10 MG CAPS Take 10 mg by mouth in the morning   • Chromium Picolinate (CHROMIUM PICOLATE PO) Take 1 tablet by mouth daily   • diltiazem (CARDIZEM CD) 120 mg 24 hr capsule Take 1 capsule (120 mg total) by mouth daily   • DULoxetine (CYMBALTA) 30 mg delayed release capsule take 1 capsule by mouth once daily   • ezetimibe (ZETIA) 10 mg tablet take 1 tablet by mouth once daily   • famotidine (PEPCID) 20 mg tablet Take 20 mg by mouth every other day     • flecainide (TAMBOCOR) 100 mg tablet Take 1 tablet (100 mg total) by mouth 2 (two) times a day   • furosemide (LASIX) 20 mg tablet take 2 tablet by mouth daily if needed for shortness of breath WEIGHT GAIN 3 LBS IN 1 DAY OR LOWER LEG SWELLING   • gabapentin (NEURONTIN) 300 mg capsule Take 1 capsule (300 mg total) by mouth daily at bedtime   • ipratropium (ATROVENT) 0.03 % nasal spray 2 sprays into each nostril 3 (three) times a day Begin once per day, then progress to twice per day. Progress to three times a day as tolerated.    • lidocaine (XYLOCAINE) 5 % ointment Apply topically as needed for mild pain   • losartan (COZAAR) 50 mg tablet Take 1 tablet (50 mg total) by mouth 2 (two) times a day   • metoprolol succinate (TOPROL-XL) 25 mg 24 hr tablet Take 4 tablets (100 mg total) by mouth every 12 (twelve) hours   • rOPINIRole (REQUIP) 1 mg tablet Take 1 tablet (1 mg total) by mouth daily at bedtime   • TURMERIC PO Take 1 capsule by mouth 2 (two) times a day   • zolpidem (AMBIEN) 10 mg tablet Take 1 tablet (10 mg total) by mouth daily at bedtime as needed for sleep   • Cholecalciferol 50 MCG (2000 UT) CAPS Take 2,000 Units by mouth 2 (two) times a day (Patient not taking: Reported on 8/31/2023)     Allergies   Allergen Reactions   • Sotalol Other (See Comments)     Prolonged QTc leading to torsades de pointes    • Penicillins Other (See Comments)     As a child calcium deposit in the arm    • Ace Inhibitors GI Intolerance     Did feel well on it   • Omeprazole Abdominal Pain, Rash and Vomiting     stomach pain, vomiting, rash     Immunization History   Administered Date(s) Administered   • COVID-19 MODERNA VACC 0.5 ML IM 01/27/2021, 02/24/2021   • INFLUENZA 12/23/2015, 11/07/2016, 10/18/2017, 12/04/2018, 09/19/2022   • Influenza Split High Dose Preservative Free IM 10/01/2014, 12/23/2015, 11/07/2016, 10/18/2017   • Influenza, high dose seasonal 0.7 mL 12/04/2018, 09/26/2019, 09/08/2020, 11/03/2021, 09/19/2022   • Influenza, seasonal, injectable 10/16/2012   • Pneumococcal Conjugate 13-Valent 04/27/2015   • Pneumococcal Polysaccharide PPV23 03/29/2022       Objective     /74 (BP Location: Left arm, Patient Position: Sitting, Cuff Size: Large)   Pulse 64   Temp 97.5 °F (36.4 °C)   Ht 5' 1" (1.549 m)   Wt 71.9 kg (158 lb 8 oz)   LMP 02/01/1990 (Within Weeks)   SpO2 100%   BMI 29.95 kg/m²     Physical Exam  Exam conducted with a chaperone present. Constitutional:       Appearance: Normal appearance. HENT:      Head: Normocephalic and atraumatic. Nose: Nose normal.   Eyes:      Conjunctiva/sclera: Conjunctivae normal.   Cardiovascular:      Rate and Rhythm: Normal rate. Pulmonary:      Effort: Pulmonary effort is normal.   Genitourinary:     General: Normal vulva. Exam position: Lithotomy position. Labia:         Right: No rash or tenderness. Left: No rash or tenderness. Urethra: No prolapse. Musculoskeletal:         General: Normal range of motion. Cervical back: Normal range of motion. Lymphadenopathy:      Lower Body: No right inguinal adenopathy. Skin:     General: Skin is warm and dry. Neurological:      Mental Status: She is alert and oriented to person, place, and time.    Psychiatric:         Behavior: Behavior normal.       Yemi Bob MD

## 2023-09-30 LAB
CANDIDA RRNA VAG QL PROBE: NEGATIVE
G VAGINALIS RRNA GENITAL QL PROBE: NEGATIVE
T VAGINALIS RRNA GENITAL QL PROBE: NEGATIVE

## 2023-10-01 LAB — BACTERIA UR CULT: ABNORMAL

## 2023-10-02 ENCOUNTER — TELEPHONE (OUTPATIENT)
Age: 78
End: 2023-10-02

## 2023-10-02 ENCOUNTER — APPOINTMENT (OUTPATIENT)
Dept: PHYSICAL THERAPY | Facility: MEDICAL CENTER | Age: 78
End: 2023-10-02
Payer: MEDICARE

## 2023-10-02 DIAGNOSIS — N30.00 ACUTE CYSTITIS WITHOUT HEMATURIA: Primary | ICD-10-CM

## 2023-10-02 RX ORDER — NITROFURANTOIN 25; 75 MG/1; MG/1
100 CAPSULE ORAL 2 TIMES DAILY
Qty: 10 CAPSULE | Refills: 0 | Status: SHIPPED | OUTPATIENT
Start: 2023-10-02 | End: 2023-10-07

## 2023-10-02 NOTE — TELEPHONE ENCOUNTER
Patient called to follow up on urinalysis results from Friday and was warm transferred to clinical to review.

## 2023-10-02 NOTE — TELEPHONE ENCOUNTER
Pt called back for her urine results. Providers result note given. Pt aware and understands. No further action needed.

## 2023-10-04 ENCOUNTER — OFFICE VISIT (OUTPATIENT)
Dept: PHYSICAL THERAPY | Facility: MEDICAL CENTER | Age: 78
End: 2023-10-04
Payer: MEDICARE

## 2023-10-04 DIAGNOSIS — Z96.652 AFTERCARE FOLLOWING LEFT KNEE JOINT REPLACEMENT SURGERY: ICD-10-CM

## 2023-10-04 DIAGNOSIS — Z96.652 S/P REVISION OF TOTAL KNEE, LEFT: Primary | ICD-10-CM

## 2023-10-04 DIAGNOSIS — Z47.1 AFTERCARE FOLLOWING LEFT KNEE JOINT REPLACEMENT SURGERY: ICD-10-CM

## 2023-10-04 PROCEDURE — 97112 NEUROMUSCULAR REEDUCATION: CPT

## 2023-10-04 PROCEDURE — 97110 THERAPEUTIC EXERCISES: CPT

## 2023-10-04 PROCEDURE — 97140 MANUAL THERAPY 1/> REGIONS: CPT

## 2023-10-04 NOTE — PROGRESS NOTES
Daily Note     Today's date: 10/4/2023  Patient name: Coleen Fisher  : 1945  MRN: [id number]  Referring provider: Mango Mcpherson MD  Dx:   Encounter Diagnosis     ICD-10-CM    1. S/P revision of total knee, left  Z96.652       2. Aftercare following left knee joint replacement surgery  Z47.1     Z96.652           Start Time: 7241  Stop Time: 1230  Total time in clinic (min): 45 minutes    Subjective: Patient reports some stiffness in BL knees when she woke up this morning but overall no pain today or recently. She reports being able to work in her garden yesterday for 1.5 hours. Objective: See treatment diary below      Assessment: Tolerated treatment well. Implemented stationary bike today which the patient tolerated well. Continued with prescribes exercises following this. Used 1# ankle cuff weight for leg raises. Patient tolerates this well. Cues used for hold times of stretches. She will continue to benefit from skilled physical therapy and will be progressed as tolerated. Plan: Progress treatment as tolerated.        Precautions:   Past Medical History:   Diagnosis Date   • Actinic keratosis     last assessed -    • Arthritis    • Atrial fibrillation with rapid ventricular response ([address])     last assessed - 2016   • Basal cell carcinoma    • Benign colon polyp     last assessed - 2015   • CHF (congestive heart failure) ([address]) 2022   • Disease of thyroid gland    • Effusion of knee joint right     Resolved - [id number]   • Esophageal reflux    • Fibromyalgia     last assessed - [id number]   • Fibromyalgia, primary    • GERD (gastroesophageal reflux disease)    • Hypertension    • Palpitations     last assessed - 2013   • Peroneal tendonitis, right     last assessed - 2013   • Right lumbar radiculopathy 2016   • Thyroid cancer [facility])    • Thyroid nodule    • Trochanteric bursitis of left hip 2018           Manuals 9/11 9/13 9/18 9/20 9/25 9/27 10/4 Knee prom  TE MB JEANNIE TE TE TE                                             Neuro Re-Ed             Quad set Hep 20x            saq Hep 2x10 2x10 2x10 1# 2x10 1# 2x10 1# 2x10 1# 2x10      laq  2x10 2x10 2x10 3" 1# 2x10 1# 2x10 1# 2x10      slr  2x10 2x10 2x10 2x10 2x10 1# 2x10      SL abd  2x10 2x10 2x10 2x10 2x10 1# 2x10      SLS    3x30"                      Ther Ex             Heel slide  Hep 20x 20x 20x 20x c/ strap 20x active  20x with strap 20x with strap 20x with strap      Passive Knee ext stretch  1'x2 1'x2 WNL         HSS  seated 3x30" seated 3x30" HEP today 3x30" 3x30" 3x30"      Mini squats    2x10 2x10         STS    2x10 raised hi/lo no UE 2x10 chair 2x10 chair 2x10 chair      Step stretch    10"x10 10x10" 10x10" 10x10"      Step up     20x 6" step 20x 6" step 20x 6" step      Seated knee flexion      10x5" 10x5"      bike       5'      Ther Activity                                       Gait Training                                       Modalities

## 2023-10-06 ENCOUNTER — OFFICE VISIT (OUTPATIENT)
Dept: PHYSICAL THERAPY | Facility: MEDICAL CENTER | Age: 78
End: 2023-10-06
Payer: MEDICARE

## 2023-10-06 DIAGNOSIS — Z96.652 S/P REVISION OF TOTAL KNEE, LEFT: Primary | ICD-10-CM

## 2023-10-06 DIAGNOSIS — Z96.652 AFTERCARE FOLLOWING LEFT KNEE JOINT REPLACEMENT SURGERY: ICD-10-CM

## 2023-10-06 DIAGNOSIS — Z47.1 AFTERCARE FOLLOWING LEFT KNEE JOINT REPLACEMENT SURGERY: ICD-10-CM

## 2023-10-06 PROCEDURE — 97140 MANUAL THERAPY 1/> REGIONS: CPT

## 2023-10-06 PROCEDURE — 97110 THERAPEUTIC EXERCISES: CPT

## 2023-10-06 PROCEDURE — 97112 NEUROMUSCULAR REEDUCATION: CPT

## 2023-10-06 NOTE — PROGRESS NOTES
Daily Note     Today's date: 10/6/2023  Patient name: Sara Lizama  : 1945  MRN: [id number]  Referring provider: Cruz Dancer, MD  Dx:   Encounter Diagnosis     ICD-10-CM    1. S/P revision of total knee, left  Z96.652       2. Aftercare following left knee joint replacement surgery  Z47.1     Z96.652           Start Time: 1130  Stop Time: 1215  Total time in clinic (min): 45 minutes    Subjective: Patient reports no pain or complaints today. She reports her knee is doing "good". Objective: See treatment diary below      Assessment: Tolerated treatment well. Patient will continue to benefit from skilled physical therapy in order to maximize rom, strength, and function post surgery. Increased hold times of seated knee flexion stretch today to work on motion. Patient tolerated this well. Progress treatment as tolerated. Patient has no complaints post session. Plan: Progress treatment as tolerated.        Precautions:   Past Medical History:   Diagnosis Date   • Actinic keratosis     last assessed -    • Arthritis    • Atrial fibrillation with rapid ventricular response ([address])     last assessed - 2016   • Basal cell carcinoma    • Benign colon polyp     last assessed - 2015   • CHF (congestive heart failure) ([address]) 2022   • Disease of thyroid gland    • Effusion of knee joint right     Resolved - [id number]   • Esophageal reflux    • Fibromyalgia     last assessed - 2017   • Fibromyalgia, primary    • GERD (gastroesophageal reflux disease)    • Hypertension    • Palpitations     last assessed - 2013   • Peroneal tendonitis, right     last assessed - 2013   • Right lumbar radiculopathy 2016   • Thyroid cancer ([address])    • Thyroid nodule    • Trochanteric bursitis of left hip 2018           Manuals 9/11 9/13 9/18 9/20 9/25 9/27 10/4 10/6     Knee prom  TE MB JEANNIE TE TE TE TE                                            Neuro Re-Ed             Quad set Hep 20x saq Hep 2x10 2x10 2x10 1# 2x10 1# 2x10 1# 2x10 1# 2x10 1# 2x10     laq  2x10 2x10 2x10 3" 1# 2x10 1# 2x10 1# 2x10 1# 2x10     slr  2x10 2x10 2x10 2x10 2x10 1# 2x10 1# 2x10     SL abd  2x10 2x10 2x10 2x10 2x10 1# 2x10 1# 2x10     SLS    3x30"                      Ther Ex             Heel slide  Hep 20x 20x 20x 20x c/ strap 20x active  20x with strap 20x with strap 20x with strap 20x with strap     Passive Knee ext stretch  1'x2 1'x2 WNL         HSS  seated 3x30" seated 3x30" HEP today 3x30" 3x30" 3x30" 3x30"     Mini squats    2x10 2x10         STS    2x10 raised hi/lo no UE 2x10 chair 2x10 chair 2x10 chair 2x10 chair     Step stretch    10"x10 10x10" 10x10" 10x10" 10x10"     Step up     20x 6" step 20x 6" step 20x 6" step 20x 6" step fwd/lat     Seated knee flexion      10x5" 10x5" 10x10"     bike       5' 5'     Ther Activity                                       Gait Training                                       Modalities

## 2023-10-09 ENCOUNTER — OFFICE VISIT (OUTPATIENT)
Dept: PHYSICAL THERAPY | Facility: MEDICAL CENTER | Age: 78
End: 2023-10-09
Payer: MEDICARE

## 2023-10-09 DIAGNOSIS — Z96.652 AFTERCARE FOLLOWING LEFT KNEE JOINT REPLACEMENT SURGERY: ICD-10-CM

## 2023-10-09 DIAGNOSIS — Z47.1 AFTERCARE FOLLOWING LEFT KNEE JOINT REPLACEMENT SURGERY: ICD-10-CM

## 2023-10-09 DIAGNOSIS — Z96.652 S/P REVISION OF TOTAL KNEE, LEFT: Primary | ICD-10-CM

## 2023-10-09 PROCEDURE — 97140 MANUAL THERAPY 1/> REGIONS: CPT

## 2023-10-09 PROCEDURE — 97112 NEUROMUSCULAR REEDUCATION: CPT

## 2023-10-09 PROCEDURE — 97110 THERAPEUTIC EXERCISES: CPT

## 2023-10-09 NOTE — PROGRESS NOTES
Daily Note     Today's date: 10/9/2023  Patient name: Amish Bautista  : 1945  MRN: [id number]  Referring provider: Sydni Lancaster MD  Dx:   Encounter Diagnosis     ICD-10-CM    1. S/P revision of total knee, left  Z96.652       2. Aftercare following left knee joint replacement surgery  Z47.1     Z96.652           Start Time: 1140  Stop Time: 1227  Total time in clinic (min): 47 minutes    Subjective: Patient reports mild stiffness in her knee in the morning which goes away when she gets up and moves around. Objective: See treatment diary below      Assessment: Tolerated treatment well. Progressed step ups to 8" step today. Patient performs well, does have some fatigue following this. Continued with following rom and strengthening exercises with good tolerance. Patient will continue to benefit from skilled physical therapy. Plan: Progress treatment as tolerated. ReEvaluation next session.       Precautions:   Past Medical History:   Diagnosis Date   • Actinic keratosis     last assessed - [id number]   • Arthritis    • Atrial fibrillation with rapid ventricular response ([address])     last assessed - 2016   • Basal cell carcinoma    • Benign colon polyp     last assessed - 2015   • CHF (congestive heart failure) ([address]) 2022   • Disease of thyroid gland    • Effusion of knee joint right     Resolved - [id number]   • Esophageal reflux    • Fibromyalgia     last assessed - 2017   • Fibromyalgia, primary    • GERD (gastroesophageal reflux disease)    • Hypertension    • Palpitations     last assessed - 2013   • Peroneal tendonitis, right     last assessed - 2013   • Right lumbar radiculopathy 2016   • Thyroid cancer ([address])    • Thyroid nodule    • Trochanteric bursitis of left hip 2018         PT 1:1 1145- 1227, independent exercise on bike [id number]  Manuals 9/11 9/13 9/18 9/20 9/25 9/27 10/4 10/6 10/9    Knee prom  TE MB JEANNIE TE TE TE TE TE Neuro Re-Ed             Quad set Hep 20x            saq Hep 2x10 2x10 2x10 1# 2x10 1# 2x10 1# 2x10 1# 2x10 1# 2x10 1.5# 2x10    laq  2x10 2x10 2x10 3" 1# 2x10 1# 2x10 1# 2x10 1# 2x10 1.5# 2x10    slr  2x10 2x10 2x10 2x10 2x10 1# 2x10 1# 2x10 1# 2x10    SL abd  2x10 2x10 2x10 2x10 2x10 1# 2x10 1# 2x10 1# 2x10    SLS    3x30"                      Ther Ex             Heel slide  Hep 20x 20x 20x 20x c/ strap 20x active  20x with strap 20x with strap 20x with strap 20x with strap 20x with strap    Passive Knee ext stretch  1'x2 1'x2 WNL         HSS  seated 3x30" seated 3x30" HEP today 3x30" 3x30" 3x30" 3x30" 3x30"    Mini squats    2x10 2x10         STS    2x10 raised hi/lo no UE 2x10 chair 2x10 chair 2x10 chair 2x10 chair 1x10 chair    Step stretch    10"x10 10x10" 10x10" 10x10" 10x10" 10x10"    Step up     20x 6" step 20x 6" step 20x 6" step 20x 6" step fwd/lat 20x 8" step fwd/lat    Seated knee flexion      10x5" 10x5" 10x10" 10x10"    bike       5' 5' 5'    Ther Activity                                       Gait Training                                       Modalities

## 2023-10-11 ENCOUNTER — OFFICE VISIT (OUTPATIENT)
Dept: PHYSICAL THERAPY | Facility: MEDICAL CENTER | Age: 78
End: 2023-10-11
Payer: MEDICARE

## 2023-10-11 DIAGNOSIS — Z47.1 AFTERCARE FOLLOWING LEFT KNEE JOINT REPLACEMENT SURGERY: ICD-10-CM

## 2023-10-11 DIAGNOSIS — Z96.652 AFTERCARE FOLLOWING LEFT KNEE JOINT REPLACEMENT SURGERY: ICD-10-CM

## 2023-10-11 DIAGNOSIS — Z96.652 S/P REVISION OF TOTAL KNEE, LEFT: Primary | ICD-10-CM

## 2023-10-11 PROCEDURE — 97530 THERAPEUTIC ACTIVITIES: CPT

## 2023-10-11 PROCEDURE — 97112 NEUROMUSCULAR REEDUCATION: CPT

## 2023-10-11 PROCEDURE — 97110 THERAPEUTIC EXERCISES: CPT

## 2023-10-11 NOTE — PROGRESS NOTES
PT ReEvalution    Today's date: 10/11/2023  Patient name: Kyle Watson  : 1945  MRN: [id number]  Referring provider: Sarah Sampson MD  Dx:   Encounter Diagnosis     ICD-10-CM    1. S/P revision of total knee, left  Z96.652       2. Aftercare following left knee joint replacement surgery  Z47.1     Z96.652           Start Time: 1400  Stop Time: 1443  Total time in clinic (min): 43 minutes    Assessment  Assessment details: ReEvaluation  Patient reports to therapy having completed ten sessions over the course of the past one month. Patient was referred to therapy S/P revision of total knee, left which was performed on 23. Patient displays progress with therapy including in knee strength, gait mechanics, and gait speed. This is evident through mmt, gait observational analysis, and 10 meter walk test. Knee ROM has also shown slight improvements. Knee motion was measured today at 0-92 actively and 0-96 passively. This appears to be at patient's preop baseline level. Per patient subjective reports, patient did have limited rom following initial TKA in  and reports she had two manipulations performed. Pre op ortho note was able to be reviewed and pre op rom was measured at approximately 0-90 degrees. Overall, patient has made steady progress with therapy and has improved functional levels with adls. Patient has been able to safely transition off of spc to walking with no AD. While progress has been made however patient still reporting some unsteadiness/decreased balance and stability with her knee with adls. Patient will continue to benefit from skilled physical therapy in order to work on her knee strength and stability, further work on knee rom, and maximize function post op.   Impairments: abnormal or restricted ROM, impaired balance, impaired physical strength and pain with function    Symptom irritability: lowUnderstanding of Dx/Px/POC: good   Prognosis: good    Goals  STG  -Patient will be IND with basic level HEP within 4 weeks in order to make improvements at home-met  - patient will have improved knee flexion rom of 10 degrees within 5 weeks- not met  - patient will have improved gait speed of 0.1 meter/sec within 5 weeks in order to improve function with adls- met      LTG  -Patient will be IND with an advanced level HEP within 8 weeks in order to make improvements at home - progressing  - Patient will reach her FOTO score goal within 8 weeks- progressing  - patient will have improved knee flexion rom of 15 degrees within 10 weeks- not met  - patient will have improved gait speed of 0.2 meter/sec within 10 weeks in order to improve function with adls and have gait speed >1.0 m/sec- met  - patient will not be classified as a fall risk per tug test times within 8 weeks- met      Plan  Plan details: Skilled PT to improve strength, ROM, flexibility, endurance, pain, and function. Patient would benefit from: PT eval and skilled physical therapy  Referral necessary: No  Planned therapy interventions: IASTM, manual therapy, massage, muscle pump exercises, neuromuscular re-education, patient education, ADL retraining, activity modification, balance/weight bearing training, balance, body mechanics training, functional ROM exercises, flexibility, gait training, graded activity, graded exercise, graded motor, home exercise program, strengthening, stretching, therapeutic activities, therapeutic exercise, therapeutic training and transfer training  Frequency: 2x week  Duration in weeks: 12  Plan of Care beginning date: 9/11/2023  Plan of Care expiration date: 12/4/2023  Treatment plan discussed with: patient        Subjective Evaluation    History of Present Illness  Mechanism of injury: ReEvaluation  Patient reports her knee is "a lot better" since starting therapy. She is able to do more activities at home and has returned to gardening again. Pain has been well controlled.  She reports she has been able to transition off her spc. She feels her motion has been better. She reports wants to continue working on balance/stability of her knee. Not a recurrent problem   Quality of life: good    Patient Goals  Patient goals for therapy: decreased pain, improved balance, increased motion, independence with ADLs/IADLs, return to sport/leisure activities and increased strength    Pain  Current pain ratin  At best pain ratin  At worst pain rating: 3  Quality: tight  Relieving factors: rest and relaxation  Progression: improved          Objective     Neurological Testing     Sensation     Knee   Left Knee   Intact: Light touch    Right Knee   Intact: light touch     Additional Neurological Details  Dermatomes/myotomes wnl    Active Range of Motion   Left Knee   Flexion: 92 degrees   Extension: 0 degrees     Right Knee   Flexion: 110 degrees   Extension: 0 degrees   Left Ankle/Foot   Normal active range of motion    Right Ankle/Foot   Normal active range of motion    Additional Active Range of Motion Details  Patient reports limited knee rom following initial TKA in   as well as need for two manipulations.  Able to review preop ortho note in which baseline knee rom was measured at approximately 0-90 degrees    Passive Range of Motion   Left Knee   Flexion: 96 degrees   Extension: 0 degrees     Right Knee   Flexion: 112 degrees   Extension: 0 degrees     Strength/Myotome Testing     Left Knee   Flexion: 5  Extension: 5    Right Knee   Flexion: 5  Extension: 5    Left Ankle/Foot   Normal strength    Right Ankle/Foot   Normal strength    General Comments:      Knee Comments  Tug test - 12 seconds with spc  10 meter walk test- 10 meter/11.3sec=0.88 m/sec     ReEvaluation   Tug test- 8.75 seconds no AD  10 meter walk test- 10 meter/8.5sec= 1.17 m/sec no AD               Precautions:   Past Medical History:   Diagnosis Date    Actinic keratosis     last assessed - [id number]    Arthritis     Atrial fibrillation with rapid ventricular response (HCC)     last assessed - [id number]    Basal cell carcinoma     Benign colon polyp     last assessed - [id number]    CHF (congestive heart failure) ([address] ) 06/2022    Disease of thyroid gland     Effusion of knee joint right     Resolved - [id number]    Esophageal reflux     Fibromyalgia     last assessed - [id number]    Fibromyalgia, primary     GERD (gastroesophageal reflux disease)     Hypertension     Palpitations     last assessed - [id number]    Peroneal tendonitis, right     last assessed - 02Oct2013    Right lumbar radiculopathy 03/17/2016    Thyroid cancer ([address] )     Thyroid nodule     Trochanteric bursitis of left hip 03/09/2018         Manuals 9/11 9/13 9/18 9/20 9/25 9/27 10/4 10/6 10/9 10/11   Knee prom  TE MB JEANNIE TE TE TE TE TE                                           Neuro Re-Ed             Quad set Hep 20x            saq Hep 2x10 2x10 2x10 1# 2x10 1# 2x10 1# 2x10 1# 2x10 1# 2x10 1.5# 2x10 1.5# 2x10   laq  2x10 2x10 2x10 3" 1# 2x10 1# 2x10 1# 2x10 1# 2x10 1.5# 2x10 1.5# 2x10   slr  2x10 2x10 2x10 2x10 2x10 1# 2x10 1# 2x10 1# 2x10 1.5# 2x10   SL abd  2x10 2x10 2x10 2x10 2x10 1# 2x10 1# 2x10 1# 2x10 1.5# 2x10   SLS    3x30"      3x30"                Ther Ex             Heel slide  Hep 20x 20x 20x 20x c/ strap 20x active  20x with strap 20x with strap 20x with strap 20x with strap 20x with strap 20x with strap   Passive Knee ext stretch  1'x2 1'x2 WNL         HSS  seated 3x30" seated 3x30" HEP today 3x30" 3x30" 3x30" 3x30" 3x30" 3x30"   Mini squats    2x10 2x10         STS    2x10 raised hi/lo no UE 2x10 chair 2x10 chair 2x10 chair 2x10 chair 1x10 chair    Step stretch    10"x10 10x10" 10x10" 10x10" 10x10" 10x10" 10x10"   Step up     20x 6" step 20x 6" step 20x 6" step 20x 6" step fwd/lat 20x 8" step fwd/lat    Seated knee flexion      10x5" 10x5" 10x10" 10x10" 10x10"   bike       5' 5' 5'    Ther Activity             RE           TE 10'                Gait Training Modalities

## 2023-10-12 ENCOUNTER — TELEPHONE (OUTPATIENT)
Dept: CARDIOLOGY CLINIC | Facility: CLINIC | Age: 78
End: 2023-10-12

## 2023-10-12 NOTE — TELEPHONE ENCOUNTER
Jackie Reyes ,pt called for samples Eliquis . Could you please put 2 boxes' in front for her.       Thanks

## 2023-10-13 NOTE — TELEPHONE ENCOUNTER
LVM for Giovanna Riley that her two boxes of samples are ready for  until 4pm tonight and that we will be open again on Monday. Eliquis 5mg  Lot#: [id number]  Exp.  Date: 04/25  Count Dispensed: 2

## 2023-10-17 ENCOUNTER — OFFICE VISIT (OUTPATIENT)
Dept: PHYSICAL THERAPY | Facility: MEDICAL CENTER | Age: 78
End: 2023-10-17
Payer: MEDICARE

## 2023-10-17 DIAGNOSIS — Z47.1 AFTERCARE FOLLOWING LEFT KNEE JOINT REPLACEMENT SURGERY: ICD-10-CM

## 2023-10-17 DIAGNOSIS — Z96.652 AFTERCARE FOLLOWING LEFT KNEE JOINT REPLACEMENT SURGERY: ICD-10-CM

## 2023-10-17 DIAGNOSIS — Z96.652 S/P REVISION OF TOTAL KNEE, LEFT: Primary | ICD-10-CM

## 2023-10-17 PROCEDURE — 97112 NEUROMUSCULAR REEDUCATION: CPT

## 2023-10-17 PROCEDURE — 97140 MANUAL THERAPY 1/> REGIONS: CPT

## 2023-10-17 PROCEDURE — 97110 THERAPEUTIC EXERCISES: CPT

## 2023-10-17 NOTE — PROGRESS NOTES
Daily Note     Today's date: 10/17/2023  Patient name: Jerrod Lima  : 1945  MRN: [id number]  Referring provider: Gilbert Meeks MD  Dx:   Encounter Diagnosis     ICD-10-CM    1. S/P revision of total knee, left  Z96.652       2. Aftercare following left knee joint replacement surgery  Z47.1     Z96.652             Start Time: 70  Stop Time: 1015  Total time in clinic (min): 48 minutes    Subjective: Patient reports soreness following activity this past weekend as she was helping out at her [employer] and reports she spent all day standing and on her feet. Objective: See treatment diary below  Knee prom today   Flexion 102  Extension 0    Assessment: Tolerated treatment well. Progressed balance and stability with foam surface exercise. UE support used as needed. Patient tolerated this as well as all other exercises well. Fatigue still present with steps. Patient will continue to benefit from skilled physical therapy in order to maximize function post surgery. Plan: Progress treatment as tolerated.        Precautions:   Past Medical History:   Diagnosis Date    Actinic keratosis     last assessed - [id number]    Arthritis     Atrial fibrillation with rapid ventricular response ([address])     last assessed - [id number]    Basal cell carcinoma     Benign colon polyp     last assessed - 69Uby4505    CHF (congestive heart failure) ([address]) 2022    Disease of thyroid gland     Effusion of knee joint right     Resolved - [id number]    Esophageal reflux     Fibromyalgia     last assessed - 29Ajg0538    Fibromyalgia, primary     GERD (gastroesophageal reflux disease)     Hypertension     Palpitations     last assessed - 85Ugy1192    Peroneal tendonitis, right     last assessed - 11Aky7114    Right lumbar radiculopathy 2016    Thyroid cancer ([address])     Thyroid nodule     Trochanteric bursitis of left hip 2018         PT 1:1 1836-3456, independent exercise on bike [id number]  Manuals 10/17        10/9 10/11 Knee prom TE        TE                                           Neuro Re-Ed             Quad set             saq 1.5# 2x10        1.5# 2x10 1.5# 2x10   laq         1.5# 2x10 1.5# 2x10   slr 1.5# 2x10        1# 2x10 1.5# 2x10   SL abd 1.5# 2x10        1# 2x10 1.5# 2x10   SLS 3x30"         3x30"   Tandem gait foam  2 cycles at table            Ther Ex             Heel slide  20x 5" with strap        20x with strap 20x with strap   Passive Knee ext stretch             HSS 3x30"        3x30" 3x30"   Mini squats              STS         1x10 chair    Step stretch 10x10"        10x10" 10x10"   Step up 20x 8" step fwd/lat        20x 8" step fwd/lat    Seated knee flexion 10x10"        10x10" 10x10"   bike 5'        5'    Ther Activity             RE           TE 10'                Gait Training                                       Modalities

## 2023-10-18 DIAGNOSIS — I50.32 CHRONIC DIASTOLIC HEART FAILURE (HCC): ICD-10-CM

## 2023-10-18 DIAGNOSIS — I48.19 PERSISTENT ATRIAL FIBRILLATION (HCC): ICD-10-CM

## 2023-10-18 DIAGNOSIS — I10 ESSENTIAL HYPERTENSION: ICD-10-CM

## 2023-10-18 RX ORDER — METOPROLOL SUCCINATE 25 MG/1
100 TABLET, EXTENDED RELEASE ORAL EVERY 12 HOURS
Qty: 240 TABLET | Refills: 1 | Status: SHIPPED | OUTPATIENT
Start: 2023-10-18

## 2023-10-20 ENCOUNTER — APPOINTMENT (OUTPATIENT)
Dept: PHYSICAL THERAPY | Facility: MEDICAL CENTER | Age: 78
End: 2023-10-20
Payer: MEDICARE

## 2023-10-24 ENCOUNTER — OFFICE VISIT (OUTPATIENT)
Dept: PHYSICAL THERAPY | Facility: MEDICAL CENTER | Age: 78
End: 2023-10-24
Payer: MEDICARE

## 2023-10-24 DIAGNOSIS — Z47.1 AFTERCARE FOLLOWING LEFT KNEE JOINT REPLACEMENT SURGERY: ICD-10-CM

## 2023-10-24 DIAGNOSIS — Z96.652 AFTERCARE FOLLOWING LEFT KNEE JOINT REPLACEMENT SURGERY: ICD-10-CM

## 2023-10-24 DIAGNOSIS — Z96.652 S/P REVISION OF TOTAL KNEE, LEFT: Primary | ICD-10-CM

## 2023-10-24 PROCEDURE — 97140 MANUAL THERAPY 1/> REGIONS: CPT

## 2023-10-24 PROCEDURE — 97110 THERAPEUTIC EXERCISES: CPT

## 2023-10-24 PROCEDURE — 97112 NEUROMUSCULAR REEDUCATION: CPT

## 2023-10-24 NOTE — PROGRESS NOTES
Daily Note/Discharge    Today's date: 10/24/2023  Patient name: Jessica Eden  : 1945  MRN: [id number]  Referring provider: Hortencia Diez MD  Dx:   Encounter Diagnosis     ICD-10-CM    1. S/P revision of total knee, left  Z96.652       2. Aftercare following left knee joint replacement surgery  Z47.1     Z96.652               Start Time: 930  Stop Time: 1015  Total time in clinic (min): 45 minutes    Subjective: Patient reports she had a follow up with her doctor on 10/19 which went very well and states her doctor was "very pleased". Patients reports no pain or complaints today. She reports she is doing "good" with her daily activities and has returned to all adls. Patient states that she wants to make today her last day of therapy. Objective: See treatment diary below      Assessment: Tolerated treatment well. Patient displaying WFL knee strength and stability. Patient wants to make today her last day of therapy and does report a return to all of her daily activities without issue. Patient displays good understanding of her exercise program and was provided with an updated handout. Patient was educated on the importance of continuing to adhere to home exercises. Patient verbalizes understanding. Plan: DC to HEP.       Precautions:   Past Medical History:   Diagnosis Date    Actinic keratosis     last assessed - [id number]    Arthritis     Atrial fibrillation with rapid ventricular response ([address])     last assessed - 2016    Basal cell carcinoma     Benign colon polyp     last assessed - 2015    CHF (congestive heart failure) ([address]) 2022    Disease of thyroid gland     Effusion of knee joint right     Resolved - [id number]    Esophageal reflux     Fibromyalgia     last assessed - 57Iyy2617    Fibromyalgia, primary     GERD (gastroesophageal reflux disease)     Hypertension     Palpitations     last assessed - 2013    Peroneal tendonitis, right     last assessed - 2013    Right lumbar radiculopathy 03/17/2016    Thyroid cancer [facility])     Thyroid nodule     Trochanteric bursitis of left hip 03/09/2018     Manuals 10/17 10/24           Knee prom TE TE                                                  Neuro Re-Ed             Quad set             saq 1.5# 2x10 1.5# 2x10           laq             slr 1.5# 2x10 1.5# 2x10           SL abd 1.5# 2x10 1.5# 2x10           SLS 3x30" 3x30"           Tandem gait foam  2 cycles at table 5 cycles at table           Ther Ex             Heel slide  20x 5" with strap 20x 5" with strap           Passive Knee ext stretch             HSS 3x30" 3x30"           Mini squats              STS             Step stretch 10x10" 10x10"           Step up 20x 8" step fwd/lat 20x 8" step fwd/lat           Seated knee flexion 10x10" 10x10"           bike 5' 5'           Ther Activity             RE                           Gait Training                                       Modalities

## 2023-10-27 ENCOUNTER — TELEPHONE (OUTPATIENT)
Dept: HEMATOLOGY ONCOLOGY | Facility: CLINIC | Age: 78
End: 2023-10-27

## 2023-10-27 ENCOUNTER — APPOINTMENT (OUTPATIENT)
Dept: PHYSICAL THERAPY | Facility: MEDICAL CENTER | Age: 78
End: 2023-10-27
Payer: MEDICARE

## 2023-10-27 NOTE — TELEPHONE ENCOUNTER
I called [facility] regarding an appointment that they have scheduled with Aurora Roche PA-C scheduled on  02/09/2024      I left a voicemail explaining to patient that this appointment will need to be rescheduled due to a change in the providers schedule. Patient was advised to call [facility] to reschedule.   Another attempt will be made to reschedule

## 2023-10-30 ENCOUNTER — OFFICE VISIT (OUTPATIENT)
Dept: GYNECOLOGY | Facility: CLINIC | Age: 78
End: 2023-10-30
Payer: MEDICARE

## 2023-10-30 VITALS
SYSTOLIC BLOOD PRESSURE: 120 MMHG | BODY MASS INDEX: 30.66 KG/M2 | HEIGHT: 61 IN | WEIGHT: 162.4 LBS | DIASTOLIC BLOOD PRESSURE: 90 MMHG

## 2023-10-30 DIAGNOSIS — N89.8 VAGINAL ODOR: ICD-10-CM

## 2023-10-30 DIAGNOSIS — R32 URINARY INCONTINENCE, UNSPECIFIED TYPE: Primary | ICD-10-CM

## 2023-10-30 DIAGNOSIS — N89.8 VAGINAL DISCHARGE: ICD-10-CM

## 2023-10-30 LAB
SL AMB  POCT GLUCOSE, UA: NORMAL
SL AMB LEUKOCYTE ESTERASE,UA: NORMAL
SL AMB POCT BILIRUBIN,UA: NORMAL
SL AMB POCT BLOOD,UA: NORMAL
SL AMB POCT CLARITY,UA: CLEAR
SL AMB POCT COLOR,UA: YELLOW
SL AMB POCT KETONES,UA: NORMAL
SL AMB POCT NITRITE,UA: NORMAL
SL AMB POCT PH,UA: NORMAL
SL AMB POCT SPECIFIC GRAVITY,UA: NORMAL
SL AMB POCT URINE PROTEIN: NORMAL
SL AMB POCT UROBILINOGEN: NORMAL

## 2023-10-30 PROCEDURE — 99213 OFFICE O/P EST LOW 20 MIN: CPT | Performed by: PHYSICIAN ASSISTANT

## 2023-10-30 PROCEDURE — 81002 URINALYSIS NONAUTO W/O SCOPE: CPT | Performed by: PHYSICIAN ASSISTANT

## 2023-10-31 ENCOUNTER — HOSPITAL ENCOUNTER (OUTPATIENT)
Dept: RADIOLOGY | Facility: HOSPITAL | Age: 78
Discharge: HOME | End: 2023-10-31
Payer: MEDICARE

## 2023-10-31 DIAGNOSIS — D32.9 MENINGIOMA (HCC): ICD-10-CM

## 2023-10-31 PROCEDURE — 70553 MRI BRAIN STEM W/O & W/DYE: CPT

## 2023-10-31 PROCEDURE — A9585 GADOBUTROL INJECTION: HCPCS | Performed by: RADIOLOGY

## 2023-10-31 PROCEDURE — G1004 CDSM NDSC: HCPCS

## 2023-10-31 RX ORDER — GADOBUTROL 604.72 MG/ML
7 INJECTION INTRAVENOUS
Status: COMPLETED | OUTPATIENT
Start: 2023-10-31 | End: 2023-10-31

## 2023-10-31 RX ADMIN — GADOBUTROL 7 ML: 604.72 INJECTION INTRAVENOUS at 11:30

## 2023-10-31 NOTE — NURSING NOTE
Device interrogation for MRI. Normal device function prior to MRI. Leads and device meet all requirements per policy for MRI. Device programmed AOO 85bpm per Cardiology for MRI. Patient has no complaints. Vital signs monitored throughout by ABHIJEET Mccoy RN. Normal device function post MRI. Device reprogrammed to prior settings per Cardiology.

## 2023-10-31 NOTE — NURSING NOTE
[place] device interrogation for MRI done by SPRING Arreaga clinical specialist. Device set to MRI safe mode @85 bpm  Pt hearing aids and dentures locked up. MRI brain w/without contrast complete. Pt tolerated well. VSS throughout scan. Pt alert and oriented on room air. No complaints or visible signs of distress. Device reprogrammed to prior settings per Cardiology by Blas Krishnamurthy RN.

## 2023-11-01 DIAGNOSIS — I48.0 PAROXYSMAL ATRIAL FIBRILLATION (HCC): ICD-10-CM

## 2023-11-01 LAB
A VAGINAE DNA VAG NAA+PROBE-LOG#: <3.25
A VAGINAE DNA VAG QL NAA+PROBE: NOT DETECTED
BVAB2 DNA VAG QL NAA+PROBE: NOT DETECTED
G VAGINALIS DNA SPEC QL NAA+PROBE: NOT DETECTED
G VAGINALIS DNA VAG NAA+PROBE-LOG#: <3.25
LACTOBACILLUS DNA VAG NAA+PROBE-LOG#: <3.25
LACTOBACILLUS DNA VAG NAA+PROBE-LOG#: NOT DETECTED
MEGA1 DNA VAG QL NAA+PROBE: NOT DETECTED
SL AMB C.ALBICANS BY PCR: NOT DETECTED
SL AMB CANDIDA GENUS: NOT DETECTED
T VAGINALIS RRNA SPEC QL NAA+PROBE: NOT DETECTED

## 2023-11-01 NOTE — PROGRESS NOTES
Assessment/Plan:    No problem-specific Assessment & Plan notes found for this encounter. Diagnoses and all orders for this visit:    Urinary incontinence, unspecified type  -     POCT urine dip  -     GP Vaginitis/Vaginosis W/O PAP W/O HPV  Expanded    Vaginal discharge  -     POCT urine dip  -     GP Vaginitis/Vaginosis W/O PAP W/O HPV  Expanded    Vaginal odor  -     POCT urine dip  -     GP Vaginitis/Vaginosis W/O PAP W/O HPV  Expanded          Subjective:      Patient ID: Chato Macario is a 77 y.o. female. Pt presents for a problem today  She has been uncomfrotable  Dsicomfort with urination   Some leakage  No pelvic pain  No pain  No fever  Some brownish discharge  Pt had a hyster    Ua neg  Cultures done        The following portions of the patient's history were reviewed and updated as appropriate: allergies, current medications, past family history, past medical history, past social history, past surgical history, and problem list.    Review of Systems   Constitutional:  Negative for chills, fever and unexpected weight change. HENT:  Negative for ear pain and sore throat. Eyes:  Negative for pain and visual disturbance. Respiratory:  Negative for cough and shortness of breath. Cardiovascular:  Negative for chest pain and palpitations. Gastrointestinal:  Negative for abdominal pain, blood in stool, constipation, diarrhea and vomiting. Genitourinary:  Positive for vaginal discharge. Negative for dysuria, hematuria and vaginal pain. Musculoskeletal:  Negative for arthralgias and back pain. Skin:  Negative for color change and rash. Neurological:  Negative for seizures and syncope. All other systems reviewed and are negative. Objective:      /90   Ht 5' 1" (1.549 m)   Wt 73.7 kg (162 lb 6.4 oz)   LMP 02/01/1990 (Within Weeks)   BMI 30.69 kg/m²          Physical Exam  Vitals and nursing note reviewed. Constitutional:       Appearance: She is well-developed. Genitourinary:     Exam position: Supine. Labia:         Right: No rash or lesion. Left: No rash or lesion. Vagina: Normal.      Cervix: No cervical motion tenderness, discharge or friability. Lymphadenopathy:      Lower Body: No right inguinal adenopathy. No left inguinal adenopathy.

## 2023-11-06 ENCOUNTER — TELEPHONE (OUTPATIENT)
Dept: CARDIOLOGY CLINIC | Facility: CLINIC | Age: 78
End: 2023-11-06

## 2023-11-06 NOTE — TELEPHONE ENCOUNTER
Hi, this is Mey Lopez. I do have an appointment to see Doctor Carmine Smith on the 30th of November at [facility].     I'm having a little issue. I've been like the last week when I I guess exert myself or move around and do something. It's like a little flutter in my chest and I get tired. If we could check the monitor in my bedroom, if you could call me back. My birthday's [birth year] and my phone is [phone]. Thanks.  Swathi

## 2023-11-07 ENCOUNTER — TELEPHONE (OUTPATIENT)
Dept: HEMATOLOGY ONCOLOGY | Facility: CLINIC | Age: 78
End: 2023-11-07

## 2023-11-07 NOTE — TELEPHONE ENCOUNTER
Appointment Change  Cancel, Reschedule, Change to Virtual      Who are you speaking with? Patient   If it is not the patient, is the caller listed on the communication consent form? N/A   Which provider is the appointment scheduled with? Lee Camacho PA-C   When was the original appointment scheduled? Please list date and time 2/9/24 @ 10   At which location is the appointment scheduled to take place[facility]   Was the appointment rescheduled? Was the appointment changed from an in person visit to a virtual visit? If so, please list the details of the change. 2/1/24 @ 1030   What is the reason for the appointment change? provider       Was [facility] transport scheduled? No   Does [facility] transport need to be scheduled for the new visit (if applicable) No   Does the patient need an infusion appointment rescheduled? No   Does the patient have an upcoming infusion appointment scheduled? If so, when? No   Is the patient undergoing chemotherapy? No   For appointments cancelled with less than 24 hours:  Was the no-show policy reviewed?  N/A

## 2023-11-10 ENCOUNTER — TELEPHONE (OUTPATIENT)
Dept: OTHER | Facility: HOSPITAL | Age: 78
End: 2023-11-10

## 2023-11-10 DIAGNOSIS — G25.81 RLS (RESTLESS LEGS SYNDROME): ICD-10-CM

## 2023-11-10 RX ORDER — ROPINIROLE 1 MG/1
2 TABLET, FILM COATED ORAL
Qty: 180 TABLET | Refills: 1 | Status: SHIPPED | OUTPATIENT
Start: 2023-11-10 | End: 2024-02-08

## 2023-11-10 NOTE — TELEPHONE ENCOUNTER
Patient called medication refill line in regards to her script for ropinirole 1mg tablet. She was told recently to incase to taking two tablets a day and she needs a new script stating that sent to the pharmacy. The current script they have states one tablet daily and she is now out and the script is to soon due to incorrect dosing. Please review and advise.

## 2023-11-13 ENCOUNTER — OFFICE VISIT (OUTPATIENT)
Dept: NEUROLOGY | Facility: CLINIC | Age: 78
End: 2023-11-13
Payer: MEDICARE

## 2023-11-13 VITALS
HEIGHT: 61 IN | HEART RATE: 96 BPM | TEMPERATURE: 97.7 F | OXYGEN SATURATION: 97 % | WEIGHT: 158.2 LBS | SYSTOLIC BLOOD PRESSURE: 152 MMHG | DIASTOLIC BLOOD PRESSURE: 88 MMHG | BODY MASS INDEX: 29.87 KG/M2

## 2023-11-13 DIAGNOSIS — D32.9 MENINGIOMA (HCC): ICD-10-CM

## 2023-11-13 DIAGNOSIS — D47.2 MGUS (MONOCLONAL GAMMOPATHY OF UNKNOWN SIGNIFICANCE): ICD-10-CM

## 2023-11-13 DIAGNOSIS — R25.1 TREMORS OF NERVOUS SYSTEM: Primary | ICD-10-CM

## 2023-11-13 DIAGNOSIS — G62.9 PERIPHERAL POLYNEUROPATHY: ICD-10-CM

## 2023-11-13 PROCEDURE — 99214 OFFICE O/P EST MOD 30 MIN: CPT | Performed by: NURSE PRACTITIONER

## 2023-11-13 NOTE — ASSESSMENT & PLAN NOTE
Patient remains stable in regards to her neuropathy. Exam remains relatively stable from 1 year ago. She does continue with heat like sensation around her abdomen and thoracic region, MRI thoracic spine was unrevealing other than some mild spondylosis and some postural kyphosis on exam which may be contributing. Work-up for her neuropathy was showed suboptimal B12 in which she was on supplementation, more recently diagnosed with diabetes that she understands the importance of good blood sugar control. 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 She is currently on duloxetine 30 mg daily and gabapentin 300 mg twice daily which is managing her neuropathic pain. No changes made today as her condition is stable, she was encouraged to stay active, recent completed PT.

## 2023-11-13 NOTE — ASSESSMENT & PLAN NOTE
Patient has noted worsening of tremor since last visit. Tremors continued to be present with certain tasks, no resting tremor or parkinsonian features on exam. Likely essential tremor as she does have family hx of tremor. We did again review medication options:  -Switching metoprolol to propanolol, would need to discuss with cardiology  -Low doses of primidone, would need to be cautious with potential interaction with Eliquis  -Topiramate, would need to watch for side effects of drowsiness, numbness and tingling, brain fog  -Increasing her current dose of gabapentin    During her visit today she did not wish to make any changes. She plans to discuss with her cardiologist and PCP. She will contact me if she wants to start one of the above options. Plan for follow-up in 6 months. To contact the office sooner with any concerns or worsening symptoms.

## 2023-11-13 NOTE — ASSESSMENT & PLAN NOTE
Recent MRI brain showed stable meningioma, she has plans to follow-up with neurosurgery to discuss in the near future. She denies any further headaches. She does have diplopia which is managed with prisms, does follow with ophthalmology regularly.

## 2023-11-13 NOTE — PATIENT INSTRUCTIONS
Medication options:  Propanolol- will have to check with cardiology    Primidone-possible side effects of drowsiness, recommend only low doses due to potential interaction with Eliquis    Topiramate-potential side effects of drowsiness, numbness and tingling, brain fog    Gabapentin-would increase dose you are taking to 300 mg 3 times a day, if ineffective could try 400 mg 3 times a day, if no improvement would taper back to prior dosing and try one of the above options

## 2023-11-13 NOTE — PROGRESS NOTES
Patient ID: Chaim Baker is a 77 y.o. female. Assessment/Plan:    Peripheral neuropathy  Patient remains stable in regards to her neuropathy. Exam remains relatively stable from 1 year ago. She does continue with heat like sensation around her abdomen and thoracic region, MRI thoracic spine was unrevealing other than some mild spondylosis and some postural kyphosis on exam which may be contributing. Work-up for her neuropathy was showed suboptimal B12 in which she was on supplementation, more recently diagnosed with diabetes that she understands the importance of good blood sugar control. 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 She is currently on duloxetine 30 mg daily and gabapentin 300 mg twice daily which is managing her neuropathic pain. No changes made today as her condition is stable, she was encouraged to stay active, recent completed PT. Meningioma [facility])  Recent MRI brain showed stable meningioma, she has plans to follow-up with neurosurgery to discuss in the near future. She denies any further headaches. She does have diplopia which is managed with prisms, does follow with ophthalmology regularly. Tremors of nervous system  Patient has noted worsening of tremor since last visit. Tremors continued to be present with certain tasks, no resting tremor or parkinsonian features on exam. Likely essential tremor as she does have family hx of tremor. We did again review medication options:  -Switching metoprolol to propanolol, would need to discuss with cardiology  -Low doses of primidone, would need to be cautious with potential interaction with Eliquis  -Topiramate, would need to watch for side effects of drowsiness, numbness and tingling, brain fog  -Increasing her current dose of gabapentin    During her visit today she did not wish to make any changes. She plans to discuss with her cardiologist and PCP.   She will contact me if she wants to start one of the above options. Plan for follow-up in 6 months. To contact the office sooner with any concerns or worsening symptoms. Diagnoses and all orders for this visit:    Tremors of nervous system    Peripheral polyneuropathy    MGUS (monoclonal gammopathy of unknown significance)    Meningioma (HCC)           Subjective:    HPI    Patient is a 21-year-old woman who was referred for evaluation for thoracic pain, as well as for tremors. To review patient  was initially seen by Dr. Stephanie Maria in April 2021 for initial consult-reported she has had a warm sensation around her abdomen, which started in 2020. Sensation was described as a heat like a band that started from thoracic region, would radiate to both sides of the abdomen along the rib cage, and came all the way in the front. Symptoms are bilateral and intermitteny     She also complained tremors in both hands. Does have family history of essential tremors in brother who is s/p DBS placement. Noted 1 year history of diplopia without ptosis in which she had been evaluated by ophthalmology and was given prisms. She does have a history of fibromyalgia and low back pain in which she is following with pain management and is on gabapentin. Last office visit 7/2022 in which her symptoms were stable. Interval History:   She feels neuropathy is stable, continues with numbness and tingling in the feet. She continues with head sensation around the ribs, is positional, mainly when she sits. She has "gotten used to it"   She denies any symptoms in the UE. She feels more imbalance over the past year. No recent falls. She completed PT for her knee replacement that she had performed in July 2023. No new bowel or bladder symptoms. She remains duloxetine 30 mg at bedtime, gabapentin 300 mg BID. She feels this controls pain. No  muscle weakness.    She continues to follow with hematology for MGUS, last seen in August 2022 in which labs were stable. Has follow up in a few months. She feels tremor has worsened since her last visit. She continues to feel tremor with action. Can be difficulty to carry a plate or a tray, or any item. Then tends to occur when her hands are side ways  She has no difficulty eating or cutting up her foot, no difficulty with drinking, slight tremor when holding a mug. Handwriting has gotten more sloppy, She can write out a check and sign her name. Handwriting can be hard to read at times. She can have some difficulty with buttons and clasps of jewelry/earrings. No other difficulty with ADLs. She does have a family hx of tremor in her brothers. No head or vocal tremor. Since last visit , follows with neurosurgery at [facility]     Also hospitalized for A-fib and August 2023 does follow with cardiology on [facility]. ,   She also had a knee replacement in July 2023. Work up Completed:   labs: 7/2023 lipid panel normal, a1c 6.6    She did have repeat labs for MGUS- spep with monoclonal peak in the gamma region, immunofixation with elevated IgKappa   beta 2 2.4   IgE, IGG, IGM normal  [id number]  Blood work done in the hospital included normal TSH.  CBC, CMP were normal.   Copper, Ceruloplasmin, acetylcholine receptor antibodies, B6-normal  B12 399, MMA elevated at 44  Serum immunofixation: monoclonal gammopathy identified as IgG lambda (0.26 g/dL)-MGUS     MRI of the thoracic spine performed in September 2020: mild spondylosis and osteoarthritis, without any evidence for cord compression, or any neuroforaminal compression. MRI brain 10/2023: Stable meningioma within the left anterior lateral anterior cranial fossa. Stable advanced chronic microangiopathic change.     The following portions of the patient's history were reviewed and updated as appropriate: allergies, current medications, past family history, past medical history, past social history, past surgical history and problem list.          Objective:    Blood pressure 152/88, pulse 96, temperature 97.7 °F (36.5 °C), temperature source Temporal, height 5' 1" (1.549 m), weight 71.8 kg (158 lb 3.2 oz), last menstrual period 02/01/1990, SpO2 97 %, not currently breastfeeding. Physical Exam  Constitutional:       General: She is awake. HENT:      Right Ear: Hearing normal.      Left Ear: Hearing normal.   Eyes:      General: Lids are normal.      Pupils: Pupils are equal, round, and reactive to light. Neurological:      Mental Status: She is alert. Deep Tendon Reflexes:      Reflex Scores:       Bicep reflexes are 2+ on the right side. Brachioradialis reflexes are 2+ on the right side and 2+ on the left side. Patellar reflexes are 2+ on the right side. Psychiatric:         Speech: Speech normal.         Neurological Exam  Mental Status  Awake and alert. Oriented to person, place, time and situation. Speech is normal. Language is fluent with no aphasia. Cranial Nerves  CN III, IV, VI: Normal lids and orbits bilaterally. Pupils equal round and reactive to light bilaterally. No diplopia, EOM intact during exat, however intermittent esotropia noted in the right eye during primary gaze at times. CN V:  Right: Facial sensation is normal.  Left: Facial sensation is normal on the left. CN VII:  Right: There is no facial weakness. Left: There is no facial weakness. CN VIII:  Right: Hearing is normal.  Left: Hearing is normal.  CN IX, X: Palate elevates symmetrically  CN XI:  Right: Trapezius strength is normal.  Left: Trapezius strength is normal.  CN XII: Tongue midline without atrophy or fasciculations. Motor  Normal muscle bulk throughout.                                                Right                     Left  Elbow flexion                         5                          5  Elbow extension                    5                          5  Wrist flexion                           5                          5  Wrist extension 5                          5  Finger abduction                    5                          5  Hip flexion                              5                          5  Knee flexion                           5                          5  Knee extension                      5                          5  Plantarflexion                         5                          5  Dorsiflexion                            5                          5    Sensory  Light touch is normal in upper and lower extremities. Pinprick abnormality: Normal in bilateral UE, diminished to ankles in b/l LE  . Temperature abnormality: Normal in bilateral UE, diminished to the ankles in b/l LE. Vibration abnormality: Normal in  UE, reduced at b/l great toe (5-6 secs)    . Proprioception is normal in upper and lower extremities. Reflexes                                            Right                      Left  Brachioradialis                    2+                         2+  Biceps                                 2+                          Patellar                                2+                         Tr  Achilles                                Tr                         Tr  Right Plantar: downgoing  Left Plantar: downgoing    Coordination    No resting or postural tremor, Finger to nose normal except mild tremor noted when approaching nose bilaterally. No bradykinesia, rigidity, cogwheeling. .    Gait  Casual gait is normal including stance, stride, and arm swing. Able to rise from chair without using arms. I have personally reviewed the ROS performed by the MA.    ROS:    Review of Systems   Constitutional:  Negative for appetite change, fatigue and fever. HENT: Negative. Negative for hearing loss, tinnitus, trouble swallowing and voice change. Eyes: Negative. Negative for photophobia, pain and visual disturbance. Respiratory: Negative. Negative for shortness of breath. Cardiovascular: Negative.   Negative for palpitations. Gastrointestinal: Negative. Negative for nausea and vomiting. Endocrine: Negative. Negative for cold intolerance. Genitourinary: Negative. Negative for dysuria, frequency and urgency. Musculoskeletal:  Positive for gait problem (balance issues- leans to side to side). Negative for back pain, myalgias and neck pain. Skin: Negative. Negative for rash. Allergic/Immunologic: Negative. Neurological:  Positive for tremors (hands - worse since last visit) and numbness (feet- same since last visit). Negative for dizziness, seizures, syncope, facial asymmetry, speech difficulty, weakness, light-headedness and headaches. Hematological: Negative. Does not bruise/bleed easily. Psychiatric/Behavioral: Negative. Negative for confusion, hallucinations and sleep disturbance. All other systems reviewed and are negative.

## 2023-11-30 ENCOUNTER — OFFICE VISIT (OUTPATIENT)
Dept: CARDIOLOGY CLINIC | Facility: CLINIC | Age: 78
End: 2023-11-30
Payer: MEDICARE

## 2023-11-30 DIAGNOSIS — I27.20 PULMONARY HYPERTENSION (HCC): ICD-10-CM

## 2023-11-30 DIAGNOSIS — E78.2 MIXED HYPERLIPIDEMIA: ICD-10-CM

## 2023-11-30 DIAGNOSIS — I36.1 NONRHEUMATIC TRICUSPID VALVE REGURGITATION: ICD-10-CM

## 2023-11-30 DIAGNOSIS — I50.32 CHRONIC DIASTOLIC CHF (CONGESTIVE HEART FAILURE) (HCC): ICD-10-CM

## 2023-11-30 DIAGNOSIS — I10 ESSENTIAL HYPERTENSION: ICD-10-CM

## 2023-11-30 DIAGNOSIS — Z95.0 PACEMAKER: ICD-10-CM

## 2023-11-30 DIAGNOSIS — E11.9 TYPE 2 DIABETES MELLITUS WITHOUT COMPLICATION, WITHOUT LONG-TERM CURRENT USE OF INSULIN (HCC): ICD-10-CM

## 2023-11-30 DIAGNOSIS — I48.91 ATRIAL FIBRILLATION WITH RVR (HCC): Primary | ICD-10-CM

## 2023-11-30 PROCEDURE — 99214 OFFICE O/P EST MOD 30 MIN: CPT | Performed by: INTERNAL MEDICINE

## 2023-11-30 RX ORDER — NICOTINE 14MG/24HR
1 PATCH, TRANSDERMAL 24 HOURS TRANSDERMAL DAILY
COMMUNITY

## 2023-11-30 RX ORDER — DILTIAZEM HYDROCHLORIDE 120 MG/1
120 CAPSULE, COATED, EXTENDED RELEASE ORAL 2 TIMES DAILY
Qty: 180 CAPSULE | Refills: 3 | Status: SHIPPED | OUTPATIENT
Start: 2023-11-30

## 2023-12-01 VITALS
SYSTOLIC BLOOD PRESSURE: 154 MMHG | WEIGHT: 158 LBS | OXYGEN SATURATION: 96 % | DIASTOLIC BLOOD PRESSURE: 82 MMHG | HEIGHT: 62 IN | BODY MASS INDEX: 29.08 KG/M2 | HEART RATE: 62 BPM

## 2023-12-01 PROCEDURE — 93000 ELECTROCARDIOGRAM COMPLETE: CPT | Performed by: INTERNAL MEDICINE

## 2023-12-01 NOTE — PROGRESS NOTES
Cardiology Follow Up    Mandie Morris  1945  [id number]  [address]    1. Atrial fibrillation with [facility])  Ambulatory referral to Cardiology    POCT ECG      2. Pacemaker  POCT ECG    diltiazem (CARDIZEM CD) 120 mg 24 hr capsule      3. Type 2 diabetes mellitus without complication, without long-term current use of insulin ([address])        4. Chronic diastolic CHF (congestive heart failure) ([address])        5. Essential hypertension        6. Nonrheumatic tricuspid valve regurgitation        7. Pulmonary hypertension ([address])        8. Mixed hyperlipidemia            Discussion/Summary: She had a recent postop episode of atrial fibrillation requiring cardioversion. She has had 2 A-fib ablations and remains on antiarrhythmic therapy with flecainide. At this point I would continue her current treatment plan. I do not think we need to alter any of the therapy. This episode of A-fib was likely induced by the stress of surgery. She continues to have recurrence of atrial fibrillation would talk to EP about AV node ablation she already has a pacer. Blood pressure came down with manual recheck. I did however increase her Cardizem to twice daily  lipids have been stable. She has moderate tricuspid regurgitation continue to follow on echoes yearly. Interval History:  66 yo pleasant female with  paroxysmal atrial fibrillation, hypertension, hyperlipidemia, permanent pacer presents for a follow-up visit. Since last visit she had knee surgery following surgery she had an episode of postop A-fib requiring SHANNAN cardioversion. Since that time she has been doing well functional capacity has improved. Blood pressure has been a little labile. Denies any anginal sounding chest pain or discomfort there is been no lower extremity edema, PND, orthopnea. There is been no syncope. She is tolerating anticoagulation well.   Problem List Generalized edema    Essential hypertension    Dyslipidemia    Sacroiliitis (HCC)    Acute pain of right knee    Allergic rhinitis    Fibromyalgia    Hyperlipidemia    Insomnia    Lumbar spondylosis    Lumbar stenosis    Osteoarthrosis, hand    Osteoarthritis of knee    Overview Signed 10/4/2018  5:41 PM by Manjinder Coronel MD     Laterality: Right         Osteopenia    Paroxysmal atrial fibrillation (HCC)    Peripheral neuropathy    Restless legs syndrome    Right lumbar radiculopathy    TMJ syndrome    Vitamin D deficiency    Effusion of right knee          Past Medical History:   Diagnosis Date    Actinic keratosis     last assessed - [id number]    Arthritis     Atrial fibrillation with rapid ventricular response ([address])     last assessed - [id number]    Basal cell carcinoma     Benign colon polyp     last assessed - [id number]    CHF (congestive heart failure) ([address]) 06/2022    Disease of thyroid gland     Effusion of knee joint right     Resolved - [id number]    Esophageal reflux     Fibromyalgia     last assessed - [id number]    Fibromyalgia, primary     GERD (gastroesophageal reflux disease)     Hypertension     Palpitations     last assessed - [id number]    Peroneal tendonitis, right     last assessed - [id number]    Right lumbar radiculopathy 03/17/2016    Thyroid cancer ([address])     Thyroid nodule     Trochanteric bursitis of left hip 03/09/2018     Social History     Socioeconomic History    Marital status:       Spouse name: Not on file    Number of children: Not on file    Years of education: Not on file    Highest education level: Not on file   Occupational History    Not on file   Tobacco Use    Smoking status: Never    Smokeless tobacco: Never   Vaping Use    Vaping Use: Never used   Substance and Sexual Activity    Alcohol use: Not Currently    Drug use: Never    Sexual activity: Not Currently   Other Topics Concern    Not on file   Social History Narrative    Not on file     Social Determinants of Health     Financial Resource Strain: Low Risk  (9/19/2022)    Overall Financial Resource Strain (CARDIA)     Difficulty of Paying Living Expenses: Not hard at all   Food Insecurity: Not on file   Transportation Needs: No Transportation Needs (9/19/2022)    PRAPARE - Transportation     Lack of Transportation (Medical): No     Lack of Transportation (Non-Medical):  No   Physical Activity: Not on file   Stress: Not on file   Social Connections: Not on file   Intimate Partner Violence: Not on file   Housing Stability: Not on file      Family History   Problem Relation Age of Onset    Heart disease Mother     Diabetes Mother     Heart disease Father     Coronary artery disease Father     Stroke Father         cerebrovascular accident    Heart attack Father         myocardial infarction    Sudden death Father         scd    Other Family         Back disorder    Coronary artery disease Family     Neuropathy Family     Osteoporosis Family     No Known Problems Daughter     No Known Problems Maternal Grandmother     No Known Problems Maternal Grandfather     No Known Problems Paternal Grandmother     No Known Problems Paternal Grandfather     Cancer Maternal Uncle     Breast cancer Maternal Aunt 72    No Known Problems Son     No Known Problems Maternal Aunt     No Known Problems Maternal Aunt     No Known Problems Maternal Aunt     No Known Problems Paternal Aunt     No Known Problems Paternal Aunt     Anuerysm Neg Hx     Clotting disorder Neg Hx     Arrhythmia Neg Hx     Heart failure Neg Hx      Past Surgical History:   Procedure Laterality Date    CARDIAC ELECTROPHYSIOLOGY STUDY AND ABLATION  08/2022    CATARACT EXTRACTION Bilateral     COLONOSCOPY  03/2018    COLONOSCOPY W/ POLYPECTOMY  2021    repeat in 5 years    EYE SURGERY      HYSTERECTOMY      JOINT REPLACEMENT Left     knee    LA NEUROPLASTY &/TRANSPOS MEDIAN NRV CARPAL TUNNE Right 11/14/2019    Procedure: RELEASE CARPAL TUNNEL;  Surgeon: Francisco J Sagastume MD; Location: [facility];  Service: Orthopedics    TX TOTAL THYROID LOBECTOMY UNI W/WO ISTHMUSECTOMY Left 12/16/2020    Procedure: Left THYROID LOBECTOMY;  Surgeon: Kirk Brown MD;  Location: [facility];  Service: ENT    RECTAL POLYPECTOMY      REPLACEMENT TOTAL KNEE Left     last assessed - [id number]    TONSILLECTOMY      TOTAL ABDOMINAL HYSTERECTOMY      TUBAL LIGATION      US GUIDED THYROID BIOPSY  10/14/2020       Current Outpatient Medications:     apixaban (ELIQUIS) 5 mg, Take 1 tablet (5 mg total) by mouth 2 (two) times a day, Disp: 42 tablet, Rfl: 0    ascorbic acid (VITAMIN C) 500 mg tablet, Take 500 mg by mouth daily , Disp: , Rfl:     Cetirizine HCl (ZyrTEC Allergy) 10 MG CAPS, Take 10 mg by mouth in the morning, Disp: , Rfl:     Cholecalciferol 50 MCG (2000 UT) CAPS, Take 2,000 Units by mouth 2 (two) times a day, Disp: , Rfl:     diltiazem (CARDIZEM CD) 120 mg 24 hr capsule, Take 1 capsule (120 mg total) by mouth 2 (two) times a day, Disp: 180 capsule, Rfl: 3    DULoxetine (CYMBALTA) 30 mg delayed release capsule, take 1 capsule by mouth once daily, Disp: 30 capsule, Rfl: 2    ezetimibe (ZETIA) 10 mg tablet, take 1 tablet by mouth once daily, Disp: 90 tablet, Rfl: 3    famotidine (PEPCID) 20 mg tablet, Take 20 mg by mouth every other day  , Disp: , Rfl:     flecainide (TAMBOCOR) 100 mg tablet, Take 1 tablet (100 mg total) by mouth 2 (two) times a day, Disp: 89877 tablet, Rfl: 3    furosemide (LASIX) 20 mg tablet, take 2 tablet by mouth daily if needed for shortness of breath WEIGHT GAIN 3 LBS IN 1 DAY OR LOWER LEG SWELLING, Disp: 20 tablet, Rfl: 3    gabapentin (NEURONTIN) 300 mg capsule, Take 1 capsule (300 mg total) by mouth daily at bedtime, Disp: 90 capsule, Rfl: 1    ipratropium (ATROVENT) 0.03 % nasal spray, 2 sprays into each nostril 3 (three) times a day Begin once per day, then progress to twice per day. Progress to three times a day as tolerated. , Disp: 90 mL, Rfl: 3    lidocaine (XYLOCAINE) 5 % ointment, Apply topically as needed for mild pain, Disp: 35.44 g, Rfl: 0    losartan (COZAAR) 50 mg tablet, Take 1 tablet (50 mg total) by mouth 2 (two) times a day, Disp: 180 tablet, Rfl: 3    metoprolol succinate (TOPROL-XL) 25 mg 24 hr tablet, TAKE 4 TABLETS BY MOUTH EVERY 12 HOURS, Disp: 240 tablet, Rfl: 1    rOPINIRole (REQUIP) 1 mg tablet, Take 2 tablets (2 mg total) by mouth daily at bedtime, Disp: 180 tablet, Rfl: 1    Saccharomyces boulardii (Probiotic) 250 MG CAPS, Take 1 capsule by mouth daily, Disp: , Rfl:     TURMERIC PO, Take 1 capsule by mouth 2 (two) times a day, Disp: , Rfl:     zolpidem (AMBIEN) 10 mg tablet, Take 1 tablet (10 mg total) by mouth daily at bedtime as needed for sleep, Disp: 90 tablet, Rfl: 1    Arthritis Pain Relief 650 MG CR tablet, Take 650 mg by mouth 3 (three) times a day (Patient not taking: Reported on 11/30/2023), Disp: , Rfl:     Chromium Picolinate (CHROMIUM PICOLATE PO), Take 1 tablet by mouth daily (Patient not taking: Reported on 11/30/2023), Disp: , Rfl:   Allergies   Allergen Reactions    Sotalol Other (See Comments)     Prolonged QTc leading to torsades de pointes     Penicillins Other (See Comments)     As a child calcium deposit in the arm     Ace Inhibitors GI Intolerance     Did feel well on it    Omeprazole Abdominal Pain, Rash and Vomiting     stomach pain, vomiting, rash       Labs:     Chemistry        Component Value Date/Time     05/06/2015 1116    K 4.4 09/18/2023 0819    K 4.8 05/06/2015 1116    CL 97 09/18/2023 0819    CL 99 (L) 05/06/2015 1116    CO2 28 09/18/2023 0819    CO2 28 05/06/2015 1116    BUN 20 09/18/2023 0819    BUN 19 05/06/2015 1116    CREATININE 0.78 09/18/2023 0819    CREATININE 0.97 05/06/2015 1116        Component Value Date/Time    CALCIUM 9.7 09/18/2023 0819    CALCIUM 9.0 05/06/2015 1116    ALKPHOS 128 (H) 08/28/2023 0829    ALKPHOS 60 05/06/2015 1116    AST 15 08/28/2023 0829    AST 28 05/06/2015 1116    ALT 16 08/28/2023 0829 ALT 34 05/06/2015 1116    BILITOT 0.78 05/06/2015 1116            Lab Results   Component Value Date    CHOL 205 05/06/2015    CHOL 195 07/10/2014     Lab Results   Component Value Date    HDL 53 07/03/2023    HDL 66 04/01/2021    HDL 54 08/03/2020     Lab Results   Component Value Date    LDLCALC 96 07/03/2023    LDLCALC 88 04/01/2021    LDLCALC 104 (H) 08/03/2020     Lab Results   Component Value Date    TRIG 68 07/03/2023    TRIG 112 04/01/2021    TRIG 121 08/03/2020     No results found for: "CHOLHDL"    Imaging: No results found. ECG:    Sinus bradycardia nonspecific T-wave changes     Review of Systems   Constitutional: Negative. HENT: Negative. Eyes: Negative. Cardiovascular: Negative. Negative for leg swelling. Respiratory: Negative. Endocrine: Negative. Hematologic/Lymphatic: Negative. Skin: Negative. Musculoskeletal: Negative. Gastrointestinal: Negative. Genitourinary: Negative. Neurological: Negative. Psychiatric/Behavioral: Negative. Vitals:    11/30/23 1621   BP: 154/82   Pulse: 62   SpO2: 96%     Vitals:    11/30/23 1621   Weight: 71.7 kg (158 lb)     Height: 5' 2" (157.5 cm)   Body mass index is 28.9 kg/m². Physical Exam:  Vital signs reviewed  General:  Alert and cooperative, appears stated age, no acute distress  HEENT:  PERRLA, EOMI, no scleral icterus, no conjunctival pallor  Neck:  No lymphadenopathy, no thyromegaly, no carotid bruits, no elevated JVP  Heart:  Regular rate and rhythm, normal S1/S2, no S3/S4, no murmur, rubs or gallops. PMI nondisplaced  Lungs:  Clear to auscultation bilaterally, no wheezes rales or rhonchi  Abdomen:  Soft, non-tender, positive bowel sounds, no rebound or guarding,   no organomegaly   Extremities:  Normal range of motion.   No clubbing, cyanosis or edema   Vascular:  2+ pedal pulses  Skin:  No rashes or lesions on exposed skin  Neurologic:  Cranial nerves II-XII grossly intact without focal deficits  Psych: Normal mood and affect

## 2023-12-05 ENCOUNTER — HOSPITAL ENCOUNTER (OUTPATIENT)
Dept: NON INVASIVE DIAGNOSTICS | Facility: CLINIC | Age: 78
Discharge: HOME | End: 2023-12-05
Payer: MEDICARE

## 2023-12-05 VITALS
DIASTOLIC BLOOD PRESSURE: 82 MMHG | HEART RATE: 70 BPM | WEIGHT: 158 LBS | SYSTOLIC BLOOD PRESSURE: 154 MMHG | HEIGHT: 62 IN | BODY MASS INDEX: 29.08 KG/M2

## 2023-12-05 DIAGNOSIS — I48.0 PAROXYSMAL ATRIAL FIBRILLATION (HCC): ICD-10-CM

## 2023-12-05 DIAGNOSIS — I27.20 PULMONARY HTN (HCC): ICD-10-CM

## 2023-12-05 LAB
AORTIC ROOT: 1.9 CM
APICAL FOUR CHAMBER EJECTION FRACTION: 61 %
E WAVE DECELERATION TIME: 150 MS
E/A RATIO: 2.33
FRACTIONAL SHORTENING: 41 (ref 28–44)
INTERVENTRICULAR SEPTUM IN DIASTOLE (PARASTERNAL SHORT AXIS VIEW): 0.8 CM
INTERVENTRICULAR SEPTUM: 0.8 CM (ref 0.6–1.1)
LAAS-AP2: 14.8 CM2
LAAS-AP4: 25.7 CM2
LEFT ATRIUM AREA SYSTOLE SINGLE PLANE A4C: 23 CM2
LEFT ATRIUM SIZE: 4.4 CM
LEFT ATRIUM VOLUME (MOD BIPLANE): 63 ML
LEFT ATRIUM VOLUME INDEX (MOD BIPLANE): 36.4 ML/M2
LEFT INTERNAL DIMENSION IN SYSTOLE: 3.4 CM (ref 2.1–4)
LEFT VENTRICULAR INTERNAL DIMENSION IN DIASTOLE: 5.8 CM (ref 3.5–6)
LEFT VENTRICULAR POSTERIOR WALL IN END DIASTOLE: 0.7 CM
LEFT VENTRICULAR STROKE VOLUME: 123 ML
LVSV (TEICH): 123 ML
MV E'TISSUE VEL-SEP: 8 CM/S
MV PEAK A VEL: 0.42 M/S
MV PEAK E VEL: 98 CM/S
MV STENOSIS PRESSURE HALF TIME: 43 MS
MV VALVE AREA P 1/2 METHOD: 5.12
RA PRESSURE ESTIMATED: 8 MMHG
RIGHT ATRIUM AREA SYSTOLE A4C: 14.2 CM2
RIGHT VENTRICLE ID DIMENSION: 3.7 CM
RV PSP: 53 MMHG
SL CV LEFT ATRIUM LENGTH A2C: 4.8 CM
SL CV LV EF: 55
SL CV PED ECHO LEFT VENTRICLE DIASTOLIC VOLUME (MOD BIPLANE) 2D: 169 ML
SL CV PED ECHO LEFT VENTRICLE SYSTOLIC VOLUME (MOD BIPLANE) 2D: 47 ML
TR MAX PG: 45 MMHG
TR PEAK VELOCITY: 3.3 M/S
TRICUSPID ANNULAR PLANE SYSTOLIC EXCURSION: 2 CM
TRICUSPID VALVE PEAK REGURGITATION VELOCITY: 3.34 M/S

## 2023-12-05 PROCEDURE — 93308 TTE F-UP OR LMTD: CPT

## 2023-12-05 PROCEDURE — 93321 DOPPLER ECHO F-UP/LMTD STD: CPT | Performed by: INTERNAL MEDICINE

## 2023-12-05 PROCEDURE — 93325 DOPPLER ECHO COLOR FLOW MAPG: CPT | Performed by: INTERNAL MEDICINE

## 2023-12-05 PROCEDURE — 93321 DOPPLER ECHO F-UP/LMTD STD: CPT

## 2023-12-05 PROCEDURE — 93308 TTE F-UP OR LMTD: CPT | Performed by: INTERNAL MEDICINE

## 2023-12-05 PROCEDURE — 93325 DOPPLER ECHO COLOR FLOW MAPG: CPT

## 2023-12-13 NOTE — TELEPHONE ENCOUNTER
Called patient and daughter Alton Guardado  EF is down to 45% from routine echo today likely from [facility], see my last note on device check HR's  Will increase her toprol XL to 125mg BID  100MG tablet toprol sent to [facility]  Will discuss EF findings with Dr Denise Boone 
none

## 2023-12-15 DIAGNOSIS — I50.32 CHRONIC DIASTOLIC HEART FAILURE (HCC): ICD-10-CM

## 2023-12-15 DIAGNOSIS — I10 ESSENTIAL HYPERTENSION: ICD-10-CM

## 2023-12-15 DIAGNOSIS — I48.19 PERSISTENT ATRIAL FIBRILLATION (HCC): ICD-10-CM

## 2023-12-15 DIAGNOSIS — F33.0 MILD EPISODE OF RECURRENT MAJOR DEPRESSIVE DISORDER (HCC): ICD-10-CM

## 2023-12-15 RX ORDER — DULOXETIN HYDROCHLORIDE 30 MG/1
CAPSULE, DELAYED RELEASE ORAL
Qty: 30 CAPSULE | Refills: 2 | Status: SHIPPED | OUTPATIENT
Start: 2023-12-15

## 2023-12-15 RX ORDER — METOPROLOL SUCCINATE 25 MG/1
100 TABLET, EXTENDED RELEASE ORAL EVERY 12 HOURS
Qty: 240 TABLET | Refills: 1 | Status: SHIPPED | OUTPATIENT
Start: 2023-12-15

## 2023-12-19 ENCOUNTER — OFFICE VISIT (OUTPATIENT)
Dept: FAMILY MEDICINE CLINIC | Facility: CLINIC | Age: 78
End: 2023-12-19
Payer: MEDICARE

## 2023-12-19 VITALS
OXYGEN SATURATION: 98 % | DIASTOLIC BLOOD PRESSURE: 80 MMHG | HEART RATE: 65 BPM | SYSTOLIC BLOOD PRESSURE: 140 MMHG | BODY MASS INDEX: 29.44 KG/M2 | WEIGHT: 160 LBS | TEMPERATURE: 97.3 F | HEIGHT: 62 IN | RESPIRATION RATE: 18 BRPM

## 2023-12-19 DIAGNOSIS — D32.9 MENINGIOMA (HCC): ICD-10-CM

## 2023-12-19 DIAGNOSIS — I27.20 PULMONARY HYPERTENSION (HCC): ICD-10-CM

## 2023-12-19 DIAGNOSIS — I50.32 CHRONIC DIASTOLIC CHF (CONGESTIVE HEART FAILURE) (HCC): ICD-10-CM

## 2023-12-19 DIAGNOSIS — I10 ESSENTIAL HYPERTENSION: ICD-10-CM

## 2023-12-19 DIAGNOSIS — D47.2 MGUS (MONOCLONAL GAMMOPATHY OF UNKNOWN SIGNIFICANCE): ICD-10-CM

## 2023-12-19 DIAGNOSIS — Z23 ENCOUNTER FOR IMMUNIZATION: ICD-10-CM

## 2023-12-19 DIAGNOSIS — M79.7 FIBROMYALGIA: ICD-10-CM

## 2023-12-19 DIAGNOSIS — E78.5 DYSLIPIDEMIA: ICD-10-CM

## 2023-12-19 DIAGNOSIS — J31.0 CHRONIC RHINITIS: ICD-10-CM

## 2023-12-19 DIAGNOSIS — Z00.00 MEDICARE ANNUAL WELLNESS VISIT, SUBSEQUENT: ICD-10-CM

## 2023-12-19 DIAGNOSIS — I34.0 NONRHEUMATIC MITRAL VALVE REGURGITATION: ICD-10-CM

## 2023-12-19 DIAGNOSIS — E11.9 TYPE 2 DIABETES MELLITUS WITHOUT COMPLICATION, WITHOUT LONG-TERM CURRENT USE OF INSULIN (HCC): Primary | ICD-10-CM

## 2023-12-19 DIAGNOSIS — C73 MALIGNANT NEOPLASM OF THYROID GLAND (HCC): ICD-10-CM

## 2023-12-19 DIAGNOSIS — G25.81 RESTLESS LEGS SYNDROME: ICD-10-CM

## 2023-12-19 DIAGNOSIS — I36.1 NONRHEUMATIC TRICUSPID VALVE REGURGITATION: ICD-10-CM

## 2023-12-19 DIAGNOSIS — R32 URINARY INCONTINENCE, UNSPECIFIED TYPE: ICD-10-CM

## 2023-12-19 PROBLEM — F11.20 CONTINUOUS OPIOID DEPENDENCE (HCC): Status: RESOLVED | Noted: 2023-07-03 | Resolved: 2023-12-19

## 2023-12-19 PROBLEM — R39.9 UTI SYMPTOMS: Status: RESOLVED | Noted: 2023-09-29 | Resolved: 2023-12-19

## 2023-12-19 PROBLEM — D64.9 ANEMIA: Status: RESOLVED | Noted: 2023-08-04 | Resolved: 2023-12-19

## 2023-12-19 PROBLEM — E87.1 HYPONATREMIA: Status: RESOLVED | Noted: 2022-06-13 | Resolved: 2023-12-19

## 2023-12-19 PROCEDURE — 90662 IIV NO PRSV INCREASED AG IM: CPT

## 2023-12-19 PROCEDURE — 99214 OFFICE O/P EST MOD 30 MIN: CPT | Performed by: FAMILY MEDICINE

## 2023-12-19 PROCEDURE — G0008 ADMIN INFLUENZA VIRUS VAC: HCPCS

## 2023-12-19 PROCEDURE — G0439 PPPS, SUBSEQ VISIT: HCPCS | Performed by: FAMILY MEDICINE

## 2023-12-19 NOTE — PROGRESS NOTES
Assessment and Plan:     Problem List Items Addressed This Visit          Endocrine    Malignant neoplasm of thyroid gland (HCC)    Type 2 diabetes mellitus without complication, without long-term current use of insulin (HCC) - Primary    Relevant Orders    Hemoglobin A1C    Albumin / creatinine urine ratio       Respiratory    Chronic rhinitis    Relevant Orders    [place] Allergy Panel, Adult       Cardiovascular and Mediastinum    Essential hypertension    Relevant Orders    Comprehensive metabolic panel    CBC and differential    Chronic diastolic CHF (congestive heart failure) (HCC)    Pulmonary hypertension (HCC)    Nonrheumatic mitral valve regurgitation    Nonrheumatic aortic valve insufficiency    Nonrheumatic tricuspid valve regurgitation       Nervous and Auditory    Meningioma (HCC)       Other    Fibromyalgia    Restless legs syndrome    MGUS (monoclonal gammopathy of unknown significance)     Other Visit Diagnoses       Dyslipidemia        Relevant Orders    Lipid panel    Urinary incontinence, unspecified type        Medicare annual wellness visit, subsequent        Encounter for immunization        Relevant Orders    influenza vaccine, high-dose, PF 0.7 mL (FLUZONE HIGH-DOSE) (Completed)          Continue with current medications.  Allergy testing ordered to evaluate chronic cough.  Continue with Zyrtec, PPI.  Office visit with labs in 6 months.  She is scheduled for a follow-up thyroid ultrasound as well as ultrasound elastography.  Flu vaccine today        Depression Screening and Follow-up Plan: Patient was screened for depression during today's encounter. They screened negative with a PHQ-2 score of 0.    Falls Plan of Care: balance, strength, and gait training instructions were provided.     Urinary Incontinence Plan of Care: counseling topics discussed: limiting fluid intake 3-4 hours before bed.       Preventive health issues were discussed with patient, and age appropriate screening tests  were ordered as noted in patient's After Visit Summary.  Personalized health advice and appropriate referrals for health education or preventive services given if needed, as noted in patient's After Visit Summary.     History of Present Illness:     Patient presents for a Medicare Wellness Visit    Follow up visit for chronic medical problems. Type 2 DM diet controlled.  Last A1c 07/2023- 6.6. 01/2023  Urine albumin normal at 9.2. on ARB. No DPN. Current with eye exam. Hypertension  blood pressures have been stable on Losartan 50 mg BID, Metoprolol  mg BID,  Amlodipine 5 mg/day and Cardizem  mg BID. 09/2023 creatinine 0.78. GFR 72.  08/2023 Hgb 12.0.  06/2023 creatinine 0.88. GFR 63.  Hyperlipidemia on Zetia 10 mg daily. 07/2023 Cholesterol 163.  Triglycerides 68.  HDL 53.  LDL 96.           08/2023 admission Diagnosis atrial fibrillation with rapid ventricular response.  2 prior ablations with pacemaker.  Chronic diastolic CHF, acute blood loss secondary to recent left TKR and hyponatremia. 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  Patient presented to the hospital with fatigue and shortness of breath. CTA no acute PE.  Mild heterogeneous liver with slight lobulation along the left lobe.  Incidental thyroid nodule.  Venous Dopplers lower extremities no DVT.  Abdominal ultrasound hepatomegaly.  No evidence of cirrhosis or liver mass.  6 mm gallbladder polyp.  Troponin negative.  Elevated BNP at 651.  TSH 2.439.  Admission electrolytes normal for sodium 132.  LFTs normal except for total bilirubin 1.07.  Hemoglobin 10.0.  Patient was treated with IV Cardizem and started on oral Flecainide.  She ultimately underwent cardioversion.  Preprocedure SHANNAN Normal left ventricular systolic function.  EF 65%.  Wall motion normal.  Right ventricle normal.  Left atrium moderately dilated.  No thrombus.  No PFO.  Left atrial appendage dilated.  Normal function.  No thrombus.  Moderate TR. She  was discharged on Flecainide 100 mg BID, Cardizem  mg daily and Eliquis 5 mg BID.  .      06/2023 Echo  Left Ventricle: Left ventricular cavity size is normal. Wall thickness is normal. The left ventricular ejection fraction is 55%. Systolic function is normal. Wall motion is normal. Diastolic function is moderately abnormal, consistent with grade II (pseudonormal) relaxation.  ·  Right Ventricle: Right ventricular cavity size is normal. Systolic function is normal. A pacer wire is present.  ·  Left Atrium: The atrium is mildly dilated.  ·  Aortic Valve: There is mild regurgitation.  ·  Mitral Valve: There is moderate regurgitation.  ·  Tricuspid Valve: There is mild to moderate regurgitation. The right ventricular systolic pressure is moderately to severely elevated. The estimated right ventricular systolic pressure is 64.00 mmHg.            Lab Results   Component Value Date    HGBA1C 6.6 (H) 07/03/2023      Lab Results   Component Value Date    WBC 6.27 08/28/2023    HGB 12.0 08/28/2023    HCT 38.3 08/28/2023    MCV 97 08/28/2023     08/28/2023     Lab Results   Component Value Date    SODIUM 133 (L) 09/18/2023    K 4.4 09/18/2023    CL 97 09/18/2023    CO2 28 09/18/2023    BUN 20 09/18/2023    CREATININE 0.78 09/18/2023    GLUC 191 (H) 08/09/2023    CALCIUM 9.7 09/18/2023     Lab Results   Component Value Date    CHOLESTEROL 163 07/03/2023    CHOLESTEROL 176 04/01/2021    CHOLESTEROL 182 08/03/2020     Lab Results   Component Value Date    HDL 53 07/03/2023    HDL 66 04/01/2021    HDL 54 08/03/2020     Lab Results   Component Value Date    TRIG 68 07/03/2023    TRIG 112 04/01/2021    TRIG 121 08/03/2020     Lab Results   Component Value Date    LDLCALC 96 07/03/2023            Patient Care Team:  Naveen Monsivais MD as PCP - General  Boaz Tristan, SHANE Bhakta, MD Nir Garvin, MD Pop Johnson MD (Pain Medicine)  JENNY Strong  (Pain Medicine)     Review of Systems:     Review of Systems   Constitutional:  Negative for appetite change, chills, fatigue, fever and unexpected weight change.   HENT:  Positive for hearing loss (bilateral hearing aids ). Negative for congestion, ear pain, rhinorrhea, sore throat and trouble swallowing.    Eyes:  Negative for visual disturbance.   Respiratory:  Positive for cough. Negative for shortness of breath and wheezing.         Chronic cough non productive. No nasal congestion or postnasal drainage.  On daily Zyrtec.  No reflux symptoms on daily PPI.  No history of asthma.  No prior allergy testing.8/2023 CTA no PE.  5 mm left lower lobe nodule stable from 2021.  No tracheal or endobronchial lesions.  Pleura normal   Cardiovascular:  Negative for chest pain, palpitations and leg swelling.        See HPI.  Repeat echocardiogram 12/2023 left ventricular cavity size normal.  Left ventricular ejection fraction 55%.  Left ventricular systolic function normal.  Wall motion normal.  Diastolic function abnormal.  Dilated left atrium.  Moderate MR.  Moderate TR.06/2022 admission for CHF, hyponatremia with altered mental status.  Chest x-ray shows small bilateral effusions. .  Sodium prior to admission 125.  On repeat 128 in the setting of volume overload.  Patient was treated with IV diuresis with symptomatic improvement.  Discharge sodium 136.   Mental status improved back to baseline.  Prior  cardio versions. 03/2021 admission for atrial fibrillation status post ablation procedure. Pre procedure episode of bradycardia with QT prolongation and torsades successfully defibrillated.  Subsequent pacemaker placed. Repeat ablation 08/2022 at [facility].        Gastrointestinal:  Negative for abdominal pain, blood in stool, constipation, diarrhea, nausea and vomiting.        See HPI. Colonoscopy 04/2018 Two benign polyps.  Mild diverticulosis left-sided colon.   Endocrine: Negative for polydipsia and polyuria.         See HPI.  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.09/2018 DEXA scan T-score -1.5 left femoral neck. On vitamin D supplement    Lab Results       Component                Value               Date                       [id number]             2.439               08/04/2023                                                          Genitourinary:  Negative for difficulty urinating.   Musculoskeletal:  Negative for arthralgias and myalgias.        See HPI. OA shoulders.  07/2019 x-rays right shoulder mild degenerative arthritis right AC joint.  Left shoulder mild degenerative changes left AC joint.  08/2019 bilateral shoulder intra-articular steroid injections via fluoroscopy with symptomatic relief. S/p bilateral TKR.    Skin:  Negative for rash.   Neurological:  Positive for tremors. Negative for dizziness and headaches.        CNS meningioma. 10/2023 MRI brain Stable meningioma within the left anterior lateral anterior cranial fossa.  Stable advanced chronic microangiopathic change   04/2022 MRI brain 4.2 x 1.7 x 3.0 cm fairly homogeneously enhancing, extra-axial mass within the inferior lateral aspect of the left anterior cranial fossa.  Mild adjacent dural thickening and enhancement is seen extending inferiorly into the middle cranial fossa and superiorly into the dura of the anterior frontal vertex.  Findings are most suggestive of meningioma. [facility] 05/2022. Second opinion at [facility]. RLS on Requip. 04/2021 neurology evaluation for tremor suspected benign essential  tremor.  She is on a Beta-blocker and Gabapentin.  Family history + essential tremor brother.    Hematological:  Negative for adenopathy. Bruises/bleeds easily (on Eliquis).        Monoclonal gammopathy. 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. 1/2023 IgG level normal at 905        Lab Results        Component                Value               Date                       WBC                      6.27                08/28/2023                 HGB                      12.0                08/28/2023                 HCT                      38.3                08/28/2023                 MCV                      97                  08/28/2023                 PLT                      336                 08/28/2023                Lab Results       Component                Value               Date                       [id number]               799                 07/01/2022                          MMA elevated at 489   Psychiatric/Behavioral:  Positive for dysphoric mood. Negative for sleep disturbance. The patient is nervous/anxious.         Stable on low dose Duloxetine 30 mg/day.  Chronic insomnia on prn Zolpidem 10 mg                  Problem List:     Patient Active Problem List   Diagnosis    Essential hypertension    Allergic rhinitis    Fibromyalgia    Hyperlipidemia    Insomnia    Lumbar spondylosis    Lumbar stenosis    Osteoarthrosis, hand    Osteoarthritis of knee    Osteopenia    Peripheral neuropathy    Restless legs syndrome    TMJ syndrome    Vitamin D deficiency    Osteoarthritis of shoulder    Carpal tunnel syndrome on right    Arthritis of both acromioclavicular joints    Chronic rhinitis    Chronic diastolic CHF (congestive heart failure) (HCC)    Malignant neoplasm of thyroid gland (HCC)    Current use of long term anticoagulation    S/P placement of cardiac pacemaker    Tremors of nervous system    Hx of diplopia    Hurthle cell adenoma of thyroid    MGUS (monoclonal gammopathy of unknown significance)    Vasomotor rhinitis    Chronic tension-type headache, not intractable    Stage 3b chronic kidney disease (HCC)    Meningioma (HCC)    Pulmonary hypertension (HCC)    Type 2 diabetes mellitus without complication, without long-term current use of insulin (HCC)    Nonrheumatic mitral valve  regurgitation    Nonrheumatic aortic valve insufficiency    Nonrheumatic tricuspid valve regurgitation    Abnormal CT of the chest    S/P revision of total knee, left      Past Medical and Surgical History:     Past Medical History:   Diagnosis Date    Actinic keratosis     last assessed - 06Jun2014    Arthritis     Atrial fibrillation with rapid ventricular response (HCC)     last assessed - 26Apr2016    Basal cell carcinoma     Benign colon polyp     last assessed - 27Apr2015    CHF (congestive heart failure) (HCC) 06/2022    Disease of thyroid gland     Effusion of knee joint right     Resolved - 19Apr2016    Esophageal reflux     Fibromyalgia     last assessed - [id number]    Fibromyalgia, primary     GERD (gastroesophageal reflux disease)     Hypertension     Palpitations     last assessed - 30Apr2013    Peroneal tendonitis, right     last assessed - 02Oct2013    Right lumbar radiculopathy 03/17/2016    Thyroid cancer (HCC)     Thyroid nodule     Trochanteric bursitis of left hip 03/09/2018     Past Surgical History:   Procedure Laterality Date    CARDIAC ELECTROPHYSIOLOGY STUDY AND ABLATION  08/2022    CATARACT EXTRACTION Bilateral     COLONOSCOPY  03/2018    COLONOSCOPY W/ POLYPECTOMY  2021    repeat in 5 years    EYE SURGERY      HYSTERECTOMY      JOINT REPLACEMENT Left     knee    NC NEUROPLASTY &/TRANSPOS MEDIAN NRV CARPAL TUNNE Right 11/14/2019    Procedure: RELEASE CARPAL TUNNEL;  Surgeon: Onesimo Tate MD;  Location: [facility];  Service: Orthopedics    NC TOTAL THYROID LOBECTOMY UNI W/WO ISTHMUSECTOMY Left 12/16/2020    Procedure: Left THYROID LOBECTOMY;  Surgeon: Jhony Bhatt MD;  Location: [facility];  Service: ENT    RECTAL POLYPECTOMY      REPLACEMENT TOTAL KNEE Left     last assessed - 27Apr2015    TONSILLECTOMY      TOTAL ABDOMINAL HYSTERECTOMY      TUBAL LIGATION      US GUIDED THYROID BIOPSY  10/14/2020      Family History:     Family History   Problem Relation Age of Onset    Heart disease  Mother     Diabetes Mother     Heart disease Father     Coronary artery disease Father     Stroke Father         cerebrovascular accident    Heart attack Father         myocardial infarction    Sudden death Father         scd    Other Family         Back disorder    Coronary artery disease Family     Neuropathy Family     Osteoporosis Family     No Known Problems Daughter     No Known Problems Maternal Grandmother     No Known Problems Maternal Grandfather     No Known Problems Paternal Grandmother     No Known Problems Paternal Grandfather     Cancer Maternal Uncle     Breast cancer Maternal Aunt 65    No Known Problems Son     No Known Problems Maternal Aunt     No Known Problems Maternal Aunt     No Known Problems Maternal Aunt     No Known Problems Paternal Aunt     No Known Problems Paternal Aunt     Anuerysm Neg Hx     Clotting disorder Neg Hx     Arrhythmia Neg Hx     Heart failure Neg Hx       Social History:     Social History     Socioeconomic History    Marital status:      Spouse name: None    Number of children: None    Years of education: None    Highest education level: None   Occupational History    None   Tobacco Use    Smoking status: Never    Smokeless tobacco: Never   Vaping Use    Vaping status: Never Used   Substance and Sexual Activity    Alcohol use: Not Currently    Drug use: Never    Sexual activity: Not Currently   Other Topics Concern    None   Social History Narrative    None     Social Determinants of Health     Financial Resource Strain: Patient Unable To Answer (12/19/2023)    Overall Financial Resource Strain (CARDIA)     Difficulty of Paying Living Expenses: Patient unable to answer   Food Insecurity: No Food Insecurity (7/31/2023)    Received from [facility]    Hunger Vital Sign     Worried About Running Out of Food in the Last Year: Never true     Ran Out of Food in the Last Year: Never true   Transportation Needs: No Transportation Needs (12/19/2023)     PRAPARE - Transportation     Lack of Transportation (Medical): No     Lack of Transportation (Non-Medical): No   Physical Activity: Not on file   Stress: Not on file   Social Connections: Not on file   Intimate Partner Violence: Not At Risk (7/31/2023)    Received from [facility]    Humiliation, Afraid, Rape, and Kick questionnaire     Fear of Current or Ex-Partner: No     Emotionally Abused: No     Physically Abused: No     Sexually Abused: No   Housing Stability: Low Risk  (7/31/2023)    Received from [facility]    Housing Stability Vital Sign     Unable to Pay for Housing in the Last Year: No     Number of Places Lived in the Last Year: 1     Unstable Housing in the Last Year: No      Medications and Allergies:     Current Outpatient Medications   Medication Sig Dispense Refill    apixaban (ELIQUIS) 5 mg Take 1 tablet (5 mg total) by mouth 2 (two) times a day 42 tablet 0    ascorbic acid (VITAMIN C) 500 mg tablet Take 500 mg by mouth daily       Cetirizine HCl (ZyrTEC Allergy) 10 MG CAPS Take 10 mg by mouth in the morning      Cholecalciferol 50 MCG (2000 UT) CAPS Take 2,000 Units by mouth 2 (two) times a day      diltiazem (CARDIZEM CD) 120 mg 24 hr capsule Take 1 capsule (120 mg total) by mouth 2 (two) times a day 180 capsule 3    DULoxetine (CYMBALTA) 30 mg delayed release capsule take 1 capsule by mouth once daily 30 capsule 2    ezetimibe (ZETIA) 10 mg tablet take 1 tablet by mouth once daily 90 tablet 3    flecainide (TAMBOCOR) 100 mg tablet Take 1 tablet (100 mg total) by mouth 2 (two) times a day 75969 tablet 3    furosemide (LASIX) 20 mg tablet take 2 tablet by mouth daily if needed for shortness of breath WEIGHT GAIN 3 LBS IN 1 DAY OR LOWER LEG SWELLING 20 tablet 3    gabapentin (NEURONTIN) 300 mg capsule Take 1 capsule (300 mg total) by mouth daily at bedtime 90 capsule 1    ipratropium (ATROVENT) 0.03 % nasal spray 2 sprays into each nostril 3 (three) times a day  Begin once per day, then progress to twice per day. Progress to three times a day as tolerated. 90 mL 3    lidocaine (XYLOCAINE) 5 % ointment Apply topically as needed for mild pain 35.44 g 0    losartan (COZAAR) 50 mg tablet Take 1 tablet (50 mg total) by mouth 2 (two) times a day 180 tablet 3    metoprolol succinate (TOPROL-XL) 25 mg 24 hr tablet TAKE 4 TABLETS BY MOUTH EVERY 12 HOURS 240 tablet 1    rOPINIRole (REQUIP) 1 mg tablet Take 2 tablets (2 mg total) by mouth daily at bedtime 180 tablet 1    Saccharomyces boulardii (Probiotic) 250 MG CAPS Take 1 capsule by mouth daily      TURMERIC PO Take 1 capsule by mouth 2 (two) times a day      zolpidem (AMBIEN) 10 mg tablet Take 1 tablet (10 mg total) by mouth daily at bedtime as needed for sleep 90 tablet 1    Arthritis Pain Relief 650 MG CR tablet Take 650 mg by mouth 3 (three) times a day (Patient not taking: Reported on 11/30/2023)      Chromium Picolinate (CHROMIUM PICOLATE PO) Take 1 tablet by mouth daily (Patient not taking: Reported on 11/30/2023)       No current facility-administered medications for this visit.     Allergies   Allergen Reactions    Sotalol Other (See Comments)     Prolonged QTc leading to torsades de pointes     Penicillins Other (See Comments)     As a child calcium deposit in the arm     Ace Inhibitors GI Intolerance     Did feel well on it    Omeprazole Abdominal Pain, Rash and Vomiting     stomach pain, vomiting, rash      Immunizations:     Immunization History   Administered Date(s) Administered    COVID-19 MODERNA VACC 0.5 ML IM 01/27/2021, 02/24/2021    INFLUENZA 12/23/2015, 11/07/2016, 10/18/2017, 12/04/2018, 09/19/2022    Influenza Split High Dose Preservative Free IM 10/01/2014, 12/23/2015, 11/07/2016, 10/18/2017    Influenza, high dose seasonal 0.7 mL 12/04/2018, 09/26/2019, 09/08/2020, 11/03/2021, 09/19/2022, 12/19/2023    Influenza, seasonal, injectable 10/16/2012    Pneumococcal Conjugate 13-Valent 04/27/2015    Pneumococcal  Polysaccharide PPV23 03/29/2022      Health Maintenance:         Topic Date Due    Breast Cancer Screening: Mammogram  02/20/2024    Hepatitis C Screening  07/29/2024 (Originally 1945)         Topic Date Due    COVID-19 Vaccine (3 - 2023-24 season) 09/01/2023      Medicare Screening Tests and Risk Assessments:     Farnaz is here for her Subsequent Wellness visit. Last Medicare Wellness visit information reviewed, patient interviewed and updates made to the record today.      Health Risk Assessment:   Patient rates overall health as good. Patient feels that their physical health rating is same. Patient is very satisfied with their life. Eyesight was rated as same. Hearing was rated as same. Patient feels that their emotional and mental health rating is same. Patients states they are never, rarely angry. Patient states they are sometimes unusually tired/fatigued. Pain experienced in the last 7 days has been some. Patient's pain rating has been 4/10. Patient states that she has experienced no weight loss or gain in last 6 months.     Depression Screening:   PHQ-2 Score: 0      Fall Risk Screening:   In the past year, patient has experienced: history of falling in past year    Number of falls: 2 or more  Injured during fall?: No    Feels unsteady when standing or walking?: Yes    Worried about falling?: Yes      Urinary Incontinence Screening:   Patient has leaked urine accidently in the last six months. UI managed w/out medication     Home Safety:  Patient has trouble with stairs inside or outside of their home. Patient has working smoke alarms and has working carbon monoxide detector. Home safety hazards include: none.     Nutrition:   Current diet is Regular, Other (please comment) and Diabetic. Low salt    Medications:   Patient is currently taking over-the-counter supplements. OTC medications include: see medication list. Patient is able to manage medications.     Activities of Daily Living (ADLs)/Instrumental  Activities of Daily Living (IADLs):   Walk and transfer into and out of bed and chair?: Yes  Dress and groom yourself?: Yes    Bathe or shower yourself?: Yes    Feed yourself? Yes  Do your laundry/housekeeping?: Yes  Manage your money, pay your bills and track your expenses?: Yes  Make your own meals?: Yes    Do your own shopping?: Yes    Previous Hospitalizations:   Any hospitalizations or ED visits within the last 12 months?: Yes    How many hospitalizations have you had in the last year?: 1-2    Advance Care Planning:   Living will: Yes    Advanced directive: Yes      Cognitive Screening:   Provider or family/friend/caregiver concerned regarding cognition?: No    PREVENTIVE SCREENINGS      Cardiovascular Screening:    General: History Lipid Disorder and Screening Current      Diabetes Screening:     General: Screening Not Indicated and History Diabetes      Colorectal Cancer Screening:     General: Screening Current      Breast Cancer Screening:     General: Screening Current      Cervical Cancer Screening:    General: Screening Not Indicated      Osteoporosis Screening:    General: Screening Current      Abdominal Aortic Aneurysm (AAA) Screening:        General: Screening Not Indicated      Lung Cancer Screening:     General: Screening Not Indicated      Hepatitis C Screening:    General: Screening Current    Screening, Brief Intervention, and Referral to Treatment (SBIRT)    Screening  Typical number of drinks in a day: 0  Typical number of drinks in a week: 0  Interpretation: Low risk drinking behavior.    Single Item Drug Screening:  How often have you used an illegal drug (including marijuana) or a prescription medication for non-medical reasons in the past year? never    Single Item Drug Screen Score: 0  Interpretation: Negative screen for possible drug use disorder    Brief Intervention  Alcohol & drug use screenings were reviewed. No concerns regarding substance use disorder identified.     Review of  "Current Opioid Use  Opioid Risk Tool (ORT) Score: 0  Opioid Risk Tool (ORT) Interpretation: Score 0-3: Low risk for opioid misuse    [place]MP or [place]  reviewed. No red flags were identified    Other Counseling Topics:   Regular weightbearing exercise and calcium and vitamin D intake.          Physical Exam:     /80 (BP Location: Left arm, Patient Position: Sitting, Cuff Size: Standard)   Pulse 65   Temp (!) 97.3 °F (36.3 °C)   Resp 18   Ht 5' 2\" (1.575 m)   Wt 72.6 kg (160 lb)   LMP 02/01/1990 (Within Weeks)   SpO2 98%   BMI 29.26 kg/m²     BP Readings from Last 3 Encounters:   12/19/23 140/80   12/05/23 154/82   11/30/23 154/82      Wt Readings from Last 3 Encounters:   12/19/23 72.6 kg (160 lb)   12/05/23 71.7 kg (158 lb)   11/30/23 71.7 kg (158 lb)          Physical Exam  Constitutional:       General: She is not in acute distress.  HENT:      Right Ear: Tympanic membrane and ear canal normal.      Left Ear: Tympanic membrane and ear canal normal.   Eyes:      General: No scleral icterus.     Extraocular Movements: Extraocular movements intact.      Conjunctiva/sclera: Conjunctivae normal.      Pupils: Pupils are equal, round, and reactive to light.   Neck:      Vascular: No carotid bruit.   Cardiovascular:      Rate and Rhythm: Normal rate and regular rhythm.      Pulses: no weak pulses          Dorsalis pedis pulses are 2+ on the right side and 2+ on the left side.        Posterior tibial pulses are 2+ on the right side and 2+ on the left side.      Heart sounds: No murmur heard.     No gallop.   Pulmonary:      Effort: Pulmonary effort is normal.      Breath sounds: Normal breath sounds. No wheezing or rales.   Abdominal:      General: Bowel sounds are normal. There is no distension.      Palpations: Abdomen is soft. There is no hepatomegaly, splenomegaly or mass.      Tenderness: There is no abdominal tenderness. There is no guarding or rebound.   Musculoskeletal:      Right lower leg: No edema. "      Left lower leg: No edema.   Feet:      Right foot:      Skin integrity: No ulcer, skin breakdown, erythema, warmth, callus or dry skin.      Left foot:      Skin integrity: No ulcer, skin breakdown, erythema, warmth, callus or dry skin.   Lymphadenopathy:      Cervical: No cervical adenopathy.   Skin:     Findings: No rash.   Neurological:      General: No focal deficit present.      Mental Status: She is alert and oriented to person, place, and time.   Psychiatric:         Mood and Affect: Mood normal.         Behavior: Behavior normal.         Cognition and Memory: Cognition normal.        Patient's shoes and socks removed.    Right Foot/Ankle   Right Foot Inspection  Skin Exam: skin normal and skin intact. No dry skin, no warmth, no callus, no erythema, no maceration, no abnormal color, no pre-ulcer, no ulcer and no callus.     Toe Exam: ROM and strength within normal limits. No swelling, no tenderness, erythema and  no right toe deformity    Sensory   Monofilament testing: intact    Vascular  Capillary refills: < 3 seconds  The right DP pulse is 2+. The right PT pulse is 2+.     Left Foot/Ankle  Left Foot Inspection  Skin Exam: skin normal and skin intact. No dry skin, no warmth, no erythema, no maceration, normal color, no pre-ulcer, no ulcer and no callus.     Toe Exam: ROM and strength within normal limits. No swelling, no tenderness, no erythema and no left toe deformity.     Sensory   Monofilament testing: intact    Vascular  Capillary refills: < 3 seconds  The left DP pulse is 2+. The left PT pulse is 2+.     Assign Risk Category  No deformity present  No loss of protective sensation  No weak pulses  Risk: 0       Naveen Monsivais MD

## 2023-12-19 NOTE — PATIENT INSTRUCTIONS
Medicare Preventive Visit Patient Instructions  Thank you for completing your Welcome to Medicare Visit or Medicare Annual Wellness Visit today. Your next wellness visit will be due in one year (12/19/2024).  The screening/preventive services that you may require over the next 5-10 years are detailed below. Some tests may not apply to you based off risk factors and/or age. Screening tests ordered at today's visit but not completed yet may show as past due. Also, please note that scanned in results may not display below.  Preventive Screenings:  Service Recommendations Previous Testing/Comments   Colorectal Cancer Screening  * Colonoscopy    * Fecal Occult Blood Test (FOBT)/Fecal Immunochemical Test (FIT)  * Fecal DNA/Cologuard Test  * Flexible Sigmoidoscopy Age: 45-75 years old   Colonoscopy: every 10 years (may be performed more frequently if at higher risk)  OR  FOBT/FIT: every 1 year  OR  Cologuard: every 3 years  OR  Sigmoidoscopy: every 5 years  Screening may be recommended earlier than age 45 if at higher risk for colorectal cancer. Also, an individualized decision between you and your healthcare provider will decide whether screening between the ages of 76-85 would be appropriate. Colonoscopy: 08/31/2021  FOBT/FIT: Not on file  Cologuard: Not on file  Sigmoidoscopy: Not on file          Breast Cancer Screening Age: 40+ years old  Frequency: every 1-2 years  Not required if history of left and right mastectomy Mammogram: 02/20/2023    Screening Current   Cervical Cancer Screening Between the ages of 21-29, pap smear recommended once every 3 years.   Between the ages of 30-65, can perform pap smear with HPV co-testing every 5 years.   Recommendations may differ for women with a history of total hysterectomy, cervical cancer, or abnormal pap smears in past. Pap Smear: 10/06/2022    Screening Not Indicated   Hepatitis C Screening Once for adults born between 1945 and 1965  More frequently in patients at high risk  for Hepatitis C Hep C Antibody: Not on file    Screening Current   Diabetes Screening 1-2 times per year if you're at risk for diabetes or have pre-diabetes Fasting glucose: 112 mg/dL (9/18/2023)  A1C: 6.6 % (7/3/2023)  Screening Not Indicated  History Diabetes   Cholesterol Screening Once every 5 years if you don't have a lipid disorder. May order more often based on risk factors. Lipid panel: 07/03/2023    Screening Not Indicated  History Lipid Disorder     Other Preventive Screenings Covered by Medicare:  Abdominal Aortic Aneurysm (AAA) Screening: covered once if your at risk. You're considered to be at risk if you have a family history of AAA.  Lung Cancer Screening: covers low dose CT scan once per year if you meet all of the following conditions: (1) Age 55-77; (2) No signs or symptoms of lung cancer; (3) Current smoker or have quit smoking within the last 15 years; (4) You have a tobacco smoking history of at least 20 pack years (packs per day multiplied by number of years you smoked); (5) You get a written order from a healthcare provider.  Glaucoma Screening: covered annually if you're considered high risk: (1) You have diabetes OR (2) Family history of glaucoma OR (3)  aged 50 and older OR (4)  American aged 65 and older  Osteoporosis Screening: covered every 2 years if you meet one of the following conditions: (1) You're estrogen deficient and at risk for osteoporosis based off medical history and other findings; (2) Have a vertebral abnormality; (3) On glucocorticoid therapy for more than 3 months; (4) Have primary hyperparathyroidism; (5) On osteoporosis medications and need to assess response to drug therapy.   Last bone density test (DXA Scan): 02/17/2022.  HIV Screening: covered annually if you're between the age of 15-65. Also covered annually if you are younger than 15 and older than 65 with risk factors for HIV infection. For pregnant patients, it is covered up to 3 times  per pregnancy.    Immunizations:  Immunization Recommendations   Influenza Vaccine Annual influenza vaccination during flu season is recommended for all persons aged >= 6 months who do not have contraindications   Pneumococcal Vaccine   * Pneumococcal conjugate vaccine = PCV13 (Prevnar 13), PCV15 (Vaxneuvance), PCV20 (Prevnar 20)  * Pneumococcal polysaccharide vaccine = PPSV23 (Pneumovax) Adults 19-65 yo with certain risk factors or if 65+ yo  If never received any pneumonia vaccine: recommend Prevnar 20 (PCV20)  Give PCV20 if previously received 1 dose of PCV13 or PPSV23   Hepatitis B Vaccine 3 dose series if at intermediate or high risk (ex: diabetes, end stage renal disease, liver disease)   Respiratory syncytial virus (RSV) Vaccine - COVERED BY MEDICARE PART D  * RSVPreF3 (Arexvy) CDC recommends that adults 60 years of age and older may receive a single dose of RSV vaccine using shared clinical decision-making (SCDM)   Tetanus (Td) Vaccine - COST NOT COVERED BY MEDICARE PART B Following completion of primary series, a booster dose should be given every 10 years to maintain immunity against tetanus. Td may also be given as tetanus wound prophylaxis.   Tdap Vaccine - COST NOT COVERED BY MEDICARE PART B Recommended at least once for all adults. For pregnant patients, recommended with each pregnancy.   Shingles Vaccine (Shingrix) - COST NOT COVERED BY MEDICARE PART B  2 shot series recommended in those 19 years and older who have or will have weakened immune systems or those 50 years and older     Health Maintenance Due:      Topic Date Due   • Breast Cancer Screening: Mammogram  02/20/2024   • Hepatitis C Screening  07/29/2024 (Originally 1945)     Immunizations Due:      Topic Date Due   • Influenza Vaccine (1) 09/01/2023   • COVID-19 Vaccine (3 - 2023-24 season) 09/01/2023     Advance Directives   What are advance directives?  Advance directives are legal documents that state your wishes and plans for  medical care. These plans are made ahead of time in case you lose your ability to make decisions for yourself. Advance directives can apply to any medical decision, such as the treatments you want, and if you want to donate organs.   What are the types of advance directives?  There are many types of advance directives, and each state has rules about how to use them. You may choose a combination of any of the following:  Living will:  This is a written record of the treatment you want. You can also choose which treatments you do not want, which to limit, and which to stop at a certain time. This includes surgery, medicine, IV fluid, and tube feedings.   Durable power of  for healthcare (DPAHC):  This is a written record that states who you want to make healthcare choices for you when you are unable to make them for yourself. This person, called a proxy, is usually a family member or a friend. You may choose more than 1 proxy.  Do not resuscitate (DNR) order:  A DNR order is used in case your heart stops beating or you stop breathing. It is a request not to have certain forms of treatment, such as CPR. A DNR order may be included in other types of advance directives.  Medical directive:  This covers the care that you want if you are in a coma, near death, or unable to make decisions for yourself. You can list the treatments you want for each condition. Treatment may include pain medicine, surgery, blood transfusions, dialysis, IV or tube feedings, and a ventilator (breathing machine).  Values history:  This document has questions about your views, beliefs, and how you feel and think about life. This information can help others choose the care that you would choose.  Why are advance directives important?  An advance directive helps you control your care. Although spoken wishes may be used, it is better to have your wishes written down. Spoken wishes can be misunderstood, or not followed. Treatments may be given  even if you do not want them. An advance directive may make it easier for your family to make difficult choices about your care.   Fall Prevention    Fall prevention  includes ways to make your home and other areas safer. It also includes ways you can move more carefully to prevent a fall. Health conditions that cause changes in your blood pressure, vision, or muscle strength and coordination may increase your risk for falls. Medicines may also increase your risk for falls if they make you dizzy, weak, or sleepy.   Fall prevention tips:   Stand or sit up slowly.    Use assistive devices as directed.    Wear shoes that fit well and have soles that .    Wear a personal alarm.    Stay active.    Manage your medical conditions.    Home Safety Tips:  Add items to prevent falls in the bathroom.    Keep paths clear.    Install bright lights in your home.    Keep items you use often on shelves within reach.    Paint or place reflective tape on the edges of your stairs.    Urinary Incontinence   Urinary incontinence (UI)  is when you lose control of your bladder. UI develops because your bladder cannot store or empty urine properly. The 3 most common types of UI are stress incontinence, urge incontinence, or both.  Medicines:   May be given to help strengthen your bladder control. Report any side effects of medication to your healthcare provider.  Do pelvic muscle exercises often:  Your pelvic muscles help you stop urinating. Squeeze these muscles tight for 5 seconds, then relax for 5 seconds. Gradually work up to squeezing for 10 seconds. Do 3 sets of 15 repetitions a day, or as directed. This will help strengthen your pelvic muscles and improve bladder control.  Train your bladder:  Go to the bathroom at set times, such as every 2 hours, even if you do not feel the urge to go. You can also try to hold your urine when you feel the urge to go. For example, hold your urine for 5 minutes when you feel the urge to go. As  that becomes easier, hold your urine for 10 minutes.   Self-care:   Keep a UI record.  Write down how often you leak urine and how much you leak. Make a note of what you were doing when you leaked urine.  Drink liquids as directed. You may need to limit the amount of liquid you drink to help control your urine leakage. Do not drink any liquid right before you go to bed. Limit or do not have drinks that contain caffeine or alcohol.   Prevent constipation.  Eat a variety of high-fiber foods. Good examples are high-fiber cereals, beans, vegetables, and whole-grain breads. Walking is the best way to trigger your intestines to have a bowel movement.  Exercise regularly and maintain a healthy weight.  Weight loss and exercise will decrease pressure on your bladder and help you control your leakage.   Use a catheter as directed  to help empty your bladder. A catheter is a tiny, plastic tube that is put into your bladder to drain your urine.   Go to behavior therapy as directed.  Behavior therapy may be used to help you learn to control your urge to urinate.    Weight Management   Why it is important to manage your weight:  Being overweight increases your risk of health conditions such as heart disease, high blood pressure, type 2 diabetes, and certain types of cancer. It can also increase your risk for osteoarthritis, sleep apnea, and other respiratory problems. Aim for a slow, steady weight loss. Even a small amount of weight loss can lower your risk of health problems.  How to lose weight safely:  A safe and healthy way to lose weight is to eat fewer calories and get regular exercise. You can lose up about 1 pound a week by decreasing the number of calories you eat by 500 calories each day.   Healthy meal plan for weight management:  A healthy meal plan includes a variety of foods, contains fewer calories, and helps you stay healthy. A healthy meal plan includes the following:  Eat whole-grain foods more often.  A  healthy meal plan should contain fiber. Fiber is the part of grains, fruits, and vegetables that is not broken down by your body. Whole-grain foods are healthy and provide extra fiber in your diet. Some examples of whole-grain foods are whole-wheat breads and pastas, oatmeal, brown rice, and bulgur.  Eat a variety of vegetables every day.  Include dark, leafy greens such as spinach, kale, anayeli greens, and mustard greens. Eat yellow and orange vegetables such as carrots, sweet potatoes, and winter squash.   Eat a variety of fruits every day.  Choose fresh or canned fruit (canned in its own juice or light syrup) instead of juice. Fruit juice has very little or no fiber.  Eat low-fat dairy foods.  Drink fat-free (skim) milk or 1% milk. Eat fat-free yogurt and low-fat cottage cheese. Try low-fat cheeses such as mozzarella and other reduced-fat cheeses.  Choose meat and other protein foods that are low in fat.  Choose beans or other legumes such as split peas or lentils. Choose fish, skinless poultry (chicken or turkey), or lean cuts of red meat (beef or pork). Before you cook meat or poultry, cut off any visible fat.   Use less fat and oil.  Try baking foods instead of frying them. Add less fat, such as margarine, sour cream, regular salad dressing and mayonnaise to foods. Eat fewer high-fat foods. Some examples of high-fat foods include french fries, doughnuts, ice cream, and cakes.  Eat fewer sweets.  Limit foods and drinks that are high in sugar. This includes candy, cookies, regular soda, and sweetened drinks.  Exercise:  Exercise at least 30 minutes per day on most days of the week. Some examples of exercise include walking, biking, dancing, and swimming. You can also fit in more physical activity by taking the stairs instead of the elevator or parking farther away from stores. Ask your healthcare provider about the best exercise plan for you.   Narcotic (Opioid) Safety    Use narcotics safely:  Take prescribed  narcotics exactly as directed  Do not give narcotics to others or take narcotics that belong to someone else  Do not mix narcotics without medicines or alcohol  Do not drive or operate heavy machinery after you take the narcotic  Monitor for side effects and notify your healthcare provider if you experienced side effects such as nausea, sleepiness, itching, or trouble thinking clearly.    Manage constipation:    Constipation is the most common side effect of narcotic medicine. Constipation is when you have hard, dry bowel movements, or you go longer than usual between bowel movements. Tell your healthcare provider about all changes in your bowel movements while you are taking narcotics. He or she may recommend laxative medicine to help you have a bowel movement. He or she may also change the kind of narcotic you are taking, or change when you take it. The following are more ways you can prevent or relieve constipation:    Drink liquids as directed.  You may need to drink extra liquids to help soften and move your bowels. Ask how much liquid to drink each day and which liquids are best for you.  Eat high-fiber foods.  This may help decrease constipation by adding bulk to your bowel movements. High-fiber foods include fruits, vegetables, whole-grain breads and cereals, and beans. Your healthcare provider or dietitian can help you create a high-fiber meal plan. Your provider may also recommend a fiber supplement if you cannot get enough fiber from food.  Exercise regularly.  Regular physical activity can help stimulate your intestines. Walking is a good exercise to prevent or relieve constipation. Ask which exercises are best for you.  Schedule a time each day to have a bowel movement.  This may help train your body to have regular bowel movements. Bend forward while you are on the toilet to help move the bowel movement out. Sit on the toilet for at least 10 minutes, even if you do not have a bowel movement.    Store  narcotics safely:   Store narcotics where others cannot easily get them.  Keep them in a locked cabinet or secure area. Do not  keep them in a purse or other bag you carry with you. A person may be looking for something else and find the narcotics.  Make sure narcotics are stored out of the reach of children.  A child can easily overdose on narcotics. Narcotics may look like candy to a small child.    The best way to dispose of narcotics:      The laws vary by country and area. In the [place], the best way is to return the narcotics through a take-back program. This program is offered by the [place] Drug Enforcement Agency (RAN). The following are options for using the program:  Take the narcotics to a RAN collection site.  The site is often a law enforcement center. Call your local law enforcement center for scheduled take-back days in your area. You will be given information on where to go if the collection site is in a different location.  Take the narcotics to an approved pharmacy or hospital.  A pharmacy or hospital may be set up as a collection site. You will need to ask if it is a RAN collection site if you were not directed there. A pharmacy or doctor's office may not be able to take back narcotics unless it is a RAN site.  Use a mail-back system.  This means you are given containers to put the narcotics into. You will then mail them in the containers.  Use a take-back drop box.  This is a place to leave the narcotics at any time. People and animals will not be able to get into the box. Your local law enforcement agency can tell you where to find a drop box in your area.    Other ways to manage pain:   Ask your healthcare provider about non-narcotic medicines to control pain.  Nonprescription medicines include NSAIDs (such as ibuprofen) and acetaminophen. Prescription medicines include muscle relaxers, antidepressants, and steroids.  Pain may be managed without any medicines.  Some ways to relieve pain  include massage, aromatherapy, or meditation. Physical or occupational therapy may also help.    For more information:   Drug Enforcement Administration  [address] [place] [address]  Phone: 8- 473 - 104-3767  Web Address: https://[email or web address] [email or web address].gov/drug_disposal/    US Food and Drug Administration  [address] [place] [address]  Phone: 0- 676 - 966-4656  Web Address: http://www.fda.gov     © Copyright [employer] 2018 Information is for End User's use only and may not be sold, redistributed or otherwise used for commercial purposes. All illustrations and images included in CareNotes® are the copyrighted property of [employer] Inc[employer] or [employer]

## 2023-12-20 ENCOUNTER — APPOINTMENT (OUTPATIENT)
Dept: LAB | Facility: CLINIC | Age: 78
End: 2023-12-20
Payer: MEDICARE

## 2023-12-20 ENCOUNTER — HOSPITAL ENCOUNTER (OUTPATIENT)
Dept: RADIOLOGY | Age: 78
Discharge: HOME | End: 2023-12-20
Payer: MEDICARE

## 2023-12-20 DIAGNOSIS — E04.1 THYROID NODULE: ICD-10-CM

## 2023-12-20 DIAGNOSIS — J31.0 CHRONIC RHINITIS: ICD-10-CM

## 2023-12-20 DIAGNOSIS — R16.0 HEPATOMEGALY: ICD-10-CM

## 2023-12-20 PROCEDURE — 86003 ALLG SPEC IGE CRUDE XTRC EA: CPT

## 2023-12-20 PROCEDURE — 76981 USE PARENCHYMA: CPT

## 2023-12-20 PROCEDURE — 76536 US EXAM OF HEAD AND NECK: CPT

## 2023-12-20 PROCEDURE — 36415 COLL VENOUS BLD VENIPUNCTURE: CPT

## 2023-12-20 PROCEDURE — 82785 ASSAY OF IGE: CPT

## 2023-12-21 ENCOUNTER — REMOTE DEVICE CLINIC VISIT (OUTPATIENT)
Dept: CARDIOLOGY CLINIC | Facility: CLINIC | Age: 78
End: 2023-12-21
Payer: MEDICARE

## 2023-12-21 DIAGNOSIS — Z95.0 CARDIAC PACEMAKER IN SITU: Primary | ICD-10-CM

## 2023-12-21 PROCEDURE — 93294 REM INTERROG EVL PM/LDLS PM: CPT | Performed by: INTERNAL MEDICINE

## 2023-12-21 PROCEDURE — 93296 REM INTERROG EVL PM/IDS: CPT | Performed by: INTERNAL MEDICINE

## 2023-12-21 NOTE — PROGRESS NOTES
"Results for orders placed or performed in visit on 12/21/23   Cardiac EP device report    Narrative    MDT-DUAL CHAMBER PPM (AAIR-DDDR MODE)/ ACTIVE SYSTEM IS MRI CONDITIONAL  CARELINK TRANSMISSION: BATTERY STATUS \"11 YRS.\" AP 96%  0%. ALL AVAILABLE LEAD PARAMETERS WITHIN NORMAL LIMITS. NO SIGNIFICANT HIGH RATE EPISODES. NORMAL DEVICE FUNCTION. NC         "

## 2023-12-23 LAB

## 2023-12-26 ENCOUNTER — TELEPHONE (OUTPATIENT)
Age: 78
End: 2023-12-26

## 2023-12-26 NOTE — TELEPHONE ENCOUNTER
Patient called to state that she's received her allergy test results that were normal. Patient would like to know the nest step, as she still has a terrible cough.Please advise Patient.

## 2023-12-27 NOTE — TELEPHONE ENCOUNTER
Pt called again for her allergy testing results. Providers result note given.    Pt wants to know what she can do about this chronic cough that she has had for a year.     Pt also asked for her two US results. Advised that we do not have all of the results yet.     Please advise.

## 2023-12-29 ENCOUNTER — TELEPHONE (OUTPATIENT)
Dept: CARDIOLOGY CLINIC | Facility: CLINIC | Age: 78
End: 2023-12-29

## 2023-12-29 NOTE — TELEPHONE ENCOUNTER
Hi Dr Guerra,  There is no avaible appts can I double book or recommendations I can probable get her in the following week. Would that be ok  But in the meantime     Please advise

## 2023-12-29 NOTE — TELEPHONE ENCOUNTER
Coral her daughter called in also sent  a message through [employer].     Persistent cough for the last few months.   Had been tested for allergies,asthma and testing was all negative.   Denies any phlegm. Weight is stable no change holding around 155 lbs. Per daughter.   Denies any edema in lower legs/feet bilaterally, no abdomin bloating noted  Does have SOB on exertion.    Has furosemide as prn if needed for sob  Or weight gain, has not had to use.   Occasionally has a flutter.     BP's -140's SBP 80-90  Metoprolol succinate 100 mg q12 hrs  Diltiazem 120 mg bid  Eliquis 5 mg bid   Flecainide 100 mg bid   Lasix 20 mg as needed (not not been taking)  Zetia 10 mg qd  Losartan 50 mg bid    Please advise    CC off

## 2024-01-01 ENCOUNTER — APPOINTMENT (INPATIENT)
Dept: RADIOLOGY | Facility: HOSPITAL | Age: 79
DRG: 291 | End: 2024-01-01
Payer: MEDICARE

## 2024-01-01 ENCOUNTER — TELEPHONE (OUTPATIENT)
Dept: INTERVENTIONAL RADIOLOGY/VASCULAR | Facility: CLINIC | Age: 79
End: 2024-01-01

## 2024-01-01 ENCOUNTER — APPOINTMENT (EMERGENCY)
Dept: CT IMAGING | Facility: HOSPITAL | Age: 79
DRG: 291 | End: 2024-01-01
Payer: MEDICARE

## 2024-01-01 ENCOUNTER — HOSPITAL ENCOUNTER (INPATIENT)
Facility: HOSPITAL | Age: 79
LOS: 4 days | Discharge: NURSING, REHAB OR LONG-TERM CARE | DRG: 291 | End: 2024-10-05
Attending: EMERGENCY MEDICINE | Admitting: INTERNAL MEDICINE
Payer: MEDICARE

## 2024-01-01 ENCOUNTER — TELEPHONE (OUTPATIENT)
Dept: OTHER | Facility: OTHER | Age: 79
End: 2024-01-01

## 2024-01-01 ENCOUNTER — APPOINTMENT (EMERGENCY)
Dept: RADIOLOGY | Facility: HOSPITAL | Age: 79
DRG: 291 | End: 2024-01-01
Payer: MEDICARE

## 2024-01-01 VITALS
RESPIRATION RATE: 17 BRPM | HEART RATE: 60 BPM | TEMPERATURE: 98 F | WEIGHT: 162.48 LBS | DIASTOLIC BLOOD PRESSURE: 57 MMHG | BODY MASS INDEX: 29.72 KG/M2 | SYSTOLIC BLOOD PRESSURE: 113 MMHG | OXYGEN SATURATION: 96 %

## 2024-01-01 DIAGNOSIS — I50.9 CHF (CONGESTIVE HEART FAILURE) (HCC): ICD-10-CM

## 2024-01-01 DIAGNOSIS — J90 PLEURAL EFFUSION: ICD-10-CM

## 2024-01-01 DIAGNOSIS — N17.9 AKI (ACUTE KIDNEY INJURY) (HCC): ICD-10-CM

## 2024-01-01 DIAGNOSIS — J96.01 ACUTE RESPIRATORY FAILURE WITH HYPOXIA (HCC): Primary | ICD-10-CM

## 2024-01-01 DIAGNOSIS — I50.32 CHRONIC HEART FAILURE WITH PRESERVED EJECTION FRACTION (HCC): Chronic | ICD-10-CM

## 2024-01-01 DIAGNOSIS — E87.20 LACTIC ACIDOSIS: ICD-10-CM

## 2024-01-01 DIAGNOSIS — I50.9 ACUTE EXACERBATION OF CHF (CONGESTIVE HEART FAILURE) (HCC): ICD-10-CM

## 2024-01-01 DIAGNOSIS — E87.1 HYPONATREMIA: ICD-10-CM

## 2024-01-01 DIAGNOSIS — E87.5 HYPERKALEMIA: ICD-10-CM

## 2024-01-01 LAB
2HR DELTA HS TROPONIN: 1 NG/L
4HR DELTA HS TROPONIN: -1 NG/L
ALBUMIN SERPL BCG-MCNC: 3.2 G/DL (ref 3.5–5)
ALBUMIN SERPL BCG-MCNC: 3.3 G/DL (ref 3.5–5)
ALBUMIN SERPL BCG-MCNC: 3.6 G/DL (ref 3.5–5)
ALP SERPL-CCNC: 82 U/L (ref 34–104)
ALP SERPL-CCNC: 85 U/L (ref 34–104)
ALP SERPL-CCNC: 96 U/L (ref 34–104)
ALT SERPL W P-5'-P-CCNC: 40 U/L (ref 7–52)
ALT SERPL W P-5'-P-CCNC: 53 U/L (ref 7–52)
ALT SERPL W P-5'-P-CCNC: 62 U/L (ref 7–52)
ANION GAP SERPL CALCULATED.3IONS-SCNC: 5 MMOL/L (ref 4–13)
ANION GAP SERPL CALCULATED.3IONS-SCNC: 7 MMOL/L (ref 4–13)
ANION GAP SERPL CALCULATED.3IONS-SCNC: 8 MMOL/L (ref 4–13)
ANION GAP SERPL CALCULATED.3IONS-SCNC: 8 MMOL/L (ref 4–13)
APTT PPP: 28 SECONDS (ref 23–34)
AST SERPL W P-5'-P-CCNC: 45 U/L (ref 13–39)
AST SERPL W P-5'-P-CCNC: 59 U/L (ref 13–39)
AST SERPL W P-5'-P-CCNC: 62 U/L (ref 13–39)
ATRIAL RATE: 416 BPM
ATRIAL RATE: 62 BPM
ATRIAL RATE: 78 BPM
BACTERIA BLD CULT: NORMAL
BACTERIA BLD CULT: NORMAL
BASE EX.OXY STD BLDV CALC-SCNC: 52.5 % (ref 60–80)
BASE EXCESS BLDA CALC-SCNC: -4 MMOL/L (ref -2–3)
BASE EXCESS BLDV CALC-SCNC: -5.5 MMOL/L
BASOPHILS # BLD AUTO: 0.04 THOUSANDS/ΜL (ref 0–0.1)
BASOPHILS # BLD AUTO: 0.04 THOUSANDS/ΜL (ref 0–0.1)
BASOPHILS # BLD AUTO: 0.05 THOUSANDS/ΜL (ref 0–0.1)
BASOPHILS # BLD AUTO: 0.05 THOUSANDS/ΜL (ref 0–0.1)
BASOPHILS NFR BLD AUTO: 1 % (ref 0–1)
BILIRUB SERPL-MCNC: 0.85 MG/DL (ref 0.2–1)
BILIRUB SERPL-MCNC: 0.91 MG/DL (ref 0.2–1)
BILIRUB SERPL-MCNC: 1.13 MG/DL (ref 0.2–1)
BILIRUB UR QL STRIP: NEGATIVE
BNP SERPL-MCNC: 1039 PG/ML (ref 0–100)
BUN SERPL-MCNC: 34 MG/DL (ref 5–25)
BUN SERPL-MCNC: 38 MG/DL (ref 5–25)
BUN SERPL-MCNC: 49 MG/DL (ref 5–25)
BUN SERPL-MCNC: 50 MG/DL (ref 5–25)
CA-I BLD-SCNC: 1.06 MMOL/L (ref 1.12–1.32)
CALCIUM ALBUM COR SERPL-MCNC: 9.4 MG/DL (ref 8.3–10.1)
CALCIUM ALBUM COR SERPL-MCNC: 9.5 MG/DL (ref 8.3–10.1)
CALCIUM SERPL-MCNC: 8.5 MG/DL (ref 8.4–10.2)
CALCIUM SERPL-MCNC: 8.8 MG/DL (ref 8.4–10.2)
CALCIUM SERPL-MCNC: 8.9 MG/DL (ref 8.4–10.2)
CALCIUM SERPL-MCNC: 9.1 MG/DL (ref 8.4–10.2)
CARDIAC TROPONIN I PNL SERPL HS: 3 NG/L
CARDIAC TROPONIN I PNL SERPL HS: 4 NG/L
CARDIAC TROPONIN I PNL SERPL HS: 5 NG/L
CHLORIDE SERPL-SCNC: 97 MMOL/L (ref 96–108)
CHLORIDE SERPL-SCNC: 98 MMOL/L (ref 96–108)
CHLORIDE SERPL-SCNC: 99 MMOL/L (ref 96–108)
CHLORIDE SERPL-SCNC: 99 MMOL/L (ref 96–108)
CLARITY UR: CLEAR
CO2 SERPL-SCNC: 22 MMOL/L (ref 21–32)
CO2 SERPL-SCNC: 23 MMOL/L (ref 21–32)
CO2 SERPL-SCNC: 27 MMOL/L (ref 21–32)
CO2 SERPL-SCNC: 28 MMOL/L (ref 21–32)
COLOR UR: NORMAL
CREAT SERPL-MCNC: 1.03 MG/DL (ref 0.6–1.3)
CREAT SERPL-MCNC: 1.23 MG/DL (ref 0.6–1.3)
CREAT SERPL-MCNC: 1.85 MG/DL (ref 0.6–1.3)
CREAT SERPL-MCNC: 1.89 MG/DL (ref 0.6–1.3)
EOSINOPHIL # BLD AUTO: 0.16 THOUSAND/ΜL (ref 0–0.61)
EOSINOPHIL # BLD AUTO: 0.22 THOUSAND/ΜL (ref 0–0.61)
EOSINOPHIL # BLD AUTO: 0.27 THOUSAND/ΜL (ref 0–0.61)
EOSINOPHIL # BLD AUTO: 0.29 THOUSAND/ΜL (ref 0–0.61)
EOSINOPHIL NFR BLD AUTO: 2 % (ref 0–6)
EOSINOPHIL NFR BLD AUTO: 3 % (ref 0–6)
EOSINOPHIL NFR BLD AUTO: 4 % (ref 0–6)
EOSINOPHIL NFR BLD AUTO: 4 % (ref 0–6)
ERYTHROCYTE [DISTWIDTH] IN BLOOD BY AUTOMATED COUNT: 18.5 % (ref 11.6–15.1)
ERYTHROCYTE [DISTWIDTH] IN BLOOD BY AUTOMATED COUNT: 18.5 % (ref 11.6–15.1)
ERYTHROCYTE [DISTWIDTH] IN BLOOD BY AUTOMATED COUNT: 18.8 % (ref 11.6–15.1)
ERYTHROCYTE [DISTWIDTH] IN BLOOD BY AUTOMATED COUNT: 19.3 % (ref 11.6–15.1)
GFR SERPL CREATININE-BSD FRML MDRD: 24 ML/MIN/1.73SQ M
GFR SERPL CREATININE-BSD FRML MDRD: 25 ML/MIN/1.73SQ M
GFR SERPL CREATININE-BSD FRML MDRD: 41 ML/MIN/1.73SQ M
GFR SERPL CREATININE-BSD FRML MDRD: 51 ML/MIN/1.73SQ M
GLUCOSE SERPL-MCNC: 103 MG/DL (ref 65–140)
GLUCOSE SERPL-MCNC: 108 MG/DL (ref 65–140)
GLUCOSE SERPL-MCNC: 109 MG/DL (ref 65–140)
GLUCOSE SERPL-MCNC: 136 MG/DL (ref 65–140)
GLUCOSE SERPL-MCNC: 139 MG/DL (ref 65–140)
GLUCOSE SERPL-MCNC: 140 MG/DL (ref 65–140)
GLUCOSE SERPL-MCNC: 142 MG/DL (ref 65–140)
GLUCOSE SERPL-MCNC: 143 MG/DL (ref 65–140)
GLUCOSE SERPL-MCNC: 152 MG/DL (ref 65–140)
GLUCOSE SERPL-MCNC: 155 MG/DL (ref 65–140)
GLUCOSE SERPL-MCNC: 159 MG/DL (ref 65–140)
GLUCOSE SERPL-MCNC: 172 MG/DL (ref 65–140)
GLUCOSE SERPL-MCNC: 176 MG/DL (ref 65–140)
GLUCOSE SERPL-MCNC: 176 MG/DL (ref 65–140)
GLUCOSE SERPL-MCNC: 177 MG/DL (ref 65–140)
GLUCOSE SERPL-MCNC: 177 MG/DL (ref 65–140)
GLUCOSE SERPL-MCNC: 197 MG/DL (ref 65–140)
GLUCOSE SERPL-MCNC: 214 MG/DL (ref 65–140)
GLUCOSE SERPL-MCNC: 79 MG/DL (ref 65–140)
GLUCOSE SERPL-MCNC: 81 MG/DL (ref 65–140)
GLUCOSE SERPL-MCNC: 84 MG/DL (ref 65–140)
GLUCOSE UR STRIP-MCNC: NEGATIVE MG/DL
HCO3 BLDA-SCNC: 21 MMOL/L (ref 24–30)
HCO3 BLDV-SCNC: 20.5 MMOL/L (ref 24–30)
HCT VFR BLD AUTO: 33.1 % (ref 34.8–46.1)
HCT VFR BLD AUTO: 33.1 % (ref 34.8–46.1)
HCT VFR BLD AUTO: 33.8 % (ref 34.8–46.1)
HCT VFR BLD AUTO: 38.8 % (ref 34.8–46.1)
HCT VFR BLD CALC: 42 % (ref 34.8–46.1)
HGB BLD-MCNC: 10.4 G/DL (ref 11.5–15.4)
HGB BLD-MCNC: 10.5 G/DL (ref 11.5–15.4)
HGB BLD-MCNC: 10.7 G/DL (ref 11.5–15.4)
HGB BLD-MCNC: 12.1 G/DL (ref 11.5–15.4)
HGB BLDA-MCNC: 14.3 G/DL (ref 11.5–15.4)
HGB UR QL STRIP.AUTO: NEGATIVE
IMM GRANULOCYTES # BLD AUTO: 0.01 THOUSAND/UL (ref 0–0.2)
IMM GRANULOCYTES # BLD AUTO: 0.03 THOUSAND/UL (ref 0–0.2)
IMM GRANULOCYTES # BLD AUTO: 0.03 THOUSAND/UL (ref 0–0.2)
IMM GRANULOCYTES # BLD AUTO: 0.04 THOUSAND/UL (ref 0–0.2)
IMM GRANULOCYTES NFR BLD AUTO: 0 % (ref 0–2)
IMM GRANULOCYTES NFR BLD AUTO: 1 % (ref 0–2)
INR PPP: 1.75 (ref 0.85–1.19)
INR PPP: 1.94 (ref 0.85–1.19)
KETONES UR STRIP-MCNC: NEGATIVE MG/DL
LACTATE SERPL-SCNC: 1.7 MMOL/L (ref 0.5–2)
LACTATE SERPL-SCNC: 2.2 MMOL/L (ref 0.5–2)
LEUKOCYTE ESTERASE UR QL STRIP: NEGATIVE
LYMPHOCYTES # BLD AUTO: 1.18 THOUSANDS/ΜL (ref 0.6–4.47)
LYMPHOCYTES # BLD AUTO: 1.49 THOUSANDS/ΜL (ref 0.6–4.47)
LYMPHOCYTES # BLD AUTO: 1.79 THOUSANDS/ΜL (ref 0.6–4.47)
LYMPHOCYTES # BLD AUTO: 1.89 THOUSANDS/ΜL (ref 0.6–4.47)
LYMPHOCYTES NFR BLD AUTO: 16 % (ref 14–44)
LYMPHOCYTES NFR BLD AUTO: 21 % (ref 14–44)
LYMPHOCYTES NFR BLD AUTO: 22 % (ref 14–44)
LYMPHOCYTES NFR BLD AUTO: 27 % (ref 14–44)
MAGNESIUM SERPL-MCNC: 2.1 MG/DL (ref 1.9–2.7)
MAGNESIUM SERPL-MCNC: 2.3 MG/DL (ref 1.9–2.7)
MCH RBC QN AUTO: 26.3 PG (ref 26.8–34.3)
MCH RBC QN AUTO: 26.4 PG (ref 26.8–34.3)
MCH RBC QN AUTO: 26.9 PG (ref 26.8–34.3)
MCH RBC QN AUTO: 26.9 PG (ref 26.8–34.3)
MCHC RBC AUTO-ENTMCNC: 31.2 G/DL (ref 31.4–37.4)
MCHC RBC AUTO-ENTMCNC: 31.4 G/DL (ref 31.4–37.4)
MCHC RBC AUTO-ENTMCNC: 31.7 G/DL (ref 31.4–37.4)
MCHC RBC AUTO-ENTMCNC: 31.7 G/DL (ref 31.4–37.4)
MCV RBC AUTO: 83 FL (ref 82–98)
MCV RBC AUTO: 84 FL (ref 82–98)
MCV RBC AUTO: 85 FL (ref 82–98)
MCV RBC AUTO: 86 FL (ref 82–98)
MONOCYTES # BLD AUTO: 0.86 THOUSAND/ΜL (ref 0.17–1.22)
MONOCYTES # BLD AUTO: 0.9 THOUSAND/ΜL (ref 0.17–1.22)
MONOCYTES # BLD AUTO: 0.91 THOUSAND/ΜL (ref 0.17–1.22)
MONOCYTES # BLD AUTO: 1.07 THOUSAND/ΜL (ref 0.17–1.22)
MONOCYTES NFR BLD AUTO: 12 % (ref 4–12)
MONOCYTES NFR BLD AUTO: 12 % (ref 4–12)
MONOCYTES NFR BLD AUTO: 13 % (ref 4–12)
MONOCYTES NFR BLD AUTO: 13 % (ref 4–12)
MRSA NOSE QL CULT: NORMAL
NEUTROPHILS # BLD AUTO: 3.76 THOUSANDS/ΜL (ref 1.85–7.62)
NEUTROPHILS # BLD AUTO: 4.3 THOUSANDS/ΜL (ref 1.85–7.62)
NEUTROPHILS # BLD AUTO: 5.15 THOUSANDS/ΜL (ref 1.85–7.62)
NEUTROPHILS # BLD AUTO: 5.22 THOUSANDS/ΜL (ref 1.85–7.62)
NEUTS SEG NFR BLD AUTO: 55 % (ref 43–75)
NEUTS SEG NFR BLD AUTO: 61 % (ref 43–75)
NEUTS SEG NFR BLD AUTO: 62 % (ref 43–75)
NEUTS SEG NFR BLD AUTO: 68 % (ref 43–75)
NITRITE UR QL STRIP: NEGATIVE
NRBC BLD AUTO-RTO: 0 /100 WBCS
O2 CT BLDV-SCNC: 9.8 ML/DL
P AXIS: -37 DEGREES
P AXIS: 101 DEGREES
P AXIS: 166 DEGREES
PCO2 BLD: 22 MMOL/L (ref 21–32)
PCO2 BLD: 35.4 MM HG (ref 42–50)
PCO2 BLDV: 41.9 MM HG (ref 42–50)
PH BLD: 7.38 [PH] (ref 7.3–7.4)
PH BLDV: 7.31 [PH] (ref 7.3–7.4)
PH UR STRIP.AUTO: 5 [PH]
PHOSPHATE SERPL-MCNC: 4.2 MG/DL (ref 2.3–4.1)
PHOSPHATE SERPL-MCNC: 5.6 MG/DL (ref 2.3–4.1)
PHOSPHATE SERPL-MCNC: 6.2 MG/DL (ref 2.3–4.1)
PLATELET # BLD AUTO: 237 THOUSANDS/UL (ref 149–390)
PLATELET # BLD AUTO: 240 THOUSANDS/UL (ref 149–390)
PLATELET # BLD AUTO: 267 THOUSANDS/UL (ref 149–390)
PLATELET # BLD AUTO: 294 THOUSANDS/UL (ref 149–390)
PMV BLD AUTO: 10 FL (ref 8.9–12.7)
PMV BLD AUTO: 10.3 FL (ref 8.9–12.7)
PMV BLD AUTO: 10.5 FL (ref 8.9–12.7)
PMV BLD AUTO: 9.9 FL (ref 8.9–12.7)
PO2 BLD: 22 MM HG (ref 35–45)
PO2 BLDV: 32.9 MM HG (ref 35–45)
POTASSIUM BLD-SCNC: 6.3 MMOL/L (ref 3.5–5.3)
POTASSIUM SERPL-SCNC: 3.7 MMOL/L (ref 3.5–5.3)
POTASSIUM SERPL-SCNC: 4.2 MMOL/L (ref 3.5–5.3)
POTASSIUM SERPL-SCNC: 4.5 MMOL/L (ref 3.5–5.3)
POTASSIUM SERPL-SCNC: 4.6 MMOL/L (ref 3.5–5.3)
POTASSIUM SERPL-SCNC: 4.7 MMOL/L (ref 3.5–5.3)
POTASSIUM SERPL-SCNC: 5.7 MMOL/L (ref 3.5–5.3)
PR INTERVAL: 176 MS
PR INTERVAL: 414 MS
PROCALCITONIN SERPL-MCNC: 0.14 NG/ML
PROT SERPL-MCNC: 5.8 G/DL (ref 6.4–8.4)
PROT SERPL-MCNC: 6.2 G/DL (ref 6.4–8.4)
PROT SERPL-MCNC: 6.5 G/DL (ref 6.4–8.4)
PROT UR STRIP-MCNC: NEGATIVE MG/DL
PROTHROMBIN TIME: 21.2 SECONDS (ref 12.3–15)
PROTHROMBIN TIME: 22.8 SECONDS (ref 12.3–15)
QRS AXIS: 130 DEGREES
QRS AXIS: 142 DEGREES
QRS AXIS: 233 DEGREES
QRSD INTERVAL: 122 MS
QRSD INTERVAL: 198 MS
QRSD INTERVAL: 44 MS
QT INTERVAL: 118 MS
QT INTERVAL: 300 MS
QT INTERVAL: 568 MS
QTC INTERVAL: 169 MS
QTC INTERVAL: 342 MS
QTC INTERVAL: 576 MS
RBC # BLD AUTO: 3.96 MILLION/UL (ref 3.81–5.12)
RBC # BLD AUTO: 3.97 MILLION/UL (ref 3.81–5.12)
RBC # BLD AUTO: 3.98 MILLION/UL (ref 3.81–5.12)
RBC # BLD AUTO: 4.5 MILLION/UL (ref 3.81–5.12)
SAO2 % BLD FROM PO2: 37 % (ref 60–85)
SARS-COV-2 RNA RESP QL NAA+PROBE: NEGATIVE
SODIUM BLD-SCNC: 126 MMOL/L (ref 136–145)
SODIUM SERPL-SCNC: 127 MMOL/L (ref 135–147)
SODIUM SERPL-SCNC: 128 MMOL/L (ref 135–147)
SODIUM SERPL-SCNC: 129 MMOL/L (ref 135–147)
SODIUM SERPL-SCNC: 131 MMOL/L (ref 135–147)
SODIUM SERPL-SCNC: 134 MMOL/L (ref 135–147)
SP GR UR STRIP.AUTO: 1.01 (ref 1–1.03)
SPECIMEN SOURCE: ABNORMAL
T WAVE AXIS: 0 DEGREES
T WAVE AXIS: 113 DEGREES
T WAVE AXIS: 261 DEGREES
UROBILINOGEN UR STRIP-ACNC: <2 MG/DL
VENTRICULAR RATE: 124 BPM
VENTRICULAR RATE: 62 BPM
VENTRICULAR RATE: 78 BPM
WBC # BLD AUTO: 6.9 THOUSAND/UL (ref 4.31–10.16)
WBC # BLD AUTO: 6.99 THOUSAND/UL (ref 4.31–10.16)
WBC # BLD AUTO: 7.53 THOUSAND/UL (ref 4.31–10.16)
WBC # BLD AUTO: 8.33 THOUSAND/UL (ref 4.31–10.16)

## 2024-01-01 PROCEDURE — 83735 ASSAY OF MAGNESIUM: CPT

## 2024-01-01 PROCEDURE — 94640 AIRWAY INHALATION TREATMENT: CPT

## 2024-01-01 PROCEDURE — 83605 ASSAY OF LACTIC ACID: CPT | Performed by: EMERGENCY MEDICINE

## 2024-01-01 PROCEDURE — 84132 ASSAY OF SERUM POTASSIUM: CPT

## 2024-01-01 PROCEDURE — 93010 ELECTROCARDIOGRAM REPORT: CPT | Performed by: INTERNAL MEDICINE

## 2024-01-01 PROCEDURE — 87040 BLOOD CULTURE FOR BACTERIA: CPT | Performed by: EMERGENCY MEDICINE

## 2024-01-01 PROCEDURE — 73110 X-RAY EXAM OF WRIST: CPT

## 2024-01-01 PROCEDURE — 73130 X-RAY EXAM OF HAND: CPT

## 2024-01-01 PROCEDURE — 93005 ELECTROCARDIOGRAM TRACING: CPT

## 2024-01-01 PROCEDURE — 83880 ASSAY OF NATRIURETIC PEPTIDE: CPT | Performed by: EMERGENCY MEDICINE

## 2024-01-01 PROCEDURE — 94644 CONT INHLJ TX 1ST HOUR: CPT

## 2024-01-01 PROCEDURE — 99232 SBSQ HOSP IP/OBS MODERATE 35: CPT | Performed by: HOSPITALIST

## 2024-01-01 PROCEDURE — 85025 COMPLETE CBC W/AUTO DIFF WBC: CPT | Performed by: EMERGENCY MEDICINE

## 2024-01-01 PROCEDURE — 97535 SELF CARE MNGMENT TRAINING: CPT

## 2024-01-01 PROCEDURE — 99291 CRITICAL CARE FIRST HOUR: CPT

## 2024-01-01 PROCEDURE — 71045 X-RAY EXAM CHEST 1 VIEW: CPT

## 2024-01-01 PROCEDURE — 85025 COMPLETE CBC W/AUTO DIFF WBC: CPT

## 2024-01-01 PROCEDURE — 94002 VENT MGMT INPAT INIT DAY: CPT

## 2024-01-01 PROCEDURE — 71046 X-RAY EXAM CHEST 2 VIEWS: CPT

## 2024-01-01 PROCEDURE — 73560 X-RAY EXAM OF KNEE 1 OR 2: CPT

## 2024-01-01 PROCEDURE — 87081 CULTURE SCREEN ONLY: CPT

## 2024-01-01 PROCEDURE — 82330 ASSAY OF CALCIUM: CPT

## 2024-01-01 PROCEDURE — 94760 N-INVAS EAR/PLS OXIMETRY 1: CPT

## 2024-01-01 PROCEDURE — 80053 COMPREHEN METABOLIC PANEL: CPT

## 2024-01-01 PROCEDURE — 81003 URINALYSIS AUTO W/O SCOPE: CPT | Performed by: EMERGENCY MEDICINE

## 2024-01-01 PROCEDURE — 82803 BLOOD GASES ANY COMBINATION: CPT

## 2024-01-01 PROCEDURE — 82948 REAGENT STRIP/BLOOD GLUCOSE: CPT

## 2024-01-01 PROCEDURE — 71250 CT THORAX DX C-: CPT

## 2024-01-01 PROCEDURE — 84295 ASSAY OF SERUM SODIUM: CPT

## 2024-01-01 PROCEDURE — 85014 HEMATOCRIT: CPT

## 2024-01-01 PROCEDURE — 82805 BLOOD GASES W/O2 SATURATION: CPT | Performed by: EMERGENCY MEDICINE

## 2024-01-01 PROCEDURE — 99239 HOSP IP/OBS DSCHRG MGMT >30: CPT | Performed by: HOSPITALIST

## 2024-01-01 PROCEDURE — 87635 SARS-COV-2 COVID-19 AMP PRB: CPT

## 2024-01-01 PROCEDURE — 80048 BASIC METABOLIC PNL TOTAL CA: CPT

## 2024-01-01 PROCEDURE — 85610 PROTHROMBIN TIME: CPT

## 2024-01-01 PROCEDURE — 96367 TX/PROPH/DG ADDL SEQ IV INF: CPT

## 2024-01-01 PROCEDURE — 82947 ASSAY GLUCOSE BLOOD QUANT: CPT

## 2024-01-01 PROCEDURE — 96365 THER/PROPH/DIAG IV INF INIT: CPT

## 2024-01-01 PROCEDURE — 80053 COMPREHEN METABOLIC PANEL: CPT | Performed by: EMERGENCY MEDICINE

## 2024-01-01 PROCEDURE — 36415 COLL VENOUS BLD VENIPUNCTURE: CPT | Performed by: EMERGENCY MEDICINE

## 2024-01-01 PROCEDURE — 99291 CRITICAL CARE FIRST HOUR: CPT | Performed by: EMERGENCY MEDICINE

## 2024-01-01 PROCEDURE — 99223 1ST HOSP IP/OBS HIGH 75: CPT | Performed by: HOSPITALIST

## 2024-01-01 PROCEDURE — 99222 1ST HOSP IP/OBS MODERATE 55: CPT | Performed by: STUDENT IN AN ORGANIZED HEALTH CARE EDUCATION/TRAINING PROGRAM

## 2024-01-01 PROCEDURE — 84100 ASSAY OF PHOSPHORUS: CPT

## 2024-01-01 PROCEDURE — 84145 PROCALCITONIN (PCT): CPT | Performed by: EMERGENCY MEDICINE

## 2024-01-01 PROCEDURE — 97163 PT EVAL HIGH COMPLEX 45 MIN: CPT

## 2024-01-01 PROCEDURE — 96375 TX/PRO/DX INJ NEW DRUG ADDON: CPT

## 2024-01-01 PROCEDURE — 97116 GAIT TRAINING THERAPY: CPT

## 2024-01-01 PROCEDURE — 97167 OT EVAL HIGH COMPLEX 60 MIN: CPT

## 2024-01-01 PROCEDURE — 85730 THROMBOPLASTIN TIME PARTIAL: CPT | Performed by: EMERGENCY MEDICINE

## 2024-01-01 PROCEDURE — 85610 PROTHROMBIN TIME: CPT | Performed by: EMERGENCY MEDICINE

## 2024-01-01 PROCEDURE — 84484 ASSAY OF TROPONIN QUANT: CPT | Performed by: EMERGENCY MEDICINE

## 2024-01-01 RX ORDER — ECHINACEA PURPUREA EXTRACT 125 MG
1 TABLET ORAL
Status: CANCELLED
Start: 2024-01-01

## 2024-01-01 RX ORDER — ASCORBIC ACID 500 MG
500 TABLET ORAL
Status: DISCONTINUED | OUTPATIENT
Start: 2024-01-01 | End: 2024-01-01 | Stop reason: HOSPADM

## 2024-01-01 RX ORDER — BUMETANIDE 0.25 MG/ML
2 INJECTION INTRAMUSCULAR; INTRAVENOUS
Status: DISCONTINUED | OUTPATIENT
Start: 2024-01-01 | End: 2024-01-01

## 2024-01-01 RX ORDER — LORATADINE 10 MG/1
5 TABLET ORAL DAILY
Status: DISCONTINUED | OUTPATIENT
Start: 2024-01-01 | End: 2024-01-01 | Stop reason: HOSPADM

## 2024-01-01 RX ORDER — ACETAMINOPHEN 325 MG/1
650 TABLET ORAL EVERY 8 HOURS PRN
Status: DISCONTINUED | OUTPATIENT
Start: 2024-01-01 | End: 2024-01-01

## 2024-01-01 RX ORDER — NIFEDIPINE 30 MG/1
30 TABLET, EXTENDED RELEASE ORAL DAILY
Status: DISCONTINUED | OUTPATIENT
Start: 2024-01-01 | End: 2024-01-01

## 2024-01-01 RX ORDER — FUROSEMIDE 10 MG/ML
40 INJECTION INTRAMUSCULAR; INTRAVENOUS ONCE
Status: COMPLETED | OUTPATIENT
Start: 2024-01-01 | End: 2024-01-01

## 2024-01-01 RX ORDER — ECHINACEA PURPUREA EXTRACT 125 MG
1 TABLET ORAL
Status: DISCONTINUED | OUTPATIENT
Start: 2024-01-01 | End: 2024-01-01 | Stop reason: HOSPADM

## 2024-01-01 RX ORDER — INSULIN LISPRO 100 [IU]/ML
1-6 INJECTION, SOLUTION INTRAVENOUS; SUBCUTANEOUS
Status: DISCONTINUED | OUTPATIENT
Start: 2024-01-01 | End: 2024-01-01 | Stop reason: HOSPADM

## 2024-01-01 RX ORDER — LEVALBUTEROL INHALATION SOLUTION 1.25 MG/3ML
1.25 SOLUTION RESPIRATORY (INHALATION)
Status: DISCONTINUED | OUTPATIENT
Start: 2024-01-01 | End: 2024-01-01 | Stop reason: HOSPADM

## 2024-01-01 RX ORDER — POLYETHYLENE GLYCOL 3350 17 G/17G
17 POWDER, FOR SOLUTION ORAL DAILY PRN
Status: DISCONTINUED | OUTPATIENT
Start: 2024-01-01 | End: 2024-01-01 | Stop reason: HOSPADM

## 2024-01-01 RX ORDER — DEXTROSE MONOHYDRATE 25 G/50ML
25 INJECTION, SOLUTION INTRAVENOUS ONCE
Status: COMPLETED | OUTPATIENT
Start: 2024-01-01 | End: 2024-01-01

## 2024-01-01 RX ORDER — CALCIUM GLUCONATE 20 MG/ML
1 INJECTION, SOLUTION INTRAVENOUS ONCE
Status: COMPLETED | OUTPATIENT
Start: 2024-01-01 | End: 2024-01-01

## 2024-01-01 RX ORDER — POLYETHYLENE GLYCOL 3350 17 G/17G
17 POWDER, FOR SOLUTION ORAL DAILY PRN
Status: CANCELLED
Start: 2024-01-01

## 2024-01-01 RX ORDER — ALBUTEROL SULFATE 0.83 MG/ML
2.5 SOLUTION RESPIRATORY (INHALATION) 4 TIMES DAILY
Status: DISCONTINUED | OUTPATIENT
Start: 2024-01-01 | End: 2024-01-01

## 2024-01-01 RX ORDER — ZOLPIDEM TARTRATE 5 MG/1
10 TABLET ORAL
Status: DISCONTINUED | OUTPATIENT
Start: 2024-01-01 | End: 2024-01-01 | Stop reason: HOSPADM

## 2024-01-01 RX ORDER — BUMETANIDE 0.25 MG/ML
3.25 INJECTION INTRAMUSCULAR; INTRAVENOUS
Status: DISCONTINUED | OUTPATIENT
Start: 2024-01-01 | End: 2024-01-01

## 2024-01-01 RX ORDER — BENZONATATE 100 MG/1
100 CAPSULE ORAL 3 TIMES DAILY PRN
Status: DISCONTINUED | OUTPATIENT
Start: 2024-01-01 | End: 2024-01-01 | Stop reason: HOSPADM

## 2024-01-01 RX ORDER — FAMOTIDINE 20 MG/1
20 TABLET, FILM COATED ORAL DAILY
Status: DISCONTINUED | OUTPATIENT
Start: 2024-01-01 | End: 2024-01-01 | Stop reason: HOSPADM

## 2024-01-01 RX ORDER — GUAIFENESIN 100 MG/5ML
100 SOLUTION ORAL 3 TIMES DAILY PRN
Status: DISCONTINUED | OUTPATIENT
Start: 2024-01-01 | End: 2024-01-01 | Stop reason: HOSPADM

## 2024-01-01 RX ORDER — GABAPENTIN 300 MG/1
300 CAPSULE ORAL
Status: DISCONTINUED | OUTPATIENT
Start: 2024-01-01 | End: 2024-01-01 | Stop reason: HOSPADM

## 2024-01-01 RX ORDER — SEVELAMER HYDROCHLORIDE 800 MG/1
800 TABLET, FILM COATED ORAL
Status: DISCONTINUED | OUTPATIENT
Start: 2024-01-01 | End: 2024-01-01 | Stop reason: HOSPADM

## 2024-01-01 RX ORDER — GUAIFENESIN 200 MG/10ML
100 LIQUID ORAL 3 TIMES DAILY PRN
COMMUNITY

## 2024-01-01 RX ORDER — ACETAMINOPHEN 325 MG/1
975 TABLET ORAL EVERY 8 HOURS SCHEDULED
Status: DISCONTINUED | OUTPATIENT
Start: 2024-01-01 | End: 2024-01-01 | Stop reason: HOSPADM

## 2024-01-01 RX ORDER — IPRATROPIUM BROMIDE 21 UG/1
2 SPRAY, METERED NASAL 3 TIMES DAILY
Status: DISCONTINUED | OUTPATIENT
Start: 2024-01-01 | End: 2024-01-01

## 2024-01-01 RX ORDER — ALBUTEROL SULFATE 5 MG/ML
10 SOLUTION RESPIRATORY (INHALATION) ONCE
Status: COMPLETED | OUTPATIENT
Start: 2024-01-01 | End: 2024-01-01

## 2024-01-01 RX ORDER — ONDANSETRON 2 MG/ML
4 INJECTION INTRAMUSCULAR; INTRAVENOUS EVERY 6 HOURS PRN
Status: DISCONTINUED | OUTPATIENT
Start: 2024-01-01 | End: 2024-01-01

## 2024-01-01 RX ORDER — FLECAINIDE ACETATE 50 MG/1
150 TABLET ORAL 2 TIMES DAILY
Status: DISCONTINUED | OUTPATIENT
Start: 2024-01-01 | End: 2024-01-01 | Stop reason: HOSPADM

## 2024-01-01 RX ORDER — OXYCODONE HYDROCHLORIDE 5 MG/1
2.5 CAPSULE ORAL EVERY 6 HOURS PRN
COMMUNITY
End: 2024-01-01

## 2024-01-01 RX ORDER — EZETIMIBE 10 MG/1
10 TABLET ORAL
Status: DISCONTINUED | OUTPATIENT
Start: 2024-01-01 | End: 2024-01-01 | Stop reason: HOSPADM

## 2024-01-01 RX ORDER — LANOLIN ALCOHOL/MO/W.PET/CERES
3 CREAM (GRAM) TOPICAL
Status: CANCELLED
Start: 2024-01-01

## 2024-01-01 RX ORDER — SEVELAMER HYDROCHLORIDE 800 MG/1
800 TABLET, FILM COATED ORAL
Status: DISCONTINUED | OUTPATIENT
Start: 2024-01-01 | End: 2024-01-01

## 2024-01-01 RX ORDER — BUMETANIDE 1 MG/1
3 TABLET ORAL DAILY
Status: DISCONTINUED | OUTPATIENT
Start: 2024-01-01 | End: 2024-01-01 | Stop reason: HOSPADM

## 2024-01-01 RX ORDER — ALBUTEROL SULFATE 90 UG/1
2 INHALANT RESPIRATORY (INHALATION) EVERY 6 HOURS PRN
Status: DISCONTINUED | OUTPATIENT
Start: 2024-01-01 | End: 2024-01-01 | Stop reason: HOSPADM

## 2024-01-01 RX ORDER — MAGNESIUM HYDROXIDE/ALUMINUM HYDROXICE/SIMETHICONE 120; 1200; 1200 MG/30ML; MG/30ML; MG/30ML
30 SUSPENSION ORAL EVERY 6 HOURS PRN
Status: CANCELLED
Start: 2024-01-01

## 2024-01-01 RX ORDER — ROPINIROLE 1 MG/1
2 TABLET, FILM COATED ORAL
Status: DISCONTINUED | OUTPATIENT
Start: 2024-01-01 | End: 2024-01-01 | Stop reason: HOSPADM

## 2024-01-01 RX ORDER — INSULIN GLARGINE 100 [IU]/ML
5 INJECTION, SOLUTION SUBCUTANEOUS
Status: DISCONTINUED | OUTPATIENT
Start: 2024-01-01 | End: 2024-01-01 | Stop reason: HOSPADM

## 2024-01-01 RX ORDER — MAGNESIUM HYDROXIDE/ALUMINUM HYDROXICE/SIMETHICONE 120; 1200; 1200 MG/30ML; MG/30ML; MG/30ML
30 SUSPENSION ORAL EVERY 6 HOURS PRN
Status: DISCONTINUED | OUTPATIENT
Start: 2024-01-01 | End: 2024-01-01 | Stop reason: HOSPADM

## 2024-01-01 RX ORDER — DILTIAZEM HYDROCHLORIDE 120 MG/1
120 CAPSULE, COATED, EXTENDED RELEASE ORAL DAILY
Status: DISCONTINUED | OUTPATIENT
Start: 2024-01-01 | End: 2024-01-01 | Stop reason: HOSPADM

## 2024-01-01 RX ORDER — BUMETANIDE 2 MG/1
3 TABLET ORAL DAILY
Start: 2024-01-01 | End: 2024-10-08

## 2024-01-01 RX ORDER — SEVELAMER CARBONATE 800 MG/1
800 TABLET, FILM COATED ORAL
Status: CANCELLED
Start: 2024-01-01

## 2024-01-01 RX ORDER — ACETAMINOPHEN 325 MG/1
975 TABLET ORAL EVERY 6 HOURS PRN
Status: CANCELLED
Start: 2024-01-01

## 2024-01-01 RX ORDER — LANOLIN ALCOHOL/MO/W.PET/CERES
3 CREAM (GRAM) TOPICAL
Status: DISCONTINUED | OUTPATIENT
Start: 2024-01-01 | End: 2024-01-01 | Stop reason: HOSPADM

## 2024-01-01 RX ORDER — LOPERAMIDE HCL 2 MG
2 CAPSULE ORAL ONCE
Status: COMPLETED | OUTPATIENT
Start: 2024-01-01 | End: 2024-01-01

## 2024-01-01 RX ADMIN — INSULIN LISPRO 2 UNITS: 100 INJECTION, SOLUTION INTRAVENOUS; SUBCUTANEOUS at 23:11

## 2024-01-01 RX ADMIN — SODIUM CHLORIDE 250 ML: 0.9 INJECTION, SOLUTION INTRAVENOUS at 17:34

## 2024-01-01 RX ADMIN — LEVALBUTEROL HYDROCHLORIDE 1.25 MG: 1.25 SOLUTION RESPIRATORY (INHALATION) at 19:22

## 2024-01-01 RX ADMIN — Medication 2000 UNITS: at 17:34

## 2024-01-01 RX ADMIN — GUAIFENESIN 100 MG: 200 SOLUTION ORAL at 11:51

## 2024-01-01 RX ADMIN — METOPROLOL SUCCINATE 75 MG: 50 TABLET, EXTENDED RELEASE ORAL at 08:07

## 2024-01-01 RX ADMIN — FAMOTIDINE 20 MG: 20 TABLET ORAL at 09:35

## 2024-01-01 RX ADMIN — UMECLIDINIUM 1 PUFF: 62.5 AEROSOL, POWDER ORAL at 09:44

## 2024-01-01 RX ADMIN — METOPROLOL SUCCINATE 75 MG: 50 TABLET, EXTENDED RELEASE ORAL at 21:00

## 2024-01-01 RX ADMIN — FLECAINIDE ACETATE 150 MG: 50 TABLET ORAL at 08:35

## 2024-01-01 RX ADMIN — ROPINIROLE HYDROCHLORIDE 2 MG: 1 TABLET, FILM COATED ORAL at 21:01

## 2024-01-01 RX ADMIN — DILTIAZEM HYDROCHLORIDE 120 MG: 120 CAPSULE, COATED, EXTENDED RELEASE ORAL at 09:35

## 2024-01-01 RX ADMIN — OXYCODONE HYDROCHLORIDE AND ACETAMINOPHEN 500 MG: 500 TABLET ORAL at 21:34

## 2024-01-01 RX ADMIN — SEVELAMER HYDROCHLORIDE 800 MG: 800 TABLET ORAL at 00:07

## 2024-01-01 RX ADMIN — INSULIN LISPRO 1 UNITS: 100 INJECTION, SOLUTION INTRAVENOUS; SUBCUTANEOUS at 12:32

## 2024-01-01 RX ADMIN — METOPROLOL SUCCINATE 75 MG: 50 TABLET, EXTENDED RELEASE ORAL at 21:24

## 2024-01-01 RX ADMIN — FLECAINIDE ACETATE 150 MG: 50 TABLET ORAL at 00:07

## 2024-01-01 RX ADMIN — ROPINIROLE HYDROCHLORIDE 2 MG: 1 TABLET, FILM COATED ORAL at 00:07

## 2024-01-01 RX ADMIN — Medication 2000 UNITS: at 08:35

## 2024-01-01 RX ADMIN — LORATADINE 5 MG: 10 TABLET ORAL at 08:56

## 2024-01-01 RX ADMIN — FLECAINIDE ACETATE 150 MG: 50 TABLET ORAL at 17:48

## 2024-01-01 RX ADMIN — UMECLIDINIUM 1 PUFF: 62.5 AEROSOL, POWDER ORAL at 08:37

## 2024-01-01 RX ADMIN — CALCIUM GLUCONATE 1 G: 20 INJECTION, SOLUTION INTRAVENOUS at 22:35

## 2024-01-01 RX ADMIN — LEVALBUTEROL HYDROCHLORIDE 1.25 MG: 1.25 SOLUTION RESPIRATORY (INHALATION) at 19:38

## 2024-01-01 RX ADMIN — ZOLPIDEM TARTRATE 10 MG: 5 TABLET, COATED ORAL at 21:23

## 2024-01-01 RX ADMIN — UMECLIDINIUM 1 PUFF: 62.5 AEROSOL, POWDER ORAL at 08:58

## 2024-01-01 RX ADMIN — BENZONATATE 100 MG: 100 CAPSULE ORAL at 14:26

## 2024-01-01 RX ADMIN — Medication 2000 UNITS: at 09:35

## 2024-01-01 RX ADMIN — LEVALBUTEROL HYDROCHLORIDE 1.25 MG: 1.25 SOLUTION RESPIRATORY (INHALATION) at 13:59

## 2024-01-01 RX ADMIN — FAMOTIDINE 20 MG: 20 TABLET ORAL at 08:07

## 2024-01-01 RX ADMIN — ZOLPIDEM TARTRATE 10 MG: 5 TABLET, COATED ORAL at 21:00

## 2024-01-01 RX ADMIN — BENZONATATE 100 MG: 100 CAPSULE ORAL at 09:35

## 2024-01-01 RX ADMIN — GABAPENTIN 300 MG: 300 CAPSULE ORAL at 21:24

## 2024-01-01 RX ADMIN — INSULIN LISPRO 1 UNITS: 100 INJECTION, SOLUTION INTRAVENOUS; SUBCUTANEOUS at 11:51

## 2024-01-01 RX ADMIN — APIXABAN 2.5 MG: 2.5 TABLET, FILM COATED ORAL at 08:57

## 2024-01-01 RX ADMIN — APIXABAN 2.5 MG: 2.5 TABLET, FILM COATED ORAL at 00:08

## 2024-01-01 RX ADMIN — FLECAINIDE ACETATE 150 MG: 50 TABLET ORAL at 17:02

## 2024-01-01 RX ADMIN — APIXABAN 2.5 MG: 2.5 TABLET, FILM COATED ORAL at 08:07

## 2024-01-01 RX ADMIN — CEFEPIME 2000 MG: 2 INJECTION, POWDER, FOR SOLUTION INTRAVENOUS at 17:34

## 2024-01-01 RX ADMIN — BENZONATATE 100 MG: 100 CAPSULE ORAL at 15:00

## 2024-01-01 RX ADMIN — GUAIFENESIN 100 MG: 200 SOLUTION ORAL at 21:05

## 2024-01-01 RX ADMIN — Medication 2000 UNITS: at 08:07

## 2024-01-01 RX ADMIN — BUMETANIDE 3.25 MG: 0.25 INJECTION INTRAMUSCULAR; INTRAVENOUS at 16:54

## 2024-01-01 RX ADMIN — INSULIN GLARGINE 5 UNITS: 100 INJECTION, SOLUTION SUBCUTANEOUS at 21:24

## 2024-01-01 RX ADMIN — LOPERAMIDE HYDROCHLORIDE 2 MG: 2 CAPSULE ORAL at 21:23

## 2024-01-01 RX ADMIN — Medication 3 MG: at 21:34

## 2024-01-01 RX ADMIN — ZOLPIDEM TARTRATE 10 MG: 5 TABLET, COATED ORAL at 21:34

## 2024-01-01 RX ADMIN — LEVALBUTEROL HYDROCHLORIDE 1.25 MG: 1.25 SOLUTION RESPIRATORY (INHALATION) at 07:27

## 2024-01-01 RX ADMIN — Medication 2000 UNITS: at 08:56

## 2024-01-01 RX ADMIN — LORATADINE 5 MG: 10 TABLET ORAL at 08:07

## 2024-01-01 RX ADMIN — BUMETANIDE 3.25 MG: 0.25 INJECTION INTRAMUSCULAR; INTRAVENOUS at 08:06

## 2024-01-01 RX ADMIN — INSULIN GLARGINE 5 UNITS: 100 INJECTION, SOLUTION SUBCUTANEOUS at 21:34

## 2024-01-01 RX ADMIN — ALBUTEROL SULFATE 10 MG: 2.5 SOLUTION RESPIRATORY (INHALATION) at 22:36

## 2024-01-01 RX ADMIN — DEXTROSE MONOHYDRATE 25 ML: 25 INJECTION, SOLUTION INTRAVENOUS at 22:56

## 2024-01-01 RX ADMIN — BENZONATATE 100 MG: 100 CAPSULE ORAL at 11:50

## 2024-01-01 RX ADMIN — SEVELAMER HYDROCHLORIDE 800 MG: 800 TABLET ORAL at 11:51

## 2024-01-01 RX ADMIN — INSULIN LISPRO 1 UNITS: 100 INJECTION, SOLUTION INTRAVENOUS; SUBCUTANEOUS at 17:48

## 2024-01-01 RX ADMIN — BENZONATATE 100 MG: 100 CAPSULE ORAL at 13:55

## 2024-01-01 RX ADMIN — INSULIN LISPRO 1 UNITS: 100 INJECTION, SOLUTION INTRAVENOUS; SUBCUTANEOUS at 12:42

## 2024-01-01 RX ADMIN — BUMETANIDE 3.25 MG: 0.25 INJECTION INTRAMUSCULAR; INTRAVENOUS at 16:31

## 2024-01-01 RX ADMIN — ROPINIROLE HYDROCHLORIDE 2 MG: 1 TABLET, FILM COATED ORAL at 21:34

## 2024-01-01 RX ADMIN — GUAIFENESIN 100 MG: 200 SOLUTION ORAL at 09:36

## 2024-01-01 RX ADMIN — EZETIMIBE 10 MG: 10 TABLET ORAL at 00:08

## 2024-01-01 RX ADMIN — FLECAINIDE ACETATE 150 MG: 50 TABLET ORAL at 08:53

## 2024-01-01 RX ADMIN — EZETIMIBE 10 MG: 10 TABLET ORAL at 21:34

## 2024-01-01 RX ADMIN — SEVELAMER HYDROCHLORIDE 800 MG: 800 TABLET ORAL at 08:06

## 2024-01-01 RX ADMIN — ACETAMINOPHEN 975 MG: 325 TABLET ORAL at 13:55

## 2024-01-01 RX ADMIN — UMECLIDINIUM 1 PUFF: 62.5 AEROSOL, POWDER ORAL at 08:13

## 2024-01-01 RX ADMIN — EZETIMIBE 10 MG: 10 TABLET ORAL at 21:01

## 2024-01-01 RX ADMIN — BENZONATATE 100 MG: 100 CAPSULE ORAL at 08:47

## 2024-01-01 RX ADMIN — METOPROLOL SUCCINATE 75 MG: 50 TABLET, EXTENDED RELEASE ORAL at 09:35

## 2024-01-01 RX ADMIN — SEVELAMER HYDROCHLORIDE 800 MG: 800 TABLET ORAL at 12:46

## 2024-01-01 RX ADMIN — BUMETANIDE 3 MG: 1 TABLET ORAL at 09:35

## 2024-01-01 RX ADMIN — FUROSEMIDE 40 MG: 10 INJECTION, SOLUTION INTRAMUSCULAR; INTRAVENOUS at 20:24

## 2024-01-01 RX ADMIN — FAMOTIDINE 20 MG: 20 TABLET ORAL at 08:35

## 2024-01-01 RX ADMIN — OXYCODONE HYDROCHLORIDE AND ACETAMINOPHEN 500 MG: 500 TABLET ORAL at 00:08

## 2024-01-01 RX ADMIN — Medication 2000 UNITS: at 17:01

## 2024-01-01 RX ADMIN — LEVALBUTEROL HYDROCHLORIDE 1.25 MG: 1.25 SOLUTION RESPIRATORY (INHALATION) at 14:31

## 2024-01-01 RX ADMIN — ACETAMINOPHEN 975 MG: 325 TABLET ORAL at 21:24

## 2024-01-01 RX ADMIN — ZOLPIDEM TARTRATE 10 MG: 5 TABLET, COATED ORAL at 00:08

## 2024-01-01 RX ADMIN — FAMOTIDINE 20 MG: 20 TABLET ORAL at 08:56

## 2024-01-01 RX ADMIN — SEVELAMER HYDROCHLORIDE 800 MG: 800 TABLET ORAL at 09:44

## 2024-01-01 RX ADMIN — INSULIN HUMAN 10 UNITS: 100 INJECTION, SOLUTION PARENTERAL at 22:55

## 2024-01-01 RX ADMIN — INSULIN LISPRO 2 UNITS: 100 INJECTION, SOLUTION INTRAVENOUS; SUBCUTANEOUS at 01:01

## 2024-01-01 RX ADMIN — ALBUTEROL SULFATE 2.5 MG: 2.5 SOLUTION RESPIRATORY (INHALATION) at 07:17

## 2024-01-01 RX ADMIN — DILTIAZEM HYDROCHLORIDE 120 MG: 120 CAPSULE, COATED, EXTENDED RELEASE ORAL at 08:07

## 2024-01-01 RX ADMIN — GUAIFENESIN 100 MG: 200 SOLUTION ORAL at 06:16

## 2024-01-01 RX ADMIN — EZETIMIBE 10 MG: 10 TABLET ORAL at 21:24

## 2024-01-01 RX ADMIN — ACETAMINOPHEN 975 MG: 325 TABLET ORAL at 05:49

## 2024-01-01 RX ADMIN — ACETAMINOPHEN 975 MG: 325 TABLET ORAL at 05:59

## 2024-01-01 RX ADMIN — FLECAINIDE ACETATE 150 MG: 50 TABLET ORAL at 17:34

## 2024-01-01 RX ADMIN — Medication 2000 UNITS: at 17:48

## 2024-01-01 RX ADMIN — INSULIN GLARGINE 5 UNITS: 100 INJECTION, SOLUTION SUBCUTANEOUS at 21:01

## 2024-01-01 RX ADMIN — SEVELAMER HYDROCHLORIDE 800 MG: 800 TABLET ORAL at 12:42

## 2024-01-01 RX ADMIN — METOPROLOL SUCCINATE 75 MG: 50 TABLET, EXTENDED RELEASE ORAL at 08:35

## 2024-01-01 RX ADMIN — LEVALBUTEROL HYDROCHLORIDE 1.25 MG: 1.25 SOLUTION RESPIRATORY (INHALATION) at 19:49

## 2024-01-01 RX ADMIN — ACETAMINOPHEN 975 MG: 325 TABLET ORAL at 14:12

## 2024-01-01 RX ADMIN — INSULIN LISPRO 1 UNITS: 100 INJECTION, SOLUTION INTRAVENOUS; SUBCUTANEOUS at 21:26

## 2024-01-01 RX ADMIN — APIXABAN 2.5 MG: 2.5 TABLET, FILM COATED ORAL at 17:34

## 2024-01-01 RX ADMIN — Medication 2000 UNITS: at 00:09

## 2024-01-01 RX ADMIN — ACETAMINOPHEN 975 MG: 325 TABLET ORAL at 14:26

## 2024-01-01 RX ADMIN — SEVELAMER HYDROCHLORIDE 800 MG: 800 TABLET ORAL at 08:35

## 2024-01-01 RX ADMIN — Medication 3 MG: at 21:24

## 2024-01-01 RX ADMIN — APIXABAN 2.5 MG: 2.5 TABLET, FILM COATED ORAL at 17:01

## 2024-01-01 RX ADMIN — INSULIN GLARGINE 5 UNITS: 100 INJECTION, SOLUTION SUBCUTANEOUS at 01:01

## 2024-01-01 RX ADMIN — SALINE NASAL SPRAY 1 SPRAY: 1.5 SOLUTION NASAL at 08:59

## 2024-01-01 RX ADMIN — ROPINIROLE HYDROCHLORIDE 2 MG: 1 TABLET, FILM COATED ORAL at 21:23

## 2024-01-01 RX ADMIN — OXYCODONE HYDROCHLORIDE AND ACETAMINOPHEN 500 MG: 500 TABLET ORAL at 21:24

## 2024-01-01 RX ADMIN — OXYCODONE HYDROCHLORIDE AND ACETAMINOPHEN 500 MG: 500 TABLET ORAL at 21:01

## 2024-01-01 RX ADMIN — FLECAINIDE ACETATE 150 MG: 50 TABLET ORAL at 09:35

## 2024-01-01 RX ADMIN — DILTIAZEM HYDROCHLORIDE 120 MG: 120 CAPSULE, COATED, EXTENDED RELEASE ORAL at 08:35

## 2024-01-01 RX ADMIN — APIXABAN 2.5 MG: 2.5 TABLET, FILM COATED ORAL at 08:35

## 2024-01-01 RX ADMIN — BENZONATATE 100 MG: 100 CAPSULE ORAL at 21:23

## 2024-01-01 RX ADMIN — LORATADINE 5 MG: 10 TABLET ORAL at 09:35

## 2024-01-01 RX ADMIN — SEVELAMER HYDROCHLORIDE 800 MG: 800 TABLET ORAL at 16:53

## 2024-01-01 RX ADMIN — BUMETANIDE 3.25 MG: 0.25 INJECTION INTRAMUSCULAR; INTRAVENOUS at 08:36

## 2024-01-01 RX ADMIN — GABAPENTIN 300 MG: 300 CAPSULE ORAL at 21:34

## 2024-01-01 RX ADMIN — LEVALBUTEROL HYDROCHLORIDE 1.25 MG: 1.25 SOLUTION RESPIRATORY (INHALATION) at 07:06

## 2024-01-01 RX ADMIN — GUAIFENESIN 100 MG: 200 SOLUTION ORAL at 13:55

## 2024-01-01 RX ADMIN — LEVALBUTEROL HYDROCHLORIDE 1.25 MG: 1.25 SOLUTION RESPIRATORY (INHALATION) at 14:05

## 2024-01-01 RX ADMIN — INSULIN LISPRO 1 UNITS: 100 INJECTION, SOLUTION INTRAVENOUS; SUBCUTANEOUS at 21:07

## 2024-01-01 RX ADMIN — Medication 3 MG: at 21:00

## 2024-01-01 RX ADMIN — SODIUM CHLORIDE 1500 MG: 0.9 INJECTION, SOLUTION INTRAVENOUS at 18:19

## 2024-01-01 RX ADMIN — APIXABAN 2.5 MG: 2.5 TABLET, FILM COATED ORAL at 09:35

## 2024-01-01 RX ADMIN — SEVELAMER HYDROCHLORIDE 800 MG: 800 TABLET ORAL at 12:32

## 2024-01-01 RX ADMIN — GUAIFENESIN 100 MG: 200 SOLUTION ORAL at 14:26

## 2024-01-01 RX ADMIN — FUROSEMIDE 40 MG: 10 INJECTION, SOLUTION INTRAMUSCULAR; INTRAVENOUS at 19:09

## 2024-01-01 RX ADMIN — APIXABAN 2.5 MG: 2.5 TABLET, FILM COATED ORAL at 17:48

## 2024-01-01 RX ADMIN — BENZONATATE 100 MG: 100 CAPSULE ORAL at 21:05

## 2024-01-01 RX ADMIN — GABAPENTIN 300 MG: 300 CAPSULE ORAL at 00:08

## 2024-01-01 RX ADMIN — BENZONATATE 100 MG: 100 CAPSULE ORAL at 06:16

## 2024-01-01 RX ADMIN — INSULIN LISPRO 1 UNITS: 100 INJECTION, SOLUTION INTRAVENOUS; SUBCUTANEOUS at 08:57

## 2024-01-01 RX ADMIN — FLECAINIDE ACETATE 150 MG: 50 TABLET ORAL at 08:06

## 2024-01-01 RX ADMIN — LEVALBUTEROL HYDROCHLORIDE 1.25 MG: 1.25 SOLUTION RESPIRATORY (INHALATION) at 13:27

## 2024-01-01 RX ADMIN — GUAIFENESIN 100 MG: 200 SOLUTION ORAL at 08:47

## 2024-01-01 RX ADMIN — SEVELAMER HYDROCHLORIDE 800 MG: 800 TABLET ORAL at 16:31

## 2024-01-01 RX ADMIN — GABAPENTIN 300 MG: 300 CAPSULE ORAL at 21:00

## 2024-01-01 RX ADMIN — GUAIFENESIN 100 MG: 200 SOLUTION ORAL at 21:23

## 2024-01-01 RX ADMIN — LEVALBUTEROL HYDROCHLORIDE 1.25 MG: 1.25 SOLUTION RESPIRATORY (INHALATION) at 07:37

## 2024-01-01 RX ADMIN — ACETAMINOPHEN 975 MG: 325 TABLET ORAL at 21:00

## 2024-01-04 ENCOUNTER — APPOINTMENT (OUTPATIENT)
Dept: RADIOLOGY | Facility: MEDICAL CENTER | Age: 79
DRG: 309 | End: 2024-01-04
Payer: MEDICARE

## 2024-01-04 ENCOUNTER — OFFICE VISIT (OUTPATIENT)
Dept: CARDIOLOGY CLINIC | Facility: CLINIC | Age: 79
End: 2024-01-04
Payer: MEDICARE

## 2024-01-04 VITALS
WEIGHT: 158 LBS | HEART RATE: 100 BPM | DIASTOLIC BLOOD PRESSURE: 74 MMHG | BODY MASS INDEX: 29.08 KG/M2 | SYSTOLIC BLOOD PRESSURE: 118 MMHG | HEIGHT: 62 IN

## 2024-01-04 DIAGNOSIS — I50.32 CHRONIC DIASTOLIC CHF (CONGESTIVE HEART FAILURE) (HCC): ICD-10-CM

## 2024-01-04 DIAGNOSIS — R05.9 COUGH, UNSPECIFIED TYPE: ICD-10-CM

## 2024-01-04 DIAGNOSIS — I48.19 PERSISTENT ATRIAL FIBRILLATION (HCC): ICD-10-CM

## 2024-01-04 DIAGNOSIS — I27.20 PULMONARY HYPERTENSION (HCC): ICD-10-CM

## 2024-01-04 DIAGNOSIS — R05.9 COUGH, UNSPECIFIED TYPE: Primary | ICD-10-CM

## 2024-01-04 PROBLEM — E11.22 TYPE 2 DIABETES MELLITUS WITH CHRONIC KIDNEY DISEASE, WITHOUT LONG-TERM CURRENT USE OF INSULIN, UNSPECIFIED CKD STAGE (HCC): Status: ACTIVE | Noted: 2022-09-19

## 2024-01-04 PROCEDURE — 99214 OFFICE O/P EST MOD 30 MIN: CPT | Performed by: INTERNAL MEDICINE

## 2024-01-04 PROCEDURE — 71046 X-RAY EXAM CHEST 2 VIEWS: CPT

## 2024-01-04 RX ORDER — FUROSEMIDE 20 MG/1
20 TABLET ORAL DAILY
Qty: 90 TABLET | Refills: 3 | Status: SHIPPED | OUTPATIENT
Start: 2024-01-04

## 2024-01-04 NOTE — PROGRESS NOTES
Cardiology Follow Up    Farnaz Martin  1945  [id number]  [facility] [address] [address] [phone]    No diagnosis found.    Interval History: Followup atrial fibrillation, PPM.     Patient given appt. Due to cough over the past two months. She also complains of bilateral lower extremity edema. No angina.     Medical Problems       Problem List       Essential hypertension    Allergic rhinitis    Fibromyalgia    Hyperlipidemia    Insomnia    Lumbar spondylosis    Lumbar stenosis    Osteoarthrosis, hand    Osteoarthritis of knee    Overview Signed 10/4/2018  5:41 PM by Naveen Monsivais MD     Laterality: Right         Osteopenia    Peripheral neuropathy    Restless legs syndrome    TMJ syndrome    Vitamin D deficiency    Osteoarthritis of shoulder    Carpal tunnel syndrome on right    Arthritis of both acromioclavicular joints    Chronic rhinitis    Chronic diastolic CHF (congestive heart failure) (HCC)    Wt Readings from Last 3 Encounters:   01/04/24 71.7 kg (158 lb)   12/19/23 72.6 kg (160 lb)   12/05/23 71.7 kg (158 lb)                 Malignant neoplasm of thyroid gland (HCC)    Current use of long term anticoagulation    S/P placement of cardiac pacemaker    Tremors of nervous system    Hx of diplopia    Hurthle cell adenoma of thyroid    MGUS (monoclonal gammopathy of unknown significance)    Vasomotor rhinitis    Chronic tension-type headache, not intractable    Stage 3b chronic kidney disease (HCC)    Lab Results   Component Value Date    EGFR 72 09/18/2023    EGFR 68 08/28/2023    EGFR 45 08/10/2023    CREATININE 0.78 09/18/2023    CREATININE 0.82 08/28/2023    CREATININE 1.15 08/10/2023         Meningioma (HCC)    Pulmonary hypertension (HCC)    Type 2 diabetes mellitus without complication, without long-term current use of insulin (HCC)        Lab Results   Component Value Date     HGBA1C 6.6 (H) 07/03/2023         Nonrheumatic mitral valve regurgitation    Nonrheumatic aortic valve insufficiency    Nonrheumatic tricuspid valve regurgitation    Abnormal CT of the chest    S/P revision of total knee, left        Past Medical History:   Diagnosis Date    Actinic keratosis     last assessed - 06Jun2014    Arthritis     Atrial fibrillation with rapid ventricular response (HCC)     last assessed - 26Apr2016    Basal cell carcinoma     Benign colon polyp     last assessed - 27Apr2015    CHF (congestive heart failure) ([facility]) 06/2022    Disease of thyroid gland     Effusion of knee joint right     Resolved - 19Apr2016    Esophageal reflux     Fibromyalgia     last assessed - [id number]    Fibromyalgia, primary     GERD (gastroesophageal reflux disease)     Hypertension     Palpitations     last assessed - 30Apr2013    Peroneal tendonitis, right     last assessed - [id number]    Right lumbar radiculopathy 03/17/2016    Thyroid cancer (Former[facility])     Thyroid nodule     Trochanteric bursitis of left hip 03/09/2018     Social History     Socioeconomic History    Marital status:      Spouse name: Not on file    Number of children: Not on file    Years of education: Not on file    Highest education level: Not on file   Occupational History    Not on file   Tobacco Use    Smoking status: Never    Smokeless tobacco: Never   Vaping Use    Vaping status: Never Used   Substance and Sexual Activity    Alcohol use: Not Currently    Drug use: Never    Sexual activity: Not Currently   Other Topics Concern    Not on file   Social History Narrative    Not on file     Social Determinants of Health     Financial Resource Strain: Patient Unable To Answer (12/19/2023)    Overall Financial Resource Strain (CARDIA)     Difficulty of Paying Living Expenses: Patient unable to answer   Food Insecurity: No Food Insecurity (7/31/2023)    Received from [facility]    Hunger Vital Sign     Worried About Running Out  of Food in the Last Year: Never true     Ran Out of Food in the Last Year: Never true   Transportation Needs: No Transportation Needs (12/19/2023)    PRAPARE - Transportation     Lack of Transportation (Medical): No     Lack of Transportation (Non-Medical): No   Physical Activity: Not on file   Stress: Not on file   Social Connections: Not on file   Intimate Partner Violence: Not At Risk (7/31/2023)    Received from [facility]    Humiliation, Afraid, Rape, and Kick questionnaire     Fear of Current or Ex-Partner: No     Emotionally Abused: No     Physically Abused: No     Sexually Abused: No   Housing Stability: Low Risk  (7/31/2023)    Received from [facility]    Housing Stability Vital Sign     Unable to Pay for Housing in the Last Year: No     Number of Places Lived in the Last Year: 1     Unstable Housing in the Last Year: No      Family History   Problem Relation Age of Onset    Heart disease Mother     Diabetes Mother     Heart disease Father     Coronary artery disease Father     Stroke Father         cerebrovascular accident    Heart attack Father         myocardial infarction    Sudden death Father         scd    Other Family         Back disorder    Coronary artery disease Family     Neuropathy Family     Osteoporosis Family     No Known Problems Daughter     No Known Problems Maternal Grandmother     No Known Problems Maternal Grandfather     No Known Problems Paternal Grandmother     No Known Problems Paternal Grandfather     Cancer Maternal Uncle     Breast cancer Maternal Aunt 65    No Known Problems Son     No Known Problems Maternal Aunt     No Known Problems Maternal Aunt     No Known Problems Maternal Aunt     No Known Problems Paternal Aunt     No Known Problems Paternal Aunt     Anuerysm Neg Hx     Clotting disorder Neg Hx     Arrhythmia Neg Hx     Heart failure Neg Hx      Past Surgical History:   Procedure Laterality Date    CARDIAC ELECTROPHYSIOLOGY STUDY  AND ABLATION  08/2022    CATARACT EXTRACTION Bilateral     COLONOSCOPY  03/2018    COLONOSCOPY W/ POLYPECTOMY  2021    repeat in 5 years    EYE SURGERY      HYSTERECTOMY      JOINT REPLACEMENT Left     knee    AL NEUROPLASTY &/TRANSPOS MEDIAN NRV CARPAL TUNNE Right 11/14/2019    Procedure: RELEASE CARPAL TUNNEL;  Surgeon: Onesimo Tate MD;  Location: [facility];  Service: Orthopedics    AL TOTAL THYROID LOBECTOMY UNI W/WO ISTHMUSECTOMY Left 12/16/2020    Procedure: Left THYROID LOBECTOMY;  Surgeon: Jhony Bhatt MD;  Location: [facility];  Service: ENT    RECTAL POLYPECTOMY      REPLACEMENT TOTAL KNEE Left     last assessed - [id number]    TONSILLECTOMY      TOTAL ABDOMINAL HYSTERECTOMY      TUBAL LIGATION      US GUIDED THYROID BIOPSY  10/14/2020       Current Outpatient Medications:     apixaban (ELIQUIS) 5 mg, Take 1 tablet (5 mg total) by mouth 2 (two) times a day, Disp: 42 tablet, Rfl: 0    Arthritis Pain Relief 650 MG CR tablet, Take 650 mg by mouth 3 (three) times a day, Disp: , Rfl:     ascorbic acid (VITAMIN C) 500 mg tablet, Take 500 mg by mouth daily , Disp: , Rfl:     Cetirizine HCl (ZyrTEC Allergy) 10 MG CAPS, Take 10 mg by mouth in the morning, Disp: , Rfl:     Cholecalciferol 50 MCG (2000 UT) CAPS, Take 2,000 Units by mouth 2 (two) times a day, Disp: , Rfl:     Chromium Picolinate (CHROMIUM PICOLATE PO), Take 1 tablet by mouth daily, Disp: , Rfl:     diltiazem (CARDIZEM CD) 120 mg 24 hr capsule, Take 1 capsule (120 mg total) by mouth 2 (two) times a day, Disp: 180 capsule, Rfl: 3    DULoxetine (CYMBALTA) 30 mg delayed release capsule, take 1 capsule by mouth once daily, Disp: 30 capsule, Rfl: 2    ezetimibe (ZETIA) 10 mg tablet, take 1 tablet by mouth once daily, Disp: 90 tablet, Rfl: 3    flecainide (TAMBOCOR) 100 mg tablet, Take 1 tablet (100 mg total) by mouth 2 (two) times a day, Disp: 52706 tablet, Rfl: 3    furosemide (LASIX) 20 mg tablet, take 2 tablet by mouth daily if needed for shortness of  breath WEIGHT GAIN 3 LBS IN 1 DAY OR LOWER LEG SWELLING, Disp: 20 tablet, Rfl: 3    gabapentin (NEURONTIN) 300 mg capsule, Take 1 capsule (300 mg total) by mouth daily at bedtime, Disp: 90 capsule, Rfl: 1    ipratropium (ATROVENT) 0.03 % nasal spray, 2 sprays into each nostril 3 (three) times a day Begin once per day, then progress to twice per day. Progress to three times a day as tolerated., Disp: 90 mL, Rfl: 3    lidocaine (XYLOCAINE) 5 % ointment, Apply topically as needed for mild pain, Disp: 35.44 g, Rfl: 0    losartan (COZAAR) 50 mg tablet, Take 1 tablet (50 mg total) by mouth 2 (two) times a day, Disp: 180 tablet, Rfl: 3    metoprolol succinate (TOPROL-XL) 25 mg 24 hr tablet, TAKE 4 TABLETS BY MOUTH EVERY 12 HOURS, Disp: 240 tablet, Rfl: 1    rOPINIRole (REQUIP) 1 mg tablet, Take 2 tablets (2 mg total) by mouth daily at bedtime, Disp: 180 tablet, Rfl: 1    Saccharomyces boulardii (Probiotic) 250 MG CAPS, Take 1 capsule by mouth daily, Disp: , Rfl:     TURMERIC PO, Take 1 capsule by mouth 2 (two) times a day, Disp: , Rfl:     zolpidem (AMBIEN) 10 mg tablet, Take 1 tablet (10 mg total) by mouth daily at bedtime as needed for sleep, Disp: 90 tablet, Rfl: 1  Allergies   Allergen Reactions    Sotalol Other (See Comments)     Prolonged QTc leading to torsades de pointes     Penicillins Other (See Comments)     As a child calcium deposit in the arm     Ace Inhibitors GI Intolerance     Did feel well on it    Omeprazole Abdominal Pain, Rash and Vomiting     stomach pain, vomiting, rash       Labs:     Chemistry        Component Value Date/Time     05/06/2015 1116    K 4.4 09/18/2023 0819    K 4.8 05/06/2015 1116    CL 97 09/18/2023 0819    CL 99 (L) 05/06/2015 1116    CO2 28 09/18/2023 0819    CO2 28 05/06/2015 1116    BUN 20 09/18/2023 0819    BUN 19 05/06/2015 1116    CREATININE 0.78 09/18/2023 0819    CREATININE 0.97 05/06/2015 1116        Component Value Date/Time    CALCIUM 9.7 09/18/2023 0819    CALCIUM  "9.0 05/06/2015 1116    ALKPHOS 128 (H) 08/28/2023 0829    ALKPHOS 60 05/06/2015 1116    AST 15 08/28/2023 0829    AST 28 05/06/2015 1116    ALT 16 08/28/2023 0829    ALT 34 05/06/2015 1116    BILITOT 0.78 05/06/2015 1116            Lab Results   Component Value Date    CHOL 205 05/06/2015    CHOL 195 07/10/2014     Lab Results   Component Value Date    HDL 53 07/03/2023    HDL 66 04/01/2021    HDL 54 08/03/2020     Lab Results   Component Value Date    LDLCALC 96 07/03/2023    LDLCALC 88 04/01/2021    LDLCALC 104 (H) 08/03/2020     Lab Results   Component Value Date    TRIG 68 07/03/2023    TRIG 112 04/01/2021    TRIG 121 08/03/2020     No results found for: \"CHOLHDL\"    Imaging: US elastography/UGAP    Result Date: 12/27/2023  Narrative: ELASTOGRAPHY, LIVER ULTRASOUND INDICATION:   R16.0: Hepatomegaly, not elsewhere classified. COMPARISON: 8/5/2023 TECHNIQUE: Targeted ultrasound of the liver was performed with a curvilinear transducer on a [employer] e10 utilizing 2D SWE.  A total of 10 shear wave Elastography samples were obtained. FINDINGS: Shear Wave Elastography sampling was performed to evaluate stiffness changes within the liver associated with fibrosis. E1  6.94 kPa E2  5.53 kPa E3  5.92 kPa E4  6.93 kPa E5  6.13 kPa E6  7.69 kPa E7  7.07 kPa E8  6.54 kPa E9  5.73 kPa E10 6.57 kPa E median:  6.56 kPa. The corresponding Metavir score is F0-F1(absent or mild fibrosis), range 0-8.26kPa. <1.66 m/s. IQR/median:  14.7 %.  (IQR of less than 30% is considered a satisfactory data set). Note: that the stage of liver fibrosis may be overestimated by clinical conditions unrelated to fibrosis, including but not limited to, nonfasting, hepatic inflammation, vascular congestion, biliary obstruction, and infiltrative liver disease. Furthermore, in some patients with NAFLD, the cut-off values for compensated advanced chronic liver disease may be lower (7-9 kPa). In causes other than viral hepatitis and nonalcoholic fatty liver " disease, the cut-off values are not well established. When comparing measurements across time, a clinically significant change should be considered when the delta change is greater than 10%. In all these conditions, however, liver stiffness values within the normal range exclude significant liver fibrosis. Ultrasound-guided attenuation parameter (UGAP) Liver Steatosis Grading A1  0.64 dB/cm/MHz A2  0.68 dB/cm/MHz A3  0.67 dB/cm/MHz A4  0.65 dB/cm/MHz A5  0.62 dB/cm/MHz A6  0.62 dB/cm/MHz A7  0.65 dB/cm/MHz A8  0.69 dB/cm/MHz A9  0.67 dB/cm/MHz A10 0.63 dB/cm/MHz Attenuation coefficient:  0.65 dB/cm/MHz Liver steatosis grading:  S1 ( 5% - 33% steatosis) range 0.65-0.70 dB/cm/MHz IQR/Med:  5.5 % (Less than or equal to 30% is a satisfactory data set.) Reference White paper for [employer] ultrasound-guided attenuation parameter [email or web address]?la=en-au 8 mm sessile gallbladder polyp again seen.     Impression: Metavir score: F0 - F1, Absent or Mild Fibrosis According to the updated SRU consensus statement, a liver stiffness of 6.56 kPa, which in the absence of other known clinical signs*, rules out compensated advanced chronic liver disease (cACLD). If there are known clinical signs, may need further test for confirmation. https://pubs.rsna.org/doi/10.1148/radiol.2607721659 Liver steatosis grading:  S1 ( 5% - 33% steatosis) 8 mm gallbladder polyp again seen It is sessile in appearance. According to current consensus recommendations (SRU 2022; 000:1-12), for polyps of this size (7-9 mm) which have a low risk morphology (pedunculated with thick/wide stalk, or sessile), follow-up ultrasound is recommended at 12 months to ensure stability. Reference: Management of Incidentally Detected Gallbladder Polyps: Society of Radiologists in Ultrasound Consensus Conference Recommendations. Radiology 2022; 000:1-12. https://pubs.rsna.org/doi/full/10.1148/radiol.646564 The study  was marked in EPIC for significant notification. Workstation performed: [id number]     US thyroid    Result Date: 12/27/2023  Narrative: THYROID ULTRASOUND INDICATION:    E04.1: Nontoxic single thyroid nodule. COMPARISON: Thyroid ultrasound 9/25/2020 TECHNIQUE:   Ultrasound of the thyroid was performed with a high frequency linear transducer in transverse and sagittal planes including volumetric imaging sweeps as well as traditional still imaging technique. FINDINGS:  Thyroid parenchyma is diffusely heterogeneous in echotexture. Right lobe: 5.3 x 3.0 x 2.4 cm. Volume 18.5 mL Left lobe: Surgically absent. Isthmus: 0.4  cm. Nodule #1.  Image 21. RIGHT midgland anterior nodule measuring 1.8 x 1.4 x 1.3 cm. Not well delineated on the prior study. COMPOSITION:  2 points, solid or almost completely solid . ECHOGENICITY:  1 point, hyperechoic or isoechoic. SHAPE:  0 points, wider-than-tall. MARGIN: 0 points, ill-defined. ECHOGENIC FOCI:  0 points, none or large comet-tail artifacts. TI-RADS Classification: TR 3 (3 points), FNA if >2.5 cm.  Follow if >1.5 cm. Nodule #2.  Image 26. RIGHT midgland posterior nodule measuring 0.9 x 0.8 x 1.0 cm.  Given differences in measuring technique, no significant change from prior. COMPOSITION:  2 points, solid or almost completely solid . ECHOGENICITY:  2 points, hypoechoic. SHAPE:  0 points, wider-than-tall. MARGIN: 0 points, ill-defined. ECHOGENIC FOCI:  0 points, none or large comet-tail artifacts. TI-RADS Classification: TR 4 (4-6 points), FNA if > 1.5 cm. Follow if > 1cm. Nodule #3.  Image 29. RIGHT lower pole nodule measuring 2.5 x 1.7 x 2.5 cm. COMPOSITION:  1 point, mixed cystic and solid. ECHOGENICITY:  1 point, hyperechoic or isoechoic. SHAPE:  0 points, wider-than-tall. MARGIN: 0 points, ill-defined. ECHOGENIC FOCI:  0 points, none or large comet-tail artifacts. TI-RADS Classification: TR 2 (2 points), Not suspious. No FNA. There are additional nodules of lesser size and/or  "TI-RADS score.  These do not necessitate additional evaluation based on ACR criteria.     Impression: No nodule meets current ACR criteria for requiring biopsy but followup ultrasound is recommended in 1 year. Reference: ACR Thyroid Imaging, Reporting and Data System (TI-RADS): White Paper of the ACR TI-RADS Committee. J AM Maribel Radiol 2017;14:587-595. (additional recommendations based on American Thyroid Association 2015 guidelines.) This examination demonstrates a finding requiring imaging follow-up and was logged as such in Epic. Workstation performed: [id number]     Cardiac EP device report    Result Date: 12/21/2023  Narrative: MDT-DUAL CHAMBER PPM (AAIR-DDDR MODE)/ ACTIVE SYSTEM IS MRI CONDITIONAL CARELINK TRANSMISSION: BATTERY STATUS \"11 YRS.\" AP 96%  0%. ALL AVAILABLE LEAD PARAMETERS WITHIN NORMAL LIMITS. NO SIGNIFICANT HIGH RATE EPISODES. NORMAL DEVICE FUNCTION. NC       EKG: .    Review of Systems   Constitutional: Negative.   HENT: Negative.     Eyes: Negative.    Cardiovascular: Negative.    Respiratory: Negative.     Endocrine: Negative.    Hematologic/Lymphatic: Negative.    Skin: Negative.    Musculoskeletal: Negative.    Gastrointestinal: Negative.    Genitourinary: Negative.    Neurological: Negative.    Psychiatric/Behavioral: Negative.     Allergic/Immunologic: Negative.        Vitals:    01/04/24 1056   BP: 118/74   Pulse: 100           Physical Exam  Vitals and nursing note reviewed.   Constitutional:       Appearance: Normal appearance.   HENT:      Head: Normocephalic.      Nose: Nose normal.      Mouth/Throat:      Mouth: Mucous membranes are moist.   Eyes:      General: No scleral icterus.     Conjunctiva/sclera: Conjunctivae normal.   Cardiovascular:      Rate and Rhythm: Normal rate and regular rhythm.      Heart sounds: No murmur heard.     No gallop.   Pulmonary:      Effort: Pulmonary effort is normal. No respiratory distress.      Breath sounds: Normal breath sounds. No wheezing or " rales.   Abdominal:      General: Abdomen is flat. Bowel sounds are normal. There is no distension.      Palpations: Abdomen is soft.      Tenderness: There is no abdominal tenderness. There is no guarding.   Musculoskeletal:      Cervical back: Normal range of motion and neck supple.      Right lower leg: No edema.      Left lower leg: No edema.   Skin:     General: Skin is warm and dry.   Neurological:      General: No focal deficit present.      Mental Status: She is alert and oriented to person, place, and time.   Psychiatric:         Mood and Affect: Mood normal.         Behavior: Behavior normal.         Discussion/Summary:    Chronic Diastolic CHF: patient c/o cough and bilateral LE edema. She takes lasix prn. Recommend to take lasix 20 mg daily. Check bmp in a week and call the office in a week to touch base with Dr. Cunningham re: further recs on diuretics. Echo earlier this month revealed elevated filling pressures. Check CXR as well.     Persistent AF: Regular on exam today. Continue with Eliquis, Cardizem, Metoprolol and Flecainide.     S/P PPM implant. 96% A paced on last device check.       The patient was counseled regarding diagnostic results, instructions for management, risk factor reductions, impressions. total time of encounter was 25 minutes and 15 minutes was spent counseling.

## 2024-01-05 ENCOUNTER — HOSPITAL ENCOUNTER (INPATIENT)
Facility: HOSPITAL | Age: 79
LOS: 1 days | Discharge: HOME | DRG: 309 | End: 2024-01-06
Attending: INTERNAL MEDICINE | Admitting: INTERNAL MEDICINE
Payer: MEDICARE

## 2024-01-05 ENCOUNTER — APPOINTMENT (EMERGENCY)
Dept: CT IMAGING | Facility: HOSPITAL | Age: 79
DRG: 309 | End: 2024-01-05
Payer: MEDICARE

## 2024-01-05 ENCOUNTER — APPOINTMENT (EMERGENCY)
Dept: RADIOLOGY | Facility: HOSPITAL | Age: 79
DRG: 309 | End: 2024-01-05
Payer: MEDICARE

## 2024-01-05 ENCOUNTER — TELEPHONE (OUTPATIENT)
Dept: CARDIOLOGY CLINIC | Facility: CLINIC | Age: 79
End: 2024-01-05

## 2024-01-05 DIAGNOSIS — E83.42 HYPOMAGNESEMIA: ICD-10-CM

## 2024-01-05 DIAGNOSIS — R00.0 SINUS TACHYCARDIA: ICD-10-CM

## 2024-01-05 DIAGNOSIS — R00.2 PALPITATIONS: Primary | ICD-10-CM

## 2024-01-05 DIAGNOSIS — E04.1 THYROID NODULE: ICD-10-CM

## 2024-01-05 DIAGNOSIS — Z95.0 PACEMAKER: ICD-10-CM

## 2024-01-05 DIAGNOSIS — E87.1 HYPONATREMIA: ICD-10-CM

## 2024-01-05 DIAGNOSIS — R06.00 DYSPNEA: ICD-10-CM

## 2024-01-05 LAB
2HR DELTA HS TROPONIN: 1 NG/L
4HR DELTA HS TROPONIN: 1 NG/L
ALBUMIN SERPL BCP-MCNC: 4.2 G/DL (ref 3.5–5)
ALP SERPL-CCNC: 89 U/L (ref 34–104)
ALT SERPL W P-5'-P-CCNC: 27 U/L (ref 7–52)
ANION GAP SERPL CALCULATED.3IONS-SCNC: 11 MMOL/L
APTT PPP: 33 SECONDS (ref 23–37)
AST SERPL W P-5'-P-CCNC: 24 U/L (ref 13–39)
ATRIAL RATE: 121 BPM
BASOPHILS # BLD AUTO: 0.04 THOUSANDS/ÂΜL (ref 0–0.1)
BASOPHILS NFR BLD AUTO: 1 % (ref 0–1)
BILIRUB SERPL-MCNC: 0.55 MG/DL (ref 0.2–1)
BUN SERPL-MCNC: 19 MG/DL (ref 5–25)
CALCIUM SERPL-MCNC: 9 MG/DL (ref 8.4–10.2)
CARDIAC TROPONIN I PNL SERPL HS: 7 NG/L
CARDIAC TROPONIN I PNL SERPL HS: 8 NG/L
CARDIAC TROPONIN I PNL SERPL HS: 8 NG/L
CHLORIDE SERPL-SCNC: 93 MMOL/L (ref 96–108)
CO2 SERPL-SCNC: 25 MMOL/L (ref 21–32)
CREAT SERPL-MCNC: 0.85 MG/DL (ref 0.6–1.3)
EOSINOPHIL # BLD AUTO: 0.14 THOUSAND/ÂΜL (ref 0–0.61)
EOSINOPHIL NFR BLD AUTO: 3 % (ref 0–6)
ERYTHROCYTE [DISTWIDTH] IN BLOOD BY AUTOMATED COUNT: 17.4 % (ref 11.6–15.1)
GFR SERPL CREATININE-BSD FRML MDRD: 65 ML/MIN/1.73SQ M
GLUCOSE SERPL-MCNC: 123 MG/DL (ref 65–140)
HCT VFR BLD AUTO: 37.6 % (ref 34.8–46.1)
HGB BLD-MCNC: 12.5 G/DL (ref 11.5–15.4)
IMM GRANULOCYTES # BLD AUTO: 0.01 THOUSAND/UL (ref 0–0.2)
IMM GRANULOCYTES NFR BLD AUTO: 0 % (ref 0–2)
INR PPP: 1.26 (ref 0.84–1.19)
LYMPHOCYTES # BLD AUTO: 1.48 THOUSANDS/ÂΜL (ref 0.6–4.47)
LYMPHOCYTES NFR BLD AUTO: 28 % (ref 14–44)
MAGNESIUM SERPL-MCNC: 1.7 MG/DL (ref 1.9–2.7)
MCH RBC QN AUTO: 28.9 PG (ref 26.8–34.3)
MCHC RBC AUTO-ENTMCNC: 33.2 G/DL (ref 31.4–37.4)
MCV RBC AUTO: 87 FL (ref 82–98)
MONOCYTES # BLD AUTO: 0.82 THOUSAND/ÂΜL (ref 0.17–1.22)
MONOCYTES NFR BLD AUTO: 15 % (ref 4–12)
NEUTROPHILS # BLD AUTO: 2.86 THOUSANDS/ÂΜL (ref 1.85–7.62)
NEUTS SEG NFR BLD AUTO: 53 % (ref 43–75)
NRBC BLD AUTO-RTO: 0 /100 WBCS
PLATELET # BLD AUTO: 257 THOUSANDS/UL (ref 149–390)
PMV BLD AUTO: 9.5 FL (ref 8.9–12.7)
POTASSIUM SERPL-SCNC: 4.1 MMOL/L (ref 3.5–5.3)
PR INTERVAL: 120 MS
PROT SERPL-MCNC: 7.3 G/DL (ref 6.4–8.4)
PROTHROMBIN TIME: 16.5 SECONDS (ref 11.6–14.5)
QRS AXIS: -34 DEGREES
QRSD INTERVAL: 204 MS
QT INTERVAL: 410 MS
QTC INTERVAL: 582 MS
RBC # BLD AUTO: 4.33 MILLION/UL (ref 3.81–5.12)
SODIUM SERPL-SCNC: 129 MMOL/L (ref 135–147)
T WAVE AXIS: 97 DEGREES
TSH SERPL DL<=0.05 MIU/L-ACNC: 3.64 UIU/ML (ref 0.45–4.5)
VENTRICULAR RATE: 121 BPM
WBC # BLD AUTO: 5.35 THOUSAND/UL (ref 4.31–10.16)

## 2024-01-05 PROCEDURE — 84439 ASSAY OF FREE THYROXINE: CPT

## 2024-01-05 PROCEDURE — 85025 COMPLETE CBC W/AUTO DIFF WBC: CPT | Performed by: PHYSICIAN ASSISTANT

## 2024-01-05 PROCEDURE — 84443 ASSAY THYROID STIM HORMONE: CPT | Performed by: PHYSICIAN ASSISTANT

## 2024-01-05 PROCEDURE — 84484 ASSAY OF TROPONIN QUANT: CPT | Performed by: PHYSICIAN ASSISTANT

## 2024-01-05 PROCEDURE — 96361 HYDRATE IV INFUSION ADD-ON: CPT

## 2024-01-05 PROCEDURE — 84480 ASSAY TRIIODOTHYRONINE (T3): CPT

## 2024-01-05 PROCEDURE — 85730 THROMBOPLASTIN TIME PARTIAL: CPT | Performed by: PHYSICIAN ASSISTANT

## 2024-01-05 PROCEDURE — 80053 COMPREHEN METABOLIC PANEL: CPT | Performed by: PHYSICIAN ASSISTANT

## 2024-01-05 PROCEDURE — 83735 ASSAY OF MAGNESIUM: CPT | Performed by: PHYSICIAN ASSISTANT

## 2024-01-05 PROCEDURE — 71045 X-RAY EXAM CHEST 1 VIEW: CPT

## 2024-01-05 PROCEDURE — 71275 CT ANGIOGRAPHY CHEST: CPT

## 2024-01-05 PROCEDURE — 99285 EMERGENCY DEPT VISIT HI MDM: CPT

## 2024-01-05 PROCEDURE — 36415 COLL VENOUS BLD VENIPUNCTURE: CPT | Performed by: PHYSICIAN ASSISTANT

## 2024-01-05 PROCEDURE — 93005 ELECTROCARDIOGRAM TRACING: CPT

## 2024-01-05 PROCEDURE — 85610 PROTHROMBIN TIME: CPT | Performed by: PHYSICIAN ASSISTANT

## 2024-01-05 PROCEDURE — 99285 EMERGENCY DEPT VISIT HI MDM: CPT | Performed by: PHYSICIAN ASSISTANT

## 2024-01-05 PROCEDURE — 96365 THER/PROPH/DIAG IV INF INIT: CPT

## 2024-01-05 RX ORDER — ASCORBIC ACID 500 MG
500 TABLET ORAL DAILY
Status: DISCONTINUED | OUTPATIENT
Start: 2024-01-06 | End: 2024-01-06 | Stop reason: HOSPADM

## 2024-01-05 RX ORDER — ROPINIROLE 1 MG/1
2 TABLET, FILM COATED ORAL
Status: DISCONTINUED | OUTPATIENT
Start: 2024-01-05 | End: 2024-01-06 | Stop reason: HOSPADM

## 2024-01-05 RX ORDER — SACCHAROMYCES BOULARDII 250 MG
250 CAPSULE ORAL DAILY
Status: DISCONTINUED | OUTPATIENT
Start: 2024-01-06 | End: 2024-01-06 | Stop reason: HOSPADM

## 2024-01-05 RX ORDER — LOSARTAN POTASSIUM 50 MG/1
50 TABLET ORAL 2 TIMES DAILY
Status: DISCONTINUED | OUTPATIENT
Start: 2024-01-05 | End: 2024-01-06 | Stop reason: HOSPADM

## 2024-01-05 RX ORDER — EZETIMIBE 10 MG/1
10 TABLET ORAL DAILY
Status: DISCONTINUED | OUTPATIENT
Start: 2024-01-06 | End: 2024-01-06 | Stop reason: HOSPADM

## 2024-01-05 RX ORDER — GABAPENTIN 300 MG/1
300 CAPSULE ORAL
Status: DISCONTINUED | OUTPATIENT
Start: 2024-01-05 | End: 2024-01-06 | Stop reason: HOSPADM

## 2024-01-05 RX ORDER — ACETAMINOPHEN 325 MG/1
650 TABLET ORAL EVERY 6 HOURS PRN
Status: DISCONTINUED | OUTPATIENT
Start: 2024-01-05 | End: 2024-01-06 | Stop reason: HOSPADM

## 2024-01-05 RX ORDER — DILTIAZEM HYDROCHLORIDE 120 MG/1
120 CAPSULE, COATED, EXTENDED RELEASE ORAL 2 TIMES DAILY
Status: DISCONTINUED | OUTPATIENT
Start: 2024-01-05 | End: 2024-01-06 | Stop reason: HOSPADM

## 2024-01-05 RX ORDER — FLECAINIDE ACETATE 50 MG/1
100 TABLET ORAL 2 TIMES DAILY
Status: DISCONTINUED | OUTPATIENT
Start: 2024-01-05 | End: 2024-01-06 | Stop reason: HOSPADM

## 2024-01-05 RX ORDER — MAGNESIUM SULFATE 1 G/100ML
1 INJECTION INTRAVENOUS ONCE
Status: COMPLETED | OUTPATIENT
Start: 2024-01-05 | End: 2024-01-05

## 2024-01-05 RX ORDER — SODIUM CHLORIDE 9 MG/ML
150 INJECTION, SOLUTION INTRAVENOUS CONTINUOUS
Status: DISCONTINUED | OUTPATIENT
Start: 2024-01-05 | End: 2024-01-05

## 2024-01-05 RX ORDER — DULOXETIN HYDROCHLORIDE 30 MG/1
30 CAPSULE, DELAYED RELEASE ORAL DAILY
Status: DISCONTINUED | OUTPATIENT
Start: 2024-01-06 | End: 2024-01-06 | Stop reason: HOSPADM

## 2024-01-05 RX ORDER — ZOLPIDEM TARTRATE 5 MG/1
10 TABLET ORAL
Status: DISCONTINUED | OUTPATIENT
Start: 2024-01-05 | End: 2024-01-06 | Stop reason: HOSPADM

## 2024-01-05 RX ORDER — FUROSEMIDE 20 MG/1
20 TABLET ORAL DAILY
Status: DISCONTINUED | OUTPATIENT
Start: 2024-01-06 | End: 2024-01-06 | Stop reason: HOSPADM

## 2024-01-05 RX ORDER — METOPROLOL SUCCINATE 100 MG/1
100 TABLET, EXTENDED RELEASE ORAL EVERY 12 HOURS SCHEDULED
Status: DISCONTINUED | OUTPATIENT
Start: 2024-01-05 | End: 2024-01-06 | Stop reason: HOSPADM

## 2024-01-05 RX ADMIN — IOHEXOL 70 ML: 350 INJECTION, SOLUTION INTRAVENOUS at 18:30

## 2024-01-05 RX ADMIN — SODIUM CHLORIDE 500 ML: 0.9 INJECTION, SOLUTION INTRAVENOUS at 17:16

## 2024-01-05 RX ADMIN — DILTIAZEM HYDROCHLORIDE 120 MG: 120 CAPSULE, COATED, EXTENDED RELEASE ORAL at 22:21

## 2024-01-05 RX ADMIN — APIXABAN 5 MG: 5 TABLET, FILM COATED ORAL at 22:20

## 2024-01-05 RX ADMIN — LOSARTAN POTASSIUM 50 MG: 50 TABLET, FILM COATED ORAL at 22:21

## 2024-01-05 RX ADMIN — MAGNESIUM SULFATE 1 G: 1 INJECTION INTRAVENOUS at 18:58

## 2024-01-05 RX ADMIN — METOPROLOL SUCCINATE 100 MG: 100 TABLET, EXTENDED RELEASE ORAL at 22:21

## 2024-01-05 RX ADMIN — GABAPENTIN 300 MG: 300 CAPSULE ORAL at 22:21

## 2024-01-05 RX ADMIN — ROPINIROLE HYDROCHLORIDE 2 MG: 1 TABLET, FILM COATED ORAL at 22:21

## 2024-01-05 RX ADMIN — ZOLPIDEM TARTRATE 10 MG: 5 TABLET ORAL at 22:24

## 2024-01-05 RX ADMIN — FLECAINIDE ACETATE 100 MG: 50 TABLET ORAL at 22:21

## 2024-01-05 RX ADMIN — SODIUM CHLORIDE 150 ML/HR: 0.9 INJECTION, SOLUTION INTRAVENOUS at 18:58

## 2024-01-05 NOTE — TELEPHONE ENCOUNTER
Farnaz was seen yesterday by Dr Guerra in [place]. She called on 12/29 with symptoms, Dr Guerra said he would see her in the office this week.    She has been coughing, and had LE edema.  At the , Dr Guerra ordered a CXR which was done yesterday. Result shows no fluid in lungs. Patient is aware.  Dr Guerra also instructed Farnaz to take lasix 20 mg daily.    Today Farnaz called and said she is still not feeling well. She said she is feeling very weak.  Her edema is less today since she has taken two doses of lasix since yesterday and her weight is down 2 lbs.     Her BP this AM at home is 100/84 with a HR of 124 and 90 minutes later /75,  .  She said she can't tell if she is in A fib but does occasionally feel a flutter in her chest.  Her HR is usually in the 60s.      She is not feeling lightheaded but get short of breath with minimal movement or activity.    I advised Farnaz to send in a transmission from her device. I will alert the device clinic to its arrival.    Please advise.

## 2024-01-05 NOTE — TELEPHONE ENCOUNTER
"Per Tiger text message from Dr Cunningham after reviewing device transmission :   \"She is in sinus tachycardia in the 120 range. No A fib.  She should get checked in the ER.  Something else is likely driving the sinus tach. Needs labs and CXR there\"    I left a vm for Farnaz asking her to call me back.    \"  "

## 2024-01-05 NOTE — TELEPHONE ENCOUNTER
I spoke with Farnaz and advised her of Dr Cunningham's response and recommendation to go to ER.  Farnaz verbalized understanding.

## 2024-01-05 NOTE — TELEPHONE ENCOUNTER
Order by Dr Guerra for a BMP to be done in one week.  No recent labs, but outstanding orders in chart from her PCP which were ordered on 12/19.

## 2024-01-05 NOTE — ED PROVIDER NOTES
History  Chief Complaint   Patient presents with    Palpitations     Pt reports hx of afib. Feels like she has been having palpitations today. Was fine at PCP yesterday.        History provided by:  Patient  Palpitations  Palpitations quality:  Fast  Onset quality:  At rest  Duration:  1 day  Timing:  Constant  Progression:  Waxing and waning  Chronicity:  New  Context: not anxiety, not appetite suppressants, not blood loss, not bronchodilators, not caffeine, not dehydration, not exercise, not hyperventilation, not illicit drugs, not nicotine and not stimulant use    Relieved by:  Nothing  Worsened by:  Nothing  Ineffective treatments:  Bed rest  Associated symptoms: no back pain, no chest pain, no chest pressure, no cough, no diaphoresis, no dizziness, no hemoptysis, no leg pain, no lower extremity edema, no malaise/fatigue, no nausea, no near-syncope, no numbness, no orthopnea, no PND, no shortness of breath, no syncope, no vomiting and no weakness    Risk factors: no diabetes mellitus, no heart disease, no hx of PE, no hx of thyroid disease, no hyperthyroidism and no OTC sinus medications        Prior to Admission Medications   Prescriptions Last Dose Informant Patient Reported? Taking?   Arthritis Pain Relief 650 MG CR tablet  Self Yes No   Sig: Take 650 mg by mouth 3 (three) times a day   Cetirizine HCl (ZyrTEC Allergy) 10 MG CAPS  Self Yes No   Sig: Take 10 mg by mouth in the morning   Cholecalciferol 50 MCG (2000 UT) CAPS  Self Yes No   Sig: Take 2,000 Units by mouth 2 (two) times a day   Chromium Picolinate (CHROMIUM PICOLATE PO)  Self Yes No   Sig: Take 1 tablet by mouth daily   DULoxetine (CYMBALTA) 30 mg delayed release capsule  Self No No   Sig: take 1 capsule by mouth once daily   Saccharomyces boulardii (Probiotic) 250 MG CAPS  Self Yes No   Sig: Take 1 capsule by mouth daily   TURMERIC PO  Self Yes No   Sig: Take 1 capsule by mouth 2 (two) times a day   apixaban (ELIQUIS) 5 mg  Self No No   Sig: Take  1 tablet (5 mg total) by mouth 2 (two) times a day   ascorbic acid (VITAMIN C) 500 mg tablet  Self Yes No   Sig: Take 500 mg by mouth daily    diltiazem (CARDIZEM CD) 120 mg 24 hr capsule  Self No No   Sig: Take 1 capsule (120 mg total) by mouth 2 (two) times a day   ezetimibe (ZETIA) 10 mg tablet  Self No No   Sig: take 1 tablet by mouth once daily   flecainide (TAMBOCOR) 100 mg tablet  Self No No   Sig: Take 1 tablet (100 mg total) by mouth 2 (two) times a day   furosemide (LASIX) 20 mg tablet   No No   Sig: Take 1 tablet (20 mg total) by mouth daily take 2 tablet by mouth daily if needed for shortness of breath WEIGHT GAIN 3 LBS IN 1 DAY OR LOWER LEG SWELLING   gabapentin (NEURONTIN) 300 mg capsule  Self No No   Sig: Take 1 capsule (300 mg total) by mouth daily at bedtime   ipratropium (ATROVENT) 0.03 % nasal spray  Self No No   Si sprays into each nostril 3 (three) times a day Begin once per day, then progress to twice per day. Progress to three times a day as tolerated.   lidocaine (XYLOCAINE) 5 % ointment  Self No No   Sig: Apply topically as needed for mild pain   losartan (COZAAR) 50 mg tablet  Self No No   Sig: Take 1 tablet (50 mg total) by mouth 2 (two) times a day   metoprolol succinate (TOPROL-XL) 25 mg 24 hr tablet  Self No No   Sig: TAKE 4 TABLETS BY MOUTH EVERY 12 HOURS   rOPINIRole (REQUIP) 1 mg tablet  Self No No   Sig: Take 2 tablets (2 mg total) by mouth daily at bedtime   zolpidem (AMBIEN) 10 mg tablet  Self No No   Sig: Take 1 tablet (10 mg total) by mouth daily at bedtime as needed for sleep      Facility-Administered Medications: None       Past Medical History:   Diagnosis Date    Actinic keratosis     last assessed - 2014    Arthritis     Atrial fibrillation with rapid ventricular response (HCC)     last assessed - 2016    Basal cell carcinoma     Benign colon polyp     last assessed - 2015    CHF (congestive heart failure) (HCC) 2022    Disease of thyroid gland      Effusion of knee joint right     Resolved - [id number]    Esophageal reflux     Fibromyalgia     last assessed - [id number]    Fibromyalgia, primary     GERD (gastroesophageal reflux disease)     Hypertension     Palpitations     last assessed - [id number]    Peroneal tendonitis, right     last assessed - [id number]    Right lumbar radiculopathy 03/17/2016    Thyroid cancer (HCC)     Thyroid nodule     Trochanteric bursitis of left hip 03/09/2018       Past Surgical History:   Procedure Laterality Date    CARDIAC ELECTROPHYSIOLOGY STUDY AND ABLATION  08/2022    CATARACT EXTRACTION Bilateral     COLONOSCOPY  03/2018    COLONOSCOPY W/ POLYPECTOMY  2021    repeat in 5 years    EYE SURGERY      HYSTERECTOMY      JOINT REPLACEMENT Left     knee    NV NEUROPLASTY &/TRANSPOS MEDIAN NRV CARPAL TUNNE Right 11/14/2019    Procedure: RELEASE CARPAL TUNNEL;  Surgeon: Onesimo Tate MD;  Location: [facility];  Service: Orthopedics    NV TOTAL THYROID LOBECTOMY UNI W/WO ISTHMUSECTOMY Left 12/16/2020    Procedure: Left THYROID LOBECTOMY;  Surgeon: Jhony Bhatt MD;  Location: [facility];  Service: ENT    RECTAL POLYPECTOMY      REPLACEMENT TOTAL KNEE Left     last assessed - 27Apr2015    TONSILLECTOMY      TOTAL ABDOMINAL HYSTERECTOMY      TUBAL LIGATION      US GUIDED THYROID BIOPSY  10/14/2020       Family History   Problem Relation Age of Onset    Heart disease Mother     Diabetes Mother     Heart disease Father     Coronary artery disease Father     Stroke Father         cerebrovascular accident    Heart attack Father         myocardial infarction    Sudden death Father         scd    Other Family         Back disorder    Coronary artery disease Family     Neuropathy Family     Osteoporosis Family     No Known Problems Daughter     No Known Problems Maternal Grandmother     No Known Problems Maternal Grandfather     No Known Problems Paternal Grandmother     No Known Problems Paternal Grandfather     Cancer Maternal Uncle     Breast  cancer Maternal Aunt 65    No Known Problems Son     No Known Problems Maternal Aunt     No Known Problems Maternal Aunt     No Known Problems Maternal Aunt     No Known Problems Paternal Aunt     No Known Problems Paternal Aunt     Anuerysm Neg Hx     Clotting disorder Neg Hx     Arrhythmia Neg Hx     Heart failure Neg Hx      I have reviewed and agree with the history as documented.    E-Cigarette/Vaping    E-Cigarette Use Never User      E-Cigarette/Vaping Substances    Nicotine No     THC No     CBD No     Flavoring No     Other No     Unknown No      Social History     Tobacco Use    Smoking status: Never    Smokeless tobacco: Never   Vaping Use    Vaping status: Never Used   Substance Use Topics    Alcohol use: Not Currently    Drug use: Never       Review of Systems   Constitutional:  Positive for activity change. Negative for appetite change, chills, diaphoresis, fatigue, fever and malaise/fatigue.   HENT:  Negative for congestion, ear discharge, ear pain, postnasal drip, rhinorrhea and sore throat.    Eyes:  Negative for pain, discharge, redness and itching.   Respiratory:  Negative for cough, hemoptysis, chest tightness and shortness of breath.    Cardiovascular:  Negative for chest pain, orthopnea, syncope, PND and near-syncope.   Gastrointestinal:  Negative for diarrhea, nausea and vomiting.   Endocrine: Positive for polyuria.   Genitourinary:  Positive for frequency. Negative for dysuria, hematuria and urgency.   Musculoskeletal:  Negative for back pain.   Neurological:  Negative for dizziness, weakness and numbness.   Psychiatric/Behavioral:  Negative for confusion.    All other systems reviewed and are negative.      Physical Exam  Physical Exam  Vitals and nursing note reviewed.   Constitutional:       General: She is not in acute distress.     Appearance: Normal appearance. She is obese. She is not ill-appearing, toxic-appearing or diaphoretic.   HENT:      Head: Normocephalic and atraumatic.       Right Ear: External ear normal.      Left Ear: External ear normal.   Eyes:      General:         Right eye: No discharge.         Left eye: No discharge.      Conjunctiva/sclera: Conjunctivae normal.   Cardiovascular:      Rate and Rhythm: Tachycardia present.      Heart sounds: Normal heart sounds. No murmur heard.  Pulmonary:      Effort: Pulmonary effort is normal.      Breath sounds: Normal breath sounds.   Abdominal:      Tenderness: There is no abdominal tenderness. There is no guarding or rebound.   Musculoskeletal:         General: Normal range of motion.      Cervical back: Neck supple. No rigidity or tenderness.      Right lower leg: No edema.      Left lower leg: No edema.   Lymphadenopathy:      Cervical: No cervical adenopathy.   Skin:     General: Skin is warm.      Capillary Refill: Capillary refill takes less than 2 seconds.   Neurological:      Mental Status: She is alert and oriented to person, place, and time.   Psychiatric:         Mood and Affect: Mood normal.         Behavior: Behavior normal.         Thought Content: Thought content normal.         Judgment: Judgment normal.         Vital Signs  ED Triage Vitals [01/05/24 1627]   Temperature Pulse Respirations Blood Pressure SpO2   98 °F (36.7 °C) (!) 124 18 114/69 96 %      Temp Source Heart Rate Source Patient Position - Orthostatic VS BP Location FiO2 (%)   Oral Monitor Lying Right arm --      Pain Score       --           Vitals:    01/05/24 1627 01/05/24 1945   BP: 114/69 159/99   Pulse: (!) 124 (!) 126   Patient Position - Orthostatic VS: Lying Lying         Visual Acuity      ED Medications  Medications   sodium chloride 0.9 % infusion (150 mL/hr Intravenous New Bag 1/5/24 1858)   sodium chloride 0.9 % bolus 500 mL (0 mL Intravenous Stopped 1/5/24 1816)   magnesium sulfate IVPB (premix) SOLN 1 g (1 g Intravenous New Bag 1/5/24 1858)   iohexol (OMNIPAQUE) 350 MG/ML injection (MULTI-DOSE) 70 mL (70 mL Intravenous Given 1/5/24 1830)        Diagnostic Studies  Results Reviewed       Procedure Component Value Units Date/Time    HS Troponin I 2hr [[id number]]  (Normal) Collected: 01/05/24 1901    Lab Status: Final result Specimen: Blood from Line, Venous Updated: 01/05/24 1928     hs TnI 2hr 8 ng/L      Delta 2hr hsTnI 1 ng/L     HS Troponin I 4hr [[id number]]     Lab Status: No result Specimen: Blood     TSH, 3rd generation with Free T4 reflex [[id number]]  (Normal) Collected: 01/05/24 1703    Lab Status: Final result Specimen: Blood from Arm, Right Updated: 01/05/24 1751     TSH 3RD GENERATON 3.644 uIU/mL     Protime-INR [[id number]]  (Abnormal) Collected: 01/05/24 1703    Lab Status: Final result Specimen: Blood from Arm, Right Updated: 01/05/24 1743     Protime 16.5 seconds      INR 1.26    APTT [[id number]]  (Normal) Collected: 01/05/24 1703    Lab Status: Final result Specimen: Blood from Arm, Right Updated: 01/05/24 1743     PTT 33 seconds     HS Troponin 0hr (reflex protocol) [[id number]]  (Normal) Collected: 01/05/24 1703    Lab Status: Final result Specimen: Blood from Arm, Right Updated: 01/05/24 1742     hs TnI 0hr 7 ng/L     Comprehensive metabolic panel [[id number]]  (Abnormal) Collected: 01/05/24 1703    Lab Status: Final result Specimen: Blood from Arm, Right Updated: 01/05/24 1739     Sodium 129 mmol/L      Potassium 4.1 mmol/L      Chloride 93 mmol/L      CO2 25 mmol/L      ANION GAP 11 mmol/L      BUN 19 mg/dL      Creatinine 0.85 mg/dL      Glucose 123 mg/dL      Calcium 9.0 mg/dL      AST 24 U/L      ALT 27 U/L      Alkaline Phosphatase 89 U/L      Total Protein 7.3 g/dL      Albumin 4.2 g/dL      Total Bilirubin 0.55 mg/dL      eGFR 65 ml/min/1.73sq m     Narrative:      [employer] guidelines for Chronic Kidney Disease (CKD):     Stage 1 with normal or high GFR (GFR > 90 mL/min/1.73 square meters)    Stage 2 Mild CKD (GFR = 60-89 mL/min/1.73 square meters)    Stage 3A Moderate CKD (GFR = 45-59 mL/min/1.73  square meters)    Stage 3B Moderate CKD (GFR = 30-44 mL/min/1.73 square meters)    Stage 4 Severe CKD (GFR = 15-29 mL/min/1.73 square meters)    Stage 5 End Stage CKD (GFR <15 mL/min/1.73 square meters)  Note: GFR calculation is accurate only with a steady state creatinine    Magnesium [[id number]]  (Abnormal) Collected: 01/05/24 1703    Lab Status: Final result Specimen: Blood from Arm, Right Updated: 01/05/24 1739     Magnesium 1.7 mg/dL     CBC and differential [[id number]]  (Abnormal) Collected: 01/05/24 1703    Lab Status: Final result Specimen: Blood from Arm, Right Updated: 01/05/24 1720     WBC 5.35 Thousand/uL      RBC 4.33 Million/uL      Hemoglobin 12.5 g/dL      Hematocrit 37.6 %      MCV 87 fL      MCH 28.9 pg      MCHC 33.2 g/dL      RDW 17.4 %      MPV 9.5 fL      Platelets 257 Thousands/uL      nRBC 0 /100 WBCs      Neutrophils Relative 53 %      Immat GRANS % 0 %      Lymphocytes Relative 28 %      Monocytes Relative 15 %      Eosinophils Relative 3 %      Basophils Relative 1 %      Neutrophils Absolute 2.86 Thousands/µL      Immature Grans Absolute 0.01 Thousand/uL      Lymphocytes Absolute 1.48 Thousands/µL      Monocytes Absolute 0.82 Thousand/µL      Eosinophils Absolute 0.14 Thousand/µL      Basophils Absolute 0.04 Thousands/µL                    CTA ED chest PE study   Final Result by Cristian Hunt MD (01/05 1924)   No PE.   Incidental thyroid nodule(s) for which nonemergent thyroid ultrasound is recommended.      The study was marked in EPIC for significant notification.      Workstation performed: [id number]         XR chest 1 view portable   ED Interpretation by Julie Lynn Gutzweiler, PA-C (01/05 8172)   Rotated, dual pacemaker, no acute cardiopulmonary disease                 Procedures  ECG 12 Lead Documentation Only    Date/Time: 1/5/2024 5:03 PM    Performed by: Julie Lynn Gutzweiler, PA-C  Authorized by: Julie Lynn Gutzweiler, PA-C    Indications / Diagnosis:  Palpitations  ECG reviewed  by me, the ED Provider: yes    Patient location:  ED  Previous ECG:     Previous ECG:  Compared to current    Comparison ECG info:  8/7/23    Similarity:  Changes noted    Comparison to cardiac monitor: Yes    Interpretation:     Interpretation: abnormal    Rate:     ECG rate:  121    ECG rate assessment: tachycardic    Rhythm:     Rhythm: sinus rhythm, sinus tachycardia and paced    Ectopy:     Ectopy: none    QRS:     QRS axis:  Left    QRS intervals:  Wide  Conduction:     Conduction: abnormal      Abnormal conduction: complete RBBB    ST segments:     ST segments:  Normal  T waves:     T waves: normal    Comments:      No signs of acute ischemia    Independently interpreted by me           ED Course                                             Medical Decision Making  Is 78-year-old female presents emergency room with complaints of palpitations that started yesterday.  She complains of her heart racing.  She planes of shortness of breath with activity.  She denies any chest pain.  She denies any abdominal pain.  She denies any dark tarry stools or bright red blood per rectum.  She does complain of urinary frequency in regards to her Lasix use.  She denies any dysuria, hematuria, urgency.  She denies any dependent edema.  Complains of some lightheadedness.  She has recently had a COVID-vaccine last week.  She has had no unusual change in medications.  She spoke to her cardiologist today who interrogated her pacemaker and there was no abnormalities elicited.  They told her that she was not in atrial fibrillation.    Physical exam is positive for actinic keratosis, arthritis, atrial fibrillation with rapid ventricular response, basal cell carcinoma, benign colon polyps, CHF, hypothyroidism, knee effusion, esophageal reflux, fibromyalgia, GERD, hypertension, palpitations, peroneal tendinitis right foot, right lumbar radiculopathy, thyroid cancer, thyroid nodules, trochanteric bursitis of the hip, V. Tach    Physical  exam this 78-year-old female is alert and oriented x 3.  She is in no acute distress.  Her neck is supple upon palpation without evidence of popliteal nuchal rigidity.  Her lungs are clear to auscultation.  Her heart is a tachycardic rate is regular.  There is no rhythm auscultated.  Her abdomen is soft nondistended nontender without mass or hepatosplenomegaly.  She has no peripheral edema.  She moves her upper and lower extremities freely.    Her EKG shows  a atrial and ventricular paced rhythm.  She has a right bundle branch block with left ventricular hypertrophy.    CTA of the chest was negative for pulmonary embolism.  It did mention a right pulmonary nodule.    Impression  Palpitations  Hypomagnesemia  Dyspnea with exertion  Hyponatremia  Right thyroid nodule    Plan-patient was admitted to the [facility] service for further evaluation and management of her palpitations and her tachycardia.      Amount and/or Complexity of Data Reviewed  Labs: ordered.     Details: Independently interpreted by me  Radiology: ordered and independent interpretation performed.     Details: Chest x-ray was independently interpreted by me, no acute cardiopulmonary disease.  CTA of the chest was interpreted by the radiologist.  No pulmonary embolism, right thyroid nodule  ECG/medicine tests: ordered and independent interpretation performed.     Details: Paced rhythm, right bundle branch block, left axis deviation.  Independently interpreted by me    Risk  Prescription drug management.  Decision regarding hospitalization.             Disposition  Final diagnoses:   Palpitations   Hyponatremia   Hypomagnesemia   Dyspnea   Thyroid nodule - Right lobe     Time reflects when diagnosis was documented in both MDM as applicable and the Disposition within this note       Time User Action Codes Description Comment    1/5/2024  6:13 PM Gutzweiler, Julie Add [R00.2] Palpitations     1/5/2024  6:13 PM Gutzweiler, Julie Add [E87.1] Hyponatremia      1/5/2024  6:13 PM Gutzweiler, Julie Add [E83.42] Hypomagnesemia     1/5/2024  6:15 PM Gutzweiler, Julie Add [R06.00] Dyspnea     1/5/2024  8:22 PM Gutzweiler, Julie Add [E04.1] Thyroid nodule     1/5/2024  8:22 PM Gutzweiler, Julie Modify [E04.1] Thyroid nodule Right lobe          ED Disposition       ED Disposition   Admit    Condition   Stable    Date/Time   Fri Jan 5, 2024  7:54 PM    Comment   Case was discussed with Dr Posey and the patient's admission status was agreed to be Admission Status: inpatient status to the service of Dr. Posey .               Follow-up Information    None         Patient's Medications   Discharge Prescriptions    No medications on file       No discharge procedures on file.    PDMP Review         Value Time User    PDMP Reviewed  Yes 12/19/2023  6:15 PM Naveen Monsivais MD            ED Provider  Electronically Signed by             Julie Lynn Gutzweiler, PA-C  01/05/24 2022

## 2024-01-06 VITALS
OXYGEN SATURATION: 98 % | WEIGHT: 164.46 LBS | TEMPERATURE: 98.2 F | RESPIRATION RATE: 18 BRPM | BODY MASS INDEX: 30.08 KG/M2 | SYSTOLIC BLOOD PRESSURE: 116 MMHG | DIASTOLIC BLOOD PRESSURE: 72 MMHG | HEART RATE: 78 BPM

## 2024-01-06 LAB
ALBUMIN SERPL BCP-MCNC: 3.7 G/DL (ref 3.5–5)
ALP SERPL-CCNC: 78 U/L (ref 34–104)
ALT SERPL W P-5'-P-CCNC: 24 U/L (ref 7–52)
ANION GAP SERPL CALCULATED.3IONS-SCNC: 7 MMOL/L
AST SERPL W P-5'-P-CCNC: 18 U/L (ref 13–39)
BASOPHILS # BLD AUTO: 0.05 THOUSANDS/ÂΜL (ref 0–0.1)
BASOPHILS NFR BLD AUTO: 1 % (ref 0–1)
BILIRUB SERPL-MCNC: 0.67 MG/DL (ref 0.2–1)
BUN SERPL-MCNC: 17 MG/DL (ref 5–25)
CALCIUM SERPL-MCNC: 8.7 MG/DL (ref 8.4–10.2)
CHLORIDE SERPL-SCNC: 97 MMOL/L (ref 96–108)
CO2 SERPL-SCNC: 27 MMOL/L (ref 21–32)
CREAT SERPL-MCNC: 0.79 MG/DL (ref 0.6–1.3)
EOSINOPHIL # BLD AUTO: 0.17 THOUSAND/ÂΜL (ref 0–0.61)
EOSINOPHIL NFR BLD AUTO: 4 % (ref 0–6)
ERYTHROCYTE [DISTWIDTH] IN BLOOD BY AUTOMATED COUNT: 17.6 % (ref 11.6–15.1)
GFR SERPL CREATININE-BSD FRML MDRD: 71 ML/MIN/1.73SQ M
GLUCOSE SERPL-MCNC: 124 MG/DL (ref 65–140)
HCT VFR BLD AUTO: 35.2 % (ref 34.8–46.1)
HGB BLD-MCNC: 11.4 G/DL (ref 11.5–15.4)
IMM GRANULOCYTES # BLD AUTO: 0.01 THOUSAND/UL (ref 0–0.2)
IMM GRANULOCYTES NFR BLD AUTO: 0 % (ref 0–2)
LYMPHOCYTES # BLD AUTO: 1.8 THOUSANDS/ÂΜL (ref 0.6–4.47)
LYMPHOCYTES NFR BLD AUTO: 37 % (ref 14–44)
MAGNESIUM SERPL-MCNC: 1.9 MG/DL (ref 1.9–2.7)
MCH RBC QN AUTO: 28.6 PG (ref 26.8–34.3)
MCHC RBC AUTO-ENTMCNC: 32.4 G/DL (ref 31.4–37.4)
MCV RBC AUTO: 88 FL (ref 82–98)
MONOCYTES # BLD AUTO: 0.67 THOUSAND/ÂΜL (ref 0.17–1.22)
MONOCYTES NFR BLD AUTO: 14 % (ref 4–12)
NEUTROPHILS # BLD AUTO: 2.17 THOUSANDS/ÂΜL (ref 1.85–7.62)
NEUTS SEG NFR BLD AUTO: 44 % (ref 43–75)
NRBC BLD AUTO-RTO: 0 /100 WBCS
PHOSPHATE SERPL-MCNC: 3.7 MG/DL (ref 2.3–4.1)
PLATELET # BLD AUTO: 256 THOUSANDS/UL (ref 149–390)
PMV BLD AUTO: 9.6 FL (ref 8.9–12.7)
POTASSIUM SERPL-SCNC: 3.9 MMOL/L (ref 3.5–5.3)
PROT SERPL-MCNC: 6.4 G/DL (ref 6.4–8.4)
RBC # BLD AUTO: 3.98 MILLION/UL (ref 3.81–5.12)
SODIUM SERPL-SCNC: 131 MMOL/L (ref 135–147)
T3 SERPL-MCNC: 1.2 NG/ML
T4 FREE SERPL-MCNC: 1.02 NG/DL (ref 0.61–1.12)
WBC # BLD AUTO: 4.87 THOUSAND/UL (ref 4.31–10.16)

## 2024-01-06 PROCEDURE — 80053 COMPREHEN METABOLIC PANEL: CPT

## 2024-01-06 PROCEDURE — 99222 1ST HOSP IP/OBS MODERATE 55: CPT | Performed by: INTERNAL MEDICINE

## 2024-01-06 PROCEDURE — 84100 ASSAY OF PHOSPHORUS: CPT

## 2024-01-06 PROCEDURE — 85025 COMPLETE CBC W/AUTO DIFF WBC: CPT

## 2024-01-06 PROCEDURE — 99236 HOSP IP/OBS SAME DATE HI 85: CPT | Performed by: INTERNAL MEDICINE

## 2024-01-06 PROCEDURE — 83735 ASSAY OF MAGNESIUM: CPT

## 2024-01-06 RX ORDER — MAGNESIUM SULFATE HEPTAHYDRATE 40 MG/ML
2 INJECTION, SOLUTION INTRAVENOUS ONCE
Status: DISCONTINUED | OUTPATIENT
Start: 2024-01-06 | End: 2024-01-06

## 2024-01-06 RX ORDER — DILTIAZEM HYDROCHLORIDE 120 MG/1
120 CAPSULE, COATED, EXTENDED RELEASE ORAL 2 TIMES DAILY
Qty: 90 CAPSULE | Refills: 0 | Status: SHIPPED | OUTPATIENT
Start: 2024-01-06

## 2024-01-06 RX ADMIN — LOSARTAN POTASSIUM 50 MG: 50 TABLET, FILM COATED ORAL at 08:48

## 2024-01-06 RX ADMIN — FUROSEMIDE 20 MG: 20 TABLET ORAL at 08:48

## 2024-01-06 RX ADMIN — DILTIAZEM HYDROCHLORIDE 120 MG: 120 CAPSULE, COATED, EXTENDED RELEASE ORAL at 08:48

## 2024-01-06 RX ADMIN — Medication 250 MG: at 08:48

## 2024-01-06 RX ADMIN — APIXABAN 5 MG: 5 TABLET, FILM COATED ORAL at 08:48

## 2024-01-06 RX ADMIN — EZETIMIBE 10 MG: 10 TABLET ORAL at 08:48

## 2024-01-06 RX ADMIN — OXYCODONE HYDROCHLORIDE AND ACETAMINOPHEN 500 MG: 500 TABLET ORAL at 08:48

## 2024-01-06 RX ADMIN — FLECAINIDE ACETATE 100 MG: 50 TABLET ORAL at 08:48

## 2024-01-06 RX ADMIN — METOPROLOL SUCCINATE 100 MG: 100 TABLET, EXTENDED RELEASE ORAL at 08:48

## 2024-01-06 RX ADMIN — DULOXETINE HYDROCHLORIDE 30 MG: 30 CAPSULE, DELAYED RELEASE ORAL at 08:48

## 2024-01-06 NOTE — ASSESSMENT & PLAN NOTE
- Heart palpitations beginning morning of 1/5, cardiologist interrogated pacemaker. Pt in sinus tach in 120's and was sent to ED  - Continues to be in sinus tach with AV pacing  - Subjective chills the day prior to admission with COVID vaccination administration on Tuesday   - Had 3-4 minute run of vtach when brought to the floor and was asymptomatic     Plan:   - T3, free T4 normal  - Cardiology consult, clear for DC  - f/u in EP clinic  - Continue to monitor for symptoms; patient asymptomatic when brought up to the floor   - Add on PRN cardizem 120 mg once daily additional to BID dose

## 2024-01-06 NOTE — H&P
"[facility]  H&P  Name: Farnaz Martin 78 y.o. female I MRN: [id number]  Unit/Bed#: S -01 I Date of Admission: 1/5/2024   Date of Service: 1/5/2024 I Hospital Day: 0      Assessment/Plan   * Sinus tachycardia  Assessment & Plan  - Heart palpitations beginning morning of 1/5, cardiologist interrogated pacemaker. Pt in sinus tach in 120's and was sent to ED  - Continues to be in sinus tach with AV pacing  - Subjective chills the day prior to admission with COVID vaccination administration on Tuesday   - Had 3-4 minute run of vtach when brought to the floor and was asymptomatic     Plan:   - T3, free T4 follow up   - Cardiology consult   - Continue to monitor for symptoms; patient asymptomatic when brought up to the floor     Hyponatremia  Assessment & Plan  - Started Lasix 20mg daily; was previously taking 20mg prn   - Likely due to consistent diuresis     Plan:   - Fluid restriction   - Repeat BMP in AM    Type 2 diabetes mellitus with chronic kidney disease, without long-term current use of insulin, unspecified CKD stage (HCC)  Assessment & Plan  Lab Results   Component Value Date    HGBA1C 6.6 (H) 07/03/2023       No results for input(s): \"POCGLU\" in the last 72 hours.    Blood Sugar Average: Last 72 hrs:    Not on any medications outpatient   Low threshold to start SSI    Malignant neoplasm of thyroid gland (HCC)  Assessment & Plan  - S/p lobectomy   - TSH within normal limits  - Nodule seen on CT scan    Plan:   - Follow up T3 and free T4  - Non- urgent follow up with thyroid ultrasound for nodule     Chronic diastolic CHF (congestive heart failure) (HCC)  Assessment & Plan  Wt Readings from Last 3 Encounters:   01/04/24 71.7 kg (158 lb)   12/19/23 72.6 kg (160 lb)   12/05/23 71.7 kg (158 lb)         - Lasix 20mg prn   - Last taken day of admission 1/5  - Last echo 12/23 with EF of 55%    Plan:   - Continue to monitor fluid status   - Metoprolol 100mg BID   - Lasix 20mg " daily      Peripheral neuropathy  Assessment & Plan  - Continue gabapentin 300mg daily    Hyperlipidemia  Assessment & Plan  - Continue Zetia 10mg    Essential hypertension  Assessment & Plan  - Continue Losartan 50mg BID    A-fib (HCC)  Assessment & Plan  -  3/15/21 Sp MDT dual chamber PPM insertion    - 8/07/23TEE DCCV with a single biphasic shock 300J which was successful to NSR   - Follows with Dr. Gomez     Plan:   - Continue Cardizem 120mg BID  - Cont Metoprolol 100mg BID  - Cont Flecainide   - Anticoagulated with Eliquis 5mg BID  - Monitor on tele            VTE Pharmacologic Prophylaxis:   High Risk (Score >/= 5) - Pharmacological DVT Prophylaxis Ordered: apixaban (Eliquis). Sequential Compression Devices Ordered.  Code Status: Level 3 - DNAR and DNI   Discussion with family: Updated  (daughter) via phone.    Anticipated Length of Stay: Patient will be admitted on an inpatient basis with an anticipated length of stay of greater than 2 midnights secondary to palpitations.    Chief Complaint: Palpitations    History of Present Illness:  Farnaz Martin is a 78 y.o. female with a PMH of A-fib with RVR with dual chamber pacemaker on Xarelto, basal cell carcinoma, CHF, hypothyroidism, GERD, fibromyalgia, hypertension, lumbar radiculopathy, thyroid cancer who presents with palpitations starting yesterday with associated shortness of breath.  She called her cardiologist today who interrogated her pacemaker and found to be in sinus tach in the 120 range and was sent to the ED for further workup.     ED EKG showed ventricular rate of 121 with atrial and ventricular pacing. Known right bundle with LVH. Chest x-ray did not demonstrate any acute cardiopulmonary disease.  Pro time is 16.5 with an INR of 1.26.  Mag was 1.7 and was repleted.  Sodium 129.  Tsh 3.6. CTA chest was negative for PE but did mention right thyroid nodule.  Received IV fluids in the ED.  Was started on Lasix daily (rather than prn)  yesterday. Admitted for further workup of sinus tachycardia.    Review of Systems:  Review of Systems   Constitutional:  Positive for chills. Negative for activity change, appetite change, fatigue and fever.   HENT:  Negative for congestion, postnasal drip, sinus pressure and sore throat.    Respiratory:  Positive for cough. Negative for choking and chest tightness.         Dyspnea on exertion    Cardiovascular:  Positive for palpitations and leg swelling. Negative for chest pain.   Gastrointestinal:  Negative for abdominal distention, constipation, diarrhea, nausea and vomiting.   Genitourinary:  Negative for decreased urine volume, dysuria, frequency and urgency.   Musculoskeletal: Negative.    Neurological:  Negative for dizziness, syncope, weakness and light-headedness.       Past Medical and Surgical History:   Past Medical History:   Diagnosis Date    Actinic keratosis     last assessed - [id number]    Arthritis     Atrial fibrillation with rapid ventricular response (HCC)     last assessed - [id number]    Basal cell carcinoma     Benign colon polyp     last assessed - [id number]    CHF (congestive heart failure) ([facility]) 06/2022    Disease of thyroid gland     Effusion of knee joint right     Resolved - [id number]    Esophageal reflux     Fibromyalgia     last assessed - [id number]    Fibromyalgia, primary     GERD (gastroesophageal reflux disease)     Hypertension     Palpitations     last assessed - [id number]    Peroneal tendonitis, right     last assessed - [id number]    Right lumbar radiculopathy 03/17/2016    Thyroid cancer ([facility])     Thyroid nodule     Trochanteric bursitis of left hip 03/09/2018       Past Surgical History:   Procedure Laterality Date    CARDIAC ELECTROPHYSIOLOGY STUDY AND ABLATION  08/2022    CATARACT EXTRACTION Bilateral     COLONOSCOPY  03/2018    COLONOSCOPY W/ POLYPECTOMY  2021    repeat in 5 years    EYE SURGERY      HYSTERECTOMY      JOINT REPLACEMENT Left     knee    VT NEUROPLASTY  &/TRANSPOS MEDIAN NRV CARPAL TUNNE Right 11/14/2019    Procedure: RELEASE CARPAL TUNNEL;  Surgeon: Onesimo Tate MD;  Location: [facility];  Service: Orthopedics    SD TOTAL THYROID LOBECTOMY UNI W/WO ISTHMUSECTOMY Left 12/16/2020    Procedure: Left THYROID LOBECTOMY;  Surgeon: Jhony Bhatt MD;  Location: [facility];  Service: ENT    RECTAL POLYPECTOMY      REPLACEMENT TOTAL KNEE Left     last assessed - [id number]    TONSILLECTOMY      TOTAL ABDOMINAL HYSTERECTOMY      TUBAL LIGATION      US GUIDED THYROID BIOPSY  10/14/2020       Meds/Allergies:  Prior to Admission medications    Medication Sig Start Date End Date Taking? Authorizing Provider   apixaban (ELIQUIS) 5 mg Take 1 tablet (5 mg total) by mouth 2 (two) times a day 11/1/23   Nir Cunningham DO   Arthritis Pain Relief 650 MG CR tablet Take 650 mg by mouth 3 (three) times a day 8/1/23   Historical Provider, MD   ascorbic acid (VITAMIN C) 500 mg tablet Take 500 mg by mouth daily     Historical Provider, MD   Cetirizine HCl (ZyrTEC Allergy) 10 MG CAPS Take 10 mg by mouth in the morning    Historical Provider, MD   Cholecalciferol 50 MCG (2000 UT) CAPS Take 2,000 Units by mouth 2 (two) times a day    Historical Provider, MD   Chromium Picolinate (CHROMIUM PICOLATE PO) Take 1 tablet by mouth daily    Historical Provider, MD   diltiazem (CARDIZEM CD) 120 mg 24 hr capsule Take 1 capsule (120 mg total) by mouth 2 (two) times a day 11/30/23   Nir Cunningham DO   DULoxetine (CYMBALTA) 30 mg delayed release capsule take 1 capsule by mouth once daily 12/15/23   Naveen Monsivais MD   ezetimibe (ZETIA) 10 mg tablet take 1 tablet by mouth once daily 7/10/23   Naveen Monsivais MD   flecainide (TAMBOCOR) 100 mg tablet Take 1 tablet (100 mg total) by mouth 2 (two) times a day 8/28/23   JENNY Hardy   furosemide (LASIX) 20 mg tablet Take 1 tablet (20 mg total) by mouth daily take 2 tablet by mouth daily if needed for shortness of breath WEIGHT GAIN 3 LBS IN  1 DAY OR LOWER LEG SWELLING 1/4/24   Avelino Guerra MD   gabapentin (NEURONTIN) 300 mg capsule Take 1 capsule (300 mg total) by mouth daily at bedtime 8/31/23   Naveen Monsivais MD   ipratropium (ATROVENT) 0.03 % nasal spray 2 sprays into each nostril 3 (three) times a day Begin once per day, then progress to twice per day. Progress to three times a day as tolerated. 2/15/22   Lacie Rodriguez PA-C   lidocaine (XYLOCAINE) 5 % ointment Apply topically as needed for mild pain 1/3/22   JENNY Paez   losartan (COZAAR) 50 mg tablet Take 1 tablet (50 mg total) by mouth 2 (two) times a day 2/14/23   Nir Cunningham DO   metoprolol succinate (TOPROL-XL) 25 mg 24 hr tablet TAKE 4 TABLETS BY MOUTH EVERY 12 HOURS 12/15/23   Steve Tripathi MD   rOPINIRole (REQUIP) 1 mg tablet Take 2 tablets (2 mg total) by mouth daily at bedtime 11/10/23 2/8/24  Naveen Monsivais MD   Saccharomyces boulardii (Probiotic) 250 MG CAPS Take 1 capsule by mouth daily    Historical Provider, MD   TURMERIC PO Take 1 capsule by mouth 2 (two) times a day    Historical Provider, MD   zolpidem (AMBIEN) 10 mg tablet Take 1 tablet (10 mg total) by mouth daily at bedtime as needed for sleep 8/31/23   Naveen Monsivais MD     I have reviewed home medications with patient personally.    Allergies:   Allergies   Allergen Reactions    Sotalol Other (See Comments)     Prolonged QTc leading to torsades de pointes     Penicillins Other (See Comments)     As a child calcium deposit in the arm     Ace Inhibitors GI Intolerance     Did feel well on it    Omeprazole Abdominal Pain, Rash and Vomiting     stomach pain, vomiting, rash       Social History:  Marital Status:    Patient Pre-hospital Living Situation: Home  Patient Pre-hospital Level of Mobility: walks  Patient Pre-hospital Diet Restrictions: did not assess  Substance Use History:   Social History     Substance and Sexual Activity   Alcohol Use Not Currently     Social History     Tobacco Use    Smoking Status Never   Smokeless Tobacco Never     Social History     Substance and Sexual Activity   Drug Use Never       Family History:  Family History   Problem Relation Age of Onset    Heart disease Mother     Diabetes Mother     Heart disease Father     Coronary artery disease Father     Stroke Father         cerebrovascular accident    Heart attack Father         myocardial infarction    Sudden death Father         scd    Other Family         Back disorder    Coronary artery disease Family     Neuropathy Family     Osteoporosis Family     No Known Problems Daughter     No Known Problems Maternal Grandmother     No Known Problems Maternal Grandfather     No Known Problems Paternal Grandmother     No Known Problems Paternal Grandfather     Cancer Maternal Uncle     Breast cancer Maternal Aunt 65    No Known Problems Son     No Known Problems Maternal Aunt     No Known Problems Maternal Aunt     No Known Problems Maternal Aunt     No Known Problems Paternal Aunt     No Known Problems Paternal Aunt     Anuerysm Neg Hx     Clotting disorder Neg Hx     Arrhythmia Neg Hx     Heart failure Neg Hx        Physical Exam:     Vitals:   Blood Pressure: 133/100 (01/05/24 2229)  Pulse: (!) 129 (01/05/24 2117)  Temperature: 97.9 °F (36.6 °C) (01/05/24 2117)  Temp Source: Oral (01/05/24 1627)  Respirations: 14 (01/05/24 2117)  SpO2: 98 % (01/05/24 2117)    Physical Exam  Constitutional:       General: She is not in acute distress.     Appearance: Normal appearance. She is not ill-appearing.   HENT:      Head: Normocephalic and atraumatic.      Mouth/Throat:      Mouth: Mucous membranes are moist.      Pharynx: Oropharynx is clear.   Eyes:      Extraocular Movements: Extraocular movements intact.      Conjunctiva/sclera: Conjunctivae normal.   Cardiovascular:      Rate and Rhythm: Regular rhythm. Tachycardia present.      Pulses: Normal pulses.      Heart sounds: Murmur heard.   Pulmonary:      Effort: Pulmonary effort is  normal. No respiratory distress.      Breath sounds: Normal breath sounds. No wheezing or rales.   Abdominal:      General: Bowel sounds are normal. There is no distension.      Palpations: Abdomen is soft.      Tenderness: There is no abdominal tenderness. There is no guarding.   Musculoskeletal:      Right lower leg: Edema present.      Left lower leg: Edema present.      Comments: Trace bilateral edema   Skin:     General: Skin is warm and dry.      Capillary Refill: Capillary refill takes less than 2 seconds.   Neurological:      General: No focal deficit present.      Mental Status: She is alert and oriented to person, place, and time. Mental status is at baseline.   Psychiatric:         Mood and Affect: Mood normal.         Behavior: Behavior normal.          Additional Data:     Lab Results:  Results from last 7 days   Lab Units 01/05/24  1703   WBC Thousand/uL 5.35   HEMOGLOBIN g/dL 12.5   HEMATOCRIT % 37.6   PLATELETS Thousands/uL 257   NEUTROS PCT % 53   LYMPHS PCT % 28   MONOS PCT % 15*   EOS PCT % 3     Results from last 7 days   Lab Units 01/05/24  1703   SODIUM mmol/L 129*   POTASSIUM mmol/L 4.1   CHLORIDE mmol/L 93*   CO2 mmol/L 25   BUN mg/dL 19   CREATININE mg/dL 0.85   ANION GAP mmol/L 11   CALCIUM mg/dL 9.0   ALBUMIN g/dL 4.2   TOTAL BILIRUBIN mg/dL 0.55   ALK PHOS U/L 89   ALT U/L 27   AST U/L 24   GLUCOSE RANDOM mg/dL 123     Results from last 7 days   Lab Units 01/05/24  1703   INR  1.26*                   Lines/Drains:  Invasive Devices       Peripheral Intravenous Line  Duration             Peripheral IV 01/05/24 Proximal;Right;Ventral (anterior) Forearm <1 day                        Imaging: Reviewed radiology reports from this admission including: chest xray and chest CT scan and Personally reviewed the following imaging: chest xray and chest CT scan  CTA ED chest PE study   Final Result by Cristian Hunt MD (01/05 1924)   No PE.   Incidental thyroid nodule(s) for which nonemergent thyroid  ultrasound is recommended.      The study was marked in EPIC for significant notification.      Workstation performed: [id number]         XR chest 1 view portable   ED Interpretation by Julie Lynn Gutzweiler, PA-C (01/05 1742)   Rotated, dual pacemaker, no acute cardiopulmonary disease          EKG and Other Studies Reviewed on Admission:   EKG: Sinus Tachycardia. .    ** Please Note: This note has been constructed using a voice recognition system. **

## 2024-01-06 NOTE — QUICK NOTE
"Post-Admission Note:  This is a 78-year-old female with dual-chamber pacemaker with history of A-fib s/p cardioversion presenting for palpitations with associated weakness and dyspnea.      In addition to the HPI gathered by Dr. Boyer, she complains of chronic cough for >6 months and that this has reoccurred for several years. Complains that cough has gotten \"deeper\".    T3 and T4 normal levels  Troponin normal  Sodium uptrending which is reassuring  Mag and Phos normal  Dilutional changes reflected on most recent CBC  Most recent echo 12/23 with EF of 55% but some diastolic dysfunction and elevated RSVP.  Ordered incentive spirometry    Await cardiology evaluation  "

## 2024-01-06 NOTE — DISCHARGE SUMMARY
"[facility]  Discharge- Farnaz Martin 1945, 78 y.o. female MRN: [id number]  Unit/Bed#: S -01 Encounter: [id number]  Primary Care Provider: Naveen Monsivais MD   Date and time admitted to hospital: 1/5/2024  4:24 PM    * Sinus tachycardia  Assessment & Plan  - Heart palpitations beginning morning of 1/5, cardiologist interrogated pacemaker. Pt in sinus tach in 120's and was sent to ED  - Continues to be in sinus tach with AV pacing  - Subjective chills the day prior to admission with COVID vaccination administration on Tuesday   - Had 3-4 minute run of vtach when brought to the floor and was asymptomatic     Plan:   - T3, free T4 normal  - Cardiology consult, clear for DC  - f/u in EP clinic  - Continue to monitor for symptoms; patient asymptomatic when brought up to the floor   - Add on PRN cardizem 120 mg once daily additional to BID dose    A-fib (HCC)  Assessment & Plan  -  3/15/21 Sp MDT dual chamber PPM insertion    - 8/07/23TEE DCCV with a single biphasic shock 300J which was successful to NSR   - Follows with Dr. Gomez     Plan:   - Continue Cardizem 120mg BID but add on 120mg PRN additional once daily for recurrent aflutter  - Cont Metoprolol 100mg BID  - Cont Flecainide   - Anticoagulated with Eliquis 5mg BID  - Monitor on tele     Anemia  Assessment & Plan  Likely dilutional    Repeat cbc 1 week    Type 2 diabetes mellitus with chronic kidney disease, without long-term current use of insulin, unspecified CKD stage (HCC)  Assessment & Plan  Lab Results   Component Value Date    HGBA1C 6.6 (H) 07/03/2023       No results for input(s): \"POCGLU\" in the last 72 hours.    Blood Sugar Average: Last 72 hrs:    Not on any medications outpatient   Low threshold to start SSI    Hyponatremia  Assessment & Plan  - Started Lasix 20mg daily; was previously taking 20mg prn   - Likely due to consistent diuresis v CHF    129->131    Plan:   - Repeat BMP in 1 week    Malignant " neoplasm of thyroid gland (HCC)  Assessment & Plan  - S/p lobectomy   - TSH within normal limits  - Nodule seen on CT scan    Plan:   - Follow up T3 and free T4  - Non- urgent follow up with thyroid ultrasound for nodule     Chronic diastolic CHF (congestive heart failure) (HCC)  Assessment & Plan  Wt Readings from Last 3 Encounters:   01/06/24 74.6 kg (164 lb 7.4 oz)   01/04/24 71.7 kg (158 lb)   12/19/23 72.6 kg (160 lb)         - Lasix 20mg prn   - Last taken day of admission 1/5  - Last echo 12/23 with EF of 55%    Plan:   - Continue to monitor fluid status   - Metoprolol 100mg BID   - Lasix 20mg daily      Peripheral neuropathy  Assessment & Plan  - Continue gabapentin 300mg daily    Hyperlipidemia  Assessment & Plan  - Continue Zetia 10mg    Essential hypertension  Assessment & Plan  - Continue Losartan 50mg BID        Medical Problems       Resolved Problems  Date Reviewed: 1/6/2024   None       Discharging Resident: Xavi Browne MD  Discharging Attending: Ebony La*  PCP: Naveen Monsivais MD  Admission Date:   Admission Orders (From admission, onward)       Ordered        01/05/24 1956  INPATIENT ADMISSION  Once                          Discharge Date: 01/06/24    Consultations During Hospital Stay:  Cardiology    Procedures Performed:   None    Significant Findings / Test Results:   Device interrogation 1/5/2024; sinus tach, AP 91%,  7% normal device function  Troponin negative    CTA ED chest PE study    Result Date: 1/5/2024  Impression: No PE. Incidental thyroid nodule(s) for which nonemergent thyroid ultrasound is recommended. The study was marked in EPIC for significant notification. Workstation performed: [id number]       XR chest 1 view portable    Result Date: 1/6/2024  Impression No acute cardiopulmonary disease. Resident: LJ MOURA I, the attending radiologist, have reviewed the images and agree with the final report above. Workstation performed: [id number]     XR chest  pa & lateral    Result Date: 1/4/2024  Impression No acute cardiopulmonary disease. Cardiomegaly with dual-chamber pacemaker noted. No significant change from prior study of 8//2023. Electronically signed: 01/04/2024 05:45 PM Iftikhar Lebron MD         Incidental Findings:   Thyroid nodules, known  I reviewed the above mentioned incidental findings with the patient and/or family and they expressed understanding.    Test Results Pending at Discharge (will require follow up):  None     Outpatient Tests Requested:  CBC and BMP in 1 week    Complications:  None    Reason for Admission: Palpitations    Hospital Course:   Farnaz Martin is a 78 y.o. female patient who originally presented to the hospital on 1/5/2024 due to palpitations starting yesterday with associated shortness of breath. Device interrogation 1/5/2024; sinus tach, AP 91%,  7% normal device function. Evaluated by cardiology and cleared for discharge. Sinus tachycardia that is fluid responsive. She can use an additional once per day Cardizem  mg as needed for tachycardia.       Please see above list of diagnoses and related plan for additional information.     Condition at Discharge: stable    Discharge Day Visit / Exam:   Subjective:  Non sustained VT on tele. No complaints of chest pain or shortness of breath this AM. Agreeable to discharge.  Vitals: Blood Pressure: 156/87 (01/06/24 0800)  Pulse: 65 (01/06/24 0800)  Temperature: 98.5 °F (36.9 °C) (01/06/24 0800)  Temp Source: Oral (01/05/24 1627)  Respirations: 16 (01/06/24 0800)  Weight - Scale: 74.6 kg (164 lb 7.4 oz) (01/06/24 0600)  SpO2: 94 % (01/06/24 0800)  Exam:   Physical Exam  Vitals reviewed.   Constitutional:       General: She is not in acute distress.     Appearance: She is not ill-appearing or toxic-appearing.   HENT:      Head: Normocephalic.      Mouth/Throat:      Mouth: Mucous membranes are moist.   Eyes:      Conjunctiva/sclera: Conjunctivae normal.   Cardiovascular:       Rate and Rhythm: Normal rate. Rhythm irregular.      Heart sounds: Murmur heard.   Pulmonary:      Breath sounds: No wheezing, rhonchi or rales.   Abdominal:      Tenderness: There is no abdominal tenderness.   Musculoskeletal:      Right lower leg: No edema.      Left lower leg: No edema.   Skin:     Findings: No rash.   Neurological:      Mental Status: She is alert and oriented to person, place, and time.   Psychiatric:         Mood and Affect: Mood normal.         Behavior: Behavior normal.          Discussion with Family: Updated  (daughter) at bedside.    Discharge instructions/Information to patient and family:   See after visit summary for information provided to patient and family.      Provisions for Follow-Up Care:  See after visit summary for information related to follow-up care and any pertinent home health orders.      Mobility at time of Discharge:   JH-HLM Achieved: 8: Walk 250 feet ot more  HLM Goal achieved. Continue to encourage appropriate mobility.     Disposition:   Home    Planned Readmission: No    Discharge Medications:  See after visit summary for reconciled discharge medications provided to patient and/or family.      **Please Note: This note may have been constructed using a voice recognition system**

## 2024-01-06 NOTE — ASSESSMENT & PLAN NOTE
"Lab Results   Component Value Date    HGBA1C 6.6 (H) 07/03/2023       No results for input(s): \"POCGLU\" in the last 72 hours.    Blood Sugar Average: Last 72 hrs:    Not on any medications outpatient   Low threshold to start SSI  "

## 2024-01-06 NOTE — ASSESSMENT & PLAN NOTE
- Heart palpitations beginning morning of 1/5, cardiologist interrogated pacemaker. Pt in sinus tach in 120's and was sent to ED  - Continues to be in sinus tach with AV pacing  - Subjective chills the day prior to admission with COVID vaccination administration on Tuesday   - Had 3-4 minute run of vtach when brought to the floor and was asymptomatic     Plan:   - T3, free T4 follow up   - Cardiology consult   - Continue to monitor for symptoms; patient asymptomatic when brought up to the floor

## 2024-01-06 NOTE — ASSESSMENT & PLAN NOTE
-  3/15/21 Sp MDT dual chamber PPM insertion    - 8/07/23TEE DCCV with a single biphasic shock 300J which was successful to NSR   - Follows with Dr. Gomez     Plan:   - Continue Cardizem 120mg BID but add on 120mg PRN additional once daily for recurrent aflutter  - Cont Metoprolol 100mg BID  - Cont Flecainide   - Anticoagulated with Eliquis 5mg BID  - Monitor on tele

## 2024-01-06 NOTE — ASSESSMENT & PLAN NOTE
"Lab Results   Component Value Date    HGBA1C 6.6 (H) 07/03/2023       No results for input(s): \"POCGLU\" in the last 72 hours.    Blood Sugar Average: Last 72 hrs:  ?  Not on any medications outpatient   Low threshold to start SSI  "

## 2024-01-06 NOTE — CONSULTS
Consultation - Cardiology Team One  Farnaz Martin 78 y.o. female MRN: [id number]  Unit/Bed#: S -01 Encounter: [id number]    Inpatient consult to Cardiology  Consult performed by: Katya Child PA-C  Consult ordered by: Diane Boyer DO          Physician Requesting Consult: Ebony La*  Reason for Consult / Principal Problem: Sinus tachycardia    Assessment:    1.  Sinus tachycardia: Noted on device interrogation and telemetry with some episodes of NSVT.  Patient reported feeling tired after receiving COVID booster 2 days prior.    2.  NSVT: Multiple episodes noted on telemetry.  K+ 4.1, mag 1.7 POA.  Mag 1.9 today.  On BB and CCB at baseline.    3.  Hypomagnesemia: Mag 1.7 POA.  Received repletion with improvement to 1.9 today.      4.  Chronic HFpEF: Volume status stable on Lasix 20 mg daily  Limited echocardiogram 12/5/2023: EF 55%, no WMA, abnormal diastolic function, normal RV function, moderate MR, moderate TR with PASP of 53 mmHg.  When compared to SHANNAN from 8/2023 EF was 65% with no evidence of MR with similar moderate TR.    5.  SSS: s/p MDT DC PPM implanted 3/2021.  Device interrogation yesterday revealed sinus tachycardia, AP 91%,  7% with normal device function.    6.  PAF: With history of ablation x 2 and SHANNAN/CV 8/2023.  Maintained on diltiazem 120 mg BID and flecainide 100 mg BID.  [id number] 6: Anticoagulated on Eliquis 5 mg BID    7.  Essential hypertension: Average /90 on diltiazem 120 mg BID and Toprol- mg BID.    8.  Hyperlipidemia: Lipid panel 7/2023 , TG 68, HDL 53, LDL 96 on Zetia 10 mg daily.    9.  Type II DM: Hemoglobin A1c 6.6 in 7/2023.  Management per primary team.    Plan/Recommendations:  Nonspecific sinus tach improved with fluid resuscitation  Continue current medications  No further recommendations from a cardiac standpoint  Patient stable for discharge  Follow-up in the office as  scheduled  _____________________________________________________________________    CC: Palpitations      History of Present Illness   HPI: Farnaz Martin is a 78 y.o. year old female who has PAF s/p 2 AF ablations with SHANNAN/CV 8/2023, chronic HFpEF, SSS s/p MDT DC PPM implanted 3/2021, essential hypertension, hyperlipidemia, type II DM who follows with cardiologist Dr. Cunningham.  Patient presented to the emergency room at [facility] on 1/5/2024 with palpitations occurring all day.  Of note patient did receive her COVID booster 2 days prior.  Device interrogation (detailed below) was reviewed by Dr. Cunningahm revealed normal device function with sinus tachycardia.  Patient was advised to go to the ED as something else is likely driving tachycardia.  On arrival to the ED patient was tachycardic in the 120s with stable BP and oxygen saturation 96% on room air.  EKG revealed ventricular paced rhythm with rates in the 130s.  CXR revealed no acute cardiopulmonary disease per personal review.  CTA chest was negative for PE.  Labs revealed sodium 129 otherwise stable CMP, mag 1.7, negative troponin x 3, stable CBC, stable TFTs.  Cardiology has been consulted for further evaluation and management of sinus tachycardia.    Home medication regimen includes Eliquis 5 mg BID, diltiazem 120 mg BID, Zetia 10 mg daily, flecainide 100 mg BID, Toprol- mg BID, Lasix 20 mg daily, losartan 50 mg BID.    Patient resting in bed during consultation and denies any current chest pain, shortness of breath or palpitations.  She feels tired.      Device interrogation 1/5/2024: MDT DC PPM (MRI conditional).  AP 91%,  7%, no significant high rate episodes.  Normal device function.    Limited echocardiogram 12/5/2023: EF 55%, no WMA, abnormal diastolic function, normal RV function, moderate MR, moderate TR with PASP of 53 mmHg.  When compared to SHANNAN from 8/2023 EF was 65% with no evidence of MR with similar moderate  TR.    Nuclear stress test 10/5/2022: No evidence of ischemia, EF 70%.    EKG reviewed personally: 1/5/2024-V paced rhythm at 130 bpm.  When compared to the EKG from 8/7/2023 atrial paced rhythm at 66 bpm was noted.    Telemetry reviewed personally: Sinus tachycardia with ventricular paced rhythm with multiple runs of NSVT.  Currently ventricular paced in the 60s        Review of Systems   Constitutional: Positive for malaise/fatigue. Negative for chills.   Cardiovascular:  Negative for chest pain, dyspnea on exertion, leg swelling, near-syncope, orthopnea, palpitations, paroxysmal nocturnal dyspnea and syncope.   Respiratory: Negative.  Negative for cough, shortness of breath and wheezing.    Endocrine: Negative.    Hematologic/Lymphatic: Negative.    Skin: Negative.    Musculoskeletal: Negative.    Gastrointestinal: Negative.  Negative for diarrhea, nausea and vomiting.   Neurological:  Negative for dizziness, light-headedness and weakness.   Psychiatric/Behavioral: Negative.  Negative for altered mental status.    All other systems reviewed and are negative.    Historical Information   Past Medical History:   Diagnosis Date    Actinic keratosis     last assessed - 06Jun2014    Arthritis     Atrial fibrillation with rapid ventricular response (HCC)     last assessed - 26Apr2016    Basal cell carcinoma     Benign colon polyp     last assessed - 27Apr2015    CHF (congestive heart failure) ([facility]) 06/2022    Disease of thyroid gland     Effusion of knee joint right     Resolved - [id number]    Esophageal reflux     Fibromyalgia     last assessed - [id number]    Fibromyalgia, primary     GERD (gastroesophageal reflux disease)     Hypertension     Palpitations     last assessed - 30Apr2013    Peroneal tendonitis, right     last assessed - [id number]    Right lumbar radiculopathy 03/17/2016    Thyroid cancer (HCC)     Thyroid nodule     Trochanteric bursitis of left hip 03/09/2018     Past Surgical History:   Procedure  Laterality Date    CARDIAC ELECTROPHYSIOLOGY STUDY AND ABLATION  08/2022    CATARACT EXTRACTION Bilateral     COLONOSCOPY  03/2018    COLONOSCOPY W/ POLYPECTOMY  2021    repeat in 5 years    EYE SURGERY      HYSTERECTOMY      JOINT REPLACEMENT Left     knee    MT NEUROPLASTY &/TRANSPOS MEDIAN NRV CARPAL TUNNE Right 11/14/2019    Procedure: RELEASE CARPAL TUNNEL;  Surgeon: Onesimo Tate MD;  Location: [facility];  Service: Orthopedics    MT TOTAL THYROID LOBECTOMY UNI W/WO ISTHMUSECTOMY Left 12/16/2020    Procedure: Left THYROID LOBECTOMY;  Surgeon: Jhony Bhatt MD;  Location: [facility];  Service: ENT    RECTAL POLYPECTOMY      REPLACEMENT TOTAL KNEE Left     last assessed - [id number]    TONSILLECTOMY      TOTAL ABDOMINAL HYSTERECTOMY      TUBAL LIGATION      US GUIDED THYROID BIOPSY  10/14/2020     Social History     Substance and Sexual Activity   Alcohol Use Not Currently     Social History     Substance and Sexual Activity   Drug Use Never     Social History     Tobacco Use   Smoking Status Never   Smokeless Tobacco Never     Family History:   Family History   Problem Relation Age of Onset    Heart disease Mother     Diabetes Mother     Heart disease Father     Coronary artery disease Father     Stroke Father         cerebrovascular accident    Heart attack Father         myocardial infarction    Sudden death Father         scd    Other Family         Back disorder    Coronary artery disease Family     Neuropathy Family     Osteoporosis Family     No Known Problems Daughter     No Known Problems Maternal Grandmother     No Known Problems Maternal Grandfather     No Known Problems Paternal Grandmother     No Known Problems Paternal Grandfather     Cancer Maternal Uncle     Breast cancer Maternal Aunt 65    No Known Problems Son     No Known Problems Maternal Aunt     No Known Problems Maternal Aunt     No Known Problems Maternal Aunt     No Known Problems Paternal Aunt     No Known Problems Paternal Aunt      Anuerysm Neg Hx     Clotting disorder Neg Hx     Arrhythmia Neg Hx     Heart failure Neg Hx        Meds/Allergies   all current active meds have been reviewed, current meds:   Current Facility-Administered Medications   Medication Dose Route Frequency    acetaminophen (TYLENOL) tablet 650 mg  650 mg Oral Q6H PRN    apixaban (ELIQUIS) tablet 5 mg  5 mg Oral BID    ascorbic acid (VITAMIN C) tablet 500 mg  500 mg Oral Daily    diltiazem (CARDIZEM CD) 24 hr capsule 120 mg  120 mg Oral BID    DULoxetine (CYMBALTA) delayed release capsule 30 mg  30 mg Oral Daily    ezetimibe (ZETIA) tablet 10 mg  10 mg Oral Daily    flecainide (TAMBOCOR) tablet 100 mg  100 mg Oral BID    furosemide (LASIX) tablet 20 mg  20 mg Oral Daily    gabapentin (NEURONTIN) capsule 300 mg  300 mg Oral HS    losartan (COZAAR) tablet 50 mg  50 mg Oral BID    metoprolol succinate (TOPROL-XL) 24 hr tablet 100 mg  100 mg Oral Q12H JOAQUINA    rOPINIRole (REQUIP) tablet 2 mg  2 mg Oral HS    saccharomyces boulardii (FLORASTOR) capsule 250 mg  250 mg Oral Daily    zolpidem (AMBIEN) tablet 10 mg  10 mg Oral HS PRN   , and PTA meds:   Prior to Admission Medications   Prescriptions Last Dose Informant Patient Reported? Taking?   Arthritis Pain Relief 650 MG CR tablet Not Taking Self Yes No   Sig: Take 650 mg by mouth 3 (three) times a day   Patient not taking: Reported on 1/5/2024   Cetirizine HCl (ZyrTEC Allergy) 10 MG CAPS 1/5/2024 Self Yes Yes   Sig: Take 10 mg by mouth in the morning   Cholecalciferol 50 MCG (2000 UT) CAPS 1/5/2024 Self Yes Yes   Sig: Take 2,000 Units by mouth 2 (two) times a day   Chromium Picolinate (CHROMIUM PICOLATE PO) 1/5/2024 Self Yes Yes   Sig: Take 1 tablet by mouth daily   DULoxetine (CYMBALTA) 30 mg delayed release capsule 1/5/2024 Self No Yes   Sig: take 1 capsule by mouth once daily   Saccharomyces boulardii (Probiotic) 250 MG CAPS 1/5/2024 Self Yes Yes   Sig: Take 1 capsule by mouth daily   TURMERIC PO 1/5/2024 Self Yes Yes   Sig:  Take 1 capsule by mouth 2 (two) times a day   apixaban (ELIQUIS) 5 mg 2024 Self No Yes   Sig: Take 1 tablet (5 mg total) by mouth 2 (two) times a day   ascorbic acid (VITAMIN C) 500 mg tablet 2024 Self Yes Yes   Sig: Take 500 mg by mouth daily    diltiazem (CARDIZEM CD) 120 mg 24 hr capsule 2024 Self No Yes   Sig: Take 1 capsule (120 mg total) by mouth 2 (two) times a day   ezetimibe (ZETIA) 10 mg tablet 2024 Self No Yes   Sig: take 1 tablet by mouth once daily   flecainide (TAMBOCOR) 100 mg tablet 2024 Self No Yes   Sig: Take 1 tablet (100 mg total) by mouth 2 (two) times a day   furosemide (LASIX) 20 mg tablet 2024  No Yes   Sig: Take 1 tablet (20 mg total) by mouth daily take 2 tablet by mouth daily if needed for shortness of breath WEIGHT GAIN 3 LBS IN 1 DAY OR LOWER LEG SWELLING   gabapentin (NEURONTIN) 300 mg capsule 2024 Self No Yes   Sig: Take 1 capsule (300 mg total) by mouth daily at bedtime   ipratropium (ATROVENT) 0.03 % nasal spray Unknown Self No No   Si sprays into each nostril 3 (three) times a day Begin once per day, then progress to twice per day. Progress to three times a day as tolerated.   lidocaine (XYLOCAINE) 5 % ointment Not Taking Self No No   Sig: Apply topically as needed for mild pain   Patient not taking: Reported on 2024   losartan (COZAAR) 50 mg tablet 2024 Self No Yes   Sig: Take 1 tablet (50 mg total) by mouth 2 (two) times a day   metoprolol succinate (TOPROL-XL) 25 mg 24 hr tablet 2024 Self No Yes   Sig: TAKE 4 TABLETS BY MOUTH EVERY 12 HOURS   rOPINIRole (REQUIP) 1 mg tablet 2024 Self No Yes   Sig: Take 2 tablets (2 mg total) by mouth daily at bedtime   zolpidem (AMBIEN) 10 mg tablet 2024 Self No Yes   Sig: Take 1 tablet (10 mg total) by mouth daily at bedtime as needed for sleep      Facility-Administered Medications: None          Allergies   Allergen Reactions    Sotalol Other (See Comments)     Prolonged QTc leading to  torsades de pointes     Penicillins Other (See Comments)     As a child calcium deposit in the arm     Ace Inhibitors GI Intolerance     Did feel well on it    Omeprazole Abdominal Pain, Rash and Vomiting     stomach pain, vomiting, rash       Objective   Vitals: Blood pressure 156/87, pulse 65, temperature 98.5 °F (36.9 °C), resp. rate 16, weight 74.6 kg (164 lb 7.4 oz), last menstrual period 1990, SpO2 94%, not currently breastfeeding.,     Body mass index is 30.08 kg/m².,     Systolic (24hrs), Av , Min:114 , Max:159     Diastolic (24hrs), Av, Min:69, Max:100    Wt Readings from Last 3 Encounters:   24 74.6 kg (164 lb 7.4 oz)   24 71.7 kg (158 lb)   23 72.6 kg (160 lb)      Lab Results   Component Value Date    CREATININE 0.79 2024    CREATININE 0.85 2024    CREATININE 0.78 2023             Intake/Output Summary (Last 24 hours) at 2024 0859  Last data filed at 2024 0801  Gross per 24 hour   Intake 840 ml   Output --   Net 840 ml     Weight (last 2 days)       Date/Time Weight    24 0600 74.6 (164.46)          Invasive Devices       Peripheral Intravenous Line  Duration             Peripheral IV 24 Proximal;Right;Ventral (anterior) Forearm <1 day                      Physical Exam  Vitals and nursing note reviewed.   Constitutional:       General: She is not in acute distress.     Appearance: She is well-developed. She is obese.      Comments: On RA in NAD   HENT:      Head: Normocephalic and atraumatic.   Neck:      Vascular: No JVD.   Cardiovascular:      Rate and Rhythm: Normal rate and regular rhythm.      Heart sounds: Normal heart sounds. No murmur heard.     No friction rub.   Pulmonary:      Effort: Pulmonary effort is normal. No respiratory distress.      Breath sounds: Normal breath sounds. No wheezing or rales.   Abdominal:      General: Bowel sounds are normal. There is no distension.      Palpations: Abdomen is soft.       Tenderness: There is no abdominal tenderness.   Musculoskeletal:         General: No tenderness. Normal range of motion.      Cervical back: Normal range of motion and neck supple.      Right lower leg: No edema.      Left lower leg: No edema.   Skin:     General: Skin is warm and dry.      Findings: No erythema.   Neurological:      Mental Status: She is alert and oriented to person, place, and time.   Psychiatric:         Mood and Affect: Mood normal.         Behavior: Behavior normal.         Thought Content: Thought content normal.         Judgment: Judgment normal.           LABORATORY RESULTS:      CBC with diff:   Results from last 7 days   Lab Units 01/06/24  0623 01/05/24  1703   WBC Thousand/uL 4.87 5.35   HEMOGLOBIN g/dL 11.4* 12.5   HEMATOCRIT % 35.2 37.6   MCV fL 88 87   PLATELETS Thousands/uL 256 257   RBC Million/uL 3.98 4.33   MCH pg 28.6 28.9   MCHC g/dL 32.4 33.2   RDW % 17.6* 17.4*   MPV fL 9.6 9.5   NRBC AUTO /100 WBCs 0 0       CMP:  Results from last 7 days   Lab Units 01/06/24  0623 01/05/24  1703   POTASSIUM mmol/L 3.9 4.1   CHLORIDE mmol/L 97 93*   CO2 mmol/L 27 25   BUN mg/dL 17 19   CREATININE mg/dL 0.79 0.85   CALCIUM mg/dL 8.7 9.0   AST U/L 18 24   ALT U/L 24 27   ALK PHOS U/L 78 89   EGFR ml/min/1.73sq m 71 65       BMP:  Results from last 7 days   Lab Units 01/06/24  0623 01/05/24  1703   POTASSIUM mmol/L 3.9 4.1   CHLORIDE mmol/L 97 93*   CO2 mmol/L 27 25   BUN mg/dL 17 19   CREATININE mg/dL 0.79 0.85   CALCIUM mg/dL 8.7 9.0          Lab Results   Component Value Date    NTBNP 761 (H) 01/08/2021    NTBNP 2,773 (H) 01/03/2021    NTBNP 506 (H) 03/25/2019            Results from last 7 days   Lab Units 01/06/24  0623 01/05/24  1703   MAGNESIUM mg/dL 1.9 1.7*                 Results from last 7 days   Lab Units 01/05/24  1703   TSH 3RD GENERATON uIU/mL 3.644   FREE T4 ng/dL 1.02       Results from last 7 days   Lab Units 01/05/24  1703   INR  1.26*     Lipid Profile:   Lab Results    Component Value Date    CHOL 205 2015    CHOL 195 07/10/2014     Lab Results   Component Value Date    HDL 53 2023    HDL 66 2021    HDL 54 2020     Lab Results   Component Value Date    LDLCALC 96 2023    LDLCALC 88 2021    LDLCALC 104 (H) 2020     Lab Results   Component Value Date    TRIG 68 2023    TRIG 112 2021    TRIG 121 2020         Cardiac testing:   Results for orders placed during the hospital encounter of 21    Echo complete with contrast if indicated    [address] [place] [address]  [phone]    Transthoracic Echocardiogram  2D, M-mode, Doppler, and Color Doppler    Study date:  2021    Patient: TANISHA LEVINE  MR number: [id number]  Account number: [id number]  : 1945  Age: 75 years  Gender: Female  Status: Inpatient  Location: Bedside  Height: 62 in  Weight: 151.6 lb  BP: 126/ 94 mmHg    Indications: Atrial fibrillation    Diagnoses: I48.0 - Atrial fibrillation    Sonographer:  IGNACIO Cameron  Primary Physician:  Naveen Monsivais MD  Referring Physician:  Shantal Huff MD  Group:  [facility]  Interpreting Physician:  Kwabena Seymour MD    SUMMARY    LEFT VENTRICLE:  Systolic function was normal. Ejection fraction was estimated to be 55 %.  There were no regional wall motion abnormalities.  Wall thickness was at the upper limits of normal.  Doppler parameters were consistent with elevated ventricular end-diastolic filling pressure.    LEFT ATRIUM:  The atrium was mildly to moderately dilated.    RIGHT ATRIUM:  The atrium was mildly dilated.    MITRAL VALVE:  There was mild stenosis.    TRICUSPID VALVE:  There was mild to moderate regurgitation.  Pulmonary artery systolic pressure was mildly increased.    HISTORY: PRIOR HISTORY: HLD, HTN, PAF, chronic CHF, thyroid nodule    PROCEDURE: The procedure was performed at the bedside. This was a  routine study. The transthoracic approach was used. The study included complete 2D imaging, M-mode, complete spectral Doppler, and color Doppler. The heart rate was 101 bpm,  at the start of the study. Images were obtained from the parasternal, apical, subcostal, and suprasternal notch acoustic windows. Echocardiographic views were limited due to lung interference. This was a technically difficult study.    LEFT VENTRICLE: Size was normal. Systolic function was normal. Ejection fraction was estimated to be 55 %. There were no regional wall motion abnormalities. Wall thickness was at the upper limits of normal. DOPPLER: Transmitral flow  pattern: atrial fibrillation. Left ventricular diastolic function parameters were abnormal. Doppler parameters were consistent with elevated ventricular end-diastolic filling pressure.    RIGHT VENTRICLE: The size was normal. Systolic function was normal. Wall thickness was normal.    LEFT ATRIUM: The atrium was mildly to moderately dilated.    RIGHT ATRIUM: The atrium was mildly dilated.    MITRAL VALVE: Valve structure was normal. There was normal leaflet separation. DOPPLER: The transmitral velocity was within the normal range. There was mild stenosis. There was no significant regurgitation.    AORTIC VALVE: The valve was trileaflet. Leaflets exhibited normal thickness and normal cuspal separation. DOPPLER: Transaortic velocity was within the normal range. There was no evidence for stenosis. There was no significant  regurgitation.    TRICUSPID VALVE: The valve structure was normal. There was normal leaflet separation. DOPPLER: The transtricuspid velocity was within the normal range. There was no evidence for stenosis. There was mild to moderate regurgitation. Pulmonary  artery systolic pressure was mildly increased.    PULMONIC VALVE: Leaflets exhibited normal thickness, no calcification, and normal cuspal separation. DOPPLER: The transpulmonic velocity was within the normal  range. There was no significant regurgitation.    PERICARDIUM: There was no pericardial effusion. The pericardium was normal in appearance.    AORTA: The root exhibited normal size.    SYSTEMIC VEINS: IVC: The inferior vena cava was normal in size.    PULMONARY VEINS: DOPPLER: Doppler signals were not recordable in the pulmonary vein(s).    SYSTEM MEASUREMENT TABLES    2D  %FS: 37.15 %  Ao Diam: 2.74 cm  Ao asc: 2.72 cm  EDV(Teich): 103.17 ml  EF(Teich): 67.06 %  ESV(Teich): 33.98 ml  IVSd: 0.94 cm  LA Area: 13.2 cm2  LA Diam: 3.98 cm  LVEDV MOD A4C: 32.01 ml  LVEF MOD A4C: 63.74 %  LVESV MOD A4C: 11.61 ml  LVIDd: 4.72 cm  LVIDs: 2.96 cm  LVLd A4C: 5.9 cm  LVLs A4C: 4.79 cm  LVPWd: 0.93 cm  RA Area: 8.67 cm2  RVIDd: 3.35 cm  SV MOD A4C: 20.4 ml  SV(Teich): 69.19 ml    CW  TR MaxP.39 mmHg  TR Vmax: 2.89 m/s    MM  TAPSE: 1.51 cm    [facility] Accredited Echocardi[facility]    Prepared and electronically signed by    Kwabena Seymour MD  Signed 2021 11:05:34    No results found for this or any previous visit.    No valid procedures specified.  Results for orders placed during the hospital encounter of 10/05/22    NM myocardial perfusion spect (stress and/or rest)    Interpretation Summary    Resting ECG: ECG is abnormal. Resting ECG shows no ST-segment deviation. Patient a paced and V sensed.    Perfusion Defect Conclusion: The stress/rest perfusion ratio is 1.04 . There is no evidence of transient ischemic dilation (TID).    Perfusion: There is a left ventricular perfusion defect that is small in size with mild reduction in uptake present in the mid to apical anterior and anteroseptal location(s) that is predominantly fixed. The defect appears to be an artifact caused by breast attenuation.    Stress Function: Left ventricular function post-stress is normal. Post-stress ejection fraction is 70 %.    Negative exercise pharmacological stress test        Imaging: I have personally  reviewed pertinent reports.    CTA ED chest PE study    Result Date: 1/5/2024  Narrative: CTA - CHEST WITH IV CONTRAST - PULMONARY ANGIOGRAM INDICATION:   palpitations, dyspnea with exersion, tachycardia. COMPARISON: 8/4/2023. TECHNIQUE: CTA examination of the chest was performed using angiographic technique according to a protocol specifically tailored to evaluate for pulmonary embolism.  Multiplanar 2D reformatted images were created from the source data. In addition, coronal 3D MIP postprocessing was performed on the acquisition scanner. Radiation dose length product (DLP) for this visit:  381 mGy-cm .  This examination, like all CT scans performed in the [facility], was performed utilizing techniques to minimize radiation dose exposure, including the use of iterative reconstruction and automated exposure control. IV Contrast:  70 mL of iohexol (OMNIPAQUE) FINDINGS: PULMONARY ARTERIAL TREE:  No pulmonary embolus is seen. LUNGS: Lingular segment left upper lobe discoid atelectasis. Mild bibasilar dependent changes noted. Right greater than left bibasilar mild bronchiectasis suggesting sequelae of previous infectious or inflammatory process. PLEURA:  Unremarkable. HEART/GREAT VESSELS:  Unremarkable for patient's age. Left biventricular pacer noted. No thoracic aortic aneurysm. MEDIASTINUM AND BETTY:  Unremarkable. CHEST WALL AND LOWER NECK:   Stable right thyroid nodules, largest 2.1 cm. Thyroid recommendation as before. VISUALIZED STRUCTURES IN THE UPPER ABDOMEN:  Lobulated hepatic contour suspicious for cirrhosis. OSSEOUS STRUCTURES:  No acute fracture or destructive osseous lesion.     Impression: No PE. Incidental thyroid nodule(s) for which nonemergent thyroid ultrasound is recommended. The study was marked in EPIC for significant notification. Workstation performed: [id number]     Cardiac EP device report    Result Date: 1/5/2024  Narrative: MDT-DUAL CHAMBER PPM (AAIR-DDDR MODE)/ ACTIVE SYSTEM  IS MRI CONDITIONAL CARELINK TRANSMISSION DUE TO SYMPTOMS-ELEVATED HR: BATTERY VOLTAGE ADEQUATE (10.8 YRS). AP 91.1%  7.3% ALL AVAILABLE LEAD PARAMETERS WITHIN NORMAL LIMITS. NO SIGNIFICANT HIGH RATE EPISODES. PRESENTING RHYTHM ST  BPM. NORMAL DEVICE FUNCTION. AM     XR chest pa & lateral    Result Date: 1/4/2024  Narrative: CHEST INDICATION:   Cough, unspecified. COMPARISON:  Comparison made with previous examination(s) dated (SR) 20-Dec-2023,(DX) 04-Aug-2023,(CT) 04-Aug-2023,(NM) 05-Oct-2022,(CR) 13-Jun-2022. EXAM PERFORMED/VIEWS:  XR CHEST PA & LATERAL FINDINGS: Cardiomediastinal silhouette appears enlarged. Dual-chamber pacemaker in place.. The lungs are clear.  No pneumothorax or pleural effusion. Osseous structures appear within normal limits for patient age.     Impression: No acute cardiopulmonary disease. Cardiomegaly with dual-chamber pacemaker noted. No significant change from prior study of 8//2023. Electronically signed: 01/04/2024 05:45 PM Iftikhar Lebron MD    US elastography/UGAP    Result Date: 12/27/2023  Narrative: ELASTOGRAPHY, LIVER ULTRASOUND INDICATION:   R16.0: Hepatomegaly, not elsewhere classified. COMPARISON: 8/5/2023 TECHNIQUE: Targeted ultrasound of the liver was performed with a curvilinear transducer on a [employer] e10 utilizing 2D SWE.  A total of 10 shear wave Elastography samples were obtained. FINDINGS: Shear Wave Elastography sampling was performed to evaluate stiffness changes within the liver associated with fibrosis. E1  6.94 kPa E2  5.53 kPa E3  5.92 kPa E4  6.93 kPa E5  6.13 kPa E6  7.69 kPa E7  7.07 kPa E8  6.54 kPa E9  5.73 kPa E10 6.57 kPa E median:  6.56 kPa. The corresponding Metavir score is F0-F1(absent or mild fibrosis), range 0-8.26kPa. <1.66 m/s. IQR/median:  14.7 %.  (IQR of less than 30% is considered a satisfactory data set). Note: that the stage of liver fibrosis may be overestimated by clinical conditions unrelated to fibrosis, including but not limited to,  nonfasting, hepatic inflammation, vascular congestion, biliary obstruction, and infiltrative liver disease. Furthermore, in some patients with NAFLD, the cut-off values for compensated advanced chronic liver disease may be lower (7-9 kPa). In causes other than viral hepatitis and nonalcoholic fatty liver disease, the cut-off values are not well established. When comparing measurements across time, a clinically significant change should be considered when the delta change is greater than 10%. In all these conditions, however, liver stiffness values within the normal range exclude significant liver fibrosis. Ultrasound-guided attenuation parameter (UGAP) Liver Steatosis Grading A1  0.64 dB/cm/MHz A2  0.68 dB/cm/MHz A3  0.67 dB/cm/MHz A4  0.65 dB/cm/MHz A5  0.62 dB/cm/MHz A6  0.62 dB/cm/MHz A7  0.65 dB/cm/MHz A8  0.69 dB/cm/MHz A9  0.67 dB/cm/MHz A10 0.63 dB/cm/MHz Attenuation coefficient:  0.65 dB/cm/MHz Liver steatosis grading:  S1 ( 5% - 33% steatosis) range 0.65-0.70 dB/cm/MHz IQR/Med:  5.5 % (Less than or equal to 30% is a satisfactory data set.) Reference White paper for [employer] ultrasound-guided attenuation parameter [email or web address]?la=en-[email or web address] 8 mm sessile gallbladder polyp again seen.     Impression: Metavir score: F0 - F1, Absent or Mild Fibrosis According to the updated SRU consensus statement, a liver stiffness of 6.56 kPa, which in the absence of other known clinical signs*, rules out compensated advanced chronic liver disease (cACLD). If there are known clinical signs, may need further test for confirmation. https://pubs.rsna.org/doi/10.1148/radiol.1495842422 Liver steatosis grading:  S1 ( 5% - 33% steatosis) 8 mm gallbladder polyp again seen It is sessile in appearance. According to current consensus recommendations (SRU 2022; 000:1-12), for polyps of this size (7-9 mm) which have a low risk morphology (pedunculated with thick/wide stalk, or  sessile), follow-up ultrasound is recommended at 12 months to ensure stability. Reference: Management of Incidentally Detected Gallbladder Polyps: Society of Radiologists in Ultrasound Consensus Conference Recommendations. Radiology 2022; 000:1-12. https://pubs.rsna.org/doi/full/10.1148/radiol.916868 The study was marked in EPIC for significant notification. Workstation performed: [id number]     US thyroid    Result Date: 12/27/2023  Narrative: THYROID ULTRASOUND INDICATION:    E04.1: Nontoxic single thyroid nodule. COMPARISON: Thyroid ultrasound 9/25/2020 TECHNIQUE:   Ultrasound of the thyroid was performed with a high frequency linear transducer in transverse and sagittal planes including volumetric imaging sweeps as well as traditional still imaging technique. FINDINGS:  Thyroid parenchyma is diffusely heterogeneous in echotexture. Right lobe: 5.3 x 3.0 x 2.4 cm. Volume 18.5 mL Left lobe: Surgically absent. Isthmus: 0.4  cm. Nodule #1.  Image 21. RIGHT midgland anterior nodule measuring 1.8 x 1.4 x 1.3 cm. Not well delineated on the prior study. COMPOSITION:  2 points, solid or almost completely solid . ECHOGENICITY:  1 point, hyperechoic or isoechoic. SHAPE:  0 points, wider-than-tall. MARGIN: 0 points, ill-defined. ECHOGENIC FOCI:  0 points, none or large comet-tail artifacts. TI-RADS Classification: TR 3 (3 points), FNA if >2.5 cm.  Follow if >1.5 cm. Nodule #2.  Image 26. RIGHT midgland posterior nodule measuring 0.9 x 0.8 x 1.0 cm.  Given differences in measuring technique, no significant change from prior. COMPOSITION:  2 points, solid or almost completely solid . ECHOGENICITY:  2 points, hypoechoic. SHAPE:  0 points, wider-than-tall. MARGIN: 0 points, ill-defined. ECHOGENIC FOCI:  0 points, none or large comet-tail artifacts. TI-RADS Classification: TR 4 (4-6 points), FNA if > 1.5 cm. Follow if > 1cm. Nodule #3.  Image 29. RIGHT lower pole nodule measuring 2.5 x 1.7 x 2.5 cm. COMPOSITION:  1 point,  "mixed cystic and solid. ECHOGENICITY:  1 point, hyperechoic or isoechoic. SHAPE:  0 points, wider-than-tall. MARGIN: 0 points, ill-defined. ECHOGENIC FOCI:  0 points, none or large comet-tail artifacts. TI-RADS Classification: TR 2 (2 points), Not suspious. No FNA. There are additional nodules of lesser size and/or TI-RADS score.  These do not necessitate additional evaluation based on ACR criteria.     Impression: No nodule meets current ACR criteria for requiring biopsy but followup ultrasound is recommended in 1 year. Reference: ACR Thyroid Imaging, Reporting and Data System (TI-RADS): White Paper of the ACR TI-RADS Committee. J AM Maribel Radiol 2017;14:587-595. (additional recommendations based on American Thyroid Association 2015 guidelines.) This examination demonstrates a finding requiring imaging follow-up and was logged as such in Epic. Workstation performed: [id number]     Cardiac EP device report    Result Date: 12/21/2023  Narrative: MDT-DUAL CHAMBER PPM (AAIR-DDDR MODE)/ ACTIVE SYSTEM IS MRI CONDITIONAL CARELINK TRANSMISSION: BATTERY STATUS \"11 YRS.\" AP 96%  0%. ALL AVAILABLE LEAD PARAMETERS WITHIN NORMAL LIMITS. NO SIGNIFICANT HIGH RATE EPISODES. NORMAL DEVICE FUNCTION. NC           Counseling / Coordination of Care  Total floor / unit time spent today 45 minutes.  Greater than 50% of total time was spent with the patient and / or family counseling and / or coordination of care.  A description of the counseling / coordination of care: Review of history, current assessment, development of a plan.      Code Status: Level 3 - DNAR and DNI    ** Please Note: Dragon 360 Dictation voice to text software may have been used in the creation of this document. **  "

## 2024-01-06 NOTE — PLAN OF CARE
Problem: CARDIOVASCULAR - ADULT  Goal: Maintains optimal cardiac output and hemodynamic stability  Description: INTERVENTIONS:  - Monitor I/O, vital signs and rhythm  - Monitor for S/S and trends of decreased cardiac output  - Administer and titrate ordered vasoactive medications to optimize hemodynamic stability  - Assess quality of pulses, skin color and temperature  - Assess for signs of decreased coronary artery perfusion  - Instruct patient to report change in severity of symptoms  Outcome: Progressing  Goal: Absence of cardiac dysrhythmias or at baseline rhythm  Description: INTERVENTIONS:  - Continuous cardiac monitoring, vital signs, obtain 12 lead EKG if ordered  - Administer antiarrhythmic and heart rate control medications as ordered  - Monitor electrolytes and administer replacement therapy as ordered  Outcome: Progressing     Problem: METABOLIC, FLUID AND ELECTROLYTES - ADULT  Goal: Electrolytes maintained within normal limits  Description: INTERVENTIONS:  - Monitor labs and assess patient for signs and symptoms of electrolyte imbalances  - Administer electrolyte replacement as ordered  - Monitor response to electrolyte replacements, including repeat lab results as appropriate  - Instruct patient on fluid and nutrition as appropriate  Outcome: Progressing  Goal: Fluid balance maintained  Description: INTERVENTIONS:  - Monitor labs   - Monitor I/O and WT  - Instruct patient on fluid and nutrition as appropriate  - Assess for signs & symptoms of volume excess or deficit  Outcome: Progressing     Problem: HEMATOLOGIC - ADULT  Goal: Maintains hematologic stability  Description: INTERVENTIONS  - Assess for signs and symptoms of bleeding or hemorrhage  - Monitor labs  - Administer supportive blood products/factors as ordered and appropriate  Outcome: Progressing     Problem: PAIN - ADULT  Goal: Verbalizes/displays adequate comfort level or baseline comfort level  Description: Interventions:  - Encourage  patient to monitor pain and request assistance  - Assess pain using appropriate pain scale  - Administer analgesics based on type and severity of pain and evaluate response  - Implement non-pharmacological measures as appropriate and evaluate response  - Consider cultural and social influences on pain and pain management  - Notify physician/advanced practitioner if interventions unsuccessful or patient reports new pain  Outcome: Progressing     Problem: SAFETY ADULT  Goal: Patient will remain free of falls  Description: INTERVENTIONS:  - Educate patient/family on patient safety including physical limitations  - Instruct patient to call for assistance with activity   - Consult OT/PT to assist with strengthening/mobility   - Keep Call bell within reach  - Keep bed low and locked with side rails adjusted as appropriate  - Keep care items and personal belongings within reach  - Initiate and maintain comfort rounds  - Make Fall Risk Sign visible to staff  - Apply yellow socks and bracelet for high fall risk patients  - Consider moving patient to room near nurses station  Outcome: Progressing     Problem: DISCHARGE PLANNING  Goal: Discharge to home or other facility with appropriate resources  Description: INTERVENTIONS:  - Identify barriers to discharge w/patient and caregiver  - Arrange for needed discharge resources and transportation as appropriate  - Identify discharge learning needs (meds, wound care, etc.)  - Arrange for interpretive services to assist at discharge as needed  - Refer to Case Management Department for coordinating discharge planning if the patient needs post-hospital services based on physician/advanced practitioner order or complex needs related to functional status, cognitive ability, or social support system  Outcome: Progressing     Problem: Knowledge Deficit  Goal: Patient/family/caregiver demonstrates understanding of disease process, treatment plan, medications, and discharge  instructions  Description: Complete learning assessment and assess knowledge base.  Interventions:  - Provide teaching at level of understanding  - Provide teaching via preferred learning methods  Outcome: Progressing

## 2024-01-06 NOTE — ASSESSMENT & PLAN NOTE
- S/p lobectomy   - TSH within normal limits  - Nodule seen on CT scan    Plan:   - Follow up T3 and free T4  - Non- urgent follow up with thyroid ultrasound for nodule

## 2024-01-06 NOTE — ASSESSMENT & PLAN NOTE
Wt Readings from Last 3 Encounters:   01/06/24 74.6 kg (164 lb 7.4 oz)   01/04/24 71.7 kg (158 lb)   12/19/23 72.6 kg (160 lb)         - Lasix 20mg prn   - Last taken day of admission 1/5  - Last echo 12/23 with EF of 55%    Plan:   - Continue to monitor fluid status   - Metoprolol 100mg BID   - Lasix 20mg daily

## 2024-01-06 NOTE — ASSESSMENT & PLAN NOTE
Wt Readings from Last 3 Encounters:   01/04/24 71.7 kg (158 lb)   12/19/23 72.6 kg (160 lb)   12/05/23 71.7 kg (158 lb)         - Lasix 20mg prn   - Last taken day of admission 1/5  - Last echo 12/23 with EF of 55%    Plan:   - Continue to monitor fluid status   - Metoprolol 100mg BID   - Lasix 20mg daily

## 2024-01-06 NOTE — DISCHARGE INSTR - AVS FIRST PAGE
Dear Farnaz Martin,     It was our pleasure to care for you here at [facility].  It is our hope that we were always able to exceed the expected standards for your care during your stay.  You were hospitalized due to palpitations.  You were cared for on the fourth floor by Xavi Browne MD under the service of Ebony Mcdonald MD with the [facility] who covers for your primary care physician (PCP), Naveen Monsivais MD, while you were hospitalized.  If you have any questions or concerns related to this hospitalization, you may contact us at .  For follow up as well as any medication refills, we recommend that you follow up with your primary care physician.  A registered nurse will reach out to you by phone within a few days after your discharge to answer any additional questions that you may have after going home.  However, at this time we provide for you here, the most important instructions / recommendations at discharge:     Notable Medication Adjustments -   You may take an additional 120 mg capsule of your Cardizem CD if you have palpitations and elevated heart rate consistent with your known atrial fibrillation/flutter.  Testing Required after Discharge -   CBC and BMP in 1 week  ** Please contact your PCP to request testing orders for any of the testing recommended here **  Important follow up information -   Please follow-up with electrophysiology clinic as scheduled  Please follow-up with your primary care physician in 1 week.  Other Instructions -   Please take your medications as prescribed and as instructed on the packaging.  Please return to the emergency department if your symptoms worsen.  Please review this entire after visit summary as additional general instructions including medication list, appointments, activity, diet, any pertinent wound care, and other additional recommendations from your care team that may  be provided for you.      Sincerely,     Xavi Browne MD

## 2024-01-06 NOTE — INCIDENTAL FINDINGS
The following findings require follow up:  Radiographic finding   Finding: thyroid nodule(s) known to PCP   Follow up required: yes, non emergent US that has been ordered already   non-emergent f/u    Please notify the following clinician to assist with the follow up:   Naveen Monsivais MD

## 2024-01-06 NOTE — ASSESSMENT & PLAN NOTE
- Started Lasix 20mg daily; was previously taking 20mg prn   - Likely due to consistent diuresis     Plan:   - Fluid restriction   - Repeat BMP in AM

## 2024-01-06 NOTE — ASSESSMENT & PLAN NOTE
-  3/15/21 Sp MDT dual chamber PPM insertion    - 8/07/23TEE DCCV with a single biphasic shock 300J which was successful to NSR   - Follows with Dr. Gomez     Plan:   - Continue Cardizem 120mg BID  - Cont Metoprolol 100mg BID  - Cont Flecainide   - Anticoagulated with Eliquis 5mg BID  - Monitor on tele

## 2024-01-06 NOTE — NURSING NOTE
Patient d/c'd to home. IV removed and all d/c paperwork given and explained to patient and daughter. All belongings taken with patient, no belongings left in room.

## 2024-01-08 ENCOUNTER — PATIENT OUTREACH (OUTPATIENT)
Dept: FAMILY MEDICINE CLINIC | Facility: CLINIC | Age: 79
End: 2024-01-08

## 2024-01-08 ENCOUNTER — TRANSITIONAL CARE MANAGEMENT (OUTPATIENT)
Dept: FAMILY MEDICINE CLINIC | Facility: CLINIC | Age: 79
End: 2024-01-08

## 2024-01-08 DIAGNOSIS — Z71.89 COORDINATION OF COMPLEX CARE: Primary | ICD-10-CM

## 2024-01-08 LAB
ATRIAL RATE: 121 BPM
ATRIAL RATE: 130 BPM
PR INTERVAL: 120 MS
PR INTERVAL: 136 MS
QRS AXIS: -34 DEGREES
QRS AXIS: 144 DEGREES
QRSD INTERVAL: 198 MS
QRSD INTERVAL: 204 MS
QT INTERVAL: 382 MS
QT INTERVAL: 410 MS
QTC INTERVAL: 562 MS
QTC INTERVAL: 582 MS
T WAVE AXIS: 97 DEGREES
T WAVE AXIS: 99 DEGREES
VENTRICULAR RATE: 121 BPM
VENTRICULAR RATE: 130 BPM

## 2024-01-08 PROCEDURE — 93010 ELECTROCARDIOGRAM REPORT: CPT | Performed by: INTERNAL MEDICINE

## 2024-01-08 NOTE — PROGRESS NOTES
Spoke with Farnaz. Reports she is doing good. No heart palpitations. Last occures two days ago. To see cardiology on 1/11/24 and PCP on 1/12/24. She will get blood work done that was ordred by PCP back in December. Declines further outreach Feels she can manage everything okay.

## 2024-01-09 ENCOUNTER — APPOINTMENT (OUTPATIENT)
Dept: LAB | Facility: CLINIC | Age: 79
End: 2024-01-09
Payer: MEDICARE

## 2024-01-09 LAB
ALBUMIN SERPL BCP-MCNC: 4.1 G/DL (ref 3.5–5)
ALP SERPL-CCNC: 84 U/L (ref 34–104)
ALT SERPL W P-5'-P-CCNC: 26 U/L (ref 7–52)
ANION GAP SERPL CALCULATED.3IONS-SCNC: 10 MMOL/L
AST SERPL W P-5'-P-CCNC: 19 U/L (ref 13–39)
BASOPHILS # BLD AUTO: 0.06 THOUSANDS/ÂΜL (ref 0–0.1)
BASOPHILS NFR BLD AUTO: 1 % (ref 0–1)
BILIRUB SERPL-MCNC: 0.98 MG/DL (ref 0.2–1)
BUN SERPL-MCNC: 17 MG/DL (ref 5–25)
CALCIUM SERPL-MCNC: 9.3 MG/DL (ref 8.4–10.2)
CHLORIDE SERPL-SCNC: 92 MMOL/L (ref 96–108)
CHOLEST SERPL-MCNC: 123 MG/DL
CO2 SERPL-SCNC: 26 MMOL/L (ref 21–32)
CREAT SERPL-MCNC: 0.74 MG/DL (ref 0.6–1.3)
CREAT UR-MCNC: 45.2 MG/DL
EOSINOPHIL # BLD AUTO: 0.23 THOUSAND/ÂΜL (ref 0–0.61)
EOSINOPHIL NFR BLD AUTO: 4 % (ref 0–6)
ERYTHROCYTE [DISTWIDTH] IN BLOOD BY AUTOMATED COUNT: 16.6 % (ref 11.6–15.1)
EST. AVERAGE GLUCOSE BLD GHB EST-MCNC: 171 MG/DL
GFR SERPL CREATININE-BSD FRML MDRD: 77 ML/MIN/1.73SQ M
GLUCOSE P FAST SERPL-MCNC: 121 MG/DL (ref 65–99)
HBA1C MFR BLD: 7.6 %
HCT VFR BLD AUTO: 37.5 % (ref 34.8–46.1)
HDLC SERPL-MCNC: 46 MG/DL
HGB BLD-MCNC: 12.3 G/DL (ref 11.5–15.4)
IMM GRANULOCYTES # BLD AUTO: 0.02 THOUSAND/UL (ref 0–0.2)
IMM GRANULOCYTES NFR BLD AUTO: 0 % (ref 0–2)
LDLC SERPL CALC-MCNC: 64 MG/DL (ref 0–100)
LYMPHOCYTES # BLD AUTO: 1.35 THOUSANDS/ÂΜL (ref 0.6–4.47)
LYMPHOCYTES NFR BLD AUTO: 25 % (ref 14–44)
MCH RBC QN AUTO: 28.7 PG (ref 26.8–34.3)
MCHC RBC AUTO-ENTMCNC: 32.8 G/DL (ref 31.4–37.4)
MCV RBC AUTO: 87 FL (ref 82–98)
MICROALBUMIN UR-MCNC: 21.2 MG/L
MICROALBUMIN/CREAT 24H UR: 47 MG/G CREATININE (ref 0–30)
MONOCYTES # BLD AUTO: 0.48 THOUSAND/ÂΜL (ref 0.17–1.22)
MONOCYTES NFR BLD AUTO: 9 % (ref 4–12)
NEUTROPHILS # BLD AUTO: 3.17 THOUSANDS/ÂΜL (ref 1.85–7.62)
NEUTS SEG NFR BLD AUTO: 61 % (ref 43–75)
NONHDLC SERPL-MCNC: 77 MG/DL
NRBC BLD AUTO-RTO: 0 /100 WBCS
PLATELET # BLD AUTO: 286 THOUSANDS/UL (ref 149–390)
PMV BLD AUTO: 10.1 FL (ref 8.9–12.7)
POTASSIUM SERPL-SCNC: 4.2 MMOL/L (ref 3.5–5.3)
PROT SERPL-MCNC: 7 G/DL (ref 6.4–8.4)
RBC # BLD AUTO: 4.29 MILLION/UL (ref 3.81–5.12)
SODIUM SERPL-SCNC: 128 MMOL/L (ref 135–147)
TRIGL SERPL-MCNC: 63 MG/DL
WBC # BLD AUTO: 5.31 THOUSAND/UL (ref 4.31–10.16)

## 2024-01-10 ENCOUNTER — RA CDI HCC (OUTPATIENT)
Dept: OTHER | Facility: HOSPITAL | Age: 79
End: 2024-01-10

## 2024-01-10 NOTE — PROGRESS NOTES
I13.0  HCC coding opportunities          Chart Reviewed number of suggestions sent to Provider: 1     Patients Insurance     Medicare Insurance: Medicare

## 2024-01-11 ENCOUNTER — OFFICE VISIT (OUTPATIENT)
Dept: CARDIOLOGY CLINIC | Facility: CLINIC | Age: 79
End: 2024-01-11
Payer: MEDICARE

## 2024-01-11 VITALS
WEIGHT: 161.8 LBS | HEART RATE: 63 BPM | BODY MASS INDEX: 29.77 KG/M2 | SYSTOLIC BLOOD PRESSURE: 132 MMHG | HEIGHT: 62 IN | DIASTOLIC BLOOD PRESSURE: 81 MMHG

## 2024-01-11 DIAGNOSIS — E78.2 MIXED HYPERLIPIDEMIA: ICD-10-CM

## 2024-01-11 DIAGNOSIS — E87.1 HYPONATREMIA: ICD-10-CM

## 2024-01-11 DIAGNOSIS — N18.32 STAGE 3B CHRONIC KIDNEY DISEASE (HCC): ICD-10-CM

## 2024-01-11 DIAGNOSIS — R00.0 SINUS TACHYCARDIA: ICD-10-CM

## 2024-01-11 DIAGNOSIS — I50.32 CHRONIC DIASTOLIC CHF (CONGESTIVE HEART FAILURE) (HCC): ICD-10-CM

## 2024-01-11 DIAGNOSIS — Z09 HOSPITAL DISCHARGE FOLLOW-UP: Primary | ICD-10-CM

## 2024-01-11 DIAGNOSIS — Z95.0 S/P PLACEMENT OF CARDIAC PACEMAKER: ICD-10-CM

## 2024-01-11 DIAGNOSIS — I48.0 PAROXYSMAL ATRIAL FIBRILLATION (HCC): ICD-10-CM

## 2024-01-11 DIAGNOSIS — I10 ESSENTIAL HYPERTENSION: ICD-10-CM

## 2024-01-11 PROCEDURE — 99215 OFFICE O/P EST HI 40 MIN: CPT | Performed by: NURSE PRACTITIONER

## 2024-01-11 RX ORDER — METOPROLOL SUCCINATE 100 MG/1
100 TABLET, EXTENDED RELEASE ORAL EVERY 12 HOURS
Start: 2024-01-11

## 2024-01-11 NOTE — LETTER
January 11, 2024     Naveen Monsivais MD  [address] [place] [id number]    Patient: Farnaz Martin   YOB: 1945   Date of Visit: 1/11/2024       Dear Dr. Monsivais:    Thank you for referring Farnaz Martin to me for evaluation. Below are my notes for this consultation.    If you have questions, please do not hesitate to call me. I look forward to following your patient along with you.         Sincerely,        JENNY Cruz        CC: Nir Cunningham, JENNY Tyson  1/11/2024  9:07 AM  Sign when Signing Visit  Cardiology  Hospital follow-up   office Visit Note  Farnaz Martin   78 y.o.   female   MRN: [id number]  [facility] [place]  [address] [facility] [address]  [facility] [id number] [phone]    PCP: Naveen Monsivais MD  Cardiologist: Dr. Cunningham            Summary of recommendations  48 ounces of fluid per day  If she gains.  Salt restricted diet.  3 pounds in 24 hours she can take an extra dose of Lasix 20 mg.  if her heart rate becomes elevated at 120 again, she can take an additional diltiazem 120 mg a day as needed  Follow up will be scheduled with Dr Cunningham 3/4/24, nonfasting BMP, BNP prior to        Assessment/plan  Palpitations.  Recent hospital adm 1/5-1/6/24. Pacer interrogation and telemetry revealed sinus tachycardia.  This was in the setting of a recent COVID booster.  Evaluated by Cardiology.  She has had no recurrence since discharge  Workup unremarkable   if her heart rate becomes elevated at 120 again, she can take an additional diltiazem 120 mg a day as needed.  She has not needed to take 1  Chronic HFpEF  Wt Readings from Last 3 Encounters:   01/11/24 73.4 kg (161 lb 12.8 oz)   01/06/24 74.6 kg (164 lb 7.4 oz)   01/04/24 71.7 kg (158 lb)   --beta-blocker: Toprol 100 mg every 12h  --Diuretic: Lasix 20 mg daily.  In the past, she was taking this as needed.  More recently, she has been taking this daily and has been  feeling better  --ACE/ARB/ARNI: Losartan 50 mg twice daily  --MRA:   --SLGT2I  No  --2 g sodium diet, 1800 cc fluid restriction. Daily weights.  If she gains 3 pounds in 24 hours or 5 pounds in 5 days, she can take an extra dose of Lasix 20 mg  Chronic hyponatremia.  Advised fluid restriction of 48 ounces a day.  Will repeat a nonfasting BMP BNP in a short interval  Paroxysmal Atrial fibrillation/Flutter. Symptomatic  Initial diagnosis of A-fib was 2016  Sleep study negative for sleep apnea  2016  S/P cryoablation with pulmonary vein isolation 3/12/21- developed symptomatic bradycardia and underwent subsequent MDT DC ppm implant (3/2021).  Initiated on flecainide after  S/P DCCV 1/2022.  Placed on amiodarone.    S/P DCCV 3/2022  June 2022: Hospitalized for heart failure.  Digoxin was started  S/P Pulmonary vein isolation, rare isolation of right vein, and a straight modification with posterior wall modification.  Successful CTI flutter ablation   by Dr Muhammad  8/2/22 [facility] she was discharged on amiodarone 200 mg daily..  Digoxin was discontinued.  Toprol was decreased to 75 mg twice daily.  Amiodarone DCd 3 months post procedure, per EP  S/P SHANNAN/DCCV 8/7/23  On OAC with Eliquis 5 mg twice daily  Rate control with diltiazem CD1 20 mg daily and metoprolol succinate 100 mg every 12  MORENITA with flecainide 100 mg every 12.  Initiated following cardioversion  Allergies: Sotalol- prolonged QT leading to  torsades de pointes  SSS; S/P PPM 3/2021  Interrogation 1/5/24:Battery adequate.  AP 91.1%  7.3%.  Presenting rhythm sinus tachycardia 122 bpm.  Hypertension, essential.  /81  losartan 50 mg twice daily, diltiazem  mg every 12, Toprol 100 mg every 12, low-dose Lasix  Hyperlipidemia, on ezetimibe 10 mg daily.  Not on statin therapy for unclear reasons.  She does have diabetes mellitus.  Not further addressed today.  1/9/2024: Calculated LDL 64, non-HDL 77   Latest Reference Range & Units 04/01/21 13:45 07/03/23  08:23 01/09/24 09:11   Cholesterol See Comment mg/dL 176 163 123   Triglycerides See Comment mg/dL 112 68 63   HDL >=50 mg/dL 66 53 46 (L)   Non-HDL Cholesterol mg/dl 110  77   LDL Calculated 0 - 100 mg/dL 88 96 64   Thyroid nodule, status post thyroid lobectomy 12/16/20 by Dr. Bhatt  Type 2 diabetes mellitus without long-term use of insulin.  Hemoglobin A1c 7.6   Latest Reference Range & Units 07/11/22 08:41 01/31/23 08:58 07/03/23 08:23 01/09/24 09:11   Hemoglobin A1C Normal 4.0-5.6%; PreDiabetic 5.7-6.4%; Diabetic >=6.5%; Glycemic control for adults with diabetes <7.0% % 7.3 (H) 6.5 (H) 6.6 (H) 7.6 (H)   MGUS  Meningioma, diagnosed 8/20/2022. Follows with Dr Juanito KIM, at [facility]  Cardiac testing  TTE  September 2018. EF 60%.  Grade 2 diastolic dysfunction.  RV systolic pressure mildly increased, peak pressure 38 mm Hg.  Left atrium mildly dilated.  Mild MR. Mild TR.  ambulatory Holter monitor 10/15/20.  Five days 20 minutes.  Average heart rate 53 beats per minute.  For triggers.  Five episodes of SVT longest 7 beats at 110 beats per minute  SPECT 10/22: Negative exercise pharmacological stress test   TTE 6/2023.  EF 55%.  Wall motion normal.  Grade 2 DD.  RV normal size and function.  Pacer wires present.  Mild LAE.  Mild AI.  Moderate MR.  Mild to moderate TR.  RVSP is mildly to severely elevated.  RVSP estimated at 64 mmHg.  SHANNAN 8/7/23. EF 65%. No RWMA.  RV normal size and function.  Moderate LAE.  No thrombus.  No PFO.  LA a dilated.  Normal function, no thrombus.  Moderate TR  TTE ( limited) 12/5/2023: EF 55%, no WMA, Grade 1 DD, normal RV function, moderate MR, moderate TR with PASP of 53 mmHg.                    FABIO Martin is a 75-year-old female with paroxysmal atrial fibrillation essential hypertension and mixed hyperlipidemia.  She follows with cardiologist Dr. Cunningham.  She was last seen December 9th.  She is found to have a thyroid nodule and was scheduled to undergo surgery.  She was seen for  preoperative scan point.  She was doing well.  She was advised to hold the Eliquis for few days prior to her procedure.  She was scheduled to come back to see him in 8 months      12/21/2020  Acute visit.  On the 16th of December she had a thyroid procedure/lobectomy.  Two days later she began with palpitations.  She reports she was told that she could take 1 and half pills of her sotalol, 180 mg twice daily if needed by her cardiologist..  She has been doing that for a few days.  Despite this, she remains in AFib today.  Her rate is 112 beats per minute.  Case was discussed briefly in the office with Dr. Ruggiero who is in the office with me today.  Plan:  Check TSH, free T4.  If abnormal, refer to PCP.  Will add metoprolol succinate 25 mg b.i.d. Continue sotalol at 120 mg q.12 hours.  Will discontinue amlodipine for blood pressure room.  Continue anticoagulation at 5 mg b.i.d..  EKG in 1 week.  If her TSH is normal and she remains in AFib we could proceed with a SHANNAN guided cardioversion.    Interval history  She has followed regular with Dr. Cunningham.  She has been followed by EP Dr. Omalley and EP at [facility]    She developed a prolonged QTc and bradycardia on sotalol.  This was discontinued indefinitely  S/P cryoablation with pulmonary vein isolation 3/12/21- developed symptomatic bradycardia and underwent subsequent MDT DC PPM implant (3/2021).  Initiated on flecainide postprocedure  S/P DCCV 1/2022.  Placed on amiodarone.    S/P DCCV 3/2022  June 2022: Hospitalized for heart failure.  Digoxin was started  8/22 dx meningioma  8/2022: S/P Pulmonary vein isolation, rare isolation of right vein, and a straight modification with posterior wall modification.  Successful CTI flutter ablation   by Dr Muhammad  8/2/22 [facility] she was discharged on amiodarone 200 mg daily..  Digoxin was discontinued.  Toprol was decreased to 75 mg twice daily  S/P SHANNAN/DCCV 8/7/23  On OAC with Eliquis 5 mg twice daily  Rate control with diltiazem CD1  20 mg daily and metoprolol succinate 100 mg every 12  MORENITA with flecainide 100 mg every 12  10/12/22:  telemedicine visit EP- Dr Raza ([place])  Per his note:   Farnaz is doing well from an arrhythmia standpoint has had no recurrent atrial fibrillation since her ablation. Her most recent ischemia evaluation was normal and she is normal ejection fraction so we discussed that if she does need to move forward with meningioma surgery she can have this done in hold her anticoagulation for up to 3 days prior to the procedure and restart when appropriate postoperatively. We also discussed that she should discontinue her amiodarone at 3 months post procedure and if she does need an anti arrhythmia medication then either sotalol or dofetilide would be better options. Otherwise she will continue with remote device checks and follow-up in her primary cardiologist's office and I made no changes to her medication at this time. However I did discuss she can discontinue her 1 month course of baby aspirin as well as reflux medicine.    Adm  8/2023 :symptomatic A-fib  Followed by Cardiology   started on Cardizem drip, cardiology consulted and added flecainide  Weaned off Cardizem gtt   Likely in the setting postoperative trigger with somewhat superimposed uncontrolled pain  Flecainide and Cardizem did not resolve pt's afib, cardioversion procedure was done and successful (8/7/23)  DC cardiac medications: Amlodipine 5 mg daily, Eliquis 5 twice daily, Dilt 120 mg daily, Zetia 10 mg daily, flecainide 100 mg every 12, Lasix 20 as needed, losartan 50 twice daily  Toprol 100 every 12      1/2024: Maintenance cardiac medications include:  Eliquis 5 mg BID, diltiazem 120 mg BID, Zetia 10 mg daily, flecainide 100 mg BID, Toprol- mg BID, Lasix 20 mg daily, losartan 50 mg BID.    Adm 1/5-1/6/24  Palps-->.  Sinus tach  CC: Palpitations, all day  Notably she had a recent COVID booster 2 days prior  Pacemaker interrogation: Sinus  tachycardia--advised ED evaluation  Tachycardia, -130  CTA chest: No PE  Labs remarkable for sodium 129  HS troponin negative x 3  CBC stable, TFTs stable  She was felt to be euvolemic in regard to chronic HFpE  Evaluated by cardiology  EKG-1/5/2024-V paced rhythm at 130 bpm.  When compared to the EKG from 8/7/2023 atrial paced rhythm at 66 bpm was noted.  Telemetry - Sinus tachycardia with ventricular paced rhythm with multiple runs of NSVT.  Currently ventricular paced in the 60s  Per cardiology noted:  -->Her tachycardia, persistent at 120 to 130 bpm, with a sudden drop to 60 beats paced, argues against sinus tachycardia, rather atrial tachycardia as a possible side effect of the recent COVID booster  Patient not significantly symptomatic, but numerous nonspecific symptoms possibly as a result of her recent COVID booster.  She looks quite stable from a cardiac perspective, discharge is reasonable, if her heart rate becomes elevated at 120 again, she can take an additional diltiazem 120 mg a day as needed.  Her daughter was in the room and was agreeable to the plan.       1/11/24  PMH:  PAF/SSS. S/P AF ablation x 2, SHANNAN/DCCV ( 8/2023),S/P PPM. T2DM. HTN. HL. CKD3. P Htn.  Recent palps- found to be in ST after Covid booster    Hospital follow-up.  Her daughter Coral participated by telephone  Farnaz tells me she is having rare fluttering in her chest 1-2 times a week, self-limiting.  She has not needed to take any extra Cardizem at all.  She has been taking the Lasix 20 mg daily regularly or recently.  Her cough has gone away.  She has chronic JOHNSON no worse than her usual.  She has steps in her house but rarely goes down them.  She does weigh herself regularly, ranging from 1 53-1 57.  In the office today she is 161 pounds.  She denies lightheadedness or dizziness.  She is abiding by a she has been hydrating, around 64 ounces of fluid a day.  We reviewed her most recent labs.  She has chronic  hyponatremia  Going forward, I recommend limiting her fluid to 48 ounces a day.  Will repeat labs in short interval before returning to see her cardiologist in a short interval    In addition, she has been taking Toprol 25 mg tabs, 4 tabs twice daily.  Salt restricted diet.  At her next refill will transition to 100 mg every 12         Assessment  Diagnoses and all orders for this visit:    Hospital discharge follow-up    Chronic diastolic CHF (congestive heart failure) ([facility])  -     B-Type Natriuretic Peptide(BNP); Future    Paroxysmal atrial fibrillation ([facility])  -     metoprolol succinate (TOPROL-XL) 100 mg 24 hr tablet; Take 1 tablet (100 mg total) by mouth every 12 (twelve) hours    S/P placement of cardiac pacemaker    Hyponatremia  -     Basic metabolic panel; Future    Stage 3b chronic kidney disease ([facility])    Mixed hyperlipidemia    Sinus tachycardia    Essential hypertension            Past Medical History:   Diagnosis Date   • Actinic keratosis     last assessed - 06Jun2014   • Arthritis    • Atrial fibrillation with rapid ventricular response ([facility])     last assessed - 26Apr2016   • Basal cell carcinoma    • Benign colon polyp     last assessed - 27Apr2015   • CHF (congestive heart failure) ([facility]) 06/2022   • Disease of thyroid gland    • Effusion of knee joint right     Resolved - 19Apr2016   • Esophageal reflux    • Fibromyalgia     last assessed - [id number]   • Fibromyalgia, primary    • GERD (gastroesophageal reflux disease)    • Hypertension    • Palpitations     last assessed - 30Apr2013   • Peroneal tendonitis, right     last assessed - 02Oct2013   • Right lumbar radiculopathy 03/17/2016   • Thyroid cancer ([facility])    • Thyroid nodule    • Trochanteric bursitis of left hip 03/09/2018       Review of Systems   Constitutional: Negative for chills.   Cardiovascular:  Positive for palpitations. Negative for chest pain, claudication, cyanosis, dyspnea on exertion, irregular heartbeat, leg swelling,  near-syncope, orthopnea, paroxysmal nocturnal dyspnea and syncope.   Respiratory:  Positive for shortness of breath. Negative for cough.    Gastrointestinal:  Negative for heartburn and nausea.   Neurological:  Negative for dizziness, focal weakness, headaches, light-headedness and weakness.   All other systems reviewed and are negative.      Allergies   Allergen Reactions   • Sotalol Other (See Comments)     Prolonged QTc leading to torsades de pointes    • Penicillins Other (See Comments)     As a child calcium deposit in the arm    • Ace Inhibitors GI Intolerance     Did feel well on it   • Omeprazole Abdominal Pain, Rash and Vomiting     stomach pain, vomiting, rash     .    Current Outpatient Medications:   •  apixaban (ELIQUIS) 5 mg, Take 1 tablet (5 mg total) by mouth 2 (two) times a day, Disp: 42 tablet, Rfl: 0  •  ascorbic acid (VITAMIN C) 500 mg tablet, Take 500 mg by mouth daily , Disp: , Rfl:   •  Cetirizine HCl (ZyrTEC Allergy) 10 MG CAPS, Take 10 mg by mouth in the morning, Disp: , Rfl:   •  Cholecalciferol 50 MCG (2000 UT) CAPS, Take 2,000 Units by mouth 2 (two) times a day, Disp: , Rfl:   •  Chromium Picolinate (CHROMIUM PICOLATE PO), Take 1 tablet by mouth daily, Disp: , Rfl:   •  diltiazem (CARDIZEM CD) 120 mg 24 hr capsule, Take 1 capsule (120 mg total) by mouth 2 (two) times a day You may take 1 additional capsule daily (120 mg) if you have palpitations and elevated heart rate consistent with your atrial flutter/fibrillation. Max 360 mg daily., Disp: 90 capsule, Rfl: 0  •  DULoxetine (CYMBALTA) 30 mg delayed release capsule, take 1 capsule by mouth once daily, Disp: 30 capsule, Rfl: 2  •  ezetimibe (ZETIA) 10 mg tablet, take 1 tablet by mouth once daily, Disp: 90 tablet, Rfl: 3  •  flecainide (TAMBOCOR) 100 mg tablet, Take 1 tablet (100 mg total) by mouth 2 (two) times a day, Disp: 14063 tablet, Rfl: 3  •  furosemide (LASIX) 20 mg tablet, Take 1 tablet (20 mg total) by mouth daily take 2 tablet  by mouth daily if needed for shortness of breath WEIGHT GAIN 3 LBS IN 1 DAY OR LOWER LEG SWELLING, Disp: 90 tablet, Rfl: 3  •  gabapentin (NEURONTIN) 300 mg capsule, Take 1 capsule (300 mg total) by mouth daily at bedtime, Disp: 90 capsule, Rfl: 1  •  losartan (COZAAR) 50 mg tablet, Take 1 tablet (50 mg total) by mouth 2 (two) times a day, Disp: 180 tablet, Rfl: 3  •  metoprolol succinate (TOPROL-XL) 100 mg 24 hr tablet, Take 1 tablet (100 mg total) by mouth every 12 (twelve) hours, Disp: , Rfl:   •  rOPINIRole (REQUIP) 1 mg tablet, Take 2 tablets (2 mg total) by mouth daily at bedtime, Disp: 180 tablet, Rfl: 1  •  Saccharomyces boulardii (Probiotic) 250 MG CAPS, Take 1 capsule by mouth daily, Disp: , Rfl:   •  TURMERIC PO, Take 1 capsule by mouth 2 (two) times a day, Disp: , Rfl:   •  zolpidem (AMBIEN) 10 mg tablet, Take 1 tablet (10 mg total) by mouth daily at bedtime as needed for sleep, Disp: 90 tablet, Rfl: 1  •  ipratropium (ATROVENT) 0.03 % nasal spray, 2 sprays into each nostril 3 (three) times a day Begin once per day, then progress to twice per day. Progress to three times a day as tolerated. (Patient not taking: Reported on 1/11/2024), Disp: 90 mL, Rfl: 3        Social History     Socioeconomic History   • Marital status:      Spouse name: Not on file   • Number of children: Not on file   • Years of education: Not on file   • Highest education level: Not on file   Occupational History   • Not on file   Tobacco Use   • Smoking status: Never   • Smokeless tobacco: Never   Vaping Use   • Vaping status: Never Used   Substance and Sexual Activity   • Alcohol use: Not Currently   • Drug use: Never   • Sexual activity: Not Currently   Other Topics Concern   • Not on file   Social History Narrative   • Not on file     Social Determinants of Health     Financial Resource Strain: Patient Unable To Answer (12/19/2023)    Overall Financial Resource Strain (CARDIA)    • Difficulty of Paying Living Expenses:  Patient unable to answer   Food Insecurity: No Food Insecurity (1/5/2024)    Hunger Vital Sign    • Worried About Running Out of Food in the Last Year: Never true    • Ran Out of Food in the Last Year: Never true   Transportation Needs: No Transportation Needs (1/5/2024)    PRAPARE - Transportation    • Lack of Transportation (Medical): No    • Lack of Transportation (Non-Medical): No   Physical Activity: Not on file   Stress: Not on file   Social Connections: Not on file   Intimate Partner Violence: Not At Risk (7/31/2023)    Received from [facility]    Humiliation, Afraid, Rape, and Kick questionnaire    • Fear of Current or Ex-Partner: No    • Emotionally Abused: No    • Physically Abused: No    • Sexually Abused: No   Housing Stability: Low Risk  (1/5/2024)    Housing Stability Vital Sign    • Unable to Pay for Housing in the Last Year: No    • Number of Places Lived in the Last Year: 1    • Unstable Housing in the Last Year: No       Family History   Problem Relation Age of Onset   • Heart disease Mother    • Diabetes Mother    • Heart disease Father    • Coronary artery disease Father    • Stroke Father         cerebrovascular accident   • Heart attack Father         myocardial infarction   • Sudden death Father         scd   • Other Family         Back disorder   • Coronary artery disease Family    • Neuropathy Family    • Osteoporosis Family    • No Known Problems Daughter    • No Known Problems Maternal Grandmother    • No Known Problems Maternal Grandfather    • No Known Problems Paternal Grandmother    • No Known Problems Paternal Grandfather    • Cancer Maternal Uncle    • Breast cancer Maternal Aunt 65   • No Known Problems Son    • No Known Problems Maternal Aunt    • No Known Problems Maternal Aunt    • No Known Problems Maternal Aunt    • No Known Problems Paternal Aunt    • No Known Problems Paternal Aunt    • Anuerysm Neg Hx    • Clotting disorder Neg Hx    • Arrhythmia Neg Hx   "  • Heart failure Neg Hx        Physical Exam  Vitals and nursing note reviewed.   Constitutional:       General: She is not in acute distress.     Appearance: She is not diaphoretic.   HENT:      Head: Normocephalic and atraumatic.   Eyes:      Conjunctiva/sclera: Conjunctivae normal.   Cardiovascular:      Rate and Rhythm: Tachycardia present. Rhythm irregularly irregular.      Pulses: Intact distal pulses.      Heart sounds: Normal heart sounds.   Pulmonary:      Effort: Pulmonary effort is normal.      Breath sounds: Normal breath sounds.   Abdominal:      General: Bowel sounds are normal.      Palpations: Abdomen is soft.   Musculoskeletal:         General: Normal range of motion.      Cervical back: Normal range of motion and neck supple.   Skin:     General: Skin is warm and dry.   Neurological:      Mental Status: She is alert and oriented to person, place, and time.         Vitals: Blood pressure 132/81, pulse 63, height 5' 2\" (1.575 m), weight 73.4 kg (161 lb 12.8 oz), last menstrual period 02/01/1990, not currently breastfeeding.   Wt Readings from Last 3 Encounters:   01/11/24 73.4 kg (161 lb 12.8 oz)   01/06/24 74.6 kg (164 lb 7.4 oz)   01/04/24 71.7 kg (158 lb)         Labs & Results:  Lab Results   Component Value Date    WBC 5.31 01/09/2024    HGB 12.3 01/09/2024    HCT 37.5 01/09/2024    MCV 87 01/09/2024     01/09/2024     BNP   Date Value Ref Range Status   08/10/2023 153 (H) 0 - 100 pg/mL Final   08/04/2023 651 (H) 0 - 100 pg/mL Final   06/13/2022 928 (H) 0 - 100 pg/mL Final     No components found for: \"CHEM\"  Total CK   Date Value Ref Range Status   07/10/2014 100 0 - 175 U/L Final     Comment:     Total CK <131, CKMB not indicated  The above 1 analytes were performed by [address] [address]       Troponin I   Date Value Ref Range Status   01/08/2021 <0.02 <=0.04 ng/mL Final     Comment:     [employer] Chemistry analyzer 99% cutoff is > 0.04 ng/mL in network labs "     o cTnI 99% cutoff is useful only when applied to patients in the clinical setting of myocardial ischemia   o cTnI 99% cutoff should be interpreted in the context of clinical history, ECG findings and possibly cardiac imaging to establish correct diagnosis.   o cTnI 99% cutoff may be suggestive but clearly not indicative of a coronary event without the clinical setting of myocardial ischemia.     2021 <0.02 <=0.04 ng/mL Final     Comment:     [employer] Chemistry analyzer 99% cutoff is > 0.04 ng/mL in network labs     o cTnI 99% cutoff is useful only when applied to patients in the clinical setting of myocardial ischemia   o cTnI 99% cutoff should be interpreted in the context of clinical history, ECG findings and possibly cardiac imaging to establish correct diagnosis.   o cTnI 99% cutoff may be suggestive but clearly not indicative of a coronary event without the clinical setting of myocardial ischemia.     2021 <0.02 <=0.04 ng/mL Final     Comment:     Autovalidation override  [employer] Chemistry analyzer 99% cutoff is > 0.04 ng/mL in network labs     o cTnI 99% cutoff is useful only when applied to patients in the clinical setting of myocardial ischemia   o cTnI 99% cutoff should be interpreted in the context of clinical history, ECG findings and possibly cardiac imaging to establish correct diagnosis.   o cTnI 99% cutoff may be suggestive but clearly not indicative of a coronary event without the clinical setting of myocardial ischemia.       Results for orders placed during the hospital encounter of 09/15/18   Echo complete with contrast if indicated    [id number] [place], [place] [id number] [phone]    Transthoracic Echocardiogram  2D, M-mode, Doppler, and Color Doppler    Study date:  16-Sep-2018    Patient: TANISHA LEVINE  MR number: [id number]  Account number: [id number]  : 1945  Age: 73 years  Gender: Female  Status: Inpatient  Location:  Bedside  Height: 63 in  Weight: 159.7 lb  BP: 130/ 88 mmHg    Indications: Atrial Fibrillation    Diagnoses: I48.0 - Atrial fibrillation    Sonographer:  Sil Connell RDCS  Primary Physician:  Naveen Monsivais MD  Referring Physician:  Nir Whiteside DO  Group:  [facility]  Interpreting Physician:  Avelino Guerra MD    SUMMARY    LEFT VENTRICLE:  Systolic function was normal by visual assessment. Ejection fraction was estimated to be 60 %.  Although no diagnostic regional wall motion abnormality was identified, this possibility cannot be completely excluded on the basis of this study.  Features were consistent with a pseudonormal left ventricular filling pattern, with concomitant abnormal relaxation and increased filling pressure (grade 2 diastolic dysfunction).    RIGHT VENTRICLE:  Systolic pressure was mildly increased. Estimated peak pressure was 38 mmHg.    LEFT ATRIUM:  The atrium was mildly dilated.    MITRAL VALVE:  There was mild regurgitation.    TRICUSPID VALVE:  There was mild regurgitation.    PULMONIC VALVE:  There was mild regurgitation.    HISTORY: PRIOR HISTORY: Afib, Hypertension    PROCEDURE: The procedure was performed at the bedside. This was a routine study. The transthoracic approach was used. The study included complete 2D imaging, M-mode, complete spectral Doppler, and color Doppler. The heart rate was 66 bpm,  at the start of the study. Images were obtained from the parasternal, apical, subcostal, and suprasternal notch acoustic windows. Image quality was adequate.    LEFT VENTRICLE: Size was normal. Systolic function was normal by visual assessment. Ejection fraction was estimated to be 60 %. Although no diagnostic regional wall motion abnormality was identified, this possibility cannot be completely  excluded on the basis of this study. Wall thickness was normal. DOPPLER: The ratio of early ventricular filling to atrial contraction velocities was within the  normal range. Features were consistent with a pseudonormal left ventricular  filling pattern, with concomitant abnormal relaxation and increased filling pressure (grade 2 diastolic dysfunction).    RIGHT VENTRICLE: The size was normal. Systolic function was normal. DOPPLER: Systolic pressure was mildly increased. Estimated peak pressure was 38 mmHg.    LEFT ATRIUM: The atrium was mildly dilated.    RIGHT ATRIUM: Size was normal.    MITRAL VALVE: Valve structure was normal. There was normal leaflet separation. DOPPLER: The transmitral velocity was within the normal range. There was no evidence for stenosis. There was mild regurgitation.    AORTIC VALVE: The valve was trileaflet. Leaflets exhibited normal thickness and normal cuspal separation. DOPPLER: Transaortic velocity was within the normal range. There was no evidence for stenosis. There was no regurgitation.    TRICUSPID VALVE: The valve structure was normal. There was normal leaflet separation. DOPPLER: The transtricuspid velocity was within the normal range. There was no evidence for stenosis. There was mild regurgitation.    PULMONIC VALVE: Leaflets exhibited normal thickness, no calcification, and normal cuspal separation. DOPPLER: The transpulmonic velocity was within the normal range. There was mild regurgitation.    PERICARDIUM: There was no pericardial effusion.    AORTA: The root exhibited normal size.    SYSTEMIC VEINS: IVC: The inferior vena cava was normal in size and course. Respirophasic changes were normal.    SYSTEM MEASUREMENT TABLES    2D  %FS: 29.23 %  AV Diam: 2.59 cm  EDV(Teich): 122.4 ml  EF(Teich): 55.78 %  ESV(Teich): 54.13 ml  IVSd: 1 cm  LA Area: 21.32 cm2  LA Diam: 4.13 cm  LVEDV MOD A4C: 66.53 ml  LVEF MOD A4C: 72.63 %  LVESV MOD A4C: 18.21 ml  LVIDd: 5.07 cm  LVIDs: 3.59 cm  LVLd A4C: 7.37 cm  LVLs A4C: 5.72 cm  LVPWd: 0.96 cm  RA Area: 15.81 cm2  RVIDd: 2.78 cm  SV MOD A4C: 48.32 ml  SV(Teich): 68.27 ml    CW  TR MaxP.84  mmHg  TR Vmax: 2.91 m/s    MM  TAPSE: 1.63 cm    PW  E': 0.07 m/s  E/E': 11.94  MV A Chaz: 0.73 m/s  MV Dec [place]: 3.79 m/s2  MV DecT: 216.12 ms  MV E Chaz: 0.82 m/s  MV E/A Ratio: 1.12  MV PHT: 62.67 ms  MVA By PHT: 3.51 cm2    [employer] Accredited [facility]    Prepared and electronically signed by    Avelino Guerra MD  Signed 17-Sep-2018 08:58:26       No results found for this or any previous visit.    This note was completed in part utilizing [employer] direct voice recognition software.   Grammatical errors, random word insertion, spelling mistakes, and incomplete sentences may be an occasional consequence of the system secondary to software limitations, ambient noise and hardware issues. At the time of dictation, efforts were made to edit, clarify and /or correct errors.  Please read the chart carefully and recognize, using context, where substitutions have occurred.  If you have any questions or concerns about the context, text or information contained within the body of this dictation, please contact myself, the provider, for further clarification

## 2024-01-11 NOTE — PATIENT INSTRUCTIONS
Low-Sodium Diet   AMBULATORY CARE:   A low-sodium diet  limits foods that are high in sodium (salt). You will need to follow a low-sodium diet if you have high blood pressure, kidney disease, or heart failure. You may also need to follow this diet if you have a condition that is causing your body to retain (hold) extra fluid. You may need to limit the amount of sodium you eat in a day to 1,500 to 2,000 mg. Ask your healthcare provider how much sodium you can have each day.  How to use food labels to choose foods that are low in sodium:  Read food labels to find the amount of sodium they contain. The amount of sodium is listed in milligrams (mg). The % Daily Value (DV) column tells you how much of your daily needs are met by 1 serving of the food for each nutrient listed. Choose foods that have less than 5% of the DV of sodium. These foods are considered low in sodium. Foods that have 20% or more of the DV of sodium are considered high in sodium. Some food labels may also list any of the following terms that tell you about the sodium content in the food:  Sodium-free:  Less than 5 mg in each serving    Very low sodium:  35 mg of sodium or less in each serving    Low sodium:  140 mg of sodium or less in each serving    Reduced sodium:  At least 25% less sodium in each serving than the regular type    Light in sodium:  50% less sodium in each serving    Unsalted or no added salt:  No extra salt is added during processing (the food may still contain sodium)       Foods to avoid:  Salty foods are high in sodium. You should avoid the following:  Processed foods:      Mixes for cornbread, biscuits, cake, and pudding     Instant foods, such as potatoes, cereals, noodles, and rice     Packaged foods, such as bread stuffing, rice and pasta mixes, snack dip mixes, and macaroni and cheese     Canned foods, such as canned vegetables, soups, broths, sauces, and vegetable or tomato juice    Snack foods, such as salted chips,  popcorn, pretzels, pork rinds, salted crackers, and salted nuts    Frozen foods, such as dinners, entrees, vegetables with sauces, and breaded meats    Sauerkraut, pickled vegetables, and other foods prepared in brine    Meats and cheeses:      Smoked or cured meat, such as corned beef, huff, ham, hot dogs, and sausage    Canned meats or spreads, such as potted meats, sardines, anchovies, and imitation seafood    Deli or lunch meats, such as bologna, ham, turkey, and roast beef    Processed cheese, such as American cheese and cheese spreads    Condiments, sauces, and seasonings:      Salt (¼ teaspoon of salt contains 575 mg of sodium)    Seasonings made with salt, such as garlic salt, celery salt, onion salt, and seasoned salt    Regular soy sauce, barbecue sauce, teriyaki sauce, steak sauce, Worcestershire sauce, and most flavored vinegars    Canned gravy and mixes     Regular condiments, such as mustard, ketchup, and salad dressings    Pickles and olives    Meat tenderizers and monosodium glutamate (MSG)    Foods to include:  Read the food label to find the exact amount of sodium in each serving.  Bread and cereal:  Try to choose breads with less than 80 mg of sodium per serving.     Bread, roll, esa, tortilla, or unsalted crackers.    Ready-to-eat cereals with less than 5% DV of sodium (examples include shredded wheat and puffed rice)    Pasta    Vegetables and fruits:      Unsalted fresh, frozen, or canned vegetables    Fresh, frozen, or canned fruits    Fruit juice    Dairy:  One serving has about 150 mg of sodium.    Milk, all types    Yogurt    Hard cheese, such as cheddar, Swiss, [place] ananya, or mozzarella    Meat and other protein foods:  Some raw meats may have added sodium.     Plain meats, fish, and poultry     Eggs    Other foods:      Homemade pudding    Unsalted nuts, popcorn, or pretzels    Unsalted butter or margarine    Ways to get less sodium:  If you are used to the flavor of salt, it will  take time to get used to low-sodium foods. Your healthcare provider or dietitian can help you create a plan for lowering sodium. The plan will be specific to your needs and your family's needs. You may focus on 1 or 2 changes each week, such as the following:  Add spices and herbs to foods instead of salt during cooking.  Use salt-free seasonings to add flavor to foods. Examples include onion powder, garlic powder, basil, gerber powder, paprika, and parsley. Try lemon or lime juice or vinegar to give foods a tart flavor. Use hot peppers, pepper, or cayenne pepper to add a spicy flavor.    Do not keep a salt shaker at your kitchen table.  This may help keep you from adding salt to food at the table. A teaspoon of salt has 2,300 mg of sodium. It may take time to get used to enjoying the natural flavor of food instead of adding salt. Talk to your healthcare provider before you use salt substitutes. Some salt substitutes have a high amount of a chemical called potassium chloride. This form of potassium needs to be avoided if you have kidney disease.    Choose low-sodium foods at restaurants.  Meals from restaurants are often high in sodium. Some restaurants have nutrition information on the menu that tells you the amount of sodium in their foods. If possible, ask for your food to be prepared with less, or no salt.    Shop for unsalted or low-sodium foods and snacks at the grocery store.  Examples include unsalted or low-sodium broths, soups, and canned vegetables. Choose fresh or frozen vegetables instead. Choose unsalted nuts or seeds or fresh fruits or vegetables as snacks. Read food labels and choose salt-free, very low-sodium, or low-sodium foods. You can also rinse canned vegetables under running water to remove extra sodium before you cook them.    © Copyright [employer] 2023 Information is for End User's use only and may not be sold, redistributed or otherwise used for commercial purposes.  The above information is  an  only. It is not intended as medical advice for individual conditions or treatments. Talk to your doctor, nurse or pharmacist before following any medical regimen to see if it is safe and effective for you.  DASH Eating Plan   WHAT YOU NEED TO KNOW:   The DASH (Dietary Approaches to Stop Hypertension) Eating Plan is designed to help prevent or lower high blood pressure. It can also help to lower LDL (bad) cholesterol and decrease your risk for heart disease. The plan is low in sodium, sugar, unhealthy fats, and total fat. It is high in potassium, calcium, magnesium, and fiber. These nutrients are added when you eat more fruits, vegetables, and whole grains. With the DASH eating plan, you need to eat a certain number of servings from each food group. This will help you get enough of certain nutrients and limit others. The amount of servings you should eat depends on how many calories you need. Your dietitian can help you create meal plans with the right number of servings for each food group.       DISCHARGE INSTRUCTIONS:   What you need to know about sodium:  Your dietitian will tell you how much sodium is safe for you to have each day. People with high blood pressure should have no more than 1,500 to 2,300 mg of sodium in a day. A teaspoon (tsp) of salt has 2,300 mg of sodium. This may seem like a difficult goal, but small changes to the foods you eat can make a big difference. Your healthcare provider or dietitian can help you create a meal plan that follows your sodium limit.  Read food labels.  Food labels can help you choose foods that are low in sodium. The amount of sodium is listed in milligrams (mg). The % Daily Value (DV) column tells you how much of your daily needs are met by 1 serving of the food for each nutrient listed. Choose foods that have less than 5% of the DV of sodium. These foods are considered low in sodium. Foods that have 20% or more of the DV of sodium are considered high  in sodium. Avoid foods that have more than 300 mg of sodium in each serving. Choose foods that say low-sodium, reduced-sodium, or no salt added on the food label.         Limit added salt.  Do not salt food at the table if you add salt when you cook. Use herbs and spices, such as onions, garlic, and salt-free seasonings to add flavor. Try lemon or lime juice or vinegar to add a tart flavor. Use hot peppers or a small amount of hot pepper sauce to add a spicy flavor. Limit foods high in added salt, such as the following:    Seasonings made with salt, such as garlic salt, celery salt, onion salt, seasoned salt, meat tenderizers, and monosodium glutamate (MSG)    Miso soup and canned or dried soup mixes    Regular soy sauce, barbecue sauce, teriyaki sauce, steak sauce, Worcestershire sauce, and most flavored vinegars    Snack foods, such as salted chips, popcorn, pretzels, pork rinds, salted crackers, and salted nuts    Frozen foods, such as dinners, entrees, vegetables with sauces, and breaded meats    Ask about salt substitutes.  Ask your healthcare provider if you may use salt substitutes. Some salt substitutes have ingredients that can be harmful if you have certain health conditions.    Choose foods carefully at restaurants.  Meals from restaurants, especially fast food restaurants, are often high in sodium. Some restaurants have nutrition information that tells you the amount of sodium in their foods. Ask to have your food prepared with less, or no salt.    What you need to know about fats:  Healthy fats include unsaturated fats and omega-3 fatty acids. Unhealthy fats include saturated fats and trans fats.  Include healthy fats, such as the following:      Cooking oils, such as soybean, canola, olive, or sunflower    Fatty fish, such as salmon, tuna, mackerel, or sardines    Flaxseed oil or ground flaxseed    ½ cup of cooked beans, such as black beans, kidney beans, or campbell beans    1½ ounces of low-sodium nuts,  such as almonds or walnuts    Low-sugar, low-sodium peanut butter    Seeds such as nubia seeds or sunflower seeds       Limit or do not have unhealthy fats, such as the following:      Foods that contain fat from animals, such as fatty meats, whole milk, butter, and cream    Shortening, stick margarine, palm oil, and coconut oil    Full-fat or creamy salad dressing    Creamy soup    Crackers, chips, and baked goods made with margarine or shortening    Foods that are fried in unhealthy fats    Gravy and sauces, such as Manny or cheese sauces    What you need to know about carbohydrates (carbs):  All carbs break down into sugar. Complex carbs contain more fiber than simple carbs. This means complex carbs go into the bloodstream more slowly and cause less of a blood sugar spike. Try to include more complex carbs and fewer simple carbs.  Include complex carbs, such as the followin slice of whole-grain bread    1 ounce of dry cereal that does not contain added sugar    ½ cup of cooked oatmeal    2 ounces of cooked whole-grain pasta    ½ cup of cooked brown rice    Limit or do not have simple carbs, such as the following:      Baked goods, such as doughnuts, pastries, and cookies    Mixes for cornbread and biscuits    White rice and pasta mixes, such as boxed macaroni and cheese    Instant and cold cereals that contain sugar    Jelly, jam, and ice cream that contain sugar    Condiments such as ketchup    Drinks high in sugar, such as soft drinks, lemonade, and fruit juice    What you need to know about vegetables and fruits:  Vegetables and fruits can be fresh, frozen, or canned. If possible, try to choose low-sodium canned options.  Include a variety of vegetables and fruits, such as the followin medium apple, pear, or peach (about ½ cup chopped)    ½ small banana    ½ cup berries, such as blueberries, strawberries, or blackberries    1 cup of raw leafy greens, such as lettuce, spinach, kale, or  anayeli greens    ½ cup of frozen or canned (no added salt) vegetables, such as green beans    ½ cup of fresh, frozen, or canned fruit (canned in light syrup or fruit juice)    ½ cup of vegetable or fruit juice    Limit or do not have vegetables and fruits made in the following ways:      Frozen fruit such as cherries that have added sugar    Fruit in cream or butter sauce    Canned vegetables that are high in sodium    Sauerkraut, pickled vegetables, and other foods prepared in brine    Fried vegetables or vegetables in butter or high-fat sauces    What you need to know about protein foods:   Include lean or low-fat protein foods, such as the following:      Poultry (chicken, turkey) with no skin    Fish (especially fatty fish, such as salmon, fresh tuna, or mackerel)    Lean beef and pork (loin, round, extra lean hamburger)    Egg whites and egg substitutes    1 cup of nonfat (skim) or 1% milk    1½ ounces of fat-free or low-fat cheese    6 ounces of nonfat or low-fat yogurt    Limit or do not have high-fat protein foods, such as the following:      Smoked or cured meat, such as corned beef, huff, ham, hot dogs, and sausage    Canned beans and canned meats or spreads, such as potted meats, sardines, anchovies, and imitation seafood    Deli or lunch meats, such as bologna, ham, turkey, and roast beef    High-fat meat (T-bone steak, regular hamburger, and ribs)    Whole eggs and egg yolks    Whole milk, 2% milk, and cream    Regular cheese and processed cheese    Other guidelines to follow:   Maintain a healthy weight.  Your risk for heart disease is higher if you have extra weight. Ask your healthcare provider what a healthy weight is for you. Your provider may suggest that you lose weight. You can lose weight by eating fewer calories and foods that have added sugars and fat. The DASH meal plan can help you do this. Decrease calories by eating smaller portions at each meal and fewer snacks. Ask your provider for  more information about how to lose weight.    Exercise regularly.  Regular exercise can help you reach or maintain a healthy weight. Regular exercise can also help decrease your blood pressure and improve your cholesterol levels. Get 30 minutes or more of moderate exercise each day of the week. To lose weight, get at least 60 minutes of exercise. Talk to your healthcare provider about the best exercise program for you.         Limit alcohol.  Women should limit alcohol to 1 drink a day. Men should limit alcohol to 2 drinks a day. A drink of alcohol is 12 ounces of beer, 5 ounces of wine, or 1½ ounces of liquor.    For more information:   [facility] [place]  [facility]  [address] [address]  [place] [address]  Phone: [phone]  Web Address: http://www.nhlbi.nih.gov/health/infoctr/index.htm    © Copyright [employer] 2023 Information is for End User's use only and may not be sold, redistributed or otherwise used for commercial purposes.  The above information is an  only. It is not intended as medical advice for individual conditions or treatments. Talk to your doctor, nurse or pharmacist before following any medical regimen to see if it is safe and effective for you.

## 2024-01-11 NOTE — PROGRESS NOTES
Cardiology  Hospital follow-up   office Visit Note  Farnaz Martin   78 y.o.   female   MRN: [id number]  [facility]  [address]  [address]  [facility] [address] [phone]    PCP: Naveen Monsivais MD  Cardiologist: Dr. Cunningham            Summary of recommendations  48 ounces of fluid per day  If she gains.  Salt restricted diet.  3 pounds in 24 hours she can take an extra dose of Lasix 20 mg.  if her heart rate becomes elevated at 120 again, she can take an additional diltiazem 120 mg a day as needed  Follow up will be scheduled with Dr Cunningham 3/4/24, nonfasting BMP, BNP prior to        Assessment/plan  Palpitations.  Recent hospital adm 1/5-1/6/24. Pacer interrogation and telemetry revealed sinus tachycardia.  This was in the setting of a recent COVID booster.  Evaluated by Cardiology.  She has had no recurrence since discharge  Workup unremarkable   if her heart rate becomes elevated at 120 again, she can take an additional diltiazem 120 mg a day as needed.  She has not needed to take 1  Chronic HFpEF  Wt Readings from Last 3 Encounters:   01/11/24 73.4 kg (161 lb 12.8 oz)   01/06/24 74.6 kg (164 lb 7.4 oz)   01/04/24 71.7 kg (158 lb)   --beta-blocker: Toprol 100 mg every 12h  --Diuretic: Lasix 20 mg daily.  In the past, she was taking this as needed.  More recently, she has been taking this daily and has been feeling better  --ACE/ARB/ARNI: Losartan 50 mg twice daily  --MRA:   --SLGT2I  No  --2 g sodium diet, 1800 cc fluid restriction. Daily weights.  If she gains 3 pounds in 24 hours or 5 pounds in 5 days, she can take an extra dose of Lasix 20 mg  Chronic hyponatremia.  Advised fluid restriction of 48 ounces a day.  Will repeat a nonfasting BMP BNP in a short interval  Paroxysmal Atrial fibrillation/Flutter. Symptomatic  Initial diagnosis of A-fib was 2016  Sleep study negative for sleep apnea  2016  S/P cryoablation with pulmonary vein isolation 3/12/21-  developed symptomatic bradycardia and underwent subsequent MDT DC ppm implant (3/2021).  Initiated on flecainide after  S/P DCCV 1/2022.  Placed on amiodarone.    S/P DCCV 3/2022  June 2022: Hospitalized for heart failure.  Digoxin was started  S/P Pulmonary vein isolation, rare isolation of right vein, and a straight modification with posterior wall modification.  Successful CTI flutter ablation   by Dr Muhammad  8/2/22 [place] she was discharged on amiodarone 200 mg daily..  Digoxin was discontinued.  Toprol was decreased to 75 mg twice daily.  Amiodarone DCd 3 months post procedure, per EP  S/P SHANNAN/DCCV 8/7/23  On OAC with Eliquis 5 mg twice daily  Rate control with diltiazem CD1 20 mg daily and metoprolol succinate 100 mg every 12  MORENITA with flecainide 100 mg every 12.  Initiated following cardioversion  Allergies: Sotalol- prolonged QT leading to  torsades de pointes  SSS; S/P PPM 3/2021  Interrogation 1/5/24:Battery adequate.  AP 91.1%  7.3%.  Presenting rhythm sinus tachycardia 122 bpm.  Hypertension, essential.  /81  losartan 50 mg twice daily, diltiazem  mg every 12, Toprol 100 mg every 12, low-dose Lasix  Hyperlipidemia, on ezetimibe 10 mg daily.  Not on statin therapy for unclear reasons.  She does have diabetes mellitus.  Not further addressed today.  1/9/2024: Calculated LDL 64, non-HDL 77   Latest Reference Range & Units 04/01/21 13:45 07/03/23 08:23 01/09/24 09:11   Cholesterol See Comment mg/dL 176 163 123   Triglycerides See Comment mg/dL 112 68 63   HDL >=50 mg/dL 66 53 46 (L)   Non-HDL Cholesterol mg/dl 110  77   LDL Calculated 0 - 100 mg/dL 88 96 64   Thyroid nodule, status post thyroid lobectomy 12/16/20 by Dr. Bhatt  Type 2 diabetes mellitus without long-term use of insulin.  Hemoglobin A1c 7.6   Latest Reference Range & Units 07/11/22 08:41 01/31/23 08:58 07/03/23 08:23 01/09/24 09:11   Hemoglobin A1C Normal 4.0-5.6%; PreDiabetic 5.7-6.4%; Diabetic >=6.5%; Glycemic control for adults  with diabetes <7.0% % 7.3 (H) 6.5 (H) 6.6 (H) 7.6 (H)   MGUS  Meningioma, diagnosed 8/20/2022. Follows with Dr Juanito KIM, at [facility]  Cardiac testing  TTE  September 2018. EF 60%.  Grade 2 diastolic dysfunction.  RV systolic pressure mildly increased, peak pressure 38 mm Hg.  Left atrium mildly dilated.  Mild MR. Mild TR.  ambulatory Holter monitor 10/15/20.  Five days 20 minutes.  Average heart rate 53 beats per minute.  For triggers.  Five episodes of SVT longest 7 beats at 110 beats per minute  SPECT 10/22: Negative exercise pharmacological stress test   TTE 6/2023.  EF 55%.  Wall motion normal.  Grade 2 DD.  RV normal size and function.  Pacer wires present.  Mild LAE.  Mild AI.  Moderate MR.  Mild to moderate TR.  RVSP is mildly to severely elevated.  RVSP estimated at 64 mmHg.  SHANNAN 8/7/23. EF 65%. No RWMA.  RV normal size and function.  Moderate LAE.  No thrombus.  No PFO.  LA a dilated.  Normal function, no thrombus.  Moderate TR  TTE ( limited) 12/5/2023: EF 55%, no WMA, Grade 1 DD, normal RV function, moderate MR, moderate TR with PASP of 53 mmHg.                    FABIO Martin is a 75-year-old female with paroxysmal atrial fibrillation essential hypertension and mixed hyperlipidemia.  She follows with cardiologist Dr. Cunningham.  She was last seen December 9th.  She is found to have a thyroid nodule and was scheduled to undergo surgery.  She was seen for preoperative scan point.  She was doing well.  She was advised to hold the Eliquis for few days prior to her procedure.  She was scheduled to come back to see him in 8 months      12/21/2020  Acute visit.  On the 16th of December she had a thyroid procedure/lobectomy.  Two days later she began with palpitations.  She reports she was told that she could take 1 and half pills of her sotalol, 180 mg twice daily if needed by her cardiologist..  She has been doing that for a few days.  Despite this, she remains in AFib today.  Her rate is 112 beats per  minute.  Case was discussed briefly in the office with Dr. Ruggiero who is in the office with me today.  Plan:  Check TSH, free T4.  If abnormal, refer to PCP.  Will add metoprolol succinate 25 mg b.i.d. Continue sotalol at 120 mg q.12 hours.  Will discontinue amlodipine for blood pressure room.  Continue anticoagulation at 5 mg b.i.d..  EKG in 1 week.  If her TSH is normal and she remains in AFib we could proceed with a SHANNAN guided cardioversion.    Interval history  She has followed regular with Dr. Cunningham.  She has been followed by EP Dr. Omalley and EP at [place]    She developed a prolonged QTc and bradycardia on sotalol.  This was discontinued indefinitely  S/P cryoablation with pulmonary vein isolation 3/12/21- developed symptomatic bradycardia and underwent subsequent MDT DC PPM implant (3/2021).  Initiated on flecainide postprocedure  S/P DCCV 1/2022.  Placed on amiodarone.    S/P DCCV 3/2022  June 2022: Hospitalized for heart failure.  Digoxin was started  8/22 dx meningioma  8/2022: S/P Pulmonary vein isolation, rare isolation of right vein, and a straight modification with posterior wall modification.  Successful CTI flutter ablation   by Dr Muhammad  8/2/22 [place] she was discharged on amiodarone 200 mg daily..  Digoxin was discontinued.  Toprol was decreased to 75 mg twice daily  S/P SHANNAN/DCCV 8/7/23  On OAC with Eliquis 5 mg twice daily  Rate control with diltiazem CD1 20 mg daily and metoprolol succinate 100 mg every 12  MORENITA with flecainide 100 mg every 12  10/12/22:  telemedicine visit EP- Dr Raza ([place])  Per his note:   Farnaz is doing well from an arrhythmia standpoint has had no recurrent atrial fibrillation since her ablation. Her most recent ischemia evaluation was normal and she is normal ejection fraction so we discussed that if she does need to move forward with meningioma surgery she can have this done in hold her anticoagulation for up to 3 days prior to the procedure and restart when appropriate  postoperatively. We also discussed that she should discontinue her amiodarone at 3 months post procedure and if she does need an anti arrhythmia medication then either sotalol or dofetilide would be better options. Otherwise she will continue with remote device checks and follow-up in her primary cardiologist's office and I made no changes to her medication at this time. However I did discuss she can discontinue her 1 month course of baby aspirin as well as reflux medicine.    Adm  8/2023 :symptomatic A-fib  Followed by Cardiology   started on Cardizem drip, cardiology consulted and added flecainide  Weaned off Cardizem gtt   Likely in the setting postoperative trigger with somewhat superimposed uncontrolled pain  Flecainide and Cardizem did not resolve pt's afib, cardioversion procedure was done and successful (8/7/23)  DC cardiac medications: Amlodipine 5 mg daily, Eliquis 5 twice daily, Dilt 120 mg daily, Zetia 10 mg daily, flecainide 100 mg every 12, Lasix 20 as needed, losartan 50 twice daily  Toprol 100 every 12      1/2024: Maintenance cardiac medications include:  Eliquis 5 mg BID, diltiazem 120 mg BID, Zetia 10 mg daily, flecainide 100 mg BID, Toprol- mg BID, Lasix 20 mg daily, losartan 50 mg BID.    Adm 1/5-1/6/24  Palps-->.  Sinus tach  CC: Palpitations, all day  Notably she had a recent COVID booster 2 days prior  Pacemaker interrogation: Sinus tachycardia--advised ED evaluation  Tachycardia, -130  CTA chest: No PE  Labs remarkable for sodium 129  HS troponin negative x 3  CBC stable, TFTs stable  She was felt to be euvolemic in regard to chronic HFpE  Evaluated by cardiology  EKG-1/5/2024-V paced rhythm at 130 bpm.  When compared to the EKG from 8/7/2023 atrial paced rhythm at 66 bpm was noted.  Telemetry - Sinus tachycardia with ventricular paced rhythm with multiple runs of NSVT.  Currently ventricular paced in the 60s  Per cardiology noted:  -->Her tachycardia, persistent at 120 to 130  bpm, with a sudden drop to 60 beats paced, argues against sinus tachycardia, rather atrial tachycardia as a possible side effect of the recent COVID booster  Patient not significantly symptomatic, but numerous nonspecific symptoms possibly as a result of her recent COVID booster.  She looks quite stable from a cardiac perspective, discharge is reasonable, if her heart rate becomes elevated at 120 again, she can take an additional diltiazem 120 mg a day as needed.  Her daughter was in the room and was agreeable to the plan.       1/11/24  PMH:  PAF/SSS. S/P AF ablation x 2, SHANNAN/DCCV ( 8/2023),S/P PPM. T2DM. HTN. HL. CKD3. P Htn.  Recent palps- found to be in ST after Covid booster    Hospital follow-up.  Her daughter Coral participated by telephone  Farnaz tells me she is having rare fluttering in her chest 1-2 times a week, self-limiting.  She has not needed to take any extra Cardizem at all.  She has been taking the Lasix 20 mg daily regularly or recently.  Her cough has gone away.  She has chronic JOHNSON no worse than her usual.  She has steps in her house but rarely goes down them.  She does weigh herself regularly, ranging from 1 53-1 57.  In the office today she is 161 pounds.  She denies lightheadedness or dizziness.  She is abiding by a she has been hydrating, around 64 ounces of fluid a day.  We reviewed her most recent labs.  She has chronic hyponatremia  Going forward, I recommend limiting her fluid to 48 ounces a day.  Will repeat labs in short interval before returning to see her cardiologist in a short interval    In addition, she has been taking Toprol 25 mg tabs, 4 tabs twice daily.  Salt restricted diet.  At her next refill will transition to 100 mg every 12         Assessment  Diagnoses and all orders for this visit:    Hospital discharge follow-up    Chronic diastolic CHF (congestive heart failure) (HCC)  -     B-Type Natriuretic Peptide(BNP); Future    Paroxysmal atrial fibrillation (HCC)  -      metoprolol succinate (TOPROL-XL) 100 mg 24 hr tablet; Take 1 tablet (100 mg total) by mouth every 12 (twelve) hours    S/P placement of cardiac pacemaker    Hyponatremia  -     Basic metabolic panel; Future    Stage 3b chronic kidney disease (HCC)    Mixed hyperlipidemia    Sinus tachycardia    Essential hypertension            Past Medical History:   Diagnosis Date    Actinic keratosis     last assessed - 06Jun2014    Arthritis     Atrial fibrillation with rapid ventricular response ([facility])     last assessed - 26Apr2016    Basal cell carcinoma     Benign colon polyp     last assessed - 27Apr2015    CHF (congestive heart failure) ([facility]) 06/2022    Disease of thyroid gland     Effusion of knee joint right     Resolved - 19Apr2016    Esophageal reflux     Fibromyalgia     last assessed - [id number]    Fibromyalgia, primary     GERD (gastroesophageal reflux disease)     Hypertension     Palpitations     last assessed - 30Apr2013    Peroneal tendonitis, right     last assessed - 02Oct2013    Right lumbar radiculopathy 03/17/2016    Thyroid cancer ([facility])     Thyroid nodule     Trochanteric bursitis of left hip 03/09/2018       Review of Systems   Constitutional: Negative for chills.   Cardiovascular:  Positive for palpitations. Negative for chest pain, claudication, cyanosis, dyspnea on exertion, irregular heartbeat, leg swelling, near-syncope, orthopnea, paroxysmal nocturnal dyspnea and syncope.   Respiratory:  Positive for shortness of breath. Negative for cough.    Gastrointestinal:  Negative for heartburn and nausea.   Neurological:  Negative for dizziness, focal weakness, headaches, light-headedness and weakness.   All other systems reviewed and are negative.      Allergies   Allergen Reactions    Sotalol Other (See Comments)     Prolonged QTc leading to torsades de pointes     Penicillins Other (See Comments)     As a child calcium deposit in the arm     Ace Inhibitors GI Intolerance     Did feel well on it     Omeprazole Abdominal Pain, Rash and Vomiting     stomach pain, vomiting, rash     .    Current Outpatient Medications:     apixaban (ELIQUIS) 5 mg, Take 1 tablet (5 mg total) by mouth 2 (two) times a day, Disp: 42 tablet, Rfl: 0    ascorbic acid (VITAMIN C) 500 mg tablet, Take 500 mg by mouth daily , Disp: , Rfl:     Cetirizine HCl (ZyrTEC Allergy) 10 MG CAPS, Take 10 mg by mouth in the morning, Disp: , Rfl:     Cholecalciferol 50 MCG (2000 UT) CAPS, Take 2,000 Units by mouth 2 (two) times a day, Disp: , Rfl:     Chromium Picolinate (CHROMIUM PICOLATE PO), Take 1 tablet by mouth daily, Disp: , Rfl:     diltiazem (CARDIZEM CD) 120 mg 24 hr capsule, Take 1 capsule (120 mg total) by mouth 2 (two) times a day You may take 1 additional capsule daily (120 mg) if you have palpitations and elevated heart rate consistent with your atrial flutter/fibrillation. Max 360 mg daily., Disp: 90 capsule, Rfl: 0    DULoxetine (CYMBALTA) 30 mg delayed release capsule, take 1 capsule by mouth once daily, Disp: 30 capsule, Rfl: 2    ezetimibe (ZETIA) 10 mg tablet, take 1 tablet by mouth once daily, Disp: 90 tablet, Rfl: 3    flecainide (TAMBOCOR) 100 mg tablet, Take 1 tablet (100 mg total) by mouth 2 (two) times a day, Disp: 24717 tablet, Rfl: 3    furosemide (LASIX) 20 mg tablet, Take 1 tablet (20 mg total) by mouth daily take 2 tablet by mouth daily if needed for shortness of breath WEIGHT GAIN 3 LBS IN 1 DAY OR LOWER LEG SWELLING, Disp: 90 tablet, Rfl: 3    gabapentin (NEURONTIN) 300 mg capsule, Take 1 capsule (300 mg total) by mouth daily at bedtime, Disp: 90 capsule, Rfl: 1    losartan (COZAAR) 50 mg tablet, Take 1 tablet (50 mg total) by mouth 2 (two) times a day, Disp: 180 tablet, Rfl: 3    metoprolol succinate (TOPROL-XL) 100 mg 24 hr tablet, Take 1 tablet (100 mg total) by mouth every 12 (twelve) hours, Disp: , Rfl:     rOPINIRole (REQUIP) 1 mg tablet, Take 2 tablets (2 mg total) by mouth daily at bedtime, Disp: 180 tablet, Rfl:  1    Saccharomyces boulardii (Probiotic) 250 MG CAPS, Take 1 capsule by mouth daily, Disp: , Rfl:     TURMERIC PO, Take 1 capsule by mouth 2 (two) times a day, Disp: , Rfl:     zolpidem (AMBIEN) 10 mg tablet, Take 1 tablet (10 mg total) by mouth daily at bedtime as needed for sleep, Disp: 90 tablet, Rfl: 1    ipratropium (ATROVENT) 0.03 % nasal spray, 2 sprays into each nostril 3 (three) times a day Begin once per day, then progress to twice per day. Progress to three times a day as tolerated. (Patient not taking: Reported on 1/11/2024), Disp: 90 mL, Rfl: 3        Social History     Socioeconomic History    Marital status:      Spouse name: Not on file    Number of children: Not on file    Years of education: Not on file    Highest education level: Not on file   Occupational History    Not on file   Tobacco Use    Smoking status: Never    Smokeless tobacco: Never   Vaping Use    Vaping status: Never Used   Substance and Sexual Activity    Alcohol use: Not Currently    Drug use: Never    Sexual activity: Not Currently   Other Topics Concern    Not on file   Social History Narrative    Not on file     Social Determinants of Health     Financial Resource Strain: Patient Unable To Answer (12/19/2023)    Overall Financial Resource Strain (CARDIA)     Difficulty of Paying Living Expenses: Patient unable to answer   Food Insecurity: No Food Insecurity (1/5/2024)    Hunger Vital Sign     Worried About Running Out of Food in the Last Year: Never true     Ran Out of Food in the Last Year: Never true   Transportation Needs: No Transportation Needs (1/5/2024)    PRAPARE - Transportation     Lack of Transportation (Medical): No     Lack of Transportation (Non-Medical): No   Physical Activity: Not on file   Stress: Not on file   Social Connections: Not on file   Intimate Partner Violence: Not At Risk (7/31/2023)    Received from [facility]    Humiliation, Afraid, Rape, and Kick questionnaire     Fear of  Current or Ex-Partner: No     Emotionally Abused: No     Physically Abused: No     Sexually Abused: No   Housing Stability: Low Risk  (1/5/2024)    Housing Stability Vital Sign     Unable to Pay for Housing in the Last Year: No     Number of Places Lived in the Last Year: 1     Unstable Housing in the Last Year: No       Family History   Problem Relation Age of Onset    Heart disease Mother     Diabetes Mother     Heart disease Father     Coronary artery disease Father     Stroke Father         cerebrovascular accident    Heart attack Father         myocardial infarction    Sudden death Father         scd    Other Family         Back disorder    Coronary artery disease Family     Neuropathy Family     Osteoporosis Family     No Known Problems Daughter     No Known Problems Maternal Grandmother     No Known Problems Maternal Grandfather     No Known Problems Paternal Grandmother     No Known Problems Paternal Grandfather     Cancer Maternal Uncle     Breast cancer Maternal Aunt 65    No Known Problems Son     No Known Problems Maternal Aunt     No Known Problems Maternal Aunt     No Known Problems Maternal Aunt     No Known Problems Paternal Aunt     No Known Problems Paternal Aunt     Anuerysm Neg Hx     Clotting disorder Neg Hx     Arrhythmia Neg Hx     Heart failure Neg Hx        Physical Exam  Vitals and nursing note reviewed.   Constitutional:       General: She is not in acute distress.     Appearance: She is not diaphoretic.   HENT:      Head: Normocephalic and atraumatic.   Eyes:      Conjunctiva/sclera: Conjunctivae normal.   Cardiovascular:      Rate and Rhythm: Tachycardia present. Rhythm irregularly irregular.      Pulses: Intact distal pulses.      Heart sounds: Normal heart sounds.   Pulmonary:      Effort: Pulmonary effort is normal.      Breath sounds: Normal breath sounds.   Abdominal:      General: Bowel sounds are normal.      Palpations: Abdomen is soft.   Musculoskeletal:         General: Normal  "range of motion.      Cervical back: Normal range of motion and neck supple.   Skin:     General: Skin is warm and dry.   Neurological:      Mental Status: She is alert and oriented to person, place, and time.         Vitals: Blood pressure 132/81, pulse 63, height 5' 2\" (1.575 m), weight 73.4 kg (161 lb 12.8 oz), last menstrual period 02/01/1990, not currently breastfeeding.   Wt Readings from Last 3 Encounters:   01/11/24 73.4 kg (161 lb 12.8 oz)   01/06/24 74.6 kg (164 lb 7.4 oz)   01/04/24 71.7 kg (158 lb)         Labs & Results:  Lab Results   Component Value Date    WBC 5.31 01/09/2024    HGB 12.3 01/09/2024    HCT 37.5 01/09/2024    MCV 87 01/09/2024     01/09/2024     BNP   Date Value Ref Range Status   08/10/2023 153 (H) 0 - 100 pg/mL Final   08/04/2023 651 (H) 0 - 100 pg/mL Final   06/13/2022 928 (H) 0 - 100 pg/mL Final     No components found for: \"CHEM\"  Total CK   Date Value Ref Range Status   07/10/2014 100 0 - 175 U/L Final     Comment:     Total CK <131, CKMB not indicated  The above 1 analytes were performed by [address] [address]       Troponin I   Date Value Ref Range Status   01/08/2021 <0.02 <=0.04 ng/mL Final     Comment:     [employer] Chemistry analyzer 99% cutoff is > 0.04 ng/mL in network labs     o cTnI 99% cutoff is useful only when applied to patients in the clinical setting of myocardial ischemia   o cTnI 99% cutoff should be interpreted in the context of clinical history, ECG findings and possibly cardiac imaging to establish correct diagnosis.   o cTnI 99% cutoff may be suggestive but clearly not indicative of a coronary event without the clinical setting of myocardial ischemia.     01/08/2021 <0.02 <=0.04 ng/mL Final     Comment:     [employer] Chemistry analyzer 99% cutoff is > 0.04 ng/mL in network labs     o cTnI 99% cutoff is useful only when applied to patients in the clinical setting of myocardial ischemia   o cTnI 99% cutoff should be interpreted " in the context of clinical history, ECG findings and possibly cardiac imaging to establish correct diagnosis.   o cTnI 99% cutoff may be suggestive but clearly not indicative of a coronary event without the clinical setting of myocardial ischemia.     2021 <0.02 <=0.04 ng/mL Final     Comment:     Autovalidation override  [employer] Chemistry analyzer 99% cutoff is > 0.04 ng/mL in network labs     o cTnI 99% cutoff is useful only when applied to patients in the clinical setting of myocardial ischemia   o cTnI 99% cutoff should be interpreted in the context of clinical history, ECG findings and possibly cardiac imaging to establish correct diagnosis.   o cTnI 99% cutoff may be suggestive but clearly not indicative of a coronary event without the clinical setting of myocardial ischemia.       Results for orders placed during the hospital encounter of 09/15/18   Echo complete with contrast if indicated    [address] [place] [address]  [phone]    Transthoracic Echocardiogram  2D, M-mode, Doppler, and Color Doppler    Study date:  16-Sep-2018    Patient: TANISHA LEVINE  MR number: [id number]  Account number: [id number]  : 1945  Age: 73 years  Gender: Female  Status: Inpatient  Location: Bedside  Height: 63 in  Weight: 159.7 lb  BP: 130/ 88 mmHg    Indications: Atrial Fibrillation    Diagnoses: I48.0 - Atrial fibrillation    Sonographer:  Sil Connell RDCS  Primary Physician:  Naveen Monsivais MD  Referring Physician:  Nir Whiteside DO  Group:  [facility]  Interpreting Physician:  Avelino Guerra MD    SUMMARY    LEFT VENTRICLE:  Systolic function was normal by visual assessment. Ejection fraction was estimated to be 60 %.  Although no diagnostic regional wall motion abnormality was identified, this possibility cannot be completely excluded on the basis of this study.  Features were consistent with a pseudonormal left  ventricular filling pattern, with concomitant abnormal relaxation and increased filling pressure (grade 2 diastolic dysfunction).    RIGHT VENTRICLE:  Systolic pressure was mildly increased. Estimated peak pressure was 38 mmHg.    LEFT ATRIUM:  The atrium was mildly dilated.    MITRAL VALVE:  There was mild regurgitation.    TRICUSPID VALVE:  There was mild regurgitation.    PULMONIC VALVE:  There was mild regurgitation.    HISTORY: PRIOR HISTORY: Afib, Hypertension    PROCEDURE: The procedure was performed at the bedside. This was a routine study. The transthoracic approach was used. The study included complete 2D imaging, M-mode, complete spectral Doppler, and color Doppler. The heart rate was 66 bpm,  at the start of the study. Images were obtained from the parasternal, apical, subcostal, and suprasternal notch acoustic windows. Image quality was adequate.    LEFT VENTRICLE: Size was normal. Systolic function was normal by visual assessment. Ejection fraction was estimated to be 60 %. Although no diagnostic regional wall motion abnormality was identified, this possibility cannot be completely  excluded on the basis of this study. Wall thickness was normal. DOPPLER: The ratio of early ventricular filling to atrial contraction velocities was within the normal range. Features were consistent with a pseudonormal left ventricular  filling pattern, with concomitant abnormal relaxation and increased filling pressure (grade 2 diastolic dysfunction).    RIGHT VENTRICLE: The size was normal. Systolic function was normal. DOPPLER: Systolic pressure was mildly increased. Estimated peak pressure was 38 mmHg.    LEFT ATRIUM: The atrium was mildly dilated.    RIGHT ATRIUM: Size was normal.    MITRAL VALVE: Valve structure was normal. There was normal leaflet separation. DOPPLER: The transmitral velocity was within the normal range. There was no evidence for stenosis. There was mild regurgitation.    AORTIC VALVE: The valve was  trileaflet. Leaflets exhibited normal thickness and normal cuspal separation. DOPPLER: Transaortic velocity was within the normal range. There was no evidence for stenosis. There was no regurgitation.    TRICUSPID VALVE: The valve structure was normal. There was normal leaflet separation. DOPPLER: The transtricuspid velocity was within the normal range. There was no evidence for stenosis. There was mild regurgitation.    PULMONIC VALVE: Leaflets exhibited normal thickness, no calcification, and normal cuspal separation. DOPPLER: The transpulmonic velocity was within the normal range. There was mild regurgitation.    PERICARDIUM: There was no pericardial effusion.    AORTA: The root exhibited normal size.    SYSTEMIC VEINS: IVC: The inferior vena cava was normal in size and course. Respirophasic changes were normal.    SYSTEM MEASUREMENT TABLES    2D  %FS: 29.23 %  AV Diam: 2.59 cm  EDV(Teich): 122.4 ml  EF(Teich): 55.78 %  ESV(Teich): 54.13 ml  IVSd: 1 cm  LA Area: 21.32 cm2  LA Diam: 4.13 cm  LVEDV MOD A4C: 66.53 ml  LVEF MOD A4C: 72.63 %  LVESV MOD A4C: 18.21 ml  LVIDd: 5.07 cm  LVIDs: 3.59 cm  LVLd A4C: 7.37 cm  LVLs A4C: 5.72 cm  LVPWd: 0.96 cm  RA Area: 15.81 cm2  RVIDd: 2.78 cm  SV MOD A4C: 48.32 ml  SV(Teich): 68.27 ml    CW  TR MaxP.84 mmHg  TR Vmax: 2.91 m/s    MM  TAPSE: 1.63 cm    PW  E': 0.07 m/s  E/E': 11.94  MV A Chaz: 0.73 m/s  MV Dec [place]: 3.79 m/s2  MV DecT: 216.12 ms  MV E Chaz: 0.82 m/s  MV E/A Ratio: 1.12  MV PHT: 62.67 ms  MVA By PHT: 3.51 cm2    [employer] Accredited Echocardiography [facility]    Prepared and electronically signed by    Avelino Guerra MD  Signed 17-Sep-2018 08:58:26       No results found for this or any previous visit.    This note was completed in part utilizing [employer] direct voice recognition software.   Grammatical errors, random word insertion, spelling mistakes, and incomplete sentences may be an occasional consequence of the system secondary to  software limitations, ambient noise and hardware issues. At the time of dictation, efforts were made to edit, clarify and /or correct errors.  Please read the chart carefully and recognize, using context, where substitutions have occurred.  If you have any questions or concerns about the context, text or information contained within the body of this dictation, please contact myself, the provider, for further clarification

## 2024-01-12 ENCOUNTER — OFFICE VISIT (OUTPATIENT)
Dept: FAMILY MEDICINE CLINIC | Facility: CLINIC | Age: 79
End: 2024-01-12
Payer: MEDICARE

## 2024-01-12 VITALS
SYSTOLIC BLOOD PRESSURE: 128 MMHG | TEMPERATURE: 97.1 F | HEIGHT: 62 IN | WEIGHT: 159 LBS | DIASTOLIC BLOOD PRESSURE: 76 MMHG | BODY MASS INDEX: 29.26 KG/M2 | RESPIRATION RATE: 18 BRPM

## 2024-01-12 DIAGNOSIS — I34.0 NONRHEUMATIC MITRAL VALVE REGURGITATION: ICD-10-CM

## 2024-01-12 DIAGNOSIS — D47.2 MGUS (MONOCLONAL GAMMOPATHY OF UNKNOWN SIGNIFICANCE): ICD-10-CM

## 2024-01-12 DIAGNOSIS — E11.29 TYPE 2 DIABETES MELLITUS WITH MICROALBUMINURIA, WITHOUT LONG-TERM CURRENT USE OF INSULIN: ICD-10-CM

## 2024-01-12 DIAGNOSIS — I10 ESSENTIAL HYPERTENSION: ICD-10-CM

## 2024-01-12 DIAGNOSIS — R80.9 TYPE 2 DIABETES MELLITUS WITH MICROALBUMINURIA, WITHOUT LONG-TERM CURRENT USE OF INSULIN: ICD-10-CM

## 2024-01-12 DIAGNOSIS — I27.20 PULMONARY HYPERTENSION (HCC): ICD-10-CM

## 2024-01-12 DIAGNOSIS — Z95.0 S/P PLACEMENT OF CARDIAC PACEMAKER: ICD-10-CM

## 2024-01-12 DIAGNOSIS — J47.9 BRONCHIECTASIS WITHOUT COMPLICATION (HCC): ICD-10-CM

## 2024-01-12 DIAGNOSIS — I48.0 PAROXYSMAL ATRIAL FIBRILLATION (HCC): ICD-10-CM

## 2024-01-12 DIAGNOSIS — I50.32 CHRONIC DIASTOLIC CHF (CONGESTIVE HEART FAILURE) (HCC): ICD-10-CM

## 2024-01-12 DIAGNOSIS — R00.0 SINUS TACHYCARDIA: Primary | ICD-10-CM

## 2024-01-12 DIAGNOSIS — I35.1 NONRHEUMATIC AORTIC VALVE INSUFFICIENCY: ICD-10-CM

## 2024-01-12 DIAGNOSIS — R05.3 CHRONIC COUGH: ICD-10-CM

## 2024-01-12 DIAGNOSIS — E04.2 MULTIPLE THYROID NODULES: ICD-10-CM

## 2024-01-12 DIAGNOSIS — E87.1 HYPONATREMIA: ICD-10-CM

## 2024-01-12 PROBLEM — E11.22 TYPE 2 DIABETES MELLITUS WITH CHRONIC KIDNEY DISEASE, WITHOUT LONG-TERM CURRENT USE OF INSULIN, UNSPECIFIED CKD STAGE (HCC): Status: RESOLVED | Noted: 2022-09-19 | Resolved: 2024-01-12

## 2024-01-12 PROBLEM — D64.9 ANEMIA: Status: RESOLVED | Noted: 2023-08-04 | Resolved: 2024-01-12

## 2024-01-12 PROCEDURE — 99496 TRANSJ CARE MGMT HIGH F2F 7D: CPT | Performed by: FAMILY MEDICINE

## 2024-01-12 NOTE — PROGRESS NOTES
TCM Call       Date and time call was made  2024  3:02 PM    Hospital care reviewed  Records reviewed    Patient was hospitialized at  [facility]    Comment  Atrial fibrillation with rapid ventricular response     Date of Admission  24    Date of discharge  24    Diagnosis  Sinus Tachycardia    Disposition  Home    Were the patients medications reviewed and updated  Yes    Current Symptoms  None    Cough Severity  Mild    Weakness severity  Moderate    Fatigue severity  Moderate          TCM Call       Post hospital issues  None    Should patient be enrolled in anticoag monitoring?  No    Scheduled for follow up?  Yes    Patients specialists  Cardiologist    Cardiologist name  Dr. Sigifredo Hinojosa     Did you obtain your prescribed medications  Yes    Do you need help managing your prescriptions or medications  No    Why type of assitance do you need  Daughter helps with medication adherence.    Is transportation to your appointment needed  No    Specify why  No longer driving.    I have advised the patient to call PCP with any new or worsening symptoms  Herminia Child MA    Living Arrangements  Children    Support System  Children    The type of support provided  Emotional; Physical    Do you have social support  Yes, as much as I need    Are you recieving any outpatient services  No    What type of services  [facility]    Are you recieving home care services  No    Types of home care services  Home PT; Nurse visit    Are you using any community resources  No    Current waiver services  No    Have you fallen in the last 12 months  No    How many times  Once    Interperter language line needed  No    Counseling  Patient    Counseling topics  Prognosis; Activities of daily living                Name: Farnaz Martin      : 1945      MRN: [id number]  Encounter Provider: Naveen Monsivais MD  Encounter Date: 2024   Encounter department: [facility]    Assessment & Plan     1. Sinus tachycardia    2. Hyponatremia    3. Type 2 diabetes mellitus with microalbuminuria, without long-term current use of insulin   -     Hemoglobin A1C; Future; Expected date: 04/12/2024    4. Essential hypertension    5. Paroxysmal atrial fibrillation (HCC)    6. Chronic diastolic CHF (congestive heart failure) (HCC)    7. Pulmonary hypertension (HCC)    8. Nonrheumatic mitral valve regurgitation    9. Nonrheumatic aortic valve insufficiency    10. MGUS (monoclonal gammopathy of unknown significance)    11. Bronchiectasis without complication (HCC)  -     Ambulatory Referral to Pulmonology; Future    12. Chronic cough  -     Ambulatory Referral to Pulmonology; Future    13. Multiple thyroid nodules    14. S/P placement of cardiac pacemaker    Continue with current medications. Repeat BMP. Re type 2 DM patient opted to not start medication. A1c in 3 months. Pulmonary Medicine referral for chronic cough. Follow up with Cardiology        Subjective     TCM s/p admission 01/05/2024 to 01/06/2024 Dx palpitations associated with shortness of breath. History of AF w/ 2 prior ablations-s/p pacemaker.  Device interrogation 1/5/2024; sinus tach, AP 91%,  7% normal device function. Admission EKG Electronic ventricular pacemaker.Troponin negative. CTA no PE.  CBC normal. Hgb 12.5. CMP normal except for Na+ 129 and chloride 93. LFTs normal. TSH 3.644. Sinus tachycardia fluid responsive. She advised at discharge to use additional once per day Cardizem  mg as needed for tachycardia. Repeat out patient sodium 128. . Corrected Na+ 129. Now on fluid restriction.       Type 2 DM diet controlled.  01/2024 A1c  7.6 increased from 6.6. Urine albumin 21.2 on ARB. No DPN. Current with eye exam. Hypertension  blood pressures have been stable on Losartan 50 mg BID, Metoprolol  mg BID,  Amlodipine 5 mg/day and Cardizem  mg BID. 01/2024 creatinine 0.78. GFR 72. Electrolytes  normal except for Na+   08/2023 Hgb 12.0.  06/2023 creatinine 0.88. GFR 63.  Hyperlipidemia on Zetia 10 mg daily. 07/2023 Cholesterol 163.  Triglycerides 68.  HDL 53.  LDL 96.           08/2023 admission Diagnosis atrial fibrillation with rapid ventricular response.  2 prior ablations with pacemaker.  Chronic diastolic CHF, acute blood loss secondary to recent left TKR and hyponatremia. 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  CTA no acute PE.  Mild heterogeneous liver with slight lobulation along the left lobe.  Incidental thyroid nodule.  Venous Dopplers lower extremities no DVT.  Abdominal ultrasound hepatomegaly.  No evidence of cirrhosis or liver mass.  6 mm gallbladder polyp.  Troponin negative.  Elevated BNP at 651.  TSH 2.439.  Admission electrolytes normal for sodium 132.  LFTs normal except for total bilirubin 1.07.  Hemoglobin 10.0.  Patient was treated with IV Cardizem and started on oral Flecainide.  She ultimately underwent cardioversion.  Preprocedure SHANNAN Normal left ventricular systolic function.  EF 65%.  Wall motion normal.  Right ventricle normal.  Left atrium moderately dilated.  No thrombus.  No PFO.  Left atrial appendage dilated.  Normal function.  No thrombus.  Moderate TR.     06/2023 Echo  Left Ventricle: Left ventricular cavity size is normal. Wall thickness is normal. The left ventricular ejection fraction is 55%. Systolic function is normal. Wall motion is normal. Diastolic function is moderately abnormal, consistent with grade II (pseudonormal) relaxation.  ·  Right Ventricle: Right ventricular cavity size is normal. Systolic function is normal. A pacer wire is present.  ·  Left Atrium: The atrium is mildly dilated.  ·  Aortic Valve: There is mild regurgitation.  ·  Mitral Valve: There is moderate regurgitation.  ·  Tricuspid Valve: There is mild to moderate regurgitation. The right ventricular systolic pressure is moderately to severely elevated. The  estimated right ventricular systolic pressure is 64.00 mmHg.           Lab Results   Component Value Date    HGBA1C 7.6 (H) 01/09/2024       Lab Results   Component Value Date    WBC 5.31 01/09/2024    HGB 12.3 01/09/2024    HCT 37.5 01/09/2024    MCV 87 01/09/2024     01/09/2024     Lab Results   Component Value Date    SODIUM 128 (L) 01/09/2024    K 4.2 01/09/2024    CL 92 (L) 01/09/2024    CO2 26 01/09/2024    BUN 17 01/09/2024    CREATININE 0.74 01/09/2024    GLUC 124 01/06/2024    CALCIUM 9.3 01/09/2024     Procedure: XR chest 1 view portable    Result Date: 1/6/2024  Narrative: CHEST INDICATION:   Palpitations. History of atrial fibrillation. COMPARISON: Chest radiographs 1/4/2024 EXAM PERFORMED/VIEWS:  XR CHEST PORTABLE AP semierect Images:  1 FINDINGS: Patient is rotated. Monitoring leads and clips project over the chest. Pacemaker generator pack projects over the left hemithorax. Leads appear intact with distal tips projecting over the right atrium and right ventricle. Cardiomediastinal silhouette is within normal limits allowing for technique. Subsegmental linear atelectasis in the right mid to lower lung field. The visualized lungs are otherwise clear.  No pneumothorax or pleural effusion. Osseous structures appear within normal limits for patient age.     Impression: No acute cardiopulmonary disease. Resident: LJ MOURA I, the attending radiologist, have reviewed the images and agree with the final report above. Workstation performed: [id number]     Procedure: CTA ED chest PE study    Result Date: 1/5/2024  Narrative: CTA - CHEST WITH IV CONTRAST - PULMONARY ANGIOGRAM INDICATION:   palpitations, dyspnea with exersion, tachycardia. COMPARISON: 8/4/2023. TECHNIQUE: CTA examination of the chest was performed using angiographic technique according to a protocol specifically tailored to evaluate for pulmonary embolism.  Multiplanar 2D reformatted images were created from the source data. In  addition, coronal 3D MIP postprocessing was performed on the acquisition scanner. Radiation dose length product (DLP) for this visit:  381 mGy-cm .  This examination, like all CT scans performed in the [facility], was performed utilizing techniques to minimize radiation dose exposure, including the use of iterative reconstruction and automated exposure control. IV Contrast:  70 mL of iohexol (OMNIPAQUE) FINDINGS: PULMONARY ARTERIAL TREE:  No pulmonary embolus is seen. LUNGS: Lingular segment left upper lobe discoid atelectasis. Mild bibasilar dependent changes noted. Right greater than left bibasilar mild bronchiectasis suggesting sequelae of previous infectious or inflammatory process. PLEURA:  Unremarkable. HEART/GREAT VESSELS:  Unremarkable for patient's age. Left biventricular pacer noted. No thoracic aortic aneurysm. MEDIASTINUM AND BETTY:  Unremarkable. CHEST WALL AND LOWER NECK:   Stable right thyroid nodules, largest 2.1 cm. Thyroid recommendation as before. VISUALIZED STRUCTURES IN THE UPPER ABDOMEN:  Lobulated hepatic contour suspicious for cirrhosis. OSSEOUS STRUCTURES:  No acute fracture or destructive osseous lesion.     Impression: No PE. Incidental thyroid nodule(s) for which nonemergent thyroid ultrasound is recommended. The study was marked in EPIC for significant notification. Workstation performed: [id number]     Procedure: Cardiac EP device report    Result Date: 1/5/2024  Narrative: MDT-DUAL CHAMBER PPM (AAIR-DDDR MODE)/ ACTIVE SYSTEM IS MRI CONDITIONAL CARELINK TRANSMISSION DUE TO SYMPTOMS-ELEVATED HR: BATTERY VOLTAGE ADEQUATE (10.8 YRS). AP 91.1%  7.3% ALL AVAILABLE LEAD PARAMETERS WITHIN NORMAL LIMITS. NO SIGNIFICANT HIGH RATE EPISODES. PRESENTING RHYTHM ST  BPM. NORMAL DEVICE FUNCTION. AM     Procedure: XR chest pa & lateral    Result Date: 1/4/2024  Narrative: CHEST INDICATION:   Cough, unspecified. COMPARISON:  Comparison made with previous examination(s) dated (SR)  20-Dec-2023,(DX) 04-Aug-2023,(CT) 04-Aug-2023,(NM) 05-Oct-2022,(CR) 13-Jun-2022. EXAM PERFORMED/VIEWS:  XR CHEST PA & LATERAL FINDINGS: Cardiomediastinal silhouette appears enlarged. Dual-chamber pacemaker in place.. The lungs are clear.  No pneumothorax or pleural effusion. Osseous structures appear within normal limits for patient age.     Impression: No acute cardiopulmonary disease. Cardiomegaly with dual-chamber pacemaker noted. No significant change from prior study of 8//2023. Electronically signed: 01/04/2024 05:45 PM Iftikhar Lebron MD    Procedure: US elastography/UGAP    Result Date: 12/27/2023  Narrative: ELASTOGRAPHY, LIVER ULTRASOUND INDICATION:   R16.0: Hepatomegaly, not elsewhere classified. COMPARISON: 8/5/2023 TECHNIQUE: Targeted ultrasound of the liver was performed with a curvilinear transducer on a [employer] e10 utilizing 2D SWE.  A total of 10 shear wave Elastography samples were obtained. FINDINGS: Shear Wave Elastography sampling was performed to evaluate stiffness changes within the liver associated with fibrosis. E1  6.94 kPa E2  5.53 kPa E3  5.92 kPa E4  6.93 kPa E5  6.13 kPa E6  7.69 kPa E7  7.07 kPa E8  6.54 kPa E9  5.73 kPa E10 6.57 kPa E median:  6.56 kPa. The corresponding Metavir score is F0-F1(absent or mild fibrosis), range 0-8.26kPa. <1.66 m/s. IQR/median:  14.7 %.  (IQR of less than 30% is considered a satisfactory data set). Note: that the stage of liver fibrosis may be overestimated by clinical conditions unrelated to fibrosis, including but not limited to, nonfasting, hepatic inflammation, vascular congestion, biliary obstruction, and infiltrative liver disease. Furthermore, in some patients with NAFLD, the cut-off values for compensated advanced chronic liver disease may be lower (7-9 kPa). In causes other than viral hepatitis and nonalcoholic fatty liver disease, the cut-off values are not well established. When comparing measurements across time, a clinically significant change  should be considered when the delta change is greater than 10%. In all these conditions, however, liver stiffness values within the normal range exclude significant liver fibrosis. Ultrasound-guided attenuation parameter (UGAP) Liver Steatosis Grading A1  0.64 dB/cm/MHz A2  0.68 dB/cm/MHz A3  0.67 dB/cm/MHz A4  0.65 dB/cm/MHz A5  0.62 dB/cm/MHz A6  0.62 dB/cm/MHz A7  0.65 dB/cm/MHz A8  0.69 dB/cm/MHz A9  0.67 dB/cm/MHz A10 0.63 dB/cm/MHz Attenuation coefficient:  0.65 dB/cm/MHz Liver steatosis grading:  S1 ( 5% - 33% steatosis) range 0.65-0.70 dB/cm/MHz IQR/Med:  5.5 % (Less than or equal to 30% is a satisfactory data set.) Reference White paper for [employer] ultrasound-guided attenuation parameter [email or web address]?la=en-au 8 mm sessile gallbladder polyp again seen.     Impression: Metavir score: F0 - F1, Absent or Mild Fibrosis According to the updated SRU consensus statement, a liver stiffness of 6.56 kPa, which in the absence of other known clinical signs*, rules out compensated advanced chronic liver disease (cACLD). If there are known clinical signs, may need further test for confirmation. https://pubs.rsna.org/doi/10.1148/radiol.3001703745 Liver steatosis grading:  S1 ( 5% - 33% steatosis) 8 mm gallbladder polyp again seen It is sessile in appearance. According to current consensus recommendations (SRU 2022; 000:1-12), for polyps of this size (7-9 mm) which have a low risk morphology (pedunculated with thick/wide stalk, or sessile), follow-up ultrasound is recommended at 12 months to ensure stability. Reference: Management of Incidentally Detected Gallbladder Polyps: Society of Radiologists in Ultrasound Consensus Conference Recommendations. Radiology 2022; 000:1-12. https://pubs.rsna.org/doi/full/10.1148/radiol.503377 The study was marked in EPIC for significant notification. Workstation performed: [id number]     Procedure: US thyroid    Result Date:  12/27/2023  Narrative: THYROID ULTRASOUND INDICATION:    E04.1: Nontoxic single thyroid nodule. COMPARISON: Thyroid ultrasound 9/25/2020 TECHNIQUE:   Ultrasound of the thyroid was performed with a high frequency linear transducer in transverse and sagittal planes including volumetric imaging sweeps as well as traditional still imaging technique. FINDINGS:  Thyroid parenchyma is diffusely heterogeneous in echotexture. Right lobe: 5.3 x 3.0 x 2.4 cm. Volume 18.5 mL Left lobe: Surgically absent. Isthmus: 0.4  cm. Nodule #1.  Image 21. RIGHT midgland anterior nodule measuring 1.8 x 1.4 x 1.3 cm. Not well delineated on the prior study. COMPOSITION:  2 points, solid or almost completely solid . ECHOGENICITY:  1 point, hyperechoic or isoechoic. SHAPE:  0 points, wider-than-tall. MARGIN: 0 points, ill-defined. ECHOGENIC FOCI:  0 points, none or large comet-tail artifacts. TI-RADS Classification: TR 3 (3 points), FNA if >2.5 cm.  Follow if >1.5 cm. Nodule #2.  Image 26. RIGHT midgland posterior nodule measuring 0.9 x 0.8 x 1.0 cm.  Given differences in measuring technique, no significant change from prior. COMPOSITION:  2 points, solid or almost completely solid . ECHOGENICITY:  2 points, hypoechoic. SHAPE:  0 points, wider-than-tall. MARGIN: 0 points, ill-defined. ECHOGENIC FOCI:  0 points, none or large comet-tail artifacts. TI-RADS Classification: TR 4 (4-6 points), FNA if > 1.5 cm. Follow if > 1cm. Nodule #3.  Image 29. RIGHT lower pole nodule measuring 2.5 x 1.7 x 2.5 cm. COMPOSITION:  1 point, mixed cystic and solid. ECHOGENICITY:  1 point, hyperechoic or isoechoic. SHAPE:  0 points, wider-than-tall. MARGIN: 0 points, ill-defined. ECHOGENIC FOCI:  0 points, none or large comet-tail artifacts. TI-RADS Classification: TR 2 (2 points), Not suspious. No FNA. There are additional nodules of lesser size and/or TI-RADS score.  These do not necessitate additional evaluation based on ACR criteria.     Impression: No nodule  "meets current ACR criteria for requiring biopsy but followup ultrasound is recommended in 1 year. Reference: ACR Thyroid Imaging, Reporting and Data System (TI-RADS): White Paper of the ACR TI-RADS Committee. J AM Maribel Radiol 2017;14:587-595. (additional recommendations based on American Thyroid Association 2015 guidelines.) This examination demonstrates a finding requiring imaging follow-up and was logged as such in Epic. Workstation performed: [id number]     Procedure: Cardiac EP device report    Result Date: 12/21/2023  Narrative: MDT-DUAL CHAMBER PPM (AAIR-DDDR MODE)/ ACTIVE SYSTEM IS MRI CONDITIONAL CARELINK TRANSMISSION: BATTERY STATUS \"11 YRS.\" AP 96%  0%. ALL AVAILABLE LEAD PARAMETERS WITHIN NORMAL LIMITS. NO SIGNIFICANT HIGH RATE EPISODES. NORMAL DEVICE FUNCTION. NC         Review of Systems   Constitutional:  Negative for appetite change, chills, fatigue, fever and unexpected weight change.   HENT:  Positive for hearing loss (bilateral hearing aids ). Negative for congestion, ear pain, rhinorrhea, sore throat and trouble swallowing.    Eyes:  Negative for visual disturbance.   Respiratory:  Positive for cough. Negative for shortness of breath and wheezing.         Persistent Chronic cough non productive. No nasal congestion or postnasal drainage.  On daily Zyrtec.  No reflux symptoms on daily PPI.  No history of asthma.  No prior allergy testing. 01/2024 CTA no PE. Lingular segment left upper lobe discoid atelectasis. Mild bibasilar dependent changes noted. Right greater than left bibasilar mild bronchiectasis    Cardiovascular:  Negative for chest pain, palpitations and leg swelling.        See HPI.  Repeat echocardiogram 12/2023 left ventricular cavity size normal.  Left ventricular ejection fraction 55%.  Left ventricular systolic function normal.  Wall motion normal.  Diastolic function abnormal.  Dilated left atrium.  Moderate MR.  Moderate TR.    06/2022 admission for CHF, hyponatremia with altered " mental status.  Chest x-ray shows small bilateral effusions. .  Sodium prior to admission 125.  On repeat 128 in the setting of volume overload.  Patient was treated with IV diuresis with symptomatic improvement.  Discharge sodium 136.   Mental status improved back to baseline.  Prior  cardio versions.     03/2021 admission for atrial fibrillation status post ablation procedure. Pre procedure episode of bradycardia with QT prolongation and torsades successfully defibrillated.  Subsequent pacemaker placed. Repeat ablation 08/2022 at [place].        Gastrointestinal:  Negative for abdominal pain, blood in stool, constipation, diarrhea, nausea and vomiting.        See HPI.  12/2023 u/s elastography E median:  6.56 kPa. Corresponding Metavir score is F0-F1(absent or mild fibrosis), range 0-8.26kPa. <1.66 m/s. IQR/median:  14.7 %  Colonoscopy 04/2018 Two benign polyps.  Mild diverticulosis left-sided colon.   Endocrine: Negative for polydipsia and polyuria.        See HPI.  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.  Repeat thyroid u/s 12/2023  3 additional nodules of lesser size and/or TI-RADS score.  These do not necessitate additional evaluation based on ACR criteria.      09/2018 DEXA scan T-score -1.5 left femoral neck. On vitamin D supplement    Lab Results       Component                Value               Date                       [id number]             2.439               08/04/2023                                                          Genitourinary:  Negative for difficulty urinating.   Musculoskeletal:  Negative for arthralgias and myalgias.        See HPI. OA shoulders.  07/2019 x-rays right shoulder mild degenerative arthritis right AC joint.  Left shoulder mild degenerative changes left AC joint.  08/2019 bilateral shoulder intra-articular steroid injections  via fluoroscopy with symptomatic relief. S/p bilateral TKR.    Skin:  Negative for rash.   Neurological:  Positive for tremors. Negative for dizziness and headaches.        CNS meningioma. 10/2023 MRI brain Stable meningioma within the left anterior lateral anterior cranial fossa.  Stable advanced chronic microangiopathic change   04/2022 MRI brain 4.2 x 1.7 x 3.0 cm fairly homogeneously enhancing, extra-axial mass within the inferior lateral aspect of the left anterior cranial fossa.  Mild adjacent dural thickening and enhancement is seen extending inferiorly into the middle cranial fossa and superiorly into the dura of the anterior frontal vertex.  Findings are most suggestive of meningioma. Neurosurgical evaluation at [facility] 05/2022. Second opinion at [facility]. RLS on Requip. 04/2021 neurology evaluation for tremor suspected benign essential  tremor.  She is on a Beta-blocker and Gabapentin.  Family history + essential tremor brother.    Hematological:  Negative for adenopathy. Does not bruise/bleed easily.        Monoclonal gammopathy. 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. 1/2023 IgG level normal at 905        Lab Results       Component                Value               Date                       WBC                      6.27                08/28/2023                 HGB                      12.0                08/28/2023                 HCT                      38.3                08/28/2023                 MCV                      97                  08/28/2023                 PLT                      336                 08/28/2023                Lab Results       Component                Value               Date                       [id number]               799                 07/01/2022                          MMA elevated at 489   Psychiatric/Behavioral:  Negative for dysphoric mood and sleep disturbance. The patient is nervous/anxious.         Stable on low dose Duloxetine 30 mg/day.  Chronic insomnia on prn  Zolpidem 10 mg                 Past Medical History:   Diagnosis Date    Actinic keratosis     last assessed - 06Jun2014    Arthritis     Atrial fibrillation with rapid ventricular response (HCC)     last assessed - 26Apr2016    Basal cell carcinoma     Benign colon polyp     last assessed - 27Apr2015    CHF (congestive heart failure) (HCC) 06/2022    Disease of thyroid gland     Effusion of knee joint right     Resolved - 19Apr2016    Esophageal reflux     Fibromyalgia     last assessed - [id number]    Fibromyalgia, primary     GERD (gastroesophageal reflux disease)     Hypertension     Palpitations     last assessed - 30Apr2013    Peroneal tendonitis, right     last assessed - 02Oct2013    Right lumbar radiculopathy 03/17/2016    Thyroid cancer (HCC)     Thyroid nodule     Trochanteric bursitis of left hip 03/09/2018     Past Surgical History:   Procedure Laterality Date    CARDIAC ELECTROPHYSIOLOGY STUDY AND ABLATION  08/2022    CATARACT EXTRACTION Bilateral     COLONOSCOPY  03/2018    COLONOSCOPY W/ POLYPECTOMY  2021    repeat in 5 years    EYE SURGERY      HYSTERECTOMY      JOINT REPLACEMENT Left     knee    TN NEUROPLASTY &/TRANSPOS MEDIAN NRV CARPAL TUNNE Right 11/14/2019    Procedure: RELEASE CARPAL TUNNEL;  Surgeon: Onesimo Tate MD;  Location: [facility];  Service: Orthopedics    TN TOTAL THYROID LOBECTOMY UNI W/WO ISTHMUSECTOMY Left 12/16/2020    Procedure: Left THYROID LOBECTOMY;  Surgeon: Jhony Bhatt MD;  Location: [facility];  Service: ENT    RECTAL POLYPECTOMY      REPLACEMENT TOTAL KNEE Left     last assessed - 27Apr2015    TONSILLECTOMY      TOTAL ABDOMINAL HYSTERECTOMY      TUBAL LIGATION      US GUIDED THYROID BIOPSY  10/14/2020     Family History   Problem Relation Age of Onset    Heart disease Mother     Diabetes Mother     Heart disease Father     Coronary artery disease Father     Stroke Father         cerebrovascular accident    Heart attack Father         myocardial infarction    Sudden  death Father         scd    Other Family         Back disorder    Coronary artery disease Family     Neuropathy Family     Osteoporosis Family     No Known Problems Daughter     No Known Problems Maternal Grandmother     No Known Problems Maternal Grandfather     No Known Problems Paternal Grandmother     No Known Problems Paternal Grandfather     Cancer Maternal Uncle     Breast cancer Maternal Aunt 65    No Known Problems Son     No Known Problems Maternal Aunt     No Known Problems Maternal Aunt     No Known Problems Maternal Aunt     No Known Problems Paternal Aunt     No Known Problems Paternal Aunt     Anuerysm Neg Hx     Clotting disorder Neg Hx     Arrhythmia Neg Hx     Heart failure Neg Hx      Social History     Socioeconomic History    Marital status:      Spouse name: None    Number of children: None    Years of education: None    Highest education level: None   Occupational History    None   Tobacco Use    Smoking status: Never    Smokeless tobacco: Never   Vaping Use    Vaping status: Never Used   Substance and Sexual Activity    Alcohol use: Not Currently    Drug use: Never    Sexual activity: Not Currently   Other Topics Concern    None   Social History Narrative    None     Social Determinants of Health     Financial Resource Strain: Patient Unable To Answer (12/19/2023)    Overall Financial Resource Strain (CARDIA)     Difficulty of Paying Living Expenses: Patient unable to answer   Food Insecurity: No Food Insecurity (1/5/2024)    Hunger Vital Sign     Worried About Running Out of Food in the Last Year: Never true     Ran Out of Food in the Last Year: Never true   Transportation Needs: No Transportation Needs (1/5/2024)    PRAPARE - Transportation     Lack of Transportation (Medical): No     Lack of Transportation (Non-Medical): No   Physical Activity: Not on file   Stress: Not on file   Social Connections: Not on file   Intimate Partner Violence: Not At Risk (7/31/2023)    Received from  [facility]    Humiliation, Afraid, Rape, and Kick questionnaire     Fear of Current or Ex-Partner: No     Emotionally Abused: No     Physically Abused: No     Sexually Abused: No   Housing Stability: Low Risk  (1/5/2024)    Housing Stability Vital Sign     Unable to Pay for Housing in the Last Year: No     Number of Places Lived in the Last Year: 1     Unstable Housing in the Last Year: No     Current Outpatient Medications on File Prior to Visit   Medication Sig    apixaban (ELIQUIS) 5 mg Take 1 tablet (5 mg total) by mouth 2 (two) times a day    ascorbic acid (VITAMIN C) 500 mg tablet Take 500 mg by mouth daily     Cetirizine HCl (ZyrTEC Allergy) 10 MG CAPS Take 10 mg by mouth in the morning    Cholecalciferol 50 MCG (2000 UT) CAPS Take 2,000 Units by mouth 2 (two) times a day    Chromium Picolinate (CHROMIUM PICOLATE PO) Take 1 tablet by mouth daily    diltiazem (CARDIZEM CD) 120 mg 24 hr capsule Take 1 capsule (120 mg total) by mouth 2 (two) times a day You may take 1 additional capsule daily (120 mg) if you have palpitations and elevated heart rate consistent with your atrial flutter/fibrillation. Max 360 mg daily.    DULoxetine (CYMBALTA) 30 mg delayed release capsule take 1 capsule by mouth once daily    ezetimibe (ZETIA) 10 mg tablet take 1 tablet by mouth once daily    flecainide (TAMBOCOR) 100 mg tablet Take 1 tablet (100 mg total) by mouth 2 (two) times a day    furosemide (LASIX) 20 mg tablet Take 1 tablet (20 mg total) by mouth daily take 2 tablet by mouth daily if needed for shortness of breath WEIGHT GAIN 3 LBS IN 1 DAY OR LOWER LEG SWELLING    gabapentin (NEURONTIN) 300 mg capsule Take 1 capsule (300 mg total) by mouth daily at bedtime    losartan (COZAAR) 50 mg tablet Take 1 tablet (50 mg total) by mouth 2 (two) times a day    metoprolol succinate (TOPROL-XL) 100 mg 24 hr tablet Take 1 tablet (100 mg total) by mouth every 12 (twelve) hours    rOPINIRole (REQUIP) 1 mg tablet Take 2  "tablets (2 mg total) by mouth daily at bedtime    Saccharomyces boulardii (Probiotic) 250 MG CAPS Take 1 capsule by mouth daily    TURMERIC PO Take 1 capsule by mouth 2 (two) times a day    zolpidem (AMBIEN) 10 mg tablet Take 1 tablet (10 mg total) by mouth daily at bedtime as needed for sleep    ipratropium (ATROVENT) 0.03 % nasal spray 2 sprays into each nostril 3 (three) times a day Begin once per day, then progress to twice per day. Progress to three times a day as tolerated. (Patient not taking: Reported on 1/11/2024)     Allergies   Allergen Reactions    Sotalol Other (See Comments)     Prolonged QTc leading to torsades de pointes     Penicillins Other (See Comments)     As a child calcium deposit in the arm     Ace Inhibitors GI Intolerance     Did feel well on it    Omeprazole Abdominal Pain, Rash and Vomiting     stomach pain, vomiting, rash     Immunization History   Administered Date(s) Administered    COVID-19 MODERNA VACC 0.5 ML IM 01/27/2021, 02/24/2021    INFLUENZA 12/23/2015, 11/07/2016, 10/18/2017, 12/04/2018, 09/19/2022    Influenza Split High Dose Preservative Free IM 10/01/2014, 12/23/2015, 11/07/2016, 10/18/2017    Influenza, high dose seasonal 0.7 mL 12/04/2018, 09/26/2019, 09/08/2020, 11/03/2021, 09/19/2022, 12/19/2023    Influenza, seasonal, injectable 10/16/2012    Pneumococcal Conjugate 13-Valent 04/27/2015    Pneumococcal Polysaccharide PPV23 03/29/2022       Objective     /76 (BP Location: Left arm, Patient Position: Sitting, Cuff Size: Standard)   Temp (!) 97.1 °F (36.2 °C)   Resp 18   Ht 5' 2\" (1.575 m)   Wt 72.1 kg (159 lb)   LMP 02/01/1990 (Within Weeks)   BMI 29.08 kg/m²     Physical Exam  Naveen Monsivais MD    "

## 2024-01-12 NOTE — PATIENT INSTRUCTIONS
Type 2 Diabetes Management for Adults   AMBULATORY CARE:   Type 2 diabetes  is a disease that affects how your body uses glucose (sugar). Either your body cannot make enough insulin, or it cannot use the insulin correctly. It is important to keep diabetes controlled to prevent damage to your heart, blood vessels, and other organs. Management will help you feel well and enjoy your daily activities. Your diabetes care team providers can help you make a plan to fit diabetes care into your schedule. Your plan can change over time to fit your needs and your family's needs.       Have someone call your local emergency number (911 in the [place]) if:   You cannot be woken.    You have signs of diabetic ketoacidosis:     confusion, fatigue    vomiting    rapid heartbeat    fruity smelling breath    extreme thirst    dry mouth and skin    You have any of the following signs of a heart attack:      Squeezing, pressure, or pain in your chest    You may  also have any of the following:     Discomfort or pain in your back, neck, jaw, stomach, or arm    Shortness of breath    Nausea or vomiting    Lightheadedness or a sudden cold sweat    You have any of the following signs of a stroke:      Numbness or drooping on one side of your face     Weakness in an arm or leg    Confusion or difficulty speaking    Dizziness, a severe headache, or vision loss    Call your doctor or diabetes care team provider if:   You have a sore or wound that will not heal.    You have a change in the amount you urinate.    Your blood sugar levels are higher than your target goals.    You often have lower blood sugar levels than your target goals.    Your skin is red, dry, warm, or swollen.    You have trouble coping with diabetes, or you feel anxious or depressed.    You have trouble following any part of your care plan, such as your meal plan.    You have questions or concerns about your condition or care.    What you need to know about high blood sugar  levels:  High blood sugar levels may not cause any symptoms. You may feel more thirsty or urinate more often than usual. Over time, high blood sugar levels can damage your nerves, blood vessels, tissues, and organs. The following can increase your blood sugar levels:  Large meals or large amounts of carbohydrates at one time    Less physical activity    Stress    Illness    A lower dose of diabetes medicine or insulin, or a late dose    What you need to know about low blood sugar levels:  Symptoms include feeling shaky, dizzy, irritable, or confused. You can prevent symptoms by keeping your blood sugar levels from going too low.  Treat a low blood sugar level right away:      Drink 4 ounces of juice or have 1 tube of glucose gel.    Check your blood sugar level again 10 to 15 minutes later.    When the level goes back to normal, eat a meal or snack to prevent another decrease.       Keep glucose gel, raisins, or hard candy with you at all times to treat a low blood sugar level.     Your blood sugar level can get too low if you take diabetes medicine or insulin and do not eat enough food.     If you use insulin, check your blood sugar level before you exercise.      If your blood sugar level is below 100 mg/dL, eat 4 crackers or 2 ounces of raisins, or drink 4 ounces of juice.    Check your level every 30 minutes if you exercise longer than 1 hour.    You may need a snack during or after exercise.    What you can do to manage your blood sugar levels:   Check your blood sugar levels as directed and as needed.  Several items are available to use to check your levels. You may need to check by testing a drop of blood in a glucose monitor. You may instead be given a continuous glucose monitoring (CGM) device. The device is worn at all times. The CGM checks your blood sugar level every 5 minutes. It sends results to an electronic device such as a smart phone. A CGM can be used with or without an insulin pump. You and your  diabetes care team providers will decide on the best method for you. The goal for blood sugar levels before meals  is between 80 and 130 mg/dL and 2 hours after eating  is lower than 180 mg/dL.            Make healthy food choices.  Work with a dietitian to create a meal plan that works for you and your schedule. A dietitian can help you learn how to eat the right amount of carbohydrates (sugar and starchy foods) during your meals and snacks. Examples of carbohydrates are breads, cereals, rice, pasta, fruit, low-fat dairy, and sweets. Carbohydrates can raise your blood sugar level if you eat too many at one time.         Eat high-fiber foods as directed.  Fiber helps improve blood sugar levels. Fiber also lowers your risk for heart disease and other problems diabetes can cause. Examples of high-fiber foods include vegetables, whole-grain bread, and beans such as campbell beans. Your dietitian can tell you how much fiber to have each day.         Get regular physical activity.  Physical activity can help you get to your target blood sugar level goal and manage your weight. Get at least 150 minutes of moderate to vigorous aerobic physical activity each week. Resistance training, such as lifting weights, should be done 3 times each week. Do not miss more than 2 days of physical activity in a row. Do not sit longer than 30 minutes at a time. Your healthcare provider can help you create an activity plan. The plan can include the best activities for you and can help you build your strength and endurance.            Maintain a healthy weight.  Ask your team what a healthy weight is for you. A healthy weight can help you control diabetes and prevent heart disease. Ask your team to help you create a weight-loss plan, if needed. Even a loss of 3% to 7% of your excess body weight can help make a difference in managing diabetes. Your team will help you set a weight-loss goal, such as 10 to 15 pounds, or 5% of your extra weight.  Together you and your team can set manageable weight-loss goals.    Take your diabetes medicine or insulin as directed.  You may need diabetes medicine, insulin, or both to help control your blood sugar levels. Your healthcare provider will teach you how and when to take your diabetes medicine or insulin. You will also be taught about side effects oral diabetes medicine can cause. Insulin may be injected or given through a pump or pen. You and your providers will decide on the best method for you:    An insulin pump  is an implanted device that gives your insulin 24 hours a day. An insulin pump prevents the need for multiple insulin injections in a day.         An insulin pen  is a device prefilled with the right amount of insulin.         You and your family members will be taught how to draw up and give insulin  if this is the best method for you. Your providers will also teach you how to dispose of needles and syringes.    You will learn how much insulin you need  and when to give it. You will be taught when not to give insulin. You will also be taught what to do if your blood sugar level drops too low. This may happen if you take insulin and do not eat the right amount of carbohydrates.    More ways to manage type 2 diabetes:   Wear medical alert identification.  Wear medical alert jewelry or carry a card that says you have diabetes. Ask your provider where to get these items.         Do not smoke.  Nicotine and other chemicals in cigarettes and cigars can cause lung and blood vessel damage. It also makes it more difficult to manage your diabetes. Ask your provider for information if you currently smoke and need help to quit. Do not use e-cigarettes or smokeless tobacco in place of cigarettes or to help you quit. They still contain nicotine.    Check your feet each day for cuts, scratches, calluses, or other wounds.  Look for redness and swelling, and feel for warmth. Wear shoes that fit well. Check your shoes  for rocks or other objects that can hurt your feet. Do not walk barefoot or wear shoes without socks. Wear cotton socks to help keep your feet dry.         Ask about vaccines you may need.  You have a higher risk for serious illness if you get the flu, pneumonia, COVID-19, or hepatitis. Ask your provider if you should get vaccines to prevent these or other diseases, and when to get the vaccines.    Talk to your provider if you become stressed about diabetes care.  Sometimes being able to fit diabetes care into your life can cause increased stress. The stress can cause you not to take care of yourself properly. Your provider can help by offering tips about self-care. A mental health provider can listen and offer help with self-care issues. Other types of counseling can help you make nutrition or physical activity changes.    Have your A1c checked as directed.  Your provider may check your A1c every 3 months, or 2 times each year if your diabetes is controlled. An A1c test shows the average amount of sugar in your blood over the past 2 to 3 months. Your provider will tell you what your A1c level should be.    Have screening tests as directed.  Your provider may recommend screening for complications of diabetes and other conditions that may develop. Some screenings may begin right away and some may happen within the first 5 years of diagnosis:    Examples of diabetes complications  include kidney problems, high cholesterol, high blood pressure, blood vessel problems, eye problems, and sleep apnea.    You may be screened for a low vitamin B level  if you take oral diabetes medicine for a long time.    You may be screened for polycystic ovarian syndrome (PCOS)  if you are of childbearing age.    Follow up with your doctor or diabetes care team providers as directed:  You may need to have blood tests done before your follow-up visit. The test results will show if changes need to be made in your treatment or self-care.  Talk to your provider if you cannot afford your medicine. Write down your questions so you remember to ask them during your visits.  © Copyright [employer] 2023 Information is for End User's use only and may not be sold, redistributed or otherwise used for commercial purposes.  The above information is an  only. It is not intended as medical advice for individual conditions or treatments. Talk to your doctor, nurse or pharmacist before following any medical regimen to see if it is safe and effective for you.    Type 2 Diabetes Management for Adults   AMBULATORY CARE:   Type 2 diabetes  is a disease that affects how your body uses glucose (sugar). Either your body cannot make enough insulin, or it cannot use the insulin correctly. It is important to keep diabetes controlled to prevent damage to your heart, blood vessels, and other organs. Management will help you feel well and enjoy your daily activities. Your diabetes care team providers can help you make a plan to fit diabetes care into your schedule. Your plan can change over time to fit your needs and your family's needs.       Have someone call your local emergency number (911 in the [place]) if:   You cannot be woken.    You have signs of diabetic ketoacidosis:     confusion, fatigue    vomiting    rapid heartbeat    fruity smelling breath    extreme thirst    dry mouth and skin    You have any of the following signs of a heart attack:      Squeezing, pressure, or pain in your chest    You may  also have any of the following:     Discomfort or pain in your back, neck, jaw, stomach, or arm    Shortness of breath    Nausea or vomiting    Lightheadedness or a sudden cold sweat    You have any of the following signs of a stroke:      Numbness or drooping on one side of your face     Weakness in an arm or leg    Confusion or difficulty speaking    Dizziness, a severe headache, or vision loss    Call your doctor or diabetes care team provider if:   You have a  sore or wound that will not heal.    You have a change in the amount you urinate.    Your blood sugar levels are higher than your target goals.    You often have lower blood sugar levels than your target goals.    Your skin is red, dry, warm, or swollen.    You have trouble coping with diabetes, or you feel anxious or depressed.    You have trouble following any part of your care plan, such as your meal plan.    You have questions or concerns about your condition or care.    What you need to know about high blood sugar levels:  High blood sugar levels may not cause any symptoms. You may feel more thirsty or urinate more often than usual. Over time, high blood sugar levels can damage your nerves, blood vessels, tissues, and organs. The following can increase your blood sugar levels:  Large meals or large amounts of carbohydrates at one time    Less physical activity    Stress    Illness    A lower dose of diabetes medicine or insulin, or a late dose    What you need to know about low blood sugar levels:  Symptoms include feeling shaky, dizzy, irritable, or confused. You can prevent symptoms by keeping your blood sugar levels from going too low.  Treat a low blood sugar level right away:      Drink 4 ounces of juice or have 1 tube of glucose gel.    Check your blood sugar level again 10 to 15 minutes later.    When the level goes back to normal, eat a meal or snack to prevent another decrease.       Keep glucose gel, raisins, or hard candy with you at all times to treat a low blood sugar level.     Your blood sugar level can get too low if you take diabetes medicine or insulin and do not eat enough food.     If you use insulin, check your blood sugar level before you exercise.      If your blood sugar level is below 100 mg/dL, eat 4 crackers or 2 ounces of raisins, or drink 4 ounces of juice.    Check your level every 30 minutes if you exercise longer than 1 hour.    You may need a snack during or after  exercise.    What you can do to manage your blood sugar levels:   Check your blood sugar levels as directed and as needed.  Several items are available to use to check your levels. You may need to check by testing a drop of blood in a glucose monitor. You may instead be given a continuous glucose monitoring (CGM) device. The device is worn at all times. The CGM checks your blood sugar level every 5 minutes. It sends results to an electronic device such as a smart phone. A CGM can be used with or without an insulin pump. You and your diabetes care team providers will decide on the best method for you. The goal for blood sugar levels before meals  is between 80 and 130 mg/dL and 2 hours after eating  is lower than 180 mg/dL.            Make healthy food choices.  Work with a dietitian to create a meal plan that works for you and your schedule. A dietitian can help you learn how to eat the right amount of carbohydrates (sugar and starchy foods) during your meals and snacks. Examples of carbohydrates are breads, cereals, rice, pasta, fruit, low-fat dairy, and sweets. Carbohydrates can raise your blood sugar level if you eat too many at one time.         Eat high-fiber foods as directed.  Fiber helps improve blood sugar levels. Fiber also lowers your risk for heart disease and other problems diabetes can cause. Examples of high-fiber foods include vegetables, whole-grain bread, and beans such as campbell beans. Your dietitian can tell you how much fiber to have each day.         Get regular physical activity.  Physical activity can help you get to your target blood sugar level goal and manage your weight. Get at least 150 minutes of moderate to vigorous aerobic physical activity each week. Resistance training, such as lifting weights, should be done 3 times each week. Do not miss more than 2 days of physical activity in a row. Do not sit longer than 30 minutes at a time. Your healthcare provider can help you create an  activity plan. The plan can include the best activities for you and can help you build your strength and endurance.            Maintain a healthy weight.  Ask your team what a healthy weight is for you. A healthy weight can help you control diabetes and prevent heart disease. Ask your team to help you create a weight-loss plan, if needed. Even a loss of 3% to 7% of your excess body weight can help make a difference in managing diabetes. Your team will help you set a weight-loss goal, such as 10 to 15 pounds, or 5% of your extra weight. Together you and your team can set manageable weight-loss goals.    Take your diabetes medicine or insulin as directed.  You may need diabetes medicine, insulin, or both to help control your blood sugar levels. Your healthcare provider will teach you how and when to take your diabetes medicine or insulin. You will also be taught about side effects oral diabetes medicine can cause. Insulin may be injected or given through a pump or pen. You and your providers will decide on the best method for you:    An insulin pump  is an implanted device that gives your insulin 24 hours a day. An insulin pump prevents the need for multiple insulin injections in a day.         An insulin pen  is a device prefilled with the right amount of insulin.         You and your family members will be taught how to draw up and give insulin  if this is the best method for you. Your providers will also teach you how to dispose of needles and syringes.    You will learn how much insulin you need  and when to give it. You will be taught when not to give insulin. You will also be taught what to do if your blood sugar level drops too low. This may happen if you take insulin and do not eat the right amount of carbohydrates.    More ways to manage type 2 diabetes:   Wear medical alert identification.  Wear medical alert jewelry or carry a card that says you have diabetes. Ask your provider where to get these items.          Do not smoke.  Nicotine and other chemicals in cigarettes and cigars can cause lung and blood vessel damage. It also makes it more difficult to manage your diabetes. Ask your provider for information if you currently smoke and need help to quit. Do not use e-cigarettes or smokeless tobacco in place of cigarettes or to help you quit. They still contain nicotine.    Check your feet each day for cuts, scratches, calluses, or other wounds.  Look for redness and swelling, and feel for warmth. Wear shoes that fit well. Check your shoes for rocks or other objects that can hurt your feet. Do not walk barefoot or wear shoes without socks. Wear cotton socks to help keep your feet dry.         Ask about vaccines you may need.  You have a higher risk for serious illness if you get the flu, pneumonia, COVID-19, or hepatitis. Ask your provider if you should get vaccines to prevent these or other diseases, and when to get the vaccines.    Talk to your provider if you become stressed about diabetes care.  Sometimes being able to fit diabetes care into your life can cause increased stress. The stress can cause you not to take care of yourself properly. Your provider can help by offering tips about self-care. A mental health provider can listen and offer help with self-care issues. Other types of counseling can help you make nutrition or physical activity changes.    Have your A1c checked as directed.  Your provider may check your A1c every 3 months, or 2 times each year if your diabetes is controlled. An A1c test shows the average amount of sugar in your blood over the past 2 to 3 months. Your provider will tell you what your A1c level should be.    Have screening tests as directed.  Your provider may recommend screening for complications of diabetes and other conditions that may develop. Some screenings may begin right away and some may happen within the first 5 years of diagnosis:    Examples of diabetes complications   include kidney problems, high cholesterol, high blood pressure, blood vessel problems, eye problems, and sleep apnea.    You may be screened for a low vitamin B level  if you take oral diabetes medicine for a long time.    You may be screened for polycystic ovarian syndrome (PCOS)  if you are of childbearing age.    Follow up with your doctor or diabetes care team providers as directed:  You may need to have blood tests done before your follow-up visit. The test results will show if changes need to be made in your treatment or self-care. Talk to your provider if you cannot afford your medicine. Write down your questions so you remember to ask them during your visits.  © Copyright [employer] 2023 Information is for End User's use only and may not be sold, redistributed or otherwise used for commercial purposes.  The above information is an  only. It is not intended as medical advice for individual conditions or treatments. Talk to your doctor, nurse or pharmacist before following any medical regimen to see if it is safe and effective for you.

## 2024-01-13 DIAGNOSIS — I48.0 PAROXYSMAL ATRIAL FIBRILLATION (HCC): ICD-10-CM

## 2024-01-13 DIAGNOSIS — I10 ESSENTIAL HYPERTENSION: ICD-10-CM

## 2024-01-13 PROBLEM — N18.32 STAGE 3B CHRONIC KIDNEY DISEASE (HCC): Status: RESOLVED | Noted: 2022-04-18 | Resolved: 2024-01-13

## 2024-01-13 PROBLEM — E04.2 MULTIPLE THYROID NODULES: Status: ACTIVE | Noted: 2024-01-13

## 2024-01-15 ENCOUNTER — OFFICE VISIT (OUTPATIENT)
Dept: GASTROENTEROLOGY | Facility: CLINIC | Age: 79
End: 2024-01-15
Payer: MEDICARE

## 2024-01-15 VITALS
HEIGHT: 62 IN | TEMPERATURE: 96.4 F | BODY MASS INDEX: 29.04 KG/M2 | WEIGHT: 157.8 LBS | SYSTOLIC BLOOD PRESSURE: 148 MMHG | DIASTOLIC BLOOD PRESSURE: 90 MMHG | HEART RATE: 62 BPM

## 2024-01-15 DIAGNOSIS — K76.9 LIVER DISEASE, UNSPECIFIED: ICD-10-CM

## 2024-01-15 DIAGNOSIS — Z11.59 ENCOUNTER FOR SCREENING FOR OTHER VIRAL DISEASES: ICD-10-CM

## 2024-01-15 DIAGNOSIS — R93.2 ABNORMAL FINDINGS ON DIAGNOSTIC IMAGING OF LIVER: ICD-10-CM

## 2024-01-15 PROCEDURE — 99203 OFFICE O/P NEW LOW 30 MIN: CPT | Performed by: FAMILY MEDICINE

## 2024-01-15 RX ORDER — LOSARTAN POTASSIUM 50 MG/1
50 TABLET ORAL 2 TIMES DAILY
Qty: 180 TABLET | Refills: 3 | Status: SHIPPED | OUTPATIENT
Start: 2024-01-15

## 2024-01-15 NOTE — PROGRESS NOTES
[facility] - Outpatient Consultation  Farnaz Martin 78 y.o. female MRN: [id number]  Encounter: [id number]          ASSESSMENT AND PLAN:      1. Abnormal findings on diagnostic imaging of liver  Patient was incidentally found to have a mild heterogeneous liver with slight lobulation along the left lobe on CTA chest c/f early cirrhosis. Subsequent RUQ US noted hepatomegaly but did not redemonstrate cirrhotic morphology. No radiographic evidence of portal hypertension. Recent US elastography shows F0-F1 fibrosis and S1 steatosis. No clinical or serologic evidence of chronic liver disease.    Discussed that imaging alone is not sufficient for the diagnosis of cirrhosis. Thoroughly discussed with the patient regarding whether further evaluation was necessary via MR elastography vs liver biopsy. Discussed the process/procedure and associated risks of both. Given her labs show no synthetic dysfunction or thrombocytopenia, as well as having had a quality U/S elastography (IQR/median 14.7%), it was agreed to defer additional testing with an MRE or liver biopsy and rather repeat a U/S elastography x1 year for surveillance.  She will follow-up after the completion of her US elastography in order to discuss results.    Additionally, ordered MELD labs (CBC, CMP, INR) to monitor her liver function, chronic hepatitis panel to screen for viral hepatitis and serologies to assess her immunity to hep A and B. She is fully aware of clinical signs/sxs's of chronic liver disease/hepatic decompensation and will promptly inform provider if experienceing such.     At the patient's request, Ms. Martin's plan of care was also discussed with her daughter, Ynes, over the phone and prior to the conclusion of her visit.     - Ambulatory referral to Gastroenterology  - CBC; Future  - Comprehensive metabolic panel; Future  - Protime-INR; Future  - Chronic Hepatitis Panel; Future  - Hepatitis A  antibody, total; Future  - Hepatitis B surface antibody; Future  - US elastography/UGAP; Future    2. Liver disease, unspecified  - Protime-INR; Future    3. Encounter for screening for other viral diseases  - Chronic Hepatitis Panel; Future  - Hepatitis B surface antibody; Future    4. Screening for colon cancer  Patient is up-to-date with CRC screening. Colonoscopy (8/2021) performed by Dr. Monsivais was notable for a sub-cm polyp in the rectosigmoid junction and mild diverticulosis. Recommended repeat colonoscopy x 5 years.    Follow-up in after completion of repeat U/S elastography (~12/2024).     ______________________________________________________________________    HPI: Patient is a 78 y.o. female with PMH significant for Hurthle cell adenoma of the thyroid, diastolic CHF, HTN, HLD, A-fib on Eliquis, valvular disease, MGUS, meningioma, chronic hyponatremia and fibromyalgia who presents today for a consultation regarding abnormal imaging of the liver.    Patient was admitted in August 2023 due to A-fib with RVR and incidentally found to have a mildly heterogeneous liver with slight lobulation along the left lobe on CTA chest c/f early cirrhosis. She subsequently had a RUQ US (8/22/2023) notable for moderate hepatomegaly (8.2 cm) but otherwise without cirrhotic morphology or radiographic evidence of portal hypertension. She later had a US elastography (12/20/2023) notable for F0-F1 fibrosis and S1 steatosis. She was also noted to have an 8 mm gallbladder polyp, increased in size from RUQ US in August 2023) for which a repeat ultrasound x12 months was recommended.    She was more recently admitted in January 2024 for palpitations and again had a CTA chest which demonstrated a lobulated hepatic contour c/f cirrhosis. Patient's hepatic function has been historically within normal limits. Serologies are also without thrombocytopenia or chronic hypoalbuminemia. She has noted to have chronic hyponatremia since April  2022.    Denies personal history of liver disease. Denies a family history of liver disease or liver related cancers. Denies known past or current infection with viral hepatitis. Denies a prior liver biopsy. Denies pruritus, confusion, dark urine, bruising easily, yellowing of the eyes and/or skin, new/worsening abdominal distension or swelling of the lower extremities, abdominal pain, overt evidence of GI bleeding or unintentional weight loss.      REVIEW OF SYSTEMS:    CONSTITUTIONAL: Denies any fever, chills, rigors, and weight loss.  HEENT: No earache or tinnitus. Denies hearing loss or visual disturbances.  CARDIOVASCULAR: No chest pain or palpitations.   RESPIRATORY: Denies any cough, hemoptysis, shortness of breath or dyspnea on exertion.  GASTROINTESTINAL: As noted in the History of Present Illness.   GENITOURINARY: No problems with urination. Denies any hematuria or dysuria.  NEUROLOGIC: No dizziness or vertigo, denies headaches.   MUSCULOSKELETAL: Denies any muscle or joint pain.   SKIN: Denies skin rashes or itching.   ENDOCRINE: Denies excessive thirst. Denies intolerance to heat or cold.  PSYCHOSOCIAL: Denies depression or anxiety. Denies any recent memory loss.       Historical Information   Past Medical History:   Diagnosis Date   • Actinic keratosis     last assessed - 06Jun2014   • Arthritis    • Atrial fibrillation with rapid ventricular response (HCC)     last assessed - 26Apr2016   • Basal cell carcinoma    • Benign colon polyp     last assessed - 27Apr2015   • CHF (congestive heart failure) (HCC) 06/2022   • Disease of thyroid gland    • Effusion of knee joint right     Resolved - 19Apr2016   • Esophageal reflux    • Fibromyalgia     last assessed - 27Dec2017   • Fibromyalgia, primary    • GERD (gastroesophageal reflux disease)    • Hypertension    • Palpitations     last assessed - 30Apr2013   • Peroneal tendonitis, right     last assessed - 02Oct2013   • Right lumbar radiculopathy 03/17/2016    • Thyroid cancer (HCC)    • Thyroid nodule    • Trochanteric bursitis of left hip 03/09/2018     Past Surgical History:   Procedure Laterality Date   • CARDIAC ELECTROPHYSIOLOGY STUDY AND ABLATION  08/2022   • CATARACT EXTRACTION Bilateral    • COLONOSCOPY  03/2018   • COLONOSCOPY W/ POLYPECTOMY  2021    repeat in 5 years   • EYE SURGERY     • HYSTERECTOMY     • JOINT REPLACEMENT Left     knee   • LA NEUROPLASTY &/TRANSPOS MEDIAN NRV CARPAL TUNNE Right 11/14/2019    Procedure: RELEASE CARPAL TUNNEL;  Surgeon: Onesimo Tate MD;  Location: [facility];  Service: Orthopedics   • LA TOTAL THYROID LOBECTOMY UNI W/WO ISTHMUSECTOMY Left 12/16/2020    Procedure: Left THYROID LOBECTOMY;  Surgeon: Jhony Bhatt MD;  Location: [facility];  Service: ENT   • RECTAL POLYPECTOMY     • REPLACEMENT TOTAL KNEE Left     last assessed - 27Apr2015   • TONSILLECTOMY     • TOTAL ABDOMINAL HYSTERECTOMY     • TUBAL LIGATION     • US GUIDED THYROID BIOPSY  10/14/2020     Social History   Social History     Substance and Sexual Activity   Alcohol Use Not Currently     Social History     Substance and Sexual Activity   Drug Use Never     Social History     Tobacco Use   Smoking Status Never   Smokeless Tobacco Never     Family History   Problem Relation Age of Onset   • Heart disease Mother    • Diabetes Mother    • Heart disease Father    • Coronary artery disease Father    • Stroke Father         cerebrovascular accident   • Heart attack Father         myocardial infarction   • Sudden death Father         scd   • Other Family         Back disorder   • Coronary artery disease Family    • Neuropathy Family    • Osteoporosis Family    • No Known Problems Daughter    • No Known Problems Maternal Grandmother    • No Known Problems Maternal Grandfather    • No Known Problems Paternal Grandmother    • No Known Problems Paternal Grandfather    • Cancer Maternal Uncle    • Breast cancer Maternal Aunt 65   • No Known Problems Son    • No Known  "Problems Maternal Aunt    • No Known Problems Maternal Aunt    • No Known Problems Maternal Aunt    • No Known Problems Paternal Aunt    • No Known Problems Paternal Aunt    • Anuerysm Neg Hx    • Clotting disorder Neg Hx    • Arrhythmia Neg Hx    • Heart failure Neg Hx        Meds/Allergies       Current Outpatient Medications:   •  ascorbic acid (VITAMIN C) 500 mg tablet  •  Cetirizine HCl (ZyrTEC Allergy) 10 MG CAPS  •  Cholecalciferol 50 MCG (2000 UT) CAPS  •  Chromium Picolinate (CHROMIUM PICOLATE PO)  •  diltiazem (CARDIZEM CD) 120 mg 24 hr capsule  •  DULoxetine (CYMBALTA) 30 mg delayed release capsule  •  ezetimibe (ZETIA) 10 mg tablet  •  flecainide (TAMBOCOR) 100 mg tablet  •  furosemide (LASIX) 20 mg tablet  •  gabapentin (NEURONTIN) 300 mg capsule  •  metoprolol succinate (TOPROL-XL) 100 mg 24 hr tablet  •  rOPINIRole (REQUIP) 1 mg tablet  •  Saccharomyces boulardii (Probiotic) 250 MG CAPS  •  TURMERIC PO  •  zolpidem (AMBIEN) 10 mg tablet  •  apixaban (ELIQUIS) 5 mg  •  ipratropium (ATROVENT) 0.03 % nasal spray  •  losartan (COZAAR) 50 mg tablet    Allergies   Allergen Reactions   • Sotalol Other (See Comments)     Prolonged QTc leading to torsades de pointes    • Penicillins Other (See Comments)     As a child calcium deposit in the arm    • Ace Inhibitors GI Intolerance     Did feel well on it   • Omeprazole Abdominal Pain, Rash and Vomiting     stomach pain, vomiting, rash           Objective     Blood pressure 148/90, pulse 62, temperature (!) 96.4 °F (35.8 °C), temperature source Tympanic, height 5' 2\" (1.575 m), weight 71.6 kg (157 lb 12.8 oz), last menstrual period 02/01/1990, not currently breastfeeding. Body mass index is 28.86 kg/m².        PHYSICAL EXAM:      General Appearance:   Alert, cooperative, no distress   HEENT:   Normocephalic, atraumatic, anicteric.     Neck:  Supple, symmetrical, trachea midline   Lungs:   Clear to auscultation bilaterally; no rales, rhonchi or wheezing; " respirations unlabored    Heart::   Regular rate and rhythm; no murmur, rub, or gallop.   Abdomen:   Soft, non-tender, non-distended; normal bowel sounds; no masses, no organomegaly    Genitalia:   Deferred    Rectal:   Deferred    Extremities:  No cyanosis, clubbing or edema    Pulses:  2+ and symmetric    Skin:  No jaundice, rashes, or lesions    Lymph nodes:  No palpable cervical lymphadenopathy        Lab Results:   No visits with results within 1 Day(s) from this visit.   Latest known visit with results is:   Admission on 01/05/2024, Discharged on 01/06/2024   Component Date Value   • Ventricular Rate 01/05/2024 121    • Atrial Rate 01/05/2024 121    • PA Interval 01/05/2024 120    • QRSD Interval 01/05/2024 204    • QT Interval 01/05/2024 410    • QTC Interval 01/05/2024 582    • QRS Axis 01/05/2024 -34    • T Wave Axis 01/05/2024 97    • WBC 01/05/2024 5.35    • RBC 01/05/2024 4.33    • Hemoglobin 01/05/2024 12.5    • Hematocrit 01/05/2024 37.6    • MCV 01/05/2024 87    • MCH 01/05/2024 28.9    • MCHC 01/05/2024 33.2    • RDW 01/05/2024 17.4 (H)    • MPV 01/05/2024 9.5    • Platelets 01/05/2024 257    • nRBC 01/05/2024 0    • Neutrophils Relative 01/05/2024 53    • Immat GRANS % 01/05/2024 0    • Lymphocytes Relative 01/05/2024 28    • Monocytes Relative 01/05/2024 15 (H)    • Eosinophils Relative 01/05/2024 3    • Basophils Relative 01/05/2024 1    • Neutrophils Absolute 01/05/2024 2.86    • Immature Grans Absolute 01/05/2024 0.01    • Lymphocytes Absolute 01/05/2024 1.48    • Monocytes Absolute 01/05/2024 0.82    • Eosinophils Absolute 01/05/2024 0.14    • Basophils Absolute 01/05/2024 0.04    • Protime 01/05/2024 16.5 (H)    • INR 01/05/2024 1.26 (H)    • PTT 01/05/2024 33    • Sodium 01/05/2024 129 (L)    • Potassium 01/05/2024 4.1    • Chloride 01/05/2024 93 (L)    • CO2 01/05/2024 25    • ANION GAP 01/05/2024 11    • BUN 01/05/2024 19    • Creatinine 01/05/2024 0.85    • Glucose 01/05/2024 123    •  Calcium 01/05/2024 9.0    • AST 01/05/2024 24    • ALT 01/05/2024 27    • Alkaline Phosphatase 01/05/2024 89    • Total Protein 01/05/2024 7.3    • Albumin 01/05/2024 4.2    • Total Bilirubin 01/05/2024 0.55    • eGFR 01/05/2024 65    • Magnesium 01/05/2024 1.7 (L)    • TSH 3RD GENERATON 01/05/2024 3.644    • hs TnI 0hr 01/05/2024 7    • hs TnI 2hr 01/05/2024 8    • Delta 2hr hsTnI 01/05/2024 1    • hs TnI 4hr 01/05/2024 8    • Delta 4hr hsTnI 01/05/2024 1    • T3, Total 01/05/2024 1.2    • Free T4 01/05/2024 1.02    • Sodium 01/06/2024 131 (L)    • Potassium 01/06/2024 3.9    • Chloride 01/06/2024 97    • CO2 01/06/2024 27    • ANION GAP 01/06/2024 7    • BUN 01/06/2024 17    • Creatinine 01/06/2024 0.79    • Glucose 01/06/2024 124    • Calcium 01/06/2024 8.7    • AST 01/06/2024 18    • ALT 01/06/2024 24    • Alkaline Phosphatase 01/06/2024 78    • Total Protein 01/06/2024 6.4    • Albumin 01/06/2024 3.7    • Total Bilirubin 01/06/2024 0.67    • eGFR 01/06/2024 71    • Magnesium 01/06/2024 1.9    • Phosphorus 01/06/2024 3.7    • WBC 01/06/2024 4.87    • RBC 01/06/2024 3.98    • Hemoglobin 01/06/2024 11.4 (L)    • Hematocrit 01/06/2024 35.2    • MCV 01/06/2024 88    • MCH 01/06/2024 28.6    • MCHC 01/06/2024 32.4    • RDW 01/06/2024 17.6 (H)    • MPV 01/06/2024 9.6    • Platelets 01/06/2024 256    • nRBC 01/06/2024 0    • Neutrophils Relative 01/06/2024 44    • Immat GRANS % 01/06/2024 0    • Lymphocytes Relative 01/06/2024 37    • Monocytes Relative 01/06/2024 14 (H)    • Eosinophils Relative 01/06/2024 4    • Basophils Relative 01/06/2024 1    • Neutrophils Absolute 01/06/2024 2.17    • Immature Grans Absolute 01/06/2024 0.01    • Lymphocytes Absolute 01/06/2024 1.80    • Monocytes Absolute 01/06/2024 0.67    • Eosinophils Absolute 01/06/2024 0.17    • Basophils Absolute 01/06/2024 0.05    • Ventricular Rate 01/05/2024 130    • Atrial Rate 01/05/2024 130    • SD Interval 01/05/2024 136    • QRSD Interval  01/05/2024 198    • QT Interval 01/05/2024 382    • QTC Interval 01/05/2024 562    • QRS Axis 01/05/2024 144    • T Wave Axis 01/05/2024 99          Radiology Results:   XR chest 1 view portable    Result Date: 1/6/2024  Narrative: CHEST INDICATION:   Palpitations. History of atrial fibrillation. COMPARISON: Chest radiographs 1/4/2024 EXAM PERFORMED/VIEWS:  XR CHEST PORTABLE AP semierect Images:  1 FINDINGS: Patient is rotated. Monitoring leads and clips project over the chest. Pacemaker generator pack projects over the left hemithorax. Leads appear intact with distal tips projecting over the right atrium and right ventricle. Cardiomediastinal silhouette is within normal limits allowing for technique. Subsegmental linear atelectasis in the right mid to lower lung field. The visualized lungs are otherwise clear.  No pneumothorax or pleural effusion. Osseous structures appear within normal limits for patient age.     Impression: No acute cardiopulmonary disease. Resident: LJ MOURA I, the attending radiologist, have reviewed the images and agree with the final report above. Workstation performed: [id number]     CTA ED chest PE study    Result Date: 1/5/2024  Narrative: CTA - CHEST WITH IV CONTRAST - PULMONARY ANGIOGRAM INDICATION:   palpitations, dyspnea with exersion, tachycardia. COMPARISON: 8/4/2023. TECHNIQUE: CTA examination of the chest was performed using angiographic technique according to a protocol specifically tailored to evaluate for pulmonary embolism.  Multiplanar 2D reformatted images were created from the source data. In addition, coronal 3D MIP postprocessing was performed on the acquisition scanner. Radiation dose length product (DLP) for this visit:  381 mGy-cm .  This examination, like all CT scans performed in the [facility], was performed utilizing techniques to minimize radiation dose exposure, including the use of iterative reconstruction and automated exposure control.  IV Contrast:  70 mL of iohexol (OMNIPAQUE) FINDINGS: PULMONARY ARTERIAL TREE:  No pulmonary embolus is seen. LUNGS: Lingular segment left upper lobe discoid atelectasis. Mild bibasilar dependent changes noted. Right greater than left bibasilar mild bronchiectasis suggesting sequelae of previous infectious or inflammatory process. PLEURA:  Unremarkable. HEART/GREAT VESSELS:  Unremarkable for patient's age. Left biventricular pacer noted. No thoracic aortic aneurysm. MEDIASTINUM AND BETTY:  Unremarkable. CHEST WALL AND LOWER NECK:   Stable right thyroid nodules, largest 2.1 cm. Thyroid recommendation as before. VISUALIZED STRUCTURES IN THE UPPER ABDOMEN:  Lobulated hepatic contour suspicious for cirrhosis. OSSEOUS STRUCTURES:  No acute fracture or destructive osseous lesion.     Impression: No PE. Incidental thyroid nodule(s) for which nonemergent thyroid ultrasound is recommended. The study was marked in EPIC for significant notification. Workstation performed: [id number]     Cardiac EP device report    Result Date: 1/5/2024  Narrative: MDT-DUAL CHAMBER PPM (AAIR-DDDR MODE)/ ACTIVE SYSTEM IS MRI CONDITIONAL CARELINK TRANSMISSION DUE TO SYMPTOMS-ELEVATED HR: BATTERY VOLTAGE ADEQUATE (10.8 YRS). AP 91.1%  7.3% ALL AVAILABLE LEAD PARAMETERS WITHIN NORMAL LIMITS. NO SIGNIFICANT HIGH RATE EPISODES. PRESENTING RHYTHM ST  BPM. NORMAL DEVICE FUNCTION. AM     XR chest pa & lateral    Result Date: 1/4/2024  Narrative: CHEST INDICATION:   Cough, unspecified. COMPARISON:  Comparison made with previous examination(s) dated (SR) 20-Dec-2023,(DX) 04-Aug-2023,(CT) 04-Aug-2023,(NM) 05-Oct-2022,(CR) 13-Jun-2022. EXAM PERFORMED/VIEWS:  XR CHEST PA & LATERAL FINDINGS: Cardiomediastinal silhouette appears enlarged. Dual-chamber pacemaker in place.. The lungs are clear.  No pneumothorax or pleural effusion. Osseous structures appear within normal limits for patient age.     Impression: No acute cardiopulmonary disease. Cardiomegaly  with dual-chamber pacemaker noted. No significant change from prior study of 8//2023. Electronically signed: 01/04/2024 05:45 PM Iftikhar Leborn MD    US elastography/UGAP    Result Date: 12/27/2023  Narrative: ELASTOGRAPHY, LIVER ULTRASOUND INDICATION:   R16.0: Hepatomegaly, not elsewhere classified. COMPARISON: 8/5/2023 TECHNIQUE: Targeted ultrasound of the liver was performed with a curvilinear transducer on a Who@ e10 utilizing 2D SWE.  A total of 10 shear wave Elastography samples were obtained. FINDINGS: Shear Wave Elastography sampling was performed to evaluate stiffness changes within the liver associated with fibrosis. E1  6.94 kPa E2  5.53 kPa E3  5.92 kPa E4  6.93 kPa E5  6.13 kPa E6  7.69 kPa E7  7.07 kPa E8  6.54 kPa E9  5.73 kPa E10 6.57 kPa E median:  6.56 kPa. The corresponding Metavir score is F0-F1(absent or mild fibrosis), range 0-8.26kPa. <1.66 m/s. IQR/median:  14.7 %.  (IQR of less than 30% is considered a satisfactory data set). Note: that the stage of liver fibrosis may be overestimated by clinical conditions unrelated to fibrosis, including but not limited to, nonfasting, hepatic inflammation, vascular congestion, biliary obstruction, and infiltrative liver disease. Furthermore, in some patients with NAFLD, the cut-off values for compensated advanced chronic liver disease may be lower (7-9 kPa). In causes other than viral hepatitis and nonalcoholic fatty liver disease, the cut-off values are not well established. When comparing measurements across time, a clinically significant change should be considered when the delta change is greater than 10%. In all these conditions, however, liver stiffness values within the normal range exclude significant liver fibrosis. Ultrasound-guided attenuation parameter (UGAP) Liver Steatosis Grading A1  0.64 dB/cm/MHz A2  0.68 dB/cm/MHz A3  0.67 dB/cm/MHz A4  0.65 dB/cm/MHz A5  0.62 dB/cm/MHz A6  0.62 dB/cm/MHz A7  0.65 dB/cm/MHz A8  0.69 dB/cm/MHz A9  0.67  dB/cm/MHz A10 0.63 dB/cm/MHz Attenuation coefficient:  0.65 dB/cm/MHz Liver steatosis grading:  S1 ( 5% - 33% steatosis) range 0.65-0.70 dB/cm/MHz IQR/Med:  5.5 % (Less than or equal to 30% is a satisfactory data set.) Reference White paper for GE ultrasound-guided attenuation parameter [email or web address]-[email or web address] 8 mm sessile gallbladder polyp again seen.     Impression: Metavir score: F0 - F1, Absent or Mild Fibrosis According to the updated SRU consensus statement, a liver stiffness of 6.56 kPa, which in the absence of other known clinical signs*, rules out compensated advanced chronic liver disease (cACLD). If there are known clinical signs, may need further test for confirmation. https://pubs.rsna.org/doi/10.1148/radiol.3374955545 Liver steatosis grading:  S1 ( 5% - 33% steatosis) 8 mm gallbladder polyp again seen It is sessile in appearance. According to current consensus recommendations (SRU 2022; 000:1-12), for polyps of this size (7-9 mm) which have a low risk morphology (pedunculated with thick/wide stalk, or sessile), follow-up ultrasound is recommended at 12 months to ensure stability. Reference: Management of Incidentally Detected Gallbladder Polyps: Society of Radiologists in Ultrasound Consensus Conference Recommendations. Radiology 2022; 000:1-12. https://pubs.rsna.org/doi/full/10.1148/radiol.210838 The study was marked in EPIC for significant notification. Workstation performed: [id number]     US thyroid    Result Date: 12/27/2023  Narrative: THYROID ULTRASOUND INDICATION:    E04.1: Nontoxic single thyroid nodule. COMPARISON: Thyroid ultrasound 9/25/2020 TECHNIQUE:   Ultrasound of the thyroid was performed with a high frequency linear transducer in transverse and sagittal planes including volumetric imaging sweeps as well as traditional still imaging technique. FINDINGS:  Thyroid parenchyma is diffusely heterogeneous in echotexture.  Right lobe: 5.3 x 3.0 x 2.4 cm. Volume 18.5 mL Left lobe: Surgically absent. Isthmus: 0.4  cm. Nodule #1.  Image 21. RIGHT midgland anterior nodule measuring 1.8 x 1.4 x 1.3 cm. Not well delineated on the prior study. COMPOSITION:  2 points, solid or almost completely solid . ECHOGENICITY:  1 point, hyperechoic or isoechoic. SHAPE:  0 points, wider-than-tall. MARGIN: 0 points, ill-defined. ECHOGENIC FOCI:  0 points, none or large comet-tail artifacts. TI-RADS Classification: TR 3 (3 points), FNA if >2.5 cm.  Follow if >1.5 cm. Nodule #2.  Image 26. RIGHT midgland posterior nodule measuring 0.9 x 0.8 x 1.0 cm.  Given differences in measuring technique, no significant change from prior. COMPOSITION:  2 points, solid or almost completely solid . ECHOGENICITY:  2 points, hypoechoic. SHAPE:  0 points, wider-than-tall. MARGIN: 0 points, ill-defined. ECHOGENIC FOCI:  0 points, none or large comet-tail artifacts. TI-RADS Classification: TR 4 (4-6 points), FNA if > 1.5 cm. Follow if > 1cm. Nodule #3.  Image 29. RIGHT lower pole nodule measuring 2.5 x 1.7 x 2.5 cm. COMPOSITION:  1 point, mixed cystic and solid. ECHOGENICITY:  1 point, hyperechoic or isoechoic. SHAPE:  0 points, wider-than-tall. MARGIN: 0 points, ill-defined. ECHOGENIC FOCI:  0 points, none or large comet-tail artifacts. TI-RADS Classification: TR 2 (2 points), Not suspious. No FNA. There are additional nodules of lesser size and/or TI-RADS score.  These do not necessitate additional evaluation based on ACR criteria.     Impression: No nodule meets current ACR criteria for requiring biopsy but followup ultrasound is recommended in 1 year. Reference: ACR Thyroid Imaging, Reporting and Data System (TI-RADS): White Paper of the ACR TI-RADS Committee. J AM Maribel Radiol 2017;14:587-595. (additional recommendations based on American Thyroid Association 2015 guidelines.) This examination demonstrates a finding requiring imaging follow-up and was logged as such in  "Epic. Workstation performed: [id number]     Cardiac EP device report    Result Date: 12/21/2023  Narrative: MDT-DUAL CHAMBER PPM (AAIR-DDDR MODE)/ ACTIVE SYSTEM IS MRI CONDITIONAL CARELINK TRANSMISSION: BATTERY STATUS \"11 YRS.\" AP 96%  0%. ALL AVAILABLE LEAD PARAMETERS WITHIN NORMAL LIMITS. NO SIGNIFICANT HIGH RATE EPISODES. NORMAL DEVICE FUNCTION. MARILU Conroy PA-C     **Please note: Dictation voice to text software may have been used in the creation of this record. Occasional wrong word or “sound alike” substitutions may have occurred due to the inherent limitations of voice recognition software. Read the chart carefully and recognize, using context, where substitutions have occurred.**  "

## 2024-01-16 ENCOUNTER — TELEPHONE (OUTPATIENT)
Dept: CARDIOLOGY CLINIC | Facility: CLINIC | Age: 79
End: 2024-01-16

## 2024-01-16 NOTE — TELEPHONE ENCOUNTER
Hi, this is Farnaz Martin. My birthday is [birth year] and my phone is [phone]. . It's in regard to samples of 5 milligram. Ernie Shukla, I'm just calling to see if you have some for me and I was hoping to get them this week if possible. OK, I'll talk to you later. Thank you. Swathi.

## 2024-01-16 NOTE — TELEPHONE ENCOUNTER
Patient identifies themself on voicemail. Message was left informing patient they could stop by the [facility] office to  a 4 week supply of Eliquis 5 mg. 4 sample boxes will be set aside for the patient. Lot#:[id number] Exp: 05/2025

## 2024-01-17 ENCOUNTER — TELEPHONE (OUTPATIENT)
Dept: CARDIOLOGY CLINIC | Facility: CLINIC | Age: 79
End: 2024-01-17

## 2024-01-17 DIAGNOSIS — I48.0 PAROXYSMAL ATRIAL FIBRILLATION (HCC): ICD-10-CM

## 2024-01-17 NOTE — TELEPHONE ENCOUNTER
Refill was sent in to preferred pharmacy , and a message was left on patients voicemail notifying her of this information.

## 2024-01-17 NOTE — TELEPHONE ENCOUNTER
I was waiting to hear from you if you had any samples on Eliquis 5 m tablets. But I'm going to have to soon get an order and I tried to call the drug store and she said that  for some reason the doctor cancelled that prescription. So I need to have it. So if you could call me back then let me know what's going on with that And my phone number, [phone]. Thanks. Bye.  [facility] pharmacy   You received a voice mail from [facility].

## 2024-01-17 NOTE — TELEPHONE ENCOUNTER
Eliquis refills requested by patient and submitted to pharmacy. Patient was contacted and reports no difficulty receiving her Eliquis or in meeting her deductible. Samples were reshelved and not dispensed.

## 2024-01-22 ENCOUNTER — APPOINTMENT (OUTPATIENT)
Dept: LAB | Facility: CLINIC | Age: 79
End: 2024-01-22
Payer: MEDICARE

## 2024-01-22 ENCOUNTER — TELEPHONE (OUTPATIENT)
Dept: CARDIOLOGY CLINIC | Facility: CLINIC | Age: 79
End: 2024-01-22

## 2024-01-22 DIAGNOSIS — Z11.59 ENCOUNTER FOR SCREENING FOR OTHER VIRAL DISEASES: ICD-10-CM

## 2024-01-22 DIAGNOSIS — E87.1 HYPONATREMIA: ICD-10-CM

## 2024-01-22 DIAGNOSIS — I50.32 CHRONIC DIASTOLIC CHF (CONGESTIVE HEART FAILURE) (HCC): ICD-10-CM

## 2024-01-22 DIAGNOSIS — R93.2 ABNORMAL FINDINGS ON DIAGNOSTIC IMAGING OF LIVER: ICD-10-CM

## 2024-01-22 DIAGNOSIS — K76.9 LIVER DISEASE, UNSPECIFIED: ICD-10-CM

## 2024-01-22 DIAGNOSIS — D47.2 MGUS (MONOCLONAL GAMMOPATHY OF UNKNOWN SIGNIFICANCE): ICD-10-CM

## 2024-01-22 LAB
ALBUMIN SERPL BCP-MCNC: 4.3 G/DL (ref 3.5–5)
ALP SERPL-CCNC: 88 U/L (ref 34–104)
ALT SERPL W P-5'-P-CCNC: 16 U/L (ref 7–52)
ANION GAP SERPL CALCULATED.3IONS-SCNC: 9 MMOL/L
AST SERPL W P-5'-P-CCNC: 17 U/L (ref 13–39)
BASOPHILS # BLD AUTO: 0.06 THOUSANDS/ÂΜL (ref 0–0.1)
BASOPHILS NFR BLD AUTO: 1 % (ref 0–1)
BILIRUB SERPL-MCNC: 1.03 MG/DL (ref 0.2–1)
BNP SERPL-MCNC: 266 PG/ML (ref 0–100)
BUN SERPL-MCNC: 18 MG/DL (ref 5–25)
CALCIUM SERPL-MCNC: 9.8 MG/DL (ref 8.4–10.2)
CHLORIDE SERPL-SCNC: 95 MMOL/L (ref 96–108)
CO2 SERPL-SCNC: 29 MMOL/L (ref 21–32)
CREAT SERPL-MCNC: 0.77 MG/DL (ref 0.6–1.3)
EOSINOPHIL # BLD AUTO: 0.21 THOUSAND/ÂΜL (ref 0–0.61)
EOSINOPHIL NFR BLD AUTO: 4 % (ref 0–6)
ERYTHROCYTE [DISTWIDTH] IN BLOOD BY AUTOMATED COUNT: 15.9 % (ref 11.6–15.1)
GFR SERPL CREATININE-BSD FRML MDRD: 73 ML/MIN/1.73SQ M
GLUCOSE P FAST SERPL-MCNC: 108 MG/DL (ref 65–99)
HAV AB SER QL IA: NORMAL
HBV CORE AB SER QL: NORMAL
HBV CORE IGM SER QL: NORMAL
HBV SURFACE AB SER-ACNC: <3 MIU/ML
HBV SURFACE AG SER QL: NORMAL
HCT VFR BLD AUTO: 39.7 % (ref 34.8–46.1)
HCV AB SER QL: NORMAL
HGB BLD-MCNC: 12.8 G/DL (ref 11.5–15.4)
IGA SERPL-MCNC: 515 MG/DL (ref 66–433)
IGG SERPL-MCNC: 910 MG/DL (ref 635–1741)
IGM SERPL-MCNC: 53 MG/DL (ref 45–281)
IMM GRANULOCYTES # BLD AUTO: 0.01 THOUSAND/UL (ref 0–0.2)
IMM GRANULOCYTES NFR BLD AUTO: 0 % (ref 0–2)
INR PPP: 1.21 (ref 0.84–1.19)
LYMPHOCYTES # BLD AUTO: 0.65 THOUSANDS/ÂΜL (ref 0.6–4.47)
LYMPHOCYTES NFR BLD AUTO: 13 % (ref 14–44)
MCH RBC QN AUTO: 28.6 PG (ref 26.8–34.3)
MCHC RBC AUTO-ENTMCNC: 32.2 G/DL (ref 31.4–37.4)
MCV RBC AUTO: 89 FL (ref 82–98)
MONOCYTES # BLD AUTO: 0.74 THOUSAND/ÂΜL (ref 0.17–1.22)
MONOCYTES NFR BLD AUTO: 15 % (ref 4–12)
NEUTROPHILS # BLD AUTO: 3.27 THOUSANDS/ÂΜL (ref 1.85–7.62)
NEUTS SEG NFR BLD AUTO: 67 % (ref 43–75)
NRBC BLD AUTO-RTO: 0 /100 WBCS
PLATELET # BLD AUTO: 296 THOUSANDS/UL (ref 149–390)
PMV BLD AUTO: 9.9 FL (ref 8.9–12.7)
POTASSIUM SERPL-SCNC: 4.2 MMOL/L (ref 3.5–5.3)
PROT SERPL-MCNC: 7.2 G/DL (ref 6.4–8.4)
PROTHROMBIN TIME: 15.2 SECONDS (ref 11.6–14.5)
RBC # BLD AUTO: 4.47 MILLION/UL (ref 3.81–5.12)
SODIUM SERPL-SCNC: 133 MMOL/L (ref 135–147)
WBC # BLD AUTO: 4.94 THOUSAND/UL (ref 4.31–10.16)

## 2024-01-22 PROCEDURE — 86803 HEPATITIS C AB TEST: CPT

## 2024-01-22 PROCEDURE — 85025 COMPLETE CBC W/AUTO DIFF WBC: CPT

## 2024-01-22 PROCEDURE — 83521 IG LIGHT CHAINS FREE EACH: CPT

## 2024-01-22 PROCEDURE — 80053 COMPREHEN METABOLIC PANEL: CPT

## 2024-01-22 PROCEDURE — 36415 COLL VENOUS BLD VENIPUNCTURE: CPT

## 2024-01-22 PROCEDURE — 84165 PROTEIN E-PHORESIS SERUM: CPT

## 2024-01-22 PROCEDURE — 86706 HEP B SURFACE ANTIBODY: CPT

## 2024-01-22 PROCEDURE — 82784 ASSAY IGA/IGD/IGG/IGM EACH: CPT

## 2024-01-22 PROCEDURE — 87340 HEPATITIS B SURFACE AG IA: CPT

## 2024-01-22 PROCEDURE — 86705 HEP B CORE ANTIBODY IGM: CPT

## 2024-01-22 PROCEDURE — 85610 PROTHROMBIN TIME: CPT

## 2024-01-22 PROCEDURE — 86704 HEP B CORE ANTIBODY TOTAL: CPT

## 2024-01-22 PROCEDURE — 83880 ASSAY OF NATRIURETIC PEPTIDE: CPT

## 2024-01-22 PROCEDURE — 86334 IMMUNOFIX E-PHORESIS SERUM: CPT

## 2024-01-22 PROCEDURE — 86708 HEPATITIS A ANTIBODY: CPT

## 2024-01-22 NOTE — TELEPHONE ENCOUNTER
----- Message from JENNY Cruz sent at 1/22/2024  2:19 PM EST -----  BNP, (marker of heart failure) was 266.  Previous was 153;  overall down from 5 months ago when it was 651  Favor no change in medications  Please call the patient and encourage a salt restricted diet, daily weights   Advised she can take Lasix 20 mg as needed for weight gain 3 pounds in 24 hrs

## 2024-01-23 DIAGNOSIS — G47.01 INSOMNIA DUE TO MEDICAL CONDITION: ICD-10-CM

## 2024-01-23 LAB
ALBUMIN SERPL ELPH-MCNC: 4.05 G/DL (ref 3.2–5.1)
ALBUMIN SERPL ELPH-MCNC: 57.9 % (ref 48–70)
ALPHA1 GLOB SERPL ELPH-MCNC: 0.29 G/DL (ref 0.15–0.47)
ALPHA1 GLOB SERPL ELPH-MCNC: 4.1 % (ref 1.8–7)
ALPHA2 GLOB SERPL ELPH-MCNC: 0.73 G/DL (ref 0.42–1.04)
ALPHA2 GLOB SERPL ELPH-MCNC: 10.4 % (ref 5.9–14.9)
BETA GLOB ABNORMAL SERPL ELPH-MCNC: 0.52 G/DL (ref 0.31–0.57)
BETA1 GLOB SERPL ELPH-MCNC: 7.4 % (ref 4.7–7.7)
BETA2 GLOB SERPL ELPH-MCNC: 6.7 % (ref 3.1–7.9)
BETA2+GAMMA GLOB SERPL ELPH-MCNC: 0.47 G/DL (ref 0.2–0.58)
GAMMA GLOB ABNORMAL SERPL ELPH-MCNC: 0.95 G/DL (ref 0.4–1.66)
GAMMA GLOB SERPL ELPH-MCNC: 13.5 % (ref 6.9–22.3)
IGG/ALB SER: 1.38 {RATIO} (ref 1.1–1.8)
INTERPRETATION UR IFE-IMP: NORMAL
KAPPA LC FREE SER-MCNC: 27.2 MG/L (ref 3.3–19.4)
KAPPA LC FREE/LAMBDA FREE SER: 1.33 {RATIO} (ref 0.26–1.65)
LAMBDA LC FREE SERPL-MCNC: 20.4 MG/L (ref 5.7–26.3)
M PROTEIN 1 MFR SERPL ELPH: 2.2 %
M PROTEIN 1 SERPL ELPH-MCNC: 0.15 G/DL
PROT PATTERN SERPL ELPH-IMP: NORMAL
PROT SERPL-MCNC: 7 G/DL (ref 6.4–8.2)

## 2024-01-23 PROCEDURE — 86334 IMMUNOFIX E-PHORESIS SERUM: CPT | Performed by: PATHOLOGY

## 2024-01-23 PROCEDURE — 84165 PROTEIN E-PHORESIS SERUM: CPT | Performed by: PATHOLOGY

## 2024-01-23 RX ORDER — ZOLPIDEM TARTRATE 10 MG/1
10 TABLET ORAL
Qty: 90 TABLET | Refills: 1 | Status: SHIPPED | OUTPATIENT
Start: 2024-01-23

## 2024-01-23 NOTE — TELEPHONE ENCOUNTER
1 [id number] 11/16/2023 08/31/2023 Zolpidem Tartrate (Tablet) 90.0 90 10 MG NA KATLIN JOHNSON [facility] Medicare 1 / 1 PA

## 2024-01-23 NOTE — TELEPHONE ENCOUNTER
Reason for call:   [x] Refill   [] Prior Auth  [] Other:     Office:   [x] PCP/Provider - Alisia GASPAR / Loi  [] Specialty/Provider -     Medication: zolpidem    Dose/Frequency: 10mg qhs prn    Quantity: 90    Pharmacy: [id number] - [place] - [address]     Does the patient have enough for 3 days?   [x] Yes   [] No - Send as HP to POD

## 2024-01-24 NOTE — TELEPHONE ENCOUNTER
Farnaz Martin left a message on the [facility] line this morning. She said she received a call from you yesterday and she asked if you would call her back this morning.    Thanks.

## 2024-01-24 NOTE — TELEPHONE ENCOUNTER
I spoke with Farnaz, she verbalized understanding. Reiterated low sodium diet, daily weights and instructed when to take a Lasix PRN.

## 2024-01-25 ENCOUNTER — TELEPHONE (OUTPATIENT)
Age: 79
End: 2024-01-25

## 2024-01-25 ENCOUNTER — CONSULT (OUTPATIENT)
Dept: PULMONOLOGY | Facility: CLINIC | Age: 79
End: 2024-01-25
Payer: MEDICARE

## 2024-01-25 VITALS
TEMPERATURE: 100.2 F | OXYGEN SATURATION: 97 % | WEIGHT: 158.8 LBS | HEIGHT: 62 IN | HEART RATE: 64 BPM | SYSTOLIC BLOOD PRESSURE: 144 MMHG | BODY MASS INDEX: 29.22 KG/M2 | DIASTOLIC BLOOD PRESSURE: 82 MMHG

## 2024-01-25 DIAGNOSIS — J30.0 VASOMOTOR RHINITIS: ICD-10-CM

## 2024-01-25 DIAGNOSIS — J21.9 BRONCHIOLITIS: ICD-10-CM

## 2024-01-25 DIAGNOSIS — J47.9 BRONCHIECTASIS WITHOUT COMPLICATION (HCC): ICD-10-CM

## 2024-01-25 DIAGNOSIS — R05.3 CHRONIC COUGH: Primary | ICD-10-CM

## 2024-01-25 DIAGNOSIS — J31.0 CHRONIC RHINITIS: ICD-10-CM

## 2024-01-25 PROCEDURE — 99205 OFFICE O/P NEW HI 60 MIN: CPT | Performed by: INTERNAL MEDICINE

## 2024-01-25 RX ORDER — ACETYLCYSTEINE 100 MG/ML
4 SOLUTION ORAL; RESPIRATORY (INHALATION)
Qty: 90 ML | Refills: 2 | Status: SHIPPED | OUTPATIENT
Start: 2024-01-25 | End: 2024-04-24

## 2024-01-25 RX ORDER — ALBUTEROL SULFATE 2.5 MG/3ML
2.5 SOLUTION RESPIRATORY (INHALATION) EVERY 6 HOURS PRN
Qty: 1080 ML | Refills: 0 | Status: SHIPPED | OUTPATIENT
Start: 2024-01-25 | End: 2024-04-24

## 2024-01-25 RX ORDER — IPRATROPIUM BROMIDE 21 UG/1
2 SPRAY, METERED NASAL 3 TIMES DAILY
Qty: 90 ML | Refills: 3 | Status: SHIPPED | OUTPATIENT
Start: 2024-01-25

## 2024-01-25 NOTE — TELEPHONE ENCOUNTER
PA for acetylcysteine    Submitted via  []CMM-KEY    [x]SureShoutEm-Case ID # [id number]   []Faxed to plan   []Other website    []Phone call Case ID #      Office notes sent, clinical questions answered. Awaiting determination

## 2024-01-25 NOTE — TELEPHONE ENCOUNTER
"Dr. Mike this morning    Since being home, when she voids, feels burning. \"Is this associated with bronchiectasis?\"    I informed her that I cannot see the direct correlation but advise her communicating with primary to have better evaluated.     All questions answered. No further needs at this time.  "

## 2024-01-25 NOTE — TELEPHONE ENCOUNTER
PA for acetylcysteine Denied    Reason:(Screenshot if applicable)    Note from payer: Request Reference Number: [id number].  ACETYLCYST   SOL 10% is denied for not meeting the prior authorization requirement(s).  Details of this decision are in the notice attached below or have been faxed to you.               Message sent to office clinical pool Yes    Denial letter scanned into Media Yes    Appeal started No

## 2024-01-25 NOTE — PROGRESS NOTES
Consultation - Pulmonary Medicine   Farnaz Martin 78 y.o. female MRN: [id number]    Physician Requesting Consult: Naveen Monsivais  Reason for Consult: Chronic cough    Farnaz Martin is a 78 y.o. female hx Afib on AC, CHF, MR, thyroid ca sp resection, chronic rhinitis, HTN, PH, MGUS, who presents for evaluation of chronic cough.    # Chronic Cough  # Bronchiolitis  # Very mild bronchiectasis  # Chronic Rhinitis  Pt with chronic cough for 6-12 months. Imaging shows very minimal airway dilation (though read as mild bronchiectasis) and bibasilar tree in bud consistent with bronchiolitis could be from chronic aspiration vs prior infection. No prior hx lung disease, smoking, atypical exposures. Normal immunoglobulin panel and no hx systemic rheum diseases  Allergy panel neg, highest eos 400 historically suggests possible type 2 inflammation.     Will start with some workup and mucous clearance therapy    - Pfts w BD  - check barium swallow for aspiration  - mucous clearance: albuterol followed by mucomyst neb followed by flutter valve (provided) and palm coughing, do BID  --- send Afb and sputum culture  - continue nasal spray, allergy meds  - GERD precautions, wants to hold off on PPI    # PH  Likely due to CHF and MR  - continue cardiology follow up    Follow up in 2 months    Vaccines    Immunization History   Administered Date(s) Administered    COVID-19 MODERNA VACC 0.5 ML IM 01/27/2021, 02/24/2021, 11/23/2021    COVID-19 Moderna Vac BIVALENT 12 Yr+ IM 0.5 ML 12/09/2022    COVID-19 Moderna mRNA Vaccine 12 Yr+ 50 mcg/0.5 mL (Spikevax) 01/02/2024    INFLUENZA 12/23/2015, 11/07/2016, 10/18/2017, 12/04/2018, 09/19/2022    Influenza Split High Dose Preservative Free IM 10/01/2014, 12/23/2015, 11/07/2016, 10/18/2017    Influenza, high dose seasonal 0.7 mL 12/04/2018, 09/26/2019, 09/08/2020, 11/03/2021, 09/19/2022, 12/19/2023    Influenza, seasonal, injectable 10/16/2012    Pneumococcal Conjugate 13-Valent 04/27/2015     Pneumococcal Polysaccharide PPV23 03/29/2022        I have spent a total time of 55 minutes on 01/25/24 in caring for this patient including Diagnostic results, Instructions for management, Patient and family education, Importance of tx compliance, Risk factor reductions, Impressions, Counseling / Coordination of care, Documenting in the medical record, Reviewing / ordering tests, medicine, procedures  , and Obtaining or reviewing history  .   ______________________________________________________________________    HPI:    Farnaz Martin is a 78 y.o. female hx Afib on AC, CHF, MR, thyroid ca sp resection, chronic rhinitis, HTN, PH, MGUS, who presents for evaluation of chronic cough.    Nonsmoker, no lung problems or asthma  Was exposed to second hand smoke from   Cough started this summer  Occasionally some phlegm clear or yellow, never blood  No significant weight changes, but trying to lose weight  Did have one fever last week but no frequent fevers  No night sweats  Did have a runny nose, atrovent did not help  No SOB  Does have some pain with persistent coughing  No allergies  Some reflux, not taking anything  No significant cough with eating or drinking though does have some trouble swallowing pills  Still with runny nose  Formerly on ACE -I but not recently      Occupational/Exposure history:  Worked delivering flowers,  work, 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    Current Medications:    Current Outpatient Medications:     apixaban (ELIQUIS) 5 mg, Take 1 tablet (5 mg total) by mouth 2 (two) times a day, Disp: 60 tablet, Rfl: 3    ascorbic acid (VITAMIN C) 500 mg tablet, Take 500 mg by mouth daily , Disp: , Rfl:     Cetirizine HCl (ZyrTEC Allergy) 10 MG CAPS, Take 10 mg by mouth in the morning, Disp: , Rfl:     Chromium  Picolinate (CHROMIUM PICOLATE PO), Take 1 tablet by mouth daily, Disp: , Rfl:     diltiazem (CARDIZEM CD) 120 mg 24 hr capsule, Take 1 capsule (120 mg total) by mouth 2 (two) times a day You may take 1 additional capsule daily (120 mg) if you have palpitations and elevated heart rate consistent with your atrial flutter/fibrillation. Max 360 mg daily., Disp: 90 capsule, Rfl: 0    DULoxetine (CYMBALTA) 30 mg delayed release capsule, take 1 capsule by mouth once daily, Disp: 30 capsule, Rfl: 2    ezetimibe (ZETIA) 10 mg tablet, take 1 tablet by mouth once daily, Disp: 90 tablet, Rfl: 3    flecainide (TAMBOCOR) 100 mg tablet, Take 1 tablet (100 mg total) by mouth 2 (two) times a day, Disp: [id number] tablet, Rfl: 3    furosemide (LASIX) 20 mg tablet, Take 1 tablet (20 mg total) by mouth daily take 2 tablet by mouth daily if needed for shortness of breath WEIGHT GAIN 3 LBS IN 1 DAY OR LOWER LEG SWELLING, Disp: 90 tablet, Rfl: 3    gabapentin (NEURONTIN) 300 mg capsule, Take 1 capsule (300 mg total) by mouth daily at bedtime, Disp: 90 capsule, Rfl: 1    losartan (COZAAR) 50 mg tablet, take 1 tablet by mouth twice a day, Disp: 180 tablet, Rfl: 3    rOPINIRole (REQUIP) 1 mg tablet, Take 2 tablets (2 mg total) by mouth daily at bedtime, Disp: 180 tablet, Rfl: 1    Saccharomyces boulardii (Probiotic) 250 MG CAPS, Take 1 capsule by mouth daily, Disp: , Rfl:     TURMERIC PO, Take 1 capsule by mouth 2 (two) times a day, Disp: , Rfl:     zolpidem (AMBIEN) 10 mg tablet, Take 1 tablet (10 mg total) by mouth daily at bedtime as needed for sleep, Disp: 90 tablet, Rfl: 1    Cholecalciferol 50 MCG (2000 UT) CAPS, Take 2,000 Units by mouth 2 (two) times a day, Disp: , Rfl:     ipratropium (ATROVENT) 0.03 % nasal spray, 2 sprays into each nostril 3 (three) times a day Begin once per day, then progress to twice per day. Progress to three times a day as tolerated. (Patient not taking: Reported on 1/25/2024), Disp: 90 mL, Rfl: 3    metoprolol  succinate (TOPROL-XL) 100 mg 24 hr tablet, Take 1 tablet (100 mg total) by mouth every 12 (twelve) hours, Disp: , Rfl:     Historical Information   Past Medical History:   Diagnosis Date    Actinic keratosis     last assessed - 06Jun2014    Arthritis     Atrial fibrillation with rapid ventricular response (HCC)     last assessed - 26Apr2016    Basal cell carcinoma     Benign colon polyp     last assessed - 27Apr2015    CHF (congestive heart failure) (HCC) 06/2022    Disease of thyroid gland     Effusion of knee joint right     Resolved - 19Apr2016    Esophageal reflux     Fibromyalgia     last assessed - [id number]    Fibromyalgia, primary     GERD (gastroesophageal reflux disease)     Hypertension     Palpitations     last assessed - 30Apr2013    Peroneal tendonitis, right     last assessed - 02Oct2013    Right lumbar radiculopathy 03/17/2016    Thyroid cancer (HCC)     Thyroid nodule     Trochanteric bursitis of left hip 03/09/2018     Past Surgical History:   Procedure Laterality Date    CARDIAC ELECTROPHYSIOLOGY STUDY AND ABLATION  08/2022    CATARACT EXTRACTION Bilateral     COLONOSCOPY  03/2018    COLONOSCOPY W/ POLYPECTOMY  2021    repeat in 5 years    EYE SURGERY      HYSTERECTOMY      JOINT REPLACEMENT Left     knee    MI NEUROPLASTY &/TRANSPOS MEDIAN NRV CARPAL TUNNE Right 11/14/2019    Procedure: RELEASE CARPAL TUNNEL;  Surgeon: Onesimo Tate MD;  Location: [facility];  Service: Orthopedics    MI TOTAL THYROID LOBECTOMY UNI W/WO ISTHMUSECTOMY Left 12/16/2020    Procedure: Left THYROID LOBECTOMY;  Surgeon: Jhony Bhatt MD;  Location: [facility];  Service: ENT    RECTAL POLYPECTOMY      REPLACEMENT TOTAL KNEE Left     last assessed - 27Apr2015    TONSILLECTOMY      TOTAL ABDOMINAL HYSTERECTOMY      TUBAL LIGATION      US GUIDED THYROID BIOPSY  10/14/2020     Social History   Social History     Tobacco Use   Smoking Status Never   Smokeless Tobacco Never       Family History:   Family History   Problem  "Relation Age of Onset    Heart disease Mother     Diabetes Mother     Heart disease Father     Coronary artery disease Father     Stroke Father         cerebrovascular accident    Heart attack Father         myocardial infarction    Sudden death Father         scd    Other Family         Back disorder    Coronary artery disease Family     Neuropathy Family     Osteoporosis Family     No Known Problems Daughter     No Known Problems Maternal Grandmother     No Known Problems Maternal Grandfather     No Known Problems Paternal Grandmother     No Known Problems Paternal Grandfather     Cancer Maternal Uncle     Breast cancer Maternal Aunt 65    No Known Problems Son     No Known Problems Maternal Aunt     No Known Problems Maternal Aunt     No Known Problems Maternal Aunt     No Known Problems Paternal Aunt     No Known Problems Paternal Aunt     Anuerysm Neg Hx     Clotting disorder Neg Hx     Arrhythmia Neg Hx     Heart failure Neg Hx          PhysicalExamination:  Vitals:   /82 (BP Location: Right arm, Patient Position: Sitting, Cuff Size: Standard)   Pulse 64   Temp 100.2 °F (37.9 °C) (Tympanic)   Ht 5' 2\" (1.575 m)   Wt 72 kg (158 lb 12.8 oz)   LMP 02/01/1990 (Within Weeks)   SpO2 97%   BMI 29.04 kg/m²     Appearance -- NAD, speaking full sentences  HEENT -- anicteric sclera, clear OP, MMM  Neck -- no JVD  Heart -- RRR, no murmurs  Lungs -- mild bibasilar rhonchi, upper lung zones clear, coughing with deep breaths  Abdomen -- soft, NTND, +bs  Extremities -- WWP, no LE edema  Skin -- no rash  Neuro -- A&Ox3, wnl  Psych -- no obvious depression or hallucination        Diagnostic Data:  Labs:  I personally reviewed the most recent laboratory data pertinent to today's visit    Lab Results   Component Value Date    WBC 4.94 01/22/2024    HGB 12.8 01/22/2024    HCT 39.7 01/22/2024    MCV 89 01/22/2024     01/22/2024     Lab Results   Component Value Date    GLUCOSE 114 05/06/2015    CALCIUM 9.8 " 01/22/2024     05/06/2015    K 4.2 01/22/2024    CO2 29 01/22/2024    CL 95 (L) 01/22/2024    BUN 18 01/22/2024    CREATININE 0.77 01/22/2024     Lab Results   Component Value Date    IGE 53.9 12/20/2023     Lab Results   Component Value Date    ALT 16 01/22/2024    AST 17 01/22/2024    ALKPHOS 88 01/22/2024    BILITOT 0.78 05/06/2015       PFT results:  The most recent pulmonary function tests were reviewed.  None    Imaging:  I personally reviewed the images on the PAC system pertinent to today's visit  CTA Chest 1/2024: Lingular segment left upper lobe discoid atelectasis. Mild bibasilar dependent changes noted. Right greater than left bibasilar mild bronchiectasis suggesting sequelae of previous infectious or inflammatory process. (Similar to 6 month prior CT). No PE    Other studies:  TTE 12/2023: EF 55%, dilated LA,mod MR, mod TR, Ph with RVSP 53        Anabella Dougherty MD  [facility]

## 2024-01-25 NOTE — PATIENT INSTRUCTIONS
Mucous clearance: albuterol followed by mucomyst nebulizer followed by using flutter valve followed by coughing 5-10 times. Repeat Morning and night time  Breathing tests  Barium swallow  New nasal spray  Sputum collection (morning sample)

## 2024-01-26 ENCOUNTER — TELEPHONE (OUTPATIENT)
Age: 79
End: 2024-01-26

## 2024-01-26 DIAGNOSIS — R39.9 UTI SYMPTOMS: Primary | ICD-10-CM

## 2024-01-26 RX ORDER — NITROFURANTOIN 25; 75 MG/1; MG/1
100 CAPSULE ORAL 2 TIMES DAILY
Qty: 10 CAPSULE | Refills: 0 | Status: SHIPPED | OUTPATIENT
Start: 2024-01-26 | End: 2024-01-31 | Stop reason: SDUPTHER

## 2024-01-26 NOTE — TELEPHONE ENCOUNTER
Pt called, she thinks she has a UTI.  Pt started with burning with urination and cloudy urine yesterday.    She stated she called this morning at 930 am, I do not see any messages in chart regarding this.

## 2024-01-29 ENCOUNTER — TELEPHONE (OUTPATIENT)
Age: 79
End: 2024-01-29

## 2024-01-29 NOTE — TELEPHONE ENCOUNTER
Incoming patient call:    Patient calling with request for DME script to be sent to [facility]. Noted in chart that DME was already ordered.    Please send script to [facility].     All questions answered. No further needs at the moment.

## 2024-01-29 NOTE — TELEPHONE ENCOUNTER
1/26/24 NEBULIZER ORDERED     4th time patient calling today regarding Nebulizer order from DME.  DME company has not contacted patient.  Please contact [facility] regarding order/delivery.       None

## 2024-01-29 NOTE — TELEPHONE ENCOUNTER
Nebulizer solution has to go through medicare part B if the patient is not a in a longterm care facility. Acetylycsteine was dispensed from the pharmacy on 1/26/24

## 2024-01-30 ENCOUNTER — NURSE TRIAGE (OUTPATIENT)
Age: 79
End: 2024-01-30

## 2024-01-30 DIAGNOSIS — J47.9 BRONCHIECTASIS WITHOUT COMPLICATION (HCC): ICD-10-CM

## 2024-01-30 DIAGNOSIS — R05.3 CHRONIC COUGH: ICD-10-CM

## 2024-01-30 LAB
DME PARACHUTE DELIVERY DATE ACTUAL: NORMAL
DME PARACHUTE DELIVERY DATE REQUESTED: NORMAL
DME PARACHUTE ITEM DESCRIPTION: NORMAL
DME PARACHUTE ORDER STATUS: NORMAL
DME PARACHUTE SUPPLIER NAME: NORMAL
DME PARACHUTE SUPPLIER PHONE: NORMAL

## 2024-01-30 RX ORDER — ALBUTEROL SULFATE 90 UG/1
2 AEROSOL, METERED RESPIRATORY (INHALATION) EVERY 6 HOURS PRN
Qty: 8.5 G | Refills: 3 | Status: SHIPPED | OUTPATIENT
Start: 2024-01-30

## 2024-01-30 RX ORDER — SODIUM CHLORIDE FOR INHALATION 3 %
4 VIAL, NEBULIZER (ML) INHALATION 2 TIMES DAILY
Qty: 240 ML | Refills: 2 | Status: SHIPPED | OUTPATIENT
Start: 2024-01-30 | End: 2024-04-29

## 2024-01-30 NOTE — TELEPHONE ENCOUNTER
Pt called [facility] and they had no ETA for availability of nebulizer.  Requesting office to call and put a rush delivery on machine.    Pt stating cough is severe at times/repetitive spasms, can't catch breath, is there something else she can get prescribed.  Pt familiar with how to use standard inhaler.    Please advise.

## 2024-01-30 NOTE — TELEPHONE ENCOUNTER
"Reason for Disposition   Information only question and nurse able to answer    Answer Assessment - Initial Assessment Questions  1. REASON FOR CALL or QUESTION: \"What is your reason for calling today?\" or \"How can I best help you?\" or \"What question do you have that I can help answer?\"      Pt calling to check status of nebulizer    Protocols used: Information Only Call - No Triage-ADULT-[place]    Pt calling to check status of nebulizer. Advised that Dr. Dougherty prescribed albuterol inhaler and also saline solution for when nebulizer comes in. Recommended she contact [facility] again regarding delivery of her nebulizer machine. She will contact us if there is anything we can do to expedite this process.    "

## 2024-01-30 NOTE — TELEPHONE ENCOUNTER
Regarding: cough  ----- Message from Haylie Velazquez MA sent at 1/30/2024  2:36 PM EST -----  Patient called requesting to speak with Sheba. Stated she has a bad cough, no other symptoms. She was also looking for an update on her nebulizer machine. Advised her it was processed through [facility].

## 2024-01-30 NOTE — TELEPHONE ENCOUNTER
Pt calling regarding sodium chloride nebulizer solution. She received a call from her pharmacy that they are unable to fill it because the medication is unavailable. Confirmed that this request was sent to the refill team by the pharmacy.

## 2024-01-31 ENCOUNTER — TELEPHONE (OUTPATIENT)
Age: 79
End: 2024-01-31

## 2024-01-31 ENCOUNTER — APPOINTMENT (OUTPATIENT)
Dept: RADIOLOGY | Facility: MEDICAL CENTER | Age: 79
End: 2024-01-31
Payer: MEDICARE

## 2024-01-31 ENCOUNTER — OFFICE VISIT (OUTPATIENT)
Dept: URGENT CARE | Facility: MEDICAL CENTER | Age: 79
End: 2024-01-31
Payer: MEDICARE

## 2024-01-31 ENCOUNTER — TELEPHONE (OUTPATIENT)
Dept: HEMATOLOGY ONCOLOGY | Facility: CLINIC | Age: 79
End: 2024-01-31

## 2024-01-31 VITALS
SYSTOLIC BLOOD PRESSURE: 140 MMHG | HEIGHT: 62 IN | RESPIRATION RATE: 18 BRPM | WEIGHT: 155 LBS | DIASTOLIC BLOOD PRESSURE: 63 MMHG | TEMPERATURE: 97.5 F | OXYGEN SATURATION: 96 % | BODY MASS INDEX: 28.52 KG/M2 | HEART RATE: 74 BPM

## 2024-01-31 DIAGNOSIS — R05.3 CHRONIC COUGH: ICD-10-CM

## 2024-01-31 DIAGNOSIS — R05.3 CHRONIC COUGH: Primary | ICD-10-CM

## 2024-01-31 DIAGNOSIS — R39.9 UTI SYMPTOMS: ICD-10-CM

## 2024-01-31 PROCEDURE — 71046 X-RAY EXAM CHEST 2 VIEWS: CPT

## 2024-01-31 PROCEDURE — G0463 HOSPITAL OUTPT CLINIC VISIT: HCPCS

## 2024-01-31 PROCEDURE — 99213 OFFICE O/P EST LOW 20 MIN: CPT

## 2024-01-31 RX ORDER — NITROFURANTOIN 25; 75 MG/1; MG/1
100 CAPSULE ORAL 2 TIMES DAILY
Qty: 10 CAPSULE | Refills: 0 | Status: SHIPPED | OUTPATIENT
Start: 2024-01-31 | End: 2024-02-05

## 2024-01-31 RX ORDER — PREDNISONE 20 MG/1
20 TABLET ORAL DAILY
Qty: 5 TABLET | Refills: 0 | Status: SHIPPED | OUTPATIENT
Start: 2024-01-31 | End: 2024-02-05

## 2024-01-31 NOTE — TELEPHONE ENCOUNTER
Patient calling stating her cough is not getting any better. She would like to speak with a nurse.

## 2024-01-31 NOTE — TELEPHONE ENCOUNTER
C/O ongoing cough, SOB, wheezing.  Pt sent to [facility] for evaluation.    Reason for Disposition   Wheezing is present    Answer Assessment - Initial Assessment Questions  1. ONSET:       Mid-December 2023, worsened beginning in January 2024  2. SEVERITY:       Constant, deep bark, exhausting  3. SPUTUM:       Non-productive cough, but feels it in chest  4. HEMOPTYSIS:       Denies  5. DIFFICULTY BREATHING:       SOB with activity, wheezing at times  6. FEVER:       Low grade, but now  7. CARDIAC HISTORY:       CHF, A-Fib  8. LUNG HISTORY:      Denies  9. PE RISK FACTORS:       Denies  10. OTHER SYMPTOMS:        Denies    Protocols used: Cough-ADULT-[place]

## 2024-01-31 NOTE — PROGRESS NOTES
[facility]        NAME: Farnaz Martin is a 78 y.o. female  : 1945    MRN: [id number]  DATE: 2024  TIME: 3:01 PM    Assessment and Plan   Chronic cough [R05.3]  1. Chronic cough  XR chest pa & lateral    predniSONE 20 mg tablet        CXR reviewed, no acute cardiopulmonary disease identified, pending final read per radiology.     Patient Instructions     Please take Prednisone as ordered.   Continue neb treatments as ordered by PCP.   Follow up with PCP in 3-5 days.  Proceed to  ER if symptoms worsen.    Chief Complaint     Chief Complaint   Patient presents with    Cough     Pt. With a cough for the past year. Was referred to Pulmonary. She reports that the cough has worsened in the past 3 months. She spoke with the pulmonologist office this am who referred her to the urgent care.          History of Present Illness       78-year-old female presents for evaluation of chronic cough and nasal congestion.  Patient states that she has been experiencing a nonproductive cough over the past year and a half.  She states that over the past 3 months the cough is worsening.  She notes that she has been having shortness of breath and low-grade fevers over the past weeks.She has been evaluated by both her PCP and a pulmonologist. She denies any current treatment for her cough. She notified her pulmonologist and they recommended she present here for a chest xray.         Cough  Associated symptoms include a fever and shortness of breath (exertional). Pertinent negatives include no chest pain, chills or sore throat.       Review of Systems   Review of Systems   Constitutional:  Positive for fever. Negative for chills.   HENT:  Positive for congestion. Negative for sore throat.    Respiratory:  Positive for cough and shortness of breath (exertional).    Cardiovascular:  Negative for chest pain.   Gastrointestinal:  Negative for diarrhea, nausea and vomiting.   All other systems reviewed and are  negative.        Current Medications       Current Outpatient Medications:     albuterol (ProAir HFA) 90 mcg/act inhaler, Inhale 2 puffs every 6 (six) hours as needed for wheezing, Disp: 8.5 g, Rfl: 3    apixaban (ELIQUIS) 5 mg, Take 1 tablet (5 mg total) by mouth 2 (two) times a day, Disp: 60 tablet, Rfl: 3    ascorbic acid (VITAMIN C) 500 mg tablet, Take 500 mg by mouth daily , Disp: , Rfl:     Cetirizine HCl (ZyrTEC Allergy) 10 MG CAPS, Take 10 mg by mouth in the morning, Disp: , Rfl:     Cholecalciferol 50 MCG (2000 UT) CAPS, Take 2,000 Units by mouth 2 (two) times a day, Disp: , Rfl:     Chromium Picolinate (CHROMIUM PICOLATE PO), Take 1 tablet by mouth daily, Disp: , Rfl:     diltiazem (CARDIZEM CD) 120 mg 24 hr capsule, Take 1 capsule (120 mg total) by mouth 2 (two) times a day You may take 1 additional capsule daily (120 mg) if you have palpitations and elevated heart rate consistent with your atrial flutter/fibrillation. Max 360 mg daily., Disp: 90 capsule, Rfl: 0    DULoxetine (CYMBALTA) 30 mg delayed release capsule, take 1 capsule by mouth once daily, Disp: 30 capsule, Rfl: 2    ezetimibe (ZETIA) 10 mg tablet, take 1 tablet by mouth once daily, Disp: 90 tablet, Rfl: 3    flecainide (TAMBOCOR) 100 mg tablet, Take 1 tablet (100 mg total) by mouth 2 (two) times a day, Disp: [id number] tablet, Rfl: 3    furosemide (LASIX) 20 mg tablet, Take 1 tablet (20 mg total) by mouth daily take 2 tablet by mouth daily if needed for shortness of breath WEIGHT GAIN 3 LBS IN 1 DAY OR LOWER LEG SWELLING, Disp: 90 tablet, Rfl: 3    gabapentin (NEURONTIN) 300 mg capsule, Take 1 capsule (300 mg total) by mouth daily at bedtime, Disp: 90 capsule, Rfl: 1    losartan (COZAAR) 50 mg tablet, take 1 tablet by mouth twice a day, Disp: 180 tablet, Rfl: 3    metoprolol succinate (TOPROL-XL) 100 mg 24 hr tablet, Take 1 tablet (100 mg total) by mouth every 12 (twelve) hours, Disp: , Rfl:     nitrofurantoin (MACROBID) 100 mg capsule, Take 1  capsule (100 mg total) by mouth 2 (two) times a day for 5 days, Disp: 10 capsule, Rfl: 0    predniSONE 20 mg tablet, Take 1 tablet (20 mg total) by mouth daily for 5 days, Disp: 5 tablet, Rfl: 0    rOPINIRole (REQUIP) 1 mg tablet, Take 2 tablets (2 mg total) by mouth daily at bedtime, Disp: 180 tablet, Rfl: 1    Saccharomyces boulardii (Probiotic) 250 MG CAPS, Take 1 capsule by mouth daily, Disp: , Rfl:     TURMERIC PO, Take 1 capsule by mouth 2 (two) times a day, Disp: , Rfl:     zolpidem (AMBIEN) 10 mg tablet, Take 1 tablet (10 mg total) by mouth daily at bedtime as needed for sleep, Disp: 90 tablet, Rfl: 1    acetylcysteine (MUCOMYST) 10 % nebulizer solution, Take 4 mL by nebulization every 4 (four) hours, Disp: 90 mL, Rfl: 2    albuterol (2.5 mg/3 mL) 0.083 % nebulizer solution, Take 3 mL (2.5 mg total) by nebulization every 6 (six) hours as needed for wheezing or shortness of breath, Disp: 1080 mL, Rfl: 0    ipratropium (ATROVENT) 0.03 % nasal spray, 2 sprays into each nostril 3 (three) times a day Begin once per day, then progress to twice per day. Progress to three times a day as tolerated. (Patient not taking: Reported on 1/31/2024), Disp: 90 mL, Rfl: 3    sodium chloride 3 % inhalation solution, Take 4 mL by nebulization 2 (two) times a day (Patient not taking: Reported on 1/31/2024), Disp: 240 mL, Rfl: 2    Current Allergies     Allergies as of 01/31/2024 - Reviewed 01/31/2024   Allergen Reaction Noted    Sotalol Other (See Comments) 04/14/2021    Penicillins Other (See Comments) 04/19/2016    Ace inhibitors GI Intolerance 04/19/2016    Omeprazole Abdominal Pain, Rash, and Vomiting 08/05/2022            The following portions of the patient's history were reviewed and updated as appropriate: allergies, current medications, past family history, past medical history, past social history, past surgical history and problem list.     Past Medical History:   Diagnosis Date    Actinic keratosis     last assessed  - [id number]    Arthritis     Atrial fibrillation with rapid ventricular response (HCC)     last assessed - [id number]    Basal cell carcinoma     Benign colon polyp     last assessed - [id number]    CHF (congestive heart failure) ([facility]) 06/2022    Disease of thyroid gland     Effusion of knee joint right     Resolved - [id number]    Esophageal reflux     Fibromyalgia     last assessed - [id number]    Fibromyalgia, primary     GERD (gastroesophageal reflux disease)     Hypertension     Palpitations     last assessed - [id number]    Peroneal tendonitis, right     last assessed - [id number]    Right lumbar radiculopathy 03/17/2016    Thyroid cancer (HCC)     Thyroid nodule     Trochanteric bursitis of left hip 03/09/2018       Past Surgical History:   Procedure Laterality Date    CARDIAC ELECTROPHYSIOLOGY STUDY AND ABLATION  08/2022    CATARACT EXTRACTION Bilateral     COLONOSCOPY  03/2018    COLONOSCOPY W/ POLYPECTOMY  2021    repeat in 5 years    EYE SURGERY      HYSTERECTOMY      JOINT REPLACEMENT Left     knee    OK NEUROPLASTY &/TRANSPOS MEDIAN NRV CARPAL TUNNE Right 11/14/2019    Procedure: RELEASE CARPAL TUNNEL;  Surgeon: Onesimo Tate MD;  Location: [facility];  Service: Orthopedics    OK TOTAL THYROID LOBECTOMY UNI W/WO ISTHMUSECTOMY Left 12/16/2020    Procedure: Left THYROID LOBECTOMY;  Surgeon: Jhony Bhatt MD;  Location: [facility];  Service: ENT    RECTAL POLYPECTOMY      REPLACEMENT TOTAL KNEE Left     last assessed - [id number]    TONSILLECTOMY      TOTAL ABDOMINAL HYSTERECTOMY      TUBAL LIGATION      US GUIDED THYROID BIOPSY  10/14/2020       Family History   Problem Relation Age of Onset    Heart disease Mother     Diabetes Mother     Heart disease Father     Coronary artery disease Father     Stroke Father         cerebrovascular accident    Heart attack Father         myocardial infarction    Sudden death Father         scd    Other Family         Back disorder    Coronary artery disease Family      "Neuropathy Family     Osteoporosis Family     No Known Problems Daughter     No Known Problems Maternal Grandmother     No Known Problems Maternal Grandfather     No Known Problems Paternal Grandmother     No Known Problems Paternal Grandfather     Cancer Maternal Uncle     Breast cancer Maternal Aunt 65    No Known Problems Son     No Known Problems Maternal Aunt     No Known Problems Maternal Aunt     No Known Problems Maternal Aunt     No Known Problems Paternal Aunt     No Known Problems Paternal Aunt     Anuerysm Neg Hx     Clotting disorder Neg Hx     Arrhythmia Neg Hx     Heart failure Neg Hx          Medications have been verified.        Objective   /63   Pulse 74   Temp 97.5 °F (36.4 °C)   Resp 18   Ht 5' 1.5\" (1.562 m)   Wt 70.3 kg (155 lb)   LMP 02/01/1990 (Within Weeks)   SpO2 96%   BMI 28.81 kg/m²        Physical Exam     Physical Exam  Vitals and nursing note reviewed.   Constitutional:       General: She is not in acute distress.     Appearance: Normal appearance. She is normal weight. She is not ill-appearing, toxic-appearing or diaphoretic.   HENT:      Head: Normocephalic and atraumatic.      Right Ear: Tympanic membrane normal.      Left Ear: Tympanic membrane normal.      Nose: Congestion present. No rhinorrhea.      Mouth/Throat:      Mouth: Mucous membranes are moist.      Pharynx: Oropharynx is clear. No oropharyngeal exudate or posterior oropharyngeal erythema.   Eyes:      General:         Right eye: No discharge.         Left eye: No discharge.   Cardiovascular:      Rate and Rhythm: Normal rate and regular rhythm.      Pulses: Normal pulses.      Heart sounds: Normal heart sounds. No murmur heard.     No friction rub. No gallop.   Pulmonary:      Effort: Pulmonary effort is normal. No respiratory distress.      Breath sounds: No stridor. Wheezing and rhonchi present. No rales.   Chest:      Chest wall: No tenderness.   Abdominal:      General: Bowel sounds are normal.      " Palpations: Abdomen is soft.      Tenderness: There is no abdominal tenderness.   Skin:     General: Skin is warm and dry.   Neurological:      Mental Status: She is alert.   Psychiatric:         Mood and Affect: Mood normal.         Behavior: Behavior normal.

## 2024-01-31 NOTE — TELEPHONE ENCOUNTER
Instructed on Nebulizer machine and meds.  Advised to talk to pharmacist about alternative saline solution since he was out of, advised to ask about OTC saline solution that is available.  Pt acknowledged instructions and agreed.  No further questions at this time.

## 2024-01-31 NOTE — TELEPHONE ENCOUNTER
Pt called at [facility].  Misunderstood she is to be assessed at UC for wheezing and SOB not just get CXR.

## 2024-01-31 NOTE — TELEPHONE ENCOUNTER
"Patient called rx refill line and stated that the pharmacy cannot \"get\" the sodium chloride 3% inhalation solution. Patient states she needs something else that she can use for her nebulizer. Patient would like a phone call from the office.   "

## 2024-01-31 NOTE — TELEPHONE ENCOUNTER
Appointment Change  Cancel, Reschedule, Change to Virtual      Who are you speaking with? Patient   If it is not the patient, is the caller listed on the communication consent form? Yes   Which provider is the appointment scheduled with? Thu Spencer PA-C   When was the original appointment scheduled?    Please list date and time 2/1 10:30am   At which location is the appointment scheduled to take place? [facility]   Was the appointment rescheduled?     Was the appointment changed from an in person visit to a virtual visit?    If so, please list the details of the change. No, will call   What is the reason for the appointment change? Not feel well       Was STAR transport scheduled? No   Does STAR transport need to be scheduled for the new visit (if applicable) No   Does the patient need an infusion appointment rescheduled? No   Does the patient have an upcoming infusion appointment scheduled? If so, when? No   Is the patient undergoing chemotherapy? No   For appointments cancelled with less than 24 hours:  Was the no-show policy reviewed? Yes

## 2024-02-02 ENCOUNTER — TELEPHONE (OUTPATIENT)
Age: 79
End: 2024-02-02

## 2024-02-02 NOTE — TELEPHONE ENCOUNTER
Incoming call:    Nebulizer question.    All questions answered. No further needs at the moment.

## 2024-02-05 NOTE — TELEPHONE ENCOUNTER
If the nebulizer is not helping, she should come back for another visit. It looks like she has some testing scheduled so can wait until after the testing. I would have her continue with the nebulizer in the meantime

## 2024-02-05 NOTE — TELEPHONE ENCOUNTER
Pt calls back and is wondering if it is safe to get the RSV vaccine or if she should hold off on it? She is wondering if her symptoms are coming form RSV but she doesn't think it is likely. Pt requesting call back

## 2024-02-05 NOTE — TELEPHONE ENCOUNTER
Farnaz called and wants to know how much longer would she have to use the Nebulizer to clear her cough . Please advise [phone]

## 2024-02-08 ENCOUNTER — HOSPITAL ENCOUNTER (OUTPATIENT)
Dept: PULMONOLOGY | Facility: HOSPITAL | Age: 79
End: 2024-02-08
Attending: INTERNAL MEDICINE
Payer: MEDICARE

## 2024-02-08 DIAGNOSIS — R05.3 CHRONIC COUGH: ICD-10-CM

## 2024-02-08 DIAGNOSIS — J47.9 BRONCHIECTASIS WITHOUT COMPLICATION (HCC): ICD-10-CM

## 2024-02-08 PROCEDURE — 94726 PLETHYSMOGRAPHY LUNG VOLUMES: CPT | Performed by: INTERNAL MEDICINE

## 2024-02-08 PROCEDURE — 94060 EVALUATION OF WHEEZING: CPT

## 2024-02-08 PROCEDURE — 94729 DIFFUSING CAPACITY: CPT | Performed by: INTERNAL MEDICINE

## 2024-02-08 PROCEDURE — 94729 DIFFUSING CAPACITY: CPT

## 2024-02-08 PROCEDURE — 94760 N-INVAS EAR/PLS OXIMETRY 1: CPT

## 2024-02-08 PROCEDURE — 94726 PLETHYSMOGRAPHY LUNG VOLUMES: CPT

## 2024-02-08 PROCEDURE — 94060 EVALUATION OF WHEEZING: CPT | Performed by: INTERNAL MEDICINE

## 2024-02-08 RX ORDER — ALBUTEROL SULFATE 2.5 MG/3ML
2.5 SOLUTION RESPIRATORY (INHALATION) ONCE
Status: COMPLETED | OUTPATIENT
Start: 2024-02-08 | End: 2024-02-08

## 2024-02-08 RX ADMIN — ALBUTEROL SULFATE 2.5 MG: 2.5 SOLUTION RESPIRATORY (INHALATION) at 14:05

## 2024-02-12 DIAGNOSIS — R05.3 CHRONIC COUGH: ICD-10-CM

## 2024-02-12 DIAGNOSIS — J47.9 BRONCHIECTASIS WITHOUT COMPLICATION (HCC): ICD-10-CM

## 2024-02-12 RX ORDER — SODIUM CHLORIDE FOR INHALATION 3 %
VIAL, NEBULIZER (ML) INHALATION
Qty: 240 ML | Refills: 2 | Status: SHIPPED | OUTPATIENT
Start: 2024-02-12 | End: 2024-02-15

## 2024-02-13 ENCOUNTER — NURSE TRIAGE (OUTPATIENT)
Age: 79
End: 2024-02-13

## 2024-02-13 NOTE — TELEPHONE ENCOUNTER
Per AP we can add to scheduled for tomorrow, 2/14/2024, in [place]. Please assist as there is an AM slot only.

## 2024-02-13 NOTE — TELEPHONE ENCOUNTER
Regarding: NP-many urinary issues  ----- Message from Shanna Hickey sent at 2/13/2024 11:19 AM EST -----  NP called and asking for soonest appt available due to having symptoms for over a month of difficulty urinating, burning, pain in stomach, not emptying fully, leakage, cloudy urine and not able to start urinating for a while. Pt PCP did give her 2 rounds of macrobid with no relief. Pt is willing to be seen at either [place] or [place]    PT call [phone]

## 2024-02-14 ENCOUNTER — OFFICE VISIT (OUTPATIENT)
Dept: UROLOGY | Facility: AMBULATORY SURGERY CENTER | Age: 79
End: 2024-02-14
Payer: MEDICARE

## 2024-02-14 VITALS
RESPIRATION RATE: 18 BRPM | BODY MASS INDEX: 28.52 KG/M2 | HEIGHT: 62 IN | WEIGHT: 155 LBS | SYSTOLIC BLOOD PRESSURE: 132 MMHG | DIASTOLIC BLOOD PRESSURE: 80 MMHG

## 2024-02-14 DIAGNOSIS — R35.0 FREQUENT URINATION: Primary | ICD-10-CM

## 2024-02-14 LAB — POST-VOID RESIDUAL VOLUME, ML POC: 31 ML

## 2024-02-14 PROCEDURE — 99203 OFFICE O/P NEW LOW 30 MIN: CPT

## 2024-02-14 PROCEDURE — 51798 US URINE CAPACITY MEASURE: CPT

## 2024-02-14 NOTE — PROGRESS NOTES
Office Visit- Urology  Farnaz Martin 1945 MRN: [id number]      Assessment/Discussion/Plan    78 y.o. female managed by     Lower urinary tract symptoms  -Patient treated with antibiotic (2 courses of Macrobid) per PCP with no change in urinary symptoms.  Patient does endorse concomitant dysuria dysuria have since resolved  -PVR today is 31 mL  -Suspect that it could possibly be indicative of overactive bladder  -Obtain ultrasound of the kidneys and bladder obtain ultrasound of the kidneys and bladder as patient has not had abdominal imaging evaluating her urinary tract  -Will obtain repeat urine testing.  If positive urinary tract infection we will try the antibiotic per susceptibility report if negative we will consider treatment with beta 3 agonist.  Discussed Myrbetriq in depth today including administration possible side effects including dry eyes, dry mouth, constipation, rasie in  blood pressure, palpitations  -Will have plan for follow-up in 3 months.  Call patient with results of urine testing        Chief Complaint:   Farnaz is a 78 y.o. female presenting to the office for initial evaluation of low urinary tract symptoms        Subjective    Patient is a 78-year-old female with no significant past urologic history who presents to the office today for evaluation of lower tract symptoms.  She states that on Sunday she was experiencing frequency, urgency, suprapubic pain, and dysuria.  Symptoms have since improved slightly with most improvement in dysuria.  She states that her PCP previously prescribed 2 courses of Macrobid for urinary tract symptoms before but this did not help with her urinary symptoms.  Urine culture was not obtained.  Patient last had a positive urine culture in September 2023 with growth of E. coli which at that time was susceptible to nitrofurantoin she denies any occurrence of gross hematuria.  She states that sometimes she feels that she is not completely able to empty her  bladder.  PVR today is 31 mL.  She does have a history of a hysterectomy but routinely follows with gynecology and there is been no evidence of pelvic or prolapse on examination.      ROS:   Review of Systems   Constitutional: Negative.  Negative for chills, fatigue and fever.   HENT: Negative.     Respiratory:  Negative for shortness of breath.    Cardiovascular:  Negative for chest pain.   Gastrointestinal: Negative.  Negative for abdominal pain.   Endocrine: Negative.    Musculoskeletal: Negative.    Skin: Negative.    Neurological: Negative.  Negative for dizziness and light-headedness.   Hematological: Negative.    Psychiatric/Behavioral: Negative.           Past Medical History  Past Medical History:   Diagnosis Date    Actinic keratosis     last assessed - 06Jun2014    Arthritis     Atrial fibrillation with rapid ventricular response (HCC)     last assessed - 26Apr2016    Basal cell carcinoma     Benign colon polyp     last assessed - 27Apr2015    CHF (congestive heart failure) ([facility]) 06/2022    Disease of thyroid gland     Effusion of knee joint right     Resolved - 19Apr2016    Esophageal reflux     Fibromyalgia     last assessed - [id number]    Fibromyalgia, primary     GERD (gastroesophageal reflux disease)     Hypertension     Palpitations     last assessed - 30Apr2013    Peroneal tendonitis, right     last assessed - [id number]    Right lumbar radiculopathy 03/17/2016    Thyroid cancer ([facility])     Thyroid nodule     Trochanteric bursitis of left hip 03/09/2018       Past Surgical History  Past Surgical History:   Procedure Laterality Date    CARDIAC ELECTROPHYSIOLOGY STUDY AND ABLATION  08/2022    CATARACT EXTRACTION Bilateral     COLONOSCOPY  03/2018    COLONOSCOPY W/ POLYPECTOMY  2021    repeat in 5 years    EYE SURGERY      HYSTERECTOMY      JOINT REPLACEMENT Left     knee    SC NEUROPLASTY &/TRANSPOS MEDIAN NRV CARPAL TUNNE Right 11/14/2019    Procedure: RELEASE CARPAL TUNNEL;  Surgeon: Onesimo Tate  MD;  Location: [facility];  Service: Orthopedics    RI TOTAL THYROID LOBECTOMY UNI W/WO ISTHMUSECTOMY Left 12/16/2020    Procedure: Left THYROID LOBECTOMY;  Surgeon: Jhony Bhatt MD;  Location: [facility];  Service: ENT    RECTAL POLYPECTOMY      REPLACEMENT TOTAL KNEE Left     last assessed - 27Apr2015    TONSILLECTOMY      TOTAL ABDOMINAL HYSTERECTOMY      TUBAL LIGATION      US GUIDED THYROID BIOPSY  10/14/2020       Past Family History  Family History   Problem Relation Age of Onset    Heart disease Mother     Diabetes Mother     Heart disease Father     Coronary artery disease Father     Stroke Father         cerebrovascular accident    Heart attack Father         myocardial infarction    Sudden death Father         scd    Other Family         Back disorder    Coronary artery disease Family     Neuropathy Family     Osteoporosis Family     No Known Problems Daughter     No Known Problems Maternal Grandmother     No Known Problems Maternal Grandfather     No Known Problems Paternal Grandmother     No Known Problems Paternal Grandfather     Cancer Maternal Uncle     Breast cancer Maternal Aunt 65    No Known Problems Son     No Known Problems Maternal Aunt     No Known Problems Maternal Aunt     No Known Problems Maternal Aunt     No Known Problems Paternal Aunt     No Known Problems Paternal Aunt     Anuerysm Neg Hx     Clotting disorder Neg Hx     Arrhythmia Neg Hx     Heart failure Neg Hx        Past Social history  Social History     Socioeconomic History    Marital status:      Spouse name: Not on file    Number of children: Not on file    Years of education: Not on file    Highest education level: Not on file   Occupational History    Not on file   Tobacco Use    Smoking status: Never    Smokeless tobacco: Never   Vaping Use    Vaping status: Never Used   Substance and Sexual Activity    Alcohol use: Not Currently    Drug use: Never    Sexual activity: Not Currently   Other Topics Concern    Not on  file   Social History Narrative    Not on file     Social Determinants of Health     Financial Resource Strain: Patient Unable To Answer (12/19/2023)    Overall Financial Resource Strain (CARDIA)     Difficulty of Paying Living Expenses: Patient unable to answer   Food Insecurity: No Food Insecurity (1/5/2024)    Hunger Vital Sign     Worried About Running Out of Food in the Last Year: Never true     Ran Out of Food in the Last Year: Never true   Transportation Needs: No Transportation Needs (1/5/2024)    PRAPARE - Transportation     Lack of Transportation (Medical): No     Lack of Transportation (Non-Medical): No   Physical Activity: Not on file   Stress: Not on file   Social Connections: Not on file   Intimate Partner Violence: Not At Risk (7/31/2023)    Received from [facility]    Humiliation, Afraid, Rape, and Kick questionnaire     Fear of Current or Ex-Partner: No     Emotionally Abused: No     Physically Abused: No     Sexually Abused: No   Housing Stability: Low Risk  (1/5/2024)    Housing Stability Vital Sign     Unable to Pay for Housing in the Last Year: No     Number of Places Lived in the Last Year: 1     Unstable Housing in the Last Year: No       Current Medications  Current Outpatient Medications   Medication Sig Dispense Refill    acetylcysteine (MUCOMYST) 10 % nebulizer solution Take 4 mL by nebulization every 4 (four) hours 90 mL 2    albuterol (2.5 mg/3 mL) 0.083 % nebulizer solution Take 3 mL (2.5 mg total) by nebulization every 6 (six) hours as needed for wheezing or shortness of breath 1080 mL 0    albuterol (ProAir HFA) 90 mcg/act inhaler Inhale 2 puffs every 6 (six) hours as needed for wheezing 8.5 g 3    apixaban (ELIQUIS) 5 mg Take 1 tablet (5 mg total) by mouth 2 (two) times a day 60 tablet 3    ascorbic acid (VITAMIN C) 500 mg tablet Take 500 mg by mouth daily       Cetirizine HCl (ZyrTEC Allergy) 10 MG CAPS Take 10 mg by mouth in the morning      Cholecalciferol 50 MCG  (2000 UT) CAPS Take 2,000 Units by mouth 2 (two) times a day      Chromium Picolinate (CHROMIUM PICOLATE PO) Take 1 tablet by mouth daily      diltiazem (CARDIZEM CD) 120 mg 24 hr capsule Take 1 capsule (120 mg total) by mouth 2 (two) times a day You may take 1 additional capsule daily (120 mg) if you have palpitations and elevated heart rate consistent with your atrial flutter/fibrillation. Max 360 mg daily. 90 capsule 0    DULoxetine (CYMBALTA) 30 mg delayed release capsule take 1 capsule by mouth once daily 30 capsule 2    ezetimibe (ZETIA) 10 mg tablet take 1 tablet by mouth once daily 90 tablet 3    flecainide (TAMBOCOR) 100 mg tablet Take 1 tablet (100 mg total) by mouth 2 (two) times a day 22958 tablet 3    furosemide (LASIX) 20 mg tablet Take 1 tablet (20 mg total) by mouth daily take 2 tablet by mouth daily if needed for shortness of breath WEIGHT GAIN 3 LBS IN 1 DAY OR LOWER LEG SWELLING 90 tablet 3    gabapentin (NEURONTIN) 300 mg capsule Take 1 capsule (300 mg total) by mouth daily at bedtime 90 capsule 1    losartan (COZAAR) 50 mg tablet take 1 tablet by mouth twice a day 180 tablet 3    metoprolol succinate (TOPROL-XL) 100 mg 24 hr tablet Take 1 tablet (100 mg total) by mouth every 12 (twelve) hours      Saccharomyces boulardii (Probiotic) 250 MG CAPS Take 1 capsule by mouth daily      sodium chloride 3 % inhalation solution USE 4 MILLILITERS IN NEBULIZER TWICE A  mL 2    TURMERIC PO Take 1 capsule by mouth 2 (two) times a day      zolpidem (AMBIEN) 10 mg tablet Take 1 tablet (10 mg total) by mouth daily at bedtime as needed for sleep 90 tablet 1    ipratropium (ATROVENT) 0.03 % nasal spray 2 sprays into each nostril 3 (three) times a day Begin once per day, then progress to twice per day. Progress to three times a day as tolerated. (Patient not taking: Reported on 1/31/2024) 90 mL 3    rOPINIRole (REQUIP) 1 mg tablet Take 2 tablets (2 mg total) by mouth daily at bedtime 180 tablet 1     No  "current facility-administered medications for this visit.       Allergies  Allergies   Allergen Reactions    Sotalol Other (See Comments)     Prolonged QTc leading to torsades de pointes     Penicillins Other (See Comments)     As a child calcium deposit in the arm     Ace Inhibitors GI Intolerance     Did feel well on it    Omeprazole Abdominal Pain, Rash and Vomiting     stomach pain, vomiting, rash       OBJECTIVE    Vitals   Vitals:    02/14/24 1349   BP: 132/80   BP Location: Left arm   Patient Position: Sitting   Cuff Size: Standard   Resp: 18   Weight: 70.3 kg (155 lb)   Height: 5' 1.5\" (1.562 m)       PVR:    Physical Exam  Constitutional:       General: She is not in acute distress.     Appearance: Normal appearance. She is normal weight. She is not ill-appearing or toxic-appearing.   HENT:      Head: Normocephalic and atraumatic.   Eyes:      Conjunctiva/sclera: Conjunctivae normal.   Cardiovascular:      Rate and Rhythm: Normal rate.      Pulses: Normal pulses.   Pulmonary:      Effort: Pulmonary effort is normal. No respiratory distress.   Abdominal:      Tenderness: There is no right CVA tenderness or left CVA tenderness.   Musculoskeletal:         General: Normal range of motion.      Cervical back: Normal range of motion and neck supple.   Skin:     General: Skin is warm and dry.   Neurological:      General: No focal deficit present.      Mental Status: She is alert and oriented to person, place, and time.      Cranial Nerves: No cranial nerve deficit.   Psychiatric:         Mood and Affect: Mood normal.         Behavior: Behavior normal.         Thought Content: Thought content normal.          Labs:     Lab Results   Component Value Date    CREATININE 0.77 01/22/2024      Lab Results   Component Value Date    HGBA1C 7.6 (H) 01/09/2024     Lab Results   Component Value Date    GLUCOSE 114 05/06/2015    CALCIUM 9.8 01/22/2024     05/06/2015    K 4.2 01/22/2024    CO2 29 01/22/2024    CL 95 (L) " 01/22/2024    BUN 18 01/22/2024    CREATININE 0.77 01/22/2024       I have personally reviewed all pertinent lab results and reviewed with patient    Imaging       Navdeep Christianson PA-C  Date: 2/14/2024 Time: 2:00 PM  [facility]    This note was written using fluency dictation software. Please excuse any resulting minor grammatical errors.

## 2024-02-15 ENCOUNTER — APPOINTMENT (OUTPATIENT)
Dept: LAB | Facility: MEDICAL CENTER | Age: 79
End: 2024-02-15
Payer: MEDICARE

## 2024-02-15 DIAGNOSIS — J47.9 BRONCHIECTASIS WITHOUT COMPLICATION (HCC): ICD-10-CM

## 2024-02-15 DIAGNOSIS — R35.0 FREQUENT URINATION: ICD-10-CM

## 2024-02-15 DIAGNOSIS — R05.3 CHRONIC COUGH: ICD-10-CM

## 2024-02-15 DIAGNOSIS — J21.9 BRONCHIOLITIS: ICD-10-CM

## 2024-02-15 LAB
BACTERIA UR QL AUTO: ABNORMAL /HPF
BILIRUB UR QL STRIP: NEGATIVE
CLARITY UR: ABNORMAL
COLOR UR: YELLOW
GLUCOSE UR STRIP-MCNC: NEGATIVE MG/DL
HGB UR QL STRIP.AUTO: NEGATIVE
KETONES UR STRIP-MCNC: NEGATIVE MG/DL
LEUKOCYTE ESTERASE UR QL STRIP: ABNORMAL
MUCOUS THREADS UR QL AUTO: ABNORMAL
NITRITE UR QL STRIP: NEGATIVE
NON-SQ EPI CELLS URNS QL MICRO: ABNORMAL /HPF
PH UR STRIP.AUTO: 8.5 [PH]
PROT UR STRIP-MCNC: ABNORMAL MG/DL
RBC #/AREA URNS AUTO: ABNORMAL /HPF
SP GR UR STRIP.AUTO: 1.02 (ref 1–1.03)
UROBILINOGEN UR STRIP-ACNC: <2 MG/DL
WBC #/AREA URNS AUTO: ABNORMAL /HPF

## 2024-02-15 PROCEDURE — 87206 SMEAR FLUORESCENT/ACID STAI: CPT

## 2024-02-15 PROCEDURE — 87205 SMEAR GRAM STAIN: CPT

## 2024-02-15 PROCEDURE — 87077 CULTURE AEROBIC IDENTIFY: CPT

## 2024-02-15 PROCEDURE — 87186 SC STD MICRODIL/AGAR DIL: CPT

## 2024-02-15 PROCEDURE — 87070 CULTURE OTHR SPECIMN AEROBIC: CPT

## 2024-02-15 PROCEDURE — 81001 URINALYSIS AUTO W/SCOPE: CPT

## 2024-02-15 PROCEDURE — 87086 URINE CULTURE/COLONY COUNT: CPT

## 2024-02-15 PROCEDURE — 87116 MYCOBACTERIA CULTURE: CPT

## 2024-02-15 RX ORDER — SODIUM CHLORIDE FOR INHALATION 3 %
VIAL, NEBULIZER (ML) INHALATION
Qty: 240 ML | Refills: 2 | Status: SHIPPED | OUTPATIENT
Start: 2024-02-15

## 2024-02-16 ENCOUNTER — NURSE TRIAGE (OUTPATIENT)
Age: 79
End: 2024-02-16

## 2024-02-16 LAB — RHODAMINE-AURAMINE STN SPEC: NORMAL

## 2024-02-16 NOTE — TELEPHONE ENCOUNTER
Pt of Navdeep in [place]. Last seen 02/14/24 for frequent urination       Pt called looking for her urine results. Reports saw them on mychart and not sure if she needs treatment. Please review and advise  Reason for Disposition  • Nursing judgment    Protocols used: Information Only Call - No Triage-ADULT-[place]

## 2024-02-17 DIAGNOSIS — G62.9 PERIPHERAL POLYNEUROPATHY: ICD-10-CM

## 2024-02-17 LAB — BACTERIA UR CULT: ABNORMAL

## 2024-02-18 LAB
BACTERIA SPT RESP CULT: ABNORMAL
BACTERIA SPT RESP CULT: ABNORMAL
GRAM STN SPEC: ABNORMAL

## 2024-02-18 RX ORDER — GABAPENTIN 300 MG/1
300 CAPSULE ORAL
Qty: 90 CAPSULE | Refills: 1 | Status: SHIPPED | OUTPATIENT
Start: 2024-02-18

## 2024-02-19 ENCOUNTER — TELEPHONE (OUTPATIENT)
Age: 79
End: 2024-02-19

## 2024-02-19 DIAGNOSIS — N39.0 URINARY TRACT INFECTION WITHOUT HEMATURIA, SITE UNSPECIFIED: Primary | ICD-10-CM

## 2024-02-19 LAB
BACTERIA SPT RESP CULT: ABNORMAL
BACTERIA SPT RESP CULT: ABNORMAL
GRAM STN SPEC: ABNORMAL

## 2024-02-19 RX ORDER — CEPHALEXIN 500 MG/1
500 CAPSULE ORAL EVERY 12 HOURS SCHEDULED
Qty: 14 CAPSULE | Refills: 0 | Status: SHIPPED | OUTPATIENT
Start: 2024-02-19 | End: 2024-02-26

## 2024-02-19 NOTE — TELEPHONE ENCOUNTER
Contains abnormal data Urine culture  Order: [id number]  Status: Final result       Visible to patient: Yes (seen)       Next appt: 02/23/2024 at 10:45 AM in Radiology ([facility])       Dx: Frequent urination    Specimen Information: Urine   0 Result Notes  Urine Culture >100,000 cfu/ml Proteus mirabilis Abnormal            Susceptibility     Proteus mirabilis     MELLY     Ampicillin ($$) Susceptible     Aztreonam ($$$) Susceptible     Cefazolin ($) Susceptible     Ciprofloxacin ($) Susceptible     Gentamicin ($$) Susceptible     Levofloxacin ($) Susceptible     Nitrofurantoin Resistant     Tetracycline Resistant     Tobramycin ($) Susceptible     Trimethoprim + Sulfamethoxazole ($$$) Susceptible                  Specimen Collected: 02/15/24 10:15           Client reports still having pain in stomach and burning pain. Please review results and advise.  Thank you.

## 2024-02-19 NOTE — TELEPHONE ENCOUNTER
Client's daughter called in regarding results review, states they are checking to see what results are, very concerned as client still symptomatic.  I let her know that the following antibiotic was sent to the pharmacy:      cephalexin (KEFLEX) 500 mg capsule [[id number]]    Order Details  Dose: 500 mg Route: Oral Frequency: Every 12 hours scheduled   Dispense Quantity: 14 capsule Refills: 0          Sig: Take 1 capsule (500 mg total) by mouth every 12 (twelve) hours for 7 days         Start Date: 02/19/24 End Date: 02/26/24 after 14 doses   Written Date: 02/19/24 Expiration Date: 02/18/25   Client's daughter verbalized understanding without any further questions or concerns.

## 2024-02-19 NOTE — TELEPHONE ENCOUNTER
Has been using nebulizer and cough is much better  No fevers or chills.    Sputum sample growing pseudomonas fluorescens which is typically a non-pathogenic colonizer and since pt has minimal infectious symptoms, will not treat for now. Pt agreed with plan

## 2024-02-20 LAB
MYCOBACTERIUM SPEC CULT: NORMAL
RHODAMINE-AURAMINE STN SPEC: NORMAL

## 2024-02-23 ENCOUNTER — HOSPITAL ENCOUNTER (OUTPATIENT)
Dept: RADIOLOGY | Facility: HOSPITAL | Age: 79
Discharge: HOME | End: 2024-02-23
Attending: INTERNAL MEDICINE
Payer: MEDICARE

## 2024-02-23 DIAGNOSIS — R05.3 CHRONIC COUGH: ICD-10-CM

## 2024-02-23 PROCEDURE — 74230 X-RAY XM SWLNG FUNCJ C+: CPT

## 2024-02-23 PROCEDURE — 92611 MOTION FLUOROSCOPY/SWALLOW: CPT

## 2024-02-23 NOTE — PROCEDURES
Video Swallow Study      Patient Name: Farnaz Martin  Today's Date: 2/23/2024        Past Medical History  Past Medical History:   Diagnosis Date    Actinic keratosis     last assessed - 06Jun2014    Arthritis     Atrial fibrillation with rapid ventricular response (HCC)     last assessed - 26Apr2016    Basal cell carcinoma     Benign colon polyp     last assessed - 27Apr2015    CHF (congestive heart failure) (HCC) 06/2022    Disease of thyroid gland     Effusion of knee joint right     Resolved - 19Apr2016    Esophageal reflux     Fibromyalgia     last assessed - [id number]    Fibromyalgia, primary     GERD (gastroesophageal reflux disease)     Hypertension     Palpitations     last assessed - 30Apr2013    Peroneal tendonitis, right     last assessed - 02Oct2013    Right lumbar radiculopathy 03/17/2016    Thyroid cancer (HCC)     Thyroid nodule     Trochanteric bursitis of left hip 03/09/2018       General Information:   77 yo female referred to [facility]  for a VBS by Dr. Mora for dysphagia, assess for aspiration due to chronic cough. Pt stated she has a cough throughout the day, not consistent w/ eating and drinking. Pt stated she takes her pills w/ water and sometimes feel they get stuck sub sternal area.     Cognition:  WNL    Speech/Swallow Mech: Oral motor movements appeared  WNL; Dentition was  adequate;. Respiratory Status: RA;   Current diet: Regular diet w/ thin liquids.  Prior VBS none.     Pt was seen in radiology for a Video Barium Swallow Study, pt stood and was viewed laterally and AP  with the following consistencies: puree, solids, nectar thick liquids, thin liquids, barium pill w/ water by cup following the [facility] protocol.       Results are as follows:     **Images are available for review on PACS        Oral Stage:   Pt exhibited effective mastication of solids, good bolus manipulation and formation. Good oral control with thick and thin liquids by  cup and straw. Lingual mvt for bolus transport was WNL. Complete oral clearance noted.             Pharyngeal Stage:   Swallow initiation was prompt with complete hyolaryngeal excursion, epiglottic inversion, and airway closure. No pharyngeal retention, laryngeal penetration or aspiration observed.                   Esophageal Stage:   Briefly assessed, delayed clearance through the esophagus w/ intra-esophageal retropulsion. Otherwise no overt abnormality noted.        Assessment Summary:   Oral and pharyngeal stages of swallowing appeared WNL with no pharyngeal retention, laryngeal penetration or aspiration observed.   Esophageal dysfunction noted- consider follow up w/ GI              Recommendations:   Cont Regular diet w/ thin liquids  Meds as tolerated        Radha Sumner MA CCC-SLP  Speech Pathologist  [place] license # [id number]  [place] license # [id number]  Available via [employer]

## 2024-02-26 ENCOUNTER — APPOINTMENT (OUTPATIENT)
Dept: LAB | Facility: MEDICAL CENTER | Age: 79
End: 2024-02-26

## 2024-02-26 DIAGNOSIS — Z95.0 PACEMAKER: ICD-10-CM

## 2024-02-26 DIAGNOSIS — E11.29 TYPE 2 DIABETES MELLITUS WITH MICROALBUMINURIA, WITHOUT LONG-TERM CURRENT USE OF INSULIN: ICD-10-CM

## 2024-02-26 DIAGNOSIS — R80.9 TYPE 2 DIABETES MELLITUS WITH MICROALBUMINURIA, WITHOUT LONG-TERM CURRENT USE OF INSULIN: ICD-10-CM

## 2024-02-27 ENCOUNTER — HOSPITAL ENCOUNTER (OUTPATIENT)
Dept: RADIOLOGY | Facility: MEDICAL CENTER | Age: 79
Discharge: HOME | End: 2024-02-27
Payer: MEDICARE

## 2024-02-27 DIAGNOSIS — R35.0 FREQUENT URINATION: ICD-10-CM

## 2024-02-27 LAB
MYCOBACTERIUM SPEC CULT: NORMAL
RHODAMINE-AURAMINE STN SPEC: NORMAL

## 2024-02-27 PROCEDURE — 76770 US EXAM ABDO BACK WALL COMP: CPT

## 2024-02-27 RX ORDER — DILTIAZEM HYDROCHLORIDE 120 MG/1
120 CAPSULE, COATED, EXTENDED RELEASE ORAL 2 TIMES DAILY
Qty: 90 CAPSULE | Refills: 1 | Status: SHIPPED | OUTPATIENT
Start: 2024-02-27 | End: 2024-02-28

## 2024-02-28 ENCOUNTER — TELEPHONE (OUTPATIENT)
Age: 79
End: 2024-02-28

## 2024-02-28 DIAGNOSIS — Z12.31 SCREENING MAMMOGRAM FOR BREAST CANCER: Primary | ICD-10-CM

## 2024-02-28 DIAGNOSIS — Z95.0 PACEMAKER: ICD-10-CM

## 2024-02-28 RX ORDER — DILTIAZEM HYDROCHLORIDE 120 MG/1
CAPSULE, EXTENDED RELEASE ORAL
Qty: 270 CAPSULE | Refills: 1 | Status: SHIPPED | OUTPATIENT
Start: 2024-02-28

## 2024-02-28 NOTE — TELEPHONE ENCOUNTER
Mammogram ordered, patient was already scheduled for yearly and I sent a message informing patient that the order is in her chart.

## 2024-03-04 ENCOUNTER — OFFICE VISIT (OUTPATIENT)
Dept: CARDIOLOGY CLINIC | Facility: CLINIC | Age: 79
End: 2024-03-04
Payer: MEDICARE

## 2024-03-04 ENCOUNTER — NURSE TRIAGE (OUTPATIENT)
Age: 79
End: 2024-03-04

## 2024-03-04 VITALS
WEIGHT: 155.7 LBS | HEART RATE: 63 BPM | HEIGHT: 62 IN | SYSTOLIC BLOOD PRESSURE: 180 MMHG | DIASTOLIC BLOOD PRESSURE: 84 MMHG | BODY MASS INDEX: 28.65 KG/M2

## 2024-03-04 DIAGNOSIS — I10 ESSENTIAL HYPERTENSION: Primary | ICD-10-CM

## 2024-03-04 DIAGNOSIS — K22.9 ESOPHAGEAL ABNORMALITY: Primary | ICD-10-CM

## 2024-03-04 DIAGNOSIS — I27.20 PULMONARY HYPERTENSION (HCC): ICD-10-CM

## 2024-03-04 DIAGNOSIS — T17.908D ASPIRATION INTO AIRWAY, SUBSEQUENT ENCOUNTER: ICD-10-CM

## 2024-03-04 DIAGNOSIS — I50.32 CHRONIC DIASTOLIC CHF (CONGESTIVE HEART FAILURE) (HCC): ICD-10-CM

## 2024-03-04 DIAGNOSIS — I48.0 PAROXYSMAL ATRIAL FIBRILLATION (HCC): ICD-10-CM

## 2024-03-04 DIAGNOSIS — E78.2 MIXED HYPERLIPIDEMIA: ICD-10-CM

## 2024-03-04 DIAGNOSIS — Z95.0 S/P PLACEMENT OF CARDIAC PACEMAKER: ICD-10-CM

## 2024-03-04 DIAGNOSIS — R05.3 CHRONIC COUGH: ICD-10-CM

## 2024-03-04 PROCEDURE — 93000 ELECTROCARDIOGRAM COMPLETE: CPT | Performed by: INTERNAL MEDICINE

## 2024-03-04 PROCEDURE — 99214 OFFICE O/P EST MOD 30 MIN: CPT | Performed by: INTERNAL MEDICINE

## 2024-03-04 NOTE — TELEPHONE ENCOUNTER
Called pt back:    Cough initially better and then worse again.     Rec continue clearance reg with albuteorl + nebs BID    No fevers or chills    Discussed results of barium swallow: abnormal esophageal motion: rec GI referral, will place referral now    Pt has follow up with me in 2 weeks

## 2024-03-04 NOTE — TELEPHONE ENCOUNTER
"Reason for Disposition   Cough with no complications    Answer Assessment - Initial Assessment Questions  1. ONSET: \"When did the cough begin?\"       Chronic cough  2. SEVERITY: \"How bad is the cough today?\"       Improved but now getting worse again within the last night  3. SPUTUM: \"Describe the color of your sputum\" (none, dry cough; clear, white, yellow, green)      Occasionally clear sputum  4. HEMOPTYSIS: \"Are you coughing up any blood?\" If so ask: \"How much?\" (flecks, streaks, tablespoons, etc.)      Denies  5. DIFFICULTY BREATHING: \"Are you having difficulty breathing?\" If Yes, ask: \"How bad is it?\" (e.g., mild, moderate, severe)     - MILD: No SOB at rest, mild SOB with walking, speaks normally in sentences, can lay down, no retractions, pulse < 100.     - MODERATE: SOB at rest, SOB with minimal exertion and prefers to sit, cannot lie down flat, speaks in phrases, mild retractions, audible wheezing, pulse 100-120.     - SEVERE: Very SOB at rest, speaks in single words, struggling to breathe, sitting hunched forward, retractions, pulse > 120       Denies  6. FEVER: \"Do you have a fever?\" If Yes, ask: \"What is your temperature, how was it measured, and when did it start?\"      Felt feverish last night but did not measure temp  7. CARDIAC HISTORY: \"Do you have any history of heart disease?\" (e.g., heart attack, congestive heart failure)       See chart  8. LUNG HISTORY: \"Do you have any history of lung disease?\"  (e.g., pulmonary embolus, asthma, emphysema)      Bronchiectasis  9. PE RISK FACTORS: \"Do you have a history of blood clots?\" (or: recent major surgery, recent prolonged travel, bedridden)      No  10. OTHER SYMPTOMS: \"Do you have any other symptoms?\" (e.g., runny nose, wheezing, chest pain)        Chest tightness. Runny nose possibly. Denies wheezing   11. OTHER        Using neb treatments, inhaler and nasal spray    Protocols used: Cough-ADULT-[place]    "

## 2024-03-04 NOTE — TELEPHONE ENCOUNTER
Pt stated Pulm Provider: Ladonna    Actionable item: Seeking additional recs/discuss test results    What is the reason for the call/chief complaint?   Pt calling for continued cough. States cough initially improved with recommended treatments but within the last week has become worse again. Occasionally productive of clear sputum but otherwise dry cough. Slight runny nose. Felt feverish last night but has not measured temp. Does not feel feverish now. No SOB. Compliant with all meds. Pt seeking additional treatment recommendations and also would like to discuss test results.     Priority:

## 2024-03-04 NOTE — PROGRESS NOTES
Cardiology Follow Up    Farnaz Martin  1945  [id number]  CARDIOVASC PHYSICIAN  [address] [facility] [address]    1. Essential hypertension  POCT ECG      2. Paroxysmal atrial fibrillation (HCC)        3. Chronic diastolic CHF (congestive heart failure) (HCC)        4. Pulmonary hypertension (HCC)        5. Mixed hyperlipidemia        6. S/P placement of cardiac pacemaker            Discussion/Summary: She has a recent cough and reflux sounding symptoms agree with GI evaluation as possible contributing factor.  Blood pressures at home are well-controlled.  She is currently maintaining normal sinus rhythm.  Continue full anticoagulation.  Pacer checks have been reviewed.  No further cardiac testing will follow-up after GI evaluation.      Interval History:  76 yo pleasant female with  paroxysmal atrial fibrillation, hypertension, hyperlipidemia, permanent pacer presents for a follow-up visit.       Last time I saw her she had postop atrial fibrillation.  She is currently maintaining normal sinus rhythm.  Functional capacity has been good she is much more active.  This improvement occurred after knee surgery.  Currently feeling well denies any chest pain, shortness of breath, palpitations, lightheadedness, dizziness, or syncope.  There is been no lower extremity edema, PND, orthopnea.  She has been taking all medications as prescribed.  Problem List       Generalized edema    Essential hypertension    Dyslipidemia    Sacroiliitis (HCC)    Acute pain of right knee    Allergic rhinitis    Fibromyalgia    Hyperlipidemia    Insomnia    Lumbar spondylosis    Lumbar stenosis    Osteoarthrosis, hand    Osteoarthritis of knee    Overview Signed 10/4/2018  5:41 PM by Naveen Monsivais MD     Laterality: Right         Osteopenia    Paroxysmal atrial fibrillation (HCC)    Peripheral neuropathy    Restless legs syndrome    Right lumbar radiculopathy    TMJ syndrome    Vitamin D  deficiency    Effusion of right knee          Past Medical History:   Diagnosis Date    Actinic keratosis     last assessed - 06Jun2014    Arthritis     Atrial fibrillation with rapid ventricular response (HCC)     last assessed - 26Apr2016    Basal cell carcinoma     Benign colon polyp     last assessed - 27Apr2015    CHF (congestive heart failure) ([facility]) 06/2022    Disease of thyroid gland     Effusion of knee joint right     Resolved - 19Apr2016    Esophageal reflux     Fibromyalgia     last assessed - [id number]    Fibromyalgia, primary     GERD (gastroesophageal reflux disease)     Hypertension     Palpitations     last assessed - 30Apr2013    Peroneal tendonitis, right     last assessed - 02Oct2013    Right lumbar radiculopathy 03/17/2016    Thyroid cancer (HCC)     Thyroid nodule     Trochanteric bursitis of left hip 03/09/2018     Social History     Socioeconomic History    Marital status:      Spouse name: Not on file    Number of children: Not on file    Years of education: Not on file    Highest education level: Not on file   Occupational History    Not on file   Tobacco Use    Smoking status: Never    Smokeless tobacco: Never   Vaping Use    Vaping status: Never Used   Substance and Sexual Activity    Alcohol use: Not Currently    Drug use: Never    Sexual activity: Not Currently   Other Topics Concern    Not on file   Social History Narrative    Not on file     Social Determinants of Health     Financial Resource Strain: Patient Unable To Answer (12/19/2023)    Overall Financial Resource Strain (CARDIA)     Difficulty of Paying Living Expenses: Patient unable to answer   Food Insecurity: No Food Insecurity (1/5/2024)    Hunger Vital Sign     Worried About Running Out of Food in the Last Year: Never true     Ran Out of Food in the Last Year: Never true   Transportation Needs: No Transportation Needs (1/5/2024)    PRAPARE - Transportation     Lack of Transportation (Medical): No     Lack of  Transportation (Non-Medical): No   Physical Activity: Not on file   Stress: Not on file   Social Connections: Not on file   Intimate Partner Violence: Not At Risk (7/31/2023)    Received from [facility]    Humiliation, Afraid, Rape, and Kick questionnaire     Fear of Current or Ex-Partner: No     Emotionally Abused: No     Physically Abused: No     Sexually Abused: No   Housing Stability: Low Risk  (1/5/2024)    Housing Stability Vital Sign     Unable to Pay for Housing in the Last Year: No     Number of Places Lived in the Last Year: 1     Unstable Housing in the Last Year: No      Family History   Problem Relation Age of Onset    Heart disease Mother     Diabetes Mother     Heart disease Father     Coronary artery disease Father     Stroke Father         cerebrovascular accident    Heart attack Father         myocardial infarction    Sudden death Father         scd    Other Family         Back disorder    Coronary artery disease Family     Neuropathy Family     Osteoporosis Family     No Known Problems Daughter     No Known Problems Maternal Grandmother     No Known Problems Maternal Grandfather     No Known Problems Paternal Grandmother     No Known Problems Paternal Grandfather     Cancer Maternal Uncle     Breast cancer Maternal Aunt 65    No Known Problems Son     No Known Problems Maternal Aunt     No Known Problems Maternal Aunt     No Known Problems Maternal Aunt     No Known Problems Paternal Aunt     No Known Problems Paternal Aunt     Anuerysm Neg Hx     Clotting disorder Neg Hx     Arrhythmia Neg Hx     Heart failure Neg Hx      Past Surgical History:   Procedure Laterality Date    CARDIAC ELECTROPHYSIOLOGY STUDY AND ABLATION  08/2022    CATARACT EXTRACTION Bilateral     COLONOSCOPY  03/2018    COLONOSCOPY W/ POLYPECTOMY  2021    repeat in 5 years    EYE SURGERY      HYSTERECTOMY      JOINT REPLACEMENT Left     knee    ID NEUROPLASTY &/TRANSPOS MEDIAN NRV CARPAL TUNNE Right 11/14/2019     Procedure: RELEASE CARPAL TUNNEL;  Surgeon: Onesimo Tate MD;  Location: [facility];  Service: Orthopedics    DE TOTAL THYROID LOBECTOMY UNI W/WO ISTHMUSECTOMY Left 12/16/2020    Procedure: Left THYROID LOBECTOMY;  Surgeon: Jhony Bhatt MD;  Location: [facility];  Service: ENT    RECTAL POLYPECTOMY      REPLACEMENT TOTAL KNEE Left     last assessed - [id number]    TONSILLECTOMY      TOTAL ABDOMINAL HYSTERECTOMY      TUBAL LIGATION      US GUIDED THYROID BIOPSY  10/14/2020       Current Outpatient Medications:     acetylcysteine (MUCOMYST) 10 % nebulizer solution, Take 4 mL by nebulization every 4 (four) hours, Disp: 90 mL, Rfl: 2    albuterol (2.5 mg/3 mL) 0.083 % nebulizer solution, Take 3 mL (2.5 mg total) by nebulization every 6 (six) hours as needed for wheezing or shortness of breath, Disp: 1080 mL, Rfl: 0    albuterol (ProAir HFA) 90 mcg/act inhaler, Inhale 2 puffs every 6 (six) hours as needed for wheezing, Disp: 8.5 g, Rfl: 3    apixaban (ELIQUIS) 5 mg, Take 1 tablet (5 mg total) by mouth 2 (two) times a day, Disp: 60 tablet, Rfl: 3    ascorbic acid (VITAMIN C) 500 mg tablet, Take 500 mg by mouth daily , Disp: , Rfl:     Cartia  MG 24 hr capsule, take 1 capsule by mouth twice a day .YOU MAKE TAKE AN ADDITIONAL CAPSULE DAILY IF YOU HAVE PALPITATIONS AND ELEVATED HEART RATE CONSISTENT WITH YOUR A FIB. MAX 3 CAPS DAILY, Disp: 270 capsule, Rfl: 1    Cetirizine HCl (ZyrTEC Allergy) 10 MG CAPS, Take 10 mg by mouth in the morning, Disp: , Rfl:     Cholecalciferol 50 MCG (2000 UT) CAPS, Take 2,000 Units by mouth 2 (two) times a day, Disp: , Rfl:     Chromium Picolinate (CHROMIUM PICOLATE PO), Take 1 tablet by mouth daily, Disp: , Rfl:     DULoxetine (CYMBALTA) 30 mg delayed release capsule, take 1 capsule by mouth once daily, Disp: 30 capsule, Rfl: 2    ezetimibe (ZETIA) 10 mg tablet, take 1 tablet by mouth once daily, Disp: 90 tablet, Rfl: 3    flecainide (TAMBOCOR) 100 mg tablet, Take 1 tablet (100 mg  total) by mouth 2 (two) times a day, Disp: 55052 tablet, Rfl: 3    furosemide (LASIX) 20 mg tablet, Take 1 tablet (20 mg total) by mouth daily take 2 tablet by mouth daily if needed for shortness of breath WEIGHT GAIN 3 LBS IN 1 DAY OR LOWER LEG SWELLING, Disp: 90 tablet, Rfl: 3    gabapentin (NEURONTIN) 300 mg capsule, take 1 capsule by mouth at bedtime, Disp: 90 capsule, Rfl: 1    losartan (COZAAR) 50 mg tablet, take 1 tablet by mouth twice a day, Disp: 180 tablet, Rfl: 3    metoprolol succinate (TOPROL-XL) 100 mg 24 hr tablet, Take 1 tablet (100 mg total) by mouth every 12 (twelve) hours, Disp: , Rfl:     rOPINIRole (REQUIP) 1 mg tablet, Take 2 tablets (2 mg total) by mouth daily at bedtime, Disp: 180 tablet, Rfl: 1    Saccharomyces boulardii (Probiotic) 250 MG CAPS, Take 1 capsule by mouth daily, Disp: , Rfl:     TURMERIC PO, Take 1 capsule by mouth 2 (two) times a day, Disp: , Rfl:     zolpidem (AMBIEN) 10 mg tablet, Take 1 tablet (10 mg total) by mouth daily at bedtime as needed for sleep, Disp: 90 tablet, Rfl: 1    ipratropium (ATROVENT) 0.03 % nasal spray, 2 sprays into each nostril 3 (three) times a day Begin once per day, then progress to twice per day. Progress to three times a day as tolerated. (Patient not taking: Reported on 1/31/2024), Disp: 90 mL, Rfl: 3    sodium chloride 3 % inhalation solution, USE 4 MILLILITERS IN NEBULIZER TWICE A DAY, Disp: 240 mL, Rfl: 2  Allergies   Allergen Reactions    Sotalol Other (See Comments)     Prolonged QTc leading to torsades de pointes     Penicillins Other (See Comments)     As a child calcium deposit in the arm     Ace Inhibitors GI Intolerance     Did feel well on it    Omeprazole Abdominal Pain, Rash and Vomiting     stomach pain, vomiting, rash       Labs:     Chemistry        Component Value Date/Time     05/06/2015 1116    K 4.2 01/22/2024 0855    K 4.4 08/01/2023 0404    CL 95 (L) 01/22/2024 0855    CL 96 (L) 08/01/2023 0404    CO2 29 01/22/2024 0855  "   CO2 25 08/01/2023 0404    BUN 18 01/22/2024 0855    BUN 15 08/01/2023 0404    CREATININE 0.77 01/22/2024 0855    CREATININE 0.81 08/01/2023 0404        Component Value Date/Time    CALCIUM 9.8 01/22/2024 0855    CALCIUM 9.3 08/01/2023 0404    ALKPHOS 88 01/22/2024 0855    ALKPHOS 60 05/06/2015 1116    AST 17 01/22/2024 0855    AST 28 05/06/2015 1116    ALT 16 01/22/2024 0855    ALT 34 05/06/2015 1116    BILITOT 0.78 05/06/2015 1116            Lab Results   Component Value Date    CHOL 205 05/06/2015    CHOL 195 07/10/2014     Lab Results   Component Value Date    HDL 46 (L) 01/09/2024    HDL 53 07/03/2023    HDL 66 04/01/2021     Lab Results   Component Value Date    LDLCALC 64 01/09/2024    LDLCALC 96 07/03/2023    LDLCALC 88 04/01/2021     Lab Results   Component Value Date    TRIG 63 01/09/2024    TRIG 68 07/03/2023    TRIG 112 04/01/2021     No results found for: \"CHOLHDL\"    Imaging: No results found.    ECG:    Sinus bradycardia nonspecific T-wave changes     Review of Systems   Constitutional: Negative.   HENT: Negative.     Eyes: Negative.    Cardiovascular: Negative.  Negative for leg swelling.   Respiratory: Negative.     Endocrine: Negative.    Hematologic/Lymphatic: Negative.    Skin: Negative.    Musculoskeletal: Negative.    Gastrointestinal: Negative.    Genitourinary: Negative.    Neurological: Negative.    Psychiatric/Behavioral: Negative.         Vitals:    03/04/24 1434   BP: (!) 180/84   Pulse: 63     Vitals:    03/04/24 1434   Weight: 70.6 kg (155 lb 11.2 oz)     Height: 5' 2\" (157.5 cm)   Body mass index is 28.48 kg/m².  Physical Exam:  Vital signs reviewed  General:  Alert and cooperative, appears stated age, no acute distress  HEENT:  PERRLA, EOMI, no scleral icterus, no conjunctival pallor  Neck:  No lymphadenopathy, no thyromegaly, no carotid bruits, no elevated JVP  Heart:  Regular rate and rhythm, normal S1/S2, no S3/S4, no murmur, rubs or gallops.  PMI nondisplaced  Lungs:  Clear to " auscultation bilaterally, no wheezes rales or rhonchi  Abdomen:  Soft, non-tender, positive bowel sounds, no rebound or guarding,   no organomegaly   Extremities:  Normal range of motion.  No clubbing, cyanosis or edema   Vascular:  2+ pedal pulses  Skin:  No rashes or lesions on exposed skin  Neurologic:  Cranial nerves II-XII grossly intact without focal deficits  Psych:  Normal mood and affect

## 2024-03-05 ENCOUNTER — TELEPHONE (OUTPATIENT)
Dept: UROLOGY | Facility: CLINIC | Age: 79
End: 2024-03-05

## 2024-03-05 LAB
MYCOBACTERIUM SPEC CULT: NORMAL
RHODAMINE-AURAMINE STN SPEC: NORMAL

## 2024-03-05 NOTE — TELEPHONE ENCOUNTER
"Spoke with Pt, shared that scans are \"normal with no concerning findings\". Confirmed next appt in June.   "

## 2024-03-07 DIAGNOSIS — F33.0 MILD EPISODE OF RECURRENT MAJOR DEPRESSIVE DISORDER (HCC): ICD-10-CM

## 2024-03-07 RX ORDER — DULOXETIN HYDROCHLORIDE 30 MG/1
CAPSULE, DELAYED RELEASE ORAL
Qty: 30 CAPSULE | Refills: 5 | Status: SHIPPED | OUTPATIENT
Start: 2024-03-07 | End: 2024-03-13

## 2024-03-11 ENCOUNTER — HOSPITAL ENCOUNTER (OUTPATIENT)
Dept: RADIOLOGY | Facility: MEDICAL CENTER | Age: 79
Discharge: HOME | End: 2024-03-11
Payer: MEDICARE

## 2024-03-11 VITALS — WEIGHT: 155 LBS | BODY MASS INDEX: 28.52 KG/M2 | HEIGHT: 62 IN

## 2024-03-11 DIAGNOSIS — I48.0 PAROXYSMAL ATRIAL FIBRILLATION (HCC): ICD-10-CM

## 2024-03-11 DIAGNOSIS — Z12.31 SCREENING MAMMOGRAM FOR BREAST CANCER: ICD-10-CM

## 2024-03-11 PROCEDURE — 77067 SCR MAMMO BI INCL CAD: CPT

## 2024-03-11 PROCEDURE — 77063 BREAST TOMOSYNTHESIS BI: CPT

## 2024-03-11 RX ORDER — METOPROLOL SUCCINATE 100 MG/1
100 TABLET, EXTENDED RELEASE ORAL EVERY 12 HOURS
Qty: 180 TABLET | Refills: 3 | Status: SHIPPED | OUTPATIENT
Start: 2024-03-11

## 2024-03-12 LAB
MYCOBACTERIUM SPEC CULT: NORMAL
RHODAMINE-AURAMINE STN SPEC: NORMAL

## 2024-03-13 ENCOUNTER — TELEPHONE (OUTPATIENT)
Age: 79
End: 2024-03-13

## 2024-03-13 DIAGNOSIS — M79.7 FIBROMYALGIA: Primary | ICD-10-CM

## 2024-03-13 RX ORDER — DULOXETIN HYDROCHLORIDE 20 MG/1
20 CAPSULE, DELAYED RELEASE ORAL DAILY
Qty: 30 CAPSULE | Refills: 0 | Status: SHIPPED | OUTPATIENT
Start: 2024-03-13

## 2024-03-13 NOTE — TELEPHONE ENCOUNTER
Called patient gave her message, patient stated she  her medication refill yesterday for her cymbalta 30 mg.  Would like to new prescription sent.

## 2024-03-13 NOTE — TELEPHONE ENCOUNTER
Patient called in after last refill for DULoxetine (CYMBALTA) 30 mg delayed release capsule . Patient read the literature that accompanied the refill and feels that the list of side effects, she would like to discontinue taking the medication. She feels she does not need to take it. Please follow up with patient with PCP response to stopping medication and if she needs to wean off the medication.

## 2024-03-14 ENCOUNTER — APPOINTMENT (OUTPATIENT)
Dept: LAB | Facility: CLINIC | Age: 79
End: 2024-03-14
Payer: MEDICARE

## 2024-03-14 ENCOUNTER — NURSE TRIAGE (OUTPATIENT)
Age: 79
End: 2024-03-14

## 2024-03-14 DIAGNOSIS — N39.0 URINARY TRACT INFECTION WITHOUT HEMATURIA, SITE UNSPECIFIED: Primary | ICD-10-CM

## 2024-03-14 DIAGNOSIS — R31.29 MICROSCOPIC HEMATURIA: ICD-10-CM

## 2024-03-14 DIAGNOSIS — N39.0 URINARY TRACT INFECTION WITHOUT HEMATURIA, SITE UNSPECIFIED: ICD-10-CM

## 2024-03-14 LAB
BACTERIA UR QL AUTO: ABNORMAL /HPF
BILIRUB UR QL STRIP: NEGATIVE
CLARITY UR: ABNORMAL
COLOR UR: ABNORMAL
GLUCOSE UR STRIP-MCNC: NEGATIVE MG/DL
HGB UR QL STRIP.AUTO: ABNORMAL
KETONES UR STRIP-MCNC: NEGATIVE MG/DL
LEUKOCYTE ESTERASE UR QL STRIP: ABNORMAL
NITRITE UR QL STRIP: NEGATIVE
NON-SQ EPI CELLS URNS QL MICRO: ABNORMAL /HPF
PH UR STRIP.AUTO: 6 [PH]
PROT UR STRIP-MCNC: ABNORMAL MG/DL
RBC #/AREA URNS AUTO: ABNORMAL /HPF
SP GR UR STRIP.AUTO: 1.01 (ref 1–1.03)
UROBILINOGEN UR STRIP-ACNC: <2 MG/DL
WBC #/AREA URNS AUTO: ABNORMAL /HPF
WBC CLUMPS # UR AUTO: PRESENT /UL

## 2024-03-14 PROCEDURE — 87086 URINE CULTURE/COLONY COUNT: CPT

## 2024-03-14 PROCEDURE — 81001 URINALYSIS AUTO W/SCOPE: CPT

## 2024-03-14 NOTE — TELEPHONE ENCOUNTER
"Patient called and she is experiencing urinary symptoms. History of recurrent urinary tract infections. She is having urinary frequency and urgency and voiding little amounts. Burns with urination and voiding little amounts. She is not passing blood in her urine. Denies any abdominal pain, no N/V fevers or chills. She was given care advice to hydrate with 40-60 ounces of water per day and to avoid bladder irritants. She was reviewed ER and UC precautions. Urine orders placed. Please advise of any further recommendations.   Answer Assessment - Initial Assessment Questions  1. SYMPTOM: \"What's the main symptom you're concerned about?\" (e.g., frequency, incontinence)      Frequency and urgency of urination and voiding little amount.   2. ONSET: \"When did the  symptoms  start?\"      Last night  3. PAIN: \"Is there any pain?\" If Yes, ask: \"How bad is it?\" (Scale: 1-10; mild, moderate, severe)      Pelvic pressure  4. CAUSE: \"What do you think is causing the symptoms?\"      Recurrent uti  5. OTHER SYMPTOMS: \"Do you have any other symptoms?\" (e.g., fever, flank pain, blood in urine, pain with urination)      Voiding little amounts    Protocols used: Urinary Symptoms-ADULT-[place]    "

## 2024-03-14 NOTE — TELEPHONE ENCOUNTER
"Regarding: Patient called today c/o burning with urination, frequent urination  ----- Message from Javier Sanchez sent at 3/14/2024 11:41 AM EDT -----  Patient called today c/o burning with urination, frequent urination \"about every five minutes and not getting a whole lot out when I go.\"    Pt states that the burning is not just while urinating, but \"all of the time\".    Denies any other symptoms.    Call back [phone]    "

## 2024-03-15 LAB — BACTERIA UR CULT: NORMAL

## 2024-03-15 NOTE — TELEPHONE ENCOUNTER
Urine testing does not reveal a urinary tract infection but there is significant microscopic hematuria with innumerable RBC.  This is abnormal and needs to be further investigated.  Would recommend cystoscopy in the office.  Please convey to patient that this is a scope that we will evaluate the urethra and bladder to ensure that there is no concerning abnormalities.  Please explain how this is done and that it only usually takes about 3 minutes or so.  Ultrasound in February was negative.  For further evaluation of the ureter specifically we can obtain a CT urogram to ensure that there is no stone within the ureter or lesion.  This has been ordered    Please schedule for CT renal protocol/cystoscopy

## 2024-03-18 NOTE — TELEPHONE ENCOUNTER
Pt called stated that she had urine test last Thursday and did not hear from us regarding the results.     Please review

## 2024-03-19 ENCOUNTER — OFFICE VISIT (OUTPATIENT)
Dept: PULMONOLOGY | Facility: CLINIC | Age: 79
End: 2024-03-19
Payer: MEDICARE

## 2024-03-19 VITALS
HEART RATE: 66 BPM | DIASTOLIC BLOOD PRESSURE: 90 MMHG | BODY MASS INDEX: 29.4 KG/M2 | HEIGHT: 62 IN | SYSTOLIC BLOOD PRESSURE: 140 MMHG | OXYGEN SATURATION: 94 % | TEMPERATURE: 98.4 F | WEIGHT: 159.8 LBS

## 2024-03-19 DIAGNOSIS — R05.3 CHRONIC COUGH: ICD-10-CM

## 2024-03-19 DIAGNOSIS — J47.9 BRONCHIECTASIS WITHOUT COMPLICATION (HCC): ICD-10-CM

## 2024-03-19 DIAGNOSIS — J47.9 BRONCHIECTASIS WITHOUT COMPLICATION (HCC): Primary | ICD-10-CM

## 2024-03-19 LAB
MYCOBACTERIUM SPEC CULT: NORMAL
RHODAMINE-AURAMINE STN SPEC: NORMAL

## 2024-03-19 PROCEDURE — 99215 OFFICE O/P EST HI 40 MIN: CPT | Performed by: INTERNAL MEDICINE

## 2024-03-19 PROCEDURE — G2211 COMPLEX E/M VISIT ADD ON: HCPCS | Performed by: INTERNAL MEDICINE

## 2024-03-19 RX ORDER — SODIUM CHLORIDE FOR INHALATION 3 %
4 VIAL, NEBULIZER (ML) INHALATION AS NEEDED
Qty: 150 ML | Refills: 2 | Status: SHIPPED | OUTPATIENT
Start: 2024-03-19 | End: 2024-03-19

## 2024-03-19 RX ORDER — SODIUM CHLORIDE FOR INHALATION 3 %
VIAL, NEBULIZER (ML) INHALATION
Qty: 152 ML | Refills: 2 | Status: SHIPPED | OUTPATIENT
Start: 2024-03-19 | End: 2024-03-25 | Stop reason: SDUPTHER

## 2024-03-19 NOTE — TELEPHONE ENCOUNTER
Ct scan scheduled for 3/26   Urine testing does not reveal a urinary tract infection but there is significant microscopic hematuria with innumerable RBC.  This is abnormal and needs to be further investigated.  Would recommend cystoscopy in the office.  Please convey to patient that this is a scope that we will evaluate the urethra and bladder to ensure that there is no concerning abnormalities.  Please explain how this is done and that it only usually takes about 3 minutes or so.  Ultrasound in February was negative.    She put me on speaker with her son- in -law as I relayed information- If they choose to have Cysto they will call back

## 2024-03-19 NOTE — PROGRESS NOTES
Consultation - Pulmonary Medicine   Farnaz Martin 78 y.o. female MRN: [id number]    Physician Requesting Consult: Naveen Monsivais  Reason for Consult: Chronic cough    Farnaz Martin is a 78 y.o. female hx Afib on AC, CHF, MR, thyroid ca sp resection, chronic rhinitis, HTN, PH, MGUS, who presents for evaluation of chronic cough.    # Chronic Cough  # Bronchiolitis  # Very mild bronchiectasis  # Chronic Rhinitis  Pt with chronic cough for 6-12 months. Imaging shows very minimal airway dilation (though read as mild bronchiectasis) and bibasilar tree in bud consistent with bronchiolitis could be from chronic aspiration vs prior infection. No prior hx lung disease, smoking, atypical exposures. Normal immunoglobulin panel and no hx systemic rheum diseases  Allergy panel neg, highest eos 400 historically suggests possible type 2 inflammation.     PFTs show normal spirometry and lung volumes, mod gas transfer defect  Will start with some workup and mucous clearance therapy  Barium swallow with no significant penetration but esophageal dysfunction (delayed clearance w retropulsion) noticed  Allergy panel neg    Cough improved somewhat with clearance therapy    Micro:  Sputum 2/15/24: neg AFB, Pseudomonas fluorescens (typically considered not pathogenic    - mucous clearance: albuterol followed by saline neb followed by flutter valve (provided) and palm coughing, do BID  - continue nasal spray  - GERD precautions discussed, not on PPI  - GI referral for dysmotility (scheduled April)  - using Atrovent nebs prn, no significant sinus congestion right now      # PH  Likely due to CHF and MR  - continue cardiology follow up    Follow up in 3 months    Vaccines    Immunization History   Administered Date(s) Administered    COVID-19 MODERNA VACC 0.5 ML IM 01/27/2021, 01/27/2021, 02/24/2021, 02/24/2021, 11/23/2021    COVID-19 Moderna Vac BIVALENT 12 Yr+ IM 0.5 ML 12/09/2022    COVID-19 Moderna mRNA Vaccine 12 Yr+ 50 mcg/0.5 mL  (Spikevax) 01/02/2024    INFLUENZA 12/23/2015, 12/23/2015, 11/07/2016, 11/07/2016, 10/18/2017, 10/18/2017, 12/04/2018, 12/04/2018, 09/26/2019, 09/08/2020, 11/03/2021, 09/19/2022, 09/19/2022, 12/19/2023    Influenza Split High Dose Preservative Free IM 10/01/2014, 12/23/2015, 11/07/2016, 10/18/2017    Influenza, high dose seasonal 0.7 mL 12/04/2018, 09/26/2019, 09/08/2020, 11/03/2021, 09/19/2022, 12/19/2023    Influenza, seasonal, injectable 10/16/2012    Pneumococcal Conjugate 13-Valent 04/27/2015    Pneumococcal Polysaccharide PPV23 03/29/2022, 03/29/2022        I have spent a total time of 40 minutes on 03/19/24 in caring for this patient including Diagnostic results, Instructions for management, Patient and family education, Importance of tx compliance, Risk factor reductions, Impressions, Counseling / Coordination of care, Documenting in the medical record, Reviewing / ordering tests, medicine, procedures  , and Obtaining or reviewing history  .   _____________________________________________________________IInterval Hx:    Barium swallow with mild esophageal dysmotility, no obvious aspiration  Pt reports cough improving but still has a cough at night. Does have some pill trouble swallowing but no aspiration. Some GERD symptoms  No fevers, chills, night sweats    HPI:    Farnaz Martin is a 78 y.o. female hx Afib on AC, CHF, MR, thyroid ca sp resection, chronic rhinitis, HTN, PH, MGUS, who presents for evaluation of chronic cough.    Nonsmoker, no lung problems or asthma  Was exposed to second hand smoke from   Cough started this summer  Occasionally some phlegm clear or yellow, never blood  No significant weight changes, but trying to lose weight  Did have one fever last week but no frequent fevers  No night sweats  Did have a runny nose, atrovent did not help  No SOB  Does have some pain with persistent coughing  No allergies  Some reflux, not taking anything  No significant cough with eating or  drinking though does have some trouble swallowing pills  Still with runny nose  Formerly on ACE -I but not recently      Occupational/Exposure history:  Worked delivering flowers,  work, 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    Current Medications:    Current Outpatient Medications:     acetylcysteine (MUCOMYST) 10 % nebulizer solution, Take 4 mL by nebulization every 4 (four) hours, Disp: 90 mL, Rfl: 2    albuterol (2.5 mg/3 mL) 0.083 % nebulizer solution, Take 3 mL (2.5 mg total) by nebulization every 6 (six) hours as needed for wheezing or shortness of breath, Disp: 1080 mL, Rfl: 0    albuterol (ProAir HFA) 90 mcg/act inhaler, Inhale 2 puffs every 6 (six) hours as needed for wheezing, Disp: 8.5 g, Rfl: 3    apixaban (ELIQUIS) 5 mg, Take 1 tablet (5 mg total) by mouth 2 (two) times a day, Disp: 60 tablet, Rfl: 3    ascorbic acid (VITAMIN C) 500 mg tablet, Take 500 mg by mouth daily , Disp: , Rfl:     Cartia  MG 24 hr capsule, take 1 capsule by mouth twice a day .YOU MAKE TAKE AN ADDITIONAL CAPSULE DAILY IF YOU HAVE PALPITATIONS AND ELEVATED HEART RATE CONSISTENT WITH YOUR A FIB. MAX 3 CAPS DAILY, Disp: 270 capsule, Rfl: 1    Cetirizine HCl (ZyrTEC Allergy) 10 MG CAPS, Take 10 mg by mouth in the morning, Disp: , Rfl:     Cholecalciferol 50 MCG (2000 UT) CAPS, Take 2,000 Units by mouth 2 (two) times a day, Disp: , Rfl:     Chromium Picolinate (CHROMIUM PICOLATE PO), Take 1 tablet by mouth daily, Disp: , Rfl:     DULoxetine (CYMBALTA) 20 mg capsule, Take 1 capsule (20 mg total) by mouth daily, Disp: 30 capsule, Rfl: 0    ezetimibe (ZETIA) 10 mg tablet, take 1 tablet by mouth once daily, Disp: 90 tablet, Rfl: 3    flecainide (TAMBOCOR) 100 mg tablet, Take 1 tablet (100 mg total) by mouth 2 (two) times a day, Disp: 66033 tablet, Rfl: 3     furosemide (LASIX) 20 mg tablet, Take 1 tablet (20 mg total) by mouth daily take 2 tablet by mouth daily if needed for shortness of breath WEIGHT GAIN 3 LBS IN 1 DAY OR LOWER LEG SWELLING, Disp: 90 tablet, Rfl: 3    gabapentin (NEURONTIN) 300 mg capsule, take 1 capsule by mouth at bedtime, Disp: 90 capsule, Rfl: 1    ipratropium (ATROVENT) 0.03 % nasal spray, 2 sprays into each nostril 3 (three) times a day Begin once per day, then progress to twice per day. Progress to three times a day as tolerated. (Patient not taking: Reported on 1/31/2024), Disp: 90 mL, Rfl: 3    losartan (COZAAR) 50 mg tablet, take 1 tablet by mouth twice a day, Disp: 180 tablet, Rfl: 3    metoprolol succinate (TOPROL-XL) 100 mg 24 hr tablet, Take 1 tablet (100 mg total) by mouth every 12 (twelve) hours, Disp: 180 tablet, Rfl: 3    rOPINIRole (REQUIP) 1 mg tablet, Take 2 tablets (2 mg total) by mouth daily at bedtime, Disp: 180 tablet, Rfl: 1    Saccharomyces boulardii (Probiotic) 250 MG CAPS, Take 1 capsule by mouth daily, Disp: , Rfl:     sodium chloride 3 % inhalation solution, USE 4 MILLILITERS IN NEBULIZER TWICE A DAY, Disp: 240 mL, Rfl: 2    TURMERIC PO, Take 1 capsule by mouth 2 (two) times a day, Disp: , Rfl:     zolpidem (AMBIEN) 10 mg tablet, Take 1 tablet (10 mg total) by mouth daily at bedtime as needed for sleep, Disp: 90 tablet, Rfl: 1    Historical Information   Past Medical History:   Diagnosis Date    Actinic keratosis     last assessed - 06Jun2014    Arthritis     Atrial fibrillation with rapid ventricular response (HCC)     last assessed - 26Apr2016    Basal cell carcinoma     Benign colon polyp     last assessed - 27Apr2015    CHF (congestive heart failure) (HCC) 06/2022    Disease of thyroid gland     Effusion of knee joint right     Resolved - 19Apr2016    Esophageal reflux     Fibromyalgia     last assessed - 27Dec2017    Fibromyalgia, primary     GERD (gastroesophageal reflux disease)     Hypertension     Palpitations      last assessed - [id number]    Peroneal tendonitis, right     last assessed - [id number]    Right lumbar radiculopathy 03/17/2016    Thyroid cancer (HCC)     Thyroid nodule     Trochanteric bursitis of left hip 03/09/2018     Past Surgical History:   Procedure Laterality Date    CARDIAC ELECTROPHYSIOLOGY STUDY AND ABLATION  08/2022    CATARACT EXTRACTION Bilateral     COLONOSCOPY  03/2018    COLONOSCOPY W/ POLYPECTOMY  2021    repeat in 5 years    EYE SURGERY      HYSTERECTOMY      JOINT REPLACEMENT Left     knee    OOPHORECTOMY      TN NEUROPLASTY &/TRANSPOS MEDIAN NRV CARPAL TUNNE Right 11/14/2019    Procedure: RELEASE CARPAL TUNNEL;  Surgeon: Onesimo Tate MD;  Location: [facility];  Service: Orthopedics    TN TOTAL THYROID LOBECTOMY UNI W/WO ISTHMUSECTOMY Left 12/16/2020    Procedure: Left THYROID LOBECTOMY;  Surgeon: Jhony Bhatt MD;  Location: [facility];  Service: ENT    RECTAL POLYPECTOMY      REPLACEMENT TOTAL KNEE Left     last assessed - [id number]    TONSILLECTOMY      TOTAL ABDOMINAL HYSTERECTOMY      TUBAL LIGATION      US GUIDED THYROID BIOPSY  10/14/2020     Social History   Social History     Tobacco Use   Smoking Status Never   Smokeless Tobacco Never       Family History:   Family History   Problem Relation Age of Onset    Heart disease Mother     Diabetes Mother     Heart disease Father     Coronary artery disease Father     Stroke Father         cerebrovascular accident    Heart attack Father         myocardial infarction    Sudden death Father         scd    Other Family         Back disorder    Coronary artery disease Family     Neuropathy Family     Osteoporosis Family     No Known Problems Daughter     No Known Problems Maternal Grandmother     No Known Problems Maternal Grandfather     No Known Problems Paternal Grandmother     No Known Problems Paternal Grandfather     Cancer Maternal Uncle     Breast cancer Maternal Aunt 65    No Known Problems Son     No Known Problems Maternal Aunt      "No Known Problems Maternal Aunt     No Known Problems Maternal Aunt     No Known Problems Paternal Aunt     No Known Problems Paternal Aunt     Anuerysm Neg Hx     Clotting disorder Neg Hx     Arrhythmia Neg Hx     Heart failure Neg Hx          PhysicalExamination:  Vitals:   /90 (BP Location: Left arm, Patient Position: Sitting, Cuff Size: Standard)   Pulse 66   Temp 98.4 °F (36.9 °C) (Tympanic)   Ht 5' 2\" (1.575 m)   Wt 72.5 kg (159 lb 12.8 oz)   LMP 02/01/1990 (Within Weeks)   SpO2 94%   BMI 29.23 kg/m²     Appearance -- NAD, speaking full sentences  HEENT -- anicteric sclera, clear OP, MMM  Neck -- no JVD  Heart -- RRR, no murmurs  Lungs -- mild bibasilar rhonchi, upper lung zones clear, coughing with deep breaths  Abdomen -- soft, NTND, +bs  Extremities -- WWP, no LE edema  Skin -- no rash  Neuro -- A&Ox3, wnl  Psych -- no obvious depression or hallucination        Diagnostic Data:  Labs:  I personally reviewed the most recent laboratory data pertinent to today's visit    Lab Results   Component Value Date    WBC 4.94 01/22/2024    HGB 12.8 01/22/2024    HCT 39.7 01/22/2024    MCV 89 01/22/2024     01/22/2024     Lab Results   Component Value Date    GLUCOSE 114 05/06/2015    CALCIUM 9.8 01/22/2024     05/06/2015    K 4.2 01/22/2024    CO2 29 01/22/2024    CL 95 (L) 01/22/2024    BUN 18 01/22/2024    CREATININE 0.77 01/22/2024     Lab Results   Component Value Date    IGE 53.9 12/20/2023     Lab Results   Component Value Date    ALT 16 01/22/2024    AST 17 01/22/2024    ALKPHOS 88 01/22/2024    BILITOT 0.78 05/06/2015       PFT results:  The most recent pulmonary function tests were reviewed.  2/2024: PFTs show normal spirometry and lung volumes, mod gas transfer defect    Imaging:  I personally reviewed the images on the PAC system pertinent to today's visit  CTA Chest 1/2024: Lingular segment left upper lobe discoid atelectasis. Mild bibasilar dependent changes noted. Right greater " than left bibasilar mild bronchiectasis suggesting sequelae of previous infectious or inflammatory process. (Similar to 6 month prior CT). No PE    Other studies:  TTE 12/2023: EF 55%, dilated LA,mod MR, mod TR, Ph with RVSP 53        Anabella Dougherty MD  [facility]

## 2024-03-20 ENCOUNTER — HOSPITAL ENCOUNTER (OUTPATIENT)
Dept: RADIOLOGY | Facility: MEDICAL CENTER | Age: 79
Discharge: HOME | End: 2024-03-20
Payer: MEDICARE

## 2024-03-20 DIAGNOSIS — E04.1 THYROID NODULE: ICD-10-CM

## 2024-03-20 PROCEDURE — 76536 US EXAM OF HEAD AND NECK: CPT

## 2024-03-20 RX ORDER — SODIUM CHLORIDE FOR INHALATION 3 %
VIAL, NEBULIZER (ML) INHALATION
Qty: 152 ML | Refills: 2 | OUTPATIENT
Start: 2024-03-20

## 2024-03-20 NOTE — TELEPHONE ENCOUNTER
Patient called stating the pharmacy does not have sodium chloride, they would need something else ordered in place of it. She is also requesting a script for Mucomyst 10% be sent as well. Both to the [facility].

## 2024-03-20 NOTE — TELEPHONE ENCOUNTER
----- Message from Shakira Borjas sent at 5/16/2017  2:54 PM CDT -----  Contact: Elida/[facility]  Elida wants to let  know that pt's appt has been scheduled. Pt will be seeing Dr. Yumi Holloway on 06/14/17 at 3pm. If any questions, Elida can be reached at [phone].   I already ordered this and then refileld on the same day.    If its backordered the pt can wait a week or 2 because this delay happened last time

## 2024-03-21 ENCOUNTER — REMOTE DEVICE CLINIC VISIT (OUTPATIENT)
Dept: CARDIOLOGY CLINIC | Facility: CLINIC | Age: 79
End: 2024-03-21
Payer: MEDICARE

## 2024-03-21 DIAGNOSIS — Z95.0 CARDIAC PACEMAKER IN SITU: Primary | ICD-10-CM

## 2024-03-21 PROCEDURE — 93296 REM INTERROG EVL PM/IDS: CPT | Performed by: INTERNAL MEDICINE

## 2024-03-21 PROCEDURE — 93294 REM INTERROG EVL PM/LDLS PM: CPT | Performed by: INTERNAL MEDICINE

## 2024-03-21 NOTE — PROGRESS NOTES
Results for orders placed or performed in visit on 03/21/24   Cardiac EP device report    Narrative    MDT-DUAL CHAMBER PPM (AAIR-DDDR MODE)/ ACTIVE SYSTEM IS MRI CONDITIONAL  CARELINK TRANSMISSION: BATTERY VOLTAGE ADEQUATE (10.9 YRS). AP-93%, -2%. ALL AVAILABLE LEAD PARAMETERS WITHIN NORMAL LIMITS. 5 AT/AFLUTTER EPISDOES MAX DURATION 14.6 MINS. PT ON ELIQUIS, DILTIAZEM, FLECAINIDE & METOPROLOL NORMAL DEVICE FUNCTION. GV

## 2024-03-22 ENCOUNTER — TELEPHONE (OUTPATIENT)
Age: 79
End: 2024-03-22

## 2024-03-22 DIAGNOSIS — J47.9 BRONCHIECTASIS WITHOUT COMPLICATION (HCC): Primary | ICD-10-CM

## 2024-03-22 RX ORDER — ACETYLCYSTEINE 200 MG/ML
4 SOLUTION ORAL; RESPIRATORY (INHALATION) EVERY 4 HOURS
Qty: 180 ML | Refills: 5 | Status: SHIPPED | OUTPATIENT
Start: 2024-03-22 | End: 2024-03-25 | Stop reason: SDUPTHER

## 2024-03-22 NOTE — TELEPHONE ENCOUNTER
Can you please let patient know that if she is requesting the nebulized form of acetylcysteine (Mucomyst)-is the alternative for her 3% NA-   if the pharmacy is out and cannot carry until summer and it was very effective I would asked that she try another pharmacy in the interim I did order Mucomyst

## 2024-03-22 NOTE — TELEPHONE ENCOUNTER
Outgoing call to inform patient of the new script sent to pharmacy.    Also advised to search for another pharmacy that has the 3% Sodium chloride nebulizer solution to be filled there as there aren't alternatives. Agrees and will call other local pharmacy.      All questions answered. No further needs at the moment.

## 2024-03-22 NOTE — TELEPHONE ENCOUNTER
Incoming call with requests for medication alternative + refill.    States that the sodium chloride 3% nebulizer solution wont be available until middle of summer at her current pharmacy. Requesting alternative for the interim. She does report significant improvement after use.    Also requesting Acetylcysteine refill (not visualized under active medications but it is in current medications in most recent note). For this, I sent to practice and not refill team.       All questions answered. No further needs at the moment.

## 2024-03-22 NOTE — TELEPHONE ENCOUNTER
Pt calls back and states she reached out to [facility] and [facility] and they are not able to fill it due to it being on back order from the manufacture. She has ran out of the sodium chloride and would like to know what to do since the weekend is coming up she is requesting a cb today

## 2024-03-22 NOTE — TELEPHONE ENCOUNTER
Pt states that she needs an alternative to sodium chloride / she went to her pharmacy in [place] and they told her it is on manufactuer back order and they might not get it until June/ July     Pt appreciate a call back for updates

## 2024-03-22 NOTE — TELEPHONE ENCOUNTER
Patient called back, explained the cystoscopy procedure and would like to move forward with Cystoscopy.     # [phone]

## 2024-03-25 ENCOUNTER — TELEPHONE (OUTPATIENT)
Dept: PULMONOLOGY | Facility: CLINIC | Age: 79
End: 2024-03-25

## 2024-03-25 DIAGNOSIS — J47.9 BRONCHIECTASIS WITHOUT COMPLICATION (HCC): ICD-10-CM

## 2024-03-25 RX ORDER — ACETYLCYSTEINE 200 MG/ML
4 SOLUTION ORAL; RESPIRATORY (INHALATION) EVERY 4 HOURS
Qty: 180 ML | Refills: 5 | Status: SHIPPED | OUTPATIENT
Start: 2024-03-25

## 2024-03-25 RX ORDER — SODIUM CHLORIDE FOR INHALATION 3 %
4 VIAL, NEBULIZER (ML) INHALATION AS NEEDED
Qty: 152 ML | Refills: 2 | Status: SHIPPED | OUTPATIENT
Start: 2024-03-25

## 2024-03-26 ENCOUNTER — HOSPITAL ENCOUNTER (OUTPATIENT)
Dept: RADIOLOGY | Facility: MEDICAL CENTER | Age: 79
Discharge: HOME | End: 2024-03-26
Payer: MEDICARE

## 2024-03-26 ENCOUNTER — TELEPHONE (OUTPATIENT)
Age: 79
End: 2024-03-26

## 2024-03-26 DIAGNOSIS — R31.29 MICROSCOPIC HEMATURIA: ICD-10-CM

## 2024-03-26 DIAGNOSIS — N28.89 RENAL MASS, LEFT: Primary | ICD-10-CM

## 2024-03-26 LAB
MYCOBACTERIUM SPEC CULT: NORMAL
RHODAMINE-AURAMINE STN SPEC: NORMAL

## 2024-03-26 PROCEDURE — 74178 CT ABD&PLV WO CNTR FLWD CNTR: CPT

## 2024-03-26 RX ADMIN — IOHEXOL 100 ML: 350 INJECTION, SOLUTION INTRAVENOUS at 09:36

## 2024-03-26 NOTE — TELEPHONE ENCOUNTER
Significant findings   Patient managed by Navdeep bridges   Radiology Test results-Radiology calling with report update   CT renal protocol      Please review imaging

## 2024-03-27 ENCOUNTER — PROCEDURE VISIT (OUTPATIENT)
Dept: UROLOGY | Facility: AMBULATORY SURGERY CENTER | Age: 79
End: 2024-03-27
Payer: MEDICARE

## 2024-03-27 VITALS
DIASTOLIC BLOOD PRESSURE: 78 MMHG | HEART RATE: 65 BPM | WEIGHT: 159 LBS | HEIGHT: 62 IN | SYSTOLIC BLOOD PRESSURE: 140 MMHG | BODY MASS INDEX: 29.26 KG/M2 | OXYGEN SATURATION: 97 %

## 2024-03-27 DIAGNOSIS — N39.0 URINARY TRACT INFECTION WITHOUT HEMATURIA, SITE UNSPECIFIED: Primary | ICD-10-CM

## 2024-03-27 PROCEDURE — 52000 CYSTOURETHROSCOPY: CPT | Performed by: UROLOGY

## 2024-03-27 PROCEDURE — 87086 URINE CULTURE/COLONY COUNT: CPT | Performed by: UROLOGY

## 2024-03-27 PROCEDURE — 99215 OFFICE O/P EST HI 40 MIN: CPT | Performed by: UROLOGY

## 2024-03-27 RX ORDER — SULFAMETHOXAZOLE AND TRIMETHOPRIM 800; 160 MG/1; MG/1
1 TABLET ORAL 2 TIMES DAILY
Qty: 14 TABLET | Refills: 0 | Status: SHIPPED | OUTPATIENT
Start: 2024-03-27 | End: 2024-04-03

## 2024-03-27 NOTE — Clinical Note
Hello, I wanted to be sure you saw Farnaz's CT results. Multiple findings requiring follow up. We will work with her and her daughter on the renal mass. Thank you, hope all is well. Arturo

## 2024-03-27 NOTE — TELEPHONE ENCOUNTER
Sees Dr. Mcgowan in office today at which time urologic findings will be discussed.  Will forward report to patient's gynecologist/PCP in regards to breast/pancreatic findings

## 2024-03-27 NOTE — LETTER
March 27, 2024     Naveen Monsivais MD  [address] [place] [id number]    Patient: Farnaz Martin   YOB: 1945   Date of Visit: 3/27/2024       Dear Dr. Monsivais:    Thank you for referring Farnaz Martin to me for evaluation. Below are my notes for this consultation.    If you have questions, please do not hesitate to call me. I look forward to following your patient along with you.         Sincerely,        Arturo Mcgowan MD        CC: No Recipients    Arturo Mcgowan MD  3/27/2024 11:21 AM  Sign when Signing Visit  3/27/2024    Farnaz Martin  1945  [id number]        Assessment  LUTS  Left renal lesion    Plan  We had extended discussion regarding several issues.  First, seems that she has a UTI once again.  She had Proteus in February.  She did not notice improvement after 1 week of Keflex.  Fortunately, no obvious abnormality on cystoscopy.  Will send 1 week of Bactrim and reevaluate symptoms.    We reviewed her multiple CT results.  Will defer to PCP regarding follow-up for pancreas, borderline iliac lymph node, possibly reactive, pulmonary nodule, external breast lesion which she says is chronic.    We did have an extended discussion regarding finding of left renal mass.  Explained that this is a small renal mass, 1.7 cm, but it is enhancing consistent with malignancy.  Typically these are low-grade malignancies, contained, with minimal risk of metastasis.  Discussed options including observation, percutaneous treatment with cryotherapy, and surgical excision.  Discussed technique, risk and benefits of surgery.  They understand that this is a major procedure in order to dissect out the kidney and its vessels, temporary clamp the renal artery, excise the mass, and perform renorrhaphy to repair the kidney.  This is in a difficult position for standard partial nephrectomy and risks potential total nephrectomy as well as bleeding.    May be candidate for  retroperitoneal technique from one of my partners.    Alternatively, may be a good candidate for percutaneous cryotherapy, based on age, and location and size of the mass.  Discussed technique, risk, benefits of this as well.  There is minimal morbidity associated with this other than bleeding and renal vascular issue.  Recovery is minimal.  There is risk of recurrence, but age 79 can be observed or managed.  Finally discussed observation with repeat scan in 6 months.  She would likely require repeat scans for some of the other issues identified.    She would like to discuss with her daughter and then let us know.  Will also contact PCP Dr. Monsivais.    All questions answered and they agree with the plan.        History of Present Illness  Farnaz is a 78 y.o. female with LUTS including dysuria, urgency, suprapubic pain, cloudy urine. E Coli UTI in September 2023, Proteus February 2024.   Subsequent culture contaminants. She does have microhematuria.    CT with multiple findings:   1.7 cm enhancing mass arising from the posterior medial aspect of the left kidney midpole.  Highly suspicious for malignancy.  Urology evaluation is advised.     Subtle haziness of the perivesicular fat which may represent a mild cystitis in the appropriate clinical setting. No discrete bladder lesions are identified. Correlation with urinalysis is recommended.     Hypodense lesion is present within the pancreatic head measuring up to 2.5 cm with a multicystic appearance, possibly representing a serous cystadenoma. Further characterization with abdominal MRI/MRCP with and without contrast is recommended.     Mildly enlarged left external iliac chain lymph node as described. Node may be reactive in etiology given its distance from the suspected left renal malignancy and the lack of CT evidence of metastatic disease elsewhere within the abdomen pelvis.   Short-term follow-up CT is recommended to ensure stability.     Left lower lobe pulmonary  nodule measuring 3.5 mm, stable from cardiac CT of 2/17/2021.     Superficial density is seen measuring 1.5 cm near the inferior portions of the right breast, not appreciably changed from August 2023. Clinical correlation recommended.       Cystoscopy     Date/Time  3/27/2024 10:30 AM     Performed by  Arturo Mcgowan MD   Authorized by  Arturo Mcgowan MD         Procedure Details:  Procedure type: cystoscopy    Additional Procedure Details: Patient was prepped with chlorhexidine and lidocaine jelly.  Flexible cystoscope was placed with some difficulty due to vaginal atrophy.  Cloudy urine obscures some visibility.  No mucosal abnormality obvious.  No suspicious mass or lesion noted.            Review of Systems  Review of Systems   Constitutional: Negative.    HENT: Negative.     Respiratory: Negative.     Cardiovascular: Negative.    Gastrointestinal: Negative.    Genitourinary:         As per HPI   Musculoskeletal: Negative.    Skin: Negative.    Neurological: Negative.    Hematological: Negative.          Past Medical History  Past Medical History:   Diagnosis Date   • Actinic keratosis     last assessed - 06Jun2014   • Arthritis    • Atrial fibrillation with rapid ventricular response (HCC)     last assessed - 26Apr2016   • Basal cell carcinoma    • Benign colon polyp     last assessed - 27Apr2015   • CHF (congestive heart failure) ([facility]) 06/2022   • Disease of thyroid gland    • Effusion of knee joint right     Resolved - 19Apr2016   • Esophageal reflux    • Fibromyalgia     last assessed - 27Dec2017   • Fibromyalgia, primary    • GERD (gastroesophageal reflux disease)    • Hypertension    • Palpitations     last assessed - 30Apr2013   • Peroneal tendonitis, right     last assessed - 02Oct2013   • Right lumbar radiculopathy 03/17/2016   • Thyroid cancer ([facility])    • Thyroid nodule    • Trochanteric bursitis of left hip 03/09/2018       Past Social History  Past Surgical History:   Procedure  Laterality Date   • CARDIAC ELECTROPHYSIOLOGY STUDY AND ABLATION  08/2022   • CATARACT EXTRACTION Bilateral    • COLONOSCOPY  03/2018   • COLONOSCOPY W/ POLYPECTOMY  2021    repeat in 5 years   • EYE SURGERY     • HYSTERECTOMY     • JOINT REPLACEMENT Left     knee   • OOPHORECTOMY     • NM NEUROPLASTY &/TRANSPOS MEDIAN NRV CARPAL TUNNE Right 11/14/2019    Procedure: RELEASE CARPAL TUNNEL;  Surgeon: Onesimo Tate MD;  Location: [facility];  Service: Orthopedics   • NM TOTAL THYROID LOBECTOMY UNI W/WO ISTHMUSECTOMY Left 12/16/2020    Procedure: Left THYROID LOBECTOMY;  Surgeon: Jhony Bhatt MD;  Location: [facility];  Service: ENT   • RECTAL POLYPECTOMY     • REPLACEMENT TOTAL KNEE Left     last assessed - 27Apr2015   • TONSILLECTOMY     • TOTAL ABDOMINAL HYSTERECTOMY     • TUBAL LIGATION     • US GUIDED THYROID BIOPSY  10/14/2020       Past Family History  Family History   Problem Relation Age of Onset   • Heart disease Mother    • Diabetes Mother    • Heart disease Father    • Coronary artery disease Father    • Stroke Father         cerebrovascular accident   • Heart attack Father         myocardial infarction   • Sudden death Father         scd   • Other Family         Back disorder   • Coronary artery disease Family    • Neuropathy Family    • Osteoporosis Family    • No Known Problems Daughter    • No Known Problems Maternal Grandmother    • No Known Problems Maternal Grandfather    • No Known Problems Paternal Grandmother    • No Known Problems Paternal Grandfather    • Cancer Maternal Uncle    • Breast cancer Maternal Aunt 65   • No Known Problems Son    • No Known Problems Maternal Aunt    • No Known Problems Maternal Aunt    • No Known Problems Maternal Aunt    • No Known Problems Paternal Aunt    • No Known Problems Paternal Aunt    • Anuerysm Neg Hx    • Clotting disorder Neg Hx    • Arrhythmia Neg Hx    • Heart failure Neg Hx        Past Social history  Social History     Socioeconomic History   • Marital  status:      Spouse name: Not on file   • Number of children: Not on file   • Years of education: Not on file   • Highest education level: Not on file   Occupational History   • Not on file   Tobacco Use   • Smoking status: Never   • Smokeless tobacco: Never   Vaping Use   • Vaping status: Never Used   Substance and Sexual Activity   • Alcohol use: Not Currently   • Drug use: Never   • Sexual activity: Not Currently   Other Topics Concern   • Not on file   Social History Narrative   • Not on file     Social Determinants of Health     Financial Resource Strain: Patient Unable To Answer (12/19/2023)    Overall Financial Resource Strain (CARDIA)    • Difficulty of Paying Living Expenses: Patient unable to answer   Food Insecurity: No Food Insecurity (1/5/2024)    Hunger Vital Sign    • Worried About Running Out of Food in the Last Year: Never true    • Ran Out of Food in the Last Year: Never true   Transportation Needs: No Transportation Needs (1/5/2024)    PRAPARE - Transportation    • Lack of Transportation (Medical): No    • Lack of Transportation (Non-Medical): No   Physical Activity: Not on file   Stress: Not on file   Social Connections: Not on file   Intimate Partner Violence: Not At Risk (7/31/2023)    Received from [facility]    Humiliation, Afraid, Rape, and Kick questionnaire    • Fear of Current or Ex-Partner: No    • Emotionally Abused: No    • Physically Abused: No    • Sexually Abused: No   Housing Stability: Low Risk  (1/5/2024)    Housing Stability Vital Sign    • Unable to Pay for Housing in the Last Year: No    • Number of Places Lived in the Last Year: 1    • Unstable Housing in the Last Year: No     Social History     Tobacco Use   Smoking Status Never   Smokeless Tobacco Never       Current Medications  Current Outpatient Medications   Medication Sig Dispense Refill   • acetylcysteine (MUCOMYST) 200 mg/mL nebulizer solution Take 4 mL (800 mg total) by nebulization every 4  (four) hours 180 mL 5   • albuterol (2.5 mg/3 mL) 0.083 % nebulizer solution Take 3 mL (2.5 mg total) by nebulization every 6 (six) hours as needed for wheezing or shortness of breath 1080 mL 0   • albuterol (ProAir HFA) 90 mcg/act inhaler Inhale 2 puffs every 6 (six) hours as needed for wheezing 8.5 g 3   • apixaban (ELIQUIS) 5 mg Take 1 tablet (5 mg total) by mouth 2 (two) times a day 60 tablet 3   • ascorbic acid (VITAMIN C) 500 mg tablet Take 500 mg by mouth daily      • Cartia  MG 24 hr capsule take 1 capsule by mouth twice a day .YOU MAKE TAKE AN ADDITIONAL CAPSULE DAILY IF YOU HAVE PALPITATIONS AND ELEVATED HEART RATE CONSISTENT WITH YOUR A FIB. MAX 3 CAPS DAILY 270 capsule 1   • Cetirizine HCl (ZyrTEC Allergy) 10 MG CAPS Take 10 mg by mouth in the morning     • Cholecalciferol 50 MCG (2000 UT) CAPS Take 2,000 Units by mouth 2 (two) times a day     • Chromium Picolinate (CHROMIUM PICOLATE PO) Take 1 tablet by mouth daily     • DULoxetine (CYMBALTA) 20 mg capsule Take 1 capsule (20 mg total) by mouth daily 30 capsule 0   • ezetimibe (ZETIA) 10 mg tablet take 1 tablet by mouth once daily 90 tablet 3   • flecainide (TAMBOCOR) 100 mg tablet Take 1 tablet (100 mg total) by mouth 2 (two) times a day 75429 tablet 3   • furosemide (LASIX) 20 mg tablet Take 1 tablet (20 mg total) by mouth daily take 2 tablet by mouth daily if needed for shortness of breath WEIGHT GAIN 3 LBS IN 1 DAY OR LOWER LEG SWELLING 90 tablet 3   • gabapentin (NEURONTIN) 300 mg capsule take 1 capsule by mouth at bedtime 90 capsule 1   • losartan (COZAAR) 50 mg tablet take 1 tablet by mouth twice a day 180 tablet 3   • metoprolol succinate (TOPROL-XL) 100 mg 24 hr tablet Take 1 tablet (100 mg total) by mouth every 12 (twelve) hours 180 tablet 3   • sodium chloride 3 % inhalation solution Take 4 mL by nebulization as needed for cough 152 mL 2   • TURMERIC PO Take 1 capsule by mouth 2 (two) times a day     • zolpidem (AMBIEN) 10 mg tablet Take  "1 tablet (10 mg total) by mouth daily at bedtime as needed for sleep 90 tablet 1   • ipratropium (ATROVENT) 0.03 % nasal spray 2 sprays into each nostril 3 (three) times a day Begin once per day, then progress to twice per day. Progress to three times a day as tolerated. (Patient not taking: Reported on 1/31/2024) 90 mL 3   • rOPINIRole (REQUIP) 1 mg tablet Take 2 tablets (2 mg total) by mouth daily at bedtime 180 tablet 1   • Saccharomyces boulardii (Probiotic) 250 MG CAPS Take 1 capsule by mouth daily (Patient not taking: Reported on 3/19/2024)       No current facility-administered medications for this visit.       Allergies  Allergies   Allergen Reactions   • Sotalol Other (See Comments)     Prolonged QTc leading to torsades de pointes    • Penicillins Other (See Comments)     As a child calcium deposit in the arm    • Ace Inhibitors GI Intolerance     Did feel well on it   • Omeprazole Abdominal Pain, Rash and Vomiting     stomach pain, vomiting, rash       Past Medical History, Social History, Family History, medications and allergies were reviewed.    Vitals  Vitals:    03/27/24 1026   BP: 140/78   BP Location: Left arm   Patient Position: Sitting   Cuff Size: Standard   Pulse: 65   SpO2: 97%   Weight: 72.1 kg (159 lb)   Height: 5' 2\" (1.575 m)       Physical Exam  Physical Exam  Vitals reviewed.   Constitutional:       Appearance: She is well-developed.   HENT:      Head: Normocephalic and atraumatic.   Eyes:      Conjunctiva/sclera: Conjunctivae normal.   Cardiovascular:      Rate and Rhythm: Normal rate.   Pulmonary:      Effort: Pulmonary effort is normal.   Abdominal:      Palpations: Abdomen is soft.   Genitourinary:     Comments: Vaginal atrophy  Musculoskeletal:         General: Normal range of motion.   Skin:     General: Skin is warm and dry.   Neurological:      Mental Status: She is alert and oriented to person, place, and time.   Psychiatric:         Mood and Affect: Mood normal. " "          Results  No results found for: \"PSA\"  Lab Results   Component Value Date    GLUCOSE 114 05/06/2015    CALCIUM 9.8 01/22/2024     05/06/2015    K 4.2 01/22/2024    CO2 29 01/22/2024    CL 95 (L) 01/22/2024    BUN 18 01/22/2024    CREATININE 0.77 01/22/2024     Lab Results   Component Value Date    WBC 4.94 01/22/2024    HGB 12.8 01/22/2024    HCT 39.7 01/22/2024    MCV 89 01/22/2024     01/22/2024     "

## 2024-03-27 NOTE — PROGRESS NOTES
3/27/2024    Farnaz Martin  1945  [id number]        Assessment  LUTS  Left renal lesion    Plan  We had extended discussion regarding several issues.  First, seems that she has a UTI once again.  She had Proteus in February.  She did not notice improvement after 1 week of Keflex.  Fortunately, no obvious abnormality on cystoscopy.  Will send 1 week of Bactrim and reevaluate symptoms.    We reviewed her multiple CT results.  Will defer to PCP regarding follow-up for pancreas, borderline iliac lymph node, possibly reactive, pulmonary nodule, external breast lesion which she says is chronic.    We did have an extended discussion regarding finding of left renal mass.  Explained that this is a small renal mass, 1.7 cm, but it is enhancing consistent with malignancy.  Typically these are low-grade malignancies, contained, with minimal risk of metastasis.  Discussed options including observation, percutaneous treatment with cryotherapy, and surgical excision.  Discussed technique, risk and benefits of surgery.  They understand that this is a major procedure in order to dissect out the kidney and its vessels, temporary clamp the renal artery, excise the mass, and perform renorrhaphy to repair the kidney.  This is in a difficult position for standard partial nephrectomy and risks potential total nephrectomy as well as bleeding.    May be candidate for retroperitoneal technique from one of my partners.    Alternatively, may be a good candidate for percutaneous cryotherapy, based on age, and location and size of the mass.  Discussed technique, risk, benefits of this as well.  There is minimal morbidity associated with this other than bleeding and renal vascular issue.  Recovery is minimal.  There is risk of recurrence, but age 79 can be observed or managed.  Finally discussed observation with repeat scan in 6 months.  She would likely require repeat scans for some of the other issues identified.    She would like to  discuss with her daughter and then let us know.  Will also contact PCP Dr. Monsivais.    All questions answered and they agree with the plan.        History of Present Illness  Farnaz is a 78 y.o. female with LUTS including dysuria, urgency, suprapubic pain, cloudy urine. E Coli UTI in September 2023, Proteus February 2024.   Subsequent culture contaminants. She does have microhematuria.    CT with multiple findings:   1.7 cm enhancing mass arising from the posterior medial aspect of the left kidney midpole.  Highly suspicious for malignancy.  Urology evaluation is advised.     Subtle haziness of the perivesicular fat which may represent a mild cystitis in the appropriate clinical setting. No discrete bladder lesions are identified. Correlation with urinalysis is recommended.     Hypodense lesion is present within the pancreatic head measuring up to 2.5 cm with a multicystic appearance, possibly representing a serous cystadenoma. Further characterization with abdominal MRI/MRCP with and without contrast is recommended.     Mildly enlarged left external iliac chain lymph node as described. Node may be reactive in etiology given its distance from the suspected left renal malignancy and the lack of CT evidence of metastatic disease elsewhere within the abdomen pelvis.   Short-term follow-up CT is recommended to ensure stability.     Left lower lobe pulmonary nodule measuring 3.5 mm, stable from cardiac CT of 2/17/2021.     Superficial density is seen measuring 1.5 cm near the inferior portions of the right breast, not appreciably changed from August 2023. Clinical correlation recommended.       Cystoscopy     Date/Time  3/27/2024 10:30 AM     Performed by  Arturo Mcgowan MD   Authorized by  Arturo Mcgowan MD         Procedure Details:  Procedure type: cystoscopy    Additional Procedure Details: Patient was prepped with chlorhexidine and lidocaine jelly.  Flexible cystoscope was placed with some difficulty  due to vaginal atrophy.  Cloudy urine obscures some visibility.  No mucosal abnormality obvious.  No suspicious mass or lesion noted.            Review of Systems  Review of Systems   Constitutional: Negative.    HENT: Negative.     Respiratory: Negative.     Cardiovascular: Negative.    Gastrointestinal: Negative.    Genitourinary:         As per HPI   Musculoskeletal: Negative.    Skin: Negative.    Neurological: Negative.    Hematological: Negative.          Past Medical History  Past Medical History:   Diagnosis Date    Actinic keratosis     last assessed - 06Jun2014    Arthritis     Atrial fibrillation with rapid ventricular response (HCC)     last assessed - 26Apr2016    Basal cell carcinoma     Benign colon polyp     last assessed - 27Apr2015    CHF (congestive heart failure) ([facility]) 06/2022    Disease of thyroid gland     Effusion of knee joint right     Resolved - [id number]    Esophageal reflux     Fibromyalgia     last assessed - [id number]    Fibromyalgia, primary     GERD (gastroesophageal reflux disease)     Hypertension     Palpitations     last assessed - 30Apr2013    Peroneal tendonitis, right     last assessed - 02Oct2013    Right lumbar radiculopathy 03/17/2016    Thyroid cancer (HCC)     Thyroid nodule     Trochanteric bursitis of left hip 03/09/2018       Past Social History  Past Surgical History:   Procedure Laterality Date    CARDIAC ELECTROPHYSIOLOGY STUDY AND ABLATION  08/2022    CATARACT EXTRACTION Bilateral     COLONOSCOPY  03/2018    COLONOSCOPY W/ POLYPECTOMY  2021    repeat in 5 years    EYE SURGERY      HYSTERECTOMY      JOINT REPLACEMENT Left     knee    OOPHORECTOMY      TX NEUROPLASTY &/TRANSPOS MEDIAN NRV CARPAL TUNNE Right 11/14/2019    Procedure: RELEASE CARPAL TUNNEL;  Surgeon: Onesimo Tate MD;  Location: [facility];  Service: Orthopedics    TX TOTAL THYROID LOBECTOMY UNI W/WO ISTHMUSECTOMY Left 12/16/2020    Procedure: Left THYROID LOBECTOMY;  Surgeon: Jhony Bhatt MD;  Location:  [facility];  Service: ENT    RECTAL POLYPECTOMY      REPLACEMENT TOTAL KNEE Left     last assessed - [id number]    TONSILLECTOMY      TOTAL ABDOMINAL HYSTERECTOMY      TUBAL LIGATION      US GUIDED THYROID BIOPSY  10/14/2020       Past Family History  Family History   Problem Relation Age of Onset    Heart disease Mother     Diabetes Mother     Heart disease Father     Coronary artery disease Father     Stroke Father         cerebrovascular accident    Heart attack Father         myocardial infarction    Sudden death Father         scd    Other Family         Back disorder    Coronary artery disease Family     Neuropathy Family     Osteoporosis Family     No Known Problems Daughter     No Known Problems Maternal Grandmother     No Known Problems Maternal Grandfather     No Known Problems Paternal Grandmother     No Known Problems Paternal Grandfather     Cancer Maternal Uncle     Breast cancer Maternal Aunt 65    No Known Problems Son     No Known Problems Maternal Aunt     No Known Problems Maternal Aunt     No Known Problems Maternal Aunt     No Known Problems Paternal Aunt     No Known Problems Paternal Aunt     Anuerysm Neg Hx     Clotting disorder Neg Hx     Arrhythmia Neg Hx     Heart failure Neg Hx        Past Social history  Social History     Socioeconomic History    Marital status:      Spouse name: Not on file    Number of children: Not on file    Years of education: Not on file    Highest education level: Not on file   Occupational History    Not on file   Tobacco Use    Smoking status: Never    Smokeless tobacco: Never   Vaping Use    Vaping status: Never Used   Substance and Sexual Activity    Alcohol use: Not Currently    Drug use: Never    Sexual activity: Not Currently   Other Topics Concern    Not on file   Social History Narrative    Not on file     Social Determinants of Health     Financial Resource Strain: Patient Unable To Answer (12/19/2023)    Overall Financial Resource Strain (CARDIA)      Difficulty of Paying Living Expenses: Patient unable to answer   Food Insecurity: No Food Insecurity (1/5/2024)    Hunger Vital Sign     Worried About Running Out of Food in the Last Year: Never true     Ran Out of Food in the Last Year: Never true   Transportation Needs: No Transportation Needs (1/5/2024)    PRAPARE - Transportation     Lack of Transportation (Medical): No     Lack of Transportation (Non-Medical): No   Physical Activity: Not on file   Stress: Not on file   Social Connections: Not on file   Intimate Partner Violence: Not At Risk (7/31/2023)    Received from [facility]    Humiliation, Afraid, Rape, and Kick questionnaire     Fear of Current or Ex-Partner: No     Emotionally Abused: No     Physically Abused: No     Sexually Abused: No   Housing Stability: Low Risk  (1/5/2024)    Housing Stability Vital Sign     Unable to Pay for Housing in the Last Year: No     Number of Places Lived in the Last Year: 1     Unstable Housing in the Last Year: No     Social History     Tobacco Use   Smoking Status Never   Smokeless Tobacco Never       Current Medications  Current Outpatient Medications   Medication Sig Dispense Refill    acetylcysteine (MUCOMYST) 200 mg/mL nebulizer solution Take 4 mL (800 mg total) by nebulization every 4 (four) hours 180 mL 5    albuterol (2.5 mg/3 mL) 0.083 % nebulizer solution Take 3 mL (2.5 mg total) by nebulization every 6 (six) hours as needed for wheezing or shortness of breath 1080 mL 0    albuterol (ProAir HFA) 90 mcg/act inhaler Inhale 2 puffs every 6 (six) hours as needed for wheezing 8.5 g 3    apixaban (ELIQUIS) 5 mg Take 1 tablet (5 mg total) by mouth 2 (two) times a day 60 tablet 3    ascorbic acid (VITAMIN C) 500 mg tablet Take 500 mg by mouth daily       Cartia  MG 24 hr capsule take 1 capsule by mouth twice a day .YOU MAKE TAKE AN ADDITIONAL CAPSULE DAILY IF YOU HAVE PALPITATIONS AND ELEVATED HEART RATE CONSISTENT WITH YOUR A FIB. MAX 3  CAPS DAILY 270 capsule 1    Cetirizine HCl (ZyrTEC Allergy) 10 MG CAPS Take 10 mg by mouth in the morning      Cholecalciferol 50 MCG (2000 UT) CAPS Take 2,000 Units by mouth 2 (two) times a day      Chromium Picolinate (CHROMIUM PICOLATE PO) Take 1 tablet by mouth daily      DULoxetine (CYMBALTA) 20 mg capsule Take 1 capsule (20 mg total) by mouth daily 30 capsule 0    ezetimibe (ZETIA) 10 mg tablet take 1 tablet by mouth once daily 90 tablet 3    flecainide (TAMBOCOR) 100 mg tablet Take 1 tablet (100 mg total) by mouth 2 (two) times a day 03535 tablet 3    furosemide (LASIX) 20 mg tablet Take 1 tablet (20 mg total) by mouth daily take 2 tablet by mouth daily if needed for shortness of breath WEIGHT GAIN 3 LBS IN 1 DAY OR LOWER LEG SWELLING 90 tablet 3    gabapentin (NEURONTIN) 300 mg capsule take 1 capsule by mouth at bedtime 90 capsule 1    losartan (COZAAR) 50 mg tablet take 1 tablet by mouth twice a day 180 tablet 3    metoprolol succinate (TOPROL-XL) 100 mg 24 hr tablet Take 1 tablet (100 mg total) by mouth every 12 (twelve) hours 180 tablet 3    sodium chloride 3 % inhalation solution Take 4 mL by nebulization as needed for cough 152 mL 2    TURMERIC PO Take 1 capsule by mouth 2 (two) times a day      zolpidem (AMBIEN) 10 mg tablet Take 1 tablet (10 mg total) by mouth daily at bedtime as needed for sleep 90 tablet 1    ipratropium (ATROVENT) 0.03 % nasal spray 2 sprays into each nostril 3 (three) times a day Begin once per day, then progress to twice per day. Progress to three times a day as tolerated. (Patient not taking: Reported on 1/31/2024) 90 mL 3    rOPINIRole (REQUIP) 1 mg tablet Take 2 tablets (2 mg total) by mouth daily at bedtime 180 tablet 1    Saccharomyces boulardii (Probiotic) 250 MG CAPS Take 1 capsule by mouth daily (Patient not taking: Reported on 3/19/2024)       No current facility-administered medications for this visit.       Allergies  Allergies   Allergen Reactions    Sotalol Other  "(See Comments)     Prolonged QTc leading to torsades de pointes     Penicillins Other (See Comments)     As a child calcium deposit in the arm     Ace Inhibitors GI Intolerance     Did feel well on it    Omeprazole Abdominal Pain, Rash and Vomiting     stomach pain, vomiting, rash       Past Medical History, Social History, Family History, medications and allergies were reviewed.    Vitals  Vitals:    03/27/24 1026   BP: 140/78   BP Location: Left arm   Patient Position: Sitting   Cuff Size: Standard   Pulse: 65   SpO2: 97%   Weight: 72.1 kg (159 lb)   Height: 5' 2\" (1.575 m)       Physical Exam  Physical Exam  Vitals reviewed.   Constitutional:       Appearance: She is well-developed.   HENT:      Head: Normocephalic and atraumatic.   Eyes:      Conjunctiva/sclera: Conjunctivae normal.   Cardiovascular:      Rate and Rhythm: Normal rate.   Pulmonary:      Effort: Pulmonary effort is normal.   Abdominal:      Palpations: Abdomen is soft.   Genitourinary:     Comments: Vaginal atrophy  Musculoskeletal:         General: Normal range of motion.   Skin:     General: Skin is warm and dry.   Neurological:      Mental Status: She is alert and oriented to person, place, and time.   Psychiatric:         Mood and Affect: Mood normal.           Results  No results found for: \"PSA\"  Lab Results   Component Value Date    GLUCOSE 114 05/06/2015    CALCIUM 9.8 01/22/2024     05/06/2015    K 4.2 01/22/2024    CO2 29 01/22/2024    CL 95 (L) 01/22/2024    BUN 18 01/22/2024    CREATININE 0.77 01/22/2024     Lab Results   Component Value Date    WBC 4.94 01/22/2024    HGB 12.8 01/22/2024    HCT 39.7 01/22/2024    MCV 89 01/22/2024     01/22/2024           "

## 2024-03-28 ENCOUNTER — OFFICE VISIT (OUTPATIENT)
Dept: FAMILY MEDICINE CLINIC | Facility: CLINIC | Age: 79
End: 2024-03-28
Payer: MEDICARE

## 2024-03-28 VITALS
WEIGHT: 157 LBS | DIASTOLIC BLOOD PRESSURE: 78 MMHG | HEIGHT: 62 IN | TEMPERATURE: 97.8 F | OXYGEN SATURATION: 99 % | BODY MASS INDEX: 28.89 KG/M2 | RESPIRATION RATE: 18 BRPM | SYSTOLIC BLOOD PRESSURE: 138 MMHG | HEART RATE: 64 BPM

## 2024-03-28 DIAGNOSIS — J47.9 BRONCHIECTASIS WITHOUT COMPLICATION (HCC): ICD-10-CM

## 2024-03-28 DIAGNOSIS — N28.89 LEFT RENAL MASS: ICD-10-CM

## 2024-03-28 DIAGNOSIS — R31.29 MICROSCOPIC HEMATURIA: ICD-10-CM

## 2024-03-28 DIAGNOSIS — R93.5 ABNORMAL CT OF THE ABDOMEN: Primary | ICD-10-CM

## 2024-03-28 PROBLEM — R00.0 SINUS TACHYCARDIA: Status: RESOLVED | Noted: 2024-01-05 | Resolved: 2024-03-28

## 2024-03-28 LAB — BACTERIA UR CULT: ABNORMAL

## 2024-03-28 PROCEDURE — 99214 OFFICE O/P EST MOD 30 MIN: CPT | Performed by: FAMILY MEDICINE

## 2024-03-28 PROCEDURE — G2211 COMPLEX E/M VISIT ADD ON: HCPCS | Performed by: FAMILY MEDICINE

## 2024-03-28 NOTE — TELEPHONE ENCOUNTER
LM for Farnaz that she should  take the antibiotic with food and drink plenty of water with it. If she is still unable to tolerate  she should call us back

## 2024-03-28 NOTE — TELEPHONE ENCOUNTER
Pt returned call regarding previous message.  Pt sts she will try recommendations regarding ABX and then call back if she is still not able to tolerate.  Pt sts that she would like to set a phone call up between the pt, her daughter, and Dr. Mcgowan.  Pt sts that daughter was unable to come to her appt on 3/27/24 and she would like to have Dr. Mcgowan speak with the both of them and go over everything that was discuss in the appt.  Pt sts that her daughter is usually available on Monday and Friday.  Please review and advise.    Pt call back: [phone]

## 2024-03-28 NOTE — TELEPHONE ENCOUNTER
Pt called and requesting a call back from provider so that the pt and her daughter can further discuss what was discussed at the appt on 3/27/24. Pt stated she took 1 dose of ulfamethoxazole-trimethoprim (BACTRIM DS) 800-160 mg  around 5:00pm and then around 9:00pm the pt vomited and she is wondering if that was a side effect from the medication. Pt will wait to take her morning dose to see if the vomiting was from the medication     Pt call back-[phone]

## 2024-03-28 NOTE — TELEPHONE ENCOUNTER
I attached the note its in epic under letters    Under communication  It should be in your task bucket   Let me know if you need anything else :) [facility]  Kvng Thomas MD  [phone]            COLON DISCHARGE INSTRUCTIONS       3/28/2024    Delphine Burgos  :  1956  [facility] Medical Record Number:  [id number]      

## 2024-03-28 NOTE — PROGRESS NOTES
Name: Farnaz Martin      : 1945      MRN: [id number]  Encounter Provider: Naveen Monsivais MD  Encounter Date: 3/28/2024   Encounter department: [facility]    Assessment & Plan     1. Abnormal CT of the abdomen-pancreas lesion  -     MRI abdomen w wo contrast and mrcp; Future; Expected date: 2024    2. Left renal mass    3. Microscopic hematuria    4. Bronchiectasis without complication (HCC)    MRI/MRCP to evaluate pancreatic lesion. Follow up with Urology re L renal mass. Patient leaning toward cryo ablation procedure         Subjective     Follow up OV to review testing. Here with her daughter. Recent Urology evaluation for microscopic hematuria. Cystoscopy normal. CT renal protocol 1.7 cm enhancing mass arising from the posterior medial aspect of the left kidney midpole highly suspicious for malignancy.  Subtle haziness of perivesicular fat which may represent a mild cystitis.  No discrete bladder lesions.  Hypodense lesion present within the pancreatic head measuring up to 2.5 cm with a multicystic appearance possibly representing a serocystadenoma.  Mildly enlarged left external iliac chain lymph node.  Left lower lobe pulmonary nodule 3.5 mm stable from cardiac CT 2021.  Superficial density seen measuring 1.5 cm near the inferior portion of right breast not appreciably changed from 2023.  Mammogram 3/2024 normal    Appetite normal. No fevers, night sweats or weight loss. 2024 LFTs normal except for total bilirubin 1.03    S/p cystoscopy. Intermittent dysuria. No gross hematuria. Abnormal UA on antibiotic pending culture/ 2024 urine culture + Proteus       Type 2 DM diet controlled.  2024 A1c  7.6 increased from 6.6. Urine albumin 21.2 on ARB. No DPN. Current with eye exam. Hypertension  blood pressures have been stable on Losartan 50 mg BID, Metoprolol  mg BID,  Amlodipine 5 mg/day and Cardizem  mg BID. 2024 creatinine 0.78. GFR 72. Electrolytes  normal except for Na+   08/2023 Hgb 12.0.  06/2023 creatinine 0.88. GFR 63.  Hyperlipidemia on Zetia 10 mg daily. 07/2023 Cholesterol 163.  Triglycerides 68.  HDL 53.  LDL 96.        01/2024 admission 01/05/2024 to 01/06/2024 Dx palpitations associated with shortness of breath. History of AF w/ 2 prior ablations-s/p pacemaker.  Device interrogation 1/5/2024; sinus tach, AP 91%,  7% normal device function. Admission EKG Electronic ventricular pacemaker.Troponin negative. CTA no PE.  CBC normal. Hgb 12.5. CMP normal except for Na+ 129 and chloride 93. LFTs normal. TSH 3.644. Sinus tachycardia fluid responsive. She advised at discharge to use additional once per day Cardizem  mg as needed for tachycardia. Repeat out patient sodium 128. . Corrected Na+ 129. Now on fluid restriction.       Type 2 DM diet controlled.  01/2024 A1c  7.6 increased from 6.6. Urine albumin 21.2 on ARB. No DPN. Current with eye exam. Hypertension  blood pressures have been stable on Losartan 50 mg BID, Metoprolol  mg BID,  Amlodipine 5 mg/day and Cardizem  mg BID. 01/2024 creatinine 0.78. GFR 72. Electrolytes normal except for Na+   08/2023 Hgb 12.0.  06/2023 creatinine 0.88. GFR 63.  Hyperlipidemia on Zetia 10 mg daily. 07/2023 Cholesterol 163.  Triglycerides 68.  HDL 53.  LDL 96.           08/2023 admission Diagnosis atrial fibrillation with rapid ventricular response.  2 prior ablations with pacemaker.  Chronic diastolic CHF, acute blood loss secondary to recent left TKR and hyponatremia. 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  CTA no acute PE.  Mild heterogeneous liver with slight lobulation along the left lobe.  Incidental thyroid nodule.  Venous Dopplers lower extremities no DVT.  Abdominal ultrasound hepatomegaly.  No evidence of cirrhosis or liver mass.  6 mm gallbladder polyp.  Troponin negative.  Elevated BNP at 651.  TSH 2.439.  Admission electrolytes normal for sodium 132.  LFTs  normal except for total bilirubin 1.07.  Hemoglobin 10.0.  Patient was treated with IV Cardizem and started on oral Flecainide.  She ultimately underwent cardioversion.  Preprocedure SHANNAN Normal left ventricular systolic function.  EF 65%.  Wall motion normal.  Right ventricle normal.  Left atrium moderately dilated.  No thrombus.  No PFO.  Left atrial appendage dilated.  Normal function.  No thrombus.  Moderate TR.     06/2023 Echo  Left Ventricle: Left ventricular cavity size is normal. Wall thickness is normal. The left ventricular ejection fraction is 55%. Systolic function is normal. Wall motion is normal. Diastolic function is moderately abnormal, consistent with grade II (pseudonormal) relaxation.  ·  Right Ventricle: Right ventricular cavity size is normal. Systolic function is normal. A pacer wire is present.  ·  Left Atrium: The atrium is mildly dilated.  ·  Aortic Valve: There is mild regurgitation.  ·  Mitral Valve: There is moderate regurgitation.  ·  Tricuspid Valve: There is mild to moderate regurgitation. The right ventricular systolic pressure is moderately to severely elevated. The estimated right ventricular systolic pressure is 64.00 mmHg.           Review of Systems   Constitutional:  Negative for appetite change, chills and unexpected weight change.   Respiratory:  Positive for cough (chronic).         History of bronchiectasis. Seen and evaluated by Pulmonary Medicine    Gastrointestinal:  Negative for abdominal pain, diarrhea, nausea and vomiting.   Genitourinary:  Positive for dysuria. Negative for flank pain, frequency and hematuria.   Hematological:  Negative for adenopathy.       Past Medical History:   Diagnosis Date    Actinic keratosis     last assessed - 06Jun2014    Arthritis     Atrial fibrillation with rapid ventricular response (HCC)     last assessed - 26Apr2016    Basal cell carcinoma     Benign colon polyp     last assessed - 27Apr2015    CHF (congestive heart failure) (HCC)  06/2022    Disease of thyroid gland     Effusion of knee joint right     Resolved - [id number]    Esophageal reflux     Fibromyalgia     last assessed - [id number]    Fibromyalgia, primary     GERD (gastroesophageal reflux disease)     Hypertension     Palpitations     last assessed - [id number]    Peroneal tendonitis, right     last assessed - [id number]    Right lumbar radiculopathy 03/17/2016    Thyroid cancer (HCC)     Thyroid nodule     Trochanteric bursitis of left hip 03/09/2018     Past Surgical History:   Procedure Laterality Date    CARDIAC ELECTROPHYSIOLOGY STUDY AND ABLATION  08/2022    CATARACT EXTRACTION Bilateral     COLONOSCOPY  03/2018    COLONOSCOPY W/ POLYPECTOMY  2021    repeat in 5 years    EYE SURGERY      HYSTERECTOMY      JOINT REPLACEMENT Left     knee    OOPHORECTOMY      AZ NEUROPLASTY &/TRANSPOS MEDIAN NRV CARPAL TUNNE Right 11/14/2019    Procedure: RELEASE CARPAL TUNNEL;  Surgeon: Onesimo Tate MD;  Location: [facility];  Service: Orthopedics    AZ TOTAL THYROID LOBECTOMY UNI W/WO ISTHMUSECTOMY Left 12/16/2020    Procedure: Left THYROID LOBECTOMY;  Surgeon: Jhony Bhatt MD;  Location: [facility];  Service: ENT    RECTAL POLYPECTOMY      REPLACEMENT TOTAL KNEE Left     last assessed - 27Apr2015    TONSILLECTOMY      TOTAL ABDOMINAL HYSTERECTOMY      TUBAL LIGATION      US GUIDED THYROID BIOPSY  10/14/2020     Family History   Problem Relation Age of Onset    Heart disease Mother     Diabetes Mother     Heart disease Father     Coronary artery disease Father     Stroke Father         cerebrovascular accident    Heart attack Father         myocardial infarction    Sudden death Father         scd    Other Family         Back disorder    Coronary artery disease Family     Neuropathy Family     Osteoporosis Family     No Known Problems Daughter     No Known Problems Maternal Grandmother     No Known Problems Maternal Grandfather     No Known Problems Paternal Grandmother     No Known Problems  Paternal Grandfather     Cancer Maternal Uncle     Breast cancer Maternal Aunt 65    No Known Problems Son     No Known Problems Maternal Aunt     No Known Problems Maternal Aunt     No Known Problems Maternal Aunt     No Known Problems Paternal Aunt     No Known Problems Paternal Aunt     Anuerysm Neg Hx     Clotting disorder Neg Hx     Arrhythmia Neg Hx     Heart failure Neg Hx      Social History     Socioeconomic History    Marital status:      Spouse name: None    Number of children: None    Years of education: None    Highest education level: None   Occupational History    None   Tobacco Use    Smoking status: Never    Smokeless tobacco: Never   Vaping Use    Vaping status: Never Used   Substance and Sexual Activity    Alcohol use: Not Currently    Drug use: Never    Sexual activity: Not Currently   Other Topics Concern    None   Social History Narrative    None     Social Determinants of Health     Financial Resource Strain: Patient Unable To Answer (12/19/2023)    Overall Financial Resource Strain (CARDIA)     Difficulty of Paying Living Expenses: Patient unable to answer   Food Insecurity: No Food Insecurity (1/5/2024)    Hunger Vital Sign     Worried About Running Out of Food in the Last Year: Never true     Ran Out of Food in the Last Year: Never true   Transportation Needs: No Transportation Needs (1/5/2024)    PRAPARE - Transportation     Lack of Transportation (Medical): No     Lack of Transportation (Non-Medical): No   Physical Activity: Not on file   Stress: Not on file   Social Connections: Not on file   Intimate Partner Violence: Not At Risk (7/31/2023)    Received from [facility]    Humiliation, Afraid, Rape, and Kick questionnaire     Fear of Current or Ex-Partner: No     Emotionally Abused: No     Physically Abused: No     Sexually Abused: No   Housing Stability: Low Risk  (1/5/2024)    Housing Stability Vital Sign     Unable to Pay for Housing in the Last Year: No      Number of Places Lived in the Last Year: 1     Unstable Housing in the Last Year: No     Current Outpatient Medications on File Prior to Visit   Medication Sig    acetylcysteine (MUCOMYST) 200 mg/mL nebulizer solution Take 4 mL (800 mg total) by nebulization every 4 (four) hours    albuterol (2.5 mg/3 mL) 0.083 % nebulizer solution Take 3 mL (2.5 mg total) by nebulization every 6 (six) hours as needed for wheezing or shortness of breath    albuterol (ProAir HFA) 90 mcg/act inhaler Inhale 2 puffs every 6 (six) hours as needed for wheezing    apixaban (ELIQUIS) 5 mg Take 1 tablet (5 mg total) by mouth 2 (two) times a day    ascorbic acid (VITAMIN C) 500 mg tablet Take 500 mg by mouth daily     Cartia  MG 24 hr capsule take 1 capsule by mouth twice a day .YOU MAKE TAKE AN ADDITIONAL CAPSULE DAILY IF YOU HAVE PALPITATIONS AND ELEVATED HEART RATE CONSISTENT WITH YOUR A FIB. MAX 3 CAPS DAILY    Cetirizine HCl (ZyrTEC Allergy) 10 MG CAPS Take 10 mg by mouth in the morning    Cholecalciferol 50 MCG (2000 UT) CAPS Take 2,000 Units by mouth 2 (two) times a day    Chromium Picolinate (CHROMIUM PICOLATE PO) Take 1 tablet by mouth daily    DULoxetine (CYMBALTA) 20 mg capsule Take 1 capsule (20 mg total) by mouth daily    ezetimibe (ZETIA) 10 mg tablet take 1 tablet by mouth once daily    flecainide (TAMBOCOR) 100 mg tablet Take 1 tablet (100 mg total) by mouth 2 (two) times a day    furosemide (LASIX) 20 mg tablet Take 1 tablet (20 mg total) by mouth daily take 2 tablet by mouth daily if needed for shortness of breath WEIGHT GAIN 3 LBS IN 1 DAY OR LOWER LEG SWELLING    gabapentin (NEURONTIN) 300 mg capsule take 1 capsule by mouth at bedtime    losartan (COZAAR) 50 mg tablet take 1 tablet by mouth twice a day    metoprolol succinate (TOPROL-XL) 100 mg 24 hr tablet Take 1 tablet (100 mg total) by mouth every 12 (twelve) hours    rOPINIRole (REQUIP) 1 mg tablet Take 2 tablets (2 mg total) by mouth daily at bedtime    sodium  chloride 3 % inhalation solution Take 4 mL by nebulization as needed for cough    sulfamethoxazole-trimethoprim (BACTRIM DS) 800-160 mg per tablet Take 1 tablet by mouth 2 (two) times a day for 7 days    TURMERIC PO Take 1 capsule by mouth 2 (two) times a day    zolpidem (AMBIEN) 10 mg tablet Take 1 tablet (10 mg total) by mouth daily at bedtime as needed for sleep    ipratropium (ATROVENT) 0.03 % nasal spray 2 sprays into each nostril 3 (three) times a day Begin once per day, then progress to twice per day. Progress to three times a day as tolerated. (Patient not taking: Reported on 1/31/2024)    Saccharomyces boulardii (Probiotic) 250 MG CAPS Take 1 capsule by mouth daily (Patient not taking: Reported on 3/19/2024)     Allergies   Allergen Reactions    Sotalol Other (See Comments)     Prolonged QTc leading to torsades de pointes     Penicillins Other (See Comments)     As a child calcium deposit in the arm     Ace Inhibitors GI Intolerance     Did feel well on it    Omeprazole Abdominal Pain, Rash and Vomiting     stomach pain, vomiting, rash     Immunization History   Administered Date(s) Administered    COVID-19 MODERNA VACC 0.5 ML IM 01/27/2021, 01/27/2021, 02/24/2021, 02/24/2021, 11/23/2021    COVID-19 Moderna Vac BIVALENT 12 Yr+ IM 0.5 ML 12/09/2022    COVID-19 Moderna mRNA Vaccine 12 Yr+ 50 mcg/0.5 mL (Spikevax) 01/02/2024    INFLUENZA 12/23/2015, 12/23/2015, 11/07/2016, 11/07/2016, 10/18/2017, 10/18/2017, 12/04/2018, 12/04/2018, 09/26/2019, 09/08/2020, 11/03/2021, 09/19/2022, 09/19/2022, 12/19/2023    Influenza Split High Dose Preservative Free IM 10/01/2014, 12/23/2015, 11/07/2016, 10/18/2017    Influenza, high dose seasonal 0.7 mL 12/04/2018, 09/26/2019, 09/08/2020, 11/03/2021, 09/19/2022, 12/19/2023    Influenza, seasonal, injectable 10/16/2012    Pneumococcal Conjugate 13-Valent 04/27/2015    Pneumococcal Polysaccharide PPV23 03/29/2022, 03/29/2022    Respiratory Syncytial Virus Vaccine (Recombinant)  "02/21/2024       Objective     /78 (BP Location: Left arm, Patient Position: Sitting, Cuff Size: Standard)   Pulse 64   Temp 97.8 °F (36.6 °C)   Resp 18   Ht 5' 2\" (1.575 m)   Wt 71.2 kg (157 lb)   LMP 02/01/1990 (Within Weeks)   SpO2 99%   BMI 28.72 kg/m²      BP Readings from Last 3 Encounters:   03/28/24 138/78   03/27/24 140/78   03/19/24 140/90      Wt Readings from Last 3 Encounters:   03/28/24 71.2 kg (157 lb)   03/27/24 72.1 kg (159 lb)   03/19/24 72.5 kg (159 lb 12.8 oz)            Physical Exam  Constitutional:       General: She is not in acute distress.  Eyes:      General: No scleral icterus.     Conjunctiva/sclera: Conjunctivae normal.   Neck:      Thyroid: No thyroid mass or thyromegaly.      Vascular: Normal carotid pulses. No carotid bruit or JVD.   Cardiovascular:      Rate and Rhythm: Normal rate and regular rhythm.      Heart sounds: No murmur heard.     No gallop.   Pulmonary:      Effort: Pulmonary effort is normal.      Breath sounds: Normal breath sounds.   Abdominal:      General: Bowel sounds are normal. There is no distension.      Palpations: Abdomen is soft. There is no hepatomegaly, splenomegaly or mass.      Tenderness: There is no abdominal tenderness. There is no guarding or rebound.   Musculoskeletal:      Right lower leg: No edema.      Left lower leg: No edema.   Lymphadenopathy:      Cervical: No cervical adenopathy.   Neurological:      Mental Status: She is alert and oriented to person, place, and time.   Psychiatric:         Mood and Affect: Mood normal.       Naveen Monsivais MD    "

## 2024-03-29 ENCOUNTER — TELEPHONE (OUTPATIENT)
Age: 79
End: 2024-03-29

## 2024-03-29 NOTE — TELEPHONE ENCOUNTER
Patient called in again regarding this matter. She would like a call back today so she knows what to do for the weekend. Please advise.

## 2024-03-29 NOTE — TELEPHONE ENCOUNTER
Pt calling in stating she has been coughing to the point of gagging and vomiting after using her nebulizer. It seems to be happening more so after her evening treatments. She is unsure if she should continue the medication or stop.

## 2024-03-30 NOTE — TELEPHONE ENCOUNTER
Called pt back    Pt reports persistent cough, also nausea that happens after mucomyst but not after saline. Was able to get nebulized saline    I r4ec to stop mucomyst for now and use only albuterol followed by nasal saline BID for airway clearance. No other abd pain and no nausea this morning when she skipped a dose of her mucous clearance

## 2024-04-01 DIAGNOSIS — M79.7 FIBROMYALGIA: ICD-10-CM

## 2024-04-01 RX ORDER — DULOXETIN HYDROCHLORIDE 20 MG/1
20 CAPSULE, DELAYED RELEASE ORAL DAILY
Qty: 30 CAPSULE | Refills: 5 | Status: SHIPPED | OUTPATIENT
Start: 2024-04-01

## 2024-04-01 NOTE — TELEPHONE ENCOUNTER
Pt called stated that before she decide on which treatment she wants her daughter would like to speak with  before she decide.     Daughter Coral can be reached at [phone]

## 2024-04-02 LAB — MYCOBACTERIUM SPEC CULT: NORMAL

## 2024-04-04 NOTE — TELEPHONE ENCOUNTER
I spoke with Coral. They would like to pursue cryotherapy for left renal mass.  Referral to interventional radiology placed.

## 2024-04-09 LAB — MYCOBACTERIUM SPEC CULT: NORMAL

## 2024-04-16 ENCOUNTER — NURSE TRIAGE (OUTPATIENT)
Age: 79
End: 2024-04-16

## 2024-04-16 ENCOUNTER — HOSPITAL ENCOUNTER (OUTPATIENT)
Dept: RADIOLOGY | Facility: HOSPITAL | Age: 79
Discharge: HOME | End: 2024-04-16
Payer: MEDICARE

## 2024-04-16 DIAGNOSIS — R93.5 ABNORMAL CT OF THE ABDOMEN: ICD-10-CM

## 2024-04-16 LAB — MYCOBACTERIUM SPEC CULT: NORMAL

## 2024-04-16 PROCEDURE — A9585 GADOBUTROL INJECTION: HCPCS | Performed by: FAMILY MEDICINE

## 2024-04-16 PROCEDURE — 74183 MRI ABD W/O CNTR FLWD CNTR: CPT

## 2024-04-16 RX ORDER — GADOBUTROL 604.72 MG/ML
7 INJECTION INTRAVENOUS
Status: COMPLETED | OUTPATIENT
Start: 2024-04-16 | End: 2024-04-16

## 2024-04-16 RX ADMIN — GADOBUTROL 7 ML: 604.72 INJECTION INTRAVENOUS at 13:12

## 2024-04-16 NOTE — TELEPHONE ENCOUNTER
"Pt called stating that yesterday she started with afib like symptoms, she states she has fluttering, pt states she also has felt hot over the past 2 days. She states yesterday she felt \"wiped out\" but today she does not feel that way. She said she had an MRI today and was told she is in afib.  Pt states that she noticed she forgot to take her Angle unsure of how many days she miss. Pt did resume medication this morning.     Is there anything else patient should do at this time?    Please advise   Reason for Disposition   Heart rhythm alert (e.g., 'you have irregular heartbeat') from personal wearable device (e.g., Apple Watch)    Answer Assessment - Initial Assessment Questions  1. DESCRIPTION: \"Please describe your heart rate or heartbeat that you are having\" (e.g., fast/slow, regular/irregular, skipped or extra beats, \"palpitations\")      Pt states she feels fluttering, went for MRI today and was told she is in Afib, Pt states she also missed doses of cartia and unsure how many  2. ONSET: \"When did it start?\" (Minutes, hours or days)       Yesterday all day , move around would feel fluttering, felt wiped out  3. DURATION: \"How long does it last\" (e.g., seconds, minutes, hours)      All day, feels fluttering today but does not feel wiped out   4. PATTERN \"Does it come and go, or has it been constant since it started?\"  \"Does it get worse with exertion?\"   \"Are you feeling it now?\"      Comes and goes   6. HEART RATE: \"Can you tell me your heart rate?\" \"How many beats in 15 seconds?\"  (Note: not all patients can do this)        P95 /81  7. RECURRENT SYMPTOM: \"Have you ever had this before?\" If Yes, ask: \"When was the last time?\" and \"What happened that time?\"       Yes treated with medication   8. CAUSE: \"What do you think is causing the palpitations?\"      Missed medication   9. CARDIAC HISTORY: \"Do you have any history of heart disease?\" (e.g., heart attack, angina, bypass surgery, angioplasty, arrhythmia) " "      Yes afib   10. OTHER SYMPTOMS: \"Do you have any other symptoms?\" (e.g., dizziness, chest pain, sweating, difficulty breathing)        No    Protocols used: Heart Rate and Heartbeat Questions-ADULT-[place]    "

## 2024-04-16 NOTE — TELEPHONE ENCOUNTER
Regarding: AFIB/MISS DOSAGE OF MEDS  ----- Message from Deborah Lord sent at 4/16/2024  2:35 PM EDT -----  Patient called expressing she has a afib problem, she also missed doses of her meds, feels a bit of fluttering, she had an MRI today and that when they saw the AFIB.

## 2024-04-16 NOTE — NURSING NOTE
Device interrogation for MRI.  Normal device function prior to MRI.  Leads and device meet all requirements per policy for MRI.  Device programmed ODO per Cardiology for MRI.  Patient has no complaints.  Vital signs monitored throughout by ABHIJEET Casanova RN.  Normal device function post MRI.  Device reprogrammed to prior settings per Cardiology.

## 2024-04-16 NOTE — NURSING NOTE
[employer] device interrogation for MRI done by SPRING Roque clinical specialist. Device set to MRI safe mode--turned OFF.    Pt has B/L hearing aids. Safely stored.    MRI abdomen w/without contrast and mrcp complete. Pt tolerated well.     VSS throughout scan. Pt alert and oriented on room air. No complaints or visible signs of distress.    Device reprogrammed to prior settings per Cardiology by MACK Lin RN.

## 2024-04-22 ENCOUNTER — TELEMEDICINE (OUTPATIENT)
Dept: INTERVENTIONAL RADIOLOGY/VASCULAR | Facility: CLINIC | Age: 79
End: 2024-04-22
Payer: MEDICARE

## 2024-04-22 DIAGNOSIS — N28.89 RENAL MASS, LEFT: ICD-10-CM

## 2024-04-22 DIAGNOSIS — N28.89 LEFT RENAL MASS: Primary | ICD-10-CM

## 2024-04-22 PROCEDURE — 99203 OFFICE O/P NEW LOW 30 MIN: CPT | Performed by: RADIOLOGY

## 2024-04-22 NOTE — LETTER
April 22, 2024       No Recipients    Patient: Farnaz Martin   YOB: 1945   Date of Visit: 4/22/2024       Dear Dr. Mcgowan:    Thank you for referring Farnaz Martin to me for evaluation. Below are my notes for this consultation.    If you have questions, please do not hesitate to call me. I look forward to following your patient along with you.         Sincerely,        Anna Grullon MD        CC:   No Recipients    Anna Grullon MD  4/22/2024 12:13 PM  Incomplete  Virtual Regular Visit    Verification of patient location:    Patient is located at Home in the following state in which I hold an active license [place]      Assessment/Plan:    Problem List Items Addressed This Visit          Urinary    Left renal mass - Primary     I had a pleasant conversation with the patient and her son-in-law through a video virtual visit today.    I showed them the CAT scan findings of a left renal mass.  The mass measures 1.9 to 2.0 cm in size.  We reviewed various treatment options with emphasis on minimally invasive therapy.  The imaging features are concerning for T1a renal cell carcinoma.  We discussed that both active surveillance and cryoablation are reasonable options at this point.  I went on to describe what cryoablation is by showing them a video animation with schematics.  We talked about risk versus benefit.      Her son-in-law raised the question that given her co-morbidities whether active surveillance would be favored at this point.  I acknowledged that it is reasonable to obtain follow-up imaging at another time point to assess growth rate.  We talked about the risk versus benefit of this approach as well.  They went on to ask the chance of metastasis and tumor significantly affecting renal function for which I informed them that the probability is very low given the small size of this tumor.      After our discussion, the patient elected to obtain a follow-up scan in 3 months and we will reconvene  to discuss the results and plan moving forward.    -CT abdomen with contrast ordered and to be done in 3 months.  -Follow-up virtual visit in 3 to 4 months after the scan is done.              Other Visit Diagnoses       Renal mass, left        Relevant Orders    CT abdomen w contrast                 Reason for visit is   Chief Complaint   Patient presents with   • Virtual Regular Visit          Encounter provider Anna Grullon MD    Provider located at [facility] INTERVENTIONAL RADIOLOGY [place]  [address]  [address]  [facility] [id number] [phone]      Recent Visits  No visits were found meeting these conditions.  Showing recent visits within past 7 days and meeting all other requirements  Today's Visits  Date Type Provider Dept   04/22/24 Telemedicine Anna Grullon MD [facility]   Showing today's visits and meeting all other requirements  Future Appointments  No visits were found meeting these conditions.  Showing future appointments within next 150 days and meeting all other requirements       The patient was identified by name and date of birth. Farnaz Martin was informed that this is a telemedicine visit and that the visit is being conducted through the Epic Embedded platform. She agrees to proceed..  My office door was closed. No one else was in the room.  She acknowledged consent and understanding of privacy and security of the video platform. The patient has agreed to participate and understands they can discontinue the visit at any time.    Patient is aware this is a billable service.     Subjective  Farnaz Martin is a 78 y.o. female with left renal mass.      HPI     Past Medical History:   Diagnosis Date   • Actinic keratosis     last assessed - 06Jun2014   • Arthritis    • Atrial fibrillation with rapid ventricular response (HCC)     last assessed - 26Apr2016   • Basal cell carcinoma    • Benign colon polyp     last assessed - 27Apr2015   • CHF (congestive heart failure)  ([facility]) 06/2022   • Disease of thyroid gland    • Effusion of knee joint right     Resolved - 19Apr2016   • Esophageal reflux    • Fibromyalgia     last assessed - [id number]   • Fibromyalgia, primary    • GERD (gastroesophageal reflux disease)    • Hypertension    • Palpitations     last assessed - 30Apr2013   • Peroneal tendonitis, right     last assessed - 02Oct2013   • Right lumbar radiculopathy 03/17/2016   • Thyroid cancer ([facility])    • Thyroid nodule    • Trochanteric bursitis of left hip 03/09/2018       Past Surgical History:   Procedure Laterality Date   • CARDIAC ELECTROPHYSIOLOGY STUDY AND ABLATION  08/2022   • CATARACT EXTRACTION Bilateral    • COLONOSCOPY  03/2018   • COLONOSCOPY W/ POLYPECTOMY  2021    repeat in 5 years   • EYE SURGERY     • HYSTERECTOMY     • JOINT REPLACEMENT Left     knee   • OOPHORECTOMY     • DC NEUROPLASTY &/TRANSPOS MEDIAN NRV CARPAL TUNNE Right 11/14/2019    Procedure: RELEASE CARPAL TUNNEL;  Surgeon: Onesimo Tate MD;  [facility];  Service: Orthopedics   • DC TOTAL THYROID LOBECTOMY UNI W/WO ISTHMUSECTOMY Left 12/16/2020    Procedure: Left THYROID LOBECTOMY;  Surgeon: Jhony Bhatt MD;  Location[facility];  Service: ENT   • RECTAL POLYPECTOMY     • REPLACEMENT TOTAL KNEE Left     last assessed - 27Apr2015   • TONSILLECTOMY     • TOTAL ABDOMINAL HYSTERECTOMY     • TUBAL LIGATION     • US GUIDED THYROID BIOPSY  10/14/2020       Current Outpatient Medications   Medication Sig Dispense Refill   • acetylcysteine (MUCOMYST) 200 mg/mL nebulizer solution Take 4 mL (800 mg total) by nebulization every 4 (four) hours 180 mL 5   • albuterol (2.5 mg/3 mL) 0.083 % nebulizer solution Take 3 mL (2.5 mg total) by nebulization every 6 (six) hours as needed for wheezing or shortness of breath 1080 mL 0   • albuterol (ProAir HFA) 90 mcg/act inhaler Inhale 2 puffs every 6 (six) hours as needed for wheezing 8.5 g 3   • apixaban (ELIQUIS) 5 mg Take 1 tablet (5 mg total) by mouth 2 (two) times  a day 60 tablet 3   • ascorbic acid (VITAMIN C) 500 mg tablet Take 500 mg by mouth daily      • Cartia  MG 24 hr capsule take 1 capsule by mouth twice a day .YOU MAKE TAKE AN ADDITIONAL CAPSULE DAILY IF YOU HAVE PALPITATIONS AND ELEVATED HEART RATE CONSISTENT WITH YOUR A FIB. MAX 3 CAPS DAILY 270 capsule 1   • Cetirizine HCl (ZyrTEC Allergy) 10 MG CAPS Take 10 mg by mouth in the morning     • Cholecalciferol 50 MCG (2000 UT) CAPS Take 2,000 Units by mouth 2 (two) times a day     • Chromium Picolinate (CHROMIUM PICOLATE PO) Take 1 tablet by mouth daily     • DULoxetine (CYMBALTA) 20 mg capsule take 1 capsule by mouth once daily 30 capsule 5   • ezetimibe (ZETIA) 10 mg tablet take 1 tablet by mouth once daily 90 tablet 3   • flecainide (TAMBOCOR) 100 mg tablet Take 1 tablet (100 mg total) by mouth 2 (two) times a day 57344 tablet 3   • furosemide (LASIX) 20 mg tablet Take 1 tablet (20 mg total) by mouth daily take 2 tablet by mouth daily if needed for shortness of breath WEIGHT GAIN 3 LBS IN 1 DAY OR LOWER LEG SWELLING 90 tablet 3   • gabapentin (NEURONTIN) 300 mg capsule take 1 capsule by mouth at bedtime 90 capsule 1   • ipratropium (ATROVENT) 0.03 % nasal spray 2 sprays into each nostril 3 (three) times a day Begin once per day, then progress to twice per day. Progress to three times a day as tolerated. (Patient not taking: Reported on 1/31/2024) 90 mL 3   • losartan (COZAAR) 50 mg tablet take 1 tablet by mouth twice a day 180 tablet 3   • metoprolol succinate (TOPROL-XL) 100 mg 24 hr tablet Take 1 tablet (100 mg total) by mouth every 12 (twelve) hours 180 tablet 3   • rOPINIRole (REQUIP) 1 mg tablet Take 2 tablets (2 mg total) by mouth daily at bedtime 180 tablet 1   • Saccharomyces boulardii (Probiotic) 250 MG CAPS Take 1 capsule by mouth daily (Patient not taking: Reported on 3/19/2024)     • sodium chloride 3 % inhalation solution Take 4 mL by nebulization as needed for cough 152 mL 2   • TURMERIC PO Take  1 capsule by mouth 2 (two) times a day     • zolpidem (AMBIEN) 10 mg tablet Take 1 tablet (10 mg total) by mouth daily at bedtime as needed for sleep 90 tablet 1     No current facility-administered medications for this visit.        Allergies   Allergen Reactions   • Sotalol Other (See Comments)     Prolonged QTc leading to torsades de pointes    • Penicillins Other (See Comments)     As a child calcium deposit in the arm    • Ace Inhibitors GI Intolerance     Did feel well on it   • Omeprazole Abdominal Pain, Rash and Vomiting     stomach pain, vomiting, rash       Review of Systems    Video Exam    There were no vitals filed for this visit.    Physical Exam     Visit Time  Total Visit Duration: 30 minutes

## 2024-04-22 NOTE — LETTER
April 22, 2024     Arturo Mcgowan MD  [address]  [address]  [facility] [address]    Patient: Farnaz Martin   YOB: 1945   Date of Visit: 4/22/2024       Dear Dr. Mcgowan:    Thank you for referring Farnaz Martin to me for evaluation. Below are my notes for this consultation.    If you have questions, please do not hesitate to call me. I look forward to following your patient along with you.         Sincerely,        Anna Grullon MD        CC: MD Anna Craven MD  4/22/2024 12:14 PM  Sign when Signing Visit  Virtual Regular Visit    Verification of patient location:    Patient is located at Home in the following state in which I hold an active license [place]      Assessment/Plan:    Problem List Items Addressed This Visit          Urinary    Left renal mass - Primary     I had a pleasant conversation with the patient and her son-in-law through a video virtual visit today.    I showed them the CAT scan findings of a left renal mass.  The mass measures 1.9 to 2.0 cm in size.  We reviewed various treatment options with emphasis on minimally invasive therapy.  The imaging features are concerning for T1a renal cell carcinoma.  We discussed that both active surveillance and cryoablation are reasonable options at this point.  I went on to describe what cryoablation is by showing them a video animation with schematics.  We talked about risk versus benefit.      Her son-in-law raised the question that given her co-morbidities whether active surveillance would be favored at this point.  I acknowledged that it is reasonable to obtain follow-up imaging at another time point to assess growth rate.  We talked about the risk versus benefit of this approach as well.  They went on to ask the chance of metastasis and tumor significantly affecting renal function for which I informed them that the probability is very low given the small size of this tumor.      After our discussion, the patient  elected to obtain a follow-up scan in 3 months and we will reconvene to discuss the results and plan moving forward.    -CT abdomen with contrast ordered and to be done in 3 months.  -Follow-up virtual visit in 3 to 4 months after the scan is done.              Other Visit Diagnoses       Renal mass, left        Relevant Orders    CT abdomen w contrast                 Reason for visit is   Chief Complaint   Patient presents with   • Virtual Regular Visit          Encounter provider Anna Grullon MD    Provider located at [facility] INTERVENTIONAL RADIOLOGY [place]  [address]  [address]  [facility] [address]  [phone]      Recent Visits  No visits were found meeting these conditions.  Showing recent visits within past 7 days and meeting all other requirements  Today's Visits  Date Type Provider Dept   04/22/24 Telemedicine Anna Grullon MD [facility]   Showing today's visits and meeting all other requirements  Future Appointments  No visits were found meeting these conditions.  Showing future appointments within next 150 days and meeting all other requirements       The patient was identified by name and date of birth. Farnaz Martin was informed that this is a telemedicine visit and that the visit is being conducted through the Epic Embedded platform. She agrees to proceed..  My office door was closed. No one else was in the room.  She acknowledged consent and understanding of privacy and security of the video platform. The patient has agreed to participate and understands they can discontinue the visit at any time.    Patient is aware this is a billable service.     Subjective  Farnaz Martin is a 78 y.o. female with left renal mass.      HPI     Past Medical History:   Diagnosis Date   • Actinic keratosis     last assessed - 06Jun2014   • Arthritis    • Atrial fibrillation with rapid ventricular response (HCC)     last assessed - 26Apr2016   • Basal cell carcinoma    • Benign colon polyp      last assessed - 27Apr2015   • CHF (congestive heart failure) ([facility]) 06/2022   • Disease of thyroid gland    • Effusion of knee joint right     Resolved - 19Apr2016   • Esophageal reflux    • Fibromyalgia     last assessed - 27Dec2017   • Fibromyalgia, primary    • GERD (gastroesophageal reflux disease)    • Hypertension    • Palpitations     last assessed - 30Apr2013   • Peroneal tendonitis, right     last assessed - 02Oct2013   • Right lumbar radiculopathy 03/17/2016   • Thyroid cancer ([facility])    • Thyroid nodule    • Trochanteric bursitis of left hip 03/09/2018       Past Surgical History:   Procedure Laterality Date   • CARDIAC ELECTROPHYSIOLOGY STUDY AND ABLATION  08/2022   • CATARACT EXTRACTION Bilateral    • COLONOSCOPY  03/2018   • COLONOSCOPY W/ POLYPECTOMY  2021    repeat in 5 years   • EYE SURGERY     • HYSTERECTOMY     • JOINT REPLACEMENT Left     knee   • OOPHORECTOMY     • [place] NEUROPLASTY &/TRANSPOS MEDIAN NRV CARPAL TUNNE Right 11/14/2019    Procedure: RELEASE CARPAL TUNNEL;  Surgeon: Onesimo Tate MD;  [facility];  Service: Orthopedics   • IL TOTAL THYROID LOBECTOMY UNI W/WO ISTHMUSECTOMY Left 12/16/2020    Procedure: Left THYROID LOBECTOMY;  Surgeon: Jhony Bhatt MD;  [facility];  Service: ENT   • RECTAL POLYPECTOMY     • REPLACEMENT TOTAL KNEE Left     last assessed - 27Apr2015   • TONSILLECTOMY     • TOTAL ABDOMINAL HYSTERECTOMY     • TUBAL LIGATION     • US GUIDED THYROID BIOPSY  10/14/2020       Current Outpatient Medications   Medication Sig Dispense Refill   • acetylcysteine (MUCOMYST) 200 mg/mL nebulizer solution Take 4 mL (800 mg total) by nebulization every 4 (four) hours 180 mL 5   • albuterol (2.5 mg/3 mL) 0.083 % nebulizer solution Take 3 mL (2.5 mg total) by nebulization every 6 (six) hours as needed for wheezing or shortness of breath 1080 mL 0   • albuterol (ProAir HFA) 90 mcg/act inhaler Inhale 2 puffs every 6 (six) hours as needed for wheezing 8.5 g 3   • apixaban  (ELIQUIS) 5 mg Take 1 tablet (5 mg total) by mouth 2 (two) times a day 60 tablet 3   • ascorbic acid (VITAMIN C) 500 mg tablet Take 500 mg by mouth daily      • Cartia  MG 24 hr capsule take 1 capsule by mouth twice a day .YOU MAKE TAKE AN ADDITIONAL CAPSULE DAILY IF YOU HAVE PALPITATIONS AND ELEVATED HEART RATE CONSISTENT WITH YOUR A FIB. MAX 3 CAPS DAILY 270 capsule 1   • Cetirizine HCl (ZyrTEC Allergy) 10 MG CAPS Take 10 mg by mouth in the morning     • Cholecalciferol 50 MCG (2000 UT) CAPS Take 2,000 Units by mouth 2 (two) times a day     • Chromium Picolinate (CHROMIUM PICOLATE PO) Take 1 tablet by mouth daily     • DULoxetine (CYMBALTA) 20 mg capsule take 1 capsule by mouth once daily 30 capsule 5   • ezetimibe (ZETIA) 10 mg tablet take 1 tablet by mouth once daily 90 tablet 3   • flecainide (TAMBOCOR) 100 mg tablet Take 1 tablet (100 mg total) by mouth 2 (two) times a day 53822 tablet 3   • furosemide (LASIX) 20 mg tablet Take 1 tablet (20 mg total) by mouth daily take 2 tablet by mouth daily if needed for shortness of breath WEIGHT GAIN 3 LBS IN 1 DAY OR LOWER LEG SWELLING 90 tablet 3   • gabapentin (NEURONTIN) 300 mg capsule take 1 capsule by mouth at bedtime 90 capsule 1   • ipratropium (ATROVENT) 0.03 % nasal spray 2 sprays into each nostril 3 (three) times a day Begin once per day, then progress to twice per day. Progress to three times a day as tolerated. (Patient not taking: Reported on 1/31/2024) 90 mL 3   • losartan (COZAAR) 50 mg tablet take 1 tablet by mouth twice a day 180 tablet 3   • metoprolol succinate (TOPROL-XL) 100 mg 24 hr tablet Take 1 tablet (100 mg total) by mouth every 12 (twelve) hours 180 tablet 3   • rOPINIRole (REQUIP) 1 mg tablet Take 2 tablets (2 mg total) by mouth daily at bedtime 180 tablet 1   • Saccharomyces boulardii (Probiotic) 250 MG CAPS Take 1 capsule by mouth daily (Patient not taking: Reported on 3/19/2024)     • sodium chloride 3 % inhalation solution Take 4 mL  by nebulization as needed for cough 152 mL 2   • TURMERIC PO Take 1 capsule by mouth 2 (two) times a day     • zolpidem (AMBIEN) 10 mg tablet Take 1 tablet (10 mg total) by mouth daily at bedtime as needed for sleep 90 tablet 1     No current facility-administered medications for this visit.        Allergies   Allergen Reactions   • Sotalol Other (See Comments)     Prolonged QTc leading to torsades de pointes    • Penicillins Other (See Comments)     As a child calcium deposit in the arm    • Ace Inhibitors GI Intolerance     Did feel well on it   • Omeprazole Abdominal Pain, Rash and Vomiting     stomach pain, vomiting, rash       Review of Systems    Video Exam    There were no vitals filed for this visit.    Physical Exam     Visit Time  Total Visit Duration: 30 minutes

## 2024-04-22 NOTE — PROGRESS NOTES
Virtual Regular Visit    Verification of patient location:    Patient is located at Home in the following state in which I hold an active license [place]      Assessment/Plan:    Problem List Items Addressed This Visit          Urinary    Left renal mass - Primary     I had a pleasant conversation with the patient and her son-in-law through a video virtual visit today.    I showed them the CAT scan findings of a left renal mass.  The mass measures 1.9 to 2.0 cm in size.  We reviewed various treatment options with emphasis on minimally invasive therapy.  The imaging features are concerning for T1a renal cell carcinoma.  We discussed that both active surveillance and cryoablation are reasonable options at this point.  I went on to describe what cryoablation is by showing them a video animation with schematics.  We talked about risk versus benefit.      Her son-in-law raised the question that given her co-morbidities whether active surveillance would be favored at this point.  I acknowledged that it is reasonable to obtain follow-up imaging at another time point to assess growth rate.  We talked about the risk versus benefit of this approach as well.  They went on to ask the chance of metastasis and tumor significantly affecting renal function for which I informed them that the probability is very low given the small size of this tumor.      After our discussion, the patient elected to obtain a follow-up scan in 3 months and we will reconvene to discuss the results and plan moving forward.    -CT abdomen with contrast ordered and to be done in 3 months.  -Follow-up virtual visit in 3 to 4 months after the scan is done.              Other Visit Diagnoses       Renal mass, left        Relevant Orders    CT abdomen w contrast                 Reason for visit is   Chief Complaint   Patient presents with    Virtual Regular Visit          Encounter provider Anna Grullon MD    Provider located at [facility]  [facility]  [address]  [address]  [facility] [place] [address]  [phone]      Recent Visits  No visits were found meeting these conditions.  Showing recent visits within past 7 days and meeting all other requirements  Today's Visits  Date Type Provider Dept   04/22/24 Telemedicine Anna Grullon MD [facility]   Showing today's visits and meeting all other requirements  Future Appointments  No visits were found meeting these conditions.  Showing future appointments within next 150 days and meeting all other requirements       The patient was identified by name and date of birth. Farnaz Martin was informed that this is a telemedicine visit and that the visit is being conducted through the Epic Embedded platform. She agrees to proceed..  My office door was closed. No one else was in the room.  She acknowledged consent and understanding of privacy and security of the video platform. The patient has agreed to participate and understands they can discontinue the visit at any time.    Patient is aware this is a billable service.     Subjective  Farnaz Martin is a 78 y.o. female with left renal mass.      HPI     Past Medical History:   Diagnosis Date    Actinic keratosis     last assessed - 06Jun2014    Arthritis     Atrial fibrillation with rapid ventricular response (HCC)     last assessed - 26Apr2016    Basal cell carcinoma     Benign colon polyp     last assessed - 27Apr2015    CHF (congestive heart failure) (HCC) 06/2022    Disease of thyroid gland     Effusion of knee joint right     Resolved - [id number]    Esophageal reflux     Fibromyalgia     last assessed - [id number]    Fibromyalgia, primary     GERD (gastroesophageal reflux disease)     Hypertension     Palpitations     last assessed - 30Apr2013    Peroneal tendonitis, right     last assessed - [id number]    Right lumbar radiculopathy 03/17/2016    Thyroid cancer (HCC)     Thyroid nodule     Trochanteric bursitis of left hip  03/09/2018       Past Surgical History:   Procedure Laterality Date    CARDIAC ELECTROPHYSIOLOGY STUDY AND ABLATION  08/2022    CATARACT EXTRACTION Bilateral     COLONOSCOPY  03/2018    COLONOSCOPY W/ POLYPECTOMY  2021    repeat in 5 years    EYE SURGERY      HYSTERECTOMY      JOINT REPLACEMENT Left     knee    OOPHORECTOMY      MI NEUROPLASTY &/TRANSPOS MEDIAN NRV CARPAL TUNNE Right 11/14/2019    Procedure: RELEASE CARPAL TUNNEL;  Surgeon: Onesimo Tate MD;  [facility];  Service: Orthopedics    MI TOTAL THYROID LOBECTOMY UNI W/WO ISTHMUSECTOMY Left 12/16/2020    Procedure: Left THYROID LOBECTOMY;  Surgeon: Jhony Bhatt MD;  [facility];  Service: ENT    RECTAL POLYPECTOMY      REPLACEMENT TOTAL KNEE Left     last assessed - 27Apr2015    TONSILLECTOMY      TOTAL ABDOMINAL HYSTERECTOMY      TUBAL LIGATION      US GUIDED THYROID BIOPSY  10/14/2020       Current Outpatient Medications   Medication Sig Dispense Refill    acetylcysteine (MUCOMYST) 200 mg/mL nebulizer solution Take 4 mL (800 mg total) by nebulization every 4 (four) hours 180 mL 5    albuterol (2.5 mg/3 mL) 0.083 % nebulizer solution Take 3 mL (2.5 mg total) by nebulization every 6 (six) hours as needed for wheezing or shortness of breath 1080 mL 0    albuterol (ProAir HFA) 90 mcg/act inhaler Inhale 2 puffs every 6 (six) hours as needed for wheezing 8.5 g 3    apixaban (ELIQUIS) 5 mg Take 1 tablet (5 mg total) by mouth 2 (two) times a day 60 tablet 3    ascorbic acid (VITAMIN C) 500 mg tablet Take 500 mg by mouth daily       Cartia  MG 24 hr capsule take 1 capsule by mouth twice a day .YOU MAKE TAKE AN ADDITIONAL CAPSULE DAILY IF YOU HAVE PALPITATIONS AND ELEVATED HEART RATE CONSISTENT WITH YOUR A FIB. MAX 3 CAPS DAILY 270 capsule 1    Cetirizine HCl (ZyrTEC Allergy) 10 MG CAPS Take 10 mg by mouth in the morning      Cholecalciferol 50 MCG (2000 UT) CAPS Take 2,000 Units by mouth 2 (two) times a day      Chromium Picolinate  (CHROMIUM PICOLATE PO) Take 1 tablet by mouth daily      DULoxetine (CYMBALTA) 20 mg capsule take 1 capsule by mouth once daily 30 capsule 5    ezetimibe (ZETIA) 10 mg tablet take 1 tablet by mouth once daily 90 tablet 3    flecainide (TAMBOCOR) 100 mg tablet Take 1 tablet (100 mg total) by mouth 2 (two) times a day 06300 tablet 3    furosemide (LASIX) 20 mg tablet Take 1 tablet (20 mg total) by mouth daily take 2 tablet by mouth daily if needed for shortness of breath WEIGHT GAIN 3 LBS IN 1 DAY OR LOWER LEG SWELLING 90 tablet 3    gabapentin (NEURONTIN) 300 mg capsule take 1 capsule by mouth at bedtime 90 capsule 1    ipratropium (ATROVENT) 0.03 % nasal spray 2 sprays into each nostril 3 (three) times a day Begin once per day, then progress to twice per day. Progress to three times a day as tolerated. (Patient not taking: Reported on 1/31/2024) 90 mL 3    losartan (COZAAR) 50 mg tablet take 1 tablet by mouth twice a day 180 tablet 3    metoprolol succinate (TOPROL-XL) 100 mg 24 hr tablet Take 1 tablet (100 mg total) by mouth every 12 (twelve) hours 180 tablet 3    rOPINIRole (REQUIP) 1 mg tablet Take 2 tablets (2 mg total) by mouth daily at bedtime 180 tablet 1    Saccharomyces boulardii (Probiotic) 250 MG CAPS Take 1 capsule by mouth daily (Patient not taking: Reported on 3/19/2024)      sodium chloride 3 % inhalation solution Take 4 mL by nebulization as needed for cough 152 mL 2    TURMERIC PO Take 1 capsule by mouth 2 (two) times a day      zolpidem (AMBIEN) 10 mg tablet Take 1 tablet (10 mg total) by mouth daily at bedtime as needed for sleep 90 tablet 1     No current facility-administered medications for this visit.        Allergies   Allergen Reactions    Sotalol Other (See Comments)     Prolonged QTc leading to torsades de pointes     Penicillins Other (See Comments)     As a child calcium deposit in the arm     Ace Inhibitors GI Intolerance     Did feel well on it    Omeprazole Abdominal Pain, Rash and  Vomiting     stomach pain, vomiting, rash       Review of Systems    Video Exam    There were no vitals filed for this visit.    Physical Exam     Visit Time  Total Visit Duration: 30 minutes

## 2024-04-22 NOTE — ASSESSMENT & PLAN NOTE
I had a pleasant conversation with the patient and her son-in-law through a video virtual visit today.    I showed them the CAT scan findings of a left renal mass.  The mass measures 1.9 to 2.0 cm in size.  We reviewed various treatment options with emphasis on minimally invasive therapy.  The imaging features are concerning for T1a renal cell carcinoma.  We discussed that both active surveillance and cryoablation are reasonable options at this point.  I went on to describe what cryoablation is by showing them a video animation with schematics.  We talked about risk versus benefit.      Her son-in-law raised the question that given her co-morbidities whether active surveillance would be favored at this point.  I acknowledged that it is reasonable to obtain follow-up imaging at another time point to assess growth rate.  We talked about the risk versus benefit of this approach as well.  They went on to ask the chance of metastasis and tumor significantly affecting renal function for which I informed them that the probability is very low given the small size of this tumor.      After our discussion, the patient elected to obtain a follow-up scan in 3 months and we will reconvene to discuss the results and plan moving forward.    -CT abdomen with contrast ordered and to be done in 3 months.  -Follow-up virtual visit in 3 to 4 months after the scan is done.

## 2024-04-22 NOTE — LETTER
April 22, 2024     Arturo Mcgowan MD  [address]  [address]  [facility] [address]    Patient: Farnaz Martin   YOB: 1945   Date of Visit: 4/22/2024       Dear Dr. Mcgowan:    Thank you for referring Farnaz Martin to me for evaluation. Below are my notes for this consultation.    If you have questions, please do not hesitate to call me. I look forward to following your patient along with you.         Sincerely,        Anna Grullon MD        CC: MD Anna Craven MD  4/22/2024 12:14 PM  Sign when Signing Visit  Virtual Regular Visit    Verification of patient location:    Patient is located at Home in the following state in which I hold an active license [place]      Assessment/Plan:    Problem List Items Addressed This Visit          Urinary    Left renal mass - Primary     I had a pleasant conversation with the patient and her son-in-law through a video virtual visit today.    I showed them the CAT scan findings of a left renal mass.  The mass measures 1.9 to 2.0 cm in size.  We reviewed various treatment options with emphasis on minimally invasive therapy.  The imaging features are concerning for T1a renal cell carcinoma.  We discussed that both active surveillance and cryoablation are reasonable options at this point.  I went on to describe what cryoablation is by showing them a video animation with schematics.  We talked about risk versus benefit.      Her son-in-law raised the question that given her co-morbidities whether active surveillance would be favored at this point.  I acknowledged that it is reasonable to obtain follow-up imaging at another time point to assess growth rate.  We talked about the risk versus benefit of this approach as well.  They went on to ask the chance of metastasis and tumor significantly affecting renal function for which I informed them that the probability is very low given the small size of this tumor.      After our discussion, the patient  elected to obtain a follow-up scan in 3 months and we will reconvene to discuss the results and plan moving forward.    -CT abdomen with contrast ordered and to be done in 3 months.  -Follow-up virtual visit in 3 to 4 months after the scan is done.              Other Visit Diagnoses       Renal mass, left        Relevant Orders    CT abdomen w contrast                 Reason for visit is   Chief Complaint   Patient presents with    Virtual Regular Visit          Encounter provider Anna Gruloln MD    Provider located at [facility] [place]  [address]  [address]  [facility] [address]  [phone]      Recent Visits  No visits were found meeting these conditions.  Showing recent visits within past 7 days and meeting all other requirements  Today's Visits  Date Type Provider Dept   04/22/24 Telemedicine Anna Grullon MD [facility]   Showing today's visits and meeting all other requirements  Future Appointments  No visits were found meeting these conditions.  Showing future appointments within next 150 days and meeting all other requirements       The patient was identified by name and date of birth. Farnaz Martin was informed that this is a telemedicine visit and that the visit is being conducted through the Epic Embedded platform. She agrees to proceed..  My office door was closed. No one else was in the room.  She acknowledged consent and understanding of privacy and security of the video platform. The patient has agreed to participate and understands they can discontinue the visit at any time.    Patient is aware this is a billable service.     Subjective  Farnaz Martin is a 78 y.o. female with left renal mass.      HPI     Past Medical History:   Diagnosis Date    Actinic keratosis     last assessed - 06Jun2014    Arthritis     Atrial fibrillation with rapid ventricular response (HCC)     last assessed - 26Apr2016    Basal cell carcinoma     Benign colon polyp      last assessed - 27Apr2015    CHF (congestive heart failure) (HCC) 06/2022    Disease of thyroid gland     Effusion of knee joint right     Resolved - [id number]    Esophageal reflux     Fibromyalgia     last assessed - [id number]    Fibromyalgia, primary     GERD (gastroesophageal reflux disease)     Hypertension     Palpitations     last assessed - [id number]    Peroneal tendonitis, right     last assessed - [id number]    Right lumbar radiculopathy 03/17/2016    Thyroid cancer (HCC)     Thyroid nodule     Trochanteric bursitis of left hip 03/09/2018       Past Surgical History:   Procedure Laterality Date    CARDIAC ELECTROPHYSIOLOGY STUDY AND ABLATION  08/2022    CATARACT EXTRACTION Bilateral     COLONOSCOPY  03/2018    COLONOSCOPY W/ POLYPECTOMY  2021    repeat in 5 years    EYE SURGERY      HYSTERECTOMY      JOINT REPLACEMENT Left     knee    OOPHORECTOMY      OR NEUROPLASTY &/TRANSPOS MEDIAN NRV CARPAL TUNNE Right 11/14/2019    Procedure: RELEASE CARPAL TUNNEL;  Surgeon: Onesimo Tate MD;  [facility];  Service: Orthopedics    OR TOTAL THYROID LOBECTOMY UNI W/WO ISTHMUSECTOMY Left 12/16/2020    Procedure: Left THYROID LOBECTOMY;  Surgeon: Jhony Bhatt MD;  [facility];  Service: ENT    RECTAL POLYPECTOMY      REPLACEMENT TOTAL KNEE Left     last assessed - 27Apr2015    TONSILLECTOMY      TOTAL ABDOMINAL HYSTERECTOMY      TUBAL LIGATION      US GUIDED THYROID BIOPSY  10/14/2020       Current Outpatient Medications   Medication Sig Dispense Refill    acetylcysteine (MUCOMYST) 200 mg/mL nebulizer solution Take 4 mL (800 mg total) by nebulization every 4 (four) hours 180 mL 5    albuterol (2.5 mg/3 mL) 0.083 % nebulizer solution Take 3 mL (2.5 mg total) by nebulization every 6 (six) hours as needed for wheezing or shortness of breath 1080 mL 0    albuterol (ProAir HFA) 90 mcg/act inhaler Inhale 2 puffs every 6 (six) hours as needed for wheezing 8.5 g 3    apixaban (ELIQUIS) 5 mg Take 1 tablet (5 mg  total) by mouth 2 (two) times a day 60 tablet 3    ascorbic acid (VITAMIN C) 500 mg tablet Take 500 mg by mouth daily       Cartia  MG 24 hr capsule take 1 capsule by mouth twice a day .YOU MAKE TAKE AN ADDITIONAL CAPSULE DAILY IF YOU HAVE PALPITATIONS AND ELEVATED HEART RATE CONSISTENT WITH YOUR A FIB. MAX 3 CAPS DAILY 270 capsule 1    Cetirizine HCl (ZyrTEC Allergy) 10 MG CAPS Take 10 mg by mouth in the morning      Cholecalciferol 50 MCG (2000 UT) CAPS Take 2,000 Units by mouth 2 (two) times a day      Chromium Picolinate (CHROMIUM PICOLATE PO) Take 1 tablet by mouth daily      DULoxetine (CYMBALTA) 20 mg capsule take 1 capsule by mouth once daily 30 capsule 5    ezetimibe (ZETIA) 10 mg tablet take 1 tablet by mouth once daily 90 tablet 3    flecainide (TAMBOCOR) 100 mg tablet Take 1 tablet (100 mg total) by mouth 2 (two) times a day 03384 tablet 3    furosemide (LASIX) 20 mg tablet Take 1 tablet (20 mg total) by mouth daily take 2 tablet by mouth daily if needed for shortness of breath WEIGHT GAIN 3 LBS IN 1 DAY OR LOWER LEG SWELLING 90 tablet 3    gabapentin (NEURONTIN) 300 mg capsule take 1 capsule by mouth at bedtime 90 capsule 1    ipratropium (ATROVENT) 0.03 % nasal spray 2 sprays into each nostril 3 (three) times a day Begin once per day, then progress to twice per day. Progress to three times a day as tolerated. (Patient not taking: Reported on 1/31/2024) 90 mL 3    losartan (COZAAR) 50 mg tablet take 1 tablet by mouth twice a day 180 tablet 3    metoprolol succinate (TOPROL-XL) 100 mg 24 hr tablet Take 1 tablet (100 mg total) by mouth every 12 (twelve) hours 180 tablet 3    rOPINIRole (REQUIP) 1 mg tablet Take 2 tablets (2 mg total) by mouth daily at bedtime 180 tablet 1    Saccharomyces boulardii (Probiotic) 250 MG CAPS Take 1 capsule by mouth daily (Patient not taking: Reported on 3/19/2024)      sodium chloride 3 % inhalation solution Take 4 mL by nebulization as needed for cough 152 mL 2     TURMERIC PO Take 1 capsule by mouth 2 (two) times a day      zolpidem (AMBIEN) 10 mg tablet Take 1 tablet (10 mg total) by mouth daily at bedtime as needed for sleep 90 tablet 1     No current facility-administered medications for this visit.        Allergies   Allergen Reactions    Sotalol Other (See Comments)     Prolonged QTc leading to torsades de pointes     Penicillins Other (See Comments)     As a child calcium deposit in the arm     Ace Inhibitors GI Intolerance     Did feel well on it    Omeprazole Abdominal Pain, Rash and Vomiting     stomach pain, vomiting, rash       Review of Systems    Video Exam    There were no vitals filed for this visit.    Physical Exam     Visit Time  Total Visit Duration: 30 minutes

## 2024-04-22 NOTE — TELEPHONE ENCOUNTER
IR Clinic Consult:    Patient clinical visit in 4 month.    Schedule visit for the first available IR Physician: No                *If no, schedule for:   IR Physician: Yadi    Type of Visit: Virtual Regular Visit - Video    Additional Details: f/u to discuss CT abdomen results and plan forward.

## 2024-04-24 ENCOUNTER — TELEPHONE (OUTPATIENT)
Age: 79
End: 2024-04-24

## 2024-04-24 NOTE — TELEPHONE ENCOUNTER
Lab Result: MRI abdomen w wo contrast and mrcp   Date/Time Drawn: 04/16/2024   Ordering Provider: Dr. Monsivais    Lab Personnel's Name: [facility]

## 2024-04-30 ENCOUNTER — TELEPHONE (OUTPATIENT)
Age: 79
End: 2024-04-30

## 2024-04-30 DIAGNOSIS — G25.81 RLS (RESTLESS LEGS SYNDROME): ICD-10-CM

## 2024-04-30 NOTE — TELEPHONE ENCOUNTER
Patient calling stating that she did not get a chance to follow up with GI as previously recommended and she wanted to know due to the imaging update for the pancreas if Dr. Monsivais still wanted patient to follow up.  Katty Elam

## 2024-05-01 RX ORDER — ROPINIROLE 1 MG/1
2 TABLET, FILM COATED ORAL
Qty: 180 TABLET | Refills: 1 | Status: SHIPPED | OUTPATIENT
Start: 2024-05-01 | End: 2024-05-06 | Stop reason: SDUPTHER

## 2024-05-02 ENCOUNTER — TELEPHONE (OUTPATIENT)
Dept: NEUROLOGY | Facility: CLINIC | Age: 79
End: 2024-05-02

## 2024-05-02 ENCOUNTER — NURSE TRIAGE (OUTPATIENT)
Age: 79
End: 2024-05-02

## 2024-05-02 NOTE — TELEPHONE ENCOUNTER
Patient of Dr. Mcgowan, last seen on 3/27/24    Patient calling in to obtain MRI results form 4/16/24    Patients PCP had ordered the imaging, however patient requesting her Urologist also advise.     Abnormal findings, Please advise

## 2024-05-02 NOTE — TELEPHONE ENCOUNTER
Can you please call Farnaz and let her know that I reviewed her most recent MRI results and also reviewed her medical chart.  The kidney mass that was noted on the MRI is essentially unchanged from the previous multiple CT images that she has  had done in the past.  I reviewed her most recent office visit with Dr. Mcgowan and it appears that they had discussed moving forward with cryoablation of the kidney mass.  She did have a consult with interventional radiology on April 22 to discuss procedure and at that point in time they had decided to obtain a repeat scan in approximately 3 months to reassess the mass and discuss plan of care moving forward. She also has a follow-up appointment with Navdeep zhu in June.  Regarding the other MRI findings she should reach out to her primary care provider to discuss.

## 2024-05-02 NOTE — TELEPHONE ENCOUNTER
Called Farnaz back and notified her that the kidney mass that was noted on the MRI is essentially unchanged from the previous multiple CT images that she has had done in the past.   She did have a consult with interventional radiology on April 22 to discuss procedure and at that point in time they had decided to obtain a repeat scan in approximately 3 months to reassess the mass and discuss plan of care moving forward. She also has a follow-up appointment with Navdeep zhu in June.  Regarding- swelling in her legs she should reach out to her primary care provider to discuss.

## 2024-05-06 ENCOUNTER — TELEPHONE (OUTPATIENT)
Dept: FAMILY MEDICINE CLINIC | Facility: CLINIC | Age: 79
End: 2024-05-06

## 2024-05-06 ENCOUNTER — TELEPHONE (OUTPATIENT)
Age: 79
End: 2024-05-06

## 2024-05-06 ENCOUNTER — OFFICE VISIT (OUTPATIENT)
Dept: FAMILY MEDICINE CLINIC | Facility: CLINIC | Age: 79
End: 2024-05-06
Payer: MEDICARE

## 2024-05-06 ENCOUNTER — NURSE TRIAGE (OUTPATIENT)
Age: 79
End: 2024-05-06

## 2024-05-06 VITALS
DIASTOLIC BLOOD PRESSURE: 78 MMHG | SYSTOLIC BLOOD PRESSURE: 132 MMHG | WEIGHT: 158 LBS | RESPIRATION RATE: 18 BRPM | HEART RATE: 61 BPM | BODY MASS INDEX: 29.08 KG/M2 | TEMPERATURE: 97.6 F | OXYGEN SATURATION: 98 % | HEIGHT: 62 IN

## 2024-05-06 DIAGNOSIS — I48.0 PAROXYSMAL ATRIAL FIBRILLATION (HCC): ICD-10-CM

## 2024-05-06 DIAGNOSIS — J47.9 BRONCHIECTASIS WITHOUT COMPLICATION (HCC): ICD-10-CM

## 2024-05-06 DIAGNOSIS — G25.81 RLS (RESTLESS LEGS SYNDROME): ICD-10-CM

## 2024-05-06 DIAGNOSIS — R60.0 BILATERAL LEG EDEMA: Primary | ICD-10-CM

## 2024-05-06 DIAGNOSIS — I10 ESSENTIAL HYPERTENSION: ICD-10-CM

## 2024-05-06 DIAGNOSIS — I50.32 CHRONIC DIASTOLIC CHF (CONGESTIVE HEART FAILURE) (HCC): ICD-10-CM

## 2024-05-06 DIAGNOSIS — I27.20 PULMONARY HYPERTENSION (HCC): ICD-10-CM

## 2024-05-06 PROCEDURE — G2211 COMPLEX E/M VISIT ADD ON: HCPCS | Performed by: STUDENT IN AN ORGANIZED HEALTH CARE EDUCATION/TRAINING PROGRAM

## 2024-05-06 PROCEDURE — 99214 OFFICE O/P EST MOD 30 MIN: CPT | Performed by: STUDENT IN AN ORGANIZED HEALTH CARE EDUCATION/TRAINING PROGRAM

## 2024-05-06 RX ORDER — ROPINIROLE 1 MG/1
2 TABLET, FILM COATED ORAL
Qty: 180 TABLET | Refills: 1 | Status: SHIPPED | OUTPATIENT
Start: 2024-05-06

## 2024-05-06 NOTE — PROGRESS NOTES
Name: Farnaz Martin      : 1945      MRN: [id number]  Encounter Provider: Apple Lobato MD  Encounter Date: 2024   Encounter department: [facility]    Assessment & Plan     1. Bilateral leg edema  Assessment & Plan:  Likely 2/2 venous insufficiency vs medication side effect (Cartia) vs CHF exacerbation.   Wells Score: -2 ::: low risk for DVT  Have advised elevation of legs when sitting or supine  DME compression stocking ordered  Please also see plan for CHF    Orders:  -     Compression Stocking    2. Chronic diastolic CHF (congestive heart failure) (HCC)  Assessment & Plan:  Wt Readings from Last 3 Encounters:   24 71.7 kg (158 lb)   24 68 kg (150 lb)   24 71.2 kg (157 lb)     Managed on Metoprolol 100 mg BID and Lasix 20 mg QD  Will increase Lasix dose to BID for 5 days in the setting or increased b/l LE edema   Will obtain imaging for rule out exacerbation although unlikely as patient denies chest pain or sob and lungs CTAB.          Orders:  -     XR chest pa & lateral; Future; Expected date: 2024    3. Essential hypertension  Assessment & Plan:  Controlled on current regimen  Will continue to monitor      4. Paroxysmal atrial fibrillation (HCC)  Comments:  Managed on Eliquis   Rate and rhythm controlled at today's visit    5. RLS (restless legs syndrome)  Comments:  Managed on current regimen - will regill medication  Orders:  -     rOPINIRole (REQUIP) 1 mg tablet; Take 2 tablets (2 mg total) by mouth daily at bedtime    6. Pulmonary hypertension (HCC)    7. Bronchiectasis without complication (HCC)  Comments:  Patient presents with worsening cough likely 2/2 bronchiectasis  Follow up with pulmonology   ED precautions provided           Subjective     HPI  Patient with history of CHF and Bronchiectasis, currently managed on Lasix 20 mg QD.   Reports worsening coughing in the last 2 weeks. Pulmonology placed her on nebulizer and albuterol which was  initially helping her cough.   Patient reports bilateral LE edema that has been worsening in the past 2 weeks.  Reports hx of intermittent LE edema however never constant as it has been in the last 2 weeks.   Denies chest pain, sob, fever, chills, recent URI.    Review of Systems   Constitutional:  Negative for chills and fever.   HENT:  Negative for congestion, ear pain, rhinorrhea and sinus pain.    Eyes:  Negative for visual disturbance.   Respiratory:  Negative for chest tightness, shortness of breath and wheezing.    Cardiovascular:  Negative for chest pain and palpitations.   Gastrointestinal:  Negative for abdominal pain, constipation, diarrhea and vomiting.   Endocrine: Negative for polyuria.   Genitourinary:  Negative for dysuria.   Musculoskeletal:  Negative for myalgias.   Neurological:  Negative for dizziness, syncope and light-headedness.       Past Medical History:   Diagnosis Date    Actinic keratosis     last assessed - [id number]    Arthritis     Atrial fibrillation with rapid ventricular response (HCC)     last assessed - [id number]    Basal cell carcinoma     Benign colon polyp     last assessed - [id number]    CHF (congestive heart failure) ([facility]) 06/2022    Disease of thyroid gland     Effusion of knee joint right     Resolved - [id number]    Esophageal reflux     Fibromyalgia     last assessed - [id number]    Fibromyalgia, primary     GERD (gastroesophageal reflux disease)     Hypertension     Palpitations     last assessed - [id number]    Peroneal tendonitis, right     last assessed - [id number]    Right lumbar radiculopathy 03/17/2016    Thyroid cancer ([facility])     Thyroid nodule     Trochanteric bursitis of left hip 03/09/2018     Past Surgical History:   Procedure Laterality Date    CARDIAC ELECTROPHYSIOLOGY STUDY AND ABLATION  08/2022    CATARACT EXTRACTION Bilateral     COLONOSCOPY  03/2018    COLONOSCOPY W/ POLYPECTOMY  2021    repeat in 5 years    EYE SURGERY      HYSTERECTOMY      JOINT  REPLACEMENT Left     knee    OOPHORECTOMY      WA NEUROPLASTY &/TRANSPOS MEDIAN NRV CARPAL TUNNE Right 11/14/2019    Procedure: RELEASE CARPAL TUNNEL;  Surgeon: Onesimo Tate MD;  Location: [facility];  Service: Orthopedics    WA TOTAL THYROID LOBECTOMY UNI W/WO ISTHMUSECTOMY Left 12/16/2020    Procedure: Left THYROID LOBECTOMY;  Surgeon: Jhony Bhatt MD;  Location: [facility];  Service: ENT    RECTAL POLYPECTOMY      REPLACEMENT TOTAL KNEE Left     last assessed - [id number]    TONSILLECTOMY      TOTAL ABDOMINAL HYSTERECTOMY      TUBAL LIGATION      US GUIDED THYROID BIOPSY  10/14/2020     Family History   Problem Relation Age of Onset    Heart disease Mother     Diabetes Mother     Heart disease Father     Coronary artery disease Father     Stroke Father         cerebrovascular accident    Heart attack Father         myocardial infarction    Sudden death Father         scd    Other Family         Back disorder    Coronary artery disease Family     Neuropathy Family     Osteoporosis Family     No Known Problems Daughter     No Known Problems Maternal Grandmother     No Known Problems Maternal Grandfather     No Known Problems Paternal Grandmother     No Known Problems Paternal Grandfather     Cancer Maternal Uncle     Breast cancer Maternal Aunt 65    No Known Problems Son     No Known Problems Maternal Aunt     No Known Problems Maternal Aunt     No Known Problems Maternal Aunt     No Known Problems Paternal Aunt     No Known Problems Paternal Aunt     Anuerysm Neg Hx     Clotting disorder Neg Hx     Arrhythmia Neg Hx     Heart failure Neg Hx      Social History     Socioeconomic History    Marital status:      Spouse name: None    Number of children: None    Years of education: None    Highest education level: None   Occupational History    None   Tobacco Use    Smoking status: Never    Smokeless tobacco: Never   Vaping Use    Vaping status: Never Used   Substance and Sexual Activity    Alcohol use: Not  Currently    Drug use: Never    Sexual activity: Not Currently   Other Topics Concern    None   Social History Narrative    None     Social Determinants of Health     Financial Resource Strain: Patient Unable To Answer (12/19/2023)    Overall Financial Resource Strain (CARDIA)     Difficulty of Paying Living Expenses: Patient unable to answer   Food Insecurity: No Food Insecurity (1/5/2024)    Hunger Vital Sign     Worried About Running Out of Food in the Last Year: Never true     Ran Out of Food in the Last Year: Never true   Transportation Needs: No Transportation Needs (1/5/2024)    PRAPARE - Transportation     Lack of Transportation (Medical): No     Lack of Transportation (Non-Medical): No   Physical Activity: Not on file   Stress: Not on file   Social Connections: Not on file   Intimate Partner Violence: Not At Risk (7/31/2023)    Received from [facility]    Humiliation, Afraid, Rape, and Kick questionnaire     Fear of Current or Ex-Partner: No     Emotionally Abused: No     Physically Abused: No     Sexually Abused: No   Housing Stability: Low Risk  (1/5/2024)    Housing Stability Vital Sign     Unable to Pay for Housing in the Last Year: No     Number of Places Lived in the Last Year: 1     Unstable Housing in the Last Year: No     Current Outpatient Medications on File Prior to Visit   Medication Sig    acetylcysteine (MUCOMYST) 200 mg/mL nebulizer solution Take 4 mL (800 mg total) by nebulization every 4 (four) hours    albuterol (ProAir HFA) 90 mcg/act inhaler Inhale 2 puffs every 6 (six) hours as needed for wheezing    apixaban (ELIQUIS) 5 mg Take 1 tablet (5 mg total) by mouth 2 (two) times a day    ascorbic acid (VITAMIN C) 500 mg tablet Take 500 mg by mouth daily     Cartia  MG 24 hr capsule take 1 capsule by mouth twice a day .YOU MAKE TAKE AN ADDITIONAL CAPSULE DAILY IF YOU HAVE PALPITATIONS AND ELEVATED HEART RATE CONSISTENT WITH YOUR A FIB. MAX 3 CAPS DAILY    Cetirizine  HCl (ZyrTEC Allergy) 10 MG CAPS Take 10 mg by mouth in the morning    Cholecalciferol 50 MCG (2000 UT) CAPS Take 2,000 Units by mouth 2 (two) times a day    Chromium Picolinate (CHROMIUM PICOLATE PO) Take 1 tablet by mouth daily    DULoxetine (CYMBALTA) 20 mg capsule take 1 capsule by mouth once daily    ezetimibe (ZETIA) 10 mg tablet take 1 tablet by mouth once daily    flecainide (TAMBOCOR) 100 mg tablet Take 1 tablet (100 mg total) by mouth 2 (two) times a day    furosemide (LASIX) 20 mg tablet Take 1 tablet (20 mg total) by mouth daily take 2 tablet by mouth daily if needed for shortness of breath WEIGHT GAIN 3 LBS IN 1 DAY OR LOWER LEG SWELLING    gabapentin (NEURONTIN) 300 mg capsule take 1 capsule by mouth at bedtime    losartan (COZAAR) 50 mg tablet take 1 tablet by mouth twice a day    metoprolol succinate (TOPROL-XL) 100 mg 24 hr tablet Take 1 tablet (100 mg total) by mouth every 12 (twelve) hours    sodium chloride 3 % inhalation solution Take 4 mL by nebulization as needed for cough    TURMERIC PO Take 1 capsule by mouth 2 (two) times a day    zolpidem (AMBIEN) 10 mg tablet Take 1 tablet (10 mg total) by mouth daily at bedtime as needed for sleep    ipratropium (ATROVENT) 0.03 % nasal spray 2 sprays into each nostril 3 (three) times a day Begin once per day, then progress to twice per day. Progress to three times a day as tolerated. (Patient not taking: Reported on 1/31/2024)     Allergies   Allergen Reactions    Sotalol Other (See Comments)     Prolonged QTc leading to torsades de pointes     Penicillins Other (See Comments)     As a child calcium deposit in the arm     Ace Inhibitors GI Intolerance     Did feel well on it    Omeprazole Abdominal Pain, Rash and Vomiting     stomach pain, vomiting, rash     Immunization History   Administered Date(s) Administered    COVID-19 MODERNA VACC 0.5 ML IM 01/27/2021, 01/27/2021, 02/24/2021, 02/24/2021, 11/23/2021    COVID-19 Moderna Vac BIVALENT 12 Yr+ IM 0.5 ML  "12/09/2022    COVID-19 Moderna mRNA Vaccine 12 Yr+ 50 mcg/0.5 mL (Spikevax) 01/02/2024    INFLUENZA 12/23/2015, 12/23/2015, 11/07/2016, 11/07/2016, 10/18/2017, 10/18/2017, 12/04/2018, 12/04/2018, 09/26/2019, 09/08/2020, 11/03/2021, 09/19/2022, 09/19/2022, 12/19/2023    Influenza Split High Dose Preservative Free IM 10/01/2014, 12/23/2015, 11/07/2016, 10/18/2017    Influenza, high dose seasonal 0.7 mL 12/04/2018, 09/26/2019, 09/08/2020, 11/03/2021, 09/19/2022, 12/19/2023    Influenza, seasonal, injectable 10/16/2012    Pneumococcal Conjugate 13-Valent 04/27/2015    Pneumococcal Polysaccharide PPV23 03/29/2022, 03/29/2022    Respiratory Syncytial Virus Vaccine (Recombinant) 02/21/2024       Objective     /78 (BP Location: Left arm, Patient Position: Sitting, Cuff Size: Standard)   Pulse 61   Temp 97.6 °F (36.4 °C)   Resp 18   Ht 5' 2\" (1.575 m)   Wt 71.7 kg (158 lb)   LMP 02/01/1990 (Within Weeks)   SpO2 98%   BMI 28.90 kg/m²     Physical Exam  Constitutional:       Appearance: Normal appearance.   HENT:      Head: Normocephalic and atraumatic.      Nose: Nose normal.   Eyes:      Conjunctiva/sclera: Conjunctivae normal.   Cardiovascular:      Rate and Rhythm: Normal rate and regular rhythm.      Heart sounds: Normal heart sounds.   Pulmonary:      Effort: Pulmonary effort is normal.      Breath sounds: Normal breath sounds.   Musculoskeletal:         General: Normal range of motion.      Cervical back: Normal range of motion.      Right lower leg: Edema (trace pitting to below knee) present.      Left lower leg: Edema (1+ pitting to below knee) present.   Skin:     General: Skin is warm and dry.   Neurological:      Mental Status: She is alert and oriented to person, place, and time.   Psychiatric:         Behavior: Behavior normal.       Apple Lobato MD    "

## 2024-05-06 NOTE — TELEPHONE ENCOUNTER
I called and spoke with patient's daughter Coral today.  I reviewed the results of MRI abdomen from 4/2024.  I also reviewed CT scan renal protocol from March 2024 patient had a consultation with interventional radiology last month regarding cryoablation of renal lesions suspicious for cancer this was deferred she is scheduled for repeat CT scan of abdomen this month.  Her MRI abdomen showed 17 mm left interpolar renal mass suspicious for renal cell carcinoma.  3.1 cm multiloculated pancreatic cyst-branch duct IPMN favored.  Per daughter patient is overwhelmed with the news of renal lesion she has an appointment to see GI next month.  Deferred referral to surgical oncology at this time pending GI input.      Procedure: MRI abdomen w wo contrast and mrcp    Result Date: 4/24/2024  Narrative: MRI OF THE ABDOMEN WITH AND WITHOUT CONTRAST WITH MRCP INDICATION: 78 years / Female. R93.5: Abnormal findings on diagnostic imaging of other abdominal regions, including retroperitoneum. COMPARISON: CT renal protocol 3/26/2024. Renal ultrasound 2/27/2024. Ultrasound elastography 12/20/2023. Right upper quadrant ultrasound 8/5/2023. TECHNIQUE: Multiplanar/multisequence MRI of the abdomen with 3D MRCP was performed before and after administration of contrast. IV Contrast: 7 mL of Gadobutrol injection (SINGLE-DOSE) DIAGNOSTIC QUALITY: Postcontrast images are motion limited. FINDINGS: LOWER CHEST: Small hiatal hernia. LIVER: Hepatomegaly. Mild hepatic steatosis better quantified on the prior ultrasound elastography. No suspicious mass. Patent hepatic and portal veins. BILE DUCTS: No intrahepatic or extrahepatic bile duct dilation. Common bile duct is normal in caliber. No choledocholithiasis, biliary stricture or suspicious mass. GALLBLADDER: A known gallbladder polyp is not well visualized on this study as the gallbladder is predominantly decompressed. No cholelithiasis or pericholecystic inflammatory findings. PANCREAS: Moderate  diffuse parenchymal atrophy. Multiloculated pancreatic head cystic lesion measuring 3.1 x 2.1 cm, unchanged when remeasured with identical technique on the comparison CT. No visualized enhancing nodular components; postcontrast images are moderately limited by motion artifact. Suspected communication with the main pancreatic duct #11/67-72. No central scar calcification Other smaller scattered subcentimeter simple cysts without suspicious components. No pancreatic ductal dilation. ADRENAL GLANDS: Unremarkable. SPLEEN: Normal spleen. Adjacent splenule. KIDNEYS/PROXIMAL URETERS: No hydroureteronephrosis. SOLID RENAL MASS #1. #13/67 and 14/21. Location: LEFT interpolar, medially located. Size: 1.7 cm. Given differences in measuring technique, no significant change from prior. Macroscopic fat: No. Necrosis: No. Solid enhancement: Yes, throughout entire mass. Mass margins: Circumscribed. Capsular location:  Exophytic by >50%. Distance to sinus fat/collecting system: Extends into the renal sinus. Tumor thrombus: None. [place] venous thrombus: None. BOWEL: No dilated loops of bowel. PERITONEUM/RETROPERITONEUM: No ascites. LYMPH NODES: No abdominal lymphadenopathy. VESSELS: No aneurysm. ABDOMINAL WALL: Unremarkable BONES: No suspicious osseous lesion.     Impression: Motion limited postcontrast imaging. Enhancing solid 17 mm medially located left interpolar renal mass suspicious for renal cell carcinoma. 3.1 cm multiloculated pancreatic head cyst; branch duct IPMN is favored to serous cystadenoma. Recommend gastroenterology and/or surgical oncology consult. EUS may now be warranted. The study was marked in EPIC for significant notification. Workstation performed: [id number]

## 2024-05-06 NOTE — TELEPHONE ENCOUNTER
Caller: Farnaz Martin     Doctor: Dr. Cunningham     Reason for call: patient is going to be having a procedure done to remove a tumor on her left kidney. She was last seen by Dr. Cunningham, 3/2024. Could someone please call her back if she does not need to see  For clearance or if she needs to set up an appt. She wanted to know if it is concerning that she is having swelling in both legs but more on left side and is wondering if it is due to the tumor being on the same side? Will be seeing PCP today as well. She is also requesting Eloquis sample, 5mg.     Call back#: [phone]

## 2024-05-06 NOTE — TELEPHONE ENCOUNTER
Patient calling to find out what blood work she needs prior to her CT.    Informed her she as an order for BMP to get done prior.

## 2024-05-06 NOTE — TELEPHONE ENCOUNTER
"Pt c/o b/l ankle edema, R>L for about 2-3 weeks.  She denies any other symptoms.  She has a h/o CHF, she denies any weight gain. Appt made for today with PCP for evaluation.     Reason for Disposition   MODERATE swelling of both ankles (e.g., swelling extends up to the knees) AND new-onset or worsening    Answer Assessment - Initial Assessment Questions  1. ONSET: \"When did the swelling start?\" (e.g., minutes, hours, days)      Over the last few months, getting worse over the last few weeks getting worse   2. LOCATION: \"What part of the leg is swollen?\"  \"Are both legs swollen or just one leg?\"      B/l, R>L  3. SEVERITY: \"How bad is the swelling?\" (e.g., localized; mild, moderate, severe)   - Localized - small area of swelling localized to one leg   - MILD pedal edema - swelling limited to foot and ankle, pitting edema < 1/4 inch (6 mm) deep, rest and elevation eliminate most or all swelling   - MODERATE edema - swelling of lower leg to knee, pitting edema > 1/4 inch (6 mm) deep, rest and elevation only partially reduce swelling   - SEVERE edema - swelling extends above knee, facial or hand swelling present       Ankles   4. REDNESS: \"Does the swelling look red or infected?\"      no  5. PAIN: \"Is the swelling painful to touch?\" If Yes, ask: \"How painful is it?\"   (Scale 1-10; mild, moderate or severe)      no  6. FEVER: \"Do you have a fever?\" If Yes, ask: \"What is it, how was it measured, and when did it start?\"       no  7. CAUSE: \"What do you think is causing the leg swelling?\"      I don't know   8. MEDICAL HISTORY: \"Do you have a history of heart failure, kidney disease, liver failure, or cancer?\"      CHF  9. RECURRENT SYMPTOM: \"Have you had leg swelling before?\" If Yes, ask: \"When was the last time?\" \"What happened that time?\"      no  10. OTHER SYMPTOMS: \"Do you have any other symptoms?\" (e.g., chest pain, difficulty breathing)        no  11. PREGNANCY: \"Is there any chance you are pregnant?\" \"When was your " "last menstrual period?\"        no    Protocols used: Leg Swelling and Edema-ADULT-[place]    "

## 2024-05-06 NOTE — TELEPHONE ENCOUNTER
Patient called in wanting to go discuss MRI results with Dr. Monsivais after he has a chance to review them. Patient also stated she has been having leg swelling and was transferred to C[facility] for triage.

## 2024-05-07 ENCOUNTER — APPOINTMENT (OUTPATIENT)
Dept: LAB | Facility: MEDICAL CENTER | Age: 79
End: 2024-05-07
Payer: MEDICARE

## 2024-05-07 ENCOUNTER — APPOINTMENT (OUTPATIENT)
Dept: RADIOLOGY | Facility: MEDICAL CENTER | Age: 79
End: 2024-05-07
Payer: MEDICARE

## 2024-05-07 DIAGNOSIS — R31.29 MICROSCOPIC HEMATURIA: ICD-10-CM

## 2024-05-07 DIAGNOSIS — I50.32 CHRONIC DIASTOLIC CHF (CONGESTIVE HEART FAILURE) (HCC): ICD-10-CM

## 2024-05-07 LAB
ANION GAP SERPL CALCULATED.3IONS-SCNC: 11 MMOL/L (ref 4–13)
BUN SERPL-MCNC: 29 MG/DL (ref 5–25)
CALCIUM SERPL-MCNC: 9.8 MG/DL (ref 8.4–10.2)
CHLORIDE SERPL-SCNC: 96 MMOL/L (ref 96–108)
CO2 SERPL-SCNC: 27 MMOL/L (ref 21–32)
CREAT SERPL-MCNC: 0.95 MG/DL (ref 0.6–1.3)
EST. AVERAGE GLUCOSE BLD GHB EST-MCNC: 209 MG/DL
GFR SERPL CREATININE-BSD FRML MDRD: 57 ML/MIN/1.73SQ M
GLUCOSE P FAST SERPL-MCNC: 185 MG/DL (ref 65–99)
HBA1C MFR BLD: 8.9 %
POTASSIUM SERPL-SCNC: 4.6 MMOL/L (ref 3.5–5.3)
SODIUM SERPL-SCNC: 134 MMOL/L (ref 135–147)

## 2024-05-07 PROCEDURE — 71046 X-RAY EXAM CHEST 2 VIEWS: CPT

## 2024-05-07 PROCEDURE — 80048 BASIC METABOLIC PNL TOTAL CA: CPT

## 2024-05-07 NOTE — ASSESSMENT & PLAN NOTE
Likely 2/2 venous insufficiency vs medication side effect (Cartia) vs CHF exacerbation.   Wells Score: -2 ::: low risk for DVT  Have advised elevation of legs when sitting or supine  DME compression stocking ordered  Please also see plan for CHF

## 2024-05-07 NOTE — ASSESSMENT & PLAN NOTE
Wt Readings from Last 3 Encounters:   05/06/24 71.7 kg (158 lb)   04/16/24 68 kg (150 lb)   03/28/24 71.2 kg (157 lb)     Managed on Metoprolol 100 mg BID and Lasix 20 mg QD  Will increase Lasix dose to BID for 5 days in the setting or increased b/l LE edema   Will obtain imaging for rule out exacerbation although unlikely as patient denies chest pain or sob and lungs CTAB.

## 2024-05-08 ENCOUNTER — TELEPHONE (OUTPATIENT)
Dept: FAMILY MEDICINE CLINIC | Facility: CLINIC | Age: 79
End: 2024-05-08

## 2024-05-08 DIAGNOSIS — E11.29 TYPE 2 DIABETES MELLITUS WITH MICROALBUMINURIA, WITHOUT LONG-TERM CURRENT USE OF INSULIN (HCC): Primary | ICD-10-CM

## 2024-05-08 DIAGNOSIS — R80.9 TYPE 2 DIABETES MELLITUS WITH MICROALBUMINURIA, WITHOUT LONG-TERM CURRENT USE OF INSULIN (HCC): Primary | ICD-10-CM

## 2024-05-08 NOTE — TELEPHONE ENCOUNTER
FYI    Patient was informed of message, she stated that she think she would like to hold off on the medication for a little bit.

## 2024-05-08 NOTE — TELEPHONE ENCOUNTER
Pt was seen by her pcp on Monday and was advised to use compressions stocking and they have increaseD lasix 20 mg one tablet twice daily for 5 days.   Per pt she has noticed a difference.       Pt will stop in tomorrow to  eliquis samples    Eliquis 5 mg   Lot:[id number]  Exp:09/2025  Count dispensed:4

## 2024-05-08 NOTE — TELEPHONE ENCOUNTER
Call re labs  A1c 8.9-a  blood test that reflects an average of blood sugars over the last 3 months-Goal 7 or less. This indicates the need for medication for diabetes  this would be an oral medication if agreeable let me know and I will send in a script.  She will need a repeat A1c in 3 months.    Recent Results (from the past 168 hour(s))   Hemoglobin A1C    Collection Time: 05/07/24  8:38 AM   Result Value Ref Range    Hemoglobin A1C 8.9 (H) Normal 4.0-5.6%; PreDiabetic 5.7-6.4%; Diabetic >=6.5%; Glycemic control for adults with diabetes <7.0% %     mg/dl   Basic metabolic panel    Collection Time: 05/07/24  8:38 AM   Result Value Ref Range    Sodium 134 (L) 135 - 147 mmol/L    Potassium 4.6 3.5 - 5.3 mmol/L    Chloride 96 96 - 108 mmol/L    CO2 27 21 - 32 mmol/L    ANION GAP 11 4 - 13 mmol/L    BUN 29 (H) 5 - 25 mg/dL    Creatinine 0.95 0.60 - 1.30 mg/dL    Glucose, Fasting 185 (H) 65 - 99 mg/dL    Calcium 9.8 8.4 - 10.2 mg/dL    eGFR 57 ml/min/1.73sq m

## 2024-05-13 ENCOUNTER — OFFICE VISIT (OUTPATIENT)
Dept: NEUROLOGY | Facility: CLINIC | Age: 79
End: 2024-05-13
Payer: MEDICARE

## 2024-05-13 VITALS
BODY MASS INDEX: 28.89 KG/M2 | OXYGEN SATURATION: 98 % | TEMPERATURE: 98 F | SYSTOLIC BLOOD PRESSURE: 117 MMHG | DIASTOLIC BLOOD PRESSURE: 64 MMHG | WEIGHT: 157 LBS | HEART RATE: 72 BPM | HEIGHT: 62 IN

## 2024-05-13 DIAGNOSIS — G62.9 PERIPHERAL POLYNEUROPATHY: ICD-10-CM

## 2024-05-13 DIAGNOSIS — R25.1 TREMORS OF NERVOUS SYSTEM: Primary | ICD-10-CM

## 2024-05-13 DIAGNOSIS — D47.2 MGUS (MONOCLONAL GAMMOPATHY OF UNKNOWN SIGNIFICANCE): ICD-10-CM

## 2024-05-13 DIAGNOSIS — D32.9 MENINGIOMA (HCC): ICD-10-CM

## 2024-05-13 PROCEDURE — 99214 OFFICE O/P EST MOD 30 MIN: CPT | Performed by: NURSE PRACTITIONER

## 2024-05-13 NOTE — PROGRESS NOTES
Patient ID: Farnaz Martin is a 78 y.o. female.    Assessment/Plan:    Peripheral neuropathy  Patient remains stable in regards to her neuropathy.  Her exam is stable from last visit.  She does continue with burning type sensation in a band type region in her thoracic spine.  MRI thoracic spine did reveal mild mild spondylosis and some postural kyphosis on exam which may be contributing.  This symptom is currently stable and patient denies any significant discomfort related to this.    We did again review workup for her neuropathy which did show a suboptimal B12 and she is on supplementation.  She is not diabetic and most recent A1c was elevated at 8.9.  She has made dietary changes and if no improvement her PCP will make adjustments to her medications.  She does have MGUS in which her recent labs appeared stable, she has to schedule follow-up with hematology to review.    Her neuropathic pain is currently well-controlled on duloxetine 30 mg daily and gabapentin 300 mg at bedtime.    Plan follow-up in 6 months. To contact the office sooner with any concerns or worsening symptoms.          Meningioma (HCC)  Recent MRI brain showed stable meningioma, currently following with neurosurgery at [facility].  She is to have MRI brain in November 2024.  She denies any new neurologic complaints.    Tremors of nervous system  Patient's tremors with mild progression since last seen.  She continues with action/intention tremor, no parkinsonian features, likely essential tremor.  She does have family history of tremor as well.  At this time she does not feel tremors greatly impacting her functioning therefore does not wish to adjust or add on any medications.  She is currently on beta-blocker and gabapentin.  Higher doses of gabapentin did not affect her tremor.  Given her newly found renal mass will be cautious with topiramate.  She could consider low doses of primidone in the future if needed (patient is on Eliquis).         Diagnoses and all orders for this visit:    Tremors of nervous system    Peripheral polyneuropathy    MGUS (monoclonal gammopathy of unknown significance)    Meningioma (HCC)           Subjective:    HPI    Patient is a 78-year-old woman who was referred for evaluation for thoracic pain, as well as for tremors.    To review patient  was initially seen by Dr. Miguel in April 2021 for initial consult-reported she has had a warm sensation around her abdomen, which started in 2020. Sensation was described as a heat like a band that started from thoracic region, would radiate to both sides of the abdomen along the rib cage, and came all the way in the front.  Symptoms are bilateral and intermitteny     She also complained tremors in both hands. Does have family history of essential tremors in brother who is s/p DBS placement. Noted 1 year history of diplopia without ptosis in which she had been evaluated by ophthalmology and was given prisms.      She does have a history of fibromyalgia and low back pain in which she is following with pain management and is on gabapentin.     Last office visit 11/2023 in which no changse were made.      Interval History:   Neuropathy is the same, numbness and tingling in the feet.   No recent falls, some imbalance.  No muscle weakness.  No UE symptoms.    She continues with band type sensation around the waist. Is positional-really only when she is sitting. Burning type sensation. She is used to the discomfort/sensation. No change.     Tremors have slightly worsened since last visit. No difficulty with eating and drinking, sloppy handwriting. Can write a check and sign her name.  No issues with ADLs, some difficulty with buttons and jewelry.   No head tremor or vocal tremor.      She on duloxetine 20 mg daily and gabapentin 300 mg at bedtime-helps with pain.    She continues to follow with hematology for MGUS, recent labs 1/2024 were stable.    follows with [facility] neurosurgey for  "meningimoa- MRi has been stable, due in november 2024       Also hospitalized for A-fib and August 2023 does follow with cardiology on Eliquis.,   She also had a knee replacement in July 2023.     most recent a1c 8.9 5/2024     Work up Completed:   labs: 7/2023 lipid panel normal, a1c 6.6     She did have repeat labs for MGUS- spep with monoclonal peak in the gamma region, immunofixation with elevated IgKappa   beta 2 2.4   IgE, IGG, IGM normal  [id number]  Blood work done in the hospital included normal TSH.  CBC, CMP were normal.   Copper, Ceruloplasmin, acetylcholine receptor antibodies, B6-normal  B12 399, MMA elevated at 44  Serum immunofixation: monoclonal gammopathy identified as IgG lambda (0.26 g/dL)-MGUS     MRI of the thoracic spine performed in September 2020: mild spondylosis and osteoarthritis, without any evidence for cord compression, or any neuroforaminal compression.     MRI brain 10/2023: Stable meningioma within the left anterior lateral anterior cranial fossa.   Stable advanced chronic microangiopathic change.      The following portions of the patient's history were reviewed and updated as appropriate: allergies, current medications, past family history, past medical history, past social history, past surgical history and problem list.          Objective:    Blood pressure 117/64, pulse 72, temperature 98 °F (36.7 °C), temperature source Temporal, height 5' 2\" (1.575 m), weight 71.2 kg (157 lb), last menstrual period 02/01/1990, SpO2 98%, not currently breastfeeding.    Physical Exam  Constitutional:       General: She is awake.   HENT:      Right Ear: Hearing normal.      Left Ear: Hearing normal.   Eyes:      General: Lids are normal.      Pupils: Pupils are equal, round, and reactive to light.   Neurological:      Mental Status: She is alert.      Deep Tendon Reflexes:      Reflex Scores:       Bicep reflexes are 2+ on the right side.       Brachioradialis reflexes are 2+ on the right side and " 2+ on the left side.       Patellar reflexes are 2+ on the right side.  Psychiatric:         Speech: Speech normal.         Neurological Exam  Mental Status  Awake and alert. Oriented to person, place, time and situation. Speech is normal. Language is fluent with no aphasia.    Cranial Nerves  CN III, IV, VI: Normal lids and orbits bilaterally. Pupils equal round and reactive to light bilaterally. No diplopia, EOM intact during exat, however intermittent esotropia noted in the right eye during primary gaze at times.  CN V:  Right: Facial sensation is normal.  Left: Facial sensation is normal on the left.  CN VII:  Right: There is no facial weakness.  Left: There is no facial weakness.  CN VIII:  Right: Hearing is normal.  Left: Hearing is normal.  CN IX, X: Palate elevates symmetrically  CN XI:  Right: Trapezius strength is normal.  Left: Trapezius strength is normal.  CN XII: Tongue midline without atrophy or fasciculations.    Motor  Normal muscle bulk throughout.                                               Right                     Left  Elbow flexion                         5                          5  Elbow extension                    5                          5  Wrist flexion                           5                          5  Wrist extension                      5                          5  Finger abduction                    5                          5  Hip flexion                              5                          5  Knee flexion                           5                          5  Knee extension                      5                          5  Plantarflexion                         5                          5  Dorsiflexion                            5                          5    Sensory  Light touch is normal in upper and lower extremities. Pinprick abnormality: Normal in bilateral UE, diminished to ankles in b/l LE  . Temperature abnormality: Normal in bilateral UE, diminished to  the ankles in b/l LE. Vibration abnormality: Normal in  UE, reduced at b/l great toe (5-6 secs)    . Proprioception is normal in upper and lower extremities.     Reflexes                                            Right                      Left  Brachioradialis                    2+                         2+  Biceps                                 2+                          Patellar                                2+                         Tr  Achilles                                Tr                         Tr  Right Plantar: downgoing  Left Plantar: downgoing    Coordination    No resting or postural tremor, Finger to nose normal except mild tremor noted when approaching nose bilaterally. No bradykinesia, rigidity, cogwheeling.   No head or vocal tremor. .    Gait  Casual gait is normal including stance, stride, and arm swing. Able to rise from chair without using arms.      I have personally reviewed the ROS performed by the MA.    ROS:    Review of Systems   Constitutional:  Negative for appetite change, fatigue and fever.   HENT: Negative.  Negative for hearing loss, tinnitus, trouble swallowing and voice change.    Eyes: Negative.  Negative for photophobia, pain and visual disturbance.   Respiratory: Negative.  Negative for shortness of breath.    Cardiovascular: Negative.  Negative for palpitations.   Gastrointestinal: Negative.  Negative for nausea and vomiting.   Endocrine: Negative.  Negative for cold intolerance.   Genitourinary: Negative.  Negative for dysuria, frequency and urgency.   Musculoskeletal:  Negative for back pain, gait problem, myalgias, neck pain and neck stiffness.   Skin: Negative.  Negative for rash.   Allergic/Immunologic: Negative.    Neurological:  Positive for tremors (tad bit worse) and numbness. Negative for dizziness, seizures, syncope, facial asymmetry, speech difficulty, weakness, light-headedness and headaches.   Hematological: Negative.  Does not bruise/bleed easily.    Psychiatric/Behavioral: Negative.  Negative for confusion, hallucinations and sleep disturbance.    All other systems reviewed and are negative.

## 2024-05-13 NOTE — ASSESSMENT & PLAN NOTE
Patient's tremors with mild progression since last seen.  She continues with action/intention tremor, no parkinsonian features, likely essential tremor.  She does have family history of tremor as well.  At this time she does not feel tremors greatly impacting her functioning therefore does not wish to adjust or add on any medications.  She is currently on beta-blocker and gabapentin.  Higher doses of gabapentin did not affect her tremor.  Given her newly found renal mass will be cautious with topiramate.  She could consider low doses of primidone in the future if needed (patient is on Eliquis).

## 2024-05-13 NOTE — ASSESSMENT & PLAN NOTE
Patient remains stable in regards to her neuropathy.  Her exam is stable from last visit.  She does continue with burning type sensation in a band type region in her thoracic spine.  MRI thoracic spine did reveal mild mild spondylosis and some postural kyphosis on exam which may be contributing.  This symptom is currently stable and patient denies any significant discomfort related to this.    We did again review workup for her neuropathy which did show a suboptimal B12 and she is on supplementation.  She is not diabetic and most recent A1c was elevated at 8.9.  She has made dietary changes and if no improvement her PCP will make adjustments to her medications.  She does have MGUS in which her recent labs appeared stable, she has to schedule follow-up with hematology to review.    Her neuropathic pain is currently well-controlled on duloxetine 30 mg daily and gabapentin 300 mg at bedtime.    Plan follow-up in 6 months. To contact the office sooner with any concerns or worsening symptoms.

## 2024-05-13 NOTE — ASSESSMENT & PLAN NOTE
Neuropathy is the same, numbness and tingling in the feet.   No recent falls, some imbalance.  No muscle weakness.  No UE symptoms.    She continues with band type sensation around the waist. Is positional-really only when she is sitting. Burning type sensation. She is used to the discomfort/sensation. No change.     Tremors have slightly worsened since last visit. No difficulty with eating and drinking, sloppy handwriting. Can write a check and sign her name.  No issues with ADLs, some difficulty with buttons and jewelry.   No head tremor or vocal tremor.      She on duloxetine 20 mg daily and gabapentin 300 mg at bedtime-helps with pain.

## 2024-05-13 NOTE — ASSESSMENT & PLAN NOTE
Recent MRI brain showed stable meningioma, currently following with neurosurgery at [facility].  She is to have MRI brain in November 2024.  She denies any new neurologic complaints.

## 2024-05-13 NOTE — PATIENT INSTRUCTIONS
Schedule appt with hematology     Work on blood sugar control    If tremor worsens could consider adding low dose primidone

## 2024-05-14 ENCOUNTER — HOSPITAL ENCOUNTER (OUTPATIENT)
Dept: RADIOLOGY | Facility: MEDICAL CENTER | Age: 79
Discharge: HOME | End: 2024-05-14
Payer: MEDICARE

## 2024-05-14 DIAGNOSIS — N28.89 RENAL MASS, LEFT: ICD-10-CM

## 2024-05-14 PROCEDURE — 74160 CT ABDOMEN W/CONTRAST: CPT

## 2024-05-14 RX ADMIN — IOHEXOL 100 ML: 350 INJECTION, SOLUTION INTRAVENOUS at 09:32

## 2024-05-20 ENCOUNTER — APPOINTMENT (EMERGENCY)
Dept: RADIOLOGY | Facility: HOSPITAL | Age: 79
DRG: 292 | End: 2024-05-20
Payer: MEDICARE

## 2024-05-20 ENCOUNTER — HOSPITAL ENCOUNTER (INPATIENT)
Facility: HOSPITAL | Age: 79
LOS: 1 days | Discharge: HOME | DRG: 292 | End: 2024-05-22
Attending: EMERGENCY MEDICINE | Admitting: INTERNAL MEDICINE
Payer: MEDICARE

## 2024-05-20 ENCOUNTER — TELEPHONE (OUTPATIENT)
Age: 79
End: 2024-05-20

## 2024-05-20 DIAGNOSIS — I48.91 ATRIAL FIBRILLATION WITH RAPID VENTRICULAR RESPONSE (HCC): Primary | ICD-10-CM

## 2024-05-20 DIAGNOSIS — E87.1 HYPONATREMIA: ICD-10-CM

## 2024-05-20 DIAGNOSIS — Z95.0 PACEMAKER: ICD-10-CM

## 2024-05-20 LAB
2HR DELTA HS TROPONIN: 0 NG/L
4HR DELTA HS TROPONIN: 0 NG/L
ALBUMIN SERPL BCP-MCNC: 4 G/DL (ref 3.5–5)
ALP SERPL-CCNC: 80 U/L (ref 34–104)
ALT SERPL W P-5'-P-CCNC: 36 U/L (ref 7–52)
ANION GAP SERPL CALCULATED.3IONS-SCNC: 9 MMOL/L (ref 4–13)
AST SERPL W P-5'-P-CCNC: 23 U/L (ref 13–39)
ATRIAL RATE: 111 BPM
ATRIAL RATE: 118 BPM
BASOPHILS # BLD AUTO: 0.06 THOUSANDS/ÂΜL (ref 0–0.1)
BASOPHILS NFR BLD AUTO: 1 % (ref 0–1)
BILIRUB SERPL-MCNC: 0.89 MG/DL (ref 0.2–1)
BUN SERPL-MCNC: 24 MG/DL (ref 5–25)
CALCIUM SERPL-MCNC: 9.1 MG/DL (ref 8.4–10.2)
CARDIAC TROPONIN I PNL SERPL HS: 5 NG/L
CHLORIDE SERPL-SCNC: 91 MMOL/L (ref 96–108)
CO2 SERPL-SCNC: 25 MMOL/L (ref 21–32)
CREAT SERPL-MCNC: 1.05 MG/DL (ref 0.6–1.3)
EOSINOPHIL # BLD AUTO: 0.12 THOUSAND/ÂΜL (ref 0–0.61)
EOSINOPHIL NFR BLD AUTO: 2 % (ref 0–6)
ERYTHROCYTE [DISTWIDTH] IN BLOOD BY AUTOMATED COUNT: 14.4 % (ref 11.6–15.1)
GFR SERPL CREATININE-BSD FRML MDRD: 50 ML/MIN/1.73SQ M
GLUCOSE SERPL-MCNC: 211 MG/DL (ref 65–140)
HCT VFR BLD AUTO: 36.6 % (ref 34.8–46.1)
HGB BLD-MCNC: 12.4 G/DL (ref 11.5–15.4)
IMM GRANULOCYTES # BLD AUTO: 0.05 THOUSAND/UL (ref 0–0.2)
IMM GRANULOCYTES NFR BLD AUTO: 1 % (ref 0–2)
LYMPHOCYTES # BLD AUTO: 1.39 THOUSANDS/ÂΜL (ref 0.6–4.47)
LYMPHOCYTES NFR BLD AUTO: 17 % (ref 14–44)
MCH RBC QN AUTO: 30.8 PG (ref 26.8–34.3)
MCHC RBC AUTO-ENTMCNC: 33.9 G/DL (ref 31.4–37.4)
MCV RBC AUTO: 91 FL (ref 82–98)
MONOCYTES # BLD AUTO: 0.78 THOUSAND/ÂΜL (ref 0.17–1.22)
MONOCYTES NFR BLD AUTO: 10 % (ref 4–12)
NEUTROPHILS # BLD AUTO: 5.78 THOUSANDS/ÂΜL (ref 1.85–7.62)
NEUTS SEG NFR BLD AUTO: 69 % (ref 43–75)
NRBC BLD AUTO-RTO: 0 /100 WBCS
PLATELET # BLD AUTO: 227 THOUSANDS/UL (ref 149–390)
PMV BLD AUTO: 10.4 FL (ref 8.9–12.7)
POTASSIUM SERPL-SCNC: 4.1 MMOL/L (ref 3.5–5.3)
PROT SERPL-MCNC: 7 G/DL (ref 6.4–8.4)
QRS AXIS: -64 DEGREES
QRS AXIS: -86 DEGREES
QRSD INTERVAL: 180 MS
QRSD INTERVAL: 194 MS
QT INTERVAL: 436 MS
QT INTERVAL: 476 MS
QTC INTERVAL: 576 MS
QTC INTERVAL: 667 MS
RBC # BLD AUTO: 4.02 MILLION/UL (ref 3.81–5.12)
SODIUM SERPL-SCNC: 125 MMOL/L (ref 135–147)
T WAVE AXIS: 105 DEGREES
T WAVE AXIS: 99 DEGREES
TSH SERPL DL<=0.05 MIU/L-ACNC: 2.11 UIU/ML (ref 0.45–4.5)
VENTRICULAR RATE: 105 BPM
VENTRICULAR RATE: 118 BPM
WBC # BLD AUTO: 8.18 THOUSAND/UL (ref 4.31–10.16)

## 2024-05-20 PROCEDURE — 99223 1ST HOSP IP/OBS HIGH 75: CPT | Performed by: INTERNAL MEDICINE

## 2024-05-20 PROCEDURE — 99285 EMERGENCY DEPT VISIT HI MDM: CPT

## 2024-05-20 PROCEDURE — 99291 CRITICAL CARE FIRST HOUR: CPT | Performed by: EMERGENCY MEDICINE

## 2024-05-20 PROCEDURE — 84484 ASSAY OF TROPONIN QUANT: CPT

## 2024-05-20 PROCEDURE — 93005 ELECTROCARDIOGRAM TRACING: CPT

## 2024-05-20 PROCEDURE — 96374 THER/PROPH/DIAG INJ IV PUSH: CPT

## 2024-05-20 PROCEDURE — 80053 COMPREHEN METABOLIC PANEL: CPT

## 2024-05-20 PROCEDURE — 85025 COMPLETE CBC W/AUTO DIFF WBC: CPT

## 2024-05-20 PROCEDURE — 96375 TX/PRO/DX INJ NEW DRUG ADDON: CPT

## 2024-05-20 PROCEDURE — 71045 X-RAY EXAM CHEST 1 VIEW: CPT

## 2024-05-20 PROCEDURE — 84443 ASSAY THYROID STIM HORMONE: CPT

## 2024-05-20 PROCEDURE — 36415 COLL VENOUS BLD VENIPUNCTURE: CPT

## 2024-05-20 RX ORDER — LOSARTAN POTASSIUM 50 MG/1
50 TABLET ORAL 2 TIMES DAILY
Status: DISCONTINUED | OUTPATIENT
Start: 2024-05-20 | End: 2024-05-21

## 2024-05-20 RX ORDER — FLECAINIDE ACETATE 50 MG/1
100 TABLET ORAL 2 TIMES DAILY
Status: DISCONTINUED | OUTPATIENT
Start: 2024-05-20 | End: 2024-05-21

## 2024-05-20 RX ORDER — METOPROLOL SUCCINATE 100 MG/1
100 TABLET, EXTENDED RELEASE ORAL EVERY 12 HOURS
Status: DISCONTINUED | OUTPATIENT
Start: 2024-05-20 | End: 2024-05-22 | Stop reason: HOSPADM

## 2024-05-20 RX ORDER — ROPINIROLE 1 MG/1
2 TABLET, FILM COATED ORAL
Status: DISCONTINUED | OUTPATIENT
Start: 2024-05-20 | End: 2024-05-22 | Stop reason: HOSPADM

## 2024-05-20 RX ORDER — DILTIAZEM HYDROCHLORIDE 60 MG/1
60 TABLET, FILM COATED ORAL EVERY 6 HOURS SCHEDULED
Status: DISCONTINUED | OUTPATIENT
Start: 2024-05-20 | End: 2024-05-21

## 2024-05-20 RX ORDER — ZOLPIDEM TARTRATE 5 MG/1
10 TABLET ORAL
Status: DISCONTINUED | OUTPATIENT
Start: 2024-05-20 | End: 2024-05-22 | Stop reason: HOSPADM

## 2024-05-20 RX ORDER — EZETIMIBE 10 MG/1
10 TABLET ORAL DAILY
Status: DISCONTINUED | OUTPATIENT
Start: 2024-05-21 | End: 2024-05-22 | Stop reason: HOSPADM

## 2024-05-20 RX ORDER — FUROSEMIDE 20 MG/1
20 TABLET ORAL DAILY
Status: DISCONTINUED | OUTPATIENT
Start: 2024-05-21 | End: 2024-05-22 | Stop reason: HOSPADM

## 2024-05-20 RX ORDER — DILTIAZEM HYDROCHLORIDE 5 MG/ML
15 INJECTION INTRAVENOUS ONCE
Status: COMPLETED | OUTPATIENT
Start: 2024-05-20 | End: 2024-05-20

## 2024-05-20 RX ORDER — GABAPENTIN 300 MG/1
300 CAPSULE ORAL
Status: DISCONTINUED | OUTPATIENT
Start: 2024-05-20 | End: 2024-05-22 | Stop reason: HOSPADM

## 2024-05-20 RX ORDER — ALBUTEROL SULFATE 90 UG/1
2 AEROSOL, METERED RESPIRATORY (INHALATION) EVERY 6 HOURS PRN
Status: DISCONTINUED | OUTPATIENT
Start: 2024-05-20 | End: 2024-05-22 | Stop reason: HOSPADM

## 2024-05-20 RX ORDER — IPRATROPIUM BROMIDE 21 UG/1
2 SPRAY, METERED NASAL 3 TIMES DAILY
Status: DISCONTINUED | OUTPATIENT
Start: 2024-05-20 | End: 2024-05-21

## 2024-05-20 RX ORDER — SODIUM CHLORIDE, SODIUM GLUCONATE, SODIUM ACETATE, POTASSIUM CHLORIDE, MAGNESIUM CHLORIDE, SODIUM PHOSPHATE, DIBASIC, AND POTASSIUM PHOSPHATE .53; .5; .37; .037; .03; .012; .00082 G/100ML; G/100ML; G/100ML; G/100ML; G/100ML; G/100ML; G/100ML
1000 INJECTION, SOLUTION INTRAVENOUS ONCE
Status: COMPLETED | OUTPATIENT
Start: 2024-05-20 | End: 2024-05-20

## 2024-05-20 RX ORDER — ACETYLCYSTEINE 200 MG/ML
4 SOLUTION ORAL; RESPIRATORY (INHALATION) EVERY 4 HOURS
Status: DISCONTINUED | OUTPATIENT
Start: 2024-05-20 | End: 2024-05-21

## 2024-05-20 RX ADMIN — ZOLPIDEM TARTRATE 10 MG: 5 TABLET ORAL at 21:41

## 2024-05-20 RX ADMIN — DILTIAZEM HYDROCHLORIDE 7.5 MG/HR: 5 INJECTION INTRAVENOUS at 23:30

## 2024-05-20 RX ADMIN — ROPINIROLE HYDROCHLORIDE 2 MG: 1 TABLET, FILM COATED ORAL at 21:02

## 2024-05-20 RX ADMIN — SODIUM CHLORIDE, SODIUM GLUCONATE, SODIUM ACETATE, POTASSIUM CHLORIDE, MAGNESIUM CHLORIDE, SODIUM PHOSPHATE, DIBASIC, AND POTASSIUM PHOSPHATE 1000 ML: .53; .5; .37; .037; .03; .012; .00082 INJECTION, SOLUTION INTRAVENOUS at 13:51

## 2024-05-20 RX ADMIN — METOPROLOL SUCCINATE 100 MG: 100 TABLET, EXTENDED RELEASE ORAL at 20:53

## 2024-05-20 RX ADMIN — DILTIAZEM HYDROCHLORIDE 2.5 MG/HR: 5 INJECTION INTRAVENOUS at 14:22

## 2024-05-20 RX ADMIN — LOSARTAN POTASSIUM 50 MG: 50 TABLET, FILM COATED ORAL at 20:53

## 2024-05-20 RX ADMIN — APIXABAN 5 MG: 5 TABLET, FILM COATED ORAL at 20:52

## 2024-05-20 RX ADMIN — GABAPENTIN 300 MG: 300 CAPSULE ORAL at 21:02

## 2024-05-20 RX ADMIN — DILTIAZEM HYDROCHLORIDE 15 MG: 5 INJECTION, SOLUTION INTRAVENOUS at 13:06

## 2024-05-20 RX ADMIN — FLECAINIDE ACETATE 100 MG: 50 TABLET ORAL at 20:53

## 2024-05-20 NOTE — ASSESSMENT & PLAN NOTE
Patient presenting with 1 week history of intermittent palpitations with accompanying dyspnea on exertion.  Notes that has had low pressures and elevated heart rates to 120s with blood pressure checks at home.  No notable inciting factors as per patient.  Occasional lightheadedness and ambulatory dysfunction, stated has happened previously with hyponatremia but unsure if related to this event.  Has had some recent increase in lower extremity swelling.  Emergency room workup with normal troponins, CBC unremarkable, CMP with hyponatremia 125.  Noted to have heart rates from 115-120s.  EKG demonstrating ventricularly paced tachycardia. Satting well on room air.  Chest x-ray read independently, right-sided pleural effusion with some increased haziness lung fields.    Plan:  Continuing diltiazem drip, starting diltiazem 60 every 6 hours to transition to oral medications  Continuing with home metoprolol  On Eliquis for anticoagulation  Cardiology consult  TSH  Pacemaker interrogation

## 2024-05-20 NOTE — ED PROVIDER NOTES
"History  Chief Complaint   Patient presents with    Atrial Fibrillation     Pt reports concern for a fib, \"heart racing.\" +fatigue      HPI    77 yo female presents for evaluation of palpitations.  She reports a week of intermittent palpitations, generalized fatigue, chest discomfort and mild dyspnea with minimal activity. She also reports episodes of hypotension in the past week, with lowest BP in the 70s.  Per daughter at bedside, she was alert and oriented, mentating well during these episodes of hypotension.  She has a hx of atrial fibrillation on Eliquis, and has a ventricular pacemaker    Prior to Admission Medications   Prescriptions Last Dose Informant Patient Reported? Taking?   Cartia  MG 24 hr capsule  Self No No   Sig: take 1 capsule by mouth twice a day .YOU MAKE TAKE AN ADDITIONAL CAPSULE DAILY IF YOU HAVE PALPITATIONS AND ELEVATED HEART RATE CONSISTENT WITH YOUR A FIB. MAX 3 CAPS DAILY   Cetirizine HCl (ZyrTEC Allergy) 10 MG CAPS  Self Yes No   Sig: Take 10 mg by mouth in the morning   Patient not taking: Reported on 5/13/2024   Cholecalciferol 50 MCG (2000 UT) CAPS  Self Yes No   Sig: Take 2,000 Units by mouth 2 (two) times a day   Chromium Picolinate (CHROMIUM PICOLATE PO)  Self Yes No   Sig: Take 1 tablet by mouth daily   TURMERIC PO  Self Yes No   Sig: Take 1 capsule by mouth 2 (two) times a day   acetylcysteine (MUCOMYST) 200 mg/mL nebulizer solution  Self No No   Sig: Take 4 mL (800 mg total) by nebulization every 4 (four) hours   albuterol (ProAir HFA) 90 mcg/act inhaler  Self No No   Sig: Inhale 2 puffs every 6 (six) hours as needed for wheezing   apixaban (ELIQUIS) 5 mg  Self No No   Sig: Take 1 tablet (5 mg total) by mouth 2 (two) times a day   ascorbic acid (VITAMIN C) 500 mg tablet  Self Yes No   Sig: Take 500 mg by mouth daily    ezetimibe (ZETIA) 10 mg tablet  Self No No   Sig: take 1 tablet by mouth once daily   flecainide (TAMBOCOR) 100 mg tablet  Self No No   Sig: Take 1 tablet " (100 mg total) by mouth 2 (two) times a day   furosemide (LASIX) 20 mg tablet  Self No No   Sig: Take 1 tablet (20 mg total) by mouth daily take 2 tablet by mouth daily if needed for shortness of breath WEIGHT GAIN 3 LBS IN 1 DAY OR LOWER LEG SWELLING   gabapentin (NEURONTIN) 300 mg capsule  Self No No   Sig: take 1 capsule by mouth at bedtime   ipratropium (ATROVENT) 0.03 % nasal spray  Self No No   Si sprays into each nostril 3 (three) times a day Begin once per day, then progress to twice per day. Progress to three times a day as tolerated.   Patient not taking: Reported on 2024   losartan (COZAAR) 50 mg tablet  Self No No   Sig: take 1 tablet by mouth twice a day   metoprolol succinate (TOPROL-XL) 100 mg 24 hr tablet  Self No No   Sig: Take 1 tablet (100 mg total) by mouth every 12 (twelve) hours   rOPINIRole (REQUIP) 1 mg tablet  Self No No   Sig: Take 2 tablets (2 mg total) by mouth daily at bedtime   sodium chloride 3 % inhalation solution  Self No No   Sig: Take 4 mL by nebulization as needed for cough   zolpidem (AMBIEN) 10 mg tablet  Self No No   Sig: Take 1 tablet (10 mg total) by mouth daily at bedtime as needed for sleep      Facility-Administered Medications: None       Past Medical History:   Diagnosis Date    Actinic keratosis     last assessed - 2014    Arthritis     Atrial fibrillation with rapid ventricular response ([facility])     last assessed - 2016    Basal cell carcinoma     Benign colon polyp     last assessed - 2015    CHF (congestive heart failure) ([facility]) 2022    Disease of thyroid gland     Effusion of knee joint right     Resolved - [id number]    Esophageal reflux     Fibromyalgia     last assessed - [id number]    Fibromyalgia, primary     GERD (gastroesophageal reflux disease)     Hypertension     Palpitations     last assessed - 2013    Peroneal tendonitis, right     last assessed - 2013    Right lumbar radiculopathy 2016    Thyroid cancer ([facility])      Thyroid nodule     Trochanteric bursitis of left hip 03/09/2018       Past Surgical History:   Procedure Laterality Date    CARDIAC ELECTROPHYSIOLOGY STUDY AND ABLATION  08/2022    CATARACT EXTRACTION Bilateral     COLONOSCOPY  03/2018    COLONOSCOPY W/ POLYPECTOMY  2021    repeat in 5 years    EYE SURGERY      HYSTERECTOMY      JOINT REPLACEMENT Left     knee    OOPHORECTOMY      CO NEUROPLASTY &/TRANSPOS MEDIAN NRV CARPAL TUNNE Right 11/14/2019    Procedure: RELEASE CARPAL TUNNEL;  Surgeon: Onesimo Tate MD;  Location: [facility];  Service: Orthopedics    CO TOTAL THYROID LOBECTOMY UNI W/WO ISTHMUSECTOMY Left 12/16/2020    Procedure: Left THYROID LOBECTOMY;  Surgeon: Jhony Bhatt MD;  Location: [facility];  Service: ENT    RECTAL POLYPECTOMY      REPLACEMENT TOTAL KNEE Left     last assessed - 27Apr2015    TONSILLECTOMY      TOTAL ABDOMINAL HYSTERECTOMY      TUBAL LIGATION      US GUIDED THYROID BIOPSY  10/14/2020       Family History   Problem Relation Age of Onset    Heart disease Mother     Diabetes Mother     Heart disease Father     Coronary artery disease Father     Stroke Father         cerebrovascular accident    Heart attack Father         myocardial infarction    Sudden death Father         scd    Other Family         Back disorder    Coronary artery disease Family     Neuropathy Family     Osteoporosis Family     No Known Problems Daughter     No Known Problems Maternal Grandmother     No Known Problems Maternal Grandfather     No Known Problems Paternal Grandmother     No Known Problems Paternal Grandfather     Cancer Maternal Uncle     Breast cancer Maternal Aunt 65    No Known Problems Son     No Known Problems Maternal Aunt     No Known Problems Maternal Aunt     No Known Problems Maternal Aunt     No Known Problems Paternal Aunt     No Known Problems Paternal Aunt     Anuerysm Neg Hx     Clotting disorder Neg Hx     Arrhythmia Neg Hx     Heart failure Neg Hx      I have reviewed and agree with the  history as documented.    E-Cigarette/Vaping    E-Cigarette Use Never User      E-Cigarette/Vaping Substances    Nicotine No     THC No     CBD No     Flavoring No     Other No     Unknown No      Social History     Tobacco Use    Smoking status: Never    Smokeless tobacco: Never   Vaping Use    Vaping status: Never Used   Substance Use Topics    Alcohol use: Not Currently    Drug use: Never        Review of Systems   Constitutional:  Positive for fatigue. Negative for chills and fever.   Eyes:  Negative for pain and visual disturbance.   Respiratory:  Negative for cough and shortness of breath.    Cardiovascular:  Positive for palpitations. Negative for chest pain.   Gastrointestinal:  Negative for abdominal pain, nausea and vomiting.   Genitourinary:  Negative for dysuria and hematuria.   Neurological:  Positive for light-headedness. Negative for syncope.   All other systems reviewed and are negative.      Physical Exam  ED Triage Vitals   Temperature Pulse Respirations Blood Pressure SpO2   05/20/24 1158 05/20/24 1158 05/20/24 1158 05/20/24 1158 05/20/24 1158   97.6 °F (36.4 °C) (!) 115 18 141/70 99 %      Temp src Heart Rate Source Patient Position - Orthostatic VS BP Location FiO2 (%)   -- 05/20/24 1300 05/20/24 1415 05/20/24 1700 --    Monitor Sitting Right arm       Pain Score       05/20/24 2017       No Pain             Orthostatic Vital Signs  Vitals:    05/22/24 0745 05/22/24 1146 05/22/24 1314 05/22/24 1316   BP: 136/81 124/76 109/54 109/54   Pulse: 60 59  65   Patient Position - Orthostatic VS:           Physical Exam  Vitals and nursing note reviewed.   Constitutional:       General: She is not in acute distress.     Appearance: She is well-developed.   HENT:      Head: Normocephalic and atraumatic.   Eyes:      Conjunctiva/sclera: Conjunctivae normal.   Cardiovascular:      Rate and Rhythm: Tachycardia present. Rhythm irregular.      Heart sounds: No murmur heard.  Pulmonary:      Effort: Pulmonary  effort is normal. No respiratory distress.      Breath sounds: Normal breath sounds.   Abdominal:      Palpations: Abdomen is soft.      Tenderness: There is no abdominal tenderness.   Skin:     General: Skin is warm and dry.      Capillary Refill: Capillary refill takes less than 2 seconds.   Neurological:      General: No focal deficit present.      Mental Status: She is alert.         ED Medications  Medications   diltiazem (CARDIZEM) injection 15 mg (15 mg Intravenous Given 5/20/24 1306)   multi-electrolyte (ISOLYTE-S PH 7.4) bolus 1,000 mL (0 mL Intravenous Stopped 5/20/24 1546)   flecainide (TAMBOCOR) tablet 100 mg (100 mg Oral Given 5/21/24 1315)       Diagnostic Studies  Results Reviewed       Procedure Component Value Units Date/Time    Basic metabolic panel [[id number]]  (Abnormal) Collected: 05/21/24 0455    Lab Status: Final result Specimen: Blood from Arm, Right Updated: 05/21/24 0532     Sodium 128 mmol/L      Potassium 4.2 mmol/L      Chloride 96 mmol/L      CO2 25 mmol/L      ANION GAP 7 mmol/L      BUN 21 mg/dL      Creatinine 0.95 mg/dL      Glucose 126 mg/dL      Glucose, Fasting 126 mg/dL      Calcium 8.6 mg/dL      eGFR 57 ml/min/1.73sq m     Narrative:      National Kidney Disease Foundation guidelines for Chronic Kidney Disease (CKD):     Stage 1 with normal or high GFR (GFR > 90 mL/min/1.73 square meters)    Stage 2 Mild CKD (GFR = 60-89 mL/min/1.73 square meters)    Stage 3A Moderate CKD (GFR = 45-59 mL/min/1.73 square meters)    Stage 3B Moderate CKD (GFR = 30-44 mL/min/1.73 square meters)    Stage 4 Severe CKD (GFR = 15-29 mL/min/1.73 square meters)    Stage 5 End Stage CKD (GFR <15 mL/min/1.73 square meters)  Note: GFR calculation is accurate only with a steady state creatinine    CBC (With Platelets) [[id number]]  (Normal) Collected: 05/21/24 0455    Lab Status: Final result Specimen: Blood from Arm, Right Updated: 05/21/24 0517     WBC 6.68 Thousand/uL      RBC 4.02 Million/uL       Hemoglobin 12.3 g/dL      Hematocrit 36.9 %      MCV 92 fL      MCH 30.6 pg      MCHC 33.3 g/dL      RDW 14.6 %      Platelets 227 Thousands/uL      MPV 10.0 fL     HS Troponin I 4hr [[id number]]  (Normal) Collected: 05/20/24 1648    Lab Status: Final result Specimen: Blood from Arm, Left Updated: 05/20/24 1719     hs TnI 4hr 5 ng/L      Delta 4hr hsTnI 0 ng/L     TSH, 3rd generation with Free T4 reflex [[id number]]  (Normal) Collected: 05/20/24 1237    Lab Status: Final result Specimen: Blood from Arm, Left Updated: 05/20/24 1631     TSH 3RD GENERATON 2.108 uIU/mL     HS Troponin I 2hr [[id number]]  (Normal) Collected: 05/20/24 1439    Lab Status: Final result Specimen: Blood from Arm, Left Updated: 05/20/24 1514     hs TnI 2hr 5 ng/L      Delta 2hr hsTnI 0 ng/L     HS Troponin 0hr (reflex protocol) [[id number]]  (Normal) Collected: 05/20/24 1237    Lab Status: Final result Specimen: Blood from Arm, Left Updated: 05/20/24 1335     hs TnI 0hr 5 ng/L     Comprehensive metabolic panel [[id number]]  (Abnormal) Collected: 05/20/24 1237    Lab Status: Final result Specimen: Blood from Arm, Left Updated: 05/20/24 1331     Sodium 125 mmol/L      Potassium 4.1 mmol/L      Chloride 91 mmol/L      CO2 25 mmol/L      ANION GAP 9 mmol/L      BUN 24 mg/dL      Creatinine 1.05 mg/dL      Glucose 211 mg/dL      Calcium 9.1 mg/dL      AST 23 U/L      ALT 36 U/L      Alkaline Phosphatase 80 U/L      Total Protein 7.0 g/dL      Albumin 4.0 g/dL      Total Bilirubin 0.89 mg/dL      eGFR 50 ml/min/1.73sq m     Narrative:      National Kidney Disease Foundation guidelines for Chronic Kidney Disease (CKD):     Stage 1 with normal or high GFR (GFR > 90 mL/min/1.73 square meters)    Stage 2 Mild CKD (GFR = 60-89 mL/min/1.73 square meters)    Stage 3A Moderate CKD (GFR = 45-59 mL/min/1.73 square meters)    Stage 3B Moderate CKD (GFR = 30-44 mL/min/1.73 square meters)    Stage 4 Severe CKD (GFR = 15-29 mL/min/1.73 square meters)    Stage 5 End  Stage CKD (GFR <15 mL/min/1.73 square meters)  Note: GFR calculation is accurate only with a steady state creatinine    CBC and differential [[id number]] Collected: 05/20/24 1237    Lab Status: Final result Specimen: Blood from Arm, Left Updated: 05/20/24 1312     WBC 8.18 Thousand/uL      RBC 4.02 Million/uL      Hemoglobin 12.4 g/dL      Hematocrit 36.6 %      MCV 91 fL      MCH 30.8 pg      MCHC 33.9 g/dL      RDW 14.4 %      MPV 10.4 fL      Platelets 227 Thousands/uL      nRBC 0 /100 WBCs      Segmented % 69 %      Immature Grans % 1 %      Lymphocytes % 17 %      Monocytes % 10 %      Eosinophils Relative 2 %      Basophils Relative 1 %      Absolute Neutrophils 5.78 Thousands/µL      Absolute Immature Grans 0.05 Thousand/uL      Absolute Lymphocytes 1.39 Thousands/µL      Absolute Monocytes 0.78 Thousand/µL      Eosinophils Absolute 0.12 Thousand/µL      Basophils Absolute 0.06 Thousands/µL                    XR chest 1 view portable   Final Result by Naveen Carrillo MD (05/22 1221)      Prominent interstitial markings with probable small pleural effusions consistent with mild pulmonary edema.      Workstation performed: [id number]               Procedures  ECG 12 Lead Documentation Only    Date/Time: 5/20/2024 12:57 PM    Performed by: Khushi Gresham MD  Authorized by: Khushi Gresham MD    Rate:     ECG rate:  118    ECG rate assessment: tachycardic    Rhythm:     Rhythm: atrial fibrillation and other rhythm    QRS:     QRS axis:  Left    QRS intervals:  Wide  Comments:      Ventricular paced rhythm with underlying atrial fibrillation with RVR.        ED Course                                       Medical Decision Making  78-year-old female presents for evaluation of palpitations.    On initial evaluation, patient resting comfortably in bed, in no acute distress.  EKG shows atrial fibrillation with ventricular paced rhythm.  HR in the 110s-130s.  Physical exam grossly unremarkable.  Labs  significant for sodium 125.  IV fluids given.  Patient started on Cardizem, initial bolus 50 mg and then a drip.  Patient already anticoagulated on Eliquis, so no need for new anticoagulation at this time.  Patient was admitted to hospitalist service for rate control and further management.    Amount and/or Complexity of Data Reviewed  Labs: ordered.  Radiology: ordered.    Risk  Prescription drug management.  Decision regarding hospitalization.          Disposition  Final diagnoses:   Atrial fibrillation with rapid ventricular response (HCC)   Hyponatremia     Time reflects when diagnosis was documented in both MDM as applicable and the Disposition within this note       Time User Action Codes Description Comment    5/20/2024  1:36 PM Khushi Gresham Add [I48.91] Atrial fibrillation with rapid ventricular response (HCC)     5/20/2024  1:36 PM Khushi Gresham Add [E87.1] Hyponatremia     5/22/2024  2:55 PM Sean Foster Add [Z95.0] Pacemaker           ED Disposition       ED Disposition   Admit    Condition   Stable    Date/Time   Mon May 20, 2024  1:59 PM    Comment   Case was discussed with Dr. Silva and the patient's admission status was agreed to be Admission Status: inpatient status to the service of Dr. Silva .               Follow-up Information       Follow up With Specialties Details Why Contact Info    Naveen Monsivais MD Family Medicine Schedule an appointment as soon as possible for a visit in 1 week(s)  [address] [facility] [address] [phone]      Nir Cunningham DO Cardiology, Multidisciplinary Call in 2 day(s)  [address] [place] [id number] [phone]              Discharge Medication List as of 5/22/2024  3:15 PM        CONTINUE these medications which have CHANGED    Details   flecainide (TAMBOCOR) 150 MG tablet Take 1 tablet (150 mg total) by mouth 2 (two) times a day, Starting Wed 5/22/2024, Until Fri 6/21/2024, Normal           CONTINUE these  medications which have NOT CHANGED    Details   acetylcysteine (MUCOMYST) 200 mg/mL nebulizer solution Take 4 mL (800 mg total) by nebulization every 4 (four) hours, Starting Mon 3/25/2024, Normal      albuterol (ProAir HFA) 90 mcg/act inhaler Inhale 2 puffs every 6 (six) hours as needed for wheezing, Starting Tue 1/30/2024, Normal      apixaban (ELIQUIS) 5 mg Take 1 tablet (5 mg total) by mouth 2 (two) times a day, Starting Wed 1/17/2024, Normal      ascorbic acid (VITAMIN C) 500 mg tablet Take 500 mg by mouth daily , Historical Med      Cartia  MG 24 hr capsule take 1 capsule by mouth twice a day .YOU MAKE TAKE AN ADDITIONAL CAPSULE DAILY IF YOU HAVE PALPITATIONS AND ELEVATED HEART RATE CONSISTENT WITH YOUR A FIB. MAX 3 CAPS DAILY, Normal      Cetirizine HCl (ZyrTEC Allergy) 10 MG CAPS Take 10 mg by mouth in the morning, Historical Med      Cholecalciferol 50 MCG (2000 UT) CAPS Take 2,000 Units by mouth 2 (two) times a day, Historical Med      Chromium Picolinate (CHROMIUM PICOLATE PO) Take 1 tablet by mouth daily, Historical Med      ezetimibe (ZETIA) 10 mg tablet take 1 tablet by mouth once daily, Normal      furosemide (LASIX) 20 mg tablet Take 1 tablet (20 mg total) by mouth daily take 2 tablet by mouth daily if needed for shortness of breath WEIGHT GAIN 3 LBS IN 1 DAY OR LOWER LEG SWELLING, Starting Thu 1/4/2024, Normal      gabapentin (NEURONTIN) 300 mg capsule take 1 capsule by mouth at bedtime, Starting Sun 2/18/2024, Normal      ipratropium (ATROVENT) 0.03 % nasal spray 2 sprays into each nostril 3 (three) times a day Begin once per day, then progress to twice per day. Progress to three times a day as tolerated., Starting Thu 1/25/2024, Normal      metoprolol succinate (TOPROL-XL) 100 mg 24 hr tablet Take 1 tablet (100 mg total) by mouth every 12 (twelve) hours, Starting Mon 3/11/2024, Normal      rOPINIRole (REQUIP) 1 mg tablet Take 2 tablets (2 mg total) by mouth daily at bedtime, Starting Mon  5/6/2024, Normal      sodium chloride 3 % inhalation solution Take 4 mL by nebulization as needed for cough, Starting Mon 3/25/2024, Normal      TURMERIC PO Take 1 capsule by mouth 2 (two) times a day, Historical Med      zolpidem (AMBIEN) 10 mg tablet Take 1 tablet (10 mg total) by mouth daily at bedtime as needed for sleep, Starting Tue 1/23/2024, Normal           STOP taking these medications       losartan (COZAAR) 50 mg tablet Comments:   Reason for Stopping:             Outpatient Discharge Orders   Call provider for:  persistent dizziness or light-headedness     Call provider for:  extreme fatigue     Call provider for:  persistent nausea or vomiting       PDMP Review         Value Time User    PDMP Reviewed  Yes 1/23/2024  9:06 AM Josie Wynn MD             ED Provider  Attending physically available and evaluated Farnaz Martin. I managed the patient along with the ED Attending.    Electronically Signed by           Khushi Gresham MD  05/23/24 3897

## 2024-05-20 NOTE — ASSESSMENT & PLAN NOTE
Wt Readings from Last 3 Encounters:   05/13/24 71.2 kg (157 lb)   05/06/24 71.7 kg (158 lb)   04/16/24 68 kg (150 lb)   Patient with history of heart failure on diltiazem, metoprolol, Lasix, losartan.  Has had recent worsening of lower extremity swelling which may be reflective of current rapid ventricular rate.  On exam does not appear significantly overloaded, although states that she has had a little more lower extremity swelling.  X-ray read and pending with increased haziness and right-sided small pleural effusion.  7 pounds up from a weight 1 month prior.  Likely in mild exacerbation, may need some extra diuresis after stabilization of rate.    Plan:  Continue home medications

## 2024-05-20 NOTE — LETTER
Thank you for allowing us to participate in the care of your patient, Farnaz Martin, who was hospitalized from 5/20/2024 through 5/22/2024 with the admitting diagnosis of palpitation.  Initial EKG showed undetermined rhythm likely  A-fib with RVR versus atrial tachycardia with ventricular paced.  Cardiology was consulted and per their discussion with EP service-patient received the extra dose flecainide 100 mg on 5/21 with perfectly heart rate controlled.  SCD interrogation revealing atrial tachycardia. She will be discharged with flecainide 150 mg twice daily.  Her losartan is currently on hold since her blood pressure is controlled based on current regimen.  She is medically stable to be discharged and follow-up with her cardiologist at [facility].      If you have any additional questions or would like to discuss further, please feel free to contact me.    Sean Foster MD  [facility], Hospitalist  [phone]

## 2024-05-20 NOTE — ASSESSMENT & PLAN NOTE
Patient currently with softer blood pressures, on diltiazem, metoprolol, losartan, Lasix    Plan:  Will continue current regimen, consider discontinuing 1 agent if pressures begin to worsen

## 2024-05-20 NOTE — ED NOTES
BP noted to be 90/66 when RN went to start Cardizem gtt. Provider made aware.      Anuradha Naylor, COLLEEN  05/20/24 6618

## 2024-05-20 NOTE — ASSESSMENT & PLAN NOTE
Lab Results   Component Value Date    SODIUM 125 (L) 05/20/2024    SODIUM 134 (L) 05/07/2024    SODIUM 133 (L) 01/22/2024      Patient with history of chronic hyponatremia around 128-134, presenting with acute exacerbation of hyponatremia to 125    Plan:  Receiving 1 L of Isolyte, will recheck sodium on morning labs  If worsens will need evaluation of hyponatremia source

## 2024-05-20 NOTE — TELEPHONE ENCOUNTER
Pt's daughter Ynes called stating that she is taking pt to [place] ED d/t pt having hypotensive episodes and in and out of Afib. Ynes was calling to schedule an appointment. I advised Ynes to call after pt has been evaluated at ED. She understood.leigh ann BARONE

## 2024-05-20 NOTE — H&P
[facility]  H&P  Name: Farnaz Martin 78 y.o. female I MRN: [id number]  Unit/Bed#: ED-38 I Date of Admission: 5/20/2024   Date of Service: 5/20/2024 I Hospital Day: 0      Assessment & Plan   * Atrial fibrillation with rapid ventricular response (HCC)  Assessment & Plan  Patient presenting with 1 week history of intermittent palpitations with accompanying dyspnea on exertion.  Notes that has had low pressures and elevated heart rates to 120s with blood pressure checks at home.  No notable inciting factors as per patient.  Occasional lightheadedness and ambulatory dysfunction, stated has happened previously with hyponatremia but unsure if related to this event.  Has had some recent increase in lower extremity swelling.  Emergency room workup with normal troponins, CBC unremarkable, CMP with hyponatremia 125.  Noted to have heart rates from 115-120s.  EKG demonstrating ventricularly paced tachycardia. Satting well on room air.  Chest x-ray read independently, right-sided pleural effusion with some increased haziness lung fields.    Plan:  Continuing diltiazem drip, starting diltiazem 60 every 6 hours to transition to oral medications  Continuing with home metoprolol  On Eliquis for anticoagulation  Cardiology consult  TSH  Pacemaker interrogation    Hyponatremia  Assessment & Plan  Lab Results   Component Value Date    SODIUM 125 (L) 05/20/2024    SODIUM 134 (L) 05/07/2024    SODIUM 133 (L) 01/22/2024      Patient with history of chronic hyponatremia around 128-134, presenting with acute exacerbation of hyponatremia to 125    Plan:  Receiving 1 L of Isolyte, will recheck sodium on morning labs  If worsens will need evaluation of hyponatremia source    Chronic diastolic CHF (congestive heart failure) (HCC)  Assessment & Plan  Wt Readings from Last 3 Encounters:   05/13/24 71.2 kg (157 lb)   05/06/24 71.7 kg (158 lb)   04/16/24 68 kg (150 lb)   Patient with history of heart failure on  "diltiazem, metoprolol, Lasix, losartan.  Has had recent worsening of lower extremity swelling which may be reflective of current rapid ventricular rate.  On exam does not appear significantly overloaded, although states that she has had a little more lower extremity swelling.  X-ray read and pending with increased haziness and right-sided small pleural effusion.  7 pounds up from a weight 1 month prior.  Likely in mild exacerbation, may need some extra diuresis after stabilization of rate.    Plan:  Continue home medications            Hyperlipidemia  Assessment & Plan  Continue ezetimibe    Essential hypertension  Assessment & Plan  Patient currently with softer blood pressures, on diltiazem, metoprolol, losartan, Lasix    Plan:  Will continue current regimen, consider discontinuing 1 agent if pressures begin to worsen           VTE Pharmacologic Prophylaxis: VTE Score: 6 High Risk (Score >/= 5) - Pharmacological DVT Prophylaxis Ordered: apixaban (Eliquis). Sequential Compression Devices Ordered.  Code Status: Prior DNR/DNI, discussed with patient and daughter at bedside, patient has living room  Discussion with family: Updated  (daughter) at bedside.    Anticipated Length of Stay: Patient will be admitted on an observation basis with an anticipated length of stay of less than 2 midnights secondary to atrial fibrillation with RVR.    Chief Complaint: \"Feels like butterflies in my chest\"    History of Present Illness:  Farnaz Martin is a 78 y.o. female with a PMH of A-fib status post ablation in 2002 with history of RVR on Eliquis, CHF, hypothyroidism, GERD, nausea, hypertension, thyroid cancer, renal mass suspicious for cancer currently being worked up who presents with approximately 1 week history of intermittent palpitations.  Patient states that she has had episodes like this in the past found to be A-fib with RVR, she states that sometimes based on the amiodarone at this time it is continued " for the week and has become persistent today.  During this time she also has had fatigue and shortness of breath with activity during these episodes of palpitations.  States that while checking her blood pressure she noted that she has had lower readings than normal alongside tachycardia.  Patient has noted that she has also been slightly lightheaded and having some issues with ambulation which she states has happened in the past due to low sodium and is wondering if that is the issue currently.  Notes that she does have a chronic cough since being diagnosed with bronchiectasis back in February, for which she takes nebulizer and has albuterol inhaler.  No she has had some rhinorrhea but attributes this to allergies that she is not taking any allergy medications recently.  Otherwise denies any symptoms such as fever, chills, sore throat, nausea, vomiting, chest pains, abdominal pains, constipation, urinary symptoms, skin changes, confusion.    Patient's daughter noted that she had recently had some lower extremity swelling for which she had her dose of Lasix doubled approximately 2 weeks prior.  Had return to normal Lasix dosing around 1 week ago which is when her symptoms started.  Also notes that the patient has a renal mass which is currently being worked up in the outpatient setting for malignancy.    Emergency department patient was started on diltiazem drip for rate control.  EKG was notable for ventricularly paced rhythm with rate of 118, no ischemic changes noted.  CBC unremarkable, initial troponins negative, noted to have sodium 125 and chloride of 91 with glucose elevated to 211.  Was also given 1 L of Isolyte.  Review of Systems:  Review of Systems   Constitutional:  Negative for chills and fever.   HENT:  Positive for rhinorrhea (attributed to allergies). Negative for ear pain and sore throat.    Respiratory:  Positive for cough (chronic). Negative for shortness of breath.    Cardiovascular:  Positive  for palpitations and leg swelling. Negative for chest pain.   Gastrointestinal:  Negative for abdominal pain, constipation, diarrhea, nausea and vomiting.   Genitourinary:  Negative for dysuria and hematuria.   Musculoskeletal:  Negative for arthralgias and back pain.   Skin:  Negative for color change and rash.   Neurological:  Positive for light-headedness. Negative for dizziness and syncope.   Psychiatric/Behavioral:  Negative for confusion.    All other systems reviewed and are negative.      Past Medical and Surgical History:   Past Medical History:   Diagnosis Date    Actinic keratosis     last assessed - 06Jun2014    Arthritis     Atrial fibrillation with rapid ventricular response (HCC)     last assessed - 26Apr2016    Basal cell carcinoma     Benign colon polyp     last assessed - 27Apr2015    CHF (congestive heart failure) ([facility]) 06/2022    Disease of thyroid gland     Effusion of knee joint right     Resolved - [id number]    Esophageal reflux     Fibromyalgia     last assessed - [id number]    Fibromyalgia, primary     GERD (gastroesophageal reflux disease)     Hypertension     Palpitations     last assessed - 30Apr2013    Peroneal tendonitis, right     last assessed - [id number]    Right lumbar radiculopathy 03/17/2016    Thyroid cancer ([facility])     Thyroid nodule     Trochanteric bursitis of left hip 03/09/2018       Past Surgical History:   Procedure Laterality Date    CARDIAC ELECTROPHYSIOLOGY STUDY AND ABLATION  08/2022    CATARACT EXTRACTION Bilateral     COLONOSCOPY  03/2018    COLONOSCOPY W/ POLYPECTOMY  2021    repeat in 5 years    EYE SURGERY      HYSTERECTOMY      JOINT REPLACEMENT Left     knee    OOPHORECTOMY      HI NEUROPLASTY &/TRANSPOS MEDIAN NRV CARPAL TUNNE Right 11/14/2019    Procedure: RELEASE CARPAL TUNNEL;  Surgeon: Onesimo Tate MD;  Location: [facility];  Service: Orthopedics    HI TOTAL THYROID LOBECTOMY UNI W/WO ISTHMUSECTOMY Left 12/16/2020    Procedure: Left THYROID LOBECTOMY;   PAST SURGICAL HISTORY:  No significant past surgical history      Surgeon: Jhony Bhatt MD;  Location: [facility];  Service: ENT    RECTAL POLYPECTOMY      REPLACEMENT TOTAL KNEE Left     last assessed - 27Apr2015    TONSILLECTOMY      TOTAL ABDOMINAL HYSTERECTOMY      TUBAL LIGATION      US GUIDED THYROID BIOPSY  10/14/2020       Meds/Allergies:  Prior to Admission medications    Medication Sig Start Date End Date Taking? Authorizing Provider   acetylcysteine (MUCOMYST) 200 mg/mL nebulizer solution Take 4 mL (800 mg total) by nebulization every 4 (four) hours 3/25/24   Anabella Dougherty MD   albuterol (ProAir HFA) 90 mcg/act inhaler Inhale 2 puffs every 6 (six) hours as needed for wheezing 1/30/24   Anabella Dougherty MD   apixaban (ELIQUIS) 5 mg Take 1 tablet (5 mg total) by mouth 2 (two) times a day 1/17/24   Nir Cunningham DO   ascorbic acid (VITAMIN C) 500 mg tablet Take 500 mg by mouth daily     Historical Provider, MD   Cartia  MG 24 hr capsule take 1 capsule by mouth twice a day .YOU MAKE TAKE AN ADDITIONAL CAPSULE DAILY IF YOU HAVE PALPITATIONS AND ELEVATED HEART RATE CONSISTENT WITH YOUR A FIB. MAX 3 CAPS DAILY 2/28/24   Avelino Guerra MD   Cetirizine HCl (ZyrTEC Allergy) 10 MG CAPS Take 10 mg by mouth in the morning  Patient not taking: Reported on 5/13/2024    Historical Provider, MD   Cholecalciferol 50 MCG (2000 UT) CAPS Take 2,000 Units by mouth 2 (two) times a day    Historical Provider, MD   Chromium Picolinate (CHROMIUM PICOLATE PO) Take 1 tablet by mouth daily    Historical Provider, MD   DULoxetine (CYMBALTA) 20 mg capsule take 1 capsule by mouth once daily 4/1/24   Naveen Monsviais MD   ezetimibe (ZETIA) 10 mg tablet take 1 tablet by mouth once daily 7/10/23   Naveen Monsivais MD   flecainide (TAMBOCOR) 100 mg tablet Take 1 tablet (100 mg total) by mouth 2 (two) times a day 8/28/23   JENNY Hardy   furosemide (LASIX) 20 mg tablet Take 1 tablet (20 mg total) by mouth daily take 2 tablet by mouth daily if needed for shortness of breath  WEIGHT GAIN 3 LBS IN 1 DAY OR LOWER LEG SWELLING 1/4/24   Avelino Guerra MD   gabapentin (NEURONTIN) 300 mg capsule take 1 capsule by mouth at bedtime 2/18/24   Naveen Monsivais MD   ipratropium (ATROVENT) 0.03 % nasal spray 2 sprays into each nostril 3 (three) times a day Begin once per day, then progress to twice per day. Progress to three times a day as tolerated.  Patient not taking: Reported on 1/31/2024 1/25/24   Anabella Dougherty MD   losartan (COZAAR) 50 mg tablet take 1 tablet by mouth twice a day 1/15/24   Nir Cunningham DO   metoprolol succinate (TOPROL-XL) 100 mg 24 hr tablet Take 1 tablet (100 mg total) by mouth every 12 (twelve) hours 3/11/24   Nir Cunningham DO   rOPINIRole (REQUIP) 1 mg tablet Take 2 tablets (2 mg total) by mouth daily at bedtime 5/6/24   Apple Lobato MD   sodium chloride 3 % inhalation solution Take 4 mL by nebulization as needed for cough 3/25/24   JENNY Enriquez   TURMERIC PO Take 1 capsule by mouth 2 (two) times a day    Historical Provider, MD   zolpidem (AMBIEN) 10 mg tablet Take 1 tablet (10 mg total) by mouth daily at bedtime as needed for sleep 1/23/24   Josie Wynn MD     I have reviewed home medications with patient family member.    Allergies:   Allergies   Allergen Reactions    Sotalol Other (See Comments)     Prolonged QTc leading to torsades de pointes     Penicillins Other (See Comments)     As a child calcium deposit in the arm     Ace Inhibitors GI Intolerance     Did feel well on it    Omeprazole Abdominal Pain, Rash and Vomiting     stomach pain, vomiting, rash       Social History:  Marital Status:    Occupation: na  Patient Pre-hospital Living Situation: Home  Patient Pre-hospital Level of Mobility: walks  Patient Pre-hospital Diet Restrictions: none  Substance Use History:   Social History     Substance and Sexual Activity   Alcohol Use Not Currently     Social History     Tobacco Use   Smoking Status Never   Smokeless  Tobacco Never     Social History     Substance and Sexual Activity   Drug Use Never       Family History:  Family History   Problem Relation Age of Onset    Heart disease Mother     Diabetes Mother     Heart disease Father     Coronary artery disease Father     Stroke Father         cerebrovascular accident    Heart attack Father         myocardial infarction    Sudden death Father         scd    Other Family         Back disorder    Coronary artery disease Family     Neuropathy Family     Osteoporosis Family     No Known Problems Daughter     No Known Problems Maternal Grandmother     No Known Problems Maternal Grandfather     No Known Problems Paternal Grandmother     No Known Problems Paternal Grandfather     Cancer Maternal Uncle     Breast cancer Maternal Aunt 65    No Known Problems Son     No Known Problems Maternal Aunt     No Known Problems Maternal Aunt     No Known Problems Maternal Aunt     No Known Problems Paternal Aunt     No Known Problems Paternal Aunt     Anuerysm Neg Hx     Clotting disorder Neg Hx     Arrhythmia Neg Hx     Heart failure Neg Hx        Physical Exam:     Vitals:   Blood Pressure: 108/66 (05/20/24 1546)  Pulse: (!) 118 (05/20/24 1546)  Temperature: 97.6 °F (36.4 °C) (05/20/24 1158)  Respirations: 18 (05/20/24 1545)  SpO2: 96 % (05/20/24 1545)    Physical Exam  Vitals and nursing note reviewed.   Constitutional:       General: She is not in acute distress.     Appearance: She is well-developed. She is not ill-appearing or diaphoretic.   HENT:      Head: Normocephalic and atraumatic.   Eyes:      Conjunctiva/sclera: Conjunctivae normal.   Cardiovascular:      Rate and Rhythm: Tachycardia present. Rhythm irregular.      Heart sounds: No murmur heard.  Pulmonary:      Effort: Pulmonary effort is normal. No respiratory distress.      Breath sounds: No stridor. Rales (Faint left lower lobe) present. No wheezing or rhonchi.   Abdominal:      General: Bowel sounds are normal. There is no  distension.      Palpations: Abdomen is soft.      Tenderness: There is no abdominal tenderness.   Musculoskeletal:         General: Swelling (Lower extremity swelling minimal secondary to patient wearing tight socks, states they were more edematous than usual this morning) present.   Skin:     General: Skin is warm and dry.      Capillary Refill: Capillary refill takes less than 2 seconds.   Neurological:      Mental Status: She is alert and oriented to person, place, and time.   Psychiatric:         Mood and Affect: Mood normal.          Additional Data:     Lab Results:  Results from last 7 days   Lab Units 05/20/24  1237   WBC Thousand/uL 8.18   HEMOGLOBIN g/dL 12.4   HEMATOCRIT % 36.6   PLATELETS Thousands/uL 227   SEGS PCT % 69   LYMPHO PCT % 17   MONO PCT % 10   EOS PCT % 2     Results from last 7 days   Lab Units 05/20/24  1237   SODIUM mmol/L 125*   POTASSIUM mmol/L 4.1   CHLORIDE mmol/L 91*   CO2 mmol/L 25   BUN mg/dL 24   CREATININE mg/dL 1.05   ANION GAP mmol/L 9   CALCIUM mg/dL 9.1   ALBUMIN g/dL 4.0   TOTAL BILIRUBIN mg/dL 0.89   ALK PHOS U/L 80   ALT U/L 36   AST U/L 23   GLUCOSE RANDOM mg/dL 211*                       Lines/Drains:  Invasive Devices       Peripheral Intravenous Line  Duration             Peripheral IV 05/20/24 Distal;Left;Ventral (anterior) Forearm <1 day    Peripheral IV 05/20/24 Left Antecubital <1 day                        Imaging: Personally reviewed the following imaging: chest xray  XR chest 1 view portable    (Results Pending)       EKG and Other Studies Reviewed on Admission:   EKG: Paced rhythm. .    ** Please Note: This note has been constructed using a voice recognition system. **

## 2024-05-20 NOTE — ED ATTENDING ATTESTATION
5/20/2024  IPhilip DO, saw and evaluated the patient. I have discussed the patient with the resident/non-physician practitioner and agree with the resident's/non-physician practitioner's findings, Plan of Care, and MDM as documented in the resident's/non-physician practitioner's note, except where noted. All available labs and Radiology studies were reviewed.  I was present for key portions of any procedure(s) performed by the resident/non-physician practitioner and I was immediately available to provide assistance.       At this point I agree with the current assessment done in the Emergency Department.  I have conducted an independent evaluation of this patient a history and physical is as follows:                   78-year-old female presents with generalized weakness, fatigue with minimal activity and sensation of palpitations.  History of ventricular pacemaker, history of atrial fibrillation status post ablation, she is been feeling this way for about a week, she does take Eliquis, no falls no injury, some associated chest discomfort but no true pain      Atrial Fibrillation                     Physical Exam  Vitals reviewed.   Constitutional:       General: She is not in acute distress.     Appearance: She is well-developed. She is not diaphoretic.   HENT:      Head: Normocephalic and atraumatic.      Right Ear: External ear normal.      Left Ear: External ear normal.      Nose: Nose normal.   Eyes:      General:         Right eye: No discharge.         Left eye: No discharge.      Pupils: Pupils are equal, round, and reactive to light.   Neck:      Trachea: No tracheal deviation.   Cardiovascular:      Rate and Rhythm: Normal rate and regular rhythm.      Heart sounds: Normal heart sounds. No murmur heard.  Pulmonary:      Effort: Pulmonary effort is normal. No respiratory distress.      Breath sounds: Normal breath sounds. No stridor.   Abdominal:      General: There is no distension.       Palpations: Abdomen is soft.      Tenderness: There is no abdominal tenderness. There is no guarding or rebound.   Musculoskeletal:         General: Normal range of motion.      Cervical back: Normal range of motion and neck supple.   Skin:     General: Skin is warm and dry.      Coloration: Skin is not pale.      Findings: No erythema.   Neurological:      General: No focal deficit present.      Mental Status: She is alert and oriented to person, place, and time.             Labs Reviewed   COMPREHENSIVE METABOLIC PANEL - Abnormal       Result Value Ref Range Status    Sodium 125 (*) 135 - 147 mmol/L Final    Potassium 4.1  3.5 - 5.3 mmol/L Final    Chloride 91 (*) 96 - 108 mmol/L Final    CO2 25  21 - 32 mmol/L Final    ANION GAP 9  4 - 13 mmol/L Final    BUN 24  5 - 25 mg/dL Final    Creatinine 1.05  0.60 - 1.30 mg/dL Final    Comment: Standardized to IDMS reference method    Glucose 211 (*) 65 - 140 mg/dL Final    Comment: If the patient is fasting, the ADA then defines impaired fasting glucose as > 100 mg/dL and diabetes as > or equal to 123 mg/dL.    Calcium 9.1  8.4 - 10.2 mg/dL Final    AST 23  13 - 39 U/L Final    ALT 36  7 - 52 U/L Final    Comment: Specimen collection should occur prior to Sulfasalazine administration due to the potential for falsely depressed results.     Alkaline Phosphatase 80  34 - 104 U/L Final    Total Protein 7.0  6.4 - 8.4 g/dL Final    Albumin 4.0  3.5 - 5.0 g/dL Final    Total Bilirubin 0.89  0.20 - 1.00 mg/dL Final    Comment: Use of this assay is not recommended for patients undergoing treatment with eltrombopag due to the potential for falsely elevated results.  N-acetyl-p-benzoquinone imine (metabolite of Acetaminophen) will generate erroneously low results in samples for patients that have taken an overdose of Acetaminophen.    eGFR 50  ml/min/1.73sq m Final    Narrative:     [employer] guidelines for Chronic Kidney Disease (CKD):     Stage 1 with  "normal or high GFR (GFR > 90 mL/min/1.73 square meters)    Stage 2 Mild CKD (GFR = 60-89 mL/min/1.73 square meters)    Stage 3A Moderate CKD (GFR = 45-59 mL/min/1.73 square meters)    Stage 3B Moderate CKD (GFR = 30-44 mL/min/1.73 square meters)    Stage 4 Severe CKD (GFR = 15-29 mL/min/1.73 square meters)    Stage 5 End Stage CKD (GFR <15 mL/min/1.73 square meters)  Note: GFR calculation is accurate only with a steady state creatinine   HS TROPONIN I 0HR - Normal    hs TnI 0hr 5  \"Refer to ACS Flowchart\"- see link ng/L Final    Comment:                                              Initial (time 0) result  If >=50 ng/L, Myocardial injury suggested ;  Type of myocardial injury and treatment strategy  to be determined.  If 5-49 ng/L, a delta result at 2 hours and or 4 hours will be needed to further evaluate.  If <4 ng/L, and chest pain has been >3 hours since onset, patient may qualify for discharge based on the HEART score in the ED.  If <5 ng/L and <3hours since onset of chest pain, a delta result at 2 hours will be needed to further evaluate.    HS Troponin 99th Percentile URL of a Health Population=12 ng/L with a 95% Confidence Interval of 8-18 ng/L.    Second Troponin (time 2 hours)  If calculated delta >= 20 ng/L,  Myocardial injury suggested ; Type of myocardial injury and treatment strategy to be determined.  If 5-49 ng/L and the calculated delta is 5-19 ng/L, consult medical service for evaluation.  Continue evaluation for ischemia on ecg and other possible etiology and repeat hs troponin at 4 hours.  If delta is <5 ng/L at 2 hours, consider discharge based on risk stratification via the HEART score (if in ED), or KYLIE risk score in IP/Observation.    HS Troponin 99th Percentile URL of a Health Population=12 ng/L with a 95% Confidence Interval of 8-18 ng/L.   CBC AND DIFFERENTIAL    WBC 8.18  4.31 - 10.16 Thousand/uL Final    RBC 4.02  3.81 - 5.12 Million/uL Final    Hemoglobin 12.4  11.5 - 15.4 g/dL Final "    Hematocrit 36.6  34.8 - 46.1 % Final    MCV 91  82 - 98 fL Final    MCH 30.8  26.8 - 34.3 pg Final    MCHC 33.9  31.4 - 37.4 g/dL Final    RDW 14.4  11.6 - 15.1 % Final    MPV 10.4  8.9 - 12.7 fL Final    Platelets 227  149 - 390 Thousands/uL Final    nRBC 0  /100 WBCs Final    Segmented % 69  43 - 75 % Final    Immature Grans % 1  0 - 2 % Final    Lymphocytes % 17  14 - 44 % Final    Monocytes % 10  4 - 12 % Final    Eosinophils Relative 2  0 - 6 % Final    Basophils Relative 1  0 - 1 % Final    Absolute Neutrophils 5.78  1.85 - 7.62 Thousands/µL Final    Absolute Immature Grans 0.05  0.00 - 0.20 Thousand/uL Final    Absolute Lymphocytes 1.39  0.60 - 4.47 Thousands/µL Final    Absolute Monocytes 0.78  0.17 - 1.22 Thousand/µL Final    Eosinophils Absolute 0.12  0.00 - 0.61 Thousand/µL Final    Basophils Absolute 0.06  0.00 - 0.10 Thousands/µL Final   HS TROPONIN I 2HR         XR chest 1 view portable    (Results Pending)                ECG 12 Lead Documentation Only    Date/Time: 5/20/2024 12:52 PM    Performed by: Philip Singletary DO  Authorized by: Philip Singletary DO    ECG reviewed by me, the ED Provider: yes    Patient location:  ED  Interpretation:     Interpretation: abnormal    Rate:     ECG rate:  118    ECG rate assessment: tachycardic    Rhythm:     Rhythm: atrial fibrillation and paced    Pacing:     Capture:  Complete    Type of pacing:  Ventricular  Ectopy:     Ectopy: none    QRS:     QRS axis:  Left    QRS intervals:  Wide  Conduction:     Conduction: abnormal      Abnormal conduction: complete LBBB    ST segments:     ST segments:  Non-specific  T waves:     T waves: non-specific    CriticalCare Time    Date/Time: 5/20/2024 2:05 PM    Performed by: Philip Singletary DO  Authorized by: Philip Singletary DO    Critical care provider statement:     Critical care time (minutes):  35    Critical care time was exclusive of:  Separately billable procedures and treating other patients and teaching  time    Critical care was necessary to treat or prevent imminent or life-threatening deterioration of the following conditions: A-fib with RVR requiring Cardizem infusion.    Critical care was time spent personally by me on the following activities:  Obtaining history from patient or surrogate, development of treatment plan with patient or surrogate, discussions with consultants, evaluation of patient's response to treatment, examination of patient, ordering and performing treatments and interventions, ordering and review of laboratory studies, ordering and review of radiographic studies, re-evaluation of patient's condition and review of old charts                              MDM  Number of Diagnoses or Management Options  Atrial fibrillation with rapid ventricular response (HCC)  Hyponatremia  Diagnosis management comments:       Initial ED assessment:   78-year-old female presents with fatigue chest pain shortness of breath with minimal activity/exertion.  Atrial fibrillation on EKG, rapid with a ventricular response of 1 15-1 25 at my time of examination    Initial DDx includes but is not limited to:   Rapid A-fib, possibly triggered by electrolyte abnormality, thyroid pathology, possibly triggering ischemia, NSTEMI, congestive heart failure    Initial ED plan:   Blood work chest x-ray, IV Cardizem for rate control, consider admission        Final ED summary/disposition:   After evaluation and workup in the emergency department, A-fib therapies are admitted to internal medicine service on Cardizem drip, did have an episode of hypotension treated with IV fluids               Time reflects when diagnosis was documented in both MDM as applicable and the Disposition within this note       Time User Action Codes Description Comment    5/20/2024  1:36 PM Khushi Gresham Add [I48.91] Atrial fibrillation with rapid ventricular response (HCC)     5/20/2024  1:36 PM Khushi Gresham Add [E87.1] Hyponatremia            ED Disposition       ED Disposition   Admit    Condition   Stable    Date/Time   Mon May 20, 2024  1:59 PM    Comment   Case was discussed with Dr. Silva and the patient's admission status was agreed to be Admission Status: inpatient status to the service of Dr. Silva .               Follow-up Information    None

## 2024-05-21 LAB
ANION GAP SERPL CALCULATED.3IONS-SCNC: 7 MMOL/L (ref 4–13)
BUN SERPL-MCNC: 21 MG/DL (ref 5–25)
CALCIUM SERPL-MCNC: 8.6 MG/DL (ref 8.4–10.2)
CHLORIDE SERPL-SCNC: 96 MMOL/L (ref 96–108)
CO2 SERPL-SCNC: 25 MMOL/L (ref 21–32)
CREAT SERPL-MCNC: 0.95 MG/DL (ref 0.6–1.3)
ERYTHROCYTE [DISTWIDTH] IN BLOOD BY AUTOMATED COUNT: 14.6 % (ref 11.6–15.1)
GFR SERPL CREATININE-BSD FRML MDRD: 57 ML/MIN/1.73SQ M
GLUCOSE P FAST SERPL-MCNC: 126 MG/DL (ref 65–99)
GLUCOSE SERPL-MCNC: 126 MG/DL (ref 65–140)
HCT VFR BLD AUTO: 36.9 % (ref 34.8–46.1)
HGB BLD-MCNC: 12.3 G/DL (ref 11.5–15.4)
MAGNESIUM SERPL-MCNC: 1.9 MG/DL (ref 1.9–2.7)
MCH RBC QN AUTO: 30.6 PG (ref 26.8–34.3)
MCHC RBC AUTO-ENTMCNC: 33.3 G/DL (ref 31.4–37.4)
MCV RBC AUTO: 92 FL (ref 82–98)
PLATELET # BLD AUTO: 227 THOUSANDS/UL (ref 149–390)
PMV BLD AUTO: 10 FL (ref 8.9–12.7)
POTASSIUM SERPL-SCNC: 4.2 MMOL/L (ref 3.5–5.3)
RBC # BLD AUTO: 4.02 MILLION/UL (ref 3.81–5.12)
SODIUM SERPL-SCNC: 128 MMOL/L (ref 135–147)
WBC # BLD AUTO: 6.68 THOUSAND/UL (ref 4.31–10.16)

## 2024-05-21 PROCEDURE — 80048 BASIC METABOLIC PNL TOTAL CA: CPT

## 2024-05-21 PROCEDURE — 99232 SBSQ HOSP IP/OBS MODERATE 35: CPT | Performed by: INTERNAL MEDICINE

## 2024-05-21 PROCEDURE — 83735 ASSAY OF MAGNESIUM: CPT

## 2024-05-21 PROCEDURE — 94640 AIRWAY INHALATION TREATMENT: CPT

## 2024-05-21 PROCEDURE — 99223 1ST HOSP IP/OBS HIGH 75: CPT | Performed by: INTERNAL MEDICINE

## 2024-05-21 PROCEDURE — 85027 COMPLETE CBC AUTOMATED: CPT

## 2024-05-21 PROCEDURE — 94760 N-INVAS EAR/PLS OXIMETRY 1: CPT

## 2024-05-21 RX ORDER — DILTIAZEM HYDROCHLORIDE 5 MG/ML
15 INJECTION INTRAVENOUS ONCE
Status: CANCELLED | OUTPATIENT
Start: 2024-05-21 | End: 2024-05-21

## 2024-05-21 RX ORDER — FLECAINIDE ACETATE 50 MG/1
100 TABLET ORAL ONCE
Status: COMPLETED | OUTPATIENT
Start: 2024-05-21 | End: 2024-05-21

## 2024-05-21 RX ORDER — DILTIAZEM HYDROCHLORIDE 60 MG/1
120 CAPSULE, EXTENDED RELEASE ORAL EVERY 12 HOURS
Status: DISCONTINUED | OUTPATIENT
Start: 2024-05-21 | End: 2024-05-22 | Stop reason: HOSPADM

## 2024-05-21 RX ORDER — LEVALBUTEROL INHALATION SOLUTION 1.25 MG/3ML
1.25 SOLUTION RESPIRATORY (INHALATION)
Status: DISCONTINUED | OUTPATIENT
Start: 2024-05-21 | End: 2024-05-22 | Stop reason: HOSPADM

## 2024-05-21 RX ORDER — ACETYLCYSTEINE 200 MG/ML
4 SOLUTION ORAL; RESPIRATORY (INHALATION)
Status: DISCONTINUED | OUTPATIENT
Start: 2024-05-22 | End: 2024-05-22 | Stop reason: HOSPADM

## 2024-05-21 RX ORDER — FLECAINIDE ACETATE 50 MG/1
150 TABLET ORAL 2 TIMES DAILY
Status: DISCONTINUED | OUTPATIENT
Start: 2024-05-21 | End: 2024-05-22 | Stop reason: HOSPADM

## 2024-05-21 RX ADMIN — EZETIMIBE 10 MG: 10 TABLET ORAL at 08:24

## 2024-05-21 RX ADMIN — LOSARTAN POTASSIUM 50 MG: 50 TABLET, FILM COATED ORAL at 08:24

## 2024-05-21 RX ADMIN — DILTIAZEM HYDROCHLORIDE 120 MG: 60 CAPSULE, EXTENDED RELEASE ORAL at 12:06

## 2024-05-21 RX ADMIN — METOPROLOL SUCCINATE 100 MG: 100 TABLET, EXTENDED RELEASE ORAL at 17:20

## 2024-05-21 RX ADMIN — LEVALBUTEROL HYDROCHLORIDE 1.25 MG: 1.25 SOLUTION RESPIRATORY (INHALATION) at 08:47

## 2024-05-21 RX ADMIN — ZOLPIDEM TARTRATE 10 MG: 5 TABLET ORAL at 22:21

## 2024-05-21 RX ADMIN — FLECAINIDE ACETATE 100 MG: 50 TABLET ORAL at 08:24

## 2024-05-21 RX ADMIN — ACETYLCYSTEINE 800 MG: 200 INHALANT RESPIRATORY (INHALATION) at 08:47

## 2024-05-21 RX ADMIN — FLECAINIDE ACETATE 150 MG: 50 TABLET ORAL at 21:18

## 2024-05-21 RX ADMIN — GABAPENTIN 300 MG: 300 CAPSULE ORAL at 21:17

## 2024-05-21 RX ADMIN — APIXABAN 5 MG: 5 TABLET, FILM COATED ORAL at 17:21

## 2024-05-21 RX ADMIN — METOPROLOL SUCCINATE 100 MG: 100 TABLET, EXTENDED RELEASE ORAL at 08:24

## 2024-05-21 RX ADMIN — APIXABAN 5 MG: 5 TABLET, FILM COATED ORAL at 08:24

## 2024-05-21 RX ADMIN — ROPINIROLE HYDROCHLORIDE 2 MG: 1 TABLET, FILM COATED ORAL at 21:17

## 2024-05-21 RX ADMIN — FLECAINIDE ACETATE 100 MG: 50 TABLET ORAL at 13:15

## 2024-05-21 RX ADMIN — FUROSEMIDE 20 MG: 20 TABLET ORAL at 08:24

## 2024-05-21 NOTE — ASSESSMENT & PLAN NOTE
Lab Results   Component Value Date    SODIUM 128 (L) 05/21/2024    SODIUM 125 (L) 05/20/2024    SODIUM 134 (L) 05/07/2024      Patient with history of chronic hyponatremia around 128-134, presenting with acute exacerbation of hyponatremia to 125  S/p 1L isolyte    Plan:  Currently baseline  Monitor

## 2024-05-21 NOTE — CONSULTS
Consultation - Cardiology Team 1  Farnaz Martin 78 y.o. female MRN: [id number]  Unit/Bed#: S -01 Encounter: [id number]    Assessment & Plan     Principal Problem:    Atrial fibrillation with rapid ventricular response (HCC)  Active Problems:    Essential hypertension    Hyperlipidemia    Chronic diastolic CHF (congestive heart failure) (HCC)    Hyponatremia      Assessment    Atrial arrhythmia with RVR  Presented V paced, difficult to determine underlying atrial rhythm.   Pt with atrial fibrillation ablation x2, Atrial flutter ablation. History of atrial tachycardia  Presented with palpitations and exertional fatigue  PTA-Tambocor 100 mg twice daily for rhythm management.  Additionally takes Toprol 100 mg twice a day as well as diltiazem 120 mg twice a day  History of QTc prolongation and torsades with sotalol  Given 15 mg IV Cardizem followed by drip, since discontinued  Currently  on Cardizem 60 mg every 6 hours ( last 2 doses held d/t BP parameters)  , Toprol 100 mg twice daily, Tambocor 100 mg twice daily  AC-on apixaban 5 mg twice daily ( may have missed 1 week few weeks ago inadvertently)   Remain in what appears to be atrial fibrillation  PPM interrogation pending    Chronic diastolic heart failure  Takes Lasix 20 mg daily  She does not appear volume overloaded    Essential hypertension- BP's controlled  Losartan 50 mg twice daily/Toprol 100 mg every 12 hours/Cardizem 60 mg every 6 hours    Hyperlipidemia- statin    5.   Hyponatremia-given 1 L isolate. Na 128 ( runs 129-133)    6.   Moderate MR- preserved LVEF. TTE 12/2023    7.   Moderate pulmonary HTN    Plan  Will f/u with PPM interrogation and reach out to EP for further rhythm management.   To allow for AV jeremiah blockers can hold losartan .     History of Present Illness   Physician Requesting Consult: Jamia Posey MD  Reason for Consult / Principal Problem: Atrial fibrillation with RVR    HPI: Farnaz Martin is a 78 y.o. year old female  with meningioma (follows at [facility] ), MGUS, type 2 diabetes, thyroid nodule status post lobectomy , chronic hyponatremia, paroxysmal atrial fibrillation/flutter status post cryoablation with PVI 3/2021 with symptomatic bradycardia postprocedure and underwent dual-chamber PPM (initiated on flecainide at that time), subsequently 8/2022 at [facility] underwent repeat PVI as well as posterior wall isolation and CTI flutter ablation (was on amiodarone short-term ), followed by cardioversion 8/2023 for recurrent A-fib who presents with weakness and fatigue with minimal activity associated with palpitation over past few days.   IV Cardizem was started for atrial fibrillation with RVR.  Noted to have hyponatremia.  Given 1 L of Isolyte.  In outpatient setting she is  undergoing workup for a renal mass. She is followed by Dr. Cunningham.  Locally she is followed by  by Dr. Omalley  for EP as well as EP at [facility].    She has noted LE edema since knee surgery past July treated with additional diuretic short term.     0-hour troponin 5  2-hour troponin 5  4-hour troponin 5  Sodium 125  BUN/creatinine-24/1.05  CBC normal  TSH normal    TTE 12/2023- LVEF 55%. Diastolic function normal. Mild LAE. Mod MR. Mod TR. RVSP 53mmHg.     It is noted she had prolonged QT leading to torsades on sotalol.  Patient was hospitalized 1/2024 for atrial tachycardia after COVID booster    PPM interrogation 3/2024-5 AT/ a flutter episodes max duration 14 minutes.      Inpatient consult to Cardiology  Consult performed by: JENNY Schmidt  Consult ordered by: Diomedes Reyna MD          Review of Systems    Historical Information   Past Medical History:   Diagnosis Date    Actinic keratosis     last assessed - 06Jun2014    Arthritis     Atrial fibrillation with rapid ventricular response (HCC)     last assessed - 26Apr2016    Basal cell carcinoma     Benign colon polyp     last assessed - 27Apr2015    CHF (congestive heart failure) (HCC) 06/2022     Disease of thyroid gland     Effusion of knee joint right     Resolved - [id number]    Esophageal reflux     Fibromyalgia     last assessed - [id number]    Fibromyalgia, primary     GERD (gastroesophageal reflux disease)     Hypertension     Palpitations     last assessed - [id number]    Peroneal tendonitis, right     last assessed - [id number]    Right lumbar radiculopathy 03/17/2016    Thyroid cancer (HCC)     Thyroid nodule     Trochanteric bursitis of left hip 03/09/2018     Past Surgical History:   Procedure Laterality Date    CARDIAC ELECTROPHYSIOLOGY STUDY AND ABLATION  08/2022    CATARACT EXTRACTION Bilateral     COLONOSCOPY  03/2018    COLONOSCOPY W/ POLYPECTOMY  2021    repeat in 5 years    EYE SURGERY      HYSTERECTOMY      JOINT REPLACEMENT Left     knee    OOPHORECTOMY      MN NEUROPLASTY &/TRANSPOS MEDIAN NRV CARPAL TUNNE Right 11/14/2019    Procedure: RELEASE CARPAL TUNNEL;  Surgeon: Onesimo Tate MD;  Location: [facility];  Service: Orthopedics    MN TOTAL THYROID LOBECTOMY UNI W/WO ISTHMUSECTOMY Left 12/16/2020    Procedure: Left THYROID LOBECTOMY;  Surgeon: Jhony Bhatt MD;  Location: [facility];  Service: ENT    RECTAL POLYPECTOMY      REPLACEMENT TOTAL KNEE Left     last assessed - 27Apr2015    TONSILLECTOMY      TOTAL ABDOMINAL HYSTERECTOMY      TUBAL LIGATION      US GUIDED THYROID BIOPSY  10/14/2020     Social History     Substance and Sexual Activity   Alcohol Use Not Currently     Social History     Substance and Sexual Activity   Drug Use Never     Social History     Tobacco Use   Smoking Status Never   Smokeless Tobacco Never     Family History:   Family History   Problem Relation Age of Onset    Heart disease Mother     Diabetes Mother     Heart disease Father     Coronary artery disease Father     Stroke Father         cerebrovascular accident    Heart attack Father         myocardial infarction    Sudden death Father         scd    Other Family         Back disorder    Coronary artery  disease Family     Neuropathy Family     Osteoporosis Family     No Known Problems Daughter     No Known Problems Maternal Grandmother     No Known Problems Maternal Grandfather     No Known Problems Paternal Grandmother     No Known Problems Paternal Grandfather     Cancer Maternal Uncle     Breast cancer Maternal Aunt 65    No Known Problems Son     No Known Problems Maternal Aunt     No Known Problems Maternal Aunt     No Known Problems Maternal Aunt     No Known Problems Paternal Aunt     No Known Problems Paternal Aunt     Anuerysm Neg Hx     Clotting disorder Neg Hx     Arrhythmia Neg Hx     Heart failure Neg Hx        Meds/Allergies   current meds:   Current Facility-Administered Medications   Medication Dose Route Frequency    [START ON 5/22/2024] acetylcysteine (MUCOMYST) 200 mg/mL inhalation solution 800 mg  4 mL Nebulization Daily    albuterol (PROVENTIL HFA,VENTOLIN HFA) inhaler 2 puff  2 puff Inhalation Q6H PRN    apixaban (ELIQUIS) tablet 5 mg  5 mg Oral BID    diltiazem (CARDIZEM) tablet 60 mg  60 mg Oral Q6H JOAQUINA    ezetimibe (ZETIA) tablet 10 mg  10 mg Oral Daily    flecainide (TAMBOCOR) tablet 100 mg  100 mg Oral BID    furosemide (LASIX) tablet 20 mg  20 mg Oral Daily    gabapentin (NEURONTIN) capsule 300 mg  300 mg Oral HS    ipratropium (ATROVENT) 0.03 % nasal spray 2 spray  2 spray Nasal TID    levalbuterol (XOPENEX) inhalation solution 1.25 mg  1.25 mg Nebulization Daily    losartan (COZAAR) tablet 50 mg  50 mg Oral BID    metoprolol succinate (TOPROL-XL) 24 hr tablet 100 mg  100 mg Oral Q12H    rOPINIRole (REQUIP) tablet 2 mg  2 mg Oral HS    zolpidem (AMBIEN) tablet 10 mg  10 mg Oral HS PRN    and PTA meds:    Medications Prior to Admission:     acetylcysteine (MUCOMYST) 200 mg/mL nebulizer solution    albuterol (ProAir HFA) 90 mcg/act inhaler    apixaban (ELIQUIS) 5 mg    ascorbic acid (VITAMIN C) 500 mg tablet    Cartia  MG 24 hr capsule    Cetirizine HCl (ZyrTEC Allergy) 10 MG CAPS     Cholecalciferol 50 MCG (2000 UT) CAPS    Chromium Picolinate (CHROMIUM PICOLATE PO)    ezetimibe (ZETIA) 10 mg tablet    flecainide (TAMBOCOR) 100 mg tablet    furosemide (LASIX) 20 mg tablet    gabapentin (NEURONTIN) 300 mg capsule    ipratropium (ATROVENT) 0.03 % nasal spray    losartan (COZAAR) 50 mg tablet    metoprolol succinate (TOPROL-XL) 100 mg 24 hr tablet    rOPINIRole (REQUIP) 1 mg tablet    sodium chloride 3 % inhalation solution    TURMERIC PO    zolpidem (AMBIEN) 10 mg tablet  Allergies   Allergen Reactions    Sotalol Other (See Comments)     Prolonged QTc leading to torsades de pointes     Penicillins Other (See Comments)     As a child calcium deposit in the arm     Ace Inhibitors GI Intolerance     Did feel well on it    Omeprazole Abdominal Pain, Rash and Vomiting     stomach pain, vomiting, rash       Objective   Vitals: Blood pressure 124/85, pulse (!) 124, temperature 98 °F (36.7 °C), resp. rate 20, weight 72.7 kg (160 lb 4.4 oz), last menstrual period 02/01/1990, SpO2 96%, not currently breastfeeding.  Orthostatic Blood Pressures      Flowsheet Row Most Recent Value   Blood Pressure 124/85 filed at 05/21/2024 0755   Patient Position - Orthostatic VS Sitting filed at 05/20/2024 2050              Intake/Output Summary (Last 24 hours) at 5/21/2024 0855  Last data filed at 5/20/2024 2232  Gross per 24 hour   Intake 1240 ml   Output 400 ml   Net 840 ml       Invasive Devices       Peripheral Intravenous Line  Duration             Peripheral IV 05/20/24 Distal;Left;Ventral (anterior) Forearm <1 day    Peripheral IV 05/20/24 Left Antecubital <1 day                    Physical Exam: /85   Pulse (!) 124   Temp 98 °F (36.7 °C)   Resp 20   Wt 72.7 kg (160 lb 4.4 oz)   LMP 02/01/1990 (Within Weeks)   SpO2 96%   BMI 29.31 kg/m²   General Appearance:    Alert, cooperative, no distress, appears stated age   Head:    Normocephalic, no scleral icterus   Eyes:    PERRL   Nose:   Nares normal,  septum midline, mucosa normal, no drainage    Throat:   Lips, mucosa, and tongue normal   Neck:   Supple, symmetrical, trachea midline     no carotid bruit or JVD   Back:     Symmetric   Lungs:     Clear to auscultation bilaterally, respirations unlabored   Chest Wall:    No tenderness or deformity    Heart:    Irregular rate and rhythm, S1 and S2 normal, no murmur, rub   or gallop   Abdomen:     Soft, non-tender, bowel sounds active all four quadrants,     no masses, no organomegaly   Extremities:   Extremities normal, atraumatic, no cyanosis or edema   Pulses:   2+ and symmetric all extremities   Skin:   Skin color, texture, turgor normal, no rashes or lesions   Neurologic:   Alert and oriented to person place and time. No focal deficits       Lab Results:   Recent Results (from the past 72 hour(s))   ECG 12 lead    Collection Time: 05/20/24 12:07 PM   Result Value Ref Range    Ventricular Rate 105 BPM    Atrial Rate 111 BPM    NH Interval  ms    QRSD Interval 180 ms    QT Interval 436 ms    QTC Interval 576 ms    P Axis  degrees    QRS Axis -86 degrees    T Wave Axis 99 degrees   ECG 12 lead    Collection Time: 05/20/24 12:08 PM   Result Value Ref Range    Ventricular Rate 118 BPM    Atrial Rate 118 BPM    NH Interval  ms    QRSD Interval 194 ms    QT Interval 476 ms    QTC Interval 667 ms    P Axis  degrees    QRS Axis -64 degrees    T Wave Axis 105 degrees   CBC and differential    Collection Time: 05/20/24 12:37 PM   Result Value Ref Range    WBC 8.18 4.31 - 10.16 Thousand/uL    RBC 4.02 3.81 - 5.12 Million/uL    Hemoglobin 12.4 11.5 - 15.4 g/dL    Hematocrit 36.6 34.8 - 46.1 %    MCV 91 82 - 98 fL    MCH 30.8 26.8 - 34.3 pg    MCHC 33.9 31.4 - 37.4 g/dL    RDW 14.4 11.6 - 15.1 %    MPV 10.4 8.9 - 12.7 fL    Platelets 227 149 - 390 Thousands/uL    nRBC 0 /100 WBCs    Segmented % 69 43 - 75 %    Immature Grans % 1 0 - 2 %    Lymphocytes % 17 14 - 44 %    Monocytes % 10 4 - 12 %    Eosinophils Relative 2 0 - 6 %  "   Basophils Relative 1 0 - 1 %    Absolute Neutrophils 5.78 1.85 - 7.62 Thousands/µL    Absolute Immature Grans 0.05 0.00 - 0.20 Thousand/uL    Absolute Lymphocytes 1.39 0.60 - 4.47 Thousands/µL    Absolute Monocytes 0.78 0.17 - 1.22 Thousand/µL    Eosinophils Absolute 0.12 0.00 - 0.61 Thousand/µL    Basophils Absolute 0.06 0.00 - 0.10 Thousands/µL   Comprehensive metabolic panel    Collection Time: 05/20/24 12:37 PM   Result Value Ref Range    Sodium 125 (L) 135 - 147 mmol/L    Potassium 4.1 3.5 - 5.3 mmol/L    Chloride 91 (L) 96 - 108 mmol/L    CO2 25 21 - 32 mmol/L    ANION GAP 9 4 - 13 mmol/L    BUN 24 5 - 25 mg/dL    Creatinine 1.05 0.60 - 1.30 mg/dL    Glucose 211 (H) 65 - 140 mg/dL    Calcium 9.1 8.4 - 10.2 mg/dL    AST 23 13 - 39 U/L    ALT 36 7 - 52 U/L    Alkaline Phosphatase 80 34 - 104 U/L    Total Protein 7.0 6.4 - 8.4 g/dL    Albumin 4.0 3.5 - 5.0 g/dL    Total Bilirubin 0.89 0.20 - 1.00 mg/dL    eGFR 50 ml/min/1.73sq m   HS Troponin 0hr (reflex protocol)    Collection Time: 05/20/24 12:37 PM   Result Value Ref Range    hs TnI 0hr 5 \"Refer to ACS Flowchart\"- see link ng/L   TSH, 3rd generation with Free T4 reflex    Collection Time: 05/20/24 12:37 PM   Result Value Ref Range    TSH 3RD GENERATON 2.108 0.450 - 4.500 uIU/mL   HS Troponin I 2hr    Collection Time: 05/20/24  2:39 PM   Result Value Ref Range    hs TnI 2hr 5 \"Refer to ACS Flowchart\"- see link ng/L    Delta 2hr hsTnI 0 <20 ng/L   HS Troponin I 4hr    Collection Time: 05/20/24  4:48 PM   Result Value Ref Range    hs TnI 4hr 5 \"Refer to ACS Flowchart\"- see link ng/L    Delta 4hr hsTnI 0 <20 ng/L   Basic metabolic panel    Collection Time: 05/21/24  4:55 AM   Result Value Ref Range    Sodium 128 (L) 135 - 147 mmol/L    Potassium 4.2 3.5 - 5.3 mmol/L    Chloride 96 96 - 108 mmol/L    CO2 25 21 - 32 mmol/L    ANION GAP 7 4 - 13 mmol/L    BUN 21 5 - 25 mg/dL    Creatinine 0.95 0.60 - 1.30 mg/dL    Glucose 126 65 - 140 mg/dL    Glucose, Fasting 126 " (H) 65 - 99 mg/dL    Calcium 8.6 8.4 - 10.2 mg/dL    eGFR 57 ml/min/1.73sq m   CBC (With Platelets)    Collection Time: 05/21/24  4:55 AM   Result Value Ref Range    WBC 6.68 4.31 - 10.16 Thousand/uL    RBC 4.02 3.81 - 5.12 Million/uL    Hemoglobin 12.3 11.5 - 15.4 g/dL    Hematocrit 36.9 34.8 - 46.1 %    MCV 92 82 - 98 fL    MCH 30.6 26.8 - 34.3 pg    MCHC 33.3 31.4 - 37.4 g/dL    RDW 14.6 11.6 - 15.1 %    Platelets 227 149 - 390 Thousands/uL    MPV 10.0 8.9 - 12.7 fL     Imaging: I have personally reviewed pertinent reports.    EKG V paced  bpm  VTE Prophylaxis: eliquis    Code Status: Level 3 - DNAR and DNI  Advance Directive and Living Will:      Power of :    POLST:      Counseling / Coordination of Care  Total floor / unit time spent today 45 minutes.  Greater than 50% of total time was spent with the patient and / or family counseling and / or coordination of care.

## 2024-05-21 NOTE — ASSESSMENT & PLAN NOTE
Home meds: diltiazem, metoprolol, losartan, Lasix.  Can hold losartan for high dose of rate control.

## 2024-05-21 NOTE — TELEPHONE ENCOUNTER
Pt's Daughter Ynes called stating that pt is admitted currently and was dx with vtach. She states pt is going in and out of vtach and she was requesting a call back from Dr. Cunningham to discuss this.     Please advise

## 2024-05-21 NOTE — ASSESSMENT & PLAN NOTE
Wt Readings from Last 3 Encounters:   05/21/24 72.7 kg (160 lb 4.4 oz)   05/13/24 71.2 kg (157 lb)   05/06/24 71.7 kg (158 lb)   Patient with history of heart failure on diltiazem, metoprolol, Lasix, losartan.  Has had recent worsening of lower extremity swelling which may be reflective of current rapid ventricular rate.  On exam does not appear significantly overloaded, although states that she has had a little more lower extremity swelling.  X-ray read and pending with increased haziness and right-sided small pleural effusion.  7 pounds up from a weight 1 month prior.  Likely in mild exacerbation, may need some extra diuresis after stabilization of rate.    Plan:  Continue home medications

## 2024-05-21 NOTE — RESPIRATORY THERAPY NOTE
RT Protocol Note  Farnaz Martin 78 y.o. female MRN: [id number]  Unit/Bed#: S -01 Encounter: [id number]    Assessment    Principal Problem:    Atrial fibrillation with rapid ventricular response (HCC)  Active Problems:    Essential hypertension    Hyperlipidemia    Chronic diastolic CHF (congestive heart failure) (HCC)    Hyponatremia      Home Pulmonary Medications:mucomyst and albuterol OD    Home Devices/Therapy: Other (Comment) (nebulizer)    Past Medical History:   Diagnosis Date    Actinic keratosis     last assessed - 06Jun2014    Arthritis     Atrial fibrillation with rapid ventricular response (HCC)     last assessed - 26Apr2016    Basal cell carcinoma     Benign colon polyp     last assessed - 27Apr2015    CHF (congestive heart failure) ([facility]) 06/2022    Disease of thyroid gland     Effusion of knee joint right     Resolved - 19Apr2016    Esophageal reflux     Fibromyalgia     last assessed - 27Dec2017    Fibromyalgia, primary     GERD (gastroesophageal reflux disease)     Hypertension     Palpitations     last assessed - 30Apr2013    Peroneal tendonitis, right     last assessed - [id number]    Right lumbar radiculopathy 03/17/2016    Thyroid cancer (HCC)     Thyroid nodule     Trochanteric bursitis of left hip 03/09/2018     Social History     Socioeconomic History    Marital status:      Spouse name: None    Number of children: None    Years of education: None    Highest education level: None   Occupational History    None   Tobacco Use    Smoking status: Never    Smokeless tobacco: Never   Vaping Use    Vaping status: Never Used   Substance and Sexual Activity    Alcohol use: Not Currently    Drug use: Never    Sexual activity: Not Currently   Other Topics Concern    None   Social History Narrative    None     Social Determinants of Health     Financial Resource Strain: Patient Unable To Answer (12/19/2023)    Overall Financial Resource Strain (CARDIA)     Difficulty of Paying Living  Expenses: Patient unable to answer   Food Insecurity: No Food Insecurity (1/5/2024)    Hunger Vital Sign     Worried About Running Out of Food in the Last Year: Never true     Ran Out of Food in the Last Year: Never true   Transportation Needs: No Transportation Needs (1/5/2024)    PRAPARE - Transportation     Lack of Transportation (Medical): No     Lack of Transportation (Non-Medical): No   Physical Activity: Not on file   Stress: Not on file   Social Connections: Not on file   Intimate Partner Violence: Not At Risk (7/31/2023)    [facility]    Humiliation, Afraid, Rape, and Kick questionnaire     Fear of Current or Ex-Partner: No     Emotionally Abused: No     Physically Abused: No     Sexually Abused: No   Housing Stability: Low Risk  (1/5/2024)    Housing Stability Vital Sign     Unable to Pay for Housing in the Last Year: No     Number of Places Lived in the Last Year: 1     Unstable Housing in the Last Year: No       Subjective         Objective    Physical Exam:   Assessment Type: During-treatment  General Appearance: Alert, Awake  Respiratory Pattern: Dyspnea with exertion  Chest Assessment: Chest expansion symmetrical  Bilateral Breath Sounds: Diminished  Cough: Strong, Non-productive  O2 Device: RA    Vitals:  Blood pressure 124/85, pulse (!) 124, temperature 98 °F (36.7 °C), resp. rate 18, weight 72.7 kg (160 lb 4.4 oz), last menstrual period 02/01/1990, SpO2 96%, not currently breastfeeding.          Imaging and other studies: I have personally reviewed pertinent reports.      O2 Device: RA     Plan    Respiratory Plan: Home Bronchodilator Patient pathway        Resp Comments: hx of bronchiectasis, JOHNSON, non smoker, no occupational exposures

## 2024-05-21 NOTE — PROGRESS NOTES
[facility]  Progress Note  Name: Farnaz Martin I  MRN: [id number]  Unit/Bed#: S -01 I Date of Admission: 5/20/2024   Date of Service: 5/21/2024 I Hospital Day: 0    Assessment & Plan   * Atrial fibrillation with rapid ventricular response (HCC)  Assessment & Plan  Patient presenting with 1 week history of intermittent palpitations with accompanying dyspnea on exertion.  Notes that has had low pressures and elevated heart rates to 120s with blood pressure checks at home.  No notable inciting factors as per patient.  Occasional lightheadedness and JOHNSON  BiV PPM  EKG demonstrating ventricularly paced tachycardia. Satting well on room air.  Pending pacemaker interrogation.      Plan:  Cariology consulted. Appreciate input.  Continue diltiazem 120mg BID  Continuing with home metoprolol, flecainide, Eliquis.  Pacemaker interrogation    Hyponatremia  Assessment & Plan  Lab Results   Component Value Date    SODIUM 128 (L) 05/21/2024    SODIUM 125 (L) 05/20/2024    SODIUM 134 (L) 05/07/2024      Patient with history of chronic hyponatremia around 128-134, presenting with acute exacerbation of hyponatremia to 125  S/p 1L isolyte    Plan:  Currently baseline  Monitor    Chronic diastolic CHF (congestive heart failure) (HCC)  Assessment & Plan  Wt Readings from Last 3 Encounters:   05/21/24 72.7 kg (160 lb 4.4 oz)   05/13/24 71.2 kg (157 lb)   05/06/24 71.7 kg (158 lb)   Patient with history of heart failure on diltiazem, metoprolol, Lasix, losartan.  Has had recent worsening of lower extremity swelling which may be reflective of current rapid ventricular rate.  On exam does not appear significantly overloaded, although states that she has had a little more lower extremity swelling.  X-ray read and pending with increased haziness and right-sided small pleural effusion.  7 pounds up from a weight 1 month prior.  Likely in mild exacerbation, may need some extra diuresis after stabilization of  rate.    Plan:  Continue home medications    Hyperlipidemia  Assessment & Plan  Continue ezetimibe    Essential hypertension  Assessment & Plan  Home meds: diltiazem, metoprolol, losartan, Lasix.  Can hold losartan for high dose of rate control.           VTE Pharmacologic Prophylaxis: VTE Score: 6 High Risk (Score >/= 5) - Pharmacological DVT Prophylaxis Ordered: apixaban (Eliquis). Sequential Compression Devices Ordered.    Mobility:   JH-HLM Achieved: 4: Move to chair/commode  -HLM Goal achieved. Continue to encourage appropriate mobility.    Patient Centered Rounds: I performed bedside rounds with nursing staff today.  Discussions with Specialists or Other Care Team Provider: Cardiology    Education and Discussions with Family / Patient: Attempted to update  (son in law) via phone. Left voicemail.     Current Length of Stay: 0 day(s)  Current Patient Status: Inpatient   Discharge Plan: Anticipate discharge in 24-48 hrs to discharge location to be determined pending rehab evaluations.    Code Status: Level 3 - DNAR and DNI    Subjective:   Patient was seen and examined at bedside.  OOA x 4, NAD.  Noted frequent ventricle paced tachycardia on telemetry.  Upon my evaluation, patient was eating breakfast, asymptomatic.  Denied any chest pain, SOB, NVD, abdominal pain.    Objective:     Vitals:   Temp (24hrs), Av.1 °F (36.7 °C), Min:98 °F (36.7 °C), Max:98.4 °F (36.9 °C)    Temp:  [98 °F (36.7 °C)-98.4 °F (36.9 °C)] 98 °F (36.7 °C)  HR:  [] 124  Resp:  [18-20] 18  BP: ()/(53-85) 124/85  SpO2:  [85 %-97 %] 96 %  Body mass index is 29.31 kg/m².     Input and Output Summary (last 24 hours):     Intake/Output Summary (Last 24 hours) at 2024 1454  Last data filed at 2024 1301  Gross per 24 hour   Intake 1660 ml   Output 1050 ml   Net 610 ml       Physical Exam:   Physical Exam  Vitals and nursing note reviewed.   Constitutional:       General: She is not in acute distress.      Appearance: She is well-developed. She is not ill-appearing or diaphoretic.   HENT:      Head: Normocephalic and atraumatic.      Mouth/Throat:      Mouth: Mucous membranes are moist.   Eyes:      General:         Right eye: No discharge.         Left eye: No discharge.      Extraocular Movements: Extraocular movements intact.      Conjunctiva/sclera: Conjunctivae normal.   Cardiovascular:      Rate and Rhythm: Regular rhythm. Tachycardia present.      Pulses: Normal pulses.   Pulmonary:      Effort: Pulmonary effort is normal. No respiratory distress.      Breath sounds: No stridor. No wheezing, rhonchi or rales.   Abdominal:      General: Bowel sounds are normal. There is no distension.      Palpations: Abdomen is soft.      Tenderness: There is no abdominal tenderness. There is no guarding or rebound.   Musculoskeletal:         General: No tenderness.      Cervical back: Normal range of motion. No rigidity.      Right lower leg: Edema present.      Left lower leg: Edema present.      Comments: Bilateral lower extremity trace edema.   Skin:     General: Skin is warm and dry.      Capillary Refill: Capillary refill takes less than 2 seconds.   Neurological:      Mental Status: She is alert and oriented to person, place, and time.   Psychiatric:         Mood and Affect: Mood normal.         Behavior: Behavior normal.         Thought Content: Thought content normal.         Judgment: Judgment normal.          Additional Data:     Labs:  Results from last 7 days   Lab Units 05/21/24  0455 05/20/24  1237   WBC Thousand/uL 6.68 8.18   HEMOGLOBIN g/dL 12.3 12.4   HEMATOCRIT % 36.9 36.6   PLATELETS Thousands/uL 227 227   SEGS PCT %  --  69   LYMPHO PCT %  --  17   MONO PCT %  --  10   EOS PCT %  --  2     Results from last 7 days   Lab Units 05/21/24  0455 05/20/24  1237   SODIUM mmol/L 128* 125*   POTASSIUM mmol/L 4.2 4.1   CHLORIDE mmol/L 96 91*   CO2 mmol/L 25 25   BUN mg/dL 21 24   CREATININE mg/dL 0.95 1.05   ANION  GAP mmol/L 7 9   CALCIUM mg/dL 8.6 9.1   ALBUMIN g/dL  --  4.0   TOTAL BILIRUBIN mg/dL  --  0.89   ALK PHOS U/L  --  80   ALT U/L  --  36   AST U/L  --  23   GLUCOSE RANDOM mg/dL 126 211*                       Lines/Drains:  Invasive Devices       Peripheral Intravenous Line  Duration             Peripheral IV 05/20/24 Distal;Left;Ventral (anterior) Forearm 1 day    Peripheral IV 05/20/24 Left Antecubital 1 day                      Telemetry:  Telemetry Orders (From admission, onward)               24 Hour Telemetry Monitoring  (ED Bridging Orders Panel)  Continuous x 24 Hours (Telem)        Question:  Reason for 24 Hour Telemetry  Answer:  Arrhythmias requiring acute medical intervention / PPM or ICD malfunction                     Telemetry Reviewed:  V paced tachy  Indication for Continued Telemetry Use: Arrthymias requiring medical therapy             Imaging: Reviewed radiology reports from this admission including: xray(s)    Recent Cultures (last 7 days):         Last 24 Hours Medication List:   Current Facility-Administered Medications   Medication Dose Route Frequency Provider Last Rate    [START ON 5/22/2024] acetylcysteine  4 mL Nebulization Daily Jamia Posey MD      albuterol  2 puff Inhalation Q6H PRN Diomedes Reyna MD      apixaban  5 mg Oral BID Diomedes Reyna MD      diltiazem  120 mg Oral Q12H JENNY Schmidt      ezetimibe  10 mg Oral Daily Diomedes Reyna MD      flecainide  100 mg Oral BID Diomedes Reyna MD      furosemide  20 mg Oral Daily Diomedes Reyna MD      gabapentin  300 mg Oral HS Diomedes Reyna MD      levalbuterol  1.25 mg Nebulization Daily Jamia Posey MD      metoprolol succinate  100 mg Oral Q12H Diomedes Reyna MD      rOPINIRole  2 mg Oral HS Diomedes Reyna MD      zolpidem  10 mg Oral HS PRN Diomedes Reyna MD          Today, Patient Was Seen By: Sean Foster MD    **Please Note: This note may have been constructed using a voice  recognition system.**

## 2024-05-21 NOTE — QUICK NOTE
Discontinued Cardizem drip as patient has now converted to normal sinus rhythm with a rate of 64 and has a blood pressure of 109/63.  She will get her p.o. Cardizem at 6 AM today.

## 2024-05-21 NOTE — ASSESSMENT & PLAN NOTE
Patient presenting with 1 week history of intermittent palpitations with accompanying dyspnea on exertion.  Notes that has had low pressures and elevated heart rates to 120s with blood pressure checks at home.  No notable inciting factors as per patient.  Occasional lightheadedness and JOHNSON  BiV PPM  EKG demonstrating ventricularly paced tachycardia. Satting well on room air.  Pending pacemaker interrogation.      Plan:  Cariology consulted. Appreciate input.  Continue diltiazem 120mg BID  Continuing with home metoprolol, flecainide, Eliquis.  Pacemaker interrogation

## 2024-05-22 VITALS
BODY MASS INDEX: 29.35 KG/M2 | RESPIRATION RATE: 17 BRPM | HEART RATE: 65 BPM | SYSTOLIC BLOOD PRESSURE: 109 MMHG | WEIGHT: 160.5 LBS | DIASTOLIC BLOOD PRESSURE: 54 MMHG | OXYGEN SATURATION: 97 % | TEMPERATURE: 98.4 F

## 2024-05-22 LAB
ANION GAP SERPL CALCULATED.3IONS-SCNC: 7 MMOL/L (ref 4–13)
BUN SERPL-MCNC: 23 MG/DL (ref 5–25)
CALCIUM SERPL-MCNC: 9.2 MG/DL (ref 8.4–10.2)
CHLORIDE SERPL-SCNC: 97 MMOL/L (ref 96–108)
CO2 SERPL-SCNC: 26 MMOL/L (ref 21–32)
CREAT SERPL-MCNC: 0.91 MG/DL (ref 0.6–1.3)
GFR SERPL CREATININE-BSD FRML MDRD: 60 ML/MIN/1.73SQ M
GLUCOSE SERPL-MCNC: 142 MG/DL (ref 65–140)
MAGNESIUM SERPL-MCNC: 1.9 MG/DL (ref 1.9–2.7)
POTASSIUM SERPL-SCNC: 4.5 MMOL/L (ref 3.5–5.3)
SODIUM SERPL-SCNC: 130 MMOL/L (ref 135–147)

## 2024-05-22 PROCEDURE — 99239 HOSP IP/OBS DSCHRG MGMT >30: CPT | Performed by: INTERNAL MEDICINE

## 2024-05-22 PROCEDURE — 83735 ASSAY OF MAGNESIUM: CPT

## 2024-05-22 PROCEDURE — 80048 BASIC METABOLIC PNL TOTAL CA: CPT

## 2024-05-22 PROCEDURE — 99232 SBSQ HOSP IP/OBS MODERATE 35: CPT | Performed by: INTERNAL MEDICINE

## 2024-05-22 PROCEDURE — 94760 N-INVAS EAR/PLS OXIMETRY 1: CPT

## 2024-05-22 PROCEDURE — 94640 AIRWAY INHALATION TREATMENT: CPT

## 2024-05-22 RX ORDER — FLECAINIDE ACETATE 150 MG/1
150 TABLET ORAL 2 TIMES DAILY
Qty: 60 TABLET | Refills: 0 | Status: SHIPPED | OUTPATIENT
Start: 2024-05-22 | End: 2024-06-21

## 2024-05-22 RX ADMIN — FUROSEMIDE 20 MG: 20 TABLET ORAL at 08:37

## 2024-05-22 RX ADMIN — APIXABAN 5 MG: 5 TABLET, FILM COATED ORAL at 08:37

## 2024-05-22 RX ADMIN — ACETYLCYSTEINE 800 MG: 200 INHALANT RESPIRATORY (INHALATION) at 07:39

## 2024-05-22 RX ADMIN — EZETIMIBE 10 MG: 10 TABLET ORAL at 08:37

## 2024-05-22 RX ADMIN — DILTIAZEM HYDROCHLORIDE 120 MG: 60 CAPSULE, EXTENDED RELEASE ORAL at 00:55

## 2024-05-22 RX ADMIN — METOPROLOL SUCCINATE 100 MG: 100 TABLET, EXTENDED RELEASE ORAL at 06:21

## 2024-05-22 RX ADMIN — LEVALBUTEROL HYDROCHLORIDE 1.25 MG: 1.25 SOLUTION RESPIRATORY (INHALATION) at 07:39

## 2024-05-22 RX ADMIN — DILTIAZEM HYDROCHLORIDE 120 MG: 60 CAPSULE, EXTENDED RELEASE ORAL at 13:14

## 2024-05-22 RX ADMIN — FLECAINIDE ACETATE 150 MG: 50 TABLET ORAL at 08:37

## 2024-05-22 NOTE — PLAN OF CARE
Problem: SAFETY ADULT  Goal: Maintain or return to baseline ADL function  Description: INTERVENTIONS:  -  Assess patient's ability to carry out ADLs; assess patient's baseline for ADL function and identify physical deficits which impact ability to perform ADLs (bathing, care of mouth/teeth, toileting, grooming, dressing, etc.)  - Assess/evaluate cause of self-care deficits   - Assess range of motion  - Assess patient's mobility; develop plan if impaired  - Assess patient's need for assistive devices and provide as appropriate  - Encourage maximum independence but intervene and supervise when necessary  - Involve family in performance of ADLs  - Assess for home care needs following discharge   - Consider OT consult to assist with ADL evaluation and planning for discharge  - Provide patient education as appropriate  Outcome: Progressing  Goal: Maintains/Returns to pre admission functional level  Description: INTERVENTIONS:  - Perform AM-PAC 6 Click Basic Mobility/ Daily Activity assessment daily.  - Set and communicate daily mobility goal to care team and patient/family/caregiver.   - Collaborate with rehabilitation services on mobility goals if consulted  - Perform Range of Motion   - Out of bed for toileting  - Record patient progress and toleration of activity level   Outcome: Progressing

## 2024-05-22 NOTE — ASSESSMENT & PLAN NOTE
Lab Results   Component Value Date    SODIUM 130 (L) 05/22/2024    SODIUM 128 (L) 05/21/2024    SODIUM 125 (L) 05/20/2024      Patient with history of chronic hyponatremia around 128-134, presenting with acute exacerbation of hyponatremia to 125  S/p 1L isolyte    Plan:  Currently stable at baseline  Medically stable for discharge.

## 2024-05-22 NOTE — CASE MANAGEMENT
Case Management Assessment & Discharge Planning Note    Patient name Farnaz Martin  Location S /S -01 MRN [id number]  : 1945 Date 2024       Current Admission Date: 2024  Current Admission Diagnosis:Atrial fibrillation with rapid ventricular response (HCC)   Patient Active Problem List    Diagnosis Date Noted Date Diagnosed    Bilateral leg edema 2024     Left renal mass 2024     Chronic cough 2024     Bronchiectasis without complication ([facility]) 2024     Multiple thyroid nodules 2024     Abnormal CT of the chest 2023     S/P revision of total knee, left 2023    Nonrheumatic mitral valve regurgitation 2023     Nonrheumatic aortic valve insufficiency 2023     Nonrheumatic tricuspid valve regurgitation 2023     Pulmonary hypertension ([facility]) 2022     Hyponatremia 2022     Meningioma ([facility]) 2022     Chronic tension-type headache, not intractable 2022     Vasomotor rhinitis 2021     MGUS (monoclonal gammopathy of unknown significance) 2021     Hurthle cell adenoma of thyroid 2021     Tremors of nervous system 2021     Hx of diplopia 2021     S/P placement of cardiac pacemaker 2021     Current use of long term anticoagulation 2021     Malignant neoplasm of thyroid gland ([facility]) 2021     Chronic diastolic CHF (congestive heart failure) ([facility]) 2021     Chronic rhinitis 11/10/2020     Arthritis of both acromioclavicular joints 2020     Carpal tunnel syndrome on right 2019     Osteoarthritis of shoulder      TMJ syndrome 10/18/2017     Atrial fibrillation with rapid ventricular response (HCC) 2016     Essential hypertension 2016     Peripheral neuropathy 2016     Lumbar spondylosis 2015     Lumbar stenosis 2015     Restless legs syndrome 2014     Allergic rhinitis 2013     Osteoarthritis of knee  04/01/2013     Insomnia 01/04/2013     Osteopenia 11/26/2012     Fibromyalgia 10/16/2012     Hyperlipidemia 10/16/2012     Osteoarthrosis, hand 10/16/2012     Vitamin D deficiency 10/16/2012       LOS (days): 1  Geometric Mean LOS (GMLOS) (days): 3.9  Days to GMLOS:2.9     OBJECTIVE:    Risk of Unplanned Readmission Score: 16.92         Current admission status: Inpatient       Preferred Pharmacy:   [id number] [place] [facility] [address]  [address]  [place] [address]  Phone: [phone] Fax: [phone]    Primary Care Provider: Naveen Monsivais MD    Primary Insurance: MEDICARE  Secondary Insurance: AARP    ASSESSMENT:  Active Health Care Proxies    There are no active Health Care Proxies on file.                 Readmission Root Cause  30 Day Readmission: No    Patient Information  Admitted from:: Home  Mental Status: Alert  During Assessment patient was accompanied by: Not accompanied during assessment  Assessment information provided by:: Patient  Primary Caregiver: Self  Support Systems: Self, Daughter, Family members  County of Residence: [place]  What city do you live in?: [place]  Home entry access options. Select all that apply.: Stairs  Number of steps to enter home.: 2  Type of Current Residence: [facility]  Living Arrangements: Lives Alone    Activities of Daily Living Prior to Admission  Functional Status: Independent  Completes ADLs independently?: Yes  Ambulates independently?: Yes  Does patient currently own DME?: Yes  What DME does the patient currently own?: Straight Cane, Shower Chair, Bedside Commode  Does patient have a history of Outpatient Therapy (PT/OT)?: Yes  Does the patient have a history of Short-Term Rehab?: No  Does patient have a history of HHC?: Yes  Does patient currently have HHC?: No         Patient Information Continued  Income Source: Pension/FCI  Does patient have prescription coverage?: Yes  Does patient receive dialysis treatments?: No  Does patient  have a history of substance abuse?: No  Does patient have a history of Mental Health Diagnosis?: No    PHQ 2/9 Screening   Reviewed PHQ 2/9 Depression Screening Score?: No    Means of Transportation  Means of Transport to Appts:: Drives Self      Social Determinants of Health (SDOH)      Flowsheet Row Most Recent Value   Housing Stability    In the last 12 months, was there a time when you were not able to pay the mortgage or rent on time? N   In the past 12 months, how many times have you moved where you were living? 1   At any time in the past 12 months, were you homeless or living in a shelter (including now)? N   Transportation Needs    In the past 12 months, has lack of transportation kept you from medical appointments or from getting medications? no   In the past 12 months, has lack of transportation kept you from meetings, work, or from getting things needed for daily living? No   Food Insecurity    Within the past 12 months, you worried that your food would run out before you got the money to buy more. Never true   Within the past 12 months, the food you bought just didn't last and you didn't have money to get more. Never true   Utilities    In the past 12 months has the electric, gas, oil, or water company threatened to shut off services in your home? No            DISCHARGE DETAILS:    Discharge planning discussed with:: Patient  Freedom of Choice: Yes  Comments - Freedom of Choice: Pt reported preference of returning home  CM contacted family/caregiver?: No- see comments (Pt A&O)  Were Treatment Team discharge recommendations reviewed with patient/caregiver?: Yes  Did patient/caregiver verbalize understanding of patient care needs?: Yes  Were patient/caregiver advised of the risks associated with not following Treatment Team discharge recommendations?: Yes    Contacts  Patient Contacts: Farnaz  Relationship to Patient:: Other (Comment) (Self)  Contact Method: In Person  Reason/Outcome: Continuity of Care,  Discharge Planning    Requested Home Health Care         Is the patient interested in HHC at discharge?: No    DME Referral Provided  Referral made for DME?: No    Other Referral/Resources/Interventions Provided:  Interventions: Other (Specify)  Referral Comments: CM spoke with pt at bedside, introduced self and role with discharge planning. Pt reported she lives by herself in a ranch style home with 2 CHIN. Pt reported her daughter lives next door. Pt reported she was fully independent with ADLs and functional mobility. Pt reported she has a cane, shower chair, and BSC. Pt reported she only uses the cane and shower chair when needed. Pt reported having a hx of outpatient PT and [facility]. Pt reported her preferred pharmacy is [facility]. Pt reported her PCP is Naveen Monsivais MD. Pt reported her daughter Ynes is her POA. Pt reported driving self to appointments. CM will continue to follow.    Would you like to participate in our [facility] service program?  : No - Declined    Treatment Team Recommendation: Home  Discharge Destination Plan:: Home

## 2024-05-22 NOTE — ASSESSMENT & PLAN NOTE
Care Coordination:    -Left message for Dr. Roberts's nurse to inquire whether or not the pt will need to stop chemo while at TCU.    Deann Yan RN, BAN   Care Coordinator  Ph. [phone]        Patient presenting with 1 week history of intermittent palpitations with accompanying dyspnea on exertion.  Notes that has had low pressures and elevated heart rates to 120s with blood pressure checks at home.  No notable inciting factors as per patient.  Occasional lightheadedness and JOHNSON  BiV PPM  EKG demonstrating ?ventricularly paced tachycardia. Satting well on room air.  Pacemaker interrogation- Atrial tachycardia.  5/21: Per cardiology discussion with EP: additional flecainide 100 mg given (total 300mg) with rate controlled ranging 60-70s.    Plan:  Cariology consulted. Appreciate input.  Increase flecainide to 150 mg twice daily.  Continue diltiazem 120mg BID  Continuing with home metoprolol, Eliquis.  Medically stable for discharge.

## 2024-05-22 NOTE — ASSESSMENT & PLAN NOTE
Home meds: diltiazem, metoprolol, losartan, Lasix.  Will continue to hold losartan at discharge.  Patient was advised to check BP routinely.  If blood pressure elevated again, can restart.  Outpatient follow-up with PCP.

## 2024-05-22 NOTE — DISCHARGE SUMMARY
[facility]  Discharge- Farnaz Martin 1945, 78 y.o. female MRN: [id number]  Unit/Bed#: S -01 Encounter: [id number]  Primary Care Provider: Naveen Monsivais MD   Date and time admitted to hospital: 5/20/2024 11:59 AM    * Atrial fibrillation with rapid ventricular response (HCC)  Assessment & Plan  Patient presenting with 1 week history of intermittent palpitations with accompanying dyspnea on exertion.  Notes that has had low pressures and elevated heart rates to 120s with blood pressure checks at home.  No notable inciting factors as per patient.  Occasional lightheadedness and JOHNSON  BiV PPM  EKG demonstrating ?ventricularly paced tachycardia. Satting well on room air.  Pacemaker interrogation- Atrial tachycardia.  5/21: Per cardiology discussion with EP: additional flecainide 100 mg given (total 300mg) with rate controlled ranging 60-70s.    Plan:  Cariology consulted. Appreciate input.  Increase flecainide to 150 mg twice daily.  Continue diltiazem 120mg BID  Continuing with home metoprolol, Eliquis.  Medically stable for discharge.    Hyponatremia  Assessment & Plan  Lab Results   Component Value Date    SODIUM 130 (L) 05/22/2024    SODIUM 128 (L) 05/21/2024    SODIUM 125 (L) 05/20/2024      Patient with history of chronic hyponatremia around 128-134, presenting with acute exacerbation of hyponatremia to 125  S/p 1L isolyte    Plan:  Currently stable at baseline  Medically stable for discharge.    Chronic diastolic CHF (congestive heart failure) (HCC)  Assessment & Plan  Not in AE.  Continue home medications    Hyperlipidemia  Assessment & Plan  Continue ezetimibe    Essential hypertension  Assessment & Plan  Home meds: diltiazem, metoprolol, losartan, Lasix.  Will continue to hold losartan at discharge.  Patient was advised to check BP routinely.  If blood pressure elevated again, can restart.  Outpatient follow-up with PCP.      Medical Problems       Resolved Problems   Date Reviewed: 5/22/2024   None       Discharging Resident: Sean Foster MD  Discharging Attending: Jamia Posey MD  PCP: Naveen Monsivais MD  Admission Date:   Admission Orders (From admission, onward)       Ordered        05/21/24 1449  INPATIENT ADMISSION  Once            05/20/24 1405  Place in Observation  Once                          Discharge Date: 05/22/24    Consultations During Hospital Stay:  Cardiology    Procedures Performed:   None    Significant Findings / Test Results:   XR chest 1 view portable   Final Result by Naveen Carrillo MD (05/22 1221)      Prominent interstitial markings with probable small pleural effusions consistent with mild pulmonary edema.      Workstation performed: [id number]           ICD interrogation demonstrated atrial tachycardia.    Incidental Findings:   None    Test Results Pending at Discharge (will require follow up):  None     Outpatient Tests Requested:  None    Complications:  None    Reason for Admission: Palpitation    Hospital Course:   Farnaz Martin is a 78 y.o. 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  In the ED, EKG showed undetermined rhythm likely ventricular paced vs atrial tachy vs A-fib with RVR.  Final pacemaker in interrogation revealed atrial tachycardia.  Cardiology was consulted and per their discussion with EP service-patient received extra dose flecainide 100 mg on 5/21 with heart rate well-controlled from 60 to 70s.  She will be discharged on flecainide 150 mg twice daily.  We are currently holding her losartan since based on current regimen, her blood pressure is well-controlled.  She will follow-up with outpatient PCP to monitor her blood  pressure and restart losartan if blood pressure elevated.  She is medically stable to be discharged today.      Please see above list of diagnoses and related plan for additional information.     Condition at Discharge: good    Discharge Day Visit / Exam:   Subjective: Patient was seen and examined at bedside this morning OOA x 4, NAD.  No overnight acute event.  Heart rate well-controlled from 60 to 70s.  Denies any chest pain, shortness of breath, palpitation, lightheadedness, abdominal pain, NVD.  Vitals: Blood Pressure: 109/54 (05/22/24 1316)  Pulse: 65 (05/22/24 1316)  Temperature: 98.4 °F (36.9 °C) (05/22/24 1146)  Respirations: 17 (05/22/24 1146)  Weight - Scale: 72.8 kg (160 lb 7.9 oz) (05/22/24 0600)  SpO2: 97 % (05/22/24 1316)  Exam:   Physical Exam  Vitals and nursing note reviewed.   Constitutional:       General: She is not in acute distress.     Appearance: She is well-developed. She is not ill-appearing or diaphoretic.   HENT:      Head: Normocephalic and atraumatic.      Mouth/Throat:      Mouth: Mucous membranes are moist.   Eyes:      General:         Right eye: No discharge.         Left eye: No discharge.      Extraocular Movements: Extraocular movements intact.      Conjunctiva/sclera: Conjunctivae normal.   Cardiovascular:      Rate and Rhythm: Normal rate and regular rhythm.      Pulses: Normal pulses.   Pulmonary:      Effort: Pulmonary effort is normal. No respiratory distress.      Breath sounds: No stridor. No wheezing, rhonchi or rales.   Abdominal:      General: Bowel sounds are normal. There is no distension.      Palpations: Abdomen is soft.      Tenderness: There is no abdominal tenderness. There is no guarding or rebound.   Musculoskeletal:         General: No tenderness.      Cervical back: Normal range of motion. No rigidity.      Right lower leg: Edema present.      Left lower leg: Edema present.      Comments: Bilateral lower extremity trace edema.   Skin:     General: Skin is  warm and dry.      Capillary Refill: Capillary refill takes less than 2 seconds.   Neurological:      Mental Status: She is alert and oriented to person, place, and time.   Psychiatric:         Mood and Affect: Mood normal.         Behavior: Behavior normal.         Thought Content: Thought content normal.         Judgment: Judgment normal.          Discussion with Family: Updated  (son in law) via phone.    Discharge instructions/Information to patient and family:   See after visit summary for information provided to patient and family.      Provisions for Follow-Up Care:  See after visit summary for information related to follow-up care and any pertinent home health orders.      Mobility at time of Discharge:   Basic Mobility Inpatient Raw Score: 18  JH-HLM Goal: 6: Walk 10 steps or more  JH-HLM Achieved: 3: Sit at edge of bed  HLM Goal achieved. Continue to encourage appropriate mobility.  Patient ambulated herself in her room at the time of my evaluation.     Disposition:   Home    Planned Readmission: None    Discharge Medications:  See after visit summary for reconciled discharge medications provided to patient and/or family.      **Please Note: This note may have been constructed using a voice recognition system**

## 2024-05-22 NOTE — PLAN OF CARE
Problem: SAFETY ADULT  Goal: Maintain or return to baseline ADL function  Description: INTERVENTIONS:  -  Assess patient's ability to carry out ADLs; assess patient's baseline for ADL function and identify physical deficits which impact ability to perform ADLs (bathing, care of mouth/teeth, toileting, grooming, dressing, etc.)  - Assess/evaluate cause of self-care deficits   - Assess range of motion  - Assess patient's mobility; develop plan if impaired  - Assess patient's need for assistive devices and provide as appropriate  - Encourage maximum independence but intervene and supervise when necessary  - Involve family in performance of ADLs  - Assess for home care needs following discharge   - Consider OT consult to assist with ADL evaluation and planning for discharge  - Provide patient education as appropriate  Outcome: Progressing  Goal: Maintains/Returns to pre admission functional level  Description: INTERVENTIONS:  - Perform AM-PAC 6 Click Basic Mobility/ Daily Activity assessment daily.  - Set and communicate daily mobility goal to care team and patient/family/caregiver.   - Collaborate with rehabilitation services on mobility goals if consulted  - Perform Range of Motion 2 times a day.  - Reposition patient every 2 hours.  - Dangle patient 2 times a day  - Stand patient 2 times a day  - Ambulate patient 2 times a day  - Out of bed to chair 2 times a day   - Out of bed for meals 2 times a day  - Out of bed for toileting  - Record patient progress and toleration of activity level   Outcome: Progressing

## 2024-05-22 NOTE — DISCHARGE INSTR - AVS FIRST PAGE
Dear Farnaz Martin,     It was our pleasure to care for you here at [facility].  It is our hope that we were always able to exceed the expected standards for your care during your stay.  You were hospitalized due to palpitation.  You were cared for on the [place] third floor by Sean Foster MD under the service of Jamia Posey MD with the [facility] who covers for your primary care physician (PCP), Naveen Monsivais MD, while you were hospitalized.  If you have any questions or concerns related to this hospitalization, you may contact us at .  For follow up as well as any medication refills, we recommend that you follow up with your primary care physician.  A registered nurse will reach out to you by phone within a few days after your discharge to answer any additional questions that you may have after going home.  However, at this time we provide for you here, the most important instructions / recommendations at discharge:     Notable Medication Adjustments -   Please START to take flecainide 150 mg twice daily as prescribed.  Please STOP to take losartan.  Please check your blood pressure routinely or discuss with your PCP or cardiologist to restart losartan if your blood pressure elevated.  Please continue to take all of your other medications as prescribed.  Testing Required after Discharge -   None  ** Please contact your PCP to request testing orders for any of the testing recommended here **  Important follow up information -   Please follow-up with your PCP within 1 week of discharge.  Please follow-up with your [facility]ologist at [facility] as scheduled.  Other Instructions -   Please return back to the ER if you are experiencing the and worsening palpitation, chest pain, shortness of breath, lightheadedness.  Please get and stay well.  It is my pleasure to take care of you in the hospital.  Please review this entire after visit summary as  additional general instructions including medication list, appointments, activity, diet, any pertinent wound care, and other additional recommendations from your care team that may be provided for you.      Sincerely,     Sean Foster MD

## 2024-05-22 NOTE — PROGRESS NOTES
----- Message from [employer] sent at 1/28/2022  9:53 AM EST -----  Subject: Appointment Request    Reason for Call: Routine (Patient Request) No Script    QUESTIONS  Type of Appointment? Established Patient  Reason for appointment request? Other - no answer from office   Additional Information for Provider? Patient's wife needed to schedule   appointment for orthostatic measurement, Provider is on maternity leave   wanted to schedule with another provider in facility. Please call back. ---------------------------------------------------------------------------  --------------  Luzmaria CHAO  What is the best way for the office to contact you? OK to leave message on   voicemail  Preferred Call Back Phone Number? [phone]  ---------------------------------------------------------------------------  --------------  SCRIPT ANSWERS  Relationship to Patient? Other  Representative Name? Jethro Vika  Additional information verified (besides Name and Date of Birth)? Address  (Is the patient requesting to see the provider for a procedure?)? No  (Is the patient requesting to see the provider urgently  today or   tomorrow. )? No  Have you been diagnosed with, awaiting test results for, or told that you   are suspected of having COVID-19 (Coronavirus)? (If patient has tested   negative or was tested as a requirement for work, school, or travel and   not based on symptoms, answer no)? No  Within the past two weeks have you developed any of the following symptoms   (answer no if symptoms have been present longer than 2 weeks or began   more than 2 weeks ago)? Fever or Chills, Cough, Shortness of breath or   difficulty breathing, Loss of taste or smell, Sore throat, Nasal   congestion, Sneezing or runny nose, Fatigue or generalized body aches   (answer no if pain is specific to a body part e.g. back pain), Diarrhea,   Headache? No  Have you had close contact with someone with COVID-19 in the last 14 days? General Cardiology   Progress Note -  [facility]   Farnaz Martin 78 y.o. female MRN: [id number]  Unit/Bed#: S -01 Encounter: [id number]    Assessment    Paroxysmal atrial dysrhythmia  Hx of atrial fibrillation s/p ablation x 2, atrial flutter ablation as well  History of atrial tachycardia on device interrogations  Presenting with palpitations and exertional fatigue  PTA-Tambocor 100 mg twice daily, Toprol  mg twice daily and diltiazem 120 mg twice a day  History of QTc prolongation and torsades with sotalol  Device interrogation demonstrated atrial tachycardia.  Flecainide dosing increased to 150 mg twice daily after discussion with EP.  Anticoagulated on Eliquis 5 mg twice daily    Chronic diastolic CHF  Euvolemic on exam, takes Lasix 20 mg daily    HTN-will, 24-hour average /80 on Toprol- mg every 12 hours, diltiazem 120 mg twice daily. Losartan stopped to allow BP room for AV jeremiah blockers    HLD-statin therapy    Hyponatremia-improving, sodium 130 today, from 128 yesterday    Moderate MR-with preserved LV function based on TTE from #2023    Moderate pulmonary hypertension    Plan/discussion  Feeling much better today, no further palpitations or exertional symptoms  Stable for d/c with follow up planned at [facility]  Continue increased flecainide dose at d/c - 150 mg BID  Continue Toprol  mg BID, continue diltiazem 120 mg BID, and anticoagulation with Eliquis 5 mg BID.  Continue to hold losartan for now. If BP elevated at follow up then can resume.    Subjective  Review of Systems   Constitutional: Negative for chills, malaise/fatigue and weight gain.   Cardiovascular:  Negative for chest pain, dyspnea on exertion, leg swelling, orthopnea, palpitations and syncope.   Respiratory:  Negative for cough, shortness of breath, sleep disturbances due to breathing and sputum production.    Endocrine: Negative.    Hematologic/Lymphatic: Negative.    Gastrointestinal:  Negative for bloating and  No  (Service Expert  click yes below to proceed with [employer] As Usual   Scheduling)?  Yes nausea.   Genitourinary: Negative.    Neurological:  Negative for dizziness, light-headedness and weakness.   Psychiatric/Behavioral:  Negative for altered mental status.    All other systems reviewed and are negative.    Objective:   Vitals: Blood pressure 136/81, pulse 60, temperature 98.8 °F (37.1 °C), resp. rate 18, weight 72.8 kg (160 lb 7.9 oz), last menstrual period 1990, SpO2 95%, not currently breastfeeding., Body mass index is 29.35 kg/m².,     Systolic (24hrs), Av , Min:133 , Max:137     Diastolic (24hrs), Av, Min:72, Max:90      Intake/Output Summary (Last 24 hours) at 2024 1044  Last data filed at 2024 0849  Gross per 24 hour   Intake 720 ml   Output 750 ml   Net -30 ml     Wt Readings from Last 3 Encounters:   24 72.8 kg (160 lb 7.9 oz)   24 71.2 kg (157 lb)   24 71.7 kg (158 lb)     Telemetry Review: AV paced, brief atrial tachycardia    Physical Exam  Vitals reviewed.   Constitutional:       General: She is not in acute distress.  Neck:      Vascular: No hepatojugular reflux or JVD.   Cardiovascular:      Rate and Rhythm: Normal rate and regular rhythm.      Pulses: Normal pulses.      Heart sounds: Normal heart sounds. No murmur heard.     No friction rub. No gallop.   Pulmonary:      Effort: Pulmonary effort is normal. No respiratory distress.      Breath sounds: Normal breath sounds. No rales.   Abdominal:      General: Bowel sounds are normal. There is no distension.      Palpations: Abdomen is soft.      Tenderness: There is no abdominal tenderness.   Musculoskeletal:         General: No tenderness. Normal range of motion.      Cervical back: Neck supple.      Right lower leg: No edema.      Left lower leg: No edema.   Skin:     General: Skin is warm and dry.      Capillary Refill: Capillary refill takes less than 2 seconds.      Findings: No erythema.   Neurological:      Mental Status: She is alert and oriented to person, place, and time.    Psychiatric:         Mood and Affect: Mood normal.         LABORATORY RESULTS      CBC with diff:   Results from last 7 days   Lab Units 05/21/24  0455 05/20/24  1237   WBC Thousand/uL 6.68 8.18   HEMOGLOBIN g/dL 12.3 12.4   HEMATOCRIT % 36.9 36.6   MCV fL 92 91   PLATELETS Thousands/uL 227 227   RBC Million/uL 4.02 4.02   MCH pg 30.6 30.8   MCHC g/dL 33.3 33.9   RDW % 14.6 14.4   MPV fL 10.0 10.4   NRBC AUTO /100 WBCs  --  0       CMP:  Results from last 7 days   Lab Units 05/22/24  0625 05/21/24 0455 05/20/24  1237   POTASSIUM mmol/L 4.5 4.2 4.1   CHLORIDE mmol/L 97 96 91*   CO2 mmol/L 26 25 25   BUN mg/dL 23 21 24   CREATININE mg/dL 0.91 0.95 1.05   CALCIUM mg/dL 9.2 8.6 9.1   AST U/L  --   --  23   ALT U/L  --   --  36   ALK PHOS U/L  --   --  80   EGFR ml/min/1.73sq m 60 57 50       BMP:  Results from last 7 days   Lab Units 05/22/24 0625 05/21/24 0455 05/20/24  1237   POTASSIUM mmol/L 4.5 4.2 4.1   CHLORIDE mmol/L 97 96 91*   CO2 mmol/L 26 25 25   BUN mg/dL 23 21 24   CREATININE mg/dL 0.91 0.95 1.05   CALCIUM mg/dL 9.2 8.6 9.1       Lab Results   Component Value Date    CREATININE 0.91 05/22/2024    CREATININE 0.95 05/21/2024    CREATININE 1.05 05/20/2024       Lab Results   Component Value Date    NTBNP 761 (H) 01/08/2021    NTBNP 2,773 (H) 01/03/2021    NTBNP 506 (H) 03/25/2019           Results from last 7 days   Lab Units 05/22/24  0625 05/21/24  0455   MAGNESIUM mg/dL 1.9 1.9                 Results from last 7 days   Lab Units 05/20/24  1237   TSH 3RD GENERATON uIU/mL 2.108             Lipid Profile:   Lab Results   Component Value Date    CHOL 205 05/06/2015    CHOL 195 07/10/2014     Lab Results   Component Value Date    HDL 46 (L) 01/09/2024    HDL 53 07/03/2023    HDL 66 04/01/2021     Lab Results   Component Value Date    LDLCALC 64 01/09/2024    LDLCALC 96 07/03/2023    LDLCALC 88 04/01/2021     Lab Results   Component Value Date    TRIG 63 01/09/2024    TRIG 68 07/03/2023    TRIG 112  2021       Cardiac testing:   Results for orders placed during the hospital encounter of 21    Echo complete with contrast if indicated    [facility]  [address], [place] [id number] [phone]    Transthoracic Echocardiogram  2D, M-mode, Doppler, and Color Doppler    Study date:  2021    Patient: TANISHA LEVINE  MR number: [id number]  Account number: [id number]  : 1945  Age: 75 years  Gender: Female  Status: Inpatient  Location: Bedside  Height: 62 in  Weight: 151.6 lb  BP: 126/ 94 mmHg    Indications: Atrial fibrillation    Diagnoses: I48.0 - Atrial fibrillation    Sonographer:  IGNAICO Cameron  Primary Physician:  Naveen Monsivais MD  Referring Physician:  Shantal Huff MD  Group:  [facility]  Interpreting Physician:  Kwabena Seymour MD    SUMMARY    LEFT VENTRICLE:  Systolic function was normal. Ejection fraction was estimated to be 55 %.  There were no regional wall motion abnormalities.  Wall thickness was at the upper limits of normal.  Doppler parameters were consistent with elevated ventricular end-diastolic filling pressure.    LEFT ATRIUM:  The atrium was mildly to moderately dilated.    RIGHT ATRIUM:  The atrium was mildly dilated.    MITRAL VALVE:  There was mild stenosis.    TRICUSPID VALVE:  There was mild to moderate regurgitation.  Pulmonary artery systolic pressure was mildly increased.    HISTORY: PRIOR HISTORY: HLD, HTN, PAF, chronic CHF, thyroid nodule    PROCEDURE: The procedure was performed at the bedside. This was a routine study. The transthoracic approach was used. The study included complete 2D imaging, M-mode, complete spectral Doppler, and color Doppler. The heart rate was 101 bpm,  at the start of the study. Images were obtained from the parasternal, apical, subcostal, and suprasternal notch acoustic windows. Echocardiographic views were limited due to lung interference. This was a  technically difficult study.    LEFT VENTRICLE: Size was normal. Systolic function was normal. Ejection fraction was estimated to be 55 %. There were no regional wall motion abnormalities. Wall thickness was at the upper limits of normal. DOPPLER: Transmitral flow  pattern: atrial fibrillation. Left ventricular diastolic function parameters were abnormal. Doppler parameters were consistent with elevated ventricular end-diastolic filling pressure.    RIGHT VENTRICLE: The size was normal. Systolic function was normal. Wall thickness was normal.    LEFT ATRIUM: The atrium was mildly to moderately dilated.    RIGHT ATRIUM: The atrium was mildly dilated.    MITRAL VALVE: Valve structure was normal. There was normal leaflet separation. DOPPLER: The transmitral velocity was within the normal range. There was mild stenosis. There was no significant regurgitation.    AORTIC VALVE: The valve was trileaflet. Leaflets exhibited normal thickness and normal cuspal separation. DOPPLER: Transaortic velocity was within the normal range. There was no evidence for stenosis. There was no significant  regurgitation.    TRICUSPID VALVE: The valve structure was normal. There was normal leaflet separation. DOPPLER: The transtricuspid velocity was within the normal range. There was no evidence for stenosis. There was mild to moderate regurgitation. Pulmonary  artery systolic pressure was mildly increased.    PULMONIC VALVE: Leaflets exhibited normal thickness, no calcification, and normal cuspal separation. DOPPLER: The transpulmonic velocity was within the normal range. There was no significant regurgitation.    PERICARDIUM: There was no pericardial effusion. The pericardium was normal in appearance.    AORTA: The root exhibited normal size.    SYSTEMIC VEINS: IVC: The inferior vena cava was normal in size.    PULMONARY VEINS: DOPPLER: Doppler signals were not recordable in the pulmonary vein(s).    SYSTEM MEASUREMENT TABLES    2D  %FS:  37.15 %  Ao Diam: 2.74 cm  Ao asc: 2.72 cm  EDV(Teich): 103.17 ml  EF(Teich): 67.06 %  ESV(Teich): 33.98 ml  IVSd: 0.94 cm  LA Area: 13.2 cm2  LA Diam: 3.98 cm  LVEDV MOD A4C: 32.01 ml  LVEF MOD A4C: 63.74 %  LVESV MOD A4C: 11.61 ml  LVIDd: 4.72 cm  LVIDs: 2.96 cm  LVLd A4C: 5.9 cm  LVLs A4C: 4.79 cm  LVPWd: 0.93 cm  RA Area: 8.67 cm2  RVIDd: 3.35 cm  SV MOD A4C: 20.4 ml  SV(Teich): 69.19 ml    CW  TR MaxP.39 mmHg  TR Vmax: 2.89 m/s    MM  TAPSE: 1.51 cm    [facility]    Prepared and electronically signed by    Kwabena Seymour MD  Signed 2021 11:05:34    No results found for this or any previous visit.    No results found for this or any previous visit.    No valid procedures specified.  Results for orders placed during the hospital encounter of 10/05/22    NM myocardial perfusion spect (stress and/or rest)    Interpretation Summary    Resting ECG: ECG is abnormal. Resting ECG shows no ST-segment deviation. Patient a paced and V sensed.    Perfusion Defect Conclusion: The stress/rest perfusion ratio is 1.04 . There is no evidence of transient ischemic dilation (TID).    Perfusion: There is a left ventricular perfusion defect that is small in size with mild reduction in uptake present in the mid to apical anterior and anteroseptal location(s) that is predominantly fixed. The defect appears to be an artifact caused by breast attenuation.    Stress Function: Left ventricular function post-stress is normal. Post-stress ejection fraction is 70 %.    Negative exercise pharmacological stress test    Meds/Allergies   all current active meds have been reviewed and current meds:   Current Facility-Administered Medications   Medication Dose Route Frequency    acetylcysteine (MUCOMYST) 200 mg/mL inhalation solution 800 mg  4 mL Nebulization Daily    albuterol (PROVENTIL HFA,VENTOLIN HFA) inhaler 2 puff  2 puff Inhalation Q6H PRN    apixaban (ELIQUIS) tablet 5 mg   5 mg Oral BID    diltiazem (CARDIZEM SR) 12 hr capsule 120 mg  120 mg Oral Q12H    ezetimibe (ZETIA) tablet 10 mg  10 mg Oral Daily    flecainide (TAMBOCOR) tablet 150 mg  150 mg Oral BID    furosemide (LASIX) tablet 20 mg  20 mg Oral Daily    gabapentin (NEURONTIN) capsule 300 mg  300 mg Oral HS    levalbuterol (XOPENEX) inhalation solution 1.25 mg  1.25 mg Nebulization Daily    metoprolol succinate (TOPROL-XL) 24 hr tablet 100 mg  100 mg Oral Q12H    rOPINIRole (REQUIP) tablet 2 mg  2 mg Oral HS    zolpidem (AMBIEN) tablet 10 mg  10 mg Oral HS PRN       Medications Prior to Admission:     acetylcysteine (MUCOMYST) 200 mg/mL nebulizer solution    albuterol (ProAir HFA) 90 mcg/act inhaler    apixaban (ELIQUIS) 5 mg    ascorbic acid (VITAMIN C) 500 mg tablet    Cartia  MG 24 hr capsule    Cetirizine HCl (ZyrTEC Allergy) 10 MG CAPS    Cholecalciferol 50 MCG (2000 UT) CAPS    Chromium Picolinate (CHROMIUM PICOLATE PO)    ezetimibe (ZETIA) 10 mg tablet    flecainide (TAMBOCOR) 100 mg tablet    furosemide (LASIX) 20 mg tablet    gabapentin (NEURONTIN) 300 mg capsule    ipratropium (ATROVENT) 0.03 % nasal spray    losartan (COZAAR) 50 mg tablet    metoprolol succinate (TOPROL-XL) 100 mg 24 hr tablet    rOPINIRole (REQUIP) 1 mg tablet    sodium chloride 3 % inhalation solution    TURMERIC PO    zolpidem (AMBIEN) 10 mg tablet    Counseling / Coordination of Care  Total floor / unit time spent today 20 minutes.  Greater than 50% of total time was spent with the patient and / or family counseling and / or coordination of care.      ** Please Note: Dragon 360 Dictation voice to text software may have been used in the creation of this document. **

## 2024-05-22 NOTE — CASE MANAGEMENT
Case Management Discharge Planning Note    Patient name Farnaz Martin  Location S /S -01 MRN [id number]  : 1945 Date 2024       Current Admission Date: 2024  Current Admission Diagnosis:Atrial fibrillation with rapid ventricular response (HCC)   Patient Active Problem List    Diagnosis Date Noted Date Diagnosed    Bilateral leg edema 2024     Left renal mass 2024     Chronic cough 2024     Bronchiectasis without complication (HCC) 2024     Multiple thyroid nodules 2024     Abnormal CT of the chest 2023     S/P revision of total knee, left 2023    Nonrheumatic mitral valve regurgitation 2023     Nonrheumatic aortic valve insufficiency 2023     Nonrheumatic tricuspid valve regurgitation 2023     Pulmonary hypertension (HCC) 2022     Hyponatremia 2022     Meningioma ([facility]) 2022     Chronic tension-type headache, not intractable 2022     Vasomotor rhinitis 2021     MGUS (monoclonal gammopathy of unknown significance) 2021     Hurthle cell adenoma of thyroid 2021     Tremors of nervous system 2021     Hx of diplopia 2021     S/P placement of cardiac pacemaker 2021     Current use of long term anticoagulation 2021     Malignant neoplasm of thyroid gland (HCC) 2021     Chronic diastolic CHF (congestive heart failure) ([facility]) 2021     Chronic rhinitis 11/10/2020     Arthritis of both acromioclavicular joints 2020     Carpal tunnel syndrome on right 2019     Osteoarthritis of shoulder      TMJ syndrome 10/18/2017     Atrial fibrillation with rapid ventricular response (HCC) 2016     Essential hypertension 2016     Peripheral neuropathy 2016     Lumbar spondylosis 2015     Lumbar stenosis 2015     Restless legs syndrome 2014     Allergic rhinitis 2013     Osteoarthritis of knee 2013      Insomnia 01/04/2013     Osteopenia 11/26/2012     Fibromyalgia 10/16/2012     Hyperlipidemia 10/16/2012     Osteoarthrosis, hand 10/16/2012     Vitamin D deficiency 10/16/2012       LOS (days): 1  Geometric Mean LOS (GMLOS) (days): 3.9  Days to GMLOS:2.9     OBJECTIVE:  Risk of Unplanned Readmission Score: 16.92         Current admission status: Inpatient   Preferred Pharmacy:   [id number] - [place] - [address] [place] [address]  [place] [address]  Phone: [phone] Fax: [phone]    Primary Care Provider: Naveen Monsivais MD    Primary Insurance: MEDICARE  Secondary Insurance: [facility]    DISCHARGE DETAILS:                                                                                     IMM reviewed with patient and caregiver, patient and caregiver agrees with discharge determination.   IMM Given (Date):: 05/22/24  IMM Given to:: Patient  Family notified:: Patient and pt's son-in-law Zane at bedside

## 2024-05-23 ENCOUNTER — TRANSITIONAL CARE MANAGEMENT (OUTPATIENT)
Dept: FAMILY MEDICINE CLINIC | Facility: CLINIC | Age: 79
End: 2024-05-23

## 2024-05-23 DIAGNOSIS — Z71.89 COMPLEX CARE COORDINATION: Primary | ICD-10-CM

## 2024-05-24 ENCOUNTER — APPOINTMENT (INPATIENT)
Dept: RADIOLOGY | Facility: HOSPITAL | Age: 79
DRG: 091 | End: 2024-05-24
Payer: MEDICARE

## 2024-05-24 ENCOUNTER — PATIENT OUTREACH (OUTPATIENT)
Dept: FAMILY MEDICINE CLINIC | Facility: CLINIC | Age: 79
End: 2024-05-24

## 2024-05-24 ENCOUNTER — APPOINTMENT (EMERGENCY)
Dept: RADIOLOGY | Facility: HOSPITAL | Age: 79
DRG: 091 | End: 2024-05-24
Payer: MEDICARE

## 2024-05-24 ENCOUNTER — HOSPITAL ENCOUNTER (INPATIENT)
Facility: HOSPITAL | Age: 79
LOS: 3 days | Discharge: HOME HEALTH | DRG: 091 | End: 2024-05-27
Attending: SURGERY | Admitting: INTERNAL MEDICINE
Payer: MEDICARE

## 2024-05-24 ENCOUNTER — APPOINTMENT (EMERGENCY)
Dept: CT IMAGING | Facility: HOSPITAL | Age: 79
DRG: 091 | End: 2024-05-24
Payer: MEDICARE

## 2024-05-24 ENCOUNTER — RA CDI HCC (OUTPATIENT)
Dept: OTHER | Facility: HOSPITAL | Age: 79
End: 2024-05-24

## 2024-05-24 DIAGNOSIS — I48.0 PAROXYSMAL ATRIAL FIBRILLATION (HCC): ICD-10-CM
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
## 2024-05-24 DIAGNOSIS — J47.9 BRONCHIECTASIS (HCC): ICD-10-CM

## 2024-05-24 DIAGNOSIS — I50.32 CHRONIC DIASTOLIC CHF (CONGESTIVE HEART FAILURE) (HCC): ICD-10-CM

## 2024-05-24 DIAGNOSIS — E78.5 HYPERLIPIDEMIA: ICD-10-CM

## 2024-05-24 PROBLEM — E87.1 HYPONATREMIA: Status: ACTIVE | Noted: 2024-05-24

## 2024-05-24 PROBLEM — R26.2 AMBULATORY DYSFUNCTION: Status: ACTIVE | Noted: 2024-05-24

## 2024-05-24 PROBLEM — R09.02 HYPOXIA: Status: ACTIVE | Noted: 2024-05-24

## 2024-05-24 PROBLEM — I10 ESSENTIAL HYPERTENSION: Status: ACTIVE | Noted: 2024-05-24

## 2024-05-24 PROBLEM — R29.6 FALLS: Status: ACTIVE | Noted: 2024-01-01

## 2024-05-24 LAB
2HR DELTA HS TROPONIN: 0 NG/L
2HR DELTA HS TROPONIN: 0 NG/L
4HR DELTA HS TROPONIN: 0 NG/L
4HR DELTA HS TROPONIN: 0 NG/L
ANION GAP SERPL CALCULATED.3IONS-SCNC: 8 MMOL/L (ref 4–13)
ANION GAP SERPL CALCULATED.3IONS-SCNC: 8 MMOL/L (ref 4–13)
APTT PPP: 32 SECONDS (ref 23–37)
APTT PPP: 32 SECONDS (ref 23–37)
ATRIAL RATE: 61 BPM
BASE EXCESS BLDA CALC-SCNC: -1 MMOL/L (ref -2–3)
BASE EXCESS BLDA CALC-SCNC: -1 MMOL/L (ref -2–3)
BASOPHILS # BLD AUTO: 0.05 THOUSANDS/ÂΜL (ref 0–0.1)
BASOPHILS # BLD AUTO: 0.05 THOUSANDS/ÂΜL (ref 0–0.1)
BASOPHILS NFR BLD AUTO: 1 % (ref 0–1)
BASOPHILS NFR BLD AUTO: 1 % (ref 0–1)
BUN SERPL-MCNC: 34 MG/DL (ref 5–25)
BUN SERPL-MCNC: 34 MG/DL (ref 5–25)
CA-I BLD-SCNC: 1.14 MMOL/L (ref 1.12–1.32)
CA-I BLD-SCNC: 1.14 MMOL/L (ref 1.12–1.32)
CALCIUM SERPL-MCNC: 9.1 MG/DL (ref 8.4–10.2)
CALCIUM SERPL-MCNC: 9.1 MG/DL (ref 8.4–10.2)
CARDIAC TROPONIN I PNL SERPL HS: 11 NG/L
CHLORIDE SERPL-SCNC: 95 MMOL/L (ref 96–108)
CHLORIDE SERPL-SCNC: 95 MMOL/L (ref 96–108)
CO2 SERPL-SCNC: 25 MMOL/L (ref 21–32)
CO2 SERPL-SCNC: 25 MMOL/L (ref 21–32)
CREAT SERPL-MCNC: 0.99 MG/DL (ref 0.6–1.3)
CREAT SERPL-MCNC: 0.99 MG/DL (ref 0.6–1.3)
EOSINOPHIL # BLD AUTO: 0.08 THOUSAND/ÂΜL (ref 0–0.61)
EOSINOPHIL # BLD AUTO: 0.08 THOUSAND/ÂΜL (ref 0–0.61)
EOSINOPHIL NFR BLD AUTO: 1 % (ref 0–6)
EOSINOPHIL NFR BLD AUTO: 1 % (ref 0–6)
ERYTHROCYTE [DISTWIDTH] IN BLOOD BY AUTOMATED COUNT: 15 % (ref 11.6–15.1)
ERYTHROCYTE [DISTWIDTH] IN BLOOD BY AUTOMATED COUNT: 15 % (ref 11.6–15.1)
GFR SERPL CREATININE-BSD FRML MDRD: 54 ML/MIN/1.73SQ M
GFR SERPL CREATININE-BSD FRML MDRD: 54 ML/MIN/1.73SQ M
GLUCOSE SERPL-MCNC: 157 MG/DL (ref 65–140)
GLUCOSE SERPL-MCNC: 157 MG/DL (ref 65–140)
GLUCOSE SERPL-MCNC: 170 MG/DL (ref 65–140)
GLUCOSE SERPL-MCNC: 170 MG/DL (ref 65–140)
HCO3 BLDA-SCNC: 23.2 MMOL/L (ref 24–30)
HCO3 BLDA-SCNC: 23.2 MMOL/L (ref 24–30)
HCT VFR BLD AUTO: 34 % (ref 34.8–46.1)
HCT VFR BLD AUTO: 34 % (ref 34.8–46.1)
HCT VFR BLD CALC: 37 % (ref 34.8–46.1)
HCT VFR BLD CALC: 37 % (ref 34.8–46.1)
HGB BLD-MCNC: 11.4 G/DL (ref 11.5–15.4)
HGB BLD-MCNC: 11.4 G/DL (ref 11.5–15.4)
HGB BLDA-MCNC: 12.6 G/DL (ref 11.5–15.4)
HGB BLDA-MCNC: 12.6 G/DL (ref 11.5–15.4)
IMM GRANULOCYTES # BLD AUTO: 0.04 THOUSAND/UL (ref 0–0.2)
IMM GRANULOCYTES # BLD AUTO: 0.04 THOUSAND/UL (ref 0–0.2)
IMM GRANULOCYTES NFR BLD AUTO: 0 % (ref 0–2)
IMM GRANULOCYTES NFR BLD AUTO: 0 % (ref 0–2)
INR PPP: 1.62 (ref 0.84–1.19)
INR PPP: 1.62 (ref 0.84–1.19)
LYMPHOCYTES # BLD AUTO: 1.46 THOUSANDS/ÂΜL (ref 0.6–4.47)
LYMPHOCYTES # BLD AUTO: 1.46 THOUSANDS/ÂΜL (ref 0.6–4.47)
LYMPHOCYTES NFR BLD AUTO: 15 % (ref 14–44)
LYMPHOCYTES NFR BLD AUTO: 15 % (ref 14–44)
MCH RBC QN AUTO: 30.7 PG (ref 26.8–34.3)
MCH RBC QN AUTO: 30.7 PG (ref 26.8–34.3)
MCHC RBC AUTO-ENTMCNC: 33.5 G/DL (ref 31.4–37.4)
MCHC RBC AUTO-ENTMCNC: 33.5 G/DL (ref 31.4–37.4)
MCV RBC AUTO: 92 FL (ref 82–98)
MCV RBC AUTO: 92 FL (ref 82–98)
MONOCYTES # BLD AUTO: 1.02 THOUSAND/ÂΜL (ref 0.17–1.22)
MONOCYTES # BLD AUTO: 1.02 THOUSAND/ÂΜL (ref 0.17–1.22)
MONOCYTES NFR BLD AUTO: 11 % (ref 4–12)
MONOCYTES NFR BLD AUTO: 11 % (ref 4–12)
NEUTROPHILS # BLD AUTO: 6.96 THOUSANDS/ÂΜL (ref 1.85–7.62)
NEUTROPHILS # BLD AUTO: 6.96 THOUSANDS/ÂΜL (ref 1.85–7.62)
NEUTS SEG NFR BLD AUTO: 72 % (ref 43–75)
NEUTS SEG NFR BLD AUTO: 72 % (ref 43–75)
NRBC BLD AUTO-RTO: 0 /100 WBCS
NRBC BLD AUTO-RTO: 0 /100 WBCS
OSMOLALITY UR/SERPL-RTO: 284 MMOL/KG (ref 282–298)
OSMOLALITY UR/SERPL-RTO: 284 MMOL/KG (ref 282–298)
OSMOLALITY UR: 729 MMOL/KG
OSMOLALITY UR: 729 MMOL/KG
PCO2 BLD: 24 MMOL/L (ref 21–32)
PCO2 BLD: 24 MMOL/L (ref 21–32)
PCO2 BLD: 36.8 MM HG (ref 42–50)
PCO2 BLD: 36.8 MM HG (ref 42–50)
PH BLD: 7.41 [PH] (ref 7.3–7.4)
PH BLD: 7.41 [PH] (ref 7.3–7.4)
PLATELET # BLD AUTO: 211 THOUSANDS/UL (ref 149–390)
PLATELET # BLD AUTO: 211 THOUSANDS/UL (ref 149–390)
PMV BLD AUTO: 9.8 FL (ref 8.9–12.7)
PMV BLD AUTO: 9.8 FL (ref 8.9–12.7)
PO2 BLD: 23 MM HG (ref 35–45)
PO2 BLD: 23 MM HG (ref 35–45)
POTASSIUM BLD-SCNC: 4.1 MMOL/L (ref 3.5–5.3)
POTASSIUM BLD-SCNC: 4.1 MMOL/L (ref 3.5–5.3)
POTASSIUM SERPL-SCNC: 4.1 MMOL/L (ref 3.5–5.3)
POTASSIUM SERPL-SCNC: 4.1 MMOL/L (ref 3.5–5.3)
PR INTERVAL: 232 MS
PROTHROMBIN TIME: 20 SECONDS (ref 11.6–14.5)
PROTHROMBIN TIME: 20 SECONDS (ref 11.6–14.5)
QRS AXIS: -66 DEGREES
QRSD INTERVAL: 182 MS
QT INTERVAL: 546 MS
QTC INTERVAL: 549 MS
RBC # BLD AUTO: 3.71 MILLION/UL (ref 3.81–5.12)
RBC # BLD AUTO: 3.71 MILLION/UL (ref 3.81–5.12)
SAO2 % BLD FROM PO2: 40 % (ref 60–85)
SAO2 % BLD FROM PO2: 40 % (ref 60–85)
SODIUM 24H UR-SCNC: 38 MOL/L
SODIUM 24H UR-SCNC: 38 MOL/L
SODIUM BLD-SCNC: 130 MMOL/L (ref 136–145)
SODIUM BLD-SCNC: 130 MMOL/L (ref 136–145)
SODIUM SERPL-SCNC: 128 MMOL/L (ref 135–147)
SODIUM SERPL-SCNC: 128 MMOL/L (ref 135–147)
SPECIMEN SOURCE: ABNORMAL
SPECIMEN SOURCE: ABNORMAL
T WAVE AXIS: 104 DEGREES
VENTRICULAR RATE: 61 BPM
WBC # BLD AUTO: 9.61 THOUSAND/UL (ref 4.31–10.16)
WBC # BLD AUTO: 9.61 THOUSAND/UL (ref 4.31–10.16)

## 2024-05-24 PROCEDURE — NC001 PR NO CHARGE: Performed by: SURGERY

## 2024-05-24 PROCEDURE — 83930 ASSAY OF BLOOD OSMOLALITY: CPT

## 2024-05-24 PROCEDURE — 72125 CT NECK SPINE W/O DYE: CPT

## 2024-05-24 PROCEDURE — 94640 AIRWAY INHALATION TREATMENT: CPT

## 2024-05-24 PROCEDURE — 83935 ASSAY OF URINE OSMOLALITY: CPT

## 2024-05-24 PROCEDURE — 84300 ASSAY OF URINE SODIUM: CPT

## 2024-05-24 PROCEDURE — 93005 ELECTROCARDIOGRAM TRACING: CPT

## 2024-05-24 PROCEDURE — 85025 COMPLETE CBC W/AUTO DIFF WBC: CPT | Performed by: SURGERY

## 2024-05-24 PROCEDURE — 82803 BLOOD GASES ANY COMBINATION: CPT

## 2024-05-24 PROCEDURE — 71045 X-RAY EXAM CHEST 1 VIEW: CPT

## 2024-05-24 PROCEDURE — 85610 PROTHROMBIN TIME: CPT | Performed by: SURGERY

## 2024-05-24 PROCEDURE — 85014 HEMATOCRIT: CPT

## 2024-05-24 PROCEDURE — 80048 BASIC METABOLIC PNL TOTAL CA: CPT | Performed by: SURGERY

## 2024-05-24 PROCEDURE — 82330 ASSAY OF CALCIUM: CPT

## 2024-05-24 PROCEDURE — 85730 THROMBOPLASTIN TIME PARTIAL: CPT | Performed by: SURGERY

## 2024-05-24 PROCEDURE — 84295 ASSAY OF SERUM SODIUM: CPT

## 2024-05-24 PROCEDURE — 84484 ASSAY OF TROPONIN QUANT: CPT

## 2024-05-24 PROCEDURE — 97163 PT EVAL HIGH COMPLEX 45 MIN: CPT

## 2024-05-24 PROCEDURE — 94760 N-INVAS EAR/PLS OXIMETRY 1: CPT

## 2024-05-24 PROCEDURE — 72100 X-RAY EXAM L-S SPINE 2/3 VWS: CPT

## 2024-05-24 PROCEDURE — 99223 1ST HOSP IP/OBS HIGH 75: CPT | Performed by: INTERNAL MEDICINE

## 2024-05-24 PROCEDURE — 70450 CT HEAD/BRAIN W/O DYE: CPT

## 2024-05-24 PROCEDURE — 99285 EMERGENCY DEPT VISIT HI MDM: CPT

## 2024-05-24 PROCEDURE — 36415 COLL VENOUS BLD VENIPUNCTURE: CPT | Performed by: SURGERY

## 2024-05-24 PROCEDURE — 84484 ASSAY OF TROPONIN QUANT: CPT | Performed by: SURGERY

## 2024-05-24 PROCEDURE — 84132 ASSAY OF SERUM POTASSIUM: CPT

## 2024-05-24 PROCEDURE — 99285 EMERGENCY DEPT VISIT HI MDM: CPT | Performed by: SURGERY

## 2024-05-24 PROCEDURE — 82947 ASSAY GLUCOSE BLOOD QUANT: CPT

## 2024-05-24 RX ORDER — FUROSEMIDE 10 MG/ML
20 INJECTION INTRAMUSCULAR; INTRAVENOUS ONCE
Status: COMPLETED | OUTPATIENT
Start: 2024-05-24 | End: 2024-05-24

## 2024-05-24 RX ORDER — ALBUTEROL SULFATE 2.5 MG/3ML
5 SOLUTION RESPIRATORY (INHALATION) ONCE
Status: COMPLETED | OUTPATIENT
Start: 2024-05-24 | End: 2024-05-24

## 2024-05-24 RX ORDER — FLECAINIDE ACETATE 50 MG/1
100 TABLET ORAL 2 TIMES DAILY
Status: DISCONTINUED | OUTPATIENT
Start: 2024-05-24 | End: 2024-05-27 | Stop reason: HOSPADM

## 2024-05-24 RX ORDER — ROPINIROLE 2 MG/1
2 TABLET, FILM COATED, EXTENDED RELEASE ORAL
COMMUNITY

## 2024-05-24 RX ORDER — FLECAINIDE ACETATE 50 MG/1
150 TABLET ORAL 2 TIMES DAILY
Status: DISCONTINUED | OUTPATIENT
Start: 2024-05-24 | End: 2024-05-24

## 2024-05-24 RX ORDER — DILTIAZEM HYDROCHLORIDE 120 MG/1
120 CAPSULE, COATED, EXTENDED RELEASE ORAL 2 TIMES DAILY
COMMUNITY

## 2024-05-24 RX ORDER — GABAPENTIN 300 MG/1
300 CAPSULE ORAL
COMMUNITY

## 2024-05-24 RX ORDER — ROPINIROLE 1 MG/1
2 TABLET, FILM COATED ORAL
Status: DISCONTINUED | OUTPATIENT
Start: 2024-05-24 | End: 2024-05-27 | Stop reason: HOSPADM

## 2024-05-24 RX ORDER — GABAPENTIN 300 MG/1
300 CAPSULE ORAL
Status: DISCONTINUED | OUTPATIENT
Start: 2024-05-24 | End: 2024-05-27 | Stop reason: HOSPADM

## 2024-05-24 RX ORDER — ZOLPIDEM TARTRATE 5 MG/1
10 TABLET ORAL
Status: DISCONTINUED | OUTPATIENT
Start: 2024-05-24 | End: 2024-05-27 | Stop reason: HOSPADM

## 2024-05-24 RX ORDER — ALBUTEROL SULFATE 90 UG/1
2 AEROSOL, METERED RESPIRATORY (INHALATION) EVERY 6 HOURS PRN
Status: DISCONTINUED | OUTPATIENT
Start: 2024-05-24 | End: 2024-05-27 | Stop reason: HOSPADM

## 2024-05-24 RX ORDER — ACETYLCYSTEINE 200 MG/ML
400 SOLUTION ORAL; RESPIRATORY (INHALATION) EVERY 4 HOURS PRN
Status: DISCONTINUED | OUTPATIENT
Start: 2024-05-24 | End: 2024-05-25

## 2024-05-24 RX ORDER — FUROSEMIDE 20 MG/1
20 TABLET ORAL 2 TIMES DAILY
COMMUNITY
End: 2024-05-24

## 2024-05-24 RX ORDER — FLECAINIDE ACETATE 150 MG/1
150 TABLET ORAL 2 TIMES DAILY
COMMUNITY
End: 2024-05-27

## 2024-05-24 RX ORDER — METOPROLOL SUCCINATE 100 MG/1
100 TABLET, EXTENDED RELEASE ORAL 2 TIMES DAILY
Status: DISCONTINUED | OUTPATIENT
Start: 2024-05-24 | End: 2024-05-27 | Stop reason: HOSPADM

## 2024-05-24 RX ORDER — ASCORBIC ACID 500 MG
500 TABLET ORAL DAILY
Status: DISCONTINUED | OUTPATIENT
Start: 2024-05-24 | End: 2024-05-27 | Stop reason: HOSPADM

## 2024-05-24 RX ORDER — DILTIAZEM HYDROCHLORIDE 120 MG/1
120 CAPSULE, COATED, EXTENDED RELEASE ORAL DAILY
Status: DISCONTINUED | OUTPATIENT
Start: 2024-05-24 | End: 2024-05-27 | Stop reason: HOSPADM

## 2024-05-24 RX ORDER — ACETAMINOPHEN 325 MG/1
975 TABLET ORAL EVERY 8 HOURS PRN
Status: DISCONTINUED | OUTPATIENT
Start: 2024-05-24 | End: 2024-05-27 | Stop reason: HOSPADM

## 2024-05-24 RX ORDER — EZETIMIBE 10 MG/1
10 TABLET ORAL
Status: DISCONTINUED | OUTPATIENT
Start: 2024-05-24 | End: 2024-05-27 | Stop reason: HOSPADM

## 2024-05-24 RX ORDER — ALBUTEROL SULFATE 2.5 MG/3ML
SOLUTION RESPIRATORY (INHALATION)
Status: COMPLETED
Start: 2024-05-24 | End: 2024-05-24

## 2024-05-24 RX ORDER — ACETYLCYSTEINE 100 MG/ML
4 SOLUTION ORAL; RESPIRATORY (INHALATION) EVERY 4 HOURS PRN
COMMUNITY

## 2024-05-24 RX ORDER — METOPROLOL SUCCINATE 100 MG/1
100 TABLET, EXTENDED RELEASE ORAL 2 TIMES DAILY
COMMUNITY

## 2024-05-24 RX ADMIN — OXYCODONE HYDROCHLORIDE AND ACETAMINOPHEN 500 MG: 500 TABLET ORAL at 08:38

## 2024-05-24 RX ADMIN — FLECAINIDE ACETATE 100 MG: 50 TABLET ORAL at 21:15

## 2024-05-24 RX ADMIN — IPRATROPIUM BROMIDE 2 PUFF: 17 AEROSOL, METERED RESPIRATORY (INHALATION) at 12:07

## 2024-05-24 RX ADMIN — EZETIMIBE 10 MG: 10 TABLET ORAL at 21:15

## 2024-05-24 RX ADMIN — FLECAINIDE ACETATE 150 MG: 50 TABLET ORAL at 08:39

## 2024-05-24 RX ADMIN — ALBUTEROL SULFATE 5 MG: 2.5 SOLUTION RESPIRATORY (INHALATION) at 05:05

## 2024-05-24 RX ADMIN — ACETAMINOPHEN 975 MG: 325 TABLET, FILM COATED ORAL at 21:21

## 2024-05-24 RX ADMIN — METOPROLOL SUCCINATE 100 MG: 100 TABLET, EXTENDED RELEASE ORAL at 21:15

## 2024-05-24 RX ADMIN — FUROSEMIDE 20 MG: 10 INJECTION, SOLUTION INTRAMUSCULAR; INTRAVENOUS at 09:25

## 2024-05-24 RX ADMIN — ZOLPIDEM TARTRATE 10 MG: 5 TABLET ORAL at 21:21

## 2024-05-24 RX ADMIN — ROPINIROLE HYDROCHLORIDE 2 MG: 1 TABLET, FILM COATED ORAL at 21:15

## 2024-05-24 RX ADMIN — GABAPENTIN 300 MG: 300 CAPSULE ORAL at 21:15

## 2024-05-24 RX ADMIN — APIXABAN 5 MG: 5 TABLET, FILM COATED ORAL at 08:38

## 2024-05-24 RX ADMIN — IPRATROPIUM BROMIDE 0.5 MG: 0.5 SOLUTION RESPIRATORY (INHALATION) at 05:07

## 2024-05-24 RX ADMIN — DILTIAZEM HYDROCHLORIDE 120 MG: 120 CAPSULE, COATED, EXTENDED RELEASE ORAL at 08:37

## 2024-05-24 RX ADMIN — IPRATROPIUM BROMIDE 2 PUFF: 17 AEROSOL, METERED RESPIRATORY (INHALATION) at 17:58

## 2024-05-24 RX ADMIN — IPRATROPIUM BROMIDE 2 PUFF: 17 AEROSOL, METERED RESPIRATORY (INHALATION) at 21:25

## 2024-05-24 RX ADMIN — APIXABAN 5 MG: 5 TABLET, FILM COATED ORAL at 21:15

## 2024-05-24 RX ADMIN — METOPROLOL SUCCINATE 100 MG: 100 TABLET, EXTENDED RELEASE ORAL at 08:39

## 2024-05-24 RX ADMIN — ACETYLCYSTEINE 400 MG: 200 SOLUTION ORAL; RESPIRATORY (INHALATION) at 22:15

## 2024-05-24 NOTE — ASSESSMENT & PLAN NOTE
Assessment:  Patient reports having 2 falls today the one in which was observed by her daughter where she fell face flat  Denies syncope but endorses more frequent palpitations than her baseline  Feels that she is having more blurry vision as of lately, is seeing ophthalmologist outpatient. Has bilateral cataract extraction in past.  Complains of 2/10 lumbar spine pain with coughing, but 1/10 at rest  Sodium 128 but chronically low  Hgb 11.4  POCT BG showed pH 7.407, pCO2 36.8, pO2 23.0, HCO3 23.2, Sodium 130  Trauma workup was unremarkable: CT head demonstrates a hyperdensity in the left frontotemporal region.  After speaking with patient and chart review, this hyperdensity was present on a CT in 2022 and patient was diagnosed with a hemangioma at that time. CT C-spine negative for acute traumatic injuries. FAST exam negative      Plan:  Monitor telemetry  F/u ECHO  Replete electrolytes  PT/OT   Pain control with tylenol for mild pain and oxycodoe for moderate pain  Lidocaine patch as needed

## 2024-05-24 NOTE — ASSESSMENT & PLAN NOTE
Assessment:  Patient reports having 2 falls today the one in which was observed by her daughter where she fell face flat  Denies syncope but endorses more frequent palpitations than her baseline  Feels that she is having more blurry vision as of lately, is seeing ophthalmologist outpatient. Has bilateral cataract extraction in past.  Complains of 2/10   Sodium 128 but chronically low  Hgb 11.4  POCT BG showed pH 7.407, pCO2 36.8, pO2 23.0, HCO3 23.2, Sodium 130  Trauma workup was unremarkable      Plan:  Monitor telemetry  ECHO complete  Replete electrolytes  PT/OT consult  Pain control with tylenol for mild pain and oxycodoe for moderate pain  Lidocaine patch as needed

## 2024-05-24 NOTE — OCCUPATIONAL THERAPY NOTE
Occupational Therapy Cancellation Note     Patient Name: Katiuska Che  Today's Date: 5/24/2024 05/24/24 1309   Note Type   Note type Cancelled Session   Cancel Reasons Other   Additional Comments OT consult received and chart reviewed. Pt admit w/ recurrent falls, pending lumbar X-Ray at this time. Will hold OT evaluation pending formal read and f/u as able and appropriate.     Trudi Osullivan MS OTR/L   [place] Licensure# [id number]

## 2024-05-24 NOTE — ASSESSMENT & PLAN NOTE
>>ASSESSMENT AND PLAN FOR HYPERLIPIDEMIA WRITTEN ON 5/24/2024  6:36 AM BY BARNEY DUMONT MD    Continue ezetimibe

## 2024-05-24 NOTE — ASSESSMENT & PLAN NOTE
>>ASSESSMENT AND PLAN FOR ESSENTIAL HYPERTENSION WRITTEN ON 5/24/2024  6:28 AM BY BARNEY DUMONT MD    Assessment:  Home Meds: dilitiazem, metoprolol, lasix  /73 on admission    Plan:  Continue home medications  Outpatient follow-up with PCP

## 2024-05-24 NOTE — ASSESSMENT & PLAN NOTE
Assessment:  Home Meds: dilitiazem, metoprolol, lasix  /73 on admission    Plan:  Continue home medications  Outpatient follow-up with PCP at time of discharge

## 2024-05-24 NOTE — ASSESSMENT & PLAN NOTE
Assessment:  Patient with a history of chronic hyponatremia around 128-134      Plan:  Monitor BMP

## 2024-05-24 NOTE — ASSESSMENT & PLAN NOTE
>>ASSESSMENT AND PLAN FOR ESSENTIAL HYPERTENSION WRITTEN ON 5/25/2024 12:16 PM BY ASIYA LOVELACE MD    Assessment:  Home Meds: dilitiazem, metoprolol, lasix  /73 on admission    Plan:  Continue home medications  Outpatient follow-up with PCP at time of discharge

## 2024-05-24 NOTE — ASSESSMENT & PLAN NOTE
Assessment:  Home Meds: dilitiazem, metoprolol, lasix  /73 on admission    Plan:  Continue home medications  Outpatient follow-up with PCP

## 2024-05-24 NOTE — ASSESSMENT & PLAN NOTE
Assessment:  Patient with a history of chronic hyponatremia around 128-134  Currently Na 128      Plan:  Monitor BMP

## 2024-05-24 NOTE — PROCEDURES
POC FAST US    Date/Time: 5/24/2024 5:54 AM    Performed by: Bridgett Rouse PA-C  Authorized by: Bridgett Rouse PA-C    Patient location:  Trauma  Other Assisting Provider: No    Procedure details:     Exam Type:  Diagnostic    Indications: blunt abdominal trauma and blunt chest trauma      Assess for:  Intra-abdominal fluid and pericardial effusion    Technique: FAST      Views obtained:  Heart - Pericardial sac, LUQ - Splenorenal space, RUQ - Duff's Pouch and Suprapubic - Pouch of Vishal    Image quality: diagnostic      Image availability:  Images available in PACS  FAST Findings:     RUQ (Hepatorenal) free fluid: absent      LUQ (Splenorenal) free fluid: absent      Suprapubic free fluid: absent      Cardiac wall motion: identified      Pericardial effusion: absent    Interpretation:     Impressions: negative

## 2024-05-24 NOTE — CONSULTS
"Consultation - Cardiology Team 1  Katiuska Che 78 y.o. female MRN: [id number]  Unit/Bed#: ED-23 Encounter: [id number]    Assessment & Plan     Principal Problem:    Falls  Active Problems:    Ambulatory dysfunction    Hyponatremia    Hyperlipidemia    Chronic diastolic CHF (congestive heart failure) (HCC)    Essential hypertension      Assessment  Falls- recurrent        Pt felt \"off balance\" prior to falls         No preceding lightheadedness, dizziness.        She has been more short of breath        Presented hypoxic and CXR to suggest volume overload        Daughter reports she is \"off\" today  Acute hypoxic respiratory failure        Suspect mild volume overload   Acute on chronic diastolic heart failure        Suspect recent atrial arrhythmias may have precipitated volume retention.  Additionally she        had intravenous fluids for hyponatremia on her recent admission.        She has been more short of breath since recent discharge        Takes 20mg oral lasix daily.  Given 20 mg IV Lasix in the ED.  PAF/flutter/atrial tachycardia- currently maintaining sinus rhythm.  Occasional palpitation but improved from recent discharge with increased flecainide dosing. Currently AV paced so unable to adequately assess for VT , QT,  QRS prolongation.         Diltiazem 120 mg twice daily as well as Toprol 100 mg twice daily        Takes eliquis 5mg BID        Pt with history of atrial fibrillation and flutter ablation  Dual chamber Medtronic PPM  Chronic hyponatremia-sodium runs 128-134. Currently 128  QTc prolongation/torsades with sotalol    Plan  Interrogate PPM to assess for arrythmias that would correspond to her falls  Continue telemetry  Check orthostatic vital signs  Can not completely rule out flecainide side effect as part of patient presentation.  Will f/u with PPM interrogation and reach out to EP colleagues.  Agree with additional diuretic. Assess diuretic response and pts symptomatology with improved " "volume.     History of Present Illness   Physician Requesting Consult: Katie Bernardo MD  Reason for Consult / Principal Problem: chronic diastolic HF    Follows with Dr. Marie, Dr. Vazquez-EP  and EP at [facility]    HPI: Katiuska Che is a 78 y.o. year old female who presents with recurrent falls.  She was discharged from this hospital on Wednesday. She has been having increasing shortness of breath. Occasional lightheaded. She feel last evening. She does not recall being lightheaded. She said she was \"off balance\". Her daughter said she seemed more fatigued. She again fell and pushed her alert button. EMS arrived and she again fell face forward. Again said she felt off balance. No clear LOC. She presented hypoxic mid 80s on room air requiring nasal cannula.  Patient does report blurred vision but this was preceding the increased flecainide dosing.    She  was here earlier this week with recurrent paroxysmal atrial fibrillation/atrial tachycardia- symptomatic.   She was discharged on flecainide 150 mg every 12 hours, previously 100mg every 12 hours.  Also discharged on her usual AV sola blocker which was Cardizem 120 mg every 12 hours and Toprol 100 mg twice daily.  History of meningioma (follows at [facility]), MGUS, type 2 diabetes, thyroid nodule status post lobectomy, chronic hyponatremia, paroxysmal atrial fibrillation/flutter status post cryoablation with PVI 3/2021 with symptomatic bradycardia postprocedure and underwent dual-chamber PPM (then on flecainide at that time), subsequently 8/2022 at [facility] underwent repeat PVI as well as posterior wall isolation and CTI flutter ablation (he underwent short-term).  This was followed by cardioversion 8/2023 for recurrent atrial fibrillation.  History of QTc prolongation leading to torsades on sotalol.  Patient was hospitalized 1/2024 for atrial tachycardia after COVID booster.    Patient presented normotensive.  No tachycardia.  Initially 95% on room air but on recheck " 88 % r/a.  She received an albuterol nebulizer, 20 mg IV Lasix.    VBG-pH 7.407/pCO2 36.8/pO2 23/bicarb 23.2, sat 40.     Sodium 128.  Chronic hyponatremia runs 128-134.  Hospitalization earlier this week was 125.  Given a liter of Isolyte.    BUN/creatinine-34/0.99  0-hour troponin 11  2-hour troponin 11  4-hour troponin 11  H/H-11.4/34.0     TTE 12/2023- LVEF 55%. Diastolic function normal. Mild LAE. Mod MR. Mod TR. RVSP 53mmHg.       Inpatient consult to Cardiology  Consult performed by: ADOLPH Larose  Consult ordered by: Katie Bernardo MD          Review of Systems   Constitutional:  Positive for activity change and fatigue.   HENT: Negative.     Eyes:  Positive for visual disturbance.   Respiratory:  Positive for shortness of breath.    Cardiovascular:  Positive for palpitations. Negative for chest pain.   Gastrointestinal: Negative.    Endocrine: Negative.    Genitourinary: Negative.    Musculoskeletal:  Positive for gait problem.   Allergic/Immunologic: Negative.    Neurological:  Negative for syncope.   Hematological: Negative.    Psychiatric/Behavioral: Negative.     All other systems reviewed and are negative.      Historical Information   Past Medical History:   Diagnosis Date    Atrial fibrillation with RVR (HCC)     Basal cell carcinoma     Bronchiectasis without complication (HCC)     Chronic diastolic CHF (congestive heart failure) (HCC)     Fibromyalgia     GERD (gastroesophageal reflux disease)     Hyperlipidemia     Hypertension     Hyponatremia     Malignant neoplasm of thyroid gland (HCC)     Meningioma (HCC)     MGUS (monoclonal gammopathy of unknown significance)      Past Surgical History:   Procedure Laterality Date    CATARACT EXTRACTION Bilateral     HYSTERECTOMY      OOPHORECTOMY      THYROID LOBECTOMY Left      Social History     Substance and Sexual Activity   Alcohol Use None     Social History     Substance and Sexual Activity   Drug Use Not on file     Social History      Tobacco Use   Smoking Status Not on file   Smokeless Tobacco Not on file     Family History: No family history on file.    Meds/Allergies   current meds:   Current Facility-Administered Medications   Medication Dose Route Frequency    acetaminophen (TYLENOL) tablet 975 mg  975 mg Oral Q8H PRN    acetylcysteine (MUCOMYST) 200 mg/mL inhalation solution 400 mg  400 mg Nebulization Q4H PRN    albuterol (PROVENTIL HFA,VENTOLIN HFA) inhaler 2 puff  2 puff Inhalation Q6H PRN    apixaban (ELIQUIS) tablet 5 mg  5 mg Oral BID    ascorbic acid (VITAMIN C) tablet 500 mg  500 mg Oral Daily    diltiazem (CARDIZEM CD) 24 hr capsule 120 mg  120 mg Oral Daily    ezetimibe (ZETIA) tablet 10 mg  10 mg Oral HS    flecainide (TAMBOCOR) tablet 150 mg  150 mg Oral BID    gabapentin (NEURONTIN) capsule 300 mg  300 mg Oral HS    ipratropium (ATROVENT HFA) inhaler 2 puff  2 puff Inhalation Q6H    metoprolol succinate (TOPROL-XL) 24 hr tablet 100 mg  100 mg Oral BID    oxyCODONE (ROXICODONE) split tablet 2.5 mg  2.5 mg Oral Q4H PRN    rOPINIRole (REQUIP) tablet 2 mg  2 mg Oral HS    zolpidem (AMBIEN) tablet 10 mg  10 mg Oral HS PRN    and PTA meds:  Not in a hospital admission.  Not on File    Objective   Vitals: Blood pressure 141/66, pulse 60, temperature 98.3 °F (36.8 °C), temperature source Oral, resp. rate (!) 24, weight 77.8 kg (171 lb 8.3 oz), SpO2 93%.  Orthostatic Blood Pressures      Flowsheet Row Most Recent Value   Blood Pressure 141/66 filed at 05/24/2024 0930   Patient Position - Orthostatic VS Sitting filed at 05/24/2024 0930            No intake or output data in the 24 hours ending 05/24/24 1101    Invasive Devices       Peripheral Intravenous Line  Duration             Peripheral IV 05/24/24 Left;Proximal;Ventral (anterior) Forearm <1 day                    Physical Exam: /66 (BP Location: Right arm)   Pulse 60   Temp 98.3 °F (36.8 °C) (Oral)   Resp (!) 24   Wt 77.8 kg (171 lb 8.3 oz)   SpO2 93%   General  Appearance:    Alert, cooperative, no distress, appears stated age   Head:    Normocephalic, no scleral icterus   Eyes:    PERRL   Nose:   Nares normal, septum midline, mucosa normal, no drainage    Throat:   Lips, mucosa, and tongue normal   Neck:   Supple, symmetrical, trachea midline     no carotid bruit or JVD   Back:     Symmetric   Lungs:     Clear to auscultation bilaterally, respirations unlabored   Chest Wall:    No tenderness or deformity    Heart:    Regular rate and rhythm, S1 and S2 normal, no murmur, rub   or gallop   Abdomen:     Soft, non-tender, bowel sounds active all four quadrants,     no masses, no organomegaly   Extremities:   Extremities normal, atraumatic, no cyanosis or edema   Pulses:   2+ and symmetric all extremities   Skin:   Skin color, texture, turgor normal, no rashes or lesions   Neurologic:   Alert and oriented to person place and time. No focal deficits       Lab Results:   Recent Results (from the past 72 hour(s))   POCT Blood Gas (CG8+)    Collection Time: 05/24/24  4:00 AM   Result Value Ref Range    ph, Faizan ISTAT 7.407 (H) 7.300 - 7.400    pCO2, Faizan i-STAT 36.8 (L) 42.0 - 50.0 mm HG    pO2, Faizan i-STAT 23.0 (L) 35.0 - 45.0 mm HG    BE, i-STAT -1 -2 - 3 mmol/L    HCO3, Faizan i-STAT 23.2 (L) 24.0 - 30.0 mmol/L    CO2, i-STAT 24 21 - 32 mmol/L    O2 Sat, i-STAT 40 (L) 60 - 85 %    SODIUM, I-STAT 130 (L) 136 - 145 mmol/l    Potassium, i-STAT 4.1 3.5 - 5.3 mmol/L    Calcium, Ionized i-STAT 1.14 1.12 - 1.32 mmol/L    Hct, i-STAT 37 34.8 - 46.1 %    Hgb, i-STAT 12.6 11.5 - 15.4 g/dl    Glucose, i-STAT 170 (H) 65 - 140 mg/dl    Specimen Type VENOUS    ECG 12 lead    Collection Time: 05/24/24  4:18 AM   Result Value Ref Range    Ventricular Rate 61 BPM    Atrial Rate 61 BPM    CO Interval 232 ms    QRSD Interval 182 ms    QT Interval 546 ms    QTC Interval 549 ms    P Axis  degrees    QRS Axis -66 degrees    T Wave Axis 104 degrees   CBC and differential    Collection Time: 05/24/24  4:50  "AM   Result Value Ref Range    WBC 9.61 4.31 - 10.16 Thousand/uL    RBC 3.71 (L) 3.81 - 5.12 Million/uL    Hemoglobin 11.4 (L) 11.5 - 15.4 g/dL    Hematocrit 34.0 (L) 34.8 - 46.1 %    MCV 92 82 - 98 fL    MCH 30.7 26.8 - 34.3 pg    MCHC 33.5 31.4 - 37.4 g/dL    RDW 15.0 11.6 - 15.1 %    MPV 9.8 8.9 - 12.7 fL    Platelets 211 149 - 390 Thousands/uL    nRBC 0 /100 WBCs    Segmented % 72 43 - 75 %    Immature Grans % 0 0 - 2 %    Lymphocytes % 15 14 - 44 %    Monocytes % 11 4 - 12 %    Eosinophils Relative 1 0 - 6 %    Basophils Relative 1 0 - 1 %    Absolute Neutrophils 6.96 1.85 - 7.62 Thousands/µL    Absolute Immature Grans 0.04 0.00 - 0.20 Thousand/uL    Absolute Lymphocytes 1.46 0.60 - 4.47 Thousands/µL    Absolute Monocytes 1.02 0.17 - 1.22 Thousand/µL    Eosinophils Absolute 0.08 0.00 - 0.61 Thousand/µL    Basophils Absolute 0.05 0.00 - 0.10 Thousands/µL   Basic metabolic panel    Collection Time: 05/24/24  4:50 AM   Result Value Ref Range    Sodium 128 (L) 135 - 147 mmol/L    Potassium 4.1 3.5 - 5.3 mmol/L    Chloride 95 (L) 96 - 108 mmol/L    CO2 25 21 - 32 mmol/L    ANION GAP 8 4 - 13 mmol/L    BUN 34 (H) 5 - 25 mg/dL    Creatinine 0.99 0.60 - 1.30 mg/dL    Glucose 157 (H) 65 - 140 mg/dL    Calcium 9.1 8.4 - 10.2 mg/dL    eGFR 54 ml/min/1.73sq m   APTT    Collection Time: 05/24/24  4:50 AM   Result Value Ref Range    PTT 32 23 - 37 seconds   Protime-INR    Collection Time: 05/24/24  4:50 AM   Result Value Ref Range    Protime 20.0 (H) 11.6 - 14.5 seconds    INR 1.62 (H) 0.84 - 1.19   HS Troponin 0hr (reflex protocol)    Collection Time: 05/24/24  4:50 AM   Result Value Ref Range    hs TnI 0hr 11 \"Refer to ACS Flowchart\"- see link ng/L   HS Troponin I 2hr    Collection Time: 05/24/24  7:31 AM   Result Value Ref Range    hs TnI 2hr 11 \"Refer to ACS Flowchart\"- see link ng/L    Delta 2hr hsTnI 0 <20 ng/L   Osmolality-\"If this is regarding a toxic alcohol, STOP. Test is not routinely indicated. Please consult " "medical  on call for further guidance.\"    Collection Time: 05/24/24  7:31 AM   Result Value Ref Range    Osmolality Serum 284 282 - 298 mmol/KG   Osmolality, urine    Collection Time: 05/24/24  8:40 AM   Result Value Ref Range    Osmolality, Ur 729 250 - 900 mmol/KG   Sodium, urine, random    Collection Time: 05/24/24  8:40 AM   Result Value Ref Range    Sodium, Ur 38 Reference range not established.   HS Troponin I 4hr    Collection Time: 05/24/24  9:25 AM   Result Value Ref Range    hs TnI 4hr 11 \"Refer to ACS Flowchart\"- see link ng/L    Delta 4hr hsTnI 0 <20 ng/L     Left Ventricle Left ventricular cavity size is normal. The left ventricular ejection fraction is 55%. Systolic function is normal. Wall motion is normal. Diastolic function is abnormal.   Right Ventricle Right ventricular cavity size is normal. Systolic function is normal.   Left Atrium The atrium is dilated.   Mitral Valve There is moderate regurgitation.   Tricuspid Valve There is moderate regurgitation. The estimated right ventricular systolic pressure is 53.00 mmHg.   IVC/SVC The right atrial pressure is estimated at 8.0 mmHg.       Imaging: I have personally reviewed pertinent reports.    EKG: AV paced underlying NSR      Code Status: Level 1 - Full Code  Advance Directive and Living Will:      Power of :    POLST:      Counseling / Coordination of Care  Total floor / unit time spent today 60 minutes.  Greater than 50% of total time was spent with the patient and / or family counseling and / or coordination of care.    "

## 2024-05-24 NOTE — PHYSICAL THERAPY NOTE
Physical Therapy Cancellation Note       05/24/24 1112   Note Type   Note type Cancelled Session   Cancel Reasons Other   Additional Comments referral received for PT eval and tx. pt is pending results of lumbar x-ray and pt was admitted s/p 2 falls. will await imaging results and perform eval as medically appropriate and schedule allows.     Frank Casiano, PT

## 2024-05-24 NOTE — PHYSICAL THERAPY NOTE
PHYSICAL THERAPY EVALUATION NOTE    Patient Name: Katiuska Che  Today's Date: 5/24/2024  AGE:   78 y.o.  Mrn:   [id number]  ADMIT DX:  Fall, initial encounter [W19.XXXA]  Chronic diastolic CHF (congestive heart failure) (HCC) [I50.32]  Head injury due to trauma [S09.90XA]    Past Medical History:   Diagnosis Date    Atrial fibrillation with RVR (HCC)     Basal cell carcinoma     Bronchiectasis without complication (HCC)     Chronic diastolic CHF (congestive heart failure) (HCC)     Fibromyalgia     GERD (gastroesophageal reflux disease)     Hyperlipidemia     Hypertension     Hyponatremia     Malignant neoplasm of thyroid gland (HCC)     Meningioma (HCC)     MGUS (monoclonal gammopathy of unknown significance)      Length Of Stay: 0  PHYSICAL THERAPY EVALUATION :    05/24/24 1611   PT Last Visit   PT Visit Date 05/24/24   Pain Assessment   Pain Assessment Tool 0-10   Pain Score No Pain   Restrictions/Precautions   Other Precautions Chair Alarm;Bed Alarm;Telemetry;Fall Risk;O2   Home Living   Type of Home House   Home Layout One level;Other (Comment)  (2 MAMIE)   Additional Comments lives alone. family lives next door. ambulates w/o device. owns cane, medalert, and walker. independent w/ ADLs and IADLs. 3 falls in last 6 months. no history of supplemental oxygen use.   General   Additional Pertinent History (S)  2L oxygen via nasal cannula. resting pulse ox 94% and 71 BPM, active 86% and 85 BPM.   Family/Caregiver Present No   Cognition   Arousal/Participation Alert   Orientation Level Oriented to person;Other (Comment)  (pt was identified w/ full name, birth date)   Following Commands Follows one step commands with increased time or repetition   Subjective   Subjective pt seen supine in bed. agreed to PT eval. denied pain. reports feeling intermittently off balance.   RUE Assessment   RUE Assessment WFL   LUE Assessment   LUE  Assessment WFL   RLE Assessment   RLE Assessment WFL  (3+ to 4-/5)   LLE Assessment   LLE Assessment WFL  (3+ to 4-/5)   Vision-Basic Assessment   Current Vision Wears glasses all the time   Coordination   Sensation X  (light touch impaired bilateral feet)   Bed Mobility   Supine to Sit 5  Supervision   Additional items HOB elevated;Increased time required   Sit to Supine 5  Supervision   Additional items HOB elevated;Increased time required   Transfers   Sit to Stand 5  Supervision   Additional items Increased time required   Stand to Sit 5  Supervision   Additional items Increased time required;Verbal cues  (for body positioning)   Ambulation/Elevation   Gait pattern Foward flexed;Short stride;Excessively slow   Gait Assistance 5  Supervision  (w/o device (loss of balance x1 requiring minx1 to correct). modified independent w/ single point cane.)   Additional items Verbal cues  (for posture, full step length)   Assistive Device None;SPC   Distance 50 feet w/o device. seated rest break. 140 feet w/ single point cane.  (additional ambulation not possible due to fatigue)   Balance   Static Sitting Good   Dynamic Sitting Fair   Static Standing Fair +  (w/ cane)   Ambulatory Fair  (w/ cane)   Activity Tolerance   Activity Tolerance Patient limited by fatigue   Nurse Made Aware spoke to NSG   Assessment   Problem List Decreased strength;Decreased endurance;Impaired balance;Decreased mobility;Decreased safety awareness;Impaired sensation   Assessment Pt reported having 2 falls in which the last one was witnessed by her daughter where she fell face first onto her head. Dx: essential HTN, chronic diastolic CHF, hyponatremia, and ambulatory dysfunction. order placed for PT eval and tx, w/ activity order of up as tolerated. pt presents w/ comorbidities of a-fib, cardiac pacemaker, HTN, fibromyalgia, meningioma, CHF, cataract, and hyperlipidemia and personal factors of advanced age, stair(s) to enter home, and positive fall  history. pt presents w/ weakness, decreased endurance, impaired balance, gait deviations, altered sensation, decreased safety awareness, and fall risk. these impairments are evident in findings from physical examination (weakness and altered sensation), mobility assessment (need for standby assist w/ all phases of mobility when usually mobilizing independently, tolerance to only 140 feet of ambulation, and need for cueing for mobility technique), and Barthel Index: 75/100. pt needed input for mobility and breathing technique. pt is at risk for falls due to physical and safety awareness deficits. pt's clinical presentation is unstable/unpredictable (evident in need for assist w/ all phases of mobility when usually mobilizing independently, tolerance to only 140 feet of ambulation, need for supplemental oxygen in order to maintain oxygen saturation, declining oxygen concentration w/ low threshold of activity, and need for input for mobility technique/safety). pt needs inpatient PT tx to improve mobility deficits and progress mobility training as appropriate. Pt was noted to have improved gait stability and tolerance w/ use of single point cane over ambulating w/o a device.   Goals   Patient Goals I want to go home.   STG Expiration Date 06/03/24   Short Term Goal #1 pt will: Increase bilateral LE strength 1/2 grade to facilitate independent mobility, Perform bed mobility independently to increase level of independence, Perform all transfers independently to improve independence, Ambulate 300 ft. with least restrictive assistive device independently w/o LOB to improve functional independence, Navigate 2 stair(s) independently with unilateral handrail to facilitate return to previous living environment, Increase ambulatory balance 1 grade to decrease risk for falls, Complete exercise program independently to increase strength and endurance, Tolerate 3 hr OOB to faciliate upright tolerance, Improve Barthel Index score to  95 or greater to facilitate independence, and Complete Timed Up and Go or Comfortable Gait Speed to further assess mobility and monitor progress   PT Treatment Day 0   Plan   Treatment/Interventions Functional transfer training;LE strengthening/ROM;Elevations;Therapeutic exercise;Endurance training;Gait training;Bed mobility;Equipment eval/education;Patient/family training   PT Frequency 3-5x/wk   Discharge Recommendation   Rehab Resource Intensity Level, PT III (Minimum Resource Intensity)   AM-PAC Basic Mobility Inpatient   Turning in Flat Bed Without Bedrails 4   Lying on Back to Sitting on Edge of Flat Bed Without Bedrails 3   Moving Bed to Chair 3   Standing Up From Chair Using Arms 3   Walk in Room 3   Climb 3-5 Stairs With Railing 2   Basic Mobility Inpatient Raw Score 18   Basic Mobility Standardized Score 41.05   [facility] Highest Level Of Mobility   -HLM Goal 6: Walk 10 steps or more   -HLM Achieved 7: Walk 25 feet or more   Barthel Index   Feeding 10   Bathing 0   Grooming Score 5   Dressing Score 10   Bladder Score 10   Bowels Score 10   Toilet Use Score 10   Transfers (Bed/Chair) Score 10   Mobility (Level Surface) Score 10   Stairs Score 0   Barthel Index Score 75   End of Consult   Patient Position at End of Consult Supine;Bed/Chair alarm activated;All needs within reach     The patient's AM-PAC Basic Mobility Inpatient Short Form Raw Score is 18. A Raw score of greater than 16 suggests the patient may benefit from discharge to home. Please also refer to the recommendation of the Physical Therapist for safe discharge planning.    Skilled PT recommended while in hospital and upon DC to progress pt toward treatment goals.     Frank Casiano, PT

## 2024-05-24 NOTE — ASSESSMENT & PLAN NOTE
>>ASSESSMENT AND PLAN FOR HYPERLIPIDEMIA WRITTEN ON 5/24/2024  6:23 AM BY BARNEY DUMONT MD    Continue ezetimibe

## 2024-05-24 NOTE — H&P
[facility]  H&P  Name: Katiuska Che 78 y.o. female I MRN: [id number]  Unit/Bed#: ED-23 I Date of Admission: 5/24/2024   Date of Service: 5/24/2024 I Hospital Day: 0      Assessment & Plan   * Falls  Assessment & Plan  Assessment:  Patient reports having 2 falls today the one in which was observed by her daughter where she fell face flat  Denies syncope but endorses more frequent palpitations than her baseline  Feels that she is having more blurry vision as of lately, is seeing ophthalmologist outpatient. Has bilateral cataract extraction in past.  Complains of 2/10 lumbar spine pain with coughing, but 1/10 at rest  Sodium 128 but chronically low  Hgb 11.4  POCT BG showed pH 7.407, pCO2 36.8, pO2 23.0, HCO3 23.2, Sodium 130  Trauma workup was unremarkable: CT head demonstrates a hyperdensity in the left frontotemporal region.  After speaking with patient and chart review, this hyperdensity was present on a CT in 2022 and patient was diagnosed with a hemangioma at that time. CT C-spine negative for acute traumatic injuries. FAST exam negative      Plan:  Monitor telemetry  ECHO complete  Replete electrolytes  PT/OT consult  Pain control with tylenol for mild pain and oxycodoe for moderate pain  Lidocaine patch as needed    Essential hypertension  Assessment & Plan  Assessment:  Home Meds: dilitiazem, metoprolol, lasix  /73 on admission    Plan:  Continue home medications but will hold lasix for now  Outpatient follow-up with PCP    Chronic diastolic CHF (congestive heart failure) (HCC)  Assessment & Plan  Wt Readings from Last 3 Encounters:   05/24/24 77.8 kg (171 lb 8.3 oz)       Assessment:  Not currently in exacerbation  Appears euvolemic on exam  No complaints of SOB or CP    Plan:  Continue home medications        Hyperlipidemia  Assessment & Plan  Continue ezetimibe     Hyponatremia  Assessment & Plan  Assessment:  Patient with a history of chronic hyponatremia around  128-134  Currently Na 128      Plan:  Monitor BMP      Ambulatory dysfunction  Assessment & Plan  PT/OT consult       VTE Pharmacologic Prophylaxis: 4  Moderate Risk (Score 3-4) - Pharmacological DVT Prophylaxis Ordered: apixaban (Eliquis).  Code Status: Level 1 code  Discussion with family: Updated  (daughter) at bedside.    Anticipated Length of Stay: Patient will be admitted on an inpatient basis with an anticipated length of stay of greater than 2 midnights secondary to falls.    Chief Complaint: Falls    History of Present Illness:  Katiuska Che is a 78 y.o. female with a PMH of atrial fibrillation with RVR and biventricular pacemaker, essential hypertension, allergic rhinitis, fibromyalgia, hyperlipidemia who presents with recurrent falls.  Today the patient reported having 2 falls in which the last one was witnessed by her daughter where she fell face first onto her head.  She states that she feels overall well except that she has some 2 out of 10 pain in her lumbar spinal area. Trauma workup was unremarkable. She states that she has been having more difficulty with her vision as of late as her vision has become more blurry and she needs more time to refocus her vision. She has an appointment coming up with ophthalmologist in the upcoming weeks. She also states that she has been having more palpitations than usual. In the ED she has paced rhythm on admission but was apparently desaturating into the 80s on room air and they started 3 L of nasal cannula with improvement in her O2 status as well as breathing treatments. Currently she is saturating above 97 percent on room air. She denies nausea, vomiting, diarrhea, constipation, chest pain, shortness of breath. EKG showed AV dual paced rhythm with prolonged AV conduction. She was hemodynamically stable on admission.     Review of Systems:  Review of Systems   Constitutional:  Negative for chills, diaphoresis and fever.   HENT: Negative.     Eyes:  Negative.    Respiratory: Negative.  Negative for cough, chest tightness, shortness of breath, wheezing and stridor.    Cardiovascular:  Negative for chest pain, palpitations and leg swelling.   Gastrointestinal:  Negative for constipation, diarrhea, nausea and vomiting.   Endocrine: Negative.    Musculoskeletal:  Positive for back pain and gait problem. Negative for neck pain.   Skin: Negative.    Allergic/Immunologic: Negative.    Hematological: Negative.    Psychiatric/Behavioral: Negative.         Past Medical and Surgical History:   Past Medical History:   Diagnosis Date    Atrial fibrillation with RVR (HCC)     Basal cell carcinoma     Bronchiectasis without complication (HCC)     Chronic diastolic CHF (congestive heart failure) (HCC)     Fibromyalgia     GERD (gastroesophageal reflux disease)     Hyperlipidemia     Hypertension     Hyponatremia     Malignant neoplasm of thyroid gland (HCC)     Meningioma (HCC)     MGUS (monoclonal gammopathy of unknown significance)        Past Surgical History:   Procedure Laterality Date    CATARACT EXTRACTION Bilateral     HYSTERECTOMY      OOPHORECTOMY      THYROID LOBECTOMY Left        Meds/Allergies:  Prior to Admission medications    Not on File     I have reviewed home medications with patient personally.    Allergies: Not on File    Social History:  Marital Status: Single   Occupation: Retired  Patient Pre-hospital Living Situation: Home  Patient Pre-hospital Level of Mobility: walks  Patient Pre-hospital Diet Restrictions: None  Substance Use History:   Social History     Substance and Sexual Activity   Alcohol Use None     Social History     Tobacco Use   Smoking Status Not on file   Smokeless Tobacco Not on file     Social History     Substance and Sexual Activity   Drug Use Not on file       Family History:  No family history on file.    Physical Exam:     Vitals:   Blood Pressure: 142/65 (05/24/24 0515)  Pulse: 60 (05/24/24 0515)  Temperature: 98.3 °F (36.8 °C)  (05/24/24 0351)  Temp Source: Oral (05/24/24 0351)  Respirations: (!) 26 (05/24/24 0515)  Weight - Scale: 77.8 kg (171 lb 8.3 oz) (05/24/24 0351)  SpO2: 99 % (05/24/24 0515)    Physical Exam  Vitals reviewed.   Constitutional:       General: She is not in acute distress.     Appearance: She is normal weight. She is not ill-appearing or toxic-appearing.   HENT:      Head: Normocephalic.      Nose: No congestion.      Mouth/Throat:      Mouth: Mucous membranes are moist.      Pharynx: No oropharyngeal exudate.   Eyes:      General:         Right eye: No discharge.         Left eye: No discharge.      Extraocular Movements: Extraocular movements intact.   Cardiovascular:      Rate and Rhythm: Normal rate.      Pulses: Normal pulses.      Heart sounds: Normal heart sounds. No murmur heard.     No friction rub.      Comments: Paced rhythm  Pulmonary:      Effort: Pulmonary effort is normal. No respiratory distress.      Breath sounds: Normal breath sounds. No wheezing, rhonchi or rales.   Chest:      Chest wall: No tenderness.   Abdominal:      Palpations: Abdomen is soft.      Tenderness: There is no abdominal tenderness. There is no guarding or rebound.   Genitourinary:     General: Normal vulva.   Musculoskeletal:      Right lower leg: No edema.      Left lower leg: No edema.      Comments: 2 out of 10 point tenderness to lumbar spine   Skin:     General: Skin is warm.      Capillary Refill: Capillary refill takes less than 2 seconds.   Neurological:      Mental Status: She is alert and oriented to person, place, and time.   Psychiatric:         Mood and Affect: Mood normal.          Additional Data:     Lab Results:  Results from last 7 days   Lab Units 05/24/24  0450   WBC Thousand/uL 9.61   HEMOGLOBIN g/dL 11.4*   HEMATOCRIT % 34.0*   PLATELETS Thousands/uL 211   SEGS PCT % 72   LYMPHO PCT % 15   MONO PCT % 11   EOS PCT % 1     Results from last 7 days   Lab Units 05/24/24  0450   SODIUM mmol/L 128*   POTASSIUM  mmol/L 4.1   CHLORIDE mmol/L 95*   CO2 mmol/L 25   BUN mg/dL 34*   CREATININE mg/dL 0.99   ANION GAP mmol/L 8   CALCIUM mg/dL 9.1   GLUCOSE RANDOM mg/dL 157*     Results from last 7 days   Lab Units 05/24/24  0450   INR  1.62*                   Lines/Drains:  Invasive Devices       Peripheral Intravenous Line  Duration             Peripheral IV 05/24/24 Left;Proximal;Ventral (anterior) Forearm <1 day                        Imaging: Reviewed radiology reports from this admission including: chest xray, CT head, and CT cervical spine  TRAUMA - CT head wo contrast   Final Result by Deysi Larkin MD (05/24 0446)      Small rounded hyperdense foci in the left frontotemporal region likely represent calcifications. However, short interval follow-up CT in 6-8 hours is recommended.         I personally discussed this study with ZACHERY JIANG on 5/24/2024 4:42 AM.                     Workstation performed: [id number]         TRAUMA - CT spine cervical wo contrast   Final Result by Deysi Larkin MD (05/24 0440)      No cervical spine fracture or traumatic malalignment.      Incidental thyroid nodule(s) for which nonemergent thyroid ultrasound is recommended.      Interlobular septal thickening in the lung apices suspicious for interstitial edema. Partially visualized bilateral pleural effusions.            I personally discussed this study with ZACHERY JIANG on 5/24/2024 4:40 AM.                  Workstation performed: [id number]         XR Trauma multiple (SLB/SLRA trauma bay ONLY)   Final Result by Deysi Larkin MD (05/24 0590)      Diffuse interstitial thickening bilaterally suspicious for interstitial edema.      Small bilateral pleural effusions.               Workstation performed: [id number]         XR chest 1 view    (Results Pending)       EKG and Other Studies Reviewed on Admission:   EKG: AV dual paced rhythm with prolonged AV conduction.    ** Please Note: This note has been constructed using a voice  recognition system. **

## 2024-05-24 NOTE — ASSESSMENT & PLAN NOTE
Wt Readings from Last 3 Encounters:   05/24/24 77.8 kg (171 lb 8.3 oz)       Assessment:  Not currently in exacerbation  Appears euvolemic on exam  No complaints of SOB or CP    Plan:  Continue home medications  Pulmonology consult recommended per cardiology in setting of cough/dyspnea despite euvolemia   Patient received another dose of Lasix 20 mg IV 5/25/24  Flecainide reduced to 100 mg BID in setting of PAF

## 2024-05-24 NOTE — H&P
H&P - Trauma   Katiuska Che 78 y.o. female MRN: [id number]  Unit/Bed#: ED-23 Encounter: [id number]    Trauma Alert: Level B   Model of Arrival: Ambulance    Trauma Team: Attending Eugenia Rouse  Consultants:     None     Assessment & Plan   Active Problems / Assessment:   Multiple falls  Possible syncope  Hypoxia with new oxygen requirement     Plan:   CT head demonstrates a hyperdensity in the left frontotemporal region.  After speaking with patient and chart review, this hyperdensity was present on a CT in 2022 and patient was diagnosed with a hemangioma at that time.  CT C-spine negative for acute traumatic injuries  FAST exam negative  Patient hypoxic to the mid 80s on room air, requiring 3 L nasal cannula on admission  Home nebulizer treatment administered  Will admit to internal medicine team for syncopal workup and pulmonary management  Trauma will follow for a tertiary exam      Cervical Collar Clearance:    The patient had a CT scan of the cervical spine demonstrating no acute injury. On exam, the patient had no midline point tenderness or paresthesias/numbness/weakness in the extremities. The patient had full range of motion (was then able to flex, extend, and rotate head laterally) without pain. There were no distracting injuries and the patient was not intoxicated.      The patient's cervical spine was cleared radiologically and clinically. Cervical collar removed at this time.     Bridgett Rouse PA-C  5/24/2024 5:39 AM       History of Present Illness     Chief Complaint: No complaints  Mechanism:Fall     HPI:    Katiuska Che is a 78 y.o. female who presents status post multiple falls this morning prior to arrival.  Per EMS, patient had a fall at home this morning requiring EMS evaluation for lift assist.  While EMS was still there, patient had another episode of falling with head strike.  No loss of consciousness.  Patient denies presyncopal symptoms including dizziness and  lightheadedness, but in discussion with her daughter, the daughter reports that the patient was complaining of dizziness causing her to fall at the scene.  Patient's daughter reports a recent history of a couple of falls in which the patient seemed to lose her balance and fall forward unprovoked.  On arrival to the trauma bay, patient is alert and oriented, GCS 15.  She is not offering any complaints at this time.  She was noted to be hypoxic into the mid 80s on room air in the trauma bay requiring 3 L nasal cannula.    Review of Systems   Constitutional: Negative.    HENT: Negative.     Eyes: Negative.    Respiratory: Negative.     Cardiovascular: Negative.    Gastrointestinal: Negative.    Genitourinary: Negative.    Musculoskeletal: Negative.    Skin: Negative.    Neurological: Negative.    Psychiatric/Behavioral: Negative.       12-point, complete review of systems was reviewed and negative except as stated above.     Historical Information     Past Medical History:   Diagnosis Date    Atrial fibrillation with RVR (HCC)     Basal cell carcinoma     Bronchiectasis without complication (HCC)     Chronic diastolic CHF (congestive heart failure) (HCC)     Fibromyalgia     GERD (gastroesophageal reflux disease)     Hyperlipidemia     Hypertension     Hyponatremia     Malignant neoplasm of thyroid gland (HCC)     Meningioma (HCC)     MGUS (monoclonal gammopathy of unknown significance)      Past Surgical History:   Procedure Laterality Date    CATARACT EXTRACTION Bilateral     HYSTERECTOMY      OOPHORECTOMY      THYROID LOBECTOMY Left              There is no immunization history on file for this patient.  Last Tetanus: Unknown  Family History: Non-contributory    1. Before the illness or injury that brought you to the Emergency, did you need someone to help you on a regular basis? 1=Yes   2. Since the illness or injury that brought you to the Emergency, have you needed more help than usual to take care of yourself?  1=Yes   3. Have you been hospitalized for one or more nights during the past 6 months (excluding a stay in the Emergency Department)? 1=Yes   4. In general, do you see well? 0=Yes   5. In general, do you have serious problems with your memory? 0=No   6. Do you take more than three different medications everyday? 1=Yes   TOTAL   4     Did you order a geriatric consult if the score was 2 or greater?: Will defer to primary team     Meds/Allergies   all current active meds have been reviewed  Allergies have not been reviewed;  Not on File    Objective   Initial Vitals:   Temperature: 98.3 °F (36.8 °C) (05/24/24 0351)  Pulse: 85 (05/24/24 0351)  Respirations: 18 (05/24/24 0351)  Blood Pressure: 151/73 (05/24/24 0351)    Primary Survey:   Airway:        Status: patent;        Pre-hospital Interventions: none        Hospital Interventions: none  Breathing: Hospital Interventions: Placed on 3L NC in trauma bay for O2 saturation of 85%       Pre-hospital Interventions: none       Effort: normal       Right breath sounds: normal       Left breath sounds: normal  Circulation:        Rhythm: regular       Rate: regular   Right Pulses Left Pulses    R radial: 2+  R femoral: 2+  R pedal: 2+     L radial: 2+  L femoral: 2+  L pedal: 2+       Disability:        GCS: Eye: 4; Verbal: 5 Motor: 6 Total: 15       Right Pupil: 4 mm;  round;  reactive         Left Pupil:  4 mm;  round;  reactive      R Motor Strength L Motor Strength    R : 5/5  R dorsiflex: 5/5  R plantarflex: 5/5 L : 5/5  L dorsiflex: 5/5  L plantarflex: 5/5        Sensory:  No sensory deficit  Exposure:       Completed: Yes      Secondary Survey:  Physical Exam  Constitutional:       General: She is not in acute distress.     Interventions: Cervical collar in place.   HENT:      Head: Normocephalic.      Right Ear: External ear normal.      Left Ear: External ear normal.      Nose: Nose normal.      Mouth/Throat:      Mouth: Mucous membranes are moist.   Eyes:       Extraocular Movements: Extraocular movements intact.      Pupils: Pupils are equal, round, and reactive to light.   Cardiovascular:      Rate and Rhythm: Normal rate and regular rhythm.      Pulses: Normal pulses.      Heart sounds: Normal heart sounds.   Pulmonary:      Effort: Accessory muscle usage present. No respiratory distress.      Breath sounds: Normal breath sounds.   Chest:      Chest wall: No tenderness.   Abdominal:      General: Abdomen is flat. There is no distension.      Palpations: Abdomen is soft.      Tenderness: There is no abdominal tenderness. There is no guarding.   Musculoskeletal:         General: No tenderness. Normal range of motion.      Cervical back: No deformity or tenderness.      Thoracic back: No deformity or tenderness.      Lumbar back: No deformity or tenderness.   Skin:     General: Skin is warm and dry.      Capillary Refill: Capillary refill takes less than 2 seconds.   Neurological:      General: No focal deficit present.      Mental Status: She is alert and oriented to person, place, and time.      Sensory: No sensory deficit.      Motor: No weakness.   Psychiatric:         Mood and Affect: Mood normal.         Behavior: Behavior normal.         Invasive Devices       Peripheral Intravenous Line  Duration             Peripheral IV 05/24/24 Left;Proximal;Ventral (anterior) Forearm <1 day                  Lab Results: I have personally reviewed all pertinent laboratory/test results 05/24/24 and in the preceding 24 hours.  Recent Labs     05/24/24  0400 05/24/24  0450   WBC  --  9.61   HGB 12.6 11.4*   HCT 37 34.0*   PLT  --  211   SODIUM  --  128*   K  --  4.1   CL  --  95*   CO2 24 25   BUN  --  34*   CREATININE  --  0.99   GLUC  --  157*   CAIONIZED 1.14  --    PTT  --  32   INR  --  1.62*   HSTNI0  --  11       Imaging Results: I have personally reviewed pertinent images saved in PACS. CT scan findings (and other pertinent positive findings on images) were discussed  with radiology. My interpretation of the images/reports are as follows:  Chest Xray(s): Diffuse interstitial thickening bilaterally suspicious for interstitial edema.  Small bilateral pleural effusions.   FAST exam(s): negative for acute findings   CT Scan(s): negative for acute findings   Additional Xray(s): N/A     Other Studies: None    Code Status: No Order  Advance Directive and Living Will:      Power of :    POLST:

## 2024-05-24 NOTE — PROGRESS NOTES
Chart review shows patient now has two chart and is currently at [facility] for a fall. Will monitor for discharge

## 2024-05-24 NOTE — ASSESSMENT & PLAN NOTE
Wt Readings from Last 3 Encounters:   05/24/24 77.8 kg (171 lb 8.3 oz)       Assessment:  Not currently in exacerbation  Appears euvolemic on exam    Plan:  Continue home medications

## 2024-05-24 NOTE — CASE MANAGEMENT
Case Management Assessment    Patient name Katiuska Che  Location ED-23/ED-23 MRN [id number]  : 1945 Date 2024       Current Admission Date: 2024  Current Admission Diagnosis:Falls   Patient Active Problem List    Diagnosis Date Noted Date Diagnosed    Falls 2024     Ambulatory dysfunction 2024     Hyponatremia 2024     Hyperlipidemia 2024     Chronic diastolic CHF (congestive heart failure) (HCC) 2024     Essential hypertension 2024       LOS (days): 0  Geometric Mean LOS (GMLOS) (days): 3.1  Days to GMLOS:2.9     OBJECTIVE:    Risk of Unplanned Readmission Score: 14.82         Current admission status: Inpatient       Preferred Pharmacy: No Pharmacies Listed  Primary Care Provider: Jessee Roldan MD    Primary Insurance: MEDICARE  Secondary Insurance: [facility]    ASSESSMENT:  Active Health Care Proxies    There are no active Health Care Proxies on file.                 Readmission Root Cause  30 Day Readmission: No    Patient Information  Admitted from:: Home  Mental Status: Alert  During Assessment patient was accompanied by: Not accompanied during assessment  Assessment information provided by:: Patient  Support Systems: Spouse/significant other, Family members  Home entry access options. Select all that apply.: Stairs  Number of steps to enter home.: 2  Do the steps have railings?: Yes  Type of Current Residence: [facility]  Living Arrangements: Lives w/ Spouse/significant other    Activities of Daily Living Prior to Admission  Functional Status: Independent  Completes ADLs independently?: Yes  Ambulates independently?: Yes  Does patient use assisted devices?: No  Does patient currently own DME?: Yes  What DME does the patient currently own?: Walker, Straight Cane, Nebulizer  Does patient have a history of Outpatient Therapy (PT/OT)?: Yes  Does the patient have a history of Short-Term Rehab?: No  Does patient have a history of HHC?: No  Does patient currently  have [facility]?: No         Patient Information Continued  Income Source: Pension/group home  Does patient have prescription coverage?: Yes  Does patient receive dialysis treatments?: No  Does patient have a history of substance abuse?: No  Does patient have a history of Mental Health Diagnosis?: No         Means of Transportation  Means of Transport to Appts:: Drives Self      Social Determinants of Health (SDOH)      Flowsheet Row Most Recent Value   Housing Stability    In the last 12 months, was there a time when you were not able to pay the mortgage or rent on time? N   At any time in the past 12 months, were you homeless or living in a shelter (including now)? N   Transportation Needs    In the past 12 months, has lack of transportation kept you from medical appointments or from getting medications? no   In the past 12 months, has lack of transportation kept you from meetings, work, or from getting things needed for daily living? No   Food Insecurity    Within the past 12 months, you worried that your food would run out before you got the money to buy more. Never true   Within the past 12 months, the food you bought just didn't last and you didn't have money to get more. Never true   Utilities    In the past 12 months has the electric, gas, oil, or water company threatened to shut off services in your home? No

## 2024-05-24 NOTE — PLAN OF CARE
Problem: PHYSICAL THERAPY ADULT  Goal: Performs mobility at highest level of function for planned discharge setting.  See evaluation for individualized goals.  Description: Treatment/Interventions: Functional transfer training, LE strengthening/ROM, Elevations, Therapeutic exercise, Endurance training, Gait training, Bed mobility, Equipment eval/education, Patient/family training          See flowsheet documentation for full assessment, interventions and recommendations.  Note:    Problem List: Decreased strength, Decreased endurance, Impaired balance, Decreased mobility, Decreased safety awareness, Impaired sensation  Assessment: Pt reported having 2 falls in which the last one was witnessed by her daughter where she fell face first onto her head. Dx: essential HTN, chronic diastolic CHF, hyponatremia, and ambulatory dysfunction. order placed for PT eval and tx, w/ activity order of up as tolerated. pt presents w/ comorbidities of a-fib, cardiac pacemaker, HTN, fibromyalgia, meningioma, CHF, cataract, and hyperlipidemia and personal factors of advanced age, stair(s) to enter home, and positive fall history. pt presents w/ weakness, decreased endurance, impaired balance, gait deviations, altered sensation, decreased safety awareness, and fall risk. these impairments are evident in findings from physical examination (weakness and altered sensation), mobility assessment (need for standby assist w/ all phases of mobility when usually mobilizing independently, tolerance to only 140 feet of ambulation, and need for cueing for mobility technique), and Barthel Index: 75/100. pt needed input for mobility and breathing technique. pt is at risk for falls due to physical and safety awareness deficits. pt's clinical presentation is unstable/unpredictable (evident in need for assist w/ all phases of mobility when usually mobilizing independently, tolerance to only 140 feet of ambulation, need for supplemental oxygen in order  to maintain oxygen saturation, declining oxygen concentration w/ low threshold of activity, and need for input for mobility technique/safety). pt needs inpatient PT tx to improve mobility deficits and progress mobility training as appropriate. Pt was noted to have improved gait stability and tolerance w/ use of single point cane over ambulating w/o a device.        Rehab Resource Intensity Level, PT: III (Minimum Resource Intensity)    See flowsheet documentation for full assessment.

## 2024-05-24 NOTE — PLAN OF CARE
Problem: Nutrition/Hydration-ADULT  Goal: Nutrient/Hydration intake appropriate for improving, restoring or maintaining nutritional needs  Description: Monitor and assess patient's nutrition/hydration status for malnutrition. Collaborate with interdisciplinary team and initiate plan and interventions as ordered.  Monitor patient's weight and dietary intake as ordered or per policy. Utilize nutrition screening tool and intervene as necessary. Determine patient's food preferences and provide high-protein, high-caloric foods as appropriate.     INTERVENTIONS:  - Monitor oral intake, urinary output, labs, and treatment plans  - Assess nutrition and hydration status and recommend course of action  - Evaluate amount of meals eaten  - Assist patient with eating if necessary   - Allow adequate time for meals  - Recommend/ encourage appropriate diets, oral nutritional supplements, and vitamin/mineral supplements  - Order, calculate, and assess calorie counts as needed  - Recommend, monitor, and adjust tube feedings and TPN/PPN based on assessed needs  - Assess need for intravenous fluids  - Provide specific nutrition/hydration education as appropriate  - Include patient/family/caregiver in decisions related to nutrition  Outcome: Progressing     Problem: PAIN - ADULT  Goal: Verbalizes/displays adequate comfort level or baseline comfort level  Description: Interventions:  - Encourage patient to monitor pain and request assistance  - Assess pain using appropriate pain scale  - Administer analgesics based on type and severity of pain and evaluate response  - Implement non-pharmacological measures as appropriate and evaluate response  - Consider cultural and social influences on pain and pain management  - Notify physician/advanced practitioner if interventions unsuccessful or patient reports new pain  Outcome: Progressing     Problem: INFECTION - ADULT  Goal: Absence or prevention of progression during  hospitalization  Description: INTERVENTIONS:  - Assess and monitor for signs and symptoms of infection  - Monitor lab/diagnostic results  - Monitor all insertion sites, i.e. indwelling lines, tubes, and drains  - Monitor endotracheal if appropriate and nasal secretions for changes in amount and color  - [place] appropriate cooling/warming therapies per order  - Administer medications as ordered  - Instruct and encourage patient and family to use good hand hygiene technique  - Identify and instruct in appropriate isolation precautions for identified infection/condition  Outcome: Progressing  Goal: Absence of fever/infection during neutropenic period  Description: INTERVENTIONS:  - Monitor WBC    Outcome: Progressing     Problem: SAFETY ADULT  Goal: Patient will remain free of falls  Description: INTERVENTIONS:  - Educate patient/family on patient safety including physical limitations  - Instruct patient to call for assistance with activity   - Consult OT/PT to assist with strengthening/mobility   - Keep Call bell within reach  - Keep bed low and locked with side rails adjusted as appropriate  - Keep care items and personal belongings within reach  - Initiate and maintain comfort rounds  - Make Fall Risk Sign visible to staff  - Offer Toileting every  Hours, in advance of need  - Initiate/Maintain alarm  - Obtain necessary fall risk management equipment:   - Apply yellow socks and bracelet for high fall risk patients  - Consider moving patient to room near nurses station  Outcome: Progressing  Goal: Maintain or return to baseline ADL function  Description: INTERVENTIONS:  -  Assess patient's ability to carry out ADLs; assess patient's baseline for ADL function and identify physical deficits which impact ability to perform ADLs (bathing, care of mouth/teeth, toileting, grooming, dressing, etc.)  - Assess/evaluate cause of self-care deficits   - Assess range of motion  - Assess patient's mobility; develop plan if  impaired  - Assess patient's need for assistive devices and provide as appropriate  - Encourage maximum independence but intervene and supervise when necessary  - Involve family in performance of ADLs  - Assess for home care needs following discharge   - Consider OT consult to assist with ADL evaluation and planning for discharge  - Provide patient education as appropriate  Outcome: Progressing  Goal: Maintains/Returns to pre admission functional level  Description: INTERVENTIONS:  - Perform AM-PAC 6 Click Basic Mobility/ Daily Activity assessment daily.  - Set and communicate daily mobility goal to care team and patient/family/caregiver.   - Collaborate with rehabilitation services on mobility goals if consulted  - Perform Range of Motion  times a day.  - Reposition patient every  hours.  - Dangle patient  times a day  - Stand patient  times a day  - Ambulate patient  times a day  - Out of bed to chair  times a day   - Out of bed for meals times a day  - Out of bed for toileting  - Record patient progress and toleration of activity level   Outcome: Progressing     Problem: DISCHARGE PLANNING  Goal: Discharge to home or other facility with appropriate resources  Description: INTERVENTIONS:  - Identify barriers to discharge w/patient and caregiver  - Arrange for needed discharge resources and transportation as appropriate  - Identify discharge learning needs (meds, wound care, etc.)  - Arrange for interpretive services to assist at discharge as needed  - Refer to Case Management Department for coordinating discharge planning if the patient needs post-hospital services based on physician/advanced practitioner order or complex needs related to functional status, cognitive ability, or social support system  Outcome: Progressing     Problem: Knowledge Deficit  Goal: Patient/family/caregiver demonstrates understanding of disease process, treatment plan, medications, and discharge instructions  Description: Complete  learning assessment and assess knowledge base.  Interventions:  - Provide teaching at level of understanding  - Provide teaching via preferred learning methods  Outcome: Progressing

## 2024-05-24 NOTE — ED NOTES
Marissa HOLLAND from trauma at bedside talking with pt regarding results and removed C-collar. Marissa notified pt is drowsy and pulse ox dropped to 88% on 2 L NC. Pt now 98% on 3 L NC with no distress noted.      Tasia Zhu RN  05/24/24 [id number]

## 2024-05-25 ENCOUNTER — APPOINTMENT (INPATIENT)
Dept: NON INVASIVE DIAGNOSTICS | Facility: HOSPITAL | Age: 79
DRG: 091 | End: 2024-05-25
Payer: MEDICARE

## 2024-05-25 LAB
ANION GAP SERPL CALCULATED.3IONS-SCNC: 7 MMOL/L (ref 4–13)
ANION GAP SERPL CALCULATED.3IONS-SCNC: 7 MMOL/L (ref 4–13)
AORTIC ROOT: 2.6 CM
AORTIC ROOT: 2.6 CM
AORTIC VALVE MEAN VELOCITY: 11.6 M/S
AORTIC VALVE MEAN VELOCITY: 11.6 M/S
APICAL FOUR CHAMBER EJECTION FRACTION: 57 %
APICAL FOUR CHAMBER EJECTION FRACTION: 57 %
ASCENDING AORTA: 2.5 CM
ASCENDING AORTA: 2.5 CM
AV AREA BY CONTINUOUS VTI: 2.5 CM2
AV AREA BY CONTINUOUS VTI: 2.5 CM2
AV AREA PEAK VELOCITY: 2.5 CM2
AV AREA PEAK VELOCITY: 2.5 CM2
AV LVOT MEAN GRADIENT: 3 MMHG
AV LVOT MEAN GRADIENT: 3 MMHG
AV LVOT PEAK GRADIENT: 7 MMHG
AV LVOT PEAK GRADIENT: 7 MMHG
AV MEAN GRADIENT: 6 MMHG
AV MEAN GRADIENT: 6 MMHG
AV PEAK GRADIENT: 11 MMHG
AV PEAK GRADIENT: 11 MMHG
AV VALVE AREA: 2.45 CM2
AV VALVE AREA: 2.45 CM2
AV VELOCITY RATIO: 0.79
AV VELOCITY RATIO: 0.79
BSA FOR ECHO PROCEDURE: 1.74 M2
BSA FOR ECHO PROCEDURE: 1.74 M2
BUN SERPL-MCNC: 29 MG/DL (ref 5–25)
BUN SERPL-MCNC: 29 MG/DL (ref 5–25)
CALCIUM SERPL-MCNC: 8.7 MG/DL (ref 8.4–10.2)
CALCIUM SERPL-MCNC: 8.7 MG/DL (ref 8.4–10.2)
CHLORIDE SERPL-SCNC: 97 MMOL/L (ref 96–108)
CHLORIDE SERPL-SCNC: 97 MMOL/L (ref 96–108)
CO2 SERPL-SCNC: 26 MMOL/L (ref 21–32)
CO2 SERPL-SCNC: 26 MMOL/L (ref 21–32)
CREAT SERPL-MCNC: 0.96 MG/DL (ref 0.6–1.3)
CREAT SERPL-MCNC: 0.96 MG/DL (ref 0.6–1.3)
DOP CALC AO PEAK VEL: 1.65 M/S
DOP CALC AO PEAK VEL: 1.65 M/S
DOP CALC AO VTI: 32.59 CM
DOP CALC AO VTI: 32.59 CM
DOP CALC LVOT AREA: 3.14 CM2
DOP CALC LVOT AREA: 3.14 CM2
DOP CALC LVOT CARDIAC INDEX: 5.35 L/MIN/M2
DOP CALC LVOT CARDIAC INDEX: 5.35 L/MIN/M2
DOP CALC LVOT CARDIAC OUTPUT: 9.31 L/MIN
DOP CALC LVOT CARDIAC OUTPUT: 9.31 L/MIN
DOP CALC LVOT DIAMETER: 2 CM
DOP CALC LVOT DIAMETER: 2 CM
DOP CALC LVOT PEAK VEL VTI: 25.45 CM
DOP CALC LVOT PEAK VEL VTI: 25.45 CM
DOP CALC LVOT PEAK VEL: 1.3 M/S
DOP CALC LVOT PEAK VEL: 1.3 M/S
DOP CALC LVOT STROKE INDEX: 46 ML/M2
DOP CALC LVOT STROKE INDEX: 46 ML/M2
DOP CALC LVOT STROKE VOLUME: 79.91
DOP CALC LVOT STROKE VOLUME: 79.91
E WAVE DECELERATION TIME: 137 MS
E WAVE DECELERATION TIME: 137 MS
E/A RATIO: 2.3
E/A RATIO: 2.3
ERYTHROCYTE [DISTWIDTH] IN BLOOD BY AUTOMATED COUNT: 15.3 % (ref 11.6–15.1)
ERYTHROCYTE [DISTWIDTH] IN BLOOD BY AUTOMATED COUNT: 15.3 % (ref 11.6–15.1)
FRACTIONAL SHORTENING: 30 (ref 28–44)
FRACTIONAL SHORTENING: 30 (ref 28–44)
GFR SERPL CREATININE-BSD FRML MDRD: 56 ML/MIN/1.73SQ M
GFR SERPL CREATININE-BSD FRML MDRD: 56 ML/MIN/1.73SQ M
GLUCOSE SERPL-MCNC: 142 MG/DL (ref 65–140)
GLUCOSE SERPL-MCNC: 142 MG/DL (ref 65–140)
HCT VFR BLD AUTO: 31.5 % (ref 34.8–46.1)
HCT VFR BLD AUTO: 31.5 % (ref 34.8–46.1)
HGB BLD-MCNC: 10.5 G/DL (ref 11.5–15.4)
HGB BLD-MCNC: 10.5 G/DL (ref 11.5–15.4)
INTERVENTRICULAR SEPTUM IN DIASTOLE (PARASTERNAL SHORT AXIS VIEW): 1 CM
INTERVENTRICULAR SEPTUM IN DIASTOLE (PARASTERNAL SHORT AXIS VIEW): 1 CM
INTERVENTRICULAR SEPTUM: 1 CM (ref 0.6–1.1)
INTERVENTRICULAR SEPTUM: 1 CM (ref 0.6–1.1)
LAAS-AP2: 16.3 CM2
LAAS-AP2: 16.3 CM2
LAAS-AP4: 27.6 CM2
LAAS-AP4: 27.6 CM2
LEFT ATRIUM SIZE: 4.8 CM
LEFT ATRIUM SIZE: 4.8 CM
LEFT ATRIUM VOLUME (MOD BIPLANE): 67 ML
LEFT ATRIUM VOLUME (MOD BIPLANE): 67 ML
LEFT ATRIUM VOLUME INDEX (MOD BIPLANE): 38.5 ML/M2
LEFT ATRIUM VOLUME INDEX (MOD BIPLANE): 38.5 ML/M2
LEFT INTERNAL DIMENSION IN SYSTOLE: 4 CM (ref 2.1–4)
LEFT INTERNAL DIMENSION IN SYSTOLE: 4 CM (ref 2.1–4)
LEFT VENTRICULAR INTERNAL DIMENSION IN DIASTOLE: 5.7 CM (ref 3.5–6)
LEFT VENTRICULAR INTERNAL DIMENSION IN DIASTOLE: 5.7 CM (ref 3.5–6)
LEFT VENTRICULAR POSTERIOR WALL IN END DIASTOLE: 1.2 CM
LEFT VENTRICULAR POSTERIOR WALL IN END DIASTOLE: 1.2 CM
LEFT VENTRICULAR STROKE VOLUME: 88 ML
LEFT VENTRICULAR STROKE VOLUME: 88 ML
LVSV (TEICH): 88 ML
LVSV (TEICH): 88 ML
MCH RBC QN AUTO: 30.7 PG (ref 26.8–34.3)
MCH RBC QN AUTO: 30.7 PG (ref 26.8–34.3)
MCHC RBC AUTO-ENTMCNC: 33.3 G/DL (ref 31.4–37.4)
MCHC RBC AUTO-ENTMCNC: 33.3 G/DL (ref 31.4–37.4)
MCV RBC AUTO: 92 FL (ref 82–98)
MCV RBC AUTO: 92 FL (ref 82–98)
MV PEAK A VEL: 0.4 M/S
MV PEAK A VEL: 0.4 M/S
MV PEAK E VEL: 92 CM/S
MV PEAK E VEL: 92 CM/S
MV STENOSIS PRESSURE HALF TIME: 40 MS
MV STENOSIS PRESSURE HALF TIME: 40 MS
MV VALVE AREA P 1/2 METHOD: 5.5
MV VALVE AREA P 1/2 METHOD: 5.5
PLATELET # BLD AUTO: 202 THOUSANDS/UL (ref 149–390)
PLATELET # BLD AUTO: 202 THOUSANDS/UL (ref 149–390)
PMV BLD AUTO: 10.1 FL (ref 8.9–12.7)
PMV BLD AUTO: 10.1 FL (ref 8.9–12.7)
POTASSIUM SERPL-SCNC: 4 MMOL/L (ref 3.5–5.3)
POTASSIUM SERPL-SCNC: 4 MMOL/L (ref 3.5–5.3)
RA PRESSURE ESTIMATED: 10 MMHG
RA PRESSURE ESTIMATED: 10 MMHG
RBC # BLD AUTO: 3.42 MILLION/UL (ref 3.81–5.12)
RBC # BLD AUTO: 3.42 MILLION/UL (ref 3.81–5.12)
RIGHT ATRIUM AREA SYSTOLE A4C: 16.1 CM2
RIGHT ATRIUM AREA SYSTOLE A4C: 16.1 CM2
RIGHT VENTRICLE ID DIMENSION: 3.9 CM
RIGHT VENTRICLE ID DIMENSION: 3.9 CM
RV PSP: 66 MMHG
RV PSP: 66 MMHG
SL CV LEFT ATRIUM LENGTH A2C: 5.2 CM
SL CV LEFT ATRIUM LENGTH A2C: 5.2 CM
SL CV LV EF: 55
SL CV LV EF: 55
SL CV PED ECHO LEFT VENTRICLE DIASTOLIC VOLUME (MOD BIPLANE) 2D: 160 ML
SL CV PED ECHO LEFT VENTRICLE DIASTOLIC VOLUME (MOD BIPLANE) 2D: 160 ML
SL CV PED ECHO LEFT VENTRICLE SYSTOLIC VOLUME (MOD BIPLANE) 2D: 72 ML
SL CV PED ECHO LEFT VENTRICLE SYSTOLIC VOLUME (MOD BIPLANE) 2D: 72 ML
SODIUM SERPL-SCNC: 130 MMOL/L (ref 135–147)
SODIUM SERPL-SCNC: 130 MMOL/L (ref 135–147)
TR MAX PG: 56 MMHG
TR MAX PG: 56 MMHG
TR PEAK VELOCITY: 3.8 M/S
TR PEAK VELOCITY: 3.8 M/S
TRICUSPID ANNULAR PLANE SYSTOLIC EXCURSION: 2 CM
TRICUSPID ANNULAR PLANE SYSTOLIC EXCURSION: 2 CM
TRICUSPID VALVE PEAK REGURGITATION VELOCITY: 3.76 M/S
TRICUSPID VALVE PEAK REGURGITATION VELOCITY: 3.76 M/S
WBC # BLD AUTO: 8.3 THOUSAND/UL (ref 4.31–10.16)
WBC # BLD AUTO: 8.3 THOUSAND/UL (ref 4.31–10.16)

## 2024-05-25 PROCEDURE — 99232 SBSQ HOSP IP/OBS MODERATE 35: CPT | Performed by: INTERNAL MEDICINE

## 2024-05-25 PROCEDURE — 93306 TTE W/DOPPLER COMPLETE: CPT | Performed by: INTERNAL MEDICINE

## 2024-05-25 PROCEDURE — 80048 BASIC METABOLIC PNL TOTAL CA: CPT | Performed by: INTERNAL MEDICINE

## 2024-05-25 PROCEDURE — 85027 COMPLETE CBC AUTOMATED: CPT | Performed by: INTERNAL MEDICINE

## 2024-05-25 PROCEDURE — 99232 SBSQ HOSP IP/OBS MODERATE 35: CPT | Performed by: SURGERY

## 2024-05-25 PROCEDURE — 99223 1ST HOSP IP/OBS HIGH 75: CPT | Performed by: INTERNAL MEDICINE

## 2024-05-25 PROCEDURE — 93306 TTE W/DOPPLER COMPLETE: CPT

## 2024-05-25 RX ORDER — BENZONATATE 100 MG/1
100 CAPSULE ORAL 3 TIMES DAILY PRN
Status: DISCONTINUED | OUTPATIENT
Start: 2024-05-25 | End: 2024-05-27 | Stop reason: HOSPADM

## 2024-05-25 RX ORDER — FUROSEMIDE 10 MG/ML
20 INJECTION INTRAMUSCULAR; INTRAVENOUS ONCE
Status: COMPLETED | OUTPATIENT
Start: 2024-05-25 | End: 2024-05-25

## 2024-05-25 RX ORDER — PREDNISONE 20 MG/1
40 TABLET ORAL DAILY
Status: DISCONTINUED | OUTPATIENT
Start: 2024-05-25 | End: 2024-05-27

## 2024-05-25 RX ADMIN — APIXABAN 5 MG: 5 TABLET, FILM COATED ORAL at 21:20

## 2024-05-25 RX ADMIN — DILTIAZEM HYDROCHLORIDE 120 MG: 120 CAPSULE, COATED, EXTENDED RELEASE ORAL at 09:13

## 2024-05-25 RX ADMIN — IPRATROPIUM BROMIDE 2 PUFF: 17 AEROSOL, METERED RESPIRATORY (INHALATION) at 12:38

## 2024-05-25 RX ADMIN — PREDNISONE 40 MG: 20 TABLET ORAL at 14:13

## 2024-05-25 RX ADMIN — APIXABAN 5 MG: 5 TABLET, FILM COATED ORAL at 09:13

## 2024-05-25 RX ADMIN — METOPROLOL SUCCINATE 100 MG: 100 TABLET, EXTENDED RELEASE ORAL at 21:20

## 2024-05-25 RX ADMIN — METOPROLOL SUCCINATE 100 MG: 100 TABLET, EXTENDED RELEASE ORAL at 09:13

## 2024-05-25 RX ADMIN — IPRATROPIUM BROMIDE 2 PUFF: 17 AEROSOL, METERED RESPIRATORY (INHALATION) at 17:46

## 2024-05-25 RX ADMIN — BENZONATATE 100 MG: 100 CAPSULE ORAL at 12:38

## 2024-05-25 RX ADMIN — IPRATROPIUM BROMIDE 2 PUFF: 17 AEROSOL, METERED RESPIRATORY (INHALATION) at 04:12

## 2024-05-25 RX ADMIN — GABAPENTIN 300 MG: 300 CAPSULE ORAL at 21:21

## 2024-05-25 RX ADMIN — OXYCODONE HYDROCHLORIDE AND ACETAMINOPHEN 500 MG: 500 TABLET ORAL at 09:13

## 2024-05-25 RX ADMIN — FLECAINIDE ACETATE 100 MG: 50 TABLET ORAL at 09:13

## 2024-05-25 RX ADMIN — ZOLPIDEM TARTRATE 10 MG: 5 TABLET ORAL at 23:29

## 2024-05-25 RX ADMIN — EZETIMIBE 10 MG: 10 TABLET ORAL at 21:20

## 2024-05-25 RX ADMIN — FUROSEMIDE 20 MG: 10 INJECTION, SOLUTION INTRAMUSCULAR; INTRAVENOUS at 12:39

## 2024-05-25 RX ADMIN — FLECAINIDE ACETATE 100 MG: 50 TABLET ORAL at 21:20

## 2024-05-25 RX ADMIN — ROPINIROLE HYDROCHLORIDE 2 MG: 1 TABLET, FILM COATED ORAL at 21:20

## 2024-05-25 NOTE — ASSESSMENT & PLAN NOTE
>>ASSESSMENT AND PLAN FOR HYPERLIPIDEMIA WRITTEN ON 5/25/2024  3:06 AM BY KIRIT CHAVIRA, DO    - Chronic hyperlipidemia  - Continue home ezetimibe (10 mg qhs)

## 2024-05-25 NOTE — ASSESSMENT & PLAN NOTE
>>ASSESSMENT AND PLAN FOR ESSENTIAL HYPERTENSION WRITTEN ON 5/25/2024  3:07 AM BY KIRIT CHAVIRA DO    Assessment:  Home Meds: dilitiazem, metoprolol, lasix  /73 on admission     Plan:  Continue home medications but will hold lasix for now  Outpatient follow-up with PCP

## 2024-05-25 NOTE — CONSULTS
"Pulmonary Consultation   Katiuska Che 78 y.o. female MRN: [id number]  Unit/Bed#: S -01 Encounter: [id number]      Reason for consultation: Cough, dyspnea, bronchiectasis    Requesting physician: Nick Liu MD     Impressions/Recommendations:     Acute hypoxic respiratory failure secondary to acute pulmonary edema-IV diuretic therapy per cardiology.  Titrate O2 to maintain saturations above 88%.  During my interview with the patient, her oxygen was off and saturations were remaining in the high 90s.    Mild bronchiectasis with possible exacerbation-exacerbations typically present with increasing shortness of breath, cough and wheeze, but not typically associated with worsening radiographic changes unless superimposed infection.  She does not have any suggestive symptoms of infection.  Therefore I believe that the chest x-ray changes are more related to pulmonary edema than known underlying bronchiectasis.  For the possibility of mild exacerbation, will add prednisone.    Paroxysmal atrial fibrillation with recent hospitalization with RVR-flecainide dosing was increased.  Cardiology entertaining whether her acute symptoms could be related to dose adjustment    Abnormal chest x-ray-suspect pulmonary edema.  Once optimally diuresed, consider repeating imaging.  If persistent changes, may need to repeat chest CT    Chief Complaint: \"I am coughing\"    History of Present Illness   HPI:  Katiuska Che is a 78 y.o. female who was recently evaluated by Dr. Magana in our office due to chronic cough.  Workup consisted of CT of the chest which showed mild basilar bronchiectasis and pulmonary function testing which was normal with the exception of mild diffusion impairment.  She was hospitalized from 5/20/2024 through 5/22/2024 with A-fib with RVR.  Medication adjustments were made and she was discharged home.  She presented back to the hospital yesterday after suffering a fall and with complaints of worsening " shortness of breath and palpitations.  No traumatic injuries noted.  On arrival, noted to have saturations on room air in the mid 80s.  Placed on supplemental oxygen at 2 L/min.  Patient states that her cough is mostly nonproductive.  At times, she coughs so violently that she has posttussive emesis.  She denies heartburn.  She has no fevers, chills or sweats.  As an outpatient, she was prescribed 3% saline via nebulizer which is somewhat beneficial.  She found nebulized Mucomyst to provoke an increase in her cough.  She also has albuterol to use on an as-needed basis    Review of systems: Denies lower extremity edema.  Denies fever, chills or sweats.  Denies headaches.  Denies skin rashes.  All others negative    Historical Information   Past Medical History:   Diagnosis Date    Atrial fibrillation with RVR (HCC)     Basal cell carcinoma     Bronchiectasis without complication (HCC)     Chronic diastolic CHF (congestive heart failure) (HCC)     Fibromyalgia     GERD (gastroesophageal reflux disease)     Hyperlipidemia     Hypertension     Hyponatremia     Malignant neoplasm of thyroid gland (HCC)     Meningioma (HCC)     MGUS (monoclonal gammopathy of unknown significance)      Past Surgical History:   Procedure Laterality Date    CATARACT EXTRACTION Bilateral     HYSTERECTOMY      OOPHORECTOMY      THYROID LOBECTOMY Left      No family history on file.    Tobacco history: Lifelong non-smoker    Family history: Negative from pulmonary perspective    Meds/Allergies   Current Facility-Administered Medications   Medication Dose Route Frequency    acetaminophen (TYLENOL) tablet 975 mg  975 mg Oral Q8H PRN    acetylcysteine (MUCOMYST) 200 mg/mL inhalation solution 400 mg  400 mg Nebulization Q4H PRN    albuterol (PROVENTIL HFA,VENTOLIN HFA) inhaler 2 puff  2 puff Inhalation Q6H PRN    apixaban (ELIQUIS) tablet 5 mg  5 mg Oral BID    ascorbic acid (VITAMIN C) tablet 500 mg  500 mg Oral Daily    benzonatate (TESSALON  "PERLES) capsule 100 mg  100 mg Oral TID PRN    diltiazem (CARDIZEM CD) 24 hr capsule 120 mg  120 mg Oral Daily    ezetimibe (ZETIA) tablet 10 mg  10 mg Oral HS    flecainide (TAMBOCOR) tablet 100 mg  100 mg Oral BID    gabapentin (NEURONTIN) capsule 300 mg  300 mg Oral HS    ipratropium (ATROVENT HFA) inhaler 2 puff  2 puff Inhalation Q6H    metoprolol succinate (TOPROL-XL) 24 hr tablet 100 mg  100 mg Oral BID    oxyCODONE (ROXICODONE) split tablet 2.5 mg  2.5 mg Oral Q4H PRN    rOPINIRole (REQUIP) tablet 2 mg  2 mg Oral HS    zolpidem (AMBIEN) tablet 10 mg  10 mg Oral HS PRN     Not on File    Vitals: Blood pressure 123/60, pulse 65, temperature 97.8 °F (36.6 °C), resp. rate 21, height 5' 2\" (1.575 m), weight 72.6 kg (160 lb 0.9 oz), SpO2 97%.,  2 L, Body mass index is 29.27 kg/m².  No intake or output data in the 24 hours ending 05/25/24 1326    Physical exam:     Physical Exam  Constitutional:       General: She is not in acute distress.     Appearance: Normal appearance.   HENT:      Head: Normocephalic.      Mouth/Throat:      Pharynx: No oropharyngeal exudate.   Eyes:      General: No scleral icterus.  Neck:      Vascular: No JVD.   Cardiovascular:      Rate and Rhythm: Normal rate and regular rhythm.   Pulmonary:      Breath sounds: Rales present. No wheezing.   Abdominal:      Palpations: Abdomen is soft.      Tenderness: There is no abdominal tenderness.   Musculoskeletal:         General: No swelling or deformity.      Cervical back: Neck supple.   Lymphadenopathy:      Cervical: No cervical adenopathy.   Skin:     General: Skin is warm and dry.   Neurological:      Mental Status: She is alert and oriented to person, place, and time.   Psychiatric:         Mood and Affect: Mood normal.         Labs: I have personally reviewed pertinent lab results.    Results from last 7 days   Lab Units 05/25/24  0410 05/24/24  0450 05/24/24  0400   WBC Thousand/uL 8.30 9.61  --    HEMOGLOBIN g/dL 10.5* 11.4*  --    I " STAT HEMOGLOBIN g/dl  --   --  12.6   HEMATOCRIT % 31.5* 34.0*  --    HEMATOCRIT, ISTAT %  --   --  37   PLATELETS Thousands/uL 202 211  --          Results from last 7 days   Lab Units 24  0410 24  0450 24  0400   POTASSIUM mmol/L 4.0 4.1  --    CHLORIDE mmol/L 97 95*  --    CO2 mmol/L 26 25  --    CO2, I-STAT mmol/L  --   --  24   BUN mg/dL 29* 34*  --    CREATININE mg/dL 0.96 0.99  --    CALCIUM mg/dL 8.7 9.1  --    GLUCOSE, ISTAT mg/dl  --   --  170*     Results from last 7 days   Lab Units 24  0450   INR  1.62*   PTT seconds 32       Immunoglobulins have been normal  VBG showed pH 7.4, pCO2 36    Video barium swallow performed in 2024 was normal    Echocardiogram pending    Imaging and other studies: I have personally reviewed pertinent reports.   and I have personally reviewed pertinent films in PACS CT of the chest from 2024 shows mild basilar predominant bronchiectasis    Chest x-ray from 2024 showed clear lungs    Chest x-ray on 2024 shows interval development of diffuse interstitial thickening and small bilateral pleural effusions    Pulmonary function testin2024  FEV1/FVC ratio 75%    FEV1 104% predicted  % predicted  No response to bronchodilators  TLC 90 % predicted  RV 84 % predicted  DLCO corrected for hemoglobin 57 % predicted  PFTs normal with the exception of diffusion impairment    Code Status: Level 1 - Full Code    Thank you for allowing us to participate in the care of your patient.    Maribell Aldana DO

## 2024-05-25 NOTE — ASSESSMENT & PLAN NOTE
Assessment:  Home Meds: dilitiazem, metoprolol, lasix  /73 on admission     Plan:  Continue home medications but will hold lasix for now  Outpatient follow-up with PCP

## 2024-05-25 NOTE — PLAN OF CARE
Problem: Nutrition/Hydration-ADULT  Goal: Nutrient/Hydration intake appropriate for improving, restoring or maintaining nutritional needs  Description: Monitor and assess patient's nutrition/hydration status for malnutrition. Collaborate with interdisciplinary team and initiate plan and interventions as ordered.  Monitor patient's weight and dietary intake as ordered or per policy. Utilize nutrition screening tool and intervene as necessary. Determine patient's food preferences and provide high-protein, high-caloric foods as appropriate.     INTERVENTIONS:  - Monitor oral intake, urinary output, labs, and treatment plans  - Assess nutrition and hydration status and recommend course of action  - Evaluate amount of meals eaten  - Assist patient with eating if necessary   - Allow adequate time for meals  - Recommend/ encourage appropriate diets, oral nutritional supplements, and vitamin/mineral supplements  - Order, calculate, and assess calorie counts as needed  - Recommend, monitor, and adjust tube feedings and TPN/PPN based on assessed needs  - Assess need for intravenous fluids  - Provide specific nutrition/hydration education as appropriate  - Include patient/family/caregiver in decisions related to nutrition  Outcome: Progressing     Problem: PAIN - ADULT  Goal: Verbalizes/displays adequate comfort level or baseline comfort level  Description: Interventions:  - Encourage patient to monitor pain and request assistance  - Assess pain using appropriate pain scale  - Administer analgesics based on type and severity of pain and evaluate response  - Implement non-pharmacological measures as appropriate and evaluate response  - Consider cultural and social influences on pain and pain management  - Notify physician/advanced practitioner if interventions unsuccessful or patient reports new pain  Outcome: Progressing     Problem: INFECTION - ADULT  Goal: Absence or prevention of progression during  hospitalization  Description: INTERVENTIONS:  - Assess and monitor for signs and symptoms of infection  - Monitor lab/diagnostic results  - Monitor all insertion sites, i.e. indwelling lines, tubes, and drains  - Monitor endotracheal if appropriate and nasal secretions for changes in amount and color  - [place] appropriate cooling/warming therapies per order  - Administer medications as ordered  - Instruct and encourage patient and family to use good hand hygiene technique  - Identify and instruct in appropriate isolation precautions for identified infection/condition  Outcome: Progressing  Goal: Absence of fever/infection during neutropenic period  Description: INTERVENTIONS:  - Monitor WBC    Outcome: Progressing     Problem: SAFETY ADULT  Goal: Patient will remain free of falls  Description: INTERVENTIONS:  - Educate patient/family on patient safety including physical limitations  - Instruct patient to call for assistance with activity   - Consult OT/PT to assist with strengthening/mobility   - Keep Call bell within reach  - Keep bed low and locked with side rails adjusted as appropriate  - Keep care items and personal belongings within reach  - Initiate and maintain comfort rounds  - Make Fall Risk Sign visible to staff  - Apply yellow socks and bracelet for high fall risk patients  - Consider moving patient to room near nurses station  Outcome: Progressing  Goal: Maintain or return to baseline ADL function  Description: INTERVENTIONS:  -  Assess patient's ability to carry out ADLs; assess patient's baseline for ADL function and identify physical deficits which impact ability to perform ADLs (bathing, care of mouth/teeth, toileting, grooming, dressing, etc.)  - Assess/evaluate cause of self-care deficits   - Assess range of motion  - Assess patient's mobility; develop plan if impaired  - Assess patient's need for assistive devices and provide as appropriate  - Encourage maximum independence but intervene and  supervise when necessary  - Involve family in performance of ADLs  - Assess for home care needs following discharge   - Consider OT consult to assist with ADL evaluation and planning for discharge  - Provide patient education as appropriate  Outcome: Progressing  Goal: Maintains/Returns to pre admission functional level  Description: INTERVENTIONS:  - Perform AM-PAC 6 Click Basic Mobility/ Daily Activity assessment daily.  - Set and communicate daily mobility goal to care team and patient/family/caregiver.   - Collaborate with rehabilitation services on mobility goals if consulted  - Out of bed for toileting  - Record patient progress and toleration of activity level   Outcome: Progressing     Problem: DISCHARGE PLANNING  Goal: Discharge to home or other facility with appropriate resources  Description: INTERVENTIONS:  - Identify barriers to discharge w/patient and caregiver  - Arrange for needed discharge resources and transportation as appropriate  - Identify discharge learning needs (meds, wound care, etc.)  - Arrange for interpretive services to assist at discharge as needed  - Refer to Case Management Department for coordinating discharge planning if the patient needs post-hospital services based on physician/advanced practitioner order or complex needs related to functional status, cognitive ability, or social support system  Outcome: Progressing     Problem: Knowledge Deficit  Goal: Patient/family/caregiver demonstrates understanding of disease process, treatment plan, medications, and discharge instructions  Description: Complete learning assessment and assess knowledge base.  Interventions:  - Provide teaching at level of understanding  - Provide teaching via preferred learning methods  Outcome: Progressing

## 2024-05-25 NOTE — PROGRESS NOTES
"Cardiology Progress Note - Katiuska Che 78 y.o. female MRN: [id number]    Unit/Bed#: S -01 Encounter: [id number]      Assessment:  PAF - now in NSR.  Possibly some symptoms related to increased flecainide dosing.  Will decrease to 100mg BID.  S/P PPM - no abnormalities per interrogation.  Acute hypoxemic respiratory failure - on IV diuretics.  Fall - unclear etiology.  Cough/dyspnea - patient with bronchiectasis.  Possible pulmonary etiology of dyspnea.      Plan:  Decrease flecainide to 100mg BID.  Continue IV diuretics.  Continue remainder of medications.  Consider pulmonary evaluation.     Subjective:   Patient with cough.  Feels poorly.    Review of Systems   Cardiovascular:  Negative for chest pain, leg swelling and palpitations.   Respiratory:  Positive for cough and shortness of breath.        Objective:   Vitals: Blood pressure 123/60, pulse 65, temperature 97.8 °F (36.6 °C), resp. rate 21, height 5' 2\" (1.575 m), weight 72.6 kg (160 lb 0.9 oz), SpO2 97%., Body mass index is 29.27 kg/m².,   Orthostatic Blood Pressures      Flowsheet Row Most Recent Value   Blood Pressure 123/60 filed at 2024 1112   Patient Position - Orthostatic VS Standing for 3 minutes - Orthostatic VS filed at 2024 1112           Systolic (24hrs), Av , Min:94 , Max:136     Diastolic (24hrs), Av, Min:60, Max:114    No intake or output data in the 24 hours ending 24 1139  Weight (last 2 days)       Date/Time Weight    24 0541 72.6 (160.05)    24 0351 77.8 (171.52)                Telemetry Review:     Physical Exam  Cardiovascular:      Rate and Rhythm: Normal rate and regular rhythm.      Heart sounds: Normal heart sounds. No murmur heard.     No friction rub. No gallop.   Pulmonary:      Breath sounds: Decreased breath sounds present. No wheezing or rales.           Laboratory Results:        CBC with diff:   Results from last 7 days   Lab Units 24  0410 24  0450 24  0400 "   WBC Thousand/uL 8.30 9.61  --    HEMOGLOBIN g/dL 10.5* 11.4*  --    I STAT HEMOGLOBIN g/dl  --   --  12.6   HEMATOCRIT % 31.5* 34.0*  --    HEMATOCRIT, ISTAT %  --   --  37   MCV fL 92 92  --    PLATELETS Thousands/uL 202 211  --    RBC Million/uL 3.42* 3.71*  --    MCH pg 30.7 30.7  --    MCHC g/dL 33.3 33.5  --    RDW % 15.3* 15.0  --    MPV fL 10.1 9.8  --    NRBC AUTO /100 WBCs  --  0  --          CMP:  Results from last 7 days   Lab Units 05/25/24 0410 05/24/24 0450 05/24/24 0400   POTASSIUM mmol/L 4.0 4.1  --    CHLORIDE mmol/L 97 95*  --    CO2 mmol/L 26 25  --    CO2, I-STAT mmol/L  --   --  24   BUN mg/dL 29* 34*  --    CREATININE mg/dL 0.96 0.99  --    GLUCOSE, ISTAT mg/dl  --   --  170*   CALCIUM mg/dL 8.7 9.1  --    EGFR ml/min/1.73sq m 56 54  --          BMP:  Results from last 7 days   Lab Units 05/25/24 0410 05/24/24 0450 05/24/24 0400   POTASSIUM mmol/L 4.0 4.1  --    CHLORIDE mmol/L 97 95*  --    CO2 mmol/L 26 25  --    CO2, I-STAT mmol/L  --   --  24   BUN mg/dL 29* 34*  --    CREATININE mg/dL 0.96 0.99  --    GLUCOSE, ISTAT mg/dl  --   --  170*   CALCIUM mg/dL 8.7 9.1  --        BNP:       Magnesium:       Coags:   Results from last 7 days   Lab Units 05/24/24 0450   PTT seconds 32   INR  1.62*             Meds/Allergies   Current Facility-Administered Medications   Medication Dose Route Frequency Provider Last Rate    acetaminophen  975 mg Oral Q8H PRLEONIE Rubin MD      acetylcysteine  400 mg Nebulization Q4H PRLEONIE Rubin MD      albuterol  2 puff Inhalation Q6H PRN Saul Rubin MD      apixaban  5 mg Oral BID Saul Rubin MD      ascorbic acid  500 mg Oral Daily Saul Rubin MD      diltiazem  120 mg Oral Daily Saul Rubin MD      ezetimibe  10 mg Oral HS Saul Rubin MD      flecainide  100 mg Oral BID ADOLPH Larose      furosemide  20 mg Intravenous Once Nick Liu MD      gabapentin  300 mg Oral HS Saul Rubin MD       ipratropium  2 puff Inhalation Q6H Saul Rubin MD      metoprolol succinate  100 mg Oral BID Saul Rubin MD      oxyCODONE  2.5 mg Oral Q4H PRN Saul Rubin MD      rOPINIRole  2 mg Oral HS Saul Rubin MD      zolpidem  10 mg Oral HS PRN Saul Rubin MD            Medications Prior to Admission:     acetylcysteine (MUCOMYST) 10 % nebulizer solution    apixaban (ELIQUIS) 5 mg    ascorbic Acid (VITAMIN C) 500 MG CPCR    diltiazem (CARDIZEM CD) 120 mg 24 hr capsule    flecainide (TAMBOCOR) 150 MG tablet    gabapentin (NEURONTIN) 300 mg capsule    ipratropium (ATROVENT HFA) 17 mcg/act inhaler    metoprolol succinate (TOPROL-XL) 100 mg 24 hr tablet    rOPINIRole (REQUIP XL) 2 MG 24 hr tablet    Assessment:  Principal Problem:    Falls  Active Problems:    Ambulatory dysfunction    Hyponatremia    Hyperlipidemia    Chronic diastolic CHF (congestive heart failure) (HCC)    Essential hypertension

## 2024-05-25 NOTE — PROGRESS NOTES
[facility]  Progress Note  Name: Katiuska Che I  MRN: [id number]  Unit/Bed#: S MS 329Bee I Date of Admission: 5/24/2024   Date of Service: 5/25/2024 I Hospital Day: 1    Assessment & Plan   Essential hypertension  Assessment & Plan  Assessment:  Home Meds: dilitiazem, metoprolol, lasix  /73 on admission     Plan:  Continue home medications but will hold lasix for now  Outpatient follow-up with PCP    Chronic diastolic CHF (congestive heart failure) (HCC)  Assessment & Plan  Wt Readings from Last 3 Encounters:   05/24/24 77.8 kg (171 lb 8.3 oz)        Assessment:  Not currently in exacerbation  Appears euvolemic on exam  No complaints of SOB or CP     Plan:  Continue home medications          Hyperlipidemia  Assessment & Plan  - Chronic hyperlipidemia  - Continue home ezetimibe (10 mg qhs)    Hyponatremia  Assessment & Plan  Assessment:  Patient with a history of chronic hyponatremia around 128-134  Currently Na 128     Plan:  Monitor BMP       Ambulatory dysfunction  Assessment & Plan  - Recurrent falls  - CM cx  - PT/OT cx    * Falls  Assessment & Plan  Assessment:  Patient reports having 2 falls today the one in which was observed by her daughter where she fell face flat  Denies syncope but endorses more frequent palpitations than her baseline  Feels that she is having more blurry vision as of lately, is seeing ophthalmologist outpatient. Has bilateral cataract extraction in past.  2/10 pain   Sodium 128, chronically low  Hgb 11.4  POCT BG showed pH 7.407, pCO2 36.8, pO2 23.0, HCO3 23.2, Sodium 130  Trauma workup was unremarkable     Plan:  Monitor telemetry  ECHO complete  Replete electrolytes  PT/OT consult  Pain control with tylenol for mild pain and oxycodoe for moderate pain  Lidocaine patch as needed             TRAUMA TERTIARY SURVEY NOTE    VTE Prophylaxis:Reason for no pharmacologic prophylaxis on eliquis      Disposition: CM assessing, SLIM    Code status:  Level 1 -  Full Code    Consultants: IP CONSULT TO CARDIOLOGY  IP CONSULT TO NUTRITION SERVICES    Subjective   Transfer from: Trauma to [facility]    Mechanism of Injury:Fall     Chief Complaint: None this morning    HPI/Last 24 hour events: Pt admitted for recurrent falls, dizziness, on eliquis. Has meningioma. No traumatic injuries, pt transferred to [facility] for medical management/syncope workup. She is currently asymptomatic at rest upon assessment this morning.     Objective   Vitals:   Temp:  [97.8 °F (36.6 °C)] 97.8 °F (36.6 °C)  HR:  [60-64] 61  Resp:  [16-24] 16  BP: (126-174)/() 126/61    I/O         05/23 0701  05/24 0700 05/24 0701  05/25 0700    P.O.  450    Total Intake(mL/kg)  450 (5.8)    Net  +450                   Physical Exam  Vitals and nursing note reviewed.   Constitutional:       General: She is not in acute distress.     Appearance: Normal appearance. She is not ill-appearing, toxic-appearing or diaphoretic.   HENT:      Head: Normocephalic and atraumatic.      Nose: Nose normal.      Mouth/Throat:      Mouth: Mucous membranes are moist.      Pharynx: Oropharynx is clear.   Eyes:      Extraocular Movements: Extraocular movements intact.      Conjunctiva/sclera: Conjunctivae normal.      Pupils: Pupils are equal, round, and reactive to light.   Cardiovascular:      Rate and Rhythm: Normal rate and regular rhythm.      Pulses: Normal pulses.      Heart sounds: Normal heart sounds.   Pulmonary:      Effort: Pulmonary effort is normal. No respiratory distress.      Breath sounds: Normal breath sounds. No stridor. No wheezing, rhonchi or rales.   Chest:      Chest wall: No tenderness.   Musculoskeletal:         General: No swelling, tenderness, deformity or signs of injury. Normal range of motion.      Cervical back: Normal range of motion and neck supple. No rigidity or tenderness.      Right lower leg: No edema.      Left lower leg: No edema.   Skin:     General: Skin is warm and dry.      Coloration: Skin  is not jaundiced or pale.      Findings: No bruising, erythema, lesion or rash.   Neurological:      General: No focal deficit present.      Mental Status: She is alert and oriented to person, place, and time.      Cranial Nerves: No cranial nerve deficit.      Sensory: No sensory deficit.      Motor: No weakness.      Coordination: Coordination normal.   Psychiatric:         Mood and Affect: Mood normal.         Behavior: Behavior normal.           Invasive Devices       Peripheral Intravenous Line  Duration             Peripheral IV 05/24/24 Left;Proximal;Ventral (anterior) Forearm 1 day                            Lab Results: Results: I have personally reviewed all pertinent laboratory/tests results, BMP/CMP:   Lab Results   Component Value Date    SODIUM 130 (L) 05/25/2024    K 4.0 05/25/2024    CL 97 05/25/2024    CO2 26 05/25/2024    BUN 29 (H) 05/25/2024    CREATININE 0.96 05/25/2024    CALCIUM 8.7 05/25/2024    EGFR 56 05/25/2024   , and CBC:   Lab Results   Component Value Date    WBC 8.30 05/25/2024    HGB 10.5 (L) 05/25/2024    HCT 31.5 (L) 05/25/2024    MCV 92 05/25/2024     05/25/2024    RBC 3.42 (L) 05/25/2024    MCH 30.7 05/25/2024    MCHC 33.3 05/25/2024    RDW 15.3 (H) 05/25/2024    MPV 10.1 05/25/2024       Imaging Results: I have personally reviewed pertinent reports.    Chest Xray(s): positive for acute findings: Possible interstitial edema   FAST exam(s): negative for acute findings   CT Scan(s): negative for acute findings   Additional Xray(s): negative for acute findings

## 2024-05-25 NOTE — PROGRESS NOTES
[facility]  Progress Note  Name: Katiuska Che I  MRN: [id number]  Unit/Bed#: S MS 329Bee I Date of Admission: 5/24/2024   Date of Service: 5/25/2024 I Hospital Day: 1    Assessment & Plan   Essential hypertension  Assessment & Plan  Assessment:  Home Meds: dilitiazem, metoprolol, lasix  /73 on admission    Plan:  Continue home medications  Outpatient follow-up with PCP at time of discharge    Chronic diastolic CHF (congestive heart failure) (HCC)  Assessment & Plan  Wt Readings from Last 3 Encounters:   05/24/24 77.8 kg (171 lb 8.3 oz)       Assessment:  Not currently in exacerbation  Appears euvolemic on exam  No complaints of SOB or CP    Plan:  Continue home medications  Pulmonology consult recommended per cardiology in setting of cough/dyspnea despite euvolemia   Patient received another dose of Lasix 20 mg IV 5/25/24  Flecainide reduced to 100 mg BID in setting of PAF        Hyperlipidemia  Assessment & Plan  Continue ezetimibe     Hyponatremia  Assessment & Plan  Assessment:  Patient with a history of chronic hyponatremia around 128-134      Plan:  Monitor BMP      Ambulatory dysfunction  Assessment & Plan  PT/OT consult    * Falls  Assessment & Plan  Assessment:  Patient reports having 2 falls today the one in which was observed by her daughter where she fell face flat  Denies syncope but endorses more frequent palpitations than her baseline  Feels that she is having more blurry vision as of lately, is seeing ophthalmologist outpatient. Has bilateral cataract extraction in past.  Complains of 2/10 lumbar spine pain with coughing, but 1/10 at rest  Sodium 128 but chronically low  Hgb 11.4  POCT BG showed pH 7.407, pCO2 36.8, pO2 23.0, HCO3 23.2, Sodium 130  Trauma workup was unremarkable: CT head demonstrates a hyperdensity in the left frontotemporal region.  After speaking with patient and chart review, this hyperdensity was present on a CT in 2022 and patient was diagnosed  with a hemangioma at that time. CT C-spine negative for acute traumatic injuries. FAST exam negative      Plan:  Monitor telemetry  F/u ECHO  Replete electrolytes  PT/OT   Pain control with tylenol for mild pain and oxycodoe for moderate pain  Lidocaine patch as needed               VTE Pharmacologic Prophylaxis: VTE Score: 4 Moderate Risk (Score 3-4) - Pharmacological DVT Prophylaxis Ordered: apixaban (Eliquis).    Mobility:   Basic Mobility Inpatient Raw Score: 22  JH-HLM Goal: 7: Walk 25 feet or more  JH-HLM Achieved: 6: Walk 10 steps or more  JH-HLM Goal achieved. Continue to encourage appropriate mobility.    Patient Centered Rounds: I performed bedside rounds with nursing staff today.  Discussions with Specialists or Other Care Team Provider:     Education and Discussions with Family / Patient: Updated  (daughter) via phone.    Current Length of Stay: 1 day(s)  Current Patient Status: Inpatient   Discharge Plan: Anticipate discharge in 24-48 hrs to be determined    Code Status: Level 1 - Full Code    Subjective:   No events overnight. Patient continues to report cough/dyspnea despite optimal diuresis per cardiology. Awaiting PT/OT evaluations. Patient without further concerns at this time.     Objective:     Vitals:   Temp (24hrs), Av.8 °F (36.6 °C), Min:97.8 °F (36.6 °C), Max:97.8 °F (36.6 °C)    Temp:  [97.8 °F (36.6 °C)] 97.8 °F (36.6 °C)  HR:  [60-86] 65  Resp:  [16-24] 21  BP: ()/() 123/60  SpO2:  [92 %-100 %] 97 %  Body mass index is 29.27 kg/m².     Input and Output Summary (last 24 hours):   No intake or output data in the 24 hours ending 24 1226      Physical Exam:   Physical Exam  Vitals and nursing note reviewed.   Constitutional:       General: She is not in acute distress.     Appearance: Normal appearance. She is not ill-appearing, toxic-appearing or diaphoretic.   HENT:      Head: Normocephalic and atraumatic.      Nose: Nose normal.      Mouth/Throat:       Mouth: Mucous membranes are moist.      Pharynx: Oropharynx is clear.   Eyes:      Extraocular Movements: Extraocular movements intact.      Conjunctiva/sclera: Conjunctivae normal.      Pupils: Pupils are equal, round, and reactive to light.   Cardiovascular:      Rate and Rhythm: Normal rate and regular rhythm.      Pulses: Normal pulses.      Heart sounds: Normal heart sounds.   Pulmonary:      Effort: Pulmonary effort is normal. No respiratory distress.      Breath sounds: Normal breath sounds. No stridor. No wheezing, rhonchi or rales.   Chest:      Chest wall: No tenderness.   Musculoskeletal:         General: No swelling, tenderness, deformity or signs of injury. Normal range of motion.      Cervical back: Normal range of motion and neck supple. No rigidity or tenderness.      Right lower leg: No edema.      Left lower leg: No edema.   Skin:     General: Skin is warm and dry.      Coloration: Skin is not jaundiced or pale.      Findings: No bruising, erythema, lesion or rash.   Neurological:      General: No focal deficit present.      Mental Status: She is alert and oriented to person, place, and time.      Cranial Nerves: No cranial nerve deficit.      Sensory: No sensory deficit.      Motor: No weakness.      Coordination: Coordination normal.   Psychiatric:         Mood and Affect: Mood normal.         Behavior: Behavior normal.          Additional Data:     Labs:  Results from last 7 days   Lab Units 05/25/24  0410 05/24/24  0450   WBC Thousand/uL 8.30 9.61   HEMOGLOBIN g/dL 10.5* 11.4*   HEMATOCRIT % 31.5* 34.0*   PLATELETS Thousands/uL 202 211   SEGS PCT %  --  72   LYMPHO PCT %  --  15   MONO PCT %  --  11   EOS PCT %  --  1     Results from last 7 days   Lab Units 05/25/24  0410   SODIUM mmol/L 130*   POTASSIUM mmol/L 4.0   CHLORIDE mmol/L 97   CO2 mmol/L 26   BUN mg/dL 29*   CREATININE mg/dL 0.96   ANION GAP mmol/L 7   CALCIUM mg/dL 8.7   GLUCOSE RANDOM mg/dL 142*     Results from last 7 days   Lab  Units 05/24/24  0450   INR  1.62*                   Lines/Drains:  Invasive Devices       Peripheral Intravenous Line  Duration             Peripheral IV 05/24/24 Left;Proximal;Ventral (anterior) Forearm 1 day                      Telemetry:  Telemetry Orders (From admission, onward)               24 Hour Telemetry Monitoring  Continuous x 24 Hours (Telem)        Expiring   Question:  Reason for 24 Hour Telemetry  Answer:  Syncope suspected to be cardiac in origin                     Telemetry Reviewed: Normal Sinus Rhythm  Indication for Continued Telemetry Use: Arrthymias requiring medical therapy             Imaging: Reviewed radiology reports from this admission including: chest xray and CT head    Recent Cultures (last 7 days):         Last 24 Hours Medication List:   Current Facility-Administered Medications   Medication Dose Route Frequency Provider Last Rate    acetaminophen  975 mg Oral Q8H PRN Saul Rubin MD      acetylcysteine  400 mg Nebulization Q4H PRN Saul Rubin MD      albuterol  2 puff Inhalation Q6H PRN Saul Rubin MD      apixaban  5 mg Oral BID Saul Rubin MD      ascorbic acid  500 mg Oral Daily Saul Rubin MD      benzonatate  100 mg Oral TID PRN Nick Liu MD      diltiazem  120 mg Oral Daily Saul Rubin MD      ezetimibe  10 mg Oral HS Saul Rubin MD      flecainide  100 mg Oral BID ADOLPH Larose      furosemide  20 mg Intravenous Once Nick Liu MD      gabapentin  300 mg Oral HS Saul Rubin MD      ipratropium  2 puff Inhalation Q6H Saul Rubin MD      metoprolol succinate  100 mg Oral BID Saul Rubin MD      oxyCODONE  2.5 mg Oral Q4H PRN Saul Rubin MD      rOPINIRole  2 mg Oral HS Saul Rubin MD      zolpidem  10 mg Oral HS PRN Saul Rubin MD          Today, Patient Was Seen By: Nick Liu MD    **Please Note: This note may have been constructed using a voice recognition  system.**

## 2024-05-25 NOTE — ASSESSMENT & PLAN NOTE
Assessment:  Patient reports having 2 falls today the one in which was observed by her daughter where she fell face flat  Denies syncope but endorses more frequent palpitations than her baseline  Feels that she is having more blurry vision as of lately, is seeing ophthalmologist outpatient. Has bilateral cataract extraction in past.  2/10 pain   Sodium 128, chronically low  Hgb 11.4  POCT BG showed pH 7.407, pCO2 36.8, pO2 23.0, HCO3 23.2, Sodium 130  Trauma workup was unremarkable     Plan:  Monitor telemetry  ECHO complete  Replete electrolytes  PT/OT consult  Pain control with tylenol for mild pain and oxycodoe for moderate pain  Lidocaine patch as needed

## 2024-05-25 NOTE — ASSESSMENT & PLAN NOTE
Wt Readings from Last 3 Encounters:   05/24/24 77.8 kg (171 lb 8.3 oz)        Assessment:  Not currently in exacerbation  Appears euvolemic on exam  No complaints of SOB or CP     Plan:  Continue home medications

## 2024-05-26 PROBLEM — J47.9 BRONCHIECTASIS (HCC): Status: ACTIVE | Noted: 2024-05-26

## 2024-05-26 PROBLEM — I48.0 PAROXYSMAL ATRIAL FIBRILLATION (HCC): Status: ACTIVE | Noted: 2024-05-26

## 2024-05-26 LAB
ANION GAP SERPL CALCULATED.3IONS-SCNC: 7 MMOL/L (ref 4–13)
ANION GAP SERPL CALCULATED.3IONS-SCNC: 7 MMOL/L (ref 4–13)
BUN SERPL-MCNC: 23 MG/DL (ref 5–25)
BUN SERPL-MCNC: 23 MG/DL (ref 5–25)
CALCIUM SERPL-MCNC: 8.7 MG/DL (ref 8.4–10.2)
CALCIUM SERPL-MCNC: 8.7 MG/DL (ref 8.4–10.2)
CHLORIDE SERPL-SCNC: 96 MMOL/L (ref 96–108)
CHLORIDE SERPL-SCNC: 96 MMOL/L (ref 96–108)
CO2 SERPL-SCNC: 26 MMOL/L (ref 21–32)
CO2 SERPL-SCNC: 26 MMOL/L (ref 21–32)
CREAT SERPL-MCNC: 0.81 MG/DL (ref 0.6–1.3)
CREAT SERPL-MCNC: 0.81 MG/DL (ref 0.6–1.3)
GFR SERPL CREATININE-BSD FRML MDRD: 69 ML/MIN/1.73SQ M
GFR SERPL CREATININE-BSD FRML MDRD: 69 ML/MIN/1.73SQ M
GLUCOSE SERPL-MCNC: 195 MG/DL (ref 65–140)
GLUCOSE SERPL-MCNC: 195 MG/DL (ref 65–140)
POTASSIUM SERPL-SCNC: 4.1 MMOL/L (ref 3.5–5.3)
POTASSIUM SERPL-SCNC: 4.1 MMOL/L (ref 3.5–5.3)
SODIUM SERPL-SCNC: 129 MMOL/L (ref 135–147)
SODIUM SERPL-SCNC: 129 MMOL/L (ref 135–147)

## 2024-05-26 PROCEDURE — 99232 SBSQ HOSP IP/OBS MODERATE 35: CPT | Performed by: INTERNAL MEDICINE

## 2024-05-26 PROCEDURE — 80048 BASIC METABOLIC PNL TOTAL CA: CPT

## 2024-05-26 RX ORDER — FUROSEMIDE 20 MG/1
20 TABLET ORAL DAILY
Status: DISCONTINUED | OUTPATIENT
Start: 2024-05-26 | End: 2024-05-27 | Stop reason: HOSPADM

## 2024-05-26 RX ADMIN — BENZONATATE 100 MG: 100 CAPSULE ORAL at 09:09

## 2024-05-26 RX ADMIN — PREDNISONE 40 MG: 20 TABLET ORAL at 09:06

## 2024-05-26 RX ADMIN — METOPROLOL SUCCINATE 100 MG: 100 TABLET, EXTENDED RELEASE ORAL at 09:05

## 2024-05-26 RX ADMIN — ZOLPIDEM TARTRATE 10 MG: 5 TABLET ORAL at 21:14

## 2024-05-26 RX ADMIN — APIXABAN 5 MG: 5 TABLET, FILM COATED ORAL at 21:14

## 2024-05-26 RX ADMIN — DILTIAZEM HYDROCHLORIDE 120 MG: 120 CAPSULE, COATED, EXTENDED RELEASE ORAL at 09:06

## 2024-05-26 RX ADMIN — FLECAINIDE ACETATE 100 MG: 50 TABLET ORAL at 21:13

## 2024-05-26 RX ADMIN — GABAPENTIN 300 MG: 300 CAPSULE ORAL at 21:14

## 2024-05-26 RX ADMIN — APIXABAN 5 MG: 5 TABLET, FILM COATED ORAL at 09:06

## 2024-05-26 RX ADMIN — FUROSEMIDE 20 MG: 20 TABLET ORAL at 12:51

## 2024-05-26 RX ADMIN — EZETIMIBE 10 MG: 10 TABLET ORAL at 21:13

## 2024-05-26 RX ADMIN — ROPINIROLE HYDROCHLORIDE 2 MG: 1 TABLET, FILM COATED ORAL at 21:14

## 2024-05-26 RX ADMIN — BENZONATATE 100 MG: 100 CAPSULE ORAL at 22:47

## 2024-05-26 RX ADMIN — FLECAINIDE ACETATE 100 MG: 50 TABLET ORAL at 09:05

## 2024-05-26 RX ADMIN — IPRATROPIUM BROMIDE 2 PUFF: 17 AEROSOL, METERED RESPIRATORY (INHALATION) at 12:51

## 2024-05-26 RX ADMIN — IPRATROPIUM BROMIDE 2 PUFF: 17 AEROSOL, METERED RESPIRATORY (INHALATION) at 17:31

## 2024-05-26 RX ADMIN — METOPROLOL SUCCINATE 100 MG: 100 TABLET, EXTENDED RELEASE ORAL at 21:14

## 2024-05-26 RX ADMIN — IPRATROPIUM BROMIDE 2 PUFF: 17 AEROSOL, METERED RESPIRATORY (INHALATION) at 04:53

## 2024-05-26 RX ADMIN — OXYCODONE HYDROCHLORIDE AND ACETAMINOPHEN 500 MG: 500 TABLET ORAL at 09:05

## 2024-05-26 RX ADMIN — IPRATROPIUM BROMIDE 2 PUFF: 17 AEROSOL, METERED RESPIRATORY (INHALATION) at 22:03

## 2024-05-26 NOTE — DISCHARGE SUMMARY
[facility]  Discharge- Katiuska Che 1945, 78 y.o. female MRN: [id number]  Unit/Bed#: S -Jolie Encounter: [id number]  Primary Care Provider: Jessee Roldan MD   Date and time admitted to hospital: 5/24/2024  3:51 AM    * Falls  Assessment & Plan  Assessment:  Patient reports having 2 falls today the one in which was observed by her daughter where she fell face flat  Denies syncope but endorses more frequent palpitations than her baseline  Feels that she is having more blurry vision as of lately, is seeing ophthalmologist outpatient. Has bilateral cataract extraction in past.  Complains of 2/10 lumbar spine pain with coughing, but 1/10 at rest  Sodium 128 but chronically low  Hgb 11.4  POCT BG showed pH 7.407, pCO2 36.8, pO2 23.0, HCO3 23.2, Sodium 130  TTE this admission shows worsening mitral regurg.  LVEF 55% with normal systolic function.  Elevated right ventricular pressure.  Trauma workup was unremarkable: CT head demonstrates a hyperdensity in the left frontotemporal region.  After speaking with patient and chart review, this hyperdensity was present on a CT in 2022 and patient was diagnosed with a hemangioma at that time. CT C-spine negative for acute traumatic injuries. FAST exam negative      Plan:  Monitor telemetry  PT/OT   Pain control with tylenol for mild pain and oxycodoe for moderate pain, Lidocaine patch as needed    Bronchiectasis (HCC)  Assessment & Plan  Pulmonology consulted.  Prednisone taper at discharge. Follow-up outpatient    Paroxysmal atrial fibrillation (HCC)  Assessment & Plan  Status post PPM.  Flecainide dosing decreased to 100 twice a day.  Continue diltiazem 120 mg daily and metoprolol 100 mg twice a day  Anticoagulated with Eliquis 5 mg twice a day    Essential hypertension  Assessment & Plan  Assessment:  Home Meds: dilitiazem, metoprolol, lasix  /73 on admission    Plan:  Continue home medications  Outpatient follow-up with PCP at time  of discharge    Chronic diastolic CHF (congestive heart failure) (HCC)  Assessment & Plan  Wt Readings from Last 3 Encounters:   05/26/24 72.8 kg (160 lb 7.9 oz)       Assessment:  Not currently in exacerbation  Appears euvolemic on exam  No complaints of SOB or CP.  Home med is 20 mg of oral Lasix daily    Plan:  Continue home medications  Pulmonology consult recommended per cardiology in setting of cough/dyspnea despite euvolemia   Patient received another dose of Lasix 20 mg IV 5/25/24  Flecainide reduced to 100 mg BID in setting of PAF  Discharged with 20 mg Lasix orally    Hyperlipidemia  Assessment & Plan  Continue ezetimibe     Hyponatremia  Assessment & Plan  Assessment:  Patient with a history of chronic hyponatremia around 128-134      Plan:  Monitor BMP and follow-up outpatient      Ambulatory dysfunction  Assessment & Plan  PT/OT consult recommending level 3      Medical Problems       Resolved Problems  Date Reviewed: 5/24/2024   None       Discharging Resident: Kyle Brunner, MD  Discharging Attending: Katie Bernardo MD  PCP: Jessee Roldan MD  Admission Date:   Admission Orders (From admission, onward)       Ordered        05/24/24 0525  INPATIENT ADMISSION  Once                          Discharge Date: 05/27/24    Consultations During Hospital Stay:  Cardiology  Pulmonology    Procedures Performed:   None    Significant Findings / Test Results:   , X-ray showed diffuse interstitial thickening bilaterally suspicious for interstitial edema.  Small bilateral pleural effusions.    Incidental Findings:   CT head: Small rounded hyperdense foci in the left frontotemporal region likely represent calcifications. However, short interval follow-up CT in 6-8 hours is recommended.    Incidental thyroid nodule(s) for which nonemergent thyroid ultrasound is recommended.   I reviewed the above mentioned incidental findings with the patient and/or family and they expressed understanding.    Test Results Pending at  Discharge (will require follow up):   None     Outpatient Tests Requested:  BMP in 1 week  Chest x-ray or repeat CT chest if symptoms fail to improve    Complications: None    Reason for Admission: Fall    Hospital Course:   Katiuska Che is a 78 y.o. female patient PMH of atrial fibrillation with RVR and biventricular pacemaker, essential hypertension, allergic rhinitis, fibromyalgia, hyperlipidemia who originally presented to the hospital on 5/24/2024 due to fall.  She was evaluated by trauma and cleared as there is no evidence of any acute fracture or injury.  Patient was eval by physical therapy who recommended level 3 minimum resources.      Patient was evaluated by pulmonology who discharged with prednisone taper for possibility of mild bronchiectasis.  She will be taking oral Lasix 20 mg daily at discharge.  Repeat BMP in 1 week.    Please see above list of diagnoses and related plan for additional information.     Condition at Discharge: good    Discharge Day Visit / Exam:   Subjective: Patient feels well and tolerated physical therapy.  No acute events overnight.  Stable for discharge.    Physical Exam  Vitals and nursing note reviewed.   Constitutional:       General: She is not in acute distress.     Appearance: Normal appearance. She is not ill-appearing.      Interventions: Nasal cannula in place.   HENT:      Head: Atraumatic.      Mouth/Throat:      Mouth: Mucous membranes are moist.   Eyes:      General: No scleral icterus.  Cardiovascular:      Rate and Rhythm: Normal rate and regular rhythm.      Pulses: Normal pulses.      Heart sounds: Murmur heard.      No gallop.   Pulmonary:      Effort: Pulmonary effort is normal. No respiratory distress.      Breath sounds: No wheezing, rhonchi or rales (Mild at bases).   Abdominal:      General: There is no distension.      Palpations: Abdomen is soft.   Musculoskeletal:      Cervical back: Neck supple.      Right lower leg: No edema.      Left lower leg:  No edema.   Skin:     General: Skin is warm and dry.      Capillary Refill: Capillary refill takes less than 2 seconds.   Neurological:      Mental Status: She is alert and oriented to person, place, and time.   Psychiatric:         Mood and Affect: Mood normal.         Behavior: Behavior normal.        Vitals:    05/27/24 1336   BP:    Pulse:    Resp:    Temp:    SpO2: 97%       Discussion with Family: Updated  (daughter) at bedside.  By senior resident    Discharge instructions/Information to patient and family:   See after visit summary for information provided to patient and family.      Provisions for Follow-Up Care:  See after visit summary for information related to follow-up care and any pertinent home health orders.      Mobility at time of Discharge:   Basic Mobility Inpatient Raw Score: 23  JH-HLM Goal: 7: Walk 25 feet or more  JH-HLM Achieved: 8: Walk 250 feet ot more  HLM Goal achieved. Continue to encourage appropriate mobility.     Disposition:   Home with [facility] Services (Reminder: Complete face to face encounter)    Planned Readmission: No    Discharge Medications:  See after visit summary for reconciled discharge medications provided to patient and/or family.      **Please Note: This note may have been constructed using a voice recognition system**

## 2024-05-26 NOTE — ASSESSMENT & PLAN NOTE
Assessment:  Patient reports having 2 falls today the one in which was observed by her daughter where she fell face flat  Denies syncope but endorses more frequent palpitations than her baseline  Feels that she is having more blurry vision as of lately, is seeing ophthalmologist outpatient. Has bilateral cataract extraction in past.  Complains of 2/10 lumbar spine pain with coughing, but 1/10 at rest  Sodium 128 but chronically low  Hgb 11.4  POCT BG showed pH 7.407, pCO2 36.8, pO2 23.0, HCO3 23.2, Sodium 130  TTE this admission shows worsening mitral regurg.  LVEF 55% with normal systolic function.  Elevated right ventricular pressure.  Trauma workup was unremarkable: CT head demonstrates a hyperdensity in the left frontotemporal region.  After speaking with patient and chart review, this hyperdensity was present on a CT in 2022 and patient was diagnosed with a hemangioma at that time. CT C-spine negative for acute traumatic injuries. FAST exam negative      Plan:  Monitor telemetry  PT/OT   Pain control with tylenol for mild pain and oxycodoe for moderate pain, Lidocaine patch as needed

## 2024-05-26 NOTE — ASSESSMENT & PLAN NOTE
>>ASSESSMENT AND PLAN FOR ESSENTIAL HYPERTENSION WRITTEN ON 5/26/2024 11:35 AM BY KYLE BRUNNER, MD    Assessment:  Home Meds: dilitiazem, metoprolol, lasix  /73 on admission    Plan:  Continue home medications  Outpatient follow-up with PCP at time of discharge

## 2024-05-26 NOTE — CASE MANAGEMENT
Case Management Progress Note    Patient name Katiuska Che  Location S /S -01 MRN [id number]  : 1945 Date 2024       LOS (days): 2  Geometric Mean LOS (GMLOS) (days): 3.1  Days to GMLOS:0.8        OBJECTIVE:        Current admission status: Inpatient  Preferred Pharmacy: No Pharmacies Listed  Primary Care Provider: Jessee Roldan MD    Primary Insurance: MEDICARE  Secondary Insurance: [employer]    PROGRESS NOTE:    [facility] is able to accept. Pt reported to CM this is her preference for [facility]. CM reserved and updated f/u providers.    CM did update pt's daughter, Jayna, regarding [facility]. She did speak with Dr. Bernardo who is reaching out to therapy. CM did review if the rec remains with [facility] that [facility] is all set up. If not and rec is for [facility] that CM will f/u for referrals. Jayna understands she just wants to make sure that the pt is safe returning home.

## 2024-05-26 NOTE — ASSESSMENT & PLAN NOTE
>>ASSESSMENT AND PLAN FOR HYPERLIPIDEMIA WRITTEN ON 5/26/2024  9:32 AM BY KYLE BRUNNER, MD    Continue ezetimibe

## 2024-05-26 NOTE — INCIDENTAL FINDINGS
The following findings require follow up:  Radiographic finding   Finding: CT spine: Enlarged right thyroid containing a hypodense nodule measuring 2.2 cm. Left thyroid is absent    Follow up required: Thyroid ultrasound   Follow up should be done within 3 month(s)    Please notify the following clinician to assist with the follow up:   Dr. Roldan

## 2024-05-26 NOTE — PROGRESS NOTES
"Cardiology Progress Note - Katiuska Che 78 y.o. female MRN: [id number]    Unit/Bed#: S -01 Encounter: [id number]      Assessment:  PAF - now in NSR.  Possibly some symptoms related to increased flecainide dosing.  Will decrease to 100mg BID.  S/P PPM - no abnormalities per interrogation.  Acute hypoxemic respiratory failure - on IV diuretics.  Fall - unclear etiology.  Cough/dyspnea - patient with bronchiectasis.  Possible pulmonary etiology of dyspnea.  Appreciate Pulmonary input.     Plan:  Continue flecainide to 100mg BID.  Oral diuretics - furosemide 20mg daily.  Continue remainder of medications.  Stable for discharge from cardiac standpoint.     Subjective:   Patient feeling better.     Review of Systems   Cardiovascular:  Negative for chest pain, leg swelling and palpitations.   Respiratory:  Positive for cough and shortness of breath.        Objective:   Vitals: Blood pressure 132/76, pulse 60, temperature 98.1 °F (36.7 °C), resp. rate 18, height 5' 2\" (1.575 m), weight 72.8 kg (160 lb 7.9 oz), SpO2 95%., Body mass index is 29.35 kg/m².,   Orthostatic Blood Pressures      Flowsheet Row Most Recent Value   Blood Pressure 132/76 filed at 2024 0737   Patient Position - Orthostatic VS Standing for 3 minutes - Orthostatic VS filed at 2024 1112           Systolic (24hrs), Av , Min:123 , Max:152     Diastolic (24hrs), Av, Min:55, Max:76      Intake/Output Summary (Last 24 hours) at 2024 1109  Last data filed at 2024 0900  Gross per 24 hour   Intake 360 ml   Output --   Net 360 ml     Weight (last 2 days)       Date/Time Weight    24 0535 72.8 (160.5)    24 0448 72.8 (160.5)    24 1210 72.6 (160.05)    24 0541 72.6 (160.05)    24 0351 77.8 (171.52)                Telemetry Review:     Physical Exam  Cardiovascular:      Rate and Rhythm: Normal rate and regular rhythm.      Heart sounds: Normal heart sounds. No murmur heard.     No friction rub. No " gallop.   Pulmonary:      Breath sounds: Decreased breath sounds present. No wheezing or rales.           Laboratory Results:        CBC with diff:   Results from last 7 days   Lab Units 05/25/24 0410 05/24/24 0450 05/24/24 0400   WBC Thousand/uL 8.30 9.61  --    HEMOGLOBIN g/dL 10.5* 11.4*  --    I STAT HEMOGLOBIN g/dl  --   --  12.6   HEMATOCRIT % 31.5* 34.0*  --    HEMATOCRIT, ISTAT %  --   --  37   MCV fL 92 92  --    PLATELETS Thousands/uL 202 211  --    RBC Million/uL 3.42* 3.71*  --    MCH pg 30.7 30.7  --    MCHC g/dL 33.3 33.5  --    RDW % 15.3* 15.0  --    MPV fL 10.1 9.8  --    NRBC AUTO /100 WBCs  --  0  --          CMP:  Results from last 7 days   Lab Units 05/26/24 0536 05/25/24 0410 05/24/24 0450 05/24/24 0400   POTASSIUM mmol/L 4.1 4.0 4.1  --    CHLORIDE mmol/L 96 97 95*  --    CO2 mmol/L 26 26 25  --    CO2, I-STAT mmol/L  --   --   --  24   BUN mg/dL 23 29* 34*  --    CREATININE mg/dL 0.81 0.96 0.99  --    GLUCOSE, ISTAT mg/dl  --   --   --  170*   CALCIUM mg/dL 8.7 8.7 9.1  --    EGFR ml/min/1.73sq m 69 56 54  --          BMP:  Results from last 7 days   Lab Units 05/26/24 0536 05/25/24 0410 05/24/24 0450 05/24/24 0400   POTASSIUM mmol/L 4.1 4.0 4.1  --    CHLORIDE mmol/L 96 97 95*  --    CO2 mmol/L 26 26 25  --    CO2, I-STAT mmol/L  --   --   --  24   BUN mg/dL 23 29* 34*  --    CREATININE mg/dL 0.81 0.96 0.99  --    GLUCOSE, ISTAT mg/dl  --   --   --  170*   CALCIUM mg/dL 8.7 8.7 9.1  --        BNP:       Magnesium:       Coags:   Results from last 7 days   Lab Units 05/24/24  0450   PTT seconds 32   INR  1.62*             Meds/Allergies   Current Facility-Administered Medications   Medication Dose Route Frequency Provider Last Rate    acetaminophen  975 mg Oral Q8H PRN Saul Rubin MD      albuterol  2 puff Inhalation Q6H PRN Saul Rubin MD      apixaban  5 mg Oral BID Saul Rubin MD      ascorbic acid  500 mg Oral Daily Saul Rubin MD      benzonatate  100  mg Oral TID PRN Nick Liu MD      diltiazem  120 mg Oral Daily Saul Rubin MD      ezetimibe  10 mg Oral HS Saul Rubin MD      flecainide  100 mg Oral BID ADOLPH Larose      gabapentin  300 mg Oral HS Saul Rubin MD      ipratropium  2 puff Inhalation Q6H Saul Rubin MD      metoprolol succinate  100 mg Oral BID Saul Rubin MD      oxyCODONE  2.5 mg Oral Q4H PRN Saul Rubin MD      predniSONE  40 mg Oral Daily Maribell Aldana DO      rOPINIRole  2 mg Oral HS Saul Rubin MD      zolpidem  10 mg Oral HS PRN Saul Rubin MD            Medications Prior to Admission:     acetylcysteine (MUCOMYST) 10 % nebulizer solution    apixaban (ELIQUIS) 5 mg    ascorbic Acid (VITAMIN C) 500 MG CPCR    diltiazem (CARDIZEM CD) 120 mg 24 hr capsule    flecainide (TAMBOCOR) 150 MG tablet    gabapentin (NEURONTIN) 300 mg capsule    ipratropium (ATROVENT HFA) 17 mcg/act inhaler    metoprolol succinate (TOPROL-XL) 100 mg 24 hr tablet    rOPINIRole (REQUIP XL) 2 MG 24 hr tablet    Assessment:  Principal Problem:    Falls  Active Problems:    Ambulatory dysfunction    Hyponatremia    Hyperlipidemia    Chronic diastolic CHF (congestive heart failure) ([facility])    Essential hypertension    Paroxysmal atrial fibrillation ([facility])    Bronchiectasis ([facility])

## 2024-05-26 NOTE — ASSESSMENT & PLAN NOTE
Wt Readings from Last 3 Encounters:   05/26/24 72.8 kg (160 lb 7.9 oz)       Assessment:  Not currently in exacerbation  Appears euvolemic on exam  No complaints of SOB or CP.  Home med is 20 mg of oral Lasix daily    Plan:  Continue home medications  Pulmonology consult recommended per cardiology in setting of cough/dyspnea despite euvolemia   Patient received another dose of Lasix 20 mg IV 5/25/24  Flecainide reduced to 100 mg BID in setting of PAF  Discharged with 20 mg Lasix orally

## 2024-05-26 NOTE — CASE MANAGEMENT
Case Management Discharge Planning Note    Patient name Katiuska Che  Location S /S -01 MRN [id number]  : 1945 Date 2024       Current Admission Date: 2024  Current Admission Diagnosis:Falls   Patient Active Problem List    Diagnosis Date Noted Date Diagnosed    Paroxysmal atrial fibrillation (HCC) 2024     Bronchiectasis (HCC) 2024     Falls 2024     Ambulatory dysfunction 2024     Hyponatremia 2024     Hyperlipidemia 2024     Chronic diastolic CHF (congestive heart failure) (HCC) 2024     Essential hypertension 2024       LOS (days): 2  Geometric Mean LOS (GMLOS) (days): 3.1  Days to GMLOS:0.8     OBJECTIVE:  Risk of Unplanned Readmission Score: 15.18         Current admission status: Inpatient   Preferred Pharmacy: No Pharmacies Listed  Primary Care Provider: Jessee Roldan MD    Primary Insurance: MEDICARE  [facility]    DISCHARGE DETAILS:    Discharge planning discussed with:: pt and Jayna chapa  Freedom of Choice: Yes  Comments - Freedom of Choice: HHC    Contacts  Patient Contacts: Jayna Chapa  Relationship to Patient:: Family  Contact Method: Phone  Phone Number: see facesheet  Reason/Outcome: Continuity of Care, Emergency Contact, Referral, Discharge Planning    Requested Home Health Care         Is the patient interested in HHC at discharge?: Yes  Home Health Discipline requested:: Nursing, Occupational Therapy, Physical Therapy  Home Health Agency Name:: Other (TBD)  Home Health Follow-Up Provider:: PCP  Home Health Services Needed:: Evaluate Functional Status and Safety, Gait/ADL Training, Other (comment), Strengthening/Theraputic Exercises to Improve Function  Homebound Criteria Met:: Uses an Assist Device (i.e. cane, walker, etc)  Supporting Clincal Findings:: Limited Endurance    Other Referral/Resources/Interventions Provided:  Interventions: HHC  Referral Comments: CM met with pt at bedside  and spoke with her daughter, Jayna, by phone in the pt's room. Both agreeable to C. Pt's preference is [facility]. Aware CM sent referrals and will f/u on availability.

## 2024-05-26 NOTE — PROGRESS NOTES
[facility]  Progress Note  Name: Katiuska Che I  MRN: [id number]  Unit/Bed#: S MS 329Bee I Date of Admission: 5/24/2024   Date of Service: 5/26/2024 I Hospital Day: 2    Assessment & Plan   * Falls  Assessment & Plan  Assessment:  Patient reports having 2 falls today the one in which was observed by her daughter where she fell face flat  Denies syncope but endorses more frequent palpitations than her baseline  Feels that she is having more blurry vision as of lately, is seeing ophthalmologist outpatient. Has bilateral cataract extraction in past.  Complains of 2/10 lumbar spine pain with coughing, but 1/10 at rest  Sodium 128 but chronically low  Hgb 11.4  POCT BG showed pH 7.407, pCO2 36.8, pO2 23.0, HCO3 23.2, Sodium 130  TTE this admission shows worsening mitral regurg.  LVEF 55% with normal systolic function.  Elevated right ventricular pressure.  Trauma workup was unremarkable: CT head demonstrates a hyperdensity in the left frontotemporal region.  After speaking with patient and chart review, this hyperdensity was present on a CT in 2022 and patient was diagnosed with a hemangioma at that time. CT C-spine negative for acute traumatic injuries. FAST exam negative      Plan:  Monitor telemetry  PT/OT   Pain control with tylenol for mild pain and oxycodoe for moderate pain, Lidocaine patch as needed    Bronchiectasis (HCC)  Assessment & Plan  Pulmonology consulted.  Prednisone 40 mg was added for the possibility of mild exacerbation    Paroxysmal atrial fibrillation (HCC)  Assessment & Plan  Status post PPM.  Flecainide dosing decreased to 100 twice a day.  Continue diltiazem 120 mg daily and metoprolol 100 mg twice a day  Anticoagulated with Eliquis 5 mg twice a day    Essential hypertension  Assessment & Plan  Assessment:  Home Meds: dilitiazem, metoprolol, lasix  /73 on admission    Plan:  Continue home medications  Outpatient follow-up with PCP at time of  discharge    Chronic diastolic CHF (congestive heart failure) (HCC)  Assessment & Plan  Wt Readings from Last 3 Encounters:   24 72.8 kg (160 lb 7.9 oz)       Assessment:  Not currently in exacerbation  Appears euvolemic on exam  No complaints of SOB or CP.  Home med is 20 mg of oral Lasix daily    Plan:  Continue home medications  Pulmonology consult recommended per cardiology in setting of cough/dyspnea despite euvolemia   Patient received another dose of Lasix 20 mg IV 24  Flecainide reduced to 100 mg BID in setting of PAF  Discuss resuming patient's home diuretic regimen of 20 mg Lasix orally    Hyperlipidemia  Assessment & Plan  Continue ezetimibe     Hyponatremia  Assessment & Plan  Assessment:  Patient with a history of chronic hyponatremia around 128-134      Plan:  Monitor BMP      Ambulatory dysfunction  Assessment & Plan  PT/OT consult             VTE Pharmacologic Prophylaxis: VTE Score: 4 Moderate Risk (Score 3-4) - Pharmacological DVT Prophylaxis Ordered: apixaban (Eliquis).    Mobility:   Basic Mobility Inpatient Raw Score: 22  JH-HLM Goal: 7: Walk 25 feet or more  JH-HLM Achieved: 7: Walk 25 feet or more  JH-HLM Goal achieved. Continue to encourage appropriate mobility.    Patient Centered Rounds: I performed bedside rounds with nursing staff today.  Discussions with Specialists or Other Care Team Provider: Cardiology, pulmonology    Education and Discussions with Family / Patient:  We will update family.     Current Length of Stay: 2 day(s)  Current Patient Status: Inpatient   Discharge Plan: Anticipate discharge later today or tomorrow to home.    Code Status: Level 1 - Full Code    Subjective:   Patient was seen resting at bedside.  She had her nasal cannula on but was able to be weaned to room air without any breathing difficulties.  She denies any new complaints.  No new headache, fever, or chills.  No acute events overnight.    Objective:     Vitals:   Temp (24hrs), Av.5 °F (36.9  °C), Min:98.1 °F (36.7 °C), Max:98.9 °F (37.2 °C)    Temp:  [98.1 °F (36.7 °C)-98.9 °F (37.2 °C)] 98.1 °F (36.7 °C)  HR:  [60-86] 60  Resp:  [18-21] 18  BP: ()/(55-76) 132/76  SpO2:  [89 %-98 %] 95 %  Body mass index is 29.35 kg/m².     Input and Output Summary (last 24 hours):     Intake/Output Summary (Last 24 hours) at 5/26/2024 0942  Last data filed at 5/26/2024 0600  Gross per 24 hour   Intake 120 ml   Output --   Net 120 ml       Physical Exam:   Physical Exam  Vitals and nursing note reviewed.   Constitutional:       General: She is not in acute distress.     Appearance: Normal appearance. She is not ill-appearing.      Interventions: Nasal cannula in place.   HENT:      Head: Atraumatic.      Mouth/Throat:      Mouth: Mucous membranes are moist.   Eyes:      General: No scleral icterus.  Cardiovascular:      Rate and Rhythm: Normal rate and regular rhythm.      Pulses: Normal pulses.      Heart sounds: Murmur heard.      No gallop.   Pulmonary:      Effort: Pulmonary effort is normal. No respiratory distress.      Breath sounds: Rales (Mild at bases) present. No wheezing.   Abdominal:      General: There is no distension.      Palpations: Abdomen is soft.   Musculoskeletal:      Cervical back: Neck supple.      Right lower leg: No edema.      Left lower leg: No edema.   Skin:     General: Skin is warm and dry.      Capillary Refill: Capillary refill takes less than 2 seconds.   Neurological:      Mental Status: She is alert and oriented to person, place, and time.   Psychiatric:         Mood and Affect: Mood normal.         Behavior: Behavior normal.          Additional Data:     Labs:  Results from last 7 days   Lab Units 05/25/24  0410 05/24/24  0450   WBC Thousand/uL 8.30 9.61   HEMOGLOBIN g/dL 10.5* 11.4*   HEMATOCRIT % 31.5* 34.0*   PLATELETS Thousands/uL 202 211   SEGS PCT %  --  72   LYMPHO PCT %  --  15   MONO PCT %  --  11   EOS PCT %  --  1     Results from last 7 days   Lab Units  05/26/24  0536   SODIUM mmol/L 129*   POTASSIUM mmol/L 4.1   CHLORIDE mmol/L 96   CO2 mmol/L 26   BUN mg/dL 23   CREATININE mg/dL 0.81   ANION GAP mmol/L 7   CALCIUM mg/dL 8.7   GLUCOSE RANDOM mg/dL 195*     Results from last 7 days   Lab Units 05/24/24  0450   INR  1.62*                   Lines/Drains:  Invasive Devices       Peripheral Intravenous Line  Duration             Peripheral IV 05/24/24 Left;Proximal;Ventral (anterior) Forearm 2 days                      Telemetry:  Telemetry Orders (From admission, onward)               24 Hour Telemetry Monitoring  Continuous x 24 Hours (Telem)        Question:  Reason for 24 Hour Telemetry  Answer:  Syncope suspected to be cardiac in origin                     Telemetry Reviewed: Normal Sinus Rhythm  Indication for Continued Telemetry Use: No indication for continued use. Will discontinue.              Imaging: Reviewed radiology reports from this admission including: ECHO    Recent Cultures (last 7 days):         Last 24 Hours Medication List:   Current Facility-Administered Medications   Medication Dose Route Frequency Provider Last Rate    acetaminophen  975 mg Oral Q8H PRN Saul Rubin MD      albuterol  2 puff Inhalation Q6H PRN Saul Rubin MD      apixaban  5 mg Oral BID Saul Rubin MD      ascorbic acid  500 mg Oral Daily Saul Rubin MD      benzonatate  100 mg Oral TID PRN Nick Liu MD      diltiazem  120 mg Oral Daily Saul Rubin MD      ezetimibe  10 mg Oral HS Saul Rubin MD      flecainide  100 mg Oral BID ADOLPH Larose      gabapentin  300 mg Oral HS Saul Rubin MD      ipratropium  2 puff Inhalation Q6H Saul Rubin MD      metoprolol succinate  100 mg Oral BID Saul Rubin MD      oxyCODONE  2.5 mg Oral Q4H PRN Saul Rubin MD      predniSONE  40 mg Oral Daily Maribell Aldana DO      rOPINIRole  2 mg Oral HS Saul Rubin MD      zolpidem  10 mg Oral HS PRN Saul  MD Caryn          Today, Patient Was Seen By: Kyle Brunner, MD    **Please Note: This note may have been constructed using a voice recognition system.**

## 2024-05-26 NOTE — ASSESSMENT & PLAN NOTE
Assessment:  Patient with a history of chronic hyponatremia around 128-134      Plan:  Monitor BMP and follow-up outpatient

## 2024-05-26 NOTE — ASSESSMENT & PLAN NOTE
>>ASSESSMENT AND PLAN FOR ESSENTIAL HYPERTENSION WRITTEN ON 5/26/2024  9:33 AM BY KYLE BRUNNER, MD    Assessment:  Home Meds: dilitiazem, metoprolol, lasix  /73 on admission    Plan:  Continue home medications  Outpatient follow-up with PCP at time of discharge

## 2024-05-26 NOTE — ASSESSMENT & PLAN NOTE
Wt Readings from Last 3 Encounters:   05/26/24 72.8 kg (160 lb 7.9 oz)       Assessment:  Not currently in exacerbation  Appears euvolemic on exam  No complaints of SOB or CP.  Home med is 20 mg of oral Lasix daily    Plan:  Continue home medications  Pulmonology consult recommended per cardiology in setting of cough/dyspnea despite euvolemia   Patient received another dose of Lasix 20 mg IV 5/25/24  Flecainide reduced to 100 mg BID in setting of PAF  Discuss resuming patient's home diuretic regimen of 20 mg Lasix orally

## 2024-05-26 NOTE — DISCHARGE INSTR - AVS FIRST PAGE
Dear Katiuska Che,     It was our pleasure to care for you here at [facility].  It is our hope that we were always able to exceed your expectations for your care during your stay.  You were hospitalized due to fall and cared for on the [facility] 3rd floor by Kyle Brunner, MD under the service of Katie Bernardo MD with the [facility] who covers for your primary care physician (PCP), Jessee Roldan MD. If you have any questions or concerns related to this hospitalization, you may contact us at [phone]. A nurse will call you within a few days to answer any additional questions that may arise after your discharge. We recommend that you follow up with your PCP for medication refills. Please note the following instructions / recommendations:       STOP/ADJUST taking -   Please decrease the flecainide tablet to 100 mg by mouth 2 times a day    START taking -  Zetia 10 mg tablet by mouth daily at bedtime  Lasix 20 mg tablet by mouth daily  Tessalon Perles 100 mg by mouth 3 times a day as needed for cough  Prednisone - Take 3 tablets (30 mg total) by mouth daily for 3 days, THEN 2 tablets (20 mg total) daily for 3 days, THEN 1 tablet (10 mg total) daily for 3 days. Do not start before May 28, 2024.    Testing Required after Discharge -   Basic Metabolic Panel in 1 week   Please follow up with your primary care provider to order these tests    Important follow up information -   Please schedule an appointment with your primary care provider as soon as possible    Other Instructions -   Please follow-up with your cardiologist    Please review your entire after visit summary including medication list, appointments, activity, diet, pertinent wound care, and any additional recommendations from your care team.    We wish you well.    Sincerely,     Kyle Brunner, MD

## 2024-05-26 NOTE — ASSESSMENT & PLAN NOTE
Status post PPM.  Flecainide dosing decreased to 100 twice a day.  Continue diltiazem 120 mg daily and metoprolol 100 mg twice a day  Anticoagulated with Eliquis 5 mg twice a day

## 2024-05-26 NOTE — ASSESSMENT & PLAN NOTE
>>ASSESSMENT AND PLAN FOR HYPERLIPIDEMIA WRITTEN ON 5/26/2024 11:35 AM BY KYLE BRUNNER, MD    Continue ezetimibe

## 2024-05-27 VITALS
SYSTOLIC BLOOD PRESSURE: 145 MMHG | HEART RATE: 67 BPM | BODY MASS INDEX: 29.29 KG/M2 | WEIGHT: 159.17 LBS | DIASTOLIC BLOOD PRESSURE: 71 MMHG | OXYGEN SATURATION: 97 % | HEIGHT: 62 IN | BODY MASS INDEX: 29.29 KG/M2 | DIASTOLIC BLOOD PRESSURE: 71 MMHG | HEART RATE: 67 BPM | HEIGHT: 62 IN | RESPIRATION RATE: 18 BRPM | RESPIRATION RATE: 18 BRPM | OXYGEN SATURATION: 97 % | TEMPERATURE: 98.1 F | WEIGHT: 159.17 LBS | SYSTOLIC BLOOD PRESSURE: 145 MMHG | TEMPERATURE: 98.1 F

## 2024-05-27 LAB
ANION GAP SERPL CALCULATED.3IONS-SCNC: 7 MMOL/L (ref 4–13)
ANION GAP SERPL CALCULATED.3IONS-SCNC: 7 MMOL/L (ref 4–13)
ATRIAL RATE: 111 BPM
ATRIAL RATE: 118 BPM
ATRIAL RATE: 61 BPM
ATRIAL RATE: 61 BPM
BUN SERPL-MCNC: 22 MG/DL (ref 5–25)
BUN SERPL-MCNC: 22 MG/DL (ref 5–25)
CALCIUM SERPL-MCNC: 9 MG/DL (ref 8.4–10.2)
CALCIUM SERPL-MCNC: 9 MG/DL (ref 8.4–10.2)
CHLORIDE SERPL-SCNC: 96 MMOL/L (ref 96–108)
CHLORIDE SERPL-SCNC: 96 MMOL/L (ref 96–108)
CO2 SERPL-SCNC: 26 MMOL/L (ref 21–32)
CO2 SERPL-SCNC: 26 MMOL/L (ref 21–32)
CREAT SERPL-MCNC: 0.78 MG/DL (ref 0.6–1.3)
CREAT SERPL-MCNC: 0.78 MG/DL (ref 0.6–1.3)
GFR SERPL CREATININE-BSD FRML MDRD: 72 ML/MIN/1.73SQ M
GFR SERPL CREATININE-BSD FRML MDRD: 72 ML/MIN/1.73SQ M
GLUCOSE SERPL-MCNC: 156 MG/DL (ref 65–140)
GLUCOSE SERPL-MCNC: 156 MG/DL (ref 65–140)
POTASSIUM SERPL-SCNC: 3.9 MMOL/L (ref 3.5–5.3)
POTASSIUM SERPL-SCNC: 3.9 MMOL/L (ref 3.5–5.3)
PR INTERVAL: 232 MS
PR INTERVAL: 232 MS
QRS AXIS: -64 DEGREES
QRS AXIS: -66 DEGREES
QRS AXIS: -66 DEGREES
QRS AXIS: -86 DEGREES
QRSD INTERVAL: 180 MS
QRSD INTERVAL: 182 MS
QRSD INTERVAL: 182 MS
QRSD INTERVAL: 194 MS
QT INTERVAL: 436 MS
QT INTERVAL: 476 MS
QT INTERVAL: 546 MS
QT INTERVAL: 546 MS
QTC INTERVAL: 549 MS
QTC INTERVAL: 549 MS
QTC INTERVAL: 576 MS
QTC INTERVAL: 667 MS
SODIUM SERPL-SCNC: 129 MMOL/L (ref 135–147)
SODIUM SERPL-SCNC: 129 MMOL/L (ref 135–147)
T WAVE AXIS: 104 DEGREES
T WAVE AXIS: 104 DEGREES
T WAVE AXIS: 105 DEGREES
T WAVE AXIS: 99 DEGREES
VENTRICULAR RATE: 105 BPM
VENTRICULAR RATE: 118 BPM
VENTRICULAR RATE: 61 BPM
VENTRICULAR RATE: 61 BPM

## 2024-05-27 PROCEDURE — 99232 SBSQ HOSP IP/OBS MODERATE 35: CPT | Performed by: INTERNAL MEDICINE

## 2024-05-27 PROCEDURE — 97116 GAIT TRAINING THERAPY: CPT

## 2024-05-27 PROCEDURE — 93010 ELECTROCARDIOGRAM REPORT: CPT | Performed by: INTERNAL MEDICINE

## 2024-05-27 PROCEDURE — 99239 HOSP IP/OBS DSCHRG MGMT >30: CPT | Performed by: INTERNAL MEDICINE

## 2024-05-27 PROCEDURE — 97530 THERAPEUTIC ACTIVITIES: CPT

## 2024-05-27 PROCEDURE — 80048 BASIC METABOLIC PNL TOTAL CA: CPT

## 2024-05-27 PROCEDURE — 94760 N-INVAS EAR/PLS OXIMETRY 1: CPT

## 2024-05-27 PROCEDURE — 94640 AIRWAY INHALATION TREATMENT: CPT

## 2024-05-27 PROCEDURE — 97110 THERAPEUTIC EXERCISES: CPT

## 2024-05-27 PROCEDURE — 97166 OT EVAL MOD COMPLEX 45 MIN: CPT

## 2024-05-27 RX ORDER — PREDNISONE 20 MG/1
40 TABLET ORAL DAILY
Qty: 4 TABLET | Refills: 0 | Status: CANCELLED | OUTPATIENT
Start: 2024-05-29 | End: 2024-05-31

## 2024-05-27 RX ORDER — FLECAINIDE ACETATE 100 MG/1
100 TABLET ORAL 2 TIMES DAILY
Qty: 60 TABLET | Refills: 0 | Status: SHIPPED | OUTPATIENT
Start: 2024-05-27

## 2024-05-27 RX ORDER — POTASSIUM CHLORIDE 750 MG/1
10 TABLET, EXTENDED RELEASE ORAL ONCE
Status: COMPLETED | OUTPATIENT
Start: 2024-05-27 | End: 2024-05-27

## 2024-05-27 RX ORDER — FUROSEMIDE 20 MG/1
20 TABLET ORAL DAILY
Qty: 30 TABLET | Refills: 0 | Status: SHIPPED | OUTPATIENT
Start: 2024-05-27

## 2024-05-27 RX ORDER — BENZONATATE 100 MG/1
100 CAPSULE ORAL 3 TIMES DAILY PRN
Qty: 20 CAPSULE | Refills: 0 | Status: SHIPPED | OUTPATIENT
Start: 2024-05-27

## 2024-05-27 RX ORDER — LEVALBUTEROL INHALATION SOLUTION 1.25 MG/3ML
1.25 SOLUTION RESPIRATORY (INHALATION) ONCE
Status: COMPLETED | OUTPATIENT
Start: 2024-05-27 | End: 2024-05-27

## 2024-05-27 RX ORDER — PREDNISONE 10 MG/1
TABLET ORAL
Qty: 18 TABLET | Refills: 0 | Status: SHIPPED | OUTPATIENT
Start: 2024-05-28 | End: 2024-06-06

## 2024-05-27 RX ORDER — EZETIMIBE 10 MG/1
10 TABLET ORAL
Qty: 30 TABLET | Refills: 0 | Status: SHIPPED | OUTPATIENT
Start: 2024-05-27

## 2024-05-27 RX ADMIN — IPRATROPIUM BROMIDE 2 PUFF: 17 AEROSOL, METERED RESPIRATORY (INHALATION) at 05:01

## 2024-05-27 RX ADMIN — OXYCODONE HYDROCHLORIDE AND ACETAMINOPHEN 500 MG: 500 TABLET ORAL at 09:29

## 2024-05-27 RX ADMIN — FUROSEMIDE 20 MG: 20 TABLET ORAL at 09:29

## 2024-05-27 RX ADMIN — APIXABAN 5 MG: 5 TABLET, FILM COATED ORAL at 09:29

## 2024-05-27 RX ADMIN — LEVALBUTEROL HYDROCHLORIDE 1.25 MG: 1.25 SOLUTION RESPIRATORY (INHALATION) at 13:35

## 2024-05-27 RX ADMIN — ACETAMINOPHEN 975 MG: 325 TABLET, FILM COATED ORAL at 09:29

## 2024-05-27 RX ADMIN — DILTIAZEM HYDROCHLORIDE 120 MG: 120 CAPSULE, COATED, EXTENDED RELEASE ORAL at 09:29

## 2024-05-27 RX ADMIN — POTASSIUM CHLORIDE 10 MEQ: 750 TABLET, EXTENDED RELEASE ORAL at 06:20

## 2024-05-27 RX ADMIN — PREDNISONE 40 MG: 20 TABLET ORAL at 09:29

## 2024-05-27 RX ADMIN — IPRATROPIUM BROMIDE 2 PUFF: 17 AEROSOL, METERED RESPIRATORY (INHALATION) at 14:16

## 2024-05-27 RX ADMIN — METOPROLOL SUCCINATE 100 MG: 100 TABLET, EXTENDED RELEASE ORAL at 09:29

## 2024-05-27 RX ADMIN — FLECAINIDE ACETATE 100 MG: 50 TABLET ORAL at 09:29

## 2024-05-27 NOTE — PLAN OF CARE
Problem: Nutrition/Hydration-ADULT  Goal: Nutrient/Hydration intake appropriate for improving, restoring or maintaining nutritional needs  Description: Monitor and assess patient's nutrition/hydration status for malnutrition. Collaborate with interdisciplinary team and initiate plan and interventions as ordered.  Monitor patient's weight and dietary intake as ordered or per policy. Utilize nutrition screening tool and intervene as necessary. Determine patient's food preferences and provide high-protein, high-caloric foods as appropriate.     INTERVENTIONS:  - Monitor oral intake, urinary output, labs, and treatment plans  - Assess nutrition and hydration status and recommend course of action  - Evaluate amount of meals eaten  - Assist patient with eating if necessary   - Allow adequate time for meals  - Recommend/ encourage appropriate diets, oral nutritional supplements, and vitamin/mineral supplements  - Order, calculate, and assess calorie counts as needed  - Recommend, monitor, and adjust tube feedings and TPN/PPN based on assessed needs  - Assess need for intravenous fluids  - Provide specific nutrition/hydration education as appropriate  - Include patient/family/caregiver in decisions related to nutrition  5/27/2024 1457 by Shalonda Mahajan RN  Outcome: Adequate for Discharge  5/27/2024 1133 by Shalonda Mahajan RN  Outcome: Progressing     Problem: PAIN - ADULT  Goal: Verbalizes/displays adequate comfort level or baseline comfort level  Description: Interventions:  - Encourage patient to monitor pain and request assistance  - Assess pain using appropriate pain scale  - Administer analgesics based on type and severity of pain and evaluate response  - Implement non-pharmacological measures as appropriate and evaluate response  - Consider cultural and social influences on pain and pain management  - Notify physician/advanced practitioner if interventions unsuccessful or patient reports new pain  5/27/2024  1457 by Shalonda Mahajan RN  Outcome: Adequate for Discharge  5/27/2024 1133 by Shalonda Mahajan RN  Outcome: Progressing     Problem: INFECTION - ADULT  Goal: Absence or prevention of progression during hospitalization  Description: INTERVENTIONS:  - Assess and monitor for signs and symptoms of infection  - Monitor lab/diagnostic results  - Monitor all insertion sites, i.e. indwelling lines, tubes, and drains  - Monitor endotracheal if appropriate and nasal secretions for changes in amount and color  - [place] appropriate cooling/warming therapies per order  - Administer medications as ordered  - Instruct and encourage patient and family to use good hand hygiene technique  - Identify and instruct in appropriate isolation precautions for identified infection/condition  5/27/2024 1457 by Shalonda Mahajan RN  Outcome: Adequate for Discharge  5/27/2024 1133 by Shalonda Mahajan RN  Outcome: Progressing  Goal: Absence of fever/infection during neutropenic period  Description: INTERVENTIONS:  - Monitor WBC    5/27/2024 1457 by Shalonda Mahajan RN  Outcome: Adequate for Discharge  5/27/2024 1133 by Shalonda Mahajan RN  Outcome: Progressing     Problem: SAFETY ADULT  Goal: Patient will remain free of falls  Description: INTERVENTIONS:  - Educate patient/family on patient safety including physical limitations  - Instruct patient to call for assistance with activity   - Consult OT/PT to assist with strengthening/mobility   - Keep Call bell within reach  - Keep bed low and locked with side rails adjusted as appropriate  - Keep care items and personal belongings within reach  - Initiate and maintain comfort rounds  - Make Fall Risk Sign visible to staff  - Apply yellow socks and bracelet for high fall risk patients  - Consider moving patient to room near nurses station  5/27/2024 1457 by Shalonda Mahajan RN  Outcome: Adequate for Discharge  5/27/2024 1133 by Shalonda Mahajan RN  Outcome:  Progressing  Goal: Maintain or return to baseline ADL function  Description: INTERVENTIONS:  -  Assess patient's ability to carry out ADLs; assess patient's baseline for ADL function and identify physical deficits which impact ability to perform ADLs (bathing, care of mouth/teeth, toileting, grooming, dressing, etc.)  - Assess/evaluate cause of self-care deficits   - Assess range of motion  - Assess patient's mobility; develop plan if impaired  - Assess patient's need for assistive devices and provide as appropriate  - Encourage maximum independence but intervene and supervise when necessary  - Involve family in performance of ADLs  - Assess for home care needs following discharge   - Consider OT consult to assist with ADL evaluation and planning for discharge  - Provide patient education as appropriate  5/27/2024 1457 by Shalonda Mahajan RN  Outcome: Adequate for Discharge  5/27/2024 1133 by Shalonda Mahajan RN  Outcome: Progressing  Goal: Maintains/Returns to pre admission functional level  Description: INTERVENTIONS:  - Perform AM-PAC 6 Click Basic Mobility/ Daily Activity assessment daily.  - Set and communicate daily mobility goal to care team and patient/family/caregiver.   - Collaborate with rehabilitation services on mobility goals if consulted  - Out of bed for toileting  - Record patient progress and toleration of activity level   5/27/2024 1457 by Shalonda Mahajan RN  Outcome: Adequate for Discharge  5/27/2024 1133 by Shalonda Mahajan RN  Outcome: Progressing     Problem: DISCHARGE PLANNING  Goal: Discharge to home or other facility with appropriate resources  Description: INTERVENTIONS:  - Identify barriers to discharge w/patient and caregiver  - Arrange for needed discharge resources and transportation as appropriate  - Identify discharge learning needs (meds, wound care, etc.)  - Arrange for interpretive services to assist at discharge as needed  - Refer to Case Management Department for  coordinating discharge planning if the patient needs post-hospital services based on physician/advanced practitioner order or complex needs related to functional status, cognitive ability, or social support system  5/27/2024 1457 by Shalonda Mahajan RN  Outcome: Adequate for Discharge  5/27/2024 1133 by Shalonda Mahajan RN  Outcome: Progressing     Problem: Knowledge Deficit  Goal: Patient/family/caregiver demonstrates understanding of disease process, treatment plan, medications, and discharge instructions  Description: Complete learning assessment and assess knowledge base.  Interventions:  - Provide teaching at level of understanding  - Provide teaching via preferred learning methods  5/27/2024 1457 by Shalonda Mahajan RN  Outcome: Adequate for Discharge  5/27/2024 1133 by Shalonda Mahajan RN  Outcome: Progressing

## 2024-05-27 NOTE — RESPIRATORY THERAPY NOTE
05/27/24 1208   Respiratory Assessment   Resp Comments Attempted to see patient for one time xopenex order. Patient with therapy told to come back.

## 2024-05-27 NOTE — OCCUPATIONAL THERAPY NOTE
Occupational Therapy Evaluation     Patient Name: Katiuska Che  Today's Date: 5/27/2024  Problem List  Principal Problem:    Falls  Active Problems:    Ambulatory dysfunction    Hyponatremia    Hyperlipidemia    Chronic diastolic CHF (congestive heart failure) (HCC)    Essential hypertension    Paroxysmal atrial fibrillation (HCC)    Bronchiectasis (HCC)    Past Medical History  Past Medical History:   Diagnosis Date    Atrial fibrillation with RVR (HCC)     Basal cell carcinoma     Bronchiectasis without complication (HCC)     Chronic diastolic CHF (congestive heart failure) (HCC)     Fibromyalgia     GERD (gastroesophageal reflux disease)     Hyperlipidemia     Hypertension     Hyponatremia     Malignant neoplasm of thyroid gland (HCC)     Meningioma (HCC)     MGUS (monoclonal gammopathy of unknown significance)      Past Surgical History  Past Surgical History:   Procedure Laterality Date    CATARACT EXTRACTION Bilateral     HYSTERECTOMY      OOPHORECTOMY      THYROID LOBECTOMY Left          05/27/24 1419   OT Last Visit   OT Visit Date 05/27/24   Note Type   Note type Evaluation   Pain Assessment   Pain Assessment Tool 0-10   Pain Score No Pain   Restrictions/Precautions   Weight Bearing Precautions Per Order No   Other Precautions Chair Alarm;Bed Alarm;Telemetry   Home Living   Type of Home House   Home Layout One level;Performs ADLs on one level;Able to live on main level with bedroom/bathroom;Ramped entrance  (2 MAMIE in the front vs ramp, maintains FFSU)   Bathroom Shower/Tub Walk-in shower   Bathroom Toilet Raised   Bathroom Equipment Grab bars in shower;Shower chair;Commode;Grab bars around toilet  (does not use BSC at baseline)   Bathroom Accessibility Accessible   Home Equipment Walker;Cane;Grab bars;Life alert   Additional Comments Independently ambulating PTA, no use of AD   Prior Function   Level of [place] Independent with ADLs;Independent with functional mobility;Needs assistance with IADLS  "  Lives With Alone   Receives Help From Family  (daughter and YAMILEX live nextdoor)   IADLs Independent with driving;Independent with meal prep;Family/Friend/Other provides medication management  (daughter assists w/ med managment)   Falls in the last 6 months 1 to 4  (3 falls leading to current admission - used life alert)   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 (+) falls, (+) driving. Lives in a 1SH, ramped entrance.   Reciprocal Relationships Supportive family   Service to Others Retired   Intrinsic Gratification Reading, gardening   General   Additional Pertinent History PT admit w/ recurrent falls, (+) headstrike. CT C-spine negative for acute traumatic injuries. Incidental finding of enlarged right thyroid containing a hypodense nodule measuring 2.2 cm.   Family/Caregiver Present Yes  (daughter arrived at the start of the session ~ all concerns/questions addressed.)   Additional General Comments PMH of atrial fibrillation with RVR and biventricular pacemaker, essential hypertension, allergic rhinitis, fibromyalgia, hyperlipidemia   Subjective   Subjective \"I am usually very active\"   ADL   Where Assessed Edge of bed   Eating Assistance 7  Independent   Grooming Assistance 6  Modified Independent   UB Bathing Assistance 6  Modified Independent   LB Bathing Assistance 5  Supervision/Setup   UB Dressing Assistance 6  Modified independent   LB Dressing Assistance 5  Supervision/Setup   LB Dressing Deficit Don/doff R shoe;Don/doff L shoe  (sitting at the EOB)   Toileting Assistance  5  Supervision/Setup   Additional Comments Unable to formally assess bathing, UB dressing, grooming or toileting at time of eval. The above levels of assistance are anticipated based on functional performance deficits with use of clinical judgement.   Bed " Mobility   Supine to Sit 7  Independent   Sit to Supine Unable to assess   Additional items Comment  (pt OOB to recliner chair)   Additional Comments Denies dizziness/lightheadedness w/ postural changes   Transfers   Sit to Stand 5  Supervision   Additional items Verbal cues   Stand to Sit 5  Supervision   Additional items Verbal cues   Stand pivot 5  Supervision   Additional items Verbal cues  (use of SPC)   Toilet transfer 5  Supervision   Additional items Verbal cues;Standard toilet  (use of R grab bar)   Additional Comments Ties robe independently while standing, no LOB or instability noted.   Functional Mobility   Functional Mobility 6  Modified independent   Additional Comments functional household distance in the room and in the hallway w/ use of SPC. No c/o offered at this time.   Additional items SPC   Balance   Static Sitting Good   Dynamic Sitting Good   Static Standing Fair +   Dynamic Standing Fair   Activity Tolerance   Activity Tolerance Patient tolerated treatment well   Medical Staff Made Aware Spoke with PT   Nurse Made Aware Spoke with RN Tyrel pre/post   RUE Assessment   RUE Assessment WFL   LUE Assessment   LUE Assessment WFL   Hand Function   Gross Motor Coordination Functional   Fine Motor Coordination Functional   Vision-Basic Assessment   Current Vision Wears glasses all the time   Cognition   Overall Cognitive Status WFL   Arousal/Participation Alert;Responsive;Cooperative   Attention Within functional limits   Orientation Level Oriented X4   Memory Within functional limits   Following Commands Follows one step commands without difficulty   Comments Pt ID via wristband, name and . Pt is pleasent and cooperative, agreeable to partcipate in therapy.   Assessment   Limitation Decreased ADL status;Decreased endurance;Decreased self-care trans;Decreased high-level ADLs   Prognosis Good   Assessment Patient is a 78 y.o. female seen for OT evaluation at [facility] following  "admission on 5/24/2024  s/p Falls. Please see above for comprehensive list of comorbidities and significant PMHx impacting functional performance.  At baseline, independent w/ ADLs, most IADLs, and functional mobility w/o use of AD. (+) falls, (+) driving. Lives in a 1SH, ramped entrance. Upon initial evaluation, pt appears to be performing below baseline functional status. Pt requires GÓMEZ for UB ADLs, supervision for LB ADLs, independent for bed mobility, MOD I for transfers and MOD I for functional mobility household distance with SPC. The AM-PAC & Barthel Index outcome tools were used to assist in determining pt safety w/self care /mobility and appropriate d/c recommendations, see above for score. Occupational performance is affected by the following deficits: endurance  and decreased activity tolerance . Personal/Environmental factors impacting D/C include: (+) Hx of falls  and decreased social/family support within the home. Supporting factors include: able to maintain FFSU, accessible home environment, support system available, and attitude towards recovery Patient would benefit from OT services within the acute care setting to maximize level of functional independence in the following areas fall prevention , self-care transfers, functional mobility, and ADLs.  From OT standpoint, recommendation at time of D/C would be level III (minimum resource intensity).   Goals   Patient Goals \"to go home and do everything I was doing before this\"   LTG Time Frame 10-14   Long Term Goal See below   Plan   Treatment Interventions ADL retraining;Functional transfer training;Endurance training;Patient/family training;Equipment evaluation/education;Compensatory technique education;Energy conservation   Goal Expiration Date 06/06/24   OT Treatment Day 0   OT Frequency 2-3x/wk   Discharge Recommendation   Rehab Resource Intensity Level, OT III (Minimum Resource Intensity)   Additional Comments  The patient's raw score on the " AM-PAC Daily Activity Inpatient Short Form is 22. A raw score of greater than or equal to 19 suggests the patient may benefit from discharge to home. Please refer to the recommendation of the Occupational Therapist for safe discharge planning.   AM-PAC Daily Activity Inpatient   Lower Body Dressing 3   Bathing 3   Toileting 4   Upper Body Dressing 4   Grooming 4   Eating 4   Daily Activity Raw Score 22   Daily Activity Standardized Score (Calc for Raw Score >=11) 47.1   AM-PAC Applied Cognition Inpatient   Following a Speech/Presentation 4   Understanding Ordinary Conversation 4   Taking Medications 3   Remembering Where Things Are Placed or Put Away 4   Remembering List of 4-5 Errands 4   Taking Care of Complicated Tasks 3   Applied Cognition Raw Score 22   Applied Cognition Standardized Score 47.83   Barthel Index   Feeding 10   Bathing 0   Grooming Score 5   Dressing Score 10   Bladder Score 10   Bowels Score 10   Toilet Use Score 10   Transfers (Bed/Chair) Score 10   Mobility (Level Surface) Score 10   Stairs Score 5   Barthel Index Score 80   End of Consult   Education Provided Yes;Family or social support of family present for education by provider   Patient Position at End of Consult Bedside chair;Bed/Chair alarm activated;All needs within reach   Nurse Communication Nurse aware of consult   End of Consult Comments Pt reports performing close to/or at baseline w/ all self-care tasks and functional mobility. Dtr present for conversation, additionally denying any concerns or questions. Encouraged pt to use BSC vs RW at night, when in a rush to use the bathroom, pt verbalized understanding.       Goals established on initial evaluation in order to achieve pt's goal of going home and maintaing independence.      Pt will complete LB ADLs Independent  for increased ADL independence within 10 days.     Pt will complete toileting Independent  with use of DME for increased ADL independence within 10 days.      Pt will  demonstrate standing tolerance of 15-20 min with Independent  and use of LRAD for increased activity tolerance during ADL/IADL tasks within 10 days.     Pt will demonstrate proper body mechanics and fall prevention strategies during 100% of tx sessions for increased safety awareness during ADL/IADLs    Pt will improve functional activity tolerance to 35 mins of sustained functional tasks to increase participation in basic self-care tasks and minimize assistance level.     Pt will verbalize and demonstrate understanding of B UE HEP program increase strength and endurance during self care transfers within 10 days.     Trudi Osullivan MS OTR/L   [place] Licensure# [id number]

## 2024-05-27 NOTE — PLAN OF CARE
Problem: Nutrition/Hydration-ADULT  Goal: Nutrient/Hydration intake appropriate for improving, restoring or maintaining nutritional needs  Description: Monitor and assess patient's nutrition/hydration status for malnutrition. Collaborate with interdisciplinary team and initiate plan and interventions as ordered.  Monitor patient's weight and dietary intake as ordered or per policy. Utilize nutrition screening tool and intervene as necessary. Determine patient's food preferences and provide high-protein, high-caloric foods as appropriate.     INTERVENTIONS:  - Monitor oral intake, urinary output, labs, and treatment plans  - Assess nutrition and hydration status and recommend course of action  - Evaluate amount of meals eaten  - Assist patient with eating if necessary   - Allow adequate time for meals  - Recommend/ encourage appropriate diets, oral nutritional supplements, and vitamin/mineral supplements  - Order, calculate, and assess calorie counts as needed  - Recommend, monitor, and adjust tube feedings and TPN/PPN based on assessed needs  - Assess need for intravenous fluids  - Provide specific nutrition/hydration education as appropriate  - Include patient/family/caregiver in decisions related to nutrition  Outcome: Progressing     Problem: PAIN - ADULT  Goal: Verbalizes/displays adequate comfort level or baseline comfort level  Description: Interventions:  - Encourage patient to monitor pain and request assistance  - Assess pain using appropriate pain scale  - Administer analgesics based on type and severity of pain and evaluate response  - Implement non-pharmacological measures as appropriate and evaluate response  - Consider cultural and social influences on pain and pain management  - Notify physician/advanced practitioner if interventions unsuccessful or patient reports new pain  Outcome: Progressing     Problem: INFECTION - ADULT  Goal: Absence or prevention of progression during  hospitalization  Description: INTERVENTIONS:  - Assess and monitor for signs and symptoms of infection  - Monitor lab/diagnostic results  - Monitor all insertion sites, i.e. indwelling lines, tubes, and drains  - Monitor endotracheal if appropriate and nasal secretions for changes in amount and color  - [place] appropriate cooling/warming therapies per order  - Administer medications as ordered  - Instruct and encourage patient and family to use good hand hygiene technique  - Identify and instruct in appropriate isolation precautions for identified infection/condition  Outcome: Progressing  Goal: Absence of fever/infection during neutropenic period  Description: INTERVENTIONS:  - Monitor WBC    Outcome: Progressing     Problem: SAFETY ADULT  Goal: Patient will remain free of falls  Description: INTERVENTIONS:  - Educate patient/family on patient safety including physical limitations  - Instruct patient to call for assistance with activity   - Consult OT/PT to assist with strengthening/mobility   - Keep Call bell within reach  - Keep bed low and locked with side rails adjusted as appropriate  - Keep care items and personal belongings within reach  - Initiate and maintain comfort rounds  - Make Fall Risk Sign visible to staff  - Apply yellow socks and bracelet for high fall risk patients  - Consider moving patient to room near nurses station  Outcome: Progressing  Goal: Maintain or return to baseline ADL function  Description: INTERVENTIONS:  -  Assess patient's ability to carry out ADLs; assess patient's baseline for ADL function and identify physical deficits which impact ability to perform ADLs (bathing, care of mouth/teeth, toileting, grooming, dressing, etc.)  - Assess/evaluate cause of self-care deficits   - Assess range of motion  - Assess patient's mobility; develop plan if impaired  - Assess patient's need for assistive devices and provide as appropriate  - Encourage maximum independence but intervene and  supervise when necessary  - Involve family in performance of ADLs  - Assess for home care needs following discharge   - Consider OT consult to assist with ADL evaluation and planning for discharge  - Provide patient education as appropriate  Outcome: Progressing  Goal: Maintains/Returns to pre admission functional level  Description: INTERVENTIONS:  - Perform AM-PAC 6 Click Basic Mobility/ Daily Activity assessment daily.  - Set and communicate daily mobility goal to care team and patient/family/caregiver.   - Collaborate with rehabilitation services on mobility goals if consulted  - Out of bed for toileting  - Record patient progress and toleration of activity level   Outcome: Progressing     Problem: DISCHARGE PLANNING  Goal: Discharge to home or other facility with appropriate resources  Description: INTERVENTIONS:  - Identify barriers to discharge w/patient and caregiver  - Arrange for needed discharge resources and transportation as appropriate  - Identify discharge learning needs (meds, wound care, etc.)  - Arrange for interpretive services to assist at discharge as needed  - Refer to Case Management Department for coordinating discharge planning if the patient needs post-hospital services based on physician/advanced practitioner order or complex needs related to functional status, cognitive ability, or social support system  Outcome: Progressing     Problem: Knowledge Deficit  Goal: Patient/family/caregiver demonstrates understanding of disease process, treatment plan, medications, and discharge instructions  Description: Complete learning assessment and assess knowledge base.  Interventions:  - Provide teaching at level of understanding  - Provide teaching via preferred learning methods  Outcome: Progressing

## 2024-05-27 NOTE — PLAN OF CARE
Problem: OCCUPATIONAL THERAPY ADULT  Goal: Performs self-care activities at highest level of function for planned discharge setting.  See evaluation for individualized goals.  Description: Treatment Interventions: ADL retraining, Functional transfer training, Endurance training, Patient/family training, Equipment evaluation/education, Compensatory technique education, Energy conservation          See flowsheet documentation for full assessment, interventions and recommendations.   Note: Limitation: Decreased ADL status, Decreased endurance, Decreased self-care trans, Decreased high-level ADLs  Prognosis: Good  Assessment: Patient is a 78 y.o. female seen for OT evaluation at [facility] following admission on 5/24/2024  s/p Falls. Please see above for comprehensive list of comorbidities and significant PMHx impacting functional performance.  At baseline, independent w/ ADLs, most IADLs, and functional mobility w/o use of AD. (+) falls, (+) driving. Lives in a 1SH, ramped entrance. Upon initial evaluation, pt appears to be performing below baseline functional status. Pt requires GÓMEZ for UB ADLs, supervision for LB ADLs, independent for bed mobility, MOD I for transfers and MOD I for functional mobility household distance with SPC. The AM-PAC & Barthel Index outcome tools were used to assist in determining pt safety w/self care /mobility and appropriate d/c recommendations, see above for score. Occupational performance is affected by the following deficits: endurance  and decreased activity tolerance . Personal/Environmental factors impacting D/C include: (+) Hx of falls  and decreased social/family support within the home. Supporting factors include: able to maintain FFSU, accessible home environment, support system available, and attitude towards recovery Patient would benefit from OT services within the acute care setting to maximize level of functional independence in the following areas fall  prevention , self-care transfers, functional mobility, and ADLs.  From OT standpoint, recommendation at time of D/C would be level III (minimum resource intensity).     Rehab Resource Intensity Level, OT: III (Minimum Resource Intensity)     Trudi Osullivan MS OTR/L   [place] Licensure# [id number]

## 2024-05-27 NOTE — PHYSICAL THERAPY NOTE
PHYSICAL THERAPY NOTE          Patient Name: Katiuska Che  Today's Date: 24 1125   PT Last Visit   PT Visit Date 24   Note Type   Note Type Treatment   Pain Assessment   Pain Assessment Tool 0-10   Pain Score No Pain   Restrictions/Precautions   Other Precautions Chair Alarm;Bed Alarm;Telemetry;Fall Risk   General   Chart Reviewed Yes   Family/Caregiver Present No  (spoke with daughter via phone regarding DC plans, At this time DC to home is still reccomended)   Cognition   Overall Cognitive Status WFL   Arousal/Participation Alert;Responsive;Cooperative   Attention Within functional limits   Orientation Level Oriented X4   Memory Within functional limits   Following Commands Follows all commands and directions without difficulty   Comments Pt ID via  and wristband   Subjective   Subjective Pt supine in bed asleep, pleasant and agreeable for therapy session   Timed Up and Go   TUG Trial 1 (Seconds) 17.5 Seconds   TUG Trial 2 (Seconds) 17 Seconds   TUG Trial 3 (Seconds) 14.3 Seconds   TUG Average Score (Seconds) 16.27 Seconds   Bed Mobility   Rolling R 7  Independent   Rolling L 7  Independent   Supine to Sit 7  Independent   Sit to Supine 7  Independent   Transfers   Sit to Stand 5  Supervision   Additional items Bedrails;Assist x 1   Stand to Sit 5  Supervision   Additional items Assist x 1;Bedrails   Stand pivot 5  Supervision   Additional items Assist x 1   Ambulation/Elevation   Gait pattern Forward Flexion;Decreased foot clearance;Foward flexed;Step through pattern   Gait Assistance 5  Supervision   Additional items Assist x 1   Assistive Device SPC   Distance 250x2   Balance   Static Sitting Good   Dynamic Sitting Good   Static Standing Fair +   Dynamic Standing Fair +   Ambulatory Fair   Activity Tolerance   Activity Tolerance Patient limited by fatigue   Nurse Made Aware Spoke with EMILIANO Sarah Dr.  Az as well   Exercises   Glute Sets Sitting;25 reps;AROM;Bilateral   Knee AROM Short Arc Quad Sitting;25 reps;AROM;Bilateral   Ankle Pumps Sitting;25 reps;AROM;Bilateral   Marching Sitting;25 reps;AROM;Bilateral   Assessment   Prognosis Good   Problem List Decreased strength;Decreased endurance;Impaired balance;Decreased mobility;Decreased safety awareness;Impaired sensation   Assessment Pt asleep in bed upon arival, esaily arrousable and pleasant for therapy session. Pt able to complete all bed mobility at an independent level. Pt able to amb 250'x2 with SPC and DS, no LOB noted. TUG completed with an average of 16 seconds, moderate fall risk. Pt noted she typically wears sneakers and no slippers, could have limited her times. Pt able to complete all ther ex with no complication. Toilted indpendently with no complication. Pt seated in recliner chair to end session with call bell in reach an all needs met. Pt will benefit from continued skilled PT to improve over all strenght and endurence.   Goals   Patient Goals I am ready to go home   STG Expiration Date 06/03/24   Short Term Goal #1 pt will: Increase bilateral LE strength 1/2 grade to facilitate independent mobility, Perform bed mobility independently to increase level of independence, Perform all transfers independently to improve independence, Ambulate 300 ft. with least restrictive assistive device independently w/o LOB to improve functional independence, Navigate 2 stair(s) independently with unilateral handrail to facilitate return to previous living environment, Increase ambulatory balance 1 grade to decrease risk for falls, Complete exercise program independently to increase strength and endurance, Tolerate 3 hr OOB to faciliate upright tolerance, Improve Barthel Index score to 95 or greater to facilitate independence, and Complete Timed Up and Go or Comfortable Gait Speed to further assess mobility and monitor progress   PT Treatment Day 1   Plan    Treatment/Interventions Functional transfer training;LE strengthening/ROM;Elevations;Therapeutic exercise;Endurance training;Bed mobility;Gait training   Progress Progressing toward goals   PT Frequency 3-5x/wk   Discharge Recommendation   Rehab Resource Intensity Level, PT III (Minimum Resource Intensity)   AM-PAC Basic Mobility Inpatient   Turning in Flat Bed Without Bedrails 4   Lying on Back to Sitting on Edge of Flat Bed Without Bedrails 4   Moving Bed to Chair 4   Standing Up From Chair Using Arms 4   Walk in Room 4   Climb 3-5 Stairs With Railing 3   Basic Mobility Inpatient Raw Score 23   Basic Mobility Standardized Score 50.88   [facility] Highest Level Of Mobility   JH-HLM Goal 7: Walk 25 feet or more   JH-HLM Achieved 8: Walk 250 feet ot more   Education   Education Provided Home exercise program   Patient Demonstrates verbal understanding   End of Consult   Patient Position at End of Consult Bed/Chair alarm activated;Bedside chair;All needs within reach       The patient's AM-PAC Basic Mobility Inpatient Short Form Raw Score is 20, Standardized Score is 50.88. A standardized score greater than 38.32 (raw score of 16) suggests the patient may benefit from discharge to home which may not coincide with above PT recommendations. However please refer to therapist recommendation for discharge planning given other factors that may influence destination    Ruth Shaver, PTA

## 2024-05-27 NOTE — PROGRESS NOTES
"Progress Note - Pulmonary   Katiuska Che 78 y.o. female MRN: [id number]  Unit/Bed#: S -01 Encounter: [id number]    Assessment:  Acute hypoxemic respiratory failure secondary to acute pulmonary edema  Mild bronchiectasis with possible exacerbation  Paroxysmal A-fib with recent hospitalization    Plan:  Acute hypoxemic respiratory failure in the setting of acute pulmonary edema, possible mild bronchiectasis exacerbation  She has been transitioned to oral diuresis and cardiology has signed off  Overall feels improved but still does have some cough, she is requesting a one-time nebulizer treatment  Would continue steroid taper, decrease to 30 mg, can decrease by 10 mg every 3 days  She is stable for discharge home from pulmonary standpoint and can follow-up with us in the office upon discharge    Subjective:   Patient is resting in bed.  She is overall feeling better from prior to admission.  Still with some residual cough.    Objective:     Vitals: Blood pressure 145/71, pulse 67, temperature 98.1 °F (36.7 °C), resp. rate 18, height 5' 2\" (1.575 m), weight 72.2 kg (159 lb 2.8 oz), SpO2 93%.,Body mass index is 29.11 kg/m².      Intake/Output Summary (Last 24 hours) at 5/27/2024 1219  Last data filed at 5/27/2024 1101  Gross per 24 hour   Intake 660 ml   Output 200 ml   Net 460 ml       Invasive Devices       Peripheral Intravenous Line  Duration             Peripheral IV 05/24/24 Left;Proximal;Ventral (anterior) Forearm 3 days                    Physical Exam: /71   Pulse 67   Temp 98.1 °F (36.7 °C)   Resp 18   Ht 5' 2\" (1.575 m)   Wt 72.2 kg (159 lb 2.8 oz)   SpO2 93%   BMI 29.11 kg/m²   General appearance: alert and oriented, in no acute distress  Head: Normocephalic, without obvious abnormality, atraumatic  Eyes: negative findings: conjunctivae and sclerae normal  Lungs: clear to auscultation bilaterally  Heart: regular rate and rhythm  Abdomen: normal findings: soft, non-tender  Extremities:  " "no edema  Skin:  warm and dry  Neurologic: Mental status: Alert, oriented, thought content appropriate     Labs: I have personally reviewed pertinent lab results., CBC: No results found for: \"WBC\", \"HGB\", \"HCT\", \"MCV\", \"PLT\", \"ADJUSTEDWBC\", \"RBC\", \"MCH\", \"MCHC\", \"RDW\", \"MPV\", \"NRBC\", CMP:   Lab Results   Component Value Date    SODIUM 129 (L) 05/27/2024    K 3.9 05/27/2024    CL 96 05/27/2024    CO2 26 05/27/2024    BUN 22 05/27/2024    CREATININE 0.78 05/27/2024    CALCIUM 9.0 05/27/2024    EGFR 72 05/27/2024     Imaging and other studies: I have personally reviewed pertinent reports.   and I have personally reviewed pertinent films in PACS    "

## 2024-05-28 ENCOUNTER — TRANSITIONAL CARE MANAGEMENT (OUTPATIENT)
Dept: FAMILY MEDICINE CLINIC | Facility: CLINIC | Age: 79
End: 2024-05-28

## 2024-05-28 NOTE — PROGRESS NOTES
Heart Failure Outpatient Progress Note - Farnaz Martin 78 y.o. female MRN: [id number]    @ Encounter: [id number]      Assessment/Plan:    Patient Active Problem List    Diagnosis Date Noted    Bilateral leg edema 05/06/2024    Left renal mass 04/22/2024    Chronic cough 01/25/2024    Bronchiectasis without complication (HCC) 01/25/2024    Multiple thyroid nodules 01/13/2024    Abnormal CT of the chest 08/04/2023    S/P revision of total knee, left 07/31/2023    Nonrheumatic mitral valve regurgitation 07/03/2023    Nonrheumatic aortic valve insufficiency 07/03/2023    Nonrheumatic tricuspid valve regurgitation 07/03/2023    Pulmonary hypertension (HCC) 06/29/2022    Hyponatremia 06/13/2022    Meningioma ([facility]) 05/13/2022    Chronic tension-type headache, not intractable 03/29/2022    Vasomotor rhinitis 12/16/2021    MGUS (monoclonal gammopathy of unknown significance) 06/28/2021    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    Tremors of nervous system 04/01/2021    Hx of diplopia 04/01/2021    S/P placement of cardiac pacemaker 03/24/2021    Current use of long term anticoagulation 02/04/2021    Malignant neoplasm of thyroid gland ([facility]) 01/11/2021    Chronic diastolic CHF (congestive heart failure) ([facility]) 01/03/2021    Chronic rhinitis 11/10/2020    Arthritis of both acromioclavicular joints 03/06/2020    Carpal tunnel syndrome on right 11/01/2019    Osteoarthritis of shoulder     TMJ syndrome 10/18/2017    Atrial fibrillation with rapid ventricular response (HCC) 04/19/2016    Essential hypertension 04/19/2016    Peripheral neuropathy 03/07/2016    Lumbar spondylosis 06/29/2015    Lumbar stenosis 06/29/2015    Restless legs syndrome 07/09/2014    Allergic rhinitis 04/01/2013    Osteoarthritis of knee 04/01/2013    Insomnia 01/04/2013    Osteopenia 11/26/2012    Fibromyalgia 10/16/2012    Hyperlipidemia 10/16/2012    Osteoarthrosis, hand 10/16/2012    Vitamin D deficiency 10/16/2012     Chronic HFpEF  -Recent  exacerbation possibly triggered by atrial arrhythmias + iatrogenic volume overload  -Currently back on her usual home dose of Lasix 20 mg daily and compensated  -She was advised she can take an extra 20 mg PRN for rapid weight gain or signs of fluid retention.     Weight at discharge 160 lbs, today, 160 lbs.        TTE 5/25/24: LVEF 55%, mod to severe MR, mild to mod TR, est RAP 10 mmHg,       PAF/flutter/atrial tachycardia- history of atrial fibrillation and flutter ablation 2022  Rhythm- flecainide 100 mg BID  Rate Diltiazem 120 mg twice daily, Toprol 100 mg twice daily  OAC Eliquis 5mg BID     Dual chamber Medtronic PPM  Interrogation 5/20/24: Afib, no other events.   Interrogation 3/21/24: AP-93%, -2%. ALL AVAILABLE LEAD PARAMETERS WITHIN NORMAL LIMITS. 5 AT/AFLUTTER EPISDOES MAX DURATION 14.6 MINS.   Chronic hyponatremia-sodium runs 128-134. Currently 129  QTc prolongation/torsades with sotalol    Bronchiectasis. Follows with pulm. Currently on steroid taper.   Falls- recurrent  H/O thyroid cancer  Renal mass. Currently undergoing workup    HPI:   78-year-old female with past medical history described above who presents for hospital follow-up with her daughter.  He was originally admitted on 5/20 at the [facility] with palpitations due to atrial tachycardia.  She was placed on flecainide 150 mg twice daily.  She then presented back to the [facility] after a fall at home.  Also felt to be volume overloaded and diagnosed with bronchiectasis.  He was treated with IV diuretics and steroids respectively.  Device interrogation showed her to be in normal sinus rhythm with no significant events.  She was discharged home on her usual dose of Lasix 20 mg daily.  Her flecainide dose was also reduced to 100 mg twice daily given concern for side effects on higher dosing. She is feeling well with exception of fatigue. Had a few break through palpitations since discharge but nothing sustained. Otherwise no other  concerns or symptoms. She will likely need surgical intervention of a renal mass over next few weeks to months and will need cardiac risk assessment.     Past Medical History:   Diagnosis Date    Actinic keratosis     last assessed - 06Jun2014    Arthritis     Atrial fibrillation with rapid ventricular response (HCC)     last assessed - 26Apr2016    Basal cell carcinoma     Benign colon polyp     last assessed - 27Apr2015    CHF (congestive heart failure) ([facility]) 06/2022    Disease of thyroid gland     Effusion of knee joint right     Resolved - 19Apr2016    Esophageal reflux     Fibromyalgia     last assessed - 27Dec2017    Fibromyalgia, primary     GERD (gastroesophageal reflux disease)     Hypertension     Palpitations     last assessed - 30Apr2013    Peroneal tendonitis, right     last assessed - 02Oct2013    Right lumbar radiculopathy 03/17/2016    Thyroid cancer ([facility])     Thyroid nodule     Trochanteric bursitis of left hip 03/09/2018       12 point ROS negative other than that stated in HPI    Allergies   Allergen Reactions    Sotalol Other (See Comments)     Prolonged QTc leading to torsades de pointes     Penicillins Other (See Comments)     As a child calcium deposit in the arm     Ace Inhibitors GI Intolerance     Did feel well on it    Omeprazole Abdominal Pain, Rash and Vomiting     stomach pain, vomiting, rash     .    Current Outpatient Medications:     acetylcysteine (MUCOMYST) 200 mg/mL nebulizer solution, Take 4 mL (800 mg total) by nebulization every 4 (four) hours, Disp: 180 mL, Rfl: 5    albuterol (ProAir HFA) 90 mcg/act inhaler, Inhale 2 puffs every 6 (six) hours as needed for wheezing, Disp: 8.5 g, Rfl: 3    apixaban (ELIQUIS) 5 mg, Take 1 tablet (5 mg total) by mouth 2 (two) times a day, Disp: 60 tablet, Rfl: 3    ascorbic acid (VITAMIN C) 500 mg tablet, Take 500 mg by mouth daily , Disp: , Rfl:     Cartia  MG 24 hr capsule, take 1 capsule by mouth twice a day .YOU MAKE TAKE AN ADDITIONAL  CAPSULE DAILY IF YOU HAVE PALPITATIONS AND ELEVATED HEART RATE CONSISTENT WITH YOUR A FIB. MAX 3 CAPS DAILY, Disp: 270 capsule, Rfl: 1    Cetirizine HCl (ZyrTEC Allergy) 10 MG CAPS, Take 10 mg by mouth in the morning (Patient not taking: Reported on 5/13/2024), Disp: , Rfl:     Cholecalciferol 50 MCG (2000 UT) CAPS, Take 2,000 Units by mouth 2 (two) times a day, Disp: , Rfl:     Chromium Picolinate (CHROMIUM PICOLATE PO), Take 1 tablet by mouth daily, Disp: , Rfl:     ezetimibe (ZETIA) 10 mg tablet, take 1 tablet by mouth once daily, Disp: 90 tablet, Rfl: 3    flecainide (TAMBOCOR) 150 MG tablet, Take 1 tablet (150 mg total) by mouth 2 (two) times a day, Disp: 60 tablet, Rfl: 0    furosemide (LASIX) 20 mg tablet, Take 1 tablet (20 mg total) by mouth daily take 2 tablet by mouth daily if needed for shortness of breath WEIGHT GAIN 3 LBS IN 1 DAY OR LOWER LEG SWELLING, Disp: 90 tablet, Rfl: 3    gabapentin (NEURONTIN) 300 mg capsule, take 1 capsule by mouth at bedtime, Disp: 90 capsule, Rfl: 1    ipratropium (ATROVENT) 0.03 % nasal spray, 2 sprays into each nostril 3 (three) times a day Begin once per day, then progress to twice per day. Progress to three times a day as tolerated. (Patient not taking: Reported on 1/31/2024), Disp: 90 mL, Rfl: 3    metoprolol succinate (TOPROL-XL) 100 mg 24 hr tablet, Take 1 tablet (100 mg total) by mouth every 12 (twelve) hours, Disp: 180 tablet, Rfl: 3    rOPINIRole (REQUIP) 1 mg tablet, Take 2 tablets (2 mg total) by mouth daily at bedtime, Disp: 180 tablet, Rfl: 1    sodium chloride 3 % inhalation solution, Take 4 mL by nebulization as needed for cough, Disp: 152 mL, Rfl: 2    TURMERIC PO, Take 1 capsule by mouth 2 (two) times a day, Disp: , Rfl:     zolpidem (AMBIEN) 10 mg tablet, Take 1 tablet (10 mg total) by mouth daily at bedtime as needed for sleep, Disp: 90 tablet, Rfl: 1    Social History     Socioeconomic History    Marital status:      Spouse name: Not on file     Number of children: Not on file    Years of education: Not on file    Highest education level: Not on file   Occupational History    Not on file   Tobacco Use    Smoking status: Never    Smokeless tobacco: Never   Vaping Use    Vaping status: Never Used   Substance and Sexual Activity    Alcohol use: Not Currently    Drug use: Never    Sexual activity: Not Currently   Other Topics Concern    Not on file   Social History Narrative    Not on file     Social Determinants of Health     Financial Resource Strain: Patient Unable To Answer (12/19/2023)    Overall Financial Resource Strain (CARDIA)     Difficulty of Paying Living Expenses: Patient unable to answer   Food Insecurity: No Food Insecurity (5/22/2024)    Hunger Vital Sign     Worried About Running Out of Food in the Last Year: Never true     Ran Out of Food in the Last Year: Never true   Transportation Needs: No Transportation Needs (5/22/2024)    PRAPARE - Transportation     Lack of Transportation (Medical): No     Lack of Transportation (Non-Medical): No   Physical Activity: Not on file   Stress: Not on file   Social Connections: Not on file   Intimate Partner Violence: Not At Risk (7/31/2023)    [facility], [facility]    Humiliation, Afraid, Rape, and Kick questionnaire     Fear of Current or Ex-Partner: No     Emotionally Abused: No     Physically Abused: No     Sexually Abused: No   Housing Stability: Low Risk  (5/22/2024)    Housing Stability Vital Sign     Unable to Pay for Housing in the Last Year: No     Number of Times Moved in the Last Year: 1     Homeless in the Last Year: No       Family History   Problem Relation Age of Onset    Heart disease Mother     Diabetes Mother     Heart disease Father     Coronary artery disease Father     Stroke Father         cerebrovascular accident    Heart attack Father         myocardial infarction    Sudden death Father         scd    Other Family         Back  "disorder    Coronary artery disease Family     Neuropathy Family     Osteoporosis Family     No Known Problems Daughter     No Known Problems Maternal Grandmother     No Known Problems Maternal Grandfather     No Known Problems Paternal Grandmother     No Known Problems Paternal Grandfather     Cancer Maternal Uncle     Breast cancer Maternal Aunt 65    No Known Problems Son     No Known Problems Maternal Aunt     No Known Problems Maternal Aunt     No Known Problems Maternal Aunt     No Known Problems Paternal Aunt     No Known Problems Paternal Aunt     Anuerysm Neg Hx     Clotting disorder Neg Hx     Arrhythmia Neg Hx     Heart failure Neg Hx        Physical Exam:  /74 (BP Location: Right arm, Patient Position: Sitting, Cuff Size: Standard)   Pulse 62   Ht 5' 2\" (1.575 m)   Wt 72.7 kg (160 lb 3.2 oz)   LMP 02/01/1990 (Within Weeks)   SpO2 96%   BMI 29.30 kg/m²     Wt Readings from Last 3 Encounters:   05/22/24 72.8 kg (160 lb 7.9 oz)   05/13/24 71.2 kg (157 lb)   05/06/24 71.7 kg (158 lb)           GEN: Farnaz Martin appears well, alert and oriented x 3, pleasant and cooperative   HEENT: pupils equal, round, and reactive to light; extraocular muscles intact  NECK: supple, no carotid bruits   HEART: regular rhythm, normal S1 and S2, no murmurs, clicks, gallops or rubs, JVP is flat   LUNGS: clear to auscultation bilaterally; no wheezes, rales, or rhonchi   ABDOMEN: normal bowel sounds, soft, no tenderness, no distention  EXTREMITIES: peripheral pulses normal; no clubbing, cyanosis, or edema  NEURO: no focal findings   SKIN: normal without suspicious lesions on exposed skin    Labs & Results:  Lab Results   Component Value Date    SODIUM 130 (L) 05/22/2024    K 4.5 05/22/2024    CL 97 05/22/2024    CO2 26 05/22/2024    BUN 23 05/22/2024    CREATININE 0.91 05/22/2024    GLUC 142 (H) 05/22/2024    CALCIUM 9.2 05/22/2024     This SmartLink has not been configured with any valid records.      Lab Results "   Component Value Date    WBC 6.68 05/21/2024    HGB 12.3 05/21/2024    HCT 36.9 05/21/2024    MCV 92 05/21/2024     05/21/2024     Lab Results   Component Value Date     (H) 01/22/2024      Lab Results   Component Value Date    LDLCALC 64 01/09/2024     Lab Results   Component Value Date    ESL6KHENWIFG 2.108 05/20/2024     Lab Results   Component Value Date    HGBA1C 8.9 (H) 05/07/2024         EKG personally reviewed by JENNY Fry.   No results found for this visit on 05/29/24.     Counseling / Coordination of Care  Total face to face time spent with patient 20 minutes.  An additional 10 minutes was spent for chart/data review and visit preparation.       Thank you for the opportunity to participate in the care of this patient.    JENNY Fry

## 2024-05-29 ENCOUNTER — OFFICE VISIT (OUTPATIENT)
Dept: CARDIOLOGY CLINIC | Facility: CLINIC | Age: 79
End: 2024-05-29
Payer: MEDICARE

## 2024-05-29 VITALS
HEIGHT: 62 IN | DIASTOLIC BLOOD PRESSURE: 74 MMHG | BODY MASS INDEX: 29.48 KG/M2 | WEIGHT: 160.2 LBS | OXYGEN SATURATION: 96 % | HEART RATE: 62 BPM | SYSTOLIC BLOOD PRESSURE: 124 MMHG

## 2024-05-29 DIAGNOSIS — I48.19 PERSISTENT ATRIAL FIBRILLATION (HCC): ICD-10-CM

## 2024-05-29 PROCEDURE — 99214 OFFICE O/P EST MOD 30 MIN: CPT | Performed by: NURSE PRACTITIONER

## 2024-05-29 RX ORDER — PREDNISONE 10 MG/1
TABLET ORAL
COMMUNITY
Start: 2024-05-27

## 2024-05-29 NOTE — PATIENT INSTRUCTIONS
Weigh yourself daily  If you gain 3 lbs in one day or 5 lbs in one week, please call the office at [phone] and ask for a nurse or the heart failure nurse  Keep your sodium intake to <2 grams, (2000 mg) per day, and fluids <2 Liters (2000 ml) per day. This is around 6-7, 8 oz glasses of fluid per day    You may take an additional 20 mg of Lasix as needed for rapid weight gain.

## 2024-05-30 ENCOUNTER — PATIENT OUTREACH (OUTPATIENT)
Dept: FAMILY MEDICINE CLINIC | Facility: CLINIC | Age: 79
End: 2024-05-30

## 2024-05-30 NOTE — PROGRESS NOTES
Spoke with Farnaz reports she is doing fine since coming home from hospital. Does not have any questions about medications or discharged instructions  Declines further outreach at this time.

## 2024-05-31 ENCOUNTER — TELEPHONE (OUTPATIENT)
Age: 79
End: 2024-05-31

## 2024-05-31 ENCOUNTER — OFFICE VISIT (OUTPATIENT)
Dept: FAMILY MEDICINE CLINIC | Facility: CLINIC | Age: 79
End: 2024-05-31
Payer: MEDICARE

## 2024-05-31 VITALS
BODY MASS INDEX: 28.98 KG/M2 | TEMPERATURE: 97.4 F | WEIGHT: 157.5 LBS | RESPIRATION RATE: 16 BRPM | DIASTOLIC BLOOD PRESSURE: 68 MMHG | HEIGHT: 62 IN | SYSTOLIC BLOOD PRESSURE: 122 MMHG | OXYGEN SATURATION: 96 % | HEART RATE: 61 BPM

## 2024-05-31 DIAGNOSIS — R05.3 CHRONIC COUGH: ICD-10-CM

## 2024-05-31 DIAGNOSIS — I50.32 CHRONIC DIASTOLIC CHF (CONGESTIVE HEART FAILURE) (HCC): ICD-10-CM

## 2024-05-31 DIAGNOSIS — I27.20 PULMONARY HYPERTENSION (HCC): ICD-10-CM

## 2024-05-31 DIAGNOSIS — I36.1 NONRHEUMATIC TRICUSPID VALVE REGURGITATION: ICD-10-CM

## 2024-05-31 DIAGNOSIS — I35.1 NONRHEUMATIC AORTIC VALVE INSUFFICIENCY: ICD-10-CM

## 2024-05-31 DIAGNOSIS — J47.9 BRONCHIECTASIS WITHOUT COMPLICATION (HCC): ICD-10-CM

## 2024-05-31 DIAGNOSIS — I48.91 ATRIAL FIBRILLATION WITH RAPID VENTRICULAR RESPONSE (HCC): Primary | ICD-10-CM

## 2024-05-31 DIAGNOSIS — R80.9 TYPE 2 DIABETES MELLITUS WITH MICROALBUMINURIA, WITHOUT LONG-TERM CURRENT USE OF INSULIN (HCC): ICD-10-CM

## 2024-05-31 DIAGNOSIS — E11.29 TYPE 2 DIABETES MELLITUS WITH MICROALBUMINURIA, WITHOUT LONG-TERM CURRENT USE OF INSULIN (HCC): ICD-10-CM

## 2024-05-31 DIAGNOSIS — I10 ESSENTIAL HYPERTENSION: ICD-10-CM

## 2024-05-31 DIAGNOSIS — E87.1 HYPONATREMIA: ICD-10-CM

## 2024-05-31 DIAGNOSIS — I50.32 CHRONIC DIASTOLIC CHF (CONGESTIVE HEART FAILURE) (HCC): Primary | ICD-10-CM

## 2024-05-31 DIAGNOSIS — I34.0 NONRHEUMATIC MITRAL VALVE REGURGITATION: ICD-10-CM
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
## 2024-05-31 PROCEDURE — 99495 TRANSJ CARE MGMT MOD F2F 14D: CPT | Performed by: FAMILY MEDICINE

## 2024-05-31 RX ORDER — DAPAGLIFLOZIN 5 MG/1
5 TABLET, FILM COATED ORAL DAILY
Qty: 30 TABLET | Refills: 5 | Status: SHIPPED | OUTPATIENT
Start: 2024-05-31 | End: 2024-11-27

## 2024-05-31 NOTE — PROGRESS NOTES
Transition of Care Visit  Name: Farnaz Martin      : 1945      MRN: [id number]  Encounter Provider: Naveen Monsivais MD  Encounter Date: 2024   Encounter department: [facility]    Assessment & Plan   1. Atrial fibrillation with rapid ventricular response (HCC)  2. Fall, initial encounter  3. Hyponatremia  4. Type 2 diabetes mellitus with microalbuminuria, without long-term current use of insulin (HCC)  -     Hemoglobin A1C  5. Essential hypertension  -     Basic metabolic panel; Future; Expected date: 2024  -     CBC and Platelet; Future; Expected date: 2024  6. Chronic diastolic CHF (congestive heart failure) (HCC)  7. Pulmonary hypertension (HCC)  8. Nonrheumatic mitral valve regurgitation  9. Nonrheumatic aortic valve insufficiency  10. Nonrheumatic tricuspid valve regurgitation  11. Bronchiectasis without complication (HCC)  12. Chronic cough    Continue with current medications. I recommended starting SGLT-2 either Jardiance or Farixga.      Repeat CBC and BMP in several weeks. A1c in 3 months     Finish Prednisone taper.  Consider steroid inhaler if symptoms return.     Follow up with Cardiology and Urology.          History of Present Illness     Transitional Care Management Review:   Farnaz Martin is a 78 y.o. female here for TCM follow up.     During the TCM phone call patient stated:  TCM Call       Date and time call was made  2024  8:26 AM    Hospital care reviewed  Records reviewed    Patient was hospitialized at  [facility]    Comment  Atrial fibrillation with rapid ventricular response     Date of Admission  24    Date of discharge  24    Diagnosis  atrial fibrillation with rapid ventricular response    Disposition  Home    Were the patients medications reviewed and updated  Yes    Current Symptoms  None    Cough Severity  Mild    Weakness severity  Moderate    Fatigue severity  Moderate          TCM Call       Post  hospital issues  None    Should patient be enrolled in anticoag monitoring?  No    Scheduled for follow up?  Yes    Patients specialists  Cardiologist    Cardiologist name  Dr. Sigifredo Hinojosa     Did you obtain your prescribed medications  Yes    Do you need help managing your prescriptions or medications  No    Why type of assitance do you need  Daughter helps with medication adherence.    Is transportation to your appointment needed  No    Specify why  No longer driving.    I have advised the patient to call PCP with any new or worsening symptoms  Zaira ram MA    Living Arrangements  Family members    Support System  Family    The type of support provided  Emotional; Physical    Do you have social support  Yes, as much as I need    Are you recieving any outpatient services  No    What type of services  [facility]    Are you recieving home care services  No    Types of home care services  Home PT; Nurse visit    Are you using any community resources  No    Current waiver services  No    Have you fallen in the last 12 months  No    How many times  Once    Interperter language line needed  No    Counseling  Family    Counseling topics  Prognosis          TCM admission 5/24 to 5/27/2024.  Status post fall.  Cough and shortness of breath known bronchiectasis.  Chronically low sodium.  Chest x-ray diffuse interstitial thickening bilaterally suspicious for interstitial edema.  Small bilateral pleural effusions.  CT scan head no acute intracranial abnormality.  Small rounded hyperdense foci in left frontal temporal region likely represents calcifications.  CT cervical spine no cervical spine fracture or traumatic malalignment.  Incidental thyroid nodule.  Interlobular septal thickening in the lung apices suspicious for interstitial edema.  EKG AV dual paced rhythm with prolonged AV conduction.  Echocardiogram left ventricular cavity size normal.  Wall thickness normal.  Left ventricular ejection  fraction 55%.  Wall motion normal.  Right ventricular cavity size normal.  Right ventricular systolic function normal.  Trace AR.  Moderate to severe MR.  Mild to moderate TR.  Right ventricular systolic pressure severely elevated.  Left pleural effusion.  Patient was discharged on tapering course of Prednisone with improved cough.  Dose of Flecainide decreased to 100 mg twice a day.  On Lasix 20 mg daily. Discharge sodium 129.  Potassium 3.9.  Creatinine 0.78.  GFR 72.  Hemoglobin 10.5. PT evaluation prior to discharge            Admission 5/20/2024 to 5/22/2024 diagnosis atrial fibrillation with rapid ventricular response.  Patient presented with palpitations and exertional dyspnea.  Status post pacemaker.  Pacemaker interrogation-atrial tachycardia. Troponins negative. CXR  Prominent interstitial markings with probable small pleural effusions consistent with mild pulmonary edema. Patient was noted to be hyponatremic on admission sodium 128 improving to 130 with IV saline.    Lab Results       Component                Value               Date                       SODIUM                   130 (L)             05/22/2024                 K                        4.5                 05/22/2024                 CL                       97                  05/22/2024                 CO2                      26                  05/22/2024                 BUN                      23                  05/22/2024                 CREATININE               0.91                05/22/2024                 GLUC                     142 (H)             05/22/2024                 CALCIUM                  9.2                 05/22/2024         Lab Results       Component                Value               Date                       WBC                      6.68                05/21/2024                 HGB                      12.3                05/21/2024                 HCT                      36.9                05/21/2024                  MCV                      92                  05/21/2024                 PLT                      227                 05/21/2024            Lab Results       Component                Value               Date                       [id number]             2.108               05/20/2024 02/2024 Urology evaluation for microscopic hematuria. 03/2024 cystoscopy normal. 02/2024 urine culture + Proteus. CT renal protocol 1.7 cm enhancing mass arising from the posterior medial aspect of the left kidney midpole highly suspicious for malignancy.  Subtle haziness of perivesicular fat which may represent a mild cystitis.  No discrete bladder lesions.  Hypodense lesion present within the pancreatic head measuring up to 2.5 cm with a multicystic appearance possibly representing a serocystadenoma.  Mildly enlarged left external iliac chain lymph node.  Left lower lobe pulmonary nodule 3.5 mm stable from cardiac CT 2/2021.  Superficial density seen measuring 1.5 cm near the inferior portion of right breast not appreciably changed from 8/2023.  Mammogram 3/2024 normal    05/2024 repeat CT scan 1.8 x 1.5 cm heterogeneously enhancing lesion in the mid left kidney with washout on delayed images suggestive of renal cell carcinoma. No renal vein or renal collecting system involvement. No lymphadenopathy.   Hepatomegaly with steatosis. 3 mm gallbladder polyp versus stone.  Redemonstration of cluster of cystic lesions in the pancreatic head recently evaluated with MRI. No pancreatic duct dilation.      She was seen by IR re cryo ablation procedure for renal mass. This was deferred. Patient and daughter opted not to pursue GI evaluation re cystic lesions pancreas         Type 2 DM diet controlled to date. 05/2024  A1c 8.9 increased from  7.6. 01/2024 Urine albumin 21.2-no longer on ARB. No DPN. Current with eye exam.     Lab Results       Component                Value               Date                        HGBA1C                   8.9 (H)             05/07/2024                Hypertension  Losartan 50 mg BID stopped during low BP. She remains on Metoprolol  mg BID and Cardizem  mg BID. 05/2024 creatinine 0.78. GFR 72. Electrolytes normal except for Na+  129. Hgb 10.5.       Hyperlipidemia on Zetia 10 mg daily. 07/2023 Cholesterol 163.  Triglycerides 68.  HDL 53.  LDL 96.               01/2024 admission 01/05/2024 to 01/06/2024 Dx palpitations associated with shortness of breath. History of AF w/ 2 prior ablations-s/p pacemaker.  Device interrogation 1/5/2024; sinus tach, AP 91%,  7% normal device function. Admission EKG Electronic ventricular pacemaker.Troponin negative. CTA no PE.  CBC normal. Hgb 12.5. CMP normal except for Na+ 129 and chloride 93. LFTs normal. TSH 3.644. Sinus tachycardia fluid responsive. She advised at discharge to use additional once per day Cardizem  mg as needed for tachycardia. Repeat out patient sodium 128. . Corrected Na+ 129. Now on fluid restriction.             08/2023 admission Diagnosis atrial fibrillation with rapid ventricular response.  2 prior ablations with pacemaker.  Chronic diastolic CHF, acute blood loss secondary to recent left TKR and hyponatremia. 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  CTA no acute PE.  Mild heterogeneous liver with slight lobulation along the left lobe.  Incidental thyroid nodule.  Venous Dopplers lower extremities no DVT.  Abdominal ultrasound hepatomegaly.  No evidence of cirrhosis or liver mass.  6 mm gallbladder polyp.  Troponin negative.  Elevated BNP at 651.  TSH 2.439.  Admission electrolytes normal for sodium 132.  LFTs normal except for total bilirubin 1.07.  Hemoglobin 10.0.  Patient was treated with IV Cardizem and started on oral Flecainide.  She ultimately underwent cardioversion.  Preprocedure SHANNAN Normal left ventricular systolic function.  EF 65%.  Wall motion normal.  Right ventricle  normal.  Left atrium moderately dilated.  No thrombus.  No PFO.  Left atrial appendage dilated.  Normal function.  No thrombus.  Moderate TR.                Review of Systems   Constitutional:  Negative for appetite change, chills, fatigue, fever and unexpected weight change.   HENT:  Positive for hearing loss (bilateral hearing aids ). Negative for congestion, ear pain, rhinorrhea, sore throat and trouble swallowing.    Eyes:  Negative for visual disturbance.   Respiratory:  Positive for cough. Negative for shortness of breath and wheezing.         See HPI Chronic cough non productive. No nasal congestion or postnasal drainage.  On daily Zyrtec.  No reflux symptoms on daily PPI.  No history of asthma.  No prior allergy testing. 01/2024 CTA no PE. Lingular segment left upper lobe discoid atelectasis. Mild bibasilar dependent changes noted. Right greater than left bibasilar mild bronchiectasis. 1/2024 PFTs mild diffusion defect.  No significant response to inhaled bronchodilator.   Cardiovascular:  Negative for chest pain, palpitations and leg swelling.        See HPI.  Repeat echocardiogram 12/2023 left ventricular cavity size normal.  Left ventricular ejection fraction 55%.  Left ventricular systolic function normal.  Wall motion normal.  Diastolic function abnormal.  Dilated left atrium.  Moderate MR.  Moderate TR.    06/2022 admission for CHF, hyponatremia with altered mental status.  Chest x-ray shows small bilateral effusions. .  Sodium prior to admission 125.  On repeat 128 in the setting of volume overload.  Patient was treated with IV diuresis with symptomatic improvement.  Discharge sodium 136.   Mental status improved back to baseline.  Prior  cardio versions.     03/2021 admission for atrial fibrillation status post ablation procedure. Pre procedure episode of bradycardia with QT prolongation and torsades successfully defibrillated.  Subsequent pacemaker placed. Repeat ablation 08/2022 at [place].         Gastrointestinal:  Negative for abdominal pain, blood in stool, constipation, diarrhea, nausea and vomiting.        See HPI.  12/2023 u/s elastography E median:  6.56 kPa. Corresponding Metavir score is F0-F1(absent or mild fibrosis), range 0-8.26kPa. <1.66 m/s. IQR/median:  14.7 %  Colonoscopy 04/2018 Two benign polyps.  Mild diverticulosis left-sided colon.   Endocrine: Negative for polydipsia and polyuria.        See HPI.  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.  Repeat thyroid u/s 12/2023  3 additional nodules of lesser size and/or TI-RADS score.  These do not necessitate additional evaluation based on ACR criteria.      09/2018 DEXA scan T-score -1.5 left femoral neck. On vitamin D supplement    Lab Results       Component                Value               Date                       [id number]             2.439               08/04/2023                                                          Genitourinary:  Negative for difficulty urinating.   Musculoskeletal:  Negative for arthralgias and myalgias.        See HPI. OA shoulders.  07/2019 x-rays right shoulder mild degenerative arthritis right AC joint.  Left shoulder mild degenerative changes left AC joint.  08/2019 bilateral shoulder intra-articular steroid injections via fluoroscopy with symptomatic relief. S/p bilateral TKR.    Skin:  Negative for rash.   Neurological:  Positive for tremors. Negative for dizziness and headaches.        CNS meningioma. 10/2023 MRI brain Stable meningioma within the left anterior lateral anterior cranial fossa.  Stable advanced chronic microangiopathic change   04/2022 MRI brain 4.2 x 1.7 x 3.0 cm fairly homogeneously enhancing, extra-axial mass within the inferior lateral aspect of the left anterior cranial fossa.  Mild adjacent dural thickening and enhancement is seen  "extending inferiorly into the middle cranial fossa and superiorly into the dura of the anterior frontal vertex.  Findings are most suggestive of meningioma. Neurosurgical evaluation at [facility] 05/2022. Second opinion at [place]. RLS on Requip. 04/2021 neurology evaluation for tremor suspected benign essential  tremor.  She is on a Beta-blocker and Gabapentin.  Family history + essential tremor brother.    Hematological:  Negative for adenopathy. Does not bruise/bleed easily.        Monoclonal gammopathy. 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. 1/2023 IgG level normal at 905    Lab Results       Component                Value               Date                       WBC                      6.68                05/21/2024                 HGB                      12.3                05/21/2024                 HCT                      36.9                05/21/2024                 MCV                      92                  05/21/2024                 PLT                      227                 05/21/2024                          Lab Results       Component                Value               Date                       [id number]               799                 07/01/2022                          MMA elevated at 489   Psychiatric/Behavioral:  Positive for sleep disturbance.         Chronic insomnia on prn Zolpidem 10 mg               Objective     /68 (BP Location: Left arm, Patient Position: Sitting, Cuff Size: Standard)   Pulse 61   Temp (!) 97.4 °F (36.3 °C)   Resp 16   Ht 5' 2\" (1.575 m)   Wt 71.4 kg (157 lb 8 oz)   LMP 02/01/1990 (Within Weeks)   SpO2 96%   BMI 28.81 kg/m²     BP Readings from Last 3 Encounters:   05/31/24 122/68   05/29/24 124/74   05/22/24 109/54      Wt Readings from Last 3 Encounters:   05/31/24 71.4 kg (157 lb 8 oz)   05/29/24 72.7 kg (160 lb 3.2 oz)   05/22/24 72.8 kg (160 lb 7.9 oz)        Physical Exam  Constitutional:       General: She is not in acute distress.  Eyes:      General: No " scleral icterus.     Conjunctiva/sclera: Conjunctivae normal.   Neck:      Thyroid: No thyroid mass or thyromegaly.      Vascular: Normal carotid pulses. No carotid bruit or JVD.   Cardiovascular:      Rate and Rhythm: Normal rate and regular rhythm.      Heart sounds: Murmur heard.      Systolic (holosytolic) murmur is present with a grade of 2/6.      No gallop.   Pulmonary:      Effort: Pulmonary effort is normal.      Breath sounds: Normal breath sounds. No wheezing or rales.   Musculoskeletal:      Right lower leg: No edema.      Left lower leg: No edema.   Lymphadenopathy:      Cervical: No cervical adenopathy.   Skin:     Coloration: Skin is not cyanotic.      Findings: No rash.      Nails: There is no clubbing.   Neurological:      General: No focal deficit present.      Mental Status: She is alert and oriented to person, place, and time.   Psychiatric:         Mood and Affect: Mood normal.       Medications have been reviewed by provider in current encounter    Administrative Statements

## 2024-05-31 NOTE — TELEPHONE ENCOUNTER
Pt daughter was calling in to inform Dr. Monsivais that she check with the pt insurance and it look like the Farxiga is going to be the more affordable prescription for the pt. Pt daughter would like this medication sent to the [facility] on [address] [place] [address]. Please advise with the pt daughter if needed thank you.

## 2024-06-03 ENCOUNTER — TELEPHONE (OUTPATIENT)
Age: 79
End: 2024-06-03

## 2024-06-03 ENCOUNTER — NURSE TRIAGE (OUTPATIENT)
Age: 79
End: 2024-06-03

## 2024-06-03 NOTE — TELEPHONE ENCOUNTER
PT was calling to check the status of the farxiga samples which he did leave some samples aside for her. Pt also would like a callback from  in ref to her not feeling well this morning. Please advise

## 2024-06-03 NOTE — TELEPHONE ENCOUNTER
Called patient gave her message, patient states she not taking the losartan.  Patient verbally understood

## 2024-06-03 NOTE — TELEPHONE ENCOUNTER
"Pt called in stating that her BP was 99/62 this morning and she is feeling fatigued. Pt was seen in office 5/31 for TCM. Pt denies chest pain, SOB, dizziness or weakness. Pt states she did take her morning medications but is unable to confirm which medications she took as her daughter takes care of it. Pt would like PCP to be made aware and call back.     Reason for Disposition   Systolic BP  while taking blood pressure medications and NOT dizzy, lightheaded or weak    Answer Assessment - Initial Assessment Questions  1. BLOOD PRESSURE: \"What is the blood pressure?\" \"Did you take at least two measurements 5 minutes apart?\"      99/62  2. ONSET: \"When did you take your blood pressure?\"      This morning  3. HOW: \"How did you obtain the blood pressure?\" (e.g., visiting nurse, automatic home BP monitor)      Home BP monitor  4. HISTORY: \"Do you have a history of low blood pressure?\" \"What is your blood pressure normally?\"      Yes-unsure when  5. MEDICATIONS: \"Are you taking any medications for blood pressure?\" If yes: \"Have they been changed recently?\"      Ye  6. PULSE RATE: \"Do you know what your pulse rate is?\"       68  7. OTHER SYMPTOMS: \"Have you been sick recently?\" \"Have you had a recent injury?\"      Fatigue  8. PREGNANCY: \"Is there any chance you are pregnant?\" \"When was your last menstrual period?\"      NA    Protocols used: Low Blood Pressure-ADULT-[place]    "

## 2024-06-03 NOTE — TELEPHONE ENCOUNTER
Pt called c/o low blood pressure this morning of 99-62, tiredness and having to sit down because she was out of breath after just walking around the house.  Warm transferred to Triage Nurse.      Prior to the transfer, pt asked about Farxiga samples.  She said Dr Monsivais had told her to call to ask for samples.  Attempted to call office, but call dropped after a few minutes.  Please call pt to let her know if they are available.

## 2024-06-10 NOTE — TELEPHONE ENCOUNTER
Pts daughter Ynes called to say her mother has been feeling very fatigued since starting the sample dose of Farxiga 10mg a week ago. When she picked up the prescribed dose of dapagliflozin 5mg she started the pt on that. Ynes just wanted to confirm her mother should be on the 5mg and that the fatigued feeling is ok. Please advise.

## 2024-06-10 NOTE — TELEPHONE ENCOUNTER
Called patients daughter no response left message to call the office back was calling to give her  message see message below

## 2024-06-11 ENCOUNTER — OFFICE VISIT (OUTPATIENT)
Dept: UROLOGY | Facility: AMBULATORY SURGERY CENTER | Age: 79
End: 2024-06-11
Payer: MEDICARE

## 2024-06-11 VITALS
HEART RATE: 76 BPM | DIASTOLIC BLOOD PRESSURE: 70 MMHG | WEIGHT: 149 LBS | HEIGHT: 62 IN | BODY MASS INDEX: 27.42 KG/M2 | OXYGEN SATURATION: 94 % | SYSTOLIC BLOOD PRESSURE: 110 MMHG

## 2024-06-11 DIAGNOSIS — N28.89 RENAL MASS, LEFT: Primary | ICD-10-CM

## 2024-06-11 PROCEDURE — 99213 OFFICE O/P EST LOW 20 MIN: CPT

## 2024-06-11 NOTE — PROGRESS NOTES
Office Visit- Urology  Farnaz Martin 1945 MRN: [id number]      Assessment/Discussion/Plan    78 y.o. female managed by     Left renal mass  -Consistent with malignancy first identified on CT urogram in March 2024 measuring 1.7 cm   -subsequent MRI of the abdomen with and without contrast in April 2024 demonstrated stability of 1.7 cm left interpolar renal mass  -Patient discussed with physician about observation versus IR mediated cryoablation versus nephrectomy.  -Patient consulted with IR in regards to prior ablation and ultimately patient decided to to continue with observation with plan for CT scan 3 months from time of consult  -Patient had CT of the abdomen early with dimensions measuring 1.8 cm x 1.5 cm with no renal vein or renal collecting system involvement.   -She has appointment with IR later this month to review imaging.  Patient states that she is strongly considering proceeding with chemotherapy but her family member has strong hesitations due to patient's other comorbidities  - We will plan for follow-up in 6 months. If patient continues with observation we will plan for a CT of the abdomen with and without contrast for continued observation in 6 months    2.  Lower urinary tract symptoms  -cystoscopy was negative    -patient reports that it symptomatolgy has resolved.       Chief Complaint:   Farnaz is a 78 y.o. female presenting to the office for a follow up visit regarding  Left Renal Mass        Subjective    Hx 2/14/2024  Patient is a 78-year-old female with no significant past urologic history who presents to the office today for evaluation of lower tract symptoms. She states that on Sunday she was experiencing frequency, urgency, suprapubic pain, and dysuria. Symptoms have since improved slightly with most improvement in dysuria. She states that her PCP previously prescribed 2 courses of Macrobid for urinary tract symptoms before but this did not help with her urinary symptoms. Urine  culture was not obtained. Patient last had a positive urine culture in September 2023 with growth of E. coli which at that time was susceptible to nitrofurantoin she denies any occurrence of gross hematuria. She states that sometimes she feels that she is not completely able to empty her bladder. PVR today is 31 mL. She does have a history of a hysterectomy but routinely follows with gynecology and there is been no evidence of pelvic or prolapse on examination.     Hx 6/11/2024  In the interim since patient was seen urine testing revealed microscopic hematuria.  This prompted obtainment of a CT renal protocol which demonstrated a 1.7 enhancing mass in the posterior aspect of the left kidney suspicious for malignancy.  Patient had a cystoscopy with Dr. Mcgowan in discussion about management of the mass.  At that point in time it was decided to consult with interventional radiology for consideration for cryoablation.  At that consultation it was decided to observe with a CT scan in 3 months.  Repeat CT scan in May demonstrated an essentially stable 1.8 cm renal mass    ROS:   Review of Systems   Constitutional: Negative.  Negative for chills, fatigue and fever.   HENT: Negative.     Respiratory:  Negative for shortness of breath.    Cardiovascular:  Negative for chest pain.   Gastrointestinal: Negative.  Negative for abdominal pain.   Endocrine: Negative.    Musculoskeletal: Negative.    Skin: Negative.    Neurological: Negative.  Negative for dizziness and light-headedness.   Hematological: Negative.    Psychiatric/Behavioral: Negative.           Past Medical History  Past Medical History:   Diagnosis Date    Actinic keratosis     last assessed - 06Jun2014    Arthritis     Atrial fibrillation with rapid ventricular response (HCC)     last assessed - 26Apr2016    Atrial fibrillation with RVR (HCC)     Basal cell carcinoma     Benign colon polyp     last assessed - 27Apr2015    Bronchiectasis without complication  (HCC)     CHF (congestive heart failure) (HCC) 06/2022    Chronic diastolic CHF (congestive heart failure) (HCC)     Disease of thyroid gland     Effusion of knee joint right     Resolved - [id number]    Esophageal reflux     Fibromyalgia     Fibromyalgia     last assessed - [id number]    Fibromyalgia, primary     GERD (gastroesophageal reflux disease)     Hyperlipidemia     Hypertension     Hyponatremia     Malignant neoplasm of thyroid gland (HCC)     Meningioma (HCC)     MGUS (monoclonal gammopathy of unknown significance)     Palpitations     last assessed - [id number]    Peroneal tendonitis, right     last assessed - [id number]    Right lumbar radiculopathy 03/17/2016    Thyroid cancer (HCC)     Thyroid nodule     Trochanteric bursitis of left hip 03/09/2018       Past Surgical History  Past Surgical History:   Procedure Laterality Date    CARDIAC ELECTROPHYSIOLOGY STUDY AND ABLATION  08/2022    CATARACT EXTRACTION Bilateral     COLONOSCOPY  03/2018    COLONOSCOPY W/ POLYPECTOMY  2021    repeat in 5 years    EYE SURGERY      HYSTERECTOMY      JOINT REPLACEMENT Left     knee    OOPHORECTOMY      TN NEUROPLASTY &/TRANSPOS MEDIAN NRV CARPAL TUNNE Right 11/14/2019    Procedure: RELEASE CARPAL TUNNEL;  Surgeon: Onesimo Tate MD;  Location: [facility];  Service: Orthopedics    TN TOTAL THYROID LOBECTOMY UNI W/WO ISTHMUSECTOMY Left 12/16/2020    Procedure: Left THYROID LOBECTOMY;  Surgeon: Jhony Bhatt MD;  Location: [facility];  Service: ENT    RECTAL POLYPECTOMY      REPLACEMENT TOTAL KNEE Left     last assessed - [id number]    THYROID LOBECTOMY Left     TONSILLECTOMY      TOTAL ABDOMINAL HYSTERECTOMY      TUBAL LIGATION      US GUIDED THYROID BIOPSY  10/14/2020       Past Family History  Family History   Problem Relation Age of Onset    Heart disease Mother     Diabetes Mother     Heart disease Father     Coronary artery disease Father     Stroke Father         cerebrovascular accident    Heart attack Father          myocardial infarction    Sudden death Father         scd    Other Family         Back disorder    Coronary artery disease Family     Neuropathy Family     Osteoporosis Family     No Known Problems Daughter     No Known Problems Maternal Grandmother     No Known Problems Maternal Grandfather     No Known Problems Paternal Grandmother     No Known Problems Paternal Grandfather     Cancer Maternal Uncle     Breast cancer Maternal Aunt 65    No Known Problems Son     No Known Problems Maternal Aunt     No Known Problems Maternal Aunt     No Known Problems Maternal Aunt     No Known Problems Paternal Aunt     No Known Problems Paternal Aunt     Anuerysm Neg Hx     Clotting disorder Neg Hx     Arrhythmia Neg Hx     Heart failure Neg Hx        Past Social history  Social History     Socioeconomic History    Marital status:      Spouse name: Not on file    Number of children: Not on file    Years of education: Not on file    Highest education level: Not on file   Occupational History    Not on file   Tobacco Use    Smoking status: Never    Smokeless tobacco: Never   Vaping Use    Vaping status: Never Used   Substance and Sexual Activity    Alcohol use: Not Currently    Drug use: Never    Sexual activity: Not Currently   Other Topics Concern    Not on file   Social History Narrative    ** Merged History Encounter **          Social Determinants of Health     Financial Resource Strain: Patient Unable To Answer (12/19/2023)    Overall Financial Resource Strain (CARDIA)     Difficulty of Paying Living Expenses: Patient unable to answer   Food Insecurity: No Food Insecurity (5/24/2024)    Hunger Vital Sign     Worried About Running Out of Food in the Last Year: Never true     Ran Out of Food in the Last Year: Never true   Transportation Needs: No Transportation Needs (5/24/2024)    PRAPARE - Transportation     Lack of Transportation (Medical): No     Lack of Transportation (Non-Medical): No   Physical Activity: Not on  file   Stress: Not on file   Social Connections: Not on file   Intimate Partner Violence: Not At Risk (7/31/2023)    Received from [facility], [facility]    Humiliation, Afraid, Rape, and Kick questionnaire     Fear of Current or Ex-Partner: No     Emotionally Abused: No     Physically Abused: No     Sexually Abused: No   Housing Stability: Low Risk  (5/24/2024)    Housing Stability Vital Sign     Unable to Pay for Housing in the Last Year: No     Number of Times Moved in the Last Year: 1     Homeless in the Last Year: No       Current Medications  Current Outpatient Medications   Medication Sig Dispense Refill    acetylcysteine (MUCOMYST) 10 % nebulizer solution Take 4 mL by nebulization every 4 (four) hours as needed (for bronchiectasis)      acetylcysteine (MUCOMYST) 200 mg/mL nebulizer solution Take 4 mL (800 mg total) by nebulization every 4 (four) hours 180 mL 5    albuterol (ProAir HFA) 90 mcg/act inhaler Inhale 2 puffs every 6 (six) hours as needed for wheezing 8.5 g 3    apixaban (ELIQUIS) 5 mg Take 1 tablet (5 mg total) by mouth 2 (two) times a day 60 tablet 3    apixaban (ELIQUIS) 5 mg Take 5 mg by mouth 2 (two) times a day      ascorbic Acid (VITAMIN C) 500 MG CPCR Take 500 mg by mouth daily      ascorbic acid (VITAMIN C) 500 mg tablet Take 500 mg by mouth daily       benzonatate (TESSALON PERLES) 100 mg capsule Take 1 capsule (100 mg total) by mouth 3 (three) times a day as needed for cough 20 capsule 0    Cartia  MG 24 hr capsule take 1 capsule by mouth twice a day .YOU MAKE TAKE AN ADDITIONAL CAPSULE DAILY IF YOU HAVE PALPITATIONS AND ELEVATED HEART RATE CONSISTENT WITH YOUR A FIB. MAX 3 CAPS DAILY 270 capsule 1    Cetirizine HCl (ZyrTEC Allergy) 10 MG CAPS Take 10 mg by mouth in the morning      Cholecalciferol 50 MCG (2000 UT) CAPS Take 2,000 Units by mouth 2 (two) times a day      Chromium Picolinate (CHROMIUM PICOLATE PO) Take 1 tablet by mouth daily       dapagliflozin 5 MG TABS Take 1 tablet (5 mg total) by mouth daily 30 tablet 5    diltiazem (CARDIZEM CD) 120 mg 24 hr capsule Take 120 mg by mouth 2 (two) times a day      ezetimibe (ZETIA) 10 mg tablet take 1 tablet by mouth once daily 90 tablet 3    ezetimibe (ZETIA) 10 mg tablet Take 1 tablet (10 mg total) by mouth daily at bedtime 30 tablet 0    flecainide (TAMBOCOR) 100 mg tablet Take 1 tablet (100 mg total) by mouth 2 (two) times a day 60 tablet 0    flecainide (TAMBOCOR) 150 MG tablet Take 1 tablet (150 mg total) by mouth 2 (two) times a day (Patient taking differently: Take 100 mg by mouth 2 (two) times a day) 60 tablet 0    furosemide (LASIX) 20 mg tablet Take 1 tablet (20 mg total) by mouth daily take 2 tablet by mouth daily if needed for shortness of breath WEIGHT GAIN 3 LBS IN 1 DAY OR LOWER LEG SWELLING 90 tablet 3    furosemide (LASIX) 20 mg tablet Take 1 tablet (20 mg total) by mouth daily 30 tablet 0    gabapentin (NEURONTIN) 300 mg capsule take 1 capsule by mouth at bedtime 90 capsule 1    gabapentin (NEURONTIN) 300 mg capsule Take 300 mg by mouth daily at bedtime      ipratropium (ATROVENT HFA) 17 mcg/act inhaler Inhale 2 puffs every 6 (six) hours      ipratropium (ATROVENT) 0.03 % nasal spray 2 sprays into each nostril 3 (three) times a day Begin once per day, then progress to twice per day. Progress to three times a day as tolerated. (Patient taking differently: 2 sprays into each nostril if needed Begin once per day, then progress to twice per day. Progress to three times a day as tolerated.) 90 mL 3    metoprolol succinate (TOPROL-XL) 100 mg 24 hr tablet Take 1 tablet (100 mg total) by mouth every 12 (twelve) hours 180 tablet 3    metoprolol succinate (TOPROL-XL) 100 mg 24 hr tablet Take 100 mg by mouth 2 (two) times a day      predniSONE 10 mg tablet       rOPINIRole (REQUIP XL) 2 MG 24 hr tablet Take 2 mg by mouth daily at bedtime      rOPINIRole (REQUIP) 1 mg tablet Take 2 tablets (2 mg  "total) by mouth daily at bedtime 180 tablet 1    sodium chloride 3 % inhalation solution Take 4 mL by nebulization as needed for cough 152 mL 2    TURMERIC PO Take 1 capsule by mouth 2 (two) times a day      zolpidem (AMBIEN) 10 mg tablet Take 1 tablet (10 mg total) by mouth daily at bedtime as needed for sleep 90 tablet 1     No current facility-administered medications for this visit.       Allergies  Allergies   Allergen Reactions    Sotalol Other (See Comments)     Prolonged QTc leading to torsades de pointes     Penicillins Other (See Comments)     As a child calcium deposit in the arm     Ace Inhibitors GI Intolerance     Did feel well on it    Omeprazole Abdominal Pain, Rash and Vomiting     stomach pain, vomiting, rash       OBJECTIVE    Vitals   Vitals:    06/11/24 1340   BP: 110/70   BP Location: Left arm   Patient Position: Sitting   Cuff Size: Standard   Pulse: 76   SpO2: 94%   Weight: 67.6 kg (149 lb)   Height: 5' 2\" (1.575 m)       PVR:    Physical Exam  Constitutional:       General: She is not in acute distress.     Appearance: Normal appearance. She is normal weight. She is not ill-appearing or toxic-appearing.   HENT:      Head: Normocephalic and atraumatic.   Eyes:      Conjunctiva/sclera: Conjunctivae normal.   Cardiovascular:      Rate and Rhythm: Normal rate.   Pulmonary:      Effort: Pulmonary effort is normal. No respiratory distress.   Skin:     General: Skin is warm and dry.   Neurological:      General: No focal deficit present.      Mental Status: She is alert and oriented to person, place, and time.      Cranial Nerves: No cranial nerve deficit.   Psychiatric:         Mood and Affect: Mood normal.         Behavior: Behavior normal.         Thought Content: Thought content normal.       Labs:     Lab Results   Component Value Date    CREATININE 0.78 05/27/2024      Lab Results   Component Value Date    HGBA1C 8.9 (H) 05/07/2024     Lab Results   Component Value Date    GLUCOSE 170 (H) " 05/24/2024    CALCIUM 9.0 05/27/2024     05/06/2015    K 3.9 05/27/2024    CO2 26 05/27/2024    CL 96 05/27/2024    BUN 22 05/27/2024    CREATININE 0.78 05/27/2024       I have personally reviewed all pertinent lab results and reviewed with patient    Imaging   CT ABDOMEN WITH IV CONTRAST     INDICATION:   Other specified disorders of kidney and ureter.      COMPARISON: MRI abdomen dated 4/16/2024.     TECHNIQUE:  CT examination of the abdomen was performed after the administration of intravenous contrast. Multiplanar 2D reformatted images were created from the source data.     This examination, like all CT scans performed in the [facility], was performed utilizing techniques to minimize radiation dose exposure, including the use of iterative reconstruction and automated exposure control.  Radiation dose length   product (DLP) for this visit:     IV Contrast:   - iohexol (OMNIPAQUE) 350 MG/ML injection (SINGLE-DOSE) 100 mL  Enteric Contrast:  Enteric contrast was not administered.     FINDINGS:     LOWER CHEST:  Atelectatic changes are noted at the lung bases.   Partly visualized pacer leads in the right atrium and right ventricle.     LIVER/BILIARY TREE:  Liver is enlarged measuring 19 cm. Diffuse hepatic steatosis. No focal hepatic lesions are noted.  Hepatic contours are within normal limits.  No biliary dilatation.     GALLBLADDER: 3 mm gallbladder polyp versus stone. No pericholecystic inflammatory change.     SPLEEN:  Unremarkable.     PANCREAS: Redemonstration of cluster of of cystic lesions in the pancreatic head measuring up to 17 mm image 62 series 2. Pancreas is mildly atrophic. No pancreatic duct dilation.     ADRENAL GLANDS:  Unremarkable.     KIDNEYS/URETERS: 1.8 x 1.5 cm enhancing lesion in the mid left kidney with washout on delayed images. No renal vein or renal collecting system involvement.. No hydronephrosis.     VISUALIZED STOMACH AND BOWEL:  Unremarkable.     ABDOMINAL  CAVITY:  No ascites.  No pneumoperitoneum. No lymphadenopathy.     VESSELS:  Unremarkable for patient's age.     ABDOMINAL WALL:  Unremarkable.     OSSEOUS STRUCTURES:  There are age appropriate degenerative changes. No acute fracture or destructive osseous lesion.     IMPRESSION:     1. 1.8 x 1.5 cm heterogeneously enhancing lesion in the mid left kidney with washout on delayed images suggestive of renal cell carcinoma. No renal vein or renal collecting system involvement. No lymphadenopathy.      2. Hepatomegaly with steatosis.     3. 3 mm gallbladder polyp versus stone. Confirmation can be obtained with abdominal ultrasound.     4. Redemonstration of cluster of cystic lesions in the pancreatic head recently evaluated with MRI. No pancreatic duct dilation. Continued surveillance as per MRI recommendations.        Electronically signed: 05/23/2024 10:08 AM MD Navdeep Delatorre PA-C  Date: 6/11/2024 Time: 1:53 PM  [facility]    This note was written using fluency dictation software. Please excuse any resulting minor grammatical errors.

## 2024-06-14 ENCOUNTER — OFFICE VISIT (OUTPATIENT)
Dept: PULMONOLOGY | Facility: CLINIC | Age: 79
End: 2024-06-14
Payer: MEDICARE

## 2024-06-14 VITALS
SYSTOLIC BLOOD PRESSURE: 134 MMHG | HEART RATE: 77 BPM | WEIGHT: 152 LBS | BODY MASS INDEX: 27.97 KG/M2 | HEIGHT: 62 IN | DIASTOLIC BLOOD PRESSURE: 82 MMHG | TEMPERATURE: 97.6 F | OXYGEN SATURATION: 99 %

## 2024-06-14 DIAGNOSIS — J47.9 BRONCHIECTASIS WITHOUT COMPLICATION (HCC): Primary | ICD-10-CM

## 2024-06-14 DIAGNOSIS — R05.3 CHRONIC COUGH: ICD-10-CM

## 2024-06-14 DIAGNOSIS — J30.1 SEASONAL ALLERGIC RHINITIS DUE TO POLLEN: ICD-10-CM

## 2024-06-14 DIAGNOSIS — J31.0 CHRONIC RHINITIS: ICD-10-CM

## 2024-06-14 DIAGNOSIS — I50.32 CHRONIC DIASTOLIC CHF (CONGESTIVE HEART FAILURE) (HCC): ICD-10-CM

## 2024-06-14 PROCEDURE — 99214 OFFICE O/P EST MOD 30 MIN: CPT | Performed by: NURSE PRACTITIONER

## 2024-06-14 RX ORDER — TIOTROPIUM BROMIDE INHALATION SPRAY 3.12 UG/1
2 SPRAY, METERED RESPIRATORY (INHALATION) DAILY
Qty: 4 G | Refills: 2 | Status: SHIPPED | OUTPATIENT
Start: 2024-06-14

## 2024-06-14 RX ORDER — BENZONATATE 100 MG/1
100 CAPSULE ORAL 3 TIMES DAILY PRN
Qty: 20 CAPSULE | Refills: 0 | Status: SHIPPED | OUTPATIENT
Start: 2024-06-14 | End: 2024-06-15

## 2024-06-14 NOTE — PROGRESS NOTES
Pulmonary Follow-Up Note   Farnaz Martin 78 y.o. female MRN: [id number]  6/15/2024      Assessment/Plan:    Problem List Items Addressed This Visit       Allergic rhinitis     Continue Zyrtec and nasal spray daily  Decrease allergen triggers         Relevant Medications    tiotropium (Spiriva Respimat) 2.5 MCG/ACT AERS inhaler    Chronic rhinitis     Continue ipratropium nasal spray and Zyrtec         Relevant Medications    tiotropium (Spiriva Respimat) 2.5 MCG/ACT AERS inhaler    Chronic diastolic CHF (congestive heart failure) (HCC)    Relevant Medications    benzonatate (TESSALON PERLES) 100 mg capsule    Chronic cough     Likely secondary to mild bronchiectasis and seasonal allergies    Currently well-controlled on Mucomyst nebulized solution, nasal spray and Tessalon Perles           Relevant Medications    tiotropium (Spiriva Respimat) 2.5 MCG/ACT AERS inhaler    Bronchiectasis without complication (HCC) - Primary    Relevant Medications    benzonatate (TESSALON PERLES) 100 mg capsule    tiotropium (Spiriva Respimat) 2.5 MCG/ACT AERS inhaler       Return in about 4 months (around 10/14/2024).    All of Farnaz's questions were answered prior to leaving the office today. She will follow-up in 4 months or sooner should the need arise. She is aware to call our office with any further questions or concerns.    History of Present Illness   Reason for Visit: Follow up  Chief Complaint: cough  HPI: Farnaz Martin is a 78 y.o. female who presents to the office today for hospital follow-up.  Patient had 2 recent hospital admissions 05/20 for A-fib and 05/24 for a fall at home with worsening oxygen requirements and cough treated for mild bronchiectasis exacerbation, discharged with steroid taper.    Presented to the office today overall feeling well.   Endorses overall improvement in her breathing and cough.  Has intermittent episodes of SOB and cough likely secondary to bronchiectasis.  Is currently using Atrovent  inhaler we will switch to Spiriva due to insurance coverage.  Denies any fever chills night sweats chest pain or hemoptysis    Has persistent allergy symptoms.  Plans to be more consistent with Zyrtec and Flonase  Chronic cough likely secondary to mild bronchiectasis.  Will continue to use inhalers and Tessalon Perles    Review of Systems   Constitutional:  Negative for chills and fever.   Respiratory:  Positive for cough. Negative for shortness of breath and wheezing.        Historical Information   Past Medical History:   Diagnosis Date    Actinic keratosis     last assessed - 06Jun2014    Arthritis     Atrial fibrillation with rapid ventricular response (HCC)     last assessed - 26Apr2016    Atrial fibrillation with RVR (HCC)     Basal cell carcinoma     Benign colon polyp     last assessed - 27Apr2015    Bronchiectasis without complication (HCC)     CHF (congestive heart failure) (HCC) 06/2022    Chronic diastolic CHF (congestive heart failure) ([facility])     Disease of thyroid gland     Effusion of knee joint right     Resolved - 19Apr2016    Esophageal reflux     Fibromyalgia     Fibromyalgia     last assessed - [id number]    Fibromyalgia, primary     GERD (gastroesophageal reflux disease)     Hyperlipidemia     Hypertension     Hyponatremia     Malignant neoplasm of thyroid gland (HCC)     Meningioma (HCC)     MGUS (monoclonal gammopathy of unknown significance)     Palpitations     last assessed - 30Apr2013    Peroneal tendonitis, right     last assessed - [id number]    Right lumbar radiculopathy 03/17/2016    Thyroid cancer (HCC)     Thyroid nodule     Trochanteric bursitis of left hip 03/09/2018     Past Surgical History:   Procedure Laterality Date    CARDIAC ELECTROPHYSIOLOGY STUDY AND ABLATION  08/2022    CATARACT EXTRACTION Bilateral     COLONOSCOPY  03/2018    COLONOSCOPY W/ POLYPECTOMY  2021    repeat in 5 years    EYE SURGERY      HYSTERECTOMY      JOINT REPLACEMENT Left     knee    OOPHORECTOMY      KS  NEUROPLASTY &/TRANSPOS MEDIAN NRV CARPAL TUNNE Right 11/14/2019    Procedure: RELEASE CARPAL TUNNEL;  Surgeon: Onesimo Tate MD;  Location: [facility];  Service: Orthopedics    SD TOTAL THYROID LOBECTOMY UNI W/WO ISTHMUSECTOMY Left 12/16/2020    Procedure: Left THYROID LOBECTOMY;  Surgeon: Jhony Bhatt MD;  Location: [facility];  Service: ENT    RECTAL POLYPECTOMY      REPLACEMENT TOTAL KNEE Left     last assessed - [id number]    THYROID LOBECTOMY Left     TONSILLECTOMY      TOTAL ABDOMINAL HYSTERECTOMY      TUBAL LIGATION      US GUIDED THYROID BIOPSY  10/14/2020     Family History   Problem Relation Age of Onset    Heart disease Mother     Diabetes Mother     Heart disease Father     Coronary artery disease Father     Stroke Father         cerebrovascular accident    Heart attack Father         myocardial infarction    Sudden death Father         scd    Other Family         Back disorder    Coronary artery disease Family     Neuropathy Family     Osteoporosis Family     No Known Problems Daughter     No Known Problems Maternal Grandmother     No Known Problems Maternal Grandfather     No Known Problems Paternal Grandmother     No Known Problems Paternal Grandfather     Cancer Maternal Uncle     Breast cancer Maternal Aunt 65    No Known Problems Son     No Known Problems Maternal Aunt     No Known Problems Maternal Aunt     No Known Problems Maternal Aunt     No Known Problems Paternal Aunt     No Known Problems Paternal Aunt     Anuerysm Neg Hx     Clotting disorder Neg Hx     Arrhythmia Neg Hx     Heart failure Neg Hx      Social History   Social History     Substance and Sexual Activity   Alcohol Use Not Currently     Social History     Substance and Sexual Activity   Drug Use Never     Social History     Tobacco Use   Smoking Status Never   Smokeless Tobacco Never     E-Cigarette/Vaping    E-Cigarette Use Never User      E-Cigarette/Vaping Substances    Nicotine No     THC No     CBD No     Flavoring No      Other No     Unknown No        Meds/Allergies     Current Outpatient Medications:     acetylcysteine (MUCOMYST) 10 % nebulizer solution, Take 4 mL by nebulization every 4 (four) hours as needed (for bronchiectasis), Disp: , Rfl:     acetylcysteine (MUCOMYST) 200 mg/mL nebulizer solution, Take 4 mL (800 mg total) by nebulization every 4 (four) hours, Disp: 180 mL, Rfl: 5    albuterol (ProAir HFA) 90 mcg/act inhaler, Inhale 2 puffs every 6 (six) hours as needed for wheezing, Disp: 8.5 g, Rfl: 3    apixaban (ELIQUIS) 5 mg, Take 1 tablet (5 mg total) by mouth 2 (two) times a day, Disp: 60 tablet, Rfl: 3    apixaban (ELIQUIS) 5 mg, Take 5 mg by mouth 2 (two) times a day, Disp: , Rfl:     ascorbic Acid (VITAMIN C) 500 MG CPCR, Take 500 mg by mouth daily, Disp: , Rfl:     ascorbic acid (VITAMIN C) 500 mg tablet, Take 500 mg by mouth daily , Disp: , Rfl:     benzonatate (TESSALON PERLES) 100 mg capsule, Take 1 capsule (100 mg total) by mouth 3 (three) times a day as needed for cough, Disp: 20 capsule, Rfl: 0    Cartia  MG 24 hr capsule, take 1 capsule by mouth twice a day .YOU MAKE TAKE AN ADDITIONAL CAPSULE DAILY IF YOU HAVE PALPITATIONS AND ELEVATED HEART RATE CONSISTENT WITH YOUR A FIB. MAX 3 CAPS DAILY, Disp: 270 capsule, Rfl: 1    Cetirizine HCl (ZyrTEC Allergy) 10 MG CAPS, Take 10 mg by mouth in the morning, Disp: , Rfl:     Cholecalciferol 50 MCG (2000 UT) CAPS, Take 2,000 Units by mouth 2 (two) times a day, Disp: , Rfl:     Chromium Picolinate (CHROMIUM PICOLATE PO), Take 1 tablet by mouth daily, Disp: , Rfl:     dapagliflozin 5 MG TABS, Take 1 tablet (5 mg total) by mouth daily, Disp: 30 tablet, Rfl: 5    diltiazem (CARDIZEM CD) 120 mg 24 hr capsule, Take 120 mg by mouth 2 (two) times a day, Disp: , Rfl:     ezetimibe (ZETIA) 10 mg tablet, take 1 tablet by mouth once daily, Disp: 90 tablet, Rfl: 3    ezetimibe (ZETIA) 10 mg tablet, Take 1 tablet (10 mg total) by mouth daily at bedtime, Disp: 30 tablet, Rfl:  0    flecainide (TAMBOCOR) 100 mg tablet, Take 1 tablet (100 mg total) by mouth 2 (two) times a day, Disp: 60 tablet, Rfl: 0    flecainide (TAMBOCOR) 150 MG tablet, Take 1 tablet (150 mg total) by mouth 2 (two) times a day (Patient taking differently: Take 100 mg by mouth 2 (two) times a day), Disp: 60 tablet, Rfl: 0    furosemide (LASIX) 20 mg tablet, Take 1 tablet (20 mg total) by mouth daily take 2 tablet by mouth daily if needed for shortness of breath WEIGHT GAIN 3 LBS IN 1 DAY OR LOWER LEG SWELLING, Disp: 90 tablet, Rfl: 3    furosemide (LASIX) 20 mg tablet, Take 1 tablet (20 mg total) by mouth daily, Disp: 30 tablet, Rfl: 0    gabapentin (NEURONTIN) 300 mg capsule, take 1 capsule by mouth at bedtime, Disp: 90 capsule, Rfl: 1    gabapentin (NEURONTIN) 300 mg capsule, Take 300 mg by mouth daily at bedtime, Disp: , Rfl:     ipratropium (ATROVENT) 0.03 % nasal spray, 2 sprays into each nostril 3 (three) times a day Begin once per day, then progress to twice per day. Progress to three times a day as tolerated. (Patient taking differently: 2 sprays into each nostril if needed Begin once per day, then progress to twice per day. Progress to three times a day as tolerated.), Disp: 90 mL, Rfl: 3    metoprolol succinate (TOPROL-XL) 100 mg 24 hr tablet, Take 1 tablet (100 mg total) by mouth every 12 (twelve) hours, Disp: 180 tablet, Rfl: 3    metoprolol succinate (TOPROL-XL) 100 mg 24 hr tablet, Take 100 mg by mouth 2 (two) times a day, Disp: , Rfl:     predniSONE 10 mg tablet, , Disp: , Rfl:     rOPINIRole (REQUIP XL) 2 MG 24 hr tablet, Take 2 mg by mouth daily at bedtime, Disp: , Rfl:     rOPINIRole (REQUIP) 1 mg tablet, Take 2 tablets (2 mg total) by mouth daily at bedtime, Disp: 180 tablet, Rfl: 1    sodium chloride 3 % inhalation solution, Take 4 mL by nebulization as needed for cough, Disp: 152 mL, Rfl: 2    tiotropium (Spiriva Respimat) 2.5 MCG/ACT AERS inhaler, Inhale 2 puffs daily, Disp: 4 g, Rfl: 2    TURMERIC  "PO, Take 1 capsule by mouth 2 (two) times a day, Disp: , Rfl:     zolpidem (AMBIEN) 10 mg tablet, Take 1 tablet (10 mg total) by mouth daily at bedtime as needed for sleep, Disp: 90 tablet, Rfl: 1  Allergies   Allergen Reactions    Sotalol Other (See Comments)     Prolonged QTc leading to torsades de pointes     Penicillins Other (See Comments)     As a child calcium deposit in the arm     Ace Inhibitors GI Intolerance     Did feel well on it    Omeprazole Abdominal Pain, Rash and Vomiting     stomach pain, vomiting, rash       Vitals: Blood pressure 134/82, pulse 77, temperature 97.6 °F (36.4 °C), temperature source Temporal, height 5' 2\" (1.575 m), weight 68.9 kg (152 lb), last menstrual period 02/01/1990, SpO2 99%, not currently breastfeeding. Body mass index is 27.8 kg/m². Oxygen Therapy  SpO2: 99 %    Physical Exam:  Physical Exam  Vitals reviewed.   Constitutional:       General: She is not in acute distress.     Appearance: She is not ill-appearing.   HENT:      Head: Normocephalic and atraumatic.      Right Ear: External ear normal.      Left Ear: External ear normal.      Nose: Nose normal.      Mouth/Throat:      Mouth: Mucous membranes are moist.      Pharynx: Oropharynx is clear.   Eyes:      Extraocular Movements: Extraocular movements intact.      Pupils: Pupils are equal, round, and reactive to light.   Cardiovascular:      Rate and Rhythm: Normal rate and regular rhythm.      Pulses: Normal pulses.      Heart sounds: Normal heart sounds.   Abdominal:      General: Bowel sounds are normal.   Musculoskeletal:      Cervical back: Normal range of motion and neck supple.   Neurological:      Mental Status: She is alert.       Imaging and other studies: I have personally reviewed pertinent reports.     Chest x-ray 05/24/2024 shows diffuse interstitial thickening bilaterally suspicious for interstitial edema.  Small bilateral pleural effusions    Pulmonary Results (PFTs, PSG): I have personally reviewed " "pertinent reports.    PFTs 02/08/2024 show mild diffuse defect.  FEV1 normal FVC normal FEV1 FVC ratio normal     JENNY Enriquez  [facility]    Portions of the record may have been created with voice recognition software.  Occasional wrong word or \"sound a like\" substitutions may have occurred due to the inherent limitations of voice recognition software.  Read the chart carefully and recognize, using context, where substitutions have occurred or contact the dictating provider.  "

## 2024-06-14 NOTE — PATIENT INSTRUCTIONS
Switched Atrovent to Spiriva  Continue nebulizers and albuterol as needed  Can use tessalon perles for cough  Follow up in 4 months

## 2024-06-14 NOTE — ASSESSMENT & PLAN NOTE
Recent hospitalization discharged on prednisone taper  Switched Atrovent to Spiriva due to insurance  Mucomyst 10% nebulized solution every 4 hours as needed  Albuterol 90 mcg/ACT 2 puff every 6 hours-rarely uses rescue inhaler    PFTs February 2024 show mild diffuse defect FEV1 FVC and ratio all normal    Can consider advancing inhaler if symptoms become worse.  Currently patient endorses being mostly asymptomatic

## 2024-06-15 RX ORDER — BENZONATATE 100 MG/1
100 CAPSULE ORAL 3 TIMES DAILY PRN
Qty: 20 CAPSULE | Refills: 0 | Status: SHIPPED | OUTPATIENT
Start: 2024-06-15
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
## 2024-06-15 NOTE — ASSESSMENT & PLAN NOTE
Likely secondary to mild bronchiectasis and seasonal allergies    Currently well-controlled on Mucomyst nebulized solution, nasal spray and Tessalon Perles

## 2024-06-16 PROBLEM — I48.0 PAROXYSMAL ATRIAL FIBRILLATION (HCC): Chronic | Status: ACTIVE | Noted: 2024-05-26

## 2024-06-16 PROBLEM — I36.1 NONRHEUMATIC TRICUSPID VALVE REGURGITATION: Status: RESOLVED | Noted: 2023-07-03 | Resolved: 2024-06-16

## 2024-06-16 PROBLEM — I50.32 CHRONIC HEART FAILURE WITH PRESERVED EJECTION FRACTION (HCC): Chronic | Status: ACTIVE | Noted: 2021-01-03

## 2024-06-16 PROBLEM — Z86.79 HISTORY OF SICK SINUS SYNDROME: Chronic | Status: ACTIVE | Noted: 2021-03-01

## 2024-06-16 PROBLEM — I50.32 CHRONIC DIASTOLIC CHF (CONGESTIVE HEART FAILURE) (HCC): Status: ACTIVE | Noted: 2021-01-03

## 2024-06-16 PROBLEM — I50.32 CHRONIC DIASTOLIC CHF (CONGESTIVE HEART FAILURE) (HCC): Status: ACTIVE | Noted: 2022-06-29

## 2024-06-16 PROBLEM — R60.0 BILATERAL LEG EDEMA: Status: RESOLVED | Noted: 2024-05-06 | Resolved: 2024-06-16

## 2024-06-16 PROBLEM — I35.1 NONRHEUMATIC AORTIC VALVE INSUFFICIENCY: Status: RESOLVED | Noted: 2023-07-03 | Resolved: 2024-06-16

## 2024-06-17 ENCOUNTER — IN-CLINIC DEVICE VISIT (OUTPATIENT)
Dept: CARDIOLOGY CLINIC | Facility: CLINIC | Age: 79
End: 2024-06-17
Payer: MEDICARE

## 2024-06-17 ENCOUNTER — OFFICE VISIT (OUTPATIENT)
Dept: CARDIOLOGY CLINIC | Facility: CLINIC | Age: 79
End: 2024-06-17
Payer: MEDICARE

## 2024-06-17 VITALS
SYSTOLIC BLOOD PRESSURE: 112 MMHG | WEIGHT: 154 LBS | OXYGEN SATURATION: 99 % | HEART RATE: 64 BPM | BODY MASS INDEX: 28.17 KG/M2 | DIASTOLIC BLOOD PRESSURE: 72 MMHG

## 2024-06-17 DIAGNOSIS — I10 ESSENTIAL HYPERTENSION: ICD-10-CM

## 2024-06-17 DIAGNOSIS — I48.91 ATRIAL FIBRILLATION/FLUTTER (HCC): ICD-10-CM

## 2024-06-17 DIAGNOSIS — Z95.0 CARDIAC PACEMAKER IN SITU: Primary | ICD-10-CM

## 2024-06-17 DIAGNOSIS — I50.32 CHRONIC HEART FAILURE WITH PRESERVED EJECTION FRACTION (HCC): Primary | ICD-10-CM

## 2024-06-17 DIAGNOSIS — I48.92 ATRIAL FIBRILLATION/FLUTTER (HCC): ICD-10-CM

## 2024-06-17 PROCEDURE — 99214 OFFICE O/P EST MOD 30 MIN: CPT | Performed by: PHYSICIAN ASSISTANT

## 2024-06-17 PROCEDURE — 93280 PM DEVICE PROGR EVAL DUAL: CPT | Performed by: INTERNAL MEDICINE

## 2024-06-17 RX ORDER — FAMOTIDINE 20 MG/1
20 TABLET, FILM COATED ORAL 2 TIMES DAILY
COMMUNITY

## 2024-06-17 NOTE — PATIENT INSTRUCTIONS
Continue current medications.   Removed duplicate medications from chart.     Please weigh yourself every day (after emptying your bladder) and keep a detailed log of weights.   Contact the Heart Failure program at [phone] if you gain 3+ lbs overnight or 5+ lbs in 5-7 days.  Limit daily sodium/salt intake to 2000 mg daily to prevent fluid retention.  Avoid canned foods, fast food/Chinese food, and processed meats (hot dogs, lunch meat, and sausage etc.). Caution with condiments.  Limit fluid intake to 2000 mL or 2 liters (about 60-65 ounces) daily.  Avoid electrolyte replacement drinks (such as Gatorade, Pedialyte, Propel, Liquid IV, etc.).  Bring complete list of medications and log of daily weights to your follow-up appointment.

## 2024-06-17 NOTE — PROGRESS NOTES
General Cardiology Outpatient Visit    Farnaz Martin 78 y.o. female   MRN: [id number]  Encounter: [id number]    Assessment:  Patient Active Problem List    Diagnosis Date Noted    Paroxysmal atrial fibrillation (HCC) 05/26/2024    Bronchiectasis (HCC) 05/26/2024    Falls 05/24/2024    Ambulatory dysfunction 05/24/2024    Hyponatremia 05/24/2024    Left renal mass 04/22/2024    Chronic cough 01/25/2024    Bronchiectasis without complication (HCC) 01/25/2024    Multiple thyroid nodules 01/13/2024    Abnormal CT of the chest 08/04/2023    S/P revision of total knee, left 07/31/2023    Nonrheumatic mitral valve regurgitation 07/03/2023    Hyponatremia 06/13/2022    Meningioma (HCC) 05/13/2022    Chronic tension-type headache, not intractable 03/29/2022    Vasomotor rhinitis 12/16/2021    MGUS (monoclonal gammopathy of unknown significance) 06/28/2021    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    Tremors of nervous system 04/01/2021    Hx of diplopia 04/01/2021    S/P placement of cardiac pacemaker 03/24/2021    History of sick sinus syndrome s/p PPM 03/2021    Current use of long term anticoagulation 02/04/2021    Malignant neoplasm of thyroid gland (HCC) 01/11/2021    Chronic heart failure with preserved ejection fraction (HCC) 01/03/2021    Chronic rhinitis 11/10/2020    Arthritis of both acromioclavicular joints 03/06/2020    Carpal tunnel syndrome on right 11/01/2019    Osteoarthritis of shoulder     TMJ syndrome 10/18/2017    Essential hypertension 04/19/2016    Peripheral neuropathy 03/07/2016    Lumbar spondylosis 06/29/2015    Lumbar stenosis 06/29/2015    Restless legs syndrome 07/09/2014    Allergic rhinitis 04/01/2013    Osteoarthritis of knee 04/01/2013    Insomnia 01/04/2013    Osteopenia 11/26/2012    Fibromyalgia 10/16/2012    Hyperlipidemia 10/16/2012    Osteoarthrosis, hand 10/16/2012    Vitamin D deficiency 10/16/2012       Today's Plan:  Continue current medications.   Removed duplicates of  "cardiac medications from medication list.   Will contact device clinic to see if patient's in-person device check can be moved to today.    Plan:  Chronic HFpEF; LVEF 55%  TTE 05/25/2024: \"LVEF 55%, mod to severe MR, mild to mod TR, est RAP 10 mmHg.\"     Weight of 160 lbs on 05/29. Today, weighs 154 lbs.    Most recent BMP from 05/27/2024: sodium 129; potassium 3.9; BUN 22; creatinine 0.78; eGFR 72.    Pharmacotherapies:  --Aldosterone Antagonist: No.   --SGLT2 Inhibitor: dapagliflozin 5 mg daily.   --Diuretic: Lasix 20 mg daily.     Atrial fibrillation / flutter    [id number] = 5 (age, sex, HF, HTN).   Anticoagulation on Eliquis.    S/p ablation in 03/2021.    Rate control: diltiazem 120 mg BID and metoprolol succinate 100 mg BID.    Rhythm control: flecainide 100 mg BID.    Hypertension   BP of 112/72 mmHg in office today.   Continue on medications as above.    History of torsades susan pointes: in setting of QT-prolongation with sotalol s/p DCCV during ablation.  Renal mass, left  Bronchiectasis  History of sick sinus syndrome: s/p DC PPM in 03/2021.  History of thyroid cancer    HPI:   Farnaz Martin is a 78-year-old woman with a PMH as above who presents to the office for follow-up. Follows with Dr. Cunningham.     05/29/2024 with TH: \"78-year-old female with past medical history described above who presents for hospital follow-up with her daughter.  He was originally admitted on 5/20 at the [facility] with palpitations due to atrial tachycardia.  She was placed on flecainide 150 mg twice daily.  She then presented back to the [facility] after a fall at home. Also felt to be volume overloaded and diagnosed with bronchiectasis.  He was treated with IV diuretics and steroids respectively.  Device interrogation showed her to be in normal sinus rhythm with no significant events.  She was discharged home on her usual dose of Lasix 20 mg daily.  Her flecainide dose was also reduced to 100 mg twice daily given " "concern for side effects on higher dosing. She is feeling well with exception of fatigue. Had a few break through palpitations since discharge but nothing sustained. Otherwise no other concerns or symptoms. She will likely need surgical intervention of a renal mass over next few weeks to months and will need cardiac risk assessment.\"    06/17/2024: Patient presents for follow-up, and patient's daughter, Coral, on speaker phone throughout visit. Patient continues with intermittent palpitations lasting a few minutes at a time with no associated symptoms. Denies lightheadedness, CP, diaphoresis, SOB at rest, PND, and orthopnea. Reports some lower BP readings at home (SBP in low 90-110s).     Past Medical History:   Diagnosis Date    Actinic keratosis     last assessed - 06Jun2014    Arthritis     Atrial fibrillation with rapid ventricular response (HCC)     last assessed - 26Apr2016    Atrial fibrillation with rapid ventricular response (HCC) 04/19/2016    Basal cell carcinoma     Benign colon polyp     last assessed - 27Apr2015    Bronchiectasis without complication (HCC)     CHF (congestive heart failure) (HCC) 06/2022    Chronic diastolic CHF (congestive heart failure) (HCC)     Disease of thyroid gland     Effusion of knee joint right     Resolved - 19Apr2016    Esophageal reflux     Fibromyalgia     last assessed - [id number]    Fibromyalgia, primary     GERD (gastroesophageal reflux disease)     Hyperlipidemia     Hypertension     Hyponatremia     Malignant neoplasm of thyroid gland (HCC)     Meningioma (HCC)     MGUS (monoclonal gammopathy of unknown significance)     Palpitations     last assessed - 30Apr2013    Peroneal tendonitis, right     last assessed - [id number]    Right lumbar radiculopathy 03/17/2016    Thyroid cancer (HCC)     Thyroid nodule     Trochanteric bursitis of left hip 03/09/2018     Review of Systems   Constitutional:  Negative for activity change, appetite change, fatigue and unexpected " weight change.   Respiratory:  Positive for shortness of breath (with exertion). Negative for cough and chest tightness.    Cardiovascular:  Positive for palpitations. Negative for chest pain and leg swelling.   Gastrointestinal:  Negative for abdominal distention, abdominal pain and nausea.   Genitourinary:  Negative for decreased urine volume, dysuria and urgency.   Musculoskeletal: Negative.    Skin: Negative.    Neurological:  Negative for dizziness, syncope, weakness and light-headedness.   Psychiatric/Behavioral:  Negative for confusion and sleep disturbance. The patient is not nervous/anxious.      Allergies   Allergen Reactions    Sotalol Other (See Comments)     Prolonged QTc leading to torsades de pointes     Penicillins Other (See Comments)     As a child calcium deposit in the arm     Ace Inhibitors GI Intolerance     Did feel well on it    Omeprazole Abdominal Pain, Rash and Vomiting     stomach pain, vomiting, rash         Current Outpatient Medications:     acetylcysteine (MUCOMYST) 10 % nebulizer solution, Take 4 mL by nebulization every 4 (four) hours as needed (for bronchiectasis), Disp: , Rfl:     acetylcysteine (MUCOMYST) 200 mg/mL nebulizer solution, Take 4 mL (800 mg total) by nebulization every 4 (four) hours, Disp: 180 mL, Rfl: 5    albuterol (ProAir HFA) 90 mcg/act inhaler, Inhale 2 puffs every 6 (six) hours as needed for wheezing, Disp: 8.5 g, Rfl: 3    apixaban (ELIQUIS) 5 mg, Take 1 tablet (5 mg total) by mouth 2 (two) times a day, Disp: 60 tablet, Rfl: 3    ascorbic Acid (VITAMIN C) 500 MG CPCR, Take 500 mg by mouth daily, Disp: , Rfl:     benzonatate (TESSALON PERLES) 100 mg capsule, Take 1 capsule (100 mg total) by mouth 3 (three) times a day as needed for cough, Disp: 20 capsule, Rfl: 0    Cetirizine HCl (ZyrTEC Allergy) 10 MG CAPS, Take 10 mg by mouth in the morning, Disp: , Rfl:     Cholecalciferol 50 MCG (2000 UT) CAPS, Take 2,000 Units by mouth 2 (two) times a day, Disp: , Rfl:      Chromium Picolinate (CHROMIUM PICOLATE PO), Take 1 tablet by mouth daily, Disp: , Rfl:     dapagliflozin 5 MG TABS, Take 1 tablet (5 mg total) by mouth daily, Disp: 30 tablet, Rfl: 5    diltiazem (CARDIZEM CD) 120 mg 24 hr capsule, Take 120 mg by mouth 2 (two) times a day, Disp: , Rfl:     ezetimibe (ZETIA) 10 mg tablet, Take 1 tablet (10 mg total) by mouth daily at bedtime, Disp: 30 tablet, Rfl: 0    famotidine (PEPCID) 20 mg tablet, Take 20 mg by mouth 2 (two) times a day, Disp: , Rfl:     flecainide (TAMBOCOR) 100 mg tablet, Take 1 tablet (100 mg total) by mouth 2 (two) times a day, Disp: 60 tablet, Rfl: 0    furosemide (LASIX) 20 mg tablet, Take 1 tablet (20 mg total) by mouth daily take 2 tablet by mouth daily if needed for shortness of breath WEIGHT GAIN 3 LBS IN 1 DAY OR LOWER LEG SWELLING, Disp: 90 tablet, Rfl: 3    gabapentin (NEURONTIN) 300 mg capsule, take 1 capsule by mouth at bedtime, Disp: 90 capsule, Rfl: 1    ipratropium (ATROVENT) 0.03 % nasal spray, 2 sprays into each nostril 3 (three) times a day Begin once per day, then progress to twice per day. Progress to three times a day as tolerated. (Patient taking differently: 2 sprays into each nostril if needed Begin once per day, then progress to twice per day. Progress to three times a day as tolerated.), Disp: 90 mL, Rfl: 3    metoprolol succinate (TOPROL-XL) 100 mg 24 hr tablet, Take 1 tablet (100 mg total) by mouth every 12 (twelve) hours, Disp: 180 tablet, Rfl: 3    predniSONE 10 mg tablet, , Disp: , Rfl:     rOPINIRole (REQUIP) 1 mg tablet, Take 2 tablets (2 mg total) by mouth daily at bedtime, Disp: 180 tablet, Rfl: 1    sodium chloride 3 % inhalation solution, Take 4 mL by nebulization as needed for cough, Disp: 152 mL, Rfl: 2    tiotropium (Spiriva Respimat) 2.5 MCG/ACT AERS inhaler, Inhale 2 puffs daily, Disp: 4 g, Rfl: 2    TURMERIC PO, Take 1 capsule by mouth 2 (two) times a day, Disp: , Rfl:     zolpidem (AMBIEN) 10 mg tablet, Take 1  tablet (10 mg total) by mouth daily at bedtime as needed for sleep, Disp: 90 tablet, Rfl: 1    gabapentin (NEURONTIN) 300 mg capsule, Take 300 mg by mouth daily at bedtime (Patient not taking: Reported on 6/17/2024), Disp: , Rfl:     Social History     Socioeconomic History    Marital status:      Spouse name: Not on file    Number of children: Not on file    Years of education: Not on file    Highest education level: Not on file   Occupational History    Not on file   Tobacco Use    Smoking status: Never    Smokeless tobacco: Never   Vaping Use    Vaping status: Never Used   Substance and Sexual Activity    Alcohol use: Not Currently    Drug use: Never    Sexual activity: Not Currently   Other Topics Concern    Not on file   Social History Narrative    ** Merged History Encounter **          Social Determinants of Health     Financial Resource Strain: Patient Unable To Answer (12/19/2023)    Overall Financial Resource Strain (CARDIA)     Difficulty of Paying Living Expenses: Patient unable to answer   Food Insecurity: No Food Insecurity (5/24/2024)    Hunger Vital Sign     Worried About Running Out of Food in the Last Year: Never true     Ran Out of Food in the Last Year: Never true   Transportation Needs: No Transportation Needs (5/24/2024)    PRAPARE - Transportation     Lack of Transportation (Medical): No     Lack of Transportation (Non-Medical): No   Physical Activity: Not on file   Stress: Not on file   Social Connections: Not on file   Intimate Partner Violence: Not At Risk (7/31/2023)    [facility]    Humiliation, Afraid, Rape, and Kick questionnaire     Fear of Current or Ex-Partner: No     Emotionally Abused: No     Physically Abused: No     Sexually Abused: No   Housing Stability: Low Risk  (5/24/2024)    Housing Stability Vital Sign     Unable to Pay for Housing in the Last Year: No     Number of Times Moved in the Last Year: 1      Homeless in the Last Year: No     Family History   Problem Relation Age of Onset    Heart disease Mother     Diabetes Mother     Heart disease Father     Coronary artery disease Father     Stroke Father         cerebrovascular accident    Heart attack Father         myocardial infarction    Sudden death Father         scd    Other Family         Back disorder    Coronary artery disease Family     Neuropathy Family     Osteoporosis Family     No Known Problems Daughter     No Known Problems Maternal Grandmother     No Known Problems Maternal Grandfather     No Known Problems Paternal Grandmother     No Known Problems Paternal Grandfather     Cancer Maternal Uncle     Breast cancer Maternal Aunt 65    No Known Problems Son     No Known Problems Maternal Aunt     No Known Problems Maternal Aunt     No Known Problems Maternal Aunt     No Known Problems Paternal Aunt     No Known Problems Paternal Aunt     Anuerysm Neg Hx     Clotting disorder Neg Hx     Arrhythmia Neg Hx     Heart failure Neg Hx        Vitals:   Blood pressure 112/72, pulse 64, weight 69.9 kg (154 lb), last menstrual period 02/01/1990, SpO2 99%, not currently breastfeeding.    Wt Readings from Last 10 Encounters:   06/17/24 69.9 kg (154 lb)   06/14/24 68.9 kg (152 lb)   06/11/24 67.6 kg (149 lb)   05/31/24 71.4 kg (157 lb 8 oz)   05/29/24 72.7 kg (160 lb 3.2 oz)   05/27/24 72.2 kg (159 lb 2.8 oz)   05/22/24 72.8 kg (160 lb 7.9 oz)   05/13/24 71.2 kg (157 lb)   05/06/24 71.7 kg (158 lb)   04/16/24 68 kg (150 lb)     Vitals:    06/17/24 1041   BP: 112/72   BP Location: Left arm   Patient Position: Sitting   Cuff Size: Standard   Pulse: 64   SpO2: 99%   Weight: 69.9 kg (154 lb)       Physical Exam  Vitals reviewed.   Constitutional:       General: She is awake. She is not in acute distress.     Appearance: Normal appearance. She is well-developed. She is not ill-appearing, toxic-appearing or diaphoretic.   HENT:      Head: Normocephalic.      Nose: Nose  normal.      Mouth/Throat:      Mouth: Mucous membranes are moist.   Eyes:      General: No scleral icterus.     Conjunctiva/sclera: Conjunctivae normal.   Neck:      Vascular: No JVD.      Trachea: No tracheal deviation.   Cardiovascular:      Rate and Rhythm: Normal rate and regular rhythm.      Heart sounds: Murmur heard.   Pulmonary:      Effort: Pulmonary effort is normal. No tachypnea, bradypnea or respiratory distress.      Breath sounds: Normal air entry. No wheezing or rales.   Abdominal:      General: There is no distension.      Palpations: Abdomen is soft.   Musculoskeletal:      Cervical back: Neck supple.      Right lower leg: No edema.      Left lower leg: No edema.   Skin:     General: Skin is warm and dry.      Coloration: Skin is not jaundiced or pale.   Neurological:      General: No focal deficit present.      Mental Status: She is alert and oriented to person, place, and time.   Psychiatric:         Attention and Perception: Attention normal.         Mood and Affect: Mood normal. Affect is flat.         Speech: Speech normal.         Behavior: Behavior normal. Behavior is cooperative.         Thought Content: Thought content normal.       Labs & Results:  Lab Results   Component Value Date    WBC 8.30 05/25/2024    HGB 10.5 (L) 05/25/2024    HCT 31.5 (L) 05/25/2024    MCV 92 05/25/2024     05/25/2024     Lab Results   Component Value Date    SODIUM 129 (L) 05/27/2024    K 3.9 05/27/2024    CL 96 05/27/2024    CO2 26 05/27/2024    BUN 22 05/27/2024    CREATININE 0.78 05/27/2024    GLUC 156 (H) 05/27/2024    CALCIUM 9.0 05/27/2024     Lab Results   Component Value Date    INR 1.62 (H) 05/24/2024    INR 1.21 (H) 01/22/2024    INR 1.26 (H) 01/05/2024    PROTIME 20.0 (H) 05/24/2024    PROTIME 15.2 (H) 01/22/2024    PROTIME 16.5 (H) 01/05/2024     Lab Results   Component Value Date     (H) 01/22/2024      Mindy Lawler PA-C

## 2024-06-17 NOTE — PROGRESS NOTES
Results for orders placed or performed in visit on 06/17/24   Cardiac EP device report    Narrative    MDT-DUAL CHAMBER PPM (AAIR-DDDR MODE)/ ACTIVE SYSTEM IS MRI CONDITIONAL  DEVICE INTERROGATED IN THE [place] OFFICE. BATTERY VOLTAGE ADEQUATE (10.2 YRS). AP-56%, -68% (>40% DDIR@60PPM). ALL LEAD PARAMETERS WITHIN NORMAL LIMITS. 60 AT/AF EPISODES & CURRENTLY IN AT/AFLUTTER. AT/AF BURDEN SINCE 5/24/24-2.4%. PT ON ELIQUIS, DILTIAZEM, FLECAINIDE & METOPROLOL. PT SEEN BY CHRISS DUVAL. NORMAL DEVICE FUNCTION. GV

## 2024-06-19 ENCOUNTER — TELEPHONE (OUTPATIENT)
Age: 79
End: 2024-06-19

## 2024-06-19 NOTE — TELEPHONE ENCOUNTER
Received call from pt's daughter.  She states pt continues to go in and out of atrial flutter and atrial tachycardia, notes pt to feel the fluttering intermittently.  Denies chest pain, denies shortness of breath.  Pt is feeling increased tiredness from it.    Daughter is asking Dr. Cunningham to advise if patient can increase her Flecainide back to 150 mg BID from current dose 100 mg BID, stating pt was stable on the higher dose.  BP today was 111/60, daughter unsure of pulse.    Please advise.

## 2024-06-20 ENCOUNTER — TELEPHONE (OUTPATIENT)
Age: 79
End: 2024-06-20

## 2024-06-20 NOTE — TELEPHONE ENCOUNTER
Call received from Luis A with [facility] stating that she is taking Flecainide 150mg prescribed by her cardiologist.

## 2024-06-24 DIAGNOSIS — I48.0 PAROXYSMAL ATRIAL FIBRILLATION (HCC): ICD-10-CM

## 2024-06-24 RX ORDER — FLECAINIDE ACETATE 100 MG/1
100 TABLET ORAL 2 TIMES DAILY
Qty: 60 TABLET | Refills: 0 | Status: CANCELLED | OUTPATIENT
Start: 2024-06-24

## 2024-06-26 ENCOUNTER — TELEMEDICINE (OUTPATIENT)
Dept: INTERVENTIONAL RADIOLOGY/VASCULAR | Facility: CLINIC | Age: 79
End: 2024-06-26
Payer: MEDICARE

## 2024-06-26 DIAGNOSIS — N28.89 LEFT RENAL MASS: Primary | ICD-10-CM

## 2024-06-26 DIAGNOSIS — I48.0 PAROXYSMAL ATRIAL FIBRILLATION (HCC): ICD-10-CM

## 2024-06-26 PROCEDURE — 99442 PR PHYS/QHP TELEPHONE EVALUATION 11-20 MIN: CPT | Performed by: RADIOLOGY

## 2024-06-26 NOTE — PROGRESS NOTES
Virtual Brief Visit    This Visit is being completed by telephone. The Patient is located at Home and in the following state in which I hold an active license [place]    The patient was identified by name and date of birth. Farnaz Martin was informed that this is a telemedicine visit and that the visit is being conducted through Telephone.  My office door was closed. No one else was in the room.  She acknowledged consent and understanding of privacy and security of the video platform. The patient has agreed to participate and understands they can discontinue the visit at any time.    Patient is aware this is a billable service.       Assessment/Plan:    Problem List Items Addressed This Visit          Urinary    Left renal mass - Primary     I had a pleasant conversation with the patient and her daughter over the phone.  We discussed that the left renal mass shows stability between 5/14/2024 and 3/26/2024 and the renal mass measures 1.7 cm.  Assessment for stability is limited by the short interval.  Again, we we revisited the pros and cons of treating the renal mass with cryoablation.  I discussed with them that I typically perform this procedure with general anesthesia.  They expressed concern with the risks associated with general esthesia given her cardiac history.  She is currently undergoing evaluation of potential treatment for arrhythmia and she has an upcoming appointment with an electrophysiologist.  I expressed understanding and I think it is reasonable to have her see EP and see if they can further optimize her from a cardiac standpoint.  In the meantime, I have placed an order for CT abdomen with contrast in 3 months and I will set up another appointment to talk to her about the results.  By then we will have longer interval follow-up data to assess stability.  All questions/concerns addressed.                 Relevant Orders    CT abdomen w contrast       Recent Visits  Date Type Provider Dept   06/30/24  Telephone MD Galo Craven   06/26/24 Telephone MD Galo Banks   06/26/24 Telemedicine MD Galo Banks   Showing recent visits within past 7 days and meeting all other requirements  Future Appointments  No visits were found meeting these conditions.  Showing future appointments within next 150 days and meeting all other requirements         Visit Time  Total Visit Duration: 15 minutes

## 2024-06-26 NOTE — LETTER
July 1, 2024     Arturo Mcgowan MD  [address]  [address]  [facility] [address]    Patient: Farnaz Martin   YOB: 1945   Date of Visit: 6/26/2024       Dear Dr. Mcgowan:    Thank you for referring Farnaz Martin to me for evaluation. Below are my notes for this consultation.    If you have questions, please do not hesitate to call me. I look forward to following your patient along with you.         Sincerely,        Anna Grullon MD        CC: MD Anna Craven MD  7/1/2024 10:23 AM  Sign when Signing Visit  Virtual Brief Visit    This Visit is being completed by telephone. The Patient is located at Home and in the following state in which I hold an active license [place]    The patient was identified by name and date of birth. Farnaz Martin was informed that this is a telemedicine visit and that the visit is being conducted through Telephone.  My office door was closed. No one else was in the room.  She acknowledged consent and understanding of privacy and security of the video platform. The patient has agreed to participate and understands they can discontinue the visit at any time.    Patient is aware this is a billable service.       Assessment/Plan:    Problem List Items Addressed This Visit          Urinary    Left renal mass - Primary     I had a pleasant conversation with the patient and her daughter over the phone.  We discussed that the left renal mass shows stability between 5/14/2024 and 3/26/2024 and the renal mass measures 1.7 cm.  Assessment for stability is limited by the short interval.  Again, we we revisited the pros and cons of treating the renal mass with cryoablation.  I discussed with them that I typically perform this procedure with general anesthesia.  They expressed concern with the risks associated with general esthesia given her cardiac history.  She is currently undergoing evaluation of potential treatment for arrhythmia and she has an upcoming  appointment with an electrophysiologist.  I expressed understanding and I think it is reasonable to have her see EP and see if they can further optimize her from a cardiac standpoint.  In the meantime, I have placed an order for CT abdomen with contrast in 3 months and I will set up another appointment to talk to her about the results.  By then we will have longer interval follow-up data to assess stability.  All questions/concerns addressed.                 Relevant Orders    CT abdomen w contrast       Recent Visits  Date Type Provider Dept   06/30/24 Telephone MD Galo Craven   06/26/24 Telephone MD Galo Banks   06/26/24 Telemedicine MD Galo Banks   Showing recent visits within past 7 days and meeting all other requirements  Future Appointments  No visits were found meeting these conditions.  Showing future appointments within next 150 days and meeting all other requirements         Visit Time  Total Visit Duration: 15 minutes

## 2024-06-26 NOTE — TELEPHONE ENCOUNTER
Reason for call:   [x] Refill   [] Prior Auth  [] Other:     Office:   [] PCP/Provider -   [x] Specialty/Provider - Frances Lawler PA-C  Cardiology      Medication:   flecainide (TAMBOCOR) 100 mg tablet 100 mg, 2 times daily # 60           Pharmacy: [id number] - [place] - [address]      Does the patient have enough for 3 days?   [] Yes   [x] No - Send as HP to POD

## 2024-06-26 NOTE — TELEPHONE ENCOUNTER
IR Clinic Follow-Up:    Patient clinical visit in 3 month.    Schedule visit for the first available IR Physician: No                *If no, schedule for:   IR Physician: Yadi    Type of Visit: Virtual Brief Visit - Telephone    Additional Details: review CT abdomen scan.  Make sure the CT I ordered is done before this appt.

## 2024-06-28 ENCOUNTER — APPOINTMENT (OUTPATIENT)
Dept: LAB | Facility: CLINIC | Age: 79
End: 2024-06-28
Payer: MEDICARE

## 2024-06-28 DIAGNOSIS — I10 ESSENTIAL HYPERTENSION: ICD-10-CM

## 2024-06-28 LAB
ANION GAP SERPL CALCULATED.3IONS-SCNC: 10 MMOL/L (ref 4–13)
BUN SERPL-MCNC: 24 MG/DL (ref 5–25)
CALCIUM SERPL-MCNC: 9.5 MG/DL (ref 8.4–10.2)
CHLORIDE SERPL-SCNC: 98 MMOL/L (ref 96–108)
CO2 SERPL-SCNC: 26 MMOL/L (ref 21–32)
CREAT SERPL-MCNC: 0.98 MG/DL (ref 0.6–1.3)
ERYTHROCYTE [DISTWIDTH] IN BLOOD BY AUTOMATED COUNT: 13.8 % (ref 11.6–15.1)
EST. AVERAGE GLUCOSE BLD GHB EST-MCNC: 206 MG/DL
GFR SERPL CREATININE-BSD FRML MDRD: 55 ML/MIN/1.73SQ M
GLUCOSE P FAST SERPL-MCNC: 140 MG/DL (ref 65–99)
HBA1C MFR BLD: 8.8 %
HCT VFR BLD AUTO: 38.3 % (ref 34.8–46.1)
HGB BLD-MCNC: 12.7 G/DL (ref 11.5–15.4)
MCH RBC QN AUTO: 31.1 PG (ref 26.8–34.3)
MCHC RBC AUTO-ENTMCNC: 33.2 G/DL (ref 31.4–37.4)
MCV RBC AUTO: 94 FL (ref 82–98)
PLATELET # BLD AUTO: 261 THOUSANDS/UL (ref 149–390)
PMV BLD AUTO: 10.1 FL (ref 8.9–12.7)
POTASSIUM SERPL-SCNC: 4.6 MMOL/L (ref 3.5–5.3)
RBC # BLD AUTO: 4.08 MILLION/UL (ref 3.81–5.12)
SODIUM SERPL-SCNC: 134 MMOL/L (ref 135–147)
WBC # BLD AUTO: 5.69 THOUSAND/UL (ref 4.31–10.16)

## 2024-06-28 PROCEDURE — 85027 COMPLETE CBC AUTOMATED: CPT

## 2024-06-28 PROCEDURE — 36415 COLL VENOUS BLD VENIPUNCTURE: CPT

## 2024-06-28 PROCEDURE — 80048 BASIC METABOLIC PNL TOTAL CA: CPT

## 2024-06-28 RX ORDER — FLECAINIDE ACETATE 100 MG/1
100 TABLET ORAL 2 TIMES DAILY
Qty: 60 TABLET | Refills: 5 | Status: SHIPPED | OUTPATIENT
Start: 2024-06-28

## 2024-06-30 ENCOUNTER — TELEPHONE (OUTPATIENT)
Dept: FAMILY MEDICINE CLINIC | Facility: CLINIC | Age: 79
End: 2024-06-30

## 2024-07-01 NOTE — ASSESSMENT & PLAN NOTE
I had a pleasant conversation with the patient and her daughter over the phone.  We discussed that the left renal mass shows stability between 5/14/2024 and 3/26/2024 and the renal mass measures 1.7 cm.  Assessment for stability is limited by the short interval.  Again, we we revisited the pros and cons of treating the renal mass with cryoablation.  I discussed with them that I typically perform this procedure with general anesthesia.  They expressed concern with the risks associated with general esthesia given her cardiac history.  She is currently undergoing evaluation of potential treatment for arrhythmia and she has an upcoming appointment with an electrophysiologist.  I expressed understanding and I think it is reasonable to have her see EP and see if they can further optimize her from a cardiac standpoint.  In the meantime, I have placed an order for CT abdomen with contrast in 3 months and I will set up another appointment to talk to her about the results.  By then we will have longer interval follow-up data to assess stability.  All questions/concerns addressed.

## 2024-07-01 NOTE — TELEPHONE ENCOUNTER
Pt returned missed call from office to discuss lab results:    A.  Relayed results to patient as per provider message:       Your repeat CBC is normal. Hemoglobin is normal at 12.7  Normal range 11.5 to 15.4  this improved from 10.5  You are no longer anemia. Your electrolytes are normal except for your sodium at 134  Normal 135 to 147. This improved from 129.     Patient expressed understanding and had the following question(s): Pt wants to know if there's something she needs to do for her sodium levels?     Please advise.

## 2024-07-01 NOTE — TELEPHONE ENCOUNTER
Call re A1c 8.8 decreased slightly from 8.9  dose of Farixga can be increased from 5 mg daily to 10 mg daily   then repeat A1c in 3 months [facility]     Lab Results   Component Value Date    HGBA1C 8.8 (H) 06/28/2024     Hemoglobin A1C   Date Value Ref Range Status   06/28/2024 8.8 (H) Normal 4.0-5.6%; PreDiabetic 5.7-6.4%; Diabetic >=6.5%; Glycemic control for adults with diabetes <7.0% % Final   05/07/2024 8.9 (H) Normal 4.0-5.6%; PreDiabetic 5.7-6.4%; Diabetic >=6.5%; Glycemic control for adults with diabetes <7.0% % Final   01/09/2024 7.6 (H) Normal 4.0-5.6%; PreDiabetic 5.7-6.4%; Diabetic >=6.5%; Glycemic control for adults with diabetes <7.0% % Final

## 2024-07-02 DIAGNOSIS — E87.1 HYPONATREMIA: Primary | ICD-10-CM

## 2024-07-03 ENCOUNTER — TELEPHONE (OUTPATIENT)
Age: 79
End: 2024-07-03

## 2024-07-03 DIAGNOSIS — R80.9 TYPE 2 DIABETES MELLITUS WITH MICROALBUMINURIA, WITHOUT LONG-TERM CURRENT USE OF INSULIN (HCC): Primary | ICD-10-CM

## 2024-07-03 DIAGNOSIS — E11.29 TYPE 2 DIABETES MELLITUS WITH MICROALBUMINURIA, WITHOUT LONG-TERM CURRENT USE OF INSULIN (HCC): Primary | ICD-10-CM

## 2024-07-03 NOTE — TELEPHONE ENCOUNTER
PT is requesting  a refill of farxiga, which is not listed on her current med list. She said she was on 5mg and that Dr Monsivais wanted her bumped up to 10mg.PT requesting Farxiga 10mg sent to [facility] [address] [place] [address] ALEKSANDER Shaffer.    Please advise    Thank you

## 2024-07-03 NOTE — TELEPHONE ENCOUNTER
Patient is requesting a referral to an Endocrinologist in [place] as she is not feeling well and thinks it might be related to her blood sugar. She is extremely tired and yesterday she was very lightheaded and it seemed to be affecting her vision.

## 2024-07-05 ENCOUNTER — NURSE TRIAGE (OUTPATIENT)
Age: 79
End: 2024-07-05

## 2024-07-05 NOTE — TELEPHONE ENCOUNTER
"Reason for Disposition   Fatigue (i.e., tires easily, decreased energy) and persists > 1 week    Answer Assessment - Initial Assessment Questions  1. DESCRIPTION: \"Describe how you are feeling.\"      Very fatigued ,dizzy at times ,can do normal activities at times  but gets exhausted after a couple of minutes.   2. SEVERITY: \"How bad is it?\"  \"Can you stand and walk?\"    - MILD - Feels weak or tired, but does not interfere with work, school or normal activities    - MODERATE - Able to stand and walk; weakness interferes with work, school, or normal activities    - SEVERE - Unable to stand or walk      Moderate   3. ONSET:  \"When did the weakness begin?\"      Couple of weeks ago  4. CAUSE: \"What do you think is causing the weakness?\"      Unknown   5. MEDICINES: \"Have you recently started a new medicine or had a change in the amount of a medicine?\"      Farxiga increased in dosage   6. OTHER SYMPTOMS: \"Do you have any other symptoms?\" (e.g., chest pain, fever, cough, SOB, vomiting, diarrhea, bleeding, other areas of pain)      Blood pressure 104/66 today  ,appetite decreased mild dizziness .Daughter declined all other symptoms.    Daughter called and stated patient has been feeling fatigued and dizzy ,daughter stated that the farxiga was recently increased to 10 mg.  Appt scheduled for  next week.Advised daughter if symptoms change .patient  should be evaluated at the ER or UC.    Protocols used: Weakness (Generalized) and Fatigue-ADULT-[place]    "

## 2024-07-09 NOTE — TELEPHONE ENCOUNTER
Called patient spoke with patient,asked patient to call her insurance company to see what is cover under her plan that way  can send the correct medication for her.  Patient understood and agreed. also informed her once she has that information to call the office back with it. Patient understood.

## 2024-07-09 NOTE — TELEPHONE ENCOUNTER
"Patient called back stating she spoke with her insurance again but is getting a run around as to alternatives for farxiga. She is in the \"donut hole\" with her insurance which is why the medication is more expensive. She is requesting for the farxiga 10mg to be sent to [facility] unless the doctor has a better alternative in mind.   "

## 2024-07-09 NOTE — TELEPHONE ENCOUNTER
Pt returned call. Pt called her insurance but did not get the information requested. Pt seemed confused. She is going to try to call her insurance company back again to get the information.

## 2024-07-09 NOTE — TELEPHONE ENCOUNTER
Pt called in stating Farxiga will cost her $140. She would like to know if there are any alternatives that are less expensive. She's requesting a call back.

## 2024-07-11 ENCOUNTER — TELEPHONE (OUTPATIENT)
Dept: FAMILY MEDICINE CLINIC | Facility: CLINIC | Age: 79
End: 2024-07-11

## 2024-07-11 ENCOUNTER — OFFICE VISIT (OUTPATIENT)
Dept: FAMILY MEDICINE CLINIC | Facility: CLINIC | Age: 79
End: 2024-07-11
Payer: MEDICARE

## 2024-07-11 VITALS
WEIGHT: 162.5 LBS | TEMPERATURE: 97.8 F | HEIGHT: 62 IN | RESPIRATION RATE: 18 BRPM | OXYGEN SATURATION: 96 % | DIASTOLIC BLOOD PRESSURE: 74 MMHG | HEART RATE: 70 BPM | BODY MASS INDEX: 29.9 KG/M2 | SYSTOLIC BLOOD PRESSURE: 132 MMHG

## 2024-07-11 DIAGNOSIS — D47.2 MGUS (MONOCLONAL GAMMOPATHY OF UNKNOWN SIGNIFICANCE): ICD-10-CM

## 2024-07-11 DIAGNOSIS — R53.82 CHRONIC FATIGUE: Primary | ICD-10-CM

## 2024-07-11 DIAGNOSIS — E11.29 TYPE 2 DIABETES MELLITUS WITH MICROALBUMINURIA, WITHOUT LONG-TERM CURRENT USE OF INSULIN (HCC): ICD-10-CM

## 2024-07-11 DIAGNOSIS — J47.9 BRONCHIECTASIS WITHOUT COMPLICATION (HCC): ICD-10-CM

## 2024-07-11 DIAGNOSIS — E87.1 HYPONATREMIA: ICD-10-CM

## 2024-07-11 DIAGNOSIS — I48.0 PAROXYSMAL ATRIAL FIBRILLATION (HCC): Chronic | ICD-10-CM

## 2024-07-11 DIAGNOSIS — I10 ESSENTIAL HYPERTENSION: ICD-10-CM

## 2024-07-11 DIAGNOSIS — Z86.79 HISTORY OF SICK SINUS SYNDROME: Chronic | ICD-10-CM

## 2024-07-11 DIAGNOSIS — I50.32 CHRONIC HEART FAILURE WITH PRESERVED EJECTION FRACTION (HCC): Chronic | ICD-10-CM

## 2024-07-11 DIAGNOSIS — N28.89 LEFT RENAL MASS: ICD-10-CM

## 2024-07-11 DIAGNOSIS — I34.0 NONRHEUMATIC MITRAL VALVE REGURGITATION: ICD-10-CM

## 2024-07-11 DIAGNOSIS — R80.9 TYPE 2 DIABETES MELLITUS WITH MICROALBUMINURIA, WITHOUT LONG-TERM CURRENT USE OF INSULIN (HCC): ICD-10-CM

## 2024-07-11 LAB — GLUCOSE SERPL-MCNC: 183 MG/DL (ref 65–140)

## 2024-07-11 PROCEDURE — G2211 COMPLEX E/M VISIT ADD ON: HCPCS | Performed by: FAMILY MEDICINE

## 2024-07-11 PROCEDURE — 99215 OFFICE O/P EST HI 40 MIN: CPT | Performed by: FAMILY MEDICINE

## 2024-07-11 RX ORDER — BLOOD SUGAR DIAGNOSTIC
STRIP MISCELLANEOUS
Qty: 100 EACH | Refills: 3 | Status: SHIPPED | OUTPATIENT
Start: 2024-07-11

## 2024-07-11 RX ORDER — LANCETS 33 GAUGE
EACH MISCELLANEOUS
Qty: 100 EACH | Refills: 3 | Status: SHIPPED | OUTPATIENT
Start: 2024-07-11

## 2024-07-11 RX ORDER — INSULIN GLARGINE 100 [IU]/ML
10 INJECTION, SOLUTION SUBCUTANEOUS
Qty: 15 ML | Refills: 1 | Status: SHIPPED | OUTPATIENT
Start: 2024-07-11 | End: 2025-05-07

## 2024-07-11 NOTE — TELEPHONE ENCOUNTER
Called pharmacy to confirm if test strips were order for patient, they were not informed .  Asked  how he orders test strips for the pharmacy, he was able to show  and I.  sent  test strips

## 2024-07-11 NOTE — PROGRESS NOTES
Ambulatory Visit  Name: Farnaz Martin      : 1945      MRN: [id number]  Encounter Provider: Naveen Monsivais MD  Encounter Date: 2024   Encounter department: [facility]    Assessment & Plan   1. Chronic fatigue  2. Hyponatremia  3. Essential hypertension  4. Type 2 diabetes mellitus with microalbuminuria, without long-term current use of insulin (HCC)  -     Insulin Glargine Solostar (Lantus SoloStar) 100 UNIT/ML SOPN; Inject 0.1 mL (10 Units total) under the skin daily at bedtime  -     Ambulatory referral to clinical pharmacy; Future  -     B-Type Natriuretic Peptide(BNP); Future  -     Fingerstick Glucose (POCT)  -     Insulin Pen Needle 31G X 4 MM MISC; Use daily at bedtime  -     One Touch Verio IQ  -     glucose blood (OneTouch Verio) test strip; Check blood sugars once daily. Please substitute with appropriate alternative as covered by patient's insurance. Dx: E11.65  -     OneTouch Delica Lancets 33G MISC; Check blood sugars once daily. Please substitute with appropriate alternative as covered by patient's insurance. Dx: E11.65  -     Hemoglobin A1C; Future; Expected date: 10/12/2024  5. Chronic heart failure with preserved ejection fraction (HCC)  6. Nonrheumatic mitral valve regurgitation  7. Paroxysmal atrial fibrillation (HCC)  -     Flecainide level; Future  8. Bronchiectasis without complication (HCC)  9. MGUS (monoclonal gammopathy of unknown significance)  10. Left renal mass  11. History of sick sinus syndrome s/p PPM    Decrease dose generic Cardizem CD to 120 mg daily. Continue with Metoprolol  mg BID. Monitor BP at home.    Continue with Farixga 5 mg/day. Add Lantus 10 units at HS. Daily fasting fingerstick blood sugars. Call results to office in 1 week. Clinical pharmacy referral to discuss medication management, CGM coverage. Patient is scheduled to see Endocrinology 2024     Repeat BMP. I included BNP, Flecainide level     Pulmonary Medicine re  evaluation this month     OV 6 to 8 weeks        History of Present Illness     Patient presents with significant fatigue. Symptoms worse when of dose Farixga increased from 5 mg/day to 10 mg/day. She cut back to 5 mg/day. Repeat A1c 8.8 decreased from 8.9. No hypoglycemic events. No recent illnesses. Appetite normal. No weight loss. She has had a number of low BP readings at home with SBP 90 to 100 range. No dizziness. No chest pain or palpitations. At times SOB with exertion. No orthopnea or PND.     Type 2 DM 05/2024  A1c 8.9 increased from  7.6. 01/2024 Urine albumin 21.2-no longer on ARB. No DPN. Current with eye exam.      Hypertension  on Metoprolol  mg BID and Cardizem  mg BID. 062024 creatinine 0.98. GFR 55. Electrolytes normal except for Na+  134. . Corrected Na+ 135. Hgb 12,7.       Hyperlipidemia on Zetia 10 mg daily. 01/2024 lipid profile Cholesterol 123.  Triglycerides 63.  HDL 46.  LDL 64 05/2024 TSH 2.108      Admission 5/24 to 5/27/2024 Status post fall.  Cough and shortness of breath known bronchiectasis.  Chronically low sodium.  Chest x-ray diffuse interstitial thickening bilaterally suspicious for interstitial edema.  Small bilateral pleural effusions.  CT scan head no acute intracranial abnormality.  Small rounded hyperdense foci in left frontal temporal region likely represents calcifications.  CT cervical spine no cervical spine fracture or traumatic malalignment.  Incidental thyroid nodule.  Interlobular septal thickening in the lung apices suspicious for interstitial edema.  EKG AV dual paced rhythm with prolonged AV conduction.  Echocardiogram left ventricular cavity size normal.  Wall thickness normal.  Left ventricular ejection fraction 55%.  Wall motion normal.  Right ventricular cavity size normal.  Right ventricular systolic function normal.  Trace AR.  Moderate to severe MR.  Mild to moderate TR.  Right ventricular systolic pressure severely elevated.  Left pleural  effusion.  Patient was discharged on tapering course of Prednisone with improved cough.  Dose of Flecainide decreased to 100 mg twice a day.  On Lasix 20 mg daily. Discharge sodium 129.  Potassium 3.9.  Creatinine 0.78.  GFR 72.  Hemoglobin 10.5. PT evaluation prior to discharge        Admission 5/20/2024 to 5/22/2024 diagnosis atrial fibrillation with rapid ventricular response.  Patient presented with palpitations and exertional dyspnea.  Status post pacemaker.  Pacemaker interrogation-atrial tachycardia. Troponins negative. CXR  Prominent interstitial markings with probable small pleural effusions consistent with mild pulmonary edema. Patient was noted to be hyponatremic on admission sodium 128 improving to 130 with IV saline.    02/2024 Urology evaluation for microscopic hematuria. 03/2024 cystoscopy normal. 02/2024 urine culture + Proteus. CT renal protocol 1.7 cm enhancing mass arising from the posterior medial aspect of the left kidney midpole highly suspicious for malignancy.  Subtle haziness of perivesicular fat which may represent a mild cystitis.  No discrete bladder lesions.  Hypodense lesion present within the pancreatic head measuring up to 2.5 cm with a multicystic appearance possibly representing a serocystadenoma.  Mildly enlarged left external iliac chain lymph node.  Left lower lobe pulmonary nodule 3.5 mm stable from cardiac CT 2/2021.  Superficial density seen measuring 1.5 cm near the inferior portion of right breast not appreciably changed from 8/2023.  Mammogram 3/2024 normal    05/2024 repeat CT scan 1.8 x 1.5 cm heterogeneously enhancing lesion in the mid left kidney with washout on delayed images suggestive of renal cell carcinoma. No renal vein or renal collecting system involvement. No lymphadenopathy.   Hepatomegaly with steatosis. 3 mm gallbladder polyp versus stone.  Redemonstration of cluster of cystic lesions in the pancreatic head recently evaluated with MRI. No pancreatic duct  dilation.      She was seen by IR re cryo ablation procedure for renal mass. This was deferred. Patient and daughter opted not to pursue GI evaluation re cystic lesions pancreas          01/2024 admission 01/05/2024 to 01/06/2024 Dx palpitations associated with shortness of breath. History of AF w/ 2 prior ablations-s/p pacemaker.  Device interrogation 1/5/2024; sinus tach, AP 91%,  7% normal device function. Admission EKG Electronic ventricular pacemaker.Troponin negative. CTA no PE.  CBC normal. Hgb 12.5. CMP normal except for Na+ 129 and chloride 93. LFTs normal. TSH 3.644. Sinus tachycardia fluid responsive. She advised at discharge to use additional once per day Cardizem  mg as needed for tachycardia. Repeat out patient sodium 128. . Corrected Na+ 129. Now on fluid restriction.       08/2023 admission Diagnosis atrial fibrillation with rapid ventricular response.  2 prior ablations with pacemaker.  Chronic diastolic CHF, acute blood loss secondary to recent left TKR and hyponatremia. 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  CTA no acute PE.  Mild heterogeneous liver with slight lobulation along the left lobe.  Incidental thyroid nodule.  Venous Dopplers lower extremities no DVT.  Abdominal ultrasound hepatomegaly.  No evidence of cirrhosis or liver mass.  6 mm gallbladder polyp.  Troponin negative.  Elevated BNP at 651.  TSH 2.439.  Admission electrolytes normal for sodium 132.  LFTs normal except for total bilirubin 1.07.  Hemoglobin 10.0.  Patient was treated with IV Cardizem and started on oral Flecainide.  She ultimately underwent cardioversion.  Preprocedure SHANNAN Normal left ventricular systolic function.  EF 65%.  Wall motion normal.  Right ventricle normal.  Left atrium moderately dilated.  No thrombus.  No PFO.  Left atrial appendage dilated.  Normal function.  No thrombus.  Moderate TR.                Lab Results   Component Value Date    WBC 5.69 06/28/2024     HGB 12.7 06/28/2024    HCT 38.3 06/28/2024    MCV 94 06/28/2024     06/28/2024     Lab Results   Component Value Date    SODIUM 134 (L) 06/28/2024    K 4.6 06/28/2024    CL 98 06/28/2024    CO2 26 06/28/2024    BUN 24 06/28/2024    CREATININE 0.98 06/28/2024    GLUC 156 (H) 05/27/2024    CALCIUM 9.5 06/28/2024     Lab Results   Component Value Date    HGBA1C 8.8 (H) 06/28/2024     Lab Results   Component Value Date    TRJ7AYAXASVQ 2.108 05/20/2024     Lab Results   Component Value Date    ALT 36 05/20/2024    AST 23 05/20/2024    ALKPHOS 80 05/20/2024    BILITOT 0.78 05/06/2015           Review of Systems   Constitutional:  Positive for fatigue and unexpected weight change (10 lb weight gain from 06/2024). Negative for appetite change, chills and fever.   HENT:  Positive for hearing loss (bilateral hearing aids ). Negative for congestion, ear pain, rhinorrhea, sore throat and trouble swallowing.    Eyes:  Negative for visual disturbance.   Respiratory:  Positive for cough. Negative for shortness of breath and wheezing.         See HPI Chronic cough non productive. No nasal congestion or postnasal drainage.  On daily Zyrtec.  No reflux symptoms on daily PPI.  No history of asthma.  No prior allergy testing. 01/2024 CTA no PE. Lingular segment left upper lobe discoid atelectasis. Mild bibasilar dependent changes noted. Right greater than left bibasilar mild bronchiectasis. 1/2024 PFTs mild diffusion defect.  No significant response to inhaled bronchodilator.   Cardiovascular:  Negative for chest pain, palpitations and leg swelling.        See HPI.    06/2022 admission for CHF, hyponatremia with altered mental status.  Chest x-ray shows small bilateral effusions. .  Sodium prior to admission 125.  On repeat 128 in the setting of volume overload.  Patient was treated with IV diuresis with symptomatic improvement.  Discharge sodium 136.   Mental status improved back to baseline.  Prior  cardio versions.      03/2021 admission for atrial fibrillation status post ablation procedure. Pre procedure episode of bradycardia with QT prolongation and torsades successfully defibrillated.  Subsequent pacemaker placed. Repeat ablation 08/2022 at [place].        Gastrointestinal:  Negative for abdominal pain, blood in stool, constipation, diarrhea, nausea and vomiting.        See HPI.  12/2023 u/s elastography E median:  6.56 kPa. Corresponding Metavir score is F0-F1(absent or mild fibrosis), range 0-8.26kPa. <1.66 m/s. IQR/median:  14.7 %  Colonoscopy 04/2018 Two benign polyps.  Mild diverticulosis left-sided colon.    Lab Results       Component                Value               Date                       ALT                      36                  05/20/2024                 AST                      23                  05/20/2024                 ALKPHOS                  80                  05/20/2024                 BILITOT                  0.78                05/06/2015               Endocrine: Negative for polydipsia and polyuria.        See HPI.  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.  Repeat thyroid u/s 12/2023  3 additional nodules of lesser size and/or TI-RADS score.  These do not necessitate additional evaluation based on ACR criteria.      09/2018 DEXA scan T-score -1.5 left femoral neck. On vitamin D supplement    Lab Results       Component                Value               Date                       [id number]             2.108               05/20/2024                                                                    Genitourinary:  Negative for difficulty urinating.   Musculoskeletal:  Negative for arthralgias and myalgias.        See HPI. OA shoulders.  07/2019 x-rays right shoulder mild degenerative arthritis right AC joint.  Left shoulder mild degenerative changes  left AC joint.  08/2019 bilateral shoulder intra-articular steroid injections via fluoroscopy with symptomatic relief. S/p bilateral TKR.    Skin:  Negative for rash.   Neurological:  Positive for tremors. Negative for dizziness and headaches.        CNS meningioma. 10/2023 MRI brain Stable meningioma within the left anterior lateral anterior cranial fossa.  Stable advanced chronic microangiopathic change 04/2022 MRI brain 4.2 x 1.7 x 3.0 cm fairly homogeneously enhancing, extra-axial mass within the inferior lateral aspect of the left anterior cranial fossa.  Mild adjacent dural thickening and enhancement is seen extending inferiorly into the middle cranial fossa and superiorly into the dura of the anterior frontal vertex.  Findings are most suggestive of meningioma. [facility]surgical evaluation at [facility] 05/2022. Second opinion at [place].     RLS on Requip. 04/2021 neurology evaluation for tremor suspected benign essential  tremor.  She is on a Beta-blocker and Gabapentin.  Family history + essential tremor brother.    Hematological:  Negative for adenopathy. Does not bruise/bleed easily.        Monoclonal gammopathy. 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. 1/2023 IgG level normal at 905    Lab Results       Component                Value               Date                       WBC                      5.69                06/28/2024                 HGB                      12.7                06/28/2024                 HCT                      38.3                06/28/2024                 MCV                      94                  06/28/2024                 PLT                      261                 06/28/2024                                   Lab Results       Component                Value               Date                       [id number]               799                 07/01/2022                          MMA elevated at 489   Psychiatric/Behavioral:  Negative for dysphoric mood and sleep disturbance.          Chronic insomnia on prn Zolpidem 10 mg               Past Medical History:   Diagnosis Date    Actinic keratosis     last assessed - 06Jun2014    Arthritis     Atrial fibrillation with rapid ventricular response (HCC)     last assessed - 26Apr2016    Atrial fibrillation with rapid ventricular response (HCC) 04/19/2016    Basal cell carcinoma     Benign colon polyp     last assessed - 27Apr2015    Bronchiectasis without complication (HCC)     CHF (congestive heart failure) (HCC) 06/2022    Chronic diastolic CHF (congestive heart failure) (HCC)     Disease of thyroid gland     Effusion of knee joint right     Resolved - 19Apr2016    Esophageal reflux     Fibromyalgia     last assessed - 27Dec2017    Fibromyalgia, primary     GERD (gastroesophageal reflux disease)     Hyperlipidemia     Hypertension     Hyponatremia     Malignant neoplasm of thyroid gland (HCC)     Meningioma (HCC)     MGUS (monoclonal gammopathy of unknown significance)     Palpitations     last assessed - 30Apr2013    Peroneal tendonitis, right     last assessed - 02Oct2013    Right lumbar radiculopathy 03/17/2016    Thyroid cancer (HCC)     Thyroid nodule     Trochanteric bursitis of left hip 03/09/2018     Past Surgical History:   Procedure Laterality Date    CARDIAC ELECTROPHYSIOLOGY STUDY AND ABLATION  08/2022    CATARACT EXTRACTION Bilateral     COLONOSCOPY  03/2018    COLONOSCOPY W/ POLYPECTOMY  2021    repeat in 5 years    EYE SURGERY      OOPHORECTOMY      CT NEUROPLASTY &/TRANSPOS MEDIAN NRV CARPAL TUNNE Right 11/14/2019    Procedure: RELEASE CARPAL TUNNEL;  Surgeon: Onesimo Tate MD;  Location: [facility];  Service: Orthopedics    CT TOTAL THYROID LOBECTOMY UNI W/WO ISTHMUSECTOMY Left 12/16/2020    Procedure: Left THYROID LOBECTOMY;  Surgeon: Jhony Bhatt MD;  Location: [facility];  Service: ENT    RECTAL POLYPECTOMY      REPLACEMENT TOTAL KNEE Left     last assessed - 27Apr2015    TONSILLECTOMY      TOTAL ABDOMINAL HYSTERECTOMY      TUBAL  LIGATION      US GUIDED THYROID BIOPSY  10/14/2020     Family History   Problem Relation Age of Onset    Heart disease Mother     Diabetes Mother     Heart disease Father     Coronary artery disease Father     Stroke Father         cerebrovascular accident    Heart attack Father         myocardial infarction    Sudden death Father         scd    Other Family         Back disorder    Coronary artery disease Family     Neuropathy Family     Osteoporosis Family     No Known Problems Daughter     No Known Problems Maternal Grandmother     No Known Problems Maternal Grandfather     No Known Problems Paternal Grandmother     No Known Problems Paternal Grandfather     Cancer Maternal Uncle     Breast cancer Maternal Aunt 65    No Known Problems Son     No Known Problems Maternal Aunt     No Known Problems Maternal Aunt     No Known Problems Maternal Aunt     No Known Problems Paternal Aunt     No Known Problems Paternal Aunt     Anuerysm Neg Hx     Clotting disorder Neg Hx     Arrhythmia Neg Hx     Heart failure Neg Hx      Social History     Tobacco Use    Smoking status: Never    Smokeless tobacco: Never   Vaping Use    Vaping status: Never Used   Substance and Sexual Activity    Alcohol use: Not Currently    Drug use: Never    Sexual activity: Not Currently     Current Outpatient Medications on File Prior to Visit   Medication Sig    acetylcysteine (MUCOMYST) 10 % nebulizer solution Take 4 mL by nebulization every 4 (four) hours as needed (for bronchiectasis)    acetylcysteine (MUCOMYST) 200 mg/mL nebulizer solution Take 4 mL (800 mg total) by nebulization every 4 (four) hours    albuterol (ProAir HFA) 90 mcg/act inhaler Inhale 2 puffs every 6 (six) hours as needed for wheezing    apixaban (ELIQUIS) 5 mg Take 1 tablet (5 mg total) by mouth 2 (two) times a day    ascorbic Acid (VITAMIN C) 500 MG CPCR Take 500 mg by mouth daily    benzonatate (TESSALON PERLES) 100 mg capsule Take 1 capsule (100 mg total) by mouth 3  (three) times a day as needed for cough    Cetirizine HCl (ZyrTEC Allergy) 10 MG CAPS Take 10 mg by mouth in the morning    Cholecalciferol 50 MCG (2000 UT) CAPS Take 2,000 Units by mouth 2 (two) times a day    Chromium Picolinate (CHROMIUM PICOLATE PO) Take 1 tablet by mouth daily    dapagliflozin 5 MG TABS Take 1 tablet (5 mg total) by mouth daily    diltiazem (CARDIZEM CD) 120 mg 24 hr capsule Take 120 mg by mouth 2 (two) times a day    ezetimibe (ZETIA) 10 mg tablet Take 1 tablet (10 mg total) by mouth daily at bedtime    famotidine (PEPCID) 20 mg tablet Take 20 mg by mouth 2 (two) times a day    flecainide (TAMBOCOR) 100 mg tablet Take 1 tablet (100 mg total) by mouth 2 (two) times a day    furosemide (LASIX) 20 mg tablet Take 1 tablet (20 mg total) by mouth daily take 2 tablet by mouth daily if needed for shortness of breath WEIGHT GAIN 3 LBS IN 1 DAY OR LOWER LEG SWELLING    gabapentin (NEURONTIN) 300 mg capsule take 1 capsule by mouth at bedtime    ipratropium (ATROVENT) 0.03 % nasal spray 2 sprays into each nostril 3 (three) times a day Begin once per day, then progress to twice per day. Progress to three times a day as tolerated. (Patient taking differently: 2 sprays into each nostril if needed Begin once per day, then progress to twice per day. Progress to three times a day as tolerated.)    metoprolol succinate (TOPROL-XL) 100 mg 24 hr tablet Take 1 tablet (100 mg total) by mouth every 12 (twelve) hours    predniSONE 10 mg tablet     rOPINIRole (REQUIP) 1 mg tablet Take 2 tablets (2 mg total) by mouth daily at bedtime    sodium chloride 3 % inhalation solution Take 4 mL by nebulization as needed for cough    tiotropium (Spiriva Respimat) 2.5 MCG/ACT AERS inhaler Inhale 2 puffs daily    TURMERIC PO Take 1 capsule by mouth 2 (two) times a day    zolpidem (AMBIEN) 10 mg tablet Take 1 tablet (10 mg total) by mouth daily at bedtime as needed for sleep    gabapentin (NEURONTIN) 300 mg capsule Take 300 mg by  "mouth daily at bedtime (Patient not taking: Reported on 6/17/2024)     Allergies   Allergen Reactions    Sotalol Other (See Comments)     Prolonged QTc leading to torsades de pointes     Penicillins Other (See Comments)     As a child calcium deposit in the arm     Ace Inhibitors GI Intolerance     Did feel well on it    Omeprazole Abdominal Pain, Rash and Vomiting     stomach pain, vomiting, rash     Immunization History   Administered Date(s) Administered    COVID-19 MODERNA VACC 0.5 ML IM 01/27/2021, 01/27/2021, 02/24/2021, 02/24/2021, 11/23/2021    COVID-19 Moderna Vac BIVALENT 12 Yr+ IM 0.5 ML 12/09/2022    COVID-19 Moderna mRNA Vaccine 12 Yr+ 50 mcg/0.5 mL (Spikevax) 01/02/2024    INFLUENZA 12/23/2015, 12/23/2015, 11/07/2016, 11/07/2016, 10/18/2017, 10/18/2017, 12/04/2018, 12/04/2018, 09/26/2019, 09/08/2020, 11/03/2021, 09/19/2022, 09/19/2022, 12/19/2023    Influenza Split High Dose Preservative Free IM 10/01/2014, 12/23/2015, 11/07/2016, 10/18/2017    Influenza, high dose seasonal 0.7 mL 12/04/2018, 09/26/2019, 09/08/2020, 11/03/2021, 09/19/2022, 12/19/2023    Influenza, seasonal, injectable 10/16/2012    Pneumococcal Conjugate 13-Valent 04/27/2015    Pneumococcal Polysaccharide PPV23 03/29/2022, 03/29/2022    Respiratory Syncytial Virus Vaccine (Recombinant) 02/21/2024     Objective       /74 (BP Location: Left arm, Patient Position: Sitting, Cuff Size: Standard)   Pulse 70   Temp 97.8 °F (36.6 °C)   Resp 18   Ht 5' 2\" (1.575 m)   Wt 73.7 kg (162 lb 8 oz)   LMP 02/01/1990 (Within Weeks)   SpO2 96%   BMI 29.72 kg/m²      BP Readings from Last 3 Encounters:   07/11/24 132/74   06/17/24 112/72   06/14/24 134/82     Wt Readings from Last 3 Encounters:   07/11/24 73.7 kg (162 lb 8 oz)   06/17/24 69.9 kg (154 lb)   06/14/24 68.9 kg (152 lb)            Physical Exam  Constitutional:       General: She is not in acute distress.  HENT:      Head: Atraumatic.      Right Ear: Tympanic membrane and ear " canal normal.      Left Ear: Tympanic membrane and ear canal normal.   Eyes:      General: No scleral icterus.     Extraocular Movements: Extraocular movements intact.      Conjunctiva/sclera: Conjunctivae normal.      Pupils: Pupils are equal, round, and reactive to light.   Neck:      Thyroid: No thyroid mass or thyromegaly.      Vascular: Normal carotid pulses. No carotid bruit or JVD.   Cardiovascular:      Rate and Rhythm: Normal rate and regular rhythm.      Heart sounds: No murmur heard.     No gallop.      Comments: Trace ankle edema   Pulmonary:      Effort: Pulmonary effort is normal.      Breath sounds: Normal breath sounds. No wheezing or rales.   Musculoskeletal:      Right lower leg: Edema present.      Left lower leg: Edema present.   Lymphadenopathy:      Cervical: No cervical adenopathy.   Skin:     Findings: No rash.   Neurological:      General: No focal deficit present.      Mental Status: She is alert and oriented to person, place, and time.   Psychiatric:         Mood and Affect: Mood normal.         Speech: Speech normal.

## 2024-07-12 PROBLEM — E87.1 HYPONATREMIA: Status: RESOLVED | Noted: 2022-06-13 | Resolved: 2024-07-12
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
## 2024-07-12 PROBLEM — R29.6 FALLS: Status: RESOLVED | Noted: 2024-01-01 | Resolved: 2024-01-01

## 2024-07-12 PROBLEM — J47.9 BRONCHIECTASIS (HCC): Status: RESOLVED | Noted: 2024-05-26 | Resolved: 2024-07-12

## 2024-07-13 ENCOUNTER — HOSPITAL ENCOUNTER (INPATIENT)
Facility: HOSPITAL | Age: 79
LOS: 3 days | Discharge: HOME HEALTH | DRG: 186 | End: 2024-07-17
Attending: HOSPITALIST | Admitting: INTERNAL MEDICINE
Payer: MEDICARE

## 2024-07-13 ENCOUNTER — APPOINTMENT (EMERGENCY)
Dept: RADIOLOGY | Facility: HOSPITAL | Age: 79
DRG: 186 | End: 2024-07-13
Payer: MEDICARE

## 2024-07-13 ENCOUNTER — APPOINTMENT (EMERGENCY)
Dept: CT IMAGING | Facility: HOSPITAL | Age: 79
DRG: 186 | End: 2024-07-13
Payer: MEDICARE

## 2024-07-13 DIAGNOSIS — J90 PLEURAL EFFUSION, RIGHT: ICD-10-CM
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
## 2024-07-13 DIAGNOSIS — R55 SYNCOPE AND COLLAPSE: ICD-10-CM

## 2024-07-13 DIAGNOSIS — I48.19 PERSISTENT ATRIAL FIBRILLATION (HCC): ICD-10-CM

## 2024-07-13 DIAGNOSIS — I48.0 PAROXYSMAL ATRIAL FIBRILLATION (HCC): ICD-10-CM

## 2024-07-13 DIAGNOSIS — Z95.0 S/P PLACEMENT OF CARDIAC PACEMAKER: ICD-10-CM

## 2024-07-13 LAB
2HR DELTA HS TROPONIN: 0 NG/L
4HR DELTA HS TROPONIN: 2 NG/L
ABO GROUP BLD: NORMAL
ANION GAP SERPL CALCULATED.3IONS-SCNC: 10 MMOL/L (ref 4–13)
ATRIAL RATE: 25 BPM
BACTERIA UR QL AUTO: ABNORMAL /HPF
BASE EXCESS BLDA CALC-SCNC: -1 MMOL/L (ref -2–3)
BASOPHILS # BLD AUTO: 0.08 THOUSANDS/ÂΜL (ref 0–0.1)
BASOPHILS NFR BLD AUTO: 1 % (ref 0–1)
BILIRUB UR QL STRIP: NEGATIVE
BLD GP AB SCN SERPL QL: NEGATIVE
BUN SERPL-MCNC: 41 MG/DL (ref 5–25)
CA-I BLD-SCNC: 1.2 MMOL/L (ref 1.12–1.32)
CALCIUM SERPL-MCNC: 9.5 MG/DL (ref 8.4–10.2)
CARDIAC TROPONIN I PNL SERPL HS: 6 NG/L
CARDIAC TROPONIN I PNL SERPL HS: 6 NG/L
CARDIAC TROPONIN I PNL SERPL HS: 8 NG/L
CHLORIDE SERPL-SCNC: 95 MMOL/L (ref 96–108)
CK SERPL-CCNC: 68 U/L (ref 26–192)
CLARITY UR: CLEAR
CO2 SERPL-SCNC: 23 MMOL/L (ref 21–32)
COLOR UR: ABNORMAL
CREAT SERPL-MCNC: 1.34 MG/DL (ref 0.6–1.3)
EOSINOPHIL # BLD AUTO: 0.15 THOUSAND/ÂΜL (ref 0–0.61)
EOSINOPHIL NFR BLD AUTO: 2 % (ref 0–6)
ERYTHROCYTE [DISTWIDTH] IN BLOOD BY AUTOMATED COUNT: 13.9 % (ref 11.6–15.1)
EST. AVERAGE GLUCOSE BLD GHB EST-MCNC: 206 MG/DL
GFR SERPL CREATININE-BSD FRML MDRD: 37 ML/MIN/1.73SQ M
GLUCOSE SERPL-MCNC: 148 MG/DL (ref 65–140)
GLUCOSE SERPL-MCNC: 150 MG/DL (ref 65–140)
GLUCOSE SERPL-MCNC: 212 MG/DL (ref 65–140)
GLUCOSE UR STRIP-MCNC: ABNORMAL MG/DL
HBA1C MFR BLD: 8.8 %
HCO3 BLDA-SCNC: 25 MMOL/L (ref 24–30)
HCT VFR BLD AUTO: 40 % (ref 34.8–46.1)
HCT VFR BLD CALC: 39 % (ref 34.8–46.1)
HGB BLD-MCNC: 13.1 G/DL (ref 11.5–15.4)
HGB BLDA-MCNC: 13.3 G/DL (ref 11.5–15.4)
HGB UR QL STRIP.AUTO: NEGATIVE
IMM GRANULOCYTES # BLD AUTO: 0.03 THOUSAND/UL (ref 0–0.2)
IMM GRANULOCYTES NFR BLD AUTO: 0 % (ref 0–2)
KETONES UR STRIP-MCNC: NEGATIVE MG/DL
LEUKOCYTE ESTERASE UR QL STRIP: ABNORMAL
LYMPHOCYTES # BLD AUTO: 1.58 THOUSANDS/ÂΜL (ref 0.6–4.47)
LYMPHOCYTES NFR BLD AUTO: 20 % (ref 14–44)
MCH RBC QN AUTO: 30 PG (ref 26.8–34.3)
MCHC RBC AUTO-ENTMCNC: 32.8 G/DL (ref 31.4–37.4)
MCV RBC AUTO: 92 FL (ref 82–98)
MONOCYTES # BLD AUTO: 0.9 THOUSAND/ÂΜL (ref 0.17–1.22)
MONOCYTES NFR BLD AUTO: 11 % (ref 4–12)
MUCOUS THREADS UR QL AUTO: ABNORMAL
NEUTROPHILS # BLD AUTO: 5.24 THOUSANDS/ÂΜL (ref 1.85–7.62)
NEUTS SEG NFR BLD AUTO: 66 % (ref 43–75)
NITRITE UR QL STRIP: NEGATIVE
NON-SQ EPI CELLS URNS QL MICRO: ABNORMAL /HPF
NRBC BLD AUTO-RTO: 0 /100 WBCS
PCO2 BLD: 26 MMOL/L (ref 21–32)
PCO2 BLD: 46.7 MM HG (ref 42–50)
PH BLD: 7.34 [PH] (ref 7.3–7.4)
PH UR STRIP.AUTO: 5.5 [PH]
PLATELET # BLD AUTO: 281 THOUSANDS/UL (ref 149–390)
PMV BLD AUTO: 9.9 FL (ref 8.9–12.7)
PO2 BLD: 19 MM HG (ref 35–45)
POTASSIUM BLD-SCNC: 4.9 MMOL/L (ref 3.5–5.3)
POTASSIUM SERPL-SCNC: 4.6 MMOL/L (ref 3.5–5.3)
PROT UR STRIP-MCNC: ABNORMAL MG/DL
QRS AXIS: -36 DEGREES
QRSD INTERVAL: 140 MS
QT INTERVAL: 298 MS
QTC INTERVAL: 298 MS
RBC # BLD AUTO: 4.36 MILLION/UL (ref 3.81–5.12)
RBC #/AREA URNS AUTO: ABNORMAL /HPF
RH BLD: POSITIVE
SAO2 % BLD FROM PO2: 26 % (ref 60–85)
SODIUM BLD-SCNC: 129 MMOL/L (ref 136–145)
SODIUM SERPL-SCNC: 128 MMOL/L (ref 135–147)
SP GR UR STRIP.AUTO: >=1.05 (ref 1–1.03)
SPECIMEN EXPIRATION DATE: NORMAL
SPECIMEN SOURCE: ABNORMAL
T WAVE AXIS: 158 DEGREES
UROBILINOGEN UR STRIP-ACNC: <2 MG/DL
VENTRICULAR RATE: 60 BPM
WBC # BLD AUTO: 7.98 THOUSAND/UL (ref 4.31–10.16)
WBC #/AREA URNS AUTO: ABNORMAL /HPF

## 2024-07-13 PROCEDURE — 74177 CT ABD & PELVIS W/CONTRAST: CPT

## 2024-07-13 PROCEDURE — 82948 REAGENT STRIP/BLOOD GLUCOSE: CPT

## 2024-07-13 PROCEDURE — 72125 CT NECK SPINE W/O DYE: CPT

## 2024-07-13 PROCEDURE — 82550 ASSAY OF CK (CPK): CPT | Performed by: HOSPITALIST

## 2024-07-13 PROCEDURE — 71045 X-RAY EXAM CHEST 1 VIEW: CPT

## 2024-07-13 PROCEDURE — 94664 DEMO&/EVAL PT USE INHALER: CPT

## 2024-07-13 PROCEDURE — 84132 ASSAY OF SERUM POTASSIUM: CPT

## 2024-07-13 PROCEDURE — 71046 X-RAY EXAM CHEST 2 VIEWS: CPT

## 2024-07-13 PROCEDURE — 86900 BLOOD TYPING SEROLOGIC ABO: CPT | Performed by: HOSPITALIST

## 2024-07-13 PROCEDURE — 82947 ASSAY GLUCOSE BLOOD QUANT: CPT

## 2024-07-13 PROCEDURE — 85025 COMPLETE CBC W/AUTO DIFF WBC: CPT | Performed by: HOSPITALIST

## 2024-07-13 PROCEDURE — 99223 1ST HOSP IP/OBS HIGH 75: CPT | Performed by: INTERNAL MEDICINE

## 2024-07-13 PROCEDURE — 82330 ASSAY OF CALCIUM: CPT

## 2024-07-13 PROCEDURE — 85014 HEMATOCRIT: CPT

## 2024-07-13 PROCEDURE — 86850 RBC ANTIBODY SCREEN: CPT | Performed by: HOSPITALIST

## 2024-07-13 PROCEDURE — 99285 EMERGENCY DEPT VISIT HI MDM: CPT

## 2024-07-13 PROCEDURE — 93308 TTE F-UP OR LMTD: CPT | Performed by: NURSE PRACTITIONER

## 2024-07-13 PROCEDURE — 71260 CT THORAX DX C+: CPT

## 2024-07-13 PROCEDURE — 81001 URINALYSIS AUTO W/SCOPE: CPT | Performed by: HOSPITALIST

## 2024-07-13 PROCEDURE — EDAIR PR ED AIR: Performed by: EMERGENCY MEDICINE

## 2024-07-13 PROCEDURE — 99285 EMERGENCY DEPT VISIT HI MDM: CPT | Performed by: SURGERY

## 2024-07-13 PROCEDURE — 83036 HEMOGLOBIN GLYCOSYLATED A1C: CPT | Performed by: INTERNAL MEDICINE

## 2024-07-13 PROCEDURE — 94760 N-INVAS EAR/PLS OXIMETRY 1: CPT

## 2024-07-13 PROCEDURE — 82803 BLOOD GASES ANY COMBINATION: CPT

## 2024-07-13 PROCEDURE — 93005 ELECTROCARDIOGRAM TRACING: CPT

## 2024-07-13 PROCEDURE — 90471 IMMUNIZATION ADMIN: CPT

## 2024-07-13 PROCEDURE — 36415 COLL VENOUS BLD VENIPUNCTURE: CPT | Performed by: NURSE PRACTITIONER

## 2024-07-13 PROCEDURE — 84484 ASSAY OF TROPONIN QUANT: CPT | Performed by: HOSPITALIST

## 2024-07-13 PROCEDURE — 86901 BLOOD TYPING SEROLOGIC RH(D): CPT | Performed by: HOSPITALIST

## 2024-07-13 PROCEDURE — 80048 BASIC METABOLIC PNL TOTAL CA: CPT | Performed by: HOSPITALIST

## 2024-07-13 PROCEDURE — 94640 AIRWAY INHALATION TREATMENT: CPT

## 2024-07-13 PROCEDURE — 84295 ASSAY OF SERUM SODIUM: CPT

## 2024-07-13 PROCEDURE — 76705 ECHO EXAM OF ABDOMEN: CPT | Performed by: NURSE PRACTITIONER

## 2024-07-13 PROCEDURE — 70450 CT HEAD/BRAIN W/O DYE: CPT

## 2024-07-13 PROCEDURE — 90715 TDAP VACCINE 7 YRS/> IM: CPT | Performed by: NURSE PRACTITIONER

## 2024-07-13 RX ORDER — METOPROLOL SUCCINATE 50 MG/1
100 TABLET, EXTENDED RELEASE ORAL EVERY 12 HOURS
Status: CANCELLED | OUTPATIENT
Start: 2024-07-13

## 2024-07-13 RX ORDER — ACETYLCYSTEINE 200 MG/ML
4 SOLUTION ORAL; RESPIRATORY (INHALATION)
Status: DISCONTINUED | OUTPATIENT
Start: 2024-07-13 | End: 2024-07-14

## 2024-07-13 RX ORDER — SODIUM CHLORIDE 9 MG/ML
75 INJECTION, SOLUTION INTRAVENOUS CONTINUOUS
Status: DISCONTINUED | OUTPATIENT
Start: 2024-07-13 | End: 2024-07-14

## 2024-07-13 RX ORDER — FLECAINIDE ACETATE 50 MG/1
150 TABLET ORAL EVERY 12 HOURS SCHEDULED
Status: DISCONTINUED | OUTPATIENT
Start: 2024-07-13 | End: 2024-07-17 | Stop reason: HOSPADM

## 2024-07-13 RX ORDER — ACETYLCYSTEINE 200 MG/ML
4 SOLUTION ORAL; RESPIRATORY (INHALATION) EVERY 4 HOURS
Status: DISCONTINUED | OUTPATIENT
Start: 2024-07-13 | End: 2024-07-13

## 2024-07-13 RX ORDER — EZETIMIBE 10 MG/1
10 TABLET ORAL
Status: DISCONTINUED | OUTPATIENT
Start: 2024-07-13 | End: 2024-07-17 | Stop reason: HOSPADM

## 2024-07-13 RX ORDER — FAMOTIDINE 20 MG/1
10 TABLET, FILM COATED ORAL DAILY
Status: DISCONTINUED | OUTPATIENT
Start: 2024-07-13 | End: 2024-07-17 | Stop reason: HOSPADM

## 2024-07-13 RX ORDER — ZOLPIDEM TARTRATE 5 MG/1
10 TABLET ORAL
Status: DISCONTINUED | OUTPATIENT
Start: 2024-07-13 | End: 2024-07-17 | Stop reason: HOSPADM

## 2024-07-13 RX ORDER — METOPROLOL SUCCINATE 100 MG/1
100 TABLET, EXTENDED RELEASE ORAL EVERY 12 HOURS
Status: DISCONTINUED | OUTPATIENT
Start: 2024-07-13 | End: 2024-07-17 | Stop reason: HOSPADM

## 2024-07-13 RX ORDER — BENZONATATE 100 MG/1
100 CAPSULE ORAL 3 TIMES DAILY PRN
Status: DISCONTINUED | OUTPATIENT
Start: 2024-07-13 | End: 2024-07-17 | Stop reason: HOSPADM

## 2024-07-13 RX ORDER — INSULIN LISPRO 100 [IU]/ML
1-5 INJECTION, SOLUTION INTRAVENOUS; SUBCUTANEOUS
Status: DISCONTINUED | OUTPATIENT
Start: 2024-07-13 | End: 2024-07-13

## 2024-07-13 RX ORDER — GABAPENTIN 300 MG/1
300 CAPSULE ORAL
Status: DISCONTINUED | OUTPATIENT
Start: 2024-07-13 | End: 2024-07-17 | Stop reason: HOSPADM

## 2024-07-13 RX ORDER — ALBUTEROL SULFATE 90 UG/1
2 AEROSOL, METERED RESPIRATORY (INHALATION) EVERY 6 HOURS PRN
Status: DISCONTINUED | OUTPATIENT
Start: 2024-07-13 | End: 2024-07-14

## 2024-07-13 RX ORDER — LEVALBUTEROL INHALATION SOLUTION 1.25 MG/3ML
1.25 SOLUTION RESPIRATORY (INHALATION)
Status: DISCONTINUED | OUTPATIENT
Start: 2024-07-13 | End: 2024-07-17 | Stop reason: HOSPADM

## 2024-07-13 RX ORDER — DILTIAZEM HYDROCHLORIDE 120 MG/1
120 CAPSULE, COATED, EXTENDED RELEASE ORAL 2 TIMES DAILY
Status: DISCONTINUED | OUTPATIENT
Start: 2024-07-13 | End: 2024-07-13

## 2024-07-13 RX ORDER — ASCORBIC ACID 500 MG
500 TABLET ORAL DAILY
Status: DISCONTINUED | OUTPATIENT
Start: 2024-07-14 | End: 2024-07-17 | Stop reason: HOSPADM

## 2024-07-13 RX ORDER — INSULIN LISPRO 100 [IU]/ML
1-6 INJECTION, SOLUTION INTRAVENOUS; SUBCUTANEOUS
Status: DISCONTINUED | OUTPATIENT
Start: 2024-07-14 | End: 2024-07-17 | Stop reason: HOSPADM

## 2024-07-13 RX ORDER — ROPINIROLE 1 MG/1
2 TABLET, FILM COATED ORAL
Status: DISCONTINUED | OUTPATIENT
Start: 2024-07-13 | End: 2024-07-17 | Stop reason: HOSPADM

## 2024-07-13 RX ORDER — FLECAINIDE ACETATE 50 MG/1
150 TABLET ORAL 2 TIMES DAILY
Status: DISCONTINUED | OUTPATIENT
Start: 2024-07-13 | End: 2024-07-13

## 2024-07-13 RX ADMIN — GABAPENTIN 300 MG: 300 CAPSULE ORAL at 21:04

## 2024-07-13 RX ADMIN — IOHEXOL 100 ML: 350 INJECTION, SOLUTION INTRAVENOUS at 06:34

## 2024-07-13 RX ADMIN — ROPINIROLE HYDROCHLORIDE 2 MG: 1 TABLET, FILM COATED ORAL at 21:04

## 2024-07-13 RX ADMIN — APIXABAN 5 MG: 5 TABLET, FILM COATED ORAL at 19:07

## 2024-07-13 RX ADMIN — FAMOTIDINE 10 MG: 20 TABLET ORAL at 19:07

## 2024-07-13 RX ADMIN — ZOLPIDEM TARTRATE 10 MG: 5 TABLET, COATED ORAL at 21:09

## 2024-07-13 RX ADMIN — BENZONATATE 100 MG: 100 CAPSULE ORAL at 21:09

## 2024-07-13 RX ADMIN — LEVALBUTEROL HYDROCHLORIDE 1.25 MG: 1.25 SOLUTION RESPIRATORY (INHALATION) at 20:14

## 2024-07-13 RX ADMIN — ACETYLCYSTEINE 800 MG: 200 SOLUTION ORAL; RESPIRATORY (INHALATION) at 20:14

## 2024-07-13 RX ADMIN — EZETIMIBE 10 MG: 10 TABLET ORAL at 21:03

## 2024-07-13 RX ADMIN — APIXABAN 5 MG: 5 TABLET, FILM COATED ORAL at 11:35

## 2024-07-13 RX ADMIN — TETANUS TOXOID, REDUCED DIPHTHERIA TOXOID AND ACELLULAR PERTUSSIS VACCINE, ADSORBED 0.5 ML: 5; 2.5; 8; 8; 2.5 SUSPENSION INTRAMUSCULAR at 08:10

## 2024-07-13 RX ADMIN — SODIUM CHLORIDE 75 ML/HR: 0.9 INJECTION, SOLUTION INTRAVENOUS at 19:50

## 2024-07-13 NOTE — QUICK NOTE
Med attending quick note:  Discussed with Dr. Dey.    Patient was initially admitted to trauma due to fall at home on Eliquis.  The trauma workup was unremarkable and she was admitted to internal medicine.  Per the patient she reports an unwitnessed fall, she cannot recall the events around her fall.  She feels every few months however what is unusual about this fall is that she does not recall the events surrounding it.  In the past she feels like she has off balance but does not recall this.  She denies chest pain or shortness of breath.  She has a history of recurrent UTIs and has had occasional dysuria the last 2 days.  She denies fevers or chills.  She denies headache.    Her pacemaker was interrogated in the emergency department due to readings of bradycardia on the telemetry monitor.      Vital signs reviewed and pertinent for slight hypertension with heart rate in the 60s  She is in no acute distress her neck is supple without rigidity  Respirations are even and unlabored  S1-S2 with some ectopy and bradycardic  Abdomen soft nontender nondistended positive bowel sounds    Laboratory review  pH 7.336  Hyponatremia to 128  And 1.34 and BUN 41  Leukos 150    CK 68  Troponin delta 4 hours 2    CBC unremarkable    Imaging reviewed:  CT head showed meningioma which was seen in 2023  CT chest abdomen pelvis  IMPRESSION:     No acute traumatic abnormality identified.     New large dependent right pleural effusion with associated atelectatic changes. Small left effusion.     Global cardiomegaly, right greater than left. Dilated hepatic veins and IVC suggesting right heart failure/regurgitation.     New ascites. New gallbladder wall thickening. New presacral edema. Correlate with clinical findings for third spacing of fluid.     Stable appearance of cystic lesion in the pancreatic head.     Stable appearance of solid enhancing left renal cortical mass.    Assessment  Syncope  Questionable pacer function with  bradycardia  Recurrent fall  New ascites  Large right pleural effusion  Renal mass  CHF  Fibrillation, sick sinus syndrome status post pacemaker    Plan:  I discussed personally with critical care and cardiology.  Cardiology felt the pacer need to be interrogated and [employer] was notified.  The patient will remain on telemetry.  We will hold Lopressor and diltiazem until it is understood if the patient would be effective as she is currently running in the 60s.  Flecainide will be continued once interrogation is made    Continue Eliquis    Due to the considerations of pacer malfunction it is not unreasonable to suspect this might be a reason for her syncope.  Will consult cardiology in the morning    CHF she appears hypovolemic.  Will provide gentle hydration.    Urinalysis was unremarkable however will monitor for UTI

## 2024-07-13 NOTE — ED NOTES
Patient assisted onto bedpan in attempt to obtain urine specimen. Spinal precautions maintained.      Radha Coelho RN  07/13/24 [id number]

## 2024-07-13 NOTE — ED NOTES
Patient daughter reporting concerns for patient receiving home medications and patient still unable to void. Provider notified and waiting on response at this time.      Radha Coelho RN  07/13/24 [id number]

## 2024-07-13 NOTE — TRAUMA DOCUMENTATION
Patient placed on and off bed pan. Patient has sensation of needing to urinate but unable to urinate at this time. Bladder scanner ready 378 ml in bladder.

## 2024-07-13 NOTE — H&P
VTE Pharmacologic Prophylaxis:   Moderate Risk (Score 3-4) - Pharmacological DVT Prophylaxis Ordered: apixaban (Eliquis).  Code Status: Prior DNR/DNI  Discussion with family: Updated  (daughter) via phone.Spoke in regards to patient admission; possible causes of her fall such as cardiac etiology and patient dehydration.  Informed her that we are currently holding 3 medications metoprolol, furosemide and Diltiazem.  Explanations in layman terms were provided and she understood the update and appreciated it.    Anticipated Length of Stay: Patient will be admitted on an inpatient basis with an anticipated length of stay of greater than 2 midnights secondary to Syncope workup.    Chief Complaint: fall    History of Present Illness:  Farnaz Martin is a 78 y.o. female with a PMH of HTN, atrial fibrillation (on a/c and s/p pacer), bronchiectasis, chronic fatigue, diabetes, Congestive heart failure, left kidney mass who presents with fall.  She notes she fell earlier today and couldn't get up for a few hours (she notes she fell at 11p and up at 6a).  She notes she was confused despite having access to a call button (she thought she was in [place] with her  and sons).   Her daughter received the call and came to her aid.  She's had falls in the past but she does not feel that she was confused.   She notes she falls about once every few months.  She notes she often feels her balance is off when she has falls.  She does not recall her fall at all today and notes she was just on the ground in her bedroom.    She is concerned that she has not had any of her meds this morning outside of eliquis.    She admits to some challenges producing urine. She has had off/on burning    Review of Systems:  Review of Systems   Constitutional:  Negative for activity change, appetite change, diaphoresis and fever.   HENT:  Negative for facial swelling and sore throat.    Eyes:  Positive for visual disturbance.        Feels  she's been blurry lately the last month while reading   Respiratory:  Positive for cough. Negative for chest tightness and shortness of breath.         Chronic cough   Cardiovascular:  Negative for chest pain and leg swelling.   Gastrointestinal:  Negative for abdominal pain, constipation, diarrhea, nausea and vomiting.   Endocrine: Negative for cold intolerance and heat intolerance.   Genitourinary:  Negative for dysuria.   Musculoskeletal:  Negative for gait problem.   Neurological:  Negative for dizziness, seizures, weakness, numbness and headaches.   Psychiatric/Behavioral:  Positive for confusion. Negative for agitation and behavioral problems.        Past Medical and Surgical History:   Past Medical History:   Diagnosis Date    Actinic keratosis     last assessed - 06Jun2014    Arthritis     Atrial fibrillation with rapid ventricular response (HCC)     last assessed - 26Apr2016    Atrial fibrillation with rapid ventricular response ([facility]) 04/19/2016    Basal cell carcinoma     Benign colon polyp     last assessed - 27Apr2015    Bronchiectasis without complication (HCC)     CHF (congestive heart failure) ([facility]) 06/2022    Chronic diastolic CHF (congestive heart failure) ([facility])     Disease of thyroid gland     Effusion of knee joint right     Resolved - 19Apr2016    Esophageal reflux     Fibromyalgia     last assessed - [id number]    Fibromyalgia, primary     GERD (gastroesophageal reflux disease)     Hyperlipidemia     Hypertension     Hyponatremia     Malignant neoplasm of thyroid gland (HCC)     Meningioma (HCC)     MGUS (monoclonal gammopathy of unknown significance)     Palpitations     last assessed - 30Apr2013    Peroneal tendonitis, right     last assessed - [id number]    Right lumbar radiculopathy 03/17/2016    Thyroid cancer (HCC)     Thyroid nodule     Trochanteric bursitis of left hip 03/09/2018       Past Surgical History:   Procedure Laterality Date    CARDIAC ELECTROPHYSIOLOGY STUDY AND ABLATION   08/2022    CATARACT EXTRACTION Bilateral     COLONOSCOPY  03/2018    COLONOSCOPY W/ POLYPECTOMY  2021    repeat in 5 years    EYE SURGERY      OOPHORECTOMY      VT NEUROPLASTY &/TRANSPOS MEDIAN NRV CARPAL TUNNE Right 11/14/2019    Procedure: RELEASE CARPAL TUNNEL;  Surgeon: Onesimo Tate MD;  Location: [facility];  Service: Orthopedics    VT TOTAL THYROID LOBECTOMY UNI W/WO ISTHMUSECTOMY Left 12/16/2020    Procedure: Left THYROID LOBECTOMY;  Surgeon: Jhony Bhatt MD;  Location: [facility];  Service: ENT    RECTAL POLYPECTOMY      REPLACEMENT TOTAL KNEE Left     last assessed - 27Apr2015    TONSILLECTOMY      TOTAL ABDOMINAL HYSTERECTOMY      TUBAL LIGATION      US GUIDED THYROID BIOPSY  10/14/2020       Meds/Allergies:  Prior to Admission medications    Medication Sig Start Date End Date Taking? Authorizing Provider   acetylcysteine (MUCOMYST) 10 % nebulizer solution Take 4 mL by nebulization every 4 (four) hours as needed (for bronchiectasis)    Historical Provider, MD   acetylcysteine (MUCOMYST) 200 mg/mL nebulizer solution Take 4 mL (800 mg total) by nebulization every 4 (four) hours 3/25/24   Anabella Dougherty MD   albuterol (ProAir HFA) 90 mcg/act inhaler Inhale 2 puffs every 6 (six) hours as needed for wheezing 1/30/24   Anabella Dougherty MD   apixaban (ELIQUIS) 5 mg Take 1 tablet (5 mg total) by mouth 2 (two) times a day 1/17/24   Nir Cunningham DO   ascorbic Acid (VITAMIN C) 500 MG CPCR Take 500 mg by mouth daily    Historical Provider, MD   benzonatate (TESSALON PERLES) 100 mg capsule Take 1 capsule (100 mg total) by mouth 3 (three) times a day as needed for cough 6/15/24   JENNY Enriquez   Cetirizine HCl (ZyrTEC Allergy) 10 MG CAPS Take 10 mg by mouth in the morning    Historical Provider, MD   Cholecalciferol 50 MCG (2000 UT) CAPS Take 2,000 Units by mouth 2 (two) times a day    Historical Provider, MD   Chromium Picolinate (CHROMIUM PICOLATE PO) Take 1 tablet by mouth daily     Historical Provider, MD   dapagliflozin 5 MG TABS Take 1 tablet (5 mg total) by mouth daily 5/31/24 11/27/24  Naveen Monsivais MD   diltiazem (CARDIZEM CD) 120 mg 24 hr capsule Take 120 mg by mouth 2 (two) times a day    Historical Provider, MD   ezetimibe (ZETIA) 10 mg tablet Take 1 tablet (10 mg total) by mouth daily at bedtime 5/27/24   Kyle Brunner, MD   famotidine (PEPCID) 20 mg tablet Take 20 mg by mouth 2 (two) times a day    Historical Provider, MD   flecainide (TAMBOCOR) 100 mg tablet Take 1 tablet (100 mg total) by mouth 2 (two) times a day 6/28/24   Nir Cunningham DO   furosemide (LASIX) 20 mg tablet Take 1 tablet (20 mg total) by mouth daily take 2 tablet by mouth daily if needed for shortness of breath WEIGHT GAIN 3 LBS IN 1 DAY OR LOWER LEG SWELLING 1/4/24   Avelino Guerra MD   gabapentin (NEURONTIN) 300 mg capsule take 1 capsule by mouth at bedtime 2/18/24   Naveen Monsivais MD   gabapentin (NEURONTIN) 300 mg capsule Take 300 mg by mouth daily at bedtime  Patient not taking: Reported on 6/17/2024    Historical Provider, MD   glucose blood (OneTouch Verio) test strip Check blood sugars once daily. Please substitute with appropriate alternative as covered by patient's insurance. Dx: E11.65 7/11/24   Naveen Monsivais MD   Insulin Glargine Solostar (Lantus SoloStar) 100 UNIT/ML SOPN Inject 0.1 mL (10 Units total) under the skin daily at bedtime 7/11/24 5/7/25  Naveen Monsivais MD   Insulin Pen Needle 31G X 4 MM MISC Use daily at bedtime 7/11/24   Naveen Monsivais MD   ipratropium (ATROVENT) 0.03 % nasal spray 2 sprays into each nostril 3 (three) times a day Begin once per day, then progress to twice per day. Progress to three times a day as tolerated.  Patient taking differently: 2 sprays into each nostril if needed Begin once per day, then progress to twice per day. Progress to three times a day as tolerated. 1/25/24   Anabella Dougherty MD   metoprolol succinate (TOPROL-XL) 100 mg 24 hr tablet Take 1  tablet (100 mg total) by mouth every 12 (twelve) hours 3/11/24   Nir Cunningham, DO   OneTouch Delica Lancets 33G MISC Check blood sugars once daily. Please substitute with appropriate alternative as covered by patient's insurance. Dx: E11.65 7/11/24   Naveen Monsivais MD   predniSONE 10 mg tablet  5/27/24   Historical Provider, MD   rOPINIRole (REQUIP) 1 mg tablet Take 2 tablets (2 mg total) by mouth daily at bedtime 5/6/24   Apple Lobato MD   sodium chloride 3 % inhalation solution Take 4 mL by nebulization as needed for cough 3/25/24   JENNY Enriquez   tiotropium (Spiriva Respimat) 2.5 MCG/ACT AERS inhaler Inhale 2 puffs daily 6/14/24   JENNY Enriquez   TURMERIC PO Take 1 capsule by mouth 2 (two) times a day    Historical Provider, MD   zolpidem (AMBIEN) 10 mg tablet Take 1 tablet (10 mg total) by mouth daily at bedtime as needed for sleep 1/23/24   Josie Wynn MD     Reviewed personally with the patient and placed a picture of her up to date medication list in media.     Allergies:   Allergies   Allergen Reactions    Sotalol Other (See Comments)     Prolonged QTc leading to torsades de pointes     Penicillins Other (See Comments)     As a child calcium deposit in the arm     Ace Inhibitors GI Intolerance     Did feel well on it    Omeprazole Abdominal Pain, Rash and Vomiting     stomach pain, vomiting, rash       Social History:  Marital Status:    Occupation: n/a   Patient Pre-hospital Living Situation: Home lives alone and her daughter lives nextdoor  Patient Pre-hospital Level of Mobility: walks with cane due to right knee has been increasing pain  Patient Pre-hospital Diet Restrictions: none  Substance Use History:   Social History     Substance and Sexual Activity   Alcohol Use Not Currently     Social History     Tobacco Use   Smoking Status Never   Smokeless Tobacco Never     Social History     Substance and Sexual Activity   Drug Use Never       Family  History:  Family History   Problem Relation Age of Onset    Heart disease Mother     Diabetes Mother     Heart disease Father     Coronary artery disease Father     Stroke Father         cerebrovascular accident    Heart attack Father         myocardial infarction    Sudden death Father         scd    Other Family         Back disorder    Coronary artery disease Family     Neuropathy Family     Osteoporosis Family     No Known Problems Daughter     No Known Problems Maternal Grandmother     No Known Problems Maternal Grandfather     No Known Problems Paternal Grandmother     No Known Problems Paternal Grandfather     Cancer Maternal Uncle     Breast cancer Maternal Aunt 65    No Known Problems Son     No Known Problems Maternal Aunt     No Known Problems Maternal Aunt     No Known Problems Maternal Aunt     No Known Problems Paternal Aunt     No Known Problems Paternal Aunt     Anuerysm Neg Hx     Clotting disorder Neg Hx     Arrhythmia Neg Hx     Heart failure Neg Hx        Physical Exam:     Vitals:   Blood Pressure: 128/68 (07/13/24 1630)  Pulse: 65 (07/13/24 1630)  Temperature: (!) 97.2 °F (36.2 °C) (07/13/24 0614)  Temp Source: Oral (07/13/24 0614)  Respirations: 18 (07/13/24 1630)  Weight - Scale: 76.2 kg (167 lb 15.9 oz) (07/13/24 0614)  SpO2: 97 % (07/13/24 1630)    Physical Exam  Constitutional:       General: She is not in acute distress.     Appearance: She is not ill-appearing or diaphoretic.   HENT:      Head: Normocephalic.   Cardiovascular:      Rate and Rhythm: Bradycardia present.      Pulses: Normal pulses.   Abdominal:      General: Abdomen is flat.      Tenderness: There is abdominal tenderness (Tenderness noted in Left Lower quadrant).   Skin:     Comments: Scar on left leg from prior knee replacement   Neurological:      Motor: Weakness (Weakness noted in bilateral upper and lower extremities) present.          Additional Data:     Lab Results:  Results from last 7 days   Lab Units  07/13/24  0628 07/13/24  0625   WBC Thousand/uL  --  7.98   HEMOGLOBIN g/dL  --  13.1   I STAT HEMOGLOBIN g/dl 13.3  --    HEMATOCRIT %  --  40.0   HEMATOCRIT, ISTAT % 39  --    PLATELETS Thousands/uL  --  281   SEGS PCT %  --  66   LYMPHO PCT %  --  20   MONO PCT %  --  11   EOS PCT %  --  2     Results from last 7 days   Lab Units 07/13/24  0628 07/13/24  0625   SODIUM mmol/L  --  128*   POTASSIUM mmol/L  --  4.6   CHLORIDE mmol/L  --  95*   CO2 mmol/L  --  23   CO2, I-STAT mmol/L 26  --    BUN mg/dL  --  41*   CREATININE mg/dL  --  1.34*   ANION GAP mmol/L  --  10   CALCIUM mg/dL  --  9.5   GLUCOSE RANDOM mg/dL  --  150*         Results from last 7 days   Lab Units 07/11/24  1405   POC GLUCOSE mg/dl 183*     Lab Results   Component Value Date    HGBA1C 8.8 (H) 06/28/2024    HGBA1C 8.9 (H) 05/07/2024    HGBA1C 7.6 (H) 01/09/2024           Lines/Drains:  Invasive Devices       Peripheral Intravenous Line  Duration             Peripheral IV 07/13/24 Right Antecubital <1 day                        Imaging: Personally reviewed the following imaging: chest CT scan, abdominal/pelvic CT, and CT head  TRAUMA - CT head wo contrast   Final Result by Sindi Campos MD (07/13 0745)      No acute intracranial abnormality. Hyperdensity on the left appearing related to meningioma, seen to better advantage on MRI from October 2023.         I personally discussed this study with Peter Bingham on 7/13/2024 7:17 AM.                  Workstation performed: [id number]         TRAUMA - CT spine cervical wo contrast   Final Result by Sindi Campos MD (07/13 0744)      Stable compared to prior. No acute traumatic abnormality identified in the C-spine.      Interval increased volume of right pleural fluid.         I personally discussed this study with Peter Bingham on 7/13/2024 7:44 AM.                  Workstation performed: [id number]         TRAUMA - CT chest abdomen pelvis w contrast   Final Result  by Sindi Campos MD (07/13 0744)      No acute traumatic abnormality identified.      New large dependent right pleural effusion with associated atelectatic changes. Small left effusion.      Global cardiomegaly, right greater than left. Dilated hepatic veins and IVC suggesting right heart failure/regurgitation.      New ascites. New gallbladder wall thickening. New presacral edema. Correlate with clinical findings for third spacing of fluid.      Stable appearance of cystic lesion in the pancreatic head.      Stable appearance of solid enhancing left renal cortical mass.         I personally discussed this study with Peter Bingham on 7/13/2024 7:40 AM.               Workstation performed: [id number]         XR Trauma multiple ([facility] trauma bay ONLY)   Final Result by Bertha Richter MD (07/13 0702)      No definite acute displaced fracture but see subsequent chest CT.      Mild interstitial edema.      Moderate right and small left pleural effusion, better shown on CT.               Workstation performed: [id number]         XR chest 1 view    (Results Pending)       EKG and Other Studies Reviewed on Admission:   EKG:  paced 60.    ** Please Note: This note has been constructed using a voice recognition system. **[facility]  H&P  Name: Farnaz Martin 78 y.o. female I MRN: [id number]  Unit/Bed#: JEANNE I Date of Admission: 7/13/2024   Date of Service: 7/13/2024 I Hospital Day: 0      Assessment & Plan   * Syncope and collapse  Assessment & Plan  Patient admitted for fall due to unknown cause.  Patient has multiple risk factors such as heart conditions; endorsed dehydration; history of urinary tract infections.    PLAN  Consider Urine culture      Type 2 diabetes mellitus with microalbuminuria, without long-term current use of insulin ([facility])  Assessment & Plan  Lab Results   Component Value Date    HGBA1C 8.8 (H) 06/28/2024       Recent Labs     07/11/24  1405    POCGLU 183*         PLAN  Hold Farxiga  Start insulin sliding scale  Monitor blood glucose  Consider Endocrine consultation  Consider resuming Gabapentin 300 mg        History of sick sinus syndrome s/p PPM  Assessment & Plan  Patient has pacemaker placement March 15, 2021    Paroxysmal atrial fibrillation (HCC)  Assessment & Plan  Patient with history of Atrial fibrillation  EKG absent P-waves; Wide QRS waves; Electronically paced    PLAN  Eliquis 5 mg BID  Hold Flecainide and Metoprolol    Chronic heart failure with preserved ejection fraction (HCC)  Assessment & Plan  Wt Readings from Last 3 Encounters:   07/13/24 76.2 kg (167 lb 15.9 oz)   07/11/24 73.7 kg (162 lb 8 oz)   06/17/24 69.9 kg (154 lb)     ECHO 5/25/2024  EF 55%; Moderate to severe Mitral regurgitation  CT: : Mild global cardiomegaly. Dilated hepatic IVC and hepatic veins suggesting possible right heart regurgitation     PLAN  Diltiazem  mg  Furosemide 20mg if 3lb weight gain  Metoprolol 100 mg every 12 hours  Consult cardiology    Insomnia  Assessment & Plan    PLAN  Continue home medication at reduced dose.  Start Ambien 5 mg    Hyperlipidemia  Assessment & Plan  Lipid Panel 1/9/2024  Cholesterol   Date Value Ref Range Status   01/09/2024 123 See Comment mg/dL Final     Comment:     Cholesterol:         Pediatric <18 Years        Desirable          <170 mg/dL      Borderline High    170-199 mg/dL      High               >=200 mg/dL        Adult >=18 Years            Desirable        <200 mg/dL      Borderline High  200-239 mg/dL      High             >239 mg/dL     05/06/2015 205 mg/dL Final     Comment:     CHOLESTEROL:       Desirable           <200 mg/dl       Borderline High     200-239 mg/dl       High                   >239 mg/dl  ____________________________________        Triglyceride 63; LDL 64; HDL 46; Cholesterol 123    PLAN  Continue Ezetemibe 10mg    Essential hypertension  Assessment & Plan  BP Readings from Last 1 Encounters:    07/13/24 128/68      Patient on Diltiazem 120 mg; Furosemide 20 mg, Metoprolol 100 mg q12    PLAN  -Continue her home medications

## 2024-07-13 NOTE — ASSESSMENT & PLAN NOTE
Patient admitted for fall due to unknown cause.  Patient has multiple risk factors such as heart conditions; endorsed dehydration; history of urinary tract infections.  Patient has sick sinus with pacemaker with possible dysfunction; consider cardiac etiology  Patient appears hypovolemic  CT Head unremarkable for acute cranial etiology.    PLAN  Hold Furosemide  Start IV NS 75cc/hr; dc fluids in morning  Consult with cardiology

## 2024-07-13 NOTE — ASSESSMENT & PLAN NOTE
Lipid Panel 1/9/2024  Cholesterol   Date Value Ref Range Status   01/09/2024 123 See Comment mg/dL Final     Comment:     Cholesterol:         Pediatric <18 Years        Desirable          <170 mg/dL      Borderline High    170-199 mg/dL      High               >=200 mg/dL        Adult >=18 Years            Desirable        <200 mg/dL      Borderline High  200-239 mg/dL      High             >239 mg/dL     05/06/2015 205 mg/dL Final     Comment:     CHOLESTEROL:       Desirable           <200 mg/dl       Borderline High     200-239 mg/dl       High                   >239 mg/dl  ____________________________________        Triglyceride 63; LDL 64; HDL 46; Cholesterol 123    PLAN  Continue Ezetemibe 10mg

## 2024-07-13 NOTE — ASSESSMENT & PLAN NOTE
Patient has pacemaker placement March 15, 2021  Pacemaker appears to be dysfunctioning    PLAN  -Cardiology Consultation for evaluation

## 2024-07-13 NOTE — QUICK NOTE
Progress Note - Triage Assessment   Farnaz Martin 78 y.o. female MRN: [id number]    Time Called: 1309  Date Called: 07/13/24  Room#: ED-43  Time Evaluated: 1335  Person requesting evaluation: Dr. Thurston    Called to ED to evaluate patient for possible admission to Critical Care Service, chart reviewed and patient evaluated.     Farnaz Martin 79 y/o female with a PMHx: HFpEF, a-fib on eliquis, SS s/p pacer, htn, HLD who presents to the ED after being found down for about 6 hours. She does not recall the events prior to the fall and only remembers waking up on the ground not knowing where she was. Per reports she was initially confused byt lowly became oriented. C/o right flank and right lower posterior rib tenderness. The patient endorsed not feeling well and having falls over the past month. CTH without acute intracranial abnormalities, CT c-spine no acute traumatic injuries, CT CAP new large right pleural effusion, new ascites, gallbladder wall thickening. While in the ED it was noticed that she had episodes of bradycardia despite her pacemaker being in place. She remains asymptomatic and normotensive to hypertensive. On exam pt is awake/alert and oriented, notably hard of hearing. HR 60 BPM with appropriate pacer spikes. She denies any pain, shortness of breath, increased WOB, N/V, lightheadedness/dizziness, palpitations.    Recommendations: Trauma team to reach out to cardiology for consult and recommendations ? Transfer [facility] for EP or admit with tele monitoring. Hold all rate controlling/suppressing medications. Okay for admit to [facility] with tele monitoring if not transferred.    Case discussed with Critical Care attending Dr. Choudhury and after review it was felt the patient is appropriate for admission to a general medical floor with telemetry monitoring. Also D/w Trauma attending Dr. Ullrich and agreed with admit to medicine with tele monitoring.     Recommendations discussed with Trauma resident  attending Dr. Thurston      Triage Assessment:     Patient appropriate to be admitted to Stepdown Level 2 or MS with telemetry level of care.    If any questions or concerns please call the critical care team via [facility].

## 2024-07-13 NOTE — TRAUMA DOCUMENTATION
Trauma resident at pt's bedside. Resident updated pt's family member on pt's status. Pt's heart rate drops to 20's for a few seconds and increases to 60's. Pt reports no CP or dizziness. Pt placed on pacer pads at this time.

## 2024-07-13 NOTE — ED PROVIDER NOTES
Emergency Department Airway Evaluation and Management Form    History  Obtained from: Patient  Sotalol, Penicillins, Ace inhibitors, and Omeprazole  Chief Complaint   Patient presents with    Fall     78-year-old female presents after she fell while attempting to use the bathroom, does not know what caused her to fall and does not remember the fall, does not believe she had a head strike, but again is unable to remember.  The patient reports she now is having pain on her right flank area, is currently on Eliquis, history and review of systems are limited by the acute nature of the patient's presentation.        Past Medical History:   Diagnosis Date    Actinic keratosis     last assessed - 06Jun2014    Arthritis     Atrial fibrillation with rapid ventricular response (HCC)     last assessed - 26Apr2016    Atrial fibrillation with rapid ventricular response (HCC) 04/19/2016    Basal cell carcinoma     Benign colon polyp     last assessed - 27Apr2015    Bronchiectasis without complication (HCC)     CHF (congestive heart failure) (HCC) 06/2022    Chronic diastolic CHF (congestive heart failure) (HCC)     Disease of thyroid gland     Effusion of knee joint right     Resolved - 19Apr2016    Esophageal reflux     Fibromyalgia     last assessed - [id number]    Fibromyalgia, primary     GERD (gastroesophageal reflux disease)     Hyperlipidemia     Hypertension     Hyponatremia     Malignant neoplasm of thyroid gland (HCC)     Meningioma (HCC)     MGUS (monoclonal gammopathy of unknown significance)     Palpitations     last assessed - 30Apr2013    Peroneal tendonitis, right     last assessed - [id number]    Right lumbar radiculopathy 03/17/2016    Thyroid cancer (HCC)     Thyroid nodule     Trochanteric bursitis of left hip 03/09/2018     Past Surgical History:   Procedure Laterality Date    CARDIAC ELECTROPHYSIOLOGY STUDY AND ABLATION  08/2022    CATARACT EXTRACTION Bilateral     COLONOSCOPY  03/2018    COLONOSCOPY W/  POLYPECTOMY  2021    repeat in 5 years    EYE SURGERY      OOPHORECTOMY      [place] NEUROPLASTY &/TRANSPOS MEDIAN NRV CARPAL TUNNE Right 11/14/2019    Procedure: RELEASE CARPAL TUNNEL;  Surgeon: Onesimo Tate MD;  [facility];  Service: Orthopedics    KY TOTAL THYROID LOBECTOMY UNI W/WO ISTHMUSECTOMY Left 12/16/2020    Procedure: Left THYROID LOBECTOMY;  Surgeon: Jhony Bhatt MD;  Location[facility];  Service: ENT    RECTAL POLYPECTOMY      REPLACEMENT TOTAL KNEE Left     last assessed - 27Apr2015    TONSILLECTOMY      TOTAL ABDOMINAL HYSTERECTOMY      TUBAL LIGATION      US GUIDED THYROID BIOPSY  10/14/2020     Family History   Problem Relation Age of Onset    Heart disease Mother     Diabetes Mother     Heart disease Father     Coronary artery disease Father     Stroke Father         cerebrovascular accident    Heart attack Father         myocardial infarction    Sudden death Father         scd    Other Family         Back disorder    Coronary artery disease Family     Neuropathy Family     Osteoporosis Family     No Known Problems Daughter     No Known Problems Maternal Grandmother     No Known Problems Maternal Grandfather     No Known Problems Paternal Grandmother     No Known Problems Paternal Grandfather     Cancer Maternal Uncle     Breast cancer Maternal Aunt 65    No Known Problems Son     No Known Problems Maternal Aunt     No Known Problems Maternal Aunt     No Known Problems Maternal Aunt     No Known Problems Paternal Aunt     No Known Problems Paternal Aunt     Anuerysm Neg Hx     Clotting disorder Neg Hx     Arrhythmia Neg Hx     Heart failure Neg Hx      Social History     Tobacco Use    Smoking status: Never    Smokeless tobacco: Never   Vaping Use    Vaping status: Never Used   Substance Use Topics    Alcohol use: Not Currently    Drug use: Never     I have reviewed and agree with the history as documented.    Review of Systems   Unable to perform ROS: Acuity of condition       Physical  Exam  /87   Pulse 59   Temp (!) 97.2 °F (36.2 °C) (Oral)   Resp 18   Wt 76.2 kg (167 lb 15.9 oz)   LMP 02/01/1990 (Within Weeks)   SpO2 95%   BMI 30.73 kg/m²     Physical Exam  Vitals and nursing note reviewed.   Constitutional:       General: She is not in acute distress.     Appearance: Normal appearance. She is well-developed. She is obese.   HENT:      Head: Normocephalic and atraumatic.   Eyes:      Extraocular Movements: Extraocular movements intact.      Conjunctiva/sclera: Conjunctivae normal.   Cardiovascular:      Rate and Rhythm: Normal rate and regular rhythm.   Pulmonary:      Effort: Pulmonary effort is normal. No respiratory distress.      Breath sounds: Normal breath sounds.   Abdominal:      General: There is no distension.      Palpations: Abdomen is soft.      Tenderness: There is no abdominal tenderness.   Musculoskeletal:         General: No swelling.      Cervical back: Neck supple.   Skin:     General: Skin is warm and dry.      Capillary Refill: Capillary refill takes less than 2 seconds.      Comments: Tenderness and bruising over the right flank   Neurological:      General: No focal deficit present.      Mental Status: She is alert and oriented to person, place, and time.   Psychiatric:         Mood and Affect: Mood normal.         ED Medications  Medications - No data to display    Intubation  Procedures    Notes  The patient has a GCS of 15, is oriented x3, responding to questions appropriately, protecting the airway adequately, does not need any airway intervention at this time.  The remainder of the patient's care will be deferred to the trauma team.      Final Diagnosis  Final diagnoses:   None       ED Provider  Electronically Signed by     iNr San MD  07/13/24 0633

## 2024-07-13 NOTE — ASSESSMENT & PLAN NOTE
BP Readings from Last 1 Encounters:   07/13/24 128/68      PLAN  Blood pressure currently stable; will reevaluate as appropriate

## 2024-07-13 NOTE — ASSESSMENT & PLAN NOTE
Lab Results   Component Value Date    HGBA1C 8.8 (H) 06/28/2024       Recent Labs     07/11/24  1405   POCGLU 183*         PLAN  Hold Farxiga  Start insulin sliding scale  Monitor blood glucose  Consider Endocrine consultation  Consider resuming Gabapentin 300 mg

## 2024-07-13 NOTE — ASSESSMENT & PLAN NOTE
Patient with history of Atrial fibrillation  EKG absent P-waves; Wide QRS waves; Electronically paced    PLAN  Eliquis 5 mg BID  Telemetry

## 2024-07-13 NOTE — TRAUMA DOCUMENTATION
Trauma resident messaged again regarding pt's home medications and patient's pacemaker rhythm. Another trauma resident came to bedside, who is not pt's provider, and states he will reach out to pt's provider.

## 2024-07-13 NOTE — ED NOTES
Messaged provider regarding voltaren gel for pt's hip and back pain.      Melissa Rodriguez RN  07/13/24 2801

## 2024-07-13 NOTE — ASSESSMENT & PLAN NOTE
Wt Readings from Last 3 Encounters:   07/13/24 76.2 kg (167 lb 15.9 oz)   07/11/24 73.7 kg (162 lb 8 oz)   06/17/24 69.9 kg (154 lb)     ECHO 5/25/2024  EF 55%; Moderate to severe Mitral regurgitation  CT: : Mild global cardiomegaly. Dilated hepatic IVC and hepatic veins suggesting possible right heart regurgitation   Patient appears Euvolemic    PLAN  Patient appears euvolemic Hold Furosemide 20mg   Consult cardiology  Hold Diltiazem, Metoprolol and Flecainide

## 2024-07-13 NOTE — PROCEDURES
POC FAST US    Date/Time: 7/13/2024 6:48 AM    Performed by: JENNY Garcia  Authorized by: JENNY Garcia    Patient location:  Trauma  Procedure details:     Exam Type:  Diagnostic    Indications: blunt abdominal trauma and blunt chest trauma      Assess for:  Intra-abdominal fluid and pericardial effusion    Technique: FAST      Views obtained:  Heart - Pericardial sac, LUQ - Splenorenal space, Suprapubic - Pouch of Malcolm and RUQ - Lara's Pouch    Image quality: diagnostic      Image availability:  Images available in PACS and video obtained  FAST Findings:     RUQ (Hepatorenal) free fluid: absent      LUQ (Splenorenal) free fluid: absent      Suprapubic free fluid: absent      Cardiac wall motion: identified      Pericardial effusion: absent    Interpretation:     Impressions: negative

## 2024-07-13 NOTE — H&P
"H&P - Trauma   Farnaz Martin 78 y.o. female MRN: [id number]  Unit/Bed#: ED-43 Encounter: [id number]    Trauma Alert: Level B   Model of Arrival: Ambulance    Trauma Team: Attending Otilia and TRACY Stephens  Consultants:     None     Assessment & Plan   Active Problems / Assessment:   Presumed fall-on the ground for approximately 6 hours.  Abnormal CT-patient was noted to have right-sided effusion, fluid around her liver, fluid around her spleen  Right posterior inferior rib contusion--tender and ecchymotic on exam  History of atrial fibrillation that she takes Eliquis for     Plan:   Pending formal CT reads, anticipate patient needing admission for minimum a syncope workup.     History of Present Illness     Chief Complaint: \"My back hurts\"   Mechanism:Fall     HPI:    Farnaz Martin is a 78 y.o. female with history of atrial fibrillation that she takes Eliquis for, presenting today for evaluation after being found down. Patient describes suffering a fall.  Patient is unsure how she ended up on the ground.  She does note being on the ground for approximately 6 hours.  She did have a life alert, but daughter allegedly reports that patient has been more confused and did not use it initially.  On my examination patient complains of some right flank/lower posterior rib tenderness.    Review of Systems   Constitutional: Negative.    HENT: Negative.     Eyes: Negative.    Respiratory: Negative.     Cardiovascular: Negative.    Gastrointestinal: Negative.    Endocrine: Negative.    Genitourinary: Negative.    Musculoskeletal:  Positive for back pain. Negative for arthralgias.   Skin: Negative.    Allergic/Immunologic: Negative.    Neurological: Negative.    Hematological: Negative.    Psychiatric/Behavioral: Negative.       12-point, complete review of systems was reviewed and negative except as stated above.     Historical Information     Past Medical History:   Diagnosis Date    Actinic keratosis     last assessed - " 06Jun2014    Arthritis     Atrial fibrillation with rapid ventricular response (HCC)     last assessed - 26Apr2016    Atrial fibrillation with rapid ventricular response (HCC) 04/19/2016    Basal cell carcinoma     Benign colon polyp     last assessed - 27Apr2015    Bronchiectasis without complication (HCC)     CHF (congestive heart failure) (HCC) 06/2022    Chronic diastolic CHF (congestive heart failure) (HCC)     Disease of thyroid gland     Effusion of knee joint right     Resolved - 19Apr2016    Esophageal reflux     Fibromyalgia     last assessed - [id number]    Fibromyalgia, primary     GERD (gastroesophageal reflux disease)     Hyperlipidemia     Hypertension     Hyponatremia     Malignant neoplasm of thyroid gland (HCC)     Meningioma (HCC)     MGUS (monoclonal gammopathy of unknown significance)     Palpitations     last assessed - 30Apr2013    Peroneal tendonitis, right     last assessed - [id number]    Right lumbar radiculopathy 03/17/2016    Thyroid cancer (HCC)     Thyroid nodule     Trochanteric bursitis of left hip 03/09/2018     Past Surgical History:   Procedure Laterality Date    CARDIAC ELECTROPHYSIOLOGY STUDY AND ABLATION  08/2022    CATARACT EXTRACTION Bilateral     COLONOSCOPY  03/2018    COLONOSCOPY W/ POLYPECTOMY  2021    repeat in 5 years    EYE SURGERY      OOPHORECTOMY      AR NEUROPLASTY &/TRANSPOS MEDIAN NRV CARPAL TUNNE Right 11/14/2019    Procedure: RELEASE CARPAL TUNNEL;  Surgeon: Onesimo Tate MD;  [facility];  Service: Orthopedics    AR TOTAL THYROID LOBECTOMY UNI W/WO ISTHMUSECTOMY Left 12/16/2020    Procedure: Left THYROID LOBECTOMY;  Surgeon: Jhoyn Bhatt MD;  [facility];  Service: ENT    RECTAL POLYPECTOMY      REPLACEMENT TOTAL KNEE Left     last assessed - 27Apr2015    TONSILLECTOMY      TOTAL ABDOMINAL HYSTERECTOMY      TUBAL LIGATION      US GUIDED THYROID BIOPSY  10/14/2020        Social History     Tobacco Use    Smoking status: Never    Smokeless  tobacco: Never   Vaping Use    Vaping status: Never Used   Substance Use Topics    Alcohol use: Not Currently    Drug use: Never     Immunization History   Administered Date(s) Administered    COVID-19 MODERNA VACC 0.5 ML IM 01/27/2021, 01/27/2021, 02/24/2021, 02/24/2021, 11/23/2021    COVID-19 Moderna Vac BIVALENT 12 Yr+ IM 0.5 ML 12/09/2022    COVID-19 Moderna mRNA Vaccine 12 Yr+ 50 mcg/0.5 mL (Spikevax) 01/02/2024    INFLUENZA 12/23/2015, 12/23/2015, 11/07/2016, 11/07/2016, 10/18/2017, 10/18/2017, 12/04/2018, 12/04/2018, 09/26/2019, 09/08/2020, 11/03/2021, 09/19/2022, 09/19/2022, 12/19/2023    Influenza Split High Dose Preservative Free IM 10/01/2014, 12/23/2015, 11/07/2016, 10/18/2017    Influenza, high dose seasonal 0.7 mL 12/04/2018, 09/26/2019, 09/08/2020, 11/03/2021, 09/19/2022, 12/19/2023    Influenza, seasonal, injectable 10/16/2012    Pneumococcal Conjugate 13-Valent 04/27/2015    Pneumococcal Polysaccharide PPV23 03/29/2022, 03/29/2022    Respiratory Syncytial Virus Vaccine (Recombinant) 02/21/2024     Last Tetanus: Unknown.  Updated.  Family History: Non-contributory    1. Before the illness or injury that brought you to the Emergency, did you need someone to help you on a regular basis? 0=No   2. Since the illness or injury that brought you to the Emergency, have you needed more help than usual to take care of yourself? 1=Yes   3. Have you been hospitalized for one or more nights during the past 6 months (excluding a stay in the Emergency Department)? 0=No   4. In general, do you see well? 0=Yes   5. In general, do you have serious problems with your memory? 1=Yes   6. Do you take more than three different medications everyday? 1=Yes   TOTAL   3     Did you order a geriatric consult if the score was 2 or greater?:  Pending disposition     Meds/Allergies   all current active meds have been reviewed  Allergies have not been reviewed;    Allergies   Allergen Reactions    Sotalol Other (See Comments)      Prolonged QTc leading to torsades de pointes     Penicillins Other (See Comments)     As a child calcium deposit in the arm     Ace Inhibitors GI Intolerance     Did feel well on it    Omeprazole Abdominal Pain, Rash and Vomiting     stomach pain, vomiting, rash       Objective   Initial Vitals:   Temperature: (!) 97.2 °F (36.2 °C) (07/13/24 0614)  Pulse: 57 (07/13/24 0614)  Respirations: 20 (07/13/24 0614)  Blood Pressure: 134/98 (07/13/24 0614)    Primary Survey:   Airway:        Status: patent;        Pre-hospital Interventions: none        Hospital Interventions: none  Breathing:        Pre-hospital Interventions: none       Effort: normal       Right breath sounds: normal       Left breath sounds: normal  Circulation:        Rhythm: regular       Rate: regular   Right Pulses Left Pulses    R radial: 2+  R femoral: 2+  R pedal: 2+     L radial: 2+  L femoral: 2+  L pedal: 2+       Disability:        GCS: Eye: 4; Verbal: 5 Motor: 6 Total: 15       Right Pupil: 4 mm;  round;  reactive         Left Pupil:  4 mm;  round;  reactive      R Motor Strength L Motor Strength    R : 5/5  R dorsiflex: 5/5  R plantarflex: 5/5 L : 5/5  L dorsiflex: 5/5  L plantarflex: 5/5        Sensory:  No sensory deficit  Exposure:       Completed: Yes      Secondary Survey:  Physical Exam  Constitutional:       General: She is not in acute distress.     Appearance: She is not ill-appearing.   HENT:      Head: Normocephalic and atraumatic.      Right Ear: External ear normal.      Left Ear: External ear normal.      Nose: Nose normal.      Mouth/Throat:      Mouth: Mucous membranes are moist.      Pharynx: Oropharynx is clear.   Eyes:      Extraocular Movements: Extraocular movements intact.      Pupils: Pupils are equal, round, and reactive to light.   Neck:      Comments: Cervical collar in place.  No C-spine tenderness.  Cardiovascular:      Rate and Rhythm: Normal rate and regular rhythm.      Pulses: Normal pulses.      Heart  sounds: Normal heart sounds.   Pulmonary:      Effort: Pulmonary effort is normal. No respiratory distress.      Breath sounds: No stridor. No wheezing.          Comments: Decreased breath sounds on right side.  What appears to be old ecchymosis on posterior right lower ribs and extending towards her lateral flank..  Chest:      Chest wall: Tenderness present.   Abdominal:      General: Abdomen is flat. Bowel sounds are normal. There is no distension.      Palpations: Abdomen is soft.      Tenderness: There is no abdominal tenderness. There is no guarding.   Genitourinary:     Comments: Pelvis is stable  Musculoskeletal:      Cervical back: No tenderness.      Comments: Patient fully ranging all extremities.  No extremity tenderness.  No C, T, L-spine tenderness.  No palpable step-offs.   Skin:     General: Skin is warm and dry.      Capillary Refill: Capillary refill takes less than 2 seconds.             Comments: Ecchymosis, see chest diagram.  2 scabbed wounds as indicated above   Neurological:      Mental Status: She is alert and oriented to person, place, and time.      Sensory: No sensory deficit.      Motor: No weakness.   Psychiatric:      Comments: Cooperative.  Speech is clear.         Invasive Devices       Peripheral Intravenous Line  Duration             Peripheral IV 07/13/24 Right Antecubital <1 day                  Lab Results: I have personally reviewed all pertinent laboratory/test results 07/13/24 and in the preceding 24 hours.  Recent Labs     07/13/24  0625 07/13/24  0628   WBC 7.98  --    HGB 13.1 13.3   HCT 40.0 39     --    CO2  --  26   CAIONIZED  --  1.20       Imaging Results: I have personally reviewed pertinent images saved in PACS. CT scan findings (and other pertinent positive findings on images) were discussed with radiology. My interpretation of the images/reports are as follows:  Chest Xray(s): pending   FAST exam(s): negative for acute findings   CT Scan(s): pending    Additional Xray(s): N/A     Other Studies: none    Code Status: Prior  Advance Directive and Living Will:      Power of :    POLST:

## 2024-07-14 ENCOUNTER — APPOINTMENT (OUTPATIENT)
Dept: NON INVASIVE DIAGNOSTICS | Facility: HOSPITAL | Age: 79
DRG: 186 | End: 2024-07-14
Payer: MEDICARE

## 2024-07-14 PROBLEM — J90 PLEURAL EFFUSION, RIGHT: Status: ACTIVE | Noted: 2024-07-14

## 2024-07-14 LAB
ANION GAP SERPL CALCULATED.3IONS-SCNC: 6 MMOL/L (ref 4–13)
BUN SERPL-MCNC: 31 MG/DL (ref 5–25)
CALCIUM SERPL-MCNC: 8.8 MG/DL (ref 8.4–10.2)
CHLORIDE SERPL-SCNC: 101 MMOL/L (ref 96–108)
CO2 SERPL-SCNC: 23 MMOL/L (ref 21–32)
CREAT SERPL-MCNC: 1.08 MG/DL (ref 0.6–1.3)
CRP SERPL QL: 18.2 MG/L
GFR SERPL CREATININE-BSD FRML MDRD: 48 ML/MIN/1.73SQ M
GLUCOSE P FAST SERPL-MCNC: 123 MG/DL (ref 65–99)
GLUCOSE SERPL-MCNC: 118 MG/DL (ref 65–140)
GLUCOSE SERPL-MCNC: 123 MG/DL (ref 65–140)
GLUCOSE SERPL-MCNC: 144 MG/DL (ref 65–140)
GLUCOSE SERPL-MCNC: 199 MG/DL (ref 65–140)
GLUCOSE SERPL-MCNC: 229 MG/DL (ref 65–140)
MAGNESIUM SERPL-MCNC: 2.1 MG/DL (ref 1.9–2.7)
POTASSIUM SERPL-SCNC: 4.2 MMOL/L (ref 3.5–5.3)
SODIUM SERPL-SCNC: 130 MMOL/L (ref 135–147)

## 2024-07-14 PROCEDURE — 99232 SBSQ HOSP IP/OBS MODERATE 35: CPT | Performed by: NURSE PRACTITIONER

## 2024-07-14 PROCEDURE — 93308 TTE F-UP OR LMTD: CPT

## 2024-07-14 PROCEDURE — 93308 TTE F-UP OR LMTD: CPT | Performed by: INTERNAL MEDICINE

## 2024-07-14 PROCEDURE — 94640 AIRWAY INHALATION TREATMENT: CPT

## 2024-07-14 PROCEDURE — 82948 REAGENT STRIP/BLOOD GLUCOSE: CPT

## 2024-07-14 PROCEDURE — 83735 ASSAY OF MAGNESIUM: CPT

## 2024-07-14 PROCEDURE — 80048 BASIC METABOLIC PNL TOTAL CA: CPT

## 2024-07-14 PROCEDURE — 99223 1ST HOSP IP/OBS HIGH 75: CPT | Performed by: INTERNAL MEDICINE

## 2024-07-14 PROCEDURE — 93325 DOPPLER ECHO COLOR FLOW MAPG: CPT | Performed by: INTERNAL MEDICINE

## 2024-07-14 PROCEDURE — 86140 C-REACTIVE PROTEIN: CPT | Performed by: INTERNAL MEDICINE

## 2024-07-14 PROCEDURE — 93325 DOPPLER ECHO COLOR FLOW MAPG: CPT

## 2024-07-14 PROCEDURE — 99232 SBSQ HOSP IP/OBS MODERATE 35: CPT | Performed by: INTERNAL MEDICINE

## 2024-07-14 PROCEDURE — 94760 N-INVAS EAR/PLS OXIMETRY 1: CPT

## 2024-07-14 PROCEDURE — 93321 DOPPLER ECHO F-UP/LMTD STD: CPT

## 2024-07-14 PROCEDURE — 93321 DOPPLER ECHO F-UP/LMTD STD: CPT | Performed by: INTERNAL MEDICINE

## 2024-07-14 RX ORDER — ALBUTEROL SULFATE 90 UG/1
2 AEROSOL, METERED RESPIRATORY (INHALATION) EVERY 6 HOURS PRN
Status: DISCONTINUED | OUTPATIENT
Start: 2024-07-14 | End: 2024-07-17 | Stop reason: HOSPADM

## 2024-07-14 RX ORDER — DILTIAZEM HYDROCHLORIDE 120 MG/1
120 CAPSULE, COATED, EXTENDED RELEASE ORAL DAILY
Status: DISCONTINUED | OUTPATIENT
Start: 2024-07-14 | End: 2024-07-17 | Stop reason: HOSPADM

## 2024-07-14 RX ORDER — GUAIFENESIN 100 MG/5ML
200 SOLUTION ORAL EVERY 4 HOURS PRN
Status: CANCELLED | OUTPATIENT
Start: 2024-07-14

## 2024-07-14 RX ORDER — ACETYLCYSTEINE 200 MG/ML
4 SOLUTION ORAL; RESPIRATORY (INHALATION)
Status: DISCONTINUED | OUTPATIENT
Start: 2024-07-14 | End: 2024-07-14

## 2024-07-14 RX ORDER — GUAIFENESIN 600 MG/1
600 TABLET, EXTENDED RELEASE ORAL EVERY 12 HOURS SCHEDULED
Status: DISCONTINUED | OUTPATIENT
Start: 2024-07-14 | End: 2024-07-17 | Stop reason: HOSPADM

## 2024-07-14 RX ORDER — ACETAMINOPHEN 325 MG/1
650 TABLET ORAL EVERY 8 HOURS PRN
Status: DISCONTINUED | OUTPATIENT
Start: 2024-07-14 | End: 2024-07-17 | Stop reason: HOSPADM

## 2024-07-14 RX ADMIN — LEVALBUTEROL HYDROCHLORIDE 1.25 MG: 1.25 SOLUTION RESPIRATORY (INHALATION) at 19:48

## 2024-07-14 RX ADMIN — LEVALBUTEROL HYDROCHLORIDE 1.25 MG: 1.25 SOLUTION RESPIRATORY (INHALATION) at 13:36

## 2024-07-14 RX ADMIN — INSULIN LISPRO 2 UNITS: 100 INJECTION, SOLUTION INTRAVENOUS; SUBCUTANEOUS at 11:47

## 2024-07-14 RX ADMIN — ACETAMINOPHEN 650 MG: 325 TABLET, FILM COATED ORAL at 12:19

## 2024-07-14 RX ADMIN — LEVALBUTEROL HYDROCHLORIDE 1.25 MG: 1.25 SOLUTION RESPIRATORY (INHALATION) at 07:10

## 2024-07-14 RX ADMIN — APIXABAN 5 MG: 5 TABLET, FILM COATED ORAL at 17:51

## 2024-07-14 RX ADMIN — APIXABAN 5 MG: 5 TABLET, FILM COATED ORAL at 08:47

## 2024-07-14 RX ADMIN — DILTIAZEM HYDROCHLORIDE 120 MG: 120 CAPSULE, COATED, EXTENDED RELEASE ORAL at 13:33

## 2024-07-14 RX ADMIN — FLECAINIDE ACETATE 150 MG: 50 TABLET ORAL at 08:48

## 2024-07-14 RX ADMIN — FLECAINIDE ACETATE 150 MG: 50 TABLET ORAL at 20:42

## 2024-07-14 RX ADMIN — BENZONATATE 100 MG: 100 CAPSULE ORAL at 12:19

## 2024-07-14 RX ADMIN — GUAIFENESIN 600 MG: 600 TABLET ORAL at 08:49

## 2024-07-14 RX ADMIN — ROPINIROLE HYDROCHLORIDE 2 MG: 1 TABLET, FILM COATED ORAL at 21:04

## 2024-07-14 RX ADMIN — ACETYLCYSTEINE 800 MG: 200 SOLUTION ORAL; RESPIRATORY (INHALATION) at 07:10

## 2024-07-14 RX ADMIN — OXYCODONE HYDROCHLORIDE AND ACETAMINOPHEN 500 MG: 500 TABLET ORAL at 08:47

## 2024-07-14 RX ADMIN — METOPROLOL SUCCINATE 100 MG: 100 TABLET, EXTENDED RELEASE ORAL at 20:42

## 2024-07-14 RX ADMIN — ZOLPIDEM TARTRATE 10 MG: 5 TABLET, COATED ORAL at 23:28

## 2024-07-14 RX ADMIN — METOPROLOL SUCCINATE 100 MG: 100 TABLET, EXTENDED RELEASE ORAL at 08:47

## 2024-07-14 RX ADMIN — GUAIFENESIN 600 MG: 600 TABLET ORAL at 20:42

## 2024-07-14 RX ADMIN — FAMOTIDINE 10 MG: 20 TABLET ORAL at 08:47

## 2024-07-14 RX ADMIN — UMECLIDINIUM 1 PUFF: 62.5 AEROSOL, POWDER ORAL at 08:51

## 2024-07-14 RX ADMIN — GABAPENTIN 300 MG: 300 CAPSULE ORAL at 21:04

## 2024-07-14 RX ADMIN — EZETIMIBE 10 MG: 10 TABLET ORAL at 21:04

## 2024-07-14 NOTE — ASSESSMENT & PLAN NOTE
Lab Results   Component Value Date    HGBA1C 8.8 (H) 07/13/2024       Recent Labs     07/11/24  1405 07/13/24  2102 07/14/24  0726 07/14/24  1034   POCGLU 183* 212* 118 229*         PLAN  Hold Farxiga  Insulin sliding scale  Diabetic diet  Monitor blood glucose

## 2024-07-14 NOTE — PLAN OF CARE
Problem: PAIN - ADULT  Goal: Verbalizes/displays adequate comfort level or baseline comfort level  Description: Interventions:  - Encourage patient to monitor pain and request assistance  - Assess pain using appropriate pain scale  - Administer analgesics based on type and severity of pain and evaluate response  - Implement non-pharmacological measures as appropriate and evaluate response  - Consider cultural and social influences on pain and pain management  - Notify physician/advanced practitioner if interventions unsuccessful or patient reports new pain  Outcome: Progressing     Problem: INFECTION - ADULT  Goal: Absence or prevention of progression during hospitalization  Description: INTERVENTIONS:  - Assess and monitor for signs and symptoms of infection  - Monitor lab/diagnostic results  - Monitor all insertion sites, i.e. indwelling lines, tubes, and drains  - Monitor endotracheal if appropriate and nasal secretions for changes in amount and color  - [place] appropriate cooling/warming therapies per order  - Administer medications as ordered  - Instruct and encourage patient and family to use good hand hygiene technique  - Identify and instruct in appropriate isolation precautions for identified infection/condition  Outcome: Progressing  Goal: Absence of fever/infection during neutropenic period  Description: INTERVENTIONS:  - Monitor WBC    Outcome: Progressing     Problem: SAFETY ADULT  Goal: Patient will remain free of falls  Description: INTERVENTIONS:  - Educate patient/family on patient safety including physical limitations  - Instruct patient to call for assistance with activity   - Consult OT/PT to assist with strengthening/mobility   - Keep Call bell within reach  - Keep bed low and locked with side rails adjusted as appropriate  - Keep care items and personal belongings within reach  - Initiate and maintain comfort rounds  - Make Fall Risk Sign visible to staff  - Apply yellow socks and bracelet  for high fall risk patients  - Consider moving patient to room near nurses station  Outcome: Progressing  Goal: Maintain or return to baseline ADL function  Description: INTERVENTIONS:  -  Assess patient's ability to carry out ADLs; assess patient's baseline for ADL function and identify physical deficits which impact ability to perform ADLs (bathing, care of mouth/teeth, toileting, grooming, dressing, etc.)  - Assess/evaluate cause of self-care deficits   - Assess range of motion  - Assess patient's mobility; develop plan if impaired  - Assess patient's need for assistive devices and provide as appropriate  - Encourage maximum independence but intervene and supervise when necessary  - Involve family in performance of ADLs  - Assess for home care needs following discharge   - Consider OT consult to assist with ADL evaluation and planning for discharge  - Provide patient education as appropriate  Outcome: Progressing  Goal: Maintains/Returns to pre admission functional level  Description: INTERVENTIONS:  - Perform AM-PAC 6 Click Basic Mobility/ Daily Activity assessment daily.  - Set and communicate daily mobility goal to care team and patient/family/caregiver.   - Collaborate with rehabilitation services on mobility goals if consulted  - Out of bed for toileting  - Record patient progress and toleration of activity level   Outcome: Progressing     Problem: DISCHARGE PLANNING  Goal: Discharge to home or other facility with appropriate resources  Description: INTERVENTIONS:  - Identify barriers to discharge w/patient and caregiver  - Arrange for needed discharge resources and transportation as appropriate  - Identify discharge learning needs (meds, wound care, etc.)  - Arrange for interpretive services to assist at discharge as needed  - Refer to Case Management Department for coordinating discharge planning if the patient needs post-hospital services based on physician/advanced practitioner order or complex needs  related to functional status, cognitive ability, or social support system  Outcome: Progressing     Problem: Knowledge Deficit  Goal: Patient/family/caregiver demonstrates understanding of disease process, treatment plan, medications, and discharge instructions  Description: Complete learning assessment and assess knowledge base.  Interventions:  - Provide teaching at level of understanding  - Provide teaching via preferred learning methods  Outcome: Progressing

## 2024-07-14 NOTE — CONSULTS
Pulmonary Consultation   Farnaz Martin 78 y.o. female MRN: [id number]  Unit/Bed#: S -01 Encounter: [id number]    Reason for consultation: Right pleural effusion; chronic cough.    Requesting physician: Dr. Mata.    Impressions:  Right pleural effusion, suspect transudative etiology.  Chronic cough, likely secondary to atelectasis, though could potentially be related to known bronchiectasis.  Suspected renal cell carcinoma on imaging, pending further workup.    Recommendations:  Will perform bedside thoracentesis tomorrow, though will re-discuss risks/benefits tomorrow if her ECHO shows acute worsening, which would make a transudative/CHF etiology much more likely.  Patient has benefited previously from nebulized bronchodilators, will continue for now.  Stop mucomyst as patient reported nausea with this as an outpatient previously.  Would not yet order antibiotics or steroids as part of treatment of bronchiectasis but will consider this going forward.    History of Present Illness   HPI:  Farnaz Martin is a 78 y.o. female who is admitted for weakness and fatigue.  She reports this has been going on for some time.  She believes what happened prior to admission is that she was walking but felt weak and laid herself to the ground.  She was unable to get anyone to assist her at the time - she was hallucinating, thought that she was in [place] and that her LifeAlert would not work.  However, eventually she pressed it, and EMS picked her up and brought her in.  Imaging showed a new large right pleural effusion.  She does not think she's ever had this before and she has never undergone thoracentesis.    An echo was ordered to evaluate these complaints and is pending read.    She further notes a chronic cough that has waxed and waned for months now but has been much worse over the past 24 hours.  She was previously seen for this in our clinic.  She has responded positively to albuterol and nebulizer  treatments previously - she specifically notes a flare that occurred in February which responded well to this therapy.  The cough is non productive.    Discussed the utility of thoracentesis in symptom improvement and diagnosis.  Advised her that a parapneumonic effusion was possible but that if she was found to have ECHO changes, that this would be much less likely, though still possibly beneficial for her cough and fatigue/weakness.  The patient initially reported being diagnosed with a lung lesion that was pending workup; however, review of chart suggest this is actually a presumed renal cell carcinoma that does not appear to have been fully investigated yet.  She is overdue for a follow up renal protocol CT ordered by Dr. Grullon of IR.    Review of systems:  Review of Systems   Constitutional:  Positive for fatigue.   Respiratory:  Positive for cough and shortness of breath.    Neurological:  Positive for weakness.     All other 12-point review of systems are negative.    Historical Information   Past Medical History:   Diagnosis Date    Actinic keratosis     last assessed - 06Jun2014    Arthritis     Atrial fibrillation with rapid ventricular response (HCC)     last assessed - 26Apr2016    Atrial fibrillation with rapid ventricular response (HCC) 04/19/2016    Basal cell carcinoma     Benign colon polyp     last assessed - 27Apr2015    Bronchiectasis without complication (HCC)     CHF (congestive heart failure) (HCC) 06/2022    Chronic diastolic CHF (congestive heart failure) (HCC)     Disease of thyroid gland     Effusion of knee joint right     Resolved - 19Apr2016    Esophageal reflux     Fibromyalgia     last assessed - 27Dec2017    Fibromyalgia, primary     GERD (gastroesophageal reflux disease)     Hyperlipidemia     Hypertension     Hyponatremia     Malignant neoplasm of thyroid gland (HCC)     Meningioma (HCC)     MGUS (monoclonal gammopathy of unknown significance)     Palpitations     last assessed -  [id number]    Peroneal tendonitis, right     last assessed - [id number]    Right lumbar radiculopathy 03/17/2016    Thyroid cancer (HCC)     Thyroid nodule     Trochanteric bursitis of left hip 03/09/2018     Past Surgical History:   Procedure Laterality Date    CARDIAC ELECTROPHYSIOLOGY STUDY AND ABLATION  08/2022    CATARACT EXTRACTION Bilateral     COLONOSCOPY  03/2018    COLONOSCOPY W/ POLYPECTOMY  2021    repeat in 5 years    EYE SURGERY      OOPHORECTOMY      AK NEUROPLASTY &/TRANSPOS MEDIAN NRV CARPAL TUNNE Right 11/14/2019    Procedure: RELEASE CARPAL TUNNEL;  Surgeon: Onesimo Tate MD;  Location: [facility];  Service: Orthopedics    AK TOTAL THYROID LOBECTOMY UNI W/WO ISTHMUSECTOMY Left 12/16/2020    Procedure: Left THYROID LOBECTOMY;  Surgeon: Jhony Bhatt MD;  Location: [facility];  Service: ENT    RECTAL POLYPECTOMY      REPLACEMENT TOTAL KNEE Left     last assessed - [id number]    TONSILLECTOMY      TOTAL ABDOMINAL HYSTERECTOMY      TUBAL LIGATION      US GUIDED THYROID BIOPSY  10/14/2020     Family History   Problem Relation Age of Onset    Heart disease Mother     Diabetes Mother     Heart disease Father     Coronary artery disease Father     Stroke Father         cerebrovascular accident    Heart attack Father         myocardial infarction    Sudden death Father         scd    Other Family         Back disorder    Coronary artery disease Family     Neuropathy Family     Osteoporosis Family     No Known Problems Daughter     No Known Problems Maternal Grandmother     No Known Problems Maternal Grandfather     No Known Problems Paternal Grandmother     No Known Problems Paternal Grandfather     Cancer Maternal Uncle     Breast cancer Maternal Aunt 65    No Known Problems Son     No Known Problems Maternal Aunt     No Known Problems Maternal Aunt     No Known Problems Maternal Aunt     No Known Problems Paternal Aunt     No Known Problems Paternal Aunt     Anuerysm Neg Hx     Clotting disorder Neg Hx      Arrhythmia Neg Hx     Heart failure Neg Hx      Tobacco history: Never smoked but did have significant secondhand smoke from her , though he quit in the 1980's.    Family history: NC.    Meds/Allergies   Current Facility-Administered Medications   Medication Dose Route Frequency    acetaminophen (TYLENOL) tablet 650 mg  650 mg Oral Q8H PRN    acetylcysteine (MUCOMYST) 200 mg/mL inhalation solution 800 mg  4 mL Nebulization Daily    albuterol (PROVENTIL HFA,VENTOLIN HFA) inhaler 2 puff  2 puff Inhalation Q6H PRN    apixaban (ELIQUIS) tablet 5 mg  5 mg Oral BID    ascorbic acid (VITAMIN C) tablet 500 mg  500 mg Oral Daily    benzonatate (TESSALON PERLES) capsule 100 mg  100 mg Oral TID PRN    diltiazem (CARDIZEM CD) 24 hr capsule 120 mg  120 mg Oral Daily    ezetimibe (ZETIA) tablet 10 mg  10 mg Oral HS    famotidine (PEPCID) tablet 10 mg  10 mg Oral Daily    flecainide (TAMBOCOR) tablet 150 mg  150 mg Oral Q12H JOAQUINA    gabapentin (NEURONTIN) capsule 300 mg  300 mg Oral HS    guaiFENesin (MUCINEX) 12 hr tablet 600 mg  600 mg Oral Q12H JOAQUINA    insulin lispro (HumALOG/ADMELOG) 100 units/mL subcutaneous injection 1-6 Units  1-6 Units Subcutaneous TID AC    levalbuterol (XOPENEX) inhalation solution 1.25 mg  1.25 mg Nebulization TID    metoprolol succinate (TOPROL-XL) 24 hr tablet 100 mg  100 mg Oral Q12H    rOPINIRole (REQUIP) tablet 2 mg  2 mg Oral HS    umeclidinium 62.5 mcg/actuation inhaler AEPB 1 puff  1 puff Inhalation Daily    zolpidem (AMBIEN) tablet 10 mg  10 mg Oral HS PRN     Allergies   Allergen Reactions    Sotalol Other (See Comments)     Prolonged QTc leading to torsades de pointes     Penicillins Other (See Comments)     As a child calcium deposit in the arm     Ace Inhibitors GI Intolerance     Did feel well on it    Omeprazole Abdominal Pain, Rash and Vomiting     stomach pain, vomiting, rash       Vitals: Blood pressure 113/63, pulse 71, temperature 98.1 °F (36.7 °C), temperature source Oral,  "resp. rate 17, height 5' 2\" (1.575 m), weight 73 kg (161 lb), last menstrual period 02/01/1990, SpO2 99%, not currently breastfeeding., on room air, Body mass index is 29.45 kg/m².    Intake/Output Summary (Last 24 hours) at 7/14/2024 1612  Last data filed at 7/14/2024 0846  Gross per 24 hour   Intake 120 ml   Output --   Net 120 ml     Physical exam:     Physical Exam  Vitals reviewed.   Constitutional:       General: She is not in acute distress.     Appearance: Normal appearance. She is well-developed. She is not ill-appearing.   HENT:      Head: Normocephalic and atraumatic.   Eyes:      General: No scleral icterus.     Conjunctiva/sclera: Conjunctivae normal.   Neck:      Vascular: No JVD.   Cardiovascular:      Rate and Rhythm: Normal rate and regular rhythm.      Heart sounds: Normal heart sounds. No murmur heard.     No friction rub. No gallop.   Pulmonary:      Effort: Pulmonary effort is normal. No respiratory distress.      Breath sounds: Examination of the right-middle field reveals decreased breath sounds. Examination of the right-lower field reveals decreased breath sounds. Decreased breath sounds present. No wheezing, rhonchi or rales.   Musculoskeletal:      Cervical back: Neck supple.      Right lower leg: No edema.      Left lower leg: No edema.   Skin:     General: Skin is warm and dry.      Findings: No rash.   Neurological:      General: No focal deficit present.      Mental Status: She is alert and oriented to person, place, and time. Mental status is at baseline.   Psychiatric:         Mood and Affect: Mood normal.         Behavior: Behavior normal.     Labs: I have personally reviewed pertinent lab results.    Results from last 7 days   Lab Units 07/13/24  0628 07/13/24  0625   WBC Thousand/uL  --  7.98   HEMOGLOBIN g/dL  --  13.1   I STAT HEMOGLOBIN g/dl 13.3  --    HEMATOCRIT %  --  40.0   HEMATOCRIT, ISTAT % 39  --    PLATELETS Thousands/uL  --  281         Results from last 7 days   Lab " Units 07/14/24  0501 07/13/24  0628 07/13/24  0625   POTASSIUM mmol/L 4.2  --  4.6   CHLORIDE mmol/L 101  --  95*   CO2 mmol/L 23  --  23   CO2, I-STAT mmol/L  --  26  --    BUN mg/dL 31*  --  41*   CREATININE mg/dL 1.08  --  1.34*   CALCIUM mg/dL 8.8  --  9.5   GLUCOSE, ISTAT mg/dl  --  148*  --          Results from last 7 days   Lab Units 07/14/24  0501   MAGNESIUM mg/dL 2.1     Imaging and other studies: I have personally reviewed pertinent films in PACS    Code Status: Level 3 - DNAR and DNI    Thank you for allowing us to participate in the care of your patient.    Michael Choudhury M.D.

## 2024-07-14 NOTE — PROGRESS NOTES
"Progress Note - Trauma Tertiary [facility]   Farnaz Martin 78 y.o. female [id number]   Unit/Bed#: S -01 Encounter: [id number]     Assessment & Plan   Summary of Diagnosed Injuries:   Syncopal fall-no need for further imaging based on tertiary evaluation.  Back pain-CT imaging was negative for acute traumatic injury.    PLAN:  No further imaging or workup from a trauma standpoint, based on tertiary evaluation.  Continue medical management.  Please call with any questions or concerns.    VTE Prophylaxis:Sequential compression device (Venodyne) Eliquis    Disposition: Cleared by trauma.  Please contact with any questions or concerns.    Code status:  Level 3 - DNAR and DNI    Consultants: IP CONSULT TO CARDIOLOGY     Subjective     Mechanism of Injury:Fall     Chief Complaint: \"My back is stiff\"    HPI/Last 24 hour events: This is a 78-year-old female who suffered a syncopal fall where she laid on the ground for approximately 6 hours.  She was found to have no traumatic injuries and was admitted to the medical service for syncope workup.  On my exam this morning patient describes having back pain and stiffness.  She associates this with laying on the ground for so long.  It is worsened with movement.  She denies any numbness, tingling, weakness associated with it.  She states that she has been up and ambulatory.  She is been tolerating her diet.  No other complaints offered.     Objective   Vitals:   Temp:  [98.3 °F (36.8 °C)-100 °F (37.8 °C)] 98.7 °F (37.1 °C)  HR:  [41-65] 64  Resp:  [16-20] 17  BP: (110-166)/(55-87) 120/64    I/O       None             Physical Exam:   GENERAL APPEARANCE: No acute distress.  Sitting upright in chair.  NEURO: GCS is 15.  Light touch sensation intact in all extremities.  No lateralizing weakness.  HEENT: Normocephalic, atraumatic.  Neck supple.  CV: Regular rate and rhythm.  LUNGS: Right lung sounds diminished with scant crackles.  Respiratory effort is nonlabored.  GI: Abdomen " "is soft and nontender.  : Pelvis is stable.  MSK: No C, T, L-spine tenderness.  No palpable step-offs.  No extremity deformities.  Fully ranging all extremities.  SKIN: Warm, dry.    Invasive Devices       Peripheral Intravenous Line  Duration             Peripheral IV 07/13/24 Right Antecubital <1 day                         Lab Results: Results: I have personally reviewed all pertinent laboratory/tests results, BMP/CMP:   Lab Results   Component Value Date    SODIUM 130 (L) 07/14/2024    K 4.2 07/14/2024     07/14/2024    CO2 23 07/14/2024    BUN 31 (H) 07/14/2024    CREATININE 1.08 07/14/2024    CALCIUM 8.8 07/14/2024    EGFR 48 07/14/2024   , and CBC: No results found for: \"WBC\", \"HGB\", \"HCT\", \"MCV\", \"PLT\", \"ADJUSTEDWBC\", \"RBC\", \"MCH\", \"MCHC\", \"RDW\", \"MPV\", \"NRBC\"    Imaging Results: I have personally reviewed pertinent reports.    Chest Xray(s): See below   FAST exam(s): negative for acute findings   CT Scan(s): See below   Additional Xray(s): N/A     Other Studies:   TRAUMA - CT head wo contrast    Result Date: 7/13/2024  Impression: No acute intracranial abnormality. Hyperdensity on the left appearing related to meningioma, seen to better advantage on MRI from October 2023. I personally discussed this study with Peter Bingham on 7/13/2024 7:17 AM. Workstation performed: [id number]     TRAUMA - CT spine cervical wo contrast    Result Date: 7/13/2024  Impression: Stable compared to prior. No acute traumatic abnormality identified in the C-spine. Interval increased volume of right pleural fluid. I personally discussed this study with Peter Bingham on 7/13/2024 7:44 AM. Workstation performed: [id number]     TRAUMA - CT chest abdomen pelvis w contrast    Result Date: 7/13/2024  Impression: No acute traumatic abnormality identified. New large dependent right pleural effusion with associated atelectatic changes. Small left effusion. Global cardiomegaly, right greater than left. Dilated hepatic veins and IVC " suggesting right heart failure/regurgitation. New ascites. New gallbladder wall thickening. New presacral edema. Correlate with clinical findings for third spacing of fluid. Stable appearance of cystic lesion in the pancreatic head. Stable appearance of solid enhancing left renal cortical mass. I personally discussed this study with Peter Bingham on 7/13/2024 7:40 AM. Workstation performed: [id number]     XR Trauma multiple ([facility] trauma bay ONLY)    Result Date: 7/13/2024  Impression: No definite acute displaced fracture but see subsequent chest CT. Mild interstitial edema. Moderate right and small left pleural effusion, better shown on CT. Workstation performed: [id number]

## 2024-07-14 NOTE — CONSULTS
Consultation - Cardiology Team One  Farnaz Martin 78 y.o. female MRN: [id number]  Unit/Bed#: S -01 Encounter: [id number]    Inpatient consult to Cardiology  Consult performed by: JENNY Sapp  Consult ordered by: Kwabena Dey MD      Physician Requesting Consult: Ariella Hobson MD  Reason for Consult / Principal Problem: syncope    Assessment     Syncope vs fall  Hs troponin negative  ECG AV paced  Device interrogation- about 1 hour of AT/AFib daily, 3.9% burden. Atrial tracking turned off in June because it was tracking atrial tachycardia. Device functioning appropriately. Lower ventricular rate set to 60 bpm.  Confused at time of fall and after waking up  Low BPs in the mornings recently, SBP in the 90s. PCP recently stopped PM dose of diltiazem.    Paroxysmal atrial fibrillation/flutter  Hx of atrial fibrillation s/p ablation x 2, atrial flutter ablation as well  History of atrial tachycardia on device interrogations  PTA-Tambocor 150 mg twice daily, Toprol  mg twice daily and diltiazem 120 mg daily  History of QTc prolongation and torsades with sotalol  Anticoagulated on Eliquis 5 mg twice daily     3.  Large right pleural effusion  Worsening effusion despite overall stable volume status  Pulmonology consulted for possible thoracentesis and further management    4.  Chronic diastolic CHF  Taking lasix 20 mg daily  Weight: 161 lb  Dry weight: 156 lb  S/p IVF from ED due to KIMBERLEY with improved creatinine  Not significantly overloaded on exam     5.  HTN-stable, 24-hour average /66 on Toprol- mg every 12 hours, diltiazem 120 mg daily. Daughter reports low morning BPs     6.  HLD-on Zetia    7.  Hyponatremia-improving, sodium 130 today, from 128 yesterday     8.  Moderate to severe MR- LVEF 55%, normal wall motion, normal RV size/function. Moderate to severe MR on echo from May 2024    9.  Bronchiectasis- chronic cough, sees pulmonology    Plan  Discussed with attending  and EP, no indication that device or other arrhythmia contributing to her fall/syncopal event  Her volume status is overall stable despite being up a few pounds.  Discontinue IV fluids.  If creatinine is stable tomorrow, plan to resume oral Lasix  Continue anticoagulation with Eliquis 5 mg p.o. twice daily for stroke risk reduction  Continue flecainide 150 mg twice daily and Toprol- mg twice daily  Resume diltiazem 120 mg daily  Follow-up results of thoracentesis/pulmonology consultation  Continue to monitor on telemetry x 24 hours  Strict I/Os, daily weights, am BMP   Check limited echo for LV/RV function    History of Present Illness   HPI: Farnaz Martin is a 78 y.o. year old female with paroxysmal atrial fibrillation/flutter status post cryoablation with PVI 3/2021 with symptomatic bradycardia postprocedure and underwent MDT dual-chamber PPM (initiated on flecainide at that time), subsequently 8/2022 at [place] underwent repeat PVI as well as posterior wall isolation and CTI flutter ablation (was on amiodarone short-term ), followed by cardioversion 8/2023 for recurrent A-fib, meningioma (follows at [facility] ), chronic HFpEF, HTN, HLD, moderate MR, MGUS, type 2 diabetes, thyroid nodule status post lobectomy, and chronic hyponatremia. She follows with cardiologist Dr. Cunningham as well as Dr. Muhammad at [facility]. She last saw Dr. Muhammad as a telemedicine visit in 2022.     She presented to the ED on 7/13/2024 after a fall or possible syncopal event while attempting to use the bathroom.  She has poor recall of the events surrounding her fall.  She believes she was on the ground for approximately 6 hours before help arrived.  Fortunately, traumatic imaging is negative for acute injury.  CXR showed mild interstitial edema and moderate right and small left pleural effusion.  CT chest showed right pleural effusion, global cardiomegaly, right greater than left with dilated hepatic veins and IVC suggesting right heart  failure.  She was noted to have an KIMBERLEY for which she is receiving IV fluids with improvement in her sodium and creatinine today.  High-sensitivity troponins are negative.  Total CK 68.  There were concerns for possible bradycardia however device interrogation shows normal device function.  Cardiology consulted for further evaluation management of her possible syncopal event.    EKG reviewed personally: Paced    Telemetry reviewed personally: intermittently paced    Review of Systems   Constitutional: Positive for malaise/fatigue. Negative for chills, diaphoresis, fever and weight gain.   Cardiovascular:  Positive for dyspnea on exertion, palpitations (very brief, self limited) and syncope. Negative for chest pain, leg swelling and orthopnea.   Respiratory:  Positive for cough. Negative for sleep disturbances due to breathing and sputum production.    Musculoskeletal:  Positive for falls.   Gastrointestinal:  Negative for bloating, nausea and vomiting.   Neurological:  Positive for weakness. Negative for dizziness and light-headedness.   Psychiatric/Behavioral:  Negative for altered mental status.    All other systems reviewed and are negative.    Historical Information   Past Medical History:   Diagnosis Date    Actinic keratosis     last assessed - 06Jun2014    Arthritis     Atrial fibrillation with rapid ventricular response (HCC)     last assessed - 26Apr2016    Atrial fibrillation with rapid ventricular response (HCC) 04/19/2016    Basal cell carcinoma     Benign colon polyp     last assessed - 27Apr2015    Bronchiectasis without complication (HCC)     CHF (congestive heart failure) (HCC) 06/2022    Chronic diastolic CHF (congestive heart failure) (HCC)     Disease of thyroid gland     Effusion of knee joint right     Resolved - 19Apr2016    Esophageal reflux     Fibromyalgia     last assessed - 27Dec2017    Fibromyalgia, primary     GERD (gastroesophageal reflux disease)     Hyperlipidemia     Hypertension      Hyponatremia     Malignant neoplasm of thyroid gland (HCC)     Meningioma (HCC)     MGUS (monoclonal gammopathy of unknown significance)     Palpitations     last assessed - [id number]    Peroneal tendonitis, right     last assessed - [id number]    Right lumbar radiculopathy 03/17/2016    Thyroid cancer (HCC)     Thyroid nodule     Trochanteric bursitis of left hip 03/09/2018     Past Surgical History:   Procedure Laterality Date    CARDIAC ELECTROPHYSIOLOGY STUDY AND ABLATION  08/2022    CATARACT EXTRACTION Bilateral     COLONOSCOPY  03/2018    COLONOSCOPY W/ POLYPECTOMY  2021    repeat in 5 years    EYE SURGERY      OOPHORECTOMY      ID NEUROPLASTY &/TRANSPOS MEDIAN NRV CARPAL TUNNE Right 11/14/2019    Procedure: RELEASE CARPAL TUNNEL;  Surgeon: Onesimo Tate MD;  Location: [facility];  Service: Orthopedics    ID TOTAL THYROID LOBECTOMY UNI W/WO ISTHMUSECTOMY Left 12/16/2020    Procedure: Left THYROID LOBECTOMY;  Surgeon: Jhony Bhatt MD;  Location: [facility];  Service: ENT    RECTAL POLYPECTOMY      REPLACEMENT TOTAL KNEE Left     last assessed - [id number]    TONSILLECTOMY      TOTAL ABDOMINAL HYSTERECTOMY      TUBAL LIGATION      US GUIDED THYROID BIOPSY  10/14/2020     Social History     Substance and Sexual Activity   Alcohol Use Not Currently     Social History     Substance and Sexual Activity   Drug Use Never     Social History     Tobacco Use   Smoking Status Never   Smokeless Tobacco Never     Family History:   Family History   Problem Relation Age of Onset    Heart disease Mother     Diabetes Mother     Heart disease Father     Coronary artery disease Father     Stroke Father         cerebrovascular accident    Heart attack Father         myocardial infarction    Sudden death Father         scd    Other Family         Back disorder    Coronary artery disease Family     Neuropathy Family     Osteoporosis Family     No Known Problems Daughter     No Known Problems Maternal Grandmother     No Known Problems  Maternal Grandfather     No Known Problems Paternal Grandmother     No Known Problems Paternal Grandfather     Cancer Maternal Uncle     Breast cancer Maternal Aunt 65    No Known Problems Son     No Known Problems Maternal Aunt     No Known Problems Maternal Aunt     No Known Problems Maternal Aunt     No Known Problems Paternal Aunt     No Known Problems Paternal Aunt     Anuerysm Neg Hx     Clotting disorder Neg Hx     Arrhythmia Neg Hx     Heart failure Neg Hx        Meds/Allergies   all current active meds have been reviewed and current meds:   Current Facility-Administered Medications   Medication Dose Route Frequency    acetylcysteine (MUCOMYST) 200 mg/mL inhalation solution 800 mg  4 mL Nebulization Daily    albuterol (PROVENTIL HFA,VENTOLIN HFA) inhaler 2 puff  2 puff Inhalation Q6H PRN    apixaban (ELIQUIS) tablet 5 mg  5 mg Oral BID    ascorbic acid (VITAMIN C) tablet 500 mg  500 mg Oral Daily    benzonatate (TESSALON PERLES) capsule 100 mg  100 mg Oral TID PRN    ezetimibe (ZETIA) tablet 10 mg  10 mg Oral HS    famotidine (PEPCID) tablet 10 mg  10 mg Oral Daily    flecainide (TAMBOCOR) tablet 150 mg  150 mg Oral Q12H JOAQUINA    gabapentin (NEURONTIN) capsule 300 mg  300 mg Oral HS    guaiFENesin (MUCINEX) 12 hr tablet 600 mg  600 mg Oral Q12H JOAQUINA    insulin lispro (HumALOG/ADMELOG) 100 units/mL subcutaneous injection 1-6 Units  1-6 Units Subcutaneous TID AC    levalbuterol (XOPENEX) inhalation solution 1.25 mg  1.25 mg Nebulization TID    metoprolol succinate (TOPROL-XL) 24 hr tablet 100 mg  100 mg Oral Q12H    rOPINIRole (REQUIP) tablet 2 mg  2 mg Oral HS    umeclidinium 62.5 mcg/actuation inhaler AEPB 1 puff  1 puff Inhalation Daily    zolpidem (AMBIEN) tablet 10 mg  10 mg Oral HS PRN        Allergies   Allergen Reactions    Sotalol Other (See Comments)     Prolonged QTc leading to torsades de pointes     Penicillins Other (See Comments)     As a child calcium deposit in the arm     Ace Inhibitors GI  Intolerance     Did feel well on it    Omeprazole Abdominal Pain, Rash and Vomiting     stomach pain, vomiting, rash     Objective   Vitals: Blood pressure 136/79, pulse 71, temperature 97.9 °F (36.6 °C), resp. rate 18, weight 76.2 kg (167 lb 15.9 oz), last menstrual period 1990, SpO2 98%, not currently breastfeeding., Body mass index is 30.73 kg/m².,     Systolic (24hrs), Av , Min:110 , Max:166     Diastolic (24hrs), Av, Min:55, Max:79      Intake/Output Summary (Last 24 hours) at 2024 0959  Last data filed at 2024 0846  Gross per 24 hour   Intake 120 ml   Output --   Net 120 ml     Wt Readings from Last 3 Encounters:   24 76.2 kg (167 lb 15.9 oz)   24 73.7 kg (162 lb 8 oz)   24 69.9 kg (154 lb)     Invasive Devices       Peripheral Intravenous Line  Duration             Peripheral IV 24 Right Antecubital 1 day                    Physical Exam  Vitals reviewed.   Constitutional:       General: She is not in acute distress.  Neck:      Vascular: No hepatojugular reflux or JVD.   Cardiovascular:      Rate and Rhythm: Normal rate and regular rhythm.      Heart sounds: Normal heart sounds. No murmur heard.     No friction rub. No gallop.   Pulmonary:      Effort: No respiratory distress.      Breath sounds: No rales.      Comments: Decreased at bases, 94% RA  Dry cough throughout exam  Abdominal:      General: Bowel sounds are normal. There is no distension.      Palpations: Abdomen is soft.      Tenderness: There is no abdominal tenderness.   Musculoskeletal:         General: Tenderness (back) present. Normal range of motion.      Cervical back: Neck supple.      Right lower leg: No edema.      Left lower leg: No edema.   Skin:     General: Skin is warm and dry.      Capillary Refill: Capillary refill takes less than 2 seconds.      Findings: No erythema.   Neurological:      Mental Status: She is alert and oriented to person, place, and time.   Psychiatric:          Mood and Affect: Mood normal.      Comments: Flat affect     LABORATORY RESULTS:  Results from last 7 days   Lab Units 07/13/24  0625   CK TOTAL U/L 68     CBC with diff:   Results from last 7 days   Lab Units 07/13/24 0628 07/13/24  0625   WBC Thousand/uL  --  7.98   HEMOGLOBIN g/dL  --  13.1   I STAT HEMOGLOBIN g/dl 13.3  --    HEMATOCRIT %  --  40.0   HEMATOCRIT, ISTAT % 39  --    MCV fL  --  92   PLATELETS Thousands/uL  --  281   RBC Million/uL  --  4.36   MCH pg  --  30.0   MCHC g/dL  --  32.8   RDW %  --  13.9   MPV fL  --  9.9   NRBC AUTO /100 WBCs  --  0       CMP:  Results from last 7 days   Lab Units 07/14/24  0501 07/13/24 0628 07/13/24 0625   POTASSIUM mmol/L 4.2  --  4.6   CHLORIDE mmol/L 101  --  95*   CO2 mmol/L 23  --  23   CO2, I-STAT mmol/L  --  26  --    BUN mg/dL 31*  --  41*   CREATININE mg/dL 1.08  --  1.34*   GLUCOSE, ISTAT mg/dl  --  148*  --    CALCIUM mg/dL 8.8  --  9.5   EGFR ml/min/1.73sq m 48  --  37       BMP:  Results from last 7 days   Lab Units 07/14/24 0501 07/13/24 0628 07/13/24  0625   POTASSIUM mmol/L 4.2  --  4.6   CHLORIDE mmol/L 101  --  95*   CO2 mmol/L 23  --  23   CO2, I-STAT mmol/L  --  26  --    BUN mg/dL 31*  --  41*   CREATININE mg/dL 1.08  --  1.34*   GLUCOSE, ISTAT mg/dl  --  148*  --    CALCIUM mg/dL 8.8  --  9.5       Lab Results   Component Value Date    CREATININE 1.08 07/14/2024    CREATININE 1.34 (H) 07/13/2024    CREATININE 0.98 06/28/2024       Lab Results   Component Value Date    NTBNP 761 (H) 01/08/2021    NTBNP 2,773 (H) 01/03/2021    NTBNP 506 (H) 03/25/2019          Results from last 7 days   Lab Units 07/14/24  0501   MAGNESIUM mg/dL 2.1          Results from last 7 days   Lab Units 07/13/24  0625   HEMOGLOBIN A1C % 8.8*                  Lipid Profile:   Lab Results   Component Value Date    CHOL 205 05/06/2015    CHOL 195 07/10/2014     Lab Results   Component Value Date    HDL 46 (L) 01/09/2024    HDL 53 07/03/2023    HDL 66 04/01/2021     Lab  Results   Component Value Date    LDLCALC 64 2024    LDLCALC 96 2023    LDLCALC 88 2021     Lab Results   Component Value Date    TRIG 63 2024    TRIG 68 2023    TRIG 112 2021       Cardiac testing:   Results for orders placed during the hospital encounter of 21    Echo complete with contrast if indicated    [facility]  [address], [place] [id number] [phone]    Transthoracic Echocardiogram  2D, M-mode, Doppler, and Color Doppler    Study date:  2021    Patient: TANISHA LEVINE  MR number: [id number]  Account number: [id number]  : 1945  Age: 75 years  Gender: Female  Status: Inpatient  Location: Bedside  Height: 62 in  Weight: 151.6 lb  BP: 126/ 94 mmHg    Indications: Atrial fibrillation    Diagnoses: I48.0 - Atrial fibrillation    Sonographer:  IGNACIO Cameron  Primary Physician:  Naveen Monsivais MD  Referring Physician:  Shantal Huff MD  Group:  [facility]  Interpreting Physician:  Kwabena Seymour MD    SUMMARY    LEFT VENTRICLE:  Systolic function was normal. Ejection fraction was estimated to be 55 %.  There were no regional wall motion abnormalities.  Wall thickness was at the upper limits of normal.  Doppler parameters were consistent with elevated ventricular end-diastolic filling pressure.    LEFT ATRIUM:  The atrium was mildly to moderately dilated.    RIGHT ATRIUM:  The atrium was mildly dilated.    MITRAL VALVE:  There was mild stenosis.    TRICUSPID VALVE:  There was mild to moderate regurgitation.  Pulmonary artery systolic pressure was mildly increased.    HISTORY: PRIOR HISTORY: HLD, HTN, PAF, chronic CHF, thyroid nodule    PROCEDURE: The procedure was performed at the bedside. This was a routine study. The transthoracic approach was used. The study included complete 2D imaging, M-mode, complete spectral Doppler, and color Doppler. The heart rate was 101 bpm,  at the start  of the study. Images were obtained from the parasternal, apical, subcostal, and suprasternal notch acoustic windows. Echocardiographic views were limited due to lung interference. This was a technically difficult study.    LEFT VENTRICLE: Size was normal. Systolic function was normal. Ejection fraction was estimated to be 55 %. There were no regional wall motion abnormalities. Wall thickness was at the upper limits of normal. DOPPLER: Transmitral flow  pattern: atrial fibrillation. Left ventricular diastolic function parameters were abnormal. Doppler parameters were consistent with elevated ventricular end-diastolic filling pressure.    RIGHT VENTRICLE: The size was normal. Systolic function was normal. Wall thickness was normal.    LEFT ATRIUM: The atrium was mildly to moderately dilated.    RIGHT ATRIUM: The atrium was mildly dilated.    MITRAL VALVE: Valve structure was normal. There was normal leaflet separation. DOPPLER: The transmitral velocity was within the normal range. There was mild stenosis. There was no significant regurgitation.    AORTIC VALVE: The valve was trileaflet. Leaflets exhibited normal thickness and normal cuspal separation. DOPPLER: Transaortic velocity was within the normal range. There was no evidence for stenosis. There was no significant  regurgitation.    TRICUSPID VALVE: The valve structure was normal. There was normal leaflet separation. DOPPLER: The transtricuspid velocity was within the normal range. There was no evidence for stenosis. There was mild to moderate regurgitation. Pulmonary  artery systolic pressure was mildly increased.    PULMONIC VALVE: Leaflets exhibited normal thickness, no calcification, and normal cuspal separation. DOPPLER: The transpulmonic velocity was within the normal range. There was no significant regurgitation.    PERICARDIUM: There was no pericardial effusion. The pericardium was normal in appearance.    AORTA: The root exhibited normal  size.    SYSTEMIC VEINS: IVC: The inferior vena cava was normal in size.    PULMONARY VEINS: DOPPLER: Doppler signals were not recordable in the pulmonary vein(s).    SYSTEM MEASUREMENT TABLES    2D  %FS: 37.15 %  Ao Diam: 2.74 cm  Ao asc: 2.72 cm  EDV(Teich): 103.17 ml  EF(Teich): 67.06 %  ESV(Teich): 33.98 ml  IVSd: 0.94 cm  LA Area: 13.2 cm2  LA Diam: 3.98 cm  LVEDV MOD A4C: 32.01 ml  LVEF MOD A4C: 63.74 %  LVESV MOD A4C: 11.61 ml  LVIDd: 4.72 cm  LVIDs: 2.96 cm  LVLd A4C: 5.9 cm  LVLs A4C: 4.79 cm  LVPWd: 0.93 cm  RA Area: 8.67 cm2  RVIDd: 3.35 cm  SV MOD A4C: 20.4 ml  SV(Teich): 69.19 ml    CW  TR MaxP.39 mmHg  TR Vmax: 2.89 m/s    MM  TAPSE: 1.51 cm    [facility] Accredited Echocardiography Laboratory    Prepared and electronically signed by    Kwabena Seymour MD  Signed 2021 11:05:34    No results found for this or any previous visit.    No valid procedures specified.  Results for orders placed during the hospital encounter of 10/05/22    NM myocardial perfusion spect (stress and/or rest)    Interpretation Summary    Resting ECG: ECG is abnormal. Resting ECG shows no ST-segment deviation. Patient a paced and V sensed.    Perfusion Defect Conclusion: The stress/rest perfusion ratio is 1.04 . There is no evidence of transient ischemic dilation (TID).    Perfusion: There is a left ventricular perfusion defect that is small in size with mild reduction in uptake present in the mid to apical anterior and anteroseptal location(s) that is predominantly fixed. The defect appears to be an artifact caused by breast attenuation.    Stress Function: Left ventricular function post-stress is normal. Post-stress ejection fraction is 70 %.    Negative exercise pharmacological stress test    Imaging: I have personally reviewed pertinent reports.   and I have personally reviewed pertinent films in PACS  TRAUMA - CT head wo contrast    Result Date: 2024  Narrative: CT BRAIN - WITHOUT CONTRAST  INDICATION:   TRAUMA. COMPARISON: 5/24/2024 TECHNIQUE:  CT examination of the brain was performed.  Multiplanar 2D reformatted images were created from the source data. Radiation dose length product (DLP) for this visit:  1005 mGy-cm .  This examination, like all CT scans performed in the [facility], was performed utilizing techniques to minimize radiation dose exposure, including the use of iterative reconstruction and automated exposure control. IMAGE QUALITY:  Diagnostic. FINDINGS: PARENCHYMA:  No intracranial decreased attenuation is noted in periventricular and subcortical white matter demonstrating an appearance that is statistically most likely to represent mild microangiopathic change; this appearance is similar when compared to most recent prior examination. No CT signs of acute infarction.  No intracranial mass, mass effect or midline shift.  No acute parenchymal hemorrhage.  ass, mass effect or midline shift. No CT signs of acute infarction.  No acute parenchymal hemorrhage. There is hyperdensity in the left frontotemporal region which is not significantly changed compared to the prior. This appears to be related to a meningioma noted in October 2023. VENTRICLES AND EXTRA-AXIAL SPACES:  Normal for the patient's age. VISUALIZED ORBITS: Normal visualized orbits. PARANASAL SINUSES: Mild mucosal thickening in the left maxilla without significant change. CALVARIUM AND EXTRACRANIAL SOFT TISSUES:  Normal.     Impression: No acute intracranial abnormality. Hyperdensity on the left appearing related to meningioma, seen to better advantage on MRI from October 2023. I personally discussed this study with Peter Bingham on 7/13/2024 7:17 AM. Workstation performed: [id number]     TRAUMA - CT spine cervical wo contrast    Result Date: 7/13/2024  Narrative: CT CERVICAL SPINE - WITHOUT CONTRAST INDICATION:   TRAUMA. COMPARISON: 5/24/2024 TECHNIQUE:  CT examination of the cervical spine was performed  without intravenous contrast.  Contiguous axial images were obtained. Multiplanar 2D reformatted images were created from the source data. Radiation dose length product (DLP) for this visit:  380 mGy-cm .  This examination, like all CT scans performed in the [facility], was performed utilizing techniques to minimize radiation dose exposure, including the use of iterative reconstruction and automated exposure control. IMAGE QUALITY:  Diagnostic. FINDINGS: ALIGNMENT:  Normal alignment of the cervical spine. No subluxation. VERTEBRAE:  No fracture. Stable small low suspicion well-defined sclerotic focus in the left lateral inferior aspect of C7. DEGENERATIVE CHANGES:  Mild multilevel cervical degenerative changes are noted without critical central canal stenosis. PREVERTEBRAL AND PARASPINAL SOFT TISSUES:    stable well-defined cystic appearing hypodense nodule in the right thyroid, 2.2 x 2.3 cm.    Correlates well with a large cyst seen on prior thyroid from March, refer to ultrasound findings. Prior left thyroidectomy noted. THORACIC INLET: Interval enlargement of right pleural fluid, appearing moderate.     Impression: Stable compared to prior. No acute traumatic abnormality identified in the C-spine. Interval increased volume of right pleural fluid. I personally discussed this study with Peter Bingham on 7/13/2024 7:44 AM. Workstation performed: [id number]     TRAUMA - CT chest abdomen pelvis w contrast    Result Date: 7/13/2024  Narrative: CT CHEST, ABDOMEN AND PELVIS WITH IV CONTRAST INDICATION: TRAUMA. COMPARISON: May 14, 2024, January 5, 2024 TECHNIQUE: CT examination of the chest, abdomen and pelvis was performed. Multiplanar 2D reformatted images were created from the source data. This examination, like all CT scans performed in the [facility], was performed utilizing techniques to minimize radiation dose exposure, including the use of iterative reconstruction and automated  exposure control. Radiation dose length product (DLP) for this visit: 979 mGy-cm IV Contrast: 100 mL of iohexol (OMNIPAQUE) Enteric Contrast: Not administered. FINDINGS: CHEST LUNGS: Wedge-shaped and linear opacities in the lung bases, right lower lobe and right middle lobe favoring compressive atelectasis. PLEURA: New large right pleural fluid small left pleural fluid. HEART/GREAT VESSELS: Mild global cardiomegaly. Dilated hepatic IVC and hepatic veins suggesting possible right heart regurgitation. No thoracic aortic aneurysm. MEDIASTINUM AND BETTY: Unremarkable. CHEST WALL AND LOWER NECK: Unremarkable. ABDOMEN LIVER/BILIARY TREE: Stable hepatomegaly.. GALLBLADDER: Gallbladder caliber remains within normal limits. Mild gallbladder wall edema present without pericholecystic inflammation. Suggesting may be related to systemic etiologies. SPLEEN: Unremarkable. PANCREAS: Cystic changes again noted in the head of the pancreas, stable compared to priors. ADRENAL GLANDS: Unremarkable. KIDNEYS/URETERS: Stable size and appearance of the solid enhancing left renal cortical mass, 1.7 cm. No new findings related to this lesion. STOMACH AND BOWEL: Unremarkable. APPENDIX: No findings to suggest appendicitis. ABDOMINOPELVIC CAVITY: No ascites. Presacral edema present as well. No pneumoperitoneum. No lymphadenopathy. VESSELS: Unremarkable for patient's age. PELVIS REPRODUCTIVE ORGANS: Unremarkable for patient's age. URINARY BLADDER: Unremarkable. ABDOMINAL WALL/INGUINAL REGIONS: Unremarkable. BONES: No acute fracture or suspicious osseous lesion. No acute fractures. Prior healed right rib fracture, anterior third-fifth ribs     Impression: No acute traumatic abnormality identified. New large dependent right pleural effusion with associated atelectatic changes. Small left effusion. Global cardiomegaly, right greater than left. Dilated hepatic veins and IVC suggesting right heart failure/regurgitation. New ascites. New gallbladder  wall thickening. New presacral edema. Correlate with clinical findings for third spacing of fluid. Stable appearance of cystic lesion in the pancreatic head. Stable appearance of solid enhancing left renal cortical mass. I personally discussed this study with Peter Bingham on 7/13/2024 7:40 AM. Workstation performed: [id number]     XR Trauma multiple ([facility] trauma bay ONLY)    Result Date: 7/13/2024  Narrative: CHEST INDICATION:   TRAUMA. COMPARISON: CXR 5/24/2024, chest CT 7/13/2024. EXAM PERFORMED/VIEWS:  XR TRAUMA MULTIPLE. FINDINGS: Mild interstitial edema. Moderate right and small left pleural effusion, better shown on CT. No pneumothorax. Moderate cardiomegaly with left subclavian pacemaker leads in right atrium and right ventricle. Skeleton normal for age.  No acute displaced fractures. Old right rib fractures. Upper abdomen normal.     Impression: No definite acute displaced fracture but see subsequent chest CT. Mild interstitial edema. Moderate right and small left pleural effusion, better shown on CT. Workstation performed: [id number]     Cardiac EP device report    Result Date: 6/17/2024  Narrative: MDT-DUAL CHAMBER PPM (AAIR-DDDR MODE)/ ACTIVE SYSTEM IS MRI CONDITIONAL DEVICE INTERROGATED IN THE [place] OFFICE. BATTERY VOLTAGE ADEQUATE (10.2 YRS). AP-56%, -68% (>40% DDIR@60PPM). ALL LEAD PARAMETERS WITHIN NORMAL LIMITS. 60 AT/AF EPISODES & CURRENTLY IN AT/AFLUTTER. AT/AF BURDEN SINCE 5/24/24-2.4%. PT ON ELIQUIS, DILTIAZEM, FLECAINIDE & METOPROLOL. PT SEEN BY CHRISS DUVAL. NORMAL DEVICE FUNCTION. GV     Counseling / Coordination of Care  Total floor / unit time spent today 45 minutes.  Greater than 50% of total time was spent with the patient and / or family counseling and / or coordination of care.  A description of the counseling / coordination of care: Review of history, current assessment, development of a plan.    Code Status: Level 3 - DNAR and DNI    ** Please Note: Dragon 360 Dictation  voice to text software may have been used in the creation of this document. **

## 2024-07-14 NOTE — ASSESSMENT & PLAN NOTE
On admission, CT CAP with contrast: New large dependent right pleural effusion with associated atelectatic changes. Small left effusion.  In the setting of bronchiectasis, pulmonary hypertension and possibly new onset RV failure.  Repeat echo.  Pulmonary consult for potential thoracentesis and the further management.

## 2024-07-14 NOTE — ASSESSMENT & PLAN NOTE
Patient with history of Atrial fibrillation, with PPM  EKG absent P-waves; Wide QRS waves; Electronically paced    PLAN    Eliquis 5 mg BID  Continue flecainide 150 mg twice daily and Toprol- mg twice daily  Continue diltiazem 120 mg daily  Telemetry

## 2024-07-14 NOTE — ASSESSMENT & PLAN NOTE
Improved with overnight IV normal saline.  Likely second to poor p.o. intake as reported per daughter.  Encourage p.o. intake.

## 2024-07-14 NOTE — ASSESSMENT & PLAN NOTE
Patient admitted for fall due to unknown cause.  Patient has multiple risk factors such as heart conditions; endorsed dehydration; history of urinary tract infections.  Patient has sick sinus with pacemaker with possible dysfunction; consider cardiac etiology  Patient appears hypovolemic on admission  CT Head unremarkable for acute cranial etiology.  Multifactorial-orthostatic hypotension as per reported by daughter vs likely cardiac etiology with arrhythmia, bradycardia, new onset RV failure vs vasovagal    PLAN  Orthostatic pressure- status post IV fluid.  PPM interrogation- episodes of A-fib with RVR and atrial tachycardia.  7/14 overnight did notice few episode of nonsustained VT with maximal 8 beats on telemetry.  Consult cardiology- may need EP service eval.  Encourage PO intake

## 2024-07-14 NOTE — ASSESSMENT & PLAN NOTE
Wt Readings from Last 3 Encounters:   07/14/24 73 kg (161 lb)   07/11/24 73.7 kg (162 lb 8 oz)   06/17/24 69.9 kg (154 lb)     ECHO 5/25/2024  EF 55%; Moderate to severe Mitral regurgitation  CT: Mild global cardiomegaly. Dilated hepatic IVC and hepatic veins suggesting possible right heart regurgitation- concerning for RV failure.  Patient appears Euvolemic on admission.    PLAN  Consult cardiology- may repeat Echo.  Daily standing weight, I/O's.  May resume lasix tomorrow if hypovolemia improves.

## 2024-07-14 NOTE — PROGRESS NOTES
[facility]  Progress Note  Name: Farnaz Martin I  MRN: [id number]  Unit/Bed#: S -01 I Date of Admission: 7/13/2024   Date of Service: 7/14/2024 I Hospital Day: 0    Assessment & Plan   * Syncope and collapse  Assessment & Plan  Patient admitted for fall due to unknown cause.  Patient has multiple risk factors such as heart conditions; endorsed dehydration; history of urinary tract infections.  Patient has sick sinus with pacemaker with possible dysfunction; consider cardiac etiology  Patient appears hypovolemic on admission  CT Head unremarkable for acute cranial etiology.  Multifactorial-orthostatic hypotension as per reported by daughter vs likely cardiac etiology with arrhythmia, bradycardia, new onset RV failure vs vasovagal    PLAN  Orthostatic pressure- status post IV fluid.  PPM interrogation- episodes of A-fib with RVR and atrial tachycardia.  7/14 overnight did notice few episode of nonsustained VT with maximal 8 beats on telemetry.  Consult cardiology- may need EP service eval.  Encourage PO intake      Pleural effusion, right  Assessment & Plan  On admission, CT CAP with contrast: New large dependent right pleural effusion with associated atelectatic changes. Small left effusion.  In the setting of bronchiectasis, pulmonary hypertension and possibly new onset RV failure.  Repeat echo.  Pulmonary consult for potential thoracentesis and the further management.    Fall  Assessment & Plan  PT/ OT eval.    Type 2 diabetes mellitus with microalbuminuria, without long-term current use of insulin ([facility])  Assessment & Plan  Lab Results   Component Value Date    HGBA1C 8.8 (H) 07/13/2024       Recent Labs     07/11/24  1405 07/13/24  2102 07/14/24  0726 07/14/24  1034   POCGLU 183* 212* 118 229*         PLAN  Hold Farxiga  Insulin sliding scale  Diabetic diet  Monitor blood glucose      History of sick sinus syndrome s/p PPM  Assessment & Plan  Patient has pacemaker placement  March 15, 2021    PLAN  Cardiology Consultation for evaluation    Paroxysmal atrial fibrillation (HCC)  Assessment & Plan  Patient with history of Atrial fibrillation, with PPM  EKG absent P-waves; Wide QRS waves; Electronically paced    PLAN    Eliquis 5 mg BID  Continue flecainide 150 mg twice daily and Toprol- mg twice daily  Continue diltiazem 120 mg daily  Telemetry     Chronic heart failure with preserved ejection fraction (HCC)  Assessment & Plan  Wt Readings from Last 3 Encounters:   07/14/24 73 kg (161 lb)   07/11/24 73.7 kg (162 lb 8 oz)   06/17/24 69.9 kg (154 lb)     ECHO 5/25/2024  EF 55%; Moderate to severe Mitral regurgitation  CT: Mild global cardiomegaly. Dilated hepatic IVC and hepatic veins suggesting possible right heart regurgitation- concerning for RV failure.  Patient appears Euvolemic on admission.    PLAN  Consult cardiology- may repeat Echo.  Daily standing weight, I/O's.  May resume lasix tomorrow if hypovolemia improves.    Hyponatremia  Assessment & Plan  Improved with overnight IV normal saline.  Likely second to poor p.o. intake as reported per daughter.  Encourage p.o. intake.    Insomnia  Assessment & Plan  PLAN  Continue home medication Ambien 10mg PRN    Hyperlipidemia  Assessment & Plan  Triglyceride 63; LDL 64; HDL 46; Cholesterol 123    PLAN  Continue Ezetemibe 10mg    Essential hypertension  Assessment & Plan  Monitor BP.  Meds per A-fib           VTE Pharmacologic Prophylaxis: VTE Score: 5 High Risk (Score >/= 5) - Pharmacological DVT Prophylaxis Ordered: apixaban (Eliquis). Sequential Compression Devices Ordered.    Mobility:   Basic Mobility Inpatient Raw Score: 24  JH-HLM Goal: 8: Walk 250 feet or more  JH-HLM Achieved: 7: Walk 25 feet or more  JH-HLM Goal achieved. Continue to encourage appropriate mobility.    Patient Centered Rounds: I performed bedside rounds with nursing staff today.  Discussions with Specialists or Other Care Team Provider: Cards    Education  and Discussions with Family / Patient: Updated  (daughter) via phone.    Current Length of Stay: 0 day(s)  Current Patient Status: Observation   Discharge Plan: Anticipate discharge in 24-48 hrs to discharge location to be determined pending rehab evaluations.    Code Status: Level 3 - DNAR and DNI    Subjective:   Patient was seen and examined at bedside this morning.  OOA x 4, NAD.  The patient is well-known to me.    Patient denied any chest pain, shortness of breath, palpitation, lightheadedness, NVD.  Reported good appetite however based on my conversation with daughter, patient was not eating well in the past few days.    Overnight, patient had a few episodes of NSVT with maximal 8 beats on telemetry.  Asymptomatic.    Objective:     Vitals:   Temp (24hrs), Av.6 °F (37 °C), Min:97.9 °F (36.6 °C), Max:100 °F (37.8 °C)    Temp:  [97.9 °F (36.6 °C)-100 °F (37.8 °C)] 97.9 °F (36.6 °C)  HR:  [47-71] 71  Resp:  [16-18] 18  BP: (110-136)/(55-79) 136/79  SpO2:  [81 %-98 %] 98 %  Body mass index is 29.45 kg/m².     Input and Output Summary (last 24 hours):     Intake/Output Summary (Last 24 hours) at 2024 1255  Last data filed at 2024 0846  Gross per 24 hour   Intake 120 ml   Output --   Net 120 ml       Physical Exam:   Physical Exam  Vitals and nursing note reviewed.   Constitutional:       General: She is not in acute distress.     Appearance: She is well-developed. She is not ill-appearing.   HENT:      Head: Normocephalic and atraumatic.      Right Ear: External ear normal.      Left Ear: External ear normal.      Nose: No rhinorrhea.      Mouth/Throat:      Mouth: Mucous membranes are moist.   Eyes:      General: No scleral icterus.        Right eye: No discharge.         Left eye: No discharge.      Extraocular Movements: Extraocular movements intact.      Conjunctiva/sclera: Conjunctivae normal.   Cardiovascular:      Rate and Rhythm: Normal rate. Rhythm irregular.      Heart sounds:  Murmur heard.   Pulmonary:      Effort: Pulmonary effort is normal. No respiratory distress.   Chest:      Chest wall: No tenderness.   Abdominal:      General: Bowel sounds are normal.      Palpations: Abdomen is soft.      Tenderness: There is no abdominal tenderness. There is no guarding or rebound.   Musculoskeletal:         General: No swelling or tenderness. Normal range of motion.      Cervical back: Normal range of motion and neck supple. No rigidity.   Lymphadenopathy:      Cervical: No cervical adenopathy.   Skin:     General: Skin is warm and dry.      Capillary Refill: Capillary refill takes less than 2 seconds.   Neurological:      Mental Status: She is alert and oriented to person, place, and time.   Psychiatric:         Mood and Affect: Mood normal.         Behavior: Behavior normal.         Thought Content: Thought content normal.         Judgment: Judgment normal.          Additional Data:     Labs:  Results from last 7 days   Lab Units 07/13/24  0628 07/13/24  0625   WBC Thousand/uL  --  7.98   HEMOGLOBIN g/dL  --  13.1   I STAT HEMOGLOBIN g/dl 13.3  --    HEMATOCRIT %  --  40.0   HEMATOCRIT, ISTAT % 39  --    PLATELETS Thousands/uL  --  281   SEGS PCT %  --  66   LYMPHO PCT %  --  20   MONO PCT %  --  11   EOS PCT %  --  2     Results from last 7 days   Lab Units 07/14/24  0501   SODIUM mmol/L 130*   POTASSIUM mmol/L 4.2   CHLORIDE mmol/L 101   CO2 mmol/L 23   BUN mg/dL 31*   CREATININE mg/dL 1.08   ANION GAP mmol/L 6   CALCIUM mg/dL 8.8   GLUCOSE RANDOM mg/dL 123         Results from last 7 days   Lab Units 07/14/24  1034 07/14/24  0726 07/13/24  2102 07/11/24  1405   POC GLUCOSE mg/dl 229* 118 212* 183*     Results from last 7 days   Lab Units 07/13/24  0625   HEMOGLOBIN A1C % 8.8*           Lines/Drains:  Invasive Devices       Peripheral Intravenous Line  Duration             Peripheral IV 07/13/24 Right Antecubital 1 day                      Telemetry:  Telemetry Orders (From admission,  onward)               24 Hour Telemetry Monitoring  Continuous x 24 Hours (Telem)        Question:  Reason for 24 Hour Telemetry  Answer:  Arrhythmias requiring acute medical intervention / PPM or ICD malfunction                     Telemetry Reviewed:  Normal sinus rhythm with episodes of A-fib/atrial tachycardia/NSVT  Indication for Continued Telemetry Use: Syncope             Imaging: Reviewed radiology reports from this admission including:    TRAUMA - CT head wo contrast   Final Result by Sindi Campos MD (07/13 0745)      No acute intracranial abnormality. Hyperdensity on the left appearing related to meningioma, seen to better advantage on MRI from October 2023.         I personally discussed this study with Peter Bingham on 7/13/2024 7:17 AM.                  Workstation performed: [id number]         TRAUMA - CT spine cervical wo contrast   Final Result by Sindi Campos MD (07/13 0744)      Stable compared to prior. No acute traumatic abnormality identified in the C-spine.      Interval increased volume of right pleural fluid.         I personally discussed this study with Peter Bingham on 7/13/2024 7:44 AM.                  Workstation performed: [id number]         TRAUMA - CT chest abdomen pelvis w contrast   Final Result by Sindi Campos MD (07/13 0744)      No acute traumatic abnormality identified.      New large dependent right pleural effusion with associated atelectatic changes. Small left effusion.      Global cardiomegaly, right greater than left. Dilated hepatic veins and IVC suggesting right heart failure/regurgitation.      New ascites. New gallbladder wall thickening. New presacral edema. Correlate with clinical findings for third spacing of fluid.      Stable appearance of cystic lesion in the pancreatic head.      Stable appearance of solid enhancing left renal cortical mass.         I personally discussed this study with Peter Bingham on  7/13/2024 7:40 AM.               Workstation performed: [id number]         XR Trauma multiple ([facility] trauma bay ONLY)   Final Result by Bertha Richter MD (07/13 0702)      No definite acute displaced fracture but see subsequent chest CT.      Mild interstitial edema.      Moderate right and small left pleural effusion, better shown on CT.               Workstation performed: [id number]         XR chest 1 view    (Results Pending)       Recent Cultures (last 7 days):         Last 24 Hours Medication List:   Current Facility-Administered Medications   Medication Dose Route Frequency Provider Last Rate    acetaminophen  650 mg Oral Q8H PRN Sean Foster MD      acetylcysteine  4 mL Nebulization Daily Ariella Hobson MD      albuterol  2 puff Inhalation Q6H PRN Sean Foster MD      apixaban  5 mg Oral BID Betsy Srivastava MD      ascorbic acid  500 mg Oral Daily Betsy Srivastava MD      benzonatate  100 mg Oral TID PRN Betsy Srivastava MD      diltiazem  120 mg Oral Daily JENNY Sapp      ezetimibe  10 mg Oral HS Betsy Srivastava MD      famotidine  10 mg Oral Daily Betsy Srivastava MD      flecainide  150 mg Oral Q12H [facility] Kwabena Dey MD      gabapentin  300 mg Oral HS Betsy Srivastava MD      guaiFENesin  600 mg Oral Q12H [facility] Sean Foster MD      insulin lispro  1-6 Units Subcutaneous TID  Kwabena Dey MD      levalbuterol  1.25 mg Nebulization TID Addison Purvis MD      metoprolol succinate  100 mg Oral Q12H Kwabena Dey MD      rOPINIRole  2 mg Oral HS Betsy Srivastava MD      umeclidinium  1 puff Inhalation Daily Betsy Srivastava MD      zolpidem  10 mg Oral HS PRN Betsy Srivastava MD          Today, Patient Was Seen By: Sean Foster MD    **Please Note: This note may have been constructed using a voice recognition system.**

## 2024-07-15 ENCOUNTER — APPOINTMENT (INPATIENT)
Dept: RADIOLOGY | Facility: HOSPITAL | Age: 79
DRG: 186 | End: 2024-07-15
Payer: MEDICARE

## 2024-07-15 ENCOUNTER — TELEPHONE (OUTPATIENT)
Age: 79
End: 2024-07-15

## 2024-07-15 LAB
ALBUMIN SERPL BCG-MCNC: 4 G/DL (ref 3.5–5)
ALP SERPL-CCNC: 69 U/L (ref 34–104)
ALT SERPL W P-5'-P-CCNC: 36 U/L (ref 7–52)
ANION GAP SERPL CALCULATED.3IONS-SCNC: 6 MMOL/L (ref 4–13)
ANION GAP SERPL CALCULATED.3IONS-SCNC: 8 MMOL/L (ref 4–13)
APICAL FOUR CHAMBER EJECTION FRACTION: 51 %
APPEARANCE FLD: CLEAR
AST SERPL W P-5'-P-CCNC: 26 U/L (ref 13–39)
BILIRUB SERPL-MCNC: 1.31 MG/DL (ref 0.2–1)
BSA FOR ECHO PROCEDURE: 1.74 M2
BUN SERPL-MCNC: 23 MG/DL (ref 5–25)
BUN SERPL-MCNC: 26 MG/DL (ref 5–25)
CALCIUM SERPL-MCNC: 8.8 MG/DL (ref 8.4–10.2)
CALCIUM SERPL-MCNC: 9.4 MG/DL (ref 8.4–10.2)
CHLORIDE SERPL-SCNC: 103 MMOL/L (ref 96–108)
CHLORIDE SERPL-SCNC: 99 MMOL/L (ref 96–108)
CO2 SERPL-SCNC: 22 MMOL/L (ref 21–32)
CO2 SERPL-SCNC: 24 MMOL/L (ref 21–32)
COLOR FLD: NORMAL
CREAT SERPL-MCNC: 0.9 MG/DL (ref 0.6–1.3)
CREAT SERPL-MCNC: 0.91 MG/DL (ref 0.6–1.3)
FRACTIONAL SHORTENING: 36 (ref 28–44)
GFR SERPL CREATININE-BSD FRML MDRD: 60 ML/MIN/1.73SQ M
GFR SERPL CREATININE-BSD FRML MDRD: 61 ML/MIN/1.73SQ M
GLUCOSE FLD-MCNC: 135 MG/DL
GLUCOSE SERPL-MCNC: 104 MG/DL (ref 65–140)
GLUCOSE SERPL-MCNC: 114 MG/DL (ref 65–140)
GLUCOSE SERPL-MCNC: 136 MG/DL (ref 65–140)
GLUCOSE SERPL-MCNC: 139 MG/DL (ref 65–140)
GLUCOSE SERPL-MCNC: 265 MG/DL (ref 65–140)
HISTIOCYTES NFR FLD: 68 %
INTERVENTRICULAR SEPTUM IN DIASTOLE (PARASTERNAL SHORT AXIS VIEW): 1.3 CM
INTERVENTRICULAR SEPTUM: 1.3 CM (ref 0.6–1.1)
LDH FLD L TO P-CCNC: 85 U/L
LDH SERPL-CCNC: 175 U/L (ref 140–271)
LEFT INTERNAL DIMENSION IN SYSTOLE: 2.8 CM (ref 2.1–4)
LEFT VENTRICULAR INTERNAL DIMENSION IN DIASTOLE: 4.4 CM (ref 3.5–6)
LEFT VENTRICULAR POSTERIOR WALL IN END DIASTOLE: 1.1 CM
LEFT VENTRICULAR STROKE VOLUME: 56 ML
LVSV (TEICH): 56 ML
LYMPHOCYTES NFR BLD AUTO: 18 %
MONO+MESO NFR FLD MANUAL: 3 %
NEUTS SEG NFR BLD AUTO: 11 %
PH BODY FLUID: 7.7
POTASSIUM SERPL-SCNC: 4.2 MMOL/L (ref 3.5–5.3)
POTASSIUM SERPL-SCNC: 4.3 MMOL/L (ref 3.5–5.3)
PROT FLD-MCNC: <3 G/DL
PROT SERPL-MCNC: 6.9 G/DL (ref 6.4–8.4)
RA PRESSURE ESTIMATED: 12 MMHG
RV PSP: 50 MMHG
SITE: NORMAL
SL CV LV EF: 50
SL CV PED ECHO LEFT VENTRICLE DIASTOLIC VOLUME (MOD BIPLANE) 2D: 86 ML
SL CV PED ECHO LEFT VENTRICLE SYSTOLIC VOLUME (MOD BIPLANE) 2D: 31 ML
SODIUM SERPL-SCNC: 131 MMOL/L (ref 135–147)
SODIUM SERPL-SCNC: 131 MMOL/L (ref 135–147)
TOTAL CELLS COUNTED SPEC: 100
TOTAL NUCLEATED CELL COUNT: 792 /UL
TOTAL PROTEIN FLUID: 2.2 G/DL
TR MAX PG: 38 MMHG
TR PEAK VELOCITY: 3.1 M/S
TRICUSPID ANNULAR PLANE SYSTOLIC EXCURSION: 2.1 CM
TRICUSPID VALVE PEAK REGURGITATION VELOCITY: 3.08 M/S

## 2024-07-15 PROCEDURE — 87070 CULTURE OTHR SPECIMN AEROBIC: CPT | Performed by: INTERNAL MEDICINE

## 2024-07-15 PROCEDURE — 87205 SMEAR GRAM STAIN: CPT | Performed by: INTERNAL MEDICINE

## 2024-07-15 PROCEDURE — 88305 TISSUE EXAM BY PATHOLOGIST: CPT | Performed by: PATHOLOGY

## 2024-07-15 PROCEDURE — 99232 SBSQ HOSP IP/OBS MODERATE 35: CPT | Performed by: INTERNAL MEDICINE

## 2024-07-15 PROCEDURE — 89051 BODY FLUID CELL COUNT: CPT | Performed by: INTERNAL MEDICINE

## 2024-07-15 PROCEDURE — 97116 GAIT TRAINING THERAPY: CPT

## 2024-07-15 PROCEDURE — 83986 ASSAY PH BODY FLUID NOS: CPT | Performed by: INTERNAL MEDICINE

## 2024-07-15 PROCEDURE — 93005 ELECTROCARDIOGRAM TRACING: CPT

## 2024-07-15 PROCEDURE — 71045 X-RAY EXAM CHEST 1 VIEW: CPT

## 2024-07-15 PROCEDURE — 32554 ASPIRATE PLEURA W/O IMAGING: CPT | Performed by: INTERNAL MEDICINE

## 2024-07-15 PROCEDURE — 82945 GLUCOSE OTHER FLUID: CPT | Performed by: INTERNAL MEDICINE

## 2024-07-15 PROCEDURE — 82948 REAGENT STRIP/BLOOD GLUCOSE: CPT

## 2024-07-15 PROCEDURE — NC001 PR NO CHARGE: Performed by: INTERNAL MEDICINE

## 2024-07-15 PROCEDURE — 94760 N-INVAS EAR/PLS OXIMETRY 1: CPT

## 2024-07-15 PROCEDURE — 88112 CYTOPATH CELL ENHANCE TECH: CPT | Performed by: PATHOLOGY

## 2024-07-15 PROCEDURE — 97163 PT EVAL HIGH COMPLEX 45 MIN: CPT

## 2024-07-15 PROCEDURE — 88341 IMHCHEM/IMCYTCHM EA ADD ANTB: CPT | Performed by: PATHOLOGY

## 2024-07-15 PROCEDURE — 88342 IMHCHEM/IMCYTCHM 1ST ANTB: CPT | Performed by: PATHOLOGY

## 2024-07-15 PROCEDURE — 80053 COMPREHEN METABOLIC PANEL: CPT | Performed by: INTERNAL MEDICINE

## 2024-07-15 PROCEDURE — 80048 BASIC METABOLIC PNL TOTAL CA: CPT

## 2024-07-15 PROCEDURE — 83615 LACTATE (LD) (LDH) ENZYME: CPT | Performed by: INTERNAL MEDICINE

## 2024-07-15 PROCEDURE — 84157 ASSAY OF PROTEIN OTHER: CPT | Performed by: INTERNAL MEDICINE

## 2024-07-15 PROCEDURE — 0W993ZZ DRAINAGE OF RIGHT PLEURAL CAVITY, PERCUTANEOUS APPROACH: ICD-10-PCS | Performed by: INTERNAL MEDICINE

## 2024-07-15 PROCEDURE — 94640 AIRWAY INHALATION TREATMENT: CPT

## 2024-07-15 RX ORDER — FUROSEMIDE 20 MG/1
20 TABLET ORAL DAILY
Status: DISCONTINUED | OUTPATIENT
Start: 2024-07-15 | End: 2024-07-16

## 2024-07-15 RX ADMIN — FLECAINIDE ACETATE 150 MG: 50 TABLET ORAL at 12:09

## 2024-07-15 RX ADMIN — OXYCODONE HYDROCHLORIDE AND ACETAMINOPHEN 500 MG: 500 TABLET ORAL at 12:04

## 2024-07-15 RX ADMIN — DILTIAZEM HYDROCHLORIDE 120 MG: 120 CAPSULE, COATED, EXTENDED RELEASE ORAL at 12:09

## 2024-07-15 RX ADMIN — FAMOTIDINE 10 MG: 20 TABLET ORAL at 12:09

## 2024-07-15 RX ADMIN — GABAPENTIN 300 MG: 300 CAPSULE ORAL at 21:19

## 2024-07-15 RX ADMIN — ZOLPIDEM TARTRATE 10 MG: 5 TABLET, COATED ORAL at 21:22

## 2024-07-15 RX ADMIN — GUAIFENESIN 600 MG: 600 TABLET ORAL at 21:19

## 2024-07-15 RX ADMIN — METOPROLOL SUCCINATE 100 MG: 100 TABLET, EXTENDED RELEASE ORAL at 21:19

## 2024-07-15 RX ADMIN — FLECAINIDE ACETATE 150 MG: 50 TABLET ORAL at 21:19

## 2024-07-15 RX ADMIN — GUAIFENESIN 600 MG: 600 TABLET ORAL at 12:08

## 2024-07-15 RX ADMIN — LEVALBUTEROL HYDROCHLORIDE 1.25 MG: 1.25 SOLUTION RESPIRATORY (INHALATION) at 07:47

## 2024-07-15 RX ADMIN — ROPINIROLE HYDROCHLORIDE 2 MG: 1 TABLET, FILM COATED ORAL at 21:19

## 2024-07-15 RX ADMIN — LEVALBUTEROL HYDROCHLORIDE 1.25 MG: 1.25 SOLUTION RESPIRATORY (INHALATION) at 21:58

## 2024-07-15 RX ADMIN — EZETIMIBE 10 MG: 10 TABLET ORAL at 21:19

## 2024-07-15 RX ADMIN — APIXABAN 5 MG: 5 TABLET, FILM COATED ORAL at 17:09

## 2024-07-15 RX ADMIN — FUROSEMIDE 20 MG: 20 TABLET ORAL at 13:52

## 2024-07-15 RX ADMIN — APIXABAN 5 MG: 5 TABLET, FILM COATED ORAL at 12:04

## 2024-07-15 RX ADMIN — INSULIN LISPRO 3 UNITS: 100 INJECTION, SOLUTION INTRAVENOUS; SUBCUTANEOUS at 17:09

## 2024-07-15 RX ADMIN — UMECLIDINIUM 1 PUFF: 62.5 AEROSOL, POWDER ORAL at 08:54

## 2024-07-15 RX ADMIN — LEVALBUTEROL HYDROCHLORIDE 1.25 MG: 1.25 SOLUTION RESPIRATORY (INHALATION) at 14:00

## 2024-07-15 RX ADMIN — METOPROLOL SUCCINATE 100 MG: 100 TABLET, EXTENDED RELEASE ORAL at 12:08

## 2024-07-15 NOTE — PROGRESS NOTES
General Cardiology   Progress Note -  [facility]   Farnaz Martin 78 y.o. female MRN: [id number]  Unit/Bed#: S -01 Encounter: [id number]    Assessment     Syncope vs fall  Hs troponin negative  ECG AV paced  Device interrogation- 3.9% Afib burden. Device functioning appropriately. Lower ventricular rate set to 60 bpm.  Confused at time of fall and after waking up  Low BPs in the mornings recently, SBP in the 90s. PCP recently stopped PM dose of diltiazem. BP stable here     Paroxysmal atrial fibrillation/flutter  Hx of atrial fibrillation s/p ablation x 2, atrial flutter ablation as well  History of atrial tachycardia on device interrogations  PTA-Tambocor 150 mg twice daily, Toprol  mg twice daily and diltiazem 120 mg daily  History of QTc prolongation and torsades with sotalol  Anticoagulated on Eliquis 5 mg twice daily     3.  Large right pleural effusion  Worsening effusion despite overall stable volume status  S/p bedside thoracentesis with 700 mL yellow fluid removed. Cytology pending.     4.  Chronic diastolic CHF  Home diuretic: lasix 20 mg daily  Weight: 161 lb  Dry weight: 156 lb  S/p IVF from ED due to KIMBERLEY with improved creatinine  Not significantly overloaded on exam     5.  HTN-stable, 24-hour average /75 on Toprol- mg every 12 hours, diltiazem 120 mg daily.      6.  HLD-on Zetia     7.  Chronic hyponatremia-improving, sodium 131 today, from 128 on admission     8.  Valvular heart disease- Moderate to severe MR- LVEF 55%, normal wall motion, normal RV size/function. Moderate to severe MR on echo from May 2024. Updated echo pending.     9.  Bronchiectasis- chronic cough, sees pulmonology    10. Left renal mass- outpatient follow up     Plan  Follow up fluid studies from thoracentesis  Follow up official echo read  Resume Lasix 20 mg PO daily  Continue Toprol XL, flecainide, diltiazem, Eliquis   Ok to d/c telemetry    Subjective  Review of Systems   Constitutional: Negative for  "chills, malaise/fatigue and weight gain.   Cardiovascular:  Negative for chest pain, dyspnea on exertion, leg swelling, orthopnea, palpitations and syncope.   Respiratory:  Negative for cough, shortness of breath, sleep disturbances due to breathing and sputum production.    Endocrine: Negative.    Hematologic/Lymphatic: Negative.    Gastrointestinal:  Negative for bloating and nausea.   Genitourinary: Negative.    Neurological:  Negative for dizziness, light-headedness and weakness.   Psychiatric/Behavioral:  Negative for altered mental status.    All other systems reviewed and are negative.      Objective:   Vitals: Blood pressure 145/79, pulse 95, temperature 97.9 °F (36.6 °C), resp. rate 17, height 5' 2\" (1.575 m), weight 73.3 kg (161 lb 9.6 oz), last menstrual period 1990, SpO2 97%, not currently breastfeeding., Body mass index is 29.56 kg/m².,     Systolic (24hrs), Av , Min:99 , Max:145     Diastolic (24hrs), Av, Min:61, Max:85      No intake or output data in the 24 hours ending 07/15/24 1032  Wt Readings from Last 3 Encounters:   07/15/24 73.3 kg (161 lb 9.6 oz)   24 73.7 kg (162 lb 8 oz)   24 69.9 kg (154 lb)     Telemetry Review: NSR with LBBB, intermittent pacing    Physical Exam  Vitals reviewed.   Constitutional:       General: She is not in acute distress.  Neck:      Vascular: No hepatojugular reflux or JVD.   Cardiovascular:      Rate and Rhythm: Normal rate. Rhythm irregular.      Pulses: Normal pulses.      Heart sounds: Normal heart sounds. No murmur heard.     No friction rub. No gallop.   Pulmonary:      Effort: Pulmonary effort is normal. No respiratory distress.      Breath sounds: No rales.      Comments: LLL crackles after thoracentesis  Room air  Abdominal:      General: Bowel sounds are normal. There is no distension.      Palpations: Abdomen is soft.      Tenderness: There is no abdominal tenderness.   Musculoskeletal:         General: No tenderness. Normal " range of motion.      Cervical back: Neck supple.      Right lower leg: No edema.      Left lower leg: No edema.   Skin:     General: Skin is warm and dry.      Capillary Refill: Capillary refill takes less than 2 seconds.      Findings: No erythema.   Neurological:      Mental Status: She is alert and oriented to person, place, and time.   Psychiatric:         Mood and Affect: Mood normal.         LABORATORY RESULTS  Results from last 7 days   Lab Units 07/13/24  0625   CK TOTAL U/L 68     CBC with diff:   Results from last 7 days   Lab Units 07/13/24 0628 07/13/24  0625   WBC Thousand/uL  --  7.98   HEMOGLOBIN g/dL  --  13.1   I STAT HEMOGLOBIN g/dl 13.3  --    HEMATOCRIT %  --  40.0   HEMATOCRIT, ISTAT % 39  --    MCV fL  --  92   PLATELETS Thousands/uL  --  281   RBC Million/uL  --  4.36   MCH pg  --  30.0   MCHC g/dL  --  32.8   RDW %  --  13.9   MPV fL  --  9.9   NRBC AUTO /100 WBCs  --  0       CMP:  Results from last 7 days   Lab Units 07/15/24  0509 07/14/24  0501 07/13/24 0628 07/13/24  0625   POTASSIUM mmol/L 4.3 4.2  --  4.6   CHLORIDE mmol/L 103 101  --  95*   CO2 mmol/L 22 23  --  23   CO2, I-STAT mmol/L  --   --  26  --    BUN mg/dL 26* 31*  --  41*   CREATININE mg/dL 0.90 1.08  --  1.34*   GLUCOSE, ISTAT mg/dl  --   --  148*  --    CALCIUM mg/dL 8.8 8.8  --  9.5   EGFR ml/min/1.73sq m 61 48  --  37     BMP:  Results from last 7 days   Lab Units 07/15/24  0509 07/14/24  0501 07/13/24 0628 07/13/24  0625   POTASSIUM mmol/L 4.3 4.2  --  4.6   CHLORIDE mmol/L 103 101  --  95*   CO2 mmol/L 22 23  --  23   CO2, I-STAT mmol/L  --   --  26  --    BUN mg/dL 26* 31*  --  41*   CREATININE mg/dL 0.90 1.08  --  1.34*   GLUCOSE, ISTAT mg/dl  --   --  148*  --    CALCIUM mg/dL 8.8 8.8  --  9.5     Lab Results   Component Value Date    CREATININE 0.90 07/15/2024    CREATININE 1.08 07/14/2024    CREATININE 1.34 (H) 07/13/2024     Lab Results   Component Value Date    NTBNP 761 (H) 01/08/2021    NTBNP 2,773 (H)  2021    NTBNP 506 (H) 2019           Results from last 7 days   Lab Units 24  0501   MAGNESIUM mg/dL 2.1          Results from last 7 days   Lab Units 24  0625   HEMOGLOBIN A1C % 8.8*                    Lipid Profile:   Lab Results   Component Value Date    CHOL 205 2015    CHOL 195 07/10/2014     Lab Results   Component Value Date    HDL 46 (L) 2024    HDL 53 2023    HDL 66 2021     Lab Results   Component Value Date    LDLCALC 64 2024    LDLCALC 96 2023    LDLCALC 88 2021     Lab Results   Component Value Date    TRIG 63 2024    TRIG 68 2023    TRIG 112 2021       Cardiac testing:   Results for orders placed during the hospital encounter of 21    Echo complete with contrast if indicated    [address] [id number] [phone]    Transthoracic Echocardiogram  2D, M-mode, Doppler, and Color Doppler    Study date:  2021    Patient: TANISHA LEVINE  MR number: [id number]  Account number: [id number]  : 1945  Age: 75 years  Gender: Female  Status: Inpatient  Location: Bedside  Height: 62 in  Weight: 151.6 lb  BP: 126/ 94 mmHg    Indications: Atrial fibrillation    Diagnoses: I48.0 - Atrial fibrillation    Sonographer:  IGNACIO Cameron  Primary Physician:  Naveen Monsivais MD  Referring Physician:  Shantal Huff MD  Group:  [facility]  Interpreting Physician:  Kwabena Seymour MD    SUMMARY    LEFT VENTRICLE:  Systolic function was normal. Ejection fraction was estimated to be 55 %.  There were no regional wall motion abnormalities.  Wall thickness was at the upper limits of normal.  Doppler parameters were consistent with elevated ventricular end-diastolic filling pressure.    LEFT ATRIUM:  The atrium was mildly to moderately dilated.    RIGHT ATRIUM:  The atrium was mildly dilated.    MITRAL VALVE:  There was mild  stenosis.    TRICUSPID VALVE:  There was mild to moderate regurgitation.  Pulmonary artery systolic pressure was mildly increased.    HISTORY: PRIOR HISTORY: HLD, HTN, PAF, chronic CHF, thyroid nodule    PROCEDURE: The procedure was performed at the bedside. This was a routine study. The transthoracic approach was used. The study included complete 2D imaging, M-mode, complete spectral Doppler, and color Doppler. The heart rate was 101 bpm,  at the start of the study. Images were obtained from the parasternal, apical, subcostal, and suprasternal notch acoustic windows. Echocardiographic views were limited due to lung interference. This was a technically difficult study.    LEFT VENTRICLE: Size was normal. Systolic function was normal. Ejection fraction was estimated to be 55 %. There were no regional wall motion abnormalities. Wall thickness was at the upper limits of normal. DOPPLER: Transmitral flow  pattern: atrial fibrillation. Left ventricular diastolic function parameters were abnormal. Doppler parameters were consistent with elevated ventricular end-diastolic filling pressure.    RIGHT VENTRICLE: The size was normal. Systolic function was normal. Wall thickness was normal.    LEFT ATRIUM: The atrium was mildly to moderately dilated.    RIGHT ATRIUM: The atrium was mildly dilated.    MITRAL VALVE: Valve structure was normal. There was normal leaflet separation. DOPPLER: The transmitral velocity was within the normal range. There was mild stenosis. There was no significant regurgitation.    AORTIC VALVE: The valve was trileaflet. Leaflets exhibited normal thickness and normal cuspal separation. DOPPLER: Transaortic velocity was within the normal range. There was no evidence for stenosis. There was no significant  regurgitation.    TRICUSPID VALVE: The valve structure was normal. There was normal leaflet separation. DOPPLER: The transtricuspid velocity was within the normal range. There was no evidence for  stenosis. There was mild to moderate regurgitation. Pulmonary  artery systolic pressure was mildly increased.    PULMONIC VALVE: Leaflets exhibited normal thickness, no calcification, and normal cuspal separation. DOPPLER: The transpulmonic velocity was within the normal range. There was no significant regurgitation.    PERICARDIUM: There was no pericardial effusion. The pericardium was normal in appearance.    AORTA: The root exhibited normal size.    SYSTEMIC VEINS: IVC: The inferior vena cava was normal in size.    PULMONARY VEINS: DOPPLER: Doppler signals were not recordable in the pulmonary vein(s).    SYSTEM MEASUREMENT TABLES    2D  %FS: 37.15 %  Ao Diam: 2.74 cm  Ao asc: 2.72 cm  EDV(Teich): 103.17 ml  EF(Teich): 67.06 %  ESV(Teich): 33.98 ml  IVSd: 0.94 cm  LA Area: 13.2 cm2  LA Diam: 3.98 cm  LVEDV MOD A4C: 32.01 ml  LVEF MOD A4C: 63.74 %  LVESV MOD A4C: 11.61 ml  LVIDd: 4.72 cm  LVIDs: 2.96 cm  LVLd A4C: 5.9 cm  LVLs A4C: 4.79 cm  LVPWd: 0.93 cm  RA Area: 8.67 cm2  RVIDd: 3.35 cm  SV MOD A4C: 20.4 ml  SV(Teich): 69.19 ml    CW  TR MaxP.39 mmHg  TR Vmax: 2.89 m/s    MM  TAPSE: 1.51 cm    [facility] Accredited Echocardiography Laboratory    Prepared and electronically signed by    Kwabena Seymour MD  Signed 2021 11:05:34    No results found for this or any previous visit.    No results found for this or any previous visit.    No valid procedures specified.  Results for orders placed during the hospital encounter of 10/05/22    NM myocardial perfusion spect (stress and/or rest)    Interpretation Summary    Resting ECG: ECG is abnormal. Resting ECG shows no ST-segment deviation. Patient a paced and V sensed.    Perfusion Defect Conclusion: The stress/rest perfusion ratio is 1.04 . There is no evidence of transient ischemic dilation (TID).    Perfusion: There is a left ventricular perfusion defect that is small in size with mild reduction in uptake present in the mid to apical  anterior and anteroseptal location(s) that is predominantly fixed. The defect appears to be an artifact caused by breast attenuation.    Stress Function: Left ventricular function post-stress is normal. Post-stress ejection fraction is 70 %.    Negative exercise pharmacological stress test    Meds/Allergies   all current active meds have been reviewed and current meds:   Current Facility-Administered Medications   Medication Dose Route Frequency    acetaminophen (TYLENOL) tablet 650 mg  650 mg Oral Q8H PRN    albuterol (PROVENTIL HFA,VENTOLIN HFA) inhaler 2 puff  2 puff Inhalation Q6H PRN    apixaban (ELIQUIS) tablet 5 mg  5 mg Oral BID    ascorbic acid (VITAMIN C) tablet 500 mg  500 mg Oral Daily    benzonatate (TESSALON PERLES) capsule 100 mg  100 mg Oral TID PRN    diltiazem (CARDIZEM CD) 24 hr capsule 120 mg  120 mg Oral Daily    ezetimibe (ZETIA) tablet 10 mg  10 mg Oral HS    famotidine (PEPCID) tablet 10 mg  10 mg Oral Daily    flecainide (TAMBOCOR) tablet 150 mg  150 mg Oral Q12H JOAQUINA    gabapentin (NEURONTIN) capsule 300 mg  300 mg Oral HS    guaiFENesin (MUCINEX) 12 hr tablet 600 mg  600 mg Oral Q12H JOAQUINA    insulin lispro (HumALOG/ADMELOG) 100 units/mL subcutaneous injection 1-6 Units  1-6 Units Subcutaneous TID AC    levalbuterol (XOPENEX) inhalation solution 1.25 mg  1.25 mg Nebulization TID    metoprolol succinate (TOPROL-XL) 24 hr tablet 100 mg  100 mg Oral Q12H    rOPINIRole (REQUIP) tablet 2 mg  2 mg Oral HS    umeclidinium 62.5 mcg/actuation inhaler AEPB 1 puff  1 puff Inhalation Daily    zolpidem (AMBIEN) tablet 10 mg  10 mg Oral HS PRN       Medications Prior to Admission:     acetylcysteine (MUCOMYST) 10 % nebulizer solution    acetylcysteine (MUCOMYST) 200 mg/mL nebulizer solution    albuterol (ProAir HFA) 90 mcg/act inhaler    apixaban (ELIQUIS) 5 mg    ascorbic Acid (VITAMIN C) 500 MG CPCR    benzonatate (TESSALON PERLES) 100 mg capsule    Cetirizine HCl (ZyrTEC Allergy) 10 MG CAPS     Cholecalciferol 50 MCG (2000 UT) CAPS    Chromium Picolinate (CHROMIUM PICOLATE PO)    dapagliflozin 5 MG TABS    diltiazem (CARDIZEM CD) 120 mg 24 hr capsule    ezetimibe (ZETIA) 10 mg tablet    famotidine (PEPCID) 20 mg tablet    flecainide (TAMBOCOR) 100 mg tablet    furosemide (LASIX) 20 mg tablet    gabapentin (NEURONTIN) 300 mg capsule    gabapentin (NEURONTIN) 300 mg capsule    glucose blood (OneTouch Verio) test strip    Insulin Glargine Solostar (Lantus SoloStar) 100 UNIT/ML SOPN    Insulin Pen Needle 31G X 4 MM MISC    ipratropium (ATROVENT) 0.03 % nasal spray    metoprolol succinate (TOPROL-XL) 100 mg 24 hr tablet    OneTouch Delica Lancets 33G MISC    predniSONE 10 mg tablet    rOPINIRole (REQUIP) 1 mg tablet    sodium chloride 3 % inhalation solution    tiotropium (Spiriva Respimat) 2.5 MCG/ACT AERS inhaler    TURMERIC PO    zolpidem (AMBIEN) 10 mg tablet    Counseling / Coordination of Care  Total floor / unit time spent today 20 minutes.  Greater than 50% of total time was spent with the patient and / or family counseling and / or coordination of care.      ** Please Note: Dragon 360 Dictation voice to text software may have been used in the creation of this document. **

## 2024-07-15 NOTE — TELEPHONE ENCOUNTER
Patient's daughter calling to let office know patient is currently hospitalized at [facility].  Patient fell, and it is believed the fall was due to her pulmonary condition.  She has fluid build up on outside of lungs pushing on heart.  Fluid will be drained today.

## 2024-07-15 NOTE — PLAN OF CARE
Problem: PHYSICAL THERAPY ADULT  Goal: Performs mobility at highest level of function for planned discharge setting.  See evaluation for individualized goals.  Description: Treatment/Interventions: ADL retraining, Functional transfer training, LE strengthening/ROM, Elevations, Therapeutic exercise, Endurance training, Patient/family training, Equipment eval/education, Bed mobility, Gait training, Compensatory technique education, Spoke to nursing, Spoke to case management          See flowsheet documentation for full assessment, interventions and recommendations.  Note:    Problem List: Decreased strength, Decreased endurance, Impaired balance, Decreased mobility, Decreased coordination, Decreased safety awareness, Pain  Assessment: Patient seen for Physical Therapy evaluation. Patient admitted with Syncope and collapse.  Comorbidities affecting patient's physical performance include: HTN, HLD, CHF, P A-fib, SSS, PPM, DM 2, OA, fibromyalgia, peripheral neuropathy, RLS meningioma, lumbar spondylosis/stenosis.  Personal factors affecting patient at time of initial evaluation include: lives in one story house, ambulating with assistive device, stairs to enter home, inability to navigate community distances, inability to navigate level surfaces without external assistance, inability to perform dynamic tasks in community, limited home support, and positive fall history. Prior to admission, patient was independent with functional mobility with RW, independent with functional mobility without assistive device, independent with ADLS, requiring assist for IADLS, living alone in one story home with 2 steps to enter, ambulating household distance, and ambulating community distances.  Please find objective findings from Physical Therapy assessment regarding body systems outlined above with impairments and limitations including weakness, impaired balance, decreased endurance, impaired coordination, gait deviations, pain,  decreased activity tolerance, decreased functional mobility tolerance, decreased safety awareness, and fall risk.  The Barthel Index was used as a functional outcome tool presenting with a score of Barthel Index Score: 55 today indicating marked limitations of functional mobility and ADLS.  Patient's clinical presentation is currently unstable/unpredictable as seen in patient's presentation of changing level of pain, increased fall risk, new onset of impairment of functional mobility, decreased endurance, and new onset of weakness. Pt would benefit from continued Physical Therapy treatment to address deficits as defined above and maximize level of functional mobility. As demonstrated by objective findings, the assigned level of complexity for this evaluation is high.The patient's -[facility] Basic Mobility Inpatient Short Form Raw Score is 17. A Raw score of greater than 16 suggests the patient may benefit from discharge to home. Please also refer to the recommendation of the Physical Therapist for safe discharge planning.        Rehab Resource Intensity Level, PT: III (Minimum Resource Intensity)    See flowsheet documentation for full assessment.

## 2024-07-15 NOTE — ASSESSMENT & PLAN NOTE
Wt Readings from Last 3 Encounters:   07/15/24 73.3 kg (161 lb 9.6 oz)   07/11/24 73.7 kg (162 lb 8 oz)   06/17/24 69.9 kg (154 lb)     ECHO: 7/14  EF 50%. Systolic function is mildly reduced. There is mild global hypokinesis.   Right ventricular volume overload  Moderately elevated right ventricular systolic pressure: 50.00 mmHg.     ECHO 5/25/2024  EF 55%; Moderate to severe Mitral regurgitation  CT: Mild global cardiomegaly. Dilated hepatic IVC and hepatic veins suggesting possible right heart regurgitation- concerning for RV failure.  Patient appears Euvolemic on admission.    PLAN  Daily standing weight, I/O's.  Lasix 20 mg  Possible Thoracocentesis today

## 2024-07-15 NOTE — PROGRESS NOTES
Progress Note - Pulmonary   Farnaz Martin 78 y.o. female MRN: [id number]  Unit/Bed#: S -01 Encounter: [id number]    Assessment/Plan:    Acute hypoxic respiratory failure-improved  -97% on room air, patient does not wear any supplemental O2 at home  -Continue to maintain saturation greater than 89%  -Pulmonary hygiene encouraged: Deep breathing with cough, OOB as tolerated, incentive spirometry and flutter valve    Right pleural effusion  -Parapneumonic versus CHF  -CT chest abdomen pelvis 07/13/2024 showed new large dependent right pleural effusion with associated atelectatic changes and small left effusion  -WBC 7.98  -TTE completed official read pending  -Thoracentesis 07/15/2024 700 mL clearly yellow fluid drained-Labs sent    Chronic diastolic congestive heart failure w/ PHT likely WHO group II & III  -TTE completed 07/14/2024 official read pending  -Echo from May 2024 showed EF 55% normal wall motion, normal RV size and/function moderate to severe MR, RVSP 66.00 mmHg  -IV fluids given on admission due to acute kidney injury  -On Lasix 20 mg at home, will resume when kidney function is  -Continue I's and O's and daily weights  -BNP ordered    Chronic cough secondary to bronchiectasis and atelectasis  -Home medication regime Spiriva 2.5 mcg/ACT, Mucomyst nebulized solution every 4 hours as needed hypertonic saline nebulized solution, and albuterol 90 mcg/ACT 2 puffs every 6 hours as needed, Zyrtec 10 mg once daily, Atrovent nasal spray 2 sprays twice daily  -Discontinue Mucomyst due to nausea  -Will continue to monitor off of antibiotics and steroids at this time  -Continue supportive care with Mucinex, Tessalon Perles    Chief Complaint:    I feel ok    Subjective:    Patient seen and examined at bedside.  Found sitting up comfortably in a chair.  Denies any overnight events.  No fever chills night sweats chest pain or hemoptysis present.  Discussed thoracentesis process with  "patient.    Objective:    Vitals: Blood pressure 145/79, pulse 95, temperature 97.9 °F (36.6 °C), resp. rate 17, height 5' 2\" (1.575 m), weight 73.3 kg (161 lb 9.6 oz), last menstrual period 02/01/1990, SpO2 97%, not currently breastfeeding.RA,Body mass index is 29.56 kg/m².    No intake or output data in the 24 hours ending 07/15/24 0931    Invasive Devices       Peripheral Intravenous Line  Duration             Peripheral IV 07/13/24 Right Antecubital 2 days                    Physical Exam:     Physical Exam  Vitals reviewed.   Constitutional:       General: She is not in acute distress.     Appearance: She is not ill-appearing.   HENT:      Head: Normocephalic and atraumatic.      Right Ear: External ear normal.      Left Ear: External ear normal.      Nose: Nose normal.      Mouth/Throat:      Mouth: Mucous membranes are moist.      Pharynx: Oropharynx is clear.   Eyes:      Extraocular Movements: Extraocular movements intact.      Pupils: Pupils are equal, round, and reactive to light.   Cardiovascular:      Rate and Rhythm: Normal rate. Rhythm irregular.      Pulses: Normal pulses.      Heart sounds: Normal heart sounds.   Pulmonary:      Effort: Pulmonary effort is normal.      Breath sounds: No wheezing or rhonchi.      Comments: Diminished breath sounds on right base  Abdominal:      General: Bowel sounds are normal.      Tenderness: There is no abdominal tenderness.   Musculoskeletal:         General: Normal range of motion.      Cervical back: Neck supple.      Right lower leg: No edema.      Left lower leg: No edema.   Skin:     General: Skin is warm.      Findings: Bruising present.      Comments: Right flank bruising noted   Neurological:      Mental Status: She is alert and oriented to person, place, and time.   Psychiatric:         Mood and Affect: Mood normal.         Behavior: Behavior normal.         Thought Content: Thought content normal.         Judgment: Judgment normal.         Labs: I have " "personally reviewed pertinent lab results., ABG: No results found for: \"PHART\", \"XXE4DUN\", \"PO2ART\", \"PLK9KFO\", \"P2ZOQUWH\", \"BEART\", \"SOURCE\", BNP: No results found for: \"BNP\", CBC: No results found for: \"WBC\", \"HGB\", \"HCT\", \"MCV\", \"PLT\", \"ADJUSTEDWBC\", \"RBC\", \"MCH\", \"MCHC\", \"RDW\", \"MPV\", \"NRBC\", CMP:   Lab Results   Component Value Date    SODIUM 131 (L) 07/15/2024    K 4.3 07/15/2024     07/15/2024    CO2 22 07/15/2024    BUN 26 (H) 07/15/2024    CREATININE 0.90 07/15/2024    CALCIUM 8.8 07/15/2024    EGFR 61 07/15/2024       Imaging and other studies: I have personally reviewed pertinent reports.    CT chest abdomen pelvis 07/13/2024  No acute traumatic abnormality identified.     New large dependent right pleural effusion with associated atelectatic changes. Small left effusion.     Global cardiomegaly, right greater than left. Dilated hepatic veins and IVC suggesting right heart failure/regurgitation.     New ascites. New gallbladder wall thickening. New presacral edema. Correlate with clinical findings for third spacing of fluid.     Stable appearance of cystic lesion in the pancreatic head.     Stable appearance of solid enhancing left renal cortical mass.  "

## 2024-07-15 NOTE — PROCEDURES
Thoracentesis (Bedside)    Date/Time: 7/15/2024 10:50 AM    Performed by: Thu Belle MD  Authorized by: Thu Belle MD    Patient location:  Bedside  Consent:     Consent obtained:  Written    Consent given by:  Patient    Risks discussed:  Bleeding, infection, pain, pneumothorax, nerve damage and incomplete drainage    Alternatives discussed:  No treatment  Universal protocol:     Procedure explained and questions answered to patient or proxy's satisfaction: yes      Relevant documents present and verified: yes      Test results available and properly labeled: yes      Radiology Images displayed and confirmed.  If images not available, report reviewed: yes      Required blood products, implants, devices and special equipment available: yes      Site/side marked: yes      Patient identity confirmed:  Verbally with patient  Indications:     Procedure Purpose: diagnostic and therapeutic      Indications: pleural effusion    Anesthesia (see MAR for exact dosages):     Anesthesia method:  Local infiltration    Local anesthetic:  Lidocaine 1% w/o epi  Procedure details:     Preparation: Patient was prepped and draped in usual sterile fashion      Standard thoracentesis cath kit used: Yes      Patient position:  Sitting    Laterality:  Right    Location:  Midaxillary line    Intercostal space:  5th    Ultrasound guidance: no      Indwelling catheter placed: no      Number of attempts:  1    Drainage color:  Yellow    Fluid removed amount:  700mL  Post-procedure details:     Chest x-ray performed: yes      Chest x-ray findings:  Pleural effusion improved    Patient tolerance of procedure:  Tolerated well, no immediate complications  Comments:      Supervised by Dr. Huerta who was present and assisted throughout the entire procedure.

## 2024-07-15 NOTE — ASSESSMENT & PLAN NOTE
Sodium   Date Value Ref Range Status   07/15/2024 131 (L) 135 - 147 mmol/L Final   08/01/2023 130 (L) 135 - 145 mmol/L Final   05/06/2015 136 136 - 145 mmol/L Final     SODIUM, I-STAT   Date Value Ref Range Status   07/13/2024 129 (L) 136 - 145 mmol/l Final     Improved with IV saline   Per patient daughter; has poor oral intake    PLAN  Encourage p.o. intake.  Consider Urine sodium; Urine Osmolality and serum osmolality

## 2024-07-15 NOTE — PHYSICAL THERAPY NOTE
PHYSICAL THERAPY EVALUATION/TREATMENT    NAME:  Farnaz Martin  AGE:   78 y.o.  Mrn:   [id number]  Length Of Stay: 1    ADMIT DX:  Unspecified multiple injuries, initial encounter [T07.XXXA]    Past Medical History:   Diagnosis Date    Actinic keratosis     last assessed - 06Jun2014    Arthritis     Atrial fibrillation with rapid ventricular response (HCC)     last assessed - 26Apr2016    Atrial fibrillation with rapid ventricular response (HCC) 04/19/2016    Basal cell carcinoma     Benign colon polyp     last assessed - 27Apr2015    Bronchiectasis without complication (HCC)     CHF (congestive heart failure) (HCC) 06/2022    Chronic diastolic CHF (congestive heart failure) (HCC)     Disease of thyroid gland     Effusion of knee joint right     Resolved - 19Apr2016    Esophageal reflux     Fibromyalgia     last assessed - [id number]    Fibromyalgia, primary     GERD (gastroesophageal reflux disease)     Hyperlipidemia     Hypertension     Hyponatremia     Malignant neoplasm of thyroid gland (HCC)     Meningioma (HCC)     MGUS (monoclonal gammopathy of unknown significance)     Palpitations     last assessed - 30Apr2013    Peroneal tendonitis, right     last assessed - 02Oct2013    Right lumbar radiculopathy 03/17/2016    Thyroid cancer (HCC)     Thyroid nodule     Trochanteric bursitis of left hip 03/09/2018     Past Surgical History:   Procedure Laterality Date    CARDIAC ELECTROPHYSIOLOGY STUDY AND ABLATION  08/2022    CATARACT EXTRACTION Bilateral     COLONOSCOPY  03/2018    COLONOSCOPY W/ POLYPECTOMY  2021    repeat in 5 years    EYE SURGERY      OOPHORECTOMY      [place] NEUROPLASTY &/TRANSPOS MEDIAN NRV CARPAL TUNNE Right 11/14/2019    Procedure: RELEASE CARPAL TUNNEL;  Surgeon: Onesimo Tate MD;  Location: [facility];  Service: Orthopedics    NY TOTAL THYROID LOBECTOMY UNI W/WO ISTHMUSECTOMY Left 12/16/2020    Procedure: Left THYROID LOBECTOMY;  Surgeon: Jhony Bhatt MD;  Location: [facility];  Service:  ENT    RECTAL POLYPECTOMY      REPLACEMENT TOTAL KNEE Left     last assessed - [id number]    TONSILLECTOMY      TOTAL ABDOMINAL HYSTERECTOMY      TUBAL LIGATION      US GUIDED THYROID BIOPSY  10/14/2020      07/15/24 0900   PT Last Visit   PT Visit Date 07/15/24   Note Type   Note type Evaluation   Pain Assessment   Pain Assessment Tool 0-10   Pain Score No Pain  (At rest; 9/10 pain abdomen when coughing)   Pain Onset/Description Frequency: Intermittent   Effect of Pain on Daily Activities Limits comfort   Patient's Stated Pain Goal No pain   Hospital Pain Intervention(s) Repositioned;Ambulation/increased activity;Emotional support;Rest   Restrictions/Precautions   Other Precautions Fall Risk;Bed Alarm;Chair Alarm;Pain;Telemetry  ([place] on room air)   Home Living   Type of Home House   Home Layout One level;Able to live on main level with bedroom/bathroom;Performs ADLs on one level;Stairs to enter without rails  (2 steps to enter without rail versus ramp depending on entrance)   Bathroom Shower/Tub Walk-in shower   Bathroom Toilet Raised   Bathroom Equipment Grab bars around toilet;Grab bars in shower;Shower chair;Commode  (Patient reports she does not use the bedside commode)   Bathroom Accessibility Accessible   Home Equipment Cane;Life alert  (RW)   Prior Function   Level of [place] Independent with ADLs;Independent with functional mobility;Needs assistance with IADLS   Lives With (S)  Alone  (Patient's daughter lives next-door)   Receives Help From Family   IADLs Independent with driving;Independent with meal prep;Family/Friend/Other provides medication management   Falls in the last 6 months 1 to 4  (1, reason for admission; 3 other falls in the past 6 months)   Vocational Retired   Comments Patient normally ambulates without an assistive device prior to admission.  Occasional use of roller walker if patient feels she needs it.   General   Additional Pertinent History Patient is admitted with a fall and  "confusion.  Patient laid on the floor for several hours prior to pushing a life alert button.  Patient found to have right-sided pleural effusion, will be undergoing a thoracentesis at some point today.   Family/Caregiver Present No   Cognition   Overall Cognitive Status WFL   Arousal/Participation Cooperative   Orientation Level Oriented X4   Memory Within functional limits   Following Commands Follows multistep commands without difficulty   Comments At least 2 patient identifiers including name and date of birth   Subjective   Subjective \"Better today\"   RLE Assessment   RLE Assessment WFL  (Grossly 3+/5)   LLE Assessment   LLE Assessment WFL  (Grossly 3+/5)   Vision-Basic Assessment   Current Vision Wears glasses all the time   Light Touch   RLE Light Touch Grossly intact   LLE Light Touch Grossly intact   Bed Mobility   Supine to Sit 5  Supervision   Additional items Assist x 1;Verbal cues;Increased time required;HOB elevated;Bedrails   Transfers   Sit to Stand 5  Supervision   Additional items Assist x 1;Verbal cues;Increased time required;Armrests  (Cues for safe hand placement)   Stand to Sit 5  Supervision   Additional items Armrests;Assist x 1;Verbal cues  (Cues for safe hand placement)   Ambulation/Elevation   Gait pattern Short stride;Step through pattern;Decreased foot clearance;Decreased heel strike  (Mild, generalized unsteadiness)   Gait Assistance 4  Minimal assist   Additional items Assist x 1;Verbal cues;Tactile cues  (Progressing to CGA)   Assistive Device Rolling walker   Distance 30 feet with change in direction; SaO2 on room air remaining above 90% entire PT session   Balance   Static Sitting Fair +   Static Standing   (F-/fair with roller walker)   Ambulatory Fair -  (With roller walker)   Endurance Deficit   Endurance Deficit Yes   Activity Tolerance   Activity Tolerance Patient limited by fatigue  (Generalized weakness and deconditioning)   Nurse Made Aware COLLEEN Ling   Assessment   Problem " List Decreased strength;Decreased endurance;Impaired balance;Decreased mobility;Decreased coordination;Decreased safety awareness;Pain   Assessment Patient seen for Physical Therapy evaluation. Patient admitted with Syncope and collapse.  Comorbidities affecting patient's physical performance include: HTN, HLD, CHF, P A-fib, SSS, PPM, DM 2, OA, fibromyalgia, peripheral neuropathy, RLS meningioma, lumbar spondylosis/stenosis.  Personal factors affecting patient at time of initial evaluation include: lives in one story house, ambulating with assistive device, stairs to enter home, inability to navigate community distances, inability to navigate level surfaces without external assistance, inability to perform dynamic tasks in community, limited home support, and positive fall history. Prior to admission, patient was independent with functional mobility with RW, independent with functional mobility without assistive device, independent with ADLS, requiring assist for IADLS, living alone in one story home with 2 steps to enter, ambulating household distance, and ambulating community distances.  Please find objective findings from Physical Therapy assessment regarding body systems outlined above with impairments and limitations including weakness, impaired balance, decreased endurance, impaired coordination, gait deviations, pain, decreased activity tolerance, decreased functional mobility tolerance, decreased safety awareness, and fall risk.  The Barthel Index was used as a functional outcome tool presenting with a score of Barthel Index Score: 55 today indicating marked limitations of functional mobility and ADLS.  Patient's clinical presentation is currently unstable/unpredictable as seen in patient's presentation of changing level of pain, increased fall risk, new onset of impairment of functional mobility, decreased endurance, and new onset of weakness. Pt would benefit from continued Physical Therapy treatment to  address deficits as defined above and maximize level of functional mobility. As demonstrated by objective findings, the assigned level of complexity for this evaluation is high.    The patient's -[facility] Basic Mobility Inpatient Short Form Raw Score is 17. A Raw score of greater than 16 suggests the patient may benefit from discharge to home. Please also refer to the recommendation of the Physical Therapist for safe discharge planning.   Goals   Patient Goals To go home   STG Expiration Date 07/25/24   Short Term Goal #1 Patient will: Increase bilateral LE strength 1 grade to facilitate independent mobility, Perform all bed mobility tasks independently to improve pt's independence w/ repositioning for decrease risk of skin breakdown, Perform all transfers independently consistently from various height surfaces in order to improve I w/ engagement w/ real-world environments/situations, Ambulate at least 500+ ft.  With LRAD versus no AD independently w/o LOB to facilitate return and engagement w/ previous living environment, Navigate 2 stairs independently without handrail to either improve independence w/ entering home and/or so patient can fully access living areas in home, Increase all balance 1 grade to decrease risk for falls, Tolerate at least 15 consecutive minutes of activity to demonstrate improved activity tolerance and endurance, and Tolerate 3 hr OOB to faciliate upright tolerance   Plan   Treatment/Interventions ADL retraining;Functional transfer training;LE strengthening/ROM;Elevations;Therapeutic exercise;Endurance training;Patient/family training;Equipment eval/education;Bed mobility;Gait training;Compensatory technique education;Spoke to nursing;Spoke to case management   PT Frequency 3-5x/wk   Discharge Recommendation   Rehab Resource Intensity Level, PT III (Minimum Resource Intensity)   -PAC Basic Mobility Inpatient   Turning in Flat Bed Without Bedrails 3   Lying on Back to Sitting on Edge of Flat  Bed Without Bedrails 3   Moving Bed to Chair 3   Standing Up From Chair Using Arms 3   Walk in Room 3   Climb 3-5 Stairs With Railing 2   Basic Mobility Inpatient Raw Score 17   Basic Mobility Standardized Score 39.67   [facility] Highest Level Of Mobility   -HL Goal 5: Stand one or more mins   -HLM Achieved 7: Walk 25 feet or more   Barthel Index   Feeding 10   Bathing 0   Grooming Score 0   Dressing Score 5   Bladder Score 10   Bowels Score 10   Toilet Use Score 5   Transfers (Bed/Chair) Score 10   Mobility (Level Surface) Score 0   Stairs Score 5   Barthel Index Score 55   Additional Treatment Session   Start Time 0900   End Time 0912   Treatment Assessment Patient agreeable to additional ambulation with a roller walker.  Patient transfers sit to stand with cues for safe hand placement and supervision.  Patient ambulates with a roller walker 50 feet with change in direction with CGA.  Patient transfers stand to sit with cues for safe hand placement and supervision.  A: patient with fairly good tolerance to PT evaluation and treatment.  Patient does well ambulating with a roller walker but normally uses no assistive device at baseline.  Patient minimally fatigued with ambulation and is noted with minimal, generalized unsteadiness.  While admitted, patient will continue to benefit from skilled physical therapy services to increase her strength, balance, endurance, safe functional mobility and gait.  At this time, patient will benefit from continued use of the roller walker for gait with progression to no assistive device as able/tolerated.  When medically stable for discharge, patient is recommended for Level III Minimum Resource Intensity.   End of Consult   Patient Position at End of Consult Bed/Chair alarm activated;Bedside chair;All needs within reach   Licensure   [place] License Number  Holly [id number] LLOYD Marte   Portions of the documentation may have been created using voice recognition software. Occasional  wrong word or sound alike substitutions may have occurred due to the inherent limitation of the voice recognition software. Read the chart carefully and recognize, using context, where substitutions have occurred.

## 2024-07-15 NOTE — PLAN OF CARE
Problem: PAIN - ADULT  Goal: Verbalizes/displays adequate comfort level or baseline comfort level  Description: Interventions:  - Encourage patient to monitor pain and request assistance  - Assess pain using appropriate pain scale  - Administer analgesics based on type and severity of pain and evaluate response  - Implement non-pharmacological measures as appropriate and evaluate response  - Consider cultural and social influences on pain and pain management  - Notify physician/advanced practitioner if interventions unsuccessful or patient reports new pain  Outcome: Progressing     Problem: INFECTION - ADULT  Goal: Absence or prevention of progression during hospitalization  Description: INTERVENTIONS:  - Assess and monitor for signs and symptoms of infection  - Monitor lab/diagnostic results  - Monitor all insertion sites, i.e. indwelling lines, tubes, and drains  - Monitor endotracheal if appropriate and nasal secretions for changes in amount and color  - [place] appropriate cooling/warming therapies per order  - Administer medications as ordered  - Instruct and encourage patient and family to use good hand hygiene technique  - Identify and instruct in appropriate isolation precautions for identified infection/condition  Outcome: Progressing

## 2024-07-15 NOTE — ASSESSMENT & PLAN NOTE
Patient with history of Atrial fibrillation, with PPM  EKG absent P-waves; Wide QRS waves; Electronically paced    PLAN    Eliquis 5 mg BID  Continue flecainide 150 mg twice daily and Toprol- mg twice daily  Continue diltiazem 120 mg daily

## 2024-07-15 NOTE — ASSESSMENT & PLAN NOTE
Patient admitted for fall due to unknown cause.  Patient has multiple risk factors such as heart conditions; endorsed dehydration; history of urinary tract infections.  Patient has sick sinus with pacemaker with possible dysfunction; consider cardiac etiology  Patient appears hypovolemic on admission  CT Head unremarkable for acute cranial etiology.  Multifactorial-orthostatic hypotension as per reported by daughter vs cardiac etiology with arrhythmia, bradycardia, new onset RV failure vs vasovagal  Per cardiology consultation:  Unlikely to be cardiac related    PLAN  Orthostatic pressure- status post IV fluid.  Cardiology consult: Recommendations appreciated  Encourage PO intake  PT recommend continued patient benefit for continued physical therapy

## 2024-07-15 NOTE — ASSESSMENT & PLAN NOTE
On admission, CT CAP with contrast: New large dependent right pleural effusion with associated atelectatic changes. Small left effusion.  In the setting of bronchiectasis, pulmonary hypertension and possibly new onset RV failure.  Thoracocentesis today; follow up labs

## 2024-07-15 NOTE — PROGRESS NOTES
[facility]  Progress Note  Name: Farnaz Martin I  MRN: [id number]  Unit/Bed#: S -01 I Date of Admission: 7/13/2024   Date of Service: 7/15/2024 I Hospital Day: 1    Assessment & Plan   * Syncope and collapse  Assessment & Plan  Patient admitted for fall due to unknown cause.  Patient has multiple risk factors such as heart conditions; endorsed dehydration; history of urinary tract infections.  Patient has sick sinus with pacemaker with possible dysfunction; consider cardiac etiology  Patient appears hypovolemic on admission  CT Head unremarkable for acute cranial etiology.  Multifactorial-orthostatic hypotension as per reported by daughter vs cardiac etiology with arrhythmia, bradycardia, new onset RV failure vs vasovagal  Per cardiology consultation:  Unlikely to be cardiac related    PLAN  Orthostatic pressure- status post IV fluid.  Cardiology consult: Recommendations appreciated  Encourage PO intake  PT recommend continued patient benefit for continued physical therapy      Paroxysmal atrial fibrillation (HCC)  Assessment & Plan  Patient with history of Atrial fibrillation, with PPM  EKG absent P-waves; Wide QRS waves; Electronically paced    PLAN    Eliquis 5 mg BID  Continue flecainide 150 mg twice daily and Toprol- mg twice daily  Continue diltiazem 120 mg daily     Chronic heart failure with preserved ejection fraction (HCC)  Assessment & Plan  Wt Readings from Last 3 Encounters:   07/15/24 73.3 kg (161 lb 9.6 oz)   07/11/24 73.7 kg (162 lb 8 oz)   06/17/24 69.9 kg (154 lb)     ECHO: 7/14  EF 50%. Systolic function is mildly reduced. There is mild global hypokinesis.   Right ventricular volume overload  Moderately elevated right ventricular systolic pressure: 50.00 mmHg.     ECHO 5/25/2024  EF 55%; Moderate to severe Mitral regurgitation  CT: Mild global cardiomegaly. Dilated hepatic IVC and hepatic veins suggesting possible right heart regurgitation- concerning for  RV failure.  Patient appears Euvolemic on admission.    PLAN  Daily standing weight, I/O's.  Lasix 20 mg  Possible Thoracocentesis today    Pleural effusion, right  Assessment & Plan  On admission, CT CAP with contrast: New large dependent right pleural effusion with associated atelectatic changes. Small left effusion.  In the setting of bronchiectasis, pulmonary hypertension and possibly new onset RV failure.  Thoracocentesis today; follow up labs    Type 2 diabetes mellitus with microalbuminuria, without long-term current use of insulin ([facility])  Assessment & Plan  Lab Results   Component Value Date    HGBA1C 8.8 (H) 07/13/2024       Recent Labs     07/14/24  1605 07/14/24  2053 07/15/24  0709 07/15/24  1159   POCGLU 144* 199* 139 104     PLAN  Hold Farxiga  Insulin sliding scale  Diabetic diet  Monitor blood glucose      Hyponatremia  Assessment & Plan  Sodium   Date Value Ref Range Status   07/15/2024 131 (L) 135 - 147 mmol/L Final   08/01/2023 130 (L) 135 - 145 mmol/L Final   05/06/2015 136 136 - 145 mmol/L Final     SODIUM, I-STAT   Date Value Ref Range Status   07/13/2024 129 (L) 136 - 145 mmol/l Final     Improved with IV saline   Per patient daughter; has poor oral intake    PLAN  Encourage p.o. intake.  Consider Urine sodium; Urine Osmolality and serum osmolality    Insomnia  Assessment & Plan  Well controlled on home regimen    PLAN  Continue home medication Ambien 10mg PRN    Hyperlipidemia  Assessment & Plan  Triglyceride 63; LDL 64; HDL 46; Cholesterol 123    PLAN  Continue Ezetemibe 10mg    Essential hypertension  Assessment & Plan  Blood Pressure: 155/79     PLAN  Monitor BP.  Meds per A-fib               VTE Pharmacologic Prophylaxis: VTE Score: 5 High Risk (Score >/= 5) - Pharmacological DVT Prophylaxis Ordered: apixaban (Eliquis). Sequential Compression Devices Ordered.    Mobility:   Basic Mobility Inpatient Raw Score: 17  JH-HLM Goal: 5: Stand one or more mins  JH-HLM Achieved: 7: Walk 25 feet or  more  JH-HLM Goal achieved. Continue to encourage appropriate mobility.    Patient Centered Rounds: I performed bedside rounds with nursing staff today.   Discussions with Specialists or Other Care Team Provider: Attending Dr. Hobson    Education and Discussions with Family / Patient: Updated  (daughter) via phone.  Spoke in regards to thoracocentesis scheduled for today and patient current clinical status.  Explained that currently her fall does not appear to be cardiac related or due to stroke.  Informed that I will reach out to her again tomorrow in regards to further updates.  She understood and appreciated the update.    Total Time Spent on Date of Encounter in care of patient: 30 mins. This time was spent on one or more of the following: performing physical exam; counseling and coordination of care; obtaining or reviewing history; documenting in the medical record; reviewing/ordering tests, medications or procedures; communicating with other healthcare professionals and discussing with patient's family/caregivers.    Current Length of Stay: 1 day(s)  Current Patient Status: Inpatient     Code Status: Level 3 - DNAR and DNI    Subjective:   Patient endorses feeling well; without complaints.  Denies headache, chest pain, nausea, vomiting, diarrhea, constipation    Objective:     Vitals:   Temp (24hrs), Av.1 °F (36.7 °C), Min:97.9 °F (36.6 °C), Max:98.3 °F (36.8 °C)    Temp:  [97.9 °F (36.6 °C)-98.3 °F (36.8 °C)] 97.9 °F (36.6 °C)  HR:  [] 101  Resp:  [17] 17  BP: (113-155)/(63-85) 155/79  SpO2:  [85 %-99 %] 96 %  Body mass index is 29.56 kg/m².     Input and Output Summary (last 24 hours):   No intake or output data in the 24 hours ending 07/15/24 5845    Physical Exam:   Physical Exam  Constitutional:       Appearance: She is obese.   HENT:      Head: Normocephalic.   Cardiovascular:      Rate and Rhythm: Bradycardia present. Rhythm irregular.   Pulmonary:      Effort: Pulmonary  effort is normal. No respiratory distress.      Breath sounds: Rhonchi and rales present.   Abdominal:      Tenderness: There is no abdominal tenderness.      Hernia: No hernia is present.   Neurological:      Mental Status: She is alert.          Additional Data:     Labs:  Results from last 7 days   Lab Units 07/13/24  0628 07/13/24  0625   WBC Thousand/uL  --  7.98   HEMOGLOBIN g/dL  --  13.1   I STAT HEMOGLOBIN g/dl 13.3  --    HEMATOCRIT %  --  40.0   HEMATOCRIT, ISTAT % 39  --    PLATELETS Thousands/uL  --  281   SEGS PCT %  --  66   LYMPHO PCT %  --  20   MONO PCT %  --  11   EOS PCT %  --  2     Results from last 7 days   Lab Units 07/15/24  1124   SODIUM mmol/L 131*   POTASSIUM mmol/L 4.2   CHLORIDE mmol/L 99   CO2 mmol/L 24   BUN mg/dL 23   CREATININE mg/dL 0.91   ANION GAP mmol/L 8   CALCIUM mg/dL 9.4   ALBUMIN g/dL 4.0   TOTAL BILIRUBIN mg/dL 1.31*   ALK PHOS U/L 69   ALT U/L 36   AST U/L 26   GLUCOSE RANDOM mg/dL 114         Results from last 7 days   Lab Units 07/15/24  1159 07/15/24  0709 07/14/24  2053 07/14/24  1605 07/14/24  1034 07/14/24  0726 07/13/24  2102 07/11/24  1405   POC GLUCOSE mg/dl 104 139 199* 144* 229* 118 212* 183*     Results from last 7 days   Lab Units 07/13/24  0625   HEMOGLOBIN A1C % 8.8*           Lines/Drains:  Invasive Devices       Peripheral Intravenous Line  Duration             Peripheral IV 07/13/24 Right Antecubital 2 days                          Imaging: Reviewed radiology reports from this admission including: chest xray    Recent Cultures (last 7 days):         Last 24 Hours Medication List:   Current Facility-Administered Medications   Medication Dose Route Frequency Provider Last Rate    acetaminophen  650 mg Oral Q8H PRN Sean Foster MD      albuterol  2 puff Inhalation Q6H PRN Sean Foster MD      apixaban  5 mg Oral BID Betsy Srivastava MD      ascorbic acid  500 mg Oral Daily Betsy Srivastava MD      benzonatate  100 mg Oral TID PRN Betsy NASCIMENTO  MD Carola      diltiazem  120 mg Oral Daily JENNY Sapp      ezetimibe  10 mg Oral HS Betsy Srivastava MD      famotidine  10 mg Oral Daily Betsy Srivastava MD      flecainide  150 mg Oral Q12H [facility] Kwabena Dey MD      furosemide  20 mg Oral Daily JENNY Sapp      gabapentin  300 mg Oral HS Betsy Srivastava MD      guaiFENesin  600 mg Oral Q12H [facility] Sean Foster MD      insulin lispro  1-6 Units Subcutaneous TID AC Kwabena Dey MD      levalbuterol  1.25 mg Nebulization TID Addison Purvis MD      metoprolol succinate  100 mg Oral Q12H Kwabena Dey MD      rOPINIRole  2 mg Oral HS Betsy Srivastava MD      umeclidinium  1 puff Inhalation Daily Betsy Srivastava MD      zolpidem  10 mg Oral HS PRN Betsy Srivastava MD          Today, Patient Was Seen By: Kwabena Dey MD    **Please Note: This note may have been constructed using a voice recognition system.**

## 2024-07-15 NOTE — ASSESSMENT & PLAN NOTE
Lab Results   Component Value Date    HGBA1C 8.8 (H) 07/13/2024       Recent Labs     07/14/24  1605 07/14/24 2053 07/15/24  0709 07/15/24  1159   POCGLU 144* 199* 139 104     PLAN  Hold Farxiga  Insulin sliding scale  Diabetic diet  Monitor blood glucose

## 2024-07-16 LAB
ANION GAP SERPL CALCULATED.3IONS-SCNC: 8 MMOL/L (ref 4–13)
BUN SERPL-MCNC: 25 MG/DL (ref 5–25)
CALCIUM SERPL-MCNC: 9 MG/DL (ref 8.4–10.2)
CHLORIDE SERPL-SCNC: 101 MMOL/L (ref 96–108)
CO2 SERPL-SCNC: 22 MMOL/L (ref 21–32)
CREAT SERPL-MCNC: 0.9 MG/DL (ref 0.6–1.3)
GFR SERPL CREATININE-BSD FRML MDRD: 61 ML/MIN/1.73SQ M
GLUCOSE SERPL-MCNC: 135 MG/DL (ref 65–140)
GLUCOSE SERPL-MCNC: 144 MG/DL (ref 65–140)
GLUCOSE SERPL-MCNC: 173 MG/DL (ref 65–140)
GLUCOSE SERPL-MCNC: 191 MG/DL (ref 65–140)
GLUCOSE SERPL-MCNC: 216 MG/DL (ref 65–140)
POTASSIUM SERPL-SCNC: 4.1 MMOL/L (ref 3.5–5.3)
SODIUM SERPL-SCNC: 131 MMOL/L (ref 135–147)

## 2024-07-16 PROCEDURE — 99232 SBSQ HOSP IP/OBS MODERATE 35: CPT | Performed by: INTERNAL MEDICINE

## 2024-07-16 PROCEDURE — 80048 BASIC METABOLIC PNL TOTAL CA: CPT

## 2024-07-16 PROCEDURE — 94760 N-INVAS EAR/PLS OXIMETRY 1: CPT

## 2024-07-16 PROCEDURE — 94640 AIRWAY INHALATION TREATMENT: CPT

## 2024-07-16 PROCEDURE — 82948 REAGENT STRIP/BLOOD GLUCOSE: CPT

## 2024-07-16 PROCEDURE — 97167 OT EVAL HIGH COMPLEX 60 MIN: CPT

## 2024-07-16 RX ORDER — ACETYLCYSTEINE 200 MG/ML
3 SOLUTION ORAL; RESPIRATORY (INHALATION) DAILY
Status: DISCONTINUED | OUTPATIENT
Start: 2024-07-16 | End: 2024-07-17 | Stop reason: HOSPADM

## 2024-07-16 RX ORDER — ACETYLCYSTEINE 200 MG/ML
3 SOLUTION ORAL; RESPIRATORY (INHALATION) 2 TIMES DAILY
Status: DISCONTINUED | OUTPATIENT
Start: 2024-07-16 | End: 2024-07-16

## 2024-07-16 RX ORDER — ACETYLCYSTEINE 200 MG/ML
3 SOLUTION ORAL; RESPIRATORY (INHALATION) ONCE
Status: DISCONTINUED | OUTPATIENT
Start: 2024-07-16 | End: 2024-07-16

## 2024-07-16 RX ORDER — FUROSEMIDE 20 MG/1
20 TABLET ORAL ONCE
Status: COMPLETED | OUTPATIENT
Start: 2024-07-16 | End: 2024-07-16

## 2024-07-16 RX ORDER — FUROSEMIDE 40 MG/1
40 TABLET ORAL DAILY
Status: DISCONTINUED | OUTPATIENT
Start: 2024-07-17 | End: 2024-07-17 | Stop reason: HOSPADM

## 2024-07-16 RX ADMIN — LEVALBUTEROL HYDROCHLORIDE 1.25 MG: 1.25 SOLUTION RESPIRATORY (INHALATION) at 07:23

## 2024-07-16 RX ADMIN — FLECAINIDE ACETATE 150 MG: 50 TABLET ORAL at 21:09

## 2024-07-16 RX ADMIN — ACETYLCYSTEINE 600 MG: 200 SOLUTION ORAL; RESPIRATORY (INHALATION) at 13:49

## 2024-07-16 RX ADMIN — GABAPENTIN 300 MG: 300 CAPSULE ORAL at 21:09

## 2024-07-16 RX ADMIN — GUAIFENESIN 600 MG: 600 TABLET ORAL at 21:10

## 2024-07-16 RX ADMIN — FUROSEMIDE 20 MG: 20 TABLET ORAL at 08:01

## 2024-07-16 RX ADMIN — OXYCODONE HYDROCHLORIDE AND ACETAMINOPHEN 500 MG: 500 TABLET ORAL at 08:00

## 2024-07-16 RX ADMIN — UMECLIDINIUM 1 PUFF: 62.5 AEROSOL, POWDER ORAL at 08:05

## 2024-07-16 RX ADMIN — FLECAINIDE ACETATE 150 MG: 50 TABLET ORAL at 08:01

## 2024-07-16 RX ADMIN — APIXABAN 5 MG: 5 TABLET, FILM COATED ORAL at 08:00

## 2024-07-16 RX ADMIN — INSULIN LISPRO 1 UNITS: 100 INJECTION, SOLUTION INTRAVENOUS; SUBCUTANEOUS at 11:47

## 2024-07-16 RX ADMIN — METOPROLOL SUCCINATE 100 MG: 100 TABLET, EXTENDED RELEASE ORAL at 08:01

## 2024-07-16 RX ADMIN — DILTIAZEM HYDROCHLORIDE 120 MG: 120 CAPSULE, COATED, EXTENDED RELEASE ORAL at 08:00

## 2024-07-16 RX ADMIN — METOPROLOL SUCCINATE 100 MG: 100 TABLET, EXTENDED RELEASE ORAL at 19:28

## 2024-07-16 RX ADMIN — APIXABAN 5 MG: 5 TABLET, FILM COATED ORAL at 17:11

## 2024-07-16 RX ADMIN — GUAIFENESIN 600 MG: 600 TABLET ORAL at 08:00

## 2024-07-16 RX ADMIN — ZOLPIDEM TARTRATE 10 MG: 5 TABLET, COATED ORAL at 22:55

## 2024-07-16 RX ADMIN — ROPINIROLE HYDROCHLORIDE 2 MG: 1 TABLET, FILM COATED ORAL at 21:09

## 2024-07-16 RX ADMIN — FAMOTIDINE 10 MG: 20 TABLET ORAL at 08:00

## 2024-07-16 RX ADMIN — LEVALBUTEROL HYDROCHLORIDE 1.25 MG: 1.25 SOLUTION RESPIRATORY (INHALATION) at 20:48

## 2024-07-16 RX ADMIN — FUROSEMIDE 20 MG: 20 TABLET ORAL at 11:47

## 2024-07-16 RX ADMIN — EZETIMIBE 10 MG: 10 TABLET ORAL at 21:10

## 2024-07-16 RX ADMIN — LEVALBUTEROL HYDROCHLORIDE 1.25 MG: 1.25 SOLUTION RESPIRATORY (INHALATION) at 13:48

## 2024-07-16 RX ADMIN — INSULIN LISPRO 2 UNITS: 100 INJECTION, SOLUTION INTRAVENOUS; SUBCUTANEOUS at 17:11

## 2024-07-16 NOTE — OCCUPATIONAL THERAPY NOTE
Occupational Therapy Evaluation     Patient Name: Farnaz Martin  Today's Date: 7/16/2024  Problem List  Principal Problem:    Syncope and collapse  Active Problems:    Essential hypertension    Hyperlipidemia    Insomnia    Hyponatremia    Chronic heart failure with preserved ejection fraction (HCC)    Paroxysmal atrial fibrillation (HCC)    Type 2 diabetes mellitus with microalbuminuria, without long-term current use of insulin (HCC)    Pleural effusion, right    Past Medical History  Past Medical History:   Diagnosis Date    Actinic keratosis     last assessed - 06Jun2014    Arthritis     Atrial fibrillation with rapid ventricular response (HCC)     last assessed - 26Apr2016    Atrial fibrillation with rapid ventricular response (HCC) 04/19/2016    Basal cell carcinoma     Benign colon polyp     last assessed - 27Apr2015    Bronchiectasis without complication (HCC)     CHF (congestive heart failure) (HCC) 06/2022    Chronic diastolic CHF (congestive heart failure) (HCC)     Disease of thyroid gland     Effusion of knee joint right     Resolved - 19Apr2016    Esophageal reflux     Fibromyalgia     last assessed - [id number]    Fibromyalgia, primary     GERD (gastroesophageal reflux disease)     Hyperlipidemia     Hypertension     Hyponatremia     Malignant neoplasm of thyroid gland (HCC)     Meningioma (HCC)     MGUS (monoclonal gammopathy of unknown significance)     Palpitations     last assessed - 30Apr2013    Peroneal tendonitis, right     last assessed - [id number]    Right lumbar radiculopathy 03/17/2016    Thyroid cancer (HCC)     Thyroid nodule     Trochanteric bursitis of left hip 03/09/2018     Past Surgical History  Past Surgical History:   Procedure Laterality Date    CARDIAC ELECTROPHYSIOLOGY STUDY AND ABLATION  08/2022    CATARACT EXTRACTION Bilateral     COLONOSCOPY  03/2018    COLONOSCOPY W/ POLYPECTOMY  2021    repeat in 5 years    EYE SURGERY      OOPHORECTOMY      MN NEUROPLASTY &/TRANSPOS  MEDIAN NRV CARPAL TUNNE Right 2019    Procedure: RELEASE CARPAL TUNNEL;  Surgeon: Onesimo Tate MD;  Location: [facility];  Service: Orthopedics    CT TOTAL THYROID LOBECTOMY UNI W/WO ISTHMUSECTOMY Left 2020    Procedure: Left THYROID LOBECTOMY;  Surgeon: Jhony Bhatt MD;  Location: [facility];  Service: ENT    RECTAL POLYPECTOMY      REPLACEMENT TOTAL KNEE Left     last assessed - [id number]    TONSILLECTOMY      TOTAL ABDOMINAL HYSTERECTOMY      TUBAL LIGATION      US GUIDED THYROID BIOPSY  10/14/2020        Patient's identity confirmed via 2 patient identifiers (full name and ) at start of session      24 0953   OT Last Visit   OT Visit Date 24   Note Type   Note type Evaluation   Pain Assessment   Pain Assessment Tool 0-10   Pain Score No Pain   Restrictions/Precautions   Weight Bearing Precautions Per Order No   Other Precautions Cognitive;Chair Alarm;Bed Alarm;Fall Risk   Home Living   Type of Home House   Home Layout One level;Performs ADLs on one level;Able to live on main level with bedroom/bathroom;Stairs to enter with rails  (2 CHIN to enter)   Bathroom Shower/Tub Walk-in shower   Bathroom Toilet Raised   Bathroom Equipment Grab bars in shower;Shower chair;Commode;Grab bars around toilet   Bathroom Accessibility Accessible   Home Equipment Cane;Life alert;Walker   Prior Function   Level of [place] Independent with ADLs;Independent with functional mobility;Needs assistance with IADLS   Lives With (S)  Alone   Receives Help From Family  (daughter lives nextdoor)   IADLs Independent with driving;Independent with meal prep;Family/Friend/Other provides medication management  (daughter fills pill box for 2 weeks)   Falls in the last 6 months 1 to 4  (4 falls per pt; 1 as reason for admission)   Vocational Retired   Comments Pt reports she is (I) w/ ADLs PTA, uses RW vs SPC for mobility as she feels she needs it. Daughter lives nextdoor and visits as needed (she is very busy). Will  "lightly A w/ IADLs.   Lifestyle   Autonomy Pt lives alone in a 1 level house and is (I) w/ ADLs PTA, RW vs SPC, (+) falls, (+)    Reciprocal Relationships Supportive daughter   Service to Others Retired   Intrinsic Gratification Pt enjoys going to the salon and getting her hair done   General   Additional Pertinent History Pt admitted s/p fall and being on the floor for many hours (does not recall how/why), no acute injuries per trauma however found to have pleural effusion, R. PMH includes: HTN, HLD, CHF, A-fib, T2DM, fibromyalgia, peripheral neuropathy, hx of meningioma   Family/Caregiver Present No   Subjective   Subjective \"I am not quite sure what happened\" \"I wasn't sure if I was home or not so I didn't press my life alert button right away\"   ADL   Where Assessed Standing at sink  (toilet in bathroom)   Eating Assistance 7  Independent   Eating Deficit Setup   Grooming Assistance 5  Supervision/Setup   Grooming Deficit Setup;Brushing hair;Wash/dry hands  (standing at sink in bathroom)   UB Bathing Assistance 5  Supervision/Setup   LB Bathing Assistance 5  Supervision/Setup   UB Dressing Assistance 5  Supervision/Setup   LB Dressing Assistance 5  Supervision/Setup   LB Dressing Deficit Setup;Supervision/safety;Increased time to complete;Thread RLE into underwear;Thread LLE into underwear;Pull up over hips  (sitting on toilet, (+) time)   Toileting Assistance  5  Supervision/Setup   Toileting Deficit Setup;Increased time to complete;Grab bar use;Clothing management up;Clothing management down;Perineal hygiene  (hygiene seated on toilet)   Bed Mobility   Supine to Sit 5  Supervision   Additional items Assist x 1;HOB elevated;Increased time required;Verbal cues   Additional Comments OOB to recliner at end of session   Transfers   Sit to Stand 5  Supervision   Additional items Assist x 1;Increased time required;Verbal cues   Stand to Sit 5  Supervision   Additional items Assist x 1;Increased time " required;Verbal cues   Toilet transfer 5  Supervision   Additional items Assist x 1;Increased time required;Verbal cues;Standard toilet   Functional Mobility   Functional Mobility 5  Supervision   Additional Comments Household distance to bathroom and naomi w/ RW and S   Additional items Rolling walker   Balance   Static Sitting Fair +   Dynamic Sitting Fair   Static Standing Fair -   Dynamic Standing Fair -   Activity Tolerance   Activity Tolerance Patient limited by fatigue   Medical Staff Made Aware Spoke to SANDEEP Stark   Nurse Made Aware yes, RN Anuradha   RUE Assessment   RUE Assessment WFL   LUE Assessment   LUE Assessment WFL   Hand Function   Gross Motor Coordination Functional   Fine Motor Coordination Functional   Sensation   Light Touch No apparent deficits   Vision-Basic Assessment   Current Vision Wears glasses all the time   Cognition   Overall Cognitive Status (S)  Impaired  (grossly WFL in conversation, questionable higher level deficits noted)   Arousal/Participation Alert;Cooperative   Attention Attends with cues to redirect   Orientation Level Oriented X4   Memory Decreased short term memory;Decreased recall of recent events   Following Commands Follows one step commands without difficulty   Comments Pleasant and agreeable. Whifty. Does not recall how she fell, reports she was confused and was usure if her life alert button would activate if she was not home. Hx of MoCA 6/16/22 scoring 22/30 indicating mild cognitive impairment. Recommend f/u w/ OPOT for continued cognitive assessment and participation in FITNESS TO DRIVE EVALUATION   Assessment   Limitation Decreased Safe judgement during ADL;Decreased cognition;Decreased endurance;Decreased self-care trans;Decreased high-level ADLs  (balance, fxnl mobility, act mona, fxnl reach, standing mona, and strength, safety awareness, insight, and problem solving, response time)   Prognosis Good   Assessment Pt is a 78 y.o. female seen for OT evaluation s/p admit  to [facility]13[facility]24 w/Syncope and collapse. Prior to admission, pt was living alone in a 1 level house, (I) with ADLs, (I) with most IADLs aside from medications, (+) falls, (+) . Personal and environmental factors affecting patient at time of evaluation include current habits and behavioral patterns, difficulty completing ADLs, and difficulty completing IADLs. Personal factors supporting patient at time of evaluation include age, (I) PLOF, supportive daughter who lives nextdoor, attitude towards recovery, and SU. Based upon this evaluation, pt is functioning below baseline due to recent fall hx, decreased safety awareness, memory/global cognition, decreased functional reach, balance and standing tolerance impacting safety/independence w/ ADLs/mobility. Pt will benefit from continued skilled inpatient OT to maximize safety, level of independence and overall performance in ADLs, functional transfers, functional mobility to return to functional baseline/highest level of function.   Goals   Patient Goals to go home   LTG Time Frame 10-14   Long Term Goal #1 see goals below   Plan   Treatment Interventions ADL retraining;Functional transfer training;Cognitive reorientation;Patient/family training;Equipment evaluation/education;Compensatory technique education;Continued evaluation   Goal Expiration Date 07/26/24   OT Treatment Day 0   OT Frequency 2-3x/wk   Discharge Recommendation   Rehab Resource Intensity Level, OT (S)  III (Minimum Resource Intensity)  (f/u w/ OPOT for FITNESS TO DRIVE EVALUATION)   AM-PAC Daily Activity Inpatient   Lower Body Dressing 3   Bathing 3   Toileting 3   Upper Body Dressing 4   Grooming 4   Eating 4   Daily Activity Raw Score 21   Daily Activity Standardized Score (Calc for Raw Score >=11) 44.27   AM-PAC Applied Cognition Inpatient   Following a Speech/Presentation 3   Understanding Ordinary Conversation 4   Taking Medications 2   Remembering Where Things Are Placed  or Put Away 3   Remembering List of 4-5 Errands 2   Taking Care of Complicated Tasks 2   Applied Cognition Raw Score 16   Applied Cognition Standardized Score 35.03   End of Consult   Patient Position at End of Consult Bedside chair;Bed/Chair alarm activated;All needs within reach   Nurse Communication Nurse aware of consult     GOALS:    *Goals established to promote patient goal of to go home:      *Patient will perform grooming tasks standing at sink with (I) in order to increase overall independence    *LB ADL with (I) using AE prn for inc'd independence with self care    *Toileting with (I) for clothing management and hygiene to increase hygiene/thoroughness in order to reduce caregiver burden    *ADL transfers with (I) for inc'd independence with ADLs/purposeful tasks    *Bed mobility- (I) for inc'd independence to manage own comfort and initiate EOB & OOB purposeful tasks    *Patient will increase functional mobility to and from bathroom with rolling walker independently to increase independence with toileting    *Increase stand tolerance x 5-8  m for inc'd tolerance with standing purposeful tasks including meal prep/household management    *Patient will engage in ongoing cognitive assessment to assist with safe discharge planning/recommendations.     The patient's raw score on the -PAC Daily Activity Inpatient Short Form is 21. A raw score of greater than or equal to 19 suggests the patient may benefit from discharge to home. Please also refer to the recommendation of the Occupational Therapist for safe discharge planning.      RADHA Pan, OTR/L    [place] License [id number]  [place] License [id number]

## 2024-07-16 NOTE — PROGRESS NOTES
Progress Note - Pulmonary   Farnaz Martin 78 y.o. female MRN: [id number]  Unit/Bed#: S -01 Encounter: [id number]    Assessment/Plan:    Acute hypoxic respiratory failure-improved  -98% on room air, patient does not wear any supplemental O2 at home  -Continue to maintain saturation greater than 89%  -Pulmonary hygiene encouraged: Deep breathing with cough, OOB as tolerated, incentive spirometry and flutter valve     Right pleural effusion  -Likely secondary to CHF  -CT chest abdomen pelvis 07/13/2024 showed new large dependent right pleural effusion with associated atelectatic changes and small left effusion  -WBC 7.98  -TTE 07/15/2024 shows EF 50%, systolic function mildly reduced mild global hypokinesis.  Diastolic flattening of the interventricular septum consistent with right ventricle volume overload.  RVSP 50.00 mmHg  -Thoracentesis 07/15/2024 700 mL clearly yellow fluid drained-appears transudative in nature histiocyte predominant -cytology pending  -Chest x-ray completed s/p thoracentesis shows no pneumothorax no definite right effusion trace left effusion    Chronic diastolic congestive heart failure w/ PHT likely WHO group II & III  -TTE completed 07/14/2024 EF 50%, systolic function mildly reduced mild global hypokinesis.  Diastolic flattening of the interventricular septum consistent with right ventricle volume overload.  RVSP 50.00 mmHg  -Echo from May 2024 showed EF 55% normal wall motion, normal RV size and/function moderate to severe MR, RVSP 66.00 mmHg  -IV fluids given on admission due to acute kidney injury  -On Lasix 20 mg at home, will resume when kidney function is improved  -Continue I's and O's and daily weights     Chronic cough secondary to bronchiectasis and atelectasis  -Home medication regime Spiriva 2.5 mcg/ACT, Mucomyst nebulized solution every 4 hours as needed hypertonic saline nebulized solution, and albuterol 90 mcg/ACT 2 puffs every 6 hours as needed, Zyrtec 10 mg once  "daily, Atrovent nasal spray 2 sprays twice daily  -Discontinue Mucomyst due to nausea  -Will continue to monitor off of antibiotics and steroids at this time  -Continue supportive care with Mucinex, Tessalon Perles    Chief Complaint:    I am feeling okay    Subjective:    Patient seen and examined at bedside.  Found sitting up comfortably in a chair.  Denies any fever chills night sweats chest pain or hemoptysis.  Does endorse persistent    Objective:    Vitals: Blood pressure 131/83, pulse 70, temperature 97.7 °F (36.5 °C), resp. rate 16, height 5' 2\" (1.575 m), weight 72.1 kg (159 lb), last menstrual period 02/01/1990, SpO2 98%, not currently breastfeeding.RA,Body mass index is 29.08 kg/m².      Intake/Output Summary (Last 24 hours) at 7/16/2024 1048  Last data filed at 7/16/2024 0856  Gross per 24 hour   Intake 1050 ml   Output --   Net 1050 ml       Invasive Devices       Peripheral Intravenous Line  Duration             Peripheral IV 07/13/24 Right Antecubital 3 days                    Physical Exam:     Physical Exam  Constitutional:       Appearance: She is obese.   HENT:      Head: Normocephalic and atraumatic.      Right Ear: External ear normal.      Left Ear: External ear normal.      Nose: Nose normal.      Mouth/Throat:      Mouth: Mucous membranes are moist.      Pharynx: Oropharynx is clear.   Eyes:      Extraocular Movements: Extraocular movements intact.      Pupils: Pupils are equal, round, and reactive to light.   Cardiovascular:      Rate and Rhythm: Normal rate and regular rhythm.      Pulses: Normal pulses.      Heart sounds: Normal heart sounds.   Pulmonary:      Effort: Pulmonary effort is normal.      Breath sounds: No wheezing or rhonchi.      Comments: Diminished breath sounds in bilateral bases  Abdominal:      General: Bowel sounds are normal.      Tenderness: There is no abdominal tenderness.   Musculoskeletal:         General: Normal range of motion.      Cervical back: Normal range " "of motion and neck supple.      Right lower leg: No edema.      Left lower leg: No edema.   Skin:     General: Skin is warm.   Neurological:      Mental Status: She is alert and oriented to person, place, and time.   Psychiatric:         Mood and Affect: Mood normal.         Behavior: Behavior normal.         Thought Content: Thought content normal.         Judgment: Judgment normal.         Labs: I have personally reviewed pertinent lab results., ABG: No results found for: \"PHART\", \"JMY4TSG\", \"PO2ART\", \"OTE1YWD\", \"C8FLYWOT\", \"BEART\", \"SOURCE\", BNP: No results found for: \"BNP\", CBC: No results found for: \"WBC\", \"HGB\", \"HCT\", \"MCV\", \"PLT\", \"ADJUSTEDWBC\", \"RBC\", \"MCH\", \"MCHC\", \"RDW\", \"MPV\", \"NRBC\", CMP:   Lab Results   Component Value Date    SODIUM 131 (L) 07/16/2024    K 4.1 07/16/2024     07/16/2024    CO2 22 07/16/2024    BUN 25 07/16/2024    CREATININE 0.90 07/16/2024    CALCIUM 9.0 07/16/2024    AST 26 07/15/2024    ALT 36 07/15/2024    ALKPHOS 69 07/15/2024    EGFR 61 07/16/2024       Imaging and other studies: I have personally reviewed pertinent reports.    Chest x-ray s/p thoracentesis on 07/15/2024 shows no pneumothorax after thoracentesis.  No definite right effusion.  Trace left effusion  "

## 2024-07-16 NOTE — PROGRESS NOTES
General Cardiology   Progress Note -  Team One   Farnaz Martin 78 y.o. female MRN: [id number]  Unit/Bed#: S -01 Encounter: [id number]    Assessment     Weakness, syncope vs fall  Hs troponin negative  ECG AV paced  Device interrogation- 3.9% Afib burden. Device functioning appropriately. Lower ventricular rate set to 60 bpm.  Confused at time of fall and after waking up  Low BPs in the mornings recently, SBP in the 90s. PCP recently stopped PM dose of diltiazem. BP stable here.     Paroxysmal atrial fibrillation/flutter  Hx of atrial fibrillation s/p ablation x 2, atrial flutter ablation as well  History of atrial tachycardia on device interrogations  PTA-Tambocor 150 mg twice daily, Toprol  mg twice daily and diltiazem 120 mg daily  History of QTc prolongation and torsades with sotalol  Anticoagulated on Eliquis 5 mg twice daily     3.  Large right pleural effusion  Worsening effusion on admission imaging despite overall stable volume status  S/p bedside thoracentesis with 700 mL yellow fluid removed on 7/15. Likely transudative. Cytology pending.     4.  Chronic diastolic CHF  Home diuretic: lasix 20 mg daily  Weight: 159 lb  Dry weight: 156 lb  S/p IVF from ED due to KIMBERLEY with improved creatinine. Restarted on Lasix 20 mg PO daily on 7/15  Not significantly overloaded on exam however evidence of RV volume overload on echo     5.  HTN-stable, 24-hour average /73 on Toprol- mg every 12 hours, Lasix 20 mg PO daily, diltiazem 120 mg daily.      6.  HLD-on Zetia     7.  Chronic hyponatremia-stable, sodium 131 today, from 128 on admission     8.  Valvular heart disease- Moderate to severe MR- LVEF 50%, RV dilated with diastolic flattening of the intraventricular septum consistent with RV volume overload. Mild MR but limited views provided. IVC dilated.     9.  Bronchiectasis- chronic cough, sees pulmonology     10. Left renal mass- outpatient follow up     Plan  Stable from cardiac  "standpoint  Will increase Lasix to 40 mg daily   Continue rest of regimen as ordered  Outpatient follow up arranged    Subjective  She continues to complain of significant coughing but feels her breathing is at baseline.  Denies orthopnea, chest pain, palpitations, dizziness/lightheadedness.  Review of Systems   Constitutional: Positive for malaise/fatigue. Negative for chills and weight gain.   Cardiovascular:  Negative for chest pain, dyspnea on exertion, leg swelling, orthopnea, palpitations and syncope.   Respiratory:  Positive for cough. Negative for shortness of breath, sleep disturbances due to breathing and sputum production.    Endocrine: Negative.    Hematologic/Lymphatic: Negative.    Gastrointestinal:  Negative for bloating, nausea and vomiting.   Genitourinary: Negative.    Neurological:  Negative for dizziness, light-headedness and weakness.   Psychiatric/Behavioral:  Negative for altered mental status.    All other systems reviewed and are negative.    Objective:   Vitals: Blood pressure 131/83, pulse 70, temperature 97.7 °F (36.5 °C), resp. rate 16, height 5' 2\" (1.575 m), weight 72.1 kg (159 lb), last menstrual period 1990, SpO2 99%, not currently breastfeeding., Body mass index is 29.08 kg/m².,     Systolic (24hrs), Av , Min:107 , Max:155     Diastolic (24hrs), Av, Min:55, Max:83      Intake/Output Summary (Last 24 hours) at 2024 0850  Last data filed at 7/15/2024 1742  Gross per 24 hour   Intake 480 ml   Output --   Net 480 ml     Wt Readings from Last 3 Encounters:   24 72.1 kg (159 lb)   24 73.7 kg (162 lb 8 oz)   24 69.9 kg (154 lb)     Telemetry Review: N/a    Physical Exam  Vitals reviewed.   Constitutional:       General: She is not in acute distress.  Neck:      Vascular: No hepatojugular reflux or JVD.   Cardiovascular:      Rate and Rhythm: Normal rate. Rhythm irregular.      Heart sounds: Normal heart sounds. No murmur heard.     No friction rub. No " gallop.   Pulmonary:      Effort: Pulmonary effort is normal. No respiratory distress.      Breath sounds: No rales.      Comments: Crackles RLL, on room air  Abdominal:      General: Bowel sounds are normal. There is no distension.      Palpations: Abdomen is soft.      Tenderness: There is no abdominal tenderness.   Musculoskeletal:         General: No tenderness. Normal range of motion.      Cervical back: Neck supple.      Right lower leg: No edema.      Left lower leg: No edema.   Skin:     General: Skin is warm and dry.      Capillary Refill: Capillary refill takes less than 2 seconds.      Findings: No erythema.   Neurological:      Mental Status: She is alert and oriented to person, place, and time.   Psychiatric:         Mood and Affect: Mood normal.     LABORATORY RESULTS  Results from last 7 days   Lab Units 07/13/24  0625   CK TOTAL U/L 68     CBC with diff:   Results from last 7 days   Lab Units 07/13/24  0628 07/13/24  0625   WBC Thousand/uL  --  7.98   HEMOGLOBIN g/dL  --  13.1   I STAT HEMOGLOBIN g/dl 13.3  --    HEMATOCRIT %  --  40.0   HEMATOCRIT, ISTAT % 39  --    MCV fL  --  92   PLATELETS Thousands/uL  --  281   RBC Million/uL  --  4.36   MCH pg  --  30.0   MCHC g/dL  --  32.8   RDW %  --  13.9   MPV fL  --  9.9   NRBC AUTO /100 WBCs  --  0     CMP:  Results from last 7 days   Lab Units 07/16/24  0508 07/15/24  1124 07/15/24  0509 07/14/24  0501 07/13/24  0628 07/13/24  0625   POTASSIUM mmol/L 4.1 4.2 4.3 4.2  --  4.6   CHLORIDE mmol/L 101 99 103 101  --  95*   CO2 mmol/L 22 24 22 23  --  23   CO2, I-STAT mmol/L  --   --   --   --  26  --    BUN mg/dL 25 23 26* 31*  --  41*   CREATININE mg/dL 0.90 0.91 0.90 1.08  --  1.34*   GLUCOSE, ISTAT mg/dl  --   --   --   --  148*  --    CALCIUM mg/dL 9.0 9.4 8.8 8.8  --  9.5   AST U/L  --  26  --   --   --   --    ALT U/L  --  36  --   --   --   --    ALK PHOS U/L  --  69  --   --   --   --    EGFR ml/min/1.73sq m 61 60 61 48  --  37     BMP:  Results  from last 7 days   Lab Units 24  0508 07/15/24  1124 07/15/24  0509 24  0501 24  0628 24  0625   POTASSIUM mmol/L 4.1 4.2 4.3 4.2  --  4.6   CHLORIDE mmol/L 101 99 103 101  --  95*   CO2 mmol/L 22 24 22 23  --  23   CO2, I-STAT mmol/L  --   --   --   --  26  --    BUN mg/dL 25 23 26* 31*  --  41*   CREATININE mg/dL 0.90 0.91 0.90 1.08  --  1.34*   GLUCOSE, ISTAT mg/dl  --   --   --   --  148*  --    CALCIUM mg/dL 9.0 9.4 8.8 8.8  --  9.5     Lab Results   Component Value Date    CREATININE 0.90 2024    CREATININE 0.91 07/15/2024    CREATININE 0.90 07/15/2024     Lab Results   Component Value Date    NTBNP 761 (H) 2021    NTBNP 2,773 (H) 2021    NTBNP 506 (H) 2019      Results from last 7 days   Lab Units 24  0501   MAGNESIUM mg/dL 2.1     Results from last 7 days   Lab Units 24  0625   HEMOGLOBIN A1C % 8.8*     Lipid Profile:   Lab Results   Component Value Date    CHOL 205 2015    CHOL 195 07/10/2014     Lab Results   Component Value Date    HDL 46 (L) 2024    HDL 53 2023    HDL 66 2021     Lab Results   Component Value Date    LDLCALC 64 2024    LDLCALC 96 2023    LDLCALC 88 2021     Lab Results   Component Value Date    TRIG 63 2024    TRIG 68 2023    TRIG 112 2021       Cardiac testing:   Results for orders placed during the hospital encounter of 21    Echo complete with contrast if indicated    [facility]  [address]  [place] [address] [phone]    Transthoracic Echocardiogram  2D, M-mode, Doppler, and Color Doppler    Study date:  2021    Patient: TANISHA LEVINE  MR number: [id number]  Account number: [id number]  : 1945  Age: 75 years  Gender: Female  Status: Inpatient  Location: Bedside  Height: 62 in  Weight: 151.6 lb  BP: 126/ 94 mmHg    Indications: Atrial fibrillation    Diagnoses: I48.0 - Atrial fibrillation    Sonographer:   IGNACIO Cameron  Primary Physician:  Naveen Monsivais MD  Referring Physician:  Shantal Huff MD  Group:  [facility]  Interpreting Physician:  Kwabena Seymour MD    SUMMARY    LEFT VENTRICLE:  Systolic function was normal. Ejection fraction was estimated to be 55 %.  There were no regional wall motion abnormalities.  Wall thickness was at the upper limits of normal.  Doppler parameters were consistent with elevated ventricular end-diastolic filling pressure.    LEFT ATRIUM:  The atrium was mildly to moderately dilated.    RIGHT ATRIUM:  The atrium was mildly dilated.    MITRAL VALVE:  There was mild stenosis.    TRICUSPID VALVE:  There was mild to moderate regurgitation.  Pulmonary artery systolic pressure was mildly increased.    HISTORY: PRIOR HISTORY: HLD, HTN, PAF, chronic CHF, thyroid nodule    PROCEDURE: The procedure was performed at the bedside. This was a routine study. The transthoracic approach was used. The study included complete 2D imaging, M-mode, complete spectral Doppler, and color Doppler. The heart rate was 101 bpm,  at the start of the study. Images were obtained from the parasternal, apical, subcostal, and suprasternal notch acoustic windows. Echocardiographic views were limited due to lung interference. This was a technically difficult study.    LEFT VENTRICLE: Size was normal. Systolic function was normal. Ejection fraction was estimated to be 55 %. There were no regional wall motion abnormalities. Wall thickness was at the upper limits of normal. DOPPLER: Transmitral flow  pattern: atrial fibrillation. Left ventricular diastolic function parameters were abnormal. Doppler parameters were consistent with elevated ventricular end-diastolic filling pressure.    RIGHT VENTRICLE: The size was normal. Systolic function was normal. Wall thickness was normal.    LEFT ATRIUM: The atrium was mildly to moderately dilated.    RIGHT ATRIUM: The atrium was mildly  dilated.    MITRAL VALVE: Valve structure was normal. There was normal leaflet separation. DOPPLER: The transmitral velocity was within the normal range. There was mild stenosis. There was no significant regurgitation.    AORTIC VALVE: The valve was trileaflet. Leaflets exhibited normal thickness and normal cuspal separation. DOPPLER: Transaortic velocity was within the normal range. There was no evidence for stenosis. There was no significant  regurgitation.    TRICUSPID VALVE: The valve structure was normal. There was normal leaflet separation. DOPPLER: The transtricuspid velocity was within the normal range. There was no evidence for stenosis. There was mild to moderate regurgitation. Pulmonary  artery systolic pressure was mildly increased.    PULMONIC VALVE: Leaflets exhibited normal thickness, no calcification, and normal cuspal separation. DOPPLER: The transpulmonic velocity was within the normal range. There was no significant regurgitation.    PERICARDIUM: There was no pericardial effusion. The pericardium was normal in appearance.    AORTA: The root exhibited normal size.    SYSTEMIC VEINS: IVC: The inferior vena cava was normal in size.    PULMONARY VEINS: DOPPLER: Doppler signals were not recordable in the pulmonary vein(s).    SYSTEM MEASUREMENT TABLES    2D  %FS: 37.15 %  Ao Diam: 2.74 cm  Ao asc: 2.72 cm  EDV(Teich): 103.17 ml  EF(Teich): 67.06 %  ESV(Teich): 33.98 ml  IVSd: 0.94 cm  LA Area: 13.2 cm2  LA Diam: 3.98 cm  LVEDV MOD A4C: 32.01 ml  LVEF MOD A4C: 63.74 %  LVESV MOD A4C: 11.61 ml  LVIDd: 4.72 cm  LVIDs: 2.96 cm  LVLd A4C: 5.9 cm  LVLs A4C: 4.79 cm  LVPWd: 0.93 cm  RA Area: 8.67 cm2  RVIDd: 3.35 cm  SV MOD A4C: 20.4 ml  SV(Teich): 69.19 ml    CW  TR MaxP.39 mmHg  TR Vmax: 2.89 m/s    MM  TAPSE: 1.51 cm    [employer] Accredited Echocardiography Laboratory    Prepared and electronically signed by    Kwabena Seymour MD  Signed 2021 11:05:34    No results found for this  or any previous visit.    No results found for this or any previous visit.    No valid procedures specified.  Results for orders placed during the hospital encounter of 10/05/22    NM myocardial perfusion spect (stress and/or rest)    Interpretation Summary    Resting ECG: ECG is abnormal. Resting ECG shows no ST-segment deviation. Patient a paced and V sensed.    Perfusion Defect Conclusion: The stress/rest perfusion ratio is 1.04 . There is no evidence of transient ischemic dilation (TID).    Perfusion: There is a left ventricular perfusion defect that is small in size with mild reduction in uptake present in the mid to apical anterior and anteroseptal location(s) that is predominantly fixed. The defect appears to be an artifact caused by breast attenuation.    Stress Function: Left ventricular function post-stress is normal. Post-stress ejection fraction is 70 %.    Negative exercise pharmacological stress test    Meds/Allergies   all current active meds have been reviewed and current meds:   Current Facility-Administered Medications   Medication Dose Route Frequency    acetaminophen (TYLENOL) tablet 650 mg  650 mg Oral Q8H PRN    acetylcysteine (MUCOMYST) 200 mg/mL inhalation solution 600 mg  3 mL Nebulization Once    albuterol (PROVENTIL HFA,VENTOLIN HFA) inhaler 2 puff  2 puff Inhalation Q6H PRN    apixaban (ELIQUIS) tablet 5 mg  5 mg Oral BID    ascorbic acid (VITAMIN C) tablet 500 mg  500 mg Oral Daily    benzonatate (TESSALON PERLES) capsule 100 mg  100 mg Oral TID PRN    diltiazem (CARDIZEM CD) 24 hr capsule 120 mg  120 mg Oral Daily    ezetimibe (ZETIA) tablet 10 mg  10 mg Oral HS    famotidine (PEPCID) tablet 10 mg  10 mg Oral Daily    flecainide (TAMBOCOR) tablet 150 mg  150 mg Oral Q12H JOAQUINA    furosemide (LASIX) tablet 20 mg  20 mg Oral Daily    gabapentin (NEURONTIN) capsule 300 mg  300 mg Oral HS    guaiFENesin (MUCINEX) 12 hr tablet 600 mg  600 mg Oral Q12H JOAQUINA    insulin lispro (HumALOG/ADMELOG)  100 units/mL subcutaneous injection 1-6 Units  1-6 Units Subcutaneous TID AC    levalbuterol (XOPENEX) inhalation solution 1.25 mg  1.25 mg Nebulization TID    metoprolol succinate (TOPROL-XL) 24 hr tablet 100 mg  100 mg Oral Q12H    rOPINIRole (REQUIP) tablet 2 mg  2 mg Oral HS    umeclidinium 62.5 mcg/actuation inhaler AEPB 1 puff  1 puff Inhalation Daily    zolpidem (AMBIEN) tablet 10 mg  10 mg Oral HS PRN       Medications Prior to Admission:     acetylcysteine (MUCOMYST) 10 % nebulizer solution    acetylcysteine (MUCOMYST) 200 mg/mL nebulizer solution    albuterol (ProAir HFA) 90 mcg/act inhaler    apixaban (ELIQUIS) 5 mg    ascorbic Acid (VITAMIN C) 500 MG CPCR    benzonatate (TESSALON PERLES) 100 mg capsule    Cetirizine HCl (ZyrTEC Allergy) 10 MG CAPS    Cholecalciferol 50 MCG (2000 UT) CAPS    Chromium Picolinate (CHROMIUM PICOLATE PO)    dapagliflozin 5 MG TABS    diltiazem (CARDIZEM CD) 120 mg 24 hr capsule    ezetimibe (ZETIA) 10 mg tablet    famotidine (PEPCID) 20 mg tablet    flecainide (TAMBOCOR) 100 mg tablet    furosemide (LASIX) 20 mg tablet    gabapentin (NEURONTIN) 300 mg capsule    gabapentin (NEURONTIN) 300 mg capsule    glucose blood (OneTouch Verio) test strip    Insulin Glargine Solostar (Lantus SoloStar) 100 UNIT/ML SOPN    Insulin Pen Needle 31G X 4 MM MISC    ipratropium (ATROVENT) 0.03 % nasal spray    metoprolol succinate (TOPROL-XL) 100 mg 24 hr tablet    OneTouch Delica Lancets 33G MISC    predniSONE 10 mg tablet    rOPINIRole (REQUIP) 1 mg tablet    sodium chloride 3 % inhalation solution    tiotropium (Spiriva Respimat) 2.5 MCG/ACT AERS inhaler    TURMERIC PO    zolpidem (AMBIEN) 10 mg tablet    Counseling / Coordination of Care  Total floor / unit time spent today 20 minutes.  Greater than 50% of total time was spent with the patient and / or family counseling and / or coordination of care.      ** Please Note: Dragon 360 Dictation voice to text software may have been used in the  creation of this document. **

## 2024-07-16 NOTE — ASSESSMENT & PLAN NOTE
Patient admitted for fall due to unknown cause.  Patient has multiple risk factors such as heart conditions; endorsed dehydration; history of urinary tract infections.  Patient has sick sinus with pacemaker with possible dysfunction; consider cardiac etiology  Patient appears hypovolemic on admission  CT Head unremarkable for acute cranial etiology.  Multifactorial-orthostatic hypotension as per reported by daughter vs cardiac etiology with arrhythmia, bradycardia, new onset RV failure vs vasovagal  Per cardiology consultation:  Unlikely to be cardiac related    PLAN  Check Orthostatic pressure  Cardiology consult: Cleared from cardio; outpatient follow up arranged.  Encourage PO intake  OT:  Level 3.  F/U outpatient OT for fitness to drive evaluation.  Possible discharge home tomorrow with home health aide

## 2024-07-16 NOTE — ASSESSMENT & PLAN NOTE
S/P thoracocentesis  Fluid results largely unremarkable.  On admission, CT CAP with contrast: New large dependent right pleural effusion with associated atelectatic changes. Small left effusion.  In the setting of bronchiectasis, pulmonary hypertension and possibly new onset RV failure.

## 2024-07-16 NOTE — CASE MANAGEMENT
Case Management Progress Note    Patient name Farnaz Martin  Location S /S -01 MRN [id number]  : 1945 Date 2024       LOS (days): 2  Geometric Mean LOS (GMLOS) (days): 4.4  Days to GMLOS:2.5        OBJECTIVE:        Current admission status: Inpatient  Preferred Pharmacy:   [id number] - [place] - [address] [place] [address]  [place] [id number]  Phone: [phone] Fax: [phone]    Primary Care Provider: Naveen Monsivais MD    Primary Insurance: MEDICARE  Secondary Insurance: AARP    PROGRESS NOTE:  [facility] able to accept patient- agency reserved in [facility] and added to follow up providers.

## 2024-07-16 NOTE — ASSESSMENT & PLAN NOTE
Lab Results   Component Value Date    HGBA1C 8.8 (H) 07/13/2024       Recent Labs     07/15/24  1159 07/15/24  1456 07/16/24  0722 07/16/24  1133   POCGLU 104 265* 135 173*     PLAN  Hold Farxiga  Insulin sliding scale  Diabetic diet  Monitor blood glucose

## 2024-07-16 NOTE — PLAN OF CARE
Problem: OCCUPATIONAL THERAPY ADULT  Goal: Performs self-care activities at highest level of function for planned discharge setting.  See evaluation for individualized goals.  Description: Treatment Interventions: ADL retraining, Functional transfer training, Cognitive reorientation, Patient/family training, Equipment evaluation/education, Compensatory technique education, Continued evaluation          See flowsheet documentation for full assessment, interventions and recommendations.   Note: Limitation: Decreased Safe judgement during ADL, Decreased cognition, Decreased endurance, Decreased self-care trans, Decreased high-level ADLs (balance, fxnl mobility, act mona, fxnl reach, standing mona, and strength, safety awareness, insight, and problem solving, response time)  Prognosis: Good  Assessment: Pt is a 78 y.o. female seen for OT evaluation s/p admit to [facility] on 7/13/2024 w/Syncope and collapse. Prior to admission, pt was living alone in a 1 level house, (I) with ADLs, (I) with most IADLs aside from medications, (+) falls, (+) . Personal and environmental factors affecting patient at time of evaluation include current habits and behavioral patterns, difficulty completing ADLs, and difficulty completing IADLs. Personal factors supporting patient at time of evaluation include age, (I) PLOF, supportive daughter who lives nextdoor, attitude towards recovery, and FFSU. Based upon this evaluation, pt is functioning below baseline due to recent fall hx, decreased safety awareness, memory/global cognition, decreased functional reach, balance and standing tolerance impacting safety/independence w/ ADLs/mobility. Pt will benefit from continued skilled inpatient OT to maximize safety, level of independence and overall performance in ADLs, functional transfers, functional mobility to return to functional baseline/highest level of function.     Rehab Resource Intensity Level, OT: (S) III (Minimum Resource  Intensity) (f/u w/ OPOT for FITNESS TO DRIVE EVALUATION)     Thu Cantrell, RADHA, OTR/L  [place] License [id number]  [place] License [id number]

## 2024-07-16 NOTE — ASSESSMENT & PLAN NOTE
Wt Readings from Last 3 Encounters:   07/16/24 72.1 kg (159 lb)   07/11/24 73.7 kg (162 lb 8 oz)   06/17/24 69.9 kg (154 lb)     ECHO: 7/14  EF 50%. Systolic function is mildly reduced. There is mild global hypokinesis.   Right ventricular volume overload  Moderately elevated right ventricular systolic pressure: 50.00 mmHg.     ECHO 5/25/2024  EF 55%; Moderate to severe Mitral regurgitation  CT: Mild global cardiomegaly. Dilated hepatic IVC and hepatic veins suggesting possible right heart regurgitation- concerning for RV failure.  Patient appears Euvolemic on admission.  Fluid collection from thoracocentesis is unremarkable transudate fluid    PLAN  Daily standing weight, I/O's.  Increase Lasix to 40 mg

## 2024-07-16 NOTE — PROGRESS NOTES
[facility]  Progress Note  Name: Farnaz Martin I  MRN: [id number]  Unit/Bed#: S -01 I Date of Admission: 7/13/2024   Date of Service: 7/16/2024 I Hospital Day: 2    Assessment & Plan   * Syncope and collapse  Assessment & Plan  Patient admitted for fall due to unknown cause.  Patient has multiple risk factors such as heart conditions; endorsed dehydration; history of urinary tract infections.  Patient has sick sinus with pacemaker with possible dysfunction; consider cardiac etiology  Patient appears hypovolemic on admission  CT Head unremarkable for acute cranial etiology.  Multifactorial-orthostatic hypotension as per reported by daughter vs cardiac etiology with arrhythmia, bradycardia, new onset RV failure vs vasovagal  Per cardiology consultation:  Unlikely to be cardiac related    PLAN  Check Orthostatic pressure  Cardiology consult: Cleared from cardio; outpatient follow up arranged.  Encourage PO intake  OT:  Level 3.  F/U outpatient OT for fitness to drive evaluation.  Possible discharge home tomorrow with home health aide      Paroxysmal atrial fibrillation (HCC)  Assessment & Plan  Patient with history of Atrial fibrillation, with PPM  EKG absent P-waves; Wide QRS waves; Electronically paced    PLAN  Eliquis 5 mg BID  Continue flecainide 150 mg twice daily and Toprol- mg twice daily  Continue diltiazem 120 mg daily     Chronic heart failure with preserved ejection fraction (HCC)  Assessment & Plan  Wt Readings from Last 3 Encounters:   07/16/24 72.1 kg (159 lb)   07/11/24 73.7 kg (162 lb 8 oz)   06/17/24 69.9 kg (154 lb)     ECHO: 7/14  EF 50%. Systolic function is mildly reduced. There is mild global hypokinesis.   Right ventricular volume overload  Moderately elevated right ventricular systolic pressure: 50.00 mmHg.     ECHO 5/25/2024  EF 55%; Moderate to severe Mitral regurgitation  CT: Mild global cardiomegaly. Dilated hepatic IVC and hepatic veins suggesting  possible right heart regurgitation- concerning for RV failure.  Patient appears Euvolemic on admission.  Fluid collection from thoracocentesis is unremarkable transudate fluid    PLAN  Daily standing weight, I/O's.  Increase Lasix to 40 mg    Pleural effusion, right  Assessment & Plan  S/P thoracocentesis  Fluid results largely unremarkable.  On admission, CT CAP with contrast: New large dependent right pleural effusion with associated atelectatic changes. Small left effusion.  In the setting of bronchiectasis, pulmonary hypertension and possibly new onset RV failure.      Type 2 diabetes mellitus with microalbuminuria, without long-term current use of insulin ([facility])  Assessment & Plan  Lab Results   Component Value Date    HGBA1C 8.8 (H) 07/13/2024       Recent Labs     07/15/24  1159 07/15/24  1456 07/16/24  0722 07/16/24  1133   POCGLU 104 265* 135 173*     PLAN  Hold Farxiga  Insulin sliding scale  Diabetic diet  Monitor blood glucose      Hyponatremia  Assessment & Plan  Sodium   Date Value Ref Range Status   07/16/2024 131 (L) 135 - 147 mmol/L Final   08/01/2023 130 (L) 135 - 145 mmol/L Final   05/06/2015 136 136 - 145 mmol/L Final     SODIUM, I-STAT   Date Value Ref Range Status   07/13/2024 129 (L) 136 - 145 mmol/l Final     Improved with IV saline   Per patient daughter; has poor oral intake    PLAN  Encourage p.o. intake.    Insomnia  Assessment & Plan  Well controlled on home regimen    PLAN  Continue home medication Ambien 10mg PRN    Hyperlipidemia  Assessment & Plan  Triglyceride 63; LDL 64; HDL 46; Cholesterol 123    PLAN  Continue Ezetemibe 10mg    Essential hypertension  Assessment & Plan  Blood Pressure: 120/72     PLAN  Monitor BP.  Meds per A-fib           VTE Pharmacologic Prophylaxis: VTE Score: 5 High Risk (Score >/= 5) - Pharmacological DVT Prophylaxis Ordered: apixaban (Eliquis). Sequential Compression Devices Ordered.    Mobility:   Basic Mobility Inpatient Raw Score: 17  -[facility] Goal: 5:  Stand one or more mins  JH-HLM Achieved: 7: Walk 25 feet or more  JH-HLM Goal achieved. Continue to encourage appropriate mobility.    Patient Centered Rounds: I performed bedside rounds with nursing staff today.   Discussions with Specialists or Other Care Team Provider: Attending Dr Jacome    Education and Discussions with Family / Patient: Updated  (daughter) via phone.  Spoke in regards to fluid obtained from thoracocentesis and medication changes of her furosemide dose.  Spoke of possible discharge within 24 hours based on current clinical evaluation and dependent on tomorrow's clinical evaluation.  She understood and appreciated the update.    Total Time Spent on Date of Encounter in care of patient: 40 mins. This time was spent on one or more of the following: performing physical exam; counseling and coordination of care; obtaining or reviewing history; documenting in the medical record; reviewing/ordering tests, medications or procedures; communicating with other healthcare professionals and discussing with patient's family/caregivers.    Current Length of Stay: 2 day(s)  Current Patient Status: Inpatient   Certification Statement: The patient will continue to require additional inpatient hospital stay due to continued workup of her Syncopal episode  Discharge Plan: Anticipate discharge tomorrow to home with home services.    Code Status: Level 3 - DNAR and DNI    Subjective:   Patient endorses feeling well today; only complaint is in regards to her cough.  Denies headache, vomit, diarrhea, chest pain, shortness of breath, loss of smell  Endorsed Nausea, pain on coughing.    Objective:     Vitals:   Temp (24hrs), Av.8 °F (36.6 °C), Min:97.7 °F (36.5 °C), Max:97.9 °F (36.6 °C)    Temp:  [97.7 °F (36.5 °C)-97.9 °F (36.6 °C)] 97.7 °F (36.5 °C)  HR:  [53-89] 89  Resp:  [14-16] 14  BP: (114-135)/(67-94) 129/78  SpO2:  [94 %-99 %] 98 %  Body mass index is 29.08 kg/m².     Input and Output Summary  (last 24 hours):     Intake/Output Summary (Last 24 hours) at 7/16/2024 1508  Last data filed at 7/16/2024 1130  Gross per 24 hour   Intake 1050 ml   Output 100 ml   Net 950 ml       Physical Exam:   Physical Exam  Vitals and nursing note reviewed.   Constitutional:       General: She is not in acute distress.     Appearance: She is well-developed.   HENT:      Head: Normocephalic and atraumatic.   Eyes:      Conjunctiva/sclera: Conjunctivae normal.   Cardiovascular:      Rate and Rhythm: Normal rate. Rhythm irregular.      Heart sounds: No murmur heard.  Pulmonary:      Effort: Pulmonary effort is normal. No respiratory distress.      Comments: Non-productive cough; Decreased breath sounds right lower base  Abdominal:      Palpations: Abdomen is soft.      Tenderness: There is no abdominal tenderness. There is no guarding.   Musculoskeletal:         General: No swelling.      Cervical back: Neck supple.   Skin:     General: Skin is warm and dry.      Capillary Refill: Capillary refill takes less than 2 seconds.   Neurological:      Mental Status: She is alert.   Psychiatric:         Mood and Affect: Mood normal.          Additional Data:     Labs:  Results from last 7 days   Lab Units 07/13/24  0628 07/13/24  0625   WBC Thousand/uL  --  7.98   HEMOGLOBIN g/dL  --  13.1   I STAT HEMOGLOBIN g/dl 13.3  --    HEMATOCRIT %  --  40.0   HEMATOCRIT, ISTAT % 39  --    PLATELETS Thousands/uL  --  281   SEGS PCT %  --  66   LYMPHO PCT %  --  20   MONO PCT %  --  11   EOS PCT %  --  2     Results from last 7 days   Lab Units 07/16/24  0508 07/15/24  1124   SODIUM mmol/L 131* 131*   POTASSIUM mmol/L 4.1 4.2   CHLORIDE mmol/L 101 99   CO2 mmol/L 22 24   BUN mg/dL 25 23   CREATININE mg/dL 0.90 0.91   ANION GAP mmol/L 8 8   CALCIUM mg/dL 9.0 9.4   ALBUMIN g/dL  --  4.0   TOTAL BILIRUBIN mg/dL  --  1.31*   ALK PHOS U/L  --  69   ALT U/L  --  36   AST U/L  --  26   GLUCOSE RANDOM mg/dL 144* 114         Results from last 7 days   Lab  Units 07/16/24  1133 07/16/24  0722 07/15/24  1456 07/15/24  1159 07/15/24  0709 07/14/24  2053 07/14/24  1605 07/14/24  1034 07/14/24  0726 07/13/24  2102 07/11/24  1405   POC GLUCOSE mg/dl 173* 135 265* 104 139 199* 144* 229* 118 212* 183*     Results from last 7 days   Lab Units 07/13/24  0625   HEMOGLOBIN A1C % 8.8*           Lines/Drains:  Invasive Devices       Peripheral Intravenous Line  Duration             Peripheral IV 07/13/24 Right Antecubital 3 days                          Imaging: Reviewed radiology reports from this admission including: chest xray Shows reduction of fluid after thoracocentesis.     Recent Cultures (last 7 days):   Results from last 7 days   Lab Units 07/15/24  1055   GRAM STAIN RESULT  Rare Mononuclear Cells  No bacteria seen   BODY FLUID CULTURE, STERILE  No growth       Last 24 Hours Medication List:   Current Facility-Administered Medications   Medication Dose Route Frequency Provider Last Rate    acetaminophen  650 mg Oral Q8H PRN Sean Foster MD      acetylcysteine  3 mL Nebulization Daily Mya Quinn MD      albuterol  2 puff Inhalation Q6H PRN Sean Foster MD      apixaban  5 mg Oral BID Betsy Srivastava MD      ascorbic acid  500 mg Oral Daily Betsy Srivastava MD      benzonatate  100 mg Oral TID PRN Betsy Srivastava MD      diltiazem  120 mg Oral Daily JENNY Sapp      ezetimibe  10 mg Oral HS Betsy Srivastava MD      famotidine  10 mg Oral Daily Betsy Srivastava MD      flecainide  150 mg Oral Q12H [facility] Kwabena Dey MD      [START ON 7/17/2024] furosemide  40 mg Oral Daily JENNY Sapp      gabapentin  300 mg Oral HS Betsy Srivastava MD      guaiFENesin  600 mg Oral Q12H [facility] Sean Foster MD      insulin lispro  1-6 Units Subcutaneous TID AC Kwabena Dey MD      levalbuterol  1.25 mg Nebulization TID Addison Purvis MD      metoprolol succinate  100 mg Oral Q12H Kwabena Dey MD      rOPINIRole  2 mg Oral HS Betsy  PILY Srivastava MD      umeclidinium  1 puff Inhalation Daily Betsy Srivastava MD      zolpidem  10 mg Oral HS PRN Betsy Srivastava MD          Today, Patient Was Seen By: Kwabena Dey MD    **Please Note: This note may have been constructed using a voice recognition system.**

## 2024-07-17 VITALS
RESPIRATION RATE: 17 BRPM | WEIGHT: 159 LBS | OXYGEN SATURATION: 98 % | HEIGHT: 62 IN | SYSTOLIC BLOOD PRESSURE: 111 MMHG | TEMPERATURE: 97.4 F | BODY MASS INDEX: 29.26 KG/M2 | DIASTOLIC BLOOD PRESSURE: 67 MMHG | HEART RATE: 80 BPM

## 2024-07-17 LAB
ANION GAP SERPL CALCULATED.3IONS-SCNC: 6 MMOL/L (ref 4–13)
BUN SERPL-MCNC: 24 MG/DL (ref 5–25)
CALCIUM SERPL-MCNC: 9.1 MG/DL (ref 8.4–10.2)
CHLORIDE SERPL-SCNC: 101 MMOL/L (ref 96–108)
CO2 SERPL-SCNC: 27 MMOL/L (ref 21–32)
CREAT SERPL-MCNC: 0.96 MG/DL (ref 0.6–1.3)
GFR SERPL CREATININE-BSD FRML MDRD: 56 ML/MIN/1.73SQ M
GLUCOSE SERPL-MCNC: 126 MG/DL (ref 65–140)
GLUCOSE SERPL-MCNC: 144 MG/DL (ref 65–140)
GLUCOSE SERPL-MCNC: 215 MG/DL (ref 65–140)
GLUCOSE SERPL-MCNC: 248 MG/DL (ref 65–140)
POTASSIUM SERPL-SCNC: 4.3 MMOL/L (ref 3.5–5.3)
SODIUM SERPL-SCNC: 134 MMOL/L (ref 135–147)

## 2024-07-17 PROCEDURE — 82948 REAGENT STRIP/BLOOD GLUCOSE: CPT

## 2024-07-17 PROCEDURE — 80048 BASIC METABOLIC PNL TOTAL CA: CPT

## 2024-07-17 PROCEDURE — 94640 AIRWAY INHALATION TREATMENT: CPT

## 2024-07-17 PROCEDURE — 94760 N-INVAS EAR/PLS OXIMETRY 1: CPT

## 2024-07-17 PROCEDURE — 99238 HOSP IP/OBS DSCHRG MGMT 30/<: CPT | Performed by: INTERNAL MEDICINE

## 2024-07-17 RX ORDER — FUROSEMIDE 20 MG/1
40 TABLET ORAL DAILY
Qty: 60 TABLET | Refills: 0 | Status: SHIPPED | OUTPATIENT
Start: 2024-07-17 | End: 2024-07-29

## 2024-07-17 RX ADMIN — UMECLIDINIUM 1 PUFF: 62.5 AEROSOL, POWDER ORAL at 08:29

## 2024-07-17 RX ADMIN — ACETYLCYSTEINE 600 MG: 200 SOLUTION ORAL; RESPIRATORY (INHALATION) at 08:54

## 2024-07-17 RX ADMIN — FAMOTIDINE 10 MG: 20 TABLET ORAL at 08:22

## 2024-07-17 RX ADMIN — FUROSEMIDE 40 MG: 40 TABLET ORAL at 08:23

## 2024-07-17 RX ADMIN — DILTIAZEM HYDROCHLORIDE 120 MG: 120 CAPSULE, COATED, EXTENDED RELEASE ORAL at 08:22

## 2024-07-17 RX ADMIN — APIXABAN 5 MG: 5 TABLET, FILM COATED ORAL at 08:22

## 2024-07-17 RX ADMIN — METOPROLOL SUCCINATE 100 MG: 100 TABLET, EXTENDED RELEASE ORAL at 08:23

## 2024-07-17 RX ADMIN — OXYCODONE HYDROCHLORIDE AND ACETAMINOPHEN 500 MG: 500 TABLET ORAL at 08:23

## 2024-07-17 RX ADMIN — INSULIN LISPRO 2 UNITS: 100 INJECTION, SOLUTION INTRAVENOUS; SUBCUTANEOUS at 12:40

## 2024-07-17 RX ADMIN — FLECAINIDE ACETATE 150 MG: 50 TABLET ORAL at 08:23

## 2024-07-17 RX ADMIN — LEVALBUTEROL HYDROCHLORIDE 1.25 MG: 1.25 SOLUTION RESPIRATORY (INHALATION) at 08:54

## 2024-07-17 RX ADMIN — GUAIFENESIN 600 MG: 600 TABLET ORAL at 08:22

## 2024-07-17 NOTE — ASSESSMENT & PLAN NOTE
Patient admitted for fall due to unknown cause.  Patient has multiple risk factors such as heart conditions; endorsed dehydration; history of urinary tract infections.  Patient has sick sinus with pacemaker with possible dysfunction; consider cardiac etiology  Patient appears hypovolemic on admission  CT Head unremarkable for acute cranial etiology.  Multifactorial-orthostatic hypotension as per reported by daughter vs cardiac etiology with arrhythmia, bradycardia, new onset RV failure vs vasovagal  Per cardiology consultation:  Unlikely to be cardiac related  Orthostatic pressure is negative    PLAN  Cardiology consult: Cleared from cardio; outpatient follow up arranged.  Encourage PO intake  OT:  Level 3.  F/U outpatient OT for fitness to drive evaluation.  Discharge home with instructions to follow up with PCP in 1 week; cardiology to follow up with patient for HF clinic.  Given instructions to monitor weight and if noticing a 3lb increase to contact her cardiologist.

## 2024-07-17 NOTE — DISCHARGE SUMMARY
[facility]  Discharge- Farnaz Martin 1945, 78 y.o. female MRN: [id number]  Unit/Bed#: S -01 Encounter: [id number]  Primary Care Provider: Naveen Monsivais MD   Date and time admitted to hospital: 7/13/2024  6:10 AM    * Syncope and collapse  Assessment & Plan  Patient admitted for fall due to unknown cause.  Patient has multiple risk factors such as heart conditions; endorsed dehydration; history of urinary tract infections.  Patient has sick sinus with pacemaker with possible dysfunction; consider cardiac etiology  Patient appears hypovolemic on admission  CT Head unremarkable for acute cranial etiology.  Multifactorial-orthostatic hypotension as per reported by daughter vs cardiac etiology with arrhythmia, bradycardia, new onset RV failure vs vasovagal  Per cardiology consultation:  Unlikely to be cardiac related  Orthostatic pressure is negative    PLAN  Cardiology consult: Cleared from cardio; outpatient follow up arranged.  Encourage PO intake  OT:  Level 3.  F/U outpatient OT for fitness to drive evaluation.  Discharge home with instructions to follow up with PCP in 1 week; cardiology to follow up with patient for HF clinic.  Given instructions to monitor weight and if noticing a 3lb increase to contact her cardiologist.      Paroxysmal atrial fibrillation (HCC)  Assessment & Plan  Patient with history of Atrial fibrillation, with PPM  EKG absent P-waves; Wide QRS waves; Electronically paced    PLAN  Eliquis 5 mg BID  Continue flecainide 150 mg twice daily and Toprol- mg twice daily  Continue diltiazem 120 mg daily     Chronic heart failure with preserved ejection fraction (HCC)  Assessment & Plan  Wt Readings from Last 3 Encounters:   07/17/24 72.1 kg (159 lb)   07/11/24 73.7 kg (162 lb 8 oz)   06/17/24 69.9 kg (154 lb)     ECHO: 7/14  EF 50%. Systolic function is mildly reduced. There is mild global hypokinesis.   Right ventricular volume overload  Moderately  elevated right ventricular systolic pressure: 50.00 mmHg.     ECHO 5/25/2024  EF 55%; Moderate to severe Mitral regurgitation  CT: Mild global cardiomegaly. Dilated hepatic IVC and hepatic veins suggesting possible right heart regurgitation- concerning for RV failure.  Patient appears Euvolemic on admission.  Fluid collection from thoracocentesis is unremarkable transudate fluid    PLAN  Daily standing weight, I/O's.  C/w Lasix to 40 mg    Pleural effusion, right  Assessment & Plan  S/P thoracocentesis  Fluid results largely unremarkable.  On admission, CT CAP with contrast: New large dependent right pleural effusion with associated atelectatic changes. Small left effusion.  In the setting of bronchiectasis, pulmonary hypertension and possibly new onset RV failure.    PLAN  Lasix 40mg daily      Type 2 diabetes mellitus with microalbuminuria, without long-term current use of insulin ([facility])  Assessment & Plan  Lab Results   Component Value Date    HGBA1C 8.8 (H) 07/13/2024       Recent Labs     07/16/24  2102 07/17/24  0725 07/17/24  1029 07/17/24  1135   POCGLU 191* 126 248* 215*     PLAN  Hold Farxiga  Insulin sliding scale  Diabetic diet  Monitor blood glucose      Hyponatremia  Assessment & Plan  Sodium   Date Value Ref Range Status   07/17/2024 134 (L) 135 - 147 mmol/L Final   08/01/2023 130 (L) 135 - 145 mmol/L Final   05/06/2015 136 136 - 145 mmol/L Final     SODIUM, I-STAT   Date Value Ref Range Status   07/13/2024 129 (L) 136 - 145 mmol/l Final     Improved with IV saline   Per patient daughter; has poor oral intake    PLAN  Encourage p.o. intake.    Insomnia  Assessment & Plan  Well controlled on home regimen    PLAN  Continue home medication Ambien 10mg PRN    Hyperlipidemia  Assessment & Plan  Triglyceride 63; LDL 64; HDL 46; Cholesterol 123    PLAN  Continue Ezetemibe 10mg    Essential hypertension  Assessment & Plan  Blood Pressure: 111/67     PLAN  Monitor BP.  Meds per A-fib      Medical Problems        Resolved Problems  Date Reviewed: 7/14/2024   None       Discharging Resident: Kwabena Dey MD  Discharging Attending: No att. providers found  PCP: Naveen Monsivais MD  Admission Date:   Admission Orders (From admission, onward)       Ordered        07/14/24 1336  INPATIENT ADMISSION  Once            07/13/24 1539  Place in Observation  Once                          Discharge Date: 07/17/24    Consultations During Hospital Stay:  Cardiology, Pulmonology    Procedures Performed:   Thoracocentesis    Significant Findings / Test Results:   Thoracocentesis fluid result Transudate    Incidental Findings:   None     Test Results Pending at Discharge (will require follow up):  None     Outpatient Tests Requested:  None    Complications:  None    Reason for Admission: Syncopal episode with fall    Hospital Course:   Farnaz Martin is a 78 y.o. female patient who originally presented to the hospital on 7/13/2024 due to syncopal episode with fall and confusion post fall.  Patient alert and oriented on examination and throughout hospital stay. During hospital encounter workup for neurological and cardiological causes ruled out.  Patient orthostatic pressures are negative.  Per cardiology recommendation her lasix is increased from 20 to 40.  Patient stable for discharge home with home health aide.  Patient to follow up with Cardiology, Pulmonology for her history of heart failure.  Patient to follow up with her primary care provider within  1 week for continued management.    Please see above list of diagnoses and related plan for additional information.     Condition at Discharge: fair    Discharge Day Visit / Exam:   Subjective:  Patient endorses feeling better than on admission.  Denies headache, nausea, vomiting, chest pain, shortness of breath, diarrhea, constipation  Vitals: Blood Pressure: 111/67 (07/17/24 1135)  Pulse: 80 (07/17/24 1135)  Temperature: (!) 97.4 °F (36.3 °C) (07/17/24 0716)  Temp Source: Oral  "(07/16/24 1133)  Respirations: 17 (07/17/24 0716)  Height: 5' 2\" (157.5 cm) (07/14/24 1315)  Weight - Scale: 72.1 kg (159 lb) (07/17/24 0900)  SpO2: 98 % (07/17/24 1135)  Exam:   Physical Exam  Vitals and nursing note reviewed.   Constitutional:       General: She is not in acute distress.     Appearance: She is well-developed.   HENT:      Head: Normocephalic and atraumatic.   Eyes:      Conjunctiva/sclera: Conjunctivae normal.   Cardiovascular:      Rate and Rhythm: Normal rate. Rhythm irregular.      Heart sounds: No murmur heard.  Pulmonary:      Effort: Pulmonary effort is normal. No respiratory distress.      Comments: Decreased lung sound in right lung base.      Abdominal:      Palpations: Abdomen is soft.      Tenderness: There is no abdominal tenderness.   Musculoskeletal:         General: No swelling.      Cervical back: Neck supple.   Skin:     General: Skin is warm and dry.      Capillary Refill: Capillary refill takes less than 2 seconds.   Neurological:      Mental Status: She is alert.   Psychiatric:         Mood and Affect: Mood normal.         Discussion with Family: Updated  (daughter) via phone.Spoke in regards to patient discharge and change to her lasix medication.  Informed that patient is to follow up with Cardiology and Pulmonology within several days.  She understood the update and was appreciative.     Discharge instructions/Information to patient and family:   See after visit summary for information provided to patient and family.      Provisions for Follow-Up Care:  See after visit summary for information related to follow-up care and any pertinent home health orders.      Mobility at time of Discharge:   Basic Mobility Inpatient Raw Score: 20  JH-HLM Goal: 6: Walk 10 steps or more  JH-HLM Achieved: 6: Walk 10 steps or more  HLM Goal achieved. Continue to encourage appropriate mobility.     Disposition:   Home with home health care    Planned Readmission: No    Discharge " Medications:  See after visit summary for reconciled discharge medications provided to patient and/or family.      **Please Note: This note may have been constructed using a voice recognition system**

## 2024-07-17 NOTE — DISCHARGE INSTR - AVS FIRST PAGE
Dear Farnaz Martin,     It was our pleasure to care for you here at [facility].  It is our hope that we were always able to exceed the expected standards for your care during your stay.  You were hospitalized due to Syncope.  You were cared for on the 2nd floor by Kwabena Dey MD under the service of Kai Jacome MD with the [facility] who covers for your primary care physician (PCP), Naveen Monsivais MD, while you were hospitalized.  If you have any questions or concerns related to this hospitalization, you may contact us at .  For follow up as well as any medication refills, we recommend that you follow up with your primary care physician.  A registered nurse will reach out to you by phone within a few days after your discharge to answer any additional questions that you may have after going home.  However, at this time we provide for you here, the most important instructions / recommendations at discharge:     Notable Medication Adjustments -   Your Furosemide was increased to 40 mg daily; please take new dosage starting tomorrow.  Stop taking Mucomyst on discharge.  No other medication changes have been made.  Testing Required after Discharge -   None  Important follow up information -   Please follow up with Cardiology outpatient.  Call to make an appointment.  Please follow-up with pulmonary medicine outpatient.  Please follow up with your primary care provider in 1 week.  Other Instructions -   Please weigh yourself daily if you gain more than 2 pounds in 2 consecutive days or more than 5 pounds over 24 hours, contact your cardiologist.  Please restrict your sodium intake to 2 g daily.  If you experience syncope, chest pain, worsening of symptoms please return back to the emergency department.  Please review this entire after visit summary as additional general instructions including medication list, appointments, activity,  diet, any pertinent wound care, and other additional recommendations from your care team that may be provided for you.      Sincerely,     Kwabena Dey MD

## 2024-07-17 NOTE — ASSESSMENT & PLAN NOTE
Lab Results   Component Value Date    HGBA1C 8.8 (H) 07/13/2024       Recent Labs     07/16/24  2102 07/17/24  0725 07/17/24  1029 07/17/24  1135   POCGLU 191* 126 248* 215*     PLAN  Hold Farxiga  Insulin sliding scale  Diabetic diet  Monitor blood glucose

## 2024-07-17 NOTE — PLAN OF CARE
Problem: PAIN - ADULT  Goal: Verbalizes/displays adequate comfort level or baseline comfort level  Description: Interventions:  - Encourage patient to monitor pain and request assistance  - Assess pain using appropriate pain scale  - Administer analgesics based on type and severity of pain and evaluate response  - Implement non-pharmacological measures as appropriate and evaluate response  - Consider cultural and social influences on pain and pain management  - Notify physician/advanced practitioner if interventions unsuccessful or patient reports new pain  Outcome: Progressing     Problem: INFECTION - ADULT  Goal: Absence or prevention of progression during hospitalization  Description: INTERVENTIONS:  - Assess and monitor for signs and symptoms of infection  - Monitor lab/diagnostic results  - Monitor all insertion sites, i.e. indwelling lines, tubes, and drains  - Monitor endotracheal if appropriate and nasal secretions for changes in amount and color  - [place] appropriate cooling/warming therapies per order  - Administer medications as ordered  - Instruct and encourage patient and family to use good hand hygiene technique  - Identify and instruct in appropriate isolation precautions for identified infection/condition  Outcome: Progressing  Goal: Absence of fever/infection during neutropenic period  Description: INTERVENTIONS:  - Monitor WBC    Outcome: Progressing

## 2024-07-17 NOTE — ASSESSMENT & PLAN NOTE
S/P thoracocentesis  Fluid results largely unremarkable.  On admission, CT CAP with contrast: New large dependent right pleural effusion with associated atelectatic changes. Small left effusion.  In the setting of bronchiectasis, pulmonary hypertension and possibly new onset RV failure.    PLAN  Lasix 40mg daily

## 2024-07-17 NOTE — ASSESSMENT & PLAN NOTE
Sodium   Date Value Ref Range Status   07/17/2024 134 (L) 135 - 147 mmol/L Final   08/01/2023 130 (L) 135 - 145 mmol/L Final   05/06/2015 136 136 - 145 mmol/L Final     SODIUM, I-STAT   Date Value Ref Range Status   07/13/2024 129 (L) 136 - 145 mmol/l Final     Improved with IV saline   Per patient daughter; has poor oral intake    PLAN  Encourage p.o. intake.

## 2024-07-17 NOTE — ASSESSMENT & PLAN NOTE
Wt Readings from Last 3 Encounters:   07/17/24 72.1 kg (159 lb)   07/11/24 73.7 kg (162 lb 8 oz)   06/17/24 69.9 kg (154 lb)     ECHO: 7/14  EF 50%. Systolic function is mildly reduced. There is mild global hypokinesis.   Right ventricular volume overload  Moderately elevated right ventricular systolic pressure: 50.00 mmHg.     ECHO 5/25/2024  EF 55%; Moderate to severe Mitral regurgitation  CT: Mild global cardiomegaly. Dilated hepatic IVC and hepatic veins suggesting possible right heart regurgitation- concerning for RV failure.  Patient appears Euvolemic on admission.  Fluid collection from thoracocentesis is unremarkable transudate fluid    PLAN  Daily standing weight, I/O's.  C/w Lasix to 40 mg

## 2024-07-17 NOTE — CASE MANAGEMENT
Case Management Discharge Planning Note    Patient name Farnaz Martin  Location S /S -01 MRN [id number]  : 1945 Date 2024       Current Admission Date: 2024  Current Admission Diagnosis:Syncope and collapse   Patient Active Problem List    Diagnosis Date Noted Date Diagnosed    Pleural effusion, right 2024     Syncope and collapse 2024     Fall 2024     Type 2 diabetes mellitus with microalbuminuria, without long-term current use of insulin (HCC) 2024     Paroxysmal atrial fibrillation (HCC) 2024     Ambulatory dysfunction 2024     Hyponatremia 2024     Left renal mass 2024     Chronic cough 2024     Bronchiectasis without complication ([facility]) 2024     Multiple thyroid nodules 2024     Abnormal CT of the chest 2023     S/P revision of total knee, left 2023    Nonrheumatic mitral valve regurgitation 2023     Meningioma ([facility]) 2022     Chronic tension-type headache, not intractable 2022     Vasomotor rhinitis 2021     MGUS (monoclonal gammopathy of unknown significance) 2021     Hurthle cell adenoma of thyroid 2021     Tremors of nervous system 2021     Hx of diplopia 2021     S/P placement of cardiac pacemaker 2021     History of sick sinus syndrome s/p PPM 2021     Current use of long term anticoagulation 2021     Malignant neoplasm of thyroid gland ([facility]) 2021     Chronic heart failure with preserved ejection fraction ([facility]) 2021     Chronic rhinitis 11/10/2020     Arthritis of both acromioclavicular joints 2020     Carpal tunnel syndrome on right 2019     Osteoarthritis of shoulder      TMJ syndrome 10/18/2017     Essential hypertension 2016     Peripheral neuropathy 2016     Lumbar spondylosis 2015     Lumbar stenosis 2015     Restless legs syndrome 2014     Allergic rhinitis  04/01/2013     Osteoarthritis of knee 04/01/2013     Insomnia 01/04/2013     Osteopenia 11/26/2012     Fibromyalgia 10/16/2012     Hyperlipidemia 10/16/2012     Osteoarthrosis, hand 10/16/2012     Vitamin D deficiency 10/16/2012       LOS (days): 3  Geometric Mean LOS (GMLOS) (days): 4.4  Days to GMLOS:1.5     OBJECTIVE:  Risk of Unplanned Readmission Score: 24.08         Current admission status: Inpatient   Preferred Pharmacy:   [id number] - [place] - [address] [place] [address]  [place] [id number]  Phone: [phone] Fax: [phone]    Primary Care Provider: Naveen Monsivais MD    Primary Insurance: MEDICARE  Secondary Insurance: [facility]    DISCHARGE DETAILS:    IMM Given (Date):: 07/17/24  IMM Given to:: Patient (copy provided)  IMM reviewed with patient, patient agrees with discharge determination.     Additional Comments: AVS and F2F uploaded to [employer]. Patient to d/c home today. No further CM needs.

## 2024-07-18 ENCOUNTER — TELEPHONE (OUTPATIENT)
Dept: FAMILY MEDICINE CLINIC | Facility: CLINIC | Age: 79
End: 2024-07-18

## 2024-07-18 ENCOUNTER — TELEPHONE (OUTPATIENT)
Age: 79
End: 2024-07-18

## 2024-07-18 ENCOUNTER — TRANSITIONAL CARE MANAGEMENT (OUTPATIENT)
Dept: FAMILY MEDICINE CLINIC | Facility: CLINIC | Age: 79
End: 2024-07-18

## 2024-07-18 LAB
BACTERIA SPEC BFLD CULT: NO GROWTH
GRAM STN SPEC: NORMAL
GRAM STN SPEC: NORMAL

## 2024-07-18 PROCEDURE — 88112 CYTOPATH CELL ENHANCE TECH: CPT | Performed by: PATHOLOGY

## 2024-07-18 PROCEDURE — 88342 IMHCHEM/IMCYTCHM 1ST ANTB: CPT | Performed by: PATHOLOGY

## 2024-07-18 PROCEDURE — 88305 TISSUE EXAM BY PATHOLOGIST: CPT | Performed by: PATHOLOGY

## 2024-07-18 PROCEDURE — 88341 IMHCHEM/IMCYTCHM EA ADD ANTB: CPT | Performed by: PATHOLOGY

## 2024-07-18 NOTE — TELEPHONE ENCOUNTER
Incoming call by patient's daughter:    Several questions regarding medications.  All questions answered.

## 2024-07-19 ENCOUNTER — CLINICAL SUPPORT (OUTPATIENT)
Dept: INTERNAL MEDICINE CLINIC | Facility: CLINIC | Age: 79
End: 2024-07-19

## 2024-07-19 ENCOUNTER — TELEPHONE (OUTPATIENT)
Dept: FAMILY MEDICINE CLINIC | Facility: CLINIC | Age: 79
End: 2024-07-19

## 2024-07-19 DIAGNOSIS — R80.9 TYPE 2 DIABETES MELLITUS WITH MICROALBUMINURIA, WITHOUT LONG-TERM CURRENT USE OF INSULIN (HCC): Primary | ICD-10-CM

## 2024-07-19 DIAGNOSIS — E11.29 TYPE 2 DIABETES MELLITUS WITH MICROALBUMINURIA, WITHOUT LONG-TERM CURRENT USE OF INSULIN (HCC): Primary | ICD-10-CM

## 2024-07-19 LAB
DME PARACHUTE DELIVERY DATE REQUESTED: NORMAL
DME PARACHUTE ITEM DESCRIPTION: NORMAL
DME PARACHUTE ITEM DESCRIPTION: NORMAL
DME PARACHUTE ORDER STATUS: NORMAL
DME PARACHUTE SUPPLIER NAME: NORMAL
DME PARACHUTE SUPPLIER PHONE: NORMAL

## 2024-07-19 NOTE — PROGRESS NOTES
[facility] Clinical Pharmacy Services  Zulema Monsivais, Pharmacist    Assessment/ Plan     1. Type 2 diabetes mellitus with microalbuminuria, without long-term current use of insulin ([facility])  Assessment & Plan:    Lab Results   Component Value Date    HGBA1C 8.8 (H) 07/13/2024     Education/training provided on:     • Sensor site selection, rotation and sensor insertion   • Attachment of the transmitter to the sensor, if applicable   • Taping/securing of the sensor/transmitter, if applicable   • Connection of the transmitter to the , if applicable   • Difference between interstitial glucose readings and blood glucose readings   • Understanding CGM data and trends   • Possible interference of products such as acetaminophen, salicylic acid, hydroxyurea, and high dose vitamin C   • Calibration including timing, frequency and importance of accurate meter/fingerstick technique, if applicable   • Education to prevent overcorrection of high glucose   • Treatment of hypoglycemia    Sending order for Dexcom G7 to DME  [facility] needs Medicare DWO for glucometer and strips (lancets only faxed 7/14/24)  Referral to  Care management for investigation for PAP or PACE - Farxiga, Lantus, and Eliquis  Approx annual income 48K with half from spousal [employer] benefits  Call Coral recinos - [phone]          Orders:  -     Ambulatory Referral to Social Work Care Management Program; Future      Follow-up: 2 weeks, PCP next week for TCM appt    Subjective   Instructed on CGM and insulin use.     Medication Adherence/ Tolerability/ Cost:  Patient denies side effects, no issues with cost, 0 missed doses in last two week  albuterol  apixaban  ascorbic Acid Cpcr  benzonatate  Cholecalciferol Caps  CHROMIUM PICOLATE PO  dapagliflozin Tabs  diltiazem  ezetimibe  famotidine  flecainide  furosemide  gabapentin  Insulin Glargine Solostar Sopn  Insulin Pen Needle Misc  ipratropium  metoprolol succinate  OneTouch Delica Lancets 33G  Misc  OneTouch Verio Strp  predniSONE  rOPINIRole  sodium chloride  Spiriva Respimat Aers  TURMERIC PO  zolpidem  ZyrTEC Allergy Caps      2. Lifestyle:   Last visit with dietician: NO  Previously attended Living Well with Diabetes Class: NO  Diet Recall: not eating much right now, recent hospitalization for CHF  Breakfast:   Lunch:   Dinner:   Snacks:   Beverages:   Physical Activity: walking, gardening    3. Home monitoring devices  Glucometer: Yes, Brand: onetouch verio reflect  Continuous Glucose Monitor: No, Brand: working on Dexcom G7 authorization  Blood Pressure monitor: No, Brand: n/a    Hypoglycemia: The patient had 0 episodes/symptoms of hypoglycemia.   Hyperglycemia: The patient had 0 symptoms of hyperglycemia.     Objective       Blood Glucose Readings  CGM Report scanned into chart -- in future visits, no CGM data today  The patient is currently checking blood glucose 0 times per day. Patient does not report with SMBG logs.    Date AM Post-Breakfast Lunch Post-Lunch Dinner Post-Dinner Comments                                                                                                                           Avg                ASCVD Risk:  The ASCVD Risk score (Casie DAVIS, et al., 2019) failed to calculate for the following reasons:    The valid total cholesterol range is 130 to 320 mg/dL     Vitals:  There were no vitals filed for this visit.    Labs:    Lab Results   Component Value Date    SODIUM 134 (L) 07/17/2024    K 4.3 07/17/2024    EGFR 56 07/17/2024    CREATININE 0.96 07/17/2024    GLUF 123 (H) 07/14/2024    [id number] 799 07/01/2022    MICROALBCRE 47 (H) 01/09/2024         Telemedicine consent  The patient was identified by name and date of birth. Farnaz CARRILLO Mario was informed that this is a telemedicine visit and that the visit is being conducted through the Epic Embedded platform. She agrees to proceed..  My office door was closed. No one else was in the room.  She acknowledged consent  and understanding of privacy and security of the video platform. The patient has agreed to participate and understands they can discontinue the visit at any time.    Pharmacist Tracking Tool     Pharmacist Tracking Tool  Reason For Outreach: Embedded Pharmacist  Demographics:  Intervention Method: Video  Type of Intervention: New  Topics Addressed: Diabetes  Pharmacologic Interventions: Medication Initiation, Prevent or Manage PILY, and Med Rec  Non-Pharmacologic Interventions: Care coordination, Chart update, Cost, Disease state education, Home Monitoring, Labs, Medication Monitoring, Medication/Device education, and Personal CGM  Time:  Direct Patient Care:  60  mins  Care Coordination:  30  mins  Recommendation Recipient: Patient/Caregiver  Outcome: Accepted

## 2024-07-19 NOTE — ASSESSMENT & PLAN NOTE
Lab Results   Component Value Date    HGBA1C 8.8 (H) 07/13/2024     Education/training provided on:      Sensor site selection, rotation and sensor insertion    Attachment of the transmitter to the sensor, if applicable    Taping/securing of the sensor/transmitter, if applicable    Connection of the transmitter to the , if applicable    Difference between interstitial glucose readings and blood glucose readings    Understanding CGM data and trends    Possible interference of products such as acetaminophen, salicylic acid, hydroxyurea, and high dose vitamin C    Calibration including timing, frequency and importance of accurate meter/fingerstick technique, if applicable    Education to prevent overcorrection of high glucose    Treatment of hypoglycemia    Sending order for Dexcom G7 to DME  [facility] needs Medicare DWO for glucometer and strips (lancets only faxed 7/14/24)  Referral to  Care management for investigation for PAP or PACE - Farxiga, Lantus, and Eliquis  Approx annual income 48K with half from spousal [employer] benefits  Call Coral recinos - [phone]

## 2024-07-19 NOTE — TELEPHONE ENCOUNTER
Faxed [facility] Medicare part B Rx request forms Fax# [phone]. Scanned into media 7/19/24

## 2024-07-21 LAB
ATRIAL RATE: 0 BPM
ATRIAL RATE: 25 BPM
ATRIAL RATE: 67 BPM
ATRIAL RATE: 73 BPM
ATRIAL RATE: 79 BPM
P AXIS: -28 DEGREES
PR INTERVAL: 312 MS
PR INTERVAL: 328 MS
QRS AXIS: -36 DEGREES
QRS AXIS: 140 DEGREES
QRS AXIS: 45 DEGREES
QRS AXIS: 48 DEGREES
QRS AXIS: 50 DEGREES
QRSD INTERVAL: 140 MS
QRSD INTERVAL: 166 MS
QRSD INTERVAL: 74 MS
QRSD INTERVAL: 78 MS
QRSD INTERVAL: 80 MS
QT INTERVAL: 258 MS
QT INTERVAL: 260 MS
QT INTERVAL: 262 MS
QT INTERVAL: 298 MS
QT INTERVAL: 560 MS
QTC INTERVAL: 284 MS
QTC INTERVAL: 288 MS
QTC INTERVAL: 298 MS
QTC INTERVAL: 298 MS
QTC INTERVAL: 642 MS
T WAVE AXIS: -60 DEGREES
T WAVE AXIS: 158 DEGREES
T WAVE AXIS: 210 DEGREES
T WAVE AXIS: 211 DEGREES
T WAVE AXIS: 250 DEGREES
VENTRICULAR RATE: 60 BPM
VENTRICULAR RATE: 73 BPM
VENTRICULAR RATE: 73 BPM
VENTRICULAR RATE: 79 BPM
VENTRICULAR RATE: 79 BPM

## 2024-07-21 PROCEDURE — 93010 ELECTROCARDIOGRAM REPORT: CPT | Performed by: INTERNAL MEDICINE

## 2024-07-22 ENCOUNTER — PATIENT OUTREACH (OUTPATIENT)
Dept: CASE MANAGEMENT | Facility: OTHER | Age: 79
End: 2024-07-22

## 2024-07-22 ENCOUNTER — APPOINTMENT (OUTPATIENT)
Dept: LAB | Age: 79
End: 2024-07-22
Payer: MEDICARE

## 2024-07-22 ENCOUNTER — OFFICE VISIT (OUTPATIENT)
Dept: CARDIOLOGY CLINIC | Facility: CLINIC | Age: 79
End: 2024-07-22
Payer: MEDICARE

## 2024-07-22 ENCOUNTER — APPOINTMENT (OUTPATIENT)
Dept: RADIOLOGY | Age: 79
End: 2024-07-22
Payer: MEDICARE

## 2024-07-22 VITALS
HEIGHT: 62 IN | SYSTOLIC BLOOD PRESSURE: 106 MMHG | HEART RATE: 73 BPM | BODY MASS INDEX: 29.4 KG/M2 | OXYGEN SATURATION: 98 % | WEIGHT: 159.8 LBS | DIASTOLIC BLOOD PRESSURE: 58 MMHG

## 2024-07-22 DIAGNOSIS — I48.92 ATRIAL FIBRILLATION/FLUTTER (HCC): ICD-10-CM

## 2024-07-22 DIAGNOSIS — I50.32 CHRONIC HEART FAILURE WITH PRESERVED EJECTION FRACTION (HCC): Primary | ICD-10-CM

## 2024-07-22 DIAGNOSIS — I10 ESSENTIAL HYPERTENSION: ICD-10-CM

## 2024-07-22 DIAGNOSIS — J90 PLEURAL EFFUSION: ICD-10-CM

## 2024-07-22 DIAGNOSIS — N18.32 STAGE 3B CHRONIC KIDNEY DISEASE (HCC): ICD-10-CM

## 2024-07-22 DIAGNOSIS — I48.0 PAROXYSMAL ATRIAL FIBRILLATION (HCC): ICD-10-CM

## 2024-07-22 DIAGNOSIS — I48.91 ATRIAL FIBRILLATION/FLUTTER (HCC): ICD-10-CM

## 2024-07-22 DIAGNOSIS — Z09 HOSPITAL DISCHARGE FOLLOW-UP: ICD-10-CM

## 2024-07-22 PROCEDURE — 71046 X-RAY EXAM CHEST 2 VIEWS: CPT

## 2024-07-22 PROCEDURE — 99214 OFFICE O/P EST MOD 30 MIN: CPT | Performed by: PHYSICIAN ASSISTANT

## 2024-07-22 RX ORDER — METOPROLOL SUCCINATE 25 MG/1
75 TABLET, EXTENDED RELEASE ORAL EVERY 12 HOURS
Qty: 180 TABLET | Refills: 2 | Status: SHIPPED | OUTPATIENT
Start: 2024-07-22 | End: 2024-07-23

## 2024-07-22 NOTE — PROGRESS NOTES
Heart Failure Outpatient Visit    Farnaz aMrtin 78 y.o. female   MRN: [id number]  Encounter: [id number]    Assessment:  Patient Active Problem List    Diagnosis Date Noted    Pleural effusion, right 07/14/2024    Syncope and collapse 07/13/2024    Fall 07/13/2024    Type 2 diabetes mellitus with microalbuminuria, without long-term current use of insulin ([facility]) 07/11/2024    Paroxysmal atrial fibrillation ([facility]) 05/26/2024    Ambulatory dysfunction 05/24/2024    Hyponatremia 05/24/2024    Left renal mass 04/22/2024    Chronic cough 01/25/2024    Bronchiectasis without complication ([facility]) 01/25/2024    Multiple thyroid nodules 01/13/2024    Abnormal CT of the chest 08/04/2023    S/P revision of total knee, left 07/31/2023    Nonrheumatic mitral valve regurgitation 07/03/2023    Meningioma ([facility]) 05/13/2022    Chronic tension-type headache, not intractable 03/29/2022    Vasomotor rhinitis 12/16/2021    MGUS (monoclonal gammopathy of unknown significance) 06/28/2021    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    Tremors of nervous system 04/01/2021    Hx of diplopia 04/01/2021    S/P placement of cardiac pacemaker 03/24/2021    History of sick sinus syndrome s/p PPM 03/2021    Current use of long term anticoagulation 02/04/2021    Malignant neoplasm of thyroid gland ([facility]) 01/11/2021    Chronic heart failure with preserved ejection fraction ([facility]) 01/03/2021    Chronic rhinitis 11/10/2020    Arthritis of both acromioclavicular joints 03/06/2020    Carpal tunnel syndrome on right 11/01/2019    Osteoarthritis of shoulder     TMJ syndrome 10/18/2017    Essential hypertension 04/19/2016    Peripheral neuropathy 03/07/2016    Lumbar spondylosis 06/29/2015    Lumbar stenosis 06/29/2015    Restless legs syndrome 07/09/2014    Allergic rhinitis 04/01/2013    Osteoarthritis of knee 04/01/2013    Insomnia 01/04/2013    Osteopenia 11/26/2012    Fibromyalgia 10/16/2012    Hyperlipidemia 10/16/2012    Osteoarthrosis, hand 10/16/2012  "   Vitamin D deficiency 10/16/2012       Today's Plan:  Reasonable volume status on exam.  Ongoing fatigue. Metoprolol dosing decreased to 75 mg BID.   2g sodium restriction, 2L fluid restriction, daily weights.   CXR ordered to reeval lung fields given reduced breath sounds in the area of recent thoracentesis.  RTC 2 weeks.    Plan:  Chronic HFpEF; LVEF 50%  TTE 05/25/2024: \"LVEF 55%, mod to severe MR, mild to mod TR, est RAP 10 mmHg.\"   TTE 7/14/24: LVEF 50%. Mild global hypokinesis. RV dilated. LA dilated. Mild MR, moderate TR.                  Weight of 160 lbs on 05/29, 154 lbs. Today, weighs 159 lbs.      Pharmacotherapies:  --Aldosterone Antagonist: No.   --SGLT2 Inhibitor: dapagliflozin 5 mg daily.   --Diuretic: Lasix 40 mg daily.      Atrial fibrillation / flutter               [id number] = 5 (age, sex, HF, HTN).              Anticoagulation on Eliquis.               S/p ablation in 03/2021.               Rate control: diltiazem 120 mg BID and metoprolol succinate 100 mg BID.               Rhythm control: flecainide 100 mg BID.     Hypertension              BP of 106/58 mmHg in office today.              Continue on medications as above.     History of torsades susan pointes: in setting of QT-prolongation with sotalol s/p DCCV during ablation.  Renal mass, left  Bronchiectasis  History of sick sinus syndrome: s/p DC PPM in 03/2021.  History of thyroid cancer  Pleural effusions  S/p right thoracentesis, consistent with transudate likely secondary to heart failure     HPI:   Farnaz Martin is a 78-year-old woman with a PMH as above who presents to the office for follow-up. Follows with Dr. Cunningham.      05/29/2024 with TH: \"78-year-old female with past medical history described above who presents for hospital follow-up with her daughter.  He was originally admitted on 5/20 at the [facility] with palpitations due to atrial tachycardia.  She was placed on flecainide 150 mg twice daily.  She then presented " "back to the [facility] after a fall at home. Also felt to be volume overloaded and diagnosed with bronchiectasis.  He was treated with IV diuretics and steroids respectively.  Device interrogation showed her to be in normal sinus rhythm with no significant events.  She was discharged home on her usual dose of Lasix 20 mg daily.  Her flecainide dose was also reduced to 100 mg twice daily given concern for side effects on higher dosing. She is feeling well with exception of fatigue. Had a few break through palpitations since discharge but nothing sustained. Otherwise no other concerns or symptoms. She will likely need surgical intervention of a renal mass over next few weeks to months and will need cardiac risk assessment.\"     06/17/2024: Patient presents for follow-up, and patient's daughter, Coral, on speaker phone throughout visit. Patient continues with intermittent palpitations lasting a few minutes at a time with no associated symptoms. Denies lightheadedness, CP, diaphoresis, SOB at rest, PND, and orthopnea. Reports some lower BP readings at home (SBP in low 90-110s).     She presented to the ED on 7/13/2024 after a fall or possible syncopal event while attempting to use the bathroom.  She has poor recall of the events surrounding her fall.  She believes she was on the ground for approximately 6 hours before help arrived. Traumatic imaging negative for acute injury.  CXR showed mild interstitial edema and moderate right and small left pleural effusion.  CT chest showed right pleural effusion, global cardiomegaly, right greater than left with dilated hepatic veins and IVC suggesting right heart failure.  She was noted to have an KIMBERLEY for which she received IV fluids with improvement in her sodium and creatinine today.  High-sensitivity troponins negative.  Total CK 68.  There were concerns for possible bradycardia however device interrogation shows normal device function.  Cardiology consulted for further " evaluation management of her possible syncopal event. Underwent thoracentesis of right pleural effusion. Lasix dose increased to 40 mg QD prior to discharge.     7/22/24: presents today for follow up with her daughter. No cardiac complaints today, but reports ongoing fatigue. Denies chest pain, palpitations, leg swelling, JOHNSON.    Past Medical History:   Diagnosis Date    Actinic keratosis     last assessed - [id number]    Arthritis     Atrial fibrillation with rapid ventricular response (HCC)     last assessed - 26Apr2016    Atrial fibrillation with rapid ventricular response (HCC) 04/19/2016    Basal cell carcinoma     Benign colon polyp     last assessed - [id number]    Bronchiectasis without complication (HCC)     CHF (congestive heart failure) (HCC) 06/2022    Chronic diastolic CHF (congestive heart failure) (HCC)     Disease of thyroid gland     Effusion of knee joint right     Resolved - 19Apr2016    Esophageal reflux     Fibromyalgia     last assessed - [id number]    Fibromyalgia, primary     GERD (gastroesophageal reflux disease)     Hyperlipidemia     Hypertension     Hyponatremia     Malignant neoplasm of thyroid gland (HCC)     Meningioma (HCC)     MGUS (monoclonal gammopathy of unknown significance)     Palpitations     last assessed - 30Apr2013    Peroneal tendonitis, right     last assessed - [id number]    Right lumbar radiculopathy 03/17/2016    Thyroid cancer (HCC)     Thyroid nodule     Trochanteric bursitis of left hip 03/09/2018     Review of Systems   Constitutional:  Positive for activity change and fatigue. Negative for chills and fever.   HENT:  Negative for ear pain and sore throat.    Eyes:  Negative for pain and visual disturbance.   Respiratory:  Negative for cough and shortness of breath.    Cardiovascular:  Negative for chest pain, palpitations and leg swelling.   Gastrointestinal:  Negative for abdominal pain and vomiting.   Genitourinary:  Negative for dysuria and hematuria.    Musculoskeletal:  Negative for arthralgias and back pain.   Skin:  Negative for color change and rash.   Neurological:  Negative for seizures and syncope.   All other systems reviewed and are negative.  14-point ROS completed and negative except as stated above and/or in the HPI.      Allergies   Allergen Reactions    Sotalol Other (See Comments)     Prolonged QTc leading to torsades de pointes     Penicillins Other (See Comments)     As a child calcium deposit in the arm     Ace Inhibitors GI Intolerance     Did feel well on it    Omeprazole Abdominal Pain, Rash and Vomiting     stomach pain, vomiting, rash       Current Outpatient Medications:     albuterol (ProAir HFA) 90 mcg/act inhaler, Inhale 2 puffs every 6 (six) hours as needed for wheezing, Disp: 8.5 g, Rfl: 3    apixaban (ELIQUIS) 5 mg, Take 1 tablet (5 mg total) by mouth 2 (two) times a day, Disp: 60 tablet, Rfl: 3    ascorbic Acid (VITAMIN C) 500 MG CPCR, Take 500 mg by mouth daily, Disp: , Rfl:     benzonatate (TESSALON PERLES) 100 mg capsule, Take 1 capsule (100 mg total) by mouth 3 (three) times a day as needed for cough, Disp: 20 capsule, Rfl: 0    Cetirizine HCl (ZyrTEC Allergy) 10 MG CAPS, Take 10 mg by mouth in the morning, Disp: , Rfl:     Cholecalciferol 50 MCG (2000 UT) CAPS, Take 2,000 Units by mouth 2 (two) times a day, Disp: , Rfl:     Chromium Picolinate (CHROMIUM PICOLATE PO), Take 1 tablet by mouth daily, Disp: , Rfl:     dapagliflozin 5 MG TABS, Take 1 tablet (5 mg total) by mouth daily, Disp: 30 tablet, Rfl: 5    diltiazem (CARDIZEM CD) 120 mg 24 hr capsule, Take 120 mg by mouth daily, Disp: , Rfl:     ezetimibe (ZETIA) 10 mg tablet, Take 1 tablet (10 mg total) by mouth daily at bedtime, Disp: 30 tablet, Rfl: 0    famotidine (PEPCID) 20 mg tablet, Take 20 mg by mouth 2 (two) times a day M, W, F in the AM, Disp: , Rfl:     flecainide (TAMBOCOR) 100 mg tablet, Take 1 tablet (100 mg total) by mouth 2 (two) times a day (Patient taking  differently: Take 150 mg by mouth 2 (two) times a day), Disp: 60 tablet, Rfl: 5    furosemide (LASIX) 20 mg tablet, Take 2 tablets (40 mg total) by mouth daily take 2 tablet by mouth daily if needed for shortness of breath WEIGHT GAIN 3 LBS IN 1 DAY OR LOWER LEG SWELLING, Disp: 60 tablet, Rfl: 0    gabapentin (NEURONTIN) 300 mg capsule, take 1 capsule by mouth at bedtime, Disp: 90 capsule, Rfl: 1    glucose blood (OneTouch Verio) test strip, Check blood sugars once daily. Please substitute with appropriate alternative as covered by patient's insurance. Dx: E11.65, Disp: 100 each, Rfl: 3    Insulin Glargine Solostar (Lantus SoloStar) 100 UNIT/ML SOPN, Inject 0.1 mL (10 Units total) under the skin daily at bedtime, Disp: 15 mL, Rfl: 1    Insulin Pen Needle 31G X 4 MM MISC, Use daily at bedtime, Disp: 100 each, Rfl: 2    ipratropium (ATROVENT) 0.03 % nasal spray, 2 sprays into each nostril 3 (three) times a day Begin once per day, then progress to twice per day. Progress to three times a day as tolerated. (Patient taking differently: 2 sprays into each nostril if needed Begin once per day, then progress to twice per day. Progress to three times a day as tolerated.), Disp: 90 mL, Rfl: 3    metoprolol succinate (TOPROL-XL) 100 mg 24 hr tablet, Take 1 tablet (100 mg total) by mouth every 12 (twelve) hours, Disp: 180 tablet, Rfl: 3    OneTouch Delica Lancets 33G MISC, Check blood sugars once daily. Please substitute with appropriate alternative as covered by patient's insurance. Dx: E11.65, Disp: 100 each, Rfl: 3    rOPINIRole (REQUIP) 1 mg tablet, Take 2 tablets (2 mg total) by mouth daily at bedtime, Disp: 180 tablet, Rfl: 1    sodium chloride 3 % inhalation solution, Take 4 mL by nebulization as needed for cough, Disp: 152 mL, Rfl: 2    tiotropium (Spiriva Respimat) 2.5 MCG/ACT AERS inhaler, Inhale 2 puffs daily, Disp: 4 g, Rfl: 2    TURMERIC PO, Take 1 capsule by mouth 2 (two) times a day, Disp: , Rfl:     zolpidem  (AMBIEN) 10 mg tablet, Take 1 tablet (10 mg total) by mouth daily at bedtime as needed for sleep, Disp: 90 tablet, Rfl: 1    predniSONE 10 mg tablet, , Disp: , Rfl:     Social History     Socioeconomic History    Marital status:      Spouse name: Not on file    Number of children: Not on file    Years of education: Not on file    Highest education level: Not on file   Occupational History    Not on file   Tobacco Use    Smoking status: Never    Smokeless tobacco: Never   Vaping Use    Vaping status: Never Used   Substance and Sexual Activity    Alcohol use: Not Currently    Drug use: Never    Sexual activity: Not Currently   Other Topics Concern    Not on file   Social History Narrative    ** Merged History Encounter **          Social Determinants of Health     Financial Resource Strain: Patient Unable To Answer (12/19/2023)    Overall Financial Resource Strain (CARDIA)     Difficulty of Paying Living Expenses: Patient unable to answer   Food Insecurity: No Food Insecurity (7/15/2024)    Hunger Vital Sign     Worried About Running Out of Food in the Last Year: Never true     Ran Out of Food in the Last Year: Never true   Transportation Needs: No Transportation Needs (7/15/2024)    PRAPARE - Transportation     Lack of Transportation (Medical): No     Lack of Transportation (Non-Medical): No   Physical Activity: Not on file   Stress: Not on file   Social Connections: Not on file   Intimate Partner Violence: Not At Risk (7/31/2023)    [facility] from [facility], [facility]    Humiliation, Afraid, Rape, and Kick questionnaire     Fear of Current or Ex-Partner: No     Emotionally Abused: No     Physically Abused: No     Sexually Abused: No   Housing Stability: Low Risk  (7/15/2024)    Housing Stability Vital Sign     Unable to Pay for Housing in the Last Year: No     Number of Times Moved in the Last Year: 0     Homeless in the Last Year: No     Family History   Problem Relation  Age of Onset    Heart disease Mother     Diabetes Mother     Heart disease Father     Coronary artery disease Father     Stroke Father         cerebrovascular accident    Heart attack Father         myocardial infarction    Sudden death Father         scd    Other Family         Back disorder    Coronary artery disease Family     Neuropathy Family     Osteoporosis Family     No Known Problems Daughter     No Known Problems Maternal Grandmother     No Known Problems Maternal Grandfather     No Known Problems Paternal Grandmother     No Known Problems Paternal Grandfather     Cancer Maternal Uncle     Breast cancer Maternal Aunt 65    No Known Problems Son     No Known Problems Maternal Aunt     No Known Problems Maternal Aunt     No Known Problems Maternal Aunt     No Known Problems Paternal Aunt     No Known Problems Paternal Aunt     Anuerysm Neg Hx     Clotting disorder Neg Hx     Arrhythmia Neg Hx     Heart failure Neg Hx        Vitals:  Last menstrual period 02/01/1990, not currently breastfeeding.  There is no height or weight on file to calculate BMI.  Wt Readings from Last 10 Encounters:   07/17/24 72.1 kg (159 lb)   07/11/24 73.7 kg (162 lb 8 oz)   06/17/24 69.9 kg (154 lb)   06/14/24 68.9 kg (152 lb)   06/11/24 67.6 kg (149 lb)   05/31/24 71.4 kg (157 lb 8 oz)   05/29/24 72.7 kg (160 lb 3.2 oz)   05/27/24 72.2 kg (159 lb 2.8 oz)   05/22/24 72.8 kg (160 lb 7.9 oz)   05/13/24 71.2 kg (157 lb)     There were no vitals filed for this visit.    Physical Exam  Constitutional:       Appearance: Normal appearance.   HENT:      Head: Normocephalic.      Nose: Nose normal.      Mouth/Throat:      Mouth: Mucous membranes are moist.   Eyes:      Conjunctiva/sclera: Conjunctivae normal.   Neck:      Vascular: No JVD.   Cardiovascular:      Rate and Rhythm: Normal rate and regular rhythm.   Pulmonary:      Effort: Pulmonary effort is normal.      Breath sounds: Examination of the right-lower field reveals decreased  breath sounds. Decreased breath sounds present.   Musculoskeletal:      Cervical back: Neck supple.      Right lower leg: No edema.      Left lower leg: No edema.   Skin:     General: Skin is warm and dry.   Neurological:      General: No focal deficit present.      Mental Status: She is alert and oriented to person, place, and time.   Psychiatric:         Mood and Affect: Mood normal.         Behavior: Behavior normal.         Labs & Results:  Lab Results   Component Value Date    WBC 7.98 07/13/2024    HGB 13.3 07/13/2024    HCT 39 07/13/2024    MCV 92 07/13/2024     07/13/2024     Lab Results   Component Value Date    SODIUM 134 (L) 07/17/2024    K 4.3 07/17/2024     07/17/2024    CO2 27 07/17/2024    BUN 24 07/17/2024    CREATININE 0.96 07/17/2024    GLUC 144 (H) 07/17/2024    CALCIUM 9.1 07/17/2024     Lab Results   Component Value Date    INR 1.62 (H) 05/24/2024    INR 1.21 (H) 01/22/2024    INR 1.26 (H) 01/05/2024    PROTIME 20.0 (H) 05/24/2024    PROTIME 15.2 (H) 01/22/2024    PROTIME 16.5 (H) 01/05/2024     Lab Results   Component Value Date     (H) 01/22/2024      Lab Results   Component Value Date    NTBNP 761 (H) 01/08/2021          Thank you for the opportunity to participate in the care of this patient.    Elise Ulloa PA-C

## 2024-07-22 NOTE — PROGRESS NOTES
OPCM SW received referral for patient from Pharmacist regarding possible PACE, or assistance with PAPs for Eliquis, Lantus, and Farxiga.  Per referral comments, it was requested that OPCM SW not reach out until the beginning of August.  Chart review completed and will f/u in a couple weeks

## 2024-07-22 NOTE — PATIENT INSTRUCTIONS
Please weigh yourself every day (after emptying your bladder) and keep a detailed log of weights.   Contact the Heart Failure program at [phone] if you gain 3+ lbs overnight or 5+ lbs in 5-7 days.  Limit daily sodium/salt intake to 2000 mg daily to prevent fluid retention.  Avoid canned foods, fast food/Chinese food, and processed meats (hot dogs, lunch meat, and sausage etc.). Caution with condiments.  Limit fluid intake to 2000 mL or 2 liters (about 60-65 ounces) daily.  Avoid electrolyte replacement drinks (such as Gatorade, Pedialyte, Propel, Liquid IV, etc.).  Bring complete list of medications and log of daily weights to your follow-up appointment.    Decrease your metoprolol to 75 mg twice a day.   Consider taking your cardizem in the evening.

## 2024-07-23 ENCOUNTER — TELEPHONE (OUTPATIENT)
Age: 79
End: 2024-07-23

## 2024-07-23 DIAGNOSIS — I48.0 PAROXYSMAL ATRIAL FIBRILLATION (HCC): ICD-10-CM

## 2024-07-23 DIAGNOSIS — I50.32 CHRONIC HEART FAILURE WITH PRESERVED EJECTION FRACTION (HCC): Primary | ICD-10-CM

## 2024-07-23 LAB
DME PARACHUTE DELIVERY DATE ACTUAL: NORMAL
DME PARACHUTE DELIVERY DATE EXPECTED: NORMAL
DME PARACHUTE DELIVERY DATE REQUESTED: NORMAL
DME PARACHUTE ITEM DESCRIPTION: NORMAL
DME PARACHUTE ITEM DESCRIPTION: NORMAL
DME PARACHUTE ORDER STATUS: NORMAL
DME PARACHUTE SUPPLIER NAME: NORMAL
DME PARACHUTE SUPPLIER PHONE: NORMAL

## 2024-07-23 RX ORDER — METOPROLOL SUCCINATE 25 MG/1
75 TABLET, EXTENDED RELEASE ORAL EVERY 12 HOURS
Qty: 540 TABLET | Refills: 2 | Status: SHIPPED | OUTPATIENT
Start: 2024-07-23

## 2024-07-23 NOTE — TELEPHONE ENCOUNTER
Molly at [facility] # [phone] stated she faxed a requisition on 7/15/24 and 7/18/24. Please contact her with an update. Thank you for your help.

## 2024-07-23 NOTE — TELEPHONE ENCOUNTER
Ynes the daughter called would like to discuss chest xray that was done.        Went over Elise Daily, PA-C response with Ynes and verbally understood

## 2024-07-24 ENCOUNTER — HOSPITAL ENCOUNTER (INPATIENT)
Facility: HOSPITAL | Age: 79
LOS: 5 days | Discharge: NURSING, REHAB OR LONG-TERM CARE | DRG: 682 | End: 2024-07-29
Attending: EMERGENCY MEDICINE | Admitting: INTERNAL MEDICINE
Payer: MEDICARE

## 2024-07-24 ENCOUNTER — APPOINTMENT (OUTPATIENT)
Dept: LAB | Facility: MEDICAL CENTER | Age: 79
DRG: 682 | End: 2024-07-24
Payer: MEDICARE

## 2024-07-24 ENCOUNTER — APPOINTMENT (EMERGENCY)
Dept: RADIOLOGY | Facility: HOSPITAL | Age: 79
DRG: 682 | End: 2024-07-24
Payer: MEDICARE

## 2024-07-24 ENCOUNTER — TELEPHONE (OUTPATIENT)
Age: 79
End: 2024-07-24

## 2024-07-24 ENCOUNTER — NURSE TRIAGE (OUTPATIENT)
Age: 79
End: 2024-07-24

## 2024-07-24 DIAGNOSIS — I63.9 STROKE (CEREBRUM) (HCC): ICD-10-CM

## 2024-07-24 DIAGNOSIS — E11.29 TYPE 2 DIABETES MELLITUS WITH MICROALBUMINURIA, WITHOUT LONG-TERM CURRENT USE OF INSULIN (HCC): ICD-10-CM

## 2024-07-24 DIAGNOSIS — R55 SYNCOPE AND COLLAPSE: ICD-10-CM

## 2024-07-24 DIAGNOSIS — I50.32 CHRONIC HEART FAILURE WITH PRESERVED EJECTION FRACTION (HCC): Chronic | ICD-10-CM

## 2024-07-24 DIAGNOSIS — I50.32 CHRONIC HEART FAILURE WITH PRESERVED EJECTION FRACTION (HCC): ICD-10-CM

## 2024-07-24 DIAGNOSIS — I50.30 (HFPEF) HEART FAILURE WITH PRESERVED EJECTION FRACTION (HCC): ICD-10-CM

## 2024-07-24 DIAGNOSIS — I48.0 PAROXYSMAL ATRIAL FIBRILLATION (HCC): Chronic | ICD-10-CM

## 2024-07-24 DIAGNOSIS — I48.0 PAROXYSMAL A-FIB (HCC): ICD-10-CM

## 2024-07-24 DIAGNOSIS — R53.1 WEAKNESS: ICD-10-CM

## 2024-07-24 DIAGNOSIS — R80.9 TYPE 2 DIABETES MELLITUS WITH MICROALBUMINURIA, WITHOUT LONG-TERM CURRENT USE OF INSULIN (HCC): ICD-10-CM

## 2024-07-24 DIAGNOSIS — E87.1 HYPONATREMIA: ICD-10-CM

## 2024-07-24 DIAGNOSIS — J90 PLEURAL EFFUSION: ICD-10-CM

## 2024-07-24 DIAGNOSIS — R53.83 OTHER FATIGUE: ICD-10-CM

## 2024-07-24 DIAGNOSIS — R18.8 ASCITES: ICD-10-CM

## 2024-07-24 DIAGNOSIS — N17.9 ACUTE KIDNEY INJURY (HCC): Primary | ICD-10-CM

## 2024-07-24 PROBLEM — I48.91 A-FIB (HCC): Status: ACTIVE | Noted: 2024-05-26

## 2024-07-24 PROBLEM — N18.30 CKD (CHRONIC KIDNEY DISEASE) STAGE 3, GFR 30-59 ML/MIN (HCC): Status: ACTIVE | Noted: 2024-07-24

## 2024-07-24 LAB
2HR DELTA HS TROPONIN: 1 NG/L
4HR DELTA HS TROPONIN: 1 NG/L
ALBUMIN SERPL BCG-MCNC: 3.8 G/DL (ref 3.5–5)
ALP SERPL-CCNC: 71 U/L (ref 34–104)
ALT SERPL W P-5'-P-CCNC: 52 U/L (ref 7–52)
ANION GAP SERPL CALCULATED.3IONS-SCNC: 10 MMOL/L (ref 4–13)
ANION GAP SERPL CALCULATED.3IONS-SCNC: 11 MMOL/L (ref 4–13)
AST SERPL W P-5'-P-CCNC: 46 U/L (ref 13–39)
BASOPHILS # BLD AUTO: 0.05 THOUSANDS/ÂΜL (ref 0–0.1)
BASOPHILS NFR BLD AUTO: 1 % (ref 0–1)
BILIRUB SERPL-MCNC: 0.9 MG/DL (ref 0.2–1)
BNP SERPL-MCNC: 1428 PG/ML (ref 0–100)
BUN SERPL-MCNC: 49 MG/DL (ref 5–25)
BUN SERPL-MCNC: 50 MG/DL (ref 5–25)
CALCIUM SERPL-MCNC: 9 MG/DL (ref 8.4–10.2)
CALCIUM SERPL-MCNC: 9.1 MG/DL (ref 8.4–10.2)
CARDIAC TROPONIN I PNL SERPL HS: 8 NG/L
CARDIAC TROPONIN I PNL SERPL HS: 9 NG/L
CARDIAC TROPONIN I PNL SERPL HS: 9 NG/L
CHLORIDE SERPL-SCNC: 93 MMOL/L (ref 96–108)
CHLORIDE SERPL-SCNC: 93 MMOL/L (ref 96–108)
CO2 SERPL-SCNC: 23 MMOL/L (ref 21–32)
CO2 SERPL-SCNC: 24 MMOL/L (ref 21–32)
CREAT SERPL-MCNC: 1.59 MG/DL (ref 0.6–1.3)
CREAT SERPL-MCNC: 1.64 MG/DL (ref 0.6–1.3)
EOSINOPHIL # BLD AUTO: 0.21 THOUSAND/ÂΜL (ref 0–0.61)
EOSINOPHIL NFR BLD AUTO: 3 % (ref 0–6)
ERYTHROCYTE [DISTWIDTH] IN BLOOD BY AUTOMATED COUNT: 14 % (ref 11.6–15.1)
GFR SERPL CREATININE-BSD FRML MDRD: 29 ML/MIN/1.73SQ M
GFR SERPL CREATININE-BSD FRML MDRD: 30 ML/MIN/1.73SQ M
GLUCOSE P FAST SERPL-MCNC: 138 MG/DL (ref 65–99)
GLUCOSE SERPL-MCNC: 140 MG/DL (ref 65–140)
GLUCOSE SERPL-MCNC: 153 MG/DL (ref 65–140)
HCT VFR BLD AUTO: 36.6 % (ref 34.8–46.1)
HGB BLD-MCNC: 11.8 G/DL (ref 11.5–15.4)
IMM GRANULOCYTES # BLD AUTO: 0.03 THOUSAND/UL (ref 0–0.2)
IMM GRANULOCYTES NFR BLD AUTO: 0 % (ref 0–2)
LYMPHOCYTES # BLD AUTO: 1.49 THOUSANDS/ÂΜL (ref 0.6–4.47)
LYMPHOCYTES NFR BLD AUTO: 20 % (ref 14–44)
MAGNESIUM SERPL-MCNC: 2.3 MG/DL (ref 1.9–2.7)
MCH RBC QN AUTO: 29.5 PG (ref 26.8–34.3)
MCHC RBC AUTO-ENTMCNC: 32.2 G/DL (ref 31.4–37.4)
MCV RBC AUTO: 92 FL (ref 82–98)
MONOCYTES # BLD AUTO: 0.97 THOUSAND/ÂΜL (ref 0.17–1.22)
MONOCYTES NFR BLD AUTO: 13 % (ref 4–12)
NEUTROPHILS # BLD AUTO: 4.73 THOUSANDS/ÂΜL (ref 1.85–7.62)
NEUTS SEG NFR BLD AUTO: 63 % (ref 43–75)
NRBC BLD AUTO-RTO: 0 /100 WBCS
OSMOLALITY UR/SERPL-RTO: 296 MMOL/KG (ref 282–298)
PLATELET # BLD AUTO: 282 THOUSANDS/UL (ref 149–390)
PMV BLD AUTO: 9.9 FL (ref 8.9–12.7)
POTASSIUM SERPL-SCNC: 4.5 MMOL/L (ref 3.5–5.3)
POTASSIUM SERPL-SCNC: 4.8 MMOL/L (ref 3.5–5.3)
PROT SERPL-MCNC: 6.5 G/DL (ref 6.4–8.4)
RBC # BLD AUTO: 4 MILLION/UL (ref 3.81–5.12)
SODIUM SERPL-SCNC: 126 MMOL/L (ref 135–147)
SODIUM SERPL-SCNC: 128 MMOL/L (ref 135–147)
WBC # BLD AUTO: 7.48 THOUSAND/UL (ref 4.31–10.16)

## 2024-07-24 PROCEDURE — 85025 COMPLETE CBC W/AUTO DIFF WBC: CPT | Performed by: EMERGENCY MEDICINE

## 2024-07-24 PROCEDURE — 36415 COLL VENOUS BLD VENIPUNCTURE: CPT

## 2024-07-24 PROCEDURE — 80048 BASIC METABOLIC PNL TOTAL CA: CPT

## 2024-07-24 PROCEDURE — 99285 EMERGENCY DEPT VISIT HI MDM: CPT

## 2024-07-24 PROCEDURE — 80181 DRUG ASSAY FLECAINIDE: CPT

## 2024-07-24 PROCEDURE — 83735 ASSAY OF MAGNESIUM: CPT

## 2024-07-24 PROCEDURE — 83930 ASSAY OF BLOOD OSMOLALITY: CPT

## 2024-07-24 PROCEDURE — 80053 COMPREHEN METABOLIC PANEL: CPT | Performed by: EMERGENCY MEDICINE

## 2024-07-24 PROCEDURE — 84484 ASSAY OF TROPONIN QUANT: CPT | Performed by: EMERGENCY MEDICINE

## 2024-07-24 PROCEDURE — 82948 REAGENT STRIP/BLOOD GLUCOSE: CPT

## 2024-07-24 PROCEDURE — 71045 X-RAY EXAM CHEST 1 VIEW: CPT

## 2024-07-24 PROCEDURE — 99285 EMERGENCY DEPT VISIT HI MDM: CPT | Performed by: EMERGENCY MEDICINE

## 2024-07-24 PROCEDURE — 83880 ASSAY OF NATRIURETIC PEPTIDE: CPT

## 2024-07-24 PROCEDURE — 99223 1ST HOSP IP/OBS HIGH 75: CPT | Performed by: INTERNAL MEDICINE

## 2024-07-24 PROCEDURE — 93005 ELECTROCARDIOGRAM TRACING: CPT

## 2024-07-24 RX ORDER — DILTIAZEM HYDROCHLORIDE 120 MG/1
120 CAPSULE, COATED, EXTENDED RELEASE ORAL DAILY
Status: DISCONTINUED | OUTPATIENT
Start: 2024-07-25 | End: 2024-07-29 | Stop reason: HOSPADM

## 2024-07-24 RX ORDER — LIDOCAINE 50 MG/G
1 PATCH TOPICAL DAILY
Status: DISCONTINUED | OUTPATIENT
Start: 2024-07-25 | End: 2024-07-29 | Stop reason: HOSPADM

## 2024-07-24 RX ORDER — FUROSEMIDE 10 MG/ML
40 INJECTION INTRAMUSCULAR; INTRAVENOUS ONCE
Status: DISCONTINUED | OUTPATIENT
Start: 2024-07-24 | End: 2024-07-25

## 2024-07-24 RX ORDER — AMOXICILLIN 250 MG
1 CAPSULE ORAL
Status: DISCONTINUED | OUTPATIENT
Start: 2024-07-24 | End: 2024-07-29 | Stop reason: HOSPADM

## 2024-07-24 RX ORDER — ROPINIROLE 1 MG/1
2 TABLET, FILM COATED ORAL
Status: DISCONTINUED | OUTPATIENT
Start: 2024-07-24 | End: 2024-07-24

## 2024-07-24 RX ORDER — ASCORBIC ACID 500 MG
500 TABLET ORAL DAILY
Status: DISCONTINUED | OUTPATIENT
Start: 2024-07-25 | End: 2024-07-29 | Stop reason: HOSPADM

## 2024-07-24 RX ORDER — ZOLPIDEM TARTRATE 5 MG/1
10 TABLET ORAL
Status: DISCONTINUED | OUTPATIENT
Start: 2024-07-24 | End: 2024-07-29 | Stop reason: HOSPADM

## 2024-07-24 RX ORDER — INSULIN LISPRO 100 [IU]/ML
1-6 INJECTION, SOLUTION INTRAVENOUS; SUBCUTANEOUS
Status: DISCONTINUED | OUTPATIENT
Start: 2024-07-24 | End: 2024-07-29 | Stop reason: HOSPADM

## 2024-07-24 RX ORDER — DAPAGLIFLOZIN 5 MG/1
5 TABLET, FILM COATED ORAL DAILY
Status: CANCELLED | OUTPATIENT
Start: 2024-07-25

## 2024-07-24 RX ORDER — ACETAMINOPHEN 325 MG/1
975 TABLET ORAL ONCE
Status: COMPLETED | OUTPATIENT
Start: 2024-07-24 | End: 2024-07-24

## 2024-07-24 RX ORDER — INSULIN GLARGINE 100 [IU]/ML
10 INJECTION, SOLUTION SUBCUTANEOUS
Status: DISCONTINUED | OUTPATIENT
Start: 2024-07-24 | End: 2024-07-29 | Stop reason: HOSPADM

## 2024-07-24 RX ORDER — POLYETHYLENE GLYCOL 3350 17 G/17G
17 POWDER, FOR SOLUTION ORAL DAILY
Status: DISCONTINUED | OUTPATIENT
Start: 2024-07-25 | End: 2024-07-29 | Stop reason: HOSPADM

## 2024-07-24 RX ORDER — LORATADINE 10 MG/1
10 TABLET ORAL DAILY
Status: DISCONTINUED | OUTPATIENT
Start: 2024-07-25 | End: 2024-07-29 | Stop reason: HOSPADM

## 2024-07-24 RX ORDER — GABAPENTIN 300 MG/1
300 CAPSULE ORAL
Status: DISCONTINUED | OUTPATIENT
Start: 2024-07-24 | End: 2024-07-29 | Stop reason: HOSPADM

## 2024-07-24 RX ORDER — FLECAINIDE ACETATE 50 MG/1
100 TABLET ORAL 2 TIMES DAILY
Status: DISCONTINUED | OUTPATIENT
Start: 2024-07-24 | End: 2024-07-25

## 2024-07-24 RX ORDER — EZETIMIBE 10 MG/1
10 TABLET ORAL
Status: DISCONTINUED | OUTPATIENT
Start: 2024-07-24 | End: 2024-07-29 | Stop reason: HOSPADM

## 2024-07-24 RX ADMIN — APIXABAN 5 MG: 5 TABLET, FILM COATED ORAL at 20:28

## 2024-07-24 RX ADMIN — INSULIN GLARGINE 10 UNITS: 100 INJECTION, SOLUTION SUBCUTANEOUS at 21:35

## 2024-07-24 RX ADMIN — EZETIMIBE 10 MG: 10 TABLET ORAL at 21:34

## 2024-07-24 RX ADMIN — ACETAMINOPHEN 975 MG: 325 TABLET, FILM COATED ORAL at 19:12

## 2024-07-24 RX ADMIN — ZOLPIDEM TARTRATE 10 MG: 5 TABLET, COATED ORAL at 23:36

## 2024-07-24 RX ADMIN — FLECAINIDE ACETATE 100 MG: 50 TABLET ORAL at 20:51

## 2024-07-24 RX ADMIN — GABAPENTIN 300 MG: 300 CAPSULE ORAL at 21:35

## 2024-07-24 NOTE — ASSESSMENT & PLAN NOTE
POA1.64; (baseline .9-1.1)   Suspect KIMBERLEY secondary to volume overload with  chest x-ray suggestive of increased right sided pleural effusion.  Suspect cardiorenal syndrome     Plan:  1 dose of IV Lasix 40 mg  Hold IV fluid due to pleural effusion  Monitor BMP  Avoid hypoperfusion of the kidneys, minimize nephrotoxins  Consider nephrology consultation if worsening KIMBERLEY

## 2024-07-24 NOTE — H&P
[facility]  H&P  Name: Farnaz Martin 78 y.o. female I MRN: [id number]  Unit/Bed#: ED-16 I Date of Admission: 7/24/2024   Date of Service: 7/24/2024 I Hospital Day: 0      Assessment & Plan   * Acute kidney injury superimposed on CKD (chronic kidney disease) stage 3, GFR 30-59 ml/min ([facility])  Assessment & Plan      POA1.64; (baseline .9-1.1)   Suspect KIMBERLEY secondary to volume overload with  chest x-ray suggestive of increased right sided pleural effusion.  Suspect cardiorenal syndrome     Plan:  1 dose of IV Lasix 40 mg  Hold IV fluid due to pleural effusion  Monitor BMP  Avoid hypoperfusion of the kidneys, minimize nephrotoxins  Consider nephrology consultation if worsening KIMBERLEY      Fatigue  Assessment & Plan  Reports increased fatigue and decrease in appetite starting Monday  Dyspnea on exertion  Sleeps with 2 pillows at night  Presyncopal episode  on the date of admission.  Recently discharge from [facility] for treatment of syncope with collapse  Suspect fatigue to be secondary to deconditioning with recent hospitalization.  Polypharmacy may be playing a role in patient repeated syncope episode    Plan:   PT/OT  Consider geriatric consult      Recurrent pleural effusion on right  Assessment & Plan  Patient with history of right-sided pleural effusion in the setting of bronchiectasis and pulmonary hypertension presents with recurrent right-sided pleural effusion that has increased in size.  Underwent thoracentesis on 7/15/2024 with fluid suggested of transudate by effusion.  Discharge from [facility] on 7/17/2024 for syncope and collapse.  Reported feeling fine until Monday when she started to have increased fatigue.  Visited cardiologist on 7/22/2024 where a repeated chest x-ray was ordered to reevaluate for resolution.  Chest x-ray on 7/22/2024 showed new small right-sided pleural effusion.    Chest x-ray on admission appears to show worsening pleural effusion per my read  Patient reports  taking Lasix 40 mg twice daily after discharge from the hospital.   Suspect recurrent pleural effusion secondary to heart failure.    Plan:  1 dose of IV 40 mg Lasix.  Depending on patient response, can decide to continue diuresis  Repeat BMP, mag and Phos  Cardiology  Continue with home medication    Hyponatremia  Assessment & Plan  Patient with history of chronic hyponatremia, presents on admission with sodium of 128.  Corrected sodium with glucose was 129.  Repeating BMP evident for sodium of 126 correction adding up to 127.    Baseline Sodium between 128-1 34.  BNP elevated at 1,428.  Suspect volume overload worsening chronic hyponatremia    Plan:   Fluid restriction  Continue to monitor BMP  Urine and sodium osmolarity, urine sodium, morning cortisol      Paroxysmal A-fib (HCC)  Assessment & Plan  Afib-  S/p ablation in 03/2021.   EKG-Sinus rhythm with frequent Premature atrial complexes with 1st degree A-V block with frequent ventricular-paced complexes  Low voltage QRS    Plan:  Eliquis 5 mg twice daily  Continue flecainide 150 mg twice daily and metoprolol succinate 75 mg twice daily  Continue diltiazem 120 mg daily    Chronic heart failure with preserved ejection fraction (HCC)  Assessment & Plan  Wt Readings from Last 3 Encounters:   07/22/24 72.5 kg (159 lb 12.8 oz)   07/17/24 72.1 kg (159 lb)   07/11/24 73.7 kg (162 lb 8 oz)   euvolemic on exam.  BNP: elevated at 1,428  TTE 7/14/24: LVEF 50%. Mild global hypokinesis. RV dilated. LA dilated. Mild MR, moderate TR  Cardiac recently.  Increase metoprolol succinate to 75 mg.  Lasix 40 mg reduced to daily    Plan:   Daily standing weight, ED notes   IV Lasix once  with chest x-ray evident for R pleural effusion  Considering heart failure consult for patient repeat hospitalization            Restless legs syndrome  Assessment & Plan  Continue ropinirole    VTE Pharmacologic Prophylaxis: VTE Score: 4 Moderate Risk (Score 3-4) - Pharmacological DVT Prophylaxis  Ordered: apixaban (Eliquis).  Code Status: Level 1 - Full Code   Discussion with family: Updated  (daughter) via phone.    Anticipated Length of Stay: Patient will be admitted on an inpatient basis with an anticipated length of stay of greater than 2 midnights secondary to KIMBERLEY.    Chief Complaint: Generalized fatigue    History of Present Illness:  Farnaz Martin is a 78 y.o. female with a PMH of congestive heart failure with preserved ejection, right-sided pleural effusion, restless leg syndrome, A-fib on Eliquis, and chronic hyponatremia who presents with generalized fatigue.Patient was recently discharged on 7/17/2024 for syncope and collapse.  She reports feeling good until Monday after visiting her cardiologist. She endorsed fatigue, and neck pain.Patient's daughter reported that her blood pressure was ranging 90s/60s. She had decreased in appetite, lightheadedness, sob with exertion or climbing stairs.  She was discharged on Lasix 40 mg twice daily but had it reduced to Lasix 40 daily. She endorsed using 2 pillow to sleep due to acid reflux.     On the day of admission, daughter endorsed that patient had a presyncopal episode where her son-in-law caught her before she landed on the floor.  Her cardiologist recommended repeating chest x-ray to monitor the resolution of the pleural effusion on 7/22/2024.  Repeated chest x-ray showed new small right-sided pleural effusion and complete resolution of the left-sided pleural effusion with decrease atelectasis of the left.          Review of Systems:  Review of Systems   Constitutional:  Positive for fatigue. Negative for chills and fever.   HENT:  Negative for ear pain and sore throat.    Eyes:  Negative for pain and visual disturbance.   Respiratory:  Positive for cough and shortness of breath.    Cardiovascular:  Negative for chest pain and palpitations.   Gastrointestinal:  Negative for abdominal pain and vomiting.   Genitourinary:  Negative for  dysuria and hematuria.   Musculoskeletal:  Negative for arthralgias and back pain.   Skin:  Negative for color change and rash.   Neurological:  Positive for dizziness and light-headedness. Negative for seizures and syncope.   All other systems reviewed and are negative.      Past Medical and Surgical History:   Past Medical History:   Diagnosis Date    Actinic keratosis     last assessed - 06Jun2014    Arthritis     Atrial fibrillation with rapid ventricular response (HCC)     last assessed - 26Apr2016    Atrial fibrillation with rapid ventricular response (HCC) 04/19/2016    Basal cell carcinoma     Benign colon polyp     last assessed - 27Apr2015    Bronchiectasis without complication (HCC)     CHF (congestive heart failure) (HCC) 06/2022    Chronic diastolic CHF (congestive heart failure) (HCC)     Disease of thyroid gland     Effusion of knee joint right     Resolved - 19Apr2016    Esophageal reflux     Fibromyalgia     last assessed - 27Dec2017    Fibromyalgia, primary     GERD (gastroesophageal reflux disease)     Hyperlipidemia     Hypertension     Hyponatremia     Malignant neoplasm of thyroid gland (HCC)     Meningioma (HCC)     MGUS (monoclonal gammopathy of unknown significance)     Palpitations     last assessed - 30Apr2013    Peroneal tendonitis, right     last assessed - [id number]    Right lumbar radiculopathy 03/17/2016    Thyroid cancer (HCC)     Thyroid nodule     Trochanteric bursitis of left hip 03/09/2018       Past Surgical History:   Procedure Laterality Date    CARDIAC ELECTROPHYSIOLOGY STUDY AND ABLATION  08/2022    CATARACT EXTRACTION Bilateral     COLONOSCOPY  03/2018    COLONOSCOPY W/ POLYPECTOMY  2021    repeat in 5 years    EYE SURGERY      OOPHORECTOMY      PA NEUROPLASTY &/TRANSPOS MEDIAN NRV CARPAL TUNNE Right 11/14/2019    Procedure: RELEASE CARPAL TUNNEL;  Surgeon: Onesimo Tate MD;  Location: [facility];  Service: Orthopedics    PA TOTAL THYROID LOBECTOMY UNI W/WO ISTHMUSECTOMY  Left 12/16/2020    Procedure: Left THYROID LOBECTOMY;  Surgeon: Jhony Bhatt MD;  Location: [facility];  Service: ENT    RECTAL POLYPECTOMY      REPLACEMENT TOTAL KNEE Left     last assessed - 27Apr2015    TONSILLECTOMY      TOTAL ABDOMINAL HYSTERECTOMY      TUBAL LIGATION      US GUIDED THYROID BIOPSY  10/14/2020       Meds/Allergies:  Prior to Admission medications    Medication Sig Start Date End Date Taking? Authorizing Provider   albuterol (ProAir HFA) 90 mcg/act inhaler Inhale 2 puffs every 6 (six) hours as needed for wheezing 1/30/24  Yes Anabella Dougherty MD   apixaban (ELIQUIS) 5 mg Take 1 tablet (5 mg total) by mouth 2 (two) times a day 1/17/24  Yes Nir Cunningham DO   ascorbic Acid (VITAMIN C) 500 MG CPCR Take 500 mg by mouth daily   Yes Historical Provider, MD   Cetirizine HCl (ZyrTEC Allergy) 10 MG CAPS Take 10 mg by mouth in the morning   Yes Historical Provider, MD   Cholecalciferol 50 MCG (2000 UT) CAPS Take 2,000 Units by mouth 2 (two) times a day   Yes Historical Provider, MD   Chromium Picolinate (CHROMIUM PICOLATE PO) Take 1 tablet by mouth daily   Yes Historical Provider, MD   dapagliflozin 5 MG TABS Take 1 tablet (5 mg total) by mouth daily 5/31/24 11/27/24 Yes Naveen Monsivais MD   diltiazem (CARDIZEM CD) 120 mg 24 hr capsule Take 120 mg by mouth daily   Yes Historical Provider, MD   ezetimibe (ZETIA) 10 mg tablet Take 1 tablet (10 mg total) by mouth daily at bedtime 5/27/24  Yes Kyle Brunner, MD   famotidine (PEPCID) 20 mg tablet Take 20 mg by mouth 2 (two) times a day M, W, F in the AM   Yes Historical Provider, MD   flecainide (TAMBOCOR) 100 mg tablet Take 1 tablet (100 mg total) by mouth 2 (two) times a day  Patient taking differently: Take 150 mg by mouth 2 (two) times a day 6/28/24  Yes Nir Cunningham DO   furosemide (LASIX) 20 mg tablet Take 2 tablets (40 mg total) by mouth daily take 2 tablet by mouth daily if needed for shortness of breath WEIGHT GAIN 3 LBS IN 1 DAY OR  LOWER LEG SWELLING 7/17/24 8/16/24 Yes Kwabena Dey MD   gabapentin (NEURONTIN) 300 mg capsule take 1 capsule by mouth at bedtime 2/18/24  Yes Naveen Monsivais MD   glucose blood (OneTouch Verio) test strip Check blood sugars once daily. Please substitute with appropriate alternative as covered by patient's insurance. Dx: E11.65 7/11/24  Yes Naveen Monsivais MD   Insulin Glargine Solostar (Lantus SoloStar) 100 UNIT/ML SOPN Inject 0.1 mL (10 Units total) under the skin daily at bedtime 7/11/24 5/7/25 Yes Naveen Monsivais MD   Insulin Pen Needle 31G X 4 MM MISC Use daily at bedtime 7/11/24  Yes Naveen Monsivais MD   metoprolol succinate (TOPROL-XL) 25 mg 24 hr tablet take 3 tablets by mouth every 12 hours 7/23/24  Yes Elise Ulloa PA-C   OneTouch Delica Lancets 33G MISC Check blood sugars once daily. Please substitute with appropriate alternative as covered by patient's insurance. Dx: E11.65 7/11/24  Yes Naveen Monsivais MD   rOPINIRole (REQUIP) 1 mg tablet Take 2 tablets (2 mg total) by mouth daily at bedtime 5/6/24  Yes Apple Lobato MD   sodium chloride 3 % inhalation solution Take 4 mL by nebulization as needed for cough 3/25/24  Yes JENNY Enriquez   tiotropium (Spiriva Respimat) 2.5 MCG/ACT AERS inhaler Inhale 2 puffs daily 6/14/24  Yes JENNY Enriquez   TURMERIC PO Take 1 capsule by mouth 2 (two) times a day   Yes Historical Provider, MD   zolpidem (AMBIEN) 10 mg tablet Take 1 tablet (10 mg total) by mouth daily at bedtime as needed for sleep 1/23/24  Yes Josie Wynn MD   benzonatate (TESSALON PERLES) 100 mg capsule Take 1 capsule (100 mg total) by mouth 3 (three) times a day as needed for cough  Patient not taking: Reported on 7/24/2024 6/15/24   JENNY Enriquez   ipratropium (ATROVENT) 0.03 % nasal spray 2 sprays into each nostril 3 (three) times a day Begin once per day, then progress to twice per day. Progress to three times a day as tolerated.  Patient taking  differently: 2 sprays into each nostril if needed Begin once per day, then progress to twice per day. Progress to three times a day as tolerated. 1/25/24   Anabella Dougherty MD   predniSONE 10 mg tablet  5/27/24   Historical Provider, MD     I have reviewed home medications with patient personally.    Allergies:   Allergies   Allergen Reactions    Sotalol Other (See Comments)     Prolonged QTc leading to torsades de pointes     Penicillins Other (See Comments)     As a child calcium deposit in the arm     Ace Inhibitors GI Intolerance     Did feel well on it    Omeprazole Abdominal Pain, Rash and Vomiting     stomach pain, vomiting, rash       Social History:  Marital Status:    Occupation: N/a  Patient Pre-hospital Living Situation: Home  Patient Pre-hospital Level of Mobility: walks with cane  Patient Pre-hospital Diet Restrictions: Regular  Substance Use History:   Social History     Substance and Sexual Activity   Alcohol Use Not Currently     Social History     Tobacco Use   Smoking Status Never   Smokeless Tobacco Never     Social History     Substance and Sexual Activity   Drug Use Never       Family History:  Family History   Problem Relation Age of Onset    Heart disease Mother     Diabetes Mother     Heart disease Father     Coronary artery disease Father     Stroke Father         cerebrovascular accident    Heart attack Father         myocardial infarction    Sudden death Father         scd    Other Family         Back disorder    Coronary artery disease Family     Neuropathy Family     Osteoporosis Family     No Known Problems Daughter     No Known Problems Maternal Grandmother     No Known Problems Maternal Grandfather     No Known Problems Paternal Grandmother     No Known Problems Paternal Grandfather     Cancer Maternal Uncle     Breast cancer Maternal Aunt 65    No Known Problems Son     No Known Problems Maternal Aunt     No Known Problems Maternal Aunt     No Known Problems Maternal  Aunt     No Known Problems Paternal Aunt     No Known Problems Paternal Aunt     Anuerysm Neg Hx     Clotting disorder Neg Hx     Arrhythmia Neg Hx     Heart failure Neg Hx        Physical Exam:     Vitals:   Blood Pressure: 105/66 (07/24/24 1930)  Pulse: 61 (07/24/24 1930)  Temperature: 97.8 °F (36.6 °C) (07/24/24 1546)  Temp Source: Oral (07/24/24 1546)  Respirations: 20 (07/24/24 1930)  SpO2: 94 % (07/24/24 1930)    Physical Exam  Vitals and nursing note reviewed.   Constitutional:       General: She is not in acute distress.     Appearance: She is well-developed.   HENT:      Head: Normocephalic and atraumatic.      Mouth/Throat:      Mouth: Mucous membranes are dry.   Eyes:      Conjunctiva/sclera: Conjunctivae normal.   Cardiovascular:      Rate and Rhythm: Normal rate and regular rhythm.      Heart sounds: No murmur heard.  Pulmonary:      Effort: Pulmonary effort is normal. No respiratory distress.      Breath sounds: Normal breath sounds.      Comments: Decreased breath sounds on the right  Abdominal:      General: Abdomen is flat. Bowel sounds are normal.      Palpations: Abdomen is soft.      Tenderness: There is no abdominal tenderness.   Musculoskeletal:         General: No swelling.      Cervical back: Neck supple.      Right lower leg: Edema (trace) present.      Left lower leg: Edema present.   Skin:     General: Skin is warm and dry.      Capillary Refill: Capillary refill takes less than 2 seconds.   Neurological:      Mental Status: She is alert and oriented to person, place, and time.   Psychiatric:         Mood and Affect: Mood normal.          Additional Data:     Lab Results:  Results from last 7 days   Lab Units 07/24/24  1551   WBC Thousand/uL 7.48   HEMOGLOBIN g/dL 11.8   HEMATOCRIT % 36.6   PLATELETS Thousands/uL 282   SEGS PCT % 63   LYMPHO PCT % 20   MONO PCT % 13*   EOS PCT % 3     Results from last 7 days   Lab Units 07/24/24  1551   SODIUM mmol/L 126*   POTASSIUM mmol/L 4.5   CHLORIDE  mmol/L 93*   CO2 mmol/L 23   BUN mg/dL 50*   CREATININE mg/dL 1.64*   ANION GAP mmol/L 10   CALCIUM mg/dL 9.0   ALBUMIN g/dL 3.8   TOTAL BILIRUBIN mg/dL 0.90   ALK PHOS U/L 71   ALT U/L 52   AST U/L 46*   GLUCOSE RANDOM mg/dL 153*             Lab Results   Component Value Date    HGBA1C 8.8 (H) 07/13/2024    HGBA1C 8.8 (H) 06/28/2024    HGBA1C 8.9 (H) 05/07/2024           Lines/Drains:  Invasive Devices       Peripheral Intravenous Line  Duration             Peripheral IV 07/24/24 Right Antecubital <1 day                        Imaging: Reviewed radiology reports from this admission including: chest xray  XR chest 1 view portable   ED Interpretation by Odalis Bronosn MD (07/24 1835)   Worsening right plural effusion, mild pulm vasc congestion          EKG and Other Studies Reviewed on Admission:   EKG: NSR. HR 73.    ** Please Note: This note has been constructed using a voice recognition system. **

## 2024-07-24 NOTE — TELEPHONE ENCOUNTER
Regarding: facial swelling  ----- Message from Katty CLARKE sent at 7/24/2024  1:30 PM EDT -----  Calling to report swelling

## 2024-07-24 NOTE — ASSESSMENT & PLAN NOTE
Wt Readings from Last 3 Encounters:   07/22/24 72.5 kg (159 lb 12.8 oz)   07/17/24 72.1 kg (159 lb)   07/11/24 73.7 kg (162 lb 8 oz)   euvolemic on exam.  BNP: elevated at 1,428  TTE 7/14/24: LVEF 50%. Mild global hypokinesis. RV dilated. LA dilated. Mild MR, moderate TR  Cardiac recently.  Increase metoprolol succinate to 75 mg.  Lasix 40 mg reduced to daily    Plan:   Daily standing weight, ED notes   IV Lasix once  with chest x-ray evident for R pleural effusion  Considering heart failure consult for patient repeat hospitalization

## 2024-07-24 NOTE — ASSESSMENT & PLAN NOTE
Reports increased fatigue and decrease in appetite starting Monday  Dyspnea on exertion  Sleeps with 2 pillows at night  Presyncopal episode  on the date of admission.  Recently discharge from [place] for treatment of syncope with collapse  Suspect fatigue to be secondary to deconditioning with recent hospitalization.  Polypharmacy may be playing a role in patient repeated syncope episode    Plan:   PT/OT  Consider geriatric consult

## 2024-07-24 NOTE — TELEPHONE ENCOUNTER
Received a call from Edward, a nurse with [facility] who is at the patient's home.      Edward states patient is different clinically from when she saw her last. Patient did see Elise Daily in the office 2 days ago.    Nurse states patient has periorbital edema which patient states she woke up with today.   She is very drowsy, doesn't want to open her eyes.  Nurse notes eyelids are very swollen.along with her face mostly around eye.  No increase in edema elsewhere. Patient's BP is 84/56 and feels the patient seems not quite right and feels like she needs ER eval.  Nurse will send the patient to the ED.

## 2024-07-24 NOTE — ASSESSMENT & PLAN NOTE
Afib-  S/p ablation in 03/2021.   EKG-Sinus rhythm with frequent Premature atrial complexes with 1st degree A-V block with frequent ventricular-paced complexes  Low voltage QRS    Plan:  Eliquis 5 mg twice daily  Continue flecainide 150 mg twice daily and metoprolol succinate 75 mg twice daily  Continue diltiazem 120 mg daily

## 2024-07-24 NOTE — ASSESSMENT & PLAN NOTE
Patient with history of chronic hyponatremia, presents on admission with sodium of 128.  Corrected sodium with glucose was 129.  Repeating BMP evident for sodium of 126 correction adding up to 127.    Baseline Sodium between 128-1 34.  BNP elevated at 1,428.  Suspect volume overload worsening chronic hyponatremia    Plan:   Fluid restriction  Continue to monitor BMP  Urine and sodium osmolarity, urine sodium, morning cortisol

## 2024-07-24 NOTE — TELEPHONE ENCOUNTER
"Edward from [facility] calls concerned patient has facial swelling/ severe.  Edward states patient just doesn't seem okay.  She lost her balance and fell, decreased breath sounds and leg swelling.  Patient denies any unusual SOB/ typically has baseline SOB. Denies chest pain, swallowing difficulty,  N/V.  Nurse was also on the phone with cardiology office at same time she was speaking with me.         I recommended ER evaluation now due to facial swelling sudden onset.        Reason for Disposition  • SEVERE swelling of entire face and < 2 hours since exposure to high-risk allergen (e.g., peanuts, tree nuts, fish, shellfish or 1st dose of drug) and no serious symptoms AND [4] no serious allergic reaction in the past    Answer Assessment - Initial Assessment Questions  1. ONSET: \"When did the swelling start?\" (e.g., minutes, hours, days)        Today facial swelling care giver /nurse calls in stating patient does not look ok , both eyes very swollen, decreased breath sounds , swelling in legs, off balance almost fell      2. LOCATION: \"What part of the face is swollen?\"            Both eyes         3. SEVERITY: \"How swollen is it?\"          Severe        4. ITCHING: \"Is there any itching?\" If Yes, ask: \"How much?\"   (Scale 1-10; mild, moderate or severe)          Unsure        5. PAIN: \"Is the swelling painful to touch?\" If Yes, ask: \"How painful is it?\"   (Scale 1-10; mild, moderate or severe)          Unsure         6. FEVER: \"Do you have a fever?\" If Yes, ask: \"What is it, how was it measured, and when did it start?\"            Denies          7. CAUSE: \"What do you think is causing the face swelling?\"            Unsure       8. RECURRENT SYMPTOM: \"Have you had face swelling before?\" If Yes, ask: \"When was the last time?\" \"What happened that time?\"        Denies    Protocols used: Face Swelling-ADULT-[place]    "

## 2024-07-24 NOTE — ASSESSMENT & PLAN NOTE
Patient with history of right-sided pleural effusion in the setting of bronchiectasis and pulmonary hypertension presents with recurrent right-sided pleural effusion that has increased in size.  Underwent thoracentesis on 7/15/2024 with fluid suggested of transudate by effusion.  Discharge from [place] on 7/17/2024 for syncope and collapse.  Reported feeling fine until Monday when she started to have increased fatigue.  Visited cardiologist on 7/22/2024 where a repeated chest x-ray was ordered to reevaluate for resolution.  Chest x-ray on 7/22/2024 showed new small right-sided pleural effusion.    Chest x-ray on admission appears to show worsening pleural effusion per my read  Patient reports taking Lasix 40 mg twice daily after discharge from the hospital.   Suspect recurrent pleural effusion secondary to heart failure.    Plan:  1 dose of IV 40 mg Lasix.  Depending on patient response, can decide to continue diuresis  Repeat BMP, mag and Phos  Cardiology  Continue with home medication

## 2024-07-25 LAB
ANION GAP SERPL CALCULATED.3IONS-SCNC: 9 MMOL/L (ref 4–13)
ATRIAL RATE: 66 BPM
BASOPHILS # BLD AUTO: 0.07 THOUSANDS/ÂΜL (ref 0–0.1)
BASOPHILS NFR BLD AUTO: 1 % (ref 0–1)
BUN SERPL-MCNC: 53 MG/DL (ref 5–25)
BUN SERPL-MCNC: 54 MG/DL (ref 5–25)
BUN SERPL-MCNC: 56 MG/DL (ref 5–25)
CALCIUM SERPL-MCNC: 8.7 MG/DL (ref 8.4–10.2)
CALCIUM SERPL-MCNC: 8.8 MG/DL (ref 8.4–10.2)
CALCIUM SERPL-MCNC: 8.8 MG/DL (ref 8.4–10.2)
CHLORIDE SERPL-SCNC: 93 MMOL/L (ref 96–108)
CHLORIDE SERPL-SCNC: 95 MMOL/L (ref 96–108)
CHLORIDE SERPL-SCNC: 96 MMOL/L (ref 96–108)
CHOLEST SERPL-MCNC: 79 MG/DL
CO2 SERPL-SCNC: 20 MMOL/L (ref 21–32)
CO2 SERPL-SCNC: 22 MMOL/L (ref 21–32)
CO2 SERPL-SCNC: 26 MMOL/L (ref 21–32)
CREAT SERPL-MCNC: 1.57 MG/DL (ref 0.6–1.3)
CREAT SERPL-MCNC: 1.91 MG/DL (ref 0.6–1.3)
CREAT SERPL-MCNC: 1.95 MG/DL (ref 0.6–1.3)
EOSINOPHIL # BLD AUTO: 0.15 THOUSAND/ÂΜL (ref 0–0.61)
EOSINOPHIL NFR BLD AUTO: 2 % (ref 0–6)
ERYTHROCYTE [DISTWIDTH] IN BLOOD BY AUTOMATED COUNT: 13.9 % (ref 11.6–15.1)
GFR SERPL CREATININE-BSD FRML MDRD: 23 ML/MIN/1.73SQ M
GFR SERPL CREATININE-BSD FRML MDRD: 24 ML/MIN/1.73SQ M
GFR SERPL CREATININE-BSD FRML MDRD: 31 ML/MIN/1.73SQ M
GLUCOSE SERPL-MCNC: 112 MG/DL (ref 65–140)
GLUCOSE SERPL-MCNC: 112 MG/DL (ref 65–140)
GLUCOSE SERPL-MCNC: 122 MG/DL (ref 65–140)
GLUCOSE SERPL-MCNC: 143 MG/DL (ref 65–140)
GLUCOSE SERPL-MCNC: 145 MG/DL (ref 65–140)
GLUCOSE SERPL-MCNC: 177 MG/DL (ref 65–140)
GLUCOSE SERPL-MCNC: 182 MG/DL (ref 65–140)
GLUCOSE SERPL-MCNC: 189 MG/DL (ref 65–140)
HCT VFR BLD AUTO: 37.2 % (ref 34.8–46.1)
HDLC SERPL-MCNC: 28 MG/DL
HGB BLD-MCNC: 11.9 G/DL (ref 11.5–15.4)
IMM GRANULOCYTES # BLD AUTO: 0.03 THOUSAND/UL (ref 0–0.2)
IMM GRANULOCYTES NFR BLD AUTO: 0 % (ref 0–2)
LDLC SERPL CALC-MCNC: 40 MG/DL (ref 0–100)
LYMPHOCYTES # BLD AUTO: 1.79 THOUSANDS/ÂΜL (ref 0.6–4.47)
LYMPHOCYTES NFR BLD AUTO: 22 % (ref 14–44)
MAGNESIUM SERPL-MCNC: 2.4 MG/DL (ref 1.9–2.7)
MCH RBC QN AUTO: 29.4 PG (ref 26.8–34.3)
MCHC RBC AUTO-ENTMCNC: 32 G/DL (ref 31.4–37.4)
MCV RBC AUTO: 92 FL (ref 82–98)
MONOCYTES # BLD AUTO: 1.17 THOUSAND/ÂΜL (ref 0.17–1.22)
MONOCYTES NFR BLD AUTO: 14 % (ref 4–12)
NEUTROPHILS # BLD AUTO: 5.09 THOUSANDS/ÂΜL (ref 1.85–7.62)
NEUTS SEG NFR BLD AUTO: 61 % (ref 43–75)
NRBC BLD AUTO-RTO: 0 /100 WBCS
OSMOLALITY UR/SERPL-RTO: 293 MMOL/KG (ref 282–298)
OSMOLALITY UR: 511 MMOL/KG
P AXIS: 93 DEGREES
PHOSPHATE SERPL-MCNC: 4.9 MG/DL (ref 2.3–4.1)
PLATELET # BLD AUTO: 281 THOUSANDS/UL (ref 149–390)
PMV BLD AUTO: 10.1 FL (ref 8.9–12.7)
POTASSIUM SERPL-SCNC: 4.3 MMOL/L (ref 3.5–5.3)
POTASSIUM SERPL-SCNC: 4.5 MMOL/L (ref 3.5–5.3)
POTASSIUM SERPL-SCNC: 4.7 MMOL/L (ref 3.5–5.3)
PR INTERVAL: 472 MS
QRS AXIS: 153 DEGREES
QRSD INTERVAL: 18 MS
QT INTERVAL: 312 MS
QTC INTERVAL: 327 MS
RBC # BLD AUTO: 4.05 MILLION/UL (ref 3.81–5.12)
SODIUM 24H UR-SCNC: 11 MOL/L
SODIUM SERPL-SCNC: 124 MMOL/L (ref 135–147)
SODIUM SERPL-SCNC: 127 MMOL/L (ref 135–147)
SODIUM SERPL-SCNC: 127 MMOL/L (ref 135–147)
SODIUM SERPL-SCNC: 128 MMOL/L (ref 135–147)
T WAVE AXIS: -88 DEGREES
TRIGL SERPL-MCNC: 55 MG/DL
VENTRICULAR RATE: 66 BPM
WBC # BLD AUTO: 8.3 THOUSAND/UL (ref 4.31–10.16)

## 2024-07-25 PROCEDURE — 84300 ASSAY OF URINE SODIUM: CPT

## 2024-07-25 PROCEDURE — 84295 ASSAY OF SERUM SODIUM: CPT

## 2024-07-25 PROCEDURE — 99232 SBSQ HOSP IP/OBS MODERATE 35: CPT | Performed by: INTERNAL MEDICINE

## 2024-07-25 PROCEDURE — 99223 1ST HOSP IP/OBS HIGH 75: CPT | Performed by: INTERNAL MEDICINE

## 2024-07-25 PROCEDURE — 83930 ASSAY OF BLOOD OSMOLALITY: CPT

## 2024-07-25 PROCEDURE — 83735 ASSAY OF MAGNESIUM: CPT

## 2024-07-25 PROCEDURE — 97163 PT EVAL HIGH COMPLEX 45 MIN: CPT

## 2024-07-25 PROCEDURE — 80048 BASIC METABOLIC PNL TOTAL CA: CPT

## 2024-07-25 PROCEDURE — 83935 ASSAY OF URINE OSMOLALITY: CPT

## 2024-07-25 PROCEDURE — 80048 BASIC METABOLIC PNL TOTAL CA: CPT | Performed by: NURSE PRACTITIONER

## 2024-07-25 PROCEDURE — 85025 COMPLETE CBC W/AUTO DIFF WBC: CPT

## 2024-07-25 PROCEDURE — 84100 ASSAY OF PHOSPHORUS: CPT

## 2024-07-25 PROCEDURE — 84165 PROTEIN E-PHORESIS SERUM: CPT

## 2024-07-25 PROCEDURE — 93010 ELECTROCARDIOGRAM REPORT: CPT | Performed by: INTERNAL MEDICINE

## 2024-07-25 PROCEDURE — 80061 LIPID PANEL: CPT

## 2024-07-25 PROCEDURE — 82948 REAGENT STRIP/BLOOD GLUCOSE: CPT

## 2024-07-25 RX ORDER — ACETAMINOPHEN 325 MG/1
650 TABLET ORAL EVERY 6 HOURS PRN
Status: DISCONTINUED | OUTPATIENT
Start: 2024-07-25 | End: 2024-07-29 | Stop reason: HOSPADM

## 2024-07-25 RX ORDER — SODIUM CHLORIDE 9 MG/ML
75 INJECTION, SOLUTION INTRAVENOUS CONTINUOUS
Status: DISPENSED | OUTPATIENT
Start: 2024-07-25 | End: 2024-07-25

## 2024-07-25 RX ORDER — FLECAINIDE ACETATE 50 MG/1
150 TABLET ORAL 2 TIMES DAILY
Status: DISCONTINUED | OUTPATIENT
Start: 2024-07-25 | End: 2024-07-29 | Stop reason: HOSPADM

## 2024-07-25 RX ADMIN — SODIUM CHLORIDE 75 ML/HR: 0.9 INJECTION, SOLUTION INTRAVENOUS at 09:49

## 2024-07-25 RX ADMIN — ACETAMINOPHEN 650 MG: 325 TABLET, FILM COATED ORAL at 23:47

## 2024-07-25 RX ADMIN — LORATADINE 10 MG: 10 TABLET ORAL at 09:49

## 2024-07-25 RX ADMIN — OXYCODONE HYDROCHLORIDE AND ACETAMINOPHEN 500 MG: 500 TABLET ORAL at 09:50

## 2024-07-25 RX ADMIN — ZOLPIDEM TARTRATE 10 MG: 5 TABLET, COATED ORAL at 21:18

## 2024-07-25 RX ADMIN — APIXABAN 5 MG: 5 TABLET, FILM COATED ORAL at 18:50

## 2024-07-25 RX ADMIN — INSULIN GLARGINE 10 UNITS: 100 INJECTION, SOLUTION SUBCUTANEOUS at 21:17

## 2024-07-25 RX ADMIN — INSULIN LISPRO 1 UNITS: 100 INJECTION, SOLUTION INTRAVENOUS; SUBCUTANEOUS at 21:17

## 2024-07-25 RX ADMIN — APIXABAN 5 MG: 5 TABLET, FILM COATED ORAL at 09:50

## 2024-07-25 RX ADMIN — DILTIAZEM HYDROCHLORIDE 120 MG: 120 CAPSULE, COATED, EXTENDED RELEASE ORAL at 09:50

## 2024-07-25 RX ADMIN — FLECAINIDE ACETATE 150 MG: 50 TABLET ORAL at 18:50

## 2024-07-25 RX ADMIN — INSULIN LISPRO 1 UNITS: 100 INJECTION, SOLUTION INTRAVENOUS; SUBCUTANEOUS at 13:27

## 2024-07-25 RX ADMIN — EZETIMIBE 10 MG: 10 TABLET ORAL at 21:18

## 2024-07-25 RX ADMIN — GABAPENTIN 300 MG: 300 CAPSULE ORAL at 21:18

## 2024-07-25 RX ADMIN — METOPROLOL SUCCINATE 75 MG: 50 TABLET, EXTENDED RELEASE ORAL at 18:50

## 2024-07-25 NOTE — CASE MANAGEMENT
Case Management Assessment & Discharge Planning Note    Patient name Farnaz Martin  Location /-01 MRN [id number]  : 1945 Date 2024       Current Admission Date: 2024  Current Admission Diagnosis:Acute kidney injury superimposed on CKD (chronic kidney disease) stage 3, GFR 30-59 ml/min (Formerly [facility])   Patient Active Problem List    Diagnosis Date Noted Date Diagnosed    Acute kidney injury superimposed on CKD (chronic kidney disease) stage 3, GFR 30-59 ml/min (Formerly [facility]) 2024     Fatigue 2024     Recurrent pleural effusion on right 2024     Syncope and collapse 2024     Fall 2024     Type 2 diabetes mellitus with microalbuminuria, without long-term current use of insulin (Formerly [facility]) 2024     Paroxysmal A-fib (Formerly [facility]) 2024     Ambulatory dysfunction 2024     Hyponatremia 2024     Left renal mass 2024     Chronic cough 2024     Bronchiectasis without complication (Formerly [facility]) 2024     Multiple thyroid nodules 2024     Abnormal CT of the chest 2023     S/P revision of total knee, left 2023    Nonrheumatic mitral valve regurgitation 2023     Acute on chronic heart failure (Formerly [facility]) 2022     Meningioma (Formerly [facility]) 2022     Chronic tension-type headache, not intractable 2022     Vasomotor rhinitis 2021     MGUS (monoclonal gammopathy of unknown significance) 2021     Hurthle cell adenoma of thyroid 2021     Tremors of nervous system 2021     Hx of diplopia 2021     S/P placement of cardiac pacemaker 2021     History of sick sinus syndrome s/p PPM 2021     Current use of long term anticoagulation 2021     Malignant neoplasm of thyroid gland (Formerly [facility]) 2021     Chronic heart failure with preserved ejection fraction (Formerly [facility]) 2021     Chronic rhinitis 11/10/2020     Arthritis of both acromioclavicular joints 2020     Carpal tunnel syndrome on right  11/01/2019     Osteoarthritis of shoulder      TMJ syndrome 10/18/2017     Essential hypertension 04/19/2016     Peripheral neuropathy 03/07/2016     Lumbar spondylosis 06/29/2015     Lumbar stenosis 06/29/2015     Restless legs syndrome 07/09/2014     Allergic rhinitis 04/01/2013     Osteoarthritis of knee 04/01/2013     Insomnia 01/04/2013     Osteopenia 11/26/2012     Fibromyalgia 10/16/2012     Hyperlipidemia 10/16/2012     Osteoarthrosis, hand 10/16/2012     Vitamin D deficiency 10/16/2012       LOS (days): 1  Geometric Mean LOS (GMLOS) (days): 3.1  Days to GMLOS:2.4     OBJECTIVE:  PATIENT READMITTED TO HOSPITAL  Risk of Unplanned Readmission Score: 32.35         Current admission status: Inpatient       Preferred Pharmacy:   [id number] - [place] - [address]  [address] [facility]  [place] [address]  Phone: [phone] Fax: [phone]    Primary Care Provider: Naveen Monsivais MD    Primary Insurance: MEDICARE  Secondary Insurance: AAR    ASSESSMENT:  Active Health Care Proxies    There are no active Health Care Proxies on file.                 Readmission Root Cause  30 Day Readmission: Yes  Who directed you to return to the hospital?: Family, [facility]  Did you understand whom to contact if you had questions or problems?: Yes  Did you get your prescriptions before you left the hospital?: Yes  Were you able to get your prescriptions filled when you left the hospital?: Yes  Did you take your medications as prescribed?: Yes  Were you able to get to your follow-up appointments?: No  Reason:: Readmitted prior to appointment  During previous admission, was a post-acute recommendation made?: Yes  What post-acute resources were offered?: [facility] [place]  Patient was readmitted due to: KIMBERLEY  Action Plan: IV lasix, cardio consult, geriatrics consult    Patient Information  Admitted from:: Home  Mental Status: Alert  During Assessment patient was accompanied by: Not accompanied during assessment  Assessment information  provided by:: Patient  Primary Caregiver: Self  Support Systems: Self, Daughter, Family members  County of Residence: [place]  What city do you live in?: [place]  Home entry access options. Select all that apply.: Ramp  Type of Current Residence: [facility]  Living Arrangements: Lives Alone    Activities of Daily Living Prior to Admission  Functional Status: Independent  Completes ADLs independently?: Yes  Ambulates independently?: Yes  Does patient use assisted devices?: Yes  Assisted Devices (DME) used: Walker, Nebulizer  DME Company Name (respiratory supplies): Pt unable to recall  Does patient currently own DME?: Yes  What DME does the patient currently own?: Bedside Commode, Walker, Wheelchair, Nebulizer, Shower Chair  Does patient have a history of Outpatient Therapy (PT/OT)?: Yes  Does the patient have a history of Short-Term Rehab?: No  Does patient have a history of HHC?: Yes  Does patient currently have HHC?: Yes    Current Home Health Care  Type of Current Home Care Services: Home PT, Home OT, Nurse visit  Current Home Health Agency:: [facility]  Current Home Health Follow-Up Provider:: PCP    Patient Information Continued  Income Source: Pension/group home  Does patient have prescription coverage?: Yes  Does patient receive dialysis treatments?: No  Does patient have a history of substance abuse?: No  Does patient have a history of Mental Health Diagnosis?: No    PHQ 2/9 Screening   Reviewed PHQ 2/9 Depression Screening Score?: No    Means of Transportation  Means of Transport to Appts:: Family transport      Social Determinants of Health (SDOH)      Flowsheet Row Most Recent Value   Housing Stability    In the last 12 months, was there a time when you were not able to pay the mortgage or rent on time? N   In the past 12 months, how many times have you moved where you were living? 0   At any time in the past 12 months, were you homeless or living in a shelter (including now)? N   Transportation Needs    In the  past 12 months, has lack of transportation kept you from medical appointments or from getting medications? no   In the past 12 months, has lack of transportation kept you from meetings, work, or from getting things needed for daily living? No   Food Insecurity    Within the past 12 months, you worried that your food would run out before you got the money to buy more. Never true   Within the past 12 months, the food you bought just didn't last and you didn't have money to get more. Never true   Utilities    In the past 12 months has the electric, gas, oil, or water company threatened to shut off services in your home? No            DISCHARGE DETAILS:    Discharge planning discussed with:: Patient  Freedom of Choice: Yes  Comments - Freedom of Choice: Pt reported preference of returning home with [facility]; aware PT/OT recs are pending  CM contacted family/caregiver?: No- see comments  Were Treatment Team discharge recommendations reviewed with patient/caregiver?: Yes  Did patient/caregiver verbalize understanding of patient care needs?: Yes  Were patient/caregiver advised of the risks associated with not following Treatment Team discharge recommendations?: Yes    Contacts  Patient Contacts: Farnaz  Relationship to Patient:: Other (Comment) (Self)  Contact Method: In Person  Reason/Outcome: Continuity of Care, Referral, Discharge Planning    Requested Home Health Care         Is the patient interested in HHC at discharge?: Yes  Home Health Discipline requested:: Nursing, Occupational Therapy, Physical Therapy  Home Health Agency Name:: [facility] External Referral Reason (only applicable if external HHA name selected): Patient has established relationship with provider  Home Health Follow-Up Provider:: PCP  Home Health Services Needed:: Evaluate Functional Status and Safety, Gait/ADL Training, Strengthening/Theraputic Exercises to Improve Function  Homebound Criteria Met:: Uses an Assist Device (i.e. cane, walker,  etc)  Supporting Clincal Findings:: Limited Endurance, Fatigues Easliy in Short Distances    DME Referral Provided  Referral made for DME?: No    Other Referral/Resources/Interventions Provided:  Interventions: Other (Specify), [facility]  Referral Comments: Pt noted to be a 30 day readmission. CM met with pt at bedside, introduced self and role with discharge planning. Pt reported she came back to the hospital because she didn't feel right. Pt reported she had a nurse visit with [facility] and the nurse told her she didn't like how she looked. Pt reported her daughter took her back to the hospital to get checked out. Pt reported she was able to get her prescriptions when leaving the hospital and took everything as prescribed. Pt reported she was admitted to the hospital before she could get to her follow up appointments that were scheduled today. Pt reported she lives by herself in a ranch style home with ramp entrance. Pt reported her daughter lives next door and is supportive. Pt reported she was independent prior to admission. Pt reported she has a RW, BSC, w/c, shower chair, and nebulizer. Pt reported she has a hx of OP PT. Pt reported her preferred pharamcy is [facility] and PCP is Naveen Monsivais MD. Pt reported her daughter is her POA and assists with all transportation needs. CM sent referral to [facility] via [employer] for [employer]. CM reserved agency. Formal PT/OT recs pending, CM will remain available.    Treatment Team Recommendation: Other (TBD)  Discharge Destination Plan:: Home with Home Health Care

## 2024-07-25 NOTE — ED ATTENDING ATTESTATION
7/24/2024  I, Odalis rBonson MD, saw and evaluated the patient. I have discussed the patient with the resident/non-physician practitioner and agree with the resident's/non-physician practitioner's findings, Plan of Care, and MDM as documented in the resident's/non-physician practitioner's note, except where noted. All available labs and Radiology studies were reviewed.  I was present for key portions of any procedure(s) performed by the resident/non-physician practitioner and I was immediately available to provide assistance.       At this point I agree with the current assessment done in the Emergency Department.  I have conducted an independent evaluation of this patient a history and physical is as follows:    79 yo female with h/o CHF, Htn, HLD, A-fib, and recent admission (7/13-7/17) for syncope, CHF, right pleural effusion.  Reports increased fatigue and SOB over last several days, family report that she is very drowsy and noted to be hypotensive at home by home care nurse.  EKG without new ischemia.  CXR worse than prior.  On exam pt volume overloaded, labs concerning for worsening renal function and hyponatremia.  Pt requires admission for correction of electrolytes, trending of creat and diuresis.          Past Medical History:   Diagnosis Date    Actinic keratosis     last assessed - 06Jun2014    Arthritis     Atrial fibrillation with rapid ventricular response (HCC)     last assessed - 26Apr2016    Atrial fibrillation with rapid ventricular response (HCC) 04/19/2016    Basal cell carcinoma     Benign colon polyp     last assessed - 27Apr2015    Bronchiectasis without complication (HCC)     CHF (congestive heart failure) (HCC) 06/2022    Chronic diastolic CHF (congestive heart failure) (HCC)     Disease of thyroid gland     Effusion of knee joint right     Resolved - 19Apr2016    Esophageal reflux     Fibromyalgia     last assessed - 27Dec2017    Fibromyalgia, primary     GERD (gastroesophageal reflux  disease)     Hyperlipidemia     Hypertension     Hyponatremia     Malignant neoplasm of thyroid gland (HCC)     Meningioma (HCC)     MGUS (monoclonal gammopathy of unknown significance)     Palpitations     last assessed - [id number]    Peroneal tendonitis, right     last assessed - [id number]    Right lumbar radiculopathy 03/17/2016    Thyroid cancer (HCC)     Thyroid nodule     Trochanteric bursitis of left hip 03/09/2018             ED Course  ED Course as of 07/24/24 2246   Wed Jul 24, 2024   1620 CBC and differential(!)  No significant leukocytosis reassuring diff, normal H/H, normal platelets.      1625 Comprehensive metabolic panel(!)  KIMBERLEY, creat 0.9 per chart at baseline, hyponatremia, minimally elevated glucose without AG, normal bicarb, minimally elevated AST, normal ALT/T. Bili   1638 hs TnI 0hr: 8  Noted, no STEMI on EKG   1714 Echo 7/14/2024 -   Interpretation Summary  Show Result Comparison   ·  Left Ventricle: Left ventricular cavity size is normal. Wall thickness is normal. The left ventricular ejection fraction is 50%. Systolic function is mildly reduced. There is mild global hypokinesis.  ·  IVS: There is diastolic flattening of the interventricular septum consistent with right ventricle volume overload.  ·  Right Ventricle: Right ventricular cavity size is dilated.  ·  Left Atrium: The atrium is dilated.  ·  Mitral Valve: There is mild regurgitation, but only limited mitral valves views are provided.  ·  Tricuspid Valve: The tricuspid valve has mild annular dilation. There is moderate regurgitation. The right ventricular systolic pressure is moderately elevated. The estimated right ventricular systolic pressure is 50.00 mmHg.              Critical Care Time  Procedures

## 2024-07-25 NOTE — PLAN OF CARE
Problem: PHYSICAL THERAPY ADULT  Goal: Performs mobility at highest level of function for planned discharge setting.  See evaluation for individualized goals.  Description: Treatment/Interventions: Functional transfer training, LE strengthening/ROM, Therapeutic exercise, Endurance training, Patient/family training, Equipment eval/education, Gait training  Equipment Recommended: Walker       See flowsheet documentation for full assessment, interventions and recommendations.  Note: Prognosis: Fair  Problem List: Decreased strength, Decreased endurance, Impaired balance, Decreased mobility, Decreased safety awareness  Assessment: Pt is a 78 y.o. female seen for PT evaluation s/p admit to [facility] on 8/18/2022 w/ Fatigue.  Order placed for PT. Comorbidities affecting pt's physical performance at time of assessment include: DM, HTN, and neuropathy. Personal factors affecting pt at time of IE include: limited home support, advanced age, limited insight into impairments, and recent fall(s). Prior to admission, pt was independent w/ all functional mobility w/ out AD, lived in one floor environment, and lived alone . Upon evaluation: Pt requires mod I for bed mobility, supervision for sit to stand, and supervision for ambulation with RW.   (Please find full objective findings from PT assessment regarding body systems outlined above). Impairments and limitations also listed above, especially due to  weakness, impaired balance, decreased endurance, gait deviations, decreased activity tolerance, decreased safety awareness, and fall risk.  Pt's clinical presentation is currently unstable/unpredictable seen in pt's presentation of fall risk, limited insight into deficits, and significant decline in functional mobility compared to baseline.  Pt to benefit from continued skilled PT tx while in hospital and upon DC to address deficits as defined above and maximize level of functional mobility.  Recommend  progression of  ambulation and initiation of HEP as appropriate .        Rehab Resource Intensity Level, PT: III (Minimum Resource Intensity)    See flowsheet documentation for full assessment.

## 2024-07-25 NOTE — ASSESSMENT & PLAN NOTE
Increased fatigue, weakness, poor oral intake since last hospitalization  Presyncope episode day of admission  Pt's Lasix dose increased from 20 to 40 mg daily since last admission   Suspect multifactorial, related to overdiuresis, poor oral intake, KIMBERLEY    Plan:   Hold Lasix at this time, per cardio  Gentle fluids  PT/OT consult  Orthostatic vitals  Cardiac interrogation  Consider geriatric consult

## 2024-07-25 NOTE — PHYSICAL THERAPY NOTE
PHYSICAL THERAPY EVALUATION  NAME: Farnaz Martin  AGE:   78 y.o.  MRN:  [id number]  ADMIT DX: Hyponatremia [E87.1]  Fatigue [R53.83]  Weakness [R53.1]  Pleural effusion [J90]  Acute kidney injury (HCC) [N17.9]  Ascites [R18.8]  (HFpEF) heart failure with preserved ejection fraction (HCC) [I50.30]    PMH:   Past Medical History:   Diagnosis Date    Actinic keratosis     last assessed - 06Jun2014    Arthritis     Atrial fibrillation with rapid ventricular response (HCC)     last assessed - 26Apr2016    Atrial fibrillation with rapid ventricular response (HCC) 04/19/2016    Basal cell carcinoma     Benign colon polyp     last assessed - [id number]    Bronchiectasis without complication (HCC)     CHF (congestive heart failure) (HCC) 06/2022    Chronic diastolic CHF (congestive heart failure) (HCC)     Disease of thyroid gland     Effusion of knee joint right     Resolved - 19Apr2016    Esophageal reflux     Fibromyalgia     last assessed - [id number]    Fibromyalgia, primary     GERD (gastroesophageal reflux disease)     Hyperlipidemia     Hypertension     Hyponatremia     Malignant neoplasm of thyroid gland (HCC)     Meningioma (HCC)     MGUS (monoclonal gammopathy of unknown significance)     Palpitations     last assessed - 30Apr2013    Peroneal tendonitis, right     last assessed - [id number]    Right lumbar radiculopathy 03/17/2016    Thyroid cancer (HCC)     Thyroid nodule     Trochanteric bursitis of left hip 03/09/2018     LENGTH OF STAY: 1        07/25/24 1443   PT Last Visit   PT Visit Date 07/25/24   Note Type   Note type Evaluation   Pain Assessment   Pain Assessment Tool 0-10   Pain Score No Pain   Restrictions/Precautions   Weight Bearing Precautions Per Order No   Other Precautions Fall Risk   Home Living   Type of Home House   Home Layout One level;Ramped entrance   Bathroom Shower/Tub Walk-in shower   Bathroom Toilet Standard   Bathroom Equipment Grab bars in shower;Shower chair;Commode   Home  Equipment Walker;Cane   Additional Comments Ambulates independently without AD at baseline.  Pt reports recently (within last 2 weeks) has started using RW.   Prior Function   Level of [place] Independent with ADLs;Independent with functional mobility;Independent with IADLS   Lives With Alone   Receives Help From Family  (daughter lives next door)   IADLs Independent with meal prep;Family/Friend/Other provides medication management;Independent with driving   Falls in the last 6 months 1 to 4  (1)   General   Family/Caregiver Present No   Cognition   Overall Cognitive Status WFL   Arousal/Participation Cooperative   Orientation Level Oriented X4   Memory Within functional limits   Following Commands Follows one step commands without difficulty   Comments Pt identified by name and .   Subjective   Subjective Agrees to PT evaluation and is pleasant and cooperative throughout session.   RLE Assessment   RLE Assessment X   Strength RLE   RLE Overall Strength 4/5  (functionally)   LLE Assessment   LLE Assessment X   Strength LLE   LLE Overall Strength 4/5  (functionally)   Bed Mobility   Supine to Sit 6  Modified independent   Additional items HOB elevated;Increased time required   Sit to Supine 6  Modified independent   Additional items HOB elevated;Increased time required   Transfers   Sit to Stand 5  Supervision   Additional items Increased time required;Verbal cues   Stand to Sit 5  Supervision   Additional items Increased time required;Verbal cues   Ambulation/Elevation   Gait pattern Forward Flexion;Decreased foot clearance;Short stride;Excessively slow   Gait Assistance 5  Supervision   Additional items Verbal cues   Assistive Device Rolling walker   Distance ~120`x1   Balance   Static Sitting Good   Dynamic Sitting Fair +   Static Standing Fair   Dynamic Standing Fair   Ambulatory Fair -  (with RW)   Endurance Deficit   Endurance Deficit Yes   Endurance Deficit Description limited ambulation distance,  decreased gait speed   Activity Tolerance   Activity Tolerance Patient limited by fatigue;Patient tolerated treatment well   Nurse Made Aware Per RN, pt appropriate to evaluate   Assessment   Prognosis Fair   Problem List Decreased strength;Decreased endurance;Impaired balance;Decreased mobility;Decreased safety awareness   Assessment Pt is a 78 y.o. female seen for PT evaluation s/p admit to [facility] on 8/18/2022 w/ Fatigue.  Order placed for PT. Comorbidities affecting pt's physical performance at time of assessment include: DM, HTN, and neuropathy. Personal factors affecting pt at time of IE include: limited home support, advanced age, limited insight into impairments, and recent fall(s). Prior to admission, pt was independent w/ all functional mobility w/ out AD, lived in one floor environment, and lived alone . Upon evaluation: Pt requires mod I for bed mobility, supervision for sit to stand, and supervision for ambulation with RW.   (Please find full objective findings from PT assessment regarding body systems outlined above). Impairments and limitations also listed above, especially due to  weakness, impaired balance, decreased endurance, gait deviations, decreased activity tolerance, decreased safety awareness, and fall risk.  Pt's clinical presentation is currently unstable/unpredictable seen in pt's presentation of fall risk, limited insight into deficits, and significant decline in functional mobility compared to baseline.  Pt to benefit from continued skilled PT tx while in hospital and upon DC to address deficits as defined above and maximize level of functional mobility.  Recommend  progression of ambulation and initiation of HEP as appropriate .   Goals   Patient Goals to go home, feel better   [facility] Expiration Date 08/04/24   Short Term Goal #1 Pt will be able to: (1) perform sit to stand with mod I to increase level of independence and promote safe toiletting and transfers (2) ambulate at  least 200` with mod I and least restrictive AD to increase activity tolerance and allow for safe community mobilization (3) increase standing balance by 1 grade to decrease risk of falls   PT Treatment Day 0   Plan   Treatment/Interventions Functional transfer training;LE strengthening/ROM;Therapeutic exercise;Endurance training;Patient/family training;Equipment eval/education;Gait training   PT Frequency 1-2x/wk   Discharge Recommendation   Rehab Resource Intensity Level, PT III (Minimum Resource Intensity)   Equipment Recommended Walker   Walker Package Recommended Wheeled walker   [facility] Basic Mobility Inpatient   Turning in Flat Bed Without Bedrails 4   Lying on Back to Sitting on Edge of Flat Bed Without Bedrails 4   Moving Bed to Chair 3   Standing Up From Chair Using Arms 3   Walk in Room 3   Climb 3-5 Stairs With Railing 3   Basic Mobility Inpatient Raw Score 20   Basic Mobility Standardized Score 43.99   [facility] Level Of Mobility   -HLM Goal 6: Walk 10 steps or more   JH-HLM Achieved 7: Walk 25 feet or more   End of Consult   Patient Position at End of Consult Seated edge of bed;All needs within reach     The patient's [facility] Basic Mobility Inpatient Short Form Raw Score is 20, Standardized Score is 43.99.  A Raw Score of greater than or equal to 16 suggests the patient may benefit from discharge to home. However please refer to therapist recommendation for discharge planning given other factors that may influence destination.     Adapted from Elieser CLARKE, Luh J, Roxi J, Nicole J. [employer] [facility] “6-Clicks” Basic Mobility and Daily Activity Scores With Discharge Destination. Physical Therapy, 2021;101:1-9. DOI: [email or web address]      Bárbara Lozano, LLOYD,DPT

## 2024-07-25 NOTE — ASSESSMENT & PLAN NOTE
Wt Readings from Last 3 Encounters:   07/25/24 73.2 kg (161 lb 6.4 oz)   07/22/24 72.5 kg (159 lb 12.8 oz)   07/17/24 72.1 kg (159 lb)       BNP elevated at 1,428 on admission    Plan:  Strict I/Os  Daily weights  Rest of plan as KIMBERLEY

## 2024-07-25 NOTE — CONSULTS
Consultation - Cardiology Team One  Farnaz Martin 78 y.o. female MRN: [id number]  Unit/Bed#: -01 Encounter: [id number]    Inpatient consult to Cardiology  Consult performed by: JENNY Sapp  Consult ordered by: Yenny Spencer MD    Physician Requesting Consult: Travis Silva MD  Reason for Consult / Principal Problem: CHF    Assessment    Weakness/fatigue  Multifactorial from potential over diuresis resulting in KIMBERLEY, deconditioning, poor oral intake    KIMBERLEY- creatinine continues to rise, 1.9 today. Baseline 0.9-1.1    Acute on chronic hyponatremia  Na 124 from 128 on admission    Recurrent right sided pleural effusion  Moderate per CXR read  Thoracentesis last admit consistent with transudative effusion    Chronic HFpEF  Home diuretic: Lasix 40 mg daily but taking BID per patient since last d/c  Weight: 161 lb per standing scale  Dry weight: recently 158-160 lb  No symptoms of acute CHF exacerbation    Paroxysmal atrial fibrillation/flutter  Hx of atrial fibrillation s/p ablation x 2, atrial flutter ablation as well  History of atrial tachycardia on device interrogations  PTA-Tambocor 150 mg twice daily, Toprol XL 75 mg twice daily (decreased on 7/22 from 100 mg BID due on going fatigue) and diltiazem 120 mg daily  History of QTc prolongation and torsades with sotalol  Anticoagulated on Eliquis 5 mg twice daily    7.  HTN- low normal, 24 hour average /58  Home Rx: Lasix, diltiazem, Toprol    8.  HLD- on Zetia    9.  Bronchiectasis- chronic cough, unchanged per patient. Saw pulmonology last admission.     10.  Left renal mass- outpatient follow up    11.  Type 2 DM- A1c 8.8%    Plan/discussion  Given rising creatinine, worsening hyponatremia, and reported increased Lasix dose at home to 40 mg p.o. twice daily despite being discharged on 40 mg daily suspect a level of intravascular volume depletion.  Recommend gentle IV fluids with repeat BMP this afternoon.  Continue to hold  diuretics  Continue flecainide 150 mg twice daily, Toprol-XL 75 mg twice daily, and diltiazem 120 mg daily with hold parameters.  Continue anticoagulation with Eliquis 5 mg p.o. twice daily    History of Present Illness   HPI: Farnaz Martin is a 78 y.o. year old female with paroxysmal atrial fibrillation/flutter status post cryoablation with PVI 3/2021 with symptomatic bradycardia postprocedure and underwent MDT dual-chamber PPM (initiated on flecainide at that time), subsequently 8/2022 at [facility] underwent repeat PVI as well as posterior wall isolation and CTI flutter ablation (was on amiodarone short-term ), followed by cardioversion 8/2023 for recurrent A-fib, meningioma (follows at [facility] ), chronic HFpEF, HTN, HLD, moderate MR, MGUS, type 2 diabetes, thyroid nodule status post lobectomy, and chronic hyponatremia. She follows with cardiologist Dr. Cunningham as well as Dr. Muhammad at [facility]. She last saw Dr. Muhammad as a telemedicine visit in 2022.     She was recently admitted at [facility] for a fall/questionable syncopal event of which she has very poor recall of the exact events.  She was noted to have a right-sided pleural effusion during that admission and underwent thoracentesis with fluid analysis consistent with a transudative effusion.  A Limited echocardiogram showed LVEF 50% with mild global hypokinesis, and a mildly dilated RV with normal systolic function and flattening of the interventricular septum consistent with RV volume overload.  Prior to that admission, she was maintained on Lasix 20 mg p.o. daily.  Her Lasix was increased to 40 mg p.o. daily at discharge.  Device interrogation have been unremarkable.  She followed up with the PA-C on 7/22.  She reported ongoing fatigue and her metoprolol was decreased from 100 mg twice daily to 75 mg twice daily.  Repeat CXR was completed on the day of her appointment showing resolution of small left pleural effusion and a development of a small right  pleural effusion.  Visiting nurses did not feel she looked well yesterday and the patient reported increasing weakness and fatigue over the past few days so she was brought to the ER for further evaluation. She has noted intermittent hypotension at home although she cannot tell me exactly what those numbers are.  She denies any increase in cough, shortness of breath, orthopnea, or lower extremity edema.  She notes steady weight gain however her weight at home has been around 158 to 160 pounds which is her dry weight.  She is drinking 3 to 4 cups of water per day with 1 cup of decaf tea.  She also tells me that she has been taking Lasix 40 mg p.o. twice daily since her hospital discharge.    Labs on admission showed hyponatremia and an KIMBERLEY with a creatinine up to 1.59 and a sodium of 128.  These labs are progressively worsened over the past 24 hours with sodium this morning 124 and creatinine up to 1.91.  NP 1428 with negative troponins.  Chest x-ray shows increase in her right pleural effusion and she was initially supposed to get IV Lasix due to concerns for volume overload.  However, she did not receive the IV Lasix due to the blood pressure hold parameter.  Cardiology is consulted for further evaluation and management of her CHF.    EKG reviewed personally: Paced    Telemetry reviewed personally: AV paced    Review of Systems   Constitutional: Positive for malaise/fatigue. Negative for chills, diaphoresis and weight gain.   Cardiovascular:  Negative for chest pain, dyspnea on exertion, leg swelling, orthopnea, palpitations and syncope.   Respiratory:  Positive for cough. Negative for shortness of breath, sleep disturbances due to breathing and sputum production.    Gastrointestinal:  Negative for bloating, abdominal pain, nausea and vomiting.   Neurological:  Positive for weakness. Negative for dizziness and light-headedness.   Psychiatric/Behavioral:  Negative for altered mental status.    All other systems  reviewed and are negative.    Historical Information   Past Medical History:   Diagnosis Date    Actinic keratosis     last assessed - 06Jun2014    Arthritis     Atrial fibrillation with rapid ventricular response (HCC)     last assessed - 26Apr2016    Atrial fibrillation with rapid ventricular response (HCC) 04/19/2016    Basal cell carcinoma     Benign colon polyp     last assessed - 27Apr2015    Bronchiectasis without complication (HCC)     CHF (congestive heart failure) (HCC) 06/2022    Chronic diastolic CHF (congestive heart failure) (HCC)     Disease of thyroid gland     Effusion of knee joint right     Resolved - 19Apr2016    Esophageal reflux     Fibromyalgia     last assessed - 27Dec2017    Fibromyalgia, primary     GERD (gastroesophageal reflux disease)     Hyperlipidemia     Hypertension     Hyponatremia     Malignant neoplasm of thyroid gland (HCC)     Meningioma (HCC)     MGUS (monoclonal gammopathy of unknown significance)     Palpitations     last assessed - 30Apr2013    Peroneal tendonitis, right     last assessed - 02Oct2013    Right lumbar radiculopathy 03/17/2016    Thyroid cancer (HCC)     Thyroid nodule     Trochanteric bursitis of left hip 03/09/2018     Past Surgical History:   Procedure Laterality Date    CARDIAC ELECTROPHYSIOLOGY STUDY AND ABLATION  08/2022    CATARACT EXTRACTION Bilateral     COLONOSCOPY  03/2018    COLONOSCOPY W/ POLYPECTOMY  2021    repeat in 5 years    EYE SURGERY      OOPHORECTOMY      RI NEUROPLASTY &/TRANSPOS MEDIAN NRV CARPAL TUNNE Right 11/14/2019    Procedure: RELEASE CARPAL TUNNEL;  Surgeon: Onesimo Tate MD;  Location: [facility];  Service: Orthopedics    RI TOTAL THYROID LOBECTOMY UNI W/WO ISTHMUSECTOMY Left 12/16/2020    Procedure: Left THYROID LOBECTOMY;  Surgeon: Jhony Bhatt MD;  Location: [facility];  Service: ENT    RECTAL POLYPECTOMY      REPLACEMENT TOTAL KNEE Left     last assessed - 27Apr2015    TONSILLECTOMY      TOTAL ABDOMINAL HYSTERECTOMY       TUBAL LIGATION      US GUIDED THYROID BIOPSY  10/14/2020     Social History     Substance and Sexual Activity   Alcohol Use Not Currently     Social History     Substance and Sexual Activity   Drug Use Never     Social History     Tobacco Use   Smoking Status Never   Smokeless Tobacco Never     Family History:   Family History   Problem Relation Age of Onset    Heart disease Mother     Diabetes Mother     Heart disease Father     Coronary artery disease Father     Stroke Father         cerebrovascular accident    Heart attack Father         myocardial infarction    Sudden death Father         scd    Other Family         Back disorder    Coronary artery disease Family     Neuropathy Family     Osteoporosis Family     No Known Problems Daughter     No Known Problems Maternal Grandmother     No Known Problems Maternal Grandfather     No Known Problems Paternal Grandmother     No Known Problems Paternal Grandfather     Cancer Maternal Uncle     Breast cancer Maternal Aunt 65    No Known Problems Son     No Known Problems Maternal Aunt     No Known Problems Maternal Aunt     No Known Problems Maternal Aunt     No Known Problems Paternal Aunt     No Known Problems Paternal Aunt     Anuerysm Neg Hx     Clotting disorder Neg Hx     Arrhythmia Neg Hx     Heart failure Neg Hx        Meds/Allergies   all current active meds have been reviewed and current meds:   Current Facility-Administered Medications   Medication Dose Route Frequency    apixaban (ELIQUIS) tablet 5 mg  5 mg Oral BID    ascorbic acid (VITAMIN C) tablet 500 mg  500 mg Oral Daily    diltiazem (CARDIZEM CD) 24 hr capsule 120 mg  120 mg Oral Daily    ezetimibe (ZETIA) tablet 10 mg  10 mg Oral HS    flecainide (TAMBOCOR) tablet 100 mg  100 mg Oral BID    gabapentin (NEURONTIN) capsule 300 mg  300 mg Oral HS    insulin glargine (LANTUS) subcutaneous injection 10 Units 0.1 mL  10 Units Subcutaneous HS    insulin lispro (HumALOG/ADMELOG) 100 units/mL subcutaneous  "injection 1-6 Units  1-6 Units Subcutaneous 4x Daily (PC & HS)    lidocaine (LIDODERM) 5 % patch 1 patch  1 patch Topical Daily    loratadine (CLARITIN) tablet 10 mg  10 mg Oral Daily    metoprolol succinate (TOPROL-XL) 24 hr tablet 75 mg  75 mg Oral Q12H    polyethylene glycol (MIRALAX) packet 17 g  17 g Oral Daily    senna-docusate sodium (SENOKOT S) 8.6-50 mg per tablet 1 tablet  1 tablet Oral HS    zolpidem (AMBIEN) tablet 10 mg  10 mg Oral HS PRN          Allergies   Allergen Reactions    Sotalol Other (See Comments)     Prolonged QTc leading to torsades de pointes     Penicillins Other (See Comments)     As a child calcium deposit in the arm     Ace Inhibitors GI Intolerance     Did feel well on it    Omeprazole Abdominal Pain, Rash and Vomiting     stomach pain, vomiting, rash       Objective   Vitals: Blood pressure 103/58, pulse 65, temperature 99.1 °F (37.3 °C), temperature source Axillary, resp. rate 16, height 5' 2\" (1.575 m), weight 73.2 kg (161 lb 6.4 oz), last menstrual period 1990, SpO2 91%, not currently breastfeeding., Body mass index is 29.52 kg/m².,     Systolic (24hrs), Av , Min:91 , Max:126     Diastolic (24hrs), Av, Min:50, Max:66      No intake or output data in the 24 hours ending 24 0828  Wt Readings from Last 3 Encounters:   24 73.2 kg (161 lb 6.4 oz)   24 72.5 kg (159 lb 12.8 oz)   24 72.1 kg (159 lb)     Invasive Devices       Peripheral Intravenous Line  Duration             Peripheral IV 24 Right Antecubital <1 day                    Physical Exam  Vitals reviewed.   Constitutional:       General: She is not in acute distress.     Comments: Chronically ill appearing   Neck:      Vascular: No hepatojugular reflux or JVD.   Cardiovascular:      Rate and Rhythm: Normal rate and regular rhythm.      Heart sounds: Normal heart sounds. No murmur heard.     No friction rub. No gallop.   Pulmonary:      Effort: Pulmonary effort is normal. No " respiratory distress.      Breath sounds: No rales.      Comments: Very diminished at right base. Otherwise clear. On room air  Abdominal:      General: Bowel sounds are normal. There is no distension.      Palpations: Abdomen is soft.      Tenderness: There is no abdominal tenderness.   Musculoskeletal:         General: No tenderness. Normal range of motion.      Cervical back: Neck supple.      Right lower leg: No edema.      Left lower leg: No edema.   Skin:     General: Skin is warm and dry.      Capillary Refill: Capillary refill takes less than 2 seconds.      Findings: No erythema.   Neurological:      Mental Status: She is alert and oriented to person, place, and time.   Psychiatric:         Mood and Affect: Mood normal.     LABORATORY RESULTS:      CBC with diff:   Results from last 7 days   Lab Units 07/25/24  0436 07/24/24  1551   WBC Thousand/uL 8.30 7.48   HEMOGLOBIN g/dL 11.9 11.8   HEMATOCRIT % 37.2 36.6   MCV fL 92 92   PLATELETS Thousands/uL 281 282   RBC Million/uL 4.05 4.00   MCH pg 29.4 29.5   MCHC g/dL 32.0 32.2   RDW % 13.9 14.0   MPV fL 10.1 9.9   NRBC AUTO /100 WBCs 0 0       CMP:  Results from last 7 days   Lab Units 07/25/24  0436 07/24/24  1551 07/24/24  1045   POTASSIUM mmol/L 4.7 4.5 4.8   CHLORIDE mmol/L 95* 93* 93*   CO2 mmol/L 20* 23 24   BUN mg/dL 54* 50* 49*   CREATININE mg/dL 1.91* 1.64* 1.59*   CALCIUM mg/dL 8.7 9.0 9.1   AST U/L  --  46*  --    ALT U/L  --  52  --    ALK PHOS U/L  --  71  --    EGFR ml/min/1.73sq m 24 29 30       BMP:  Results from last 7 days   Lab Units 07/25/24  0436 07/24/24  1551 07/24/24  1045   POTASSIUM mmol/L 4.7 4.5 4.8   CHLORIDE mmol/L 95* 93* 93*   CO2 mmol/L 20* 23 24   BUN mg/dL 54* 50* 49*   CREATININE mg/dL 1.91* 1.64* 1.59*   CALCIUM mg/dL 8.7 9.0 9.1       Lab Results   Component Value Date    CREATININE 1.91 (H) 07/25/2024    CREATININE 1.64 (H) 07/24/2024    CREATININE 1.59 (H) 07/24/2024       Lab Results   Component Value Date    NTBNP 761  (H) 2021    NTBNP 2,773 (H) 2021    NTBNP 506 (H) 2019          Results from last 7 days   Lab Units 24  0436 24  1045   MAGNESIUM mg/dL 2.4 2.3                         Lipid Profile:   Lab Results   Component Value Date    CHOL 205 2015    CHOL 195 07/10/2014     Lab Results   Component Value Date    HDL 28 (L) 2024    HDL 46 (L) 2024    HDL 53 2023     Lab Results   Component Value Date    LDLCALC 40 2024    LDLCALC 64 2024    LDLCALC 96 2023     Lab Results   Component Value Date    TRIG 55 2024    TRIG 63 2024    TRIG 68 2023       Cardiac testing:   Results for orders placed during the hospital encounter of 21    Echo complete with contrast if indicated    [address] [place] [id number] [phone]    Transthoracic Echocardiogram  2D, M-mode, Doppler, and Color Doppler    Study date:  2021    Patient: TANISHA LEVINE  MR number: [id number]  Account number: [id number]  : 1945  Age: 75 years  Gender: Female  Status: Inpatient  Location: Bedside  Height: 62 in  Weight: 151.6 lb  BP: 126/ 94 mmHg    Indications: Atrial fibrillation    Diagnoses: I48.0 - Atrial fibrillation    Sonographer:  IGNACIO Cameron  Primary Physician:  Naveen Monsivais MD  Referring Physician:  Shantal Huff MD  Group:  [facility]  Interpreting Physician:  Kwabena Seymour MD    SUMMARY    LEFT VENTRICLE:  Systolic function was normal. Ejection fraction was estimated to be 55 %.  There were no regional wall motion abnormalities.  Wall thickness was at the upper limits of normal.  Doppler parameters were consistent with elevated ventricular end-diastolic filling pressure.    LEFT ATRIUM:  The atrium was mildly to moderately dilated.    RIGHT ATRIUM:  The atrium was mildly dilated.    MITRAL VALVE:  There was mild stenosis.    TRICUSPID VALVE:  There was  mild to moderate regurgitation.  Pulmonary artery systolic pressure was mildly increased.    HISTORY: PRIOR HISTORY: HLD, HTN, PAF, chronic CHF, thyroid nodule    PROCEDURE: The procedure was performed at the bedside. This was a routine study. The transthoracic approach was used. The study included complete 2D imaging, M-mode, complete spectral Doppler, and color Doppler. The heart rate was 101 bpm,  at the start of the study. Images were obtained from the parasternal, apical, subcostal, and suprasternal notch acoustic windows. Echocardiographic views were limited due to lung interference. This was a technically difficult study.    LEFT VENTRICLE: Size was normal. Systolic function was normal. Ejection fraction was estimated to be 55 %. There were no regional wall motion abnormalities. Wall thickness was at the upper limits of normal. DOPPLER: Transmitral flow  pattern: atrial fibrillation. Left ventricular diastolic function parameters were abnormal. Doppler parameters were consistent with elevated ventricular end-diastolic filling pressure.    RIGHT VENTRICLE: The size was normal. Systolic function was normal. Wall thickness was normal.    LEFT ATRIUM: The atrium was mildly to moderately dilated.    RIGHT ATRIUM: The atrium was mildly dilated.    MITRAL VALVE: Valve structure was normal. There was normal leaflet separation. DOPPLER: The transmitral velocity was within the normal range. There was mild stenosis. There was no significant regurgitation.    AORTIC VALVE: The valve was trileaflet. Leaflets exhibited normal thickness and normal cuspal separation. DOPPLER: Transaortic velocity was within the normal range. There was no evidence for stenosis. There was no significant  regurgitation.    TRICUSPID VALVE: The valve structure was normal. There was normal leaflet separation. DOPPLER: The transtricuspid velocity was within the normal range. There was no evidence for stenosis. There was mild to moderate  regurgitation. Pulmonary  artery systolic pressure was mildly increased.    PULMONIC VALVE: Leaflets exhibited normal thickness, no calcification, and normal cuspal separation. DOPPLER: The transpulmonic velocity was within the normal range. There was no significant regurgitation.    PERICARDIUM: There was no pericardial effusion. The pericardium was normal in appearance.    AORTA: The root exhibited normal size.    SYSTEMIC VEINS: IVC: The inferior vena cava was normal in size.    PULMONARY VEINS: DOPPLER: Doppler signals were not recordable in the pulmonary vein(s).    SYSTEM MEASUREMENT TABLES    2D  %FS: 37.15 %  Ao Diam: 2.74 cm  Ao asc: 2.72 cm  EDV(Teich): 103.17 ml  EF(Teich): 67.06 %  ESV(Teich): 33.98 ml  IVSd: 0.94 cm  LA Area: 13.2 cm2  LA Diam: 3.98 cm  LVEDV MOD A4C: 32.01 ml  LVEF MOD A4C: 63.74 %  LVESV MOD A4C: 11.61 ml  LVIDd: 4.72 cm  LVIDs: 2.96 cm  LVLd A4C: 5.9 cm  LVLs A4C: 4.79 cm  LVPWd: 0.93 cm  RA Area: 8.67 cm2  RVIDd: 3.35 cm  SV MOD A4C: 20.4 ml  SV(Teich): 69.19 ml    CW  TR MaxP.39 mmHg  TR Vmax: 2.89 m/s    MM  TAPSE: 1.51 cm    [facility] Accredited Echocardiography Laboratory    Prepared and electronically signed by    Kwabena Seymour MD  Signed 2021 11:05:34    No results found for this or any previous visit.    No valid procedures specified.  Results for orders placed during the hospital encounter of 10/05/22    NM myocardial perfusion spect (stress and/or rest)    Interpretation Summary    Resting ECG: ECG is abnormal. Resting ECG shows no ST-segment deviation. Patient a paced and V sensed.    Perfusion Defect Conclusion: The stress/rest perfusion ratio is 1.04 . There is no evidence of transient ischemic dilation (TID).    Perfusion: There is a left ventricular perfusion defect that is small in size with mild reduction in uptake present in the mid to apical anterior and anteroseptal location(s) that is predominantly fixed. The defect appears to be an  artifact caused by breast attenuation.    Stress Function: Left ventricular function post-stress is normal. Post-stress ejection fraction is 70 %.    Negative exercise pharmacological stress test      Imaging: I have personally reviewed pertinent reports.   and I have personally reviewed pertinent films in PACS  US bedside procedure    Result Date: 7/25/2024  Narrative: [id number]    XR chest 1 view portable    Result Date: 7/24/2024  Narrative: XR CHEST PORTABLE INDICATION: sob. Fatigue, generalized edema. COMPARISON: CXR 7/22/2024, chest CT 7/13/2024. FINDINGS: Mild pulmonary venous congestion. Slight increase in moderate right effusion. Mild atelectasis in the right mid lung. No pneumothorax. Mild cardiomegaly with left subclavian pacemaker leads in right atrium and right ventricle. Bones are unremarkable for age. Normal upper abdomen.     Impression: Mild pulmonary venous congestion with slight increase in moderate right effusion. Mild subsegmental atelectasis in the right midlung. Workstation performed: [id number]     XR chest pa & lateral    Result Date: 7/22/2024  Narrative: CHEST INDICATION:   Pleural effusion, not elsewhere classified. COMPARISON:  None. EXAM PERFORMED/VIEWS:  XR CHEST PA & LATERAL FINDINGS: Cardiomediastinal silhouette appears unremarkable. Compared to prior study of 7/1524, the right pleural effusion has increased slightly. Atelectasis now seen in the base of the right upper lobe along the minor fissure. No left effusion. That has resolved. Improved aeration in the left lower lobe. No pulmonary edema or pneumothorax. Pacemaker. Osseous structures appear within normal limits for patient age.     Impression: Resolution of previous left pleural effusion with decreased atelectasis at the left base. Mild linear atelectasis remains in the left. New small right pleural effusion. Increased atelectasis in the right base and atelectasis in the base of the right upper  lobe. Electronically signed: 07/22/2024 06:14 PM Iftikhar Lebron MD    US bedside procedure    Result Date: 7/17/2024  Narrative: [id number]    XR chest portable    Result Date: 7/15/2024  Narrative: XR CHEST PORTABLE INDICATION: s/p right thora. COMPARISON: CXR and chest CT 7/13/2024. FINDINGS: No pneumothorax after thoracentesis. No definite right effusion. Trace left effusion. Mild bibasilar atelectasis. Normal heart size with left subclavian pacemaker leads in right atrium and right ventricle. Bones are unremarkable for age. Normal upper abdomen.     Impression: No pneumothorax after thoracentesis. No definite right effusion. Trace left effusion. Workstation performed: [id number]     XR chest 1 view    Result Date: 7/15/2024  Narrative: CHEST INDICATION:   TRAUMA. COMPARISON: CXR 5/24/2024, chest CT 7/13/2024. EXAM PERFORMED/VIEWS:  XR TRAUMA MULTIPLE. FINDINGS: Mild interstitial edema. Moderate right and small left pleural effusion, better shown on CT. No pneumothorax. Moderate cardiomegaly with left subclavian pacemaker leads in right atrium and right ventricle. Skeleton normal for age.  No acute displaced fractures. Old right rib fractures. Upper abdomen normal.     Impression: No definite acute displaced fracture but see subsequent chest CT. Mild interstitial edema. Moderate right and small left pleural effusion, better shown on CT. Workstation performed: [id number]     Echo follow up/limited w/ contrast if indicated    Result Date: 7/15/2024  Narrative:   Left Ventricle: Left ventricular cavity size is normal. Wall thickness is normal. The left ventricular ejection fraction is 50%. Systolic function is mildly reduced. There is mild global hypokinesis.   IVS: There is diastolic flattening of the interventricular septum consistent with right ventricle volume overload.   Right Ventricle: Right ventricular cavity size is dilated.   Left Atrium: The atrium is dilated.   Mitral  Valve: There is mild regurgitation, but only limited mitral valves views are provided.   Tricuspid Valve: The tricuspid valve has mild annular dilation. There is moderate regurgitation. The right ventricular systolic pressure is moderately elevated. The estimated right ventricular systolic pressure is 50.00 mmHg.     Thoracentesis (Bedside)    Result Date: 7/15/2024  Narrative: Thu Belle MD     7/15/2024 11:00 AM Thoracentesis (Bedside) Date/Time: 7/15/2024 10:50 AM Performed by: Thu Belle MD Authorized by: Thu Belle MD  Patient location:  Bedside Consent:   Consent obtained:  Written   Consent given by:  Patient   Risks discussed:  Bleeding, infection, pain, pneumothorax, nerve damage and incomplete drainage   Alternatives discussed:  No treatment Universal protocol:   Procedure explained and questions answered to patient or proxy's satisfaction: yes    Relevant documents present and verified: yes    Test results available and properly labeled: yes    Radiology Images displayed and confirmed.  If images not available, report reviewed: yes    Required blood products, implants, devices and special equipment available: yes    Site/side marked: yes    Patient identity confirmed:  Verbally with patient Indications:   Procedure Purpose: diagnostic and therapeutic    Indications: pleural effusion  Anesthesia (see MAR for exact dosages):   Anesthesia method:  Local infiltration   Local anesthetic:  Lidocaine 1% w/o epi Procedure details:   Preparation: Patient was prepped and draped in usual sterile fashion    Standard thoracentesis cath kit used: Yes    Patient position:  Sitting   Laterality:  Right   Location:  Midaxillary line   Intercostal space:  5th   Ultrasound guidance: no    Indwelling catheter placed: no    Number of attempts:  1   Drainage color:  Yellow   Fluid removed amount:  700mL Post-procedure details:   Chest x-ray performed: yes    Chest x-ray findings:  Pleural effusion improved    Patient tolerance of procedure:  Tolerated well, no immediate complications Comments:    Supervised by Dr. Huerta who was present and assisted throughout the entire procedure.      bedside procedure    Result Date: 7/15/2024  Narrative: [id number]    TRAUMA - CT head wo contrast    Result Date: 7/13/2024  Narrative: CT BRAIN - WITHOUT CONTRAST INDICATION:   TRAUMA. COMPARISON: 5/24/2024 TECHNIQUE:  CT examination of the brain was performed.  Multiplanar 2D reformatted images were created from the source data. Radiation dose length product (DLP) for this visit:  1005 mGy-cm .  This examination, like all CT scans performed in the [facility], was performed utilizing techniques to minimize radiation dose exposure, including the use of iterative reconstruction and automated exposure control. IMAGE QUALITY:  Diagnostic. FINDINGS: PARENCHYMA:  No intracranial decreased attenuation is noted in periventricular and subcortical white matter demonstrating an appearance that is statistically most likely to represent mild microangiopathic change; this appearance is similar when compared to most recent prior examination. No CT signs of acute infarction.  No intracranial mass, mass effect or midline shift.  No acute parenchymal hemorrhage.  ass, mass effect or midline shift. No CT signs of acute infarction.  No acute parenchymal hemorrhage. There is hyperdensity in the left frontotemporal region which is not significantly changed compared to the prior. This appears to be related to a meningioma noted in October 2023. VENTRICLES AND EXTRA-AXIAL SPACES:  Normal for the patient's age. VISUALIZED ORBITS: Normal visualized orbits. PARANASAL SINUSES: Mild mucosal thickening in the left maxilla without significant change. CALVARIUM AND EXTRACRANIAL SOFT TISSUES:  Normal.     Impression: No acute intracranial abnormality. Hyperdensity on the left appearing related to  meningioma, seen to better advantage on MRI from October 2023. I personally discussed this study with Peter Bingham on 7/13/2024 7:17 AM. Workstation performed: [id number]     TRAUMA - CT spine cervical wo contrast    Result Date: 7/13/2024  Narrative: CT CERVICAL SPINE - WITHOUT CONTRAST INDICATION:   TRAUMA. COMPARISON: 5/24/2024 TECHNIQUE:  CT examination of the cervical spine was performed without intravenous contrast.  Contiguous axial images were obtained. Multiplanar 2D reformatted images were created from the source data. Radiation dose length product (DLP) for this visit:  380 mGy-cm .  This examination, like all CT scans performed in the [facility], was performed utilizing techniques to minimize radiation dose exposure, including the use of iterative reconstruction and automated exposure control. IMAGE QUALITY:  Diagnostic. FINDINGS: ALIGNMENT:  Normal alignment of the cervical spine. No subluxation. VERTEBRAE:  No fracture. Stable small low suspicion well-defined sclerotic focus in the left lateral inferior aspect of C7. DEGENERATIVE CHANGES:  Mild multilevel cervical degenerative changes are noted without critical central canal stenosis. PREVERTEBRAL AND PARASPINAL SOFT TISSUES:    stable well-defined cystic appearing hypodense nodule in the right thyroid, 2.2 x 2.3 cm.    Correlates well with a large cyst seen on prior thyroid from March, refer to ultrasound findings. Prior left thyroidectomy noted. THORACIC INLET: Interval enlargement of right pleural fluid, appearing moderate.     Impression: Stable compared to prior. No acute traumatic abnormality identified in the C-spine. Interval increased volume of right pleural fluid. I personally discussed this study with Peter Bingham on 7/13/2024 7:44 AM. Workstation performed: [id number]     TRAUMA - CT chest abdomen pelvis w contrast    Result Date: 7/13/2024  Narrative: CT CHEST, ABDOMEN AND PELVIS WITH IV CONTRAST INDICATION: TRAUMA.  COMPARISON: May 14, 2024, January 5, 2024 TECHNIQUE: CT examination of the chest, abdomen and pelvis was performed. Multiplanar 2D reformatted images were created from the source data. This examination, like all CT scans performed in the [facility], was performed utilizing techniques to minimize radiation dose exposure, including the use of iterative reconstruction and automated exposure control. Radiation dose length product (DLP) for this visit: 979 mGy-cm IV Contrast: 100 mL of iohexol (OMNIPAQUE) Enteric Contrast: Not administered. FINDINGS: CHEST LUNGS: Wedge-shaped and linear opacities in the lung bases, right lower lobe and right middle lobe favoring compressive atelectasis. PLEURA: New large right pleural fluid small left pleural fluid. HEART/GREAT VESSELS: Mild global cardiomegaly. Dilated hepatic IVC and hepatic veins suggesting possible right heart regurgitation. No thoracic aortic aneurysm. MEDIASTINUM AND BETTY: Unremarkable. CHEST WALL AND LOWER NECK: Unremarkable. ABDOMEN LIVER/BILIARY TREE: Stable hepatomegaly.. GALLBLADDER: Gallbladder caliber remains within normal limits. Mild gallbladder wall edema present without pericholecystic inflammation. Suggesting may be related to systemic etiologies. SPLEEN: Unremarkable. PANCREAS: Cystic changes again noted in the head of the pancreas, stable compared to priors. ADRENAL GLANDS: Unremarkable. KIDNEYS/URETERS: Stable size and appearance of the solid enhancing left renal cortical mass, 1.7 cm. No new findings related to this lesion. STOMACH AND BOWEL: Unremarkable. APPENDIX: No findings to suggest appendicitis. ABDOMINOPELVIC CAVITY: No ascites. Presacral edema present as well. No pneumoperitoneum. No lymphadenopathy. VESSELS: Unremarkable for patient's age. PELVIS REPRODUCTIVE ORGANS: Unremarkable for patient's age. URINARY BLADDER: Unremarkable. ABDOMINAL WALL/INGUINAL REGIONS: Unremarkable. BONES: No acute fracture or suspicious osseous  lesion. No acute fractures. Prior healed right rib fracture, anterior third-fifth ribs     Impression: No acute traumatic abnormality identified. New large dependent right pleural effusion with associated atelectatic changes. Small left effusion. Global cardiomegaly, right greater than left. Dilated hepatic veins and IVC suggesting right heart failure/regurgitation. New ascites. New gallbladder wall thickening. New presacral edema. Correlate with clinical findings for third spacing of fluid. Stable appearance of cystic lesion in the pancreatic head. Stable appearance of solid enhancing left renal cortical mass. I personally discussed this study with Peter Bingham on 7/13/2024 7:40 AM. Workstation performed: [id number]     XR Trauma multiple ([facility] trauma bay ONLY)    Result Date: 7/13/2024  Narrative: CHEST INDICATION:   TRAUMA. COMPARISON: CXR 5/24/2024, chest CT 7/13/2024. EXAM PERFORMED/VIEWS:  XR TRAUMA MULTIPLE. FINDINGS: Mild interstitial edema. Moderate right and small left pleural effusion, better shown on CT. No pneumothorax. Moderate cardiomegaly with left subclavian pacemaker leads in right atrium and right ventricle. Skeleton normal for age.  No acute displaced fractures. Old right rib fractures. Upper abdomen normal.     Impression: No definite acute displaced fracture but see subsequent chest CT. Mild interstitial edema. Moderate right and small left pleural effusion, better shown on CT. Workstation performed: [id number]       Assessment  Principal Problem:    Acute kidney injury superimposed on CKD (chronic kidney disease) stage 3, GFR 30-59 ml/min (Formerly [facility])  Active Problems:    Restless legs syndrome    Acute on chronic heart failure (HCC)    Hyponatremia    Paroxysmal A-fib (HCC)    Recurrent pleural effusion on right    Fatigue      Thank you for allowing us to participate in this patient's care. This pt will follow up with          once discharged.     Counseling / Coordination of Care  Total  floor / unit time spent today 45 minutes.  Greater than 50% of total time was spent with the patient and / or family counseling and / or coordination of care.  A description of the counseling / coordination of care: Review of history, current assessment, development of a plan.    Code Status: Level 1 - Full Code    ** Please Note: Dragon 360 Dictation voice to text software may have been used in the creation of this document. **

## 2024-07-25 NOTE — ASSESSMENT & PLAN NOTE
Patient with history of chronic hyponatremia, presents on admission with sodium of 128.  Baseline Sodium between 128-134  Serum osm 292; U osm 511; Kylie 11  Lipids low HDL  Glucose 153    Plan:   NS fluids  Hold Lasix  Na labs Q6 hrs  Morning cortisol

## 2024-07-25 NOTE — PLAN OF CARE
Problem: PAIN - ADULT  Goal: Verbalizes/displays adequate comfort level or baseline comfort level  Description: Interventions:  - Encourage patient to monitor pain and request assistance  - Assess pain using appropriate pain scale  - Administer analgesics based on type and severity of pain and evaluate response  - Implement non-pharmacological measures as appropriate and evaluate response  - Consider cultural and social influences on pain and pain management  - Notify physician/advanced practitioner if interventions unsuccessful or patient reports new pain  Outcome: Progressing     Problem: INFECTION - ADULT  Goal: Absence or prevention of progression during hospitalization  Description: INTERVENTIONS:  - Assess and monitor for signs and symptoms of infection  - Monitor lab/diagnostic results  - Monitor all insertion sites, i.e. indwelling lines, tubes, and drains  - Monitor endotracheal if appropriate and nasal secretions for changes in amount and color  - [facility] appropriate cooling/warming therapies per order  - Administer medications as ordered  - Instruct and encourage patient and family to use good hand hygiene technique  - Identify and instruct in appropriate isolation precautions for identified infection/condition  Outcome: Progressing  Goal: Absence of fever/infection during neutropenic period  Description: INTERVENTIONS:  - Monitor WBC    Outcome: Progressing     Problem: SAFETY ADULT  Goal: Patient will remain free of falls  Description: INTERVENTIONS:  - Educate patient/family on patient safety including physical limitations  - Instruct patient to call for assistance with activity   - Consult OT/PT to assist with strengthening/mobility   - Keep Call bell within reach  - Keep bed low and locked with side rails adjusted as appropriate  - Keep care items and personal belongings within reach  - Initiate and maintain comfort rounds  - Make Fall Risk Sign visible to staff  - Apply yellow socks and bracelet  for high fall risk patients  - Consider moving patient to room near nurses station  Outcome: Progressing  Goal: Maintain or return to baseline ADL function  Description: INTERVENTIONS:  -  Assess patient's ability to carry out ADLs; assess patient's baseline for ADL function and identify physical deficits which impact ability to perform ADLs (bathing, care of mouth/teeth, toileting, grooming, dressing, etc.)  - Assess/evaluate cause of self-care deficits   - Assess range of motion  - Assess patient's mobility; develop plan if impaired  - Assess patient's need for assistive devices and provide as appropriate  - Encourage maximum independence but intervene and supervise when necessary  - Involve family in performance of ADLs  - Assess for home care needs following discharge   - Consider OT consult to assist with ADL evaluation and planning for discharge  - Provide patient education as appropriate  Outcome: Progressing  Goal: Maintains/Returns to pre admission functional level  Description: INTERVENTIONS:  - Perform AM-PAC 6 Click Basic Mobility/ Daily Activity assessment daily.  - Set and communicate daily mobility goal to care team and patient/family/caregiver.   - Collaborate with rehabilitation services on mobility goals if consulted  - Out of bed for toileting  - Record patient progress and toleration of activity level   Outcome: Progressing     Problem: DISCHARGE PLANNING  Goal: Discharge to home or other facility with appropriate resources  Description: INTERVENTIONS:  - Identify barriers to discharge w/patient and caregiver  - Arrange for needed discharge resources and transportation as appropriate  - Identify discharge learning needs (meds, wound care, etc.)  - Arrange for interpretive services to assist at discharge as needed  - Refer to Case Management Department for coordinating discharge planning if the patient needs post-hospital services based on physician/advanced practitioner order or complex needs  related to functional status, cognitive ability, or social support system  Outcome: Progressing     Problem: Knowledge Deficit  Goal: Patient/family/caregiver demonstrates understanding of disease process, treatment plan, medications, and discharge instructions  Description: Complete learning assessment and assess knowledge base.  Interventions:  - Provide teaching at level of understanding  - Provide teaching via preferred learning methods  Outcome: Progressing

## 2024-07-25 NOTE — ASSESSMENT & PLAN NOTE
Wt Readings from Last 3 Encounters:   07/24/24 71.7 kg (158 lb)   07/22/24 72.5 kg (159 lb 12.8 oz)   07/17/24 72.1 kg (159 lb)       Acute on chronic diastolic (congestive) heart failure (POA) in the setting of KIMBERLEY with chronic failure as evidenced by BNP of 1428, fluid overload, JOHNSON being treated with IV Lasix with daily weights, I&O's and monitoring of her respiratory status

## 2024-07-25 NOTE — ASSESSMENT & PLAN NOTE
Hx right-sided pleural effusion in the setting of bronchiectasis and pulmonary hypertension  Underwent thoracentesis on 7/15/2024 with fluid suggested of transudate by effusion.    Chest x-ray on 7/22/2024 showed new small right-sided pleural effusion.    Chest x-ray on admission appears to show worsening right-sided pleural effusion   Patient reports taking Lasix 40 mg twice daily after discharge from the hospital.   Suspect recurrent pleural effusion secondary to heart failure.    Plan:  Hold Lasix in setting of KIMBERLEY likely 2/2 overdiuresis, per cardio  Consider r/p thoracentesis if doesn't resolve on own

## 2024-07-25 NOTE — ASSESSMENT & PLAN NOTE
POA1.64; (baseline .9-1.1)   Increased weakness, fatigue since last hospital admission  Dry on exam  Pt taking higher Lasix dose since last admission; increased from 20 mg daily to 40 mg BID   Suspect KIMBERLEY secondary to overdiuresis, poor oral intake    Plan:  NS fluids  Hold off on Lasix at this time  R/p BMP this afternoon  Monitor on tele  Avoid hypoperfusion of the kidneys, minimize nephrotoxins  Consider nephrology consultation if worsening KIMBERLEY

## 2024-07-25 NOTE — ED PROVIDER NOTES
History  Chief Complaint   Patient presents with    Fatigue     Pt was discharged last week for hear failure. Per family pt had chest xray on Monday showing fluid overload. Pt now having increased fatigue, generalized edema, and had a near syncopal episode earlier today. Per family pt has gained 2-3lbs in the past 2 days.     Pt is a 78 y.o. female with a PMH of HFpEF who presents to the ED on July 25, 2024 with fatigue. Pt was discharged last week after acute heart failure exacerbation. Per family pt had chest xray on Monday showing right sided pleural effusion. Pt now having increased fatigue, generalized edema, and had a near syncopal episode earlier today. Per family pt has gained 2-3lbs in the past 2 days. Currently denies any pain, SoB, nausea, vomiting, increased work of breathing, fevers, chills. They reported compliance with outpatient medicine. Deny any relieving or aggravating factors. No other reported symptoms at this time.      Fatigue  Associated symptoms: no abdominal pain, no arthralgias, no chest pain, no cough, no dysuria, no fever, no seizures, no shortness of breath and no vomiting        Prior to Admission Medications   Prescriptions Last Dose Informant Patient Reported? Taking?   Cetirizine HCl (ZyrTEC Allergy) 10 MG CAPS 7/24/2024 Self Yes Yes   Sig: Take 10 mg by mouth in the morning   Cholecalciferol 50 MCG (2000 UT) CAPS 7/24/2024 Self Yes Yes   Sig: Take 2,000 Units by mouth 2 (two) times a day   Chromium Picolinate (CHROMIUM PICOLATE PO) 7/24/2024 Self Yes Yes   Sig: Take 1 tablet by mouth daily   Insulin Glargine Solostar (Lantus SoloStar) 100 UNIT/ML SOPN 7/23/2024 Self No Yes   Sig: Inject 0.1 mL (10 Units total) under the skin daily at bedtime   Insulin Pen Needle 31G X 4 MM MISC 7/23/2024 Self No Yes   Sig: Use daily at bedtime   OneTouch Delica Lancets 33G MISC 7/24/2024 Self No Yes   Sig: Check blood sugars once daily. Please substitute with appropriate alternative as covered by  patient's insurance. Dx: E11.65   TURMERIC PO 2024 Self Yes Yes   Sig: Take 1 capsule by mouth 2 (two) times a day   albuterol (ProAir HFA) 90 mcg/act inhaler Past Month Self No Yes   Sig: Inhale 2 puffs every 6 (six) hours as needed for wheezing   apixaban (ELIQUIS) 5 mg 2024 Self No Yes   Sig: Take 1 tablet (5 mg total) by mouth 2 (two) times a day   ascorbic Acid (VITAMIN C) 500 MG CPCR 2024 Self Yes Yes   Sig: Take 500 mg by mouth daily   benzonatate (TESSALON PERLES) 100 mg capsule Not Taking Self No No   Sig: Take 1 capsule (100 mg total) by mouth 3 (three) times a day as needed for cough   Patient not taking: Reported on 2024   dapagliflozin 5 MG TABS 2024 Self No Yes   Sig: Take 1 tablet (5 mg total) by mouth daily   diltiazem (CARDIZEM CD) 120 mg 24 hr capsule 2024 Self Yes Yes   Sig: Take 120 mg by mouth daily   ezetimibe (ZETIA) 10 mg tablet 2024 Self No Yes   Sig: Take 1 tablet (10 mg total) by mouth daily at bedtime   famotidine (PEPCID) 20 mg tablet 2024 Self Yes Yes   Sig: Take 20 mg by mouth 2 (two) times a day M, W, F in the AM   flecainide (TAMBOCOR) 100 mg tablet 2024 Self No Yes   Sig: Take 1 tablet (100 mg total) by mouth 2 (two) times a day   Patient taking differently: Take 150 mg by mouth 2 (two) times a day   furosemide (LASIX) 20 mg tablet 2024 Self No Yes   Sig: Take 2 tablets (40 mg total) by mouth daily take 2 tablet by mouth daily if needed for shortness of breath WEIGHT GAIN 3 LBS IN 1 DAY OR LOWER LEG SWELLING   gabapentin (NEURONTIN) 300 mg capsule 2024 Self No Yes   Sig: take 1 capsule by mouth at bedtime   glucose blood (OneTouch Verio) test strip 2024 Self No Yes   Sig: Check blood sugars once daily. Please substitute with appropriate alternative as covered by patient's insurance. Dx: E11.65   ipratropium (ATROVENT) 0.03 % nasal spray Unknown Self No No   Si sprays into each nostril 3 (three) times a day Begin once  per day, then progress to twice per day. Progress to three times a day as tolerated.   Patient taking differently: 2 sprays into each nostril if needed Begin once per day, then progress to twice per day. Progress to three times a day as tolerated.   metoprolol succinate (TOPROL-XL) 25 mg 24 hr tablet 7/24/2024  No Yes   Sig: take 3 tablets by mouth every 12 hours   predniSONE 10 mg tablet Not Taking Self Yes No   Patient not taking: Reported on 7/22/2024   rOPINIRole (REQUIP) 1 mg tablet 7/23/2024 Self No Yes   Sig: Take 2 tablets (2 mg total) by mouth daily at bedtime   sodium chloride 3 % inhalation solution 7/23/2024 Self No Yes   Sig: Take 4 mL by nebulization as needed for cough   tiotropium (Spiriva Respimat) 2.5 MCG/ACT AERS inhaler 7/23/2024 Self No Yes   Sig: Inhale 2 puffs daily   zolpidem (AMBIEN) 10 mg tablet 7/23/2024 Self No Yes   Sig: Take 1 tablet (10 mg total) by mouth daily at bedtime as needed for sleep      Facility-Administered Medications: None       Past Medical History:   Diagnosis Date    Actinic keratosis     last assessed - 06Jun2014    Arthritis     Atrial fibrillation with rapid ventricular response (HCC)     last assessed - 26Apr2016    Atrial fibrillation with rapid ventricular response (HCC) 04/19/2016    Basal cell carcinoma     Benign colon polyp     last assessed - 27Apr2015    Bronchiectasis without complication (HCC)     CHF (congestive heart failure) (HCC) 06/2022    Chronic diastolic CHF (congestive heart failure) (HCC)     Disease of thyroid gland     Effusion of knee joint right     Resolved - 19Apr2016    Esophageal reflux     Fibromyalgia     last assessed - [id number]    Fibromyalgia, primary     GERD (gastroesophageal reflux disease)     Hyperlipidemia     Hypertension     Hyponatremia     Malignant neoplasm of thyroid gland (HCC)     Meningioma (HCC)     MGUS (monoclonal gammopathy of unknown significance)     Palpitations     last assessed - 30Apr2013    Peroneal  tendonitis, right     last assessed - 02Oct2013    Right lumbar radiculopathy 03/17/2016    Thyroid cancer (HCC)     Thyroid nodule     Trochanteric bursitis of left hip 03/09/2018       Past Surgical History:   Procedure Laterality Date    CARDIAC ELECTROPHYSIOLOGY STUDY AND ABLATION  08/2022    CATARACT EXTRACTION Bilateral     COLONOSCOPY  03/2018    COLONOSCOPY W/ POLYPECTOMY  2021    repeat in 5 years    EYE SURGERY      OOPHORECTOMY      DC NEUROPLASTY &/TRANSPOS MEDIAN NRV CARPAL TUNNE Right 11/14/2019    Procedure: RELEASE CARPAL TUNNEL;  Surgeon: Onesimo Tate MD;  Location: [facility];  Service: Orthopedics    DC TOTAL THYROID LOBECTOMY UNI W/WO ISTHMUSECTOMY Left 12/16/2020    Procedure: Left THYROID LOBECTOMY;  Surgeon: Jhony Bhatt MD;  Location: [facility];  Service: ENT    RECTAL POLYPECTOMY      REPLACEMENT TOTAL KNEE Left     last assessed - 27Apr2015    TONSILLECTOMY      TOTAL ABDOMINAL HYSTERECTOMY      TUBAL LIGATION      US GUIDED THYROID BIOPSY  10/14/2020       Family History   Problem Relation Age of Onset    Heart disease Mother     Diabetes Mother     Heart disease Father     Coronary artery disease Father     Stroke Father         cerebrovascular accident    Heart attack Father         myocardial infarction    Sudden death Father         scd    Other Family         Back disorder    Coronary artery disease Family     Neuropathy Family     Osteoporosis Family     No Known Problems Daughter     No Known Problems Maternal Grandmother     No Known Problems Maternal Grandfather     No Known Problems Paternal Grandmother     No Known Problems Paternal Grandfather     Cancer Maternal Uncle     Breast cancer Maternal Aunt 65    No Known Problems Son     No Known Problems Maternal Aunt     No Known Problems Maternal Aunt     No Known Problems Maternal Aunt     No Known Problems Paternal Aunt     No Known Problems Paternal Aunt     Anuerysm Neg Hx     Clotting disorder Neg Hx     Arrhythmia Neg Hx      Heart failure Neg Hx      I have reviewed and agree with the history as documented.    E-Cigarette/Vaping    E-Cigarette Use Never User      E-Cigarette/Vaping Substances    Nicotine No     THC No     CBD No     Flavoring No     Other No     Unknown No      Social History     Tobacco Use    Smoking status: Never    Smokeless tobacco: Never   Vaping Use    Vaping status: Never Used   Substance Use Topics    Alcohol use: Not Currently    Drug use: Never        Review of Systems   Constitutional:  Positive for fatigue. Negative for chills and fever.   HENT:  Negative for ear pain and sore throat.    Eyes:  Negative for pain and visual disturbance.   Respiratory:  Negative for cough and shortness of breath.    Cardiovascular:  Negative for chest pain and palpitations.   Gastrointestinal:  Negative for abdominal pain and vomiting.   Genitourinary:  Negative for dysuria and hematuria.   Musculoskeletal:  Negative for arthralgias and back pain.   Skin:  Negative for color change and rash.   Neurological:  Negative for seizures and syncope.   All other systems reviewed and are negative.      Physical Exam  ED Triage Vitals   Temperature Pulse Respirations Blood Pressure SpO2   07/24/24 1546 07/24/24 1546 07/24/24 1546 07/24/24 1546 07/24/24 1546   97.8 °F (36.6 °C) 59 18 113/64 95 %      Temp Source Heart Rate Source Patient Position - Orthostatic VS BP Location FiO2 (%)   07/24/24 1546 07/24/24 1546 07/24/24 1546 07/24/24 1546 --   Oral Monitor Sitting Right arm       Pain Score       07/24/24 1912       3             Orthostatic Vital Signs  Vitals:    07/25/24 0731 07/25/24 1115 07/25/24 1500 07/25/24 1850   BP: 103/58 145/94 106/81 135/63   Pulse: 65 69 73 80   Patient Position - Orthostatic VS: Lying Sitting Sitting        Physical Exam  Vitals and nursing note reviewed.   Constitutional:       General: She is not in acute distress.     Appearance: She is well-developed.   HENT:      Head: Normocephalic and  atraumatic.   Eyes:      Conjunctiva/sclera: Conjunctivae normal.   Cardiovascular:      Rate and Rhythm: Normal rate and regular rhythm.      Heart sounds: No murmur heard.  Pulmonary:      Effort: Pulmonary effort is normal. No respiratory distress.      Breath sounds: Rales present. No wheezing.   Chest:      Chest wall: No tenderness.   Abdominal:      Palpations: Abdomen is soft.      Tenderness: There is no abdominal tenderness.   Musculoskeletal:         General: No swelling.      Cervical back: Neck supple.   Skin:     General: Skin is warm and dry.      Capillary Refill: Capillary refill takes less than 2 seconds.   Neurological:      Mental Status: She is alert.   Psychiatric:         Mood and Affect: Mood normal.         ED Medications  Medications   apixaban (ELIQUIS) tablet 5 mg (5 mg Oral Given 7/25/24 1850)   ascorbic acid (VITAMIN C) tablet 500 mg (500 mg Oral Given 7/25/24 0950)   loratadine (CLARITIN) tablet 10 mg (10 mg Oral Given 7/25/24 0949)   diltiazem (CARDIZEM CD) 24 hr capsule 120 mg (120 mg Oral Given 7/25/24 0950)   ezetimibe (ZETIA) tablet 10 mg (10 mg Oral Given 7/24/24 2134)   gabapentin (NEURONTIN) capsule 300 mg (300 mg Oral Given 7/24/24 2135)   metoprolol succinate (TOPROL-XL) 24 hr tablet 75 mg (75 mg Oral Given 7/25/24 1850)   zolpidem (AMBIEN) tablet 10 mg (10 mg Oral Given 7/24/24 2336)   insulin glargine (LANTUS) subcutaneous injection 10 Units 0.1 mL (10 Units Subcutaneous Given 7/24/24 2135)   insulin lispro (HumALOG/ADMELOG) 100 units/mL subcutaneous injection 1-6 Units ( Subcutaneous Not Given 7/25/24 1829)   lidocaine (LIDODERM) 5 % patch 1 patch (1 patch Topical Not Given 7/25/24 0939)   senna-docusate sodium (SENOKOT S) 8.6-50 mg per tablet 1 tablet (1 tablet Oral Not Given 7/24/24 2135)   polyethylene glycol (MIRALAX) packet 17 g (17 g Oral Not Given 7/25/24 0940)   sodium chloride 0.9 % infusion (75 mL/hr Intravenous New Bag 7/25/24 0949)   flecainide (TAMBOCOR)  tablet 150 mg (150 mg Oral Given 7/25/24 1850)   acetaminophen (TYLENOL) tablet 975 mg (975 mg Oral Given 7/24/24 1912)       Diagnostic Studies  Results Reviewed       Procedure Component Value Units Date/Time    Osmolality, Urine, random [[id number]]  (Normal) Collected: 07/25/24 0954    Lab Status: Final result Specimen: Urine, Clean Catch Updated: 07/25/24 1025     Osmolality, Ur 511 mmol/KG     Sodium, urine, random [[id number]] Collected: 07/25/24 0954    Lab Status: Final result Specimen: Urine, Clean Catch Updated: 07/25/24 1015     Sodium, Ur 11    Basic metabolic panel [[id number]]  (Abnormal) Collected: 07/25/24 0436    Lab Status: Final result Specimen: Blood from Hand, Right Updated: 07/25/24 0612     Sodium 124 mmol/L      Potassium 4.7 mmol/L      Chloride 95 mmol/L      CO2 20 mmol/L      ANION GAP 9 mmol/L      BUN 54 mg/dL      Creatinine 1.91 mg/dL      Glucose 143 mg/dL      Calcium 8.7 mg/dL      eGFR 24 ml/min/1.73sq m     Narrative:      National Kid[employer] Foundation guidelines for Chronic Kidney Disease (CKD):     Stage 1 with normal or high GFR (GFR > 90 mL/min/1.73 square meters)    Stage 2 Mild CKD (GFR = 60-89 mL/min/1.73 square meters)    Stage 3A Moderate CKD (GFR = 45-59 mL/min/1.73 square meters)    Stage 3B Moderate CKD (GFR = 30-44 mL/min/1.73 square meters)    Stage 4 Severe CKD (GFR = 15-29 mL/min/1.73 square meters)    Stage 5 End Stage CKD (GFR <15 mL/min/1.73 square meters)  Note: GFR calculation is accurate only with a steady state creatinine    Phosphorus [[id number]]  (Abnormal) Collected: 07/25/24 0436    Lab Status: Final result Specimen: Blood from Hand, Right Updated: 07/25/24 0612     Phosphorus 4.9 mg/dL     Magnesium [[id number]]  (Normal) Collected: 07/25/24 0436    Lab Status: Final result Specimen: Blood from Hand, Right Updated: 07/25/24 0612     Magnesium 2.4 mg/dL     CBC and differential [[id number]]  (Abnormal) Collected: 07/25/24 1566    Lab Status: Final  "result Specimen: Blood from Hand, Right Updated: 07/25/24 0545     WBC 8.30 Thousand/uL      RBC 4.05 Million/uL      Hemoglobin 11.9 g/dL      Hematocrit 37.2 %      MCV 92 fL      MCH 29.4 pg      MCHC 32.0 g/dL      RDW 13.9 %      MPV 10.1 fL      Platelets 281 Thousands/uL      nRBC 0 /100 WBCs      Segmented % 61 %      Immature Grans % 0 %      Lymphocytes % 22 %      Monocytes % 14 %      Eosinophils Relative 2 %      Basophils Relative 1 %      Absolute Neutrophils 5.09 Thousands/µL      Absolute Immature Grans 0.03 Thousand/uL      Absolute Lymphocytes 1.79 Thousands/µL      Absolute Monocytes 1.17 Thousand/µL      Eosinophils Absolute 0.15 Thousand/µL      Basophils Absolute 0.07 Thousands/µL     Osmolality-\"If this is regarding a toxic alcohol, please STOP and consult medical  for further guidance.\" [[id number]]  (Normal) Collected: 07/24/24 2249    Lab Status: Final result Specimen: Blood from Arm, Right Updated: 07/24/24 2357     Osmolality Serum 296 mmol/KG     HS Troponin I 4hr [[id number]]  (Normal) Collected: 07/24/24 2029    Lab Status: Final result Specimen: Blood from Arm, Right Updated: 07/24/24 2105     hs TnI 4hr 9 ng/L      Delta 4hr hsTnI 1 ng/L     Fingerstick Glucose (POCT) [[id number]]  (Normal) Collected: 07/24/24 2057    Lab Status: Final result Specimen: Blood Updated: 07/24/24 2058     POC Glucose 140 mg/dl     Cortisol Level, AM Specimen [[id number]]     Lab Status: No result Specimen: Blood     HS Troponin I 2hr [[id number]]  (Normal) Collected: 07/24/24 1751    Lab Status: Final result Specimen: Blood from Arm, Right Updated: 07/24/24 1818     hs TnI 2hr 9 ng/L      Delta 2hr hsTnI 1 ng/L     HS Troponin 0hr (reflex protocol) [[id number]]  (Normal) Collected: 07/24/24 1551    Lab Status: Final result Specimen: Blood from Arm, Right Updated: 07/24/24 1628     hs TnI 0hr 8 ng/L     Comprehensive metabolic panel [[id number]]  (Abnormal) Collected: 07/24/24 1551    Lab Status: " Final result Specimen: Blood from Arm, Right Updated: 07/24/24 1621     Sodium 126 mmol/L      Potassium 4.5 mmol/L      Chloride 93 mmol/L      CO2 23 mmol/L      ANION GAP 10 mmol/L      BUN 50 mg/dL      Creatinine 1.64 mg/dL      Glucose 153 mg/dL      Calcium 9.0 mg/dL      AST 46 U/L      ALT 52 U/L      Alkaline Phosphatase 71 U/L      Total Protein 6.5 g/dL      Albumin 3.8 g/dL      Total Bilirubin 0.90 mg/dL      eGFR 29 ml/min/1.73sq m     Narrative:      National Kidney Disease Foundation guidelines for Chronic Kidney Disease (CKD):     Stage 1 with normal or high GFR (GFR > 90 mL/min/1.73 square meters)    Stage 2 Mild CKD (GFR = 60-89 mL/min/1.73 square meters)    Stage 3A Moderate CKD (GFR = 45-59 mL/min/1.73 square meters)    Stage 3B Moderate CKD (GFR = 30-44 mL/min/1.73 square meters)    Stage 4 Severe CKD (GFR = 15-29 mL/min/1.73 square meters)    Stage 5 End Stage CKD (GFR <15 mL/min/1.73 square meters)  Note: GFR calculation is accurate only with a steady state creatinine    CBC and differential [[id number]]  (Abnormal) Collected: 07/24/24 1551    Lab Status: Final result Specimen: Blood from Arm, Right Updated: 07/24/24 1604     WBC 7.48 Thousand/uL      RBC 4.00 Million/uL      Hemoglobin 11.8 g/dL      Hematocrit 36.6 %      MCV 92 fL      MCH 29.5 pg      MCHC 32.2 g/dL      RDW 14.0 %      MPV 9.9 fL      Platelets 282 Thousands/uL      nRBC 0 /100 WBCs      Segmented % 63 %      Immature Grans % 0 %      Lymphocytes % 20 %      Monocytes % 13 %      Eosinophils Relative 3 %      Basophils Relative 1 %      Absolute Neutrophils 4.73 Thousands/µL      Absolute Immature Grans 0.03 Thousand/uL      Absolute Lymphocytes 1.49 Thousands/µL      Absolute Monocytes 0.97 Thousand/µL      Eosinophils Absolute 0.21 Thousand/µL      Basophils Absolute 0.05 Thousands/µL                    XR chest 1 view portable   ED Interpretation by Odalis Bronson MD (07/24 1835)   Worsening right plural effusion,  mild pulm vasc congestion      Final Result by Bertha Richter MD (07/24 2035)      Mild pulmonary venous congestion with slight increase in moderate right effusion.      Mild subsegmental atelectasis in the right midlung.            Workstation performed: [id number]               Procedures  Procedures      ED Course                                       Medical Decision Making  Patient presents for fatigue and generalized weakness and found to be volume overloaded with a worsening right sided pleural effusion likely secondary to heart failure, based on history, exam, and work up. Patient noted to be drowsy at home with worsening fatigue and SoB. Labs concerning for worsening renal function and hyponatremia. Pt requires admission for diuresis, correction of electrolyte abnormalities, and potential thoracentesis. Discussed case with SLIM who agreed with in hospital admission. Admission and plan of care discussed with patient and daughter at bedside who agreed with dispo and plan.    Amount and/or Complexity of Data Reviewed  Labs: ordered.  Radiology: ordered and independent interpretation performed.    Risk  Decision regarding hospitalization.          Disposition  Final diagnoses:   Acute kidney injury (HCC)   Weakness   (HFpEF) heart failure with preserved ejection fraction (HCC)   Hyponatremia   Ascites   Pleural effusion     Time reflects when diagnosis was documented in both MDM as applicable and the Disposition within this note       Time User Action Codes Description Comment    7/24/2024  5:23 PM Santy Corona [N17.9] Acute kidney injury (HCC)     7/24/2024  5:23 PM Santy Corona [R53.1] Weakness     7/24/2024  5:24 PM Santy Corona [I50.30] (HFpEF) heart failure with preserved ejection fraction (HCC)     7/24/2024  5:24 PM Santy Corona [E87.1] Hyponatremia     7/24/2024  5:24 PM Santy Corona [R18.8] Ascites     7/24/2024  5:24 PM Santy Corona [J90]  Pleural effusion           ED Disposition       ED Disposition   Admit    Condition   Stable    Date/Time   Wed Jul 24, 2024  5:23 PM    Comment   Case was discussed with Dr. Srivastava and the patient's admission status was agreed to be Admission Status: inpatient status to the service of Dr. Srivastava .               Follow-up Information    None         Current Discharge Medication List        CONTINUE these medications which have NOT CHANGED    Details   albuterol (ProAir HFA) 90 mcg/act inhaler Inhale 2 puffs every 6 (six) hours as needed for wheezing  Qty: 8.5 g, Refills: 3    Comments: Substitution to a formulary equivalent within the same pharmaceutical class is authorized.  Associated Diagnoses: Chronic cough      apixaban (ELIQUIS) 5 mg Take 1 tablet (5 mg total) by mouth 2 (two) times a day  Qty: 60 tablet, Refills: 3    Comments: Lot # [id number]  Associated Diagnoses: Paroxysmal atrial fibrillation ([facility])      ascorbic Acid (VITAMIN C) 500 MG CPCR Take 500 mg by mouth daily      Cetirizine HCl (ZyrTEC Allergy) 10 MG CAPS Take 10 mg by mouth in the morning      Cholecalciferol 50 MCG (2000 UT) CAPS Take 2,000 Units by mouth 2 (two) times a day      Chromium Picolinate (CHROMIUM PICOLATE PO) Take 1 tablet by mouth daily      dapagliflozin 5 MG TABS Take 1 tablet (5 mg total) by mouth daily  Qty: 30 tablet, Refills: 5    Associated Diagnoses: Chronic diastolic CHF (congestive heart failure) ([facility]); Type 2 diabetes mellitus with microalbuminuria, without long-term current use of insulin ([facility])      diltiazem (CARDIZEM CD) 120 mg 24 hr capsule Take 120 mg by mouth daily      ezetimibe (ZETIA) 10 mg tablet Take 1 tablet (10 mg total) by mouth daily at bedtime  Qty: 30 tablet, Refills: 0    Associated Diagnoses: Hyperlipidemia      famotidine (PEPCID) 20 mg tablet Take 20 mg by mouth 2 (two) times a day M, W, F in the AM      flecainide (TAMBOCOR) 100 mg tablet Take 1 tablet (100 mg total) by mouth 2 (two)  times a day  Qty: 60 tablet, Refills: 5    Associated Diagnoses: Paroxysmal atrial fibrillation (HCC)      furosemide (LASIX) 20 mg tablet Take 2 tablets (40 mg total) by mouth daily take 2 tablet by mouth daily if needed for shortness of breath WEIGHT GAIN 3 LBS IN 1 DAY OR LOWER LEG SWELLING  Qty: 60 tablet, Refills: 0    Associated Diagnoses: Persistent atrial fibrillation (HCC)      gabapentin (NEURONTIN) 300 mg capsule take 1 capsule by mouth at bedtime  Qty: 90 capsule, Refills: 1    Associated Diagnoses: Peripheral polyneuropathy      glucose blood (OneTouch Verio) test strip Check blood sugars once daily. Please substitute with appropriate alternative as covered by patient's insurance. Dx: E11.65  Qty: 100 each, Refills: 3    Associated Diagnoses: Type 2 diabetes mellitus with microalbuminuria, without long-term current use of insulin ([facility])      Insulin Glargine Solostar (Lantus SoloStar) 100 UNIT/ML SOPN Inject 0.1 mL (10 Units total) under the skin daily at bedtime  Qty: 15 mL, Refills: 1    Associated Diagnoses: Type 2 diabetes mellitus with microalbuminuria, without long-term current use of insulin ([facility])      Insulin Pen Needle 31G X 4 MM MISC Use daily at bedtime  Qty: 100 each, Refills: 2    Associated Diagnoses: Type 2 diabetes mellitus with microalbuminuria, without long-term current use of insulin ([facility])      metoprolol succinate (TOPROL-XL) 25 mg 24 hr tablet take 3 tablets by mouth every 12 hours  Qty: 540 tablet, Refills: 2    Associated Diagnoses: Paroxysmal atrial fibrillation ([facility])      OneTouch Delica Lancets 33G MISC Check blood sugars once daily. Please substitute with appropriate alternative as covered by patient's insurance. Dx: E11.65  Qty: 100 each, Refills: 3    Associated Diagnoses: Type 2 diabetes mellitus with microalbuminuria, without long-term current use of insulin ([facility])      rOPINIRole (REQUIP) 1 mg tablet Take 2 tablets (2 mg total) by mouth daily at bedtime  Qty: 180 tablet,  Refills: 1    Associated Diagnoses: RLS (restless legs syndrome)      sodium chloride 3 % inhalation solution Take 4 mL by nebulization as needed for cough  Qty: 152 mL, Refills: 2    Associated Diagnoses: Bronchiectasis without complication (HCC)      tiotropium (Spiriva Respimat) 2.5 MCG/ACT AERS inhaler Inhale 2 puffs daily  Qty: 4 g, Refills: 2    Associated Diagnoses: Chronic rhinitis; Seasonal allergic rhinitis due to pollen; Chronic cough      TURMERIC PO Take 1 capsule by mouth 2 (two) times a day      zolpidem (AMBIEN) 10 mg tablet Take 1 tablet (10 mg total) by mouth daily at bedtime as needed for sleep  Qty: 90 tablet, Refills: 1    Associated Diagnoses: Insomnia due to medical condition      benzonatate (TESSALON PERLES) 100 mg capsule Take 1 capsule (100 mg total) by mouth 3 (three) times a day as needed for cough  Qty: 20 capsule, Refills: 0    Associated Diagnoses: Chronic diastolic CHF (congestive heart failure) (HCC)      ipratropium (ATROVENT) 0.03 % nasal spray 2 sprays into each nostril 3 (three) times a day Begin once per day, then progress to twice per day. Progress to three times a day as tolerated.  Qty: 90 mL, Refills: 3    Associated Diagnoses: Vasomotor rhinitis; Chronic rhinitis      predniSONE 10 mg tablet            No discharge procedures on file.    PDMP Review         Value Time User    PDMP Reviewed  Yes 7/12/2024  6:49 AM Naveen Monsivais MD             ED Provider  Attending physically available and evaluated Farnaz Martin. I managed the patient along with the ED Attending.    Electronically Signed by           Santy Corona MD  07/25/24 2014

## 2024-07-25 NOTE — PROGRESS NOTES
[facility]  Progress Note  Name: Farnaz Martin I  MRN: [id number]  Unit/Bed#: -01 I Date of Admission: 7/24/2024   Date of Service: 7/25/2024 I Hospital Day: 1    Assessment & Plan   * Fatigue  Assessment & Plan  Increased fatigue, weakness, poor oral intake since last hospitalization  Presyncope episode day of admission  Pt's Lasix dose increased from 20 to 40 mg daily since last admission   Suspect multifactorial, related to overdiuresis, poor oral intake, KIMBERLEY    Plan:   Hold Lasix at this time, per cardio  Gentle fluids  PT/OT consult  Orthostatic vitals  Cardiac interrogation  Consider geriatric consult      Acute kidney injury superimposed on CKD (chronic kidney disease) stage 3, GFR 30-59 ml/min ([facility])  Assessment & Plan    POA1.64; (baseline .9-1.1)   Increased weakness, fatigue since last hospital admission  Dry on exam  Pt taking higher Lasix dose since last admission; increased from 20 mg daily to 40 mg BID   Suspect KIMBERLEY secondary to overdiuresis, poor oral intake    Plan:  NS fluids  Hold off on Lasix at this time  R/p BMP this afternoon  Monitor on tele  Avoid hypoperfusion of the kidneys, minimize nephrotoxins  Consider nephrology consultation if worsening KIMBERLEY      Recurrent pleural effusion on right  Assessment & Plan  Hx right-sided pleural effusion in the setting of bronchiectasis and pulmonary hypertension  Underwent thoracentesis on 7/15/2024 with fluid suggested of transudate by effusion.    Chest x-ray on 7/22/2024 showed new small right-sided pleural effusion.    Chest x-ray on admission appears to show worsening right-sided pleural effusion   Patient reports taking Lasix 40 mg twice daily after discharge from the hospital.   Suspect recurrent pleural effusion secondary to heart failure.    Plan:  Hold Lasix in setting of KIMBERLEY likely 2/2 overdiuresis, per cardio  Consider r/p thoracentesis if doesn't resolve on own    Hyponatremia  Assessment & Plan  Patient  with history of chronic hyponatremia, presents on admission with sodium of 128.  Baseline Sodium between 128-134  Serum osm 292; U osm 511; Kylie 11  Lipids low HDL  Glucose 153    Plan:   NS fluids  Hold Lasix  Na labs Q6 hrs  Morning cortisol      Acute on chronic heart failure (HCC)  Assessment & Plan  Wt Readings from Last 3 Encounters:   07/25/24 73.2 kg (161 lb 6.4 oz)   07/22/24 72.5 kg (159 lb 12.8 oz)   07/17/24 72.1 kg (159 lb)       BNP elevated at 1,428 on admission    Plan:  Strict I/Os  Daily weights  Rest of plan as KIMBERLEY        Paroxysmal A-fib ([facility])  Assessment & Plan  Afib-  S/p ablation in 03/2021.   EKG-Sinus rhythm with frequent Premature atrial complexes with 1st degree A-V block with frequent ventricular-paced complexes  Low voltage QRS    Plan:  Eliquis 5 mg twice daily  Continue flecainide 150 mg twice daily and metoprolol succinate 75 mg twice daily  Continue diltiazem 120 mg daily    Restless legs syndrome  Assessment & Plan  Continue ropinirole               VTE Pharmacologic Prophylaxis: VTE Score: 4 Moderate Risk (Score 3-4) - Pharmacological DVT Prophylaxis Ordered: apixaban (Eliquis).    Mobility:   [facility] Achieved: 1: Laying in bed      Patient Centered Rounds: I performed bedside rounds with nursing staff today.  Discussions with Specialists or Other Care Team Provider: Cardiology    Education and Discussions with Family / Patient: Updated  (daughter) at bedside.    Current Length of Stay: 1 day(s)  Current Patient Status: Inpatient   Discharge Plan: Anticipate discharge in 48-72 hrs to discharge location to be determined pending rehab evaluations.    Code Status: Level 1 - Full Code    Subjective:   Spoke to pt at bedside. She continues to endorse weakness but no longer has shortness of breath. Pt has been experiencing lightheadedness when ambulating since last admission. She denies any further episodes of syncope between the time of her last hospitalization and this  present admission for a pre-syncopal episode. Pt denies chest pain, heart palpitations, nausea, fever, chills.    Objective:     Vitals:   Temp (24hrs), Av.2 °F (36.8 °C), Min:97.6 °F (36.4 °C), Max:99.1 °F (37.3 °C)    Temp:  [97.6 °F (36.4 °C)-99.1 °F (37.3 °C)] 98 °F (36.7 °C)  HR:  [59-69] 69  Resp:  [16-20] 16  BP: ()/(50-94) 145/94  SpO2:  [91 %-97 %] 97 %  Body mass index is 29.52 kg/m².     Input and Output Summary (last 24 hours):   No intake or output data in the 24 hours ending 24 1415    Physical Exam:   Physical Exam  Constitutional:       General: She is not in acute distress.  HENT:      Mouth/Throat:      Mouth: Mucous membranes are dry.   Cardiovascular:      Rate and Rhythm: Normal rate and regular rhythm.      Heart sounds: No murmur heard.     No friction rub. No gallop.   Pulmonary:      Effort: Pulmonary effort is normal.      Comments: Decreased breath sounds on right lower lobe  Abdominal:      General: Abdomen is flat. Bowel sounds are normal. There is no distension.      Palpations: Abdomen is soft.      Tenderness: There is no abdominal tenderness. There is no guarding.   Musculoskeletal:      Comments: No bilateral lower extremity edema   Neurological:      Mental Status: She is alert and oriented to person, place, and time.          Additional Data:     Labs:  Results from last 7 days   Lab Units 24  0436   WBC Thousand/uL 8.30   HEMOGLOBIN g/dL 11.9   HEMATOCRIT % 37.2   PLATELETS Thousands/uL 281   SEGS PCT % 61   LYMPHO PCT % 22   MONO PCT % 14*   EOS PCT % 2     Results from last 7 days   Lab Units 24  1222 24  0436 24  1551   SODIUM mmol/L 127* 124* 126*   POTASSIUM mmol/L  --  4.7 4.5   CHLORIDE mmol/L  --  95* 93*   CO2 mmol/L  --  20* 23   BUN mg/dL  --  54* 50*   CREATININE mg/dL  --  1.91* 1.64*   ANION GAP mmol/L  --  9 10   CALCIUM mg/dL  --  8.7 9.0   ALBUMIN g/dL  --   --  3.8   TOTAL BILIRUBIN mg/dL  --   --  0.90   ALK PHOS U/L  --    --  71   ALT U/L  --   --  52   AST U/L  --   --  46*   GLUCOSE RANDOM mg/dL  --  143* 153*         Results from last 7 days   Lab Units 07/25/24  1117 07/25/24  0737 07/24/24 2057   POC GLUCOSE mg/dl 189* 112 140               Lines/Drains:  Invasive Devices       Peripheral Intravenous Line  Duration             Peripheral IV 07/24/24 Right Antecubital <1 day                      Telemetry:  Telemetry Orders (From admission, onward)               24 Hour Telemetry Monitoring  Continuous x 24 Hours (Telem)        Question:  Reason for 24 Hour Telemetry  Answer:  Decompensated CHF- and any one of the following: continuous diuretic infusion or total diuretic dose >200 mg daily, associated electrolyte derangement (I.e. K < 3.0), ionotropic drip (continuous infusion), hx of ventricular arrhythmia, or new EF < 35%                     Telemetry Reviewed:  Pt not hooked up to tele at time of evaluation  Indication for Continued Telemetry Use: Acute CHF on >200 mg lasix/day or equivalent dose or with new reduced EF.              Imaging: Reviewed radiology reports from this admission including: chest xray  XR chest 1 view portable   ED Interpretation by Odalis Bronson MD (07/24 1835)   Worsening right plural effusion, mild pulm vasc congestion      Final Result by Bertha Richter MD (07/24 2035)      Mild pulmonary venous congestion with slight increase in moderate right effusion.      Mild subsegmental atelectasis in the right midlung.            Workstation performed: [id number]              Recent Cultures (last 7 days):         Last 24 Hours Medication List:   Current Facility-Administered Medications   Medication Dose Route Frequency Provider Last Rate    apixaban  5 mg Oral BID Yenny Spencer MD      ascorbic acid  500 mg Oral Daily Yenny Spencer MD      diltiazem  120 mg Oral Daily Yenny Spencer MD      ezetimibe  10 mg Oral HS Yenny Spencer MD      flecainide  150 mg Oral BID JENNY Sapp       gabapentin  300 mg Oral HS Yenny Spencer MD      insulin glargine  10 Units Subcutaneous HS Yenny Spencer MD      insulin lispro  1-6 Units Subcutaneous 4x Daily (PC & HS) Yenny Spencer MD      lidocaine  1 patch Topical Daily Yenny Spencer MD      loratadine  10 mg Oral Daily Yenny Spencer MD      metoprolol succinate  75 mg Oral Q12H Yenny Spencer MD      polyethylene glycol  17 g Oral Daily Yenny Spencer MD      senna-docusate sodium  1 tablet Oral HS Yenny Spencer MD      sodium chloride  75 mL/hr Intravenous Continuous JENNY Sapp 75 mL/hr (07/25/24 0978)    zolpidem  10 mg Oral HS PRN Yenny Spencer MD          Today, Patient Was Seen By: Holly Louise MD    **Please Note: This note may have been constructed using a voice recognition system.**

## 2024-07-25 NOTE — PROGRESS NOTES
Patient:  TANISHA LEVINE    MRN:  [id number]    [place] Request ID:  [id number]    Level of care reserved:  Home Health Agency    Partner Reserved:  [place] [address] [phone]    Clinical needs requested:    Geography searched:  [phone]    Start of Service:    Request sent:  10:08am EDT on 7/25/2024 by Lenora Denny    Partner reserved:  10:14am EDT on 7/25/2024 by Lenora Denny    Choice list shared:  10:13am EDT on 7/25/2024 by Lenora Denny

## 2024-07-26 LAB
ANION GAP SERPL CALCULATED.3IONS-SCNC: 7 MMOL/L (ref 4–13)
ANION GAP SERPL CALCULATED.3IONS-SCNC: 8 MMOL/L (ref 4–13)
ANION GAP SERPL CALCULATED.3IONS-SCNC: 9 MMOL/L (ref 4–13)
BUN SERPL-MCNC: 45 MG/DL (ref 5–25)
BUN SERPL-MCNC: 48 MG/DL (ref 5–25)
BUN SERPL-MCNC: 51 MG/DL (ref 5–25)
CALCIUM SERPL-MCNC: 8.7 MG/DL (ref 8.4–10.2)
CALCIUM SERPL-MCNC: 8.8 MG/DL (ref 8.4–10.2)
CALCIUM SERPL-MCNC: 8.9 MG/DL (ref 8.4–10.2)
CHLORIDE SERPL-SCNC: 96 MMOL/L (ref 96–108)
CO2 SERPL-SCNC: 23 MMOL/L (ref 21–32)
CO2 SERPL-SCNC: 23 MMOL/L (ref 21–32)
CO2 SERPL-SCNC: 24 MMOL/L (ref 21–32)
CORTIS AM PEAK SERPL-MCNC: 20 UG/DL (ref 6.7–22.6)
CREAT SERPL-MCNC: 1.25 MG/DL (ref 0.6–1.3)
CREAT SERPL-MCNC: 1.39 MG/DL (ref 0.6–1.3)
CREAT SERPL-MCNC: 1.44 MG/DL (ref 0.6–1.3)
ERYTHROCYTE [DISTWIDTH] IN BLOOD BY AUTOMATED COUNT: 13.9 % (ref 11.6–15.1)
GFR SERPL CREATININE-BSD FRML MDRD: 34 ML/MIN/1.73SQ M
GFR SERPL CREATININE-BSD FRML MDRD: 36 ML/MIN/1.73SQ M
GFR SERPL CREATININE-BSD FRML MDRD: 41 ML/MIN/1.73SQ M
GLUCOSE SERPL-MCNC: 100 MG/DL (ref 65–140)
GLUCOSE SERPL-MCNC: 124 MG/DL (ref 65–140)
GLUCOSE SERPL-MCNC: 127 MG/DL (ref 65–140)
GLUCOSE SERPL-MCNC: 153 MG/DL (ref 65–140)
GLUCOSE SERPL-MCNC: 158 MG/DL (ref 65–140)
GLUCOSE SERPL-MCNC: 169 MG/DL (ref 65–140)
GLUCOSE SERPL-MCNC: 169 MG/DL (ref 65–140)
HCT VFR BLD AUTO: 35 % (ref 34.8–46.1)
HGB BLD-MCNC: 11.5 G/DL (ref 11.5–15.4)
MCH RBC QN AUTO: 29.6 PG (ref 26.8–34.3)
MCHC RBC AUTO-ENTMCNC: 32.9 G/DL (ref 31.4–37.4)
MCV RBC AUTO: 90 FL (ref 82–98)
PLATELET # BLD AUTO: 283 THOUSANDS/UL (ref 149–390)
PMV BLD AUTO: 10 FL (ref 8.9–12.7)
POTASSIUM SERPL-SCNC: 4.1 MMOL/L (ref 3.5–5.3)
POTASSIUM SERPL-SCNC: 4.2 MMOL/L (ref 3.5–5.3)
POTASSIUM SERPL-SCNC: 4.6 MMOL/L (ref 3.5–5.3)
RBC # BLD AUTO: 3.88 MILLION/UL (ref 3.81–5.12)
SODIUM SERPL-SCNC: 126 MMOL/L (ref 135–147)
SODIUM SERPL-SCNC: 128 MMOL/L (ref 135–147)
SODIUM SERPL-SCNC: 128 MMOL/L (ref 135–147)
WBC # BLD AUTO: 8.57 THOUSAND/UL (ref 4.31–10.16)

## 2024-07-26 PROCEDURE — 82533 TOTAL CORTISOL: CPT | Performed by: INTERNAL MEDICINE

## 2024-07-26 PROCEDURE — 85027 COMPLETE CBC AUTOMATED: CPT

## 2024-07-26 PROCEDURE — 97167 OT EVAL HIGH COMPLEX 60 MIN: CPT

## 2024-07-26 PROCEDURE — 84443 ASSAY THYROID STIM HORMONE: CPT

## 2024-07-26 PROCEDURE — 80048 BASIC METABOLIC PNL TOTAL CA: CPT

## 2024-07-26 PROCEDURE — 82948 REAGENT STRIP/BLOOD GLUCOSE: CPT

## 2024-07-26 PROCEDURE — 99232 SBSQ HOSP IP/OBS MODERATE 35: CPT | Performed by: INTERNAL MEDICINE

## 2024-07-26 PROCEDURE — 97535 SELF CARE MNGMENT TRAINING: CPT

## 2024-07-26 RX ORDER — ROPINIROLE 1 MG/1
2 TABLET, FILM COATED ORAL
Status: DISCONTINUED | OUTPATIENT
Start: 2024-07-26 | End: 2024-07-29 | Stop reason: HOSPADM

## 2024-07-26 RX ORDER — GUAIFENESIN 100 MG/5ML
200 SOLUTION ORAL EVERY 4 HOURS PRN
Status: DISCONTINUED | OUTPATIENT
Start: 2024-07-26 | End: 2024-07-27

## 2024-07-26 RX ADMIN — INSULIN GLARGINE 10 UNITS: 100 INJECTION, SOLUTION SUBCUTANEOUS at 22:05

## 2024-07-26 RX ADMIN — ZOLPIDEM TARTRATE 10 MG: 5 TABLET, COATED ORAL at 22:05

## 2024-07-26 RX ADMIN — METOPROLOL SUCCINATE 75 MG: 50 TABLET, EXTENDED RELEASE ORAL at 08:11

## 2024-07-26 RX ADMIN — FLECAINIDE ACETATE 150 MG: 50 TABLET ORAL at 08:11

## 2024-07-26 RX ADMIN — GUAIFENESIN 200 MG: 200 SOLUTION ORAL at 20:13

## 2024-07-26 RX ADMIN — INSULIN LISPRO 1 UNITS: 100 INJECTION, SOLUTION INTRAVENOUS; SUBCUTANEOUS at 18:00

## 2024-07-26 RX ADMIN — METOPROLOL SUCCINATE 75 MG: 50 TABLET, EXTENDED RELEASE ORAL at 20:13

## 2024-07-26 RX ADMIN — LORATADINE 10 MG: 10 TABLET ORAL at 08:11

## 2024-07-26 RX ADMIN — APIXABAN 5 MG: 5 TABLET, FILM COATED ORAL at 08:11

## 2024-07-26 RX ADMIN — ROPINIROLE HYDROCHLORIDE 2 MG: 1 TABLET, FILM COATED ORAL at 22:05

## 2024-07-26 RX ADMIN — GUAIFENESIN 200 MG: 200 SOLUTION ORAL at 11:44

## 2024-07-26 RX ADMIN — APIXABAN 5 MG: 5 TABLET, FILM COATED ORAL at 18:00

## 2024-07-26 RX ADMIN — INSULIN LISPRO 1 UNITS: 100 INJECTION, SOLUTION INTRAVENOUS; SUBCUTANEOUS at 11:48

## 2024-07-26 RX ADMIN — GABAPENTIN 300 MG: 300 CAPSULE ORAL at 22:05

## 2024-07-26 RX ADMIN — FLECAINIDE ACETATE 150 MG: 50 TABLET ORAL at 18:00

## 2024-07-26 RX ADMIN — GUAIFENESIN 200 MG: 200 SOLUTION ORAL at 16:09

## 2024-07-26 RX ADMIN — POLYETHYLENE GLYCOL 3350 17 G: 17 POWDER, FOR SOLUTION ORAL at 08:10

## 2024-07-26 RX ADMIN — DILTIAZEM HYDROCHLORIDE 120 MG: 120 CAPSULE, COATED, EXTENDED RELEASE ORAL at 08:11

## 2024-07-26 RX ADMIN — EZETIMIBE 10 MG: 10 TABLET ORAL at 22:05

## 2024-07-26 RX ADMIN — OXYCODONE HYDROCHLORIDE AND ACETAMINOPHEN 500 MG: 500 TABLET ORAL at 08:11

## 2024-07-26 RX ADMIN — LIDOCAINE 1 PATCH: 700 PATCH TOPICAL at 08:10

## 2024-07-26 NOTE — PROGRESS NOTES
[facility]  Progress Note  Name: Farnaz Martin I  MRN: [id number]  Unit/Bed#: -01 I Date of Admission: 7/24/2024   Date of Service: 7/26/2024 I Hospital Day: 2    Assessment & Plan   * Acute kidney injury superimposed on CKD (chronic kidney disease) stage 3, GFR 30-59 ml/min ([facility])  Assessment & Plan    POA1.64; (baseline .9-1.1)   Increased weakness, fatigue since last hospital admission  Dry on exam during admission  Pt taking higher Lasix dose since last admission; increased from 20 mg daily to 40 mg  Suspect KIMBERLEY secondary to overdiuresis, poor oral intake    Plan:  Kidney function improving after gentle IV fluids. Continue to encourage po intake  Hold off on Lasix at this time. Trial tomorrow, per cardio  Decrease outpatient Lasix dose, per cardio  Follow kidney function w/ BMP   Avoid hypoperfusion of the kidneys, minimize nephrotoxins  Consider nephrology consultation if worsening KIMBERLEY      Fatigue  Assessment & Plan  Increased fatigue, weakness, poor oral intake since last hospitalization  Presyncope episode day of admission  Pt's Lasix dose increased from 20 to 40 mg daily since last admission   Suspect multifactorial, related to overdiuresis, poor oral intake, KIMBERLEY    Plan:   Hold Lasix at this time until tomorrow, per cardio  Fatigue improved following gentle fluids  PT/OT consult  Orthostatic vitals  Cardiac interrogation  Consider geriatric consult      Recurrent pleural effusion on right  Assessment & Plan  Hx right-sided pleural effusion in the setting of bronchiectasis and pulmonary hypertension  Underwent thoracentesis on 7/15/2024 with fluid suggested of transudate by effusion.    Chest x-ray on 7/22/2024 showed new small right-sided pleural effusion.    Chest x-ray on admission appears to show worsening right-sided pleural effusion   Patient reports taking Lasix 40 mg twice daily after discharge from the hospital.   Suspect recurrent pleural effusion secondary to  heart failure.    Plan:  Consider r/p thoracentesis if symptomatic and doesn't resolve on own  Hold Lasix in setting of KIMBERLEY likely 2/2 overdiuresis, per cardio    Hyponatremia  Assessment & Plan  Patient with history of chronic hyponatremia, presents on admission with sodium of 128.  Baseline Sodium between 128-134  Serum osm 292; U osm 511; Kylie 11  Lipids low HDL  Glucose 153    Plan:   Acute on chronic hyponatremia significantly reduced, may be 2/2 overdiuresis  Hold Lasix until tomorrow, per cardio  R/p BMP in morning  F/up morning cortisol      Acute on chronic heart failure (HCC)  Assessment & Plan  Wt Readings from Last 3 Encounters:   07/26/24 73.3 kg (161 lb 9.6 oz)   07/22/24 72.5 kg (159 lb 12.8 oz)   07/17/24 72.1 kg (159 lb)       BNP elevated at 1,428 on admission    Plan:  Strict I/Os  Daily weights  Rest of plan as KIMBERLEY        Paroxysmal A-fib ([facility])  Assessment & Plan  Afib-  S/p ablation in 03/2021.   EKG-Sinus rhythm with frequent Premature atrial complexes with 1st degree A-V block with frequent ventricular-paced complexes  Low voltage QRS    Plan:  Eliquis 5 mg twice daily  Continue flecainide 150 mg twice daily and metoprolol succinate 75 mg twice daily  Continue diltiazem 120 mg daily    Restless legs syndrome  Assessment & Plan  Continue ropinirole               VTE Pharmacologic Prophylaxis: VTE Score: 4 Moderate Risk (Score 3-4) - Pharmacological DVT Prophylaxis Ordered: apixaban (Eliquis).    Mobility:   Basic Mobility Inpatient Raw Score: 19  JH-HLM Goal: 6: Walk 10 steps or more  JH-HLM Achieved: 6: Walk 10 steps or more  JH-HLM Goal achieved. Continue to encourage appropriate mobility.    Patient Centered Rounds: I performed bedside rounds with nursing staff today.  Discussions with Specialists or Other Care Team Provider: Cardiology    Education and Discussions with Family / Patient: Updated  (daughter) at bedside.    Current Length of Stay: 2 day(s)  Current Patient Status:  Inpatient   Discharge Plan: Anticipate discharge in 24-48 hrs to home with home services.    Code Status: Level 1 - Full Code    Subjective:   Pt's fatigue and weakness has improved today. Patient endorsed mild lightheadedness when ambulating to bathroom this morning but denies shortness of breath on exertion. Denies chest pain, orthopnea, heart palpitations, fever, chills.    Objective:     Vitals:   Temp (24hrs), Av.2 °F (36.8 °C), Min:97.8 °F (36.6 °C), Max:98.6 °F (37 °C)    Temp:  [97.8 °F (36.6 °C)-98.6 °F (37 °C)] 97.8 °F (36.6 °C)  HR:  [71-83] 71  Resp:  [18-20] 18  BP: (106-135)/(54-81) 108/57  SpO2:  [93 %-96 %] 93 %  Body mass index is 29.56 kg/m².     Input and Output Summary (last 24 hours):     Intake/Output Summary (Last 24 hours) at 2024 1328  Last data filed at 2024 0501  Gross per 24 hour   Intake --   Output 1125 ml   Net -1125 ml       Physical Exam:   Physical Exam  Constitutional:       General: She is not in acute distress.  HENT:      Head: Normocephalic.      Mouth/Throat:      Mouth: Mucous membranes are moist.   Eyes:      Extraocular Movements: Extraocular movements intact.   Cardiovascular:      Rate and Rhythm: Normal rate and regular rhythm.      Heart sounds: No murmur heard.     No friction rub. No gallop.   Pulmonary:      Effort: Pulmonary effort is normal. No respiratory distress.      Breath sounds: Normal breath sounds. No wheezing, rhonchi or rales.   Abdominal:      General: Abdomen is flat. Bowel sounds are normal. There is no distension.      Palpations: Abdomen is soft.      Tenderness: There is no abdominal tenderness. There is no guarding.   Musculoskeletal:      Comments: No lower extremity edema   Neurological:      Mental Status: She is alert and oriented to person, place, and time.          Additional Data:     Labs:  Results from last 7 days   Lab Units 24  0520 24  0436   WBC Thousand/uL 8.57 8.30   HEMOGLOBIN g/dL 11.5 11.9   HEMATOCRIT %  35.0 37.2   PLATELETS Thousands/uL 283 281   SEGS PCT %  --  61   LYMPHO PCT %  --  22   MONO PCT %  --  14*   EOS PCT %  --  2     Results from last 7 days   Lab Units 07/26/24  1141 07/25/24  0436 07/24/24  1551   SODIUM mmol/L 128*   < > 126*   POTASSIUM mmol/L 4.2   < > 4.5   CHLORIDE mmol/L 96   < > 93*   CO2 mmol/L 24   < > 23   BUN mg/dL 48*   < > 50*   CREATININE mg/dL 1.39*   < > 1.64*   ANION GAP mmol/L 8   < > 10   CALCIUM mg/dL 8.9   < > 9.0   ALBUMIN g/dL  --   --  3.8   TOTAL BILIRUBIN mg/dL  --   --  0.90   ALK PHOS U/L  --   --  71   ALT U/L  --   --  52   AST U/L  --   --  46*   GLUCOSE RANDOM mg/dL 153*   < > 153*    < > = values in this interval not displayed.         Results from last 7 days   Lab Units 07/26/24  1110 07/26/24  0736 07/25/24  2109 07/25/24  1819 07/25/24  1601 07/25/24  1117 07/25/24  0737 07/24/24 2057   POC GLUCOSE mg/dl 169* 124 182* 112 145* 189* 112 140               Lines/Drains:  Invasive Devices       Peripheral Intravenous Line  Duration             Peripheral IV 07/24/24 Right Antecubital 1 day                          Imaging: Reviewed radiology reports from this admission including: chest xray  XR chest 1 view portable   ED Interpretation by Odalis Bronson MD (07/24 1835)   Worsening right plural effusion, mild pulm vasc congestion      Final Result by Bertha Richter MD (07/24 2035)      Mild pulmonary venous congestion with slight increase in moderate right effusion.      Mild subsegmental atelectasis in the right midlung.            Workstation performed: [id number]              Recent Cultures (last 7 days):         Last 24 Hours Medication List:   Current Facility-Administered Medications   Medication Dose Route Frequency Provider Last Rate    acetaminophen  650 mg Oral Q6H PRN Pauly Balderas DO      apixaban  5 mg Oral BID Yenny Spencer MD      ascorbic acid  500 mg Oral Daily Yenny Spencer MD      diltiazem  120 mg Oral Daily Yenny  MD Johanna      ezetimibe  10 mg Oral HS Yenny Spencer MD      flecainide  150 mg Oral BID JENNY Sapp      gabapentin  300 mg Oral HS Yenny Spencer MD      guaiFENesin  200 mg Oral Q4H PRN Holly Louise MD      insulin glargine  10 Units Subcutaneous HS Yenny Spencer MD      insulin lispro  1-6 Units Subcutaneous 4x Daily (PC & HS) Yenny Spencer MD      lidocaine  1 patch Topical Daily Yenny Spencer MD      loratadine  10 mg Oral Daily Yenny Spencer MD      metoprolol succinate  75 mg Oral Q12H Yenny Spencer MD      polyethylene glycol  17 g Oral Daily Yenny Spencer MD      rOPINIRole  2 mg Oral HS Sean Foster MD      senna-docusate sodium  1 tablet Oral HS Yenny Spencer MD      zolpidem  10 mg Oral HS PRN Yenny Spencer MD          Today, Patient Was Seen By: Holly Louise MD    **Please Note: This note may have been constructed using a voice recognition system.**

## 2024-07-26 NOTE — ASSESSMENT & PLAN NOTE
Hx right-sided pleural effusion in the setting of bronchiectasis and pulmonary hypertension  Underwent thoracentesis on 7/15/2024 with fluid suggested of transudate by effusion.    Chest x-ray on 7/22/2024 showed new small right-sided pleural effusion.    Chest x-ray on admission appears to show worsening right-sided pleural effusion   Patient reports taking Lasix 40 mg twice daily after discharge from the hospital.   Suspect recurrent pleural effusion secondary to heart failure.    Plan:  Consider r/p thoracentesis if symptomatic and doesn't resolve on own  Hold Lasix in setting of KIMBERLEY likely 2/2 overdiuresis, per cardio

## 2024-07-26 NOTE — QUICK NOTE
"78 y.o. female with a PMH of HFpEF (EF 50%), recurrent right-sided pleural effusion, restless leg syndrome, A-fib on Eliquis, and chronic hyponatremia who presents with generalized fatigue, near syncope (w/out fall).     Following up lab results:  Recent Labs     07/25/24  0436 07/25/24  1548 07/25/24  2120   CREATININE 1.91* 1.95* 1.57*   EGFR 24 23 31     Estimated Creatinine Clearance: 27.2 mL/min (A) (by C-G formula based on SCr of 1.57 mg/dL (H)).  Recent Labs     07/25/24  1222 07/25/24  1548 07/25/24  2120   SODIUM 127* 128* 127*     Patient was started on gentle IVF 75cc/hr for about 5 hours from 10am to about 3pm. Went to evaluate patient. Reports no SOB, leg swelling. She does report bark cough.     /54   Pulse 74   Temp 98.6 °F (37 °C) (Oral)   Resp 20   Ht 5' 2\" (1.575 m)   Wt 73.2 kg (161 lb 6.4 oz)   LMP 02/01/1990 (Within Weeks)   SpO2 93%   BMI 29.52 kg/m²     Physical Exam:   Gen.: no acute distress, laying comfortably in bed, laying on her right side  Mouth: Mucous membranes dry  Heart: Regular rate and rhythm, systolic murmur  Lungs: Right lung diminished breathe sounds. Left lung clear to auscultation bilaterally, no wheezing, rales, or rhonchi, no accessory muscle use. Harsh barky cough.   Extremities: Warm and well perfused ×4, cap refill less than 2 seconds. Restless legs moving. No edema  Neuro: Awake, alert, and active         Previous serum osm: 293  Urine Os 511  Urine Na: 11      Plan:  Clinically patient appears hypovolemic, however due to right pleural effusion, will hold on IVF until repeat BMP   Continue to monitor  F/u BMP q6h-next due 2Am  Consider restarting IVF at 75cc/hr for 5 hours if Cr worsens or hyponatremia worsens  Consider restarting Ropinirole  Contraindications: pleural fibrosis, given her history recurrent pleural effusions, will hold on restarting. Per patient she takes Ropinirole   Consider starting Magnesium-patient noted looser bowel movements early " today but only 1 BM.   For Muscle cramps, Magnesium 120 mg in am, 240 mg in pm   For insominia Magnesium 500mg qhs  S/p Ambien 10mg for insominia due to RLS  Monitor Cough-vital signs stable, consider infectious work up if cough worsens

## 2024-07-26 NOTE — ASSESSMENT & PLAN NOTE
Patient with history of chronic hyponatremia, presents on admission with sodium of 128.  Baseline Sodium between 128-134  Serum osm 292; U osm 511; Kylie 11  Lipids low HDL  Glucose 153    Plan:   Acute on chronic hyponatremia significantly reduced, may be 2/2 overdiuresis  Hold Lasix until tomorrow, per cardio  R/p BMP in morning  F/up morning cortisol

## 2024-07-26 NOTE — CASE MANAGEMENT
Case Management Discharge Planning Note    Patient name Farnaz Martin  Location /-01 MRN [id number]  : 1945 Date 2024       Current Admission Date: 2024  Current Admission Diagnosis:Fatigue   Patient Active Problem List    Diagnosis Date Noted Date Diagnosed    Acute kidney injury superimposed on CKD (chronic kidney disease) stage 3, GFR 30-59 ml/min ([facility]) 2024     Fatigue 2024     Recurrent pleural effusion on right 2024     Syncope and collapse 2024     Fall 2024     Type 2 diabetes mellitus with microalbuminuria, without long-term current use of insulin ([facility]) 2024     Paroxysmal A-fib ([facility]) 2024     Ambulatory dysfunction 2024     Hyponatremia 2024     Left renal mass 2024     Chronic cough 2024     Bronchiectasis without complication ([facility]) 2024     Multiple thyroid nodules 2024     Abnormal CT of the chest 2023     S/P revision of total knee, left 2023    Nonrheumatic mitral valve regurgitation 2023     Acute on chronic heart failure ([facility]) 2022     Meningioma ([facility]) 2022     Chronic tension-type headache, not intractable 2022     Vasomotor rhinitis 2021     MGUS (monoclonal gammopathy of unknown significance) 2021     Hurthle cell adenoma of thyroid 2021     Tremors of nervous system 2021     Hx of diplopia 2021     S/P placement of cardiac pacemaker 2021     History of sick sinus syndrome s/p PPM 2021     Current use of long term anticoagulation 2021     Malignant neoplasm of thyroid gland ([facility]) 2021     Chronic heart failure with preserved ejection fraction ([facility]) 2021     Chronic rhinitis 11/10/2020     Arthritis of both acromioclavicular joints 2020     Carpal tunnel syndrome on right 2019     Osteoarthritis of shoulder      TMJ syndrome 10/18/2017     Essential hypertension  04/19/2016     Peripheral neuropathy 03/07/2016     Lumbar spondylosis 06/29/2015     Lumbar stenosis 06/29/2015     Restless legs syndrome 07/09/2014     Allergic rhinitis 04/01/2013     Osteoarthritis of knee 04/01/2013     Insomnia 01/04/2013     Osteopenia 11/26/2012     Fibromyalgia 10/16/2012     Hyperlipidemia 10/16/2012     Osteoarthrosis, hand 10/16/2012     Vitamin D deficiency 10/16/2012       LOS (days): 2  Geometric Mean LOS (GMLOS) (days): 3.1  Days to GMLOS:1.4     OBJECTIVE:  Risk of Unplanned Readmission Score: 32.74         Current admission status: Inpatient   Preferred Pharmacy:   [id number] - [place] - [address]  [address]  [place] [address]  Phone: [phone] Fax: [phone]    Primary Care Provider: Naveen Monsivais MD    Primary Insurance: MEDICARE  Secondary Insurance: AARP    DISCHARGE DETAILS:    Discharge planning discussed with:: Patient  Freedom of Choice: Yes  Comments - Freedom of Choice: CM reviewed PT rec level 3. Pt agreeable to [facility], aware agency is able to accept referral.     Were Treatment Team discharge recommendations reviewed with patient/caregiver?: Yes  Did patient/caregiver verbalize understanding of patient care needs?: Yes  Were patient/caregiver advised of the risks associated with not following Treatment Team discharge recommendations?: Yes    Contacts  Patient Contacts: Farnaz  Relationship to Patient:: Other (Comment) (Self)  Contact Method: In Person  Reason/Outcome: Continuity of Care, Emergency Contact, Referral, Discharge Planning    Requested Home Health Care         Is the patient interested in [facility] at discharge?: Yes  Home Health Discipline requested:: Nursing, Occupational Therapy, Physical Therapy  Home Health Agency Name:: [facility] External Referral Reason (only applicable if external HHA name selected): Patient has established relationship with provider  Home Health Follow-Up Provider:: PCP  Home Health Services  Needed:: Evaluate Functional Status and Safety, Gait/ADL Training, Strengthening/Theraputic Exercises to Improve Function  Homebound Criteria Met:: Requires the Assistance of Another Person for Safe Ambulation or to Leave the Home, Uses an Assist Device (i.e. cane, walker, etc)    DME Referral Provided  Referral made for DME?: No    Other Referral/Resources/Interventions Provided:  Interventions: Other (Specify), [facility]  Referral Comments: CM met with pt at bedside to f/u on dcp. CM reviewed PT rec level 3. Pt agreeable to [facility], aware they were able to accept the referral. CM updated AVS. Pt reported her daughter will provide transportation home of day of d/c. CM will remain available.    Treatment Team Recommendation: Home with Home Health Care  Discharge Destination Plan:: Home with Home Health Care  Transport at Discharge : Family

## 2024-07-26 NOTE — QUICK NOTE
Spoke with patient's daughter at bedside who clarified PTA Lasix dose was only 40 mg PO daily. This had been increased last admission from 20 mg PO daily. Tentative diuretic plan for d/c would be to trial her on torsemide 10 mg PO daily. She is also interested in pursuing cardiac rehab for the patient which would be beneficial however it sounds like Farnaz will need more physical therapy before that is a safe option for her.

## 2024-07-26 NOTE — ASSESSMENT & PLAN NOTE
POA1.64; (baseline .9-1.1)   Increased weakness, fatigue since last hospital admission  Dry on exam during admission  Pt taking higher Lasix dose since last admission; increased from 20 mg daily to 40 mg  Suspect KIMBERLEY secondary to overdiuresis, poor oral intake    Plan:  Kidney function improving after gentle IV fluids. Continue to encourage po intake  Hold off on Lasix at this time. Trial tomorrow, per cardio  Decrease outpatient Lasix dose, per cardio  Follow kidney function w/ BMP   Avoid hypoperfusion of the kidneys, minimize nephrotoxins  Consider nephrology consultation if worsening KIMBERLEY

## 2024-07-26 NOTE — ASSESSMENT & PLAN NOTE
Increased fatigue, weakness, poor oral intake since last hospitalization  Presyncope episode day of admission  Pt's Lasix dose increased from 20 to 40 mg daily since last admission   Suspect multifactorial, related to overdiuresis, poor oral intake, KIMBERLEY    Plan:   Hold Lasix at this time until tomorrow, per cardio  Fatigue improved following gentle fluids  PT/OT consult  Orthostatic vitals  Cardiac interrogation  Consider geriatric consult

## 2024-07-26 NOTE — TELEPHONE ENCOUNTER
Molly from [employer] called regarding form for genetic testing. I did not see one in her chart. I advised Molly that we would not be sending anything back until the patient confirms that she wants this. Molly thanked me for the information and hung up.

## 2024-07-26 NOTE — ASSESSMENT & PLAN NOTE
Wt Readings from Last 3 Encounters:   07/26/24 73.3 kg (161 lb 9.6 oz)   07/22/24 72.5 kg (159 lb 12.8 oz)   07/17/24 72.1 kg (159 lb)       BNP elevated at 1,428 on admission    Plan:  Strict I/Os  Daily weights  Rest of plan as KIMBERLEY

## 2024-07-26 NOTE — PROGRESS NOTES
General Cardiology   Progress Note -  Team One   Farnaz Martin 78 y.o. female MRN: [id number]  Unit/Bed#: -01 Encounter: [id number]    Assessment     Weakness/fatigue  Multifactorial from over diuresis resulting in KIMBERLEY, deconditioning, poor oral intake     KIMBERLEY- creatinine improving with IVF, 1.4 today, 1.9 at peak yesterday. Baseline 0.9-1.1     Acute on chronic hyponatremia  Na improved to 128 today with normal saline infusion     Recurrent right sided pleural effusion  Moderate per CXR read  Thoracentesis last admit consistent with transudative effusion     Chronic HFpEF  Home diuretic: Lasix 40 mg daily but taking BID per patient since last d/c  Weight: 161 lb per standing scale  Dry weight: recently 158-160 lb  No symptoms of acute CHF exacerbation     Paroxysmal atrial fibrillation/flutter  Hx of atrial fibrillation s/p ablation x 2, atrial flutter ablation as well  History of atrial tachycardia on device interrogations  PTA-Tambocor 150 mg twice daily, Toprol XL 75 mg twice daily (decreased on 7/22 from 100 mg BID due on going fatigue) and diltiazem 120 mg daily  History of QTc prolongation and torsades with sotalol  Anticoagulated on Eliquis 5 mg twice daily     7.  HTN- improved, 24 hour average /72  Home Rx: Lasix, diltiazem, Toprol     8.  HLD- on Zetia     9.  Bronchiectasis- chronic cough, unchanged per patient. Saw pulmonology last admission.      10.  Left renal mass- outpatient follow up     11.  Type 2 DM- A1c 8.8%     Plan/discussion  Creatinine and hyponatremia improved after IVF. Recommend continuing to hold diuretics today and repeat BMP in the morning. Encouraged PO intake.  If renal function continues to improve back toward baseline then would resume Lasix 40 mg PO daily tomorrow.  Continue flecainide 150 mg twice daily, Toprol-XL 75 mg twice daily, and diltiazem 120 mg daily with hold parameters.  Continue anticoagulation with Eliquis 5 mg p.o. twice daily  Can d/c  "telemetry    Subjective  Review of Systems   Constitutional: Negative for chills, malaise/fatigue and weight gain.   Cardiovascular:  Negative for chest pain, dyspnea on exertion, leg swelling, orthopnea, palpitations and syncope.   Respiratory:  Positive for cough. Negative for shortness of breath, sleep disturbances due to breathing and sputum production.    Gastrointestinal:  Negative for bloating, nausea and vomiting.   Neurological:  Negative for dizziness, light-headedness and weakness.   Psychiatric/Behavioral:  Negative for altered mental status.    All other systems reviewed and are negative.    Objective:   Vitals: Blood pressure 122/67, pulse 83, temperature 97.8 °F (36.6 °C), temperature source Oral, resp. rate 18, height 5' 2\" (1.575 m), weight 73.3 kg (161 lb 9.6 oz), last menstrual period 1990, SpO2 93%, not currently breastfeeding., Body mass index is 29.56 kg/m².,     Systolic (24hrs), Av , Min:106 , Max:145     Diastolic (24hrs), Av, Min:54, Max:94        Intake/Output Summary (Last 24 hours) at 2024 0829  Last data filed at 2024 0501  Gross per 24 hour   Intake --   Output 1125 ml   Net -1125 ml     Wt Readings from Last 3 Encounters:   24 73.3 kg (161 lb 9.6 oz)   24 72.5 kg (159 lb 12.8 oz)   24 72.1 kg (159 lb)     Telemetry Review: V paced    Physical Exam  Vitals reviewed.   Constitutional:       General: She is not in acute distress.     Appearance: She is obese.   Neck:      Vascular: No hepatojugular reflux or JVD.   Cardiovascular:      Rate and Rhythm: Normal rate and regular rhythm.      Pulses: Normal pulses.      Heart sounds: Normal heart sounds. No murmur heard.     No friction rub. No gallop.   Pulmonary:      Effort: Pulmonary effort is normal. No respiratory distress.      Breath sounds: No rales.      Comments: Very diminished right base. Otherwise clear. On room air  Abdominal:      General: Bowel sounds are normal. There is no " distension.      Palpations: Abdomen is soft.      Tenderness: There is no abdominal tenderness.   Musculoskeletal:         General: No tenderness. Normal range of motion.      Cervical back: Neck supple.      Right lower leg: No edema.      Left lower leg: No edema.   Skin:     General: Skin is warm and dry.      Findings: No erythema.   Neurological:      Mental Status: She is alert and oriented to person, place, and time.   Psychiatric:         Mood and Affect: Mood normal.         LABORATORY RESULTS      CBC with diff:   Results from last 7 days   Lab Units 07/26/24 0520 07/25/24 0436 07/24/24  1551   WBC Thousand/uL 8.57 8.30 7.48   HEMOGLOBIN g/dL 11.5 11.9 11.8   HEMATOCRIT % 35.0 37.2 36.6   MCV fL 90 92 92   PLATELETS Thousands/uL 283 281 282   RBC Million/uL 3.88 4.05 4.00   MCH pg 29.6 29.4 29.5   MCHC g/dL 32.9 32.0 32.2   RDW % 13.9 13.9 14.0   MPV fL 10.0 10.1 9.9   NRBC AUTO /100 WBCs  --  0 0       CMP:  Results from last 7 days   Lab Units 07/26/24 0520 07/25/24 2120 07/25/24  1548 07/25/24  0436 07/24/24  1551 07/24/24  1045   POTASSIUM mmol/L 4.1 4.3 4.5 4.7 4.5 4.8   CHLORIDE mmol/L 96 96 93* 95* 93* 93*   CO2 mmol/L 23 22 26 20* 23 24   BUN mg/dL 51* 53* 56* 54* 50* 49*   CREATININE mg/dL 1.44* 1.57* 1.95* 1.91* 1.64* 1.59*   CALCIUM mg/dL 8.8 8.8 8.8 8.7 9.0 9.1   AST U/L  --   --   --   --  46*  --    ALT U/L  --   --   --   --  52  --    ALK PHOS U/L  --   --   --   --  71  --    EGFR ml/min/1.73sq m 34 31 23 24 29 30       BMP:  Results from last 7 days   Lab Units 07/26/24 0520 07/25/24 2120 07/25/24  1548 07/25/24  0436 07/24/24  1551 07/24/24  1045   POTASSIUM mmol/L 4.1 4.3 4.5 4.7 4.5 4.8   CHLORIDE mmol/L 96 96 93* 95* 93* 93*   CO2 mmol/L 23 22 26 20* 23 24   BUN mg/dL 51* 53* 56* 54* 50* 49*   CREATININE mg/dL 1.44* 1.57* 1.95* 1.91* 1.64* 1.59*   CALCIUM mg/dL 8.8 8.8 8.8 8.7 9.0 9.1       Lab Results   Component Value Date    CREATININE 1.44 (H) 07/26/2024    CREATININE 1.57  (H) 2024    CREATININE 1.95 (H) 2024       Lab Results   Component Value Date    NTBNP 761 (H) 2021    NTBNP 2,773 (H) 2021    NTBNP 506 (H) 2019           Results from last 7 days   Lab Units 24  0436 24  1045   MAGNESIUM mg/dL 2.4 2.3                           Lipid Profile:   Lab Results   Component Value Date    CHOL 205 2015    CHOL 195 07/10/2014     Lab Results   Component Value Date    HDL 28 (L) 2024    HDL 46 (L) 2024    HDL 53 2023     Lab Results   Component Value Date    LDLCALC 40 2024    LDLCALC 64 2024    LDLCALC 96 2023     Lab Results   Component Value Date    TRIG 55 2024    TRIG 63 2024    TRIG 68 2023       Cardiac testing:   Results for orders placed during the hospital encounter of 21    Echo complete with contrast if indicated    [address] [id number] [phone]    Transthoracic Echocardiogram  2D, M-mode, Doppler, and Color Doppler    Study date:  2021    Patient: TANISHA LEVINE  MR number: [id number]  Account number: [id number]  : 1945  Age: 75 years  Gender: Female  Status: Inpatient  Location: Bedside  Height: 62 in  Weight: 151.6 lb  BP: 126/ 94 mmHg    Indications: Atrial fibrillation    Diagnoses: I48.0 - Atrial fibrillation    Sonographer:  IGNACIO Cameron  Primary Physician:  Naveen Monsivais MD  Referring Physician:  Shantal Huff MD  Group:  [facility]  Interpreting Physician:  Kwabena Seymour MD    SUMMARY    LEFT VENTRICLE:  Systolic function was normal. Ejection fraction was estimated to be 55 %.  There were no regional wall motion abnormalities.  Wall thickness was at the upper limits of normal.  Doppler parameters were consistent with elevated ventricular end-diastolic filling pressure.    LEFT ATRIUM:  The atrium was mildly to moderately dilated.    RIGHT  ATRIUM:  The atrium was mildly dilated.    MITRAL VALVE:  There was mild stenosis.    TRICUSPID VALVE:  There was mild to moderate regurgitation.  Pulmonary artery systolic pressure was mildly increased.    HISTORY: PRIOR HISTORY: HLD, HTN, PAF, chronic CHF, thyroid nodule    PROCEDURE: The procedure was performed at the bedside. This was a routine study. The transthoracic approach was used. The study included complete 2D imaging, M-mode, complete spectral Doppler, and color Doppler. The heart rate was 101 bpm,  at the start of the study. Images were obtained from the parasternal, apical, subcostal, and suprasternal notch acoustic windows. Echocardiographic views were limited due to lung interference. This was a technically difficult study.    LEFT VENTRICLE: Size was normal. Systolic function was normal. Ejection fraction was estimated to be 55 %. There were no regional wall motion abnormalities. Wall thickness was at the upper limits of normal. DOPPLER: Transmitral flow  pattern: atrial fibrillation. Left ventricular diastolic function parameters were abnormal. Doppler parameters were consistent with elevated ventricular end-diastolic filling pressure.    RIGHT VENTRICLE: The size was normal. Systolic function was normal. Wall thickness was normal.    LEFT ATRIUM: The atrium was mildly to moderately dilated.    RIGHT ATRIUM: The atrium was mildly dilated.    MITRAL VALVE: Valve structure was normal. There was normal leaflet separation. DOPPLER: The transmitral velocity was within the normal range. There was mild stenosis. There was no significant regurgitation.    AORTIC VALVE: The valve was trileaflet. Leaflets exhibited normal thickness and normal cuspal separation. DOPPLER: Transaortic velocity was within the normal range. There was no evidence for stenosis. There was no significant  regurgitation.    TRICUSPID VALVE: The valve structure was normal. There was normal leaflet separation. DOPPLER: The  transtricuspid velocity was within the normal range. There was no evidence for stenosis. There was mild to moderate regurgitation. Pulmonary  artery systolic pressure was mildly increased.    PULMONIC VALVE: Leaflets exhibited normal thickness, no calcification, and normal cuspal separation. DOPPLER: The transpulmonic velocity was within the normal range. There was no significant regurgitation.    PERICARDIUM: There was no pericardial effusion. The pericardium was normal in appearance.    AORTA: The root exhibited normal size.    SYSTEMIC VEINS: IVC: The inferior vena cava was normal in size.    PULMONARY VEINS: DOPPLER: Doppler signals were not recordable in the pulmonary vein(s).    SYSTEM MEASUREMENT TABLES    2D  %FS: 37.15 %  Ao Diam: 2.74 cm  Ao asc: 2.72 cm  EDV(Teich): 103.17 ml  EF(Teich): 67.06 %  ESV(Teich): 33.98 ml  IVSd: 0.94 cm  LA Area: 13.2 cm2  LA Diam: 3.98 cm  LVEDV MOD A4C: 32.01 ml  LVEF MOD A4C: 63.74 %  LVESV MOD A4C: 11.61 ml  LVIDd: 4.72 cm  LVIDs: 2.96 cm  LVLd A4C: 5.9 cm  LVLs A4C: 4.79 cm  LVPWd: 0.93 cm  RA Area: 8.67 cm2  RVIDd: 3.35 cm  SV MOD A4C: 20.4 ml  SV(Teich): 69.19 ml    CW  TR MaxP.39 mmHg  TR Vmax: 2.89 m/s    MM  TAPSE: 1.51 cm    [facility] Accredited [facility]    Prepared and electronically signed by    Kwabena Seymour MD  Signed 2021 11:05:34    No results found for this or any previous visit.    No results found for this or any previous visit.    No valid procedures specified.  Results for orders placed during the hospital encounter of 10/05/22    NM myocardial perfusion spect (stress and/or rest)    Interpretation Summary    Resting ECG: ECG is abnormal. Resting ECG shows no ST-segment deviation. Patient a paced and V sensed.    Perfusion Defect Conclusion: The stress/rest perfusion ratio is 1.04 . There is no evidence of transient ischemic dilation (TID).    Perfusion: There is a left ventricular perfusion defect that is  small in size with mild reduction in uptake present in the mid to apical anterior and anteroseptal location(s) that is predominantly fixed. The defect appears to be an artifact caused by breast attenuation.    Stress Function: Left ventricular function post-stress is normal. Post-stress ejection fraction is 70 %.    Negative exercise pharmacological stress test      Meds/Allergies   all current active meds have been reviewed and current meds:   Current Facility-Administered Medications   Medication Dose Route Frequency    acetaminophen (TYLENOL) tablet 650 mg  650 mg Oral Q6H PRN    apixaban (ELIQUIS) tablet 5 mg  5 mg Oral BID    ascorbic acid (VITAMIN C) tablet 500 mg  500 mg Oral Daily    diltiazem (CARDIZEM CD) 24 hr capsule 120 mg  120 mg Oral Daily    ezetimibe (ZETIA) tablet 10 mg  10 mg Oral HS    flecainide (TAMBOCOR) tablet 150 mg  150 mg Oral BID    gabapentin (NEURONTIN) capsule 300 mg  300 mg Oral HS    insulin glargine (LANTUS) subcutaneous injection 10 Units 0.1 mL  10 Units Subcutaneous HS    insulin lispro (HumALOG/ADMELOG) 100 units/mL subcutaneous injection 1-6 Units  1-6 Units Subcutaneous 4x Daily (PC & HS)    lidocaine (LIDODERM) 5 % patch 1 patch  1 patch Topical Daily    loratadine (CLARITIN) tablet 10 mg  10 mg Oral Daily    metoprolol succinate (TOPROL-XL) 24 hr tablet 75 mg  75 mg Oral Q12H    polyethylene glycol (MIRALAX) packet 17 g  17 g Oral Daily    senna-docusate sodium (SENOKOT S) 8.6-50 mg per tablet 1 tablet  1 tablet Oral HS    zolpidem (AMBIEN) tablet 10 mg  10 mg Oral HS PRN       Medications Prior to Admission:     albuterol (ProAir HFA) 90 mcg/act inhaler    apixaban (ELIQUIS) 5 mg    ascorbic Acid (VITAMIN C) 500 MG CPCR    Cetirizine HCl (ZyrTEC Allergy) 10 MG CAPS    Cholecalciferol 50 MCG (2000 UT) CAPS    Chromium Picolinate (CHROMIUM PICOLATE PO)    dapagliflozin 5 MG TABS    diltiazem (CARDIZEM CD) 120 mg 24 hr capsule    ezetimibe (ZETIA) 10 mg tablet    famotidine  (PEPCID) 20 mg tablet    flecainide (TAMBOCOR) 100 mg tablet    furosemide (LASIX) 20 mg tablet    gabapentin (NEURONTIN) 300 mg capsule    glucose blood (OneTouch Verio) test strip    Insulin Glargine Solostar (Lantus SoloStar) 100 UNIT/ML SOPN    Insulin Pen Needle 31G X 4 MM MISC    metoprolol succinate (TOPROL-XL) 25 mg 24 hr tablet    OneTouch Delica Lancets 33G MISC    rOPINIRole (REQUIP) 1 mg tablet    sodium chloride 3 % inhalation solution    tiotropium (Spiriva Respimat) 2.5 MCG/ACT AERS inhaler    TURMERIC PO    zolpidem (AMBIEN) 10 mg tablet    benzonatate (TESSALON PERLES) 100 mg capsule    ipratropium (ATROVENT) 0.03 % nasal spray    predniSONE 10 mg tablet     Counseling / Coordination of Care  Total floor / unit time spent today 20 minutes.  Greater than 50% of total time was spent with the patient and / or family counseling and / or coordination of care.      ** Please Note: Dragon 360 Dictation voice to text software may have been used in the creation of this document. **

## 2024-07-26 NOTE — OCCUPATIONAL THERAPY NOTE
"  Occupational Therapy Evaluation/Treatment       07/26/24 0810   OT Last Visit   OT Visit Date 07/26/24   Note Type   Note type Evaluation   Pain Assessment   Pain Assessment Tool 0-10   Pain Score No Pain   Restrictions/Precautions   Weight Bearing Precautions Per Order No   Other Precautions Fall Risk   Home Living   Type of Home House   Home Layout One level;Performs ADLs on one level;Able to live on main level with bedroom/bathroom;Ramped entrance   Bathroom Shower/Tub Walk-in shower   Bathroom Toilet Standard   Bathroom Equipment Grab bars in shower;Shower chair;Commode   Home Equipment Walker;Cane   Additional Comments pt reports typically ambulating without AD at baseline however within last two weeks started using RW   Prior Function   Level of [place] Independent with ADLs;Independent with functional mobility;Independent with IADLS   Lives With Alone   Receives Help From Family  (daughter and son-in-law live next door)   IADLs Independent with driving;Independent with meal prep;Family/Friend/Other provides medication management   Falls in the last 6 months 1 to 4  (1)   Vocational Retired   General   Additional Pertinent History Pt admitted due to generalized fatigue and near-syncopal episode   Family/Caregiver Present No   Subjective   Subjective \"I'm antsy just sitting here. I didn't sleep well last night so that doesn't help.\"   ADL   Eating Assistance 7  Independent   Grooming Assistance 5  Supervision/Setup   UB Bathing Assistance 6  Modified Independent   LB Bathing Assistance 5  Supervision/Setup   UB Dressing Assistance 6  Modified independent   LB Dressing Assistance 5  Supervision/Setup   Toileting Assistance  5  Supervision/Setup   Bed Mobility   Supine to Sit 6  Modified independent   Additional items HOB elevated;Increased time required   Sit to Supine 6  Modified independent   Additional items HOB elevated;Increased time required   Transfers   Sit to Stand 5  Supervision   Additional " items Assist x 1;Increased time required;Impulsive   Stand to Sit 5  Supervision   Additional items Assist x 1;Verbal cues;Increased time required   Toilet transfer 5  Supervision   Additional items Assist x 1;Verbal cues;Increased time required;Standard toilet   Functional Mobility   Functional Mobility 5  Supervision   Additional Comments engaged in fxl mobility household distances using RW and supervision   Additional items Rolling walker   Balance   Static Sitting Good   Dynamic Sitting Fair +   Static Standing Fair   Dynamic Standing Fair   Ambulatory Fair -  (with RW)   Activity Tolerance   Activity Tolerance Patient tolerated treatment well;Patient limited by fatigue   RUE Assessment   RUE Assessment WFL   LUE Assessment   LUE Assessment WFL   Vision-Basic Assessment   Current Vision Wears glasses all the time   Patient Visual Report   (pt reports no visual changes or complaints)   Cognition   Overall Cognitive Status WFL   Arousal/Participation Alert;Cooperative   Attention Within functional limits   Orientation Level Oriented X4   Memory Within functional limits   Following Commands Follows one step commands without difficulty   Assessment   Limitation Decreased ADL status;Decreased endurance;Decreased self-care trans;Decreased high-level ADLs  (decreased balance and mobility)   Prognosis Good   Assessment Patient evaluated by Occupational Therapy.  Patient admitted with Fatigue.  The patients occupational profile, medical and therapy history includes a extensive additional review of physical, cognitive, or psychosocial history related to current functional performance.  Comorbidities affecting functional mobility and ADLS include: CHF, pleural effusion, restless leg syndrome, a-fib, hyponatremia.  Prior to admission, patient was independent with functional mobility with RW as needed, independent with ADLS, and independent with IADLS.  The evaluation identifies the following performance deficits: impaired  balance, decreased endurance, decreased ADLS, decreased IADLS, decreased activity tolerance, and decreased strength, that result in activity limitations and/or participation restrictions. This evaluation requires clinical decision making of high complexity, because the patient presents with comorbidites that affect occupational performance and required significant modification of tasks or assistance with consideration of multiple treatment options. The patient's raw score on the -PAC Daily Activity Inpatient Short Form is 21. A raw score of greater than or equal to 19 suggests the patient may benefit from discharge to home. Please refer to the recommendation of the Occupational Therapist for safe discharge planning.  Patient will benefit from skilled Occupational Therapy services to address above deficits and facilitate a safe return to prior level of function.   Goals   Patient Goals to go home   LTG Time Frame 10-14   Long Term Goal see goals below   Plan   Treatment Interventions ADL retraining;Functional transfer training;Endurance training;Patient/family training;Equipment evaluation/education;Activityengagement;Compensatory technique education   Goal Expiration Date 08/05/24   OT Frequency 2-3x/wk   Discharge Recommendation   Rehab Resource Intensity Level, OT III (Minimum Resource Intensity)   AM-PAC Daily Activity Inpatient   Lower Body Dressing 3   Bathing 3   Toileting 3   Upper Body Dressing 4   Grooming 4   Eating 4   Daily Activity Raw Score 21   Daily Activity Standardized Score (Calc for Raw Score >=11) 44.27   AM-PAC Applied Cognition Inpatient   Following a Speech/Presentation 4   Understanding Ordinary Conversation 4   Taking Medications 4   Remembering Where Things Are Placed or Put Away 4   Remembering List of 4-5 Errands 4   Taking Care of Complicated Tasks 4   Applied Cognition Raw Score 24   Applied Cognition Standardized Score 62.21   Additional Treatment Session   Start Time 0800   End  Time 0810   Treatment Assessment S: denies pain. O: Additional fxl mobility completed using RW and supervision. Standard toilet transfer with supervision. Voided bladder with supervision for clothing mgmt and hygiene. Washing hands at sink supervision. Sit<>stand supervision with RW. A: Pt tolerated OT treatment session well. Pt demo good safety awareness throughout. Pt with mild weakness/fatigue impacting fxl performance but overall feels much improved from admission. P: Pt would benefit from cont OT services to maximize safety and fxl performance of ADLs. Recommend level III minimum resource intensity when medically cleared for d/c.     GOALS:    Pt will achieve the following goals within 10 days.     *Pt will complete LB bathing and dressing with mod I with DME prn.     *Pt will complete toileting w/ mod I w/ G hygiene/thoroughness using DME prn.     *Pt will perform functional transfers on/off all surfaces with mod I using DME as needed w/ G balance/safety.     *Pt will increase standing tolerance to 5 minutes for increased tolerance with standing purposeful tasks.     *Pt will increase dynamic sitting balance to good to improve the ability to sit at edge of bed or on a chair for ADLS.       *Pt will increase static/dynamic standing balance to fair + to improve postural stability and decrease fall risk during standing ADLS and transfers.        *Patient will verbalize 3 safety awareness/ principles to prevent falls in the home setting.      *Pt will improve functional mobility during ADL/IADL/leisure tasks to mod I using DME as needed w/ G balance/safety.    *Pt will demonstrate activity tolerance of 25 min in therapeutic tasks for increased participation in meaningful activities upon D/C.      Chasity Triplett OTR/L [id number]

## 2024-07-26 NOTE — PLAN OF CARE
Problem: PAIN - ADULT  Goal: Verbalizes/displays adequate comfort level or baseline comfort level  Description: Interventions:  - Encourage patient to monitor pain and request assistance  - Assess pain using appropriate pain scale  - Administer analgesics based on type and severity of pain and evaluate response  - Implement non-pharmacological measures as appropriate and evaluate response  - Consider cultural and social influences on pain and pain management  - Notify physician/advanced practitioner if interventions unsuccessful or patient reports new pain  Outcome: Progressing     Problem: INFECTION - ADULT  Goal: Absence or prevention of progression during hospitalization  Description: INTERVENTIONS:  - Assess and monitor for signs and symptoms of infection  - Monitor lab/diagnostic results  - Monitor all insertion sites, i.e. indwelling lines, tubes, and drains  - Monitor endotracheal if appropriate and nasal secretions for changes in amount and color  - [place] appropriate cooling/warming therapies per order  - Administer medications as ordered  - Instruct and encourage patient and family to use good hand hygiene technique  - Identify and instruct in appropriate isolation precautions for identified infection/condition  Outcome: Progressing  Goal: Absence of fever/infection during neutropenic period  Description: INTERVENTIONS:  - Monitor WBC    Outcome: Progressing     Problem: SAFETY ADULT  Goal: Patient will remain free of falls  Description: INTERVENTIONS:  - Educate patient/family on patient safety including physical limitations  - Instruct patient to call for assistance with activity   - Consult OT/PT to assist with strengthening/mobility   - Keep Call bell within reach  - Keep bed low and locked with side rails adjusted as appropriate  - Keep care items and personal belongings within reach  - Initiate and maintain comfort rounds  - Make Fall Risk Sign visible to staff  - Apply yellow socks and bracelet  for high fall risk patients  - Consider moving patient to room near nurses station  Outcome: Progressing  Goal: Maintain or return to baseline ADL function  Description: INTERVENTIONS:  -  Assess patient's ability to carry out ADLs; assess patient's baseline for ADL function and identify physical deficits which impact ability to perform ADLs (bathing, care of mouth/teeth, toileting, grooming, dressing, etc.)  - Assess/evaluate cause of self-care deficits   - Assess range of motion  - Assess patient's mobility; develop plan if impaired  - Assess patient's need for assistive devices and provide as appropriate  - Encourage maximum independence but intervene and supervise when necessary  - Involve family in performance of ADLs  - Assess for home care needs following discharge   - Consider OT consult to assist with ADL evaluation and planning for discharge  - Provide patient education as appropriate  Outcome: Progressing  Goal: Maintains/Returns to pre admission functional level  Description: INTERVENTIONS:  - Perform AM-PAC 6 Click Basic Mobility/ Daily Activity assessment daily.  - Set and communicate daily mobility goal to care team and patient/family/caregiver.   - Collaborate with rehabilitation services on mobility goals if consulted  - Out of bed for toileting  - Record patient progress and toleration of activity level   Outcome: Progressing     Problem: DISCHARGE PLANNING  Goal: Discharge to home or other facility with appropriate resources  Description: INTERVENTIONS:  - Identify barriers to discharge w/patient and caregiver  - Arrange for needed discharge resources and transportation as appropriate  - Identify discharge learning needs (meds, wound care, etc.)  - Arrange for interpretive services to assist at discharge as needed  - Refer to Case Management Department for coordinating discharge planning if the patient needs post-hospital services based on physician/advanced practitioner order or complex needs  related to functional status, cognitive ability, or social support system  Outcome: Progressing     Problem: Knowledge Deficit  Goal: Patient/family/caregiver demonstrates understanding of disease process, treatment plan, medications, and discharge instructions  Description: Complete learning assessment and assess knowledge base.  Interventions:  - Provide teaching at level of understanding  - Provide teaching via preferred learning methods  Outcome: Progressing

## 2024-07-27 ENCOUNTER — APPOINTMENT (INPATIENT)
Dept: CT IMAGING | Facility: HOSPITAL | Age: 79
DRG: 682 | End: 2024-07-27
Payer: MEDICARE

## 2024-07-27 ENCOUNTER — APPOINTMENT (INPATIENT)
Dept: RADIOLOGY | Facility: HOSPITAL | Age: 79
DRG: 682 | End: 2024-07-27
Payer: MEDICARE

## 2024-07-27 PROBLEM — E04.1 THYROID NODULE: Status: ACTIVE | Noted: 2024-07-27

## 2024-07-27 PROBLEM — R29.90 STROKE-LIKE SYMPTOMS: Status: ACTIVE | Noted: 2024-07-27

## 2024-07-27 PROBLEM — R05.9 COUGH: Status: ACTIVE | Noted: 2024-01-25

## 2024-07-27 LAB
2HR DELTA HS TROPONIN: 1 NG/L
4HR DELTA HS TROPONIN: 1 NG/L
ALBUMIN SERPL ELPH-MCNC: 3.39 G/DL (ref 3.2–5.1)
ALBUMIN SERPL ELPH-MCNC: 57.4 % (ref 48–70)
ALPHA1 GLOB SERPL ELPH-MCNC: 0.33 G/DL (ref 0.15–0.47)
ALPHA1 GLOB SERPL ELPH-MCNC: 5.6 % (ref 1.8–7)
ALPHA2 GLOB SERPL ELPH-MCNC: 0.6 G/DL (ref 0.42–1.04)
ALPHA2 GLOB SERPL ELPH-MCNC: 10.1 % (ref 5.9–14.9)
ANION GAP SERPL CALCULATED.3IONS-SCNC: 7 MMOL/L (ref 4–13)
ATRIAL RATE: 76 BPM
BASE EX.OXY STD BLDV CALC-SCNC: 95.1 % (ref 60–80)
BASE EXCESS BLDV CALC-SCNC: -1.3 MMOL/L
BASOPHILS # BLD AUTO: 0.05 THOUSANDS/ÂΜL (ref 0–0.1)
BASOPHILS NFR BLD AUTO: 1 % (ref 0–1)
BETA GLOB ABNORMAL SERPL ELPH-MCNC: 0.47 G/DL (ref 0.31–0.57)
BETA1 GLOB SERPL ELPH-MCNC: 7.9 % (ref 4.7–7.7)
BETA2 GLOB SERPL ELPH-MCNC: 6.5 % (ref 3.1–7.9)
BETA2+GAMMA GLOB SERPL ELPH-MCNC: 0.38 G/DL (ref 0.2–0.58)
BUN SERPL-MCNC: 39 MG/DL (ref 5–25)
BUN SERPL-MCNC: 42 MG/DL (ref 5–25)
BUN SERPL-MCNC: 44 MG/DL (ref 5–25)
CALCIUM SERPL-MCNC: 8.8 MG/DL (ref 8.4–10.2)
CALCIUM SERPL-MCNC: 8.8 MG/DL (ref 8.4–10.2)
CALCIUM SERPL-MCNC: 9 MG/DL (ref 8.4–10.2)
CARDIAC TROPONIN I PNL SERPL HS: 10 NG/L
CARDIAC TROPONIN I PNL SERPL HS: 10 NG/L
CARDIAC TROPONIN I PNL SERPL HS: 9 NG/L
CHLORIDE SERPL-SCNC: 96 MMOL/L (ref 96–108)
CHLORIDE SERPL-SCNC: 96 MMOL/L (ref 96–108)
CHLORIDE SERPL-SCNC: 97 MMOL/L (ref 96–108)
CO2 SERPL-SCNC: 22 MMOL/L (ref 21–32)
CO2 SERPL-SCNC: 22 MMOL/L (ref 21–32)
CO2 SERPL-SCNC: 25 MMOL/L (ref 21–32)
CREAT SERPL-MCNC: 1.11 MG/DL (ref 0.6–1.3)
CREAT SERPL-MCNC: 1.11 MG/DL (ref 0.6–1.3)
CREAT SERPL-MCNC: 1.17 MG/DL (ref 0.6–1.3)
EOSINOPHIL # BLD AUTO: 0.26 THOUSAND/ÂΜL (ref 0–0.61)
EOSINOPHIL NFR BLD AUTO: 3 % (ref 0–6)
ERYTHROCYTE [DISTWIDTH] IN BLOOD BY AUTOMATED COUNT: 13.9 % (ref 11.6–15.1)
ERYTHROCYTE [DISTWIDTH] IN BLOOD BY AUTOMATED COUNT: 14 % (ref 11.6–15.1)
GAMMA GLOB ABNORMAL SERPL ELPH-MCNC: 0.74 G/DL (ref 0.4–1.66)
GAMMA GLOB SERPL ELPH-MCNC: 12.5 % (ref 6.9–22.3)
GFR SERPL CREATININE-BSD FRML MDRD: 44 ML/MIN/1.73SQ M
GFR SERPL CREATININE-BSD FRML MDRD: 47 ML/MIN/1.73SQ M
GFR SERPL CREATININE-BSD FRML MDRD: 47 ML/MIN/1.73SQ M
GLUCOSE SERPL-MCNC: 113 MG/DL (ref 65–140)
GLUCOSE SERPL-MCNC: 117 MG/DL (ref 65–140)
GLUCOSE SERPL-MCNC: 133 MG/DL (ref 65–140)
GLUCOSE SERPL-MCNC: 134 MG/DL (ref 65–140)
GLUCOSE SERPL-MCNC: 138 MG/DL (ref 65–140)
GLUCOSE SERPL-MCNC: 158 MG/DL (ref 65–140)
GLUCOSE SERPL-MCNC: 185 MG/DL (ref 65–140)
GLUCOSE SERPL-MCNC: 189 MG/DL (ref 65–140)
GLUCOSE SERPL-MCNC: 82 MG/DL (ref 65–140)
GLUCOSE SERPL-MCNC: 97 MG/DL (ref 65–140)
HCO3 BLDV-SCNC: 20.7 MMOL/L (ref 24–30)
HCT VFR BLD AUTO: 34.2 % (ref 34.8–46.1)
HCT VFR BLD AUTO: 35.2 % (ref 34.8–46.1)
HGB BLD-MCNC: 11.1 G/DL (ref 11.5–15.4)
HGB BLD-MCNC: 11.4 G/DL (ref 11.5–15.4)
IGG/ALB SER: 1.35 {RATIO} (ref 1.1–1.8)
IMM GRANULOCYTES # BLD AUTO: 0.03 THOUSAND/UL (ref 0–0.2)
IMM GRANULOCYTES NFR BLD AUTO: 0 % (ref 0–2)
LYMPHOCYTES # BLD AUTO: 1.48 THOUSANDS/ÂΜL (ref 0.6–4.47)
LYMPHOCYTES NFR BLD AUTO: 17 % (ref 14–44)
M PROTEIN 1 MFR SERPL ELPH: 3 %
M PROTEIN 1 SERPL ELPH-MCNC: 0.18 G/DL
MCH RBC QN AUTO: 29.4 PG (ref 26.8–34.3)
MCH RBC QN AUTO: 29.8 PG (ref 26.8–34.3)
MCHC RBC AUTO-ENTMCNC: 32.4 G/DL (ref 31.4–37.4)
MCHC RBC AUTO-ENTMCNC: 32.5 G/DL (ref 31.4–37.4)
MCV RBC AUTO: 91 FL (ref 82–98)
MCV RBC AUTO: 92 FL (ref 82–98)
MONOCYTES # BLD AUTO: 0.96 THOUSAND/ÂΜL (ref 0.17–1.22)
MONOCYTES NFR BLD AUTO: 11 % (ref 4–12)
NEUTROPHILS # BLD AUTO: 5.86 THOUSANDS/ÂΜL (ref 1.85–7.62)
NEUTS SEG NFR BLD AUTO: 68 % (ref 43–75)
NRBC BLD AUTO-RTO: 0 /100 WBCS
O2 CT BLDV-SCNC: 17.2 ML/DL
P AXIS: 101 DEGREES
PCO2 BLDV: 27.1 MM HG (ref 42–50)
PH BLDV: 7.5 [PH] (ref 7.3–7.4)
PLATELET # BLD AUTO: 250 THOUSANDS/UL (ref 149–390)
PLATELET # BLD AUTO: 265 THOUSANDS/UL (ref 149–390)
PMV BLD AUTO: 9.9 FL (ref 8.9–12.7)
PMV BLD AUTO: 9.9 FL (ref 8.9–12.7)
PO2 BLDV: 128.4 MM HG (ref 35–45)
POTASSIUM SERPL-SCNC: 4.4 MMOL/L (ref 3.5–5.3)
POTASSIUM SERPL-SCNC: 4.4 MMOL/L (ref 3.5–5.3)
POTASSIUM SERPL-SCNC: 4.5 MMOL/L (ref 3.5–5.3)
PR INTERVAL: 416 MS
PROCALCITONIN SERPL-MCNC: 0.11 NG/ML
PROCALCITONIN SERPL-MCNC: 0.13 NG/ML
PROT PATTERN SERPL ELPH-IMP: ABNORMAL
PROT SERPL-MCNC: 5.9 G/DL (ref 6.4–8.2)
QRS AXIS: 140 DEGREES
QRSD INTERVAL: 156 MS
QT INTERVAL: 294 MS
QTC INTERVAL: 330 MS
RBC # BLD AUTO: 3.73 MILLION/UL (ref 3.81–5.12)
RBC # BLD AUTO: 3.88 MILLION/UL (ref 3.81–5.12)
SODIUM SERPL-SCNC: 125 MMOL/L (ref 135–147)
SODIUM SERPL-SCNC: 126 MMOL/L (ref 135–147)
SODIUM SERPL-SCNC: 128 MMOL/L (ref 135–147)
T WAVE AXIS: 151 DEGREES
TSH SERPL DL<=0.05 MIU/L-ACNC: 3.42 UIU/ML (ref 0.45–4.5)
VENTRICULAR RATE: 76 BPM
WBC # BLD AUTO: 7.53 THOUSAND/UL (ref 4.31–10.16)
WBC # BLD AUTO: 8.64 THOUSAND/UL (ref 4.31–10.16)

## 2024-07-27 PROCEDURE — C1729 CATH, DRAINAGE: HCPCS

## 2024-07-27 PROCEDURE — 84165 PROTEIN E-PHORESIS SERUM: CPT | Performed by: STUDENT IN AN ORGANIZED HEALTH CARE EDUCATION/TRAINING PROGRAM

## 2024-07-27 PROCEDURE — 99232 SBSQ HOSP IP/OBS MODERATE 35: CPT | Performed by: INTERNAL MEDICINE

## 2024-07-27 PROCEDURE — 70496 CT ANGIOGRAPHY HEAD: CPT

## 2024-07-27 PROCEDURE — 94760 N-INVAS EAR/PLS OXIMETRY 1: CPT

## 2024-07-27 PROCEDURE — 80048 BASIC METABOLIC PNL TOTAL CA: CPT

## 2024-07-27 PROCEDURE — 84484 ASSAY OF TROPONIN QUANT: CPT

## 2024-07-27 PROCEDURE — 71045 X-RAY EXAM CHEST 1 VIEW: CPT

## 2024-07-27 PROCEDURE — 32555 ASPIRATE PLEURA W/ IMAGING: CPT | Performed by: RADIOLOGY

## 2024-07-27 PROCEDURE — 70498 CT ANGIOGRAPHY NECK: CPT

## 2024-07-27 PROCEDURE — 85027 COMPLETE CBC AUTOMATED: CPT

## 2024-07-27 PROCEDURE — 82805 BLOOD GASES W/O2 SATURATION: CPT

## 2024-07-27 PROCEDURE — 94664 DEMO&/EVAL PT USE INHALER: CPT

## 2024-07-27 PROCEDURE — 32555 ASPIRATE PLEURA W/ IMAGING: CPT

## 2024-07-27 PROCEDURE — NC001 PR NO CHARGE: Performed by: INTERNAL MEDICINE

## 2024-07-27 PROCEDURE — 94640 AIRWAY INHALATION TREATMENT: CPT

## 2024-07-27 PROCEDURE — 93005 ELECTROCARDIOGRAM TRACING: CPT

## 2024-07-27 PROCEDURE — 93010 ELECTROCARDIOGRAM REPORT: CPT | Performed by: INTERNAL MEDICINE

## 2024-07-27 PROCEDURE — 99223 1ST HOSP IP/OBS HIGH 75: CPT | Performed by: STUDENT IN AN ORGANIZED HEALTH CARE EDUCATION/TRAINING PROGRAM

## 2024-07-27 PROCEDURE — 76937 US GUIDE VASCULAR ACCESS: CPT

## 2024-07-27 PROCEDURE — 0W993ZZ DRAINAGE OF RIGHT PLEURAL CAVITY, PERCUTANEOUS APPROACH: ICD-10-PCS | Performed by: RADIOLOGY

## 2024-07-27 PROCEDURE — 84145 PROCALCITONIN (PCT): CPT

## 2024-07-27 PROCEDURE — 85025 COMPLETE CBC W/AUTO DIFF WBC: CPT

## 2024-07-27 PROCEDURE — 82948 REAGENT STRIP/BLOOD GLUCOSE: CPT

## 2024-07-27 RX ORDER — GUAIFENESIN/DEXTROMETHORPHAN 100-10MG/5
10 SYRUP ORAL EVERY 4 HOURS PRN
Status: DISCONTINUED | OUTPATIENT
Start: 2024-07-27 | End: 2024-07-29 | Stop reason: HOSPADM

## 2024-07-27 RX ORDER — TORSEMIDE 10 MG/1
10 TABLET ORAL DAILY
Status: DISCONTINUED | OUTPATIENT
Start: 2024-07-27 | End: 2024-07-29 | Stop reason: HOSPADM

## 2024-07-27 RX ORDER — LIDOCAINE WITH 8.4% SOD BICARB 0.9%(10ML)
SYRINGE (ML) INJECTION AS NEEDED
Status: COMPLETED | OUTPATIENT
Start: 2024-07-27 | End: 2024-07-27

## 2024-07-27 RX ORDER — GUAIFENESIN 600 MG/1
1200 TABLET, EXTENDED RELEASE ORAL EVERY 12 HOURS SCHEDULED
Status: DISCONTINUED | OUTPATIENT
Start: 2024-07-27 | End: 2024-07-29 | Stop reason: HOSPADM

## 2024-07-27 RX ORDER — ALBUTEROL SULFATE 90 UG/1
2 AEROSOL, METERED RESPIRATORY (INHALATION) EVERY 4 HOURS PRN
Status: DISCONTINUED | OUTPATIENT
Start: 2024-07-27 | End: 2024-07-29 | Stop reason: HOSPADM

## 2024-07-27 RX ADMIN — GABAPENTIN 300 MG: 300 CAPSULE ORAL at 21:02

## 2024-07-27 RX ADMIN — DILTIAZEM HYDROCHLORIDE 120 MG: 120 CAPSULE, COATED, EXTENDED RELEASE ORAL at 09:28

## 2024-07-27 RX ADMIN — EZETIMIBE 10 MG: 10 TABLET ORAL at 21:03

## 2024-07-27 RX ADMIN — IPRATROPIUM BROMIDE 0.5 MG: 0.5 SOLUTION RESPIRATORY (INHALATION) at 19:41

## 2024-07-27 RX ADMIN — FLECAINIDE ACETATE 150 MG: 50 TABLET ORAL at 09:28

## 2024-07-27 RX ADMIN — TORSEMIDE 10 MG: 10 TABLET ORAL at 09:28

## 2024-07-27 RX ADMIN — APIXABAN 5 MG: 5 TABLET, FILM COATED ORAL at 18:06

## 2024-07-27 RX ADMIN — GUAIFENESIN 1200 MG: 600 TABLET ORAL at 21:02

## 2024-07-27 RX ADMIN — LORATADINE 10 MG: 10 TABLET ORAL at 09:28

## 2024-07-27 RX ADMIN — INSULIN LISPRO 1 UNITS: 100 INJECTION, SOLUTION INTRAVENOUS; SUBCUTANEOUS at 21:14

## 2024-07-27 RX ADMIN — METOPROLOL SUCCINATE 75 MG: 50 TABLET, EXTENDED RELEASE ORAL at 09:28

## 2024-07-27 RX ADMIN — OXYCODONE HYDROCHLORIDE AND ACETAMINOPHEN 500 MG: 500 TABLET ORAL at 09:28

## 2024-07-27 RX ADMIN — APIXABAN 5 MG: 5 TABLET, FILM COATED ORAL at 09:28

## 2024-07-27 RX ADMIN — ROPINIROLE HYDROCHLORIDE 2 MG: 1 TABLET, FILM COATED ORAL at 21:03

## 2024-07-27 RX ADMIN — ZOLPIDEM TARTRATE 10 MG: 5 TABLET, COATED ORAL at 21:05

## 2024-07-27 RX ADMIN — GUAIFENESIN AND DEXTROMETHORPHAN 10 ML: 100; 10 SYRUP ORAL at 13:53

## 2024-07-27 RX ADMIN — Medication 20 ML: at 17:20

## 2024-07-27 RX ADMIN — FLECAINIDE ACETATE 150 MG: 50 TABLET ORAL at 18:07

## 2024-07-27 RX ADMIN — GUAIFENESIN AND DEXTROMETHORPHAN 10 ML: 100; 10 SYRUP ORAL at 09:40

## 2024-07-27 RX ADMIN — LIDOCAINE 1 PATCH: 700 PATCH TOPICAL at 09:29

## 2024-07-27 RX ADMIN — INSULIN GLARGINE 10 UNITS: 100 INJECTION, SOLUTION SUBCUTANEOUS at 21:02

## 2024-07-27 RX ADMIN — GUAIFENESIN 1200 MG: 600 TABLET ORAL at 09:28

## 2024-07-27 RX ADMIN — ACETAMINOPHEN 650 MG: 325 TABLET, FILM COATED ORAL at 18:08

## 2024-07-27 RX ADMIN — METOPROLOL SUCCINATE 75 MG: 50 TABLET, EXTENDED RELEASE ORAL at 18:07

## 2024-07-27 RX ADMIN — IOHEXOL 85 ML: 350 INJECTION, SOLUTION INTRAVENOUS at 01:11

## 2024-07-27 RX ADMIN — IPRATROPIUM BROMIDE 0.5 MG: 0.5 SOLUTION RESPIRATORY (INHALATION) at 14:35

## 2024-07-27 RX ADMIN — IPRATROPIUM BROMIDE 0.5 MG: 0.5 SOLUTION RESPIRATORY (INHALATION) at 08:54

## 2024-07-27 RX ADMIN — GUAIFENESIN AND DEXTROMETHORPHAN 10 ML: 100; 10 SYRUP ORAL at 21:05

## 2024-07-27 NOTE — CONSULTS
NEUROLOGY RESIDENCY CONSULT NOTE     Name: Farnaz Martin   Age & Sex: 79 y.o. female   MRN: [id number]  Unit/Bed#: S -01   Encounter: [id number]  Length of Stay: 3    Recommendations for outpatient neurological follow up have yet to be determined.  ASSESSMENT & PLAN   #EPISODE OF ENCEPHALOPATHY  78 YO F with history of Afib on AC, thyroid disease, HLD, HTN, meningioma, renal cancer, and lumbar radiculopathy who initially presented with generalized fatigued and addmited for pleural effusion, hyponatremia, and KIMBERLEY. Overnight stroke alert called as patient was disoriented and agitated. At that time patient was requiring increased oxygen supplementation and labs indicated ongoing hyponatremia. Imaging consisting of CTH and CTA H/N showed known meningioma of the left frontal lobe and mild athero of the cavernous supraclinoid carotids. She was not a TNK candidate secondary to active AC use.    At this time unclear etiology of this event. Patient is currently back at her baseline and reports she had similar symptoms during prior hospitalization with low sodium levels. Favoring TME with superimposed hospital delirium given the context of her episode. She does have a meningioma which puts her at higher risk for seizure activity and I cannot fully exclude the possibility that this event was epileptic in nature.    After discussing with family and patient, they would like to hold off on starting any AEDs and obtain MRI imaging at this time which is reasonable as they are leaning more towards focusing on making the patient comfortable. Would repeat NC CTH tomorrow if MRI is not obtained. Seizure Precautions.    SUBJECTIVE   Reason for Consult / Principal Problem: Stroke Alert  Hx and PE limited by: None  HPI: 79-year-old female with a past medical history of atrial fibrillation on AC, CHF, thyroid disease, myalgia, hyperlipidemia, hypertension, meningioma, renal cancer, right lumbar radiculopathy who presented on  7/24/2024 for evaluation of generalized fatigue and presyncopal episode. Workup in the ED showed new small right-sided pleural effusion, elevated BNP, and acute kidney injury with elevated creatinine and was admitted for further workup and evaluation. Overnight medical team was called to bedside as patient desatted to 86% requiring oxygenation with NC. Patient was found to be agitated and encephalopathic. She was oriented to only self and year and was insisting of being home. There was apparently concern for possible stroke like symptoms and stroke alert was called. NIHSSS 10 (1 point for field loss, 1 point for each arm, 3 points for each leg, 1 point for mild dysarthria). CTH without intracranial hemorrhage, approximately 3.3 x 1.6 cm extra-axial meningioma overlying the lateral left frontal lobe with mild mass effect on the underlying bran, and moderate microangiopathy. CTA Head/Neck showed mild atherosclerosis of the cavernous supraclinoid carotids. She was deemed not a candidate for TNK secondary to AC use.     Inpatient consult to Neurology  Consult performed by: Mani Hernandez DO  Consult ordered by: Pauly Balderas DO      Historical Information   Past Medical History:   Diagnosis Date    Actinic keratosis     last assessed - 06Jun2014    Arthritis     Atrial fibrillation with rapid ventricular response (HCC)     last assessed - 26Apr2016    Atrial fibrillation with rapid ventricular response (HCC) 04/19/2016    Basal cell carcinoma     Benign colon polyp     last assessed - 27Apr2015    Bronchiectasis without complication (HCC)     CHF (congestive heart failure) (HCC) 06/2022    Chronic diastolic CHF (congestive heart failure) (HCC)     Disease of thyroid gland     Effusion of knee joint right     Resolved - 19Apr2016    Esophageal reflux     Fibromyalgia     last assessed - 27Dec2017    Fibromyalgia, primary     GERD (gastroesophageal reflux disease)     Hyperlipidemia     Hypertension      Hyponatremia     Malignant neoplasm of thyroid gland (HCC)     Meningioma (HCC)     MGUS (monoclonal gammopathy of unknown significance)     Palpitations     last assessed - [id number]    Peroneal tendonitis, right     last assessed - [id number]    Right lumbar radiculopathy 03/17/2016    Thyroid cancer (HCC)     Thyroid nodule     Trochanteric bursitis of left hip 03/09/2018     Past Surgical History:   Procedure Laterality Date    CARDIAC ELECTROPHYSIOLOGY STUDY AND ABLATION  08/2022    CATARACT EXTRACTION Bilateral     COLONOSCOPY  03/2018    COLONOSCOPY W/ POLYPECTOMY  2021    repeat in 5 years    EYE SURGERY      OOPHORECTOMY      MO NEUROPLASTY &/TRANSPOS MEDIAN NRV CARPAL TUNNE Right 11/14/2019    Procedure: RELEASE CARPAL TUNNEL;  Surgeon: Onesimo Tate MD;  Location: [facility];  Service: Orthopedics    MO TOTAL THYROID LOBECTOMY UNI W/WO ISTHMUSECTOMY Left 12/16/2020    Procedure: Left THYROID LOBECTOMY;  Surgeon: Jhony Bhatt MD;  Location: [facility];  Service: ENT    RECTAL POLYPECTOMY      REPLACEMENT TOTAL KNEE Left     last assessed - [id number]    TONSILLECTOMY      TOTAL ABDOMINAL HYSTERECTOMY      TUBAL LIGATION      US GUIDED THYROID BIOPSY  10/14/2020     Social History   Social History     Substance and Sexual Activity   Alcohol Use Not Currently     Social History     Substance and Sexual Activity   Drug Use Never     E-Cigarette/Vaping    E-Cigarette Use Never User      E-Cigarette/Vaping Substances    Nicotine No     THC No     CBD No     Flavoring No     Other No     Unknown No      Social History     Tobacco Use   Smoking Status Never   Smokeless Tobacco Never     Family History:   Family History   Problem Relation Age of Onset    Heart disease Mother     Diabetes Mother     Heart disease Father     Coronary artery disease Father     Stroke Father         cerebrovascular accident    Heart attack Father         myocardial infarction    Sudden death Father         scd    Other Family          Back disorder    Coronary artery disease Family     Neuropathy Family     Osteoporosis Family     No Known Problems Daughter     No Known Problems Maternal Grandmother     No Known Problems Maternal Grandfather     No Known Problems Paternal Grandmother     No Known Problems Paternal Grandfather     Cancer Maternal Uncle     Breast cancer Maternal Aunt 65    No Known Problems Son     No Known Problems Maternal Aunt     No Known Problems Maternal Aunt     No Known Problems Maternal Aunt     No Known Problems Paternal Aunt     No Known Problems Paternal Aunt     Anuerysm Neg Hx     Clotting disorder Neg Hx     Arrhythmia Neg Hx     Heart failure Neg Hx      Meds/Allergies     Allergies   Allergen Reactions    Sotalol Other (See Comments)     Prolonged QTc leading to torsades de pointes     Penicillins Other (See Comments)     As a child calcium deposit in the arm     Ace Inhibitors GI Intolerance     Did feel well on it    Omeprazole Abdominal Pain, Rash and Vomiting     stomach pain, vomiting, rash           Review of Systems   Constitutional:  Positive for fatigue. Negative for chills, fever and unexpected weight change.   HENT:  Negative for drooling, hearing loss, sinus pressure, sinus pain, tinnitus, trouble swallowing and voice change.    Eyes:  Negative for photophobia and visual disturbance.   Respiratory:  Negative for chest tightness and shortness of breath.    Cardiovascular:  Negative for chest pain, palpitations and leg swelling.   Gastrointestinal:  Negative for constipation, diarrhea and nausea.   Endocrine: Negative for cold intolerance.   Genitourinary:  Negative for difficulty urinating.   Musculoskeletal:  Negative for arthralgias, back pain, gait problem, myalgias, neck pain and neck stiffness.   Skin:  Negative for pallor and rash.   Neurological:  Negative for dizziness, tremors, seizures, syncope, facial asymmetry, speech difficulty, weakness, light-headedness, numbness and headaches.    Psychiatric/Behavioral:  Negative for behavioral problems, confusion, decreased concentration and sleep disturbance.        OBJECTIVE     Patient ID: Farnaz Martin is a 79 y.o. female.    Vitals:   Vitals:    24 0357 24 0507 24 0708 24 0854   BP: 131/84 124/80 121/77    Pulse: 77 77 84 85   Resp:   18 20   Temp:   97.8 °F (36.6 °C)    TempSrc:       SpO2: 100% 94% 100% 98%   Weight:       Height:          Body mass index is 29.56 kg/m².     Intake/Output Summary (Last 24 hours) at 2024 1205  Last data filed at 2024 0528  Gross per 24 hour   Intake --   Output 250 ml   Net -250 ml       Temperature:   Temp (24hrs), Av.2 °F (36.8 °C), Min:97.6 °F (36.4 °C), Max:98.9 °F (37.2 °C)    Temperature: 97.8 °F (36.6 °C)    Invasive Devices:   Invasive Devices       Peripheral Intravenous Line  Duration             Peripheral IV 24 Right Antecubital 2 days    Peripheral IV 24 Right;Ventral (anterior) Forearm <1 day                    Physical Exam  Eyes:      Extraocular Movements: EOM normal.      Pupils: Pupils are equal, round, and reactive to light.   Neurological:      Mental Status: She is oriented to person, place, and time.      Cranial Nerves: Cranial nerves 2-12 are intact.      Coordination: Finger-Nose-Finger Test, Heel to Shin Test and Romberg Test normal.      Gait: Gait is intact. Tandem walk normal.      Deep Tendon Reflexes:      Reflex Scores:       Bicep reflexes are 2+ on the right side and 2+ on the left side.       Brachioradialis reflexes are 2+ on the right side and 2+ on the left side.       Patellar reflexes are 2+ on the right side and 2+ on the left side.       Achilles reflexes are 2+ on the right side and 2+ on the left side.  Psychiatric:         Speech: Speech normal.          Neurologic Exam     Mental Status   Oriented to person, place, and time.   Oriented to person.   Oriented to place.   Follows 3 step commands.   Attention: normal.  Concentration: normal.   Speech: speech is normal   Level of consciousness: alert  Knowledge: good.   Able to name object. Able to read. Able to repeat. Normal comprehension.     Cranial Nerves   Cranial nerves II through XII intact.     CN II   Visual fields full to confrontation.     CN III, IV, VI   Pupils are equal, round, and reactive to light.  Extraocular motions are normal.   Right pupil: Shape: regular. Reactivity: brisk. Consensual response: intact. Accommodation: intact.   Left pupil: Shape: regular. Reactivity: brisk. Consensual response: intact. Accommodation: intact.   CN III: no CN III palsy  CN VI: no CN VI palsy  Nystagmus: none   Diplopia: none  Ophthalmoparesis: none  Upgaze: normal  Downgaze: normal    CN V   Facial sensation intact.   Right facial sensation deficit: none  Left facial sensation deficit: none    CN VII   Facial expression full, symmetric.     CN VIII   CN VIII normal.     CN IX, X   CN IX normal.   CN X normal.     CN XI   CN XI normal.     CN XII   CN XII normal.   Tongue: not atrophic  Fasciculations: absent  Tongue deviation: none    Motor Exam   Muscle bulk: normal  Overall muscle tone: normal  Right arm tone: normal  Left arm tone: normal  Right arm pronator drift: absent  Left arm pronator drift: absent  Right leg tone: normal  Left leg tone: normal    Strength   Strength 5/5 except as noted.   Right iliopsoas: 4/5  Left iliopsoas: 4/5  Right quadriceps: 4/5  Left quadriceps: 4/5    Sensory Exam   Light touch normal.   Vibration normal.   Right leg proprioception: normal  Left leg proprioception: normal  Pinprick normal.     Gait, Coordination, and Reflexes     Gait  Gait: normal    Coordination   Romberg: negative  Finger to nose coordination: normal  Heel to shin coordination: normal  Tandem walking coordination: normal    Tremor   Resting tremor: absent  Intention tremor: absent  Action tremor: absent    Reflexes   Reflexes 2+ except as noted.   Right brachioradialis:  2+  Left brachioradialis: 2+  Right biceps: 2+  Left biceps: 2+  Right patellar: 2+  Left patellar: 2+  Right achilles: 2+  Left achilles: 2+  Right plantar: normal  Left plantar: normal         LABORATORY DATA     Labs: I have personally reviewed pertinent reports.    Results from last 7 days   Lab Units 07/27/24  0546 07/27/24  0012 07/26/24  0520 07/25/24  0436 07/24/24  1551   WBC Thousand/uL 7.53 8.64 8.57 8.30 7.48   HEMOGLOBIN g/dL 11.1* 11.4* 11.5 11.9 11.8   HEMATOCRIT % 34.2* 35.2 35.0 37.2 36.6   PLATELETS Thousands/uL 250 265 283 281 282   SEGS PCT %  --  68  --  61 63   MONO PCT %  --  11  --  14* 13*   EOS PCT %  --  3  --  2 3      Results from last 7 days   Lab Units 07/27/24  0730 07/27/24  0241 07/27/24  0012 07/25/24  0436 07/24/24  1551   POTASSIUM mmol/L 4.4 4.4 4.5   < > 4.5   CHLORIDE mmol/L 96 96 97   < > 93*   CO2 mmol/L 25 22 22   < > 23   BUN mg/dL 39* 42* 44*   < > 50*   CREATININE mg/dL 1.11 1.11 1.17   < > 1.64*   CALCIUM mg/dL 9.0 8.8 8.8   < > 9.0   ALK PHOS U/L  --   --   --   --  71   ALT U/L  --   --   --   --  52   AST U/L  --   --   --   --  46*    < > = values in this interval not displayed.     Results from last 7 days   Lab Units 07/25/24  0436 07/24/24  1045   MAGNESIUM mg/dL 2.4 2.3     Results from last 7 days   Lab Units 07/25/24 0436   PHOSPHORUS mg/dL 4.9*                    IMAGING & DIAGNOSTIC TESTING     Radiology Results: I have personally reviewed pertinent reports.    CT stroke alert brain   Final Result by Angelica Pimentel MD (07/27 0217)      CT Brain: No acute intracranial hemorrhage. There is an approximately 3.3 x 1.6 cm extra-axial meningioma overlying the lateral left frontal lobe. There is mild mass effect on the underlying brain. There is no definite CT evidence of edema within the    underlying brain. Moderate microangiopathy appears similar to July 13, 2024. MRI with diffusion-weighted imaging is recommended for further evaluation, if there are no  contraindications.         CT Angiography: Mild atherosclerosis, as described above. No evidence of hemodynamically significant stenosis. No evidence of large vessel occlusion.      Abnormal appearance of the right lobe of the thyroid, as described above. Please see discussion. Thyroid ultrasound is recommended for further evaluation.      Large right pleural effusion. Small left pleural effusion. Atelectasis and edema bilateral upper lungs, right greater than left.      This examination demonstrates findings for which imaging follow-up is recommended and was logged as such in EPIC.               I personally discussed this study with SUZIE BEEBE on 7/27/2024 2:03 AM.                  Workstation performed: [id number]         CTA stroke alert (head/neck)   Final Result by Angelica Pimentel MD (07/27 0217)      CT Brain: No acute intracranial hemorrhage. There is an approximately 3.3 x 1.6 cm extra-axial meningioma overlying the lateral left frontal lobe. There is mild mass effect on the underlying brain. There is no definite CT evidence of edema within the    underlying brain. Moderate microangiopathy appears similar to July 13, 2024. MRI with diffusion-weighted imaging is recommended for further evaluation, if there are no contraindications.         CT Angiography: Mild atherosclerosis, as described above. No evidence of hemodynamically significant stenosis. No evidence of large vessel occlusion.      Abnormal appearance of the right lobe of the thyroid, as described above. Please see discussion. Thyroid ultrasound is recommended for further evaluation.      Large right pleural effusion. Small left pleural effusion. Atelectasis and edema bilateral upper lungs, right greater than left.      This examination demonstrates findings for which imaging follow-up is recommended and was logged as such in EPIC.               I personally discussed this study with SUZIE BEEBE on 7/27/2024 2:03 AM.                   Workstation performed: [id number]         XR chest portable   Final Result by Bertha Richter MD (07/27 0741)      Persistent moderate right pleural effusion and right base atelectasis.      Mild pulmonary venous congestion.            Workstation performed: [id number]         XR chest 1 view portable   ED Interpretation by Odalis Bronson MD (07/24 1835)   Worsening right plural effusion, mild pulm vasc congestion      Final Result by Bertha Richter MD (07/24 2035)      Mild pulmonary venous congestion with slight increase in moderate right effusion.      Mild subsegmental atelectasis in the right midlung.            Workstation performed: [id number]         MRI brain w wo contrast    (Results Pending)   IR IN-Patient Thoracentesis    (Results Pending)       Other Diagnostic Testing: I have personally reviewed pertinent reports.      ACTIVE MEDICATIONS     Current Facility-Administered Medications   Medication Dose Route Frequency    acetaminophen (TYLENOL) tablet 650 mg  650 mg Oral Q6H PRN    albuterol (PROVENTIL HFA,VENTOLIN HFA) inhaler 2 puff  2 puff Inhalation Q4H PRN    apixaban (ELIQUIS) tablet 5 mg  5 mg Oral BID    ascorbic acid (VITAMIN C) tablet 500 mg  500 mg Oral Daily    dextromethorphan-guaiFENesin (ROBITUSSIN DM) oral syrup 10 mL  10 mL Oral Q4H PRN    diltiazem (CARDIZEM CD) 24 hr capsule 120 mg  120 mg Oral Daily    ezetimibe (ZETIA) tablet 10 mg  10 mg Oral HS    flecainide (TAMBOCOR) tablet 150 mg  150 mg Oral BID    gabapentin (NEURONTIN) capsule 300 mg  300 mg Oral HS    guaiFENesin (MUCINEX) 12 hr tablet 1,200 mg  1,200 mg Oral Q12H JOAQUINA    insulin glargine (LANTUS) subcutaneous injection 10 Units 0.1 mL  10 Units Subcutaneous HS    insulin lispro (HumALOG/ADMELOG) 100 units/mL subcutaneous injection 1-6 Units  1-6 Units Subcutaneous 4x Daily (PC & HS)    ipratropium (ATROVENT) 0.02 % inhalation solution 0.5 mg  0.5 mg Nebulization TID    lidocaine (LIDODERM) 5 % patch 1 patch  1  patch Topical Daily    loratadine (CLARITIN) tablet 10 mg  10 mg Oral Daily    metoprolol succinate (TOPROL-XL) 24 hr tablet 75 mg  75 mg Oral Q12H    polyethylene glycol (MIRALAX) packet 17 g  17 g Oral Daily    rOPINIRole (REQUIP) tablet 2 mg  2 mg Oral HS    senna-docusate sodium (SENOKOT S) 8.6-50 mg per tablet 1 tablet  1 tablet Oral HS    torsemide (DEMADEX) tablet 10 mg  10 mg Oral Daily    zolpidem (AMBIEN) tablet 10 mg  10 mg Oral HS PRN       Prior to Admission medications    Medication Sig Start Date End Date Taking? Authorizing Provider   albuterol (ProAir HFA) 90 mcg/act inhaler Inhale 2 puffs every 6 (six) hours as needed for wheezing 1/30/24  Yes Anabella Dougherty MD   apixaban (ELIQUIS) 5 mg Take 1 tablet (5 mg total) by mouth 2 (two) times a day 1/17/24  Yes Nir Cunningham DO   ascorbic Acid (VITAMIN C) 500 MG CPCR Take 500 mg by mouth daily   Yes Historical Provider, MD   Cetirizine HCl (ZyrTEC Allergy) 10 MG CAPS Take 10 mg by mouth in the morning   Yes Historical Provider, MD   Cholecalciferol 50 MCG (2000 UT) CAPS Take 2,000 Units by mouth 2 (two) times a day   Yes Historical Provider, MD   Chromium Picolinate (CHROMIUM PICOLATE PO) Take 1 tablet by mouth daily   Yes Historical Provider, MD   dapagliflozin 5 MG TABS Take 1 tablet (5 mg total) by mouth daily 5/31/24 11/27/24 Yes Naveen Monsivais MD   diltiazem (CARDIZEM CD) 120 mg 24 hr capsule Take 120 mg by mouth daily   Yes Historical Provider, MD   ezetimibe (ZETIA) 10 mg tablet Take 1 tablet (10 mg total) by mouth daily at bedtime 5/27/24  Yes Kyle Brunner, MD   famotidine (PEPCID) 20 mg tablet Take 20 mg by mouth 2 (two) times a day M, W, F in the AM   Yes Historical Provider, MD   flecainide (TAMBOCOR) 100 mg tablet Take 1 tablet (100 mg total) by mouth 2 (two) times a day  Patient taking differently: Take 150 mg by mouth 2 (two) times a day 6/28/24  Yes Nir Cunningham,    furosemide (LASIX) 20 mg tablet Take 2 tablets (40  mg total) by mouth daily take 2 tablet by mouth daily if needed for shortness of breath WEIGHT GAIN 3 LBS IN 1 DAY OR LOWER LEG SWELLING 7/17/24 8/16/24 Yes Kwabena Dey MD   gabapentin (NEURONTIN) 300 mg capsule take 1 capsule by mouth at bedtime 2/18/24  Yes Naveen Monsivais MD   glucose blood (OneTouch Verio) test strip Check blood sugars once daily. Please substitute with appropriate alternative as covered by patient's insurance. Dx: E11.65 7/11/24  Yes Naveen Monsivais MD   Insulin Glargine Solostar (Lantus SoloStar) 100 UNIT/ML SOPN Inject 0.1 mL (10 Units total) under the skin daily at bedtime 7/11/24 5/7/25 Yes Naveen Monsivais MD   Insulin Pen Needle 31G X 4 MM MISC Use daily at bedtime 7/11/24  Yes Naveen Monsivais MD   metoprolol succinate (TOPROL-XL) 25 mg 24 hr tablet take 3 tablets by mouth every 12 hours 7/23/24  Yes Elise Ulloa PA-C   OneTouch Delica Lancets 33G MISC Check blood sugars once daily. Please substitute with appropriate alternative as covered by patient's insurance. Dx: E11.65 7/11/24  Yes Naveen Monsivais MD   rOPINIRole (REQUIP) 1 mg tablet Take 2 tablets (2 mg total) by mouth daily at bedtime 5/6/24  Yes Apple Lobato MD   sodium chloride 3 % inhalation solution Take 4 mL by nebulization as needed for cough 3/25/24  Yes JENNY Enriquez   tiotropium (Spiriva Respimat) 2.5 MCG/ACT AERS inhaler Inhale 2 puffs daily 6/14/24  Yes JENNY Enriquez   TURMERIC PO Take 1 capsule by mouth 2 (two) times a day   Yes Historical Provider, MD   zolpidem (AMBIEN) 10 mg tablet Take 1 tablet (10 mg total) by mouth daily at bedtime as needed for sleep 1/23/24  Yes Josie Wynn MD   benzonatate (TESSALON PERLES) 100 mg capsule Take 1 capsule (100 mg total) by mouth 3 (three) times a day as needed for cough  Patient not taking: Reported on 7/24/2024 6/15/24   JENNY Enriquez   ipratropium (ATROVENT) 0.03 % nasal spray 2 sprays into each nostril 3 (three) times a day  Begin once per day, then progress to twice per day. Progress to three times a day as tolerated.  Patient taking differently: 2 sprays into each nostril if needed Begin once per day, then progress to twice per day. Progress to three times a day as tolerated. 1/25/24   Anabella Dougherty MD   predniSONE 10 mg tablet  5/27/24   Historical Provider, MD       CODE STATUS & ADVANCED DIRECTIVES     Code Status: Level 1 - Full Code  Advance Directive and Living Will:      Power of :    POLST:        VTE Pharmacologic Prophylaxis: Apixaban (Eliquis)  VTE Mechanical Prophylaxis: sequential compression device    ======    I have discussed the patient's history, physical exam findings, assessment, and plan in detail with attending, Dr. Medeiros    Thank you for allowing me to participate in the care of your patient, Farnaz Martin.    Mani Hernandez,   [facility] Neurology Residency, PGY-3

## 2024-07-27 NOTE — QUICK NOTE
Called to bedside for desat: SpO2 desated to 86% started on 2L. Patient noted to be very agitation. Patient AAOx2 (self and year). Denies being in a hospital and insists on being at home, despite reorientation.  Attempting to get out of bed. Placed in posey.     Neuro exam Deficits:  AAOx2  Bilateral upper visual fields absent   Unable to trace gaze, can over horizontal  Bilateral Pronator drift, does not touch bed  Bilateral leg weakness, unable to lift against gravity  Muffled speech with slight slur  Inability to due nose to finger      NIHSS: 11 (1+ partial gaze palsy, 1+ partial hemianopia, right arm drift +1, left arm drift +1, left leg no effort against gravity, right leg no effort against gravity, +1 slurred speech)    Last known normal: 10pm    Glucse: 138  BP: 123\75     Cranial Nerves  CN II: Bilateral upper visual fields absent   CN III, IV, VI: Unable to trace gaze, can over horizontal. Normal lids and orbits bilaterally. Pupils equal round and reactive to light bilaterally.  CN V: Facial sensation is normal.  CN VII: Full and symmetric facial movement.  CN VIII: Hearing is normal.  CN IX, X: Palate elevates symmetrically  CN XI: Shoulder shrug strength is normal.  CN XII: Tongue midline without atrophy or fasciculations.    Bilateral Pronator drift  Upper extremity strength intact and symmetrical  Unable to lift legs off bed  Tremor with finger to nose, misses finger able to locate nose bilaterally    Harsh barky cough, diminished lung sounds, R> L     Latest Reference Range & Units 07/27/24 00:12   pH, Dl 7.300 - 7.400  7.500 (H)   pCO2, Dl 42.0 - 50.0 mm Hg 27.1 (L)   pO2, Dl 35.0 - 45.0 mm Hg 128.4 (H)   HCO3, Dl 24 - 30 mmol/L 20.7 (L)   Base Excess, Dl mmol/L -1.3   O2 Content, Dl ml/dL 17.2   O2 HGB, VENOUS 60.0 - 80.0 % 95.1 (H)   Sodium 135 - 147 mmol/L 126 (L)   Potassium 3.5 - 5.3 mmol/L 4.5   Chloride 96 - 108 mmol/L 97   Carbon Dioxide 21 - 32 mmol/L 22   ANION GAP 4 - 13 mmol/L 7    BUN 5 - 25 mg/dL 44 (H)   Creatinine 0.60 - 1.30 mg/dL 1.17   GLUCOSE 65 - 140 mg/dL 134   Calcium 8.4 - 10.2 mg/dL 8.8   GFR, Calculated ml/min/1.73sq m 44   Procalcitonin <=0.25 ng/ml 0.13   WBC 4.31 - 10.16 Thousand/uL 8.64   RBC 3.81 - 5.12 Million/uL 3.88   Hemoglobin 11.5 - 15.4 g/dL 11.4 (L)   Hematocrit 34.8 - 46.1 % 35.2   MCV 82 - 98 fL 91   MCH 26.8 - 34.3 pg 29.4   MCHC 31.4 - 37.4 g/dL 32.4   RDW 11.6 - 15.1 % 13.9   Platelet Count 149 - 390 Thousands/uL 265   MPV 8.9 - 12.7 fL 9.9   nRBC /100 WBCs 0   (H): Data is abnormally high  (L): Data is abnormally low    CXR: Severe right pleural effusion with possible LLL consolidation    Plan:  Stroke Alert. Discussed with Neuro  CT head  CT H/N  Procal and AM procal  Start Ceftriaxone   Posey in place    Addendum 3am:    CT head and head/neck:  CT Brain: No acute intracranial hemorrhage. There is an approximately 3.3 x 1.6 cm extra-axial meningioma overlying the lateral left frontal lobe. There is mild mass effect on the underlying brain. There is no definite CT evidence of edema within the  underlying brain. Moderate microangiopathy appears similar to July 13, 2024. MRI with diffusion-weighted imaging is recommended for further evaluation, if there are no contraindications.     CT Angiography: Mild atherosclerosis, as described above. No evidence of hemodynamically significant stenosis. No evidence of large vessel occlusion.     Abnormal appearance of the right lobe of the thyroid, as described above. Please see discussion. Thyroid ultrasound is recommended for further evaluation.     Large right pleural effusion. Small left pleural effusion. Atelectasis and edema bilateral upper lungs, right greater than left.    Plan:  Discussed results with Neurology, Recommended to consider getting an MRI brain w/wo because of the meningioma, unwitnessed seizure and postictal confusion in the differential

## 2024-07-27 NOTE — DISCHARGE INSTRUCTIONS
Thoracentesis   WHAT YOU NEED TO KNOW:   A thoracentesis is a procedure to remove extra fluid or air from between your lungs and your inner chest wall. Air or fluid buildup may make it hard for you to breathe. A thoracentesis allows your lungs to expand fully so you can breathe more easily.    DISCHARGE INSTRUCTIONS:     Small amount of shoulder pain and bloody sputum is normal after a Thoracentesis.     Rest:  Rest when you feel it is needed. Slowly start to do more each day. Return to your daily activities as directed.     Resume your normal diet. Small sips of flat soda will help mild nausea.    Do not smoke:  If you smoke, it is never too late to quit. Ask for information about how to stop smoking if you need help.    Contact Interventional Radiology at [phone] ([facility] PATIENTS: Contact Interventional Radiology at [phone]) ([facility] PATIENTS: Contact Interventional Radiology at [phone]) if:   You have a fever.    Your puncture site is red, warm, swollen, or draining pus.    You have questions or concerns about your procedure, medicine, or care.    Seek care immediately or call 911 if:   Severe chest pain with inspiration and shortness of breath    Large amounts of blood in your sputum    Follow up with your healthcare provider as directed.

## 2024-07-27 NOTE — ASSESSMENT & PLAN NOTE
CT head/neck 7/27/2024: right lobe of the thyroid is enlarged and heterogeneous and appears to contain several somewhat complex appearing nodules. The largest nodule is hypodense and measures 2.4 cm. Incidental discovery of one or more thyroid   nodule(s) demonstrating suspicious     Plan:   Incidental finding  Follow-up with thyroid ultrasound outpatient

## 2024-07-27 NOTE — PLAN OF CARE
Problem: PAIN - ADULT  Goal: Verbalizes/displays adequate comfort level or baseline comfort level  Description: Interventions:  - Encourage patient to monitor pain and request assistance  - Assess pain using appropriate pain scale  - Administer analgesics based on type and severity of pain and evaluate response  - Implement non-pharmacological measures as appropriate and evaluate response  - Consider cultural and social influences on pain and pain management  - Notify physician/advanced practitioner if interventions unsuccessful or patient reports new pain  Outcome: Progressing     Problem: INFECTION - ADULT  Goal: Absence or prevention of progression during hospitalization  Description: INTERVENTIONS:  - Assess and monitor for signs and symptoms of infection  - Monitor lab/diagnostic results  - Monitor all insertion sites, i.e. indwelling lines, tubes, and drains  - Monitor endotracheal if appropriate and nasal secretions for changes in amount and color  - [place] appropriate cooling/warming therapies per order  - Administer medications as ordered  - Instruct and encourage patient and family to use good hand hygiene technique  - Identify and instruct in appropriate isolation precautions for identified infection/condition  Outcome: Progressing  Goal: Absence of fever/infection during neutropenic period  Description: INTERVENTIONS:  - Monitor WBC    Outcome: Progressing     Problem: SAFETY ADULT  Goal: Patient will remain free of falls  Description: INTERVENTIONS:  - Educate patient/family on patient safety including physical limitations  - Instruct patient to call for assistance with activity   - Consult OT/PT to assist with strengthening/mobility   - Keep Call bell within reach  - Keep bed low and locked with side rails adjusted as appropriate  - Keep care items and personal belongings within reach  - Initiate and maintain comfort rounds  - Make Fall Risk Sign visible to staff  - Initiate/Maintain bed/chair  alarm  - Obtain necessary fall risk management equipment  - Apply yellow socks and bracelet for high fall risk patients  - Consider moving patient to room near nurses station  Outcome: Progressing  Goal: Maintain or return to baseline ADL function  Description: INTERVENTIONS:  -  Assess patient's ability to carry out ADLs; assess patient's baseline for ADL function and identify physical deficits which impact ability to perform ADLs (bathing, care of mouth/teeth, toileting, grooming, dressing, etc.)  - Assess/evaluate cause of self-care deficits   - Assess range of motion  - Assess patient's mobility; develop plan if impaired  - Assess patient's need for assistive devices and provide as appropriate  - Encourage maximum independence but intervene and supervise when necessary  - Involve family in performance of ADLs  - Assess for home care needs following discharge   - Consider OT consult to assist with ADL evaluation and planning for discharge  - Provide patient education as appropriate  Outcome: Progressing  Goal: Maintains/Returns to pre admission functional level  Description: INTERVENTIONS:  - Perform AM-PAC 6 Click Basic Mobility/ Daily Activity assessment daily.  - Set and communicate daily mobility goal to care team and patient/family/caregiver.   - Collaborate with rehabilitation services on mobility goals if consulted  - Ambulate patient 4 times a day  - Out of bed for meals 3 times a day  - Out of bed for toileting  - Record patient progress and toleration of activity level   Outcome: Progressing     Problem: DISCHARGE PLANNING  Goal: Discharge to home or other facility with appropriate resources  Description: INTERVENTIONS:  - Identify barriers to discharge w/patient and caregiver  - Arrange for needed discharge resources and transportation as appropriate  - Identify discharge learning needs (meds, wound care, etc.)  - Arrange for interpretive services to assist at discharge as needed  - Refer to Case  Management Department for coordinating discharge planning if the patient needs post-hospital services based on physician/advanced practitioner order or complex needs related to functional status, cognitive ability, or social support system  Outcome: Progressing     Problem: Knowledge Deficit  Goal: Patient/family/caregiver demonstrates understanding of disease process, treatment plan, medications, and discharge instructions  Description: Complete learning assessment and assess knowledge base.  Interventions:  - Provide teaching at level of understanding  - Provide teaching via preferred learning methods  Outcome: Progressing

## 2024-07-27 NOTE — PROGRESS NOTES
[facility]  Progress Note  Name: Farnaz Martin I  MRN: [id number]  Unit/Bed#: S -01 I Date of Admission: 7/24/2024   Date of Service: 7/27/2024 I Hospital Day: 3    Assessment & Plan   * Acute kidney injury superimposed on CKD (chronic kidney disease) stage 3, GFR 30-59 ml/min ([facility])  Assessment & Plan    POA1.64; (baseline .9-1.1)   Increased weakness, fatigue since last hospital admission  Dry on exam during admission  Pt taking higher Lasix dose since last admission; increased from 20 mg daily to 40 mg  Suspect KIMBERLEY secondary to overdiuresis, poor oral intake    Plan:  Kidney function improving after gentle IV fluids. Continue to encourage po intake  Trial diuretics today after Lasix held  Decrease outpatient Lasix dose, per cardio  Follow kidney function w/ BMP   Avoid hypoperfusion of the kidneys, minimize nephrotoxins  Consider nephrology consultation if worsening KIMBERLEY      Fatigue  Assessment & Plan  Increased fatigue, weakness, poor oral intake since last hospitalization  Presyncope episode day of admission  Pt's Lasix dose increased from 20 to 40 mg daily since last admission   Suspect multifactorial, related to overdiuresis, poor oral intake, KIMBERLEY    Plan:   Fatigue improved following gentle fluids  PT/OT consult  Orthostatic vitals  Cardiac interrogation  Consider geriatric consult      Recurrent pleural effusion on right  Assessment & Plan  Hx right-sided pleural effusion in the setting of bronchiectasis and pulmonary hypertension  Underwent thoracentesis on 7/15/2024 with fluid suggested of transudate by effusion.    Chest x-ray on 7/22/2024 showed new small right-sided pleural effusion.    Chest x-ray on admission appears to show worsening right-sided pleural effusion   Patient reports taking Lasix 40 mg twice daily after discharge from the hospital.   Suspect recurrent pleural effusion secondary to heart failure.  R/p CXR shows persistent right pleural effusion,  increased congestion    Plan:  Consider r/p thoracentesis if symptomatic and doesn't resolve on own    Hyponatremia  Assessment & Plan  Patient with history of chronic hyponatremia, presents on admission with sodium of 128.  Baseline Sodium between 128-134  Serum osm 292; U osm 511; Kylie 11  Lipids low HDL  Glucose 153  Cortisol 20 wnl    Plan:   Trend BMP      Acute on chronic heart failure (HCC)  Assessment & Plan  Wt Readings from Last 3 Encounters:   07/26/24 73.3 kg (161 lb 9.6 oz)   07/22/24 72.5 kg (159 lb 12.8 oz)   07/17/24 72.1 kg (159 lb)       BNP elevated at 1,428 on admission    Plan:  Strict I/Os  Daily weights  Cardiology, nephrology consulted  Rest of plan as KIMBERLEY        Cough  Assessment & Plan  Pt has hx bronchiectasis since 2/2024  Carking cough since yesterday. Pt states similar to past episodes since bronchiectasis dx  Pt also endorsing orthopnea, SOB  Uses Spiriva inhaler and Robitussin at home for sx's  R/p chest x-ray shows increased congestion. Procalc neg  Cough component on bronchiectasis vs CHF in setting of holding Lasix     Plan:  Muxinex 1200 mg Q12 hrs scheduled  Robitussin 10 mL Q4 hrs prn  Atrovent neb four times daily  Reached out to cardiology to see about Lasix x1 today  Per cardio, trialing Torsemide 10 mg today      Stroke-like symptoms  Assessment & Plan  Stroke alert called after pt desat to 86%, presented with altered mental status and slurred speech last night  CT Head 7/27/2024: No acute intracranial hemorrhage. There is an approximately 3.3 x 1.6 cm extra-axial meningioma overlying the lateral left frontal lobe. There is mild mass effect on the underlying brain    Plan:  Altered mental status likely 2/2 delirium. Pt has hx delirium in hospital and is AAOx4 this morning  Neurology consulted  F/up MRI brain    Thyroid nodule  Assessment & Plan  CT head/neck 7/27/2024: right lobe of the thyroid is enlarged and heterogeneous and appears to contain several somewhat complex  appearing nodules. The largest nodule is hypodense and measures 2.4 cm. Incidental discovery of one or more thyroid   nodule(s) demonstrating suspicious     Plan:   Incidental finding  Follow-up with thyroid ultrasound outpatient    Paroxysmal A-fib (HCC)  Assessment & Plan  Afib-  S/p ablation in 03/2021.   EKG-Sinus rhythm with frequent Premature atrial complexes with 1st degree A-V block with frequent ventricular-paced complexes  Low voltage QRS    Plan:  Eliquis 5 mg twice daily  Continue flecainide 150 mg twice daily and metoprolol succinate 75 mg twice daily  Continue diltiazem 120 mg daily    Restless legs syndrome  Assessment & Plan  Continue ropinirole               VTE Pharmacologic Prophylaxis: VTE Score: 4 Moderate Risk (Score 3-4) - Pharmacological DVT Prophylaxis Ordered: apixaban (Eliquis).    Mobility:   Basic Mobility Inpatient Raw Score: 19  JH-HLM Goal: 6: Walk 10 steps or more  JH-HLM Achieved: 6: Walk 10 steps or more  JH-HLM Goal achieved. Continue to encourage appropriate mobility.    Patient Centered Rounds: I performed bedside rounds with nursing staff today.  Discussions with Specialists or Other Care Team Provider: Cardiology    Education and Discussions with Family / Patient: Updated  (daughter) at bedside.    Current Length of Stay: 3 day(s)  Current Patient Status: Inpatient   Discharge Plan: Anticipate discharge in 48-72 hrs to home with home services.    Code Status: Level 1 - Full Code    Subjective:   Spoke with pt at bedside. Pt's dry cough is worsened today, and she is now endorsing shortness of breath, orthopnea, and abdominal bloating. Pt states she has intermittent coughing episodes, has hx bronchiectasis since 2/2024 and her coughing at this time is not worse than these past episodes. Pt's Lasix dose was also held yesterday, and chest x-ray shows increased congestion. Denies fever, chills, extremity numbness.  Last night night team called to bedside after patient  had desat episode to 86% and altered mental status, AAOx1, slurred speech, leg weakness. Stroke alert was called. Pt's speech is not slurred today, she is AAOx4 and denies stroke-like symptoms. Pt states she has a history of delirium in the hospital. Neuro consulted and MRI ordered for follow-up of CT findings showing left frontal lobe meningioma with mass effect.    Objective:     Vitals:   Temp (24hrs), Av.2 °F (36.8 °C), Min:97.6 °F (36.4 °C), Max:98.9 °F (37.2 °C)    Temp:  [97.6 °F (36.4 °C)-98.9 °F (37.2 °C)] 97.8 °F (36.6 °C)  HR:  [54-84] 84  Resp:  [13-18] 18  BP: (106-131)/(57-84) 121/77  SpO2:  [91 %-100 %] 100 %  Body mass index is 29.56 kg/m².     Input and Output Summary (last 24 hours):     Intake/Output Summary (Last 24 hours) at 2024 0898  Last data filed at 2024 0528  Gross per 24 hour   Intake --   Output 250 ml   Net -250 ml       Physical Exam:   Physical Exam  Constitutional:       Comments: In some distress   Cardiovascular:      Rate and Rhythm: Normal rate and regular rhythm.      Heart sounds: No murmur heard.     No friction rub. No gallop.   Pulmonary:      Effort: No respiratory distress.      Breath sounds: No stridor. No wheezing, rhonchi or rales.      Comments: Decreased breath sounds in RLL  Persistent dry cough   Abdominal:      General: Abdomen is flat. Bowel sounds are normal.      Palpations: Abdomen is soft.      Tenderness: There is no abdominal tenderness. There is no guarding or rebound.   Neurological:      Mental Status: She is alert and oriented to person, place, and time.          Additional Data:     Labs:  Results from last 7 days   Lab Units 24  0546 24  0012   WBC Thousand/uL 7.53 8.64   HEMOGLOBIN g/dL 11.1* 11.4*   HEMATOCRIT % 34.2* 35.2   PLATELETS Thousands/uL 250 265   SEGS PCT %  --  68   LYMPHO PCT %  --  17   MONO PCT %  --  11   EOS PCT %  --  3     Results from last 7 days   Lab Units 24  0730 24  0436 24  155    SODIUM mmol/L 128*   < > 126*   POTASSIUM mmol/L 4.4   < > 4.5   CHLORIDE mmol/L 96   < > 93*   CO2 mmol/L 25   < > 23   BUN mg/dL 39*   < > 50*   CREATININE mg/dL 1.11   < > 1.64*   ANION GAP mmol/L 7   < > 10   CALCIUM mg/dL 9.0   < > 9.0   ALBUMIN g/dL  --   --  3.8   TOTAL BILIRUBIN mg/dL  --   --  0.90   ALK PHOS U/L  --   --  71   ALT U/L  --   --  52   AST U/L  --   --  46*   GLUCOSE RANDOM mg/dL 97   < > 153*    < > = values in this interval not displayed.         Results from last 7 days   Lab Units 07/27/24  0724 07/27/24  0042 07/26/24  2055 07/26/24  1622 07/26/24  1110 07/26/24  0736 07/25/24  2109 07/25/24  1819 07/25/24  1601 07/25/24  1117 07/25/24  0737 07/24/24 2057   POC GLUCOSE mg/dl 82 138 127 169* 169* 124 182* 112 145* 189* 112 140         Results from last 7 days   Lab Units 07/27/24  0546 07/27/24  0012   PROCALCITONIN ng/ml 0.11 0.13       Lines/Drains:  Invasive Devices       Peripheral Intravenous Line  Duration             Peripheral IV 07/24/24 Right Antecubital 2 days    Peripheral IV 07/27/24 Right;Ventral (anterior) Forearm <1 day                          Imaging: Reviewed radiology reports from this admission including: chest xray, CT head, and CTA Head/Neck  CT stroke alert brain   Final Result by Angelica Pimentel MD (07/27 0217)      CT Brain: No acute intracranial hemorrhage. There is an approximately 3.3 x 1.6 cm extra-axial meningioma overlying the lateral left frontal lobe. There is mild mass effect on the underlying brain. There is no definite CT evidence of edema within the    underlying brain. Moderate microangiopathy appears similar to July 13, 2024. MRI with diffusion-weighted imaging is recommended for further evaluation, if there are no contraindications.         CT Angiography: Mild atherosclerosis, as described above. No evidence of hemodynamically significant stenosis. No evidence of large vessel occlusion.      Abnormal appearance of the right lobe of the  thyroid, as described above. Please see discussion. Thyroid ultrasound is recommended for further evaluation.      Large right pleural effusion. Small left pleural effusion. Atelectasis and edema bilateral upper lungs, right greater than left.      This examination demonstrates findings for which imaging follow-up is recommended and was logged as such in EPIC.               I personally discussed this study with SUZIE BEEBE on 7/27/2024 2:03 AM.                  Workstation performed: [id number]         CTA stroke alert (head/neck)   Final Result by Angelica Pimentel MD (07/27 0217)      CT Brain: No acute intracranial hemorrhage. There is an approximately 3.3 x 1.6 cm extra-axial meningioma overlying the lateral left frontal lobe. There is mild mass effect on the underlying brain. There is no definite CT evidence of edema within the    underlying brain. Moderate microangiopathy appears similar to July 13, 2024. MRI with diffusion-weighted imaging is recommended for further evaluation, if there are no contraindications.         CT Angiography: Mild atherosclerosis, as described above. No evidence of hemodynamically significant stenosis. No evidence of large vessel occlusion.      Abnormal appearance of the right lobe of the thyroid, as described above. Please see discussion. Thyroid ultrasound is recommended for further evaluation.      Large right pleural effusion. Small left pleural effusion. Atelectasis and edema bilateral upper lungs, right greater than left.      This examination demonstrates findings for which imaging follow-up is recommended and was logged as such in EPIC.               I personally discussed this study with SUZIE BEEBE on 7/27/2024 2:03 AM.                  Workstation performed: [id number]         XR chest portable   Final Result by Bertha Richter MD (07/27 6191)      Persistent moderate right pleural effusion and right base atelectasis.      Mild pulmonary venous  congestion.            Workstation performed: [id number]         XR chest 1 view portable   ED Interpretation by Odalis Bronson MD (07/24 1835)   Worsening right plural effusion, mild pulm vasc congestion      Final Result by Bertha Richter MD (07/24 2035)      Mild pulmonary venous congestion with slight increase in moderate right effusion.      Mild subsegmental atelectasis in the right midlung.            Workstation performed: [id number]         MRI brain w wo contrast    (Results Pending)        Recent Cultures (last 7 days):         Last 24 Hours Medication List:   Current Facility-Administered Medications   Medication Dose Route Frequency Provider Last Rate    acetaminophen  650 mg Oral Q6H PRN Pauly Balderas DO      apixaban  5 mg Oral BID Yenny Spencer MD      ascorbic acid  500 mg Oral Daily Yenny Spencer MD      dextromethorphan-guaiFENesin  10 mL Oral Q4H PRN Sean Foster MD      diltiazem  120 mg Oral Daily Yenny Spencer MD      ezetimibe  10 mg Oral HS Yenny Spencer MD      flecainide  150 mg Oral BID JENNY Sapp      gabapentin  300 mg Oral HS Yenny Spencer MD      guaiFENesin  1,200 mg Oral Q12H JOAQUINA Sean Foster MD      insulin glargine  10 Units Subcutaneous HS Yenny Spencer MD      insulin lispro  1-6 Units Subcutaneous 4x Daily (PC & HS) Yenny Spencer MD      ipratropium  0.5 mg Nebulization 4x Daily Sean Foster MD      lidocaine  1 patch Topical Daily Yenny Spencer MD      loratadine  10 mg Oral Daily Yenny Spencer MD      metoprolol succinate  75 mg Oral Q12H Yenny Spencer MD      polyethylene glycol  17 g Oral Daily Yenny Spencer MD      rOPINIRole  2 mg Oral HS Sean Foster MD      senna-docusate sodium  1 tablet Oral HS Yenny Spencer MD      torsemide  10 mg Oral Daily Jenaro Stephens MD      zolpidem  10 mg Oral HS PRN Yenny Spencer MD          Today, Patient Was Seen By: Holly Louise MD    **Please Note: This note may have been constructed using  a voice recognition system.**

## 2024-07-27 NOTE — ASSESSMENT & PLAN NOTE
Increased fatigue, weakness, poor oral intake since last hospitalization  Presyncope episode day of admission  Pt's Lasix dose increased from 20 to 40 mg daily since last admission   Suspect multifactorial, related to overdiuresis, poor oral intake, KIMBERLEY    Plan:   Fatigue improved following gentle fluids  PT/OT consult  Orthostatic vitals  Cardiac interrogation  Consider geriatric consult

## 2024-07-27 NOTE — SEDATION DOCUMENTATION
Right sided thoracentesis performed by Dr. Stephens.  1000 mL Clear yellow fluid removed. No labs needed as per orders.   Band-aid in place.  Report given to Lynette Lancaster.

## 2024-07-27 NOTE — RAPID RESPONSE
Rapid Response Note  Farnaz Martin 79 y.o. female MRN: [id number]  Unit/Bed#: S -01 Encounter: [id number]    Rapid Response Notification(s):   Response called date/time:  7/27/2024 12:45 AM  Response team arrival date/time:  7/27/2024 12:47 AM  Response end date/time:  7/27/2024 1:00 AM  Level of care:  Medr  Rapid response location:  [facility] unit  Primary reason for rapid response call:  Acute change in neuro status    Rapid Response Intervention(s):   Airway:  None  Breathing:  None  Circulation:  None  Fluids administered:  None  Medications administered:  None       Assessment/Plan:   We were called to bedside due to concerns for altered mental status in this patient.  Per patient's resident, she is more confused and somnolent than yesterday.  Also reports some questionable slurring of speech compared to yesterday.  Per assessment, the patient has 2 through 12 cranial nerves grossly intact.  Some questionable weakness of the bilateral lower extremities, however it is symmetric.  Good  strength, 5 out of 5.  Patient is sleepy, however patient is received sleeping medications today, and answers questions appropriately when roused.     Rapid Response Outcome:   Transfer:  Remain on floor  Code Status: Level 1 (Full Code)      Family notified: No, patient declined Family notification     Background/Situation:   Farnaz Martin is a 79 y.o. female who was admitted for KIMBERLEY, for whom a rapid was called due to concern for AMS/stroke.    Review of Systems   HENT:  Negative for facial swelling and rhinorrhea.    Eyes:  Negative for discharge and visual disturbance.   Respiratory:  Positive for cough. Negative for shortness of breath.    Cardiovascular:  Negative for chest pain and leg swelling.   Gastrointestinal:  Negative for abdominal distention and abdominal pain.   Skin:  Negative for rash.   Neurological:  Negative for facial asymmetry, speech difficulty, light-headedness, numbness and headaches.    Psychiatric/Behavioral:  Negative for agitation and confusion.        Objective:   Vitals:    07/26/24 1955 07/26/24 2223 07/27/24 0004 07/27/24 0029   BP: 127/66 110/57  123/75   BP Location:  Left arm     Pulse: 70 (!) 54 73 73   Resp:  18     Temp:  98.9 °F (37.2 °C) 98.1 °F (36.7 °C)    TempSrc:  Oral     SpO2: 95% 100% 92% 93%   Weight:       Height:         Physical Exam  Vitals and nursing note reviewed.   Constitutional:       Comments: Patient is very drowsy at the time of exam.   HENT:      Head: Normocephalic and atraumatic.      Right Ear: External ear normal.      Left Ear: External ear normal.      Nose: Nose normal.      Mouth/Throat:      Mouth: Mucous membranes are moist.      Pharynx: Oropharynx is clear.   Eyes:      Extraocular Movements: Extraocular movements intact.      Conjunctiva/sclera: Conjunctivae normal.   Cardiovascular:      Rate and Rhythm: Regular rhythm. Tachycardia present.   Pulmonary:      Effort: Pulmonary effort is normal.      Breath sounds: Normal breath sounds.   Abdominal:      General: Abdomen is flat.   Musculoskeletal:         General: Normal range of motion.      Cervical back: Normal range of motion.   Skin:     General: Skin is warm and dry.   Neurological:      Mental Status: She is oriented to person, place, and time.      GCS: GCS eye subscore is 4. GCS verbal subscore is 5. GCS motor subscore is 6.      Cranial Nerves: Cranial nerves 2-12 are intact. No cranial nerve deficit or facial asymmetry.      Motor: No pronator drift.

## 2024-07-27 NOTE — ASSESSMENT & PLAN NOTE
Wt Readings from Last 3 Encounters:   07/26/24 73.3 kg (161 lb 9.6 oz)   07/22/24 72.5 kg (159 lb 12.8 oz)   07/17/24 72.1 kg (159 lb)       BNP elevated at 1,428 on admission    Plan:  Strict I/Os  Daily weights  Cardiology, nephrology consulted  Rest of plan as KIMBERLEY

## 2024-07-27 NOTE — RESPIRATORY THERAPY NOTE
RT Protocol Note  Farnaz Martin 79 y.o. female MRN: [id number]  Unit/Bed#: S -01 Encounter: [id number]    Assessment    Principal Problem:    Acute kidney injury superimposed on CKD (chronic kidney disease) stage 3, GFR 30-59 ml/min ([facility])  Active Problems:    Restless legs syndrome    Acute on chronic heart failure (HCC)    Cough    Hyponatremia    Paroxysmal A-fib ([facility])    Recurrent pleural effusion on right    Fatigue    Stroke-like symptoms    Thyroid nodule      Home Pulmonary Medications:  Spiriva, Albuterol MDI       Past Medical History:   Diagnosis Date    Actinic keratosis     last assessed - 06Jun2014    Arthritis     Atrial fibrillation with rapid ventricular response (HCC)     last assessed - 26Apr2016    Atrial fibrillation with rapid ventricular response ([facility]) 04/19/2016    Basal cell carcinoma     Benign colon polyp     last assessed - 27Apr2015    Bronchiectasis without complication ([facility])     CHF (congestive heart failure) ([facility]) 06/2022    Chronic diastolic CHF (congestive heart failure) ([facility])     Disease of thyroid gland     Effusion of knee joint right     Resolved - 19Apr2016    Esophageal reflux     Fibromyalgia     last assessed - [id number]    Fibromyalgia, primary     GERD (gastroesophageal reflux disease)     Hyperlipidemia     Hypertension     Hyponatremia     Malignant neoplasm of thyroid gland (HCC)     Meningioma (HCC)     MGUS (monoclonal gammopathy of unknown significance)     Palpitations     last assessed - 30Apr2013    Peroneal tendonitis, right     last assessed - [id number]    Right lumbar radiculopathy 03/17/2016    Thyroid cancer (HCC)     Thyroid nodule     Trochanteric bursitis of left hip 03/09/2018     Social History     Socioeconomic History    Marital status:      Spouse name: None    Number of children: None    Years of education: None    Highest education level: None   Occupational History    None   Tobacco Use    Smoking status: Never    Smokeless tobacco:  "Never   Vaping Use    Vaping status: Never Used   Substance and Sexual Activity    Alcohol use: Not Currently    Drug use: Never    Sexual activity: Not Currently   Other Topics Concern    None   Social History Narrative    ** Merged History Encounter **          Social Determinants of Health     Financial Resource Strain: Patient Unable To Answer (12/19/2023)    Overall Financial Resource Strain (CARDIA)     Difficulty of Paying Living Expenses: Patient unable to answer   Food Insecurity: No Food Insecurity (7/25/2024)    Hunger Vital Sign     Worried About Running Out of Food in the Last Year: Never true     Ran Out of Food in the Last Year: Never true   Transportation Needs: No Transportation Needs (7/25/2024)    PRAPARE - Transportation     Lack of Transportation (Medical): No     Lack of Transportation (Non-Medical): No   Physical Activity: Not on file   Stress: Not on file   Social Connections: Not on file   Intimate Partner Violence: Not At Risk (7/31/2023)    [facility]    Humiliation, Afraid, Rape, and Kick questionnaire     Fear of Current or Ex-Partner: No     Emotionally Abused: No     Physically Abused: No     Sexually Abused: No   Housing Stability: Low Risk  (7/25/2024)    Housing Stability Vital Sign     Unable to Pay for Housing in the Last Year: No     Number of Times Moved in the Last Year: 0     Homeless in the Last Year: No       Subjective         Objective    Physical Exam:   Assessment Type: Pre-treatment  General Appearance: Awake, Alert  Respiratory Pattern: Normal  Chest Assessment: Chest expansion symmetrical  Bilateral Breath Sounds: Diminished  Cough: Non-productive  O2 Device: None    Vitals:  Blood pressure 121/77, pulse 85, temperature 97.8 °F (36.6 °C), resp. rate 20, height 5' 2\" (1.575 m), weight 73.3 kg (161 lb 9.6 oz), last menstrual period 02/01/1990, SpO2 98%, not currently breastfeeding.          Imaging and other " studies: I have personally reviewed pertinent reports.      O2 Device: None     Plan    Respiratory Plan: Home Bronchodilator Patient pathway, No distress/Pulmonary history

## 2024-07-27 NOTE — ASSESSMENT & PLAN NOTE
Pt has hx bronchiectasis since 2/2024  Carking cough since yesterday. Pt states similar to past episodes since bronchiectasis dx  Pt also endorsing orthopnea, SOB  Uses Spiriva inhaler and Robitussin at home for sx's  R/p chest x-ray shows increased congestion. Procalc neg  Cough component on bronchiectasis vs CHF in setting of holding Lasix     Plan:  Muxinex 1200 mg Q12 hrs scheduled  Robitussin 10 mL Q4 hrs prn  Atrovent neb four times daily  Reached out to cardiology to see about Lasix x1 today  Per cardio, trialing Torsemide 10 mg today

## 2024-07-27 NOTE — ASSESSMENT & PLAN NOTE
POA1.64; (baseline .9-1.1)   Increased weakness, fatigue since last hospital admission  Dry on exam during admission  Pt taking higher Lasix dose since last admission; increased from 20 mg daily to 40 mg  Suspect KIMBERLEY secondary to overdiuresis, poor oral intake    Plan:  Kidney function improving after gentle IV fluids. Continue to encourage po intake  Trial diuretics today after Lasix held  Decrease outpatient Lasix dose, per cardio  Follow kidney function w/ BMP   Avoid hypoperfusion of the kidneys, minimize nephrotoxins  Consider nephrology consultation if worsening KIMBERLEY

## 2024-07-27 NOTE — INCIDENTAL FINDINGS
The following findings require follow up:  Radiographic finding  Finding: Abnormal appearance of the right lobe of the thyroid   Right lobe of the thyroid is enlarged and heterogeneous and appears to contain several somewhat complex appearing nodules. The largest nodule is hypodense and measures 2.4 cm. Incidental discovery of one or more thyroid nodule(s) demonstrating suspicious CT imaging features; according to guidelines published in the February 2015 white paper on incidental thyroid nodules in the Journal of the American College of Radiology (JACR), further characterization with thyroid ultrasound is recommended.   Follow up required: Thyroid Ultrasound   Follow up should be done within 6 months    Please notify the following clinician to assist with the follow up:   Dr. Naveen Monsivais

## 2024-07-27 NOTE — QUICK NOTE
Stroke Alert Note   Farnaz Matrin 79 y.o. female  MRN: [id number]   Unit/Bed#: S -01 Encounter: [id number]     Stroke alert text received at: 12:48am  Call from  received at: ***  Neurology response ***    79F eliquis on afib, admitted for KIMBERLEY and hyponatremia.  Agitation tonight. Last known well at 10pm.  Currently only oriented to self and year but thinks she's at home.  Needed a posey to keep in bed    Defecits in B/L upper visual fields  Difficulty tracking  B/L leg weakness (?effort)  B/L pronator drift  Speech kind of muffled/slurred    NIHSSS 10 (1 point for field loss, 1 point for each arm, 3 points for each leg, 1 point for mild dysarthria)      CT head wo: ***  CTA head/neck: ***    Thrombolytic decision: {Thrombolytic decision:94185}   ***IV TNK was not given due to ***  - Admit on stroke pathway  - Recommend loading with aspirin 325mg once, then continuing 81 mg daily, and loading with plavix 300mg once, followed by 75mg daily.  - start lipitor 40mg Qday  - MRI brain wo, echo, lipid panel, HbA1c  - permissive HTN to 220/110 for 24 hours, monitor on telemetry  - normothermia, euglycemia  - avoid hypotonic fluids.  - frequent neurochecks.  Obtain STAT repeat CT head and notify neurology for any significant exam changes  - both pharmacologic and nonpharmacologic DVT PPx recommended.  - PT/OT/SLP    Thrombolytic decision: {Thrombolytic decision:61450}   ***Recommend administering IV TNK, once confirmed to have no contraindications, to treat disabling symptoms thought to be due to stroke within the first 4.5 hours after last known normal.  - Post TNK vitals and neurocheck protocol  - admit on stroke pathway to ICU/critical care for 24 hours  - repeat head CT 24 hours after administering TNK  - no AP or AC prior to above-mentioned repeat head CT  - start lipitor   - MRI brain wo, echo, HbA1c, lipid panel  - monitor on telemetry  - normothermia, euglycemia  - avoid hypotonic fluids.  - frequent  neurochecks.  Obtain STAT repeat CT head and notify neurology for any significant exam changes  - only non-pharmacologic DVT PPx with SCDs for 24hrs after TNK.  Start pharmacologic DVT PPx at 24hrs if CT head 24hrs after TNK shows no evidence of hemorrhagic transformation.  - PT/OT/SLP      Iftikhar Vasquez MD

## 2024-07-27 NOTE — ASSESSMENT & PLAN NOTE
Patient with history of chronic hyponatremia, presents on admission with sodium of 128.  Baseline Sodium between 128-134  Serum osm 292; U osm 511; Kylie 11  Lipids low HDL  Glucose 153  Cortisol 20 wnl    Plan:   Trend BMP

## 2024-07-27 NOTE — DISCHARGE SUMMARY
"[facility]  Discharge- Farnaz Martin 1945, 79 y.o. female MRN: [id number]  Unit/Bed#: S -01 Encounter: [id number]  Primary Care Provider: Naveen Monsivais MD   Date and time admitted to hospital: 7/24/2024  4:12 PM    Acute on chronic heart failure (HCC)  Assessment & Plan  Wt Readings from Last 3 Encounters:   07/24/24 71.7 kg (158 lb)   07/22/24 72.5 kg (159 lb 12.8 oz)   07/17/24 72.1 kg (159 lb)       Acute on chronic diastolic (congestive) heart failure (POA) in the setting of KIMBERLEY with chronic failure as evidenced by BNP of 1428, fluid overload, JOHNSON being treated with IV Lasix with daily weights, I&O's and monitoring of her respiratory status             Medical Problems       Resolved Problems  Date Reviewed: 7/29/2024            Resolved    Stroke-like symptoms 7/29/2024     Resolved by  Steve Tripathi MD        Discharging Resident: Steve Tripathi MD  Discharging Attending: Travis Silva MD  PCP: Naveen Monsivais MD  Admission Date:   Admission Orders (From admission, onward)       Ordered        07/24/24 1726  INPATIENT ADMISSION  Once                          Discharge Date: 07/29/24    Consultations During Hospital Stay:  Cardiology, neurology, pulmonology, interventional radiology, hospice, physical therapy, occupational therapy    Procedures Performed:   Ct head/neck, ct brain, cxr, ultrasound,     Significant Findings / Test Results:   Pleural edema  CT brain: \"3.3 x 1.6 cm extra-axial meningioma overlying the lateral left frontal lobe. There is mild mass effect on the underlying brain\"    Test Results Pending at Discharge (will require follow up):  none     Outpatient Tests Requested:  BMP in 1 week while at rehab    Complications:  none    Reason for Admission: syncope and fatigue    Hospital Course:   Hospital Course: 78 y.o. female with a PMH of congestive heart failure with preserved ejection, right-sided pleural effusion, restless leg syndrome, A-fib on " Eliquis, and chronic hyponatremia who presents with generalized fatigue, near syncope (w/out fall).     Pt reported feeling good until a few days prior to admission. She endorsed fatigue, decreased appetite, lightheadedness, sob with exertion or climbing stairs. Patient recently discharged on 7/17/2024 for syncope and collapse. On the day of admission, daughter endorsed that patient had a presyncopal episode where her son-in-law caught her before she landed on the floor. Medication adjustments notable for increase in Lasix from 20 mg to 40 mg daily.    In ED, vitals stable. Cr elevated 1.64 from baseline 0.9-1.1. Na 128 on admission, baseline hyponatremia (Na 128-130). BNP elevated at 1428. Repeated chest x-ray showed slight increase in right-sided pleural effusion and complete resolution of the left-sided pleural effusion with decrease atelectasis of the left. Also showed mild pulmonary vascular congesion. Pt was admitted to [facility] for KIMBERLEY.    Cardiology was consulted, found pt's weakness and fatigue to be likely multifactorial, possibly related to overdiuresis and acute kidney injury. Diuretics were held, pt given fluids and Cr levels began to improve. Pt did begin to endorse cough, orthopnea, abdominal fullness while diuresis held, and cardiology rec trial of Torsemide 10 mg daily.    Pt experienced an episode of disorientation, slurred speech during one night of her admission. Stroke alert was called for concerns of acute stroke. CT showed left frontal meningioma with mass shift. Neuro consulted   78 y.o. female with a PMH of congestive heart failure with preserved ejection, right-sided pleural effusion, restless leg syndrome, A-fib on Eliquis, and chronic hyponatremia who presents with generalized fatigue, near syncope (w/out fall).     Pt reported feeling good until a few days prior to admission. She endorsed fatigue, decreased appetite, lightheadedness, sob with exertion or climbing stairs. Patient recently  discharged on 7/17/2024 for syncope and collapse. On the day of admission, daughter endorsed that patient had a presyncopal episode where her son-in-law caught her before she landed on the floor. Medication adjustments notable for increase in Lasix from 20 mg to 40 mg daily.    In ED, vitals stable. Cr elevated 1.64 from baseline 0.9-1.1. Na 128 on admission, baseline hyponatremia (Na 128-130). BNP elevated at 1428. Repeated chest x-ray showed slight increase in right-sided pleural effusion and complete resolution of the left-sided pleural effusion with decrease atelectasis of the left. Also showed mild pulmonary vascular congesion. Pt was admitted to [facility] for KIMBERLEY.    Cardiology was consulted, found pt's weakness and fatigue to be likely multifactorial, possibly related to overdiuresis and acute kidney injury. Diuretics were held, pt given fluids and Cr levels began to improve. Pt did begin to endorse cough, orthopnea, abdominal fullness while diuresis held, and cardiology rec trial of Torsemide 10 mg daily.    Pt experienced an episode of disorientation, slurred speech during one night of her admission. Stroke alert was called for concerns of acute stroke. CT showed left frontal meningioma with mass shift. Neuro consulted.    Pt eventually returned to baseline mentation status, however her daughter deemed that she was too weak to function at home independently. The choice was made to go to rehab in which case management found placement rather quickly.  Pt was medically cleared for discharge at that point.      Please see above list of diagnoses and related plan for additional information.     Condition at Discharge: stable    Discharge Day Visit / Exam:   Subjective:  Evaluated patient at bedside, no acute events overnight.  Upon evaluation, she was receiving nebulizer treatment and was noted good response.  She denies any shortness of breath or wheezing, as well as no chest pain or dizziness/lightheadedness.  She  "reports being able to walk around the room with no loss of balance or dizziness, as well as voiding and stooling appropriately.  She also reports no vision changes or blurriness.    Vitals: Blood Pressure: 109/58 (07/29/24 1148)  Pulse: 73 (07/29/24 1148)  Temperature: 97.7 °F (36.5 °C) (07/29/24 1145)  Temp Source: Oral (07/29/24 0645)  Respirations: 16 (07/29/24 1148)  Height: 5' 2\" (157.5 cm) (07/24/24 2215)  Weight - Scale: 72 kg (158 lb 12.8 oz) (07/29/24 0600)  SpO2: 94 % (07/29/24 1148)  Exam:   Physical Exam  Vitals and nursing note reviewed.   Constitutional:       General: She is not in acute distress.     Appearance: She is well-developed.   HENT:      Head: Normocephalic and atraumatic.   Eyes:      Conjunctiva/sclera: Conjunctivae normal.   Cardiovascular:      Rate and Rhythm: Normal rate and regular rhythm.      Heart sounds: No murmur heard.  Pulmonary:      Effort: Pulmonary effort is normal. No respiratory distress.      Breath sounds: Normal breath sounds.   Abdominal:      Palpations: Abdomen is soft.      Tenderness: There is no abdominal tenderness.   Musculoskeletal:         General: No swelling.      Cervical back: Neck supple.   Skin:     General: Skin is warm and dry.      Capillary Refill: Capillary refill takes less than 2 seconds.   Neurological:      Mental Status: She is alert.   Psychiatric:         Mood and Affect: Mood normal.          Discussion with Family: Updated  (daughter) at bedside.    Discharge instructions/Information to patient and family:   See after visit summary for information provided to patient and family.      Provisions for Follow-Up Care:  See after visit summary for information related to follow-up care and any pertinent home health orders.      Mobility at time of Discharge:   Basic Mobility Inpatient Raw Score: 19  JH-HLM Goal: 6: Walk 10 steps or more  JH-HLM Achieved: 7: Walk 25 feet or more  HLM Goal achieved. Continue to encourage appropriate " mobility.     Disposition:   Assisted Living Facility at [facility]    Planned Readmission: no    Discharge Medications:  See after visit summary for reconciled discharge medications provided to patient and/or family.      **Please Note: This note may have been constructed using a voice recognition system**

## 2024-07-27 NOTE — ASSESSMENT & PLAN NOTE
Stroke alert called after pt desat to 86%, presented with altered mental status and slurred speech last night  CT Head 7/27/2024: No acute intracranial hemorrhage. There is an approximately 3.3 x 1.6 cm extra-axial meningioma overlying the lateral left frontal lobe. There is mild mass effect on the underlying brain    Plan:  Altered mental status likely 2/2 delirium. Pt has hx delirium in hospital and is AAOx4 this morning  Neurology consulted  F/up MRI brain

## 2024-07-27 NOTE — ASSESSMENT & PLAN NOTE
Hx right-sided pleural effusion in the setting of bronchiectasis and pulmonary hypertension  Underwent thoracentesis on 7/15/2024 with fluid suggested of transudate by effusion.    Chest x-ray on 7/22/2024 showed new small right-sided pleural effusion.    Chest x-ray on admission appears to show worsening right-sided pleural effusion   Patient reports taking Lasix 40 mg twice daily after discharge from the hospital.   Suspect recurrent pleural effusion secondary to heart failure.  R/p CXR shows persistent right pleural effusion, increased congestion    Plan:  Consider r/p thoracentesis if symptomatic and doesn't resolve on own

## 2024-07-27 NOTE — PROGRESS NOTES
Cardiology Progress Note - Farnaz Martin 79 y.o. female MRN: [id number]    Unit/Bed#: S -01 Encounter: [id number]      Assessment/Recommendations:  1.  Weakness and fatigue: Multifactorial possibly related to overdiuresis and acute kidney injury.  2.  Acute kidney injury: Possibly related to overdiuresis.  Definite improvement in electrolytes and creatinine with gentle IV fluids and holding Lasix.    3.  Acute on chronic hyponatremia: Significantly reduced, possibly related to overdiuresis.  Follow while holding Lasix-stable overnight.  4.  Recurrent right-sided pleural effusion: Continues to have some effusion, which may require repeat thoracentesis if does not resolve on its own.  Follow-up for worsening as far as symptoms and hypoxia.  Currently maintaining O2 sats on room air.  5.  Chronic heart failure preserved ejection fraction: Patient was thought to be taking higher dose of Lasix at twice a day 40 mg dosing as opposed to prior to last admission when she was only on 20 mg daily.  On further questioning, patient's daughter reports that she has been taking 40 mg daily.  Currently off diuretics and status post gentle IV fluids due to KIMBERLEY in the setting of overdiuresis.  Follow strict I's and O's and daily weights.  Will trial torsemide instead of Lasix at 10 mg daily starting today.  6.  Paroxysmal atrial flutter/fibrillation: Status post ablation.  Continued on flecainide and beta-blocker along with Cardizem as an outpatient, with Eliquis as anticoagulation.  Continue current regiment.  7.  Hypertension: Well-controlled, if not slightly low.  Continued on beta-blocker, calcium channel blocker, and will resume diuretic today.  8.  Hyperlipidemia: Continued on Zetia.  9.  Bronchiectasis: With chronic cough that is unchanged from prior visit.  10.  Left renal mass: Continue outpatient surveillance.  11.  Type 2 diabetes mellitus: Management as per primary team.     Subjective:   Patient seen and  "examined.  No significant events overnight.  ; pertinent negatives - chest pain, chest pressure/discomfort, dyspnea, irregular heart beat, and palpitations.    Objective:     Vitals: Blood pressure 121/77, pulse 84, temperature 97.8 °F (36.6 °C), resp. rate 18, height 5' 2\" (1.575 m), weight 73.3 kg (161 lb 9.6 oz), last menstrual period 02/01/1990, SpO2 100%, not currently breastfeeding., Body mass index is 29.56 kg/m².,   Orthostatic Blood Pressures      Flowsheet Row Most Recent Value   Blood Pressure 121/77 filed at 07/27/2024 0708   Patient Position - Orthostatic VS Lying filed at 07/26/2024 2223              Intake/Output Summary (Last 24 hours) at 7/27/2024 0829  Last data filed at 7/27/2024 0528  Gross per 24 hour   Intake --   Output 250 ml   Net -250 ml       TELE: No significant arrhythmias seen.    Physical Exam:    GEN: Farnaz Martin appears well, alert and oriented x 3, pleasant and cooperative   HEENT: pupils equal, round, and reactive to light; extraocular muscles intact  NECK: supple, no carotid bruits   HEART: regular rhythm, normal S1 and S2, + systolic murmur, no clicks, gallops or rubs   LUNGS: clear to auscultation bilaterally; no wheezes, rales, or rhonchi   ABDOMEN: normal bowel sounds, soft, no tenderness, no distention  EXTREMITIES: peripheral pulses normal; no clubbing, cyanosis, or edema  NEURO: no focal findings   SKIN: normal without suspicious lesions on exposed skin    Medications:      Current Facility-Administered Medications:     acetaminophen (TYLENOL) tablet 650 mg, 650 mg, Oral, Q6H PRN, Pauly Balderas DO, 650 mg at 07/25/24 2347    apixaban (ELIQUIS) tablet 5 mg, 5 mg, Oral, BID, Yenny Spencer MD, 5 mg at 07/26/24 1800    ascorbic acid (VITAMIN C) tablet 500 mg, 500 mg, Oral, Daily, Yenny Spencer MD, 500 mg at 07/26/24 0811    dextromethorphan-guaiFENesin (ROBITUSSIN DM) oral syrup 10 mL, 10 mL, Oral, Q4H PRN, Sean Foster MD    diltiazem (CARDIZEM CD) 24 hr " capsule 120 mg, 120 mg, Oral, Daily, Yenny Spencer MD, 120 mg at 07/26/24 0811    ezetimibe (ZETIA) tablet 10 mg, 10 mg, Oral, HS, Yenny Spencer MD, 10 mg at 07/26/24 2205    flecainide (TAMBOCOR) tablet 150 mg, 150 mg, Oral, BID, JENNY Sapp, 150 mg at 07/26/24 1800    gabapentin (NEURONTIN) capsule 300 mg, 300 mg, Oral, HS, Yenny Spencer MD, 300 mg at 07/26/24 2205    guaiFENesin (MUCINEX) 12 hr tablet 1,200 mg, 1,200 mg, Oral, Q12H JOAQUINA, Sean Foster MD    insulin glargine (LANTUS) subcutaneous injection 10 Units 0.1 mL, 10 Units, Subcutaneous, HS, Yenny Spencer MD, 10 Units at 07/26/24 2205    insulin lispro (HumALOG/ADMELOG) 100 units/mL subcutaneous injection 1-6 Units, 1-6 Units, Subcutaneous, 4x Daily (PC & HS), 1 Units at 07/26/24 1800 **AND** Fingerstick Glucose (POCT), , , 4x Daily AC and at bedtime, Yenny Spencer MD    ipratropium (ATROVENT) 0.02 % inhalation solution 0.5 mg, 0.5 mg, Nebulization, 4x Daily, Sean Foster MD    lidocaine (LIDODERM) 5 % patch 1 patch, 1 patch, Topical, Daily, Yenny Spencer MD, 1 patch at 07/26/24 0810    loratadine (CLARITIN) tablet 10 mg, 10 mg, Oral, Daily, Yenny Spencer MD, 10 mg at 07/26/24 0811    metoprolol succinate (TOPROL-XL) 24 hr tablet 75 mg, 75 mg, Oral, Q12H, Yenny Spencer MD, 75 mg at 07/26/24 2013    polyethylene glycol (MIRALAX) packet 17 g, 17 g, Oral, Daily, Yenny Spencer MD, 17 g at 07/26/24 0810    rOPINIRole (REQUIP) tablet 2 mg, 2 mg, Oral, HS, Sean Foster MD, 2 mg at 07/26/24 2205    senna-docusate sodium (SENOKOT S) 8.6-50 mg per tablet 1 tablet, 1 tablet, Oral, HS, Yenny Spencer MD    zolpidem (AMBIEN) tablet 10 mg, 10 mg, Oral, HS PRN, Yenny Spencer MD, 10 mg at 07/26/24 2205     Labs & Results:        Results from last 7 days   Lab Units 07/27/24  0546 07/27/24  0012 07/26/24  0520   WBC Thousand/uL 7.53 8.64 8.57   HEMOGLOBIN g/dL 11.1* 11.4* 11.5   HEMATOCRIT % 34.2* 35.2 35.0   PLATELETS Thousands/uL 250 265 283      Results from last 7 days   Lab Units 07/25/24  0653   TRIGLYCERIDES mg/dL 55   HDL mg/dL 28*     Results from last 7 days   Lab Units 07/27/24  0730 07/27/24  0241 07/27/24  0012 07/25/24  0436 07/24/24  1551   POTASSIUM mmol/L 4.4 4.4 4.5   < > 4.5   CHLORIDE mmol/L 96 96 97   < > 93*   CO2 mmol/L 25 22 22   < > 23   BUN mg/dL 39* 42* 44*   < > 50*   CREATININE mg/dL 1.11 1.11 1.17   < > 1.64*   CALCIUM mg/dL 9.0 8.8 8.8   < > 9.0   ALK PHOS U/L  --   --   --   --  71   ALT U/L  --   --   --   --  52   AST U/L  --   --   --   --  46*    < > = values in this interval not displayed.         Results from last 7 days   Lab Units 07/25/24  0436 07/24/24  1045   MAGNESIUM mg/dL 2.4 2.3       Echo: personally reviewed -ejection fraction 50% with mild global hypokinesis.  Dilated RV, left atrial enlargement.  Mild mitral regurgitation.  Moderate tricuspid regurgitation with moderate pulmonary hypertension.    EKG personally reviewed by Jenaro Stephens MD.

## 2024-07-27 NOTE — HOSPITAL COURSE
78 y.o. female with a PMH of congestive heart failure with preserved ejection, right-sided pleural effusion, restless leg syndrome, A-fib on Eliquis, and chronic hyponatremia who presents with generalized fatigue, near syncope (w/out fall).     Pt reported feeling good until a few days prior to admission. She endorsed fatigue, decreased appetite, lightheadedness, sob with exertion or climbing stairs. Patient recently discharged on 7/17/2024 for syncope and collapse. On the day of admission, daughter endorsed that patient had a presyncopal episode where her son-in-law caught her before she landed on the floor. Medication adjustments notable for increase in Lasix from 20 mg to 40 mg daily.    In ED, vitals stable. Cr elevated 1.64 from baseline 0.9-1.1. Na 128 on admission, baseline hyponatremia (Na 128-130). BNP elevated at 1428. Repeated chest x-ray showed slight increase in right-sided pleural effusion and complete resolution of the left-sided pleural effusion with decrease atelectasis of the left. Also showed mild pulmonary vascular congesion. Pt was admitted to [facility] for KIMBERLEY.    Cardiology was consulted, found pt's weakness and fatigue to be likely multifactorial, possibly related to overdiuresis and acute kidney injury. Diuretics were held, pt given fluids and Cr levels began to improve. Pt did begin to endorse cough, orthopnea, abdominal fullness while diuresis held, and cardiology rec trial of Torsemide 10 mg daily.    Pt experienced an episode of disorientation, slurred speech during one night of her admission. Stroke alert was called for concerns of acute stroke. CT showed left frontal meningioma with mass shift. Neuro consulted

## 2024-07-28 LAB
ANION GAP SERPL CALCULATED.3IONS-SCNC: 6 MMOL/L (ref 4–13)
BASOPHILS # BLD AUTO: 0.05 THOUSANDS/ÂΜL (ref 0–0.1)
BASOPHILS NFR BLD AUTO: 1 % (ref 0–1)
BUN SERPL-MCNC: 37 MG/DL (ref 5–25)
CALCIUM SERPL-MCNC: 8.9 MG/DL (ref 8.4–10.2)
CHLORIDE SERPL-SCNC: 95 MMOL/L (ref 96–108)
CO2 SERPL-SCNC: 27 MMOL/L (ref 21–32)
CREAT SERPL-MCNC: 1.29 MG/DL (ref 0.6–1.3)
EOSINOPHIL # BLD AUTO: 0.12 THOUSAND/ÂΜL (ref 0–0.61)
EOSINOPHIL NFR BLD AUTO: 2 % (ref 0–6)
ERYTHROCYTE [DISTWIDTH] IN BLOOD BY AUTOMATED COUNT: 13.9 % (ref 11.6–15.1)
GFR SERPL CREATININE-BSD FRML MDRD: 39 ML/MIN/1.73SQ M
GLUCOSE SERPL-MCNC: 137 MG/DL (ref 65–140)
GLUCOSE SERPL-MCNC: 176 MG/DL (ref 65–140)
GLUCOSE SERPL-MCNC: 70 MG/DL (ref 65–140)
GLUCOSE SERPL-MCNC: 82 MG/DL (ref 65–140)
GLUCOSE SERPL-MCNC: 89 MG/DL (ref 65–140)
HCT VFR BLD AUTO: 36 % (ref 34.8–46.1)
HGB BLD-MCNC: 11.4 G/DL (ref 11.5–15.4)
IMM GRANULOCYTES # BLD AUTO: 0.03 THOUSAND/UL (ref 0–0.2)
IMM GRANULOCYTES NFR BLD AUTO: 1 % (ref 0–2)
LYMPHOCYTES # BLD AUTO: 1.1 THOUSANDS/ÂΜL (ref 0.6–4.47)
LYMPHOCYTES NFR BLD AUTO: 18 % (ref 14–44)
MCH RBC QN AUTO: 29.3 PG (ref 26.8–34.3)
MCHC RBC AUTO-ENTMCNC: 31.7 G/DL (ref 31.4–37.4)
MCV RBC AUTO: 93 FL (ref 82–98)
MONOCYTES # BLD AUTO: 0.84 THOUSAND/ÂΜL (ref 0.17–1.22)
MONOCYTES NFR BLD AUTO: 14 % (ref 4–12)
NEUTROPHILS # BLD AUTO: 3.95 THOUSANDS/ÂΜL (ref 1.85–7.62)
NEUTS SEG NFR BLD AUTO: 64 % (ref 43–75)
NRBC BLD AUTO-RTO: 0 /100 WBCS
PLATELET # BLD AUTO: 260 THOUSANDS/UL (ref 149–390)
PMV BLD AUTO: 10.1 FL (ref 8.9–12.7)
POTASSIUM SERPL-SCNC: 4.3 MMOL/L (ref 3.5–5.3)
RBC # BLD AUTO: 3.89 MILLION/UL (ref 3.81–5.12)
SODIUM SERPL-SCNC: 128 MMOL/L (ref 135–147)
WBC # BLD AUTO: 6.09 THOUSAND/UL (ref 4.31–10.16)

## 2024-07-28 PROCEDURE — 99232 SBSQ HOSP IP/OBS MODERATE 35: CPT | Performed by: INTERNAL MEDICINE

## 2024-07-28 PROCEDURE — 85025 COMPLETE CBC W/AUTO DIFF WBC: CPT

## 2024-07-28 PROCEDURE — 94640 AIRWAY INHALATION TREATMENT: CPT

## 2024-07-28 PROCEDURE — 94760 N-INVAS EAR/PLS OXIMETRY 1: CPT

## 2024-07-28 PROCEDURE — 80048 BASIC METABOLIC PNL TOTAL CA: CPT

## 2024-07-28 PROCEDURE — 82948 REAGENT STRIP/BLOOD GLUCOSE: CPT

## 2024-07-28 RX ADMIN — FLECAINIDE ACETATE 150 MG: 50 TABLET ORAL at 17:23

## 2024-07-28 RX ADMIN — APIXABAN 5 MG: 5 TABLET, FILM COATED ORAL at 08:23

## 2024-07-28 RX ADMIN — ROPINIROLE HYDROCHLORIDE 2 MG: 1 TABLET, FILM COATED ORAL at 21:32

## 2024-07-28 RX ADMIN — ZOLPIDEM TARTRATE 10 MG: 5 TABLET, COATED ORAL at 21:32

## 2024-07-28 RX ADMIN — GUAIFENESIN 1200 MG: 600 TABLET ORAL at 19:41

## 2024-07-28 RX ADMIN — INSULIN GLARGINE 10 UNITS: 100 INJECTION, SOLUTION SUBCUTANEOUS at 21:32

## 2024-07-28 RX ADMIN — DILTIAZEM HYDROCHLORIDE 120 MG: 120 CAPSULE, COATED, EXTENDED RELEASE ORAL at 08:23

## 2024-07-28 RX ADMIN — INSULIN LISPRO 1 UNITS: 100 INJECTION, SOLUTION INTRAVENOUS; SUBCUTANEOUS at 21:34

## 2024-07-28 RX ADMIN — METOPROLOL SUCCINATE 75 MG: 50 TABLET, EXTENDED RELEASE ORAL at 08:23

## 2024-07-28 RX ADMIN — GUAIFENESIN 1200 MG: 600 TABLET ORAL at 08:22

## 2024-07-28 RX ADMIN — LORATADINE 10 MG: 10 TABLET ORAL at 08:22

## 2024-07-28 RX ADMIN — GUAIFENESIN AND DEXTROMETHORPHAN 10 ML: 100; 10 SYRUP ORAL at 19:41

## 2024-07-28 RX ADMIN — OXYCODONE HYDROCHLORIDE AND ACETAMINOPHEN 500 MG: 500 TABLET ORAL at 08:22

## 2024-07-28 RX ADMIN — ACETAMINOPHEN 650 MG: 325 TABLET, FILM COATED ORAL at 15:36

## 2024-07-28 RX ADMIN — TORSEMIDE 10 MG: 10 TABLET ORAL at 08:22

## 2024-07-28 RX ADMIN — IPRATROPIUM BROMIDE 0.5 MG: 0.5 SOLUTION RESPIRATORY (INHALATION) at 19:43

## 2024-07-28 RX ADMIN — ACETAMINOPHEN 650 MG: 325 TABLET, FILM COATED ORAL at 21:33

## 2024-07-28 RX ADMIN — EZETIMIBE 10 MG: 10 TABLET ORAL at 21:32

## 2024-07-28 RX ADMIN — APIXABAN 5 MG: 5 TABLET, FILM COATED ORAL at 17:23

## 2024-07-28 RX ADMIN — GABAPENTIN 300 MG: 300 CAPSULE ORAL at 21:33

## 2024-07-28 RX ADMIN — FLECAINIDE ACETATE 150 MG: 50 TABLET ORAL at 08:22

## 2024-07-28 RX ADMIN — IPRATROPIUM BROMIDE 0.5 MG: 0.5 SOLUTION RESPIRATORY (INHALATION) at 07:27

## 2024-07-28 NOTE — PLAN OF CARE
Problem: PAIN - ADULT  Goal: Verbalizes/displays adequate comfort level or baseline comfort level  Description: Interventions:  - Encourage patient to monitor pain and request assistance  - Assess pain using appropriate pain scale  - Administer analgesics based on type and severity of pain and evaluate response  - Implement non-pharmacological measures as appropriate and evaluate response  - Consider cultural and social influences on pain and pain management  - Notify physician/advanced practitioner if interventions unsuccessful or patient reports new pain  Outcome: Progressing     Problem: INFECTION - ADULT  Goal: Absence or prevention of progression during hospitalization  Description: INTERVENTIONS:  - Assess and monitor for signs and symptoms of infection  - Monitor lab/diagnostic results  - Monitor all insertion sites, i.e. indwelling lines, tubes, and drains  - Monitor endotracheal if appropriate and nasal secretions for changes in amount and color  - Independence appropriate cooling/warming therapies per order  - Administer medications as ordered  - Instruct and encourage patient and family to use good hand hygiene technique  - Identify and instruct in appropriate isolation precautions for identified infection/condition  Outcome: Progressing  Goal: Absence of fever/infection during neutropenic period  Description: INTERVENTIONS:  - Monitor WBC    Outcome: Progressing     Problem: SAFETY ADULT  Goal: Patient will remain free of falls  Description: INTERVENTIONS:  - Educate patient/family on patient safety including physical limitations  - Instruct patient to call for assistance with activity   - Consult OT/PT to assist with strengthening/mobility   - Keep Call bell within reach  - Keep bed low and locked with side rails adjusted as appropriate  - Keep care items and personal belongings within reach  - Initiate and maintain comfort rounds  - Make Fall Risk Sign visible to staff  - Apply yellow socks and bracelet  for high fall risk patients  - Consider moving patient to room near nurses station  Outcome: Progressing  Goal: Maintain or return to baseline ADL function  Description: INTERVENTIONS:  -  Assess patient's ability to carry out ADLs; assess patient's baseline for ADL function and identify physical deficits which impact ability to perform ADLs (bathing, care of mouth/teeth, toileting, grooming, dressing, etc.)  - Assess/evaluate cause of self-care deficits   - Assess range of motion  - Assess patient's mobility; develop plan if impaired  - Assess patient's need for assistive devices and provide as appropriate  - Encourage maximum independence but intervene and supervise when necessary  - Involve family in performance of ADLs  - Assess for home care needs following discharge   - Consider OT consult to assist with ADL evaluation and planning for discharge  - Provide patient education as appropriate  Outcome: Progressing  Goal: Maintains/Returns to pre admission functional level  Description: INTERVENTIONS:  - Perform AM-PAC 6 Click Basic Mobility/ Daily Activity assessment daily.  - Set and communicate daily mobility goal to care team and patient/family/caregiver.   - Collaborate with rehabilitation services on mobility goals if consulted  - Out of bed for toileting  - Record patient progress and toleration of activity level   Outcome: Progressing     Problem: DISCHARGE PLANNING  Goal: Discharge to home or other facility with appropriate resources  Description: INTERVENTIONS:  - Identify barriers to discharge w/patient and caregiver  - Arrange for needed discharge resources and transportation as appropriate  - Identify discharge learning needs (meds, wound care, etc.)  - Arrange for interpretive services to assist at discharge as needed  - Refer to Case Management Department for coordinating discharge planning if the patient needs post-hospital services based on physician/advanced practitioner order or complex needs  related to functional status, cognitive ability, or social support system  Outcome: Progressing     Problem: Knowledge Deficit  Goal: Patient/family/caregiver demonstrates understanding of disease process, treatment plan, medications, and discharge instructions  Description: Complete learning assessment and assess knowledge base.  Interventions:  - Provide teaching at level of understanding  - Provide teaching via preferred learning methods  Outcome: Progressing

## 2024-07-28 NOTE — ASSESSMENT & PLAN NOTE
Wt Readings from Last 3 Encounters:   07/28/24 71.9 kg (158 lb 8.2 oz)   07/22/24 72.5 kg (159 lb 12.8 oz)   07/17/24 72.1 kg (159 lb)       BNP elevated at 1,428 on admission    Plan:  Strict I/Os  Daily weights  Cardiology, nephrology consulted  Rest of plan as KIMBERLEY

## 2024-07-28 NOTE — ASSESSMENT & PLAN NOTE
Lab Results   Component Value Date    EGFR 39 07/28/2024    EGFR 47 07/27/2024    EGFR 47 07/27/2024    CREATININE 1.29 07/28/2024    CREATININE 1.11 07/27/2024    CREATININE 1.11 07/27/2024

## 2024-07-28 NOTE — ASSESSMENT & PLAN NOTE
Pt has hx bronchiectasis since 2/2024  Carking cough since yesterday. Pt states similar to past episodes since bronchiectasis dx  Pt also endorsing orthopnea, SOB  Uses Spiriva inhaler and Robitussin at home for sx's  R/p chest x-ray shows increased congestion. Procalc neg  Cough component on bronchiectasis vs CHF in setting of holding Lasix     Plan:  Muxinex 1200 mg Q12 hrs scheduled  Robitussin 10 mL Q4 hrs prn  Atrovent neb four times daily

## 2024-07-28 NOTE — ASSESSMENT & PLAN NOTE
Recent Labs     07/27/24  0241 07/27/24  0730 07/28/24  0506   SODIUM 125* 128* 128*     Lab Results   Component Value Date    OSMOUA 511 07/25/2024    NAUR 11 07/25/2024    OSMOLALITSER 293 07/25/2024     Patient with history of chronic hyponatremia, presents on admission with sodium of 128.  Baseline Sodium between 128-134  Serum osm 292; U osm 511; Kylie 11  Lipids low HDL  Glucose 153  Cortisol 20 wnl    Plan:   Sodium at baseline Trend BMP

## 2024-07-28 NOTE — PROGRESS NOTES
Cardiology Progress Note - Farnaz Martin 79 y.o. female MRN: [id number]    Unit/Bed#: S -01 Encounter: [id number]      Assessment/Recommendations:  1.  Weakness and fatigue: Multifactorial possibly related to overdiuresis and acute kidney injury.  2.  Acute kidney injury: Possibly related to overdiuresis.  Definite improvement in electrolytes and creatinine with gentle IV fluids and holding Lasix - slight increase overnight with starting on low dose PO diuretic with torsemide.    3.  Acute on chronic hyponatremia: Significantly reduced, possibly related to overdiuresis.  Follow while holding Lasix- remains stable overnight.  4.  Recurrent right-sided pleural effusion: Continues to have some effusion, which may require repeat thoracentesis if does not resolve on its own.  Follow-up for worsening as far as symptoms and hypoxia.  Currently maintaining O2 sats on room air.  Started on low dose torsemide.  5.  Chronic heart failure preserved ejection fraction: Patient was thought to be taking higher dose of Lasix at twice a day 40 mg dosing as opposed to prior to last admission when she was only on 20 mg daily.  On further questioning, patient's daughter reports that she has been taking 40 mg daily.  Status post gentle IV fluids due to KIMBERLEY in the setting of overdiuresis.  Follow strict I's and O's and daily weights.  Will trial torsemide instead of Lasix at 10 mg daily starting yesterday.  6.  Paroxysmal atrial flutter/fibrillation: Status post ablation.  Continued on flecainide and beta-blocker along with Cardizem as an outpatient, with Eliquis as anticoagulation.  Continue current regiment.  7.  Hypertension: Well-controlled, if not slightly low.  Continued on beta-blocker, calcium channel blocker, and resumed on diuretic.  8.  Hyperlipidemia: Continued on Zetia.  9.  Bronchiectasis: With chronic cough that is unchanged from prior hospitalization.  10.  Left renal mass: Continue outpatient surveillance.  11.   "Type 2 diabetes mellitus: Management as per primary team.   12.  Recheck Cr in AM along with oxygenation overnight.  If seems to be continuing to do well on current regimen, then can likely be discharged tomorrow with close outpatient follow-up.    Subjective:   Patient seen and examined.  No significant events overnight.  ; pertinent negatives - chest pain, chest pressure/discomfort, dyspnea, irregular heart beat, and palpitations.    Objective:     Vitals: Blood pressure 124/64, pulse 86, temperature (!) 97 °F (36.1 °C), resp. rate 18, height 5' 2\" (1.575 m), weight 71.9 kg (158 lb 8.2 oz), last menstrual period 02/01/1990, SpO2 97%, not currently breastfeeding., Body mass index is 28.99 kg/m².,   Orthostatic Blood Pressures      Flowsheet Row Most Recent Value   Blood Pressure 124/64 filed at 07/28/2024 0732   Patient Position - Orthostatic VS Lying filed at 07/26/2024 2223              Intake/Output Summary (Last 24 hours) at 7/28/2024 0859  Last data filed at 7/27/2024 2009  Gross per 24 hour   Intake --   Output 1500 ml   Net -1500 ml       Physical Exam:    GEN: Farnaz Martin appears well, alert and oriented x 3, pleasant and cooperative   HEENT: pupils equal, round, and reactive to light; extraocular muscles intact  NECK: supple, no carotid bruits   HEART: regular rhythm, normal S1 and S2, +systolic murmur, no clicks, gallops or rubs   LUNGS: clear to auscultation bilaterally; no wheezes, rales, or rhonchi   ABDOMEN: normal bowel sounds, soft, no tenderness, no distention  EXTREMITIES: peripheral pulses normal; no clubbing, cyanosis, or edema  NEURO: no focal findings   SKIN: normal without suspicious lesions on exposed skin      Medications:      Current Facility-Administered Medications:     acetaminophen (TYLENOL) tablet 650 mg, 650 mg, Oral, Q6H PRN, Pauly Balderas DO, 650 mg at 07/27/24 1808    albuterol (PROVENTIL HFA,VENTOLIN HFA) inhaler 2 puff, 2 puff, Inhalation, Q4H PRN, Travis" MD Shira    apixaban (ELIQUIS) tablet 5 mg, 5 mg, Oral, BID, Yenny Spencer MD, 5 mg at 07/28/24 0823    ascorbic acid (VITAMIN C) tablet 500 mg, 500 mg, Oral, Daily, Yenny Spencer MD, 500 mg at 07/28/24 0822    dextromethorphan-guaiFENesin (ROBITUSSIN DM) oral syrup 10 mL, 10 mL, Oral, Q4H PRN, Sean Foster MD, 10 mL at 07/27/24 2105    diltiazem (CARDIZEM CD) 24 hr capsule 120 mg, 120 mg, Oral, Daily, Yenny Spencer MD, 120 mg at 07/28/24 0823    ezetimibe (ZETIA) tablet 10 mg, 10 mg, Oral, HS, Yenny Spencer MD, 10 mg at 07/27/24 2103    flecainide (TAMBOCOR) tablet 150 mg, 150 mg, Oral, BID, JENNY Sapp, 150 mg at 07/28/24 0822    gabapentin (NEURONTIN) capsule 300 mg, 300 mg, Oral, HS, Yenny Spencer MD, 300 mg at 07/27/24 2102    guaiFENesin (MUCINEX) 12 hr tablet 1,200 mg, 1,200 mg, Oral, Q12H JOAQUINA, Sean Foster MD, 1,200 mg at 07/28/24 0822    insulin glargine (LANTUS) subcutaneous injection 10 Units 0.1 mL, 10 Units, Subcutaneous, HS, Yenny Spencer MD, 10 Units at 07/27/24 2102    insulin lispro (HumALOG/ADMELOG) 100 units/mL subcutaneous injection 1-6 Units, 1-6 Units, Subcutaneous, 4x Daily (PC & HS), 1 Units at 07/27/24 2114 **AND** Fingerstick Glucose (POCT), , , 4x Daily AC and at bedtime, Yenny Spencer MD    ipratropium (ATROVENT) 0.02 % inhalation solution 0.5 mg, 0.5 mg, Nebulization, TID, Travis Silva MD, 0.5 mg at 07/28/24 0727    lidocaine (LIDODERM) 5 % patch 1 patch, 1 patch, Topical, Daily, Yenny Spencer MD, 1 patch at 07/27/24 0929    loratadine (CLARITIN) tablet 10 mg, 10 mg, Oral, Daily, Yenny Spencer MD, 10 mg at 07/28/24 0822    metoprolol succinate (TOPROL-XL) 24 hr tablet 75 mg, 75 mg, Oral, Q12H, Yenny Spencer MD, 75 mg at 07/28/24 0823    polyethylene glycol (MIRALAX) packet 17 g, 17 g, Oral, Daily, Yenny Spencer MD, 17 g at 07/26/24 0810    rOPINIRole (REQUIP) tablet 2 mg, 2 mg, Oral, HS, Sean Foster MD, 2 mg at 07/27/24 2103    senna-docusate sodium  (SENOKOT S) 8.6-50 mg per tablet 1 tablet, 1 tablet, Oral, HS, Yenny Spencer MD    torsemide (DEMADEX) tablet 10 mg, 10 mg, Oral, Daily, Jenaro Stephens MD, 10 mg at 07/28/24 0822    zolpidem (AMBIEN) tablet 10 mg, 10 mg, Oral, HS PRN, Yenny Spencer MD, 10 mg at 07/27/24 2105     Labs & Results:        Results from last 7 days   Lab Units 07/28/24  0506 07/27/24  0546 07/27/24  0012   WBC Thousand/uL 6.09 7.53 8.64   HEMOGLOBIN g/dL 11.4* 11.1* 11.4*   HEMATOCRIT % 36.0 34.2* 35.2   PLATELETS Thousands/uL 260 250 265     Results from last 7 days   Lab Units 07/25/24  0653   TRIGLYCERIDES mg/dL 55   HDL mg/dL 28*     Results from last 7 days   Lab Units 07/28/24  0506 07/27/24  0730 07/27/24  0241 07/25/24  0436 07/24/24  1551   POTASSIUM mmol/L 4.3 4.4 4.4   < > 4.5   CHLORIDE mmol/L 95* 96 96   < > 93*   CO2 mmol/L 27 25 22   < > 23   BUN mg/dL 37* 39* 42*   < > 50*   CREATININE mg/dL 1.29 1.11 1.11   < > 1.64*   CALCIUM mg/dL 8.9 9.0 8.8   < > 9.0   ALK PHOS U/L  --   --   --   --  71   ALT U/L  --   --   --   --  52   AST U/L  --   --   --   --  46*    < > = values in this interval not displayed.         Results from last 7 days   Lab Units 07/25/24  0436 07/24/24  1045   MAGNESIUM mg/dL 2.4 2.3       Echo: personally reviewed -ejection fraction 50% with mild global hypokinesis.  Dilated RV, left atrial enlargement.  Mild mitral regurgitation.  Moderate tricuspid regurgitation with moderate pulmonary hypertension.    EKG personally reviewed by Jenaro Stephens MD.

## 2024-07-28 NOTE — ASSESSMENT & PLAN NOTE
Recent Labs     07/27/24  0241 07/27/24  0730 07/28/24  0506   CREATININE 1.11 1.11 1.29   EGFR 47 47 39     Estimated Creatinine Clearance: 32.3 mL/min (by C-G formula based on SCr of 1.29 mg/dL).    POA1.64; (baseline .9-1.1)   Increased weakness, fatigue since last hospital admission  Dry on exam during admission  Pt taking higher Lasix dose since last admission; increased from 20 mg daily to 40 mg(per cardiology patient was taking p.o. Lasix 40 mg twice daily)  Suspect KIMBERLEY secondary to overdiuresis, poor oral intake    Plan:  Creatinine improved s/p gentle IV fluid, little bump in creatinine after starting torsemide 10 mg daily cardiology is following. Continue to encourage po intake  Diuretic management per cardiology  Follow kidney function w/ BMP   Avoid hypoperfusion of the kidneys, minimize nephrotoxins  Consider nephrology consultation if worsening KIMBERLEY

## 2024-07-28 NOTE — ASSESSMENT & PLAN NOTE
Increased fatigue, weakness, poor oral intake since last hospitalization  Presyncope episode day of admission  Pt's Lasix dose increased from 20 to 40 mg daily since last admission   Suspect multifactorial, related to overdiuresis, poor oral intake, KIMBERLEY    Plan:   Fatigue improved following gentle fluids  PT/OT consult commended level 3  Orthostatic vitals  Cardiac interrogation  Consider geriatric consult if necessary

## 2024-07-28 NOTE — ASSESSMENT & PLAN NOTE
Hx right-sided pleural effusion in the setting of bronchiectasis and pulmonary hypertension  Underwent thoracentesis on 7/15/2024 with fluid suggested of transudate by effusion.    Chest x-ray on 7/22/2024 showed new small right-sided pleural effusion.    Chest x-ray on admission appears to show worsening right-sided pleural effusion   Patient reports taking Lasix 40 mg twice daily after discharge from the hospital.   Suspect recurrent pleural effusion secondary to heart failure.  R/p CXR shows persistent right pleural effusion, increased congestion    Plan:  S/p right thoracentesis with removal of 1.2 L fluid, reports improvement in breathing  Monitor respiratory status

## 2024-07-28 NOTE — PROGRESS NOTES
[facility]  Progress Note  Name: Farnaz Martin I  MRN: [id number]  Unit/Bed#: S -01 I Date of Admission: 7/24/2024   Date of Service: 7/28/2024 I Hospital Day: 4    Assessment & Plan   * Acute kidney injury superimposed on CKD (chronic kidney disease) stage 3, GFR 30-59 ml/min ([facility])  Assessment & Plan  Recent Labs     07/27/24  0241 07/27/24  0730 07/28/24  0506   CREATININE 1.11 1.11 1.29   EGFR 47 47 39     Estimated Creatinine Clearance: 32.3 mL/min (by C-G formula based on SCr of 1.29 mg/dL).    POA1.64; (baseline .9-1.1)   Increased weakness, fatigue since last hospital admission  Dry on exam during admission  Pt taking higher Lasix dose since last admission; increased from 20 mg daily to 40 mg(per cardiology patient was taking p.o. Lasix 40 mg twice daily)  Suspect KIMBERLEY secondary to overdiuresis, poor oral intake    Plan:  Creatinine improved s/p gentle IV fluid, little bump in creatinine after starting torsemide 10 mg daily cardiology is following. Continue to encourage po intake  Diuretic management per cardiology  Follow kidney function w/ BMP   Avoid hypoperfusion of the kidneys, minimize nephrotoxins  Consider nephrology consultation if worsening KIMBERLEY    Recurrent pleural effusion on right  Assessment & Plan  Hx right-sided pleural effusion in the setting of bronchiectasis and pulmonary hypertension  Underwent thoracentesis on 7/15/2024 with fluid suggested of transudate by effusion.    Chest x-ray on 7/22/2024 showed new small right-sided pleural effusion.    Chest x-ray on admission appears to show worsening right-sided pleural effusion   Patient reports taking Lasix 40 mg twice daily after discharge from the hospital.   Suspect recurrent pleural effusion secondary to heart failure.  R/p CXR shows persistent right pleural effusion, increased congestion    Plan:  S/p right thoracentesis with removal of 1.2 L fluid, reports improvement in breathing  Monitor respiratory  status    Acute on chronic heart failure (HCC)  Assessment & Plan  Wt Readings from Last 3 Encounters:   07/28/24 71.9 kg (158 lb 8.2 oz)   07/22/24 72.5 kg (159 lb 12.8 oz)   07/17/24 72.1 kg (159 lb)       BNP elevated at 1,428 on admission    Plan:  Strict I/Os  Daily weights  Cardiology, nephrology consulted  Rest of plan as KIMBERLEY    Thyroid nodule  Assessment & Plan  CT head/neck 7/27/2024: right lobe of the thyroid is enlarged and heterogeneous and appears to contain several somewhat complex appearing nodules. The largest nodule is hypodense and measures 2.4 cm. Incidental discovery of one or more thyroid   nodule(s) demonstrating suspicious     Plan:   Incidental finding  Follow-up with thyroid ultrasound outpatient    Stroke-like symptoms  Assessment & Plan  Stroke alert called after pt desat to 86%, presented with altered mental status and slurred speech last night  CT Head 7/27/2024: No acute intracranial hemorrhage. There is an approximately 3.3 x 1.6 cm extra-axial meningioma overlying the lateral left frontal lobe. There is mild mass effect on the underlying brain    Plan:  Altered mental status likely 2/2 delirium.  Neurology consulted, CTh positive for meningioma, recommended MRI brain but patient and family wants to hold off on that due to pending discussion for hospice evaluation    Fatigue  Assessment & Plan  Increased fatigue, weakness, poor oral intake since last hospitalization  Presyncope episode day of admission  Pt's Lasix dose increased from 20 to 40 mg daily since last admission   Suspect multifactorial, related to overdiuresis, poor oral intake, KIMBERLEY    Plan:   Fatigue improved following gentle fluids  PT/OT consult commended level 3  Orthostatic vitals  Cardiac interrogation  Consider geriatric consult if necessary    Paroxysmal A-fib (HCC)  Assessment & Plan  Afib-  S/p ablation in 03/2021.   EKG-Sinus rhythm with frequent Premature atrial complexes with 1st degree A-V block with frequent  ventricular-paced complexes  Low voltage QRS    Plan:  Eliquis 5 mg twice daily  Continue flecainide 150 mg twice daily and metoprolol succinate 75 mg twice daily  Continue diltiazem 120 mg daily    Hyponatremia  Assessment & Plan  Recent Labs     07/27/24  0241 07/27/24  0730 07/28/24  0506   SODIUM 125* 128* 128*     Lab Results   Component Value Date    OSMOUA 511 07/25/2024    NAUR 11 07/25/2024    OSMOLALITSER 293 07/25/2024     Patient with history of chronic hyponatremia, presents on admission with sodium of 128.  Baseline Sodium between 128-134  Serum osm 292; U osm 511; Kylie 11  Lipids low HDL  Glucose 153  Cortisol 20 wnl    Plan:   Sodium at baseline Trend BMP      Cough  Assessment & Plan  Pt has hx bronchiectasis since 2/2024  Carking cough since yesterday. Pt states similar to past episodes since bronchiectasis dx  Pt also endorsing orthopnea, SOB  Uses Spiriva inhaler and Robitussin at home for sx's  R/p chest x-ray shows increased congestion. Procalc neg  Cough component on bronchiectasis vs CHF in setting of holding Lasix     Plan:  Muxinex 1200 mg Q12 hrs scheduled  Robitussin 10 mL Q4 hrs prn  Atrovent neb four times daily    Restless legs syndrome  Assessment & Plan  Continue ropinirole             VTE Pharmacologic Prophylaxis: VTE Score: 4 Moderate Risk (Score 3-4) - Pharmacological DVT Prophylaxis Ordered: apixaban (Eliquis).    Mobility:   Basic Mobility Inpatient Raw Score: 19  JH-HLM Goal: 6: Walk 10 steps or more  JH-HLM Achieved: 6: Walk 10 steps or more  JH-HLM Goal achieved. Continue to encourage appropriate mobility.    Patient Centered Rounds: I performed bedside rounds with nursing staff today.  Discussions with Specialists or Other Care Team Provider: Cardiology    Education and Discussions with Family / Patient: Updated  (daughter) via phone.    Current Length of Stay: 4 day(s)  Current Patient Status: Inpatient   Discharge Plan: Anticipate discharge in 24-48 hrs to  discharge location TBD, pending hospice evaluation    Code Status: Level 1 - Full Code    Subjective:   I have examined the patient bedside this morning. Overnight: No significant overnight events. She is AAOx3, denies any headache, CP, SOB, abdominal pain, N/V/D/C, urinary symptoms. Reports improvement in her breathing post thoracentesis. BM and UO is normal. Pt is hemodynamical stable.      Objective:     Vitals:   Temp (24hrs), Av °F (36.7 °C), Min:97 °F (36.1 °C), Max:98.9 °F (37.2 °C)    Temp:  [97 °F (36.1 °C)-98.9 °F (37.2 °C)] 97 °F (36.1 °C)  HR:  [60-86] 86  Resp:  [16-18] 18  BP: (105-139)/(35-78) 124/64  SpO2:  [94 %-100 %] 97 %  Body mass index is 28.99 kg/m².     Input and Output Summary (last 24 hours):     Intake/Output Summary (Last 24 hours) at 2024 1131  Last data filed at 2024  Gross per 24 hour   Intake --   Output 1500 ml   Net -1500 ml       Physical Exam:   Physical Exam  Vitals and nursing note reviewed.   Constitutional:       General: She is not in acute distress.     Appearance: She is well-developed.   HENT:      Head: Normocephalic and atraumatic.   Eyes:      Conjunctiva/sclera: Conjunctivae normal.   Cardiovascular:      Rate and Rhythm: Normal rate and regular rhythm.      Heart sounds: No murmur heard.  Pulmonary:      Effort: Pulmonary effort is normal. No respiratory distress.      Breath sounds: Normal breath sounds.   Abdominal:      Palpations: Abdomen is soft.      Tenderness: There is no abdominal tenderness.   Musculoskeletal:         General: No swelling.      Cervical back: Neck supple.      Right lower leg: No edema.      Left lower leg: No edema.   Skin:     General: Skin is warm and dry.      Capillary Refill: Capillary refill takes less than 2 seconds.   Neurological:      Mental Status: She is alert.   Psychiatric:         Mood and Affect: Mood normal.          Additional Data:     Labs:  Results from last 7 days   Lab Units 24  0506   WBC  Thousand/uL 6.09   HEMOGLOBIN g/dL 11.4*   HEMATOCRIT % 36.0   PLATELETS Thousands/uL 260   SEGS PCT % 64   LYMPHO PCT % 18   MONO PCT % 14*   EOS PCT % 2     Results from last 7 days   Lab Units 07/28/24  0506 07/25/24  0436 07/24/24  1551   SODIUM mmol/L 128*   < > 126*   POTASSIUM mmol/L 4.3   < > 4.5   CHLORIDE mmol/L 95*   < > 93*   CO2 mmol/L 27   < > 23   BUN mg/dL 37*   < > 50*   CREATININE mg/dL 1.29   < > 1.64*   ANION GAP mmol/L 6   < > 10   CALCIUM mg/dL 8.9   < > 9.0   ALBUMIN g/dL  --   --  3.8   TOTAL BILIRUBIN mg/dL  --   --  0.90   ALK PHOS U/L  --   --  71   ALT U/L  --   --  52   AST U/L  --   --  46*   GLUCOSE RANDOM mg/dL 89   < > 153*    < > = values in this interval not displayed.         Results from last 7 days   Lab Units 07/28/24  0733 07/27/24  2112 07/27/24  1937 07/27/24  1755 07/27/24  1618 07/27/24  1123 07/27/24  0724 07/27/24  0042 07/26/24  2055 07/26/24  1622 07/26/24  1110 07/26/24  0736   POC GLUCOSE mg/dl 70 158* 185* 133 189* 113 82 138 127 169* 169* 124         Results from last 7 days   Lab Units 07/27/24  0546 07/27/24  0012   PROCALCITONIN ng/ml 0.11 0.13       Lines/Drains:  Invasive Devices       Peripheral Intravenous Line  Duration             Peripheral IV 07/24/24 Right Antecubital 3 days    Peripheral IV 07/27/24 Right;Ventral (anterior) Forearm 1 day                          Imaging: No pertinent imaging reviewed.    Recent Cultures (last 7 days):         Last 24 Hours Medication List:   Current Facility-Administered Medications   Medication Dose Route Frequency Provider Last Rate    acetaminophen  650 mg Oral Q6H PRN Pauly Balderas DO      albuterol  2 puff Inhalation Q4H PRN Travis Silva MD      apixaban  5 mg Oral BID Yenny Spencer MD      ascorbic acid  500 mg Oral Daily Yenny Wheagar, MD      dextromethorphan-guaiFENesin  10 mL Oral Q4H PRN Sean Foster MD      diltiazem  120 mg Oral Daily Yenny Spencer MD      ezetimibe  10 mg Oral HS Yenny  MD Johanna      flecainide  150 mg Oral BID JENNY Sapp      gabapentin  300 mg Oral HS Yenny Spencer MD      guaiFENesin  1,200 mg Oral Q12H [facility] Sean Foster MD      insulin glargine  10 Units Subcutaneous HS Yenny Spencer MD      insulin lispro  1-6 Units Subcutaneous 4x Daily (PC & HS) Yenny Spencer MD      ipratropium  0.5 mg Nebulization TID Travis Silva MD      lidocaine  1 patch Topical Daily Yenny Spencer MD      loratadine  10 mg Oral Daily Yenny Spencer MD      metoprolol succinate  75 mg Oral Q12H Yenny Spencer MD      polyethylene glycol  17 g Oral Daily Yenny Spencer MD      rOPINIRole  2 mg Oral HS Sean Foster MD      senna-docusate sodium  1 tablet Oral HS Yenny Spencer MD      torsemide  10 mg Oral Daily Jenaro Stephens MD      zolpidem  10 mg Oral HS PRN Yenny Spencer MD          Today, Patient Was Seen By: Robert Thornton MD    **Please Note: This note may have been constructed using a voice recognition system.**

## 2024-07-28 NOTE — ASSESSMENT & PLAN NOTE
Stroke alert called after pt desat to 86%, presented with altered mental status and slurred speech last night  CT Head 7/27/2024: No acute intracranial hemorrhage. There is an approximately 3.3 x 1.6 cm extra-axial meningioma overlying the lateral left frontal lobe. There is mild mass effect on the underlying brain    Plan:  Altered mental status likely 2/2 delirium.  Neurology consulted, CTh positive for meningioma, recommended MRI brain but patient and family wants to hold off on that due to pending discussion for hospice evaluation

## 2024-07-29 ENCOUNTER — HOME CARE VISIT (OUTPATIENT)
Dept: HOME HEALTH SERVICES | Facility: HOME HEALTHCARE | Age: 79
End: 2024-07-29

## 2024-07-29 VITALS
RESPIRATION RATE: 16 BRPM | HEART RATE: 73 BPM | WEIGHT: 158.8 LBS | DIASTOLIC BLOOD PRESSURE: 58 MMHG | HEIGHT: 62 IN | TEMPERATURE: 97.7 F | BODY MASS INDEX: 29.22 KG/M2 | OXYGEN SATURATION: 94 % | SYSTOLIC BLOOD PRESSURE: 109 MMHG

## 2024-07-29 DIAGNOSIS — G47.01 INSOMNIA DUE TO MEDICAL CONDITION: ICD-10-CM

## 2024-07-29 PROBLEM — R29.90 STROKE-LIKE SYMPTOMS: Status: RESOLVED | Noted: 2024-07-27 | Resolved: 2024-07-29

## 2024-07-29 LAB
ANION GAP SERPL CALCULATED.3IONS-SCNC: 4 MMOL/L (ref 4–13)
BUN SERPL-MCNC: 39 MG/DL (ref 5–25)
CALCIUM SERPL-MCNC: 8.8 MG/DL (ref 8.4–10.2)
CHLORIDE SERPL-SCNC: 97 MMOL/L (ref 96–108)
CO2 SERPL-SCNC: 30 MMOL/L (ref 21–32)
CREAT SERPL-MCNC: 1.22 MG/DL (ref 0.6–1.3)
ERYTHROCYTE [DISTWIDTH] IN BLOOD BY AUTOMATED COUNT: 14.2 % (ref 11.6–15.1)
FLUAV RNA RESP QL NAA+PROBE: NEGATIVE
FLUBV RNA RESP QL NAA+PROBE: NEGATIVE
GFR SERPL CREATININE-BSD FRML MDRD: 41 ML/MIN/1.73SQ M
GLUCOSE SERPL-MCNC: 172 MG/DL (ref 65–140)
GLUCOSE SERPL-MCNC: 68 MG/DL (ref 65–140)
GLUCOSE SERPL-MCNC: 69 MG/DL (ref 65–140)
GLUCOSE SERPL-MCNC: 74 MG/DL (ref 65–140)
HCT VFR BLD AUTO: 35.4 % (ref 34.8–46.1)
HGB BLD-MCNC: 11.1 G/DL (ref 11.5–15.4)
MCH RBC QN AUTO: 29.3 PG (ref 26.8–34.3)
MCHC RBC AUTO-ENTMCNC: 31.4 G/DL (ref 31.4–37.4)
MCV RBC AUTO: 93 FL (ref 82–98)
PLATELET # BLD AUTO: 259 THOUSANDS/UL (ref 149–390)
PMV BLD AUTO: 10 FL (ref 8.9–12.7)
POTASSIUM SERPL-SCNC: 4.1 MMOL/L (ref 3.5–5.3)
RBC # BLD AUTO: 3.79 MILLION/UL (ref 3.81–5.12)
RSV RNA RESP QL NAA+PROBE: NEGATIVE
SARS-COV-2 RNA RESP QL NAA+PROBE: NEGATIVE
SODIUM SERPL-SCNC: 131 MMOL/L (ref 135–147)
WBC # BLD AUTO: 6.86 THOUSAND/UL (ref 4.31–10.16)

## 2024-07-29 PROCEDURE — 97530 THERAPEUTIC ACTIVITIES: CPT

## 2024-07-29 PROCEDURE — 99232 SBSQ HOSP IP/OBS MODERATE 35: CPT | Performed by: INTERNAL MEDICINE

## 2024-07-29 PROCEDURE — 99239 HOSP IP/OBS DSCHRG MGMT >30: CPT | Performed by: INTERNAL MEDICINE

## 2024-07-29 PROCEDURE — 94760 N-INVAS EAR/PLS OXIMETRY 1: CPT

## 2024-07-29 PROCEDURE — 85027 COMPLETE CBC AUTOMATED: CPT

## 2024-07-29 PROCEDURE — 82948 REAGENT STRIP/BLOOD GLUCOSE: CPT

## 2024-07-29 PROCEDURE — 0241U HB NFCT DS VIR RESP RNA 4 TRGT: CPT

## 2024-07-29 PROCEDURE — 97116 GAIT TRAINING THERAPY: CPT

## 2024-07-29 PROCEDURE — 80048 BASIC METABOLIC PNL TOTAL CA: CPT

## 2024-07-29 PROCEDURE — 94640 AIRWAY INHALATION TREATMENT: CPT

## 2024-07-29 RX ORDER — FLECAINIDE ACETATE 150 MG/1
150 TABLET ORAL 2 TIMES DAILY
Qty: 30 TABLET | Refills: 0
Start: 2024-07-29

## 2024-07-29 RX ORDER — TORSEMIDE 10 MG/1
10 TABLET ORAL DAILY
Qty: 30 TABLET | Refills: 0
Start: 2024-07-30

## 2024-07-29 RX ORDER — ZOLPIDEM TARTRATE 10 MG/1
10 TABLET ORAL
Qty: 14 TABLET | Refills: 0 | Status: SHIPPED | OUTPATIENT
Start: 2024-07-29

## 2024-07-29 RX ORDER — POTASSIUM CHLORIDE 750 MG/1
10 TABLET, EXTENDED RELEASE ORAL DAILY
Status: DISCONTINUED | OUTPATIENT
Start: 2024-07-29 | End: 2024-07-29 | Stop reason: HOSPADM

## 2024-07-29 RX ADMIN — GUAIFENESIN 1200 MG: 600 TABLET ORAL at 08:18

## 2024-07-29 RX ADMIN — TORSEMIDE 10 MG: 10 TABLET ORAL at 08:18

## 2024-07-29 RX ADMIN — APIXABAN 5 MG: 5 TABLET, FILM COATED ORAL at 08:18

## 2024-07-29 RX ADMIN — ACETAMINOPHEN 650 MG: 325 TABLET, FILM COATED ORAL at 11:48

## 2024-07-29 RX ADMIN — INSULIN LISPRO 1 UNITS: 100 INJECTION, SOLUTION INTRAVENOUS; SUBCUTANEOUS at 11:51

## 2024-07-29 RX ADMIN — DILTIAZEM HYDROCHLORIDE 120 MG: 120 CAPSULE, COATED, EXTENDED RELEASE ORAL at 08:18

## 2024-07-29 RX ADMIN — OXYCODONE HYDROCHLORIDE AND ACETAMINOPHEN 500 MG: 500 TABLET ORAL at 08:18

## 2024-07-29 RX ADMIN — LORATADINE 10 MG: 10 TABLET ORAL at 08:18

## 2024-07-29 RX ADMIN — IPRATROPIUM BROMIDE 0.5 MG: 0.5 SOLUTION RESPIRATORY (INHALATION) at 07:06

## 2024-07-29 RX ADMIN — METOPROLOL SUCCINATE 75 MG: 50 TABLET, EXTENDED RELEASE ORAL at 08:18

## 2024-07-29 RX ADMIN — POTASSIUM CHLORIDE 10 MEQ: 750 TABLET, EXTENDED RELEASE ORAL at 11:48

## 2024-07-29 RX ADMIN — FLECAINIDE ACETATE 150 MG: 50 TABLET ORAL at 08:18

## 2024-07-29 NOTE — RESPIRATORY THERAPY NOTE
07/29/24 0706   Respiratory Assessment   Assessment Type During-treatment   General Appearance Alert;Awake   Respiratory Pattern Normal   Chest Assessment Chest expansion symmetrical   Resp Comments Patient recieved on 1L NC. Patient does not wear home oxygen. Will trial on RA. Patient observed walking to the bathroom on RA. Patient tolerated well at this time, will keep patient on RA at this time.   O2 Device Ra   Oxygen Therapy/Pulse Ox   O2 Device Nasal cannula   O2 Therapy Oxygen   Nasal Cannula O2 Flow Rate (L/min) 1 L/min   Calculated FIO2 (%) - Nasal Cannula 24   SpO2 100 %   SpO2 Activity At Rest   $ Pulse Oximetry Spot Check Charge Completed

## 2024-07-29 NOTE — PHYSICAL THERAPY NOTE
PHYSICAL THERAPY NOTE          Patient Name: Farnaz Martin  Today's Date: 24 1342   PT Last Visit   PT Visit Date 24   Note Type   Note Type Treatment   Pain Assessment   Pain Assessment Tool 0-10   Pain Score No Pain   Patient's Stated Pain Goal No pain   Hospital Pain Intervention(s) Repositioned;Ambulation/increased activity;Rest   Multiple Pain Sites No   Restrictions/Precautions   Weight Bearing Precautions Per Order No   Other Precautions Fall Risk   General   Chart Reviewed Yes   Response to Previous Treatment Patient with no complaints from previous session.   Family/Caregiver Present Yes  (friends)   Cognition   Overall Cognitive Status WFL   Arousal/Participation Alert;Responsive;Cooperative   Attention Within functional limits   Orientation Level Oriented X4   Memory Within functional limits   Following Commands Follows one step commands without difficulty   Comments pt ID by name and    Subjective   Subjective pt was agreeable to participate in PT intervention as pt stated no pain pre/post tx session   Bed Mobility   Supine to Sit 6  Modified independent   Additional items Assist x 1;HOB elevated;Bedrails;Increased time required   Sit to Supine 6  Modified independent   Additional items Assist x 1;HOB elevated;Bedrails;Increased time required   Additional Comments pt was able to sit EOB w/o LOB prior to initiating functional transfers with AD and w/o an AD   Transfers   Sit to Stand 5  Supervision   Additional items Assist x 1;Increased time required;Verbal cues   Stand to Sit 5  Supervision   Additional items Assist x 1;Armrests;Increased time required;Verbal cues   Stand pivot Unable to assess   Additional Comments pt completed mulitple functional transfers with and w/o an AD with /s, Vc's for hand placement and no LOB   Ambulation/Elevation   Gait pattern Decreased foot  clearance;Foward flexed;Excessively slow   Gait Assistance 5  Supervision   Additional items Assist x 1;Verbal cues   Assistive Device Rolling walker;None  (/s for ambulation with 'x1, min ax1 for ambulation w/o an 'x1)   Distance 220'x1 'x1 no AD   Stair Management Assistance Not tested   Balance   Static Sitting Good   Dynamic Sitting Fair +   Static Standing Fair   Dynamic Standing Fair   Ambulatory Fair -  (fair - with RW, poor + with no AD)   Endurance Deficit   Endurance Deficit Yes   Endurance Deficit Description limited activity tolerance and ambulation distance   Activity Tolerance   Activity Tolerance Patient limited by fatigue   Nurse Made Aware Spoke to RN   Exercises   Hip Abduction Sitting;15 reps;AROM;Bilateral   Hip Adduction Sitting;15 reps;AROM;Bilateral  (pillow squeezes)   Knee AROM Long Arc Quad Sitting;10 reps;AROM;Bilateral   Ankle Pumps Supine;20 reps;AROM;Bilateral   Marching Sitting;10 reps;AROM;Bilateral   Assessment   Prognosis Fair   Problem List Decreased strength;Decreased endurance;Impaired balance;Decreased mobility;Decreased safety awareness   Assessment pt began tx session lying supine in the bed as pt was agreeable to participate in PT intervention. pt continues to complete all bed mobility with mod I as pt was able to sit EOB, return to supine and reposition towards HOB post tx session on her own. pt was able to complete multiple functional transfers w/ and w/o an AD with /s and VC's for hand placement in order to increase safety and balance. pt increased activity tolerance and ambulation distance in todays tx session. pt ambulated 220'x1 RW with /s and 130'x1 no AD with /s to min ax1 due to 1 slight LOB. pt was able to participate in TE activities in order to strengthen LE's , increase activity tolerance and static/dynamic sittin EOB balance. psot tx pt in bed with call bell and all pt needs met. pt would benefit from continued skilled PT intervention in order  to increase activity tolerance, ambulation distance/balance.   Goals   Patient Goals to go home   STG Expiration Date 08/04/24   PT Treatment Day 1   Plan   Treatment/Interventions Functional transfer training;LE strengthening/ROM;Therapeutic exercise;Endurance training;Patient/family training;Equipment eval/education;Bed mobility;Gait training;Spoke to nursing   Progress Progressing toward goals   PT Frequency 1-2x/wk   Discharge Recommendation   Rehab Resource Intensity Level, PT No post-acute rehabilitation needs   Equipment Recommended Walker   Walker Package Recommended Wheeled walker   AM-PAC Basic Mobility Inpatient   Turning in Flat Bed Without Bedrails 4   Lying on Back to Sitting on Edge of Flat Bed Without Bedrails 4   Moving Bed to Chair 3   Standing Up From Chair Using Arms 3   Walk in Room 3   Climb 3-5 Stairs With Railing 2   Basic Mobility Inpatient Raw Score 19   Basic Mobility Standardized Score 42.48   [facility] Highest Level Of Mobility   -HL Goal 6: Walk 10 steps or more   -HLM Achieved 7: Walk 25 feet or more   Education   Education Provided Mobility training;Assistive device   Patient Demonstrates acceptance/verbal understanding   End of Consult   Patient Position at End of Consult Supine;Bed/Chair alarm activated;All needs within reach   The patient's AM-PAC Basic Mobility Inpatient Short Form Raw Score is 19. A Raw score of greater than 16 suggests the patient may benefit from discharge to home. Please also refer to the recommendation of the Physical Therapist for safe discharge planning.    Gerald Silva

## 2024-07-29 NOTE — HOSPICE NOTE
SPOKE WITH DTJESSY DESAI VIA [facility]. REVIEWED HOSPICE SEVICES AND BENEFITS PER  GUIDELINES. LOC EXPLAINED. SHE IS THINKING THAT SHE WOULD LIKE TO POSSIBLY HAVE HER MOTHER DO STR AND THEN PROGRESS TO HOSPICE. SHE INFORMED ME THAT PT IS SEEING HER TODAY. ADELA HOPKINS UPDATED. PLEASE LEEP HOSPICE UPDATED AS TO DC PLAN.

## 2024-07-29 NOTE — PLAN OF CARE
Problem: PHYSICAL THERAPY ADULT  Goal: Performs mobility at highest level of function for planned discharge setting.  See evaluation for individualized goals.  Description: Treatment/Interventions: Functional transfer training, LE strengthening/ROM, Therapeutic exercise, Endurance training, Patient/family training, Equipment eval/education, Gait training  Equipment Recommended: Walker       See flowsheet documentation for full assessment, interventions and recommendations.  Outcome: Progressing  Note: Prognosis: Fair  Problem List: Decreased strength, Decreased endurance, Impaired balance, Decreased mobility, Decreased safety awareness  Assessment: pt began tx session lying supine in the bed as pt was agreeable to participate in PT intervention. pt continues to complete all bed mobility with mod I as pt was able to sit EOB, return to supine and reposition towards HOB post tx session on her own. pt was able to complete multiple functional transfers w/ and w/o an AD with /s and VC's for hand placement in order to increase safety and balance. pt increased activity tolerance and ambulation distance in todays tx session. pt ambulated 220'x1 RW with /s and 130'x1 no AD with /s to min ax1 due to 1 slight LOB. pt was able to participate in TE activities in order to strengthen LE's , increase activity tolerance and static/dynamic sittin EOB balance. psot tx pt in bed with call bell and all pt needs met. pt would benefit from continued skilled PT intervention in order to increase activity tolerance, ambulation distance/balance.        Rehab Resource Intensity Level, PT: No post-acute rehabilitation needs    See flowsheet documentation for full assessment.

## 2024-07-29 NOTE — CASE MANAGEMENT
Case Management Discharge Planning Note    Patient name Farnaz Martin  Location S /S -01 MRN [id number]  : 1945 Date 2024       Current Admission Date: 2024  Current Admission Diagnosis:Acute kidney injury superimposed on CKD (chronic kidney disease) stage 3, GFR 30-59 ml/min ([facility])   Patient Active Problem List    Diagnosis Date Noted Date Diagnosed    Stroke-like symptoms 2024     Thyroid nodule 2024     Acute kidney injury superimposed on CKD (chronic kidney disease) stage 3, GFR 30-59 ml/min ([facility]) 2024     Fatigue 2024     Recurrent pleural effusion on right 2024     Syncope and collapse 2024     Fall 2024     Type 2 diabetes mellitus with microalbuminuria, without long-term current use of insulin ([facility]) 2024     Paroxysmal A-fib ([facility]) 2024     Ambulatory dysfunction 2024     Hyponatremia 2024     Left renal mass 2024     Cough 2024     Bronchiectasis without complication ([facility]) 2024     Multiple thyroid nodules 2024     Abnormal CT of the chest 2023     S/P revision of total knee, left 2023    Nonrheumatic mitral valve regurgitation 2023     Acute on chronic heart failure ([facility]) 2022     Meningioma ([facility]) 2022     Chronic tension-type headache, not intractable 2022     Vasomotor rhinitis 2021     MGUS (monoclonal gammopathy of unknown significance) 2021     Hurthle cell adenoma of thyroid 2021     Tremors of nervous system 2021     Hx of diplopia 2021     S/P placement of cardiac pacemaker 2021     History of sick sinus syndrome s/p PPM 2021     Current use of long term anticoagulation 2021     Malignant neoplasm of thyroid gland (HCC) 2021     Chronic heart failure with preserved ejection fraction ([facility]) 2021     Chronic rhinitis 11/10/2020     Arthritis of both acromioclavicular joints  03/06/2020     Carpal tunnel syndrome on right 11/01/2019     Osteoarthritis of shoulder      TMJ syndrome 10/18/2017     Essential hypertension 04/19/2016     Peripheral neuropathy 03/07/2016     Lumbar spondylosis 06/29/2015     Lumbar stenosis 06/29/2015     Restless legs syndrome 07/09/2014     Allergic rhinitis 04/01/2013     Osteoarthritis of knee 04/01/2013     Insomnia 01/04/2013     Osteopenia 11/26/2012     Fibromyalgia 10/16/2012     Hyperlipidemia 10/16/2012     Osteoarthrosis, hand 10/16/2012     Vitamin D deficiency 10/16/2012       LOS (days): 5  Geometric Mean LOS (GMLOS) (days): 4.4  Days to GMLOS:-0.4     OBJECTIVE:  Risk of Unplanned Readmission Score: 37.46         Current admission status: Inpatient   Preferred Pharmacy:   [id number] - [place] - [address]  [address]  [place] [address]  Phone: [phone] Fax: [phone]    Primary Care Provider: Naveen Monsivais MD    Primary Insurance: MEDICARE  Secondary Insurance: [facility]    DISCHARGE DETAILS:    Discharge planning discussed with:: Patient, daughter  Freedom of Choice: Yes  Comments - [place] of Choice: [place]  CM contacted family/caregiver?: Yes (Daughter at bedside)  Were Treatment Team discharge recommendations reviewed with patient/caregiver?: Yes  Did patient/caregiver verbalize understanding of patient care needs?: N/A- going to facility  Were patient/caregiver advised of the risks associated with not following Treatment Team discharge recommendations?: Yes    Contacts  Patient Contacts: Ynes Osei  Relationship to Patient:: Other (Comment) (Self, spouse)  Contact Method: In Person  Reason/Outcome: Continuity of Care, Emergency Contact, Referral, Discharge Planning    Requested Home Health Care         Is the patient interested in [facility] at discharge?: No         Other Referral/Resources/Interventions Provided:  Interventions: Transportation, Short Term Rehab  Referral Comments: SANDEEP notified by   [facility] liaison that she spoke with pt daughter re: hospice services and daughter would like to pursue STR. CM spoke with pt and daughter at bedside to f/u on same. CM provided STR choice list for review. Pt/daughter reported preference for [facility]. CM reviewed IMM with daughter, who is agreeable to d/c as pt is now going to STR. Daughter reported no plans to appeal and would like pt to d/c today to STR. CM provided pt with copy for her records and placed original in scan bin on unit to be uplaoded to pt chart. CM reserved [facility] via [facility] and placed call to John in admissions. As per John, they are able to accept pt today pending covid test and PASRR. CM completed PASRR and uploaded form to [employer]. CM notified [facility] resident of need for covid test. CM provided dcp update to [facility] resident w/ name of accepting facility and that they are able to accept today if pt is medically stable. As per [facility] resident, pt is medically stable for d/c. CM requested a 2:30pm [facility] transport via [facility]. CM notified facility, primary nurse, [facility] resident, pt, and daughter of transport time. CM will remain available.         Treatment Team Recommendation: Home with Home Health Care  Discharge Destination Plan:: Short Term Rehab  Transport at Discharge : Wheelchair van  Dispatcher Contacted: Yes  Number/Name of Dispatcher: [facility] [phone]  Transported by (Company and Unit #): TBD  ETA of Transport (Date): 07/29/24  ETA of Transport (Time): 1430        Accompanied by: EMS personnel     IMM Given (Date):: 07/29/24  IMM Given to:: Family  Family notified:: Ynes Garrison  ..IMM reviewed with patient and caregiver, patient and caregiver agrees with discharge determination.     Accepting Facility Name, City & State : [facility]  Receiving Facility/Agency Phone Number: [phone]  Facility/Agency Fax Number: [phone]

## 2024-07-29 NOTE — HOSPICE NOTE
Received referral, patient approved for [facility] hospice (at home or SNF). Anabel, liaison will reach out to daughter to review hospice services

## 2024-07-29 NOTE — ASSESSMENT & PLAN NOTE
Pt has hx bronchiectasis since 2/2024  Barking cough since yesterday. Pt states similar to past episodes since bronchiectasis dx  Pt also endorsing orthopnea, SOB  Uses Spiriva inhaler and Robitussin at home for sx's  R/p chest x-ray shows increased congestion. Procalc neg  Cough component on bronchiectasis vs CHF in setting of holding Lasix     Plan:  Muxinex 1200 mg Q12 hrs scheduled  Robitussin 10 mL Q4 hrs prn  Atrovent neb four times daily

## 2024-07-29 NOTE — PROGRESS NOTES
General Cardiology   Progress Note -  Team One   Farnaz Martin 79 y.o. female MRN: [id number]    Unit/Bed#: S -01 Encounter: [id number]    Assessment  1. Weakness/fatigue  -Multifactorial from over diuresis resulting in KIMBERLEY, deconditioning, poor oral intake  2. KIMBERLEY (POA)  -Baseline creatinine 0.9-1.1  -Creatinine 1.5 on admission, peaked at 1.95 on 7/25, currently 1.22.  -S/p IVF hydration, now discontinued.   3. Acute on chronic hyponatremia  -Baseline Na+ level 128-133.  -As low as 125 on 7/27.  -Na+ level 131 this a.m, up from 128 yesterday.   4. Recurrent right sided pleural effusion  -Moderate per CXR read on admission  -S/p IR guided thoracentesis on 7/27, yielding 1000 mL of clear yellow fluid.  5. Chronic HFpEF  -Appears stable/compensated.  -Outpatient diuretic: Lasix 40 mg daily   -Inpatient diuretic regimen; torsemide 10 mg daily.  -24-hour I&O balance; +155 mL.  -Weight: 158 lb per standing scale  -Dry weight: recently 158-160 lb  6. Paroxysmal atrial fibrillation/flutter and PAT  7. SSS, PPM in situ.  -Prior atrial fibrillation/flutter ablation in the past.  -ECG on admission demonstrated AV paced rhythm.  -Device interrogation 6/17/2024; AT/AF BURDEN SINCE 5/24/24-2.4%.   -On Cardizem  mg daily, flecainide 150 mg twice daily, and metoprolol succinate 75 mg twice daily.  -On Eliquis 5 mg twice daily  8. HTN  -Average /62, last recorded 113/74.  -On Cardizem CD1 120 mg daily, metoprolol succinate 75 mg twice daily, and torsemide 10 mg daily.  9. HLD  -Lipid profile 7/25/2024; cholesterol 79, triglyceride 55, HDL 28 and LDL calculated 40.  -On Zetia 10 mg daily.  10. Type 2 DM- A1c 8.8%     Plan  -Patient denies any specific cardiac or respiratory complaints this a.m.  -She feels well overall.  O2 saturations are stable on RA.  Appears euvolemic on exam.  -Renal function remains stable.  Continue torsemide 10 mg daily, add kdur 10 meq daily.   -Continue Cardizem  mg daily,  "flecainide 150 mg twice daily, and metoprolol succinate 75 mg twice daily.  -Continue apixaban 5 mg twice daily for systemic AC.  -Continue Zetia 10 mg daily.  -We will arrange follow-up with cardiology upon DC.      Subjective  Review of Systems   Constitutional: Negative for chills, fever and malaise/fatigue.   Eyes:  Negative for visual disturbance.   Cardiovascular:  Negative for chest pain, dyspnea on exertion, leg swelling, orthopnea and palpitations.   Respiratory:  Positive for cough. Negative for shortness of breath.    Gastrointestinal:  Negative for abdominal pain.   Neurological:  Negative for dizziness, headaches and light-headedness.       Objective:   Physical Exam  Vitals and nursing note reviewed.   Constitutional:       General: She is not in acute distress.     Appearance: She is not diaphoretic.   HENT:      Head: Normocephalic and atraumatic.   Eyes:      General: No scleral icterus.  Cardiovascular:      Rate and Rhythm: Normal rate and regular rhythm.      Pulses: Normal pulses.      Heart sounds: Normal heart sounds.   Pulmonary:      Effort: Pulmonary effort is normal.      Breath sounds: No wheezing or rales.   Abdominal:      Palpations: Abdomen is soft.   Musculoskeletal:      Right lower leg: No edema.      Left lower leg: No edema.   Skin:     General: Skin is warm and dry.      Capillary Refill: Capillary refill takes less than 2 seconds.   Neurological:      General: No focal deficit present.      Mental Status: She is alert and oriented to person, place, and time.   Psychiatric:         Mood and Affect: Mood normal.         Vitals: Blood pressure 113/74, pulse 81, temperature 97.6 °F (36.4 °C), temperature source Oral, resp. rate 18, height 5' 2\" (1.575 m), weight 72 kg (158 lb 12.8 oz), last menstrual period 1990, SpO2 98%, not currently breastfeeding.,     Body mass index is 29.04 kg/m².,   Systolic (24hrs), Av , Min:89 , Max:113     Diastolic (24hrs), Av, Min:44, " Max:74      Intake/Output Summary (Last 24 hours) at 7/29/2024 0915  Last data filed at 7/28/2024 2148  Gross per 24 hour   Intake 240 ml   Output 325 ml   Net -85 ml     Weight (last 2 days)       Date/Time Weight    07/29/24 0600 72 (158.8)    07/28/24 0549 71.9 (158.51)            LABORATORY RESULTS      CBC with diff:   Results from last 7 days   Lab Units 07/29/24  0522 07/28/24  0506 07/27/24  0546 07/27/24  0012 07/26/24  0520 07/25/24  0436 07/24/24  1551   WBC Thousand/uL 6.86 6.09 7.53 8.64 8.57 8.30 7.48   HEMOGLOBIN g/dL 11.1* 11.4* 11.1* 11.4* 11.5 11.9 11.8   HEMATOCRIT % 35.4 36.0 34.2* 35.2 35.0 37.2 36.6   MCV fL 93 93 92 91 90 92 92   PLATELETS Thousands/uL 259 260 250 265 283 281 282   RBC Million/uL 3.79* 3.89 3.73* 3.88 3.88 4.05 4.00   MCH pg 29.3 29.3 29.8 29.4 29.6 29.4 29.5   MCHC g/dL 31.4 31.7 32.5 32.4 32.9 32.0 32.2   RDW % 14.2 13.9 14.0 13.9 13.9 13.9 14.0   MPV fL 10.0 10.1 9.9 9.9 10.0 10.1 9.9   NRBC AUTO /100 WBCs  --  0  --  0  --  0 0       CMP:  Results from last 7 days   Lab Units 07/29/24  0522 07/28/24  0506 07/27/24  0730 07/27/24  0241 07/27/24  0012 07/26/24  2006 07/26/24  1141 07/25/24  0436 07/24/24  1551   POTASSIUM mmol/L 4.1 4.3 4.4 4.4 4.5 4.6 4.2   < > 4.5   CHLORIDE mmol/L 97 95* 96 96 97 96 96   < > 93*   CO2 mmol/L 30 27 25 22 22 23 24   < > 23   BUN mg/dL 39* 37* 39* 42* 44* 45* 48*   < > 50*   CREATININE mg/dL 1.22 1.29 1.11 1.11 1.17 1.25 1.39*   < > 1.64*   CALCIUM mg/dL 8.8 8.9 9.0 8.8 8.8 8.7 8.9   < > 9.0   AST U/L  --   --   --   --   --   --   --   --  46*   ALT U/L  --   --   --   --   --   --   --   --  52   ALK PHOS U/L  --   --   --   --   --   --   --   --  71   EGFR ml/min/1.73sq m 41 39 47 47 44 41 36   < > 29    < > = values in this interval not displayed.       BMP:  Results from last 7 days   Lab Units 07/29/24  0522 07/28/24  0506 07/27/24  0730 07/27/24  0241 07/27/24  0012 07/26/24 2006 07/26/24  1141   POTASSIUM mmol/L 4.1 4.3 4.4 4.4 4.5  4.6 4.2   CHLORIDE mmol/L 97 95* 96 96 97 96 96   CO2 mmol/L 30 27 25 22 22 23 24   BUN mg/dL 39* 37* 39* 42* 44* 45* 48*   CREATININE mg/dL 1.22 1.29 1.11 1.11 1.17 1.25 1.39*   CALCIUM mg/dL 8.8 8.9 9.0 8.8 8.8 8.7 8.9       Lab Results   Component Value Date    NTBNP 761 (H) 2021    NTBNP 2,773 (H) 2021    NTBNP 506 (H) 2019        Results from last 7 days   Lab Units 24  0436 24  1045   MAGNESIUM mg/dL 2.4 2.3             Results from last 7 days   Lab Units 24  2006   TSH 3RD GENERATON uIU/mL 3.416             Lipid Profile:   Lab Results   Component Value Date    CHOL 205 2015    CHOL 195 07/10/2014     Lab Results   Component Value Date    HDL 28 (L) 2024    HDL 46 (L) 2024    HDL 53 2023     Lab Results   Component Value Date    LDLCALC 40 2024    LDLCALC 64 2024    LDLCALC 96 2023     Lab Results   Component Value Date    TRIG 55 2024    TRIG 63 2024    TRIG 68 2023       Cardiac testing:   Results for orders placed during the hospital encounter of 21    Echo complete with contrast if indicated    [address] [address] [phone]    Transthoracic Echocardiogram  2D, M-mode, Doppler, and Color Doppler    Study date:  2021    Patient: TANISHA LEVINE  MR number: [id number]  Account number: [id number]  : 1945  Age: 75 years  Gender: Female  Status: Inpatient  Location: Bedside  Height: 62 in  Weight: 151.6 lb  BP: 126/ 94 mmHg    Indications: Atrial fibrillation    Diagnoses: I48.0 - Atrial fibrillation    Sonographer:  IGNACIO Cameron  Primary Physician:  Naveen Monsivais MD  Referring Physician:  Shantal Huff MD  Group:  [facility]  Interpreting Physician:  Kwabena Seymour MD    SUMMARY    LEFT VENTRICLE:  Systolic function was normal. Ejection fraction was estimated to be 55 %.  There were no regional  wall motion abnormalities.  Wall thickness was at the upper limits of normal.  Doppler parameters were consistent with elevated ventricular end-diastolic filling pressure.    LEFT ATRIUM:  The atrium was mildly to moderately dilated.    RIGHT ATRIUM:  The atrium was mildly dilated.    MITRAL VALVE:  There was mild stenosis.    TRICUSPID VALVE:  There was mild to moderate regurgitation.  Pulmonary artery systolic pressure was mildly increased.    HISTORY: PRIOR HISTORY: HLD, HTN, PAF, chronic CHF, thyroid nodule    PROCEDURE: The procedure was performed at the bedside. This was a routine study. The transthoracic approach was used. The study included complete 2D imaging, M-mode, complete spectral Doppler, and color Doppler. The heart rate was 101 bpm,  at the start of the study. Images were obtained from the parasternal, apical, subcostal, and suprasternal notch acoustic windows. Echocardiographic views were limited due to lung interference. This was a technically difficult study.    LEFT VENTRICLE: Size was normal. Systolic function was normal. Ejection fraction was estimated to be 55 %. There were no regional wall motion abnormalities. Wall thickness was at the upper limits of normal. DOPPLER: Transmitral flow  pattern: atrial fibrillation. Left ventricular diastolic function parameters were abnormal. Doppler parameters were consistent with elevated ventricular end-diastolic filling pressure.    RIGHT VENTRICLE: The size was normal. Systolic function was normal. Wall thickness was normal.    LEFT ATRIUM: The atrium was mildly to moderately dilated.    RIGHT ATRIUM: The atrium was mildly dilated.    MITRAL VALVE: Valve structure was normal. There was normal leaflet separation. DOPPLER: The transmitral velocity was within the normal range. There was mild stenosis. There was no significant regurgitation.    AORTIC VALVE: The valve was trileaflet. Leaflets exhibited normal thickness and normal cuspal separation.  DOPPLER: Transaortic velocity was within the normal range. There was no evidence for stenosis. There was no significant  regurgitation.    TRICUSPID VALVE: The valve structure was normal. There was normal leaflet separation. DOPPLER: The transtricuspid velocity was within the normal range. There was no evidence for stenosis. There was mild to moderate regurgitation. Pulmonary  artery systolic pressure was mildly increased.    PULMONIC VALVE: Leaflets exhibited normal thickness, no calcification, and normal cuspal separation. DOPPLER: The transpulmonic velocity was within the normal range. There was no significant regurgitation.    PERICARDIUM: There was no pericardial effusion. The pericardium was normal in appearance.    AORTA: The root exhibited normal size.    SYSTEMIC VEINS: IVC: The inferior vena cava was normal in size.    PULMONARY VEINS: DOPPLER: Doppler signals were not recordable in the pulmonary vein(s).    SYSTEM MEASUREMENT TABLES    2D  %FS: 37.15 %  Ao Diam: 2.74 cm  Ao asc: 2.72 cm  EDV(Teich): 103.17 ml  EF(Teich): 67.06 %  ESV(Teich): 33.98 ml  IVSd: 0.94 cm  LA Area: 13.2 cm2  LA Diam: 3.98 cm  LVEDV MOD A4C: 32.01 ml  LVEF MOD A4C: 63.74 %  LVESV MOD A4C: 11.61 ml  LVIDd: 4.72 cm  LVIDs: 2.96 cm  LVLd A4C: 5.9 cm  LVLs A4C: 4.79 cm  LVPWd: 0.93 cm  RA Area: 8.67 cm2  RVIDd: 3.35 cm  SV MOD A4C: 20.4 ml  SV(Teich): 69.19 ml    CW  TR MaxP.39 mmHg  TR Vmax: 2.89 m/s    MM  TAPSE: 1.51 cm    [employer] Accredited Echocardiography Laboratory    Prepared and electronically signed by    Kwabena Seymour MD  Signed 2021 11:05:34    No results found for this or any previous visit.    No results found for this or any previous visit.    No valid procedures specified.  Results for orders placed during the hospital encounter of 10/05/22    NM myocardial perfusion spect (stress and/or rest)    Interpretation Summary    Resting ECG: ECG is abnormal. Resting ECG shows no ST-segment  deviation. Patient a paced and V sensed.    Perfusion Defect Conclusion: The stress/rest perfusion ratio is 1.04 . There is no evidence of transient ischemic dilation (TID).    Perfusion: There is a left ventricular perfusion defect that is small in size with mild reduction in uptake present in the mid to apical anterior and anteroseptal location(s) that is predominantly fixed. The defect appears to be an artifact caused by breast attenuation.    Stress Function: Left ventricular function post-stress is normal. Post-stress ejection fraction is 70 %.    Negative exercise pharmacological stress test      Meds/Allergies   all current active meds have been reviewed and current meds:   Current Facility-Administered Medications   Medication Dose Route Frequency    acetaminophen (TYLENOL) tablet 650 mg  650 mg Oral Q6H PRN    albuterol (PROVENTIL HFA,VENTOLIN HFA) inhaler 2 puff  2 puff Inhalation Q4H PRN    apixaban (ELIQUIS) tablet 5 mg  5 mg Oral BID    ascorbic acid (VITAMIN C) tablet 500 mg  500 mg Oral Daily    dextromethorphan-guaiFENesin (ROBITUSSIN DM) oral syrup 10 mL  10 mL Oral Q4H PRN    diltiazem (CARDIZEM CD) 24 hr capsule 120 mg  120 mg Oral Daily    ezetimibe (ZETIA) tablet 10 mg  10 mg Oral HS    flecainide (TAMBOCOR) tablet 150 mg  150 mg Oral BID    gabapentin (NEURONTIN) capsule 300 mg  300 mg Oral HS    guaiFENesin (MUCINEX) 12 hr tablet 1,200 mg  1,200 mg Oral Q12H JOAQUINA    insulin glargine (LANTUS) subcutaneous injection 10 Units 0.1 mL  10 Units Subcutaneous HS    insulin lispro (HumALOG/ADMELOG) 100 units/mL subcutaneous injection 1-6 Units  1-6 Units Subcutaneous 4x Daily (PC & HS)    ipratropium (ATROVENT) 0.02 % inhalation solution 0.5 mg  0.5 mg Nebulization TID    lidocaine (LIDODERM) 5 % patch 1 patch  1 patch Topical Daily    loratadine (CLARITIN) tablet 10 mg  10 mg Oral Daily    metoprolol succinate (TOPROL-XL) 24 hr tablet 75 mg  75 mg Oral Q12H    polyethylene glycol (MIRALAX) packet 17 g   17 g Oral Daily    rOPINIRole (REQUIP) tablet 2 mg  2 mg Oral HS    senna-docusate sodium (SENOKOT S) 8.6-50 mg per tablet 1 tablet  1 tablet Oral HS    torsemide (DEMADEX) tablet 10 mg  10 mg Oral Daily    zolpidem (AMBIEN) tablet 10 mg  10 mg Oral HS PRN            Counseling / Coordination of Care  Total floor / unit time spent today 20 minutes.  Greater than 50% of total time was spent with the patient and / or family counseling and / or coordination of care.      ** Please Note: Dragon 360 Dictation voice to text software may have been used in the creation of this document. **

## 2024-07-29 NOTE — ASSESSMENT & PLAN NOTE
Stroke alert called after pt desat to 86%, presented with altered mental status and slurred speech  CT Head 7/27/2024: No acute intracranial hemorrhage. There is an approximately 3.3 x 1.6 cm extra-axial meningioma overlying the lateral left frontal lobe. There is mild mass effect on the underlying brain    Plan:  Altered mental status likely 2/2 delirium.  Neurology consulted, CTh positive for meningioma, recommended MRI brain but patient and family wants to hold off on that due to pending discussion for hospice evaluation

## 2024-07-29 NOTE — ASSESSMENT & PLAN NOTE
Wt Readings from Last 3 Encounters:   07/28/24 71.9 kg (158 lb 8.2 oz)   07/28/24 71.9 kg (158 lb 8.2 oz)   07/22/24 72.5 kg (159 lb 12.8 oz)       BNP elevated at 1,428 on admission    Plan:  Strict I/Os  Daily weights  Cardiology, nephrology consulted  Rest of plan as KIMBERLEY

## 2024-07-29 NOTE — PLAN OF CARE
Problem: PAIN - ADULT  Goal: Verbalizes/displays adequate comfort level or baseline comfort level  Description: Interventions:  - Encourage patient to monitor pain and request assistance  - Assess pain using appropriate pain scale  - Administer analgesics based on type and severity of pain and evaluate response  - Implement non-pharmacological measures as appropriate and evaluate response  - Consider cultural and social influences on pain and pain management  - Notify physician/advanced practitioner if interventions unsuccessful or patient reports new pain  Outcome: Progressing     Problem: INFECTION - ADULT  Goal: Absence or prevention of progression during hospitalization  Description: INTERVENTIONS:  - Assess and monitor for signs and symptoms of infection  - Monitor lab/diagnostic results  - Monitor all insertion sites, i.e. indwelling lines, tubes, and drains  - Monitor endotracheal if appropriate and nasal secretions for changes in amount and color  - Pavilion appropriate cooling/warming therapies per order  - Administer medications as ordered  - Instruct and encourage patient and family to use good hand hygiene technique  - Identify and instruct in appropriate isolation precautions for identified infection/condition  Outcome: Progressing  Goal: Absence of fever/infection during neutropenic period  Description: INTERVENTIONS:  - Monitor WBC    Outcome: Progressing

## 2024-07-29 NOTE — ASSESSMENT & PLAN NOTE
Increased fatigue, weakness, poor oral intake since last hospitalization  Presyncope episode day of admission  Pt's Lasix dose increased from 20 to 40 mg daily since last admission   Suspect multifactorial, related to overdiuresis, poor oral intake, KIMBERLEY    Plan:   Fatigue improved following gentle fluids  Torsemide 10mg daily  PT/OT consult commended level 3  Orthostatic vitals  Cardiac interrogation  Consider geriatric consult if necessary

## 2024-07-29 NOTE — CASE MANAGEMENT
Case Management Discharge Planning Note    Patient name Farnaz Martin  Location S /S -01 MRN [id number]  : 1945 Date 2024       Current Admission Date: 2024  Current Admission Diagnosis:Acute kidney injury superimposed on CKD (chronic kidney disease) stage 3, GFR 30-59 ml/min ([facility])   Patient Active Problem List    Diagnosis Date Noted Date Diagnosed    Stroke-like symptoms 2024     Thyroid nodule 2024     Acute kidney injury superimposed on CKD (chronic kidney disease) stage 3, GFR 30-59 ml/min ([facility]) 2024     Fatigue 2024     Recurrent pleural effusion on right 2024     Syncope and collapse 2024     Fall 2024     Type 2 diabetes mellitus with microalbuminuria, without long-term current use of insulin ([facility]) 2024     Paroxysmal A-fib ([facility]) 2024     Ambulatory dysfunction 2024     Hyponatremia 2024     Left renal mass 2024     Cough 2024     Bronchiectasis without complication ([facility]) 2024     Multiple thyroid nodules 2024     Abnormal CT of the chest 2023     S/P revision of total knee, left 2023    Nonrheumatic mitral valve regurgitation 2023     Acute on chronic heart failure ([facility]) 2022     Meningioma ([facility]) 2022     Chronic tension-type headache, not intractable 2022     Vasomotor rhinitis 2021     MGUS (monoclonal gammopathy of unknown significance) 2021     Hurthle cell adenoma of thyroid 2021     Tremors of nervous system 2021     Hx of diplopia 2021     S/P placement of cardiac pacemaker 2021     History of sick sinus syndrome s/p PPM 2021     Current use of long term anticoagulation 2021     Malignant neoplasm of thyroid gland (HCC) 2021     Chronic heart failure with preserved ejection fraction ([facility]) 2021     Chronic rhinitis 11/10/2020     Arthritis of both acromioclavicular joints  03/06/2020     Carpal tunnel syndrome on right 11/01/2019     Osteoarthritis of shoulder      TMJ syndrome 10/18/2017     Essential hypertension 04/19/2016     Peripheral neuropathy 03/07/2016     Lumbar spondylosis 06/29/2015     Lumbar stenosis 06/29/2015     Restless legs syndrome 07/09/2014     Allergic rhinitis 04/01/2013     Osteoarthritis of knee 04/01/2013     Insomnia 01/04/2013     Osteopenia 11/26/2012     Fibromyalgia 10/16/2012     Hyperlipidemia 10/16/2012     Osteoarthrosis, hand 10/16/2012     Vitamin D deficiency 10/16/2012       LOS (days): 5  Geometric Mean LOS (GMLOS) (days): 4.4  Days to GMLOS:-0.3     OBJECTIVE:  Risk of Unplanned Readmission Score: 37.1         Current admission status: Inpatient   Preferred Pharmacy:   [id number] - [place] - [address]  [address] [facility]  [place] [address]  Phone: [phone] Fax: [phone]    Primary Care Provider: Naveen Monsivais MD    Primary Insurance: MEDICARE  Secondary Insurance: [facility]    DISCHARGE DETAILS:    Discharge planning discussed with:: Patient daughter  Freedom of Choice: Yes  Comments - Freedom of Choice: Daughter reported she is interested in hospice services for patient, interested in inpatient hospice house - aware pt will need to meet insurance guidlines for IPU  CM contacted family/caregiver?: Yes (Daughter via phone)  Were Treatment Team discharge recommendations reviewed with patient/caregiver?: Yes  Did patient/caregiver verbalize understanding of patient care needs?: Yes  Were patient/caregiver advised of the risks associated with not following Treatment Team discharge recommendations?: Yes    Contacts  Patient Contacts: Ynes  Relationship to Patient:: Family  Contact Method: Phone  Phone Number: [phone]  Reason/Outcome: Continuity of Care, Emergency Contact, Referral, Discharge Planning    Other Referral/Resources/Interventions Provided:  Interventions: Other (Specify), Hospice  Referral Comments: SANDEEP  spoke with pt daughter via phone to f/u on dcp. CM reviewed PT rec level 3, aware [facility] is able to accept referral. Daughter reported she is interested in hospice services for her mom. Daughter reported she is concerned about pt medical condition and readmissions to hospital. Daughter reported she feels as though pt cannot return home in her current condition. CM reviewed dcp options. Daughter aware if hospice benefit is elected and daughter interested in LTC placement, SNF would be PP vs MA. Daughter inquired about hospice house, aware pt will need to meet Medicare criteria. CM later met with pt and daughter at bedside to provide update. Pt/daughter aware [facility] is reviewing referral. Daughter reported she will be at bedside until around 11:30am. CM again reviewed dcp options (STR, SNF w/ hospice, home w/ HHC, home w/ hospice, home w/ PP/Waiver nonmedical home care). Daughter reported she would like to speak with hospice liaison, however would possibly be interested in STR for pt. Pt/daughter agreeable to blanket referrals for STR. CM sent referrals. CM will remain available.    Treatment Team Recommendation: Home with Home Health Care  Discharge Destination Plan:: Short Term Rehab

## 2024-07-30 ENCOUNTER — PATIENT OUTREACH (OUTPATIENT)
Dept: CASE MANAGEMENT | Facility: OTHER | Age: 79
End: 2024-07-30

## 2024-07-30 NOTE — PROGRESS NOTES
OPCM SW received ADT alert that patient was discharged from the hospital.  Per chart review, patient was discharged to [facility].  Referral placed requested SW wait to reach out to daughter until early August.  OPCM SW will plan for f/u in August

## 2024-07-30 NOTE — PROGRESS NOTES
Outpatient Care Management KIMBERLEY/SNF Pathway. Discharged 7/29/24 to [facility]. Email sent to facility to inform them the patient is on the KIMBERLEY Pathway and I will be following them during their skilled stay.  This Admin Coordinator will continue to monitor via chart review.

## 2024-07-31 ENCOUNTER — NURSING HOME VISIT (OUTPATIENT)
Dept: GERIATRICS | Facility: OTHER | Age: 79
End: 2024-07-31
Payer: MEDICARE

## 2024-07-31 DIAGNOSIS — E87.1 HYPONATREMIA: ICD-10-CM

## 2024-07-31 DIAGNOSIS — J90 RECURRENT PLEURAL EFFUSION ON RIGHT: ICD-10-CM

## 2024-07-31 DIAGNOSIS — D32.9 MENINGIOMA (HCC): ICD-10-CM

## 2024-07-31 DIAGNOSIS — N28.89 LEFT RENAL MASS: ICD-10-CM

## 2024-07-31 DIAGNOSIS — I50.32 CHRONIC HEART FAILURE WITH PRESERVED EJECTION FRACTION (HCC): Chronic | ICD-10-CM

## 2024-07-31 DIAGNOSIS — G93.40 ENCEPHALOPATHY: ICD-10-CM

## 2024-07-31 DIAGNOSIS — I48.0 PAROXYSMAL A-FIB (HCC): ICD-10-CM

## 2024-07-31 DIAGNOSIS — J47.9 BRONCHIECTASIS WITHOUT COMPLICATION (HCC): ICD-10-CM

## 2024-07-31 DIAGNOSIS — R53.81 DEBILITY: ICD-10-CM

## 2024-07-31 DIAGNOSIS — N18.31 STAGE 3A CHRONIC KIDNEY DISEASE (HCC): Primary | ICD-10-CM

## 2024-07-31 DIAGNOSIS — Z95.0 S/P PLACEMENT OF CARDIAC PACEMAKER: ICD-10-CM

## 2024-07-31 PROCEDURE — 99306 1ST NF CARE HIGH MDM 50: CPT | Performed by: FAMILY MEDICINE

## 2024-07-31 NOTE — PROGRESS NOTES
[facility]  History and Physical  POS: SNF-31    Records Reviewed include: [facility] records  Unable to obtain from patient due to: History obtained from patient in addition to medical record review    Chief Complaint/ Reason for Admission: KIMBERLEY; Acute on chronic diastolic CHF; Pleural effusion; Encephalopathy    History of Present Illness:            79 year old female admitted for SNF rehab following hospitalization at [facility]. Presented with fatigue/weakness and presyncope. Found to have acute on chronic hyponatremia and KIMBERLEY. Recent hospitalization and diuretic escalation- diuretics held and treated with IVFs inpatient; restarted on diuretic- torsemide 10mg at time of hospital discharge. Patient underwent thoracentesis for R pleural effusion. Underwent stroke workup inpatient with episode of encephalopathy- no acute abnormality noted per imaging, meningioma noted. See A&P below for additional HPI. Since SNF admission, patient with noted persistent cough; not relieved by tessalon perles. Scheduled nebulizers and PRN robitussin started- patient noted some improvement in symptoms with restful sleep overnight. Spoke with son at bedside.          Allergies   Allergen Reactions    Sotalol Other (See Comments)     Prolonged QTc leading to torsades de pointes     Penicillins Other (See Comments)     As a child calcium deposit in the arm     Ace Inhibitors GI Intolerance     Did feel well on it    Omeprazole Abdominal Pain, Rash and Vomiting     stomach pain, vomiting, rash        Past Medical History  Past Medical History:   Diagnosis Date    Actinic keratosis     last assessed - 06Jun2014    Arthritis     Atrial fibrillation with rapid ventricular response (HCC)     last assessed - 26Apr2016    Atrial fibrillation with rapid ventricular response (HCC) 04/19/2016    Basal cell carcinoma     Benign colon polyp     last assessed - 27Apr2015     Bronchiectasis without complication (HCC)     CHF (congestive heart failure) (HCC) 06/2022    Chronic diastolic CHF (congestive heart failure) (HCC)     Disease of thyroid gland     Effusion of knee joint right     Resolved - [id number]    Esophageal reflux     Fibromyalgia     last assessed - [id number]    Fibromyalgia, primary     GERD (gastroesophageal reflux disease)     Hyperlipidemia     Hypertension     Hyponatremia     Malignant neoplasm of thyroid gland (HCC)     Meningioma (HCC)     MGUS (monoclonal gammopathy of unknown significance)     Palpitations     last assessed - 30Apr2013    Peroneal tendonitis, right     last assessed - [id number]    Right lumbar radiculopathy 03/17/2016    Thyroid cancer (HCC)     Thyroid nodule     Trochanteric bursitis of left hip 03/09/2018        Past Surgical History:   Procedure Laterality Date    CARDIAC ELECTROPHYSIOLOGY STUDY AND ABLATION  08/2022    CATARACT EXTRACTION Bilateral     COLONOSCOPY  03/2018    COLONOSCOPY W/ POLYPECTOMY  2021    repeat in 5 years    EYE SURGERY      IR THORACENTESIS  7/27/2024    OOPHORECTOMY      DE NEUROPLASTY &/TRANSPOS MEDIAN NRV CARPAL TUNNE Right 11/14/2019    Procedure: RELEASE CARPAL TUNNEL;  Surgeon: Onesimo Tate MD;  Location: [facility];  Service: Orthopedics    DE TOTAL THYROID LOBECTOMY UNI W/WO ISTHMUSECTOMY Left 12/16/2020    Procedure: Left THYROID LOBECTOMY;  Surgeon: Jhony Bhatt MD;  Location: [facility];  Service: ENT    RECTAL POLYPECTOMY      REPLACEMENT TOTAL KNEE Left     last assessed - 27Apr2015    TONSILLECTOMY      TOTAL ABDOMINAL HYSTERECTOMY      TUBAL LIGATION      US GUIDED THYROID BIOPSY  10/14/2020        Family History   Problem Relation Age of Onset    Heart disease Mother     Diabetes Mother     Heart disease Father     Coronary artery disease Father     Stroke Father         cerebrovascular accident    Heart attack Father         myocardial infarction    Sudden death Father         scd    Other Family          Back disorder    Coronary artery disease Family     Neuropathy Family     Osteoporosis Family     No Known Problems Daughter     No Known Problems Maternal Grandmother     No Known Problems Maternal Grandfather     No Known Problems Paternal Grandmother     No Known Problems Paternal Grandfather     Cancer Maternal Uncle     Breast cancer Maternal Aunt 65    No Known Problems Son     No Known Problems Maternal Aunt     No Known Problems Maternal Aunt     No Known Problems Maternal Aunt     No Known Problems Paternal Aunt     No Known Problems Paternal Aunt     Anuerysm Neg Hx     Clotting disorder Neg Hx     Arrhythmia Neg Hx     Heart failure Neg Hx         Social History  Tobacco Use: Low Risk  (7/24/2024)    Patient History     Smoking Tobacco Use: Never     Smokeless Tobacco Use: Never     Passive Exposure: Not on file           Physical Exam    Vitals reviewed in SNF EMR    Physical Exam  Vitals and nursing note reviewed.   Constitutional:       General: She is awake. She is not in acute distress.     Appearance: She is well-groomed. She is not toxic-appearing or diaphoretic.   HENT:      Head: Normocephalic and atraumatic.      Nose: No rhinorrhea.      Mouth/Throat:      Mouth: Mucous membranes are moist.   Eyes:      General: No scleral icterus.        Right eye: No discharge.         Left eye: No discharge.      Conjunctiva/sclera: Conjunctivae normal.   Cardiovascular:      Rate and Rhythm: Normal rate.   Pulmonary:      Breath sounds: Examination of the right-upper field reveals wheezing. Examination of the right-middle field reveals decreased breath sounds and wheezing. Examination of the right-lower field reveals decreased breath sounds and wheezing. Decreased breath sounds and wheezing present. No rhonchi.   Abdominal:      General: There is no distension.   Musculoskeletal:      Cervical back: No rigidity.      Right lower leg: No edema.      Left lower leg: No edema.   Skin:     Coloration:  Skin is not jaundiced or pale.   Neurological:      Mental Status: She is alert.      Cranial Nerves: No dysarthria or facial asymmetry.   Psychiatric:         Attention and Perception: Attention and perception normal.         Speech: Speech normal.         Behavior: Behavior is cooperative.         Thought Content: Thought content normal.       Review of Systems:  Review of Systems   Constitutional:  Negative for chills and fever.   Respiratory:  Positive for cough. Negative for chest tightness, shortness of breath and wheezing.    Neurological:  Negative for dizziness and light-headedness.        List of Current Medications: Medication list reviewed and updated in Epic to reflect most current SNF orders    Labs/Diagnostics (reviewed by this provider): Hospital Paperwork  CBC (7/29/24) Hgb 11.1 WBC 6.86 Plt 259  BMP (7/29/24) Na 131 K 4.1 BUN 39 Creat 1.22  Procalcitonin (7/27/24) 0.11  TSH (7/26/24) 3.416  AM cortisol (7/26/24) 20.0  BNP (7/24/24) 1428    Imaging Reviewed:  IR thoracentesis- R (7/27/24) -1000ml clear yellow fluid  CT head/CTA (7/27/24) lateral L frontal lobe meningioma with mild mass effect; no acute intracranial hemorrhage  CXR (7/27/24) persistent moderate R pleural effusion with atelectasis   EKG (7/27/24)   Echo (7/14/24) EF 50%    Assessment/Plan:  79 year old female with:    1. KIMBERLEY- in setting of hypovolemia due to diuretics. S/p IVFs inpatient; torsemide 10mg daily started prior to hospital discharge. KIMBERLEY pathway. BMP ordered. Underlying CKD3    2. R pleural effusion- s/p IR thoracentesis on 7/27/24 (-1000ml). Monitor respiratory status closely.    3. Chronic heart failure with preserved EF- EF 50% per most recent Echo. Monitor daily weights/CHF pathway. Continue torsemide, metoprolol. BMP ordered    4. Hyponatremia- acute on chronic; monitor volume status closely, encourage PO intake. BMP ordered    5. PAfib/ SSS with chronic pacemaker in place- continues on diltiazem, metoprolol and  flecainide; anticoagulation with eliquis; follow up with cardiology    6. DM2- most recent A1c 8.8. Monitor accuchecks fasting + QHS, ranging 123-308 since admission on 7/30. Continue to monitor and adjust medications as indicated- may need increase in basal insulin; avoid hypoglycemia; current regimen includes lantus 10u QHS, farxiga 5mg daily.    7. CKD3- baseline creatinine 0.9-1.1    8. Encephalopathy- multifactorial in setting of above and acute illness. Management of acute and chronic medical conditions as outlined. Delirium precautions    9. Bronchiectasis- with chronic cough reported. Continue incruse inhaler. Schedule albuterol nebs TID. Schedule robitussin 10ml TID + TID PRN. Speech therapy consult to rule out component of aspiration.    10. Left renal mass- concern for renal cell carcinoma per radiology reports; ongoing goals of care discussion- further workup vs continued monitoring following discharge from SNF rehab    11. L frontal lobe meningioma- neurology notes reviewed, following with neurosurgery at [facility] with plan for follow up MRI Nov 2024.     12. Deconditioning/Debility- Multifactorial. Admit to SNF for rehab. PT and OT consults placed- evaluate and treat. Supportive care, nutritional support, ADL support. Fall precautions. Management of acute and chronic medical conditions as outlined    Advanced Directives: POLST completed following SNF admission  Code status:Updated to DNR while at SNF  PCP: MD Sofy William, DO  7/31/24

## 2024-08-01 ENCOUNTER — PATIENT OUTREACH (OUTPATIENT)
Dept: CASE MANAGEMENT | Facility: OTHER | Age: 79
End: 2024-08-01

## 2024-08-01 NOTE — PROGRESS NOTES
LILLY AMARO reviewed patient's chart.  She was discharged from the hospital and is now at [facility] for Rehab.  LILLY AMARO reached out to patient's daughter regarding possible PAP interest for several medications.  LILLY AMARO unable to leave voicemail as there was none available.  If no return call received, plan for f/u next week

## 2024-08-02 ENCOUNTER — NURSING HOME VISIT (OUTPATIENT)
Dept: GERIATRICS | Facility: OTHER | Age: 79
End: 2024-08-02
Payer: MEDICARE

## 2024-08-02 DIAGNOSIS — N18.31 STAGE 3A CHRONIC KIDNEY DISEASE (HCC): ICD-10-CM

## 2024-08-02 DIAGNOSIS — I10 ESSENTIAL HYPERTENSION: ICD-10-CM

## 2024-08-02 DIAGNOSIS — E87.1 HYPONATREMIA: ICD-10-CM

## 2024-08-02 DIAGNOSIS — J90 RECURRENT PLEURAL EFFUSION ON RIGHT: Primary | ICD-10-CM

## 2024-08-02 PROCEDURE — 99309 SBSQ NF CARE MODERATE MDM 30: CPT | Performed by: NURSE PRACTITIONER

## 2024-08-02 NOTE — PROGRESS NOTES
[facility] at [address]  [place]  [address]  [phone]    SNF Rehab: POS 31  Progress Note    ASSESSMENT AND PLAN:  1. Recurrent pleural effusion on right  Assessment & Plan:  History of right sided pleural effusion in the setting of bronchiectasis and pulmonary hypertension.  Status post thoracentesis on 07/15/2024   Chest xray on 24 showed new small right sided pleural effusion  Status post right thoracentesis with removal of 1.2 Liters of fluid  Patient was taking lasix 40 mg po twice daily then transitioned to torsemide 10 mg daily inpatient due to declining renal function  Will monitor respiratory status closely  2. Stage 3a chronic kidney disease (HCC)  Assessment & Plan:  Baseline Cr. 0.9-1.11  Cr  1.25  Encourage adequate hydration  Avoid nephrotoxins and hypotension. Will add hold parameters to metoprolol.   Will monitor BMP    3. Essential hypertension  Assessment & Plan:  BP soft today at 106/58  Continue metoprolol, will add hold parameters  Continue bumex  Will monitor electrolytes and renal function  4. Hyponatremia  Assessment & Plan:  Sodium level baseline 128-134 inpatient  Sodium currently 134  Continue adequate hydration  Will monitor BMP        Name: Farnaz Martin                 : 1945               Sex: female    HPI:  Patient evaluated today in SNF rehab to follow-up on acute and chronic medical conditions. Upon examination, patient is awake, alert and in no acute distress. She states that she was a little sob this am but now feels better. She denies chest pain.  Refer to assessment and plan for further HPI. The following portions of the patient's history were reviewed and updated as appropriate: allergies, current medications, past family history, past medical history, past social history, past surgical history and problem list.    ROS: Review of Systems   Constitutional:  Negative for chills and fever.    Respiratory:  Positive for cough and shortness of breath. Negative for chest tightness.         Intermittent periods of sob   Cardiovascular:  Negative for chest pain and palpitations.   Gastrointestinal:  Negative for abdominal pain.       Allergies   Allergen Reactions    Sotalol Other (See Comments)     Prolonged QTc leading to torsades de pointes     Penicillins Other (See Comments)     As a child calcium deposit in the arm     Ace Inhibitors GI Intolerance     Did feel well on it    Omeprazole Abdominal Pain, Rash and Vomiting     stomach pain, vomiting, rash       Medications:    Current Outpatient Medications on File Prior to Visit   Medication Sig Dispense Refill    albuterol (ProAir HFA) 90 mcg/act inhaler Inhale 2 puffs every 6 (six) hours as needed for wheezing 8.5 g 3    apixaban (ELIQUIS) 5 mg Take 1 tablet (5 mg total) by mouth 2 (two) times a day 60 tablet 3    ascorbic Acid (VITAMIN C) 500 MG CPCR Take 500 mg by mouth daily      benzonatate (TESSALON PERLES) 100 mg capsule Take 1 capsule (100 mg total) by mouth 3 (three) times a day as needed for cough 20 capsule 0    Cetirizine HCl (ZyrTEC Allergy) 10 MG CAPS Take 10 mg by mouth in the morning      Cholecalciferol 50 MCG (2000 UT) CAPS Take 2,000 Units by mouth 2 (two) times a day      dapagliflozin 5 MG TABS Take 1 tablet (5 mg total) by mouth daily 30 tablet 5    diltiazem (CARDIZEM CD) 120 mg 24 hr capsule Take 120 mg by mouth daily      ezetimibe (ZETIA) 10 mg tablet Take 1 tablet (10 mg total) by mouth daily at bedtime 30 tablet 0    famotidine (PEPCID) 20 mg tablet Take 20 mg by mouth 2 (two) times a day M, W, F in the AM      flecainide (TAMBOCOR) 150 MG tablet Take 1 tablet (150 mg total) by mouth 2 (two) times a day 30 tablet 0    gabapentin (NEURONTIN) 300 mg capsule take 1 capsule by mouth at bedtime 90 capsule 1    glucose blood (OneTouch Verio) test strip Check blood sugars once daily. Please substitute with appropriate alternative as  covered by patient's insurance. Dx: E11.65 100 each 3    Insulin Glargine Solostar (Lantus SoloStar) 100 UNIT/ML SOPN Inject 0.1 mL (10 Units total) under the skin daily at bedtime 15 mL 1    Insulin Pen Needle 31G X 4 MM MISC Use daily at bedtime 100 each 2    ipratropium (ATROVENT) 0.03 % nasal spray 2 sprays into each nostril 3 (three) times a day Begin once per day, then progress to twice per day. Progress to three times a day as tolerated. 90 mL 3    metoprolol succinate (TOPROL-XL) 25 mg 24 hr tablet take 3 tablets by mouth every 12 hours 540 tablet 2    OneTouch Delica Lancets 33G MISC Check blood sugars once daily. Please substitute with appropriate alternative as covered by patient's insurance. Dx: E11.65 100 each 3    rOPINIRole (REQUIP) 1 mg tablet Take 2 tablets (2 mg total) by mouth daily at bedtime 180 tablet 1    tiotropium (Spiriva Respimat) 2.5 MCG/ACT AERS inhaler Inhale 2 puffs daily 4 g 2    TURMERIC PO Take 1 capsule by mouth 2 (two) times a day      zolpidem (AMBIEN) 10 mg tablet Take 1 tablet (10 mg total) by mouth daily at bedtime 14 tablet 0     No current facility-administered medications on file prior to visit.       History:  Past Medical History:   Diagnosis Date    Actinic keratosis     last assessed - 06Jun2014    Arthritis     Atrial fibrillation with rapid ventricular response (HCC)     last assessed - 26Apr2016    Atrial fibrillation with rapid ventricular response (HCC) 04/19/2016    Basal cell carcinoma     Benign colon polyp     last assessed - 27Apr2015    Bronchiectasis without complication (HCC)     CHF (congestive heart failure) (HCC) 06/2022    Chronic diastolic CHF (congestive heart failure) (HCC)     Disease of thyroid gland     Effusion of knee joint right     Resolved - 19Apr2016    Esophageal reflux     Fibromyalgia     last assessed - 27Dec2017    Fibromyalgia, primary     GERD (gastroesophageal reflux disease)     Hyperlipidemia     Hypertension     Hyponatremia      Malignant neoplasm of thyroid gland (HCC)     Meningioma (HCC)     MGUS (monoclonal gammopathy of unknown significance)     Palpitations     last assessed - [id number]    Peroneal tendonitis, right     last assessed - [id number]    Right lumbar radiculopathy 03/17/2016    Thyroid cancer (HCC)     Thyroid nodule     Trochanteric bursitis of left hip 03/09/2018     Past Surgical History:   Procedure Laterality Date    CARDIAC ELECTROPHYSIOLOGY STUDY AND ABLATION  08/2022    CATARACT EXTRACTION Bilateral     COLONOSCOPY  03/2018    COLONOSCOPY W/ POLYPECTOMY  2021    repeat in 5 years    EYE SURGERY      IR PLEURAL EFFUSION LONG-TERM CATHETER PLACEMENT  8/19/2024    IR THORACENTESIS  7/27/2024    IR THORACENTESIS  8/13/2024    OOPHORECTOMY      OR NEUROPLASTY &/TRANSPOS MEDIAN NRV CARPAL TUNNE Right 11/14/2019    Procedure: RELEASE CARPAL TUNNEL;  Surgeon: Onesimo Tate MD;  [facility];  Service: Orthopedics    OR TOTAL THYROID LOBECTOMY UNI W/WO ISTHMUSECTOMY Left 12/16/2020    Procedure: Left THYROID LOBECTOMY;  Surgeon: Jhony Bhatt MD;  [facility];  Service: ENT    RECTAL POLYPECTOMY      REPLACEMENT TOTAL KNEE Left     last assessed - [id number]    TONSILLECTOMY      TOTAL ABDOMINAL HYSTERECTOMY      TUBAL LIGATION      US GUIDED THYROID BIOPSY  10/14/2020     Family History   Problem Relation Age of Onset    Heart disease Mother     Diabetes Mother     Heart disease Father     Coronary artery disease Father     Stroke Father         cerebrovascular accident    Heart attack Father         myocardial infarction    Sudden death Father         scd    Other Family         Back disorder    Coronary artery disease Family     Neuropathy Family     Osteoporosis Family     No Known Problems Daughter     No Known Problems Maternal Grandmother     No Known Problems Maternal Grandfather     No Known Problems Paternal Grandmother     No Known Problems Paternal Grandfather     Cancer Maternal Uncle     Breast  cancer Maternal Aunt 65    No Known Problems Son     No Known Problems Maternal Aunt     No Known Problems Maternal Aunt     No Known Problems Maternal Aunt     No Known Problems Paternal Aunt     No Known Problems Paternal Aunt     Anuerysm Neg Hx     Clotting disorder Neg Hx     Arrhythmia Neg Hx     Heart failure Neg Hx      Social History     Socioeconomic History    Marital status:      Spouse name: Not on file    Number of children: Not on file    Years of education: Not on file    Highest education level: Not on file   Occupational History    Not on file   Tobacco Use    Smoking status: Never    Smokeless tobacco: Never   Vaping Use    Vaping status: Never Used   Substance and Sexual Activity    Alcohol use: Not Currently    Drug use: Never    Sexual activity: Not Currently   Other Topics Concern    Not on file   Social History Narrative    ** Merged History Encounter **          Social Determinants of Health     Financial Resource Strain: Patient Unable To Answer (12/19/2023)    Overall Financial Resource Strain (CARDIA)     Difficulty of Paying Living Expenses: Patient unable to answer   Food Insecurity: No Food Insecurity (7/25/2024)    Hunger Vital Sign     Worried About Running Out of Food in the Last Year: Never true     Ran Out of Food in the Last Year: Never true   Transportation Needs: No Transportation Needs (7/25/2024)    PRAPARE - Transportation     Lack of Transportation (Medical): No     Lack of Transportation (Non-Medical): No   Physical Activity: Not on file   Stress: Not on file   Social Connections: Not on file   Intimate Partner Violence: Not At Risk (7/31/2023)    [facility]    Humiliation, Afraid, Rape, and Kick questionnaire     Fear of Current or Ex-Partner: No     Emotionally Abused: No     Physically Abused: No     Sexually Abused: No   Housing Stability: Low Risk  (7/25/2024)    Housing Stability Vital Sign      Unable to Pay for Housing in the Last Year: No     Number of Times Moved in the Last Year: 0     Homeless in the Last Year: No     Past Surgical History:   Procedure Laterality Date    CARDIAC ELECTROPHYSIOLOGY STUDY AND ABLATION  08/2022    CATARACT EXTRACTION Bilateral     COLONOSCOPY  03/2018    COLONOSCOPY W/ POLYPECTOMY  2021    repeat in 5 years    EYE SURGERY      IR PLEURAL EFFUSION LONG-TERM CATHETER PLACEMENT  8/19/2024    IR THORACENTESIS  7/27/2024    IR THORACENTESIS  8/13/2024    OOPHORECTOMY      WI NEUROPLASTY &/TRANSPOS MEDIAN NRV CARPAL TUNNE Right 11/14/2019    Procedure: RELEASE CARPAL TUNNEL;  Surgeon: Onesimo Tate MD;  [facility];  Service: Orthopedics    WI TOTAL THYROID LOBECTOMY UNI W/WO ISTHMUSECTOMY Left 12/16/2020    Procedure: Left THYROID LOBECTOMY;  Surgeon: Jhony Bhatt MD;  [facility];  Service: ENT    RECTAL POLYPECTOMY      REPLACEMENT TOTAL KNEE Left     last assessed - 27Apr2015    TONSILLECTOMY      TOTAL ABDOMINAL HYSTERECTOMY      TUBAL LIGATION      US GUIDED THYROID BIOPSY  10/14/2020       OBJECTIVE:  Vital Signs:  /58, Temp 98.1, HR 64, RR 18, SpO2 95% RA, Wgt 165.6 lbs.  Physical Exam  Vitals and nursing note reviewed.   Constitutional:       General: She is not in acute distress.     Appearance: Normal appearance. She is not ill-appearing, toxic-appearing or diaphoretic.   Cardiovascular:      Rate and Rhythm: Normal rate and regular rhythm.      Pulses: Normal pulses.   Pulmonary:      Effort: Pulmonary effort is normal. No respiratory distress.      Breath sounds: Rhonchi present. No wheezing or rales.      Comments: Bilateral lower lung fields  Abdominal:      General: Bowel sounds are normal.      Palpations: Abdomen is soft.   Skin:     General: Skin is warm and dry.   Neurological:      Mental Status: She is alert. Mental status is at baseline.      Gait: Gait abnormal.   Psychiatric:         Mood and Affect: Mood normal.          Behavior: Behavior normal.         Thought Content: Thought content normal.         Pertinent labs & imaging reviewed in EMR    JENNY Monroy  Geriatric Medicine  08/02/2024

## 2024-08-05 ENCOUNTER — TELEPHONE (OUTPATIENT)
Dept: OTHER | Facility: OTHER | Age: 79
End: 2024-08-05

## 2024-08-05 ENCOUNTER — NURSING HOME VISIT (OUTPATIENT)
Dept: GERIATRICS | Facility: OTHER | Age: 79
End: 2024-08-05
Payer: MEDICARE

## 2024-08-05 DIAGNOSIS — J90 RECURRENT PLEURAL EFFUSION ON RIGHT: ICD-10-CM

## 2024-08-05 DIAGNOSIS — Z71.89 GOALS OF CARE, COUNSELING/DISCUSSION: ICD-10-CM

## 2024-08-05 DIAGNOSIS — R05.3 CHRONIC COUGH: ICD-10-CM

## 2024-08-05 DIAGNOSIS — J47.9 BRONCHIECTASIS WITHOUT COMPLICATION (HCC): ICD-10-CM

## 2024-08-05 DIAGNOSIS — R06.02 SHORTNESS OF BREATH: Primary | ICD-10-CM

## 2024-08-05 LAB — FLECAINIDE SERPL-MCNC: 2.32 UG/ML (ref 0.2–1)

## 2024-08-05 PROCEDURE — 99310 SBSQ NF CARE HIGH MDM 45: CPT | Performed by: NURSE PRACTITIONER

## 2024-08-05 RX ORDER — ALBUTEROL SULFATE 2.5 MG/3ML
2.5 SOLUTION RESPIRATORY (INHALATION) 4 TIMES DAILY
Status: ON HOLD
Start: 2024-08-05

## 2024-08-05 NOTE — ASSESSMENT & PLAN NOTE
History of bronchiectasis  Increased sob and cough reported  Continue scheduled and prn cough medication  Continue neb treatments

## 2024-08-05 NOTE — ASSESSMENT & PLAN NOTE
In the setting of history of bronchiectasis since 02/2024  Continue scheduled and prn cough medications  Stat chest xray and blood work ordered  Albuterol nebs increased to four times daily

## 2024-08-05 NOTE — PROGRESS NOTES
[facility] at [address]  EREN Shaffer  [id number] [phone]    Acute Visit Note  SNF Rehab: POS [facility]    ASSESSMENT AND PLAN:  1. Shortness of breath  Assessment & Plan:  Increased sob reported today  History of bronchiectasis and recurrent pleural effusion  Will order stat chest x-ray to rule out cardio-pulmonary abnormalities  Stat CBC and BMP ordered  Continue oxygen nasal cannula to keep sats greater than or equal to 92%.   Orders:  -     albuterol (2.5 mg/3 mL) 0.083 % nebulizer solution; Take 3 mL (2.5 mg total) by nebulization 4 (four) times a day  2. Recurrent pleural effusion on right  Assessment & Plan:  History of right sided pleural effusion in the setting of bronchiectasis and pulmonary hypertension.  Status post thoracentesis on 07/15/2024   Chest xray on 7/22/24 showed new small right sided pleural effusion  Status post right thoracentesis with removal of 1.2 Liters of fluid  Patient was taking lasix 40 mg po twice daily then transitioned to torsemide 10 mg daily inpatient due to declining renal function  Concern for fluid re accumulation  Will give an extra 10 mg po of torsemide stat and consider increasing dose based on chest x-ray and blood work results  If patient further declines, will send to ER to evaluate and treat.   3. Bronchiectasis without complication (HCC)  Assessment & Plan:  History of bronchiectasis  Increased sob and cough reported  Continue scheduled and prn cough medication  Continue neb treatments  4. Chronic cough  Assessment & Plan:  In the setting of history of bronchiectasis since 02/2024  Continue scheduled and prn cough medications  Stat chest xray and blood work ordered  Albuterol nebs increased to four times daily   5. Goals of care, counseling/discussion  Assessment & Plan:  Goals of care discussed with daughter POA  Patient has a history of bronchiectasis with recurrent pleural effusions requiring multiple  hospitalizations.  Code status in SNF is DNR with hospitalization.   Per daughter, hospice services may be considered by family if patient continues to decline to ensure comfort.  Will further discuss hospice service consult based on imaging and blood work results  Follow-up with .       Name: Farnaz Martin                 : 1945               Sex: female    HPI:    79-year-old female patient with co-morbidities that includes bronchiectasis and recurrent pleural effusion seen and examined today for increased sob and cough. Patient states that this morning at breakfast, she could not catch her breath and is coughing more than usual.  Upon examination, patient is awake, alert and in no acute distress. She states that she is feeling better with oxygen on. Her oxygen saturation is currently 94% on 2L nasal cannula.     The following portions of the patient's history were reviewed and updated as appropriate: allergies, current medications, past family history, past medical history, past social history, past surgical history and problem list.    ROS: Review of Systems   Constitutional:  Negative for chills and fever.   HENT:  Positive for congestion and rhinorrhea. Negative for sinus pressure and sinus pain.    Respiratory:  Positive for cough, shortness of breath and wheezing. Negative for chest tightness.    Gastrointestinal:  Negative for abdominal pain, nausea and vomiting.   Neurological:  Negative for dizziness and headaches.       Allergies   Allergen Reactions    Sotalol Other (See Comments)     Prolonged QTc leading to torsades de pointes     Penicillins Other (See Comments)     As a child calcium deposit in the arm     Ace Inhibitors GI Intolerance     Did feel well on it    Omeprazole Abdominal Pain, Rash and Vomiting     stomach pain, vomiting, rash       Medications:    Current Outpatient Medications on File Prior to Visit   Medication Sig Dispense Refill    albuterol (ProAir HFA) 90  mcg/act inhaler Inhale 2 puffs every 6 (six) hours as needed for wheezing 8.5 g 3    apixaban (ELIQUIS) 5 mg Take 1 tablet (5 mg total) by mouth 2 (two) times a day 60 tablet 3    ascorbic Acid (VITAMIN C) 500 MG CPCR Take 500 mg by mouth daily      benzonatate (TESSALON PERLES) 100 mg capsule Take 1 capsule (100 mg total) by mouth 3 (three) times a day as needed for cough 20 capsule 0    Cetirizine HCl (ZyrTEC Allergy) 10 MG CAPS Take 10 mg by mouth in the morning      Cholecalciferol 50 MCG (2000 UT) CAPS Take 2,000 Units by mouth 2 (two) times a day      Chromium Picolinate (CHROMIUM PICOLATE PO) Take 1 tablet by mouth daily      dapagliflozin 5 MG TABS Take 1 tablet (5 mg total) by mouth daily 30 tablet 5    diltiazem (CARDIZEM CD) 120 mg 24 hr capsule Take 120 mg by mouth daily      ezetimibe (ZETIA) 10 mg tablet Take 1 tablet (10 mg total) by mouth daily at bedtime 30 tablet 0    famotidine (PEPCID) 20 mg tablet Take 20 mg by mouth 2 (two) times a day M, W, F in the AM      flecainide (TAMBOCOR) 150 MG tablet Take 1 tablet (150 mg total) by mouth 2 (two) times a day 30 tablet 0    gabapentin (NEURONTIN) 300 mg capsule take 1 capsule by mouth at bedtime 90 capsule 1    glucose blood (OneTouch Verio) test strip Check blood sugars once daily. Please substitute with appropriate alternative as covered by patient's insurance. Dx: E11.65 100 each 3    Insulin Glargine Solostar (Lantus SoloStar) 100 UNIT/ML SOPN Inject 0.1 mL (10 Units total) under the skin daily at bedtime 15 mL 1    Insulin Pen Needle 31G X 4 MM MISC Use daily at bedtime 100 each 2    ipratropium (ATROVENT) 0.03 % nasal spray 2 sprays into each nostril 3 (three) times a day Begin once per day, then progress to twice per day. Progress to three times a day as tolerated. (Patient taking differently: 2 sprays into each nostril if needed Begin once per day, then progress to twice per day. Progress to three times a day as tolerated.) 90 mL 3    metoprolol  succinate (TOPROL-XL) 25 mg 24 hr tablet take 3 tablets by mouth every 12 hours 540 tablet 2    OneTouch Delica Lancets 33G MISC Check blood sugars once daily. Please substitute with appropriate alternative as covered by patient's insurance. Dx: E11.65 100 each 3    rOPINIRole (REQUIP) 1 mg tablet Take 2 tablets (2 mg total) by mouth daily at bedtime 180 tablet 1    tiotropium (Spiriva Respimat) 2.5 MCG/ACT AERS inhaler Inhale 2 puffs daily 4 g 2    torsemide (DEMADEX) 10 mg tablet Take 1 tablet (10 mg total) by mouth daily 30 tablet 0    TURMERIC PO Take 1 capsule by mouth 2 (two) times a day      zolpidem (AMBIEN) 10 mg tablet Take 1 tablet (10 mg total) by mouth daily at bedtime 14 tablet 0    [DISCONTINUED] predniSONE 10 mg tablet  (Patient not taking: Reported on 7/22/2024)      [DISCONTINUED] sodium chloride 3 % inhalation solution Take 4 mL by nebulization as needed for cough 152 mL 2     No current facility-administered medications on file prior to visit.       History:  Past Medical History:   Diagnosis Date    Actinic keratosis     last assessed - 06Jun2014    Arthritis     Atrial fibrillation with rapid ventricular response (HCC)     last assessed - 26Apr2016    Atrial fibrillation with rapid ventricular response (HCC) 04/19/2016    Basal cell carcinoma     Benign colon polyp     last assessed - 27Apr2015    Bronchiectasis without complication (HCC)     CHF (congestive heart failure) (HCC) 06/2022    Chronic diastolic CHF (congestive heart failure) (HCC)     Disease of thyroid gland     Effusion of knee joint right     Resolved - 19Apr2016    Esophageal reflux     Fibromyalgia     last assessed - 27Dec2017    Fibromyalgia, primary     GERD (gastroesophageal reflux disease)     Hyperlipidemia     Hypertension     Hyponatremia     Malignant neoplasm of thyroid gland (HCC)     Meningioma (HCC)     MGUS (monoclonal gammopathy of unknown significance)     Palpitations     last assessed - 30Apr2013    Peroneal  tendonitis, right     last assessed - 02Oct2013    Right lumbar radiculopathy 03/17/2016    Thyroid cancer (HCC)     Thyroid nodule     Trochanteric bursitis of left hip 03/09/2018     Past Surgical History:   Procedure Laterality Date    CARDIAC ELECTROPHYSIOLOGY STUDY AND ABLATION  08/2022    CATARACT EXTRACTION Bilateral     COLONOSCOPY  03/2018    COLONOSCOPY W/ POLYPECTOMY  2021    repeat in 5 years    EYE SURGERY      IR THORACENTESIS  7/27/2024    OOPHORECTOMY      DE NEUROPLASTY &/TRANSPOS MEDIAN NRV CARPAL TUNNE Right 11/14/2019    Procedure: RELEASE CARPAL TUNNEL;  Surgeon: Onesimo Tate MD;  [facility]: [facility];  Service: Orthopedics    DE TOTAL THYROID LOBECTOMY UNI W/WO ISTHMUSECTOMY Left 12/16/2020    Procedure: Left THYROID LOBECTOMY;  Surgeon: Jhony Bhatt MD;  Location[facility];  Service: ENT    RECTAL POLYPECTOMY      REPLACEMENT TOTAL KNEE Left     last assessed - 27Apr2015    TONSILLECTOMY      TOTAL ABDOMINAL HYSTERECTOMY      TUBAL LIGATION      US GUIDED THYROID BIOPSY  10/14/2020     Family History   Problem Relation Age of Onset    Heart disease Mother     Diabetes Mother     Heart disease Father     Coronary artery disease Father     Stroke Father         cerebrovascular accident    Heart attack Father         myocardial infarction    Sudden death Father         scd    Other Family         Back disorder    Coronary artery disease Family     Neuropathy Family     Osteoporosis Family     No Known Problems Daughter     No Known Problems Maternal Grandmother     No Known Problems Maternal Grandfather     No Known Problems Paternal Grandmother     No Known Problems Paternal Grandfather     Cancer Maternal Uncle     Breast cancer Maternal Aunt 65    No Known Problems Son     No Known Problems Maternal Aunt     No Known Problems Maternal Aunt     No Known Problems Maternal Aunt     No Known Problems Paternal Aunt     No Known Problems Paternal Aunt     Anuerysm Neg Hx     Clotting disorder Neg  Hx     Arrhythmia Neg Hx     Heart failure Neg Hx      Social History     Socioeconomic History    Marital status:      Spouse name: Not on file    Number of children: Not on file    Years of education: Not on file    Highest education level: Not on file   Occupational History    Not on file   Tobacco Use    Smoking status: Never    Smokeless tobacco: Never   Vaping Use    Vaping status: Never Used   Substance and Sexual Activity    Alcohol use: Not Currently    Drug use: Never    Sexual activity: Not Currently   Other Topics Concern    Not on file   Social History Narrative    ** Merged History Encounter **          Social Determinants of Health     Financial Resource Strain: Patient Unable To Answer (12/19/2023)    Overall Financial Resource Strain (CARDIA)     Difficulty of Paying Living Expenses: Patient unable to answer   Food Insecurity: No Food Insecurity (7/25/2024)    Hunger Vital Sign     Worried About Running Out of Food in the Last Year: Never true     Ran Out of Food in the Last Year: Never true   Transportation Needs: No Transportation Needs (7/25/2024)    PRAPARE - Transportation     Lack of Transportation (Medical): No     Lack of Transportation (Non-Medical): No   Physical Activity: Not on file   Stress: Not on file   Social Connections: Not on file   Intimate Partner Violence: Not At Risk (7/31/2023)    [facility]    Humiliation, Afraid, Rape, and Kick questionnaire     Fear of Current or Ex-Partner: No     Emotionally Abused: No     Physically Abused: No     Sexually Abused: No   Housing Stability: Low Risk  (7/25/2024)    Housing Stability Vital Sign     Unable to Pay for Housing in the Last Year: No     Number of Times Moved in the Last Year: 0     Homeless in the Last Year: No     Past Surgical History:   Procedure Laterality Date    CARDIAC ELECTROPHYSIOLOGY STUDY AND ABLATION  08/2022    CATARACT EXTRACTION Bilateral      COLONOSCOPY  03/2018    COLONOSCOPY W/ POLYPECTOMY  2021    repeat in 5 years    EYE SURGERY      IR THORACENTESIS  7/27/2024    OOPHORECTOMY      GA NEUROPLASTY &/TRANSPOS MEDIAN NRV CARPAL TUNNE Right 11/14/2019    Procedure: RELEASE CARPAL TUNNEL;  Surgeon: Onesimo Tate MD;  [facility];  Service: Orthopedics    GA TOTAL THYROID LOBECTOMY UNI W/WO ISTHMUSECTOMY Left 12/16/2020    Procedure: Left THYROID LOBECTOMY;  Surgeon: Jhony Bhatt MD;  [facility];  Service: ENT    RECTAL POLYPECTOMY      REPLACEMENT TOTAL KNEE Left     last assessed - 27Apr2015    TONSILLECTOMY      TOTAL ABDOMINAL HYSTERECTOMY      TUBAL LIGATION      US GUIDED THYROID BIOPSY  10/14/2020       OBJECTIVE:  Vital Signs:  /74, Temp 97.6, HR 90, RR 20, SpO2 95% RA, Wgt: 167.8 lbs   Physical Exam  Constitutional:       General: She is not in acute distress.     Appearance: She is ill-appearing. She is not toxic-appearing or diaphoretic.   Cardiovascular:      Rate and Rhythm: Normal rate.      Pulses: Normal pulses.   Pulmonary:      Effort: Pulmonary effort is normal. No respiratory distress.      Breath sounds: Wheezing and rales present.   Musculoskeletal:      Right lower leg: Edema present.      Left lower leg: Edema present.   Skin:     General: Skin is dry.   Neurological:      Mental Status: She is alert. Mental status is at baseline.   Psychiatric:         Mood and Affect: Mood normal.         Behavior: Behavior normal.         Thought Content: Thought content normal.         Labs & Imaging Reviewed in Epic and facility EMR.   Lab Results   Component Value Date    WBC 6.86 07/29/2024    HGB 11.1 (L) 07/29/2024    HCT 35.4 07/29/2024    MCV 93 07/29/2024     07/29/2024     Lab Results   Component Value Date    SODIUM 131 (L) 07/29/2024    K 4.1 07/29/2024    CL 97 07/29/2024    CO2 30 07/29/2024    BUN 39 (H) 07/29/2024    CREATININE 1.22 07/29/2024    GLUC 74 07/29/2024    CALCIUM 8.8 07/29/2024     Lab  Results   Component Value Date    DNJEYWEZ33 799 07/01/2022     Lab Results   Component Value Date    UPC5MTLXURKE 3.416 07/26/2024     Lab Results   Component Value Date    EQIQ35NFBQMC 41.3 05/06/2015        JENNY Monroy  Geriatric Medicine  08/05/2024

## 2024-08-05 NOTE — ASSESSMENT & PLAN NOTE
Goals of care discussed with daughter POA  Patient has a history of bronchiectasis with recurrent pleural effusions requiring multiple hospitalizations.  Code status in SNF is DNR with hospitalization.   Per daughter, hospice services may be considered by family if patient continues to decline to ensure comfort.  Will further discuss hospice service consult based on imaging and blood work results  Follow-up with .

## 2024-08-05 NOTE — ASSESSMENT & PLAN NOTE
History of right sided pleural effusion in the setting of bronchiectasis and pulmonary hypertension.  Status post thoracentesis on 07/15/2024   Chest xray on 7/22/24 showed new small right sided pleural effusion  Status post right thoracentesis with removal of 1.2 Liters of fluid  Patient was taking lasix 40 mg po twice daily then transitioned to torsemide 10 mg daily inpatient due to declining renal function  Concern for fluid re accumulation  Will give an extra 10 mg po of torsemide stat and consider increasing dose based on chest x-ray and blood work results  If patient further declines, will send to ER to evaluate and treat.

## 2024-08-05 NOTE — ASSESSMENT & PLAN NOTE
Increased sob reported today  History of bronchiectasis and recurrent pleural effusion  Will order stat chest x-ray to rule out cardio-pulmonary abnormalities  Stat CBC and BMP ordered  Continue oxygen nasal cannula to keep sats greater than or equal to 92%.

## 2024-08-06 ENCOUNTER — APPOINTMENT (EMERGENCY)
Dept: RADIOLOGY | Facility: HOSPITAL | Age: 79
DRG: 291 | End: 2024-08-06
Payer: MEDICARE

## 2024-08-06 ENCOUNTER — HOSPITAL ENCOUNTER (INPATIENT)
Facility: HOSPITAL | Age: 79
LOS: 14 days | Discharge: NURSING, REHAB OR LONG-TERM CARE | DRG: 291 | End: 2024-08-20
Attending: EMERGENCY MEDICINE | Admitting: HOSPITALIST
Payer: MEDICARE

## 2024-08-06 ENCOUNTER — PATIENT OUTREACH (OUTPATIENT)
Dept: CASE MANAGEMENT | Facility: OTHER | Age: 79
End: 2024-08-06

## 2024-08-06 DIAGNOSIS — J90 PLEURAL EFFUSION: ICD-10-CM

## 2024-08-06 DIAGNOSIS — R09.02 HYPOXIA: ICD-10-CM

## 2024-08-06 DIAGNOSIS — Z51.5 PALLIATIVE CARE PATIENT: ICD-10-CM

## 2024-08-06 DIAGNOSIS — R79.89 ELEVATED SERUM CREATININE: ICD-10-CM

## 2024-08-06 DIAGNOSIS — I50.32 CHRONIC HEART FAILURE WITH PRESERVED EJECTION FRACTION (HCC): Chronic | ICD-10-CM

## 2024-08-06 DIAGNOSIS — I50.33 ACUTE ON CHRONIC DIASTOLIC HEART FAILURE (HCC): ICD-10-CM

## 2024-08-06 DIAGNOSIS — I50.9 ACUTE EXACERBATION OF CHF (CONGESTIVE HEART FAILURE) (HCC): Primary | ICD-10-CM

## 2024-08-06 DIAGNOSIS — R06.02 SHORTNESS OF BREATH: ICD-10-CM

## 2024-08-06 LAB
2HR DELTA HS TROPONIN: 1 NG/L
ALBUMIN SERPL BCG-MCNC: 3.7 G/DL (ref 3.5–5)
ALP SERPL-CCNC: 70 U/L (ref 34–104)
ALT SERPL W P-5'-P-CCNC: 39 U/L (ref 7–52)
ANION GAP SERPL CALCULATED.3IONS-SCNC: 8 MMOL/L (ref 4–13)
AST SERPL W P-5'-P-CCNC: 36 U/L (ref 13–39)
ATRIAL RATE: 62 BPM
BASOPHILS # BLD AUTO: 0.05 THOUSANDS/ÂΜL (ref 0–0.1)
BASOPHILS NFR BLD AUTO: 1 % (ref 0–1)
BILIRUB SERPL-MCNC: 0.87 MG/DL (ref 0.2–1)
BNP SERPL-MCNC: 1091 PG/ML (ref 0–100)
BUN SERPL-MCNC: 31 MG/DL (ref 5–25)
CALCIUM SERPL-MCNC: 9 MG/DL (ref 8.4–10.2)
CARDIAC TROPONIN I PNL SERPL HS: 10 NG/L
CARDIAC TROPONIN I PNL SERPL HS: 11 NG/L
CHLORIDE SERPL-SCNC: 98 MMOL/L (ref 96–108)
CO2 SERPL-SCNC: 29 MMOL/L (ref 21–32)
CREAT SERPL-MCNC: 1.38 MG/DL (ref 0.6–1.3)
EOSINOPHIL # BLD AUTO: 0.18 THOUSAND/ÂΜL (ref 0–0.61)
EOSINOPHIL NFR BLD AUTO: 3 % (ref 0–6)
ERYTHROCYTE [DISTWIDTH] IN BLOOD BY AUTOMATED COUNT: 14.6 % (ref 11.6–15.1)
FLUAV RNA RESP QL NAA+PROBE: NEGATIVE
FLUBV RNA RESP QL NAA+PROBE: NEGATIVE
GFR SERPL CREATININE-BSD FRML MDRD: 36 ML/MIN/1.73SQ M
GLUCOSE SERPL-MCNC: 138 MG/DL (ref 65–140)
HCT VFR BLD AUTO: 36.3 % (ref 34.8–46.1)
HGB BLD-MCNC: 11.5 G/DL (ref 11.5–15.4)
IMM GRANULOCYTES # BLD AUTO: 0.02 THOUSAND/UL (ref 0–0.2)
IMM GRANULOCYTES NFR BLD AUTO: 0 % (ref 0–2)
LYMPHOCYTES # BLD AUTO: 1.13 THOUSANDS/ÂΜL (ref 0.6–4.47)
LYMPHOCYTES NFR BLD AUTO: 19 % (ref 14–44)
MCH RBC QN AUTO: 28.9 PG (ref 26.8–34.3)
MCHC RBC AUTO-ENTMCNC: 31.7 G/DL (ref 31.4–37.4)
MCV RBC AUTO: 91 FL (ref 82–98)
MONOCYTES # BLD AUTO: 0.61 THOUSAND/ÂΜL (ref 0.17–1.22)
MONOCYTES NFR BLD AUTO: 10 % (ref 4–12)
NEUTROPHILS # BLD AUTO: 3.9 THOUSANDS/ÂΜL (ref 1.85–7.62)
NEUTS SEG NFR BLD AUTO: 67 % (ref 43–75)
NRBC BLD AUTO-RTO: 0 /100 WBCS
PLATELET # BLD AUTO: 267 THOUSANDS/UL (ref 149–390)
PMV BLD AUTO: 10.2 FL (ref 8.9–12.7)
POTASSIUM SERPL-SCNC: 4.3 MMOL/L (ref 3.5–5.3)
PR INTERVAL: 202 MS
PROT SERPL-MCNC: 6.7 G/DL (ref 6.4–8.4)
QRS AXIS: 253 DEGREES
QRSD INTERVAL: 202 MS
QT INTERVAL: 558 MS
QTC INTERVAL: 566 MS
RBC # BLD AUTO: 3.98 MILLION/UL (ref 3.81–5.12)
RSV RNA RESP QL NAA+PROBE: NEGATIVE
SARS-COV-2 RNA RESP QL NAA+PROBE: NEGATIVE
SODIUM SERPL-SCNC: 135 MMOL/L (ref 135–147)
T WAVE AXIS: 95 DEGREES
VENTRICULAR RATE: 62 BPM
WBC # BLD AUTO: 5.89 THOUSAND/UL (ref 4.31–10.16)

## 2024-08-06 PROCEDURE — 71046 X-RAY EXAM CHEST 2 VIEWS: CPT

## 2024-08-06 PROCEDURE — 99285 EMERGENCY DEPT VISIT HI MDM: CPT

## 2024-08-06 PROCEDURE — 0241U HB NFCT DS VIR RESP RNA 4 TRGT: CPT | Performed by: EMERGENCY MEDICINE

## 2024-08-06 PROCEDURE — 0W993ZZ DRAINAGE OF RIGHT PLEURAL CAVITY, PERCUTANEOUS APPROACH: ICD-10-PCS | Performed by: RADIOLOGY

## 2024-08-06 PROCEDURE — 99285 EMERGENCY DEPT VISIT HI MDM: CPT | Performed by: EMERGENCY MEDICINE

## 2024-08-06 PROCEDURE — 99223 1ST HOSP IP/OBS HIGH 75: CPT | Performed by: INTERNAL MEDICINE

## 2024-08-06 PROCEDURE — 36415 COLL VENOUS BLD VENIPUNCTURE: CPT | Performed by: EMERGENCY MEDICINE

## 2024-08-06 PROCEDURE — 93010 ELECTROCARDIOGRAM REPORT: CPT | Performed by: INTERNAL MEDICINE

## 2024-08-06 PROCEDURE — 85025 COMPLETE CBC W/AUTO DIFF WBC: CPT | Performed by: EMERGENCY MEDICINE

## 2024-08-06 PROCEDURE — 80053 COMPREHEN METABOLIC PANEL: CPT | Performed by: EMERGENCY MEDICINE

## 2024-08-06 PROCEDURE — 93005 ELECTROCARDIOGRAM TRACING: CPT

## 2024-08-06 PROCEDURE — 96374 THER/PROPH/DIAG INJ IV PUSH: CPT

## 2024-08-06 PROCEDURE — 84484 ASSAY OF TROPONIN QUANT: CPT | Performed by: EMERGENCY MEDICINE

## 2024-08-06 PROCEDURE — 94640 AIRWAY INHALATION TREATMENT: CPT

## 2024-08-06 PROCEDURE — 83880 ASSAY OF NATRIURETIC PEPTIDE: CPT | Performed by: EMERGENCY MEDICINE

## 2024-08-06 RX ORDER — ROPINIROLE 1 MG/1
2 TABLET, FILM COATED ORAL
Status: DISCONTINUED | OUTPATIENT
Start: 2024-08-06 | End: 2024-08-20 | Stop reason: HOSPADM

## 2024-08-06 RX ORDER — EZETIMIBE 10 MG/1
10 TABLET ORAL
Status: DISCONTINUED | OUTPATIENT
Start: 2024-08-07 | End: 2024-08-06

## 2024-08-06 RX ORDER — ZOLPIDEM TARTRATE 5 MG/1
10 TABLET ORAL
Status: DISCONTINUED | OUTPATIENT
Start: 2024-08-06 | End: 2024-08-20 | Stop reason: HOSPADM

## 2024-08-06 RX ORDER — GUAIFENESIN/DEXTROMETHORPHAN 100-10MG/5
10 SYRUP ORAL EVERY 4 HOURS PRN
Status: DISCONTINUED | OUTPATIENT
Start: 2024-08-06 | End: 2024-08-20 | Stop reason: HOSPADM

## 2024-08-06 RX ORDER — EZETIMIBE 10 MG/1
10 TABLET ORAL
Status: DISCONTINUED | OUTPATIENT
Start: 2024-08-06 | End: 2024-08-06

## 2024-08-06 RX ORDER — LEVALBUTEROL INHALATION SOLUTION 1.25 MG/3ML
1.25 SOLUTION RESPIRATORY (INHALATION)
Status: DISCONTINUED | OUTPATIENT
Start: 2024-08-07 | End: 2024-08-08

## 2024-08-06 RX ORDER — EZETIMIBE 10 MG/1
10 TABLET ORAL
Status: DISCONTINUED | OUTPATIENT
Start: 2024-08-06 | End: 2024-08-20 | Stop reason: HOSPADM

## 2024-08-06 RX ORDER — FLECAINIDE ACETATE 50 MG/1
150 TABLET ORAL 2 TIMES DAILY
Status: DISCONTINUED | OUTPATIENT
Start: 2024-08-06 | End: 2024-08-20 | Stop reason: HOSPADM

## 2024-08-06 RX ORDER — FAMOTIDINE 20 MG/1
20 TABLET, FILM COATED ORAL 2 TIMES DAILY
Status: DISCONTINUED | OUTPATIENT
Start: 2024-08-06 | End: 2024-08-20 | Stop reason: HOSPADM

## 2024-08-06 RX ORDER — GABAPENTIN 300 MG/1
300 CAPSULE ORAL
Status: DISCONTINUED | OUTPATIENT
Start: 2024-08-06 | End: 2024-08-20 | Stop reason: HOSPADM

## 2024-08-06 RX ORDER — LORATADINE 10 MG/1
10 TABLET ORAL DAILY
Status: DISCONTINUED | OUTPATIENT
Start: 2024-08-07 | End: 2024-08-20 | Stop reason: HOSPADM

## 2024-08-06 RX ORDER — BENZONATATE 100 MG/1
100 CAPSULE ORAL 3 TIMES DAILY PRN
Status: DISCONTINUED | OUTPATIENT
Start: 2024-08-06 | End: 2024-08-20 | Stop reason: HOSPADM

## 2024-08-06 RX ORDER — IPRATROPIUM BROMIDE 21 UG/1
2 SPRAY, METERED NASAL 2 TIMES DAILY PRN
Status: DISCONTINUED | OUTPATIENT
Start: 2024-08-06 | End: 2024-08-06 | Stop reason: RX

## 2024-08-06 RX ORDER — ALBUTEROL SULFATE 0.83 MG/ML
2.5 SOLUTION RESPIRATORY (INHALATION) 4 TIMES DAILY
Status: DISCONTINUED | OUTPATIENT
Start: 2024-08-06 | End: 2024-08-06

## 2024-08-06 RX ORDER — FUROSEMIDE 10 MG/ML
40 INJECTION INTRAMUSCULAR; INTRAVENOUS ONCE
Status: COMPLETED | OUTPATIENT
Start: 2024-08-06 | End: 2024-08-06

## 2024-08-06 RX ORDER — DILTIAZEM HYDROCHLORIDE 120 MG/1
120 CAPSULE, COATED, EXTENDED RELEASE ORAL DAILY
Status: DISCONTINUED | OUTPATIENT
Start: 2024-08-07 | End: 2024-08-20 | Stop reason: HOSPADM

## 2024-08-06 RX ORDER — ASCORBIC ACID 500 MG
500 TABLET ORAL DAILY
Status: DISCONTINUED | OUTPATIENT
Start: 2024-08-07 | End: 2024-08-20 | Stop reason: HOSPADM

## 2024-08-06 RX ORDER — ALBUTEROL SULFATE 90 UG/1
2 AEROSOL, METERED RESPIRATORY (INHALATION) EVERY 6 HOURS PRN
Status: DISCONTINUED | OUTPATIENT
Start: 2024-08-06 | End: 2024-08-20 | Stop reason: HOSPADM

## 2024-08-06 RX ORDER — FUROSEMIDE 10 MG/ML
40 INJECTION INTRAMUSCULAR; INTRAVENOUS
Status: DISCONTINUED | OUTPATIENT
Start: 2024-08-06 | End: 2024-08-07

## 2024-08-06 RX ADMIN — FLECAINIDE ACETATE 150 MG: 50 TABLET ORAL at 18:33

## 2024-08-06 RX ADMIN — BENZONATATE 100 MG: 100 CAPSULE ORAL at 18:33

## 2024-08-06 RX ADMIN — EZETIMIBE 10 MG: 10 TABLET ORAL at 21:06

## 2024-08-06 RX ADMIN — ALBUTEROL SULFATE 2 PUFF: 90 AEROSOL, METERED RESPIRATORY (INHALATION) at 19:14

## 2024-08-06 RX ADMIN — ALBUTEROL SULFATE 2.5 MG: 2.5 SOLUTION RESPIRATORY (INHALATION) at 19:15

## 2024-08-06 RX ADMIN — FUROSEMIDE 40 MG: 10 INJECTION, SOLUTION INTRAMUSCULAR; INTRAVENOUS at 20:15

## 2024-08-06 RX ADMIN — FUROSEMIDE 40 MG: 10 INJECTION, SOLUTION INTRAMUSCULAR; INTRAVENOUS at 15:47

## 2024-08-06 RX ADMIN — APIXABAN 5 MG: 5 TABLET, FILM COATED ORAL at 18:34

## 2024-08-06 RX ADMIN — GUAIFENESIN AND DEXTROMETHORPHAN 10 ML: 100; 10 SYRUP ORAL at 21:06

## 2024-08-06 RX ADMIN — FAMOTIDINE 20 MG: 20 TABLET, FILM COATED ORAL at 18:33

## 2024-08-06 RX ADMIN — METOPROLOL SUCCINATE 75 MG: 50 TABLET, EXTENDED RELEASE ORAL at 18:33

## 2024-08-06 RX ADMIN — ROPINIROLE HYDROCHLORIDE 2 MG: 1 TABLET, FILM COATED ORAL at 21:05

## 2024-08-06 RX ADMIN — ZOLPIDEM TARTRATE 10 MG: 5 TABLET, COATED ORAL at 21:06

## 2024-08-06 RX ADMIN — GABAPENTIN 300 MG: 300 CAPSULE ORAL at 21:06

## 2024-08-06 NOTE — ED PROVIDER NOTES
History  Chief Complaint   Patient presents with    Shortness of Breath     Pt presents from [facility] with cough, increased SOB. Is chronically on 2L and was in high 80s with her 2LNC. Has h/o CHF. Also reports h/o ascites, feels more bloated. Last paracentesis 1 week ago        Patient is an 79-year-old female, with a history significant for A-fib anticoagulated on Eliquis (taking consistently) and CHF per [facility] medical record, who presents to the ED today, via EMS, for evaluation of hypoxia.  Patient states that, over the last 2 to 3 days, she has developed gradual onset, constant, progressively worsening dyspnea with associated dry cough.  Patient states she has been sleeping and elevating her torso higher than usual to try and remit her symptoms.  She is at baseline on 2 L O2 supplemental O2 and, because of hypoxia to the 80s, was bumped up to 4.  Per history provided by EMS, patient was on albuterol at the facility when they went to get her but they discontinued this due to no wheezing heard on pulmonary auscultation.  There is no associated fever, chest pain, abdominal pain, dysuria, weakness, numbness.  Exertion/talking exacerbate her symptoms.  No known sick contacts.  Patient arrives with prehospital documentation saying that she is DNR and patient confirms that she is DNR/DNI.  Patient is without other concerns at this time.    Wt Readings from Last 3 Encounters:  07/29/24 : 72 kg (158 lb 12.8 oz)  07/28/24 : 71.9 kg (158 lb 8.2 oz)  07/22/24 : 72.5 kg (159 lb 12.8 oz)           Prior to Admission Medications   Prescriptions Last Dose Informant Patient Reported? Taking?   Cetirizine HCl (ZyrTEC Allergy) 10 MG CAPS  Self Yes No   Sig: Take 10 mg by mouth in the morning   Cholecalciferol 50 MCG (2000 UT) CAPS  Self Yes No   Sig: Take 2,000 Units by mouth 2 (two) times a day   Chromium Picolinate (CHROMIUM PICOLATE PO)  Self Yes No   Sig: Take 1 tablet by mouth daily   Insulin Glargine Solostar (Lantus  SoloStar) 100 UNIT/ML SOPN  Self No No   Sig: Inject 0.1 mL (10 Units total) under the skin daily at bedtime   Insulin Pen Needle 31G X 4 MM MISC  Self No No   Sig: Use daily at bedtime   OneTouch Delica Lancets 33G MISC  Self No No   Sig: Check blood sugars once daily. Please substitute with appropriate alternative as covered by patient's insurance. Dx: E11.65   TURMERIC PO  Self Yes No   Sig: Take 1 capsule by mouth 2 (two) times a day   albuterol (2.5 mg/3 mL) 0.083 % nebulizer solution   No No   Sig: Take 3 mL (2.5 mg total) by nebulization 4 (four) times a day   albuterol (ProAir HFA) 90 mcg/act inhaler  Self No No   Sig: Inhale 2 puffs every 6 (six) hours as needed for wheezing   apixaban (ELIQUIS) 5 mg  Self No No   Sig: Take 1 tablet (5 mg total) by mouth 2 (two) times a day   ascorbic Acid (VITAMIN C) 500 MG CPCR  Self Yes No   Sig: Take 500 mg by mouth daily   benzonatate (TESSALON PERLES) 100 mg capsule  Self No No   Sig: Take 1 capsule (100 mg total) by mouth 3 (three) times a day as needed for cough   dapagliflozin 5 MG TABS  Self No No   Sig: Take 1 tablet (5 mg total) by mouth daily   diltiazem (CARDIZEM CD) 120 mg 24 hr capsule  Self Yes No   Sig: Take 120 mg by mouth daily   ezetimibe (ZETIA) 10 mg tablet  Self No No   Sig: Take 1 tablet (10 mg total) by mouth daily at bedtime   famotidine (PEPCID) 20 mg tablet  Self Yes No   Sig: Take 20 mg by mouth 2 (two) times a day M, W, F in the AM   flecainide (TAMBOCOR) 150 MG tablet   No No   Sig: Take 1 tablet (150 mg total) by mouth 2 (two) times a day   gabapentin (NEURONTIN) 300 mg capsule  Self No No   Sig: take 1 capsule by mouth at bedtime   glucose blood (OneTouch Verio) test strip  Self No No   Sig: Check blood sugars once daily. Please substitute with appropriate alternative as covered by patient's insurance. Dx: E11.65   ipratropium (ATROVENT) 0.03 % nasal spray  Self No No   Si sprays into each nostril 3 (three) times a day Begin once per  day, then progress to twice per day. Progress to three times a day as tolerated.   Patient taking differently: 2 sprays into each nostril if needed Begin once per day, then progress to twice per day. Progress to three times a day as tolerated.   metoprolol succinate (TOPROL-XL) 25 mg 24 hr tablet   No No   Sig: take 3 tablets by mouth every 12 hours   rOPINIRole (REQUIP) 1 mg tablet  Self No No   Sig: Take 2 tablets (2 mg total) by mouth daily at bedtime   tiotropium (Spiriva Respimat) 2.5 MCG/ACT AERS inhaler  Self No No   Sig: Inhale 2 puffs daily   torsemide (DEMADEX) 10 mg tablet   No No   Sig: Take 1 tablet (10 mg total) by mouth daily   zolpidem (AMBIEN) 10 mg tablet   No No   Sig: Take 1 tablet (10 mg total) by mouth daily at bedtime      Facility-Administered Medications: None       Past Medical History:   Diagnosis Date    Actinic keratosis     last assessed - 06Jun2014    Arthritis     Atrial fibrillation with rapid ventricular response (HCC)     last assessed - 26Apr2016    Atrial fibrillation with rapid ventricular response (HCC) 04/19/2016    Basal cell carcinoma     Benign colon polyp     last assessed - 27Apr2015    Bronchiectasis without complication (HCC)     CHF (congestive heart failure) (HCC) 06/2022    Chronic diastolic CHF (congestive heart failure) (HCC)     Disease of thyroid gland     Effusion of knee joint right     Resolved - 19Apr2016    Esophageal reflux     Fibromyalgia     last assessed - [id number]    Fibromyalgia, primary     GERD (gastroesophageal reflux disease)     Hyperlipidemia     Hypertension     Hyponatremia     Malignant neoplasm of thyroid gland (HCC)     Meningioma (HCC)     MGUS (monoclonal gammopathy of unknown significance)     Palpitations     last assessed - 30Apr2013    Peroneal tendonitis, right     last assessed - [id number]    Right lumbar radiculopathy 03/17/2016    Thyroid cancer (HCC)     Thyroid nodule     Trochanteric bursitis of left hip 03/09/2018       Past  Surgical History:   Procedure Laterality Date    CARDIAC ELECTROPHYSIOLOGY STUDY AND ABLATION  08/2022    CATARACT EXTRACTION Bilateral     COLONOSCOPY  03/2018    COLONOSCOPY W/ POLYPECTOMY  2021    repeat in 5 years    EYE SURGERY      IR THORACENTESIS  7/27/2024    OOPHORECTOMY      NC NEUROPLASTY &/TRANSPOS MEDIAN NRV CARPAL TUNNE Right 11/14/2019    Procedure: RELEASE CARPAL TUNNEL;  Surgeon: Onesimo Tate MD;  Location: [facility];  Service: Orthopedics    NC TOTAL THYROID LOBECTOMY UNI W/WO ISTHMUSECTOMY Left 12/16/2020    Procedure: Left THYROID LOBECTOMY;  Surgeon: Jhony Bhatt MD;  Location: [facility];  Service: ENT    RECTAL POLYPECTOMY      REPLACEMENT TOTAL KNEE Left     last assessed - 27Apr2015    TONSILLECTOMY      TOTAL ABDOMINAL HYSTERECTOMY      TUBAL LIGATION      US GUIDED THYROID BIOPSY  10/14/2020       Family History   Problem Relation Age of Onset    Heart disease Mother     Diabetes Mother     Heart disease Father     Coronary artery disease Father     Stroke Father         cerebrovascular accident    Heart attack Father         myocardial infarction    Sudden death Father         scd    Other Family         Back disorder    Coronary artery disease Family     Neuropathy Family     Osteoporosis Family     No Known Problems Daughter     No Known Problems Maternal Grandmother     No Known Problems Maternal Grandfather     No Known Problems Paternal Grandmother     No Known Problems Paternal Grandfather     Cancer Maternal Uncle     Breast cancer Maternal Aunt 65    No Known Problems Son     No Known Problems Maternal Aunt     No Known Problems Maternal Aunt     No Known Problems Maternal Aunt     No Known Problems Paternal Aunt     No Known Problems Paternal Aunt     Anuerysm Neg Hx     Clotting disorder Neg Hx     Arrhythmia Neg Hx     Heart failure Neg Hx      I have reviewed and agree with the history as documented.    E-Cigarette/Vaping    E-Cigarette Use Never User       E-Cigarette/Vaping Substances    Nicotine No     THC No     CBD No     Flavoring No     Other No     Unknown No      Social History     Tobacco Use    Smoking status: Never    Smokeless tobacco: Never   Vaping Use    Vaping status: Never Used   Substance Use Topics    Alcohol use: Not Currently    Drug use: Never       Review of Systems   Constitutional:  Negative for fever.   HENT:  Negative for trouble swallowing.    Eyes:  Negative for visual disturbance.   Respiratory:  Positive for cough and shortness of breath.    Cardiovascular:  Negative for chest pain.   Gastrointestinal:  Negative for abdominal pain.   Endocrine: Negative for polyuria.   Genitourinary:  Negative for dysuria.   Musculoskeletal:  Negative for gait problem.   Skin:  Negative for rash.   Allergic/Immunologic: Positive for environmental allergies.   Neurological:  Negative for weakness and numbness.   Hematological:  Negative for adenopathy.   Psychiatric/Behavioral:  Negative for confusion.    All other systems reviewed and are negative.      Physical Exam  Physical Exam  Vitals and nursing note reviewed.   Constitutional:       General: She is not in acute distress.     Appearance: She is not toxic-appearing.   HENT:      Head: Normocephalic and atraumatic.      Right Ear: External ear normal.      Left Ear: External ear normal.      Nose: Nose normal. No rhinorrhea.      Mouth/Throat:      Mouth: Mucous membranes are moist.      Pharynx: Oropharynx is clear. No oropharyngeal exudate or posterior oropharyngeal erythema.      Comments: Uvula midline. No oropharyngeal or submandibular mass/swelling  Eyes:      General: No scleral icterus.        Left eye: No discharge.      Conjunctiva/sclera: Conjunctivae normal.      Pupils: Pupils are equal, round, and reactive to light.   Neck:      Comments: Patient is spontaneously rotating their neck to the left and right during the history and physical exam interaction without difficulty or apparent  discomfort    Cardiovascular:      Rate and Rhythm: Normal rate and regular rhythm.      Pulses: Normal pulses.      Heart sounds: Murmur (Faint, previously documented) heard.      No friction rub. No gallop.      Comments: 2+ Radial  Pulmonary:      Effort: Pulmonary effort is normal. No respiratory distress.      Breath sounds: No stridor. Rales (Bibasilar rales, faintly present through the bottom third of the lung field) present. No wheezing or rhonchi.   Abdominal:      General: Abdomen is flat. There is no distension.      Palpations: Abdomen is soft.      Tenderness: There is no abdominal tenderness. There is no right CVA tenderness, left CVA tenderness, guarding or rebound.   Musculoskeletal:         General: No tenderness (No pain with calf squeeze) or deformity.      Cervical back: Neck supple. No tenderness. No muscular tenderness.      Right lower leg: Edema present.      Left lower leg: Edema present.   Lymphadenopathy:      Cervical: No cervical adenopathy.   Skin:     General: Skin is warm and dry.      Capillary Refill: Capillary refill takes less than 2 seconds.   Neurological:      Mental Status: She is alert.      Comments: Patient is speaking clearly in complete sentences.  Patient is answering appropriately and able follow commands.  Patient is moving all four extremities spontaneously.  No facial droop.  Tongue midline.      Psychiatric:         Mood and Affect: Mood normal.         Behavior: Behavior normal.         Vital Signs  ED Triage Vitals [08/06/24 1352]   Temperature Pulse Respirations Blood Pressure SpO2   98.1 °F (36.7 °C) 67 20 113/55 97 %      Temp Source Heart Rate Source Patient Position - Orthostatic VS BP Location FiO2 (%)   Oral Monitor Sitting Right arm --      Pain Score       --           Vitals:    08/06/24 1352 08/06/24 1530 08/06/24 1600   BP: 113/55 119/56 122/59   Pulse: 67 60 60   Patient Position - Orthostatic VS: Sitting Sitting          Visual Acuity      ED  Medications  Medications   furosemide (LASIX) injection 40 mg (40 mg Intravenous Given 8/6/24 3637)       Diagnostic Studies  Results Reviewed       Procedure Component Value Units Date/Time    HS Troponin I 4hr [[id number]]     Lab Status: No result Specimen: Blood     HS Troponin I 2hr [[id number]] Collected: 08/06/24 1555    Lab Status: In process Specimen: Blood from Arm, Left Updated: 08/06/24 1558    COVID/FLU/RSV [[id number]]  (Normal) Collected: 08/06/24 1405    Lab Status: Final result Specimen: Nares from Nose Updated: 08/06/24 1502     SARS-CoV-2 Negative     INFLUENZA A PCR Negative     INFLUENZA B PCR Negative     RSV PCR Negative    Narrative:      FOR PEDIATRIC PATIENTS - copy/paste COVID Guidelines URL to browser: [email or web address]    SARS-CoV-2 assay is a Nucleic Acid Amplification assay intended for the  qualitative detection of nucleic acid from SARS-CoV-2 in nasopharyngeal  swabs. Results are for the presumptive identification of SARS-CoV-2 RNA.    Positive results are indicative of infection with SARS-CoV-2, the virus  causing COVID-19, but do not rule out bacterial infection or co-infection  with other viruses. Laboratories within the [place] and its  territories are required to report all positive results to the appropriate  public health authorities. Negative results do not preclude SARS-CoV-2  infection and should not be used as the sole basis for treatment or other  patient management decisions. Negative results must be combined with  clinical observations, patient history, and epidemiological information.  This test has not been FDA cleared or approved.    This test has been authorized by FDA under an Emergency Use Authorization  (EUA). This test is only authorized for the duration of time the  declaration that circumstances exist justifying the authorization of the  emergency use of an in vitro diagnostic tests for detection of  SARS-CoV-2  virus and/or diagnosis of COVID-19 infection under section 564(b)(1) of  the Act, 21 U.S.C. 360bbb-3(b)(1), unless the authorization is terminated  or revoked sooner. The test has been validated but independent review by FDA  and CLIA is pending.    Test performed using [employer] Gene[employer]pert: This RT-PCR assay targets N2,  a region unique to SARS-CoV-2. A conserved region in the E-gene was chosen  for pan-Sarbecovirus detection which includes SARS-CoV-2.    According to CMS-2020-01-R, this platform meets the definition of high-throughput technology.    Comprehensive metabolic panel [[id number]]  (Abnormal) Collected: 08/06/24 1405    Lab Status: Final result Specimen: Blood from Arm, Left Updated: 08/06/24 1446     Sodium 135 mmol/L      Potassium 4.3 mmol/L      Chloride 98 mmol/L      CO2 29 mmol/L      ANION GAP 8 mmol/L      BUN 31 mg/dL      Creatinine 1.38 mg/dL      Glucose 138 mg/dL      Calcium 9.0 mg/dL      AST 36 U/L      ALT 39 U/L      Alkaline Phosphatase 70 U/L      Total Protein 6.7 g/dL      Albumin 3.7 g/dL      Total Bilirubin 0.87 mg/dL      eGFR 36 ml/min/1.73sq m     Narrative:      National Kidney Disease Foundation guidelines for Chronic Kidney Disease (CKD):     Stage 1 with normal or high GFR (GFR > 90 mL/min/1.73 square meters)    Stage 2 Mild CKD (GFR = 60-89 mL/min/1.73 square meters)    Stage 3A Moderate CKD (GFR = 45-59 mL/min/1.73 square meters)    Stage 3B Moderate CKD (GFR = 30-44 mL/min/1.73 square meters)    Stage 4 Severe CKD (GFR = 15-29 mL/min/1.73 square meters)    Stage 5 End Stage CKD (GFR <15 mL/min/1.73 square meters)  Note: GFR calculation is accurate only with a steady state creatinine    HS Troponin 0hr (reflex protocol) [[id number]]  (Normal) Collected: 08/06/24 1405    Lab Status: Final result Specimen: Blood from Arm, Left Updated: 08/06/24 1445     hs TnI 0hr 10 ng/L     B-Type Natriuretic Peptide(BNP) [[id number]]  (Abnormal) Collected: 08/06/24 1405    Lab  Status: Final result Specimen: Blood from Arm, Left Updated: 08/06/24 1444     BNP 1,091 pg/mL     CBC and differential [[id number]] Collected: 08/06/24 1405    Lab Status: Final result Specimen: Blood from Arm, Left Updated: 08/06/24 1423     WBC 5.89 Thousand/uL      RBC 3.98 Million/uL      Hemoglobin 11.5 g/dL      Hematocrit 36.3 %      MCV 91 fL      MCH 28.9 pg      MCHC 31.7 g/dL      RDW 14.6 %      MPV 10.2 fL      Platelets 267 Thousands/uL      nRBC 0 /100 WBCs      Segmented % 67 %      Immature Grans % 0 %      Lymphocytes % 19 %      Monocytes % 10 %      Eosinophils Relative 3 %      Basophils Relative 1 %      Absolute Neutrophils 3.90 Thousands/µL      Absolute Immature Grans 0.02 Thousand/uL      Absolute Lymphocytes 1.13 Thousands/µL      Absolute Monocytes 0.61 Thousand/µL      Eosinophils Absolute 0.18 Thousand/µL      Basophils Absolute 0.05 Thousands/µL                    XR chest 2 views   ED Interpretation by Nir Nguyen MD (08/06 1431)   Per my independent interpretation: Pulmonary vascular congestion, right pleural effusion      Final Result by Grey Billingsley MD (08/06 1454)      Mild pulmonary edema, improved. Grossly stable right greater than left pleural effusions.         Workstation performed: [id number]                    Procedures  Procedures         ED Course  ED Course as of 08/06/24 1610   Tue Aug 06, 2024   1422 Per my review the medical record, patient was recently admitted for acute CHF exacerbation as well as pleural effusion believed to be secondary to CHF and she underwent thoracentesis.   1427 Hemoglobin: 11.5  WNL    1427 WBC: 5.89  WNL - Patient does not meet SIRS    1504 Creatinine(!): 1.38  Elevated, similar to prior. Not KIMBERLEY    1504 BNP(!): 1,091  Elevated but improving                                  SBIRT 22yo+      Flowsheet Row Most Recent Value   Initial Alcohol Screen: US AUDIT-C     1. How often do you have a drink containing alcohol? 0 Filed at:  08/06/2024 1529   2. How many drinks containing alcohol do you have on a typical day you are drinking?  0 Filed at: 08/06/2024 1529   3a. Male UNDER 65: How often do you have five or more drinks on one occasion? 0 Filed at: 08/06/2024 1529   3b. FEMALE Any Age, or MALE 65+: How often do you have 4 or more drinks on one occassion? 0 Filed at: 08/06/2024 1529   Audit-C Score 0 Filed at: 08/06/2024 1529   TARA: How many times in the past year have you...    Used an illegal drug or used a prescription medication for non-medical reasons? Never Filed at: 08/06/2024 1529                      Medical Decision Making  Patient is an 79-year-old female, with a history significant for A-fib anticoagulated on Eliquis (taking consistently per patient) and CHF per [facility] medical record, who presents to the ED today, via EMS, for evaluation of hypoxia.  Patient states that, over the last 2 to 3 days, she has developed gradual onset, constant, progressively worsening dyspnea with associated dry cough.  Patient states she has been sleeping and elevating her torso higher than usual to try and remit her symptoms.  She is at baseline on 2 L O2 supplemental O2 and, because of hypoxia to the 80s, was bumped up to 4.  Per history provided by EMS, patient was on albuterol at the facility when they went to get her but they discontinued this due to no wheezing heard on pulmonary auscultation.  There is no associated fever, chest pain, abdominal pain, dysuria, weakness, numbness.  Exertion/talking exacerbate her symptoms.  No known sick contacts.  Patient arrives with prehospital documentation saying that she is DNR and patient confirms that she is DNR/DNI.  Patient is currently afebrile and hemodynamically stable.  Her physical exam is notable for slight cardiac murmur, but bilateral lower extremity edema, bibasilar rales, soft and nontender abdomen.  This presentation is concerning for: Acute CHF exacerbation, ACS, anemia, electrolyte  abnormality, KIMBERLEY.  Viral syndrome also considered.  Patient at risk for pneumonia.  Will investigate with cardiac workup, viral testing.  Will manage based on workup    Amount and/or Complexity of Data Reviewed  Labs: ordered. Decision-making details documented in ED Course.  Radiology: ordered and independent interpretation performed.    Risk  Prescription drug management.  Decision regarding hospitalization.                 Disposition  Final diagnoses:   Acute exacerbation of CHF (congestive heart failure) (HCC)   Hypoxia   Elevated serum creatinine     Time reflects when diagnosis was documented in both MDM as applicable and the Disposition within this note       Time User Action Codes Description Comment    8/6/2024  4:07 PM Nir Nguyen [I50.9] Acute exacerbation of CHF (congestive heart failure) (HCC)     8/6/2024  4:07 PM Nir Nguyen [R09.02] Hypoxia     8/6/2024  4:07 PM Nir Nguyen [R79.89] Elevated serum creatinine           ED Disposition       ED Disposition   Admit    Condition   Stable    Date/Time   Tue Aug 6, 2024 1607    Comment   Case was discussed with FLAKITA and the patient's admission status was agreed to be Admission Status: inpatient status to the service of Dr. Cui .               Follow-up Information    None         Patient's Medications   Discharge Prescriptions    No medications on file       No discharge procedures on file.    PDMP Review         Value Time User    PDMP Reviewed  Yes 7/29/2024  7:19 PM JENNY Rojas            ED Provider  Electronically Signed by             Nir Nguyen MD  08/06/24 3226

## 2024-08-06 NOTE — ASSESSMENT & PLAN NOTE
Afib-  S/p ablation in 03/2021.   EKG- AV dual-paced rhythm, Vent. rate has decreased by 14 BPM since prior EKG  Low voltage QRS    Plan:  Eliquis 5 mg twice daily  Continue flecainide 150 mg twice daily and metoprolol succinate 75 mg twice daily  Continue diltiazem 120 mg daily

## 2024-08-06 NOTE — H&P
[facility]  H&P  Name: Farnaz Martin 79 y.o. female I MRN: [id number]  Unit/Bed#: ED-27 I Date of Admission: 8/6/2024   Date of Service: 8/6/2024 I Hospital Day: 0      Assessment & Plan   * Acute on chronic heart failure (HCC)  Assessment & Plan    Lab Results   Component Value Date    LVEF 50 07/14/2024    BNP 1,091 (H) 08/06/2024    BNP 1,428 (H) 07/24/2024       Presents with increased shortness of breath, dyspnea on exertion, lower extremity edema, and cough with productive sputum  Patient is currently volume overloaded.  3 to 4-day history of progressively worsening dyspnea, dry cough, worsening lower extremity edema.  She required increased oxygen when EMS arrived. difficulty talking normal sentences secondary to shortness of breath.  She is now unable to lie flat.  She was discharged on recent admission with 10 mg torsemide daily  Signs and symptoms indicative of acute CHF exacerbation secondary to under diuresis.  Patient is on 2 L of oxygen, presents with increased work of breathing, dry cough, + JVD, appears volume overloaded.    Plan:  GDMT:  Diuretic: iv lasix 40mg BID, B-Blocker: Toprol  Cardiac diet, sodium restriction  CMP, magnesium tomorrow a.m; Goal Mg > 2 and K > 4; Replete prn  Daily standing weights, Measure I/O   Consult cardiology   Echo 50% LVEF     Wt Readings from Last 3 Encounters:   08/06/24 75.9 kg (167 lb 6.4 oz)   07/29/24 72 kg (158 lb 12.8 oz)   07/28/24 71.9 kg (158 lb 8.2 oz)               Elevated serum creatinine  Assessment & Plan  Recent Labs     08/06/24  1405   CREATININE 1.38*   EGFR 36     Estimated Creatinine Clearance: 31 mL/min (A) (by C-G formula based on SCr of 1.38 mg/dL (H)).    POA 1.38; (baseline 1.1-1.28)  Likely secondary to fluid overload due to being under diuresed at home with 10 mg torsemide daily.    Plan:  IV Lasix 40 mg twice daily  Monitor BMP, IV fluids held  Avoid hypoperfusion of the kidneys, minimize  nephrotoxins    Cough  Assessment & Plan  Pt endorsing orthopnea, SOB  Productive in nature without production of sputum  Uses Spiriva inhaler and Robitussin at home for sx's  R/p chest x-ray shows increased congestion.   Cough component likely CHF exacerbation    Plan:  Atrovent neb four times daily    Paroxysmal A-fib (HCC)  Assessment & Plan  Afib-  S/p ablation in 03/2021.   EKG- AV dual-paced rhythm, Vent. rate has decreased by 14 BPM since prior EKG  Low voltage QRS    Plan:  Eliquis 5 mg twice daily  Continue flecainide 150 mg twice daily and metoprolol succinate 75 mg twice daily  Continue diltiazem 120 mg daily           VTE Pharmacologic Prophylaxis: VTE Score: 5 High Risk (Score >/= 5) - Pharmacological DVT Prophylaxis Ordered: apixaban (Eliquis). Sequential Compression Devices Ordered.  Code Status: Level 3 - DNAR and DNI   Discussion with family: Updated  (daughter) via phone.    Anticipated Length of Stay: Patient will be admitted on an inpatient basis with an anticipated length of stay of greater than 2 midnights secondary to acute on chronic CHF exacerbation.    Chief Complaint: Shortness of breath    History of Present Illness:  Farnaz Martin is a 79 y.o. female with a PMH of A-fib on Eliquis, CHF presented to the ED via EMS for shortness of breath and hypoxia.  Over the last 3 days, patient noted worsening dyspnea as well as dry cough.  Patient was recently admitted 1 week ago for fatigue and dizziness, and was discharged on 10 mg torsemide daily.  Along with the symptoms noted above, patient has reported needing to be elevated when sleeping at night as she is not able to breathe.  She denies any infectious symptoms as well as no recent sick contacts.  Upon examination with patient during encounter.  Patient appeared to be having increased work of breathing while having conversation.  Upon chart review, patient reports gaining 9 pounds since prior to discharge.  Her dry weight is  72 kg (158 pounds 12.8 ounces).  Today's weight is 75.9 (167 pounds 6.4 ounces).      In ED, chest x-ray was done which showed Mild pulmonary edema, improved. Grossly stable right greater than left pleural effusions.  EKG showed AV dual paced rhythm compared to prior EKG last week.  Labs remarkable for creatinine 1.38, BNP 1,091, remaining labs unremarkable.  Patient was given 1 dose of IV Lasix 40 mg in the ED and then admitted to [facility] service.    Review of Systems:  Review of Systems   Constitutional:  Negative for chills and fever.   HENT:  Negative for ear pain, sore throat and trouble swallowing.    Eyes:  Negative for pain and visual disturbance.   Respiratory:  Positive for cough and shortness of breath. Negative for choking and wheezing.    Cardiovascular:  Positive for leg swelling. Negative for chest pain and palpitations.   Gastrointestinal:  Negative for abdominal pain and vomiting.   Genitourinary:  Negative for dysuria and hematuria.   Musculoskeletal:  Negative for arthralgias and back pain.   Skin:  Negative for color change and rash.   Neurological:  Negative for seizures and syncope.   All other systems reviewed and are negative.      Past Medical and Surgical History:   Past Medical History:   Diagnosis Date    Actinic keratosis     last assessed - 06Jun2014    Arthritis     Atrial fibrillation with rapid ventricular response (HCC)     last assessed - 26Apr2016    Atrial fibrillation with rapid ventricular response (HCC) 04/19/2016    Basal cell carcinoma     Benign colon polyp     last assessed - 27Apr2015    Bronchiectasis without complication (HCC)     CHF (congestive heart failure) (HCC) 06/2022    Chronic diastolic CHF (congestive heart failure) (HCC)     Disease of thyroid gland     Effusion of knee joint right     Resolved - 19Apr2016    Esophageal reflux     Fibromyalgia     last assessed - 27Dec2017    Fibromyalgia, primary     GERD (gastroesophageal reflux disease)     Hyperlipidemia      Hypertension     Hyponatremia     Malignant neoplasm of thyroid gland (HCC)     Meningioma (HCC)     MGUS (monoclonal gammopathy of unknown significance)     Palpitations     last assessed - [id number]    Peroneal tendonitis, right     last assessed - [id number]    Right lumbar radiculopathy 03/17/2016    Thyroid cancer (HCC)     Thyroid nodule     Trochanteric bursitis of left hip 03/09/2018       Past Surgical History:   Procedure Laterality Date    CARDIAC ELECTROPHYSIOLOGY STUDY AND ABLATION  08/2022    CATARACT EXTRACTION Bilateral     COLONOSCOPY  03/2018    COLONOSCOPY W/ POLYPECTOMY  2021    repeat in 5 years    EYE SURGERY      IR THORACENTESIS  7/27/2024    OOPHORECTOMY      AK NEUROPLASTY &/TRANSPOS MEDIAN NRV CARPAL TUNNE Right 11/14/2019    Procedure: RELEASE CARPAL TUNNEL;  Surgeon: Onesimo Tate MD;  Location: [facility];  Service: Orthopedics    AK TOTAL THYROID LOBECTOMY UNI W/WO ISTHMUSECTOMY Left 12/16/2020    Procedure: Left THYROID LOBECTOMY;  Surgeon: Jhony Bhatt MD;  Location: [facility];  Service: ENT    RECTAL POLYPECTOMY      REPLACEMENT TOTAL KNEE Left     last assessed - 27Apr2015    TONSILLECTOMY      TOTAL ABDOMINAL HYSTERECTOMY      TUBAL LIGATION      US GUIDED THYROID BIOPSY  10/14/2020       Meds/Allergies:  Prior to Admission medications    Medication Sig Start Date End Date Taking? Authorizing Provider   albuterol (2.5 mg/3 mL) 0.083 % nebulizer solution Take 3 mL (2.5 mg total) by nebulization 4 (four) times a day 8/5/24  Yes JENNY Rojas   albuterol (ProAir HFA) 90 mcg/act inhaler Inhale 2 puffs every 6 (six) hours as needed for wheezing 1/30/24  Yes Anabella Dougherty MD   apixaban (ELIQUIS) 5 mg Take 1 tablet (5 mg total) by mouth 2 (two) times a day 1/17/24  Yes Nir Cunningham DO   ascorbic Acid (VITAMIN C) 500 MG CPCR Take 500 mg by mouth daily   Yes Historical Provider, MD   benzonatate (TESSALON PERLES) 100 mg capsule Take 1 capsule (100 mg total)  by mouth 3 (three) times a day as needed for cough 6/15/24  Yes JENNY Enriquez   Cetirizine HCl (ZyrTEC Allergy) 10 MG CAPS Take 10 mg by mouth in the morning   Yes Historical Provider, MD   Cholecalciferol 50 MCG (2000 UT) CAPS Take 2,000 Units by mouth 2 (two) times a day   Yes Historical Provider, MD   dapagliflozin 5 MG TABS Take 1 tablet (5 mg total) by mouth daily 5/31/24 11/27/24 Yes Naveen Monsivais MD   diltiazem (CARDIZEM CD) 120 mg 24 hr capsule Take 120 mg by mouth daily   Yes Historical Provider, MD   ezetimibe (ZETIA) 10 mg tablet Take 1 tablet (10 mg total) by mouth daily at bedtime 5/27/24  Yes Kyle Brunner, MD   famotidine (PEPCID) 20 mg tablet Take 20 mg by mouth 2 (two) times a day M, W, F in the AM   Yes Historical Provider, MD   flecainide (TAMBOCOR) 150 MG tablet Take 1 tablet (150 mg total) by mouth 2 (two) times a day 7/29/24  Yes Steve Tripathi MD   gabapentin (NEURONTIN) 300 mg capsule take 1 capsule by mouth at bedtime 2/18/24  Yes Naveen Monsivais MD   glucose blood (OneTouch Verio) test strip Check blood sugars once daily. Please substitute with appropriate alternative as covered by patient's insurance. Dx: E11.65 7/11/24  Yes Naveen Monsivais MD   Insulin Glargine Solostar (Lantus SoloStar) 100 UNIT/ML SOPN Inject 0.1 mL (10 Units total) under the skin daily at bedtime 7/11/24 5/7/25 Yes Naveen Monsivais MD   Insulin Pen Needle 31G X 4 MM MISC Use daily at bedtime 7/11/24  Yes Naveen Monsivais MD   ipratropium (ATROVENT) 0.03 % nasal spray 2 sprays into each nostril 3 (three) times a day Begin once per day, then progress to twice per day. Progress to three times a day as tolerated.  Patient taking differently: 2 sprays into each nostril if needed Begin once per day, then progress to twice per day. Progress to three times a day as tolerated. 1/25/24  Yes Anabella Dougherty MD   metoprolol succinate (TOPROL-XL) 25 mg 24 hr tablet take 3 tablets by mouth every 12 hours 7/23/24  Yes  Elise Ulloa, PA-C   OneTouch Delica Lancets 33G MISC Check blood sugars once daily. Please substitute with appropriate alternative as covered by patient's insurance. Dx: E11.65 7/11/24  Yes Naveen Monsivais MD   rOPINIRole (REQUIP) 1 mg tablet Take 2 tablets (2 mg total) by mouth daily at bedtime 5/6/24  Yes Apple Lobato MD   tiotropium (Spiriva Respimat) 2.5 MCG/ACT AERS inhaler Inhale 2 puffs daily 6/14/24  Yes JENNY Enriquez   torsemide (DEMADEX) 10 mg tablet Take 1 tablet (10 mg total) by mouth daily 7/30/24  Yes Steve Tripathi MD   zolpidem (AMBIEN) 10 mg tablet Take 1 tablet (10 mg total) by mouth daily at bedtime 7/29/24  Yes JENNY Rojas   TURMERIC PO Take 1 capsule by mouth 2 (two) times a day  Patient not taking: Reported on 8/6/2024    Historical Provider, MD   Chromium Picolinate (CHROMIUM PICOLATE PO) Take 1 tablet by mouth daily  Patient not taking: Reported on 8/6/2024 8/6/24  Historical Provider, MD     I have reviewed home medications with patient personally.    Allergies:   Allergies   Allergen Reactions    Sotalol Other (See Comments)     Prolonged QTc leading to torsades de pointes     Penicillins Other (See Comments)     As a child calcium deposit in the arm     Ace Inhibitors GI Intolerance     Did feel well on it    Omeprazole Abdominal Pain, Rash and Vomiting     stomach pain, vomiting, rash       Social History:  Marital Status:    Occupation: Retired  Patient Pre-hospital Living Situation: Skilled Nursing Facility: [facility]  Patient Pre-hospital Level of Mobility: walks with walker  Patient Pre-hospital Diet Restrictions: She reports regular diet  Substance Use History:   Social History     Substance and Sexual Activity   Alcohol Use Not Currently     Social History     Tobacco Use   Smoking Status Never   Smokeless Tobacco Never     Social History     Substance and Sexual Activity   Drug Use Never       Family History:  Family History   Problem  Relation Age of Onset    Heart disease Mother     Diabetes Mother     Heart disease Father     Coronary artery disease Father     Stroke Father         cerebrovascular accident    Heart attack Father         myocardial infarction    Sudden death Father         scd    Other Family         Back disorder    Coronary artery disease Family     Neuropathy Family     Osteoporosis Family     No Known Problems Daughter     No Known Problems Maternal Grandmother     No Known Problems Maternal Grandfather     No Known Problems Paternal Grandmother     No Known Problems Paternal Grandfather     Cancer Maternal Uncle     Breast cancer Maternal Aunt 65    No Known Problems Son     No Known Problems Maternal Aunt     No Known Problems Maternal Aunt     No Known Problems Maternal Aunt     No Known Problems Paternal Aunt     No Known Problems Paternal Aunt     Anuerysm Neg Hx     Clotting disorder Neg Hx     Arrhythmia Neg Hx     Heart failure Neg Hx        Physical Exam:     Vitals:   Blood Pressure: 138/63 (08/06/24 1830)  Pulse: 60 (08/06/24 1830)  Temperature: 98.1 °F (36.7 °C) (08/06/24 1352)  Temp Source: Oral (08/06/24 1352)  Respirations: 16 (08/06/24 1830)  Weight - Scale: 75.9 kg (167 lb 6.4 oz) (08/06/24 1521)  SpO2: 97 % (08/06/24 1830)    Physical Exam  Vitals and nursing note reviewed.   Constitutional:       General: She is not in acute distress.     Appearance: She is well-developed.      Comments: On 2 L of oxygen   HENT:      Head: Normocephalic and atraumatic.   Eyes:      Conjunctiva/sclera: Conjunctivae normal.   Cardiovascular:      Rate and Rhythm: Normal rate and regular rhythm.      Pulses: Normal pulses.      Heart sounds: No murmur heard.     Comments: JVD present  Pulmonary:      Effort: Pulmonary effort is normal. No respiratory distress.      Breath sounds: Rales present.   Abdominal:      Palpations: Abdomen is soft.      Tenderness: There is no abdominal tenderness.   Musculoskeletal:         General:  No swelling.      Cervical back: Neck supple.      Right lower leg: Edema present.      Left lower leg: Edema present.   Skin:     General: Skin is warm and dry.      Capillary Refill: Capillary refill takes less than 2 seconds.   Neurological:      Mental Status: She is alert.   Psychiatric:         Mood and Affect: Mood normal.          Additional Data:     Lab Results:  Results from last 7 days   Lab Units 08/06/24  1405   WBC Thousand/uL 5.89   HEMOGLOBIN g/dL 11.5   HEMATOCRIT % 36.3   PLATELETS Thousands/uL 267   SEGS PCT % 67   LYMPHO PCT % 19   MONO PCT % 10   EOS PCT % 3     Results from last 7 days   Lab Units 08/06/24  1405   SODIUM mmol/L 135   POTASSIUM mmol/L 4.3   CHLORIDE mmol/L 98   CO2 mmol/L 29   BUN mg/dL 31*   CREATININE mg/dL 1.38*   ANION GAP mmol/L 8   CALCIUM mg/dL 9.0   ALBUMIN g/dL 3.7   TOTAL BILIRUBIN mg/dL 0.87   ALK PHOS U/L 70   ALT U/L 39   AST U/L 36   GLUCOSE RANDOM mg/dL 138             Lab Results   Component Value Date    HGBA1C 8.8 (H) 07/13/2024    HGBA1C 8.8 (H) 06/28/2024    HGBA1C 8.9 (H) 05/07/2024           Lines/Drains:  Invasive Devices       Peripheral Intravenous Line  Duration             Peripheral IV 08/06/24 Left Antecubital <1 day                        Imaging: Reviewed radiology reports from this admission including: chest xray  XR chest 2 views   ED Interpretation by Nir Nguyen MD (08/06 1991)   Per my independent interpretation: Pulmonary vascular congestion, right pleural effusion      Final Result by Grey iBllingsley MD (08/06 5144)      Mild pulmonary edema, improved. Grossly stable right greater than left pleural effusions.         Workstation performed: [id number]             EKG and Other Studies Reviewed on Admission:   EKG: AV Dual Paced rhythm at 62 bpm    ** Please Note: This note has been constructed using a voice recognition system. **

## 2024-08-06 NOTE — ASSESSMENT & PLAN NOTE
Recent Labs     08/06/24  1405   CREATININE 1.38*   EGFR 36     Estimated Creatinine Clearance: 31 mL/min (A) (by C-G formula based on SCr of 1.38 mg/dL (H)).    POA 1.38; (baseline 1.1-1.28)  Likely secondary to fluid overload due to being under diuresed at home with 10 mg torsemide daily.    Plan:  IV Lasix 40 mg twice daily  Monitor BMP, IV fluids held  Avoid hypoperfusion of the kidneys, minimize nephrotoxins

## 2024-08-06 NOTE — ASSESSMENT & PLAN NOTE
Pt endorsing orthopnea, SOB  Productive in nature without production of sputum  Uses Spiriva inhaler and Robitussin at home for sx's  R/p chest x-ray shows increased congestion.   Cough component likely CHF exacerbation    Plan:  Atrovent neb four times daily

## 2024-08-06 NOTE — ASSESSMENT & PLAN NOTE
Lab Results   Component Value Date    LVEF 50 07/14/2024    BNP 1,091 (H) 08/06/2024    BNP 1,428 (H) 07/24/2024       Presents with increased shortness of breath, dyspnea on exertion, lower extremity edema, and cough with productive sputum  Patient is currently volume overloaded.  3 to 4-day history of progressively worsening dyspnea, dry cough, worsening lower extremity edema.  She required increased oxygen when EMS arrived. difficulty talking normal sentences secondary to shortness of breath.  She is now unable to lie flat.  She was discharged on recent admission with 10 mg torsemide daily  Signs and symptoms indicative of acute CHF exacerbation secondary to under diuresis.  Patient is on 2 L of oxygen, presents with increased work of breathing, dry cough, + JVD, appears volume overloaded.    Plan:  GDMT:  Diuretic: iv lasix 40mg BID, B-Blocker: Toprol  Cardiac diet, sodium restriction  CMP, magnesium tomorrow a.m; Goal Mg > 2 and K > 4; Replete prn  Daily standing weights, Measure I/O   Consult cardiology   Echo 50% LVEF     Wt Readings from Last 3 Encounters:   08/06/24 75.9 kg (167 lb 6.4 oz)   07/29/24 72 kg (158 lb 12.8 oz)   07/28/24 71.9 kg (158 lb 8.2 oz)

## 2024-08-07 ENCOUNTER — PATIENT OUTREACH (OUTPATIENT)
Dept: CASE MANAGEMENT | Facility: OTHER | Age: 79
End: 2024-08-07

## 2024-08-07 LAB
ANION GAP SERPL CALCULATED.3IONS-SCNC: 8 MMOL/L (ref 4–13)
BASOPHILS # BLD AUTO: 0.05 THOUSANDS/ÂΜL (ref 0–0.1)
BASOPHILS NFR BLD AUTO: 1 % (ref 0–1)
BUN SERPL-MCNC: 31 MG/DL (ref 5–25)
CALCIUM SERPL-MCNC: 8.8 MG/DL (ref 8.4–10.2)
CHLORIDE SERPL-SCNC: 98 MMOL/L (ref 96–108)
CO2 SERPL-SCNC: 30 MMOL/L (ref 21–32)
CREAT SERPL-MCNC: 1.32 MG/DL (ref 0.6–1.3)
EOSINOPHIL # BLD AUTO: 0.25 THOUSAND/ÂΜL (ref 0–0.61)
EOSINOPHIL NFR BLD AUTO: 4 % (ref 0–6)
ERYTHROCYTE [DISTWIDTH] IN BLOOD BY AUTOMATED COUNT: 14.5 % (ref 11.6–15.1)
GFR SERPL CREATININE-BSD FRML MDRD: 38 ML/MIN/1.73SQ M
GLUCOSE SERPL-MCNC: 137 MG/DL (ref 65–140)
GLUCOSE SERPL-MCNC: 155 MG/DL (ref 65–140)
GLUCOSE SERPL-MCNC: 184 MG/DL (ref 65–140)
HCT VFR BLD AUTO: 33.3 % (ref 34.8–46.1)
HGB BLD-MCNC: 10.7 G/DL (ref 11.5–15.4)
IMM GRANULOCYTES # BLD AUTO: 0.02 THOUSAND/UL (ref 0–0.2)
IMM GRANULOCYTES NFR BLD AUTO: 0 % (ref 0–2)
LYMPHOCYTES # BLD AUTO: 1.5 THOUSANDS/ÂΜL (ref 0.6–4.47)
LYMPHOCYTES NFR BLD AUTO: 26 % (ref 14–44)
MCH RBC QN AUTO: 29.2 PG (ref 26.8–34.3)
MCHC RBC AUTO-ENTMCNC: 32.1 G/DL (ref 31.4–37.4)
MCV RBC AUTO: 91 FL (ref 82–98)
MONOCYTES # BLD AUTO: 0.72 THOUSAND/ÂΜL (ref 0.17–1.22)
MONOCYTES NFR BLD AUTO: 13 % (ref 4–12)
NEUTROPHILS # BLD AUTO: 3.16 THOUSANDS/ÂΜL (ref 1.85–7.62)
NEUTS SEG NFR BLD AUTO: 56 % (ref 43–75)
NRBC BLD AUTO-RTO: 0 /100 WBCS
PLATELET # BLD AUTO: 238 THOUSANDS/UL (ref 149–390)
PMV BLD AUTO: 10.1 FL (ref 8.9–12.7)
POTASSIUM SERPL-SCNC: 3.5 MMOL/L (ref 3.5–5.3)
RBC # BLD AUTO: 3.66 MILLION/UL (ref 3.81–5.12)
SODIUM SERPL-SCNC: 136 MMOL/L (ref 135–147)
WBC # BLD AUTO: 5.7 THOUSAND/UL (ref 4.31–10.16)

## 2024-08-07 PROCEDURE — 99223 1ST HOSP IP/OBS HIGH 75: CPT | Performed by: INTERNAL MEDICINE

## 2024-08-07 PROCEDURE — 94640 AIRWAY INHALATION TREATMENT: CPT

## 2024-08-07 PROCEDURE — 80048 BASIC METABOLIC PNL TOTAL CA: CPT

## 2024-08-07 PROCEDURE — 94760 N-INVAS EAR/PLS OXIMETRY 1: CPT

## 2024-08-07 PROCEDURE — 85025 COMPLETE CBC W/AUTO DIFF WBC: CPT

## 2024-08-07 PROCEDURE — 82948 REAGENT STRIP/BLOOD GLUCOSE: CPT

## 2024-08-07 PROCEDURE — 99222 1ST HOSP IP/OBS MODERATE 55: CPT | Performed by: INTERNAL MEDICINE

## 2024-08-07 RX ORDER — INSULIN LISPRO 100 [IU]/ML
1-5 INJECTION, SOLUTION INTRAVENOUS; SUBCUTANEOUS
Status: DISCONTINUED | OUTPATIENT
Start: 2024-08-07 | End: 2024-08-20 | Stop reason: HOSPADM

## 2024-08-07 RX ORDER — INSULIN GLARGINE 100 [IU]/ML
10 INJECTION, SOLUTION SUBCUTANEOUS
Status: DISCONTINUED | OUTPATIENT
Start: 2024-08-07 | End: 2024-08-20 | Stop reason: HOSPADM

## 2024-08-07 RX ORDER — BUMETANIDE 0.25 MG/ML
1 INJECTION INTRAMUSCULAR; INTRAVENOUS 2 TIMES DAILY
Status: DISCONTINUED | OUTPATIENT
Start: 2024-08-07 | End: 2024-08-07

## 2024-08-07 RX ORDER — BUMETANIDE 0.25 MG/ML
2 INJECTION INTRAMUSCULAR; INTRAVENOUS 2 TIMES DAILY
Status: DISCONTINUED | OUTPATIENT
Start: 2024-08-07 | End: 2024-08-07

## 2024-08-07 RX ORDER — BUMETANIDE 0.25 MG/ML
1 INJECTION INTRAMUSCULAR; INTRAVENOUS 2 TIMES DAILY
Status: DISCONTINUED | OUTPATIENT
Start: 2024-08-07 | End: 2024-08-09

## 2024-08-07 RX ORDER — ACETAMINOPHEN 325 MG/1
650 TABLET ORAL EVERY 8 HOURS PRN
Status: DISCONTINUED | OUTPATIENT
Start: 2024-08-07 | End: 2024-08-20 | Stop reason: HOSPADM

## 2024-08-07 RX ADMIN — BUMETANIDE 1 MG: 0.25 INJECTION INTRAMUSCULAR; INTRAVENOUS at 17:36

## 2024-08-07 RX ADMIN — ROPINIROLE HYDROCHLORIDE 2 MG: 1 TABLET, FILM COATED ORAL at 21:08

## 2024-08-07 RX ADMIN — GUAIFENESIN AND DEXTROMETHORPHAN 10 ML: 100; 10 SYRUP ORAL at 12:08

## 2024-08-07 RX ADMIN — METOPROLOL SUCCINATE 75 MG: 50 TABLET, EXTENDED RELEASE ORAL at 05:10

## 2024-08-07 RX ADMIN — FAMOTIDINE 20 MG: 20 TABLET, FILM COATED ORAL at 09:03

## 2024-08-07 RX ADMIN — APIXABAN 5 MG: 5 TABLET, FILM COATED ORAL at 17:36

## 2024-08-07 RX ADMIN — APIXABAN 5 MG: 5 TABLET, FILM COATED ORAL at 09:03

## 2024-08-07 RX ADMIN — FUROSEMIDE 40 MG: 10 INJECTION, SOLUTION INTRAMUSCULAR; INTRAVENOUS at 09:03

## 2024-08-07 RX ADMIN — BUMETANIDE 2 MG: 0.25 INJECTION INTRAMUSCULAR; INTRAVENOUS at 12:08

## 2024-08-07 RX ADMIN — GABAPENTIN 300 MG: 300 CAPSULE ORAL at 21:08

## 2024-08-07 RX ADMIN — FLECAINIDE ACETATE 150 MG: 50 TABLET ORAL at 09:03

## 2024-08-07 RX ADMIN — DILTIAZEM HYDROCHLORIDE 120 MG: 120 CAPSULE, COATED, EXTENDED RELEASE ORAL at 09:03

## 2024-08-07 RX ADMIN — FAMOTIDINE 20 MG: 20 TABLET, FILM COATED ORAL at 17:36

## 2024-08-07 RX ADMIN — LEVALBUTEROL HYDROCHLORIDE 1.25 MG: 1.25 SOLUTION RESPIRATORY (INHALATION) at 13:44

## 2024-08-07 RX ADMIN — METOPROLOL SUCCINATE 75 MG: 50 TABLET, EXTENDED RELEASE ORAL at 17:36

## 2024-08-07 RX ADMIN — ACETAMINOPHEN 650 MG: 325 TABLET, FILM COATED ORAL at 20:30

## 2024-08-07 RX ADMIN — OXYCODONE HYDROCHLORIDE AND ACETAMINOPHEN 500 MG: 500 TABLET ORAL at 09:03

## 2024-08-07 RX ADMIN — LEVALBUTEROL HYDROCHLORIDE 1.25 MG: 1.25 SOLUTION RESPIRATORY (INHALATION) at 19:08

## 2024-08-07 RX ADMIN — ACETAMINOPHEN 650 MG: 325 TABLET, FILM COATED ORAL at 12:27

## 2024-08-07 RX ADMIN — FLECAINIDE ACETATE 150 MG: 50 TABLET ORAL at 17:36

## 2024-08-07 RX ADMIN — LEVALBUTEROL HYDROCHLORIDE 1.25 MG: 1.25 SOLUTION RESPIRATORY (INHALATION) at 07:26

## 2024-08-07 RX ADMIN — ZOLPIDEM TARTRATE 10 MG: 5 TABLET, COATED ORAL at 21:08

## 2024-08-07 RX ADMIN — INSULIN LISPRO 1 UNITS: 100 INJECTION, SOLUTION INTRAVENOUS; SUBCUTANEOUS at 17:59

## 2024-08-07 RX ADMIN — EZETIMIBE 10 MG: 10 TABLET ORAL at 21:08

## 2024-08-07 RX ADMIN — INSULIN GLARGINE 10 UNITS: 100 INJECTION, SOLUTION SUBCUTANEOUS at 21:10

## 2024-08-07 RX ADMIN — Medication 1000 UNITS: at 09:03

## 2024-08-07 NOTE — ASSESSMENT & PLAN NOTE
Pt endorsing orthopnea, SOB  Productive in nature without production of sputum  Uses Spiriva inhaler and Robitussin at home for sx's  R/p chest x-ray shows increased congestion, persistent pleural effusion  Cough component likely CHF exacerbation    Plan:  Atrovent neb four times daily  Pulm consulted, appreciate reccs

## 2024-08-07 NOTE — CONSULTS
Cardiology   Farnaz Martin 79 y.o. female MRN: [id number]  Unit/Bed#: S -01 Encounter: [id number]      Reason for Consult / Principal Problem: CHF    Physician Requesting Consult:  Fauzia Pennington MD    Outpatient Cardiologist: Dr. Cunningham    Assessment  1. Acute on chronic HFpEF  -BNP level 1091  -Outpatient diuretic:torsemide 10 mg daily.  -Inpatient diuretic regimen; S/p (2) doses of IV lasix 40 mg thus far.   -24-hour I&O balance; -360 ml  -Weight: 167 lbs per standing scale on 8/6, 161 lbs today.   -Dry weight: recently 158-160 lbs  2. Recurrent right sided pleural effusion  -CXR in the ED demonstrated mild pulmonary edema, stable R>L pleural effusions  -S/p prior thoracentesis procedures.   3. Paroxysmal atrial fibrillation/flutter and PAT  4. SSS, PPM in situ.  -Prior atrial fibrillation/flutter ablation in the past.  -ECG on admission demonstrated AV paced rhythm.  -Device interrogation 6/17/2024; AT/AF BURDEN SINCE 5/24/24-2.4%.   -On Cardizem  mg daily, flecainide 150 mg twice daily, and metoprolol succinate 75 mg BID  -On Eliquis 5 mg BID  5. HTN  -BP last recorded at 111/76  -On Cardizem  mg daily, metoprolol succinate 75 mg BID  6. HLD  -Lipid profile 7/25/2024; cholesterol 79, triglyceride 55, HDL 28 and LDL calculated 40.  -On Zetia 10 mg daily.  7. Type 2 DM- A1c 8.8%     Plan  -Pt w/ongoing c/o persistent non productive cough, mild SOB at rest and JOHNSON. She remains on supplemental O2 w/stable O2 sats. She examines volume overloaded. Suspect the underlying etiology for her acute HF exacerbation is likely stemming from ineffective diuretic dosing. She has been compliant w/a low salt diet and her diuretic therapy PTA.  -DC IV lasix, start IV Bumex 1mg BID and assess her response.  -Request pulmonary consultation for further assistance w/management of recurrent right effusion despite ongoing diuretic attempts and prior/recent thoracentesis procedures.     -Continue Cardizem CD  120 mg daily, flecainide 150 mg twice daily, and metoprolol succinate 75 mg BID  -Continue Zetia 10 mg daily.   -Continue Eliquis 5 mg BID  -No indication for telemetry.       HPI: Farnaz Martin 79 y.o. year old female with a medical hx of HFpEF, recurrent right sided pleural effusions, paroxysmal atrial fibrillation/flutter and PAT, SSS, PPM in situ, HTN, HLD, and DM II. Non smoker, denies excessive alcohol or recreational drug use.   Follows with Dr. Marisa muñoz/GUY.   Recent hospitalization at [facility] 7/24 -7/29 after presenting with weakness in the context of poor oral intake/dehydration and presumed overdiuresis resulting in KIMBERLEY in which diuretics were intially witheld and she received IVF's for hydration. She had notable improvement in her symptomatology and renal function. She was restarted on torsemide 10 mg daily on DC (had previously been on furosemide 40 mg daily PTA). DC weight was 158 lbs.   She now presents to the [facility] campus w/progressive JOHNSON, orthopnea, cough, and reported hypoxia. CXR in the ED demonstrated mild pulmonary edema, stable R>L pleural effusions. She was initiated on a course of IV diuresis by the primary team. Cardiology has now been consulted for further treatment recs/management,         Family History:   Family History   Problem Relation Age of Onset    Heart disease Mother     Diabetes Mother     Heart disease Father     Coronary artery disease Father     Stroke Father         cerebrovascular accident    Heart attack Father         myocardial infarction    Sudden death Father         scd    Other Family         Back disorder    Coronary artery disease Family     Neuropathy Family     Osteoporosis Family     No Known Problems Daughter     No Known Problems Maternal Grandmother     No Known Problems Maternal Grandfather     No Known Problems Paternal Grandmother     No Known Problems Paternal Grandfather     Cancer Maternal Uncle     Breast cancer Maternal Aunt 65    No Known Problems  Son     No Known Problems Maternal Aunt     No Known Problems Maternal Aunt     No Known Problems Maternal Aunt     No Known Problems Paternal Aunt     No Known Problems Paternal Aunt     Anuerysm Neg Hx     Clotting disorder Neg Hx     Arrhythmia Neg Hx     Heart failure Neg Hx      Historical Information   Past Medical History:   Diagnosis Date    Actinic keratosis     last assessed - 06Jun2014    Arthritis     Atrial fibrillation with rapid ventricular response (HCC)     last assessed - 26Apr2016    Atrial fibrillation with rapid ventricular response (HCC) 04/19/2016    Basal cell carcinoma     Benign colon polyp     last assessed - 27Apr2015    Bronchiectasis without complication (HCC)     CHF (congestive heart failure) (HCC) 06/2022    Chronic diastolic CHF (congestive heart failure) (HCC)     Disease of thyroid gland     Effusion of knee joint right     Resolved - 19Apr2016    Esophageal reflux     Fibromyalgia     last assessed - [id number]    Fibromyalgia, primary     GERD (gastroesophageal reflux disease)     Hyperlipidemia     Hypertension     Hyponatremia     Malignant neoplasm of thyroid gland (HCC)     Meningioma (HCC)     MGUS (monoclonal gammopathy of unknown significance)     Palpitations     last assessed - 30Apr2013    Peroneal tendonitis, right     last assessed - 02Oct2013    Right lumbar radiculopathy 03/17/2016    Thyroid cancer (HCC)     Thyroid nodule     Trochanteric bursitis of left hip 03/09/2018     Past Surgical History:   Procedure Laterality Date    CARDIAC ELECTROPHYSIOLOGY STUDY AND ABLATION  08/2022    CATARACT EXTRACTION Bilateral     COLONOSCOPY  03/2018    COLONOSCOPY W/ POLYPECTOMY  2021    repeat in 5 years    EYE SURGERY      IR THORACENTESIS  7/27/2024    OOPHORECTOMY      MA NEUROPLASTY &/TRANSPOS MEDIAN NRV CARPAL TUNNE Right 11/14/2019    Procedure: RELEASE CARPAL TUNNEL;  Surgeon: Onesimo Tate MD;  Location: [facility];  Service: Orthopedics    MA TOTAL THYROID  LOBECTOMY UNI W/WO ISTHMUSECTOMY Left 12/16/2020    Procedure: Left THYROID LOBECTOMY;  Surgeon: Jhony Bhatt MD;  Location: [facility];  Service: ENT    RECTAL POLYPECTOMY      REPLACEMENT TOTAL KNEE Left     last assessed - [id number]    TONSILLECTOMY      TOTAL ABDOMINAL HYSTERECTOMY      TUBAL LIGATION      US GUIDED THYROID BIOPSY  10/14/2020     Social History   Social History     Substance and Sexual Activity   Alcohol Use Not Currently     Social History     Substance and Sexual Activity   Drug Use Never     Social History     Tobacco Use   Smoking Status Never   Smokeless Tobacco Never     Family History:   Family History   Problem Relation Age of Onset    Heart disease Mother     Diabetes Mother     Heart disease Father     Coronary artery disease Father     Stroke Father         cerebrovascular accident    Heart attack Father         myocardial infarction    Sudden death Father         scd    Other Family         Back disorder    Coronary artery disease Family     Neuropathy Family     Osteoporosis Family     No Known Problems Daughter     No Known Problems Maternal Grandmother     No Known Problems Maternal Grandfather     No Known Problems Paternal Grandmother     No Known Problems Paternal Grandfather     Cancer Maternal Uncle     Breast cancer Maternal Aunt 65    No Known Problems Son     No Known Problems Maternal Aunt     No Known Problems Maternal Aunt     No Known Problems Maternal Aunt     No Known Problems Paternal Aunt     No Known Problems Paternal Aunt     Anuerysm Neg Hx     Clotting disorder Neg Hx     Arrhythmia Neg Hx     Heart failure Neg Hx      Review of Systems:  Review of Systems   Constitutional:  Negative for chills, fatigue and fever.   Respiratory:  Positive for cough and shortness of breath.    Cardiovascular:  Negative for chest pain, palpitations and leg swelling.   Gastrointestinal:  Negative for abdominal pain.   Neurological:  Negative for dizziness, light-headedness and  headaches.   All other systems reviewed and are negative.    Scheduled Meds:  Current Facility-Administered Medications   Medication Dose Route Frequency Provider Last Rate    albuterol  2 puff Inhalation Q6H PRN Steve Tripathi MD      apixaban  5 mg Oral BID Steve Tripathi MD      ascorbic acid  500 mg Oral Daily Steve Tripathi MD      benzonatate  100 mg Oral TID PRN Steve Tripathi MD      Cholecalciferol  1,000 Units Oral Daily Steve Tripathi MD      dextromethorphan-guaiFENesin  10 mL Oral Q4H PRN Odin Castro MD      diltiazem  120 mg Oral Daily Steve Tripathi MD      ezetimibe  10 mg Oral HS Steve Tripathi MD      famotidine  20 mg Oral BID Steve Tripathi MD      flecainide  150 mg Oral BID Steve Tripathi MD      furosemide  40 mg Intravenous BID (diuretic) Steve Triapthi MD      gabapentin  300 mg Oral HS Steve Tripathi MD      levalbuterol  1.25 mg Nebulization TID Fauzia Pennington MD      loratadine  10 mg Oral Daily Steve Tripathi MD      metoprolol succinate  75 mg Oral Q12H Steve Tripathi MD      rOPINIRole  2 mg Oral HS Steve Tripathi MD      zolpidem  10 mg Oral HS Steve Tripathi MD       Continuous Infusions:   PRN Meds:.  albuterol    benzonatate    dextromethorphan-guaiFENesin  all current active meds have been reviewed and current meds:   Current Facility-Administered Medications   Medication Dose Route Frequency    albuterol (PROVENTIL HFA,VENTOLIN HFA) inhaler 2 puff  2 puff Inhalation Q6H PRN    apixaban (ELIQUIS) tablet 5 mg  5 mg Oral BID    ascorbic acid (VITAMIN C) tablet 500 mg  500 mg Oral Daily    benzonatate (TESSALON PERLES) capsule 100 mg  100 mg Oral TID PRN    bumetanide (BUMEX) injection 2 mg  2 mg Intravenous BID    Cholecalciferol (VITAMIN D3) tablet 1,000 Units  1,000 Units Oral Daily    dextromethorphan-guaiFENesin (ROBITUSSIN DM) oral syrup 10 mL  10 mL Oral Q4H PRN    diltiazem (CARDIZEM CD) 24 hr capsule 120 mg  120 mg Oral Daily    ezetimibe (ZETIA) tablet 10 mg  10 mg  Oral HS    famotidine (PEPCID) tablet 20 mg  20 mg Oral BID    flecainide (TAMBOCOR) tablet 150 mg  150 mg Oral BID    gabapentin (NEURONTIN) capsule 300 mg  300 mg Oral HS    levalbuterol (XOPENEX) inhalation solution 1.25 mg  1.25 mg Nebulization TID    loratadine (CLARITIN) tablet 10 mg  10 mg Oral Daily    metoprolol succinate (TOPROL-XL) 24 hr tablet 75 mg  75 mg Oral Q12H    rOPINIRole (REQUIP) tablet 2 mg  2 mg Oral HS    zolpidem (AMBIEN) tablet 10 mg  10 mg Oral HS       Allergies   Allergen Reactions    Sotalol Other (See Comments)     Prolonged QTc leading to torsades de pointes     Penicillins Other (See Comments)     As a child calcium deposit in the arm     Ace Inhibitors GI Intolerance     Did feel well on it    Omeprazole Abdominal Pain, Rash and Vomiting     stomach pain, vomiting, rash       Objective   Vitals: Blood pressure 111/76, pulse (!) 120, temperature 97.8 °F (36.6 °C), temperature source Oral, resp. rate 17, weight 73.3 kg (161 lb 9.6 oz), last menstrual period 02/01/1990, SpO2 (!) 88%, not currently breastfeeding., Body mass index is 29.56 kg/m².,   Orthostatic Blood Pressures      Flowsheet Row Most Recent Value   Blood Pressure 111/76 filed at 08/07/2024 0751   Patient Position - Orthostatic VS Sitting filed at 08/07/2024 0751              Intake/Output Summary (Last 24 hours) at 8/7/2024 1028  Last data filed at 8/7/2024 0930  Gross per 24 hour   Intake 465 ml   Output 600 ml   Net -135 ml       Invasive Devices       Peripheral Intravenous Line  Duration             Peripheral IV 08/06/24 Left Antecubital <1 day                    Physical Exam:  Physical Exam  Vitals and nursing note reviewed.   Constitutional:       General: She is not in acute distress.     Appearance: She is not diaphoretic.   HENT:      Head: Normocephalic and atraumatic.   Eyes:      General: No scleral icterus.  Cardiovascular:      Rate and Rhythm: Normal rate and regular rhythm.      Pulses: Normal pulses.       Heart sounds: Normal heart sounds.   Pulmonary:      Effort: Pulmonary effort is normal.      Breath sounds: Rales present. No wheezing.   Abdominal:      Palpations: Abdomen is soft.      Tenderness: There is no abdominal tenderness.   Musculoskeletal:      Right lower leg: Edema present.      Left lower leg: Edema present.   Skin:     General: Skin is warm and dry.      Capillary Refill: Capillary refill takes less than 2 seconds.   Neurological:      General: No focal deficit present.      Mental Status: She is alert and oriented to person, place, and time.   Psychiatric:         Mood and Affect: Mood normal.         Lab Results:   Recent Results (from the past 24 hour(s))   CBC and differential    Collection Time: 08/06/24  2:05 PM   Result Value Ref Range    WBC 5.89 4.31 - 10.16 Thousand/uL    RBC 3.98 3.81 - 5.12 Million/uL    Hemoglobin 11.5 11.5 - 15.4 g/dL    Hematocrit 36.3 34.8 - 46.1 %    MCV 91 82 - 98 fL    MCH 28.9 26.8 - 34.3 pg    MCHC 31.7 31.4 - 37.4 g/dL    RDW 14.6 11.6 - 15.1 %    MPV 10.2 8.9 - 12.7 fL    Platelets 267 149 - 390 Thousands/uL    nRBC 0 /100 WBCs    Segmented % 67 43 - 75 %    Immature Grans % 0 0 - 2 %    Lymphocytes % 19 14 - 44 %    Monocytes % 10 4 - 12 %    Eosinophils Relative 3 0 - 6 %    Basophils Relative 1 0 - 1 %    Absolute Neutrophils 3.90 1.85 - 7.62 Thousands/µL    Absolute Immature Grans 0.02 0.00 - 0.20 Thousand/uL    Absolute Lymphocytes 1.13 0.60 - 4.47 Thousands/µL    Absolute Monocytes 0.61 0.17 - 1.22 Thousand/µL    Eosinophils Absolute 0.18 0.00 - 0.61 Thousand/µL    Basophils Absolute 0.05 0.00 - 0.10 Thousands/µL   Comprehensive metabolic panel    Collection Time: 08/06/24  2:05 PM   Result Value Ref Range    Sodium 135 135 - 147 mmol/L    Potassium 4.3 3.5 - 5.3 mmol/L    Chloride 98 96 - 108 mmol/L    CO2 29 21 - 32 mmol/L    ANION GAP 8 4 - 13 mmol/L    BUN 31 (H) 5 - 25 mg/dL    Creatinine 1.38 (H) 0.60 - 1.30 mg/dL    Glucose 138 65 - 140  "mg/dL    Calcium 9.0 8.4 - 10.2 mg/dL    AST 36 13 - 39 U/L    ALT 39 7 - 52 U/L    Alkaline Phosphatase 70 34 - 104 U/L    Total Protein 6.7 6.4 - 8.4 g/dL    Albumin 3.7 3.5 - 5.0 g/dL    Total Bilirubin 0.87 0.20 - 1.00 mg/dL    eGFR 36 ml/min/1.73sq m   HS Troponin 0hr (reflex protocol)    Collection Time: 08/06/24  2:05 PM   Result Value Ref Range    hs TnI 0hr 10 \"Refer to ACS Flowchart\"- see link ng/L   B-Type Natriuretic Peptide(BNP)    Collection Time: 08/06/24  2:05 PM   Result Value Ref Range    BNP 1,091 (H) 0 - 100 pg/mL   COVID/FLU/RSV    Collection Time: 08/06/24  2:05 PM    Specimen: Nose; Nares   Result Value Ref Range    SARS-CoV-2 Negative Negative    INFLUENZA A PCR Negative Negative    INFLUENZA B PCR Negative Negative    RSV PCR Negative Negative   ECG 12 lead    Collection Time: 08/06/24  3:24 PM   Result Value Ref Range    Ventricular Rate 62 BPM    Atrial Rate 62 BPM    AK Interval 202 ms    QRSD Interval 202 ms    QT Interval 558 ms    QTC Interval 566 ms    P Axis  degrees    QRS Axis 253 degrees    T Wave [place] 95 degrees   HS Troponin I 2hr    Collection Time: 08/06/24  3:55 PM   Result Value Ref Range    hs TnI 2hr 11 \"Refer to ACS Flowchart\"- see link ng/L    Delta 2hr hsTnI 1 <20 ng/L   Basic metabolic panel    Collection Time: 08/07/24  5:28 AM   Result Value Ref Range    Sodium 136 135 - 147 mmol/L    Potassium 3.5 3.5 - 5.3 mmol/L    Chloride 98 96 - 108 mmol/L    CO2 30 21 - 32 mmol/L    ANION GAP 8 4 - 13 mmol/L    BUN 31 (H) 5 - 25 mg/dL    Creatinine 1.32 (H) 0.60 - 1.30 mg/dL    Glucose 137 65 - 140 mg/dL    Calcium 8.8 8.4 - 10.2 mg/dL    eGFR 38 ml/min/1.73sq m   CBC and differential    Collection Time: 08/07/24  5:28 AM   Result Value Ref Range    WBC 5.70 4.31 - 10.16 Thousand/uL    RBC 3.66 (L) 3.81 - 5.12 Million/uL    Hemoglobin 10.7 (L) 11.5 - 15.4 g/dL    Hematocrit 33.3 (L) 34.8 - 46.1 %    MCV 91 82 - 98 fL    MCH 29.2 26.8 - 34.3 pg    MCHC 32.1 31.4 - 37.4 g/dL    " RDW 14.5 11.6 - 15.1 %    MPV 10.1 8.9 - 12.7 fL    Platelets 238 149 - 390 Thousands/uL    nRBC 0 /100 WBCs    Segmented % 56 43 - 75 %    Immature Grans % 0 0 - 2 %    Lymphocytes % 26 14 - 44 %    Monocytes % 13 (H) 4 - 12 %    Eosinophils Relative 4 0 - 6 %    Basophils Relative 1 0 - 1 %    Absolute Neutrophils 3.16 1.85 - 7.62 Thousands/µL    Absolute Immature Grans 0.02 0.00 - 0.20 Thousand/uL    Absolute Lymphocytes 1.50 0.60 - 4.47 Thousands/µL    Absolute Monocytes 0.72 0.17 - 1.22 Thousand/µL    Eosinophils Absolute 0.25 0.00 - 0.61 Thousand/µL    Basophils Absolute 0.05 0.00 - 0.10 Thousands/µL       * Please Note: Fluency DirectDictation voice to text software may have been used in the creation of this document. **

## 2024-08-07 NOTE — PROGRESS NOTES
[facility]  Progress Note  Name: Farnaz Martin I  MRN: [id number]  Unit/Bed#: S -01 I Date of Admission: 8/6/2024   Date of Service: 8/7/2024 I Hospital Day: 1    Assessment & Plan   * Acute on chronic heart failure (HCC)  Assessment & Plan    Lab Results   Component Value Date    LVEF 50 07/14/2024    BNP 1,091 (H) 08/06/2024    BNP 1,428 (H) 07/24/2024       Presents with increased shortness of breath, dyspnea on exertion, lower extremity edema, and cough with productive sputum  Patient is currently volume overloaded.  3 to 4-day history of progressively worsening dyspnea, dry cough, worsening lower extremity edema.  She required increased oxygen when EMS arrived. difficulty talking normal sentences secondary to shortness of breath.  She is now unable to lie flat.  She was discharged on recent admission with 10 mg torsemide daily  Signs and symptoms indicative of acute CHF exacerbation secondary to under diuresis.  Patient is on 2 L of oxygen, presents with increased work of breathing, dry cough, + hepatojular reflux, appears volume overloaded.    Acute and chronic respiratory failure with hypoxia 2/2 CHF exacerbation a/e/b hypoxia, increased oxygen demands requiring CXR, Nebs, IV Lasix and 4L NC oxygen.       Findings: ED provider: She is at baseline on 2 L O2 supplemental O2 and, because of hypoxia to the 80s, was bumped up to 4.  On arrival 97% on 4L (baseline chronic 2L NC)    Plan:  GDMT:  Diuretic: iv lasix 40mg BID, B-Blocker: Toprol  Cardiac diet, sodium restriction  CMP, magnesium tomorrow a.m; Goal Mg > 2 and K > 4; Replete prn  Daily standing weights, Measure I/O   Consult cardiology   Echo 50% LVEF     Wt Readings from Last 3 Encounters:   08/07/24 73.3 kg (161 lb 9.6 oz)   07/29/24 72 kg (158 lb 12.8 oz)   07/28/24 71.9 kg (158 lb 8.2 oz)               Elevated serum creatinine  Assessment & Plan  Recent Labs     08/06/24  1405   CREATININE 1.38*   EGFR 36      Estimated Creatinine Clearance: 30.5 mL/min (A) (by C-G formula based on SCr of 1.38 mg/dL (H)).    POA 1.38; (baseline 1.1-1.28)  Likely secondary to fluid overload due to being under diuresed at home with 10 mg torsemide daily.    Plan:  IV Lasix 40 mg twice daily  Monitor BMP, IV fluids held  Avoid hypoperfusion of the kidneys, minimize nephrotoxins    Cough  Assessment & Plan  Pt endorsing orthopnea, SOB  Productive in nature without production of sputum  Uses Spiriva inhaler and Robitussin at home for sx's  R/p chest x-ray shows increased congestion, persistent pleural effusion  Cough component likely CHF exacerbation    Plan:  Atrovent neb four times daily  Pulm consulted, appreciate reccs    Paroxysmal A-fib (HCC)  Assessment & Plan  Afib-  S/p ablation in 03/2021.   EKG- AV dual-paced rhythm, Vent. rate has decreased by 14 BPM since prior EKG  Low voltage QRS    Plan:  Eliquis 5 mg twice daily  Continue flecainide 150 mg twice daily and metoprolol succinate 75 mg twice daily  Continue diltiazem 120 mg daily             VTE Pharmacologic Prophylaxis: VTE Score: 5 High Risk (Score >/= 5) - Pharmacological DVT Prophylaxis Ordered: apixaban (Eliquis). Sequential Compression Devices Ordered.    Mobility:   JH-HLM Achieved: 7: Walk 25 feet or more  JH-HLM Goal achieved. Continue to encourage appropriate mobility.    Patient Centered Rounds: I performed bedside rounds with nursing staff today.   Discussions with Specialists or Other Care Team Provider: Cardiology     Education and Discussions with Family / Patient: Updated  (daughter) via phone.    Total Time Spent on Date of Encounter in care of patient: 60 mins. This time was spent on one or more of the following: performing physical exam; counseling and coordination of care; obtaining or reviewing history; documenting in the medical record; reviewing/ordering tests, medications or procedures; communicating with other healthcare professionals  and discussing with patient's family/caregivers.    Current Length of Stay: 1 day(s)  Current Patient Status: Inpatient   Certification Statement: The patient will continue to require additional inpatient hospital stay due to Acute on chronic congestive heart failure  Discharge Plan: Anticipate discharge in 24-48 hrs to rehab facility.    Code Status: Level 3 - DNAR and DNI    Subjective:   Evaluated pt at bedside this morning.  She was resting comfortably on room air eating breakfast.  Pt notes improvements in her work of breathing, however she still endorses having dry cough that has not been improving.  Discussed plan of care for today and goals of care going forward.  All questions and concerns were answered at bedside.     Objective:     Vitals:   Temp (24hrs), Av °F (36.7 °C), Min:97.8 °F (36.6 °C), Max:98.4 °F (36.9 °C)    Temp:  [97.8 °F (36.6 °C)-98.4 °F (36.9 °C)] 97.8 °F (36.6 °C)  HR:  [] 120  Resp:  [16-20] 17  BP: (111-138)/(54-76) 111/76  SpO2:  [87 %-99 %] 88 %  Body mass index is 29.56 kg/m².     Input and Output Summary (last 24 hours):     Intake/Output Summary (Last 24 hours) at 2024 1122  Last data filed at 2024 0930  Gross per 24 hour   Intake 465 ml   Output 600 ml   Net -135 ml       Physical Exam:   Physical Exam  Vitals and nursing note reviewed.   Constitutional:       General: She is not in acute distress.     Appearance: She is well-developed.   HENT:      Head: Normocephalic and atraumatic.   Eyes:      Conjunctiva/sclera: Conjunctivae normal.   Cardiovascular:      Rate and Rhythm: Normal rate and regular rhythm.      Pulses: Normal pulses.      Heart sounds: Normal heart sounds. No murmur heard.     Comments: Increased mucus membranes  +HJ reflex  Pulmonary:      Effort: Pulmonary effort is normal. No respiratory distress.      Breath sounds: Normal breath sounds.   Abdominal:      Palpations: Abdomen is soft.      Tenderness: There is no abdominal tenderness.    Musculoskeletal:         General: No swelling.      Cervical back: Neck supple.      Right lower leg: Edema present.      Left lower leg: Edema present.   Skin:     General: Skin is warm and dry.      Capillary Refill: Capillary refill takes less than 2 seconds.   Neurological:      Mental Status: She is alert.   Psychiatric:         Mood and Affect: Mood normal.          Additional Data:     Labs:  Results from last 7 days   Lab Units 08/07/24  0528   WBC Thousand/uL 5.70   HEMOGLOBIN g/dL 10.7*   HEMATOCRIT % 33.3*   PLATELETS Thousands/uL 238   SEGS PCT % 56   LYMPHO PCT % 26   MONO PCT % 13*   EOS PCT % 4     Results from last 7 days   Lab Units 08/07/24  0528 08/06/24  1405   SODIUM mmol/L 136 135   POTASSIUM mmol/L 3.5 4.3   CHLORIDE mmol/L 98 98   CO2 mmol/L 30 29   BUN mg/dL 31* 31*   CREATININE mg/dL 1.32* 1.38*   ANION GAP mmol/L 8 8   CALCIUM mg/dL 8.8 9.0   ALBUMIN g/dL  --  3.7   TOTAL BILIRUBIN mg/dL  --  0.87   ALK PHOS U/L  --  70   ALT U/L  --  39   AST U/L  --  36   GLUCOSE RANDOM mg/dL 137 138                       Lines/Drains:  Invasive Devices       Peripheral Intravenous Line  Duration             Peripheral IV 08/06/24 Left Antecubital <1 day                          Imaging: Reviewed radiology reports from this admission including: chest xray    Recent Cultures (last 7 days):         Last 24 Hours Medication List:   Current Facility-Administered Medications   Medication Dose Route Frequency Provider Last Rate    albuterol  2 puff Inhalation Q6H PRN Steve Tripathi MD      apixaban  5 mg Oral BID Steve Tripathi MD      ascorbic acid  500 mg Oral Daily Steve Tripathi MD      benzonatate  100 mg Oral TID PRN Steve Tripathi MD      bumetanide  2 mg Intravenous BID JENNY Hou      Cholecalciferol  1,000 Units Oral Daily Steve Tripathi MD      dextromethorphan-guaiFENesin  10 mL Oral Q4H PRN Odin Castro MD      diltiazem  120 mg Oral Daily Steve Tripathi MD      ezetimibe  10 mg  Oral HS Steve Tripathi MD      famotidine  20 mg Oral BID Steve Tripathi MD      flecainide  150 mg Oral BID Steve Tripathi MD      gabapentin  300 mg Oral HS Steve Tripathi MD      levalbuterol  1.25 mg Nebulization TID Fauzia Pennington MD      loratadine  10 mg Oral Daily Steve Tripathi MD      metoprolol succinate  75 mg Oral Q12H Steve Tripathi MD      rOPINIRole  2 mg Oral HS Steve Tripathi MD      zolpidem  10 mg Oral HS Steve Tripathi MD          Today, Patient Was Seen By: Steve Tripathi MD    **Please Note: This note may have been constructed using a voice recognition system.**

## 2024-08-07 NOTE — ASSESSMENT & PLAN NOTE
Wt Readings from Last 3 Encounters:   08/07/24 73.3 kg (161 lb 9.6 oz)   07/29/24 72 kg (158 lb 12.8 oz)   07/28/24 71.9 kg (158 lb 8.2 oz)

## 2024-08-07 NOTE — CONSULTS
Consult Note - Pulmonary   Farnaz Martin 79 y.o. female MRN: [id number]  Unit/Bed#: S -01 Encounter: [id number]      Reason for consultation: Shortness of breath and respiratory distress    Requesting provider:  Dr. Deuce M.D.     Assessment & Recommendations:     Acute on Chronic/ Refractory Congestive Heart Failure (preserved EF)         -Patient was discharged and compliant with 10 mg torsemide. Patient required 20 mg torsemide at her rehab facility.          - Patient received lasix 40 mg IV in the         -Inpatient medication: BUMEX 1 mg injection         - On physical exam not volume overloaded          Plan   -Continue cardiology recommendations BUMEX 1 mg injection, Strict I's and O's and daily weights.   -Risks vs benefits were discussed with patient in terms of considering repeat thoracentesis or placing a pleural catheter.  Patient reported that since she feels better since she is risk of infection catheter she declined at this time.  Patient is agreeable to considering thoracentesis if needed in the future.  -Possible home oxygen evaluation     2.  Chronic Productive Cough secondary to Bronchiectasis     Plan   -Continue XOPENEX inhalation solution 1.25 mg   -Continue home inhalers and nebulizer treatment on discharge.   -Possible home oxygen evaluation     3. Goals of Care     Plan   -On previous admission goals of care discussion were started for considering hospice   -As per primary team.     History of Present Illness   HPI:  Farnaz Martin is a 79 y.o. female with a PMH of diastolic heart failure type II, paroxysmal A-fib (AV dual pacemaker), and bronchiectasis who presented to [facility] with shortness of breath and hypoxia.  Neurology was consulted for worsening shortness of breath and increased work of breathing.      Of note the patient was recently hospitalized and discharged from [facility] on 7/29/2020 for for treatment of KIMBERLEY and congestive heart failure.   During that hospitalization chest x-ray was positive for bilateral pleural effusions.  Patient she had a thoracentesis with 1 L removed, transudate fluid.  Patient was discharged with a prescription of 10 mg torsemide.Discussion with hospice service was ongoing during end of discharge.     Patient was had rehab postdischarge.  She began experience increased shortness of breath.  As per daughter the patient's torsemide was increased from 10 mg to 20 mg to help alleviate shortness of breath.  Patient has a history of bronchiectasis and her home mediations consist of  Spiriva inhaler, albuterol inhaler twice a day, and albuterol nebulizer twice a day. These past few days patient has been using her inhalers more than twice a day and she has been compliant with nebulizer treatment.  She has been using more pillows to relieve SOB at night. Patient reported improvement in symptoms after each nebulizer treatment and whenever she got oxygen at the rehab facility. Per daughter the patient has gained 12 pounds since prior discharge.  Patient denies sick contact, fever, chills, or diarrhea post discharge from [facility]. Of note,  patient reports a chronic productive cough yellow-white consistent with patient's history of bronchiectasis. Patient denies sick contact, fever, or chills on admission.     Per EMS patient was hypoxic and SOB.  In the ED, vitals were afebrile and SpO2 was in the 80s. Patient's saturation improved once placed on 4 liters. Physical exam was positive for bilateral lower extremity edema,  bibasilar rales, no wheezing or rhonchi.     Patient was admitted to [facility]. Pulmonology was consulted for persistent SOB and respiratory distress.     Subjective     Patient was examined on bedside on 8/7/2024. She was sitting up on her chair sating 90-92 on room air. Daughter was at bedside. Patient reports that she feels much better than when she was admitted last night. She no longer reports SOB. Denies chest  tightness. She has been able to walk to the restroom without SOB. Continues to have yellow-white productive cough. She denies fever or chills.     Patient was again seen in the evening with the pulmonology team. She continues to report no SOB and is in no respiratory distress. Risks vs benefits were discussed with patient in terms of considering repeat thoracentesis or placing a pleural catheter.  Patient reported that since she feels better since she is risk of infection catheter she declined at this time.  Patient is agreeable to considering thoracentesis if needed in the future. Patient notices that her SOB is relieved with oxygen therapy. She is willing to start at home oxygen therapy. Patient requested that we update her daughter with our plan.  Attempted to call patient's daughter in the evening left a voicemail.     Review of systems:  12 point review of systems was completed and was otherwise negative except as listed in HPI.      Historical Information   Past Medical History:   Diagnosis Date    Actinic keratosis     last assessed - 06Jun2014    Arthritis     Atrial fibrillation with rapid ventricular response (HCC)     last assessed - 26Apr2016    Atrial fibrillation with rapid ventricular response (HCC) 04/19/2016    Basal cell carcinoma     Benign colon polyp     last assessed - 27Apr2015    Bronchiectasis without complication (HCC)     CHF (congestive heart failure) (HCC) 06/2022    Chronic diastolic CHF (congestive heart failure) (HCC)     Disease of thyroid gland     Effusion of knee joint right     Resolved - 19Apr2016    Esophageal reflux     Fibromyalgia     last assessed - 27Dec2017    Fibromyalgia, primary     GERD (gastroesophageal reflux disease)     Hyperlipidemia     Hypertension     Hyponatremia     Malignant neoplasm of thyroid gland (HCC)     Meningioma (HCC)     MGUS (monoclonal gammopathy of unknown significance)     Palpitations     last assessed - 30Apr2013    Peroneal tendonitis,  right     last assessed - 02Oct2013    Right lumbar radiculopathy 03/17/2016    Thyroid cancer (HCC)     Thyroid nodule     Trochanteric bursitis of left hip 03/09/2018     Past Surgical History:   Procedure Laterality Date    CARDIAC ELECTROPHYSIOLOGY STUDY AND ABLATION  08/2022    CATARACT EXTRACTION Bilateral     COLONOSCOPY  03/2018    COLONOSCOPY W/ POLYPECTOMY  2021    repeat in 5 years    EYE SURGERY      IR THORACENTESIS  7/27/2024    OOPHORECTOMY      PA NEUROPLASTY &/TRANSPOS MEDIAN NRV CARPAL TUNNE Right 11/14/2019    Procedure: RELEASE CARPAL TUNNEL;  Surgeon: Onesimo Tate MD;  Location: [facility];  Service: Orthopedics    PA TOTAL THYROID LOBECTOMY UNI W/WO ISTHMUSECTOMY Left 12/16/2020    Procedure: Left THYROID LOBECTOMY;  Surgeon: Jhony Bhatt MD;  Location: [facility];  Service: ENT    RECTAL POLYPECTOMY      REPLACEMENT TOTAL KNEE Left     last assessed - 27Apr2015    TONSILLECTOMY      TOTAL ABDOMINAL HYSTERECTOMY      TUBAL LIGATION      US GUIDED THYROID BIOPSY  10/14/2020     Family History   Problem Relation Age of Onset    Heart disease Mother     Diabetes Mother     Heart disease Father     Coronary artery disease Father     Stroke Father         cerebrovascular accident    Heart attack Father         myocardial infarction    Sudden death Father         scd    Other Family         Back disorder    Coronary artery disease Family     Neuropathy Family     Osteoporosis Family     No Known Problems Daughter     No Known Problems Maternal Grandmother     No Known Problems Maternal Grandfather     No Known Problems Paternal Grandmother     No Known Problems Paternal Grandfather     Cancer Maternal Uncle     Breast cancer Maternal Aunt 65    No Known Problems Son     No Known Problems Maternal Aunt     No Known Problems Maternal Aunt     No Known Problems Maternal Aunt     No Known Problems Paternal Aunt     No Known Problems Paternal Aunt     Anuerysm Neg Hx     Clotting disorder Neg Hx      Arrhythmia Neg Hx     Heart failure Neg Hx        Occupational History:     Social History: non-smoker     Meds/Allergies   Current Facility-Administered Medications   Medication Dose Route Frequency    acetaminophen (TYLENOL) tablet 650 mg  650 mg Oral Q8H PRN    albuterol (PROVENTIL HFA,VENTOLIN HFA) inhaler 2 puff  2 puff Inhalation Q6H PRN    apixaban (ELIQUIS) tablet 5 mg  5 mg Oral BID    ascorbic acid (VITAMIN C) tablet 500 mg  500 mg Oral Daily    benzonatate (TESSALON PERLES) capsule 100 mg  100 mg Oral TID PRN    bumetanide (BUMEX) injection 1 mg  1 mg Intravenous BID    Cholecalciferol (VITAMIN D3) tablet 1,000 Units  1,000 Units Oral Daily    dextromethorphan-guaiFENesin (ROBITUSSIN DM) oral syrup 10 mL  10 mL Oral Q4H PRN    diltiazem (CARDIZEM CD) 24 hr capsule 120 mg  120 mg Oral Daily    ezetimibe (ZETIA) tablet 10 mg  10 mg Oral HS    famotidine (PEPCID) tablet 20 mg  20 mg Oral BID    flecainide (TAMBOCOR) tablet 150 mg  150 mg Oral BID    gabapentin (NEURONTIN) capsule 300 mg  300 mg Oral HS    levalbuterol (XOPENEX) inhalation solution 1.25 mg  1.25 mg Nebulization TID    loratadine (CLARITIN) tablet 10 mg  10 mg Oral Daily    metoprolol succinate (TOPROL-XL) 24 hr tablet 75 mg  75 mg Oral Q12H    rOPINIRole (REQUIP) tablet 2 mg  2 mg Oral HS    zolpidem (AMBIEN) tablet 10 mg  10 mg Oral HS       Medications Prior to Admission:     albuterol (2.5 mg/3 mL) 0.083 % nebulizer solution    albuterol (ProAir HFA) 90 mcg/act inhaler    apixaban (ELIQUIS) 5 mg    ascorbic Acid (VITAMIN C) 500 MG CPCR    benzonatate (TESSALON PERLES) 100 mg capsule    Cetirizine HCl (ZyrTEC Allergy) 10 MG CAPS    Cholecalciferol 50 MCG (2000 UT) CAPS    dapagliflozin 5 MG TABS    diltiazem (CARDIZEM CD) 120 mg 24 hr capsule    ezetimibe (ZETIA) 10 mg tablet    famotidine (PEPCID) 20 mg tablet    flecainide (TAMBOCOR) 150 MG tablet    gabapentin (NEURONTIN) 300 mg capsule    glucose blood (OneTouch Verio) test strip     Insulin Glargine Solostar (Lantus SoloStar) 100 UNIT/ML SOPN    Insulin Pen Needle 31G X 4 MM MISC    ipratropium (ATROVENT) 0.03 % nasal spray    metoprolol succinate (TOPROL-XL) 25 mg 24 hr tablet    OneTouch Delica Lancets 33G MISC    rOPINIRole (REQUIP) 1 mg tablet    tiotropium (Spiriva Respimat) 2.5 MCG/ACT AERS inhaler    torsemide (DEMADEX) 10 mg tablet    zolpidem (AMBIEN) 10 mg tablet    TURMERIC PO  Allergies   Allergen Reactions    Sotalol Other (See Comments)     Prolonged QTc leading to torsades de pointes     Penicillins Other (See Comments)     As a child calcium deposit in the arm     Ace Inhibitors GI Intolerance     Did feel well on it    Omeprazole Abdominal Pain, Rash and Vomiting     stomach pain, vomiting, rash       Vitals: Blood pressure 103/59, pulse 61, temperature 97.8 °F (36.6 °C), temperature source Oral, resp. rate 17, weight 73.3 kg (161 lb 9.6 oz), last menstrual period 02/01/1990, SpO2 90%, not currently breastfeeding., Body mass index is 29.56 kg/m².      Intake/Output Summary (Last 24 hours) at 8/7/2024 1335  Last data filed at 8/7/2024 1332  Gross per 24 hour   Intake 465 ml   Output 1300 ml   Net -835 ml       Physical Exam  General: Patient was in no respiratory distress she was sitting up on her chair. Awake alert and oriented x 3, conversant without conversational dyspnea, NAD, normal affect  HEENT:  PERRL, Sclera noninjected, nonicteric OU, Nares patent, no nasal flaring, no nasal drainage, Mucous membranes, moist, no oral lesions, normal dentition  NECK: Trachea midline, no accessory muscle use, no stridor, no cervical or supraclavicular adenopathy, JVP not elevated  CARDIAC: Reg, single s1/S2, no m/r/g  PULM:  No visible respiratory distress. Patient was sating 90-92% on room air. Right lower lung field had decreased breath sounds. Crackles appreciated intermittently. No wheezing or rhonchi.   CHEST: No gross deformities, equal chest expansion on inspiration  bilaterally  ABD: Normoactive bowel sounds, soft nontender, nondistended, no rebound, no rigidity, no guarding  : No machado catheter.   EXT: No cyanosis, no clubbing, no edema, normal capillary refill  SKIN:  No lower extremity edema. No rashes, no lesions.   NEURO: no focal neurologic deficits, AAOx3, moving all extremities appropriately    Labs: I have personally reviewed pertinent lab results.  Laboratory and Diagnostics  Results from last 7 days   Lab Units 08/07/24  0528 08/06/24  1405   WBC Thousand/uL 5.70 5.89   HEMOGLOBIN g/dL 10.7* 11.5   HEMATOCRIT % 33.3* 36.3   PLATELETS Thousands/uL 238 267   SEGS PCT % 56 67   MONO PCT % 13* 10   EOS PCT % 4 3     Results from last 7 days   Lab Units 08/07/24  0528 08/06/24  1405 08/01/24  0544   SODIUM mmol/L 136 135 134*   POTASSIUM mmol/L 3.5 4.3 4.4   CHLORIDE mmol/L 98 98 101   CO2 mmol/L 30 29 23   ANION GAP mmol/L 8 8 10   BUN mg/dL 31* 31* 31*   CREATININE mg/dL 1.32* 1.38* 1.25*   CALCIUM mg/dL 8.8 9.0 8.7   GLUCOSE RANDOM mg/dL 137 138 89   ALT U/L  --  39  --    AST U/L  --  36  --    ALK PHOS U/L  --  70  --    ALBUMIN g/dL  --  3.7  --    TOTAL BILIRUBIN mg/dL  --  0.87  --                                              ABG:   Last done on a previous admission. Not on this admission  MICRO: Negative for COVID       Imaging and other studies: I have personally reviewed pertinent reports.    Chest xray: Right sided pleural effusion with costophrenic blunting worse than left.   CT scan: radiology reported left sided mild bronchiectasis. Right sided bronchiectatic findings appreciated        Pulmonary function testing: Last PFTs were done in 5/25/2024: Workup consisted of CT of the chest which showed mild basilar bronchiectasis and pulmonary function testing which was normal with the exception of mild diffusion impairment.     EKG, Pathology, and Other Studies: I have personally reviewed pertinent reports.      EKG done on 8/6/2024: AV dual-paced rhythm  Ventricular rate 62. Compared to EKG done on 27-JUL-2024 03:05,  Vent. rate has decreased BY  14 BPM    Code Status: Level 3 - DNAR and DNI      Leslie Barbosa M.D. PGY1

## 2024-08-07 NOTE — PROGRESS NOTES
Patient:  TANISHA LEVINE    MRN:  [id number]    Aidin Request ID:  [id number]    Level of care reserved:  Skilled Nursing Facility    Partner Reserved:  [facility], [place] [address] [phone]    Clinical needs requested:    Geography searched:  10 miles around [id number]    Start of Service:    Request sent:  12:31pm EDT on 8/7/2024 by Shaniqua Vasquez    Partner reserved:  1:32pm EDT on 8/7/2024 by Shaniqua Vasquez    Choice list shared:  1:32pm EDT on 8/7/2024 by Shaniqua Vasquez

## 2024-08-07 NOTE — CASE MANAGEMENT
Case Management Assessment & Discharge Planning Note    Patient name Farnaz Martin  Location S /S -01 MRN [id number]  : 1945 Date 2024       Current Admission Date: 2024  Current Admission Diagnosis:Acute on chronic heart failure (HCC)   Patient Active Problem List    Diagnosis Date Noted Date Diagnosed    Shortness of breath 2024     Thyroid nodule 2024     Acute kidney injury superimposed on CKD (chronic kidney disease) stage 3, GFR 30-59 ml/min ([facility]) 2024     Fatigue 2024     Recurrent pleural effusion on right 2024     Syncope and collapse 2024     Fall 2024     Type 2 diabetes mellitus with microalbuminuria, without long-term current use of insulin ([facility]) 2024     Paroxysmal A-fib ([facility]) 2024     Ambulatory dysfunction 2024     Hyponatremia 2024     Left renal mass 2024     Cough 2024     Bronchiectasis without complication ([facility]) 2024     Multiple thyroid nodules 2024     Abnormal CT of the chest 2023     S/P revision of total knee, left 2023    Nonrheumatic mitral valve regurgitation 2023     Acute on chronic heart failure ([facility]) 2022     Elevated serum creatinine 2022     Meningioma ([facility]) 2022     Chronic tension-type headache, not intractable 2022     Vasomotor rhinitis 2021     MGUS (monoclonal gammopathy of unknown significance) 2021     Hurthle cell adenoma of thyroid 2021     Tremors of nervous system 2021     Hx of diplopia 2021     S/P placement of cardiac pacemaker 2021     History of sick sinus syndrome s/p PPM 2021     Current use of long term anticoagulation 2021     Malignant neoplasm of thyroid gland ([facility]) 2021     Chronic heart failure with preserved ejection fraction ([facility]) 2021     Goals of care, counseling/discussion 2020     Chronic rhinitis 11/10/2020      Arthritis of both acromioclavicular joints 03/06/2020     Carpal tunnel syndrome on right 11/01/2019     Osteoarthritis of shoulder      TMJ syndrome 10/18/2017     Essential hypertension 04/19/2016     Peripheral neuropathy 03/07/2016     Lumbar spondylosis 06/29/2015     Lumbar stenosis 06/29/2015     Restless legs syndrome 07/09/2014     Allergic rhinitis 04/01/2013     Osteoarthritis of knee 04/01/2013     Insomnia 01/04/2013     Osteopenia 11/26/2012     Fibromyalgia 10/16/2012     Hyperlipidemia 10/16/2012     Osteoarthrosis, hand 10/16/2012     Vitamin D deficiency 10/16/2012       LOS (days): 1  Geometric Mean LOS (GMLOS) (days): 3.9  Days to GMLOS:3     OBJECTIVE:  PATIENT READMITTED TO HOSPITAL  Risk of Unplanned Readmission Score: 40.87         Current admission status: Inpatient       Preferred Pharmacy:   [id number]  [place] [facility] [address] [facility] [address]  [address] [facility] [address] [facility] [address]  Phone: [phone] Fax: [phone]    [facility] [address] [place] [address]  [address]  [address]  [facility] [address]  Phone: [phone] Fax: [phone]    Primary Care Provider: Naveen Monsivais MD    Primary Insurance: MEDICARE  Secondary Insurance: AARP    ASSESSMENT:  Active Health Care Proxies    There are no active Health Care Proxies on file.                      Patient Information  Admitted from:: Facility  Mental Status: Alert  During Assessment patient was accompanied by: Daughter  Assessment information provided by:: Patient, Daughter  Primary Caregiver: Self  Support Systems: Family members  Type of Current Residence: Facility  Living Arrangements: Other (Comment) ([facility])                                   DISCHARGE DETAILS:    Discharge planning discussed with:: patient and pt's dtr Ynes  Freedom of Choice: Yes  Comments - Freedom of Choice: CM f/u with pt and dtr at bedside re: dcp as pt admitted from [facility]. Pt and  dtr confirmed dcp for return to [facility] when ready for d/c. Referral sent to [facility] for facility return when pt medically stable, awaiting response.  CM contacted family/caregiver?: Yes  Were Treatment Team discharge recommendations reviewed with patient/caregiver?: Yes  Did patient/caregiver verbalize understanding of patient care needs?: N/A- going to facility  Were patient/caregiver advised of the risks associated with not following Treatment Team discharge recommendations?: Yes    Contacts  Patient Contacts: Ynes (dtr)  Relationship to Patient:: Family  Contact Method: In Person  Reason/Outcome: Continuity of Care, Emergency Contact, Referral, Discharge Planning    Requested Home Health Care         Is the patient interested in HHC at discharge?: No    DME Referral Provided  Referral made for DME?: No    Other Referral/Resources/Interventions Provided:  Interventions: Short Term Rehab  Referral Comments: Referral sent to [facility] via aidin, awaiting response.         Treatment Team Recommendation: Short Term Rehab  Discharge Destination Plan:: Short Term Rehab  Transport at Discharge : Wheelchair van

## 2024-08-07 NOTE — PLAN OF CARE
Problem: Potential for Falls  Goal: Patient will remain free of falls  Description: INTERVENTIONS:  - Educate patient/family on patient safety including physical limitations  - Instruct patient to call for assistance with activity   - Consult OT/PT to assist with strengthening/mobility   - Keep Call bell within reach  - Keep bed low and locked with side rails adjusted as appropriate  - Keep care items and personal belongings within reach  - Initiate and maintain comfort rounds  - Make Fall Risk Sign visible to staff  - Offer Toileting every 2 Hours, in advance of need  - Initiate/Maintain alarm  - Obtain necessary fall risk management equipment:   - Apply yellow socks and bracelet for high fall risk patients  - Consider moving patient to room near nurses station  8/7/2024 1335 by Bárbara Christine, RN  Outcome: Progressing  8/7/2024 1335 by Bárbara Crhistine, RN  Outcome: Progressing

## 2024-08-07 NOTE — ASSESSMENT & PLAN NOTE
Recent Labs     08/06/24  1405   CREATININE 1.38*   EGFR 36     Estimated Creatinine Clearance: 30.5 mL/min (A) (by C-G formula based on SCr of 1.38 mg/dL (H)).    POA 1.38; (baseline 1.1-1.28)  Likely secondary to fluid overload due to being under diuresed at home with 10 mg torsemide daily.    Plan:  IV Lasix 40 mg twice daily  Monitor BMP, IV fluids held  Avoid hypoperfusion of the kidneys, minimize nephrotoxins

## 2024-08-07 NOTE — CASE MANAGEMENT
Case Management Progress Note    Patient name Farnaz Martin  Location S /S -01 MRN [id number]  : 1945 Date 2024       LOS (days): 1  Geometric Mean LOS (GMLOS) (days): 3.9  Days to GMLOS:3        OBJECTIVE:        Current admission status: Inpatient  Preferred Pharmacy:   [id number] - [place] - [address]  [address]  [place] [address]  Phone: [phone] Fax: [phone]    [facility] [address] [facility] [place]  [address] [facility]  [address]  [place] [address]  Phone: [phone] Fax: [phone]    Primary Care Provider: Naveen Monsivais MD    Primary Insurance: MEDICARE  Secondary Insurance: AARP    PROGRESS NOTE:    Response received from [facility] via aidin, confirmed pt is a bedhold at facility and can return when medically stable for d/c. CM to continue to follow for coordination of pt transport to [facility] when ready for d/c.

## 2024-08-07 NOTE — PROGRESS NOTES
ADT alert received the patient admitted 8/6/24 to [facility]. This Admin Coordinator will continue to monitor via chart review.

## 2024-08-08 LAB
ANION GAP SERPL CALCULATED.3IONS-SCNC: 9 MMOL/L (ref 4–13)
BASOPHILS # BLD AUTO: 0.07 THOUSANDS/ÂΜL (ref 0–0.1)
BASOPHILS NFR BLD AUTO: 1 % (ref 0–1)
BUN SERPL-MCNC: 35 MG/DL (ref 5–25)
CALCIUM SERPL-MCNC: 9 MG/DL (ref 8.4–10.2)
CHLORIDE SERPL-SCNC: 98 MMOL/L (ref 96–108)
CO2 SERPL-SCNC: 29 MMOL/L (ref 21–32)
CREAT SERPL-MCNC: 1.39 MG/DL (ref 0.6–1.3)
EOSINOPHIL # BLD AUTO: 0.3 THOUSAND/ÂΜL (ref 0–0.61)
EOSINOPHIL NFR BLD AUTO: 5 % (ref 0–6)
ERYTHROCYTE [DISTWIDTH] IN BLOOD BY AUTOMATED COUNT: 14.5 % (ref 11.6–15.1)
GFR SERPL CREATININE-BSD FRML MDRD: 35 ML/MIN/1.73SQ M
GLUCOSE SERPL-MCNC: 122 MG/DL (ref 65–140)
GLUCOSE SERPL-MCNC: 163 MG/DL (ref 65–140)
GLUCOSE SERPL-MCNC: 166 MG/DL (ref 65–140)
GLUCOSE SERPL-MCNC: 96 MG/DL (ref 65–140)
GLUCOSE SERPL-MCNC: 98 MG/DL (ref 65–140)
HCT VFR BLD AUTO: 34.7 % (ref 34.8–46.1)
HGB BLD-MCNC: 11.1 G/DL (ref 11.5–15.4)
IMM GRANULOCYTES # BLD AUTO: 0.03 THOUSAND/UL (ref 0–0.2)
IMM GRANULOCYTES NFR BLD AUTO: 1 % (ref 0–2)
LYMPHOCYTES # BLD AUTO: 1.75 THOUSANDS/ÂΜL (ref 0.6–4.47)
LYMPHOCYTES NFR BLD AUTO: 28 % (ref 14–44)
MCH RBC QN AUTO: 28.9 PG (ref 26.8–34.3)
MCHC RBC AUTO-ENTMCNC: 32 G/DL (ref 31.4–37.4)
MCV RBC AUTO: 90 FL (ref 82–98)
MONOCYTES # BLD AUTO: 0.8 THOUSAND/ÂΜL (ref 0.17–1.22)
MONOCYTES NFR BLD AUTO: 13 % (ref 4–12)
NEUTROPHILS # BLD AUTO: 3.28 THOUSANDS/ÂΜL (ref 1.85–7.62)
NEUTS SEG NFR BLD AUTO: 52 % (ref 43–75)
NRBC BLD AUTO-RTO: 0 /100 WBCS
PLATELET # BLD AUTO: 255 THOUSANDS/UL (ref 149–390)
PMV BLD AUTO: 10.5 FL (ref 8.9–12.7)
POTASSIUM SERPL-SCNC: 3.6 MMOL/L (ref 3.5–5.3)
RBC # BLD AUTO: 3.84 MILLION/UL (ref 3.81–5.12)
SODIUM SERPL-SCNC: 136 MMOL/L (ref 135–147)
WBC # BLD AUTO: 6.23 THOUSAND/UL (ref 4.31–10.16)

## 2024-08-08 PROCEDURE — 94760 N-INVAS EAR/PLS OXIMETRY 1: CPT

## 2024-08-08 PROCEDURE — 82948 REAGENT STRIP/BLOOD GLUCOSE: CPT

## 2024-08-08 PROCEDURE — 94640 AIRWAY INHALATION TREATMENT: CPT

## 2024-08-08 PROCEDURE — 80048 BASIC METABOLIC PNL TOTAL CA: CPT

## 2024-08-08 PROCEDURE — 99232 SBSQ HOSP IP/OBS MODERATE 35: CPT | Performed by: INTERNAL MEDICINE

## 2024-08-08 PROCEDURE — 85025 COMPLETE CBC W/AUTO DIFF WBC: CPT

## 2024-08-08 RX ORDER — LEVALBUTEROL INHALATION SOLUTION 1.25 MG/3ML
1.25 SOLUTION RESPIRATORY (INHALATION)
Status: DISCONTINUED | OUTPATIENT
Start: 2024-08-08 | End: 2024-08-19

## 2024-08-08 RX ORDER — LEVALBUTEROL INHALATION SOLUTION 1.25 MG/3ML
1.25 SOLUTION RESPIRATORY (INHALATION)
Status: DISCONTINUED | OUTPATIENT
Start: 2024-08-08 | End: 2024-08-08

## 2024-08-08 RX ADMIN — FLECAINIDE ACETATE 150 MG: 50 TABLET ORAL at 09:00

## 2024-08-08 RX ADMIN — APIXABAN 5 MG: 5 TABLET, FILM COATED ORAL at 09:01

## 2024-08-08 RX ADMIN — LEVALBUTEROL HYDROCHLORIDE 1.25 MG: 1.25 SOLUTION RESPIRATORY (INHALATION) at 19:46

## 2024-08-08 RX ADMIN — FAMOTIDINE 20 MG: 20 TABLET, FILM COATED ORAL at 09:00

## 2024-08-08 RX ADMIN — EZETIMIBE 10 MG: 10 TABLET ORAL at 21:52

## 2024-08-08 RX ADMIN — LEVALBUTEROL HYDROCHLORIDE 1.25 MG: 1.25 SOLUTION RESPIRATORY (INHALATION) at 07:36

## 2024-08-08 RX ADMIN — BENZONATATE 100 MG: 100 CAPSULE ORAL at 16:23

## 2024-08-08 RX ADMIN — INSULIN GLARGINE 10 UNITS: 100 INJECTION, SOLUTION SUBCUTANEOUS at 21:57

## 2024-08-08 RX ADMIN — ZOLPIDEM TARTRATE 10 MG: 5 TABLET, COATED ORAL at 21:52

## 2024-08-08 RX ADMIN — APIXABAN 5 MG: 5 TABLET, FILM COATED ORAL at 17:37

## 2024-08-08 RX ADMIN — GUAIFENESIN AND DEXTROMETHORPHAN 10 ML: 100; 10 SYRUP ORAL at 11:36

## 2024-08-08 RX ADMIN — Medication 1000 UNITS: at 09:01

## 2024-08-08 RX ADMIN — FLECAINIDE ACETATE 150 MG: 50 TABLET ORAL at 17:37

## 2024-08-08 RX ADMIN — METOPROLOL SUCCINATE 75 MG: 50 TABLET, EXTENDED RELEASE ORAL at 16:22

## 2024-08-08 RX ADMIN — METOPROLOL SUCCINATE 75 MG: 50 TABLET, EXTENDED RELEASE ORAL at 05:37

## 2024-08-08 RX ADMIN — LEVALBUTEROL HYDROCHLORIDE 1.25 MG: 1.25 SOLUTION RESPIRATORY (INHALATION) at 13:54

## 2024-08-08 RX ADMIN — ROPINIROLE HYDROCHLORIDE 2 MG: 1 TABLET, FILM COATED ORAL at 21:52

## 2024-08-08 RX ADMIN — ACETAMINOPHEN 650 MG: 325 TABLET, FILM COATED ORAL at 16:22

## 2024-08-08 RX ADMIN — BUMETANIDE 1 MG: 0.25 INJECTION INTRAMUSCULAR; INTRAVENOUS at 09:00

## 2024-08-08 RX ADMIN — FAMOTIDINE 20 MG: 20 TABLET, FILM COATED ORAL at 17:37

## 2024-08-08 RX ADMIN — DILTIAZEM HYDROCHLORIDE 120 MG: 120 CAPSULE, COATED, EXTENDED RELEASE ORAL at 09:00

## 2024-08-08 RX ADMIN — GUAIFENESIN AND DEXTROMETHORPHAN 10 ML: 100; 10 SYRUP ORAL at 14:59

## 2024-08-08 RX ADMIN — OXYCODONE HYDROCHLORIDE AND ACETAMINOPHEN 500 MG: 500 TABLET ORAL at 09:01

## 2024-08-08 RX ADMIN — GABAPENTIN 300 MG: 300 CAPSULE ORAL at 21:52

## 2024-08-08 RX ADMIN — BUMETANIDE 1 MG: 0.25 INJECTION INTRAMUSCULAR; INTRAVENOUS at 17:37

## 2024-08-08 NOTE — PROGRESS NOTES
Progress Note - Pulmonary   Farnaz Martin 79 y.o. female MRN: [id number]  Unit/Bed#: S -01 Encounter: [id number]      Assessment:      Acute on Chronic/ Refractory Congestive Heart Failure (preserved EF)          - Patient was previously discharged from [facility]. Thoracentesis removed 1 liter of transudative             fluid. On history patient has had previous multiple thoracentesis, transudate in etiology.           -Patient was discharged and compliant with 10 mg torsemide. Patient required 20 mg torsemide at her rehab facility.          - Chest xray on this admission posttive for persistent right pleural effusion.          - Patient received lasix 40 mg IV in the ED         -Inpatient medication: BUMEX 1 mg injection received this morning at 9 am. Second at 1800 hours. Creatinine remains            stable.        - On physical exam not volume overloaded.         - Net I/O since admission -1,405.           Plan   -Continue cardiology recommendations BUMEX 1 mg injection. Strict I's and O's and daily weights. Monitor Creatinine.  -Risks vs benefits were discussed with patient in terms of considering repeat thoracentesis or placing a pleural catheter.  Patient reported that since she feels better since she is risk of infection catheter she declined at this time.  Patient  is agreeable to considering singular thoracentesis if needed in the future.   Depending on the quality of improvement and timing to recurrence, would only consider indwelling pleural catheter at that time. Patient's daughter is agreeable to this management plan as well.     2.  Chronic Productive Cough secondary to Bronchiectasis       - Patient has a baseline history of chronic reproductive cough yellow to white in color attributed to her diagnosis of                  Bronchiectasis.               -Patient has been satting well off of nasal cannula just on room air overnight range has been from 90 to 95% SpO2.     Plan    -Continue XOPENEX inhalation solution 1.25 mg   -Continue home inhalers and nebulizer treatment on discharge.   -Possible home oxygen evaluation      3. Goals of Care      Plan   -On previous admission goals of care discussion were started for considering hospice   -As per primary team.        Subjective:     Overnight the patient went to use the restroom.  After using the restroom patient felt slightly short of breath and lightheaded. She was able to walk back from the restroom to her bed.  After resting for 5 minutes her shortness of breath and lightheadedness resolved.  She was able to fall back asleep.  No other overnight events. Denied any  episodes of shortness of breath, chest tightness, lightheadedness for the rest of the night.     Patient was examined at bedside.  She was sitting up and resting.  No signs of respiratory distress.  She just had breakfast. Continues to have intermittent productive coughs yellow-white in color. Denies chest pain or SOB during coughing episodes. Denies dysuria or urinary retention.     Patient requested that we reach out to her daughter again to update her on the plan.  The team updated Ms. Quick on the management plan. All questions and concerns were addressed.       Objective:     We reviewed    Vitals: Blood pressure 115/56, pulse 61, temperature 98.4 °F (36.9 °C), temperature source Oral, resp. rate 17, weight 73.8 kg (162 lb 11.2 oz), last menstrual period 02/01/1990, SpO2 90%, not currently breastfeeding.,  Dry, Body mass index is 29.76 kg/m².      Intake/Output Summary (Last 24 hours) at 8/8/2024 1024  Last data filed at 8/8/2024 1010  Gross per 24 hour   Intake 850 ml   Output 1800 ml   Net -950 ml     Net I/O since admission -1,405.     Physical Exam  General: Patient was in no respiratory distress she was sitting up on her chair. Awake alert and oriented x 3, conversant without conversational dyspnea, NAD, normal affect  HEENT:  PERRL, Sclera noninjected,  nonicteric OU, Nares patent, no nasal flaring, no nasal drainage, Mucous membranes, moist, no oral lesions, normal dentition  NECK: Trachea midline, no accessory muscle use, no stridor, no cervical or supraclavicular adenopathy, JVP not elevated  CARDIAC: Reg, single s1/S2, no m/r/g  PULM:  No visible respiratory distress. Pulse oximeter reading was not accurate attributed to patient washing her hands with pulse oximetry reading on fingers.  Pulse symmetry waveform was not accurate it was recalibrating at start of encounter.  At end of physical exam pulse oximeter reading was between 90 to 91% on room air patient.  Vesicular breath sounds appreciated with intermittent crackles cruciated bilaterally during lung examination.  Deep inhale and exhale provokes recurrent coughing and patient.  No wheezing or rhonchi.   CHEST: No gross deformities, equal chest expansion on inspiration bilaterally  ABD: Normoactive bowel sounds, soft nontender, nondistended, no rebound, no rigidity, no guarding  : No machado catheter.   EXT: No cyanosis, no clubbing, no edema, normal capillary refill  SKIN: Left knee replacement surgery scar appreciated.  +1 pitting edema of left leg.  No lower extremity edema of right leg.. No rashes, no lesions.   NEURO: no focal neurologic deficits, AAOx3, moving all extremities appropriately.     Labs: I have personally reviewed pertinent lab results.   Laboratory and Diagnostics  Results from last 7 days   Lab Units 08/08/24 0546 08/07/24  0528 08/06/24  1405   WBC Thousand/uL 6.23 5.70 5.89   HEMOGLOBIN g/dL 11.1* 10.7* 11.5   HEMATOCRIT % 34.7* 33.3* 36.3   PLATELETS Thousands/uL 255 238 267   SEGS PCT % 52 56 67   MONO PCT % 13* 13* 10   EOS PCT % 5 4 3     Results from last 7 days   Lab Units 08/08/24  0546 08/07/24  0528 08/06/24  1405   SODIUM mmol/L 136 136 135   POTASSIUM mmol/L 3.6 3.5 4.3   CHLORIDE mmol/L 98 98 98   CO2 mmol/L 29 30 29   ANION GAP mmol/L 9 8 8   BUN mg/dL 35* 31* 31*    CREATININE mg/dL 1.39* 1.32* 1.38*   CALCIUM mg/dL 9.0 8.8 9.0   GLUCOSE RANDOM mg/dL 98 137 138   ALT U/L  --   --  39   AST U/L  --   --  36   ALK PHOS U/L  --   --  70   ALBUMIN g/dL  --   --  3.7   TOTAL BILIRUBIN mg/dL  --   --  0.87                                             ABG:       IL-6 Level - N/A    Microbiology: N/A      Imaging and other studies: I have personally reviewed pertinent reports.      Last PFTs were done in 5/25/2024.  7/27/2024: CT of the chest which showed mild basilar bronchiectasis and pulmonary function testing which was normal with the exception of mild diffusion impairment.   EKG done on 8/6/2024: AV dual-paced rhythm Ventricular rate 62. Compared to EKG done on 27-JUL-2024 03:05,  Vent. rate has decreased BY  14 BPM    Chest Xray: Persistent right sided pleural effusion. Improvement from previous admission where xray showed a more pronounced bilateral pleural effusion.       Leslie Barbosa M.D. PGY1

## 2024-08-08 NOTE — PLAN OF CARE

## 2024-08-08 NOTE — PROGRESS NOTES
General Cardiology   Progress Note -  [facility] One   Farnaz Martin 79 y.o. female MRN: [id number]    Unit/Bed#: S -01 Encounter: [id number]    Assessment  1. Acute on chronic HFpEF  -BNP level 1091  -Outpatient diuretic:torsemide 10 mg daily.  -Inpatient diuretic regimen; IV Bumex 1 mg BID  -24-hour I&O balance; -905 ml  -Weight: 167 lbs per standing scale on 8/6, 162 lbs today.   -Dry weight: recently 158-160 lbs  2. Recurrent right sided pleural effusion  -S/p prior thoracentesis procedures.   -CXR in the ED demonstrated mild pulmonary edema, stable R>L pleural effusions  -Pulmonary following, no recommendation at this time for thoracentesis. Patient remains on RA w/on sats in the low 90s.   3. Paroxysmal atrial fibrillation/flutter and PAT  4. SSS, PPM in situ.  -Prior atrial fibrillation/flutter ablation in the past.  -ECG on admission demonstrated AV paced rhythm.  -Device interrogation 6/17/2024; AT/AF BURDEN SINCE 5/24/24-2.4%.   -On Cardizem  mg daily, flecainide 150 mg twice daily, and metoprolol succinate 75 mg BID  -On Eliquis 5 mg BID  5. HTN  -BP last recorded at 115/56  -On Cardizem  mg daily, metoprolol succinate 75 mg BID  6. HLD  -Lipid profile 7/25/2024; cholesterol 79, triglyceride 55, HDL 28 and LDL calculated 40.  -On Zetia 10 mg daily.  7. Type 2 DM- A1c 8.8%  8. CKD stage III  -Baseline creatinine 0.8-1.1, recent discharge (7/29) creat level was 1.29  -Creat 1.38 on admission, currently 1.32     Plan  -Pt w/ongoing c/o occasional non productive cough (somewhat of a chronic condition, has underlying bronchiectasis), no SOB at rest but still mildly JOHNSON. No O2 requirements. Still examines at least mildly volume overloaded. Seems to be doing well with current IV diuretic regimen. Continue Bumex 1 mg BID.   -Per pulm no indication at this time to proceed w/thora  -Continue Cardizem  mg daily, flecainide 150 mg twice daily, and metoprolol succinate 75 mg BID  -Continue Zetia 10  mg daily.   -Continue Eliquis 5 mg BID  -No indication for telemetry.     Subjective  Review of Systems   Constitutional: Negative for chills, fever and malaise/fatigue.   Eyes:  Negative for visual disturbance.   Cardiovascular:  Positive for dyspnea on exertion. Negative for chest pain, leg swelling, orthopnea and palpitations.   Respiratory:  Positive for cough. Negative for shortness of breath.    Gastrointestinal:  Negative for abdominal pain.   Neurological:  Negative for dizziness, headaches and light-headedness.       Objective:   Physical Exam  Vitals and nursing note reviewed.   Constitutional:       General: She is not in acute distress.     Appearance: She is not diaphoretic.   HENT:      Head: Normocephalic.   Eyes:      General: No scleral icterus.  Cardiovascular:      Rate and Rhythm: Normal rate and regular rhythm.      Pulses: Normal pulses.      Heart sounds: Normal heart sounds.   Pulmonary:      Effort: Pulmonary effort is normal.      Breath sounds: Rales present. No wheezing.      Comments: RLL fine crackles.   Abdominal:      Tenderness: There is no abdominal tenderness.   Musculoskeletal:      Right lower leg: No edema.      Left lower leg: No edema.   Skin:     General: Skin is warm and dry.      Capillary Refill: Capillary refill takes less than 2 seconds.   Neurological:      General: No focal deficit present.      Mental Status: She is alert and oriented to person, place, and time.   Psychiatric:         Mood and Affect: Mood normal.         Vitals: Blood pressure 115/56, pulse 61, temperature 98.4 °F (36.9 °C), temperature source Oral, resp. rate 17, weight 73.8 kg (162 lb 11.2 oz), last menstrual period 1990, SpO2 90%, not currently breastfeeding.,     Body mass index is 29.76 kg/m².,   Systolic (24hrs), Av , Min:102 , Max:117     Diastolic (24hrs), Av, Min:49, Max:69      Intake/Output Summary (Last 24 hours) at 2024 1051  Last data filed at 2024 1010  Gross per  24 hour   Intake 850 ml   Output 1800 ml   Net -950 ml     Weight (last 2 days)       Date/Time Weight    08/08/24 0600 73.8 (162.7)    08/07/24 0514 73.3 (161.6)    08/06/24 1521 75.9 (167.4)            LABORATORY RESULTS      CBC with diff:   Results from last 7 days   Lab Units 08/08/24  0546 08/07/24  0528 08/06/24  1405   WBC Thousand/uL 6.23 5.70 5.89   HEMOGLOBIN g/dL 11.1* 10.7* 11.5   HEMATOCRIT % 34.7* 33.3* 36.3   MCV fL 90 91 91   PLATELETS Thousands/uL 255 238 267   RBC Million/uL 3.84 3.66* 3.98   MCH pg 28.9 29.2 28.9   MCHC g/dL 32.0 32.1 31.7   RDW % 14.5 14.5 14.6   MPV fL 10.5 10.1 10.2   NRBC AUTO /100 WBCs 0 0 0       CMP:  Results from last 7 days   Lab Units 08/08/24  0546 08/07/24  0528 08/06/24  1405   POTASSIUM mmol/L 3.6 3.5 4.3   CHLORIDE mmol/L 98 98 98   CO2 mmol/L 29 30 29   BUN mg/dL 35* 31* 31*   CREATININE mg/dL 1.39* 1.32* 1.38*   CALCIUM mg/dL 9.0 8.8 9.0   AST U/L  --   --  36   ALT U/L  --   --  39   ALK PHOS U/L  --   --  70   EGFR ml/min/1.73sq m 35 38 36       BMP:  Results from last 7 days   Lab Units 08/08/24  0546 08/07/24  0528 08/06/24  1405   POTASSIUM mmol/L 3.6 3.5 4.3   CHLORIDE mmol/L 98 98 98   CO2 mmol/L 29 30 29   BUN mg/dL 35* 31* 31*   CREATININE mg/dL 1.39* 1.32* 1.38*   CALCIUM mg/dL 9.0 8.8 9.0       Lab Results   Component Value Date    NTBNP 761 (H) 01/08/2021    NTBNP 2,773 (H) 01/03/2021    NTBNP 506 (H) 03/25/2019                                Lipid Profile:   Lab Results   Component Value Date    CHOL 205 05/06/2015    CHOL 195 07/10/2014     Lab Results   Component Value Date    HDL 28 (L) 07/25/2024    HDL 46 (L) 01/09/2024    HDL 53 07/03/2023     Lab Results   Component Value Date    LDLCALC 40 07/25/2024    LDLCALC 64 01/09/2024    LDLCALC 96 07/03/2023     Lab Results   Component Value Date    TRIG 55 07/25/2024    TRIG 63 01/09/2024    TRIG 68 07/03/2023       Cardiac testing:   Results for orders placed during the hospital encounter of  21    Echo complete with contrast if indicated    [facility]  [address], [place] [address] [phone]    Transthoracic Echocardiogram  2D, M-mode, Doppler, and Color Doppler    Study date:  2021    Patient: TANISHA LEVINE  MR number: [id number]  Account number: [id number]  : 1945  Age: 75 years  Gender: Female  Status: Inpatient  Location: Bedside  Height: 62 in  Weight: 151.6 lb  BP: 126/ 94 mmHg    Indications: Atrial fibrillation    Diagnoses: I48.0 - Atrial fibrillation    Sonographer:  IGNACIO Cameron  Primary Physician:  Naveen Monsivais MD  Referring Physician:  Shantal Huff MD  Group:  [facility]  Interpreting Physician:  Kwabena Seymour MD    SUMMARY    LEFT VENTRICLE:  Systolic function was normal. Ejection fraction was estimated to be 55 %.  There were no regional wall motion abnormalities.  Wall thickness was at the upper limits of normal.  Doppler parameters were consistent with elevated ventricular end-diastolic filling pressure.    LEFT ATRIUM:  The atrium was mildly to moderately dilated.    RIGHT ATRIUM:  The atrium was mildly dilated.    MITRAL VALVE:  There was mild stenosis.    TRICUSPID VALVE:  There was mild to moderate regurgitation.  Pulmonary artery systolic pressure was mildly increased.    HISTORY: PRIOR HISTORY: HLD, HTN, PAF, chronic CHF, thyroid nodule    PROCEDURE: The procedure was performed at the bedside. This was a routine study. The transthoracic approach was used. The study included complete 2D imaging, M-mode, complete spectral Doppler, and color Doppler. The heart rate was 101 bpm,  at the start of the study. Images were obtained from the parasternal, apical, subcostal, and suprasternal notch acoustic windows. Echocardiographic views were limited due to lung interference. This was a technically difficult study.    LEFT VENTRICLE: Size was normal. Systolic function was normal. Ejection  fraction was estimated to be 55 %. There were no regional wall motion abnormalities. Wall thickness was at the upper limits of normal. DOPPLER: Transmitral flow  pattern: atrial fibrillation. Left ventricular diastolic function parameters were abnormal. Doppler parameters were consistent with elevated ventricular end-diastolic filling pressure.    RIGHT VENTRICLE: The size was normal. Systolic function was normal. Wall thickness was normal.    LEFT ATRIUM: The atrium was mildly to moderately dilated.    RIGHT ATRIUM: The atrium was mildly dilated.    MITRAL VALVE: Valve structure was normal. There was normal leaflet separation. DOPPLER: The transmitral velocity was within the normal range. There was mild stenosis. There was no significant regurgitation.    AORTIC VALVE: The valve was trileaflet. Leaflets exhibited normal thickness and normal cuspal separation. DOPPLER: Transaortic velocity was within the normal range. There was no evidence for stenosis. There was no significant  regurgitation.    TRICUSPID VALVE: The valve structure was normal. There was normal leaflet separation. DOPPLER: The transtricuspid velocity was within the normal range. There was no evidence for stenosis. There was mild to moderate regurgitation. Pulmonary  artery systolic pressure was mildly increased.    PULMONIC VALVE: Leaflets exhibited normal thickness, no calcification, and normal cuspal separation. DOPPLER: The transpulmonic velocity was within the normal range. There was no significant regurgitation.    PERICARDIUM: There was no pericardial effusion. The pericardium was normal in appearance.    AORTA: The root exhibited normal size.    SYSTEMIC VEINS: IVC: The inferior vena cava was normal in size.    PULMONARY VEINS: DOPPLER: Doppler signals were not recordable in the pulmonary vein(s).    SYSTEM MEASUREMENT TABLES    2D  %FS: 37.15 %  Ao Diam: 2.74 cm  Ao asc: 2.72 cm  EDV(Teich): 103.17 ml  EF(Teich): 67.06 %  ESV(Teich): 33.98  ml  IVSd: 0.94 cm  LA Area: 13.2 cm2  LA Diam: 3.98 cm  LVEDV MOD A4C: 32.01 ml  LVEF MOD A4C: 63.74 %  LVESV MOD A4C: 11.61 ml  LVIDd: 4.72 cm  LVIDs: 2.96 cm  LVLd A4C: 5.9 cm  LVLs A4C: 4.79 cm  LVPWd: 0.93 cm  RA Area: 8.67 cm2  RVIDd: 3.35 cm  SV MOD A4C: 20.4 ml  SV(Teich): 69.19 ml    CW  TR MaxP.39 mmHg  TR Vmax: 2.89 m/s    MM  TAPSE: 1.51 cm    [facility] Accredited Echocardiography Laboratory    Prepared and electronically signed by    Kwabena Seymour MD  Signed 2021 11:05:34    No results found for this or any previous visit.    No results found for this or any previous visit.    No valid procedures specified.  Results for orders placed during the hospital encounter of 10/05/22    NM myocardial perfusion spect (stress and/or rest)    Interpretation Summary    Resting ECG: ECG is abnormal. Resting ECG shows no ST-segment deviation. Patient a paced and V sensed.    Perfusion Defect Conclusion: The stress/rest perfusion ratio is 1.04 . There is no evidence of transient ischemic dilation (TID).    Perfusion: There is a left ventricular perfusion defect that is small in size with mild reduction in uptake present in the mid to apical anterior and anteroseptal location(s) that is predominantly fixed. The defect appears to be an artifact caused by breast attenuation.    Stress Function: Left ventricular function post-stress is normal. Post-stress ejection fraction is 70 %.    Negative exercise pharmacological stress test      Meds/Allergies   all current active meds have been reviewed and current meds:   Current Facility-Administered Medications   Medication Dose Route Frequency    acetaminophen (TYLENOL) tablet 650 mg  650 mg Oral Q8H PRN    albuterol (PROVENTIL HFA,VENTOLIN HFA) inhaler 2 puff  2 puff Inhalation Q6H PRN    apixaban (ELIQUIS) tablet 5 mg  5 mg Oral BID    ascorbic acid (VITAMIN C) tablet 500 mg  500 mg Oral Daily    benzonatate (TESSALON PERLES) capsule 100 mg  100 mg  Oral TID PRN    bumetanide (BUMEX) injection 1 mg  1 mg Intravenous BID    Cholecalciferol (VITAMIN D3) tablet 1,000 Units  1,000 Units Oral Daily    dextromethorphan-guaiFENesin (ROBITUSSIN DM) oral syrup 10 mL  10 mL Oral Q4H PRN    diltiazem (CARDIZEM CD) 24 hr capsule 120 mg  120 mg Oral Daily    ezetimibe (ZETIA) tablet 10 mg  10 mg Oral HS    famotidine (PEPCID) tablet 20 mg  20 mg Oral BID    flecainide (TAMBOCOR) tablet 150 mg  150 mg Oral BID    gabapentin (NEURONTIN) capsule 300 mg  300 mg Oral HS    insulin glargine (LANTUS) subcutaneous injection 10 Units 0.1 mL  10 Units Subcutaneous HS    insulin lispro (HumALOG/ADMELOG) 100 units/mL subcutaneous injection 1-5 Units  1-5 Units Subcutaneous TID AC    levalbuterol (XOPENEX) inhalation solution 1.25 mg  1.25 mg Nebulization TID    loratadine (CLARITIN) tablet 10 mg  10 mg Oral Daily    metoprolol succinate (TOPROL-XL) 24 hr tablet 75 mg  75 mg Oral Q12H    rOPINIRole (REQUIP) tablet 2 mg  2 mg Oral HS    zolpidem (AMBIEN) tablet 10 mg  10 mg Oral HS          Counseling / Coordination of Care  Total floor / unit time spent today 20 minutes.  Greater than 50% of total time was spent with the patient and / or family counseling and / or coordination of care.      ** Please Note: Dragon 360 Dictation voice to text software may have been used in the creation of this document. **

## 2024-08-08 NOTE — ASSESSMENT & PLAN NOTE
Lab Results   Component Value Date    LVEF 50 07/14/2024    BNP 1,091 (H) 08/06/2024    BNP 1,428 (H) 07/24/2024     Acute and chronic respiratory failure with hypoxia 2/2 CHF exacerbation a/e/b hypoxia, increased oxygen demands requiring CXR, Nebs, IV Lasix and 4L NC oxygen.    Presents with increased shortness of breath, dyspnea on exertion, lower extremity edema, and cough with productive sputum  Patient is currently volume overloaded.  3 to 4-day history of progressively worsening dyspnea, dry cough, worsening lower extremity edema.  She required increased oxygen when EMS arrived. difficulty talking normal sentences secondary to shortness of breath.  She is now unable to lie flat.  She was discharged on recent admission with 10 mg torsemide daily  Findings: ED provider: She is at baseline on 2 L O2 supplemental O2 and, because of hypoxia to the 80s, was bumped up to 4.  On arrival 97% on 4L (baseline chronic 2L NC)  Signs and symptoms indicative of acute CHF exacerbation secondary to under diuresis.  Patient was initially on 2 L of oxygen, presents with increased work of breathing, dry cough, + hepatojular reflux, appeared volume overloaded.  Currently pt is on room air saturating appopriately for her, diuresed approximately 1.75L of fluid, improving SOB      Plan:  GDMT:  Diuretic: iv Bumex 1mg BID per cardiology reccs, B-Blocker: Toprol  Cardiac diet, sodium restriction  CMP, magnesium tomorrow a.m; Goal Mg > 2 and K > 4; Replete prn  Daily standing weights, Measure I/O   Consult cardiology   Most recent Echo 50% LVEF     Wt Readings from Last 3 Encounters:   08/07/24 73.3 kg (161 lb 9.6 oz)   07/29/24 72 kg (158 lb 12.8 oz)   07/28/24 71.9 kg (158 lb 8.2 oz)

## 2024-08-08 NOTE — PROGRESS NOTES
[facility]  Progress Note  Name: Farnaz Martin I  MRN: [id number]  Unit/Bed#: S -01 I Date of Admission: 8/6/2024   Date of Service: 8/8/2024 I Hospital Day: 2    Assessment & Plan   * Acute on chronic heart failure (HCC)  Assessment & Plan    Lab Results   Component Value Date    LVEF 50 07/14/2024    BNP 1,091 (H) 08/06/2024    BNP 1,428 (H) 07/24/2024     Acute and chronic respiratory failure with hypoxia 2/2 CHF exacerbation a/e/b hypoxia, increased oxygen demands requiring CXR, Nebs, IV Lasix and 4L NC oxygen.    Presents with increased shortness of breath, dyspnea on exertion, lower extremity edema, and cough with productive sputum  Patient is currently volume overloaded.  3 to 4-day history of progressively worsening dyspnea, dry cough, worsening lower extremity edema.  She required increased oxygen when EMS arrived. difficulty talking normal sentences secondary to shortness of breath.  She is now unable to lie flat.  She was discharged on recent admission with 10 mg torsemide daily  Findings: ED provider: She is at baseline on 2 L O2 supplemental O2 and, because of hypoxia to the 80s, was bumped up to 4.  On arrival 97% on 4L (baseline chronic 2L NC)  Signs and symptoms indicative of acute CHF exacerbation secondary to under diuresis.  Patient was initially on 2 L of oxygen, presents with increased work of breathing, dry cough, + hepatojular reflux, appeared volume overloaded.  Currently pt is on room air saturating appopriately for her, diuresed approximately 1.75L of fluid, improving SOB      Plan:  GDMT:  Diuretic: iv Bumex 1mg BID per cardiology reccs, B-Blocker: Toprol  Cardiac diet, sodium restriction  CMP, magnesium tomorrow a.m; Goal Mg > 2 and K > 4; Replete prn  Daily standing weights, Measure I/O   Consult cardiology   Most recent Echo 50% LVEF     Wt Readings from Last 3 Encounters:   08/07/24 73.3 kg (161 lb 9.6 oz)   07/29/24 72 kg (158 lb 12.8 oz)   07/28/24  71.9 kg (158 lb 8.2 oz)               Elevated serum creatinine  Assessment & Plan  Recent Labs     08/06/24  1405 08/07/24  0528   CREATININE 1.38* 1.32*   EGFR 36 38     Estimated Creatinine Clearance: 31.9 mL/min (A) (by C-G formula based on SCr of 1.32 mg/dL (H)).    POA 1.38; (baseline 1.1-1.28)  Likely secondary to fluid overload due to being under diuresed at home with 10 mg torsemide daily.    Plan:  IV Bumex 1mg twice daily  Monitor BMP, IV fluids held  Avoid hypoperfusion of the kidneys, minimize nephrotoxins    Cough  Assessment & Plan  Pt endorsing orthopnea, SOB  Productive in nature without production of sputum  Uses Spiriva inhaler and Robitussin at home for sx's  R/p chest x-ray shows increased congestion, persistent pleural effusion  Cough component likely CHF exacerbation  Pulmonology was consulted, recommended no procedural interventions at this time, but recommended   -Continue XOPENEX inhalation solution 1.25 mg   -Continue home inhalers and nebulizer treatment on discharge.   -Possible home oxygen evaluation     Plan:  Atrovent neb four times daily  XOPENEX inhalation solution 1.25 mg   Continue home inhalers and nebulizer treatment on discharge.   Possible home oxygen evaluation       Paroxysmal A-fib (HCC)  Assessment & Plan  Afib-  S/p ablation in 03/2021.   EKG- AV dual-paced rhythm, Vent. rate has decreased by 14 BPM since prior EKG  Low voltage QRS    Plan:  Eliquis 5 mg twice daily  Continue flecainide 150 mg twice daily and metoprolol succinate 75 mg twice daily  Continue diltiazem 120 mg daily           VTE Pharmacologic Prophylaxis: VTE Score: 5 High Risk (Score >/= 5) - Pharmacological DVT Prophylaxis Ordered: apixaban (Eliquis). Sequential Compression Devices Ordered.    Mobility:   Basic Mobility Inpatient Raw Score: 23  JH-HLM Goal: 7: Walk 25 feet or more  JH-HLM Achieved: 6: Walk 10 steps or more  JH-HLM Goal achieved. Continue to encourage appropriate mobility.    Patient  "Centered Rounds: I performed bedside rounds with nursing staff today.   Discussions with Specialists or Other Care Team Provider: cardiology, pulmonology    Education and Discussions with Family / Patient: Updated  (daughter) at bedside.    Total Time Spent on Date of Encounter in care of patient: 60 mins. This time was spent on one or more of the following: performing physical exam; counseling and coordination of care; obtaining or reviewing history; documenting in the medical record; reviewing/ordering tests, medications or procedures; communicating with other healthcare professionals and discussing with patient's family/caregivers.    Current Length of Stay: 2 day(s)  Current Patient Status: Inpatient   Certification Statement: The patient will continue to require additional inpatient hospital stay due to acute on chronic CHF  Discharge Plan: Anticipate discharge in 24-48 hrs to rehab facility.    Code Status: Level 3 - DNAR and DNI    Subjective:   Evaluated patient at bedside, she reports no events overnight.  She was resting comfortably in bed, saturating well on room air.  She denies any shortness of breath, fatigue or light headedness.  Her productive cough has improved in that she states it is \"not as intense\".  She reports improvements overall and she appears clinic better than yesterday.  She understands the plan of care going forward with all questions and concerns answered at bedside.     Objective:     Vitals:   Temp (24hrs), Av.1 °F (36.7 °C), Min:97.9 °F (36.6 °C), Max:98.4 °F (36.9 °C)    Temp:  [97.9 °F (36.6 °C)-98.4 °F (36.9 °C)] 98.4 °F (36.9 °C)  HR:  [60-69] 61  Resp:  [17] 17  BP: (102-117)/(56-69) 115/56  SpO2:  [90 %-97 %] 90 %  Body mass index is 29.76 kg/m².     Input and Output Summary (last 24 hours):     Intake/Output Summary (Last 24 hours) at 2024 1244  Last data filed at 2024 1100  Gross per 24 hour   Intake 850 ml   Output 1800 ml   Net -950 ml "       Physical Exam:   Physical Exam  Vitals and nursing note reviewed.   Constitutional:       General: She is not in acute distress.     Appearance: She is well-developed.   HENT:      Head: Normocephalic and atraumatic.   Eyes:      Conjunctiva/sclera: Conjunctivae normal.   Cardiovascular:      Rate and Rhythm: Normal rate and regular rhythm.      Heart sounds: No murmur heard.  Pulmonary:      Effort: Pulmonary effort is normal. No respiratory distress.      Breath sounds: Normal breath sounds.   Abdominal:      General: Bowel sounds are normal.      Palpations: Abdomen is soft.      Tenderness: There is no abdominal tenderness.   Musculoskeletal:         General: No swelling.      Cervical back: Neck supple.      Right lower leg: Edema (1+) present.      Left lower leg: Edema (1+) present.   Skin:     General: Skin is warm and dry.      Capillary Refill: Capillary refill takes less than 2 seconds.   Neurological:      Mental Status: She is alert and oriented to person, place, and time.   Psychiatric:         Mood and Affect: Mood normal.        Additional Data:     Labs:  Results from last 7 days   Lab Units 08/08/24  0546   WBC Thousand/uL 6.23   HEMOGLOBIN g/dL 11.1*   HEMATOCRIT % 34.7*   PLATELETS Thousands/uL 255   SEGS PCT % 52   LYMPHO PCT % 28   MONO PCT % 13*   EOS PCT % 5     Results from last 7 days   Lab Units 08/08/24  0546 08/07/24  0528 08/06/24  1405   SODIUM mmol/L 136   < > 135   POTASSIUM mmol/L 3.6   < > 4.3   CHLORIDE mmol/L 98   < > 98   CO2 mmol/L 29   < > 29   BUN mg/dL 35*   < > 31*   CREATININE mg/dL 1.39*   < > 1.38*   ANION GAP mmol/L 9   < > 8   CALCIUM mg/dL 9.0   < > 9.0   ALBUMIN g/dL  --   --  3.7   TOTAL BILIRUBIN mg/dL  --   --  0.87   ALK PHOS U/L  --   --  70   ALT U/L  --   --  39   AST U/L  --   --  36   GLUCOSE RANDOM mg/dL 98   < > 138    < > = values in this interval not displayed.         Results from last 7 days   Lab Units 08/08/24  1126 08/08/24  0730  08/07/24 2052 08/07/24  1731   POC GLUCOSE mg/dl 122 96 184* 155*               Lines/Drains:  Invasive Devices       Peripheral Intravenous Line  Duration             Peripheral IV 08/06/24 Left Antecubital 1 day                      Telemetry:  Telemetry Orders (From admission, onward)               24 Hour Telemetry Monitoring  Continuous x 24 Hours (Telem)        Expiring   Question:  Reason for 24 Hour Telemetry  Answer:  Arrhythmias requiring acute medical intervention / PPM or ICD malfunction                     Telemetry Reviewed:  discontinued  Indication for Continued Telemetry Use: per cardiology's recommendations             Imaging: Reviewed radiology reports from this admission including: chest xray    Recent Cultures (last 7 days):         Last 24 Hours Medication List:   Current Facility-Administered Medications   Medication Dose Route Frequency Provider Last Rate    acetaminophen  650 mg Oral Q8H PRN Steve Tripathi MD      albuterol  2 puff Inhalation Q6H PRN Steve Tripathi MD      apixaban  5 mg Oral BID Steve Tripathi MD      ascorbic acid  500 mg Oral Daily Steve Tripathi MD      benzonatate  100 mg Oral TID PRN Steve Tripathi MD      bumetanide  1 mg Intravenous BID JENNY Hou      Cholecalciferol  1,000 Units Oral Daily Steve Tripathi MD      dextromethorphan-guaiFENesin  10 mL Oral Q4H PRN Odin Castro MD      diltiazem  120 mg Oral Daily Steve Tripathi MD      ezetimibe  10 mg Oral HS Steve Tripathi MD      famotidine  20 mg Oral BID Steve Tripathi MD      flecainide  150 mg Oral BID Steve Triptahi MD      gabapentin  300 mg Oral HS Steve Tripathi MD      insulin glargine  10 Units Subcutaneous HS Steve Tripathi MD      insulin lispro  1-5 Units Subcutaneous TID AC Steve Tripathi MD      levalbuterol  1.25 mg Nebulization TID Fauzia Pennington MD      loratadine  10 mg Oral Daily Steve Tripathi MD      metoprolol succinate  75 mg Oral Q12H Steve Tripathi MD      rOPINIRole   2 mg Oral HS Steve Tripathi MD      zolpidem  10 mg Oral HS Steve Tripathi MD          Today, Patient Was Seen By: Steve Tripathi MD    **Please Note: This note may have been constructed using a voice recognition system.**

## 2024-08-08 NOTE — ASSESSMENT & PLAN NOTE
Recent Labs     08/06/24  1405 08/07/24  0528   CREATININE 1.38* 1.32*   EGFR 36 38     Estimated Creatinine Clearance: 31.9 mL/min (A) (by C-G formula based on SCr of 1.32 mg/dL (H)).    POA 1.38; (baseline 1.1-1.28)  Likely secondary to fluid overload due to being under diuresed at home with 10 mg torsemide daily.    Plan:  IV Bumex 1mg twice daily  Monitor BMP, IV fluids held  Avoid hypoperfusion of the kidneys, minimize nephrotoxins

## 2024-08-08 NOTE — ASSESSMENT & PLAN NOTE
Pt endorsing orthopnea, SOB  Productive in nature without production of sputum  Uses Spiriva inhaler and Robitussin at home for sx's  R/p chest x-ray shows increased congestion, persistent pleural effusion  Cough component likely CHF exacerbation  Pulmonology was consulted, recommended no procedural interventions at this time, but recommended   -Continue XOPENEX inhalation solution 1.25 mg   -Continue home inhalers and nebulizer treatment on discharge.   -Possible home oxygen evaluation     Plan:  Atrovent neb four times daily  XOPENEX inhalation solution 1.25 mg   Continue home inhalers and nebulizer treatment on discharge.   Possible home oxygen evaluation

## 2024-08-09 ENCOUNTER — PATIENT OUTREACH (OUTPATIENT)
Dept: CASE MANAGEMENT | Facility: OTHER | Age: 79
End: 2024-08-09

## 2024-08-09 ENCOUNTER — APPOINTMENT (INPATIENT)
Dept: RADIOLOGY | Facility: HOSPITAL | Age: 79
DRG: 291 | End: 2024-08-09
Payer: MEDICARE

## 2024-08-09 LAB
ANION GAP SERPL CALCULATED.3IONS-SCNC: 8 MMOL/L (ref 4–13)
BASOPHILS # BLD AUTO: 0.06 THOUSANDS/ÂΜL (ref 0–0.1)
BASOPHILS NFR BLD AUTO: 1 % (ref 0–1)
BUN SERPL-MCNC: 36 MG/DL (ref 5–25)
CALCIUM SERPL-MCNC: 8.8 MG/DL (ref 8.4–10.2)
CHLORIDE SERPL-SCNC: 98 MMOL/L (ref 96–108)
CO2 SERPL-SCNC: 30 MMOL/L (ref 21–32)
CREAT SERPL-MCNC: 1.38 MG/DL (ref 0.6–1.3)
EOSINOPHIL # BLD AUTO: 0.29 THOUSAND/ÂΜL (ref 0–0.61)
EOSINOPHIL NFR BLD AUTO: 5 % (ref 0–6)
ERYTHROCYTE [DISTWIDTH] IN BLOOD BY AUTOMATED COUNT: 14.4 % (ref 11.6–15.1)
GFR SERPL CREATININE-BSD FRML MDRD: 36 ML/MIN/1.73SQ M
GLUCOSE SERPL-MCNC: 120 MG/DL (ref 65–140)
GLUCOSE SERPL-MCNC: 164 MG/DL (ref 65–140)
GLUCOSE SERPL-MCNC: 166 MG/DL (ref 65–140)
GLUCOSE SERPL-MCNC: 62 MG/DL (ref 65–140)
GLUCOSE SERPL-MCNC: 81 MG/DL (ref 65–140)
HCT VFR BLD AUTO: 33 % (ref 34.8–46.1)
HGB BLD-MCNC: 10.5 G/DL (ref 11.5–15.4)
IMM GRANULOCYTES # BLD AUTO: 0.01 THOUSAND/UL (ref 0–0.2)
IMM GRANULOCYTES NFR BLD AUTO: 0 % (ref 0–2)
LYMPHOCYTES # BLD AUTO: 1.6 THOUSANDS/ÂΜL (ref 0.6–4.47)
LYMPHOCYTES NFR BLD AUTO: 28 % (ref 14–44)
MCH RBC QN AUTO: 28.8 PG (ref 26.8–34.3)
MCHC RBC AUTO-ENTMCNC: 31.8 G/DL (ref 31.4–37.4)
MCV RBC AUTO: 91 FL (ref 82–98)
MONOCYTES # BLD AUTO: 0.8 THOUSAND/ÂΜL (ref 0.17–1.22)
MONOCYTES NFR BLD AUTO: 14 % (ref 4–12)
NEUTROPHILS # BLD AUTO: 2.96 THOUSANDS/ÂΜL (ref 1.85–7.62)
NEUTS SEG NFR BLD AUTO: 52 % (ref 43–75)
NRBC BLD AUTO-RTO: 0 /100 WBCS
PLATELET # BLD AUTO: 225 THOUSANDS/UL (ref 149–390)
PMV BLD AUTO: 10.3 FL (ref 8.9–12.7)
POTASSIUM SERPL-SCNC: 3.3 MMOL/L (ref 3.5–5.3)
RBC # BLD AUTO: 3.64 MILLION/UL (ref 3.81–5.12)
SODIUM SERPL-SCNC: 136 MMOL/L (ref 135–147)
WBC # BLD AUTO: 5.72 THOUSAND/UL (ref 4.31–10.16)

## 2024-08-09 PROCEDURE — 94640 AIRWAY INHALATION TREATMENT: CPT

## 2024-08-09 PROCEDURE — 82948 REAGENT STRIP/BLOOD GLUCOSE: CPT

## 2024-08-09 PROCEDURE — 99232 SBSQ HOSP IP/OBS MODERATE 35: CPT | Performed by: INTERNAL MEDICINE

## 2024-08-09 PROCEDURE — 71045 X-RAY EXAM CHEST 1 VIEW: CPT

## 2024-08-09 PROCEDURE — 85025 COMPLETE CBC W/AUTO DIFF WBC: CPT

## 2024-08-09 PROCEDURE — 80048 BASIC METABOLIC PNL TOTAL CA: CPT

## 2024-08-09 PROCEDURE — 94760 N-INVAS EAR/PLS OXIMETRY 1: CPT

## 2024-08-09 PROCEDURE — 87081 CULTURE SCREEN ONLY: CPT | Performed by: INTERNAL MEDICINE

## 2024-08-09 RX ORDER — BUMETANIDE 0.25 MG/ML
2 INJECTION INTRAMUSCULAR; INTRAVENOUS 2 TIMES DAILY
Status: DISCONTINUED | OUTPATIENT
Start: 2024-08-09 | End: 2024-08-10

## 2024-08-09 RX ORDER — POTASSIUM CHLORIDE 1500 MG/1
40 TABLET, EXTENDED RELEASE ORAL ONCE
Status: COMPLETED | OUTPATIENT
Start: 2024-08-09 | End: 2024-08-09

## 2024-08-09 RX ORDER — BUMETANIDE 0.25 MG/ML
1 INJECTION INTRAMUSCULAR; INTRAVENOUS ONCE
Status: COMPLETED | OUTPATIENT
Start: 2024-08-09 | End: 2024-08-09

## 2024-08-09 RX ORDER — POTASSIUM CHLORIDE 1500 MG/1
20 TABLET, EXTENDED RELEASE ORAL 2 TIMES DAILY
Status: DISCONTINUED | OUTPATIENT
Start: 2024-08-09 | End: 2024-08-13

## 2024-08-09 RX ADMIN — Medication 1000 UNITS: at 08:22

## 2024-08-09 RX ADMIN — POTASSIUM CHLORIDE 20 MEQ: 1500 TABLET, EXTENDED RELEASE ORAL at 15:17

## 2024-08-09 RX ADMIN — BUMETANIDE 2 MG: 0.25 INJECTION INTRAMUSCULAR; INTRAVENOUS at 15:17

## 2024-08-09 RX ADMIN — APIXABAN 5 MG: 5 TABLET, FILM COATED ORAL at 08:23

## 2024-08-09 RX ADMIN — BENZONATATE 100 MG: 100 CAPSULE ORAL at 14:36

## 2024-08-09 RX ADMIN — FLECAINIDE ACETATE 150 MG: 50 TABLET ORAL at 17:27

## 2024-08-09 RX ADMIN — LEVALBUTEROL HYDROCHLORIDE 1.25 MG: 1.25 SOLUTION RESPIRATORY (INHALATION) at 19:33

## 2024-08-09 RX ADMIN — ACETAMINOPHEN 650 MG: 325 TABLET, FILM COATED ORAL at 15:17

## 2024-08-09 RX ADMIN — POTASSIUM CHLORIDE 40 MEQ: 1500 TABLET, EXTENDED RELEASE ORAL at 10:07

## 2024-08-09 RX ADMIN — EZETIMIBE 10 MG: 10 TABLET ORAL at 21:17

## 2024-08-09 RX ADMIN — DILTIAZEM HYDROCHLORIDE 120 MG: 120 CAPSULE, COATED, EXTENDED RELEASE ORAL at 08:23

## 2024-08-09 RX ADMIN — INSULIN LISPRO 1 UNITS: 100 INJECTION, SOLUTION INTRAVENOUS; SUBCUTANEOUS at 17:29

## 2024-08-09 RX ADMIN — BUMETANIDE 1 MG: 0.25 INJECTION INTRAMUSCULAR; INTRAVENOUS at 08:25

## 2024-08-09 RX ADMIN — GABAPENTIN 300 MG: 300 CAPSULE ORAL at 21:17

## 2024-08-09 RX ADMIN — BUMETANIDE 1 MG: 0.25 INJECTION INTRAMUSCULAR; INTRAVENOUS at 10:07

## 2024-08-09 RX ADMIN — METOPROLOL SUCCINATE 75 MG: 50 TABLET, EXTENDED RELEASE ORAL at 17:28

## 2024-08-09 RX ADMIN — LORATADINE 10 MG: 10 TABLET ORAL at 08:23

## 2024-08-09 RX ADMIN — GUAIFENESIN AND DEXTROMETHORPHAN 10 ML: 100; 10 SYRUP ORAL at 21:17

## 2024-08-09 RX ADMIN — APIXABAN 5 MG: 5 TABLET, FILM COATED ORAL at 17:27

## 2024-08-09 RX ADMIN — METOPROLOL SUCCINATE 75 MG: 50 TABLET, EXTENDED RELEASE ORAL at 05:30

## 2024-08-09 RX ADMIN — INSULIN GLARGINE 10 UNITS: 100 INJECTION, SOLUTION SUBCUTANEOUS at 21:17

## 2024-08-09 RX ADMIN — LEVALBUTEROL HYDROCHLORIDE 1.25 MG: 1.25 SOLUTION RESPIRATORY (INHALATION) at 13:50

## 2024-08-09 RX ADMIN — LEVALBUTEROL HYDROCHLORIDE 1.25 MG: 1.25 SOLUTION RESPIRATORY (INHALATION) at 08:35

## 2024-08-09 RX ADMIN — INSULIN LISPRO 1 UNITS: 100 INJECTION, SOLUTION INTRAVENOUS; SUBCUTANEOUS at 12:33

## 2024-08-09 RX ADMIN — GUAIFENESIN AND DEXTROMETHORPHAN 10 ML: 100; 10 SYRUP ORAL at 11:53

## 2024-08-09 RX ADMIN — FLECAINIDE ACETATE 150 MG: 50 TABLET ORAL at 08:23

## 2024-08-09 RX ADMIN — FAMOTIDINE 20 MG: 20 TABLET, FILM COATED ORAL at 17:27

## 2024-08-09 RX ADMIN — FAMOTIDINE 20 MG: 20 TABLET, FILM COATED ORAL at 08:23

## 2024-08-09 RX ADMIN — ZOLPIDEM TARTRATE 10 MG: 5 TABLET, COATED ORAL at 21:17

## 2024-08-09 RX ADMIN — OXYCODONE HYDROCHLORIDE AND ACETAMINOPHEN 500 MG: 500 TABLET ORAL at 08:23

## 2024-08-09 RX ADMIN — ROPINIROLE HYDROCHLORIDE 2 MG: 1 TABLET, FILM COATED ORAL at 21:17

## 2024-08-09 NOTE — ASSESSMENT & PLAN NOTE
Lab Results   Component Value Date    LVEF 50 07/14/2024    BNP 1,091 (H) 08/06/2024    BNP 1,428 (H) 07/24/2024     Acute and chronic respiratory failure with hypoxia 2/2 CHF exacerbation a/e/b hypoxia, increased oxygen demands requiring CXR, Nebs, IV Lasix and 4L NC oxygen.    Presents with increased shortness of breath, dyspnea on exertion, lower extremity edema, and cough with productive sputum  Patient is currently volume overloaded.  3 to 4-day history of progressively worsening dyspnea, dry cough, worsening lower extremity edema.  She required increased oxygen when EMS arrived. difficulty talking normal sentences secondary to shortness of breath.  She is now unable to lie flat.  She was discharged on recent admission with 10 mg torsemide daily  Findings: ED provider: She is at baseline on 2 L O2 supplemental O2 and, because of hypoxia to the 80s, was bumped up to 4.  On arrival 97% on 4L (baseline chronic 2L NC)  Signs and symptoms indicative of acute CHF exacerbation secondary to under diuresis.  Patient was initially on 2 L of oxygen, presents with increased work of breathing, dry cough, + hepatojular reflux, appeared volume overloaded.  Currently pt is on room air saturating appopriately for her, diuresed approximately 1.55L of fluid since admission, improving SOB       Plan:  Per cardiology: increase IV Bumex to 2 mg BID.   Start kdur 20 meq BID. , B-Blocker: Toprol  Cardiac diet, sodium restriction  CMP, magnesium a.m; Goal Mg > 2 and K > 4; Replete prn  Daily standing weights, Measure I/O    Most recent Echo 50% LVEF     Wt Readings from Last 3 Encounters:   08/08/24 73.8 kg (162 lb 11.2 oz)   07/29/24 72 kg (158 lb 12.8 oz)   07/28/24 71.9 kg (158 lb 8.2 oz)

## 2024-08-09 NOTE — PROGRESS NOTES
General Cardiology   Progress Note -  Team One   Farnaz Martin 79 y.o. female MRN: [id number]    Unit/Bed#: S -01 Encounter: [id number]    Assessment  1. Acute on chronic HFpEF  -BNP level 1091  -Outpatient diuretic:torsemide 10 mg daily.  -Inpatient diuretic regimen; IV Bumex 1 mg BID  -24-hour I&O balance; -290 ml, overall -1.7L  -Weight: 167 lbs per standing scale on 8/6, 162 lbs today, no change from 8/8   -Dry weight: recently 158-160 lbs  2. Recurrent right sided pleural effusion  -S/p prior thoracentesis procedures.   -CXR in the ED demonstrated mild pulmonary edema, stable R>L pleural effusions  -Pulmonary following, no recommendation at this time for thoracentesis. Patient remains on RA w/on sats in the low 90s.   3. Paroxysmal atrial fibrillation/flutter and PAT  4. SSS, PPM in situ.  -Prior atrial fibrillation/flutter ablation in the past.  -ECG on admission demonstrated AV paced rhythm.  -Device interrogation 6/17/2024; AT/AF BURDEN SINCE 5/24/24-2.4%.   -On Cardizem  mg daily, flecainide 150 mg twice daily, and metoprolol succinate 75 mg BID  -On Eliquis 5 mg BID  5. HTN  -BP last recorded at 115/56  -On Cardizem  mg daily, metoprolol succinate 75 mg BID  6. HLD  -Lipid profile 7/25/2024; cholesterol 79, triglyceride 55, HDL 28 and LDL calculated 40.  -On Zetia 10 mg daily.  7. Type 2 DM- A1c 8.8%  8. CKD stage III  -Baseline creatinine 0.8-1.1, recent discharge (7/29) creat level was 1.29  -Creat 1.38 on admission, currently 1.32     Plan  -Pt w/ongoing c/o occasional non productive cough (component of chronicity has underlying bronchiectasis), no SOB at rest but still still mildly JOHNSON. No O2 requirements.   -No significant net (-) fluid removal and no significant change in weight over the past 24 hrs. Examines volume overloaded this a.m, w/persistent RLL fine crackles, JVD, and mild B/L LE edema (slightly worse than yesterday). Will given an extra dose of IV Bumex 1 mg this am for  a total of 2 mg and then increase IV Bumex to 2 mg BID. Start kdur 20 meq BID.   -Per pulm no indication at this time to proceed w/thora but we will need to re-evaluate the degree of effusion once felt optimized from an IV diuretic perspective.   -Continue Cardizem  mg daily, flecainide 150 mg twice daily, and metoprolol succinate 75 mg BID  -Continue Zetia 10 mg daily.   -Continue Eliquis 5 mg BID  -No indication for telemetry.     Subjective  Review of Systems   Constitutional: Positive for malaise/fatigue. Negative for chills and fever.   Eyes:  Negative for visual disturbance.   Cardiovascular:  Positive for dyspnea on exertion and leg swelling. Negative for chest pain, orthopnea and palpitations.   Respiratory:  Positive for cough. Negative for shortness of breath.    Gastrointestinal:  Negative for abdominal pain.   Neurological:  Negative for dizziness, headaches and light-headedness.       Objective:   Physical Exam  Vitals and nursing note reviewed.   Constitutional:       General: She is not in acute distress.     Appearance: She is not diaphoretic.   HENT:      Head: Normocephalic and atraumatic.   Eyes:      General: No scleral icterus.  Neck:      Comments: jvd  Cardiovascular:      Rate and Rhythm: Normal rate.      Pulses: Normal pulses.      Heart sounds: Normal heart sounds.   Pulmonary:      Effort: Pulmonary effort is normal.      Breath sounds: Normal breath sounds. No wheezing.      Comments: RLL very diminished w/fine crackles.   Abdominal:      Tenderness: There is no abdominal tenderness.   Musculoskeletal:      Right lower leg: Edema present.      Left lower leg: Edema present.   Skin:     General: Skin is warm and dry.      Capillary Refill: Capillary refill takes less than 2 seconds.   Neurological:      General: No focal deficit present.      Mental Status: She is alert and oriented to person, place, and time.   Psychiatric:         Mood and Affect: Mood normal.         Vitals:  Blood pressure 118/87, pulse 101, temperature 98.5 °F (36.9 °C), temperature source Oral, resp. rate 18, weight 73.5 kg (162 lb 0.6 oz), last menstrual period 1990, SpO2 99%, not currently breastfeeding.,     Body mass index is 29.64 kg/m².,   Systolic (24hrs), Av , Min:113 , Max:131     Diastolic (24hrs), Av, Min:61, Max:87      Intake/Output Summary (Last 24 hours) at 2024 0951  Last data filed at 2024 0900  Gross per 24 hour   Intake 990 ml   Output 1400 ml   Net -410 ml     Weight (last 2 days)       Date/Time Weight    24 0600 73.5 (162.04)    24 0600 73.8 (162.7)    24 0514 73.3 (161.6)            LABORATORY RESULTS      CBC with diff:   Results from last 7 days   Lab Units 24  0624  0546 24  0524  1405   WBC Thousand/uL 5.72 6.23 5.70 5.89   HEMOGLOBIN g/dL 10.5* 11.1* 10.7* 11.5   HEMATOCRIT % 33.0* 34.7* 33.3* 36.3   MCV fL 91 90 91 91   PLATELETS Thousands/uL 225 255 238 267   RBC Million/uL 3.64* 3.84 3.66* 3.98   MCH pg 28.8 28.9 29.2 28.9   MCHC g/dL 31.8 32.0 32.1 31.7   RDW % 14.4 14.5 14.5 14.6   MPV fL 10.3 10.5 10.1 10.2   NRBC AUTO /100 WBCs 0 0 0 0       CMP:  Results from last 7 days   Lab Units 24  0624  0546 24  0524  1405   POTASSIUM mmol/L 3.3* 3.6 3.5 4.3   CHLORIDE mmol/L 98 98 98 98   CO2 mmol/L 30 29 30 29   BUN mg/dL 36* 35* 31* 31*   CREATININE mg/dL 1.38* 1.39* 1.32* 1.38*   CALCIUM mg/dL 8.8 9.0 8.8 9.0   AST U/L  --   --   --  36   ALT U/L  --   --   --  39   ALK PHOS U/L  --   --   --  70   EGFR ml/min/1.73sq m 36 35 38 36       BMP:  Results from last 7 days   Lab Units 24  0630 24  0546 24  0528 24  1405   POTASSIUM mmol/L 3.3* 3.6 3.5 4.3   CHLORIDE mmol/L 98 98 98 98   CO2 mmol/L 30 29 30 29   BUN mg/dL 36* 35* 31* 31*   CREATININE mg/dL 1.38* 1.39* 1.32* 1.38*   CALCIUM mg/dL 8.8 9.0 8.8 9.0       Lab Results   Component Value Date    NTBNP 761 (H)  2021    NTBNP 2,773 (H) 2021    NTBNP 506 (H) 2019                                Lipid Profile:   Lab Results   Component Value Date    CHOL 205 2015    CHOL 195 07/10/2014     Lab Results   Component Value Date    HDL 28 (L) 2024    HDL 46 (L) 2024    HDL 53 2023     Lab Results   Component Value Date    LDLCALC 40 2024    LDLCALC 64 2024    LDLCALC 96 2023     Lab Results   Component Value Date    TRIG 55 2024    TRIG 63 2024    TRIG 68 2023       Cardiac testing:   Results for orders placed during the hospital encounter of 21    Echo complete with contrast if indicated    [address] [place] [id number] [phone]    Transthoracic Echocardiogram  2D, M-mode, Doppler, and Color Doppler    Study date:  2021    Patient: TANISHA LEVINE  MR number: [id number]  Account number: [id number]  : 1945  Age: 75 years  Gender: Female  Status: Inpatient  Location: Bedside  Height: 62 in  Weight: 151.6 lb  BP: 126/ 94 mmHg    Indications: Atrial fibrillation    Diagnoses: I48.0 - Atrial fibrillation    Sonographer:  IGNACIO Cameron  Primary Physician:  Naveen Monsivais MD  Referring Physician:  Shantal Huff MD  Group:  [facility]  Interpreting Physician:  Kwabena Seymour MD    SUMMARY    LEFT VENTRICLE:  Systolic function was normal. Ejection fraction was estimated to be 55 %.  There were no regional wall motion abnormalities.  Wall thickness was at the upper limits of normal.  Doppler parameters were consistent with elevated ventricular end-diastolic filling pressure.    LEFT ATRIUM:  The atrium was mildly to moderately dilated.    RIGHT ATRIUM:  The atrium was mildly dilated.    MITRAL VALVE:  There was mild stenosis.    TRICUSPID VALVE:  There was mild to moderate regurgitation.  Pulmonary artery systolic pressure was mildly increased.    HISTORY:  PRIOR HISTORY: HLD, HTN, PAF, chronic CHF, thyroid nodule    PROCEDURE: The procedure was performed at the bedside. This was a routine study. The transthoracic approach was used. The study included complete 2D imaging, M-mode, complete spectral Doppler, and color Doppler. The heart rate was 101 bpm,  at the start of the study. Images were obtained from the parasternal, apical, subcostal, and suprasternal notch acoustic windows. Echocardiographic views were limited due to lung interference. This was a technically difficult study.    LEFT VENTRICLE: Size was normal. Systolic function was normal. Ejection fraction was estimated to be 55 %. There were no regional wall motion abnormalities. Wall thickness was at the upper limits of normal. DOPPLER: Transmitral flow  pattern: atrial fibrillation. Left ventricular diastolic function parameters were abnormal. Doppler parameters were consistent with elevated ventricular end-diastolic filling pressure.    RIGHT VENTRICLE: The size was normal. Systolic function was normal. Wall thickness was normal.    LEFT ATRIUM: The atrium was mildly to moderately dilated.    RIGHT ATRIUM: The atrium was mildly dilated.    MITRAL VALVE: Valve structure was normal. There was normal leaflet separation. DOPPLER: The transmitral velocity was within the normal range. There was mild stenosis. There was no significant regurgitation.    AORTIC VALVE: The valve was trileaflet. Leaflets exhibited normal thickness and normal cuspal separation. DOPPLER: Transaortic velocity was within the normal range. There was no evidence for stenosis. There was no significant  regurgitation.    TRICUSPID VALVE: The valve structure was normal. There was normal leaflet separation. DOPPLER: The transtricuspid velocity was within the normal range. There was no evidence for stenosis. There was mild to moderate regurgitation. Pulmonary  artery systolic pressure was mildly increased.    PULMONIC VALVE: Leaflets exhibited  normal thickness, no calcification, and normal cuspal separation. DOPPLER: The transpulmonic velocity was within the normal range. There was no significant regurgitation.    PERICARDIUM: There was no pericardial effusion. The pericardium was normal in appearance.    AORTA: The root exhibited normal size.    SYSTEMIC VEINS: IVC: The inferior vena cava was normal in size.    PULMONARY VEINS: DOPPLER: Doppler signals were not recordable in the pulmonary vein(s).    SYSTEM MEASUREMENT TABLES    2D  %FS: 37.15 %  Ao Diam: 2.74 cm  Ao asc: 2.72 cm  EDV(Teich): 103.17 ml  EF(Teich): 67.06 %  ESV(Teich): 33.98 ml  IVSd: 0.94 cm  LA Area: 13.2 cm2  LA Diam: 3.98 cm  LVEDV MOD A4C: 32.01 ml  LVEF MOD A4C: 63.74 %  LVESV MOD A4C: 11.61 ml  LVIDd: 4.72 cm  LVIDs: 2.96 cm  LVLd A4C: 5.9 cm  LVLs A4C: 4.79 cm  LVPWd: 0.93 cm  RA Area: 8.67 cm2  RVIDd: 3.35 cm  SV MOD A4C: 20.4 ml  SV(Teich): 69.19 ml    CW  TR MaxP.39 mmHg  TR Vmax: 2.89 m/s    MM  TAPSE: 1.51 cm    [employer] Commission Accredited Echocardiography Laboratory    Prepared and electronically signed by    Kwabena Seymour MD  Signed 2021 11:05:34    No results found for this or any previous visit.    No results found for this or any previous visit.    No valid procedures specified.  Results for orders placed during the hospital encounter of 10/05/22    NM myocardial perfusion spect (stress and/or rest)    Interpretation Summary    Resting ECG: ECG is abnormal. Resting ECG shows no ST-segment deviation. Patient a paced and V sensed.    Perfusion Defect Conclusion: The stress/rest perfusion ratio is 1.04 . There is no evidence of transient ischemic dilation (TID).    Perfusion: There is a left ventricular perfusion defect that is small in size with mild reduction in uptake present in the mid to apical anterior and anteroseptal location(s) that is predominantly fixed. The defect appears to be an artifact caused by breast attenuation.    Stress Function:  Left ventricular function post-stress is normal. Post-stress ejection fraction is 70 %.    Negative exercise pharmacological stress test      Meds/Allergies   all current active meds have been reviewed and current meds:   Current Facility-Administered Medications   Medication Dose Route Frequency    acetaminophen (TYLENOL) tablet 650 mg  650 mg Oral Q8H PRN    albuterol (PROVENTIL HFA,VENTOLIN HFA) inhaler 2 puff  2 puff Inhalation Q6H PRN    apixaban (ELIQUIS) tablet 5 mg  5 mg Oral BID    ascorbic acid (VITAMIN C) tablet 500 mg  500 mg Oral Daily    benzonatate (TESSALON PERLES) capsule 100 mg  100 mg Oral TID PRN    bumetanide (BUMEX) injection 1 mg  1 mg Intravenous BID    Cholecalciferol (VITAMIN D3) tablet 1,000 Units  1,000 Units Oral Daily    dextromethorphan-guaiFENesin (ROBITUSSIN DM) oral syrup 10 mL  10 mL Oral Q4H PRN    diltiazem (CARDIZEM CD) 24 hr capsule 120 mg  120 mg Oral Daily    ezetimibe (ZETIA) tablet 10 mg  10 mg Oral HS    famotidine (PEPCID) tablet 20 mg  20 mg Oral BID    flecainide (TAMBOCOR) tablet 150 mg  150 mg Oral BID    gabapentin (NEURONTIN) capsule 300 mg  300 mg Oral HS    insulin glargine (LANTUS) subcutaneous injection 10 Units 0.1 mL  10 Units Subcutaneous HS    insulin lispro (HumALOG/ADMELOG) 100 units/mL subcutaneous injection 1-5 Units  1-5 Units Subcutaneous TID AC    levalbuterol (XOPENEX) inhalation solution 1.25 mg  1.25 mg Nebulization TID    loratadine (CLARITIN) tablet 10 mg  10 mg Oral Daily    metoprolol succinate (TOPROL-XL) 24 hr tablet 75 mg  75 mg Oral Q12H    potassium chloride (Klor-Con M20) CR tablet 40 mEq  40 mEq Oral Once    rOPINIRole (REQUIP) tablet 2 mg  2 mg Oral HS    zolpidem (AMBIEN) tablet 10 mg  10 mg Oral HS          Counseling / Coordination of Care  Total floor / unit time spent today 20 minutes.  Greater than 50% of total time was spent with the patient and / or family counseling and / or coordination of care.      ** Please Note: Dragon 360  Dictation voice to text software may have been used in the creation of this document. **

## 2024-08-09 NOTE — PLAN OF CARE

## 2024-08-09 NOTE — CASE MANAGEMENT
Case Management Progress Note    Patient name Farnaz Martin  Location S /S -01 MRN [id number]  : 1945 Date 2024       LOS (days): 3  Geometric Mean LOS (GMLOS) (days): 3.9  Days to GMLOS:0.9        OBJECTIVE:        Current admission status: Inpatient  Preferred Pharmacy:   [id number] - [place] - [address]  [address]  [place] [address]  Phone: [phone] Fax: [phone]    [facility] - [place] [place] [facility] [email or web address] [facility] ([place], [place])  [address]  [address]  [facility] [address]  Phone: [phone] Fax: [phone]    Primary Care Provider: Naveen Monsivais MD    Primary Insurance: MEDICARE  Secondary Insurance: AARP    PROGRESS NOTE:    Per SLIM - patient possible weekend d/c. CM f/u with [facility] via aidin re: same. Per message received from S admissions - will need to f/u with facility directly if pt ready for d/c. CM to call S at [phone] and ask for the Supervisor on duty to determine ability to accept pt over the weekend.

## 2024-08-09 NOTE — ASSESSMENT & PLAN NOTE
Recent Labs     08/06/24  1405 08/07/24  0528 08/08/24  0546   CREATININE 1.38* 1.32* 1.39*   EGFR 36 38 35     Estimated Creatinine Clearance: 30.4 mL/min (A) (by C-G formula based on SCr of 1.39 mg/dL (H)).    POA 1.38; (baseline 1.1-1.28)  Likely secondary to fluid overload due to being under diuresed at home with 10 mg torsemide daily.    Plan:  IV Bumex 1mg twice daily  Monitor BMP, IV fluids held  Avoid hypoperfusion of the kidneys, minimize nephrotoxins

## 2024-08-09 NOTE — PROGRESS NOTES
LILLY AMARO received ADT alert that patient was readmitted to the hospital on 8/6.  Per chart review, she remains IP at this time with a plan to return to [facility] for STR when stable.  LILLY AMARO will monitor for d/c

## 2024-08-09 NOTE — PLAN OF CARE
Problem: PAIN - ADULT  Goal: Verbalizes/displays adequate comfort level or baseline comfort level  Description: Interventions:  - Encourage patient to monitor pain and request assistance  - Assess pain using appropriate pain scale  - Administer analgesics based on type and severity of pain and evaluate response  - Implement non-pharmacological measures as appropriate and evaluate response  - Consider cultural and social influences on pain and pain management  - Notify physician/advanced practitioner if interventions unsuccessful or patient reports new pain  Outcome: Progressing     Problem: INFECTION - ADULT  Goal: Absence or prevention of progression during hospitalization  Description: INTERVENTIONS:  - Assess and monitor for signs and symptoms of infection  - Monitor lab/diagnostic results  - Monitor all insertion sites, i.e. indwelling lines, tubes, and drains  - Monitor endotracheal if appropriate and nasal secretions for changes in amount and color  - [place] appropriate cooling/warming therapies per order  - Administer medications as ordered  - Instruct and encourage patient and family to use good hand hygiene technique  - Identify and instruct in appropriate isolation precautions for identified infection/condition  Outcome: Progressing  Goal: Absence of fever/infection during neutropenic period  Description: INTERVENTIONS:  - Monitor WBC    Outcome: Progressing     Problem: SAFETY ADULT  Goal: Patient will remain free of falls  Description: INTERVENTIONS:  - Educate patient/family on patient safety including physical limitations  - Instruct patient to call for assistance with activity   - Consult OT/PT to assist with strengthening/mobility   - Keep Call bell within reach  - Keep bed low and locked with side rails adjusted as appropriate  - Keep care items and personal belongings within reach  - Initiate and maintain comfort rounds  - Make Fall Risk Sign visible to staff  - Offer Toileting every 2 Hours,  in advance of need  - Apply yellow socks and bracelet for high fall risk patients  - Consider moving patient to room near nurses station  Outcome: Progressing  Goal: Maintain or return to baseline ADL function  Description: INTERVENTIONS:  -  Assess patient's ability to carry out ADLs; assess patient's baseline for ADL function and identify physical deficits which impact ability to perform ADLs (bathing, care of mouth/teeth, toileting, grooming, dressing, etc.)  - Assess/evaluate cause of self-care deficits   - Assess range of motion  - Assess patient's mobility; develop plan if impaired  - Assess patient's need for assistive devices and provide as appropriate  - Encourage maximum independence but intervene and supervise when necessary  - Involve family in performance of ADLs  - Assess for home care needs following discharge   - Consider OT consult to assist with ADL evaluation and planning for discharge  - Provide patient education as appropriate  Outcome: Progressing  Goal: Maintains/Returns to pre admission functional level  Description: INTERVENTIONS:  - Perform AM-PAC 6 Click Basic Mobility/ Daily Activity assessment daily.  - Set and communicate daily mobility goal to care team and patient/family/caregiver.   - Collaborate with rehabilitation services on mobility goals if consulted  - Perform Range of Motion 3 times a day.  - Reposition patient every 2 hours.  - Dangle patient 3 times a day  - Stand patient 3 times a day  - Ambulate patient 3 times a day  - Out of bed to chair 3 times a day   - Out of bed for meals 3 times a day  - Out of bed for toileting  - Record patient progress and toleration of activity level   Outcome: Progressing     Problem: DISCHARGE PLANNING  Goal: Discharge to home or other facility with appropriate resources  Description: INTERVENTIONS:  - Identify barriers to discharge w/patient and caregiver  - Arrange for needed discharge resources and transportation as appropriate  - Identify  discharge learning needs (meds, wound care, etc.)  - Arrange for interpretive services to assist at discharge as needed  - Refer to Case Management Department for coordinating discharge planning if the patient needs post-hospital services based on physician/advanced practitioner order or complex needs related to functional status, cognitive ability, or social support system  Outcome: Progressing     Problem: Knowledge Deficit  Goal: Patient/family/caregiver demonstrates understanding of disease process, treatment plan, medications, and discharge instructions  Description: Complete learning assessment and assess knowledge base.  Interventions:  - Provide teaching at level of understanding  - Provide teaching via preferred learning methods  Outcome: Progressing

## 2024-08-09 NOTE — PROGRESS NOTES
[facility]  Progress Note  Name: Farnaz Martin I  MRN: [id number]  Unit/Bed#: S -01 I Date of Admission: 8/6/2024   Date of Service: 8/9/2024 I Hospital Day: 3    Assessment & Plan   * Acute on chronic heart failure (HCC)  Assessment & Plan    Lab Results   Component Value Date    LVEF 50 07/14/2024    BNP 1,091 (H) 08/06/2024    BNP 1,428 (H) 07/24/2024     Acute and chronic respiratory failure with hypoxia 2/2 CHF exacerbation a/e/b hypoxia, increased oxygen demands requiring CXR, Nebs, IV Lasix and 4L NC oxygen.    Presents with increased shortness of breath, dyspnea on exertion, lower extremity edema, and cough with productive sputum  Patient is currently volume overloaded.  3 to 4-day history of progressively worsening dyspnea, dry cough, worsening lower extremity edema.  She required increased oxygen when EMS arrived. difficulty talking normal sentences secondary to shortness of breath.  She is now unable to lie flat.  She was discharged on recent admission with 10 mg torsemide daily  Findings: ED provider: She is at baseline on 2 L O2 supplemental O2 and, because of hypoxia to the 80s, was bumped up to 4.  On arrival 97% on 4L (baseline chronic 2L NC)  Signs and symptoms indicative of acute CHF exacerbation secondary to under diuresis.  Patient was initially on 2 L of oxygen, presents with increased work of breathing, dry cough, + hepatojular reflux, appeared volume overloaded.  Currently pt is on room air saturating appopriately for her, diuresed approximately 1.55L of fluid since admission, improving SOB       Plan:  Per cardiology: increase IV Bumex to 2 mg BID.   Start kdur 20 meq BID. , B-Blocker: Toprol  Cardiac diet, sodium restriction  CMP, magnesium a.m; Goal Mg > 2 and K > 4; Replete prn  Daily standing weights, Measure I/O    Most recent Echo 50% LVEF     Wt Readings from Last 3 Encounters:   08/08/24 73.8 kg (162 lb 11.2 oz)   07/29/24 72 kg (158 lb 12.8 oz)    07/28/24 71.9 kg (158 lb 8.2 oz)               Elevated serum creatinine  Assessment & Plan  Recent Labs     08/06/24  1405 08/07/24  0528 08/08/24  0546   CREATININE 1.38* 1.32* 1.39*   EGFR 36 38 35     Estimated Creatinine Clearance: 30.4 mL/min (A) (by C-G formula based on SCr of 1.39 mg/dL (H)).    POA 1.38; (baseline 1.1-1.28)  Likely secondary to fluid overload due to being under diuresed at home with 10 mg torsemide daily.    Plan:  IV Bumex 1mg twice daily  Monitor BMP, IV fluids held  Avoid hypoperfusion of the kidneys, minimize nephrotoxins    Cough  Assessment & Plan  Pt endorsing orthopnea, SOB  Productive in nature without production of sputum  Uses Spiriva inhaler and Robitussin at home for sx's  R/p chest x-ray shows increased congestion, persistent pleural effusion  Cough component likely CHF exacerbation  Pulmonology was consulted, recommended no procedural interventions at this time, but recommended   -Continue XOPENEX inhalation solution 1.25 mg   -Continue home inhalers and nebulizer treatment on discharge.   -Possible home oxygen evaluation     Plan:  Atrovent neb four times daily  XOPENEX inhalation solution 1.25 mg   Continue home inhalers and nebulizer treatment on discharge.   Possible home oxygen evaluation       Paroxysmal A-fib (HCC)  Assessment & Plan  Afib-  S/p ablation in 03/2021.   EKG- AV dual-paced rhythm, Vent. rate has decreased by 14 BPM since prior EKG  Low voltage QRS    Plan:  Eliquis 5 mg twice daily  Continue flecainide 150 mg twice daily and metoprolol succinate 75 mg twice daily  Continue diltiazem 120 mg daily           VTE Pharmacologic Prophylaxis: VTE Score: 5 High Risk (Score >/= 5) - Pharmacological DVT Prophylaxis Ordered: apixaban (Eliquis). Sequential Compression Devices Ordered.    Mobility:   Basic Mobility Inpatient Raw Score: 23  JH-HLM Goal: 7: Walk 25 feet or more  JH-HLM Achieved: 7: Walk 25 feet or more  JH-HLM Goal achieved. Continue to encourage  appropriate mobility.    Patient Centered Rounds: I performed bedside rounds with nursing staff today.   Discussions with Specialists or Other Care Team Provider: Cardiology, pulmonology    Education and Discussions with Family / Patient: Updated  (daughter) via phone.    Total Time Spent on Date of Encounter in care of patient: 60 mins. This time was spent on one or more of the following: performing physical exam; counseling and coordination of care; obtaining or reviewing history; documenting in the medical record; reviewing/ordering tests, medications or procedures; communicating with other healthcare professionals and discussing with patient's family/caregivers.    Current Length of Stay: 3 day(s)  Current Patient Status: Inpatient   Certification Statement: The patient will continue to require additional inpatient hospital stay due to acute on chronic CHF exacerbation  Discharge Plan: Anticipate discharge in 24-48 hrs to rehab facility.    Code Status: Level 3 - DNAR and DNI    Subjective:   Evaluated patient at bedside, no acute events overnight.  She reports feeling better overall, with less shortness of breath and longer distance walking around her room without becoming shortness of breath.  She notes her coughing has improved in intensity with less productivity.  She denies any chest pain, shortness of breath, palpitation, dizziness, lightheadedness.  She is diuresing appropriately with frequent trips to the bathroom.  She understands a plan of care going forward, all questions and concerns were answered at bedside.    Objective:     Vitals:   Temp (24hrs), Av.2 °F (36.8 °C), Min:97.8 °F (36.6 °C), Max:98.5 °F (36.9 °C)    Temp:  [97.8 °F (36.6 °C)-98.5 °F (36.9 °C)] 98.5 °F (36.9 °C)  HR:  [] 101  Resp:  [16-18] 18  BP: (113-131)/(61-87) 118/87  SpO2:  [93 %-99 %] 99 %  Body mass index is 29.64 kg/m².     Input and Output Summary (last 24 hours):     Intake/Output Summary (Last 24  hours) at 8/9/2024 1236  Last data filed at 8/9/2024 1141  Gross per 24 hour   Intake 810 ml   Output 1600 ml   Net -790 ml       Physical Exam:   Physical Exam  Vitals and nursing note reviewed.   Constitutional:       General: She is not in acute distress.     Appearance: She is well-developed.   HENT:      Head: Normocephalic and atraumatic.   Eyes:      Conjunctiva/sclera: Conjunctivae normal.   Cardiovascular:      Rate and Rhythm: Normal rate and regular rhythm.      Heart sounds: No murmur heard.  Pulmonary:      Effort: Pulmonary effort is normal. No respiratory distress.      Breath sounds: Normal breath sounds.   Abdominal:      Palpations: Abdomen is soft.      Tenderness: There is no abdominal tenderness.   Musculoskeletal:         General: No swelling.      Cervical back: Neck supple.      Right lower leg: Edema (1+) present.      Left lower leg: Edema (1+) present.   Skin:     General: Skin is warm and dry.      Capillary Refill: Capillary refill takes less than 2 seconds.   Neurological:      Mental Status: She is alert.   Psychiatric:         Mood and Affect: Mood normal.          Additional Data:     Labs:  Results from last 7 days   Lab Units 08/09/24  0630   WBC Thousand/uL 5.72   HEMOGLOBIN g/dL 10.5*   HEMATOCRIT % 33.0*   PLATELETS Thousands/uL 225   SEGS PCT % 52   LYMPHO PCT % 28   MONO PCT % 14*   EOS PCT % 5     Results from last 7 days   Lab Units 08/09/24  0630 08/07/24  0528 08/06/24  1405   SODIUM mmol/L 136   < > 135   POTASSIUM mmol/L 3.3*   < > 4.3   CHLORIDE mmol/L 98   < > 98   CO2 mmol/L 30   < > 29   BUN mg/dL 36*   < > 31*   CREATININE mg/dL 1.38*   < > 1.38*   ANION GAP mmol/L 8   < > 8   CALCIUM mg/dL 8.8   < > 9.0   ALBUMIN g/dL  --   --  3.7   TOTAL BILIRUBIN mg/dL  --   --  0.87   ALK PHOS U/L  --   --  70   ALT U/L  --   --  39   AST U/L  --   --  36   GLUCOSE RANDOM mg/dL 81   < > 138    < > = values in this interval not displayed.         Results from last 7 days   Lab  Units 08/09/24  1155 08/09/24  0744 08/08/24  2155 08/08/24  1659 08/08/24  1126 08/08/24  0730 08/07/24  2052 08/07/24  1731   POC GLUCOSE mg/dl 166* 62* 166* 163* 122 96 184* 155*               Lines/Drains:  Invasive Devices       Peripheral Intravenous Line  Duration             Peripheral IV 08/06/24 Left Antecubital 2 days                          Imaging: Reviewed radiology reports from this admission including: chest xray    Recent Cultures (last 7 days):         Last 24 Hours Medication List:   Current Facility-Administered Medications   Medication Dose Route Frequency Provider Last Rate    acetaminophen  650 mg Oral Q8H PRN Steve Tripathi MD      albuterol  2 puff Inhalation Q6H PRN Steve Tripathi MD      apixaban  5 mg Oral BID Steve Tripathi MD      ascorbic acid  500 mg Oral Daily Steve Tripathi MD      benzonatate  100 mg Oral TID PRN Steve Tripathi MD      bumetanide  2 mg Intravenous BID JENNY Hou      Cholecalciferol  1,000 Units Oral Daily Steve Tripathi MD      dextromethorphan-guaiFENesin  10 mL Oral Q4H PRN Odin Castro MD      diltiazem  120 mg Oral Daily Steve Tripathi MD      ezetimibe  10 mg Oral HS Steve Tripathi MD      famotidine  20 mg Oral BID Steve Tripathi MD      flecainide  150 mg Oral BID Steve Tripathi MD      gabapentin  300 mg Oral HS Steve Tripathi MD      insulin glargine  10 Units Subcutaneous HS Steve Tripathi MD      insulin lispro  1-5 Units Subcutaneous TID AC Steve Tripathi MD      levalbuterol  1.25 mg Nebulization TID Fauzia Pennington MD      loratadine  10 mg Oral Daily Steve Tripathi MD      metoprolol succinate  75 mg Oral Q12H Steve Tripathi MD      potassium chloride  20 mEq Oral BID JENNY Hou      rOPINIRole  2 mg Oral HS Steve Tripathi MD      zolpidem  10 mg Oral HS Steve Tripathi MD          Today, Patient Was Seen By: Steve Tripathi MD    **Please Note: This note may have been constructed using a voice recognition  system.**

## 2024-08-10 LAB
ANION GAP SERPL CALCULATED.3IONS-SCNC: 9 MMOL/L (ref 4–13)
BASOPHILS # BLD AUTO: 0.07 THOUSANDS/ÂΜL (ref 0–0.1)
BASOPHILS NFR BLD AUTO: 1 % (ref 0–1)
BUN SERPL-MCNC: 34 MG/DL (ref 5–25)
CALCIUM SERPL-MCNC: 8.9 MG/DL (ref 8.4–10.2)
CHLORIDE SERPL-SCNC: 100 MMOL/L (ref 96–108)
CO2 SERPL-SCNC: 28 MMOL/L (ref 21–32)
CREAT SERPL-MCNC: 1.24 MG/DL (ref 0.6–1.3)
EOSINOPHIL # BLD AUTO: 0.36 THOUSAND/ÂΜL (ref 0–0.61)
EOSINOPHIL NFR BLD AUTO: 7 % (ref 0–6)
ERYTHROCYTE [DISTWIDTH] IN BLOOD BY AUTOMATED COUNT: 14.3 % (ref 11.6–15.1)
GFR SERPL CREATININE-BSD FRML MDRD: 41 ML/MIN/1.73SQ M
GLUCOSE SERPL-MCNC: 120 MG/DL (ref 65–140)
GLUCOSE SERPL-MCNC: 160 MG/DL (ref 65–140)
GLUCOSE SERPL-MCNC: 74 MG/DL (ref 65–140)
GLUCOSE SERPL-MCNC: 76 MG/DL (ref 65–140)
HCT VFR BLD AUTO: 33.5 % (ref 34.8–46.1)
HGB BLD-MCNC: 10.6 G/DL (ref 11.5–15.4)
IMM GRANULOCYTES # BLD AUTO: 0.02 THOUSAND/UL (ref 0–0.2)
IMM GRANULOCYTES NFR BLD AUTO: 0 % (ref 0–2)
LYMPHOCYTES # BLD AUTO: 1.62 THOUSANDS/ÂΜL (ref 0.6–4.47)
LYMPHOCYTES NFR BLD AUTO: 29 % (ref 14–44)
MAGNESIUM SERPL-MCNC: 1.9 MG/DL (ref 1.9–2.7)
MCH RBC QN AUTO: 28.6 PG (ref 26.8–34.3)
MCHC RBC AUTO-ENTMCNC: 31.6 G/DL (ref 31.4–37.4)
MCV RBC AUTO: 91 FL (ref 82–98)
MONOCYTES # BLD AUTO: 0.82 THOUSAND/ÂΜL (ref 0.17–1.22)
MONOCYTES NFR BLD AUTO: 15 % (ref 4–12)
MRSA NOSE QL CULT: NORMAL
NEUTROPHILS # BLD AUTO: 2.66 THOUSANDS/ÂΜL (ref 1.85–7.62)
NEUTS SEG NFR BLD AUTO: 48 % (ref 43–75)
NRBC BLD AUTO-RTO: 0 /100 WBCS
PLATELET # BLD AUTO: 208 THOUSANDS/UL (ref 149–390)
PMV BLD AUTO: 10 FL (ref 8.9–12.7)
POTASSIUM SERPL-SCNC: 3.6 MMOL/L (ref 3.5–5.3)
RBC # BLD AUTO: 3.7 MILLION/UL (ref 3.81–5.12)
SODIUM SERPL-SCNC: 137 MMOL/L (ref 135–147)
WBC # BLD AUTO: 5.55 THOUSAND/UL (ref 4.31–10.16)

## 2024-08-10 PROCEDURE — 80048 BASIC METABOLIC PNL TOTAL CA: CPT | Performed by: STUDENT IN AN ORGANIZED HEALTH CARE EDUCATION/TRAINING PROGRAM

## 2024-08-10 PROCEDURE — 94760 N-INVAS EAR/PLS OXIMETRY 1: CPT

## 2024-08-10 PROCEDURE — 83735 ASSAY OF MAGNESIUM: CPT | Performed by: STUDENT IN AN ORGANIZED HEALTH CARE EDUCATION/TRAINING PROGRAM

## 2024-08-10 PROCEDURE — 85025 COMPLETE CBC W/AUTO DIFF WBC: CPT | Performed by: STUDENT IN AN ORGANIZED HEALTH CARE EDUCATION/TRAINING PROGRAM

## 2024-08-10 PROCEDURE — 94640 AIRWAY INHALATION TREATMENT: CPT

## 2024-08-10 PROCEDURE — 99232 SBSQ HOSP IP/OBS MODERATE 35: CPT | Performed by: INTERNAL MEDICINE

## 2024-08-10 PROCEDURE — 82948 REAGENT STRIP/BLOOD GLUCOSE: CPT

## 2024-08-10 RX ORDER — BUMETANIDE 1 MG/1
2 TABLET ORAL DAILY
Status: DISCONTINUED | OUTPATIENT
Start: 2024-08-11 | End: 2024-08-13

## 2024-08-10 RX ORDER — BUMETANIDE 0.25 MG/ML
2 INJECTION INTRAMUSCULAR; INTRAVENOUS 2 TIMES DAILY
Status: COMPLETED | OUTPATIENT
Start: 2024-08-10 | End: 2024-08-10

## 2024-08-10 RX ADMIN — LEVALBUTEROL HYDROCHLORIDE 1.25 MG: 1.25 SOLUTION RESPIRATORY (INHALATION) at 14:14

## 2024-08-10 RX ADMIN — DILTIAZEM HYDROCHLORIDE 120 MG: 120 CAPSULE, COATED, EXTENDED RELEASE ORAL at 09:12

## 2024-08-10 RX ADMIN — POTASSIUM CHLORIDE 20 MEQ: 1500 TABLET, EXTENDED RELEASE ORAL at 17:32

## 2024-08-10 RX ADMIN — OXYCODONE HYDROCHLORIDE AND ACETAMINOPHEN 500 MG: 500 TABLET ORAL at 09:12

## 2024-08-10 RX ADMIN — LEVALBUTEROL HYDROCHLORIDE 1.25 MG: 1.25 SOLUTION RESPIRATORY (INHALATION) at 07:33

## 2024-08-10 RX ADMIN — LORATADINE 10 MG: 10 TABLET ORAL at 09:13

## 2024-08-10 RX ADMIN — GUAIFENESIN AND DEXTROMETHORPHAN 10 ML: 100; 10 SYRUP ORAL at 17:32

## 2024-08-10 RX ADMIN — ACETAMINOPHEN 650 MG: 325 TABLET, FILM COATED ORAL at 12:42

## 2024-08-10 RX ADMIN — ROPINIROLE HYDROCHLORIDE 2 MG: 1 TABLET, FILM COATED ORAL at 21:12

## 2024-08-10 RX ADMIN — GUAIFENESIN AND DEXTROMETHORPHAN 10 ML: 100; 10 SYRUP ORAL at 10:47

## 2024-08-10 RX ADMIN — APIXABAN 5 MG: 5 TABLET, FILM COATED ORAL at 09:12

## 2024-08-10 RX ADMIN — APIXABAN 5 MG: 5 TABLET, FILM COATED ORAL at 17:32

## 2024-08-10 RX ADMIN — GABAPENTIN 300 MG: 300 CAPSULE ORAL at 21:12

## 2024-08-10 RX ADMIN — METOPROLOL SUCCINATE 75 MG: 50 TABLET, EXTENDED RELEASE ORAL at 17:32

## 2024-08-10 RX ADMIN — ZOLPIDEM TARTRATE 10 MG: 5 TABLET, COATED ORAL at 21:12

## 2024-08-10 RX ADMIN — BUMETANIDE 2 MG: 0.25 INJECTION INTRAMUSCULAR; INTRAVENOUS at 09:24

## 2024-08-10 RX ADMIN — EZETIMIBE 10 MG: 10 TABLET ORAL at 21:12

## 2024-08-10 RX ADMIN — METOPROLOL SUCCINATE 75 MG: 50 TABLET, EXTENDED RELEASE ORAL at 06:09

## 2024-08-10 RX ADMIN — INSULIN GLARGINE 10 UNITS: 100 INJECTION, SOLUTION SUBCUTANEOUS at 21:14

## 2024-08-10 RX ADMIN — Medication 1000 UNITS: at 09:13

## 2024-08-10 RX ADMIN — BENZONATATE 100 MG: 100 CAPSULE ORAL at 12:38

## 2024-08-10 RX ADMIN — BUMETANIDE 2 MG: 0.25 INJECTION INTRAMUSCULAR; INTRAVENOUS at 17:32

## 2024-08-10 RX ADMIN — FAMOTIDINE 20 MG: 20 TABLET, FILM COATED ORAL at 09:12

## 2024-08-10 RX ADMIN — FAMOTIDINE 20 MG: 20 TABLET, FILM COATED ORAL at 17:32

## 2024-08-10 RX ADMIN — FLECAINIDE ACETATE 150 MG: 50 TABLET ORAL at 09:12

## 2024-08-10 RX ADMIN — FLECAINIDE ACETATE 150 MG: 50 TABLET ORAL at 17:32

## 2024-08-10 RX ADMIN — POTASSIUM CHLORIDE 20 MEQ: 1500 TABLET, EXTENDED RELEASE ORAL at 09:13

## 2024-08-10 RX ADMIN — LEVALBUTEROL HYDROCHLORIDE 1.25 MG: 1.25 SOLUTION RESPIRATORY (INHALATION) at 19:44

## 2024-08-10 NOTE — ASSESSMENT & PLAN NOTE
Lab Results   Component Value Date    LVEF 50 07/14/2024    BNP 1,091 (H) 08/06/2024    BNP 1,428 (H) 07/24/2024     Acute and chronic respiratory failure with hypoxia 2/2 CHF exacerbation a/e/b hypoxia, increased oxygen demands requiring CXR, Nebs, IV Lasix and 4L NC oxygen.    Presents with increased shortness of breath, dyspnea on exertion, lower extremity edema, and cough with productive sputum  Patient is currently volume overloaded.  3 to 4-day history of progressively worsening dyspnea, dry cough, worsening lower extremity edema.  She required increased oxygen when EMS arrived. difficulty talking normal sentences secondary to shortness of breath.  She is now unable to lie flat.  She was discharged on recent admission with 10 mg torsemide daily  Findings: ED provider: She is at baseline on 2 L O2 supplemental O2 and, because of hypoxia to the 80s, was bumped up to 4.  On arrival 97% on 4L (baseline chronic 2L NC)  Signs and symptoms indicative of acute CHF exacerbation secondary to under diuresis.  Patient was initially on 2 L of oxygen, presents with increased work of breathing, dry cough, + hepatojular reflux, appeared volume overloaded.  Currently pt is on room air saturating appopriately for her, diuresed approximately 1.55L of fluid since admission, improving SOB     CXR 8/9/24: shows stable pleural effusion   Most recent Echo 50% LVEF     Wt Readings from Last 3 Encounters:   08/10/24 73.2 kg (161 lb 6 oz)   07/29/24 72 kg (158 lb 12.8 oz)   07/28/24 71.9 kg (158 lb 8.2 oz)     I/O: - 2.2 L since admission    Plan:  Per cardiology: increase IV Bumex to 2 mg BID.   Start kdur 20 meq BID. , B-Blocker: Toprol  Received an additional 1 mg dose of IV Bumex on 8/9/24  Possible transition to P.O. Bumex tomorrow.   Cardiac diet, sodium restriction  CMP, magnesium a.m; Goal Mg > 2 and K > 4; Replete prn  Daily standing weights, Measure I/O  Discuss pleural effusions with pulmonology

## 2024-08-10 NOTE — PROGRESS NOTES
General Cardiology   Progress Note -  Team One   Farnaz Martin 79 y.o. female MRN: [id number]    Unit/Bed#: S -01 Encounter: [id number]    Assessment  1. Acute on chronic HFpEF  -BNP level 1091  -Outpatient diuretic:torsemide 10 mg daily.  -Inpatient diuretic regimen; IV Bumex 2 mg BID  -24-hour I&O balance; -1.2L, overall -2.7L  -Weight: 167 lbs per standing scale on 8/6, 161 lbs today (down from 162 lbs yesterday)  -Dry weight: recently 158-160 lbs  2. Recurrent right sided pleural effusion  -S/p prior thoracentesis procedures.   -CXR in the ED demonstrated mild pulmonary edema, stable R>L pleural effusions  -Pulmonary following, no recommendation at this time for thoracentesis. Patient remains on RA w/on sats in the low 90s.   3. Paroxysmal atrial fibrillation/flutter and PAT  4. SSS, PPM in situ.  -Prior atrial fibrillation/flutter ablation in the past.  -ECG on admission demonstrated AV paced rhythm.  -Device interrogation 6/17/2024; AT/AF BURDEN SINCE 5/24/24-2.4%.   -On Cardizem  mg daily, flecainide 150 mg twice daily, and metoprolol succinate 75 mg BID  -On Eliquis 5 mg BID  5. HTN  -BP last recorded at 117/56  -On Cardizem  mg daily, metoprolol succinate 75 mg BID  6. HLD  -Lipid profile 7/25/2024; cholesterol 79, triglyceride 55, HDL 28 and LDL calculated 40.  -On Zetia 10 mg daily.  7. Type 2 DM- A1c 8.8%  8. CKD stage III  -Baseline creatinine 0.8-1.1, recent discharge (7/29) creat level was 1.29  -Creat 1.38 on admission, currently 1.24     Plan  -Pt feels well this a.m. without any specific cardiac or respiratory complaints.  Mild nonproductive cough.  No supplemental O2 requirements with stable O2 saturations.  -Improved volume status/net negative fluid balance over the past 24 hours with up titration of IV diuretic therapy.  Renal function also improved.  Still mildly volume overloaded on exam.  RLL with diminished sounds/fine crackles, and JVD still present.  Would favor  continuation of IV diuretics for today.  IV Bumex 2 mg twice daily.  Will transition to oral Bumex 2 mg daily tomorrow.  Continue K-Dur 20 meq twice daily today.  Will repeat chest x-ray tomorrow morning.  -Continue Cardizem  mg daily, flecainide 150 mg twice daily, and metoprolol succinate 75 mg BID  -Continue Zetia 10 mg daily.   -Continue Eliquis 5 mg BID  -No indication for telemetry.      Subjective  Review of Systems   Constitutional: Negative for chills, fever and malaise/fatigue.   Eyes:  Negative for visual disturbance.   Cardiovascular:  Negative for chest pain, dyspnea on exertion, leg swelling, orthopnea and palpitations.   Respiratory:  Negative for cough and shortness of breath.    Gastrointestinal:  Negative for abdominal pain.   Neurological:  Negative for dizziness, headaches and light-headedness.       Objective:   Physical Exam  Vitals and nursing note reviewed.   Constitutional:       General: She is not in acute distress.     Appearance: She is not diaphoretic.   HENT:      Head: Normocephalic and atraumatic.   Eyes:      General: No scleral icterus.  Neck:      Comments: jvd  Cardiovascular:      Rate and Rhythm: Normal rate and regular rhythm.      Pulses: Normal pulses.      Heart sounds: Normal heart sounds.   Pulmonary:      Effort: Pulmonary effort is normal.      Breath sounds: Normal breath sounds. No wheezing.   Abdominal:      Palpations: Abdomen is soft.   Musculoskeletal:      Right lower leg: No edema.      Left lower leg: No edema.   Skin:     General: Skin is warm and dry.      Capillary Refill: Capillary refill takes less than 2 seconds.   Neurological:      General: No focal deficit present.      Mental Status: She is alert and oriented to person, place, and time.   Psychiatric:         Mood and Affect: Mood normal.       Vitals: Blood pressure 97/74, pulse 63, temperature 98.2 °F (36.8 °C), temperature source Oral, resp. rate 17, weight 73.2 kg (161 lb 6 oz), last  menstrual period 1990, SpO2 95%, not currently breastfeeding.,     Body mass index is 29.52 kg/m².,   Systolic (24hrs), Av , Min:97 , Max:135     Diastolic (24hrs), Av, Min:60, Max:83      Intake/Output Summary (Last 24 hours) at 8/10/2024 0851  Last data filed at 8/10/2024 0652  Gross per 24 hour   Intake 780 ml   Output 1700 ml   Net -920 ml     Weight (last 2 days)       Date/Time Weight    08/10/24 0500 73.2 (161.38)    24 0600 73.5 (162.04)    24 0600 73.8 (162.7)            LABORATORY RESULTS      CBC with diff:   Results from last 7 days   Lab Units 08/10/24  0333 08/09/24  0624  0546 24  0524  1405   WBC Thousand/uL 5.55 5.72 6.23 5.70 5.89   HEMOGLOBIN g/dL 10.6* 10.5* 11.1* 10.7* 11.5   HEMATOCRIT % 33.5* 33.0* 34.7* 33.3* 36.3   MCV fL 91 91 90 91 91   PLATELETS Thousands/uL 208 225 255 238 267   RBC Million/uL 3.70* 3.64* 3.84 3.66* 3.98   MCH pg 28.6 28.8 28.9 29.2 28.9   MCHC g/dL 31.6 31.8 32.0 32.1 31.7   RDW % 14.3 14.4 14.5 14.5 14.6   MPV fL 10.0 10.3 10.5 10.1 10.2   NRBC AUTO /100 WBCs 0 0 0 0 0       CMP:  Results from last 7 days   Lab Units 08/10/24  0333 08/09/24  0624  0546 24  0528 24  1405   POTASSIUM mmol/L 3.6 3.3* 3.6 3.5 4.3   CHLORIDE mmol/L 100 98 98 98 98   CO2 mmol/L 28 30 29 30 29   BUN mg/dL 34* 36* 35* 31* 31*   CREATININE mg/dL 1.24 1.38* 1.39* 1.32* 1.38*   CALCIUM mg/dL 8.9 8.8 9.0 8.8 9.0   AST U/L  --   --   --   --  36   ALT U/L  --   --   --   --  39   ALK PHOS U/L  --   --   --   --  70   EGFR ml/min/1.73sq m 41 36 35 38 36       BMP:  Results from last 7 days   Lab Units 08/10/24  0333 24  0630 24  0546 24  0528 24  1405   POTASSIUM mmol/L 3.6 3.3* 3.6 3.5 4.3   CHLORIDE mmol/L 100 98 98 98 98   CO2 mmol/L 28 30 29 30 29   BUN mg/dL 34* 36* 35* 31* 31*   CREATININE mg/dL 1.24 1.38* 1.39* 1.32* 1.38*   CALCIUM mg/dL 8.9 8.8 9.0 8.8 9.0       Lab Results   Component Value Date     NTBNP 761 (H) 2021    NTBNP 2,773 (H) 2021    NTBNP 506 (H) 2019        Results from last 7 days   Lab Units 08/10/24  0333   MAGNESIUM mg/dL 1.9                         Lipid Profile:   Lab Results   Component Value Date    CHOL 205 2015    CHOL 195 07/10/2014     Lab Results   Component Value Date    HDL 28 (L) 2024    HDL 46 (L) 2024    HDL 53 2023     Lab Results   Component Value Date    LDLCALC 40 2024    LDLCALC 64 2024    LDLCALC 96 2023     Lab Results   Component Value Date    TRIG 55 2024    TRIG 63 2024    TRIG 68 2023       Cardiac testing:   Results for orders placed during the hospital encounter of 21    Echo complete with contrast if indicated    [address] [id number] [phone]    Transthoracic Echocardiogram  2D, M-mode, Doppler, and Color Doppler    Study date:  2021    Patient: TANISHA LEVINE  MR number: [id number]  Account number: [id number]  : 1945  Age: 75 years  Gender: Female  Status: Inpatient  Location: Bedside  Height: 62 in  Weight: 151.6 lb  BP: 126/ 94 mmHg    Indications: Atrial fibrillation    Diagnoses: I48.0 - Atrial fibrillation    Sonographer:  IGNACIO Cameron  Primary Physician:  Naveen Monsivais MD  Referring Physician:  Shantal Huff MD  Group:  [facility]  Interpreting Physician:  Kwabena Seymour MD    SUMMARY    LEFT VENTRICLE:  Systolic function was normal. Ejection fraction was estimated to be 55 %.  There were no regional wall motion abnormalities.  Wall thickness was at the upper limits of normal.  Doppler parameters were consistent with elevated ventricular end-diastolic filling pressure.    LEFT ATRIUM:  The atrium was mildly to moderately dilated.    RIGHT ATRIUM:  The atrium was mildly dilated.    MITRAL VALVE:  There was mild stenosis.    TRICUSPID VALVE:  There was mild to  moderate regurgitation.  Pulmonary artery systolic pressure was mildly increased.    HISTORY: PRIOR HISTORY: HLD, HTN, PAF, chronic CHF, thyroid nodule    PROCEDURE: The procedure was performed at the bedside. This was a routine study. The transthoracic approach was used. The study included complete 2D imaging, M-mode, complete spectral Doppler, and color Doppler. The heart rate was 101 bpm,  at the start of the study. Images were obtained from the parasternal, apical, subcostal, and suprasternal notch acoustic windows. Echocardiographic views were limited due to lung interference. This was a technically difficult study.    LEFT VENTRICLE: Size was normal. Systolic function was normal. Ejection fraction was estimated to be 55 %. There were no regional wall motion abnormalities. Wall thickness was at the upper limits of normal. DOPPLER: Transmitral flow  pattern: atrial fibrillation. Left ventricular diastolic function parameters were abnormal. Doppler parameters were consistent with elevated ventricular end-diastolic filling pressure.    RIGHT VENTRICLE: The size was normal. Systolic function was normal. Wall thickness was normal.    LEFT ATRIUM: The atrium was mildly to moderately dilated.    RIGHT ATRIUM: The atrium was mildly dilated.    MITRAL VALVE: Valve structure was normal. There was normal leaflet separation. DOPPLER: The transmitral velocity was within the normal range. There was mild stenosis. There was no significant regurgitation.    AORTIC VALVE: The valve was trileaflet. Leaflets exhibited normal thickness and normal cuspal separation. DOPPLER: Transaortic velocity was within the normal range. There was no evidence for stenosis. There was no significant  regurgitation.    TRICUSPID VALVE: The valve structure was normal. There was normal leaflet separation. DOPPLER: The transtricuspid velocity was within the normal range. There was no evidence for stenosis. There was mild to moderate regurgitation.  Pulmonary  artery systolic pressure was mildly increased.    PULMONIC VALVE: Leaflets exhibited normal thickness, no calcification, and normal cuspal separation. DOPPLER: The transpulmonic velocity was within the normal range. There was no significant regurgitation.    PERICARDIUM: There was no pericardial effusion. The pericardium was normal in appearance.    AORTA: The root exhibited normal size.    SYSTEMIC VEINS: IVC: The inferior vena cava was normal in size.    PULMONARY VEINS: DOPPLER: Doppler signals were not recordable in the pulmonary vein(s).    SYSTEM MEASUREMENT TABLES    2D  %FS: 37.15 %  Ao Diam: 2.74 cm  Ao asc: 2.72 cm  EDV(Teich): 103.17 ml  EF(Teich): 67.06 %  ESV(Teich): 33.98 ml  IVSd: 0.94 cm  LA Area: 13.2 cm2  LA Diam: 3.98 cm  LVEDV MOD A4C: 32.01 ml  LVEF MOD A4C: 63.74 %  LVESV MOD A4C: 11.61 ml  LVIDd: 4.72 cm  LVIDs: 2.96 cm  LVLd A4C: 5.9 cm  LVLs A4C: 4.79 cm  LVPWd: 0.93 cm  RA Area: 8.67 cm2  RVIDd: 3.35 cm  SV MOD A4C: 20.4 ml  SV(Teich): 69.19 ml    CW  TR MaxP.39 mmHg  TR Vmax: 2.89 m/s    MM  TAPSE: 1.51 cm    [facility] Accredited Echocardiography Laboratory    Prepared and electronically signed by    Kwabena Seymour MD  Signed 2021 11:05:34    No results found for this or any previous visit.    No results found for this or any previous visit.    No valid procedures specified.  Results for orders placed during the hospital encounter of 10/05/22    NM myocardial perfusion spect (stress and/or rest)    Interpretation Summary    Resting ECG: ECG is abnormal. Resting ECG shows no ST-segment deviation. Patient a paced and V sensed.    Perfusion Defect Conclusion: The stress/rest perfusion ratio is 1.04 . There is no evidence of transient ischemic dilation (TID).    Perfusion: There is a left ventricular perfusion defect that is small in size with mild reduction in uptake present in the mid to apical anterior and anteroseptal location(s) that is predominantly  fixed. The defect appears to be an artifact caused by breast attenuation.    Stress Function: Left ventricular function post-stress is normal. Post-stress ejection fraction is 70 %.    Negative exercise pharmacological stress test      Meds/Allergies   all current active meds have been reviewed and current meds:   Current Facility-Administered Medications   Medication Dose Route Frequency    acetaminophen (TYLENOL) tablet 650 mg  650 mg Oral Q8H PRN    albuterol (PROVENTIL HFA,VENTOLIN HFA) inhaler 2 puff  2 puff Inhalation Q6H PRN    apixaban (ELIQUIS) tablet 5 mg  5 mg Oral BID    ascorbic acid (VITAMIN C) tablet 500 mg  500 mg Oral Daily    benzonatate (TESSALON PERLES) capsule 100 mg  100 mg Oral TID PRN    bumetanide (BUMEX) injection 2 mg  2 mg Intravenous BID    Cholecalciferol (VITAMIN D3) tablet 1,000 Units  1,000 Units Oral Daily    dextromethorphan-guaiFENesin (ROBITUSSIN DM) oral syrup 10 mL  10 mL Oral Q4H PRN    diltiazem (CARDIZEM CD) 24 hr capsule 120 mg  120 mg Oral Daily    ezetimibe (ZETIA) tablet 10 mg  10 mg Oral HS    famotidine (PEPCID) tablet 20 mg  20 mg Oral BID    flecainide (TAMBOCOR) tablet 150 mg  150 mg Oral BID    gabapentin (NEURONTIN) capsule 300 mg  300 mg Oral HS    insulin glargine (LANTUS) subcutaneous injection 10 Units 0.1 mL  10 Units Subcutaneous HS    insulin lispro (HumALOG/ADMELOG) 100 units/mL subcutaneous injection 1-5 Units  1-5 Units Subcutaneous TID AC    levalbuterol (XOPENEX) inhalation solution 1.25 mg  1.25 mg Nebulization TID    loratadine (CLARITIN) tablet 10 mg  10 mg Oral Daily    metoprolol succinate (TOPROL-XL) 24 hr tablet 75 mg  75 mg Oral Q12H    potassium chloride (Klor-Con M20) CR tablet 20 mEq  20 mEq Oral BID    rOPINIRole (REQUIP) tablet 2 mg  2 mg Oral HS    zolpidem (AMBIEN) tablet 10 mg  10 mg Oral HS            Counseling / Coordination of Care  Total floor / unit time spent today 20 minutes.  Greater than 50% of total time was spent with the  patient and / or family counseling and / or coordination of care.      ** Please Note: Dragon 360 Dictation voice to text software may have been used in the creation of this document. **

## 2024-08-10 NOTE — PROGRESS NOTES
[facility]  Progress Note  Name: Farnaz Martin I  MRN: [id number]  Unit/Bed#: S -01 I Date of Admission: 8/6/2024   Date of Service: 8/10/2024 I Hospital Day: 4    Assessment & Plan   * Acute on chronic heart failure (HCC)  Assessment & Plan    Lab Results   Component Value Date    LVEF 50 07/14/2024    BNP 1,091 (H) 08/06/2024    BNP 1,428 (H) 07/24/2024     Acute and chronic respiratory failure with hypoxia 2/2 CHF exacerbation a/e/b hypoxia, increased oxygen demands requiring CXR, Nebs, IV Lasix and 4L NC oxygen.    Presents with increased shortness of breath, dyspnea on exertion, lower extremity edema, and cough with productive sputum  Patient is currently volume overloaded.  3 to 4-day history of progressively worsening dyspnea, dry cough, worsening lower extremity edema.  She required increased oxygen when EMS arrived. difficulty talking normal sentences secondary to shortness of breath.  She is now unable to lie flat.  She was discharged on recent admission with 10 mg torsemide daily  Findings: ED provider: She is at baseline on 2 L O2 supplemental O2 and, because of hypoxia to the 80s, was bumped up to 4.  On arrival 97% on 4L (baseline chronic 2L NC)  Signs and symptoms indicative of acute CHF exacerbation secondary to under diuresis.  Patient was initially on 2 L of oxygen, presents with increased work of breathing, dry cough, + hepatojular reflux, appeared volume overloaded.  Currently pt is on room air saturating appopriately for her, diuresed approximately 1.55L of fluid since admission, improving SOB     CXR 8/9/24: shows stable pleural effusion   Most recent Echo 50% LVEF     Wt Readings from Last 3 Encounters:   08/10/24 73.2 kg (161 lb 6 oz)   07/29/24 72 kg (158 lb 12.8 oz)   07/28/24 71.9 kg (158 lb 8.2 oz)     I/O: - 2.2 L since admission    Plan:  Per cardiology: increase IV Bumex to 2 mg BID.   Start kdur 20 meq BID. , B-Blocker: Toprol  Received an  additional 1 mg dose of IV Bumex on 8/9/24  Possible transition to P.O. Bumex tomorrow.   Cardiac diet, sodium restriction  CMP, magnesium a.m; Goal Mg > 2 and K > 4; Replete prn  Daily standing weights, Measure I/O  Discuss pleural effusions with pulmonology          Cough  Assessment & Plan  Pt endorsing orthopnea, SOB  Productive in nature without production of sputum  Uses Spiriva inhaler and Robitussin at home for sx's  R/p chest x-ray shows increased congestion, persistent pleural effusion  Cough component likely CHF exacerbation  Pulmonology was consulted, recommended no procedural interventions at this time, but recommended   -Continue XOPENEX inhalation solution 1.25 mg   -Continue home inhalers and nebulizer treatment on discharge.   -Possible home oxygen evaluation     Plan:  Atrovent neb four times daily  XOPENEX inhalation solution 1.25 mg   Continue home inhalers and nebulizer treatment on discharge.   Possible home oxygen evaluation       Paroxysmal A-fib (HCC)  Assessment & Plan  Afib-  S/p ablation in 03/2021.   EKG- AV dual-paced rhythm, Vent. rate has decreased by 14 BPM since prior EKG  Low voltage QRS    Plan:  Eliquis 5 mg twice daily  Continue flecainide 150 mg twice daily and metoprolol succinate 75 mg twice daily  Continue diltiazem 120 mg daily    Elevated serum creatinine  Assessment & Plan  Improved, currently at baseline  Recent Labs     08/08/24  0546 08/09/24  0630 08/10/24  0333   CREATININE 1.39* 1.38* 1.24   EGFR 35 36 41       Estimated Creatinine Clearance: 33.9 mL/min (by C-G formula based on SCr of 1.24 mg/dL).    POA 1.38; (baseline 1.1-1.28)  Likely secondary to fluid overload due to being under diuresed at home with 10 mg torsemide daily.    Plan:  IV Bumex 1mg twice daily  Monitor BMP, IV fluids held  Avoid hypoperfusion of the kidneys, minimize nephrotoxins  Currently at baseline, continue to monitor with daily labs         VTE Pharmacologic Prophylaxis: VTE Score: 5 High  Risk (Score >/= 5) - Pharmacological DVT Prophylaxis Ordered: apixaban (Eliquis). Sequential Compression Devices Ordered.    Mobility:   Basic Mobility Inpatient Raw Score: 23  JH-HLM Goal: 7: Walk 25 feet or more  JH-HLM Achieved: 7: Walk 25 feet or more  JH-HLM Goal achieved. Continue to encourage appropriate mobility.    Patient Centered Rounds: I performed bedside rounds with nursing staff today.  Discussions with Specialists or Other Care Team Provider: cm    Education and Discussions with Family / Patient: Updated  (daughter) via phone.    Current Length of Stay: 4 day(s)  Current Patient Status: Inpatient   Discharge Plan: Anticipate discharge in 24-48 hrs to rehab facility.    Code Status: Level 3 - DNAR and DNI    Subjective:   Patient evaluated at bedside this morning. No overnight events, no sights of distress. Patient completing breathing treatment at the time of this encounter. She endorses improvement in her breathing with continued cough. No CP, shortness of breath, or palpitations. No other concerns at this time.     Objective:     Vitals:   Temp (24hrs), Av °F (36.7 °C), Min:97.5 °F (36.4 °C), Max:98.2 °F (36.8 °C)    Temp:  [97.5 °F (36.4 °C)-98.2 °F (36.8 °C)] 98.2 °F (36.8 °C)  HR:  [60-67] 60  Resp:  [17-18] 17  BP: ()/(51-83) 117/51  SpO2:  [95 %-97 %] 95 %  Body mass index is 29.52 kg/m².     Input and Output Summary (last 24 hours):     Intake/Output Summary (Last 24 hours) at 8/10/2024 1152  Last data filed at 8/10/2024 1000  Gross per 24 hour   Intake 840 ml   Output 1000 ml   Net -160 ml       Physical Exam:   Physical Exam  Vitals reviewed.   Constitutional:       General: She is not in acute distress.  HENT:      Head: Normocephalic and atraumatic.      Mouth/Throat:      Mouth: Mucous membranes are moist.      Pharynx: Oropharynx is clear.   Eyes:      Conjunctiva/sclera: Conjunctivae normal.   Cardiovascular:      Rate and Rhythm: Normal rate and regular rhythm.    Pulmonary:      Effort: Pulmonary effort is normal. No respiratory distress.      Breath sounds: Rales (prominent in RLL) present. No wheezing.      Comments: Diminished RLL breath sounds  Abdominal:      General: Bowel sounds are normal. There is no distension.      Palpations: Abdomen is soft.      Tenderness: There is no abdominal tenderness.   Musculoskeletal:      Right lower leg: Edema present.      Left lower leg: Edema present.   Skin:     General: Skin is warm and dry.      Capillary Refill: Capillary refill takes less than 2 seconds.   Neurological:      Mental Status: She is alert.   Psychiatric:         Mood and Affect: Mood normal.         Behavior: Behavior normal.        Additional Data:     Labs:  Results from last 7 days   Lab Units 08/10/24  0333   WBC Thousand/uL 5.55   HEMOGLOBIN g/dL 10.6*   HEMATOCRIT % 33.5*   PLATELETS Thousands/uL 208   SEGS PCT % 48   LYMPHO PCT % 29   MONO PCT % 15*   EOS PCT % 7*     Results from last 7 days   Lab Units 08/10/24  0333 08/07/24  0528 08/06/24  1405   SODIUM mmol/L 137   < > 135   POTASSIUM mmol/L 3.6   < > 4.3   CHLORIDE mmol/L 100   < > 98   CO2 mmol/L 28   < > 29   BUN mg/dL 34*   < > 31*   CREATININE mg/dL 1.24   < > 1.38*   ANION GAP mmol/L 9   < > 8   CALCIUM mg/dL 8.9   < > 9.0   ALBUMIN g/dL  --   --  3.7   TOTAL BILIRUBIN mg/dL  --   --  0.87   ALK PHOS U/L  --   --  70   ALT U/L  --   --  39   AST U/L  --   --  36   GLUCOSE RANDOM mg/dL 76   < > 138    < > = values in this interval not displayed.         Results from last 7 days   Lab Units 08/10/24  0728 08/09/24  2046 08/09/24  1601 08/09/24  1155 08/09/24  0744 08/08/24  2155 08/08/24  1659 08/08/24  1126 08/08/24  0730 08/07/24  2052 08/07/24  1731   POC GLUCOSE mg/dl 74 120 164* 166* 62* 166* 163* 122 96 184* 155*               Lines/Drains:  Invasive Devices       Peripheral Intravenous Line  Duration             Peripheral IV 08/06/24 Left Antecubital 3 days                    Imaging:  Reviewed radiology reports from this admission including: chest xray    Last 24 Hours Medication List:   Current Facility-Administered Medications   Medication Dose Route Frequency Provider Last Rate    acetaminophen  650 mg Oral Q8H PRN Steve Tripathi MD      albuterol  2 puff Inhalation Q6H PRN Steve Tripathi MD      apixaban  5 mg Oral BID Steve Tripathi MD      ascorbic acid  500 mg Oral Daily Steve Tripathi MD      benzonatate  100 mg Oral TID PRN Steve Tripathi MD      bumetanide  2 mg Intravenous BID JENNY Hou      [START ON 8/11/2024] bumetanide  2 mg Oral Daily JENNY Hou      Cholecalciferol  1,000 Units Oral Daily Steve Tripathi MD      dextromethorphan-guaiFENesin  10 mL Oral Q4H PRN Odin Castro MD      diltiazem  120 mg Oral Daily Steve Tripathi MD      ezetimibe  10 mg Oral HS Steve Tripathi MD      famotidine  20 mg Oral BID Steve Tripathi MD      flecainide  150 mg Oral BID Steve Tripathi MD      gabapentin  300 mg Oral HS Steve Tripathi MD      insulin glargine  10 Units Subcutaneous HS Steve Tripathi MD      insulin lispro  1-5 Units Subcutaneous TID AC Steve Tripathi MD      levalbuterol  1.25 mg Nebulization TID Fauzia Pennington MD      loratadine  10 mg Oral Daily Steve Tripathi MD      metoprolol succinate  75 mg Oral Q12H Steve Tripathi MD      potassium chloride  20 mEq Oral BID JENNY Hou      rOPINIRole  2 mg Oral HS Steve Tripathi MD      zolpidem  10 mg Oral HS Steve Tripathi MD          Today, Patient Was Seen By: Anuradha Edwards DO    **Please Note: This note may have been constructed using a voice recognition system.**

## 2024-08-10 NOTE — PROGRESS NOTES
Progress Note - Pulmonary   Farnaz Martin 79 y.o. female MRN: [id number]  Unit/Bed#: S -01 Encounter: [id number]    Assessment:  Patient is an 79-year-old female with heart failure and chronic right pleural effusion.  The patient has responded quite well to diuresis.  She notes no significant benefit to previous thoracentesis therefore I will hold off until her breathing status worsens to the point making intervention necessary.  Would continue diuresis per primary team.  Patient is interested in supple oxygen therefore would recommend checking amatory pulse ox prior to discharge.    Persistent right pleural effusion  - stable on imaging  - symptomatically improving   - diuresis continued per primary team and cardiology    Shortness of breath  -  improving  - check ambulatory pulse ox prior to discharge    Plan of care discussed with FLAKITA.    Chief Complaint:   Feeling pretty good    Subjective:   No acute events overnight.  Patient is resting comfortably.  She has no complaints at the current time.  She notes her breathing feels better each day.    Objective:     Vitals: Blood pressure 117/51, pulse 60, temperature 98.2 °F (36.8 °C), temperature source Oral, resp. rate 17, weight 73.2 kg (161 lb 6 oz), last menstrual period 02/01/1990, SpO2 95%, not currently breastfeeding.,Body mass index is 29.52 kg/m².      Intake/Output Summary (Last 24 hours) at 8/10/2024 1400  Last data filed at 8/10/2024 1000  Gross per 24 hour   Intake 720 ml   Output 1000 ml   Net -280 ml       Invasive Devices       Peripheral Intravenous Line  Duration             Peripheral IV 08/06/24 Left Antecubital 3 days                    Physical Exam  Vitals and nursing note reviewed.   Constitutional:       General: She is not in acute distress.     Appearance: She is well-developed. She is not diaphoretic.   HENT:      Head: Normocephalic and atraumatic.   Eyes:      Extraocular Movements: EOM normal.      Pupils: Pupils are equal,  round, and reactive to light.   Cardiovascular:      Rate and Rhythm: Normal rate.   Pulmonary:      Breath sounds: Examination of the right-lower field reveals decreased breath sounds. Decreased breath sounds present. No wheezing, rhonchi or rales.   Chest:      Chest wall: No tenderness or edema.   Musculoskeletal:      Cervical back: Normal range of motion and neck supple.   Skin:     General: Skin is warm and dry.   Neurological:      General: No focal deficit present.      Mental Status: She is oriented to person, place, and time.   Psychiatric:         Mood and Affect: Mood normal. Mood is not anxious.         Behavior: Behavior normal. Behavior is not agitated.           Labs: I have personally reviewed pertinent lab results.  Lab Results   Component Value Date    SODIUM 137 08/10/2024    K 3.6 08/10/2024     08/10/2024    CO2 28 08/10/2024    BUN 34 (H) 08/10/2024    CREATININE 1.24 08/10/2024    GLUC 76 08/10/2024    CALCIUM 8.9 08/10/2024     Lab Results   Component Value Date    WBC 5.55 08/10/2024    HGB 10.6 (L) 08/10/2024    HCT 33.5 (L) 08/10/2024    MCV 91 08/10/2024     08/10/2024     Imaging and other studies: I have personally reviewed pertinent reports.   and I have personally reviewed pertinent films in PACS  CXR 8/9/2024:   Left chest wall intracardiac device with intact lead(s). No abandoned lead(s).  Stable appearance of consolidation in the right lower lung field with right pleural effusion. Linear density left lower lung field stable as well, probably scar or atelectasis. Small left effusion. No pneumothorax. Some perihilar edema on the left.  Stable cardiomediastinal silhouette  Bones are unremarkable for age.  Normal upper abdomen.

## 2024-08-10 NOTE — PLAN OF CARE

## 2024-08-10 NOTE — ASSESSMENT & PLAN NOTE
Improved, currently at baseline  Recent Labs     08/08/24  0546 08/09/24  0630 08/10/24  0333   CREATININE 1.39* 1.38* 1.24   EGFR 35 36 41       Estimated Creatinine Clearance: 33.9 mL/min (by C-G formula based on SCr of 1.24 mg/dL).    POA 1.38; (baseline 1.1-1.28)  Likely secondary to fluid overload due to being under diuresed at home with 10 mg torsemide daily.    Plan:  IV Bumex 1mg twice daily  Monitor BMP, IV fluids held  Avoid hypoperfusion of the kidneys, minimize nephrotoxins  Currently at baseline, continue to monitor with daily labs

## 2024-08-11 ENCOUNTER — APPOINTMENT (INPATIENT)
Dept: RADIOLOGY | Facility: HOSPITAL | Age: 79
DRG: 291 | End: 2024-08-11
Payer: MEDICARE

## 2024-08-11 LAB
ANION GAP SERPL CALCULATED.3IONS-SCNC: 10 MMOL/L (ref 4–13)
BASOPHILS # BLD AUTO: 0.06 THOUSANDS/ÂΜL (ref 0–0.1)
BASOPHILS NFR BLD AUTO: 1 % (ref 0–1)
BUN SERPL-MCNC: 38 MG/DL (ref 5–25)
CALCIUM SERPL-MCNC: 9 MG/DL (ref 8.4–10.2)
CHLORIDE SERPL-SCNC: 98 MMOL/L (ref 96–108)
CO2 SERPL-SCNC: 27 MMOL/L (ref 21–32)
CREAT SERPL-MCNC: 1.35 MG/DL (ref 0.6–1.3)
EOSINOPHIL # BLD AUTO: 0.18 THOUSAND/ÂΜL (ref 0–0.61)
EOSINOPHIL NFR BLD AUTO: 3 % (ref 0–6)
ERYTHROCYTE [DISTWIDTH] IN BLOOD BY AUTOMATED COUNT: 14.6 % (ref 11.6–15.1)
GFR SERPL CREATININE-BSD FRML MDRD: 37 ML/MIN/1.73SQ M
GLUCOSE SERPL-MCNC: 115 MG/DL (ref 65–140)
GLUCOSE SERPL-MCNC: 122 MG/DL (ref 65–140)
GLUCOSE SERPL-MCNC: 136 MG/DL (ref 65–140)
GLUCOSE SERPL-MCNC: 232 MG/DL (ref 65–140)
GLUCOSE SERPL-MCNC: 251 MG/DL (ref 65–140)
HCT VFR BLD AUTO: 34.5 % (ref 34.8–46.1)
HGB BLD-MCNC: 11 G/DL (ref 11.5–15.4)
IMM GRANULOCYTES # BLD AUTO: 0.01 THOUSAND/UL (ref 0–0.2)
IMM GRANULOCYTES NFR BLD AUTO: 0 % (ref 0–2)
LYMPHOCYTES # BLD AUTO: 1.49 THOUSANDS/ÂΜL (ref 0.6–4.47)
LYMPHOCYTES NFR BLD AUTO: 26 % (ref 14–44)
MAGNESIUM SERPL-MCNC: 1.9 MG/DL (ref 1.9–2.7)
MCH RBC QN AUTO: 28.8 PG (ref 26.8–34.3)
MCHC RBC AUTO-ENTMCNC: 31.9 G/DL (ref 31.4–37.4)
MCV RBC AUTO: 90 FL (ref 82–98)
MONOCYTES # BLD AUTO: 0.66 THOUSAND/ÂΜL (ref 0.17–1.22)
MONOCYTES NFR BLD AUTO: 12 % (ref 4–12)
NEUTROPHILS # BLD AUTO: 3.24 THOUSANDS/ÂΜL (ref 1.85–7.62)
NEUTS SEG NFR BLD AUTO: 58 % (ref 43–75)
NRBC BLD AUTO-RTO: 0 /100 WBCS
PLATELET # BLD AUTO: 237 THOUSANDS/UL (ref 149–390)
PMV BLD AUTO: 10.6 FL (ref 8.9–12.7)
POTASSIUM SERPL-SCNC: 4 MMOL/L (ref 3.5–5.3)
RBC # BLD AUTO: 3.82 MILLION/UL (ref 3.81–5.12)
SODIUM SERPL-SCNC: 135 MMOL/L (ref 135–147)
WBC # BLD AUTO: 5.64 THOUSAND/UL (ref 4.31–10.16)

## 2024-08-11 PROCEDURE — 99232 SBSQ HOSP IP/OBS MODERATE 35: CPT | Performed by: INTERNAL MEDICINE

## 2024-08-11 PROCEDURE — 85025 COMPLETE CBC W/AUTO DIFF WBC: CPT

## 2024-08-11 PROCEDURE — 71045 X-RAY EXAM CHEST 1 VIEW: CPT

## 2024-08-11 PROCEDURE — 83735 ASSAY OF MAGNESIUM: CPT

## 2024-08-11 PROCEDURE — 82948 REAGENT STRIP/BLOOD GLUCOSE: CPT

## 2024-08-11 PROCEDURE — 94760 N-INVAS EAR/PLS OXIMETRY 1: CPT

## 2024-08-11 PROCEDURE — 80048 BASIC METABOLIC PNL TOTAL CA: CPT

## 2024-08-11 PROCEDURE — 94640 AIRWAY INHALATION TREATMENT: CPT

## 2024-08-11 RX ADMIN — METOPROLOL SUCCINATE 75 MG: 50 TABLET, EXTENDED RELEASE ORAL at 06:18

## 2024-08-11 RX ADMIN — GUAIFENESIN AND DEXTROMETHORPHAN 10 ML: 100; 10 SYRUP ORAL at 17:29

## 2024-08-11 RX ADMIN — GABAPENTIN 300 MG: 300 CAPSULE ORAL at 21:05

## 2024-08-11 RX ADMIN — APIXABAN 5 MG: 5 TABLET, FILM COATED ORAL at 17:29

## 2024-08-11 RX ADMIN — APIXABAN 5 MG: 5 TABLET, FILM COATED ORAL at 08:56

## 2024-08-11 RX ADMIN — FLECAINIDE ACETATE 150 MG: 50 TABLET ORAL at 08:56

## 2024-08-11 RX ADMIN — ROPINIROLE HYDROCHLORIDE 2 MG: 1 TABLET, FILM COATED ORAL at 21:05

## 2024-08-11 RX ADMIN — FAMOTIDINE 20 MG: 20 TABLET, FILM COATED ORAL at 17:29

## 2024-08-11 RX ADMIN — LEVALBUTEROL HYDROCHLORIDE 1.25 MG: 1.25 SOLUTION RESPIRATORY (INHALATION) at 20:10

## 2024-08-11 RX ADMIN — BENZONATATE 100 MG: 100 CAPSULE ORAL at 17:29

## 2024-08-11 RX ADMIN — POTASSIUM CHLORIDE 20 MEQ: 1500 TABLET, EXTENDED RELEASE ORAL at 17:29

## 2024-08-11 RX ADMIN — BUMETANIDE 2 MG: 1 TABLET ORAL at 08:56

## 2024-08-11 RX ADMIN — DILTIAZEM HYDROCHLORIDE 120 MG: 120 CAPSULE, COATED, EXTENDED RELEASE ORAL at 08:56

## 2024-08-11 RX ADMIN — OXYCODONE HYDROCHLORIDE AND ACETAMINOPHEN 500 MG: 500 TABLET ORAL at 08:56

## 2024-08-11 RX ADMIN — LEVALBUTEROL HYDROCHLORIDE 1.25 MG: 1.25 SOLUTION RESPIRATORY (INHALATION) at 13:44

## 2024-08-11 RX ADMIN — FLECAINIDE ACETATE 150 MG: 50 TABLET ORAL at 17:29

## 2024-08-11 RX ADMIN — INSULIN LISPRO 2 UNITS: 100 INJECTION, SOLUTION INTRAVENOUS; SUBCUTANEOUS at 12:21

## 2024-08-11 RX ADMIN — FAMOTIDINE 20 MG: 20 TABLET, FILM COATED ORAL at 08:56

## 2024-08-11 RX ADMIN — METOPROLOL SUCCINATE 75 MG: 50 TABLET, EXTENDED RELEASE ORAL at 17:29

## 2024-08-11 RX ADMIN — INSULIN GLARGINE 10 UNITS: 100 INJECTION, SOLUTION SUBCUTANEOUS at 22:21

## 2024-08-11 RX ADMIN — EZETIMIBE 10 MG: 10 TABLET ORAL at 21:05

## 2024-08-11 RX ADMIN — LEVALBUTEROL HYDROCHLORIDE 1.25 MG: 1.25 SOLUTION RESPIRATORY (INHALATION) at 07:24

## 2024-08-11 RX ADMIN — POTASSIUM CHLORIDE 20 MEQ: 1500 TABLET, EXTENDED RELEASE ORAL at 08:56

## 2024-08-11 RX ADMIN — GUAIFENESIN AND DEXTROMETHORPHAN 10 ML: 100; 10 SYRUP ORAL at 21:05

## 2024-08-11 RX ADMIN — ZOLPIDEM TARTRATE 10 MG: 5 TABLET, COATED ORAL at 21:05

## 2024-08-11 RX ADMIN — Medication 1000 UNITS: at 08:56

## 2024-08-11 NOTE — ASSESSMENT & PLAN NOTE
Improved, currently at baseline  Recent Labs     08/08/24  0546 08/09/24  0630 08/10/24  0333   CREATININE 1.39* 1.38* 1.24   EGFR 35 36 41     Estimated Creatinine Clearance: 33.9 mL/min (by C-G formula based on SCr of 1.24 mg/dL).    POA 1.38; (baseline 1.1-1.28)  Likely secondary to fluid overload due to being under diuresed at home with 10 mg torsemide daily.    Plan:  PO Bumex 2mg twice daily  Monitor BMP, IV fluids held  Avoid hypoperfusion of the kidneys, minimize nephrotoxins  Currently at baseline, continue to monitor with daily labs

## 2024-08-11 NOTE — ASSESSMENT & PLAN NOTE
Pt endorsing orthopnea, SOB  Productive in nature without production of sputum  Uses Spiriva inhaler and Robitussin at home for sx's  R/p chest x-ray shows increased congestion, persistent pleural effusion that is worsening on chest x-rays  Cough component likely CHF exacerbation  Pulmonology was consulted, recommended no procedural interventions at this time, but recommended   -Continue XOPENEX inhalation solution 1.25 mg   -Continue home inhalers and nebulizer treatment on discharge.   -Possible home oxygen evaluation     Plan:  Atrovent neb four times daily  XOPENEX inhalation solution 1.25 mg   Continue home inhalers and nebulizer treatment on discharge.   Possible home oxygen evaluation   Appreciate pulmonary's reccs regarding worsening pleural effusion.

## 2024-08-11 NOTE — ASSESSMENT & PLAN NOTE
Lab Results   Component Value Date    LVEF 50 07/14/2024    BNP 1,091 (H) 08/06/2024    BNP 1,428 (H) 07/24/2024     Acute and chronic respiratory failure with hypoxia 2/2 CHF exacerbation a/e/b hypoxia, increased oxygen demands requiring CXR, Nebs, IV Lasix and 4L NC oxygen.    Presents with increased shortness of breath, dyspnea on exertion, lower extremity edema, and cough with productive sputum  Patient is currently volume overloaded.  3 to 4-day history of progressively worsening dyspnea, dry cough, worsening lower extremity edema.  She required increased oxygen when EMS arrived. difficulty talking normal sentences secondary to shortness of breath.  She is now unable to lie flat.  She was discharged on recent admission with 10 mg torsemide daily  Findings: ED provider: She is at baseline on 2 L O2 supplemental O2 and, because of hypoxia to the 80s, was bumped up to 4.  On arrival 97% on 4L (baseline chronic 2L NC)  Signs and symptoms indicative of acute CHF exacerbation secondary to under diuresis.  Patient was initially on 2 L of oxygen, presents with increased work of breathing, dry cough, + hepatojular reflux, appeared volume overloaded.  Currently pt is on room air saturating appopriately for her, diuresed approximately 3.1L of fluid since admission, improving SOB     CXR 8/9/24: shows stable pleural effusion   Most recent Echo 50% LVEF     Wt Readings from Last 3 Encounters:   08/10/24 73.2 kg (161 lb 6 oz)   07/29/24 72 kg (158 lb 12.8 oz)   07/28/24 71.9 kg (158 lb 8.2 oz)     I/O: - 3.1 L since admission    Plan:  Per cardiology: increase IV Bumex to 2 mg BID.   kdur 20 meq BID. , B-Blocker: Toprol  Received an additional 1 mg dose of IV Bumex on 8/9/24   P.O. Bumex 2mg BID  Cardiac diet, sodium restriction  CMP, magnesium a.m; Goal Mg > 2 and K > 4; Replete prn  Daily standing weights, Measure I/O  Discuss pleural effusions with pulmonology

## 2024-08-11 NOTE — PROGRESS NOTES
Cardiology Progress Note - Farnaz Martin 79 y.o. female MRN: [id number]    Unit/Bed#: S -01 Encounter: [id number]      Assessment/Recommendations:  1.   Acute on chronic heart failure with preserved ejection fraction: Very difficult to manage diuretic regiment that she would be taking at home considering very brittle heart failure versus dehydration status.  Agree that torsemide 10 mg daily is likely insufficient for maintenance diuretic purposes.  Now transition to p.o. Bumex, which she will start this morning.  Strict I's and O's and daily weights.  Recheck creatinine in a.m.  Will have heart failure team evaluate the patient on Monday.    2.  Recurrent right-sided pleural effusion: Continues with pulmonary edema on chest x-ray with increased right sided pleural effusion.  Appreciate pulmonary consultation, patient currently declining Pleurx catheter and further thoracentesis at the current time.  Would repeat chest x-ray after further diuresis and after transition to p.o. diuretic - recheck in AM.  3.  Paroxysmal atrial fibrillation/flutter and paroxysmal atrial tachycardia: Status post atrial fibrillation and flutter ablation in the past.  Continue on Cardizem and flecainide along with beta-blocker for management.  Continues on Eliquis for anticoagulation.  4.  Sick sinus syndrome: Status post permanent pacemaker.  Continue routine device interrogations.  5.  Hypertension: Continues on Cardizem and metoprolol for management along with diuretic.  6.  Hyperlipidemia: Continued on Zetia.  7.  Type 2 diabetes mellitus: Management as per primary team.    Subjective:   Patient seen and examined.  No significant events overnight.  stable dyspnea on exertion, ; pertinent negatives - chest pain, irregular heart beat, and palpitations.    Objective:     Vitals: Blood pressure 128/83, pulse 61, temperature 97.7 °F (36.5 °C), resp. rate 17, weight 73 kg (160 lb 15 oz), last menstrual period 02/01/1990, SpO2 95%,  not currently breastfeeding., Body mass index is 29.44 kg/m².,   Orthostatic Blood Pressures      Flowsheet Row Most Recent Value   Blood Pressure 128/83 filed at 08/11/2024 0800   Patient Position - Orthostatic VS Lying filed at 08/10/2024 0724              Intake/Output Summary (Last 24 hours) at 8/11/2024 0953  Last data filed at 8/11/2024 0900  Gross per 24 hour   Intake 680 ml   Output 1600 ml   Net -920 ml       Physical Exam:    GEN: Farnaz Martin appears well, alert and oriented x 3, pleasant and cooperative   HEENT: pupils equal, round, and reactive to light; extraocular muscles intact  NECK: supple, no carotid bruits   HEART: regular rhythm, normal S1 and S2, no murmurs, clicks, gallops or rubs   LUNGS: clear to auscultation bilaterally; no wheezes, rales, or rhonchi   ABDOMEN: normal bowel sounds, soft, no tenderness, no distention  EXTREMITIES: peripheral pulses normal; no clubbing, cyanosis, or edema  NEURO: no focal findings   SKIN: normal without suspicious lesions on exposed skin    Medications:      Current Facility-Administered Medications:     acetaminophen (TYLENOL) tablet 650 mg, 650 mg, Oral, Q8H PRN, Steve Tripathi MD, 650 mg at 08/10/24 1242    albuterol (PROVENTIL HFA,VENTOLIN HFA) inhaler 2 puff, 2 puff, Inhalation, Q6H PRN, Steve Tripathi MD, 2 puff at 08/06/24 1914    apixaban (ELIQUIS) tablet 5 mg, 5 mg, Oral, BID, Steve Tripathi MD, 5 mg at 08/11/24 0856    ascorbic acid (VITAMIN C) tablet 500 mg, 500 mg, Oral, Daily, Steve Tripathi MD, 500 mg at 08/11/24 0856    benzonatate (TESSALON PERLES) capsule 100 mg, 100 mg, Oral, TID PRN, Steve Tripathi MD, 100 mg at 08/10/24 1238    bumetanide (BUMEX) tablet 2 mg, 2 mg, Oral, Daily, JENNY Hou, 2 mg at 08/11/24 0856    Cholecalciferol (VITAMIN D3) tablet 1,000 Units, 1,000 Units, Oral, Daily, Steve Tripathi MD, 1,000 Units at 08/11/24 0856    dextromethorphan-guaiFENesin (ROBITUSSIN DM) oral syrup 10 mL, 10 mL, Oral, Q4H PRN,  Odin Castro MD, 10 mL at 08/10/24 1732    diltiazem (CARDIZEM CD) 24 hr capsule 120 mg, 120 mg, Oral, Daily, Steve Tripathi MD, 120 mg at 08/11/24 0856    ezetimibe (ZETIA) tablet 10 mg, 10 mg, Oral, HS, Steve Tripathi MD, 10 mg at 08/10/24 2112    famotidine (PEPCID) tablet 20 mg, 20 mg, Oral, BID, Steve Tripathi MD, 20 mg at 08/11/24 0856    flecainide (TAMBOCOR) tablet 150 mg, 150 mg, Oral, BID, Steve Tripathi MD, 150 mg at 08/11/24 0856    gabapentin (NEURONTIN) capsule 300 mg, 300 mg, Oral, HS, Steve Tripathi MD, 300 mg at 08/10/24 2112    insulin glargine (LANTUS) subcutaneous injection 10 Units 0.1 mL, 10 Units, Subcutaneous, HS, Steve Tripathi MD, 10 Units at 08/10/24 2114    insulin lispro (HumALOG/ADMELOG) 100 units/mL subcutaneous injection 1-5 Units, 1-5 Units, Subcutaneous, TID AC, 1 Units at 08/09/24 1729 **AND** Fingerstick Glucose (POCT), , , TID AC, Steve Tripathi MD    levalbuterol (XOPENEX) inhalation solution 1.25 mg, 1.25 mg, Nebulization, TID, Fauzia Pennington MD, 1.25 mg at 08/11/24 0724    loratadine (CLARITIN) tablet 10 mg, 10 mg, Oral, Daily, Steve Tripathi MD, 10 mg at 08/10/24 0913    metoprolol succinate (TOPROL-XL) 24 hr tablet 75 mg, 75 mg, Oral, Q12H, Steve Tripathi MD, 75 mg at 08/11/24 0618    potassium chloride (Klor-Con M20) CR tablet 20 mEq, 20 mEq, Oral, BID, Xavi Perez, PAYTONNP, 20 mEq at 08/11/24 0856    rOPINIRole (REQUIP) tablet 2 mg, 2 mg, Oral, HS, Steve Tripathi MD, 2 mg at 08/10/24 2112    zolpidem (AMBIEN) tablet 10 mg, 10 mg, Oral, HS, Steve Tripathi MD, 10 mg at 08/10/24 2112     Labs & Results:        Results from last 7 days   Lab Units 08/11/24  0605 08/10/24  0333 08/09/24  0630   WBC Thousand/uL 5.64 5.55 5.72   HEMOGLOBIN g/dL 11.0* 10.6* 10.5*   HEMATOCRIT % 34.5* 33.5* 33.0*   PLATELETS Thousands/uL 237 208 225         Results from last 7 days   Lab Units 08/11/24  0605 08/10/24  0333 08/09/24  0630 08/07/24  0528 08/06/24  1405   POTASSIUM mmol/L  4.0 3.6 3.3*   < > 4.3   CHLORIDE mmol/L 98 100 98   < > 98   CO2 mmol/L 27 28 30   < > 29   BUN mg/dL 38* 34* 36*   < > 31*   CREATININE mg/dL 1.35* 1.24 1.38*   < > 1.38*   CALCIUM mg/dL 9.0 8.9 8.8   < > 9.0   ALK PHOS U/L  --   --   --   --  70   ALT U/L  --   --   --   --  39   AST U/L  --   --   --   --  36    < > = values in this interval not displayed.         Results from last 7 days   Lab Units 08/11/24  0605 08/10/24  0333   MAGNESIUM mg/dL 1.9 1.9       Echo: personally reviewed -ejection fraction 50%, dilated RV, dilated left atrium.  Mild regurgitation of the mitral valve.  Moderate tricuspid regurgitation with moderate pulmonary hypertension.    EKG personally reviewed by Jenaro Stephens MD.

## 2024-08-11 NOTE — PROGRESS NOTES
[facility]  Progress Note  Name: Farnaz Martin I  MRN: [id number]  Unit/Bed#: S -01 I Date of Admission: 8/6/2024   Date of Service: 8/11/2024 I Hospital Day: 5    Assessment & Plan   * Acute on chronic heart failure (HCC)  Assessment & Plan    Lab Results   Component Value Date    LVEF 50 07/14/2024    BNP 1,091 (H) 08/06/2024    BNP 1,428 (H) 07/24/2024     Acute and chronic respiratory failure with hypoxia 2/2 CHF exacerbation a/e/b hypoxia, increased oxygen demands requiring CXR, Nebs, IV Lasix and 4L NC oxygen.    Presents with increased shortness of breath, dyspnea on exertion, lower extremity edema, and cough with productive sputum  Patient is currently volume overloaded.  3 to 4-day history of progressively worsening dyspnea, dry cough, worsening lower extremity edema.  She required increased oxygen when EMS arrived. difficulty talking normal sentences secondary to shortness of breath.  She is now unable to lie flat.  She was discharged on recent admission with 10 mg torsemide daily  Findings: ED provider: She is at baseline on 2 L O2 supplemental O2 and, because of hypoxia to the 80s, was bumped up to 4.  On arrival 97% on 4L (baseline chronic 2L NC)  Signs and symptoms indicative of acute CHF exacerbation secondary to under diuresis.  Patient was initially on 2 L of oxygen, presents with increased work of breathing, dry cough, + hepatojular reflux, appeared volume overloaded.  Currently pt is on room air saturating appopriately for her, diuresed approximately 3.1L of fluid since admission, improving SOB     CXR 8/9/24: shows stable pleural effusion   Most recent Echo 50% LVEF     Wt Readings from Last 3 Encounters:   08/10/24 73.2 kg (161 lb 6 oz)   07/29/24 72 kg (158 lb 12.8 oz)   07/28/24 71.9 kg (158 lb 8.2 oz)     I/O: - 3.1 L since admission    Plan:  Per cardiology: increase IV Bumex to 2 mg BID.   kdur 20 meq BID. , B-Blocker: Toprol  Received an additional 1 mg  dose of IV Bumex on 8/9/24   P.O. Bumex 2mg BID  Cardiac diet, sodium restriction  CMP, magnesium a.m; Goal Mg > 2 and K > 4; Replete prn  Daily standing weights, Measure I/O  Discuss pleural effusions with pulmonology          Elevated serum creatinine  Assessment & Plan  Improved, currently at baseline  Recent Labs     08/08/24  0546 08/09/24  0630 08/10/24  0333   CREATININE 1.39* 1.38* 1.24   EGFR 35 36 41     Estimated Creatinine Clearance: 33.9 mL/min (by C-G formula based on SCr of 1.24 mg/dL).    POA 1.38; (baseline 1.1-1.28)  Likely secondary to fluid overload due to being under diuresed at home with 10 mg torsemide daily.    Plan:  PO Bumex 2mg twice daily  Monitor BMP, IV fluids held  Avoid hypoperfusion of the kidneys, minimize nephrotoxins  Currently at baseline, continue to monitor with daily labs    Cough  Assessment & Plan  Pt endorsing orthopnea, SOB  Productive in nature without production of sputum  Uses Spiriva inhaler and Robitussin at home for sx's  R/p chest x-ray shows increased congestion, persistent pleural effusion that is worsening on chest x-rays  Cough component likely CHF exacerbation  Pulmonology was consulted, recommended no procedural interventions at this time, but recommended   -Continue XOPENEX inhalation solution 1.25 mg   -Continue home inhalers and nebulizer treatment on discharge.   -Possible home oxygen evaluation     Plan:  Atrovent neb four times daily  XOPENEX inhalation solution 1.25 mg   Continue home inhalers and nebulizer treatment on discharge.   Possible home oxygen evaluation   Appreciate pulmonary's reccs regarding worsening pleural effusion.        Paroxysmal A-fib (HCC)  Assessment & Plan  Afib-  S/p ablation in 03/2021.   EKG- AV dual-paced rhythm, Vent. rate has decreased by 14 BPM since prior EKG  Low voltage QRS    Plan:  Eliquis 5 mg twice daily  Continue flecainide 150 mg twice daily and metoprolol succinate 75 mg twice daily  Continue diltiazem 120 mg  daily             VTE Pharmacologic Prophylaxis: VTE Score: 5 High Risk (Score >/= 5) - Pharmacological DVT Prophylaxis Ordered: apixaban (Eliquis). Sequential Compression Devices Ordered.    Mobility:   Basic Mobility Inpatient Raw Score: 23  JH-HLM Goal: 7: Walk 25 feet or more  JH-HLM Achieved: 7: Walk 25 feet or more  JH-HLM Goal achieved. Continue to encourage appropriate mobility.    Patient Centered Rounds: I performed bedside rounds with nursing staff today.   Discussions with Specialists or Other Care Team Provider: Cardiology, pulmonology    Education and Discussions with Family / Patient: Updated  (daughter) via phone.    Total Time Spent on Date of Encounter in care of patient: 60 mins. This time was spent on one or more of the following: performing physical exam; counseling and coordination of care; obtaining or reviewing history; documenting in the medical record; reviewing/ordering tests, medications or procedures; communicating with other healthcare professionals and discussing with patient's family/caregivers.    Current Length of Stay: 5 day(s)  Current Patient Status: Inpatient   Certification Statement: The patient will continue to require additional inpatient hospital stay due to acute on chronic congestive heart failure, pleural effusion  Discharge Plan: Anticipate discharge in 48-72 hrs to rehab facility.    Code Status: Level 3 - DNAR and DNI    Subjective:   Evaluate patient at bedside, she appears to be resting comfortably in bed and in no apparent distress.  She still presents with productive cough, but she notes that it is less intense.  She denies any shortness of breath, fatigue, lethargy, chest pain or palpitations.  She is ambulating appropriately, tolerating p.o. and voiding appropriately she understands a plan of care going forward, all questions and concerns were answered at bedside.    Objective:     Vitals:   Temp (24hrs), Av.1 °F (36.7 °C), Min:97.7 °F (36.5  °C), Max:98.4 °F (36.9 °C)    Temp:  [97.7 °F (36.5 °C)-98.4 °F (36.9 °C)] 97.7 °F (36.5 °C)  HR:  [60-64] 61  BP: (109-147)/(65-88) 128/83  SpO2:  [90 %-97 %] 95 %  Body mass index is 29.44 kg/m².     Input and Output Summary (last 24 hours):     Intake/Output Summary (Last 24 hours) at 8/11/2024 1050  Last data filed at 8/11/2024 0900  Gross per 24 hour   Intake 440 ml   Output 1600 ml   Net -1160 ml     Physical Exam  Vitals and nursing note reviewed.   Constitutional:       General: She is not in acute distress.     Appearance: She is well-developed.   HENT:      Head: Normocephalic and atraumatic.   Eyes:      Conjunctiva/sclera: Conjunctivae normal.   Cardiovascular:      Rate and Rhythm: Normal rate and regular rhythm.      Heart sounds: No murmur heard.  Pulmonary:      Effort: Pulmonary effort is normal. No respiratory distress.      Breath sounds: decreased R breath sounds in lower lobe   Abdominal:      Palpations: Abdomen is soft.      Tenderness: There is no abdominal tenderness.   Musculoskeletal:         General: No swelling.      Cervical back: Neck supple.      Right lower leg: Edema (1+) present.      Left lower leg: Edema (1+) present.   Skin:     General: Skin is warm and dry.      Capillary Refill: Capillary refill takes less than 2 seconds.   Neurological:      Mental Status: She is alert.   Psychiatric:         Mood and Affect: Mood normal.        Additional Data:     Labs:  Results from last 7 days   Lab Units 08/11/24  0605   WBC Thousand/uL 5.64   HEMOGLOBIN g/dL 11.0*   HEMATOCRIT % 34.5*   PLATELETS Thousands/uL 237   SEGS PCT % 58   LYMPHO PCT % 26   MONO PCT % 12   EOS PCT % 3     Results from last 7 days   Lab Units 08/11/24  0605 08/07/24  0528 08/06/24  1405   SODIUM mmol/L 135   < > 135   POTASSIUM mmol/L 4.0   < > 4.3   CHLORIDE mmol/L 98   < > 98   CO2 mmol/L 27   < > 29   BUN mg/dL 38*   < > 31*   CREATININE mg/dL 1.35*   < > 1.38*   ANION GAP mmol/L 10   < > 8   CALCIUM mg/dL  9.0   < > 9.0   ALBUMIN g/dL  --   --  3.7   TOTAL BILIRUBIN mg/dL  --   --  0.87   ALK PHOS U/L  --   --  70   ALT U/L  --   --  39   AST U/L  --   --  36   GLUCOSE RANDOM mg/dL 122   < > 138    < > = values in this interval not displayed.         Results from last 7 days   Lab Units 08/11/24  0750 08/10/24  2125 08/10/24  1159 08/10/24  0728 08/09/24  2046 08/09/24  1601 08/09/24  1155 08/09/24  0744 08/08/24  2155 08/08/24  1659 08/08/24  1126 08/08/24  0730   POC GLUCOSE mg/dl 115 160* 120 74 120 164* 166* 62* 166* 163* 122 96               Lines/Drains:  Invasive Devices       Peripheral Intravenous Line  Duration             Peripheral IV 08/06/24 Left Antecubital 4 days                  Imaging: Reviewed radiology reports from this admission including: chest xray    Recent Cultures (last 7 days):         Last 24 Hours Medication List:   Current Facility-Administered Medications   Medication Dose Route Frequency Provider Last Rate    acetaminophen  650 mg Oral Q8H PRN Steve Tripathi MD      albuterol  2 puff Inhalation Q6H PRN Steve Tripathi MD      apixaban  5 mg Oral BID Steve Tripathi MD      ascorbic acid  500 mg Oral Daily Steve Tripathi MD      benzonatate  100 mg Oral TID PRN Steve Tripathi MD      bumetanide  2 mg Oral Daily JENNY Hou      Cholecalciferol  1,000 Units Oral Daily Steve Tripathi MD      dextromethorphan-guaiFENesin  10 mL Oral Q4H PRN Odin Castro MD      diltiazem  120 mg Oral Daily Steve Tripathi MD      ezetimibe  10 mg Oral HS Steve Tripathi MD      famotidine  20 mg Oral BID Steve Tripathi MD      flecainide  150 mg Oral BID Steve Tripathi MD      gabapentin  300 mg Oral HS Steve Tripathi MD      insulin glargine  10 Units Subcutaneous HS Steve Tripathi MD      insulin lispro  1-5 Units Subcutaneous TID AC Steve Tripathi MD      levalbuterol  1.25 mg Nebulization TID MD becca Alfredodine  10 mg Oral Daily Steve Tripathi MD      metoprolol  succinate  75 mg Oral Q12H Steve Tripathi MD      potassium chloride  20 mEq Oral BID JENNY Hou      rOPINIRole  2 mg Oral HS Steve Tripathi MD      zolpidem  10 mg Oral HS Steve Tripathi MD          Today, Patient Was Seen By: Steve Tripathi MD    **Please Note: This note may have been constructed using a voice recognition system.**

## 2024-08-11 NOTE — ASSESSMENT & PLAN NOTE
Lab Results   Component Value Date    LVEF 50 07/14/2024    BNP 1,091 (H) 08/06/2024    BNP 1,428 (H) 07/24/2024     Acute and chronic respiratory failure with hypoxia 2/2 CHF exacerbation a/e/b hypoxia, increased oxygen demands requiring CXR, Nebs, PO bumex and 4L NC oxygen.    Presents with increased shortness of breath, dyspnea on exertion, lower extremity edema, and cough with productive sputum  Patient is currently volume overloaded.  3 to 4-day history of progressively worsening dyspnea, dry cough, worsening lower extremity edema.  She required increased oxygen when EMS arrived. difficulty talking normal sentences secondary to shortness of breath.  She is now unable to lie flat.  She was discharged on recent admission with 10 mg torsemide daily  Findings: ED provider: She is at baseline on 2 L O2 supplemental O2 and, because of hypoxia to the 80s, was bumped up to 4.  On arrival 97% on 4L (baseline chronic 2L NC)  Signs and symptoms indicative of acute CHF exacerbation secondary to under diuresis.  Patient was initially on 2 L of oxygen, presents with increased work of breathing, dry cough, + hepatojular reflux, appeared volume overloaded.  Currently pt is on room air saturating appopriately for her, diuresed approximately 3.1L of fluid since admission, improving SOB   Was seen by heart failure team 8/12, no recommendations were given    CXR 8/9/24: shows stable pleural effusion   Most recent Echo 50% LVEF     Weight (last 2 days)       Date/Time Weight    08/11/24 0600 73 (160.94)    08/10/24 0500 73.2 (161.38)          I/O: - 3.1 L since admission    Plan:  Per cardiology: PO Bumex to 2 mg BID.   kdur 20 meq BID. , B-Blocker: Toprol  Received an additional 1 mg dose of IV Bumex on 8/9/24   P.O. Bumex 2mg BID  Cardiac diet, sodium restriction  CMP, magnesium a.m; Goal Mg > 2 and K > 4; Replete prn  Daily standing weights, Measure I/O  Discuss pleural effusions with pulmonology

## 2024-08-12 ENCOUNTER — APPOINTMENT (INPATIENT)
Dept: RADIOLOGY | Facility: HOSPITAL | Age: 79
DRG: 291 | End: 2024-08-12
Payer: MEDICARE

## 2024-08-12 LAB
ANION GAP SERPL CALCULATED.3IONS-SCNC: 9 MMOL/L (ref 4–13)
BASOPHILS # BLD AUTO: 0.06 THOUSANDS/ÂΜL (ref 0–0.1)
BASOPHILS NFR BLD AUTO: 1 % (ref 0–1)
BUN SERPL-MCNC: 39 MG/DL (ref 5–25)
CALCIUM SERPL-MCNC: 9.2 MG/DL (ref 8.4–10.2)
CHLORIDE SERPL-SCNC: 98 MMOL/L (ref 96–108)
CO2 SERPL-SCNC: 28 MMOL/L (ref 21–32)
CREAT SERPL-MCNC: 1.44 MG/DL (ref 0.6–1.3)
EOSINOPHIL # BLD AUTO: 0.1 THOUSAND/ÂΜL (ref 0–0.61)
EOSINOPHIL NFR BLD AUTO: 1 % (ref 0–6)
ERYTHROCYTE [DISTWIDTH] IN BLOOD BY AUTOMATED COUNT: 14.6 % (ref 11.6–15.1)
GFR SERPL CREATININE-BSD FRML MDRD: 34 ML/MIN/1.73SQ M
GLUCOSE SERPL-MCNC: 111 MG/DL (ref 65–140)
GLUCOSE SERPL-MCNC: 133 MG/DL (ref 65–140)
GLUCOSE SERPL-MCNC: 149 MG/DL (ref 65–140)
GLUCOSE SERPL-MCNC: 189 MG/DL (ref 65–140)
GLUCOSE SERPL-MCNC: 219 MG/DL (ref 65–140)
GLUCOSE SERPL-MCNC: 251 MG/DL (ref 65–140)
HCT VFR BLD AUTO: 37.3 % (ref 34.8–46.1)
HGB BLD-MCNC: 11.8 G/DL (ref 11.5–15.4)
IMM GRANULOCYTES # BLD AUTO: 0.02 THOUSAND/UL (ref 0–0.2)
IMM GRANULOCYTES NFR BLD AUTO: 0 % (ref 0–2)
LYMPHOCYTES # BLD AUTO: 1.3 THOUSANDS/ÂΜL (ref 0.6–4.47)
LYMPHOCYTES NFR BLD AUTO: 17 % (ref 14–44)
MCH RBC QN AUTO: 28.5 PG (ref 26.8–34.3)
MCHC RBC AUTO-ENTMCNC: 31.6 G/DL (ref 31.4–37.4)
MCV RBC AUTO: 90 FL (ref 82–98)
MONOCYTES # BLD AUTO: 0.83 THOUSAND/ÂΜL (ref 0.17–1.22)
MONOCYTES NFR BLD AUTO: 11 % (ref 4–12)
NEUTROPHILS # BLD AUTO: 5.22 THOUSANDS/ÂΜL (ref 1.85–7.62)
NEUTS SEG NFR BLD AUTO: 70 % (ref 43–75)
NRBC BLD AUTO-RTO: 0 /100 WBCS
PLATELET # BLD AUTO: 242 THOUSANDS/UL (ref 149–390)
PMV BLD AUTO: 10.7 FL (ref 8.9–12.7)
POTASSIUM SERPL-SCNC: 4.7 MMOL/L (ref 3.5–5.3)
RBC # BLD AUTO: 4.14 MILLION/UL (ref 3.81–5.12)
SODIUM SERPL-SCNC: 135 MMOL/L (ref 135–147)
WBC # BLD AUTO: 7.53 THOUSAND/UL (ref 4.31–10.16)

## 2024-08-12 PROCEDURE — 99232 SBSQ HOSP IP/OBS MODERATE 35: CPT | Performed by: INTERNAL MEDICINE

## 2024-08-12 PROCEDURE — 85025 COMPLETE CBC W/AUTO DIFF WBC: CPT

## 2024-08-12 PROCEDURE — 99222 1ST HOSP IP/OBS MODERATE 55: CPT | Performed by: STUDENT IN AN ORGANIZED HEALTH CARE EDUCATION/TRAINING PROGRAM

## 2024-08-12 PROCEDURE — 94640 AIRWAY INHALATION TREATMENT: CPT

## 2024-08-12 PROCEDURE — 80048 BASIC METABOLIC PNL TOTAL CA: CPT

## 2024-08-12 PROCEDURE — 94760 N-INVAS EAR/PLS OXIMETRY 1: CPT

## 2024-08-12 PROCEDURE — 82948 REAGENT STRIP/BLOOD GLUCOSE: CPT

## 2024-08-12 PROCEDURE — 71045 X-RAY EXAM CHEST 1 VIEW: CPT

## 2024-08-12 RX ADMIN — FLECAINIDE ACETATE 150 MG: 50 TABLET ORAL at 17:21

## 2024-08-12 RX ADMIN — LEVALBUTEROL HYDROCHLORIDE 1.25 MG: 1.25 SOLUTION RESPIRATORY (INHALATION) at 07:49

## 2024-08-12 RX ADMIN — BUMETANIDE 2 MG: 1 TABLET ORAL at 09:15

## 2024-08-12 RX ADMIN — GUAIFENESIN AND DEXTROMETHORPHAN 10 ML: 100; 10 SYRUP ORAL at 09:15

## 2024-08-12 RX ADMIN — Medication 1000 UNITS: at 09:15

## 2024-08-12 RX ADMIN — FLECAINIDE ACETATE 150 MG: 50 TABLET ORAL at 09:15

## 2024-08-12 RX ADMIN — FAMOTIDINE 20 MG: 20 TABLET, FILM COATED ORAL at 17:21

## 2024-08-12 RX ADMIN — APIXABAN 5 MG: 5 TABLET, FILM COATED ORAL at 17:22

## 2024-08-12 RX ADMIN — POTASSIUM CHLORIDE 20 MEQ: 1500 TABLET, EXTENDED RELEASE ORAL at 17:21

## 2024-08-12 RX ADMIN — BENZONATATE 100 MG: 100 CAPSULE ORAL at 17:28

## 2024-08-12 RX ADMIN — DILTIAZEM HYDROCHLORIDE 120 MG: 120 CAPSULE, COATED, EXTENDED RELEASE ORAL at 09:15

## 2024-08-12 RX ADMIN — ZOLPIDEM TARTRATE 10 MG: 5 TABLET, COATED ORAL at 21:37

## 2024-08-12 RX ADMIN — METOPROLOL SUCCINATE 75 MG: 50 TABLET, EXTENDED RELEASE ORAL at 17:21

## 2024-08-12 RX ADMIN — BENZONATATE 100 MG: 100 CAPSULE ORAL at 09:15

## 2024-08-12 RX ADMIN — LEVALBUTEROL HYDROCHLORIDE 1.25 MG: 1.25 SOLUTION RESPIRATORY (INHALATION) at 14:04

## 2024-08-12 RX ADMIN — LEVALBUTEROL HYDROCHLORIDE 1.25 MG: 1.25 SOLUTION RESPIRATORY (INHALATION) at 19:27

## 2024-08-12 RX ADMIN — GUAIFENESIN AND DEXTROMETHORPHAN 10 ML: 100; 10 SYRUP ORAL at 17:28

## 2024-08-12 RX ADMIN — FAMOTIDINE 20 MG: 20 TABLET, FILM COATED ORAL at 09:15

## 2024-08-12 RX ADMIN — METOPROLOL SUCCINATE 75 MG: 50 TABLET, EXTENDED RELEASE ORAL at 05:13

## 2024-08-12 RX ADMIN — INSULIN GLARGINE 10 UNITS: 100 INJECTION, SOLUTION SUBCUTANEOUS at 21:38

## 2024-08-12 RX ADMIN — GABAPENTIN 300 MG: 300 CAPSULE ORAL at 21:37

## 2024-08-12 RX ADMIN — APIXABAN 5 MG: 5 TABLET, FILM COATED ORAL at 09:15

## 2024-08-12 RX ADMIN — EZETIMIBE 10 MG: 10 TABLET ORAL at 21:37

## 2024-08-12 RX ADMIN — OXYCODONE HYDROCHLORIDE AND ACETAMINOPHEN 500 MG: 500 TABLET ORAL at 09:15

## 2024-08-12 RX ADMIN — ROPINIROLE HYDROCHLORIDE 2 MG: 1 TABLET, FILM COATED ORAL at 21:37

## 2024-08-12 RX ADMIN — POTASSIUM CHLORIDE 20 MEQ: 1500 TABLET, EXTENDED RELEASE ORAL at 09:15

## 2024-08-12 NOTE — CONSULTS
Advanced Heart Failure/Pulmonary Hypertension Inpatient Note - Farnaz Martin 79 y.o. female MRN: [id number]    Unit/Bed#: S -01 Encounter: [id number]    Assessment:  79 y.o. female PMH and acute problems listed later in this note (a partial list may also be included within 'assessment' section) p/w sob.    The patient's primary cardiac team is general cardiology, follows Dr. Cunningham  Bronchiectasis  Pl effusions, recurrent  HFpEF  UTI, rare, most recent around 1/2024  AF/AFL  HTN  History of torsades susan pointes: in setting of QT-prolongation with sotalol s/p DCCV during ablation.  Renal mass, left  History of sick sinus syndrome: s/p DC PPM in 03/2021.  History of thyroid cancer      Today I have reviewed all pertinent labs/imaging/data including but not limited to:    Temp:  [97.6 °F (36.4 °C)-98.7 °F (37.1 °C)] 97.9 °F (36.6 °C)  HR:  [60-69] 62  Resp:  [16-20] 16  BP: ()/(56-84) 138/84  SpO2:  [88 %-96 %] 91 %   Weight (last 2 days)       Date/Time Weight    08/12/24 0600 77.8 (171.52)    08/11/24 0600 73 (160.94)    08/10/24 0500 73.2 (161.38)             Intake/Output Summary (Last 24 hours) at 8/12/2024 1010  Last data filed at 8/12/2024 0415  Gross per 24 hour   Intake 100 ml   Output 450 ml   Net -350 ml       Results from last 7 days   Lab Units 08/12/24  0620 08/11/24  0605 08/10/24  0333   CREATININE mg/dL 1.44* 1.35* 1.24     Lab Results   Component Value Date    K 4.7 08/12/2024     Lab Results   Component Value Date    HGBA1C 8.8 (H) 07/13/2024     Lab Results   Component Value Date    SRR0LARQSRTN 3.416 07/26/2024     Lab Results   Component Value Date    LDLCALC 40 07/25/2024     Lab Results   Component Value Date    BNP 1,091 (H) 08/06/2024      Lab Results   Component Value Date    NTBNP 761 (H) 01/08/2021                 Drips:        Plan:  I am meeting patient for the first time today 8/12/24  HF cx for help with vol status, has had dehydration in past as well per chart  Warm,  euvolemic by exam  Weight may be slightly above BL  Cr may be slightly above BL vs new nl    Prior low dose home torsemide 10 qd already increased to bumex 2 qd per general cardiology > agree with this change    We discussed jardiance inpt vs future as outpt  Pt open to this  Note 1/2024 UTI - hold sglt2i for now - would price check and consider pending course as outpt, especially given newly increased maintenance diuretic from prior low dose    D-PPM  Note Heavy v pacing  Note 2%AF and AFL on recent interrogation    Monitor appropriate eliquis dosing, ok for now    Fu pulm for bronchiectasis and pl effusions  Per chart she declined pleurx    Consider nephrology consult  Note renal mass per Hx    The advanced heart failure team will signoff at this time  Fu general cardiology team for further recommendations  Staff has been contacted for post DC cardiology fu.  Thank you for the opportunity to care for this patient    Studies:  Today I have reviewed all pertinent patient data/labs/imaging where available, including but not limited to the below studies. This includes my independent interpretation. Selected results may be displayed here but comprehensive listing is omitted for note clarity and can be found in the epic chart.    ECG.    Echo.    Stress.    Cath.    HPI:   79 y.o. female PMH and acute problems listed later in this note (a partial list may also be included within 'assessment' section) p/w sob.  No new CP/dizziness/palpitations/syncope.  No new unintentional weight changes.  No new leg swelling, PND, pillow orthopnea.  No new fevers, chills, cough, nausea, vomiting, diarrhea, dysuria.      ROS:  10 point ROS negative except as specified in HPI    Past Medical History:   Diagnosis Date    Actinic keratosis     last assessed - 06Jun2014    Arthritis     Atrial fibrillation with rapid ventricular response (HCC)     last assessed - 26Apr2016    Atrial fibrillation with rapid ventricular response (HCC)  04/19/2016    Basal cell carcinoma     Benign colon polyp     last assessed - [id number]    Bronchiectasis without complication (HCC)     CHF (congestive heart failure) (HCC) 06/2022    Chronic diastolic CHF (congestive heart failure) (HCC)     Disease of thyroid gland     Effusion of knee joint right     Resolved - [id number]    Esophageal reflux     Fibromyalgia     last assessed - [id number]    Fibromyalgia, primary     GERD (gastroesophageal reflux disease)     Hyperlipidemia     Hypertension     Hyponatremia     Malignant neoplasm of thyroid gland (HCC)     Meningioma (HCC)     MGUS (monoclonal gammopathy of unknown significance)     Palpitations     last assessed - [id number]    Peroneal tendonitis, right     last assessed - [id number]    Right lumbar radiculopathy 03/17/2016    Thyroid cancer (HCC)     Thyroid nodule     Trochanteric bursitis of left hip 03/09/2018     Patient Active Problem List   Diagnosis    Essential hypertension    Allergic rhinitis    Fibromyalgia    Hyperlipidemia    Insomnia    Lumbar spondylosis    Lumbar stenosis    Osteoarthrosis, hand    Osteoarthritis of knee    Osteopenia    Peripheral neuropathy    Restless legs syndrome    TMJ syndrome    Vitamin D deficiency    Osteoarthritis of shoulder    Carpal tunnel syndrome on right    Arthritis of both acromioclavicular joints    Chronic rhinitis    Goals of care, counseling/discussion    Malignant neoplasm of thyroid gland (HCC)    Current use of long term anticoagulation    S/P placement of cardiac pacemaker    Tremors of nervous system    Hx of diplopia    Hurthle cell adenoma of thyroid    MGUS (monoclonal gammopathy of unknown significance)    Vasomotor rhinitis    Chronic tension-type headache, not intractable    Meningioma (HCC)    Acute on chronic heart failure (HCC)    Elevated serum creatinine    Nonrheumatic mitral valve regurgitation    Abnormal CT of the chest    S/P revision of total knee, left    Multiple thyroid nodules     Cough    Bronchiectasis without complication (HCC)    Left renal mass    Ambulatory dysfunction    Hyponatremia    Chronic heart failure with preserved ejection fraction (HCC)    Paroxysmal A-fib (HCC)    History of sick sinus syndrome s/p PPM    Type 2 diabetes mellitus with microalbuminuria, without long-term current use of insulin ([facility])    Syncope and collapse    Fall    Recurrent pleural effusion on right    Acute kidney injury superimposed on CKD (chronic kidney disease) stage 3, GFR 30-59 ml/min ([facility])    Fatigue    Thyroid nodule    Shortness of breath        Current Facility-Administered Medications   Medication Dose Route Frequency Provider Last Rate    acetaminophen  650 mg Oral Q8H PRN Steve Tripathi MD      albuterol  2 puff Inhalation Q6H PRN Steve Tripathi MD      apixaban  5 mg Oral BID Steve Tripathi MD      ascorbic acid  500 mg Oral Daily Steve Tripathi MD      benzonatate  100 mg Oral TID PRN Steve Tripathi MD      bumetanide  2 mg Oral Daily JENNY Hou      Cholecalciferol  1,000 Units Oral Daily Steve Tripathi MD      dextromethorphan-guaiFENesin  10 mL Oral Q4H PRN Odin Castro MD      diltiazem  120 mg Oral Daily Steve Tripathi MD      ezetimibe  10 mg Oral HS Steve Tripathi MD      famotidine  20 mg Oral BID Steve Tripathi MD      flecainide  150 mg Oral BID Steve Tripathi MD      gabapentin  300 mg Oral HS Steve Tripathi MD      insulin glargine  10 Units Subcutaneous HS Steve Tripathi MD      insulin lispro  1-5 Units Subcutaneous TID AC Steve Tripathi MD      levalbuterol  1.25 mg Nebulization TID Fauzia Pennington MD      loratadine  10 mg Oral Daily Steve Tripathi MD      metoprolol succinate  75 mg Oral Q12H Steve Tripathi MD      potassium chloride  20 mEq Oral BID JENNY Hou      rOPINIRole  2 mg Oral HS Steve Tripathi MD      zolpidem  10 mg Oral HS Steve Tripathi MD       Allergies   Allergen Reactions    Sotalol Other (See Comments)     Prolonged  QTc leading to torsades de pointes     Penicillins Other (See Comments)     As a child calcium deposit in the arm     Ace Inhibitors GI Intolerance     Did feel well on it    Omeprazole Abdominal Pain, Rash and Vomiting     stomach pain, vomiting, rash     Social History     Socioeconomic History    Marital status:      Spouse name: Not on file    Number of children: Not on file    Years of education: Not on file    Highest education level: Not on file   Occupational History    Not on file   Tobacco Use    Smoking status: Never    Smokeless tobacco: Never   Vaping Use    Vaping status: Never Used   Substance and Sexual Activity    Alcohol use: Not Currently    Drug use: Never    Sexual activity: Not Currently   Other Topics Concern    Not on file   Social History Narrative    ** Merged History Encounter **          Social Determinants of Health     Financial Resource Strain: Patient Unable To Answer (12/19/2023)    Overall Financial Resource Strain (CARDIA)     Difficulty of Paying Living Expenses: Patient unable to answer   Food Insecurity: No Food Insecurity (7/25/2024)    Hunger Vital Sign     Worried About Running Out of Food in the Last Year: Never true     Ran Out of Food in the Last Year: Never true   Transportation Needs: No Transportation Needs (7/25/2024)    PRAPARE - Transportation     Lack of Transportation (Medical): No     Lack of Transportation (Non-Medical): No   Physical Activity: Not on file   Stress: Not on file   Social Connections: Not on file   Intimate Partner Violence: Not At Risk (7/31/2023)    [facility]    Humiliation, Afraid, Rape, and Kick questionnaire     Fear of Current or Ex-Partner: No     Emotionally Abused: No     Physically Abused: No     Sexually Abused: No   Housing Stability: Low Risk  (7/25/2024)    Housing Stability Vital Sign     Unable to Pay for Housing in the Last Year: No     Number of Times Moved in  the Last Year: 0     Homeless in the Last Year: No     Family History   Problem Relation Age of Onset    Heart disease Mother     Diabetes Mother     Heart disease Father     Coronary artery disease Father     Stroke Father         cerebrovascular accident    Heart attack Father         myocardial infarction    Sudden death Father         scd    Other Family         Back disorder    Coronary artery disease Family     Neuropathy Family     Osteoporosis Family     No Known Problems Daughter     No Known Problems Maternal Grandmother     No Known Problems Maternal Grandfather     No Known Problems Paternal Grandmother     No Known Problems Paternal Grandfather     Cancer Maternal Uncle     Breast cancer Maternal Aunt 65    No Known Problems Son     No Known Problems Maternal Aunt     No Known Problems Maternal Aunt     No Known Problems Maternal Aunt     No Known Problems Paternal Aunt     No Known Problems Paternal Aunt     Anuerysm Neg Hx     Clotting disorder Neg Hx     Arrhythmia Neg Hx     Heart failure Neg Hx        Tele: reviewed    Objective:     Physical Exam:  Temp:  [97.6 °F (36.4 °C)-98.7 °F (37.1 °C)] 97.9 °F (36.6 °C)  HR:  [60-69] 62  Resp:  [16-20] 16  BP: ()/(56-84) 138/84    Weight (last 2 days)       Date/Time Weight    08/12/24 0600 77.8 (171.52)    08/11/24 0600 73 (160.94)    08/10/24 0500 73.2 (161.38)             Intake/Output Summary (Last 24 hours) at 8/12/2024 1010  Last data filed at 8/12/2024 0415  Gross per 24 hour   Intake 100 ml   Output 450 ml   Net -350 ml     Constitutional: NAD, non toxic  Ears/nose/mouth/throat: atraumatic  CV: RRR, no JVD  Resp: Decreased breath sounds BL  GI: Soft, NTND  MSK: no swollen joints in exposed areas  Extr: No edema, warm LE  Pysche: Normal affect  Neuro: appropriate in conversation  Skin: dry and intact in exposed areas     Data:   Results from last 7 days   Lab Units 08/12/24  0620 08/11/24  0605 08/10/24  0333 08/09/24  0630 08/08/24  0546  08/07/24  0528 08/06/24  1405   WBC Thousand/uL 7.53 5.64 5.55 5.72 6.23 5.70 5.89   HEMOGLOBIN g/dL 11.8 11.0* 10.6* 10.5* 11.1* 10.7* 11.5   HEMATOCRIT % 37.3 34.5* 33.5* 33.0* 34.7* 33.3* 36.3   PLATELETS Thousands/uL 242 237 208 225 255 238 267   SEGS PCT % 70 58 48 52 52 56 67   MONO PCT % 11 12 15* 14* 13* 13* 10   EOS PCT % 1 3 7* 5 5 4 3      Results from last 7 days   Lab Units 08/12/24  0620 08/11/24  0605 08/10/24  0333 08/09/24  0630 08/08/24  0546 08/07/24  0528 08/06/24  1405   SODIUM mmol/L 135 135 137 136 136 136 135   POTASSIUM mmol/L 4.7 4.0 3.6 3.3* 3.6 3.5 4.3   CHLORIDE mmol/L 98 98 100 98 98 98 98   CO2 mmol/L 28 27 28 30 29 30 29   ANION GAP mmol/L 9 10 9 8 9 8 8   BUN mg/dL 39* 38* 34* 36* 35* 31* 31*   CREATININE mg/dL 1.44* 1.35* 1.24 1.38* 1.39* 1.32* 1.38*   CALCIUM mg/dL 9.2 9.0 8.9 8.8 9.0 8.8 9.0   GLUCOSE RANDOM mg/dL 149* 122 76 81 98 137 138   ALT U/L  --   --   --   --   --   --  39   AST U/L  --   --   --   --   --   --  36   ALK PHOS U/L  --   --   --   --   --   --  70   ALBUMIN g/dL  --   --   --   --   --   --  3.7   TOTAL BILIRUBIN mg/dL  --   --   --   --   --   --  0.87      Lab Results   Component Value Date     07/15/2024       Counseling / Coordination of Care:  Greater than 50% of total time was spent with the patient and / or family counseling and / or coordination of care.  A description of the counseling / coordination of care: we discussed diagnoses, recent as well as older studies, labs, and all changes in cardiac treatment plan. All patient questions were answered.     Thank you for the opportunity to participate in the care of this patient.    Terence Mclean MD  Attending Physician  Advanced Heart Failure and Transplant Cardiology  [facility]

## 2024-08-12 NOTE — PROGRESS NOTES
Progress Note - Pulmonary   Farnaz Martin 79 y.o. female MRN: [id number]  Unit/Bed#: S -01 Encounter: [id number]    Assessment/Plan:    Recurrent right pleural effusion: Appears stable on chest x-ray.  On a previous admission, patient had a thoracentesis of 700 mL of transudative fluid attributed to CHF exacerbation.  Currently she is declining Pleurx catheter.  She is agreeable to thoracentesis prior to discharge.  Currently on p.o. Bumex 2 mg daily.  Not requiring any oxygen and reports improvement of symptoms since she has been diuresed in the hospital on this admission.  Continue p.o. diuretic with Bumex 2 mg daily  Monitor for worsening symptoms or increased oxygen requirements  We will aim for IR thoracentesis tomorrow versus thoracentesis by pulm team tomorrow  Will defer Pleurx catheter at this time.    Acute on chronic respiratory failure: Patient presenting with worsening SOB requiring oxygen.  Currently she is off oxygen, saturating 95% on room air.  Has been diuresed in the hospital.  Recurrent right pleural effusion appears stable.  Recommend ambulatory pulse ox prior to discharge    Farnaz Martin is an 79-year-old female with a past medical history of heart failure with preserved ejection fraction, recurrent right-sided pleural effusion with prior thoracentesis of 700 mL transudative fluid last month, paroxysmal A-fib flutter status post ablation, who presents on this admission with worsened dyspnea, dry cough and weight gain of 9 pounds prompting an admission for acute on chronic heart failure.  During this admission, patient was diuresed and transition to p.o. Bumex.  Last admission she was discharged home with p.o. torsemide and she states that she did not miss doses or deviate from cardiac diet.  We were consulted to evaluate the need for thoracentesis and patient does not wish to undergo thoracentesis or Pleurx catheter insertion at this time.  Cardiology transitioned her to p.o.  "Bumex.    Subjective:    Patient seen and evaluated by me at the bedside today.  She states that she feels \"good\", denies any shortness of breath at rest or on exertion.  She is found resting comfortably on her chair, saturating well.  She states that she still continues to have a cough productive of clear to yellow sputum, she states that the cough overall is getting better.  She denies any fevers or chills.    Discussed with her the options of getting a thoracentesis, Pleurx catheter, versus diuresis alone for the pleural effusion.  Patient elects to continue with diuresis for now and postpone any intervention for the pleural effusion, which we think is reasonable.  Patient is to be started on Bumex 2 mg    Objective:    Vitals: Blood pressure 138/84, pulse 62, temperature 97.9 °F (36.6 °C), resp. rate 16, weight 77.8 kg (171 lb 8.3 oz), last menstrual period 02/01/1990, SpO2 91%, not currently breastfeeding.,Body mass index is 31.37 kg/m².      Intake/Output Summary (Last 24 hours) at 8/12/2024 1315  Last data filed at 8/12/2024 0900  Gross per 24 hour   Intake 340 ml   Output 450 ml   Net -110 ml       Invasive Devices       Peripheral Intravenous Line  Duration             Peripheral IV 08/06/24 Left Antecubital 5 days                    Physical Exam  Constitutional:       General: She is not in acute distress.     Appearance: Normal appearance. She is not ill-appearing.   HENT:      Head: Normocephalic and atraumatic.      Mouth/Throat:      Mouth: Mucous membranes are moist.   Eyes:      General: No scleral icterus.     Extraocular Movements: Extraocular movements intact.      Pupils: Pupils are equal, round, and reactive to light.   Cardiovascular:      Rate and Rhythm: Normal rate and regular rhythm.      Pulses: Normal pulses.      Heart sounds: Normal heart sounds. No murmur heard.     No friction rub. No gallop.   Pulmonary:      Effort: Pulmonary effort is normal. No respiratory distress.      Breath " sounds: Rales (Right lung base) present. No wheezing.      Comments: Decreased breath sounds at right lung base  Abdominal:      General: There is no distension.      Palpations: Abdomen is soft.      Tenderness: There is no abdominal tenderness. There is no guarding.   Musculoskeletal:      Right lower leg: Edema (Trace) present.      Left lower leg: Edema (Trace) present.   Skin:     General: Skin is warm.      Capillary Refill: Capillary refill takes less than 2 seconds.   Neurological:      Mental Status: She is alert and oriented to person, place, and time.   Psychiatric:         Mood and Affect: Mood normal.         Behavior: Behavior normal.          Labs: I have personally reviewed pertinent lab results.    Imaging and other studies: I have personally reviewed pertinent reports.

## 2024-08-12 NOTE — PLAN OF CARE

## 2024-08-12 NOTE — PROGRESS NOTES
[facility]  Progress Note  Name: Farnaz Martin I  MRN: [id number]  Unit/Bed#: S -01 I Date of Admission: 8/6/2024   Date of Service: 8/12/2024 I Hospital Day: 6    Assessment & Plan   * Acute on chronic heart failure (HCC)  Assessment & Plan    Lab Results   Component Value Date    LVEF 50 07/14/2024    BNP 1,091 (H) 08/06/2024    BNP 1,428 (H) 07/24/2024     Acute and chronic respiratory failure with hypoxia 2/2 CHF exacerbation a/e/b hypoxia, increased oxygen demands requiring CXR, Nebs, PO bumex and 4L NC oxygen.    Presents with increased shortness of breath, dyspnea on exertion, lower extremity edema, and cough with productive sputum  Patient is currently volume overloaded.  3 to 4-day history of progressively worsening dyspnea, dry cough, worsening lower extremity edema.  She required increased oxygen when EMS arrived. difficulty talking normal sentences secondary to shortness of breath.  She is now unable to lie flat.  She was discharged on recent admission with 10 mg torsemide daily  Findings: ED provider: She is at baseline on 2 L O2 supplemental O2 and, because of hypoxia to the 80s, was bumped up to 4.  On arrival 97% on 4L (baseline chronic 2L NC)  Signs and symptoms indicative of acute CHF exacerbation secondary to under diuresis.  Patient was initially on 2 L of oxygen, presents with increased work of breathing, dry cough, + hepatojular reflux, appeared volume overloaded.  Currently pt is on room air saturating appopriately for her, diuresed approximately 3.1L of fluid since admission, improving SOB   Was seen by heart failure team 8/12, no recommendations were given    CXR 8/9/24: shows stable pleural effusion   Most recent Echo 50% LVEF     Weight (last 2 days)       Date/Time Weight    08/11/24 0600 73 (160.94)    08/10/24 0500 73.2 (161.38)          I/O: - 3.1 L since admission    Plan:  Per cardiology: PO Bumex to 2 mg BID.   kdur 20 meq BID. , B-Blocker:  Toprol  Received an additional 1 mg dose of IV Bumex on 8/9/24   P.O. Bumex 2mg BID  Cardiac diet, sodium restriction  CMP, magnesium a.m; Goal Mg > 2 and K > 4; Replete prn  Daily standing weights, Measure I/O  Discuss pleural effusions with pulmonology          Elevated serum creatinine  Assessment & Plan  Improved, currently at baseline  Recent Labs     08/09/24  0630 08/10/24  0333 08/11/24  0605   CREATININE 1.38* 1.24 1.35*   EGFR 36 41 37     Estimated Creatinine Clearance: 31.1 mL/min (A) (by C-G formula based on SCr of 1.35 mg/dL (H)).    POA 1.38; (baseline 1.1-1.28)  Likely secondary to fluid overload due to being under diuresed at home with 10 mg torsemide daily.    Plan:  PO Bumex 2mg twice daily  Monitor BMP, IV fluids held  Avoid hypoperfusion of the kidneys, minimize nephrotoxins  Currently at baseline, continue to monitor with daily labs    Cough  Assessment & Plan  Pt endorsing orthopnea, SOB  Productive in nature without production of sputum  Uses Spiriva inhaler and Robitussin at home for sx's  R/p chest x-ray shows increased congestion, persistent pleural effusion that is worsening on chest x-rays  Cough component likely CHF exacerbation  Pulmonology was consulted, recommended no procedural interventions at this time, but recommended   -Continue XOPENEX inhalation solution 1.25 mg   -Continue home inhalers and nebulizer treatment on discharge.   -Possible home oxygen evaluation     Plan:  Atrovent neb four times daily  XOPENEX inhalation solution 1.25 mg   Continue home inhalers and nebulizer treatment on discharge.   Possible home oxygen evaluation   Appreciate pulmonary's reccs regarding worsening pleural effusion.        Paroxysmal A-fib (HCC)  Assessment & Plan  Afib-  S/p ablation in 03/2021.   EKG- AV dual-paced rhythm, Vent. rate has decreased by 14 BPM since prior EKG  Low voltage QRS    Plan:  Eliquis 5 mg twice daily  Continue flecainide 150 mg twice daily and metoprolol succinate 75  mg twice daily  Continue diltiazem 120 mg daily           VTE Pharmacologic Prophylaxis: VTE Score: 5 High Risk (Score >/= 5) - Pharmacological DVT Prophylaxis Ordered: apixaban (Eliquis). Sequential Compression Devices Ordered.    Mobility:   Basic Mobility Inpatient Raw Score: 23  JH-HLM Goal: 7: Walk 25 feet or more  JH-HLM Achieved: 7: Walk 25 feet or more  JH-HLM Goal achieved. Continue to encourage appropriate mobility.    Patient Centered Rounds: I performed bedside rounds with nursing staff today.   Discussions with Specialists or Other Care Team Provider: Heart failure, cardiology, pulmonology    Education and Discussions with Family / Patient: Updated  (daughter) via phone.    Total Time Spent on Date of Encounter in care of patient: 60 mins. This time was spent on one or more of the following: performing physical exam; counseling and coordination of care; obtaining or reviewing history; documenting in the medical record; reviewing/ordering tests, medications or procedures; communicating with other healthcare professionals and discussing with patient's family/caregivers.    Current Length of Stay: 6 day(s)  Current Patient Status: Inpatient   Certification Statement: The patient will continue to require additional inpatient hospital stay due to acute on chronic congestive heart failure, pleural effusion  Discharge Plan: Anticipate discharge in 48-72 hrs to rehab facility.    Code Status: Level 3 - DNAR and DNI    Subjective:   Patient was evaluated at bedside, sleeping but arousable.  She denies minimal shortness of breath, continued cough but nonproductive.  Upon examination, patient was saturating appropriately between 94-96% O2 on room air.  She denies any chest pain or palpitations.  She understands the plan of care going forward, all questions and concerns were answered at bedside.    Objective:     Vitals:   Temp (24hrs), Av.1 °F (36.7 °C), Min:97.6 °F (36.4 °C), Max:98.7 °F (37.1  °C)    Temp:  [97.6 °F (36.4 °C)-98.7 °F (37.1 °C)] 97.9 °F (36.6 °C)  HR:  [60-69] 62  Resp:  [16-20] 16  BP: ()/(56-84) 138/84  SpO2:  [88 %-96 %] 91 %  Body mass index is 31.37 kg/m².     Input and Output Summary (last 24 hours):     Intake/Output Summary (Last 24 hours) at 8/12/2024 1113  Last data filed at 8/12/2024 0900  Gross per 24 hour   Intake 340 ml   Output 450 ml   Net -110 ml     Physical Exam  Vitals and nursing note reviewed.   Constitutional:       General: She is not in acute distress.     Appearance: She is well-developed.   HENT:      Head: Normocephalic and atraumatic.   Eyes:      Conjunctiva/sclera: Conjunctivae normal.   Cardiovascular:      Rate and Rhythm: Normal rate and regular rhythm.      Heart sounds: No murmur heard.  Pulmonary:      Effort: Pulmonary effort is normal. No respiratory distress.      Breath sounds: decreased R breath sounds in lower lobe   Abdominal:      Palpations: Abdomen is soft.      Tenderness: There is no abdominal tenderness.   Musculoskeletal:         General: No swelling.      Cervical back: Neck supple.      Right lower leg: Edema (1+) present.      Left lower leg: Edema (1+) present.   Skin:     General: Skin is warm and dry.      Capillary Refill: Capillary refill takes less than 2 seconds.   Neurological:      Mental Status: She is alert.   Psychiatric:         Mood and Affect: Mood normal.     Additional Data:     Labs:  Results from last 7 days   Lab Units 08/12/24  0620   WBC Thousand/uL 7.53   HEMOGLOBIN g/dL 11.8   HEMATOCRIT % 37.3   PLATELETS Thousands/uL 242   SEGS PCT % 70   LYMPHO PCT % 17   MONO PCT % 11   EOS PCT % 1     Results from last 7 days   Lab Units 08/12/24  0620 08/07/24  0528 08/06/24  1405   SODIUM mmol/L 135   < > 135   POTASSIUM mmol/L 4.7   < > 4.3   CHLORIDE mmol/L 98   < > 98   CO2 mmol/L 28   < > 29   BUN mg/dL 39*   < > 31*   CREATININE mg/dL 1.44*   < > 1.38*   ANION GAP mmol/L 9   < > 8   CALCIUM mg/dL 9.2   < > 9.0    ALBUMIN g/dL  --   --  3.7   TOTAL BILIRUBIN mg/dL  --   --  0.87   ALK PHOS U/L  --   --  70   ALT U/L  --   --  39   AST U/L  --   --  36   GLUCOSE RANDOM mg/dL 149*   < > 138    < > = values in this interval not displayed.         Results from last 7 days   Lab Units 08/12/24  1110 08/12/24  0751 08/11/24  2220 08/11/24  1549 08/11/24  1121 08/11/24  0750 08/10/24  2125 08/10/24  1159 08/10/24  0728 08/09/24  2046 08/09/24  1601 08/09/24  1155   POC GLUCOSE mg/dl 189* 133 251* 136 232* 115 160* 120 74 120 164* 166*               Lines/Drains:  Invasive Devices       Peripheral Intravenous Line  Duration             Peripheral IV 08/06/24 Left Antecubital 5 days                          Imaging: Reviewed radiology reports from this admission including: chest xray    Recent Cultures (last 7 days):         Last 24 Hours Medication List:   Current Facility-Administered Medications   Medication Dose Route Frequency Provider Last Rate    acetaminophen  650 mg Oral Q8H PRN Steve Tripathi MD      albuterol  2 puff Inhalation Q6H PRN Steve Tripathi MD      apixaban  5 mg Oral BID Steve Tripathi MD      ascorbic acid  500 mg Oral Daily Steve Tripathi MD      benzonatate  100 mg Oral TID PRN Steve Tripathi MD      bumetanide  2 mg Oral Daily JENNY Hou      Cholecalciferol  1,000 Units Oral Daily Steve Tripathi MD      dextromethorphan-guaiFENesin  10 mL Oral Q4H PRN Odin Castro MD      diltiazem  120 mg Oral Daily Steve Tripathi MD      ezetimibe  10 mg Oral HS Steve Tripathi MD      famotidine  20 mg Oral BID Steve Tripathi MD      flecainide  150 mg Oral BID Steve Tripathi MD      gabapentin  300 mg Oral HS Steve Tripathi MD      insulin glargine  10 Units Subcutaneous HS Steve Tripathi MD      insulin lispro  1-5 Units Subcutaneous TID AC Steve Tripathi MD      levalbuterol  1.25 mg Nebulization TID Fauzia Pennington MD      loratadine  10 mg Oral Daily Steve Tripathi MD      metoprolol succinate   75 mg Oral Q12H Steve Tripathi MD      potassium chloride  20 mEq Oral BID JENNY Hou      rOPINIRole  2 mg Oral HS Steve Tripathi MD      zolpidem  10 mg Oral HS Steve Tripathi MD          Today, Patient Was Seen By: Steve Tripathi MD    **Please Note: This note may have been constructed using a voice recognition system.**

## 2024-08-12 NOTE — ASSESSMENT & PLAN NOTE
Improved, currently at baseline  Recent Labs     08/09/24  0630 08/10/24  0333 08/11/24  0605   CREATININE 1.38* 1.24 1.35*   EGFR 36 41 37     Estimated Creatinine Clearance: 31.1 mL/min (A) (by C-G formula based on SCr of 1.35 mg/dL (H)).    POA 1.38; (baseline 1.1-1.28)  Likely secondary to fluid overload due to being under diuresed at home with 10 mg torsemide daily.    Plan:  PO Bumex 2mg twice daily  Monitor BMP, IV fluids held  Avoid hypoperfusion of the kidneys, minimize nephrotoxins  Currently at baseline, continue to monitor with daily labs

## 2024-08-13 ENCOUNTER — APPOINTMENT (INPATIENT)
Dept: RADIOLOGY | Facility: HOSPITAL | Age: 79
DRG: 291 | End: 2024-08-13
Payer: MEDICARE

## 2024-08-13 PROBLEM — Z51.5 PALLIATIVE CARE PATIENT: Status: ACTIVE | Noted: 2024-08-13

## 2024-08-13 LAB
ANION GAP SERPL CALCULATED.3IONS-SCNC: 7 MMOL/L (ref 4–13)
APPEARANCE FLD: CLEAR
BUN SERPL-MCNC: 45 MG/DL (ref 5–25)
CALCIUM SERPL-MCNC: 9.3 MG/DL (ref 8.4–10.2)
CHLORIDE SERPL-SCNC: 97 MMOL/L (ref 96–108)
CO2 SERPL-SCNC: 30 MMOL/L (ref 21–32)
COLOR FLD: YELLOW
CREAT SERPL-MCNC: 1.68 MG/DL (ref 0.6–1.3)
GFR SERPL CREATININE-BSD FRML MDRD: 28 ML/MIN/1.73SQ M
GLUCOSE FLD-MCNC: 181 MG/DL
GLUCOSE SERPL-MCNC: 115 MG/DL (ref 65–140)
GLUCOSE SERPL-MCNC: 130 MG/DL (ref 65–140)
GLUCOSE SERPL-MCNC: 135 MG/DL (ref 65–140)
GLUCOSE SERPL-MCNC: 169 MG/DL (ref 65–140)
GLUCOSE SERPL-MCNC: 210 MG/DL (ref 65–140)
HISTIOCYTES NFR FLD: 71 %
LDH FLD L TO P-CCNC: 67 U/L
LYMPHOCYTES NFR BLD AUTO: 15 %
MONO+MESO NFR FLD MANUAL: 2 %
NEUTS SEG NFR BLD AUTO: 12 %
PH BODY FLUID: 7.8
POTASSIUM SERPL-SCNC: 5.2 MMOL/L (ref 3.5–5.3)
PROT FLD-MCNC: <3 G/DL
SITE: NORMAL
SODIUM SERPL-SCNC: 134 MMOL/L (ref 135–147)
TOTAL CELLS COUNTED SPEC: 100
TOTAL NUCLEATED CELL COUNT: 508 /UL
TOTAL PROTEIN FLUID: 2.2 G/DL

## 2024-08-13 PROCEDURE — 87070 CULTURE OTHR SPECIMN AEROBIC: CPT

## 2024-08-13 PROCEDURE — 88305 TISSUE EXAM BY PATHOLOGIST: CPT | Performed by: PATHOLOGY

## 2024-08-13 PROCEDURE — 80048 BASIC METABOLIC PNL TOTAL CA: CPT

## 2024-08-13 PROCEDURE — 99233 SBSQ HOSP IP/OBS HIGH 50: CPT | Performed by: NURSE PRACTITIONER

## 2024-08-13 PROCEDURE — 32555 ASPIRATE PLEURA W/ IMAGING: CPT

## 2024-08-13 PROCEDURE — 84157 ASSAY OF PROTEIN OTHER: CPT

## 2024-08-13 PROCEDURE — 82948 REAGENT STRIP/BLOOD GLUCOSE: CPT

## 2024-08-13 PROCEDURE — 89051 BODY FLUID CELL COUNT: CPT

## 2024-08-13 PROCEDURE — 99233 SBSQ HOSP IP/OBS HIGH 50: CPT | Performed by: INTERNAL MEDICINE

## 2024-08-13 PROCEDURE — 88112 CYTOPATH CELL ENHANCE TECH: CPT | Performed by: PATHOLOGY

## 2024-08-13 PROCEDURE — 82945 GLUCOSE OTHER FLUID: CPT

## 2024-08-13 PROCEDURE — 87205 SMEAR GRAM STAIN: CPT

## 2024-08-13 PROCEDURE — 83615 LACTATE (LD) (LDH) ENZYME: CPT

## 2024-08-13 PROCEDURE — 83986 ASSAY PH BODY FLUID NOS: CPT

## 2024-08-13 PROCEDURE — 99232 SBSQ HOSP IP/OBS MODERATE 35: CPT | Performed by: FAMILY MEDICINE

## 2024-08-13 PROCEDURE — 32555 ASPIRATE PLEURA W/ IMAGING: CPT | Performed by: RADIOLOGY

## 2024-08-13 PROCEDURE — 94760 N-INVAS EAR/PLS OXIMETRY 1: CPT

## 2024-08-13 PROCEDURE — 94640 AIRWAY INHALATION TREATMENT: CPT

## 2024-08-13 RX ORDER — LIDOCAINE WITH 8.4% SOD BICARB 0.9%(10ML)
SYRINGE (ML) INJECTION AS NEEDED
Status: COMPLETED | OUTPATIENT
Start: 2024-08-13 | End: 2024-08-13

## 2024-08-13 RX ADMIN — ROPINIROLE HYDROCHLORIDE 2 MG: 1 TABLET, FILM COATED ORAL at 21:58

## 2024-08-13 RX ADMIN — BUMETANIDE 2 MG: 1 TABLET ORAL at 09:04

## 2024-08-13 RX ADMIN — Medication 1000 UNITS: at 09:00

## 2024-08-13 RX ADMIN — ZOLPIDEM TARTRATE 10 MG: 5 TABLET, COATED ORAL at 21:58

## 2024-08-13 RX ADMIN — Medication 8 ML: at 12:00

## 2024-08-13 RX ADMIN — FLECAINIDE ACETATE 150 MG: 50 TABLET ORAL at 09:00

## 2024-08-13 RX ADMIN — INSULIN LISPRO 1 UNITS: 100 INJECTION, SOLUTION INTRAVENOUS; SUBCUTANEOUS at 12:22

## 2024-08-13 RX ADMIN — POTASSIUM CHLORIDE 20 MEQ: 1500 TABLET, EXTENDED RELEASE ORAL at 09:00

## 2024-08-13 RX ADMIN — LEVALBUTEROL HYDROCHLORIDE 1.25 MG: 1.25 SOLUTION RESPIRATORY (INHALATION) at 19:48

## 2024-08-13 RX ADMIN — GUAIFENESIN AND DEXTROMETHORPHAN 10 ML: 100; 10 SYRUP ORAL at 13:16

## 2024-08-13 RX ADMIN — OXYCODONE HYDROCHLORIDE AND ACETAMINOPHEN 500 MG: 500 TABLET ORAL at 09:00

## 2024-08-13 RX ADMIN — GABAPENTIN 300 MG: 300 CAPSULE ORAL at 21:58

## 2024-08-13 RX ADMIN — FAMOTIDINE 20 MG: 20 TABLET, FILM COATED ORAL at 17:07

## 2024-08-13 RX ADMIN — METOPROLOL SUCCINATE 75 MG: 50 TABLET, EXTENDED RELEASE ORAL at 04:53

## 2024-08-13 RX ADMIN — METOPROLOL SUCCINATE 75 MG: 50 TABLET, EXTENDED RELEASE ORAL at 17:08

## 2024-08-13 RX ADMIN — DILTIAZEM HYDROCHLORIDE 120 MG: 120 CAPSULE, COATED, EXTENDED RELEASE ORAL at 09:00

## 2024-08-13 RX ADMIN — GUAIFENESIN AND DEXTROMETHORPHAN 10 ML: 100; 10 SYRUP ORAL at 17:11

## 2024-08-13 RX ADMIN — LEVALBUTEROL HYDROCHLORIDE 1.25 MG: 1.25 SOLUTION RESPIRATORY (INHALATION) at 13:35

## 2024-08-13 RX ADMIN — GUAIFENESIN AND DEXTROMETHORPHAN 10 ML: 100; 10 SYRUP ORAL at 21:58

## 2024-08-13 RX ADMIN — INSULIN GLARGINE 10 UNITS: 100 INJECTION, SOLUTION SUBCUTANEOUS at 21:59

## 2024-08-13 RX ADMIN — APIXABAN 5 MG: 5 TABLET, FILM COATED ORAL at 17:07

## 2024-08-13 RX ADMIN — EZETIMIBE 10 MG: 10 TABLET ORAL at 21:58

## 2024-08-13 RX ADMIN — FLECAINIDE ACETATE 150 MG: 50 TABLET ORAL at 17:07

## 2024-08-13 NOTE — PROGRESS NOTES
[facility]  Progress Note  Name: Farnaz Martin I  MRN: [id number]  Unit/Bed#: S -01 I Date of Admission: 8/6/2024   Date of Service: 8/13/2024 I Hospital Day: 7    Assessment & Plan   Palliative care patient  Assessment & Plan  Social Support:  Supportive listening provided  Normalized experience of patient/family  Provided anxiety containment     Follow-up  We appreciate the opportunity to participate in this patient's care.   We will continue to follow. PSC to follow up.  Please do not hesitate to contact our on-call provider through our clinic answering service at [phone] should there be an acute change or other symptom control concerns.             Decisional apparatus:  Patient with capacity to make medical decisions on my exam today. If such capacity is lost, patient's substitute decision maker would default Coral Garrison patients daughter.           Advance Directive / Living Will / POLST:  None on file - Coral Garrison is legal and medical POA and this was reiterated by patient bedside.      I have reviewed the patient's controlled substance dispensing history in the Prescription Drug Monitoring Program in compliance with the [facility] regulations before prescribing any controlled substances.    Recurrent pleural effusion on right  Assessment & Plan   Recurrent right-sided pleural effusion:     Planning on palliative thoracentesis prior to discharge possible plurex cath     Paroxysmal A-fib (HCC)  Assessment & Plan    SP Albation 2021  PLAN:  Apixaban 5mg PO BID  Flecainede 150mg PO BID  Metoprolol 75mg PO BID  Diltiazem 120mg PO TID  Continue diltiazem 120 mg daily3.    Cough  Assessment & Plan    ANTI TUSSIVE:   Benzonatate 100mg PO q 3hrs PRN  Dextromethorphan-Guaifenesin PO Syrup 10ml PRN         Elevated serum creatinine  Assessment & Plan  Recent Labs     08/11/24  0605 08/12/24  0620 08/13/24  0449   CREATININE 1.35* 1.44* 1.68*   EGFR 37 34 28     Estimated Creatinine  Clearance: 25.2 mL/min (A) (by C-G formula based on SCr of 1.68 mg/dL (H)).  POA 1.38; (baseline 1.1-1.28)     Plan:  PO Bumex 2mg twice daily  Monitor BMP, IV fluids held  Avoid hypoperfusion of the kidneys, minimize nephrotoxins  Currently at baseline, continue to monitor with daily labs       Goals of care, counseling/discussion  Assessment & Plan  Goals:   Patient is still requiring oxygen and easily out of breath but mentation in at baseline.   Patient does confirm that Coral Garrison is medical and legal POA and would be his healthcare agent in the event he is unable to make his own decisions.  Patient and family a little frustrated that heart failure team did not see them as they were told on  and that the prognosis timeline has not been covered.   Goal remains focused on disease directed cares at this time.  Palliative will follow for ongoing goals of care discussions as situation evolves.  Serious illness conversation completed in the room with patient and daughter and son in law       * Acute on chronic heart failure (HCC)  Assessment & Plan  Wt Readings from Last 3 Encounters:   24 78.6 kg (173 lb 4.5 oz)   24 72 kg (158 lb 12.8 oz)   24 71.9 kg (158 lb 8.2 oz)     Lab Results   Component Value Date    BNP 1,091 (H) 2024    BNP 1,428 (H) 2024    LVEF 50 2024           Baseline O2 2L  On admission  pt presented w/ acute on chronic respiratory failure w/ hypoxia,  requireing nebs 4L NC oxygen Acute and chronic respiratory failure with hypoxia 2/2 CHF exacerbation a/e/b hypoxia, increased oxygen demands requiring CXR, Nebs, PO bumex and 4L NC oxygen.   SXS: 4 day hx of SOB, dyspnea on exertion, productive cough, weight gain, lower extremity edeam. Could not complete sentences at rest, orthopnea  Recent admission started on 10mg Torsemide PO Daily     [id number] XR chest portable  Airspace disease and pleural effusion on the right.   [id number] XR  chest portable  Persistent large right pleural effusion with right base atelectasis. Pneumonia not excluded in the appropriate clinical setting.   83MIC28 XR chest portable  No significant change of pleural effusions, pulmonary opacities, or cardiomediastinal silhouette   64ELT80 XR chest 2 views  Mild pulmonary edema, improved. Grossly stable right greater than left pleural effusions.  [id number] ECHO    Left Ventricle: Left ventricular cavity size is normal. Wall thickness is normal. The left ventricular ejection fraction is 55%. Systolic function is normal. Wall motion is normal.    Right Ventricle: Right ventricular cavity size is normal. Systolic function is normal. A pacer wire is present.    Aortic Valve: There is trace regurgitation.    Mitral Valve: There is moderate to severe regurgitation.    Tricuspid Valve: There is mild to moderate regurgitation. The right ventricular systolic pressure is severely elevated. The estimated right ventricular systolic pressure is 66.00 mmHg.    Pericardium: There is a left pleural effusion.    Prior TTE study available for comparison. Prior study date: 12/5/2023. Changes noted when compared to prior study. Changes include: Mitral regurgitation has worsened.      Plan:  Cardiology on board  Bumex 2mg PO BID  Toprolo 75mg PO BID  Cardiac diet  Symptomatic thoracentesis w/ possible plurex prior to discharge   Currently patient is volume overloaded with intravascular depletion which has been difficult to balance                           IDENTIFICATION:  Inpatient consult to Palliative Care  Consult performed by: Sandoval Barger MD  Consult ordered by: Steve rTipathi MD        Physician Requesting Consult: Gladis Pabon MD  Date of admission:  Reason for Consult / Principal Problem: GOC counseling and SM secondary to HFpEF, CKD III        History of Present Illness:  Farnaz Martin is a 79 y.o. female who presents with a palliative diagnosis of CKDIII HFpEF Basal Cell  Carcinoma, Malignant neoplasm of thyroid gland, MGUS  was [facility] ED at [facility] on [id number] secondary to Acute respiratory failure with hypoxia.   Pt w/ recent readmissions for SOB 2/2 to pleural effusions suspected to be 2/2 to HFpEF. Pt endorsed a 3 day hx of dyspnea, cough, weight gain, orthopnea, pitting edema, fatigue and presyncope.  At night pt unable to breath when lying flat. Baseline dry anusha 72kg was 76 kg on admission.    ED imaging suggested of pulm edema bilateral pleural effusions. Since admission cardiology and pulmonology have been consulted and there is discussion of palliative thoracentesis with potential plurex cath.         Review of Systems   Constitutional:  Positive for activity change.   Respiratory:  Positive for apnea, cough and shortness of breath.    Neurological:  Positive for light-headedness.         Past Medical History:   Diagnosis Date    Actinic keratosis     last assessed - 06Jun2014    Arthritis     Atrial fibrillation with rapid ventricular response (HCC)     last assessed - 26Apr2016    Atrial fibrillation with rapid ventricular response (HCC) 04/19/2016    Basal cell carcinoma     Benign colon polyp     last assessed - 27Apr2015    Bronchiectasis without complication (HCC)     CHF (congestive heart failure) (HCC) 06/2022    Chronic diastolic CHF (congestive heart failure) (HCC)     Disease of thyroid gland     Effusion of knee joint right     Resolved - 19Apr2016    Esophageal reflux     Fibromyalgia     last assessed - [id number]    Fibromyalgia, primary     GERD (gastroesophageal reflux disease)     Hyperlipidemia     Hypertension     Hyponatremia     Malignant neoplasm of thyroid gland (HCC)     Meningioma (HCC)     MGUS (monoclonal gammopathy of unknown significance)     Palpitations     last assessed - 30Apr2013    Peroneal tendonitis, right     last assessed - 02Oct2013    Right lumbar radiculopathy 03/17/2016    Thyroid cancer (HCC)     Thyroid nodule      Trochanteric bursitis of left hip 03/09/2018     Past Surgical History:   Procedure Laterality Date    CARDIAC ELECTROPHYSIOLOGY STUDY AND ABLATION  08/2022    CATARACT EXTRACTION Bilateral     COLONOSCOPY  03/2018    COLONOSCOPY W/ POLYPECTOMY  2021    repeat in 5 years    EYE SURGERY      IR THORACENTESIS  7/27/2024    OOPHORECTOMY      MT NEUROPLASTY &/TRANSPOS MEDIAN NRV CARPAL TUNNE Right 11/14/2019    Procedure: RELEASE CARPAL TUNNEL;  Surgeon: Onesimo Tate MD;  Location: [facility];  Service: Orthopedics    MT TOTAL THYROID LOBECTOMY UNI W/WO ISTHMUSECTOMY Left 12/16/2020    Procedure: Left THYROID LOBECTOMY;  Surgeon: Jhony Bhatt MD;  Location: [facility];  Service: ENT    RECTAL POLYPECTOMY      REPLACEMENT TOTAL KNEE Left     last assessed - 27Apr2015    TONSILLECTOMY      TOTAL ABDOMINAL HYSTERECTOMY      TUBAL LIGATION      US GUIDED THYROID BIOPSY  10/14/2020     Social History     Socioeconomic History    Marital status:      Spouse name: Not on file    Number of children: Not on file    Years of education: Not on file    Highest education level: Not on file   Occupational History    Not on file   Tobacco Use    Smoking status: Never    Smokeless tobacco: Never   Vaping Use    Vaping status: Never Used   Substance and Sexual Activity    Alcohol use: Not Currently    Drug use: Never    Sexual activity: Not Currently   Other Topics Concern    Not on file   Social History Narrative    ** Merged History Encounter **          Social Determinants of Health     Financial Resource Strain: Patient Unable To Answer (12/19/2023)    Overall Financial Resource Strain (CARDIA)     Difficulty of Paying Living Expenses: Patient unable to answer   Food Insecurity: No Food Insecurity (7/25/2024)    Hunger Vital Sign     Worried About Running Out of Food in the Last Year: Never true     Ran Out of Food in the Last Year: Never true   Transportation Needs: No Transportation Needs (7/25/2024)    PRAPARE -  Transportation     Lack of Transportation (Medical): No     Lack of Transportation (Non-Medical): No   Physical Activity: Not on file   Stress: Not on file   Social Connections: Not on file   Intimate Partner Violence: Not At Risk (7/31/2023)    [facility]    Humiliation, Afraid, Rape, and Kick questionnaire     Fear of Current or Ex-Partner: No     Emotionally Abused: No     Physically Abused: No     Sexually Abused: No   Housing Stability: Low Risk  (7/25/2024)    Housing Stability Vital Sign     Unable to Pay for Housing in the Last Year: No     Number of Times Moved in the Last Year: 0     Homeless in the Last Year: No     Family History   Problem Relation Age of Onset    Heart disease Mother     Diabetes Mother     Heart disease Father     Coronary artery disease Father     Stroke Father         cerebrovascular accident    Heart attack Father         myocardial infarction    Sudden death Father         scd    Other Family         Back disorder    Coronary artery disease Family     Neuropathy Family     Osteoporosis Family     No Known Problems Daughter     No Known Problems Maternal Grandmother     No Known Problems Maternal Grandfather     No Known Problems Paternal Grandmother     No Known Problems Paternal Grandfather     Cancer Maternal Uncle     Breast cancer Maternal Aunt 65    No Known Problems Son     No Known Problems Maternal Aunt     No Known Problems Maternal Aunt     No Known Problems Maternal Aunt     No Known Problems Paternal Aunt     No Known Problems Paternal Aunt     Anuerysm Neg Hx     Clotting disorder Neg Hx     Arrhythmia Neg Hx     Heart failure Neg Hx        Medications:  all current active meds have been reviewed and current meds:   Current Facility-Administered Medications   Medication Dose Route Frequency    acetaminophen (TYLENOL) tablet 650 mg  650 mg Oral Q8H PRN    albuterol (PROVENTIL HFA,VENTOLIN HFA) inhaler 2 puff   2 puff Inhalation Q6H PRN    apixaban (ELIQUIS) tablet 5 mg  5 mg Oral BID    ascorbic acid (VITAMIN C) tablet 500 mg  500 mg Oral Daily    benzonatate (TESSALON PERLES) capsule 100 mg  100 mg Oral TID PRN    Cholecalciferol (VITAMIN D3) tablet 1,000 Units  1,000 Units Oral Daily    dextromethorphan-guaiFENesin (ROBITUSSIN DM) oral syrup 10 mL  10 mL Oral Q4H PRN    diltiazem (CARDIZEM CD) 24 hr capsule 120 mg  120 mg Oral Daily    ezetimibe (ZETIA) tablet 10 mg  10 mg Oral HS    famotidine (PEPCID) tablet 20 mg  20 mg Oral BID    flecainide (TAMBOCOR) tablet 150 mg  150 mg Oral BID    gabapentin (NEURONTIN) capsule 300 mg  300 mg Oral HS    insulin glargine (LANTUS) subcutaneous injection 10 Units 0.1 mL  10 Units Subcutaneous HS    insulin lispro (HumALOG/ADMELOG) 100 units/mL subcutaneous injection 1-5 Units  1-5 Units Subcutaneous TID AC    levalbuterol (XOPENEX) inhalation solution 1.25 mg  1.25 mg Nebulization TID    loratadine (CLARITIN) tablet 10 mg  10 mg Oral Daily    metoprolol succinate (TOPROL-XL) 24 hr tablet 75 mg  75 mg Oral Q12H    potassium chloride (Klor-Con M20) CR tablet 20 mEq  20 mEq Oral BID    rOPINIRole (REQUIP) tablet 2 mg  2 mg Oral HS    zolpidem (AMBIEN) tablet 10 mg  10 mg Oral HS       Allergies   Allergen Reactions    Sotalol Other (See Comments)     Prolonged QTc leading to torsades de pointes     Penicillins Other (See Comments)     As a child calcium deposit in the arm     Ace Inhibitors GI Intolerance     Did feel well on it    Omeprazole Abdominal Pain, Rash and Vomiting     stomach pain, vomiting, rash       Objective:  /86   Pulse 60   Temp 98 °F (36.7 °C) (Oral)   Resp 18   Wt 74 kg (163 lb 2.3 oz)   LMP 02/01/1990 (Within Weeks)   SpO2 (!) 85%   BMI 29.84 kg/m²     Physical Exam  Vitals and nursing note reviewed.   Constitutional:       Appearance: She is ill-appearing.   HENT:      Nose: Nose normal.   Eyes:      General: No scleral icterus.        Right eye:  "No discharge.         Left eye: No discharge.      Conjunctiva/sclera: Conjunctivae normal.   Cardiovascular:      Rate and Rhythm: Bradycardia present.   Pulmonary:      Effort: Respiratory distress present.      Breath sounds: Wheezing and rales present.   Neurological:      Mental Status: She is oriented to person, place, and time.   Psychiatric:         Behavior: Behavior normal.           Lab Results: I have personally reviewed pertinent labs., CBC: No results found for: \"WBC\", \"HGB\", \"HCT\", \"MCV\", \"PLT\", \"ADJUSTEDWBC\", \"RBC\", \"MCH\", \"MCHC\", \"RDW\", \"MPV\", \"NRBC\", CMP:   Lab Results   Component Value Date    SODIUM 134 (L) 08/13/2024    K 5.2 08/13/2024    CL 97 08/13/2024    CO2 30 08/13/2024    BUN 45 (H) 08/13/2024    CREATININE 1.68 (H) 08/13/2024    CALCIUM 9.3 08/13/2024    EGFR 28 08/13/2024   , BMP:  Lab Results   Component Value Date    SODIUM 134 (L) 08/13/2024    K 5.2 08/13/2024    CL 97 08/13/2024    CO2 30 08/13/2024    BUN 45 (H) 08/13/2024    CREATININE 1.68 (H) 08/13/2024    GLUC 135 08/13/2024    CALCIUM 9.3 08/13/2024    AGAP 7 08/13/2024    EGFR 28 08/13/2024   , PT/PTT:No results found for: \"PT\", \"PTT\"  Imaging Studies: I have personally reviewed pertinent reports.  EKG, Pathology, and Other Studies: I have personally reviewed pertinent reports.    Counseling / Coordination of Care  Total floor / unit time spent today 60  minutes. Greater than 50% of total time was spent with the patient and / or family counseling and / or coordination of care. A description of the counseling / coordination of care: Reviewed chart,  provided medical updates, determined goals of care, discussed palliative care and symptom management, discussed comfort care and hospice care, discussed code status, provided anticipatory guidance, determined competency and POA/HCA, determined social/family support, provided psychosocial support. Reviewed with SLIM, ICU team, Hospice Liaison, RN and CM.      Portions of this " "document may have been created using dictation software and as such some \"sound alike\" terms may have been generated by the system. Do not hesitate to contact me with any questions or clarifications.     "

## 2024-08-13 NOTE — ASSESSMENT & PLAN NOTE
Recent Labs     08/11/24  0605 08/12/24  0620 08/13/24  0449   CREATININE 1.35* 1.44* 1.68*   EGFR 37 34 28       Estimated Creatinine Clearance: 25.2 mL/min (A) (by C-G formula based on SCr of 1.68 mg/dL (H)).  POA 1.38; (baseline 1.1-1.28)     Plan:  PO Bumex 2mg twice daily  Monitor BMP, IV fluids held  Avoid hypoperfusion of the kidneys, minimize nephrotoxins  Currently at baseline, continue to monitor with daily labs

## 2024-08-13 NOTE — BRIEF OP NOTE (RAD/CATH)
INTERVENTIONAL RADIOLOGY PROCEDURE NOTE    Date: 8/13/2024    Procedure: IR THORACENTESIS     Preoperative diagnosis:   1. Acute exacerbation of CHF (congestive heart failure) (HCC)    2. Hypoxia    3. Elevated serum creatinine    4. Pleural effusion    5. Acute on chronic diastolic heart failure (HCC)         Postoperative diagnosis: Same.    Surgeon: Anna Grullon MD     Assistant: None. No qualified resident was available.    Blood loss: Minimal    Specimens: None    Findings:    Right thoracentesis.    1300 mL of clear yellow fluid removed.    I was asked to see if we can place a tunneled pleural catheter.  She was not set up to undergo tunneled pleural catheter placement today.    Please reconsult interventional radiology next week after the pleural effusion reaccumulates.    Complications: None immediate.    Anesthesia: local

## 2024-08-13 NOTE — ASSESSMENT & PLAN NOTE
Social Support:  Supportive listening provided  Normalized experience of patient/family  Provided anxiety containment     Follow-up  We appreciate the opportunity to participate in this patient's care.   We will continue to follow. PSC to follow up.  Please do not hesitate to contact our on-call provider through our clinic answering service at [phone] should there be an acute change or other symptom control concerns.             Decisional apparatus:  Patient with capacity to make medical decisions on my exam today. If such capacity is lost, patient's substitute decision maker would default Coral Garrison patients daughter.           Advance Directive / Living Will / POLST:  None on file - Coral Garrison is legal and medical POA and this was reiterated by patient bedside.      I have reviewed the patient's controlled substance dispensing history in the Prescription Drug Monitoring Program in compliance with the [facility] regulations before prescribing any controlled substances.

## 2024-08-13 NOTE — ASSESSMENT & PLAN NOTE
Recent Labs     08/11/24  0605 08/12/24  0620 08/13/24  0449   CREATININE 1.35* 1.44* 1.68*   EGFR 37 34 28     Estimated Creatinine Clearance: 25.9 mL/min (A) (by C-G formula based on SCr of 1.68 mg/dL (H)).    Elevated creatinine, continue to monitor    Plan:  PO Bumex 2mg twice daily  Monitor BMP, IV fluids held  Avoid hypoperfusion of the kidneys, minimize nephrotoxins  Currently at baseline, continue to monitor with daily labs

## 2024-08-13 NOTE — ASSESSMENT & PLAN NOTE
Social Support:  Supportive listening provided  Normalized experience of patient/family  Provided anxiety containment     Follow-up  We appreciate the opportunity to participate in this patient's care.   We will continue to follow. PSC to follow up.  Please do not hesitate to contact our on-call provider through our clinic answering service at [phone] should there be an acute change or other symptom control concerns.             Decisional apparatus:  Patient with capacity to make medical decisions on my exam today. If such capacity is lost, patient's substitute decision maker would default Coral Garrison patients daughter.           Advance Directive / Living Will / POLST:  None on file - Coral Garrison is legal and medical POA and this was reiterated by patient bedside.      I have reviewed the patient's controlled substance dispensing history in the Prescription Drug Monitoring Program in compliance with the [facility] regulations before prescribing any controlled substances.

## 2024-08-13 NOTE — ASSESSMENT & PLAN NOTE
Recurrent right-sided pleural effusion:     Planning on palliative thoracentesis prior to discharge possible plurex cath

## 2024-08-13 NOTE — PROGRESS NOTES
Critical access hospital  Progress Note  Name: Farnaz Martin I  MRN: [id number]  Unit/Bed#: S -01 I Date of Admission: 8/6/2024   Date of Service: 8/13/2024 I Hospital Day: 7    Assessment & Plan   * Acute on chronic heart failure (HCC)  Assessment & Plan    Lab Results   Component Value Date    LVEF 50 07/14/2024    BNP 1,091 (H) 08/06/2024    BNP 1,428 (H) 07/24/2024     Acute and chronic respiratory failure with hypoxia 2/2 CHF exacerbation a/e/b hypoxia, increased oxygen demands requiring CXR, Nebs, PO bumex and 4L NC oxygen.    Presents with increased shortness of breath, dyspnea on exertion, lower extremity edema, and cough with productive sputum  Patient is currently volume overloaded.  Initial hx: 3 to 4-day history of progressively worsening dyspnea, dry cough, worsening lower extremity edema.  She required increased oxygen when EMS arrived. difficulty talking normal sentences secondary to shortness of breath.  She is now unable to lie flat.  She was discharged on recent admission with 10 mg torsemide daily.  Clinical suspicion is under diuresis leading to volume overload.  Findings: ED provider: She is at baseline on 2 L O2 supplemental O2 and, because of hypoxia to the 80s, was bumped up to 4.  On arrival 97% on 4L (baseline chronic 2L NC)  Currently pt is on room air saturating appopriately for her, diuresed approximately 3.9L of fluid since admission, improving SOB   Was seen by heart failure team 8/12, no recommendations were given    Serial cxr shows stable pleural effusion   Most recent Echo 50% LVEF     Weight (last 2 days)       Date/Time Weight    08/12/24 0600 77.8 (171.52)    08/11/24 0600 73 (160.94)          I/O: - 3.9 L since admission    Plan:  Per cardiology: PO Bumex to 2 mg BID.   kdur 20 meq BID. , B-Blocker: Toprol   P.O. Bumex 2mg BID  Cardiac diet, sodium restriction  CMP, magnesium a.m; Goal Mg > 2 and K > 4; Replete prn  Daily standing weights, Measure  I/O  Thoracocentesis performed, 1300 mL clear yellow fluid drained from right pleural space.           Elevated serum creatinine  Assessment & Plan    Recent Labs     08/11/24  0605 08/12/24  0620 08/13/24  0449   CREATININE 1.35* 1.44* 1.68*   EGFR 37 34 28     Estimated Creatinine Clearance: 25.9 mL/min (A) (by C-G formula based on SCr of 1.68 mg/dL (H)).    Elevated creatinine, continue to monitor    Plan:  PO Bumex 2mg twice daily  Monitor BMP, IV fluids held  Avoid hypoperfusion of the kidneys, minimize nephrotoxins  Currently at baseline, continue to monitor with daily labs    Cough  Assessment & Plan  Pt endorsing orthopnea, SOB  Productive in nature without production of sputum  Uses Spiriva inhaler and Robitussin at home for sx's  R/p chest x-ray shows increased congestion, persistent pleural effusion that is worsening on chest x-rays  Cough component likely CHF exacerbation  Pulmonology was consulted, recommended no procedural interventions at this time, but recommended   -Continue XOPENEX inhalation solution 1.25 mg   -Continue home inhalers and nebulizer treatment on discharge.   -Possible home oxygen evaluation     Plan:  Atrovent neb four times daily  XOPENEX inhalation solution 1.25 mg   Continue home inhalers and nebulizer treatment on discharge.   Possible home oxygen evaluation   Thoracocentesis performed, 1300 mL clear yellow fluid drained from right pleural space.       Paroxysmal A-fib (HCC)  Assessment & Plan  Afib-  S/p ablation in 03/2021.   EKG- AV dual-paced rhythm, Vent. rate has decreased by 14 BPM since prior EKG  Low voltage QRS    Plan:  Eliquis 5 mg twice daily  Continue flecainide 150 mg twice daily and metoprolol succinate 75 mg twice daily  Continue diltiazem 120 mg daily    Recurrent pleural effusion on right  Assessment & Plan  Thoracocentesis performed, 1300 mL clear yellow fluid drained from right pleural space.                VTE Pharmacologic Prophylaxis: VTE Score: 5 High  Risk (Score >/= 5) - Pharmacological DVT Prophylaxis Ordered: apixaban (Eliquis). Sequential Compression Devices Ordered.    Mobility:   Basic Mobility Inpatient Raw Score: 23  JH-HLM Goal: 7: Walk 25 feet or more  JH-HLM Achieved: 6: Walk 10 steps or more  JH-HLM Goal achieved. Continue to encourage appropriate mobility.    Patient Centered Rounds: I performed bedside rounds with nursing staff today.   Discussions with Specialists or Other Care Team Provider: cardiology, pulmonology, heart failure    Education and Discussions with Family / Patient: Updated  (daughter) at bedside.    Total Time Spent on Date of Encounter in care of patient: 60 mins. This time was spent on one or more of the following: performing physical exam; counseling and coordination of care; obtaining or reviewing history; documenting in the medical record; reviewing/ordering tests, medications or procedures; communicating with other healthcare professionals and discussing with patient's family/caregivers.    Current Length of Stay: 7 day(s)  Current Patient Status: Inpatient   Certification Statement: The patient will continue to require additional inpatient hospital stay due to thoracocentesis, diuresis  Discharge Plan: Anticipate discharge in 24-48 hrs to rehab facility.    Code Status: Level 3 - DNAR and DNI    Subjective:   Evaluated patient at bedside, appears to be resting comfortably in bed eating breakfast.  She denies any shortness of breath or increased cough, but she states she is feeling more tired today.      Objective:     Vitals:   Temp (24hrs), Av °F (36.7 °C), Min:97.6 °F (36.4 °C), Max:98.3 °F (36.8 °C)    Temp:  [97.6 °F (36.4 °C)-98.3 °F (36.8 °C)] 98 °F (36.7 °C)  HR:  [60-66] 60  Resp:  [18] 18  BP: ()/(53-91) 110/63  SpO2:  [82 %-98 %] 98 %  Body mass index is 29.84 kg/m².     Input and Output Summary (last 24 hours):     Intake/Output Summary (Last 24 hours) at 2024 1217  Last data filed at  8/13/2024 1052  Gross per 24 hour   Intake 240 ml   Output 550 ml   Net -310 ml     Physical Exam  Vitals and nursing note reviewed.   Constitutional:       General: She is not in acute distress.     Appearance: She is well-developed.   HENT:      Head: Normocephalic and atraumatic.   Eyes:      Conjunctiva/sclera: Conjunctivae normal.   Cardiovascular:      Rate and Rhythm: Normal rate and regular rhythm.      Heart sounds: No murmur heard.  Pulmonary:      Effort: Pulmonary effort is normal. No respiratory distress.      Breath sounds: decreased R breath sounds in lower lobe   Abdominal:      Palpations: Abdomen is soft.      Tenderness: There is no abdominal tenderness.   Musculoskeletal:         General: No swelling.      Cervical back: Neck supple.      Right lower leg: Edema (1+) present.      Left lower leg: Edema (1+) present.   Skin:     General: Skin is warm and dry.      Capillary Refill: Capillary refill takes less than 2 seconds.   Neurological:      Mental Status: She is alert.   Psychiatric:         Mood and Affect: Mood normal.     Additional Data:     Labs:  Results from last 7 days   Lab Units 08/12/24  0620   WBC Thousand/uL 7.53   HEMOGLOBIN g/dL 11.8   HEMATOCRIT % 37.3   PLATELETS Thousands/uL 242   SEGS PCT % 70   LYMPHO PCT % 17   MONO PCT % 11   EOS PCT % 1     Results from last 7 days   Lab Units 08/13/24  0449 08/07/24  0528 08/06/24  1405   SODIUM mmol/L 134*   < > 135   POTASSIUM mmol/L 5.2   < > 4.3   CHLORIDE mmol/L 97   < > 98   CO2 mmol/L 30   < > 29   BUN mg/dL 45*   < > 31*   CREATININE mg/dL 1.68*   < > 1.38*   ANION GAP mmol/L 7   < > 8   CALCIUM mg/dL 9.3   < > 9.0   ALBUMIN g/dL  --   --  3.7   TOTAL BILIRUBIN mg/dL  --   --  0.87   ALK PHOS U/L  --   --  70   ALT U/L  --   --  39   AST U/L  --   --  36   GLUCOSE RANDOM mg/dL 135   < > 138    < > = values in this interval not displayed.         Results from last 7 days   Lab Units 08/13/24  1114 08/13/24  0737 08/12/24  0062  08/12/24  1936 08/12/24  1552 08/12/24  1110 08/12/24  0751 08/11/24  2220 08/11/24  1549 08/11/24  1121 08/11/24  0750 08/10/24  2125   POC GLUCOSE mg/dl 210* 115 251* 219* 111 189* 133 251* 136 232* 115 160*               Lines/Drains:  Invasive Devices       Peripheral Intravenous Line  Duration             Peripheral IV 08/06/24 Left Antecubital 6 days                      Imaging: Reviewed radiology reports from this admission including: chest xray    Recent Cultures (last 7 days):         Last 24 Hours Medication List:   Current Facility-Administered Medications   Medication Dose Route Frequency Provider Last Rate    acetaminophen  650 mg Oral Q8H PRN Steve Tripathi MD      albuterol  2 puff Inhalation Q6H PRN Steve Tripathi MD      apixaban  5 mg Oral BID Steve Tripathi MD      ascorbic acid  500 mg Oral Daily Steve Tripathi MD      benzonatate  100 mg Oral TID PRN Steve Tripathi MD      Cholecalciferol  1,000 Units Oral Daily Steve Tripathi MD      dextromethorphan-guaiFENesin  10 mL Oral Q4H PRN Odin Castro MD      diltiazem  120 mg Oral Daily Steve Tripathi MD      ezetimibe  10 mg Oral HS Steve Tripathi MD      famotidine  20 mg Oral BID Steve Tripathi MD      flecainide  150 mg Oral BID Steve Tripathi MD      gabapentin  300 mg Oral HS Steve Tripathi MD      insulin glargine  10 Units Subcutaneous HS Steve Tripathi MD      insulin lispro  1-5 Units Subcutaneous TID AC Steve Tripathi MD      levalbuterol  1.25 mg Nebulization TID Fauzia Pennington MD      loratadine  10 mg Oral Daily Steve Tripathi MD      metoprolol succinate  75 mg Oral Q12H Steve Tripathi MD      rOPINIRole  2 mg Oral HS Steve Tripathi MD      zolpidem  10 mg Oral HS Steve Tripathi MD          Today, Patient Was Seen By: Steve Tripathi MD    **Please Note: This note may have been constructed using a voice recognition system.**

## 2024-08-13 NOTE — PLAN OF CARE

## 2024-08-13 NOTE — CASE MANAGEMENT
Case Management Discharge Planning Note    Patient name Farnaz Martin  Location S /S -01 MRN [id number]  : 1945 Date 2024       Current Admission Date: 2024  Current Admission Diagnosis:Acute on chronic heart failure (HCC)   Patient Active Problem List    Diagnosis Date Noted Date Diagnosed    Palliative care patient 2024     Shortness of breath 2024     Thyroid nodule 2024     Acute kidney injury superimposed on CKD (chronic kidney disease) stage 3, GFR 30-59 ml/min ([facility]) 2024     Fatigue 2024     Recurrent pleural effusion on right 2024     Syncope and collapse 2024     Fall 2024     Type 2 diabetes mellitus with microalbuminuria, without long-term current use of insulin ([facility]) 2024     Paroxysmal A-fib ([facility]) 2024     Ambulatory dysfunction 2024     Hyponatremia 2024     Left renal mass 2024     Cough 2024     Bronchiectasis without complication ([facility]) 2024     Multiple thyroid nodules 2024     Abnormal CT of the chest 2023     S/P revision of total knee, left 2023    Nonrheumatic mitral valve regurgitation 2023     Acute on chronic heart failure ([facility]) 2022     Elevated serum creatinine 2022     Meningioma ([facility]) 2022     Chronic tension-type headache, not intractable 2022     Vasomotor rhinitis 2021     MGUS (monoclonal gammopathy of unknown significance) 2021     Hurthle cell adenoma of thyroid 2021     Tremors of nervous system 2021     Hx of diplopia 2021     S/P placement of cardiac pacemaker 2021     History of sick sinus syndrome s/p PPM 2021     Current use of long term anticoagulation 2021     Malignant neoplasm of thyroid gland (HCC) 2021     Chronic heart failure with preserved ejection fraction ([facility]) 2021     Goals of care, counseling/discussion 2020     Chronic  rhinitis 11/10/2020     Arthritis of both acromioclavicular joints 03/06/2020     Carpal tunnel syndrome on right 11/01/2019     Osteoarthritis of shoulder      TMJ syndrome 10/18/2017     Essential hypertension 04/19/2016     Peripheral neuropathy 03/07/2016     Lumbar spondylosis 06/29/2015     Lumbar stenosis 06/29/2015     Restless legs syndrome 07/09/2014     Allergic rhinitis 04/01/2013     Osteoarthritis of knee 04/01/2013     Insomnia 01/04/2013     Osteopenia 11/26/2012     Fibromyalgia 10/16/2012     Hyperlipidemia 10/16/2012     Osteoarthrosis, hand 10/16/2012     Vitamin D deficiency 10/16/2012       LOS (days): 7  Geometric Mean LOS (GMLOS) (days): 3.9  Days to GMLOS:-3.1     OBJECTIVE:  Risk of Unplanned Readmission Score: 41.39         Current admission status: Inpatient   Preferred Pharmacy:   [id number] - [place] - [address]  [address] [facility]  [place] [address]  Phone: [phone] Fax: [phone]    [facility] [address] [place] [address]  [address] [facility]  [address]  [facility] [address]  Phone: [phone] Fax: [phone]    Primary Care Provider: Naveen Monsivais MD    Primary Insurance: MEDICARE  Secondary Insurance: [facility]    DISCHARGE DETAILS:                                                                                     IMM reviewed with patient and caregiver, patient and caregiver agrees with discharge determination.  IMM Given (Date):: 08/13/24  IMM Given to:: Patient  Family notified:: Patient and pt's coy Quick

## 2024-08-13 NOTE — CONSULTS
[facility]  Consult  Name: Farnaz Martin 79 y.o. female I MRN: [id number]  Unit/Bed#: S -01 I Date of Admission: 8/6/2024   Date of Service: 8/13/2024 I Hospital Day: 7    Inpatient consult to Palliative Care  Consult performed by: Sandoval Barger MD  Consult ordered by: Steve Tripathi MD          Assessment & Plan   Palliative care patient  Assessment & Plan  Social Support:  Supportive listening provided  Normalized experience of patient/family  Provided anxiety containment     Follow-up  We appreciate the opportunity to participate in this patient's care.   We will continue to follow. [facility] to follow up.  Please do not hesitate to contact our on-call provider through our clinic answering service at [phone] should there be an acute change or other symptom control concerns.             Decisional apparatus:  Patient with capacity to make medical decisions on my exam today. If such capacity is lost, patient's substitute decision maker would default Coral Garrison patients daughter.           Advance Directive / Living Will / POLST:  None on file - Coral Garrison is legal and medical POA and this was reiterated by patient bedside.      I have reviewed the patient's controlled substance dispensing history in the Prescription Drug Monitoring Program in compliance with the [facility] regulations before prescribing any controlled substances.    Recurrent pleural effusion on right  Assessment & Plan   Recurrent right-sided pleural effusion:     Planning on palliative thoracentesis prior to discharge possible plurex cath     Paroxysmal A-fib (HCC)  Assessment & Plan    SP Albation 2021  PLAN:  Apixaban 5mg PO BID  Flecainede 150mg PO BID  Metoprolol 75mg PO BID  Diltiazem 120mg PO TID  Continue diltiazem 120 mg daily3.    Cough  Assessment & Plan    ANTI TUSSIVE:   Benzonatate 100mg PO q 3hrs PRN  Dextromethorphan-Guaifenesin PO Syrup 10ml PRN         Elevated serum creatinine  Assessment  & Plan  Recent Labs     08/11/24  0605 08/12/24  0620 08/13/24  0449   CREATININE 1.35* 1.44* 1.68*   EGFR 37 34 28       Estimated Creatinine Clearance: 25.2 mL/min (A) (by C-G formula based on SCr of 1.68 mg/dL (H)).  POA 1.38; (baseline 1.1-1.28)     Plan:  PO Bumex 2mg twice daily  Monitor BMP, IV fluids held  Avoid hypoperfusion of the kidneys, minimize nephrotoxins  Currently at baseline, continue to monitor with daily labs       Goals of care, counseling/discussion  Assessment & Plan  Goals:   Patient is still requiring oxygen and easily out of breath but mentation in at baseline.   Patient does confirm that Coral Garrison is medical and legal POA and would be his healthcare agent in the event he is unable to make his own decisions.  Patient and family a little frustrated that heart failure team did not see them as they were told on Monday 12AUG24 and that the prognosis timeline has not been covered.   Goal remains focused on disease directed cares at this time.  Palliative will follow for ongoing goals of care discussions as situation evolves.  Serious illness conversation completed in the room with patient and daughter and son in law       * Acute on chronic heart failure (HCC)  Assessment & Plan  Wt Readings from Last 3 Encounters:   08/13/24 74 kg (163 lb 2.3 oz)   07/29/24 72 kg (158 lb 12.8 oz)   07/28/24 71.9 kg (158 lb 8.2 oz)     Lab Results   Component Value Date    BNP 1,091 (H) 08/06/2024    BNP 1,428 (H) 07/24/2024    LVEF 50 07/14/2024           Baseline O2 2L  On admission 06AUG24 pt presented w/ acute on chronic respiratory failure w/ hypoxia,  requireing nebs 4L NC oxygen Acute and chronic respiratory failure with hypoxia 2/2 CHF exacerbation a/e/b hypoxia, increased oxygen demands requiring CXR, Nebs, PO bumex and 4L NC oxygen.   SXS: 4 day hx of SOB, dyspnea on exertion, productive cough, weight gain, lower extremity edeam. Could not complete sentences at rest, orthopnea  Recent admission  started on 10mg Torsemide PO Daily     [id number] XR chest portable  Airspace disease and pleural effusion on the right.   [id number] XR chest portable  Persistent large right pleural effusion with right base atelectasis. Pneumonia not excluded in the appropriate clinical setting.   58SBY26 XR chest portable  No significant change of pleural effusions, pulmonary opacities, or cardiomediastinal silhouette   [id number] XR chest 2 views  Mild pulmonary edema, improved. Grossly stable right greater than left pleural effusions.  [id number] ECHO    Left Ventricle: Left ventricular cavity size is normal. Wall thickness is normal. The left ventricular ejection fraction is 55%. Systolic function is normal. Wall motion is normal.    Right Ventricle: Right ventricular cavity size is normal. Systolic function is normal. A pacer wire is present.    Aortic Valve: There is trace regurgitation.    Mitral Valve: There is moderate to severe regurgitation.    Tricuspid Valve: There is mild to moderate regurgitation. The right ventricular systolic pressure is severely elevated. The estimated right ventricular systolic pressure is 66.00 mmHg.    Pericardium: There is a left pleural effusion.    Prior TTE study available for comparison. Prior study date: 2023. Changes noted when compared to prior study. Changes include: Mitral regurgitation has worsened.      Plan:  Cardiology on board  Bumex 2mg PO BID  Toprolo 75mg PO BID  Cardiac diet  Symptomatic thoracentesis w/ possible plurex prior to discharge   Currently patient is volume overloaded with intravascular depletion which has been difficult to balance             IDENTIFICATION:  Inpatient consult to Palliative Care  Consult performed by: Sandoval Barger MD  Consult ordered by: Steve Tripathi MD           Physician Requesting Consult: Gladis Pabon MD  Date of admission:  Reason for Consult / Principal Problem: GOC counseling and SM secondary to HFpEF, CKD III            History of Present Illness:  Farnaz Martin is a 79 y.o. female who presents with a palliative diagnosis of CKDIII HFpEF Basal Cell Carcinoma, Malignant neoplasm of thyroid gland, MGUS  was brought into the ED at [facility] on 06AUG24 secondary to Acute respiratory failure with hypoxia.   Pt w/ recent readmissions for SOB 2/2 to pleural effusions suspected to be 2/2 to HFpEF. Pt endorsed a 3 day hx of dyspnea, cough, weight gain, orthopnea, pitting edema, fatigue and presyncope.  At night pt unable to breath when lying flat. Baseline dry anusha 72kg was 76 kg on admission.    ED imaging suggested of pulm edema bilateral pleural effusions. Since admission cardiology and pulmonology have been consulted and there is discussion of palliative thoracentesis with potential plurex cath.            Review of Systems   Constitutional:  Positive for activity change.   Respiratory:  Positive for apnea, cough and shortness of breath.    Neurological:  Positive for light-headedness.            Medical History        Past Medical History:   Diagnosis Date    Actinic keratosis       last assessed - 06Jun2014    Arthritis      Atrial fibrillation with rapid ventricular response (HCC)       last assessed - 26Apr2016    Atrial fibrillation with rapid ventricular response (HCC) 04/19/2016    Basal cell carcinoma      Benign colon polyp       last assessed - 27Apr2015    Bronchiectasis without complication (HCC)      CHF (congestive heart failure) (HCC) 06/2022    Chronic diastolic CHF (congestive heart failure) (HCC)      Disease of thyroid gland      Effusion of knee joint right       Resolved - 19Apr2016    Esophageal reflux      Fibromyalgia       last assessed - 27Dec2017    Fibromyalgia, primary      GERD (gastroesophageal reflux disease)      Hyperlipidemia      Hypertension      Hyponatremia      Malignant neoplasm of thyroid gland (HCC)      Meningioma (HCC)      MGUS (monoclonal gammopathy of unknown significance)       Palpitations       last assessed - [id number]    Peroneal tendonitis, right       last assessed - [id number]    Right lumbar radiculopathy 03/17/2016    Thyroid cancer (HCC)      Thyroid nodule      Trochanteric bursitis of left hip 03/09/2018         Surgical History         Past Surgical History:   Procedure Laterality Date    CARDIAC ELECTROPHYSIOLOGY STUDY AND ABLATION   08/2022    CATARACT EXTRACTION Bilateral      COLONOSCOPY   03/2018    COLONOSCOPY W/ POLYPECTOMY   2021     repeat in 5 years    EYE SURGERY        IR THORACENTESIS   7/27/2024    OOPHORECTOMY        DC NEUROPLASTY &/TRANSPOS MEDIAN NRV CARPAL TUNNE Right 11/14/2019     Procedure: RELEASE CARPAL TUNNEL;  Surgeon: Onesimo Tate MD;  Location: [facility];  Service: Orthopedics    DC TOTAL THYROID LOBECTOMY UNI W/WO ISTHMUSECTOMY Left 12/16/2020     Procedure: Left THYROID LOBECTOMY;  Surgeon: Jhony Bhatt MD;  Location: [facility];  Service: ENT    RECTAL POLYPECTOMY        REPLACEMENT TOTAL KNEE Left       last assessed - 27Apr2015    TONSILLECTOMY        TOTAL ABDOMINAL HYSTERECTOMY        TUBAL LIGATION        US GUIDED THYROID BIOPSY   10/14/2020         Social History               Socioeconomic History    Marital status:        Spouse name: Not on file    Number of children: Not on file    Years of education: Not on file    Highest education level: Not on file   Occupational History    Not on file   Tobacco Use    Smoking status: Never    Smokeless tobacco: Never   Vaping Use    Vaping status: Never Used   Substance and Sexual Activity    Alcohol use: Not Currently    Drug use: Never    Sexual activity: Not Currently   Other Topics Concern    Not on file   Social History Narrative     ** Merged History Encounter **            Social Determinants of Health           Financial Resource Strain: Patient Unable To Answer (12/19/2023)     Overall Financial Resource Strain (CARDIA)      Difficulty of Paying Living Expenses: Patient unable to  answer   Food Insecurity: No Food Insecurity (7/25/2024)     Hunger Vital Sign      Worried About Running Out of Food in the Last Year: Never true      Ran Out of Food in the Last Year: Never true   Transportation Needs: No Transportation Needs (7/25/2024)     PRAPARE - Transportation      Lack of Transportation (Medical): No      Lack of Transportation (Non-Medical): No   Physical Activity: Not on file   Stress: Not on file   Social Connections: Not on file   Intimate Partner Violence: Not At Risk (7/31/2023)     [facility], [facility]     Humiliation, Afraid, Rape, and Kick questionnaire      Fear of Current or Ex-Partner: No      Emotionally Abused: No      Physically Abused: No      Sexually Abused: No   Housing Stability: Low Risk  (7/25/2024)     Housing Stability Vital Sign      Unable to Pay for Housing in the Last Year: No      Number of Times Moved in the Last Year: 0      Homeless in the Last Year: No         Family History         Family History   Problem Relation Age of Onset    Heart disease Mother      Diabetes Mother      Heart disease Father      Coronary artery disease Father      Stroke Father           cerebrovascular accident    Heart attack Father           myocardial infarction    Sudden death Father           scd    Other Family           Back disorder    Coronary artery disease Family      Neuropathy Family      Osteoporosis Family      No Known Problems Daughter      No Known Problems Maternal Grandmother      No Known Problems Maternal Grandfather      No Known Problems Paternal Grandmother      No Known Problems Paternal Grandfather      Cancer Maternal Uncle      Breast cancer Maternal Aunt 65    No Known Problems Son      No Known Problems Maternal Aunt      No Known Problems Maternal Aunt      No Known Problems Maternal Aunt      No Known Problems Paternal Aunt      No Known Problems Paternal Aunt      Anuerysm Neg Hx      Clotting  disorder Neg Hx      Arrhythmia Neg Hx      Heart failure Neg Hx              Medications:  all current active meds have been reviewed and current meds:   Current Medications          Current Facility-Administered Medications   Medication Dose Route Frequency    acetaminophen (TYLENOL) tablet 650 mg  650 mg Oral Q8H PRN    albuterol (PROVENTIL HFA,VENTOLIN HFA) inhaler 2 puff  2 puff Inhalation Q6H PRN    apixaban (ELIQUIS) tablet 5 mg  5 mg Oral BID    ascorbic acid (VITAMIN C) tablet 500 mg  500 mg Oral Daily    benzonatate (TESSALON PERLES) capsule 100 mg  100 mg Oral TID PRN    Cholecalciferol (VITAMIN D3) tablet 1,000 Units  1,000 Units Oral Daily    dextromethorphan-guaiFENesin (ROBITUSSIN DM) oral syrup 10 mL  10 mL Oral Q4H PRN    diltiazem (CARDIZEM CD) 24 hr capsule 120 mg  120 mg Oral Daily    ezetimibe (ZETIA) tablet 10 mg  10 mg Oral HS    famotidine (PEPCID) tablet 20 mg  20 mg Oral BID    flecainide (TAMBOCOR) tablet 150 mg  150 mg Oral BID    gabapentin (NEURONTIN) capsule 300 mg  300 mg Oral HS    insulin glargine (LANTUS) subcutaneous injection 10 Units 0.1 mL  10 Units Subcutaneous HS    insulin lispro (HumALOG/ADMELOG) 100 units/mL subcutaneous injection 1-5 Units  1-5 Units Subcutaneous TID AC    levalbuterol (XOPENEX) inhalation solution 1.25 mg  1.25 mg Nebulization TID    loratadine (CLARITIN) tablet 10 mg  10 mg Oral Daily    metoprolol succinate (TOPROL-XL) 24 hr tablet 75 mg  75 mg Oral Q12H    potassium chloride (Klor-Con M20) CR tablet 20 mEq  20 mEq Oral BID    rOPINIRole (REQUIP) tablet 2 mg  2 mg Oral HS    zolpidem (AMBIEN) tablet 10 mg  10 mg Oral HS            Allergies         Allergies   Allergen Reactions    Sotalol Other (See Comments)       Prolonged QTc leading to torsades de pointes     Penicillins Other (See Comments)       As a child calcium deposit in the arm     Ace Inhibitors GI Intolerance       Did feel well on it    Omeprazole Abdominal Pain, Rash and Vomiting        "stomach pain, vomiting, rash            Objective:  /86   Pulse 60   Temp 98 °F (36.7 °C) (Oral)   Resp 18   Wt 74 kg (163 lb 2.3 oz)   LMP 02/01/1990 (Within Weeks)   SpO2 (!) 85%   BMI 29.84 kg/m²      Physical Exam  Vitals and nursing note reviewed.   Constitutional:       Appearance: She is ill-appearing.   HENT:      Nose: Nose normal.   Eyes:      General: No scleral icterus.        Right eye: No discharge.         Left eye: No discharge.      Conjunctiva/sclera: Conjunctivae normal.   Cardiovascular:      Rate and Rhythm: Bradycardia present.   Pulmonary:      Effort: Respiratory distress present.      Breath sounds: Wheezing and rales present.   Neurological:      Mental Status: She is oriented to person, place, and time.   Psychiatric:         Behavior: Behavior normal.               Lab Results: I have personally reviewed pertinent labs., CBC: No results found for: \"WBC\", \"HGB\", \"HCT\", \"MCV\", \"PLT\", \"ADJUSTEDWBC\", \"RBC\", \"MCH\", \"MCHC\", \"RDW\", \"MPV\", \"NRBC\", CMP:         Lab Results   Component Value Date     SODIUM 134 (L) 08/13/2024     K 5.2 08/13/2024     CL 97 08/13/2024     CO2 30 08/13/2024     BUN 45 (H) 08/13/2024     CREATININE 1.68 (H) 08/13/2024     CALCIUM 9.3 08/13/2024     EGFR 28 08/13/2024   , BMP:        Lab Results   Component Value Date     SODIUM 134 (L) 08/13/2024     K 5.2 08/13/2024     CL 97 08/13/2024     CO2 30 08/13/2024     BUN 45 (H) 08/13/2024     CREATININE 1.68 (H) 08/13/2024     GLUC 135 08/13/2024     CALCIUM 9.3 08/13/2024     AGAP 7 08/13/2024     EGFR 28 08/13/2024   , PT/PTT:No results found for: \"PT\", \"PTT\"  Imaging Studies: I have personally reviewed pertinent reports.  EKG, Pathology, and Other Studies: I have personally reviewed pertinent reports.     Counseling / Coordination of Care  Total floor / unit time spent today 60  minutes. Greater than 50% of total time was spent with the patient and / or family counseling and / or coordination of care. A " description of the counseling / coordination of care: Reviewed chart,  provided medical updates, determined goals of care, discussed palliative care and symptom management, discussed comfort care and hospice care, discussed code status, provided anticipatory guidance, determined competency and POA/HCA, determined social/family support, provided psychosocial support. Reviewed with SLIM, ICU team, Hospice Liaison, RN and CM.

## 2024-08-13 NOTE — ASSESSMENT & PLAN NOTE
Pt endorsing orthopnea, SOB  Productive in nature without production of sputum  Uses Spiriva inhaler and Robitussin at home for sx's  R/p chest x-ray shows increased congestion, persistent pleural effusion that is worsening on chest x-rays  Cough component likely CHF exacerbation  Pulmonology was consulted, recommended no procedural interventions at this time, but recommended   -Continue XOPENEX inhalation solution 1.25 mg   -Continue home inhalers and nebulizer treatment on discharge.   -Possible home oxygen evaluation     Plan:  Atrovent neb four times daily  XOPENEX inhalation solution 1.25 mg   Continue home inhalers and nebulizer treatment on discharge.   Possible home oxygen evaluation   Thoracocentesis performed, 1300 mL clear yellow fluid drained from right pleural space.

## 2024-08-13 NOTE — DISCHARGE INSTRUCTIONS
How to Care for Your Chest or Abdominal Catheter                                    Post ASEPT Catheter Placement                 WHAT YOU NEED TO KNOW:                 A chest catheter helps remove fluid from your chest . This may decrease symptoms such as shortness of breath, pain,. One end of the catheter sits inside your  chest. The other end sits outside of your body. Your healthcare provider will show you or your caregiver how to drain fluid through the catheter. Your supplies will be shipped to your home.                                                     DISCHARGE INSTRUCTIONS:   How often you should drain fluid:   After the initial insertion drain every other day x 3 days then for comfort of SOB) Resume your normal diet.Small sips of flat soda will help with nausea. Resume taking your medications. You may have some initial  discomfort at the insertion site. You can take your normal pain medication.     [place], [place] and [place]  Patients Contact Interventional Radiology at [phone]    [facility] PATIENTS: Contact Interventional Radiology at [phone])       [facility] PATIENTS: Contact Interventional Radiology at [phone]) if any of the following occur:                              Contact your healthcare provider if:   Your symptoms, such as pain or shortness of breath, do not get better after you drain fluid.   You have redness, pain, or pus where the catheter is located.   You have a fever.   Persistent nausea or vomiting.  You cannot drain fluid.   You see a change in the color of fluid that you drain.   You see fluid leaking from around your catheter.   You drain foul-smelling fluid or bloody fluid from your catheter.   You see a hole or tear in your catheter and need to use the emergency clip.   You have questions or concerns about your condition or care.    How often you should drain fluid:  Your healthcare provider will tell you how often  to drain fluid. He may tell you to follow a schedule, such as draining once a day or once every other day. He may instead tell you to drain fluid when you have symptoms such as SOB pain.   Before you drain fluid from your catheter:   Wash your hands.  Remove all rings. Use soap and water or an alcohol-based hand rub. This will help prevent an infection.     Gather your supplies.  Get a drainage kit and place it on a firm, clean surface. Drainage kits contain either a 500 mL or 1000 mL bottle and a procedure kit. Your healthcare provider will tell you which bottle to use.     Gently remove the old bandage.  Do not pull on the drain when you remove the bandage. Throw the bandage away.     Wash your hands a second time.  Use soap and water or an alcohol-based hand rub.     Open the drainage kit and remove the procedure kit.  Remove the bandage and place it on your clean surface. Leave the bottle with drainage tubing in the package. Remove the procedure kit and place it on your clean surface with the folded side facing up. Carefully unfold the corners of the procedure kit. Do not touch anything inside of the procedure kit. Everything inside of the procedure kit is sterile.     Prepare the drainage tubing and bottle.  Uncoil the drainage tubing that is connected to the bottle. Do not touch the end of the drainage tubing. Do not remove the cover from the end of the drainage tubing. Lay the drainage tubing on the procedure kit.     Put on gloves.  Both gloves fit either hand.  each glove up by the folded part and put them on. Do not touch any part of the outside of the gloves with your bare hand. Do not let them touch anything that is not sterile.     Open the smaller packages inside of the procedure kit.  Open the package that contains the new catheter cap. Let the cap gently fall onto the procedure kit. Tear open the alcohol pads, but do not remove them from their pouches.     Squeeze or roll the clamp closed on  the drainage tubing.  If the clamp rolls, roll it towards the marianne    Clean the end of the catheter.  Use 1 alcohol pad from the procedure kit. Wipe around the end of the catheter in circles. Do not put anything into the end of the catheter to clean it. This could damage the catheter.  How to drain fluid from your catheter:   Connect the end of your catheter to the drainage tubing.  Remove the cover from the end of the drainage tubing. Hold the end of your catheter in one hand and the drainage tubing in your other hand. Insert the drainage tubing into your catheter until you hear or feel a click.     Remove the lock clip on the bottle.  Twist and pull gently to remove     Open the clamp on the drainage tubing.  This will start the flow of fluid.     Drain as much fluid as directed by your doctor.  To make the fluid drain slower, roll the clamp toward the bottle or gently squeeze the clamp. To make the fluid drain faster, slide the clamp away from the bottle or slowly release the clamp. It is normal to feel pain or cough when fluid is drained. To decrease pain or coughing, slow down the flow of fluid. You can also close the clamp and take a break. If your symptoms do not get better when you stop draining, do not continue to drain fluid.     Change the bottle when it is full.  If you need to use a second bottle, close the clamp on the drainage tubing. This will stop fluid from draining. Hold the end of your catheter. Pull out the end of the drainage tubing from your catheter. Do not let the end of your catheter touch anything. Remove the cap on the end of the new drainage tubing. Insert the drainage tubing into your catheter. Remove the support clip on the bottle. Push down on the bottle plunger. Open the clamp on the drainage tubing. Continue to remove as much fluid as directed.     Close the clamp on the drainage tubing to stop fluid from draining.  Check that the clamp is completely closed.     Pull out the end of  the drainage tubing from your catheter.  Do not let the end of your catheter touch anything.     Clean the end of your catheter with a new alcohol pad.  This will help prevent infection.     After you drain fluid from your catheter:   Clean the skin around your catheter with a new alcohol pad.  Start closest to where the catheter is inserted in your skin. Move the alcohol pad in circles away from your catheter.     Place the foam pad around your catheter.  The foam pad should have a small cut in it. This cut lets you fit the foam pad around your catheter.     Loop the end of the catheter.  Place the looped catheter on top of the foam pad. Hold it on top of the foam pad. Place the gauze from your procedure kit over the catheter. Make sure the catheter is completely covered by the gauze.     Remove your gloves.  This will make it easier to handle the clear sticky bandage.     Place the sticky bandage over the gauze.  There are 3 layers you need to remove from the bandage. Remove the larger white layer from the bandage. Place the bandage over your gauze so the gauze is in the center of the bandage. Hold one corner of the bandage and remove the larger clear layer of the bandage. Remove the last white layer of the bandage. Press gently on all corners of the bandage to help it stick to your skin.     Write down the amount of fluid you drained.  There are measurements on the side of the bottle. Also write down the color of the fluid, the date, and the time. Bring this record with you to your follow-up visits.     Other important information:   If your catheter comes out, cover the opening in your skin with sterile gauze and a bandage. Use the sterile gauze and bandage from your drainage kit.   Do not take a bath or swim in hot tubs or pools. This can cause an infection.   You can shower or take a sponge bath. Make sure your bandage covers your catheter before you bathe. The edges of the bandage should make contact with  your skin on all sides. This will prevent water from getting into your drain. If your bandage gets wet, remove the bandage. Clean your skin around the catheter and replace the bandage as directed.  Follow up with your healthcare provider as directed:  Write down your questions so you remember to ask them during your visits.   © 2017 [employer]. Information is for End User's use only and may not be sold, redistributed or otherwise used for commercial purposes. All illustrations and images included in CareNotes® are the copyrighted property of [employer] or [employer].  The above information is an  only. It is not intended as medical advice for individual conditions or treatments. Talk to your doctor, nurse or pharmacist before following any medical regimen to see if it is safe and effective for you.

## 2024-08-13 NOTE — SEDATION DOCUMENTATION
1300 mL clear yellow fluid drained from right pleural space. Pt tolerated well.  Band aid to site.

## 2024-08-13 NOTE — ASSESSMENT & PLAN NOTE
Wt Readings from Last 3 Encounters:   24 78.6 kg (173 lb 4.5 oz)   24 72 kg (158 lb 12.8 oz)   24 71.9 kg (158 lb 8.2 oz)     Lab Results   Component Value Date    BNP 1,091 (H) 2024    BNP 1,428 (H) 2024    LVEF 50 2024           Baseline O2 2L  On admission [id number] pt presented w/ acute on chronic respiratory failure w/ hypoxia,  requireing nebs 4L NC oxygen Acute and chronic respiratory failure with hypoxia 2/2 CHF exacerbation a/e/b hypoxia, increased oxygen demands requiring CXR, Nebs, PO bumex and 4L NC oxygen.   SXS: 4 day hx of SOB, dyspnea on exertion, productive cough, weight gain, lower extremity edeam. Could not complete sentences at rest, orthopnea  Recent admission started on 10mg Torsemide PO Daily     [id number] XR chest portable  Airspace disease and pleural effusion on the right.   [id number] XR chest portable  Persistent large right pleural effusion with right base atelectasis. Pneumonia not excluded in the appropriate clinical setting.   [id number] XR chest portable  No significant change of pleural effusions, pulmonary opacities, or cardiomediastinal silhouette   [id number] XR chest 2 views  Mild pulmonary edema, improved. Grossly stable right greater than left pleural effusions.  [id number] ECHO    Left Ventricle: Left ventricular cavity size is normal. Wall thickness is normal. The left ventricular ejection fraction is 55%. Systolic function is normal. Wall motion is normal.    Right Ventricle: Right ventricular cavity size is normal. Systolic function is normal. A pacer wire is present.    Aortic Valve: There is trace regurgitation.    Mitral Valve: There is moderate to severe regurgitation.    Tricuspid Valve: There is mild to moderate regurgitation. The right ventricular systolic pressure is severely elevated. The estimated right ventricular systolic pressure is 66.00 mmHg.    Pericardium: There is a left pleural effusion.    Prior TTE study available for  comparison. Prior study date: 12/5/2023. Changes noted when compared to prior study. Changes include: Mitral regurgitation has worsened.      Plan:  Cardiology on board  Bumex 2mg PO BID  Toprolo 75mg PO BID  Cardiac diet  Symptomatic thoracentesis w/ possible plurex prior to discharge   Currently patient is volume overloaded with intravascular depletion which has been difficult to balance

## 2024-08-13 NOTE — ASSESSMENT & PLAN NOTE
Goals:   Patient is still requiring oxygen and easily out of breath but mentation in at baseline.   Patient does confirm that Coral Garrison is medical and legal POA and would be his healthcare agent in the event he is unable to make his own decisions.  Patient and family a little frustrated that heart failure team did not see them as they were told on Monday [id number] and that the prognosis timeline has not been covered.   Goal remains focused on disease directed cares at this time.  Palliative will follow for ongoing goals of care discussions as situation evolves.  Serious illness conversation completed in the room with patient and daughter and son in law

## 2024-08-13 NOTE — PROGRESS NOTES
General Cardiology   Progress Note -  Team One   Farnaz Martin 79 y.o. female MRN: [id number]    Unit/Bed#: S -01 Encounter: [id number]    Assessment  1. Acute on chronic HFpEF  -BNP level 1091  -Outpatient diuretic:torsemide 10 mg daily.  -Inpatient diuretic regimen;S/p course of IV diuresis, transitioned to oral Bumex 2 mg on 8/12  -24-hour I&O balance; +150 ml, overall -3.9L  -Weight: 160 lbs on 8/11, no SS weight recorded since. Requested RN to obtain SS weight now.   -Dry weight: recently 158-160 lbs  2. Recurrent right sided pleural effusion  -Pulmonary following, plan to perform thoracentesis today.   -S/p prior thoracentesis procedures.   -CXR in the ED demonstrated mild pulmonary edema, stable R>L pleural effusions  -Repeat CXR 8/12 - large right sided pleural effusion.  3. Paroxysmal atrial fibrillation/flutter and PAT  4. SSS, PPM in situ.  -Prior atrial fibrillation/flutter ablation in the past.  -ECG on admission demonstrated AV paced rhythm.  -Device interrogation 6/17/2024; AT/AF BURDEN SINCE 5/24/24-2.4%.   -On Cardizem  mg daily, flecainide 150 mg twice daily, and metoprolol succinate 75 mg BID  -On Eliquis 5 mg BID  5. HTN  -BP last recorded at 129/86  -On Cardizem  mg daily, metoprolol succinate 75 mg BID  6. HLD  -Lipid profile 7/25/2024; cholesterol 79, triglyceride 55, HDL 28 and LDL calculated 40.  -On Zetia 10 mg daily.  7. Type 2 DM- A1c 8.8%  8. CKD stage III  -Baseline creatinine 0.8-1.1, recent discharge (7/29) creat level was 1.29  -Creat 1.38 on admission, currently 1.68     Plan  -Pt feels tired this a.m; she is more short of breath as of the past 24 hrs hrs. Currently back on supplemental at 3L w/stable O2 sats.   -Pulmonary team following, planning for thoracentesis today +/- right sided pleurx catheter  -BUN/creat levels slowly up trending, concerning for over diuresis/dehydration. I would recommend holding diuretics for now . Hold potassium.   -Continue  Cardizem  mg daily, flecainide 150 mg twice daily, and metoprolol succinate 75 mg BID  -Continue Zetia 10 mg daily  -Continue Eliquis, okay to hold this a.m if needed for procedural intervention.   -No indication for telemetry.     Subjective  Review of Systems   Constitutional: Negative for chills, fever and malaise/fatigue.   Eyes:  Negative for visual disturbance.   Cardiovascular:  Negative for chest pain, dyspnea on exertion, leg swelling, orthopnea and palpitations.       Objective:   Physical Exam  Vitals and nursing note reviewed.   Constitutional:       General: She is not in acute distress.     Appearance: She is not diaphoretic.   HENT:      Head: Normocephalic and atraumatic.   Eyes:      General: No scleral icterus.  Cardiovascular:      Rate and Rhythm: Normal rate and regular rhythm.      Pulses: Normal pulses.      Heart sounds: Normal heart sounds.   Pulmonary:      Effort: Pulmonary effort is normal.      Breath sounds: Normal breath sounds. No wheezing.      Comments: RLL lung fields very diminished.   Abdominal:      Palpations: Abdomen is soft.   Musculoskeletal:      Right lower leg: No edema.      Left lower leg: No edema.   Skin:     General: Skin is warm and dry.      Capillary Refill: Capillary refill takes less than 2 seconds.   Neurological:      General: No focal deficit present.      Mental Status: She is alert and oriented to person, place, and time.   Psychiatric:         Mood and Affect: Mood normal.         Vitals: Blood pressure 129/86, pulse 60, temperature 98 °F (36.7 °C), temperature source Oral, resp. rate 18, weight 78.6 kg (173 lb 4.5 oz), last menstrual period 1990, SpO2 (!) 85%, not currently breastfeeding.,     Body mass index is 31.69 kg/m².,   Systolic (24hrs), Av , Min:99 , Max:129     Diastolic (24hrs), Av, Min:53, Max:91      Intake/Output Summary (Last 24 hours) at 2024 1007  Last data filed at 2024 0901  Gross per 24 hour   Intake 240  ml   Output 350 ml   Net -110 ml     Weight (last 2 days)       Date/Time Weight    08/13/24 0600 78.6 (173.28)    08/12/24 0600 77.8 (171.52)    08/11/24 0600 73 (160.94)            LABORATORY RESULTS      CBC with diff:   Results from last 7 days   Lab Units 08/12/24  0620 08/11/24  0605 08/10/24  0333 08/09/24  0630 08/08/24  0546 08/07/24  0528 08/06/24  1405   WBC Thousand/uL 7.53 5.64 5.55 5.72 6.23 5.70 5.89   HEMOGLOBIN g/dL 11.8 11.0* 10.6* 10.5* 11.1* 10.7* 11.5   HEMATOCRIT % 37.3 34.5* 33.5* 33.0* 34.7* 33.3* 36.3   MCV fL 90 90 91 91 90 91 91   PLATELETS Thousands/uL 242 237 208 225 255 238 267   RBC Million/uL 4.14 3.82 3.70* 3.64* 3.84 3.66* 3.98   MCH pg 28.5 28.8 28.6 28.8 28.9 29.2 28.9   MCHC g/dL 31.6 31.9 31.6 31.8 32.0 32.1 31.7   RDW % 14.6 14.6 14.3 14.4 14.5 14.5 14.6   MPV fL 10.7 10.6 10.0 10.3 10.5 10.1 10.2   NRBC AUTO /100 WBCs 0 0 0 0 0 0 0       CMP:  Results from last 7 days   Lab Units 08/13/24  0449 08/12/24  0620 08/11/24  0605 08/10/24  0333 08/09/24  0630 08/08/24  0546 08/07/24  0528 08/06/24  1405   POTASSIUM mmol/L 5.2 4.7 4.0 3.6 3.3* 3.6 3.5 4.3   CHLORIDE mmol/L 97 98 98 100 98 98 98 98   CO2 mmol/L 30 28 27 28 30 29 30 29   BUN mg/dL 45* 39* 38* 34* 36* 35* 31* 31*   CREATININE mg/dL 1.68* 1.44* 1.35* 1.24 1.38* 1.39* 1.32* 1.38*   CALCIUM mg/dL 9.3 9.2 9.0 8.9 8.8 9.0 8.8 9.0   AST U/L  --   --   --   --   --   --   --  36   ALT U/L  --   --   --   --   --   --   --  39   ALK PHOS U/L  --   --   --   --   --   --   --  70   EGFR ml/min/1.73sq m 28 34 37 41 36 35 38 36       BMP:  Results from last 7 days   Lab Units 08/13/24  0449 08/12/24  0620 08/11/24  0605 08/10/24  0333 08/09/24  0630 08/08/24  0546 08/07/24  0528   POTASSIUM mmol/L 5.2 4.7 4.0 3.6 3.3* 3.6 3.5   CHLORIDE mmol/L 97 98 98 100 98 98 98   CO2 mmol/L 30 28 27 28 30 29 30   BUN mg/dL 45* 39* 38* 34* 36* 35* 31*   CREATININE mg/dL 1.68* 1.44* 1.35* 1.24 1.38* 1.39* 1.32*   CALCIUM mg/dL 9.3 9.2 9.0 8.9  8.8 9.0 8.8       Lab Results   Component Value Date    NTBNP 761 (H) 2021    NTBNP 2,773 (H) 2021    NTBNP 506 (H) 2019        Results from last 7 days   Lab Units 24  0605 08/10/24  0333   MAGNESIUM mg/dL 1.9 1.9                         Lipid Profile:   Lab Results   Component Value Date    CHOL 205 2015    CHOL 195 07/10/2014     Lab Results   Component Value Date    HDL 28 (L) 2024    HDL 46 (L) 2024    HDL 53 2023     Lab Results   Component Value Date    LDLCALC 40 2024    LDLCALC 64 2024    LDLCALC 96 2023     Lab Results   Component Value Date    TRIG 55 2024    TRIG 63 2024    TRIG 68 2023       Cardiac testing:   Results for orders placed during the hospital encounter of 21    Echo complete with contrast if indicated    [address] [id number] [phone]    Transthoracic Echocardiogram  2D, M-mode, Doppler, and Color Doppler    Study date:  2021    Patient: TANISHA LEVINE  MR number: [id number]  Account number: [id number]  : 1945  Age: 75 years  Gender: Female  Status: Inpatient  Location: Bedside  Height: 62 in  Weight: 151.6 lb  BP: 126/ 94 mmHg    Indications: Atrial fibrillation    Diagnoses: I48.0 - Atrial fibrillation    Sonographer:  IGNACIO Cameron  Primary Physician:  Naveen Monsivais MD  Referring Physician:  Shantal Huff MD  Group:  [facility]  Interpreting Physician:  Kwabena Seymour MD    SUMMARY    LEFT VENTRICLE:  Systolic function was normal. Ejection fraction was estimated to be 55 %.  There were no regional wall motion abnormalities.  Wall thickness was at the upper limits of normal.  Doppler parameters were consistent with elevated ventricular end-diastolic filling pressure.    LEFT ATRIUM:  The atrium was mildly to moderately dilated.    RIGHT ATRIUM:  The atrium was mildly dilated.    MITRAL  VALVE:  There was mild stenosis.    TRICUSPID VALVE:  There was mild to moderate regurgitation.  Pulmonary artery systolic pressure was mildly increased.    HISTORY: PRIOR HISTORY: HLD, HTN, PAF, chronic CHF, thyroid nodule    PROCEDURE: The procedure was performed at the bedside. This was a routine study. The transthoracic approach was used. The study included complete 2D imaging, M-mode, complete spectral Doppler, and color Doppler. The heart rate was 101 bpm,  at the start of the study. Images were obtained from the parasternal, apical, subcostal, and suprasternal notch acoustic windows. Echocardiographic views were limited due to lung interference. This was a technically difficult study.    LEFT VENTRICLE: Size was normal. Systolic function was normal. Ejection fraction was estimated to be 55 %. There were no regional wall motion abnormalities. Wall thickness was at the upper limits of normal. DOPPLER: Transmitral flow  pattern: atrial fibrillation. Left ventricular diastolic function parameters were abnormal. Doppler parameters were consistent with elevated ventricular end-diastolic filling pressure.    RIGHT VENTRICLE: The size was normal. Systolic function was normal. Wall thickness was normal.    LEFT ATRIUM: The atrium was mildly to moderately dilated.    RIGHT ATRIUM: The atrium was mildly dilated.    MITRAL VALVE: Valve structure was normal. There was normal leaflet separation. DOPPLER: The transmitral velocity was within the normal range. There was mild stenosis. There was no significant regurgitation.    AORTIC VALVE: The valve was trileaflet. Leaflets exhibited normal thickness and normal cuspal separation. DOPPLER: Transaortic velocity was within the normal range. There was no evidence for stenosis. There was no significant  regurgitation.    TRICUSPID VALVE: The valve structure was normal. There was normal leaflet separation. DOPPLER: The transtricuspid velocity was within the normal range. There was  no evidence for stenosis. There was mild to moderate regurgitation. Pulmonary  artery systolic pressure was mildly increased.    PULMONIC VALVE: Leaflets exhibited normal thickness, no calcification, and normal cuspal separation. DOPPLER: The transpulmonic velocity was within the normal range. There was no significant regurgitation.    PERICARDIUM: There was no pericardial effusion. The pericardium was normal in appearance.    AORTA: The root exhibited normal size.    SYSTEMIC VEINS: IVC: The inferior vena cava was normal in size.    PULMONARY VEINS: DOPPLER: Doppler signals were not recordable in the pulmonary vein(s).    SYSTEM MEASUREMENT TABLES    2D  %FS: 37.15 %  Ao Diam: 2.74 cm  Ao asc: 2.72 cm  EDV(Teich): 103.17 ml  EF(Teich): 67.06 %  ESV(Teich): 33.98 ml  IVSd: 0.94 cm  LA Area: 13.2 cm2  LA Diam: 3.98 cm  LVEDV MOD A4C: 32.01 ml  LVEF MOD A4C: 63.74 %  LVESV MOD A4C: 11.61 ml  LVIDd: 4.72 cm  LVIDs: 2.96 cm  LVLd A4C: 5.9 cm  LVLs A4C: 4.79 cm  LVPWd: 0.93 cm  RA Area: 8.67 cm2  RVIDd: 3.35 cm  SV MOD A4C: 20.4 ml  SV(Teich): 69.19 ml    CW  TR MaxP.39 mmHg  TR Vmax: 2.89 m/s    MM  TAPSE: 1.51 cm    [facility]    Prepared and electronically signed by    Kwabena Seymour MD  Signed 2021 11:05:34    No results found for this or any previous visit.    No results found for this or any previous visit.    No valid procedures specified.  Results for orders placed during the hospital encounter of 10/05/22    NM myocardial perfusion spect (stress and/or rest)    Interpretation Summary    Resting ECG: ECG is abnormal. Resting ECG shows no ST-segment deviation. Patient a paced and V sensed.    Perfusion Defect Conclusion: The stress/rest perfusion ratio is 1.04 . There is no evidence of transient ischemic dilation (TID).    Perfusion: There is a left ventricular perfusion defect that is small in size with mild reduction in uptake present in the mid  to apical anterior and anteroseptal location(s) that is predominantly fixed. The defect appears to be an artifact caused by breast attenuation.    Stress Function: Left ventricular function post-stress is normal. Post-stress ejection fraction is 70 %.    Negative exercise pharmacological stress test      Meds/Allergies   all current active meds have been reviewed and current meds:   Current Facility-Administered Medications   Medication Dose Route Frequency    acetaminophen (TYLENOL) tablet 650 mg  650 mg Oral Q8H PRN    albuterol (PROVENTIL HFA,VENTOLIN HFA) inhaler 2 puff  2 puff Inhalation Q6H PRN    apixaban (ELIQUIS) tablet 5 mg  5 mg Oral BID    ascorbic acid (VITAMIN C) tablet 500 mg  500 mg Oral Daily    benzonatate (TESSALON PERLES) capsule 100 mg  100 mg Oral TID PRN    bumetanide (BUMEX) tablet 2 mg  2 mg Oral Daily    Cholecalciferol (VITAMIN D3) tablet 1,000 Units  1,000 Units Oral Daily    dextromethorphan-guaiFENesin (ROBITUSSIN DM) oral syrup 10 mL  10 mL Oral Q4H PRN    diltiazem (CARDIZEM CD) 24 hr capsule 120 mg  120 mg Oral Daily    ezetimibe (ZETIA) tablet 10 mg  10 mg Oral HS    famotidine (PEPCID) tablet 20 mg  20 mg Oral BID    flecainide (TAMBOCOR) tablet 150 mg  150 mg Oral BID    gabapentin (NEURONTIN) capsule 300 mg  300 mg Oral HS    insulin glargine (LANTUS) subcutaneous injection 10 Units 0.1 mL  10 Units Subcutaneous HS    insulin lispro (HumALOG/ADMELOG) 100 units/mL subcutaneous injection 1-5 Units  1-5 Units Subcutaneous TID AC    levalbuterol (XOPENEX) inhalation solution 1.25 mg  1.25 mg Nebulization TID    loratadine (CLARITIN) tablet 10 mg  10 mg Oral Daily    metoprolol succinate (TOPROL-XL) 24 hr tablet 75 mg  75 mg Oral Q12H    potassium chloride (Klor-Con M20) CR tablet 20 mEq  20 mEq Oral BID    rOPINIRole (REQUIP) tablet 2 mg  2 mg Oral HS    zolpidem (AMBIEN) tablet 10 mg  10 mg Oral HS          Counseling / Coordination of Care  Total floor / unit time spent today 20  minutes.  Greater than 50% of total time was spent with the patient and / or family counseling and / or coordination of care.      ** Please Note: Dragon 360 Dictation voice to text software may have been used in the creation of this document. **

## 2024-08-13 NOTE — PROGRESS NOTES
Progress Note - Pulmonary   Farnaz Martin 79 y.o. female MRN: [id number]  Unit/Bed#: S -01 Encounter: [id number]    Assessment/Plan:    Recurrent right pleural effusion: Appears stable on chest x-ray.  On a previous admission, patient had a thoracentesis of 700 mL of transudative fluid attributed to CHF exacerbation.  Currently she is declining Pleurx catheter.  She is agreeable to thoracentesis prior to discharge.  Currently on p.o. Bumex 2 mg daily.  Not requiring any oxygen and reports improvement of symptoms since she has been diuresed in the hospital on this admission.  Continue p.o. diuretic with Bumex 2 mg daily  IR thoracentesis today  Patient is agreeable to Pleurx catheter, IR physician contacted regarding this and they state that the patient is to be n.p.o. for this and they will pursue this procedure at a later time.     Acute on chronic respiratory failure: Patient presenting with worsening SOB requiring oxygen.  Currently she is off oxygen, saturating 95% on room air.  Has been diuresed in the hospital.  Recurrent right pleural effusion appears stable.  Recommend ambulatory pulse ox prior to discharge        Subjective:    Seen and examined at the bedside.  Patient was visibly distressed and having worsening conversational dyspnea and dyspnea at rest.  She is now requiring 3 L nasal cannula to maintain saturations at 90%.  She describes feeling worse than yesterday.  She denies any chest pain or pressure.  She states that she is willing to undergo thoracentesis today and is agreeable to getting a Pleurx catheter.    Discussed with the patient the risks and benefits of getting an indwelling pleural catheter.    Objective:    Vitals: Blood pressure 110/63, pulse 60, temperature 98 °F (36.7 °C), temperature source Oral, resp. rate 18, weight 74 kg (163 lb 2.3 oz), last menstrual period 02/01/1990, SpO2 98%, not currently breastfeeding.,Body mass index is 29.84 kg/m².      Intake/Output Summary  (Last 24 hours) at 8/13/2024 1312  Last data filed at 8/13/2024 1052  Gross per 24 hour   Intake 240 ml   Output 550 ml   Net -310 ml       Invasive Devices       Peripheral Intravenous Line  Duration             Peripheral IV 08/06/24 Left Antecubital 6 days                    Physical Exam  Constitutional:       General: She is not in acute distress.     Appearance: Normal appearance. She is not ill-appearing.   HENT:      Head: Normocephalic and atraumatic.      Mouth/Throat:      Mouth: Mucous membranes are moist.   Eyes:      General: No scleral icterus.     Extraocular Movements: Extraocular movements intact.      Pupils: Pupils are equal, round, and reactive to light.   Cardiovascular:      Rate and Rhythm: Normal rate and regular rhythm.      Pulses: Normal pulses.      Heart sounds: Normal heart sounds. No murmur heard.     No friction rub. No gallop.   Pulmonary:      Effort: Respiratory distress present.      Breath sounds: Rales (Right lower base) present. No wheezing.   Abdominal:      General: There is no distension.      Palpations: Abdomen is soft.      Tenderness: There is no abdominal tenderness. There is no guarding.   Musculoskeletal:      Right lower leg: No edema.      Left lower leg: No edema.   Skin:     General: Skin is warm.      Capillary Refill: Capillary refill takes less than 2 seconds.   Neurological:      Mental Status: She is alert and oriented to person, place, and time.   Psychiatric:         Mood and Affect: Mood normal.         Behavior: Behavior normal.          Labs: I have personally reviewed pertinent lab results.    Imaging and other studies: I have personally reviewed pertinent reports.

## 2024-08-13 NOTE — ASSESSMENT & PLAN NOTE
Goals:   Patient is still requiring oxygen and easily out of breath but mentation in at baseline.   Patient does confirm that Coral Garrison is medical and legal POA and would be his healthcare agent in the event he is unable to make his own decisions.  Patient and family a little frustrated that heart failure team did not see them as they were told on Monday [id number] and that the prognosis timeline has not been covered.   Goal remains focused on disease directed cares at this time.  Palliative will follow for ongoing goals of care discussions as situation evolves.  Serious illness conversation completed in the room with patient and daughter and son in law

## 2024-08-13 NOTE — ASSESSMENT & PLAN NOTE
ANTI TUSSIVE:   Benzonatate 100mg PO q 3hrs PRN  Dextromethorphan-Guaifenesin PO Syrup 10ml PRN

## 2024-08-13 NOTE — ASSESSMENT & PLAN NOTE
Lab Results   Component Value Date    LVEF 50 07/14/2024    BNP 1,091 (H) 08/06/2024    BNP 1,428 (H) 07/24/2024     Acute and chronic respiratory failure with hypoxia 2/2 CHF exacerbation a/e/b hypoxia, increased oxygen demands requiring CXR, Nebs, PO bumex and 4L NC oxygen.    Presents with increased shortness of breath, dyspnea on exertion, lower extremity edema, and cough with productive sputum  Patient is currently volume overloaded.  Initial hx: 3 to 4-day history of progressively worsening dyspnea, dry cough, worsening lower extremity edema.  She required increased oxygen when EMS arrived. difficulty talking normal sentences secondary to shortness of breath.  She is now unable to lie flat.  She was discharged on recent admission with 10 mg torsemide daily.  Clinical suspicion is under diuresis leading to volume overload.  Findings: ED provider: She is at baseline on 2 L O2 supplemental O2 and, because of hypoxia to the 80s, was bumped up to 4.  On arrival 97% on 4L (baseline chronic 2L NC)  Currently pt is on room air saturating appopriately for her, diuresed approximately 3.9L of fluid since admission, improving SOB   Was seen by heart failure team 8/12, no recommendations were given    Serial cxr shows stable pleural effusion   Most recent Echo 50% LVEF     Weight (last 2 days)       Date/Time Weight    08/12/24 0600 77.8 (171.52)    08/11/24 0600 73 (160.94)          I/O: - 3.9 L since admission    Plan:  Per cardiology: PO Bumex to 2 mg BID.   kdur 20 meq BID. , B-Blocker: Toprol   P.O. Bumex 2mg BID  Cardiac diet, sodium restriction  CMP, magnesium a.m; Goal Mg > 2 and K > 4; Replete prn  Daily standing weights, Measure I/O  Thoracocentesis performed, 1300 mL clear yellow fluid drained from right pleural space.

## 2024-08-13 NOTE — ASSESSMENT & PLAN NOTE
Wt Readings from Last 3 Encounters:   24 74 kg (163 lb 2.3 oz)   24 72 kg (158 lb 12.8 oz)   24 71.9 kg (158 lb 8.2 oz)     Lab Results   Component Value Date    BNP 1,091 (H) 2024    BNP 1,428 (H) 2024    LVEF 50 2024           Baseline O2 2L  On admission [id number] pt presented w/ acute on chronic respiratory failure w/ hypoxia,  requireing nebs 4L NC oxygen Acute and chronic respiratory failure with hypoxia 2/2 CHF exacerbation a/e/b hypoxia, increased oxygen demands requiring CXR, Nebs, PO bumex and 4L NC oxygen.   SXS: 4 day hx of SOB, dyspnea on exertion, productive cough, weight gain, lower extremity edeam. Could not complete sentences at rest, orthopnea  Recent admission started on 10mg Torsemide PO Daily     [id number] XR chest portable  Airspace disease and pleural effusion on the right.   [id number] XR chest portable  Persistent large right pleural effusion with right base atelectasis. Pneumonia not excluded in the appropriate clinical setting.   [id number] XR chest portable  No significant change of pleural effusions, pulmonary opacities, or cardiomediastinal silhouette   [id number] XR chest 2 views  Mild pulmonary edema, improved. Grossly stable right greater than left pleural effusions.  [id number] ECHO  •  Left Ventricle: Left ventricular cavity size is normal. Wall thickness is normal. The left ventricular ejection fraction is 55%. Systolic function is normal. Wall motion is normal.  •  Right Ventricle: Right ventricular cavity size is normal. Systolic function is normal. A pacer wire is present.  •  Aortic Valve: There is trace regurgitation.  •  Mitral Valve: There is moderate to severe regurgitation.  •  Tricuspid Valve: There is mild to moderate regurgitation. The right ventricular systolic pressure is severely elevated. The estimated right ventricular systolic pressure is 66.00 mmHg.  •  Pericardium: There is a left pleural effusion.  •  Prior TTE study available  for comparison. Prior study date: 12/5/2023. Changes noted when compared to prior study. Changes include: Mitral regurgitation has worsened.      Plan:  Cardiology on board  Bumex 2mg PO BID  Toprolo 75mg PO BID  Cardiac diet  Symptomatic thoracentesis w/ possible plurex prior to discharge   Currently patient is volume overloaded with intravascular depletion which has been difficult to balance

## 2024-08-13 NOTE — ASSESSMENT & PLAN NOTE
SP Albation 2021  PLAN:  Apixaban 5mg PO BID  Flecainede 150mg PO BID  Metoprolol 75mg PO BID  Diltiazem 120mg PO TID  Continue diltiazem 120 mg daily3.

## 2024-08-14 ENCOUNTER — APPOINTMENT (INPATIENT)
Dept: RADIOLOGY | Facility: HOSPITAL | Age: 79
DRG: 291 | End: 2024-08-14
Payer: MEDICARE

## 2024-08-14 ENCOUNTER — PATIENT OUTREACH (OUTPATIENT)
Dept: CASE MANAGEMENT | Facility: OTHER | Age: 79
End: 2024-08-14

## 2024-08-14 LAB
ALBUMIN SERPL BCG-MCNC: 3.8 G/DL (ref 3.5–5)
ALP SERPL-CCNC: 70 U/L (ref 34–104)
ALT SERPL W P-5'-P-CCNC: 24 U/L (ref 7–52)
ANION GAP SERPL CALCULATED.3IONS-SCNC: 7 MMOL/L (ref 4–13)
AST SERPL W P-5'-P-CCNC: 21 U/L (ref 13–39)
B PARAP IS1001 DNA NPH QL NAA+NON-PROBE: NOT DETECTED
B PERT.PT PRMT NPH QL NAA+NON-PROBE: NOT DETECTED
BASOPHILS # BLD AUTO: 0.05 THOUSANDS/ÂΜL (ref 0–0.1)
BASOPHILS NFR BLD AUTO: 1 % (ref 0–1)
BILIRUB DIRECT SERPL-MCNC: 0.3 MG/DL (ref 0–0.2)
BILIRUB SERPL-MCNC: 0.95 MG/DL (ref 0.2–1)
BUN SERPL-MCNC: 47 MG/DL (ref 5–25)
C PNEUM DNA NPH QL NAA+NON-PROBE: NOT DETECTED
CALCIUM SERPL-MCNC: 9.3 MG/DL (ref 8.4–10.2)
CHLORIDE SERPL-SCNC: 98 MMOL/L (ref 96–108)
CO2 SERPL-SCNC: 29 MMOL/L (ref 21–32)
CREAT SERPL-MCNC: 1.61 MG/DL (ref 0.6–1.3)
EOSINOPHIL # BLD AUTO: 0.11 THOUSAND/ÂΜL (ref 0–0.61)
EOSINOPHIL NFR BLD AUTO: 2 % (ref 0–6)
ERYTHROCYTE [DISTWIDTH] IN BLOOD BY AUTOMATED COUNT: 14.6 % (ref 11.6–15.1)
FLUAV RNA NPH QL NAA+NON-PROBE: NOT DETECTED
FLUBV RNA NPH QL NAA+NON-PROBE: NOT DETECTED
GFR SERPL CREATININE-BSD FRML MDRD: 29 ML/MIN/1.73SQ M
GLUCOSE SERPL-MCNC: 104 MG/DL (ref 65–140)
GLUCOSE SERPL-MCNC: 111 MG/DL (ref 65–140)
GLUCOSE SERPL-MCNC: 170 MG/DL (ref 65–140)
GLUCOSE SERPL-MCNC: 83 MG/DL (ref 65–140)
GLUCOSE SERPL-MCNC: 88 MG/DL (ref 65–140)
HADV DNA NPH QL NAA+NON-PROBE: NOT DETECTED
HCOV 229E RNA NPH QL NAA+NON-PROBE: NOT DETECTED
HCOV HKU1 RNA NPH QL NAA+NON-PROBE: NOT DETECTED
HCOV NL63 RNA NPH QL NAA+NON-PROBE: NOT DETECTED
HCOV OC43 RNA NPH QL NAA+NON-PROBE: NOT DETECTED
HCT VFR BLD AUTO: 37.4 % (ref 34.8–46.1)
HGB BLD-MCNC: 11.6 G/DL (ref 11.5–15.4)
HMPV RNA NPH QL NAA+NON-PROBE: NOT DETECTED
HPIV1 RNA NPH QL NAA+NON-PROBE: NOT DETECTED
HPIV2 RNA NPH QL NAA+NON-PROBE: NOT DETECTED
HPIV3 RNA NPH QL NAA+NON-PROBE: NOT DETECTED
HPIV4 RNA NPH QL NAA+NON-PROBE: NOT DETECTED
IMM GRANULOCYTES # BLD AUTO: 0.03 THOUSAND/UL (ref 0–0.2)
IMM GRANULOCYTES NFR BLD AUTO: 1 % (ref 0–2)
LDH SERPL-CCNC: 199 U/L (ref 140–271)
LYMPHOCYTES # BLD AUTO: 1.43 THOUSANDS/ÂΜL (ref 0.6–4.47)
LYMPHOCYTES NFR BLD AUTO: 22 % (ref 14–44)
M PNEUMO DNA NPH QL NAA+NON-PROBE: NOT DETECTED
MCH RBC QN AUTO: 28.4 PG (ref 26.8–34.3)
MCHC RBC AUTO-ENTMCNC: 31 G/DL (ref 31.4–37.4)
MCV RBC AUTO: 91 FL (ref 82–98)
MONOCYTES # BLD AUTO: 0.7 THOUSAND/ÂΜL (ref 0.17–1.22)
MONOCYTES NFR BLD AUTO: 11 % (ref 4–12)
NEUTROPHILS # BLD AUTO: 4.27 THOUSANDS/ÂΜL (ref 1.85–7.62)
NEUTS SEG NFR BLD AUTO: 63 % (ref 43–75)
NRBC BLD AUTO-RTO: 0 /100 WBCS
PLATELET # BLD AUTO: 220 THOUSANDS/UL (ref 149–390)
PMV BLD AUTO: 10.8 FL (ref 8.9–12.7)
POTASSIUM SERPL-SCNC: 4.6 MMOL/L (ref 3.5–5.3)
PROT SERPL-MCNC: 6.6 G/DL (ref 6.4–8.4)
RBC # BLD AUTO: 4.09 MILLION/UL (ref 3.81–5.12)
RSV RNA NPH QL NAA+NON-PROBE: NOT DETECTED
RV+EV RNA NPH QL NAA+NON-PROBE: NOT DETECTED
SARS-COV-2 RNA NPH QL NAA+NON-PROBE: NOT DETECTED
SODIUM SERPL-SCNC: 134 MMOL/L (ref 135–147)
WBC # BLD AUTO: 6.59 THOUSAND/UL (ref 4.31–10.16)

## 2024-08-14 PROCEDURE — 80048 BASIC METABOLIC PNL TOTAL CA: CPT

## 2024-08-14 PROCEDURE — 99232 SBSQ HOSP IP/OBS MODERATE 35: CPT | Performed by: INTERNAL MEDICINE

## 2024-08-14 PROCEDURE — 82948 REAGENT STRIP/BLOOD GLUCOSE: CPT

## 2024-08-14 PROCEDURE — 0202U NFCT DS 22 TRGT SARS-COV-2: CPT

## 2024-08-14 PROCEDURE — 83615 LACTATE (LD) (LDH) ENZYME: CPT

## 2024-08-14 PROCEDURE — 71045 X-RAY EXAM CHEST 1 VIEW: CPT

## 2024-08-14 PROCEDURE — 94760 N-INVAS EAR/PLS OXIMETRY 1: CPT

## 2024-08-14 PROCEDURE — 94640 AIRWAY INHALATION TREATMENT: CPT

## 2024-08-14 PROCEDURE — 99232 SBSQ HOSP IP/OBS MODERATE 35: CPT | Performed by: NURSE PRACTITIONER

## 2024-08-14 PROCEDURE — 85025 COMPLETE CBC W/AUTO DIFF WBC: CPT

## 2024-08-14 PROCEDURE — 80076 HEPATIC FUNCTION PANEL: CPT | Performed by: FAMILY MEDICINE

## 2024-08-14 PROCEDURE — 99232 SBSQ HOSP IP/OBS MODERATE 35: CPT | Performed by: FAMILY MEDICINE

## 2024-08-14 RX ORDER — SODIUM CHLORIDE 9 MG/ML
50 INJECTION, SOLUTION INTRAVENOUS CONTINUOUS
Status: DISCONTINUED | OUTPATIENT
Start: 2024-08-14 | End: 2024-08-15

## 2024-08-14 RX ADMIN — EZETIMIBE 10 MG: 10 TABLET ORAL at 21:03

## 2024-08-14 RX ADMIN — OXYCODONE HYDROCHLORIDE AND ACETAMINOPHEN 500 MG: 500 TABLET ORAL at 09:26

## 2024-08-14 RX ADMIN — LEVALBUTEROL HYDROCHLORIDE 1.25 MG: 1.25 SOLUTION RESPIRATORY (INHALATION) at 20:43

## 2024-08-14 RX ADMIN — ZOLPIDEM TARTRATE 10 MG: 5 TABLET, COATED ORAL at 21:03

## 2024-08-14 RX ADMIN — FAMOTIDINE 20 MG: 20 TABLET, FILM COATED ORAL at 16:43

## 2024-08-14 RX ADMIN — ROPINIROLE HYDROCHLORIDE 2 MG: 1 TABLET, FILM COATED ORAL at 21:03

## 2024-08-14 RX ADMIN — METOPROLOL SUCCINATE 75 MG: 50 TABLET, EXTENDED RELEASE ORAL at 16:43

## 2024-08-14 RX ADMIN — GABAPENTIN 300 MG: 300 CAPSULE ORAL at 21:03

## 2024-08-14 RX ADMIN — SODIUM CHLORIDE 50 ML/HR: 0.9 INJECTION, SOLUTION INTRAVENOUS at 16:44

## 2024-08-14 RX ADMIN — FLECAINIDE ACETATE 150 MG: 50 TABLET ORAL at 09:26

## 2024-08-14 RX ADMIN — Medication 1000 UNITS: at 09:26

## 2024-08-14 RX ADMIN — LEVALBUTEROL HYDROCHLORIDE 1.25 MG: 1.25 SOLUTION RESPIRATORY (INHALATION) at 13:26

## 2024-08-14 RX ADMIN — FAMOTIDINE 20 MG: 20 TABLET, FILM COATED ORAL at 09:26

## 2024-08-14 RX ADMIN — DILTIAZEM HYDROCHLORIDE 120 MG: 120 CAPSULE, COATED, EXTENDED RELEASE ORAL at 09:26

## 2024-08-14 RX ADMIN — INSULIN GLARGINE 10 UNITS: 100 INJECTION, SOLUTION SUBCUTANEOUS at 21:06

## 2024-08-14 RX ADMIN — APIXABAN 5 MG: 5 TABLET, FILM COATED ORAL at 09:26

## 2024-08-14 RX ADMIN — METOPROLOL SUCCINATE 75 MG: 50 TABLET, EXTENDED RELEASE ORAL at 05:46

## 2024-08-14 RX ADMIN — APIXABAN 5 MG: 5 TABLET, FILM COATED ORAL at 16:42

## 2024-08-14 RX ADMIN — FLECAINIDE ACETATE 150 MG: 50 TABLET ORAL at 16:45

## 2024-08-14 RX ADMIN — LEVALBUTEROL HYDROCHLORIDE 1.25 MG: 1.25 SOLUTION RESPIRATORY (INHALATION) at 07:38

## 2024-08-14 NOTE — ASSESSMENT & PLAN NOTE
Thoracocentesis performed, 1300 mL clear yellow fluid drained from right pleural space.   Pulm does not recommend pleural catheter at this time.

## 2024-08-14 NOTE — ASSESSMENT & PLAN NOTE
Wt Readings from Last 3 Encounters:   24 72.6 kg (160 lb 0.9 oz)   24 72 kg (158 lb 12.8 oz)   24 71.9 kg (158 lb 8.2 oz)     Lab Results   Component Value Date    BNP 1,091 (H) 2024    BNP 1,428 (H) 2024    LVEF 50 2024           Baseline O2 2L  On admission [id number] pt presented w/ acute on chronic respiratory failure w/ hypoxia,  requireing nebs 4L NC oxygen Acute and chronic respiratory failure with hypoxia 2/2 CHF exacerbation a/e/b hypoxia, increased oxygen demands requiring CXR, Nebs, PO bumex and 4L NC oxygen.   SXS: 4 day hx of SOB, dyspnea on exertion, productive cough, weight gain, lower extremity edeam. Could not complete sentences at rest, orthopnea  Recent admission started on 10mg Torsemide PO Daily     [id number] XR chest portable  Airspace disease and pleural effusion on the right.   [id number] XR chest portable  Persistent large right pleural effusion with right base atelectasis. Pneumonia not excluded in the appropriate clinical setting.   [id number] XR chest portable  No significant change of pleural effusions, pulmonary opacities, or cardiomediastinal silhouette   [id number] XR chest 2 views  Mild pulmonary edema, improved. Grossly stable right greater than left pleural effusions.  [id number] ECHO    Left Ventricle: Left ventricular cavity size is normal. Wall thickness is normal. The left ventricular ejection fraction is 55%. Systolic function is normal. Wall motion is normal.    Right Ventricle: Right ventricular cavity size is normal. Systolic function is normal. A pacer wire is present.    Aortic Valve: There is trace regurgitation.    Mitral Valve: There is moderate to severe regurgitation.    Tricuspid Valve: There is mild to moderate regurgitation. The right ventricular systolic pressure is severely elevated. The estimated right ventricular systolic pressure is 66.00 mmHg.    Pericardium: There is a left pleural effusion.    Prior TTE study available for  comparison. Prior study date: 12/5/2023. Changes noted when compared to prior study. Changes include: Mitral regurgitation has worsened.      Plan:  Cardiology on board  Bumex 2mg PO BID on hold d/t renal function  Toprolo 75mg PO BID  Cardiac diet  Symptomatic thoracentesis [id number] with 1300ml drained.  No plurex cath placement pre pulm can be done outpatient.   Currently patient is volume overloaded with intravascular depletion which has been difficult to balance

## 2024-08-14 NOTE — PROGRESS NOTES
Chart review complete the patient admitted 8/6/24 to [facility]. This Admin Coordinator will continue to monitor via chart review.

## 2024-08-14 NOTE — ASSESSMENT & PLAN NOTE
Pt endorsing orthopnea, SOB  Productive in nature without production of sputum  Uses Spiriva inhaler and Robitussin at home for sx's  R/p chest x-ray shows increased congestion, persistent pleural effusion that is worsening on chest x-rays  Cough component likely CHF exacerbation  Pulmonology was consulted, recommended no procedural interventions at this time, but recommended   -Continue XOPENEX inhalation solution 1.25 mg   -Continue home inhalers and nebulizer treatment on discharge.   -Possible home oxygen evaluation   Following thoracocentesis 8/13, pt became sob with conversation, was put on 3L O2 NC and was lethargic, pulmonology recommended against pleural catheter    Plan:  Atrovent neb four times daily  XOPENEX inhalation solution 1.25 mg   Continue home inhalers and nebulizer treatment on discharge.   Possible home oxygen evaluation   Thoracocentesis performed, 1300 mL clear yellow fluid drained from right pleural space.   Monitor oxygen status today, wean as tolerated

## 2024-08-14 NOTE — PLAN OF CARE

## 2024-08-14 NOTE — HOSPICE NOTE
This pt had been approved for [facility] hospice at the end of June. I did reach out to daughter Coral and spoke with her about hospice care and services. She is planning on coming in this after noon and talking with her mother and will get back to me.

## 2024-08-14 NOTE — ASSESSMENT & PLAN NOTE
Discussion with home hospice, daughter will discuss with patient.  They are leaning toward home hospice at some point, but this admission is not likely

## 2024-08-14 NOTE — ASSESSMENT & PLAN NOTE
Social Support:  Supportive listening provided  Normalized experience of patient/family  Provided anxiety containment     Follow-up  We appreciate the opportunity to participate in this patient's care.   We will continue to follow. PSC to follow up.  Please do not hesitate to contact our on-call provider through our clinic answering service at [phone] should there be an acute change or other symptom control concerns.             Decisional apparatus:  Patient with capacity to make medical decisions on my exam today. If such capacity is lost, patient's substitute decision maker would default Coral Garrison patients daughter.           Advance Directive / Living Will / POLST:  None on file - Coral Garrison is legal and medical POA and this was reiterated by patient bedside.      I have reviewed the patient's controlled substance dispensing history in the Prescription Drug Monitoring Program in compliance with the [place] regulations before prescribing any controlled substances.

## 2024-08-14 NOTE — ASSESSMENT & PLAN NOTE
SP Albation 2021  PLAN:  Apixaban 5mg PO BID  Flecainede 150mg PO BID  Metoprolol 75mg PO BID  Diltiazem 120mg PO Daily

## 2024-08-14 NOTE — ASSESSMENT & PLAN NOTE
Recent Labs     08/12/24  0620 08/13/24  0449 08/14/24  0628   CREATININE 1.44* 1.68* 1.61*   EGFR 34 28 29     Estimated Creatinine Clearance: 26 mL/min (A) (by C-G formula based on SCr of 1.61 mg/dL (H)).  POA 1.38; (baseline 1.1-1.28)     Plan:  PO Bumex 2mg twice daily currently on hold  Monitor BMP, IV fluids held  Avoid hypoperfusion of the kidneys, minimize nephrotoxins  Currently at baseline, continue to monitor with daily labs

## 2024-08-14 NOTE — CASE MANAGEMENT
Case Management Progress Note    Patient name Farnaz Martin  Location S /S -01 MRN [id number]  : 1945 Date 2024       LOS (days): 8  Geometric Mean LOS (GMLOS) (days): 3.9  Days to GMLOS:-3.9        OBJECTIVE:        Current admission status: Inpatient  Preferred Pharmacy:   [id number] - [place] - [address]  [address]  [place] [address]  Phone: [phone] Fax: [phone]    [facility] - [place], [place] - [address]  [address]  [address]  [place] [address]  Phone: [phone] Fax: [phone]    Primary Care Provider: Naveen Monsivais MD    Primary Insurance: MEDICARE  Secondary Insurance: AARP    PROGRESS NOTE:    Palliative resident notified this CM of pt and family looking into hospice care. CM f/u with patient at bedside and pt's dtr Ynes via speaker phone re: same. Dtr relayed they have been in contact with [facility] but would like  Hospice to eval as would like further information re: the different levels of Hospice care. Dtr relayed would likely be to hospital around 2pm. CM sent referral to  Hospice via [facility]osp[facility] liaison to f/u with pt and family directly.

## 2024-08-14 NOTE — ASSESSMENT & PLAN NOTE
Pt was consulted by palliative care and daughter was present in room as POA, discussed hospice topic as her condition is a stepwise progression.  Continued discussion with goals of care going forward as pt's prognosis progresses

## 2024-08-14 NOTE — ASSESSMENT & PLAN NOTE
Recent Labs     08/12/24  0620 08/13/24  0449 08/14/24  0628   CREATININE 1.44* 1.68* 1.61*   EGFR 34 28 29     Estimated Creatinine Clearance: 26 mL/min (A) (by C-G formula based on SCr of 1.61 mg/dL (H)).    Elevated creatinine, continue to monitor    Plan:  PO Bumex 2mg daily  Monitor BMP, gentle NS at 50cc/hr for 10 hours per cardiology  Avoid hypoperfusion of the kidneys, minimize nephrotoxins  Currently at baseline, continue to monitor with daily labs

## 2024-08-14 NOTE — PROGRESS NOTES
"Progress Note - Pulmonary   Farnaz Martin 79 y.o. female MRN: [id number]  Unit/Bed#: S -01 Encounter: [id number]    Assessment/Plan:    Recurrent right pleural effusion: Appears stable on chest x-ray.  On a previous admission, patient had a thoracentesis of 700 mL of transudative fluid attributed to CHF exacerbation.  She is now status post IR thoracentesis on 8/13, drained 1300 mL of clear yellow fluid, positive Light's criteria (although on Bumex). Her Bumex was held due to increasing creatinine.  She is still on 3 L nasal cannula to maintain saturations in the percent or higher.  She states that she feels about the same after the thoracentesis.  Diuretic regimen per primary team  Patient is agreeable to Pleurx catheter but we will defer at this time.  Patient can follow-up with pulmonary in the office to further discuss benefits versus risks.     Acute on chronic respiratory failure: Patient presenting with worsening SOB requiring oxygen.  Currently she is on 3 L nasal cannula oxygen, saturating 90% or higher.  Has been diuresed in the hospital.  Recommend ambulatory pulse ox prior to discharge    Subjective:    Patient seen and evaluated at the bedside.  She is found on 3 L nasal cannula, saturating between 85 to 95%.  The pulse ox is found on her left toe.  She looks comfortable, although she had is having several coughing fits.  She states that the cough has been the same as yesterday.  She denies bringing up any phlegm.  No fevers or chills.  She states that she feels about the same as yesterday and that the thoracentesis did not change her symptoms.    Spoke to the patient's daughter over the phone at the bedside and she states that the patient is definitely coughing more today than yesterday.  And that she had an episode of confusion overnight where she thought she was some rales, and was saying some nonsensical things.  Patient does not recall this and states that she was \"trying to call her " "daughter \".  Patient is alert and oriented x 3 at this time, eating her food, resting comfortably, although coughing.  She states that she has been getting the as needed Robitussin and Tessalon Perles and they provide some benefit.    Objective:    Vitals: Blood pressure 140/78, pulse 61, temperature 98.4 °F (36.9 °C), temperature source Oral, resp. rate 18, weight 72.6 kg (160 lb 0.9 oz), last menstrual period 02/01/1990, SpO2 95%, not currently breastfeeding.,Body mass index is 29.27 kg/m².      Intake/Output Summary (Last 24 hours) at 8/14/2024 1043  Last data filed at 8/14/2024 0600  Gross per 24 hour   Intake 400 ml   Output 750 ml   Net -350 ml       Invasive Devices       Peripheral Intravenous Line  Duration             Peripheral IV 08/13/24 Dorsal (posterior);Left Forearm <1 day                    Physical Exam  Constitutional:       General: She is not in acute distress.     Appearance: Normal appearance. She is not ill-appearing.   HENT:      Head: Normocephalic and atraumatic.      Mouth/Throat:      Mouth: Mucous membranes are moist.   Eyes:      General: No scleral icterus.     Extraocular Movements: Extraocular movements intact.      Pupils: Pupils are equal, round, and reactive to light.   Cardiovascular:      Rate and Rhythm: Normal rate and regular rhythm.      Pulses: Normal pulses.      Heart sounds: Normal heart sounds. No murmur heard.     No friction rub. No gallop.   Pulmonary:      Effort: Respiratory distress present.      Breath sounds: Rales (Right lower base) present. No wheezing.   Abdominal:      General: There is no distension.      Palpations: Abdomen is soft.      Tenderness: There is no abdominal tenderness. There is no guarding.   Musculoskeletal:      Right lower leg: No edema.      Left lower leg: No edema.   Skin:     General: Skin is warm.      Capillary Refill: Capillary refill takes less than 2 seconds.   Neurological:      Mental Status: She is alert and oriented to " person, place, and time.   Psychiatric:         Mood and Affect: Mood normal.         Behavior: Behavior normal.          Labs: I have personally reviewed pertinent lab results.    Imaging and other studies: I have personally reviewed pertinent reports.

## 2024-08-14 NOTE — PROGRESS NOTES
[facility]  Progress Note  Name: Farnaz Martin I  MRN: [id number]  Unit/Bed#: S -01 I Date of Admission: 8/6/2024   Date of Service: 8/14/2024 I Hospital Day: 8    Assessment & Plan   * Acute on chronic heart failure (HCC)  Assessment & Plan    Lab Results   Component Value Date    LVEF 50 07/14/2024    BNP 1,091 (H) 08/06/2024    BNP 1,428 (H) 07/24/2024     Acute and chronic respiratory failure with hypoxia 2/2 CHF exacerbation a/e/b hypoxia, increased oxygen demands requiring CXR, Nebs, PO bumex and 4L NC oxygen.    Presents with increased shortness of breath, dyspnea on exertion, lower extremity edema, and cough with productive sputum  Patient is currently volume overloaded.  Initial hx: 3 to 4-day history of progressively worsening dyspnea, dry cough, worsening lower extremity edema.  She required increased oxygen when EMS arrived. difficulty talking normal sentences secondary to shortness of breath.  She is now unable to lie flat.  She was discharged on recent admission with 10 mg torsemide daily.  Clinical suspicion is under diuresis leading to volume overload.  Findings: ED provider: She is at baseline on 2 L O2 supplemental O2 and, because of hypoxia to the 80s, was bumped up to 4.  On arrival 97% on 4L (baseline chronic 2L NC)  Currently pt is on room air saturating appopriately for her, diuresed approximately 3.9L of fluid since admission, improving SOB   Was seen by heart failure team 8/12, no recommendations were given    Serial cxr shows stable pleural effusion   Most recent Echo 50% LVEF     Weight (last 2 days)       Date/Time Weight    08/13/24 1015 74 (163.14)    08/13/24 0600 78.6 (173.28)    08/12/24 0600 77.8 (171.52)          I/O: - 3.9 L since admission    Plan:  Per cardiology: PO Bumex 2 mg daily   kdur 20 meq BID. , B-Blocker: Toprol  Cardiac diet, sodium restriction  CMP, magnesium a.m; Goal Mg > 2 and K > 4; Replete prn  Daily standing weights, Measure  I/O  Thoracocentesis performed, 1300 mL clear yellow fluid drained from right pleural space.           Elevated serum creatinine  Assessment & Plan    Recent Labs     08/12/24  0620 08/13/24  0449 08/14/24  0628   CREATININE 1.44* 1.68* 1.61*   EGFR 34 28 29     Estimated Creatinine Clearance: 26 mL/min (A) (by C-G formula based on SCr of 1.61 mg/dL (H)).    Elevated creatinine, continue to monitor    Plan:  PO Bumex 2mg daily  Monitor BMP, gentle NS at 50cc/hr for 10 hours per cardiology  Avoid hypoperfusion of the kidneys, minimize nephrotoxins  Currently at baseline, continue to monitor with daily labs    Cough  Assessment & Plan  Pt endorsing orthopnea, SOB  Productive in nature without production of sputum  Uses Spiriva inhaler and Robitussin at home for sx's  R/p chest x-ray shows increased congestion, persistent pleural effusion that is worsening on chest x-rays  Cough component likely CHF exacerbation  Pulmonology was consulted, recommended no procedural interventions at this time, but recommended   -Continue XOPENEX inhalation solution 1.25 mg   -Continue home inhalers and nebulizer treatment on discharge.   -Possible home oxygen evaluation   Following thoracocentesis 8/13, pt became sob with conversation, was put on 3L O2 NC and was lethargic, pulmonology recommended against pleural catheter    Plan:  Atrovent neb four times daily  XOPENEX inhalation solution 1.25 mg   Continue home inhalers and nebulizer treatment on discharge.   Possible home oxygen evaluation   Thoracocentesis performed, 1300 mL clear yellow fluid drained from right pleural space.   Monitor oxygen status today, wean as tolerated      Paroxysmal A-fib (HCC)  Assessment & Plan  Afib-  S/p ablation in 03/2021.   EKG- AV dual-paced rhythm, Vent. rate has decreased by 14 BPM since prior EKG  Low voltage QRS    Plan:  Eliquis 5 mg twice daily  Continue flecainide 150 mg twice daily and metoprolol succinate 75 mg twice daily  Continue  diltiazem 120 mg daily    Palliative care patient  Assessment & Plan  Pt was consulted by palliative care and daughter was present in room as POA, discussed hospice topic as her condition is a stepwise progression.  Continued discussion with goals of care going forward as pt's prognosis progresses    Recurrent pleural effusion on right  Assessment & Plan  Thoracocentesis performed, 1300 mL clear yellow fluid drained from right pleural space.   Pulm does not recommend pleural catheter at this time.     Goals of care, counseling/discussion  Assessment & Plan  Discussion with home hospice, daughter will discuss with patient.  They are leaning toward home hospice at some point, but this admission is not likely                VTE Pharmacologic Prophylaxis: VTE Score: 5 High Risk (Score >/= 5) - Pharmacological DVT Prophylaxis Ordered: apixaban (Eliquis). Sequential Compression Devices Ordered.    Mobility:   Basic Mobility Inpatient Raw Score: 23  JH-HLM Goal: 7: Walk 25 feet or more  JH-HLM Achieved: 7: Walk 25 feet or more  JH-HLM Goal achieved. Continue to encourage appropriate mobility.    Patient Centered Rounds: I performed bedside rounds with nursing staff today.   Discussions with Specialists or Other Care Team Provider: Cardiology, pulmonology    Education and Discussions with Family / Patient: Updated  (daughter) via phone.    Total Time Spent on Date of Encounter in care of patient: 60 mins. This time was spent on one or more of the following: performing physical exam; counseling and coordination of care; obtaining or reviewing history; documenting in the medical record; reviewing/ordering tests, medications or procedures; communicating with other healthcare professionals and discussing with patient's family/caregivers.    Current Length of Stay: 8 day(s)  Current Patient Status: Inpatient   Certification Statement: The patient will continue to require additional inpatient hospital stay due to  shortness of breath, CHF exacerbation  Discharge Plan: Anticipate discharge in 24-48 hrs to rehab facility.    Code Status: Level 3 - DNAR and DNI    Subjective:   Evaluated patient at bedside, no acute events occurred overnight.  Patient is resting comfortably in bed, sleeping without the use of supplemental oxygen.  She reports feeling tired and fatigued, but denies any worsening shortness of breath or cough.  Upon examination, patient was saturating between 90 to 94% on room air.  All questions and concerns were answered at bedside.    Objective:     Vitals:   Temp (24hrs), Av.4 °F (36.9 °C), Min:98.1 °F (36.7 °C), Max:98.7 °F (37.1 °C)    Temp:  [98.1 °F (36.7 °C)-98.7 °F (37.1 °C)] 98.4 °F (36.9 °C)  HR:  [60-65] 61  Resp:  [18-19] 18  BP: (105-140)/(37-78) 140/78  SpO2:  [95 %-100 %] 95 %  Body mass index is 29.27 kg/m².     Input and Output Summary (last 24 hours):     Intake/Output Summary (Last 24 hours) at 2024 1316  Last data filed at 2024 0600  Gross per 24 hour   Intake 220 ml   Output 550 ml   Net -330 ml       Physical Exam:   Physical Exam  Vitals and nursing note reviewed.   Constitutional:       General: She is not in acute distress.     Appearance: She is well-developed.      Comments: Pt appeared fatigued   HENT:      Head: Normocephalic and atraumatic.   Eyes:      Conjunctiva/sclera: Conjunctivae normal.   Cardiovascular:      Rate and Rhythm: Normal rate and regular rhythm.      Pulses: Normal pulses.      Heart sounds: Murmur heard.   Pulmonary:      Effort: Pulmonary effort is normal. No respiratory distress.      Breath sounds: Normal breath sounds.   Abdominal:      Palpations: Abdomen is soft.      Tenderness: There is no abdominal tenderness.   Musculoskeletal:         General: No swelling.      Cervical back: Neck supple.   Skin:     General: Skin is warm and dry.      Capillary Refill: Capillary refill takes less than 2 seconds.   Neurological:      General: No focal  deficit present.      Mental Status: She is oriented to person, place, and time. Mental status is at baseline.   Psychiatric:         Mood and Affect: Mood normal.         Behavior: Behavior normal.          Additional Data:     Labs:  Results from last 7 days   Lab Units 08/14/24  0628   WBC Thousand/uL 6.59   HEMOGLOBIN g/dL 11.6   HEMATOCRIT % 37.4   PLATELETS Thousands/uL 220   SEGS PCT % 63   LYMPHO PCT % 22   MONO PCT % 11   EOS PCT % 2     Results from last 7 days   Lab Units 08/14/24  0628   SODIUM mmol/L 134*   POTASSIUM mmol/L 4.6   CHLORIDE mmol/L 98   CO2 mmol/L 29   BUN mg/dL 47*   CREATININE mg/dL 1.61*   ANION GAP mmol/L 7   CALCIUM mg/dL 9.3   ALBUMIN g/dL 3.8   TOTAL BILIRUBIN mg/dL 0.95   ALK PHOS U/L 70   ALT U/L 24   AST U/L 21   GLUCOSE RANDOM mg/dL 104         Results from last 7 days   Lab Units 08/14/24  1139 08/14/24  0720 08/13/24  2118 08/13/24  1517 08/13/24  1114 08/13/24  0737 08/12/24  2059 08/12/24  1936 08/12/24  1552 08/12/24  1110 08/12/24  0751 08/11/24  2220   POC GLUCOSE mg/dl 111 88 169* 130 210* 115 251* 219* 111 189* 133 251*               Lines/Drains:  Invasive Devices       Peripheral Intravenous Line  Duration             Peripheral IV 08/13/24 Dorsal (posterior);Left Forearm <1 day                          Imaging: Reviewed radiology reports from this admission including: chest xray and chest CT scan    Recent Cultures (last 7 days):   Results from last 7 days   Lab Units 08/13/24  1259   GRAM STAIN RESULT  1+ Polys  No bacteria seen   BODY FLUID CULTURE, STERILE  No growth       Last 24 Hours Medication List:   Current Facility-Administered Medications   Medication Dose Route Frequency Provider Last Rate    acetaminophen  650 mg Oral Q8H PRN Steve Tripathi MD      albuterol  2 puff Inhalation Q6H PRN Steve Tripathi MD      apixaban  5 mg Oral BID Steve Tripathi MD      ascorbic acid  500 mg Oral Daily Steve Tripathi MD      benzonatate  100 mg Oral TID PRN Steve  MD Deuce      Cholecalciferol  1,000 Units Oral Daily Steve Tripathi MD      dextromethorphan-guaiFENesin  10 mL Oral Q4H PRN Odin Castro MD      diltiazem  120 mg Oral Daily Steve Tripathi MD      ezetimibe  10 mg Oral HS Steve Tripathi MD      famotidine  20 mg Oral BID Steve Tripathi MD      flecainide  150 mg Oral BID Steve Tripathi MD      gabapentin  300 mg Oral HS Steve Tripathi MD      insulin glargine  10 Units Subcutaneous HS Steve Tripathi MD      insulin lispro  1-5 Units Subcutaneous TID AC Steve Tripathi MD      levalbuterol  1.25 mg Nebulization TID Fauzia Pennington MD      loratadine  10 mg Oral Daily Steve Tripathi MD      metoprolol succinate  75 mg Oral Q12H Steve Tripathi MD      rOPINIRole  2 mg Oral HS Steve Tripathi MD      sodium chloride  50 mL/hr Intravenous Continuous JENNY Hou      zolpidem  10 mg Oral HS Steve Tripathi MD          Today, Patient Was Seen By: Steve Tripathi MD    **Please Note: This note may have been constructed using a voice recognition system.**

## 2024-08-14 NOTE — ASSESSMENT & PLAN NOTE
Goals:   Pt and Pt daughter Coral requested consult with hospice  Patient is still requiring oxygen and easily out of breath but mentation in at baseline.   Patient does confirm that Coral Garrison is medical and legal POA and would be his healthcare agent in the event he is unable to make his own decisions.  Patient and family a little frustrated that heart failure team did not see them as they were told on Monday 01IQR81 and that the prognosis timeline has not been covered.   Goal remains focused on disease directed cares at this time.  Palliative will follow for ongoing goals of care discussions as situation evolves.  Serious illness conversation completed in the room with patient and daughter and son in law

## 2024-08-14 NOTE — PROGRESS NOTES
General Cardiology   Progress Note -  [facility]   Farnaz Martin 79 y.o. female MRN: [id number]    Unit/Bed#: S -01 Encounter: [id number]    Assessment  1. Acute on chronic HFpEF  -BNP level 1091  -Outpatient diuretic:torsemide 10 mg daily.  -Inpatient diuretic regimen;S/p course of IV diuresis, transitioned to oral Bumex 2 mg on 8/12, held on 8/13 d/t worsening renal function  -24-hour I&O balance; -370 ml, overall -4.3L  -Weight: 160 lbs on 8/11, no SS weight recorded since. SS weight 160 lbs today   -Dry weight: recently 158-160 lbs  2. Recurrent right sided pleural effusion  -Pulmonary following  -S/p IR guided thoracentesis, yield 1.3 L  -CXR in the ED demonstrated mild pulmonary edema, stable R>L pleural effusions  -Repeat CXR 8/12 - large right sided pleural effusion.  3. Paroxysmal atrial fibrillation/flutter and PAT  4. SSS, PPM in situ.  -Prior atrial fibrillation/flutter ablation in the past.  -ECG on admission demonstrated AV paced rhythm.  -Device interrogation 6/17/2024; AT/AF BURDEN SINCE 5/24/24-2.4%.   -On Cardizem  mg daily, flecainide 150 mg twice daily, and metoprolol succinate 75 mg BID  -On Eliquis 5 mg BID  5. HTN  -BP last recorded at 129/86  -On Cardizem  mg daily, metoprolol succinate 75 mg BID  6. HLD  -Lipid profile 7/25/2024; cholesterol 79, triglyceride 55, HDL 28 and LDL calculated 40.  -On Zetia 10 mg daily.  7. Type 2 DM- A1c 8.8%  8. KIMBERLEY superimposed on CKD stage III  -Baseline creatinine 0.8-1.1, recent discharge (7/29) creat level was 1.29  -Creat 1.38 on admission, currently 1.61 (down from 1.68)     Plan  -Pt is very tired and weak this a.m -- possibly 2/2 dehydration/intravascular volume depletion -- similar symptomatology on a prior/recent hospitalization for KIMBERLEY/dehydration. Denies SOB or significant dyspnea. On 3L of supplemental O2 w/sats in the low 90s. No crackles on exam, but right LL is diminished. JVD is present - may be a chronic findining  w/moderate TR. Creat level slowly improving after withholding diuretics but BUN level up slightly. Would continue to hold diuretics. Start gentle IVFs - NSS at 50 ml/hr for 10 hrs.   -Continue Cardizem  mg daily, flecainide 150 mg twice daily, and metoprolol succinate 75 mg BID  -Continue Zetia 10 mg daily  -Continue Eliquis  -No indication for telemetry.     Subjective  Review of Systems   Constitutional: Positive for malaise/fatigue. Negative for chills and fever.   Eyes:  Negative for visual disturbance.   Cardiovascular:  Positive for dyspnea on exertion. Negative for chest pain, leg swelling, orthopnea and palpitations.   Respiratory:  Positive for cough. Negative for shortness of breath.    Gastrointestinal:  Negative for abdominal pain.   Neurological:  Negative for dizziness, headaches and light-headedness.       Objective:   Physical Exam  Vitals and nursing note reviewed.   Constitutional:       Comments: Sleepy   HENT:      Head: Normocephalic and atraumatic.   Eyes:      General: No scleral icterus.  Neck:      Comments: jvd  Cardiovascular:      Rate and Rhythm: Normal rate and regular rhythm.      Pulses: Normal pulses.      Heart sounds: Normal heart sounds.   Pulmonary:      Effort: Pulmonary effort is normal.      Breath sounds: No rales.      Comments: RLL diminished  Abdominal:      Palpations: Abdomen is soft.      Tenderness: There is no abdominal tenderness.   Musculoskeletal:      Right lower leg: No edema.      Left lower leg: No edema.   Skin:     General: Skin is warm and dry.   Neurological:      Mental Status: She is disoriented.   Psychiatric:         Mood and Affect: Mood normal.         Vitals: Blood pressure 140/78, pulse 61, temperature 98.4 °F (36.9 °C), temperature source Oral, resp. rate 18, weight 72.6 kg (160 lb 0.9 oz), last menstrual period 1990, SpO2 95%, not currently breastfeeding.,     Body mass index is 29.27 kg/m².,   Systolic (24hrs), Av , Min:105 ,  Max:140     Diastolic (24hrs), Av, Min:37, Max:78      Intake/Output Summary (Last 24 hours) at 2024 1221  Last data filed at 2024 0600  Gross per 24 hour   Intake 400 ml   Output 550 ml   Net -150 ml     Weight (last 2 days)       Date/Time Weight    24 0600 72.6 (160.05)    24 1015 74 (163.14)    24 0600 78.6 (173.28)    24 0600 77.8 (171.52)            LABORATORY RESULTS      CBC with diff:   Results from last 7 days   Lab Units 24  0628 08/12/24  0620 08/11/24  0605 08/10/24  0333 08/09/24  0624  0546   WBC Thousand/uL 6.59 7.53 5.64 5.55 5.72 6.23   HEMOGLOBIN g/dL 11.6 11.8 11.0* 10.6* 10.5* 11.1*   HEMATOCRIT % 37.4 37.3 34.5* 33.5* 33.0* 34.7*   MCV fL 91 90 90 91 91 90   PLATELETS Thousands/uL 220 242 237 208 225 255   RBC Million/uL 4.09 4.14 3.82 3.70* 3.64* 3.84   MCH pg 28.4 28.5 28.8 28.6 28.8 28.9   MCHC g/dL 31.0* 31.6 31.9 31.6 31.8 32.0   RDW % 14.6 14.6 14.6 14.3 14.4 14.5   MPV fL 10.8 10.7 10.6 10.0 10.3 10.5   NRBC AUTO /100 WBCs 0 0 0 0 0 0       CMP:  Results from last 7 days   Lab Units 249 08/12/24  0620 08/11/24  0605 08/10/24  0333 08/09/24  0630 24  0546   POTASSIUM mmol/L 4.6 5.2 4.7 4.0 3.6 3.3* 3.6   CHLORIDE mmol/L 98 97 98 98 100 98 98   CO2 mmol/L 29 30 28 27 28 30 29   BUN mg/dL 47* 45* 39* 38* 34* 36* 35*   CREATININE mg/dL 1.61* 1.68* 1.44* 1.35* 1.24 1.38* 1.39*   CALCIUM mg/dL 9.3 9.3 9.2 9.0 8.9 8.8 9.0   AST U/L 21  --   --   --   --   --   --    ALT U/L 24  --   --   --   --   --   --    ALK PHOS U/L 70  --   --   --   --   --   --    EGFR ml/min/1.73sq m 29 28 34 37 41 36 35       BMP:  Results from last 7 days   Lab Units 24  0628 24  0449 24  0620 24  0605 08/10/24  0333 24  0630 24  0546   POTASSIUM mmol/L 4.6 5.2 4.7 4.0 3.6 3.3* 3.6   CHLORIDE mmol/L 98 97 98 98 100 98 98   CO2 mmol/L 29 30 28 27 28 30 29   BUN mg/dL 47* 45* 39* 38* 34* 36* 35*    CREATININE mg/dL 1.61* 1.68* 1.44* 1.35* 1.24 1.38* 1.39*   CALCIUM mg/dL 9.3 9.3 9.2 9.0 8.9 8.8 9.0       Lab Results   Component Value Date    NTBNP 761 (H) 2021    NTBNP 2,773 (H) 2021    NTBNP 506 (H) 2019        Results from last 7 days   Lab Units 24  0605 08/10/24  0333   MAGNESIUM mg/dL 1.9 1.9                         Lipid Profile:   Lab Results   Component Value Date    CHOL 205 2015    CHOL 195 07/10/2014     Lab Results   Component Value Date    HDL 28 (L) 2024    HDL 46 (L) 2024    HDL 53 2023     Lab Results   Component Value Date    LDLCALC 40 2024    LDLCALC 64 2024    LDLCALC 96 2023     Lab Results   Component Value Date    TRIG 55 2024    TRIG 63 2024    TRIG 68 2023       Cardiac testing:   Results for orders placed during the hospital encounter of 21    Echo complete with contrast if indicated    [address] [address] [phone]    Transthoracic Echocardiogram  2D, M-mode, Doppler, and Color Doppler    Study date:  2021    Patient: TANISHA LEVINE  MR number: [id number]  Account number: [id number]  : 1945  Age: 75 years  Gender: Female  Status: Inpatient  Location: Bedside  Height: 62 in  Weight: 151.6 lb  BP: 126/ 94 mmHg    Indications: Atrial fibrillation    Diagnoses: I48.0 - Atrial fibrillation    Sonographer:  IGNACIO Cameron  Primary Physician:  Naveen Monsivais MD  Referring Physician:  Shantal Huff MD  Group:  [facility]  Interpreting Physician:  Kwabena Seymour MD    SUMMARY    LEFT VENTRICLE:  Systolic function was normal. Ejection fraction was estimated to be 55 %.  There were no regional wall motion abnormalities.  Wall thickness was at the upper limits of normal.  Doppler parameters were consistent with elevated ventricular end-diastolic filling pressure.    LEFT ATRIUM:  The atrium was  mildly to moderately dilated.    RIGHT ATRIUM:  The atrium was mildly dilated.    MITRAL VALVE:  There was mild stenosis.    TRICUSPID VALVE:  There was mild to moderate regurgitation.  Pulmonary artery systolic pressure was mildly increased.    HISTORY: PRIOR HISTORY: HLD, HTN, PAF, chronic CHF, thyroid nodule    PROCEDURE: The procedure was performed at the bedside. This was a routine study. The transthoracic approach was used. The study included complete 2D imaging, M-mode, complete spectral Doppler, and color Doppler. The heart rate was 101 bpm,  at the start of the study. Images were obtained from the parasternal, apical, subcostal, and suprasternal notch acoustic windows. Echocardiographic views were limited due to lung interference. This was a technically difficult study.    LEFT VENTRICLE: Size was normal. Systolic function was normal. Ejection fraction was estimated to be 55 %. There were no regional wall motion abnormalities. Wall thickness was at the upper limits of normal. DOPPLER: Transmitral flow  pattern: atrial fibrillation. Left ventricular diastolic function parameters were abnormal. Doppler parameters were consistent with elevated ventricular end-diastolic filling pressure.    RIGHT VENTRICLE: The size was normal. Systolic function was normal. Wall thickness was normal.    LEFT ATRIUM: The atrium was mildly to moderately dilated.    RIGHT ATRIUM: The atrium was mildly dilated.    MITRAL VALVE: Valve structure was normal. There was normal leaflet separation. DOPPLER: The transmitral velocity was within the normal range. There was mild stenosis. There was no significant regurgitation.    AORTIC VALVE: The valve was trileaflet. Leaflets exhibited normal thickness and normal cuspal separation. DOPPLER: Transaortic velocity was within the normal range. There was no evidence for stenosis. There was no significant  regurgitation.    TRICUSPID VALVE: The valve structure was normal. There was normal  leaflet separation. DOPPLER: The transtricuspid velocity was within the normal range. There was no evidence for stenosis. There was mild to moderate regurgitation. Pulmonary  artery systolic pressure was mildly increased.    PULMONIC VALVE: Leaflets exhibited normal thickness, no calcification, and normal cuspal separation. DOPPLER: The transpulmonic velocity was within the normal range. There was no significant regurgitation.    PERICARDIUM: There was no pericardial effusion. The pericardium was normal in appearance.    AORTA: The root exhibited normal size.    SYSTEMIC VEINS: IVC: The inferior vena cava was normal in size.    PULMONARY VEINS: DOPPLER: Doppler signals were not recordable in the pulmonary vein(s).    SYSTEM MEASUREMENT TABLES    2D  %FS: 37.15 %  Ao Diam: 2.74 cm  Ao asc: 2.72 cm  EDV(Teich): 103.17 ml  EF(Teich): 67.06 %  ESV(Teich): 33.98 ml  IVSd: 0.94 cm  LA Area: 13.2 cm2  LA Diam: 3.98 cm  LVEDV MOD A4C: 32.01 ml  LVEF MOD A4C: 63.74 %  LVESV MOD A4C: 11.61 ml  LVIDd: 4.72 cm  LVIDs: 2.96 cm  LVLd A4C: 5.9 cm  LVLs A4C: 4.79 cm  LVPWd: 0.93 cm  RA Area: 8.67 cm2  RVIDd: 3.35 cm  SV MOD A4C: 20.4 ml  SV(Teich): 69.19 ml    CW  TR MaxP.39 mmHg  TR Vmax: 2.89 m/s    MM  TAPSE: 1.51 cm    [facility]    Prepared and electronically signed by    Kwabena Seymour MD  Signed 2021 11:05:34    No results found for this or any previous visit.    No results found for this or any previous visit.    No valid procedures specified.  Results for orders placed during the hospital encounter of 10/05/22    NM myocardial perfusion spect (stress and/or rest)    Interpretation Summary    Resting ECG: ECG is abnormal. Resting ECG shows no ST-segment deviation. Patient a paced and V sensed.    Perfusion Defect Conclusion: The stress/rest perfusion ratio is 1.04 . There is no evidence of transient ischemic dilation (TID).    Perfusion: There is a left  ventricular perfusion defect that is small in size with mild reduction in uptake present in the mid to apical anterior and anteroseptal location(s) that is predominantly fixed. The defect appears to be an artifact caused by breast attenuation.    Stress Function: Left ventricular function post-stress is normal. Post-stress ejection fraction is 70 %.    Negative exercise pharmacological stress test      Meds/Allergies   all current active meds have been reviewed and current meds:   Current Facility-Administered Medications   Medication Dose Route Frequency    acetaminophen (TYLENOL) tablet 650 mg  650 mg Oral Q8H PRN    albuterol (PROVENTIL HFA,VENTOLIN HFA) inhaler 2 puff  2 puff Inhalation Q6H PRN    apixaban (ELIQUIS) tablet 5 mg  5 mg Oral BID    ascorbic acid (VITAMIN C) tablet 500 mg  500 mg Oral Daily    benzonatate (TESSALON PERLES) capsule 100 mg  100 mg Oral TID PRN    Cholecalciferol (VITAMIN D3) tablet 1,000 Units  1,000 Units Oral Daily    dextromethorphan-guaiFENesin (ROBITUSSIN DM) oral syrup 10 mL  10 mL Oral Q4H PRN    diltiazem (CARDIZEM CD) 24 hr capsule 120 mg  120 mg Oral Daily    ezetimibe (ZETIA) tablet 10 mg  10 mg Oral HS    famotidine (PEPCID) tablet 20 mg  20 mg Oral BID    flecainide (TAMBOCOR) tablet 150 mg  150 mg Oral BID    gabapentin (NEURONTIN) capsule 300 mg  300 mg Oral HS    insulin glargine (LANTUS) subcutaneous injection 10 Units 0.1 mL  10 Units Subcutaneous HS    insulin lispro (HumALOG/ADMELOG) 100 units/mL subcutaneous injection 1-5 Units  1-5 Units Subcutaneous TID AC    levalbuterol (XOPENEX) inhalation solution 1.25 mg  1.25 mg Nebulization TID    loratadine (CLARITIN) tablet 10 mg  10 mg Oral Daily    metoprolol succinate (TOPROL-XL) 24 hr tablet 75 mg  75 mg Oral Q12H    rOPINIRole (REQUIP) tablet 2 mg  2 mg Oral HS    zolpidem (AMBIEN) tablet 10 mg  10 mg Oral HS            Counseling / Coordination of Care  Total floor / unit time spent today 20 minutes.  Greater than  50% of total time was spent with the patient and / or family counseling and / or coordination of care.      ** Please Note: Dragon 360 Dictation voice to text software may have been used in the creation of this document. **

## 2024-08-14 NOTE — PROGRESS NOTES
Critical access hospital  Progress Note  Name: Farnaz Martin I  MRN: [id number]  Unit/Bed#: S -01 I Date of Admission: 8/6/2024   Date of Service: 8/14/2024 I Hospital Day: 8    Assessment & Plan   Palliative care patient  Assessment & Plan  Social Support:  Supportive listening provided  Normalized experience of patient/family  Provided anxiety containment     Follow-up  We appreciate the opportunity to participate in this patient's care.   We will continue to follow. PSC to follow up.  Please do not hesitate to contact our on-call provider through our clinic answering service at [phone] should there be an acute change or other symptom control concerns.             Decisional apparatus:  Patient with capacity to make medical decisions on my exam today. If such capacity is lost, patient's substitute decision maker would default Coral Garrison patients daughter.           Advance Directive / Living Will / POLST:  None on file - Coral Garrison is legal and medical POA and this was reiterated by patient bedside.      I have reviewed the patient's controlled substance dispensing history in the Prescription Drug Monitoring Program in compliance with the [facility] regulations before prescribing any controlled substances.    Recurrent pleural effusion on right  Assessment & Plan   Recurrent right-sided pleural effusion:     Planning on palliative thoracentesis prior to discharge possible plurex cath     Paroxysmal A-fib (HCC)  Assessment & Plan    SP Albation 2021  PLAN:  Apixaban 5mg PO BID  Flecainede 150mg PO BID  Metoprolol 75mg PO BID  Diltiazem 120mg PO Daily       Cough  Assessment & Plan    ANTI TUSSIVE:   Benzonatate 100mg PO q 3hrs PRN  Dextromethorphan-Guaifenesin PO Syrup 10ml PRN         Elevated serum creatinine  Assessment & Plan  Recent Labs     08/12/24  0620 08/13/24  0449 08/14/24  0628   CREATININE 1.44* 1.68* 1.61*   EGFR 34 28 29     Estimated Creatinine Clearance: 26 mL/min (A) (by C-G  formula based on SCr of 1.61 mg/dL (H)).  POA 1.38; (baseline 1.1-1.28)     Plan:  PO Bumex 2mg twice daily currently on hold  Monitor BMP, IV fluids held  Avoid hypoperfusion of the kidneys, minimize nephrotoxins  Currently at baseline, continue to monitor with daily labs       Goals of care, counseling/discussion  Assessment & Plan  Goals:   Pt and Pt daughter Coral requested consult with hospice  Patient is still requiring oxygen and easily out of breath but mentation in at baseline.   Patient does confirm that Coral Garrison is medical and legal POA and would be his healthcare agent in the event he is unable to make his own decisions.  Patient and family a little frustrated that heart failure team did not see them as they were told on  and that the prognosis timeline has not been covered.   Goal remains focused on disease directed cares at this time.  Palliative will follow for ongoing goals of care discussions as situation evolves.  Serious illness conversation completed in the room with patient and daughter and son in law       * Acute on chronic heart failure (HCC)  Assessment & Plan  Wt Readings from Last 3 Encounters:   24 72.6 kg (160 lb 0.9 oz)   24 72 kg (158 lb 12.8 oz)   24 71.9 kg (158 lb 8.2 oz)     Lab Results   Component Value Date    BNP 1,091 (H) 2024    BNP 1,428 (H) 2024    LVEF 50 2024           Baseline O2 2L  On admission  pt presented w/ acute on chronic respiratory failure w/ hypoxia,  requireing nebs 4L NC oxygen Acute and chronic respiratory failure with hypoxia 2/2 CHF exacerbation a/e/b hypoxia, increased oxygen demands requiring CXR, Nebs, PO bumex and 4L NC oxygen.   SXS: 4 day hx of SOB, dyspnea on exertion, productive cough, weight gain, lower extremity edeam. Could not complete sentences at rest, orthopnea  Recent admission started on 10mg Torsemide PO Daily     [id number] XR chest portable  Airspace disease and pleural  effusion on the right.   [id number] XR chest portable  Persistent large right pleural effusion with right base atelectasis. Pneumonia not excluded in the appropriate clinical setting.   [id number] XR chest portable  No significant change of pleural effusions, pulmonary opacities, or cardiomediastinal silhouette   [id number] XR chest 2 views  Mild pulmonary edema, improved. Grossly stable right greater than left pleural effusions.  [id number] ECHO    Left Ventricle: Left ventricular cavity size is normal. Wall thickness is normal. The left ventricular ejection fraction is 55%. Systolic function is normal. Wall motion is normal.    Right Ventricle: Right ventricular cavity size is normal. Systolic function is normal. A pacer wire is present.    Aortic Valve: There is trace regurgitation.    Mitral Valve: There is moderate to severe regurgitation.    Tricuspid Valve: There is mild to moderate regurgitation. The right ventricular systolic pressure is severely elevated. The estimated right ventricular systolic pressure is 66.00 mmHg.    Pericardium: There is a left pleural effusion.    Prior TTE study available for comparison. Prior study date: 12/5/2023. Changes noted when compared to prior study. Changes include: Mitral regurgitation has worsened.      Plan:  Cardiology on board  Bumex 2mg PO BID on hold d/t renal function  Toprolo 75mg PO BID  Cardiac diet  Symptomatic thoracentesis [id number] with 1300ml drained.  No plurex cath placement pre pulm can be done outpatient.   Currently patient is volume overloaded with intravascular depletion which has been difficult to balance                 24 Hour History  Chart reviewed before visit. Pt seen bedside this morning and talked to daughter Coral on the phone. Pt appears very tired and nods off during conversation. Pt expressed always feeling tired and has no motivations to do anything.        Current pain location(s):   Pain Scale:   Pt did not endorse any acute pain         Review of  Systems   Constitutional:  Positive for fatigue.   Respiratory:  Positive for shortness of breath.    Psychiatric/Behavioral:  Positive for dysphoric mood.          Medications    Current Facility-Administered Medications:     acetaminophen (TYLENOL) tablet 650 mg, 650 mg, Oral, Q8H PRN, Steve Tripathi MD, 650 mg at 08/10/24 1242    albuterol (PROVENTIL HFA,VENTOLIN HFA) inhaler 2 puff, 2 puff, Inhalation, Q6H PRN, Steve Tripathi MD, 2 puff at 08/06/24 1914    apixaban (ELIQUIS) tablet 5 mg, 5 mg, Oral, BID, Steve Tripathi MD, 5 mg at 08/14/24 0926    ascorbic acid (VITAMIN C) tablet 500 mg, 500 mg, Oral, Daily, Steve Tripathi MD, 500 mg at 08/14/24 0926    benzonatate (TESSALON PERLES) capsule 100 mg, 100 mg, Oral, TID PRN, Steve Tripathi MD, 100 mg at 08/12/24 1728    Cholecalciferol (VITAMIN D3) tablet 1,000 Units, 1,000 Units, Oral, Daily, Steve Tripathi MD, 1,000 Units at 08/14/24 0926    dextromethorphan-guaiFENesin (ROBITUSSIN DM) oral syrup 10 mL, 10 mL, Oral, Q4H PRN, Odin Castro MD, 10 mL at 08/13/24 2158    diltiazem (CARDIZEM CD) 24 hr capsule 120 mg, 120 mg, Oral, Daily, Steve Tripathi MD, 120 mg at 08/14/24 0926    ezetimibe (ZETIA) tablet 10 mg, 10 mg, Oral, HS, Steve Tripathi MD, 10 mg at 08/13/24 2158    famotidine (PEPCID) tablet 20 mg, 20 mg, Oral, BID, Steve Tripathi MD, 20 mg at 08/14/24 0926    flecainide (TAMBOCOR) tablet 150 mg, 150 mg, Oral, BID, Steve Tripathi MD, 150 mg at 08/14/24 0926    gabapentin (NEURONTIN) capsule 300 mg, 300 mg, Oral, HS, Steve Tripathi MD, 300 mg at 08/13/24 2158    insulin glargine (LANTUS) subcutaneous injection 10 Units 0.1 mL, 10 Units, Subcutaneous, HS, Steve Tripathi MD, 10 Units at 08/13/24 2159    insulin lispro (HumALOG/ADMELOG) 100 units/mL subcutaneous injection 1-5 Units, 1-5 Units, Subcutaneous, TID AC, 1 Units at 08/13/24 1222 **AND** Fingerstick Glucose (POCT), , , TID AC, Steve Tripathi MD    levalbuterol (XOPENEX) inhalation solution 1.25 mg,  1.25 mg, Nebulization, TID, Fauzia Pennington MD, 1.25 mg at 08/14/24 0738    loratadine (CLARITIN) tablet 10 mg, 10 mg, Oral, Daily, Steve Tripathi MD, 10 mg at 08/10/24 0913    metoprolol succinate (TOPROL-XL) 24 hr tablet 75 mg, 75 mg, Oral, Q12H, Steve Tripathi MD, 75 mg at 08/14/24 0546    rOPINIRole (REQUIP) tablet 2 mg, 2 mg, Oral, HS, Steve Tripathi MD, 2 mg at 08/13/24 2158    zolpidem (AMBIEN) tablet 10 mg, 10 mg, Oral, HS, Steve Tripathi MD, 10 mg at 08/13/24 2158    Objective  /78 (BP Location: Right arm)   Pulse 61   Temp 98.4 °F (36.9 °C) (Oral)   Resp 18   Wt 72.6 kg (160 lb 0.9 oz)   LMP 02/01/1990 (Within Weeks)   SpO2 95%   BMI 29.27 kg/m²   Physical Exam  Constitutional:       General: She is not in acute distress.     Appearance: Normal appearance. She is ill-appearing.   HENT:      Head: Normocephalic and atraumatic.      Mouth/Throat:      Mouth: Mucous membranes are moist.   Eyes:      General: No scleral icterus.        Right eye: No discharge.         Left eye: No discharge.   Cardiovascular:      Rate and Rhythm: Normal rate.   Pulmonary:      Effort: Respiratory distress present.      Breath sounds: Rales (Right lower base) present. No wheezing.   Abdominal:      General: There is no distension.      Palpations: Abdomen is soft.      Tenderness: There is no abdominal tenderness. There is no guarding.   Musculoskeletal:      Right lower leg: No edema.      Left lower leg: No edema.   Skin:     General: Skin is warm.      Capillary Refill: Capillary refill takes less than 2 seconds.   Neurological:      Mental Status: She is alert and oriented to person, place, and time.   Psychiatric:         Attention and Perception: She is inattentive.         Mood and Affect: Mood is depressed.         Behavior: Behavior is withdrawn. Behavior is cooperative.         Thought Content: Thought content is not paranoid or delusional. Thought content does not include homicidal or suicidal  "ideation. Thought content does not include homicidal or suicidal plan.      Comments: Anhedonia            Lab Results: I have personally reviewed pertinent labs., CBC:   Lab Results   Component Value Date    WBC 6.59 08/14/2024    HGB 11.6 08/14/2024    HCT 37.4 08/14/2024    MCV 91 08/14/2024     08/14/2024    RBC 4.09 08/14/2024    MCH 28.4 08/14/2024    MCHC 31.0 (L) 08/14/2024    RDW 14.6 08/14/2024    MPV 10.8 08/14/2024    NRBC 0 08/14/2024   , CMP:   Lab Results   Component Value Date    SODIUM 134 (L) 08/14/2024    K 4.6 08/14/2024    CL 98 08/14/2024    CO2 29 08/14/2024    BUN 47 (H) 08/14/2024    CREATININE 1.61 (H) 08/14/2024    CALCIUM 9.3 08/14/2024    AST 21 08/14/2024    ALT 24 08/14/2024    ALKPHOS 70 08/14/2024    EGFR 29 08/14/2024   , BMP:  Lab Results   Component Value Date    SODIUM 134 (L) 08/14/2024    K 4.6 08/14/2024    CL 98 08/14/2024    CO2 29 08/14/2024    BUN 47 (H) 08/14/2024    CREATININE 1.61 (H) 08/14/2024    GLUC 104 08/14/2024    CALCIUM 9.3 08/14/2024    AGAP 7 08/14/2024    EGFR 29 08/14/2024     EKG, Pathology, and Other Studies: I have personally reviewed pertinent reports.    Counseling / Coordination of Care  Total floor / unit time spent today 35 minutes. Greater than 50% of total time was spent with the patient and / or family counseling and / or coordinating of care. A description of the counseling / coordination of care: Chart reviewed,  provided medical updates, determined goals of care, discussed palliative care and symptom management, discussed code status, discussed comfort care and hospice, provided anticipatory guidance, determined competency and POA/HCA, determined social/family support, provided psychosocial support.         Sandoval Barger MD [facility] PGY III    Portions of this document may have been created using dictation software and as such some \"sound alike\" terms may have been generated by the system. Do not hesitate to contact me with any " questions or clarifications.

## 2024-08-14 NOTE — ASSESSMENT & PLAN NOTE
Lab Results   Component Value Date    LVEF 50 07/14/2024    BNP 1,091 (H) 08/06/2024    BNP 1,428 (H) 07/24/2024     Acute and chronic respiratory failure with hypoxia 2/2 CHF exacerbation a/e/b hypoxia, increased oxygen demands requiring CXR, Nebs, PO bumex and 4L NC oxygen.    Presents with increased shortness of breath, dyspnea on exertion, lower extremity edema, and cough with productive sputum  Patient is currently volume overloaded.  Initial hx: 3 to 4-day history of progressively worsening dyspnea, dry cough, worsening lower extremity edema.  She required increased oxygen when EMS arrived. difficulty talking normal sentences secondary to shortness of breath.  She is now unable to lie flat.  She was discharged on recent admission with 10 mg torsemide daily.  Clinical suspicion is under diuresis leading to volume overload.  Findings: ED provider: She is at baseline on 2 L O2 supplemental O2 and, because of hypoxia to the 80s, was bumped up to 4.  On arrival 97% on 4L (baseline chronic 2L NC)  Currently pt is on room air saturating appopriately for her, diuresed approximately 3.9L of fluid since admission, improving SOB   Was seen by heart failure team 8/12, no recommendations were given    Serial cxr shows stable pleural effusion   Most recent Echo 50% LVEF     Weight (last 2 days)       Date/Time Weight    08/13/24 1015 74 (163.14)    08/13/24 0600 78.6 (173.28)    08/12/24 0600 77.8 (171.52)          I/O: - 3.9 L since admission    Plan:  Per cardiology: PO Bumex 2 mg daily   kdur 20 meq BID. , B-Blocker: Toprol  Cardiac diet, sodium restriction  CMP, magnesium a.m; Goal Mg > 2 and K > 4; Replete prn  Daily standing weights, Measure I/O  Thoracocentesis performed, 1300 mL clear yellow fluid drained from right pleural space.

## 2024-08-15 LAB
ANION GAP SERPL CALCULATED.3IONS-SCNC: 5 MMOL/L (ref 4–13)
BASOPHILS # BLD AUTO: 0.05 THOUSANDS/ÂΜL (ref 0–0.1)
BASOPHILS NFR BLD AUTO: 1 % (ref 0–1)
BUN SERPL-MCNC: 41 MG/DL (ref 5–25)
CALCIUM SERPL-MCNC: 8.9 MG/DL (ref 8.4–10.2)
CHLORIDE SERPL-SCNC: 98 MMOL/L (ref 96–108)
CO2 SERPL-SCNC: 30 MMOL/L (ref 21–32)
CREAT SERPL-MCNC: 1.39 MG/DL (ref 0.6–1.3)
EOSINOPHIL # BLD AUTO: 0.24 THOUSAND/ÂΜL (ref 0–0.61)
EOSINOPHIL NFR BLD AUTO: 4 % (ref 0–6)
ERYTHROCYTE [DISTWIDTH] IN BLOOD BY AUTOMATED COUNT: 14.6 % (ref 11.6–15.1)
GFR SERPL CREATININE-BSD FRML MDRD: 35 ML/MIN/1.73SQ M
GLUCOSE SERPL-MCNC: 136 MG/DL (ref 65–140)
GLUCOSE SERPL-MCNC: 152 MG/DL (ref 65–140)
GLUCOSE SERPL-MCNC: 201 MG/DL (ref 65–140)
GLUCOSE SERPL-MCNC: 210 MG/DL (ref 65–140)
GLUCOSE SERPL-MCNC: 92 MG/DL (ref 65–140)
HCT VFR BLD AUTO: 33.7 % (ref 34.8–46.1)
HGB BLD-MCNC: 10.5 G/DL (ref 11.5–15.4)
IMM GRANULOCYTES # BLD AUTO: 0.02 THOUSAND/UL (ref 0–0.2)
IMM GRANULOCYTES NFR BLD AUTO: 0 % (ref 0–2)
LYMPHOCYTES # BLD AUTO: 1.16 THOUSANDS/ÂΜL (ref 0.6–4.47)
LYMPHOCYTES NFR BLD AUTO: 19 % (ref 14–44)
MCH RBC QN AUTO: 28.4 PG (ref 26.8–34.3)
MCHC RBC AUTO-ENTMCNC: 31.2 G/DL (ref 31.4–37.4)
MCV RBC AUTO: 91 FL (ref 82–98)
MONOCYTES # BLD AUTO: 0.67 THOUSAND/ÂΜL (ref 0.17–1.22)
MONOCYTES NFR BLD AUTO: 11 % (ref 4–12)
NEUTROPHILS # BLD AUTO: 4.03 THOUSANDS/ÂΜL (ref 1.85–7.62)
NEUTS SEG NFR BLD AUTO: 65 % (ref 43–75)
NRBC BLD AUTO-RTO: 0 /100 WBCS
PLATELET # BLD AUTO: 204 THOUSANDS/UL (ref 149–390)
PMV BLD AUTO: 10.3 FL (ref 8.9–12.7)
POTASSIUM SERPL-SCNC: 4.3 MMOL/L (ref 3.5–5.3)
RBC # BLD AUTO: 3.7 MILLION/UL (ref 3.81–5.12)
SODIUM SERPL-SCNC: 133 MMOL/L (ref 135–147)
WBC # BLD AUTO: 6.17 THOUSAND/UL (ref 4.31–10.16)

## 2024-08-15 PROCEDURE — 88112 CYTOPATH CELL ENHANCE TECH: CPT | Performed by: PATHOLOGY

## 2024-08-15 PROCEDURE — 94760 N-INVAS EAR/PLS OXIMETRY 1: CPT

## 2024-08-15 PROCEDURE — 80048 BASIC METABOLIC PNL TOTAL CA: CPT

## 2024-08-15 PROCEDURE — 94640 AIRWAY INHALATION TREATMENT: CPT

## 2024-08-15 PROCEDURE — 85025 COMPLETE CBC W/AUTO DIFF WBC: CPT

## 2024-08-15 PROCEDURE — 99232 SBSQ HOSP IP/OBS MODERATE 35: CPT | Performed by: FAMILY MEDICINE

## 2024-08-15 PROCEDURE — 82948 REAGENT STRIP/BLOOD GLUCOSE: CPT

## 2024-08-15 PROCEDURE — 88305 TISSUE EXAM BY PATHOLOGIST: CPT | Performed by: PATHOLOGY

## 2024-08-15 PROCEDURE — 99232 SBSQ HOSP IP/OBS MODERATE 35: CPT | Performed by: INTERNAL MEDICINE

## 2024-08-15 PROCEDURE — NC001 PR NO CHARGE: Performed by: INTERNAL MEDICINE

## 2024-08-15 RX ADMIN — LORATADINE 10 MG: 10 TABLET ORAL at 08:23

## 2024-08-15 RX ADMIN — EZETIMIBE 10 MG: 10 TABLET ORAL at 21:30

## 2024-08-15 RX ADMIN — APIXABAN 5 MG: 5 TABLET, FILM COATED ORAL at 17:13

## 2024-08-15 RX ADMIN — OXYCODONE HYDROCHLORIDE AND ACETAMINOPHEN 500 MG: 500 TABLET ORAL at 08:23

## 2024-08-15 RX ADMIN — METOPROLOL SUCCINATE 75 MG: 50 TABLET, EXTENDED RELEASE ORAL at 05:53

## 2024-08-15 RX ADMIN — LEVALBUTEROL HYDROCHLORIDE 1.25 MG: 1.25 SOLUTION RESPIRATORY (INHALATION) at 13:45

## 2024-08-15 RX ADMIN — ROPINIROLE HYDROCHLORIDE 2 MG: 1 TABLET, FILM COATED ORAL at 21:30

## 2024-08-15 RX ADMIN — Medication 1000 UNITS: at 08:23

## 2024-08-15 RX ADMIN — APIXABAN 5 MG: 5 TABLET, FILM COATED ORAL at 08:23

## 2024-08-15 RX ADMIN — FAMOTIDINE 20 MG: 20 TABLET, FILM COATED ORAL at 17:13

## 2024-08-15 RX ADMIN — LEVALBUTEROL HYDROCHLORIDE 1.25 MG: 1.25 SOLUTION RESPIRATORY (INHALATION) at 07:53

## 2024-08-15 RX ADMIN — GABAPENTIN 300 MG: 300 CAPSULE ORAL at 21:29

## 2024-08-15 RX ADMIN — INSULIN GLARGINE 10 UNITS: 100 INJECTION, SOLUTION SUBCUTANEOUS at 21:29

## 2024-08-15 RX ADMIN — GUAIFENESIN AND DEXTROMETHORPHAN 10 ML: 100; 10 SYRUP ORAL at 21:29

## 2024-08-15 RX ADMIN — DILTIAZEM HYDROCHLORIDE 120 MG: 120 CAPSULE, COATED, EXTENDED RELEASE ORAL at 08:23

## 2024-08-15 RX ADMIN — FLECAINIDE ACETATE 150 MG: 50 TABLET ORAL at 08:23

## 2024-08-15 RX ADMIN — METOPROLOL SUCCINATE 75 MG: 50 TABLET, EXTENDED RELEASE ORAL at 17:13

## 2024-08-15 RX ADMIN — LEVALBUTEROL HYDROCHLORIDE 1.25 MG: 1.25 SOLUTION RESPIRATORY (INHALATION) at 20:23

## 2024-08-15 RX ADMIN — INSULIN LISPRO 1 UNITS: 100 INJECTION, SOLUTION INTRAVENOUS; SUBCUTANEOUS at 12:32

## 2024-08-15 RX ADMIN — FAMOTIDINE 20 MG: 20 TABLET, FILM COATED ORAL at 08:23

## 2024-08-15 RX ADMIN — FLECAINIDE ACETATE 150 MG: 50 TABLET ORAL at 17:13

## 2024-08-15 RX ADMIN — INSULIN LISPRO 1 UNITS: 100 INJECTION, SOLUTION INTRAVENOUS; SUBCUTANEOUS at 17:15

## 2024-08-15 RX ADMIN — ZOLPIDEM TARTRATE 10 MG: 5 TABLET, COATED ORAL at 21:30

## 2024-08-15 NOTE — PLAN OF CARE

## 2024-08-15 NOTE — ASSESSMENT & PLAN NOTE
Recent Labs     08/13/24  0449 08/14/24  0628 08/15/24  0655   CREATININE 1.68* 1.61* 1.39*   EGFR 28 29 35     Estimated Creatinine Clearance: 30.2 mL/min (A) (by C-G formula based on SCr of 1.39 mg/dL (H)).  POA 1.38; (baseline 1.1-1.28)     Plan:  PO Bumex 2mg twice daily currently on hold  Monitor BMP, IV fluids for gentle hydration  Avoid hypoperfusion of the kidneys, minimize nephrotoxins  Currently at baseline, continue to monitor with daily labs

## 2024-08-15 NOTE — PLAN OF CARE

## 2024-08-15 NOTE — PROGRESS NOTES
Patient:  TANISHA LEVINE    MRN:  [id number]    Aidin Request ID:  [id number]    Level of care reserved:  Skilled Nursing Facility    Partner Reserved:  [facility] [place], [place] [address] [phone]    Clinical needs requested:    Geography searched:  10 miles around [id number]    Start of Service:    Request sent:  3:07pm EDT on 8/15/2024 by Shaniqua Vasquez    Partner reserved:  3:45pm EDT on 8/15/2024 by Shaniqua Vasquez    Choice list shared:  3:45pm EDT on 8/15/2024 by Shaniqua Vasquez

## 2024-08-15 NOTE — ASSESSMENT & PLAN NOTE
Pt endorsing orthopnea, SOB  Productive in nature without production of sputum  Uses Spiriva inhaler and Robitussin at home for sx's  R/p chest x-ray shows increased congestion, persistent pleural effusion that is worsening on chest x-rays  Cough component likely CHF exacerbation  Pulmonology was consulted, recommended no procedural interventions at this time, but recommended   -Continue XOPENEX inhalation solution 1.25 mg   -Continue home inhalers and nebulizer treatment on discharge.   -Possible home oxygen evaluation   - s/p thoracocentesis 8/13, fatigue and lethargy improved.      Plan:  Atrovent neb four times daily  XOPENEX inhalation solution 1.25 mg   Continue home inhalers and nebulizer treatment on discharge.   Possible home oxygen evaluation   Thoracocentesis performed, 1300 mL clear yellow fluid drained from right pleural space.   Monitor oxygen status today, wean as tolerated

## 2024-08-15 NOTE — ASSESSMENT & PLAN NOTE
Lab Results   Component Value Date    LVEF 50 07/14/2024    BNP 1,091 (H) 08/06/2024    BNP 1,428 (H) 07/24/2024     Acute and chronic respiratory failure with hypoxia 2/2 CHF exacerbation a/e/b hypoxia, increased oxygen demands requiring CXR, Nebs, PO bumex and 4L NC oxygen.    Presents with increased shortness of breath, dyspnea on exertion, lower extremity edema, and cough with productive sputum  Patient is currently volume overloaded.  Initial hx: 3 to 4-day history of progressively worsening dyspnea, dry cough, worsening lower extremity edema.  She required increased oxygen when EMS arrived. difficulty talking normal sentences secondary to shortness of breath.  She is now unable to lie flat.  She was discharged on recent admission with 10 mg torsemide daily.  Clinical suspicion is under diuresis leading to volume overload.  Findings: ED provider: She is at baseline on 2 L O2 supplemental O2 and, because of hypoxia to the 80s, was bumped up to 4.  On arrival 97% on 4L (baseline chronic 2L NC)  Currently pt is on room air saturating appopriately for her, diuresed approximately 3.9L of fluid since admission, improving SOB   Was seen by heart failure team 8/12, no recommendations were given    Serial cxr shows stable pleural effusion   Most recent Echo 50% LVEF     Weight (last 2 days)       Date/Time Weight    08/15/24 05:50:45 73 (160.94)    08/14/24 0600 72.6 (160.05)    08/13/24 1015 74 (163.14)    08/13/24 0600 78.6 (173.28)          I/O: - 3.9 L since admission    Plan:  Per cardiology: PO Bumex 2 mg daily, however it was held in the setting of rising creatinine.  Plan is to reinitiate today  kdur 20 meq BID. , B-Blocker: Toprol  Cardiac diet, sodium restriction  CMP, magnesium a.m; Goal Mg > 2 and K > 4; Replete prn  Daily standing weights, Measure I/O  Thoracocentesis performed, 1300 mL clear yellow fluid drained from right pleural space.   Discussed with cardiology and appropriate maintenance diuretic  dose that is adequate for discharge.

## 2024-08-15 NOTE — HOSPICE NOTE
Met with pt and her daughter at bedside. Reviewed hospice care and services per Medicare guidelines. They are in agreement with the same. Daughter toured [facility] today and they are seeking placement there with hospice. They are hoping for a pleurex cath do be placed. Hospice will follow to discharge. Will obtain consents when we know exact dc location and discharge. Hospice information left with daughter. Update CM Shaniqua of the same.      Update pt is going to [facility] on [facility] with a transition to hospice. Daughter has my card and will call when ready for hospice.

## 2024-08-15 NOTE — PROGRESS NOTES
Progress Note - Cardiology   Farnaz Martin 79 y.o. female MRN: [id number]  Unit/Bed#: S -01 Encounter: [id number]    Assessment:  #. Acute on chronic HFpEF  #. Recurrent R pleural effusion  #. PAF  #. SSS s/p PPM  #. KIMBERLEY    Plan:  -Continue to hold loop diuretic today, tentatively to begin tomorrow if renal function stable  -Continue remainder of cardiac meds  -Reviewed IR plans for R tunneled pleural catheter on 8/19    Subjective/Objective     Subjective: Patient seen & examined. No acute events overnight.    Objective:   Vitals: /65   Pulse 63   Temp 97.9 °F (36.6 °C)   Resp 18   Wt 73 kg (160 lb 15 oz)   LMP 02/01/1990 (Within Weeks)   SpO2 99%   BMI 29.44 kg/m²   Vitals:    08/14/24 0600 08/15/24 0550   Weight: 72.6 kg (160 lb 0.9 oz) 73 kg (160 lb 15 oz)     Orthostatic Blood Pressures      Flowsheet Row Most Recent Value   Blood Pressure 116/65 filed at 08/15/2024 1713   Patient Position - Orthostatic VS Lying filed at 08/15/2024 0815              Intake/Output Summary (Last 24 hours) at 8/15/2024 1731  Last data filed at 8/15/2024 1300  Gross per 24 hour   Intake 920 ml   Output 350 ml   Net 570 ml       Invasive Devices       Peripheral Intravenous Line  Duration             Peripheral IV 08/13/24 Dorsal (posterior);Left Forearm 2 days                  Physical Exam: General appearance: alert and oriented, in no acute distress  Lungs: R base dull, no wheezing, no rhonchi   Heart: RRR, no murmur, no gallop  Extremities: extremities normal, warm and well-perfused; no cyanosis, clubbing, or edema  Skin: warm & dry    Lab Results: I have personally reviewed pertinent lab results.    Imaging: I have personally reviewed pertinent reports.    EKG: Personally reviewed

## 2024-08-15 NOTE — ASSESSMENT & PLAN NOTE
Wt Readings from Last 3 Encounters:   08/15/24 73 kg (160 lb 15 oz)   24 72 kg (158 lb 12.8 oz)   24 71.9 kg (158 lb 8.2 oz)     Lab Results   Component Value Date    BNP 1,091 (H) 2024    BNP 1,428 (H) 2024    LVEF 50 2024           Baseline O2 2L  On admission [id number] pt presented w/ acute on chronic respiratory failure w/ hypoxia,  requireing nebs 4L NC oxygen Acute and chronic respiratory failure with hypoxia 2/2 CHF exacerbation a/e/b hypoxia, increased oxygen demands requiring CXR, Nebs, PO bumex and 4L NC oxygen.   SXS: 4 day hx of SOB, dyspnea on exertion, productive cough, weight gain, lower extremity edeam. Could not complete sentences at rest, orthopnea  Recent admission started on 10mg Torsemide PO Daily     [id number] XR chest portable  Airspace disease and pleural effusion on the right.   [id number] XR chest portable  Persistent large right pleural effusion with right base atelectasis. Pneumonia not excluded in the appropriate clinical setting.   [id number] XR chest portable  No significant change of pleural effusions, pulmonary opacities, or cardiomediastinal silhouette   [id number] XR chest 2 views  Mild pulmonary edema, improved. Grossly stable right greater than left pleural effusions.  [id number] ECHO  •  Left Ventricle: Left ventricular cavity size is normal. Wall thickness is normal. The left ventricular ejection fraction is 55%. Systolic function is normal. Wall motion is normal.  •  Right Ventricle: Right ventricular cavity size is normal. Systolic function is normal. A pacer wire is present.  •  Aortic Valve: There is trace regurgitation.  •  Mitral Valve: There is moderate to severe regurgitation.  •  Tricuspid Valve: There is mild to moderate regurgitation. The right ventricular systolic pressure is severely elevated. The estimated right ventricular systolic pressure is 66.00 mmHg.  •  Pericardium: There is a left pleural effusion.  •  Prior TTE study available for  comparison. Prior study date: 12/5/2023. Changes noted when compared to prior study. Changes include: Mitral regurgitation has worsened.      Plan:  Cardiology on board  Bumex 2mg PO BID on hold d/t renal function  Toprolo 75mg PO BID  Cardiac diet  Symptomatic thoracentesis [id number] with 1300ml drained.  No plurex cath placement pre pulm can be done outpatient.   Currently patient is volume overloaded with intravascular depletion which has been difficult to balance

## 2024-08-15 NOTE — PROGRESS NOTES
[facility]  Progress Note  Name: Farnaz Martin I  MRN: [id number]  Unit/Bed#: S -01 I Date of Admission: 8/6/2024   Date of Service: 8/15/2024 I Hospital Day: 9    Assessment & Plan   * Acute on chronic heart failure (HCC)  Assessment & Plan    Lab Results   Component Value Date    LVEF 50 07/14/2024    BNP 1,091 (H) 08/06/2024    BNP 1,428 (H) 07/24/2024     Acute and chronic respiratory failure with hypoxia 2/2 CHF exacerbation a/e/b hypoxia, increased oxygen demands requiring CXR, Nebs, PO bumex and 4L NC oxygen.    Presents with increased shortness of breath, dyspnea on exertion, lower extremity edema, and cough with productive sputum  Patient is currently volume overloaded.  Initial hx: 3 to 4-day history of progressively worsening dyspnea, dry cough, worsening lower extremity edema.  She required increased oxygen when EMS arrived. difficulty talking normal sentences secondary to shortness of breath.  She is now unable to lie flat.  She was discharged on recent admission with 10 mg torsemide daily.  Clinical suspicion is under diuresis leading to volume overload.  Findings: ED provider: She is at baseline on 2 L O2 supplemental O2 and, because of hypoxia to the 80s, was bumped up to 4.  On arrival 97% on 4L (baseline chronic 2L NC)  Currently pt is on room air saturating appopriately for her, diuresed approximately 3.9L of fluid since admission, improving SOB   Was seen by heart failure team 8/12, no recommendations were given    Serial cxr shows stable pleural effusion   Most recent Echo 50% LVEF     Weight (last 2 days)       Date/Time Weight    08/15/24 05:50:45 73 (160.94)    08/14/24 0600 72.6 (160.05)    08/13/24 1015 74 (163.14)    08/13/24 0600 78.6 (173.28)          I/O: - 3.9 L since admission    Plan:  Per cardiology: PO Bumex 2 mg daily   kdur 20 meq BID. , B-Blocker: Toprol  Cardiac diet, sodium restriction  CMP, magnesium a.m; Goal Mg > 2 and K > 4; Replete  prn  Daily standing weights, Measure I/O  Thoracocentesis performed, 1300 mL clear yellow fluid drained from right pleural space.           Elevated serum creatinine  Assessment & Plan    Recent Labs     08/13/24  0449 08/14/24  0628 08/15/24  0655   CREATININE 1.68* 1.61* 1.39*   EGFR 28 29 35     Estimated Creatinine Clearance: 30.2 mL/min (A) (by C-G formula based on SCr of 1.39 mg/dL (H)).    Elevated creatinine, continue to monitor    Plan:  PO Bumex 2mg daily  Monitor BMP, gentle NS at 50cc/hr for 10 hours per cardiology  Avoid hypoperfusion of the kidneys, minimize nephrotoxins  Currently at baseline, continue to monitor with daily labs    Cough  Assessment & Plan  Pt endorsing orthopnea, SOB  Productive in nature without production of sputum  Uses Spiriva inhaler and Robitussin at home for sx's  R/p chest x-ray shows increased congestion, persistent pleural effusion that is worsening on chest x-rays  Cough component likely CHF exacerbation  Pulmonology was consulted, recommended no procedural interventions at this time, but recommended   -Continue XOPENEX inhalation solution 1.25 mg   -Continue home inhalers and nebulizer treatment on discharge.   -Possible home oxygen evaluation   - s/p thoracocentesis 8/13, fatigue and lethargy improved.      Plan:  Atrovent neb four times daily  XOPENEX inhalation solution 1.25 mg   Continue home inhalers and nebulizer treatment on discharge.   Possible home oxygen evaluation   Thoracocentesis performed, 1300 mL clear yellow fluid drained from right pleural space.   Monitor oxygen status today, wean as tolerated      Paroxysmal A-fib (HCC)  Assessment & Plan  Afib-  S/p ablation in 03/2021.   EKG- AV dual-paced rhythm, Vent. rate has decreased by 14 BPM since prior EKG  Low voltage QRS    Plan:  Eliquis 5 mg twice daily  Continue flecainide 150 mg twice daily and metoprolol succinate 75 mg twice daily  Continue diltiazem 120 mg daily    Palliative care patient  Assessment  & Plan  Pt was consulted by palliative care and daughter was present in room as POA, discussed hospice topic as her condition is a stepwise progression.  Continued discussion with goals of care going forward as pt's prognosis progresses    Recurrent pleural effusion on right  Assessment & Plan  Thoracocentesis performed, 1300 mL clear yellow fluid drained from right pleural space.   Pulm does not recommend pleural catheter at this time.     Goals of care, counseling/discussion  Assessment & Plan  Discussion with home hospice, daughter will discuss with patient.  They are leaning toward home hospice at some point, but this admission is not likely              VTE Pharmacologic Prophylaxis: VTE Score: 5 High Risk (Score >/= 5) - Pharmacological DVT Prophylaxis Ordered: apixaban (Eliquis). Sequential Compression Devices Ordered.    Mobility:   Basic Mobility Inpatient Raw Score: 23  JH-HLM Goal: 7: Walk 25 feet or more  JH-HLM Achieved: 6: Walk 10 steps or more  JH-HLM Goal achieved. Continue to encourage appropriate mobility.    Patient Centered Rounds: I performed bedside rounds with nursing staff today.   Discussions with Specialists or Other Care Team Provider: Cardiology, pulmonology    Education and Discussions with Family / Patient: Updated  (daughter) via phone.    Total Time Spent on Date of Encounter in care of patient: 60 mins. This time was spent on one or more of the following: performing physical exam; counseling and coordination of care; obtaining or reviewing history; documenting in the medical record; reviewing/ordering tests, medications or procedures; communicating with other healthcare professionals and discussing with patient's family/caregivers.    Current Length of Stay: 9 day(s)  Current Patient Status: Inpatient   Certification Statement: The patient will continue to require additional inpatient hospital stay due to CHF exacerbation requiring diuresis, increased oxygen  requirements  Discharge Plan: Anticipate discharge in 24-48 hrs to prior assisted or independent living facility.    Code Status: Level 3 - DNAR and DNI    Subjective:   Today patient at bedside, no acute events overnight.  This morning, patient appears improved in her lethargy and fatigue.  She is sitting upright, conversing appropriately and eating breakfast.  She notes her cough is improved and her breathing is well-tolerated.  Discussed plan of care for today, all questions and concerns were answered at bedside.    Objective:     Vitals:   Temp (24hrs), Av.8 °F (37.1 °C), Min:98.2 °F (36.8 °C), Max:99.3 °F (37.4 °C)    Temp:  [98.2 °F (36.8 °C)-99.3 °F (37.4 °C)] 99 °F (37.2 °C)  HR:  [60] 60  Resp:  [16-18] 16  BP: (102-131)/() 102/54  SpO2:  [90 %-100 %] 97 %  Body mass index is 29.44 kg/m².     Input and Output Summary (last 24 hours):     Intake/Output Summary (Last 24 hours) at 8/15/2024 1213  Last data filed at 8/15/2024 0900  Gross per 24 hour   Intake 540 ml   Output 550 ml   Net -10 ml       Physical Exam:   Physical Exam  Vitals and nursing note reviewed.   Constitutional:       General: She is not in acute distress.     Appearance: She is well-developed.   HENT:      Head: Normocephalic and atraumatic.   Eyes:      Conjunctiva/sclera: Conjunctivae normal.   Cardiovascular:      Rate and Rhythm: Normal rate and regular rhythm.      Heart sounds: Murmur heard.   Pulmonary:      Effort: Pulmonary effort is normal. No respiratory distress.      Breath sounds: Normal breath sounds.      Comments: Right lower lobe diminished, but improved aeration in upper lobe   Abdominal:      Palpations: Abdomen is soft.      Tenderness: There is no abdominal tenderness.   Musculoskeletal:         General: No swelling.      Cervical back: Neck supple.   Skin:     General: Skin is warm and dry.      Capillary Refill: Capillary refill takes less than 2 seconds.   Neurological:      Mental Status: She is alert.    Psychiatric:         Mood and Affect: Mood normal.          Additional Data:     Labs:  Results from last 7 days   Lab Units 08/15/24  0655   WBC Thousand/uL 6.17   HEMOGLOBIN g/dL 10.5*   HEMATOCRIT % 33.7*   PLATELETS Thousands/uL 204   SEGS PCT % 65   LYMPHO PCT % 19   MONO PCT % 11   EOS PCT % 4     Results from last 7 days   Lab Units 08/15/24  0655 08/14/24  0628   SODIUM mmol/L 133* 134*   POTASSIUM mmol/L 4.3 4.6   CHLORIDE mmol/L 98 98   CO2 mmol/L 30 29   BUN mg/dL 41* 47*   CREATININE mg/dL 1.39* 1.61*   ANION GAP mmol/L 5 7   CALCIUM mg/dL 8.9 9.3   ALBUMIN g/dL  --  3.8   TOTAL BILIRUBIN mg/dL  --  0.95   ALK PHOS U/L  --  70   ALT U/L  --  24   AST U/L  --  21   GLUCOSE RANDOM mg/dL 92 104         Results from last 7 days   Lab Units 08/15/24  1147 08/15/24  0817 08/14/24  2105 08/14/24  1527 08/14/24  1139 08/14/24  0720 08/13/24  2118 08/13/24  1517 08/13/24  1114 08/13/24  0737 08/12/24  2059 08/12/24  1936   POC GLUCOSE mg/dl 152* 136 170* 83 111 88 169* 130 210* 115 251* 219*               Lines/Drains:  Invasive Devices       Peripheral Intravenous Line  Duration             Peripheral IV 08/13/24 Dorsal (posterior);Left Forearm 1 day                          Imaging: Reviewed radiology reports from this admission including: chest xray and IR thoracentesis     Recent Cultures (last 7 days):   Results from last 7 days   Lab Units 08/13/24  1259   GRAM STAIN RESULT  1+ Polys  No bacteria seen   BODY FLUID CULTURE, STERILE  No growth       Last 24 Hours Medication List:   Current Facility-Administered Medications   Medication Dose Route Frequency Provider Last Rate    acetaminophen  650 mg Oral Q8H PRN Steve Tripathi MD      albuterol  2 puff Inhalation Q6H PRN Steve Tripathi MD      apixaban  5 mg Oral BID Steve Tripathi MD      ascorbic acid  500 mg Oral Daily Steve Tripathi MD      benzonatate  100 mg Oral TID PRN Steve Tripathi MD      Cholecalciferol  1,000 Units Oral Daily Steve Tripathi MD       dextromethorphan-guaiFENesin  10 mL Oral Q4H PRN Odin Castro MD      diltiazem  120 mg Oral Daily Steve Tripathi MD      ezetimibe  10 mg Oral HS Steve Tripathi MD      famotidine  20 mg Oral BID Steve Tripathi MD      flecainide  150 mg Oral BID Steve Tripathi MD      gabapentin  300 mg Oral HS Steve Tripathi MD      insulin glargine  10 Units Subcutaneous HS Steve Tripathi MD      insulin lispro  1-5 Units Subcutaneous TID AC Steve Tripathi MD      levalbuterol  1.25 mg Nebulization TID Fauzia Pennington MD      loratadine  10 mg Oral Daily Steve Tripathi MD      metoprolol succinate  75 mg Oral Q12H Steve Tripathi MD      rOPINIRole  2 mg Oral HS Steve Tripathi MD      zolpidem  10 mg Oral HS Steve Tripathi MD          Today, Patient Was Seen By: Steve Tripathi MD    **Please Note: This note may have been constructed using a voice recognition system.**

## 2024-08-15 NOTE — CASE MANAGEMENT
Case Management Discharge Planning Note    Patient name Farnaz Martin  Location S /S -01 MRN [id number]  : 1945 Date 8/15/2024       Current Admission Date: 2024  Current Admission Diagnosis:Acute on chronic heart failure (HCC)   Patient Active Problem List    Diagnosis Date Noted Date Diagnosed    Palliative care patient 2024     Shortness of breath 2024     Thyroid nodule 2024     Acute kidney injury superimposed on CKD (chronic kidney disease) stage 3, GFR 30-59 ml/min ([facility]) 2024     Fatigue 2024     Recurrent pleural effusion on right 2024     Syncope and collapse 2024     Fall 2024     Type 2 diabetes mellitus with microalbuminuria, without long-term current use of insulin ([facility]) 2024     Paroxysmal A-fib ([facility]) 2024     Ambulatory dysfunction 2024     Hyponatremia 2024     Left renal mass 2024     Cough 2024     Bronchiectasis without complication ([facility]) 2024     Multiple thyroid nodules 2024     Abnormal CT of the chest 2023     S/P revision of total knee, left 2023    Nonrheumatic mitral valve regurgitation 2023     Acute on chronic heart failure ([facility]) 2022     Elevated serum creatinine 2022     Meningioma ([facility]) 2022     Chronic tension-type headache, not intractable 2022     Vasomotor rhinitis 2021     MGUS (monoclonal gammopathy of unknown significance) 2021     Hurthle cell adenoma of thyroid 2021     Tremors of nervous system 2021     Hx of diplopia 2021     S/P placement of cardiac pacemaker 2021     History of sick sinus syndrome s/p PPM 2021     Current use of long term anticoagulation 2021     Malignant neoplasm of thyroid gland (HCC) 2021     Chronic heart failure with preserved ejection fraction ([facility]) 2021     Goals of care, counseling/discussion 2020     Chronic  rhinitis 11/10/2020     Arthritis of both acromioclavicular joints 03/06/2020     Carpal tunnel syndrome on right 11/01/2019     Osteoarthritis of shoulder      TMJ syndrome 10/18/2017     Essential hypertension 04/19/2016     Peripheral neuropathy 03/07/2016     Lumbar spondylosis 06/29/2015     Lumbar stenosis 06/29/2015     Restless legs syndrome 07/09/2014     Allergic rhinitis 04/01/2013     Osteoarthritis of knee 04/01/2013     Insomnia 01/04/2013     Osteopenia 11/26/2012     Fibromyalgia 10/16/2012     Hyperlipidemia 10/16/2012     Osteoarthrosis, hand 10/16/2012     Vitamin D deficiency 10/16/2012       LOS (days): 9  Geometric Mean LOS (GMLOS) (days): 3.9  Days to GMLOS:-5.1     OBJECTIVE:  Risk of Unplanned Readmission Score: 44.92         Current admission status: Inpatient   Preferred Pharmacy:   [id number] - [place] - [address]  [facility]  [place] [address]  Phone: [phone] Fax: [phone]    [facility] [address] [place] [address]  [address] [facility]  [address]  [facility] [address]  Phone: [phone] Fax: [phone]    Primary Care Provider: Naveen Monsivais MD    Primary Insurance: MEDICARE  Secondary Insurance: [facility]    DISCHARGE DETAILS:    Discharge planning discussed with:: patient's daughter Ynes  Freedom of Choice: Yes  Comments - Freedom of Choice: Per Hospice liaison Farnaz and FLAKITA attending [facility] - patient and dtr choosing to continue with STR and daughter now choosing for patient to go to [facility]. CM f/u with pt's dtr Ynes in person of same. Dtr confirmed she has been in contact with [facility] for STR and transition to LTC for hospice care at a later date. CM placed referral to [facility] via aidin - facility confirmed pt accepted to facility when ready for d/c. CM provided dcp update/change to [facility] via aidin. [facility] reserved in aidin and referral to [facility] canceled in aidin. CM to continue to follow for  coordination of pt transport to [facility] when ready for d/c.  CM contacted family/caregiver?: Yes  Were Treatment Team discharge recommendations reviewed with patient/caregiver?: Yes  Did patient/caregiver verbalize understanding of patient care needs?: N/A- going to facility  Were patient/caregiver advised of the risks associated with not following Treatment Team discharge recommendations?: Yes    Contacts  Patient Contacts: Ynes (dtr)  Relationship to Patient:: Family  Contact Method: In Person  Reason/Outcome: Continuity of Care, Emergency Contact, Referral, Discharge Planning    Requested Home Health Care         Is the patient interested in HHC at discharge?: No    DME Referral Provided  Referral made for DME?: No    Other Referral/Resources/Interventions Provided:  Interventions: Short Term Rehab  Referral Comments: [facility] reserved in aidin. DCP update provided to [facility] STR via aidin and referral canced to S.         Treatment Team Recommendation: Short Term Rehab  Discharge Destination Plan:: Short Term Rehab  Transport at Discharge : [facility] Ambulance

## 2024-08-15 NOTE — TELEMEDICINE
e-Consult (IPC)  - Interventional Radiology  Farnaz Martin 79 y.o. female MRN: [id number]  Unit/Bed#: S -01 Encounter: [id number]          Interventional Radiology has been consulted to evaluate Farnaz Martin    We were consulted by medicine service concerning this patient with recurrent right pleural effusion.    Inpatient Consult to IR  Consult performed by: Xavi Torrez MD  Consult ordered by: Steve Tripathi MD        08/15/24    Assessment/Recommendation:   79 year-old female with history of CHF and recurrent right pleural effusion, now with request for tunneled pleural catheter placement.    We will tentatively plan for right-sided tunneled pleural catheter placement for Monday 8/19. Please make patient NPO midnight prior and confirm scheduling availability Monday morning. There is no need to hold anti-coagulation for this procedure. PT/INR will be ordered to be drawn that morning.    >5 min, >50% time spent reviewing/analyzing record, written report will be generated in the EMR.     Thank you for allowing Interventional Radiology to participate in the care of Farnaz Martin. Please don't hesitate to call or TigerText us with any questions.     Xavi Torrez MD

## 2024-08-15 NOTE — ASSESSMENT & PLAN NOTE
Goals:   Pt and Pt daughter Coral requested consult with hospice  Pt previously approved for [facility] hospice. Meeting at 13:00 [id number] with hospice patient and daughter Coral  Patient is still requiring oxygen and easily out of breath but mentation in at baseline.   Patient does confirm that Coral Garrison is medical and legal POA and would be his healthcare agent in the event he is unable to make his own decisions.  Patient and family a little frustrated that heart failure team did not see them as they were told on Monday [id number] and that the prognosis timeline has not been covered.   Goal remains focused on disease directed cares at this time.  Palliative will follow for ongoing goals of care discussions as situation evolves.  Serious illness conversation completed in the room with patient and daughter and son in law

## 2024-08-15 NOTE — ACP (ADVANCE CARE PLANNING)
Advanced Care Planning Progress Note    Serious Illness Conversation    1. What is your understanding now of where you are with your illness?  Prognostic Understanding: no understanding of prognosis  Patient aware that due to her heart condition her lungs will continue to fill with fluid     2. How much information about what is likely to be ahead with your illness would you like to have?  Information: patient wants to be fully informed     3. What did you (clinician) communicate to the patient?  Prognostic Communication: Uncertain - It can be difficult to predict what will happen with your illness. I hope you will continue to live well for a long time but I’m worried that you could get sick quickly, and I think it is important to prepare for that possibility.  Pt unsure     4. If your health situation worsens, what are your most important goals?  Goals: be at home, live as long as possible, no matter what  Live as long as she can wtihout pain.      5. What are the biggest fears and worries about the future and your health?  Fears/Worries: preparing for death, loss of mobility, finances, burdening others, being a physical burden, being a financial burden, being dependent, emotional concerns, loss of dignity, pain, spiritual distress, other symptoms, loss of control, concerns about the meaning of life, being an emotional burden, ability to care for others - children, ill spouse     6. What abilities are so critical to your life that you cannot imagine living without them?  Unacceptable Function: not being able to care for myself, including toileting and feeding, being unable to communicate effectively, being unable to talk     7. What gives you strength as you think about the future with your illness?  God,      8. If you become sicker, how much are you willing to go through for the possibility of gaining more time?  Be in the hospital: No Have a feeding tube: No   Be in the ICU: No Live in a nursing home: No   Be on  a ventilator: No Be uncomfortable: No   Be on dialysis: No Undergo aggressive test and/or procedures: No   9. How much does your proxy and family know about your priorities and wishes?  Daughter and brother in law are informed.      I’ve heard you say that being home, and not being a burden on family, being your self and being able to communicate is really important to you. Keeping that in mind, and what we know about your illness, I recommend that we keeping the conversation ongoing and speak with hospice josefa. This will help us make sure that your treatment plans reflect what’s important to you.     How does this plan sound to you? I will do everything I can to help you through this.  Patient verbalized understanding of the plan     I have spent 30minutes speaking with my patient on advanced care planning today or during this visit     Advanced directives         Sandoval Barger MD

## 2024-08-15 NOTE — PROGRESS NOTES
[facility]  Progress Note  Name: Farnaz Martin I  MRN: [id number]  Unit/Bed#: S -01 I Date of Admission: 8/6/2024   Date of Service: 8/15/2024 I Hospital Day: 9    Assessment & Plan   Palliative care patient  Assessment & Plan  Social Support:  Supportive listening provided  Normalized experience of patient/family  Provided anxiety containment     Follow-up  We appreciate the opportunity to participate in this patient's care.   We will continue to follow. PSC to follow up.  Please do not hesitate to contact our on-call provider through our clinic answering service at [phone] should there be an acute change or other symptom control concerns.             Decisional apparatus:  Patient with capacity to make medical decisions on my exam today. If such capacity is lost, patient's substitute decision maker would default Coral Garrison patients daughter.           Advance Directive / Living Will / POLST:  None on file - Coral Garrison is legal and medical POA and this was reiterated by patient bedside.      I have reviewed the patient's controlled substance dispensing history in the Prescription Drug Monitoring Program in compliance with the [facility] regulations before prescribing any controlled substances.    Recurrent pleural effusion on right  Assessment & Plan   Recurrent right-sided pleural effusion:     S/P Thoracentesis [id number] w/ 1300ml removed No plurex placed can be considered outpatient    Paroxysmal A-fib (HCC)  Assessment & Plan    SP Albation 2021  PLAN:  Apixaban 5mg PO BID  Flecainede 150mg PO BID  Metoprolol 75mg PO BID  Diltiazem 120mg PO Daily       Cough  Assessment & Plan    ANTI TUSSIVE:   Benzonatate 100mg PO q 3hrs PRN  Dextromethorphan-Guaifenesin PO Syrup 10ml PRN         Elevated serum creatinine  Assessment & Plan  Recent Labs     08/13/24  0449 08/14/24  0628 08/15/24  0655   CREATININE 1.68* 1.61* 1.39*   EGFR 28 29 35     Estimated Creatinine Clearance: 30.2  mL/min (A) (by C-G formula based on SCr of 1.39 mg/dL (H)).  POA 1.38; (baseline 1.1-1.28)     Plan:  PO Bumex 2mg twice daily currently on hold  Monitor BMP, IV fluids for gentle hydration  Avoid hypoperfusion of the kidneys, minimize nephrotoxins  Currently at baseline, continue to monitor with daily labs       Goals of care, counseling/discussion  Assessment & Plan  Goals:   Pt and Pt daughter Coral requested consult with hospice  Pt previously approved for [facility] hospice. Meeting at 13:00 15AUG24 with hospice patient and daughter Coral  Patient is still requiring oxygen and easily out of breath but mentation in at baseline.   Patient does confirm that Coral Garrison is medical and legal POA and would be his healthcare agent in the event he is unable to make his own decisions.  Patient and family a little frustrated that heart failure team did not see them as they were told on Monday 12AUG24 and that the prognosis timeline has not been covered.   Goal remains focused on disease directed cares at this time.  Palliative will follow for ongoing goals of care discussions as situation evolves.  Serious illness conversation completed in the room with patient and daughter and son in law       * Acute on chronic heart failure (HCC)  Assessment & Plan  Wt Readings from Last 3 Encounters:   08/15/24 73 kg (160 lb 15 oz)   07/29/24 72 kg (158 lb 12.8 oz)   07/28/24 71.9 kg (158 lb 8.2 oz)     Lab Results   Component Value Date    BNP 1,091 (H) 08/06/2024    BNP 1,428 (H) 07/24/2024    LVEF 50 07/14/2024           Baseline O2 2L  On admission 06AUG24 pt presented w/ acute on chronic respiratory failure w/ hypoxia,  requireing nebs 4L NC oxygen Acute and chronic respiratory failure with hypoxia 2/2 CHF exacerbation a/e/b hypoxia, increased oxygen demands requiring CXR, Nebs, PO bumex and 4L NC oxygen.   SXS: 4 day hx of SOB, dyspnea on exertion, productive cough, weight gain, lower extremity edeam. Could not complete sentences at  rest, orthopnea  Recent admission started on 10mg Torsemide PO Daily     [id number] XR chest portable  Airspace disease and pleural effusion on the right.   53KRJ30 XR chest portable  Persistent large right pleural effusion with right base atelectasis. Pneumonia not excluded in the appropriate clinical setting.   64VJY44 XR chest portable  No significant change of pleural effusions, pulmonary opacities, or cardiomediastinal silhouette   45URC60 XR chest 2 views  Mild pulmonary edema, improved. Grossly stable right greater than left pleural effusions.  [id number] ECHO    Left Ventricle: Left ventricular cavity size is normal. Wall thickness is normal. The left ventricular ejection fraction is 55%. Systolic function is normal. Wall motion is normal.    Right Ventricle: Right ventricular cavity size is normal. Systolic function is normal. A pacer wire is present.    Aortic Valve: There is trace regurgitation.    Mitral Valve: There is moderate to severe regurgitation.    Tricuspid Valve: There is mild to moderate regurgitation. The right ventricular systolic pressure is severely elevated. The estimated right ventricular systolic pressure is 66.00 mmHg.    Pericardium: There is a left pleural effusion.    Prior TTE study available for comparison. Prior study date: 2023. Changes noted when compared to prior study. Changes include: Mitral regurgitation has worsened.      Plan:  Cardiology on board  Bumex 2mg PO BID on hold d/t renal function  Toprolo 75mg PO BID  Cardiac diet  Symptomatic thoracentesis [id number] with 1300ml drained.  No plurex cath placement pre pulm can be done outpatient.   Currently patient is volume overloaded with intravascular depletion which has been difficult to balance               24 Hour History  Chart reviewed before visit.  Pt was very lethargic last 24 hours with periods of delirium according to the daughter.  Pt continues to need 2L oxygen down from 3L [id number].  Pt diuresis  currently on hold.        Current pain location(s): Patient denied any pain  Pain Scale:   NA        Review of Systems   Constitutional:  Positive for fatigue.   Respiratory:  Positive for cough.    Psychiatric/Behavioral:  Positive for confusion.          Medications    Current Facility-Administered Medications:     acetaminophen (TYLENOL) tablet 650 mg, 650 mg, Oral, Q8H PRN, Steve Tripathi MD, 650 mg at 08/10/24 1242    albuterol (PROVENTIL HFA,VENTOLIN HFA) inhaler 2 puff, 2 puff, Inhalation, Q6H PRN, Steve Tripathi MD, 2 puff at 08/06/24 1914    apixaban (ELIQUIS) tablet 5 mg, 5 mg, Oral, BID, Steve Tripathi MD, 5 mg at 08/15/24 0823    ascorbic acid (VITAMIN C) tablet 500 mg, 500 mg, Oral, Daily, Steve Tripathi MD, 500 mg at 08/15/24 0823    benzonatate (TESSALON PERLES) capsule 100 mg, 100 mg, Oral, TID PRN, Steve Tripathi MD, 100 mg at 08/12/24 1728    Cholecalciferol (VITAMIN D3) tablet 1,000 Units, 1,000 Units, Oral, Daily, Steve Tripathi MD, 1,000 Units at 08/15/24 0823    dextromethorphan-guaiFENesin (ROBITUSSIN DM) oral syrup 10 mL, 10 mL, Oral, Q4H PRN, Odin Castro MD, 10 mL at 08/13/24 2158    diltiazem (CARDIZEM CD) 24 hr capsule 120 mg, 120 mg, Oral, Daily, Steve Tripathi MD, 120 mg at 08/15/24 0823    ezetimibe (ZETIA) tablet 10 mg, 10 mg, Oral, HS, Steve Tripathi MD, 10 mg at 08/14/24 2103    famotidine (PEPCID) tablet 20 mg, 20 mg, Oral, BID, Steve Tripathi MD, 20 mg at 08/15/24 0823    flecainide (TAMBOCOR) tablet 150 mg, 150 mg, Oral, BID, Steve Tripathi MD, 150 mg at 08/15/24 0823    gabapentin (NEURONTIN) capsule 300 mg, 300 mg, Oral, HS, Steve Tripathi MD, 300 mg at 08/14/24 2103    insulin glargine (LANTUS) subcutaneous injection 10 Units 0.1 mL, 10 Units, Subcutaneous, HS, Steve Tripathi MD, 10 Units at 08/14/24 2106    insulin lispro (HumALOG/ADMELOG) 100 units/mL subcutaneous injection 1-5 Units, 1-5 Units, Subcutaneous, TID AC, 1 Units at 08/13/24 1222 **AND** Fingerstick Glucose  (POCT), , , TID AC, Steve Tripathi MD    levalbuterol (XOPENEX) inhalation solution 1.25 mg, 1.25 mg, Nebulization, TID, Fauzia Pennington MD, 1.25 mg at 08/15/24 0753    loratadine (CLARITIN) tablet 10 mg, 10 mg, Oral, Daily, Steve Tripathi MD, 10 mg at 08/15/24 0823    metoprolol succinate (TOPROL-XL) 24 hr tablet 75 mg, 75 mg, Oral, Q12H, Steve Tripathi MD, 75 mg at 08/15/24 0553    rOPINIRole (REQUIP) tablet 2 mg, 2 mg, Oral, HS, Steve Tripathi MD, 2 mg at 08/14/24 2103    zolpidem (AMBIEN) tablet 10 mg, 10 mg, Oral, HS, Steve Tripathi MD, 10 mg at 08/14/24 2103    Objective  /54 (BP Location: Right arm)   Pulse 60   Temp 99 °F (37.2 °C) (Oral)   Resp 16   Wt 73 kg (160 lb 15 oz)   LMP 02/01/1990 (Within Weeks)   SpO2 97%   BMI 29.44 kg/m²   Physical Exam  Constitutional:       General: She is not in acute distress.     Appearance: Normal appearance. She is ill-appearing.   HENT:      Head: Normocephalic and atraumatic.      Mouth/Throat:      Mouth: Mucous membranes are moist.   Eyes:      General: No scleral icterus.        Right eye: No discharge.         Left eye: No discharge.   Cardiovascular:      Rate and Rhythm: Normal rate.      Pulses: Normal pulses.   Pulmonary:      Effort: Respiratory distress present.      Breath sounds: Rales (Right lower base) present. No wheezing.      Comments: Decreased breath sounds on the right middle lower lobes  Abdominal:      General: There is no distension.      Palpations: Abdomen is soft.      Tenderness: There is no abdominal tenderness. There is no guarding.   Musculoskeletal:      Right lower leg: No edema.      Left lower leg: No edema.   Skin:     General: Skin is warm.      Capillary Refill: Capillary refill takes less than 2 seconds.   Neurological:      Mental Status: She is alert and oriented to person, place, and time.   Psychiatric:         Attention and Perception: She is inattentive.         Mood and Affect: Mood is depressed.          "Behavior: Behavior is withdrawn. Behavior is cooperative.         Thought Content: Thought content is not paranoid or delusional. Thought content does not include homicidal or suicidal ideation. Thought content does not include homicidal or suicidal plan.      Comments: Anhedonia            Lab Results: CBC:   Lab Results   Component Value Date    WBC 6.17 08/15/2024    HGB 10.5 (L) 08/15/2024    HCT 33.7 (L) 08/15/2024    MCV 91 08/15/2024     08/15/2024    RBC 3.70 (L) 08/15/2024    MCH 28.4 08/15/2024    MCHC 31.2 (L) 08/15/2024    RDW 14.6 08/15/2024    MPV 10.3 08/15/2024    NRBC 0 08/15/2024   , BMP:  Lab Results   Component Value Date    SODIUM 133 (L) 08/15/2024    K 4.3 08/15/2024    CL 98 08/15/2024    CO2 30 08/15/2024    BUN 41 (H) 08/15/2024    CREATININE 1.39 (H) 08/15/2024    GLUC 92 08/15/2024    CALCIUM 8.9 08/15/2024    AGAP 5 08/15/2024    EGFR 35 08/15/2024     EKG, Pathology, and Other Studies: I have personally reviewed pertinent reports.    Counseling / Coordination of Care  Total floor / unit time spent today 30 minutes. Greater than 50% of total time was spent with the patient and / or family counseling and / or coordinating of care. A description of the counseling / coordination of care: Chart reviewed,  provided medical updates, determined goals of care, discussed palliative care and symptom management, discussed code status, discussed comfort care and hospice, provided anticipatory guidance, determined competency and POA/HCA, determined social/family support, provided psychosocial support.       Sandoval Barger MD [facility] PGY III  Palliative & Supportive Care    Portions of this document may have been created using dictation software and as such some \"sound alike\" terms may have been generated by the system. Do not hesitate to contact me with any questions or clarifications.     "

## 2024-08-15 NOTE — ASSESSMENT & PLAN NOTE
Recurrent right-sided pleural effusion:     S/P Thoracentesis [id number] w/ 1300ml removed No plurex placed can be considered outpatient

## 2024-08-15 NOTE — ASSESSMENT & PLAN NOTE
Recent Labs     08/13/24  0449 08/14/24  0628 08/15/24  0655   CREATININE 1.68* 1.61* 1.39*   EGFR 28 29 35     Estimated Creatinine Clearance: 30.2 mL/min (A) (by C-G formula based on SCr of 1.39 mg/dL (H)).    Elevated creatinine, continue to monitor    Plan:  PO Bumex 2mg daily, held in the setting of rising creatinine, will reinitiate today once cardiology gives recs.  Monitor BMP, gentle NS at 50cc/hr for 10 hours per cardiology  Avoid hypoperfusion of the kidneys, minimize nephrotoxins  Currently at baseline, continue to monitor with daily labs

## 2024-08-15 NOTE — ASSESSMENT & PLAN NOTE
Social Support:  Supportive listening provided  Normalized experience of patient/family  Provided anxiety containment     Follow-up  We appreciate the opportunity to participate in this patient's care.   We will continue to follow. PSC to follow up.  Please do not hesitate to contact our on-call provider through our clinic answering service at [phone] should there be an acute change or other symptom control concerns.             Decisional apparatus:  Patient with capacity to make medical decisions on my exam today. If such capacity is lost, patient's substitute decision maker would default Coral Garrison patients daughter.           Advance Directive / Living Will / POLST:  None on file - Coral Garrison is legal and medical POA and this was reiterated by patient bedside.      I have reviewed the patient's controlled substance dispensing history in the Prescription Drug Monitoring Program in compliance with the [facility] regulations before prescribing any controlled substances.

## 2024-08-16 LAB
ANION GAP SERPL CALCULATED.3IONS-SCNC: 6 MMOL/L (ref 4–13)
BACTERIA SPEC BFLD CULT: NO GROWTH
BASOPHILS # BLD AUTO: 0.05 THOUSANDS/ÂΜL (ref 0–0.1)
BASOPHILS NFR BLD AUTO: 1 % (ref 0–1)
BUN SERPL-MCNC: 36 MG/DL (ref 5–25)
CALCIUM SERPL-MCNC: 8.7 MG/DL (ref 8.4–10.2)
CHLORIDE SERPL-SCNC: 99 MMOL/L (ref 96–108)
CO2 SERPL-SCNC: 28 MMOL/L (ref 21–32)
CREAT SERPL-MCNC: 1.16 MG/DL (ref 0.6–1.3)
EOSINOPHIL # BLD AUTO: 0.31 THOUSAND/ÂΜL (ref 0–0.61)
EOSINOPHIL NFR BLD AUTO: 5 % (ref 0–6)
ERYTHROCYTE [DISTWIDTH] IN BLOOD BY AUTOMATED COUNT: 14.6 % (ref 11.6–15.1)
GFR SERPL CREATININE-BSD FRML MDRD: 44 ML/MIN/1.73SQ M
GLUCOSE SERPL-MCNC: 143 MG/DL (ref 65–140)
GLUCOSE SERPL-MCNC: 187 MG/DL (ref 65–140)
GLUCOSE SERPL-MCNC: 204 MG/DL (ref 65–140)
GLUCOSE SERPL-MCNC: 82 MG/DL (ref 65–140)
GLUCOSE SERPL-MCNC: 94 MG/DL (ref 65–140)
GRAM STN SPEC: NORMAL
GRAM STN SPEC: NORMAL
HCT VFR BLD AUTO: 33.7 % (ref 34.8–46.1)
HGB BLD-MCNC: 10.7 G/DL (ref 11.5–15.4)
IMM GRANULOCYTES # BLD AUTO: 0.02 THOUSAND/UL (ref 0–0.2)
IMM GRANULOCYTES NFR BLD AUTO: 0 % (ref 0–2)
LYMPHOCYTES # BLD AUTO: 1.51 THOUSANDS/ÂΜL (ref 0.6–4.47)
LYMPHOCYTES NFR BLD AUTO: 26 % (ref 14–44)
MCH RBC QN AUTO: 28.5 PG (ref 26.8–34.3)
MCHC RBC AUTO-ENTMCNC: 31.8 G/DL (ref 31.4–37.4)
MCV RBC AUTO: 90 FL (ref 82–98)
MONOCYTES # BLD AUTO: 0.86 THOUSAND/ÂΜL (ref 0.17–1.22)
MONOCYTES NFR BLD AUTO: 15 % (ref 4–12)
NEUTROPHILS # BLD AUTO: 2.96 THOUSANDS/ÂΜL (ref 1.85–7.62)
NEUTS SEG NFR BLD AUTO: 53 % (ref 43–75)
NRBC BLD AUTO-RTO: 0 /100 WBCS
PLATELET # BLD AUTO: 206 THOUSANDS/UL (ref 149–390)
PMV BLD AUTO: 10.8 FL (ref 8.9–12.7)
POTASSIUM SERPL-SCNC: 4.3 MMOL/L (ref 3.5–5.3)
RBC # BLD AUTO: 3.75 MILLION/UL (ref 3.81–5.12)
SODIUM SERPL-SCNC: 133 MMOL/L (ref 135–147)
WBC # BLD AUTO: 5.71 THOUSAND/UL (ref 4.31–10.16)

## 2024-08-16 PROCEDURE — 99232 SBSQ HOSP IP/OBS MODERATE 35: CPT | Performed by: INTERNAL MEDICINE

## 2024-08-16 PROCEDURE — 85025 COMPLETE CBC W/AUTO DIFF WBC: CPT

## 2024-08-16 PROCEDURE — 97166 OT EVAL MOD COMPLEX 45 MIN: CPT

## 2024-08-16 PROCEDURE — 97163 PT EVAL HIGH COMPLEX 45 MIN: CPT

## 2024-08-16 PROCEDURE — 82948 REAGENT STRIP/BLOOD GLUCOSE: CPT

## 2024-08-16 PROCEDURE — 94760 N-INVAS EAR/PLS OXIMETRY 1: CPT

## 2024-08-16 PROCEDURE — 97129 THER IVNTJ 1ST 15 MIN: CPT

## 2024-08-16 PROCEDURE — 94640 AIRWAY INHALATION TREATMENT: CPT

## 2024-08-16 PROCEDURE — 80048 BASIC METABOLIC PNL TOTAL CA: CPT

## 2024-08-16 PROCEDURE — 97535 SELF CARE MNGMENT TRAINING: CPT

## 2024-08-16 PROCEDURE — 99232 SBSQ HOSP IP/OBS MODERATE 35: CPT | Performed by: FAMILY MEDICINE

## 2024-08-16 RX ORDER — BUMETANIDE 1 MG/1
2 TABLET ORAL DAILY
Status: DISCONTINUED | OUTPATIENT
Start: 2024-08-16 | End: 2024-08-20 | Stop reason: HOSPADM

## 2024-08-16 RX ADMIN — Medication 1000 UNITS: at 08:47

## 2024-08-16 RX ADMIN — GUAIFENESIN AND DEXTROMETHORPHAN 10 ML: 100; 10 SYRUP ORAL at 10:30

## 2024-08-16 RX ADMIN — INSULIN LISPRO 1 UNITS: 100 INJECTION, SOLUTION INTRAVENOUS; SUBCUTANEOUS at 12:24

## 2024-08-16 RX ADMIN — FLECAINIDE ACETATE 150 MG: 50 TABLET ORAL at 08:47

## 2024-08-16 RX ADMIN — DILTIAZEM HYDROCHLORIDE 120 MG: 120 CAPSULE, COATED, EXTENDED RELEASE ORAL at 08:47

## 2024-08-16 RX ADMIN — OXYCODONE HYDROCHLORIDE AND ACETAMINOPHEN 500 MG: 500 TABLET ORAL at 08:47

## 2024-08-16 RX ADMIN — EZETIMIBE 10 MG: 10 TABLET ORAL at 21:13

## 2024-08-16 RX ADMIN — LEVALBUTEROL HYDROCHLORIDE 1.25 MG: 1.25 SOLUTION RESPIRATORY (INHALATION) at 07:55

## 2024-08-16 RX ADMIN — LEVALBUTEROL HYDROCHLORIDE 1.25 MG: 1.25 SOLUTION RESPIRATORY (INHALATION) at 20:15

## 2024-08-16 RX ADMIN — FAMOTIDINE 20 MG: 20 TABLET, FILM COATED ORAL at 17:14

## 2024-08-16 RX ADMIN — BENZONATATE 100 MG: 100 CAPSULE ORAL at 21:42

## 2024-08-16 RX ADMIN — GUAIFENESIN AND DEXTROMETHORPHAN 10 ML: 100; 10 SYRUP ORAL at 21:42

## 2024-08-16 RX ADMIN — APIXABAN 5 MG: 5 TABLET, FILM COATED ORAL at 08:48

## 2024-08-16 RX ADMIN — LEVALBUTEROL HYDROCHLORIDE 1.25 MG: 1.25 SOLUTION RESPIRATORY (INHALATION) at 13:10

## 2024-08-16 RX ADMIN — FLECAINIDE ACETATE 150 MG: 50 TABLET ORAL at 17:14

## 2024-08-16 RX ADMIN — FAMOTIDINE 20 MG: 20 TABLET, FILM COATED ORAL at 08:47

## 2024-08-16 RX ADMIN — APIXABAN 5 MG: 5 TABLET, FILM COATED ORAL at 17:14

## 2024-08-16 RX ADMIN — ZOLPIDEM TARTRATE 10 MG: 5 TABLET, COATED ORAL at 21:13

## 2024-08-16 RX ADMIN — BUMETANIDE 2 MG: 1 TABLET ORAL at 08:47

## 2024-08-16 RX ADMIN — METOPROLOL SUCCINATE 75 MG: 50 TABLET, EXTENDED RELEASE ORAL at 17:14

## 2024-08-16 RX ADMIN — INSULIN GLARGINE 10 UNITS: 100 INJECTION, SOLUTION SUBCUTANEOUS at 21:13

## 2024-08-16 RX ADMIN — GABAPENTIN 300 MG: 300 CAPSULE ORAL at 21:13

## 2024-08-16 RX ADMIN — ROPINIROLE HYDROCHLORIDE 2 MG: 1 TABLET, FILM COATED ORAL at 21:13

## 2024-08-16 RX ADMIN — LORATADINE 10 MG: 10 TABLET ORAL at 08:47

## 2024-08-16 NOTE — PHYSICAL THERAPY NOTE
PHYSICAL THERAPY EVALUATION NOTE        Patient Name: Farnaz Martin  Today's Date: 24 0932   PT Last Visit   PT Visit Date 24   Note Type   Note type Evaluation   Pain Assessment   Pain Assessment Tool 0-10   Pain Score No Pain   Multiple Pain Sites No   Restrictions/Precautions   Weight Bearing Precautions Per Order No   Other Precautions Chair Alarm;Bed Alarm;Telemetry;Fall Risk  (not on O2 upon arrival to room)   Home Living   Type of Home House   Home Layout One level;Other (Comment);Performs ADLs on one level;Able to live on main level with bedroom/bathroom;Stairs to enter without rails;Ramped entrance  (2 CHIN w/o rails or can use ramp)   Bathroom Shower/Tub Walk-in shower   Bathroom Toilet Raised   Bathroom Equipment Grab bars around toilet;Grab bars in shower;Shower chair   Bathroom Accessibility Accessible   Home Equipment Walker   Additional Comments above information is true baseline set up however admitted from [facility] after recent d/c from [facility]   Prior Function   Level of [place] Independent with ADLs;Independent with IADLS;Needs assistance with ADLs   Lives With Alone  (dtr lives next door)   Receives Help From Family   IADLs Family/Friend/Other provides meals;Family/Friend/Other provides medication management   Falls in the last 6 months 1 to 4  (leading to recent admissions)   Comments true baseline per chart review no AD but RW if needed   General   Family/Caregiver Present No   Cognition   Arousal/Participation Cooperative   Attention Attends with cues to redirect   Orientation Level Oriented X4  (gross orientation to day of month)   Memory Decreased recall of precautions   Following Commands Follows one step commands with increased time or repetition   Comments Pt ID by wristband, name, .   Subjective   Subjective Pt agreeable to PT eval.   RLE Assessment   RLE  Assessment X  (MMT assessed seated in recliner)   Strength RLE   R Hip Flexion 3+/5  (reports TKA and a revision done last year)   R Knee Flexion 3+/5   R Knee Extension 4-/5   R Ankle Dorsiflexion 3+/5   R Ankle Plantar Flexion 3+/5   LLE Assessment   LLE Assessment X   Strength LLE   L Hip Flexion 4-/5   L Knee Flexion 3+/5   L Knee Extension 4-/5   L Ankle Dorsiflexion 3+/5   L Ankle Plantar Flexion 3+/5   Vision-Basic Assessment   Current Vision Wears glasses all the time   Coordination   Sensation X  (denies N/T b/l LE)   Bed Mobility   Additional Comments pt OOB in recliner pre/post   Transfers   Sit to Stand 5  Supervision   Additional items Assist x 1;Increased time required  (from recliner x1)   Stand to Sit 5  Supervision   Additional items Assist x 1;Increased time required  (to recliner x1)   Ambulation/Elevation   Gait pattern Antalgic;Narrow JOSEPH;Forward Flexion;Decreased heel strike;Decreased toe off;Step through pattern;Excessively slow   Gait Assistance 5  Supervision   Additional items Assist x 1   Assistive Device Rolling walker   Distance 220 ft x1  (recliner, within shankar and return with directional change to recliner)   Stair Management Technique   (deferred stair trial as pt has ramp to enter)   Ambulation/Elevation Additional Comments dec gait speed and lateral trunk sway observed with fatigue (R>L); vc for RW mgmt   Balance   Static Sitting Good   Dynamic Sitting Fair +  (able to don/doff sock without LOB or b/l UE to perform)   Static Standing Fair  (RW)   Dynamic Standing Fair -  (RW)   Ambulatory Fair  (RW)   Endurance Deficit   Endurance Deficit Yes   Activity Tolerance   Activity Tolerance Patient limited by fatigue   Medical Staff Made Aware Care coordinated with OT Paola, PT [place]. Spoke to CM   Nurse Made Aware Spoke with nurse pre/post   Assessment   Prognosis Fair   Problem List Decreased strength;Impaired balance;Decreased mobility;Decreased safety awareness;Obesity;Decreased  endurance;Decreased coordination   Assessment Pt is a female yo 79 admitted to [facility] on 8/6/2024 with primary dx of acute on chronic heart failure. Pt with recent admission to [facility] and currently admitted STR at [facility]. Pt PMH significant for HTN, osteopenia, peripheral neuropathy, meningioma, ambulatory dysfunction, revision of L TKA and falls. PT consulted with orders for PT eval and treat. Prior to admission pt was independent with ADLs and ambulation using RW PRN and A for IADL. Upon evaluation, pt presents with decreased balance, deficits in strength and decreased activity tolerance. Pt requires supervision for all transfers and ambulation with RW. Pt is at an increased risk for falls due to history of falls, increased level of A required and decreased static and dynamic standing balance. Pt is currently not functioning at baseline and would benefit from skilled PT services in order to address aforementioned impairments, return to PLOF, increase functional mobility, decrease caregiver burden and improve QOL. Given discussed impairments, patient PMH, medical management, pt current medical status is unstable/unpredictable. Pt recommendation is level III at discharge.   Goals   Patient Goals to get around better   STG Expiration Date 08/30/24   Short Term Goal #1 1. Pt will perform bed mobility with independently in order to return to PLOF.  2. Pt will perform all transfers with LRAD with mod I in order to improve functional mobility.  3. Pt will improve all aspects of balance by one grade in order to maximize safety when completing household tasks.   4. Pt will increase LE strength to 5/5 in order to independently perform ADLs.   5. Pt will ambulate for at least 500 ft using LRAD with mod I in order to increase endurance for household / community ambulation.   6. Pt will ascend/descend at least 2 stairs with HR in order to simulate home set up if needed to use stairs and community elevations.   Plan    Treatment/Interventions Functional transfer training;LE strengthening/ROM;Elevations;Therapeutic exercise;Endurance training;Patient/family training;Equipment eval/education;Bed mobility;Gait training;Spoke to nursing;Spoke to case management;OT;Family   PT Frequency 2-3x/wk   Discharge Recommendation   Rehab Resource Intensity Level, PT III (Minimum Resource Intensity)   Equipment Recommended Walker   AM-PAC Basic Mobility Inpatient   Turning in Flat Bed Without Bedrails 4   Lying on Back to Sitting on Edge of Flat Bed Without Bedrails 3   Moving Bed to Chair 3   Standing Up From Chair Using Arms 3   Walk in Room 3   Climb 3-5 Stairs With Railing 3   Basic Mobility Inpatient Raw Score 19   Basic Mobility Standardized Score 42.48   [facility] Highest Level Of Mobility   -HLM Goal 6: Walk 10 steps or more   -HLM Achieved 7: Walk 25 feet or more   End of Consult   Patient Position at End of Consult Bedside chair;Bed/Chair alarm activated;All needs within reach     The patient's AM-PAC Basic Mobility Inpatient Short Form Raw Score is 19. A Raw score of greater than or equal to 16 suggests the patient may benefit from discharge to home. Please also refer to the recommendation of the Physical Therapist for safe discharge planning.    Jessa Forbes, SPT

## 2024-08-16 NOTE — ASSESSMENT & PLAN NOTE
Thoracocentesis performed, 1300 mL clear yellow fluid drained from right pleural space.   Plan for IR procedure on 8/19 for pleural catheter placement prior to discharge

## 2024-08-16 NOTE — ASSESSMENT & PLAN NOTE
Pt endorsing orthopnea, SOB  Productive in nature without production of sputum  Uses Spiriva inhaler and Robitussin at home for sx's  R/p chest x-ray shows increased congestion, persistent pleural effusion that is worsening on chest x-rays  Cough component likely CHF exacerbation  Pulmonology was consulted, recommended no procedural interventions at this time, but recommended   -Continue XOPENEX inhalation solution 1.25 mg   -Continue home inhalers and nebulizer treatment on discharge.   -Possible home oxygen evaluation   - s/p thoracocentesis 8/13, fatigue and lethargy improved.      Plan:  Atrovent neb four times daily  XOPENEX inhalation solution 1.25 mg   Continue home inhalers and nebulizer treatment on discharge.   Possible home oxygen evaluation   Thoracocentesis performed, 1300 mL clear yellow fluid drained from right pleural space.   Monitor oxygen status today, wean as tolerated  Plan for IR procedure on 8/19 for pleural catheter placement prior to discharge

## 2024-08-16 NOTE — PLAN OF CARE

## 2024-08-16 NOTE — PLAN OF CARE
Problem: OCCUPATIONAL THERAPY ADULT  Goal: Performs self-care activities at highest level of function for planned discharge setting.  See evaluation for individualized goals.  Description: Treatment Interventions: ADL retraining, Functional transfer training, UE strengthening/ROM, Endurance training, Cognitive reorientation, Patient/family training, Equipment evaluation/education, Compensatory technique education, Energy conservation, Activityengagement, Continued evaluation          See flowsheet documentation for full assessment, interventions and recommendations.   Note: Limitation: Decreased ADL status, Decreased Safe judgement during ADL, Decreased cognition, Decreased endurance, Decreased high-level ADLs, Decreased self-care trans  Prognosis: Good  Assessment: Pt is a 79 y.o. female admitted to [facility] on 8/6/2024 w/ Acute on chronic heart failure.  has a past medical history of Actinic keratosis, Arthritis, Atrial fibrillation with RVR, Basal cell carcinoma, Benign colon polyp, Bronchiectasis, CHF, Disease of thyroid gland, Effusion of knee joint right, Esophageal reflux, Fibromyalgia, GERD, Hyperlipidemia, Hypertension, Hyponatremia, Malignant neoplasm of thyroid gland, Meningioma, MGUS, Palpitations, Peroneal tendonitis, right, Right lumbar radiculopathy, Thyroid cancer, Thyroid nodule, and Trochanteric bursitis of left hip. Pt with active OT orders.Pt seen as a co-evaluation with PT due to the patient's co-morbidities, clinically unstable presentation/clinical complexity, and present impairments. At baseline, pt was at STR. At baseline, as per pt report, pta, resides alone in a 1STH, 2STE vs ramp. Pt was I w/  ADLS and IADLS, (+) drove. Upon evaluation, pt currently requires S with RW for transfers and mobility. Pt currently requires I eating, I grooming, S UB ADLs, S LB ADLs, and S toileting. Pt is limited at this time 2*: endurance, activity tolerance, functional mobility, balance, functional standing  tolerance, decreased I w/ ADLS/IADLS, strength, and cognitive impairments.The following Occupational Performance Areas to address include: bathing/shower, toilet hygiene, dressing, health maintenance, functional mobility, community mobility, and clothing management. Pt to benefit from immediate acute skilled OT to address above deficits, improve overall functional independence, maximize fnxl mobility and reduce caregiver burden. From OT standpoint, recommendation at time of d/c would be home with skilled therapy vs OPOT.  Pt was left after session with all current needs met. The patient's raw score on the -PAC Daily Activity Inpatient Short Form is 20. A raw score of greater than or equal to 19 suggests the patient may benefit from discharge to home. Please refer to the recommendation of the Occupational Therapist for safe discharge planning.     Rehab Resource Intensity Level, OT: III (Minimum Resource Intensity)

## 2024-08-16 NOTE — PROGRESS NOTES
[facility]  Progress Note  Name: Farnaz Martin I  MRN: [id number]  Unit/Bed#: S -01 I Date of Admission: 8/6/2024   Date of Service: 8/16/2024 I Hospital Day: 10    Assessment & Plan   * Acute on chronic heart failure (HCC)  Assessment & Plan    Lab Results   Component Value Date    LVEF 50 07/14/2024    BNP 1,091 (H) 08/06/2024    BNP 1,428 (H) 07/24/2024     Acute and chronic respiratory failure with hypoxia 2/2 CHF exacerbation a/e/b hypoxia, increased oxygen demands requiring CXR, Nebs, PO bumex and 4L NC oxygen.    Presents with increased shortness of breath, dyspnea on exertion, lower extremity edema, and cough with productive sputum  Patient is currently volume overloaded.  Initial hx: 3 to 4-day history of progressively worsening dyspnea, dry cough, worsening lower extremity edema.  She required increased oxygen when EMS arrived. difficulty talking normal sentences secondary to shortness of breath.  She is now unable to lie flat.  She was discharged on recent admission with 10 mg torsemide daily.  Clinical suspicion is under diuresis leading to volume overload.  Findings: ED provider: She is at baseline on 2 L O2 supplemental O2 and, because of hypoxia to the 80s, was bumped up to 4.  On arrival 97% on 4L (baseline chronic 2L NC)  Currently pt is on room air saturating appopriately for her, diuresed approximately 3.9L of fluid since admission, improving SOB   Was seen by heart failure team 8/12, no recommendations were given    Serial cxr shows stable pleural effusion   Most recent Echo 50% LVEF     Weight (last 2 days)       Date/Time Weight    08/16/24 0600 72.6 (160.05)    08/15/24 05:50:45 73 (160.94)    08/14/24 0600 72.6 (160.05)          I/O: - 3.9 L since admission    Plan:  Per cardiology: PO Bumex 2 mg daily  kdur 20 meq BID. , B-Blocker: Toprol  Cardiac diet, sodium restriction  CMP, magnesium a.m; Goal Mg > 2 and K > 4; Replete prn  Daily standing weights,  Measure I/O  Thoracocentesis performed, 1300 mL clear yellow fluid drained from right pleural space.   Discussed with cardiology and appropriate maintenance diuretic dose that is adequate for discharge.          Elevated serum creatinine  Assessment & Plan    Recent Labs     08/14/24  0628 08/15/24  0655 08/16/24  0542   CREATININE 1.61* 1.39* 1.16   EGFR 29 35 44       Estimated Creatinine Clearance: 36.1 mL/min (by C-G formula based on SCr of 1.16 mg/dL).    Elevated creatinine, continue to monitor    Plan:  PO Bumex 2mg daily  Monitor BMP, gentle NS at 50cc/hr for 10 hours per cardiology  Avoid hypoperfusion of the kidneys, minimize nephrotoxins  Currently at baseline, continue to monitor with daily labs    Cough  Assessment & Plan  Pt endorsing orthopnea, SOB  Productive in nature without production of sputum  Uses Spiriva inhaler and Robitussin at home for sx's  R/p chest x-ray shows increased congestion, persistent pleural effusion that is worsening on chest x-rays  Cough component likely CHF exacerbation  Pulmonology was consulted, recommended no procedural interventions at this time, but recommended   -Continue XOPENEX inhalation solution 1.25 mg   -Continue home inhalers and nebulizer treatment on discharge.   -Possible home oxygen evaluation   - s/p thoracocentesis 8/13, fatigue and lethargy improved.      Plan:  Atrovent neb four times daily  XOPENEX inhalation solution 1.25 mg   Continue home inhalers and nebulizer treatment on discharge.   Possible home oxygen evaluation   Thoracocentesis performed, 1300 mL clear yellow fluid drained from right pleural space.   Monitor oxygen status today, wean as tolerated  Plan for IR procedure on 8/19 for pleural catheter placement prior to discharge      Paroxysmal A-fib (HCC)  Assessment & Plan  Afib-  S/p ablation in 03/2021.   EKG- AV dual-paced rhythm, Vent. rate has decreased by 14 BPM since prior EKG  Low voltage QRS    Plan:  Eliquis 5 mg twice  daily  Continue flecainide 150 mg twice daily and metoprolol succinate 75 mg twice daily  Continue diltiazem 120 mg daily    Palliative care patient  Assessment & Plan  Pt was consulted by palliative care and daughter was present in room as POA, discussed hospice topic as her condition is a stepwise progression.  Continued discussion with goals of care going forward as pt's prognosis progresses    Recurrent pleural effusion on right  Assessment & Plan  Thoracocentesis performed, 1300 mL clear yellow fluid drained from right pleural space.   Plan for IR procedure on 8/19 for pleural catheter placement prior to discharge     Goals of care, counseling/discussion  Assessment & Plan  Discussion with home hospice, daughter will discuss with patient.  They are leaning toward home hospice at some point, but this admission is not likely              VTE Pharmacologic Prophylaxis: VTE Score: 5 High Risk (Score >/= 5) - Pharmacological DVT Prophylaxis Ordered: apixaban (Eliquis). Sequential Compression Devices Ordered.    Mobility:   Basic Mobility Inpatient Raw Score: 21  JH-HLM Goal: 6: Walk 10 steps or more  JH-HLM Achieved: 7: Walk 25 feet or more  JH-HLM Goal achieved. Continue to encourage appropriate mobility.    Patient Centered Rounds: I performed bedside rounds with nursing staff today.   Discussions with Specialists or Other Care Team Provider: Cardiology, plan of care, pulmonology, interventional radiology    Education and Discussions with Family / Patient: Updated  (daughter) via phone.    Total Time Spent on Date of Encounter in care of patient: 60 mins. This time was spent on one or more of the following: performing physical exam; counseling and coordination of care; obtaining or reviewing history; documenting in the medical record; reviewing/ordering tests, medications or procedures; communicating with other healthcare professionals and discussing with patient's family/caregivers.    Current  Length of Stay: 10 day(s)  Current Patient Status: Inpatient   Certification Statement: The patient will continue to require additional inpatient hospital stay due to pleural catheter placement  Discharge Plan: Anticipate discharge in 48-72 hrs to rehab facility.    Code Status: Level 3 - DNAR and DNI    Subjective:   Evaluated patient at bedside this morning, she appears to be resting company in bed and in no apparent distress.  She denies any shortness of breath or worsening cough.  Upon examination, she is not requiring oxygen and is saturating well on room air.  Discussed the plan of care going forward, all questions and concerns were answered at bedside.    Objective:     Vitals:   Temp (24hrs), Av.1 °F (36.7 °C), Min:97.9 °F (36.6 °C), Max:98.2 °F (36.8 °C)    Temp:  [97.9 °F (36.6 °C)-98.2 °F (36.8 °C)] 98.2 °F (36.8 °C)  HR:  [63-81] 81  Resp:  [18] 18  BP: (104-116)/(60-65) 108/61  SpO2:  [94 %-99 %] 94 %  Body mass index is 29.27 kg/m².     Input and Output Summary (last 24 hours):     Intake/Output Summary (Last 24 hours) at 2024 1315  Last data filed at 2024 1203  Gross per 24 hour   Intake 540 ml   Output 375 ml   Net 165 ml     Physical Exam  Vitals and nursing note reviewed.   Constitutional:       General: She is not in acute distress.     Appearance: She is well-developed.   HENT:      Head: Normocephalic and atraumatic.   Eyes:      Conjunctiva/sclera: Conjunctivae normal.   Cardiovascular:      Rate and Rhythm: Normal rate and regular rhythm.      Heart sounds: Murmur heard.   Pulmonary:      Effort: Pulmonary effort is normal. No respiratory distress.      Breath sounds: Normal breath sounds.      Comments: Right lower lobe diminished  Abdominal:      Palpations: Abdomen is soft.      Tenderness: There is no abdominal tenderness.   Musculoskeletal:         General: No swelling.      Cervical back: Neck supple.   Skin:     General: Skin is warm and dry.      Capillary Refill:  Capillary refill takes less than 2 seconds.   Neurological:      Mental Status: She is alert.   Psychiatric:         Mood and Affect: Mood normal.     Additional Data:     Labs:  Results from last 7 days   Lab Units 08/16/24  0542   WBC Thousand/uL 5.71   HEMOGLOBIN g/dL 10.7*   HEMATOCRIT % 33.7*   PLATELETS Thousands/uL 206   SEGS PCT % 53   LYMPHO PCT % 26   MONO PCT % 15*   EOS PCT % 5     Results from last 7 days   Lab Units 08/16/24  0542 08/15/24  0655 08/14/24  0628   SODIUM mmol/L 133*   < > 134*   POTASSIUM mmol/L 4.3   < > 4.6   CHLORIDE mmol/L 99   < > 98   CO2 mmol/L 28   < > 29   BUN mg/dL 36*   < > 47*   CREATININE mg/dL 1.16   < > 1.61*   ANION GAP mmol/L 6   < > 7   CALCIUM mg/dL 8.7   < > 9.3   ALBUMIN g/dL  --   --  3.8   TOTAL BILIRUBIN mg/dL  --   --  0.95   ALK PHOS U/L  --   --  70   ALT U/L  --   --  24   AST U/L  --   --  21   GLUCOSE RANDOM mg/dL 82   < > 104    < > = values in this interval not displayed.         Results from last 7 days   Lab Units 08/16/24  1153 08/16/24  0717 08/15/24  2100 08/15/24  1526 08/15/24  1147 08/15/24  0817 08/14/24  2105 08/14/24  1527 08/14/24  1139 08/14/24  0720 08/13/24  2118 08/13/24  1517   POC GLUCOSE mg/dl 204* 94 210* 201* 152* 136 170* 83 111 88 169* 130               Lines/Drains:  Invasive Devices       Peripheral Intravenous Line  Duration             Peripheral IV 08/13/24 Dorsal (posterior);Left Forearm 2 days                          Imaging: Reviewed radiology reports from this admission including: chest xray    Recent Cultures (last 7 days):   Results from last 7 days   Lab Units 08/13/24  1259   GRAM STAIN RESULT  1+ Polys  No bacteria seen   BODY FLUID CULTURE, STERILE  No growth       Last 24 Hours Medication List:   Current Facility-Administered Medications   Medication Dose Route Frequency Provider Last Rate    acetaminophen  650 mg Oral Q8H PRN Steve Tripathi MD      albuterol  2 puff Inhalation Q6H PRN Steve Tripathi MD       apixaban  5 mg Oral BID Steve Tripathi MD      ascorbic acid  500 mg Oral Daily Steve Tripathi MD      benzonatate  100 mg Oral TID PRN Steve Tripathi MD      bumetanide  2 mg Oral Daily Steve Tripathi MD      Cholecalciferol  1,000 Units Oral Daily Steve Tripathi MD      dextromethorphan-guaiFENesin  10 mL Oral Q4H PRN Odin Castro MD      diltiazem  120 mg Oral Daily Steve Tripathi MD      ezetimibe  10 mg Oral HS Steve Tripathi MD      famotidine  20 mg Oral BID Steve Tripathi MD      flecainide  150 mg Oral BID Steve Tripathi MD      gabapentin  300 mg Oral HS Steve Tripathi MD      insulin glargine  10 Units Subcutaneous HS Steve Tripathi MD      insulin lispro  1-5 Units Subcutaneous TID AC Steve Tripathi MD      levalbuterol  1.25 mg Nebulization TID Fauzia Pennington MD      loratadine  10 mg Oral Daily Steve Tripathi MD      metoprolol succinate  75 mg Oral Q12H Steve Tripathi MD      rOPINIRole  2 mg Oral HS Steve Tirpathi MD      zolpidem  10 mg Oral HS Steve Tripathi MD          Today, Patient Was Seen By: Steve Tripathi MD    **Please Note: This note may have been constructed using a voice recognition system.**

## 2024-08-16 NOTE — ASSESSMENT & PLAN NOTE
Recent Labs     08/14/24  0628 08/15/24  0655 08/16/24  0542   CREATININE 1.61* 1.39* 1.16   EGFR 29 35 44       Estimated Creatinine Clearance: 36.1 mL/min (by C-G formula based on SCr of 1.16 mg/dL).    Elevated creatinine, continue to monitor    Plan:  PO Bumex 2mg daily  Monitor BMP, gentle NS at 50cc/hr for 10 hours per cardiology  Avoid hypoperfusion of the kidneys, minimize nephrotoxins  Currently at baseline, continue to monitor with daily labs

## 2024-08-16 NOTE — ASSESSMENT & PLAN NOTE
Lab Results   Component Value Date    LVEF 50 07/14/2024    BNP 1,091 (H) 08/06/2024    BNP 1,428 (H) 07/24/2024     Acute and chronic respiratory failure with hypoxia 2/2 CHF exacerbation a/e/b hypoxia, increased oxygen demands requiring CXR, Nebs, PO bumex and 4L NC oxygen.    Presents with increased shortness of breath, dyspnea on exertion, lower extremity edema, and cough with productive sputum  Patient is currently volume overloaded.  Initial hx: 3 to 4-day history of progressively worsening dyspnea, dry cough, worsening lower extremity edema.  She required increased oxygen when EMS arrived. difficulty talking normal sentences secondary to shortness of breath.  She is now unable to lie flat.  She was discharged on recent admission with 10 mg torsemide daily.  Clinical suspicion is under diuresis leading to volume overload.  Findings: ED provider: She is at baseline on 2 L O2 supplemental O2 and, because of hypoxia to the 80s, was bumped up to 4.  On arrival 97% on 4L (baseline chronic 2L NC)  Currently pt is on room air saturating appopriately for her, diuresed approximately 3.9L of fluid since admission, improving SOB   Was seen by heart failure team 8/12, no recommendations were given    Serial cxr shows stable pleural effusion   Most recent Echo 50% LVEF     Weight (last 2 days)       Date/Time Weight    08/16/24 0600 72.6 (160.05)    08/15/24 05:50:45 73 (160.94)    08/14/24 0600 72.6 (160.05)          I/O: - 3.9 L since admission    Plan:  Per cardiology: PO Bumex 2 mg daily  kdur 20 meq BID. , B-Blocker: Toprol  Cardiac diet, sodium restriction  CMP, magnesium a.m; Goal Mg > 2 and K > 4; Replete prn  Daily standing weights, Measure I/O  Thoracocentesis performed, 1300 mL clear yellow fluid drained from right pleural space.   Discussed with cardiology and appropriate maintenance diuretic dose that is adequate for discharge.

## 2024-08-16 NOTE — QUICK NOTE
8/16/2024 9:42 AM -  Farnaz GERARDO Mario's chart and case were reviewed by Peter Solomon DO.  Mode of review included electronic chart check.    Per review, symptoms remain controlled on current regimen and no changes are made at this time.  Please continue the regimen in place, and review our last note for details.  For dispo plan, please review Case Management notes.     Records reviewed. Plan for tunneled pleural catheter placement on Monday, 8/19 with IR.  Hospice is following as well at this time.    Palliative care will return on Monday, 8/19/2024.    For urgent issues or any questions/concerns, please notify on-call provider via [employer] Secure Chat.  You may also call our answering service 24/7 at [phone].    Peter Solomon DO  Palliative and Supportive Care  Clinic/Answering Service: [phone]  You can find me on [employer] Chat!

## 2024-08-16 NOTE — OCCUPATIONAL THERAPY NOTE
Occupational Therapy Evaluation + Treatment  Patient Name: Farnaz Martin  Today's Date: 8/16/2024  Problem List  Principal Problem:    Acute on chronic heart failure (HCC)  Active Problems:    Goals of care, counseling/discussion    Elevated serum creatinine    Cough    Paroxysmal A-fib (HCC)    Recurrent pleural effusion on right    Palliative care patient    Past Medical History  Past Medical History:   Diagnosis Date    Actinic keratosis     last assessed - 06Jun2014    Arthritis     Atrial fibrillation with rapid ventricular response (HCC)     last assessed - 26Apr2016    Atrial fibrillation with rapid ventricular response (HCC) 04/19/2016    Basal cell carcinoma     Benign colon polyp     last assessed - [id number]    Bronchiectasis without complication (HCC)     CHF (congestive heart failure) (HCC) 06/2022    Chronic diastolic CHF (congestive heart failure) (HCC)     Disease of thyroid gland     Effusion of knee joint right     Resolved - 19Apr2016    Esophageal reflux     Fibromyalgia     last assessed - [id number]    Fibromyalgia, primary     GERD (gastroesophageal reflux disease)     Hyperlipidemia     Hypertension     Hyponatremia     Malignant neoplasm of thyroid gland (HCC)     Meningioma (HCC)     MGUS (monoclonal gammopathy of unknown significance)     Palpitations     last assessed - 30Apr2013    Peroneal tendonitis, right     last assessed - [id number]    Right lumbar radiculopathy 03/17/2016    Thyroid cancer (HCC)     Thyroid nodule     Trochanteric bursitis of left hip 03/09/2018     Past Surgical History  Past Surgical History:   Procedure Laterality Date    CARDIAC ELECTROPHYSIOLOGY STUDY AND ABLATION  08/2022    CATARACT EXTRACTION Bilateral     COLONOSCOPY  03/2018    COLONOSCOPY W/ POLYPECTOMY  2021    repeat in 5 years    EYE SURGERY      IR THORACENTESIS  7/27/2024    IR THORACENTESIS  8/13/2024    OOPHORECTOMY      WY NEUROPLASTY &/TRANSPOS MEDIAN NRV CARPAL TUNNE Right 11/14/2019     "Procedure: RELEASE CARPAL TUNNEL;  Surgeon: Onesimo Tate MD;  Location: [facility];  Service: Orthopedics    [place] TOTAL THYROID LOBECTOMY UNI W/WO ISTHMUSECTOMY Left 12/16/2020    Procedure: Left THYROID LOBECTOMY;  Surgeon: Jhony Bhatt MD;  Location: [facility];  Service: ENT    RECTAL POLYPECTOMY      REPLACEMENT TOTAL KNEE Left     last assessed - [id number]    TONSILLECTOMY      TOTAL ABDOMINAL HYSTERECTOMY      TUBAL LIGATION      US GUIDED THYROID BIOPSY  10/14/2020           08/16/24 1131   OT Last Visit   OT Visit Date 08/16/24   Note Type   Note type Evaluation  (+ treatment)   Pain Assessment   Pain Assessment Tool 0-10   Pain Score No Pain   Restrictions/Precautions   Weight Bearing Precautions Per Order No   Other Precautions Cognitive;Chair Alarm;Bed Alarm;Fall Risk;Telemetry   Home Living   Type of Home House   Home Layout One level  (2STE vs ramp)   Bathroom Shower/Tub Walk-in shower   Bathroom Toilet Raised   Bathroom Equipment Grab bars in shower;Shower chair;Commode;Grab bars around toilet   Home Equipment Walker  (reports only started using recently)   Additional Comments PTA, pt was at [facility]. Above is home set up   Prior Function   Level of [facility] Independent with ADLs;Independent with IADLS   Lives With Alone   Receives Help From Family   IADLs Independent with driving;Independent with meal prep;Family/Friend/Other provides medication management  (dtr organizes medications)   Falls in the last 6 months 1 to 4  (4-5 per pt report)   Vocational Retired   Comments Per chart, pt was at STR. Above is baseline performance   Lifestyle   Autonomy At baseline, pt reports being I with ADLs, IADLs (dtr does organize pt's medications), recently started using RW, (+)  but seldom   Reciprocal Relationships Supportive dtr and REKHA who live next door   Service to Others Retired -    Intrinsic Gratification Likes gardening   Subjective   Subjective \"It feels good to " "work\"   ADL   Where Assessed Chair   Eating Assistance 7  Independent   Grooming Assistance 7  Independent   UB Bathing Assistance 5  Supervision/Setup   LB Bathing Assistance 5  Supervision/Setup   UB Dressing Assistance 5  Supervision/Setup   LB Dressing Assistance 5  Supervision/Setup   Toileting Assistance  5  Supervision/Setup   Bed Mobility   Supine to Sit Unable to assess   Sit to Supine Unable to assess   Additional Comments Pt seated OOB in chair upon arrival   Transfers   Sit to Stand 5  Supervision   Additional items Armrests   Stand to Sit 5  Supervision   Additional items Armrests   Additional Comments transfers with RW   Functional Mobility   Functional Mobility 5  Supervision   Additional items Rolling walker   Balance   Static Sitting Good   Dynamic Sitting Fair +   Static Standing Fair   Dynamic Standing Fair -   Ambulatory Fair   Activity Tolerance   Activity Tolerance Patient tolerated treatment well   Medical Staff Made Aware PT Aspen, CATALINA Tonja   Nurse Made Aware RN clearance for session   RUE Assessment   RUE Assessment WFL   LUE Assessment   LUE Assessment WFL   Vision-Basic Assessment   Current Vision Wears glasses all the time   Cognition   Overall Cognitive Status Impaired   Arousal/Participation Responsive;Cooperative   Attention Attends with cues to redirect   Orientation Level Oriented X4   Memory Decreased short term memory   Following Commands Follows one step commands with increased time or repetition   Comments Pt pleasant and cooperative during session. Decreased attention. Recalls 1/3 words without cues, additional cue recalled with multiple choice options, unable to recall 3rd word. Would benefit from cognitive assessment   Assessment   Limitation Decreased ADL status;Decreased Safe judgement during ADL;Decreased cognition;Decreased endurance;Decreased high-level ADLs;Decreased self-care trans   Prognosis Good   Assessment Pt is a 79 y.o. female admitted to [facility] on 8/6/2024 w/ Acute " on chronic heart failure   + recurrent pleural effusion. Now s/p thoracentesis.  has a past medical history of Actinic keratosis, Arthritis, Atrial fibrillation with RVR, Basal cell carcinoma, Benign colon polyp, Bronchiectasis, CHF, Disease of thyroid gland, Effusion of knee joint right, Esophageal reflux, Fibromyalgia, GERD, Hyperlipidemia, Hypertension, Hyponatremia, Malignant neoplasm of thyroid gland, Meningioma, MGUS, Palpitations, Peroneal tendonitis, right, Right lumbar radiculopathy, Thyroid cancer, Thyroid nodule, and Trochanteric bursitis of left hip. Pt with active OT orders.Pt seen as a co-evaluation with PT due to the patient's co-morbidities, clinically unstable presentation/clinical complexity, and present impairments. At baseline, pt was at STR. At baseline, as per pt report, pta, resides alone in a 1STH, 2STE vs ramp. Pt was I w/  ADLS and IADLS, (+) drove. Upon evaluation, pt currently requires S with RW for transfers and mobility. Pt currently requires I eating, I grooming, S UB ADLs, S LB ADLs, and S toileting. Pt is limited at this time 2*: endurance, activity tolerance, functional mobility, balance, functional standing tolerance, decreased I w/ ADLS/IADLS, strength, and cognitive impairments.The following Occupational Performance Areas to address include: bathing/shower, toilet hygiene, dressing, health maintenance, functional mobility, community mobility, and clothing management. Pt to benefit from immediate acute skilled OT to address above deficits, improve overall functional independence, maximize fnxl mobility and reduce caregiver burden. From OT standpoint, recommendation at time of d/c would be home with skilled therapy vs OPOT.  Pt was left after session with all current needs met. The patient's raw score on the AM-PAC Daily Activity Inpatient Short Form is 20. A raw score of greater than or equal to 19 suggests the patient may benefit from discharge to home. Please refer to the  recommendation of the Occupational Therapist for safe discharge planning.   Goals   LT Time Frame 10-14   Plan   Treatment Interventions ADL retraining;Functional transfer training;UE strengthening/ROM;Endurance training;Cognitive reorientation;Patient/family training;Equipment evaluation/education;Compensatory technique education;Energy conservation;Activityengagement;Continued evaluation   Goal Expiration Date 08/30/24   OT Frequency 1-2x/wk   Discharge Recommendation   Rehab Resource Intensity Level, OT III (Minimum Resource Intensity)   AM-PAC Daily Activity Inpatient   Lower Body Dressing 3   Bathing 3   Toileting 3   Upper Body Dressing 3   Grooming 4   Eating 4   Daily Activity Raw Score 20   Daily Activity Standardized Score (Calc for Raw Score >=11) 42.03   AM-PAC Applied Cognition Inpatient   Following a Speech/Presentation 3   Understanding Ordinary Conversation 3   Taking Medications 3   Remembering Where Things Are Placed or Put Away 3   Remembering List of 4-5 Errands 2   Taking Care of Complicated Tasks 2   Applied Cognition Raw Score 16   Applied Cognition Standardized Score 35.03   Additional Treatment Session   Start Time 1106   End Time 1131   Treatment Assessment Pt seen for additional OT tx session to administer formal cognitive assessment. Pt performed Chris Cognitive Assessment and scored 24/30 indicating a mild cognitive impairment (26+ considered normal). Please see further details regarding score below. Pt also performed the following: S STS, fnxl mobility RW, S toileting and toilet transfer. From OT standpoint, recommendation would be home with skilled therapy vs OPOT for cognition. Patient to benefit from continued Occupational Therapy treatment while in the hospital to address deficits as defined above and maximize level of functional independence with ADLs and functional mobility. Pt was left after session with all current needs met. The patient's raw score on the AM-PAC Daily  Activity Inpatient Short Form is 20. A raw score of greater than or equal to 19 suggests the patient may benefit from discharge to home. Please refer to the recommendation of the Occupational Therapist for safe discharge planning.   Additional Treatment Day 1     GOALS    - Pt will complete UB dressing/self care/bathing w/ mod I using adaptive device and DME as needed    - Pt will complete LB dressing/self care/bathing w/ mod I using adaptive device and DME as needed    - Pt will complete toileting w/ mod I w/ G hygiene/thoroughness using DME as needed    - Pt will improve functional transfers to Mod I on/off all surfaces using DME as needed w/ G balance/safety     - Pt will improve functional mobility during ADL/IADL/leisure tasks to Mod I using DME as needed w/ G balance/safety     - Pt will demonstrate G carryover of pt/caregiver education and training as appropriate.    - Pt will demonstrate 100% carryover of energy conservation techniques t/o functional I/ADL/leisure tasks w/o cues s/p skilled education    - Pt will engage in ongoing cognitive assessment w/ G participation to assist w/ safe d/c planning/recommendations    - Pt will increase static and dynamic standing/sitting balance to F+  using AD/DME as needed to increase safety and I during fnxl transfers and ADLs    - Pt will maintain upright sitting position for at least 20 min during dynamic fnxl activity with F+  balance and endurance to improve ADL performance and independence, as well as reduce risk of falls     - Pt will participate in simulated IADL management task with DME as needed to increase independence w/ G safety and endurance    MOCA  PT SEEN FOR JOSE COGNITIVE ASSESSMENT.  SCORED 24/30 INDICATING A MILD COGNITIVE IMPAIRMENT FOR AGE/EDUCATION.  SCORES ARE AS FOLLOWS:    VISUOSPATIAL/EXECUTIVE FUNCTION: 3/5. Pt was able to complete trail. Pt was unable to copy cube. Pt was able to draw a clock, accurately place the numbers, but unable to  properly place the hands at ten past eleven.    NAMING: 3/3. Pt able to name 3/3 animals.    ATTENTION: 5/6. Pt was able to repeat sequence of numbers forwards and backwards. Pt able to attend to sequence of letters. Pt was able to correctly subtract 7 from 100 2/5 times.     LANGUAGE: 2/3. Pt was able to repeat sentences back to therapist. Pt was able to produce 9 words starting with the letter F in 1 minute.     ABSTRACTION: 2/2. Pt was able to identify the similarity of two items x2 trials.    DELAYED RECALL: 3/5. Pt recalled 3/5 words without cues. Pt recalled 1/5 words with category cue. Pt recalled 1/5 words with multiple choice options.     ORIENTATION: 5/6. Pt is oriented to month, year, day, place and city but not exact date.    Pt received additional point for having no more than 12 years of education.       VAUGHN Espinal, OTR/L

## 2024-08-16 NOTE — PROGRESS NOTES
Progress Note - Cardiology   Farnaz Martin 79 y.o. female MRN: [id number]  Unit/Bed#: S -01 Encounter: [id number]    Assessment:  #. Acute on chronic HFpEF  #. Recurrent R pleural effusion  #. PAF  #. SSS s/p PPM  #. KIMBERLEY    Patient seen and examined this AM. Volume up by exam. Renal function improving. Admits to good appetite and po intake. Discussed plans to restart diuretic today.    Plan:  -Resume bumex 2 mg daily  -Continue remainder of cardiac meds  -Reviewed IR plans for R tunneled pleural catheter on 8/19  -Plan is for short-term rehab and hospice    Subjective/Objective     Subjective: Patient seen & examined. No acute events overnight.    Objective:   Vitals: /61 (BP Location: Right arm)   Pulse 81   Temp 98.2 °F (36.8 °C) (Oral)   Resp 18   Wt 72.6 kg (160 lb 0.9 oz)   LMP 02/01/1990 (Within Weeks)   SpO2 94%   BMI 29.27 kg/m²   Vitals:    08/15/24 0550 08/16/24 0600   Weight: 73 kg (160 lb 15 oz) 72.6 kg (160 lb 0.9 oz)     Orthostatic Blood Pressures      Flowsheet Row Most Recent Value   Blood Pressure 108/61 filed at 08/16/2024 0712   Patient Position - Orthostatic VS Sitting filed at 08/16/2024 0712              Intake/Output Summary (Last 24 hours) at 8/16/2024 1428  Last data filed at 8/16/2024 1334  Gross per 24 hour   Intake 780 ml   Output 725 ml   Net 55 ml       Invasive Devices       Peripheral Intravenous Line  Duration             Peripheral IV 08/13/24 Dorsal (posterior);Left Forearm 2 days                  Physical Exam: General appearance: alert and oriented, in no acute distress  Lungs: R base dull, no wheezing, no rhonchi   Heart: RRR, no murmur, no gallop  Extremities: extremities normal, warm and well-perfused; no cyanosis, clubbing, or edema  Skin: warm & dry    Lab Results: I have personally reviewed pertinent lab results.    Imaging: I have personally reviewed pertinent reports.    EKG: Personally reviewed

## 2024-08-16 NOTE — PLAN OF CARE
Problem: PHYSICAL THERAPY ADULT  Goal: Performs mobility at highest level of function for planned discharge setting.  See evaluation for individualized goals.  Description: Treatment/Interventions: Functional transfer training, LE strengthening/ROM, Elevations, Therapeutic exercise, Endurance training, Patient/family training, Equipment eval/education, Bed mobility, Gait training, Spoke to nursing, Spoke to case management, OT, Family  Equipment Recommended: Walker       See flowsheet documentation for full assessment, interventions and recommendations.  8/16/2024 1432 by Aspen Lobato PT  Note: Prognosis: Fair  Problem List: Decreased strength, Impaired balance, Decreased mobility, Decreased safety awareness, Obesity, Decreased endurance, Decreased coordination  Assessment: Pt is a female yo 79 admitted to [facility] on 8/6/2024 with primary dx of acute on chronic heart failure. Pt with recent admission to [facility] and currently admitted STR at [facility]. Pt PMH significant for HTN, osteopenia, peripheral neuropathy, meningioma, ambulatory dysfunction, revision of L TKA and falls. PT consulted with orders for PT eval and treat. Prior to admission pt was independent with ADLs and ambulation using RW PRN and A for IADL. Upon evaluation, pt presents with decreased balance, deficits in strength and decreased activity tolerance. Pt requires supervision for all transfers and ambulation with RW. Pt is at an increased risk for falls due to history of falls, increased level of A required and decreased static and dynamic standing balance. Pt is currently not functioning at baseline and would benefit from skilled PT services in order to address aforementioned impairments, return to PLOF, increase functional mobility, decrease caregiver burden and improve QOL. Given discussed impairments, patient PMH, medical management, pt current medical status is unstable/unpredictable. Pt recommendation is level III at discharge.         Rehab Resource Intensity Level, PT: III (Minimum Resource Intensity)    See flowsheet documentation for full assessment.

## 2024-08-17 LAB
ALBUMIN SERPL BCG-MCNC: 3.4 G/DL (ref 3.5–5)
ALP SERPL-CCNC: 72 U/L (ref 34–104)
ALT SERPL W P-5'-P-CCNC: 25 U/L (ref 7–52)
ANION GAP SERPL CALCULATED.3IONS-SCNC: 6 MMOL/L (ref 4–13)
AST SERPL W P-5'-P-CCNC: 25 U/L (ref 13–39)
BASOPHILS # BLD AUTO: 0.06 THOUSANDS/ÂΜL (ref 0–0.1)
BASOPHILS NFR BLD AUTO: 1 % (ref 0–1)
BILIRUB SERPL-MCNC: 0.89 MG/DL (ref 0.2–1)
BUN SERPL-MCNC: 31 MG/DL (ref 5–25)
CALCIUM ALBUM COR SERPL-MCNC: 9.2 MG/DL (ref 8.3–10.1)
CALCIUM SERPL-MCNC: 8.7 MG/DL (ref 8.4–10.2)
CHLORIDE SERPL-SCNC: 97 MMOL/L (ref 96–108)
CO2 SERPL-SCNC: 29 MMOL/L (ref 21–32)
CREAT SERPL-MCNC: 1.1 MG/DL (ref 0.6–1.3)
EOSINOPHIL # BLD AUTO: 0.4 THOUSAND/ÂΜL (ref 0–0.61)
EOSINOPHIL NFR BLD AUTO: 6 % (ref 0–6)
ERYTHROCYTE [DISTWIDTH] IN BLOOD BY AUTOMATED COUNT: 14.5 % (ref 11.6–15.1)
GFR SERPL CREATININE-BSD FRML MDRD: 47 ML/MIN/1.73SQ M
GLUCOSE SERPL-MCNC: 124 MG/DL (ref 65–140)
GLUCOSE SERPL-MCNC: 178 MG/DL (ref 65–140)
GLUCOSE SERPL-MCNC: 192 MG/DL (ref 65–140)
GLUCOSE SERPL-MCNC: 80 MG/DL (ref 65–140)
GLUCOSE SERPL-MCNC: 83 MG/DL (ref 65–140)
HCT VFR BLD AUTO: 35.5 % (ref 34.8–46.1)
HGB BLD-MCNC: 11.3 G/DL (ref 11.5–15.4)
IMM GRANULOCYTES # BLD AUTO: 0.04 THOUSAND/UL (ref 0–0.2)
IMM GRANULOCYTES NFR BLD AUTO: 1 % (ref 0–2)
LYMPHOCYTES # BLD AUTO: 1.87 THOUSANDS/ÂΜL (ref 0.6–4.47)
LYMPHOCYTES NFR BLD AUTO: 27 % (ref 14–44)
MCH RBC QN AUTO: 28.3 PG (ref 26.8–34.3)
MCHC RBC AUTO-ENTMCNC: 31.8 G/DL (ref 31.4–37.4)
MCV RBC AUTO: 89 FL (ref 82–98)
MONOCYTES # BLD AUTO: 0.92 THOUSAND/ÂΜL (ref 0.17–1.22)
MONOCYTES NFR BLD AUTO: 13 % (ref 4–12)
NEUTROPHILS # BLD AUTO: 3.58 THOUSANDS/ÂΜL (ref 1.85–7.62)
NEUTS SEG NFR BLD AUTO: 52 % (ref 43–75)
NRBC BLD AUTO-RTO: 0 /100 WBCS
PLATELET # BLD AUTO: 237 THOUSANDS/UL (ref 149–390)
PMV BLD AUTO: 10.6 FL (ref 8.9–12.7)
POTASSIUM SERPL-SCNC: 3.7 MMOL/L (ref 3.5–5.3)
PROT SERPL-MCNC: 6.2 G/DL (ref 6.4–8.4)
RBC # BLD AUTO: 3.99 MILLION/UL (ref 3.81–5.12)
SODIUM SERPL-SCNC: 132 MMOL/L (ref 135–147)
WBC # BLD AUTO: 6.87 THOUSAND/UL (ref 4.31–10.16)

## 2024-08-17 PROCEDURE — 94640 AIRWAY INHALATION TREATMENT: CPT

## 2024-08-17 PROCEDURE — 99232 SBSQ HOSP IP/OBS MODERATE 35: CPT | Performed by: FAMILY MEDICINE

## 2024-08-17 PROCEDURE — 85025 COMPLETE CBC W/AUTO DIFF WBC: CPT

## 2024-08-17 PROCEDURE — 99232 SBSQ HOSP IP/OBS MODERATE 35: CPT | Performed by: INTERNAL MEDICINE

## 2024-08-17 PROCEDURE — 94760 N-INVAS EAR/PLS OXIMETRY 1: CPT

## 2024-08-17 PROCEDURE — 80053 COMPREHEN METABOLIC PANEL: CPT

## 2024-08-17 PROCEDURE — 82948 REAGENT STRIP/BLOOD GLUCOSE: CPT

## 2024-08-17 RX ORDER — NYSTATIN 100000 [USP'U]/G
POWDER TOPICAL 2 TIMES DAILY PRN
Status: DISCONTINUED | OUTPATIENT
Start: 2024-08-17 | End: 2024-08-20 | Stop reason: HOSPADM

## 2024-08-17 RX ADMIN — FLECAINIDE ACETATE 150 MG: 50 TABLET ORAL at 08:55

## 2024-08-17 RX ADMIN — Medication 1000 UNITS: at 08:55

## 2024-08-17 RX ADMIN — OXYCODONE HYDROCHLORIDE AND ACETAMINOPHEN 500 MG: 500 TABLET ORAL at 08:55

## 2024-08-17 RX ADMIN — FLECAINIDE ACETATE 150 MG: 50 TABLET ORAL at 17:52

## 2024-08-17 RX ADMIN — GABAPENTIN 300 MG: 300 CAPSULE ORAL at 21:28

## 2024-08-17 RX ADMIN — GUAIFENESIN AND DEXTROMETHORPHAN 10 ML: 100; 10 SYRUP ORAL at 17:57

## 2024-08-17 RX ADMIN — METOPROLOL SUCCINATE 75 MG: 50 TABLET, EXTENDED RELEASE ORAL at 17:51

## 2024-08-17 RX ADMIN — LEVALBUTEROL HYDROCHLORIDE 1.25 MG: 1.25 SOLUTION RESPIRATORY (INHALATION) at 13:34

## 2024-08-17 RX ADMIN — FAMOTIDINE 20 MG: 20 TABLET, FILM COATED ORAL at 17:51

## 2024-08-17 RX ADMIN — LORATADINE 10 MG: 10 TABLET ORAL at 08:55

## 2024-08-17 RX ADMIN — BUMETANIDE 2 MG: 1 TABLET ORAL at 08:55

## 2024-08-17 RX ADMIN — FAMOTIDINE 20 MG: 20 TABLET, FILM COATED ORAL at 08:55

## 2024-08-17 RX ADMIN — DILTIAZEM HYDROCHLORIDE 120 MG: 120 CAPSULE, COATED, EXTENDED RELEASE ORAL at 08:55

## 2024-08-17 RX ADMIN — LEVALBUTEROL HYDROCHLORIDE 1.25 MG: 1.25 SOLUTION RESPIRATORY (INHALATION) at 07:33

## 2024-08-17 RX ADMIN — INSULIN LISPRO 1 UNITS: 100 INJECTION, SOLUTION INTRAVENOUS; SUBCUTANEOUS at 17:52

## 2024-08-17 RX ADMIN — APIXABAN 5 MG: 5 TABLET, FILM COATED ORAL at 17:51

## 2024-08-17 RX ADMIN — INSULIN GLARGINE 10 UNITS: 100 INJECTION, SOLUTION SUBCUTANEOUS at 21:28

## 2024-08-17 RX ADMIN — LEVALBUTEROL HYDROCHLORIDE 1.25 MG: 1.25 SOLUTION RESPIRATORY (INHALATION) at 19:58

## 2024-08-17 RX ADMIN — GUAIFENESIN AND DEXTROMETHORPHAN 10 ML: 100; 10 SYRUP ORAL at 22:31

## 2024-08-17 RX ADMIN — BENZONATATE 100 MG: 100 CAPSULE ORAL at 17:57

## 2024-08-17 RX ADMIN — ROPINIROLE HYDROCHLORIDE 2 MG: 1 TABLET, FILM COATED ORAL at 21:28

## 2024-08-17 RX ADMIN — APIXABAN 5 MG: 5 TABLET, FILM COATED ORAL at 08:55

## 2024-08-17 RX ADMIN — ZOLPIDEM TARTRATE 10 MG: 5 TABLET, COATED ORAL at 21:28

## 2024-08-17 RX ADMIN — GUAIFENESIN AND DEXTROMETHORPHAN 10 ML: 100; 10 SYRUP ORAL at 12:17

## 2024-08-17 RX ADMIN — BENZONATATE 100 MG: 100 CAPSULE ORAL at 12:17

## 2024-08-17 RX ADMIN — EZETIMIBE 10 MG: 10 TABLET ORAL at 21:28

## 2024-08-17 NOTE — ASSESSMENT & PLAN NOTE
Recent Labs     08/15/24  0655 08/16/24  0542 08/17/24  0510   CREATININE 1.39* 1.16 1.10   EGFR 35 44 47     Estimated Creatinine Clearance: 38 mL/min (by C-G formula based on SCr of 1.1 mg/dL).    Elevated creatinine, continue to monitor    Plan:  PO Bumex 2mg daily  Monitor BMP  Avoid hypoperfusion of the kidneys, minimize nephrotoxins  Currently at baseline, continue to monitor with daily labs

## 2024-08-17 NOTE — ASSESSMENT & PLAN NOTE
Pt was consulted by palliative care and daughter was present in room as POA, discussed hospice topic as her condition is a stepwise progression.  Continued discussion with goals of care going forward as pt's prognosis progresses.   As of 8/17 planning for home hospice following Asept catheter placement.

## 2024-08-17 NOTE — PLAN OF CARE

## 2024-08-17 NOTE — ASSESSMENT & PLAN NOTE
Lab Results   Component Value Date    LVEF 50 07/14/2024    BNP 1,091 (H) 08/06/2024    BNP 1,428 (H) 07/24/2024     Acute and chronic respiratory failure with hypoxia 2/2 CHF exacerbation a/e/b hypoxia, increased oxygen demands requiring CXR, Nebs, PO bumex and 4L NC oxygen.    Presents with increased shortness of breath, dyspnea on exertion, lower extremity edema, and cough with productive sputum  Patient is currently volume overloaded.  Initial hx: 3 to 4-day history of progressively worsening dyspnea, dry cough, worsening lower extremity edema.  She required increased oxygen when EMS arrived. difficulty talking normal sentences secondary to shortness of breath.  She is now unable to lie flat.  She was discharged on recent admission with 10 mg torsemide daily.  Clinical suspicion is under diuresis leading to volume overload.  Findings: ED provider: She is at baseline on 2 L O2 supplemental O2 and, because of hypoxia to the 80s, was bumped up to 4.  On arrival 97% on 4L (baseline chronic 2L NC)  Currently pt is on room air saturating appopriately for her, diuresed approximately 4.7L of fluid since admission, improving SOB   Was seen by heart failure team 8/12    Serial cxr shows stable pleural effusion   Most recent Echo 50% LVEF     Weight (last 2 days)       Date/Time Weight    08/17/24 0600 72.3 (159.39)    08/16/24 0600 72.6 (160.05)    08/15/24 05:50:45 73 (160.94)          I/O: - 3.9 L since admission    Plan:  Per cardiology: PO Bumex 2 mg daily   B-Blocker: Toprol  Cardiac diet, sodium restriction  CMP, magnesium a.m; Goal Mg > 2 and K > 4; Replete prn  Daily standing weights, Measure I/O  Thoracocentesis performed, 1300 mL clear yellow fluid drained from right pleural space.   Discussed with cardiology and appropriate maintenance diuretic dose that is adequate for discharge.

## 2024-08-17 NOTE — ASSESSMENT & PLAN NOTE
Discussion with home hospice, daughter will discuss with patient.  Planning for home hospice following Asept catheter placement 8/19.

## 2024-08-17 NOTE — PROGRESS NOTES
[facility]  Progress Note  Name: Farnaz Martin I  MRN: [id number]  Unit/Bed#: S -01 I Date of Admission: 8/6/2024   Date of Service: 8/17/2024 I Hospital Day: 11    Assessment & Plan   * Acute on chronic heart failure (HCC)  Assessment & Plan    Lab Results   Component Value Date    LVEF 50 07/14/2024    BNP 1,091 (H) 08/06/2024    BNP 1,428 (H) 07/24/2024     Acute and chronic respiratory failure with hypoxia 2/2 CHF exacerbation a/e/b hypoxia, increased oxygen demands requiring CXR, Nebs, PO bumex and 4L NC oxygen.    Presents with increased shortness of breath, dyspnea on exertion, lower extremity edema, and cough with productive sputum  Patient is currently volume overloaded.  Initial hx: 3 to 4-day history of progressively worsening dyspnea, dry cough, worsening lower extremity edema.  She required increased oxygen when EMS arrived. difficulty talking normal sentences secondary to shortness of breath.  She is now unable to lie flat.  She was discharged on recent admission with 10 mg torsemide daily.  Clinical suspicion is under diuresis leading to volume overload.  Findings: ED provider: She is at baseline on 2 L O2 supplemental O2 and, because of hypoxia to the 80s, was bumped up to 4.  On arrival 97% on 4L (baseline chronic 2L NC)  Currently pt is on room air saturating appopriately for her, diuresed approximately 4.7L of fluid since admission, improving SOB   Was seen by heart failure team 8/12    Serial cxr shows stable pleural effusion   Most recent Echo 50% LVEF     Weight (last 2 days)       Date/Time Weight    08/17/24 0600 72.3 (159.39)    08/16/24 0600 72.6 (160.05)    08/15/24 05:50:45 73 (160.94)          I/O: - 3.9 L since admission    Plan:  Per cardiology: PO Bumex 2 mg daily   B-Blocker: Toprol  Cardiac diet, sodium restriction  CMP, magnesium a.m; Goal Mg > 2 and K > 4; Replete prn  Daily standing weights, Measure I/O  Thoracocentesis performed, 1300 mL clear  yellow fluid drained from right pleural space.   Discussed with cardiology and appropriate maintenance diuretic dose that is adequate for discharge.          Recurrent pleural effusion on right  Assessment & Plan  Thoracocentesis performed, 1300 mL clear yellow fluid drained from right pleural space.   Plan for IR procedure on 8/19 for pleural catheter placement prior to discharge     Palliative care patient  Assessment & Plan  Pt was consulted by palliative care and daughter was present in room as POA, discussed hospice topic as her condition is a stepwise progression.  Continued discussion with goals of care going forward as pt's prognosis progresses.   As of 8/17 planning for home hospice following Asept catheter placement.     Elevated serum creatinine  Assessment & Plan    Recent Labs     08/15/24  0655 08/16/24  0542 08/17/24  0510   CREATININE 1.39* 1.16 1.10   EGFR 35 44 47     Estimated Creatinine Clearance: 38 mL/min (by C-G formula based on SCr of 1.1 mg/dL).    Elevated creatinine, continue to monitor    Plan:  PO Bumex 2mg daily  Monitor BMP  Avoid hypoperfusion of the kidneys, minimize nephrotoxins  Currently at baseline, continue to monitor with daily labs    Paroxysmal A-fib (HCC)  Assessment & Plan  Afib-  S/p ablation in 03/2021.   EKG- AV dual-paced rhythm, Vent. rate has decreased by 14 BPM since prior EKG  Low voltage QRS    Plan:  Eliquis 5 mg twice daily  Continue flecainide 150 mg twice daily and metoprolol succinate 75 mg twice daily  Continue diltiazem 120 mg daily    Cough  Assessment & Plan  Pt endorsing orthopnea, SOB  Productive in nature without production of sputum  Uses Spiriva inhaler and Robitussin at home for sx's  R/p chest x-ray shows increased congestion, persistent pleural effusion that is worsening on chest x-rays  Cough component likely CHF exacerbation  Pulmonology was consulted, recommended no procedural interventions at this time, but recommended   -Continue XOPENEX  inhalation solution 1.25 mg   -Continue home inhalers and nebulizer treatment on discharge.   -Possible home oxygen evaluation   - s/p thoracocentesis , fatigue and lethargy improved.      Plan:  Atrovent neb four times daily  XOPENEX inhalation solution 1.25 mg   Continue home inhalers and nebulizer treatment on discharge.   Possible home oxygen evaluation   Thoracocentesis performed, 1300 mL clear yellow fluid drained from right pleural space.   Monitor oxygen status today, wean as tolerated  Plan for IR procedure on  for pleural catheter placement prior to discharge      Goals of care, counseling/discussion  Assessment & Plan  Discussion with home hospice, daughter will discuss with patient.  Planning for home hospice following Asept catheter placement .            VTE Pharmacologic Prophylaxis: VTE Score: 5 High Risk (Score >/= 5) - Pharmacological DVT Prophylaxis Ordered: apixaban (Eliquis). Sequential Compression Devices Ordered.    Mobility:   Basic Mobility Inpatient Raw Score: 19  JH-HLM Goal: 6: Walk 10 steps or more  JH-HLM Achieved: 7: Walk 25 feet or more  JH-HLM Goal NOT achieved. Continue with multidisciplinary rounding and encourage appropriate mobility to improve upon JH-HLM goals.    Patient Centered Rounds:  rounds w/ fm team  Discussions with Specialists or Other Care Team Provider: Dr. Pabon    Education and Discussions with Family / Patient: Updated  (daughter) via phone.    Current Length of Stay: 11 day(s)  Current Patient Status: Inpatient   Discharge Plan: Anticipate discharge in 48-72 hrs to home with home services.    Code Status: Level 3 - DNAR and DNI    Subjective:   Pt seen at bedside. Comfortable on 1L O2. Denies CP, SOB, abdominal pain, N/V. Daughter on speaker phone with patient when seen in room. All questions answered.     Objective:     Vitals:   Temp (24hrs), Av.8 °F (36.6 °C), Min:97.7 °F (36.5 °C), Max:97.9 °F (36.6 °C)    Temp:  [97.7 °F (36.5  °C)-97.9 °F (36.6 °C)] 97.9 °F (36.6 °C)  HR:  [82-97] 97  Resp:  [17-20] 17  BP: (108-136)/(67-85) 136/85  SpO2:  [97 %-99 %] 99 %  Body mass index is 29.15 kg/m².     Input and Output Summary (last 24 hours):     Intake/Output Summary (Last 24 hours) at 8/17/2024 0853  Last data filed at 8/17/2024 0511  Gross per 24 hour   Intake 600 ml   Output 2025 ml   Net -1425 ml       Physical Exam:   Physical Exam  Constitutional:       General: She is not in acute distress.     Appearance: She is ill-appearing. She is not toxic-appearing.   HENT:      Head: Normocephalic and atraumatic.      Nose: Nose normal.      Mouth/Throat:      Pharynx: Oropharynx is clear.   Eyes:      Conjunctiva/sclera: Conjunctivae normal.   Cardiovascular:      Rate and Rhythm: Normal rate and regular rhythm.   Pulmonary:      Effort: Pulmonary effort is normal.      Breath sounds: No wheezing, rhonchi or rales.      Comments: Absent breath sounds R lower lung fields  Abdominal:      Palpations: Abdomen is soft.      Tenderness: There is no abdominal tenderness. There is no guarding.   Musculoskeletal:      Right lower leg: No edema.      Left lower leg: No edema.   Skin:     General: Skin is warm and dry.   Neurological:      Mental Status: She is alert and oriented to person, place, and time. Mental status is at baseline.   Psychiatric:         Mood and Affect: Mood normal.         Behavior: Behavior normal.         Thought Content: Thought content normal.         Judgment: Judgment normal.          Additional Data:     Labs:  Results from last 7 days   Lab Units 08/17/24  0510   WBC Thousand/uL 6.87   HEMOGLOBIN g/dL 11.3*   HEMATOCRIT % 35.5   PLATELETS Thousands/uL 237   SEGS PCT % 52   LYMPHO PCT % 27   MONO PCT % 13*   EOS PCT % 6     Results from last 7 days   Lab Units 08/17/24  0510   SODIUM mmol/L 132*   POTASSIUM mmol/L 3.7   CHLORIDE mmol/L 97   CO2 mmol/L 29   BUN mg/dL 31*   CREATININE mg/dL 1.10   ANION GAP mmol/L 6   CALCIUM  mg/dL 8.7   ALBUMIN g/dL 3.4*   TOTAL BILIRUBIN mg/dL 0.89   ALK PHOS U/L 72   ALT U/L 25   AST U/L 25   GLUCOSE RANDOM mg/dL 80         Results from last 7 days   Lab Units 08/17/24  0759 08/16/24  2052 08/16/24  1557 08/16/24  1153 08/16/24  0717 08/15/24  2100 08/15/24  1526 08/15/24  1147 08/15/24  0817 08/14/24  2105 08/14/24  1527 08/14/24  1139   POC GLUCOSE mg/dl 83 187* 143* 204* 94 210* 201* 152* 136 170* 83 111               Lines/Drains:  Invasive Devices       Peripheral Intravenous Line  Duration             Peripheral IV 08/17/24 Distal;Dorsal (posterior);Left Forearm <1 day                          Imaging: Reviewed radiology reports from this admission including: chest xray    Recent Cultures (last 7 days):   Results from last 7 days   Lab Units 08/13/24  1259   GRAM STAIN RESULT  1+ Polys  No bacteria seen   BODY FLUID CULTURE, STERILE  No growth       Last 24 Hours Medication List:   Current Facility-Administered Medications   Medication Dose Route Frequency Provider Last Rate    acetaminophen  650 mg Oral Q8H PRN Steve Tripathi MD      albuterol  2 puff Inhalation Q6H PRN Steve Tripathi MD      apixaban  5 mg Oral BID Steve Tripathi MD      ascorbic acid  500 mg Oral Daily Steve Tripathi MD      benzonatate  100 mg Oral TID PRN Steve Tripathi MD      bumetanide  2 mg Oral Daily Steve Tripathi MD      Cholecalciferol  1,000 Units Oral Daily Steve Tripathi MD      dextromethorphan-guaiFENesin  10 mL Oral Q4H PRN Odin Castro MD      diltiazem  120 mg Oral Daily Steve Tripathi MD      ezetimibe  10 mg Oral HS Steve Tripathi MD      famotidine  20 mg Oral BID Steve Tripathi MD      flecainide  150 mg Oral BID Steve Tripathi MD      gabapentin  300 mg Oral HS Steve Tripathi MD      insulin glargine  10 Units Subcutaneous HS Steve Tripathi MD      insulin lispro  1-5 Units Subcutaneous TID AC Steve Tripathi MD      levalbuterol  1.25 mg Nebulization TID Fauzia Pennington MD      loratadine  10 mg  Oral Daily Steve Tripathi MD      metoprolol succinate  75 mg Oral Q12H Steve Tripathi MD      rOPINIRole  2 mg Oral HS Steve Tripathi MD      zolpidem  10 mg Oral HS Steve Tripathi MD          Today, Patient Was Seen By: Shaheen Tello DO    **Please Note: This note may have been constructed using a voice recognition system.**

## 2024-08-17 NOTE — PROGRESS NOTES
Progress Note - Cardiology   Farnaz Martin 79 y.o. female MRN: [id number]  Unit/Bed#: S -01 Encounter: [id number]    Assessment:  #. Acute on chronic HFpEF  #. Recurrent R pleural effusion  #. PAF  #. SSS s/p PPM  #. KIMBERLEY    Patient seen and examined this AM. Renal function stable. Good response to oral diuretic regimen.     Plan:  -Continue bumex 2 mg daily  -Continue remainder of cardiac meds  -Reviewed IR plans for R tunneled pleural catheter on 8/19  -Plan is for short-term rehab and hospice    Cardiology will sign off at this time and be available as needed. Please call with questions.    Subjective/Objective     Subjective: Patient seen & examined. No acute events overnight.    Objective:   Vitals: /85   Pulse 97   Temp 97.9 °F (36.6 °C)   Resp 17   Wt 72.3 kg (159 lb 6.3 oz)   LMP 02/01/1990 (Within Weeks)   SpO2 99%   BMI 29.15 kg/m²   Vitals:    08/16/24 0600 08/17/24 0600   Weight: 72.6 kg (160 lb 0.9 oz) 72.3 kg (159 lb 6.3 oz)     Orthostatic Blood Pressures      Flowsheet Row Most Recent Value   Blood Pressure 136/85 filed at 08/17/2024 0721   Patient Position - Orthostatic VS Sitting filed at 08/16/2024 0712              Intake/Output Summary (Last 24 hours) at 8/17/2024 1022  Last data filed at 8/17/2024 0901  Gross per 24 hour   Intake 720 ml   Output 2325 ml   Net -1605 ml       Invasive Devices       Peripheral Intravenous Line  Duration             Peripheral IV 08/17/24 Distal;Dorsal (posterior);Left Forearm <1 day                  Physical Exam: General appearance: alert and oriented, in no acute distress  Lungs: R base dull, no wheezing, no rhonchi   Heart: RRR, no murmur, no gallop  Extremities: extremities normal, warm and well-perfused; no cyanosis, clubbing, or edema  Skin: warm & dry    Lab Results: I have personally reviewed pertinent lab results.    Imaging: I have personally reviewed pertinent reports.    EKG: Personally reviewed

## 2024-08-17 NOTE — PLAN OF CARE

## 2024-08-18 LAB
ANION GAP SERPL CALCULATED.3IONS-SCNC: 5 MMOL/L (ref 4–13)
BUN SERPL-MCNC: 24 MG/DL (ref 5–25)
CALCIUM SERPL-MCNC: 9 MG/DL (ref 8.4–10.2)
CHLORIDE SERPL-SCNC: 98 MMOL/L (ref 96–108)
CO2 SERPL-SCNC: 33 MMOL/L (ref 21–32)
CREAT SERPL-MCNC: 1.02 MG/DL (ref 0.6–1.3)
GFR SERPL CREATININE-BSD FRML MDRD: 52 ML/MIN/1.73SQ M
GLUCOSE SERPL-MCNC: 105 MG/DL (ref 65–140)
GLUCOSE SERPL-MCNC: 148 MG/DL (ref 65–140)
GLUCOSE SERPL-MCNC: 165 MG/DL (ref 65–140)
GLUCOSE SERPL-MCNC: 244 MG/DL (ref 65–140)
GLUCOSE SERPL-MCNC: 65 MG/DL (ref 65–140)
GLUCOSE SERPL-MCNC: 69 MG/DL (ref 65–140)
POTASSIUM SERPL-SCNC: 3.8 MMOL/L (ref 3.5–5.3)
SODIUM SERPL-SCNC: 136 MMOL/L (ref 135–147)

## 2024-08-18 PROCEDURE — 82948 REAGENT STRIP/BLOOD GLUCOSE: CPT

## 2024-08-18 PROCEDURE — 94640 AIRWAY INHALATION TREATMENT: CPT

## 2024-08-18 PROCEDURE — 80048 BASIC METABOLIC PNL TOTAL CA: CPT

## 2024-08-18 PROCEDURE — 99232 SBSQ HOSP IP/OBS MODERATE 35: CPT | Performed by: FAMILY MEDICINE

## 2024-08-18 PROCEDURE — 94760 N-INVAS EAR/PLS OXIMETRY 1: CPT

## 2024-08-18 RX ADMIN — LORATADINE 10 MG: 10 TABLET ORAL at 09:05

## 2024-08-18 RX ADMIN — LEVALBUTEROL HYDROCHLORIDE 1.25 MG: 1.25 SOLUTION RESPIRATORY (INHALATION) at 07:38

## 2024-08-18 RX ADMIN — BENZONATATE 100 MG: 100 CAPSULE ORAL at 13:22

## 2024-08-18 RX ADMIN — ROPINIROLE HYDROCHLORIDE 2 MG: 1 TABLET, FILM COATED ORAL at 21:31

## 2024-08-18 RX ADMIN — Medication 1000 UNITS: at 09:05

## 2024-08-18 RX ADMIN — FLECAINIDE ACETATE 150 MG: 50 TABLET ORAL at 17:37

## 2024-08-18 RX ADMIN — BUMETANIDE 2 MG: 1 TABLET ORAL at 09:05

## 2024-08-18 RX ADMIN — LEVALBUTEROL HYDROCHLORIDE 1.25 MG: 1.25 SOLUTION RESPIRATORY (INHALATION) at 14:11

## 2024-08-18 RX ADMIN — METOPROLOL SUCCINATE 75 MG: 50 TABLET, EXTENDED RELEASE ORAL at 17:37

## 2024-08-18 RX ADMIN — APIXABAN 5 MG: 5 TABLET, FILM COATED ORAL at 09:05

## 2024-08-18 RX ADMIN — GUAIFENESIN AND DEXTROMETHORPHAN 10 ML: 100; 10 SYRUP ORAL at 13:22

## 2024-08-18 RX ADMIN — LEVALBUTEROL HYDROCHLORIDE 1.25 MG: 1.25 SOLUTION RESPIRATORY (INHALATION) at 21:06

## 2024-08-18 RX ADMIN — FAMOTIDINE 20 MG: 20 TABLET, FILM COATED ORAL at 17:37

## 2024-08-18 RX ADMIN — DILTIAZEM HYDROCHLORIDE 120 MG: 120 CAPSULE, COATED, EXTENDED RELEASE ORAL at 09:05

## 2024-08-18 RX ADMIN — GABAPENTIN 300 MG: 300 CAPSULE ORAL at 21:31

## 2024-08-18 RX ADMIN — OXYCODONE HYDROCHLORIDE AND ACETAMINOPHEN 500 MG: 500 TABLET ORAL at 09:05

## 2024-08-18 RX ADMIN — INSULIN GLARGINE 10 UNITS: 100 INJECTION, SOLUTION SUBCUTANEOUS at 21:31

## 2024-08-18 RX ADMIN — FAMOTIDINE 20 MG: 20 TABLET, FILM COATED ORAL at 09:05

## 2024-08-18 RX ADMIN — ZOLPIDEM TARTRATE 10 MG: 5 TABLET, COATED ORAL at 21:31

## 2024-08-18 RX ADMIN — METOPROLOL SUCCINATE 75 MG: 50 TABLET, EXTENDED RELEASE ORAL at 05:49

## 2024-08-18 RX ADMIN — FLECAINIDE ACETATE 150 MG: 50 TABLET ORAL at 09:05

## 2024-08-18 RX ADMIN — EZETIMIBE 10 MG: 10 TABLET ORAL at 21:31

## 2024-08-18 NOTE — ASSESSMENT & PLAN NOTE
Afib-  S/p ablation in 03/2021.   EKG- AV dual-paced rhythm, Vent. rate has decreased by 14 BPM since prior EKG  Low voltage QRS    Plan:  Eliquis 5 mg twice daily, hold night and morning dose prior to procedure  Continue flecainide 150 mg twice daily and metoprolol succinate 75 mg twice daily  Continue diltiazem 120 mg daily

## 2024-08-18 NOTE — ASSESSMENT & PLAN NOTE
Lab Results   Component Value Date    LVEF 50 07/14/2024    BNP 1,091 (H) 08/06/2024    BNP 1,428 (H) 07/24/2024     Acute and chronic respiratory failure with hypoxia 2/2 CHF exacerbation a/e/b hypoxia, increased oxygen demands requiring CXR, Nebs, PO bumex and 4L NC oxygen.    Presents with increased shortness of breath, dyspnea on exertion, lower extremity edema, and cough with productive sputum  Patient is currently volume overloaded.  Initial hx: 3 to 4-day history of progressively worsening dyspnea, dry cough, worsening lower extremity edema.  She required increased oxygen when EMS arrived. difficulty talking normal sentences secondary to shortness of breath.  She is now unable to lie flat.  She was discharged on recent admission with 10 mg torsemide daily.  Clinical suspicion is under diuresis leading to volume overload.  Findings: ED provider: She is at baseline on 2 L O2 supplemental O2 and, because of hypoxia to the 80s, was bumped up to 4.  On arrival 97% on 4L (baseline chronic 2L NC)  Currently pt is on room air saturating appopriately for her, diuresed approximately 4.7L of fluid since admission, improving SOB   Was seen by heart failure team 8/12    Serial cxr shows stable pleural effusion   Most recent Echo 50% LVEF     Weight (last 2 days)       Date/Time Weight    08/17/24 0600 72.3 (159.39)    08/16/24 0600 72.6 (160.05)          I/O: - 4.55 L since admission    Plan:  Per cardiology: PO Bumex 2 mg daily   B-Blocker: Toprol  Cardiac diet, sodium restriction  CMP, magnesium a.m; Goal Mg > 2 and K > 4; Replete prn  Daily standing weights, Measure I/O  Thoracocentesis performed, 1300 mL clear yellow fluid drained from right pleural space.   Discussed with cardiology and appropriate maintenance diuretic dose that is adequate for discharge.

## 2024-08-18 NOTE — PROGRESS NOTES
[facility]  Progress Note  Name: Farnaz Martin I  MRN: [id number]  Unit/Bed#: S -01 I Date of Admission: 8/6/2024   Date of Service: 8/18/2024 I Hospital Day: 12    Assessment & Plan   * Acute on chronic heart failure (HCC)  Assessment & Plan    Lab Results   Component Value Date    LVEF 50 07/14/2024    BNP 1,091 (H) 08/06/2024    BNP 1,428 (H) 07/24/2024     Acute and chronic respiratory failure with hypoxia 2/2 CHF exacerbation a/e/b hypoxia, increased oxygen demands requiring CXR, Nebs, PO bumex and 4L NC oxygen.    Presents with increased shortness of breath, dyspnea on exertion, lower extremity edema, and cough with productive sputum  Patient is currently volume overloaded.  Initial hx: 3 to 4-day history of progressively worsening dyspnea, dry cough, worsening lower extremity edema.  She required increased oxygen when EMS arrived. difficulty talking normal sentences secondary to shortness of breath.  She is now unable to lie flat.  She was discharged on recent admission with 10 mg torsemide daily.  Clinical suspicion is under diuresis leading to volume overload.  Findings: ED provider: She is at baseline on 2 L O2 supplemental O2 and, because of hypoxia to the 80s, was bumped up to 4.  On arrival 97% on 4L (baseline chronic 2L NC)  Currently pt is on room air saturating appopriately for her, diuresed approximately 4.7L of fluid since admission, improving SOB   Was seen by heart failure team 8/12    Serial cxr shows stable pleural effusion   Most recent Echo 50% LVEF     Weight (last 2 days)       Date/Time Weight    08/17/24 0600 72.3 (159.39)    08/16/24 0600 72.6 (160.05)          I/O: - 4.55 L since admission    Plan:  Per cardiology: PO Bumex 2 mg daily   B-Blocker: Toprol  Cardiac diet, sodium restriction  CMP, magnesium a.m; Goal Mg > 2 and K > 4; Replete prn  Daily standing weights, Measure I/O  Thoracocentesis performed, 1300 mL clear yellow fluid drained from right  pleural space.   Discussed with cardiology and appropriate maintenance diuretic dose that is adequate for discharge.          Elevated serum creatinine  Assessment & Plan    Recent Labs     08/15/24  0655 08/16/24  0542 08/17/24  0510   CREATININE 1.39* 1.16 1.10   EGFR 35 44 47     Estimated Creatinine Clearance: 38 mL/min (by C-G formula based on SCr of 1.1 mg/dL).    Elevated creatinine, continue to monitor    Plan:  PO Bumex 2mg daily  Monitor BMP  Avoid hypoperfusion of the kidneys, minimize nephrotoxins  Currently at baseline, continue to monitor with daily labs    Cough  Assessment & Plan  Pt endorsing orthopnea, SOB  Productive in nature without production of sputum  Uses Spiriva inhaler and Robitussin at home for sx's  R/p chest x-ray shows increased congestion, persistent pleural effusion that is worsening on chest x-rays  Cough component likely CHF exacerbation  Pulmonology was consulted, recommended no procedural interventions at this time, but recommended   -Continue XOPENEX inhalation solution 1.25 mg   -Continue home inhalers and nebulizer treatment on discharge.   -Possible home oxygen evaluation   - s/p thoracocentesis 8/13, fatigue and lethargy improved.      Plan:  Atrovent neb four times daily  XOPENEX inhalation solution 1.25 mg   Continue home inhalers and nebulizer treatment on discharge.   Possible home oxygen evaluation   Thoracocentesis performed, 1300 mL clear yellow fluid drained from right pleural space.   Monitor oxygen status today, wean as tolerated  Plan for IR procedure on 8/19 for pleural catheter placement prior to discharge      Paroxysmal A-fib (HCC)  Assessment & Plan  Afib-  S/p ablation in 03/2021.   EKG- AV dual-paced rhythm, Vent. rate has decreased by 14 BPM since prior EKG  Low voltage QRS    Plan:  Eliquis 5 mg twice daily, hold night and morning dose prior to procedure  Continue flecainide 150 mg twice daily and metoprolol succinate 75 mg twice daily  Continue diltiazem  120 mg daily    Palliative care patient  Assessment & Plan  Pt was consulted by palliative care and daughter was present in room as POA, discussed hospice topic as her condition is a stepwise progression.  Continued discussion with goals of care going forward as pt's prognosis progresses.   As of 8/17 planning for home hospice following Asept catheter placement on 8/19.     Recurrent pleural effusion on right  Assessment & Plan  Thoracocentesis performed, 1300 mL clear yellow fluid drained from right pleural space.   Plan for IR procedure on 8/19 for pleural catheter placement prior to discharge     Goals of care, counseling/discussion  Assessment & Plan  Discussion with home hospice, daughter will discuss with patient.  Planning for home hospice following Asept catheter placement 8/19.              VTE Pharmacologic Prophylaxis: VTE Score: 5 High Risk (Score >/= 5) - Pharmacological DVT Prophylaxis Ordered: apixaban (Eliquis). Sequential Compression Devices Ordered.    Mobility:   Basic Mobility Inpatient Raw Score: 19  JH-HLM Goal: 6: Walk 10 steps or more  JH-HLM Achieved: 7: Walk 25 feet or more  JH-HLM Goal achieved. Continue to encourage appropriate mobility.    Patient Centered Rounds: I performed bedside rounds with nursing staff today.   Discussions with Specialists or Other Care Team Provider: Cardiology, IR    Education and Discussions with Family / Patient: Updated  (daughter) via phone.    Total Time Spent on Date of Encounter in care of patient: 60 mins. This time was spent on one or more of the following: performing physical exam; counseling and coordination of care; obtaining or reviewing history; documenting in the medical record; reviewing/ordering tests, medications or procedures; communicating with other healthcare professionals and discussing with patient's family/caregivers.    Current Length of Stay: 12 day(s)  Current Patient Status: Inpatient   Certification Statement: The  patient will continue to require additional inpatient hospital stay due to Pleural catheter placement  Discharge Plan: Anticipate discharge tomorrow to rehab facility.    Code Status: Level 3 - DNAR and DNI    Subjective:   Evaluate patient at bedside, no acute events overnight.  She reports having no chest pain, shortness of breath is slowly improving.  Her cough has reduced since admission but has not resolved.  She has no other concerns or complaints, all questions were answered at bedside.    Objective:     Vitals:   Temp (24hrs), Av.3 °F (36.8 °C), Min:98.1 °F (36.7 °C), Max:98.4 °F (36.9 °C)    Temp:  [98.1 °F (36.7 °C)-98.4 °F (36.9 °C)] 98.1 °F (36.7 °C)  HR:  [] 102  Resp:  [16-20] 16  BP: (105-125)/(55-84) 125/84  SpO2:  [92 %-100 %] 98 %  Body mass index is 28.61 kg/m².     Input and Output Summary (last 24 hours):     Intake/Output Summary (Last 24 hours) at 2024 1048  Last data filed at 2024 0900  Gross per 24 hour   Intake 1040 ml   Output 1250 ml   Net -210 ml       Physical Exam:   Constitutional:       General: She is not in acute distress.     Appearance: She is ill-appearing. She is not toxic-appearing.   HENT:      Head: Normocephalic and atraumatic.      Nose: Nose normal.      Mouth/Throat:      Pharynx: Oropharynx is clear.   Eyes:      Conjunctiva/sclera: Conjunctivae normal.   Cardiovascular:      Rate and Rhythm: Normal rate and regular rhythm.   Pulmonary:      Effort: Pulmonary effort is normal.      Breath sounds: No wheezing, rhonchi or rales.      Comments: Absent breath sounds R lower lung fields  Abdominal:      Palpations: Abdomen is soft.      Tenderness: There is no abdominal tenderness. There is no guarding.   Musculoskeletal:      Right lower leg: No edema.      Left lower leg: No edema.   Skin:     General: Skin is warm and dry.   Neurological:      Mental Status: She is alert and oriented to person, place, and time. Mental status is at baseline.    Psychiatric:         Mood and Affect: Mood normal.         Behavior: Behavior normal.         Thought Content: Thought content normal.         Judgment: Judgment normal.     Additional Data:     Labs:  Results from last 7 days   Lab Units 08/17/24  0510   WBC Thousand/uL 6.87   HEMOGLOBIN g/dL 11.3*   HEMATOCRIT % 35.5   PLATELETS Thousands/uL 237   SEGS PCT % 52   LYMPHO PCT % 27   MONO PCT % 13*   EOS PCT % 6     Results from last 7 days   Lab Units 08/18/24  0604 08/17/24  0510   SODIUM mmol/L 136 132*   POTASSIUM mmol/L 3.8 3.7   CHLORIDE mmol/L 98 97   CO2 mmol/L 33* 29   BUN mg/dL 24 31*   CREATININE mg/dL 1.02 1.10   ANION GAP mmol/L 5 6   CALCIUM mg/dL 9.0 8.7   ALBUMIN g/dL  --  3.4*   TOTAL BILIRUBIN mg/dL  --  0.89   ALK PHOS U/L  --  72   ALT U/L  --  25   AST U/L  --  25   GLUCOSE RANDOM mg/dL 69 80         Results from last 7 days   Lab Units 08/18/24  0733 08/17/24  2055 08/17/24  1548 08/17/24  1137 08/17/24  0759 08/16/24  2052 08/16/24  1557 08/16/24  1153 08/16/24  0717 08/15/24  2100 08/15/24  1526 08/15/24  1147   POC GLUCOSE mg/dl 65 192* 178* 124 83 187* 143* 204* 94 210* 201* 152*               Lines/Drains:  Invasive Devices       Peripheral Intravenous Line  Duration             Peripheral IV 08/17/24 Distal;Dorsal (posterior);Left Forearm 1 day                  Imaging: Reviewed radiology reports from this admission including: chest xray    Recent Cultures (last 7 days):   Results from last 7 days   Lab Units 08/13/24  1259   GRAM STAIN RESULT  1+ Polys  No bacteria seen   BODY FLUID CULTURE, STERILE  No growth       Last 24 Hours Medication List:   Current Facility-Administered Medications   Medication Dose Route Frequency Provider Last Rate    acetaminophen  650 mg Oral Q8H PRN Steve Tripathi MD      albuterol  2 puff Inhalation Q6H PRN Steve Tripathi MD      [START ON 8/19/2024] apixaban  5 mg Oral Once Steve Tripathi MD      [START ON 8/20/2024] apixaban  5 mg Oral BID Steve  MD Deuce      ascorbic acid  500 mg Oral Daily Steve Tripathi MD      benzonatate  100 mg Oral TID PRN Steve Tripathi MD      bumetanide  2 mg Oral Daily Steve Tripathi MD      Cholecalciferol  1,000 Units Oral Daily Steve Tripathi MD      dextromethorphan-guaiFENesin  10 mL Oral Q4H PRN Odni Castro MD      diltiazem  120 mg Oral Daily Steve Tripathi MD      ezetimibe  10 mg Oral HS Steve Tripathi MD      famotidine  20 mg Oral BID Steve Tripathi MD      flecainide  150 mg Oral BID Steve Tripathi MD      gabapentin  300 mg Oral HS Steve Tripathi MD      insulin glargine  10 Units Subcutaneous HS Steve Tripathi MD      insulin lispro  1-5 Units Subcutaneous TID AC Steve Tripathi MD      levalbuterol  1.25 mg Nebulization TID Fauzia Pennington MD      loratadine  10 mg Oral Daily Steve Tripathi MD      metoprolol succinate  75 mg Oral Q12H Steve Tripathi MD      nystatin   Topical BID PRN Mya Quinn MD      rOPINIRole  2 mg Oral HS Steve Triapthi MD      zolpidem  10 mg Oral HS Steve Tripathi MD          Today, Patient Was Seen By: Steve Tripathi MD    **Please Note: This note may have been constructed using a voice recognition system.**

## 2024-08-18 NOTE — PLAN OF CARE

## 2024-08-18 NOTE — ASSESSMENT & PLAN NOTE
Pt was consulted by palliative care and daughter was present in room as POA, discussed hospice topic as her condition is a stepwise progression.  Continued discussion with goals of care going forward as pt's prognosis progresses.   As of 8/17 planning for home hospice following Asept catheter placement on 8/19.

## 2024-08-19 ENCOUNTER — APPOINTMENT (INPATIENT)
Dept: RADIOLOGY | Facility: HOSPITAL | Age: 79
DRG: 291 | End: 2024-08-19
Payer: MEDICARE

## 2024-08-19 LAB
ANION GAP SERPL CALCULATED.3IONS-SCNC: 5 MMOL/L (ref 4–13)
BASOPHILS # BLD AUTO: 0.08 THOUSANDS/ÂΜL (ref 0–0.1)
BASOPHILS NFR BLD AUTO: 1 % (ref 0–1)
BUN SERPL-MCNC: 27 MG/DL (ref 5–25)
CALCIUM SERPL-MCNC: 9.4 MG/DL (ref 8.4–10.2)
CHLORIDE SERPL-SCNC: 95 MMOL/L (ref 96–108)
CO2 SERPL-SCNC: 37 MMOL/L (ref 21–32)
CREAT SERPL-MCNC: 1.2 MG/DL (ref 0.6–1.3)
EOSINOPHIL # BLD AUTO: 0.31 THOUSAND/ÂΜL (ref 0–0.61)
EOSINOPHIL NFR BLD AUTO: 5 % (ref 0–6)
ERYTHROCYTE [DISTWIDTH] IN BLOOD BY AUTOMATED COUNT: 14.6 % (ref 11.6–15.1)
GFR SERPL CREATININE-BSD FRML MDRD: 42 ML/MIN/1.73SQ M
GLUCOSE SERPL-MCNC: 102 MG/DL (ref 65–140)
GLUCOSE SERPL-MCNC: 113 MG/DL (ref 65–140)
GLUCOSE SERPL-MCNC: 126 MG/DL (ref 65–140)
GLUCOSE SERPL-MCNC: 209 MG/DL (ref 65–140)
GLUCOSE SERPL-MCNC: 79 MG/DL (ref 65–140)
HCT VFR BLD AUTO: 38.7 % (ref 34.8–46.1)
HGB BLD-MCNC: 12 G/DL (ref 11.5–15.4)
IMM GRANULOCYTES # BLD AUTO: 0.02 THOUSAND/UL (ref 0–0.2)
IMM GRANULOCYTES NFR BLD AUTO: 0 % (ref 0–2)
INR PPP: 1.3 (ref 0.85–1.19)
LYMPHOCYTES # BLD AUTO: 2.01 THOUSANDS/ÂΜL (ref 0.6–4.47)
LYMPHOCYTES NFR BLD AUTO: 31 % (ref 14–44)
MCH RBC QN AUTO: 27.8 PG (ref 26.8–34.3)
MCHC RBC AUTO-ENTMCNC: 31 G/DL (ref 31.4–37.4)
MCV RBC AUTO: 90 FL (ref 82–98)
MONOCYTES # BLD AUTO: 0.77 THOUSAND/ÂΜL (ref 0.17–1.22)
MONOCYTES NFR BLD AUTO: 12 % (ref 4–12)
NEUTROPHILS # BLD AUTO: 3.33 THOUSANDS/ÂΜL (ref 1.85–7.62)
NEUTS SEG NFR BLD AUTO: 51 % (ref 43–75)
NRBC BLD AUTO-RTO: 0 /100 WBCS
PLATELET # BLD AUTO: 265 THOUSANDS/UL (ref 149–390)
PMV BLD AUTO: 10.7 FL (ref 8.9–12.7)
POTASSIUM SERPL-SCNC: 4.2 MMOL/L (ref 3.5–5.3)
PROTHROMBIN TIME: 16.9 SECONDS (ref 12.3–15)
RBC # BLD AUTO: 4.32 MILLION/UL (ref 3.81–5.12)
SODIUM SERPL-SCNC: 137 MMOL/L (ref 135–147)
WBC # BLD AUTO: 6.52 THOUSAND/UL (ref 4.31–10.16)

## 2024-08-19 PROCEDURE — 0W9930Z DRAINAGE OF RIGHT PLEURAL CAVITY WITH DRAINAGE DEVICE, PERCUTANEOUS APPROACH: ICD-10-PCS | Performed by: STUDENT IN AN ORGANIZED HEALTH CARE EDUCATION/TRAINING PROGRAM

## 2024-08-19 PROCEDURE — 94640 AIRWAY INHALATION TREATMENT: CPT

## 2024-08-19 PROCEDURE — 99232 SBSQ HOSP IP/OBS MODERATE 35: CPT | Performed by: FAMILY MEDICINE

## 2024-08-19 PROCEDURE — 99152 MOD SED SAME PHYS/QHP 5/>YRS: CPT | Performed by: STUDENT IN AN ORGANIZED HEALTH CARE EDUCATION/TRAINING PROGRAM

## 2024-08-19 PROCEDURE — NC001 PR NO CHARGE: Performed by: STUDENT IN AN ORGANIZED HEALTH CARE EDUCATION/TRAINING PROGRAM

## 2024-08-19 PROCEDURE — 82948 REAGENT STRIP/BLOOD GLUCOSE: CPT

## 2024-08-19 PROCEDURE — 85610 PROTHROMBIN TIME: CPT | Performed by: INTERNAL MEDICINE

## 2024-08-19 PROCEDURE — 76942 ECHO GUIDE FOR BIOPSY: CPT

## 2024-08-19 PROCEDURE — 85025 COMPLETE CBC W/AUTO DIFF WBC: CPT

## 2024-08-19 PROCEDURE — 80048 BASIC METABOLIC PNL TOTAL CA: CPT

## 2024-08-19 PROCEDURE — 94760 N-INVAS EAR/PLS OXIMETRY 1: CPT

## 2024-08-19 PROCEDURE — C1894 INTRO/SHEATH, NON-LASER: HCPCS

## 2024-08-19 PROCEDURE — 75989 ABSCESS DRAINAGE UNDER X-RAY: CPT | Performed by: STUDENT IN AN ORGANIZED HEALTH CARE EDUCATION/TRAINING PROGRAM

## 2024-08-19 PROCEDURE — 99152 MOD SED SAME PHYS/QHP 5/>YRS: CPT

## 2024-08-19 PROCEDURE — C1729 CATH, DRAINAGE: HCPCS

## 2024-08-19 PROCEDURE — 99153 MOD SED SAME PHYS/QHP EA: CPT

## 2024-08-19 PROCEDURE — 32552 REMOVE LUNG CATHETER: CPT | Performed by: STUDENT IN AN ORGANIZED HEALTH CARE EDUCATION/TRAINING PROGRAM

## 2024-08-19 RX ORDER — MIDAZOLAM HYDROCHLORIDE 2 MG/2ML
INJECTION, SOLUTION INTRAMUSCULAR; INTRAVENOUS AS NEEDED
Status: COMPLETED | OUTPATIENT
Start: 2024-08-19 | End: 2024-08-19

## 2024-08-19 RX ORDER — VANCOMYCIN HYDROCHLORIDE 1 G/200ML
1000 INJECTION, SOLUTION INTRAVENOUS ONCE
Status: COMPLETED | OUTPATIENT
Start: 2024-08-19 | End: 2024-08-19

## 2024-08-19 RX ORDER — FENTANYL CITRATE 50 UG/ML
INJECTION, SOLUTION INTRAMUSCULAR; INTRAVENOUS AS NEEDED
Status: COMPLETED | OUTPATIENT
Start: 2024-08-19 | End: 2024-08-19

## 2024-08-19 RX ADMIN — ACETAMINOPHEN 650 MG: 325 TABLET, FILM COATED ORAL at 10:41

## 2024-08-19 RX ADMIN — FAMOTIDINE 20 MG: 20 TABLET, FILM COATED ORAL at 08:08

## 2024-08-19 RX ADMIN — VANCOMYCIN HYDROCHLORIDE 1000 MG: 1 INJECTION, SOLUTION INTRAVENOUS at 10:37

## 2024-08-19 RX ADMIN — ZOLPIDEM TARTRATE 10 MG: 5 TABLET, COATED ORAL at 21:02

## 2024-08-19 RX ADMIN — MIDAZOLAM 0.5 MG: 1 INJECTION INTRAMUSCULAR; INTRAVENOUS at 08:46

## 2024-08-19 RX ADMIN — METOPROLOL SUCCINATE 75 MG: 50 TABLET, EXTENDED RELEASE ORAL at 05:19

## 2024-08-19 RX ADMIN — FLECAINIDE ACETATE 150 MG: 50 TABLET ORAL at 08:08

## 2024-08-19 RX ADMIN — ACETAMINOPHEN 650 MG: 325 TABLET, FILM COATED ORAL at 18:15

## 2024-08-19 RX ADMIN — INSULIN GLARGINE 10 UNITS: 100 INJECTION, SOLUTION SUBCUTANEOUS at 21:02

## 2024-08-19 RX ADMIN — MIDAZOLAM 1 MG: 1 INJECTION INTRAMUSCULAR; INTRAVENOUS at 08:37

## 2024-08-19 RX ADMIN — Medication 10 ML: at 08:51

## 2024-08-19 RX ADMIN — LORATADINE 10 MG: 10 TABLET ORAL at 08:09

## 2024-08-19 RX ADMIN — OXYCODONE HYDROCHLORIDE AND ACETAMINOPHEN 500 MG: 500 TABLET ORAL at 08:08

## 2024-08-19 RX ADMIN — ROPINIROLE HYDROCHLORIDE 2 MG: 1 TABLET, FILM COATED ORAL at 21:02

## 2024-08-19 RX ADMIN — APIXABAN 5 MG: 5 TABLET, FILM COATED ORAL at 21:00

## 2024-08-19 RX ADMIN — EZETIMIBE 10 MG: 10 TABLET ORAL at 21:02

## 2024-08-19 RX ADMIN — FENTANYL CITRATE 25 MCG: 50 INJECTION INTRAMUSCULAR; INTRAVENOUS at 08:46

## 2024-08-19 RX ADMIN — Medication 8 ML: at 08:49

## 2024-08-19 RX ADMIN — FLECAINIDE ACETATE 150 MG: 50 TABLET ORAL at 18:14

## 2024-08-19 RX ADMIN — FENTANYL CITRATE 25 MCG: 50 INJECTION INTRAMUSCULAR; INTRAVENOUS at 08:37

## 2024-08-19 RX ADMIN — BUMETANIDE 2 MG: 1 TABLET ORAL at 10:37

## 2024-08-19 RX ADMIN — LEVALBUTEROL HYDROCHLORIDE 1.25 MG: 1.25 SOLUTION RESPIRATORY (INHALATION) at 07:50

## 2024-08-19 RX ADMIN — Medication 1000 UNITS: at 08:08

## 2024-08-19 RX ADMIN — DILTIAZEM HYDROCHLORIDE 120 MG: 120 CAPSULE, COATED, EXTENDED RELEASE ORAL at 08:08

## 2024-08-19 RX ADMIN — FAMOTIDINE 20 MG: 20 TABLET, FILM COATED ORAL at 18:15

## 2024-08-19 RX ADMIN — GUAIFENESIN AND DEXTROMETHORPHAN 10 ML: 100; 10 SYRUP ORAL at 15:11

## 2024-08-19 RX ADMIN — GABAPENTIN 300 MG: 300 CAPSULE ORAL at 21:02

## 2024-08-19 NOTE — PLAN OF CARE

## 2024-08-19 NOTE — PLAN OF CARE
Problem: PAIN - ADULT  Goal: Verbalizes/displays adequate comfort level or baseline comfort level  Description: Interventions:  - Encourage patient to monitor pain and request assistance  - Assess pain using appropriate pain scale  - Administer analgesics based on type and severity of pain and evaluate response  - Implement non-pharmacological measures as appropriate and evaluate response  - Consider cultural and social influences on pain and pain management  - Notify physician/advanced practitioner if interventions unsuccessful or patient reports new pain  Outcome: Progressing     Problem: INFECTION - ADULT  Goal: Absence or prevention of progression during hospitalization  Description: INTERVENTIONS:  - Assess and monitor for signs and symptoms of infection  - Monitor lab/diagnostic results  - Monitor all insertion sites, i.e. indwelling lines, tubes, and drains  - Monitor endotracheal if appropriate and nasal secretions for changes in amount and color  - [place] appropriate cooling/warming therapies per order  - Administer medications as ordered  - Instruct and encourage patient and family to use good hand hygiene technique  - Identify and instruct in appropriate isolation precautions for identified infection/condition  Outcome: Progressing

## 2024-08-19 NOTE — SEDATION DOCUMENTATION
Procedure ended. Asept catheter placed by Dr. Romero. Dressing to site. 1050 ml yellow fluid removed. Report and care assumed by primary RN

## 2024-08-19 NOTE — ASSESSMENT & PLAN NOTE
Thoracocentesis performed, 1300 mL clear yellow fluid drained from right pleural space.   Plan for IR procedure on 8/19 for pleural catheter placement prior to discharge    Colchicine Counseling:  Patient counseled regarding adverse effects including but not limited to stomach upset (nausea, vomiting, stomach pain, or diarrhea).  Patient instructed to limit alcohol consumption while taking this medication.  Colchicine may reduce blood counts especially with prolonged use.  The patient understands that monitoring of kidney function and blood counts may be required, especially at baseline. The patient verbalized understanding of the proper use and possible adverse effects of colchicine.  All of the patient's questions and concerns were addressed.

## 2024-08-19 NOTE — RESPIRATORY THERAPY NOTE
RT Protocol Note  Farnaz Martin 79 y.o. female MRN: [id number]  Unit/Bed#: S -01 Encounter: [id number]    Assessment    Principal Problem:    Acute on chronic heart failure (HCC)  Active Problems:    Goals of care, counseling/discussion    Elevated serum creatinine    Cough    Paroxysmal A-fib (HCC)    Recurrent pleural effusion on right    Palliative care patient      Home Pulmonary Medications:  Albuterol MDI, Spriva inhaler       Past Medical History:   Diagnosis Date    Actinic keratosis     last assessed - 06Jun2014    Arthritis     Atrial fibrillation with rapid ventricular response (HCC)     last assessed - 26Apr2016    Atrial fibrillation with rapid ventricular response (HCC) 04/19/2016    Basal cell carcinoma     Benign colon polyp     last assessed - 27Apr2015    Bronchiectasis without complication (HCC)     CHF (congestive heart failure) (HCC) 06/2022    Chronic diastolic CHF (congestive heart failure) (HCC)     Disease of thyroid gland     Effusion of knee joint right     Resolved - 19Apr2016    Esophageal reflux     Fibromyalgia     last assessed - [id number]    Fibromyalgia, primary     GERD (gastroesophageal reflux disease)     Hyperlipidemia     Hypertension     Hyponatremia     Malignant neoplasm of thyroid gland (HCC)     Meningioma (HCC)     MGUS (monoclonal gammopathy of unknown significance)     Palpitations     last assessed - 30Apr2013    Peroneal tendonitis, right     last assessed - [id number]    Right lumbar radiculopathy 03/17/2016    Thyroid cancer (HCC)     Thyroid nodule     Trochanteric bursitis of left hip 03/09/2018     Social History     Socioeconomic History    Marital status:      Spouse name: None    Number of children: None    Years of education: None    Highest education level: None   Occupational History    None   Tobacco Use    Smoking status: Never    Smokeless tobacco: Never   Vaping Use    Vaping status: Never Used   Substance and Sexual Activity    Alcohol  use: Not Currently    Drug use: Never    Sexual activity: Not Currently   Other Topics Concern    None   Social History Narrative    ** Merged History Encounter **          Social Determinants of Health     Financial Resource Strain: Patient Unable To Answer (12/19/2023)    Overall Financial Resource Strain (CARDIA)     Difficulty of Paying Living Expenses: Patient unable to answer   Food Insecurity: No Food Insecurity (7/25/2024)    Hunger Vital Sign     Worried About Running Out of Food in the Last Year: Never true     Ran Out of Food in the Last Year: Never true   Transportation Needs: No Transportation Needs (7/25/2024)    PRAPARE - Transportation     Lack of Transportation (Medical): No     Lack of Transportation (Non-Medical): No   Physical Activity: Not on file   Stress: Not on file   Social Connections: Not on file   Intimate Partner Violence: Not At Risk (7/31/2023)    [facility]    Humiliation, Afraid, Rape, and Kick questionnaire     Fear of Current or Ex-Partner: No     Emotionally Abused: No     Physically Abused: No     Sexually Abused: No   Housing Stability: Low Risk  (7/25/2024)    Housing Stability Vital Sign     Unable to Pay for Housing in the Last Year: No     Number of Times Moved in the Last Year: 0     Homeless in the Last Year: No       Subjective         Objective    Physical Exam:   Assessment Type: During-treatment  General Appearance: Alert, Awake  Respiratory Pattern: Normal  Chest Assessment: Chest expansion symmetrical  Bilateral Breath Sounds: Diminished, Coarse    Vitals:  Blood pressure 133/76, pulse 59, temperature 97.6 °F (36.4 °C), resp. rate 18, weight 70.2 kg (154 lb 12.2 oz), last menstrual period 02/01/1990, SpO2 100%, not currently breastfeeding.          Imaging and other studies:           Plan             Resp Comments: Patient with strong unproductive cough, will give flutter for AC. Patient recieved on 2L,  weaned to 1L patient does not wear oxygen at home.

## 2024-08-19 NOTE — ASSESSMENT & PLAN NOTE
Afib-  S/p ablation in 03/2021.   EKG- AV dual-paced rhythm, Vent. rate has decreased by 14 BPM since prior EKG  Low voltage QRS    Plan:  Eliquis 5 mg twice daily, hold night and morning dose prior to pleural catheter placement  Continue flecainide 150 mg twice daily and metoprolol succinate 75 mg twice daily  Continue diltiazem 120 mg daily

## 2024-08-19 NOTE — PROGRESS NOTES
[facility]  Progress Note  Name: Farnaz Martin I  MRN: [id number]  Unit/Bed#: S -01 I Date of Admission: 8/6/2024   Date of Service: 8/19/2024 I Hospital Day: 13    Assessment & Plan   * Acute on chronic heart failure (HCC)  Assessment & Plan    Lab Results   Component Value Date    LVEF 50 07/14/2024    BNP 1,091 (H) 08/06/2024    BNP 1,428 (H) 07/24/2024     Acute and chronic respiratory failure with hypoxia 2/2 CHF exacerbation a/e/b hypoxia, increased oxygen demands requiring CXR, Nebs, PO bumex and 4L NC oxygen.    Presents with increased shortness of breath, dyspnea on exertion, lower extremity edema, and cough with productive sputum  Patient is currently volume overloaded.  Initial hx: 3 to 4-day history of progressively worsening dyspnea, dry cough, worsening lower extremity edema.  She required increased oxygen when EMS arrived. difficulty talking normal sentences secondary to shortness of breath.  She is now unable to lie flat.  She was discharged on recent admission with 10 mg torsemide daily.  Clinical suspicion is under diuresis leading to volume overload.  Findings: ED provider: She is at baseline on 2 L O2 supplemental O2 and, because of hypoxia to the 80s, was bumped up to 4.  On arrival 97% on 4L (baseline chronic 2L NC)  Currently pt is on room air saturating appopriately for her, diuresed approximately 5.6L of fluid since admission, improving SOB   Was seen by heart failure team 8/12    Serial cxr shows stable pleural effusion   Most recent Echo 50% LVEF     Weight (last 2 days)       Date/Time Weight    08/18/24 0600 70.9 (156.4)    08/17/24 0600 72.3 (159.39)          I/O: - 5.6 L since admission    Plan:  Per cardiology: PO Bumex 2 mg daily   B-Blocker: Toprol  Cardiac diet, sodium restriction  CMP, magnesium a.m; Goal Mg > 2 and K > 4; Replete prn  Daily standing weights, Measure I/O  Thoracocentesis performed, 1300 mL clear yellow fluid drained from right  pleural space.   Discussed with cardiology and appropriate maintenance diuretic dose that is adequate for discharge.          Elevated serum creatinine  Assessment & Plan    Recent Labs     08/16/24  0542 08/17/24  0510 08/18/24  0604   CREATININE 1.16 1.10 1.02   EGFR 44 47 52     Estimated Creatinine Clearance: 40.6 mL/min (by C-G formula based on SCr of 1.02 mg/dL).    Elevated creatinine, continue to monitor    Plan:  PO Bumex 2mg daily  Monitor BMP  Avoid hypoperfusion of the kidneys, minimize nephrotoxins  Currently at baseline, continue to monitor with daily labs    Cough  Assessment & Plan  Pt endorsing orthopnea, SOB  Productive in nature without production of sputum  Uses Spiriva inhaler and Robitussin at home for sx's  R/p chest x-ray shows increased congestion, persistent pleural effusion that is worsening on chest x-rays  Cough component likely CHF exacerbation  Pulmonology was consulted, recommended no procedural interventions at this time, but recommended   -Continue XOPENEX inhalation solution 1.25 mg   -Continue home inhalers and nebulizer treatment on discharge.   -Possible home oxygen evaluation   - s/p thoracentesis 8/13     Plan:  Atrovent neb four times daily  XOPENEX inhalation solution 1.25 mg   Continue home inhalers and nebulizer treatment on discharge.   Possible home oxygen evaluation   Thoracocentesis performed, 1300 mL clear yellow fluid drained from right pleural space.   Monitor oxygen status today, wean as tolerated  Plan for IR procedure on 8/19 for pleural catheter placement prior to discharge      Paroxysmal A-fib (HCC)  Assessment & Plan  Afib-  S/p ablation in 03/2021.   EKG- AV dual-paced rhythm, Vent. rate has decreased by 14 BPM since prior EKG  Low voltage QRS    Plan:  Eliquis 5 mg twice daily, hold night and morning dose prior to pleural catheter placement  Continue flecainide 150 mg twice daily and metoprolol succinate 75 mg twice daily  Continue diltiazem 120 mg  daily    Palliative care patient  Assessment & Plan  Pt was consulted by palliative care and daughter was present in room as POA, discussed hospice topic as her condition is a stepwise progression.  Continued discussion with goals of care going forward as pt's prognosis progresses.   As of 8/17 planning for home hospice following Asept catheter placement on 8/19.     Recurrent pleural effusion on right  Assessment & Plan  Thoracocentesis performed, 1300 mL clear yellow fluid drained from right pleural space.   Plan for IR procedure on 8/19 for pleural catheter placement prior to discharge     Goals of care, counseling/discussion  Assessment & Plan  Discussion with home hospice, daughter will discuss with patient.  Planning for home hospice following Asept catheter placement 8/19.              VTE Pharmacologic Prophylaxis: VTE Score: 5 High Risk (Score >/= 5) - Pharmacological DVT Prophylaxis Ordered: apixaban (Eliquis). Sequential Compression Devices Ordered.    Mobility:   Basic Mobility Inpatient Raw Score: 23  JH-HLM Goal: 7: Walk 25 feet or more  JH-HLM Achieved: 7: Walk 25 feet or more  JH-HLM Goal achieved. Continue to encourage appropriate mobility.    Patient Centered Rounds: I performed bedside rounds with nursing staff today.   Discussions with Specialists or Other Care Team Provider: Interventional radiology, pulmonology, cardiology    Education and Discussions with Family / Patient: Updated  (daughter) via phone.    Total Time Spent on Date of Encounter in care of patient: 60 mins. This time was spent on one or more of the following: performing physical exam; counseling and coordination of care; obtaining or reviewing history; documenting in the medical record; reviewing/ordering tests, medications or procedures; communicating with other healthcare professionals and discussing with patient's family/caregivers.    Current Length of Stay: 13 day(s)  Current Patient Status: Inpatient    Certification Statement: The patient will continue to require additional inpatient hospital stay due to pleural catheter monitoring  Discharge Plan: Anticipate discharge tomorrow to rehab facility.    Code Status: Level 3 - DNAR and DNI    Subjective:   Evaluate patient at bedside, no acute events occurred overnight.  She was resting comfortably in bed and reports less shortness of breath and minimal coughing compared to baseline.  She endorses eagerness to go through with this procedure and be discharged.  She has no other concerns or complaints, all questions and concerns were answered at bedside.    Objective:     Vitals:   Temp (24hrs), Av.3 °F (36.8 °C), Min:97.6 °F (36.4 °C), Max:98.7 °F (37.1 °C)    Temp:  [97.6 °F (36.4 °C)-98.7 °F (37.1 °C)] 97.6 °F (36.4 °C)  HR:  [59-97] 64  Resp:  [16-18] 18  BP: ()/(42-94) 90/52  SpO2:  [94 %-100 %] 96 %  Body mass index is 28.31 kg/m².     Input and Output Summary (last 24 hours):     Intake/Output Summary (Last 24 hours) at 2024 1122  Last data filed at 2024 1037  Gross per 24 hour   Intake 420 ml   Output 1750 ml   Net -1330 ml       Physical Exam:    Constitutional:       General: She is not in acute distress.     Appearance: She is not toxic-appearing.   HENT:      Head: Normocephalic and atraumatic.      Nose: Nose normal.      Mouth/Throat:      Pharynx: Oropharynx is clear.   Eyes:      Conjunctiva/sclera: Conjunctivae normal.   Cardiovascular:      Rate and Rhythm: Normal rate and regular rhythm.   Pulmonary:      Effort: Pulmonary effort is normal.      Breath sounds: No wheezing, rhonchi or rales.      Comments: Decreased breath sounds R lower lung fields  Abdominal:      Palpations: Abdomen is soft.      Tenderness: There is no abdominal tenderness. There is no guarding.   Musculoskeletal:      Right lower leg: No edema.      Left lower leg: No edema.   Skin:     General: Skin is warm and dry.   Neurological:      Mental Status: She is  alert and oriented to person, place, and time. Mental status is at baseline.   Psychiatric:         Mood and Affect: Mood normal.         Behavior: Behavior normal.         Thought Content: Thought content normal.         Judgment: Judgment normal.     Additional Data:     Labs:  Results from last 7 days   Lab Units 08/19/24  0613   WBC Thousand/uL 6.52   HEMOGLOBIN g/dL 12.0   HEMATOCRIT % 38.7   PLATELETS Thousands/uL 265   SEGS PCT % 51   LYMPHO PCT % 31   MONO PCT % 12   EOS PCT % 5     Results from last 7 days   Lab Units 08/19/24  0613 08/18/24  0604 08/17/24  0510   SODIUM mmol/L 137   < > 132*   POTASSIUM mmol/L 4.2   < > 3.7   CHLORIDE mmol/L 95*   < > 97   CO2 mmol/L 37*   < > 29   BUN mg/dL 27*   < > 31*   CREATININE mg/dL 1.20   < > 1.10   ANION GAP mmol/L 5   < > 6   CALCIUM mg/dL 9.4   < > 8.7   ALBUMIN g/dL  --   --  3.4*   TOTAL BILIRUBIN mg/dL  --   --  0.89   ALK PHOS U/L  --   --  72   ALT U/L  --   --  25   AST U/L  --   --  25   GLUCOSE RANDOM mg/dL 102   < > 80    < > = values in this interval not displayed.     Results from last 7 days   Lab Units 08/19/24  0613   INR  1.30*     Results from last 7 days   Lab Units 08/19/24  1117 08/19/24  0759 08/18/24  2101 08/18/24  1701 08/18/24  1241 08/18/24  1047 08/18/24  0733 08/17/24  2055 08/17/24  1548 08/17/24  1137 08/17/24  0759 08/16/24  2052   POC GLUCOSE mg/dl 113 79 244* 148* 105 165* 65 192* 178* 124 83 187*               Lines/Drains:  Invasive Devices       Peripheral Intravenous Line  Duration             Peripheral IV 08/17/24 Distal;Dorsal (posterior);Left Forearm 2 days              Drain  Duration             Pleural Effusion Long-Term Catheter 16 Fr. <1 day                          Imaging: Reviewed radiology reports from this admission including: chest xray    Recent Cultures (last 7 days):   Results from last 7 days   Lab Units 08/13/24  1259   GRAM STAIN RESULT  1+ Polys  No bacteria seen   BODY FLUID CULTURE, STERILE  No growth        Last 24 Hours Medication List:   Current Facility-Administered Medications   Medication Dose Route Frequency Provider Last Rate    acetaminophen  650 mg Oral Q8H PRN Steve Tripathi MD      albuterol  2 puff Inhalation Q6H PRN Steve Tripathi MD      apixaban  5 mg Oral Once Steve Tripathi MD      [START ON 8/20/2024] apixaban  5 mg Oral BID Steve Tripahti MD      ascorbic acid  500 mg Oral Daily Steve Tripathi MD      benzonatate  100 mg Oral TID PRN Steve Tripathi MD      bumetanide  2 mg Oral Daily Steve Tripathi MD      Cholecalciferol  1,000 Units Oral Daily Steve Tripathi MD      dextromethorphan-guaiFENesin  10 mL Oral Q4H PRN Odin Castro MD      diltiazem  120 mg Oral Daily Steve Tripathi MD      ezetimibe  10 mg Oral HS Steve Tripathi MD      famotidine  20 mg Oral BID Steve Tripathi MD      flecainide  150 mg Oral BID Steve Tripathi MD      gabapentin  300 mg Oral HS Steve Tripathi MD      insulin glargine  10 Units Subcutaneous HS Steve Tripathi MD      insulin lispro  1-5 Units Subcutaneous TID AC Steve Tripathi MD      loratadine  10 mg Oral Daily Steve Tripathi MD      metoprolol succinate  75 mg Oral Q12H Steve Tripathi MD      nystatin   Topical BID PRN Mya Quinn MD      rOPINIRole  2 mg Oral HS Steve Tripathi MD      vancomycin  1,000 mg Intravenous Once Xavi Torrez MD 1,000 mg (08/19/24 1037)    zolpidem  10 mg Oral HS Steve Tripathi MD          Today, Patient Was Seen By: Steve Tripathi MD    **Please Note: This note may have been constructed using a voice recognition system.**

## 2024-08-19 NOTE — PROGRESS NOTES
Interventional Radiology Preprocedure Note    History/Indication for procedure:   Farnaz Martin is a 79 y.o. female with a PMH of recurrent right pleural effusion who presents for tunneled right pleural catheter placement.    Relevant past medical history:    Past Medical History:   Diagnosis Date    Actinic keratosis     last assessed - 06Jun2014    Arthritis     Atrial fibrillation with rapid ventricular response (HCC)     last assessed - 26Apr2016    Atrial fibrillation with rapid ventricular response (HCC) 04/19/2016    Basal cell carcinoma     Benign colon polyp     last assessed - 27Apr2015    Bronchiectasis without complication (HCC)     CHF (congestive heart failure) (HCC) 06/2022    Chronic diastolic CHF (congestive heart failure) (HCC)     Disease of thyroid gland     Effusion of knee joint right     Resolved - 19Apr2016    Esophageal reflux     Fibromyalgia     last assessed - 27Dec2017    Fibromyalgia, primary     GERD (gastroesophageal reflux disease)     Hyperlipidemia     Hypertension     Hyponatremia     Malignant neoplasm of thyroid gland (HCC)     Meningioma (HCC)     MGUS (monoclonal gammopathy of unknown significance)     Palpitations     last assessed - 30Apr2013    Peroneal tendonitis, right     last assessed - 02Oct2013    Right lumbar radiculopathy 03/17/2016    Thyroid cancer (HCC)     Thyroid nodule     Trochanteric bursitis of left hip 03/09/2018     Patient Active Problem List   Diagnosis    Essential hypertension    Allergic rhinitis    Fibromyalgia    Hyperlipidemia    Insomnia    Lumbar spondylosis    Lumbar stenosis    Osteoarthrosis, hand    Osteoarthritis of knee    Osteopenia    Peripheral neuropathy    Restless legs syndrome    TMJ syndrome    Vitamin D deficiency    Osteoarthritis of shoulder    Carpal tunnel syndrome on right    Arthritis of both acromioclavicular joints    Chronic rhinitis    Goals of care, counseling/discussion    Malignant neoplasm of thyroid gland (HCC)     Current use of long term anticoagulation    S/P placement of cardiac pacemaker    Tremors of nervous system    Hx of diplopia    Hurthle cell adenoma of thyroid    MGUS (monoclonal gammopathy of unknown significance)    Vasomotor rhinitis    Chronic tension-type headache, not intractable    Meningioma ([facility])    Acute on chronic heart failure (HCC)    Elevated serum creatinine    Nonrheumatic mitral valve regurgitation    Abnormal CT of the chest    S/P revision of total knee, left    Multiple thyroid nodules    Cough    Bronchiectasis without complication ([facility])    Left renal mass    Ambulatory dysfunction    Hyponatremia    Chronic heart failure with preserved ejection fraction (HCC)    Paroxysmal A-fib ([facility])    History of sick sinus syndrome s/p PPM    Type 2 diabetes mellitus with microalbuminuria, without long-term current use of insulin ([facility])    Syncope and collapse    Fall    Recurrent pleural effusion on right    Acute kidney injury superimposed on CKD (chronic kidney disease) stage 3, GFR 30-59 ml/min ([facility])    Fatigue    Thyroid nodule    Shortness of breath    Palliative care patient       /76   Pulse 59   Temp 97.6 °F (36.4 °C)   Resp 18   Wt 70.2 kg (154 lb 12.2 oz)   LMP 02/01/1990 (Within Weeks)   SpO2 98%   BMI 28.31 kg/m²     Medications:    Inpatient Medications:     Scheduled Medications:  Current Facility-Administered Medications   Medication Dose Route Frequency Provider Last Rate    acetaminophen  650 mg Oral Q8H PRN Steve Tripathi MD      albuterol  2 puff Inhalation Q6H PRN Steve Tripathi MD      apixaban  5 mg Oral Once Steve Tripathi MD      [START ON 8/20/2024] apixaban  5 mg Oral BID Steve Tripathi MD      ascorbic acid  500 mg Oral Daily Steve Tripathi MD      benzonatate  100 mg Oral TID PRN Stvee Tripathi MD      bumetanide  2 mg Oral Daily Steve Tripathi MD      Cholecalciferol  1,000 Units Oral Daily Steve Tripathi MD      dextromethorphan-guaiFENesin  10 mL Oral Q4H PRN  Odin Castro MD      diltiazem  120 mg Oral Daily Steve Tripathi MD      ezetimibe  10 mg Oral HS Steve Tripathi MD      famotidine  20 mg Oral BID Steve Tripathi MD      fentaNYL   Intravenous PRN Kwabena Romero MD      flecainide  150 mg Oral BID Steve Tripathi MD      gabapentin  300 mg Oral HS Steve Tripathi MD      insulin glargine  10 Units Subcutaneous HS Steve Tripathi MD      insulin lispro  1-5 Units Subcutaneous TID AC Steve Tripathi MD      loratadine  10 mg Oral Daily Steve Tripathi MD      metoprolol succinate  75 mg Oral Q12H Steve Tripathi MD      midazolam    PRN Kwabena Romero MD      nystatin   Topical BID PRN Mya Quinn MD      rOPINIRole  2 mg Oral HS Steve Tripathi MD      zolpidem  10 mg Oral HS Steve Tripathi MD         Infusions:       PRN:    acetaminophen    albuterol    benzonatate    dextromethorphan-guaiFENesin    fentaNYL    midazolam    nystatin    Outpatient Medications:  No current facility-administered medications on file prior to encounter.     Current Outpatient Medications on File Prior to Encounter   Medication Sig Dispense Refill    albuterol (2.5 mg/3 mL) 0.083 % nebulizer solution Take 3 mL (2.5 mg total) by nebulization 4 (four) times a day      albuterol (ProAir HFA) 90 mcg/act inhaler Inhale 2 puffs every 6 (six) hours as needed for wheezing 8.5 g 3    apixaban (ELIQUIS) 5 mg Take 1 tablet (5 mg total) by mouth 2 (two) times a day 60 tablet 3    ascorbic Acid (VITAMIN C) 500 MG CPCR Take 500 mg by mouth daily      benzonatate (TESSALON PERLES) 100 mg capsule Take 1 capsule (100 mg total) by mouth 3 (three) times a day as needed for cough 20 capsule 0    Cetirizine HCl (ZyrTEC Allergy) 10 MG CAPS Take 10 mg by mouth in the morning      Cholecalciferol 50 MCG (2000 UT) CAPS Take 2,000 Units by mouth 2 (two) times a day      dapagliflozin 5 MG TABS Take 1 tablet (5 mg total) by mouth daily 30 tablet 5    diltiazem (CARDIZEM CD) 120 mg 24 hr capsule Take 120 mg by mouth  daily      ezetimibe (ZETIA) 10 mg tablet Take 1 tablet (10 mg total) by mouth daily at bedtime 30 tablet 0    famotidine (PEPCID) 20 mg tablet Take 20 mg by mouth 2 (two) times a day M, W, F in the AM      flecainide (TAMBOCOR) 150 MG tablet Take 1 tablet (150 mg total) by mouth 2 (two) times a day 30 tablet 0    gabapentin (NEURONTIN) 300 mg capsule take 1 capsule by mouth at bedtime 90 capsule 1    glucose blood (OneTouch Verio) test strip Check blood sugars once daily. Please substitute with appropriate alternative as covered by patient's insurance. Dx: E11.65 100 each 3    Insulin Glargine Solostar (Lantus SoloStar) 100 UNIT/ML SOPN Inject 0.1 mL (10 Units total) under the skin daily at bedtime 15 mL 1    Insulin Pen Needle 31G X 4 MM MISC Use daily at bedtime 100 each 2    ipratropium (ATROVENT) 0.03 % nasal spray 2 sprays into each nostril 3 (three) times a day Begin once per day, then progress to twice per day. Progress to three times a day as tolerated. (Patient taking differently: 2 sprays into each nostril if needed Begin once per day, then progress to twice per day. Progress to three times a day as tolerated.) 90 mL 3    metoprolol succinate (TOPROL-XL) 25 mg 24 hr tablet take 3 tablets by mouth every 12 hours 540 tablet 2    OneTouch Delica Lancets 33G MISC Check blood sugars once daily. Please substitute with appropriate alternative as covered by patient's insurance. Dx: E11.65 100 each 3    rOPINIRole (REQUIP) 1 mg tablet Take 2 tablets (2 mg total) by mouth daily at bedtime 180 tablet 1    tiotropium (Spiriva Respimat) 2.5 MCG/ACT AERS inhaler Inhale 2 puffs daily 4 g 2    torsemide (DEMADEX) 10 mg tablet Take 1 tablet (10 mg total) by mouth daily 30 tablet 0    zolpidem (AMBIEN) 10 mg tablet Take 1 tablet (10 mg total) by mouth daily at bedtime 14 tablet 0    TURMERIC PO Take 1 capsule by mouth 2 (two) times a day (Patient not taking: Reported on 8/6/2024)         Allergies   Allergen Reactions     Sotalol Other (See Comments)     Prolonged QTc leading to torsades de pointes     Penicillins Other (See Comments)     As a child calcium deposit in the arm     Ace Inhibitors GI Intolerance     Did feel well on it    Omeprazole Abdominal Pain, Rash and Vomiting     stomach pain, vomiting, rash       Anticoagulants: eliquis    ASA classification: ASA 3 - Patient with moderate systemic disease with functional limitations    Airway Assessment: II (hard and soft palate, upper portion of tonsils anduvula visible)    Relevant family history: None    Relevant review of systems: None    Prior sedation/anesthesia: yes    Can the patient lie flat? Yes     NPO Status: yes    Labs:   CBC with diff:   Lab Results   Component Value Date    WBC 6.52 08/19/2024    HGB 12.0 08/19/2024    HCT 38.7 08/19/2024    MCV 90 08/19/2024     08/19/2024    RBC 4.32 08/19/2024    MCH 27.8 08/19/2024    MCHC 31.0 (L) 08/19/2024    RDW 14.6 08/19/2024    MPV 10.7 08/19/2024    NRBC 0 08/19/2024     BMP/CMP:  Lab Results   Component Value Date     05/06/2015    K 4.2 08/19/2024    K 4.4 08/01/2024    CL 95 (L) 08/19/2024     08/01/2024    CO2 37 (H) 08/19/2024    CO2 23 08/01/2024    ANIONGAP 9 05/06/2015    BUN 27 (H) 08/19/2024    BUN 31 (H) 08/01/2024    CREATININE 1.20 08/19/2024    CREATININE 1.25 (H) 08/01/2024    GLUCOSE 148 (H) 07/13/2024    GLUCOSE 114 05/06/2015    CALCIUM 9.4 08/19/2024    CALCIUM 8.7 08/01/2024    AST 25 08/17/2024    AST 28 05/06/2015    ALT 25 08/17/2024    ALT 34 05/06/2015    ALKPHOS 72 08/17/2024    ALKPHOS 60 05/06/2015    PROT 7.0 05/06/2015    BILITOT 0.78 05/06/2015    EGFR 42 08/19/2024    EGFR 44 (L) 08/01/2024   ,     Coags:   Lab Results   Component Value Date    PT 16.3 (H) 08/02/2022    PTT 32 05/24/2024    INR 1.30 (H) 08/19/2024    INR 1.4 (H) 08/02/2022   ,   Results from last 7 days   Lab Units 08/19/24  0613   INR  1.30*        Relevant imaging studies:   Reviewed.    Directed  physical examination:  I agree with the physical exam performed on 8/18/24 and there are no additional changes.    Assessment/Plan:   Right tunneled pleural catheter placement.    Sedation/Anesthesia plan:  Moderate sedation will be used as needed for procedure.    Consent with alternatives to the procedure, risks and benefits have been explained and discussed with the patient/patient's family: yes.

## 2024-08-19 NOTE — ASSESSMENT & PLAN NOTE
Recent Labs     08/16/24  0542 08/17/24  0510 08/18/24  0604   CREATININE 1.16 1.10 1.02   EGFR 44 47 52     Estimated Creatinine Clearance: 40.6 mL/min (by C-G formula based on SCr of 1.02 mg/dL).    Elevated creatinine, continue to monitor    Plan:  PO Bumex 2mg daily  Monitor BMP  Avoid hypoperfusion of the kidneys, minimize nephrotoxins  Currently at baseline, continue to monitor with daily labs

## 2024-08-19 NOTE — RESPIRATORY THERAPY NOTE
Home Oxygen Qualifying Test     Patient name: Farnaz Martin        : 1945   Date of Test:  2024  Diagnosis:    Home Oxygen Test:    **Medicare Guidelines require item(s) 1-5 on all ambulatory patients or 1 and 2 on non-ambulatory patients.    1. Baseline SPO2 on Room Air at rest 95 %   If <= 88% on Room Air add O2 via NC to obtain SpO2 >=88%. If LPM needed, document LPM NA needed to reach =>88%    SPO2 during exertion on Room Air 79  %  During exertion monitor SPO2. If SPO2 increases >=89%, do not add supplemental oxygen    SPO2 on Oxygen at Rest NA % at NA LPM    SPO2 during exertion on Oxygen 92 % at 4 LPM    Test performed during exertion activity.      [x]  Supplemental Home Oxygen is indicated.    []  Client does not qualify for home oxygen.    Respiratory Additional Notes- Patient unable to ambulate for full 6 minutes. She was able to do 3 laps around room using a walker. Patient did mention she is going to rehab post leaving the hospital. Patient at rest does not qualify for home oxygen. At ambulation patient requires 4L NC. Results rover messaged to ordering provider Steve Tripathi and  Shaniqua naylor.    Tiny Archer, RT

## 2024-08-19 NOTE — ASSESSMENT & PLAN NOTE
Pt endorsing orthopnea, SOB  Productive in nature without production of sputum  Uses Spiriva inhaler and Robitussin at home for sx's  R/p chest x-ray shows increased congestion, persistent pleural effusion that is worsening on chest x-rays  Cough component likely CHF exacerbation  Pulmonology was consulted, recommended no procedural interventions at this time, but recommended   -Continue XOPENEX inhalation solution 1.25 mg   -Continue home inhalers and nebulizer treatment on discharge.   -Possible home oxygen evaluation   - s/p thoracentesis 8/13     Plan:  Atrovent neb four times daily  XOPENEX inhalation solution 1.25 mg   Continue home inhalers and nebulizer treatment on discharge.   Possible home oxygen evaluation   Thoracocentesis performed, 1300 mL clear yellow fluid drained from right pleural space.   Monitor oxygen status today, wean as tolerated  Plan for IR procedure on 8/19 for pleural catheter placement prior to discharge

## 2024-08-19 NOTE — QUICK NOTE
8/19/2024 9:21 AM -  Farnaz Martin's chart and case were reviewed by Peter Solomon DO.  Mode of review included electronic chart check.    Per review, symptoms remain controlled on current regimen and no changes are made at this time.  Please continue the regimen in place, and review our last note for details.  For dispo plan, please review Case Management notes.     Patient at IR this morning for Asept cather placement.   Case discussed with CM, patient's goal is to pursue SNF placement and not hospice at time of discharge.  Goals clear at this time.  Palliative to follow as needed.      For urgent issues or any questions/concerns, please notify on-call provider via [employer] Secure Chat.  You may also call our answering service 24/7 at [phone].    Peter Solomon DO  Palliative and Supportive Care  Clinic/Answering Service: [phone]  You can find me on [employer] Chat!

## 2024-08-19 NOTE — ASSESSMENT & PLAN NOTE
Lab Results   Component Value Date    LVEF 50 07/14/2024    BNP 1,091 (H) 08/06/2024    BNP 1,428 (H) 07/24/2024     Acute and chronic respiratory failure with hypoxia 2/2 CHF exacerbation a/e/b hypoxia, increased oxygen demands requiring CXR, Nebs, PO bumex and 4L NC oxygen.    Presents with increased shortness of breath, dyspnea on exertion, lower extremity edema, and cough with productive sputum  Patient is currently volume overloaded.  Initial hx: 3 to 4-day history of progressively worsening dyspnea, dry cough, worsening lower extremity edema.  She required increased oxygen when EMS arrived. difficulty talking normal sentences secondary to shortness of breath.  She is now unable to lie flat.  She was discharged on recent admission with 10 mg torsemide daily.  Clinical suspicion is under diuresis leading to volume overload.  Findings: ED provider: She is at baseline on 2 L O2 supplemental O2 and, because of hypoxia to the 80s, was bumped up to 4.  On arrival 97% on 4L (baseline chronic 2L NC)  Currently pt is on room air saturating appopriately for her, diuresed approximately 5.6L of fluid since admission, improving SOB   Was seen by heart failure team 8/12    Serial cxr shows stable pleural effusion   Most recent Echo 50% LVEF     Weight (last 2 days)       Date/Time Weight    08/18/24 0600 70.9 (156.4)    08/17/24 0600 72.3 (159.39)          I/O: - 5.6 L since admission    Plan:  Per cardiology: PO Bumex 2 mg daily   B-Blocker: Toprol  Cardiac diet, sodium restriction  CMP, magnesium a.m; Goal Mg > 2 and K > 4; Replete prn  Daily standing weights, Measure I/O  Thoracocentesis performed, 1300 mL clear yellow fluid drained from right pleural space.   Discussed with cardiology and appropriate maintenance diuretic dose that is adequate for discharge.

## 2024-08-19 NOTE — BRIEF OP NOTE (RAD/CATH)
INTERVENTIONAL RADIOLOGY PROCEDURE NOTE    Date: 8/19/2024    Procedure:   Right tunneled pleural catheter placement      Preoperative diagnosis:   1. Acute exacerbation of CHF (congestive heart failure) (HCC)    2. Hypoxia    3. Elevated serum creatinine    4. Pleural effusion    5. Acute on chronic diastolic heart failure (HCC)    6. Palliative care patient         Postoperative diagnosis: Same.    Surgeon: Kwabena Romero MD     Assistant: None. No qualified resident was available.    Blood loss: 5 ml    Specimens: none.     Findings:   Right tunneled pleural catheter placement.    Complications: None immediate.    Anesthesia: conscious sedation

## 2024-08-19 NOTE — PHYSICAL THERAPY NOTE
PHYSICAL THERAPY CANCELLATION NOTE          Patient Name: Farnaz Martin  Today's Date: 8/19/2024 08/19/24 4076   Note Type   Note Type Cancelled Session   Cancel Reasons Other   Assessment   Assessment Entered pt's room to provide PT tx. Upon arrival to room pt standing at EOB and fixing linens. Pt report she has been up and walking to/from the bathroom independently (assistance 1x post procedure w/ RN confirming) and declines need for PT at this exact time. PT will continue to follow pt during current admission to promote increased activity and independence w/ functional mobility pending pt's medical plan of care.         Aspen Lobato, PT, DPT  08/19/24

## 2024-08-20 VITALS
BODY MASS INDEX: 28.02 KG/M2 | TEMPERATURE: 98.4 F | OXYGEN SATURATION: 100 % | WEIGHT: 153.22 LBS | SYSTOLIC BLOOD PRESSURE: 120 MMHG | RESPIRATION RATE: 18 BRPM | HEART RATE: 61 BPM | DIASTOLIC BLOOD PRESSURE: 72 MMHG

## 2024-08-20 LAB
ALBUMIN SERPL BCG-MCNC: 3.3 G/DL (ref 3.5–5)
ALP SERPL-CCNC: 69 U/L (ref 34–104)
ALT SERPL W P-5'-P-CCNC: 22 U/L (ref 7–52)
ANION GAP SERPL CALCULATED.3IONS-SCNC: 5 MMOL/L (ref 4–13)
AST SERPL W P-5'-P-CCNC: 20 U/L (ref 13–39)
BASOPHILS # BLD AUTO: 0.06 THOUSANDS/ÂΜL (ref 0–0.1)
BASOPHILS NFR BLD AUTO: 1 % (ref 0–1)
BILIRUB SERPL-MCNC: 0.78 MG/DL (ref 0.2–1)
BUN SERPL-MCNC: 22 MG/DL (ref 5–25)
CALCIUM ALBUM COR SERPL-MCNC: 9.7 MG/DL (ref 8.3–10.1)
CALCIUM SERPL-MCNC: 9.1 MG/DL (ref 8.4–10.2)
CHLORIDE SERPL-SCNC: 94 MMOL/L (ref 96–108)
CO2 SERPL-SCNC: 36 MMOL/L (ref 21–32)
CREAT SERPL-MCNC: 1.1 MG/DL (ref 0.6–1.3)
EOSINOPHIL # BLD AUTO: 0.28 THOUSAND/ÂΜL (ref 0–0.61)
EOSINOPHIL NFR BLD AUTO: 5 % (ref 0–6)
ERYTHROCYTE [DISTWIDTH] IN BLOOD BY AUTOMATED COUNT: 14.6 % (ref 11.6–15.1)
GFR SERPL CREATININE-BSD FRML MDRD: 47 ML/MIN/1.73SQ M
GLUCOSE SERPL-MCNC: 114 MG/DL (ref 65–140)
GLUCOSE SERPL-MCNC: 205 MG/DL (ref 65–140)
GLUCOSE SERPL-MCNC: 67 MG/DL (ref 65–140)
GLUCOSE SERPL-MCNC: 73 MG/DL (ref 65–140)
HCT VFR BLD AUTO: 35.4 % (ref 34.8–46.1)
HGB BLD-MCNC: 11 G/DL (ref 11.5–15.4)
IMM GRANULOCYTES # BLD AUTO: 0.03 THOUSAND/UL (ref 0–0.2)
IMM GRANULOCYTES NFR BLD AUTO: 1 % (ref 0–2)
LYMPHOCYTES # BLD AUTO: 1.63 THOUSANDS/ÂΜL (ref 0.6–4.47)
LYMPHOCYTES NFR BLD AUTO: 27 % (ref 14–44)
MCH RBC QN AUTO: 27.8 PG (ref 26.8–34.3)
MCHC RBC AUTO-ENTMCNC: 31.1 G/DL (ref 31.4–37.4)
MCV RBC AUTO: 89 FL (ref 82–98)
MONOCYTES # BLD AUTO: 0.72 THOUSAND/ÂΜL (ref 0.17–1.22)
MONOCYTES NFR BLD AUTO: 12 % (ref 4–12)
NEUTROPHILS # BLD AUTO: 3.35 THOUSANDS/ÂΜL (ref 1.85–7.62)
NEUTS SEG NFR BLD AUTO: 54 % (ref 43–75)
NRBC BLD AUTO-RTO: 0 /100 WBCS
PLATELET # BLD AUTO: 246 THOUSANDS/UL (ref 149–390)
PMV BLD AUTO: 10.3 FL (ref 8.9–12.7)
POTASSIUM SERPL-SCNC: 4 MMOL/L (ref 3.5–5.3)
PROT SERPL-MCNC: 5.8 G/DL (ref 6.4–8.4)
RBC # BLD AUTO: 3.96 MILLION/UL (ref 3.81–5.12)
SODIUM SERPL-SCNC: 135 MMOL/L (ref 135–147)
WBC # BLD AUTO: 6.07 THOUSAND/UL (ref 4.31–10.16)

## 2024-08-20 PROCEDURE — 85025 COMPLETE CBC W/AUTO DIFF WBC: CPT

## 2024-08-20 PROCEDURE — 82948 REAGENT STRIP/BLOOD GLUCOSE: CPT

## 2024-08-20 PROCEDURE — 99239 HOSP IP/OBS DSCHRG MGMT >30: CPT | Performed by: INTERNAL MEDICINE

## 2024-08-20 PROCEDURE — 80053 COMPREHEN METABOLIC PANEL: CPT

## 2024-08-20 RX ORDER — BUMETANIDE 2 MG/1
2 TABLET ORAL DAILY
Qty: 30 TABLET | Refills: 1 | Status: SHIPPED | OUTPATIENT
Start: 2024-08-21

## 2024-08-20 RX ADMIN — FAMOTIDINE 20 MG: 20 TABLET, FILM COATED ORAL at 08:22

## 2024-08-20 RX ADMIN — BUMETANIDE 2 MG: 1 TABLET ORAL at 08:22

## 2024-08-20 RX ADMIN — ACETAMINOPHEN 650 MG: 325 TABLET, FILM COATED ORAL at 14:38

## 2024-08-20 RX ADMIN — DILTIAZEM HYDROCHLORIDE 120 MG: 120 CAPSULE, COATED, EXTENDED RELEASE ORAL at 08:22

## 2024-08-20 RX ADMIN — Medication 1000 UNITS: at 08:22

## 2024-08-20 RX ADMIN — METOPROLOL SUCCINATE 75 MG: 50 TABLET, EXTENDED RELEASE ORAL at 06:43

## 2024-08-20 RX ADMIN — APIXABAN 5 MG: 5 TABLET, FILM COATED ORAL at 08:23

## 2024-08-20 RX ADMIN — FLECAINIDE ACETATE 150 MG: 50 TABLET ORAL at 08:22

## 2024-08-20 RX ADMIN — INSULIN LISPRO 1 UNITS: 100 INJECTION, SOLUTION INTRAVENOUS; SUBCUTANEOUS at 11:08

## 2024-08-20 RX ADMIN — OXYCODONE HYDROCHLORIDE AND ACETAMINOPHEN 500 MG: 500 TABLET ORAL at 08:22

## 2024-08-20 NOTE — ASSESSMENT & PLAN NOTE
Pt endorsing orthopnea, SOB  Productive in nature without production of sputum  Uses Spiriva inhaler and Robitussin at home for sx's  R/p chest x-ray shows increased congestion, persistent pleural effusion that is worsening on chest x-rays  Cough component likely CHF exacerbation  Pulmonology was consulted, recommended no procedural interventions at this time, but recommended   -Continue XOPENEX inhalation solution 1.25 mg   -Continue home inhalers and nebulizer treatment on discharge.   -Possible home oxygen evaluation   - s/p thoracentesis 8/13     Plan:  Atrovent neb four times daily  XOPENEX inhalation solution 1.25 mg   Continue home inhalers and nebulizer treatment on discharge.   Possible home oxygen evaluation   Thoracocentesis performed, 1300 mL clear yellow fluid drained from right pleural space.   Monitor oxygen status today, wean as tolerated  Pleural catheter in place, will be discharged on catheter

## 2024-08-20 NOTE — PROGRESS NOTES
Patient:  TANISHA LEVINE    MRN:  [id number]    Aidin Request ID:  [id number]    Level of care reserved:  Skilled Nursing Facility    Partner Reserved:  [facility], [place] [address] [phone]    Clinical needs requested:    Geography searched:  10 miles around [id number]    Start of Service:    Request sent:  9:36am EDT on 8/20/2024 by Shaniqua Vasquez    Partner reserved:  10:48am EDT on 8/20/2024 by Shaniqua Vasquez    Choice list shared:  10:43am EDT on 8/20/2024 by Shaniqua Vasquez

## 2024-08-20 NOTE — DISCHARGE INSTR - AVS FIRST PAGE
Dear Farnaz Martin,     It was our pleasure to care for you here at [facility].  It is our hope that we were always able to exceed the expected standards for your care during your stay.  You were hospitalized due to CHF exacerbation.  You were cared for on the 3rd floor by Steve Tripathi MD under the service of Ariella Hobson MD with the [facility] who covers for your primary care physician (PCP), Naveen Monsivais MD, while you were hospitalized.  If you have any questions or concerns related to this hospitalization, you may contact us at .  For follow up as well as any medication refills, we recommend that you follow up with your primary care physician.  A registered nurse will reach out to you by phone within a few days after your discharge to answer any additional questions that you may have after going home.  However, at this time we provide for you here, the most important instructions / recommendations at discharge:     Notable Medication Adjustments -   Please start taking Bumex 2 mg once per day.  Do Not take torsemide  Testing Required after Discharge -   None  Important follow up information -   Please follow-up with your PCP regarding further medication management  Please review this entire after visit summary as additional general instructions including medication list, appointments, activity, diet, any pertinent wound care, and other additional recommendations from your care team that may be provided for you.      Sincerely,     Steve Tripathi MD

## 2024-08-20 NOTE — PLAN OF CARE

## 2024-08-20 NOTE — ASSESSMENT & PLAN NOTE
Lab Results   Component Value Date    LVEF 50 07/14/2024    BNP 1,091 (H) 08/06/2024    BNP 1,428 (H) 07/24/2024     Acute and chronic respiratory failure with hypoxia 2/2 CHF exacerbation a/e/b hypoxia, increased oxygen demands requiring CXR, Nebs, PO bumex and 4L NC oxygen.    Presents with increased shortness of breath, dyspnea on exertion, lower extremity edema, and cough with productive sputum  Patient is currently volume overloaded.  Initial hx: 3 to 4-day history of progressively worsening dyspnea, dry cough, worsening lower extremity edema.  She required increased oxygen when EMS arrived. difficulty talking normal sentences secondary to shortness of breath.  She is now unable to lie flat.  She was discharged on recent admission with 10 mg torsemide daily.  Clinical suspicion is under diuresis leading to volume overload.  Findings: ED provider: She is at baseline on 2 L O2 supplemental O2 and, because of hypoxia to the 80s, was bumped up to 4.  On arrival 97% on 4L (baseline chronic 2L NC)  Currently pt is on room air saturating appopriately for her, diuresed approximately 5.6L of fluid since admission, improving SOB   Was seen by heart failure team 8/12    Serial cxr shows stable pleural effusion   Most recent Echo 50% LVEF     Weight (last 2 days)     Date/Time Weight    08/20/24 0600 69.5 (153.22)    08/19/24 0600 70.2 (154.76)    08/18/24 0600 70.9 (156.4)        I/O: - 5.6 L since admission    Plan:  Per cardiology: PO Bumex 2 mg daily   B-Blocker: Toprol  Cardiac diet, sodium restriction  CMP, magnesium a.m; Goal Mg > 2 and K > 4; Replete prn  Daily standing weights, Measure I/O  Thoracocentesis performed, 1300 mL clear yellow fluid drained from right pleural space.   Discussed with cardiology, appropriate for discharge on Bumex 2mg QD for maintenance diuretic dose

## 2024-08-20 NOTE — ASSESSMENT & PLAN NOTE
Lab Results   Component Value Date    LVEF 50 07/14/2024    BNP 1,091 (H) 08/06/2024    BNP 1,428 (H) 07/24/2024     Acute and chronic respiratory failure with hypoxia 2/2 CHF exacerbation a/e/b hypoxia, increased oxygen demands requiring CXR, Nebs, PO bumex and 4L NC oxygen.    Presents with increased shortness of breath, dyspnea on exertion, lower extremity edema, and cough with productive sputum  Patient is currently volume overloaded.  Initial hx: 3 to 4-day history of progressively worsening dyspnea, dry cough, worsening lower extremity edema.  She required increased oxygen when EMS arrived. difficulty talking normal sentences secondary to shortness of breath.  She is now unable to lie flat.  She was discharged on recent admission with 10 mg torsemide daily.  Clinical suspicion is under diuresis leading to volume overload.  Findings: ED provider: She is at baseline on 2 L O2 supplemental O2 and, because of hypoxia to the 80s, was bumped up to 4.  On arrival 97% on 4L (baseline chronic 2L NC)  Currently pt is on room air saturating appopriately for her, diuresed approximately 5.6L of fluid since admission, improving SOB   Was seen by heart failure team 8/12    Serial cxr shows stable pleural effusion   Most recent Echo 50% LVEF     Weight (last 2 days)       Date/Time Weight    08/19/24 0600 70.2 (154.76)    08/18/24 0600 70.9 (156.4)          I/O: - 5.6 L since admission    Plan:  Per cardiology: PO Bumex 2 mg daily   B-Blocker: Toprol  Cardiac diet, sodium restriction  CMP, magnesium a.m; Goal Mg > 2 and K > 4; Replete prn  Daily standing weights, Measure I/O  Thoracocentesis performed, 1300 mL clear yellow fluid drained from right pleural space.   Discussed with cardiology, appropriate for discharge on Bumex 2mg QD for maintenance diuretic dose

## 2024-08-20 NOTE — ASSESSMENT & PLAN NOTE
Recent Labs     08/18/24  0604 08/19/24  0613   CREATININE 1.02 1.20   EGFR 52 42     Estimated Creatinine Clearance: 34.3 mL/min (by C-G formula based on SCr of 1.2 mg/dL).    Elevated creatinine, continue to monitor    Plan:  PO Bumex 2mg daily  Monitor BMP  Avoid hypoperfusion of the kidneys, minimize nephrotoxins  Currently at baseline, continue to monitor with daily labs

## 2024-08-20 NOTE — DISCHARGE SUMMARY
[facility]  Discharge- Farnaz Martin 1945, 79 y.o. female MRN: [id number]  Unit/Bed#: S -[facility] Encounter: [id number]  Primary Care Provider: Naveen Monsivais MD   Date and time admitted to hospital: 8/6/2024  1:48 PM    * Acute on chronic heart failure (HCC)  Assessment & Plan    Lab Results   Component Value Date    LVEF 50 07/14/2024    BNP 1,091 (H) 08/06/2024    BNP 1,428 (H) 07/24/2024     Acute and chronic respiratory failure with hypoxia 2/2 CHF exacerbation a/e/b hypoxia, increased oxygen demands requiring CXR, Nebs, PO bumex and 4L NC oxygen.    Presents with increased shortness of breath, dyspnea on exertion, lower extremity edema, and cough with productive sputum  Patient is currently volume overloaded.  Initial hx: 3 to 4-day history of progressively worsening dyspnea, dry cough, worsening lower extremity edema.  She required increased oxygen when EMS arrived. difficulty talking normal sentences secondary to shortness of breath.  She is now unable to lie flat.  She was discharged on recent admission with 10 mg torsemide daily.  Clinical suspicion is under diuresis leading to volume overload.  Findings: ED provider: She is at baseline on 2 L O2 supplemental O2 and, because of hypoxia to the 80s, was bumped up to 4.  On arrival 97% on 4L (baseline chronic 2L NC)  Currently pt is on room air saturating appopriately for her, diuresed approximately 5.6L of fluid since admission, improving SOB   Was seen by heart failure team 8/12    Serial cxr shows stable pleural effusion   Most recent Echo 50% LVEF     Weight (last 2 days)       Date/Time Weight    08/20/24 0600 69.5 (153.22)    08/19/24 0600 70.2 (154.76)    08/18/24 0600 70.9 (156.4)          I/O: - 5.6 L since admission    Plan:  Per cardiology: PO Bumex 2 mg daily   B-Blocker: Toprol  Cardiac diet, sodium restriction  CMP, magnesium a.m; Goal Mg > 2 and K > 4; Replete prn  Daily standing weights, Measure  I/O  Thoracocentesis performed, 1300 mL clear yellow fluid drained from right pleural space.   Discussed with cardiology, appropriate for discharge on Bumex 2mg QD for maintenance diuretic dose          Elevated serum creatinine  Assessment & Plan    Recent Labs     08/18/24  0604 08/19/24  0613 08/20/24  0627   CREATININE 1.02 1.20 1.10   EGFR 52 42 47       Estimated Creatinine Clearance: 37.3 mL/min (by C-G formula based on SCr of 1.1 mg/dL).    Elevated creatinine, continue to monitor    Plan:  PO Bumex 2mg daily  Monitor BMP  Avoid hypoperfusion of the kidneys, minimize nephrotoxins  Currently at baseline, continue to monitor with daily labs    Cough  Assessment & Plan  Pt endorsing orthopnea, SOB  Productive in nature without production of sputum  Uses Spiriva inhaler and Robitussin at home for sx's  R/p chest x-ray shows increased congestion, persistent pleural effusion that is worsening on chest x-rays  Cough component likely CHF exacerbation  Pulmonology was consulted, recommended no procedural interventions at this time, but recommended   -Continue XOPENEX inhalation solution 1.25 mg   -Continue home inhalers and nebulizer treatment on discharge.   -Possible home oxygen evaluation   - s/p thoracentesis 8/13     Plan:  Atrovent neb four times daily  XOPENEX inhalation solution 1.25 mg   Continue home inhalers and nebulizer treatment on discharge.   Possible home oxygen evaluation   Thoracocentesis performed, 1300 mL clear yellow fluid drained from right pleural space.   Monitor oxygen status today, wean as tolerated  Pleural catheter in place, will be discharged on catheter      Paroxysmal A-fib (HCC)  Assessment & Plan  Afib-  S/p ablation in 03/2021.   EKG- AV dual-paced rhythm, Vent. rate has decreased by 14 BPM since prior EKG  Low voltage QRS    Plan:  Eliquis 5 mg twice daily, hold night and morning dose prior to pleural catheter placement  Continue flecainide 150 mg twice daily and metoprolol  succinate 75 mg twice daily  Continue diltiazem 120 mg daily    Palliative care patient  Assessment & Plan  Pt was consulted by palliative care and daughter was present in room as POA, discussed hospice topic as her condition is a stepwise progression.  Continued discussion with goals of care going forward as pt's prognosis progresses.   Plan for discharge to rehab and hospice with pleural catheter in place    Recurrent pleural effusion on right  Assessment & Plan  Thoracocentesis performed, 1300 mL clear yellow fluid drained from right pleural space.   Pleural catheter in place, appropriate for discharge     Goals of care, counseling/discussion  Assessment & Plan  Discussion with home hospice, daughter will discuss with patient.  Planning for home hospice following Asept catheter placement 8/19.               Medical Problems       Resolved Problems  Date Reviewed: 8/19/2024   None         Admission Date:   Admission Orders (From admission, onward)       Ordered        08/06/24 1607  INPATIENT ADMISSION  Once                            Admitting Diagnosis: SOB (shortness of breath) [R06.02]  Hypoxia [R09.02]  Elevated serum creatinine [R79.89]  Acute exacerbation of CHF (congestive heart failure) (HCC) [I50.9]    Procedures Performed: No orders of the defined types were placed in this encounter.      Summary of Hospital Course:   79-year-old female with past medical history significant for CHF, type 2 diabetes, paroxysmal A-fib, who was recently discharged from the hospital on 7/29/2024 after being treated for KIMBERLEY and CHF. She was discharged on a diuretic dose that was sub-therapeutic and was admitted for acute on chronic congestive heart failure.  She appeared volume overloaded.  She has a history of recurrent pleural effusions and also presented with decreased breath sounds on the right lung.  Cardiology and pulmonology were consulted during this admission.  She received thoracentesis which responded  appropriately.  During this admission, she was treated with iv bumex 2mg BID initially and tolerated well as she diuresed appropriately 7.3 liters of fluid in total.  Following completion of thoracentesis, the discussion was made with pulmonology for continued recurrence of pleural effusion to receive placement of a pleural catheter.  She received the pleural catheter on 8/19 which went successfully.  Her breath sounds improved and she became less short of breath with decreasing productive cough.  Her diuretic was titrated down to PO Bumex 2mg once per day as a maintenance dose that was discussed with cardiology.  Goals of care discussion was had with family and patient that she will be discharged to SNF at this time.  At time of discharge, patient was hemodynamically/vitally stable and medically cleared for discharge.      Significant Findings, Care, Treatment and Services Provided: thoracentesis, pleural catheter placement    Complications: none    Condition at Discharge: stable     Physical Exam:  Constitutional:       General: She is not in acute distress.     Appearance: She is not toxic-appearing.   HENT:      Head: Normocephalic and atraumatic.      Nose: Nose normal.      Mouth/Throat:      Pharynx: Oropharynx is clear.   Eyes:      Conjunctiva/sclera: Conjunctivae normal.   Cardiovascular:      Rate and Rhythm: Normal rate and regular rhythm.   Pulmonary:   Pleural catheter in place     Effort: Pulmonary effort is normal.      Breath sounds: No wheezing, rhonchi or rales.      Comments: Decreased breath sounds R lower lung fields  Abdominal:      Palpations: Abdomen is soft.      Tenderness: There is no abdominal tenderness. There is no guarding.   Musculoskeletal:      Right lower leg: No edema.      Left lower leg: No edema.   Skin:     General: Skin is warm and dry.   Neurological:      Mental Status: She is alert and oriented to person, place, and time. Mental status is at baseline.   Psychiatric:          Mood and Affect: Mood normal.         Behavior: Behavior normal.         Thought Content: Thought content normal.         Judgment: Judgment normal.     Discharge instructions/Information to patient and family:   See after visit summary for information provided to patient and family.      Provisions for Follow-Up Care:  See after visit summary for information related to follow-up care and any pertinent home health orders.      PCP: Naveen Monsivais MD    Disposition: Skilled nursing facility at [facility]    Planned Readmission: No    Discharge Statement   I spent 60 minutes discharging the patient. This time was spent on the day of discharge. I had direct contact with the patient on the day of discharge. Additional documentation is required if more than 30 minutes were spent on discharge.     Discharge Medications:  See after visit summary for reconciled discharge medications provided to patient and family.

## 2024-08-20 NOTE — CASE MANAGEMENT
Case Management Discharge Planning Note    Patient name Farnaz Martin  Location S /S -01 MRN [id number]  : 1945 Date 2024       Current Admission Date: 2024  Current Admission Diagnosis:Acute on chronic heart failure (HCC)   Patient Active Problem List    Diagnosis Date Noted Date Diagnosed    Palliative care patient 2024     Shortness of breath 2024     Thyroid nodule 2024     Acute kidney injury superimposed on CKD (chronic kidney disease) stage 3, GFR 30-59 ml/min ([facility]) 2024     Fatigue 2024     Recurrent pleural effusion on right 2024     Syncope and collapse 2024     Fall 2024     Type 2 diabetes mellitus with microalbuminuria, without long-term current use of insulin ([facility]) 2024     Paroxysmal A-fib ([facility]) 2024     Ambulatory dysfunction 2024     Hyponatremia 2024     Left renal mass 2024     Cough 2024     Bronchiectasis without complication ([facility]) 2024     Multiple thyroid nodules 2024     Abnormal CT of the chest 2023     S/P revision of total knee, left 2023    Nonrheumatic mitral valve regurgitation 2023     Acute on chronic heart failure ([facility]) 2022     Elevated serum creatinine 2022     Meningioma ([facility]) 2022     Chronic tension-type headache, not intractable 2022     Vasomotor rhinitis 2021     MGUS (monoclonal gammopathy of unknown significance) 2021     Hurthle cell adenoma of thyroid 2021     Tremors of nervous system 2021     Hx of diplopia 2021     S/P placement of cardiac pacemaker 2021     History of sick sinus syndrome s/p PPM 2021     Current use of long term anticoagulation 2021     Malignant neoplasm of thyroid gland (HCC) 2021     Chronic heart failure with preserved ejection fraction ([facility]) 2021     Goals of care, counseling/discussion 2020     Chronic  rhinitis 11/10/2020     Arthritis of both acromioclavicular joints 03/06/2020     Carpal tunnel syndrome on right 11/01/2019     Osteoarthritis of shoulder      TMJ syndrome 10/18/2017     Essential hypertension 04/19/2016     Peripheral neuropathy 03/07/2016     Lumbar spondylosis 06/29/2015     Lumbar stenosis 06/29/2015     Restless legs syndrome 07/09/2014     Allergic rhinitis 04/01/2013     Osteoarthritis of knee 04/01/2013     Insomnia 01/04/2013     Osteopenia 11/26/2012     Fibromyalgia 10/16/2012     Hyperlipidemia 10/16/2012     Osteoarthrosis, hand 10/16/2012     Vitamin D deficiency 10/16/2012       LOS (days): 14  Geometric Mean LOS (GMLOS) (days): 3.9  Days to GMLOS:-10.1     OBJECTIVE:  Risk of Unplanned Readmission Score: 45.18         Current admission status: Inpatient   Preferred Pharmacy:   [id number] [place] - [address]  [address]  [facility] [address]  Phone: [phone] Fax: [phone]    [facility] - [facility] [address] [place] [address]  [address] [facility]  [address]  [facility] [address]  Phone: [phone] Fax: [phone]    Primary Care Provider: Naveen Monsivais MD    Primary Insurance: MEDICARE  Secondary Insurance: [facility]    DISCHARGE DETAILS:                                          Other Referral/Resources/Interventions Provided:  Interventions: Transportation  Referral Comments: Per [facility] - pt medically stable for d/c today. CM f/u with [facility] via [facility] re: same. [facility] confirmed able to accept pt to facility today. Transport referral placed to [facility], confirmed for 1630 via [facility] BLS to S [facility] today (BLS requested due to pt oxygen need on exertion). All parties notified of same. CM attached asept pleural drainage cath dc instructions in aidin for S SNF reference.         Treatment Team Recommendation: Short Term Rehab  Discharge Destination Plan:: Short Term Rehab  Transport at Discharge : [facility]        Transported by (Last Guide and  Unit #): [facility]  ETA of Transport (Date): 08/20/24  ETA of Transport (Time): 1630           IMM reviewed with patient, patient agrees with discharge determination.  IMM Given (Date):: 08/20/24  IMM Given to:: Patient

## 2024-08-20 NOTE — ASSESSMENT & PLAN NOTE
Pt was consulted by palliative care and daughter was present in room as POA, discussed hospice topic as her condition is a stepwise progression.  Continued discussion with goals of care going forward as pt's prognosis progresses.   Plan for discharge to rehab and hospice with pleural catheter in place

## 2024-08-20 NOTE — ASSESSMENT & PLAN NOTE
Recent Labs     08/18/24  0604 08/19/24  0613 08/20/24  0627   CREATININE 1.02 1.20 1.10   EGFR 52 42 47       Estimated Creatinine Clearance: 37.3 mL/min (by C-G formula based on SCr of 1.1 mg/dL).    Elevated creatinine, continue to monitor    Plan:  PO Bumex 2mg daily  Monitor BMP  Avoid hypoperfusion of the kidneys, minimize nephrotoxins  Currently at baseline, continue to monitor with daily labs

## 2024-08-20 NOTE — CASE MANAGEMENT
Case Management Discharge Planning Note    Patient name Farnaz Martin  Location S /S -01 MRN [id number]  : 1945 Date 2024       Current Admission Date: 2024  Current Admission Diagnosis:Acute on chronic heart failure (HCC)   Patient Active Problem List    Diagnosis Date Noted Date Diagnosed    Palliative care patient 2024     Shortness of breath 2024     Thyroid nodule 2024     Acute kidney injury superimposed on CKD (chronic kidney disease) stage 3, GFR 30-59 ml/min ([facility]) 2024     Fatigue 2024     Recurrent pleural effusion on right 2024     Syncope and collapse 2024     Fall 2024     Type 2 diabetes mellitus with microalbuminuria, without long-term current use of insulin ([facility]) 2024     Paroxysmal A-fib ([facility]) 2024     Ambulatory dysfunction 2024     Hyponatremia 2024     Left renal mass 2024     Cough 2024     Bronchiectasis without complication ([facility]) 2024     Multiple thyroid nodules 2024     Abnormal CT of the chest 2023     S/P revision of total knee, left 2023    Nonrheumatic mitral valve regurgitation 2023     Acute on chronic heart failure ([facility]) 2022     Elevated serum creatinine 2022     Meningioma ([facility]) 2022     Chronic tension-type headache, not intractable 2022     Vasomotor rhinitis 2021     MGUS (monoclonal gammopathy of unknown significance) 2021     Hurthle cell adenoma of thyroid 2021     Tremors of nervous system 2021     Hx of diplopia 2021     S/P placement of cardiac pacemaker 2021     History of sick sinus syndrome s/p PPM 2021     Current use of long term anticoagulation 2021     Malignant neoplasm of thyroid gland (HCC) 2021     Chronic heart failure with preserved ejection fraction ([facility]) 2021     Goals of care, counseling/discussion 2020     Chronic  rhinitis 11/10/2020     Arthritis of both acromioclavicular joints 03/06/2020     Carpal tunnel syndrome on right 11/01/2019     Osteoarthritis of shoulder      TMJ syndrome 10/18/2017     Essential hypertension 04/19/2016     Peripheral neuropathy 03/07/2016     Lumbar spondylosis 06/29/2015     Lumbar stenosis 06/29/2015     Restless legs syndrome 07/09/2014     Allergic rhinitis 04/01/2013     Osteoarthritis of knee 04/01/2013     Insomnia 01/04/2013     Osteopenia 11/26/2012     Fibromyalgia 10/16/2012     Hyperlipidemia 10/16/2012     Osteoarthrosis, hand 10/16/2012     Vitamin D deficiency 10/16/2012       LOS (days): 14  Geometric Mean LOS (GMLOS) (days): 3.9  Days to GMLOS:-9.9     OBJECTIVE:  Risk of Unplanned Readmission Score: 46.35         Current admission status: Inpatient   Preferred Pharmacy:   [id number] [place] - [address]  [facility] [address]  Phone: [phone] Fax: [phone]    [facility] [address] [place] [address]  [address] [facility]  [address]  [facility] [address]  Phone: [phone] Fax: [phone]    Primary Care Provider: Naveen Monsivais MD    Primary Insurance: MEDICARE  Secondary Insurance: [facility]    DISCHARGE DETAILS:    CM received VM from patient's daughter Ynes notifying of new dcp for pt to return to [facility]. Referral sent to [facility] via aidin - confirmed and reserved. CM notified [facility] admissions, via phone of pt dcp update for return to [facility]. [facility] referral also canceled in aidin. CM to continue to follow for coordination of pt transport to [facility] when ready for dc.

## 2024-08-20 NOTE — ASSESSMENT & PLAN NOTE
Thoracocentesis performed, 1300 mL clear yellow fluid drained from right pleural space.   Pleural catheter in place, appropriate for discharge

## 2024-08-21 ENCOUNTER — NURSING HOME VISIT (OUTPATIENT)
Dept: GERIATRICS | Facility: OTHER | Age: 79
End: 2024-08-21
Payer: MEDICARE

## 2024-08-21 ENCOUNTER — PATIENT OUTREACH (OUTPATIENT)
Dept: CASE MANAGEMENT | Facility: OTHER | Age: 79
End: 2024-08-21

## 2024-08-21 VITALS
DIASTOLIC BLOOD PRESSURE: 69 MMHG | HEART RATE: 71 BPM | TEMPERATURE: 97.3 F | BODY MASS INDEX: 29.23 KG/M2 | RESPIRATION RATE: 18 BRPM | SYSTOLIC BLOOD PRESSURE: 117 MMHG | WEIGHT: 154.8 LBS | HEIGHT: 61 IN | OXYGEN SATURATION: 97 %

## 2024-08-21 DIAGNOSIS — I50.32 CHRONIC HEART FAILURE WITH PRESERVED EJECTION FRACTION (HCC): Chronic | ICD-10-CM

## 2024-08-21 DIAGNOSIS — R80.9 TYPE 2 DIABETES MELLITUS WITH MICROALBUMINURIA, WITHOUT LONG-TERM CURRENT USE OF INSULIN (HCC): ICD-10-CM

## 2024-08-21 DIAGNOSIS — H90.3 SENSORINEURAL HEARING LOSS (SNHL) OF BOTH EARS: ICD-10-CM

## 2024-08-21 DIAGNOSIS — Z51.5 PALLIATIVE CARE PATIENT: ICD-10-CM

## 2024-08-21 DIAGNOSIS — N28.89 LEFT RENAL MASS: ICD-10-CM

## 2024-08-21 DIAGNOSIS — Z86.79 HISTORY OF SICK SINUS SYNDROME: Chronic | ICD-10-CM

## 2024-08-21 DIAGNOSIS — I48.0 PAROXYSMAL A-FIB (HCC): ICD-10-CM

## 2024-08-21 DIAGNOSIS — E11.29 TYPE 2 DIABETES MELLITUS WITH MICROALBUMINURIA, WITHOUT LONG-TERM CURRENT USE OF INSULIN (HCC): ICD-10-CM

## 2024-08-21 DIAGNOSIS — I50.33 ACUTE ON CHRONIC DIASTOLIC HEART FAILURE (HCC): Primary | ICD-10-CM

## 2024-08-21 DIAGNOSIS — D32.9 MENINGIOMA (HCC): ICD-10-CM

## 2024-08-21 PROCEDURE — 99305 1ST NF CARE MODERATE MDM 35: CPT | Performed by: INTERNAL MEDICINE

## 2024-08-21 NOTE — PROGRESS NOTES
[facility]  Jadon Serrano MD FACP-[facility]  History and physical performed at [facility]   POS 31  Time spent on dictation approximately 30 minutes 20 minutes reviewing chart 40 minutes evaluating patient and dictating note and 10 minutes discussing lifetime options with patient.    NAME: Farnaz Martin  AGE: 79 y.o. SEX: female [id number]    DATE OF ENCOUNTER: 8/21/2024    Assessment and Plan     Problem List Items Addressed This Visit          Cardiovascular and Mediastinum    Chronic heart failure with preserved ejection fraction (HCC) (Chronic)     Wt Readings from Last 3 Encounters:   08/20/24 69.5 kg (153 lb 3.5 oz)   07/29/24 72 kg (158 lb 12.8 oz)   07/28/24 71.9 kg (158 lb 8.2 oz)     Had pleural X catheter placed for patient's right pleural effusion.               History of sick sinus syndrome s/p PPM (Chronic)     With history of sick sinus syndrome pacemaker placed.         Acute on chronic heart failure (HCC) - Primary     Wt Readings from Last 3 Encounters:   08/20/24 69.5 kg (153 lb 3.5 oz)   07/29/24 72 kg (158 lb 12.8 oz)   07/28/24 71.9 kg (158 lb 8.2 oz)   Patient was diuresed approximately 5.6 L of fluid during the admission at the hospital with improvement in the shortness of breath.  Patient's weight went from 70.9 kg to 69.5 kg.  Patient is to continue with Bumex 2 mg daily continue on beta-blocker Toprol with daily weights and measurements of I's and O's.                 Paroxysmal A-fib ([facility])     Continue on Eliquis 5 mg orally twice a day, flecainide 150 mg twice daily and metoprolol succinate 75 mg twice a day along with Cardizem 120 mg orally daily.            Endocrine    Type 2 diabetes mellitus with microalbuminuria, without long-term current use of insulin ([facility])       Lab Results   Component Value Date    HGBA1C 8.8 (H) 07/13/2024   Patient currently on 10 units of glargine insulin also taking Farxiga continue to monitor blood sugars daily.             Nervous and Auditory    Meningioma (HCC)    Sensorineural hearing loss (SNHL) of both ears     Patient with history of hearing loss she does wear hearing aids on a regular basis.            Urinary    Left renal mass     Patient noted to have a left renal mass and previous CT scan.            Care Coordination    Palliative care patient     Of care consult was placed in the hospital daughter was present at the time of discussion.  Goals of care going forward were discussed as patient's prognosis progresses.  The plan was to discharge to rehab and hospice with pleural catheter in place            All medications and routine orders were reviewed and updated as needed.    Plan discussed with: Nurse    Chief Complaint     No chief complaint on file.       History of Present Illness     Patient is an 79-year-old  female who returns from the hospital.  The patient was admitted secondary to increased shortness of breath with imaging studies revealing right-sided pleural effusion.  Patient with noted history of chronic heart failure with preserved ejection fraction and a recurrent transudative pleural effusion on the right.  Interventional radiology was consulted for Pleurx catheter placement.  Patient currently is on palliative care due to her heart failure.  Patient noted to have a 50% ejection fraction by most recent echocardiogram currently being treated by cardiology with p.o. Bumex 2 mg daily and Toprol.  Patient also noted to have chronic renal insufficiency stage III A with her latest GFR being 52 with a creatinine 1.02.  Patient with history of paroxysmal atrial fibrillation had an ablation performed back in March 2021 her EKG had revealed AV dual paced rhythm.  Patient currently on Eliquis 5 mg orally twice a day, flecainide 150 mg twice daily and metoprolol succinate 75 mg twice a day they also continue her Cardizem 120 mg daily.  Patient has been with palliative care apparently the daughter was present  at the time of discussion of hospice as patient's condition is a stepwise progression.  When they performed a thoracentesis they obtained 1300 cc of clear yellow fluid which was drained from the right pleural space.  His last CBC with differential revealed a hemoglobin of 12 and a hematocrit of 38.7.  Patient had lived in her own home close to her daughter.  As she has gradually declined she is thinking about staying on a regular basis at [facility].  Patient's cognition is good she scored a 25 out of 30 on her MoCA evaluation.  Ambulation she utilizes a walker with front wheels and a cane to get around she has fallen at home.  She has hearing aids with history of decreased hearing acuity she uses glasses trifocals.  Patient does have history of stress urinary incontinence.  Wears pads uses approximately 3 pads a day.  She has strong family support.          HISTORY:  Past Surgical History:   Procedure Laterality Date    CARDIAC ELECTROPHYSIOLOGY STUDY AND ABLATION  08/2022    CATARACT EXTRACTION Bilateral     COLONOSCOPY  03/2018    COLONOSCOPY W/ POLYPECTOMY  2021    repeat in 5 years    EYE SURGERY      IR PLEURAL EFFUSION LONG-TERM CATHETER PLACEMENT  8/19/2024    IR THORACENTESIS  7/27/2024    IR THORACENTESIS  8/13/2024    OOPHORECTOMY      NE NEUROPLASTY &/TRANSPOS MEDIAN NRV CARPAL TUNNE Right 11/14/2019    Procedure: RELEASE CARPAL TUNNEL;  Surgeon: Onesimo Tate MD;  Location: [facility];  Service: Orthopedics    NE TOTAL THYROID LOBECTOMY UNI W/WO ISTHMUSECTOMY Left 12/16/2020    Procedure: Left THYROID LOBECTOMY;  Surgeon: Jhony Bhatt MD;  Location: [facility];  Service: ENT    RECTAL POLYPECTOMY      REPLACEMENT TOTAL KNEE Left     last assessed - 27Apr2015    TONSILLECTOMY      TOTAL ABDOMINAL HYSTERECTOMY      TUBAL LIGATION      US GUIDED THYROID BIOPSY  10/14/2020      Past Medical History:   Diagnosis Date    Actinic keratosis     last assessed - 06Jun2014    Arthritis     Atrial  fibrillation with rapid ventricular response (HCC)     last assessed - 26Apr2016    Atrial fibrillation with rapid ventricular response (HCC) 04/19/2016    Basal cell carcinoma     Benign colon polyp     last assessed - [id number]    Bronchiectasis without complication (HCC)     CHF (congestive heart failure) (HCC) 06/2022    Chronic diastolic CHF (congestive heart failure) (HCC)     Disease of thyroid gland     Effusion of knee joint right     Resolved - [id number]    Esophageal reflux     Fibromyalgia     last assessed - [id number]    Fibromyalgia, primary     GERD (gastroesophageal reflux disease)     Hyperlipidemia     Hypertension     Hyponatremia     Malignant neoplasm of thyroid gland (HCC)     Meningioma (HCC)     MGUS (monoclonal gammopathy of unknown significance)     Palpitations     last assessed - [id number]    Peroneal tendonitis, right     last assessed - [id number]    Right lumbar radiculopathy 03/17/2016    Thyroid cancer (HCC)     Thyroid nodule     Trochanteric bursitis of left hip 03/09/2018     Family History   Problem Relation Age of Onset    Heart disease Mother     Diabetes Mother     Heart disease Father     Coronary artery disease Father     Stroke Father         cerebrovascular accident    Heart attack Father         myocardial infarction    Sudden death Father         scd    Other Family         Back disorder    Coronary artery disease Family     Neuropathy Family     Osteoporosis Family     No Known Problems Daughter     No Known Problems Maternal Grandmother     No Known Problems Maternal Grandfather     No Known Problems Paternal Grandmother     No Known Problems Paternal Grandfather     Cancer Maternal Uncle     Breast cancer Maternal Aunt 65    No Known Problems Son     No Known Problems Maternal Aunt     No Known Problems Maternal Aunt     No Known Problems Maternal Aunt     No Known Problems Paternal Aunt     No Known Problems Paternal Aunt     Anuerysm Neg Hx     Clotting disorder Neg  Hx     Arrhythmia Neg Hx     Heart failure Neg Hx      Social History     Socioeconomic History    Marital status:      Spouse name: None    Number of children: None    Years of education: None    Highest education level: None   Occupational History    None   Tobacco Use    Smoking status: Never    Smokeless tobacco: Never   Vaping Use    Vaping status: Never Used   Substance and Sexual Activity    Alcohol use: Not Currently    Drug use: Never    Sexual activity: Not Currently   Other Topics Concern    None   Social History Narrative    ** Merged History Encounter **          Social Determinants of Health     Financial Resource Strain: Patient Unable To Answer (12/19/2023)    Overall Financial Resource Strain (CARDIA)     Difficulty of Paying Living Expenses: Patient unable to answer   Food Insecurity: No Food Insecurity (7/25/2024)    Hunger Vital Sign     Worried About Running Out of Food in the Last Year: Never true     Ran Out of Food in the Last Year: Never true   Transportation Needs: No Transportation Needs (7/25/2024)    PRAPARE - Transportation     Lack of Transportation (Medical): No     Lack of Transportation (Non-Medical): No   Physical Activity: Not on file   Stress: Not on file   Social Connections: Not on file   Intimate Partner Violence: Not At Risk (7/31/2023)    Received from [facility], [facility]    Humiliation, Afraid, Rape, and Kick questionnaire     Fear of Current or Ex-Partner: No     Emotionally Abused: No     Physically Abused: No     Sexually Abused: No   Housing Stability: Low Risk  (7/25/2024)    Housing Stability Vital Sign     Unable to Pay for Housing in the Last Year: No     Number of Times Moved in the Last Year: 0     Homeless in the Last Year: No       Allergies:  Allergies   Allergen Reactions    Sotalol Other (See Comments)     Prolonged QTc leading to torsades de pointes     Penicillins Other (See Comments)     As a child calcium  deposit in the arm     Ace Inhibitors GI Intolerance     Did feel well on it    Omeprazole Abdominal Pain, Rash and Vomiting     stomach pain, vomiting, rash       Review of Systems     Review of Systems   HENT:  Positive for hearing loss.    Respiratory:  Negative for shortness of breath.    Cardiovascular: Negative.    Gastrointestinal: Negative.    Genitourinary:         Stress incontinence   Musculoskeletal:  Positive for gait problem.   Skin: Negative.    Allergic/Immunologic: Negative.    Psychiatric/Behavioral: Negative.         PHQ-2/9 Depression Screening             Medications and orders       Current Outpatient Medications:     albuterol (2.5 mg/3 mL) 0.083 % nebulizer solution, Take 3 mL (2.5 mg total) by nebulization 4 (four) times a day, Disp: , Rfl:     albuterol (ProAir HFA) 90 mcg/act inhaler, Inhale 2 puffs every 6 (six) hours as needed for wheezing, Disp: 8.5 g, Rfl: 3    apixaban (ELIQUIS) 5 mg, Take 1 tablet (5 mg total) by mouth 2 (two) times a day, Disp: 60 tablet, Rfl: 3    ascorbic Acid (VITAMIN C) 500 MG CPCR, Take 500 mg by mouth daily, Disp: , Rfl:     benzonatate (TESSALON PERLES) 100 mg capsule, Take 1 capsule (100 mg total) by mouth 3 (three) times a day as needed for cough, Disp: 20 capsule, Rfl: 0    bumetanide (BUMEX) 2 mg tablet, Take 1 tablet (2 mg total) by mouth daily, Disp: 30 tablet, Rfl: 1    Cetirizine HCl (ZyrTEC Allergy) 10 MG CAPS, Take 10 mg by mouth in the morning, Disp: , Rfl:     Cholecalciferol 50 MCG (2000 UT) CAPS, Take 2,000 Units by mouth 2 (two) times a day, Disp: , Rfl:     dapagliflozin 5 MG TABS, Take 1 tablet (5 mg total) by mouth daily, Disp: 30 tablet, Rfl: 5    diltiazem (CARDIZEM CD) 120 mg 24 hr capsule, Take 120 mg by mouth daily, Disp: , Rfl:     ezetimibe (ZETIA) 10 mg tablet, Take 1 tablet (10 mg total) by mouth daily at bedtime, Disp: 30 tablet, Rfl: 0    famotidine (PEPCID) 20 mg tablet, Take 20 mg by mouth 2 (two) times a day M, W, F in the AM,  Disp: , Rfl:     flecainide (TAMBOCOR) 150 MG tablet, Take 1 tablet (150 mg total) by mouth 2 (two) times a day, Disp: 30 tablet, Rfl: 0    gabapentin (NEURONTIN) 300 mg capsule, take 1 capsule by mouth at bedtime, Disp: 90 capsule, Rfl: 1    glucose blood (OneTouch Verio) test strip, Check blood sugars once daily. Please substitute with appropriate alternative as covered by patient's insurance. Dx: E11.65, Disp: 100 each, Rfl: 3    Insulin Glargine Solostar (Lantus SoloStar) 100 UNIT/ML SOPN, Inject 0.1 mL (10 Units total) under the skin daily at bedtime, Disp: 15 mL, Rfl: 1    Insulin Pen Needle 31G X 4 MM MISC, Use daily at bedtime, Disp: 100 each, Rfl: 2    ipratropium (ATROVENT) 0.03 % nasal spray, 2 sprays into each nostril 3 (three) times a day Begin once per day, then progress to twice per day. Progress to three times a day as tolerated. (Patient taking differently: 2 sprays into each nostril if needed Begin once per day, then progress to twice per day. Progress to three times a day as tolerated.), Disp: 90 mL, Rfl: 3    metoprolol succinate (TOPROL-XL) 25 mg 24 hr tablet, take 3 tablets by mouth every 12 hours, Disp: 540 tablet, Rfl: 2    OneTouch Delica Lancets 33G MISC, Check blood sugars once daily. Please substitute with appropriate alternative as covered by patient's insurance. Dx: E11.65, Disp: 100 each, Rfl: 3    rOPINIRole (REQUIP) 1 mg tablet, Take 2 tablets (2 mg total) by mouth daily at bedtime, Disp: 180 tablet, Rfl: 1    tiotropium (Spiriva Respimat) 2.5 MCG/ACT AERS inhaler, Inhale 2 puffs daily, Disp: 4 g, Rfl: 2    TURMERIC PO, Take 1 capsule by mouth 2 (two) times a day (Patient not taking: Reported on 8/6/2024), Disp: , Rfl:     zolpidem (AMBIEN) 10 mg tablet, Take 1 tablet (10 mg total) by mouth daily at bedtime, Disp: 14 tablet, Rfl: 0  No current facility-administered medications for this visit.       Objective     Vitals:   Vitals:    08/21/24 1210   BP: 117/69   Pulse: 71   Resp: 18  "  Temp: (!) 97.3 °F (36.3 °C)   SpO2: 97%   Weight: 70.2 kg (154 lb 12.8 oz)   Height: 5' 1\" (1.549 m)       Physical Exam  Vitals and nursing note reviewed.   Constitutional:       Appearance: Normal appearance. She is well-developed.   HENT:      Head: Normocephalic.      Right Ear: External ear normal.      Left Ear: External ear normal.      Mouth/Throat:      Mouth: Mucous membranes are dry.   Eyes:      Conjunctiva/sclera: Conjunctivae normal.      Pupils: Pupils are equal, round, and reactive to light.   Cardiovascular:      Rate and Rhythm: Normal rate.      Pulses: Normal pulses.      Heart sounds: Normal heart sounds.   Pulmonary:      Effort: Pulmonary effort is normal.      Breath sounds: Normal breath sounds.   Abdominal:      General: Bowel sounds are normal.      Palpations: Abdomen is soft.   Musculoskeletal:         General: Normal range of motion.      Cervical back: Neck supple.   Skin:     Comments: Right Port-A-Cath noted   Neurological:      Mental Status: She is alert and oriented to person, place, and time. Mental status is at baseline.   Psychiatric:         Mood and Affect: Mood normal.         Behavior: Behavior normal.         Thought Content: Thought content normal.         Judgment: Judgment normal.         Pertinent Laboratory/Diagnostic Studies:   The following labs/studies were reviewed please see facility chart for details.              "

## 2024-08-21 NOTE — ASSESSMENT & PLAN NOTE
Wt Readings from Last 3 Encounters:   08/20/24 69.5 kg (153 lb 3.5 oz)   07/29/24 72 kg (158 lb 12.8 oz)   07/28/24 71.9 kg (158 lb 8.2 oz)     Had pleural X catheter placed for patient's right pleural effusion.

## 2024-08-21 NOTE — ASSESSMENT & PLAN NOTE
Wt Readings from Last 3 Encounters:   08/20/24 69.5 kg (153 lb 3.5 oz)   07/29/24 72 kg (158 lb 12.8 oz)   07/28/24 71.9 kg (158 lb 8.2 oz)   Patient was diuresed approximately 5.6 L of fluid during the admission at the hospital with improvement in the shortness of breath.  Patient's weight went from 70.9 kg to 69.5 kg.  Patient is to continue with Bumex 2 mg daily continue on beta-blocker Toprol with daily weights and measurements of I's and O's.

## 2024-08-21 NOTE — PROGRESS NOTES
ADT alert received the patient discharged 8/20/24 to [facility]. This Admin Coordinator will continue to monitor via chart review.

## 2024-08-21 NOTE — PROGRESS NOTES
OPCM SW received ADT alert that patient has been discharged from the hospital.  Per chart review, patient returned to [facility] for continued STR.  OPCM SW will attempt another outreach to family this week.

## 2024-08-21 NOTE — ASSESSMENT & PLAN NOTE
Of care consult was placed in the hospital daughter was present at the time of discussion.  Goals of care going forward were discussed as patient's prognosis progresses.  The plan was to discharge to rehab and hospice with pleural catheter in place

## 2024-08-21 NOTE — ASSESSMENT & PLAN NOTE
Continue on Eliquis 5 mg orally twice a day, flecainide 150 mg twice daily and metoprolol succinate 75 mg twice a day along with Cardizem 120 mg orally daily.

## 2024-08-21 NOTE — ASSESSMENT & PLAN NOTE
Lab Results   Component Value Date    HGBA1C 8.8 (H) 07/13/2024   Patient currently on 10 units of glargine insulin also taking Farxiga continue to monitor blood sugars daily.

## 2024-08-22 ENCOUNTER — PATIENT OUTREACH (OUTPATIENT)
Dept: CASE MANAGEMENT | Facility: OTHER | Age: 79
End: 2024-08-22

## 2024-08-22 NOTE — PROGRESS NOTES
OPCM SW attempted second outreach to patient's daughter-Ynes regarding medication costs for patient.  Daughter confirmed that patient will be staying at [facility] long term moving forward.  She did inquire about medication costs while at the facility.  OPCM SW encouraged her to confirm with John in Admissions any financial details in how things are paid for.  Daughter appreciative of the call.  No further SW assistance needed at this time.  Referral closed.

## 2024-08-23 ENCOUNTER — NURSING HOME VISIT (OUTPATIENT)
Dept: GERIATRICS | Facility: OTHER | Age: 79
End: 2024-08-23
Payer: MEDICARE

## 2024-08-23 VITALS
OXYGEN SATURATION: 98 % | TEMPERATURE: 97.1 F | WEIGHT: 155 LBS | HEART RATE: 60 BPM | SYSTOLIC BLOOD PRESSURE: 122 MMHG | DIASTOLIC BLOOD PRESSURE: 62 MMHG | BODY MASS INDEX: 29.29 KG/M2

## 2024-08-23 DIAGNOSIS — I50.32 CHRONIC HEART FAILURE WITH PRESERVED EJECTION FRACTION (HCC): Chronic | ICD-10-CM

## 2024-08-23 DIAGNOSIS — R26.2 AMBULATORY DYSFUNCTION: ICD-10-CM

## 2024-08-23 DIAGNOSIS — I10 ESSENTIAL HYPERTENSION: ICD-10-CM

## 2024-08-23 DIAGNOSIS — N18.31 STAGE 3A CHRONIC KIDNEY DISEASE (HCC): ICD-10-CM

## 2024-08-23 DIAGNOSIS — I48.0 PAROXYSMAL A-FIB (HCC): ICD-10-CM

## 2024-08-23 DIAGNOSIS — J90 RECURRENT PLEURAL EFFUSION ON RIGHT: Primary | ICD-10-CM

## 2024-08-23 PROCEDURE — 99309 SBSQ NF CARE MODERATE MDM 30: CPT

## 2024-08-23 NOTE — ASSESSMENT & PLAN NOTE
Wt Readings from Last 3 Encounters:   08/21/24 70.2 kg (154 lb 12.8 oz)   08/20/24 69.5 kg (153 lb 3.5 oz)   07/29/24 72 kg (158 lb 12.8 oz)     With recurrent pleural effusions- see plan   Continue Bumex daily   Continue metoprolol   Monitor weights

## 2024-08-23 NOTE — ASSESSMENT & PLAN NOTE
S/p thoracentesis inpatient   Had pleurex cath placed   Continue daily drainage through 9/4 then EOD

## 2024-08-23 NOTE — ASSESSMENT & PLAN NOTE
Lab Results   Component Value Date    EGFR 47 08/20/2024    EGFR 42 08/19/2024    EGFR 52 08/18/2024    CREATININE 1.10 08/20/2024    CREATININE 1.20 08/19/2024    CREATININE 1.02 08/18/2024     With [facility] inpatient treated with gentle rehydration   Baseline Cr 0.9-1.1  Cr today 1.18  Avoid nephrotoxins, NSAIDs  Routinely monitor BMP

## 2024-08-23 NOTE — ASSESSMENT & PLAN NOTE
HR stable   Continue metoprolol, flecainide and cardizem for rate control   Continue eliquis for AC

## 2024-08-23 NOTE — PROGRESS NOTES
[facility]  [place] [id number], [place], [place]  [phone]  SNF Rehab: POS 31    NAME: Farnaz Martin  AGE: 79 y.o. SEX: female  : 1945     DATE: 2024    CODE STATUS: No CPR     Assessment and Plan:     Problem List Items Addressed This Visit       Essential hypertension     BP acceptable   Continue metoprolol, Bumex   Monitor BP         Ambulatory dysfunction     Multifactorial  Continue PT/OT  Maintain fall and safety precautions  Encourage appropriate DME use    following for discharge planning- goal to optimize function and eventual discharge on hospice            Chronic heart failure with preserved ejection fraction (HCC) (Chronic)     Wt Readings from Last 3 Encounters:   24 70.2 kg (154 lb 12.8 oz)   24 69.5 kg (153 lb 3.5 oz)   24 72 kg (158 lb 12.8 oz)     With recurrent pleural effusions- see plan   Continue Bumex daily   Continue metoprolol   Monitor weights          Paroxysmal A-fib (formerly [facility])     HR stable   Continue metoprolol, flecainide and cardizem for rate control   Continue eliquis for AC          Recurrent pleural effusion on right - Primary     S/p thoracentesis inpatient   Had pleurex cath placed   Continue daily drainage through  then EOD          Acute kidney injury superimposed on CKD (chronic kidney disease) stage 3, GFR 30-59 ml/min (formerly [facility])     Lab Results   Component Value Date    EGFR 47 2024    EGFR 42 2024    EGFR 52 2024    CREATININE 1.10 2024    CREATININE 1.20 2024    CREATININE 1.02 2024     With KIMBERLEY inpatient treated with gentle rehydration   Baseline Cr 0.9-1.1  Cr today 1.18  Avoid nephrotoxins, NSAIDs  Routinely monitor BMP               History of Present Illness:     Patient is a 79 y.o. old female being seen at [facility] for follow up of acute and chronic medical conditions. She is seen and examined sitting on the side of the bed without acute distress.  She just had pleurex catheter drained. Per nursing staff daily drainage is less and less each day. Patient notes she doesn't notice a difference before and after drainage. Denies CP/SOB/HA/dizziness. No fever/chills. Overall doing well. Watched patient ambulate with rolling walker.         The following portions of the patient's history were reviewed and updated as appropriate: allergies, current medications, past family history, past medical history, past social history, past surgical history and problem list.     Review of Systems:     Review of Systems   All other systems reviewed and are negative.       Problem List:     Patient Active Problem List   Diagnosis    Essential hypertension    Allergic rhinitis    Fibromyalgia    Hyperlipidemia    Insomnia    Lumbar spondylosis    Lumbar stenosis    Osteoarthrosis, hand    Osteoarthritis of knee    Osteopenia    Peripheral neuropathy    Restless legs syndrome    TMJ syndrome    Vitamin D deficiency    Osteoarthritis of shoulder    Carpal tunnel syndrome on right    Arthritis of both acromioclavicular joints    Chronic rhinitis    Goals of care, counseling/discussion    Malignant neoplasm of thyroid gland (HCC)    Current use of long term anticoagulation    S/P placement of cardiac pacemaker    Tremors of nervous system    Hx of diplopia    Hurthle cell adenoma of thyroid    MGUS (monoclonal gammopathy of unknown significance)    Vasomotor rhinitis    Chronic tension-type headache, not intractable    Meningioma ([facility])    Acute on chronic heart failure (HCC)    Elevated serum creatinine    Nonrheumatic mitral valve regurgitation    Abnormal CT of the chest    S/P revision of total knee, left    Multiple thyroid nodules    Cough    Bronchiectasis without complication (HCC)    Left renal mass    Ambulatory dysfunction    Hyponatremia    Chronic heart failure with preserved ejection fraction (HCC)    Paroxysmal A-fib ([facility])    History of sick sinus syndrome s/p PPM    Type  2 diabetes mellitus with microalbuminuria, without long-term current use of insulin ([facility])    Syncope and collapse    Fall    Recurrent pleural effusion on right    Acute kidney injury superimposed on CKD (chronic kidney disease) stage 3, GFR 30-59 ml/min ([facility])    Fatigue    Thyroid nodule    Shortness of breath    Palliative care patient    Sensorineural hearing loss (SNHL) of both ears        Objective:     /62   Pulse 60   Temp (!) 97.1 °F (36.2 °C)   Wt 70.3 kg (155 lb)   LMP 02/01/1990 (Within Weeks)   SpO2 98%   BMI 29.29 kg/m²     Physical Exam  Vitals and nursing note reviewed.   Constitutional:       General: She is not in acute distress.     Appearance: She is well-developed.   HENT:      Head: Normocephalic and atraumatic.   Eyes:      Conjunctiva/sclera: Conjunctivae normal.   Cardiovascular:      Rate and Rhythm: Normal rate and regular rhythm.      Heart sounds: No murmur heard.  Pulmonary:      Effort: Pulmonary effort is normal. No respiratory distress.      Breath sounds: Normal breath sounds.   Abdominal:      Palpations: Abdomen is soft.      Tenderness: There is no abdominal tenderness.   Musculoskeletal:         General: No swelling.      Cervical back: Neck supple.   Skin:     General: Skin is warm and dry.      Capillary Refill: Capillary refill takes less than 2 seconds.   Neurological:      General: No focal deficit present.      Mental Status: She is alert and oriented to person, place, and time.   Psychiatric:         Mood and Affect: Mood normal.         Behavior: Behavior normal.         Pertinent Laboratory/Diagnostic Studies:    Laboratory Results: I have personally reviewed the pertinent laboratory results/reports     Radiology/Other Diagnostic Testing Results: I have personally reviewed pertinent reports.      JENNY Epps  Geriatric Medicine

## 2024-08-23 NOTE — ASSESSMENT & PLAN NOTE
Multifactorial  Continue PT/OT  Maintain fall and safety precautions  Encourage appropriate DME use    following for discharge planning- goal to optimize function and eventual discharge on hospice

## 2024-08-26 ENCOUNTER — NURSING HOME VISIT (OUTPATIENT)
Dept: GERIATRICS | Facility: OTHER | Age: 79
End: 2024-08-26
Payer: MEDICARE

## 2024-08-26 DIAGNOSIS — I50.32 CHRONIC HEART FAILURE WITH PRESERVED EJECTION FRACTION (HCC): Chronic | ICD-10-CM

## 2024-08-26 DIAGNOSIS — I48.0 PAROXYSMAL A-FIB (HCC): ICD-10-CM

## 2024-08-26 DIAGNOSIS — J90 RECURRENT PLEURAL EFFUSION ON RIGHT: Primary | ICD-10-CM

## 2024-08-26 DIAGNOSIS — E87.1 HYPONATREMIA: ICD-10-CM

## 2024-08-26 PROCEDURE — 99309 SBSQ NF CARE MODERATE MDM 30: CPT | Performed by: NURSE PRACTITIONER

## 2024-08-27 ENCOUNTER — TELEPHONE (OUTPATIENT)
Dept: OTHER | Facility: OTHER | Age: 79
End: 2024-08-27

## 2024-08-27 ENCOUNTER — PATIENT OUTREACH (OUTPATIENT)
Dept: CASE MANAGEMENT | Facility: OTHER | Age: 79
End: 2024-08-27

## 2024-08-27 NOTE — PROGRESS NOTES
[facility] at [address]  [place]  [address]  [phone]    SNF Rehab: POS 31  Progress Note    ASSESSMENT AND PLAN:  1. Recurrent pleural effusion on right  Assessment & Plan:  History of right sided pleural effusion in the setting of bronchiectasis and pulmonary hypertension.  Status post thoracentesis inpatient with pleural drain placement.  Empty drain daily until  then QOD  Monitor for signs/symptoms of infection  2. Chronic heart failure with preserved ejection fraction (HCC)  Assessment & Plan:  Patient with no increased sob or weight gain.  Bilateral lower extremity edema present  Continue bumex daily  Continue metoprolol  Monitor weights and volume status  3. Hyponatremia  Assessment & Plan:  Sodium level baseline 128-134 inpatient  Sodium currently 138  Continue adequate hydration  Will monitor BMP  4. Paroxysmal A-fib (HCC)  Assessment & Plan:  Rate controlled  Continue metoprolol, flecianide and cardizem  Continue eliquis for AC        Name: Farnaz Martin                 : 1945               Sex: female    HPI:  Patient evaluated today in SNF rehab to follow-up on acute and chronic medical conditions. Upon examination, patient is awake, alert and in no acute distress.  Refer to assessment and plan for further HPI. The following portions of the patient's history were reviewed and updated as appropriate: allergies, current medications, past family history, past medical history, past social history, past surgical history and problem list.    ROS: Review of Systems   Constitutional:  Negative for fatigue and fever.   Respiratory:  Positive for cough. Negative for chest tightness, shortness of breath and wheezing.    Cardiovascular:  Positive for leg swelling. Negative for chest pain and palpitations.   Gastrointestinal:  Negative for abdominal pain.   Genitourinary:  Negative for difficulty urinating and dysuria.   Musculoskeletal:   Positive for gait problem. Negative for arthralgias and myalgias.   Skin:  Negative for color change and wound.   Neurological:  Negative for dizziness, weakness and headaches.       Allergies   Allergen Reactions    Sotalol Other (See Comments)     Prolonged QTc leading to torsades de pointes     Penicillins Other (See Comments)     As a child calcium deposit in the arm     Ace Inhibitors GI Intolerance     Did feel well on it    Omeprazole Abdominal Pain, Rash and Vomiting     stomach pain, vomiting, rash       Medications:    Current Outpatient Medications on File Prior to Visit   Medication Sig Dispense Refill    albuterol (2.5 mg/3 mL) 0.083 % nebulizer solution Take 3 mL (2.5 mg total) by nebulization 4 (four) times a day      albuterol (ProAir HFA) 90 mcg/act inhaler Inhale 2 puffs every 6 (six) hours as needed for wheezing 8.5 g 3    apixaban (ELIQUIS) 5 mg Take 1 tablet (5 mg total) by mouth 2 (two) times a day 60 tablet 3    ascorbic Acid (VITAMIN C) 500 MG CPCR Take 500 mg by mouth daily      benzonatate (TESSALON PERLES) 100 mg capsule Take 1 capsule (100 mg total) by mouth 3 (three) times a day as needed for cough 20 capsule 0    bumetanide (BUMEX) 2 mg tablet Take 1 tablet (2 mg total) by mouth daily 30 tablet 1    Cetirizine HCl (ZyrTEC Allergy) 10 MG CAPS Take 10 mg by mouth in the morning      Cholecalciferol 50 MCG (2000 UT) CAPS Take 2,000 Units by mouth 2 (two) times a day      dapagliflozin 5 MG TABS Take 1 tablet (5 mg total) by mouth daily 30 tablet 5    diltiazem (CARDIZEM CD) 120 mg 24 hr capsule Take 120 mg by mouth daily      ezetimibe (ZETIA) 10 mg tablet Take 1 tablet (10 mg total) by mouth daily at bedtime 30 tablet 0    famotidine (PEPCID) 20 mg tablet Take 20 mg by mouth 2 (two) times a day M, W, F in the AM      flecainide (TAMBOCOR) 150 MG tablet Take 1 tablet (150 mg total) by mouth 2 (two) times a day 30 tablet 0    gabapentin (NEURONTIN) 300 mg capsule take 1 capsule by mouth at  bedtime 90 capsule 1    glucose blood (OneTouch Verio) test strip Check blood sugars once daily. Please substitute with appropriate alternative as covered by patient's insurance. Dx: E11.65 100 each 3    Insulin Glargine Solostar (Lantus SoloStar) 100 UNIT/ML SOPN Inject 0.1 mL (10 Units total) under the skin daily at bedtime 15 mL 1    Insulin Pen Needle 31G X 4 MM MISC Use daily at bedtime 100 each 2    ipratropium (ATROVENT) 0.03 % nasal spray 2 sprays into each nostril 3 (three) times a day Begin once per day, then progress to twice per day. Progress to three times a day as tolerated. 90 mL 3    metoprolol succinate (TOPROL-XL) 25 mg 24 hr tablet take 3 tablets by mouth every 12 hours 540 tablet 2    OneTouch Delica Lancets 33G MISC Check blood sugars once daily. Please substitute with appropriate alternative as covered by patient's insurance. Dx: E11.65 100 each 3    rOPINIRole (REQUIP) 1 mg tablet Take 2 tablets (2 mg total) by mouth daily at bedtime 180 tablet 1    tiotropium (Spiriva Respimat) 2.5 MCG/ACT AERS inhaler Inhale 2 puffs daily 4 g 2    TURMERIC PO Take 1 capsule by mouth 2 (two) times a day      zolpidem (AMBIEN) 10 mg tablet Take 1 tablet (10 mg total) by mouth daily at bedtime 14 tablet 0     No current facility-administered medications on file prior to visit.       History:  Past Medical History:   Diagnosis Date    Actinic keratosis     last assessed - 06Jun2014    Arthritis     Atrial fibrillation with rapid ventricular response (HCC)     last assessed - 26Apr2016    Atrial fibrillation with rapid ventricular response (HCC) 04/19/2016    Basal cell carcinoma     Benign colon polyp     last assessed - 27Apr2015    Bronchiectasis without complication (HCC)     CHF (congestive heart failure) (HCC) 06/2022    Chronic diastolic CHF (congestive heart failure) (HCC)     Disease of thyroid gland     Effusion of knee joint right     Resolved - 19Apr2016    Esophageal reflux     Fibromyalgia     last  assessed - [id number]    Fibromyalgia, primary     GERD (gastroesophageal reflux disease)     Hyperlipidemia     Hypertension     Hyponatremia     Malignant neoplasm of thyroid gland (HCC)     Meningioma (HCC)     MGUS (monoclonal gammopathy of unknown significance)     Palpitations     last assessed - [id number]    Peroneal tendonitis, right     last assessed - [id number]    Right lumbar radiculopathy 03/17/2016    Thyroid cancer (HCC)     Thyroid nodule     Trochanteric bursitis of left hip 03/09/2018     Past Surgical History:   Procedure Laterality Date    CARDIAC ELECTROPHYSIOLOGY STUDY AND ABLATION  08/2022    CATARACT EXTRACTION Bilateral     COLONOSCOPY  03/2018    COLONOSCOPY W/ POLYPECTOMY  2021    repeat in 5 years    EYE SURGERY      IR PLEURAL EFFUSION LONG-TERM CATHETER PLACEMENT  8/19/2024    IR THORACENTESIS  7/27/2024    IR THORACENTESIS  8/13/2024    OOPHORECTOMY      [place] NEUROPLASTY &/TRANSPOS MEDIAN NRV CARPAL TUNNE Right 11/14/2019    Procedure: RELEASE CARPAL TUNNEL;  Surgeon: Onesimo Tate MD;  [facility];  Service: Orthopedics    MI TOTAL THYROID LOBECTOMY UNI W/WO ISTHMUSECTOMY Left 12/16/2020    Procedure: Left THYROID LOBECTOMY;  Surgeon: Jhony Bhatt MD;  [facility];  Service: ENT    RECTAL POLYPECTOMY      REPLACEMENT TOTAL KNEE Left     last assessed - [id number]    TONSILLECTOMY      TOTAL ABDOMINAL HYSTERECTOMY      TUBAL LIGATION      US GUIDED THYROID BIOPSY  10/14/2020     Family History   Problem Relation Age of Onset    Heart disease Mother     Diabetes Mother     Heart disease Father     Coronary artery disease Father     Stroke Father         cerebrovascular accident    Heart attack Father         myocardial infarction    Sudden death Father         scd    Other Family         Back disorder    Coronary artery disease Family     Neuropathy Family     Osteoporosis Family     No Known Problems Daughter     No Known Problems Maternal Grandmother     No Known Problems  Maternal Grandfather     No Known Problems Paternal Grandmother     No Known Problems Paternal Grandfather     Cancer Maternal Uncle     Breast cancer Maternal Aunt 65    No Known Problems Son     No Known Problems Maternal Aunt     No Known Problems Maternal Aunt     No Known Problems Maternal Aunt     No Known Problems Paternal Aunt     No Known Problems Paternal Aunt     Anuerysm Neg Hx     Clotting disorder Neg Hx     Arrhythmia Neg Hx     Heart failure Neg Hx      Social History     Socioeconomic History    Marital status:      Spouse name: Not on file    Number of children: Not on file    Years of education: Not on file    Highest education level: Not on file   Occupational History    Not on file   Tobacco Use    Smoking status: Never    Smokeless tobacco: Never   Vaping Use    Vaping status: Never Used   Substance and Sexual Activity    Alcohol use: Not Currently    Drug use: Never    Sexual activity: Not Currently   Other Topics Concern    Not on file   Social History Narrative    ** Merged History Encounter **          Social Determinants of Health     Financial Resource Strain: Patient Unable To Answer (12/19/2023)    Overall Financial Resource Strain (CARDIA)     Difficulty of Paying Living Expenses: Patient unable to answer   Food Insecurity: No Food Insecurity (7/25/2024)    Hunger Vital Sign     Worried About Running Out of Food in the Last Year: Never true     Ran Out of Food in the Last Year: Never true   Transportation Needs: No Transportation Needs (7/25/2024)    PRAPARE - Transportation     Lack of Transportation (Medical): No     Lack of Transportation (Non-Medical): No   Physical Activity: Not on file   Stress: Not on file   Social Connections: Not on file   Intimate Partner Violence: Not At Risk (7/31/2023)    [facility]    Humiliation, Afraid, Rape, and Kick questionnaire     Fear of Current or Ex-Partner: No     Emotionally  Abused: No     Physically Abused: No     Sexually Abused: No   Housing Stability: Low Risk  (7/25/2024)    Housing Stability Vital Sign     Unable to Pay for Housing in the Last Year: No     Number of Times Moved in the Last Year: 0     Homeless in the Last Year: No     Past Surgical History:   Procedure Laterality Date    CARDIAC ELECTROPHYSIOLOGY STUDY AND ABLATION  08/2022    CATARACT EXTRACTION Bilateral     COLONOSCOPY  03/2018    COLONOSCOPY W/ POLYPECTOMY  2021    repeat in 5 years    EYE SURGERY      IR PLEURAL EFFUSION LONG-TERM CATHETER PLACEMENT  8/19/2024    IR THORACENTESIS  7/27/2024    IR THORACENTESIS  8/13/2024    OOPHORECTOMY      [place] NEUROPLASTY &/TRANSPOS MEDIAN NRV CARPAL TUNNE Right 11/14/2019    Procedure: RELEASE CARPAL TUNNEL;  Surgeon: Onesimo Tate MD;  Location[facility];  Service: Orthopedics    NV TOTAL THYROID LOBECTOMY UNI W/WO ISTHMUSECTOMY Left 12/16/2020    Procedure: Left THYROID LOBECTOMY;  Surgeon: Jhony Bhatt MD;  [facility];  Service: ENT    RECTAL POLYPECTOMY      REPLACEMENT TOTAL KNEE Left     last assessed - 27Apr2015    TONSILLECTOMY      TOTAL ABDOMINAL HYSTERECTOMY      TUBAL LIGATION      US GUIDED THYROID BIOPSY  10/14/2020       OBJECTIVE:  Physical Exam  Vitals and nursing note reviewed.   Constitutional:       General: She is not in acute distress.     Appearance: Normal appearance. She is not ill-appearing, toxic-appearing or diaphoretic.   Cardiovascular:      Rate and Rhythm: Normal rate and regular rhythm.      Pulses: Normal pulses.   Pulmonary:      Effort: Pulmonary effort is normal. No respiratory distress.      Breath sounds: Normal breath sounds. No wheezing, rhonchi or rales.   Abdominal:      General: Bowel sounds are normal. There is no distension.      Palpations: Abdomen is soft. There is no mass.      Tenderness: There is no abdominal tenderness. There is no guarding.   Musculoskeletal:      Right lower leg: Edema present.      Left  lower leg: Edema present.      Comments: Right sided pleural drain, dressing c/d/I, no surrounding erythema, no drainage/warmth present.    Skin:     General: Skin is warm and dry.   Neurological:      General: No focal deficit present.      Mental Status: She is alert. Mental status is at baseline.      Motor: No weakness.      Gait: Gait abnormal.   Psychiatric:         Mood and Affect: Mood normal.         Behavior: Behavior normal.         Thought Content: Thought content normal.         Pertinent labs & Imaging reviewed in EMR.    JENNY Monroy  Geriatric Medicine  08/26/2024

## 2024-08-28 ENCOUNTER — NURSING HOME VISIT (OUTPATIENT)
Dept: GERIATRICS | Facility: OTHER | Age: 79
End: 2024-08-28
Payer: MEDICARE

## 2024-08-28 ENCOUNTER — TELEPHONE (OUTPATIENT)
Age: 79
End: 2024-08-28

## 2024-08-28 DIAGNOSIS — I50.32 CHRONIC HEART FAILURE WITH PRESERVED EJECTION FRACTION (HCC): Chronic | ICD-10-CM

## 2024-08-28 DIAGNOSIS — I10 ESSENTIAL HYPERTENSION: ICD-10-CM

## 2024-08-28 DIAGNOSIS — R26.2 AMBULATORY DYSFUNCTION: ICD-10-CM

## 2024-08-28 DIAGNOSIS — F51.01 PRIMARY INSOMNIA: Primary | ICD-10-CM

## 2024-08-28 PROCEDURE — 99309 SBSQ NF CARE MODERATE MDM 30: CPT | Performed by: NURSE PRACTITIONER

## 2024-08-28 NOTE — PROGRESS NOTES
[facility] at [address]  EREN Shaffer  [id number] [phone]    SNF Rehab: POS 31  Progress Note     ASSESSMENT AND PLAN:  1. Primary insomnia  Assessment & Plan:  Patient takes ambien 10 mg at hs at home  Continue home dose  Monitor for adverse effects.   Continue good sleep hygiene  2. Chronic heart failure with preserved ejection fraction (HCC)  Assessment & Plan:  Patient with no increased sob or weight gain.  Increased bilateral lower extremity edema present  Continue bumex daily; will consider extra dose of bumex  Continue metoprolol  Will order bilateral lower extremity venous dopplers to rule out acute DVT.  Monitor weights and volume status  3. Essential hypertension  Assessment & Plan:  BP trend reviewed; sbp on teens to 120s on average.   Continue metoprolol  Continue bumex  Will monitor electrolytes and renal function  4. Ambulatory dysfunction  Assessment & Plan:  Multifactorial  Continue PT/OT/ST services in SNF rehab  Maintain fall and safety precautions  Continue nutritional and psychosocial support.       Name: Farnaz Martin                 : 1945               Sex: female    HPI:  Patient evaluated today in SNF rehab to follow-up on acute and chronic medical conditions. Upon examination, patient is awake, alert and in no acute distress. She is reported to have increased bilateral lower extremity edema with mild erythema of LLE.  Refer to assessment and plan for further HPI. The following portions of the patient's history were reviewed and updated as appropriate: allergies, current medications, past family history, past medical history, past social history, past surgical history and problem list.    ROS: Review of Systems   Constitutional:  Negative for chills and fever.   Respiratory:  Negative for chest tightness and shortness of breath.    Cardiovascular:  Positive for leg swelling. Negative for chest pain and palpitations.    Gastrointestinal:  Negative for abdominal pain.   Genitourinary:  Negative for difficulty urinating and dysuria.   Musculoskeletal:  Positive for gait problem. Negative for arthralgias and myalgias.   Neurological:  Negative for dizziness and headaches.   Psychiatric/Behavioral:  Positive for sleep disturbance.        Allergies   Allergen Reactions    Sotalol Other (See Comments)     Prolonged QTc leading to torsades de pointes     Penicillins Other (See Comments)     As a child calcium deposit in the arm     Ace Inhibitors GI Intolerance     Did feel well on it    Omeprazole Abdominal Pain, Rash and Vomiting     stomach pain, vomiting, rash       Medications:    Current Outpatient Medications on File Prior to Visit   Medication Sig Dispense Refill    albuterol (2.5 mg/3 mL) 0.083 % nebulizer solution Take 3 mL (2.5 mg total) by nebulization 4 (four) times a day      albuterol (ProAir HFA) 90 mcg/act inhaler Inhale 2 puffs every 6 (six) hours as needed for wheezing 8.5 g 3    apixaban (ELIQUIS) 5 mg Take 1 tablet (5 mg total) by mouth 2 (two) times a day 60 tablet 3    ascorbic Acid (VITAMIN C) 500 MG CPCR Take 500 mg by mouth daily      benzonatate (TESSALON PERLES) 100 mg capsule Take 1 capsule (100 mg total) by mouth 3 (three) times a day as needed for cough 20 capsule 0    bumetanide (BUMEX) 2 mg tablet Take 1 tablet (2 mg total) by mouth daily 30 tablet 1    Cetirizine HCl (ZyrTEC Allergy) 10 MG CAPS Take 10 mg by mouth in the morning      Cholecalciferol 50 MCG (2000 UT) CAPS Take 2,000 Units by mouth 2 (two) times a day      dapagliflozin 5 MG TABS Take 1 tablet (5 mg total) by mouth daily 30 tablet 5    diltiazem (CARDIZEM CD) 120 mg 24 hr capsule Take 120 mg by mouth daily      ezetimibe (ZETIA) 10 mg tablet Take 1 tablet (10 mg total) by mouth daily at bedtime 30 tablet 0    famotidine (PEPCID) 20 mg tablet Take 20 mg by mouth 2 (two) times a day M, W, F in the AM      flecainide (TAMBOCOR) 150 MG tablet  Take 1 tablet (150 mg total) by mouth 2 (two) times a day 30 tablet 0    gabapentin (NEURONTIN) 300 mg capsule take 1 capsule by mouth at bedtime 90 capsule 1    glucose blood (OneTouch Verio) test strip Check blood sugars once daily. Please substitute with appropriate alternative as covered by patient's insurance. Dx: E11.65 100 each 3    Insulin Glargine Solostar (Lantus SoloStar) 100 UNIT/ML SOPN Inject 0.1 mL (10 Units total) under the skin daily at bedtime 15 mL 1    Insulin Pen Needle 31G X 4 MM MISC Use daily at bedtime 100 each 2    ipratropium (ATROVENT) 0.03 % nasal spray 2 sprays into each nostril 3 (three) times a day Begin once per day, then progress to twice per day. Progress to three times a day as tolerated. 90 mL 3    metoprolol succinate (TOPROL-XL) 25 mg 24 hr tablet take 3 tablets by mouth every 12 hours 540 tablet 2    OneTouch Delica Lancets 33G MISC Check blood sugars once daily. Please substitute with appropriate alternative as covered by patient's insurance. Dx: E11.65 100 each 3    rOPINIRole (REQUIP) 1 mg tablet Take 2 tablets (2 mg total) by mouth daily at bedtime 180 tablet 1    tiotropium (Spiriva Respimat) 2.5 MCG/ACT AERS inhaler Inhale 2 puffs daily 4 g 2    TURMERIC PO Take 1 capsule by mouth 2 (two) times a day      zolpidem (AMBIEN) 10 mg tablet Take 1 tablet (10 mg total) by mouth daily at bedtime 14 tablet 0     No current facility-administered medications on file prior to visit.       History:  Past Medical History:   Diagnosis Date    Actinic keratosis     last assessed - 06Jun2014    Arthritis     Atrial fibrillation with rapid ventricular response (HCC)     last assessed - 26Apr2016    Atrial fibrillation with rapid ventricular response (HCC) 04/19/2016    Basal cell carcinoma     Benign colon polyp     last assessed - 27Apr2015    Bronchiectasis without complication (HCC)     CHF (congestive heart failure) (HCC) 06/2022    Chronic diastolic CHF (congestive heart failure)  (HCC)     Disease of thyroid gland     Effusion of knee joint right     Resolved - [id number]    Esophageal reflux     Fibromyalgia     last assessed - [id number]    Fibromyalgia, primary     GERD (gastroesophageal reflux disease)     Hyperlipidemia     Hypertension     Hyponatremia     Malignant neoplasm of thyroid gland (HCC)     Meningioma (HCC)     MGUS (monoclonal gammopathy of unknown significance)     Palpitations     last assessed - [id number]    Peroneal tendonitis, right     last assessed - [id number]    Right lumbar radiculopathy 03/17/2016    Thyroid cancer (HCC)     Thyroid nodule     Trochanteric bursitis of left hip 03/09/2018     Past Surgical History:   Procedure Laterality Date    CARDIAC ELECTROPHYSIOLOGY STUDY AND ABLATION  08/2022    CATARACT EXTRACTION Bilateral     COLONOSCOPY  03/2018    COLONOSCOPY W/ POLYPECTOMY  2021    repeat in 5 years    EYE SURGERY      IR PLEURAL EFFUSION LONG-TERM CATHETER PLACEMENT  8/19/2024    IR THORACENTESIS  7/27/2024    IR THORACENTESIS  8/13/2024    OOPHORECTOMY      [place] NEUROPLASTY &/TRANSPOS MEDIAN NRV CARPAL TUNNE Right 11/14/2019    Procedure: RELEASE CARPAL TUNNEL;  Surgeon: Onesimo Tate MD;  [facility];  Service: Orthopedics    [place] TOTAL THYROID LOBECTOMY UNI W/WO ISTHMUSECTOMY Left 12/16/2020    Procedure: Left THYROID LOBECTOMY;  Surgeon: Jhony Bhatt MD;  [facility];  Service: ENT    RECTAL POLYPECTOMY      REPLACEMENT TOTAL KNEE Left     last assessed - 27Apr2015    TONSILLECTOMY      TOTAL ABDOMINAL HYSTERECTOMY      TUBAL LIGATION      US GUIDED THYROID BIOPSY  10/14/2020     Family History   Problem Relation Age of Onset    Heart disease Mother     Diabetes Mother     Heart disease Father     Coronary artery disease Father     Stroke Father         cerebrovascular accident    Heart attack Father         myocardial infarction    Sudden death Father         scd    Other Family         Back disorder    Coronary artery disease Family      Neuropathy Family     Osteoporosis Family     No Known Problems Daughter     No Known Problems Maternal Grandmother     No Known Problems Maternal Grandfather     No Known Problems Paternal Grandmother     No Known Problems Paternal Grandfather     Cancer Maternal Uncle     Breast cancer Maternal Aunt 65    No Known Problems Son     No Known Problems Maternal Aunt     No Known Problems Maternal Aunt     No Known Problems Maternal Aunt     No Known Problems Paternal Aunt     No Known Problems Paternal Aunt     Anuerysm Neg Hx     Clotting disorder Neg Hx     Arrhythmia Neg Hx     Heart failure Neg Hx      Social History     Socioeconomic History    Marital status:      Spouse name: Not on file    Number of children: Not on file    Years of education: Not on file    Highest education level: Not on file   Occupational History    Not on file   Tobacco Use    Smoking status: Never    Smokeless tobacco: Never   Vaping Use    Vaping status: Never Used   Substance and Sexual Activity    Alcohol use: Not Currently    Drug use: Never    Sexual activity: Not Currently   Other Topics Concern    Not on file   Social History Narrative    ** Merged History Encounter **          Social Determinants of Health     Financial Resource Strain: Patient Unable To Answer (12/19/2023)    Overall Financial Resource Strain (CARDIA)     Difficulty of Paying Living Expenses: Patient unable to answer   Food Insecurity: No Food Insecurity (7/25/2024)    Hunger Vital Sign     Worried About Running Out of Food in the Last Year: Never true     Ran Out of Food in the Last Year: Never true   Transportation Needs: No Transportation Needs (7/25/2024)    PRAPARE - Transportation     Lack of Transportation (Medical): No     Lack of Transportation (Non-Medical): No   Physical Activity: Not on file   Stress: Not on file   Social Connections: Not on file   Intimate Partner Violence: Not At Risk (7/31/2023)    Received from [facility]    Humiliation, Afraid, Rape, and Kick questionnaire     Fear of Current or Ex-Partner: No     Emotionally Abused: No     Physically Abused: No     Sexually Abused: No   Housing Stability: Low Risk  (7/25/2024)    Housing Stability Vital Sign     Unable to Pay for Housing in the Last Year: No     Number of Times Moved in the Last Year: 0     Homeless in the Last Year: No     Past Surgical History:   Procedure Laterality Date    CARDIAC ELECTROPHYSIOLOGY STUDY AND ABLATION  08/2022    CATARACT EXTRACTION Bilateral     COLONOSCOPY  03/2018    COLONOSCOPY W/ POLYPECTOMY  2021    repeat in 5 years    EYE SURGERY      IR PLEURAL EFFUSION LONG-TERM CATHETER PLACEMENT  8/19/2024    IR THORACENTESIS  7/27/2024    IR THORACENTESIS  8/13/2024    OOPHORECTOMY      [place] NEUROPLASTY &/TRANSPOS MEDIAN NRV CARPAL TUNNE Right 11/14/2019    Procedure: RELEASE CARPAL TUNNEL;  Surgeon: Onesimo Tate MD;  [facility];  Service: Orthopedics    [place] TOTAL THYROID LOBECTOMY UNI W/WO ISTHMUSECTOMY Left 12/16/2020    Procedure: Left THYROID LOBECTOMY;  Surgeon: Jhony Bhatt MD;  [facility];  Service: ENT    RECTAL POLYPECTOMY      REPLACEMENT TOTAL KNEE Left     last assessed - 27Apr2015    TONSILLECTOMY      TOTAL ABDOMINAL HYSTERECTOMY      TUBAL LIGATION      US GUIDED THYROID BIOPSY  10/14/2020       OBJECTIVE:  Physical Exam  Vitals and nursing note reviewed.   Constitutional:       General: She is not in acute distress.     Appearance: Normal appearance. She is not ill-appearing, toxic-appearing or diaphoretic.   Cardiovascular:      Rate and Rhythm: Normal rate and regular rhythm.      Pulses: Normal pulses.   Pulmonary:      Effort: Pulmonary effort is normal. No respiratory distress.      Breath sounds: Normal breath sounds. No wheezing.   Abdominal:      General: Bowel sounds are normal.      Palpations: Abdomen is soft.   Musculoskeletal:      Right lower leg: Edema present.       Left lower leg: Edema present.      Comments: Right sided pleural drain intact, no drainage, no warmth, no surrounding erythema present.   2+ bilateral lower extremity edema   Skin:     General: Skin is warm and dry.      Findings: Erythema present.      Comments: Mild LLE erythema, no increased warmth present   Neurological:      Mental Status: She is alert. Mental status is at baseline.      Motor: No weakness.      Gait: Gait abnormal.   Psychiatric:         Mood and Affect: Mood normal.         Behavior: Behavior normal.         Thought Content: Thought content normal.         Pertinent labs & Imaging reviewed:     JENNY Monroy  Geriatric Medicine  08/28/2024

## 2024-08-28 NOTE — TELEPHONE ENCOUNTER
Received call from Pastor christensen [facility] requesting follow up for Order # [id number] sent 7/30; signed on 8/2  (dated 8/2 in Media tab). Please fax again to #[phone]

## 2024-08-28 NOTE — TELEPHONE ENCOUNTER
Saranya MACIAS [facility]. Reporting edema on left lower extremity and hot to touch .Please f/u and advise. Thank you in advance.

## 2024-09-03 ENCOUNTER — TELEPHONE (OUTPATIENT)
Dept: FAMILY MEDICINE CLINIC | Facility: CLINIC | Age: 79
End: 2024-09-03

## 2024-09-03 NOTE — TELEPHONE ENCOUNTER
Received faxed forms from [facility] forms scanned blank form into media.  Order # [id number]  Looks like medication list has been updated   Forms placed in  folder

## 2024-09-04 ENCOUNTER — PATIENT OUTREACH (OUTPATIENT)
Dept: CASE MANAGEMENT | Facility: OTHER | Age: 79
End: 2024-09-04

## 2024-09-04 NOTE — PROGRESS NOTES
Update obtained from [facility] the patient has a LCD of 9/5/24 and will transition to Private pay 9/6/24. Patient will discharge to Home possible [facility]ain[facility] with [facility]. This Admin Coordinator will continue to monitor via chart review.

## 2024-09-05 ENCOUNTER — NURSING HOME VISIT (OUTPATIENT)
Dept: GERIATRICS | Facility: OTHER | Age: 79
End: 2024-09-05
Payer: MEDICARE

## 2024-09-05 ENCOUNTER — TELEPHONE (OUTPATIENT)
Dept: FAMILY MEDICINE CLINIC | Facility: CLINIC | Age: 79
End: 2024-09-05

## 2024-09-05 DIAGNOSIS — I50.32 CHRONIC HEART FAILURE WITH PRESERVED EJECTION FRACTION (HCC): Chronic | ICD-10-CM

## 2024-09-05 DIAGNOSIS — I10 ESSENTIAL HYPERTENSION: ICD-10-CM

## 2024-09-05 DIAGNOSIS — F51.01 PRIMARY INSOMNIA: Primary | ICD-10-CM

## 2024-09-05 DIAGNOSIS — R26.2 AMBULATORY DYSFUNCTION: ICD-10-CM

## 2024-09-05 PROBLEM — R05.9 COUGH: Status: RESOLVED | Noted: 2024-01-25 | Resolved: 2024-09-05

## 2024-09-05 PROCEDURE — 99309 SBSQ NF CARE MODERATE MDM 30: CPT | Performed by: NURSE PRACTITIONER

## 2024-09-06 NOTE — PROGRESS NOTES
[facility] at [address]  [place]affer  [id number] [phone]    SNF Rehab: POS 31  Progress Note     ASSESSMENT AND PLAN:  1. Primary insomnia  Assessment & Plan:  Patient takes ambien 10 mg at hs at home  Continue home dose  No adverse effects reported. Recommend GDR outpatient if tolerated.    Continue good sleep hygiene  2. Chronic heart failure with preserved ejection fraction (HCC)  Assessment & Plan:  Patient with no increased sob or weight gain.  Increased bilateral lower extremity edema present  Continue bumex daily; will consider extra dose of bumex  Continue metoprolol  Doppler results reviewed with negative results  Monitor weights and volume status  3. Essential hypertension  Assessment & Plan:  BP trend reviewed; sbp on teens to 120s on average.   Continue metoprolol  Continue bumex  Will monitor electrolytes and renal function  4. Ambulatory dysfunction  Assessment & Plan:  Multifactorial  Continue PT/OT/ST services in SNF rehab  Maintain fall and safety precautions  Continue nutritional and psychosocial support.       Name: Farnaz Martin                 : 1945               Sex: female    HPI:  Patient evaluated today in SNF rehab to follow-up on acute and chronic medical conditions. Upon examination, patient is awake, alert and in no acute distress. She is reported to have increased bilateral lower extremity edema with mild erythema of LLE.  No signs/symptoms of infection reported. Venous doppler result negative for acute DVT. Refer to assessment and plan for further HPI. The following portions of the patient's history were reviewed and updated as appropriate: allergies, current medications, past family history, past medical history, past social history, past surgical history and problem list.    ROS: Review of Systems   Constitutional:  Negative for chills and fever.   Respiratory:  Negative for chest tightness and shortness of breath.     Cardiovascular:  Positive for leg swelling. Negative for chest pain and palpitations.   Gastrointestinal:  Negative for abdominal pain.   Genitourinary:  Negative for difficulty urinating and dysuria.   Musculoskeletal:  Positive for gait problem. Negative for arthralgias and myalgias.   Neurological:  Negative for dizziness and headaches.   Psychiatric/Behavioral:  Positive for sleep disturbance.        Allergies   Allergen Reactions    Sotalol Other (See Comments)     Prolonged QTc leading to torsades de pointes     Penicillins Other (See Comments)     As a child calcium deposit in the arm     Ace Inhibitors GI Intolerance     Did feel well on it    Omeprazole Abdominal Pain, Rash and Vomiting     stomach pain, vomiting, rash       Medications:    Current Outpatient Medications on File Prior to Visit   Medication Sig Dispense Refill    albuterol (2.5 mg/3 mL) 0.083 % nebulizer solution Take 3 mL (2.5 mg total) by nebulization 4 (four) times a day      albuterol (ProAir HFA) 90 mcg/act inhaler Inhale 2 puffs every 6 (six) hours as needed for wheezing 8.5 g 3    apixaban (ELIQUIS) 5 mg Take 1 tablet (5 mg total) by mouth 2 (two) times a day 60 tablet 3    ascorbic Acid (VITAMIN C) 500 MG CPCR Take 500 mg by mouth daily      benzonatate (TESSALON PERLES) 100 mg capsule Take 1 capsule (100 mg total) by mouth 3 (three) times a day as needed for cough 20 capsule 0    bumetanide (BUMEX) 2 mg tablet Take 1 tablet (2 mg total) by mouth daily 30 tablet 1    Cetirizine HCl (ZyrTEC Allergy) 10 MG CAPS Take 10 mg by mouth in the morning      Cholecalciferol 50 MCG (2000 UT) CAPS Take 2,000 Units by mouth 2 (two) times a day      dapagliflozin 5 MG TABS Take 1 tablet (5 mg total) by mouth daily 30 tablet 5    diltiazem (CARDIZEM CD) 120 mg 24 hr capsule Take 120 mg by mouth daily      ezetimibe (ZETIA) 10 mg tablet Take 1 tablet (10 mg total) by mouth daily at bedtime 30 tablet 0    famotidine (PEPCID) 20 mg tablet Take 20 mg  by mouth 2 (two) times a day M, W, F in the AM      flecainide (TAMBOCOR) 150 MG tablet Take 1 tablet (150 mg total) by mouth 2 (two) times a day 30 tablet 0    gabapentin (NEURONTIN) 300 mg capsule take 1 capsule by mouth at bedtime 90 capsule 1    glucose blood (OneTouch Verio) test strip Check blood sugars once daily. Please substitute with appropriate alternative as covered by patient's insurance. Dx: E11.65 100 each 3    Insulin Glargine Solostar (Lantus SoloStar) 100 UNIT/ML SOPN Inject 0.1 mL (10 Units total) under the skin daily at bedtime 15 mL 1    Insulin Pen Needle 31G X 4 MM MISC Use daily at bedtime 100 each 2    ipratropium (ATROVENT) 0.03 % nasal spray 2 sprays into each nostril 3 (three) times a day Begin once per day, then progress to twice per day. Progress to three times a day as tolerated. 90 mL 3    metoprolol succinate (TOPROL-XL) 25 mg 24 hr tablet take 3 tablets by mouth every 12 hours 540 tablet 2    OneTouch Delica Lancets 33G MISC Check blood sugars once daily. Please substitute with appropriate alternative as covered by patient's insurance. Dx: E11.65 100 each 3    rOPINIRole (REQUIP) 1 mg tablet Take 2 tablets (2 mg total) by mouth daily at bedtime 180 tablet 1    tiotropium (Spiriva Respimat) 2.5 MCG/ACT AERS inhaler Inhale 2 puffs daily 4 g 2    TURMERIC PO Take 1 capsule by mouth 2 (two) times a day      zolpidem (AMBIEN) 10 mg tablet Take 1 tablet (10 mg total) by mouth daily at bedtime 14 tablet 0     No current facility-administered medications on file prior to visit.       History:  Past Medical History:   Diagnosis Date    Actinic keratosis     last assessed - 06Jun2014    Arthritis     Atrial fibrillation with rapid ventricular response (HCC)     last assessed - 26Apr2016    Atrial fibrillation with rapid ventricular response (HCC) 04/19/2016    Basal cell carcinoma     Benign colon polyp     last assessed - 27Apr2015    Bronchiectasis without complication (HCC)     CHF  (congestive heart failure) (HCC) 06/2022    Chronic diastolic CHF (congestive heart failure) (HCC)     Disease of thyroid gland     Effusion of knee joint right     Resolved - [id number]    Esophageal reflux     Fibromyalgia     last assessed - [id number]    Fibromyalgia, primary     GERD (gastroesophageal reflux disease)     Hyperlipidemia     Hypertension     Hyponatremia     Malignant neoplasm of thyroid gland (HCC)     Meningioma (HCC)     MGUS (monoclonal gammopathy of unknown significance)     Palpitations     last assessed - [id number]    Peroneal tendonitis, right     last assessed - [id number]    Right lumbar radiculopathy 03/17/2016    Thyroid cancer (HCC)     Thyroid nodule     Trochanteric bursitis of left hip 03/09/2018     Past Surgical History:   Procedure Laterality Date    CARDIAC ELECTROPHYSIOLOGY STUDY AND ABLATION  08/2022    CATARACT EXTRACTION Bilateral     COLONOSCOPY  03/2018    COLONOSCOPY W/ POLYPECTOMY  2021    repeat in 5 years    EYE SURGERY      IR PLEURAL EFFUSION LONG-TERM CATHETER PLACEMENT  8/19/2024    IR THORACENTESIS  7/27/2024    IR THORACENTESIS  8/13/2024    OOPHORECTOMY      AR NEUROPLASTY &/TRANSPOS MEDIAN NRV CARPAL TUNNE Right 11/14/2019    Procedure: RELEASE CARPAL TUNNEL;  Surgeon: Onesimo Tate MD;  [facility]: [facility];  Service: Orthopedics    AR TOTAL THYROID LOBECTOMY UNI W/WO ISTHMUSECTOMY Left 12/16/2020    Procedure: Left THYROID LOBECTOMY;  Surgeon: Jhony Bhatt MD;  [facility];  Service: ENT    RECTAL POLYPECTOMY      REPLACEMENT TOTAL KNEE Left     last assessed - 27Apr2015    TONSILLECTOMY      TOTAL ABDOMINAL HYSTERECTOMY      TUBAL LIGATION      US GUIDED THYROID BIOPSY  10/14/2020     Family History   Problem Relation Age of Onset    Heart disease Mother     Diabetes Mother     Heart disease Father     Coronary artery disease Father     Stroke Father         cerebrovascular accident    Heart attack Father         myocardial infarction    Sudden death  Father         scd    Other Family         Back disorder    Coronary artery disease Family     Neuropathy Family     Osteoporosis Family     No Known Problems Daughter     No Known Problems Maternal Grandmother     No Known Problems Maternal Grandfather     No Known Problems Paternal Grandmother     No Known Problems Paternal Grandfather     Cancer Maternal Uncle     Breast cancer Maternal Aunt 65    No Known Problems Son     No Known Problems Maternal Aunt     No Known Problems Maternal Aunt     No Known Problems Maternal Aunt     No Known Problems Paternal Aunt     No Known Problems Paternal Aunt     Anuerysm Neg Hx     Clotting disorder Neg Hx     Arrhythmia Neg Hx     Heart failure Neg Hx      Social History     Socioeconomic History    Marital status:      Spouse name: Not on file    Number of children: Not on file    Years of education: Not on file    Highest education level: Not on file   Occupational History    Not on file   Tobacco Use    Smoking status: Never    Smokeless tobacco: Never   Vaping Use    Vaping status: Never Used   Substance and Sexual Activity    Alcohol use: Not Currently    Drug use: Never    Sexual activity: Not Currently   Other Topics Concern    Not on file   Social History Narrative    ** Merged History Encounter **          Social Determinants of Health     Financial Resource Strain: Patient Unable To Answer (12/19/2023)    Overall Financial Resource Strain (CARDIA)     Difficulty of Paying Living Expenses: Patient unable to answer   Food Insecurity: No Food Insecurity (7/25/2024)    Hunger Vital Sign     Worried About Running Out of Food in the Last Year: Never true     Ran Out of Food in the Last Year: Never true   Transportation Needs: No Transportation Needs (7/25/2024)    PRAPARE - Transportation     Lack of Transportation (Medical): No     Lack of Transportation (Non-Medical): No   Physical Activity: Not on file   Stress: Not on file   Social Connections: Not on file    Intimate Partner Violence: Not At Risk (7/31/2023)    [facility]    Humiliation, Afraid, Rape, and Kick questionnaire     Fear of Current or Ex-Partner: No     Emotionally Abused: No     Physically Abused: No     Sexually Abused: No   Housing Stability: Low Risk  (7/25/2024)    Housing Stability Vital Sign     Unable to Pay for Housing in the Last Year: No     Number of Times Moved in the Last Year: 0     Homeless in the Last Year: No     Past Surgical History:   Procedure Laterality Date    CARDIAC ELECTROPHYSIOLOGY STUDY AND ABLATION  08/2022    CATARACT EXTRACTION Bilateral     COLONOSCOPY  03/2018    COLONOSCOPY W/ POLYPECTOMY  2021    repeat in 5 years    EYE SURGERY      IR PLEURAL EFFUSION LONG-TERM CATHETER PLACEMENT  8/19/2024    IR THORACENTESIS  7/27/2024    IR THORACENTESIS  8/13/2024    OOPHORECTOMY      CO NEUROPLASTY &/TRANSPOS MEDIAN NRV CARPAL TUNNE Right 11/14/2019    Procedure: RELEASE CARPAL TUNNEL;  Surgeon: Onesimo Tate MD;  [facility];  Service: Orthopedics    CO TOTAL THYROID LOBECTOMY UNI W/WO ISTHMUSECTOMY Left 12/16/2020    Procedure: Left THYROID LOBECTOMY;  Surgeon: Jhony Bhatt MD;  [facility];  Service: ENT    RECTAL POLYPECTOMY      REPLACEMENT TOTAL KNEE Left     last assessed - 27Apr2015    TONSILLECTOMY      TOTAL ABDOMINAL HYSTERECTOMY      TUBAL LIGATION      US GUIDED THYROID BIOPSY  10/14/2020       OBJECTIVE:  Physical Exam  Vitals and nursing note reviewed.   Constitutional:       General: She is not in acute distress.     Appearance: Normal appearance. She is not ill-appearing, toxic-appearing or diaphoretic.   Cardiovascular:      Rate and Rhythm: Normal rate and regular rhythm.      Pulses: Normal pulses.   Pulmonary:      Effort: Pulmonary effort is normal. No respiratory distress.      Breath sounds: Normal breath sounds. No wheezing.   Abdominal:      General: Bowel sounds are normal.       Palpations: Abdomen is soft.   Musculoskeletal:      Right lower leg: Edema present.      Left lower leg: Edema present.      Comments: Right sided pleural drain intact, no drainage, no warmth, no surrounding erythema present.   2+ bilateral lower extremity edema   Skin:     General: Skin is warm and dry.      Findings: Erythema present.      Comments: Mild LLE erythema, no increased warmth present   Neurological:      Mental Status: She is alert. Mental status is at baseline.      Motor: No weakness.      Gait: Gait abnormal.   Psychiatric:         Mood and Affect: Mood normal.         Behavior: Behavior normal.         Thought Content: Thought content normal.         Pertinent labs & Imaging reviewed:     JENNY Monroy  Geriatric Medicine  09/05/2024

## 2024-09-07 NOTE — PROGRESS NOTES
Heart Failure Outpatient Progress Note - Farnaz Martin 79 y.o. female MRN: [id number]    @ Encounter: [id number]      Assessment/Plan:    Patient Active Problem List    Diagnosis Date Noted    Palliative care patient 08/13/2024    Shortness of breath 08/05/2024    Thyroid nodule 07/27/2024    Acute kidney injury superimposed on CKD (chronic kidney disease) stage 3, GFR 30-59 ml/min ([facility]) 07/24/2024    Fatigue 07/24/2024    Recurrent pleural effusion on right 07/14/2024    Syncope and collapse 07/13/2024    Fall 07/13/2024    Type 2 diabetes mellitus with microalbuminuria, without long-term current use of insulin ([facility]) 07/11/2024    Paroxysmal A-fib ([facility]) 05/26/2024    Ambulatory dysfunction 05/24/2024    Hyponatremia 05/24/2024    Left renal mass 04/22/2024    Bronchiectasis without complication ([facility]) 01/25/2024    Multiple thyroid nodules 01/13/2024    Abnormal CT of the chest 08/04/2023    S/P revision of total knee, left 07/31/2023    Nonrheumatic mitral valve regurgitation 07/03/2023    Acute on chronic heart failure ([facility]) 06/13/2022    Elevated serum creatinine 06/13/2022    Meningioma ([facility]) 05/13/2022    Chronic tension-type headache, not intractable 03/29/2022    Vasomotor rhinitis 12/16/2021    MGUS (monoclonal gammopathy of unknown significance) 06/28/2021    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    Tremors of nervous system 04/01/2021    Hx of diplopia 04/01/2021    S/P placement of cardiac pacemaker 03/24/2021    History of sick sinus syndrome s/p PPM 03/2021    Current use of long term anticoagulation 02/04/2021    Malignant neoplasm of thyroid gland ([facility]) 01/11/2021    Chronic heart failure with preserved ejection fraction ([facility]) 01/03/2021    Goals of care, counseling/discussion 12/09/2020    Chronic rhinitis 11/10/2020    Arthritis of both acromioclavicular joints 03/06/2020    Carpal tunnel syndrome on right 11/01/2019    Osteoarthritis of shoulder     TMJ syndrome 10/18/2017    Essential  hypertension 04/19/2016    Peripheral neuropathy 03/07/2016    Lumbar spondylosis 06/29/2015    Lumbar stenosis 06/29/2015    Restless legs syndrome 07/09/2014    Allergic rhinitis 04/01/2013    Osteoarthritis of knee 04/01/2013    Insomnia 01/04/2013    Osteopenia 11/26/2012    Fibromyalgia 10/16/2012    Hyperlipidemia 10/16/2012    Osteoarthrosis, hand 10/16/2012    Vitamin D deficiency 10/16/2012    Sensorineural hearing loss (SNHL) of both ears 08/21/2024     Recurrent pleural effusion  -S/P right tunneled pleural catheter 8/19/24  -draining 200-400 cc daily    Chronic HFpEF  -Diuretic Bumex 2 mg daily  -compensated on this diuretic regimen.   -continue 2 g Na diet and 2 l fluid restriction  -please call our office for any rapid weight changes.   -Repeat BMP again in one month.     Weight at discharge 160 lbs, 160 lbs, 159 lbs at discharge, today, 151 lbs.        TTE 7/14/24:LVEF 50%, RV dilated, LA dilated, midl mR, mod TR, est PASP 50 mmhg  TTE 5/25/24: LVEF 55%, mod to severe MR, mild to mod TR, est RAP 10 mmHg,       PAF/flutter/atrial tachycardia  - history of atrial fibrillation and flutter ablation 2022  Rhythm- flecainide 150 mg BID  Rate Diltiazem 120 mg daily, Toprol 75 mg twice daily  OAC Eliquis 5mg BID     Dual chamber Medtronic PPM  MDT DC PPM 6/17/24:  AP-56%, -68% (>40% DDIR@60PPM). ALL LEAD PARAMETERS WITHIN NORMAL LIMITS. 60 AT/AF EPISODES & CURRENTLY IN AT/AFLUTTER. AT/AF BURDEN SINCE 5/24/24-2.4%. PT ON ELIQUIS, DILTIAZEM, FLECAINIDE & METOPROLOL. PT SEEN BY CHRISS DUVAL. NORMAL DEVICE FUNCTION     Chronic hyponatremia-sodium runs 128-134. Currently 135  QTc prolongation/torsades with sotalol  Bronchiectasis. Follows with pulm.    Falls- recurrent  H/O thyroid cancer  Renal mass. Currently undergoing workup      HPI:   78-year-old female with past medical history described above who presents for hospital follow-up with her daughter.  He was originally admitted on 5/20 at the [facility] with palpitations due to atrial tachycardia.  She was placed on flecainide 150 mg twice daily.  She then presented back to the [facility] after a fall at home.  Also felt to be volume overloaded and diagnosed with bronchiectasis.  He was treated with IV diuretics and steroids respectively.  Device interrogation showed her to be in normal sinus rhythm with no significant events.  She was discharged home on her usual dose of Lasix 20 mg daily.  Her flecainide dose was also reduced to 100 mg twice daily given concern for side effects on higher dosing. She is feeling well with exception of fatigue. Had a few break through palpitations since discharge but nothing sustained. Otherwise no other concerns or symptoms. She will likely need surgical intervention of a renal mass over next few weeks to months and will need cardiac risk assessment.     06/17/2024: Patient presents for follow-up, and patient's daughter, Coral, on speaker phone throughout visit. Patient continues with intermittent palpitations lasting a few minutes at a time with no associated symptoms. Denies lightheadedness, CP, diaphoresis, SOB at rest, PND, and orthopnea. Reports some lower BP readings at home (SBP in low 90-110s).      She presented to the ED on 7/13/2024 after a fall or possible syncopal event while attempting to use the bathroom.  She has poor recall of the events surrounding her fall.  She believes she was on the ground for approximately 6 hours before help arrived. Traumatic imaging negative for acute injury.  CXR showed mild interstitial edema and moderate right and small left pleural effusion.  CT chest showed right pleural effusion, global cardiomegaly, right greater than left with dilated hepatic veins and IVC suggesting right heart failure.  She was noted to have an KIMBERLEY for which she received IV fluids with improvement in her sodium and creatinine today.  High-sensitivity troponins negative.  Total CK 68.  There were  concerns for possible bradycardia however device interrogation shows normal device function.  Cardiology consulted for further evaluation management of her possible syncopal event. Underwent thoracentesis of right pleural effusion. Lasix dose increased to 40 mg QD prior to discharge.      7/22/24: presents today for follow up with her daughter. No cardiac complaints today, but reports ongoing fatigue. Denies chest pain, palpitations, leg swelling, JOHNSON.     9/10/24: presents for hospital follow-up.  Has had multiple admissions since last office visit, last of which was for CHF exacerbation with recurrent pleural effusion.  She is feeling quite well today. States she is draining between 200-400 cc from her pleurex catheter daily. She is getting back to her some regular activity and tolerating well. Appetite is excellent. Sleeping well.   Past Medical History:   Diagnosis Date    Actinic keratosis     last assessed - 06Jun2014    Arthritis     Atrial fibrillation with rapid ventricular response (HCC)     last assessed - 26Apr2016    Atrial fibrillation with rapid ventricular response (HCC) 04/19/2016    Basal cell carcinoma     Benign colon polyp     last assessed - 27Apr2015    Bronchiectasis without complication (HCC)     CHF (congestive heart failure) (HCC) 06/2022    Chronic diastolic CHF (congestive heart failure) (HCC)     Disease of thyroid gland     Effusion of knee joint right     Resolved - 19Apr2016    Esophageal reflux     Fibromyalgia     last assessed - [id number]    Fibromyalgia, primary     GERD (gastroesophageal reflux disease)     Hyperlipidemia     Hypertension     Hyponatremia     Malignant neoplasm of thyroid gland (HCC)     Meningioma (HCC)     MGUS (monoclonal gammopathy of unknown significance)     Palpitations     last assessed - 30Apr2013    Peroneal tendonitis, right     last assessed - [id number]    Right lumbar radiculopathy 03/17/2016    Thyroid cancer (HCC)     Thyroid nodule      Trochanteric bursitis of left hip 03/09/2018       12 point ROS negative other than that stated in HPI    Allergies   Allergen Reactions    Sotalol Other (See Comments)     Prolonged QTc leading to torsades de pointes     Penicillins Other (See Comments)     As a child calcium deposit in the arm     Ace Inhibitors GI Intolerance     Did feel well on it    Omeprazole Abdominal Pain, Rash and Vomiting     stomach pain, vomiting, rash     .    Current Outpatient Medications:     albuterol (2.5 mg/3 mL) 0.083 % nebulizer solution, Take 3 mL (2.5 mg total) by nebulization 4 (four) times a day, Disp: , Rfl:     albuterol (ProAir HFA) 90 mcg/act inhaler, Inhale 2 puffs every 6 (six) hours as needed for wheezing, Disp: 8.5 g, Rfl: 3    apixaban (ELIQUIS) 5 mg, Take 1 tablet (5 mg total) by mouth 2 (two) times a day, Disp: 60 tablet, Rfl: 3    ascorbic Acid (VITAMIN C) 500 MG CPCR, Take 500 mg by mouth daily, Disp: , Rfl:     benzonatate (TESSALON PERLES) 100 mg capsule, Take 1 capsule (100 mg total) by mouth 3 (three) times a day as needed for cough, Disp: 20 capsule, Rfl: 0    bumetanide (BUMEX) 2 mg tablet, Take 1 tablet (2 mg total) by mouth daily, Disp: 30 tablet, Rfl: 1    Cetirizine HCl (ZyrTEC Allergy) 10 MG CAPS, Take 10 mg by mouth in the morning, Disp: , Rfl:     Cholecalciferol 50 MCG (2000 UT) CAPS, Take 2,000 Units by mouth 2 (two) times a day, Disp: , Rfl:     dapagliflozin 5 MG TABS, Take 1 tablet (5 mg total) by mouth daily, Disp: 30 tablet, Rfl: 5    diltiazem (CARDIZEM CD) 120 mg 24 hr capsule, Take 120 mg by mouth daily, Disp: , Rfl:     ezetimibe (ZETIA) 10 mg tablet, Take 1 tablet (10 mg total) by mouth daily at bedtime, Disp: 30 tablet, Rfl: 0    famotidine (PEPCID) 20 mg tablet, Take 20 mg by mouth 2 (two) times a day M, W, F in the AM, Disp: , Rfl:     flecainide (TAMBOCOR) 150 MG tablet, Take 1 tablet (150 mg total) by mouth 2 (two) times a day, Disp: 30 tablet, Rfl: 0    gabapentin (NEURONTIN) 300  mg capsule, take 1 capsule by mouth at bedtime, Disp: 90 capsule, Rfl: 1    glucose blood (OneTouch Verio) test strip, Check blood sugars once daily. Please substitute with appropriate alternative as covered by patient's insurance. Dx: E11.65, Disp: 100 each, Rfl: 3    Insulin Glargine Solostar (Lantus SoloStar) 100 UNIT/ML SOPN, Inject 0.1 mL (10 Units total) under the skin daily at bedtime, Disp: 15 mL, Rfl: 1    Insulin Pen Needle 31G X 4 MM MISC, Use daily at bedtime, Disp: 100 each, Rfl: 2    ipratropium (ATROVENT) 0.03 % nasal spray, 2 sprays into each nostril 3 (three) times a day Begin once per day, then progress to twice per day. Progress to three times a day as tolerated. (Patient taking differently: 2 sprays into each nostril if needed Begin once per day, then progress to twice per day. Progress to three times a day as tolerated.), Disp: 90 mL, Rfl: 3    metoprolol succinate (TOPROL-XL) 25 mg 24 hr tablet, take 3 tablets by mouth every 12 hours, Disp: 540 tablet, Rfl: 2    OneTouch Delica Lancets 33G MISC, Check blood sugars once daily. Please substitute with appropriate alternative as covered by patient's insurance. Dx: E11.65, Disp: 100 each, Rfl: 3    rOPINIRole (REQUIP) 1 mg tablet, Take 2 tablets (2 mg total) by mouth daily at bedtime, Disp: 180 tablet, Rfl: 1    tiotropium (Spiriva Respimat) 2.5 MCG/ACT AERS inhaler, Inhale 2 puffs daily, Disp: 4 g, Rfl: 2    TURMERIC PO, Take 1 capsule by mouth 2 (two) times a day (Patient not taking: Reported on 8/6/2024), Disp: , Rfl:     zolpidem (AMBIEN) 10 mg tablet, Take 1 tablet (10 mg total) by mouth daily at bedtime, Disp: 14 tablet, Rfl: 0    Social History     Socioeconomic History    Marital status:      Spouse name: Not on file    Number of children: Not on file    Years of education: Not on file    Highest education level: Not on file   Occupational History    Not on file   Tobacco Use    Smoking status: Never    Smokeless tobacco: Never    Vaping Use    Vaping status: Never Used   Substance and Sexual Activity    Alcohol use: Not Currently    Drug use: Never    Sexual activity: Not Currently   Other Topics Concern    Not on file   Social History Narrative    ** Merged History Encounter **          Social Determinants of Health     Financial Resource Strain: Patient Unable To Answer (12/19/2023)    Overall Financial Resource Strain (CARDIA)     Difficulty of Paying Living Expenses: Patient unable to answer   Food Insecurity: No Food Insecurity (7/25/2024)    Hunger Vital Sign     Worried About Running Out of Food in the Last Year: Never true     Ran Out of Food in the Last Year: Never true   Transportation Needs: No Transportation Needs (7/25/2024)    PRAPARE - Transportation     Lack of Transportation (Medical): No     Lack of Transportation (Non-Medical): No   Physical Activity: Not on file   Stress: Not on file   Social Connections: Not on file   Intimate Partner Violence: Not At Risk (7/31/2023)    [facility]    Humiliation, Afraid, Rape, and Kick questionnaire     Fear of Current or Ex-Partner: No     Emotionally Abused: No     Physically Abused: No     Sexually Abused: No   Housing Stability: Low Risk  (7/25/2024)    Housing Stability Vital Sign     Unable to Pay for Housing in the Last Year: No     Number of Times Moved in the Last Year: 0     Homeless in the Last Year: No       Family History   Problem Relation Age of Onset    Heart disease Mother     Diabetes Mother     Heart disease Father     Coronary artery disease Father     Stroke Father         cerebrovascular accident    Heart attack Father         myocardial infarction    Sudden death Father         scd    Other Family         Back disorder    Coronary artery disease Family     Neuropathy Family     Osteoporosis Family     No Known Problems Daughter     No Known Problems Maternal Grandmother     No Known Problems Maternal  "Grandfather     No Known Problems Paternal Grandmother     No Known Problems Paternal Grandfather     Cancer Maternal Uncle     Breast cancer Maternal Aunt 65    No Known Problems Son     No Known Problems Maternal Aunt     No Known Problems Maternal Aunt     No Known Problems Maternal Aunt     No Known Problems Paternal Aunt     No Known Problems Paternal Aunt     Anuerysm Neg Hx     Clotting disorder Neg Hx     Arrhythmia Neg Hx     Heart failure Neg Hx        Physical Exam:  LMP 02/01/1990 (Within Weeks)   /70 (BP Location: Left arm, Patient Position: Sitting, Cuff Size: Standard)   Pulse 62 Comment: Manual  Ht 5' 2\" (1.575 m)   Wt 68.5 kg (151 lb)   LMP 02/01/1990 (Within Weeks)   BMI 27.62 kg/m²     Wt Readings from Last 3 Encounters:   08/23/24 70.3 kg (155 lb)   08/21/24 70.2 kg (154 lb 12.8 oz)   08/20/24 69.5 kg (153 lb 3.5 oz)           GEN: Farnaz Martin appears well, alert and oriented x 3, pleasant and cooperative   HEENT: pupils equal, round, and reactive to light; extraocular muscles intact  NECK: supple, no carotid bruits   HEART: regular rhythm, normal S1 and S2, no murmurs, clicks, gallops or rubs, JVP is flat   LUNGS: clear, right upper lobe slightly diminished   ABDOMEN: normal bowel sounds, soft, no tenderness, no distention  EXTREMITIES: peripheral pulses normal; no clubbing, cyanosis, or edema  NEURO: no focal findings   SKIN: normal without suspicious lesions on exposed skin    Labs & Results:  Lab Results   Component Value Date    SODIUM 135 09/06/2024    K 4.4 09/06/2024    CL 97 (L) 09/06/2024    CO2 28 09/06/2024    BUN 28 (H) 09/06/2024    CREATININE 1.29 (H) 09/06/2024    GLUC 105 (H) 09/06/2024    CALCIUM 9.0 09/06/2024     This SmartLink has not been configured with any valid records.      Lab Results   Component Value Date    WBC 6.07 08/20/2024    HGB 11.0 (L) 08/20/2024    HCT 35.4 08/20/2024    MCV 89 08/20/2024     08/20/2024     Lab Results   Component Value " Date    BNP 1,091 (H) 08/06/2024      Lab Results   Component Value Date    LDLCALC 40 07/25/2024     Lab Results   Component Value Date    VDP9DGITNPSI 3.416 07/26/2024     Lab Results   Component Value Date    HGBA1C 8.8 (H) 07/13/2024         EKG personally reviewed by JENNY Fry.   No results found for this visit on 09/10/24.     Counseling / Coordination of Care  Total face to face time spent with patient 20 minutes.  An additional 10 minutes was spent for chart/data review and visit preparation.       Thank you for the opportunity to participate in the care of this patient.    JENNY Fry

## 2024-09-09 ENCOUNTER — PATIENT OUTREACH (OUTPATIENT)
Dept: CASE MANAGEMENT | Facility: OTHER | Age: 79
End: 2024-09-09

## 2024-09-09 NOTE — PROGRESS NOTES
Update obtained from [facility] the patient transitioned to LTC at [facility]. I have removed myself from the care team, updated the Care Coordination note, and closed the episode.

## 2024-09-10 ENCOUNTER — OFFICE VISIT (OUTPATIENT)
Dept: CARDIOLOGY CLINIC | Facility: CLINIC | Age: 79
End: 2024-09-10
Payer: MEDICARE

## 2024-09-10 VITALS
HEIGHT: 62 IN | WEIGHT: 151 LBS | DIASTOLIC BLOOD PRESSURE: 70 MMHG | BODY MASS INDEX: 27.79 KG/M2 | SYSTOLIC BLOOD PRESSURE: 112 MMHG | HEART RATE: 62 BPM

## 2024-09-10 DIAGNOSIS — I50.30 DIASTOLIC CONGESTIVE HEART FAILURE, UNSPECIFIED HF CHRONICITY (HCC): Primary | ICD-10-CM

## 2024-09-10 PROCEDURE — 99215 OFFICE O/P EST HI 40 MIN: CPT | Performed by: NURSE PRACTITIONER

## 2024-09-10 NOTE — PATIENT INSTRUCTIONS
Weigh yourself daily  If you gain 3 lbs in one day or 5 lbs in one week, please call the office at [phone] and ask for a nurse or the heart failure nurse  Keep your sodium intake to <2 grams, (2000 mg) per day, and fluids <2 Liters (2000 ml) per day. This is around 6-7, 8 oz glasses of fluid per day

## 2024-09-11 ENCOUNTER — NURSING HOME VISIT (OUTPATIENT)
Dept: GERIATRICS | Facility: OTHER | Age: 79
End: 2024-09-11
Payer: MEDICARE

## 2024-09-11 DIAGNOSIS — I50.32 CHRONIC HEART FAILURE WITH PRESERVED EJECTION FRACTION (HCC): Chronic | ICD-10-CM

## 2024-09-11 DIAGNOSIS — R60.0 BILATERAL LOWER EXTREMITY EDEMA: Primary | ICD-10-CM

## 2024-09-11 DIAGNOSIS — J90 RECURRENT PLEURAL EFFUSION ON RIGHT: ICD-10-CM

## 2024-09-11 PROCEDURE — 99309 SBSQ NF CARE MODERATE MDM 30: CPT | Performed by: NURSE PRACTITIONER

## 2024-09-12 NOTE — PROGRESS NOTES
[facility] at [address] [place]  [address]  [phone]    Acute Visit Note  LTC: POS 32    ASSESSMENT AND PLAN:  1. Bilateral lower extremity edema  Assessment & Plan:  History of lower extremity edema thought to be 2/2 to venous insufficiency vs medication side effect.  Medication review done, patient on gabapentin which may be contributing factor.   If pain remains controlled, will consider GDR of gabapentin.  Tubi- ordered  Continue leg elevation  Continue Bumex 2 mg daily  2. Chronic heart failure with preserved ejection fraction (HCC)  Assessment & Plan:  Patient with no increased sob or weight gain.  Increased bilateral lower extremity edema  Venous dopplers reviewed and reveals no acute DVT.   Continue bumex daily  Continue metoprolol  Monitor weights and volume status  3. Recurrent pleural effusion on right  Assessment & Plan:  History of right sided pleural effusion in the setting of bronchiectasis and pulmonary hypertension.  Status post thoracentesis inpatient with pleural drain placement.  Empty drain QOD  Monitor for signs/symptoms of infection      Name: Farnaz Martin                 : 1945               Sex: female    HPI:  Patient evaluated today in LTC to follow-up on acute and chronic medical conditions. Upon examination, patient is awake, alert and in no acute distress.  Refer to assessment and plan for further HPI. The following portions of the patient's history were reviewed and updated as appropriate: allergies, current medications, past family history, past medical history, past social history, past surgical history and problem list.    ROS: Review of Systems   Constitutional:  Negative for fatigue and fever.   Respiratory:  Positive for cough. Negative for chest tightness and shortness of breath.    Cardiovascular:  Positive for leg swelling.   Neurological:  Negative for weakness.       Allergies   Allergen Reactions     Sotalol Other (See Comments)     Prolonged QTc leading to torsades de pointes     Penicillins Other (See Comments)     As a child calcium deposit in the arm     Ace Inhibitors GI Intolerance     Did feel well on it    Omeprazole Abdominal Pain, Rash and Vomiting     stomach pain, vomiting, rash       Medications:    Current Outpatient Medications on File Prior to Visit   Medication Sig Dispense Refill    albuterol (2.5 mg/3 mL) 0.083 % nebulizer solution Take 3 mL (2.5 mg total) by nebulization 4 (four) times a day      albuterol (ProAir HFA) 90 mcg/act inhaler Inhale 2 puffs every 6 (six) hours as needed for wheezing 8.5 g 3    apixaban (ELIQUIS) 5 mg Take 1 tablet (5 mg total) by mouth 2 (two) times a day 60 tablet 3    ascorbic Acid (VITAMIN C) 500 MG CPCR Take 500 mg by mouth daily      benzonatate (TESSALON PERLES) 100 mg capsule Take 1 capsule (100 mg total) by mouth 3 (three) times a day as needed for cough 20 capsule 0    bumetanide (BUMEX) 2 mg tablet Take 1 tablet (2 mg total) by mouth daily 30 tablet 1    Cetirizine HCl (ZyrTEC Allergy) 10 MG CAPS Take 10 mg by mouth in the morning      Cholecalciferol 50 MCG (2000 UT) CAPS Take 2,000 Units by mouth 2 (two) times a day      dapagliflozin 5 MG TABS Take 1 tablet (5 mg total) by mouth daily 30 tablet 5    diltiazem (CARDIZEM CD) 120 mg 24 hr capsule Take 120 mg by mouth daily      ezetimibe (ZETIA) 10 mg tablet Take 1 tablet (10 mg total) by mouth daily at bedtime 30 tablet 0    famotidine (PEPCID) 20 mg tablet Take 20 mg by mouth 2 (two) times a day M, W, F in the AM      flecainide (TAMBOCOR) 150 MG tablet Take 1 tablet (150 mg total) by mouth 2 (two) times a day 30 tablet 0    gabapentin (NEURONTIN) 300 mg capsule take 1 capsule by mouth at bedtime 90 capsule 1    glucose blood (OneTouch Verio) test strip Check blood sugars once daily. Please substitute with appropriate alternative as covered by patient's insurance. Dx: E11.65 100 each 3    Insulin  Glargine Solostar (Lantus SoloStar) 100 UNIT/ML SOPN Inject 0.1 mL (10 Units total) under the skin daily at bedtime 15 mL 1    Insulin Pen Needle 31G X 4 MM MISC Use daily at bedtime 100 each 2    ipratropium (ATROVENT) 0.03 % nasal spray 2 sprays into each nostril 3 (three) times a day Begin once per day, then progress to twice per day. Progress to three times a day as tolerated. 90 mL 3    metoprolol succinate (TOPROL-XL) 25 mg 24 hr tablet take 3 tablets by mouth every 12 hours 540 tablet 2    OneTouch Delica Lancets 33G MISC Check blood sugars once daily. Please substitute with appropriate alternative as covered by patient's insurance. Dx: E11.65 100 each 3    rOPINIRole (REQUIP) 1 mg tablet Take 2 tablets (2 mg total) by mouth daily at bedtime 180 tablet 1    tiotropium (Spiriva Respimat) 2.5 MCG/ACT AERS inhaler Inhale 2 puffs daily 4 g 2    TURMERIC PO Take 1 capsule by mouth 2 (two) times a day      zolpidem (AMBIEN) 10 mg tablet Take 1 tablet (10 mg total) by mouth daily at bedtime 14 tablet 0     No current facility-administered medications on file prior to visit.       History:  Past Medical History:   Diagnosis Date    Actinic keratosis     last assessed - 06Jun2014    Arthritis     Atrial fibrillation with rapid ventricular response (HCC)     last assessed - 26Apr2016    Atrial fibrillation with rapid ventricular response (HCC) 04/19/2016    Basal cell carcinoma     Benign colon polyp     last assessed - 27Apr2015    Bronchiectasis without complication (HCC)     CHF (congestive heart failure) (HCC) 06/2022    Chronic diastolic CHF (congestive heart failure) (HCC)     Disease of thyroid gland     Effusion of knee joint right     Resolved - 19Apr2016    Esophageal reflux     Fibromyalgia     last assessed - 27Dec2017    Fibromyalgia, primary     GERD (gastroesophageal reflux disease)     Hyperlipidemia     Hypertension     Hyponatremia     Malignant neoplasm of thyroid gland (HCC)     Meningioma (HCC)      MGUS (monoclonal gammopathy of unknown significance)     Palpitations     last assessed - [id number]    Peroneal tendonitis, right     last assessed - [id number]    Right lumbar radiculopathy 03/17/2016    Thyroid cancer (HCC)     Thyroid nodule     Trochanteric bursitis of left hip 03/09/2018     Past Surgical History:   Procedure Laterality Date    CARDIAC ELECTROPHYSIOLOGY STUDY AND ABLATION  08/2022    CATARACT EXTRACTION Bilateral     COLONOSCOPY  03/2018    COLONOSCOPY W/ POLYPECTOMY  2021    repeat in 5 years    EYE SURGERY      IR PLEURAL EFFUSION LONG-TERM CATHETER PLACEMENT  8/19/2024    IR THORACENTESIS  7/27/2024    IR THORACENTESIS  8/13/2024    OOPHORECTOMY      ID NEUROPLASTY &/TRANSPOS MEDIAN NRV CARPAL TUNNE Right 11/14/2019    Procedure: RELEASE CARPAL TUNNEL;  Surgeon: Onesimo Tate MD;  [facility];  Service: Orthopedics    ID TOTAL THYROID LOBECTOMY UNI W/WO ISTHMUSECTOMY Left 12/16/2020    Procedure: Left THYROID LOBECTOMY;  Surgeon: Jhony Bhatt MD;  [facility];  Service: ENT    RECTAL POLYPECTOMY      REPLACEMENT TOTAL KNEE Left     last assessed - [id number]    TONSILLECTOMY      TOTAL ABDOMINAL HYSTERECTOMY      TUBAL LIGATION      US GUIDED THYROID BIOPSY  10/14/2020     Family History   Problem Relation Age of Onset    Heart disease Mother     Diabetes Mother     Heart disease Father     Coronary artery disease Father     Stroke Father         cerebrovascular accident    Heart attack Father         myocardial infarction    Sudden death Father         scd    Other Family         Back disorder    Coronary artery disease Family     Neuropathy Family     Osteoporosis Family     No Known Problems Daughter     No Known Problems Maternal Grandmother     No Known Problems Maternal Grandfather     No Known Problems Paternal Grandmother     No Known Problems Paternal Grandfather     Cancer Maternal Uncle     Breast cancer Maternal Aunt 65    No Known Problems Son     No Known  Problems Maternal Aunt     No Known Problems Maternal Aunt     No Known Problems Maternal Aunt     No Known Problems Paternal Aunt     No Known Problems Paternal Aunt     Anuerysm Neg Hx     Clotting disorder Neg Hx     Arrhythmia Neg Hx     Heart failure Neg Hx      Social History     Socioeconomic History    Marital status:      Spouse name: Not on file    Number of children: Not on file    Years of education: Not on file    Highest education level: Not on file   Occupational History    Not on file   Tobacco Use    Smoking status: Never    Smokeless tobacco: Never   Vaping Use    Vaping status: Never Used   Substance and Sexual Activity    Alcohol use: Not Currently    Drug use: Never    Sexual activity: Not Currently   Other Topics Concern    Not on file   Social History Narrative    ** Merged History Encounter **          Social Determinants of Health     Financial Resource Strain: Patient Unable To Answer (12/19/2023)    Overall Financial Resource Strain (CARDIA)     Difficulty of Paying Living Expenses: Patient unable to answer   Food Insecurity: No Food Insecurity (7/25/2024)    Hunger Vital Sign     Worried About Running Out of Food in the Last Year: Never true     Ran Out of Food in the Last Year: Never true   Transportation Needs: No Transportation Needs (7/25/2024)    PRAPARE - Transportation     Lack of Transportation (Medical): No     Lack of Transportation (Non-Medical): No   Physical Activity: Not on file   Stress: Not on file   Social Connections: Not on file   Intimate Partner Violence: Not At Risk (7/31/2023)    [facility]    Humiliation, Afraid, Rape, and Kick questionnaire     Fear of Current or Ex-Partner: No     Emotionally Abused: No     Physically Abused: No     Sexually Abused: No   Housing Stability: Low Risk  (7/25/2024)    Housing Stability Vital Sign     Unable to Pay for Housing in the Last Year: No     Number of Times  Moved in the Last Year: 0     Homeless in the Last Year: No     Past Surgical History:   Procedure Laterality Date    CARDIAC ELECTROPHYSIOLOGY STUDY AND ABLATION  08/2022    CATARACT EXTRACTION Bilateral     COLONOSCOPY  03/2018    COLONOSCOPY W/ POLYPECTOMY  2021    repeat in 5 years    EYE SURGERY      IR PLEURAL EFFUSION LONG-TERM CATHETER PLACEMENT  8/19/2024    IR THORACENTESIS  7/27/2024    IR THORACENTESIS  8/13/2024    OOPHORECTOMY      MI NEUROPLASTY &/TRANSPOS MEDIAN NRV CARPAL TUNNE Right 11/14/2019    Procedure: RELEASE CARPAL TUNNEL;  Surgeon: Onesimo Tate MD;  [facility];  Service: Orthopedics    MI TOTAL THYROID LOBECTOMY UNI W/WO ISTHMUSECTOMY Left 12/16/2020    Procedure: Left THYROID LOBECTOMY;  Surgeon: Jhony Bhatt MD;  [facility];  Service: ENT    RECTAL POLYPECTOMY      REPLACEMENT TOTAL KNEE Left     last assessed - 27Apr2015    TONSILLECTOMY      TOTAL ABDOMINAL HYSTERECTOMY      TUBAL LIGATION      US GUIDED THYROID BIOPSY  10/14/2020       OBJECTIVE:  Physical Exam  Vitals and nursing note reviewed.   Constitutional:       General: She is not in acute distress.     Appearance: Normal appearance. She is not ill-appearing, toxic-appearing or diaphoretic.   Cardiovascular:      Rate and Rhythm: Normal rate and regular rhythm.      Pulses: Normal pulses.   Pulmonary:      Effort: Pulmonary effort is normal.      Breath sounds: Normal breath sounds.   Abdominal:      General: Bowel sounds are normal.      Palpations: Abdomen is soft.   Musculoskeletal:         General: Normal range of motion.      Right lower leg: Edema present.      Left lower leg: Edema present.      Comments: +3 RLE pitting edema, +1 LLE pitting edema   Skin:     General: Skin is warm and dry.      Findings: No bruising or erythema.   Neurological:      Mental Status: She is alert. Mental status is at baseline.      Gait: Gait abnormal.   Psychiatric:         Mood and Affect: Mood normal.          Behavior: Behavior normal.         Thought Content: Thought content normal.       Labs & Imaging Reviewed:   Lab Results   Component Value Date    WBC 6.07 08/20/2024    HGB 11.0 (L) 08/20/2024    HCT 35.4 08/20/2024    MCV 89 08/20/2024     08/20/2024     Lab Results   Component Value Date    SODIUM 135 09/06/2024    K 4.4 09/06/2024    CL 97 (L) 09/06/2024    CO2 28 09/06/2024    BUN 28 (H) 09/06/2024    CREATININE 1.29 (H) 09/06/2024    GLUC 105 (H) 09/06/2024    CALCIUM 9.0 09/06/2024     Lab Results   Component Value Date    XAFCEQBH92 799 07/01/2022     Lab Results   Component Value Date    XRT3RIHOHVOB 3.416 07/26/2024     Lab Results   Component Value Date    CXDP01BDUTJB 41.3 05/06/2015        JENNY Monroy  Geriatric Medicine  09/11/2024

## 2024-09-15 ENCOUNTER — TELEPHONE (OUTPATIENT)
Dept: OTHER | Facility: OTHER | Age: 79
End: 2024-09-15

## 2024-09-15 NOTE — TELEPHONE ENCOUNTER
"Carl from [facility] stated, \" She had a fall and hit her face pretty hard. Nothing is broken but she is bleeding between the eyes. Is there anything we should get done for her.\"       Paged on call VIA EPIC   "

## 2024-09-16 ENCOUNTER — REMOTE DEVICE CLINIC VISIT (OUTPATIENT)
Dept: CARDIOLOGY CLINIC | Facility: CLINIC | Age: 79
End: 2024-09-16
Payer: MEDICARE

## 2024-09-16 ENCOUNTER — NURSING HOME VISIT (OUTPATIENT)
Dept: GERIATRICS | Facility: OTHER | Age: 79
End: 2024-09-16
Payer: MEDICARE

## 2024-09-16 DIAGNOSIS — M15.9 GENERALIZED OSTEOARTHRITIS OF HAND: ICD-10-CM

## 2024-09-16 DIAGNOSIS — Z95.0 PRESENCE OF CARDIAC PACEMAKER: ICD-10-CM

## 2024-09-16 DIAGNOSIS — I48.0 PAROXYSMAL A-FIB (HCC): ICD-10-CM

## 2024-09-16 DIAGNOSIS — R07.81 RIB PAIN ON LEFT SIDE: ICD-10-CM

## 2024-09-16 DIAGNOSIS — R26.2 AMBULATORY DYSFUNCTION: Primary | ICD-10-CM

## 2024-09-16 DIAGNOSIS — Z86.79 HISTORY OF SICK SINUS SYNDROME: Primary | Chronic | ICD-10-CM

## 2024-09-16 PROCEDURE — 93296 REM INTERROG EVL PM/IDS: CPT | Performed by: STUDENT IN AN ORGANIZED HEALTH CARE EDUCATION/TRAINING PROGRAM

## 2024-09-16 PROCEDURE — 99309 SBSQ NF CARE MODERATE MDM 30: CPT | Performed by: NURSE PRACTITIONER

## 2024-09-16 PROCEDURE — 93294 REM INTERROG EVL PM/LDLS PM: CPT | Performed by: STUDENT IN AN ORGANIZED HEALTH CARE EDUCATION/TRAINING PROGRAM

## 2024-09-16 NOTE — PROGRESS NOTES
Results for orders placed or performed in visit on 09/16/24   Cardiac EP device report    Narrative    MDT-DUAL CHAMBER PPM (DDDR MODE)/ ACTIVE SYSTEM IS MRI CONDITIONAL  CARELINK TRANSMISSION: BATTERY VOLTAGE ADEQUATE (7.6 YRS). AP 49%  97.2% (>40% SSS DDDR 60).  ALL AVAILABLE LEAD PARAMETERS WITHIN NORMAL LIMITS.  NO NEW SIGNIFICANT HIGH RATE EPISODES.  PT TAKING ELIQUIS, DILTIAZEM, METOPROLOL SUCC,  EF 50% (ECHO 7/14/2024).  NORMAL DEVICE FUNCTION. PAS        
no

## 2024-09-16 NOTE — TELEPHONE ENCOUNTER
"Spoke to patient and she has gone through two bouts of antibiotics from her PCP for urinary symptoms. She is currently completed the regimen and is still experiencing symptoms. She is having increased urinary frequency, urgency and voiding little amounts, she does not feel empty. She denies any fevers. Slight discomfort in abdomen. She would like Srikanth or Monica. [facility] has an opening tomorrow, please provide if that time slot is appropriate since she is a new patient to the group.     Answer Assessment - Initial Assessment Questions  1. SYMPTOM: \"What's the main symptom you're concerned about?\" (e.g., frequency, incontinence)      Urinary frequency and urgency and voiding little amounts   2. ONSET: \"When did the  symptoms   start?\"      End of January  3. PAIN: \"Is there any pain?\" If Yes, ask: \"How bad is it?\" (Scale: 1-10; mild, moderate, severe)      5  4. CAUSE: \"What do you think is causing the symptoms?\"      H/O urinary tract infection   5. OTHER SYMPTOMS: \"Do you have any other symptoms?\" (e.g., fever, flank pain, blood in urine, pain with urination)      No blood no fevers burning with urination    Protocols used: Urinary Symptoms-ADULT-[place]    " Addressed in a separate encounter

## 2024-09-16 NOTE — PROGRESS NOTES
[facility] at [address] [place]  [address]  [phone]    Acute Visit Note  LTC: POS 32    ASSESSMENT AND PLAN:  1. Ambulatory dysfunction  Assessment & Plan:  Status post fall on 09/15/2024 with left hand and left rib pain  Imaging of left hand and chest ordered and reviewed, shows no acute fracture of left ribs and left hand.  Continue assistance with ADLs  Maintain fall and safety precautions  2. Rib pain on left side  Assessment & Plan:  Status post fall with left rib and left hand pain  CXR and left hand x-ray shows no fractures  Continue tylenol prn for pain. Patient states that she prefers to ask for medication when she needs it.   4% Lidocaine patch ordered to left ribs daily, off at HS.   3. Generalized osteoarthritis of hand  Assessment & Plan:  No acute fracture on left hand x-ray identified   Mild osteoarthritis present  Continue pain management with prn tylenol  Continue vitamin D and dietary calcium intake  Exercise recommended as tolerated    Name: Farnaz Martin                 : 1945               Sex: female    HPI:    79-year-old female patient seen and examined today for left rib and left hand pain. She is status post unwitnessed fall on . She was found sitting on the floor facing the door. She states that she tripped on a cord and hit her head, landing on her left side. She is currently calm, cooperative and in no acute distress. She rates the pain as mild currently, states that last night her pain was severe.     The following portions of the patient's history were reviewed and updated as appropriate: allergies, current medications, past family history, past medical history, past social history, past surgical history and problem list.    ROS: Review of Systems   Respiratory:  Negative for chest tightness, shortness of breath and wheezing.    Cardiovascular:  Negative for chest pain and palpitations.   Gastrointestinal:   Negative for abdominal pain.   Musculoskeletal:  Positive for arthralgias and myalgias. Negative for gait problem.        Left rib pain   Neurological:  Negative for dizziness and headaches.       Allergies   Allergen Reactions    Sotalol Other (See Comments)     Prolonged QTc leading to torsades de pointes     Penicillins Other (See Comments)     As a child calcium deposit in the arm     Ace Inhibitors GI Intolerance     Did feel well on it    Omeprazole Abdominal Pain, Rash and Vomiting     stomach pain, vomiting, rash       Medications:    No current facility-administered medications on file prior to visit.     Current Outpatient Medications on File Prior to Visit   Medication Sig Dispense Refill    albuterol (2.5 mg/3 mL) 0.083 % nebulizer solution Take 3 mL (2.5 mg total) by nebulization 4 (four) times a day      albuterol (ProAir HFA) 90 mcg/act inhaler Inhale 2 puffs every 6 (six) hours as needed for wheezing 8.5 g 3    apixaban (ELIQUIS) 5 mg Take 1 tablet (5 mg total) by mouth 2 (two) times a day 60 tablet 3    ascorbic Acid (VITAMIN C) 500 MG CPCR Take 500 mg by mouth daily      benzonatate (TESSALON PERLES) 100 mg capsule Take 1 capsule (100 mg total) by mouth 3 (three) times a day as needed for cough 20 capsule 0    bumetanide (BUMEX) 2 mg tablet Take 1 tablet (2 mg total) by mouth daily 30 tablet 1    Cetirizine HCl (ZyrTEC Allergy) 10 MG CAPS Take 5 mg by mouth in the morning      Cholecalciferol 50 MCG (2000 UT) CAPS Take 2,000 Units by mouth 2 (two) times a day      dapagliflozin 5 MG TABS Take 1 tablet (5 mg total) by mouth daily 30 tablet 5    diltiazem (CARDIZEM CD) 120 mg 24 hr capsule Take 120 mg by mouth daily      ezetimibe (ZETIA) 10 mg tablet Take 1 tablet (10 mg total) by mouth daily at bedtime 30 tablet 0    famotidine (PEPCID) 20 mg tablet Take 20 mg by mouth daily M, W, F in the AM      flecainide (TAMBOCOR) 150 MG tablet Take 1 tablet (150 mg total) by mouth 2 (two) times a day 30  tablet 0    gabapentin (NEURONTIN) 300 mg capsule take 1 capsule by mouth at bedtime 90 capsule 1    glucose blood (OneTouch Verio) test strip Check blood sugars once daily. Please substitute with appropriate alternative as covered by patient's insurance. Dx: E11.65 100 each 3    Insulin Glargine Solostar (Lantus SoloStar) 100 UNIT/ML SOPN Inject 0.1 mL (10 Units total) under the skin daily at bedtime 15 mL 1    Insulin Pen Needle 31G X 4 MM MISC Use daily at bedtime 100 each 2    ipratropium (ATROVENT) 0.03 % nasal spray 2 sprays into each nostril 3 (three) times a day Begin once per day, then progress to twice per day. Progress to three times a day as tolerated. 90 mL 3    metoprolol succinate (TOPROL-XL) 25 mg 24 hr tablet take 3 tablets by mouth every 12 hours 540 tablet 2    OneTouch Delica Lancets 33G MISC Check blood sugars once daily. Please substitute with appropriate alternative as covered by patient's insurance. Dx: E11.65 100 each 3    rOPINIRole (REQUIP) 1 mg tablet Take 2 tablets (2 mg total) by mouth daily at bedtime 180 tablet 1    tiotropium (Spiriva Respimat) 2.5 MCG/ACT AERS inhaler Inhale 2 puffs daily 4 g 2    TURMERIC PO Take 1 capsule by mouth 2 (two) times a day      zolpidem (AMBIEN) 10 mg tablet Take 1 tablet (10 mg total) by mouth daily at bedtime 14 tablet 0       History:  Past Medical History:   Diagnosis Date    Actinic keratosis     last assessed - [id number]    Arthritis     Atrial fibrillation with rapid ventricular response (HCC)     last assessed - 26Apr2016    Atrial fibrillation with rapid ventricular response (HCC) 04/19/2016    Basal cell carcinoma     Benign colon polyp     last assessed - 27Apr2015    Bronchiectasis without complication (HCC)     CHF (congestive heart failure) (HCC) 06/2022    Chronic diastolic CHF (congestive heart failure) (HCC)     Disease of thyroid gland     Effusion of knee joint right     Resolved - 19Apr2016    Esophageal reflux     Fibromyalgia      last assessed - [id number]    Fibromyalgia, primary     GERD (gastroesophageal reflux disease)     Hyperlipidemia     Hypertension     Hyponatremia     Malignant neoplasm of thyroid gland (HCC)     Meningioma (HCC)     MGUS (monoclonal gammopathy of unknown significance)     Palpitations     last assessed - [id number]    Peroneal tendonitis, right     last assessed - [id number]    Right lumbar radiculopathy 03/17/2016    Thyroid cancer (HCC)     Thyroid nodule     Trochanteric bursitis of left hip 03/09/2018     Past Surgical History:   Procedure Laterality Date    CARDIAC ELECTROPHYSIOLOGY STUDY AND ABLATION  08/2022    CATARACT EXTRACTION Bilateral     COLONOSCOPY  03/2018    COLONOSCOPY W/ POLYPECTOMY  2021    repeat in 5 years    EYE SURGERY      IR PLEURAL EFFUSION LONG-TERM CATHETER PLACEMENT  8/19/2024    IR THORACENTESIS  7/27/2024    IR THORACENTESIS  8/13/2024    OOPHORECTOMY      IL NEUROPLASTY &/TRANSPOS MEDIAN NRV CARPAL TUNNE Right 11/14/2019    Procedure: RELEASE CARPAL TUNNEL;  Surgeon: Onesimo Tate MD;  [facility]: [facility];  Service: Orthopedics    IL TOTAL THYROID LOBECTOMY UNI W/WO ISTHMUSECTOMY Left 12/16/2020    Procedure: Left THYROID LOBECTOMY;  Surgeon: Jhony Bhatt MD;  Location: [facility];  Service: ENT    RECTAL POLYPECTOMY      REPLACEMENT TOTAL KNEE Left     last assessed - [id number]    TONSILLECTOMY      TOTAL ABDOMINAL HYSTERECTOMY      TUBAL LIGATION      US GUIDED THYROID BIOPSY  10/14/2020     Family History   Problem Relation Age of Onset    Heart disease Mother     Diabetes Mother     Heart disease Father     Coronary artery disease Father     Stroke Father         cerebrovascular accident    Heart attack Father         myocardial infarction    Sudden death Father         scd    Other Family         Back disorder    Coronary artery disease Family     Neuropathy Family     Osteoporosis Family     No Known Problems Daughter     No Known Problems Maternal Grandmother     No Known  Problems Maternal Grandfather     No Known Problems Paternal Grandmother     No Known Problems Paternal Grandfather     Cancer Maternal Uncle     Breast cancer Maternal Aunt 65    No Known Problems Son     No Known Problems Maternal Aunt     No Known Problems Maternal Aunt     No Known Problems Maternal Aunt     No Known Problems Paternal Aunt     No Known Problems Paternal Aunt     Anuerysm Neg Hx     Clotting disorder Neg Hx     Arrhythmia Neg Hx     Heart failure Neg Hx      Social History     Socioeconomic History    Marital status:      Spouse name: Not on file    Number of children: Not on file    Years of education: Not on file    Highest education level: Not on file   Occupational History    Not on file   Tobacco Use    Smoking status: Never    Smokeless tobacco: Never   Vaping Use    Vaping status: Never Used   Substance and Sexual Activity    Alcohol use: Not Currently    Drug use: Never    Sexual activity: Not Currently   Other Topics Concern    Not on file   Social History Narrative    ** Merged History Encounter **          Social Determinants of Health     Financial Resource Strain: Patient Unable To Answer (12/19/2023)    Overall Financial Resource Strain (CARDIA)     Difficulty of Paying Living Expenses: Patient unable to answer   Food Insecurity: No Food Insecurity (7/25/2024)    Hunger Vital Sign     Worried About Running Out of Food in the Last Year: Never true     Ran Out of Food in the Last Year: Never true   Transportation Needs: No Transportation Needs (7/25/2024)    PRAPARE - Transportation     Lack of Transportation (Medical): No     Lack of Transportation (Non-Medical): No   Physical Activity: Not on file   Stress: Not on file   Social Connections: Not on file   Intimate Partner Violence: Not At Risk (7/31/2023)    [facility]    Humiliation, Afraid, Rape, and Kick questionnaire     Fear of Current or Ex-Partner: No      Emotionally Abused: No     Physically Abused: No     Sexually Abused: No   Housing Stability: Low Risk  (7/25/2024)    Housing Stability Vital Sign     Unable to Pay for Housing in the Last Year: No     Number of Times Moved in the Last Year: 0     Homeless in the Last Year: No     Past Surgical History:   Procedure Laterality Date    CARDIAC ELECTROPHYSIOLOGY STUDY AND ABLATION  08/2022    CATARACT EXTRACTION Bilateral     COLONOSCOPY  03/2018    COLONOSCOPY W/ POLYPECTOMY  2021    repeat in 5 years    EYE SURGERY      IR PLEURAL EFFUSION LONG-TERM CATHETER PLACEMENT  8/19/2024    IR THORACENTESIS  7/27/2024    IR THORACENTESIS  8/13/2024    OOPHORECTOMY      ND NEUROPLASTY &/TRANSPOS MEDIAN NRV CARPAL TUNNE Right 11/14/2019    Procedure: RELEASE CARPAL TUNNEL;  Surgeon: Onesimo Tate MD;  [facility];  Service: Orthopedics    ND TOTAL THYROID LOBECTOMY UNI W/WO ISTHMUSECTOMY Left 12/16/2020    Procedure: Left THYROID LOBECTOMY;  Surgeon: Jhony Bhatt MD;  [facility];  Service: ENT    RECTAL POLYPECTOMY      REPLACEMENT TOTAL KNEE Left     last assessed - 27Apr2015    TONSILLECTOMY      TOTAL ABDOMINAL HYSTERECTOMY      TUBAL LIGATION      US GUIDED THYROID BIOPSY  10/14/2020       OBJECTIVE:  Physical Exam  Vitals and nursing note reviewed.   Constitutional:       General: She is not in acute distress.     Appearance: She is not ill-appearing, toxic-appearing or diaphoretic.   Cardiovascular:      Rate and Rhythm: Normal rate and regular rhythm.      Pulses: Normal pulses.   Pulmonary:      Effort: Pulmonary effort is normal. No respiratory distress.      Breath sounds: Normal breath sounds. No wheezing, rhonchi or rales.   Musculoskeletal:         General: Tenderness present.      Comments: Right sided pleural drain intact  Left lateral rib pain upon palpation   Skin:     General: Skin is warm and dry.      Findings: No bruising or erythema.      Comments: Abrasion of nose   Neurological:       General: No focal deficit present.      Mental Status: She is alert and oriented to person, place, and time. Mental status is at baseline.      Cranial Nerves: No cranial nerve deficit.      Sensory: No sensory deficit.      Motor: No weakness.      Gait: Gait normal.   Psychiatric:         Mood and Affect: Mood normal.         Behavior: Behavior normal.         Thought Content: Thought content normal.         Judgment: Judgment normal.         Labs & Imaging Reviewed in Epic and Facility EMR.       JENNY Monroy  Geriatric Medicine  09/16/2024

## 2024-09-17 NOTE — ASSESSMENT & PLAN NOTE
Patient with no increased sob or weight gain.  Increased bilateral lower extremity edema present  Continue bumex daily; will consider extra dose of bumex  Continue metoprolol  Will order bilateral lower extremity venous dopplers to rule out acute DVT.  Monitor weights and volume status

## 2024-09-17 NOTE — ASSESSMENT & PLAN NOTE
History of right sided pleural effusion in the setting of bronchiectasis and pulmonary hypertension.  Status post thoracentesis on 07/15/2024   Chest xray on 7/22/24 showed new small right sided pleural effusion  Status post right thoracentesis with removal of 1.2 Liters of fluid  Patient was taking lasix 40 mg po twice daily then transitioned to torsemide 10 mg daily inpatient due to declining renal function  Will monitor respiratory status closely

## 2024-09-17 NOTE — ASSESSMENT & PLAN NOTE
BP soft today at 106/58  Continue metoprolol, will add hold parameters  Continue bumex  Will monitor electrolytes and renal function

## 2024-09-17 NOTE — ASSESSMENT & PLAN NOTE
Baseline Cr. 0.9-1.11  Cr 08/01 1.25  Encourage adequate hydration  Avoid nephrotoxins and hypotension. Will add hold parameters to metoprolol.   Will monitor BMP

## 2024-09-17 NOTE — ASSESSMENT & PLAN NOTE
Multifactorial  Continue PT/OT/ST services in SNF rehab  Maintain fall and safety precautions  Continue nutritional and psychosocial support.

## 2024-09-17 NOTE — ASSESSMENT & PLAN NOTE
Patient takes ambien 10 mg at hs at home  Continue home dose  Monitor for adverse effects.   Continue good sleep hygiene

## 2024-09-17 NOTE — ASSESSMENT & PLAN NOTE
History of right sided pleural effusion in the setting of bronchiectasis and pulmonary hypertension.  Status post thoracentesis inpatient with pleural drain placement.  Empty drain daily until 09/04 then QOD  Monitor for signs/symptoms of infection

## 2024-09-17 NOTE — ASSESSMENT & PLAN NOTE
History of lower extremity edema thought to be 2/2 to venous insufficiency vs medication side effect.  Medication review done, patient on gabapentin which may be contributing factor.   If pain remains controlled, will consider GDR of gabapentin.  Tubi- ordered  Continue leg elevation  Continue Bumex 2 mg daily

## 2024-09-17 NOTE — ASSESSMENT & PLAN NOTE
History of right sided pleural effusion in the setting of bronchiectasis and pulmonary hypertension.  Status post thoracentesis inpatient with pleural drain placement.  Empty drain QOD  Monitor for signs/symptoms of infection

## 2024-09-17 NOTE — ASSESSMENT & PLAN NOTE
Sodium level baseline 128-134 inpatient  Sodium currently 134  Continue adequate hydration  Will monitor BMP

## 2024-09-17 NOTE — ASSESSMENT & PLAN NOTE
BP trend reviewed; sbp on teens to 120s on average.   Continue metoprolol  Continue bumex  Will monitor electrolytes and renal function

## 2024-09-17 NOTE — ASSESSMENT & PLAN NOTE
Patient with no increased sob or weight gain.  Bilateral lower extremity edema present  Continue bumex daily  Continue metoprolol  Monitor weights and volume status

## 2024-09-17 NOTE — ASSESSMENT & PLAN NOTE
Patient with no increased sob or weight gain.  Increased bilateral lower extremity edema  Venous dopplers reviewed and reveals no acute DVT.   Continue bumex daily  Continue metoprolol  Monitor weights and volume status

## 2024-09-17 NOTE — ASSESSMENT & PLAN NOTE
Sodium level baseline 128-134 inpatient  Sodium currently 138  Continue adequate hydration  Will monitor BMP

## 2024-09-18 ENCOUNTER — TELEPHONE (OUTPATIENT)
Dept: OTHER | Facility: OTHER | Age: 79
End: 2024-09-18

## 2024-09-18 ENCOUNTER — NURSING HOME VISIT (OUTPATIENT)
Dept: GERIATRICS | Facility: OTHER | Age: 79
End: 2024-09-18
Payer: MEDICARE

## 2024-09-18 DIAGNOSIS — R05.1 ACUTE COUGH: ICD-10-CM

## 2024-09-18 DIAGNOSIS — R07.81 RIB PAIN ON LEFT SIDE: Primary | ICD-10-CM

## 2024-09-18 DIAGNOSIS — R26.81 GAIT INSTABILITY: ICD-10-CM

## 2024-09-18 PROCEDURE — 99310 SBSQ NF CARE HIGH MDM 45: CPT | Performed by: NURSE PRACTITIONER

## 2024-09-18 NOTE — ASSESSMENT & PLAN NOTE
Increased left side rib pain s/p fall several days ago  Chest x-ray negative, will order more focused imaging of the left ribs to rule out fracture.  Left rib study ordered stat  Will order low dose oxycodone, 2.5 mg po every 6 hours prn moderate to severe pain times 2 weeks.  Continue scheduled tylenol and lidocaine patch.

## 2024-09-18 NOTE — PROGRESS NOTES
[facility] at [address] [place]  [address]  [phone]    Acute Visit Note  LTC: POS 32    ASSESSMENT AND PLAN:  1. Rib pain on left side  Assessment & Plan:  Increased left side rib pain s/p fall several days ago  Chest x-ray negative, will order more focused imaging of the left ribs to rule out fracture.  Left rib study ordered stat  Will order low dose oxycodone, 2.5 mg po every 6 hours prn moderate to severe pain times 2 weeks.  Continue scheduled tylenol and lidocaine patch.   2. Acute cough  Assessment & Plan:  Acute on chronic cough  Guaifenesin cough medication ordered every 6 hours times 3 days  Continue tessalon perles prn  Will order stat COVIC-19 PCR   CBC with diff/CMP in the am.   3. Gait instability  Assessment & Plan:  Increases weakness and gait instability reported today in the setting of recent fall with left rib pain.  See above orders.  Encourage adequate PO hydration  Maintain fall precautions.       Name: Farnaz Martin                 : 1945               Sex: female    HPI:  Patient evaluated today in SNF rehab per nursing request for increased left sided rib pain unrelieved with scheduled tylenol and lidocaine patch. The on-call provider was notified last night and a one time dose or ibuprofen was ordered. Patient also complains of a cough that is aggravating her pain.  The following portions of the patient's history were reviewed and updated as appropriate: allergies, current medications, past family history, past medical history, past social history, past surgical history and problem list.    ROS: Review of Systems   Constitutional:  Negative for fatigue and fever.   Respiratory:  Positive for cough. Negative for chest tightness, shortness of breath and wheezing.    Cardiovascular:  Negative for chest pain.   Musculoskeletal:  Positive for arthralgias, gait problem and myalgias.        Left sided rib pain   Neurological:   Positive for weakness. Negative for dizziness and headaches.       Allergies   Allergen Reactions    Sotalol Other (See Comments)     Prolonged QTc leading to torsades de pointes     Penicillins Other (See Comments)     As a child calcium deposit in the arm     Ace Inhibitors GI Intolerance     Did feel well on it    Omeprazole Abdominal Pain, Rash and Vomiting     stomach pain, vomiting, rash       Medications:    Current Outpatient Medications on File Prior to Visit   Medication Sig Dispense Refill    albuterol (2.5 mg/3 mL) 0.083 % nebulizer solution Take 3 mL (2.5 mg total) by nebulization 4 (four) times a day      albuterol (ProAir HFA) 90 mcg/act inhaler Inhale 2 puffs every 6 (six) hours as needed for wheezing 8.5 g 3    apixaban (ELIQUIS) 5 mg Take 1 tablet (5 mg total) by mouth 2 (two) times a day 60 tablet 3    ascorbic Acid (VITAMIN C) 500 MG CPCR Take 500 mg by mouth daily      benzonatate (TESSALON PERLES) 100 mg capsule Take 1 capsule (100 mg total) by mouth 3 (three) times a day as needed for cough 20 capsule 0    bumetanide (BUMEX) 2 mg tablet Take 1 tablet (2 mg total) by mouth daily 30 tablet 1    Cetirizine HCl (ZyrTEC Allergy) 10 MG CAPS Take 10 mg by mouth in the morning      Cholecalciferol 50 MCG (2000 UT) CAPS Take 2,000 Units by mouth 2 (two) times a day      dapagliflozin 5 MG TABS Take 1 tablet (5 mg total) by mouth daily 30 tablet 5    diltiazem (CARDIZEM CD) 120 mg 24 hr capsule Take 120 mg by mouth daily      ezetimibe (ZETIA) 10 mg tablet Take 1 tablet (10 mg total) by mouth daily at bedtime 30 tablet 0    famotidine (PEPCID) 20 mg tablet Take 20 mg by mouth 2 (two) times a day M, W, F in the AM      flecainide (TAMBOCOR) 150 MG tablet Take 1 tablet (150 mg total) by mouth 2 (two) times a day 30 tablet 0    gabapentin (NEURONTIN) 300 mg capsule take 1 capsule by mouth at bedtime 90 capsule 1    glucose blood (OneTouch Verio) test strip Check blood sugars once daily. Please substitute  with appropriate alternative as covered by patient's insurance. Dx: E11.65 100 each 3    Insulin Glargine Solostar (Lantus SoloStar) 100 UNIT/ML SOPN Inject 0.1 mL (10 Units total) under the skin daily at bedtime 15 mL 1    Insulin Pen Needle 31G X 4 MM MISC Use daily at bedtime 100 each 2    ipratropium (ATROVENT) 0.03 % nasal spray 2 sprays into each nostril 3 (three) times a day Begin once per day, then progress to twice per day. Progress to three times a day as tolerated. 90 mL 3    metoprolol succinate (TOPROL-XL) 25 mg 24 hr tablet take 3 tablets by mouth every 12 hours 540 tablet 2    OneTouch Delica Lancets 33G MISC Check blood sugars once daily. Please substitute with appropriate alternative as covered by patient's insurance. Dx: E11.65 100 each 3    rOPINIRole (REQUIP) 1 mg tablet Take 2 tablets (2 mg total) by mouth daily at bedtime 180 tablet 1    tiotropium (Spiriva Respimat) 2.5 MCG/ACT AERS inhaler Inhale 2 puffs daily 4 g 2    TURMERIC PO Take 1 capsule by mouth 2 (two) times a day      zolpidem (AMBIEN) 10 mg tablet Take 1 tablet (10 mg total) by mouth daily at bedtime 14 tablet 0     No current facility-administered medications on file prior to visit.       History:  Past Medical History:   Diagnosis Date    Actinic keratosis     last assessed - 06Jun2014    Arthritis     Atrial fibrillation with rapid ventricular response (HCC)     last assessed - 26Apr2016    Atrial fibrillation with rapid ventricular response (HCC) 04/19/2016    Basal cell carcinoma     Benign colon polyp     last assessed - 27Apr2015    Bronchiectasis without complication (HCC)     CHF (congestive heart failure) (HCC) 06/2022    Chronic diastolic CHF (congestive heart failure) (HCC)     Disease of thyroid gland     Effusion of knee joint right     Resolved - 19Apr2016    Esophageal reflux     Fibromyalgia     last assessed - 27Dec2017    Fibromyalgia, primary     GERD (gastroesophageal reflux disease)     Hyperlipidemia      Hypertension     Hyponatremia     Malignant neoplasm of thyroid gland (HCC)     Meningioma (HCC)     MGUS (monoclonal gammopathy of unknown significance)     Palpitations     last assessed - [id number]    Peroneal tendonitis, right     last assessed - [id number]    Right lumbar radiculopathy 03/17/2016    Thyroid cancer (HCC)     Thyroid nodule     Trochanteric bursitis of left hip 03/09/2018     Past Surgical History:   Procedure Laterality Date    CARDIAC ELECTROPHYSIOLOGY STUDY AND ABLATION  08/2022    CATARACT EXTRACTION Bilateral     COLONOSCOPY  03/2018    COLONOSCOPY W/ POLYPECTOMY  2021    repeat in 5 years    EYE SURGERY      IR PLEURAL EFFUSION LONG-TERM CATHETER PLACEMENT  8/19/2024    IR THORACENTESIS  7/27/2024    IR THORACENTESIS  8/13/2024    OOPHORECTOMY      [place] NEUROPLASTY &/TRANSPOS MEDIAN NRV CARPAL TUNNE Right 11/14/2019    Procedure: RELEASE CARPAL TUNNEL;  Surgeon: Onesimo Tate MD;  [facility]: [facility];  Service: Orthopedics    NY TOTAL THYROID LOBECTOMY UNI W/WO ISTHMUSECTOMY Left 12/16/2020    Procedure: Left THYROID LOBECTOMY;  Surgeon: Jhony Bhatt MD;  Location: [facility];  Service: ENT    RECTAL POLYPECTOMY      REPLACEMENT TOTAL KNEE Left     last assessed - [id number]    TONSILLECTOMY      TOTAL ABDOMINAL HYSTERECTOMY      TUBAL LIGATION      US GUIDED THYROID BIOPSY  10/14/2020     Family History   Problem Relation Age of Onset    Heart disease Mother     Diabetes Mother     Heart disease Father     Coronary artery disease Father     Stroke Father         cerebrovascular accident    Heart attack Father         myocardial infarction    Sudden death Father         scd    Other Family         Back disorder    Coronary artery disease Family     Neuropathy Family     Osteoporosis Family     No Known Problems Daughter     No Known Problems Maternal Grandmother     No Known Problems Maternal Grandfather     No Known Problems Paternal Grandmother     No Known Problems Paternal Grandfather      Cancer Maternal Uncle     Breast cancer Maternal Aunt 65    No Known Problems Son     No Known Problems Maternal Aunt     No Known Problems Maternal Aunt     No Known Problems Maternal Aunt     No Known Problems Paternal Aunt     No Known Problems Paternal Aunt     Anuerysm Neg Hx     Clotting disorder Neg Hx     Arrhythmia Neg Hx     Heart failure Neg Hx      Social History     Socioeconomic History    Marital status:      Spouse name: Not on file    Number of children: Not on file    Years of education: Not on file    Highest education level: Not on file   Occupational History    Not on file   Tobacco Use    Smoking status: Never    Smokeless tobacco: Never   Vaping Use    Vaping status: Never Used   Substance and Sexual Activity    Alcohol use: Not Currently    Drug use: Never    Sexual activity: Not Currently   Other Topics Concern    Not on file   Social History Narrative    ** Merged History Encounter **          Social Determinants of Health     Financial Resource Strain: Patient Unable To Answer (12/19/2023)    Overall Financial Resource Strain (CARDIA)     Difficulty of Paying Living Expenses: Patient unable to answer   Food Insecurity: No Food Insecurity (7/25/2024)    Hunger Vital Sign     Worried About Running Out of Food in the Last Year: Never true     Ran Out of Food in the Last Year: Never true   Transportation Needs: No Transportation Needs (7/25/2024)    PRAPARE - Transportation     Lack of Transportation (Medical): No     Lack of Transportation (Non-Medical): No   Physical Activity: Not on file   Stress: Not on file   Social Connections: Not on file   Intimate Partner Violence: Not At Risk (7/31/2023)    [facility]    Humiliation, Afraid, Rape, and Kick questionnaire     Fear of Current or Ex-Partner: No     Emotionally Abused: No     Physically Abused: No     Sexually Abused: No   Housing Stability: Low Risk  (7/25/2024)     Housing Stability Vital Sign     Unable to Pay for Housing in the Last Year: No     Number of Times Moved in the Last Year: 0     Homeless in the Last Year: No     Past Surgical History:   Procedure Laterality Date    CARDIAC ELECTROPHYSIOLOGY STUDY AND ABLATION  08/2022    CATARACT EXTRACTION Bilateral     COLONOSCOPY  03/2018    COLONOSCOPY W/ POLYPECTOMY  2021    repeat in 5 years    EYE SURGERY      IR PLEURAL EFFUSION LONG-TERM CATHETER PLACEMENT  8/19/2024    IR THORACENTESIS  7/27/2024    IR THORACENTESIS  8/13/2024    OOPHORECTOMY      NE NEUROPLASTY &/TRANSPOS MEDIAN NRV CARPAL TUNNE Right 11/14/2019    Procedure: RELEASE CARPAL TUNNEL;  Surgeon: Onesimo Tate MD;  [facility];  Service: Orthopedics    NE TOTAL THYROID LOBECTOMY UNI W/WO ISTHMUSECTOMY Left 12/16/2020    Procedure: Left THYROID LOBECTOMY;  Surgeon: Jhony Bhatt MD;  [facility];  Service: ENT    RECTAL POLYPECTOMY      REPLACEMENT TOTAL KNEE Left     last assessed - 27Apr2015    TONSILLECTOMY      TOTAL ABDOMINAL HYSTERECTOMY      TUBAL LIGATION      US GUIDED THYROID BIOPSY  10/14/2020       OBJECTIVE:  Vital Signs: /52, Temp 97.8, HR 69, RR 18, SpO2 94% RA, Wgt: 156.2 lbs   Physical Exam  Constitutional:       Appearance: Normal appearance.   HENT:      Right Ear: External ear normal.      Left Ear: External ear normal.      Nose: Congestion and rhinorrhea present.   Cardiovascular:      Rate and Rhythm: Normal rate and regular rhythm.      Pulses: Normal pulses.   Pulmonary:      Effort: Pulmonary effort is normal. No respiratory distress.      Breath sounds: Normal breath sounds. No wheezing, rhonchi or rales.   Musculoskeletal:         General: Tenderness present.      Comments: Left sided rib pain upon palpation.   Skin:     General: Skin is warm and dry.   Neurological:      Mental Status: She is alert. Mental status is at baseline.      Gait: Gait abnormal.   Psychiatric:         Mood and Affect: Mood normal.          Behavior: Behavior normal.         Thought Content: Thought content normal.         Labs & Imaging:  Lab Results   Component Value Date    WBC 6.07 08/20/2024    HGB 11.0 (L) 08/20/2024    HCT 35.4 08/20/2024    MCV 89 08/20/2024     08/20/2024     Lab Results   Component Value Date    SODIUM 135 09/06/2024    K 4.4 09/06/2024    CL 97 (L) 09/06/2024    CO2 28 09/06/2024    BUN 28 (H) 09/06/2024    CREATININE 1.29 (H) 09/06/2024    GLUC 105 (H) 09/06/2024    CALCIUM 9.0 09/06/2024     Lab Results   Component Value Date    WMZSRCYT80 799 07/01/2022     Lab Results   Component Value Date    LYD2MLIMSXKZ 3.416 07/26/2024     Lab Results   Component Value Date    LVDB20RBABQL 41.3 05/06/2015        JENNY Monroy  Geriatric Medicine  09/18/2024

## 2024-09-18 NOTE — ASSESSMENT & PLAN NOTE
Acute on chronic cough  Guaifenesin cough medication ordered every 6 hours times 3 days  Continue tessalon perles prn  Will order stat COVIC-19 PCR   CBC with diff/CMP in the am.

## 2024-09-18 NOTE — TELEPHONE ENCOUNTER
Venecia MACIAS, called from [facility] stating that she has not received a message from the oncall provider and that she had spoke to someone earlier. Upon chart review noted that Venecia spoke with Elvie during previous call. Communicated with Elvie that Venecia called back and that she is requesting the provider be paged again. On call provider was messaged by Elvie at 4:29 and messaged was read by provider. Elvie sent another message per request of COLLEEN. COLLEEN Cuadra requesting a call at [phone].

## 2024-09-18 NOTE — ASSESSMENT & PLAN NOTE
Increases weakness and gait instability reported today in the setting of recent fall with left rib pain.  See above orders.  Encourage adequate PO hydration  Maintain fall precautions.

## 2024-09-18 NOTE — TELEPHONE ENCOUNTER
Venecia from [facility] called to speak to the on call provider regarding the pt's pain when coughing.    Contacted the on call via Secure chat.

## 2024-09-20 ENCOUNTER — NURSING HOME VISIT (OUTPATIENT)
Dept: GERIATRICS | Facility: OTHER | Age: 79
End: 2024-09-20
Payer: MEDICARE

## 2024-09-20 DIAGNOSIS — R07.81 RIB PAIN ON LEFT SIDE: Primary | ICD-10-CM

## 2024-09-20 DIAGNOSIS — E87.1 HYPONATREMIA: ICD-10-CM

## 2024-09-20 DIAGNOSIS — N18.31 STAGE 3A CHRONIC KIDNEY DISEASE (HCC): ICD-10-CM

## 2024-09-20 PROCEDURE — 99309 SBSQ NF CARE MODERATE MDM 30: CPT | Performed by: NURSE PRACTITIONER

## 2024-09-21 NOTE — PROGRESS NOTES
[facility], [facility] at [address]  EREN Shaffer  [id number] [phone]    Acute Visit Note  LTC: POS 32    ASSESSMENT AND PLAN:  1. Rib pain on left side  Assessment & Plan:  Status post fall with left rib and left hand pain  CXR and left hand x-ray shows no fractures  Repeat imaging, rib study reviewed and shows very difficult to visualize left lateral 5th and 6th rib fractures. Consider CT for best characterization.  Unclear if there are definitive rib fractures based on read. Will ask for clarification from radiologist.   Continue tylenol prn for pain and prn OxyIR   4% Lidocaine patch ordered to left ribs daily, off at HS.   2. Stage 3a chronic kidney disease (HCC)  Assessment & Plan:  Baseline Cr 0.9 to 1.11  Cr currently 1.32 on blood work reviewed from   Encourage adequate PO hydration  Avoid nephrotoxins and hypotension  3. Hyponatremia  Assessment & Plan:  Sodium level baseline 128-134 inpatient  Sodium currently 132 on 24  Continue adequate hydration  Will monitor BMP      Name: Farnaz Martin                 : 1945               Sex: female    HPI:    79-year-old female patient seen and examined today for left rib pain. She is status post fall on 09/15/2024. CXR results reveal no left rib fracture, however, she is still experiencing increased pain. No increased sob or chest pain reported.     The following portions of the patient's history were reviewed and updated as appropriate: allergies, current medications, past family history, past medical history, past social history, past surgical history and problem list.    ROS: Review of Systems   Constitutional:  Negative for diaphoresis, fatigue and fever.   Respiratory:  Negative for chest tightness, shortness of breath and wheezing.    Cardiovascular:  Negative for chest pain, palpitations and leg swelling.   Musculoskeletal:  Positive for arthralgias, gait problem and myalgias.        Left rib  pain   Neurological:  Negative for dizziness and headaches.       Allergies   Allergen Reactions    Sotalol Other (See Comments)     Prolonged QTc leading to torsades de pointes     Penicillins Other (See Comments)     As a child calcium deposit in the arm     Ace Inhibitors GI Intolerance     Did feel well on it    Omeprazole Abdominal Pain, Rash and Vomiting     stomach pain, vomiting, rash       Medications:    No current facility-administered medications on file prior to visit.     Current Outpatient Medications on File Prior to Visit   Medication Sig Dispense Refill    albuterol (2.5 mg/3 mL) 0.083 % nebulizer solution Take 3 mL (2.5 mg total) by nebulization 4 (four) times a day      albuterol (ProAir HFA) 90 mcg/act inhaler Inhale 2 puffs every 6 (six) hours as needed for wheezing 8.5 g 3    apixaban (ELIQUIS) 5 mg Take 1 tablet (5 mg total) by mouth 2 (two) times a day 60 tablet 3    ascorbic Acid (VITAMIN C) 500 MG CPCR Take 500 mg by mouth daily      benzonatate (TESSALON PERLES) 100 mg capsule Take 1 capsule (100 mg total) by mouth 3 (three) times a day as needed for cough 20 capsule 0    bumetanide (BUMEX) 2 mg tablet Take 1 tablet (2 mg total) by mouth daily 30 tablet 1    Cetirizine HCl (ZyrTEC Allergy) 10 MG CAPS Take 5 mg by mouth in the morning      Cholecalciferol 50 MCG (2000 UT) CAPS Take 2,000 Units by mouth 2 (two) times a day      dapagliflozin 5 MG TABS Take 1 tablet (5 mg total) by mouth daily 30 tablet 5    diltiazem (CARDIZEM CD) 120 mg 24 hr capsule Take 120 mg by mouth daily      ezetimibe (ZETIA) 10 mg tablet Take 1 tablet (10 mg total) by mouth daily at bedtime 30 tablet 0    famotidine (PEPCID) 20 mg tablet Take 20 mg by mouth daily M, W, F in the AM      flecainide (TAMBOCOR) 150 MG tablet Take 1 tablet (150 mg total) by mouth 2 (two) times a day 30 tablet 0    gabapentin (NEURONTIN) 300 mg capsule take 1 capsule by mouth at bedtime 90 capsule 1    glucose blood (OneTouch Verio) test  strip Check blood sugars once daily. Please substitute with appropriate alternative as covered by patient's insurance. Dx: E11.65 100 each 3    Insulin Glargine Solostar (Lantus SoloStar) 100 UNIT/ML SOPN Inject 0.1 mL (10 Units total) under the skin daily at bedtime 15 mL 1    Insulin Pen Needle 31G X 4 MM MISC Use daily at bedtime 100 each 2    ipratropium (ATROVENT) 0.03 % nasal spray 2 sprays into each nostril 3 (three) times a day Begin once per day, then progress to twice per day. Progress to three times a day as tolerated. 90 mL 3    metoprolol succinate (TOPROL-XL) 25 mg 24 hr tablet take 3 tablets by mouth every 12 hours 540 tablet 2    OneTouch Delica Lancets 33G MISC Check blood sugars once daily. Please substitute with appropriate alternative as covered by patient's insurance. Dx: E11.65 100 each 3    rOPINIRole (REQUIP) 1 mg tablet Take 2 tablets (2 mg total) by mouth daily at bedtime 180 tablet 1    tiotropium (Spiriva Respimat) 2.5 MCG/ACT AERS inhaler Inhale 2 puffs daily 4 g 2    TURMERIC PO Take 1 capsule by mouth 2 (two) times a day      zolpidem (AMBIEN) 10 mg tablet Take 1 tablet (10 mg total) by mouth daily at bedtime 14 tablet 0       History:  Past Medical History:   Diagnosis Date    Actinic keratosis     last assessed - 06Jun2014    Arthritis     Atrial fibrillation with rapid ventricular response (HCC)     last assessed - 26Apr2016    Atrial fibrillation with rapid ventricular response (HCC) 04/19/2016    Basal cell carcinoma     Benign colon polyp     last assessed - 27Apr2015    Bronchiectasis without complication (HCC)     CHF (congestive heart failure) (HCC) 06/2022    Chronic diastolic CHF (congestive heart failure) (HCC)     Disease of thyroid gland     Effusion of knee joint right     Resolved - 19Apr2016    Esophageal reflux     Fibromyalgia     last assessed - 27Dec2017    Fibromyalgia, primary     GERD (gastroesophageal reflux disease)     Hyperlipidemia     Hypertension      Hyponatremia     Malignant neoplasm of thyroid gland (HCC)     Meningioma (HCC)     MGUS (monoclonal gammopathy of unknown significance)     Palpitations     last assessed - [id number]    Peroneal tendonitis, right     last assessed - [id number]    Right lumbar radiculopathy 03/17/2016    Thyroid cancer (HCC)     Thyroid nodule     Trochanteric bursitis of left hip 03/09/2018     Past Surgical History:   Procedure Laterality Date    CARDIAC ELECTROPHYSIOLOGY STUDY AND ABLATION  08/2022    CATARACT EXTRACTION Bilateral     COLONOSCOPY  03/2018    COLONOSCOPY W/ POLYPECTOMY  2021    repeat in 5 years    EYE SURGERY      IR PLEURAL EFFUSION LONG-TERM CATHETER PLACEMENT  8/19/2024    IR THORACENTESIS  7/27/2024    IR THORACENTESIS  8/13/2024    OOPHORECTOMY      KY NEUROPLASTY &/TRANSPOS MEDIAN NRV CARPAL TUNNE Right 11/14/2019    Procedure: RELEASE CARPAL TUNNEL;  Surgeon: Onesimo Tate MD;  [facility]: [facility];  Service: Orthopedics    KY TOTAL THYROID LOBECTOMY UNI W/WO ISTHMUSECTOMY Left 12/16/2020    Procedure: Left THYROID LOBECTOMY;  Surgeon: Jhony Bhatt MD;  [facility]: [facility];  Service: ENT    RECTAL POLYPECTOMY      REPLACEMENT TOTAL KNEE Left     last assessed - [id number]    TONSILLECTOMY      TOTAL ABDOMINAL HYSTERECTOMY      TUBAL LIGATION      US GUIDED THYROID BIOPSY  10/14/2020     Family History   Problem Relation Age of Onset    Heart disease Mother     Diabetes Mother     Heart disease Father     Coronary artery disease Father     Stroke Father         cerebrovascular accident    Heart attack Father         myocardial infarction    Sudden death Father         scd    Other Family         Back disorder    Coronary artery disease Family     Neuropathy Family     Osteoporosis Family     No Known Problems Daughter     No Known Problems Maternal Grandmother     No Known Problems Maternal Grandfather     No Known Problems Paternal Grandmother     No Known Problems Paternal Grandfather     Cancer Maternal  Uncle     Breast cancer Maternal Aunt 65    No Known Problems Son     No Known Problems Maternal Aunt     No Known Problems Maternal Aunt     No Known Problems Maternal Aunt     No Known Problems Paternal Aunt     No Known Problems Paternal Aunt     Anuerysm Neg Hx     Clotting disorder Neg Hx     Arrhythmia Neg Hx     Heart failure Neg Hx      Social History     Socioeconomic History    Marital status:      Spouse name: Not on file    Number of children: Not on file    Years of education: Not on file    Highest education level: Not on file   Occupational History    Not on file   Tobacco Use    Smoking status: Never    Smokeless tobacco: Never   Vaping Use    Vaping status: Never Used   Substance and Sexual Activity    Alcohol use: Not Currently    Drug use: Never    Sexual activity: Not Currently   Other Topics Concern    Not on file   Social History Narrative    ** Merged History Encounter **          Social Determinants of Health     Financial Resource Strain: Patient Unable To Answer (12/19/2023)    Overall Financial Resource Strain (CARDIA)     Difficulty of Paying Living Expenses: Patient unable to answer   Food Insecurity: No Food Insecurity (7/25/2024)    Hunger Vital Sign     Worried About Running Out of Food in the Last Year: Never true     Ran Out of Food in the Last Year: Never true   Transportation Needs: No Transportation Needs (7/25/2024)    PRAPARE - Transportation     Lack of Transportation (Medical): No     Lack of Transportation (Non-Medical): No   Physical Activity: Not on file   Stress: Not on file   Social Connections: Not on file   Intimate Partner Violence: Not At Risk (7/31/2023)    [facility] from [facility], [facility]    Humiliation, Afraid, Rape, and Kick questionnaire     Fear of Current or Ex-Partner: No     Emotionally Abused: No     Physically Abused: No     Sexually Abused: No   Housing Stability: Low Risk  (7/25/2024)    Housing Stability  Vital Sign     Unable to Pay for Housing in the Last Year: No     Number of Times Moved in the Last Year: 0     Homeless in the Last Year: No     Past Surgical History:   Procedure Laterality Date    CARDIAC ELECTROPHYSIOLOGY STUDY AND ABLATION  08/2022    CATARACT EXTRACTION Bilateral     COLONOSCOPY  03/2018    COLONOSCOPY W/ POLYPECTOMY  2021    repeat in 5 years    EYE SURGERY      IR PLEURAL EFFUSION LONG-TERM CATHETER PLACEMENT  8/19/2024    IR THORACENTESIS  7/27/2024    IR THORACENTESIS  8/13/2024    OOPHORECTOMY      CT NEUROPLASTY &/TRANSPOS MEDIAN NRV CARPAL TUNNE Right 11/14/2019    Procedure: RELEASE CARPAL TUNNEL;  Surgeon: Onesimo Tate MD;  [facility];  Service: Orthopedics    CT TOTAL THYROID LOBECTOMY UNI W/WO ISTHMUSECTOMY Left 12/16/2020    Procedure: Left THYROID LOBECTOMY;  Surgeon: Jhony Bhatt MD;  [facility];  Service: ENT    RECTAL POLYPECTOMY      REPLACEMENT TOTAL KNEE Left     last assessed - 27Apr2015    TONSILLECTOMY      TOTAL ABDOMINAL HYSTERECTOMY      TUBAL LIGATION      US GUIDED THYROID BIOPSY  10/14/2020       OBJECTIVE:  Physical Exam  Vitals and nursing note reviewed.   Constitutional:       General: She is not in acute distress.     Appearance: Normal appearance. She is not ill-appearing, toxic-appearing or diaphoretic.   Cardiovascular:      Rate and Rhythm: Normal rate and regular rhythm.      Pulses: Normal pulses.   Pulmonary:      Effort: Pulmonary effort is normal. No respiratory distress.      Breath sounds: Normal breath sounds. No wheezing, rhonchi or rales.   Musculoskeletal:         General: Tenderness present.      Right lower leg: No edema.      Left lower leg: No edema.      Comments: Right sided pleural catheter intact.   Tenderness of left lateral ribs upon palpation   Skin:     General: Skin is warm and dry.      Findings: No bruising or erythema.   Neurological:      Mental Status: She is alert. Mental status is at baseline.      Motor:  No weakness.      Gait: Gait abnormal.   Psychiatric:         Mood and Affect: Mood normal.         Behavior: Behavior normal.         Thought Content: Thought content normal.       Labs & Imaging Reviewed in Epic and Facility EMR.   PROCEDURE: RIB UNI-LAT INC PA CHEST MIN3V STATUS: Final   INTERPRETATION:  Reason for Study: R07.9 CHEST PAIN, UNSPECIFIED  Principal Result : SARAH SALCIDO ([id number])  Technician: IMELDA HONEYCUTT (JMATIABE)  Transcription Technician: NANCY    RIB UNI-LAT INC PA CHEST MIN3V, LEFT Comparison: 9/16/2024  FINDINGS: Very difficult to visualize nondisplaced left lateral 5 and 6 rib fractures. No definite pneumothorax. Unremarkable soft tissues.  CONCLUSION: Very difficult to visualize nondisplaced left lateral 5 and 6 rib fractures. Consider a CT chest for best characterization of clinically indicated.       JENNY Monroy  Geriatric Medicine  09/20/2024

## 2024-09-26 ENCOUNTER — NURSING HOME VISIT (OUTPATIENT)
Dept: GERIATRICS | Facility: OTHER | Age: 79
End: 2024-09-26
Payer: MEDICARE

## 2024-09-26 DIAGNOSIS — I50.33 ACUTE ON CHRONIC DIASTOLIC HEART FAILURE (HCC): ICD-10-CM

## 2024-09-26 DIAGNOSIS — E87.1 HYPONATREMIA: ICD-10-CM

## 2024-09-26 DIAGNOSIS — N18.31 STAGE 3A CHRONIC KIDNEY DISEASE (HCC): ICD-10-CM

## 2024-09-26 DIAGNOSIS — S22.42XD CLOSED FRACTURE OF MULTIPLE RIBS OF LEFT SIDE WITH ROUTINE HEALING: Primary | ICD-10-CM

## 2024-09-26 PROCEDURE — 99309 SBSQ NF CARE MODERATE MDM 30: CPT | Performed by: NURSE PRACTITIONER

## 2024-09-26 NOTE — PROGRESS NOTES
[facility] at [address]  EREN Shaffer  [id number] [phone]    Acute Visit Note  LTC: POS 32    ASSESSMENT AND PLAN:  1. Closed fracture of multiple ribs of left side with routine healing  Assessment & Plan:  Status post fall on 09/15/2024  Original chest x-ray revealed no acute rib fractures  Repeat rib study now reveals 6th, 7th and 8th rib fractures  Continue pain management with scheduled tylenol, prn OxyIR   IS encouraged  Will monitor respiratory status.   2. Stage 3a chronic kidney disease (HCC)  Assessment & Plan:  Baseline Cr 0.9 to 1.11  Cr currently improved and is now 1.15 on 2024  Encourage adequate PO hydration  Avoid nephrotoxins and hypotension  3. Hyponatremia  Assessment & Plan:  Sodium level baseline 128-134 inpatient  Sodium currently stable at 141 on 2024  Continue adequate hydration  Will monitor BMP  4. Acute on chronic diastolic heart failure (HCC)  Assessment & Plan:  Patient appears euvolemic in examination  Weight for the past week stable. Weight on  was 156.3 lbs, today weight is 153.8 lbs.  BNP decreased from 1270 on 24 to 1118 on 3034  Continue Bumex and toprol    Will continue to monitor for volume overload.     Name: Farnaz Martin                 : 1945               Sex: female    HPI:    79-year-old female patient seen and examined today for cough, left rib pain and sob. Patient had a repeat rib study performed and reveals 6th, 7th and 8th left sided rib fractures. She is status post fall on 09/15/2024.     The following portions of the patient's history were reviewed and updated as appropriate: allergies, current medications, past family history, past medical history, past social history, past surgical history and problem list.    ROS: Review of Systems   Constitutional:  Negative for chills, fatigue and fever.   Respiratory:  Positive for cough and shortness of breath. Negative for chest  tightness and wheezing.    Cardiovascular:  Negative for chest pain, palpitations and leg swelling.   Musculoskeletal:  Positive for arthralgias and myalgias.        Left rib pain       Allergies   Allergen Reactions    Sotalol Other (See Comments)     Prolonged QTc leading to torsades de pointes     Penicillins Other (See Comments)     As a child calcium deposit in the arm     Ace Inhibitors GI Intolerance     Did feel well on it    Omeprazole Abdominal Pain, Rash and Vomiting     stomach pain, vomiting, rash       Medications:    No current facility-administered medications on file prior to visit.     Current Outpatient Medications on File Prior to Visit   Medication Sig Dispense Refill    albuterol (2.5 mg/3 mL) 0.083 % nebulizer solution Take 3 mL (2.5 mg total) by nebulization 4 (four) times a day      albuterol (ProAir HFA) 90 mcg/act inhaler Inhale 2 puffs every 6 (six) hours as needed for wheezing 8.5 g 3    apixaban (ELIQUIS) 5 mg Take 1 tablet (5 mg total) by mouth 2 (two) times a day 60 tablet 3    ascorbic Acid (VITAMIN C) 500 MG CPCR Take 500 mg by mouth daily      benzonatate (TESSALON PERLES) 100 mg capsule Take 1 capsule (100 mg total) by mouth 3 (three) times a day as needed for cough 20 capsule 0    bumetanide (BUMEX) 2 mg tablet Take 1 tablet (2 mg total) by mouth daily 30 tablet 1    Cetirizine HCl (ZyrTEC Allergy) 10 MG CAPS Take 5 mg by mouth in the morning      Cholecalciferol 50 MCG (2000 UT) CAPS Take 2,000 Units by mouth 2 (two) times a day      dapagliflozin 5 MG TABS Take 1 tablet (5 mg total) by mouth daily 30 tablet 5    diltiazem (CARDIZEM CD) 120 mg 24 hr capsule Take 120 mg by mouth daily      ezetimibe (ZETIA) 10 mg tablet Take 1 tablet (10 mg total) by mouth daily at bedtime 30 tablet 0    famotidine (PEPCID) 20 mg tablet Take 20 mg by mouth daily M, W, F in the AM      flecainide (TAMBOCOR) 150 MG tablet Take 1 tablet (150 mg total) by mouth 2 (two) times a day 30 tablet 0     gabapentin (NEURONTIN) 300 mg capsule take 1 capsule by mouth at bedtime 90 capsule 1    glucose blood (OneTouch Verio) test strip Check blood sugars once daily. Please substitute with appropriate alternative as covered by patient's insurance. Dx: E11.65 100 each 3    Insulin Glargine Solostar (Lantus SoloStar) 100 UNIT/ML SOPN Inject 0.1 mL (10 Units total) under the skin daily at bedtime 15 mL 1    Insulin Pen Needle 31G X 4 MM MISC Use daily at bedtime 100 each 2    ipratropium (ATROVENT) 0.03 % nasal spray 2 sprays into each nostril 3 (three) times a day Begin once per day, then progress to twice per day. Progress to three times a day as tolerated. 90 mL 3    metoprolol succinate (TOPROL-XL) 25 mg 24 hr tablet take 3 tablets by mouth every 12 hours 540 tablet 2    OneTouch Delica Lancets 33G MISC Check blood sugars once daily. Please substitute with appropriate alternative as covered by patient's insurance. Dx: E11.65 100 each 3    rOPINIRole (REQUIP) 1 mg tablet Take 2 tablets (2 mg total) by mouth daily at bedtime 180 tablet 1    tiotropium (Spiriva Respimat) 2.5 MCG/ACT AERS inhaler Inhale 2 puffs daily 4 g 2    TURMERIC PO Take 1 capsule by mouth 2 (two) times a day      zolpidem (AMBIEN) 10 mg tablet Take 1 tablet (10 mg total) by mouth daily at bedtime 14 tablet 0       History:  Past Medical History:   Diagnosis Date    Actinic keratosis     last assessed - 06Jun2014    Arthritis     Atrial fibrillation with rapid ventricular response (HCC)     last assessed - 26Apr2016    Atrial fibrillation with rapid ventricular response (HCC) 04/19/2016    Basal cell carcinoma     Benign colon polyp     last assessed - 27Apr2015    Bronchiectasis without complication (HCC)     CHF (congestive heart failure) (HCC) 06/2022    Chronic diastolic CHF (congestive heart failure) (HCC)     Disease of thyroid gland     Effusion of knee joint right     Resolved - 19Apr2016    Esophageal reflux     Fibromyalgia     last assessed  - [id number]    Fibromyalgia, primary     GERD (gastroesophageal reflux disease)     Hyperlipidemia     Hypertension     Hyponatremia     Malignant neoplasm of thyroid gland (HCC)     Meningioma (HCC)     MGUS (monoclonal gammopathy of unknown significance)     Palpitations     last assessed - [id number]    Peroneal tendonitis, right     last assessed - [id number]    Right lumbar radiculopathy 03/17/2016    Thyroid cancer (HCC)     Thyroid nodule     Trochanteric bursitis of left hip 03/09/2018     Past Surgical History:   Procedure Laterality Date    CARDIAC ELECTROPHYSIOLOGY STUDY AND ABLATION  08/2022    CATARACT EXTRACTION Bilateral     COLONOSCOPY  03/2018    COLONOSCOPY W/ POLYPECTOMY  2021    repeat in 5 years    EYE SURGERY      IR PLEURAL EFFUSION LONG-TERM CATHETER PLACEMENT  8/19/2024    IR THORACENTESIS  7/27/2024    IR THORACENTESIS  8/13/2024    OOPHORECTOMY      [place] NEUROPLASTY &/TRANSPOS MEDIAN NRV CARPAL TUNNE Right 11/14/2019    Procedure: RELEASE CARPAL TUNNEL;  Surgeon: Onesimo Tate MD;  [facility];  Service: Orthopedics    [place] TOTAL THYROID LOBECTOMY UNI W/WO ISTHMUSECTOMY Left 12/16/2020    Procedure: Left THYROID LOBECTOMY;  Surgeon: Jhony Bhatt MD;  [facility];  Service: ENT    RECTAL POLYPECTOMY      REPLACEMENT TOTAL KNEE Left     last assessed - [id number]    TONSILLECTOMY      TOTAL ABDOMINAL HYSTERECTOMY      TUBAL LIGATION      US GUIDED THYROID BIOPSY  10/14/2020     Family History   Problem Relation Age of Onset    Heart disease Mother     Diabetes Mother     Heart disease Father     Coronary artery disease Father     Stroke Father         cerebrovascular accident    Heart attack Father         myocardial infarction    Sudden death Father         scd    Other Family         Back disorder    Coronary artery disease Family     Neuropathy Family     Osteoporosis Family     No Known Problems Daughter     No Known Problems Maternal Grandmother     No Known Problems Maternal  Grandfather     No Known Problems Paternal Grandmother     No Known Problems Paternal Grandfather     Cancer Maternal Uncle     Breast cancer Maternal Aunt 65    No Known Problems Son     No Known Problems Maternal Aunt     No Known Problems Maternal Aunt     No Known Problems Maternal Aunt     No Known Problems Paternal Aunt     No Known Problems Paternal Aunt     Anuerysm Neg Hx     Clotting disorder Neg Hx     Arrhythmia Neg Hx     Heart failure Neg Hx      Social History     Socioeconomic History    Marital status:      Spouse name: Not on file    Number of children: Not on file    Years of education: Not on file    Highest education level: Not on file   Occupational History    Not on file   Tobacco Use    Smoking status: Never    Smokeless tobacco: Never   Vaping Use    Vaping status: Never Used   Substance and Sexual Activity    Alcohol use: Not Currently    Drug use: Never    Sexual activity: Not Currently   Other Topics Concern    Not on file   Social History Narrative    ** Merged History Encounter **          Social Determinants of Health     Financial Resource Strain: Patient Unable To Answer (12/19/2023)    Overall Financial Resource Strain (CARDIA)     Difficulty of Paying Living Expenses: Patient unable to answer   Food Insecurity: No Food Insecurity (7/25/2024)    Hunger Vital Sign     Worried About Running Out of Food in the Last Year: Never true     Ran Out of Food in the Last Year: Never true   Transportation Needs: No Transportation Needs (7/25/2024)    PRAPARE - Transportation     Lack of Transportation (Medical): No     Lack of Transportation (Non-Medical): No   Physical Activity: Not on file   Stress: Not on file   Social Connections: Not on file   Intimate Partner Violence: Not At Risk (7/31/2023)    [facility] from [facility], [facility]    Humiliation, Afraid, Rape, and Kick questionnaire     Fear of Current or Ex-Partner: No     Emotionally Abused:  No     Physically Abused: No     Sexually Abused: No   Housing Stability: Low Risk  (7/25/2024)    Housing Stability Vital Sign     Unable to Pay for Housing in the Last Year: No     Number of Times Moved in the Last Year: 0     Homeless in the Last Year: No     Past Surgical History:   Procedure Laterality Date    CARDIAC ELECTROPHYSIOLOGY STUDY AND ABLATION  08/2022    CATARACT EXTRACTION Bilateral     COLONOSCOPY  03/2018    COLONOSCOPY W/ POLYPECTOMY  2021    repeat in 5 years    EYE SURGERY      IR PLEURAL EFFUSION LONG-TERM CATHETER PLACEMENT  8/19/2024    IR THORACENTESIS  7/27/2024    IR THORACENTESIS  8/13/2024    OOPHORECTOMY      [place] NEUROPLASTY &/TRANSPOS MEDIAN NRV CARPAL TUNNE Right 11/14/2019    Procedure: RELEASE CARPAL TUNNEL;  Surgeon: Onesimo Tate MD;  [facility];  Service: Orthopedics    [place] TOTAL THYROID LOBECTOMY UNI W/WO ISTHMUSECTOMY Left 12/16/2020    Procedure: Left THYROID LOBECTOMY;  Surgeon: Jhony Bhatt MD;  [facility];  Service: ENT    RECTAL POLYPECTOMY      REPLACEMENT TOTAL KNEE Left     last assessed - 27Apr2015    TONSILLECTOMY      TOTAL ABDOMINAL HYSTERECTOMY      TUBAL LIGATION      US GUIDED THYROID BIOPSY  10/14/2020       OBJECTIVE:  Vital signs:  /70, Temp 97.2, HR 80, RR 20, SpO2 94% RA, Wgt: 153.8 lbs   Physical Exam  Vitals and nursing note reviewed.   Constitutional:       General: She is not in acute distress.     Appearance: Normal appearance. She is not ill-appearing, toxic-appearing or diaphoretic.   Cardiovascular:      Rate and Rhythm: Normal rate and regular rhythm.      Pulses: Normal pulses.   Pulmonary:      Effort: Pulmonary effort is normal. No respiratory distress.      Breath sounds: Normal breath sounds. No wheezing, rhonchi or rales.   Musculoskeletal:      Right lower leg: No edema.      Left lower leg: No edema.   Skin:     General: Skin is warm and dry.      Findings: No bruising or erythema.   Neurological:      Mental  Status: She is alert and oriented to person, place, and time. Mental status is at baseline.      Motor: No weakness.      Gait: Gait abnormal.   Psychiatric:         Mood and Affect: Mood normal.         Behavior: Behavior normal.         Labs & Imaging Reviewed in Epic and facility EMR.   PROCEDURE: RIB UNI-LAT INC PA CHEST MIN3V STATUS: Final   INTERPRETATION:  Reason for Study: R07.9 CHEST PAIN, UNSPECIFIED  Principal Result : CRISTINO QUIROZ ([id number])  Technician: IMELDA HONEYCUTT (JMATIABE)  Transcription Technician: NANCY    RIB UNI-LAT INC PA CHEST MIN3V, LEFT Comparison: Comparison 9/18/2024 and 9/16/2024  See Note  FINDINGS: There is osteoporosis, there are minimally displaced fractures of the posterolateral aspect of the left sixth, seventh and eighth ribs. Examination of the left rib cage is limited due to overlying artifact from the pacemaker generator along the left hemithorax. There is complete expansion of the left lung with no infiltrates or effusions. The cardiac silhouette is enlarged.  CONCLUSION: There are nondisplaced fractures of the left rib cage as described.  ELECTRONICALLY SIGNED BY CRISTINO QUIROZ M.D. 9/25/2024 3:24:47 PM EDT.     JENNY Monroy  Geriatric Medicine  09/26/2024

## 2024-09-30 ENCOUNTER — NURSING HOME VISIT (OUTPATIENT)
Dept: GERIATRICS | Facility: OTHER | Age: 79
End: 2024-09-30
Payer: MEDICARE

## 2024-09-30 DIAGNOSIS — J90 RECURRENT PLEURAL EFFUSION ON RIGHT: Primary | ICD-10-CM

## 2024-09-30 DIAGNOSIS — I50.33 ACUTE ON CHRONIC DIASTOLIC HEART FAILURE (HCC): ICD-10-CM

## 2024-09-30 PROCEDURE — 99309 SBSQ NF CARE MODERATE MDM 30: CPT | Performed by: NURSE PRACTITIONER

## 2024-10-01 PROBLEM — I48.91 ATRIAL FIBRILLATION (HCC): Status: ACTIVE | Noted: 2024-01-01

## 2024-10-01 PROBLEM — E11.9 DIABETES MELLITUS TYPE 2, INSULIN DEPENDENT (HCC): Status: ACTIVE | Noted: 2024-01-01

## 2024-10-01 PROBLEM — Z79.4 DIABETES MELLITUS TYPE 2, INSULIN DEPENDENT (HCC): Status: ACTIVE | Noted: 2024-01-01

## 2024-10-01 PROBLEM — N17.9 AKI (ACUTE KIDNEY INJURY) (HCC): Status: ACTIVE | Noted: 2024-01-01

## 2024-10-01 PROBLEM — E87.5 HYPERKALEMIA: Status: ACTIVE | Noted: 2024-01-01

## 2024-10-01 NOTE — ED ATTENDING ATTESTATION
10/1/2024  I, Odin Garza DO, saw and evaluated the patient. I have discussed the patient with the resident/non-physician practitioner and agree with the resident's/non-physician practitioner's findings, Plan of Care, and MDM as documented in the resident's/non-physician practitioner's note, except where noted. All available labs and Radiology studies were reviewed.  I was present for key portions of any procedure(s) performed by the resident/non-physician practitioner and I was immediately available to provide assistance.       At this point I agree with the current assessment done in the Emergency Department.  I have conducted an independent evaluation of this patient a history and physical is as follows:    This is an 79-year-old female ED for evaluation of respiratory distress.  She has a history of CHF, she has DNR, DNI per POLST.  She has a chronic right lung tunneled catheter which was apparently drained today at the facility.    PE:  The patient is ill appearing, in moderate acute resp distress. Head: normocephalic, atraumatic. HEENT: mucous membranes moist.  Lungs: CTA b/l, mod resp distress with accessory muscle use.  Heart: RRR. No M/R/G. Abdomen: NT, ND, no R/R/G. Neuro: CN2-12 intact, GCS 15. Normal strength and sensation, normal speech and gait. Cap refill < 2 sec, skin warm and dry. No rashes or lesions. No peripheral edema.    Patient placed on nonrebreather mask, then transitioned to BIPAP with improvement in work of breathing. She was initially hypoxic in the 70s-80s, then in the 90s on BIPAP.  No large pneumothorax, xray consistent with CHF, vs. Pneumonia. CT to further clarify cause of resp distress.  Admit for acute resp failure w/ hypoxia. Treat mild hyperkalemia, hyponatremia as volume overload (weight up 30 lbs) with lasix.        ED Course         Critical Care Time  CriticalCare Time    Date/Time: 10/1/2024 6:59 PM    Performed by: Oidn Garza DO  Authorized by: Odin Garza DO     Critical care provider statement:     Critical care time (minutes):  45    Critical care time was exclusive of:  Separately billable procedures and treating other patients and teaching time    Critical care was necessary to treat or prevent imminent or life-threatening deterioration of the following conditions:  Respiratory failure and cardiac failure    Critical care was time spent personally by me on the following activities:  Obtaining history from patient or surrogate, development of treatment plan with patient or surrogate, discussions with primary provider, evaluation of patient's response to treatment, examination of patient, review of old charts, re-evaluation of patient's condition, ordering and review of radiographic studies, ordering and review of laboratory studies and ordering and performing treatments and interventions

## 2024-10-01 NOTE — ED PROVIDER NOTES
Final diagnoses:   Acute respiratory failure with hypoxia (HCC)   CHF (congestive heart failure) (HCC)   Pleural effusion   Hyponatremia   Hyperkalemia   KIMBERLEY (acute kidney injury) (HCC)   Lactic acidosis     ED Disposition       ED Disposition   Admit    Condition   Stable    Date/Time   Tue Oct 1, 2024  8:01 PM    Comment   Case was discussed with Dr. Landers and the patient's admission status was agreed to be Admission Status: inpatient status to the service of Dr. Landers.               Assessment & Plan       Medical Decision Making  79-year-old female with recent thoracentesis presents to the emergency department with worsening respiratory distress.  Patient seen and examined noted to be tachypneic, saturations with poor waveform noted in 70s to 80s with nonrebreather.  Heart rate paced in low 60s, normotensive.  No pitting edema bilaterally.  Patient alert, responding to questions appropriately, placed on BiPAP immediately upon my arrival with improvement of oxygen saturation to mid 90s to 100%.  Differential diagnosis includes but is not limited to pneumonia, pneumothorax, pleural effusion recurrence, ACS, doubt PE given patient's chronic anticoagulation with Eliquis.  Stat chest x-ray obtained, new left-sided consolidation appreciated, improved right-sided pleural effusion as compared to previous chest x-ray.  CT obtained showing atelectasis as well as left-sided pleural effusion.  Septic lab work obtained as well as troponin and BNP.  Patient meeting severe sepsis criteria, vancomycin as well as cefepime administered.  KIMBERLEY appreciated 250 cc bolus originally provided, however elevated BNP as well as patient's increased weight from previous concerning for CHF exacerbation.  Provided 80 mg of Lasix equivalent to 2 mg of patient's home Bumex.  Patient had significant improvement of respiratory status and was eventually transition to mid flow.  Discussed with internal medicine service who accepted the  patient for further evaluation and management.    Amount and/or Complexity of Data Reviewed  Labs: ordered. Decision-making details documented in ED Course.  Radiology: ordered and independent interpretation performed.    Risk  Prescription drug management.  Decision regarding hospitalization.        ED Course as of 10/01/24 2313   Tue Oct 01, 2024   1702 WBC: 8.33  Reassuring, within normal limits     1703 Hemoglobin: 12.1  Reassuring, within normal limits   1725 BNP(!): 1,039   1725 LACTIC ACID(!): 2.2   1843 Creatinine(!): 1.85  KIMBERLEY appreciated   1843 Potassium(!): 5.7  Slight hyperkalemia   1843 Sodium(!): 127  Notable hyponatremia, when patient, if patient has increased in weight since previous will likely diurese.  If patient is consistent with weight, will attempt increase fluid challenge   1852 LACTIC ACID: 1.7   1932 Discussed CT findings with patient and family, notes that she fell 2 weeks ago, had x-ray which noted 2 broken ribs at that time.  Patient denies any acute pain.       Medications   insulin lispro (HumALOG/ADMELOG) 100 units/mL subcutaneous injection 1-6 Units (has no administration in time range)   insulin lispro (HumALOG/ADMELOG) 100 units/mL subcutaneous injection 1-6 Units (has no administration in time range)   melatonin tablet 3 mg (has no administration in time range)   vancomycin (VANCOCIN) 1500 mg in sodium chloride 0.9% 250 mL IVPB (0 mg Intravenous Stopped 10/1/24 1949)   cefepime (MAXIPIME) 2 g/50 mL dextrose IVPB (0 mg Intravenous Stopped 10/1/24 1807)   sodium chloride 0.9 % bolus 250 mL (0 mL Intravenous Stopped 10/1/24 1837)   furosemide (LASIX) injection 40 mg (40 mg Intravenous Given 10/1/24 1909)   furosemide (LASIX) injection 40 mg (40 mg Intravenous Given 10/1/24 2024)   albuterol inhalation solution 10 mg (has no administration in time range)   calcium gluconate 1 g in sodium chloride 0.9% 50 mL (premix) (has no administration in time range)   insulin regular (HumuLIN  R,NovoLIN R) injection 10 Units (has no administration in time range)   dextrose 50 % IV solution 25 mL (has no administration in time range)       ED Risk Strat Scores                           SBIRT 20yo+      Flowsheet Row Most Recent Value   Initial Alcohol Screen: US AUDIT-C     1. How often do you have a drink containing alcohol? 0 Filed at: 10/01/2024 1654   2. How many drinks containing alcohol do you have on a typical day you are drinking?  0 Filed at: 10/01/2024 1654   3a. Male UNDER 65: How often do you have five or more drinks on one occasion? 0 Filed at: 10/01/2024 1654   3b. FEMALE Any Age, or MALE 65+: How often do you have 4 or more drinks on one occassion? 0 Filed at: 10/01/2024 1654   Audit-C Score 0 Filed at: 10/01/2024 1654   TARA: How many times in the past year have you...    Used an illegal drug or used a prescription medication for non-medical reasons? Never Filed at: 10/01/2024 1654                            History of Present Illness       Chief Complaint   Patient presents with    Respiratory Distress     Pt arrives via EMS from [facility] for respiratory distress. Nursing home could not get Sp02 above 72% on 8L. EMS placed patient on nonrebreather and was getting Sp02 between 76-86%. Pt reports having increased weakness recently. Pt reports her shortness of breath has been on and off. Hx of CHF.         Past Medical History:   Diagnosis Date    Actinic keratosis     last assessed - 06Jun2014    Arthritis     Atrial fibrillation with rapid ventricular response (HCC)     last assessed - 26Apr2016    Atrial fibrillation with rapid ventricular response (HCC) 04/19/2016    Basal cell carcinoma     Benign colon polyp     last assessed - 27Apr2015    Bronchiectasis without complication (HCC)     CHF (congestive heart failure) (HCC) 06/2022    Chronic diastolic CHF (congestive heart failure) (HCC)     Disease of thyroid gland     Effusion of knee joint right     Resolved - 19Apr2016     Esophageal reflux     Fibromyalgia     last assessed - [id number]    Fibromyalgia, primary     GERD (gastroesophageal reflux disease)     Hyperlipidemia     Hypertension     Hyponatremia     Malignant neoplasm of thyroid gland (HCC)     Meningioma (HCC)     MGUS (monoclonal gammopathy of unknown significance)     Palpitations     last assessed - [id number]    Peroneal tendonitis, right     last assessed - [id number]    Right lumbar radiculopathy 03/17/2016    Thyroid cancer (HCC)     Thyroid nodule     Trochanteric bursitis of left hip 03/09/2018      Past Surgical History:   Procedure Laterality Date    CARDIAC ELECTROPHYSIOLOGY STUDY AND ABLATION  08/2022    CATARACT EXTRACTION Bilateral     COLONOSCOPY  03/2018    COLONOSCOPY W/ POLYPECTOMY  2021    repeat in 5 years    EYE SURGERY      IR PLEURAL EFFUSION LONG-TERM CATHETER PLACEMENT  8/19/2024    IR THORACENTESIS  7/27/2024    IR THORACENTESIS  8/13/2024    OOPHORECTOMY      PA NEUROPLASTY &/TRANSPOS MEDIAN NRV CARPAL TUNNE Right 11/14/2019    Procedure: RELEASE CARPAL TUNNEL;  Surgeon: Onesimo Tate MD;  Location: [facility];  Service: Orthopedics    PA TOTAL THYROID LOBECTOMY UNI W/WO ISTHMUSECTOMY Left 12/16/2020    Procedure: Left THYROID LOBECTOMY;  Surgeon: Jhony Bhatt MD;  Location: [facility];  Service: ENT    RECTAL POLYPECTOMY      REPLACEMENT TOTAL KNEE Left     last assessed - [id number]    TONSILLECTOMY      TOTAL ABDOMINAL HYSTERECTOMY      TUBAL LIGATION      US GUIDED THYROID BIOPSY  10/14/2020      Family History   Problem Relation Age of Onset    Heart disease Mother     Diabetes Mother     Heart disease Father     Coronary artery disease Father     Stroke Father         cerebrovascular accident    Heart attack Father         myocardial infarction    Sudden death Father         scd    Other Family         Back disorder    Coronary artery disease Family     Neuropathy Family     Osteoporosis Family     No Known Problems Daughter     No Known  Problems Maternal Grandmother     No Known Problems Maternal Grandfather     No Known Problems Paternal Grandmother     No Known Problems Paternal Grandfather     Cancer Maternal Uncle     Breast cancer Maternal Aunt 65    No Known Problems Son     No Known Problems Maternal Aunt     No Known Problems Maternal Aunt     No Known Problems Maternal Aunt     No Known Problems Paternal Aunt     No Known Problems Paternal Aunt     Anuerysm Neg Hx     Clotting disorder Neg Hx     Arrhythmia Neg Hx     Heart failure Neg Hx       Social History     Tobacco Use    Smoking status: Never    Smokeless tobacco: Never   Vaping Use    Vaping status: Never Used   Substance Use Topics    Alcohol use: Not Currently    Drug use: Never      E-Cigarette/Vaping    E-Cigarette Use Never User       E-Cigarette/Vaping Substances    Nicotine No     THC No     CBD No     Flavoring No     Other No     Unknown No       I have reviewed and agree with the history as documented.     (Farnaz Martin) Farnaz Martin is a 79 y.o. female     They presented to the emergency department on October 1, 2024. Patient presents with:  Respiratory Distress: Pt arrives via EMS from [facility] for respiratory distress. Nursing home could not get Sp02 above 72% on 8L. EMS placed patient on nonrebreather and was getting Sp02 between 76-86%. Pt reports having increased weakness recently. Pt reports her shortness of breath has been on and off. Hx of CHF.        The patient states that she presents from [facility], has a history of pleural effusion had recent thoracentesis.  Patient notes that she acutely began having respiratory distress, and 911 was called and brought to the emergency department.  EMS attempted to place nonrebreather however sats in mid 80s to 70s.  Patient notes generalized weakness.  Per review of chart patient has history of diabetes, CHF, Raynaud's, right pleural effusion with multiple thoracentesis.  Patient denies fever,  chills, cough, chest pain, back pain, abdominal pain, nausea, vomiting, change in bowel habits, change in urination, worsening leg swelling, or any other complaint at this time.      Daughter who arrived at bedside after examination notes patient is maintained on Bumex for chronic history of CHF, does not typically wear oxygen, and is able to ambulate independently when feeling well.  Per daughter patient is DNR/DNI, POLST provided.  Patient confirms wishes to not proceed with advanced resuscitative efforts should decline occur.      Wt Readings from Last 3 Encounters:  10/01/24 : 82.1 kg (181 lb)  09/10/24 : 68.5 kg (151 lb)  08/23/24 : 70.3 kg (155 lb)                Review of Systems   Constitutional:  Negative for chills and fever.   HENT:  Negative for ear pain and sore throat.    Eyes:  Negative for pain and visual disturbance.   Respiratory:  Positive for shortness of breath. Negative for cough.    Cardiovascular:  Negative for chest pain and palpitations.   Gastrointestinal:  Negative for abdominal pain and vomiting.   Genitourinary:  Negative for dysuria and hematuria.   Musculoskeletal:  Negative for arthralgias and back pain.   Skin:  Negative for color change and rash.   Neurological:  Positive for weakness. Negative for seizures and syncope.   All other systems reviewed and are negative.          Objective       ED Triage Vitals   Temperature Pulse Blood Pressure Respirations SpO2 Patient Position - Orthostatic VS   10/01/24 1700 10/01/24 1637 10/01/24 1637 10/01/24 1637 10/01/24 1637 10/01/24 1637   (!) 96.5 °F (35.8 °C) 64 100/71 20 (!) 77 % Sitting      Temp Source Heart Rate Source BP Location FiO2 (%) Pain Score    10/01/24 1700 10/01/24 1637 10/01/24 1637 -- 10/01/24 1700    Axillary Monitor Right arm  No Pain      Vitals      Date and Time Temp Pulse SpO2 Resp BP Pain Score FACES Pain Rating User   10/01/24 2248 97.4 °F (36.3 °C) 60 100 % -- 125/57 -- -- DII   10/01/24 2134 97.9 °F (36.6 °C) 60  99 % -- 116/60 -- -- DII   10/01/24 2030 -- 69 95 % 20 125/67 -- -- CH   10/01/24 1945 -- 60 94 % 20 111/62 -- -- CH   10/01/24 1930 -- 60 98 % 18 116/59 -- -- CH   10/01/24 1928 -- -- 95 % -- -- -- -- ND   10/01/24 1900 -- 60 93 % 20 125/60 -- -- CH   10/01/24 1847 -- -- 90 % -- -- -- -- CH   10/01/24 1845 -- 60 86 % 20 132/64 -- -- CH   10/01/24 1830 -- 60 94 % 20 126/67 -- -- CH   10/01/24 1815 -- 60 95 % 20 125/79 -- -- CH   10/01/24 1730 -- 48 95 % 24 136/64 -- -- CH   10/01/24 1715 -- 59 91 % 22 131/67 -- -- CH   10/01/24 1700 96.5 °F (35.8 °C) 59 90 % 22 124/82 No Pain -- CH   10/01/24 1652 -- -- 93 % -- -- -- -- AJ   10/01/24 1645 -- 60 96 % 20 130/67 -- -- LA   10/01/24 1637 -- 64 77 % 20 100/71 -- -- HK            Physical Exam  Constitutional:       General: She is in acute distress (Severe).      Appearance: She is ill-appearing.   HENT:      Head: Normocephalic and atraumatic.      Right Ear: External ear normal.      Left Ear: External ear normal.      Nose: Nose normal.      Mouth/Throat:      Mouth: Mucous membranes are moist.   Eyes:      Extraocular Movements: Extraocular movements intact.      Pupils: Pupils are equal, round, and reactive to light.   Cardiovascular:      Rate and Rhythm: Normal rate and regular rhythm.   Pulmonary:      Effort: Respiratory distress present.      Comments: Diminished breath sounds at bilateral bases  Abdominal:      General: Abdomen is flat. Bowel sounds are normal. There is no distension.      Palpations: Abdomen is soft.      Tenderness: There is no abdominal tenderness.   Musculoskeletal:         General: Normal range of motion.      Cervical back: Normal range of motion.   Skin:     Capillary Refill: Capillary refill takes 2 to 3 seconds.      Comments: Purpleish discoloration bilateral upper and lower digits   Neurological:      Mental Status: She is alert. Mental status is at baseline.         Results Reviewed       Procedure Component Value Units Date/Time     Phosphorus [[id number]]  (Abnormal) Collected: 10/01/24 1813    Lab Status: Final result Specimen: Blood from Arm, Left Updated: 10/01/24 2244     Phosphorus 6.2 mg/dL     UA w Reflex to Microscopic w Reflex to Culture [[id number]] Collected: 10/01/24 2149    Lab Status: Final result Specimen: Urine, Clean Catch Updated: 10/01/24 2206     Color, UA Light Yellow     Clarity, UA Clear     Specific Gravity, UA 1.013     pH, UA 5.0     Leukocytes, UA Negative     Nitrite, UA Negative     Protein, UA Negative mg/dl      Glucose, UA Negative mg/dl      Ketones, UA Negative mg/dl      Urobilinogen, UA <2.0 mg/dl      Bilirubin, UA Negative     Occult Blood, UA Negative    Magnesium [[id number]]  (Normal) Collected: 10/01/24 1813    Lab Status: Final result Specimen: Blood from Arm, Left Updated: 10/01/24 2136     Magnesium 2.3 mg/dL     Potassium [[id number]]     Lab Status: No result Specimen: Blood     Potassium [[id number]]     Lab Status: No result Specimen: Blood     HS Troponin I 4hr [[id number]]  (Normal) Collected: 10/01/24 2043    Lab Status: Final result Specimen: Blood from Arm, Left Updated: 10/01/24 2112     hs TnI 4hr 3 ng/L      Delta 4hr hsTnI -1 ng/L     Blood culture #1 [[id number]] Collected: 10/01/24 1650    Lab Status: Preliminary result Specimen: Blood from Hand, Right Updated: 10/01/24 2002     Blood Culture Received in Microbiology Lab. Culture in Progress.    Blood culture #2 [[id number]] Collected: 10/01/24 1650    Lab Status: Preliminary result Specimen: Blood from Arm, Left Updated: 10/01/24 2002     Blood Culture Received in Microbiology Lab. Culture in Progress.    HS Troponin I 2hr [[id number]]  (Normal) Collected: 10/01/24 1813    Lab Status: Final result Specimen: Blood from Arm, Left Updated: 10/01/24 1841     hs TnI 2hr 5 ng/L      Delta 2hr hsTnI 1 ng/L     Lactic acid 2 Hours [[id number]]  (Normal) Collected: 10/01/24 1813    Lab Status: Final result Specimen: Blood from Arm, Left Updated:  10/01/24 1837     LACTIC ACID 1.7 mmol/L     Narrative:      Result may be elevated if tourniquet was used during collection.    Comprehensive metabolic panel [[id number]]  (Abnormal) Collected: 10/01/24 1813    Lab Status: Final result Specimen: Blood from Arm, Left Updated: 10/01/24 1837     Sodium 127 mmol/L      Potassium 5.7 mmol/L      Chloride 97 mmol/L      CO2 22 mmol/L      ANION GAP 8 mmol/L      BUN 49 mg/dL      Creatinine 1.85 mg/dL      Glucose 142 mg/dL      Calcium 9.1 mg/dL      AST 45 U/L      ALT 40 U/L      Alkaline Phosphatase 96 U/L      Total Protein 6.5 g/dL      Albumin 3.6 g/dL      Total Bilirubin 1.13 mg/dL      eGFR 25 ml/min/1.73sq m     Narrative:      National Kid[employer] Disease Foundation guidelines for Chronic Kidney Disease (CKD):     Stage 1 with normal or high GFR (GFR > 90 mL/min/1.73 square meters)    Stage 2 Mild CKD (GFR = 60-89 mL/min/1.73 square meters)    Stage 3A Moderate CKD (GFR = 45-59 mL/min/1.73 square meters)    Stage 3B Moderate CKD (GFR = 30-44 mL/min/1.73 square meters)    Stage 4 Severe CKD (GFR = 15-29 mL/min/1.73 square meters)    Stage 5 End Stage CKD (GFR <15 mL/min/1.73 square meters)  Note: GFR calculation is accurate only with a steady state creatinine    Protime-INR [[id number]]  (Abnormal) Collected: 10/01/24 1650    Lab Status: Final result Specimen: Blood from Arm, Left Updated: 10/01/24 1730     Protime 22.8 seconds      INR 1.94    Narrative:      INR Therapeutic Range    Indication                                             INR Range      Atrial Fibrillation                                               2.0-3.0  Hypercoagulable State                                    2.0.2.3  Left Ventricular Asist Device                            2.0-3.0  Mechanical Heart Valve                                  -    Aortic(with afib, MI, embolism, HF, LA enlargement,    and/or coagulopathy)                                     2.0-3.0 (2.5-3.5)     Mitral                                                              2.5-3.5  Prosthetic/Bioprosthetic Heart Valve               2.0-3.0  Venous thromboembolism (VTE: VT, PE        2.0-3.0    APTT [[id number]]  (Normal) Collected: 10/01/24 1650    Lab Status: Final result Specimen: Blood from Arm, Left Updated: 10/01/24 1730     PTT 28 seconds     Procalcitonin [[id number]]  (Normal) Collected: 10/01/24 1650    Lab Status: Final result Specimen: Blood from Arm, Left Updated: 10/01/24 1722     Procalcitonin 0.14 ng/ml     HS Troponin 0hr (reflex protocol) [[id number]]  (Normal) Collected: 10/01/24 1650    Lab Status: Final result Specimen: Blood from Arm, Left Updated: 10/01/24 1720     hs TnI 0hr 4 ng/L     B-Type Natriuretic Peptide(BNP) [[id number]]  (Abnormal) Collected: 10/01/24 1650    Lab Status: Final result Specimen: Blood from Arm, Left Updated: 10/01/24 1719     BNP 1,039 pg/mL     Blood gas, venous [[id number]]  (Abnormal) Collected: 10/01/24 1650    Lab Status: Final result Specimen: Blood from Arm, Left Updated: 10/01/24 1717     pH, Dl 7.308     pCO2, Dl 41.9 mm Hg      pO2, Dl 32.9 mm Hg      HCO3, Dl 20.5 mmol/L      Base Excess, Dl -5.5 mmol/L      O2 Content, Dl 9.8 ml/dL      O2 HGB, VENOUS 52.5 %     Lactic acid [[id number]]  (Abnormal) Collected: 10/01/24 1650    Lab Status: Final result Specimen: Blood from Arm, Left Updated: 10/01/24 1714     LACTIC ACID 2.2 mmol/L     Narrative:      Result may be elevated if tourniquet was used during collection.    CBC and differential [[id number]]  (Abnormal) Collected: 10/01/24 1650    Lab Status: Final result Specimen: Blood from Arm, Left Updated: 10/01/24 1659     WBC 8.33 Thousand/uL      RBC 4.50 Million/uL      Hemoglobin 12.1 g/dL      Hematocrit 38.8 %      MCV 86 fL      MCH 26.9 pg      MCHC 31.2 g/dL      RDW 19.3 %      MPV 9.9 fL      Platelets 294 Thousands/uL      nRBC 0 /100 WBCs      Segmented % 61 %      Immature Grans % 1 %      Lymphocytes % 22 %       Monocytes % 13 %      Eosinophils Relative 2 %      Basophils Relative 1 %      Absolute Neutrophils 5.22 Thousands/µL      Absolute Immature Grans 0.04 Thousand/uL      Absolute Lymphocytes 1.79 Thousands/µL      Absolute Monocytes 1.07 Thousand/µL      Eosinophils Absolute 0.16 Thousand/µL      Basophils Absolute 0.05 Thousands/µL             CT chest without contrast   Final Interpretation by Smooth Hess MD (10/01 1915)      Interval placement of a right pleural drainage catheter with near complete resolution of right pleural effusion and only a small volume of fluid remaining posteriorly. Small amount of air has been introduced into the right pleural cavity consistent with    a small right hydropneumothorax.      Interval increased size of a left pleural effusion.      Bibasilar atelectasis.      Stable anterior mediastinal cystic nodule measuring 1.5 cm of uncertain etiology. Differential discussed above.      Increased perihepatic and perisplenic ascites.      Acute left anterolateral fractures involving the fifth, sixth, seventh, eighth ribs. Correlate for any recent falls.            Workstation performed: [id number]         XR chest pa and lateral   ED Interpretation by Jono Cárdenas MD (10/01 1759)   Improved right-sided pleural effusion, new left-sided consolidation at left lower base concerning for possible pneumonia.  Independently interpreted by myself.          ECG 12 Lead Documentation Only    Date/Time: 10/1/2024 5:59 PM    Performed by: Jono Cárdenas MD  Authorized by: Jono Cárdenas MD    Indications / Diagnosis:  Respiratory distress  ECG reviewed by me, the ED Provider: yes    Patient location:  ED  Previous ECG:     Previous ECG:  Compared to current    Similarity:  No change  Interpretation:     Interpretation: abnormal    Rate:     ECG rate:  62    ECG rate assessment: normal    Rhythm:     Rhythm: paced    Pacing:     Capture:  Complete    Type of pacing:  AV  Ectopy:      Ectopy: none    QRS:     QRS axis:  Indeterminate    QRS intervals:  Wide  Conduction:     Conduction: abnormal    ST segments:     ST segments:  Normal  T waves:     T waves: normal    Comments:      Dual paced rhythm at 62 bpm, no evidence of Sgarbossa's criteria.      ED Medication and Procedure Management   Prior to Admission Medications   Prescriptions Last Dose Informant Patient Reported? Taking?   Acetaminophen (TYLENOL 8 HOUR PO) 10/1/2024  Yes Yes   Sig: Take 975 mg by mouth   Cetirizine HCl (ZyrTEC Allergy) 10 MG CAPS 9/30/2024 Self Yes Yes   Sig: Take 5 mg by mouth in the morning   Cholecalciferol 50 MCG (2000 UT) CAPS 10/1/2024 Self Yes Yes   Sig: Take 2,000 Units by mouth 2 (two) times a day   Insulin Glargine Solostar (Lantus SoloStar) [facility] 9/30/2024 Self No Yes   Sig: Inject 0.1 mL (10 Units total) under the skin daily at bedtime   Insulin Pen Needle 31G X 4 MM MISC 9/30/2024 Self No Yes   Sig: Use daily at bedtime   OneTouch Delica Lancets 33G MISC 9/30/2024 Self No Yes   Sig: Check blood sugars once daily. Please substitute with appropriate alternative as covered by patient's insurance. Dx: E11.65   TURMERIC PO 9/30/2024 Self Yes Yes   Sig: Take 1 capsule by mouth 2 (two) times a day   albuterol (2.5 mg/3 mL) 0.083 % nebulizer solution 10/1/2024 Self No Yes   Sig: Take 3 mL (2.5 mg total) by nebulization 4 (four) times a day   albuterol (ProAir HFA) 90 mcg/act inhaler Past Week Self No Yes   Sig: Inhale 2 puffs every 6 (six) hours as needed for wheezing   apixaban (ELIQUIS) 5 mg 10/1/2024 Self No Yes   Sig: Take 1 tablet (5 mg total) by mouth 2 (two) times a day   ascorbic Acid (VITAMIN C) 500 MG CPCR 9/30/2024 Self Yes Yes   Sig: Take 500 mg by mouth daily   benzonatate (TESSALON PERLES) 100 mg capsule 10/1/2024 Self No Yes   Sig: Take 1 capsule (100 mg total) by mouth 3 (three) times a day as needed for cough   bumetanide (BUMEX) 2 mg tablet 10/1/2024 Self No Yes   Sig: Take 1 tablet  (2 mg total) by mouth daily   dapagliflozin 5 MG TABS 10/1/2024 Self No Yes   Sig: Take 1 tablet (5 mg total) by mouth daily   diltiazem (CARDIZEM CD) 120 mg 24 hr capsule 10/1/2024 Self Yes Yes   Sig: Take 120 mg by mouth daily   ezetimibe (ZETIA) 10 mg tablet 2024 Self No Yes   Sig: Take 1 tablet (10 mg total) by mouth daily at bedtime   famotidine (PEPCID) 20 mg tablet 2024 Self Yes Yes   Sig: Take 20 mg by mouth daily M, W, F in the AM   flecainide (TAMBOCOR) 150 MG tablet 10/1/2024 Self No Yes   Sig: Take 1 tablet (150 mg total) by mouth 2 (two) times a day   gabapentin (NEURONTIN) 300 mg capsule 2024 Self No Yes   Sig: take 1 capsule by mouth at bedtime   glucose blood (OneTouch Verio) test strip 2024 Self No Yes   Sig: Check blood sugars once daily. Please substitute with appropriate alternative as covered by patient's insurance. Dx: E11.65   guaiFENesin (ROBITUSSIN) 100 MG/5ML oral liquid 2024  Yes Yes   Sig: Take 100 mg by mouth 3 (three) times a day as needed for cough   ipratropium (ATROVENT) 0.03 % nasal spray 10/1/2024 Self No Yes   Si sprays into each nostril 3 (three) times a day Begin once per day, then progress to twice per day. Progress to three times a day as tolerated.   metoprolol succinate (TOPROL-XL) 25 mg 24 hr tablet 10/1/2024 Self No Yes   Sig: take 3 tablets by mouth every 12 hours   oxyCODONE (OXY-IR) 5 MG capsule Unknown  Yes No   Sig: Take 2.5 mg by mouth every 6 (six) hours as needed for moderate pain   rOPINIRole (REQUIP) 1 mg tablet 2024 Self No Yes   Sig: Take 2 tablets (2 mg total) by mouth daily at bedtime   tiotropium (Spiriva Respimat) 2.5 MCG/ACT AERS inhaler 10/1/2024 Self No Yes   Sig: Inhale 2 puffs daily   zolpidem (AMBIEN) 10 mg tablet 2024 Self No Yes   Sig: Take 1 tablet (10 mg total) by mouth daily at bedtime      Facility-Administered Medications: None     Current Discharge Medication List        CONTINUE these medications which  have NOT CHANGED    Details   Acetaminophen (TYLENOL 8 HOUR PO) Take 975 mg by mouth      albuterol (2.5 mg/3 mL) 0.083 % nebulizer solution Take 3 mL (2.5 mg total) by nebulization 4 (four) times a day    Associated Diagnoses: Shortness of breath      albuterol (ProAir HFA) 90 mcg/act inhaler Inhale 2 puffs every 6 (six) hours as needed for wheezing  Qty: 8.5 g, Refills: 3    Comments: Substitution to a formulary equivalent within the same pharmaceutical class is authorized.  Associated Diagnoses: Chronic cough      apixaban (ELIQUIS) 5 mg Take 1 tablet (5 mg total) by mouth 2 (two) times a day  Qty: 60 tablet, Refills: 3    Comments: Lot # [id number]  Associated Diagnoses: Paroxysmal atrial fibrillation ([facility])      ascorbic Acid (VITAMIN C) 500 MG CPCR Take 500 mg by mouth daily      benzonatate (TESSALON PERLES) 100 mg capsule Take 1 capsule (100 mg total) by mouth 3 (three) times a day as needed for cough  Qty: 20 capsule, Refills: 0    Associated Diagnoses: Chronic diastolic CHF (congestive heart failure) ([facility])      bumetanide (BUMEX) 2 mg tablet Take 1 tablet (2 mg total) by mouth daily  Qty: 30 tablet, Refills: 1    Associated Diagnoses: Acute exacerbation of CHF (congestive heart failure) ([facility]); Chronic heart failure with preserved ejection fraction ([facility])      Cetirizine HCl (ZyrTEC Allergy) 10 MG CAPS Take 5 mg by mouth in the morning      Cholecalciferol 50 MCG (2000 UT) CAPS Take 2,000 Units by mouth 2 (two) times a day      dapagliflozin 5 MG TABS Take 1 tablet (5 mg total) by mouth daily  Qty: 30 tablet, Refills: 5    Associated Diagnoses: Chronic diastolic CHF (congestive heart failure) ([facility]); Type 2 diabetes mellitus with microalbuminuria, without long-term current use of insulin ([facility])      diltiazem (CARDIZEM CD) 120 mg 24 hr capsule Take 120 mg by mouth daily      ezetimibe (ZETIA) 10 mg tablet Take 1 tablet (10 mg total) by mouth daily at bedtime  Qty: 30 tablet, Refills: 0    Associated Diagnoses:  Hyperlipidemia      famotidine (PEPCID) 20 mg tablet Take 20 mg by mouth daily M, W, F in the AM      flecainide (TAMBOCOR) 150 MG tablet Take 1 tablet (150 mg total) by mouth 2 (two) times a day  Qty: 30 tablet, Refills: 0    Associated Diagnoses: Paroxysmal A-fib (HCC)      gabapentin (NEURONTIN) 300 mg capsule take 1 capsule by mouth at bedtime  Qty: 90 capsule, Refills: 1    Associated Diagnoses: Peripheral polyneuropathy      glucose blood (OneTouch Verio) test strip Check blood sugars once daily. Please substitute with appropriate alternative as covered by patient's insurance. Dx: E11.65  Qty: 100 each, Refills: 3    Associated Diagnoses: Type 2 diabetes mellitus with microalbuminuria, without long-term current use of insulin ([facility])      guaiFENesin (ROBITUSSIN) 100 MG/5ML oral liquid Take 100 mg by mouth 3 (three) times a day as needed for cough      Insulin Glargine Solostar (Lantus SoloStar) 100 UNIT/ML SOPN Inject 0.1 mL (10 Units total) under the skin daily at bedtime  Qty: 15 mL, Refills: 1    Associated Diagnoses: Type 2 diabetes mellitus with microalbuminuria, without long-term current use of insulin ([facility])      Insulin Pen Needle 31G X 4 MM MISC Use daily at bedtime  Qty: 100 each, Refills: 2    Associated Diagnoses: Type 2 diabetes mellitus with microalbuminuria, without long-term current use of insulin ([facility])      ipratropium (ATROVENT) 0.03 % nasal spray 2 sprays into each nostril 3 (three) times a day Begin once per day, then progress to twice per day. Progress to three times a day as tolerated.  Qty: 90 mL, Refills: 3    Associated Diagnoses: Vasomotor rhinitis; Chronic rhinitis      metoprolol succinate (TOPROL-XL) 25 mg 24 hr tablet take 3 tablets by mouth every 12 hours  Qty: 540 tablet, Refills: 2    Associated Diagnoses: Paroxysmal atrial fibrillation (HCC)      OneTouch Delica Lancets 33G MISC Check blood sugars once daily. Please substitute with appropriate alternative as covered by patient's  insurance. Dx: E11.65  Qty: 100 each, Refills: 3    Associated Diagnoses: Type 2 diabetes mellitus with microalbuminuria, without long-term current use of insulin ([facility])      rOPINIRole (REQUIP) 1 mg tablet Take 2 tablets (2 mg total) by mouth daily at bedtime  Qty: 180 tablet, Refills: 1    Associated Diagnoses: RLS (restless legs syndrome)      tiotropium (Spiriva Respimat) 2.5 MCG/ACT AERS inhaler Inhale 2 puffs daily  Qty: 4 g, Refills: 2    Associated Diagnoses: Chronic rhinitis; Seasonal allergic rhinitis due to pollen; Chronic cough      TURMERIC PO Take 1 capsule by mouth 2 (two) times a day      zolpidem (AMBIEN) 10 mg tablet Take 1 tablet (10 mg total) by mouth daily at bedtime  Qty: 14 tablet, Refills: 0    Associated Diagnoses: Insomnia due to medical condition      oxyCODONE (OXY-IR) 5 MG capsule Take 2.5 mg by mouth every 6 (six) hours as needed for moderate pain           No discharge procedures on file.  ED SEPSIS DOCUMENTATION   Time reflects when diagnosis was documented in both MDM as applicable and the Disposition within this note       Time User Action Codes Description Comment    10/1/2024  8:00 PM de Jono Saavedra Add [J96.01] Acute respiratory failure with hypoxia ([facility])     10/1/2024  8:00 PM de Jono Saavedra Add [I50.9] CHF (congestive heart failure) ([facility])     10/1/2024  8:00 PM de Jono Saavedra Add [J90] Pleural effusion     10/1/2024  8:00 PM de Jono Saavedra Add [E87.1] Hyponatremia     10/1/2024  8:00 PM de Jono Saavedra M Add [E87.5] Hyperkalemia     10/1/2024  8:00 PM de Jono Saavedra Add [N17.9] KIMBERLEY (acute kidney injury) ([facility])     10/1/2024  8:01 PM de Jono Saavedra M Add [E87.20] Lactic acidosis            Initial Sepsis Screening       Row Name 10/01/24 1502                Is the patient's history suggestive of a new or worsening infection? Yes (Proceed)  -TD        Suspected source of infection pneumonia  -TD        Indicate SIRS criteria Hyperthemia > 38.3C  "(100.9F) OR Hypothermia <36C (96.8F);Tachypnea > 20 resp per min  -TD        Are two or more of the above signs & symptoms of infection both present and new to the patient? Yes (Proceed)  -TD        Assess for evidence of organ dysfunction: Are any of the below criteria present within 6 hours of suspected infection and SIRS criteria that are NOT considered to be chronic conditions? New need for invasive/non-invasive ventilation  -TD        Date of presentation of severe sepsis 10/01/24  -TD        Time of presentation of severe sepsis 1728  -TD        Sepsis Note: Click \"NEXT\" below (NOT \"close\") to generate sepsis note based on above information. YES (proceed by clicking \"NEXT\")  -TD                  User Key  (r) = Recorded By, (t) = Taken By, (c) = Cosigned By      Initials Name Provider Type    TD Jono Cárdenas MD Resident                       Jono Cárdenas MD  10/01/24 4565    "

## 2024-10-01 NOTE — SEPSIS NOTE
"  Sepsis Note   Farnaz Martin 79 y.o. female MRN: [id number]  Unit/Bed#: ED-29 Encounter: [id number]       Initial Sepsis Screening       Row Name 10/01/24 1727                Is the patient's history suggestive of a new or worsening infection? Yes (Proceed)  -TD        Suspected source of infection pneumonia  -TD        Indicate SIRS criteria Hyperthemia > 38.3C (100.9F) OR Hypothermia <36C (96.8F);Tachypnea > 20 resp per min  -TD        Are two or more of the above signs & symptoms of infection both present and new to the patient? Yes (Proceed)  -TD        Assess for evidence of organ dysfunction: Are any of the below criteria present within 6 hours of suspected infection and SIRS criteria that are NOT considered to be chronic conditions? New need for invasive/non-invasive ventilation  -TD        Date of presentation of severe sepsis 10/01/24  -TD        Time of presentation of severe sepsis 1728  -TD        Sepsis Note: Click \"NEXT\" below (NOT \"close\") to generate sepsis note based on above information. YES (proceed by clicking \"NEXT\")  -TD                  User Key  (r) = Recorded By, (t) = Taken By, (c) = Cosigned By      Initials Name Provider Type    TD Jono Cárdenas MD Resident                      Patient with chronic history of CHF, BNP in the thousands, will not provide 30 cc/kg bolus due to concern for worsening respiratory status.  There is no height or weight on file to calculate BMI.  Wt Readings from Last 1 Encounters:   09/10/24 68.5 kg (151 lb)        Ideal body weight: 50.1 kg (110 lb 7.2 oz)  Adjusted ideal body weight: 57.5 kg (126 lb 10.7 oz)    "

## 2024-10-01 NOTE — PROGRESS NOTES
[facility] at [address]  [place]  [address]  [phone]    Acute Visit Note  LTC: POS 32    ASSESSMENT AND PLAN:  1. Recurrent pleural effusion on right  Assessment & Plan:  History of right sided pleural effusion in the setting of bronchiectasis and pulmonary hypertension.  Status post thoracentesis inpatient with pleural drain placement.  Increased sob reported yesterday. Pleural drain emptied for 450 ml, inhaler given and patient placed on non-rebreather mask with reported relief.  Order given to drain catheter again today.   Monitor respiratory status closely  Monitor for signs/symptoms of infection  Repeat blood work ordered for the am   2. Acute on chronic diastolic heart failure (HCC)  Assessment & Plan:  Patient appears euvolemic in examination  Weight for the past week stable. Weight on  was 156.3 lbs, today weight is 154.6 lbs.  BNP decreased from 1270 on 24 to 1118 on 3034  Continue Bumex and toprol    Will continue to monitor for volume overload.   Repeat blood work ordered for the am        Name: Farnaz Martin                 : 1945               Sex: female    HPI:    79-year-old patient seen and examined today for report of increased shortness of breath yesterday. She was reported to be at 86% on room air. She was placed on non-rebreather mask, given inhaler  and her pleural catheter was drained for 450 ml with reported relief. Upon examination today, patient is calm, cooperative and in no acute distress. She currently denies shortness of breath at rest, she states that when she exerts herself, she feels sob.     The following portions of the patient's history were reviewed and updated as appropriate: allergies, current medications, past family history, past medical history, past social history, past surgical history and problem list.    ROS: Review of Systems   Constitutional:  Negative for chills, fatigue and fever.    Respiratory:  Positive for shortness of breath. Negative for wheezing.         Dyspnea with exertion   Cardiovascular:  Negative for chest pain and palpitations.   Gastrointestinal:  Negative for abdominal pain.   Musculoskeletal:  Positive for arthralgias.        Left rib pain   Neurological:  Negative for dizziness and light-headedness.       Allergies   Allergen Reactions    Sotalol Other (See Comments)     Prolonged QTc leading to torsades de pointes     Penicillins Other (See Comments)     As a child calcium deposit in the arm     Ace Inhibitors GI Intolerance     Did feel well on it    Omeprazole Abdominal Pain, Rash and Vomiting     stomach pain, vomiting, rash       Medications:    No current facility-administered medications on file prior to visit.     Current Outpatient Medications on File Prior to Visit   Medication Sig Dispense Refill    albuterol (2.5 mg/3 mL) 0.083 % nebulizer solution Take 3 mL (2.5 mg total) by nebulization 4 (four) times a day      albuterol (ProAir HFA) 90 mcg/act inhaler Inhale 2 puffs every 6 (six) hours as needed for wheezing 8.5 g 3    apixaban (ELIQUIS) 5 mg Take 1 tablet (5 mg total) by mouth 2 (two) times a day 60 tablet 3    ascorbic Acid (VITAMIN C) 500 MG CPCR Take 500 mg by mouth daily      benzonatate (TESSALON PERLES) 100 mg capsule Take 1 capsule (100 mg total) by mouth 3 (three) times a day as needed for cough 20 capsule 0    bumetanide (BUMEX) 2 mg tablet Take 1 tablet (2 mg total) by mouth daily 30 tablet 1    Cetirizine HCl (ZyrTEC Allergy) 10 MG CAPS Take 5 mg by mouth in the morning      Cholecalciferol 50 MCG (2000 UT) CAPS Take 2,000 Units by mouth 2 (two) times a day      dapagliflozin 5 MG TABS Take 1 tablet (5 mg total) by mouth daily 30 tablet 5    diltiazem (CARDIZEM CD) 120 mg 24 hr capsule Take 120 mg by mouth daily      ezetimibe (ZETIA) 10 mg tablet Take 1 tablet (10 mg total) by mouth daily at bedtime 30 tablet 0    famotidine (PEPCID) 20 mg tablet  Take 20 mg by mouth daily M, W, F in the AM      flecainide (TAMBOCOR) 150 MG tablet Take 1 tablet (150 mg total) by mouth 2 (two) times a day 30 tablet 0    gabapentin (NEURONTIN) 300 mg capsule take 1 capsule by mouth at bedtime 90 capsule 1    glucose blood (OneTouch Verio) test strip Check blood sugars once daily. Please substitute with appropriate alternative as covered by patient's insurance. Dx: E11.65 100 each 3    Insulin Glargine Solostar (Lantus SoloStar) 100 UNIT/ML SOPN Inject 0.1 mL (10 Units total) under the skin daily at bedtime 15 mL 1    Insulin Pen Needle 31G X 4 MM MISC Use daily at bedtime 100 each 2    ipratropium (ATROVENT) 0.03 % nasal spray 2 sprays into each nostril 3 (three) times a day Begin once per day, then progress to twice per day. Progress to three times a day as tolerated. 90 mL 3    metoprolol succinate (TOPROL-XL) 25 mg 24 hr tablet take 3 tablets by mouth every 12 hours 540 tablet 2    OneTouch Delica Lancets 33G MISC Check blood sugars once daily. Please substitute with appropriate alternative as covered by patient's insurance. Dx: E11.65 100 each 3    rOPINIRole (REQUIP) 1 mg tablet Take 2 tablets (2 mg total) by mouth daily at bedtime 180 tablet 1    tiotropium (Spiriva Respimat) 2.5 MCG/ACT AERS inhaler Inhale 2 puffs daily 4 g 2    TURMERIC PO Take 1 capsule by mouth 2 (two) times a day      zolpidem (AMBIEN) 10 mg tablet Take 1 tablet (10 mg total) by mouth daily at bedtime 14 tablet 0       History:  Past Medical History:   Diagnosis Date    Actinic keratosis     last assessed - 06Jun2014    Arthritis     Atrial fibrillation with rapid ventricular response (HCC)     last assessed - 26Apr2016    Atrial fibrillation with rapid ventricular response (HCC) 04/19/2016    Basal cell carcinoma     Benign colon polyp     last assessed - 27Apr2015    Bronchiectasis without complication (HCC)     CHF (congestive heart failure) (HCC) 06/2022    Chronic diastolic CHF (congestive heart  failure) (HCC)     Disease of thyroid gland     Effusion of knee joint right     Resolved - [id number]    Esophageal reflux     Fibromyalgia     last assessed - [id number]    Fibromyalgia, primary     GERD (gastroesophageal reflux disease)     Hyperlipidemia     Hypertension     Hyponatremia     Malignant neoplasm of thyroid gland (HCC)     Meningioma (HCC)     MGUS (monoclonal gammopathy of unknown significance)     Palpitations     last assessed - [id number]    Peroneal tendonitis, right     last assessed - [id number]    Right lumbar radiculopathy 03/17/2016    Thyroid cancer (HCC)     Thyroid nodule     Trochanteric bursitis of left hip 03/09/2018     Past Surgical History:   Procedure Laterality Date    CARDIAC ELECTROPHYSIOLOGY STUDY AND ABLATION  08/2022    CATARACT EXTRACTION Bilateral     COLONOSCOPY  03/2018    COLONOSCOPY W/ POLYPECTOMY  2021    repeat in 5 years    EYE SURGERY      IR PLEURAL EFFUSION LONG-TERM CATHETER PLACEMENT  8/19/2024    IR THORACENTESIS  7/27/2024    IR THORACENTESIS  8/13/2024    OOPHORECTOMY      CA NEUROPLASTY &/TRANSPOS MEDIAN NRV CARPAL TUNNE Right 11/14/2019    Procedure: RELEASE CARPAL TUNNEL;  Surgeon: Onesimo Tate MD;  [facility];  Service: Orthopedics    CA TOTAL THYROID LOBECTOMY UNI W/WO ISTHMUSECTOMY Left 12/16/2020    Procedure: Left THYROID LOBECTOMY;  Surgeon: Jhony Bhatt MD;  [facility];  Service: ENT    RECTAL POLYPECTOMY      REPLACEMENT TOTAL KNEE Left     last assessed - 27Apr2015    TONSILLECTOMY      TOTAL ABDOMINAL HYSTERECTOMY      TUBAL LIGATION      US GUIDED THYROID BIOPSY  10/14/2020     Family History   Problem Relation Age of Onset    Heart disease Mother     Diabetes Mother     Heart disease Father     Coronary artery disease Father     Stroke Father         cerebrovascular accident    Heart attack Father         myocardial infarction    Sudden death Father         scd    Other Family         Back disorder    Coronary artery disease  Family     Neuropathy Family     Osteoporosis Family     No Known Problems Daughter     No Known Problems Maternal Grandmother     No Known Problems Maternal Grandfather     No Known Problems Paternal Grandmother     No Known Problems Paternal Grandfather     Cancer Maternal Uncle     Breast cancer Maternal Aunt 65    No Known Problems Son     No Known Problems Maternal Aunt     No Known Problems Maternal Aunt     No Known Problems Maternal Aunt     No Known Problems Paternal Aunt     No Known Problems Paternal Aunt     Anuerysm Neg Hx     Clotting disorder Neg Hx     Arrhythmia Neg Hx     Heart failure Neg Hx      Social History     Socioeconomic History    Marital status:      Spouse name: Not on file    Number of children: Not on file    Years of education: Not on file    Highest education level: Not on file   Occupational History    Not on file   Tobacco Use    Smoking status: Never    Smokeless tobacco: Never   Vaping Use    Vaping status: Never Used   Substance and Sexual Activity    Alcohol use: Not Currently    Drug use: Never    Sexual activity: Not Currently   Other Topics Concern    Not on file   Social History Narrative    ** Merged History Encounter **          Social Determinants of Health     Financial Resource Strain: Patient Unable To Answer (12/19/2023)    Overall Financial Resource Strain (CARDIA)     Difficulty of Paying Living Expenses: Patient unable to answer   Food Insecurity: No Food Insecurity (10/3/2024)    Hunger Vital Sign     Worried About Running Out of Food in the Last Year: Never true     Ran Out of Food in the Last Year: Never true   Transportation Needs: No Transportation Needs (10/3/2024)    PRAPARE - Transportation     Lack of Transportation (Medical): No     Lack of Transportation (Non-Medical): No   Physical Activity: Not on file   Stress: Not on file   Social Connections: Not on file   Intimate Partner Violence: Not At Risk (7/31/2023)    Received [facility]  [facility], [facility]    Humiliation, Afraid, Rape, and Kick questionnaire     Fear of Current or Ex-Partner: No     Emotionally Abused: No     Physically Abused: No     Sexually Abused: No   Housing Stability: Low Risk  (10/3/2024)    Housing Stability Vital Sign     Unable to Pay for Housing in the Last Year: No     Number of Times Moved in the Last Year: 0     Homeless in the Last Year: No     Past Surgical History:   Procedure Laterality Date    CARDIAC ELECTROPHYSIOLOGY STUDY AND ABLATION  08/2022    CATARACT EXTRACTION Bilateral     COLONOSCOPY  03/2018    COLONOSCOPY W/ POLYPECTOMY  2021    repeat in 5 years    EYE SURGERY      IR PLEURAL EFFUSION LONG-TERM CATHETER PLACEMENT  8/19/2024    IR THORACENTESIS  7/27/2024    IR THORACENTESIS  8/13/2024    OOPHORECTOMY      SC NEUROPLASTY &/TRANSPOS MEDIAN NRV CARPAL TUNNE Right 11/14/2019    Procedure: RELEASE CARPAL TUNNEL;  Surgeon: Onesimo Tate MD;  [facility];  Service: Orthopedics    SC TOTAL THYROID LOBECTOMY UNI W/WO ISTHMUSECTOMY Left 12/16/2020    Procedure: Left THYROID LOBECTOMY;  Surgeon: Jhony Bhatt MD;  [facility];  Service: ENT    RECTAL POLYPECTOMY      REPLACEMENT TOTAL KNEE Left     last assessed - 27Apr2015    TONSILLECTOMY      TOTAL ABDOMINAL HYSTERECTOMY      TUBAL LIGATION      US GUIDED THYROID BIOPSY  10/14/2020       OBJECTIVE:  Vital Signs:  /73, HR 79,Temp 97.1, RR 20,SpO2 94% RA, Wgt: 154.6 lbs   Physical Exam  Vitals and nursing note reviewed.   Constitutional:       General: She is not in acute distress.     Appearance: Normal appearance. She is not ill-appearing, toxic-appearing or diaphoretic.   HENT:      Head: Normocephalic and atraumatic.      Right Ear: External ear normal.      Left Ear: External ear normal.   Cardiovascular:      Rate and Rhythm: Normal rate and regular rhythm.      Pulses: Normal pulses.   Pulmonary:      Effort: Pulmonary effort is normal. No respiratory  distress.      Breath sounds: Normal breath sounds. No wheezing, rhonchi or rales.   Abdominal:      General: Bowel sounds are normal.      Palpations: Abdomen is soft.   Musculoskeletal:         General: Tenderness present. Normal range of motion.      Comments: Left lateral rib tenderness   Skin:     General: Skin is warm and dry.   Neurological:      Mental Status: She is alert. Mental status is at baseline.      Motor: No weakness.   Psychiatric:         Mood and Affect: Mood normal.         Behavior: Behavior normal.         Thought Content: Thought content normal.         Labs & Imaging Reviewed in EMR.     JENNY Monroy  Geriatric Medicine  09/30/2024

## 2024-10-02 PROBLEM — M79.642 LEFT HAND PAIN: Status: ACTIVE | Noted: 2024-10-02

## 2024-10-02 PROBLEM — S22.42XD CLOSED FRACTURE OF MULTIPLE RIBS OF LEFT SIDE WITH ROUTINE HEALING: Status: ACTIVE | Noted: 2024-01-01

## 2024-10-02 PROBLEM — J96.01 ACUTE HYPOXIC RESPIRATORY FAILURE (HCC): Status: ACTIVE | Noted: 2021-01-03

## 2024-10-02 PROBLEM — I73.00 RAYNAUD PHENOMENON: Status: ACTIVE | Noted: 2024-01-01

## 2024-10-02 PROBLEM — M25.561 RIGHT KNEE PAIN: Status: ACTIVE | Noted: 2024-01-01

## 2024-10-02 PROBLEM — E87.5 HYPERKALEMIA: Status: RESOLVED | Noted: 2024-01-01 | Resolved: 2024-01-01

## 2024-10-02 NOTE — ASSESSMENT & PLAN NOTE
Wt Readings from Last 3 Encounters:   10/02/24 104 kg (228 lb 6.3 oz)   09/10/24 68.5 kg (151 lb)   08/23/24 70.3 kg (155 lb)   BNP: 1039  I&O: today -985 so far, since admission -1715  Dry weight: 150-160 LB, updated weight 162 standing scale 10/2  Patient has chronic indwelling pleural catheter   CT chest 10/1 shows decreasing volume of right-sided pleural effusion but increased volume of left-sided pleural effusion, perisplenic and perihepatic ascites  Per most recent cardiology note patient taking Bumex 2 mg daily  Patient is compliant with the medication, cardiac diet  Echo 7/14/2024 shows LVEF: 50%, pulmonary hypertension findings  Patient has AV paced pacemaker for tachybradycardia syndrome (the most recent interrogation was unremarkable)  Patient has atrial fibrillation  furosemide 40 mg IV once was given in ED  Calculated MELD score 21, 19.6% 3 month mortality     Plan:  Patient will be on 3.25 mg IV Bumex twice daily as per conversation of 2.5 mg fold increment for acute exacerbation of CHF  Will monitor CR as in past admission patient's CR increased with diuresis   Monitor I/O, daily weight  Goal of I/O's -700 to 1 L net in 24-hour  Cardiac diet, fluid restriction, salt restriction  Continue to decrease oxygen demand, currently on 6L mid flow NC  Elevate the head  If diuresis inadequate with current Bumex, consider IR consult

## 2024-10-02 NOTE — ASSESSMENT & PLAN NOTE
Lab Results   Component Value Date    HGBA1C 8.8 (H) 07/13/2024       Recent Labs     10/01/24  2143 10/02/24  0022   POCGLU 108 214*       Blood Sugar Average: Last 72 hrs:  (P) 161    Home dose: Dapagliflozin 5 mg daily, insulin 10 unit glargine bedtime    Plan:  Hold dapagliflozin  SSI  Insulin glargine 5 unit, because of unsure about unsure about oral food intake during hospitalization

## 2024-10-02 NOTE — OCCUPATIONAL THERAPY NOTE
Occupational Therapy Cancellation Note     Patient Name: Farnaz Martin  Today's Date: 10/2/2024     10/02/24 2965   Note Type   Note type Cancelled Session   Additional Comments OT consult received and chart reviewed. Pt c/o of pain in R knee and L UE s/p a fall ~ 2 weeks ago in which patient did not seek care for. R knee x-ray and L hand and wrist x-ray pending. Will hold until offical read is complete and f/u as able.     Anuradha Menendez MS OTR/L   [place] Licensure# [id number]

## 2024-10-02 NOTE — ASSESSMENT & PLAN NOTE
Reports fall suffered approximately 2 weeks ago.  Chart review reveals it likely occurred on 9/15/2024.  No ED visit or hospital evaluation at that time though XRs were obtained at nursing facility which showed no acute injury.

## 2024-10-02 NOTE — PHYSICAL THERAPY NOTE
PHYSICAL THERAPY CANCELLATION NOTE          Patient Name: Farnaz Martin  Today's Date: 10/2/2024             10/02/24 1301   Note Type   Note type Cancelled Session   Additional Comments PT eval orders received, chart review performed. Pt pending xray images of L hand/wrist and R knee. Will hold PT eval and mobilizing pt at this time. PT will continue to follow pt as appropriate and as schedule allows.         Aspen Lobato, PT, DPT  10/02/24

## 2024-10-02 NOTE — ASSESSMENT & PLAN NOTE
Asymptomatic hyperkalemia  most likely reason for hyperkalemia is KIMBERLEY  Not on any medication which can explain hyperkalemia  5.7 on admission, currently normalized to 4.7 and 4.5    Plan:  Telemetry, in the background of high risk of arrhythmia in the background of heart failure with preserved region fraction, A-fib, pacemaker, pulmonary hypertension, will continue telemetry overnight   Potassium normalized   Calcium gluconate, Insulin and dextrose, Albuterol inhaler completed on admission

## 2024-10-02 NOTE — ASSESSMENT & PLAN NOTE
Wt Readings from Last 3 Encounters:   10/02/24 104 kg (228 lb 6.3 oz)   09/10/24 68.5 kg (151 lb)   08/23/24 70.3 kg (155 lb)   Most likely CHF exacerbation  The most recent CT scan did not show any consolidation or pneumonia  BNP: More than 1k  Exudative pleural effusion because of Lysteda positive  Patient had early signs of cirrhosis  Dry weight: 150-160 LB  Patient has chronic indwelling pleural catheter and the most recent CT chest shows decreasing volume of right-sided pleural effusion but increased volume of left-sided pleural effusion, perisplenic and perihepatic ascites  The most common reason for acute hypoxic respiratory failure is not taking Bumex as prescribed  As per the most recent cardiology note, patient was asked to take Bumex 3 mg daily but patient takes Bumex 2 mg daily  Patient is compliant with the medication, cardiac diet  The most recent echo shows LVEF: 50%, pulmonary hypertension findings  Patient has AV paced pacemaker for tachybradycardia syndrome (the most recent interrogation was unremarkable)  Patient has atrial fibrillation    Plan:  Since furosemide 40 mg IV once was given in ED, patient will be on 3.25 mg IV Bumex twice daily as per conversation of 2.5 mg fold increment for acute exacerbation of CHF  Monitor I/O, daily weight  Goal of I/O's -700 to 1 L net in 24-hour  Cardiac diet, fluid restriction, salt restriction  Elevate the head  AFP can be checked  Ultrasound/CT abdomen with contrast/MR abdomen with contrast can be considered to rule out hepatocellular carcinoma as a screening procedure for cirrhosis

## 2024-10-02 NOTE — CONSULTS
"Consultation - Trauma   Name: Farnaz Martin 79 y.o. female I MRN: [id number]  Unit/Bed#: S -01 I Date of Admission: 10/1/2024   Date of Service: 10/2/2024 I Hospital Day: 1   Inpatient consult to Trauma (Inpatient Only)  Consult performed by: JENNY Garcia  Consult ordered by: Brooke Mendelson, DO        Physician Requesting Evaluation: Addison Purvis MD   Reason for Evaluation / Principal Problem: Left-sided rib fractures    Assessment & Plan  Fall  Reports fall suffered approximately 2 weeks ago.  Chart review reveals it likely occurred on 9/15/2024.  No ED visit or hospital evaluation at that time though XRs were obtained at nursing facility which showed no acute injury.  Closed fracture of multiple ribs of left side with routine healing  10/1 patient was admitted to the medical service for acute hypoxia in the setting of CHF.  During this workup CT chest was ordered given history of pleural effusions and current right-sided Pleurx catheter--incidentally found to have \"Acute left anterolateral fractures involving the fifth, sixth, seventh, eighth ribs\".  Trauma team subsequently consulted.  Exam is largely unremarkable, with significant effort can find a small localized area on the left anterior lateral mid ribs.  Patient only subjectively symptomatic with deep breath, though pain experienced she describes as minimal.  Would recommend analgesia as needed-Tylenol and lidocaine patch will likely be sufficient.  May escalate opioids as needed.  Regarding effusion seen on imaging, this is likely associated with CHF and not subacute rib fractures--continue with medical management.  Encourage deep breathing, coughing, incentive spirometry use-currently inspiring approximately 750 mL.  Follow-up with trauma as needed.  No follow-up required given pain being controlled.  Right knee pain  Reports chronic knee pain worsened after fall 2 weeks ago.  She notes pain exacerbated when walking.  XR right knee " "ordered.  Analgesia as needed  PT/OT  Orthopedics as needed.  Left hand pain  1 patient suffered a fall 2 weeks ago she describes impacting her left hand.  Continues to have some pain on left fifth MC that radiates to wrist.  XR right knee pending  Analgesia as needed  PT/OT  Orthopedics as needed    Disposition: Trauma team will continue to follow until XRs result.  If negative trauma team will sign off.  She may follow-up with trauma as needed though given her pain is controlled she likely can just follow-up with her primary care doctor.  Please call with any questions or concerns.    Trauma Alert: Evaluation; trauma team notified at 1000 via text   Model of Arrival: Self    Trauma Team: Attending Amber and TRACY Stephens  Consultants:     None     History of Present Illness   Chief Complaint: \"I do not have any pain\"  Mechanism:Fall     Farnaz Martin is a 79 y.o. female heart failure and atrial fibrillation that she takes Eliquis for, presenting yesterday for evaluation after having her Pleurx catheter drained and developing acute hypoxia.  Patient was ultimately admitted to the medical service for continued monitoring and workup of acute hypoxia.  During this workup she was found to have multiple left-sided rib fractures leading to trauma consultation.    During my evaluation, daughter was at bedside.  Patient and daughter have correlating stories that patient suffered a fall approximately 2 weeks ago at her nursing facility.  She fell forward into packing the front of her body.  No reported head strike or loss of consciousness.  She was seen by the nursing home provider who obtained rib x-rays that showed no acute injury.  Since the event patient has noted some mild left rib tenderness that has progressively decreased in severity.  Patient notes no pain on my exam.  Though as we progressed through the physical exam she did develop a \"little\" twinge of pain with deep breathing and significant palpation of her left " ribs.  She also notes since the fall having left hand and worsened right knee pain.  No other complaints offered.    Review of Systems   Respiratory:  Positive for shortness of breath. Negative for chest tightness and wheezing.    Cardiovascular:  Positive for chest pain. Negative for leg swelling.   Gastrointestinal:  Negative for abdominal distention and abdominal pain.   Musculoskeletal:  Positive for arthralgias.   All other systems reviewed and are negative.    I have reviewed the patient's PMH, PSH, Social History, Family History, Meds, and Allergies  Immunization History   Administered Date(s) Administered    COVID-19 MODERNA VACC 0.5 ML IM 01/27/2021, 01/27/2021, 02/24/2021, 02/24/2021, 11/23/2021    COVID-19 Moderna Vac BIVALENT 12 Yr+ IM 0.5 ML 12/09/2022    COVID-19 Moderna mRNA Vaccine 12 Yr+ 50 mcg/0.5 mL (Spikevax) 01/02/2024    INFLUENZA 12/23/2015, 12/23/2015, 11/07/2016, 11/07/2016, 10/18/2017, 10/18/2017, 12/04/2018, 12/04/2018, 09/26/2019, 09/08/2020, 11/03/2021, 09/19/2022, 09/19/2022, 12/19/2023    Influenza Split High Dose Preservative Free IM 10/01/2014, 12/23/2015, 11/07/2016, 10/18/2017    Influenza, high dose seasonal 0.7 mL 12/04/2018, 09/26/2019, 09/08/2020, 11/03/2021, 09/19/2022, 12/19/2023    Influenza, seasonal, injectable 10/16/2012    Pneumococcal Conjugate 13-Valent 04/27/2015    Pneumococcal Polysaccharide PPV23 03/29/2022, 03/29/2022    Respiratory Syncytial Virus Vaccine (Recombinant) 02/21/2024    Tdap 07/13/2024     Last Tetanus: 2024       Objective :  Temp:  [96.5 °F (35.8 °C)-98.1 °F (36.7 °C)] 97.8 °F (36.6 °C)  HR:  [48-78] 78  BP: (100-136)/(57-82) 104/58  Resp:  [18-24] 20  SpO2:  [77 %-100 %] 87 %  O2 Device: Mid flow nasal cannula    Initial Vitals:   Temperature: (!) 96.5 °F (35.8 °C) (10/01/24 1700)  Pulse: 64 (10/01/24 1637)  Respirations: 20 (10/01/24 1637)  Blood Pressure: 100/71 (10/01/24 1637)    Primary Survey:   Airway:        Status: patent;         Pre-hospital Interventions: none        Hospital Interventions: none  Breathing:        Pre-hospital Interventions: none       Effort: normal       Right breath sounds: normal       Left breath sounds: normal  Circulation:        Rhythm: regular       Rate: regular   Right Pulses Left Pulses    R radial: 2+    R pedal: 2+     L radial: 2+    L pedal: 2+       Disability:        GCS: Eye: 4; Verbal: 5 Motor: 6 Total: 15       Right Pupil: round;  reactive         Left Pupil:  round;  reactive      R Motor Strength L Motor Strength    R : 5/5  R dorsiflex: 5/5  R plantarflex: 5/5 L : 5/5  L dorsiflex: 5/5  L plantarflex: 5/5        Sensory:  No sensory deficit  Exposure:       Completed: Yes      Secondary Survey:  Physical Exam  Vitals reviewed.   Constitutional:       General: She is not in acute distress.     Appearance: She is not ill-appearing.   HENT:      Head: Normocephalic and atraumatic.      Right Ear: External ear normal.      Left Ear: External ear normal.      Nose: Nose normal.      Mouth/Throat:      Mouth: Mucous membranes are moist.      Pharynx: Oropharynx is clear.   Eyes:      Extraocular Movements: Extraocular movements intact.      Pupils: Pupils are equal, round, and reactive to light.   Cardiovascular:      Rate and Rhythm: Normal rate and regular rhythm.      Pulses: Normal pulses.      Heart sounds: Normal heart sounds.   Pulmonary:      Effort: Pulmonary effort is normal.      Comments: Pleurx catheter noted on the right side.  Small localized area of tenderness on left anterior lateral ribs.  Audible Rales on lower portion of left side, scant audible on the right lower lobe.  No palpable deformities, crepitus, or subcutaneous air.  Abdominal:      General: Abdomen is flat. Bowel sounds are normal. There is no distension.      Palpations: Abdomen is soft.      Tenderness: There is no abdominal tenderness. There is no guarding.   Genitourinary:     Comments: Pelvis is  stable  Musculoskeletal:         General: Normal range of motion.      Cervical back: Normal range of motion and neck supple. No rigidity or tenderness.      Comments: Patient moving all extremities.  Right knee and left fifth metacarpal tender on palpation.  No significant associated effusion or contusion.  No C, T, L-spine tenderness.  No palpable step-offs.   Skin:     General: Skin is warm and dry.      Capillary Refill: Capillary refill takes less than 2 seconds.   Neurological:      Mental Status: She is alert and oriented to person, place, and time.      Sensory: No sensory deficit.      Motor: No weakness.   Psychiatric:      Comments: Cooperative.             Lab Results: I have reviewed the following results:  Recent Labs     10/01/24  1650 10/01/24  1650 10/01/24  1813 10/02/24  0047 10/02/24  0505   WBC 8.33  --   --   --  7.53   HGB 12.1  --   --   --  10.4*   HCT 38.8  --   --   --  33.1*     --   --   --  267   SODIUM  --   --  127*   < > 129*   K  --   --  5.7*   < > 4.6  4.5   CL  --    < > 97  --  98   CO2  --    < > 22  --  23   BUN  --    < > 49*  --  50*   CREATININE  --    < > 1.85*  --  1.89*   GLUC  --    < > 142*  --  177*   MG  --    < > 2.3  --  2.1   PHOS  --    < > 6.2*  --  5.6*   AST  --    < > 45*  --  62*   ALT  --    < > 40  --  53*   ALB  --    < > 3.6  --  3.2*   TBILI  --    < > 1.13*  --  0.85   ALKPHOS  --    < > 96  --  82   PTT 28  --   --   --   --    INR 1.94*  --   --   --  1.75*   HSTNI0 4  --   --   --   --    HSTNI2  --   --  5  --   --    BNP 1,039*  --   --   --   --    LACTICACID 2.2*  --  1.7  --   --     < > = values in this interval not displayed.       Imaging Results: I have personally reviewed pertinent images saved in PACS. CT scan findings (and other pertinent positive findings on images) were discussed with radiology. My interpretation of the images/reports are as follows:  Chest Xray(s): negative for acute findings   FAST exam(s): N/A   CT Scan(s):  See below   Additional Xray(s): pending     XR chest pa and lateral    Result Date: 10/2/2024  Impression: Possible trace left pleural effusion with blunted left costophrenic angle. Mild left basilar atelectasis and/or airspace disease is seen. Stable enlarged cardiomediastinal silhouette and left chest cardiac pacing device. Workstation performed: [id number]     CT chest without contrast    Result Date: 10/1/2024  Impression: Interval placement of a right pleural drainage catheter with near complete resolution of right pleural effusion and only a small volume of fluid remaining posteriorly. Small amount of air has been introduced into the right pleural cavity consistent with a small right hydropneumothorax. Interval increased size of a left pleural effusion. Bibasilar atelectasis. Stable anterior mediastinal cystic nodule measuring 1.5 cm of uncertain etiology. Differential discussed above. Increased perihepatic and perisplenic ascites. Acute left anterolateral fractures involving the fifth, sixth, seventh, eighth ribs. Correlate for any recent falls. Workstation performed: [id number]      Other Studies: Other Study Results Review: No additional pertinent studies reviewed.

## 2024-10-02 NOTE — ASSESSMENT & PLAN NOTE
"10/1 patient was admitted to the medical service for acute hypoxia in the setting of CHF.  During this workup CT chest was ordered given history of pleural effusions and current right-sided Pleurx catheter--incidentally found to have \"Acute left anterolateral fractures involving the fifth, sixth, seventh, eighth ribs\".  Trauma team subsequently consulted.  Exam is largely unremarkable, with significant effort can find a small localized area on the left anterior lateral mid ribs.  Patient only subjectively symptomatic with deep breath, though pain experienced she describes as minimal.  Would recommend analgesia as needed-Tylenol and lidocaine patch will likely be sufficient.  May escalate opioids as needed.  Regarding effusion seen on imaging, this is likely associated with CHF and not subacute rib fractures--continue with medical management.  Encourage deep breathing, coughing, incentive spirometry use-currently inspiring approximately 750 mL.  Follow-up with trauma as needed.  No follow-up required given pain being controlled.  "

## 2024-10-02 NOTE — ASSESSMENT & PLAN NOTE
Asymptomatic  hyperkalemia  tHe most likely reason for hyperkalemia is KIMBERLEY  Not on any medication which can explain hyperkalemia  The most recent potassium was 5.7    Plan:  Telemetry, in the background of high risk of arrhythmia in the background of heart failure with preserved region fraction, A-fib, pacemaker, pulmonary hypertension  Potassium check every 4 hours until it becomes normalized 2 times  Calcium gluconate  Insulin and dextrose  Albuterol inhaler

## 2024-10-02 NOTE — OCCUPATIONAL THERAPY NOTE
Occupational Therapy Evaluation     Patient Name: Farnaz Martin  Today's Date: 10/2/2024  Problem List  Active Problems:    Hyponatremia    Acute hypoxic respiratory failure (HCC)    Fall    Atrial fibrillation (HCC)    KIMBERLEY (acute kidney injury) (HCC)    Diabetes mellitus type 2, insulin dependent (HCC)    Raynaud phenomenon    Closed fracture of multiple ribs of left side with routine healing    Right knee pain    Left hand pain    Past Medical History  Past Medical History:   Diagnosis Date    Actinic keratosis     last assessed - 06Jun2014    Arthritis     Atrial fibrillation with rapid ventricular response (HCC)     last assessed - 26Apr2016    Atrial fibrillation with rapid ventricular response (HCC) 04/19/2016    Basal cell carcinoma     Benign colon polyp     last assessed - [id number]    Bronchiectasis without complication (HCC)     CHF (congestive heart failure) (HCC) 06/2022    Chronic diastolic CHF (congestive heart failure) (HCC)     Disease of thyroid gland     Effusion of knee joint right     Resolved - 19Apr2016    Esophageal reflux     Fibromyalgia     last assessed - [id number]    Fibromyalgia, primary     GERD (gastroesophageal reflux disease)     Hyperlipidemia     Hypertension     Hyponatremia     Malignant neoplasm of thyroid gland (HCC)     Meningioma (HCC)     MGUS (monoclonal gammopathy of unknown significance)     Palpitations     last assessed - 30Apr2013    Peroneal tendonitis, right     last assessed - [id number]    Right lumbar radiculopathy 03/17/2016    Thyroid cancer (HCC)     Thyroid nodule     Trochanteric bursitis of left hip 03/09/2018     Past Surgical History  Past Surgical History:   Procedure Laterality Date    CARDIAC ELECTROPHYSIOLOGY STUDY AND ABLATION  08/2022    CATARACT EXTRACTION Bilateral     COLONOSCOPY  03/2018    COLONOSCOPY W/ POLYPECTOMY  2021    repeat in 5 years    EYE SURGERY      IR PLEURAL EFFUSION LONG-TERM CATHETER PLACEMENT  8/19/2024    IR  THORACENTESIS  7/27/2024    IR THORACENTESIS  8/13/2024    OOPHORECTOMY      ID NEUROPLASTY &/TRANSPOS MEDIAN NRV CARPAL TUNNE Right 11/14/2019    Procedure: RELEASE CARPAL TUNNEL;  Surgeon: Onesimo Tate MD;  Location: [facility];  Service: Orthopedics    ID TOTAL THYROID LOBECTOMY UNI W/WO ISTHMUSECTOMY Left 12/16/2020    Procedure: Left THYROID LOBECTOMY;  Surgeon: Jhony Bhatt MD;  Location: [facility];  Service: ENT    RECTAL POLYPECTOMY      REPLACEMENT TOTAL KNEE Left     last assessed - [id number]    TONSILLECTOMY      TOTAL ABDOMINAL HYSTERECTOMY      TUBAL LIGATION      US GUIDED THYROID BIOPSY  10/14/2020        10/02/24 1513   OT Last Visit   OT Visit Date 10/02/24   Note Type   Note type Evaluation   Pain Assessment   Pain Assessment Tool 0-10   Pain Score No Pain   Restrictions/Precautions   Weight Bearing Precautions Per Order No   Other Precautions Cognitive;Chair Alarm;Bed Alarm;Multiple lines;O2;Fall Risk;Pain;Hard of hearing  (8L via midflow)   Home Living   Type of Home   (Prior to STR - pt lived in a 1SH w/ 2STE vs ramp to enter. Walk-in shower w/ shower chair, commode and a raised toilet seat with grab bars around it.)   Additional Comments Admit from [facility] for STR. SBA to ambulate functional distances w/ RW. Independent with dressing, assist for showering / bathing. Pt is in the processing of transitioning to the [facility] section at [facility].   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 (+) falls, (+) driving. Lives in a 1SH, ramped entrance.   Reciprocal Relationships supportive family and facility staff   Service to Others retired   Intrinsic Gratification reading and gardening   General   Additional Pertinent History Pt admit  with chronic worsening shortness of breath and not saturating well on 8 L at nursing care facility. Right knee pain and L hand pain s/p a fall ~2 weeks ago. Imaging (-) for acute injurities. 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   Bed Mobility   Supine to Sit 5  Supervision   Additional items HOB elevated;Bedrails;Increased time required;Verbal cues   Sit to Supine   (NT: OOB to recliner chair)   Transfers   Sit to Stand 4  Minimal assistance   Additional items Assist x 1;Increased time required;Verbal cues   Stand to Sit 5  Supervision   Additional items Armrests;Increased time required;Verbal cues   Stand pivot 4  Minimal assistance   Additional items Assist x 1;Increased time required;Verbal cues  (RW)   Additional Comments Use of RW for support   Functional Mobility   Functional Mobility 4  Minimal assistance   Additional Comments progressing to CGA " w/ RW for functional household distance. No s/s of SOB/. O2 >90% t/o.   Additional items Rolling walker   Balance   Static Sitting Fair +   Dynamic Sitting Fair   Static Standing Poor +   Dynamic Standing Poor +   Activity Tolerance   Activity Tolerance Patient limited by fatigue   Medical Staff Made Aware Spoke with CM, PT, trauma-AP Mikey, pt OK to see.    Nurse Made Aware Spoke with RN pre/post   RUE Assessment   RUE Assessment WFL  (no resistance provided d/t rib fx's)   LUE Assessment   LUE Assessment WFL  (no resistance provided d/t rib fx's)   Hand Function   Gross Motor Coordination Functional   Fine Motor Coordination Functional   Vision-Basic Assessment   Current Vision Wears glasses all the time   Cognition   Overall Cognitive Status Impaired  (WFL during conversation - questionable higher level cognitive deficits vs hard of hearing (?) will continue to assess)   Arousal/Participation Alert;Responsive;Cooperative   Attention Attends with cues to redirect   Orientation Level Oriented to person;Oriented to place;Oriented to situation   Memory Decreased recall of precautions;Decreased short term memory   Following Commands Follows one step commands with increased time or repetition   Comments Pt ID via wristband, name and . Pt is pleasent and cooperative. Questionable insight into current limitiations and deficits.   Assessment   Limitation Decreased ADL status;Decreased UE strength;Decreased cognition;Decreased endurance;Decreased self-care trans;Decreased high-level ADLs   Prognosis Fair   Assessment Patient is a 79 y.o. female seen for OT evaluation following admission on 10/1/2024  s/p shortness of breath. Please see above for comprehensive list of comorbidities and significant PMHx impacting functional performance. Upon initial evaluation, pt appears to be performing below baseline functional status. Pt requires supervision for UB ADLs, JESSICA for LB ADLs, supervision for bed mobility, JESSICA for  transfers and JESSICA progressing to CGA for functional mobility household distance with RW. The AM-PAC & Barthel Index outcome tools were used to assist in determining pt safety w/self care /mobility and appropriate d/c recommendations, see above for score. Occupational performance is affected by the following deficits: endurance ,  decreased muscular strength , decreased standing tolerance for self care tasks , decreased dynamic balance impacting functional reach, decreased activity tolerance , impaired memory , impaired judgement and problem solving , impaired safety awareness , and (+) pain . Personal/Environmental factors impacting D/C include: (+) Hx of falls , Assistance needed for ADL/IADLs, Assistance needed for ADLs and functional mobility, High fall risk , decreased insight toward deficits , decreased recall of precautions , and health management. Supporting factors include: able to maintain FFSU, support system available, facility staff available for continued assistance, and attitude towards recovery . Patient would benefit from OT services within the acute care setting to maximize level of functional independence in the following areas fall prevention , energy conservation , self-care transfers, bed mobility , functional mobility, and ADLs.  From OT standpoint, recommendation at time of D/C would be Level II (Moderate Resource Intensity) .   Goals   Patient Goals to get the group home section of S   LTG Time Frame 10-14   Long Term Goal See below   Plan   Treatment Interventions ADL retraining;Functional transfer training;UE strengthening/ROM;Endurance training;Patient/family training;Cognitive reorientation;Equipment evaluation/education;Compensatory technique education;Energy conservation;Activityengagement   Goal Expiration Date 10/12/24   OT Treatment Day 0   OT Frequency 3-5x/wk   Discharge Recommendation   Rehab Resource Intensity Level, OT II (Moderate Resource Intensity)   AM-PAC Daily Activity  Inpatient   Lower Body Dressing 3   Bathing 3   Toileting 3   Upper Body Dressing 3   Grooming 3   Eating 4   Daily Activity Raw Score 19   Daily Activity Standardized Score (Calc for Raw Score >=11) 40.22   AM-PAC Applied Cognition Inpatient   Following a Speech/Presentation 3   Understanding Ordinary Conversation 4   Taking Medications 2   Remembering Where Things Are Placed or Put Away 2   Remembering List of 4-5 Errands 2   Taking Care of Complicated Tasks 2   Applied Cognition Raw Score 15   Applied Cognition Standardized Score 33.54   Barthel Index   Feeding 10   Bathing 0   Grooming Score 0   Dressing Score 5   Bladder Score 5   Bowels Score 10   Toilet Use Score 5   Transfers (Bed/Chair) Score 10   Mobility (Level Surface) Score 0   Stairs Score 0   Barthel Index Score 45   End of Consult   Education Provided Yes   Patient Position at End of Consult Bedside chair;Bed/Chair alarm activated;All needs within reach   Nurse Communication Nurse aware of consult     GOALS:      -Patient will perform grooming tasks standing at sink with overall Mod I in order to increase overall independence     -Patient will be Mod I with UB dressing using AE and AD as needed in order to increase (I) with ADLs     -Patient will be Mod I with UB bathing using AE and AD as needed in order to increase (I) with ADLs     -Patient will be Mod I with LB dressing with use of AE and AD as needed in order to increase (I) with ADLs     -Patient will be Mod I with LB bathing with use of AE and AD as needed in order to increase (I) with ADLs     -Patient will complete toileting w/ Mod I w/ G hygiene/thoroughness in order to reduce caregiver burden     -Patient will demonstrate Mod I with bed mobility for ability to manage own comfort and initiate OOB tasks.      -Patient will perform functional transfers with Mod I to/from all surfaces using DME as needed in order to increase (I) with functional tasks     -Patient will be Mod I with functional  mobility to/from bathroom for increased independence with toileting tasks     -Patient will tolerate therapeutic activities for greater than 30 min, in order to increase tolerance for functional activities.      -Patient will engage in ongoing cognitive assessment in order to assist with safe discharge planning/recommendations.     -Patient will demonstrate PLB techniques during ADL tasks with min VC     -Patient will demonstrate standing for 5 min in order to increase active participation in functional activities    The patient's raw score on the -PAC Daily Activity Inpatient Short Form is 19. A raw score of greater than or equal to 19 suggests the patient may benefit from discharge to home. Please refer to the recommendation of the Occupational Therapist for safe discharge planning.    Anuradha Menendez MS OTR/L   [place] Licensure# [id number]

## 2024-10-02 NOTE — PLAN OF CARE
Problem: Potential for Falls  Goal: Patient will remain free of falls  Description: INTERVENTIONS:  - Educate patient/family on patient safety including physical limitations  - Instruct patient to call for assistance with activity   - Consult OT/PT to assist with strengthening/mobility   - Keep Call bell within reach  - Keep bed low and locked with side rails adjusted as appropriate  - Keep care items and personal belongings within reach  - Initiate and maintain comfort rounds  - Make Fall Risk Sign visible to staff  - Offer Toileting every 2 Hours, in advance of need  - Initiate/Maintain bed alarm  - Obtain necessary fall risk management equipment: yes  - Apply yellow socks and bracelet for high fall risk patients  - Consider moving patient to room near nurses station  Outcome: Progressing

## 2024-10-02 NOTE — PLAN OF CARE
Problem: OCCUPATIONAL THERAPY ADULT  Goal: Performs self-care activities at highest level of function for planned discharge setting.  See evaluation for individualized goals.  Description: Treatment Interventions: ADL retraining, Functional transfer training, UE strengthening/ROM, Endurance training, Patient/family training, Cognitive reorientation, Equipment evaluation/education, Compensatory technique education, Energy conservation, Activityengagement          See flowsheet documentation for full assessment, interventions and recommendations.   Note: Limitation: Decreased ADL status, Decreased UE strength, Decreased cognition, Decreased endurance, Decreased self-care trans, Decreased high-level ADLs  Prognosis: Fair  Assessment: Patient is a 79 y.o. female seen for OT evaluation following admission on 10/1/2024  s/p shortness of breath. Please see above for comprehensive list of comorbidities and significant PMHx impacting functional performance. Upon initial evaluation, pt appears to be performing below baseline functional status. Pt requires supervision for UB ADLs, JESSICA for LB ADLs, supervision for bed mobility, JESSICA for transfers and JESSICA progressing to CGA for functional mobility household distance with RW. The AM-PAC & Barthel Index outcome tools were used to assist in determining pt safety w/self care /mobility and appropriate d/c recommendations, see above for score. Occupational performance is affected by the following deficits: endurance ,  decreased muscular strength , decreased standing tolerance for self care tasks , decreased dynamic balance impacting functional reach, decreased activity tolerance , impaired memory , impaired judgement and problem solving , impaired safety awareness , and (+) pain . Personal/Environmental factors impacting D/C include: (+) Hx of falls , Assistance needed for ADL/IADLs, Assistance needed for ADLs and functional mobility, High fall risk , decreased insight toward  deficits , decreased recall of precautions , and health management. Supporting factors include: able to maintain FFSU, support system available, facility staff available for continued assistance, and attitude towards recovery . Patient would benefit from OT services within the acute care setting to maximize level of functional independence in the following areas fall prevention , energy conservation , self-care transfers, bed mobility , functional mobility, and ADLs.  From OT standpoint, recommendation at time of D/C would be Level II (Moderate Resource Intensity) .     Rehab Resource Intensity Level, OT: II (Moderate Resource Intensity)     Anuradha Menendez MS OTR/L   [place] Licensure# [id number]

## 2024-10-02 NOTE — ASSESSMENT & PLAN NOTE
Hyperkalemia chronic hyponatremia  Most likely hypervolemic hyponatremia  Most likely reason for hyponatremia is acute exacerbation of heart failure with preserved EF fraction    Plan:  Diuretic  Sodium check every 6 hours  Correction rate is 6/24-hour

## 2024-10-02 NOTE — H&P
H&P - Hospitalist   Name: Farnaz Martin 79 y.o. female I MRN: [id number]  Unit/Bed#: S -01 I Date of Admission: 10/1/2024   Date of Service: 10/2/2024 I Hospital Day: 1     Assessment & Plan  Acute hypoxic respiratory failure (HCC)  Wt Readings from Last 3 Encounters:   10/02/24 104 kg (228 lb 6.3 oz)   09/10/24 68.5 kg (151 lb)   08/23/24 70.3 kg (155 lb)   Most likely CHF exacerbation  The most recent CT scan did not show any consolidation or pneumonia  BNP: More than 1k  Exudative pleural effusion because of Lysteda positive  Patient had early signs of cirrhosis  Dry weight: 150-160 LB  Patient has chronic indwelling pleural catheter and the most recent CT chest shows decreasing volume of right-sided pleural effusion but increased volume of left-sided pleural effusion, perisplenic and perihepatic ascites  The most common reason for acute hypoxic respiratory failure is not taking Bumex as prescribed  As per the most recent cardiology note, patient was asked to take Bumex 3 mg daily but patient takes Bumex 2 mg daily  Patient is compliant with the medication, cardiac diet  The most recent echo shows LVEF: 50%, pulmonary hypertension findings  Patient has AV paced pacemaker for tachybradycardia syndrome (the most recent interrogation was unremarkable)  Patient has atrial fibrillation    Plan:  Since furosemide 40 mg IV once was given in ED, patient will be on 3.25 mg IV Bumex twice daily as per conversation of 2.5 mg fold increment for acute exacerbation of CHF  Monitor I/O, daily weight  Goal of I/O's -700 to 1 L net in 24-hour  Cardiac diet, fluid restriction, salt restriction  Elevate the head  AFP can be checked  Ultrasound/CT abdomen with contrast/MR abdomen with contrast can be considered to rule out hepatocellular carcinoma as a screening procedure for cirrhosis    Hyponatremia  Hyperkalemia chronic hyponatremia  Most likely hypervolemic hyponatremia  Most likely reason for hyponatremia is acute  exacerbation of heart failure with preserved EF fraction    Plan:  Diuretic  Sodium check every 6 hours  Correction rate is 6/24-hour  Atrial fibrillation (HCC)  The most common reason for atrial fibrillation in this patient is pulmonary hypertension  The most recent TSH was unremarkable    Plan:  Continue home medication: Flecainide 150 mg twice daily, Toprol 75 mg twice daily, diltiazem 120 mg daily  Eliquis 2.5 mg twice daily  KIMBERLEY (acute kidney injury) (HCC)  Most likely reason for KIMBERLEY is acute exacerbation of heart failure with preserved region fraction  Most likely prerenal in the background of BUN/creatinine more than 20    Plan:  Diuretic  Monitor creatinine  Hyperkalemia  Asymptomatic  hyperkalemia  tHe most likely reason for hyperkalemia is KIMBERLEY  Not on any medication which can explain hyperkalemia  The most recent potassium was 5.7    Plan:  Telemetry, in the background of high risk of arrhythmia in the background of heart failure with preserved region fraction, A-fib, pacemaker, pulmonary hypertension  Potassium check every 4 hours until it becomes normalized 2 times  Calcium gluconate  Insulin and dextrose  Albuterol inhaler    Diabetes mellitus type 2, insulin dependent ([facility])  Lab Results   Component Value Date    HGBA1C 8.8 (H) 07/13/2024       Recent Labs     10/01/24  2143 10/02/24  0022   POCGLU 108 214*       Blood Sugar Average: Last 72 hrs:  (P) 161    Home dose: Dapagliflozin 5 mg daily, insulin 10 unit glargine bedtime    Plan:  Hold dapagliflozin  SSI  Insulin glargine 5 unit, because of unsure about unsure about oral food intake during hospitalization  Raynaud phenomenon  Nifedipine can be considered with holding parameter      VTE Pharmacologic Prophylaxis: VTE Score: 5 High Risk (Score >/= 5) - Pharmacological DVT Prophylaxis Ordered: apixaban (Eliquis). Sequential Compression Devices Ordered.  Code Status: Level 3 - DNAR and DNI .  Informed   Decision.  Discussion with family:  Primary team  will attempt to inform family member, as per the patient's request..     Anticipated Length of Stay: Patient will be admitted on an inpatient basis with an anticipated length of stay of greater than 2 midnights secondary to CHF exacerbation..    History of Present Illness   Chief Complaint: Worsening shortness of breath    Farnaz Martin is a 79 y.o. 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    As per the most recent cardiology note patient was supposed to take Bumex 3 mg daily but patient was taking 2 mg daily.  Patient is compliant with the medication, cardiac diet.    In the ED, patient was put on mid flow than high flow and then BiPAP but the VBG did not show any respiratory acidosis.  Then patient was successfully able to wean off from BiPAP to the mid flow (10 L).    As per the patient, she has unintentional weight gain of 20-30 LB's in the past 2 months.  She does not have any PND/orthopnea/chest pain/palpitation/skipping beats/recent viral infection.  Please see ROS.    Review of Systems   Constitutional:  Positive for activity change, fatigue and unexpected weight change. Negative for appetite change, chills, diaphoresis and fever.   Respiratory:  Positive for cough and shortness of breath. Negative for apnea, choking, chest tightness, wheezing and stridor.    Cardiovascular:  Negative for chest pain, palpitations and leg swelling.   Gastrointestinal:  Negative for abdominal distention, abdominal pain, anal bleeding, blood in stool, constipation, diarrhea, nausea and rectal pain.   Genitourinary: Negative.    Neurological: Negative.    Psychiatric/Behavioral: Negative.          Historical Information   Past Medical History:   Diagnosis Date    Actinic keratosis     last assessed - 06Jun2014    Arthritis     Atrial fibrillation with rapid ventricular response (HCC)     last assessed - 26Apr2016    Atrial fibrillation with rapid ventricular response (HCC) 04/19/2016    Basal cell carcinoma     Benign colon polyp     last assessed - 27Apr2015    Bronchiectasis without complication (HCC)     CHF (congestive heart failure) (HCC) 06/2022    Chronic diastolic CHF (congestive heart failure) (HCC)     Disease of thyroid gland     Effusion of knee joint right     Resolved - 19Apr2016    Esophageal reflux     Fibromyalgia     last assessed - [id number]    Fibromyalgia, primary     GERD (gastroesophageal reflux disease)     Hyperlipidemia     Hypertension     Hyponatremia     Malignant neoplasm of thyroid gland (HCC)     Meningioma (HCC)     MGUS (monoclonal gammopathy of unknown significance)     Palpitations     last assessed - 30Apr2013    Peroneal tendonitis, right     last assessed - 02Oct2013    Right lumbar radiculopathy 03/17/2016    Thyroid cancer (HCC)     Thyroid nodule     Trochanteric bursitis of left hip 03/09/2018     Past Surgical History:   Procedure Laterality Date    CARDIAC ELECTROPHYSIOLOGY STUDY AND ABLATION  08/2022    CATARACT EXTRACTION Bilateral     COLONOSCOPY  03/2018    COLONOSCOPY W/ POLYPECTOMY  2021    repeat in 5 years    EYE SURGERY      IR PLEURAL EFFUSION LONG-TERM CATHETER PLACEMENT  8/19/2024    IR THORACENTESIS  7/27/2024    IR THORACENTESIS  8/13/2024    OOPHORECTOMY      AZ NEUROPLASTY &/TRANSPOS MEDIAN NRV CARPAL TUNNE Right 11/14/2019    Procedure: RELEASE CARPAL TUNNEL;  Surgeon: Onesimo Tate MD;  Location: [facility];  Service: Orthopedics    AZ TOTAL THYROID LOBECTOMY UNI W/WO ISTHMUSECTOMY Left 12/16/2020    Procedure: Left THYROID LOBECTOMY;  Surgeon: Jhony Bhatt MD;  Location: [facility];  Service: ENT    RECTAL POLYPECTOMY      REPLACEMENT TOTAL  KNEE Left     last assessed - [id number]    TONSILLECTOMY      TOTAL ABDOMINAL HYSTERECTOMY      TUBAL LIGATION      US GUIDED THYROID BIOPSY  10/14/2020     Social History     Tobacco Use    Smoking status: Never    Smokeless tobacco: Never   Vaping Use    Vaping status: Never Used   Substance and Sexual Activity    Alcohol use: Not Currently    Drug use: Never    Sexual activity: Not Currently     E-Cigarette/Vaping    E-Cigarette Use Never User      E-Cigarette/Vaping Substances    Nicotine No     THC No     CBD No     Flavoring No     Other No     Unknown No      Family History   Problem Relation Age of Onset    Heart disease Mother     Diabetes Mother     Heart disease Father     Coronary artery disease Father     Stroke Father         cerebrovascular accident    Heart attack Father         myocardial infarction    Sudden death Father         scd    Other Family         Back disorder    Coronary artery disease Family     Neuropathy Family     Osteoporosis Family     No Known Problems Daughter     No Known Problems Maternal Grandmother     No Known Problems Maternal Grandfather     No Known Problems Paternal Grandmother     No Known Problems Paternal Grandfather     Cancer Maternal Uncle     Breast cancer Maternal Aunt 65    No Known Problems Son     No Known Problems Maternal Aunt     No Known Problems Maternal Aunt     No Known Problems Maternal Aunt     No Known Problems Paternal Aunt     No Known Problems Paternal Aunt     Anuerysm Neg Hx     Clotting disorder Neg Hx     Arrhythmia Neg Hx     Heart failure Neg Hx      Social History:  Marital Status:    Occupation: Retired  Patient Pre-hospital Living situation: [facility]  Patient Pre-hospital Level of Mobility: walks  Patient Pre-hospital Diet Restrictions: Cardiac diet   Objective :  Temp:  [96.5 °F (35.8 °C)-98.1 °F (36.7 °C)] 98.1 °F (36.7 °C)  HR:  [48-69] 61  BP: (100-136)/(57-82) 106/61  Resp:  [18-24] 20  SpO2:  [77  %-100 %] 94 %  O2 Device: Mid flow nasal cannula    Physical Exam  Constitutional:       General: She is not in acute distress.     Appearance: She is obese. She is not ill-appearing, toxic-appearing or diaphoretic.   Cardiovascular:      Rate and Rhythm: Regular rhythm. Bradycardia present.      Pulses: Normal pulses.      Heart sounds: Normal heart sounds. No murmur heard.     No friction rub. No gallop.   Pulmonary:      Effort: Respiratory distress present.      Breath sounds: No stridor. No wheezing, rhonchi or rales.      Comments: Decreased breath sounds bilaterally more on the left side  10 L mid flow  Right-sided pleural fluid catheter  Chest:      Chest wall: No tenderness.   Abdominal:      General: There is no distension.      Palpations: There is no mass.      Tenderness: There is no abdominal tenderness.      Hernia: No hernia is present.   Musculoskeletal:      Right lower leg: No edema.      Left lower leg: No edema.   Skin:     Comments: Raynaud's phenomena   Neurological:      General: No focal deficit present.      Mental Status: She is alert and oriented to person, place, and time. Mental status is at baseline.   Psychiatric:         Mood and Affect: Mood normal.         Behavior: Behavior normal.         Thought Content: Thought content normal.         Judgment: Judgment normal.         Lines/Drains:            Lab Results: I have reviewed the following results:  Results from last 7 days   Lab Units 10/02/24  0505   WBC Thousand/uL 7.53   HEMOGLOBIN g/dL 10.4*   HEMATOCRIT % 33.1*   PLATELETS Thousands/uL 267   SEGS PCT % 68   LYMPHO PCT % 16   MONO PCT % 12   EOS PCT % 3     Results from last 7 days   Lab Units 10/02/24  0505   SODIUM mmol/L 129*   POTASSIUM mmol/L 4.6  4.5   CHLORIDE mmol/L 98   CO2 mmol/L 23   BUN mg/dL 50*   CREATININE mg/dL 1.89*   ANION GAP mmol/L 8   CALCIUM mg/dL 8.8   ALBUMIN g/dL 3.2*   TOTAL BILIRUBIN mg/dL 0.85   ALK PHOS U/L 82   ALT U/L 53*   AST U/L 62*   GLUCOSE  RANDOM mg/dL 177*     Results from last 7 days   Lab Units 10/02/24  0505   INR  1.75*     Results from last 7 days   Lab Units 10/02/24  0022 10/01/24  2143   POC GLUCOSE mg/dl 214* 108     Lab Results   Component Value Date    HGBA1C 8.8 (H) 07/13/2024    HGBA1C 8.8 (H) 06/28/2024    HGBA1C 8.9 (H) 05/07/2024     Results from last 7 days   Lab Units 10/01/24  1813 10/01/24  1650   LACTIC ACID mmol/L 1.7 2.2*   PROCALCITONIN ng/ml  --  0.14       Imaging Results Review: I personally reviewed the following image studies/reports in PACS and discussed pertinent findings with Radiology: chest xray and CT chest. My interpretation of the radiology images/reports is: Improving right-sided pleural effusion, worsening left-sided pleural effusion, perisplenic and perihepatic ascites.  Other Study Results Review: EKG was reviewed.     Administrative Statements   I have spent a total time of 40 minutes in caring for this patient on the day of the visit/encounter including Diagnostic results, Prognosis, Risks and benefits of tx options, Instructions for management, Patient and family education, Importance of tx compliance, Risk factor reductions, Impressions, Counseling / Coordination of care, Documenting in the medical record, Reviewing / ordering tests, medicine, procedures  , Obtaining or reviewing history  , and Communicating with other healthcare professionals .    ** Please Note: This note has been constructed using a voice recognition system. **

## 2024-10-02 NOTE — RESPIRATORY THERAPY NOTE
10/02/24 1952   Respiratory Assessment   Resp Comments Pt found with % on midflow nasal cannula set at 4L. Pt placed on low flow nasal cannula and untimately weaned to room air   Oxygen Therapy/Pulse Ox   O2 Device None (Room air)   SpO2 Activity At Rest   $ Pulse Oximetry Spot Check Charge Completed

## 2024-10-02 NOTE — ASSESSMENT & PLAN NOTE
Reports chronic knee pain worsened after fall 2 weeks ago.  She notes pain exacerbated when walking.  XR right knee ordered.  Analgesia as needed  PT/OT  Orthopedics as needed.

## 2024-10-02 NOTE — ASSESSMENT & PLAN NOTE
Lab Results   Component Value Date    HGBA1C 8.8 (H) 07/13/2024       Recent Labs     10/02/24  0022 10/02/24  0801 10/02/24  1157 10/02/24  1609   POCGLU 214* 152* 172* 139       Blood Sugar Average: Last 72 hrs:  (P) 157  Home dose: Dapagliflozin 5 mg daily, insulin 10 unit glargine bedtime    Plan:  Hold dapagliflozin  SSI  Insulin glargine 5 unit, because of unsure about unsure about oral food intake during hospitalization

## 2024-10-02 NOTE — ASSESSMENT & PLAN NOTE
Hyperkalemia chronic hyponatremia  Most likely hypervolemic hyponatremia  Most likely reason for hyponatremia is acute exacerbation of heart failure with preserved EF fraction  10/2 corrected Na 130     Plan:  Diuretic, continue to monitor on AM labs   Correction rate is 6/24-hour

## 2024-10-02 NOTE — PROGRESS NOTES
Progress Note - Hospitalist   Name: Farnaz Martin 79 y.o. female I MRN: [id number]  Unit/Bed#: S -01 I Date of Admission: 10/1/2024   Date of Service: 10/2/2024 I Hospital Day: 1    Assessment & Plan  Acute hypoxic respiratory failure (HCC)  Wt Readings from Last 3 Encounters:   10/02/24 104 kg (228 lb 6.3 oz)   09/10/24 68.5 kg (151 lb)   08/23/24 70.3 kg (155 lb)   BNP: 1039  I&O: today -985 so far, since admission -1715  Dry weight: 150-160 LB, updated weight 162 standing scale 10/2  Patient has chronic indwelling pleural catheter   CT chest 10/1 shows decreasing volume of right-sided pleural effusion but increased volume of left-sided pleural effusion, perisplenic and perihepatic ascites  Per most recent cardiology note patient taking Bumex 2 mg daily  Patient is compliant with the medication, cardiac diet  Echo 7/14/2024 shows LVEF: 50%, pulmonary hypertension findings  Patient has AV paced pacemaker for tachybradycardia syndrome (the most recent interrogation was unremarkable)  Patient has atrial fibrillation  furosemide 40 mg IV once was given in ED  Calculated MELD score 21, 19.6% 3 month mortality     Plan:  Patient will be on 3.25 mg IV Bumex twice daily as per conversation of 2.5 mg fold increment for acute exacerbation of CHF  Will monitor CR as in past admission patient's CR increased with diuresis   Monitor I/O, daily weight  Goal of I/O's -700 to 1 L net in 24-hour  Cardiac diet, fluid restriction, salt restriction  Continue to decrease oxygen demand, currently on 6L mid flow NC  Elevate the head  If diuresis inadequate with current Bumex, consider IR consult   Hyponatremia  Hyperkalemia chronic hyponatremia  Most likely hypervolemic hyponatremia  Most likely reason for hyponatremia is acute exacerbation of heart failure with preserved EF fraction  10/2 corrected Na 130     Plan:  Diuretic, continue to monitor on AM labs   Correction rate is 6/24-hour  Atrial fibrillation (HCC)  The most  common reason for atrial fibrillation in this patient is pulmonary hypertension  The most recent TSH was unremarkable    Plan:  Continue home medication: Flecainide 150 mg twice daily, Toprol 75 mg twice daily, diltiazem 120 mg daily  Eliquis 2.5 mg twice daily  KIMBERLEY (acute kidney injury) (HCC)  Most likely reason for KIMBERLEY is acute exacerbation of heart failure with preserved region fraction  Most likely prerenal in the background of BUN/creatinine more than 20    Plan:  Diuretic  Monitor creatinine  Hyperkalemia (Resolved: 10/2/2024)  Asymptomatic hyperkalemia  most likely reason for hyperkalemia is KIMBERLEY  Not on any medication which can explain hyperkalemia  5.7 on admission, currently normalized to 4.7 and 4.5    Plan:  Telemetry, in the background of high risk of arrhythmia in the background of heart failure with preserved region fraction, A-fib, pacemaker, pulmonary hypertension, will continue telemetry overnight   Potassium normalized   Calcium gluconate, Insulin and dextrose, Albuterol inhaler completed on admission  Diabetes mellitus type 2, insulin dependent ([facility])  Lab Results   Component Value Date    HGBA1C 8.8 (H) 07/13/2024       Recent Labs     10/02/24  0022 10/02/24  0801 10/02/24  1157 10/02/24  1609   POCGLU 214* 152* 172* 139       Blood Sugar Average: Last 72 hrs:  (P) 157  Home dose: Dapagliflozin 5 mg daily, insulin 10 unit glargine bedtime    Plan:  Hold dapagliflozin  SSI  Insulin glargine 5 unit, because of unsure about unsure about oral food intake during hospitalization  Raynaud phenomenon  Nifedipine can be considered with holding parameter  Closed fracture of multiple ribs of left side with routine healing  Status post fall about 2 weeks ago per chart review at nursing home  At time of fall patient had unremarkable chest x-ray  Patient currently not having any rib pain  Trauma consulted appreciate recommendations, x-ray left wrist, right knee, left hand completed with no acute  findings    VTE Pharmacologic Prophylaxis: VTE Score: 5 High Risk (Score >/= 5) - Pharmacological DVT Prophylaxis Ordered: apixaban (Eliquis). Sequential Compression Devices Ordered.    Mobility:   Basic Mobility Inpatient Raw Score: 18  JH-HLM Goal: 6: Walk 10 steps or more  JH-HLM Achieved: 4: Move to chair/commode  JH-HLM Goal achieved. Continue to encourage appropriate mobility.    Patient Centered Rounds: I performed bedside rounds with nursing staff today.   Discussions with Specialists or Other Care Team Provider: Trauma    Education and Discussions with Family / Patient: Updated  (daughter) via phone.    Current Length of Stay: 1 day(s)  Current Patient Status: Inpatient   Certification Statement: The patient will continue to require additional inpatient hospital stay due to acute hypoxic respiratory failure currently IV diuretics  Discharge Plan: Anticipate discharge in 24-48 hrs to prior assisted or independent living facility.    Code Status: Level 3 - DNAR and DNI    Subjective   Patient was seen this morning upright eating her breakfast currently on 8 L mid flow oxygen satting at 98%.  Patient states she is doing all right and slept fine last night.  She reports her breathing has improved since admission to the hospital.  She feels her breathing is worse on exertion however has not been out of bed yet this morning.  Patient has right tunneled catheter that was drained yesterday, patient denies any drainage or pain at catheter site.  Patient is urinating without any difficulty and had last bowel yesterday.  Patient currently denies any shortness of breath, chest pain, fever, chills, abdominal pain.    Objective :  Temp:  [96.5 °F (35.8 °C)-98.1 °F (36.7 °C)] 96.7 °F (35.9 °C)  HR:  [48-78] 77  BP: (104-136)/(57-82) 119/70  Resp:  [13-24] 13  SpO2:  [86 %-100 %] 99 %  O2 Device: Mid flow nasal cannula    Body mass index is 41.77 kg/m².     Input and Output Summary (last 24 hours):      Intake/Output Summary (Last 24 hours) at 10/2/2024 1650  Last data filed at 10/2/2024 1449  Gross per 24 hour   Intake 1445 ml   Output 1950 ml   Net -505 ml       Physical Exam  Vitals reviewed.   Constitutional:       General: She is not in acute distress.     Appearance: She is obese. She is not ill-appearing, toxic-appearing or diaphoretic.   HENT:      Mouth/Throat:      Mouth: Mucous membranes are moist.   Eyes:      Extraocular Movements: Extraocular movements intact.      Pupils: Pupils are equal, round, and reactive to light.   Cardiovascular:      Rate and Rhythm: Normal rate and regular rhythm.      Pulses: Normal pulses.      Heart sounds: Normal heart sounds. No murmur heard.     No friction rub. No gallop.   Pulmonary:      Effort: No respiratory distress.      Breath sounds: No stridor. No wheezing, rhonchi or rales.      Comments: Decreased breath sounds bilaterally more on the left side  10 L mid flow  Right-sided pleural fluid catheter  Chest:      Chest wall: No tenderness.   Abdominal:      General: There is distension.      Palpations: There is no mass.      Tenderness: There is no abdominal tenderness.      Hernia: No hernia is present.   Musculoskeletal:         General: No tenderness (Anterior chest).      Right lower leg: No edema.      Left lower leg: No edema.   Skin:     General: Skin is warm and dry.      Comments: Raynaud's phenomena   Neurological:      General: No focal deficit present.      Mental Status: She is alert and oriented to person, place, and time.   Psychiatric:         Mood and Affect: Mood normal.         Lines/Drains: Right sided pleural fluid catheter       Telemetry:  Telemetry Orders (From admission, onward)               24 Hour Telemetry Monitoring  Continuous x 24 Hours (Telem)        Question:  Reason for 24 Hour Telemetry  Answer:  Acute respiratory failure on BiPAP                     Telemetry Reviewed:  Episode of bradycardia (40, 42) last night at 9:30  pm and tachycardia last night at 11:30 pm, paced this a.m.  Indication for Continued Telemetry Use: Acute CHF on >200 mg lasix/day or equivalent dose or with new reduced EF.            Lab Results: I have reviewed the following results:   Results from last 7 days   Lab Units 10/02/24  0505   WBC Thousand/uL 7.53   HEMOGLOBIN g/dL 10.4*   HEMATOCRIT % 33.1*   PLATELETS Thousands/uL 267   SEGS PCT % 68   LYMPHO PCT % 16   MONO PCT % 12   EOS PCT % 3     Results from last 7 days   Lab Units 10/02/24  0505   SODIUM mmol/L 129*   POTASSIUM mmol/L 4.6  4.5   CHLORIDE mmol/L 98   CO2 mmol/L 23   BUN mg/dL 50*   CREATININE mg/dL 1.89*   ANION GAP mmol/L 8   CALCIUM mg/dL 8.8   ALBUMIN g/dL 3.2*   TOTAL BILIRUBIN mg/dL 0.85   ALK PHOS U/L 82   ALT U/L 53*   AST U/L 62*   GLUCOSE RANDOM mg/dL 177*     Results from last 7 days   Lab Units 10/02/24  0505   INR  1.75*     Results from last 7 days   Lab Units 10/02/24  1609 10/02/24  1157 10/02/24  0801 10/02/24  0022 10/01/24  2143   POC GLUCOSE mg/dl 139 172* 152* 214* 108         Results from last 7 days   Lab Units 10/01/24  1813 10/01/24  1650   LACTIC ACID mmol/L 1.7 2.2*   PROCALCITONIN ng/ml  --  0.14       Recent Cultures (last 7 days):   Results from last 7 days   Lab Units 10/01/24  1650   BLOOD CULTURE  Received in Microbiology Lab. Culture in Progress.  Received in Microbiology Lab. Culture in Progress.       Imaging Results Review: I reviewed radiology reports from this admission including: chest xray and CT chest.  Other Study Results Review: EKG was reviewed.     Last 24 Hours Medication List:     Current Facility-Administered Medications:     acetaminophen (TYLENOL) tablet 650 mg, Q8H PRN    albuterol (PROVENTIL HFA,VENTOLIN HFA) inhaler 2 puff, Q6H PRN    aluminum-magnesium hydroxide-simethicone (MAALOX) oral suspension 30 mL, Q6H PRN    apixaban (ELIQUIS) tablet 2.5 mg, BID    ascorbic acid (VITAMIN C) tablet 500 mg, HS    benzonatate (TESSALON PERLES)  capsule 100 mg, TID PRN    bumetanide (BUMEX) injection 3.25 mg, BID (diuretic)    Cholecalciferol (VITAMIN D3) tablet 2,000 Units, BID    diltiazem (CARDIZEM CD) 24 hr capsule 120 mg, Daily    ezetimibe (ZETIA) tablet 10 mg, HS    famotidine (PEPCID) tablet 20 mg, Daily    flecainide (TAMBOCOR) tablet 150 mg, BID    gabapentin (NEURONTIN) capsule 300 mg, HS    guaiFENesin (ROBITUSSIN) oral liquid 100 mg, TID PRN    insulin glargine (LANTUS) subcutaneous injection 5 Units 0.05 mL, HS    insulin lispro (HumALOG/ADMELOG) 100 units/mL subcutaneous injection 1-6 Units, TID AC **AND** Fingerstick Glucose (POCT), TID AC    insulin lispro (HumALOG/ADMELOG) 100 units/mL subcutaneous injection 1-6 Units, HS    levalbuterol (XOPENEX) inhalation solution 1.25 mg, TID    loratadine (CLARITIN) tablet 5 mg, Daily    melatonin tablet 3 mg, HS    metoprolol succinate (TOPROL-XL) 24 hr tablet 75 mg, Q12H JOAQUINA    polyethylene glycol (MIRALAX) packet 17 g, Daily PRN    rOPINIRole (REQUIP) tablet 2 mg, HS    sevelamer (RENAGEL) tablet 800 mg, TID With Meals    sodium chloride (OCEAN) 0.65 % nasal spray 1 spray, Q1H PRN    trimethobenzamide (TIGAN) IM injection 200 mg, Q6H PRN    umeclidinium 62.5 mcg/actuation inhaler AEPB 1 puff, Daily    zolpidem (AMBIEN) tablet 10 mg, HS    Administrative Statements   Today, Patient Was Seen By: Brooke Mendelson, DO  I have spent a total time of 30 minutes in caring for this patient on the day of the visit/encounter including Diagnostic results, Prognosis, Risks and benefits of tx options, Instructions for management, Patient and family education, Importance of tx compliance, Risk factor reductions, Counseling / Coordination of care, Documenting in the medical record, Reviewing / ordering tests, medicine, procedures  , Obtaining or reviewing history  , and Communicating with other healthcare professionals . Topics discussed with the patient / family include symptom assessment and management,  medication review, medication adjustment, psychosocial support, and supportive listening.    **Please Note: This note may have been constructed using a voice recognition system.**

## 2024-10-02 NOTE — ASSESSMENT & PLAN NOTE
The most common reason for atrial fibrillation in this patient is pulmonary hypertension  The most recent TSH was unremarkable    Plan:  Continue home medication: Flecainide 150 mg twice daily, Toprol 75 mg twice daily, diltiazem 120 mg daily  Eliquis 2.5 mg twice daily

## 2024-10-02 NOTE — ASSESSMENT & PLAN NOTE
Most likely reason for KIMBERLEY is acute exacerbation of heart failure with preserved region fraction  Most likely prerenal in the background of BUN/creatinine more than 20    Plan:  Diuretic  Monitor creatinine

## 2024-10-02 NOTE — ASSESSMENT & PLAN NOTE
Status post fall about 2 weeks ago per chart review at nursing home  At time of fall patient had unremarkable chest x-ray  Patient currently not having any rib pain  Trauma consulted appreciate recommendations, x-ray left wrist, right knee, left hand completed with no acute findings

## 2024-10-03 PROBLEM — N17.9 AKI (ACUTE KIDNEY INJURY) (HCC): Status: RESOLVED | Noted: 2024-01-01 | Resolved: 2024-01-01

## 2024-10-03 NOTE — PHYSICAL THERAPY NOTE
PHYSICAL THERAPY EVALUATION NOTE          Patient Name: Farnaz Martin  Today's Date: 10/3/2024          AGE:   79 y.o.  Mrn:   [id number]  ADMIT DX:  Hyperkalemia [E87.5]  Lactic acidosis [E87.20]  CHF (congestive heart failure) (HCC) [I50.9]  Hyponatremia [E87.1]  Respiratory distress [R06.03]  Pleural effusion [J90]  KIMBERLEY (acute kidney injury) (HCC) [N17.9]  Acute respiratory failure with hypoxia (HCC) [J96.01]    Past Medical History:  Past Medical History:   Diagnosis Date    Actinic keratosis     last assessed - 06Jun2014    Arthritis     Atrial fibrillation with rapid ventricular response (HCC)     last assessed - 26Apr2016    Atrial fibrillation with rapid ventricular response (HCC) 04/19/2016    Basal cell carcinoma     Benign colon polyp     last assessed - 27Apr2015    Bronchiectasis without complication (HCC)     CHF (congestive heart failure) (HCC) 06/2022    Chronic diastolic CHF (congestive heart failure) (HCC)     Disease of thyroid gland     Effusion of knee joint right     Resolved - 19Apr2016    Esophageal reflux     Fibromyalgia     last assessed - [id number]    Fibromyalgia, primary     GERD (gastroesophageal reflux disease)     Hyperlipidemia     Hypertension     Hyponatremia     Malignant neoplasm of thyroid gland (HCC)     Meningioma (HCC)     MGUS (monoclonal gammopathy of unknown significance)     Palpitations     last assessed - 30Apr2013    Peroneal tendonitis, right     last assessed - [id number]    Right lumbar radiculopathy 03/17/2016    Thyroid cancer (HCC)     Thyroid nodule     Trochanteric bursitis of left hip 03/09/2018       Past Surgical History:  Past Surgical History:   Procedure Laterality Date    CARDIAC ELECTROPHYSIOLOGY STUDY AND ABLATION  08/2022    CATARACT EXTRACTION Bilateral     COLONOSCOPY  03/2018    COLONOSCOPY W/ POLYPECTOMY  2021    repeat in 5 years    EYE SURGERY      IR PLEURAL EFFUSION LONG-TERM CATHETER  PLACEMENT  2024    IR THORACENTESIS  2024    IR THORACENTESIS  2024    OOPHORECTOMY      LA NEUROPLASTY &/TRANSPOS MEDIAN NRV CARPAL TUNNE Right 2019    Procedure: RELEASE CARPAL TUNNEL;  Surgeon: Onesimo Tate MD;  Location: [facility];  Service: Orthopedics    LA TOTAL THYROID LOBECTOMY UNI W/WO ISTHMUSECTOMY Left 2020    Procedure: Left THYROID LOBECTOMY;  Surgeon: Jhony Bhatt MD;  Location: [facility];  Service: ENT    RECTAL POLYPECTOMY      REPLACEMENT TOTAL KNEE Left     last assessed - 2015    TONSILLECTOMY      TOTAL ABDOMINAL HYSTERECTOMY      TUBAL LIGATION      US GUIDED THYROID BIOPSY  10/14/2020     Length Of Stay: 2        PHYSICAL THERAPY EVALUATION:    Patient's identity confirmed via 2 patient identifiers (full name and ) at start of session       10/03/24 1015   PT Last Visit   PT Visit Date 10/03/24   Note Type   Note type Evaluation   Pain Assessment   Pain Assessment Tool 0-10   Pain Score No Pain   Restrictions/Precautions   Weight Bearing Precautions Per Order No   Other Precautions Cognitive;Chair Alarm;Bed Alarm;O2;Fall Risk;Hard of hearing  (3L O2 via NC)   Home Living   Type of Home House   Home Layout One level;Performs ADLs on one level;Able to live on main level with bedroom/bathroom;Ramped entrance  (2 CHIN vs ramp)   Bathroom Shower/Tub Walk-in shower   Bathroom Toilet Raised   Bathroom Equipment Grab bars around toilet;Grab bars in shower;Shower chair   Additional Comments Pt currently admitted from [facility], at baseline pt lives in home set-up above per chart review   Prior Function   Level of [place] Independent with functional mobility;Independent with ADLs   Lives With (S)  Alone   Receives Help From Family  (daughter lives next door)   Falls in the last 6 months 1 to 4   Comments At baseline pt amb ind ind w/ RW prn, reports she has been ambulating w/ the RW at rehab   General   Additional Pertinent History Pt w/ anterolateral L ribs 5-8 fxs.  SpO2 briefly decreased to 85% while sitting on toilet (post ambulation), recovered to >88% w/in 20-30 sec, pt declined feeling SOB throughout   Family/Caregiver Present No   Cognition   Overall Cognitive Status Impaired   Arousal/Participation Cooperative   Attention Attends with cues to redirect   Orientation Level Oriented to person;Oriented to place   Memory Decreased recall of recent events   Following Commands Follows one step commands with increased time or repetition   Comments Pt ID via name and ; pt agreeable to PT eval and mobility   RLE Assessment   RLE Assessment   (grossly assessed w/ functional mobility, at least 3+/5)   LLE Assessment   LLE Assessment   (grossly assessed w/ functional mobility, at least 3+/5)   Vision-Basic Assessment   Current Vision Wears glasses all the time   Bed Mobility   Supine to Sit 5  Supervision   Additional items Assist x 1;HOB elevated;Bedrails;Increased time required;Verbal cues   Transfers   Sit to Stand 4  Minimal assistance   Additional items Assist x 1;Increased time required;Verbal cues   Stand to Sit 5  Supervision   Additional items Assist x 1;Armrests;Increased time required;Verbal cues   Toilet transfer 4  Minimal assistance   Additional items Assist x 1;Increased time required;Standard toilet  (use of R grab bar)   Ambulation/Elevation   Gait pattern Improper Weight shift;Decreased foot clearance;Short stride;Decreased hip extension;Decreased heel strike   Gait Assistance 4  Minimal assist   Additional items Assist x 1;Verbal cues   Assistive Device Rolling walker   Distance 20'+25'   Balance   Static Sitting Fair +   Dynamic Sitting Fair   Static Standing Fair -  (w/ RW)   Dynamic Standing Poor +   Ambulatory Poor +  (w/ RW)   Activity Tolerance   Activity Tolerance Patient limited by fatigue   Nurse Made Aware COLLEEN Sethi, pre/post   Assessment   Prognosis Good   Problem List Decreased strength;Decreased endurance;Impaired balance;Decreased mobility;Decreased  safety awareness;Decreased cognition   Assessment Farnaz Martin is a 79 y.o. Female who presents to [facility] on 10/1/24 due to respiratory distress. Orders for PT eval and treat received, w/ activity orders of ambulate patient. Comorbidities affecting pt's functional mobility at time of evaluation include: KIMBERLEY, DM, L rib fxs, fibromyalgia, HTN, osteoarthritis. Personal factors affecting DC include: ambulating w/ assistive device, inability to navigate level surfaces w/o external assistance, decreased cognition, and inability to perform IADLs. PTA pt was ambulating w/ a RW. Upon evaluation, pt presents w/ the following deficits: impaired strength, impaired hearing, decreased endurance/activity tolerance, and gait deviations. Pt currently requires supervision for bed mobility, min Ax1 for transfers, min Ax1 w/ RW for ambulation. Pt's clinical presentation is unstable/unpredictable due to abnormal lab values, need for increased assistance w/ functional mobility compared to baseline, requiring supplemental O2 in order to maintain O2 saturation, need to input for safety awareness, ongoing medical management. From a PT/mobility standpoint given the above findings, DC recommendation is level: II (Moderate Rehab Resource Intensity). During current admission, pt will benefit from continued skilled inpatient PT in the acute care setting in order to address the above deficits and to maximize function and mobility prior to DC from acute care.   Barriers to Discharge Decreased caregiver support  (if pt were to DC to home environment where she lives alone)   Goals   Patient Goals to move into the personal care section   STG Expiration Date 10/13/24   Short Term Goal #1 Pt will: perform bed mobility w/ mod I to decrease pt's burden of care and increase pt's independence w/ repositioning in bed; perform transfers w/ mod I to promote OOB mobility; ambulate at least 150' w/ LRAD and mod I to increase pt's ambulatory  endurance/tolerance; increase all balance ratings by at least 1 grade to decrease pt's risk of falls   PT Treatment Day 0   Plan   Treatment/Interventions Functional transfer training;LE strengthening/ROM;Endurance training;Therapeutic exercise;Cognitive reorientation;Patient/family training;Equipment eval/education;Bed mobility;Compensatory technique education;Gait training   PT Frequency 3-5x/wk   Discharge Recommendation   Rehab Resource Intensity Level, PT II (Moderate Resource Intensity)   Equipment Recommended Walker   Walker Package Recommended Wheeled walker   Change/add to Walker Package? No   AM-PAC Basic Mobility Inpatient   Turning in Flat Bed Without Bedrails 3   Lying on Back to Sitting on Edge of Flat Bed Without Bedrails 2   Moving Bed to Chair 3   Standing Up From Chair Using Arms 3   Walk in Room 3   Climb 3-5 Stairs With Railing 2   Basic Mobility Inpatient Raw Score 16   Basic Mobility Standardized Score 38.32   [facility] Level Of Mobility   -[facility] Goal 5: Stand one or more mins   -HL Achieved 7: Walk 25 feet or more   End of Consult   Patient Position at End of Consult Bedside chair;Bed/Chair alarm activated;All needs within reach       The patient's AM-PAC Basic Mobility Inpatient Short Form Raw Score is 16. A Raw score of less than or equal to 16 suggests the patient may benefit from discharge to post-acute rehabilitation services. Please also refer to the recommendation of the Physical Therapist for safe discharge planning.    Pt will benefit from skilled inpatient PT during this admission in order to facilitate progress towards goals and to maximize functional independence prior to DC      DC rec: level II (Moderate Rehab Resource Intensity)        Aspen Lobato, PT, DPT  10/03/24

## 2024-10-03 NOTE — ASSESSMENT & PLAN NOTE
Sodium level baseline 128-134 inpatient  Sodium currently 132 on 09/19/24  Continue adequate hydration  Will monitor BMP

## 2024-10-03 NOTE — ASSESSMENT & PLAN NOTE
Sodium level baseline 128-134 inpatient  Sodium currently stable at 141 on 09/23/2024  Continue adequate hydration  Will monitor BMP

## 2024-10-03 NOTE — ASSESSMENT & PLAN NOTE
Baseline Cr 0.9 to 1.11  Cr currently improved and is now 1.15 on 09/23/2024  Encourage adequate PO hydration  Avoid nephrotoxins and hypotension

## 2024-10-03 NOTE — ASSESSMENT & PLAN NOTE
Lab Results   Component Value Date    HGBA1C 8.8 (H) 07/13/2024     Recent Labs     10/02/24  1609 10/02/24  2106 10/03/24  0721 10/03/24  1120   POCGLU 139 176* 103 176*     Blood Sugar Average: Last 72 hrs:  (P) 155  Home dose: Dapagliflozin 5 mg daily, insulin 10 unit glargine bedtime    Plan:  Hold dapagliflozin  SSI  Insulin glargine 5 unit, because of unsure about unsure about oral food intake during hospitalization

## 2024-10-03 NOTE — ASSESSMENT & PLAN NOTE
Hyperkalemia chronic hyponatremia  Most likely hypervolemic hyponatremia  Most likely reason for hyponatremia is acute exacerbation of heart failure with preserved EF fraction  10/3, 131    Plan:  Diuretic, continue to monitor on AM labs   Correction rate is 6/24-hour

## 2024-10-03 NOTE — ASSESSMENT & PLAN NOTE
No acute fracture on left hand x-ray identified   Mild osteoarthritis present  Continue pain management with prn tylenol  Continue vitamin D and dietary calcium intake  Exercise recommended as tolerated

## 2024-10-03 NOTE — ASSESSMENT & PLAN NOTE
Status post fall on 09/15/2024 with left hand and left rib pain  Imaging of left hand and chest ordered and reviewed, shows no acute fracture of left ribs and left hand.  Continue assistance with ADLs  Maintain fall and safety precautions

## 2024-10-03 NOTE — ASSESSMENT & PLAN NOTE
History of right sided pleural effusion in the setting of bronchiectasis and pulmonary hypertension.  Status post thoracentesis inpatient with pleural drain placement.  Increased sob reported yesterday. Pleural drain emptied for 450 ml, inhaler given and patient placed on non-rebreather mask with reported relief.  Order given to drain catheter again today.   Monitor respiratory status closely  Monitor for signs/symptoms of infection  Repeat blood work ordered for the am

## 2024-10-03 NOTE — PROGRESS NOTES
Progress Note - Hospitalist   Name: Farnaz Martin 79 y.o. female I MRN: [id number]  Unit/Bed#: S -01 I Date of Admission: 10/1/2024   Date of Service: 10/3/2024 I Hospital Day: 2    Assessment & Plan  Acute hypoxic respiratory failure (HCC)  Wt Readings from Last 3 Encounters:   10/03/24 73.8 kg (162 lb 9.6 oz)   09/10/24 68.5 kg (151 lb)   08/23/24 70.3 kg (155 lb)   BNP: 1039  I&O: since admission -2400  Dry weight: 150-160 LB  updated weight 162 standing scale 10/2, 162.6 lb on 10/3  Patient has chronic indwelling pleural catheter   CT chest 10/1 shows decreasing volume of right-sided pleural effusion but increased volume of left-sided pleural effusion, perisplenic and perihepatic ascites, likely from cardiac portal hypertension  Per most recent cardiology note patient taking Bumex 2 mg daily  Patient is compliant with the medication, cardiac diet  Echo 7/14/2024 shows LVEF: 50%, pulmonary hypertension findings  Patient has AV paced pacemaker for tachybradycardia syndrome (the most recent interrogation was unremarkable)  Patient has atrial fibrillation  furosemide 40 mg IV once was given in ED  Calculated MELD score 21, 19.6% 3 month mortality   OT recommend moderate level of care     Plan:  Continue 3.25 mg IV Bumex twice daily   Will monitor CR as in past admission patient's CR increased with diuresis, CR is within normal limits today  Monitor I/O, daily weight  Goal of I/O's -700 to 1 L net in 24-hour  Cardiac diet, fluid restriction, salt restriction  Decreased oxygen demand, patient on room air currently   Elevate the head  X-ray chest today  Right pleural catheter drained today of 525 mL   Hyponatremia  Hyperkalemia chronic hyponatremia  Most likely hypervolemic hyponatremia  Most likely reason for hyponatremia is acute exacerbation of heart failure with preserved EF fraction  10/3, 131    Plan:  Diuretic, continue to monitor on AM labs   Correction rate is 6/24-hour  Atrial fibrillation (HCC)  The  most common reason for atrial fibrillation in this patient is pulmonary hypertension  The most recent TSH was unremarkable    Plan:  Continue home medication: Flecainide 150 mg twice daily, Toprol 75 mg twice daily, diltiazem 120 mg daily  Eliquis 2.5 mg twice daily  KIMBERLEY (acute kidney injury) (HCC) (Resolved: 10/3/2024)  Most likely reason for KIMBERLEY is acute exacerbation of heart failure with preserved region fraction  Most likely prerenal in the background of BUN/creatinine more than 20  Creatinine 1.23 on 10/3  Plan:  Diuretic  Monitor creatinine  Diabetes mellitus type 2, insulin dependent (HCC)  Lab Results   Component Value Date    HGBA1C 8.8 (H) 07/13/2024     Recent Labs     10/02/24  1609 10/02/24  2106 10/03/24  0721 10/03/24  1120   POCGLU 139 176* 103 176*     Blood Sugar Average: Last 72 hrs:  (P) 155  Home dose: Dapagliflozin 5 mg daily, insulin 10 unit glargine bedtime    Plan:  Hold dapagliflozin  SSI  Insulin glargine 5 unit, because of unsure about unsure about oral food intake during hospitalization  Raynaud phenomenon  Nifedipine can be considered with holding parameter  Closed fracture of multiple ribs of left side with routine healing  Status post fall about 2 weeks ago per chart review at nursing home  At time of fall patient had unremarkable chest x-ray  Patient currently not having any rib pain  Trauma consulted appreciate recommendations, x-ray left wrist, right knee, left hand completed with no acute findings    VTE Pharmacologic Prophylaxis: VTE Score: 5 High Risk (Score >/= 5) - Pharmacological DVT Prophylaxis Ordered: apixaban (Eliquis). Sequential Compression Devices Ordered.    Mobility:   Basic Mobility Inpatient Raw Score: 16  JH-HLM Goal: 5: Stand one or more mins  JH-HLM Achieved: 7: Walk 25 feet or more  JH-HLM Goal achieved. Continue to encourage appropriate mobility.    Patient Centered Rounds: I performed bedside rounds with nursing staff today.   Discussions with Specialists or  "Other Care Team Provider: None    Education and Discussions with Family / Patient: Updated  (daughter) via phone.    Current Length of Stay: 2 day(s)  Current Patient Status: Inpatient   Certification Statement: The patient will continue to require additional inpatient hospital stay due to continued diuresis, evaluate oxygen requirement  Discharge Plan: Anticipate discharge in 24-48 hrs to prior assisted or independent living facility.    Code Status: Level 3 - DNAR and DNI    Subjective   Patient was seen this morning sitting up in bed eating breakfast.  Patient reports she is \"not bad\" oxygen overnight, however while eating breakfast this morning her oxygen went down to high 80s therefore nursing placed 1 L O2 nasal cannula.  Patient was low 90s during conversation.  Patient states yesterday when she was doing physical therapy she felt like her shortness of breath worsened with exertion.  Patient is not having any shortness of breath at rest.  Patient denies chest pain, heart palpitations, difficulty urinating.  Patient reports she slept well last night and is not experiencing any musculoskeletal pain at this time.  Patient states she would like to see if she could have her tunnel cath drained today as she gets this done every other day outpatient.    Due to increased artifact this morning telemetry reported episode of V. tach per monitor, EKG was obtained that showed AV dual paced rhythm with no changes to previous EKG.  At this time patient was not having any symptoms.    Objective :  Temp:  [96.7 °F (35.9 °C)-98.6 °F (37 °C)] 98.1 °F (36.7 °C)  HR:  [60-98] 98  BP: (101-125)/(52-76) 124/68  Resp:  [13-18] 17  SpO2:  [86 %-99 %] 96 %  O2 Device: Nasal cannula  Nasal Cannula O2 Flow Rate (L/min):  [1 L/min] 1 L/min    Body mass index is 29.74 kg/m².     Input and Output Summary (last 24 hours):     Intake/Output Summary (Last 24 hours) at 10/3/2024 1536  Last data filed at 10/3/2024 1510  Gross per " 24 hour   Intake 120 ml   Output 2015 ml   Net -1895 ml     Physical Exam  Vitals reviewed.   Constitutional:       General: She is not in acute distress.     Appearance: She is obese. She is not ill-appearing, toxic-appearing or diaphoretic.   HENT:      Mouth/Throat:      Mouth: Mucous membranes are moist.   Eyes:      Extraocular Movements: Extraocular movements intact.      Pupils: Pupils are equal, round, and reactive to light.   Cardiovascular:      Rate and Rhythm: Normal rate and regular rhythm.      Pulses: Normal pulses.      Heart sounds: Normal heart sounds. No murmur heard.     No friction rub. No gallop.   Pulmonary:      Effort: No respiratory distress.      Breath sounds: No stridor. No wheezing, rhonchi or rales.      Comments: Right-sided pleural fluid catheter, no drainage or pain at site   Decreased breath sounds in lower fields bilaterally  Chest:      Chest wall: No tenderness.   Abdominal:      General: Bowel sounds are normal. There is distension (less than previous).      Palpations: Abdomen is soft. There is no mass.      Tenderness: There is no abdominal tenderness.      Hernia: No hernia is present.   Musculoskeletal:         General: No tenderness (Anterior chest).      Right lower leg: No edema.      Left lower leg: No edema.   Skin:     General: Skin is warm and dry.   Neurological:      General: No focal deficit present.      Mental Status: She is alert and oriented to person, place, and time.   Psychiatric:         Mood and Affect: Mood normal.       Lines/Drains: Right sided pleural fluid catheter   Lines/Drains/Airways       Active Status       Name Placement date Placement time Site Days    Pleural Effusion Long-Term Catheter 16 Fr. 08/19/24  0852  --  45                  Telemetry:  Telemetry Orders (From admission, onward)               24 Hour Telemetry Monitoring  Continuous x 24 Hours (Telem)        Question:  Reason for 24 Hour Telemetry  Answer:  Acute respiratory failure  on BiPAP                     Telemetry Reviewed:  Artifact this morning, paced rhythm  Indication for Continued Telemetry Use: Acute CHF on >200 mg lasix/day or equivalent dose or with new reduced EF.                Lab Results: I have reviewed the following results:   Results from last 7 days   Lab Units 10/03/24  0516   WBC Thousand/uL 6.99   HEMOGLOBIN g/dL 10.7*   HEMATOCRIT % 33.8*   PLATELETS Thousands/uL 237   SEGS PCT % 62   LYMPHO PCT % 21   MONO PCT % 12   EOS PCT % 4     Results from last 7 days   Lab Units 10/03/24  0516   SODIUM mmol/L 131*   POTASSIUM mmol/L 4.2   CHLORIDE mmol/L 99   CO2 mmol/L 27   BUN mg/dL 38*   CREATININE mg/dL 1.23   ANION GAP mmol/L 5   CALCIUM mg/dL 8.9   ALBUMIN g/dL 3.3*   TOTAL BILIRUBIN mg/dL 0.91   ALK PHOS U/L 85   ALT U/L 62*   AST U/L 59*   GLUCOSE RANDOM mg/dL 109     Results from last 7 days   Lab Units 10/02/24  0505   INR  1.75*     Results from last 7 days   Lab Units 10/03/24  1120 10/03/24  0721 10/02/24  2106 10/02/24  1609 10/02/24  1157 10/02/24  0801 10/02/24  0022 10/01/24  2143   POC GLUCOSE mg/dl 176* 103 176* 139 172* 152* 214* 108         Results from last 7 days   Lab Units 10/01/24  1813 10/01/24  1650   LACTIC ACID mmol/L 1.7 2.2*   PROCALCITONIN ng/ml  --  0.14       Recent Cultures (last 7 days):   Results from last 7 days   Lab Units 10/01/24  1650   BLOOD CULTURE  No Growth at 24 hrs.  No Growth at 24 hrs.       Imaging Results Review: I reviewed radiology reports from this admission including: xray(s).  Other Study Results Review: No additional pertinent studies reviewed.    Last 24 Hours Medication List:     Current Facility-Administered Medications:     acetaminophen (TYLENOL) tablet 975 mg, Q8H JOAQUINA    albuterol (PROVENTIL HFA,VENTOLIN HFA) inhaler 2 puff, Q6H PRN    aluminum-magnesium hydroxide-simethicone (MAALOX) oral suspension 30 mL, Q6H PRN    apixaban (ELIQUIS) tablet 2.5 mg, BID    ascorbic acid (VITAMIN C) tablet 500 mg, HS     benzonatate (TESSALON PERLES) capsule 100 mg, TID PRN    bumetanide (BUMEX) injection 3.25 mg, BID (diuretic)    Cholecalciferol (VITAMIN D3) tablet 2,000 Units, BID    diltiazem (CARDIZEM CD) 24 hr capsule 120 mg, Daily    ezetimibe (ZETIA) tablet 10 mg, HS    famotidine (PEPCID) tablet 20 mg, Daily    flecainide (TAMBOCOR) tablet 150 mg, BID    gabapentin (NEURONTIN) capsule 300 mg, HS    guaiFENesin (ROBITUSSIN) oral liquid 100 mg, TID PRN    insulin glargine (LANTUS) subcutaneous injection 5 Units 0.05 mL, HS    insulin lispro (HumALOG/ADMELOG) 100 units/mL subcutaneous injection 1-6 Units, TID AC **AND** Fingerstick Glucose (POCT), TID AC    insulin lispro (HumALOG/ADMELOG) 100 units/mL subcutaneous injection 1-6 Units, HS    levalbuterol (XOPENEX) inhalation solution 1.25 mg, TID    loratadine (CLARITIN) tablet 5 mg, Daily    melatonin tablet 3 mg, HS    metoprolol succinate (TOPROL-XL) 24 hr tablet 75 mg, Q12H JOAQUINA    polyethylene glycol (MIRALAX) packet 17 g, Daily PRN    rOPINIRole (REQUIP) tablet 2 mg, HS    sevelamer (RENAGEL) tablet 800 mg, TID With Meals    sodium chloride (OCEAN) 0.65 % nasal spray 1 spray, Q1H PRN    trimethobenzamide (TIGAN) IM injection 200 mg, Q6H PRN    umeclidinium 62.5 mcg/actuation inhaler AEPB 1 puff, Daily    zolpidem (AMBIEN) tablet 10 mg, HS    Administrative Statements   Today, Patient Was Seen By: Brooke Mendelson, DO  I have spent a total time of 30 minutes in caring for this patient on the day of the visit/encounter including Diagnostic results, Prognosis, Risks and benefits of tx options, Instructions for management, Patient and family education, Importance of tx compliance, Risk factor reductions, Counseling / Coordination of care, Documenting in the medical record, Reviewing / ordering tests, medicine, procedures  , Obtaining or reviewing history  , and Communicating with other healthcare professionals . Topics discussed with the patient / family include symptom  assessment and management, medication review, medication adjustment, psychosocial support, and supportive listening.    **Please Note: This note may have been constructed using a voice recognition system.**

## 2024-10-03 NOTE — ASSESSMENT & PLAN NOTE
Baseline Cr 0.9 to 1.11  Cr currently 1.32 on blood work reviewed from 09/19  Encourage adequate PO hydration  Avoid nephrotoxins and hypotension

## 2024-10-03 NOTE — ASSESSMENT & PLAN NOTE
Most likely reason for KIMBERLEY is acute exacerbation of heart failure with preserved region fraction  Most likely prerenal in the background of BUN/creatinine more than 20  Creatinine 1.23 on 10/3  Plan:  Diuretic  Monitor creatinine

## 2024-10-03 NOTE — ASSESSMENT & PLAN NOTE
Patient appears euvolemic in examination  Weight for the past week stable. Weight on 09/19 was 156.3 lbs, today weight is 153.8 lbs.  BNP decreased from 1270 on 08/05/24 to 1118 on 09/23/3034  Continue Bumex and toprol    Will continue to monitor for volume overload.

## 2024-10-03 NOTE — ASSESSMENT & PLAN NOTE
Wt Readings from Last 3 Encounters:   10/03/24 73.8 kg (162 lb 9.6 oz)   09/10/24 68.5 kg (151 lb)   08/23/24 70.3 kg (155 lb)   BNP: 1039  I&O: since admission -2400  Dry weight: 150-160 LB  updated weight 162 standing scale 10/2, 162.6 lb on 10/3  Patient has chronic indwelling pleural catheter   CT chest 10/1 shows decreasing volume of right-sided pleural effusion but increased volume of left-sided pleural effusion, perisplenic and perihepatic ascites, likely from cardiac portal hypertension  Per most recent cardiology note patient taking Bumex 2 mg daily  Patient is compliant with the medication, cardiac diet  Echo 7/14/2024 shows LVEF: 50%, pulmonary hypertension findings  Patient has AV paced pacemaker for tachybradycardia syndrome (the most recent interrogation was unremarkable)  Patient has atrial fibrillation  furosemide 40 mg IV once was given in ED  Calculated MELD score 21, 19.6% 3 month mortality   OT recommend moderate level of care     Plan:  Continue 3.25 mg IV Bumex twice daily   Will monitor CR as in past admission patient's CR increased with diuresis, CR is within normal limits today  Monitor I/O, daily weight  Goal of I/O's -700 to 1 L net in 24-hour  Cardiac diet, fluid restriction, salt restriction  Decreased oxygen demand, patient on room air currently   Elevate the head  X-ray chest today  Right pleural catheter drained today of 525 mL

## 2024-10-03 NOTE — DISCHARGE INSTR - AVS FIRST PAGE
Dear Farnaz Martin,     It was our pleasure to care for you here at [facility].  It is our hope that we were always able to exceed the expected standards for your care during your stay.  You were hospitalized due to Acute hypoxic respiratory failure.  You were cared for on the south 2nd floor by Brooke Mendelson, DO under the service of Addison Purvis MD with the [facility] who covers for your primary care physician (PCP), Naveen Monsivais MD, while you were hospitalized.  If you have any questions or concerns related to this hospitalization, you may contact us at .  For follow up as well as any medication refills, we recommend that you follow up with your primary care physician.  A registered nurse will reach out to you by phone within a few days after your discharge to answer any additional questions that you may have after going home.  However, at this time we provide for you here, the most important instructions / recommendations at discharge:     Notable Medication Adjustments -   Please start taking Bumex 2 mg, one and a half tablets (for a total of 3 mg), by mouth daily.  There were no other medication changes made at this time.  Testing Required after Discharge -   Recommend follow-up BMP and CBC within 1 week of discharge  ** Please contact your PCP to request testing orders for any of the testing recommended here **  Important follow up information -   Recommend follow-up with cardiology as your daily diuretic dose has increased to 3 mg Bumex daily, continue monitoring any weight gain, increasing fluid retention, increasing oxygen demand  Other Instructions -   If you start experiencing severe shortness of breath, increased oxygen demand not relieved by inhalers, fever, chills, increase in fluid retention, chest pain, or urinary retention please call your primary care doctor and return to emergency department for further  evaluation.  Please review this entire after visit summary as additional general instructions including medication list, appointments, activity, diet, any pertinent wound care, and other additional recommendations from your care team that may be provided for you.      Sincerely,     Brooke Mendelson, DO

## 2024-10-03 NOTE — CASE MANAGEMENT
Case Management Assessment & Discharge Planning Note    Patient name Farnaz Martin  Location S /S -01 MRN [id number]  : 1945 Date 10/3/2024       Current Admission Date: 10/1/2024  Current Admission Diagnosis:Acute hypoxic respiratory failure (HCC)   Patient Active Problem List    Diagnosis Date Noted Date Diagnosed    Raynaud phenomenon 10/02/2024     Closed fracture of multiple ribs of left side with routine healing 10/02/2024     Right knee pain 10/02/2024     Left hand pain 10/02/2024     Atrial fibrillation (HCC) 10/01/2024     Diabetes mellitus type 2, insulin dependent ([facility]) 10/01/2024     Rib pain on left side 2024     Gait instability 2024     Sensorineural hearing loss (SNHL) of both ears 2024     Palliative care patient 2024     Shortness of breath 2024     Thyroid nodule 2024     Acute kidney injury superimposed on CKD (chronic kidney disease) stage 3, GFR 30-59 ml/min ([facility]) 2024     Fatigue 2024     Recurrent pleural effusion on right 2024     Syncope and collapse 2024     Fall 2024     Type 2 diabetes mellitus with microalbuminuria, without long-term current use of insulin ([facility]) 2024     Paroxysmal A-fib ([facility]) 2024     Ambulatory dysfunction 2024     Hyponatremia 2024     Bilateral lower extremity edema 2024     Left renal mass 2024     Acute cough 2024     Bronchiectasis without complication ([facility]) 2024     Multiple thyroid nodules 2024     Abnormal CT of the chest 2023     S/P revision of total knee, left 2023    Nonrheumatic mitral valve regurgitation 2023     Acute on chronic heart failure ([facility]) 2022     Elevated serum creatinine 2022     Meningioma ([facility]) 2022     Chronic tension-type headache, not intractable 2022     Vasomotor rhinitis 2021     MGUS (monoclonal gammopathy of unknown  significance) 06/28/2021     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     Tremors of nervous system 04/01/2021     Hx of diplopia 04/01/2021     S/P placement of cardiac pacemaker 03/24/2021     History of sick sinus syndrome s/p PPM 03/2021     Current use of long term anticoagulation 02/04/2021     Malignant neoplasm of thyroid gland (HCC) 01/11/2021     Acute hypoxic respiratory failure (HCC) 01/03/2021     Goals of care, counseling/discussion 12/09/2020     Chronic rhinitis 11/10/2020     Arthritis of both acromioclavicular joints 03/06/2020     Carpal tunnel syndrome on right 11/01/2019     Osteoarthritis of shoulder      TMJ syndrome 10/18/2017     Essential hypertension 04/19/2016     Peripheral neuropathy 03/07/2016     Lumbar spondylosis 06/29/2015     Lumbar stenosis 06/29/2015     Restless legs syndrome 07/09/2014     Allergic rhinitis 04/01/2013     Osteoarthritis of knee 04/01/2013     Insomnia 01/04/2013     Osteopenia 11/26/2012     Fibromyalgia 10/16/2012     Hyperlipidemia 10/16/2012     Generalized osteoarthritis of hand 10/16/2012     Vitamin D deficiency 10/16/2012       LOS (days): 2  Geometric Mean LOS (GMLOS) (days): 3.9  Days to GMLOS:2.1     OBJECTIVE:    Risk of Unplanned Readmission Score: 55.84         Current admission status: Inpatient       Preferred Pharmacy:   [id number]  [place] - [address]  [address] [facility] - [place], [place] [address]  Phone: [phone] Fax: [phone]    [facility] - [facility] [address] [place] [address]  [address] [place] [address]  [address]  [facility] [address]  Phone: [phone] Fax: [phone]    Primary Care Provider: Naveen Monsivais MD    Primary Insurance: MEDICARE  Secondary Insurance: AARP    ASSESSMENT:  Active Health Care Proxies    There are no active Health Care Proxies on file.       Patient Information  Admitted from:: Facility ([facility])  Mental Status: Alert  During Assessment patient was accompanied by: Not accompanied  during assessment  Assessment information provided by:: Patient  Primary Caregiver: Other (Comment)  Caregiver's Name:: Druze [place]  Support Systems: Family members  County of Residence: [place]  What city do you live in?: [place]  Type of Current Residence: Facility    Activities of Daily Living Prior to Admission  Functional Status: Assistance  Completes ADLs independently?: No  Level of ADL dependence: Assistance (Some assistance with ADLs, but indepednently able to dress self)  Ambulates independently?: Yes  Does patient use assisted devices?: No  Does patient currently own DME?: No  Does patient have a history of Outpatient Therapy (PT/OT)?: No  Does the patient have a history of Short-Term Rehab?: Yes  Does patient have a history of HHC?: Yes  Does patient currently have HHC?: No    Patient Information Continued  Income Source: Pension/detention  Does patient have prescription coverage?: Yes  Does patient receive dialysis treatments?: No  Does patient have a history of substance abuse?: No    Means of Transportation  Means of Transport to Appts:: Family transport    Social Determinants of Health (SDOH)      Flowsheet Row Most Recent Value   Housing Stability    In the last 12 months, was there a time when you were not able to pay the mortgage or rent on time? N   At any time in the past 12 months, were you homeless or living in a shelter (including now)? N   Transportation Needs    In the past 12 months, has lack of transportation kept you from medical appointments or from getting medications? no   In the past 12 months, has lack of transportation kept you from meetings, work, or from getting things needed for daily living? No   Food Insecurity    Within the past 12 months, you worried that your food would run out before you got the money to buy more. Never true   Within the past 12 months, the food you bought just didn't last and you didn't have money to get more. Never true   Utilities    In  the past 12 months has the electric, gas, oil, or water company threatened to shut off services in your home? No            DISCHARGE DETAILS:    Discharge planning discussed with:: Pt and daughterYnes  Freedom of Choice: Yes  Comments - Freedom of Choice: Return to [facility]    Contacts  Patient Contacts: Ynes (dtr)  Relationship to Patient:: Family  Contact Method: Phone  Reason/Outcome: Continuity of Care, Emergency Contact, Referral, Discharge Planning    Other Referral/Resources/Interventions Provided:  Interventions: Other (Specify)  Referral Comments: CM met with pt at bedside and introduced self/role with dcp. Pt reports she is at [facility] while waiting for a room on their personal care unit. Pt confirmed plan is to return to [facility]. CM also spoke with pt's daughter, Ynes, who confirmed same information. Ynes reports depending on the time of day she could transport pt, but if unavailable will need transport to return.

## 2024-10-03 NOTE — ASSESSMENT & PLAN NOTE
Hyperkalemia chronic hyponatremia  Most likely hypervolemic hyponatremia  Most likely reason for hyponatremia is acute exacerbation of heart failure with preserved EF fraction

## 2024-10-03 NOTE — DISCHARGE SUMMARY
Discharge Summary - Hospitalist   Name: Farnaz Martin 79 y.o. female I MRN: [id number]  Unit/Bed#: S -01 I Date of Admission: 10/1/2024   Date of Service: 10/05/24 I Hospital Day: 4     Assessment & Plan  Acute hypoxic respiratory failure (HCC)  Wt Readings from Last 3 Encounters:   10/03/24 73.8 kg (162 lb 9.6 oz)   09/10/24 68.5 kg (151 lb)   08/23/24 70.3 kg (155 lb)   BNP: 1039  I&O: since admission -2980  Dry weight: 150-160 LB  updated weight 162 standing scale 10/2, 162.6 lb on 10/3  Patient has chronic indwelling pleural catheter   CT chest 10/1 shows decreasing volume of right-sided pleural effusion but increased volume of left-sided pleural effusion, perisplenic and perihepatic ascites, likely from cardiac portal hypertension  Per most recent cardiology note patient taking Bumex 2 mg daily  Patient is compliant with the medication, cardiac diet  Echo 7/14/2024 shows LVEF: 50%, pulmonary hypertension findings  Patient has AV paced pacemaker for tachybradycardia syndrome (the most recent interrogation was unremarkable)  Patient has atrial fibrillation  furosemide 40 mg IV once was given in ED  Calculated MELD score 21, 19.6% 3 month mortality   OT recommend moderate level of care      Plan:  Patient to be discharged on 3 mg of Bumex daily.  Patient will follow-up with BMP and CBC in 1 week  Patient will follow-up with PCP within 1 week of discharge  Patient will follow-up with cardiology outpatient  Hyponatremia  Hyperkalemia chronic hyponatremia  Most likely hypervolemic hyponatremia  Most likely reason for hyponatremia is acute exacerbation of heart failure with preserved EF fraction       Atrial fibrillation (HCC)  The most common reason for atrial fibrillation in this patient is pulmonary hypertension  The most recent TSH was unremarkable    Plan:  Continue home medication: Flecainide 150 mg twice daily, Toprol 75 mg twice daily, diltiazem 120 mg daily  Eliquis 2.5 mg twice daily  Diabetes  "mellitus type 2, insulin dependent (HCC)  Lab Results   Component Value Date    HGBA1C 8.8 (H) 07/13/2024       Recent Labs     10/02/24  1609 10/02/24  2106 10/03/24  0721 10/03/24  1120   POCGLU 139 176* 103 176*       Blood Sugar Average: Last 72 hrs:  (P) 155  Home dose: Dapagliflozin 5 mg daily, insulin 10 unit glargine bedtime    Raynaud phenomenon  Nifedipine can be considered with holding parameter  Closed fracture of multiple ribs of left side with routine healing  Status post fall about 2 weeks ago per chart review at nursing home  At time of fall patient had unremarkable chest x-ray  Patient currently not having any rib pain  Trauma consulted appreciate recommendations, x-ray left wrist, right knee, left hand completed with no acute findings     Medical Problems       Resolved Problems  Date Reviewed: 9/26/2024            Resolved    KIMBERLEY (acute kidney injury) ([facility]) 10/3/2024     Resolved by  Brooke Mendelson, DO    Hyperkalemia 10/2/2024     Resolved by  Brooke Mendelson, DO        Discharging Physician / Practitioner: Brooke Mendelson, DO  PCP: Naveen Monsivais MD  Admission Date:   Admission Orders (From admission, onward)       Ordered        10/01/24 2002  INPATIENT ADMISSION  Once                          Discharge Date: 10/05/2024    Consultations During Hospital Stay:  Trauma     Procedures Performed:   Drainage of Pleurex catheter     Significant Findings / Test Results:   Xray chest 10/3: \"Atelectasis at the right lung base.  Pleural effusion and likely atelectasis at the left lung base.  No pneumothorax. There is a drainage catheter at the right lung base.\"  Xray chest 10/1: \"Possible trace left pleural effusion with blunted left costophrenic angle. Mild left basilar atelectasis and/or airspace disease is seen. Stable enlarged cardiomediastinal silhouette and left chest cardiac pacing device.\"  EKG 10/1: \"AV dual-paced rhythm. When compared with ECG of 01-OCT-2024 16:38, (unconfirmed). Vent. rate " "has increased BY  62 BPM\"  Blood cultures 10/1: no growth at 48 hours   MRSA culture 10/1: negative     Incidental Findings:   Xray left wrist 10/2: \"Osteoarthritis of the first CMC joint\"   Xray right knee 10/2: \"Tricompartmental osteoarthritis most evident medially.\"  Xray left hand 10/2: \"There is osteoarthritis of the first CMC joint and the fifth PIP joint and the fifth DIP joint and the fourth DIP joint\"  CT chest w/o contrast 10/1: \"Interval placement of a right pleural drainage catheter with near complete resolution of right pleural effusion and only a small volume of fluid remaining posteriorly. Small amount of air has been introduced into the right pleural cavity consistent with a small right hydropneumothorax.Interval increased size of a left pleural effusion. Bibasilar atelectasis. Stable anterior mediastinal cystic nodule measuring 1.5 cm of uncertain etiology. Differential discussed above. Increased perihepatic and perisplenic ascites. Acute left anterolateral fractures involving the fifth, sixth, seventh, eighth ribs. Correlate for any recent falls\"  I reviewed the above mentioned incidental findings with the patient and/or family and they expressed understanding.    Test Results Pending at Discharge (will require follow up):   None     Outpatient Tests Requested:  None    Complications:  None     Reason for Admission: Acute hypoxic respiratory failure     Hospital Course:   Farnaz Martin is a 79 y.o. 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 She generally feels better post drainage of her right sided pleural effusion " however after completing this patient noted no difference. In the ED patient required BiPAP due to respiratory distress. She was able to be weaned to mid flow prior to transfer to stepdown unit. Patient currently receiving Bumex IV 3.25 mg twice daily while admitted and was taking Bumex 2 mg daily at home. CT imaging on admission showed new left-sided effusion as well as evidence of intra-abdominal ascites. Patient does report increased abdominal bloating. Patient was maintaining adequate saturations on 1: to 2: supplemental o2 and was eventually taken off oxygen. Repeated x ray throughout hospital course to see if pleural effusion is improving. Right sided pleural catheter drained on 10/3 with 525 cc fluid removed. The most accurate weight was taken on 10/2 of 162 lbs and on 10/3 162 lbs. Since admission, patient has had a total output of -2980 mL.     In addition, it was noted on ED CT chest, multiple left sided rib fractures likely traumatic cause. Patient fell a couple weeks ago at nursing facility and was evaluated at the time however xray did not show fractures. Trauma was consulted due to left pleural effusion in context of trauma. At the time, patient was no longer having rib pain and they did not recommend any other interventions. Patient complained of wrist, hand, and knee pain following fall. Xrays were done of the regions with no acute findings or fractures, arthritic changes present.     Please see above list of diagnoses and related plan for additional information.     Condition at Discharge: stable    Discharge Day Visit / Exam:   Subjective:  Patient feels well this morning, she states she did not require oxygen overnight, she has no active complaints.  Vitals: Blood Pressure: 112/67 (10/03/24 2249)  Pulse: 94 (10/03/24 2249)  Temperature: 97.7 °F (36.5 °C) (10/03/24 2249)  Temp Source: Axillary (10/02/24 0751)  Respirations: 17 (10/03/24 1508)  Weight - Scale: 73.8 kg (162 lb 9.6 oz) (10/03/24  0900)  SpO2: 94 % (10/03/24 2249)  Physical Exam  Vitals reviewed.   Constitutional:       General: She is not in acute distress.     Appearance: She is obese. She is not ill-appearing.   HENT:      Mouth/Throat:      Mouth: Mucous membranes are moist.   Eyes:      Extraocular Movements: Extraocular movements intact.      Pupils: Pupils are equal, round, and reactive to light.   Cardiovascular:      Rate and Rhythm: Normal rate and regular rhythm.      Pulses: Normal pulses.      Heart sounds: Normal heart sounds. No murmur heard.     No friction rub. No gallop.   Pulmonary:      Breath sounds: No wheezing, rhonchi or rales.      Comments: Right-sided pleural fluid catheter, no drainage or pain at site   Abdominal:      General: Bowel sounds are normal.      Palpations: Abdomen is soft. There is no mass.      Tenderness: There is no abdominal tenderness.      Hernia: No hernia is present.   Musculoskeletal:      Right lower leg: No edema.      Left lower leg: No edema.   Skin:     General: Skin is warm and dry.   Neurological:      General: No focal deficit present.      Mental Status: She is alert and oriented to person, place, and time.   Psychiatric:         Mood and Affect: Mood normal.        Discussion with Family: Updated  (daughter) via phone.    Discharge instructions/Information to patient and family:   See after visit summary for information provided to patient and family.      Provisions for Follow-Up Care:  See after visit summary for information related to follow-up care and any pertinent home health orders.      Mobility at time of Discharge:   Basic Mobility Inpatient Raw Score: 16  JH-HLM Goal: 5: Stand one or more mins  JH-HLM Achieved: 7: Walk 25 feet or more  HLM Goal achieved. Continue to encourage appropriate mobility.     Disposition:   Other Skilled Nursing Facility at [facility]    Planned Readmission: No    Discharge Medications:  See after visit summary for reconciled discharge  medications provided to patient and/or family.      Administrative Statements   Discharge Statement:  I have spent a total time of 60 minutes in caring for this patient on the day of the visit/encounter. >30 minutes of time was spent on: Diagnostic results, Prognosis, Risks and benefits of tx options, Instructions for management, Patient and family education, Importance of tx compliance, Risk factor reductions, Counseling / Coordination of care, Documenting in the medical record, Reviewing / ordering tests, medicine, procedures  , and Communicating with other healthcare professionals .    **Please Note: This note may have been constructed using a voice recognition system**

## 2024-10-03 NOTE — ASSESSMENT & PLAN NOTE
Status post fall with left rib and left hand pain  CXR and left hand x-ray shows no fractures  Continue tylenol prn for pain. Patient states that she prefers to ask for medication when she needs it.   4% Lidocaine patch ordered to left ribs daily, off at HS.

## 2024-10-03 NOTE — ASSESSMENT & PLAN NOTE
Patient appears euvolemic in examination  Weight for the past week stable. Weight on 09/19 was 156.3 lbs, today weight is 154.6 lbs.  BNP decreased from 1270 on 08/05/24 to 1118 on 09/23/3034  Continue Bumex and toprol    Will continue to monitor for volume overload.   Repeat blood work ordered for the am

## 2024-10-03 NOTE — PROGRESS NOTES
Patient:  TANISHA LEVINE    MRN:  [id number]    Aidin Request ID:  [id number]    Level of care reserved:  Skilled Nursing Facility    Partner Reserved:  [facility], [place] [address] [phone]    Clinical needs requested:    Geography searched:  10 miles around [id number]    Start of Service:    Request sent:  2:27pm EDT on 10/3/2024 by Lincoln Yuan    Partner reserved:  3:09pm EDT on 10/3/2024 by Lincoln Yuan    Choice list shared:  3:06pm EDT on 10/3/2024 by Lincoln Yuan

## 2024-10-03 NOTE — ASSESSMENT & PLAN NOTE
Status post fall on 09/15/2024  Original chest x-ray revealed no acute rib fractures  Repeat rib study now reveals 6th, 7th and 8th rib fractures  Continue pain management with scheduled tylenol, prn OxyIR   IS encouraged  Will monitor respiratory status.

## 2024-10-03 NOTE — ASSESSMENT & PLAN NOTE
Status post fall with left rib and left hand pain  CXR and left hand x-ray shows no fractures  Repeat imaging, rib study reviewed and shows very difficult to visualize left lateral 5th and 6th rib fractures. Consider CT for best characterization.  Unclear if there are definitive rib fractures based on read. Will ask for clarification from radiologist.   Continue tylenol prn for pain and prn OxyIR   4% Lidocaine patch ordered to left ribs daily, off at HS.

## 2024-10-03 NOTE — PLAN OF CARE
Problem: PHYSICAL THERAPY ADULT  Goal: Performs mobility at highest level of function for planned discharge setting.  See evaluation for individualized goals.  Description: Treatment/Interventions: Functional transfer training, LE strengthening/ROM, Endurance training, Therapeutic exercise, Cognitive reorientation, Patient/family training, Equipment eval/education, Bed mobility, Compensatory technique education, Gait training  Equipment Recommended: Walker       See flowsheet documentation for full assessment, interventions and recommendations.  10/3/2024 1317 by Aspen Lobato PT  Note: Prognosis: Good  Problem List: Decreased strength, Decreased endurance, Impaired balance, Decreased mobility, Decreased safety awareness, Decreased cognition  Assessment: Farnaz Martin is a 79 y.o. Female who [facility] to [facility] on 10/1/[facility] due to respiratory distress. Orders for PT eval and treat received, w/ activity orders of ambulate patient. Comorbidities affecting pt's functional mobility at time of evaluation include: KIMBERLEY, DM, L rib fxs, fibromyalgia, HTN, osteoarthritis. Personal factors affecting DC include: ambulating w/ assistive device, inability to navigate level surfaces w/o external assistance, decreased cognition, and inability to perform IADLs. PTA pt was ambulating w/ a RW. Upon evaluation, pt presents w/ the following deficits: impaired strength, impaired hearing, decreased endurance/activity tolerance, and gait deviations. Pt currently requires supervision for bed mobility, min Ax1 for transfers, min Ax1 w/ RW for ambulation. Pt's clinical presentation is unstable/unpredictable due to abnormal lab values, need for increased assistance w/ functional mobility compared to baseline, requiring supplemental O2 in order to maintain O2 saturation, need to input for safety awareness, ongoing medical management. From a PT/mobility standpoint given the above findings, DC recommendation is level: II (Moderate Rehab Resource  Intensity). During current admission, pt will benefit from continued skilled inpatient PT in the acute care setting in order to address the above deficits and to maximize function and mobility prior to DC from acute care.  Barriers to Discharge: Decreased caregiver support (if pt were to DC to home environment where she lives alone)     Rehab Resource Intensity Level, PT: II (Moderate Resource Intensity)    See flowsheet documentation for full assessment.

## 2024-10-04 NOTE — PLAN OF CARE
Problem: PHYSICAL THERAPY ADULT  Goal: Performs mobility at highest level of function for planned discharge setting.  See evaluation for individualized goals.  Description: Treatment/Interventions: Functional transfer training, LE strengthening/ROM, Endurance training, Therapeutic exercise, Cognitive reorientation, Patient/family training, Equipment eval/education, Bed mobility, Compensatory technique education, Gait training  Equipment Recommended: Walker       See flowsheet documentation for full assessment, interventions and recommendations.  Outcome: Progressing  Note: Prognosis: Good  Problem List: Decreased strength, Decreased endurance, Impaired balance, Decreased safety awareness, Decreased mobility  Assessment: PT treatment provided today to address deficits of: impaired balance, gait deviations, limited activity tolerance. Interventions provided include: bed mobility, transfer, and multiple gait trials in order to challenge pt's overall activity tolerance and to maximize pt independence w/ functional mobility during current admission w/ acute hypoxic respiratory failure. Compared to previous session pt w/ improved overall activity tolerance w/ decreased amount of assistance required. Pt demonstrated ability to perform multiple ambulation trials w/ the RW and a trial w/o an AD. Pt will continue benefit from PT to promote independence w/ functional mobility, address mobility deficits, and progress towards set goals. Recommend DC w/ level: III (Minimum Rehab Resource Intensity) when medically cleared. DC rec updated due to pt's progress w/ functional mobility.  Barriers to Discharge: Decreased caregiver support (if pt were to DC to home environment where she lives alone)     Rehab Resource Intensity Level, PT: III (Minimum Resource Intensity)    See flowsheet documentation for full assessment.

## 2024-10-04 NOTE — ASSESSMENT & PLAN NOTE
Wt Readings from Last 3 Encounters:   10/04/24 73.6 kg (162 lb 5.9 oz)   09/10/24 68.5 kg (151 lb)   08/23/24 70.3 kg (155 lb)   BNP: 1039  I&O: since admission -2980  Dry weight: 150-160 LB  updated weight 162 standing scale 10/2, 162.6 lb on 10/3, 162 LB 5.9 oz on 10/4  Patient has chronic indwelling pleural catheter   CT chest 10/1 shows decreasing volume of right-sided pleural effusion but increased volume of left-sided pleural effusion, perisplenic and perihepatic ascites, likely from cardiac portal hypertension  Per most recent cardiology note patient taking Bumex 2 mg daily  Patient is compliant with the medication, cardiac diet  Echo 7/14/2024 shows LVEF: 50%, pulmonary hypertension findings  Patient has AV paced pacemaker for tachybradycardia syndrome (the most recent interrogation was unremarkable)  Patient has atrial fibrillation  furosemide 40 mg IV once was given in ED  Calculated MELD score 21, 19.6% 3 month mortality   OT recommend moderate level of care      Plan:  Continue 3.25 mg IV Bumex twice daily, pending today's diuresis, will plan to transition to PO bumex 3 mg daily starting tomorrow   Will monitor CR as in past admission patient's CR increased with diuresis, CR is within normal limits on 10/3-10/4   Monitor I/O, daily weight  Goal of I/O's -700 to 1 L net in 24-hour  Cardiac diet, fluid restriction, salt restriction  Decreased oxygen demand, patient on room air currently   Elevate the head  X-ray chest today  Right pleural catheter drained 10/3 of 525 mL

## 2024-10-04 NOTE — PHYSICAL THERAPY NOTE
PHYSICAL THERAPY TREATMENT NOTE          Patient Name: Farnaz Martin  Today's Date: 10/4/2024          AGE:   79 y.o.  Mrn:   [id number]  ADMIT DX:  Hyperkalemia [E87.5]  Lactic acidosis [E87.20]  CHF (congestive heart failure) ([facility]) [I50.9]  Hyponatremia [E87.1]  Respiratory distress [R06.03]  Pleural effusion [J90]  KIMBERLEY (acute kidney injury) ([facility]) [N17.9]  Acute respiratory failure with hypoxia ([facility]) [J96.01]    Past Medical History:  Past Medical History:   Diagnosis Date    Actinic keratosis     last assessed - 06Jun2014    Arthritis     Atrial fibrillation with rapid ventricular response (HCC)     last assessed - 26Apr2016    Atrial fibrillation with rapid ventricular response ([facility]) 04/19/2016    Basal cell carcinoma     Benign colon polyp     last assessed - 27Apr2015    Bronchiectasis without complication (HCC)     CHF (congestive heart failure) ([facility]) 06/2022    Chronic diastolic CHF (congestive heart failure) ([facility])     Disease of thyroid gland     Effusion of knee joint right     Resolved - 19Apr2016    Esophageal reflux     Fibromyalgia     last assessed - [id number]    Fibromyalgia, primary     GERD (gastroesophageal reflux disease)     Hyperlipidemia     Hypertension     Hyponatremia     Malignant neoplasm of thyroid gland (HCC)     Meningioma (HCC)     MGUS (monoclonal gammopathy of unknown significance)     Palpitations     last assessed - 30Apr2013    Peroneal tendonitis, right     last assessed - 02Oct2013    Right lumbar radiculopathy 03/17/2016    Thyroid cancer (HCC)     Thyroid nodule     Trochanteric bursitis of left hip 03/09/2018        10/04/24 1148   PT Last Visit   PT Visit Date 10/04/24   Note Type   Note Type Treatment   Pain Assessment   Pain Assessment Tool 0-10   Pain Score No Pain   Restrictions/Precautions   Weight Bearing Precautions Per Order No   Other Precautions Cognitive;Chair Alarm;Bed Alarm;Fall Risk   General   Chart  Reviewed Yes   Additional Pertinent History SpO2 >88% on RA w/ good waveform observed on [place]   Family/Caregiver Present No   Cognition   Overall Cognitive Status Impaired   Arousal/Participation Alert;Cooperative   Attention Attends with cues to redirect   Orientation Level Oriented X4   Memory Decreased recall of recent events   Following Commands Follows one step commands without difficulty   Comments Pt agreeable and motivated for PT tx and mobility   Bed Mobility   Supine to Sit 5  Supervision   Additional items Assist x 1;HOB elevated;Bedrails;Increased time required;Verbal cues   Transfers - 4 total transfers including a toilet transfer    Sit to Stand 5  Supervision   Additional items Assist x 1;Increased time required;Verbal cues   Stand to Sit 5  Supervision   Additional items Assist x 1;Armrests;Verbal cues   Ambulation/Elevation   Gait pattern Decreased foot clearance;Improper Weight shift;Short stride   Gait Assistance 5  Supervision   Additional items Assist x 1;Verbal cues   Assistive Device Rolling walker;None  (40' w/o AD, all other ambulation trials w/ RW)   Distance 65'+40'+80'+20'x2   Balance   Static Sitting Good   Dynamic Sitting Fair +   Static Standing Fair   Dynamic Standing Fair -   Ambulatory Fair -  (w/ and w/o RW)   Activity Tolerance   Activity Tolerance Patient tolerated treatment well   Medical Staff Made Aware SANDEEP Stark   Nurse Made Aware COLLEEN Rashid   Assessment   Prognosis Good   Problem List Decreased strength;Decreased endurance;Impaired balance;Decreased safety awareness;Decreased mobility   Assessment PT treatment provided today to address deficits of: impaired balance, gait deviations, limited activity tolerance. Interventions provided include: bed mobility, transfer, and multiple gait trials in order to challenge pt's overall activity tolerance and to maximize pt independence w/ functional mobility during current admission w/ acute hypoxic respiratory failure. Compared to  "previous session pt w/ improved overall activity tolerance w/ decreased amount of assistance required. Pt demonstrated ability to perform multiple ambulation trials w/ the RW and a trial w/o an AD. Pt will continue benefit from PT to promote independence w/ functional mobility, address mobility deficits, and progress towards set goals. Recommend DC w/ level: III (Minimum Rehab Resource Intensity) when medically cleared. DC rec updated due to pt's progress w/ functional mobility.   Goals   Patient Goals \"get back to my own room up there\"   [facility] Expiration Date 10/13/24   Short Term Goal #1 Pt will: perform bed mobility w/ mod I to decrease pt's burden of care and increase pt's independence w/ repositioning in bed; perform transfers w/ mod I to promote OOB mobility; ambulate at least 150' w/ LRAD and mod I to increase pt's ambulatory endurance/tolerance; increase all balance ratings by at least 1 grade to decrease pt's risk of falls   PT Treatment Day 1   Plan   Treatment/Interventions Functional transfer training;LE strengthening/ROM;Therapeutic exercise;Endurance training;Patient/family training;Equipment eval/education;Bed mobility;Gait training;Compensatory technique education   Progress Progressing toward goals   PT Frequency 3-5x/wk   Discharge Recommendation   Rehab Resource Intensity Level, PT III (Minimum Resource Intensity)   Equipment Recommended Walker   Walker Package Recommended Wheeled walker   Change/add to Walker Package? No   AM-PAC Basic Mobility Inpatient   Turning in Flat Bed Without Bedrails 3   Lying on Back to Sitting on Edge of Flat Bed Without Bedrails 3   Moving Bed to Chair 3   Standing Up From Chair Using Arms 3   Walk in Room 3   Climb 3-5 Stairs With Railing 2   Basic Mobility Inpatient Raw Score 17   Basic Mobility Standardized Score 39.67   [facility] Highest Level Of Mobility   -[facility] Goal 5: Stand one or more mins   -HL Achieved 7: Walk 25 feet or more   Education   Education " Provided Mobility training;Assistive device   Patient Demonstrates acceptance/verbal understanding   End of Consult   Patient Position at End of Consult Bedside chair;Bed/Chair alarm activated;All needs within reach         The patient's AM-PAC Basic Mobility Inpatient Short Form Raw Score is 17. A Raw score of greater than 16 suggests the patient may benefit from discharge to home. Please also refer to the recommendation of the Physical Therapist for safe discharge planning.    Pt will continue to benefit from skilled inpatient PT during this admission in order to facilitate progress towards goals and to maximize pt's independence w/ functional mobility.    DC rec: level III (Minimum Rehab Resource Intensity)      Aspen Lobato, PT, DPT  10/04/24

## 2024-10-04 NOTE — PROGRESS NOTES
Pt is a/o x4.  No complaints at this time.  Patient O2 sat decreased to low 80's, applied 2L NC for HS.  Safety measures are in place.

## 2024-10-04 NOTE — PROGRESS NOTES
Progress Note - Hospitalist   Name: Farnaz Martin 79 y.o. female I MRN: [id number]  Unit/Bed#: S -01 I Date of Admission: 10/1/2024   Date of Service: 10/4/2024 I Hospital Day: 3    Assessment & Plan  Acute hypoxic respiratory failure (HCC)  Wt Readings from Last 3 Encounters:   10/04/24 73.6 kg (162 lb 5.9 oz)   09/10/24 68.5 kg (151 lb)   08/23/24 70.3 kg (155 lb)   BNP: 1039  I&O: since admission -2980  Dry weight: 150-160 LB  updated weight 162 standing scale 10/2, 162.6 lb on 10/3, 162 LB 5.9 oz on 10/4  Patient has chronic indwelling pleural catheter   CT chest 10/1 shows decreasing volume of right-sided pleural effusion but increased volume of left-sided pleural effusion, perisplenic and perihepatic ascites, likely from cardiac portal hypertension  Per most recent cardiology note patient taking Bumex 2 mg daily  Patient is compliant with the medication, cardiac diet  Echo 7/14/2024 shows LVEF: 50%, pulmonary hypertension findings  Patient has AV paced pacemaker for tachybradycardia syndrome (the most recent interrogation was unremarkable)  Patient has atrial fibrillation  furosemide 40 mg IV once was given in ED  Calculated MELD score 21, 19.6% 3 month mortality   OT recommend moderate level of care      Plan:  Continue 3.25 mg IV Bumex twice daily, pending today's diuresis, will plan to transition to PO bumex 3 mg daily starting tomorrow   Will monitor CR as in past admission patient's CR increased with diuresis, CR is within normal limits on 10/3-10/4   Monitor I/O, daily weight  Goal of I/O's -700 to 1 L net in 24-hour  Cardiac diet, fluid restriction, salt restriction  Decreased oxygen demand, patient on room air currently   Elevate the head  X-ray chest today  Right pleural catheter drained 10/3 of 525 mL  Hyponatremia  Hyperkalemia chronic hyponatremia  Most likely hypervolemic hyponatremia  Most likely reason for hyponatremia is acute exacerbation of heart failure with preserved EF  fraction  10/3, 131    Plan:  Diuretic, continue to monitor on AM labs   Correction rate is 6/24-hour  Atrial fibrillation (HCC)  The most common reason for atrial fibrillation in this patient is pulmonary hypertension  The most recent TSH was unremarkable    Plan:  Continue home medication: Flecainide 150 mg twice daily, Toprol 75 mg twice daily, diltiazem 120 mg daily  Eliquis 2.5 mg twice daily  Diabetes mellitus type 2, insulin dependent (HCC)  Lab Results   Component Value Date    HGBA1C 8.8 (H) 07/13/2024     Recent Labs     10/03/24  1705 10/03/24  2107 10/04/24  0718 10/04/24  1203   POCGLU 136 177* 84 140     Blood Sugar Average: Last 72 hrs:  (P) [id number]  Home dose: Dapagliflozin 5 mg daily, insulin 10 unit glargine bedtime    Plan:  Hold dapagliflozin  SSI  Insulin glargine 5 unit, because of unsure about unsure about oral food intake during hospitalization  Raynaud phenomenon  Nifedipine can be considered with holding parameter  Closed fracture of multiple ribs of left side with routine healing  Status post fall about 2 weeks ago per chart review at nursing home  At time of fall patient had unremarkable chest x-ray  Patient currently not having any rib pain  Trauma consulted appreciate recommendations, x-ray left wrist, right knee, left hand completed with no acute findings    VTE Pharmacologic Prophylaxis: VTE Score: 5 High Risk (Score >/= 5) - Pharmacological DVT Prophylaxis Ordered: apixaban (Eliquis). Sequential Compression Devices Ordered.    Mobility:   Basic Mobility Inpatient Raw Score: 17  JH-HLM Goal: 5: Stand one or more mins  JH-HLM Achieved: 7: Walk 25 feet or more  JH-HLM Goal achieved. Continue to encourage appropriate mobility.    Patient Centered Rounds: I performed bedside rounds with nursing staff today.   Discussions with Specialists or Other Care Team Provider: None    Education and Discussions with Family / Patient: Updated  (daughter) via  phone.    Current Length of Stay: 3 day(s)  Current Patient Status: Inpatient   Certification Statement: The patient will continue to require additional inpatient hospital stay due to continued diuretics  Discharge Plan: Anticipate discharge tomorrow to prior assisted or independent living facility.    Code Status: Level 3 - DNAR and DNI    Subjective   Patient was seen sitting up in recliner this morning waiting for her breakfast.  She reports this morning they needed to put the oxygen back on her due to desatting to the 80s.  She currently does not have any oxygen on at this time and is satting in the mid 90s.  She reports no shortness of breath when she was moving this morning to bathroom and to recliner overnight she used her walker to go to the bathroom and she lost her balance however did not experience any shortness of breath.  Patient reports no history of AKIN and that this was checked a few years ago with a sleep study.  She feels like she can breathe well with good airflow.  She feels that her abdomen is a little less distended than previous.  She reports yesterday afternoon her rib pain got worse especially when she took deep breaths and however with Tylenol this has been improved.  Patient reports a chronic cough due to bronchiostasis.  Patient denies fever, chills, abdominal pain, difficulty urinating, nausea, vomiting, diarrhea, chest pain, heart palpitations, rib pain.    Per nursing, patient had an episode of desaturation when she got out of bed this morning to the 80's which caused patient to be placed on 2 L NC, however later this morning the monitor showed decreased saturation again when patient got up, making it more likely the monitor was not getting an accurate read on patient's current oxygen saturation.     Objective :  Temp:  [97.7 °F (36.5 °C)-98.2 °F (36.8 °C)] 98.2 °F (36.8 °C)  HR:  [61-98] 67  BP: ()/(43-68) 110/55  Resp:  [15-17] 15  SpO2:  [91 %-97 %] 95 %  O2 Device: None (Room  air)    Body mass index is 29.7 kg/m².     Input and Output Summary (last 24 hours):     Intake/Output Summary (Last 24 hours) at 10/4/2024 1402  Last data filed at 10/4/2024 1100  Gross per 24 hour   Intake 720 ml   Output 1530 ml   Net -810 ml       Physical Exam  Vitals reviewed.   Constitutional:       General: She is not in acute distress.     Appearance: She is obese. She is not ill-appearing, toxic-appearing or diaphoretic.   HENT:      Mouth/Throat:      Mouth: Mucous membranes are moist.   Eyes:      Extraocular Movements: Extraocular movements intact.      Pupils: Pupils are equal, round, and reactive to light.   Cardiovascular:      Rate and Rhythm: Normal rate and regular rhythm.      Pulses: Normal pulses.      Heart sounds: Normal heart sounds. No murmur heard.     No friction rub. No gallop.   Pulmonary:      Effort: No respiratory distress.      Breath sounds: No stridor. No wheezing, rhonchi or rales.      Comments: Right-sided pleural fluid catheter, no drainage or pain at site   Decreased breath sounds in lower fields bilaterally  Chest:      Chest wall: No tenderness.   Abdominal:      General: Bowel sounds are normal. There is distension (less than previous).      Palpations: Abdomen is soft. There is no mass.      Tenderness: There is no abdominal tenderness.      Hernia: No hernia is present.   Musculoskeletal:         General: No tenderness (Anterior chest).      Right lower leg: No edema.      Left lower leg: No edema.   Skin:     General: Skin is warm and dry.   Neurological:      General: No focal deficit present.      Mental Status: She is alert and oriented to person, place, and time.   Psychiatric:         Mood and Affect: Mood normal.       Lines/Drains:  Lines/Drains/Airways       Active Status       Name Placement date Placement time Site Days    Pleural Effusion Long-Term Catheter 16 Fr. 08/19/24  0852  --  46                      Telemetry:  Telemetry Orders (From admission,  onward)               24 Hour Telemetry Monitoring  Continuous x 24 Hours (Telem)        Question:  Reason for 24 Hour Telemetry  Answer:  Decompensated CHF- and any one of the following: continuous diuretic infusion or total diuretic dose >200 mg daily, associated electrolyte derangement (I.e. K < 3.0), ionotropic drip (continuous infusion), hx of ventricular arrhythmia, or new EF < 35%                     Telemetry Reviewed:  Pacing, artifact, V. tach at 184 bpm at 7:43 PM last night, tachycardia at 189 bpm at 10 AM this morning, increase in ACC vent this morning  Indication for Continued Telemetry Use: Acute CHF on >200 mg lasix/day or equivalent dose or with new reduced EF.                Lab Results: I have reviewed the following results:   Results from last 7 days   Lab Units 10/04/24  0537   WBC Thousand/uL 6.90   HEMOGLOBIN g/dL 10.5*   HEMATOCRIT % 33.1*   PLATELETS Thousands/uL 240   SEGS PCT % 55   LYMPHO PCT % 27   MONO PCT % 13*   EOS PCT % 4     Results from last 7 days   Lab Units 10/04/24  0537 10/03/24  0516   SODIUM mmol/L 134* 131*   POTASSIUM mmol/L 3.7 4.2   CHLORIDE mmol/L 99 99   CO2 mmol/L 28 27   BUN mg/dL 34* 38*   CREATININE mg/dL 1.03 1.23   ANION GAP mmol/L 7 5   CALCIUM mg/dL 8.5 8.9   ALBUMIN g/dL  --  3.3*   TOTAL BILIRUBIN mg/dL  --  0.91   ALK PHOS U/L  --  85   ALT U/L  --  62*   AST U/L  --  59*   GLUCOSE RANDOM mg/dL 81 109     Results from last 7 days   Lab Units 10/02/24  0505   INR  1.75*     Results from last 7 days   Lab Units 10/04/24  1203 10/04/24  0718 10/03/24  2107 10/03/24  1705 10/03/24  1120 10/03/24  0721 10/02/24  2106 10/02/24  1609 10/02/24  1157 10/02/24  0801 10/02/24  0022 10/01/24  2143   POC GLUCOSE mg/dl 140 84 177* 136 176* 103 176* 139 172* 152* 214* 108         Results from last 7 days   Lab Units 10/01/24  1813 10/01/24  1650   LACTIC ACID mmol/L 1.7 2.2*   PROCALCITONIN ng/ml  --  0.14       Recent Cultures (last 7 days):   Results from last 7 days  "  Lab Units 10/01/24  1650   BLOOD CULTURE  No Growth at 48 hrs.  No Growth at 48 hrs.       Imaging Results Review: I reviewed radiology reports from this admission including: chest xray.  Chest xray 10/3: \"Atelectasis at the right lung base.  Pleural effusion and likely atelectasis at the left lung base.  No pneumothorax. There is a drainage catheter at the right lung base.\"        Last 24 Hours Medication List:     Current Facility-Administered Medications:     acetaminophen (TYLENOL) tablet 975 mg, Q8H JOAQUINA    albuterol (PROVENTIL HFA,VENTOLIN HFA) inhaler 2 puff, Q6H PRN    aluminum-magnesium hydroxide-simethicone (MAALOX) oral suspension 30 mL, Q6H PRN    apixaban (ELIQUIS) tablet 2.5 mg, BID    ascorbic acid (VITAMIN C) tablet 500 mg, HS    benzonatate (TESSALON PERLES) capsule 100 mg, TID PRN    bumetanide (BUMEX) injection 3.25 mg, BID (diuretic)    Cholecalciferol (VITAMIN D3) tablet 2,000 Units, BID    diltiazem (CARDIZEM CD) 24 hr capsule 120 mg, Daily    ezetimibe (ZETIA) tablet 10 mg, HS    famotidine (PEPCID) tablet 20 mg, Daily    flecainide (TAMBOCOR) tablet 150 mg, BID    gabapentin (NEURONTIN) capsule 300 mg, HS    guaiFENesin (ROBITUSSIN) oral liquid 100 mg, TID PRN    insulin glargine (LANTUS) subcutaneous injection 5 Units 0.05 mL, HS    insulin lispro (HumALOG/ADMELOG) 100 units/mL subcutaneous injection 1-6 Units, TID AC **AND** Fingerstick Glucose (POCT), TID AC    insulin lispro (HumALOG/ADMELOG) 100 units/mL subcutaneous injection 1-6 Units, HS    levalbuterol (XOPENEX) inhalation solution 1.25 mg, TID    loratadine (CLARITIN) tablet 5 mg, Daily    melatonin tablet 3 mg, HS    metoprolol succinate (TOPROL-XL) 24 hr tablet 75 mg, Q12H JOAQUINA    polyethylene glycol (MIRALAX) packet 17 g, Daily PRN    rOPINIRole (REQUIP) tablet 2 mg, HS    sevelamer (RENAGEL) tablet 800 mg, TID With Meals    sodium chloride (OCEAN) 0.65 % nasal spray 1 spray, Q1H PRN    trimethobenzamide (TIGAN) IM injection 200 " mg, Q6H PRN    umeclidinium 62.5 mcg/actuation inhaler AEPB 1 puff, Daily    zolpidem (AMBIEN) tablet 10 mg, HS    Administrative Statements   Today, Patient Was Seen By: Brooke Mendelson, DO  I have spent a total time of 30 minutes in caring for this patient on the day of the visit/encounter including Diagnostic results, Prognosis, Risks and benefits of tx options, Instructions for management, Patient and family education, Importance of tx compliance, Risk factor reductions, Impressions, Counseling / Coordination of care, Documenting in the medical record, Reviewing / ordering tests, medicine, procedures  , Obtaining or reviewing history  , and Communicating with other healthcare professionals . Topics discussed with the patient / family include symptom assessment and management, medication review, medication adjustment, psychosocial support, and supportive listening.    **Please Note: This note may have been constructed using a voice recognition system.**

## 2024-10-04 NOTE — CASE MANAGEMENT
Case Management Discharge Planning Note    Patient name Farnaz Martin  Location S /S -01 MRN [id number]  : 1945 Date 10/4/2024       Current Admission Date: 10/1/2024  Current Admission Diagnosis:Acute hypoxic respiratory failure (HCC)   Patient Active Problem List    Diagnosis Date Noted Date Diagnosed    Raynaud phenomenon 10/02/2024     Closed fracture of multiple ribs of left side with routine healing 10/02/2024     Right knee pain 10/02/2024     Left hand pain 10/02/2024     Atrial fibrillation (HCC) 10/01/2024     Diabetes mellitus type 2, insulin dependent ([facility]) 10/01/2024     Rib pain on left side 2024     Gait instability 2024     Sensorineural hearing loss (SNHL) of both ears 2024     Palliative care patient 2024     Shortness of breath 2024     Thyroid nodule 2024     Acute kidney injury superimposed on CKD (chronic kidney disease) stage 3, GFR 30-59 ml/min ([facility]) 2024     Fatigue 2024     Recurrent pleural effusion on right 2024     Syncope and collapse 2024     Fall 2024     Type 2 diabetes mellitus with microalbuminuria, without long-term current use of insulin ([facility]) 2024     Paroxysmal A-fib ([facility]) 2024     Ambulatory dysfunction 2024     Hyponatremia 2024     Bilateral lower extremity edema 2024     Left renal mass 2024     Acute cough 2024     Bronchiectasis without complication ([facility]) 2024     Multiple thyroid nodules 2024     Abnormal CT of the chest 2023     S/P revision of total knee, left 2023    Nonrheumatic mitral valve regurgitation 2023     Acute on chronic heart failure (HCC) 2022     Elevated serum creatinine 2022     Meningioma ([facility]) 2022     Chronic tension-type headache, not intractable 2022     Vasomotor rhinitis 2021     MGUS (monoclonal gammopathy of unknown significance) 2021      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     Tremors of nervous system 04/01/2021     Hx of diplopia 04/01/2021     S/P placement of cardiac pacemaker 03/24/2021     History of sick sinus syndrome s/p PPM 03/2021     Current use of long term anticoagulation 02/04/2021     Malignant neoplasm of thyroid gland (HCC) 01/11/2021     Acute hypoxic respiratory failure (HCC) 01/03/2021     Goals of care, counseling/discussion 12/09/2020     Chronic rhinitis 11/10/2020     Arthritis of both acromioclavicular joints 03/06/2020     Carpal tunnel syndrome on right 11/01/2019     Osteoarthritis of shoulder      TMJ syndrome 10/18/2017     Essential hypertension 04/19/2016     Peripheral neuropathy 03/07/2016     Lumbar spondylosis 06/29/2015     Lumbar stenosis 06/29/2015     Restless legs syndrome 07/09/2014     Allergic rhinitis 04/01/2013     Osteoarthritis of knee 04/01/2013     Insomnia 01/04/2013     Osteopenia 11/26/2012     Fibromyalgia 10/16/2012     Hyperlipidemia 10/16/2012     Generalized osteoarthritis of hand 10/16/2012     Vitamin D deficiency 10/16/2012       LOS (days): 3  Geometric Mean LOS (GMLOS) (days): 3.9  Days to GMLOS:1.1     OBJECTIVE:  Risk of Unplanned Readmission Score: 56.19         Current admission status: Inpatient   Preferred Pharmacy:   [id number] [place] - [address]  [address]  [place] [address]  Phone: [phone] Fax: [phone]    [facility] [place] - [address] [place] [address]  [address] [facility]  [address]  [facility] [address]  Phone: [phone] Fax: [phone]    Primary Care Provider: Naveen Monsivais MD    Primary Insurance: MEDICARE  Secondary Insurance: AAR    DISCHARGE DETAILS:  [facility] notified via [employer], of potential d/c over the weekend.     [facility]  Receiving Facility/Agency Phone Number: [phone]  Facility/Agency Fax Number: [phone]

## 2024-10-04 NOTE — ASSESSMENT & PLAN NOTE
Lab Results   Component Value Date    HGBA1C 8.8 (H) 07/13/2024     Recent Labs     10/03/24  1705 10/03/24  2107 10/04/24  0718 10/04/24  1203   POCGLU 136 177* 84 140     Blood Sugar Average: Last 72 hrs:  (P) [id number]  Home dose: Dapagliflozin 5 mg daily, insulin 10 unit glargine bedtime    Plan:  Hold dapagliflozin  SSI  Insulin glargine 5 unit, because of unsure about unsure about oral food intake during hospitalization

## 2024-10-04 NOTE — OCCUPATIONAL THERAPY NOTE
"  Occupational Therapy Progress Note     Patient Name: Farnaz Martin  Today's Date: 10/4/2024  Problem List  Principal Problem:    Acute hypoxic respiratory failure (HCC)  Active Problems:    Hyponatremia    Fall    Atrial fibrillation (HCC)    Diabetes mellitus type 2, insulin dependent (HCC)    Raynaud phenomenon    Closed fracture of multiple ribs of left side with routine healing    Right knee pain    Left hand pain       10/04/24 1337   OT Last Visit   OT Visit Date 10/04/24   Note Type   Note Type Treatment   Pain Assessment   Pain Assessment Tool 0-10   Pain Score No Pain   Restrictions/Precautions   Weight Bearing Precautions Per Order No   Other Precautions Cognitive;Chair Alarm;Bed Alarm;Fall Risk   Lifestyle   Autonomy At true baseline, pt is independent w/ ADLs, most IADLs, and functional mobility w/o use of AD. (+) falls, (+) driving. Lives in a 1SH, ramped entrance.   Reciprocal Relationships supportive family and facility staff   Service to Others retired   Intrinsic Gratification reading and gardening   ADL   Eating Assistance 5  Supervision/Setup   Grooming Assistance 5  Supervision/Setup   Toileting Assistance  5  Supervision/Setup   Transfers   Sit to Stand 5  Supervision   Additional items Increased time required   Stand to Sit 5  Supervision   Additional items Verbal cues   Additional Comments use of rw   Functional Mobility   Functional Mobility 5  Supervision   Additional Comments use of rw to ambulate > household distances with no LOB.  O2 intermittently drops to 79%, however apppears to be poor reading   Subjective   Subjective \"It's hard on my heart\"- in regards to moving to [facility]   Cognition   Overall Cognitive Status Impaired   Arousal/Participation Alert;Cooperative   Attention Attends with cues to redirect   Orientation Level Oriented X4   Memory Decreased recall of recent events   Following Commands Follows one step commands without difficulty   Activity Tolerance   Activity Tolerance " Patient tolerated treatment well   Medical Staff Made Aware Spoke with PT Aspen   Assessment   Assessment Pt is seen for OT treatment session 10/4/24 including interventions to: increase independence with self care routine, improve functional transfers with fall prevention strategies and improve functional mobility during ADL routine.  Compared to previous session, pt demonstrating significant improvements in all areas.  Considering the progress she has made, recommend level III rehab resources upon d/c.  Pt is to continue to benefit from skilled occupational therapy services while in the hospital to maximize functioning and independence with daily activities, but at a decreased frequency of 2-3x/wk.  Recommend continued participation with self care and mobility with staff supervision/assist as needed to prevent deconditioning while hospitalized.   Plan   Treatment Interventions ADL retraining;Endurance training;UE strengthening/ROM;Functional transfer training;Equipment evaluation/education;Patient/family training;Cognitive reorientation;Compensatory technique education;Continued evaluation;Activityengagement   Goal Expiration Date 10/12/24   OT Treatment Day 1   OT Frequency (S)  2-3x/wk   Discharge Recommendation   Rehab Resource Intensity Level, OT III (Minimum Resource Intensity)   AM-PAC Daily Activity Inpatient   Lower Body Dressing 3   Bathing 3   Toileting 3   Upper Body Dressing 3   Grooming 3   Eating 3   Daily Activity Raw Score 18   Daily Activity Standardized Score (Calc for Raw Score >=11) 38.66   AM-PAC Applied Cognition Inpatient   Following a Speech/Presentation 3   Understanding Ordinary Conversation 4   Taking Medications 2   Remembering Where Things Are Placed or Put Away 2   Remembering List of 4-5 Errands 2   Taking Care of Complicated Tasks 2   Applied Cognition Raw Score 15   Applied Cognition Standardized Score 33.54       FLACA Lopez, OTR/L, CSRS  [place] License [id number]  [place]  License [id number]

## 2024-10-04 NOTE — PLAN OF CARE
Problem: OCCUPATIONAL THERAPY ADULT  Goal: Performs self-care activities at highest level of function for planned discharge setting.  See evaluation for individualized goals.  Description: Treatment Interventions: ADL retraining, Functional transfer training, UE strengthening/ROM, Endurance training, Patient/family training, Cognitive reorientation, Equipment evaluation/education, Compensatory technique education, Energy conservation, Activityengagement          See flowsheet documentation for full assessment, interventions and recommendations.   Outcome: Progressing  Note: Limitation: Decreased ADL status, Decreased UE strength, Decreased cognition, Decreased endurance, Decreased self-care trans, Decreased high-level ADLs  Prognosis: Fair  Assessment: Pt is seen for OT treatment session 10/4/24 including interventions to: increase independence with self care routine, improve functional transfers with fall prevention strategies and improve functional mobility during ADL routine.  Compared to previous session, pt demonstrating significant improvements in all areas.  Considering the progress she has made, recommend level III rehab resources upon d/c.  Pt is to continue to benefit from skilled occupational therapy services while in the hospital to maximize functioning and independence with daily activities, but at a decreased frequency of 2-3x/wk.  Recommend continued participation with self care and mobility with staff supervision/assist as needed to prevent deconditioning while hospitalized.     Rehab Resource Intensity Level, OT: III (Minimum Resource Intensity)

## 2024-10-05 ENCOUNTER — TELEPHONE (OUTPATIENT)
Dept: OTHER | Facility: OTHER | Age: 79
End: 2024-10-05

## 2024-10-05 DIAGNOSIS — Z51.5 PALLIATIVE CARE PATIENT: Primary | ICD-10-CM

## 2024-10-05 DIAGNOSIS — G47.01 INSOMNIA DUE TO MEDICAL CONDITION: ICD-10-CM

## 2024-10-05 RX ORDER — ZOLPIDEM TARTRATE 10 MG/1
10 TABLET ORAL
Qty: 3 TABLET | Refills: 0 | Status: SHIPPED | OUTPATIENT
Start: 2024-10-05 | End: 2024-10-08

## 2024-10-05 RX ORDER — OXYCODONE HYDROCHLORIDE 5 MG/1
2.5 TABLET ORAL EVERY 6 HOURS PRN
Qty: 6 TABLET | Refills: 0 | Status: SHIPPED | OUTPATIENT
Start: 2024-10-05 | End: 2024-10-08

## 2024-10-05 NOTE — CASE MANAGEMENT
Case Management Discharge Planning Note    Patient name Farnaz Martin  Location S /S -01 MRN [id number]  : 1945 Date 10/5/2024       Current Admission Date: 10/1/2024  Current Admission Diagnosis:Acute hypoxic respiratory failure (HCC)   Patient Active Problem List    Diagnosis Date Noted Date Diagnosed    Raynaud phenomenon 10/02/2024     Closed fracture of multiple ribs of left side with routine healing 10/02/2024     Right knee pain 10/02/2024     Left hand pain 10/02/2024     Atrial fibrillation (HCC) 10/01/2024     Diabetes mellitus type 2, insulin dependent ([facility]) 10/01/2024     Rib pain on left side 2024     Gait instability 2024     Sensorineural hearing loss (SNHL) of both ears 2024     Palliative care patient 2024     Shortness of breath 2024     Thyroid nodule 2024     Acute kidney injury superimposed on CKD (chronic kidney disease) stage 3, GFR 30-59 ml/min ([facility]) 2024     Fatigue 2024     Recurrent pleural effusion on right 2024     Syncope and collapse 2024     Fall 2024     Type 2 diabetes mellitus with microalbuminuria, without long-term current use of insulin ([facility]) 2024     Paroxysmal A-fib ([facility]) 2024     Ambulatory dysfunction 2024     Hyponatremia 2024     Bilateral lower extremity edema 2024     Left renal mass 2024     Acute cough 2024     Bronchiectasis without complication ([facility]) 2024     Multiple thyroid nodules 2024     Abnormal CT of the chest 2023     S/P revision of total knee, left 2023    Nonrheumatic mitral valve regurgitation 2023     Acute on chronic heart failure (HCC) 2022     Elevated serum creatinine 2022     Meningioma ([facility]) 2022     Chronic tension-type headache, not intractable 2022     Vasomotor rhinitis 2021     MGUS (monoclonal gammopathy of unknown significance) 2021      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     Tremors of nervous system 04/01/2021     Hx of diplopia 04/01/2021     S/P placement of cardiac pacemaker 03/24/2021     History of sick sinus syndrome s/p PPM 03/2021     Current use of long term anticoagulation 02/04/2021     Malignant neoplasm of thyroid gland (HCC) 01/11/2021     Acute hypoxic respiratory failure (HCC) 01/03/2021     Goals of care, counseling/discussion 12/09/2020     Chronic rhinitis 11/10/2020     Arthritis of both acromioclavicular joints 03/06/2020     Carpal tunnel syndrome on right 11/01/2019     Osteoarthritis of shoulder      TMJ syndrome 10/18/2017     Essential hypertension 04/19/2016     Peripheral neuropathy 03/07/2016     Lumbar spondylosis 06/29/2015     Lumbar stenosis 06/29/2015     Restless legs syndrome 07/09/2014     Allergic rhinitis 04/01/2013     Osteoarthritis of knee 04/01/2013     Insomnia 01/04/2013     Osteopenia 11/26/2012     Fibromyalgia 10/16/2012     Hyperlipidemia 10/16/2012     Generalized osteoarthritis of hand 10/16/2012     Vitamin D deficiency 10/16/2012       LOS (days): 4  Geometric Mean LOS (GMLOS) (days): 3.9  Days to GMLOS:0.2     OBJECTIVE:  Risk of Unplanned Readmission Score: 53.67         Current admission status: Inpatient   [facility] Pharmacy:   [id number] [address] [place] [address] ROAD  [address] [facility] [address]  Phone: [phone] Fax: [phone]    [facility] - [facility] [address] [place] [address]  [address] [facility]  [address]  [place] [id number]  Phone: [phone] Fax: [phone]    Primary Care Provider: Naveen Monsivais MD    Primary Insurance: MEDICARE  Secondary Insurance: [facility]    DISCHARGE DETAILS:    Transport at Discharge : Wheelchair van     Number/Name of Dispatcher: [employer]  Transported by (Company and Unit #): TBD  ETA of Transport (Date): 10/05/24  ETA of Transport (Time): 1600     Additional Comments: CM spoke with Brittni of [facility] and  informed pt is stable for discharge back today and has a 4pm transport. Brittni reports they are ready for her. Nursing aware of 4pm transport requested.     Pt's daughter aware and agreeable to pt returning to [facility] today.

## 2024-10-05 NOTE — PROGRESS NOTES
The patient was discharged back to [email or web address]] without scripts for oxycodone and zolpidem. E-script sent to [facility].

## 2024-10-06 NOTE — TELEPHONE ENCOUNTER
Chasity from [facility] called reporting pt became unresponsive after lunch and lost pulse. Pt was sent to the hospital    On call notified via [employer]

## 2024-10-10 NOTE — TELEPHONE ENCOUNTER
Pt's daughter wanted to make sure the doctor is aware that the pt passed. She said he was so kind to her.

## 2024-10-21 NOTE — ASSESSMENT & PLAN NOTE
"Left detailed message on pt identified voicemail. Pt instructed to call back to let care team know her thoughts on plan per PCP.    \"It is possible that it is a resistant infection. I recommend prolonged treatment with a follow up visit to look for it via nucleic acid amplified testing rather than wet prep. Please let patient know, see if she is OK with this plan.\"     RUSSEL Montaño.  " - Continue gabapentin 300mg daily

## 2025-05-23 NOTE — PROGRESS NOTES
Assessment/Plan:    No problem-specific Assessment & Plan notes found for this encounter  Diagnoses and all orders for this visit:    Postmenopausal    Vaginal atrophy    Screening mammogram for breast cancer  -     Mammo screening bilateral w 3d & cad; Future    Urinary incontinence, unspecified type  -     UA w Reflex to Microscopic w Reflex to Culture; Future          Subjective:      Patient ID: Halima Pryor is a 65 y o  female  Pt presents for her annual exam today--  She has no complaints except increase in urinary leakage this year  No burning  No odor  Denies any major change in meds, products, diet    She has no bleeding or pelvic pain  Bowel and bladder are regular  Colonoscopy--2021  No breast concerns today  Last mammo--2022  dexa--2022--penia    No pap today  rx mammo  rx ua c and s      The following portions of the patient's history were reviewed and updated as appropriate: allergies, current medications, past family history, past medical history, past social history, past surgical history and problem list     Review of Systems   Constitutional: Negative for chills, fever and unexpected weight change  Gastrointestinal: Negative for abdominal pain, blood in stool, constipation and diarrhea  Genitourinary: Negative  Objective:      /80   Ht 5' 1 42" (1 56 m)   Wt 71 2 kg (157 lb)   LMP 02/01/1990 (Within Weeks)   BMI 29 26 kg/m²          Physical Exam  Vitals and nursing note reviewed  Constitutional:       Appearance: She is well-developed  HENT:      Head: Normocephalic and atraumatic  Chest:   Breasts:      Right: No inverted nipple, mass, nipple discharge or skin change  Left: No inverted nipple, mass, nipple discharge or skin change  Abdominal:      Palpations: Abdomen is soft  Genitourinary:     Exam position: Supine  Labia:         Right: No rash, tenderness or lesion  Left: No rash, tenderness or lesion         Vagina: Normal       Cervix: No cervical motion tenderness, discharge or friability  Adnexa:         Right: No mass, tenderness or fullness  Left: No mass, tenderness or fullness  Musculoskeletal:      Cervical back: Normal range of motion  Lymphadenopathy:      Lower Body: No right inguinal adenopathy  No left inguinal adenopathy  [phone] Stable

## 2025-07-19 RX ORDER — ROPINIROLE 0.25 MG/1
0.25 TABLET, FILM COATED ORAL
Qty: 30 TABLET | Refills: 2 | OUTPATIENT
Start: 2025-07-19

## (undated) DEVICE — SPONGE PVP SCRUB WING STERILE

## (undated) DEVICE — PENCIL ELECTROSURG E-Z CLEAN -0035H

## (undated) DEVICE — SPONGE CHERRY 1/2IN

## (undated) DEVICE — NEEDLE 25G X 1 1/2

## (undated) DEVICE — INTENDED FOR TISSUE SEPARATION, AND OTHER PROCEDURES THAT REQUIRE A SHARP SURGICAL BLADE TO PUNCTURE OR CUT.: Brand: BARD-PARKER SAFETY BLADES SIZE 10, STERILE

## (undated) DEVICE — VIAL DECANTER

## (undated) DEVICE — SUT SILK 3-0 18 IN A184H

## (undated) DEVICE — PADDING CAST 4 IN  COTTON STRL

## (undated) DEVICE — LIGHT HANDLE COVER SLEEVE DISP BLUE STELLAR

## (undated) DEVICE — TIBURON SPLIT SHEET: Brand: CONVERTORS

## (undated) DEVICE — SYRINGE 10ML LL

## (undated) DEVICE — STERILE BETHLEHEM PLASTIC HAND: Brand: CARDINAL HEALTH

## (undated) DEVICE — BULB SYRINGE,IRRIGATION WITH PROTECTIVE CAP: Brand: DOVER

## (undated) DEVICE — ELECTRODE BLADE MOD E-Z CLEAN  2.75IN 7CM -0012AM

## (undated) DEVICE — PAD GROUNDING ADULT

## (undated) DEVICE — SUT ETHILON 2-0 FSLX 30 IN 1674H

## (undated) DEVICE — GLOVE SRG BIOGEL 8

## (undated) DEVICE — ACE WRAP 3 IN STERILE

## (undated) DEVICE — SUT SILK 3-0 SH 30 IN K832H

## (undated) DEVICE — INTENDED FOR TISSUE SEPARATION, AND OTHER PROCEDURES THAT REQUIRE A SHARP SURGICAL BLADE TO PUNCTURE OR CUT.: Brand: BARD-PARKER SAFETY BLADES SIZE 15, STERILE

## (undated) DEVICE — PACK UNIVERSAL NECK

## (undated) DEVICE — GAUZE SPONGES,16 PLY: Brand: CURITY

## (undated) DEVICE — CHLORAPREP HI-LITE 26ML ORANGE

## (undated) DEVICE — TUBING SUCTION 5MM X 12 FT

## (undated) DEVICE — BIPOLAR CORD DISP

## (undated) DEVICE — SUT MONOCRYL 4-0 PS-2 18 IN Y496G

## (undated) DEVICE — GLOVE INDICATOR PI UNDERGLOVE SZ 8.5 BLUE

## (undated) DEVICE — 3M™ STERI-STRIP™ REINFORCED ADHESIVE SKIN CLOSURES, R1547, 1/2 IN X 4 IN (12 MM X 100 MM), 6 STRIPS/ENVELOPE: Brand: 3M™ STERI-STRIP™

## (undated) DEVICE — GLOVE SRG BIOGEL ORTHOPEDIC 7

## (undated) DEVICE — U-DRAPE: Brand: CONVERTORS

## (undated) DEVICE — SUT VICRYL 3-0 SH 27 IN J416H

## (undated) DEVICE — OCCLUSIVE GAUZE STRIP,3% BISMUTH TRIBROMOPHENATE IN PETROLATUM BLEND: Brand: XEROFORM